# Patient Record
Sex: FEMALE | Race: WHITE | NOT HISPANIC OR LATINO | URBAN - METROPOLITAN AREA
[De-identification: names, ages, dates, MRNs, and addresses within clinical notes are randomized per-mention and may not be internally consistent; named-entity substitution may affect disease eponyms.]

---

## 2016-11-23 NOTE — PATIENT PROFILE ADULT. - PMH
Depression    Diverticulitis    DVT (deep venous thrombosis)  2013  Hypertension    Obesity    Reflux

## 2016-11-23 NOTE — PATIENT PROFILE ADULT. - PSH
Gastric bypass status for obesity  gastric sleeve, 6/2012  S/P appendectomy  1975  S/P breast biopsy  2011, left  S/P colon resection  2011  S/P knee surgery  repair 2009, 2011  S/P tonsillectomy  1967  Umbilical hernia  2000

## 2016-11-28 NOTE — PROGRESS NOTE ADULT - SUBJECTIVE AND OBJECTIVE BOX
POST-OPERATIVE NOTE    Procedure: revision Harmony-en-Y    Diagnosis/Indication: s/p LSG (2002) with persistent leak    Surgeon: Dr. Brady    S: Denies CP, SOB, GARCIA, calf tenderness. Pain controlled with medication. Endorses nausea, though denies vomiting.    O:  T(C): 36.6, Max: 36.6 (11-28 @ 13:40)  T(F): 97.9, Max: 97.9 (11-28 @ 13:40)  HR: 67 (67 - 70)  BP: 155/88 (141/64 - 156/84)  RR: 16 (16 - 16)  SpO2: 98% (98% - 100%)  Wt(kg): --                        9.3    14.2  )-----------( 211      ( 28 Nov 2016 14:36 )             30.2     28 Nov 2016 14:36    142    |  109    |  21     ----------------------------<  108    4.1     |  24     |  0.74     Ca    8.1        28 Nov 2016 14:36        Gen: NAD, resting comfortably in bed  C/V: NSR, HR in 60s  Pulm: Nonlabored breathing, no respiratory distress  Abd: soft, ND, obese. Appropriately tender in LLQ. Laparoscopic incisions CDI with dermabond. CHECO drain R abdomen with serosanguinous output.  Extrem: WWP, no calf edema or tenderness, SCDs in place  Yu in place, very light, clear urine

## 2016-11-28 NOTE — BRIEF OPERATIVE NOTE - OPERATION/FINDINGS
Lysis of Adhesions. RNY Gastric Bypass. Resection remanent stomach. EGD.    Chronic inflammation surrounding sleeve and including the site of previous leak.   Dense adhesions along left lobe of liver and lesser curvature of stomach.    Hand sewn gastrojejunal anastomosis.  Submersion leak test of anastomosis and direct visualization with endoscopy.

## 2016-11-28 NOTE — PROGRESS NOTE ADULT - ASSESSMENT
A/P: 56y Female s/p above procedure    Pain/nausea control  A/C: hold for now  NPO  Protonix 40mg IV QD  IVF LR@150cc/hr  CBC post-op stable  Dietician consult  AM labs  OOBA/IS/SCDs

## 2016-11-28 NOTE — H&P PST ADULT - HISTORY OF PRESENT ILLNESS
56  F with sleeve gastrectomy 2012 complicated by stricture and leak. 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 Pt with persistent vomiting. Pt presents for gastrectomy.

## 2016-11-29 NOTE — DIETITIAN INITIAL EVALUATION ADULT. - ENERGY NEEDS
IBW used as pt is currently greater than 120% ideal body weight.  Needs adjusted s/p bariatric surgery.  20-25cal/kg IBW for maintenance; post-op 545-654kcal (10-12cal/kg IBW), .5g pro (1.5-2.1g pro/kg IBW), >/= 1920cc fluid.

## 2016-11-29 NOTE — PROGRESS NOTE ADULT - SUBJECTIVE AND OBJECTIVE BOX
55 yo female, pmh lap vsg 2002 complicated by persistent leaks, htn, partial colectomy POD 1 s/p aamir en y reconstruction with no complaints today.  Pt denies chest pain, sob, abdominal pain, leg pain, fever, chills, n/v, lightheadedness, dizziness.                           9.7    15.1  )-----------( 236      ( 29 Nov 2016 07:34 )             31.7       29 Nov 2016 07:34    143    |  106    |  13     ----------------------------<  93     4.1     |  27     |  0.73     Ca    8.5        29 Nov 2016 07:34  Phos  3.0       29 Nov 2016 07:34  Mg     1.9       29 Nov 2016 07:34    TPro  5.7    /  Alb  2.6    /  TBili  0.6    /  DBili  0.15   /  AST  43     /  ALT  39     /  AlkPhos  66     29 Nov 2016 07:34      T(C): 37.9, Max: 37.9 (11-29 @ 09:15)  HR: 82 (67 - 87)  BP: 142/77 (105/62 - 168/83)  RR: 14 (14 - 16)  SpO2: 92% (92% - 100%)  Wt(kg): --      gen: A&0x3, NAD, pleasant,   Heart: s1,s2 RRR,   Lung: CTA B/L  Abd: n/d, n/t, soft, incisions c/d/i, mild erythema surrounding incision above umbilicus, about 1cm x 1 cm, no drainage  LE: no edema, b/l LE, no gerard b/l LE  skin: MMM    55 yo female pod 1 s/p aamir en y reconstruction, vss, no complaints, oob,     Plan:  ugi  incentive spirometry   oob, scds, asa later today   flagyl   ppi  nausea control prn  pain control prn  4 oz an hour every hour for 4 hours straight if ugi wnl

## 2016-11-29 NOTE — PROGRESS NOTE ADULT - PROBLEM SELECTOR PLAN 1
NPO/IVF  pain/nausea control  SCDs  bertha KESSLER x1  f/u AM labs  UGI in AM NPO/IVF  Flagyl  pain/nausea control  SCDs  bertha KESSLER x1  f/u AM labs  UGI in AM NPO/IVF  Flagyl  pain/nausea control  SCDs  bertha KESSLER x1  f/u AM labs  OOBA/IS

## 2016-11-29 NOTE — DIETITIAN INITIAL EVALUATION ADULT. - NS AS NUTRI INTERV MEALS SNACK
As medically feasible, recommend continued diet advancement to Phase 1 Bariatric Full Liquids diet + Ensure High Protein TID (480kcal, 48g pro)

## 2016-11-29 NOTE — PROGRESS NOTE ADULT - SUBJECTIVE AND OBJECTIVE BOX
Overnight Events: O/N: CHECO output slowed, still sang in nature, no tachycardia, and good UO, pain controlled, minimal nausea, BRAYDEN, AVSS  11/28: OR for Harmony-en-Y reconstruction, resection of gastric remnant, handsewn GJ anatomosis. Floseal placed on raw surface of liver, 12Fr Jose drain left under left lobe of liver. 2U PRBC given. Preop Hb 9, postop H/H 9.3/30.2. POC WNL; pt tender in LLQ and nauseous. 155mL serosanguinous (mostly sanguinous) output from CHECO at POC. Overnight Events: O/N: CHECO output slowed, still sang in nature, no tachycardia, and good UO, pain controlled, minimal nausea, BRAYDEN, AVSS  11/28: OR for Harmony-en-Y reconstruction, resection of gastric remnant, handsewn GJ anatomosis. Floseal placed on raw surface of liver, 12Fr Jose drain left under left lobe of liver. 2U PRBC given. Preop Hb 9, postop H/H 9.3/30.2. POC WNL; pt tender in LLQ and nauseous. 155mL serosanguinous (mostly sanguinous) output from CHECO at POC.    STATUS POST:  gastric bypass reconstruction POD1 after LSG (2002)     SUBJECTIVE: Pain well controlled with medication. No nausea or vomiting. Has been ambulating without difficulty. No SOB, CP.    metroNIDAZOLE  IVPB 500milliGRAM(s) IV Intermittent every 8 hours      Vital Signs Last 24 Hrs  T(C): 36.5, Max: 37.6 (11-29 @ 01:06)  T(F): 97.7, Max: 99.7 (11-29 @ 01:06)  HR: 87 (67 - 87)  BP: 129/70 (105/62 - 168/83)  BP(mean): 90 (83 - 90)  RR: 16 (15 - 16)  SpO2: 95% (95% - 100%)  I&O's Detail    I & Os for current day (as of 29 Nov 2016 07:11)  =============================================  IN:    lactated ringers.: 900 ml    Total IN: 900 ml  ---------------------------------------------  OUT:    Voided: 650 ml    Indwelling Catheter - Urethral: 535 ml    Bulb: 250 ml    Total OUT: 1435 ml  ---------------------------------------------  Total NET: -535 ml      General: NAD, resting comfortably in bed  C/V: NSR  Pulm: Nonlabored breathing, no respiratory distress  Abd: soft, NT/ND, obese. CHECO in RLQ with sanguinous output, slowing down since OR. Some leakage around CHECO. Other incisions CDI.  Extrem: WWP, no edema, SCDs in place        LABS:                        9.3    14.2  )-----------( 211      ( 28 Nov 2016 14:36 )             30.2     28 Nov 2016 14:36    142    |  109    |  21     ----------------------------<  108    4.1     |  24     |  0.74     Ca    8.1        28 Nov 2016 14:36            RADIOLOGY & ADDITIONAL STUDIES:

## 2016-11-29 NOTE — PROGRESS NOTE ADULT - ASSESSMENT
55 yo F with morbid obesity 56yoF with PMH HTN, diverticulitis s/p partial colectomy, LSG (2002), now s/p gastric bypass reconstruction POD1

## 2016-11-29 NOTE — DIETITIAN INITIAL EVALUATION ADULT. - OTHER INFO
Pt previously with sleeve gastrectomy complicated by stricture and leak with chronic fistula; pt presents for RYGB.  Pt with previous weight loss ~117lbs s/p LSG with weight stabilization over the past couple years.  Of note, pt with low iron stores and Vit D-OH level; recommend repeat levels and possible repletion.  Pt endorses good tolerance of clears thus far.  Pt denies GI distress; pain is being managed.  NKFA.  Skin: surgical incision; otherwise intact .

## 2016-11-30 NOTE — DISCHARGE NOTE ADULT - CONDITIONS AT DISCHARGE
stable, J-Tube intact and patent, midline incision wound VAC removed and changed to wet to dry dressing as ordered for transportation to [facility]

## 2016-11-30 NOTE — PROGRESS NOTE ADULT - SUBJECTIVE AND OBJECTIVE BOX
Overnight Events: O/N: drowsy, held narcotics, started IV tylenol, passed TOV, O2 improved in chair to 96%  11/29:UGI Contained focal out pouching of the oral contrast and the gastrojejunal anastomosis suggesting possibility of a contained anastomotic leak.Pain well controlled , OOB/AMB , tolerating sips OVERNIGHT EVENTS: drowsy, held narcotics, started IV tylenol, passed TOV, O2 improved in chair to 96%  11/29:UGI Contained focal out pouching of the oral contrast and the gastrojejunal anastomosis suggesting possibility of a contained anastomotic leak.Pain well controlled , OOB/AMB , tolerating sips       STATUS POST:  Harmony-en- Y reconstruction     POST OPERATIVE DAY #:  2    SUBJECTIVE: Patient complains of incisional pain  Flatus: [] YES [X] NO             Bowel Movement: [ ] YES [ X] NO  Pain (0-10):            Pain Control Adequate: [X YES [ ] NO  Nausea: [ ] YES [X ] NO            Vomiting: [ ] YES [X ] NO  Diarrhea: [ ] YES [X ] NO         Constipation: [ ] YES [X ] NO     Chest Pain: [ ] YES [X ] NO    SOB:  [ ] YES [X ] NO    metroNIDAZOLE  IVPB 500milliGRAM(s) IV Intermittent every 8 hours  heparin  Injectable 5000Unit(s) SubCutaneous every 8 hours  cefOXitin  IVPB 1Gram(s) IV Intermittent every 8 hours      Vital Signs Last 24 Hrs  T(C): 37.1, Max: 38.3 (11-29 @ 17:23)  T(F): 98.8, Max: 101 (11-29 @ 17:23)  HR: 80 (80 - 87)  BP: 109/64 (109/64 - 142/77)  BP(mean): --  RR: 15 (14 - 16)  SpO2: 94% (92% - 97%)  I&O's Detail    I & Os for current day (as of 30 Nov 2016 07:17)  =============================================  IN:    lactated ringers.: 1650 ml    Total IN: 1650 ml  ---------------------------------------------  OUT:    Voided: 550 ml    Indwelling Catheter - Urethral: 300 ml    Bulb: 125 ml    Total OUT: 975 ml  ---------------------------------------------  Total NET: 675 ml      General: NAD, resting comfortably in bed  C/V: NSR  Pulm: Nonlabored breathing, no respiratory distress  Abd: soft, non distended , TTP around incision site , incision clean dry and intact   Extrem: WWP, no edema, SCDs in place  Drains: serosanguineous          LABS:                        9.7    15.1  )-----------( 236      ( 29 Nov 2016 07:34 )             31.7     29 Nov 2016 07:34    143    |  106    |  13     ----------------------------<  93     4.1     |  27     |  0.73     Ca    8.5        29 Nov 2016 07:34  Phos  3.0       29 Nov 2016 07:34  Mg     1.9       29 Nov 2016 07:34    TPro  5.7    /  Alb  2.6    /  TBili  0.6    /  DBili  0.15   /  AST  43     /  ALT  39     /  AlkPhos  66     29 Nov 2016 07:34          RADIOLOGY & ADDITIONAL STUDIES:

## 2016-11-30 NOTE — DISCHARGE NOTE ADULT - MEDICATION SUMMARY - MEDICATIONS TO CHANGE
I will SWITCH the dose or number of times a day I take the medications listed below when I get home from the hospital:  None

## 2016-11-30 NOTE — DISCHARGE NOTE ADULT - PATIENT PORTAL LINK FT
“You can access the [employer] Patient Portal, offered by [facility], by registering with the following website: [email or web address]”

## 2016-11-30 NOTE — PROGRESS NOTE ADULT - SUBJECTIVE AND OBJECTIVE BOX
s/p major revision and looks very good  has incisional pain no peritoneal signs   drain is clear and no tachycardia  hct stable   urine output is fine  wbc 14 k  I reviewed ugi and the patient had an asynchronous pouch from the chronic issues  I do not see any leak and this would not be contained at this early as no omental seal  tolerating diet   will leave drain in place and follow for at least 24 hours

## 2016-11-30 NOTE — DISCHARGE NOTE ADULT - NS AS ACTIVITY OBS
Stairs allowed/Walking-Outdoors allowed/No Heavy lifting/straining/Driving allowed/Showering allowed/Walking-Indoors allowed

## 2016-11-30 NOTE — DISCHARGE NOTE ADULT - NS MD DC FALL RISK RISK
For information on Fall & Injury Prevention, visit [email or web address]

## 2016-11-30 NOTE — DISCHARGE NOTE ADULT - HOSPITAL COURSE
56yoF with PMH HTN, diverticulitis s/p partial colectomy, LSG (2002) complicated by chronic fistula and corkscrewed sleeve presented for elective bariatric surgery. Pt underwent a RYGB reconstruction. Procedure was complicated by difficult hemostasis; CHECO drain was thus placed. Post-op course was uneventful. UGI on POD#1 initially read as possible contained anastomotic leak, though in discussion with attending, UGI negative for leak or obstruction and diet was advanced to bariatric clear liquids. At time of discharge the patient was tolerating an adequate amount of PO intake and was stable and ready for discharge with follow-up as outpatient. 56yoF with PMH HTN, diverticulitis s/p partial colectomy, LSG (2002) complicated by chronic fistula and corkscrewed sleeve presented for elective bariatric surgery. Pt underwent a RYGB reconstruction. Procedure was complicated by difficult hemostasis; CHECO drain was thus placed. 56yoF with PMH HTN, diverticulitis s/p partial colectomy, LSG (2002) complicated by chronic fistula and corkscrewed sleeve presented for elective bariatric surgery. Pt underwent a RYGB reconstruction. Procedure was complicated by hypotension and hypoxia postoperatively. Patient was transferred to the SICU, intubated and required pressors for blood pressure maintenance. While under SICU care, patient's condition continued to deteriorate. On 12/3/16, patient was taken back to the OR for a exploratory laparotomy, abdominal washout, J tube placement, placement of abdominal drains, and delayed closure with wound vac. Patient had an anastomotic leak at the EJ junction for which an esophagojejunostomy stent was placed endoscopically by GI. Patient's course in the ICU was complicated by MDR pseudomonas infection in the wound as well as an MDR pseudomonal pneumonia. While on multiple abx therapy, patient developed C.diff infection which was treated with PO Vancomycin. Patient underwent a tracheostomy placement and was weaned from the went during her prolonged course. Patient's respiratory status improved with Meropenem and Colistin and C.diff infx improved with PO vancomycin. Patient was treated with a 1 month course of IV meropenem/Colistin and concurrent Vancomycin treatment. Patient's condition improved with treatment. On 1/18/17, surveillance EGD was wnl and the EJ stent was removed. On 1/19/17, patient was decannulated and stepped down to a regional bed. Patient's diet was advanced per speech and swallow recommendations. On 1/22 patient was advanced to a mechanical soft diet. Patient's wound was healing well with granulation tissue and no evidence of infection. On 1/30/17, patient completed the recommended course of IV abx per Infectous disease. The patient was stable for discharge to [facility] at this time. At the time of discharge, patient was stable.

## 2016-11-30 NOTE — DISCHARGE NOTE ADULT - PLAN OF CARE
Recovery, weight loss Follow up with Dr. Brady in 1 week. Call the office at  to schedule your appointment. You may shower; soap and water over incision sites. Do not scrub. Pat dry when done. No tub bathing or swimming until cleared. Keep incision sites out of the sun as scars will darken. No heavy lifting (>10lbs) or strenuous exercise. Diet: Bariatric Full Fluids. 60 grams protein daily.  64 fluid ounces water daily. Drink small sips throughout the day. Continue diet as outlined by paperwork received as a pre-operative patient. You should be urinating at least 3-4x per day. Call the office if you experience increasing abdominal pain, nausea, vomiting, or temperature >100.4F. No NSAIDs until approved by Dr. Brady. Avoid alcoholic beverages until cleared by Dr. Brady. Please also follow up with your primary care provider to discuss management of your other health issues after surgery, such as management of glucose and blood pressure, as the dosages of any current medications may change as you lose weight. Wound Care Wound care instructions:   Wound VAC to be changed MWF. Apply white granufoam dressing first. Then apply small black granufoam dressing on top of white foam.   Replace wound vac dressing MWF.    IF WOUND VAC stops working, replace with wet to dry dressing.   Pack wound with 4x4 gauze soaked in sterile saline. Cover with 4x4 dry gauze. Cover with abdominal pad. Tape in place with paper tape. Replace daily.

## 2016-11-30 NOTE — DISCHARGE NOTE ADULT - MEDICATION SUMMARY - MEDICATIONS TO TAKE
I will START or STAY ON the medications listed below when I get home from the hospital:    acetaminophen 325 mg oral tablet  -- 1 tab(s) by mouth every 4 hours, As needed, Moderate Pain (4 - 6)  -- Indication: For Pain    acetaminophen-oxyCODONE 325 mg-5 mg oral tablet  -- 1 tab(s) by mouth every 4 hours, As needed, Severe Pain (7 - 10)  -- Indication: For Pain    losartan 100 mg oral tablet  -- 1 tab(s) by mouth once a day  -- Indication: For HTN    enoxaparin  -- 80 unit(s) subcutaneous once a day  -- Indication: For DVT (deep venous thrombosis)    Valium 10 mg oral tablet  -- 1 tab(s) by mouth once a day (at bedtime)  -- Indication: For Anxiety    Lexapro 20 mg oral tablet  -- 1 tab(s) by mouth once a day  -- Indication: For Anxiety    Zofran ODT 4 mg oral tablet, disintegrating  -- 1 tab(s) by mouth 3 times a day, As Needed -for nausea  -- Indication: For Nausea    diphenhydrAMINE 25 mg oral capsule  -- 1 cap(s) by mouth once a day (at bedtime), As needed, Insomnia  -- Indication: For Itching    albuterol-ipratropium 2.5 mg-0.5 mg/3 mL inhalation solution  -- 3 milliliter(s) inhaled every 6 hours  -- Indication: For Recent PNA    meropenem 1000 mg intravenous injection  -- 2000 milligram(s) intravenous every 8 hours  -- Indication: For MDRO infection     nystatin 100,000 units/g topical powder  -- 1 application on skin once a day  -- Indication: For Itching    hydrocortisone 1% topical cream  -- 1 application on skin 2 times a day  -- Indication: For Itching     collagenase 250 units/g topical ointment  -- 1 application on skin once a day  -- Indication: For Wound care    vancomycin 250 mg/5 mL oral solution  -- 2.5 milliliter(s) by mouth every 6 hours  -- Indication: For Cdiff infection     ferrous sulfate 300 mg/5 mL (60 mg elemental iron) oral liquid  -- 5 milliliter(s) by mouth once a day  -- Indication: For Anemia    zinc sulfate 220 mg oral capsule  -- 1 cap(s) by mouth once a day  -- Indication: For Supplement    Coly Mycin M 150 mg (colistin base) injection  -- 150 milligram(s) injectable once a day  -- Indication: For MDRO infection     Protonix 40 mg oral granule, enteric coated  -- 1 each by mouth once a day  -- Indication: For Acid Reflux    VESIcare 5 mg oral tablet  -- 1 tab(s) by mouth once a day  -- Indication: For Antispasmodic    Multiple Vitamins oral tablet  -- 1 tab(s) by mouth once a day  -- Indication: For Supplement    Vitamin B-12 250 mcg oral tablet  -- 1 tab(s) by mouth once a day  -- Indication: For Supplement    cholecalciferol oral tablet  -- 2000 unit(s) by mouth once a day  -- Indication: For Supplement    thiamine 100 mg oral tablet  -- 1 tab(s) by mouth once a day  -- Indication: For Supplement

## 2016-12-01 NOTE — PROGRESS NOTE ADULT - SUBJECTIVE AND OBJECTIVE BOX
OVERNIGHT EVENTS: No acute events overnight   11/30: Minimal ambulation with assistance. Pt with persistent pain.    STATUS POST: Harmony -en-Y reconstruction     POST OPERATIVE DAY #: 3    SUBJECTIVE: Patient reports pain is uncontrolled  and reports tolerating BCLD.   Flatus: [] YES [X] NO             Bowel Movement: [ ] YES [X ] NO  Pain (0-10):  6/10          Pain Control Adequate: [ ] YES [X ] NO  Nausea: [ ] YES [X ] NO            Vomiting: [ ] YES [X ] NO  Diarrhea: [ ] YES [X ] NO         Constipation: [ ] YES [X ] NO     Chest Pain: [ ] YES [X ] NO    SOB:  [ ] YES [X ] NO    metroNIDAZOLE  IVPB 500milliGRAM(s) IV Intermittent every 8 hours  heparin  Injectable 5000Unit(s) SubCutaneous every 8 hours      Vital Signs Last 24 Hrs  T(C): 36.4, Max: 37.2 (11-30 @ 18:24)  T(F): 97.6, Max: 99 (11-30 @ 18:24)  HR: 88 (88 - 94)  BP: 113/75 (101/59 - 114/65)  BP(mean): 90 (90 - 90)  RR: 16 (16 - 16)  SpO2: 95% (90% - 96%)  I&O's Detail    I & Os for current day (as of 01 Dec 2016 09:03)  =============================================  IN:    lactated ringers.: 3450 ml    Oral Fluid: 400 ml    IV PiggyBack: 150 ml    Total IN: 4000 ml  ---------------------------------------------  OUT:    Voided: 1250 ml    Bulb: 75 ml    Total OUT: 1325 ml  ---------------------------------------------  Total NET: 2675 ml      General: NAD, resting comfortably in bed  C/V: NSR  Pulm: Nonlabored breathing, no respiratory distress  Abd: soft, non distended , incision clean dry and intact , TTP around incision site.  Extrem: WWP, no edema, SCDs in place  Drains: CHECO serosanguineous       LABS:                        10.4   15.9  )-----------( 312      ( 01 Dec 2016 02:38 )             33.3     30 Nov 2016 07:10    139    |  105    |  17     ----------------------------<  111    3.7     |  28     |  0.78     Ca    8.2        30 Nov 2016 07:10  Phos  2.5       30 Nov 2016 07:10  Mg     2.1       30 Nov 2016 07:10    TPro  5.3    /  Alb  2.2    /  TBili  0.7    /  DBili  x      /  AST  22     /  ALT  30     /  AlkPhos  63     30 Nov 2016 07:10          RADIOLOGY & ADDITIONAL STUDIES:

## 2016-12-01 NOTE — PHYSICAL THERAPY INITIAL EVALUATION ADULT - CRITERIA FOR SKILLED THERAPEUTIC INTERVENTIONS
risk reduction/prevention/rehab potential/anticipated discharge recommendation/impairments found/therapy frequency/functional limitations in following categories

## 2016-12-01 NOTE — PROGRESS NOTE ADULT - PROBLEM SELECTOR PLAN 1
BCLD/IVF  pain/nausea control  DVT ppx  f/u AM labs  f/u Physical Therapy rec  Toradol x1 dose  Valium x 1

## 2016-12-01 NOTE — PHYSICAL THERAPY INITIAL EVALUATION ADULT - PERTINENT HX OF CURRENT PROBLEM, REHAB EVAL
Pt is a 55 y/o F PMHx of gastric bypass surgery presenting with persistent vomiting, gastrectomy performed to repair leak in gastric sleeve.

## 2016-12-01 NOTE — PROGRESS NOTE ADULT - SUBJECTIVE AND OBJECTIVE BOX
57 yo female pod 3 s/p aamir en y reconstruction with abdominal pain.  Pt states its constant, not worsening.  She states she "noticed it more when they switched meds to percocet."  It is in her lower abdomen and periumbilical.  Hurts worse with movement.  Dull pain.  Denies fever, chills, n/v, chest pain, sob, lightheadedness, dizziness, leg pain.                          10.4   15.9  )-----------( 312      ( 01 Dec 2016 02:38 )             33.3     30 Nov 2016 07:10    139    |  105    |  17     ----------------------------<  111    3.7     |  28     |  0.78     Ca    8.2        30 Nov 2016 07:10  Phos  2.5       30 Nov 2016 07:10  Mg     2.1       30 Nov 2016 07:10    TPro  5.3    /  Alb  2.2    /  TBili  0.7    /  DBili  x      /  AST  22     /  ALT  30     /  AlkPhos  63     30 Nov 2016 07:10    T(C): 37.1, Max: 37.2 (11-30 @ 18:24)  HR: 84 (84 - 94)  BP: 111/65 (101/59 - 114/65)  RR: 16 (16 - 16)  SpO2: 94% (90% - 96%)  Wt(kg): --    gen: A&0x3, NAD  Heart: s1,s2 RRR,   Lung: CTA B/L  Abd: n/d, soft, incisions c/d/i, mild tenderness to palpation periumbilical   LE: no edema, b/l LE, no gerard b/l LE  skin: MMM    57 yo female pod 3 with abdominal discomfort relieved with dilaudid, vss, anemia,         Plan:  monitor vitals and cbc qd, serial abdominal exams  oob, scds,  ppi  nausea control prn  pain control prn- can use dilaudid per dr tucker   4 oz an hour every hour for 4 hours straight  incentive spirometry

## 2016-12-01 NOTE — PHYSICAL THERAPY INITIAL EVALUATION ADULT - ADDITIONAL COMMENTS
Pt lives with spouse in a house with a few steps to enter and 13 steps to the 2nd floor (with handrail). Pt has raised toilet seats due to knee issues.

## 2016-12-01 NOTE — PHYSICAL THERAPY INITIAL EVALUATION ADULT - GENERAL OBSERVATIONS, REHAB EVAL
Pt received semi supine in bed, appearing lethargic but arousable, + EKG, IV, abdominal drain with incision C/D/I, SCDs.

## 2016-12-02 NOTE — PROGRESS NOTE ADULT - SUBJECTIVE AND OBJECTIVE BOX
s/p repair of chronic leak  afeb heart rate in 80's  abd soft tolerting liquids nothing out drain  wbc is 7.9  requires being straight cath and has had increase in bun cr  will stop iv antibioics  esyes closed but oriented no focal signs  hold narcotics  ambulate  hopefully void on own   urine output fine  hct is 30  needs to ambulate

## 2016-12-02 NOTE — PROVIDER CONTACT NOTE (CHANGE IN STATUS NOTIFICATION) - SITUATION
pt became rapidly tachycardic to 120's, hypoxic to 70's, hypotensive to 70's systolic. pt became flushed from chest up. pt maintained alertness. medicated per dr varner's orders

## 2016-12-02 NOTE — PROGRESS NOTE ADULT - SUBJECTIVE AND OBJECTIVE BOX
INTERVAL HPI/OVERNIGHT EVENTS:    STATUS POST:  aamir-en-y    POST OPERATIVE DAY #: 4    SUBJECTIVE:  Flatus: [x] YES [ ] NO             Bowel Movement: [ ] YES [x] NO  Pain (0-10):            Pain Control Adequate: [x] YES [ ] NO  Nausea: [ ] YES [x] NO            Vomiting: [ ] YES [x] NO  Diarrhea: [ ] YES [x] NO         Constipation: [ ] YES [x] NO     Chest Pain: [ ] YES [x] NO    SOB:  [ ] YES [x] NO    MEDICATIONS  (STANDING):  lactated ringers. 1000milliLiter(s) IV Continuous <Continuous>  pantoprazole  Injectable 40milliGRAM(s) IV Push daily  heparin  Injectable 5000Unit(s) SubCutaneous every 8 hours  docusate sodium 100milliGRAM(s) Oral two times a day  calcium gluconate IVPB 1Gram(s) IV Intermittent once  levoFLOXacin  Tablet 500milliGRAM(s) Oral every 24 hours    MEDICATIONS  (PRN):  ondansetron Injectable 8milliGRAM(s) IV Push every 6 hours PRN Nausea and/or Vomiting  metoclopramide Injectable 10milliGRAM(s) IV Push every 6 hours PRN nausea      Vital Signs Last 24 Hrs  T(C): 36.1, Max: 37.2 ( @ 17:00)  T(F): 97, Max: 98.9 ( @ 17:00)  HR: 80 (74 - 87)  BP: 144/62 (88/54 - 144/62)  BP(mean): --  RR: 12 (12 - 16)  SpO2: 100% (92% - 100%)    PHYSICAL EXAM:      Constitutional: A&Ox3    Respiratory: non labored breathing, no respiratory distress    Cardiovascular: NSR, RRR    Gastrointestinal: S/NT/ND, CHECO s/s                 Incision: C/D/I    Extremities: (-) edema    Neuro: A&Ox3, no focal motor deficits, CN II-XI intact                  I&O's Detail  I & Os for 24h ending 02 Dec 2016 07:00  =============================================  IN:    lactated ringers.: 3000 ml    IV PiggyBack: 500 ml    Oral Fluid: 150 ml    Total IN: 3650 ml  ---------------------------------------------  OUT:    Intermittent Catheterization - Urethral: 550 ml    Voided: 550 ml    Bulb: 50 ml    Total OUT: 1150 ml  ---------------------------------------------  Total NET: 2500 ml    I & Os for current day (as of 02 Dec 2016 09:53)  =============================================  IN:    Total IN: 0 ml  ---------------------------------------------  OUT:    Intermittent Catheterization - Urethral: 325 ml    Total OUT: 325 ml  ---------------------------------------------  Total NET: -325 ml      LABS:                        9.2    7.9   )-----------( 228      ( 02 Dec 2016 07:13 )             29.6     02 Dec 2016 07:13    139    |  106    |  33     ----------------------------<  75     4.1     |  24     |  1.22     Ca    8.1        02 Dec 2016 07:13  Phos  3.5       02 Dec 2016 07:13  Mg     2.2       02 Dec 2016 07:13    TPro  5.0    /  Alb  1.8    /  TBili  0.6    /  DBili  x      /  AST  11     /  ALT  22     /  AlkPhos  62     01 Dec 2016 11:36      Urinalysis Basic - ( 02 Dec 2016 07:44 )    Color: Yellow / Appearance: Clear / S.015 / pH: x  Gluc: x / Ketone: NEGATIVE  / Bili: NEGATIVE / Urobili: 0.2 E.U./dL   Blood: x / Protein: NEGATIVE mg/dL / Nitrite: POSITIVE   Leuk Esterase: NEGATIVE / RBC: < 5 /HPF / WBC < 5 /HPF   Sq Epi: x / Non Sq Epi: Moderate /HPF / Bacteria: Many /HPF INTERVAL HPI/OVERNIGHT EVENTS:O/N: straight cath 550cc, 500cc bolus given  : per PT no needs , pain uncontrolled given toradol , dilaudid 1 mg , valium ,    STATUS POST:  aamir-en-y    POST OPERATIVE DAY #: 4    SUBJECTIVE:  Flatus: [x] YES [ ] NO             Bowel Movement: [ ] YES [x] NO  Pain (0-10):            Pain Control Adequate: [x] YES [ ] NO  Nausea: [ ] YES [x] NO            Vomiting: [ ] YES [x] NO  Diarrhea: [ ] YES [x] NO         Constipation: [ ] YES [x] NO     Chest Pain: [ ] YES [x] NO    SOB:  [ ] YES [x] NO    MEDICATIONS  (STANDING):  lactated ringers. 1000milliLiter(s) IV Continuous <Continuous>  pantoprazole  Injectable 40milliGRAM(s) IV Push daily  heparin  Injectable 5000Unit(s) SubCutaneous every 8 hours  docusate sodium 100milliGRAM(s) Oral two times a day  calcium gluconate IVPB 1Gram(s) IV Intermittent once  levoFLOXacin  Tablet 500milliGRAM(s) Oral every 24 hours    MEDICATIONS  (PRN):  ondansetron Injectable 8milliGRAM(s) IV Push every 6 hours PRN Nausea and/or Vomiting  metoclopramide Injectable 10milliGRAM(s) IV Push every 6 hours PRN nausea      Vital Signs Last 24 Hrs  T(C): 36.1, Max: 37.2 ( @ 17:00)  T(F): 97, Max: 98.9 ( @ 17:00)  HR: 80 (74 - 87)  BP: 144/62 (88/54 - 144/62)  BP(mean): --  RR: 12 (12 - 16)  SpO2: 100% (92% - 100%)    PHYSICAL EXAM:      Constitutional: A&Ox3    Respiratory: non labored breathing, no respiratory distress    Cardiovascular: NSR, RRR    Gastrointestinal: S/NT/ND, CHECO s/s                 Incision: C/D/I    Extremities: (-) edema    Neuro: A&Ox3, no focal motor deficits, CN II-XI intact                  I&O's Detail  I & Os for 24h ending 02 Dec 2016 07:00  =============================================  IN:    lactated ringers.: 3000 ml    IV PiggyBack: 500 ml    Oral Fluid: 150 ml    Total IN: 3650 ml  ---------------------------------------------  OUT:    Intermittent Catheterization - Urethral: 550 ml    Voided: 550 ml    Bulb: 50 ml    Total OUT: 1150 ml  ---------------------------------------------  Total NET: 2500 ml    I & Os for current day (as of 02 Dec 2016 09:53)  =============================================  IN:    Total IN: 0 ml  ---------------------------------------------  OUT:    Intermittent Catheterization - Urethral: 325 ml    Total OUT: 325 ml  ---------------------------------------------  Total NET: -325 ml      LABS:                        9.2    7.9   )-----------( 228      ( 02 Dec 2016 07:13 )             29.6     02 Dec 2016 07:13    139    |  106    |  33     ----------------------------<  75     4.1     |  24     |  1.22     Ca    8.1        02 Dec 2016 07:13  Phos  3.5       02 Dec 2016 07:13  Mg     2.2       02 Dec 2016 07:13    TPro  5.0    /  Alb  1.8    /  TBili  0.6    /  DBili  x      /  AST  11     /  ALT  22     /  AlkPhos  62     01 Dec 2016 11:36      Urinalysis Basic - ( 02 Dec 2016 07:44 )    Color: Yellow / Appearance: Clear / S.015 / pH: x  Gluc: x / Ketone: NEGATIVE  / Bili: NEGATIVE / Urobili: 0.2 E.U./dL   Blood: x / Protein: NEGATIVE mg/dL / Nitrite: POSITIVE   Leuk Esterase: NEGATIVE / RBC: < 5 /HPF / WBC < 5 /HPF   Sq Epi: x / Non Sq Epi: Moderate /HPF / Bacteria: Many /HPF

## 2016-12-02 NOTE — PROGRESS NOTE ADULT - ASSESSMENT
57 yo female pod 3 with abdominal discomfort relieved with dilaudid, vss, anemia,     monitor vitals and cbc qd, serial abdominal exams  oob, scds,  ppi  nausea control prn  pain control prn- can use dilaudid per dr tucker   4 oz an hour every hour for 4 hours straight  incentive spirometry 55 yo female s/p aamir-en-y now pod 4, with lethargy this AM    monitor vitals and cbc qd, serial abdominal exams  oob, scds,  ppi  nausea control prn  pain control prn- tylenol PRN  4 oz an hour every hour for 4 hours straight  incentive spirometry   Will switch flagyl to levaquin 500mg PO qd.

## 2016-12-03 NOTE — PROGRESS NOTE ADULT - ATTENDING COMMENTS
Seen several times over the morning.  Sedate pressor requirement slowly decresing, urine output remains poor.  On exam well perfused.  CVP greater than 10.  Unable to get view of IVC given recent surgery.  Lack of decrease in H?H depsite large amount of fluid suggestive of third spacing, albumin level1.3    A- septic shock /respiratory failure/intra-abdominal sepsis/ATN    Suggest:  5% albumin for fluid resuscitation  25% three time day  if BUN/cret increase adjust ABX doses  consider steroids if pressor do not decrease  maintain on AC  check urine na, creat, osm and UA

## 2016-12-03 NOTE — PROCEDURE NOTE - NSTIMEOUT_GEN_A_CORE
Patient's first and last name, , procedure, and correct site confirmed prior to the start of procedure.

## 2016-12-03 NOTE — PROGRESS NOTE ADULT - SUBJECTIVE AND OBJECTIVE BOX
Interval Events: PT taken to OR o/n emergently and found to have a leak at esophagojejunostomy anastamosis which could not be repaired due to its location. Pt underwent an ex-lap with abdominal washout and placement of feeding j-tube distal to anastamosis.     Patient seen and examined at bedside. Vented and sedated.       Allergies    sulfa drugs (Unknown)  sulfamethoxazole (Other)    Intolerances        Vital Signs Last 24 Hrs  T(C): 36.8, Max: 37.7 ( @ 17:38)  T(F): 98.2, Max: 99.9 ( @ 17:38)  HR: 78 (74 - 122)  BP: 85/53 (84/47 - 154/88)  BP(mean): 66 (56 - 136)  RR: 23 (12 - 31)  SpO2: 98% (23% - 99%)  I & Os for 24h ending  @ 07:00  =============================================  IN: 95084 ml / OUT: 1040 ml / NET: 44547 ml    I & Os for current day (as of  @ 12:50)  =============================================  IN: 1808 ml / OUT: 175 ml / NET: 1633 ml  I & Os for 24h ending  @ 07:00  =============================================  IN: 35144 ml / OUT: 1040 ml / NET: 46180 ml    I & Os for current day (as of  @ 12:50)  =============================================  IN: 1808 ml / OUT: 175 ml / NET: 1633 ml        LABS:  ABG - ( 03 Dec 2016 09:25 )  pH: 7.36  /  pCO2: 25    /  pO2: 90    / HCO3: 14    / Base Excess: -10.4 /  SaO2: 97                  CBC Full  -  ( 03 Dec 2016 09:36 )  WBC Count : 13.4 K/uL  Hemoglobin : 9.5 g/dL  Hematocrit : 30.5 %  Platelet Count - Automated : 362 K/uL  Mean Cell Volume : 72.4 fL  Mean Cell Hemoglobin : 22.6 pg  Mean Cell Hemoglobin Concentration : 31.1 g/dL      03 Dec 2016 09:36    139    |  110    |  28     ----------------------------<  124    3.7     |  14     |  1.27     Ca    6.5        03 Dec 2016 09:36  Phos  2.1       03 Dec 2016 09:36  Mg     1.4       03 Dec 2016 09:36    TPro  4.1    /  Alb  1.3    /  TBili  0.8    /  DBili  x      /  AST  37     /  ALT  12     /  AlkPhos  79     03 Dec 2016 09:36    PTT - ( 03 Dec 2016 01:26 )  PTT:41.0 sec      Urinalysis Basic - ( 03 Dec 2016 09:35 )    Color: Yellow / Appearance: Clear / S.010 / pH: x  Gluc: x / Ketone: NEGATIVE  / Bili: NEGATIVE / Urobili: 0.2 E.U./dL   Blood: x / Protein: Trace mg/dL / Nitrite: POSITIVE   Leuk Esterase: NEGATIVE / RBC: < 5 /HPF / WBC < 5 /HPF   Sq Epi: x / Non Sq Epi: Few /HPF / Bacteria: Many /HPF        RADIOLOGY & ADDITIONAL STUDIES (The following images were personally reviewed):      Physical Exam:     Neuro: Sedated. Opens eyes spontaneously. Withdrawals to pain.  CV: RRR.    Pulm: CTA B/L   Abd: softly distended. Wound vac to midline. CHECO x 2 with mostly serous output.   Ext: 1+ pitting edema x 4. WWP x 4. Feet cool.

## 2016-12-03 NOTE — CONSULT NOTE ADULT - SUBJECTIVE AND OBJECTIVE BOX
56F ho HTN diverticulits/partial colectomy, gastric bypass 2002, persistent leaks, feeding J tube, now 11-28 aamir en Y reconstruction. today noted to have hypoxia, at approx 1945 acute hypotension. patient awake anxious moving all extr to command, lungs fairly clear, abd quite  tender diffusely to palpation. hypoxia which improved with bipap. intubated by anesthesia. IVF wide open given. transported to SICU. because of persistetn hypotension [id number] hi dose levophed (approx 40mcg/min) central line and lydia established in sicu.  CXR: ETT in R main stem just beyond jone, repositioned. Dr. Clark at [facility]. lactate 3 . transported for CT: no PE, bibasilar atelectasis and small RLL infiltrate, apparent anastomic leak and fluid collection.   1. pulm: acute post op resp failure. abg showed marked hypoxia pO2 60 on 100% fio2 and peep 10. shunting due to atelectasis with high hemidiaphragms due to abd distension. increase peep as tolerated. cont mech vent 23/500/10/100%  2. post surgical leak. plan for OR  3. sepsis/peritonitis: cont IVF resuscitation, vanco zosyn stat  4. shock: repeat lactate. monitor CVP, IVF accordingly. cont levophed.  TTE bedside showed good LV and RV motion  5. sedate for comfort as she was moving while intubated, currently fentanyl propofol  plan discussed with surgery LULI clark/ Dr. Brady 56F ho HTN diverticulits/partial colectomy, gastric bypass 2002, persistent leaks, feeding J tube, now 11-28 aamir en Y reconstruction. today noted to have hypoxia, at approx 1945 acute hypotension. patient awake anxious moving all extr to command, lungs fairly clear, abd quite  tender diffusely to palpation. hypoxia which improved with bipap. intubated by anesthesia. IVF wide open given. transported to SICU. because of persistetn hypotension [id number] hi dose levophed (approx 40mcg/min) central line and radha established in sicu.  CXR: ETT in R main stem just beyond jone, repositioned. Dr. Clark at [facility]. lactate 3 . transported for CT: no PE, bibasilar atelectasis and small RLL infiltrate, apparent anastomic leak and fluid collection.   1. pulm: acute post op resp failure. abg showed marked hypoxia pO2 60 on 100% fio2 and peep 10. shunting due to atelectasis with high hemidiaphragms due to abd distension. increase peep as tolerated. cont mech vent 23/500/10/100%  2. post surgical leak. plan for OR  3. sepsis/peritonitis: cont IVF resuscitation, vanco zosyn stat  4. shock: repeat lactate. monitor CVP, IVF accordingly. cont levophed.  TTE bedside showed good LV and RV motion  5. sedate for comfort as she was moving while intubated, currently fentanyl propofol  plan discussed with surgery LULI clark/ Dr. Brady        ****UPDATE 12/3/16 0500******    Patient taken emergently to the OR for ex lap, abdominal washout, placement of a feeding j tube in common channel distal to Russel anastomosis, and placement of abdominal drains.  Abdominal wound left open with wound vac in place.  Intra-op, patient found to have leak of esophagojejunostomy which they could not repair due to its location.  Patient returned to the SICU after operation, still on propofol/fentanyl/levophed.        Neuro: propofol gtt, fentanyl gtt  CVS: levophed, normotensive goals  Pulm: Intubated  FEN/GI: LR@100, NPO  : stone  Endo: ISS  ID: Zosyn, fluconazole  Heme: SCDs, SQH  Lines/drains: LIJ (12/3-), Radha (12/3-), 2 abd JPs  Wounds: midline abd wound w/wound vac

## 2016-12-03 NOTE — PROGRESS NOTE ADULT - ASSESSMENT
56 year old female s/p SIPS procedure c/b anastamotic leak at esophago-jej anastamosis now s/p take back to OR for exploratory laparotomy, abdominal washout, placement of a feeding j-tube in common channel distal to RY anastomosis, and placement of abdominal drains. Pt now anuric with high pressor requirement.    Neuro: propofol gtt, fentanyl gtt  CVS: levophed, normotensive goals. Will discontinue crystalloid and give albumin 5% x 2 STAT.   Pulm: Intubated  FEN/GI: NPO with NGT. Jtube placed distal to anastamosis. Protonix. GI on board for possible endoscopy once patient status appropriate.   : stone. Send UA, [place].   Endo: ISS  ID: Zosyn, fluconazole  Heme: SCDs, SQH  Lines/drains: LIJ (12/3-), Skandia (12/3-), 2 abd JPs, feeding jtube  Wounds: midline abd wound w/wound vac. CHECO x 2

## 2016-12-03 NOTE — CONSULT NOTE ADULT - SUBJECTIVE AND OBJECTIVE BOX
HPI: As per EMR, pt intubated and sedated.    56F ho HTN diverticulits/partial colectomy, gastric bypass , persistent leaks, feeding J tube, now 1128 aamir en Y reconstruction. Pt noted to have hypoxia and acute hypotension. Prior to procedure; patient awake anxious moving all extr to command, lungs fairly clear, abd quite  tender diffusely to palpation. Pt s/p emergent ex lap with abdominal washout placement of a feeding j tube in common channel distal to Russel anastomosis, and placement of abdominal drains.  Abdominal wound left open with wound vac in place.  Intra-op, patient found to have leak of esophagojejunostomy which they could not repair due to its location. GI consulted to evaluate for endoscopic evaluate.      PAST MEDICAL & SURGICAL HISTORY:  Reflux  Obesity  DVT (deep venous thrombosis):   Depression  Diverticulitis  Hypertension  Gastric bypass status for obesity: gastric sleeve, 2012  S/P breast biopsy: , left  S/P colon resection:   S/P knee surgery: repair ,   Umbilical hernia:   S/P appendectomy:   S/P tonsillectomy:     MEDICATIONS  (STANDING):  pantoprazole  Injectable 40milliGRAM(s) IV Push daily  midazolam Infusion 1.58mG/Hr IV Continuous <Continuous>  piperacillin/tazobactam IVPB. 4.5Gram(s) IV Intermittent every 8 hours  propofol Infusion 1MICROgram(s)/kG/Min IV Continuous <Continuous>  fentaNYL   Infusion 1MICROgram(s)/kG/Hr IV Continuous <Continuous>  fluconAZOLE IVPB  IV Intermittent   insulin lispro (HumaLOG) corrective regimen sliding scale  SubCutaneous three times a day before meals  chlorhexidine 0.12% Liquid 15milliLiter(s) Swish and Spit two times a day  norepinephrine Infusion 0.02MICROgram(s)/kG/Min IV Continuous <Continuous>  albumin human  5% IVPB 250milliLiter(s) IV Intermittent once  heparin  Injectable 7500Unit(s) SubCutaneous every 8 hours  potassium phosphate IVPB 21milliMole(s) IV Intermittent once  magnesium sulfate  IVPB 2Gram(s) IV Intermittent once    MEDICATIONS  (PRN):      Allergies    sulfa drugs (Unknown)  sulfamethoxazole (Other)    Intolerances        SOCIAL HISTORY:    FAMILY HISTORY:  No pertinent family history in first degree relatives      Vital Signs Last 24 Hrs  T(C): 36.8, Max: 37.7 (12-02 @ 17:38)  T(F): 98.2, Max: 99.9 ( @ 17:38)  HR: 78 (74 - 122)  BP: 128/65 (84/47 - 154/88)  BP(mean): 91 (56 - 136)  RR: 23 (12 - 31)  SpO2: 98% (23% - 100%)    PHYSICAL EXAM:    GEN: intubated, sedated  HEENT: anicteric, central line in place  CHEST: no w/r/r  CVS: no m/r/g  ABD: distended, bs not appreciated, drains in place      LABS:                        9.5    13.4  )-----------( 362      ( 03 Dec 2016 09:36 )             30.5     03 Dec 2016 09:36    139    |  110    |  28     ----------------------------<  124    3.7     |  14     |  1.27     Ca    6.5        03 Dec 2016 09:36  Phos  2.1       03 Dec 2016 09:36  Mg     1.4       03 Dec 2016 09:36    TPro  4.1    /  Alb  1.3    /  TBili  0.8    /  DBili  x      /  AST  37     /  ALT  12     /  AlkPhos  79     03 Dec 2016 09:36    PTT - ( 03 Dec 2016 01:26 )  PTT:41.0 sec  Urinalysis Basic - ( 03 Dec 2016 09:35 )    Color: Yellow / Appearance: Clear / S.010 / pH: x  Gluc: x / Ketone: NEGATIVE  / Bili: NEGATIVE / Urobili: 0.2 E.U./dL   Blood: x / Protein: Trace mg/dL / Nitrite: POSITIVE   Leuk Esterase: NEGATIVE / RBC: < 5 /HPF / WBC < 5 /HPF   Sq Epi: x / Non Sq Epi: Few /HPF / Bacteria: Many /HPF        RADIOLOGY & ADDITIONAL STUDIES:      EXAM:  CT ABDOMEN AND PELVIS IC                           PROCEDURE DATE:  2016    Quantity of Contrast in Vial in ml: 120 Contrast Used: Omnipaque 350  Quantity of Contrast Wasted in ml: 80       INTERPRETATION:  CT of the ABDOMEN and PELVISwith intravenous contrast   dated 12/3/2016 12:56 AM    INDICATION: 56-year-old female postoperative day 3 status post SIPS   surgery, now with decompensation, hypotension, and hypoxemia.    TECHNIQUE: CT of the abdomen and pelvis was performed usingintravenous   contrast. Axial, sagittal and coronal images were produced and reviewed.    CORRELATION: Abdominal x-ray dated 2016 at 9:37 PM.    FINDINGS:    Chest findings are reported on CTA chest dated 12/3/2016 at 12:19 AM.    The liver is enlarged measuring up to 21.3 cm in craniocaudal dimension.   No focal hepatic lesions are seen. No radiopaque stones are seen within   the gallbladder. The pancreas is normal in appearance. No splenic   abnormalities are seen.    The adrenal glands are unremarkable. There is a 1.0 cm simple cyst within   the posterior interpolar region of the left kidney. The right kidney is   unremarkable. No hydronephrosis.    No abdominal aortic aneurysm is seen. There is mild lymphadenopathy   measuring up to 1.1 cm in short axis in the left paraaortic region.    Evaluation of the bowel demonstrates patient to be status post stomach   intestinal pylorus-sparing (SIPS) surgery with associated post-surgical   changes. There is moderate complex ascites throughout the abdomen and   pelvis concerning for anastomotic leak. Few small foci of peritoneal free   air are most likely postsurgical in nature however secondary infection   cannot entirely be excluded. Peritoneal drain enters the right   hemiabdomen and coils within the left lower quadrant. Gastric contents   reflux into the thoracic esophagus. Retained oral contrast is noted   within the proximal colon. There is no distal obstruction. There is   sigmoid diverticulosis. Generalized anasarca is noted.    Images of the pelvis demonstrate the uterus and adnexae to be normal in   appearance. Yu catheter decompresses the urinary bladder.    Evaluation of the osseous structures demonstrates grade 1 anterolisthesis   of L4 on L5. There are moderatemultilevel degenerative changes.      IMPRESSION:  1.  Status post SIPS surgery with moderate complex ascites and few foci   of intra-abdominal free air, concerning for an anastomotic leak.   Secondary infection of peritoneal contents cannot entirelybe excluded.  2.  Mild periaortic lymphadenopathy.  3.  Anasarca.  4.  Hepatomegaly.  5.  Diverticulosis coli.

## 2016-12-04 NOTE — CONSULT NOTE ADULT - PROBLEM SELECTOR RECOMMENDATION 9
- oliguric renal failure with creatinine up trending  - suspect ATN in the setting of hypoperfusion requiring high doses of vasopressors.  IV contrast may have contributed as well in the setting of hypotension/hypoperfusion.    - recommend keeping SBP >110 for now - no urgency to titrate off vasopressors  - check UA, urine studies (urine sodium, chloride, potassium, cr, osm)  - check renal ultrasound  - patient has received 80 mg of IV lasix, however, given falling GFR will require high dose to assess for responsiveness.  Can give lasix 120 mg IV   - patient with hypervolemic volume status   - net positive >11 liters  - if no improvement then likely will need renal replacement therapy   - currently K is acceptable, FiO2 acceptable (though patient with b/l pleural effusions), bicarb and pH acceptable - no urgent indication for HD today  - renally dose all medications for GFR < 5-10 as patient with minimal UOP  - avoid NSAIDs and other nephrotoxic medications - oliguric renal failure with creatinine up trending  - suspect ATN in the setting of hypoperfusion requiring high doses of vasopressors.  IV contrast may have contributed as well in the setting of hypotension/hypoperfusion.    - recommend keeping SBP >110 for now   - check UA, urine studies (urine sodium, chloride, potassium, cr, osm)  - check renal ultrasound  - patient has received 80 mg of IV lasix, however, given falling GFR will require high dose to assess for responsiveness.  Can give lasix 120 mg IV   - patient with hypervolemic volume status   - net positive >11 liters  - if no improvement then likely will need renal replacement therapy   - currently K is acceptable, FiO2 acceptable (though patient with b/l pleural effusions), bicarb and pH acceptable - no urgent indication for HD today  - renally dose all medications for GFR < 5-10 as patient with minimal UOP  - avoid NSAIDs and other nephrotoxic medications

## 2016-12-04 NOTE — PROGRESS NOTE ADULT - SUBJECTIVE AND OBJECTIVE BOX
Interval Events: O/N : BRAYDEN  12/3: Given 5% albumin x 3. lactate downtrending. levo requirements improving. bladder pressure 17@330, will continue to monitor q6hrs. ATN - Abx renally dosed. started stress dose steroids.     Patient seen and examined at bedside. Sedation held for exam.      Allergies    sulfa drugs (Unknown)  sulfamethoxazole (Other)    Intolerances        Vital Signs Last 24 Hrs  T(C): 36.4, Max: 37.8 ( @ 22:36)  T(F): 97.6, Max: 100 ( @ 22:36)  HR: 70 (69 - 85)  BP: 98/74 (85/49 - 128/65)  BP(mean): 84 (66 - 91)  RR: 18 (18 - 124)  SpO2: 99% (95% - 99%)  I & Os for 24h ending  @ 07:00  =============================================  IN: 4399 ml / OUT: 478 ml / NET: 3921 ml    I & Os for current day (as of  @ 10:28)  =============================================  IN: 129 ml / OUT: 29 ml / NET: 100 ml  I & Os for 24h ending  @ 07:00  =============================================  IN: 4399 ml / OUT: 478 ml / NET: 3921 ml    I & Os for current day (as of  @ 10:28)  =============================================  IN: 129 ml / OUT: 29 ml / NET: 100 ml        LABS:  ABG - ( 03 Dec 2016 18:48 )  pH: 7.39  /  pCO2: 26    /  pO2: 102   / HCO3: 15    / Base Excess: -8.8  /  SaO2: 97          CBC Full  -  ( 04 Dec 2016 05:50 )  WBC Count : 12.0 K/uL  Hemoglobin : 7.1 g/dL  Hematocrit : 21.0 %  Platelet Count - Automated : 163 K/uL  Mean Cell Volume : 70.9 fL  Mean Cell Hemoglobin : 24.0 pg  Mean Cell Hemoglobin Concentration : 33.8 g/dL      04 Dec 2016 05:50    138    |  109    |  29     ----------------------------<  120    4.1     |  14     |  1.83     Ca    7.2        04 Dec 2016 05:50  Phos  2.5       04 Dec 2016 05:50  Mg     2.8       04 Dec 2016 05:50    TPro  4.4    /  Alb  2.0    /  TBili  1.2    /  DBili  x      /  AST  15     /  ALT  10     /  AlkPhos  110    04 Dec 2016 05:50    PT/INR - ( 04 Dec 2016 05:50 )   PT: 17.2 sec;   INR: 1.54          PTT - ( 04 Dec 2016 05:50 )  PTT:45.0 sec      Urinalysis Basic - ( 03 Dec 2016 09:35 )    Color: Yellow / Appearance: Clear / S.010 / pH: x  Gluc: x / Ketone: NEGATIVE  / Bili: NEGATIVE / Urobili: 0.2 E.U./dL   Blood: x / Protein: Trace mg/dL / Nitrite: POSITIVE   Leuk Esterase: NEGATIVE / RBC: < 5 /HPF / WBC < 5 /HPF   Sq Epi: x / Non Sq Epi: Few /HPF / Bacteria: Many /HPF        RADIOLOGY & ADDITIONAL STUDIES (The following images were personally reviewed):      Physical Exam:     Neuro: Sedation held for exam. Pt arousable and intermittently following commands. PERRL.  CV: RRR.    Pulm: diminished BS at b/l bases. No w/r/r  Abd: softly distended. Wound vac to midline. CHECO x 2 with mostly serous output.   : Yu to gravity with scant urine  Ext: 1+ pitting edema x 4. WWP x 4.

## 2016-12-04 NOTE — PROGRESS NOTE ADULT - ATTENDING COMMENTS
sedate on vent awakens easily, chest ronchi, cor rr, abd distend soft, decreased bs, ext edema venodynes  minimal levo, bladder presure decreaed, in ARF    A -postoperative respiratory failure/ATN/ acute renal failure/intra-abd sepsis/anemia/obesity    Suggest  taper levo to off  continue piptazo for 7 days  no fluid other than med albumin  transfuse for hgb< 7  maintain on AC  minimize sedation  try diruectic dose  renal to see

## 2016-12-04 NOTE — PROGRESS NOTE ADULT - ASSESSMENT
56 year old critical patient s/p SIPs procedure with free air concerning for anastomotic leak s/p emergent ex lap with abdominal washout found to have leak of esophagojejunostomy which they could not repair due to its location. GI consulted to evaluate for possible Endoscopic evaluation; patient pressor requirement markedly improved with MAP >65. Patient remains oliguric. Will plan for endoscopic intervention if pt continues to clinically improve.  Will continue to monitor.     -NPO   -Cont IV abx  -IVF, pressors  -Serial abdominal exams  -Endoscopy pending  -Care per SICU      -GI following.     Case d/w Dr. Sumner

## 2016-12-04 NOTE — PROGRESS NOTE ADULT - ASSESSMENT
56 year old female s/p SIPS procedure c/b anastamotic leak at esophago-jej anastamosis now s/p take back to OR for exploratory laparotomy, abdominal washout, placement of a feeding j-tube in common channel distal to RY anastomosis, and placement of abdominal drains.     Neuro: propofol gtt, fentanyl gtt  CVS: goal MAP>70; levophed, normotensive goals. Albumin 25% q8hrs. Will give lasix 80 IV x 1.  Pulm: AC  FEN/GI: NPO/NGT to LIWS. J-tube in place. Protonix  : Yu, post op ATN. Renal consult.   Endo: ISS. hydrocortisone 14q6cmb  ID: renally dosed Zosyn, Fluconazole  Heme: SCDs, SQH  Lines/drains: LIJ (12/3-), Radha (12/3-), 2 abd JPs, feeding J-tube  Wounds: midline abd wound w/wound vac

## 2016-12-04 NOTE — CONSULT NOTE ADULT - SUBJECTIVE AND OBJECTIVE BOX
HPI:  56 year old female s/p SIPS procedure c/b anastamotic leak at esophago-jej anastamosis now s/p take back to OR for exploratory laparotomy, abdominal washout, placement of a feeding j-tube in common channel distal to RY anastomosis, and placement of abdominal drains.  Renal consulted on 16 for oliguric renal failure.  Of note, patient has received IV contrast for CT scan on 12/3/16 and in addition, she had persistent hypotension requiring vasopressors.     PAST MEDICAL & SURGICAL HISTORY:  Reflux  Obesity  DVT (deep venous thrombosis):   Depression  Diverticulitis  Hypertension  Gastric bypass status for obesity: gastric sleeve, 2012  S/P breast biopsy: , left  S/P colon resection:   S/P knee surgery: repair ,   Umbilical hernia:   S/P appendectomy:   S/P tonsillectomy:       MEDICATIONS:  pantoprazole  Injectable 40milliGRAM(s) IV Push daily  propofol Infusion 1MICROgram(s)/kG/Min IV Continuous <Continuous>  fentaNYL   Infusion 1MICROgram(s)/kG/Hr IV Continuous <Continuous>  insulin lispro (HumaLOG) corrective regimen sliding scale  SubCutaneous three times a day before meals  chlorhexidine 0.12% Liquid 15milliLiter(s) Swish and Spit two times a day  heparin  Injectable 5000Unit(s) SubCutaneous every 8 hours  albumin human 25% IVPB 50milliLiter(s) IV Intermittent every 8 hours  norepinephrine Infusion 0.02MICROgram(s)/kG/Min IV Continuous <Continuous>  piperacillin/tazobactam IVPB. 2.25Gram(s) IV Intermittent every 6 hours  hydrocortisone  Injectable 50milliGRAM(s) IV Push every 6 hours  fluconAZOLE IVPB 200milliGRAM(s) IV Intermittent every 24 hours      REVIEW OF SYSTEMS:  Unable to obtain as patient is intubated and sedated.    PHYSICAL EXAM:  I & Os for 24h ending  @ 07:00  =============================================  IN: 4399 ml / OUT: 478 ml / NET: 3921 ml    I & Os for current day (as of  @ 16:38)  =============================================  IN: 489 ml / OUT: 149 ml / NET: 340 ml    Constitutional: T(C): 35.6, Max: 37.8 (12-03 @ 22:36)  HR: 67 (67 - 82)  BP: 107/74 (90/54 - 110/74)  RR: 22 (18 - 124)  SpO2: 99% (96% - 99%)ht touch.    General - Intubated and sedated but arousable.  NAD  HEENT - MMM. No cyanosis.  CV - S1, S2. RRR.   Lungs - Decreased breath sounds at b/l bases  Abd - Soft. Distended. Decreased Bowel sounds.   Ext - +edema in upper and lower extremities    DATA:  138    |  109<H>  |  29<H>  ----------------------------<  120<H>  Ca:7.2<L> (04 Dec 2016 05:50)  4.1     |  14<L>  |  1.83<H>      eGFR if Non : 30 <L>  eGFR if : 35 <L>    TPro  4.4 g/dL<L>  /  Alb  2.0 g/dL<L>  /  TBili  1.2 mg/dL  /  DBili  x      /  AST  15 U/L  /  ALT  10 U/L<L>  /  AlkPhos  110 U/L  04 Dec 2016 05:50                        7.1<L>  12.0<H> )-----------( 163      ( 04 Dec 2016 05:50 )             21.0<LL>    Urinalysis Basic - ( 03 Dec 2016 09:35 )  Color: Yellow / Appearance: Clear / S.010 / pH: x  Gluc: x / Ketone: NEGATIVE  / Bili: NEGATIVE / Urobili: 0.2 E.U./dL   Blood: x / Protein: Trace mg/dL<!!> / Nitrite: POSITIVE<!!>   Leuk Esterase: NEGATIVE / RBC: < 5 /HPF / WBC < 5 /HPF   Sq Epi: x / Non Sq Epi: Few /HPF / Bacteria: Many /HPF<!!>      ABG - ( 03 Dec 2016 18:48 )  pH: 7.39  /  pCO2: 26    /  pO2: 102   / HCO3: 15    / Base Excess: -8.8  /  SaO2: 97

## 2016-12-04 NOTE — CONSULT NOTE ADULT - ATTENDING COMMENTS
as above
Acute oliguric renal failure. Needs better renal perfusion with increase in /systemic BP.  Also try 160 mg lasix to promote urine flow.  Please obtain repeat UA and urine Na    If no improvement nl replacement therapy within next 24 to 48 hours

## 2016-12-04 NOTE — PROGRESS NOTE ADULT - SUBJECTIVE AND OBJECTIVE BOX
Pt seen and examined at bedside. Intubated and sedated, although nurse reports pt was on sedation holiday this am and pt responsive.  Pressor requirement markedly increased, although pt continues to be oliguric.      REVIEW OF SYSTEMS:  Constitutional: No fever, weight loss or fatigue  Cardiovascular: No chest pain, palpitations, dizziness or leg swelling  Gastrointestinal: No abdominal or epigastric pain. No nausea, vomiting or hematemesis; No diarrhea or constipation. No melena or hematochezia.  Skin: No itching, burning, rashes or lesions       MEDICATIONS:  MEDICATIONS  (STANDING):  pantoprazole  Injectable 40milliGRAM(s) IV Push daily  propofol Infusion 1MICROgram(s)/kG/Min IV Continuous <Continuous>  fentaNYL   Infusion 1MICROgram(s)/kG/Hr IV Continuous <Continuous>  insulin lispro (HumaLOG) corrective regimen sliding scale  SubCutaneous three times a day before meals  chlorhexidine 0.12% Liquid 15milliLiter(s) Swish and Spit two times a day  heparin  Injectable 5000Unit(s) SubCutaneous every 8 hours  albumin human 25% IVPB 50milliLiter(s) IV Intermittent every 8 hours  norepinephrine Infusion 0.02MICROgram(s)/kG/Min IV Continuous <Continuous>  piperacillin/tazobactam IVPB. 2.25Gram(s) IV Intermittent every 6 hours  hydrocortisone  Injectable 50milliGRAM(s) IV Push every 6 hours  fluconAZOLE IVPB 200milliGRAM(s) IV Intermittent every 24 hours    MEDICATIONS  (PRN):      Allergies    sulfa drugs (Unknown)  sulfamethoxazole (Other)    Intolerances        Vital Signs Last 24 Hrs  T(C): 36.4, Max: 37.8 ( @ 22:36)  T(F): 97.6, Max: 100 ( @ 22:36)  HR: 70 (69 - 85)  BP: 98/74 (85/49 - 128/65)  BP(mean): 84 (66 - 91)  RR: 18 (18 - 124)  SpO2: 99% (95% - 99%)  I & Os for 24h ending  @ 07:00  =============================================  IN: 4399 ml / OUT: 478 ml / NET: 3921 ml    I & Os for current day (as of  @ 10:21)  =============================================  IN: 129 ml / OUT: 29 ml / NET: 100 ml      PHYSICAL EXAM:    General: intubated, sedated   HEENT: anicteric   Gastrointestinal: Soft non-tender non-distended, scant BS, Incision site packed; w/out discharge     LABS:  ABG - ( 03 Dec 2016 18:48 )  pH: 7.39  /  pCO2: 26    /  pO2: 102   / HCO3: 15    / Base Excess: -8.8  /  SaO2: 97                  CBC Full  -  ( 04 Dec 2016 05:50 )  WBC Count : 12.0 K/uL  Hemoglobin : 7.1 g/dL  Hematocrit : 21.0 %  Platelet Count - Automated : 163 K/uL  Mean Cell Volume : 70.9 fL  Mean Cell Hemoglobin : 24.0 pg  Mean Cell Hemoglobin Concentration : 33.8 g/dL      04 Dec 2016 05:50    138    |  109    |  29     ----------------------------<  120    4.1     |  14     |  1.83     Ca    7.2        04 Dec 2016 05:50  Phos  2.5       04 Dec 2016 05:50  Mg     2.8       04 Dec 2016 05:50    TPro  4.4    /  Alb  2.0    /  TBili  1.2    /  DBili  x      /  AST  15     /  ALT  10     /  AlkPhos  110    04 Dec 2016 05:50    PT/INR - ( 04 Dec 2016 05:50 )   PT: 17.2 sec;   INR: 1.54          PTT - ( 04 Dec 2016 05:50 )  PTT:45.0 sec      Urinalysis Basic - ( 03 Dec 2016 09:35 )    Color: Yellow / Appearance: Clear / S.010 / pH: x  Gluc: x / Ketone: NEGATIVE  / Bili: NEGATIVE / Urobili: 0.2 E.U./dL   Blood: x / Protein: Trace mg/dL / Nitrite: POSITIVE   Leuk Esterase: NEGATIVE / RBC: < 5 /HPF / WBC < 5 /HPF   Sq Epi: x / Non Sq Epi: Few /HPF / Bacteria: Many /HPF                RADIOLOGY & ADDITIONAL STUDIES (The following images were personally reviewed):

## 2016-12-05 NOTE — PROGRESS NOTE ADULT - SUBJECTIVE AND OBJECTIVE BOX
Patient seen and examined by me at bedside.  Intubated. Sedated     PAST MEDICAL & SURGICAL HISTORY:  Reflux  Obesity  DVT (deep venous thrombosis):   Depression  Diverticulitis  Hypertension  Gastric bypass status for obesity: gastric sleeve, 2012  S/P breast biopsy: , left  S/P colon resection:   S/P knee surgery: repair ,   Umbilical hernia:   S/P appendectomy:   S/P tonsillectomy:     PHYSICAL EXAM:  T(C): 36.2, Max: 36.8 ( @ 05:57)  HR: 66  BP: 106/78 (90/54 - 110/74)  RR: 21  SpO2: 99%  Wt(kg): --  I & Os for 24h ending  @ 07:00  =============================================  IN:    fentaNYL  Infusion: 335 ml    sodium chloride 0.9%: 335 ml    IV PiggyBack: 250 ml    IV Solution: 110 ml    propofol Infusion: 110 ml    norepinephrine Infusion: 20.5 ml    sodium chloride 0.9%: 20.5 ml    Total IN: 1181 ml  ---------------------------------------------  OUT:    Ureteral Catheter: 604 ml    Bulb: 50 ml    Bulb: 27.5 ml    Total OUT: 681.5 ml  ---------------------------------------------  Total NET: 499.5 ml    I & Os for current day (as of  @ 10:45)  =============================================  IN:    fentaNYL  Infusion: 26 ml    sodium chloride 0.9%: 26 ml    Total IN: 52 ml  ---------------------------------------------  OUT:    Ureteral Catheter: 210 ml    Total OUT: 210 ml  ---------------------------------------------  Total NET: -158 ml    Weight 79 ( @ 02:01)  General: Intubated, Arousable, NAD.  HEENT: moist mucous membranes, no pallor/cyanosis.  Cardiac: S1, S2. RRR. No murmurs   Respratory: Decreased breath sounds at bases  Abdomen: soft. Distended. NT  Skin: no rashes.  Extremities: +b/l LE edema    DATA:                        6.6<LL>  10.9<H> )-----------( 135<L>    ( 05 Dec 2016 06:23 )             20.3<LL>        139    |  112<H>  |  43<H>  ----------------------------<  116<H>  Ca:7.6<L> (05 Dec 2016 06:24)  4.2     |  12<L>  |  2.42<H>      eGFR if Non : 22 <L>  eGFR if : 25 <L>    TPro  4.6 g/dL<L>  /  Alb  1.7 g/dL<L>  /  TBili  1.0 mg/dL  /  DBili  x      /  AST  10 U/L<L>  /  ALT  9 U/L<L>  /  AlkPhos  127 U/L<H>  05 Dec 2016 06:24        Urinalysis Basic - ( 04 Dec 2016 17:33 )  Color: Yellow / Appearance: SL CLOUDY / S.010 / pH: x  Gluc: x / Ketone: NEGATIVE  / Bili: NEGATIVE / Urobili: 0.2 E.U./dL   Blood: x / Protein: NEGATIVE mg/dL / Nitrite: NEGATIVE   Leuk Esterase: NEGATIVE / RBC: > 10 /HPF<!!> / WBC < 5 /HPF   Sq Epi: x / Non Sq Epi: Few /HPF / Bacteria: Present /HPF<!!>      Sodium, Random Urine: 39 mmoL/L ( @ 17:33)  Potassium, Random Urine: 30 mmoL/L ( @ 17:33)  Chloride, Random Urine: 63 mmoL/L ( @ 17:33)  Creatinine, Random Urine: 37.7 mg/dL ( @ 17:33)  Osmolality, Random Urine: 252 mosmol/kg ( @ 17:33)      MEDICATIONS  (STANDING):  pantoprazole  Injectable 40milliGRAM(s) IV Push daily  propofol Infusion 1MICROgram(s)/kG/Min IV Continuous <Continuous>  fentaNYL   Infusion 1MICROgram(s)/kG/Hr IV Continuous <Continuous>  insulin lispro (HumaLOG) corrective regimen sliding scale  SubCutaneous Before meals and at bedtime  chlorhexidine 0.12% Liquid 15milliLiter(s) Swish and Spit two times a day  heparin  Injectable 5000Unit(s) SubCutaneous every 8 hours  albumin human 25% IVPB 50milliLiter(s) IV Intermittent every 8 hours  piperacillin/tazobactam IVPB. 2.25Gram(s) IV Intermittent every 6 hours  fluconAZOLE IVPB 200milliGRAM(s) IV Intermittent every 24 hours  hydrocortisone  Injectable 50milliGRAM(s) IV Push every 8 hours    MEDICATIONS  (PRN):

## 2016-12-05 NOTE — PROGRESS NOTE ADULT - ATTENDING COMMENTS
Reviewed with SICU team and Dr. Jung-   may need dialysis within next 24-48 hours- but since pt able to produce 150 ml urine earlier this AM- would try additional lasix and raise BP with pressors to improve renal perfusion.  Dialysis will not quickly remove pleural effusions so if goal is to quickly extubate pt may benefit from thoracentesis  Also may benefit from Transfusion of PRBC's

## 2016-12-05 NOTE — PROGRESS NOTE ADULT - ASSESSMENT
56 year old female s/p SIPS procedure c/b anastamotic leak at esophago-jej anastamosis now s/p take back to OR for exploratory laparotomy, abdominal washout, placement of a feeding j-tube in common channel distal to RY anastomosis, and placement of abdominal drains.     Neuro:  fentanyl gtt  CVS: goal MAP>70, normotensive goals.  Albumin 25% q8hrs.   Pulm: Intubated AC   FEN/GI: NPO/NGT  to LIWS. J-tube in place. Protonix  : stone, post op ATN  Endo: ISS. hydrocortisone taper.   ID: renally dosed Zosyn (12/3--), fluconazole (12/4--),   Heme: SCDs, SQH  Lines/drains: LIJ (12/3-), Radha (12/3-), 2 abd JPs, feeding jtube  Wounds: midline abd wound w/wound vac

## 2016-12-05 NOTE — PROGRESS NOTE ADULT - SUBJECTIVE AND OBJECTIVE BOX
S: NAEO.  AM hgb low, likely primarily dilutional.  Pt over-resuscitated.  Attempting to diurese.  Responding to lasix.  Per nephro- no need for HD today.  Intubated/sedated.  Unable to obtain ROS.    O: Vitals: Vital Signs Last 24 Hrs  T(C): 37, Max: 37 ( @ 18:25)  T(F): 98.6, Max: 98.6 ( @ 18:25)  HR: 77 (64 - 77)  BP: 114/80 (95/68 - 116/75)  BP(mean): 93 (77 - 93)  RR: 20 (18 - 23)  SpO2: 100% (98% - 100%)    CAPILLARY BLOOD GLUCOSE  120 (05 Dec 2016 16:00)  138 (05 Dec 2016 11:00)  151 (05 Dec 2016 06:00)      PHYSICAL EXAM:  Neuro: Sedated, can be aroused/follow commands, PERRL.  CV: RRR, no m/r/g    Pulm: diminished BS at b/l bases. No w/r/r  Abd: softly distended. Wound vac to midline. CHECO x 2 with mostly serous output. Jtube in place  : Yu to gravity with scant urine  Ext: 1+ pitting edema x 4. WWP x 4.     I & Os for 24h ending  @ 07:00  =============================================  IN: 1181 ml / OUT: 681.5 ml / NET: 499.5 ml    I & Os for current day (as of  @ 19:19)  =============================================  IN: 710 ml / OUT: 568 ml / NET: 142 ml      Labs:                         6.6    10.9  )-----------( 135      ( 05 Dec 2016 06:23 )             20.3   05 Dec 2016 06:24    139    |  112    |  43     ----------------------------<  116    4.2     |  12     |  2.42     Ca    7.6        05 Dec 2016 06:24  Phos  3.4       05 Dec 2016 06:24  Mg     2.9       05 Dec 2016 06:24    TPro  4.6    /  Alb  1.7    /  TBili  1.0    /  DBili  x      /  AST  10     /  ALT  9      /  AlkPhos  127    05 Dec 2016 06:24  PT/INR - ( 05 Dec 2016 06:23 )   PT: 14.2 sec;   INR: 1.28          PTT - ( 05 Dec 2016 06:23 )  PTT:37.2 secUrinalysis Basic - ( 04 Dec 2016 17:33 )    Color: Yellow / Appearance: SL CLOUDY / S.010 / pH: x  Gluc: x / Ketone: NEGATIVE  / Bili: NEGATIVE / Urobili: 0.2 E.U./dL   Blood: x / Protein: NEGATIVE mg/dL / Nitrite: NEGATIVE   Leuk Esterase: NEGATIVE / RBC: > 10 /HPF / WBC < 5 /HPF   Sq Epi: x / Non Sq Epi: Few /HPF / Bacteria: Present /HPF        RADIOLOGY & OTHER STUDIES/INTERVENTIONS:  No new

## 2016-12-05 NOTE — PROGRESS NOTE ADULT - PROBLEM SELECTOR PLAN 1
- likely ATN  - patient non-oliguric with lasix given overnight  - showing some responsiveness to diuretics  - would recommend lasix 80 mg IV BID for now as long as she is responding.  If UOP drops than can give additional 80 mg IV.    - Would prefer to hold off RRT for now and await for renal recovery  - keep SBP > 110

## 2016-12-06 NOTE — PROGRESS NOTE ADULT - ASSESSMENT
56 year old female s/p SIPS procedure c/b anastamotic leak at esophago-jej anastamosis now s/p take back to OR for exploratory laparotomy, abdominal washout, placement of a feeding j-tube in common channel distal to RY anastomosis, and placement of abdominal drains.     Neuro:  fentanyl gtt  CVS: goal MAP>70, normotensive goals.  Albumin 25% q8hrs.   Pulm: Intubated AC   FEN/GI: NPO/NGT  to LIWS. Trickle feeds via J-tube. Protonix  : stone, post op ATN, lasix prn  Endo: ISS. hydrocortisone taper.   ID: renally dosed Zosyn (12/3--), fluconazole (12/4--),   Heme: SCDs, SQH  Lines/drains: LIJ (12/3-), [place] (12/3-), 2 abd JPs, feeding jtube  Wounds: midline abd wound w/wound vac

## 2016-12-06 NOTE — PROGRESS NOTE ADULT - SUBJECTIVE AND OBJECTIVE BOX
Patient seen and examined by me at bedside.  Intubated.      PAST MEDICAL & SURGICAL HISTORY:  Reflux  Obesity  DVT (deep venous thrombosis):   Depression  Diverticulitis  Hypertension  Gastric bypass status for obesity: gastric sleeve, 2012  S/P breast biopsy: , left  S/P colon resection:   S/P knee surgery: repair ,   Umbilical hernia:   S/P appendectomy:   S/P tonsillectomy:     PHYSICAL EXAM:  T(C): 36.3, Max: 37 ( @ 18:25)  HR: 63  BP: 131/88 (104/74 - 131/88)  RR: 23  SpO2: 99%  Wt(kg): --  I & Os for 24h ending  @ 07:00  =============================================  IN:    IV PiggyBack: 600 ml    Packed Red Blood Cells: 450 ml    fentaNYL  Infusion: 182 ml    sodium chloride 0.9%: 182 ml    Total IN: 1414 ml  ---------------------------------------------  OUT:    Ureteral Catheter: 2183 ml    Bulb: 20.5 ml    Bulb: 15 ml    VAC (Vacuum Assisted Closure) System: 5 ml    Total OUT: 2223.5 ml  ---------------------------------------------  Total NET: -809.5 ml    I & Os for current day (as of  @ 09:46)  =============================================  IN:    Osmolite: 20 ml    fentaNYL  Infusion: 14 ml    sodium chloride 0.9%: 14 ml    Total IN: 48 ml  ---------------------------------------------  OUT:    Ureteral Catheter: 380 ml    Total OUT: 380 ml  ---------------------------------------------  Total NET: -332 ml    Weight 79 ( @ 02:01)  General: Intubated. Arousable. NAD  HEENT: moist mucous membranes, no pallor/cyanosis.  Cardiac: S1, S2. RRR. No murmurs   Respratory: decreased breath sounds at bases, no access muscle use.   Abdomen: soft. nontender. nondistended  Skin: no rashes.  Extremities: + LE edema b/l  Access: left IJ hd catheter      DATA:                        7.6<L>  12.7<H> )-----------( 106<L>    ( 06 Dec 2016 05:03 )             22.6<L>        141    |  112<H>  |  49<H>  ----------------------------<  126<H>  Ca:7.4<L> (06 Dec 2016 05:03)  4.0     |  14<L>  |  2.14<H>      eGFR if Non : 25 <L>  eGFR if : 29 <L>    TPro  4.6 g/dL<L>  /  Alb  1.7 g/dL<L>  /  TBili  1.0 mg/dL  /  DBili  x      /  AST  10 U/L<L>  /  ALT  9 U/L<L>  /  AlkPhos  127 U/L<H>  05 Dec 2016 06:24        Urinalysis Basic - ( 04 Dec 2016 17:33 )  Color: Yellow / Appearance: SL CLOUDY / S.010 / pH: x  Gluc: x / Ketone: NEGATIVE  / Bili: NEGATIVE / Urobili: 0.2 E.U./dL   Blood: x / Protein: NEGATIVE mg/dL / Nitrite: NEGATIVE   Leuk Esterase: NEGATIVE / RBC: > 10 /HPF<!!> / WBC < 5 /HPF   Sq Epi: x / Non Sq Epi: Few /HPF / Bacteria: Present /HPF<!!>      Sodium, Random Urine: 39 mmoL/L ( @ 17:33)  Potassium, Random Urine: 30 mmoL/L ( @ 17:33)  Chloride, Random Urine: 63 mmoL/L ( @ 17:33)  Creatinine, Random Urine: 37.7 mg/dL ( @ 17:33)  Osmolality, Random Urine: 252 mosmol/kg ( @ 17:33)            MEDICATIONS  (STANDING):  pantoprazole  Injectable 40milliGRAM(s) IV Push daily  fentaNYL   Infusion 1MICROgram(s)/kG/Hr IV Continuous <Continuous>  insulin lispro (HumaLOG) corrective regimen sliding scale  SubCutaneous Before meals and at bedtime  chlorhexidine 0.12% Liquid 15milliLiter(s) Swish and Spit two times a day  heparin  Injectable 5000Unit(s) SubCutaneous every 8 hours  albumin human 25% IVPB 50milliLiter(s) IV Intermittent every 8 hours  piperacillin/tazobactam IVPB. 2.25Gram(s) IV Intermittent every 6 hours  fluconAZOLE IVPB 200milliGRAM(s) IV Intermittent every 24 hours  hydrocortisone  Injectable 50milliGRAM(s) IV Push every 12 hours  lidocaine 1% Injectable 10milliLiter(s) Local Injection once  heparin  flush 100 Units/mL Injectable 100Unit(s) IV Push every other day  furosemide Infusion 5mG/Hr IV Continuous <Continuous>    MEDICATIONS  (PRN):

## 2016-12-06 NOTE — PROGRESS NOTE ADULT - PROBLEM SELECTOR PLAN 1
- likely ATN  - patient showing signs of recovery  - responding well to lasix now with -200 cc/hr  - can start lasix gtt for now or even just give lasix 80 mg IV TID   - BP also higher which is likely helping with renal perfusion  - would hold off RRT for now  - can consider other forms of UF (i.e. aquapheresis) if additional UF is needed, however, for now diuresis is sufficient

## 2016-12-06 NOTE — PROGRESS NOTE ADULT - ATTENDING COMMENTS
the patient was seen and examined. I discussed the case on rounds twice during the day with the surgical staff. I reviewed all the medication list, laboratory data, and chest x-ray. The plan was outlined above.  the patient is clinically stable on the ventilator. The chest x-ray was consistent with fluid overload. Diuresis increased with adequate response to Lasix. The patient is not requiring hemodialysis. Patient is on antibiotics. Patient is hemodynamically stable. Evaluate in the morning for possible weaning from mechanical ventilation the patient was seen and examined. I discussed the case on rounds twice during the day with the surgical staff. I reviewed all the medication list, laboratory data, and chest x-ray. The plan was outlined above. the patient is clear stable with adequate urine output. She has responded to Lasix and admitted 80 mg twice a day. She had fluid balance negative -3-1/2 L. Her creatinine is decreasing. Doesn't was replaced. She is to continue her current antibiotic. She failed a weaning trial this morning

## 2016-12-06 NOTE — PROGRESS NOTE ADULT - SUBJECTIVE AND OBJECTIVE BOX
S: NAEO.  Pt reports    O: Vitals: Vital Signs Last 24 Hrs  T(C): 37.1, Max: 37.1 (12-06 @ 14:56)  T(F): 98.8, Max: 98.8 (12-06 @ 14:56)  HR: 73 (63 - 76)  BP: 144/90 (116/78 - 144/90)  BP(mean): 110 (90 - 110)  RR: 20 (20 - 23)  SpO2: 97% (95% - 100%)    CAPILLARY BLOOD GLUCOSE  142 (06 Dec 2016 17:00)  138 (06 Dec 2016 11:00)      PHYSICAL EXAM:  Neuro: Sedated, can be aroused/follow commands, PERRL.  CV: RRR, no m/r/g    Pulm: diminished BS at b/l bases. No w/r/r  Abd: softly distended. Wound vac to midline. CHECO x 2 with mostly serous output. Jtube in place  : Yu to gravity with scant urine  Ext: 1+ pitting edema x 4. WWP x 4.     I & Os for 24h ending 12-06 @ 07:00  =============================================  IN: 1414 ml / OUT: 2223.5 ml / NET: -809.5 ml    I & Os for current day (as of 12-06 @ 18:54)  =============================================  IN: 426 ml / OUT: 1930 ml / NET: -1504 ml    Labs:                         7.6    12.7  )-----------( 106      ( 06 Dec 2016 05:03 )             22.6   06 Dec 2016 05:03    141    |  112    |  49     ----------------------------<  126    4.0     |  14     |  2.14     Ca    7.4        06 Dec 2016 05:03  Phos  4.3       06 Dec 2016 05:03  Mg     2.9       06 Dec 2016 05:03    TPro  4.6    /  Alb  1.7    /  TBili  1.0    /  DBili  x      /  AST  10     /  ALT  9      /  AlkPhos  127    05 Dec 2016 06:24  PT/INR - ( 05 Dec 2016 06:23 )   PT: 14.2 sec;   INR: 1.28          PTT - ( 05 Dec 2016 06:23 )  PTT:37.2 sec    RADIOLOGY & OTHER STUDIES/INTERVENTIONS:  EXAM:  XR CHEST 1 VIEW PORT URGENT                           PROCEDURE DATE:  12/05/2016                 INTERPRETATION:    AP Portable CXR dated 12/5/2016 11:27 PM    CLINICAL INFORMATION: 56 years, Female, dialysis cath placement    PRIOR STUDIES: Portable CXR on 12/5/2016    FINDINGS:   Lines, Catheters & Support Devices: There is an endotracheal tube in   appropriate position as well as 2 Left-sided IJV catheters, both at the   cavoatrial junction. There is a nasogastric tube which remains located   within the esophagus.    Heart Size, Mediastinum & Hilar Contours: Heart size is suboptimally   evaluated in this projection. Mediastinal and hilar contours poorly   evaluated secondary to adhesions.      Lungs: Low lung volumes. Bilateral pleural effusions. Retrocardiac   opacity.  No pneumothorax.     Bones/Soft Tissues: No acute abnormalities of the soft tissues and   osseous structures. Degenerative changes noted.    IMPRESSION:  Distal repositioning of the nasogastric tube is recommended.  Appropriate positioning of a left-sided IJV catheter.  Bilateral pleural effusions and low lung volumes.  Retrocardiac opacity may be secondary to effusion, atelectasis or   pneumonia.

## 2016-12-06 NOTE — PROGRESS NOTE ADULT - ATTENDING COMMENTS
urine output much improved with use of Lasix, improvement in BP and tover time.  Defer renal replacement  therapy for today- continue to trend B/C and electrolytes- keep BP > 120 systolic  If unable to reach and maintain negative fluid balance or unable to extubate b/c of her excessive TBW may need to proceed with aquapheresis- but prefer trying to force diuresis with Lasix.

## 2016-12-07 NOTE — PROGRESS NOTE ADULT - PROBLEM SELECTOR PLAN 1
- likely ATN  - patient's creatinine and UOP improving  - if UOP < 100 cc/hr then recommend lasix 80 mg IV x 1 (replete hyperkalemia first)  - no indication for RRT as patient mobilizing fluids

## 2016-12-07 NOTE — PROGRESS NOTE ADULT - SUBJECTIVE AND OBJECTIVE BOX
doing better  wbc normal  urine output brisk  has secretions  will get egd and possible internal drain vs stent today or tomorrow  as intubated and this is good window

## 2016-12-07 NOTE — PROGRESS NOTE ADULT - ASSESSMENT
56 year old female s/p SIPS procedure c/b anastamotic leak at esophago-jej anastamosis now s/p take back to OR for exploratory laparotomy, abdominal washout, placement of a feeding j-tube in common channel distal to RY anastomosis, and placement of abdominal drains.     Neuro:  fentanyl PRN  CVS: goal MAP>70, normotensive goals.  Albumin 25% q8hrs.   Pulm: Intubated AC   FEN/GI: NPO/NGT  to LIWS. J-tube tube feed to goal. Protonix  : stone, post op ATN, lasix prn  Endo: ISS. hydrocortisone taper.   ID: renally dosed Zosyn (12/3--), fluconazole (12/4--),   Heme: SCDs, SQH  Lines/drains: LIJ (12/3-), L IJ Juanjo (12/6-), [place] (12/3-), 2 abd JPs, feeding jtube  Wounds: midline abd wound w/wound vac

## 2016-12-07 NOTE — PROGRESS NOTE ADULT - SUBJECTIVE AND OBJECTIVE BOX
S: NAEO.  Tolerating trickle feeds.   Attempted CPAP trial in AM but aborted due to tachypnea.    Patient still responding to lasix with significant uop.    Wound vac changed this AM.  Contacted GI about possible intervention today.      O: Vitals: Vital Signs Last 24 Hrs  T(C): 36.6, Max: 37.2 (12-07 @ 00:51)  T(F): 97.8, Max: 98.9 (12-07 @ 00:51)  HR: 72 (58 - 75)  BP: 111/72 (111/72 - 144/90)  BP(mean): 86 (86 - 110)  RR: 13 (11 - 25)  SpO2: 97% (89% - 100%)    CAPILLARY BLOOD GLUCOSE  118 (07 Dec 2016 16:00)  161 (07 Dec 2016 12:00)  172 (07 Dec 2016 05:00)  162 (06 Dec 2016 22:00)      PHYSICAL EXAM:  Neuro: Awake/alert/follows commands, PERRL.  CV: RRR, no m/r/g    Pulm: diminished BS at b/l bases. No w/r/r  Abd: soft, obese, mild distended. Wound vac to midline. CHECO x 2 with mostly whitish (tube-feed-like) output. Jtube in place  : Yu to gravity with yellow uop  Ext: wwp, mild edema x4 ext, no c/c, 2+ DP/PT b/l      I & Os for 24h ending 12-07 @ 07:00  =============================================  IN: 946 ml / OUT: 4475 ml / NET: -3529 ml    I & Os for current day (as of 12-07 @ 17:49)  =============================================  IN: 650 ml / OUT: 3185 ml / NET: -2535 ml      Labs:   Mode: AC/ CMV (Assist Control/ Continuous Mandatory Ventilation), RR (machine): 12, TV (machine): 400, FiO2: 40, PEEP: 5, PIP: 18                          7.3    10.8  )-----------( 102      ( 07 Dec 2016 05:47 )             21.4   07 Dec 2016 14:39    150    |  116    |  41     ----------------------------<  131    3.0     |  23     |  1.23     Ca    7.8        07 Dec 2016 14:39  Phos  3.4       07 Dec 2016 05:46  Mg     2.6       07 Dec 2016 05:46      RADIOLOGY & ADDITIONAL STUDIES:  EXAM:  XR CHEST 1 VIEW PORT ROUTINE                           PROCEDURE DATE:  12/07/2016  A single portable view of the chest is submitted. As on the prior study   performed 12/6/2016 at 6:36 AM there is a feeding tube with its tip in   the expected location of the mid to distal esophagus. There are   persistent bilateral effusions and underlying infiltrates likely vascular   in etiology.    Impression: Findings consistent with congestive failure. The feeding tube   remains elevated in position.

## 2016-12-07 NOTE — PROGRESS NOTE ADULT - ATTENDING COMMENTS
copious urne output- about 3L negative balance for 24 hours-   creatinine improving  BP very good  Continue to maintain negative fluid balance- agree no need for BID lasix while she has brisk diuresis- but still with total body fluid overload- so maintain large negative balance and can usse lasix more often.

## 2016-12-07 NOTE — PROGRESS NOTE ADULT - SUBJECTIVE AND OBJECTIVE BOX
Patient seen and examined by me at bedside.  Voiding well. Mobilizing fluids.    PAST MEDICAL & SURGICAL HISTORY:  Reflux  Obesity  DVT (deep venous thrombosis):   Depression  Diverticulitis  Hypertension  Gastric bypass status for obesity: gastric sleeve, 2012  S/P breast biopsy: , left  S/P colon resection:   S/P knee surgery: repair ,   Umbilical hernia:   S/P appendectomy:   S/P tonsillectomy:     PHYSICAL EXAM:  T(C): 36.4, Max: 37.2 ( @ 00:51)  HR: 64  BP: 136/87 (122/83 - 144/90)  RR: 18  SpO2: 96%  Wt(kg): --  I & Os for 24h ending  @ 07:00  =============================================  IN:    IV PiggyBack: 500 ml    Osmolite: 240 ml    Albumin 25%: 150 ml    fentaNYL  Infusion: 28 ml    sodium chloride 0.9%: 28 ml    Total IN: 946 ml  ---------------------------------------------  OUT:    Ureteral Catheter: 4430 ml    Bulb: 35 ml    Bulb: 10 ml    Total OUT: 4475 ml  ---------------------------------------------  Total NET: -3529 ml    I & Os for current day (as of  @ 08:30)  =============================================  IN:    IV PiggyBack: 100 ml    Total IN: 100 ml  ---------------------------------------------  OUT:    Ureteral Catheter: 100 ml    Total OUT: 100 ml  ---------------------------------------------  Total NET: 0 ml    Weight 79 (03 @ 02:01)  General: Sedated. Arousable,  NAD.  HEENT: dry mucous membranes, no pallor/cyanosis.  Neck: no JVD visible.  Cardiac: S1, S2. RRR.   Respratory: decreased bs at bases, no access muscle use.   Abdomen: soft. obese. nontender. nondistended  Extremities: + LE edema b/l  Access: Left IJ HD cath      DATA:                        7.3<L>  10.8<H> )-----------( 102<L>    ( 07 Dec 2016 05:47 )             21.4<L>        146<H>  |  113<H>  |  44<H>  ----------------------------<  152<H>  Ca:7.5<L> (07 Dec 2016 05:46)  2.5<LL>   |  18<L>  |  1.38<H>      eGFR if Non : 43 <L>  eGFR if : 49 <L>    TPro  4.6 g/dL<L>  /  Alb  1.7 g/dL<L>  /  TBili  1.0 mg/dL  /  DBili  x      /  AST  10 U/L<L>  /  ALT  9 U/L<L>  /  AlkPhos  127 U/L<H>  05 Dec 2016 06:24        Urinalysis Basic - ( 04 Dec 2016 17:33 )  Color: Yellow / Appearance: SL CLOUDY / S.010 / pH: x  Gluc: x / Ketone: NEGATIVE  / Bili: NEGATIVE / Urobili: 0.2 E.U./dL   Blood: x / Protein: NEGATIVE mg/dL / Nitrite: NEGATIVE   Leuk Esterase: NEGATIVE / RBC: > 10 /HPF<!!> / WBC < 5 /HPF   Sq Epi: x / Non Sq Epi: Few /HPF / Bacteria: Present /HPF<!!>                MEDICATIONS  (STANDING):  pantoprazole  Injectable 40milliGRAM(s) IV Push daily  fentaNYL   Infusion 1MICROgram(s)/kG/Hr IV Continuous <Continuous>  insulin lispro (HumaLOG) corrective regimen sliding scale  SubCutaneous Before meals and at bedtime  chlorhexidine 0.12% Liquid 15milliLiter(s) Swish and Spit two times a day  heparin  Injectable 5000Unit(s) SubCutaneous every 8 hours  albumin human 25% IVPB 50milliLiter(s) IV Intermittent every 8 hours  piperacillin/tazobactam IVPB. 2.25Gram(s) IV Intermittent every 6 hours  fluconAZOLE IVPB 200milliGRAM(s) IV Intermittent every 24 hours  hydrocortisone  Injectable 25milliGRAM(s) IV Push every 12 hours  lidocaine 1% Injectable 10milliLiter(s) Local Injection once  heparin  flush 100 Units/mL Injectable 100Unit(s) IV Push every other day    MEDICATIONS  (PRN):

## 2016-12-07 NOTE — PROGRESS NOTE ADULT - ATTENDING COMMENTS
patient is clinically stable. Patient is to continue mechanical ventilation. Renal function is improving and she is on IV Lasix. Maintain a negative fluid balance. Continue current antibiotic. Patient was seen and examined. Around it twice in the patient. The case discussed in details with the surgical team. I reviewed all the data and involving the patient

## 2016-12-08 NOTE — PROGRESS NOTE ADULT - ATTENDING COMMENTS
The patient was seen and examined.  I rounded on the patient twice.  I discussed the case in details with the resident and plan was outlined.  She failed weaning trail again as she became tachypneic.  She is to continue on the antibiotics.  She is toleratin tube fee.  US of the chest to evaluate the right effusion.  She might need bronch if fails wean again

## 2016-12-08 NOTE — PROGRESS NOTE ADULT - ASSESSMENT
56 year old female s/p SIPS procedure c/b anastamotic leak at esophago-jej anastamosis now s/p take back to OR for exploratory laparotomy, abdominal washout, placement of a feeding j-tube in common channel distal to RY anastomosis, and placement of abdominal drains.     Neuro:  fentanyl PRN  CVS: goal MAP>70, normotensive goals.  Albumin 25% q8hrs.   Pulm: Intubated AC   FEN/GI: NPO, J-tube tube feed to goal. Protonix  : stone, post op ATN, lasix prn  Endo: ISS. hydrocortisone taper.   ID: renally dosed Zosyn (12/3--), fluconazole (12/4--),   Heme: SCDs, SQH  Lines/drains: LIJ (12/3-), L IJ Juanjo (12/6-), [place] (12/3-), 2 abd JPs, feeding jtube  Wounds: midline abd wound w/wound vac

## 2016-12-08 NOTE — PROGRESS NOTE ADULT - ATTENDING COMMENTS
Continues to have excellent urine output and creatinine normalized- continue net negative balance- but at this point may even need some IVF so as to not make her too negative too fast- tolerating negative fluid balance hemodynamically for now.

## 2016-12-08 NOTE — PROGRESS NOTE ADULT - SUBJECTIVE AND OBJECTIVE BOX
S: NAEO.  Tolerating advancement of TFs to goal.  Attempted CPAP trial in AM but aborted due to tachypnea.    Patient still responding to lasix with significant uop.    S/p stent placement by GI.      O: Vitals: Vital Signs Last 24 Hrs  T(C): 37.9, Max: 37.9 (12-08 @ 11:00)  T(F): 100.3, Max: 100.3 (12-08 @ 14:00)  HR: 83 (71 - 93)  BP: 142/77 (123/78 - 148/86)  BP(mean): 103 (91 - 109)  RR: 22 (14 - 23)  SpO2: 99% (85% - 100%)    CAPILLARY BLOOD GLUCOSE  150 (08 Dec 2016 18:00)  179 (08 Dec 2016 11:11)  151 (08 Dec 2016 06:00)    PHYSICAL EXAM:  Neuro: Awake/alert/follows commands, PERRL.  CV: RRR, +S1S2, no m/r/g    Pulm: diminished BS at b/l bases. No w/r/r  Abd: soft, obese, mild distended. Wound vac to midline. CHECO x 2 with minimal output. Jtube in place hooked to TFs  : Yu to gravity with yellow uop  Ext: wwp, mild edema x4 ext, Edema RUE>LUE no c/c, 2+ DP/PT b/l      I & Os for 24h ending 12-08 @ 07:00  =============================================  IN: 2246 ml / OUT: 6780 ml / NET: -4534 ml    I & Os for current day (as of 12-08 @ 22:44)  =============================================  IN: 1335 ml / OUT: 3280 ml / NET: -1945 ml      Labs:   ABG - ( 08 Dec 2016 21:01 )  pH: 7.55  /  pCO2: 28    /  pO2: 55    / HCO3: 24    / Base Excess: 1.9   /  SaO2: 89          Mode: AC/ CMV (Assist Control/ Continuous Mandatory Ventilation), RR (machine): 12, TV (machine): 400, FiO2: 50, PEEP: 5, PIP: 23                          7.4    11.2  )-----------( 119      ( 08 Dec 2016 05:49 )             23.0   08 Dec 2016 20:47    147    |  111    |  28     ----------------------------<  153    3.8     |  25     |  0.81     Ca    7.9        08 Dec 2016 20:47  Phos  2.3       08 Dec 2016 05:48  Mg     2.1       08 Dec 2016 20:47      RADIOLOGY & ADDITIONAL STUDIES:  EXAM:  XR CHEST 1 VIEW PORT ROUTINE                           PROCEDURE DATE:  12/07/2016                 INTERPRETATION:  Indication: eval lungs    A single portable view of the chest is submitted. As on the prior study   performed 12/6/2016 at 6:36 AM there is a feeding tube with its tip in   the expected location of the mid to distal esophagus. There are   persistent bilateral effusions and underlying infiltrates likely vascular   in etiology.    Impression: Findings consistent with congestive failure. The feeding tube   remains elevated in position.

## 2016-12-08 NOTE — PROGRESS NOTE ADULT - ASSESSMENT
56 yr old female s/p sipps procedure presented with anastomotic leak with peritoneal fluid collection s/p wash out and CHECO drain placement. Patient had an esophageal fully covered stent placed covering the defect with minimal CHECO output.     - NO NG Tube placement as it may displace the stent   - Extubation as per SICU team   - Once extubated would conduct a Barium Esophagram to confirm defect closure with stent   - Monitor CHECO output for signs of persistent leak   - cont IV ABx  - Repeat EGD in 4-6 weeks with stent removal and assessment of leak.

## 2016-12-08 NOTE — PROGRESS NOTE ADULT - SUBJECTIVE AND OBJECTIVE BOX
Patient seen and examined by me at bedside.  voiding well    PAST MEDICAL & SURGICAL HISTORY:  Reflux  Obesity  DVT (deep venous thrombosis):   Depression  Diverticulitis  Hypertension  Gastric bypass status for obesity: gastric sleeve, 2012  S/P breast biopsy: , left  S/P colon resection:   S/P knee surgery: repair ,   Umbilical hernia:   S/P appendectomy:   S/P tonsillectomy:     PHYSICAL EXAM:  T(C): 37.9, Max: 37.9 ( @ 11:00)  HR: 74  BP: 147/86 (109/72 - 147/86)  RR: 16  SpO2: 95%  Wt(kg): --  I & Os for 24h ending  @ 07:00  =============================================  IN:    Enteral Tube Flush: 750 ml    IV PiggyBack: 600 ml    Osmolite: 320 ml    Oral Fluid: 300 ml    Albumin 25%: 100 ml    fentaNYL  Infusion: 53 ml    sodium chloride 0.9%: 53 ml    IV Solution: 35 ml    propofol Infusion: 35 ml    Total IN: 2246 ml  ---------------------------------------------  OUT:    Ureteral Catheter: 6760 ml    Bulb: 20 ml    Total OUT: 6780 ml  ---------------------------------------------  Total NET: -4534 ml    I & Os for current day (as of  @ 11:16)  =============================================  IN:    Osmolite: 90 ml    Total IN: 90 ml  ---------------------------------------------  OUT:    Ureteral Catheter: 500 ml    Total OUT: 500 ml  ---------------------------------------------  Total NET: -410 ml    Weight 79 ( @ 02:01)  General: AAO x 3,  NAD.  HEENT: moist mucous membranes, no pallor/cyanosis.  Neck: no JVD visible.  Cardiac: S1, S2. RRR. No murmurs   Respratory: CTA b/l, no access muscle use.   Abdomen: soft. nontender. nondistended  Skin: no rashes.  Extremities: no LE edema b/l  Access:       DATA:                        7.4<L>  11.2<H> )-----------( 119<L>    ( 08 Dec 2016 05:49 )             23.0<L>        149<H>  |  114<H>  |  33<H>  ----------------------------<  151<H>  Ca:7.9<L> (08 Dec 2016 05:48)  3.2<L>   |  23     |  0.92       eGFR if Non : 69  eGFR if : 80            Urinalysis Basic - ( 04 Dec 2016 17:33 )  Color: Yellow / Appearance: SL CLOUDY / S.010 / pH: x  Gluc: x / Ketone: NEGATIVE  / Bili: NEGATIVE / Urobili: 0.2 E.U./dL   Blood: x / Protein: NEGATIVE mg/dL / Nitrite: NEGATIVE   Leuk Esterase: NEGATIVE / RBC: > 10 /HPF<!!> / WBC < 5 /HPF   Sq Epi: x / Non Sq Epi: Few /HPF / Bacteria: Present /HPF<!!>      MEDICATIONS  (STANDING):  pantoprazole  Injectable 40milliGRAM(s) IV Push daily  fentaNYL   Infusion 1MICROgram(s)/kG/Hr IV Continuous <Continuous>  insulin lispro (HumaLOG) corrective regimen sliding scale  SubCutaneous Before meals and at bedtime  chlorhexidine 0.12% Liquid 15milliLiter(s) Swish and Spit two times a day  heparin  Injectable 5000Unit(s) SubCutaneous every 8 hours  albumin human 25% IVPB 50milliLiter(s) IV Intermittent every 8 hours  piperacillin/tazobactam IVPB. 2.25Gram(s) IV Intermittent every 6 hours  fluconAZOLE IVPB 200milliGRAM(s) IV Intermittent every 24 hours  lidocaine 1% Injectable 10milliLiter(s) Local Injection once  heparin  flush 100 Units/mL Injectable 100Unit(s) IV Push every other day  propofol Infusion 5MICROgram(s)/kG/Min IV Continuous <Continuous>    MEDICATIONS  (PRN): Patient seen and examined by me at bedside.  voiding well    PAST MEDICAL & SURGICAL HISTORY:  Reflux  Obesity  DVT (deep venous thrombosis):   Depression  Diverticulitis  Hypertension  Gastric bypass status for obesity: gastric sleeve, 2012  S/P breast biopsy: , left  S/P colon resection:   S/P knee surgery: repair ,   Umbilical hernia:   S/P appendectomy:   S/P tonsillectomy:     PHYSICAL EXAM:  T(C): 37.9, Max: 37.9 ( @ 11:00)  HR: 74  BP: 147/86 (109/72 - 147/86)  RR: 16  SpO2: 95%  Wt(kg): --  I & Os for 24h ending  @ 07:00  =============================================  IN:    Enteral Tube Flush: 750 ml    IV PiggyBack: 600 ml    Osmolite: 320 ml    Oral Fluid: 300 ml    Albumin 25%: 100 ml    fentaNYL  Infusion: 53 ml    sodium chloride 0.9%: 53 ml    IV Solution: 35 ml    propofol Infusion: 35 ml    Total IN: 2246 ml  ---------------------------------------------  OUT:    Ureteral Catheter: 6760 ml    Bulb: 20 ml    Total OUT: 6780 ml  ---------------------------------------------  Total NET: -4534 ml    I & Os for current day (as of  @ 11:16)  =============================================  IN:    Osmolite: 90 ml    Total IN: 90 ml  ---------------------------------------------  OUT:    Ureteral Catheter: 500 ml    Total OUT: 500 ml  ---------------------------------------------  Total NET: -410 ml    Weight 79 ( @ 02:01)  General: Sedated. Arousable,  NAD.  HEENT: dry mucous membranes, no pallor/cyanosis.  Neck: no JVD visible.  Cardiac: S1, S2. RRR.   Respratory: decreased bs at bases, no access muscle use.   Abdomen: soft. obese. nontender. nondistended  Extremities: + LE edema b/l (improved significantly)  Access: Left IJ HD cath        DATA:                        7.4<L>  11.2<H> )-----------( 119<L>    ( 08 Dec 2016 05:49 )             23.0<L>        149<H>  |  114<H>  |  33<H>  ----------------------------<  151<H>  Ca:7.9<L> (08 Dec 2016 05:48)  3.2<L>   |  23     |  0.92       eGFR if Non : 69  eGFR if : 80            Urinalysis Basic - ( 04 Dec 2016 17:33 )  Color: Yellow / Appearance: SL CLOUDY / S.010 / pH: x  Gluc: x / Ketone: NEGATIVE  / Bili: NEGATIVE / Urobili: 0.2 E.U./dL   Blood: x / Protein: NEGATIVE mg/dL / Nitrite: NEGATIVE   Leuk Esterase: NEGATIVE / RBC: > 10 /HPF<!!> / WBC < 5 /HPF   Sq Epi: x / Non Sq Epi: Few /HPF / Bacteria: Present /HPF<!!>      MEDICATIONS  (STANDING):  pantoprazole  Injectable 40milliGRAM(s) IV Push daily  fentaNYL   Infusion 1MICROgram(s)/kG/Hr IV Continuous <Continuous>  insulin lispro (HumaLOG) corrective regimen sliding scale  SubCutaneous Before meals and at bedtime  chlorhexidine 0.12% Liquid 15milliLiter(s) Swish and Spit two times a day  heparin  Injectable 5000Unit(s) SubCutaneous every 8 hours  albumin human 25% IVPB 50milliLiter(s) IV Intermittent every 8 hours  piperacillin/tazobactam IVPB. 2.25Gram(s) IV Intermittent every 6 hours  fluconAZOLE IVPB 200milliGRAM(s) IV Intermittent every 24 hours  lidocaine 1% Injectable 10milliLiter(s) Local Injection once  heparin  flush 100 Units/mL Injectable 100Unit(s) IV Push every other day  propofol Infusion 5MICROgram(s)/kG/Min IV Continuous <Continuous>    MEDICATIONS  (PRN):

## 2016-12-08 NOTE — PROGRESS NOTE ADULT - SUBJECTIVE AND OBJECTIVE BOX
On interval followup, the left int jugular HD line site is clean, dry and non-inflamed. T 99 range. Notes and cultures are followed.

## 2016-12-08 NOTE — PROGRESS NOTE ADULT - SUBJECTIVE AND OBJECTIVE BOX
55 yo female pmh htn, diverticulitis/partial colectomy, gastric bypass 2002 persistent leaks, feeding jtubes now 11/28 with aamir en y reconstruction complicated by another anastomotic leak 12/3 ex lap, abd washout and placement of jtube and placement of stent 12/7/16.  Pt intubated.                        7.4    11.2  )-----------( 119      ( 08 Dec 2016 05:49 )             23.0     08 Dec 2016 05:48    149    |  114    |  33     ----------------------------<  151    3.2     |  23     |  0.92     Ca    7.9        08 Dec 2016 05:48  Phos  2.3       08 Dec 2016 05:48  Mg     2.2       08 Dec 2016 05:48      T(C): 37.4, Max: 37.6 (12-08 @ 01:24)  HR: 85 (66 - 85)  BP: 147/86 (109/72 - 147/86)  RR: 20 (13 - 31)  SpO2: 97% (94% - 100%)  Wt(kg): --    gen: NAD, appear comfortable, responds to verbal stimulus, nodds head when asked about pain with palpation of abdomen  abd: distended, decreased bs, soft, mild tenderness to palpation  skin: mmm, pale, no erythema surrounding casey drained or jtube, skin abrasion on right dorsal hand <1cm no erythema or streaking    55 yo female with anastomotic leak s/p aamir en y reconstruction, now with jtube feeding and endoscopic stent placement at anastomosis, no fever over night, episode of tachypnea, arousable, putting out >100 cc urine an hour, no output in casey drains, labs with chronic anemia and hypokalemia being replaced, hypernatremia improved    Plan:  scds, subq hep  c/w abx  jtube feed  monitor casey output and urine output  extubation? communicated all with attending dr tucker

## 2016-12-08 NOTE — PROGRESS NOTE ADULT - PROBLEM SELECTOR PLAN 1
- patient's creatinine improved  - UOP robust  - given patient's hypervolemic volume status (though improving), recommend keeping patient about 3 L net negative (avoid hypotension).  If goal is not met then recommend giving lasix 60 mg IV x 1.    - no indication for RRT as patient mobilizing fluids  - can remove HD catheter

## 2016-12-08 NOTE — PROGRESS NOTE ADULT - SUBJECTIVE AND OBJECTIVE BOX
Pt seen and examined at bedside, patient remains intubated but able to nod to answer questions. Patient denies abd pain. CHECO output was 20 cc by 9 pm and 0 cc overnight.     REVIEW OF SYSTEMS:  Constitutional: No fever, weight loss or fatigue  Cardiovascular: No chest pain, palpitations, dizziness or leg swelling  Gastrointestinal: No abdominal or epigastric pain. No nausea, vomiting or hematemesis; No diarrhea or constipation. No melena or hematochezia.  Skin: No itching, burning, rashes or lesions       MEDICATIONS:  MEDICATIONS  (STANDING):  pantoprazole  Injectable 40milliGRAM(s) IV Push daily  fentaNYL   Infusion 1MICROgram(s)/kG/Hr IV Continuous <Continuous>  insulin lispro (HumaLOG) corrective regimen sliding scale  SubCutaneous Before meals and at bedtime  chlorhexidine 0.12% Liquid 15milliLiter(s) Swish and Spit two times a day  heparin  Injectable 5000Unit(s) SubCutaneous every 8 hours  albumin human 25% IVPB 50milliLiter(s) IV Intermittent every 8 hours  piperacillin/tazobactam IVPB. 2.25Gram(s) IV Intermittent every 6 hours  fluconAZOLE IVPB 200milliGRAM(s) IV Intermittent every 24 hours  lidocaine 1% Injectable 10milliLiter(s) Local Injection once  heparin  flush 100 Units/mL Injectable 100Unit(s) IV Push every other day  propofol Infusion 5MICROgram(s)/kG/Min IV Continuous <Continuous>  potassium phosphate IVPB 21milliMole(s) IV Intermittent once    MEDICATIONS  (PRN):      Allergies    sulfa drugs (Unknown)  sulfamethoxazole (Other)    Intolerances        Vital Signs Last 24 Hrs  T(C): 37.4, Max: 37.6 (12-08 @ 01:24)  T(F): 99.4, Max: 99.6 (12-08 @ 01:24)  HR: 85 (66 - 85)  BP: 147/86 (109/72 - 147/86)  BP(mean): 109 (86 - 109)  RR: 20 (13 - 31)  SpO2: 97% (94% - 100%)  I & Os for 24h ending 12-08 @ 07:00  =============================================  IN: 2246 ml / OUT: 6780 ml / NET: -4534 ml    I & Os for current day (as of 12-08 @ 09:53)  =============================================  IN: 30 ml / OUT: 340 ml / NET: -310 ml      PHYSICAL EXAM:    General: Well developed; well nourished; in no acute distress  HEENT: MMM, conjunctiva and sclera clear  Gastrointestinal: Soft non-tender non-distended; Normal bowel sounds; No hepatosplenomegaly  Skin: Warm and dry. No obvious rash    LABS:  CBC Full  -  ( 08 Dec 2016 05:49 )  WBC Count : 11.2 K/uL  Hemoglobin : 7.4 g/dL  Hematocrit : 23.0 %  Platelet Count - Automated : 119 K/uL  Mean Cell Volume : 73.7 fL  Mean Cell Hemoglobin : 23.7 pg  Mean Cell Hemoglobin Concentration : 32.2 g/dL  Auto Neutrophil # : x  Auto Lymphocyte # : x  Auto Monocyte # : x  Auto Eosinophil # : x  Auto Basophil # : x  Auto Neutrophil % : 88.7 %  Auto Lymphocyte % : 8.1 %  Auto Monocyte % : 2.9 %  Auto Eosinophil % : 0.1 %  Auto Basophil % : 0.2 %    08 Dec 2016 05:48    149    |  114    |  33     ----------------------------<  151    3.2     |  23     |  0.92     Ca    7.9        08 Dec 2016 05:48  Phos  2.3       08 Dec 2016 05:48  Mg     2.2       08 Dec 2016 05:48          RADIOLOGY & ADDITIONAL STUDIES:

## 2016-12-09 NOTE — PROGRESS NOTE ADULT - ASSESSMENT
56 year old female s/p SIPS procedure c/b anastamotic leak at esophago-jej anastamosis now s/p take back to OR for exploratory laparotomy, abdominal washout, placement of a feeding j-tube in common channel distal to RY anastomosis, and placement of abdominal drains.     Neuro:  fentanyl PRN  CVS: goal MAP>70, normotensive goals.  Albumin 25% q8hrs.   Pulm: Intubated AC   FEN/GI: NPO, J-tube tube feed to goal. Protonix  : stone, post op ATN, lasix prn  Endo: ISS. hydrocortisone taper.   ID: renally dosed Zosyn (12/3--), fluconazole (12/4--),   Heme: SCDs, SQH  Lines/drains: LIJ (12/3-), L IJ Juanjo (12/6-), [place] (12/3-), 2 abd JPs, feeding jtube  Wounds: midline abd wound w/wound vac

## 2016-12-09 NOTE — PROGRESS NOTE ADULT - SUBJECTIVE AND OBJECTIVE BOX
Patient seen and examined by me at bedside.  diuresing well    PAST MEDICAL & SURGICAL HISTORY:  Reflux  Obesity  DVT (deep venous thrombosis):   Depression  Diverticulitis  Hypertension  Gastric bypass status for obesity: gastric sleeve, 2012  S/P breast biopsy: , left  S/P colon resection:   S/P knee surgery: repair ,   Umbilical hernia:   S/P appendectomy:   S/P tonsillectomy:     PHYSICAL EXAM:  T(C): 37.8, Max: 37.9 ( @ 11:00)  HR: 82  BP: 108/65 (108/65 - 148/86)  RR: 19  SpO2: 98%  Wt(kg): --  I & Os for 24h ending  @ 07:00  =============================================  IN:    Osmolite: 1115 ml    Enteral Tube Flush: 500 ml    IV PiggyBack: 200 ml    Albumin 25%: 50 ml    Total IN: 1865 ml  ---------------------------------------------  OUT:    Ureteral Catheter: 4750 ml    VAC (Vacuum Assisted Closure) System: 25 ml    Bulb: 10 ml    Total OUT: 4785 ml  ---------------------------------------------  Total NET: -2920 ml    I & Os for current day (as of  @ 09:45)  =============================================  IN:    Solution: 130 ml    Osmolite: 105 ml    Total IN: 235 ml  ---------------------------------------------  OUT:    Ureteral Catheter: 165 ml    Total OUT: 165 ml  ---------------------------------------------  Total NET: 70 ml    Weight 79 ( @ 02:01)  General: Sedated. Arousable,  NAD.  HEENT: dry mucous membranes, no pallor/cyanosis.  Neck: no JVD visible.  Cardiac: S1, S2. RRR.   Respratory: decreased bs at bases, no access muscle use.   Abdomen: soft. obese. nontender. nondistended  Extremities: + trace dependent edema (improved significantly)    DATA:                        7.3<L>  12.1<H> )-----------( 142<L>    ( 09 Dec 2016 05:39 )             23.4<L>        148<H>  |  111<H>  |  25<H>  ----------------------------<  185<H>  Ca:7.4<L> (09 Dec 2016 05:39)  3.9     |  29     |  0.67       eGFR if Non : 98  eGFR if : 114            Urinalysis Basic - ( 04 Dec 2016 17:33 )  Color: Yellow / Appearance: SL CLOUDY / S.010 / pH: x  Gluc: x / Ketone: NEGATIVE  / Bili: NEGATIVE / Urobili: 0.2 E.U./dL   Blood: x / Protein: NEGATIVE mg/dL / Nitrite: NEGATIVE   Leuk Esterase: NEGATIVE / RBC: > 10 /HPF<!!> / WBC < 5 /HPF   Sq Epi: x / Non Sq Epi: Few /HPF / Bacteria: Present /HPF<!!>                MEDICATIONS  (STANDING):  pantoprazole  Injectable 40milliGRAM(s) IV Push daily  insulin lispro (HumaLOG) corrective regimen sliding scale  SubCutaneous Before meals and at bedtime  chlorhexidine 0.12% Liquid 15milliLiter(s) Swish and Spit two times a day  heparin  Injectable 5000Unit(s) SubCutaneous every 8 hours  albumin human 25% IVPB 50milliLiter(s) IV Intermittent every 8 hours  fluconAZOLE IVPB 200milliGRAM(s) IV Intermittent every 24 hours  lidocaine 1% Injectable 10milliLiter(s) Local Injection once  heparin  flush 100 Units/mL Injectable 100Unit(s) IV Push every other day  piperacillin/tazobactam IVPB. 3.375Gram(s) IV Intermittent every 6 hours  ALBUTerol/ipratropium for Nebulization 3milliLiter(s) Nebulizer every 6 hours    MEDICATIONS  (PRN):

## 2016-12-09 NOTE — PROGRESS NOTE ADULT - PROBLEM SELECTOR PLAN 1
- patient's creatinine improved  - UOP robust  - patient is approximately 12 L net negative since 12/6/16.  - volume status improving.  Would recommend keeping patient about 2 L net negative today. Can consider 40 mg IV lasix if needed to reach goal.    - avoid hypotensive episodes (Keep SBP >110)

## 2016-12-09 NOTE — PROGRESS NOTE ADULT - ATTENDING COMMENTS
prior ASA- now creatinine better- continues to have polyuria and now with electrolyte pertubations-   Supplement PO4, K and give Free water for rising sodium

## 2016-12-09 NOTE — PROGRESS NOTE ADULT - ASSESSMENT
56 yr old female with a PMHx of obesity, DVT, depression, s/p SIPPS procedure presented with anastomotic leak s/p Esophageal covered stent placement over the defect.    - extubation as per SICU team   - Monitor CHECO output if output increases may consider repeat EGD with stent reposition. Current output 5 cc in 24 hours.   - Once extubated and passes speech and swallow consider barium esophagram to confirm stent is covering defect  - Broad spectrum Abx  - GI will follow

## 2016-12-09 NOTE — PROGRESS NOTE ADULT - SUBJECTIVE AND OBJECTIVE BOX
S: NAEO.  Patient reports    O: Vitals: Vital Signs Last 24 Hrs  T(C): 37.4, Max: 37.8 (12-09 @ 06:00)  T(F): 99.4, Max: 100.1 (12-09 @ 06:00)  HR: 80 (77 - 92)  BP: 112/67 (108/65 - 172/92)  BP(mean): 87 (81 - 112)  RR: 17 (12 - 24)  SpO2: 100% (88% - 100%)    CAPILLARY BLOOD GLUCOSE  146 (09 Dec 2016 22:00)  120 (09 Dec 2016 16:00)  141 (09 Dec 2016 11:00)      PHYSICAL EXAM:  Neuro: A&Ox3, NAD, grossly neuro intact  CV: RRR, no MRGs  Pulm: CTAB, no wheezes, rales ronchi  Abd: BS (+), Soft, NT, ND  Ext: No edema peripherally or centrally  Vasc: 2+ pulses - radial and PT      I & Os for 24h ending 12-09 @ 07:00  =============================================  IN: 1865 ml / OUT: 4785 ml / NET: -2920 ml    I & Os for current day (as of 12-09 @ 23:23)  =============================================  IN: 1661 ml / OUT: 2160 ml / NET: -499 ml      Labs:   ABG - ( 08 Dec 2016 21:01 )  pH: 7.55  /  pCO2: 28    /  pO2: 55    / HCO3: 24    / Base Excess: 1.9   /  SaO2: 89              Mode: CPAP with PS, FiO2: 50, PEEP: 6, PS: 10, PIP: 16                          7.3    12.1  )-----------( 142      ( 09 Dec 2016 05:39 )             23.4   09 Dec 2016 05:39    148    |  111    |  25     ----------------------------<  185    3.9     |  29     |  0.67     Ca    7.4        09 Dec 2016 05:39  Phos  1.7       09 Dec 2016 05:39  Mg     2.2       09 Dec 2016 05:39          RADIOLOGY & ADDITIONAL STUDIES: S: NAEO.  Patient underwent bronch this AM and tolerated CPAP trial much easier following procedure.    Patient still responding to lasix with significant uop.    Denies cp/abd pain/sob/n/v/f/c.       O: Vitals: Vital Signs Last 24 Hrs  T(C): 37.4, Max: 37.8 (12-09 @ 06:00)  T(F): 99.4, Max: 100.1 (12-09 @ 06:00)  HR: 80 (77 - 92)  BP: 112/67 (108/65 - 172/92)  BP(mean): 87 (81 - 112)  RR: 17 (12 - 24)  SpO2: 100% (88% - 100%)    CAPILLARY BLOOD GLUCOSE  146 (09 Dec 2016 22:00)  120 (09 Dec 2016 16:00)  141 (09 Dec 2016 11:00)      PHYSICAL EXAM:  Neuro: Awake/alert/follows commands, PERRL.  CV: RRR, +S1S2, no m/r/g    Pulm: diminished BS at b/l bases. No w/r/r  Abd: soft, obese, mild distended. Wound vac to midline. CHECO x 2 with minimal output. Jtube in place hooked to TFs  : Yu to gravity with yellow uop  Ext: wwp, mild edema x4 ext, Edema RUE>LUE no c/c, 2+ DP/PT b/l    I & Os for 24h ending 12-09 @ 07:00  =============================================  IN: 1865 ml / OUT: 4785 ml / NET: -2920 ml    I & Os for current day (as of 12-09 @ 23:23)  =============================================  IN: 1661 ml / OUT: 2160 ml / NET: -499 ml    Labs:   ABG - ( 08 Dec 2016 21:01 )  pH: 7.55  /  pCO2: 28    /  pO2: 55    / HCO3: 24    / Base Excess: 1.9   /  SaO2: 89          Mode: CPAP with PS, FiO2: 50, PEEP: 6, PS: 10, PIP: 16                          7.3    12.1  )-----------( 142      ( 09 Dec 2016 05:39 )             23.4   09 Dec 2016 05:39    148    |  111    |  25     ----------------------------<  185    3.9     |  29     |  0.67     Ca    7.4        09 Dec 2016 05:39  Phos  1.7       09 Dec 2016 05:39  Mg     2.2       09 Dec 2016 05:39    RADIOLOGY & ADDITIONAL STUDIES:    EXAM:  XR CHEST 1 VIEW PORT ROUTINE                           PROCEDURE DATE:  12/09/2016                 INTERPRETATION:    CLINICAL INDICATION: Evaluate lungs    EXAM: Portable AP radiograph of the chest.    COMPARISON: 12/8/2016    FINDINGS:  Exam is limited by PEARSON rotation. There is obscuration of the left   hemidiaphragm which may be secondary to left pleural effusion versus   lower lobe atelectasis/pneumonia. The right lung is grossly clear   although small right pleural effusion is difficult to exclude. No   evidence of pneumothorax. Endotracheal tube an left internal jugular   central catheters are unchanged in position. Distal esophageal to   intestinal stent graft is again seen.    IMPRESSION:   Given the rotational limitation of this film, there there is no   significant change when compared to 12/8/2016.    · Additional Procedure Details	Indication: Respiratory failure, Hypoxemia  Pre-op Dx: Respiratory failure, Hypoxemia   Preop medication:  Xray Findings: bibasilar infiltrate  Findings: Bronchoscope inserted through ETT. Airway evaluation revealed Sharp Chrissie. JARVIS and LLL evaluation revealed that reveled no endobronchial lesion and mucus plug. RUL, RML, RLL revealed scanty whitish sputum that was suctioned out. ETT noted to be in good position. Bronchoscope then withdrawn from ETT. No bleeding noted. 5 cc propofol used for the proceedure.  Specimens: Sent for microbiology.

## 2016-12-09 NOTE — PROGRESS NOTE ADULT - SUBJECTIVE AND OBJECTIVE BOX
Pt seen and examined at bedside, pt remains intubated but responds to questions. Pt noted abd pain over surgical incision. Pt in negative balance for the past 3 days. CHECO out put 5 cc or less.     REVIEW OF SYSTEMS:  Constitutional: No fever, weight loss or fatigue  Cardiovascular: No chest pain, palpitations, dizziness or leg swelling  Gastrointestinal: + abdominal pain. No nausea, vomiting or hematemesis; No diarrhea or constipation. No melena or hematochezia.  Skin: No itching, burning, rashes or lesions       MEDICATIONS:  MEDICATIONS  (STANDING):  pantoprazole  Injectable 40milliGRAM(s) IV Push daily  insulin lispro (HumaLOG) corrective regimen sliding scale  SubCutaneous Before meals and at bedtime  chlorhexidine 0.12% Liquid 15milliLiter(s) Swish and Spit two times a day  heparin  Injectable 5000Unit(s) SubCutaneous every 8 hours  albumin human 25% IVPB 50milliLiter(s) IV Intermittent every 8 hours  fluconAZOLE IVPB 200milliGRAM(s) IV Intermittent every 24 hours  lidocaine 1% Injectable 10milliLiter(s) Local Injection once  heparin  flush 100 Units/mL Injectable 100Unit(s) IV Push every other day  piperacillin/tazobactam IVPB. 3.375Gram(s) IV Intermittent every 6 hours  ALBUTerol/ipratropium for Nebulization 3milliLiter(s) Nebulizer every 6 hours  propofol Injectable 5milliGRAM(s) IV Push once    MEDICATIONS  (PRN):      Allergies    sulfa drugs (Unknown)  sulfamethoxazole (Other)    Intolerances        Vital Signs Last 24 Hrs  T(C): 37.8, Max: 37.9 (12-08 @ 14:00)  T(F): 100.1, Max: 100.3 (12-08 @ 14:00)  HR: 86 (74 - 93)  BP: 119/72 (108/65 - 172/92)  BP(mean): 81 (81 - 108)  RR: 20 (16 - 24)  SpO2: 99% (85% - 99%)  I & Os for 24h ending 12-09 @ 07:00  =============================================  IN: 1865 ml / OUT: 4785 ml / NET: -2920 ml    I & Os for current day (as of 12-09 @ 11:00)  =============================================  IN: 235 ml / OUT: 165 ml / NET: 70 ml  CHECO output 5 cc    PHYSICAL EXAM:    General: Well developed; well nourished; in no acute distress  HEENT: MMM, conjunctiva and sclera clear  Gastrointestinal:BS + tenderness over the incision, no guarding or rebound no hepatomegaly   Skin: Warm and dry. No obvious rash    LABS:  CBC Full  -  ( 09 Dec 2016 05:39 )  WBC Count : 12.1 K/uL  Hemoglobin : 7.3 g/dL  Hematocrit : 23.4 %  Platelet Count - Automated : 142 K/uL  Mean Cell Volume : 76.2 fL  Mean Cell Hemoglobin : 23.8 pg  Mean Cell Hemoglobin Concentration : 31.2 g/dL  Auto Neutrophil # : x  Auto Lymphocyte # : x  Auto Monocyte # : x  Auto Eosinophil # : x  Auto Basophil # : x  Auto Neutrophil % : 71.0 %  Auto Lymphocyte % : 9.0 %  Auto Monocyte % : 4.0 %  Auto Eosinophil % : 2.0 %  Auto Basophil % : x    09 Dec 2016 05:39    148    |  111    |  25     ----------------------------<  185    3.9     |  29     |  0.67     Ca    7.4        09 Dec 2016 05:39  Phos  1.7       09 Dec 2016 05:39  Mg     2.2       09 Dec 2016 05:39                RADIOLOGY & ADDITIONAL STUDIES:  CXR:  Left effusion. Retrocardiac opacity may secondary to effusion,   atelectasis or pneumonia. Interval improvement in right effusion.

## 2016-12-09 NOTE — PROGRESS NOTE ADULT - ATTENDING COMMENTS
The patient seen examined.  I rounded on the patient twice.  I reviewed the case with the surgical resident .  I reviewed labs meds and CXR.  I discussed the case with surgery.  The patient is stable.  Continue wean from the vent if tolerated.  Bronchoscopy was performed and revealed mucous plugging in the LLL.  Continue antibiotics and tube feed.

## 2016-12-10 NOTE — PROGRESS NOTE ADULT - SUBJECTIVE AND OBJECTIVE BOX
Patient seen and examined at bedside.   Patient remains intubated and sedated at present   Objective events reviewed at present  Flowsheets reviewed  Patient has robust urine output 3880cc; but is merely net negative by 850cc approximately.   Hemodynamics are appropraite without hypotension noted     pantoprazole  Injectable 40milliGRAM(s) daily  insulin lispro (HumaLOG) corrective regimen sliding scale  Before meals and at bedtime  chlorhexidine 0.12% Liquid 15milliLiter(s) two times a day  heparin  Injectable 5000Unit(s) every 8 hours  albumin human 25% IVPB 50milliLiter(s) every 8 hours  fluconAZOLE IVPB 200milliGRAM(s) every 24 hours  lidocaine 1% Injectable 10milliLiter(s) once  heparin  flush 100 Units/mL Injectable 100Unit(s) every other day  piperacillin/tazobactam IVPB. 3.375Gram(s) every 6 hours  ALBUTerol/ipratropium for Nebulization 3milliLiter(s) every 6 hours  acetaminophen    Suspension. 650milliGRAM(s) every 6 hours PRN      Allergies    sulfa drugs (Unknown)  sulfamethoxazole (Other)    Intolerances        T(C): , Max: 37.8 (12-09 @ 10:00)  T(F): , Max: 100.1 (12-09 @ 10:00)  HR: 78  BP: 129/74  BP(mean): 90  RR: 26  SpO2: 99%  Wt(kg): --  I & Os for 24h ending 12-10 @ 07:00  =============================================  IN:    Osmolite: 1249 ml    Enteral Tube Flush: 750 ml    Solution: 380 ml    IV PiggyBack: 300 ml    Oral Fluid: 250 ml    Albumin 25%: 100 ml    Total IN: 3029 ml  ---------------------------------------------  OUT:    Ureteral Catheter: 3880 ml    Total OUT: 3880 ml  ---------------------------------------------  Total NET: -851 ml    I & Os for current day (as of 12-10 @ 09:47)  =============================================  IN:    Osmolite: 52 ml    Total IN: 52 ml  ---------------------------------------------  OUT:    Total OUT: 0 ml  ---------------------------------------------  Total NET: 52 ml          LABS:                        7.1    11.0  )-----------( 178      ( 10 Dec 2016 05:52 )             22.7     10 Dec 2016 05:52    147    |  108    |  19     ----------------------------<  157    3.4     |  31     |  0.64     Ca    7.4        10 Dec 2016 05:52  Phos  2.1       10 Dec 2016 05:52  Mg     2.2       10 Dec 2016 05:52                  RADIOLOGY & ADDITIONAL STUDIES:

## 2016-12-10 NOTE — BRIEF OPERATIVE NOTE - OPERATION/FINDINGS
Bronchoscope inserted through ETT. Airway evaluation revealed Sharp Chrissie. JARVIS and LLL evaluation revealed normal anatomy. RUL, RML, RLL revealed mucous plug in R main stem bronchus. ETT moved to a good position. Bronchoscope then withdrawn from ETT. Minimal bleeding noted

## 2016-12-10 NOTE — PROGRESS NOTE ADULT - ASSESSMENT
56 yr old female with a PMHx of obesity, DVT, depression, s/p SIPPS procedure presented with anastomotic leak s/p Esophageal covered stent placement over the defect.    No output in 24 hours from CHECO indicating adequate placement of stent. Tolerating feeds, but no BM yet.  -add miralax regimen  - extubation as per SICU team when tolerates vent wean  -  Once extubated and passes speech and swallow consider barium esophagram to confirm stent is covering defect  - cont Broad spectrum Abx  - GI will follow

## 2016-12-10 NOTE — PROGRESS NOTE ADULT - SUBJECTIVE AND OBJECTIVE BOX
Pt seen and examined. Still intubated and sedated because not tolerating wean off of vent.  Tolerating feeds through CHECO; no output from two other JPs since post procedure.  Still no bm        MEDICATIONS:  MEDICATIONS  (STANDING):  pantoprazole  Injectable 40milliGRAM(s) IV Push daily  insulin lispro (HumaLOG) corrective regimen sliding scale  SubCutaneous Before meals and at bedtime  chlorhexidine 0.12% Liquid 15milliLiter(s) Swish and Spit two times a day  heparin  Injectable 5000Unit(s) SubCutaneous every 8 hours  albumin human 25% IVPB 50milliLiter(s) IV Intermittent every 8 hours  fluconAZOLE IVPB 200milliGRAM(s) IV Intermittent every 24 hours  lidocaine 1% Injectable 10milliLiter(s) Local Injection once  heparin  flush 100 Units/mL Injectable 100Unit(s) IV Push every other day  piperacillin/tazobactam IVPB. 3.375Gram(s) IV Intermittent every 6 hours  ALBUTerol/ipratropium for Nebulization 3milliLiter(s) Nebulizer every 6 hours    MEDICATIONS  (PRN):  acetaminophen    Suspension. 650milliGRAM(s) Oral every 6 hours PRN Mild Pain (1 - 3)      Allergies    sulfa drugs (Unknown)  sulfamethoxazole (Other)    Intolerances        Vital Signs Last 24 Hrs  T(C): 37.4, Max: 37.8 (12-09 @ 10:00)  T(F): 99.3, Max: 100.1 (12-09 @ 10:00)  HR: 82 (11 - 92)  BP: 142/85 (114/71 - 172/92)  BP(mean): 100 (100 - 112)  RR: 16 (12 - 21)  SpO2: 99% (96% - 100%)  I & Os for 24h ending 12-10 @ 07:00  =============================================  IN: 3029 ml / OUT: 3880 ml / NET: -851 ml    I & Os for current day (as of 12-10 @ 09:38)  =============================================  IN: 52 ml / OUT: 0 ml / NET: 52 ml      PHYSICAL EXAM:  General: Well developed; well nourished; in no acute distress  HEENT: MMM, conjunctiva and sclera clear  Gastrointestinal: Soft with well dressed surgical wounds and wound vac; non-tender non-distended; Normal bowel sounds; No hepatosplenomegaly. No rebound or guarding  JPs have scant serosanguinaous fluid, but no new output  Skin: Warm and dry. No obvious rash    LABS:  CBC Full  -  ( 10 Dec 2016 05:52 )  WBC Count : 11.0 K/uL  Hemoglobin : 7.1 g/dL  Hematocrit : 22.7 %  Platelet Count - Automated : 178 K/uL  Mean Cell Volume : 78.8 fL  Mean Cell Hemoglobin : 24.7 pg  Mean Cell Hemoglobin Concentration : 31.3 g/dL  Auto Neutrophil # : x  Auto Lymphocyte # : x  Auto Monocyte # : x  Auto Eosinophil # : x  Auto Basophil # : x  Auto Neutrophil % : x  Auto Lymphocyte % : x  Auto Monocyte % : x  Auto Eosinophil % : x  Auto Basophil % : x    10 Dec 2016 05:52    147    |  108    |  19     ----------------------------<  157    3.4     |  31     |  0.64     Ca    7.4        10 Dec 2016 05:52  Phos  2.1       10 Dec 2016 05:52  Mg     2.2       10 Dec 2016 05:52                        RADIOLOGY & ADDITIONAL STUDIES:

## 2016-12-10 NOTE — PROGRESS NOTE ADULT - SUBJECTIVE AND OBJECTIVE BOX
S: Patient seen and examined at bedside. Sleepy but awakes to verbal stimuli. Denies pain. For repeat bronch today.     Vitals: Vital Signs Last 24 Hrs  T(C): 38.1, Max: 38.1 (12-10 @ 17:00)  T(F): 100.5, Max: 100.5 (12-10 @ 17:00)  HR: 91 (11 - 116)  BP: 76/53 (76/53 - 157/83)  BP(mean): 59 (59 - 113)  RR: 19 (15 - 27)  SpO2: 100% (93% - 100%)    CAPILLARY BLOOD GLUCOSE  130 (10 Dec 2016 16:00)  135 (10 Dec 2016 12:00)  160 (10 Dec 2016 06:00)  146 (09 Dec 2016 22:00)      Exam:  Neuro: Sleepy but arousable and following commands, NAD  CV: RRR, S1 + S2  Pulm: CTAB, no crackles, intubated on AC  Abdominal: soft, ND, CHECO x 2 in place with s/s fluid, midline vac functioning normally  Ext: warm, no edema, +SCDs  Vasc: no cyanosis/erythema, 2+ radial b/l    Labs:    10 Dec 2016 05:52    147    |  108    |  19     ----------------------------<  157    3.4     |  31     |  0.64     Ca    7.4        10 Dec 2016 05:52  Phos  2.1       10 Dec 2016 05:52  Mg     2.2       10 Dec 2016 05:52                            7.1    11.0  )-----------( 178      ( 10 Dec 2016 05:52 )             22.7

## 2016-12-10 NOTE — PROGRESS NOTE ADULT - PROBLEM SELECTOR PLAN 1
- patient's creatinine improved to baseline   - UOP robust  - volume status improving though still has evidence of pulmonary congestion.   - Please limit obligate inputs if possible   - Would want at least net 1.5L negative; can keep the albumin ongoing but would supplement that with IV Lasix 40mg BID at present.   - avoid hypotensive episodes; keep MAP > 65; if MAP falls under this goal, hold subsequent Lasix dosage

## 2016-12-10 NOTE — PROGRESS NOTE ADULT - ATTENDING COMMENTS
ARF resolved   good uo  aim for negative I/O 1-2 L /d  replete K   should be able to dc albumin unless hypotension recurs

## 2016-12-11 NOTE — PROGRESS NOTE ADULT - SUBJECTIVE AND OBJECTIVE BOX
Patient seen and examined at bedside.   Yu catheter removed  Patient remains intubated  But no longer on vasopressors at present based on review of the active drips at bedside.   Labs noted     Pending receipt of 1U pRBC    pantoprazole  Injectable 40milliGRAM(s) daily  insulin lispro (HumaLOG) corrective regimen sliding scale  Before meals and at bedtime  chlorhexidine 0.12% Liquid 15milliLiter(s) two times a day  heparin  Injectable 5000Unit(s) every 8 hours  albumin human 25% IVPB 50milliLiter(s) every 8 hours  fluconAZOLE IVPB 200milliGRAM(s) every 24 hours  lidocaine 1% Injectable 10milliLiter(s) once  heparin  flush 100 Units/mL Injectable 100Unit(s) every other day  piperacillin/tazobactam IVPB. 3.375Gram(s) every 6 hours  ALBUTerol/ipratropium for Nebulization 3milliLiter(s) every 6 hours  acetaminophen    Suspension. 650milliGRAM(s) every 6 hours PRN  potassium chloride  20 mEq/100 mL IVPB 20milliEquivalent(s) once  norepinephrine Infusion 0.02MICROgram(s)/kG/Min <Continuous>  HYDROmorphone  Injectable 0.5milliGRAM(s) every 4 hours PRN      Allergies    sulfa drugs (Unknown)  sulfamethoxazole (Other)    Intolerances        T(C): , Max: 38.4 (12-10 @ 21:25)  T(F): , Max: 101.2 (12-10 @ 21:25)  HR: 84  BP: 76/53  BP(mean): 59  RR: 16  SpO2: 100%  Wt(kg): --    I & Os for current day (as of 12-11 @ 08:59)  =============================================  IN:    Osmolite: 260 ml    Total IN: 260 ml  ---------------------------------------------  OUT:    Ureteral Catheter: 300 ml    Total OUT: 300 ml  ---------------------------------------------  Total NET: -40 ml          LABS:                        6.4    10.7  )-----------( 229      ( 11 Dec 2016 06:13 )             21.6     11 Dec 2016 06:13    148    |  110    |  18     ----------------------------<  144    3.6     |  27     |  0.59     Ca    7.6        11 Dec 2016 06:13  Phos  2.0       11 Dec 2016 06:13  Mg     2.3       11 Dec 2016 06:13                  RADIOLOGY & ADDITIONAL STUDIES:

## 2016-12-11 NOTE — PROGRESS NOTE ADULT - PROBLEM SELECTOR PLAN 1
Neuro:  fentanyl PRN, tylenol prn  CVS: goal MAP>70, normotensive goals.  Albumin 25% q8hrs. levophed gtt   Pulm: Intubated AC, peridex, duonebs  FEN/GI: NPO, J-tube tube feed to goal. Protonix  : stone, post op ATN, lasix prn  Endo: ISS. hydrocortisone taper.   ID: renally dosed Zosyn (12/3--), fluconazole (12/4--),   Heme: SCDs, SQH  Lines/drains: LIJ (12/3-), L IJ Juanjo (12/6-), [place] (12/3-), 2 abd JPs, feeding jtube  Wounds: midline abd wound w/wound vac  * CT CAP to evaluate for further source of fever despite antibiotics.

## 2016-12-11 NOTE — PROGRESS NOTE ADULT - ATTENDING COMMENTS
Patient remains with temps to 101 and increased respiratory rate with poor cough on cpap/ps. Bulb drain with didi pus and cxr with atelectasis and effusions at bases. I suspect ongoing intraabdominal process is contributing to resp failure. Continue abx and send drain culture. F/U with primary surgery team need for further imaging.

## 2016-12-11 NOTE — PROGRESS NOTE ADULT - PROBLEM SELECTOR PLAN 1
- patient's creatinine improved to baseline   - UOP only documented 300cc but patient without Yu catheter   - volume status improving though still has evidence of pulmonary congestion.   - Please limit obligate inputs if possible   - avoid hypotensive episodes; keep MAP > 65

## 2016-12-11 NOTE — PROGRESS NOTE ADULT - SUBJECTIVE AND OBJECTIVE BOX
Pt seen and examined. Still intubated, but reports mild abdominal pain. no n/v  has been constipated      MEDICATIONS:  MEDICATIONS  (STANDING):  pantoprazole  Injectable 40milliGRAM(s) IV Push daily  insulin lispro (HumaLOG) corrective regimen sliding scale  SubCutaneous Before meals and at bedtime  chlorhexidine 0.12% Liquid 15milliLiter(s) Swish and Spit two times a day  heparin  Injectable 5000Unit(s) SubCutaneous every 8 hours  albumin human 25% IVPB 50milliLiter(s) IV Intermittent every 8 hours  fluconAZOLE IVPB 200milliGRAM(s) IV Intermittent every 24 hours  lidocaine 1% Injectable 10milliLiter(s) Local Injection once  heparin  flush 100 Units/mL Injectable 100Unit(s) IV Push every other day  piperacillin/tazobactam IVPB. 3.375Gram(s) IV Intermittent every 6 hours  ALBUTerol/ipratropium for Nebulization 3milliLiter(s) Nebulizer every 6 hours  norepinephrine Infusion 0.02MICROgram(s)/kG/Min IV Continuous <Continuous>    MEDICATIONS  (PRN):  acetaminophen    Suspension. 650milliGRAM(s) Oral every 6 hours PRN Mild Pain (1 - 3)  HYDROmorphone  Injectable 0.5milliGRAM(s) IV Push every 4 hours PRN Severe Pain (7 - 10)      Allergies    sulfa drugs (Unknown)  sulfamethoxazole (Other)    Intolerances        Vital Signs Last 24 Hrs  T(C): 37.4, Max: 38.4 (12-10 @ 21:25)  T(F): 99.3, Max: 101.2 (12-10 @ 21:25)  HR: 84 (79 - 116)  BP: 76/53 (76/53 - 157/83)  BP(mean): 59 (59 - 113)  RR: 12 (12 - 27)  SpO2: 100% (93% - 100%)    I & Os for current day (as of 12-11 @ 09:23)  =============================================  IN: 260 ml / OUT: 300 ml / NET: -40 ml      PHYSICAL EXAM:    General: intubated   HEENT: MMM, conjunctiva and sclera clear  Gastrointestinal: Soft mild diffuse tenderness slightly distended; slow but present bowel sounds; No hepatosplenomegaly. No rebound or guarding  clean abdominal wounds  Skin: Warm and dry. No obvious rash    LABS:  CBC Full  -  ( 11 Dec 2016 06:13 )  WBC Count : 10.7 K/uL  Hemoglobin : 6.4 g/dL  Hematocrit : 21.6 %  Platelet Count - Automated : 229 K/uL  Mean Cell Volume : 81.5 fL  Mean Cell Hemoglobin : 24.2 pg  Mean Cell Hemoglobin Concentration : 29.6 g/dL  Auto Neutrophil # : x  Auto Lymphocyte # : x  Auto Monocyte # : x  Auto Eosinophil # : x  Auto Basophil # : x  Auto Neutrophil % : 88.7 %  Auto Lymphocyte % : 6.6 %  Auto Monocyte % : 3.0 %  Auto Eosinophil % : 1.5 %  Auto Basophil % : 0.2 %    11 Dec 2016 06:13    148    |  110    |  18     ----------------------------<  144    3.6     |  27     |  0.59     Ca    7.6        11 Dec 2016 06:13  Phos  2.0       11 Dec 2016 06:13  Mg     2.3       11 Dec 2016 06:13

## 2016-12-11 NOTE — PROGRESS NOTE ADULT - SUBJECTIVE AND OBJECTIVE BOX
On followup, the left int jugular HD catheter site is clean, dry, non-inflamed and non-tender. T  range. Notes and cultures are followed.

## 2016-12-11 NOTE — PROGRESS NOTE ADULT - SUBJECTIVE AND OBJECTIVE BOX
S: Febrile x 2. Levophed weaned.     O:     Vital Signs Last 24 Hrs  T(C): 36.5, Max: 38.4 (12-10 @ 21:25)  T(F): 97.7, Max: 101.2 (12-10 @ 21:25)  HR: 84 (80 - 116)  BP: 76/53 (76/53 - 157/83)  BP(mean): 59 (59 - 113)  RR: 12 (12 - 27)  SpO2: 100% (93% - 100%)  I&O's Summary      Gen - NAD  Neuro - A&OX3  CV - RRR  Resp - Coarse Upper Airway Sounds  GI - Soft, Vilma TTP, ND, Incision VAC, CHECO L - SS, CHECO R - Purulent   - None  Ext - Mild BLE Edema                          6.4    10.7  )-----------( 229      ( 11 Dec 2016 06:13 )             21.6   11 Dec 2016 06:13    148    |  110    |  18     ----------------------------<  144    3.6     |  27     |  0.59     Ca    7.6        11 Dec 2016 06:13  Phos  2.0       11 Dec 2016 06:13  Mg     2.3       11 Dec 2016 06:13

## 2016-12-11 NOTE — PROGRESS NOTE ADULT - ATTENDING COMMENTS
ARF resolved  hypernatremic-- agree with free water 250 cc q4  follow lytes  don't think need IV albumin anymore ARF resolved  hypernatremic-- agree with free water 250 cc q4  follow lytes  don't think need IV albumin anymore- but consider PRBC

## 2016-12-11 NOTE — PROGRESS NOTE ADULT - ASSESSMENT
56 yr old female with a PMHx of obesity, DVT, depression, s/p SIPPS procedure presented with anastomotic leak s/p Esophageal covered stent placement over the defect.    No output in 48 hours from CHECO indicating adequate placement of stent. Tolerating feeds, but still no BM.  -add miralax regimen  - extubation as per SICU team when tolerates vent wean  - Once extubated and passes speech and swallow consider barium esophagram to confirm stent is covering defect  - cont Broad spectrum Abx  - GI will follow

## 2016-12-11 NOTE — PROGRESS NOTE ADULT - ASSESSMENT
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

## 2016-12-12 NOTE — PROGRESS NOTE ADULT - SUBJECTIVE AND OBJECTIVE BOX
Patient seen and examined at bedside.     Remains intubated at present. Cannot obtain history from patient  Objective data reviewed.   Received CTAP with IV contrast yesterday    Objective data reviewed  Since Yu was removed, patient has not had accurate outputs removed.       pantoprazole  Injectable 40milliGRAM(s) daily  insulin lispro (HumaLOG) corrective regimen sliding scale  Before meals and at bedtime  chlorhexidine 0.12% Liquid 15milliLiter(s) two times a day  heparin  Injectable 5000Unit(s) every 8 hours  fluconAZOLE IVPB 200milliGRAM(s) every 24 hours  lidocaine 1% Injectable 10milliLiter(s) once  heparin  flush 100 Units/mL Injectable 100Unit(s) every other day  piperacillin/tazobactam IVPB. 3.375Gram(s) every 6 hours  ALBUTerol/ipratropium for Nebulization 3milliLiter(s) every 6 hours  acetaminophen    Suspension. 650milliGRAM(s) every 6 hours PRN  norepinephrine Infusion 0.02MICROgram(s)/kG/Min <Continuous>  HYDROmorphone  Injectable 0.5milliGRAM(s) every 4 hours PRN  potassium phosphate IVPB 30milliMole(s) once      Allergies    sulfa drugs (Unknown)  sulfamethoxazole (Other)    Intolerances        T(C): , Max: 38 (12-11 @ 14:00)  T(F): , Max: 100.4 (12-11 @ 14:00)  HR: 82  BP: --  BP(mean): --  RR: 18  SpO2: 98%  Wt(kg): --    I & Os for current day (as of 12-12 @ 09:46)  =============================================  IN:    Osmolite: 936 ml    Solution: 260 ml    Packed Red Blood Cells: 200 ml    IV PiggyBack: 102 ml    Total IN: 1498 ml  ---------------------------------------------  OUT:    Bulb: 195 ml    Total OUT: 195 ml  ---------------------------------------------  Total NET: 1303 ml          LABS:                        7.9    11.3  )-----------( 270      ( 12 Dec 2016 06:05 )             25.8     12 Dec 2016 06:05    146    |  110    |  17     ----------------------------<  106    3.8     |  27     |  0.56     Ca    7.9        12 Dec 2016 06:05  Phos  2.3       12 Dec 2016 06:05  Mg     2.4       12 Dec 2016 06:05                  RADIOLOGY & ADDITIONAL STUDIES:        ***Preliminary Report***    EXAM:  CT ABDOMEN AND PELVIS OC IC                           PROCEDURE DATE:  12/11/2016    Quantity of Contrast in Vial in ml: 100 Contrast Used: Optiray 350  Quantity of Contrast Wasted in ml: 6       INTERPRETATION:  [facility]  Preliminary Radiology Report     EXAM:  CT Chest With Intravenous Contrast.  CT Abdomen and Pelvis With Intravenous Contrast.    CLINICAL HISTORY:  56 years old, female; Condition or disease; Gi device placement or   management; Other: Gastric  bypass; Lung condition and disease; Pleural effusion; Other: Unknown;   Prior surgery; Surgery date:  <1 month; Surgery type: Gastric bypass, revision; Additional info:   Gastric bypass revision w/aamir en y,  11/28, subs ; comments: Gastric bypass revision w/aamir en y, 11/28,   subsequent leak, now w/ fevers,  leuk ; nurse notes: Gastric bypass revision w/aamir en y, 11/28,   subsequent leak, now w/ fevers,  leukocytosis, pls eval for abscess    TECHNIQUE:  Axial computed tomography images of the chest, abdomen and pelvis with   intravenous contrast.  MIP reconstructed images were created and reviewed.  Coronal and sagittal reformatted images were created and reviewed.    COMPARISON:  CT ANGIO CHEST PE PROTOCOL AND CT ABDOMEN/PELVIS 12/3/2016    FINDINGS:  CHEST:  Lungs: Bibasilar atelectasis/consolidation and additional groundglass   opacities bilaterally, grossly  unchanged from previous study.  Pleural spaces: Slight interval decrease in moderate bilateral pleural   effusions.  Heart: Cardiomegaly. Trace pericardial effusion.  Mediastinum: Interval placement of gastroesophageal stent.    ABDOMEN:  Liver: Hepatomegaly measuring approximately 21-22 cm in craniocaudad   dimension.  Gallbladder and bile ducts: Gallbladder distention with high attenuation   material which may  represent stones and/or sludge versus vicarious excretion of previously   administered intravenous  contrast.  Pancreas: Unremarkable. No ductal dilation. No mass.  Spleen: Unremarkable. No splenomegaly.  Adrenals: Unremarkable. No mass.  Kidneys and ureters: Redemonstration of LEFT renal sinus and parenchymal   cysts. No  hydronephrosis. No solid mass.  Stomach and bowel: Colonic diverticulosis without radiographic evidence   of acute diverticulitis.  Abundant fecal material in the colon which may represent constipation.   Status post gastric bypass  surgery. No evidence of small bowel obstruction.    PELVIS:  Bladder: Partially decompressed urinary bladder.  Reproductive: Suggestion of LEFT adnexal cysts versus focal fluid   collections. Further evaluation  with a pelvic ultrasound may be obtained if indicated.  Appendix: No findings to suggest acute appendicitis.    CHEST, ABDOMEN and PELVIS:  Peritoneum: Stable right-sided percutaneous drainage catheter with the   tip terminating in the LEFT  lateral abdomen. Interval placement of an additional percutaneous   drainage catheter terminating in  the LEFT upper quadrant (series 4 image 58). Although limited due to the   lack of oral contrast,  apparent interval decrease in the multiple loculated rim-enhancing fluid   collections throughout the  abdomen and pelvis and interval decrease in free intraperitoneal air.  Lymph nodes: No pathologically enlarged lymph nodes.  Vasculature: Scattered atherosclerotic calcifications. LEFT sided central   venous catheters with the  tips terminating in the superior vena cava and cavoatrial junction.  Bones: Degenerative changes.  Soft tissues: Diffuse anasarca/subcutaneous edema.  Tubes and lines: Endotracheal tube terminating above the jone. Interval   placement of probable  percutaneous jejunal feeding tube terminating just deep to the mid rectus   abdominis (series 4 image  93).    IMPRESSION:  Chest:  1. Bibasilar atelectasis/consolidation and additional groundglass   opacities bilaterally, grossly  unchanged from previous study.  2. Slight interval decrease in moderate bilateral pleural effusions.  Abdomen/pelvis:  3. Although limited due to the lack of oral contrast, apparent interval   decrease in the multiple  loculated rim-enhancing fluid collections throughout the abdomen and   pelvis and interval decrease in  free intraperitoneal air.  4. Suggestion of LEFT adnexal cysts versus focal fluid collections.   Further evaluation with a pelvic  ultrasound may be obtained if indicated.  5. Hepatomegaly measuring approximately 21-22 cm in craniocaudad   dimension.  6. Gallbladder distention with high attenuation material which may   represent stones and/or sludge  versus vicarious excretion of previously administered intravenous   contrast.  7. Colonic diverticulosis without radiographic evidence of acute   diverticulitis.  8. Abundant fecal material in the colon which may represent constipation.  9. Incidental/non-acute findings are described above.    Thank you for allowing us to participate in the care of your patient.  Dictated and Authenticated by: Fernanda Freitas MD  12/12/2016 6:02 AM Eastern Time ([place] & [place])    The above report was submitted by the [facility] attending radiologist and   copied to [facility] by resident Dr. Singh.            "Thank you for the opportunity to participate in the care of this   patient."    GARCÍA SINGH M.D., RADIOLOGY RESIDENT

## 2016-12-12 NOTE — PROGRESS NOTE ADULT - ASSESSMENT
56YOF s/p bypass revision following failed SIPS (anastamotic leak) now with fevers, CT revealed intra-abd collections adjacent to leak, pt now s/p internal draining pigtail placement and restenting today 12/12.     Neuro:  fentanyl PRN, tylenol prn  CVS: goal MAP>70, normotensive goals.  Albumin 25% q8hrs.   Pulm: extubated. nasal canula, peridex, duonebs  FEN/GI: NPO, J-tube TF to goal. FW 250q4h, Protonix  : voids/incont, post op ATN, lasix prn  Endo: ISS. s/p hydrocortisone taper.   ID: renally dosed Zosyn (12/3--), fluconazole (12/4--),   Heme: SCDs, SQH  Lines/drains: LIJ (12/3-), L IJ Juanjo (12/6-), [place] (12/3-), 2 abd JPs, feeding jtube  Wounds: midline abd wound w/wound vac

## 2016-12-12 NOTE — PROGRESS NOTE ADULT - PROBLEM SELECTOR PLAN 1
- patient's creatinine improved to baseline   - UOP not documented as no Yu catheter.   - volume status improving though still has evidence of pulmonary congestion and pleural effusions on imaging; if need to improve ventilation status, can consider IV LAsix 40mg BID. But no renal indication diurese at present.    - Please limit obligate inputs if possible   - avoid hypotensive episodes; keep MAP > 65

## 2016-12-12 NOTE — PROGRESS NOTE ADULT - ATTENDING COMMENTS
patient was seen and examined. I discussed the case in detail with the resident. I rounded on the patient twice during the day. I reviewed chest x-ray labs and medication. The plan was outlined as above.  She is to continue on the antibiotics and the tube feed. She was extubated

## 2016-12-12 NOTE — PROGRESS NOTE ADULT - SUBJECTIVE AND OBJECTIVE BOX
S: NAEO.  Patient reports    O: Vitals: Vital Signs Last 24 Hrs  T(C): 38.3, Max: 38.3 (12-12 @ 15:00)  T(F): 101, Max: 101 (12-12 @ 15:00)  HR: 110 (82 - 110)  BP: 133/71 (130/81 - 148/95)  BP(mean): --  RR: 40 (12 - 40)  SpO2: 88% (88% - 100%)    CAPILLARY BLOOD GLUCOSE  99 (11 Dec 2016 19:00)      PHYSICAL EXAM:  Neuro: A&Ox3, NAD, grossly neuro intact  CV: RRR, no MRGs  Pulm: CTAB, no wheezes, rales ronchi  Abd: BS (+), Soft, NT, ND  Ext: No edema peripherally or centrally  Vasc: 2+ pulses - radial and PT      I & Os for 24h ending 12-12 @ 07:00  =============================================  IN: 1498 ml / OUT: 195 ml / NET: 1303 ml    I & Os for current day (as of 12-12 @ 16:18)  =============================================  IN: 104 ml / OUT: 0 ml / NET: 104 ml      Labs:   Mode: CPAP with PS, FiO2: 40, PEEP: 7, PS: 10, PIP: 17                          7.9    11.3  )-----------( 270      ( 12 Dec 2016 06:05 )             25.8   12 Dec 2016 06:05    146    |  110    |  17     ----------------------------<  106    3.8     |  27     |  0.56     Ca    7.9        12 Dec 2016 06:05  Phos  2.3       12 Dec 2016 06:05  Mg     2.4       12 Dec 2016 06:05          RADIOLOGY & ADDITIONAL STUDIES: S: NAEO. Febrile o/n. HDS. Good UOP.  Extubated this AM without issue.  GI performed removal of stent, pigtail placement, and then replacement of stent at bedside.    O: Vitals: Vital Signs Last 24 Hrs  T(C): 38.3, Max: 38.3 (12-12 @ 15:00)  T(F): 101, Max: 101 (12-12 @ 15:00)  HR: 110 (82 - 110)  BP: 133/71 (130/81 - 148/95)  BP(mean): --  RR: 40 (12 - 40)  SpO2: 88% (88% - 100%)    CAPILLARY BLOOD GLUCOSE  99 (11 Dec 2016 19:00)    PHYSICAL EXAM:  Neuro: arousable, alert & following commands, NAD  CV: RRR, +S1S2, no m/r/g  Pulm: CTAB, no crackles- diminished breath sounds bibasilar lungs, intubated on CPAP  Abdominal: soft, ND, CHECO x 2 in place, R CHECO with grayish-purulent output, L CHECO with ss output, midline vac functioning normally  Ext: warm, no c/c/e, +SCDs  Vasc: 2+ DP/PT b/l    I & Os for 24h ending 12-12 @ 07:00  =============================================  IN: 1498 ml / OUT: 195 ml / NET: 1303 ml    I & Os for current day (as of 12-12 @ 16:18)  =============================================  IN: 104 ml / OUT: 0 ml / NET: 104 ml      Labs:   Mode: CPAP with PS, FiO2: 40, PEEP: 7, PS: 10, PIP: 17                          7.9    11.3  )-----------( 270      ( 12 Dec 2016 06:05 )             25.8   12 Dec 2016 06:05    146    |  110    |  17     ----------------------------<  106    3.8     |  27     |  0.56     Ca    7.9        12 Dec 2016 06:05  Phos  2.3       12 Dec 2016 06:05  Mg     2.4       12 Dec 2016 06:05    RADIOLOGY & ADDITIONAL STUDIES:  EXAM:  XR CHEST 1 VIEW PORT ROUTINE                           PROCEDURE DATE:  12/12/2016                 INTERPRETATION:    CLINICAL INDICATION: SICU patient, evaluate for interval change    EXAM: Portable AP radiograph of the chest.    COMPARISON: 12/11/2016    FINDINGS:  There is increased hazy opacity throughout the right lung which could be   secondary to projection versus layering pleural effusion. Smaller left   pleural effusion is suspected. Cardiomediastinal silhouette is magnified   and distorted by projection. Endotracheal tube remains in place with its   tip approximately 2.6 cm above the jone. Left internal jugular venous   catheters are grossly unchanged given rotation. Distal esophageal to left   upper quadrant stent is partially imaged.    IMPRESSION:   Limited study by rotation. Bilateral pleural effusions

## 2016-12-12 NOTE — PROGRESS NOTE ADULT - ATTENDING COMMENTS
ASA resolved- brisk urine output- keep negative unless impacting BP to help reduce pleural effusions- If BP does drop toolow then will need IVF's or albumin

## 2016-12-13 NOTE — PROGRESS NOTE ADULT - PROBLEM SELECTOR PLAN 1
- patient's creatinine improved to baseline   - UOP not documented as no Yu catheter.   - Please encourage patient to keep outputs recorded as this may help discern polyuria in the workup for hypernatremia   - volume status improving though still has evidence of pulmonary congestion and pleural effusions on imaging; if need to improve ventilation status, can consider IV LAsix 40mg BID.   But no renal indication diurese at present.    - Please limit obligate inputs if possible   - avoid hypotensive episodes; keep MAP > 65

## 2016-12-13 NOTE — PROGRESS NOTE ADULT - SUBJECTIVE AND OBJECTIVE BOX
Patient seen and examined at bedside.     Patient was extubated and reports this morning she feels short of breath.  Does not report thirst at present.  Cannot inform me about the quantity of her urine output but reports she is urinating.     No accurate urine outputs noted as the patient has no Yu catheter at present       pantoprazole  Injectable 40milliGRAM(s) daily  insulin lispro (HumaLOG) corrective regimen sliding scale  Before meals and at bedtime  chlorhexidine 0.12% Liquid 15milliLiter(s) two times a day  heparin  Injectable 5000Unit(s) every 8 hours  fluconAZOLE IVPB 200milliGRAM(s) every 24 hours  lidocaine 1% Injectable 10milliLiter(s) once  heparin  flush 100 Units/mL Injectable 100Unit(s) every other day  piperacillin/tazobactam IVPB. 3.375Gram(s) every 6 hours  ALBUTerol/ipratropium for Nebulization 3milliLiter(s) every 6 hours  acetaminophen    Suspension. 650milliGRAM(s) every 6 hours PRN  norepinephrine Infusion 0.02MICROgram(s)/kG/Min <Continuous>  HYDROmorphone  Injectable 0.5milliGRAM(s) every 4 hours PRN  fentaNYL    Injectable 75MICROGram(s) once  benzocaine 20% Spray 1Spray(s) every 8 hours PRN  potassium phosphate IVPB 21milliMole(s) once      Allergies    sulfa drugs (Unknown)  sulfamethoxazole (Other)    Intolerances        T(C): , Max: 38.3 (12-12 @ 15:00)  T(F): , Max: 101 (12-12 @ 15:00)  HR: 96  BP: 133/71  BP(mean): --  RR: 33  SpO2: 92%  Wt(kg): --    I & Os for current day (as of 12-13 @ 09:27)  =============================================  IN:    Osmolite: 728 ml    Total IN: 728 ml  ---------------------------------------------  OUT:    VAC (Vacuum Assisted Closure) System: 200 ml    Bulb: 150 ml    Bulb: 70 ml    Total OUT: 420 ml  ---------------------------------------------  Total NET: 308 ml          LABS:                        8.0    9.1   )-----------( 308      ( 13 Dec 2016 05:56 )             25.9     13 Dec 2016 05:56    146    |  112    |  14     ----------------------------<  78     4.0     |  29     |  0.55     Ca    7.8        13 Dec 2016 05:56  Phos  2.3       13 Dec 2016 05:56  Mg     2.5       13 Dec 2016 05:56                  RADIOLOGY & ADDITIONAL STUDIES:

## 2016-12-13 NOTE — PROGRESS NOTE ADULT - SUBJECTIVE AND OBJECTIVE BOX
S: NAEO.  Patient reintubated due to work of breathing/tachypnea.  Spiking low grade temperatures.   Patient denies abd pain/n/v/f/c. + cp likely 2/2 to pneumonia found on CXR after cp work-up.      O: Vitals: Vital Signs Last 24 Hrs  T(C): 39.1, Max: 39.1 (12-14 @ 17:35)  T(F): 102.4, Max: 102.4 (12-14 @ 17:35)  HR: 94 (89 - 102)  BP: 90/51 (90/51 - 90/51)  BP(mean): 60 (60 - 60)  RR: 20 (12 - 25)  SpO2: 97% (92% - 99%)    CAPILLARY BLOOD GLUCOSE  111 (14 Dec 2016 17:00)  111 (14 Dec 2016 12:00)      PHYSICAL EXAM:  Neuro: arousable, alert & following commands, NAD, intubated  CV: RRR, +S1S2, no m/r/g  Pulm: Diminished breath sounds bibasilar lungs and JARVIS, intubated on AC  Abdominal: soft, ND, CHECO x 2 in place, R CHECO with grayish-purulent output, L CHECO with ss output, midline vac functioning normally  Ext: 1+ edema b/l LE, warm, no c/c, +SCDs  Vasc: 2+ DP/PT b/l      I & Os for 24h ending 12-14 @ 07:00  =============================================  IN: 2896 ml / OUT: 1050 ml / NET: 1846 ml    I & Os for current day (as of 12-14 @ 19:41)  =============================================  IN: 1238 ml / OUT: 1330 ml / NET: -92 ml      Labs:   ABG - ( 12 Dec 2016 23:01 )  pH: 7.52  /  pCO2: 34    /  pO2: 83    / HCO3: 27    / Base Excess: 4.2   /  SaO2: 97        Mode: AC/ CMV (Assist Control/ Continuous Mandatory Ventilation), RR (machine): 14, TV (machine): 400, FiO2: 50, PEEP: 6, PIP: 19                     7.5    11.9  )-----------( 387      ( 14 Dec 2016 05:21 )             25.4   14 Dec 2016 05:20    145    |  110    |  16     ----------------------------<  127    3.8     |  27     |  0.70     Ca    7.4        14 Dec 2016 05:20  Phos  3.2       14 Dec 2016 05:20  Mg     2.4       14 Dec 2016 05:20    TPro  x      /  Alb  2.1    /  TBili  x      /  DBili  x      /  AST  x      /  ALT  x      /  AlkPhos  x      14 Dec 2016 05:20      RADIOLOGY & ADDITIONAL STUDIES:    EXAM:  XR CHEST 1 VIEW PORT ROUTINE                           PROCEDURE DATE:  12/14/2016                 INTERPRETATION:    CLINICAL INDICATION: SICU patient, assess for interval change    EXAM: Portable AP radiograph of the chest.    COMPARISON: 12/13/2016 6:35 PM    FINDINGS:  Endotracheal tube, left internal jugular central catheter as well as   esophageal stent are unchanged. There is left pleural effusion which may   be slightly increased from the prior study. There is bibasilar   subsegmental atelectasis. No definite pneumothorax.    IMPRESSION:   Slight interval increase in left pleural effusion. Otherwise, no change   from 12/13/2016.

## 2016-12-13 NOTE — BRIEF OPERATIVE NOTE - OPERATION/FINDINGS
Indication: Mucus plug.    Pre-op Dx:    Preop medication: Versed 4mg    Xray Findings: atelectasis of left side.     Findings:  Consent was obtained. Timeout performed. Patient received 5cc of 4%lidocaine neb.  Bronchoscope inserted through the mouth piece, Vocal cords were visualized, patient noted to cough a large mucus plug at this point which was suctioned. 5cc of 2% lidocaine was instilled onto the vocal cords and into the trachea. Airway evaluation revealed thick mucus in the trachea leading into the left mainstem bronchus which was suctioned with difficulty. JARVIS and LLL evaluation revealed thick mucus that was suctioned with a bronchial wash. RUL, RML, RLL appeared clear. . Bronchoscope then withdrawn. No bleeding noted    Specimens: bronchial wash.

## 2016-12-13 NOTE — PROGRESS NOTE ADULT - SUBJECTIVE AND OBJECTIVE BOX
Pt seen and examined at bedside. Patient had a bronchoscopy for mucous plugging this morning and was on nasal CPAP. Pt noted mild to moderate abdominal discomfort around incision site. S/p EGD yesterday with placement of double pigtail 7 [place] 7 cm biliary stent into the esophageal defect as internal drain covered by an esophageal stent. Peritoneal defect washed out with hydrogen peroxide. CHECO output overnight 70 cc.      REVIEW OF SYSTEMS:  Constitutional: No fever, weight loss or fatigue  Cardiovascular: No chest pain, palpitations, dizziness + leg swelling  Gastrointestinal: + abdominal or epigastric pain. + nausea no vomiting or hematemesis; No diarrhea or constipation. No melena or hematochezia.  Skin: No itching, burning, rashes or lesions       MEDICATIONS:  MEDICATIONS  (STANDING):  pantoprazole  Injectable 40milliGRAM(s) IV Push daily  insulin lispro (HumaLOG) corrective regimen sliding scale  SubCutaneous Before meals and at bedtime  chlorhexidine 0.12% Liquid 15milliLiter(s) Swish and Spit two times a day  heparin  Injectable 5000Unit(s) SubCutaneous every 8 hours  fluconAZOLE IVPB 200milliGRAM(s) IV Intermittent every 24 hours  lidocaine 1% Injectable 10milliLiter(s) Local Injection once  heparin  flush 100 Units/mL Injectable 100Unit(s) IV Push every other day  piperacillin/tazobactam IVPB. 3.375Gram(s) IV Intermittent every 6 hours  ALBUTerol/ipratropium for Nebulization 3milliLiter(s) Nebulizer every 6 hours  norepinephrine Infusion 0.02MICROgram(s)/kG/Min IV Continuous <Continuous>  fentaNYL    Injectable 75MICROGram(s) IV Push once  potassium phosphate IVPB 21milliMole(s) IV Intermittent once  acetylcysteine 20% Inhalation 3milliLiter(s) Inhalation every 8 hours    MEDICATIONS  (PRN):  acetaminophen    Suspension. 650milliGRAM(s) Oral every 6 hours PRN Mild Pain (1 - 3)  HYDROmorphone  Injectable 0.5milliGRAM(s) IV Push every 4 hours PRN Severe Pain (7 - 10)  benzocaine 20% Spray 1Spray(s) Topical every 8 hours PRN sore throat      Allergies    sulfa drugs (Unknown)  sulfamethoxazole (Other)    Intolerances        Vital Signs Last 24 Hrs  T(C): 37.8, Max: 38.3 (12-12 @ 15:00)  T(F): 100, Max: 101 (12-12 @ 15:00)  HR: 96 (86 - 110)  BP: 133/71 (131/82 - 133/71)  BP(mean): --  RR: 33 (17 - 40)  SpO2: 92% (88% - 99%)    I & Os for current day (as of 12-13 @ 11:30)  =============================================  IN: 728 ml / OUT: 420 ml / NET: 308 ml      PHYSICAL EXAM:    General: Well developed; well nourished; in no acute distress  HEENT: MMM, conjunctiva and sclera clear  Gastrointestinal: Soft TTP over incision sites no guarding or rebound tenderness No hepatosplenomegaly  Skin: Warm and dry. No obvious rash    LABS:  CBC Full  -  ( 13 Dec 2016 05:56 )  WBC Count : 9.1 K/uL  Hemoglobin : 8.0 g/dL  Hematocrit : 25.9 %  Platelet Count - Automated : 308 K/uL  Mean Cell Volume : 82.2 fL  Mean Cell Hemoglobin : 25.4 pg  Mean Cell Hemoglobin Concentration : 30.9 g/dL  Auto Neutrophil # : x  Auto Lymphocyte # : x  Auto Monocyte # : x  Auto Eosinophil # : x  Auto Basophil # : x  Auto Neutrophil % : 82.6 %  Auto Lymphocyte % : 9.1 %  Auto Monocyte % : 5.8 %  Auto Eosinophil % : 2.1 %  Auto Basophil % : 0.4 %    13 Dec 2016 05:56    146    |  112    |  14     ----------------------------<  78     4.0     |  29     |  0.55     Ca    7.8        13 Dec 2016 05:56  Phos  2.3       13 Dec 2016 05:56  Mg     2.5       13 Dec 2016 05:56    CXR:  Status post esophageal stent placement. Complete   opacification of the left lung that may represent layering effusion.    CT abdomen  Interval surgery and placement of endoluminal stent extending from the   distal esophagus to jejunal loop distal to the gastrojejunal anastomosis.   Limited evaluation for leakage without oral contrast.    Small to moderate size ascites, decreased since prior study. Largest   pocket of ascites measuring 14 x 5 cm in the upper pelvis as described   above. Minimal free air, decreased since prior study.     Prominent enhancement of several small bowel loops in the lower abdomen   and upper pelvis could be reactive. Peritonitis is not excluded.     Small bilateral pleural effusions, similar to the prior study.   Consolidations in the lung bases is likely atelectatic in nature      EGD:   - new granulation tissue in esophagus and peritoneal cavity   - Esophageal stent removed with rat tooth forceps  - aggressive washing with hydrogen peroxide of the peritoneal cavity  - 7 [place] x 7 cm double pigtail stent placed into defect with proximal end in the esophagus.  - Wallflex stent placed over deefect

## 2016-12-13 NOTE — PROGRESS NOTE ADULT - ASSESSMENT
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     Neuro:  propofol gtt, dilaudid PRN, tylenol prn  CVS: goal MAP>70, normotensive goals.  Albumin 25% q8hrs.   Pulm: Bronch today, nasal canula, peridex, duonebs  FEN/GI: NPO, J-tube TF to goal. FW 250q4h, Protonix  : voids/incont, post op ATN, lasix prn  Endo: ISS. s/p hydrocortisone taper.   ID: renally dosed Zosyn (12/3--), fluconazole (12/4--),   Heme: SCDs, SQH  Lines/drains: LIJ (12/3-), L IJ Juanjo (12/6-), Roxton (12/3-), 2 abd JPs, feeding jtube  Wounds: midline abd wound w/wound vac

## 2016-12-13 NOTE — PROGRESS NOTE ADULT - ATTENDING COMMENTS
patient was seen and examined. I discussed the case in detail with the resident. I rounded on the patient twice during the day. I reviewed chest x-ray labs and medication. The plan was outlined as above.  the chest x-ray revealed collapse of the left lung with mediastinal shift. Patient had a bronchoscopy  with clearing the airways from the mucous plugging. The repeat chest x-ray did not reveal any improvement and the patient we plugged her left lung exam. The patient required intubation almost fluctuant with copious secretions. Continue antibiotic. Her Gram stain revealed gram-positive cocci and vancomycin were added to her antibiotic regimen. patient was seen and examined. I discussed the case in detail with the resident. I rounded on the patient twice during the day. I reviewed chest x-ray labs and medication. The plan was outlined as above.  the chest x-ray revealed collapse of the left lung with mediastinal shift. Patient had a bronchoscopy  with clearing the airways from the mucous plugging. The repeat chest x-ray did not reveal any improvement and the patient we plugged her left lung exam. The patient required intubation almost fluctuant with copious secretions. Continue antibiotic. Her Gram stain revealed gram-positive cocci and vancomycin were added to her antibiotic regimen. Agree with the above

## 2016-12-13 NOTE — PROGRESS NOTE ADULT - ASSESSMENT
56 year old critical patient s/p SIPs procedure with free air concerning for anastomotic leak s/p emergent ex lap with abdominal washout found to have leak of esophagojejunostomy which they could not repair due to its location. GI consulted to evaluate for possible Endoscopic evaluation.  Patient s/p esophageal stent placement, peritoneal washout and internal double pigtail stent placement. During endoscopy second defect noted on enteric side of anastomosis.     -Cont IV abx  - Strict NPO to ensure no stent migration and healing of defect   - cont J tube feeds for nutrition  - Repeat EGD in 7 days for repeat washout and possible placement of internal drain, would give time for defect to heal further as granulation tissue noted.       -GI following.     Case d/w Dr. Sumner

## 2016-12-14 NOTE — PROGRESS NOTE ADULT - ASSESSMENT
56 year old critical patient s/p SIPs procedure with free air concerning for anastomotic leak s/p emergent ex lap with abdominal washout found to have leak of esophagojejunostomy which they could not repair due to its location. GI consulted to evaluate for possible Endoscopic evaluation.  Patient s/p esophageal stent placement, peritoneal washout and internal double pigtail stent placement. During endoscopy second defect noted on enteric side of anastomosis. Patient febrile with noted leukocytosis likely 2/2 to PNA.     - Cont IV abx  - Strict NPO to ensure no stent migration and healing of defect   - cont J tube feeds for nutrition  - Repeat EGD in 7 days for repeat washout and possible placement of internal drain, would give time for defect to heal further as granulation tissue noted.   - Monitor CHECO drain output    GI following.       -GI following.     Case d/w Dr. Sumner

## 2016-12-14 NOTE — PROGRESS NOTE ADULT - SUBJECTIVE AND OBJECTIVE BOX
S: Some hypotension overnight.   Patient still spiking fevers, tachycardic.  Patient denies abd pain/cp/sob/n/v/f/c.  Vac change performed today.     O: Vitals: Vital Signs Last 24 Hrs  T(C): 39.1, Max: 39.1 (12-14 @ 17:35)  T(F): 102.4, Max: 102.4 (12-14 @ 17:35)  HR: 94 (89 - 102)  BP: 90/51 (90/51 - 90/51)  BP(mean): 60 (60 - 60)  RR: 20 (12 - 25)  SpO2: 97% (92% - 99%)    CAPILLARY BLOOD GLUCOSE  111 (14 Dec 2016 17:00)  111 (14 Dec 2016 12:00)      PHYSICAL EXAM:  Neuro: arousable, alert & following commands, NAD, intubated  CV: RRR, +S1S2, no m/r/g  Pulm: Diminished breath sounds bibasilar lungs and JARVIS, intubated on AC  Abdominal: soft, ND, CHECO x 2 in place, R CHECO with grayish-purulent output, L CHECO with ss output, midline vac functioning normally  Ext: 1+ edema b/l LE, warm, no c/c, +SCDs  Vasc: 2+ DP/PT b/l    I & Os for 24h ending 12-14 @ 07:00  =============================================  IN: 2896 ml / OUT: 1050 ml / NET: 1846 ml    I & Os for current day (as of 12-14 @ 19:46)  =============================================  IN: 1238 ml / OUT: 1330 ml / NET: -92 ml      Labs:   ABG - ( 12 Dec 2016 23:01 )  pH: 7.52  /  pCO2: 34    /  pO2: 83    / HCO3: 27    / Base Excess: 4.2   /  SaO2: 97              Mode: AC/ CMV (Assist Control/ Continuous Mandatory Ventilation), RR (machine): 14, TV (machine): 400, FiO2: 50, PEEP: 6, PIP: 19                          7.5    11.9  )-----------( 387      ( 14 Dec 2016 05:21 )             25.4   14 Dec 2016 05:20    145    |  110    |  16     ----------------------------<  127    3.8     |  27     |  0.70     Ca    7.4        14 Dec 2016 05:20  Phos  3.2       14 Dec 2016 05:20  Mg     2.4       14 Dec 2016 05:20    TPro  x      /  Alb  2.1    /  TBili  x      /  DBili  x      /  AST  x      /  ALT  x      /  AlkPhos  x      14 Dec 2016 05:20      RADIOLOGY & ADDITIONAL STUDIES:  EXAM:  XR CHEST 1 VIEW PORT ROUTINE                           PROCEDURE DATE:  12/14/2016                 INTERPRETATION:    CLINICAL INDICATION: SICU patient, assess for interval change    EXAM: Portable AP radiograph of the chest.    COMPARISON: 12/13/2016 6:35 PM    FINDINGS:  Endotracheal tube, left internal jugular central catheter as well as   esophageal stent are unchanged. There is left pleural effusion which may   be slightly increased from the prior study. There is bibasilar   subsegmental atelectasis. No definite pneumothorax.    IMPRESSION:   Slight interval increase in left pleural effusion. Otherwise, no change   from 12/13/2016.

## 2016-12-14 NOTE — PROGRESS NOTE ADULT - ATTENDING COMMENTS
the patient was seen and examined.  I rounded on the patient twice during the day. the patient was seen and examined.  I rounded on the patient twice during the day.  I discussed the case with the surgical resident.  I reviewed the labs meds and CXR.  Plan outlined.  The US of the chest revealed small pleural effusion.  The picture is consistent with septic shock from nosocomial pneumonia.  The bronchial wash positive for gram negative rods  Discontinue the vancomycin and zosyn and start imipenium awaiting the cultures.  On tube feed she might need trach

## 2016-12-14 NOTE — PROGRESS NOTE ADULT - SUBJECTIVE AND OBJECTIVE BOX
Pt seen and examined at bedside. Patient intubated and sedated. Febrile overnight. Minimally CHECO output appreciated          MEDICATIONS:  MEDICATIONS  (STANDING):  pantoprazole  Injectable 40milliGRAM(s) IV Push daily  insulin lispro (HumaLOG) corrective regimen sliding scale  SubCutaneous Before meals and at bedtime  chlorhexidine 0.12% Liquid 15milliLiter(s) Swish and Spit two times a day  heparin  Injectable 5000Unit(s) SubCutaneous every 8 hours  fluconAZOLE IVPB 200milliGRAM(s) IV Intermittent every 24 hours  heparin  flush 100 Units/mL Injectable 100Unit(s) IV Push every other day  ALBUTerol/ipratropium for Nebulization 3milliLiter(s) Nebulizer every 6 hours  acetylcysteine 20% Inhalation 3milliLiter(s) Inhalation every 8 hours  propofol Infusion 6.332MICROgram(s)/kG/Min IV Continuous <Continuous>  piperacillin/tazobactam IVPB. 4.5Gram(s) IV Intermittent every 6 hours  vancomycin  IVPB  IV Intermittent   vancomycin  IVPB 1500milliGRAM(s) IV Intermittent every 12 hours  norepinephrine Infusion 0.02MICROgram(s)/kG/Min IV Continuous <Continuous>    MEDICATIONS  (PRN):  acetaminophen    Suspension. 650milliGRAM(s) Oral every 6 hours PRN Mild Pain (1 - 3)  HYDROmorphone  Injectable 0.5milliGRAM(s) IV Push every 4 hours PRN Severe Pain (7 - 10)      Allergies    sulfa drugs (Unknown)  sulfamethoxazole (Other)    Intolerances        Vital Signs Last 24 Hrs  T(C): 37.2, Max: 38.8 (12-13 @ 22:45)  T(F): 99, Max: 101.8 (12-13 @ 22:45)  HR: 96 (89 - 104)  BP: 90/51 (90/51 - 90/51)  BP(mean): 60 (60 - 60)  RR: 12 (12 - 25)  SpO2: 93% (93% - 99%)  I & Os for 24h ending 12-14 @ 07:00  =============================================  IN: 2896 ml / OUT: 1050 ml / NET: 1846 ml    I & Os for current day (as of 12-14 @ 15:52)  =============================================  IN: 1004 ml / OUT: 635 ml / NET: 369 ml      PHYSICAL EXAM:    General: intubated, sedated   HEENT: anicteric   Gastrointestinal: obese, soft, bs+, abd wound freshly packed.     LABS:  ABG - ( 12 Dec 2016 23:01 )  pH: 7.52  /  pCO2: 34    /  pO2: 83    / HCO3: 27    / Base Excess: 4.2   /  SaO2: 97                  CBC Full  -  ( 14 Dec 2016 05:21 )  WBC Count : 11.9 K/uL  Hemoglobin : 7.5 g/dL  Hematocrit : 25.4 %  Platelet Count - Automated : 387 K/uL  Mean Cell Volume : 83.0 fL  Mean Cell Hemoglobin : 24.5 pg  Mean Cell Hemoglobin Concentration : 29.5 g/dL  Auto Neutrophil # : x  Auto Lymphocyte # : x  Auto Monocyte # : x  Auto Eosinophil # : x  Auto Basophil # : x  Auto Neutrophil % : x  Auto Lymphocyte % : x  Auto Monocyte % : x  Auto Eosinophil % : x  Auto Basophil % : x    14 Dec 2016 05:20    145    |  110    |  16     ----------------------------<  127    3.8     |  27     |  0.70     Ca    7.4        14 Dec 2016 05:20  Phos  3.2       14 Dec 2016 05:20  Mg     2.4       14 Dec 2016 05:20    TPro  x      /  Alb  2.1    /  TBili  x      /  DBili  x      /  AST  x      /  ALT  x      /  AlkPhos  x      14 Dec 2016 05:20                      RADIOLOGY & ADDITIONAL STUDIES (The following images were personally reviewed):

## 2016-12-14 NOTE — PROGRESS NOTE ADULT - SUBJECTIVE AND OBJECTIVE BOX
On followup, the left int jugular HD catheter was removed. The site is non-indurated and noninflamed. Notes and cultures are followed.

## 2016-12-14 NOTE — PROGRESS NOTE ADULT - ASSESSMENT
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     Neuro:  propofol gtt, dilaudid PRN, tylenol prn  CVS: goal MAP>70, normotensive goals.   Pulm: /60/12/5, nasal canula, peridex, duonebs, mucomyst  FEN/GI: NPO, J-tube TF to goal. FW 250q4h, Protonix  : voids/incont, post op ATN, lasix prn  Endo: ISS. s/p hydrocortisone taper.   ID: GPC in sputum: Imipanem(12/14-), fluconazole (12/4--), \\ dc'd vanc (12/13-14);  Zosyn (12/3-14),   Heme: SCDs, SQH  Lines/drains: LIJ (12/3-), L IJ Juanjo (12/6-12/14), Radha (12/3-), 2 abd JPs, feeding jtube  Wounds: midline abd wound w/wound vac changes MWF

## 2016-12-15 NOTE — PROGRESS NOTE ADULT - ATTENDING COMMENTS
creatinine remains in excellent range.  Urine output has gone from excessive, to acceptable- to on low side today- may need fluid bolus.  check urine Na-   to re-assess

## 2016-12-15 NOTE — PROGRESS NOTE ADULT - PROBLEM SELECTOR PROBLEM 4
Hypernatremia
Hypophosphatemia

## 2016-12-15 NOTE — PROGRESS NOTE ADULT - PROBLEM/PLAN-4
DISPLAY PLAN FREE TEXT

## 2016-12-15 NOTE — PROGRESS NOTE ADULT - MS EXT PE MLT D E PC
no pedal edema/normal
normal/no pedal edema
pedal edema/normal
no pedal edema/normal
pedal edema/normal

## 2016-12-15 NOTE — PROGRESS NOTE ADULT - SUBJECTIVE AND OBJECTIVE BOX
S: JLEO.  Patient reports    O: Vitals: Vital Signs Last 24 Hrs  T(C): 38.4, Max: 39.1 (12-14 @ 17:35)  T(F): 101.2, Max: 102.4 (12-14 @ 17:35)  HR: 97 (86 - 101)  BP: 90/51 (90/51 - 90/51)  BP(mean): 60 (60 - 60)  RR: 24 (12 - 25)  SpO2: 97% (92% - 99%)    CAPILLARY BLOOD GLUCOSE  121 (14 Dec 2016 22:00)  111 (14 Dec 2016 17:00)  111 (14 Dec 2016 12:00)      PHYSICAL EXAM:  Neuro: A&Ox3, NAD, grossly neuro intact  CV: RRR, no MRGs  Pulm: CTAB, no wheezes, rales ronchi  Abd: BS (+), Soft, NT, ND  Ext: No edema peripherally or centrally  Vasc: 2+ pulses - radial and PT      I & Os for 24h ending 12-14 @ 07:00  =============================================  IN: 2896 ml / OUT: 1050 ml / NET: 1846 ml    I & Os for current day (as of 12-15 @ 06:37)  =============================================  IN: 2288 ml / OUT: 2245 ml / NET: 43 ml      Labs:   Mode: AC/ CMV (Assist Control/ Continuous Mandatory Ventilation), RR (machine): 14, TV (machine): 400, FiO2: 60, PEEP: 6, PIP: 24                          7.2    10.0  )-----------( 369      ( 15 Dec 2016 05:44 )             23.3   15 Dec 2016 05:44    142    |  107    |  14     ----------------------------<  112    3.9     |  25     |  0.60     Ca    7.4        15 Dec 2016 05:44  Phos  2.4       15 Dec 2016 05:44  Mg     2.4       15 Dec 2016 05:44    TPro  x      /  Alb  2.1    /  TBili  x      /  DBili  x      /  AST  x      /  ALT  x      /  AlkPhos  x      14 Dec 2016 05:20        RADIOLOGY & ADDITIONAL STUDIES: S: NAEO.  Intubated but arousable. Denies any complaints.  Fever trend coming down, leukocytosis improving, and CXR with improvement 2/2 imipenem.  Patient denies abd pain/cp/sob/n/v/f/c.    O: Vitals: Vital Signs Last 24 Hrs  T(C): 38.4, Max: 39.1 (12-14 @ 17:35)  T(F): 101.2, Max: 102.4 (12-14 @ 17:35)  HR: 97 (86 - 101)  BP: 90/51 (90/51 - 90/51)  BP(mean): 60 (60 - 60)  RR: 24 (12 - 25)  SpO2: 97% (92% - 99%)    CAPILLARY BLOOD GLUCOSE  121 (14 Dec 2016 22:00)  111 (14 Dec 2016 17:00)  111 (14 Dec 2016 12:00)      PHYSICAL EXAM:  Neuro: arousable, alert & following commands, NAD, intubated  CV: Regular rhythm, tachycardic, +S1S2, no m/r/g  Pulm: Diminished breath sounds LLL, CTA R-side/JARVIS,  intubated on AC  Abdominal: soft, ND,  R&L JPs with dark green output, midline vac functioning normally  Ext: 1+ edema b/l LE, warm, no c/c, +SCDs  Vasc: 2+ DP/PT b/l      I & Os for 24h ending 12-14 @ 07:00  =============================================  IN: 2896 ml / OUT: 1050 ml / NET: 1846 ml    I & Os for current day (as of 12-15 @ 06:37)  =============================================  IN: 2288 ml / OUT: 2245 ml / NET: 43 ml      Labs:   Mode: AC/ CMV (Assist Control/ Continuous Mandatory Ventilation), RR (machine): 14, TV (machine): 400, FiO2: 60, PEEP: 6, PIP: 24                          7.2    10.0  )-----------( 369      ( 15 Dec 2016 05:44 )             23.3   15 Dec 2016 05:44    142    |  107    |  14     ----------------------------<  112    3.9     |  25     |  0.60     Ca    7.4        15 Dec 2016 05:44  Phos  2.4       15 Dec 2016 05:44  Mg     2.4       15 Dec 2016 05:44    TPro  x      /  Alb  2.1    /  TBili  x      /  DBili  x      /  AST  x      /  ALT  x      /  AlkPhos  x      14 Dec 2016 05:20    RADIOLOGY & ADDITIONAL STUDIES:  EXAM:  XR CHEST 1 VIEW PORT ROUTINE                           PROCEDURE DATE:  12/14/2016                 INTERPRETATION:    CLINICAL INDICATION: SICU patient, assess for interval change    EXAM: Portable AP radiograph of the chest.    COMPARISON: 12/13/2016 6:35 PM    FINDINGS:  Endotracheal tube, left internal jugular central catheter as well as   esophageal stent are unchanged. There is left pleural effusion which may   be slightly increased from the prior study. There is bibasilar   subsegmental atelectasis. No definite pneumothorax.    IMPRESSION:   Slight interval increase in left pleural effusion. Otherwise, no change   from 12/13/2016.

## 2016-12-15 NOTE — PROGRESS NOTE ADULT - PROBLEM SELECTOR PLAN 4
- recommend aggressive IV repletion of phos - Kphos IV is acceptable
- recommend free water - 250 mg Q 6
- recommend aggressive IV repletion of phos - Kphos IV is acceptable
Replete phosphorus via IV route
Replete phosphorus via IV route
Replete phosphorus to > 2.5
Replete phosphorus to > 2.5

## 2016-12-15 NOTE — PROGRESS NOTE ADULT - ASSESSMENT
56 year old critical patient s/p SIPs procedure with free air concerning for anastomotic leak s/p emergent ex lap with abdominal washout found to have leak of esophagojejunostomy which they could not repair due to its location. GI consulted to evaluate for possible Endoscopic evaluation.  Patient s/p esophageal stent placement, peritoneal washout and internal double pigtail stent placement. During endoscopy second defect noted on enteric side of anastomosis. Patient febrile with noted leukocytosis likely 2/2 to PNA.     - Bronch cx results appreciated  - Cont IV abx  - Strict NPO to ensure no stent migration and healing of defect   - cont J tube feeds for nutrition  - Repeat EGD next week for repeat washout and possible placement of internal drain, would give time for defect to heal further as granulation tissue noted.   - Monitor CHECO drain output,. please notify GI if CHECO output increases    GI following.       -GI following.     Case d/w Dr. Sumner

## 2016-12-15 NOTE — PROGRESS NOTE ADULT - ATTENDING COMMENTS
The patient was seen and examined.  I rounded on the patient twice.  I discussed the case with the resident.  I reviewed labs, meds, and CXR.  Plan outlined.

## 2016-12-15 NOTE — PROGRESS NOTE ADULT - ASSESSMENT
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     Neuro:  propofol gtt, dilaudid PRN, tylenol prn  CVS: goal MAP>70, normotensive goals.   Pulm: /60/12/5, nasal canula, peridex, duonebs, mucomyst  FEN/GI: NPO, J-tube TF to goal. FW 250q4h, Protonix  : voids/incont, post op ATN, lasix prn  Endo: ISS. s/p hydrocortisone taper.   ID: pseudomonas in sputum: Imipenem(12/14-), fluconazole (12/4--), \\ dc'd vanc (12/13-14);  Zosyn (12/3-14),   Heme: SCDs, SQH  Lines/drains: LIJ (12/3-), L IJ Juanjo (12/6-12/14), Radha (12/3-), 2 abd JPs, feeding jtube  Wounds: midline abd wound w/wound vac changes MWF

## 2016-12-15 NOTE — PROGRESS NOTE ADULT - SUBJECTIVE AND OBJECTIVE BOX
Pt seen and examined at bedside. Patient continue to be febrile overnight. Patient responsive to verbal stimuli.       MEDICATIONS:  MEDICATIONS  (STANDING):  pantoprazole  Injectable 40milliGRAM(s) IV Push daily  insulin lispro (HumaLOG) corrective regimen sliding scale  SubCutaneous Before meals and at bedtime  chlorhexidine 0.12% Liquid 15milliLiter(s) Swish and Spit two times a day  heparin  Injectable 5000Unit(s) SubCutaneous every 8 hours  fluconAZOLE IVPB 200milliGRAM(s) IV Intermittent every 24 hours  heparin  flush 100 Units/mL Injectable 100Unit(s) IV Push every other day  ALBUTerol/ipratropium for Nebulization 3milliLiter(s) Nebulizer every 6 hours  acetylcysteine 20% Inhalation 3milliLiter(s) Inhalation every 8 hours  propofol Infusion 6.332MICROgram(s)/kG/Min IV Continuous <Continuous>  norepinephrine Infusion 0.02MICROgram(s)/kG/Min IV Continuous <Continuous>  imipenem/cilastatin  IVPB  IV Intermittent   imipenem/cilastatin  IVPB 500milliGRAM(s) IV Intermittent every 6 hours  furosemide   Injectable 40milliGRAM(s) IV Push once    MEDICATIONS  (PRN):  acetaminophen    Suspension. 650milliGRAM(s) Oral every 6 hours PRN Mild Pain (1 - 3)  HYDROmorphone  Injectable 0.5milliGRAM(s) IV Push every 4 hours PRN Severe Pain (7 - 10)      Allergies    sulfa drugs (Unknown)  sulfamethoxazole (Other)    Intolerances        Vital Signs Last 24 Hrs  T(C): 38.4, Max: 39.1 (12-14 @ 17:35)  T(F): 101.2, Max: 102.4 (12-14 @ 17:35)  HR: 104 (86 - 114)  BP: 90/51 (90/51 - 90/51)  BP(mean): 60 (60 - 60)  RR: 26 (12 - 26)  SpO2: 94% (92% - 98%)  I & Os for 24h ending 12-15 @ 07:00  =============================================  IN: 2356 ml / OUT: 2275 ml / NET: 81 ml    I & Os for current day (as of 12-15 @ 10:37)  =============================================  IN: 84 ml / OUT: 75 ml / NET: 9 ml      PHYSICAL EXAM:    General: intubated, responds appropriately to verbal commands  HEENT: anicteric   Gastrointestinal: Soft non-tender, bs+, CHECO drains with minimal output,      LABS:      CBC Full  -  ( 15 Dec 2016 05:44 )  WBC Count : 10.0 K/uL  Hemoglobin : 7.2 g/dL  Hematocrit : 23.3 %  Platelet Count - Automated : 369 K/uL  Mean Cell Volume : 81.8 fL  Mean Cell Hemoglobin : 25.3 pg  Mean Cell Hemoglobin Concentration : 30.9 g/dL      15 Dec 2016 05:44    142    |  107    |  14     ----------------------------<  112    3.9     |  25     |  0.60     Ca    7.4        15 Dec 2016 05:44  Phos  2.4       15 Dec 2016 05:44  Mg     2.4       15 Dec 2016 05:44    TPro  x      /  Alb  2.1    /  TBili  x      /  DBili  x      /  AST  x      /  ALT  x      /  AlkPhos  x      14 Dec 2016 05:20                      RADIOLOGY & ADDITIONAL STUDIES (The following images were personally reviewed):

## 2016-12-15 NOTE — PROGRESS NOTE ADULT - SUBJECTIVE AND OBJECTIVE BOX
Patient seen and examined at bedside.   Patient remains intubated  Urine output robust through previous 24 hour time period  Hourly urine output since 7AM appears oliguric on an hourly basis (by virtue of AKIN criteria 0.5ml/kg/hr)   Received IV Lasix 40mg x 1 by primary team to stimulate urine output   Labs noted     No hypotension noted on the arterial line recordings     pantoprazole  Injectable 40milliGRAM(s) daily  insulin lispro (HumaLOG) corrective regimen sliding scale  Before meals and at bedtime  chlorhexidine 0.12% Liquid 15milliLiter(s) two times a day  heparin  Injectable 5000Unit(s) every 8 hours  fluconAZOLE IVPB 200milliGRAM(s) every 24 hours  heparin  flush 100 Units/mL Injectable 100Unit(s) every other day  ALBUTerol/ipratropium for Nebulization 3milliLiter(s) every 6 hours  acetaminophen    Suspension. 650milliGRAM(s) every 6 hours PRN  HYDROmorphone  Injectable 0.5milliGRAM(s) every 4 hours PRN  acetylcysteine 20% Inhalation 3milliLiter(s) every 8 hours  propofol Infusion 6.332MICROgram(s)/kG/Min <Continuous>  norepinephrine Infusion 0.02MICROgram(s)/kG/Min <Continuous>  imipenem/cilastatin  IVPB    imipenem/cilastatin  IVPB 500milliGRAM(s) every 6 hours  furosemide   Injectable 40milliGRAM(s) once      Allergies    sulfa drugs (Unknown)  sulfamethoxazole (Other)    Intolerances        T(C): , Max: 39.1 (12-14 @ 17:35)  T(F): , Max: 102.4 (12-14 @ 17:35)  HR: 104  BP: 90/51  BP(mean): 60  RR: 26  SpO2: 94%  Wt(kg): --  I & Os for 24h ending 12-15 @ 07:00  =============================================  IN:    Osmolite: 1040 ml    IV PiggyBack: 850 ml    norepinephrine Infusion: 118 ml    sodium chloride 0.9%: 118 ml    IV Solution: 115 ml    propofol Infusion: 115 ml    Total IN: 2356 ml  ---------------------------------------------  OUT:    Ureteral Catheter: 1820 ml    Bulb: 350 ml    VAC (Vacuum Assisted Closure) System: 75 ml    Bulb: 30 ml    Total OUT: 2275 ml  ---------------------------------------------  Total NET: 81 ml    I & Os for current day (as of 12-15 @ 10:08)  =============================================  IN:    Osmolite: 52 ml    IV Solution: 10 ml    propofol Infusion: 10 ml    norepinephrine Infusion: 6 ml    sodium chloride 0.9%: 6 ml    Total IN: 84 ml  ---------------------------------------------  OUT:    Ureteral Catheter: 75 ml    Total OUT: 75 ml  ---------------------------------------------  Total NET: 9 ml          LABS:                        7.2    10.0  )-----------( 369      ( 15 Dec 2016 05:44 )             23.3     15 Dec 2016 05:44    142    |  107    |  14     ----------------------------<  112    3.9     |  25     |  0.60     Ca    7.4        15 Dec 2016 05:44  Phos  2.4       15 Dec 2016 05:44  Mg     2.4       15 Dec 2016 05:44    TPro  x      /  Alb  2.1    /  TBili  x      /  DBili  x      /  AST  x      /  ALT  x      /  AlkPhos  x      14 Dec 2016 05:20                RADIOLOGY & ADDITIONAL STUDIES:

## 2016-12-15 NOTE — PROGRESS NOTE ADULT - CVS HE PE MLT D E PC
regular rate and rhythm

## 2016-12-15 NOTE — PROGRESS NOTE ADULT - PROBLEM SELECTOR PLAN 3
- recommend free water - 250 mg Q 6
- replete aggressively  - can give kphos IV as patient with hypophosphatemia as well
- replete aggressively  - please do not give diuretic until K is >3.5  - recommend IV repletion (10meq x 3 runs) as well as po repletion.
- recommend free water - 250 mg Q 6  Patient noted to have received 250cc of free water and 750 of enteral flushes   If the enteral flush is free water, then this would be appropriate
Can decrease oral free water to 1000cc/day at present
Maintain free water input of 1250cc/day to attempt to make up half of free water deficit per day.  Free water deficit is 2.25 liters approximately
As hypernatremia resolved; can decrease oral free water if needed to reduce obligate inputs. Would keep at minimum 500cc of free water per day
Please encourage oral free water intake and ensure patient receives at least 1000cc - 1250cc of free water per day

## 2016-12-15 NOTE — PROGRESS NOTE ADULT - PROBLEM SELECTOR PLAN 1
- patient's creatinine improved to baseline   - with a Yu catheter, the urine output in the past 24 hours is nonoliguric; but current hourly trends have been below the 0.5mg/kg/hr AKIN criteria.   - VOlume status is hypervolemic (by virtue of the pleura effusions and trace edema; but in light of shock state, intravascular volume is diminished)  - Minimize vasopressor use if the systemic blood pressure can maintain MAP > 65 to promote intraglomerular perfusion  - Agree with trial of IV Lasix 40mg to attempt to diurese the pleural effusions (provider hand off note reviewed regarding lung ultrasound findings) and also to promote urine flow and also attempt to promote ventilator weaning.   - Check urinalysis, urine sodium/potassium/chloride/creatinine/urea nitrogen/osmolality to receive a window into the renal physiology at present as creatinine elevations will be delayed relative to the renal insult     - Please limit obligate inputs if possible   - avoid hypotensive episodes; keep MAP > 65

## 2016-12-16 NOTE — CONSULT NOTE ADULT - SUBJECTIVE AND OBJECTIVE BOX
Vascular Access Service Consult Note    56yFemaleHEAL ISSUES - PROBLEM Dx:  Hypophosphatemia: Hypophosphatemia  Hypernatremia: Hypernatremia  Hypokalemia: Hypokalemia  Anemia: Anemia  ASA (acute kidney injury): ASA (acute kidney injury)  Obesity: Obesity             Diagnosis: pneumonia    Indications for Vascular Access (Check all that apply)  [ x ]  Antibiotic Therapy       Antibiotic Prescribed:  vancomycin, imipenem                                          Expected Duration of Therapy:    undetermined yet           [  ]  IV Hydration  [  ]  Total Parenteral Nutrition  [  ]  Chemotherapy  [  ]  Difficult Venous Access  [  ]  CVP monitoring  [ x ]  Medications with high potential for tissue necrosis on extravasation  [  ]  Other    Screening (Check all that apply)  Previous Radiation to chest  [  ] Yes      [ x ]  No  Breast Cancer                          [  ] Left     [  ]  Right    [ x ]  No  Lymph Node Dissection         [  ] Left     [  ]  Right    [ x ]  No  Pacemaker or ICD                   [  ] Left     [  ]  Right    [ x ]  No  Upper Extremity DVT             [  ] Left     [  ]  Right    [ x ]  No  Chronic Kidney Disease         [  ]  Yes     [ x ]  No  Hemodialysis                           [  ]  Yes     [ x ]  No  AV Fistula/ Graft                     [  ]  Left    [  ]  Right    [x  ]  No  Temp>101F in past 24 H       [  ]  Yes     [ x ]  No  H/O PICC/Midline                   [ x ]  Yes     [  ]  No    Lab data:                        7.2    8.7   )-----------( 360      ( 16 Dec 2016 05:44 )             24.0     16 Dec 2016 05:44    143    |  107    |  16     ----------------------------<  99     3.9     |  27     |  0.50     Ca    8.0        16 Dec 2016 05:44  Phos  2.2       16 Dec 2016 05:44  Mg     2.5       16 Dec 2016 05:44                I have reviewed the chart, interviewed and examined the patient and determined that this patient:  [x  ] Is a candidate for a PICC line  [  ] Is a candidate for a Midline  [  ] Is not a candidate for vascular access device (reason)    Lumens:    [  ] Single  [  x] Double

## 2016-12-16 NOTE — PROGRESS NOTE ADULT - PROBLEM SELECTOR PLAN 1
- recovered renal function  - recommend keeping patient euvolemic  - electrolytes acceptable  - volume status was hypervolemic initially, however, now significantly improved  - will sign off case, please reconsult as needed.

## 2016-12-16 NOTE — PROGRESS NOTE ADULT - SUBJECTIVE AND OBJECTIVE BOX
Patient seen and examined by me at bedside.  ROS: No chest pain, no sob. (+) abd pain    PAST MEDICAL & SURGICAL HISTORY:  Reflux  Obesity  DVT (deep venous thrombosis): 2013  Depression  Diverticulitis  Hypertension  Gastric bypass status for obesity: gastric sleeve, 6/2012  S/P breast biopsy: 2011, left  S/P colon resection: 2011  S/P knee surgery: repair 2009, 2011  Umbilical hernia: 2000  S/P appendectomy: 1975  S/P tonsillectomy: 1967    PHYSICAL EXAM:  T(C): 36.7, Max: 38 (12-16 @ 00:40)  HR: 96  BP: --  RR: 18  SpO2: 99%  Wt(kg): --  I & Os for 24h ending 12-16 @ 07:00  =============================================  IN:    Osmolite: 780 ml    IV PiggyBack: 100 ml    IV Solution: 70 ml    propofol Infusion: 70 ml    norepinephrine Infusion: 39 ml    sodium chloride 0.9%: 39 ml    Total IN: 1098 ml  ---------------------------------------------  OUT:    Ureteral Catheter: 1855 ml    Bulb: 300 ml    Bulb: 10 ml    Total OUT: 2165 ml  ---------------------------------------------  Total NET: -1067 ml    I & Os for current day (as of 12-16 @ 09:07)  =============================================  IN:    Osmolite: 52 ml    IV Solution: 2.5 ml    propofol Infusion: 2.5 ml    Total IN: 57 ml  ---------------------------------------------  OUT:    Bulb: 850 ml    VAC (Vacuum Assisted Closure) System: 350 ml    Ureteral Catheter: 50 ml    Bulb: 5 ml    Total OUT: 1255 ml  ---------------------------------------------  Total NET: -1198 ml    Weight   General: Patient awake and responds by nodding head.  NAD.  HEENT: dry mucous membranes, no pallor/cyanosis.  Neck: no JVD visible.  Cardiac: S1, S2. RRR.  Respratory: no access muscle use, decreased bs in left lung field  Abdomen: soft. Diffuse minimal tenderness.  No rebound, no guarding.  Skin: no rashes.  Extremities: no LE edema b/l        DATA:                        7.2<L>  8.7   )-----------( 360      ( 16 Dec 2016 05:44 )             24.0<L>    Ferritin, Serum: 261.9 ng/mL <H> (12-10 @ 05:52)      143    |  107    |  16     ----------------------------<  99     Ca:8.0<L> (16 Dec 2016 05:44)  3.9     |  27     |  0.50       eGFR if Non : 108  eGFR if : 125    TPro  x      /  Alb  2.1 g/dL<L>  /  TBili  x      /  DBili  x      /  AST  x      /  ALT  x      /  AlkPhos  x      14 Dec 2016 05:20      MEDICATIONS  (STANDING):  pantoprazole  Injectable 40milliGRAM(s) IV Push daily  insulin lispro (HumaLOG) corrective regimen sliding scale  SubCutaneous Before meals and at bedtime  chlorhexidine 0.12% Liquid 15milliLiter(s) Swish and Spit two times a day  heparin  Injectable 5000Unit(s) SubCutaneous every 8 hours  fluconAZOLE IVPB 200milliGRAM(s) IV Intermittent every 24 hours  heparin  flush 100 Units/mL Injectable 100Unit(s) IV Push every other day  ALBUTerol/ipratropium for Nebulization 3milliLiter(s) Nebulizer every 6 hours  acetylcysteine 20% Inhalation 3milliLiter(s) Inhalation every 8 hours  propofol Infusion 6.332MICROgram(s)/kG/Min IV Continuous <Continuous>  imipenem/cilastatin  IVPB  IV Intermittent   imipenem/cilastatin  IVPB 500milliGRAM(s) IV Intermittent every 6 hours    MEDICATIONS  (PRN):  acetaminophen    Suspension. 650milliGRAM(s) Oral every 6 hours PRN Mild Pain (1 - 3)  HYDROmorphone  Injectable 0.5milliGRAM(s) IV Push every 4 hours PRN Severe Pain (7 - 10)

## 2016-12-16 NOTE — PROGRESS NOTE ADULT - SUBJECTIVE AND OBJECTIVE BOX
Pt seen and examined at bedside. No acute overnight events Patient seen and examined at bedside. Intubated and sedated. Febrile overnight.       MEDICATIONS:  MEDICATIONS  (STANDING):  pantoprazole  Injectable 40milliGRAM(s) IV Push daily  insulin lispro (HumaLOG) corrective regimen sliding scale  SubCutaneous Before meals and at bedtime  chlorhexidine 0.12% Liquid 15milliLiter(s) Swish and Spit two times a day  heparin  Injectable 5000Unit(s) SubCutaneous every 8 hours  fluconAZOLE IVPB 200milliGRAM(s) IV Intermittent every 24 hours  heparin  flush 100 Units/mL Injectable 100Unit(s) IV Push every other day  ALBUTerol/ipratropium for Nebulization 3milliLiter(s) Nebulizer every 6 hours  acetylcysteine 20% Inhalation 3milliLiter(s) Inhalation every 8 hours  propofol Infusion 6.332MICROgram(s)/kG/Min IV Continuous <Continuous>  imipenem/cilastatin  IVPB  IV Intermittent   imipenem/cilastatin  IVPB 500milliGRAM(s) IV Intermittent every 6 hours  midazolam Injectable 2milliGRAM(s) IV Push once    MEDICATIONS  (PRN):  acetaminophen    Suspension. 650milliGRAM(s) Oral every 6 hours PRN Mild Pain (1 - 3)  HYDROmorphone  Injectable 0.5milliGRAM(s) IV Push every 4 hours PRN Severe Pain (7 - 10)      Allergies    sulfa drugs (Unknown)  sulfamethoxazole (Other)    Intolerances        Vital Signs Last 24 Hrs  T(C): 36.7, Max: 38 (12-16 @ 00:40)  T(F): 98.1, Max: 100.4 (12-16 @ 00:40)  HR: 99 (83 - 99)  BP: --  BP(mean): --  RR: 14 (10 - 24)  SpO2: 100% (94% - 100%)  I & Os for 24h ending 12-16 @ 07:00  =============================================  IN: 1098 ml / OUT: 2165 ml / NET: -1067 ml    I & Os for current day (as of 12-16 @ 09:34)  =============================================  IN: 77 ml / OUT: 1605 ml / NET: -1528 ml      PHYSICAL EXAM:    General: intubated, responding to commands   HEENT: MMM, conjunctiva and sclera clear  Gastrointestinal: Soft non-tender non-distended wound site dressed no erythema, CHECO drain without output       LABS:      CBC Full  -  ( 16 Dec 2016 05:44 )  WBC Count : 8.7 K/uL  Hemoglobin : 7.2 g/dL  Hematocrit : 24.0 %  Platelet Count - Automated : 360 K/uL  Mean Cell Volume : 83.0 fL  Mean Cell Hemoglobin : 24.9 pg  Mean Cell Hemoglobin Concentration : 30.0 g/dL    16 Dec 2016 05:44    143    |  107    |  16     ----------------------------<  99     3.9     |  27     |  0.50     Ca    8.0        16 Dec 2016 05:44  Phos  2.2       16 Dec 2016 05:44  Mg     2.5       16 Dec 2016 05:44                        RADIOLOGY & ADDITIONAL STUDIES (The following images were personally reviewed):

## 2016-12-16 NOTE — PROVIDER CONTACT NOTE (OTHER) - BACKGROUND
patient gets daily lasix 40mg
DANNI Sierra says to encourage incentive spirometry and continue to monitor.
Pt assisted with 2 to ambulate to bathroom on 4L of 02 via NC. Returned to bed, pt encouraged to sit on side of bed but refusing. Pt returned to laying position.
s/p exlap abd washout, leak of esophagojejunostomy

## 2016-12-16 NOTE — PROGRESS NOTE ADULT - ATTENDING COMMENTS
renal function normalized, urine output acceptable again.  To discontinue regular visits.  Nephrologically stable at this time.

## 2016-12-16 NOTE — PROGRESS NOTE ADULT - SUBJECTIVE AND OBJECTIVE BOX
55 yo female s/p aamir an y reconstruction 11/28/16 and exlap with abdominal washout 12/3, intubated and sedated.  Pt getting picc line placed in right arm.                          7.2    8.7   )-----------( 360      ( 16 Dec 2016 05:44 )             24.0     16 Dec 2016 05:44    143    |  107    |  16     ----------------------------<  99     3.9     |  27     |  0.50     Ca    8.0        16 Dec 2016 05:44  Phos  2.2       16 Dec 2016 05:44  Mg     2.5       16 Dec 2016 05:44      T(C): 38.6, Max: 38.6 (12-16 @ 09:30)  HR: 91 (83 - 99)  BP: 103/62 (103/62 - 120/79)  RR: 17 (10 - 24)  SpO2: 99% (94% - 100%)  Wt(kg): --    left casey with <5 cc serosanguinous  right casey with about 40 cc of green bilious    gen: sedated  Heart: s1,s2 RRR  Lung: crackles b/l   Abd: mildly distended, soft,   LE: no edema b/l LE,  skin: wound vacc in place, left casey with no surrounding erythema, right casey with no surrounding erythema, no erythema surrounding jtube    55 yo female s/p aamir en y conversion to mod d/s with leak and abdominal washout, febrile over night, intermittent tachycardia, mdr pseudomonas on bronch culture, urine output improved with lasix, septic and appropriate abx started yesterday - impenem-cilastin,     Plan:  c/w imipenem  strict i&o  ppi  nausea control prn  pain control 57 yo female s/p aamir an y reconstruction 11/28/16 and exlap with abdominal washout 12/3, intubated and sedated.  Pt getting picc line placed in right arm.                          7.2    8.7   )-----------( 360      ( 16 Dec 2016 05:44 )             24.0     16 Dec 2016 05:44    143    |  107    |  16     ----------------------------<  99     3.9     |  27     |  0.50     Ca    8.0        16 Dec 2016 05:44  Phos  2.2       16 Dec 2016 05:44  Mg     2.5       16 Dec 2016 05:44      T(C): 38.6, Max: 38.6 (12-16 @ 09:30)  HR: 91 (83 - 99)  BP: 103/62 (103/62 - 120/79)  RR: 17 (10 - 24)  SpO2: 99% (94% - 100%)  Wt(kg): --    left casey with <5 cc serosanguinous  right casye with about 40 cc of green bilious    gen: sedated  Heart: s1,s2 RRR  Lung: crackles b/l   Abd: mildly distended, soft,   LE: no edema b/l LE,  skin: wound vacc in place, left casey with no surrounding erythema, right casey with no surrounding erythema, no erythema surrounding jtube    57 yo female s/p aamir en y conversion to mod d/s with leak and abdominal washout, febrile over night, intermittent tachycardia, mdr pseudomonas on bronch culture, urine output improved with lasix, septic and appropriate abx started yesterday - impenem-cilastin,     Plan:  c/w imipenem  strict i&o  ppi  nausea control prn  pain control  discussed with attending dr tucker  wound vacc change mwf  scds, PT, subq hep 55 yo female s/p aamir an y reconstruction 11/28/16 and exlap with abdominal washout 12/3, intubated and sedated.  Pt getting picc line placed in right arm.                          7.2    8.7   )-----------( 360      ( 16 Dec 2016 05:44 )             24.0     16 Dec 2016 05:44    143    |  107    |  16     ----------------------------<  99     3.9     |  27     |  0.50     Ca    8.0        16 Dec 2016 05:44  Phos  2.2       16 Dec 2016 05:44  Mg     2.5       16 Dec 2016 05:44      T(C): 38.6, Max: 38.6 (12-16 @ 09:30)  HR: 91 (83 - 99)  BP: 103/62 (103/62 - 120/79)  RR: 17 (10 - 24)  SpO2: 99% (94% - 100%)  Wt(kg): --    left casey with <5 cc serosanguinous  right casey with about 40 cc of green bilious    gen: sedated  Heart: s1,s2 RRR  Lung: crackles b/l   Abd: mildly distended, soft,   LE: no edema b/l LE,  skin: wound vacc in place, left casey with no surrounding erythema, right casey with no surrounding erythema, no erythema surrounding jtube    55 yo female s/p aamir en y conversion to mod d/s with leak and abdominal washout, febrile over night, intermittent tachycardia, mdr pseudomonas on bronch culture, urine output improved with lasix, septic and appropriate abx started yesterday - impenem-cilastin,     Plan:  c/w imipenem  strict i&o  ppi  nausea control prn  pain control  discussed with attending dr tucker  wound vacc change mwf  scds, PT, subq hep  c/w jtube feeds per dr tucker

## 2016-12-16 NOTE — BRIEF OPERATIVE NOTE - OPERATION/FINDINGS
Indication: Mucus plug    Pre-op Dx: gastric leak.    History:    Preop medication: 2mg Versed    Xray Findings: total atelectasis of left lung.     Findings:  Bronchoscope inserted through ETT. ETT noted to be in good position. Airway evaluation revealed Sharp Chrissie. JARVIS and LLL evaluation revealed thick white mucus from left main bronchus leading into the distal airways that was suctioned with difficulty. RUL, RML, RLL revealed only scant secretions. Bronchoscope then withdrawn from ETT. No bleeding noted    Specimens: none

## 2016-12-16 NOTE — PROCEDURE NOTE - ADDITIONAL PROCEDURE DETAILS
Indication: Respiratory failure, Hypoxemia    Pre-op Dx: Respiratory failure, Hypoxemia      Preop medication:    Xray Findings: bibasilar infiltrate    Findings:  Bronchoscope inserted through ETT. Airway evaluation revealed Sharp Chrissie. JARVIS and LLL evaluation revealed that reveled no endobronchial lesion and mucus plug. RUL, RML, RLL revealed scanty whitish sputum that was suctioned out. ETT noted to be in good position. Bronchoscope then withdrawn from ETT. No bleeding noted. 5 cc propofol used for the proceedure.    Specimens: Sent for microbiology.
cvp is elevated c/w venous distension, biopatch is applied
attempted to advance picc to full measured length of 36 cm, however picc line would not advance beyond 24 cm. picc removed and re-cut to 24 cm, refloated without issues. ends in subclavian.
emergency procedure for post operative resp failure, hypotension

## 2016-12-17 NOTE — PROGRESS NOTE ADULT - ATTENDING COMMENTS
The patient was seen and examined.  I rounded on the patient twice.  I discussed the case with the resident.  I reviewed labs, meds, and CXR.  Plan outlined.  The patient is clinically stable and failed a weanign trial.  On tube feed.  Davi.

## 2016-12-17 NOTE — PROGRESS NOTE ADULT - ATTENDING COMMENTS
The patient was seen and examined.  I rounded on the patient twice.  I discussed the case with the resident.  I reviewed labs, meds, and CXR.  Plan outlined.  The CXR revealed reexpansion of the left lung.  She failed PSV trial.  She will require trach.  She is off the pressors.  Tolerating tube feed.  Continue current antibiotic. Continue diuresis

## 2016-12-17 NOTE — PROGRESS NOTE ADULT - SUBJECTIVE AND OBJECTIVE BOX
S: NAEO.  Patient bronched in the AM- lots of secretions cleared.  Plan for possible repeat bronch tomorrow.  Patient more alert and awake today.  Having more pain and requesting more pain medicaiton.  Febrile.  Urine/blood cultures sent but likely just post-procedure fever following bronch.   Denies cp/sob/n/v/f/c.    O: Vitals: Vital Signs Last 24 Hrs  T(C): 37.8, Max: 38.6 (12-16 @ 09:30)  T(F): 100.1, Max: 101.4 (12-16 @ 09:30)  HR: 96 (83 - 100)  BP: 103/62 (103/62 - 120/79)  BP(mean): --  RR: 20 (14 - 24)  SpO2: 94% (94% - 100%)    CAPILLARY BLOOD GLUCOSE  113 (16 Dec 2016 22:00)  84 (16 Dec 2016 17:00)  107 (16 Dec 2016 13:00)      PHYSICAL EXAM:  Neuro: arousable, alert & following commands, NAD, intubated  CV: Regular rhythm, tachycardic, +S1S2, no m/r/g  Pulm: Diminished breath sounds LLL, CTA R-side/JARVIS,  intubated on AC  Abdominal: soft, ND,  R&L JPs with dark green output, midline vac functioning normally  Ext: 1+ edema b/l LE, warm, no c/c, +SCDs  Vasc: 2+ DP/PT b/l      I & Os for 24h ending 12-16 @ 07:00  =============================================  IN: 1098 ml / OUT: 2165 ml / NET: -1067 ml    I & Os for current day (as of 12-17 @ 00:00)  =============================================  IN: 1176 ml / OUT: 3223 ml / NET: -2047 ml      Labs:   ABG - ( 16 Dec 2016 19:23 )  pH: 7.52  /  pCO2: 31    /  pO2: 186   / HCO3: 25    / Base Excess: 2.4   /  SaO2: 100         Mode: AC/ CMV (Assist Control/ Continuous Mandatory Ventilation), RR (machine): 14, TV (machine): 400, FiO2: 40, PEEP: 6, PIP: 20                          7.2    8.7   )-----------( 360      ( 16 Dec 2016 05:44 )             24.0   16 Dec 2016 18:47    142    |  109    |  16     ----------------------------<  96     3.8     |  25     |  0.56     Ca    7.9        16 Dec 2016 18:47  Phos  2.2       16 Dec 2016 05:44  Mg     2.5       16 Dec 2016 05:44      Culture - Bronchial (12.13.16 @ 12:29)    -  Tobramycin: S <=4    -  Aztreonam: R >16    -  Ceftazidime: R >16    -  Ciprofloxacin: R >2    -  Gentamicin: S <=4    -  Imipenem: S 2    Gram Stain:   No epithelial cells  Few White blood cells  Rare Bronchial Cells  Moderate Gram Negative Rods  Few Gram Positive Cocci in Pairs and Chains    -  Piperacillin/Tazobactam: R >64    Specimen Source: Bronch Wash bronch wash    Culture Results:   Moderate Pseudomonas aeruginosa (multi drug resistant)  Result called to and read back byDilcia James RN 12/15/2016 10:39:57  Accompanied by normal respiratory roland    Organism Identification: Pseudomonas aeruginosa (multi drug resistant)    Organism: Pseudomonas aeruginosa (multi drug resistant)    Method Type: ALISHA      RADIOLOGY & ADDITIONAL STUDIES:  EXAM:  XR CHEST 1 VIEW PORT ROUTINE                           PROCEDURE DATE:  12/15/2016      INTERPRETATION:    Portable AP Radiograph dated 12/15/2016 3:42 AM    CLINICAL INFORMATION: 56 years, Female, SICU patient for interval change    PRIOR STUDIES: 12/14/2016    FINDINGS:    Endotracheal tube, left central line, and esophageal stent are unchanged.   A bifid pigtail catheter is seen over the upper mid abdomen as well as   radiopaque catheters are seen in the left upper quadrantwhich clinical   correlation is advised. Heart size is suboptimally evaluated on this AP   projection. Hilar and mediastinal contours within normal limits. Hazy   opacification of the left lung is unchanged. Mild interval worsening of   right lower lung zone hazy opacification. No acute osseous abnormalities.   No pneumothorax.    IMPRESSION:  Interval worsening of hazy opacity over the left mid and lower lung zone   thought to represent increasing left-sided pleural effusion.  Persistent small right-sided pleural effusion.  Multiple central lines and support catheters as above.

## 2016-12-17 NOTE — PROGRESS NOTE ADULT - SUBJECTIVE AND OBJECTIVE BOX
SUBJECTIVE: O/N: Gave lasix 40 x2, UOP responded appropriately, needing more dilaudid for breakthrough pain    Vital Signs Last 24 Hrs  T(C): 37.7, Max: 37.8 (12-16 @ 22:20)  T(F): 99.8, Max: 100.1 (12-16 @ 22:20)  HR: 98 (86 - 100)  BP: --  BP(mean): --  RR: 14 (13 - 103)  SpO2: 98% (94% - 100%)    PHYSICAL EXAM    Gen: NAD  Neuro: No deficits  CV: Tachycardic, RR  Pulm: Decreased breath sounds bilaterally  Abd: distended, appropriately TTP at incision site. Midline Wound vac in place to suction    I&O's Detail      LABS:                        6.6    7.0   )-----------( 321      ( 17 Dec 2016 05:32 )             22.1     17 Dec 2016 05:32    144    |  106    |  16     ----------------------------<  113    3.5     |  27     |  0.52     Ca    8.1        17 Dec 2016 05:32  Phos  2.0       17 Dec 2016 05:32  Mg     2.2       17 Dec 2016 05:32    TPro  5.6    /  Alb  1.9    /  TBili  0.5    /  DBili  x      /  AST  37     /  ALT  34     /  AlkPhos  184    17 Dec 2016 05:32          MEDICATIONS  (STANDING):  pantoprazole  Injectable 40milliGRAM(s) IV Push daily  insulin lispro (HumaLOG) corrective regimen sliding scale  SubCutaneous Before meals and at bedtime  chlorhexidine 0.12% Liquid 15milliLiter(s) Swish and Spit two times a day  heparin  Injectable 5000Unit(s) SubCutaneous every 8 hours  fluconAZOLE IVPB 200milliGRAM(s) IV Intermittent every 24 hours  heparin  flush 100 Units/mL Injectable 100Unit(s) IV Push every other day  ALBUTerol/ipratropium for Nebulization 3milliLiter(s) Nebulizer every 6 hours  acetylcysteine 20% Inhalation 3milliLiter(s) Inhalation every 8 hours  imipenem/cilastatin  IVPB  IV Intermittent   imipenem/cilastatin  IVPB 500milliGRAM(s) IV Intermittent every 6 hours  sodium chloride 0.9% lock flush 20milliLiter(s) IV Push once  furosemide   Injectable 40milliGRAM(s) IV Push every 6 hours  albumin human 25% IVPB 50milliLiter(s) IV Intermittent every 8 hours  potassium chloride   Powder 40milliEquivalent(s) Oral every 6 hours    MEDICATIONS  (PRN):  acetaminophen    Suspension. 650milliGRAM(s) Oral every 6 hours PRN Mild Pain (1 - 3)  sodium chloride 0.9% lock flush 10milliLiter(s) IV Push every 1 hour PRN After each medication administration  sodium chloride 0.9% lock flush 10milliLiter(s) IV Push every 12 hours PRN Lumen of catheter NOT used  HYDROmorphone  Injectable 1milliGRAM(s) IV Push every 4 hours PRN Severe Pain (7 - 10)  HYDROmorphone  Injectable 0.5milliGRAM(s) IV Push every 4 hours PRN Moderate Pain (4 - 6)      RADIOLOGY & ADDITIONAL STUDIES:  CXR: Left effusion unchanged. Improvement right effusion. Underlying infiltrates cannot be excluded    ASSESSMENT AND PLAN  56YOF s/p bypass revision following failed SIPS (anastamotic leak) now s/p exploratory laparotomy and washout and placement of a feeding jejunostomy tube 12/3    Neuro:  dilaudid PRN, tylenol prn  CVS: goal MAP>70, normotensive goals.   Pulm: trach /60/12/5, nasal canula, peridex, duonebs, mucomyst  FEN/GI: NPO, J-tube TF to goal.  Protonix  : voids/incont, post op ATN, lasix prn  Endo: ISS.   ID: MDR pseudomonas in sputum: Imipenem(12/14-), fluconazole (12/4--), \\ dc'd vanc (12/13-14);  Zosyn (12/3-14),   Heme: SCDs, SQH  Lines/drains: LIJ (12/3-), L IJ Juanjo (12/6-12/14), Radha (12/3-), 2 abd JPs, feeding jtube  Wounds: midline abd wound w/wound vac changes MWF

## 2016-12-17 NOTE — PROGRESS NOTE ADULT - ASSESSMENT
56 year old critical patient s/p SIPs procedure with free air concerning for anastomotic leak s/p emergent ex lap with abdominal washout found to have leak of esophagojejunostomy which they could not repair due to its location.  Patient s/p esophageal stent placement, peritoneal washout and internal double pigtail stent placement. During endoscopy second defect noted on enteric side of anastomosis. Patient also withPNA, improving WBC.      - Cont IV abx  - Strict NPO to ensure no stent migration and healing of defect   - cont J tube feeds for nutrition  - Repeat EGD next week for repeat washout and possible placement of internal drain, would give time for defect to heal further as granulation tissue noted.   - Monitor CHECO drain output  - Care per SICU team    GI following.

## 2016-12-17 NOTE — PROGRESS NOTE ADULT - SUBJECTIVE AND OBJECTIVE BOX
Pt seen and examined, no acute events O/N    REVIEW OF SYSTEMS:  Constitutional: No fever, weight loss or fatigue  Cardiovascular: No chest pain, palpitations, dizziness or leg swelling  Gastrointestinal: + abdominal pain. No nausea, vomiting or hematemesis; No diarrhea or constipation. No melena or hematochezia.  Skin: No itching, burning, rashes or lesions       MEDICATIONS:  MEDICATIONS  (STANDING):  pantoprazole  Injectable 40milliGRAM(s) IV Push daily  insulin lispro (HumaLOG) corrective regimen sliding scale  SubCutaneous Before meals and at bedtime  chlorhexidine 0.12% Liquid 15milliLiter(s) Swish and Spit two times a day  heparin  Injectable 5000Unit(s) SubCutaneous every 8 hours  fluconAZOLE IVPB 200milliGRAM(s) IV Intermittent every 24 hours  heparin  flush 100 Units/mL Injectable 100Unit(s) IV Push every other day  ALBUTerol/ipratropium for Nebulization 3milliLiter(s) Nebulizer every 6 hours  acetylcysteine 20% Inhalation 3milliLiter(s) Inhalation every 8 hours  propofol Infusion 6.332MICROgram(s)/kG/Min IV Continuous <Continuous>  imipenem/cilastatin  IVPB  IV Intermittent   imipenem/cilastatin  IVPB 500milliGRAM(s) IV Intermittent every 6 hours  sodium chloride 0.9% lock flush 20milliLiter(s) IV Push once  potassium phosphate IVPB 30milliMole(s) IV Intermittent once    MEDICATIONS  (PRN):  acetaminophen    Suspension. 650milliGRAM(s) Oral every 6 hours PRN Mild Pain (1 - 3)  sodium chloride 0.9% lock flush 10milliLiter(s) IV Push every 1 hour PRN After each medication administration  sodium chloride 0.9% lock flush 10milliLiter(s) IV Push every 12 hours PRN Lumen of catheter NOT used  HYDROmorphone  Injectable 1milliGRAM(s) IV Push every 4 hours PRN Severe Pain (7 - 10)  HYDROmorphone  Injectable 0.5milliGRAM(s) IV Push every 4 hours PRN Moderate Pain (4 - 6)      Allergies    sulfa drugs (Unknown)  sulfamethoxazole (Other)    Intolerances        Vital Signs Last 24 Hrs  T(C): 37, Max: 38.1 (12-16 @ 14:39)  T(F): 98.6, Max: 100.5 (12-16 @ 14:39)  HR: 92 (86 - 100)  BP: 103/62 (103/62 - 120/79)  BP(mean): --  RR: 14 (14 - 22)  SpO2: 98% (94% - 100%)  I & Os for 24h ending 12-17 @ 07:00  =============================================  IN: 1900 ml / OUT: 4083 ml / NET: -2183 ml    I & Os for current day (as of 12-17 @ 11:02)  =============================================  IN: 212 ml / OUT: 95 ml / NET: 117 ml      PHYSICAL EXAM:    General: Well developed; well nourished; in no acute distress  HEENT: MMM, conjunctiva and sclera clear  Gastrointestinal: Soft +wound vac in place; no distention, +tender at wound vac and drainage sites  Skin: Warm and dry. No obvious rash    LABS:  CBC Full  -  ( 17 Dec 2016 05:32 )  WBC Count : 7.0 K/uL  Hemoglobin : 6.6 g/dL  Hematocrit : 22.1 %  Platelet Count - Automated : 321 K/uL  Mean Cell Volume : 84.0 fL  Mean Cell Hemoglobin : 25.1 pg  Mean Cell Hemoglobin Concentration : 29.9 g/dL  Auto Neutrophil # : x  Auto Lymphocyte # : x  Auto Monocyte # : x  Auto Eosinophil # : x  Auto Basophil # : x  Auto Neutrophil % : x  Auto Lymphocyte % : x  Auto Monocyte % : x  Auto Eosinophil % : x  Auto Basophil % : x    17 Dec 2016 05:32    144    |  106    |  16     ----------------------------<  113    3.5     |  27     |  0.52     Ca    8.1        17 Dec 2016 05:32  Phos  2.0       17 Dec 2016 05:32  Mg     2.2       17 Dec 2016 05:32    TPro  5.6    /  Alb  1.9    /  TBili  0.5    /  DBili  x      /  AST  37     /  ALT  34     /  AlkPhos  184    17 Dec 2016 05:32                      RADIOLOGY & ADDITIONAL STUDIES:

## 2016-12-18 NOTE — PROGRESS NOTE ADULT - ASSESSMENT
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

## 2016-12-18 NOTE — PROGRESS NOTE ADULT - ASSESSMENT
56 year old critical patient s/p SIPs procedure with free air concerning for anastomotic leak s/p emergent ex lap with abdominal washout found to have leak of esophagojejunostomy which they could not repair due to its location.  Patient s/p esophageal stent placement, peritoneal washout and internal double pigtail stent placement. During endoscopy second defect noted on enteric side of anastomosis. Patient also with PNA, improving WBC.  H/H downtrending; no e/o GI bleeding currently    - Cont IV abx  - Strict NPO to ensure no stent migration and healing of defect   - cont J tube feeds for nutrition  - Repeat EGD next week for repeat washout and possible placement of internal drain, would give time for defect to heal further as granulation tissue noted.   - Monitor CHECO drain output  - Care per SICU team

## 2016-12-18 NOTE — PROGRESS NOTE ADULT - SUBJECTIVE AND OBJECTIVE BOX
Pt seen and examined. No acute events O/N    REVIEW OF SYSTEMS:  Constitutional: No fever, weight loss or fatigue  Cardiovascular: No chest pain, palpitations, dizziness or leg swelling  Gastrointestinal: No abdominal or epigastric pain. No nausea, vomiting or hematemesis; No diarrhea or constipation. No melena or hematochezia.  Skin: No itching, burning, rashes or lesions       MEDICATIONS:  MEDICATIONS  (STANDING):  pantoprazole  Injectable 40milliGRAM(s) IV Push daily  insulin lispro (HumaLOG) corrective regimen sliding scale  SubCutaneous Before meals and at bedtime  chlorhexidine 0.12% Liquid 15milliLiter(s) Swish and Spit two times a day  heparin  Injectable 5000Unit(s) SubCutaneous every 8 hours  fluconAZOLE IVPB 200milliGRAM(s) IV Intermittent every 24 hours  heparin  flush 100 Units/mL Injectable 100Unit(s) IV Push every other day  ALBUTerol/ipratropium for Nebulization 3milliLiter(s) Nebulizer every 6 hours  acetylcysteine 20% Inhalation 3milliLiter(s) Inhalation every 8 hours  imipenem/cilastatin  IVPB  IV Intermittent   imipenem/cilastatin  IVPB 500milliGRAM(s) IV Intermittent every 6 hours  sodium chloride 0.9% lock flush 20milliLiter(s) IV Push once  albumin human 25% IVPB 50milliLiter(s) IV Intermittent every 8 hours  furosemide   Injectable 40milliGRAM(s) IV Push every 6 hours    MEDICATIONS  (PRN):  acetaminophen    Suspension. 650milliGRAM(s) Oral every 6 hours PRN Mild Pain (1 - 3)  sodium chloride 0.9% lock flush 10milliLiter(s) IV Push every 1 hour PRN After each medication administration  sodium chloride 0.9% lock flush 10milliLiter(s) IV Push every 12 hours PRN Lumen of catheter NOT used  HYDROmorphone  Injectable 1milliGRAM(s) IV Push every 4 hours PRN Severe Pain (7 - 10)  HYDROmorphone  Injectable 0.5milliGRAM(s) IV Push every 4 hours PRN Moderate Pain (4 - 6)  acetaminophen    Suspension 650milliGRAM(s) Enteral Tube every 6 hours PRN For Temp greater than 38 C (100.4 F)      Allergies    sulfa drugs (Unknown)  sulfamethoxazole (Other)    Intolerances        Vital Signs Last 24 Hrs  T(C): 38.6, Max: 38.9 (12-17 @ 22:00)  T(F): 101.5, Max: 102 (12-17 @ 22:00)  HR: 90 (89 - 108)  BP: --  BP(mean): --  RR: 19 (13 - 103)  SpO2: 100% (95% - 100%)  I & Os for 24h ending 12-18 @ 07:00  =============================================  IN: 2082 ml / OUT: 2610 ml / NET: -528 ml    I & Os for current day (as of 12-18 @ 11:33)  =============================================  IN: 106 ml / OUT: 85 ml / NET: 21 ml      PHYSICAL EXAM:    General: Well developed; well nourished; in no acute distress  HEENT: MMM, conjunctiva and sclera clear  Gastrointestinal: Soft non-tender non-distended; Normal bowel sounds; No hepatosplenomegaly  Skin: Warm and dry. No obvious rash    LABS:  CBC Full  -  ( 18 Dec 2016 05:06 )  WBC Count : 7.6 K/uL  Hemoglobin : 6.4 g/dL  Hematocrit : 21.6 %  Platelet Count - Automated : 301 K/uL  Mean Cell Volume : 84.7 fL  Mean Cell Hemoglobin : 25.1 pg  Mean Cell Hemoglobin Concentration : 29.6 g/dL  Auto Neutrophil # : x  Auto Lymphocyte # : x  Auto Monocyte # : x  Auto Eosinophil # : x  Auto Basophil # : x  Auto Neutrophil % : 72.9 %  Auto Lymphocyte % : 15.1 %  Auto Monocyte % : 9.0 %  Auto Eosinophil % : 2.5 %  Auto Basophil % : 0.5 %    18 Dec 2016 05:06    146    |  107    |  17     ----------------------------<  107    4.4     |  29     |  0.54     Ca    8.4        18 Dec 2016 05:06  Phos  2.9       18 Dec 2016 05:06  Mg     2.3       18 Dec 2016 05:06    TPro  5.6    /  Alb  1.9    /  TBili  0.5    /  DBili  x      /  AST  37     /  ALT  34     /  AlkPhos  184    17 Dec 2016 05:32                      RADIOLOGY & ADDITIONAL STUDIES:

## 2016-12-18 NOTE — PROGRESS NOTE ADULT - SUBJECTIVE AND OBJECTIVE BOX
S: Febrile overnight. Urine output adequate. Trial of CPAP beginning morning of 12/18.    O:     Vital Signs Last 24 Hrs  T(C): 37.4, Max: 38.9 (12-17 @ 22:00)  T(F): 99.3, Max: 102 (12-17 @ 22:00)  HR: 90 (88 - 108)  BP: --  BP(mean): --  RR: 21 (14 - 21)  SpO2: 98% (95% - 100%)  I&O's Summary      Gen - NAD  Neuro - Alert, Arousable  CV - Tachycardic  Resp - CTABL, Vent  GI - Soft, ND, Appropriately TTP, Midline Vac, CHECO x 2   - Yu  Ext - Mild BLE Edema  Vasc -     ABG - ( 18 Dec 2016 04:59 )  pH: 7.47  /  pCO2: 44    /  pO2: 152   / HCO3: 31    / Base Excess: 6.7   /  SaO2: 99                                    6.4    7.6   )-----------( 301      ( 18 Dec 2016 05:06 )             21.6   18 Dec 2016 05:06    146    |  107    |  17     ----------------------------<  107    4.4     |  29     |  0.54     Ca    8.4        18 Dec 2016 05:06  Phos  2.9       18 Dec 2016 05:06  Mg     2.3       18 Dec 2016 05:06    TPro  5.6    /  Alb  1.9    /  TBili  0.5    /  DBili  x      /  AST  37     /  ALT  34     /  AlkPhos  184    17 Dec 2016 05:32  LIVER FUNCTIONS - ( 17 Dec 2016 05:32 )  Alb: 1.9 g/dL / Pro: 5.6 g/dL / ALK PHOS: 184 U/L / ALT: 34 U/L / AST: 37 U/L / GGT: x

## 2016-12-18 NOTE — PROVIDER CONTACT NOTE (CRITICAL VALUE NOTIFICATION) - ASSESSMENT
asymptomatic
/76, HR 72, 99% O2 Sat on vent (40% FiO2)
VSS asymptomatic
hemodynamically stable, asymptomatic

## 2016-12-18 NOTE — PROGRESS NOTE ADULT - ATTENDING COMMENTS
the patient was seen and examined.  I rounded on the patient twice.  I discussed the case with the resident.  I reviewed labs, meds, and CXR.  Plan outlined.  Continue on anabiotics.  CXR is stable and no need for bronchoscopy.  Pulmonary toilet.  ON tube feed.  Negative fluid balance.  Trach on Tuesday.  Negative fluid balance

## 2016-12-19 NOTE — BRIEF OPERATIVE NOTE - OPERATION/FINDINGS
Bronchoscope inserted through ETT. ETT noted to be in good position. Airway evaluation revealed Sharp Chrissie. JARVIS and LLL evaluation revealed thick white mucus from left main bronchus leading into the distal airways that was suctioned with difficulty. RUL, RML, RLL revealed only scant secretions. Bronchoscope then withdrawn from ETT. No bleeding noted. Therapeutic aspiration of secretions was performed.

## 2016-12-19 NOTE — PROGRESS NOTE ADULT - SUBJECTIVE AND OBJECTIVE BOX
Pt seen and examined. Minimal output from left CHECO drain. Right CHECO drain has about 100 cc output. Pt still spiking fevers to 101.0F. Patient currently on CPAP has done well in past days but optimizing patient as per respiratory and SICU team. Pt endorses pain over CHECO sites.     REVIEW OF SYSTEMS:  Constitutional: No fever, weight loss or fatigue  Cardiovascular: No chest pain, palpitations, dizziness or leg swelling  Gastrointestinal: + abdominal pain. No nausea, vomiting or hematemesis; No diarrhea or constipation. No melena or hematochezia.  Skin: No itching, burning, rashes or lesions       MEDICATIONS:  MEDICATIONS  (STANDING):  pantoprazole  Injectable 40milliGRAM(s) IV Push daily  insulin lispro (HumaLOG) corrective regimen sliding scale  SubCutaneous Before meals and at bedtime  chlorhexidine 0.12% Liquid 15milliLiter(s) Swish and Spit two times a day  heparin  Injectable 5000Unit(s) SubCutaneous every 8 hours  fluconAZOLE IVPB 200milliGRAM(s) IV Intermittent every 24 hours  heparin  flush 100 Units/mL Injectable 100Unit(s) IV Push every other day  ALBUTerol/ipratropium for Nebulization 3milliLiter(s) Nebulizer every 6 hours  acetylcysteine 20% Inhalation 3milliLiter(s) Inhalation every 8 hours  imipenem/cilastatin  IVPB  IV Intermittent   imipenem/cilastatin  IVPB 500milliGRAM(s) IV Intermittent every 6 hours  sodium chloride 0.9% lock flush 20milliLiter(s) IV Push once  albumin human 25% IVPB 50milliLiter(s) IV Intermittent every 8 hours  furosemide   Injectable 40milliGRAM(s) IV Push every 6 hours    MEDICATIONS  (PRN):  acetaminophen    Suspension. 650milliGRAM(s) Oral every 6 hours PRN Mild Pain (1 - 3)  sodium chloride 0.9% lock flush 10milliLiter(s) IV Push every 1 hour PRN After each medication administration  sodium chloride 0.9% lock flush 10milliLiter(s) IV Push every 12 hours PRN Lumen of catheter NOT used  HYDROmorphone  Injectable 1milliGRAM(s) IV Push every 4 hours PRN Severe Pain (7 - 10)  HYDROmorphone  Injectable 0.5milliGRAM(s) IV Push every 4 hours PRN Moderate Pain (4 - 6)  acetaminophen    Suspension 650milliGRAM(s) Enteral Tube every 6 hours PRN For Temp greater than 38 C (100.4 F)      Allergies    sulfa drugs (Unknown)  sulfamethoxazole (Other)    Intolerances        Vital Signs Last 24 Hrs  T(C): 38.6, Max: 38.9 (12-17 @ 22:00)  T(F): 101.5, Max: 102 (12-17 @ 22:00)  HR: 90 (89 - 108)  BP: --  BP(mean): --  RR: 19 (13 - 103)  SpO2: 100% (95% - 100%)  I & Os for 24h ending 12-18 @ 07:00  =============================================  IN: 2082 ml / OUT: 2610 ml / NET: -528 ml    I & Os for current day (as of 12-18 @ 11:33)  =============================================  IN: 106 ml / OUT: 85 ml / NET: 21 ml      PHYSICAL EXAM:    General: Well developed; well nourished; in no acute distress  HEENT: MMM, conjunctiva and sclera clear  Gastrointestinal: SoftDistended abdomen, TTP over CHECO insertion sites but not over upper epigastric area Normal bowel sounds; No hepatosplenomegaly  Skin: Warm and dry. No obvious rash    LABS:  CBC Full  -  ( 18 Dec 2016 05:06 )  WBC Count : 7.6 K/uL  Hemoglobin : 6.4 g/dL  Hematocrit : 21.6 %  Platelet Count - Automated : 301 K/uL  Mean Cell Volume : 84.7 fL  Mean Cell Hemoglobin : 25.1 pg  Mean Cell Hemoglobin Concentration : 29.6 g/dL  Auto Neutrophil # : x  Auto Lymphocyte # : x  Auto Monocyte # : x  Auto Eosinophil # : x  Auto Basophil # : x  Auto Neutrophil % : 72.9 %  Auto Lymphocyte % : 15.1 %  Auto Monocyte % : 9.0 %  Auto Eosinophil % : 2.5 %  Auto Basophil % : 0.5 %    18 Dec 2016 05:06    146    |  107    |  17     ----------------------------<  107    4.4     |  29     |  0.54     Ca    8.4        18 Dec 2016 05:06  Phos  2.9       18 Dec 2016 05:06  Mg     2.3       18 Dec 2016 05:06    TPro  5.6    /  Alb  1.9    /  TBili  0.5    /  DBili  x      /  AST  37     /  ALT  34     /  AlkPhos  184    17 Dec 2016 05:32    CT abdomen 12/11/16   Interval surgery and placement of endoluminal stent extending from the   distal esophagus to jejunal loop distal to the gastrojejunal anastomosis.   Limited evaluation for leakage without oral contrast.    Small to moderate size ascites, decreased since prior study. Largest   pocket of ascites measuring 14 x 5 cm in the upper pelvis as described   above. Minimal free air, decreased since prior study.     Prominent enhancement of several small bowel loops in the lower abdomen   and upper pelvis could be reactive. Peritonitis is not excluded.     Small bilateral pleural effusions, similar to the prior study.   Consolidations in the lung bases is likely atelectatic in nature.

## 2016-12-19 NOTE — PROGRESS NOTE ADULT - SUBJECTIVE AND OBJECTIVE BOX
S: BRAYDENO.  Patient reports    O: Vitals: Vital Signs Last 24 Hrs  T(C): 38.4, Max: 38.6 (12-18 @ 10:30)  T(F): 101.1, Max: 101.5 (12-18 @ 10:30)  HR: 89 (88 - 98)  BP: --  BP(mean): --  RR: 15 (14 - 24)  SpO2: 99% (96% - 100%)    CAPILLARY BLOOD GLUCOSE  111 (19 Dec 2016 06:01)  114 (18 Dec 2016 22:00)  107 (18 Dec 2016 16:00)  150 (18 Dec 2016 12:00)      PHYSICAL EXAM:  Neuro: A&Ox3, NAD, grossly neuro intact  CV: RRR, no MRGs  Pulm: CTAB, no wheezes, rales ronchi  Abd: BS (+), Soft, NT, ND  Ext: No edema peripherally or centrally  Vasc: 2+ pulses - radial and PT      I & Os for 24h ending 12-19 @ 07:00  =============================================  IN: 1966 ml / OUT: 4654 ml / NET: -2688 ml    I & Os for current day (as of 12-19 @ 08:34)  =============================================  IN: 0 ml / OUT: 30 ml / NET: -30 ml      Labs:   ABG - ( 18 Dec 2016 04:59 )  pH: 7.47  /  pCO2: 44    /  pO2: 152   / HCO3: 31    / Base Excess: 6.7   /  SaO2: 99              Mode: AC/ CMV (Assist Control/ Continuous Mandatory Ventilation), RR (machine): 14, TV (machine): 400, FiO2: 50, PEEP: 6, PIP: 19                          7.7    7.6   )-----------( 306      ( 19 Dec 2016 05:35 )             25.0   19 Dec 2016 05:35    143    |  102    |  18     ----------------------------<  130    3.4     |  29     |  0.53     Ca    8.8        19 Dec 2016 05:35  Phos  2.2       19 Dec 2016 05:35  Mg     2.1       19 Dec 2016 05:35          RADIOLOGY & ADDITIONAL STUDIES: S: NAEO.  Still intubated.  Arousable and alert.  Patient denies any current complaints.  Bronched today- revealed excessive secretions.  Plan for trach placement tomorrow.     O: Vitals: Vital Signs Last 24 Hrs  T(C): 38.4, Max: 38.6 (12-18 @ 10:30)  T(F): 101.1, Max: 101.5 (12-18 @ 10:30)  HR: 89 (88 - 98)  BP: --  BP(mean): --  RR: 15 (14 - 24)  SpO2: 99% (96% - 100%)    CAPILLARY BLOOD GLUCOSE  111 (19 Dec 2016 06:01)  114 (18 Dec 2016 22:00)  107 (18 Dec 2016 16:00)  150 (18 Dec 2016 12:00)      PHYSICAL EXAM:  Neuro: arousable, alert & following commands, NAD, intubated  CV: Regular rhythm, tachycardic, +S1S2, no m/r/g  Pulm: Diminished breath sounds RLL & LLL, CTA RUL/JARVIS,  intubated on AC  Abdominal: soft, ND,  R&L JPs with dark green output, midline vac functioning normally  Ext: 1+ edema b/l LE, warm, no c/c, +SCDs  Vasc: 2+ DP/PT b/l    I & Os for 24h ending 12-19 @ 07:00  =============================================  IN: 1966 ml / OUT: 4654 ml / NET: -2688 ml    I & Os for current day (as of 12-19 @ 08:34)  =============================================  IN: 0 ml / OUT: 30 ml / NET: -30 ml      Labs:   ABG - ( 18 Dec 2016 04:59 )  pH: 7.47  /  pCO2: 44    /  pO2: 152   / HCO3: 31    / Base Excess: 6.7   /  SaO2: 99        Mode: AC/ CMV (Assist Control/ Continuous Mandatory Ventilation), RR (machine): 14, TV (machine): 400, FiO2: 50, PEEP: 6, PIP: 19               7.7    7.6   )-----------( 306      ( 19 Dec 2016 05:35 )             25.0   19 Dec 2016 05:35    143    |  102    |  18     ----------------------------<  130    3.4     |  29     |  0.53     Ca    8.8        19 Dec 2016 05:35  Phos  2.2       19 Dec 2016 05:35  Mg     2.1       19 Dec 2016 05:35      RADIOLOGY & ADDITIONAL STUDIES:    EXAM:  XR CHEST 1 VIEW PORT ROUTINE                        PROCEDURE DATE:  12/18/2016         INTERPRETATION:  Clinical History: Evaluate lungs    Portable examination the chest demonstrates limited inspiration.   Bilateral effusions left greater than right. Underlying infiltrates   cannot be excluded. No interval change position remaining support devices   in comparison to prior examination of the chest 12/17/2016    Impression: Limited inspiration. Bilateral effusions left greaterthan   right. Underlying infiltrates cannot be excluded

## 2016-12-19 NOTE — PROGRESS NOTE ADULT - ASSESSMENT
56 year old critical patient s/p SIPs procedure with free air concerning for anastomotic leak s/p emergent ex lap with abdominal washout found to have leak of esophagojejunostomy which they could not repair due to its location.  Patient s/p esophageal stent placement, peritoneal washout and internal double pigtail stent placement. During endoscopy second defect noted on enteric side of anastomosis. Patient also with PNA, improving WBC.  H/H downtrending; no e/o GI bleeding currently    - Cont IV abx, if still spiking fevers would consider repeat CT abdomen to assess for new abdominal collections vs source of fevers.   - Strict NPO to ensure no stent migration and healing of defect   - cont J tube feeds for nutrition  - Plan to repeat EGD on Wed 12/21/16 to assess healing of defect vs placement of internal vac for healing vs replacement of esophageal stent.   - Monitor CHECO drain output  - Care per SICU team, extubation per team.

## 2016-12-19 NOTE — PROVIDER CONTACT NOTE (CHANGE IN STATUS NOTIFICATION) - ASSESSMENT
Large secretions on left lung as per Dr. Harper. Bronchoscopy completed at 2:05p. Attempt extubation post bronch aborted due to increased left lung secretions.

## 2016-12-19 NOTE — PROGRESS NOTE ADULT - ATTENDING COMMENTS
the patient was seen and examined.  I rounded on the patient twice.  I discussed the case with the resident.  I reviewed labs, meds, and CXR.  Plan outlined.  Trach tomorrow.  She had copious secretions in the lower lobes.  Continue PSV.  On tube feed.  US revealed small pleural effusion on right

## 2016-12-19 NOTE — PROGRESS NOTE ADULT - SUBJECTIVE AND OBJECTIVE BOX
patient remains intubated with good gas but with multiple secretions  does get temperature spikes and does have normal wbc but there are bands  internal drains placed by dr sotomayor  will take the drains off suction and repeat the ct scan  bun cr are fine  on j tube feeds  and plan is traceostomy tomorrow and I discussed with  last week  tach sceduled for tomorrow  will repeat ct scan as has been 8 days since last scan and then decide when to take external drains out

## 2016-12-19 NOTE — PROGRESS NOTE ADULT - ASSESSMENT
56YOF s/p bypass revision following failed SIPS (anastamotic leak) now with fevers and draining intra-abdominal abscess.     Neuro: Dilaudid PRN, Tylenol PRN  CVS: MAP > 70, normotensive goals.   Pulm: /60/12/5. Peridex. Duonebs. Mucomyst.  FEN/GI: NPO. J-tube TF to goal. Protonix.  : Yu. Post-op ATN - Lasix PRN.  Endo: ISS.   ID: MDR Pseudomonas (sputum) - Imipenem (12/14--), fluconazole (12/4--). \\ DC Vanc (12/13-14), Zosyn (12/3-14).   Heme: SCDs, SQH.  Lines/drains: R PICC/midline //D/C Left IJ TLC (12/3--?), left IJ Juanjo (12/6-12/14). CHECO x 2. Feeding J-tube.  Wounds: Midline abdominal wound - wound vac (change MW).

## 2016-12-19 NOTE — PROVIDER CONTACT NOTE (CHANGE IN STATUS NOTIFICATION) - BACKGROUND
Pt bronched at bedside secondary to prior attempted extubation and desaturation. Versed 2mg IVP given at 2pm.

## 2016-12-20 NOTE — PROGRESS NOTE ADULT - SUBJECTIVE AND OBJECTIVE BOX
still having temp spikes  looks unchanged  drainage is minimal   repeat ct scan today  also for trach and hopefully will work with staff to allow for decanulation  gas is fine  also will help with secretions  following ct will begin to remove drains  and for repeat endoscopy and possible stent removal

## 2016-12-20 NOTE — PROGRESS NOTE ADULT - ATTENDING COMMENTS
patient was seen and examined. I rounded on the patient twice during the day. I discussed the case in detail with the surgical resident. I reviewed labs meds and chest x-ray. Plan as outlined.  patient had a tracheostomy done successfully. Continue current medication. Continue antibiotic. Follow repeat cultures. Will reevaluate in the morning for weaning trial

## 2016-12-20 NOTE — PROGRESS NOTE ADULT - SUBJECTIVE AND OBJECTIVE BOX
S: NAEO.  Patient underwent trach placement this AM without issue.   Plan for CT C/A/P and wound vac change.    O: Vitals: Vital Signs Last 24 Hrs  T(C): 38.1, Max: 38.7 (12-20 @ 09:00)  T(F): 100.6, Max: 101.7 (12-20 @ 09:00)  HR: 87 (87 - 111)  BP: 100/73 (100/73 - 160/86)  BP(mean): 84 (84 - 111)  RR: 10 (10 - 24)  SpO2: 100% (90% - 100%)    CAPILLARY BLOOD GLUCOSE  92 (20 Dec 2016 19:00)  96 (20 Dec 2016 12:02)  103 (20 Dec 2016 06:00)  112 (19 Dec 2016 22:00)      PHYSICAL EXAM:  Neuro: arousable, alert & following commands, NAD, intubated  CV: Regular rhythm, tachycardic, +S1S2, no m/r/g  Pulm: Diminished breath sounds RLL & LLL, CTA RUL/JARVIS,  intubated on AC  Abdominal: soft, ND,  R&L JPs with dark green output, midline vac functioning normally  Ext: 1+ edema b/l LE, warm, no c/c, +SCDs  Vasc: 2+ DP/PT b/l      I & Os for 24h ending 12-20 @ 07:00  =============================================  IN: 2065.8 ml / OUT: 4220 ml / NET: -2154.2 ml    I & Os for current day (as of 12-20 @ 19:24)  =============================================  IN: 1503 ml / OUT: 556 ml / NET: 947 ml      Labs:   Mode: AC/ CMV (Assist Control/ Continuous Mandatory Ventilation), RR (machine): 14, TV (machine): 400, FiO2: 50, PEEP: 6, PIP: 19                          8.2    10.7  )-----------( 289      ( 20 Dec 2016 04:39 )             26.9   20 Dec 2016 04:39    144    |  102    |  17     ----------------------------<  97     4.2     |  33     |  0.54     Ca    9.4        20 Dec 2016 04:39  Phos  2.6       20 Dec 2016 04:39  Mg     2.1       20 Dec 2016 04:39    PT/INR - ( 20 Dec 2016 04:39 )   PT: 15.8 sec;   INR: 1.42          PTT - ( 20 Dec 2016 04:39 )  PTT:59.8 sec      RADIOLOGY & ADDITIONAL STUDIES:

## 2016-12-20 NOTE — PROGRESS NOTE ADULT - SUBJECTIVE AND OBJECTIVE BOX
56 year old female with R sided double lumen PICC line placed 12/16, now with R axillary vein and R subclavian vein DVT.  PICC tip only able to be advanced to R subclavian vein at time of procedure. PICC line examined at bedside, both ports flush with no blood return, minimal swelling with no redness around PICC insertion site. OK to continue use of R PICC line for IV medication. No need to replace at this time.

## 2016-12-20 NOTE — BRIEF OPERATIVE NOTE - OPERATION/FINDINGS
Bronchoscopic : Lian  		  Indication: Vent Dependent Respiratory Failure    Report:   The procedure was initiated at the bedside. A time-out was performed, verifying the patient, surgical site and procedure. Patient’s neck was positioned with a towel roll behind his shoulder blades. This allowed for mild extension of the cervical neck. The neck was then prepped with chlorhexidine solution. The area was then draped in a sterile fashion. Attention was directed at the midline trachea, where the cricothyroid membrane was palpated. Approximately two finger-lengths above the sternal notch, a midline vertical incision was created with a scalpel. With Bronchoscopic assistance, The Blue Rhino Kit was used. The needle was visualized into the trachea and sheath was left in place, a guidewire was introduced with the seldinger technique. Dilation was performed with the tracheal ring dilator and Large dilator. A #8 Shiley tracheostomy was then inserted over the guidewire and sheath.. The Ventilator was then connected and Bronchoscopy was visualized in the correct position. Please see separate Bronchoscopy report. The Tracheostomy was then sutured into place and trach ties were applied. Dr. Gandhi was present during the entire procedure.    Complications: None    Estimated Blood Loss: 10cc Bronchoscopic : Lian  		  Indication: Vent Dependent Respiratory Failure    Report:   The procedure was initiated at the bedside. A time-out was performed, verifying the patient, surgical site and procedure. Patient’s neck was extended by placing a towel roll between her shoulder blades. The neck was then prepped with chlorhexidine solution and draped in a sterile fashion. Attention was directed at the midline trachea, where the cricothyroid membrane was palpated. Approximately two finger-lengths above the sternal notch, a midline vertical incision was created with a scalpel. With Bronchoscopic assistance, The Blue Rhino Kit was used to visualize the needle passing into the trachea.  The needle was removed, leaving the sheath in place, and a guidewire was introduced into the sheath.  Using a modified Seldinger technique, the tracheal punch, tracheal dilator, and #8 Shiley tracheostomy tube were sequentially passed over the wire under bronchoscopic visualization.  The Ventilator was then connected and Bronchoscopy was visualized in the correct position. Please see separate Bronchoscopy report. The Tracheostomy was then sutured into place and trach ties were applied. Dr. Gandhi was present during the entire procedure.    Complications: None    Estimated Blood Loss: 10cc

## 2016-12-20 NOTE — BRIEF OPERATIVE NOTE - OPERATION/FINDINGS
Bronchoscope inserted through ETT. ETT noted to be in good position. Airway evaluation revealed Sharp Chrissie. JARVIS and LLL evaluation revealed thick white mucus from left main bronchus leading into the distal airways that was suctioned with difficulty. RUL, RML, RLL revealed only scant secretions. . Therapeutic aspiration of secretions was performed. Percutaneous tracheostomy was performed under bronchoscopic guidance ( see separate procedure note). Tracheostomy placement was confirmed by passing the bronchoscope through the tracheostomy, minimal bleeding was noted and suctioned easily.

## 2016-12-20 NOTE — PROGRESS NOTE ADULT - ASSESSMENT
56 year old critical patient s/p SIPs procedure with free air concerning for anastomotic leak s/p emergent ex lap with abdominal washout found to have leak of esophagojejunostomy which they could not repair due to its location.  Patient s/p esophageal stent placement, peritoneal washout and internal double pigtail stent placement. During endoscopy second defect noted on enteric side of anastomosis. Patient also with PNA. Pt having trach palced at bedside this morning.   Plan discussed with SICU team.   - Cont IV abx  - CT abdomen being ordered for today to assess status of fluid collections and possible source of fevers   - Strict NPO to ensure no stent migration and healing of defect   - cont J tube feeds for nutrition  - Plan is to repeat EGD janeen 12/21; will remove esophageal stent and assess healing of defect. If defect closing will conisder replacing esophageal stent and allow for healing by second intention. If defect still patent will place and internal vac. Discussed with surgical team that will need surgical sponge, NG tube, suture and wound vac suction. They have noted that everything can be ordered.   - Monitor CHECO drain output  - Care per SICU team, extubation per team.

## 2016-12-20 NOTE — PROGRESS NOTE ADULT - SUBJECTIVE AND OBJECTIVE BOX
Pt seen and examined. Minimal output from left CHECO drain. Right CHECO drain has about 30 cc output. When pt went to be seen patient was having tracheostomy placed. Pt Left CHECO drain has minimal output of 5 cc.     REVIEW OF SYSTEMS:  Constitutional: No fever, weight loss or fatigue  Cardiovascular: No chest pain, palpitations, dizziness or leg swelling  Gastrointestinal: + abdominal pain. No nausea, vomiting or hematemesis; No diarrhea or constipation. No melena or hematochezia.  Skin: No itching, burning, rashes or lesions       MEDICATIONS:  MEDICATIONS  (STANDING):  pantoprazole  Injectable 40milliGRAM(s) IV Push daily  insulin lispro (HumaLOG) corrective regimen sliding scale  SubCutaneous Before meals and at bedtime  chlorhexidine 0.12% Liquid 15milliLiter(s) Swish and Spit two times a day  heparin  Injectable 5000Unit(s) SubCutaneous every 8 hours  fluconAZOLE IVPB 200milliGRAM(s) IV Intermittent every 24 hours  heparin  flush 100 Units/mL Injectable 100Unit(s) IV Push every other day  ALBUTerol/ipratropium for Nebulization 3milliLiter(s) Nebulizer every 6 hours  acetylcysteine 20% Inhalation 3milliLiter(s) Inhalation every 8 hours  imipenem/cilastatin  IVPB  IV Intermittent   imipenem/cilastatin  IVPB 500milliGRAM(s) IV Intermittent every 6 hours  sodium chloride 0.9% lock flush 20milliLiter(s) IV Push once  albumin human 25% IVPB 50milliLiter(s) IV Intermittent every 8 hours  furosemide   Injectable 40milliGRAM(s) IV Push every 6 hours    MEDICATIONS  (PRN):  acetaminophen    Suspension. 650milliGRAM(s) Oral every 6 hours PRN Mild Pain (1 - 3)  sodium chloride 0.9% lock flush 10milliLiter(s) IV Push every 1 hour PRN After each medication administration  sodium chloride 0.9% lock flush 10milliLiter(s) IV Push every 12 hours PRN Lumen of catheter NOT used  HYDROmorphone  Injectable 1milliGRAM(s) IV Push every 4 hours PRN Severe Pain (7 - 10)  HYDROmorphone  Injectable 0.5milliGRAM(s) IV Push every 4 hours PRN Moderate Pain (4 - 6)  acetaminophen    Suspension 650milliGRAM(s) Enteral Tube every 6 hours PRN For Temp greater than 38 C (100.4 F)      Allergies    sulfa drugs (Unknown)  sulfamethoxazole (Other)    Intolerances        Vital Signs Last 24 Hrs  T(C): 38.6, Max: 38.9 (12-17 @ 22:00)  T(F): 101.5, Max: 102 (12-17 @ 22:00)  HR: 90 (89 - 108)  BP: --  BP(mean): --  RR: 19 (13 - 103)  SpO2: 100% (95% - 100%)  I & Os for 24h ending 12-18 @ 07:00  =============================================  IN: 2082 ml / OUT: 2610 ml / NET: -528 ml    I & Os for current day (as of 12-18 @ 11:33)  =============================================  IN: 106 ml / OUT: 85 ml / NET: 21 ml      PHYSICAL EXAM:    General: Well developed; well nourished; in no acute distress  HEENT: MMM, conjunctiva and sclera clear  Gastrointestinal: Soft Distended abdomen, TTP over CHECO insertion sites but not over upper epigastric area Normal bowel sounds; No hepatosplenomegaly  Skin: Warm and dry. No obvious rash    LABS:  CBC Full  -  ( 20 Dec 2016 05:06 )  WBC Count : 10.7 K/uL  Hemoglobin : 8.2 g/dL  Hematocrit : 26.9 %  Platelet Count - Automated : 289 K/uL  Mean Cell Volume : 84.7 fL  Mean Cell Hemoglobin : 25.1 pg  Mean Cell Hemoglobin Concentration : 29.6 g/dL  Auto Neutrophil # : x  Auto Lymphocyte # : x  Auto Monocyte # : x  Auto Eosinophil # : x  Auto Basophil # : x  Auto Neutrophil % : 68.4 %  Auto Lymphocyte % : 17.8 %  Auto Monocyte % : 9.0 %  Auto Eosinophil % : 2.5 %  Auto Basophil % : 0.5 %    18 Dec 2016 05:06    144    |  102   |  17     ----------------------------<  107    4.2     |  33     |  0.54     Ca    8.4        18 Dec 2016 05:06  Phos  2.9       18 Dec 2016 05:06  Mg     2.3       18 Dec 2016 05:06    TPro  5.6    /  Alb  1.9    /  TBili  0.5    /  DBili  x      /  AST  37     /  ALT  34     /  AlkPhos  184    17 Dec 2016 05:32    CT abdomen 12/11/16   Interval surgery and placement of endoluminal stent extending from the   distal esophagus to jejunal loop distal to the gastrojejunal anastomosis.   Limited evaluation for leakage without oral contrast.    Small to moderate size ascites, decreased since prior study. Largest   pocket of ascites measuring 14 x 5 cm in the upper pelvis as described   above. Minimal free air, decreased since prior study.     Prominent enhancement of several small bowel loops in the lower abdomen   and upper pelvis could be reactive. Peritonitis is not excluded.     Small bilateral pleural effusions, similar to the prior study.   Consolidations in the lung bases is likely atelectatic in nature.

## 2016-12-20 NOTE — PROGRESS NOTE ADULT - ASSESSMENT
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     Neuro: Dilaudid PRN, Tylenol PRN, Lexapro 20mg daily   CVS: MAP > 70, normotensive goals.   Pulm: Trach /60/12/5. Peridex. Duonebs. Mucomyst.  FEN/GI: NPO. J-tube TF to goal. Protonix. Alb   : Yu. Post-op ATN - Lasix PRN.  Endo: ISS.   ID: MDR Pseudomonas (sputum) - Imipenem (12/14--), fluconazole (12/4--). Bronch wash 12/20- H. simplex Acyclovir (12/20--) \\ DC Vanc (12/13-14), Zosyn (12/3-14).   Heme: SCDs, Heparin gtt  Lines/drains: Left IJ TLC (12/3--), left IJ Juanjo (12/6-12/14). CHECO x 2. Feeding J-tube.  Wounds: Midline abdominal wound - wound vac (change MW).

## 2016-12-21 NOTE — PROGRESS NOTE ADULT - ATTENDING COMMENTS
The patient was seen and examined. I discussed the case in details surgical resident. I reviewed all data related to her clinical status. I rounded on the patient twice. Plan is out lined. I discussed with the family. Patient had tracheostomy until rate procedure very well. Patient had cultures secretion the left lung. Follow repeat cultures. The continue antibiotic. I evaluated the wound. Follow surgery. Patient had upper endoscopy and evaluation for the stent

## 2016-12-21 NOTE — PROGRESS NOTE ADULT - ASSESSMENT
56YOF s/p bypass revision following failed SIPS (anastamotic leak) now with fevers and draining intra-abdominal abscess.     Neuro: Dilaudid PRN, Tylenol PRN, Lexapro 20mg daily   CVS: MAP > 70, normotensive goals.   Pulm: Trach /60/12/5. Peridex. Duonebs. Mucomyst.  FEN/GI: NPO. J-tube TF to goal. Protonix. Alb   : Yu. Post-op ATN - Lasix PRN.  Endo: ISS.   ID: MDR Pseudomonas (sputum) - Imipenem (12/14--), fluconazole (12/4--). Bronch wash 12/20- H. simplex Acyclovir (12/20--) \\ DC Vanc (12/13-14), Zosyn (12/3-14).   Heme: SCDs, Heparin gtt  Lines/drains: Left IJ TLC (12/3--), left IJ Juanjo (12/6-12/14). CHECO x 2. Feeding J-tube.  Wounds: Midline abdominal wound - wound vac (change MWF).

## 2016-12-21 NOTE — ADVANCED PRACTICE NURSE CONSULT - ASSESSMENT
Patient with pressure ulcers (injuries) of sacrum, right buttock and b/l posterior thighs.   Sacral DTI extending to b/l upper buttocks measuring 7 cm x 7 cm.  Right buttocks stage 2 pressure ulcer with pale red wound base, draining small amount of serosanguinous exudate measuring 6 cm x 3.5 cm x 0.1 cm.  Right posterior thigh stage 2 pressure ulcer with pale red wound base, draining small amount of serosanguinous exudate measuring 3.5 cm x 4 cm x 0.1 cm.  Left posterior thigh stage 2 pressure ulcer with pale red wound base, draining small amount of serosanguinous exudate measuring 2.5 cm x 4 cm x 0.1 cm.  Slightly denuded skin noted beneath pannus.  Patient incontinent of watery, brown stool.   RNMulu, present during assessment.  Placed patient on Air TAP positioning system.

## 2016-12-21 NOTE — PROGRESS NOTE ADULT - SUBJECTIVE AND OBJECTIVE BOX
S: NAEO.  Tolerating trach well.   GI re-scoped, removed old stent, and placed new stent sutured in place.    O: Vitals: Vital Signs Last 24 Hrs  T(C): 37.8, Max: 37.8 (12-21 @ 14:00)  T(F): 100.1, Max: 100.1 (12-21 @ 18:20)  HR: 90 (76 - 100)  BP: 150/82 (102/74 - 188/100)  BP(mean): 101 (83 - 139)  RR: 18 (12 - 26)  SpO2: 99% (94% - 100%)    CAPILLARY BLOOD GLUCOSE  98 (21 Dec 2016 16:40)  102 (21 Dec 2016 11:00)      PHYSICAL EXAM:  Neuro: arousable, alert & following commands, NAD, intubated.  CV: Regular rhythm, tachycardic, +S1S2, no m/r/g.  Pulm: Diminished breath sounds bibasilar lungs, clear R&L UL.   Abdominal: soft, ND,  R&L JPs with opaque light-colored output, midline wound down to fascia, sutures visible- packed with WTD dressing  Ext: 1+ edema b/l LE, warm, no c/c, +SCDs  Vasc: 2+ DP/PT b/l      I & Os for 24h ending 12-21 @ 07:00  =============================================  IN: 2679 ml / OUT: 1061 ml / NET: 1618 ml    I & Os for current day (as of 12-21 @ 20:27)  =============================================  IN: 1781 ml / OUT: 378 ml / NET: 1403 ml      Labs:   Mode: AC/ CMV (Assist Control/ Continuous Mandatory Ventilation), RR (machine): 14, TV (machine): 400, FiO2: 50, PEEP: 6, PIP: 19                          8.9    8.6   )-----------( 262      ( 21 Dec 2016 14:19 )             28.8   21 Dec 2016 05:16    141    |  101    |  19     ----------------------------<  104    3.6     |  32     |  0.56     Ca    8.6        21 Dec 2016 05:16  Phos  3.3       21 Dec 2016 05:16  Mg     2.1       21 Dec 2016 05:16    PT/INR - ( 21 Dec 2016 14:19 )   PT: 15.1 sec;   INR: 1.36          PTT - ( 21 Dec 2016 14:19 )  PTT:27.7 sec      RADIOLOGY & ADDITIONAL STUDIES:  EXAM:  XR CHEST 1 VIEW PORT URGENT                           PROCEDURE DATE:  12/21/2016                 INTERPRETATION:  Resident preliminary report    CLINICAL INFORMATION: s/p esophageal stent placement.    TECHNIQUE:A frontal view of the chest is dated 12/21/2016 7:45 PM     COMPARISON: Chest x-ray 12/21/2016 6:49 AM     FINDINGS: Patient is status post tracheostomy. There has been interval   change in location of the esophageal stent which is now seen overlying  the GE junction. Persistent left-sided pleural effusion. No pneumothorax.    IMPRESSION: Interval change in position of the esophageal stent which now   overlies the GE junction.

## 2016-12-21 NOTE — ADVANCED PRACTICE NURSE CONSULT - RECOMMEDATIONS
Cleanse wounds with wound cleanser, apply zinc based moisture barrier cream to pressure ulcers (injuries) twice daily and prn if soiled or wet.   Apply InterDry beneath pannus every 3 days and prn if soiled or wet.   Insert rectal tube to allow pressure ulcers to heal.   Discussed assessment and recommendations with RN, Mulu and house staff.

## 2016-12-21 NOTE — ADVANCED PRACTICE NURSE CONSULT - REASON FOR CONSULT
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 Patient s/p lysis of adhesions. RNY gastric bypass, resection remanent stomach EG 11/28/16.

## 2016-12-21 NOTE — PROGRESS NOTE ADULT - SUBJECTIVE AND OBJECTIVE BOX
Looks much better with trach and optimistic that we can go to collar  wbc is normal  ct done and I reviewed the stent is high and I have asked dr sotomayor to remove today  the fluid is mainly gone the drains are in an air pocket that is probably from last endoscopy  I do not see extravastion and there is good passage to small bowel  the wound was checked and there is granulation and will use wet to dry dressing and occusive dressing  I am away after today but will keep check  dr carbajal and miles covering  on J tube feeds  hope to move to collar soon   remove stent   keep pigtails then d/c external drains  will endoscope today  on anticoagulation for upper extremity dvt

## 2016-12-22 NOTE — PROGRESS NOTE ADULT - SUBJECTIVE AND OBJECTIVE BOX
Interval Events:  Patient seen and examined at bedside.      Allergies    sulfa drugs (Unknown)  sulfamethoxazole (Other)    Intolerances        Vital Signs Last 24 Hrs  T(C): 38.9, Max: 38.9 ( @ 10:04)  T(F): 102, Max: 102 ( @ 10:04)  HR: 107 (76 - 108)  BP: 148/77 (99/49 - 188/100)  BP(mean): 102 (67 - 139)  RR: 24 (12 - 36)  SpO2: 97% (94% - 100%)  I & Os for 24h ending  @ 07:00  =============================================  IN: 2614 ml / OUT: 813 ml / NET: 1801 ml    I & Os for current day (as of  @ 14:54)  =============================================  IN: 1049 ml / OUT: 335 ml / NET: 714 ml  I & Os for 24h ending  @ 07:00  =============================================  IN: 2614 ml / OUT: 813 ml / NET: 1801 ml    I & Os for current day (as of  @ 14:54)  =============================================  IN: 1049 ml / OUT: 335 ml / NET: 714 ml        LABS:      CBC Full  -  ( 22 Dec 2016 07:06 )  WBC Count : 9.7 K/uL  Hemoglobin : 10.2 g/dL  Hematocrit : 33.8 %  Platelet Count - Automated : 312 K/uL  Mean Cell Volume : 84.7 fL  Mean Cell Hemoglobin : 25.6 pg  Mean Cell Hemoglobin Concentration : 30.2 g/dL  Auto Neutrophil # : x  Auto Lymphocyte # : x  Auto Monocyte # : x  Auto Eosinophil # : x  Auto Basophil # : x  Auto Neutrophil % : x  Auto Lymphocyte % : x  Auto Monocyte % : x  Auto Eosinophil % : x  Auto Basophil % : x    22 Dec 2016 07:06    141    |  101    |  16     ----------------------------<  123    3.8     |  31     |  0.50     Ca    9.0        22 Dec 2016 07:06  Phos  2.5       22 Dec 2016 07:06  Mg     2.4       22 Dec 2016 07:06    TPro  7.0    /  Alb  2.5    /  TBili  0.8    /  DBili  0.39   /  AST  43     /  ALT  33     /  AlkPhos  188    22 Dec 2016 07:06    PT/INR - ( 22 Dec 2016 07:06 )   PT: 14.2 sec;   INR: 1.28          PTT - ( 22 Dec 2016 07:06 )  PTT:27.9 sec      Urinalysis Basic - ( 22 Dec 2016 09:44 )    Color: Criselda / Appearance: Clear / S.025 / pH: x  Gluc: x / Ketone: Trace mg/dL  / Bili: Small / Urobili: 4.0 E.U./dL   Blood: x / Protein: 30 mg/dL / Nitrite: POSITIVE   Leuk Esterase: NEGATIVE / RBC: 5-10 /HPF / WBC < 5 /HPF   Sq Epi: x / Non Sq Epi: Few /HPF / Bacteria: Present /HPF        RADIOLOGY & ADDITIONAL STUDIES (The following images were personally reviewed):      Physical Exam:     Neuro: A&OX3 No deficits   Pulm: CTA B/L   Abd: Soft NT nd  Ext: No C/C/E Ext wwp + dp    A/p: 56yFemale Interval Events: BRAYDEN    Patient seen and examined at bedside.       Allergies    sulfa drugs (Unknown)  sulfamethoxazole (Other)    Intolerances        Vital Signs Last 24 Hrs  T(C): 38.9, Max: 38.9 ( @ 10:04)  T(F): 102, Max: 102 ( @ 10:04)  HR: 107 (76 - 108)  BP: 148/77 (99/49 - 188/100)  BP(mean): 102 (67 - 139)  RR: 24 (12 - 36)  SpO2: 97% (94% - 100%)  I & Os for 24h ending  @ 07:00  =============================================  IN: 2614 ml / OUT: 813 ml / NET: 1801 ml    I & Os for current day (as of  @ 14:54)  =============================================  IN: 1049 ml / OUT: 335 ml / NET: 714 ml  I & Os for 24h ending  @ 07:00  =============================================  IN: 2614 ml / OUT: 813 ml / NET: 1801 ml    I & Os for current day (as of  @ 14:54)  =============================================  IN: 1049 ml / OUT: 335 ml / NET: 714 ml        LABS:      CBC Full  -  ( 22 Dec 2016 07:06 )  WBC Count : 9.7 K/uL  Hemoglobin : 10.2 g/dL  Hematocrit : 33.8 %  Platelet Count - Automated : 312 K/uL  Mean Cell Volume : 84.7 fL  Mean Cell Hemoglobin : 25.6 pg  Mean Cell Hemoglobin Concentration : 30.2 g/dL  Auto Neutrophil # : x  Auto Lymphocyte # : x  Auto Monocyte # : x  Auto Eosinophil # : x  Auto Basophil # : x  Auto Neutrophil % : x  Auto Lymphocyte % : x  Auto Monocyte % : x  Auto Eosinophil % : x  Auto Basophil % : x    22 Dec 2016 07:06    141    |  101    |  16     ----------------------------<  123    3.8     |  31     |  0.50     Ca    9.0        22 Dec 2016 07:06  Phos  2.5       22 Dec 2016 07:06  Mg     2.4       22 Dec 2016 07:06    TPro  7.0    /  Alb  2.5    /  TBili  0.8    /  DBili  0.39   /  AST  43     /  ALT  33     /  AlkPhos  188    22 Dec 2016 07:06    PT/INR - ( 22 Dec 2016 07:06 )   PT: 14.2 sec;   INR: 1.28          PTT - ( 22 Dec 2016 07:06 )  PTT:27.9 sec      Urinalysis Basic - ( 22 Dec 2016 09:44 )    Color: Criselda / Appearance: Clear / S.025 / pH: x  Gluc: x / Ketone: Trace mg/dL  / Bili: Small / Urobili: 4.0 E.U./dL   Blood: x / Protein: 30 mg/dL / Nitrite: POSITIVE   Leuk Esterase: NEGATIVE / RBC: 5-10 /HPF / WBC < 5 /HPF   Sq Epi: x / Non Sq Epi: Few /HPF / Bacteria: Present /HPF        RADIOLOGY & ADDITIONAL STUDIES (The following images were personally reviewed):      Physical Exam:     Neuro: A&OX3 No deficits   Pulm: CTA B/L   Abd: Soft NT nd  Ext: No C/C/E Ext wwp + dp    A/p: 56yFemale Interval Events: GI placed new stent yesterday.     Patient seen and examined at bedside. Pt alert on trach collar. Denies abdominal pain, chest pain, N/V.       Allergies    sulfa drugs (Unknown)  sulfamethoxazole (Other)    Intolerances    Vital Signs Last 24 Hrs  T(C): 38.9, Max: 38.9 ( @ 10:04)  T(F): 102, Max: 102 ( @ 10:04)  HR: 107 (76 - 108)  BP: 148/77 (99/49 - 188/100)  BP(mean): 102 (67 - 139)  RR: 24 (12 - 36)  SpO2: 97% (94% - 100%)  I & Os for 24h ending  @ 07:00  =============================================  IN: 2614 ml / OUT: 813 ml / NET: 1801 ml    I & Os for current day (as of  @ 14:54)  =============================================  IN: 1049 ml / OUT: 335 ml / NET: 714 ml  I & Os for 24h ending  @ 07:00  =============================================  IN: 2614 ml / OUT: 813 ml / NET: 1801 ml    I & Os for current day (as of  @ 14:54)  =============================================  IN: 1049 ml / OUT: 335 ml / NET: 714 ml        LABS:      CBC Full  -  ( 22 Dec 2016 07:06 )  WBC Count : 9.7 K/uL  Hemoglobin : 10.2 g/dL  Hematocrit : 33.8 %  Platelet Count - Automated : 312 K/uL  Mean Cell Volume : 84.7 fL  Mean Cell Hemoglobin : 25.6 pg  Mean Cell Hemoglobin Concentration : 30.2 g/dL      22 Dec 2016 07:06    141    |  101    |  16     ----------------------------<  123    3.8     |  31     |  0.50     Ca    9.0        22 Dec 2016 07:06  Phos  2.5       22 Dec 2016 07:06  Mg     2.4       22 Dec 2016 07:06    TPro  7.0    /  Alb  2.5    /  TBili  0.8    /  DBili  0.39   /  AST  43     /  ALT  33     /  AlkPhos  188    22 Dec 2016 07:06    PT/INR - ( 22 Dec 2016 07:06 )   PT: 14.2 sec;   INR: 1.28          PTT - ( 22 Dec 2016 07:06 )  PTT:27.9 sec      Urinalysis Basic - ( 22 Dec 2016 09:44 )    Color: Criselda / Appearance: Clear / S.025 / pH: x  Gluc: x / Ketone: Trace mg/dL  / Bili: Small / Urobili: 4.0 E.U./dL   Blood: x / Protein: 30 mg/dL / Nitrite: POSITIVE   Leuk Esterase: NEGATIVE / RBC: 5-10 /HPF / WBC < 5 /HPF   Sq Epi: x / Non Sq Epi: Few /HPF / Bacteria: Present /HPF        RADIOLOGY & ADDITIONAL STUDIES (The following images were personally reviewed):      Physical Exam:   Neuro: arousable, alert & following commands, NAD, intubated.  CV: Regular rhythm, tachycardic, +S1S2, no m/r/g.  Pulm: Decreased BS@ bibasilar lungs, clear R&L UL.   Abdominal: soft, ND,  R&L JPs with opaque light-colored output, midline wound down to fascia, sutures visible- packed with WTD dressing  Ext: 1+ edema b/l LE, warm, no c/c, +SCDs  Vasc: 2+ DP/PT b/l

## 2016-12-22 NOTE — PROGRESS NOTE ADULT - ATTENDING COMMENTS
I discussed the case with the surgical resident.  I rounded on the patient twice.  I reviewed the labs, meds, and CXR.  I examined the patient.  Plan outlined.  She did 5 hour trial pf PSV.  Repeat in the am.  Diuresis.  Continue antibiotics.  Wound care.  On tube feed. Cr is normal

## 2016-12-22 NOTE — CONSULT NOTE ADULT - SUBJECTIVE AND OBJECTIVE BOX
AISSATOU DAVIS    [id number]    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 (30 Nov 2016 17:30)      HPI:  56  F with sleeve gastrectomy 2012 complicated by stricture and leak. 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 Pt with persistent vomiting. Pt presents for gastrectomy. (28 Nov 2016 08:57)      PAST MEDICAL & SURGICAL HISTORY:  Reflux  Obesity  DVT (deep venous thrombosis): 2013  Depression  Diverticulitis  Hypertension  Gastric bypass status for obesity: gastric sleeve, 6/2012  S/P breast biopsy: 2011, left  S/P colon resection: 2011  S/P knee surgery: repair 2009, 2011  Umbilical hernia: 2000  S/P appendectomy: 1975  S/P tonsillectomy: 1967        Social History:    FAMILY HISTORY:  No pertinent family history in first degree relatives      Functional Level Prior to Admission:                          10.2   9.7   )-----------( 312      ( 22 Dec 2016 07:06 )             33.8       22 Dec 2016 07:06    141    |  101    |  16     ----------------------------<  123    3.8     |  31     |  0.50     Ca    9.0        22 Dec 2016 07:06  Phos  2.5       22 Dec 2016 07:06  Mg     2.4       22 Dec 2016 07:06    TPro  x      /  Alb  2.5    /  TBili  x      /  DBili  x      /  AST  x      /  ALT  x      /  AlkPhos  x      22 Dec 2016 07:06      Vital Signs Last 24 Hrs  T(C): 38.9, Max: 38.9 (12-22 @ 10:04)  T(F): 102, Max: 102 (12-22 @ 10:04)  HR: 98 (76 - 104)  BP: 156/95 (99/49 - 188/100)  BP(mean): 113 (67 - 139)  RR: 19 (12 - 26)  SpO2: 98% (94% - 100%)      PHYSICAL EXAM:This 56 y-o female was admitted and underwent gastrectomy/repair gastric sleeve leak/ excision of fistula on 11/28/16. s/p tracheostomy for respiratory distress. h/o gastric bypass for obesity. Colon resection. Knee surgery, 2009. Pt. lives with spouse and was independent. 12/22/16, Hb 10.2, Hct 33.8. X-ray chest; newly developing opacity in the Rt. base for which infection should  be considered.      Constitutional:lying in bed, obese. Stable. s/p tracheostomy. N/G tube placement. Needs max. assistance of 2 for bed mobility.    Eyes:neg    ENMT:neg    Neck:no pain    Breasts:    Back:no pain    Respiratory:s/p teracheostomy. In no distress.    Cardiovascular:    Gastrointestinal:    Genitourinary:    Rectal:    Extremities:ROM decreased Rt. shoulder with ? pain. Rt. ankle, slightly tigh ankle but full rom passively. Generally weak, more in proximal muscle, more in Lt. leg.    Vascular:    Neurological: alert, responsice. r/o critical illness myopathy/pneuropathy.    Skin:    Lymph Nodes:    Musculoskeletal:    Psychiatric:            Impression:1. Deconditioned. r/o critical illness myopathy or polyneuropathy. 2. Sepsis, intraabdominal. 3. s/p gastrectomy/repair of gastric sleeve leak. 4. Respiratory distrss/tracheostomy.                                                                                                                                                                                                                Recommendations:1. Bedside PT for ROM, therapeutic ex. and bed mobility as tolerated, discussed with PT. 2. Disposition; subacute rehab placement.

## 2016-12-22 NOTE — PROGRESS NOTE ADULT - ASSESSMENT
56YOF s/p bypass revision following failed SIPS (anastamotic leak) now with fevers and draining intra-abdominal abscess.     Neuro: Dilaudid PRN, Tylenol PRN, Lexapro 20mg daily   CVS: MAP > 70, normotensive goals.   Pulm: Trach /60/12/5. Peridex. Duonebs. Mucomyst.  FEN/GI: NPO. J-tube TF to goal. Protonix. Alb   : Yu. Post-op ATN - Lasix PRN.  Endo: ISS.   ID: MDR Pseudomonas (sputum) - Imipenem (12/14--), fluconazole (12/4--). Bronch wash 12/20- H. simplex Acyclovir (12/20--) \\ DC Vanc (12/13-14), Zosyn (12/3-14).   Heme: SCDs, Restart Heparin gtt  Lines/drains: Left IJ TLC (12/3--), left IJ Juanjo (12/6-12/14). CHECO x 2. Feeding J-tube.  Wounds: Midline abdominal wound - wound vac (change MWF). 56YOF s/p bypass revision following failed SIPS (anastamotic leak) now with fevers and draining intra-abdominal abscess.     Neuro: Dilaudid PRN, Tylenol PRN, Lexapro 20mg daily   CVS: MAP > 70, normotensive goals.   Pulm: Trach /60/12/5. Peridex. Duonebs. Mucomyst.  FEN/GI: NPO. J-tube TF to goal. Protonix. Alb   : Yu. Post-op ATN - Lasix PRN.  Endo: ISS.   ID: MDR Pseudomonas (sputum) - Imipenem (12/14--), fluconazole (12/4--). Bronch wash 12/20- H. simplex Acyclovir (12/20--) \\ DC Vanc (12/13-14), Zosyn (12/3-14).   Heme: SCDs, Restart Heparin gtt  Lines/drains: Left IJ TLC (12/3--), left IJ Juanjo (12/6-12/14). CHECO x 2. Feeding J-tube.  Wounds: Midline abdominal wound - wound vac (change MWF). Left CHECO drain to be removed today.

## 2016-12-23 NOTE — PROGRESS NOTE ADULT - SUBJECTIVE AND OBJECTIVE BOX
Pt seen and examined at bedside, patient noted that abdominal pain is well controlled. Left CHECO drain has been removed. Pt is still spiking fevers. Right CHECO drain putting out 30-40 ccs daily.     REVIEW OF SYSTEMS:  Constitutional: No fever, weight loss or fatigue  Cardiovascular: No chest pain, palpitations, dizziness or leg swelling  Gastrointestinal: No abdominal or epigastric pain. No nausea, vomiting or hematemesis; No diarrhea or constipation. No melena or hematochezia.  Skin: No itching, burning, rashes or lesions       MEDICATIONS:  MEDICATIONS  (STANDING):  pantoprazole  Injectable 40milliGRAM(s) IV Push daily  insulin lispro (HumaLOG) corrective regimen sliding scale  SubCutaneous Before meals and at bedtime  chlorhexidine 0.12% Liquid 15milliLiter(s) Swish and Spit two times a day  fluconAZOLE IVPB 200milliGRAM(s) IV Intermittent every 24 hours  heparin  flush 100 Units/mL Injectable 100Unit(s) IV Push every other day  ALBUTerol/ipratropium for Nebulization 3milliLiter(s) Nebulizer every 6 hours  acetylcysteine 20% Inhalation 3milliLiter(s) Inhalation every 8 hours  imipenem/cilastatin  IVPB  IV Intermittent   imipenem/cilastatin  IVPB 500milliGRAM(s) IV Intermittent every 6 hours  sodium chloride 0.9% lock flush 20milliLiter(s) IV Push once  albumin human 25% IVPB 50milliLiter(s) IV Intermittent every 8 hours  escitalopram 20milliGRAM(s) Oral daily  acyclovir IVPB 550milliGRAM(s) IV Intermittent every 8 hours  propofol Infusion 105.525MICROgram(s)/kG/Min IV Continuous <Continuous>  heparin  Infusion 1500Unit(s)/Hr IV Continuous <Continuous>    MEDICATIONS  (PRN):  acetaminophen    Suspension. 650milliGRAM(s) Oral every 6 hours PRN Mild Pain (1 - 3)  sodium chloride 0.9% lock flush 10milliLiter(s) IV Push every 1 hour PRN After each medication administration  sodium chloride 0.9% lock flush 10milliLiter(s) IV Push every 12 hours PRN Lumen of catheter NOT used  HYDROmorphone  Injectable 1milliGRAM(s) IV Push every 4 hours PRN Severe Pain (7 - 10)  HYDROmorphone  Injectable 0.5milliGRAM(s) IV Push every 4 hours PRN Moderate Pain (4 - 6)  acetaminophen    Suspension 650milliGRAM(s) Enteral Tube every 6 hours PRN For Temp greater than 38 C (100.4 F)  ondansetron Injectable 4milliGRAM(s) IV Push every 6 hours PRN Nausea and/or Vomiting      Allergies    sulfa drugs (Unknown)  sulfamethoxazole (Other)    Intolerances        Vital Signs Last 24 Hrs  T(C): 38, Max: 39.3 ( @ 15:11)  T(F): 100.4, Max: 102.8 ( @ 15:11)  HR: 98 (89 - 108)  BP: 126/68 (107/57 - 166/88)  BP(mean): 85 (71 - 117)  RR: 29 (13 - 29)  SpO2: 98% (95% - 99%)  I & Os for 24h ending  @ 07:00  =============================================  IN: 3104 ml / OUT: 1487 ml / NET: 1617 ml    I & Os for current day (as of  @ 13:29)  =============================================  IN: 1103 ml / OUT: 345 ml / NET: 758 ml      PHYSICAL EXAM:    General: Well developed; well nourished; in no acute distress  HEENT: MMM, conjunctiva and sclera clear  Gastrointestinal: BS+ TTP over the midline incision and drain sites no guarding or rebound, hepatosplenomegaly  Skin: Warm and dry. No obvious rash    LABS:  CBC Full  -  ( 23 Dec 2016 05:08 )  WBC Count : 11.6 K/uL  Hemoglobin : 9.2 g/dL  Hematocrit : 29.9 %  Platelet Count - Automated : 353 K/uL  Mean Cell Volume : 84.2 fL  Mean Cell Hemoglobin : 25.9 pg  Mean Cell Hemoglobin Concentration : 30.8 g/dL  Auto Neutrophil # : x  Auto Lymphocyte # : x  Auto Monocyte # : x  Auto Eosinophil # : x  Auto Basophil # : x  Auto Neutrophil % : x  Auto Lymphocyte % : x  Auto Monocyte % : x  Auto Eosinophil % : x  Auto Basophil % : x    23 Dec 2016 05:08    140    |  101    |  15     ----------------------------<  117    3.6     |  30     |  0.49     Ca    8.6        23 Dec 2016 05:08  Phos  2.6       23 Dec 2016 05:08  Mg     2.1       23 Dec 2016 05:08    TPro  7.0    /  Alb  2.5    /  TBili  0.8    /  DBili  0.39   /  AST  43     /  ALT  33     /  AlkPhos  188    22 Dec 2016 07:06    PT/INR - ( 22 Dec 2016 07:06 )   PT: 14.2 sec;   INR: 1.28          PTT - ( 23 Dec 2016 05:08 )  PTT:50.6 sec      Urinalysis Basic - ( 22 Dec 2016 09:44 )    Color: Criselda / Appearance: Clear / S.025 / pH: x  Gluc: x / Ketone: Trace mg/dL  / Bili: Small / Urobili: 4.0 E.U./dL   Blood: x / Protein: 30 mg/dL / Nitrite: POSITIVE   Leuk Esterase: NEGATIVE / RBC: 5-10 /HPF / WBC < 5 /HPF   Sq Epi: x / Non Sq Epi: Few /HPF / Bacteria: Present /HPF                RADIOLOGY & ADDITIONAL STUDIES:  CT abdomen 16  1.  Interval removal of endoluminal stent from the distal esophagus   extending to the jejunal loop. Interval placement of an esophageal stent.   2.  Slight interval decrease in small to moderate ascites, particularly   in the left upper quadrant surrounding the anastomosis. The largest   pocket measures 1.3 x 6.6 cm in the midline upper pelvis, similar to the   prior study.  3.  Further interval decrease in free air, with a a few punctate foci   remaining.  4.  Slight interval decrease in small right pleural effusion. No change   in small left pleural effusion. Associated atelectasis bilaterally,   unchanged.  5.  New anterior abdominal wall defect at the surgical incision site,   presumably from interval debridement.  6.  Mild cardiomegaly and trace pericardial effusion, unchanged.  7.  Diffuse anasarca.

## 2016-12-23 NOTE — PROGRESS NOTE ADULT - SUBJECTIVE AND OBJECTIVE BOX
S: NAEO.  Patient reports    O: Vitals: Vital Signs Last 24 Hrs  T(C): 37.9, Max: 38.8 ( @ 10:22)  T(F): 100.3, Max: 101.9 ( @ 10:22)  HR: 88 (83 - 106)  BP: 112/61 (88/56 - 166/88)  BP(mean): 80 (71 - 117)  RR: 14 (14 - 29)  SpO2: 98% (94% - 100%)    CAPILLARY BLOOD GLUCOSE  106 (23 Dec 2016 16:00)  141 (23 Dec 2016 12:00)  117 (23 Dec 2016 07:00)      PHYSICAL EXAM:  Neuro: A&Ox3, NAD, grossly neuro intact  CV: RRR, no MRGs  Pulm: CTAB, no wheezes, rales ronchi  Abd: BS (+), Soft, NT, ND  Ext: No edema peripherally or centrally  Vasc: 2+ pulses - radial and PT      I & Os for 24h ending  @ 07:00  =============================================  IN: 3104 ml / OUT: 1487 ml / NET: 1617 ml    I & Os for current day (as of  @ 23:50)  =============================================  IN: 2589 ml / OUT: 1405 ml / NET: 1184 ml      Labs:   Mode: AC/ CMV (Assist Control/ Continuous Mandatory Ventilation), RR (machine): 14, TV (machine): 400, FiO2: 50, PEEP: 6, PIP: 21                          9.2    11.6  )-----------( 353      ( 23 Dec 2016 05:08 )             29.9   23 Dec 2016 05:08    140    |  101    |  15     ----------------------------<  117    3.6     |  30     |  0.49     Ca    8.6        23 Dec 2016 05:08  Phos  2.6       23 Dec 2016 05:08  Mg     2.1       23 Dec 2016 05:08    TPro  7.0    /  Alb  2.5    /  TBili  0.8    /  DBili  0.39   /  AST  43     /  ALT  33     /  AlkPhos  188    22 Dec 2016 07:06  PT/INR - ( 23 Dec 2016 14:56 )   PT: 14.9 sec;   INR: 1.34          PTT - ( 23 Dec 2016 14:56 )  PTT:50.2 secUrinalysis Basic - ( 22 Dec 2016 09:44 )    Color: Criselda / Appearance: Clear / S.025 / pH: x  Gluc: x / Ketone: Trace mg/dL  / Bili: Small / Urobili: 4.0 E.U./dL   Blood: x / Protein: 30 mg/dL / Nitrite: POSITIVE   Leuk Esterase: NEGATIVE / RBC: 5-10 /HPF / WBC < 5 /HPF   Sq Epi: x / Non Sq Epi: Few /HPF / Bacteria: Present /HPF          RADIOLOGY & ADDITIONAL STUDIES: S: NAEO.  New midline wound vac placed.   Patient tolerated CPAP for only few hours in AM.    O: Vitals: Vital Signs Last 24 Hrs  T(C): 37.9, Max: 38.8 ( @ 10:22)  T(F): 100.3, Max: 101.9 ( @ 10:22)  HR: 88 (83 - 106)  BP: 112/61 (88/56 - 166/88)  BP(mean): 80 (71 - 117)  RR: 14 (14 - 29)  SpO2: 98% (94% - 100%)    CAPILLARY BLOOD GLUCOSE  106 (23 Dec 2016 16:00)  141 (23 Dec 2016 12:00)  117 (23 Dec 2016 07:00)      PHYSICAL EXAM:  Neuro: arousable, alert & following commands, NAD, trached on vent  CV: Regular rhythm, tachycardic, +S1S2, no m/r/g.  Pulm: Diminished breath sounds bibasilar lungs, clear R&L UL.   Abdominal: soft, ND,  R&L JPs with opaque light-colored output, midline wound down to fascia, sutures visible- packed with WTD dressing  Ext: 1+ edema b/l LE, warm, no c/c, +SCDs  Vasc: 2+ DP/PT b/l    I & Os for 24h ending  @ 07:00  =============================================  IN: 3104 ml / OUT: 1487 ml / NET: 1617 ml    I & Os for current day (as of  @ 23:50)  =============================================  IN: 2589 ml / OUT: 1405 ml / NET: 1184 ml      Labs:   Mode: AC/ CMV (Assist Control/ Continuous Mandatory Ventilation), RR (machine): 14, TV (machine): 400, FiO2: 50, PEEP: 6, PIP: 21                          9.2    11.6  )-----------( 353      ( 23 Dec 2016 05:08 )             29.9   23 Dec 2016 05:08    140    |  101    |  15     ----------------------------<  117    3.6     |  30     |  0.49     Ca    8.6        23 Dec 2016 05:08  Phos  2.6       23 Dec 2016 05:08  Mg     2.1       23 Dec 2016 05:08    TPro  7.0    /  Alb  2.5    /  TBili  0.8    /  DBili  0.39   /  AST  43     /  ALT  33     /  AlkPhos  188    22 Dec 2016 07:06  PT/INR - ( 23 Dec 2016 14:56 )   PT: 14.9 sec;   INR: 1.34          PTT - ( 23 Dec 2016 14:56 )  PTT:50.2 secUrinalysis Basic - ( 22 Dec 2016 09:44 )    Color: Criselda / Appearance: Clear / S.025 / pH: x  Gluc: x / Ketone: Trace mg/dL  / Bili: Small / Urobili: 4.0 E.U./dL   Blood: x / Protein: 30 mg/dL / Nitrite: POSITIVE   Leuk Esterase: NEGATIVE / RBC: 5-10 /HPF / WBC < 5 /HPF   Sq Epi: x / Non Sq Epi: Few /HPF / Bacteria: Present /HPF          RADIOLOGY & ADDITIONAL STUDIES:

## 2016-12-23 NOTE — PROGRESS NOTE ADULT - ATTENDING COMMENTS
The patient was seen and examined. I discussed the case in details with the surgical resident. I rounded on the patient twice. I reviewed the lapse medication a chest x-ray. The plan is outlined. The patient is still spiking fever on the current antibiotic. Cultures from the bronchoscopy reviewed. Continue on antibiotic continue on weaning trials as tolerated. Maintain the fluid balance at least equal. Patient is on to feeling.  wound care.

## 2016-12-23 NOTE — PROGRESS NOTE ADULT - SUBJECTIVE AND OBJECTIVE BOX
patient looks better with trach  changed dressing and has fluid superior which could be enteric  covered with vaseline and placed vac  remainder of wound is fine  j tube secured  stent in good position and minimal out rt casey   left our  discussed plan and seen with dr aquino who is covering until 1/3

## 2016-12-23 NOTE — PROGRESS NOTE ADULT - ASSESSMENT
56 year old critical patient s/p SIPs procedure with free air concerning for anastomotic leak s/p emergent ex lap with abdominal washout found to have leak of esophagojejunostomy which they could not repair due to its location.  Patient s/p esophageal stent placement, peritoneal washout and internal double pigtail stent placement. During endoscopy second defect noted on enteric side of anastomosis. Patient also with PNA. Pt having trach palced at bedside this morning. Repeat endoscopy removed stent and placed a second stent over entire 5 cm anastomotic defect, minimal CHECO output suggestive of proper placement. Stent stitched in place.   Plan discussed with SICU team.   - Cont IV abx  -monitor CHECO output  - Strict NPO to ensure no stent migration and healing of defect   - cont J tube feeds for nutrition  - defer treatment plan to SICU team

## 2016-12-23 NOTE — PROGRESS NOTE ADULT - ASSESSMENT
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     Neuro: Dilaudid PRN, Tylenol PRN, Lexapro 20mg daily, propofol gtt   CVS: MAP > 70, normotensive goals. abumin 25%q8h    Pulm: Trach /60/12/5. Peridex. Duonebs. Mucomyst.  FEN/GI: NPO. J-tube TF to goal. Protonix.   : Yu. Post-op ATN - Lasix PRN.  Endo: ISS.   ID: MDR Pseudomonas (sputum) - Imipenem (12/14--), fluconazole (12/4--). Bronch wash 12/20- H. simplex Acyclovir (12/20--) \\ DC Vanc (12/13-14), Zosyn (12/3-14).   Heme: SCDs, Heparin gtt goal 60-80  Lines/drains: Left IJ TLC (12/3--), left IJ Juanjo (12/6-12/14). CHECO x 2. Feeding J-tube.  Wounds: Midline abdominal wound - wound vac (change MW).

## 2016-12-24 NOTE — BRIEF OPERATIVE NOTE - POST-OP DX
Mucus plugging of bronchi  12/24/2016    Active  Markos Chu
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

## 2016-12-24 NOTE — PROGRESS NOTE ADULT - SUBJECTIVE AND OBJECTIVE BOX
Pt seen and examined. Still requiring breakthrough pain medication, but no n/v and no f/c.    REVIEW OF SYSTEMS:  Constitutional: No fever, weight loss or fatigue  Cardiovascular: No chest pain, palpitations, dizziness or leg swelling  Gastrointestinal: . No nausea, vomiting or hematemesis; No diarrhea or constipation. No melena or hematochezia.  Skin: No itching, burning, rashes or lesions       MEDICATIONS:  MEDICATIONS  (STANDING):  pantoprazole  Injectable 40milliGRAM(s) IV Push daily  insulin lispro (HumaLOG) corrective regimen sliding scale  SubCutaneous Before meals and at bedtime  chlorhexidine 0.12% Liquid 15milliLiter(s) Swish and Spit two times a day  fluconAZOLE IVPB 200milliGRAM(s) IV Intermittent every 24 hours  heparin  flush 100 Units/mL Injectable 100Unit(s) IV Push every other day  ALBUTerol/ipratropium for Nebulization 3milliLiter(s) Nebulizer every 6 hours  acetylcysteine 20% Inhalation 3milliLiter(s) Inhalation every 8 hours  imipenem/cilastatin  IVPB  IV Intermittent   imipenem/cilastatin  IVPB 500milliGRAM(s) IV Intermittent every 6 hours  sodium chloride 0.9% lock flush 20milliLiter(s) IV Push once  albumin human 25% IVPB 50milliLiter(s) IV Intermittent every 8 hours  escitalopram 20milliGRAM(s) Oral daily  acyclovir IVPB 550milliGRAM(s) IV Intermittent every 8 hours  propofol Infusion 105.525MICROgram(s)/kG/Min IV Continuous <Continuous>  heparin  Infusion 1800Unit(s)/Hr IV Continuous <Continuous>    MEDICATIONS  (PRN):  acetaminophen    Suspension. 650milliGRAM(s) Oral every 6 hours PRN Mild Pain (1 - 3)  sodium chloride 0.9% lock flush 10milliLiter(s) IV Push every 1 hour PRN After each medication administration  sodium chloride 0.9% lock flush 10milliLiter(s) IV Push every 12 hours PRN Lumen of catheter NOT used  acetaminophen    Suspension 650milliGRAM(s) Enteral Tube every 6 hours PRN For Temp greater than 38 C (100.4 F)  ondansetron Injectable 4milliGRAM(s) IV Push every 6 hours PRN Nausea and/or Vomiting  HYDROmorphone  Injectable 1milliGRAM(s) IV Push every 4 hours PRN Severe Pain (7 - 10)  HYDROmorphone  Injectable 0.5milliGRAM(s) IV Push every 4 hours PRN Moderate Pain (4 - 6)      Allergies    sulfa drugs (Unknown)  sulfamethoxazole (Other)    Intolerances        Vital Signs Last 24 Hrs  T(C): 36.6, Max: 38.8 (12-23 @ 10:22)  T(F): 97.9, Max: 101.9 (12-23 @ 10:22)  HR: 90 (83 - 106)  BP: 135/76 (88/56 - 166/88)  BP(mean): 97 (80 - 117)  RR: 20 (14 - 29)  SpO2: 96% (94% - 100%)  I & Os for 24h ending 12-24 @ 07:00  =============================================  IN: 3622 ml / OUT: 1745 ml / NET: 1877 ml    I & Os for current day (as of 12-24 @ 09:47)  =============================================  IN: 87 ml / OUT: 30 ml / NET: 57 ml      PHYSICAL EXAM:    General: resting in bed comfortably in no acute distress  HEENT: MMM, conjunctiva and sclera clear  Gastrointestinal: Soft mild tenderness in R abdomen non-distended; Normal bowel sounds; No hepatosplenomegaly. No rebound or guarding  R CHECO minimal turbid lquid, feeding tube site clean, wound vac site clean  Skin: Warm and dry. No obvious rash    LABS:  CBC Full  -  ( 24 Dec 2016 06:17 )  WBC Count : 10.2 K/uL  Hemoglobin : 9.0 g/dL  Hematocrit : 30.1 %  Platelet Count - Automated : 320 K/uL  Mean Cell Volume : 85.3 fL  Mean Cell Hemoglobin : 25.5 pg  Mean Cell Hemoglobin Concentration : 29.9 g/dL  Auto Neutrophil # : x  Auto Lymphocyte # : x  Auto Monocyte # : x  Auto Eosinophil # : x  Auto Basophil # : x  Auto Neutrophil % : x  Auto Lymphocyte % : x  Auto Monocyte % : x  Auto Eosinophil % : x  Auto Basophil % : x    24 Dec 2016 06:15    139    |  100    |  17     ----------------------------<  109    3.8     |  30     |  0.48     Ca    8.5        24 Dec 2016 06:15  Phos  2.7       24 Dec 2016 06:15  Mg     2.1       24 Dec 2016 06:15      PT/INR - ( 24 Dec 2016 06:17 )   PT: 15.4 sec;   INR: 1.38          PTT - ( 24 Dec 2016 06:17 )  PTT:56.4 sec                  RADIOLOGY & ADDITIONAL STUDIES:

## 2016-12-24 NOTE — PROGRESS NOTE ADULT - SUBJECTIVE AND OBJECTIVE BOX
S: Pt reports no current discomfort. Patient was febrile overnight.     Vitals: Vital Signs Last 24 Hrs  T(C): 36.6, Max: 38.8 ( @ 10:22)  T(F): 97.9, Max: 101.9 ( @ 10:22)  HR: 84 (83 - 106)  BP: 135/76 (88/56 - 166/88)  BP(mean): 97 (80 - 117)  RR: 20 (14 - 29)  SpO2: 97% (94% - 100%)    CAPILLARY BLOOD GLUCOSE  100 (23 Dec 2016 22:00)  106 (23 Dec 2016 16:00)  141 (23 Dec 2016 12:00)      I & Os for 24h ending  @ 07:00  =============================================  IN: 3622 ml / OUT: 1745 ml / NET: 1877 ml    I & Os for current day (as of  @ 08:15)  =============================================  IN: 87 ml / OUT: 30 ml / NET: 57 ml          Labs:                         9.0    10.2  )-----------( 320      ( 24 Dec 2016 06:17 )             30.1   24 Dec 2016 06:15    139    |  100    |  17     ----------------------------<  109    3.8     |  30     |  0.48     Ca    8.5        24 Dec 2016 06:15  Phos  2.7       24 Dec 2016 06:15  Mg     2.1       24 Dec 2016 06:15    PT/INR - ( 24 Dec 2016 06:17 )   PT: 15.4 sec;   INR: 1.38          PTT - ( 23 Dec 2016 23:39 )  PTT:61.4 secUrinalysis Basic - ( 22 Dec 2016 09:44 )    Color: Criselda / Appearance: Clear / S.025 / pH: x  Gluc: x / Ketone: Trace mg/dL  / Bili: Small / Urobili: 4.0 E.U./dL   Blood: x / Protein: 30 mg/dL / Nitrite: POSITIVE   Leuk Esterase: NEGATIVE / RBC: 5-10 /HPF / WBC < 5 /HPF   Sq Epi: x / Non Sq Epi: Few /HPF / Bacteria: Present /HPF        Electrolytes repleated to goals as follows: Potassium 4, Phosphorus 3 and Magnesium 2 where appropriate and necessary    Exam:  Neuro: A&Ox3, NAD, grossly neuro intact  CV: RRR, no MRGs  Pulm: decreased L sided breath sounds, rhonchi on bilateral bases  Abd: BS hypoactive, central incision with vac, feculent smell, Soft, diffuse ttp, moderately distended  Ext: 3+ global edema  Vasc: Extremities warm to palpation, 2+ pulses - radial and PT

## 2016-12-24 NOTE — PROGRESS NOTE ADULT - ASSESSMENT
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     Neuro: Dilaudid PRN, Tylenol PRN, Lexapro 20mg daily, propofol gtt   CVS: MAP > 70, normotensive goals. abumin 25%q8h    Pulm: Trach /60/12/5. Peridex. Duonebs. Mucomyst.bronchoscopy today for L sided congestion on Xray  FEN/GI: NPO. J-tube TF to goal. Protonix.   : Yu. Post-op ATN - Lasix PRN.  Endo: ISS.   ID: MDR Pseudomonas (sputum) - Imipenem (12/14--), fluconazole (12/4--). Bronch wash 12/20- H. simplex Acyclovir (12/20--) \\ DC Vanc (12/13-14), Zosyn (12/3-14).   Heme: SCDs, Heparin gtt goal 60-80, titrate  Lines/drains: Left IJ TLC (12/3--), left IJ Juanjo (12/6-12/14). CHECO x 2. Feeding J-tube.  Wounds: Midline abdominal wound - wound vac (change MW).

## 2016-12-24 NOTE — BRIEF OPERATIVE NOTE - PRE-OP DX
Mucus plugging of bronchi  12/13/2016    Active  Richi Julio
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

## 2016-12-24 NOTE — PROGRESS NOTE ADULT - ASSESSMENT
56 year old critical patient s/p SIPs procedure with free air concerning for anastomotic leak s/p emergent ex lap with abdominal washout found to have leak of esophagojejunostomy which they could not repair due to its location.  Patient s/p esophageal stent placement, peritoneal washout and internal double pigtail stent placement. During endoscopy second defect noted on enteric side of anastomosis. Patient also with PNA. Pt having trach palced at bedside this morning. Repeat endoscopy removed stent and placed a second stent over entire 5 cm anastomotic defect. Stent stitched in place. Decreasing CHECO output suggestive of proper placement. WBC normalized, but persistent fevers[facility]  - Cont IV abx, consider broadening coverage to include MRSA coverage in setting of persistent fever  -cont pain management  -monitor CHECO output  - Strict NPO to ensure no stent migration and healing of defect   - cont J tube feeds for nutrition  - defer treatment plan to SICU team

## 2016-12-24 NOTE — BRIEF OPERATIVE NOTE - COMMENTS
patient tolerated the procedure but the repeat CXR was unchanged and required reintubation
tolerated procedure
tolerated well
she tolerated the bronchoscopy with reexpaansion of the left lung
no ptx
tolerated procedure Trach tomorrow

## 2016-12-24 NOTE — PROGRESS NOTE ADULT - SUBJECTIVE AND OBJECTIVE BOX
Interval Events:  Patient seen and examined at bedside.    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 (30 Nov 2016 17:30)      PAST MEDICAL & SURGICAL HISTORY:  Reflux  Obesity  DVT (deep venous thrombosis): 2013  Depression  Diverticulitis  Hypertension  Gastric bypass status for obesity: gastric sleeve, 6/2012  S/P breast biopsy: 2011, left  S/P colon resection: 2011  S/P knee surgery: repair 2009, 2011  Umbilical hernia: 2000  S/P appendectomy: 1975  S/P tonsillectomy: 1967      MEDICATIONS:  Pulmonary:  ALBUTerol/ipratropium for Nebulization 3milliLiter(s) Nebulizer every 6 hours  acetylcysteine 20% Inhalation 3milliLiter(s) Inhalation every 8 hours    Antimicrobials:  fluconAZOLE IVPB 200milliGRAM(s) IV Intermittent every 24 hours  imipenem/cilastatin  IVPB  IV Intermittent   imipenem/cilastatin  IVPB 500milliGRAM(s) IV Intermittent every 6 hours  acyclovir IVPB 550milliGRAM(s) IV Intermittent every 8 hours  linezolid  IVPB  IV Intermittent     Anticoagulants:  heparin  flush 100 Units/mL Injectable 100Unit(s) IV Push every other day  heparin  Infusion 1800Unit(s)/Hr IV Continuous <Continuous>    Cardiac:      Allergies    sulfa drugs (Unknown)  sulfamethoxazole (Other)    Intolerances        Vital Signs Last 24 Hrs  T(C): 37.9, Max: 38.3 (12-24 @ 00:46)  T(F): 100.2, Max: 101 (12-24 @ 00:46)  HR: 89 (81 - 98)  BP: 119/66 (100/59 - 155/85)  BP(mean): 91 (72 - 828)  RR: 14 (14 - 20)  SpO2: 95% (92% - 99%)  I & Os for 24h ending 12-24 @ 07:00  =============================================  IN: 3622 ml / OUT: 1745 ml / NET: 1877 ml    I & Os for current day (as of 12-24 @ 21:05)  =============================================  IN: 1840 ml / OUT: 335 ml / NET: 1505 ml    Mode: AC/ CMV (Assist Control/ Continuous Mandatory Ventilation)  RR (machine): 14  TV (machine): 400  FiO2: 50  PEEP: 6  ITime: 1  MAP: 10  PIP: 21      LABS:      CBC Full  -  ( 24 Dec 2016 06:17 )  WBC Count : 10.2 K/uL  Hemoglobin : 9.0 g/dL  Hematocrit : 30.1 %  Platelet Count - Automated : 320 K/uL  Mean Cell Volume : 85.3 fL  Mean Cell Hemoglobin : 25.5 pg  Mean Cell Hemoglobin Concentration : 29.9 g/dL  Auto Neutrophil # : x  Auto Lymphocyte # : x  Auto Monocyte # : x  Auto Eosinophil # : x  Auto Basophil # : x  Auto Neutrophil % : x  Auto Lymphocyte % : x  Auto Monocyte % : x  Auto Eosinophil % : x  Auto Basophil % : x    24 Dec 2016 06:15    139    |  100    |  17     ----------------------------<  109    3.8     |  30     |  0.48     Ca    8.5        24 Dec 2016 06:15  Phos  2.7       24 Dec 2016 06:15  Mg     2.1       24 Dec 2016 06:15      PT/INR - ( 24 Dec 2016 06:17 )   PT: 15.4 sec;   INR: 1.38          PTT - ( 24 Dec 2016 15:43 )  PTT:63.9 sec                    RADIOLOGY & ADDITIONAL STUDIES (The following images were personally reviewed):  Yu:                            Yes        No  Urine output:               Yes        No  DVT prophylaxis:         Yes        No  Flattus:                          Yes        No  Bowel movement:       Yes        No

## 2016-12-24 NOTE — BRIEF OPERATIVE NOTE - OPERATION/FINDINGS
Pentax 3.2 bronchoscope inserted to size 8 tracheostomy tube.  Left airway evaluated first and mucus plug noted in lower left main stem bronchi which was therapeutically aspirated.  Right airways had no purulent secretions.

## 2016-12-25 NOTE — PROGRESS NOTE ADULT - SUBJECTIVE AND OBJECTIVE BOX
Pt seen and examined     REVIEW OF SYSTEMS:  Constitutional: No fever, weight loss or fatigue  Cardiovascular: No chest pain, palpitations, dizziness or leg swelling  Gastrointestinal: No abdominal or epigastric pain. No nausea, vomiting or hematemesis; No diarrhea or constipation. No melena or hematochezia.  Skin: No itching, burning, rashes or lesions       MEDICATIONS:  MEDICATIONS  (STANDING):  pantoprazole  Injectable 40milliGRAM(s) IV Push daily  insulin lispro (HumaLOG) corrective regimen sliding scale  SubCutaneous Before meals and at bedtime  chlorhexidine 0.12% Liquid 15milliLiter(s) Swish and Spit two times a day  heparin  flush 100 Units/mL Injectable 100Unit(s) IV Push every other day  ALBUTerol/ipratropium for Nebulization 3milliLiter(s) Nebulizer every 6 hours  acetylcysteine 20% Inhalation 3milliLiter(s) Inhalation every 8 hours  imipenem/cilastatin  IVPB  IV Intermittent   imipenem/cilastatin  IVPB 500milliGRAM(s) IV Intermittent every 6 hours  sodium chloride 0.9% lock flush 20milliLiter(s) IV Push once  albumin human 25% IVPB 50milliLiter(s) IV Intermittent every 8 hours  propofol Infusion 105.525MICROgram(s)/kG/Min IV Continuous <Continuous>  heparin  Infusion 1800Unit(s)/Hr IV Continuous <Continuous>  potassium acid phosphate/sodium acid phosphate tablet (K-PHOS No. 2) 3Tablet(s) Oral once    MEDICATIONS  (PRN):  acetaminophen    Suspension. 650milliGRAM(s) Oral every 6 hours PRN Mild Pain (1 - 3)  sodium chloride 0.9% lock flush 10milliLiter(s) IV Push every 1 hour PRN After each medication administration  sodium chloride 0.9% lock flush 10milliLiter(s) IV Push every 12 hours PRN Lumen of catheter NOT used  acetaminophen    Suspension 650milliGRAM(s) Enteral Tube every 6 hours PRN For Temp greater than 38 C (100.4 F)  ondansetron Injectable 4milliGRAM(s) IV Push every 6 hours PRN Nausea and/or Vomiting  HYDROmorphone  Injectable 1milliGRAM(s) IV Push every 4 hours PRN Severe Pain (7 - 10)  HYDROmorphone  Injectable 0.5milliGRAM(s) IV Push every 4 hours PRN Moderate Pain (4 - 6)      Allergies    sulfa drugs (Unknown)  sulfamethoxazole (Other)    Intolerances        Vital Signs Last 24 Hrs  T(C): 37.7, Max: 38.3 (12-24 @ 10:30)  T(F): 99.8, Max: 101 (12-24 @ 10:30)  HR: 91 (79 - 98)  BP: 119/71 (100/59 - 155/85)  BP(mean): 85 (72 - 828)  RR: 16 (14 - 18)  SpO2: 99% (92% - 99%)  I & Os for 24h ending 12-25 @ 07:00  =============================================  IN: 3390 ml / OUT: 906 ml / NET: 2484 ml    I & Os for current day (as of 12-25 @ 10:00)  =============================================  IN: 87 ml / OUT: 0 ml / NET: 87 ml      PHYSICAL EXAM:    General: Well developed; well nourished; in no acute distress  HEENT: MMM, conjunctiva and sclera clear  Gastrointestinal: Soft non-tender non-distended; Normal bowel sounds; No hepatosplenomegaly  Skin: Warm and dry. No obvious rash    LABS:      CBC Full  -  ( 25 Dec 2016 07:08 )  WBC Count : 10.6 K/uL  Hemoglobin : 8.6 g/dL  Hematocrit : 29.4 %  Platelet Count - Automated : 373 K/uL  Mean Cell Volume : 86.0 fL  Mean Cell Hemoglobin : 25.1 pg  Mean Cell Hemoglobin Concentration : 29.3 g/dL      25 Dec 2016 07:08    135    |  98     |  14     ----------------------------<  89     3.9     |  30     |  0.42     Ca    8.7        25 Dec 2016 07:08  Phos  2.5       25 Dec 2016 07:08  Mg     2.2       25 Dec 2016 07:08      PT/INR - ( 25 Dec 2016 07:08 )   PT: 13.8 sec;   INR: 1.24          PTT - ( 25 Dec 2016 07:08 )  PTT:48.4 sec                  RADIOLOGY & ADDITIONAL STUDIES (The following images were personally reviewed): Pt seen and examined at bedside. Afebrile. HD stable.       MEDICATIONS:  MEDICATIONS  (STANDING):  pantoprazole  Injectable 40milliGRAM(s) IV Push daily  insulin lispro (HumaLOG) corrective regimen sliding scale  SubCutaneous Before meals and at bedtime  chlorhexidine 0.12% Liquid 15milliLiter(s) Swish and Spit two times a day  heparin  flush 100 Units/mL Injectable 100Unit(s) IV Push every other day  ALBUTerol/ipratropium for Nebulization 3milliLiter(s) Nebulizer every 6 hours  acetylcysteine 20% Inhalation 3milliLiter(s) Inhalation every 8 hours  imipenem/cilastatin  IVPB  IV Intermittent   imipenem/cilastatin  IVPB 500milliGRAM(s) IV Intermittent every 6 hours  sodium chloride 0.9% lock flush 20milliLiter(s) IV Push once  albumin human 25% IVPB 50milliLiter(s) IV Intermittent every 8 hours  propofol Infusion 105.525MICROgram(s)/kG/Min IV Continuous <Continuous>  heparin  Infusion 1800Unit(s)/Hr IV Continuous <Continuous>  potassium acid phosphate/sodium acid phosphate tablet (K-PHOS No. 2) 3Tablet(s) Oral once    MEDICATIONS  (PRN):  acetaminophen    Suspension. 650milliGRAM(s) Oral every 6 hours PRN Mild Pain (1 - 3)  sodium chloride 0.9% lock flush 10milliLiter(s) IV Push every 1 hour PRN After each medication administration  sodium chloride 0.9% lock flush 10milliLiter(s) IV Push every 12 hours PRN Lumen of catheter NOT used  acetaminophen    Suspension 650milliGRAM(s) Enteral Tube every 6 hours PRN For Temp greater than 38 C (100.4 F)  ondansetron Injectable 4milliGRAM(s) IV Push every 6 hours PRN Nausea and/or Vomiting  HYDROmorphone  Injectable 1milliGRAM(s) IV Push every 4 hours PRN Severe Pain (7 - 10)  HYDROmorphone  Injectable 0.5milliGRAM(s) IV Push every 4 hours PRN Moderate Pain (4 - 6)      Allergies    sulfa drugs (Unknown)  sulfamethoxazole (Other)    Intolerances        Vital Signs Last 24 Hrs  T(C): 37.7, Max: 38.3 (12-24 @ 10:30)  T(F): 99.8, Max: 101 (12-24 @ 10:30)  HR: 91 (79 - 98)  BP: 119/71 (100/59 - 155/85)  BP(mean): 85 (72 - 828)  RR: 16 (14 - 18)  SpO2: 99% (92% - 99%)  I & Os for 24h ending 12-25 @ 07:00  =============================================  IN: 3390 ml / OUT: 906 ml / NET: 2484 ml    I & Os for current day (as of 12-25 @ 10:00)  =============================================  IN: 87 ml / OUT: 0 ml / NET: 87 ml      PHYSICAL EXAM:    General: trach'd, responding to commands appropriately, NAD  HEENT: anicteric   Gastrointestinal: Soft non-tender, CHECO in place with minimal output.      LABS:      CBC Full  -  ( 25 Dec 2016 07:08 )  WBC Count : 10.6 K/uL  Hemoglobin : 8.6 g/dL  Hematocrit : 29.4 %  Platelet Count - Automated : 373 K/uL  Mean Cell Volume : 86.0 fL  Mean Cell Hemoglobin : 25.1 pg  Mean Cell Hemoglobin Concentration : 29.3 g/dL      25 Dec 2016 07:08    135    |  98     |  14     ----------------------------<  89     3.9     |  30     |  0.42     Ca    8.7        25 Dec 2016 07:08  Phos  2.5       25 Dec 2016 07:08  Mg     2.2       25 Dec 2016 07:08      PT/INR - ( 25 Dec 2016 07:08 )   PT: 13.8 sec;   INR: 1.24          PTT - ( 25 Dec 2016 07:08 )  PTT:48.4 sec                  RADIOLOGY & ADDITIONAL STUDIES (The following images were personally reviewed):

## 2016-12-25 NOTE — PROGRESS NOTE ADULT - SUBJECTIVE AND OBJECTIVE BOX
S: Pt reports no complaints    Vitals: Vital Signs Last 24 Hrs  T(C): 37.7, Max: 38.3 (12-24 @ 10:30)  T(F): 99.8, Max: 101 (12-24 @ 10:30)  HR: 84 (79 - 98)  BP: 110/66 (100/59 - 155/85)  BP(mean): 80 (72 - 828)  RR: 14 (14 - 20)  SpO2: 94% (92% - 98%)    CAPILLARY BLOOD GLUCOSE  97 (25 Dec 2016 07:00)  92 (24 Dec 2016 17:00)  114 (24 Dec 2016 12:00)      I & Os for 24h ending 12-25 @ 07:00  =============================================  IN: 3390 ml / OUT: 906 ml / NET: 2484 ml    I & Os for current day (as of 12-25 @ 07:54)  =============================================  IN: 87 ml / OUT: 0 ml / NET: 87 ml          Labs:                         8.6    10.6  )-----------( 373      ( 25 Dec 2016 07:08 )             29.4   25 Dec 2016 07:08    135    |  98     |  14     ----------------------------<  89     3.9     |  30     |  0.42     Ca    8.7        25 Dec 2016 07:08  Phos  2.5       25 Dec 2016 07:08  Mg     2.2       25 Dec 2016 07:08    PT/INR - ( 25 Dec 2016 07:08 )   PT: 13.8 sec;   INR: 1.24          PTT - ( 25 Dec 2016 07:08 )  PTT:48.4 sec    Electrolytes repleated to goals as follows: Potassium 4, Phosphorus 3 and Magnesium 2 where appropriate and necessary    Exam:  Neuro: A&Ox3, NAD, grossly neuro intact  CV: RRR, no MRGs  Pulm: decreased L sided breath sounds, rhonchi on bilateral bases  Abd: BS hypoactive, central incision with vac, feculent smell, Soft, diffuse ttp, moderately distended  Ext: 3+ global edema  Vasc: Extremities warm to palpation, 2+ pulses - radial and PT

## 2016-12-25 NOTE — CONSULT NOTE ADULT - SUBJECTIVE AND OBJECTIVE BOX
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  ID consult for assistance with determining source and antibiotic selection.  On 11/28, she underwent Harmony-en-Y gastric bypass with removal of remnant stomach and YOSELYN. She initially did well but then on 12/3, was taken to the OR emergently and found to have esophagojejunostomy anastomosis that could not be repaired b/o its location.  She underwent ex-lap with abdominal washout and placement of feeding J-tube distal to the anastomosis.  Post-operatively, she became anuric and had high pressor requirements.  She did not require RRR and her Cr gradually decreased.  She continued to be vent dependent.  On 12/7, she underwent endoscopic stent placement at the anastamosis.  On 12/8, she began having intermittent low grade temp increases.  She had the first of several bronchoscopies for mucous plugging with atelectasis.  CT on 12/11 showed bibasilar atelectasis/consolidation, additional groundglass opacities, hepatomegaly, GB distension with stones vs. sludge, colonic diverticulosis, left adnexal cysts vs. focal fluid collection, apparent interval decrease in multiple loculated rim-enhancing fluid collections throughout the abd and pelvis. On 12/12, she underwent EGD and was found to have a second defect on the enteric side of the antastamosis.  A biliary stent was placed into the esophageal defect as internal drain was covered by an esophageal stent.  The peritoneal defect was washed with hydrogen peroxide. She was extubated on 12/12 but required re-intubation on 12/13.  On 12/13, she had T 102, hypotension felt to be from nosocomial pneumonia with bronchial wash positive for GNR – pip/tazo was d/leida, she was started on imipenem and given one dose of vancomycin.  PICC placed 12/16.  Since that time, she has had intermittent low grade temp increases (T 100-101) with fevers on 12/20, 12/22, 12/23. On 12/20, she was started on acyclovir because a nasopharyngeal viral culture detected HSV 1.  The endoluminal stent was removed from the distal esophagus extending to jejunal loop.  CT on 12/21 noted slight decrease in small to mod ascites, ray around anastomosis.  She has undergone bronch on 12/10, 12/13, 12/16, 12/19, 12/20, 12/24.  She underwent trach on 12/20.    She has received the following antibiotics:  Past:  Metronidazole 11/28-12/2  Cefoxitin 11/29-12/1  Pip/tazo 12/8-12/13  Vancomycin 12/3, 12/13    Current:  Imipenem 12/14-  Fluconazole 12/4  Acyclovir 12/20 -    Linezolid 12/24      PAST MEDICAL & SURGICAL HISTORY:  Reflux  Obesity  DVT (deep venous thrombosis): 2013  Depression  Diverticulitis  Hypertension  Gastric bypass status for obesity: gastric sleeve, 6/2012  S/P breast biopsy: 2011, left  S/P colon resection: 2011  S/P knee surgery: repair 2009, 2011  Umbilical hernia: 2000  S/P appendectomy: 1975  S/P tonsillectomy: 1967          MEDICATIONS  (STANDING):  pantoprazole  Injectable 40milliGRAM(s) IV Push daily  insulin lispro (HumaLOG) corrective regimen sliding scale  SubCutaneous Before meals and at bedtime  chlorhexidine 0.12% Liquid 15milliLiter(s) Swish and Spit two times a day  fluconAZOLE IVPB 200milliGRAM(s) IV Intermittent every 24 hours  heparin  flush 100 Units/mL Injectable 100Unit(s) IV Push every other day  ALBUTerol/ipratropium for Nebulization 3milliLiter(s) Nebulizer every 6 hours  acetylcysteine 20% Inhalation 3milliLiter(s) Inhalation every 8 hours  imipenem/cilastatin  IVPB  IV Intermittent   imipenem/cilastatin  IVPB 500milliGRAM(s) IV Intermittent every 6 hours  sodium chloride 0.9% lock flush 20milliLiter(s) IV Push once  albumin human 25% IVPB 50milliLiter(s) IV Intermittent every 8 hours  acyclovir IVPB 550milliGRAM(s) IV Intermittent every 8 hours  propofol Infusion 105.525MICROgram(s)/kG/Min IV Continuous <Continuous>  heparin  Infusion 1800Unit(s)/Hr IV Continuous <Continuous>  linezolid  IVPB  IV Intermittent   linezolid  IVPB 600milliGRAM(s) IV Intermittent every 12 hours    MEDICATIONS  (PRN):  acetaminophen    Suspension. 650milliGRAM(s) Oral every 6 hours PRN Mild Pain (1 - 3)  sodium chloride 0.9% lock flush 10milliLiter(s) IV Push every 1 hour PRN After each medication administration  sodium chloride 0.9% lock flush 10milliLiter(s) IV Push every 12 hours PRN Lumen of catheter NOT used  acetaminophen    Suspension 650milliGRAM(s) Enteral Tube every 6 hours PRN For Temp greater than 38 C (100.4 F)  ondansetron Injectable 4milliGRAM(s) IV Push every 6 hours PRN Nausea and/or Vomiting  HYDROmorphone  Injectable 1milliGRAM(s) IV Push every 4 hours PRN Severe Pain (7 - 10)  HYDROmorphone  Injectable 0.5milliGRAM(s) IV Push every 4 hours PRN Moderate Pain (4 - 6)      Allergies    sulfa drugs (Unknown)  sulfamethoxazole (Other)    Intolerances      FAMILY HISTORY:  No pertinent family history in first degree relatives      Vital Signs Last 24 Hrs  T(C): 37.7, Max: 38.3 (12-24 @ 10:30)  T(F): 99.8, Max: 101 (12-24 @ 10:30)  HR: 92 (81 - 98)  BP: 118/75 (100/59 - 155/85)  BP(mean): 94 (72 - 828)  RR: 15 (14 - 20)  SpO2: 95% (92% - 99%)    PE:  Chronically ill appearing.  Answers questions, follows commands.  HEENT:  NC, PERRL, sclerae anicteric, conjunctivae clear, EOMI.  Sinuses nontender, no nasal exudate.  No buccal or pharyngeal lesions, erythema or exudate  Neck:  Supple, no adenopathy.  Trach site with small area of erythema inferiorly  Lungs:  Clear to auscultation anteriorly  Cor:  RRR, S1, S2, no murmur appreciated  Abd:  Obese, normoactive BS.  Has wound vac over midline wound, PEG, and CHECO drain with dark green material.  Soft, tender to deep palpation in LLQ, no masses, guarding or rebound.  :  Yu catheter in place.   Extrem:  No cyanosis or edema  Skin:  No rashes.    LABS:                        9.0    10.2  )-----------( 320      ( 24 Dec 2016 06:17 )             30.1     24 Dec 2016 06:15    139    |  100    |  17     ----------------------------<  109    3.8     |  30     |  0.48     Ca    8.5        24 Dec 2016 06:15  Phos  2.7       24 Dec 2016 06:15  Mg     2.1       24 Dec 2016 06:15      MICROBIOLOGY:  Blood culture X 1 on 12/16 – NG    Bronchial wash:    12/14 – rare yeast;    12/13- No epith, few WBCs, rare bronchial cells, mod GNR, few GPC in Pr, Chains;  Pseudomonas aeruginosa S Gm, Imi, Tobra R pip/tazo, CAZ, Cp, AZTR  12/9 – mod yeast  12/9 – No epith, mod WBCs, rare yeast    Sputum 12/7:  few epith, numerous WBCs, numerous yeast; mod yeast, routine resp roland absent    Body fluid (abd): 12/11  Strep milleri S CAX, PCN, erythro, vanco      Nasopharynx viral culture 12/14:  HSV 1 detected (I am told this culture was from a bronch)      RADIOLOGY & ADDITIONAL STUDIES:  CXR 12/24:  Portable examination the chest demonstrates improvement degree of   opacification left hemithorax in comparison to prior examination of the   chest 12/24/2016. No interval change position remaining support devices.   Left effusion.    Impression: Improvement aeration left hemithorax. Left effusion.      CT T/A/P 12/21  1.  Interval removal of endoluminal stent from the distal esophagus   extending to the jejunal loop. Interval placement of an esophageal stent.   2.  Slight interval decrease in small to moderate ascites, particularly   in the left upper quadrant surrounding the anastomosis. The largest   pocket measures 1.3 x 6.6 cm in the midline upper pelvis, similar to the   prior study. (NOTE:  body gives size as 11.3 X 6.6 cm)  3.  Further interval decrease in free air, with a a few punctate foci   remaining.  4.  Slight interval decrease in small right pleural effusion. No change   in small left pleural effusion. Associated atelectasis bilaterally,   unchanged.  5.  New anterior abdominal wall defect at the surgical incision site,   presumably from interval debridement.  6.  Mild cardiomegaly and trace pericardial effusion, unchanged.  7.  Diffuse anasarca. 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  ID consult for assistance with determining source and antibiotic selection.  On 11/28, she underwent Harmony-en-Y gastric bypass with removal of remnant stomach and YOSELYN. She initially did well but then on 12/3, was taken to the OR emergently and found to have esophagojejunostomy anastomosis that could not be repaired b/o its location.  She underwent ex-lap with abdominal washout and placement of feeding J-tube distal to the anastomosis.  Post-operatively, she became anuric and had high pressor requirements.  She did not require RRR and her Cr gradually decreased.  She continued to be vent dependent.  On 12/7, she underwent endoscopic stent placement at the anastamosis.  On 12/8, she began having intermittent low grade temp increases.  She had the first of several bronchoscopies for mucous plugging with atelectasis.  CT on 12/11 showed bibasilar atelectasis/consolidation, additional groundglass opacities, hepatomegaly, GB distension with stones vs. sludge, colonic diverticulosis, left adnexal cysts vs. focal fluid collection, apparent interval decrease in multiple loculated rim-enhancing fluid collections throughout the abd and pelvis. On 12/12, she underwent EGD and was found to have a second defect on the enteric side of the antastamosis.  A biliary stent was placed into the esophageal defect as internal drain was covered by an esophageal stent.  The peritoneal defect was washed with hydrogen peroxide. She was extubated on 12/12 but required re-intubation on 12/13.  On 12/13, she had T 102, hypotension felt to be from nosocomial pneumonia with bronchial wash positive for GNR – pip/tazo was d/leida, she was started on imipenem and given one dose of vancomycin.  PICC placed 12/16.  Since that time, she has had intermittent low grade temp increases (T 100-101) with fevers on 12/20, 12/22, 12/23. On 12/20, she was started on acyclovir because a nasopharyngeal viral culture detected HSV 1.  The endoluminal stent was removed from the distal esophagus extending to jejunal loop.  CT on 12/21 noted slight decrease in small to mod ascites, ray around anastomosis.  She has undergone bronch on 12/10, 12/13, 12/16, 12/19, 12/20, 12/24.  She underwent trach on 12/20.    She has received the following antibiotics:  Past:  Metronidazole 11/28-12/2  Cefoxitin 11/29-12/1  Pip/tazo 12/8-12/13  Vancomycin 12/3, 12/13    Current:  Imipenem 12/14-  Fluconazole 12/4  Acyclovir 12/20 -    Linezolid 12/24      PAST MEDICAL & SURGICAL HISTORY:  Reflux  Obesity  DVT (deep venous thrombosis): 2013  Depression  Diverticulitis  Hypertension  Gastric bypass status for obesity: gastric sleeve, 6/2012  S/P breast biopsy: 2011, left  S/P colon resection: 2011  S/P knee surgery: repair 2009, 2011  Umbilical hernia: 2000  S/P appendectomy: 1975  S/P tonsillectomy: 1967          MEDICATIONS  (STANDING):  pantoprazole  Injectable 40milliGRAM(s) IV Push daily  insulin lispro (HumaLOG) corrective regimen sliding scale  SubCutaneous Before meals and at bedtime  chlorhexidine 0.12% Liquid 15milliLiter(s) Swish and Spit two times a day  fluconAZOLE IVPB 200milliGRAM(s) IV Intermittent every 24 hours  heparin  flush 100 Units/mL Injectable 100Unit(s) IV Push every other day  ALBUTerol/ipratropium for Nebulization 3milliLiter(s) Nebulizer every 6 hours  acetylcysteine 20% Inhalation 3milliLiter(s) Inhalation every 8 hours  imipenem/cilastatin  IVPB  IV Intermittent   imipenem/cilastatin  IVPB 500milliGRAM(s) IV Intermittent every 6 hours  sodium chloride 0.9% lock flush 20milliLiter(s) IV Push once  albumin human 25% IVPB 50milliLiter(s) IV Intermittent every 8 hours  acyclovir IVPB 550milliGRAM(s) IV Intermittent every 8 hours  propofol Infusion 105.525MICROgram(s)/kG/Min IV Continuous <Continuous>  heparin  Infusion 1800Unit(s)/Hr IV Continuous <Continuous>  linezolid  IVPB  IV Intermittent   linezolid  IVPB 600milliGRAM(s) IV Intermittent every 12 hours    MEDICATIONS  (PRN):  acetaminophen    Suspension. 650milliGRAM(s) Oral every 6 hours PRN Mild Pain (1 - 3)  sodium chloride 0.9% lock flush 10milliLiter(s) IV Push every 1 hour PRN After each medication administration  sodium chloride 0.9% lock flush 10milliLiter(s) IV Push every 12 hours PRN Lumen of catheter NOT used  acetaminophen    Suspension 650milliGRAM(s) Enteral Tube every 6 hours PRN For Temp greater than 38 C (100.4 F)  ondansetron Injectable 4milliGRAM(s) IV Push every 6 hours PRN Nausea and/or Vomiting  HYDROmorphone  Injectable 1milliGRAM(s) IV Push every 4 hours PRN Severe Pain (7 - 10)  HYDROmorphone  Injectable 0.5milliGRAM(s) IV Push every 4 hours PRN Moderate Pain (4 - 6)      Allergies    sulfa drugs (Unknown)  sulfamethoxazole (Other)    Intolerances      FAMILY HISTORY:  No pertinent family history in first degree relatives    ROS:  No HA, photophobia, neck stiffness, cough, CP, SOB, N, V, abd pain, diarrhea, dysuria      Vital Signs Last 24 Hrs  T(C): 37.7, Max: 38.3 (12-24 @ 10:30)  T(F): 99.8, Max: 101 (12-24 @ 10:30)  HR: 92 (81 - 98)  BP: 118/75 (100/59 - 155/85)  BP(mean): 94 (72 - 828)  RR: 15 (14 - 20)  SpO2: 95% (92% - 99%)    PE:  Chronically ill appearing.  Answers questions, follows commands.  HEENT:  NC, PERRL, sclerae anicteric, conjunctivae clear, EOMI.  Sinuses nontender, no nasal exudate.  No buccal or pharyngeal lesions, erythema or exudate  Neck:  Supple, no adenopathy.  Trach site with small area of erythema inferiorly  Lungs:  Clear to auscultation anteriorly  Cor:  RRR, S1, S2, no murmur appreciated  Abd:  Obese, normoactive BS.  Has wound vac over midline wound, PEG, and CHECO drain with dark green material.  Soft, tender to deep palpation in LLQ, no masses, guarding or rebound.  :  Yu catheter in place.   Extrem:  No cyanosis or edema  Skin:  No rashes.    LABS:                        9.0    10.2  )-----------( 320      ( 24 Dec 2016 06:17 )             30.1     24 Dec 2016 06:15    139    |  100    |  17     ----------------------------<  109    3.8     |  30     |  0.48     Ca    8.5        24 Dec 2016 06:15  Phos  2.7       24 Dec 2016 06:15  Mg     2.1       24 Dec 2016 06:15      MICROBIOLOGY:  Blood culture X 1 on 12/16 – NG    Bronchial wash:    12/14 – rare yeast;    12/13- No epith, few WBCs, rare bronchial cells, mod GNR, few GPC in Pr, Chains;  Pseudomonas aeruginosa S Gm, Imi, Tobra R pip/tazo, CAZ, Cp, AZTR  12/9 – mod yeast  12/9 – No epith, mod WBCs, rare yeast    Sputum 12/7:  few epith, numerous WBCs, numerous yeast; mod yeast, routine resp roland absent    Body fluid (abd): 12/11  Strep milleri S CAX, PCN, erythro, vanco      Nasopharynx viral culture 12/14:  HSV 1 detected (I am told this culture was from a bronch)      RADIOLOGY & ADDITIONAL STUDIES:  CXR 12/24:  Portable examination the chest demonstrates improvement degree of   opacification left hemithorax in comparison to prior examination of the   chest 12/24/2016. No interval change position remaining support devices.   Left effusion.    Impression: Improvement aeration left hemithorax. Left effusion.      CT T/A/P 12/21  1.  Interval removal of endoluminal stent from the distal esophagus   extending to the jejunal loop. Interval placement of an esophageal stent.   2.  Slight interval decrease in small to moderate ascites, particularly   in the left upper quadrant surrounding the anastomosis. The largest   pocket measures 1.3 x 6.6 cm in the midline upper pelvis, similar to the   prior study. (NOTE:  body gives size as 11.3 X 6.6 cm)  3.  Further interval decrease in free air, with a a few punctate foci   remaining.  4.  Slight interval decrease in small right pleural effusion. No change   in small left pleural effusion. Associated atelectasis bilaterally,   unchanged.  5.  New anterior abdominal wall defect at the surgical incision site,   presumably from interval debridement.  6.  Mild cardiomegaly and trace pericardial effusion, unchanged.  7.  Diffuse anasarca.

## 2016-12-25 NOTE — PROGRESS NOTE ADULT - ASSESSMENT
56 year old critical patient s/p SIPs procedure with free air concerning for anastomotic leak s/p emergent ex lap with abdominal washout found to have leak of esophagojejunostomy which they could not repair due to its location.  Patient s/p esophageal stent placement, peritoneal washout and internal double pigtail stent placement. During endoscopy second defect noted on enteric side of anastomosis. Patient also with PNA.  Repeat endoscopy removed stent and placed a second stent over entire 5 cm anastomotic defect. Stent stitched in place. Decreasing CHECO output suggestive of proper placement. \      - Abx/ pain control per SICU  - monitor CHECO output  - Strict NPO to ensure no stent migration and healing of defect   - cont J tube feeds for nutrition      GI following 56 year old critical patient s/p SIPs procedure with free air concerning for anastomotic leak s/p emergent ex lap with abdominal washout found to have leak of esophagojejunostomy which they could not repair due to its location.  Patient s/p esophageal stent placement, peritoneal washout and internal double pigtail stent placement. During endoscopy second defect noted on enteric side of anastomosis. Patient also with PNA.  Repeat endoscopy removed stent and placed a second stent over entire 5 cm anastomotic defect. Stent stitched in place. Decreasing CHECO output suggestive of proper placement.       - Abx/ pain control per SICU  - monitor CHECO output  - Strict NPO to ensure no stent migration and healing of defect   - cont J tube feeds for nutrition      GI following

## 2016-12-25 NOTE — PROGRESS NOTE ADULT - ASSESSMENT
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     Neuro: Dilaudid PRN, Tylenol PRN, Lexapro 20mg daily, propofol gtt   CVS: MAP > 70, normotensive goals. abumin 25%q8h    Pulm: Trach /60/12/5. Peridex. Duonebs. Mucomyst.  FEN/GI: NPO. J-tube TF to goal. Protonix.   : Yu. Post-op ATN - Lasix PRN.  Endo: ISS.   ID: MDR Pseudomonas (sputum) -  Imipenem (12/14--),Bronch wash 12/20 \\ DC Vanc (12/13-14), Zosyn (12/3-14), Linezolid (12/24-25),  fluconazole (12/4-25), - H. simplex Acyclovir (12/20-25)   Heme: SCDs, Heparin gtt goal 60-80  Lines/drains: Left IJ TLC (12/3--), left IJ Juanjo (12/6-12/14). CHECO x 2. Feeding J-tube.  Wounds: Midline abdominal wound - wound vac (change MW).

## 2016-12-26 NOTE — PROGRESS NOTE ADULT - SUBJECTIVE AND OBJECTIVE BOX
S: Pt reports no complaints, no CP or SOB    Vitals: Vital Signs Last 24 Hrs  T(C): 36.9, Max: 38.6 (12-25 @ 17:45)  T(F): 98.5, Max: 101.4 (12-25 @ 17:45)  HR: 87 (80 - 101)  BP: 135/69 (92/68 - 159/79)  BP(mean): 90 (76 - 112)  RR: 18 (14 - 21)  SpO2: 100% (95% - 100%)    CAPILLARY BLOOD GLUCOSE  96 (25 Dec 2016 16:00)  111 (25 Dec 2016 11:00)        I & Os for current day (as of 12-26 @ 07:34)  =============================================  IN: 1893 ml / OUT: 2125 ml / NET: -232 ml          Labs:                         8.8    10.9  )-----------( 375      ( 26 Dec 2016 06:16 )             29.0   26 Dec 2016 06:17    135    |  97     |  13     ----------------------------<  92     3.9     |  30     |  0.40     Ca    8.8        26 Dec 2016 06:17  Phos  3.1       26 Dec 2016 06:17  Mg     2.2       26 Dec 2016 06:17    PT/INR - ( 25 Dec 2016 07:08 )   PT: 13.8 sec;   INR: 1.24          PTT - ( 26 Dec 2016 06:17 )  PTT:56.4 sec    Electrolytes repleated to goals as follows: Potassium 4, Phosphorus 3 and Magnesium 2 where appropriate and necessary    Exam:  Neuro: A&Ox3, NAD, grossly neuro intact  CV: RRR, no MRGs  Pulm: decreased L sided breath sounds, rhonchi on bilateral bases  Abd: BS hypoactive, central incision with vac, feculent smell, Soft, diffuse ttp, moderately distended  Ext: 3+ global edema  Vasc: Extremities warm to palpation, 2+ pulses - radial and PT

## 2016-12-26 NOTE — PROGRESS NOTE ADULT - ASSESSMENT
Fever - etiology not completely clear if from lung vs abd source.  Regarding lung, her chest CT from 12/21 did not really show pneumonia and she has effusions.  However, CXR (read as improving) is very difficult to assess because of effusions and the bronch sample had many WBCs to accompany this XDR Pseudomonas that is susceptible only to the aminoglycosides among the drugs tested and these do not penetrate lung well.  She also has what appears to be purulent material coming out around feeding tube with erythema at site - I have no idea where this is coming from - and pockets of ascites on CT that could be infected.  Suggest  - Continue imipenem - may have some activity even though resistant  - For now, add gentamicin 130 mg iv q8h.  Test peak 30 min after 3rd dose and trough immediately before 4th.  [Note:  she received a loading dose of tobra because I had thought the isolate was gentamicin resistant - it is not.]  Goal peak >8 and trough <2.   - I have requested susceptibility testing for polymyxin, rifampin, tigecyclin, fosfomycin, CAZ/lowell - will follow up with micro lab.  Would want to start another active agent quickly to slow emergence of resistance to gent, as well as to treat her more effectively.  - For eval of feeding tube site by primary team - may need more imaging?  If so, would also do lungs.  Recommendations discussed with primary team.  Will continue to follow with you.

## 2016-12-26 NOTE — PROGRESS NOTE ADULT - SUBJECTIVE AND OBJECTIVE BOX
INTERVAL HPI/OVERNIGHT EVENTS:  Again febrile with BPs intermittently dropping but coming up without intervention.  Fluconazole and linezolid were d/leida.  Per pt, has no pain.  Breathing feels better.    CONSTITUTIONAL:  No fever, chills, night sweats  EYES:  No photophobia or visual changes  CARDIOVASCULAR:  No chest pain  RESPIRATORY:  No cough, wheezing, or SOB   GASTROINTESTINAL:  No nausea, vomiting, diarrhea, constipation, or abdominal pain  NEUROLOGIC:  No headache, lightheadedness      MEDICATIONS  (STANDING):  pantoprazole  Injectable 40milliGRAM(s) IV Push daily  insulin lispro (HumaLOG) corrective regimen sliding scale  SubCutaneous Before meals and at bedtime  chlorhexidine 0.12% Liquid 15milliLiter(s) Swish and Spit two times a day  heparin  flush 100 Units/mL Injectable 100Unit(s) IV Push every other day  ALBUTerol/ipratropium for Nebulization 3milliLiter(s) Nebulizer every 6 hours  acetylcysteine 20% Inhalation 3milliLiter(s) Inhalation every 8 hours  imipenem/cilastatin  IVPB  IV Intermittent   imipenem/cilastatin  IVPB 500milliGRAM(s) IV Intermittent every 6 hours  sodium chloride 0.9% lock flush 20milliLiter(s) IV Push once  albumin human 25% IVPB 50milliLiter(s) IV Intermittent every 8 hours  heparin  Infusion 1850Unit(s)/Hr IV Continuous <Continuous>  tobramycin IVPB     tobramycin IVPB 130milliGRAM(s) IV Intermittent every 8 hours    MEDICATIONS  (PRN):  acetaminophen    Suspension. 650milliGRAM(s) Oral every 6 hours PRN Mild Pain (1 - 3)  sodium chloride 0.9% lock flush 10milliLiter(s) IV Push every 1 hour PRN After each medication administration  sodium chloride 0.9% lock flush 10milliLiter(s) IV Push every 12 hours PRN Lumen of catheter NOT used  acetaminophen    Suspension 650milliGRAM(s) Enteral Tube every 6 hours PRN For Temp greater than 38 C (100.4 F)  ondansetron Injectable 4milliGRAM(s) IV Push every 6 hours PRN Nausea and/or Vomiting  HYDROmorphone  Injectable 1milliGRAM(s) IV Push every 4 hours PRN Severe Pain (7 - 10)  HYDROmorphone  Injectable 0.5milliGRAM(s) IV Push every 4 hours PRN Moderate Pain (4 - 6)        Vital Signs Last 24 Hrs  T(C): 38, Max: 38.6 (12-25 @ 17:45)  T(F): 100.4, Max: 101.4 (12-25 @ 17:45)  HR: 91 (80 - 101)  BP: 109/66 (103/59 - 155/75)  BP(mean): 82 (71 - 938)  RR: 14 (14 - 21)  SpO2: 97% (92% - 100%)    PHYSICAL EXAM:  Constitutional:  Ill appearing, mildly diaphoretic  Eyes:  Sclerae anicterica, conjunctivae clear, CL, EOMI  Ear/Nose/Throat:  No nasal exudate or sinus tenderness;  No buccal mucosal lesions, no pharyngeal erythema or exudate	  Neck:  Supple, no adenopathy  Respiratory:  Coarse BS anteriorly  Cardiovascular:  RRR, S1S2, no murmur appreciated  Gastrointestinal:  Obese, midline wound vac, RUQ drain, coupious material around feeding tube right mid abd - appears purulent.  Diffusely tender to palpation without rebound  Extremities:  No edema      LABS:                        8.8    10.9  )-----------( 375      ( 26 Dec 2016 06:16 )             29.0     26 Dec 2016 06:17    135    |  97     |  13     ----------------------------<  92     3.9     |  30     |  0.40     Ca    8.8        26 Dec 2016 06:17  Phos  3.1       26 Dec 2016 06:17  Mg     2.2       26 Dec 2016 06:17    MICROBIOLOGY:  Blood cx X 2 from 12/24:  NGTD  Bronch culture 12/24:  Rare bronchial cells, numerous WBCs numerous GNR;  Pseudomonas aeruginos S Gm, Tobra, R Aztr, CAZ, Cp, Imi, Pip-tazo      RADIOLOGY & ADDITIONAL STUDIES:  CXR:  Impression: Improvement of opacification left hemithorax in comparison to   prior examination of the chest 12/25/2016. Bilateral effusions.   Underlying infiltrates cannot be excluded.

## 2016-12-27 NOTE — PROGRESS NOTE ADULT - SUBJECTIVE AND OBJECTIVE BOX
S: No new issues/events overnight, no new med c/o    O: ICU Vital Signs Last 24 Hrs  T(F): 98.4, Max: 101.2 (12-26 @ 17:59)  HR: 80 (76 - 96)  BP: 124/69 (103/59 - 149/75)  BP(mean): 91 (71 - 938)  ABP: --  RR: 14 (13 - 24)  SpO2: 98% (92% - 100%)  Wt(kg): --    PHYSICAL EXAM:     Neurological: AAOx3, CNII-XII intact,  strength 5/5 b/l  Cardiovascular: RRR  Respiratory: CTA, decreased breath sounds at bases   Gastrointestinal: soft, NT, ND, BS+, wound vac with yellowish thick drainage from dressing, J-tube with similar yellowish thick drainage around opening, Right CHECO with yellowish/light greenish thick drainage .   Extremities: warm, trace dependent edema  Vascular: no cyanosis/erythema    LABS:    27 Dec 2016 06:56    137    |  98     |  15     ----------------------------<  101    4.0     |  30     |  0.39     Ca    9.1        27 Dec 2016 06:56  Phos  3.2       27 Dec 2016 06:56  Mg     2.3       27 Dec 2016 06:56                          8.5    11.9  )-----------( 353      ( 27 Dec 2016 06:56 )             28.3   PTT - ( 27 Dec 2016 06:56 )  PTT:60.3 sec  CAPILLARY BLOOD GLUCOSE  98 (26 Dec 2016 22:00)  117 (26 Dec 2016 17:00)    MEDICATIONS  (STANDING):  pantoprazole  Injectable 40milliGRAM(s) IV Push daily  insulin lispro (HumaLOG) corrective regimen sliding scale  SubCutaneous Before meals and at bedtime  chlorhexidine 0.12% Liquid 15milliLiter(s) Swish and Spit two times a day  heparin  flush 100 Units/mL Injectable 100Unit(s) IV Push every other day  ALBUTerol/ipratropium for Nebulization 3milliLiter(s) Nebulizer every 6 hours  acetylcysteine 20% Inhalation 3milliLiter(s) Inhalation every 8 hours  imipenem/cilastatin  IVPB  IV Intermittent   imipenem/cilastatin  IVPB 500milliGRAM(s) IV Intermittent every 6 hours  sodium chloride 0.9% lock flush 20milliLiter(s) IV Push once  albumin human 25% IVPB 50milliLiter(s) IV Intermittent every 8 hours  heparin  Infusion 1850Unit(s)/Hr IV Continuous <Continuous>  gentamicin   IVPB     gentamicin   IVPB 120milliGRAM(s) IV Intermittent every 8 hours    MEDICATIONS  (PRN):  acetaminophen    Suspension. 650milliGRAM(s) Oral every 6 hours PRN Mild Pain (1 - 3)  sodium chloride 0.9% lock flush 10milliLiter(s) IV Push every 1 hour PRN After each medication administration  sodium chloride 0.9% lock flush 10milliLiter(s) IV Push every 12 hours PRN Lumen of catheter NOT used  acetaminophen    Suspension 650milliGRAM(s) Enteral Tube every 6 hours PRN For Temp greater than 38 C (100.4 F)  ondansetron Injectable 4milliGRAM(s) IV Push every 6 hours PRN Nausea and/or Vomiting  HYDROmorphone  Injectable 1milliGRAM(s) IV Push every 4 hours PRN Severe Pain (7 - 10)  HYDROmorphone  Injectable 0.5milliGRAM(s) IV Push every 4 hours PRN Moderate Pain (4 - 6)      Yu:	  [ ] None	[x ] Daily Yu Order Placed	   Indication:	  [x ] Strict I and O's    [ ] Obstruction     [ ] Incontinence + Stage 3 or 4 Decubitus  Central Line:  [ ] None	   [x ]  Medication / TPN Administration     [ ] No Peripheral IV S: No new issues/events overnight, no new med c/o    O: ICU Vital Signs Last 24 Hrs  T(F): 98.4, Max: 101.2 (12-26 @ 17:59)  HR: 80 (76 - 96)  BP: 124/69 (103/59 - 149/75)  BP(mean): 91 (71 - 938)  ABP: --  RR: 14 (13 - 24)  SpO2: 98% (92% - 100%)  Wt(kg): --    PHYSICAL EXAM:     Neurological: AAOx3, CNII-XII intact,  strength 5/5 b/l  Cardiovascular: RRR  Respiratory: CTA, decreased breath sounds at bases   Gastrointestinal: soft, NT, ND, BS+, wound vac with yellowish thick drainage from dressing, J-tube with similar yellowish thick drainage around opening, Right CHECO with yellowish/light greenish thick drainage .   Extremities: warm, trace dependent edema  Vascular: no cyanosis/erythema    LABS:    27 Dec 2016 06:56    137    |  98     |  15     ----------------------------<  101    4.0     |  30     |  0.39     Ca    9.1        27 Dec 2016 06:56  Phos  3.2       27 Dec 2016 06:56  Mg     2.3       27 Dec 2016 06:56                          8.5    11.9  )-----------( 353      ( 27 Dec 2016 06:56 )             28.3   PTT - ( 27 Dec 2016 06:56 )  PTT:60.3 sec  CAPILLARY BLOOD GLUCOSE  98 (26 Dec 2016 22:00)  117 (26 Dec 2016 17:00)    MEDICATIONS  (STANDING):  pantoprazole  Injectable 40milliGRAM(s) IV Push daily  insulin lispro (HumaLOG) corrective regimen sliding scale  SubCutaneous Before meals and at bedtime  chlorhexidine 0.12% Liquid 15milliLiter(s) Swish and Spit two times a day  heparin  flush 100 Units/mL Injectable 100Unit(s) IV Push every other day  ALBUTerol/ipratropium for Nebulization 3milliLiter(s) Nebulizer every 6 hours  acetylcysteine 20% Inhalation 3milliLiter(s) Inhalation every 8 hours  imipenem/cilastatin  IVPB 500milliGRAM(s) IV Intermittent every 6 hours  sodium chloride 0.9% lock flush 20milliLiter(s) IV Push once  albumin human 25% IVPB 50milliLiter(s) IV Intermittent every 8 hours  heparin  Infusion 1850Unit(s)/Hr IV Continuous <Continuous>   gentamicin   IVPB 120milliGRAM(s) IV Intermittent every 8 hours    MEDICATIONS  (PRN):  acetaminophen    Suspension. 650milliGRAM(s) Oral every 6 hours PRN Mild Pain (1 - 3)  acetaminophen    Suspension 650milliGRAM(s) Enteral Tube every 6 hours PRN For Temp greater than 38 C (100.4 F)  ondansetron Injectable 4milliGRAM(s) IV Push every 6 hours PRN Nausea and/or Vomiting  HYDROmorphone  Injectable 1milliGRAM(s) IV Push every 4 hours PRN Severe Pain (7 - 10)  HYDROmorphone  Injectable 0.5milliGRAM(s) IV Push every 4 hours PRN Moderate Pain (4 - 6)      Yu:	  [ ] None	[x ] Daily Yu Order Placed	   Indication:	  [x ] Strict I and O's    [ ] Obstruction     [ ] Incontinence + Stage 3 or 4 Decubitus  Central Line:  [ ] None	   [x ]  Medication / TPN Administration     [ ] No Peripheral IV

## 2016-12-27 NOTE — PROGRESS NOTE ADULT - ATTENDING COMMENTS
comfortable on cpap 5 ps 8. Remains febrile 101.1 and for evaluation today for fistula per surgery. Continue abx per ID and J tube feeds.  Check UUn and prealb. continue  anticoagulation as outlined..

## 2016-12-27 NOTE — PROGRESS NOTE ADULT - PROBLEM SELECTOR PLAN 1
I reviewed the CT scan of the chest and the patient has a larghetto left The effusion with compression atelectasis of the left lower lobe.  CT drainage in am

## 2016-12-27 NOTE — PROGRESS NOTE ADULT - ASSESSMENT
56 year old critical patient s/p SIPs procedure with free air concerning for anastomotic leak s/p emergent ex lap with abdominal washout found to have leak of esophagojejunostomy which they could not repair due to its location.  Patient s/p esophageal stent placement, peritoneal washout and internal double pigtail stent placement. During endoscopy second defect noted on enteric side of anastomosis. Patient also with PNA.  Repeat endoscopy removed stent and placed a second stent over entire 5 cm anastomotic defect. Stent stitched in place. Decreasing CHECO output suggestive of proper placement. 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       - Abx/ pain control per SICU  - monitor CHECO output  - Strict NPO to ensure no stent migration and healing of defect, will consider stopping J tube feeds as close to fistula site   - defer to SICU team for management       GI following

## 2016-12-27 NOTE — PROGRESS NOTE ADULT - ASSESSMENT
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 abumin 25%q8h    Pulm: Trach /60/12/5. Peridex. Duonebs. Mucomyst. CPAP trial,   FEN/GI: NPO. J-tube TF Osmolite at goal. Protonix.   : Yu. Post-op ATN. keep I&O negative. had been getting lasix 40 qd for past few days, will cont with lasix to keep negative.   Endo: ISS.   ID: ID consult appreciated. MDR Pseudomonas (sputum), switched to gentamicin (12/26-) cont Imipenem (12/14--) for now, \\\ DC Vanc (12/13-14), Zosyn (12/3-14), Linezolid (12/24-25),  fluconazole (12/4-25), - H. simplex Acyclovir (12/20-25)   Heme: SCDs, Heparin gtt goal 60-80  Lines/drains: Left IJ TLC (12/3--), left IJ Juanjo (12/6-12/14). CHECO x 2. Feeding J-tube.  Wounds: Midline abdominal wound - wound vac, thick purulent drainage around J-tube and out of Right CHECO. Surgery team aware.   Need aggressive PT/OOB to chair

## 2016-12-27 NOTE — PROGRESS NOTE ADULT - SUBJECTIVE AND OBJECTIVE BOX
Interval Events:  Patient seen and examined at bedside.    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 (30 Nov 2016 17:30)      PAST MEDICAL & SURGICAL HISTORY:  Reflux  Obesity  DVT (deep venous thrombosis): 2013  Depression  Diverticulitis  Hypertension  Gastric bypass status for obesity: gastric sleeve, 6/2012  S/P breast biopsy: 2011, left  S/P colon resection: 2011  S/P knee surgery: repair 2009, 2011  Umbilical hernia: 2000  S/P appendectomy: 1975  S/P tonsillectomy: 1967      MEDICATIONS:  Pulmonary:  ALBUTerol/ipratropium for Nebulization 3milliLiter(s) Nebulizer every 6 hours  acetylcysteine 20% Inhalation 3milliLiter(s) Inhalation every 8 hours    Antimicrobials:  imipenem/cilastatin  IVPB  IV Intermittent   imipenem/cilastatin  IVPB 500milliGRAM(s) IV Intermittent every 6 hours  gentamicin   IVPB 150milliGRAM(s) IV Intermittent every 8 hours    Anticoagulants:  heparin  flush 100 Units/mL Injectable 100Unit(s) IV Push every other day  heparin  Infusion 1850Unit(s)/Hr IV Continuous <Continuous>    Cardiac:      Allergies    sulfa drugs (Unknown)  sulfamethoxazole (Other)    Intolerances        Vital Signs Last 24 Hrs  T(C): 36.4, Max: 37.8 (12-27 @ 17:42)  T(F): 97.6, Max: 100 (12-27 @ 17:42)  HR: 79 (71 - 96)  BP: 137/83 (103/65 - 156/85)  BP(mean): 99 (78 - 107)  RR: 24 (13 - 30)  SpO2: 95% (92% - 100%)  I & Os for 24h ending 12-27 @ 07:00  =============================================  IN: 2873 ml / OUT: 2402 ml / NET: 471 ml    I & Os for current day (as of 12-27 @ 23:26)  =============================================  IN: 1117 ml / OUT: 2065 ml / NET: -948 ml    Mode: CPAP with PS  FiO2: 40  PEEP: 5  PS: 8  MAP: 6.4  PIP: 13      LABS:      CBC Full  -  ( 27 Dec 2016 06:56 )  WBC Count : 11.9 K/uL  Hemoglobin : 8.5 g/dL  Hematocrit : 28.3 %  Platelet Count - Automated : 353 K/uL  Mean Cell Volume : 84.5 fL  Mean Cell Hemoglobin : 25.4 pg  Mean Cell Hemoglobin Concentration : 30.0 g/dL  Auto Neutrophil # : x  Auto Lymphocyte # : x  Auto Monocyte # : x  Auto Eosinophil # : x  Auto Basophil # : x  Auto Neutrophil % : 60.0 %  Auto Lymphocyte % : 15.0 %  Auto Monocyte % : 4.0 %  Auto Eosinophil % : 1.0 %  Auto Basophil % : x    27 Dec 2016 06:56    137    |  98     |  15     ----------------------------<  101    4.0     |  30     |  0.39     Ca    9.1        27 Dec 2016 06:56  Phos  3.2       27 Dec 2016 06:56  Mg     2.3       27 Dec 2016 06:56      PTT - ( 27 Dec 2016 06:56 )  PTT:60.3 sec                    RADIOLOGY & ADDITIONAL STUDIES (The following images were personally reviewed):  Yu:                            Yes        No  Urine output:               Yes        No  DVT prophylaxis:         Yes        No  Flattus:                          Yes        No  Bowel movement:       Yes        No Interval Events:  Patient seen and examined at bedside.    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 (30 Nov 2016 17:30)    she is OK on the ventilator and does not seem to be in distress  PAST MEDICAL & SURGICAL HISTORY:  Reflux  Obesity  DVT (deep venous thrombosis): 2013  Depression  Diverticulitis  Hypertension  Gastric bypass status for obesity: gastric sleeve, 6/2012  S/P breast biopsy: 2011, left  S/P colon resection: 2011  S/P knee surgery: repair 2009, 2011  Umbilical hernia: 2000  S/P appendectomy: 1975  S/P tonsillectomy: 1967      MEDICATIONS:  Pulmonary:  ALBUTerol/ipratropium for Nebulization 3milliLiter(s) Nebulizer every 6 hours  acetylcysteine 20% Inhalation 3milliLiter(s) Inhalation every 8 hours    Antimicrobials:  imipenem/cilastatin  IVPB  IV Intermittent   imipenem/cilastatin  IVPB 500milliGRAM(s) IV Intermittent every 6 hours  gentamicin   IVPB 150milliGRAM(s) IV Intermittent every 8 hours    Anticoagulants:  heparin  flush 100 Units/mL Injectable 100Unit(s) IV Push every other day  heparin  Infusion 1850Unit(s)/Hr IV Continuous <Continuous>    Cardiac:      Allergies    sulfa drugs (Unknown)  sulfamethoxazole (Other)    Intolerances        Vital Signs Last 24 Hrs  T(C): 36.4, Max: 37.8 (12-27 @ 17:42)  T(F): 97.6, Max: 100 (12-27 @ 17:42)  HR: 79 (71 - 96)  BP: 137/83 (103/65 - 156/85)  BP(mean): 99 (78 - 107)  RR: 24 (13 - 30)  SpO2: 95% (92% - 100%)  I & Os for 24h ending 12-27 @ 07:00  =============================================  IN: 2873 ml / OUT: 2402 ml / NET: 471 ml    I & Os for current day (as of 12-27 @ 23:26)  =============================================  IN: 1117 ml / OUT: 2065 ml / NET: -948 ml    Mode: CPAP with PS  FiO2: 40  PEEP: 5  PS: 8  MAP: 6.4  PIP: 13      LABS:      CBC Full  -  ( 27 Dec 2016 06:56 )  WBC Count : 11.9 K/uL  Hemoglobin : 8.5 g/dL  Hematocrit : 28.3 %  Platelet Count - Automated : 353 K/uL  Mean Cell Volume : 84.5 fL  Mean Cell Hemoglobin : 25.4 pg  Mean Cell Hemoglobin Concentration : 30.0 g/dL  Auto Neutrophil # : x  Auto Lymphocyte # : x  Auto Monocyte # : x  Auto Eosinophil # : x  Auto Basophil # : x  Auto Neutrophil % : 60.0 %  Auto Lymphocyte % : 15.0 %  Auto Monocyte % : 4.0 %  Auto Eosinophil % : 1.0 %  Auto Basophil % : x    27 Dec 2016 06:56    137    |  98     |  15     ----------------------------<  101    4.0     |  30     |  0.39     Ca    9.1        27 Dec 2016 06:56  Phos  3.2       27 Dec 2016 06:56  Mg     2.3       27 Dec 2016 06:56      PTT - ( 27 Dec 2016 06:56 )  PTT:60.3 sec          INTERPRETATION:  CT of the chest, abdomen and pelvis with contrast dated   12/27/2016 3:15 PM    INDICATION: Gastrointestinal leak. Status post bariatric surgery.    TECHNIQUE: CT of the chest, abdomen and pelvis was performed using   intravenous contrast.  Axial, sagittal and coronal images were produced   and reviewed.    PRIOR STUDIES: 12/21/2016, 12/3/2016 and 10/4/2016.    FINDINGS: Tracheostomy tube is in place.     Heart size is mildly enlarged. No significant pericardial effusion is   seen. Aortic root measures 3.6 cm. Ascending aorta is borderline ectatic   measuring 3.9cm. Aortic arch measures 2.7 cm. Descending thoracic aorta   measures 3.2 cm. No mediastinal, hilar or axillary lymphadenopathy seen.    There is a small right pleural effusion, unchanged. There is a moderate   to large left pleural effusion, enlarged since prior study. There is near   complete atelectasis of the right lower lobe. Left lower lobe is   completely atelectatic. Dependent atelectasis is also noted in the left   upper lobe.    1.5 cm ill-defined hypodense change in hepatic segment 5 is unchanged.   Portal vein is patent.  No radiopaque stones are seen in the gallbladder.    The pancreas is normal in appearance.  Spleen is mildly enlarged   measuring 13.2 cm. No significant focal or splenic abnormality is seen at   this time.    The adrenal glands are unremarkable. The right kidney is unremarkable. 1   cm cyst is again seen in the midportion of the right kidney. There is   again a right parapelvic cyst versus dilated extrarenal pelvis.    The patient is again status post gastrectomy and gastrojejunal   anastomosis. There is also evidence of partial colectomy and colorectal   anastomosis. Endoluminal stent extends from the distal esophagus to the   proximal jejunal loop. Tip of NG tube is within the proximal portion of   thestent. A percutaneous drain enters the right lower quadrant and   extends to the left upper quadrant with the tip located in the left lower   quadrant. Tip of the percutaneous jejunostomy tube is within a jejunal   loop in the midabdomen. There is oral contrast outside the distal segment   of the gastrojejunal stent which is suspicious for leakage. There is   opacification of the open anterior surgical wound with oral contrast   consistent with enterocutaneous fistula. Small bowel loops are adherent   to the anterior abdominal wall. Exact fistula tract is not delineated.   Mesenteric infiltration is seen in the mid abdomen and upper pelvis.   There is again a large mesenteric fluid collection with rim enhancement   in the upper pelvis measuring 10.5 x 6.5 cm. A smaller crescentic   rim-enhancing collection is seen in the left side of the upper pelvis   measuring 5.8 x 2.4 cm. More inferiorly in the pelvis, there is a 3.7 cm   collection of fluid. There is no evidence of bowel obstruction.No free   intraperitoneal air is seen. There is colonic diverticulosis.    Images of the pelvis demonstrate a Yu catheter in an otherwise empty   bladder. A rectal catheter is in place.    Evaluation of the osseous structures demonstrates degenerative changes.   There is mild anterolisthesis of L4 on L5.      IMPRESSION:  Status post gastrectomy and gastrojejunal anastomosis with a stent in   place. Oral contrast outside the lumen of the stent is suspicious for   leakage.    Leaked oral contrast along the open anterior abdominal surgical wound   suggestive of enterocutaneous fistula.    A few rim-enhancing fluid collections in the lower abdomen and pelvis   which might be infected.    Moderate to large left pleural effusion, enlarged sinceprior study.   Small right pleural effusion. Extensive atelectatic changes in the lungs,   left greater than right.            RADIOLOGY & ADDITIONAL STUDIES (The following images were personally reviewed):  Yu:                            Yes          Urine output:               Yes          Flattus:                          Yes          Bowel movement:       Yes        N

## 2016-12-27 NOTE — PROGRESS NOTE ADULT - SUBJECTIVE AND OBJECTIVE BOX
Pt seen and examined at bedside, patient noted that she denies abdominal pain this morning and has been afebrile overnight. 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 CHECO output between 10-20 cc overnight. Stent still overlying defect.     REVIEW OF SYSTEMS:  Constitutional: No fever, weight loss or fatigue  Cardiovascular: No chest pain, palpitations, dizziness or leg swelling  Gastrointestinal: No abdominal or epigastric pain. No nausea, vomiting or hematemesis; No diarrhea or constipation. No melena or hematochezia.  Skin: No itching, burning, rashes or lesions       MEDICATIONS:  MEDICATIONS  (STANDING):  pantoprazole  Injectable 40milliGRAM(s) IV Push daily  insulin lispro (HumaLOG) corrective regimen sliding scale  SubCutaneous Before meals and at bedtime  chlorhexidine 0.12% Liquid 15milliLiter(s) Swish and Spit two times a day  heparin  flush 100 Units/mL Injectable 100Unit(s) IV Push every other day  ALBUTerol/ipratropium for Nebulization 3milliLiter(s) Nebulizer every 6 hours  acetylcysteine 20% Inhalation 3milliLiter(s) Inhalation every 8 hours  imipenem/cilastatin  IVPB  IV Intermittent   imipenem/cilastatin  IVPB 500milliGRAM(s) IV Intermittent every 6 hours  sodium chloride 0.9% lock flush 20milliLiter(s) IV Push once  albumin human 25% IVPB 50milliLiter(s) IV Intermittent every 8 hours  heparin  Infusion 1850Unit(s)/Hr IV Continuous <Continuous>  gentamicin   IVPB     gentamicin   IVPB 120milliGRAM(s) IV Intermittent every 8 hours  furosemide   Injectable 40milliGRAM(s) IV Push once    MEDICATIONS  (PRN):  acetaminophen    Suspension. 650milliGRAM(s) Oral every 6 hours PRN Mild Pain (1 - 3)  sodium chloride 0.9% lock flush 10milliLiter(s) IV Push every 1 hour PRN After each medication administration  sodium chloride 0.9% lock flush 10milliLiter(s) IV Push every 12 hours PRN Lumen of catheter NOT used  acetaminophen    Suspension 650milliGRAM(s) Enteral Tube every 6 hours PRN For Temp greater than 38 C (100.4 F)  ondansetron Injectable 4milliGRAM(s) IV Push every 6 hours PRN Nausea and/or Vomiting  HYDROmorphone  Injectable 1milliGRAM(s) IV Push every 4 hours PRN Severe Pain (7 - 10)  HYDROmorphone  Injectable 0.5milliGRAM(s) IV Push every 4 hours PRN Moderate Pain (4 - 6)      Allergies    sulfa drugs (Unknown)  sulfamethoxazole (Other)    Intolerances        Vital Signs Last 24 Hrs  T(C): 36.9, Max: 38.4 (12-26 @ 17:59)  T(F): 98.4, Max: 101.2 (12-26 @ 17:59)  HR: 93 (71 - 96)  BP: 118/71 (103/59 - 149/75)  BP(mean): 85 (71 - 938)  RR: 21 (13 - 24)  SpO2: 95% (93% - 100%)  I & Os for 24h ending 12-27 @ 07:00  =============================================  IN: 2873 ml / OUT: 2402 ml / NET: 471 ml    I & Os for current day (as of 12-27 @ 09:55)  =============================================  IN: 180 ml / OUT: 140 ml / NET: 40 ml      PHYSICAL EXAM:    General: Well developed; well nourished; in no acute distress  HEENT: MMM, conjunctiva and sclera clear  Gastrointestinal: Soft non-tender non-distended; Normal bowel sounds; No hepatosplenomegaly  Skin: Warm and dry. No obvious rash    LABS:  CBC Full  -  ( 27 Dec 2016 06:56 )  WBC Count : 11.9 K/uL  Hemoglobin : 8.5 g/dL  Hematocrit : 28.3 %  Platelet Count - Automated : 353 K/uL  Mean Cell Volume : 84.5 fL  Mean Cell Hemoglobin : 25.4 pg  Mean Cell Hemoglobin Concentration : 30.0 g/dL  Auto Neutrophil # : x  Auto Lymphocyte # : x  Auto Monocyte # : x  Auto Eosinophil # : x  Auto Basophil # : x  Auto Neutrophil % : x  Auto Lymphocyte % : x  Auto Monocyte % : x  Auto Eosinophil % : x  Auto Basophil % : x    27 Dec 2016 06:56    137    |  98     |  15     ----------------------------<  101    4.0     |  30     |  0.39     Ca    9.1        27 Dec 2016 06:56  Phos  3.2       27 Dec 2016 06:56  Mg     2.3       27 Dec 2016 06:56      PTT - ( 27 Dec 2016 06:56 )  PTT:60.3 sec

## 2016-12-28 NOTE — PROGRESS NOTE ADULT - RS GEN PE MLT RESP DETAILS PC
no rhonchi/normal/no wheezes/diminished breath sounds, L/diminished breath sounds, R/no rales/airway patent/clear to auscultation bilaterally
normal/airway patent/diminished breath sounds, R/clear to auscultation bilaterally/diminished breath sounds, L
rales/normal/airway patent/respirations non-labored
no rales/clear to auscultation bilaterally/diminished breath sounds, L/airway patent/normal/diminished breath sounds, R/no wheezes/no rhonchi
normal/no rales/clear to auscultation bilaterally/no rhonchi/airway patent/no wheezes
rhonchi
diminished breath sounds, L/rhonchi

## 2016-12-28 NOTE — PROGRESS NOTE ADULT - ATTENDING COMMENTS
Afebrile and comfortable on CPAP 7 and PS 10. CXR with effusion left >right and may be contributing to volume loss on left. For pigtail drainage today. Follow up with surgery plans based on CT finding and cont abx per ID with gent peak and trough. Resume  j tube feeds per surgery and continue diuresis and anticoag for subclavian and axillary thrombosis. Afebrile and comfortable on CPAP 7 and PS 10. CXR with effusion left >right and may be contributing to volume loss on left. For pigtail drainage today. Follow up with surgery plans based on CT finding and cont abx per ID with gent peak and trough. Resume  j tube feeds per surgery and continue diuresis and anticoag for subclavian and axillary thrombosis. Checked VBG and met alkalosis noted likely secondary to diuresis and GI tract losses.

## 2016-12-28 NOTE — PROCEDURE NOTE - NSPROCDETAILS_GEN_ALL_CORE
Seldinger technique/secured in place/percutaneous/thoracostomy tube placed percutaneously/sterile dressing applied/ultrasound assessment of fluid (location)/dressing applied
sterile technique, catheter placed/ultrasound guidance/location identified, draped/prepped, sterile technique used
lumen(s) aspirated and flushed/guidewire recovered/ultrasound guidance/sterile dressing applied/sterile technique, catheter placed
lumen(s) aspirated and flushed/sterile dressing applied/sterile technique, catheter placed/ultrasound guidance/guidewire recovered

## 2016-12-28 NOTE — PROCEDURE NOTE - NSPOSTCAREGUIDE_GEN_A_CORE
Care for catheter as per unit/ICU protocols/Verbal/written post procedure instructions were given to patient/caregiver
Care for catheter as per unit/ICU protocols

## 2016-12-28 NOTE — PROGRESS NOTE ADULT - ASSESSMENT
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 abumin 25%q8h    Pulm: Trach CPAP 8/5, Peridex. Duonebs. Mucomyst.  FEN/GI: NPO. restart J-tube TF to goal. monitor output from vac and CHECO. Protonix. CT C/A/P, will discuss further plans with surgeon  : Aimee. Post-op ATN - Lasix to keep I&O negative   Endo: ISS.   ID: MDR Pseudomonas (sputum) -  gent (12/26-) - gent peak level low yesterday, dose adjust to 150mg q8h, repeat gent trough and peak.  Imipenem (12/14--),Bronch wash 12/20 \\ DC Vanc (12/13-14), Zosyn (12/3-14), Linezolid (12/24-25),  fluconazole (12/4-25), - H. simplex Acyclovir (12/20-25)   Heme: SCDs, Heparin gtt goal 60-80  Lines/drains: Left IJ TLC (12/3--), left IJ Juanjo (12/6-12/14). CHECO x 2. Feeding J-tube.  Wounds: Midline abdominal wound - wound vac (change T/TH/Sat).

## 2016-12-28 NOTE — PROCEDURE NOTE - NSTOLERANCE_GEN_A_CORE
Patient tolerated procedure well.

## 2016-12-28 NOTE — PROCEDURE NOTE - NSINFORMCONSENT_GEN_A_CORE
Benefits, risks, and possible complications of procedure explained to patient/caregiver who verbalized understanding and gave written consent.
Benefits, risks, and possible complications of procedure explained to patient/caregiver who verbalized understanding and gave written consent.
by spouse over the phone/Benefits, risks, and possible complications of procedure explained to patient/caregiver who verbalized understanding and gave verbal consent.
Benefits, risks, and possible complications of procedure explained to patient/caregiver who verbalized understanding and gave written consent.
This was an emergent procedure.

## 2016-12-28 NOTE — PROCEDURE NOTE - NSSITEPREP_SKIN_A_CORE
Adherence to aseptic technique: hand hygiene prior to donning barriers (gown, gloves), don cap and mask, sterile drape over patient/chlorhexidine
chlorhexidine

## 2016-12-28 NOTE — PROGRESS NOTE ADULT - SUBJECTIVE AND OBJECTIVE BOX
Interval Events:  Patient seen and examined at bedside.    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 (30 Nov 2016 17:30)      PAST MEDICAL & SURGICAL HISTORY:  Reflux  Obesity  DVT (deep venous thrombosis): 2013  Depression  Diverticulitis  Hypertension  Gastric bypass status for obesity: gastric sleeve, 6/2012  S/P breast biopsy: 2011, left  S/P colon resection: 2011  S/P knee surgery: repair 2009, 2011  Umbilical hernia: 2000  S/P appendectomy: 1975  S/P tonsillectomy: 1967      MEDICATIONS:  Pulmonary:  ALBUTerol/ipratropium for Nebulization 3milliLiter(s) Nebulizer every 6 hours  acetylcysteine 20% Inhalation 3milliLiter(s) Inhalation every 8 hours    Antimicrobials:  fluconAZOLE IVPB 200milliGRAM(s) IV Intermittent every 24 hours  imipenem/cilastatin  IVPB  IV Intermittent   imipenem/cilastatin  IVPB 500milliGRAM(s) IV Intermittent every 6 hours  acyclovir IVPB 550milliGRAM(s) IV Intermittent every 8 hours  linezolid  IVPB  IV Intermittent     Anticoagulants:  heparin  flush 100 Units/mL Injectable 100Unit(s) IV Push every other day  heparin  Infusion 1800Unit(s)/Hr IV Continuous <Continuous>    Cardiac:      Allergies    sulfa drugs (Unknown)  sulfamethoxazole (Other)    Intolerances        Vital Signs Last 24 Hrs  T(C): 37.9, Max: 38.3 (12-24 @ 00:46)  T(F): 100.2, Max: 101 (12-24 @ 00:46)  HR: 89 (81 - 98)  BP: 119/66 (100/59 - 155/85)  BP(mean): 91 (72 - 828)  RR: 14 (14 - 20)  SpO2: 95% (92% - 99%)  I & Os for 24h ending 12-24 @ 07:00  =============================================  IN: 3622 ml / OUT: 1745 ml / NET: 1877 ml    I & Os for current day (as of 12-24 @ 21:05)  =============================================  IN: 1840 ml / OUT: 335 ml / NET: 1505 ml    Mode: AC/ CMV (Assist Control/ Continuous Mandatory Ventilation)  RR (machine): 14  TV (machine): 400  FiO2: 50  PEEP: 6  ITime: 1  MAP: 10  PIP: 21      LABS:      CBC Full  -  ( 24 Dec 2016 06:17 )  WBC Count : 10.2 K/uL  Hemoglobin : 9.0 g/dL  Hematocrit : 30.1 %  Platelet Count - Automated : 320 K/uL  Mean Cell Volume : 85.3 fL  Mean Cell Hemoglobin : 25.5 pg  Mean Cell Hemoglobin Concentration : 29.9 g/dL  Auto Neutrophil # : x  Auto Lymphocyte # : x  Auto Monocyte # : x  Auto Eosinophil # : x  Auto Basophil # : x  Auto Neutrophil % : x  Auto Lymphocyte % : x  Auto Monocyte % : x  Auto Eosinophil % : x  Auto Basophil % : x    24 Dec 2016 06:15    139    |  100    |  17     ----------------------------<  109    3.8     |  30     |  0.48     Ca    8.5        24 Dec 2016 06:15  Phos  2.7       24 Dec 2016 06:15  Mg     2.1       24 Dec 2016 06:15      PT/INR - ( 24 Dec 2016 06:17 )   PT: 15.4 sec;   INR: 1.38          PTT - ( 24 Dec 2016 15:43 )  PTT:63.9 sec                    RADIOLOGY & ADDITIONAL STUDIES (The following images were personally reviewed):  Yu:                            Yes        No  Urine output:               Yes        No  DVT prophylaxis:         Yes        No  Flattus:                          Yes        No  Bowel movement:       Yes        No Interval Events:  Patient seen and examined at bedside.    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 She agreed to the CT insertion  PAST MEDICAL & SURGICAL HISTORY:  Reflux  Obesity  DVT (deep venous thrombosis): 2013  Depression  Diverticulitis  Hypertension  Gastric bypass status for obesity: gastric sleeve, 6/2012  S/P breast biopsy: 2011, left  S/P colon resection: 2011  S/P knee surgery: repair 2009, 2011  Umbilical hernia: 2000  S/P appendectomy: 1975  S/P tonsillectomy: 1967      MEDICATIONS:  Pulmonary:  ALBUTerol/ipratropium for Nebulization 3milliLiter(s) Nebulizer every 6 hours  acetylcysteine 20% Inhalation 3milliLiter(s) Inhalation every 8 hours    Antimicrobials:  fluconAZOLE IVPB 200milliGRAM(s) IV Intermittent every 24 hours  imipenem/cilastatin  IVPB  IV Intermittent   imipenem/cilastatin  IVPB 500milliGRAM(s) IV Intermittent every 6 hours  acyclovir IVPB 550milliGRAM(s) IV Intermittent every 8 hours  linezolid  IVPB  IV Intermittent     Anticoagulants:  heparin  flush 100 Units/mL Injectable 100Unit(s) IV Push every other day  heparin  Infusion 1800Unit(s)/Hr IV Continuous <Continuous>    Cardiac:      Allergies    sulfa drugs (Unknown)  sulfamethoxazole (Other)    Intolerances        Vital Signs Last 24 Hrs  T(C): 37.9, Max: 38.3 (12-24 @ 00:46)  T(F): 100.2, Max: 101 (12-24 @ 00:46)  HR: 89 (81 - 98)  BP: 119/66 (100/59 - 155/85)  BP(mean): 91 (72 - 828)  RR: 14 (14 - 20)  SpO2: 95% (92% - 99%)  I & Os for 24h ending 12-24 @ 07:00  =============================================  IN: 3622 ml / OUT: 1745 ml / NET: 1877 ml    I & Os for current day (as of 12-24 @ 21:05)  =============================================  IN: 1840 ml / OUT: 335 ml / NET: 1505 ml    Mode: AC/ CMV (Assist Control/ Continuous Mandatory Ventilation)  RR (machine): 14  TV (machine): 400  FiO2: 50  PEEP: 6  ITime: 1  MAP: 10  PIP: 21      LABS:      CBC Full  -  ( 24 Dec 2016 06:17 )  WBC Count : 10.2 K/uL  Hemoglobin : 9.0 g/dL  Hematocrit : 30.1 %  Platelet Count - Automated : 320 K/uL  Mean Cell Volume : 85.3 fL  Mean Cell Hemoglobin : 25.5 pg  Mean Cell Hemoglobin Concentration : 29.9 g/dL  Auto Neutrophil # : x  Auto Lymphocyte # : x  Auto Monocyte # : x  Auto Eosinophil # : x  Auto Basophil # : x  Auto Neutrophil % : x  Auto Lymphocyte % : x  Auto Monocyte % : x  Auto Eosinophil % : x  Auto Basophil % : x    24 Dec 2016 06:15    139    |  100    |  17     ----------------------------<  109    3.8     |  30     |  0.48     Ca    8.5        24 Dec 2016 06:15  Phos  2.7       24 Dec 2016 06:15  Mg     2.1       24 Dec 2016 06:15      PT/INR - ( 24 Dec 2016 06:17 )   PT: 15.4 sec;   INR: 1.38          PTT - ( 24 Dec 2016 15:43 )  PTT:63.9 sec                    RADIOLOGY & ADDITIONAL STUDIES (The following images were personally reviewed):  Yu:                            Yes          Urine output:               Yes          DVT prophylaxis:         Yes          Flattus:                          Yes          Bowel movement:       Yes

## 2016-12-28 NOTE — PROCEDURE NOTE - NSPOSTPRCRAD_GEN_A_CORE
chest tube in correct position
no pneumothorax/post-procedure radiography performed/central line located in the superior vena cava
chest radiograph/line ends at subclavian
post-procedure radiography performed/central line located in the/no pneumothorax

## 2016-12-28 NOTE — PROGRESS NOTE ADULT - SUBJECTIVE AND OBJECTIVE BOX
INTERVAL HPI/OVERNIGHT EVENTS: Wound vac was changed and she went down for CT c/a/p with PO and green dye contrast with subsequent green dye in wound vac and seeping around J tube. When asked how she is doing, she states she is in 7/10 pain and her "colon hurts." Also c/o rectal pain. She is still having low grade temperature with Tmax 100.     ROS:  Negative except mentioned above.     Allergies    sulfa drugs (Unknown)  sulfamethoxazole (Other)    Intolerances        ANTIBIOTICS/RELEVANT:  antimicrobials  imipenem/cilastatin  IVPB  IV Intermittent   imipenem/cilastatin  IVPB 500milliGRAM(s) IV Intermittent every 6 hours    immunologic:    OTHER:  pantoprazole  Injectable 40milliGRAM(s) IV Push daily  insulin lispro (HumaLOG) corrective regimen sliding scale  SubCutaneous Before meals and at bedtime  chlorhexidine 0.12% Liquid 15milliLiter(s) Swish and Spit two times a day  heparin  flush 100 Units/mL Injectable 100Unit(s) IV Push every other day  ALBUTerol/ipratropium for Nebulization 3milliLiter(s) Nebulizer every 6 hours  acetaminophen    Suspension. 650milliGRAM(s) Oral every 6 hours PRN  acetylcysteine 20% Inhalation 3milliLiter(s) Inhalation every 8 hours  albumin human 25% IVPB 50milliLiter(s) IV Intermittent every 8 hours  acetaminophen    Suspension 650milliGRAM(s) Enteral Tube every 6 hours PRN  ondansetron Injectable 4milliGRAM(s) IV Push every 6 hours PRN  HYDROmorphone  Injectable 1milliGRAM(s) IV Push every 4 hours PRN  HYDROmorphone  Injectable 0.5milliGRAM(s) IV Push every 4 hours PRN  heparin  Infusion 1850Unit(s)/Hr IV Continuous <Continuous>      Objective:  Vital Signs Last 24 Hrs  T(C): 37.6, Max: 37.6 (12-28 @ 17:48)  T(F): 99.7, Max: 99.7 (12-28 @ 17:48)  HR: 96 (52 - 110)  BP: 130/81 (103/71 - 192/90)  BP(mean): 96 (84 - 123)  RR: 14 (13 - 31)  SpO2: 96% (94% - 100%)    PHYSICAL EXAM:  General - obese, awake, alert responding to all commands, NAD   HEENT - PERRL, MMM, no oral lesions noted. +Trach   CV - S1, S2 RRR, no murmurs.   Resp - diffuse rhonchi, no crackles/wheezing   Abdomen - soft, obese, dressing with green dye around J tube as well as green dye noted at wound vac site from contrast in CT overnight. Mild tenderness to palpation around wound vac area.   Extremities - Obese, pedal edema       LABS:                        8.3    10.0  )-----------( 378      ( 28 Dec 2016 05:24 )             26.9     28 Dec 2016 05:24    137    |  96     |  14     ----------------------------<  97     3.6     |  32     |  0.42     Ca    9.1        28 Dec 2016 05:24  Phos  3.5       28 Dec 2016 05:24  Mg     2.0       28 Dec 2016 05:24      PT/INR - ( 28 Dec 2016 06:53 )   PT: 15.2 sec;   INR: 1.37          PTT - ( 28 Dec 2016 06:53 )  PTT:57.6 sec        MICROBIOLOGY:    Bl Cx 12/16, 12/24: NGTD     Abd fluid 12/11: strep milleri, moderate yeast     Bronchial wash 12/13, 12/24: MDR pseudomonas  Bronchial wash 12/14: yeast         RADIOLOGY & ADDITIONAL STUDIES:  CT c/a/p w/ IV and green dye contrast 12/27: s/p gastrectomy and gastrojejunal anastomosis with a stent in place. 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  A few rim-enhancing fluid collections in the lower abdomen and pelvis which might be infected.Moderate to large left pleural effusion, enlarged since prior study. Small right pleural effusion. Extensive atelectatic changes in the lungs,   left greater than right.    CT c/a/p 12/21: 1.  Interval removal of endoluminal stent from the distal esophagus   extending to the jejunal loop. Interval placement of an esophageal stent. 2.  Slight interval decrease in small to moderate ascites, particularly   in the left upper quadrant surrounding the anastomosis. The largest pocket measures 1.3 x 6.6 cm in the midline upper pelvis, similar to the   prior study.3.  Further interval decrease in free air, with a a few punctate foci   remaining.4.  Slight interval decrease in small right pleural effusion. No change   in small left pleural effusion. Associated atelectasis bilaterally,   unchanged.5.  New anterior abdominal wall defect at the surgical incision site, presumably from interval debridement. 6.  Mild cardiomegaly and trace pericardial effusion, unchanged.  7.  Diffuse anasarca.    CXR 12/25: Improvement of opacification left hemithorax in comparison to   prior examination of the chest 12/25/2016. Bilateral effusions.   Underlying infiltrates cannot be excluded

## 2016-12-28 NOTE — PROGRESS NOTE ADULT - SUBJECTIVE AND OBJECTIVE BOX
S: No new issues/events overnight, no new med c/o, tolerated CPAP yesterday.     O: ICU Vital Signs Last 24 Hrs  T(F): 98.4, Max: 100 (12-27 @ 17:42)  HR: 79 (71 - 96)  BP: 137/73 (103/71 - 156/85)  BP(mean): 92 (80 - 107)  ABP: --  RR: 14 (14 - 30)  SpO2: 95% (92% - 98%)  Wt(kg): --    PHYSICAL EXAM:     Neurological: AAOx3, CNII-XII intact,  strength 5/5 b/l  Cardiovascular: RRR  Respiratory: CTA, decreased breathsounds at bases  Gastrointestinal: soft, NT, ND, BS+, CHECO with green dye, Vac with green dye, J-tube has green dye AROUND opening of tube.   Extremities: warm, trace edema  Vascular: no cyanosis/erythema    LABS:    28 Dec 2016 05:24    137    |  96     |  14     ----------------------------<  97     3.6     |  32     |  0.42     Ca    9.1        28 Dec 2016 05:24  Phos  3.5       28 Dec 2016 05:24  Mg     2.0       28 Dec 2016 05:24                          8.3    10.0  )-----------( 378      ( 28 Dec 2016 05:24 )             26.9   PT/INR - ( 28 Dec 2016 06:53 )   PT: 15.2 sec;   INR: 1.37          PTT - ( 28 Dec 2016 06:53 )  PTT:57.6 sec  CAPILLARY BLOOD GLUCOSE  102 (28 Dec 2016 07:00)  90 (27 Dec 2016 22:00)  100 (27 Dec 2016 16:00)  104 (27 Dec 2016 11:00)    MEDICATIONS  (STANDING):  pantoprazole  Injectable 40milliGRAM(s) IV Push daily  insulin lispro (HumaLOG) corrective regimen sliding scale  SubCutaneous Before meals and at bedtime  chlorhexidine 0.12% Liquid 15milliLiter(s) Swish and Spit two times a day  heparin  flush 100 Units/mL Injectable 100Unit(s) IV Push every other day  ALBUTerol/ipratropium for Nebulization 3milliLiter(s) Nebulizer every 6 hours  acetylcysteine 20% Inhalation 3milliLiter(s) Inhalation every 8 hours  imipenem/cilastatin  IVPB  IV Intermittent   imipenem/cilastatin  IVPB 500milliGRAM(s) IV Intermittent every 6 hours  sodium chloride 0.9% lock flush 20milliLiter(s) IV Push once  albumin human 25% IVPB 50milliLiter(s) IV Intermittent every 8 hours  heparin  Infusion 1850Unit(s)/Hr IV Continuous <Continuous>  gentamicin   IVPB 150milliGRAM(s) IV Intermittent every 8 hours  potassium chloride   Powder 40milliEquivalent(s) Enteral Tube once    MEDICATIONS  (PRN):  acetaminophen    Suspension. 650milliGRAM(s) Oral every 6 hours PRN Mild Pain (1 - 3)  sodium chloride 0.9% lock flush 10milliLiter(s) IV Push every 1 hour PRN After each medication administration  sodium chloride 0.9% lock flush 10milliLiter(s) IV Push every 12 hours PRN Lumen of catheter NOT used  acetaminophen    Suspension 650milliGRAM(s) Enteral Tube every 6 hours PRN For Temp greater than 38 C (100.4 F)  ondansetron Injectable 4milliGRAM(s) IV Push every 6 hours PRN Nausea and/or Vomiting  HYDROmorphone  Injectable 1milliGRAM(s) IV Push every 4 hours PRN Severe Pain (7 - 10)  HYDROmorphone  Injectable 0.5milliGRAM(s) IV Push every 4 hours PRN Moderate Pain (4 - 6)      Yu:	  [ ] None	[x] Daily Yu Order Placed	   Indication:	  [x] Strict I and O's    [ ] Obstruction     [ ] Incontinence + Stage 3 or 4 Decubitus  Central Line:  [ ] None	   [x]  Medication / TPN Administration     [ ] No Peripheral IV

## 2016-12-28 NOTE — PROGRESS NOTE ADULT - ASSESSMENT
56 year old critical patient s/p SIPs procedure with free air concerning for anastomotic leak s/p emergent ex lap with abdominal washout found to have leak of esophagojejunostomy which they could not repair due to its location.  She is s/p esophageal stent placement, peritoneal washout and internal double pigtail stent placement. During endoscopy second defect noted on enteric side of anastomosis. Repeat endoscopy removed stent and placed a second stent over entire 5 cm anastomotic defect. 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 She has had repeated bronchoscopies with 12/13 and 12/24 bronchial wash growing MDR pseudomonas. In light of her bacteriology and resistance patterns after calling and speaking with microbiology lab we recommend:   -D/c gentamycin; get trough from 24 hours from last dose given and we will review level prior to starting IV tobramycin (do not start IV tobramycin today)  -Start nebulized Tobramycin now for anti-pseudomonal   -Switch imipenem to meropenem 2g q8 now with extended infusion (over 2 to 4 hours for each dose) for anti-strep and anaerobic coverage; resistant on MDR bronchial wash   -Start micafungin 100mg QD now for yeast   -Start Colistin 100mg BID now for MDR anti-pseudomonal  -Drain effusion and abdominal processes for attempted source control, this is a very virulent and resistant organism with an extensive depth of infection  -Check for C.diff in light of multiple bowel movements with rectal tube in place; she has been on extensive and prolonged antibiotics  -Assess rectal tube risks/benefits, she is c/o rectal pain and is important to avoid rectal ulceration as additional source of infection   -Will continue to follow 56 year old critical patient s/p SIPs procedure with free air concerning for anastomotic leak s/p emergent ex lap with abdominal washout found to have leak of esophagojejunostomy which they could not repair due to its location.  She is s/p esophageal stent placement, peritoneal washout and internal double pigtail stent placement. During endoscopy second defect noted on enteric side of anastomosis. Repeat endoscopy removed stent and placed a second stent over entire 5 cm anastomotic defect. 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 She has had repeated bronchoscopies with 12/13 and 12/24 bronchial wash growing MDR pseudomonas. In light of her bacteriology and resistance patterns after calling and speaking with microbiology lab we recommend:   -D/c gentamycin; get trough from 24 hours from last dose given and we will review level prior to starting IV tobramycin (do not start IV tobramycin today)  -Start nebulized Tobramycin now for anti-pseudomonal   -Switch imipenem to meropenem 2g q8 now with extended infusion (over 2 to 4 hours for each dose) for synergy with colistin against Pseudomonas, anti-strep and anaerobic coverage)  -Start micafungin 100mg QD now for yeast   -Start Colistin 100mg BID now for MDR anti-pseudomonal  -Drain effusion and abdominal processes for attempted source control, this is a very virulent and resistant organism with an extensive depth of infection  -Check for C.diff in light of multiple bowel movements with rectal tube in place; she has been on extensive and prolonged antibiotics  -Assess rectal tube risks/benefits, she is c/o rectal pain and is important to avoid rectal ulceration as additional source of infection   -Will continue to follow

## 2016-12-29 NOTE — PROGRESS NOTE ADULT - ATTENDING COMMENTS
Pt remains stable with low grade temp to 100.5 today and wbc 13. Will send CHECO drainage for culture and f/u with surg possibility of draining small pelvic collections in order to obtain cultures to guide antibiotic therapy. Will broaden abx as per ID if pt clinically declines. Cont CPAP/PS. check UUN and prealb. Cont MVI and f/u with wound care noted. May need stone reinserted if incontinent with pressure ulcer stage 3.

## 2016-12-29 NOTE — PROGRESS NOTE ADULT - SUBJECTIVE AND OBJECTIVE BOX
S: s/p pigtail chest tube last night, fluid sent for cx, cell count, glu, protein,     O: ICU Vital Signs Last 24 Hrs  T(F): 100, Max: 100.5 (12-28 @ 22:05)  HR: 92 (52 - 110)  BP: 147/83 (104/65 - 192/90)  BP(mean): 109 (81 - 123)  ABP: --  RR: 14 (13 - 31)  SpO2: 97% (93% - 100%)  Wt(kg): --    PHYSICAL EXAM:     Neurological: AAOx3, CNII-XII intact,  strength 5/5 b/l  Cardiovascular: RRR  Respiratory: CTA  Gastrointestinal: soft, NT, ND, BS+, midline vac, right CHECO thick drainage, J-tube with yellowish drainge around tube, no signs of purple dye in VAC nor CHECO, no purple dye found around J-tube.   Extremities: warm, trace dependent edema  Vascular: no cyanosis/erythema    LABS:    29 Dec 2016 04:45    140    |  100    |  16     ----------------------------<  107    4.3     |  32     |  0.37     Ca    9.6        29 Dec 2016 04:45  Phos  3.2       29 Dec 2016 04:45  Mg     2.2       29 Dec 2016 04:45                          8.7    13.0  )-----------( 352      ( 29 Dec 2016 04:45 )             28.4   PT/INR - ( 29 Dec 2016 04:45 )   PT: 15.7 sec;   INR: 1.41          PTT - ( 29 Dec 2016 04:45 )  PTT:70.6 sec  CAPILLARY BLOOD GLUCOSE  107 (29 Dec 2016 06:00)  104 (28 Dec 2016 21:42)  88 (28 Dec 2016 17:00)    MEDICATIONS  (STANDING):  pantoprazole  Injectable 40milliGRAM(s) IV Push daily  insulin lispro (HumaLOG) corrective regimen sliding scale  SubCutaneous Before meals and at bedtime  chlorhexidine 0.12% Liquid 15milliLiter(s) Swish and Spit two times a day  heparin  flush 100 Units/mL Injectable 100Unit(s) IV Push every other day  ALBUTerol/ipratropium for Nebulization 3milliLiter(s) Nebulizer every 6 hours  acetylcysteine 20% Inhalation 3milliLiter(s) Inhalation every 8 hours  imipenem/cilastatin  IVPB  IV Intermittent   imipenem/cilastatin  IVPB 500milliGRAM(s) IV Intermittent every 6 hours  sodium chloride 0.9% lock flush 20milliLiter(s) IV Push once  albumin human 25% IVPB 50milliLiter(s) IV Intermittent every 8 hours  heparin  Infusion 1850Unit(s)/Hr IV Continuous <Continuous>  gentamicin   IVPB 150milliGRAM(s) IV Intermittent every 24 hours    MEDICATIONS  (PRN):  acetaminophen    Suspension. 650milliGRAM(s) Oral every 6 hours PRN Mild Pain (1 - 3)  sodium chloride 0.9% lock flush 10milliLiter(s) IV Push every 1 hour PRN After each medication administration  sodium chloride 0.9% lock flush 10milliLiter(s) IV Push every 12 hours PRN Lumen of catheter NOT used  acetaminophen    Suspension 650milliGRAM(s) Enteral Tube every 6 hours PRN For Temp greater than 38 C (100.4 F)  ondansetron Injectable 4milliGRAM(s) IV Push every 6 hours PRN Nausea and/or Vomiting  HYDROmorphone  Injectable 1milliGRAM(s) IV Push every 4 hours PRN Severe Pain (7 - 10)  HYDROmorphone  Injectable 0.5milliGRAM(s) IV Push every 4 hours PRN Moderate Pain (4 - 6)      Yu:	  [ ] None	[x ] Daily Yu Order Placed	   Indication:	  [ x] Strict I and O's    [ ] Obstruction     [ ] Incontinence + Stage 3 or 4 Decubitus  Central Line:  [ ] None	   [x ]  Medication / TPN Administration     [ ] No Peripheral IV

## 2016-12-29 NOTE — PROGRESS NOTE ADULT - ATTENDING COMMENTS
The patient is doing better pulmonary -wise after drainage of the pleural space and she's on pressure support trials and possible trach color.

## 2016-12-29 NOTE — PROGRESS NOTE ADULT - SUBJECTIVE AND OBJECTIVE BOX
Interval Events:  Patient seen and examined at bedside.    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 (30 Nov 2016 17:30)  She's doing better she/order breath just sore with the chest tube site    she is not in distress. She agreed to the CT insertion  PAST MEDICAL & SURGICAL HISTORY:  Reflux  Obesity  DVT (deep venous thrombosis): 2013  Depression  Diverticulitis  Hypertension  Gastric bypass status for obesity: gastric sleeve, 6/2012  S/P breast biopsy: 2011, left  S/P colon resection: 2011  S/P knee surgery: repair 2009, 2011  Umbilical hernia: 2000  S/P appendectomy: 1975  S/P tonsillectomy: 1967      MEDICATIONS:  Pulmonary:  ALBUTerol/ipratropium for Nebulization 3milliLiter(s) Nebulizer every 6 hours  acetylcysteine 20% Inhalation 3milliLiter(s) Inhalation every 8 hours    Antimicrobials:  fluconAZOLE IVPB 200milliGRAM(s) IV Intermittent every 24 hours  imipenem/cilastatin  IVPB  IV Intermittent   imipenem/cilastatin  IVPB 500milliGRAM(s) IV Intermittent every 6 hours  acyclovir IVPB 550milliGRAM(s) IV Intermittent every 8 hours  linezolid  IVPB  IV Intermittent     Anticoagulants:  heparin  flush 100 Units/mL Injectable 100Unit(s) IV Push every other day  heparin  Infusion 1800Unit(s)/Hr IV Continuous <Continuous>    Cardiac:      Allergies    sulfa drugs (Unknown)  sulfamethoxazole (Other)    Intolerances        Vital Signs Last 24 Hrs  T(C): 37.9, Max: 38.3 (12-24 @ 00:46)  T(F): 100.2, Max: 101 (12-24 @ 00:46)  HR: 89 (81 - 98)  BP: 119/66 (100/59 - 155/85)  BP(mean): 91 (72 - 828)  RR: 14 (14 - 20)  SpO2: 95% (92% - 99%)  I & Os for 24h ending 12-24 @ 07:00  =============================================  IN: 3622 ml / OUT: 1745 ml / NET: 1877 ml    I & Os for current day (as of 12-24 @ 21:05)  =============================================  IN: 1840 ml / OUT: 335 ml / NET: 1505 ml    Mode: AC/ CMV (Assist Control/ Continuous Mandatory Ventilation)  RR (machine): 14  TV (machine): 400  FiO2: 50  PEEP: 6  ITime: 1  MAP: 10  PIP: 21      LABS:      CBC Full  -  ( 24 Dec 2016 06:17 )  WBC Count : 10.2 K/uL  Hemoglobin : 9.0 g/dL  Hematocrit : 30.1 %  Platelet Count - Automated : 320 K/uL  Mean Cell Volume : 85.3 fL  Mean Cell Hemoglobin : 25.5 pg  Mean Cell Hemoglobin Concentration : 29.9 g/dL  Auto Neutrophil # : x  Auto Lymphocyte # : x  Auto Monocyte # : x  Auto Eosinophil # : x  Auto Basophil # : x  Auto Neutrophil % : x  Auto Lymphocyte % : x  Auto Monocyte % : x  Auto Eosinophil % : x  Auto Basophil % : x    24 Dec 2016 06:15    139    |  100    |  17     ----------------------------<  109    3.8     |  30     |  0.48     Ca    8.5        24 Dec 2016 06:15  Phos  2.7       24 Dec 2016 06:15  Mg     2.1       24 Dec 2016 06:15      PT/INR - ( 24 Dec 2016 06:17 )   PT: 15.4 sec;   INR: 1.38          PTT - ( 24 Dec 2016 15:43 )  PTT:63.9 sec          EXAM:  XR CHEST 1 VIEW PORT ROUTINE                           PROCEDURE DATE:  12/29/2016                 INTERPRETATION:  Chest x-ray    Indication: ICU patient    A portable frontal view of the chest is compared to the prior study dated   12/28/2016. Right PICC line, tracheostomy tube and left chest tubes are   unchanged. Retrocardiac opacity is again noted probably due to   atelectasis and possibly small residual left pleural effusion. No new   consolidation. No pneumothorax. Gastric tube and left upper quadrant   abdominal drain are again noted.      IMPRESSION: No significant change.            RADIOLOGY & ADDITIONAL STUDIES (The following images were personally reviewed):  Yu:                            Yes          Urine output:               Yes          DVT prophylaxis:         Yes          Flattus:                          Yes          Bowel movement:       Yes

## 2016-12-29 NOTE — PROGRESS NOTE ADULT - ASSESSMENT
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 abumin 25%q8h    Pulm: Trach CPAP 10/7, Peridex. Duonebs. Mucomyst. s/p left  chest tube last night, pleural fluid sent for cx, f/u result. f/u most recent sputum cx from 2 days ago.   FEN/GI: NPO. J-tube TF to goal. s/p purple dye through J-tube yesterday, no apparent leakage from J-tube so far, Protonix.   : Yu. Post-op ATN - Lasix PRN. (s/p lasix 40 qd for past few days)   Endo: ISS.   ID: MDR Pseudomonas (sputum) -  gent (12/26-) - gent trough 0.2 this morning, re-dosed gent to 150mg this morning, check gent peak level after dose. - likely need gent 150mg q24h.  Imipenem (12/14--). f/u repeat sputum cx sent 2 days ago \\ DC Vanc (12/13-14), Zosyn (12/3-14), Linezolid (12/24-25),  fluconazole (12/4-25), - H. simplex Acyclovir (12/20-25)   Heme: SCDs, Heparin gtt goal 60-80  Lines/drains: L PICC, CHECO x 2. Feeding J-tube.  Wounds: Midline abdominal wound - wound vac (change T/TH/Sat). 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 abumin 25%q8h    Pulm: Trach CPAP 10/7, Peridex. Duonebs. Mucomyst. s/p left  chest tube last night, pleural fluid sent for cx, f/u result. f/u most recent sputum cx from 2 days ago.   FEN/GI: NPO. J-tube TF to goal. s/p purple dye through J-tube yesterday, no apparent leakage from J-tube so far, Protonix.   : dc bertha. TOV (s/p lasix 40 qd for past few days)   Endo: ISS.   ID: MDR Pseudomonas (sputum) -  gent (12/26-) - gent trough 0.2 this morning, re-dosed gent to 150mg this morning, check gent peak level after dose. - likely need gent 150mg q24h.  Imipenem (12/14--). f/u repeat sputum cx sent 2 days ago \\ DC Vanc (12/13-14), Zosyn (12/3-14), Linezolid (12/24-25),  fluconazole (12/4-25), - H. simplex Acyclovir (12/20-25)   Heme: SCDs, Heparin gtt goal 60-80  Lines/drains: L PICC, CHECO x 2. Feeding J-tube.  Wounds: Midline abdominal wound - wound vac (change T/TH/Sat). wound care consulted for stg II ulcer and skin around J-tube 56YOF s/p bypass revision following failed SIPS (anastamotic leak), found to have draining intra-abbdominal abscess, multiple bronchs for mucous plugging    Neuro: Dilaudid PRN, Tylenol PRN, Lexapro 20mg daily  CVS: MAP > 70, normotensive goals. abumin 25%q8h    Pulm: Trach CPAP 10/7, Peridex. Duonebs. Mucomyst. s/p left  chest tube last night, pleural fluid sent for cx, f/u result. f/u most recent sputum cx from 2 days ago.   FEN/GI: NPO. J-tube TF to goal. s/p purple dye through J-tube yesterday, no apparent leakage from J-tube so far, Protonix.   : dc bertha. TOV (s/p lasix 40 qd for past few days)   Endo: ISS.   ID: MDR Pseudomonas (sputum) -  gent (12/26-) - gent trough 0.2 this morning, re-dosed gent to 180mg this morning, check gent peak level after dose. - likely need gent 150mg q24h.  Imipenem (12/14--). f/u repeat sputum cx sent 2 days ago \\ DC Vanc (12/13-14), Zosyn (12/3-14), Linezolid (12/24-25),  fluconazole (12/4-25), - H. simplex Acyclovir (12/20-25)   Heme: SCDs, Heparin gtt goal 60-80  Lines/drains: L PICC, CHECO x 2. Feeding J-tube.  Wounds: Midline abdominal wound - wound vac (change T/TH/Sat). wound care consulted for stg II ulcer and skin around J-tube

## 2016-12-30 NOTE — PROGRESS NOTE ADULT - ATTENDING COMMENTS
ID Attending addendum    ID issues include:   XDR Pesudomonas pneumonia and ongoing large secretions; pleural effusion now drained.  Anastomotc leak(s) and resultant intraabdominal microbial seeding and intraabdominal collections- suspect still infected;   Risk for Cdiff    Recommend:  Drainage/sampling of the intraabdominal collections and sending cultures.  Modification of antimicrobial therapy to: Nebulized tobramycin, IV Colistin plus synergistic carbapenem (Meropenem better as could be infused during longer time) - all directed against Pseudomonas; Meropenem with added activity against other intraabdominal pathogens including Strep milleri found in the abdominal collections before.  Would d/c IV gentamicin for now and monitor through.  If renal function OK, would restart IV aminoglycosides (tobramycin) - anticipate next 48 hours or so.  Would also give antifungal agent - Micafungin.    Potential further adjustment of antimicrobials after intraabdominal cultures.  Check for Cdiff.    I discussed my rationale and antimicrobial recommendations with the critical care attending  - Dr. Salinas

## 2016-12-30 NOTE — PROGRESS NOTE ADULT - ASSESSMENT
56 year old critical patient s/p SIPs procedure with free air concerning for anastomotic leak s/p emergent ex lap with abdominal washout found to have leak of esophagojejunostomy which they could not repair due to its location.  She is s/p esophageal stent placement, peritoneal washout and internal double pigtail stent placement. During endoscopy second defect noted on enteric side of anastomosis. Repeat endoscopy removed stent and placed a second stent over entire 5 cm anastomotic defect. 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 She has had repeated bronchoscopies with 12/13 and 12/24 bronchial wash growing MDR pseudomonas. She had a pigtail placed, roughly 600cc fluid removed with culture pending. In light of her bacteriology and resistance patterns after calling and speaking with microbiology lab as well as with Dr. Bains who was previously on the case (ID service) we recommend: 56 year old critical patient s/p SIPs procedure with free air concerning for anastomotic leak s/p emergent ex lap with abdominal washout found to have leak of esophagojejunostomy which they could not repair due to its location.  She is s/p esophageal stent placement, peritoneal washout and internal double pigtail stent placement. During endoscopy second defect noted on enteric side of anastomosis. Repeat endoscopy removed stent and placed a second stent over entire 5 cm anastomotic defect. 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 She has had repeated bronchoscopies with 12/13 and 12/24 bronchial wash growing MDR pseudomonas. She had a pigtail placed, roughly 600cc fluid removed with culture pending. In light of her bacteriology and resistance patterns after calling and speaking with microbiology lab as well as with Dr. Bains who was previously on the case (ID service) we recommend:  -D/c gentamycin; get trough from 24 hours from last dose given and once subtherapeutic (trough <0.5) we recommend starting IV tobramycin (do not start IV tobramycin today)  -Start nebulized Tobramycin now for anti-pseudomonal   -Switch imipenem to meropenem 2g q8 now with extended infusion (over 2 to 4 hours for each dose) for synergy with colistin against Pseudomonas, anti-strep and anaerobic coverage)  -Start micafungin 100mg QD now for yeast   -Start Colistin 100mg BID now for MDR anti-pseudomonal  -Drain and culture abdominal processes for attempted source control, this is a very virulent and resistant organism with an extensive depth of infection  -Will continue to follow

## 2016-12-30 NOTE — PROGRESS NOTE ADULT - CONSTITUTIONAL DETAILS
well-groomed/no distress/well-nourished/well-developed
well-groomed/obese/well-developed/well-nourished
well-groomed/well-developed/well-nourished/no distress
well-nourished/no distress/well-groomed/well-developed
well-nourished/well-groomed/no distress/well-developed
obese

## 2016-12-30 NOTE — PROGRESS NOTE ADULT - SUBJECTIVE AND OBJECTIVE BOX
S: Failed TOV overnight, stone replaced.     O:     Vital Signs Last 24 Hrs  T(C): 37.6, Max: 37.8 (12-29 @ 15:03)  T(F): 99.6, Max: 100 (12-29 @ 15:03)  HR: 89 (77 - 98)  BP: 151/75 (115/72 - 152/78)  BP(mean): 94 (79 - 107)  RR: 16 (14 - 34)  SpO2: 96% (92% - 99%)  I&O's Summary      Gen - NAD  Neuro - A&O  CV - RRR  Resp - Coarse Upper Airway Sounds  GI - Soft, NT, ND, Midline Vac, CHECO x 1, J Tube   - Stone  Ext - BLE Edema  Vasc -                           8.3    13.1  )-----------( 344      ( 30 Dec 2016 06:13 )             27.0   30 Dec 2016 06:13    139    |  98     |  14     ----------------------------<  101    3.8     |  32     |  0.43     Ca    9.6        30 Dec 2016 06:13  Phos  3.2       30 Dec 2016 06:13  Mg     2.2       30 Dec 2016 06:13

## 2016-12-30 NOTE — PROGRESS NOTE ADULT - SUBJECTIVE AND OBJECTIVE BOX
Interval Events:  Patient seen and examined at bedside.    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 She agreed to the CT insertion  PAST MEDICAL & SURGICAL HISTORY:  Reflux  Obesity  DVT (deep venous thrombosis): 2013  Depression  Diverticulitis  Hypertension  Gastric bypass status for obesity: gastric sleeve, 6/2012  S/P breast biopsy: 2011, left  S/P colon resection: 2011  S/P knee surgery: repair 2009, 2011  Umbilical hernia: 2000  S/P appendectomy: 1975  S/P tonsillectomy: 1967      MEDICATIONS:  Pulmonary:  ALBUTerol/ipratropium for Nebulization 3milliLiter(s) Nebulizer every 6 hours  acetylcysteine 20% Inhalation 3milliLiter(s) Inhalation every 8 hours    Antimicrobials:  fluconAZOLE IVPB 200milliGRAM(s) IV Intermittent every 24 hours  imipenem/cilastatin  IVPB  IV Intermittent   imipenem/cilastatin  IVPB 500milliGRAM(s) IV Intermittent every 6 hours  acyclovir IVPB 550milliGRAM(s) IV Intermittent every 8 hours  linezolid  IVPB  IV Intermittent     Anticoagulants:  heparin  flush 100 Units/mL Injectable 100Unit(s) IV Push every other day  heparin  Infusion 1800Unit(s)/Hr IV Continuous <Continuous>    Cardiac:      Allergies    sulfa drugs (Unknown)  sulfamethoxazole (Other)    Intolerances        Vital Signs Last 24 Hrs  T(C): 37.9, Max: 38.3 (12-24 @ 00:46)  T(F): 100.2, Max: 101 (12-24 @ 00:46)  HR: 89 (81 - 98)  BP: 119/66 (100/59 - 155/85)  BP(mean): 91 (72 - 828)  RR: 14 (14 - 20)  SpO2: 95% (92% - 99%)  I & Os for 24h ending 12-24 @ 07:00  =============================================  IN: 3622 ml / OUT: 1745 ml / NET: 1877 ml    I & Os for current day (as of 12-24 @ 21:05)  =============================================  IN: 1840 ml / OUT: 335 ml / NET: 1505 ml    Mode: AC/ CMV (Assist Control/ Continuous Mandatory Ventilation)  RR (machine): 14  TV (machine): 400  FiO2: 50  PEEP: 6  ITime: 1  MAP: 10  PIP: 21      LABS:      CBC Full  -  ( 24 Dec 2016 06:17 )  WBC Count : 10.2 K/uL  Hemoglobin : 9.0 g/dL  Hematocrit : 30.1 %  Platelet Count - Automated : 320 K/uL  Mean Cell Volume : 85.3 fL  Mean Cell Hemoglobin : 25.5 pg  Mean Cell Hemoglobin Concentration : 29.9 g/dL  Auto Neutrophil # : x  Auto Lymphocyte # : x  Auto Monocyte # : x  Auto Eosinophil # : x  Auto Basophil # : x  Auto Neutrophil % : x  Auto Lymphocyte % : x  Auto Monocyte % : x  Auto Eosinophil % : x  Auto Basophil % : x    24 Dec 2016 06:15    139    |  100    |  17     ----------------------------<  109    3.8     |  30     |  0.48     Ca    8.5        24 Dec 2016 06:15  Phos  2.7       24 Dec 2016 06:15  Mg     2.1       24 Dec 2016 06:15      PT/INR - ( 24 Dec 2016 06:17 )   PT: 15.4 sec;   INR: 1.38          PTT - ( 24 Dec 2016 15:43 )  PTT:63.9 sec              EXAM:  XR CHEST 1 VIEW PORT ROUTINE                           PROCEDURE DATE:  12/30/2016                 INTERPRETATION:  Chest x-ray    Indication: ICU patient    A portable frontal view of the chest is compared to the prior study dated   12/29/2016. No change in position of left chest tube. Tracheostomy is   again noted. Left basilar consolidation and small residual effusion are   without significant change. Right lung is grossly clear. No pneumothorax.   Gastric stent is again noted.      IMPRESSION: No significant change.    RADIOLOGY & ADDITIONAL STUDIES (The following images were personally reviewed):  Yu:                            Yes          Urine output:               Yes          DVT prophylaxis:         Yes          Flattus:                          Yes          Bowel movement:       Yes

## 2016-12-30 NOTE — PROGRESS NOTE ADULT - PROBLEM SELECTOR PLAN 1
Neuro: Dilaudid PRN, Tylenol PRN, Lexapro 20mg QDAY.  CVS: MAP > 70, normotensive goals. Abumin 25% Q8HR.    Pulm: Trach CPAP 10/7, Peridex. Duonebs. Mucomyst.  FEN/GI: NPO. J-tube TF to goal. Protonix.   : Yu.  Endo: ISS.   ID: MDR Pseudomonas (sputum) - Gent (12/26--) Imipenem (12/14--) \\ DC Vanc (12/13-14), Zosyn (12/3-14), Linezolid (12/24-25), Fluconazole (12/4-25); H. simplex Acyclovir (12/20-25).   Heme: SCDs. Heparin GTT goal 60-80.  Lines/Drains: R PICC /// DC - Left IJ TLC (12/3--), left IJ Juanjo (12/6-12/14). CHECO x 2. Feeding J-tube.  Wounds: Midline abdominal wound - wound vac (change T/TH/Sat).

## 2016-12-30 NOTE — PROGRESS NOTE ADULT - SUBJECTIVE AND OBJECTIVE BOX
INTERVAL HPI/OVERNIGHT EVENTS: Pigtail placed by Dr. Harper 2 days ago, draining serosanguinous fluid -currently 600cc. States her back pain is improved from yesterday (at chest tube site) and c/o abdominal pain that is worse today than yesterday.     ROS:  Abdominal pain     Allergies    sulfa drugs (Unknown)  sulfamethoxazole (Other)    Intolerances        ANTIBIOTICS/RELEVANT:  antimicrobials  imipenem/cilastatin  IVPB  IV Intermittent   imipenem/cilastatin  IVPB 500milliGRAM(s) IV Intermittent every 6 hours  gentamicin   IVPB 180milliGRAM(s) IV Intermittent every 24 hours    immunologic:    OTHER:  pantoprazole  Injectable 40milliGRAM(s) IV Push daily  insulin lispro (HumaLOG) corrective regimen sliding scale  SubCutaneous Before meals and at bedtime  heparin  flush 100 Units/mL Injectable 100Unit(s) IV Push every other day  ALBUTerol/ipratropium for Nebulization 3milliLiter(s) Nebulizer every 6 hours  acetaminophen    Suspension. 650milliGRAM(s) Oral every 6 hours PRN  acetylcysteine 20% Inhalation 3milliLiter(s) Inhalation every 8 hours  albumin human 25% IVPB 50milliLiter(s) IV Intermittent every 8 hours  acetaminophen    Suspension 650milliGRAM(s) Enteral Tube every 6 hours PRN  ondansetron Injectable 4milliGRAM(s) IV Push every 6 hours PRN  HYDROmorphone  Injectable 1milliGRAM(s) IV Push every 4 hours PRN  HYDROmorphone  Injectable 0.5milliGRAM(s) IV Push every 4 hours PRN  heparin  Infusion 1850Unit(s)/Hr IV Continuous <Continuous>  ascorbic acid Syrup 500milliGRAM(s) Oral daily  zinc sulfate 220milliGRAM(s) Oral daily  multivitamin 1Tablet(s) Oral daily      Objective:  Vital Signs Last 24 Hrs  T(C): 37.6, Max: 37.8 (12-29 @ 15:03)  T(F): 99.6, Max: 100 (12-29 @ 15:03)  HR: 74 (74 - 96)  BP: 141/85 (115/72 - 151/75)  BP(mean): 105 (79 - 105)  RR: 22 (14 - 34)  SpO2: 95% (92% - 98%)    PHYSICAL EXAM:  General - lethargic but arousable, no acute distress  HEENT - PERRL, EOMI, dry MM, no oral lesions noted   CV - S1, S2 RRR, no murmurs.   Resp - rhonchi in anterior chest fields, no wheezing   Abdomen - obese, soft, tenderness on palpation near wound vac site and J tube. Erythema noted along wound vac site as well. J tube, CHECO x1, midline vac.   Extremities - b/l LE edema, wwp, moving all extremities        LABS:                        8.3    13.1  )-----------( 344      ( 30 Dec 2016 06:13 )             27.0     30 Dec 2016 06:13    139    |  98     |  14     ----------------------------<  101    3.8     |  32     |  0.43     Ca    9.6        30 Dec 2016 06:13  Phos  3.2       30 Dec 2016 06:13  Mg     2.2       30 Dec 2016 06:13      PT/INR - ( 30 Dec 2016 06:13 )   PT: 15.3 sec;   INR: 1.37          PTT - ( 30 Dec 2016 06:13 )  PTT:76.6 sec        MICROBIOLOGY:  Bl Cx 12/16, 12/24: NGTD     Abd fluid 12/11: strep milleri, moderate yeast     Bronchial wash 12/13, 12/24: MDR pseudomonas  Bronchial wash 12/14: yeast    Sputum 12/27: Moderate Pseudomonas, still pending    Pleural fluid: yellow, , glucose 79, Total protein 4.4, Nuc cell count 570, RBC 4550, segmented granulocytes 22, lymphocytes 55, monocyte/macrophage 11, eosinophil 7, mesothelial cells 5    Pleural fluid Cx 12/28: pending        RADIOLOGY & ADDITIONAL STUDIES:  CT c/a/p w/ IV and green dye contrast 12/27: s/p gastrectomy and gastrojejunal anastomosis with a stent in place. 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  A few rim-enhancing fluid collections in the lower abdomen and pelvis which might be infected.Moderate to large left pleural effusion, enlarged since prior study. Small right pleural effusion. Extensive atelectatic changes in the lungs,   left greater than right.    CT c/a/p 12/21: 1.  Interval removal of endoluminal stent from the distal esophagus   extending to the jejunal loop. Interval placement of an esophageal stent. 2.  Slight interval decrease in small to moderate ascites, particularly   in the left upper quadrant surrounding the anastomosis. The largest pocket measures 1.3 x 6.6 cm in the midline upper pelvis, similar to the   prior study.3.  Further interval decrease in free air, with a a few punctate foci   remaining.4.  Slight interval decrease in small right pleural effusion. No change   in small left pleural effusion. Associated atelectasis bilaterally,   unchanged.5.  New anterior abdominal wall defect at the surgical incision site, presumably from interval debridement. 6.  Mild cardiomegaly and trace pericardial effusion, unchanged.  7.  Diffuse anasarca.        CXR 12/25: Improvement of opacification left hemithorax in comparison to   prior examination of the chest 12/25/2016. Bilateral effusions.   Underlying infiltrates cannot be excluded INTERVAL HPI/OVERNIGHT EVENTS: Pigtail placed by Dr. Harper 2 days ago, draining serosanguinous fluid -currently 600cc. States her back pain is improved from yesterday (at chest tube site) and c/o abdominal pain that is worse today than yesterday.     ROS:  Abdominal pain     Allergies    sulfa drugs (Unknown)  sulfamethoxazole (Other)      ANTIBIOTICS/RELEVANT:  antimicrobials  imipenem/cilastatin  IVPB  IV Intermittent   imipenem/cilastatin  IVPB 500milliGRAM(s) IV Intermittent every 6 hours  gentamicin   IVPB 180milliGRAM(s) IV Intermittent every 24 hours    OTHER:  pantoprazole  Injectable 40milliGRAM(s) IV Push daily  insulin lispro (HumaLOG) corrective regimen sliding scale  SubCutaneous Before meals and at bedtime  heparin  flush 100 Units/mL Injectable 100Unit(s) IV Push every other day  ALBUTerol/ipratropium for Nebulization 3milliLiter(s) Nebulizer every 6 hours  acetaminophen    Suspension. 650milliGRAM(s) Oral every 6 hours PRN  acetylcysteine 20% Inhalation 3milliLiter(s) Inhalation every 8 hours  albumin human 25% IVPB 50milliLiter(s) IV Intermittent every 8 hours  acetaminophen    Suspension 650milliGRAM(s) Enteral Tube every 6 hours PRN  ondansetron Injectable 4milliGRAM(s) IV Push every 6 hours PRN  HYDROmorphone  Injectable 1milliGRAM(s) IV Push every 4 hours PRN  HYDROmorphone  Injectable 0.5milliGRAM(s) IV Push every 4 hours PRN  heparin  Infusion 1850Unit(s)/Hr IV Continuous <Continuous>  ascorbic acid Syrup 500milliGRAM(s) Oral daily  zinc sulfate 220milliGRAM(s) Oral daily  multivitamin 1Tablet(s) Oral daily      Objective:  Vital Signs Last 24 Hrs  T(C): 37.6, Max: 37.8 (12-29 @ 15:03)  T(F): 99.6, Max: 100 (12-29 @ 15:03)  HR: 74 (74 - 96)  BP: 141/85 (115/72 - 151/75)  BP(mean): 105 (79 - 105)  RR: 22 (14 - 34)  SpO2: 95% (92% - 98%)    PHYSICAL EXAM:  General - lethargic but arousable, no acute distress  HEENT - PERRL, EOMI, dry MM, no oral lesions noted   CV - S1, S2 RRR, no murmurs.   Resp - rhonchi in anterior chest fields, no wheezing   Abdomen - obese, soft, tenderness on palpation near wound vac site and J tube. Erythema noted along wound vac site as well. J tube, CHECO x1, midline vac.   Extremities - b/l LE edema, wwp, moving all extremities      LABS:                        8.3    13.1  )-----------( 344      ( 30 Dec 2016 06:13 )             27.0     30 Dec 2016 06:13    139    |  98     |  14     ----------------------------<  101    3.8     |  32     |  0.43     Ca    9.6        30 Dec 2016 06:13  Phos  3.2       30 Dec 2016 06:13  Mg     2.2       30 Dec 2016 06:13      PT/INR - ( 30 Dec 2016 06:13 )   PT: 15.3 sec;   INR: 1.37          PTT - ( 30 Dec 2016 06:13 )  PTT:76.6 sec        MICROBIOLOGY:  Bl Cx 12/16, 12/24: NGTD     Abd fluid 12/11: strep milleri, moderate yeast     Bronchial wash 12/13, 12/24: MDR pseudomonas  Bronchial wash 12/14: yeast    Sputum 12/27: Moderate Pseudomonas, still pending    Pleural fluid: yellow, , glucose 79, Total protein 4.4, Nuc cell count 570, RBC 4550, segmented granulocytes 22, lymphocytes 55, monocyte/macrophage 11, eosinophil 7, mesothelial cells 5    Pleural fluid Cx 12/28: pending        RADIOLOGY & ADDITIONAL STUDIES:  CT c/a/p w/ IV and green dye contrast 12/27: s/p gastrectomy and gastrojejunal anastomosis with a stent in place. 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  A few rim-enhancing fluid collections in the lower abdomen and pelvis which might be infected.Moderate to large left pleural effusion, enlarged since prior study. Small right pleural effusion. Extensive atelectatic changes in the lungs,   left greater than right.    CT c/a/p 12/21: 1.  Interval removal of endoluminal stent from the distal esophagus   extending to the jejunal loop. Interval placement of an esophageal stent. 2.  Slight interval decrease in small to moderate ascites, particularly   in the left upper quadrant surrounding the anastomosis. The largest pocket measures 1.3 x 6.6 cm in the midline upper pelvis, similar to the   prior study.3.  Further interval decrease in free air, with a a few punctate foci   remaining.4.  Slight interval decrease in small right pleural effusion. No change   in small left pleural effusion. Associated atelectasis bilaterally,   unchanged.5.  New anterior abdominal wall defect at the surgical incision site, presumably from interval debridement. 6.  Mild cardiomegaly and trace pericardial effusion, unchanged.  7.  Diffuse anasarca.        CXR 12/25: Improvement of opacification left hemithorax in comparison to   prior examination of the chest 12/25/2016. Bilateral effusions.   Underlying infiltrates cannot be excluded        EXAM:  XR CHEST 1 VIEW PORT ROUTINE                           PROCEDURE DATE:  12/30/2016         INTERPRETATION:  Chest x-ray    Indication: ICU patient    A portable frontal view of the chest is compared to the prior study dated   12/29/2016. No change in position of left chest tube. Tracheostomy is   again noted. Left basilar consolidation and small residual effusion are   without significant change. Right lung is grossly clear. No pneumothorax.   Gastric stent is again noted.    IMPRESSION: No significant change.

## 2016-12-30 NOTE — PROGRESS NOTE ADULT - ASSESSMENT
56YOF s/p bypass revision following failed SIPS (anastamotic leak), found to have draining intra-abbdominal abscess, multiple bronchoscopies for mucous plugging.

## 2016-12-30 NOTE — PROGRESS NOTE ADULT - ATTENDING COMMENTS
Patient seen and examined with house-staff during bedside rounds  Resident note read, including vitals, physical findings, laboratory data, and radiological reports.  Case discussed with House staff  Direct personal management at bedside  and extensive interpretation of data. Decision making of high complexity.

## 2016-12-30 NOTE — PROGRESS NOTE ADULT - SUBJECTIVE AND OBJECTIVE BOX
i came to see today and appears that we are making progress  on cpap was on trach collar for 30 minutes yesterday  casey is minimal and connects to area on ct which is now decompressed and there is communication with vac  and has been in for around 28 days  thus will d/c as think will help area granulatein  on ct cavity virtually gone but extravasation that tracks to vac and output seems controlled   will keep npo with stent in place  and continue tube feedings  the stent will keep the aamir limb open and promote drainage to correct path in future  the reduction in cavity is optimistic  she aslo is breathing better after thoracentesis  working with pt and making progress

## 2016-12-31 NOTE — PROGRESS NOTE ADULT - ASSESSMENT
56YOF s/p bypass revision following failed SIPS (anastamotic leak), found to have draining intra-abbdominal abscess, multiple bronchs for mucous plugging. Will continue current care with plan to change antibiotics if patient decompensates.     Neuro: Dilaudid PRN, Tylenol PRN, Lexapro 20mg daily  CVS: MAP > 70, normotensive goals. abumin 25%q8h    Pulm: Trach CPAP/AC, Peridex. Duonebs. Mucomyst.  FEN/GI: NPO. J-tube TF to goal. Protonix. rectal tube (stg III ulcer)  : stone (stg III sacral ulcer)  Endo: ISS.   ID: MDR Pseudomonas (sputum) -  gent (12/26-) Imipenem (12/14--),\\\ DC Vanc (12/13-14), Zosyn (12/3-14), Linezolid (12/24-25),  fluconazole (12/4-25), - H. simplex Acyclovir (12/20-25)   Heme: SCDs, Heparin gtt goal 60-80  Lines/drains: R PICC ///dc: Left IJ TLC (12/3--), left IJ Juanjo (12/6-12/14). CHECO x 2. Feeding J-tube.  Wounds: Midline abdominal wound - wound vac (change T/TH/Sat).

## 2016-12-31 NOTE — PROGRESS NOTE ADULT - SUBJECTIVE AND OBJECTIVE BOX
Interval Events: 12/30: trach collar x 2hrs, then tachypneic. back on CPAP 10/7. O/N UOP decreased, given Lasix 40 x 1.    Patient seen and examined at bedside. No current complaints.       Allergies    sulfa drugs (Unknown)  sulfamethoxazole (Other)    Intolerances        Vital Signs Last 24 Hrs  T(C): 39.3, Max: 39.3 (12-31 @ 10:00)  T(F): 102.7, Max: 102.7 (12-31 @ 10:00)  HR: 108 (77 - 120)  BP: 141/63 (106/51 - 169/81)  BP(mean): 93 (69 - 121)  RR: 22 (16 - 32)  SpO2: 95% (90% - 99%)  I & Os for 24h ending 12-31 @ 07:00  =============================================  IN: 2804 ml / OUT: 1165 ml / NET: 1639 ml    I & Os for current day (as of 12-31 @ 11:17)  =============================================  IN: 448 ml / OUT: 1130 ml / NET: -682 ml  I & Os for 24h ending 12-31 @ 07:00  =============================================  IN: 2804 ml / OUT: 1165 ml / NET: 1639 ml    I & Os for current day (as of 12-31 @ 11:17)  =============================================  IN: 448 ml / OUT: 1130 ml / NET: -682 ml        LABS:      CBC Full  -  ( 31 Dec 2016 05:31 )  WBC Count : 14.3 K/uL  Hemoglobin : 7.3 g/dL  Hematocrit : 24.4 %  Platelet Count - Automated : 304 K/uL  Mean Cell Volume : 86.2 fL  Mean Cell Hemoglobin : 25.8 pg  Mean Cell Hemoglobin Concentration : 29.9 g/dL  Auto Neutrophil # : x  Auto Lymphocyte # : x  Auto Monocyte # : x  Auto Eosinophil # : x  Auto Basophil # : x  Auto Neutrophil % : 85.3 %  Auto Lymphocyte % : 6.6 %  Auto Monocyte % : 6.5 %  Auto Eosinophil % : 1.3 %  Auto Basophil % : 0.3 %    31 Dec 2016 05:31    135    |  94     |  13     ----------------------------<  305    3.3     |  29     |  0.38     Ca    9.1        31 Dec 2016 05:31  Phos  5.3       31 Dec 2016 05:31  Mg     2.3       31 Dec 2016 05:31      PT/INR - ( 31 Dec 2016 09:37 )   PT: 15.4 sec;   INR: 1.38          PTT - ( 31 Dec 2016 09:37 )  PTT:70.4 sec      RADIOLOGY & ADDITIONAL STUDIES (The following images were personally reviewed):      Physical Exam:     Gen - NAD. A&Ox3.  CV - sinus tachycardia  Resp - Coarse breath sounds with upper airway secretions.  GI - Soft, NT, ND, Midline Vac, CHECO x 1 with milky/serous output, J Tube in place with TF running at goal   - Yu to gravity draining clear urine.  Ext - BLE Edema

## 2017-01-01 NOTE — PROGRESS NOTE ADULT - SUBJECTIVE AND OBJECTIVE BOX
INTERVAL HPI/OVERNIGHT EVENTS:  Reviewed with SICU house staff    MEDICATIONS  (STANDING):  pantoprazole  Injectable 40milliGRAM(s) IV Push daily  insulin lispro (HumaLOG) corrective regimen sliding scale  SubCutaneous Before meals and at bedtime  heparin  flush 100 Units/mL Injectable 100Unit(s) IV Push every other day  ALBUTerol/ipratropium for Nebulization 3milliLiter(s) Nebulizer every 6 hours  acetylcysteine 20% Inhalation 3milliLiter(s) Inhalation every 8 hours  sodium chloride 0.9% lock flush 20milliLiter(s) IV Push once  albumin human 25% IVPB 50milliLiter(s) IV Intermittent every 8 hours  heparin  Infusion 1850Unit(s)/Hr IV Continuous <Continuous>  ascorbic acid Syrup 500milliGRAM(s) Oral daily  zinc sulfate 220milliGRAM(s) Oral daily  multivitamin 1Tablet(s) Oral daily  tobramycin for Nebulization 300milliGRAM(s) Inhalation every 12 hours  colistimethate IVPB 100milliGRAM(s) IV Intermittent every 12 hours  micafungin IVPB  IV Intermittent   micafungin IVPB 100milliGRAM(s) IV Intermittent every 24 hours  meropenem IVPB  IV Intermittent   meropenem IVPB 2000milliGRAM(s) IV Intermittent every 8 hours    MEDICATIONS  (PRN):  acetaminophen    Suspension. 650milliGRAM(s) Oral every 6 hours PRN Mild Pain (1 - 3)  sodium chloride 0.9% lock flush 10milliLiter(s) IV Push every 1 hour PRN After each medication administration  sodium chloride 0.9% lock flush 10milliLiter(s) IV Push every 12 hours PRN Lumen of catheter NOT used  acetaminophen    Suspension 650milliGRAM(s) Enteral Tube every 6 hours PRN For Temp greater than 38 C (100.4 F)  ondansetron Injectable 4milliGRAM(s) IV Push every 6 hours PRN Nausea and/or Vomiting  HYDROmorphone  Injectable 1milliGRAM(s) IV Push every 4 hours PRN Severe Pain (7 - 10)  HYDROmorphone  Injectable 0.5milliGRAM(s) IV Push every 4 hours PRN Moderate Pain (4 - 6)      Allergies    sulfa drugs (Unknown)  sulfamethoxazole (Other)    EXAM    Vital Signs Last 24 Hrs  T(C): 37.9, Max: 38.3 (01-01 @ 00:00)  T(F): 100.3, Max: 100.9 (01-01 @ 00:00)  HR: 92 (83 - 102)  BP: 129/75 (99/60 - 161/81)  BP(mean): 93 (76 - 106)  RR: 13 (13 - 25)  SpO2: 98% (95% - 99%)  Awake  No diffuse rash  RRR  Chest with rhonchi and decrease BS at bases; secretions still thick  Abd obese and soft;  wound VAC; feeding jejunostomy; CHECO removed  RT with loose stools  Yu with urine output  PICC exit OK  Right Buttock small decubs; the largest stage 3     LABS:                        7.2    12.0  )-----------( 305      ( 01 Jan 2017 05:25 )             23.8     01 Jan 2017 05:24    137    |  96     |  16     ----------------------------<  236    3.8     |  31     |  0.46     Ca    9.0        01 Jan 2017 05:24  Phos  4.7       01 Jan 2017 05:24  Mg     2.3       01 Jan 2017 05:24      PT/INR - ( 31 Dec 2016 09:37 )   PT: 15.4 sec;   INR: 1.38          PTT - ( 01 Jan 2017 09:19 )  PTT:59.2 sec    GENT trough 0.9 (12/30)    MICROBIOLOGY:    Culture - Body Fluid with Gram Stain (12.30.16 @ 10:46)    Gram Stain:   Numerous Gram Negative Rods  Few Gram positive cocci in pairs  Few WBC's  Rare epithelial cells    Specimen Source: .Body Fluid R sided CHECO, peritoneal fluid    Culture Results:   Numerous Pseudomonas aeruginosa Susceptibility to follow.  Moderate Gram Positive Cocci in Pairs and Chains  Identification and susceptibility to follow.    Culture - Body Fluid with Gram Stain (12.28.16 @ 20:18)    Gram Stain:   No organisms seen  No WBC's seen.    Specimen Source: .Body Fluid Pleural Fluid    Culture Results:   No growth    CXR slightly improved

## 2017-01-01 NOTE — PROGRESS NOTE ADULT - ASSESSMENT
56YOF s/p bypass revision following failed SIPS (anastamotic leak), found to have draining intra-abdominal abscess, multiple bronchs for mucous plugging, last bronch on 12/24 now with bronchial wash growing MDR pseudomonas    Neuro: Dilaudid PRN, Tylenol PRN, Lexapro 20mg daily  CVS: MAP > 70, normotensive goals. albumin 25%q8h    Pulm: Trach CPAP 10/7, Peridex. Duonebs. Mucomyst.  FEN/GI: NPO. J-tube TF to goal. Protonix. rectal tube (stg III ulcer)  : stone (stg III sacral ulcer)  Endo: ISS.   ID: MDR Pseudomonas (sputum) - micafungin (12/31- ), colistin (12/31- ), meropenem (12/31- ), tobramycin (12/31- ) \\\ DC gent (12/26-) Imipenem (12/14--), Vanc (12/13-14), Zosyn (12/3-14), Linezolid (12/24-25),  fluconazole (12/4-25), - H. simplex Acyclovir (12/20-25)   Heme: SCDs, Heparin gtt goal 60-80  Lines/drains: R PICC ///dc: Left IJ TLC (12/3--), left IJ Juanjo (12/6-12/14). CHECO x 2. Feeding J-tube.  Wounds: Midline abdominal wound - wound vac (change T/TH/Sat).

## 2017-01-01 NOTE — PROGRESS NOTE ADULT - SUBJECTIVE AND OBJECTIVE BOX
Interval Events: 12/31: Spiked to 102.7 - abx changed to micafungin, colistin, meropenem, tobramycin. Vac changed. PTT 70, heparin unchanged. O/N: Tmax 100.9    Patient seen and examined at bedside. c/o rectal pain. Denies abdominal pain/CP/SOB      Allergies    sulfa drugs (Unknown)  sulfamethoxazole (Other)    Intolerances        Vital Signs Last 24 Hrs  T(C): 37.7, Max: 39.3 (12-31 @ 10:00)  T(F): 99.8, Max: 102.7 (12-31 @ 10:00)  HR: 89 (74 - 108)  BP: 117/67 (105/63 - 161/81)  BP(mean): 85 (74 - 106)  RR: 21 (14 - 25)  SpO2: 97% (95% - 99%)  I & Os for 24h ending 01-01 @ 07:00  =============================================  IN: 2697 ml / OUT: 2100 ml / NET: 597 ml    I & Os for current day (as of 01-01 @ 09:14)  =============================================  IN: 90 ml / OUT: 40 ml / NET: 50 ml  I & Os for 24h ending 01-01 @ 07:00  =============================================  IN: 2697 ml / OUT: 2100 ml / NET: 597 ml    I & Os for current day (as of 01-01 @ 09:14)  =============================================  IN: 90 ml / OUT: 40 ml / NET: 50 ml        LABS:      CBC Full  -  ( 01 Jan 2017 05:25 )  WBC Count : 12.0 K/uL  Hemoglobin : 7.2 g/dL  Hematocrit : 23.8 %  Platelet Count - Automated : 305 K/uL  Mean Cell Volume : 87.2 fL  Mean Cell Hemoglobin : 26.4 pg  Mean Cell Hemoglobin Concentration : 30.3 g/dL      01 Jan 2017 05:24    137    |  96     |  16     ----------------------------<  236    3.8     |  31     |  0.46     Ca    9.0        01 Jan 2017 05:24  Phos  4.7       01 Jan 2017 05:24  Mg     2.3       01 Jan 2017 05:24      PT/INR - ( 31 Dec 2016 09:37 )   PT: 15.4 sec;   INR: 1.38          PTT - ( 31 Dec 2016 09:37 )  PTT:70.4 sec        Physical Exam:     Gen - NAD. A&Ox3.  CV - sinus tachycardia  Resp - Coarse breath sounds with upper airway secretions.  GI - Soft, NT, ND, Midline Vac, CHECO x 1 with milky/serous output, J Tube in place with TF running at goal   - Yu to gravity draining clear urine.  Ext - BLE Edema

## 2017-01-01 NOTE — PROGRESS NOTE ADULT - ASSESSMENT
S/P Bariatric surgery with anastomotic leak(s)  Intraabdominal collection/fluid - prioly with polymicrobial roland including yeast  Respiratory failure and pneumonia  - XDR Pseudomonas  Fever    RECOMMEND  Blood cultures  Check stool for Cdiff  Abdominal fluid/collection sampling and drainage  Continue Colistin plus "synergistic" Meropenem  Continue nebulized tobramycin  Redose aminoglycoside IV - would use tobramycin 150 mg x 1   Continue Micafungin  Decub care

## 2017-01-02 NOTE — PROGRESS NOTE ADULT - SUBJECTIVE AND OBJECTIVE BOX
AISSATOU DAVIS  [id number]  56y  Female  sulfa drugs (Unknown)  sulfamethoxazole (Other)    O/N given 100mg of tobramycin IV as per BRAYDEN Koo, placed back on ACV due to increased work of breathing after being on CPAP    Patient seen and examined at bedside. No current complaints.     T(C): 38.1, Max: 38.3 (01-01 @ 22:16)  HR: 86 (78 - 97)  BP: 137/89 (95/57 - 139/73)  RR: 20 (13 - 21)  SpO2: 98% (96% - 100%)  Wt(kg): --    I & Os for 24h ending 01-02 @ 07:00  =============================================  IN: 2900 ml / OUT: 1145 ml / NET: 1755 ml    I & Os for current day (as of 01-02 @ 09:42)  =============================================  IN: 53 ml / OUT: 30 ml / NET: 23 ml                            7.1    9.7   )-----------( 276      ( 02 Jan 2017 05:13 )             23.8       02 Jan 2017 05:12    138    |  98     |  17     ----------------------------<  165    3.8     |  30     |  0.41     Ca    9.2        02 Jan 2017 05:12  Phos  3.8       02 Jan 2017 05:12  Mg     2.3       02 Jan 2017 05:12    TPro  6.5    /  Alb  2.6    /  TBili  0.6    /  DBili  x      /  AST  24     /  ALT  32     /  AlkPhos  157    02 Jan 2017 05:12    Physical Exam:     Gen - NAD. A&Ox3.  CV - sinus tachycardia  Resp - Coarse breath sounds with upper airway secretions.  GI - Soft, NT, ND, Midline Vac, J Tube in place with TF running at goal   - Yu to gravity draining clear urine.  Ext - BLE Edema          pantoprazole  Injectable 40milliGRAM(s) IV Push daily  insulin lispro (HumaLOG) corrective regimen sliding scale  SubCutaneous Before meals and at bedtime  heparin  flush 100 Units/mL Injectable 100Unit(s) IV Push every other day  ALBUTerol/ipratropium for Nebulization 3milliLiter(s) Nebulizer every 6 hours  acetaminophen    Suspension. 650milliGRAM(s) Oral every 6 hours PRN  acetylcysteine 20% Inhalation 3milliLiter(s) Inhalation every 8 hours  sodium chloride 0.9% lock flush 10milliLiter(s) IV Push every 1 hour PRN  sodium chloride 0.9% lock flush 10milliLiter(s) IV Push every 12 hours PRN  sodium chloride 0.9% lock flush 20milliLiter(s) IV Push once  albumin human 25% IVPB 50milliLiter(s) IV Intermittent every 8 hours  acetaminophen    Suspension 650milliGRAM(s) Enteral Tube every 6 hours PRN  ondansetron Injectable 4milliGRAM(s) IV Push every 6 hours PRN  heparin  Infusion 1850Unit(s)/Hr IV Continuous <Continuous>  ascorbic acid Syrup 500milliGRAM(s) Oral daily  zinc sulfate 220milliGRAM(s) Oral daily  multivitamin 1Tablet(s) Oral daily  tobramycin for Nebulization 300milliGRAM(s) Inhalation every 12 hours  colistimethate IVPB 100milliGRAM(s) IV Intermittent every 12 hours  micafungin IVPB  IV Intermittent   micafungin IVPB 100milliGRAM(s) IV Intermittent every 24 hours  meropenem IVPB  IV Intermittent   meropenem IVPB 2000milliGRAM(s) IV Intermittent every 8 hours  HYDROmorphone  Injectable 1milliGRAM(s) IV Push every 4 hours PRN  HYDROmorphone  Injectable 0.5milliGRAM(s) IV Push every 4 hours PRN      GASTRIC FISTULA/ ESOPHAGSTRICTURE  GASTRIC FISTULA/ ESOPHAGESTRICTURE  GASTRIC FISTULA/ ESOPHAGEALSTRICTURE  No h/o HF  No pertinent family history in first degree relatives  Handoff  MEWS Score  Reflux  Obesity  DVT (deep venous thrombosis)  Depression  Diverticulitis  Hypertension  Mucus plugging of bronchi  Respiratory failure  Enteroenteric fistula  Mucus plugging of bronchi  Respiratory failure  Enteroenteric fistula  Obesity  Pneumonia, bacterial  Pleural effusion  Hypertension  DVT (deep venous thrombosis)  Anastomotic leak of intestine  Hypophosphatemia  Hypernatremia  Hypokalemia  Anemia  ASA (acute kidney injury)  Obesity  Tracheostomy, percutaneous  Bronchoscopy  Gastric bypass status for obesity  S/P breast biopsy  S/P colon resection  S/P knee surgery  Umbilical hernia  S/P appendectomy  S/P tonsillectomy AISSATOU DAVIS  [id number]  56y  Female  sulfa drugs (Unknown)  sulfamethoxazole (Other)    O/N given 100mg of tobramycin IV as per BRAYDEN Koo, placed back on ACV due to increased work of breathing after being on CPAP    Patient seen and examined at bedside. No current complaints.     T(C): 38.1, Max: 38.3 (01-01 @ 22:16)  HR: 86 (78 - 97)  BP: 137/89 (95/57 - 139/73)  RR: 20 (13 - 21)  SpO2: 98% (96% - 100%)  Wt(kg): --    I & Os for 24h ending 01-02 @ 07:00  =============================================  IN: 2900 ml / OUT: 1145 ml / NET: 1755 ml    I & Os for current day (as of 01-02 @ 09:42)  =============================================  IN: 53 ml / OUT: 30 ml / NET: 23 ml                            7.1    9.7   )-----------( 276      ( 02 Jan 2017 05:13 )             23.8       02 Jan 2017 05:12    138    |  98     |  17     ----------------------------<  165    3.8     |  30     |  0.41     Ca    9.2        02 Jan 2017 05:12  Phos  3.8       02 Jan 2017 05:12  Mg     2.3       02 Jan 2017 05:12    TPro  6.5    /  Alb  2.6    /  TBili  0.6    /  DBili  x      /  AST  24     /  ALT  32     /  AlkPhos  157    02 Jan 2017 05:12    Physical Exam:     Gen - NAD. A&Ox3.  CV - RRR S m/g/r/, +S1, +S2  Resp - Coarse breath sounds with upper airway secretions.  GI - Soft, NT, ND, Midline Vac, J Tube in place with TF running at goal   - Yu to gravity draining clear urine.  Ext - BLE Edema          pantoprazole  Injectable 40milliGRAM(s) IV Push daily  insulin lispro (HumaLOG) corrective regimen sliding scale  SubCutaneous Before meals and at bedtime  heparin  flush 100 Units/mL Injectable 100Unit(s) IV Push every other day  ALBUTerol/ipratropium for Nebulization 3milliLiter(s) Nebulizer every 6 hours  acetaminophen    Suspension. 650milliGRAM(s) Oral every 6 hours PRN  acetylcysteine 20% Inhalation 3milliLiter(s) Inhalation every 8 hours  sodium chloride 0.9% lock flush 10milliLiter(s) IV Push every 1 hour PRN  sodium chloride 0.9% lock flush 10milliLiter(s) IV Push every 12 hours PRN  sodium chloride 0.9% lock flush 20milliLiter(s) IV Push once  albumin human 25% IVPB 50milliLiter(s) IV Intermittent every 8 hours  acetaminophen    Suspension 650milliGRAM(s) Enteral Tube every 6 hours PRN  ondansetron Injectable 4milliGRAM(s) IV Push every 6 hours PRN  heparin  Infusion 1850Unit(s)/Hr IV Continuous <Continuous>  ascorbic acid Syrup 500milliGRAM(s) Oral daily  zinc sulfate 220milliGRAM(s) Oral daily  multivitamin 1Tablet(s) Oral daily  tobramycin for Nebulization 300milliGRAM(s) Inhalation every 12 hours  colistimethate IVPB 100milliGRAM(s) IV Intermittent every 12 hours  micafungin IVPB  IV Intermittent   micafungin IVPB 100milliGRAM(s) IV Intermittent every 24 hours  meropenem IVPB  IV Intermittent   meropenem IVPB 2000milliGRAM(s) IV Intermittent every 8 hours  HYDROmorphone  Injectable 1milliGRAM(s) IV Push every 4 hours PRN  HYDROmorphone  Injectable 0.5milliGRAM(s) IV Push every 4 hours PRN      GASTRIC FISTULA/ ESOPHAGSTRICTURE  GASTRIC FISTULA/ ESOPHAGESTRICTURE  GASTRIC FISTULA/ ESOPHAGEALSTRICTURE  No h/o HF  No pertinent family history in first degree relatives  Handoff  MEWS Score  Reflux  Obesity  DVT (deep venous thrombosis)  Depression  Diverticulitis  Hypertension  Mucus plugging of bronchi  Respiratory failure  Enteroenteric fistula  Mucus plugging of bronchi  Respiratory failure  Enteroenteric fistula  Obesity  Pneumonia, bacterial  Pleural effusion  Hypertension  DVT (deep venous thrombosis)  Anastomotic leak of intestine  Hypophosphatemia  Hypernatremia  Hypokalemia  Anemia  ASA (acute kidney injury)  Obesity  Tracheostomy, percutaneous  Bronchoscopy  Gastric bypass status for obesity  S/P breast biopsy  S/P colon resection  S/P knee surgery  Umbilical hernia  S/P appendectomy  S/P tonsillectomy AISSATOU DAVIS  [id number]  56y  Female  sulfa drugs (Unknown)  sulfamethoxazole (Other)    O/N given 100mg of tobramycin IV as per BRAYDEN Koo, placed back on ACV due to increased work of breathing after being on CPAP    Patient seen and examined at bedside. No current complaints.     T(C): 38.1, Max: 38.3 (01-01 @ 22:16)  HR: 86 (78 - 97)  BP: 137/89 (95/57 - 139/73)  RR: 20 (13 - 21)  SpO2: 98% (96% - 100%)  Wt(kg): --    I & Os for 24h ending 01-02 @ 07:00  =============================================  IN: 2900 ml / OUT: 1145 ml / NET: 1755 ml    I & Os for current day (as of 01-02 @ 09:42)  =============================================  IN: 53 ml / OUT: 30 ml / NET: 23 ml                            7.1    9.7   )-----------( 276      ( 02 Jan 2017 05:13 )             23.8       02 Jan 2017 05:12    138    |  98     |  17     ----------------------------<  165    3.8     |  30     |  0.41     Ca    9.2        02 Jan 2017 05:12  Phos  3.8       02 Jan 2017 05:12  Mg     2.3       02 Jan 2017 05:12    TPro  6.5    /  Alb  2.6    /  TBili  0.6    /  DBili  x      /  AST  24     /  ALT  32     /  AlkPhos  157    02 Jan 2017 05:12    Physical Exam:     Gen - NAD. A&Ox3.  CV - RRR s m/g/r/, +S1, +S2  Resp - Coarse breath sounds with upper airway secretions.  GI - Soft, NT, ND, Midline Vac, J Tube in place with TF running at goal   - Yu to gravity draining clear urine.  Ext - BLE Edema          pantoprazole  Injectable 40milliGRAM(s) IV Push daily  insulin lispro (HumaLOG) corrective regimen sliding scale  SubCutaneous Before meals and at bedtime  heparin  flush 100 Units/mL Injectable 100Unit(s) IV Push every other day  ALBUTerol/ipratropium for Nebulization 3milliLiter(s) Nebulizer every 6 hours  acetaminophen    Suspension. 650milliGRAM(s) Oral every 6 hours PRN  acetylcysteine 20% Inhalation 3milliLiter(s) Inhalation every 8 hours  sodium chloride 0.9% lock flush 10milliLiter(s) IV Push every 1 hour PRN  sodium chloride 0.9% lock flush 10milliLiter(s) IV Push every 12 hours PRN  sodium chloride 0.9% lock flush 20milliLiter(s) IV Push once  albumin human 25% IVPB 50milliLiter(s) IV Intermittent every 8 hours  acetaminophen    Suspension 650milliGRAM(s) Enteral Tube every 6 hours PRN  ondansetron Injectable 4milliGRAM(s) IV Push every 6 hours PRN  heparin  Infusion 1850Unit(s)/Hr IV Continuous <Continuous>  ascorbic acid Syrup 500milliGRAM(s) Oral daily  zinc sulfate 220milliGRAM(s) Oral daily  multivitamin 1Tablet(s) Oral daily  tobramycin for Nebulization 300milliGRAM(s) Inhalation every 12 hours  colistimethate IVPB 100milliGRAM(s) IV Intermittent every 12 hours  micafungin IVPB  IV Intermittent   micafungin IVPB 100milliGRAM(s) IV Intermittent every 24 hours  meropenem IVPB  IV Intermittent   meropenem IVPB 2000milliGRAM(s) IV Intermittent every 8 hours  HYDROmorphone  Injectable 1milliGRAM(s) IV Push every 4 hours PRN  HYDROmorphone  Injectable 0.5milliGRAM(s) IV Push every 4 hours PRN      GASTRIC FISTULA/ ESOPHAGSTRICTURE  GASTRIC FISTULA/ ESOPHAGESTRICTURE  GASTRIC FISTULA/ ESOPHAGEALSTRICTURE  No h/o HF  No pertinent family history in first degree relatives  Handoff  MEWS Score  Reflux  Obesity  DVT (deep venous thrombosis)  Depression  Diverticulitis  Hypertension  Mucus plugging of bronchi  Respiratory failure  Enteroenteric fistula  Mucus plugging of bronchi  Respiratory failure  Enteroenteric fistula  Obesity  Pneumonia, bacterial  Pleural effusion  Hypertension  DVT (deep venous thrombosis)  Anastomotic leak of intestine  Hypophosphatemia  Hypernatremia  Hypokalemia  Anemia  ASA (acute kidney injury)  Obesity  Tracheostomy, percutaneous  Bronchoscopy  Gastric bypass status for obesity  S/P breast biopsy  S/P colon resection  S/P knee surgery  Umbilical hernia  S/P appendectomy  S/P tonsillectomy AISSATOU DAVIS  [id number]  56y  Female  sulfa drugs (Unknown)  sulfamethoxazole (Other)    O/N given 100mg of tobramycin IV as per BRAYDEN Koo, placed back on ACV due to increased work of breathing after being on CPAP    Patient seen and examined at bedside. No current complaints.     T(C): 38.1, Max: 38.3 (01-01 @ 22:16)  HR: 86 (78 - 97)  BP: 137/89 (95/57 - 139/73)  RR: 20 (13 - 21)  SpO2: 98% (96% - 100%)  Wt(kg): --    I & Os for 24h ending 01-02 @ 07:00  =============================================  IN: 2900 ml / OUT: 1145 ml / NET: 1755 ml    I & Os for current day (as of 01-02 @ 09:42)  =============================================  IN: 53 ml / OUT: 30 ml / NET: 23 ml                            7.1    9.7   )-----------( 276      ( 02 Jan 2017 05:13 )             23.8       02 Jan 2017 05:12    138    |  98     |  17     ----------------------------<  165    3.8     |  30     |  0.41     Ca    9.2        02 Jan 2017 05:12  Phos  3.8       02 Jan 2017 05:12  Mg     2.3       02 Jan 2017 05:12    TPro  6.5    /  Alb  2.6    /  TBili  0.6    /  DBili  x      /  AST  24     /  ALT  32     /  AlkPhos  157    02 Jan 2017 05:12    Physical Exam:     Gen - NAD. A&Ox3.  CV - RRR s m/g/r/,S1/S2.  Resp - Coarse breath sounds with upper airway secretions.  GI - Soft, NT, ND, Midline Vac, J Tube in place with TF running at goal   - Yu to gravity draining clear urine.  Ext - BLE Edema          pantoprazole  Injectable 40milliGRAM(s) IV Push daily  insulin lispro (HumaLOG) corrective regimen sliding scale  SubCutaneous Before meals and at bedtime  heparin  flush 100 Units/mL Injectable 100Unit(s) IV Push every other day  ALBUTerol/ipratropium for Nebulization 3milliLiter(s) Nebulizer every 6 hours  acetaminophen    Suspension. 650milliGRAM(s) Oral every 6 hours PRN  acetylcysteine 20% Inhalation 3milliLiter(s) Inhalation every 8 hours  sodium chloride 0.9% lock flush 10milliLiter(s) IV Push every 1 hour PRN  sodium chloride 0.9% lock flush 10milliLiter(s) IV Push every 12 hours PRN  sodium chloride 0.9% lock flush 20milliLiter(s) IV Push once  albumin human 25% IVPB 50milliLiter(s) IV Intermittent every 8 hours  acetaminophen    Suspension 650milliGRAM(s) Enteral Tube every 6 hours PRN  ondansetron Injectable 4milliGRAM(s) IV Push every 6 hours PRN  heparin  Infusion 1850Unit(s)/Hr IV Continuous <Continuous>  ascorbic acid Syrup 500milliGRAM(s) Oral daily  zinc sulfate 220milliGRAM(s) Oral daily  multivitamin 1Tablet(s) Oral daily  tobramycin for Nebulization 300milliGRAM(s) Inhalation every 12 hours  colistimethate IVPB 100milliGRAM(s) IV Intermittent every 12 hours  micafungin IVPB  IV Intermittent   micafungin IVPB 100milliGRAM(s) IV Intermittent every 24 hours  meropenem IVPB  IV Intermittent   meropenem IVPB 2000milliGRAM(s) IV Intermittent every 8 hours  HYDROmorphone  Injectable 1milliGRAM(s) IV Push every 4 hours PRN  HYDROmorphone  Injectable 0.5milliGRAM(s) IV Push every 4 hours PRN      GASTRIC FISTULA/ ESOPHAGSTRICTURE  GASTRIC FISTULA/ ESOPHAGESTRICTURE  GASTRIC FISTULA/ ESOPHAGEALSTRICTURE  No h/o HF  No pertinent family history in first degree relatives  Handoff  MEWS Score  Reflux  Obesity  DVT (deep venous thrombosis)  Depression  Diverticulitis  Hypertension  Mucus plugging of bronchi  Respiratory failure  Enteroenteric fistula  Mucus plugging of bronchi  Respiratory failure  Enteroenteric fistula  Obesity  Pneumonia, bacterial  Pleural effusion  Hypertension  DVT (deep venous thrombosis)  Anastomotic leak of intestine  Hypophosphatemia  Hypernatremia  Hypokalemia  Anemia  ASA (acute kidney injury)  Obesity  Tracheostomy, percutaneous  Bronchoscopy  Gastric bypass status for obesity  S/P breast biopsy  S/P colon resection  S/P knee surgery  Umbilical hernia  S/P appendectomy  S/P tonsillectomy AISSATOU DAVIS  [id number]  56y  Female  sulfa drugs (Unknown)  sulfamethoxazole (Other)    O/N given 100mg of tobramycin IV as per BRAYDEN Koo, placed back on ACV due to increased work of breathing after being on CPAP    Patient seen and examined at bedside. No current complaints.     T(C): 38.1, Max: 38.3 (01-01 @ 22:16)  HR: 86 (78 - 97)  BP: 137/89 (95/57 - 139/73)  RR: 20 (13 - 21)  SpO2: 98% (96% - 100%)  Wt(kg): --    I & Os for 24h ending 01-02 @ 07:00  =============================================  IN: 2900 ml / OUT: 1145 ml / NET: 1755 ml    I & Os for current day (as of 01-02 @ 09:42)  =============================================  IN: 53 ml / OUT: 30 ml / NET: 23 ml                            7.1    9.7   )-----------( 276      ( 02 Jan 2017 05:13 )             23.8       02 Jan 2017 05:12    138    |  98     |  17     ----------------------------<  165    3.8     |  30     |  0.41     Ca    9.2        02 Jan 2017 05:12  Phos  3.8       02 Jan 2017 05:12  Mg     2.3       02 Jan 2017 05:12    TPro  6.5    /  Alb  2.6    /  TBili  0.6    /  DBili  x      /  AST  24     /  ALT  32     /  AlkPhos  157    02 Jan 2017 05:12    Physical Exam:     Gen - NAD. A&Ox3.  CV - RRR s m/g/r/, +S1/S2.  Resp - Coarse breath sounds with upper airway secretions.  GI - Soft, NT, ND, Midline Vac, J Tube in place with TF running at goal   - Yu to gravity draining clear urine.  Ext - BLE Edema          pantoprazole  Injectable 40milliGRAM(s) IV Push daily  insulin lispro (HumaLOG) corrective regimen sliding scale  SubCutaneous Before meals and at bedtime  heparin  flush 100 Units/mL Injectable 100Unit(s) IV Push every other day  ALBUTerol/ipratropium for Nebulization 3milliLiter(s) Nebulizer every 6 hours  acetaminophen    Suspension. 650milliGRAM(s) Oral every 6 hours PRN  acetylcysteine 20% Inhalation 3milliLiter(s) Inhalation every 8 hours  sodium chloride 0.9% lock flush 10milliLiter(s) IV Push every 1 hour PRN  sodium chloride 0.9% lock flush 10milliLiter(s) IV Push every 12 hours PRN  sodium chloride 0.9% lock flush 20milliLiter(s) IV Push once  albumin human 25% IVPB 50milliLiter(s) IV Intermittent every 8 hours  acetaminophen    Suspension 650milliGRAM(s) Enteral Tube every 6 hours PRN  ondansetron Injectable 4milliGRAM(s) IV Push every 6 hours PRN  heparin  Infusion 1850Unit(s)/Hr IV Continuous <Continuous>  ascorbic acid Syrup 500milliGRAM(s) Oral daily  zinc sulfate 220milliGRAM(s) Oral daily  multivitamin 1Tablet(s) Oral daily  tobramycin for Nebulization 300milliGRAM(s) Inhalation every 12 hours  colistimethate IVPB 100milliGRAM(s) IV Intermittent every 12 hours  micafungin IVPB  IV Intermittent   micafungin IVPB 100milliGRAM(s) IV Intermittent every 24 hours  meropenem IVPB  IV Intermittent   meropenem IVPB 2000milliGRAM(s) IV Intermittent every 8 hours  HYDROmorphone  Injectable 1milliGRAM(s) IV Push every 4 hours PRN  HYDROmorphone  Injectable 0.5milliGRAM(s) IV Push every 4 hours PRN      GASTRIC FISTULA/ ESOPHAGSTRICTURE  GASTRIC FISTULA/ ESOPHAGESTRICTURE  GASTRIC FISTULA/ ESOPHAGEALSTRICTURE  No h/o HF  No pertinent family history in first degree relatives  Handoff  MEWS Score  Reflux  Obesity  DVT (deep venous thrombosis)  Depression  Diverticulitis  Hypertension  Mucus plugging of bronchi  Respiratory failure  Enteroenteric fistula  Mucus plugging of bronchi  Respiratory failure  Enteroenteric fistula  Obesity  Pneumonia, bacterial  Pleural effusion  Hypertension  DVT (deep venous thrombosis)  Anastomotic leak of intestine  Hypophosphatemia  Hypernatremia  Hypokalemia  Anemia  ASA (acute kidney injury)  Obesity  Tracheostomy, percutaneous  Bronchoscopy  Gastric bypass status for obesity  S/P breast biopsy  S/P colon resection  S/P knee surgery  Umbilical hernia  S/P appendectomy  S/P tonsillectomy

## 2017-01-02 NOTE — PROGRESS NOTE ADULT - ASSESSMENT
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 abumin 25%q8h    Pulm: Trach CPAP 10/7, Peridex. Duonebs. Mucomyst.  FEN/GI: NPO. J-tube TF to goal. Protonix. rectal tube (stg III ulcer)  : stone (stg III sacral ulcer)  Endo: ISS.   ID: MDR Pseudomonas (sputum) - micafungin (12/31- ), colistin (12/31- ), meropenem (12/31- ), tobramycin (12/31- ) \\\ DC gent (12/26-) Imipenem (12/14--), Vanc (12/13-14), Zosyn (12/3-14), Linezolid (12/24-25),  fluconazole (12/4-25), - H. simplex Acyclovir (12/20-25)   Heme: SCDs, Heparin gtt goal 60-80  Lines/drains: R PICC ///dc: Left IJ TLC (12/3--), left IJ Juanjo (12/6-12/14).Feeding J-tube.  Wounds: Midline abdominal wound - wound vac (change T/TH/Sat).

## 2017-01-03 NOTE — PROGRESS NOTE ADULT - SUBJECTIVE AND OBJECTIVE BOX
Appears better  on trach collar  wound looks much better  no residual drainage when vac is removed  wbc is fine  will repeat ct scan and continue to mobilize as well as possible  discussed plan in depth with [facility] team  determine need for repeat endoscopy after ct scan

## 2017-01-03 NOTE — PROGRESS NOTE ADULT - ATTENDING COMMENTS
Patient examined with DANNI Hairston and DR. Brady. Skin is without rash. PERRL, EOMI, tongue moist, throat clear. Neck supple, no JVD. Lungs with decreased BS at bases. RRR S1S2, Abd soft distention with wound tenderness and +BS. Wound is granulating with no discharge. Vac reapplied.  Ext 2+ edema. Vasc with 2+ pulses. MSK no cyanosis or clubbing. Psych alert and O X 3.    Continue trach on TC with CPAP as needed for respiratory distress. Will try to mobilize more aggressively. Stop mucomyst.  Continuing colistin, meropenem, micafungin, nebulized tobramycin for pseudomonas/yeast.  Tolerating jejunal feeds and wound granulation suggests adequate nutritional intake.  CV otherwise stable, have stopped supplemental albumin and will try to diurese for edema.  Renal fxn is stable.

## 2017-01-03 NOTE — PROGRESS NOTE ADULT - SUBJECTIVE AND OBJECTIVE BOX
Interval Events:  Patient seen and examined at bedside.    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 She agreed to the CT insertion  PAST MEDICAL & SURGICAL HISTORY:  Reflux  Obesity  DVT (deep venous thrombosis): 2013  Depression  Diverticulitis  Hypertension  Gastric bypass status for obesity: gastric sleeve, 6/2012  S/P breast biopsy: 2011, left  S/P colon resection: 2011  S/P knee surgery: repair 2009, 2011  Umbilical hernia: 2000  S/P appendectomy: 1975  S/P tonsillectomy: 1967      MEDICATIONS:  Pulmonary:  ALBUTerol/ipratropium for Nebulization 3milliLiter(s) Nebulizer every 6 hours  acetylcysteine 20% Inhalation 3milliLiter(s) Inhalation every 8 hours    Antimicrobials:  fluconAZOLE IVPB 200milliGRAM(s) IV Intermittent every 24 hours  imipenem/cilastatin  IVPB  IV Intermittent   imipenem/cilastatin  IVPB 500milliGRAM(s) IV Intermittent every 6 hours  acyclovir IVPB 550milliGRAM(s) IV Intermittent every 8 hours  linezolid  IVPB  IV Intermittent     Anticoagulants:  heparin  flush 100 Units/mL Injectable 100Unit(s) IV Push every other day  heparin  Infusion 1800Unit(s)/Hr IV Continuous <Continuous>    Cardiac:      Allergies    sulfa drugs (Unknown)  sulfamethoxazole (Other)    Intolerances        Vital Signs Last 24 Hrs  T(C): 37.9, Max: 38.3 (12-24 @ 00:46)  T(F): 100.2, Max: 101 (12-24 @ 00:46)  HR: 89 (81 - 98)  BP: 119/66 (100/59 - 155/85)  BP(mean): 91 (72 - 828)  RR: 14 (14 - 20)  SpO2: 95% (92% - 99%)  I & Os for 24h ending 12-24 @ 07:00  =============================================  IN: 3622 ml / OUT: 1745 ml / NET: 1877 ml    I & Os for current day (as of 12-24 @ 21:05)  =============================================  IN: 1840 ml / OUT: 335 ml / NET: 1505 ml    Mode: AC/ CMV (Assist Control/ Continuous Mandatory Ventilation)  RR (machine): 14  TV (machine): 400  FiO2: 50  PEEP: 6  ITime: 1  MAP: 10  PIP: 21      LABS:      CBC Full  -  ( 24 Dec 2016 06:17 )  WBC Count : 10.2 K/uL  Hemoglobin : 9.0 g/dL  Hematocrit : 30.1 %  Platelet Count - Automated : 320 K/uL  Mean Cell Volume : 85.3 fL  Mean Cell Hemoglobin : 25.5 pg  Mean Cell Hemoglobin Concentration : 29.9 g/dL  Auto Neutrophil # : x  Auto Lymphocyte # : x  Auto Monocyte # : x  Auto Eosinophil # : x  Auto Basophil # : x  Auto Neutrophil % : x  Auto Lymphocyte % : x  Auto Monocyte % : x  Auto Eosinophil % : x  Auto Basophil % : x    24 Dec 2016 06:15    139    |  100    |  17     ----------------------------<  109    3.8     |  30     |  0.48     Ca    8.5        24 Dec 2016 06:15  Phos  2.7       24 Dec 2016 06:15  Mg     2.1       24 Dec 2016 06:15      PT/INR - ( 24 Dec 2016 06:17 )   PT: 15.4 sec;   INR: 1.38          PTT - ( 24 Dec 2016 15:43 )  PTT:63.9 sec              RADIOLOGY & ADDITIONAL STUDIES (The following images werXAM:  XR CHEST 1 VIEW PORT ROUTINE                           PROCEDURE DATE:  01/03/2017                 INTERPRETATION:  XR CHEST 1 VIEW PORT ROUTINE DATED 1/3/2017 4:19 AM    INDICATION: 56 years-old Female with SICU.    PRIOR STUDIES: Chest x-ray from 1/2/2017    FINDINGS: AP view of the chest demonstrates slightly increased bibasilar   opacities which could indicate infiltrates or pleural effusions. No   pneumothorax. Heart size stable. No other change.    IMPRESSION:  Findings suggestive of mildly increased congestion.  e personally reviewed):  Yu:                            Yes          Urine output:               Yes          DVT prophylaxis:         Yes          Flattus:                          Yes          Bowel movement:       Yes

## 2017-01-03 NOTE — PROGRESS NOTE ADULT - ASSESSMENT
56YOF s/p bypass revision following failed SIPS (anastamotic leak), found to have draining intra-abdominal abscess, multiple bronchs for mucous plugging    Neuro: Dilaudid PRN, Tylenol PRN, Lexapro 20mg daily  CVS: MAP > 70, normotensive goals. albumin 25%q8h    Pulm: Trach CPAP 10/7, Peridex. Duonebs. Mucomyst.  FEN/GI: NPO. J-tube TF to goal. Protonix. rectal tube (stg III ulcer)  : stone (stg III sacral ulcer)  Endo: ISS.   ID: MDR Pseudomonas (sputum and peritoneal fluid) - micafungin (12/31- ), colistin (12/31- ), meropenem (12/31- ), tobramycin (12/31- ) \\\ DC gent (12/26-) Imipenem (12/14--), Vanc (12/13-14), Zosyn (12/3-14), Linezolid (12/24-25),  fluconazole (12/4-25), - H. simplex Acyclovir (12/20-25)   Heme: SCDs, Heparin gtt goal 60-80  Lines/drains: R PICC ///dc: Left IJ TLC (12/3--), left IJ Juanjo (12/6-12/14).Feeding J-tube.  Wounds: Midline abdominal wound - wound vac (change T/TH/Sat). 56YOF s/p bypass revision following failed SIPS (anastamotic leak), found to have draining intra-abdominal abscess, HAP, multiple bronchs for mucous plugging    Neuro: Dilaudid PRN, Tylenol PRN, Lexapro 20mg daily  CVS: MAP > 70, normotensive goals.   Pulm: Trach CPAP 10/7, Peridex. Duonebs.  FEN/GI: NPO. J-tube TF to goal. Protonix. rectal tube (stg III ulcer)  : stone (stg III sacral ulcer)  Endo: ISS.   ID: MDR Pseudomonas (sputum and peritoneal fluid) - micafungin (12/31- ), colistin (12/31- ), meropenem (12/31- ), tobramycin (12/31- ) \\\ DC gent (12/26-) Imipenem (12/14--), Vanc (12/13-14), Zosyn (12/3-14), Linezolid (12/24-25),  fluconazole (12/4-25), - H. simplex Acyclovir (12/20-25)   Heme: SCDs, Heparin gtt goal 60-80  Lines/drains: R PICC ///dc: Left IJ TLC (12/3--), left IJ Juanjo (12/6-12/14).Feeding J-tube.  Wounds: Midline abdominal wound - wound vac (change T/TH/Sat).

## 2017-01-03 NOTE — PROGRESS NOTE ADULT - SUBJECTIVE AND OBJECTIVE BOX
Interval Events: 1/2: Wound vac changed. Tolerating trach collar. Peritoneal fluid growing MDR pseudomonas. O/N: Spiked fever to 102.6.  Given tylenol.  Sent blood cultures. Placed on CPAP for tachypnea.    Patient seen and examined at bedside. Denies abdominal pain, SOB, CP, N/V.      Allergies    sulfa drugs (Unknown)  sulfamethoxazole (Other)    Intolerances        Vital Signs Last 24 Hrs  T(C): 37.7, Max: 39.2 (01-02 @ 21:16)  T(F): 99.8, Max: 102.6 (01-02 @ 21:16)  HR: 90 (80 - 109)  BP: 149/73 (117/68 - 169/90)  BP(mean): 102 (83 - 112)  RR: 25 (17 - 37)  SpO2: 98% (95% - 100%)    I & Os for current day (as of 01-03 @ 07:17)  =============================================  IN: 2401 ml / OUT: 1490 ml / NET: 911 ml    I & Os for current day (as of 01-03 @ 07:17)  =============================================  IN: 2401 ml / OUT: 1490 ml / NET: 911 ml        LABS:      CBC Full  -  ( 03 Jan 2017 05:27 )  WBC Count : 10.8 K/uL  Hemoglobin : 7.6 g/dL  Hematocrit : 24.9 %  Platelet Count - Automated : 268 K/uL  Mean Cell Volume : 86.5 fL  Mean Cell Hemoglobin : 26.4 pg  Mean Cell Hemoglobin Concentration : 30.5 g/dL      03 Jan 2017 05:27    136    |  99     |  18     ----------------------------<  103    4.3     |  31     |  0.43     Ca    10.0       03 Jan 2017 05:27  Phos  2.7       03 Jan 2017 05:27  Mg     2.2       03 Jan 2017 05:27    TPro  6.5    /  Alb  2.6    /  TBili  0.6    /  DBili  x      /  AST  24     /  ALT  32     /  AlkPhos  157    02 Jan 2017 05:12    PT/INR - ( 03 Jan 2017 06:30 )   PT: 13.8 sec;   INR: 1.24          PTT - ( 03 Jan 2017 05:28 )  PTT:>200.0 sec      Physical Exam:   Gen - NAD. A&Ox3.  CV - RRR  Resp - decreased BS at b/l bases.  GI - Soft, NTND, Midline Vac to continuous suction, J Tube in place with TF running at goal   - Yu to gravity draining clear urine.  Ext - BLE Edema. KELLEY x 4. Strength 5/5 x 4. Interval Events: 1/2: Wound vac changed. Tolerating trach collar. Peritoneal fluid growing MDR pseudomonas. O/N: Spiked fever to 102.6.  Given tylenol.  Sent blood cultures. Placed on CPAP for tachypnea.    Patient seen and examined at bedside. Denies abdominal pain, SOB, CP, N/V on ROS.      Allergies    sulfa drugs (Unknown)  sulfamethoxazole (Other)    Intolerances        Vital Signs Last 24 Hrs  T(C): 37.7, Max: 39.2 (01-02 @ 21:16)  T(F): 99.8, Max: 102.6 (01-02 @ 21:16)  HR: 90 (80 - 109)  BP: 149/73 (117/68 - 169/90)  BP(mean): 102 (83 - 112)  RR: 25 (17 - 37)  SpO2: 98% (95% - 100%)    I & Os for current day (as of 01-03 @ 07:17)  =============================================  IN: 2401 ml / OUT: 1490 ml / NET: 911 ml    I & Os for current day (as of 01-03 @ 07:17)  =============================================  IN: 2401 ml / OUT: 1490 ml / NET: 911 ml        LABS:      CBC Full  -  ( 03 Jan 2017 05:27 )  WBC Count : 10.8 K/uL  Hemoglobin : 7.6 g/dL  Hematocrit : 24.9 %  Platelet Count - Automated : 268 K/uL  Mean Cell Volume : 86.5 fL  Mean Cell Hemoglobin : 26.4 pg  Mean Cell Hemoglobin Concentration : 30.5 g/dL      03 Jan 2017 05:27    136    |  99     |  18     ----------------------------<  103    4.3     |  31     |  0.43     Ca    10.0       03 Jan 2017 05:27  Phos  2.7       03 Jan 2017 05:27  Mg     2.2       03 Jan 2017 05:27    TPro  6.5    /  Alb  2.6    /  TBili  0.6    /  DBili  x      /  AST  24     /  ALT  32     /  AlkPhos  157    02 Jan 2017 05:12    PT/INR - ( 03 Jan 2017 06:30 )   PT: 13.8 sec;   INR: 1.24          PTT - ( 03 Jan 2017 05:28 )  PTT:>200.0 sec      Physical Exam:   Gen - NAD. A&Ox3.  CV - RRR  Resp - decreased BS at b/l bases.  GI - Soft, NTND, Midline Vac to continuous suction, J Tube in place with TF running at goal   - Yu to gravity draining clear urine.  Ext - BLE Edema. KELLEY x 4. Strength 5/5 x 4.

## 2017-01-04 NOTE — PROGRESS NOTE ADULT - PROBLEM SELECTOR PLAN 1
continue antibiotics.  She is improving pulmonary wise and tolerating T collar trials and possible downsizing the trach to size 6

## 2017-01-04 NOTE — PROGRESS NOTE ADULT - CONSTITUTIONAL
detailed exam
Well-developed, well nourished
detailed exam

## 2017-01-04 NOTE — PROGRESS NOTE ADULT - SUBJECTIVE AND OBJECTIVE BOX
S: Pt reports pain in her rectum exacerbated by sitting, no CP or SOB    Vitals: Vital Signs Last 24 Hrs  T(C): 37.3, Max: 37.8 (01-03 @ 17:36)  T(F): 99.2, Max: 100.1 (01-03 @ 22:04)  HR: 100 (88 - 112)  BP: 147/79 (112/71 - 160/72)  BP(mean): 103 (84 - 116)  RR: 23 (20 - 29)  SpO2: 94% (93% - 100%)    CAPILLARY BLOOD GLUCOSE  94 (03 Jan 2017 16:00)      I & Os for 24h ending 01-04 @ 07:00  =============================================  IN: 1981 ml / OUT: 2710 ml / NET: -729 ml    I & Os for current day (as of 01-04 @ 09:44)  =============================================  IN: 106 ml / OUT: 220 ml / NET: -114 ml          Labs:                         8.4    11.6  )-----------( 238      ( 04 Jan 2017 05:02 )             27.8   04 Jan 2017 05:02    137    |  98     |  16     ----------------------------<  108    4.3     |  31     |  0.44     Ca    10.3       04 Jan 2017 05:02  Phos  3.2       04 Jan 2017 05:02  Mg     2.1       04 Jan 2017 05:02    PT/INR - ( 03 Jan 2017 06:30 )   PT: 13.8 sec;   INR: 1.24          PTT - ( 04 Jan 2017 05:02 )  PTT:55.8 sec    Electrolytes repleated to goals as follows: Potassium 4, Phosphorus 3 and Magnesium 2 where appropriate and necessary    Exam:  Neuro: A&Ox3, NAD, grossly neuro intact  CV: RRR, no MRGs  Pulm: bilateral rhonchi, worst at apices and R>L, decreased b/l lower field breath sounds  Abd: BS (+), Soft, obese, NT, central wound vac in place with sunction intact, no surrounding ecchymosis, port sites without visible drainage.   Ext: Global edema 2+  Vasc: Extremities warm to palpation, 2+ pulses - radial and PT S: Pt reports pain in her rectum exacerbated by sitting, no CP or SOB    Vitals: Vital Signs Last 24 Hrs  T(C): 37.3, Max: 37.8 (01-03 @ 17:36)  T(F): 99.2, Max: 100.1 (01-03 @ 22:04)  HR: 100 (88 - 112)  BP: 147/79 (112/71 - 160/72)  BP(mean): 103 (84 - 116)  RR: 23 (20 - 29)  SpO2: 94% (93% - 100%)    CAPILLARY BLOOD GLUCOSE  94 (03 Jan 2017 16:00)      I & Os for 24h ending 01-04 @ 07:00  =============================================  IN: 1981 ml / OUT: 2710 ml / NET: -729 ml    I & Os for current day (as of 01-04 @ 09:44)  =============================================  IN: 106 ml / OUT: 220 ml / NET: -114 ml          Labs:                         8.4    11.6  )-----------( 238      ( 04 Jan 2017 05:02 )             27.8   04 Jan 2017 05:02    137    |  98     |  16     ----------------------------<  108    4.3     |  31     |  0.44     Ca    10.3       04 Jan 2017 05:02  Phos  3.2       04 Jan 2017 05:02  Mg     2.1       04 Jan 2017 05:02    PT/INR - ( 03 Jan 2017 06:30 )   PT: 13.8 sec;   INR: 1.24          PTT - ( 04 Jan 2017 05:02 )  PTT:55.8 sec    Electrolytes repleated to goals as follows: Potassium 4, Phosphorus 3 and Magnesium 2 where appropriate and necessary    Exam:  Neuro: A&Ox3, NAD, grossly neuro intact  CV: RRR, no MRGs  Pulm: bilateral rhonchi, worst at apices and R>L, decreased b/l lower field breath sounds  Abd: BS (+), Soft, obese, NT, central wound vac in place with sunction intact, no surrounding ecchymosis, port sites without visible drainage.   : rectal tube in place, no rectal ttp or in surrounding area. Reports ttp of sacral decub and surrounding area which appears erythematous.   Ext: Global edema 1+, 2+ in b/l LEs  Vasc: Extremities warm to palpation, 2+ pulses - radial and PT S: Pt reports pain in her rectum exacerbated by sitting, no HA, CP, SOB, N/V    Vitals: Vital Signs Last 24 Hrs  T(C): 37.3, Max: 37.8 (01-03 @ 17:36)  T(F): 99.2, Max: 100.1 (01-03 @ 22:04)  HR: 100 (88 - 112)  BP: 147/79 (112/71 - 160/72)  BP(mean): 103 (84 - 116)  RR: 23 (20 - 29)  SpO2: 94% (93% - 100%)    CAPILLARY BLOOD GLUCOSE  94 (03 Jan 2017 16:00)      I & Os for 24h ending 01-04 @ 07:00  =============================================  IN: 1981 ml / OUT: 2710 ml / NET: -729 ml    I & Os for current day (as of 01-04 @ 09:44)  =============================================  IN: 106 ml / OUT: 220 ml / NET: -114 ml          Labs:                         8.4    11.6  )-----------( 238      ( 04 Jan 2017 05:02 )             27.8   04 Jan 2017 05:02    137    |  98     |  16     ----------------------------<  108    4.3     |  31     |  0.44     Ca    10.3       04 Jan 2017 05:02  Phos  3.2       04 Jan 2017 05:02  Mg     2.1       04 Jan 2017 05:02    PT/INR - ( 03 Jan 2017 06:30 )   PT: 13.8 sec;   INR: 1.24          PTT - ( 04 Jan 2017 05:02 )  PTT:55.8 sec    Electrolytes repleated to goals as follows: Potassium 4, Phosphorus 3 and Magnesium 2 where appropriate and necessary    Exam:  Neuro: A&Ox3, NAD, grossly neuro intact  CV: RRR, no MRGs  Pulm: bilateral rhonchi, worst at apices and R>L, decreased b/l lower field breath sounds  Abd: BS (+), Soft, obese, NT, central wound vac in place with sunction intact, no surrounding ecchymosis, port sites without visible drainage.   : stone and rectal tube in place, no rectal ttp or in surrounding area.   Skin: Stage 3 sacral decub clean and dressed.   Ext: Global edema 1+, 2+ in b/l LEs  Vasc: Extremities warm to palpation, 2+ pulses - radial and PT

## 2017-01-04 NOTE — PROGRESS NOTE ADULT - SUBJECTIVE AND OBJECTIVE BOX
Interval Events: reviewed  Patient seen and examined at bedside.    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 (30 Nov 2016 17:30)  She's doing better she/order breath just sore with the chest tube site  She is doing OK and she is off the ventilator  PAST MEDICAL & SURGICAL HISTORY:  Reflux  Obesity  DVT (deep venous thrombosis): 2013  Depression  Diverticulitis  Hypertension  Gastric bypass status for obesity: gastric sleeve, 6/2012  S/P breast biopsy: 2011, left  S/P colon resection: 2011  S/P knee surgery: repair 2009, 2011  Umbilical hernia: 2000  S/P appendectomy: 1975  S/P tonsillectomy: 1967      MEDICATIONS:  Pulmonary:  ALBUTerol/ipratropium for Nebulization 3milliLiter(s) Nebulizer every 6 hours  acetylcysteine 20% Inhalation 3milliLiter(s) Inhalation every 8 hours    Antimicrobials:  fluconAZOLE IVPB 200milliGRAM(s) IV Intermittent every 24 hours  imipenem/cilastatin  IVPB  IV Intermittent   imipenem/cilastatin  IVPB 500milliGRAM(s) IV Intermittent every 6 hours  acyclovir IVPB 550milliGRAM(s) IV Intermittent every 8 hours  linezolid  IVPB  IV Intermittent     Anticoagulants:  heparin  flush 100 Units/mL Injectable 100Unit(s) IV Push every other day  heparin  Infusion 1800Unit(s)/Hr IV Continuous <Continuous>    Cardiac:      Allergies    sulfa drugs (Unknown)  sulfamethoxazole (Other)    Intolerances        Vital Signs Last 24 Hrs  T(C): 37.9, Max: 38.3 (12-24 @ 00:46)  T(F): 100.2, Max: 101 (12-24 @ 00:46)  HR: 89 (81 - 98)  BP: 119/66 (100/59 - 155/85)  BP(mean): 91 (72 - 828)  RR: 14 (14 - 20)  SpO2: 95% (92% - 99%)  I & Os for 24h ending 12-24 @ 07:00  =============================================  IN: 3622 ml / OUT: 1745 ml / NET: 1877 ml    I & Os for current day (as of 12-24 @ 21:05)  =============================================  IN: 1840 ml / OUT: 335 ml / NET: 1505 ml    Mode: AC/ CMV (Assist Control/ Continuous Mandatory Ventilation)  RR (machine): 14  TV (machine): 400  FiO2: 50  PEEP: 6  ITime: 1  MAP: 10  PIP: 21      LABS:      CBC Full  -  ( 24 Dec 2016 06:17 )  WBC Count : 10.2 K/uL  Hemoglobin : 9.0 g/dL  Hematocrit : 30.1 %  Platelet Count - Automated : 320 K/uL  Mean Cell Volume : 85.3 fL  Mean Cell Hemoglobin : 25.5 pg  Mean Cell Hemoglobin Concentration : 29.9 g/dL  Auto Neutrophil # : x  Auto Lymphocyte # : x  Auto Monocyte # : x  Auto Eosinophil # : x  Auto Basophil # : x  Auto Neutrophil % : x  Auto Lymphocyte % : x  Auto Monocyte % : x  Auto Eosinophil % : x  Auto Basophil % : x    24 Dec 2016 06:15    139    |  100    |  17     ----------------------------<  109    3.8     |  30     |  0.48     Ca    8.5        24 Dec 2016 06:15  Phos  2.7       24 Dec 2016 06:15  Mg     2.1       24 Dec 2016 06:15      PT/INR - ( 24 Dec 2016 06:17 )   PT: 15.4 sec;   INR: 1.38          PTT - ( 24 Dec 2016 15:43 )  PTT:63.9 sec              RADIOLOGY & ADDITIONAL STUDIES (The following images werXAM:  XR CHEST 1 VIEW PORT ROUTINE                           PROCEDURE DATE:  01/03/2017               .    EXAM:  XR CHEST 1 VIEW PORT ROUTINE                           PROCEDURE DATE:  01/03/2017       INTERPRETATION:  XR CHEST 1 VIEW PORT ROUTINE DATED 1/3/2017 4:19 AM    INDICATION: 56 years-old Female with SICU.    PRIOR STUDIES: Chest x-ray from 1/2/2017    FINDINGS: AP view of the chest demonstrates slightly increased bibasilar   opacities which could indicate infiltrates or pleural effusions. No   pneumothorax. Heart size stable. No other change.    IMPRESSION:  Findings suggestive of mildly increased congestion.   CT scan     . Persistent localized peripherally enhancing fluid collections within   the pelvis most consistent with  intraperitoneal abscesses. These have not significantly changed in size   when compared to the prior  study.  2. Status post gastrectomy and gastrojejunal anastomosis with endoluminal   stentin place. There has  been removal of the nasogastric tube.  3. Subcutaneous emphysema along the anterior abdominal wall which may be   related to  enterocutaneous fistulae.  4. Grossly stable small bore percutaneous surgical drain in the right mid   abdomen terminating in the  left midabdomen with interval removal of the larger bore percutaneous   drainage catheter previously  noted in the right midabdomen.  5. Stable hepatosplenomeIMPRESSION:  lluvia.  6. Stable appearance of the left kidney demonstrating dilatation of the   left renal pelvis versus a  parapelvic cyst and small 1 cm left cortical cyst.  7. Slightly decreasing subcutaneous edema  Thank you for allowing us to participate in the care of your patient.  Dictated and Authenticated by: Faby Venegas DO  01/04/2017 2:45 AM Eastern Time ([place] & [place])         Yu:                            Yes          Urine output:               Yes          DVT prophylaxis:         Yes          Flattus:                          Yes          Bowel movement:       Yes

## 2017-01-04 NOTE — PROGRESS NOTE ADULT - BACK
No deformity or limitation of movement

## 2017-01-04 NOTE — PROGRESS NOTE ADULT - SUBJECTIVE AND OBJECTIVE BOX
patient looks much improved  on trach collar  ct reviewed no oral contrast but no large inflammatory process around stent or abscess cavity  vac drain is 120 cc for 24 hours  discussed with team need to get out of bed and increase pt  also no change in collection in pelvis and discussed plan with icu staff  continue tube feeds  increase time out of bed to at least 2 hours am and 2 hours pm and increase  keep on trach collar   when stable consider down sizing  still with pulmonary findings on ct   best to increase activity

## 2017-01-04 NOTE — PROGRESS NOTE ADULT - NS NEC GEN PE MLT EXAM PC
detailed exam
No bruits; no thyromegaly or nodules

## 2017-01-04 NOTE — PROGRESS NOTE ADULT - SUBJECTIVE AND OBJECTIVE BOX
INTERVAL HPI/OVERNIGHT EVENTS: Pt VAC was changed, repeat CT scan performed yesterday with findings below. She is c/o abdominal itch and mild pain.     ROS:  See above, febrile, abdominal pain +pruritis     Allergies    sulfa drugs (Unknown)  sulfamethoxazole (Other)    Intolerances        ANTIBIOTICS/RELEVANT:  antimicrobials  tobramycin for Nebulization 300milliGRAM(s) Inhalation every 12 hours  colistimethate IVPB 100milliGRAM(s) IV Intermittent every 12 hours  micafungin IVPB  IV Intermittent   micafungin IVPB 100milliGRAM(s) IV Intermittent every 24 hours  tobramycin IVPB 150milliGRAM(s) IV Intermittent once  meropenem IVPB 2000milliGRAM(s) IV Intermittent every 8 hours    immunologic:    OTHER:  pantoprazole  Injectable 40milliGRAM(s) IV Push daily  insulin lispro (HumaLOG) corrective regimen sliding scale  SubCutaneous Before meals and at bedtime  heparin  flush 100 Units/mL Injectable 100Unit(s) IV Push every other day  ALBUTerol/ipratropium for Nebulization 3milliLiter(s) Nebulizer every 6 hours  acetaminophen    Suspension. 650milliGRAM(s) Oral every 6 hours PRN  acetaminophen    Suspension 650milliGRAM(s) Enteral Tube every 6 hours PRN  ondansetron Injectable 4milliGRAM(s) IV Push every 6 hours PRN  ascorbic acid Syrup 500milliGRAM(s) Oral daily  zinc sulfate 220milliGRAM(s) Oral daily  HYDROmorphone  Injectable 1milliGRAM(s) IV Push every 4 hours PRN  HYDROmorphone  Injectable 0.5milliGRAM(s) IV Push every 4 hours PRN  diphenhydrAMINE   Injectable 25milliGRAM(s) IntraMuscular every 4 hours PRN  enoxaparin Injectable 80milliGRAM(s) SubCutaneous <User Schedule>  multivitamin 1Tablet(s) Oral daily  potassium chloride   Powder 40milliEquivalent(s) Oral once      Objective:  Vital Signs Last 24 Hrs  T(C): 37.3, Max: 38.4 (01-04 @ 11:00)  T(F): 99.1, Max: 101.1 (01-04 @ 11:00)  HR: 106 (88 - 112)  BP: 171/83 (108/67 - 171/83)  BP(mean): 105 (72 - 116)  RR: 29 (18 - 29)  SpO2: 99% (92% - 100%)    PHYSICAL EXAM:  General - awake, alert, no acute distress   HEENT - MMM, PERRL, EOMI, no oral lesions noted   CV - S1, S2 RRR no murmurs   Resp - Decreased breath sounds b/l, rhonchi in anterior chest fields +trach  Abdomen - soft, obese +BS, tenderness to palpation around wound vac site with mild erythema, +wound vac, +J tube, +rectal tube   Extremities- moving all extremities, wwp, +2 pulses distal b/l       LABS:                        8.4    11.6  )-----------( 238      ( 04 Jan 2017 05:02 )             27.8     04 Jan 2017 05:02    137    |  98     |  16     ----------------------------<  108    4.3     |  31     |  0.44     Ca    10.3       04 Jan 2017 05:02  Phos  3.2       04 Jan 2017 05:02  Mg     2.1       04 Jan 2017 05:02      PT/INR - ( 03 Jan 2017 06:30 )   PT: 13.8 sec;   INR: 1.24          PTT - ( 04 Jan 2017 05:02 )  PTT:55.8 sec        MICROBIOLOGY:  Bl Cx 12/16, 12/24: NGTD     Abd fluid 12/11: strep milleri, moderate yeast     Bronchial wash 12/13, 12/24: MDR pseudomonas  Bronchial wash 12/14: yeast    Sputum 12/27: Moderate Pseudomonas, MDR        Pleural fluid: yellow, , glucose 79, Total protein 4.4, Nuc cell count 570, RBC 4550, segmented granulocytes 22, lymphocytes 55, monocyte/macrophage 11, eosinophil 7, mesothelial cells 5    Pleural fluid Cx 12/28: No growth    Abd fluid CHECO 12/30: MDR pseudomonas           RADIOLOGY & ADDITIONAL STUDIES:  CT c/a/p 1/3: Complete collapse of the right lower lobe. Complete collapse of the left   lower lobe. Small bilateral pleural effusions. 1. Persistent localized peripherally enhancing fluid collections within the pelvis most consistent with intraperitoneal abscesses. These have not significantly changed in size   when compared to the prior  Study. 2. S/p gastrectomy and gastrojejunal anastomosis with endoluminal stent in place. There has been removal of the nasogastric tube. 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 4. Grossly stable small bore percutaneous surgical drain in the right mid abdomen terminating in the left midabdomen with interval removal of the larger bore percutaneous  drainage catheter previously noted in the right midabdomen.  5. Stable hepatosplenomegaly.  6. Stable appearance of the left kidney demonstrating dilatation of the left renal pelvis versus a  parapelvic cyst and small 1 cm left cortical cyst. 7. Slightly decreasing subcutaneous edema      CXR 1/1: mild improvement in congestion and/or infiltrates     CT c/a/p w/ IV and green dye contrast 12/27: s/p gastrectomy and gastrojejunal anastomosis with a stent in place. 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  A few rim-enhancing fluid collections in the lower abdomen and pelvis which might be infected.Moderate to large left pleural effusion, enlarged since prior study. Small right pleural effusion. Extensive atelectatic changes in the lungs,   left greater than right.    CT c/a/p 12/21: 1.  Interval removal of endoluminal stent from the distal esophagus   extending to the jejunal loop. Interval placement of an esophageal stent. 2.  Slight interval decrease in small to moderate ascites, particularly   in the left upper quadrant surrounding the anastomosis. The largest pocket measures 1.3 x 6.6 cm in the midline upper pelvis, similar to the   prior study.3.  Further interval decrease in free air, with a a few punctate foci   remaining.4.  Slight interval decrease in small right pleural effusion. No change   in small left pleural effusion. Associated atelectasis bilaterally,   unchanged.5.  New anterior abdominal wall defect at the surgical incision site, presumably from interval debridement. 6.  Mild cardiomegaly and trace pericardial effusion, unchanged.  7.  Diffuse anasarca.        CXR 12/25: Improvement of opacification left hemithorax in comparison to   prior examination of the chest 12/25/2016. Bilateral effusions.   Underlying infiltrates cannot be excluded

## 2017-01-04 NOTE — PROGRESS NOTE ADULT - ASSESSMENT
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 abumin 25%q8h, lasix prn    Pulm: Trach collar/CPAP 10/7, Peridex. Duonebs. Mucomyst.  FEN/GI: NPO. J-tube TF to goal. Protonix. rectal tube (stg III ulcer)  : stone (stg III sacral ulcer)  Endo: ISS.   ID: MDR Pseudomonas (sputum and peritoneal fluid) - micafungin (12/31- ), colistin (12/31- ), meropenem (12/31- ), tobramycin (12/31- ) \\\ DC gent (12/26-) Imipenem (12/14--), Vanc (12/13-14), Zosyn (12/3-14), Linezolid (12/24-25),  fluconazole (12/4-25), - H. simplex Acyclovir (12/20-25)   Heme: SCDs, Heparin gtt goal 60-80  Lines/drains: R PICC (12/21-) to be changed for new PICC ///dc: Left IJ TLC (12/3--), left IJ Juanjo (12/6-12/14).Feeding J-tube.  Wounds: Midline abdominal wound - wound vac (change T/TH/Sat). 56YOF s/p bypass revision following failed SIPS (anastamotic leak), found to have draining intra-abbdominal abscess, acute respiratory failure requiring tracheostomy, pneumonia, RUE DVT    Neuro: Dilaudid PRN, Tylenol PRN, Lexapro 20mg daily  CVS: MAP > 70, normotensive goals, lasix to achieve negative fluid balance   Pulm: Trach collar/CPAP 10/7, Peridex. Duonebs.  FEN/GI: NPO. J-tube TF to goal. Protonix. rectal tube (stg III ulcer)  : stone (stg III sacral ulcer)  Endo: ISS.   ID: MDR Pseudomonas (sputum and peritoneal fluid) - micafungin (12/31- ), colistin (12/31- ), meropenem (12/31- ), tobramycin (12/31- ) \\\ DC gent (12/26-) Imipenem (12/14--), Vanc (12/13-14), Zosyn (12/3-14), Linezolid (12/24-25),  fluconazole (12/4-25), - H. simplex Acyclovir (12/20-25). Will have IR review CT to assess ease of drainage of chronic collection.  Heme: SCDs, continue lovenox  Lines/drains: R PICC (12/21-) to be changed for new PICC ///dc: Left IJ TLC (12/3--), left IJ Juanjo (12/6-12/14).Feeding J-tube.  Wounds: Midline abdominal wound - wound vac (change T/TH/Sat).

## 2017-01-04 NOTE — PROGRESS NOTE ADULT - LYMPH NODES
No lymphadedenopathy

## 2017-01-04 NOTE — PROGRESS NOTE ADULT - EYES
EOMI; PERRL; no drainage or redness

## 2017-01-04 NOTE — PROGRESS NOTE ADULT - RESPIRATORY
detailed exam
Breath Sounds equal & clear to percussion & auscultation, no accessory muscle use

## 2017-01-04 NOTE — PROGRESS NOTE ADULT - PROBLEM SELECTOR PLAN 2
- stable Hb
- stable Hb  - check iron studies
- suspect dilutional but would give 1 unit PRBC now (will help keep BP elevated and help with renal perfusion)
the CT was removed with no radiological evidence of reaccumulation of the pleural fluid
the CT was removed with no radiological evidence of reaccumulation of the pleural fluid
- stable Hb  Functional iron deficiency is noted with mixed iron deficiency and anemia of inflammation.    In light of avoidance of infusing IV iron (in light of its sodium content), can start PO ferrous sulfate 325mg POQD     However, if an indication for pRBC transfusion arises, this would also replete iron stores. Consider pRBC transfusion per primary team
- stable Hb  Functional iron deficiency is noted with mixed iron deficiency and anemia of inflammation.    In light of avoidance of infusing IV iron (in light of its sodium content), can start PO ferrous sulfate 325mg POQD     However, if an indication for pRBC transfusion arises, this would also replete iron stores. Consider pRBC transfusion per primary team
The patient drain total of  600 mL from the pleural space. The parameters are consistent with extra data pleural effusion. The culture as negative to date. Continue chest tube drainage and evaluated in the morning. There's possibility that the chest tube can be removed tomorrow.
The patient drain total of  600 mL from the pleural space. The parameters are consistent with extra data pleural effusion. The culture as negative to date. Remove te CT
- stable Hb  Functional iron deficiency is noted with mixed iron deficiency and anemia of inflammation.    In light of avoidance of infusing IV iron (in light of its sodium content), can start PO ferrous sulfate 325mg POQD     However, if an indication for pRBC transfusion arises, this would also replete iron stores. Consider pRBC transfusion per primary team
- stable Hb at present   Functional iron deficiency is noted with mixed iron deficiency and anemia of inflammation.    In light of avoidance of infusing IV iron (in light of its sodium content), can start PO ferrous sulfate 325mg POQD
- stable Hb at present   Functional iron deficiency is noted with mixed iron deficiency and anemia of inflammation.    In light of avoidance of infusing IV iron (in light of its sodium content), can start PO ferrous sulfate 325mg POQD     However, if an indication for pRBC transfusion arises, this would also replete iron stores. Consider pRBC transfusion per primary team
continue antibiotics
for CT drainage and to send fuid for analysis

## 2017-01-04 NOTE — PROGRESS NOTE ADULT - EXTREMITIES
detailed exam
No cyanosis, clubbing or edema

## 2017-01-04 NOTE — PROGRESS NOTE ADULT - ATTENDING COMMENTS
Pt seen and examined  Case discussed at length with Dr. Johnson and earlier today with clinical pharmacy to assure appropriate antimicrobial therapy dose selection.  Remains with two principal sites of infection:  1) Lungs/Pneumonia with XDR Pseudomonas, still with secretions and mucus pluggs/lobe collapse  and  2) Intraabdominal collections.    Recommend aggressive chest PT with suctioning/therapeutic bronch? and attempt to aspirate/drain abdominal collections - this will also give us an idea as to the involved roland there.  For now:  Continue Colistin plus synergistic Meropenem  Nebulized tobra  Micafungin  IV tobramycin with extremely close monitoring of peak and troughs  Do not object to a trial of Cipro instead of IV AG top spare renal function after intraabdominal collection sampling/drainage, as discussed earlier with Dr. Johnson, given that recent CHECO drain drainage with Cipro sensitive Pseudomonas

## 2017-01-04 NOTE — PROGRESS NOTE ADULT - ASSESSMENT
56 year old critical patient s/p SIPs procedure with free air concerning for anastomotic leak s/p emergent ex lap with abdominal washout found to have leak of esophagojejunostomy which they could not repair due to its location.  She is s/p esophageal stent placement, peritoneal washout and internal double pigtail stent placement. During endoscopy second defect noted on enteric side of anastomosis. Repeat endoscopy removed stent and placed a second stent over entire 5 cm anastomotic defect. 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 She has had repeated bronchoscopies with 12/13 and 12/24 bronchial wash growing MDR pseudomonas. She had a pigtail placed, roughly 600cc fluid removed and chest tube removed. She had a repeat CT scan which showed persistent fluid collections (abdominal abscesses) and collapsed lung b/l. We are concerned of two sites of infection - lung (XDR Pseudomonas) and intrabdominal collections (polymicrobial- Pseudomonas, yeast, strep milleri in previous cultures). In light of her bacteriology and resistance patterns after calling and speaking with microbiology lab as well as with Dr. Bains (earlier in hospital course) who was previously on the case (ID service) we recommend:    -Drain or aspirate fluid collection in abdomen for gram stain, culture   -Treat yeast with micafungin   -Treat XDR pseudomonas with colistin (low dose) and synergistic effect with meropenem (which also has GNR +strep milleri coverage) and additional IV aminoglycoside/Tobramycin (for systemic infection) as well as nebulized aminoglycoside Tobramycin for lung infection   -This was discussed with the primary team, please call with questions 56 year old critical patient s/p SIPs procedure with free air concerning for anastomotic leak s/p emergent ex lap with abdominal washout found to have leak of esophagojejunostomy which they could not repair due to its location.  She is s/p esophageal stent placement, peritoneal washout and internal double pigtail stent placement. During endoscopy second defect noted on enteric side of anastomosis. Repeat endoscopy removed stent and placed a second stent over entire 5 cm anastomotic defect. 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 She has had repeated bronchoscopies with 12/13 and 12/24 bronchial wash growing MDR pseudomonas. She had a pigtail placed, roughly 600cc fluid removed and chest tube removed. She had a repeat CT scan which showed persistent fluid collections (abdominal abscesses) and collapsed lung b/l. We are concerned of two sites of infection - lung (XDR Pseudomonas) and intrabdominal collections (polymicrobial- Pseudomonas, yeast, strep milleri in previous cultures). In light of her bacteriology and resistance patterns after calling and speaking with microbiology lab as well as with Dr. Bains (earlier in hospital course) who was previously on the case (ID service) we recommend:    -Drain or aspirate fluid collection in abdomen for gram stain, culture   -check Cdiff EIA   -Treat yeast with micafungin   -Treat XDR pseudomonas with colistin (low dose) and synergistic effect with meropenem (which also has GNR +strep milleri coverage) and additional IV aminoglycoside/Tobramycin (for systemic infection) as well as nebulized aminoglycoside Tobramycin for lung infection and as discussed with Dr. Johnson after possible aspiration, add Cipro BID   -This was discussed with the attending Dr. Johnson, please call with questions

## 2017-01-05 NOTE — PROGRESS NOTE ADULT - ASSESSMENT
Acute respiratory failure s/p repair/stent of anastamotic leak of [place]-en-Y with pseudomonas pna and intraabdominal abscess, RUE DVT.    Resp - continue TC 40%  CV- will hold off on further diuresis for now with slight increase in BUN.  ID- discussed at length with Dr. Koo. Decision to try to limit nephrotoxic IV tobra/colistin as more info comes in from planned drainage of remaining intraabdominal collection tomorrow. Continue meropenem while on colistin, and empiric micafungin. Cipro may substitute for one of these especially if cipro-sensitive pseudomonas is isolated from the collection.  Vascular - continue lovenox except will hold for tomorrow's drainage  GI/FEN- continues on osmolite feeds  Wounds/skin-continue VAC therapy  renal- continue to follow renal indices on tobra and colistin.

## 2017-01-05 NOTE — ADVANCED PRACTICE NURSE CONSULT - REASON FOR CONSULT
WOC nurse consult to re=assess pressure ulcers. Unable to assess pressure ulcers. Patient seated in chair. Will follow up on 1/6/17.

## 2017-01-05 NOTE — PROGRESS NOTE ADULT - SUBJECTIVE AND OBJECTIVE BOX
S: Pt reports no complaints this morning, she slept well. She had a fever of 101.8 overnight. She has no CP, no SOB this morning.     Vitals: Vital Signs Last 24 Hrs  T(C): 37.1, Max: 38.8 (01-04 @ 22:12)  T(F): 98.8, Max: 101.8 (01-04 @ 22:12)  HR: 100 (98 - 120)  BP: 122/66 (92/56 - 171/83)  BP(mean): 83 (69 - 106)  RR: 23 (18 - 34)  SpO2: 94% (91% - 100%)    CAPILLARY BLOOD GLUCOSE  85 (04 Jan 2017 22:00)  83 (04 Jan 2017 17:00)  114 (04 Jan 2017 12:00)      I & Os for 24h ending 01-05 @ 07:00  =============================================  IN: 1771.9 ml / OUT: 2720 ml / NET: -948.1 ml    I & Os for current day (as of 01-05 @ 08:50)  =============================================  IN: 53 ml / OUT: 140 ml / NET: -87 ml          Labs:                         9.0    13.0  )-----------( 291      ( 05 Jan 2017 06:47 )             29.7   05 Jan 2017 06:46    136    |  98     |  20     ----------------------------<  94     5.1     |  30     |  0.52     Ca    11.0       05 Jan 2017 06:46  Phos  2.9       05 Jan 2017 06:46  Mg     2.2       05 Jan 2017 06:46    PT/INR - ( 05 Jan 2017 06:47 )   PT: 14.4 sec;   INR: 1.29          PTT - ( 05 Jan 2017 06:47 )  PTT:35.8 sec    Electrolytes repleated to goals as follows: Potassium 4, Phosphorus 3 and Magnesium 2 where appropriate and necessary    Exam:  Neuro: A&Ox3, NAD, grossly neuro intact  CV: RRR, no MRGs  Pulm: bilateral rhonchi, worst at apices equal bilaterally, decreased b/l lower field breath sounds  Abd: BS hypoactive, Soft, obese, NT, central wound vac in place with sunction intact, no surrounding ecchymosis, port sites without visible drainage.   : stone and rectal tube in place, no rectal ttp or in surrounding area.   Skin: Stage 3 sacral decub clean and dressed.   Ext: Global edema 1+, 2+ in b/l LEs  Vasc: Extremities warm to palpation, 2+ pulses - radial and PT S: Pt reports no complaints this morning, she slept well. She had a fever of 101.8 overnight. She has no CP, no SOB, no abdominal pain or chills this morning.     Vitals: Vital Signs Last 24 Hrs  T(C): 37.1, Max: 38.8 (01-04 @ 22:12)  T(F): 98.8, Max: 101.8 (01-04 @ 22:12)  HR: 100 (98 - 120)  BP: 122/66 (92/56 - 171/83)  BP(mean): 83 (69 - 106)  RR: 23 (18 - 34)  SpO2: 94% (91% - 100%)    CAPILLARY BLOOD GLUCOSE  85 (04 Jan 2017 22:00)  83 (04 Jan 2017 17:00)  114 (04 Jan 2017 12:00)      I & Os for 24h ending 01-05 @ 07:00  =============================================  IN: 1771.9 ml / OUT: 2720 ml / NET: -948.1 ml    I & Os for current day (as of 01-05 @ 08:50)  =============================================  IN: 53 ml / OUT: 140 ml / NET: -87 ml          Labs:                         9.0    13.0  )-----------( 291      ( 05 Jan 2017 06:47 )             29.7   05 Jan 2017 06:46    136    |  98     |  20     ----------------------------<  94     5.1     |  30     |  0.52     Ca    11.0       05 Jan 2017 06:46  Phos  2.9       05 Jan 2017 06:46  Mg     2.2       05 Jan 2017 06:46    PT/INR - ( 05 Jan 2017 06:47 )   PT: 14.4 sec;   INR: 1.29          PTT - ( 05 Jan 2017 06:47 )  PTT:35.8 sec    Electrolytes repleted to goals as follows: Potassium 4, Phosphorus 3 and Magnesium 2 where appropriate and necessary    Exam:  Neuro: A&Ox3, NAD, grossly neuro intact  CV: RRR, no MRGs  Pulm: bilateral rhonchi, worst at apices equal bilaterally, decreased b/l lower field breath sounds  Abd: BS hypoactive, Soft, obese, NT, central wound vac in place with sunction intact, no surrounding ecchymosis, port sites without visible drainage.   : stone and rectal tube in place, no rectal ttp or in surrounding area.   Skin: Stage 3 sacral decub clean and dressed.   Ext: Global edema 1+, 2+ in b/l LEs  Vasc: Extremities warm to palpation, 2+ pulses - radial and PT  MSK no clubbing/cyanosis

## 2017-01-06 NOTE — PROGRESS NOTE ADULT - ASSESSMENT
56YOF s/p bypass revision following failed SIPS (anastamotic leak), found to have draining intra-abbdominal abscess, multiple bronchs for mucous plugging    Neuro: Dilaudid PRN, Tylenol PRN, Lexapro 20mg daily  CVS: MAP > 70, normotensive goals    Pulm: Trach collar/CPAP 10/7, Peridex. Duonebs. Mucomyst.  FEN/GI: J-tube TF 50cc/hr osmolite 1.2. Protonix. Zofran. Vit C syrup.  Zinc sulfate.  Multivitamin.  Rectal tube (stg III ulcer)  : stone (stg III sacral ulcer)  Endo: ISS.   ID: MDR Pseudomonas (sputum and peritoneal fluid) - micafungin (12/31- ), colistin (12/31- ), meropenem (12/31- ), tobramycin (12/31- ) \\\ DC gent (12/26-) Imipenem (12/14--), Vanc (12/13-14), Zosyn (12/3-14), Linezolid (12/24-25),  fluconazole (12/4-25), - H. simplex Acyclovir (12/20-25)   Heme: SCDs, SQL   Lines/drains: Abd drain to be placed by IR (1/6-), R PICC (12/21-) to be changed for new PICC ///dc: Left IJ TLC (12/3--), left IJ Juanjo (12/6-12/14).Feeding J-tube.  Wounds: Midline abdominal wound - wound vac (change T/TH/Sat).  Benadryl PRN for itching. 56YOF s/p bypass revision following failed SIPS (anastamotic leak), found to have draining intra-abbdominal abscess, with resolving respiratory failure, pseudomonas pna. ATN    Neuro: Dilaudid PRN, Tylenol PRN, Lexapro 20mg daily  CVS: MAP > 70, normotensive goals    Pulm: Trach collar/CPAP 10/7, Peridex. Duonebs. Mucomyst.  FEN/GI: J-tube TF 50cc/hr osmolite 1.2. Protonix. Zofran. Vit C syrup.  Zinc sulfate.  Multivitamin.  Rectal tube (stg III ulcer)  : stone (stg III sacral ulcer)  renal: BUN creatinine still rising likely from nephrotoxic abx and contrast. Hope will start to reverse off tobramycin IV.   Endo: ISS.   ID: S/P drainage of intraabdominal collection by IR. Await culture. MDR Pseudomonas (sputum and peritoneal fluid) - micafungin (12/31- ), colistin (12/31- ), meropenem (12/31- ), tobramycin (12/31- ) \\\ DC gent (12/26-) Imipenem (12/14--), Vanc (12/13-14), Zosyn (12/3-14), Linezolid (12/24-25),  fluconazole (12/4-25), - H. simplex Acyclovir (12/20-25). For now continue meropenem/colistin and inhaled tobra. No further IV tobra unless looks septic given ATN.  Heme: SCDs, SQL   Lines/drains: Abd drain to be placed by IR (1/6-), R PICC (12/21-) to be changed for new PICC ///dc: Left IJ TLC (12/3--), left IJ Juanjo (12/6-12/14).Feeding J-tube.  Wounds: Midline abdominal wound - wound vac (change T/TH/Sat).  Benadryl PRN for itching.

## 2017-01-06 NOTE — ADVANCED PRACTICE NURSE CONSULT - ASSESSMENT
Sacral DTI extending to bilateral buttocks measuring 14 cm x 11 cm which evolved to unstageable pressure ulcers on right buttocks, the largest measuring 4 cm x 4 cm with small amount of serosanguinous exudate.   Right lower buttocks stage 2 pressure ulcer measuring 1 cm x 1.5 cm x 0.1 cm with scant serous exudate.  Right hip DTI measuring 4 cm x 5 cm.   Patient on Air TAP positioning device with wedges, heel protectors on for offloading.   Rectal tube in place draining brown liquid stool.

## 2017-01-06 NOTE — ADVANCED PRACTICE NURSE CONSULT - RECOMMEDATIONS
Cleanse wounds with wounds cleanser.  Sacral/left buttock, right lower buttock stage 2, right hip DTI - apply foam dressing every other day and prn if soiled or wet.   Right buttocks unstageable pressure ulcers (injuries) - apply Collagenase and cover with foam dressing daily. Cleanse wounds with wounds cleanser.  Sacral/left buttock, right lower buttock stage 2, right hip DTI - apply foam dressing every other day and prn if soiled or wet.   Right buttocks unstageable pressure ulcers (injuries) - apply Collagenase and cover with foam dressing daily.   Discussed assessment and recommendations with Morenita RAZO.

## 2017-01-06 NOTE — CHART NOTE - NSCHARTNOTEFT_GEN_A_CORE
Interventional Radiology Procedure Note:    The risks, benefits, and alternatives to a CT guided abscess drainage were discussed with the patient and informed consent was obtained. The patient was placed on the CT gantry and prepped and draped in sterile fashion in the right lateral decubitus position. An 18 gauge needle was inserted into the abdomen under CT guidance and directed towards a large fluid collection in the mid abdomen. A 10 Fr drainage catheter was placed within the collection and sutured to the skin. Approximately 80 cc of purulent fluid were aspirated from the collection. A post-procedure CT scan showed no immediate complications. Aspirated material was sent to microbiology. Blood loss, 2 cc.

## 2017-01-06 NOTE — PROGRESS NOTE ADULT - SUBJECTIVE AND OBJECTIVE BOX
S: Pt reports no complaints, no CP, SOB, N/V, no pain this morning    Vitals: Vital Signs Last 24 Hrs  T(C): 37.3, Max: 37.8 (01-05 @ 22:12)  T(F): 99.1, Max: 100 (01-05 @ 22:12)  HR: 115 (93 - 116)  BP: 148/85 (95/61 - 150/83)  BP(mean): 112 (70 - 112)  RR: 30 (23 - 40)  SpO2: 95% (91% - 100%)    CAPILLARY BLOOD GLUCOSE  95 (06 Jan 2017 06:00)  101 (05 Jan 2017 11:00)        I & Os for current day (as of 01-06 @ 08:26)  =============================================  IN: 995 ml / OUT: 1140 ml / NET: -145 ml          Labs:                         8.6    11.7  )-----------( 263      ( 06 Jan 2017 04:19 )             27.8   06 Jan 2017 04:19    140    |  101    |  26     ----------------------------<  93     4.8     |  30     |  0.65     Ca    10.6       06 Jan 2017 04:19  Phos  3.0       06 Jan 2017 04:19  Mg     2.1       06 Jan 2017 04:19    PT/INR - ( 05 Jan 2017 06:47 )   PT: 14.4 sec;   INR: 1.29          PTT - ( 05 Jan 2017 06:47 )  PTT:35.8 sec    Electrolytes repleated to goals as follows: Potassium 4, Phosphorus 3 and Magnesium 2 where appropriate and necessary    Exam:  Neuro: A&Ox3, NAD, grossly neuro intact  CV: RRR, no MRGs  Pulm: Clear bilaterally, decreased b/l lower field breath sounds  Abd: BS hypoactive, Soft, obese, NT, central wound vac in place with sunction intact, no surrounding ecchymosis, port sites without visible drainage.   : stone and rectal tube in place, no rectal ttp or in surrounding area.   Skin: Stage 3 sacral decub clean and dressed.   Ext: Global edema 1+, 2+ in b/l LEs  Vasc: Extremities warm to palpation, 2+ pulses - radial and PT  MSK no clubbing/cyanosis

## 2017-01-07 NOTE — PROGRESS NOTE ADULT - SUBJECTIVE AND OBJECTIVE BOX
INTERVAL HPI/OVERNIGHT EVENTS:  Reviewed and discussed with Dr. Johnson last night  Cdiff treatment started plus expansion of abx coverage to include MDR GPCs  S/P IV Tobra last night    MEDICATIONS  (STANDING):  pantoprazole  Injectable 40milliGRAM(s) IV Push daily  insulin lispro (HumaLOG) corrective regimen sliding scale  SubCutaneous Before meals and at bedtime  heparin  flush 100 Units/mL Injectable 100Unit(s) IV Push every other day  ALBUTerol/ipratropium for Nebulization 3milliLiter(s) Nebulizer every 6 hours  sodium chloride 0.9% lock flush 20milliLiter(s) IV Push once  ascorbic acid Syrup 500milliGRAM(s) Oral daily  zinc sulfate 220milliGRAM(s) Oral daily  tobramycin for Nebulization 300milliGRAM(s) Inhalation every 12 hours  colistimethate IVPB 100milliGRAM(s) IV Intermittent every 12 hours  micafungin IVPB  IV Intermittent   micafungin IVPB 100milliGRAM(s) IV Intermittent every 24 hours  multivitamin 1Tablet(s) Oral daily  meropenem IVPB 2000milliGRAM(s) IV Intermittent every 8 hours  cyanocobalamin Injectable 1000MICROGram(s) IntraMuscular every 7 days  cholecalciferol 2000Unit(s) Oral daily  collagenase Ointment 1Application(s) Topical daily  enoxaparin Injectable 80milliGRAM(s) SubCutaneous <User Schedule>  linezolid  IVPB  IV Intermittent   linezolid  IVPB 600milliGRAM(s) IV Intermittent every 12 hours  vancomycin    Solution 250milliGRAM(s) Oral every 6 hours    MEDICATIONS  (PRN):  acetaminophen    Suspension. 650milliGRAM(s) Oral every 6 hours PRN Mild Pain (1 - 3)  sodium chloride 0.9% lock flush 10milliLiter(s) IV Push every 1 hour PRN After each medication administration  sodium chloride 0.9% lock flush 10milliLiter(s) IV Push every 12 hours PRN Lumen of catheter NOT used  acetaminophen    Suspension 650milliGRAM(s) Enteral Tube every 6 hours PRN For Temp greater than 38 C (100.4 F)  ondansetron Injectable 4milliGRAM(s) IV Push every 6 hours PRN Nausea and/or Vomiting  HYDROmorphone  Injectable 1milliGRAM(s) IV Push every 4 hours PRN Severe Pain (7 - 10)  HYDROmorphone  Injectable 0.5milliGRAM(s) IV Push every 4 hours PRN Moderate Pain (4 - 6)  diphenhydrAMINE   Injectable 25milliGRAM(s) IntraMuscular every 4 hours PRN Rash and/or Itching      Allergies  sulfa drugs (Unknown)  sulfamethoxazole (Other)    EXAM    Vital Signs Last 24 Hrs  T(C): 36.5, Max: 38.4 (01-07 @ 01:57)  T(F): 97.7, Max: 101.1 (01-07 @ 01:57)  HR: 102 (90 - 137)  BP: 101/59 (98/58 - 140/73)  BP(mean): 77 (71 - 101)  RR: 16 (12 - 37)  SpO2: 97% (93% - 100%)  On trach collar now   Secretions still thick  No rash  Awakens and responding appropriately  Borderline tachy  Chest with BL rhonchi  Abd soft, NT; now with CHECO and fedding jejunostomy  RT with diarrhea  Yu with urine    LABS:                        7.0    9.2   )-----------( 229      ( 07 Jan 2017 06:47 )             22.3     07 Jan 2017 06:47    142    |  105    |  28     ----------------------------<  127    3.8     |  26     |  0.56     Ca    9.6        07 Jan 2017 06:47  Phos  2.6       07 Jan 2017 06:47  Mg     1.9       07 Jan 2017 06:47      MICROBIOLOGY:  Culture - Blood (01.06.17 @ 19:50)    Specimen Source: .Blood Blood-Peripheral    Culture Results:   No growth at 12 hours    Culture - Blood (01.06.17 @ 19:50)    Specimen Source: .Blood Blood-Peripheral    Culture Results:   No growth at 12 hours    Culture - Blood (01.03.17 @ 08:50)    Specimen Source: .Blood Blood    Culture Results:   No growth at 4 days.    Culture - Blood (01.03.17 @ 08:50)    Specimen Source: .Blood Blood    Culture Results:   No growth at 4 days.

## 2017-01-07 NOTE — PROGRESS NOTE ADULT - ASSESSMENT
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 Duonebs. Mucomyst.  FEN/GI: J-tube TF 50cc/hr osmolite 1.2. Protonix. Zofran. Vit C syrup.  Zinc sulfate.  Multivitamin.  Rectal tube (stg III ulcer)  : stone (stg III sacral ulcer)  Endo: ISS.   ID: C. diff: Vancomycin (1/7) MDR Pseudomonas (sputum and peritoneal fluid) - linezolid (1/7) micafungin (12/31- ), colistin (12/31- ), meropenem (12/31- ), tobramycin (12/31- ) \\\ DC gent (12/26-) Imipenem (12/14--), Vanc (12/13-14), Zosyn (12/3-14), Linezolid (12/24-25),  fluconazole (12/4-25), - H. simplex Acyclovir (12/20-25)   Heme: SCDs, SQL   Lines/drains: R PICC (12/21-) to be changed for new PICC ///dc: Left IJ TLC (12/3--), left IJ Juanjo (12/6-12/14).Feeding J-tube.  Wounds: Midline abdominal wound - wound vac (change T/TH/Sat).  Benadryl PRN for itching. 56YOF s/p bypass revision following failed SIPS (anastamotic leak), found to have draining intra-abbdominal abscess, acute respiratory failure, pna, C. difficile, ATN    Neuro: Dilaudid PRN, Tylenol PRN, Lexapro 20mg daily  CVS: MAP > 70, normotensive goals    Pulm: Trach collar/CPAP 10/7/Trach collar as tolerated, Peridex. Duonebs.   FEN/GI: J-tube TF 50cc/hr osmolite 1.2. Protonix. Zofran. Vit C syrup.  Zinc sulfate.  Multivitamin.  Rectal tube (stg III ulcer)  : stone (stg III sacral ulcer)  Endo: ISS.   ID: C. diff: Vancomycin (1/7) MDR Pseudomonas (sputum and peritoneal fluid) - linezolid (1/7) micafungin (12/31- ), colistin (12/31- ), meropenem (12/31- ), tobramycin (12/31- ) \\\ DC gent (12/26-) Imipenem (12/14--), Vanc (12/13-14), Zosyn (12/3-14), Linezolid (12/24-25),  fluconazole (12/4-25), - H. simplex Acyclovir (12/20-25)   Heme: SCDs, SQL   Lines/drains: R PICC (12/21-) to be changed for new PICC ///dc: Left IJ TLC (12/3--), left IJ Juanjo (12/6-12/14).Feeding J-tube.  Wounds: Midline abdominal wound - wound vac (change T/TH/Sat).  Benadryl PRN for itching. 56YOF s/p bypass revision following failed SIPS (anastamotic leak), found to have draining intra-abbdominal abscess, acute respiratory failure, pna, C. difficile, ATN, DVT    Neuro: Dilaudid PRN, Tylenol PRN, Lexapro 20mg daily  CVS: MAP > 70, normotensive goals    Pulm: Trach collar/CPAP 10/7/Trach collar as tolerated, Peridex. Duonebs.   FEN/GI: J-tube TF 50cc/hr osmolite 1.2. Protonix. Zofran. Vit C syrup.  Zinc sulfate.  Multivitamin.  Rectal tube (stg III ulcer)  : stone (stg III sacral ulcer)  Endo: ISS.   ID: C. diff: Vancomycin (1/7) MDR Pseudomonas (sputum and peritoneal fluid) - linezolid (1/7) micafungin (12/31- ), colistin (12/31- ), meropenem (12/31- ), tobramycin (12/31- ) \\\ DC gent (12/26-) Imipenem (12/14--), Vanc (12/13-14), Zosyn (12/3-14), Linezolid (12/24-25),  fluconazole (12/4-25), - H. simplex Acyclovir (12/20-25)   Heme: SCDs, SQLovenox  Renal fxn stable today   Lines/drains: R PICC (12/21-) to be changed for new PICC ///dc: Left IJ TLC (12/3--), left IJ Juanjo (12/6-12/14).Feeding J-tube.  Wounds: Midline abdominal wound - wound vac (change T/TH/Sat).  Benadryl PRN for itching.

## 2017-01-07 NOTE — PROGRESS NOTE ADULT - ASSESSMENT
S/P bariatric surgery  Anastomotic leaks - with stents now  Intraabdominal collection; s/p drainage of purulent collection; culture pending  Respiratory failure  XDR Pseudomonas pneumonia  Cdiff colitis  Fever    RECOMMEND  Continue Colistin with synergistic Meropenem  Continue neb tobra  Continue Micafungin for now  Continue Linezolid for now  Continue enteral Vanco  Tobra trough pending tonight  Chest PT/Pulmonary toilet    Hope for complete drainage of intraabdominal collection which is also critical for the success of antimicrobial therapy

## 2017-01-07 NOTE — PROGRESS NOTE ADULT - SUBJECTIVE AND OBJECTIVE BOX
AISSATOU DAVIS  [id number]  56y  Female  sulfa drugs (Unknown)  sulfamethoxazole (Other)    Overnight developed increasing tachypnea and tachycardia so was placed on trach darrius assist control. Started on Zyvox and C. diff returned positive - started on Vancomycin via J-tube.      T(C): 36.5, Max: 38.4 (01-07 @ 01:57)  HR: 90 (90 - 137)  BP: 110/69 (98/58 - 140/73)  RR: 16 (12 - 37)  SpO2: 93% (93% - 100%)  Wt(kg): --    I & Os for 24h ending 01-07 @ 07:00  =============================================  IN: 4711.5 ml / OUT: 1709 ml / NET: 3002.5 ml    I & Os for current day (as of 01-07 @ 15:44)  =============================================  IN: 804 ml / OUT: 310 ml / NET: 494 ml    PE:  Gen: NAD  CV: RRR +S1,s2  Resp: CTAB  Abd: midline wound vac dressing in place, no air leak, good seal. Left sided drain in place putting out purulent material. Abd soft, no tenderness, no rebound or rigidity  Ext: no edema  Vasc: Radial pulses intact bilaterally                        7.0    9.2   )-----------( 229      ( 07 Jan 2017 06:47 )             22.3       07 Jan 2017 06:47    142    |  105    |  28     ----------------------------<  127    3.8     |  26     |  0.56     Ca    9.6        07 Jan 2017 06:47  Phos  2.6       07 Jan 2017 06:47  Mg     1.9       07 Jan 2017 06:47            pantoprazole  Injectable 40milliGRAM(s) IV Push daily  insulin lispro (HumaLOG) corrective regimen sliding scale  SubCutaneous Before meals and at bedtime  heparin  flush 100 Units/mL Injectable 100Unit(s) IV Push every other day  ALBUTerol/ipratropium for Nebulization 3milliLiter(s) Nebulizer every 6 hours  acetaminophen    Suspension. 650milliGRAM(s) Oral every 6 hours PRN  sodium chloride 0.9% lock flush 10milliLiter(s) IV Push every 1 hour PRN  sodium chloride 0.9% lock flush 10milliLiter(s) IV Push every 12 hours PRN  sodium chloride 0.9% lock flush 20milliLiter(s) IV Push once  acetaminophen    Suspension 650milliGRAM(s) Enteral Tube every 6 hours PRN  ondansetron Injectable 4milliGRAM(s) IV Push every 6 hours PRN  ascorbic acid Syrup 500milliGRAM(s) Oral daily  zinc sulfate 220milliGRAM(s) Oral daily  tobramycin for Nebulization 300milliGRAM(s) Inhalation every 12 hours  colistimethate IVPB 100milliGRAM(s) IV Intermittent every 12 hours  micafungin IVPB  IV Intermittent   micafungin IVPB 100milliGRAM(s) IV Intermittent every 24 hours  HYDROmorphone  Injectable 1milliGRAM(s) IV Push every 4 hours PRN  HYDROmorphone  Injectable 0.5milliGRAM(s) IV Push every 4 hours PRN  diphenhydrAMINE   Injectable 25milliGRAM(s) IntraMuscular every 4 hours PRN  multivitamin 1Tablet(s) Oral daily  meropenem IVPB 2000milliGRAM(s) IV Intermittent every 8 hours  cyanocobalamin Injectable 1000MICROGram(s) IntraMuscular every 7 days  cholecalciferol 2000Unit(s) Oral daily  collagenase Ointment 1Application(s) Topical daily  enoxaparin Injectable 80milliGRAM(s) SubCutaneous <User Schedule>  linezolid  IVPB  IV Intermittent   linezolid  IVPB 600milliGRAM(s) IV Intermittent every 12 hours  vancomycin    Solution 250milliGRAM(s) Oral every 6 hours      GASTRIC FISTULA/ ESOPHAGSTRICTURE  GASTRIC FISTULA/ ESOPHAGESTRICTURE  GASTRIC FISTULA/ ESOPHAGEALSTRICTURE  No h/o HF  No pertinent family history in first degree relatives  Handoff  MEWS Score  Reflux  Obesity  DVT (deep venous thrombosis)  Depression  Diverticulitis  Hypertension  Mucus plugging of bronchi  Respiratory failure  Enteroenteric fistula  Mucus plugging of bronchi  Respiratory failure  Enteroenteric fistula  Obesity  Pneumonia, bacterial  Pleural effusion  Hypertension  DVT (deep venous thrombosis)  Anastomotic leak of intestine  Hypophosphatemia  Hypernatremia  Hypokalemia  Anemia  ASA (acute kidney injury)  Obesity  Tracheostomy, percutaneous  Bronchoscopy  Gastric bypass status for obesity  S/P breast biopsy  S/P colon resection  S/P knee surgery  Umbilical hernia  S/P appendectomy  S/P tonsillectomy AISSATOU DAVIS  [id number]  56y  Female  sulfa drugs (Unknown)  sulfamethoxazole (Other)    Overnight developed increasing tachypnea and tachycardia so was placed on trach darrius assist control. Started on Zyvox and C. diff returned positive - started on Vancomycin via J-tube.  ROS No HA, SOB, CP, N/V      T(C): 36.5, Max: 38.4 (01-07 @ 01:57)  HR: 90 (90 - 137)  BP: 110/69 (98/58 - 140/73)  RR: 16 (12 - 37)  SpO2: 93% (93% - 100%)  Wt(kg): --    I & Os for 24h ending 01-07 @ 07:00  =============================================  IN: 4711.5 ml / OUT: 1709 ml / NET: 3002.5 ml    I & Os for current day (as of 01-07 @ 15:44)  =============================================  IN: 804 ml / OUT: 310 ml / NET: 494 ml    PE:  Gen: NAD  Skin: no rash  HEENT: PERRL, EOMI, tongue moist, throat clear  CV: RRR +S1,s2  Resp: CTAB  Abd: midline wound vac dressing in place, no air leak, good seal. Left sided drain in place putting out purulent material. Abd soft, no tenderness, no rebound or rigidity  Ext: no edema  Vasc: Radial pulses intact bilaterally  MSK no clubbing, cyanosis, joint swelling                        7.0    9.2   )-----------( 229      ( 07 Jan 2017 06:47 )             22.3       07 Jan 2017 06:47    142    |  105    |  28     ----------------------------<  127    3.8     |  26     |  0.56     Ca    9.6        07 Jan 2017 06:47  Phos  2.6       07 Jan 2017 06:47  Mg     1.9       07 Jan 2017 06:47            pantoprazole  Injectable 40milliGRAM(s) IV Push daily  insulin lispro (HumaLOG) corrective regimen sliding scale  SubCutaneous Before meals and at bedtime  heparin  flush 100 Units/mL Injectable 100Unit(s) IV Push every other day  ALBUTerol/ipratropium for Nebulization 3milliLiter(s) Nebulizer every 6 hours  acetaminophen    Suspension. 650milliGRAM(s) Oral every 6 hours PRN  sodium chloride 0.9% lock flush 10milliLiter(s) IV Push every 1 hour PRN  sodium chloride 0.9% lock flush 10milliLiter(s) IV Push every 12 hours PRN  sodium chloride 0.9% lock flush 20milliLiter(s) IV Push once  acetaminophen    Suspension 650milliGRAM(s) Enteral Tube every 6 hours PRN  ondansetron Injectable 4milliGRAM(s) IV Push every 6 hours PRN  ascorbic acid Syrup 500milliGRAM(s) Oral daily  zinc sulfate 220milliGRAM(s) Oral daily  tobramycin for Nebulization 300milliGRAM(s) Inhalation every 12 hours  colistimethate IVPB 100milliGRAM(s) IV Intermittent every 12 hours  micafungin IVPB  IV Intermittent   micafungin IVPB 100milliGRAM(s) IV Intermittent every 24 hours  HYDROmorphone  Injectable 1milliGRAM(s) IV Push every 4 hours PRN  HYDROmorphone  Injectable 0.5milliGRAM(s) IV Push every 4 hours PRN  diphenhydrAMINE   Injectable 25milliGRAM(s) IntraMuscular every 4 hours PRN  multivitamin 1Tablet(s) Oral daily  meropenem IVPB 2000milliGRAM(s) IV Intermittent every 8 hours  cyanocobalamin Injectable 1000MICROGram(s) IntraMuscular every 7 days  cholecalciferol 2000Unit(s) Oral daily  collagenase Ointment 1Application(s) Topical daily  enoxaparin Injectable 80milliGRAM(s) SubCutaneous <User Schedule>  linezolid  IVPB  IV Intermittent   linezolid  IVPB 600milliGRAM(s) IV Intermittent every 12 hours  vancomycin    Solution 250milliGRAM(s) Oral every 6 hours      GASTRIC FISTULA/ ESOPHAGSTRICTURE  GASTRIC FISTULA/ ESOPHAGESTRICTURE  GASTRIC FISTULA/ ESOPHAGEALSTRICTURE  No h/o HF  No pertinent family history in first degree relatives  Handoff  MEWS Score  Reflux  Obesity  DVT (deep venous thrombosis)  Depression  Diverticulitis  Hypertension  Mucus plugging of bronchi  Respiratory failure  Enteroenteric fistula  Mucus plugging of bronchi  Respiratory failure  Enteroenteric fistula  Obesity  Pneumonia, bacterial  Pleural effusion  Hypertension  DVT (deep venous thrombosis)  Anastomotic leak of intestine  Hypophosphatemia  Hypernatremia  Hypokalemia  Anemia  ASA (acute kidney injury)  Obesity  Tracheostomy, percutaneous  Bronchoscopy  Gastric bypass status for obesity  S/P breast biopsy  S/P colon resection  S/P knee surgery  Umbilical hernia  S/P appendectomy  S/P tonsillectomy

## 2017-01-08 NOTE — PROGRESS NOTE ADULT - SUBJECTIVE AND OBJECTIVE BOX
Interval Events: Tolerated trach collar yesterday and overnight. no other complaints  Patient seen and examined at bedside.      Allergies    sulfa drugs (Unknown)  sulfamethoxazole (Other)    Intolerances        Vital Signs Last 24 Hrs  T(C): 36.9, Max: 38.3 (01-07 @ 22:17)  T(F): 98.5, Max: 101 (01-07 @ 22:17)  HR: 101 (90 - 115)  BP: 140/82 (101/59 - 169/81)  BP(mean): 97 (73 - 113)  RR: 14 (14 - 32)  SpO2: 97% (93% - 99%)  I & Os for 24h ending 01-08 @ 07:00  =============================================  IN: 2322 ml / OUT: 1280 ml / NET: 1042 ml    I & Os for current day (as of 01-08 @ 13:57)  =============================================  IN: 325.6 ml / OUT: 185 ml / NET: 140.6 ml  I & Os for 24h ending 01-08 @ 07:00  =============================================  IN: 2322 ml / OUT: 1280 ml / NET: 1042 ml    I & Os for current day (as of 01-08 @ 13:57)  =============================================  IN: 325.6 ml / OUT: 185 ml / NET: 140.6 ml        Physical Exam:     Neuro: A&OX3 No deficits, lying comfortably in bed  Pulm: distant but equal lung sounds  Abd: Soft NT nd, midline incision with vac CDI, Left IR drain CDI, J-tube in place  Ext: improved edema, warm extremities      LABS:      CBC Full  -  ( 08 Jan 2017 06:32 )  WBC Count : 8.1 K/uL  Hemoglobin : 7.2 g/dL  Hematocrit : 24.2 %  Platelet Count - Automated : 239 K/uL  Mean Cell Volume : 85.5 fL  Mean Cell Hemoglobin : 25.4 pg  Mean Cell Hemoglobin Concentration : 29.8 g/dL  Auto Neutrophil # : x  Auto Lymphocyte # : x  Auto Monocyte # : x  Auto Eosinophil # : x  Auto Basophil # : x  Auto Neutrophil % : x  Auto Lymphocyte % : x  Auto Monocyte % : x  Auto Eosinophil % : x  Auto Basophil % : x    08 Jan 2017 06:32    141    |  105    |  22     ----------------------------<  101    3.9     |  25     |  0.45     Ca    9.9        08 Jan 2017 06:32  Phos  2.1       08 Jan 2017 06:32  Mg     1.6       08 Jan 2017 06:32                      RADIOLOGY & ADDITIONAL STUDIES (The following images were personally reviewed):          A/p: 56yFemale Interval Events: Tolerated trach collar yesterday and overnight. no other complaints  Patient seen and examined at bedside.  ROS no HA, dizzyness, N/V, CP, SOB, abd pain, dysuria    Allergies    sulfa drugs (Unknown)  sulfamethoxazole (Other)    Intolerances        Vital Signs Last 24 Hrs  T(C): 36.9, Max: 38.3 (01-07 @ 22:17)  T(F): 98.5, Max: 101 (01-07 @ 22:17)  HR: 101 (90 - 115)  BP: 140/82 (101/59 - 169/81)  BP(mean): 97 (73 - 113)  RR: 14 (14 - 32)  SpO2: 97% (93% - 99%)  I & Os for 24h ending 01-08 @ 07:00  =============================================  IN: 2322 ml / OUT: 1280 ml / NET: 1042 ml    I & Os for current day (as of 01-08 @ 13:57)  =============================================  IN: 325.6 ml / OUT: 185 ml / NET: 140.6 ml  I & Os for 24h ending 01-08 @ 07:00  =============================================  IN: 2322 ml / OUT: 1280 ml / NET: 1042 ml    I & Os for current day (as of 01-08 @ 13:57)  =============================================  IN: 325.6 ml / OUT: 185 ml / NET: 140.6 ml        Physical Exam:     Neuro: A&OX3 No deficits, lying comfortably in bed  Skin: no rash  Pulm: distant but equal lung sounds  RRR S1S2 no murmur  Abd: Soft NT nd, midline incision with vac CDI, Left IR drain CDI, J-tube in place  Ext: improved edema, warm extremities  MSK no cyanosis or joint pain  vascular 2+ pulses      LABS:      CBC Full  -  ( 08 Jan 2017 06:32 )  WBC Count : 8.1 K/uL  Hemoglobin : 7.2 g/dL  Hematocrit : 24.2 %  Platelet Count - Automated : 239 K/uL  Mean Cell Volume : 85.5 fL  Mean Cell Hemoglobin : 25.4 pg  Mean Cell Hemoglobin Concentration : 29.8 g/dL  Auto Neutrophil # : x  Auto Lymphocyte # : x  Auto Monocyte # : x  Auto Eosinophil # : x  Auto Basophil # : x  Auto Neutrophil % : x  Auto Lymphocyte % : x  Auto Monocyte % : x  Auto Eosinophil % : x  Auto Basophil % : x    08 Jan 2017 06:32    141    |  105    |  22     ----------------------------<  101    3.9     |  25     |  0.45     Ca    9.9        08 Jan 2017 06:32  Phos  2.1       08 Jan 2017 06:32  Mg     1.6       08 Jan 2017 06:32                      RADIOLOGY & ADDITIONAL STUDIES (The following images were personally reviewed):

## 2017-01-08 NOTE — PROGRESS NOTE ADULT - ASSESSMENT
56YOF s/p bypass revision following failed SIPS (anastamotic leak), found to have draining intra-abbdominal abscess, with resolving respiratory failure, pseudomonas pna. ATN, C diff colitis    Neuro: Dilaudid PRN, Tylenol PRN, Lexapro 20mg daily  CVS: MAP > 70, normotensive goals    Pulm: Trach collar/CPAP 10/7, Peridex. Duonebs. Mucomyst.  FEN/GI: J-tube TF 50cc/hr osmolite 1.2. Protonix. Zofran. Vit C syrup.  Zinc sulfate.  Multivitamin.  Rectal tube (stg III ulcer)  : stone (stg III sacral ulcer)  renal: BUN creatinine stable last 48 hours.   Endo: ISS.   ID: S/P drainage of intraabdominal collection by IR. Await culture. MDR Pseudomonas (sputum and peritoneal fluid) - micafungin (12/31- ), colistin (12/31- ), meropenem (12/31- ), tobramycin (12/31- ) \\\ DC gent (12/26-) Imipenem (12/14--), Vanc (12/13-14), Zosyn (12/3-14), Linezolid (12/24-25),  fluconazole (12/4-25), - H. simplex Acyclovir (12/20-25). For now continue meropenem/colistin and inhaled tobra. No further IV tobra unless looks septic given ATN. Continue enteral vancomycin.  Heme: SCDs, SQL   Lines/drains: Abd drain to be placed by IR (1/6-), R PICC (12/21-) to be changed for new PICC ///dc: Left IJ TLC (12/3--), left IJ Juanjo (12/6-12/14).Feeding J-tube.  Wounds: Midline abdominal wound - wound vac (change T/TH/Sat).  Benadryl PRN for itching.

## 2017-01-09 NOTE — PROGRESS NOTE ADULT - SUBJECTIVE AND OBJECTIVE BOX
INTERVAL HPI/OVERNIGHT EVENTS: IR drained 80cc of purulent drainage from abdomen on 1/6 with CHECO drain left in place and currently still draining. She became septic on 1/6 and was given IV tobramycin, linezolid at that time. Cdiff came back positive and was started on PO vancomycin. She has been afebrile for the last 24 hours. She reports she feels better than last week.     ROS:   See above       Allergies    sulfa drugs (Unknown)  sulfamethoxazole (Other)    Intolerances        ANTIBIOTICS/RELEVANT:  antimicrobials  tobramycin for Nebulization 300milliGRAM(s) Inhalation every 12 hours  colistimethate IVPB 100milliGRAM(s) IV Intermittent every 12 hours  micafungin IVPB  IV Intermittent   micafungin IVPB 100milliGRAM(s) IV Intermittent every 24 hours  meropenem IVPB 2000milliGRAM(s) IV Intermittent every 8 hours  vancomycin    Solution 250milliGRAM(s) Oral every 6 hours    immunologic:    OTHER:  pantoprazole  Injectable 40milliGRAM(s) IV Push daily  insulin lispro (HumaLOG) corrective regimen sliding scale  SubCutaneous Before meals and at bedtime  heparin  flush 100 Units/mL Injectable 100Unit(s) IV Push every other day  ALBUTerol/ipratropium for Nebulization 3milliLiter(s) Nebulizer every 6 hours  acetaminophen    Suspension. 650milliGRAM(s) Oral every 6 hours PRN  acetaminophen    Suspension 650milliGRAM(s) Enteral Tube every 6 hours PRN  ondansetron Injectable 4milliGRAM(s) IV Push every 6 hours PRN  ascorbic acid Syrup 500milliGRAM(s) Oral daily  zinc sulfate 220milliGRAM(s) Oral daily  diphenhydrAMINE   Injectable 25milliGRAM(s) IntraMuscular every 4 hours PRN  multivitamin 1Tablet(s) Oral daily  cyanocobalamin Injectable 1000MICROGram(s) IntraMuscular every 7 days  cholecalciferol 2000Unit(s) Oral daily  collagenase Ointment 1Application(s) Topical daily  enoxaparin Injectable 80milliGRAM(s) SubCutaneous <User Schedule>  HYDROmorphone  Injectable 1milliGRAM(s) IV Push every 4 hours PRN  HYDROmorphone  Injectable 0.5milliGRAM(s) IV Push every 4 hours PRN      Objective:  Vital Signs Last 24 Hrs  T(C): 37.4, Max: 37.6 (01-08 @ 17:57)  T(F): 99.3, Max: 99.7 (01-08 @ 22:00)  HR: 110 (92 - 110)  BP: 155/80 (134/68 - 164/77)  BP(mean): 111 (89 - 127)  RR: 32 (20 - 40)  SpO2: 95% (94% - 100%)    PHYSICAL EXAM:  General - awake, alert, responding to questions appropriately.NAD   HEENT - MMM, EOMI, PERRL no oral lesions   CV - S1, S2 RRR no murmurs   Resp - decreased breath sounds b/l   Abdomen - obese, +wound vac +Jtube +CHECO with 5cc serosanguinous fluid , nt/nd, +bs  Extremities - wwp, moving all extremities +rectal +stone       LABS:                        7.1    7.7   )-----------( 226      ( 09 Jan 2017 05:42 )             23.4     09 Jan 2017 05:43    138    |  103    |  16     ----------------------------<  98     4.3     |  27     |  0.41     Ca    9.7        09 Jan 2017 05:43  Phos  2.5       09 Jan 2017 05:43  Mg     1.6       09 Jan 2017 05:43              MICROBIOLOGY:  Cdiff 1/7: positive     Bl Cx 12/16, 12/24, ?, 1/6: NGTD     Abd fluid 12/11: strep milleri, moderate yeast     Bronchial wash 12/13, 12/24: MDR pseudomonas  Bronchial wash 12/14: yeast    Sputum 12/27: Moderate Pseudomonas, MDR        Pleural fluid: yellow, , glucose 79, Total protein 4.4, Nuc cell count 570, RBC 4550, segmented granulocytes 22, lymphocytes 55, monocyte/macrophage 11, eosinophil 7, mesothelial cells 5    Pleural fluid Cx 12/28: No growth      Abd fluid CHECO 12/30: MDR pseudomonas     Abd abscess 1/7: pending, Gstain no organisms, numerous WBCs            RADIOLOGY & ADDITIONAL STUDIES:  CT c/a/p 1/3: Complete collapse of the right lower lobe. Complete collapse of the left   lower lobe. Small bilateral pleural effusions. 1. Persistent localized peripherally enhancing fluid collections within the pelvis most consistent with intraperitoneal abscesses. These have not significantly changed in size   when compared to the prior  Study. 2. S/p gastrectomy and gastrojejunal anastomosis with endoluminal stent in place. There has been removal of the nasogastric tube. 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 4. Grossly stable small bore percutaneous surgical drain in the right mid abdomen terminating in the left midabdomen with interval removal of the larger bore percutaneous  drainage catheter previously noted in the right midabdomen.  5. Stable hepatosplenomegaly.  6. Stable appearance of the left kidney demonstrating dilatation of the left renal pelvis versus a  parapelvic cyst and small 1 cm left cortical cyst. 7. Slightly decreasing subcutaneous edema      CXR 1/1: mild improvement in congestion and/or infiltrates     CT c/a/p w/ IV and green dye contrast 12/27: s/p gastrectomy and gastrojejunal anastomosis with a stent in place. 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  A few rim-enhancing fluid collections in the lower abdomen and pelvis which might be infected.Moderate to large left pleural effusion, enlarged since prior study. Small right pleural effusion. Extensive atelectatic changes in the lungs,   left greater than right.    CT c/a/p 12/21: 1.  Interval removal of endoluminal stent from the distal esophagus   extending to the jejunal loop. Interval placement of an esophageal stent. 2.  Slight interval decrease in small to moderate ascites, particularly   in the left upper quadrant surrounding the anastomosis. The largest pocket measures 1.3 x 6.6 cm in the midline upper pelvis, similar to the   prior study.3.  Further interval decrease in free air, with a a few punctate foci   remaining.4.  Slight interval decrease in small right pleural effusion. No change   in small left pleural effusion. Associated atelectasis bilaterally,   unchanged.5.  New anterior abdominal wall defect at the surgical incision site, presumably from interval debridement. 6.  Mild cardiomegaly and trace pericardial effusion, unchanged.  7.  Diffuse anasarca.        CXR 12/25: Improvement of opacification left hemithorax in comparison to   prior examination of the chest 12/25/2016. Bilateral effusions.   Underlying infiltrates cannot be excluded INTERVAL HPI/OVERNIGHT EVENTS: IR drained 80cc of purulent drainage from abdomen on 1/6 with CHECO drain left in place and currently still draining. She became septic on 1/6 and was given IV tobramycin, linezolid at that time. Cdiff came back positive and was started on PO vancomycin. She has been afebrile for the last 24 hours. She reports she feels better than last week.     ROS:   See above       Allergies    sulfa drugs (Unknown)  sulfamethoxazole (Other)      ANTIBIOTICS/RELEVANT:  antimicrobials  tobramycin for Nebulization 300milliGRAM(s) Inhalation every 12 hours  colistimethate IVPB 100milliGRAM(s) IV Intermittent every 12 hours  micafungin IVPB  IV Intermittent   micafungin IVPB 100milliGRAM(s) IV Intermittent every 24 hours  meropenem IVPB 2000milliGRAM(s) IV Intermittent every 8 hours  vancomycin    Solution 250milliGRAM(s) Oral every 6 hours    immunologic:    OTHER:  pantoprazole  Injectable 40milliGRAM(s) IV Push daily  insulin lispro (HumaLOG) corrective regimen sliding scale  SubCutaneous Before meals and at bedtime  heparin  flush 100 Units/mL Injectable 100Unit(s) IV Push every other day  ALBUTerol/ipratropium for Nebulization 3milliLiter(s) Nebulizer every 6 hours  acetaminophen    Suspension. 650milliGRAM(s) Oral every 6 hours PRN  acetaminophen    Suspension 650milliGRAM(s) Enteral Tube every 6 hours PRN  ondansetron Injectable 4milliGRAM(s) IV Push every 6 hours PRN  ascorbic acid Syrup 500milliGRAM(s) Oral daily  zinc sulfate 220milliGRAM(s) Oral daily  diphenhydrAMINE   Injectable 25milliGRAM(s) IntraMuscular every 4 hours PRN  multivitamin 1Tablet(s) Oral daily  cyanocobalamin Injectable 1000MICROGram(s) IntraMuscular every 7 days  cholecalciferol 2000Unit(s) Oral daily  collagenase Ointment 1Application(s) Topical daily  enoxaparin Injectable 80milliGRAM(s) SubCutaneous <User Schedule>  HYDROmorphone  Injectable 1milliGRAM(s) IV Push every 4 hours PRN  HYDROmorphone  Injectable 0.5milliGRAM(s) IV Push every 4 hours PRN      Objective:  Vital Signs Last 24 Hrs  T(C): 37.4, Max: 37.6 (01-08 @ 17:57)  T(F): 99.3, Max: 99.7 (01-08 @ 22:00)  HR: 110 (92 - 110)  BP: 155/80 (134/68 - 164/77)  BP(mean): 111 (89 - 127)  RR: 32 (20 - 40)  SpO2: 95% (94% - 100%)    PHYSICAL EXAM:  General - awake, alert, responding to questions appropriately.NAD   HEENT - MMM, EOMI, PERRL no oral lesions   CV - S1, S2 RRR no murmurs   Resp - decreased breath sounds b/l   Abdomen - obese, +wound vac +Jtube +CHECO with 5cc serosanguinous fluid , nt/nd, +bs  Extremities - wwp, moving all extremities +rectal +stone       LABS:                        7.1    7.7   )-----------( 226      ( 09 Jan 2017 05:42 )             23.4     09 Jan 2017 05:43    138    |  103    |  16     ----------------------------<  98     4.3     |  27     |  0.41     Ca    9.7        09 Jan 2017 05:43  Phos  2.5       09 Jan 2017 05:43  Mg     1.6       09 Jan 2017 05:43              MICROBIOLOGY:  Cdiff 1/7: positive     Bl Cx 12/16, 12/24, ?, 1/6: NGTD     Abd fluid 12/11: strep milleri, moderate yeast     Bronchial wash 12/13, 12/24: MDR pseudomonas  Bronchial wash 12/14: yeast    Sputum 12/27: Moderate Pseudomonas, MDR        Pleural fluid: yellow, , glucose 79, Total protein 4.4, Nuc cell count 570, RBC 4550, segmented granulocytes 22, lymphocytes 55, monocyte/macrophage 11, eosinophil 7, mesothelial cells 5    Pleural fluid Cx 12/28: No growth      Abd fluid CHECO 12/30: MDR pseudomonas     Abd abscess 1/7: pending, Gstain no organisms, numerous WBCs            RADIOLOGY & ADDITIONAL STUDIES:  CT c/a/p 1/3: Complete collapse of the right lower lobe. Complete collapse of the left   lower lobe. Small bilateral pleural effusions. 1. Persistent localized peripherally enhancing fluid collections within the pelvis most consistent with intraperitoneal abscesses. These have not significantly changed in size   when compared to the prior  Study. 2. S/p gastrectomy and gastrojejunal anastomosis with endoluminal stent in place. There has been removal of the nasogastric tube. 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 4. Grossly stable small bore percutaneous surgical drain in the right mid abdomen terminating in the left midabdomen with interval removal of the larger bore percutaneous  drainage catheter previously noted in the right midabdomen.  5. Stable hepatosplenomegaly.  6. Stable appearance of the left kidney demonstrating dilatation of the left renal pelvis versus a  parapelvic cyst and small 1 cm left cortical cyst. 7. Slightly decreasing subcutaneous edema      CXR 1/1: mild improvement in congestion and/or infiltrates     CT c/a/p w/ IV and green dye contrast 12/27: s/p gastrectomy and gastrojejunal anastomosis with a stent in place. 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  A few rim-enhancing fluid collections in the lower abdomen and pelvis which might be infected.Moderate to large left pleural effusion, enlarged since prior study. Small right pleural effusion. Extensive atelectatic changes in the lungs,   left greater than right.    CT c/a/p 12/21: 1.  Interval removal of endoluminal stent from the distal esophagus   extending to the jejunal loop. Interval placement of an esophageal stent. 2.  Slight interval decrease in small to moderate ascites, particularly   in the left upper quadrant surrounding the anastomosis. The largest pocket measures 1.3 x 6.6 cm in the midline upper pelvis, similar to the   prior study.3.  Further interval decrease in free air, with a a few punctate foci   remaining.4.  Slight interval decrease in small right pleural effusion. No change   in small left pleural effusion. Associated atelectasis bilaterally,   unchanged.5.  New anterior abdominal wall defect at the surgical incision site, presumably from interval debridement. 6.  Mild cardiomegaly and trace pericardial effusion, unchanged.  7.  Diffuse anasarca.        CXR 12/25: Improvement of opacification left hemithorax in comparison to   prior examination of the chest 12/25/2016. Bilateral effusions.   Underlying infiltrates cannot be excluded

## 2017-01-09 NOTE — PROGRESS NOTE ADULT - ASSESSMENT
56 year old critical patient s/p SIPs procedure with free air concerning for anastomotic leak s/p emergent ex lap with abdominal washout found to have leak of esophagojejunostomy which they could not repair due to its location.  She is s/p esophageal stent placement, peritoneal washout and internal double pigtail stent placement. During endoscopy second defect noted on enteric side of anastomosis. Repeat endoscopy removed stent and placed a second stent over entire 5 cm anastomotic defect. 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 She has had repeated bronchoscopies with 12/13 and 12/24 bronchial wash growing MDR pseudomonas. She had a pigtail placed, roughly 600cc fluid removed and chest tube removed. She had a repeat CT scan which showed persistent fluid collections (abdominal abscesses) and collapsed lung b/l. We are concerned of two sites of infection - lung (XDR Pseudomonas) and intrabdominal collections (polymicrobial- Pseudomonas, yeast, strep milleri in previous cultures), the latter which was drained by IR on 1/6 with CHECO in place and await cultures. She was also found to be Cdiff positive 1/7 and was started on enteral vanc (1/7-). We recommend:   -Treat XDR pseudomonas with colistin (low dose) and synergistic effect with meropenem (which also has GNR +strep milleri coverage) and additional IV aminoglycoside/Tobramycin (for systemic infection) as well as nebulized aminoglycoside Tobramycin for lung infection. Await culture sensitivities from abdominal drainage by IR - this may take longer than usual cultures as she has been on long duration of antibiotics. Will call micro to hold cultures for 2 weeks.   -Continue with enteral vanc 250 q6 for Cdiff   -Continue with micafungin for previous cx of yeast+ (no speciation of yeast on previous abdominal 12/11 cx)  -This was discussed with the primary team, please call with any questions. Will continue to follow.

## 2017-01-09 NOTE — PROGRESS NOTE ADULT - SUBJECTIVE AND OBJECTIVE BOX
Interventional Radiology Note    Output noted. Flushed easily with 6 cc of normal saline. Will continue to follow.

## 2017-01-09 NOTE — PROGRESS NOTE ADULT - ASSESSMENT
56YOF s/p bypass revision following failed SIPS (anastamotic leak), found to have draining intra-abbdominal abscess, multiple bronchs for mucous plugging    Neuro: Dilaudid PRN, Tylenol PRN, Lexapro 20mg daily  CVS: MAP > 70, normotensive goals    Pulm: Trach collar, Peridex. Duonebs. Mucomyst.  FEN/GI: J-tube TF 50cc/hr osmolite 1.2. Protonix. Zofran PRN. Vit C, MVT.  Zinc sulfate.  Multivitamin.  Rectal tube (stg III ulcer)  : stone (stg III sacral ulcer)  Endo: ISS.   ID: C. diff: Vancomycin PO (1/7) MDR Pseudomonas (sputum and peritoneal fluid) - linezolid (1/7) micafungin (12/31- ), colistin (12/31- ), meropenem (12/31- ), INH tobramycin (12/31-- ) \\\ DCIV tobra (1/3, 1/4, 1/6), gent (12/26-) Imipenem (12/14--), Vanc (12/13-14), Zosyn (12/3-14), Linezolid (12/24-25),  fluconazole (12/4-25), - H. simplex Acyclovir (12/20-25)   Heme: SCDs, lovenox 80 bid,   Lines/drains: R PICC (12/21-) to be changed for new PICC ///dc: Left IJ TLC (12/3--), left IJ Juanjo (12/6-12/14).Feeding J-tube.  Wounds: Midline abdominal wound - wound vac (change T/TH/Sat).  Benadryl PRN for itching. 56YOF s/p bypass revision following failed SIPS (anastamotic leak), found to have draining intra-abbdominal abscess, resolving acute respiratory failure, RUE DVT, pna.    Neuro: Dilaudid PRN, Tylenol PRN, Lexapro 20mg daily  CVS: MAP > 70, normotensive goals    Pulm: Trach collar, Peridex. Duonebs. Have deflated trach cuff and will follow  FEN/GI: J-tube TF 50cc/hr osmolite 1.2. Protonix. Zofran PRN. Vit C, MVT.  Zinc sulfate.  Multivitamin.  Rectal tube (stg III ulcer)  : stone (stg III sacral ulcer)  Endo: ISS.   ID: C. diff: Vancomycin PO (1/7) MDR Pseudomonas (sputum and peritoneal fluid) -  micafungin (12/31- ), colistin (12/31- ), meropenem (12/31- ), INH tobramycin (12/31-- ) \\\ DCIV tobra (1/3, 1/4, 1/6), gent (12/26-) Imipenem (12/14--), Vanc (12/13-14), Zosyn (12/3-14), Linezolid (12/24-25),  fluconazole (12/4-25), - H. simplex Acyclovir (12/20-25). Have stopped linezolid as no evidence for gram positive infection. If stable will try to DC micafungin.  Heme: SCDs, lovenox 80 bid,   Lines/drains: R PICC (12/21-) to be changed for new PICC ///dc: Left IJ TLC (12/3--), left IJ Juanjo (12/6-12/14).Feeding J-tube.  Wounds: Midline abdominal wound - wound vac (change T/TH/Sat).  Benadryl PRN for itching.

## 2017-01-09 NOTE — PROGRESS NOTE ADULT - SUBJECTIVE AND OBJECTIVE BOX
patient is continuing to improve at reasonable rate  she has now been on trach collar and cuff is deflated  the collection which was in pelvis was drained and now clear  wbc is normal  the vac still has the brownish dirty discharge but this is down to 40 cc  she remains on jejunostomy tube feedings and dressing looks very reasonable  discussed plan with icu staff and dr curtis  will continue to try to move forward towards decannulation  will re ct scan is spikes  will keep stent in place which has been in place for 20 days for another week or two and consider repeat endoscopy  also with reported c diff  overall will continue with current plan  once decanulated will consider po when airway completely stable  at this point with j tube in place there is no rush

## 2017-01-09 NOTE — PROGRESS NOTE ADULT - SUBJECTIVE AND OBJECTIVE BOX
S: Pt seen and examined at bedside.  Tolerated trach collar throughout the day yesterday.  Overnight was tachypneic to RR of 40, improved with analgesia.  Otherwise no acute events, stable at this time.  Denies chest pain/SOB.  Itchiness has improved since the weekend, pain generally well-controlled.  Has been up to chair for several hours each day.  Tolerating tube feeds, denies nausea/vomiting.      O: ICU Vital Signs Last 24 Hrs  T(F): 98.4, Max: 99.7 (01-08 @ 22:00)  HR: 98 (90 - 109)  BP: 154/81 (134/68 - 164/77)  BP(mean): 111 (89 - 127)  ABP: --  RR: 29 (14 - 40)  SpO2: 99% (94% - 100%)  Wt(kg): --    PHYSICAL EXAM:     Gen: AAOx3, NAD    Cardiovascular: RRR, no MRG  Respiratory: CTAB  Gastrointestinal: soft, NT, ND, BS+.  Midline wound vac c/d/i.  J tube and R IR drain in place  Extremities: warm, no dependent edema, peripheral pulses palpated, SCDs in place  Vascular: no cyanosis/erythema    LABS:    09 Jan 2017 05:43    138    |  103    |  16     ----------------------------<  98     4.3     |  27     |  0.41     Ca    9.7        09 Jan 2017 05:43  Phos  2.5       09 Jan 2017 05:43  Mg     1.6       09 Jan 2017 05:43                          7.1    7.7   )-----------( 226      ( 09 Jan 2017 05:42 )             23.4     CAPILLARY BLOOD GLUCOSE  107 (09 Jan 2017 06:00)  82 (08 Jan 2017 22:00)  74 (08 Jan 2017 18:00)  90 (08 Jan 2017 12:00)    MEDICATIONS  (STANDING):  pantoprazole  Injectable 40milliGRAM(s) IV Push daily  insulin lispro (HumaLOG) corrective regimen sliding scale  SubCutaneous Before meals and at bedtime  heparin  flush 100 Units/mL Injectable 100Unit(s) IV Push every other day  ALBUTerol/ipratropium for Nebulization 3milliLiter(s) Nebulizer every 6 hours  sodium chloride 0.9% lock flush 20milliLiter(s) IV Push once  ascorbic acid Syrup 500milliGRAM(s) Oral daily  zinc sulfate 220milliGRAM(s) Oral daily  tobramycin for Nebulization 300milliGRAM(s) Inhalation every 12 hours  colistimethate IVPB 100milliGRAM(s) IV Intermittent every 12 hours  micafungin IVPB  IV Intermittent   micafungin IVPB 100milliGRAM(s) IV Intermittent every 24 hours  multivitamin 1Tablet(s) Oral daily  meropenem IVPB 2000milliGRAM(s) IV Intermittent every 8 hours  cyanocobalamin Injectable 1000MICROGram(s) IntraMuscular every 7 days  cholecalciferol 2000Unit(s) Oral daily  collagenase Ointment 1Application(s) Topical daily  enoxaparin Injectable 80milliGRAM(s) SubCutaneous <User Schedule>  linezolid  IVPB  IV Intermittent   linezolid  IVPB 600milliGRAM(s) IV Intermittent every 12 hours  vancomycin    Solution 250milliGRAM(s) Oral every 6 hours    MEDICATIONS  (PRN):  acetaminophen    Suspension. 650milliGRAM(s) Oral every 6 hours PRN Mild Pain (1 - 3)  sodium chloride 0.9% lock flush 10milliLiter(s) IV Push every 1 hour PRN After each medication administration  sodium chloride 0.9% lock flush 10milliLiter(s) IV Push every 12 hours PRN Lumen of catheter NOT used  acetaminophen    Suspension 650milliGRAM(s) Enteral Tube every 6 hours PRN For Temp greater than 38 C (100.4 F)  ondansetron Injectable 4milliGRAM(s) IV Push every 6 hours PRN Nausea and/or Vomiting  diphenhydrAMINE   Injectable 25milliGRAM(s) IntraMuscular every 4 hours PRN Rash and/or Itching  HYDROmorphone  Injectable 1milliGRAM(s) IV Push every 4 hours PRN Severe Pain (7 - 10)  HYDROmorphone  Injectable 0.5milliGRAM(s) IV Push every 4 hours PRN Moderate Pain (4 - 6) S: Pt seen and examined at bedside.  Tolerated trach collar throughout the day yesterday.  Overnight was tachypneic to RR of 40, improved with analgesia.  Otherwise no acute events, stable at this time.  Denies chest pain/SOB.  Itchiness has improved since the weekend, pain generally well-controlled.  Has been up to chair for several hours each day.  Tolerating tube feeds, denies nausea/vomiting or SOB/CP.      O: ICU Vital Signs Last 24 Hrs  T(F): 98.4, Max: 99.7 (01-08 @ 22:00)  HR: 98 (90 - 109)  BP: 154/81 (134/68 - 164/77)  BP(mean): 111 (89 - 127)  ABP: --  RR: 29 (14 - 40)  SpO2: 99% (94% - 100%)  Wt(kg): --    PHYSICAL EXAM:     Gen: AAOx3, NAD  Skin: no rash  HEENT: PERRL, EOMI, tongue moist, throat clear    Cardiovascular: RRR, no MRG  Respiratory: CTAB  Gastrointestinal: soft, NT, ND, BS+.  Midline wound vac c/d/i.  J tube and R IR drain in place  Extremities: warm, no dependent edema, peripheral pulses palpated, SCDs in place  MSK: no cyanosis/erythema  vascular 2+ pulses throughout    LABS:    09 Jan 2017 05:43    138    |  103    |  16     ----------------------------<  98     4.3     |  27     |  0.41     Ca    9.7        09 Jan 2017 05:43  Phos  2.5       09 Jan 2017 05:43  Mg     1.6       09 Jan 2017 05:43                          7.1    7.7   )-----------( 226      ( 09 Jan 2017 05:42 )             23.4     CAPILLARY BLOOD GLUCOSE  107 (09 Jan 2017 06:00)  82 (08 Jan 2017 22:00)  74 (08 Jan 2017 18:00)  90 (08 Jan 2017 12:00)    MEDICATIONS  (STANDING):  pantoprazole  Injectable 40milliGRAM(s) IV Push daily  insulin lispro (HumaLOG) corrective regimen sliding scale  SubCutaneous Before meals and at bedtime  heparin  flush 100 Units/mL Injectable 100Unit(s) IV Push every other day  ALBUTerol/ipratropium for Nebulization 3milliLiter(s) Nebulizer every 6 hours  sodium chloride 0.9% lock flush 20milliLiter(s) IV Push once  ascorbic acid Syrup 500milliGRAM(s) Oral daily  zinc sulfate 220milliGRAM(s) Oral daily  tobramycin for Nebulization 300milliGRAM(s) Inhalation every 12 hours  colistimethate IVPB 100milliGRAM(s) IV Intermittent every 12 hours  micafungin IVPB  IV Intermittent   micafungin IVPB 100milliGRAM(s) IV Intermittent every 24 hours  multivitamin 1Tablet(s) Oral daily  meropenem IVPB 2000milliGRAM(s) IV Intermittent every 8 hours  cyanocobalamin Injectable 1000MICROGram(s) IntraMuscular every 7 days  cholecalciferol 2000Unit(s) Oral daily  collagenase Ointment 1Application(s) Topical daily  enoxaparin Injectable 80milliGRAM(s) SubCutaneous <User Schedule>  linezolid  IVPB  IV Intermittent   linezolid  IVPB 600milliGRAM(s) IV Intermittent every 12 hours  vancomycin    Solution 250milliGRAM(s) Oral every 6 hours    MEDICATIONS  (PRN):  acetaminophen    Suspension. 650milliGRAM(s) Oral every 6 hours PRN Mild Pain (1 - 3)  sodium chloride 0.9% lock flush 10milliLiter(s) IV Push every 1 hour PRN After each medication administration  sodium chloride 0.9% lock flush 10milliLiter(s) IV Push every 12 hours PRN Lumen of catheter NOT used  acetaminophen    Suspension 650milliGRAM(s) Enteral Tube every 6 hours PRN For Temp greater than 38 C (100.4 F)  ondansetron Injectable 4milliGRAM(s) IV Push every 6 hours PRN Nausea and/or Vomiting  diphenhydrAMINE   Injectable 25milliGRAM(s) IntraMuscular every 4 hours PRN Rash and/or Itching  HYDROmorphone  Injectable 1milliGRAM(s) IV Push every 4 hours PRN Severe Pain (7 - 10)  HYDROmorphone  Injectable 0.5milliGRAM(s) IV Push every 4 hours PRN Moderate Pain (4 - 6)

## 2017-01-10 NOTE — PROGRESS NOTE ADULT - SUBJECTIVE AND OBJECTIVE BOX
INTERVAL HPI/OVERNIGHT EVENTS: Patient seen at bedside today. Tolerating trach collar without incidents or problems. Reports feeling okay, denies subjective chills, nausea, vomiting. Rectal tube continues to drain.    ROS:   See above       Allergies: sulfa drugs (Unknown)  sulfamethoxazole (Other)      ANTIBIOTICS/RELEVANT:  antimicrobials  tobramycin for Nebulization 300milliGRAM(s) Inhalation every 12 hours  colistimethate IVPB 100milliGRAM(s) IV Intermittent every 12 hours  micafungin IVPB  IV Intermittent   micafungin IVPB 100milliGRAM(s) IV Intermittent every 24 hours  meropenem IVPB 2000milliGRAM(s) IV Intermittent every 8 hours  vancomycin    Solution 250milliGRAM(s) Oral every 6 hours    MEDICATIONS  (STANDING):  pantoprazole  Injectable 40milliGRAM(s) IV Push daily  insulin lispro (HumaLOG) corrective regimen sliding scale  SubCutaneous Before meals and at bedtime  heparin  flush 100 Units/mL Injectable 100Unit(s) IV Push every other day  ALBUTerol/ipratropium for Nebulization 3milliLiter(s) Nebulizer every 6 hours  sodium chloride 0.9% lock flush 20milliLiter(s) IV Push once  ascorbic acid Syrup 500milliGRAM(s) Oral daily  zinc sulfate 220milliGRAM(s) Oral daily  tobramycin for Nebulization 300milliGRAM(s) Inhalation every 12 hours  colistimethate IVPB 100milliGRAM(s) IV Intermittent every 12 hours  multivitamin 1Tablet(s) Oral daily  meropenem IVPB 2000milliGRAM(s) IV Intermittent every 8 hours  cyanocobalamin Injectable 1000MICROGram(s) IntraMuscular every 7 days  cholecalciferol 2000Unit(s) Oral daily  collagenase Ointment 1Application(s) Topical daily  enoxaparin Injectable 80milliGRAM(s) SubCutaneous <User Schedule>  vancomycin    Solution 250milliGRAM(s) Oral every 6 hours  potassium acid phosphate/sodium acid phosphate tablet (K-PHOS No. 2) 2Tablet(s) Oral once    MEDICATIONS  (PRN):  acetaminophen    Suspension. 650milliGRAM(s) Oral every 6 hours PRN Mild Pain (1 - 3)  sodium chloride 0.9% lock flush 10milliLiter(s) IV Push every 1 hour PRN After each medication administration  sodium chloride 0.9% lock flush 10milliLiter(s) IV Push every 12 hours PRN Lumen of catheter NOT used  acetaminophen    Suspension 650milliGRAM(s) Enteral Tube every 6 hours PRN For Temp greater than 38 C (100.4 F)  ondansetron Injectable 4milliGRAM(s) IV Push every 6 hours PRN Nausea and/or Vomiting  diphenhydrAMINE   Injectable 25milliGRAM(s) IntraMuscular every 4 hours PRN Rash and/or Itching  oxyCODONE  5 mG/acetaminophen 325 mG 1Tablet(s) Oral every 4 hours PRN Moderate Pain (4 - 6)  oxyCODONE  5 mG/acetaminophen 325 mG 2Tablet(s) Oral every 4 hours PRN Severe Pain (7 - 10)      Objective:  ICU Vital Signs Last 24 Hrs  T(C): 37.4, Max: 37.8 (01-10 @ 05:32)  T(F): 99.3, Max: 100 (01-10 @ 05:32)  HR: 100 (90 - 110)  BP: 141/87 (124/66 - 170/80)  BP(mean): 111 (87 - 116)  ABP: --  ABP(mean): --  RR: 30 (26 - 36)  SpO2: 99% (93% - 100%)      PHYSICAL EXAM:  General - awake, alert, responding to questions appropriately. NAD   HEENT - MMM, EOMI, PERRL no oral lesions   CV - S1, S2 RRR, no murmurs   Resp - mildly tachypneic, decreased breath sounds b/l   Abdomen - obese, +wound vac +Jtube +CHECO, nontender to palpation, +BS  Extremities - warm, well perfused, mild pitting edema of lower extremities, rectal tube in place      LABS:                        7.1    7.5   )-----------( 256      ( 10 Jorge 2017 04:44 )             23.4   10 Jorge 2017 04:44    138    |  102    |  13     ----------------------------<  102    4.4     |  28     |  0.36     Ca    9.6        10 Jorge 2017 04:44  Phos  2.7       10 Jorge 2017 04:44  Mg     1.6       10 Jorge 2017 04:44      MICROBIOLOGY:  Cdiff 1/7: positive     Bl Cx 12/16, 12/24, ?, 1/6: NGTD     Abd fluid 12/11: strep milleri, moderate yeast     Bronchial wash 12/13, 12/24: MDR pseudomonas  Bronchial wash 12/14: yeast    Sputum 12/27: Moderate Pseudomonas, MDR        Pleural fluid: yellow, , glucose 79, Total protein 4.4, Nuc cell count 570, RBC 4550, segmented granulocytes 22, lymphocytes 55, monocyte/macrophage 11, eosinophil 7, mesothelial cells 5    Pleural fluid Cx 12/28: No growth      Abd fluid CHECO 12/30: MDR pseudomonas     Abd abscess 1/7: no growth, no organisms seen, numerous WBCs      RADIOLOGY & ADDITIONAL STUDIES:  CT c/a/p 1/3: Complete collapse of the right lower lobe. Complete collapse of the left   lower lobe. Small bilateral pleural effusions. 1. Persistent localized peripherally enhancing fluid collections within the pelvis most consistent with intraperitoneal abscesses. These have not significantly changed in size   when compared to the prior  Study. 2. S/p gastrectomy and gastrojejunal anastomosis with endoluminal stent in place. There has been removal of the nasogastric tube. 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 4. Grossly stable small bore percutaneous surgical drain in the right mid abdomen terminating in the left midabdomen with interval removal of the larger bore percutaneous  drainage catheter previously noted in the right midabdomen.  5. Stable hepatosplenomegaly.  6. Stable appearance of the left kidney demonstrating dilatation of the left renal pelvis versus a  parapelvic cyst and small 1 cm left cortical cyst. 7. Slightly decreasing subcutaneous edema      CXR 1/1: mild improvement in congestion and/or infiltrates     CT c/a/p w/ IV and green dye contrast 12/27: s/p gastrectomy and gastrojejunal anastomosis with a stent in place. Oral contrast outside the lumen of the stent is suspicious for   leakage. 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  A few rim-enhancing fluid collections in the lower abdomen and pelvis which might be infected.Moderate to large left pleural effusion, enlarged since prior study. Small right pleural effusion. Extensive atelectatic changes in the lungs,   left greater than right.    CT c/a/p 12/21: 1.  Interval removal of endoluminal stent from the distal esophagus   extending to the jejunal loop. Interval placement of an esophageal stent. 2.  Slight interval decrease in small to moderate ascites, particularly   in the left upper quadrant surrounding the anastomosis. The largest pocket measures 1.3 x 6.6 cm in the midline upper pelvis, similar to the   prior study.3.  Further interval decrease in free air, with a a few punctate foci   remaining.4.  Slight interval decrease in small right pleural effusion. No change   in small left pleural effusion. Associated atelectasis bilaterally,   unchanged.5.  New anterior abdominal wall defect at the surgical incision site, presumably from interval debridement. 6.  Mild cardiomegaly and trace pericardial effusion, unchanged.  7.  Diffuse anasarca.    CXR 12/25: Improvement of opacification left hemithorax in comparison to   prior examination of the chest 12/25/2016. Bilateral effusions.   Underlying infiltrates cannot be excluded  INTERVAL HPI/OVERNIGHT EVENTS: IR drained 80cc of purulent drainage from abdomen on 1/6 with CHECO drain left in place and currently still draining. She became septic on 1/6 and was given IV tobramycin, linezolid at that time. Cdiff came back positive and was started on PO vancomycin. She has been afebrile for the last 24 hours. She reports she feels better than last week.     ROS:   See above       Allergies    sulfa drugs (Unknown)  sulfamethoxazole (Other)    Intolerances        ANTIBIOTICS/RELEVANT:  antimicrobials  tobramycin for Nebulization 300milliGRAM(s) Inhalation every 12 hours  colistimethate IVPB 100milliGRAM(s) IV Intermittent every 12 hours  micafungin IVPB  IV Intermittent   micafungin IVPB 100milliGRAM(s) IV Intermittent every 24 hours  meropenem IVPB 2000milliGRAM(s) IV Intermittent every 8 hours  vancomycin    Solution 250milliGRAM(s) Oral every 6 hours    immunologic:    OTHER:  pantoprazole  Injectable 40milliGRAM(s) IV Push daily  insulin lispro (HumaLOG) corrective regimen sliding scale  SubCutaneous Before meals and at bedtime  heparin  flush 100 Units/mL Injectable 100Unit(s) IV Push every other day  ALBUTerol/ipratropium for Nebulization 3milliLiter(s) Nebulizer every 6 hours  acetaminophen    Suspension. 650milliGRAM(s) Oral every 6 hours PRN  acetaminophen    Suspension 650milliGRAM(s) Enteral Tube every 6 hours PRN  ondansetron Injectable 4milliGRAM(s) IV Push every 6 hours PRN  ascorbic acid Syrup 500milliGRAM(s) Oral daily  zinc sulfate 220milliGRAM(s) Oral daily  diphenhydrAMINE   Injectable 25milliGRAM(s) IntraMuscular every 4 hours PRN  multivitamin 1Tablet(s) Oral daily  cyanocobalamin Injectable 1000MICROGram(s) IntraMuscular every 7 days  cholecalciferol 2000Unit(s) Oral daily  collagenase Ointment 1Application(s) Topical daily  enoxaparin Injectable 80milliGRAM(s) SubCutaneous <User Schedule>  HYDROmorphone  Injectable 1milliGRAM(s) IV Push every 4 hours PRN  HYDROmorphone  Injectable 0.5milliGRAM(s) IV Push every 4 hours PRN      Objective:  Vital Signs Last 24 Hrs  T(C): 37.4, Max: 37.6 (01-08 @ 17:57)  T(F): 99.3, Max: 99.7 (01-08 @ 22:00)  HR: 110 (92 - 110)  BP: 155/80 (134/68 - 164/77)  BP(mean): 111 (89 - 127)  RR: 32 (20 - 40)  SpO2: 95% (94% - 100%)    PHYSICAL EXAM:  General - awake, alert, responding to questions appropriately.NAD   HEENT - MMM, EOMI, PERRL no oral lesions   CV - S1, S2 RRR no murmurs   Resp - decreased breath sounds b/l   Abdomen - obese, +wound vac +Jtube +CHECO with 5cc serosanguinous fluid , nt/nd, +bs  Extremities - wwp, moving all extremities +rectal +stone       LABS:                        7.1    7.7   )-----------( 226      ( 09 Jan 2017 05:42 )             23.4     09 Jan 2017 05:43    138    |  103    |  16     ----------------------------<  98     4.3     |  27     |  0.41     Ca    9.7        09 Jan 2017 05:43  Phos  2.5       09 Jan 2017 05:43  Mg     1.6       09 Jan 2017 05:43              MICROBIOLOGY:  Cdiff 1/7: positive     Bl Cx 12/16, 12/24, ?, 1/6: NGTD     Abd fluid 12/11: strep milleri, moderate yeast     Bronchial wash 12/13, 12/24: MDR pseudomonas  Bronchial wash 12/14: yeast    Sputum 12/27: Moderate Pseudomonas, MDR        Pleural fluid: yellow, , glucose 79, Total protein 4.4, Nuc cell count 570, RBC 4550, segmented granulocytes 22, lymphocytes 55, monocyte/macrophage 11, eosinophil 7, mesothelial cells 5    Pleural fluid Cx 12/28: No growth      Abd fluid CHECO 12/30: MDR pseudomonas     Abd abscess 1/7: pending, Gstain no organisms, numerous WBCs            RADIOLOGY & ADDITIONAL STUDIES:  CT c/a/p 1/3: Complete collapse of the right lower lobe. Complete collapse of the left   lower lobe. Small bilateral pleural effusions. 1. Persistent localized peripherally enhancing fluid collections within the pelvis most consistent with intraperitoneal abscesses. These have not significantly changed in size   when compared to the prior  Study. 2. S/p gastrectomy and gastrojejunal anastomosis with endoluminal stent in place. There has been removal of the nasogastric tube. 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 4. Grossly stable small bore percutaneous surgical drain in the right mid abdomen terminating in the left midabdomen with interval removal of the larger bore percutaneous  drainage catheter previously noted in the right midabdomen.  5. Stable hepatosplenomegaly.  6. Stable appearance of the left kidney demonstrating dilatation of the left renal pelvis versus a  parapelvic cyst and small 1 cm left cortical cyst. 7. Slightly decreasing subcutaneous edema      CXR 1/1: mild improvement in congestion and/or infiltrates     CT c/a/p w/ IV and green dye contrast 12/27: s/p gastrectomy and gastrojejunal anastomosis with a stent in place. 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  A few rim-enhancing fluid collections in the lower abdomen and pelvis which might be infected.Moderate to large left pleural effusion, enlarged since prior study. Small right pleural effusion. Extensive atelectatic changes in the lungs,   left greater than right.    CT c/a/p 12/21: 1.  Interval removal of endoluminal stent from the distal esophagus   extending to the jejunal loop. Interval placement of an esophageal stent. 2.  Slight interval decrease in small to moderate ascites, particularly   in the left upper quadrant surrounding the anastomosis. The largest pocket measures 1.3 x 6.6 cm in the midline upper pelvis, similar to the   prior study.3.  Further interval decrease in free air, with a a few punctate foci   remaining.4.  Slight interval decrease in small right pleural effusion. No change   in small left pleural effusion. Associated atelectasis bilaterally,   unchanged.5.  New anterior abdominal wall defect at the surgical incision site, presumably from interval debridement. 6.  Mild cardiomegaly and trace pericardial effusion, unchanged.  7.  Diffuse anasarca.        CXR 12/25: Improvement of opacification left hemithorax in comparison to   prior examination of the chest 12/25/2016. Bilateral effusions.   Underlying infiltrates cannot be excluded    Assessment and Plan:   · Assessment		  56 year old critical patient s/p SIPs procedure with free air concerning for anastomotic leak s/p emergent ex lap with abdominal washout found to have leak of esophagojejunostomy which they could not repair due to its location.  She is s/p esophageal stent placement, peritoneal washout and internal double pigtail stent placement. During endoscopy second defect noted on enteric side of anastomosis. Repeat endoscopy removed stent and placed a second stent over entire 5 cm anastomotic defect. 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 She has had repeated bronchoscopies with 12/13 and 12/24 bronchial wash growing MDR pseudomonas. She had a pigtail placed, roughly 600cc fluid removed and chest tube removed. She had a repeat CT scan which showed persistent fluid collections (abdominal abscesses) and collapsed lung b/l. We are concerned of two sites of infection - lung (XDR Pseudomonas) and intrabdominal collections (polymicrobial- Pseudomonas, yeast, strep milleri in previous cultures), the latter which was drained by IR on 1/6 with CHECO in place and await cultures. She was also found to be Cdiff positive 1/7 and was started on enteral vanc (1/7-). We recommend:   -Treat XDR pseudomonas with colistin (low dose) and synergistic effect with meropenem (which also has GNR +strep milleri coverage) and additional IV aminoglycoside/Tobramycin (for systemic infection) as well as nebulized aminoglycoside Tobramycin for lung infection. Await culture sensitivities from abdominal drainage by IR - this may take longer than usual cultures as she has been on long duration of antibiotics. Will call micro to hold cultures for 2 weeks.   -Continue with enteral vanc 250 q6 for Cdiff   -Can DC Micafungin today (despite previous cx of yeast, no speciation of yeast on previous abdominal 12/11 cx)  -Please call with any questions. Will continue to follow.

## 2017-01-10 NOTE — PROGRESS NOTE ADULT - ASSESSMENT
56YOF s/p bypass revision following failed SIPS (anastamotic leak), found to have draining intra-abbdominal abscess, multiple bronchs for mucous plugging    Neuro: Dilaudid PRN, Tylenol PRN, Lexapro 20mg daily  CVS: MAP > 70, normotensive goals    Pulm: Trach collar, cuff deflated.  Peridex. Duonebs. Mucomyst.  FEN/GI: J-tube TF 50cc/hr osmolite 1.2. Protonix. Zofran PRN. Vit C, MVT.  Zinc sulfate.  Multivitamin.  Rectal tube (stg III ulcer)  : stone (stg III sacral ulcer)  Endo: ISS.   ID: C. diff: Vancomycin PO (1/7) MDR Pseudomonas (sputum and peritoneal fluid) - micafungin (12/31- ), colistin (12/31- ), meropenem (12/31- ), INH tobramycin (12/31-- ) \\\ DC: linezolid (1/7-1/9) IV tobra (1/3, 1/4, 1/6), gent (12/26-) Imipenem (12/14--), Vanc (12/13-14), Zosyn (12/3-14), Linezolid (12/24-25),  fluconazole (12/4-25), - H. simplex Acyclovir (12/20-25)   Heme: SCDs, lovenox 80 bid,   Lines/drains: L flank CHECO (1/6-). R PICC (12/21-) to be changed for new PICC ///dc: Left IJ TLC (12/3--), left IJ Juanjo (12/6-12/14). Feeding J-tube.  Wounds: Midline abdominal wound - wound vac change q48 hours, last 1/9/17  Benadryl PRN for itching. 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  Hospital course complicated by draining intra-abdominal abscess, resolving PNA with +MDR pseudomonas, +c diff, R subclavian DVT, stage III sacral DTI    Neuro: Pain currently controlled with IV Dilaudid ~5.5 mg/day; transition to Percocet 2q6 standing, Dilaudid PRN for breakthrough. On Lexapro 20mg daily for preexisting depression.  CVS: MAP > 70, normotensive goals    Pulm: Currently tolerating trach collar, cuff deflated; plan to downsize to 6. Peridex. Duonebs.   FEN/GI: J-tube TF 53cc/hr Osmolite 1.2. Protonix. Zofran PRN. Vit C, MVT.  Zinc sulfate.  Multivitamin.  Rectal tube (stg III ulcer)  : Yu (stg III sacral ulcer)  Endo: ISS.   ID: C. diff: Vancomycin PO (1/7) MDR Pseudomonas (sputum and peritoneal fluid) - micafungin (12/31- ), colistin (12/31- ), meropenem (12/31- ), INH tobramycin (12/31-- ) \\\ DC: linezolid (1/7-1/9) IV tobra (1/3, 1/4, 1/6), gent (12/26-) Imipenem (12/14--), Vanc (12/13-14), Zosyn (12/3-14), Linezolid (12/24-25),  fluconazole (12/4-25), - H. simplex Acyclovir (12/20-25)   Heme: SCDs, lovenox 80 bid,   Lines/drains: L flank CHECO (1/6-), Feeding J-tube. R PICC (12/21-) to be changed for new PICC ///dc: Left IJ TLC (12/3--), left IJ Juanjo (12/6-12/14).    Wounds: Midline abdominal wound, change wound vac q48 hrs (last 1/9)   Benadryl PRN for itching. 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  Hospital course complicated by draining intra-abdominal abscess, resolving PNA with +MDR pseudomonas, +c diff, R subclavian DVT, stage III sacral Decubitus    Neuro: Pain currently controlled with IV Dilaudid ~5.5 mg/day; transition to Percocet 2q6 standing, Dilaudid PRN for breakthrough. On Lexapro 20mg daily for preexisting depression.  CVS: MAP > 70, normotensive goals    Pulm: Currently tolerating trach collar, cuff deflated; plan to downsize to 6. Peridex. Duonebs.   FEN/GI: J-tube TF 53cc/hr Osmolite 1.2. Protonix. Zofran PRN. Vit C, MVT.  Zinc sulfate.  Multivitamin.  Rectal tube (stg III ulcer)  : Yu (stg III sacral ulcer)  Endo: ISS.   ID: C. diff: Vancomycin PO (1/7) MDR Pseudomonas (sputum and peritoneal fluid) - micafungin (12/31-1/10 ), colistin (12/31- ), meropenem (12/31- ), INH tobramycin (12/31-- ) \\\ DC: linezolid (1/7-1/9) IV tobra (1/3, 1/4, 1/6), gent (12/26-) Imipenem (12/14--), Vanc (12/13-14), Zosyn (12/3-14), Linezolid (12/24-25),  fluconazole (12/4-25), - H. simplex Acyclovir (12/20-25). DCing micafungin today.  Heme: SCDs, lovenox 80 bid,   Lines/drains: L flank CHECO (1/6-), Feeding J-tube. R PICC (12/21-) to be changed for new PICC ///dc: Left IJ TLC (12/3--), left IJ Juanjo (12/6-12/14).    Wounds: Midline abdominal wound, change wound vac q48 hrs (last 1/9)   Benadryl PRN for itching.

## 2017-01-10 NOTE — PROGRESS NOTE ADULT - SUBJECTIVE AND OBJECTIVE BOX
S: Patient seen at bedside for AM SICU rounds.  No acute events overnight; tolerating trach collar without issue, still tachypneic with RR to 30s, which seems to be baseline for this patient.  Denies SOB/CP/lightheadedness.  No nausea/vomiting.      O: ICU Vital Signs Last 24 Hrs  T(F): 100, Max: 100 (01-10 @ 05:32)  HR: 100 (92 - 110)  BP: 150/75 (124/66 - 170/80)  BP(mean): 108 (87 - 119)  RR: 30 (26 - 36)  SpO2: 99% (93% - 100%)    PHYSICAL EXAM:     Neurological: AAOx3, NAD  Cardiovascular: RRR, no MRG  Respiratory: CTAB, no adventitious sounds, distant lung sounds at bases, equal bilaterally  Gastrointestinal: soft, NT, ND, BS+.  Feeding J tube in place.  Midline wound vac in place, C/D/I, output 20cc overnight.  L CHECO drain with 20cc cloudy fluid overnight.    Extremities: warm, no dependent edema.  SCDs in place. Peripheral pulses appreciated bilaterally  Vascular: no cyanosis/erythema.      LABS:    10 Jorge 2017 04:44    138    |  102    |  13     ----------------------------<  102    4.4     |  28     |  0.36     Ca    9.6        10 Jorge 2017 04:44  Phos  2.7       10 Jorge 2017 04:44  Mg     1.6       10 Jorge 2017 04:44                          7.1    7.5   )-----------( 256      ( 10 Jorge 2017 04:44 )             23.4     CAPILLARY BLOOD GLUCOSE  102 (10 Jorge 2017 07:00)  78 (09 Jan 2017 16:00)  97 (09 Jan 2017 12:00)    MEDICATIONS  (STANDING):  pantoprazole  Injectable 40milliGRAM(s) IV Push daily  insulin lispro (HumaLOG) corrective regimen sliding scale  SubCutaneous Before meals and at bedtime  heparin  flush 100 Units/mL Injectable 100Unit(s) IV Push every other day  ALBUTerol/ipratropium for Nebulization 3milliLiter(s) Nebulizer every 6 hours  sodium chloride 0.9% lock flush 20milliLiter(s) IV Push once  ascorbic acid Syrup 500milliGRAM(s) Oral daily  zinc sulfate 220milliGRAM(s) Oral daily  tobramycin for Nebulization 300milliGRAM(s) Inhalation every 12 hours  colistimethate IVPB 100milliGRAM(s) IV Intermittent every 12 hours  micafungin IVPB  IV Intermittent   micafungin IVPB 100milliGRAM(s) IV Intermittent every 24 hours  multivitamin 1Tablet(s) Oral daily  meropenem IVPB 2000milliGRAM(s) IV Intermittent every 8 hours  cyanocobalamin Injectable 1000MICROGram(s) IntraMuscular every 7 days  cholecalciferol 2000Unit(s) Oral daily  collagenase Ointment 1Application(s) Topical daily  enoxaparin Injectable 80milliGRAM(s) SubCutaneous <User Schedule>  vancomycin    Solution 250milliGRAM(s) Oral every 6 hours  magnesium sulfate  IVPB 2Gram(s) IV Intermittent once  potassium acid phosphate/sodium acid phosphate tablet (K-PHOS No. 2) 2Tablet(s) Oral once    MEDICATIONS  (PRN):  acetaminophen    Suspension. 650milliGRAM(s) Oral every 6 hours PRN Mild Pain (1 - 3)  sodium chloride 0.9% lock flush 10milliLiter(s) IV Push every 1 hour PRN After each medication administration  sodium chloride 0.9% lock flush 10milliLiter(s) IV Push every 12 hours PRN Lumen of catheter NOT used  acetaminophen    Suspension 650milliGRAM(s) Enteral Tube every 6 hours PRN For Temp greater than 38 C (100.4 F)  ondansetron Injectable 4milliGRAM(s) IV Push every 6 hours PRN Nausea and/or Vomiting  diphenhydrAMINE   Injectable 25milliGRAM(s) IntraMuscular every 4 hours PRN Rash and/or Itching  HYDROmorphone  Injectable 1milliGRAM(s) IV Push every 4 hours PRN Severe Pain (7 - 10)  HYDROmorphone  Injectable 0.5milliGRAM(s) IV Push every 4 hours PRN Moderate Pain (4 - 6)      Yu:	  [ ] None	[x ] Daily Yu Order Placed	   Indication:	  [ x] Strict I and O's    [ ] Obstruction     [x ] Incontinence + Stage 3 or 4 Decubitus  Central Line:  [x ] None	   [ ]  Medication / TPN Administration     [ ] No Peripheral IV S: Patient seen at bedside for AM SICU rounds.  No acute events overnight; tolerating trach collar without issue, still tachypneic with RR to 30s, which seems to be baseline for this patient.  Denies SOB/CP/lightheadedness.  No nausea/vomiting.      O: ICU Vital Signs Last 24 Hrs  T(F): 100, Max: 100 (01-10 @ 05:32)  HR: 100 (92 - 110)  BP: 150/75 (124/66 - 170/80)  BP(mean): 108 (87 - 119)  RR: 30 (26 - 36)  SpO2: 99% (93% - 100%)    PHYSICAL EXAM:     Neurological: AAOx3, NAD  Skin: stage 3 decubitus of sacrum, clean  HEENT: PERRL, EOMI, no thrush, MMM  Cardiovascular: RRR, no MRG  Respiratory: CTAB, no adventitious sounds, distant lung sounds at bases, equal bilaterally  Gastrointestinal: soft, NT, ND, BS+.  Feeding J tube in place.  Midline wound vac in place, C/D/I, output 20cc overnight.  L CHECO drain with 20cc cloudy fluid overnight.    Extremities: warm, no dependent edema.  SCDs in place. Peripheral pulses appreciated bilaterally  Vascular/MSK: no cyanosis/erythema/ clubbing. 2+ pulses.      LABS:    10 Jorge 2017 04:44    138    |  102    |  13     ----------------------------<  102    4.4     |  28     |  0.36     Ca    9.6        10 Jorge 2017 04:44  Phos  2.7       10 Jorge 2017 04:44  Mg     1.6       10 Jorge 2017 04:44                          7.1    7.5   )-----------( 256      ( 10 Jorge 2017 04:44 )             23.4     CAPILLARY BLOOD GLUCOSE  102 (10 Jorge 2017 07:00)  78 (09 Jan 2017 16:00)  97 (09 Jan 2017 12:00)    MEDICATIONS  (STANDING):  pantoprazole  Injectable 40milliGRAM(s) IV Push daily  insulin lispro (HumaLOG) corrective regimen sliding scale  SubCutaneous Before meals and at bedtime  heparin  flush 100 Units/mL Injectable 100Unit(s) IV Push every other day  ALBUTerol/ipratropium for Nebulization 3milliLiter(s) Nebulizer every 6 hours  sodium chloride 0.9% lock flush 20milliLiter(s) IV Push once  ascorbic acid Syrup 500milliGRAM(s) Oral daily  zinc sulfate 220milliGRAM(s) Oral daily  tobramycin for Nebulization 300milliGRAM(s) Inhalation every 12 hours  colistimethate IVPB 100milliGRAM(s) IV Intermittent every 12 hours  micafungin IVPB  IV Intermittent   micafungin IVPB 100milliGRAM(s) IV Intermittent every 24 hours  multivitamin 1Tablet(s) Oral daily  meropenem IVPB 2000milliGRAM(s) IV Intermittent every 8 hours  cyanocobalamin Injectable 1000MICROGram(s) IntraMuscular every 7 days  cholecalciferol 2000Unit(s) Oral daily  collagenase Ointment 1Application(s) Topical daily  enoxaparin Injectable 80milliGRAM(s) SubCutaneous <User Schedule>  vancomycin    Solution 250milliGRAM(s) Oral every 6 hours  magnesium sulfate  IVPB 2Gram(s) IV Intermittent once  potassium acid phosphate/sodium acid phosphate tablet (K-PHOS No. 2) 2Tablet(s) Oral once    MEDICATIONS  (PRN):  acetaminophen    Suspension. 650milliGRAM(s) Oral every 6 hours PRN Mild Pain (1 - 3)  sodium chloride 0.9% lock flush 10milliLiter(s) IV Push every 1 hour PRN After each medication administration  sodium chloride 0.9% lock flush 10milliLiter(s) IV Push every 12 hours PRN Lumen of catheter NOT used  acetaminophen    Suspension 650milliGRAM(s) Enteral Tube every 6 hours PRN For Temp greater than 38 C (100.4 F)  ondansetron Injectable 4milliGRAM(s) IV Push every 6 hours PRN Nausea and/or Vomiting  diphenhydrAMINE   Injectable 25milliGRAM(s) IntraMuscular every 4 hours PRN Rash and/or Itching  HYDROmorphone  Injectable 1milliGRAM(s) IV Push every 4 hours PRN Severe Pain (7 - 10)  HYDROmorphone  Injectable 0.5milliGRAM(s) IV Push every 4 hours PRN Moderate Pain (4 - 6)      Yu:	  [ ] None	[x ] Daily Yu Order Placed	   Indication:	  [ x] Strict I and O's    [ ] Obstruction     [x ] Incontinence + Stage 3 or 4 Decubitus  Central Line:  [x ] None	   [ ]  Medication / TPN Administration     [ ] No Peripheral IV

## 2017-01-11 NOTE — PROGRESS NOTE ADULT - SUBJECTIVE AND OBJECTIVE BOX
Status post Abdominal drainage catheter placement. Easily flushed with normal saline. Output noted. Will continue to follow.

## 2017-01-11 NOTE — SPEAKING VALVE EVALUATION - DIAGNOSTIC IMPRESSIONS
Pt was not able to produce voicing with digital occlusion or with PMV in place despite multiple trials and adequate respiratory effort.

## 2017-01-11 NOTE — PROGRESS NOTE ADULT - ASSESSMENT
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  Hospital course complicated by draining intra-abdominal abscess, resolving PNA with +MDR pseudomonas, +c diff, R subclavian DVT, stage III sacral Decubitus    S/P IR drainage of abdominal collection; for repeat CT, f/u results to determine whether re-drainage is required.    Neuro: Pain currently controlled with standing Percocet 2q6, Dilaudid PRN for breakthrough. On Lexapro 20mg daily for preexisting depression.  CVS: MAP > 70, hemodynamically stable   Pulm: Trach collar downsized to size 6 today; speech evaluation for Passy Silvia  On peridex, duonebs.   FEN/GI: J-tube TF 53cc/hr Osmolite 1.2. Protonix. Zofran PRN. Vit C, MVT.  Zinc sulfate.  Multivitamin.  Rectal tube (stg III ulcer)  : Yu (stg III sacral ulcer)  Endo: ISS.   ID: C. diff: Vancomycin PO (1/7) MDR Pseudomonas (sputum and peritoneal fluid) - micafungin (12/31-1/10 ), colistin (12/31- ), meropenem (12/31- ), INH tobramycin (12/31-- ) \\\ DC: linezolid (1/7-1/9) IV tobra (1/3, 1/4, 1/6), gent (12/26-) Imipenem (12/14--), Vanc (12/13-14), Zosyn (12/3-14), Linezolid (12/24-25),  fluconazole (12/4-25), - H. simplex Acyclovir (12/20-25). Micafingun (12/31-1/10).  Heme: SCDs, lovenox 80 bid  Lines/drains: L flank CHECO (1/6-), Feeding J-tube. ///dc: R PICC (12/21-1/10) Left IJ TLC (12/3--), left IJ Juanjo (12/6-12/14).    Wounds: Midline abdominal wound, change wound vac q48 hrs (last 1/11)   Benadryl PRN for itching. 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  Hospital course complicated by draining intra-abdominal abscess, resolving PNA with +MDR pseudomonas, +c diff, R subclavian DVT, stage III sacral Decubitus    S/P IR drainage of abdominal collection; for repeat CT, f/u results to determine whether re-drainage is required.    Neuro: Pain currently controlled with standing Percocet 2q6, Dilaudid PRN for breakthrough. On Lexapro 20mg daily for preexisting depression.  CVS: MAP > 70, hemodynamically stable   Pulm: Trach collar downsized to size 6 today (pt tolerated this well); speech evaluation for Passy Silvia  On peridex, duonebs.   FEN/GI: J-tube TF 53cc/hr Osmolite 1.2. Protonix. Zofran PRN. Vit C, MVT.  Zinc sulfate.  Multivitamin.  Rectal tube (stg III ulcer)  : Yu (stg III sacral ulcer)  Endo: ISS.   ID: C. diff: Vancomycin PO (1/7) MDR Pseudomonas (sputum and peritoneal fluid) - micafungin (12/31-1/10 ), colistin (12/31- ), meropenem (12/31- ), INH tobramycin (12/31-- ) \\\ DC: linezolid (1/7-1/9) IV tobra (1/3, 1/4, 1/6), gent (12/26-) Imipenem (12/14--), Vanc (12/13-14), Zosyn (12/3-14), Linezolid (12/24-25),  fluconazole (12/4-25), - H. simplex Acyclovir (12/20-25). Micafingun (12/31-1/10).  Heme: SCDs, lovenox 80 bid  Lines/drains: L flank CHECO (1/6-), Feeding J-tube. ///dc: R PICC (12/21-1/10) Left IJ TLC (12/3--), left IJ Juanjo (12/6-12/14).    Wounds: Midline abdominal wound, change wound vac q48 hrs (last 1/11)   Benadryl PRN for itching.

## 2017-01-11 NOTE — PROGRESS NOTE ADULT - SUBJECTIVE AND OBJECTIVE BOX
andrzej is really improving and has been able to stand   low output from vac  wound healing  downsizing the trach  wants her cell phone

## 2017-01-11 NOTE — PROGRESS NOTE ADULT - SUBJECTIVE AND OBJECTIVE BOX
S: Pt seen at bedside for AM SICU rounds.  No acute overnight.  Responded well to new pain regimen of PO percocet, did not require any additional coverage for breakthrough.  Febrile to 100.8 at 1am, afebrile since that time.  Denies subjective fever/chills, no chest pain/difficulty breathing.      Trach downsized to 6; tolerating well.    O: ICU Vital Signs Last 24 Hrs  T(F): 98.4, Max: 100.8 (01-11 @ 01:00)  HR: 90 (86 - 998)  BP: 148/79 (113/74 - 172/103)  BP(mean): 112 (84 - 149)  RR: 38 (10 - 41)  SpO2: 100% (94% - 959%)    PHYSICAL EXAM:     General: stable, NAD  Cardiovascular: RRR, S1S2, no MRG  Respiratory: CTAB, shallow, no adventitious sounds  Gastrointestinal: soft, NT, ND, BS+.  3-4 cm area of induration with central fluctuance, erythematous, nontender.  Wound vac in place, 50cc output overnight.  Feeding J tube in place.  L flank CHECO with 10 cc cloudy fluid overnight.    Extremities: warm, no dependent edema, SCDs in place  Vascular: no cyanosis/erythema    LABS:  no new labs this AM, previously stable    CAPILLARY BLOOD GLUCOSE    MEDICATIONS  (STANDING):  pantoprazole  Injectable 40milliGRAM(s) IV Push daily  insulin lispro (HumaLOG) corrective regimen sliding scale  SubCutaneous Before meals and at bedtime  ALBUTerol/ipratropium for Nebulization 3milliLiter(s) Nebulizer every 6 hours  ascorbic acid Syrup 500milliGRAM(s) Oral daily  zinc sulfate 220milliGRAM(s) Oral daily  tobramycin for Nebulization 300milliGRAM(s) Inhalation every 12 hours  colistimethate IVPB 100milliGRAM(s) IV Intermittent every 12 hours  multivitamin 1Tablet(s) Oral daily  meropenem IVPB 2000milliGRAM(s) IV Intermittent every 8 hours  cyanocobalamin Injectable 1000MICROGram(s) IntraMuscular every 7 days  cholecalciferol 2000Unit(s) Oral daily  collagenase Ointment 1Application(s) Topical daily  enoxaparin Injectable 80milliGRAM(s) SubCutaneous <User Schedule>  vancomycin    Solution 250milliGRAM(s) Oral every 6 hours    MEDICATIONS  (PRN):  acetaminophen    Suspension 650milliGRAM(s) Enteral Tube every 6 hours PRN For Temp greater than 38 C (100.4 F)  ondansetron Injectable 4milliGRAM(s) IV Push every 6 hours PRN Nausea and/or Vomiting  diphenhydrAMINE   Injectable 25milliGRAM(s) IntraMuscular every 4 hours PRN Rash and/or Itching  oxyCODONE  5 mG/acetaminophen 325 mG 1Tablet(s) Oral every 4 hours PRN Moderate Pain (4 - 6)  oxyCODONE  5 mG/acetaminophen 325 mG 2Tablet(s) Oral every 4 hours PRN Severe Pain (7 - 10)      Yu:	  [ ] None	[x ] Daily Yu Order Placed	   Indication:	  [ ] Strict I and O's    [ ] Obstruction     [x ] Incontinence + Stage 3 or 4 Decubitus  Central Line:  [x ] None	   [ ]  Medication / TPN Administration     [ ] No Peripheral IV Events: Febrile to 100.8 at 1am, afebrile since that time. Trach downsized to 6 in AM; tolerating well.    S: Responded well to new pain regimen of PO percocet, did not require any additional coverage for breakthrough.   Denies subjective fever/chills, no chest pain/difficulty breathing.        O: ICU Vital Signs Last 24 Hrs  T(F): 98.4, Max: 100.8 (01-11 @ 01:00)  HR: 90 (86 - 998)  BP: 148/79 (113/74 - 172/103)  BP(mean): 112 (84 - 149)  RR: 38 (10 - 41)  SpO2: 100% (94% - 959%)    PHYSICAL EXAM:     General: stable, NAD  Cardiovascular: RRR, S1S2, no MRG  Respiratory: CTAB, shallow, no adventitious sounds  Gastrointestinal: soft, NT, ND, BS+.  3-4 cm area of induration with central fluctuance, erythematous, nontender.  Wound vac in place, 50cc output overnight.  Feeding J tube in place.  L flank CHECO with 10 cc cloudy fluid overnight.    Extremities: warm, no dependent edema, SCDs in place  Vascular: no cyanosis/erythema    LABS:  no new labs this AM, previously stable    CAPILLARY BLOOD GLUCOSE    MEDICATIONS  (STANDING):  pantoprazole  Injectable 40milliGRAM(s) IV Push daily  insulin lispro (HumaLOG) corrective regimen sliding scale  SubCutaneous Before meals and at bedtime  ALBUTerol/ipratropium for Nebulization 3milliLiter(s) Nebulizer every 6 hours  ascorbic acid Syrup 500milliGRAM(s) Oral daily  zinc sulfate 220milliGRAM(s) Oral daily  tobramycin for Nebulization 300milliGRAM(s) Inhalation every 12 hours  colistimethate IVPB 100milliGRAM(s) IV Intermittent every 12 hours  multivitamin 1Tablet(s) Oral daily  meropenem IVPB 2000milliGRAM(s) IV Intermittent every 8 hours  cyanocobalamin Injectable 1000MICROGram(s) IntraMuscular every 7 days  cholecalciferol 2000Unit(s) Oral daily  collagenase Ointment 1Application(s) Topical daily  enoxaparin Injectable 80milliGRAM(s) SubCutaneous <User Schedule>  vancomycin    Solution 250milliGRAM(s) Oral every 6 hours    MEDICATIONS  (PRN):  acetaminophen    Suspension 650milliGRAM(s) Enteral Tube every 6 hours PRN For Temp greater than 38 C (100.4 F)  ondansetron Injectable 4milliGRAM(s) IV Push every 6 hours PRN Nausea and/or Vomiting  diphenhydrAMINE   Injectable 25milliGRAM(s) IntraMuscular every 4 hours PRN Rash and/or Itching  oxyCODONE  5 mG/acetaminophen 325 mG 1Tablet(s) Oral every 4 hours PRN Moderate Pain (4 - 6)  oxyCODONE  5 mG/acetaminophen 325 mG 2Tablet(s) Oral every 4 hours PRN Severe Pain (7 - 10)      Yu:	  [ ] None	[x ] Daily Yu Order Placed	   Indication:	  [ ] Strict I and O's    [ ] Obstruction     [x ] Incontinence + Stage 3 or 4 Decubitus  Central Line:  [x ] None	   [ ]  Medication / TPN Administration     [ ] No Peripheral IV Events: Febrile to 100.8 at 1am, afebrile since that time. Trach downsized to 6 in AM; tolerating well.    S: Responded well to new pain regimen of PO percocet, did not require any additional coverage for breakthrough.   Denies subjective fever/chills, no chest pain/difficulty breathing.        O: ICU Vital Signs Last 24 Hrs  T(F): 98.4, Max: 100.8 (01-11 @ 01:00)  HR: 90 (86 - 998)  BP: 148/79 (113/74 - 172/103)  BP(mean): 112 (84 - 149)  RR: 38 (10 - 41)  SpO2: 100% (94% - 959%)    PHYSICAL EXAM:     General: stable, NAD  Skin: no rash  Cardiovascular: RRR, S1S2, no MRG  Respiratory: CTAB, shallow, no adventitious sounds  Gastrointestinal: soft, NT, ND, BS+.  3-4 cm area of induration with central fluctuance, erythematous, nontender.  Wound vac in place, 50cc output overnight.  Feeding J tube in place.  L flank CHECO with 10 cc cloudy fluid overnight.    Extremities: warm, no dependent edema, SCDs in place  Vascular/MSK: no cyanosis/erythema/clubbing. 2+pulses    LABS:  no new labs this AM, previously stable  CXR haziness on right with effusion    CAPILLARY BLOOD GLUCOSE    MEDICATIONS  (STANDING):  pantoprazole  Injectable 40milliGRAM(s) IV Push daily  insulin lispro (HumaLOG) corrective regimen sliding scale  SubCutaneous Before meals and at bedtime  ALBUTerol/ipratropium for Nebulization 3milliLiter(s) Nebulizer every 6 hours  ascorbic acid Syrup 500milliGRAM(s) Oral daily  zinc sulfate 220milliGRAM(s) Oral daily  tobramycin for Nebulization 300milliGRAM(s) Inhalation every 12 hours  colistimethate IVPB 100milliGRAM(s) IV Intermittent every 12 hours  multivitamin 1Tablet(s) Oral daily  meropenem IVPB 2000milliGRAM(s) IV Intermittent every 8 hours  cyanocobalamin Injectable 1000MICROGram(s) IntraMuscular every 7 days  cholecalciferol 2000Unit(s) Oral daily  collagenase Ointment 1Application(s) Topical daily  enoxaparin Injectable 80milliGRAM(s) SubCutaneous <User Schedule>  vancomycin    Solution 250milliGRAM(s) Oral every 6 hours    MEDICATIONS  (PRN):  acetaminophen    Suspension 650milliGRAM(s) Enteral Tube every 6 hours PRN For Temp greater than 38 C (100.4 F)  ondansetron Injectable 4milliGRAM(s) IV Push every 6 hours PRN Nausea and/or Vomiting  diphenhydrAMINE   Injectable 25milliGRAM(s) IntraMuscular every 4 hours PRN Rash and/or Itching  oxyCODONE  5 mG/acetaminophen 325 mG 1Tablet(s) Oral every 4 hours PRN Moderate Pain (4 - 6)  oxyCODONE  5 mG/acetaminophen 325 mG 2Tablet(s) Oral every 4 hours PRN Severe Pain (7 - 10)      Yu:	  [ ] None	[x ] Daily Yu Order Placed	   Indication:	  [ ] Strict I and O's    [ ] Obstruction     [x ] Incontinence + Stage 3 or 4 Decubitus  Central Line:  [x ] None	   [ ]  Medication / TPN Administration     [ ] No Peripheral IV

## 2017-01-12 NOTE — PROGRESS NOTE ADULT - ASSESSMENT
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  Hospital course complicated by draining intra-abdominal abscess, resolving PNA with +MDR pseudomonas, +c diff, R subclavian DVT, stage III sacral decubitus ulcer.    S/P IR drainage of abdominal collection; for repeat CT today.    Neuro: Pain currently controlled with standing Percocet 2q6, Dilaudid PRN for breakthrough. On Lexapro 20mg daily for preexisting depression.  CVS: MAP > 70, hemodynamically stable   Pulm: Trach collar in place, respiratory status stable, though failed Passy [place] evaluation.  Reattempt today, consider ENT consult if relevant.  On peridex, duonebs.   FEN/GI: Tube feeds held for CT. Protonix. Zofran PRN. Vit C, MVT.  Zinc sulfate.  Multivitamin.  Rectal tube in place for stage III decubitus ulcer.  : Yu  Endo: ISS.   ID: C. diff: Vancomycin PO (1/7) MDR Pseudomonas (sputum and peritoneal fluid) - micafungin (12/31-1/10 ), colistin (12/31- ), meropenem (12/31- ), INH tobramycin (12/31-- ) \\\ DC: linezolid (1/7-1/9) IV tobra (1/3, 1/4, 1/6), gent (12/26-) Imipenem (12/14--), Vanc (12/13-14), Zosyn (12/3-14), Linezolid (12/24-25),  fluconazole (12/4-25), - H. simplex Acyclovir (12/20-25). Micafingun (12/31-1/10).  Heme: SCDs, lovenox 80 bid  Lines/drains: L flank CHECO (1/6-), Feeding J-tube. PIV. ///dc: R PICC (12/21-1/10) Left IJ TLC (12/3--), left IJ Juanjo (12/6-12/14).    Wounds: Midline abdominal wound, change wound vac q48 hrs (last 1/11)   Benadryl PRN for itching. 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  Hospital course complicated by draining intra-abdominal abscess, resolving PNA with +MDR pseudomonas, +c diff, R subclavian DVT, stage III sacral decubitus ulcer.    S/P IR drainage of abdominal collection; for repeat CT today.    Neuro: Pain currently controlled with standing Percocet 2q6, Dilaudid PRN for breakthrough. On Lexapro 20mg daily for preexisting depression.  CVS: MAP > 70, hemodynamically stable   Pulm: Trach collar in place, respiratory status stable, though failed Passy [place] evaluation.  Reattempt today, to obtain ENT consult to evaluate vocal cords and airway.  On peridex, duonebs.   FEN/GI: Tube feeds held for CT. Protonix. Zofran PRN. Vit C, MVT.  Zinc sulfate.  Multivitamin.  Rectal tube in place for stage III decubitus ulcer.  : Yu  Endo: ISS.   ID: C. diff: Vancomycin PO (1/7) MDR Pseudomonas (sputum and peritoneal fluid) - micafungin (12/31-1/10 ), colistin (12/31- ), meropenem (12/31- ), INH tobramycin (12/31-- ) \\\ DC: linezolid (1/7-1/9) IV tobra (1/3, 1/4, 1/6), gent (12/26-) Imipenem (12/14--), Vanc (12/13-14), Zosyn (12/3-14), Linezolid (12/24-25),  fluconazole (12/4-25), - H. simplex Acyclovir (12/20-25). Micafingun (12/31-1/10).  Heme: SCDs, lovenox 80 bid  Lines/drains: L flank CHECO (1/6-), Feeding J-tube. PIV. ///dc: R PICC (12/21-1/10) Left IJ TLC (12/3--), left IJ Juanjo (12/6-12/14).    Wounds: Midline abdominal wound, change wound vac q48 hrs (last 1/11)   Benadryl PRN for itching.

## 2017-01-12 NOTE — PROGRESS NOTE ADULT - ASSESSMENT
56YOF s/p bypass revision following failed SIPS (anastamotic leak), found to have draining intra-abbdominal abscess, with resolving respiratory failure, pseudomonas pna. ATN    - Continue SICU level of care  - CT scan today  - Sp/Sw study   - Evaluate for passymuir valve

## 2017-01-12 NOTE — PROGRESS NOTE ADULT - SUBJECTIVE AND OBJECTIVE BOX
Interval events:  No acute events.  Evaluated for Passy Silvia yesterday evening, failed several attempts at vocalization.  Per S+S team recommended possible ENT consult to assess laryngeal/vocal folds. Febrile to 100.8 overnight at 1am, now afebrile.      S: Pt seen at bedside.  Feels well this morning, no complaints at this time.  Pain has been adequately controlled with percocet.  Denies chest pain/dyspnea/SOB/N/V/chills.  Does note some itchiness at site of previous drain, though denies any tenderness or pain there.  Tube feeds held for CT.    O: ICU Vital Signs Last 24 Hrs  T(F): 98.9, Max: 100.8 (01-12 @ 00:33)  HR: 88 (84 - 108)  BP: 156/79 (113/65 - 172/103)  BP(mean): 113 (84 - 149)  RR: 33 (18 - 41)  SpO2: 100% (93% - 100%)    PHYSICAL EXAM:     General: stable, NAD  Skin: no rash  Cardiovascular: RRR, S1S2, 2/6 early systolic murmur at aortic area  Respiratory: rhonchorous breath sounds on right, occasional inspiratory crackles, decreased lung sounds over bases bilaterally  Gastrointestinal: +BS, soft, nondistended, nontender to palpation, no rebound or guarding.  Still with 3-4 cm area of induration at previous drain site, more erythematous than previous exam, with central darkening; nontender.  Previous L drain site with some milky discharge, soaked through gauze overnight.  Midline wound vac, feeding J tube, L flank CHECO with scant cloudy fluid    Extremities: warm and well perfused, no dependent edema, SCDs  Vascular: no cyanosis/erythema, peripheral pulses appreciated bilaterally     LABS:    12 Jan 2017 05:45    138    |  102    |  16     ----------------------------<  92     4.1     |  29     |  0.29     Ca    9.9        12 Jan 2017 05:45  Phos  3.1       12 Jan 2017 05:45  Mg     1.7       12 Jan 2017 05:45                          7.6    8.2   )-----------( 286      ( 12 Jan 2017 05:45 )             25.4     CAPILLARY BLOOD GLUCOSE  81 (11 Jan 2017 21:00)    MEDICATIONS  (STANDING):  pantoprazole  Injectable 40milliGRAM(s) IV Push daily  insulin lispro (HumaLOG) corrective regimen sliding scale  SubCutaneous Before meals and at bedtime  ALBUTerol/ipratropium for Nebulization 3milliLiter(s) Nebulizer every 6 hours  ascorbic acid Syrup 500milliGRAM(s) Oral daily  zinc sulfate 220milliGRAM(s) Oral daily  tobramycin for Nebulization 300milliGRAM(s) Inhalation every 12 hours  colistimethate IVPB 100milliGRAM(s) IV Intermittent every 12 hours  multivitamin 1Tablet(s) Oral daily  meropenem IVPB 2000milliGRAM(s) IV Intermittent every 8 hours  cyanocobalamin Injectable 1000MICROGram(s) IntraMuscular every 7 days  cholecalciferol 2000Unit(s) Oral daily  collagenase Ointment 1Application(s) Topical daily  enoxaparin Injectable 80milliGRAM(s) SubCutaneous <User Schedule>  vancomycin    Solution 250milliGRAM(s) Oral every 6 hours    MEDICATIONS  (PRN):  acetaminophen    Suspension 650milliGRAM(s) Enteral Tube every 6 hours PRN For Temp greater than 38 C (100.4 F)  ondansetron Injectable 4milliGRAM(s) IV Push every 6 hours PRN Nausea and/or Vomiting  diphenhydrAMINE   Injectable 25milliGRAM(s) IntraMuscular every 4 hours PRN Rash and/or Itching  oxyCODONE  5 mG/acetaminophen 325 mG 1Tablet(s) Oral every 4 hours PRN Moderate Pain (4 - 6)  oxyCODONE  5 mG/acetaminophen 325 mG 2Tablet(s) Oral every 4 hours PRN Severe Pain (7 - 10)      Yu:	  [ ] None	[x ] Daily Yu Order Placed	   Indication:	  [ ] Strict I and O's    [ ] Obstruction     [x ] Incontinence + Stage 3 or 4 Decubitus  Central Line:  [x ] None	   [ ]  Medication / TPN Administration     [ ] No Peripheral IV Interval events:  No acute events.  Evaluated for Passy Silvia yesterday evening, failed several attempts at vocalization.  Per S+S team recommended possible ENT consult to assess laryngeal/vocal folds. Febrile to 100.8 overnight at 1am, now afebrile.      S: Pt seen at bedside.  Feels well this morning, no complaints at this time.  Pain has been adequately controlled with percocet.  Denies chest pain/dyspnea/SOB/N/V/chills.  Does note some itchiness at site of previous drain, though denies any tenderness or pain there.  Tube feeds held for CT.    O: ICU Vital Signs Last 24 Hrs  T(F): 98.9, Max: 100.8 (01-12 @ 00:33)  HR: 88 (84 - 108)  BP: 156/79 (113/65 - 172/103)  BP(mean): 113 (84 - 149)  RR: 33 (18 - 41)  SpO2: 100% (93% - 100%)    PHYSICAL EXAM:     General: stable, NAD  Skin: no rash  Cardiovascular: RRR, S1S2, 2/6 early systolic murmur at aortic area  Respiratory: rhonchorous breath sounds on right, occasional inspiratory crackles, decreased lung sounds over bases bilaterally  Gastrointestinal: +BS, soft, nondistended, nontender to palpation, no rebound or guarding.  Still with 3-4 cm area of induration at previous drain site, more erythematous than previous exam, with central darkening; nontender.  Previous L drain site with some milky discharge, soaked through gauze overnight.  Midline wound vac, feeding J tube, L flank CHECO with scant cloudy fluid    Extremities: warm and well perfused, no dependent edema, SCDs  Vascular/MSK: no cyanosis/erythema/ joint swelling, 2+ peripheral pulses appreciated bilaterally     LABS:    12 Jan 2017 05:45    138    |  102    |  16     ----------------------------<  92     4.1     |  29     |  0.29     Ca    9.9        12 Jan 2017 05:45  Phos  3.1       12 Jan 2017 05:45  Mg     1.7       12 Jan 2017 05:45                          7.6    8.2   )-----------( 286      ( 12 Jan 2017 05:45 )             25.4     CAPILLARY BLOOD GLUCOSE  81 (11 Jan 2017 21:00)    MEDICATIONS  (STANDING):  pantoprazole  Injectable 40milliGRAM(s) IV Push daily  insulin lispro (HumaLOG) corrective regimen sliding scale  SubCutaneous Before meals and at bedtime  ALBUTerol/ipratropium for Nebulization 3milliLiter(s) Nebulizer every 6 hours  ascorbic acid Syrup 500milliGRAM(s) Oral daily  zinc sulfate 220milliGRAM(s) Oral daily  tobramycin for Nebulization 300milliGRAM(s) Inhalation every 12 hours  colistimethate IVPB 100milliGRAM(s) IV Intermittent every 12 hours  multivitamin 1Tablet(s) Oral daily  meropenem IVPB 2000milliGRAM(s) IV Intermittent every 8 hours  cyanocobalamin Injectable 1000MICROGram(s) IntraMuscular every 7 days  cholecalciferol 2000Unit(s) Oral daily  collagenase Ointment 1Application(s) Topical daily  enoxaparin Injectable 80milliGRAM(s) SubCutaneous <User Schedule>  vancomycin    Solution 250milliGRAM(s) Oral every 6 hours    MEDICATIONS  (PRN):  acetaminophen    Suspension 650milliGRAM(s) Enteral Tube every 6 hours PRN For Temp greater than 38 C (100.4 F)  ondansetron Injectable 4milliGRAM(s) IV Push every 6 hours PRN Nausea and/or Vomiting  diphenhydrAMINE   Injectable 25milliGRAM(s) IntraMuscular every 4 hours PRN Rash and/or Itching  oxyCODONE  5 mG/acetaminophen 325 mG 1Tablet(s) Oral every 4 hours PRN Moderate Pain (4 - 6)  oxyCODONE  5 mG/acetaminophen 325 mG 2Tablet(s) Oral every 4 hours PRN Severe Pain (7 - 10)      Yu:	  [ ] None	[x ] Daily Yu Order Placed	   Indication:	  [ ] Strict I and O's    [ ] Obstruction     [x ] Incontinence + Stage 3 or 4 Decubitus  Central Line:  [x ] None	   [ ]  Medication / TPN Administration     [ ] No Peripheral IV

## 2017-01-12 NOTE — PROGRESS NOTE ADULT - SUBJECTIVE AND OBJECTIVE BOX
STATUS POST:    11/28: [facility]-en-Y reconstruction  12/3: ex lap, abdominal washout, placement of a feeding j tube in common channel distal to Russel anastomosis, and placement of abdominal drains.  Abdominal wound left open with wound vac in place.  Intra-op, patient found to have leak of esophagojejunostomy which they could not repair due to its location.    SUBJECTIVE:  Pt seen and examined on AM rounds with surgery chief resident and Team 2. No issues per nursing staff. Patient trying to communicate, but difficulty w/ tracheostomy.     MEDICATIONS  (STANDING):  pantoprazole  Injectable 40milliGRAM(s) IV Push daily  insulin lispro (HumaLOG) corrective regimen sliding scale  SubCutaneous Before meals and at bedtime  ALBUTerol/ipratropium for Nebulization 3milliLiter(s) Nebulizer every 6 hours  ascorbic acid Syrup 500milliGRAM(s) Oral daily  zinc sulfate 220milliGRAM(s) Oral daily  tobramycin for Nebulization 300milliGRAM(s) Inhalation every 12 hours  colistimethate IVPB 100milliGRAM(s) IV Intermittent every 12 hours  multivitamin 1Tablet(s) Oral daily  meropenem IVPB 2000milliGRAM(s) IV Intermittent every 8 hours  cyanocobalamin Injectable 1000MICROGram(s) IntraMuscular every 7 days  cholecalciferol 2000Unit(s) Oral daily  collagenase Ointment 1Application(s) Topical daily  enoxaparin Injectable 80milliGRAM(s) SubCutaneous <User Schedule>  vancomycin    Solution 250milliGRAM(s) Oral every 6 hours    MEDICATIONS  (PRN):  acetaminophen    Suspension 650milliGRAM(s) Enteral Tube every 6 hours PRN For Temp greater than 38 C (100.4 F)  ondansetron Injectable 4milliGRAM(s) IV Push every 6 hours PRN Nausea and/or Vomiting  diphenhydrAMINE   Injectable 25milliGRAM(s) IntraMuscular every 4 hours PRN Rash and/or Itching  oxyCODONE  5 mG/acetaminophen 325 mG 1Tablet(s) Oral every 4 hours PRN Moderate Pain (4 - 6)  oxyCODONE  5 mG/acetaminophen 325 mG 2Tablet(s) Oral every 4 hours PRN Severe Pain (7 - 10)      Vital Signs Last 24 Hrs  T(C): 37.2, Max: 38.2 (01-12 @ 00:33)  T(F): 98.9, Max: 100.8 (01-12 @ 00:33)  HR: 88 (84 - 108)  BP: 156/79 (113/65 - 172/103)  BP(mean): 113 (84 - 149)  RR: 33 (18 - 41)  SpO2: 100% (93% - 100%)    PHYSICAL EXAM:      Constitutional: A&Ox3    Respiratory: non labored breathing, no respiratory distress    Cardiovascular: NSR, moderate tachycardia    Gastrointestinal: Soft, ND, NT, wound vac in place                 Incision: wound vac in place, erythema around previous CHECO site     Extremities: (-) edema                  I&O's Detail      LABS:                        7.6    8.2   )-----------( 286      ( 12 Jan 2017 05:45 )             25.4     12 Jan 2017 05:45    138    |  102    |  16     ----------------------------<  92     4.1     |  29     |  0.29     Ca    9.9        12 Jan 2017 05:45  Phos  3.1       12 Jan 2017 05:45  Mg     1.7       12 Jan 2017 05:45            RADIOLOGY & ADDITIONAL STUDIES:  CT scan today

## 2017-01-13 NOTE — PROGRESS NOTE ADULT - SUBJECTIVE AND OBJECTIVE BOX
INTERVAL HPI/OVERNIGHT EVENTS:     ROS:       Allergies    sulfa drugs (Unknown)  sulfamethoxazole (Other)    Intolerances        ANTIBIOTICS/RELEVANT:  antimicrobials  tobramycin for Nebulization 300milliGRAM(s) Inhalation every 12 hours  colistimethate IVPB 100milliGRAM(s) IV Intermittent every 12 hours  tobramycin IVPB 150milliGRAM(s) IV Intermittent once  meropenem IVPB 2000milliGRAM(s) IV Intermittent every 8 hours    immunologic:    MEDICATIONS  (STANDING):  pantoprazole  Injectable 40milliGRAM(s) IV Push daily  insulin lispro (HumaLOG) corrective regimen sliding scale  SubCutaneous Before meals and at bedtime  ALBUTerol/ipratropium for Nebulization 3milliLiter(s) Nebulizer every 6 hours  ascorbic acid Syrup 500milliGRAM(s) Oral daily  zinc sulfate 220milliGRAM(s) Oral daily  tobramycin for Nebulization 300milliGRAM(s) Inhalation every 12 hours  colistimethate IVPB 100milliGRAM(s) IV Intermittent every 12 hours  multivitamin 1Tablet(s) Oral daily  meropenem IVPB 2000milliGRAM(s) IV Intermittent every 8 hours  cyanocobalamin Injectable 1000MICROGram(s) IntraMuscular every 7 days  cholecalciferol 2000Unit(s) Oral daily  collagenase Ointment 1Application(s) Topical daily  enoxaparin Injectable 80milliGRAM(s) SubCutaneous <User Schedule>  vancomycin    Solution 250milliGRAM(s) Oral every 6 hours  thiamine 100milliGRAM(s) Oral daily    MEDICATIONS  (PRN):  acetaminophen    Suspension 650milliGRAM(s) Enteral Tube every 6 hours PRN For Temp greater than 38 C (100.4 F)  ondansetron Injectable 4milliGRAM(s) IV Push every 6 hours PRN Nausea and/or Vomiting  diphenhydrAMINE   Injectable 25milliGRAM(s) IntraMuscular every 4 hours PRN Rash and/or Itching  oxyCODONE  5 mG/acetaminophen 325 mG 1Tablet(s) Oral every 4 hours PRN Severe Pain (7 - 10)  acetaminophen   Tablet. 325milliGRAM(s) Oral every 4 hours PRN Moderate Pain (4 - 6)      ICU Vital Signs Last 24 Hrs  T(C): 37.5, Max: 37.8 (01-13 @ 00:43)  T(F): 99.5, Max: 100.1 (01-13 @ 00:43)  HR: 92 (80 - 115)  BP: 147/72 (121/78 - 157/90)  BP(mean): 99 (87 - 124)  ABP: --  ABP(mean): --  RR: 29 (18 - 43)  SpO2: 97% (89% - 100%)    PHYSICAL EXAM:  General - awake, alert, no acute distress   HEENT - MMM, PERRL, EOMI, no oral lesions noted   CV - S1, S2 RRR no murmurs   Resp - Decreased breath sounds b/l, rhonchi in anterior chest fields +trach  Abdomen - soft, obese +BS, tenderness to palpation around wound vac site with mild erythema, +wound vac, +J tube, +rectal tube   Extremities- moving all extremities, wwp, +2 pulses distal b/l       LABS:                                   7.6    8.2   )-----------( 286      ( 12 Jan 2017 05:45 )             25.4   12 Jan 2017 05:45    138    |  102    |  16     ----------------------------<  92     4.1     |  29     |  0.29     Ca    9.9        12 Jan 2017 05:45  Phos  3.1       12 Jan 2017 05:45  Mg     1.7       12 Jan 2017 05:45          MICROBIOLOGY:  Bl Cx 12/16, 12/24: NGTD   Surgical Swab (01.09.17 @ 20:04) Few Pseudomonas aeruginosa (multi drug resistant) Pseudomonas aeruginosa (multi drug resistant)      Abd fluid 12/11: strep milleri, moderate yeast     Bronchial wash 12/13, 12/24: MDR pseudomonas  Bronchial wash 12/14: yeast    Sputum 12/27: Moderate Pseudomonas, MDR        Pleural fluid: yellow, , glucose 79, Total protein 4.4, Nuc cell count 570, RBC 4550, segmented granulocytes 22, lymphocytes 55, monocyte/macrophage 11, eosinophil 7, mesothelial cells 5    Pleural fluid Cx 12/28: No growth    Abd fluid CHECO 12/30: MDR pseudomonas           RADIOLOGY & ADDITIONAL STUDIES:  CT c/a/p 1/3: Complete collapse of the right lower lobe. Complete collapse of the left   lower lobe. Small bilateral pleural effusions. 1. Persistent localized peripherally enhancing fluid collections within the pelvis most consistent with intraperitoneal abscesses. These have not significantly changed in size   when compared to the prior  Study. 2. S/p gastrectomy and gastrojejunal anastomosis with endoluminal stent in place. There has been removal of the nasogastric tube. 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 4. Grossly stable small bore percutaneous surgical drain in the right mid abdomen terminating in the left midabdomen with interval removal of the larger bore percutaneous  drainage catheter previously noted in the right midabdomen.  5. Stable hepatosplenomegaly.  6. Stable appearance of the left kidney demonstrating dilatation of the left renal pelvis versus a  parapelvic cyst and small 1 cm left cortical cyst. 7. Slightly decreasing subcutaneous edema      CXR 1/1: mild improvement in congestion and/or infiltrates     CT c/a/p w/ IV and green dye contrast 12/27: s/p gastrectomy and gastrojejunal anastomosis with a stent in place. 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  A few rim-enhancing fluid collections in the lower abdomen and pelvis which might be infected.Moderate to large left pleural effusion, enlarged since prior study. Small right pleural effusion. Extensive atelectatic changes in the lungs,   left greater than right.    CT c/a/p 12/21: 1.  Interval removal of endoluminal stent from the distal esophagus   extending to the jejunal loop. Interval placement of an esophageal stent. 2.  Slight interval decrease in small to moderate ascites, particularly   in the left upper quadrant surrounding the anastomosis. The largest pocket measures 1.3 x 6.6 cm in the midline upper pelvis, similar to the   prior study.3.  Further interval decrease in free air, with a a few punctate foci   remaining.4.  Slight interval decrease in small right pleural effusion. No change   in small left pleural effusion. Associated atelectasis bilaterally,   unchanged.5.  New anterior abdominal wall defect at the surgical incision site, presumably from interval debridement. 6.  Mild cardiomegaly and trace pericardial effusion, unchanged.  7.  Diffuse anasarca.      CXR 12/25: Improvement of opacification left hemithorax in comparison to   prior examination of the chest 12/25/2016. Bilateral effusions.   Underlying infiltrates cannot be excluded    CT ABDOMEN/PELVIS 01/12: Multiple intra-abdominal abscesses, most of which have decreased in size. The abscess posterior to the second portion of the duodenum has increased in size slightly. Persistent bilateral lobar collapse. Bilateral pleural effusions. INTERVAL HPI/OVERNIGHT EVENTS: Ptis now S/P IR drainage of abdominal collection. Repeat CT of the abdomen showed multiple intra-abdominal abscesses, most of which have decreased in size. The abscess posterior to the second portion of the duodenum has increased in size slightly. She was also evaluated by ENT yesterday evening. A bedside scope was performed, which confirmed normal anatomy.  Pt's Lack of vocalization is possibly due to size of trach;     ROS:       Allergies    sulfa drugs (Unknown)  sulfamethoxazole (Other)    Intolerances        ANTIBIOTICS/RELEVANT:  antimicrobials  tobramycin for Nebulization 300milliGRAM(s) Inhalation every 12 hours  colistimethate IVPB 100milliGRAM(s) IV Intermittent every 12 hours  tobramycin IVPB 150milliGRAM(s) IV Intermittent once  meropenem IVPB 2000milliGRAM(s) IV Intermittent every 8 hours    immunologic:    MEDICATIONS  (STANDING):  pantoprazole  Injectable 40milliGRAM(s) IV Push daily  insulin lispro (HumaLOG) corrective regimen sliding scale  SubCutaneous Before meals and at bedtime  ALBUTerol/ipratropium for Nebulization 3milliLiter(s) Nebulizer every 6 hours  ascorbic acid Syrup 500milliGRAM(s) Oral daily  zinc sulfate 220milliGRAM(s) Oral daily  tobramycin for Nebulization 300milliGRAM(s) Inhalation every 12 hours  colistimethate IVPB 100milliGRAM(s) IV Intermittent every 12 hours  multivitamin 1Tablet(s) Oral daily  meropenem IVPB 2000milliGRAM(s) IV Intermittent every 8 hours  cyanocobalamin Injectable 1000MICROGram(s) IntraMuscular every 7 days  cholecalciferol 2000Unit(s) Oral daily  collagenase Ointment 1Application(s) Topical daily  enoxaparin Injectable 80milliGRAM(s) SubCutaneous <User Schedule>  vancomycin    Solution 250milliGRAM(s) Oral every 6 hours  thiamine 100milliGRAM(s) Oral daily    MEDICATIONS  (PRN):  acetaminophen    Suspension 650milliGRAM(s) Enteral Tube every 6 hours PRN For Temp greater than 38 C (100.4 F)  ondansetron Injectable 4milliGRAM(s) IV Push every 6 hours PRN Nausea and/or Vomiting  diphenhydrAMINE   Injectable 25milliGRAM(s) IntraMuscular every 4 hours PRN Rash and/or Itching  oxyCODONE  5 mG/acetaminophen 325 mG 1Tablet(s) Oral every 4 hours PRN Severe Pain (7 - 10)  acetaminophen   Tablet. 325milliGRAM(s) Oral every 4 hours PRN Moderate Pain (4 - 6)      ICU Vital Signs Last 24 Hrs  T(C): 37.5, Max: 37.8 (01-13 @ 00:43)  T(F): 99.5, Max: 100.1 (01-13 @ 00:43)  HR: 92 (80 - 115)  BP: 147/72 (121/78 - 157/90)  BP(mean): 99 (87 - 124)  ABP: --  ABP(mean): --  RR: 29 (18 - 43)  SpO2: 97% (89% - 100%)    PHYSICAL EXAM:  General - awake, alert, no acute distress   HEENT - MMM, PERRL, EOMI, no oral lesions noted   CV - S1, S2 RRR no murmurs   Resp - Decreased breath sounds b/l, rhonchi in anterior chest fields +trach  Abdomen - soft, obese +BS, tenderness to palpation around wound vac site with mild erythema, +wound vac, +J tube, +rectal tube   Extremities- moving all extremities, wwp, +2 pulses distal b/l       LABS:                                   7.6    8.2   )-----------( 286      ( 12 Jan 2017 05:45 )             25.4   12 Jan 2017 05:45    138    |  102    |  16     ----------------------------<  92     4.1     |  29     |  0.29     Ca    9.9        12 Jan 2017 05:45  Phos  3.1       12 Jan 2017 05:45  Mg     1.7       12 Jan 2017 05:45          MICROBIOLOGY:  Bl Cx 12/16, 12/24: NGTD   Surgical Swab (01.09.17 @ 20:04) Few Pseudomonas aeruginosa (multi drug resistant) Pseudomonas aeruginosa (multi drug resistant)      Abd fluid 12/11: strep milleri, moderate yeast     Bronchial wash 12/13, 12/24: MDR pseudomonas  Bronchial wash 12/14: yeast    Sputum 12/27: Moderate Pseudomonas, MDR        Pleural fluid: yellow, , glucose 79, Total protein 4.4, Nuc cell count 570, RBC 4550, segmented granulocytes 22, lymphocytes 55, monocyte/macrophage 11, eosinophil 7, mesothelial cells 5    Pleural fluid Cx 12/28: No growth    Abd fluid CHECO 12/30: MDR pseudomonas           RADIOLOGY & ADDITIONAL STUDIES:  CT c/a/p 1/3: Complete collapse of the right lower lobe. Complete collapse of the left   lower lobe. Small bilateral pleural effusions. 1. Persistent localized peripherally enhancing fluid collections within the pelvis most consistent with intraperitoneal abscesses. These have not significantly changed in size   when compared to the prior  Study. 2. S/p gastrectomy and gastrojejunal anastomosis with endoluminal stent in place. There has been removal of the nasogastric tube. 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 4. Grossly stable small bore percutaneous surgical drain in the right mid abdomen terminating in the left midabdomen with interval removal of the larger bore percutaneous  drainage catheter previously noted in the right midabdomen.  5. Stable hepatosplenomegaly.  6. Stable appearance of the left kidney demonstrating dilatation of the left renal pelvis versus a  parapelvic cyst and small 1 cm left cortical cyst. 7. Slightly decreasing subcutaneous edema      CXR 1/1: mild improvement in congestion and/or infiltrates     CT c/a/p w/ IV and green dye contrast 12/27: s/p gastrectomy and gastrojejunal anastomosis with a stent in place. 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  A few rim-enhancing fluid collections in the lower abdomen and pelvis which might be infected.Moderate to large left pleural effusion, enlarged since prior study. Small right pleural effusion. Extensive atelectatic changes in the lungs,   left greater than right.    CT c/a/p 12/21: 1.  Interval removal of endoluminal stent from the distal esophagus   extending to the jejunal loop. Interval placement of an esophageal stent. 2.  Slight interval decrease in small to moderate ascites, particularly   in the left upper quadrant surrounding the anastomosis. The largest pocket measures 1.3 x 6.6 cm in the midline upper pelvis, similar to the   prior study.3.  Further interval decrease in free air, with a a few punctate foci   remaining.4.  Slight interval decrease in small right pleural effusion. No change   in small left pleural effusion. Associated atelectasis bilaterally,   unchanged.5.  New anterior abdominal wall defect at the surgical incision site, presumably from interval debridement. 6.  Mild cardiomegaly and trace pericardial effusion, unchanged.  7.  Diffuse anasarca.      CXR 12/25: Improvement of opacification left hemithorax in comparison to   prior examination of the chest 12/25/2016. Bilateral effusions.   Underlying infiltrates cannot be excluded    CT ABDOMEN/PELVIS 01/12: Multiple intra-abdominal abscesses, most of which have decreased in size. The abscess posterior to the second portion of the duodenum has increased in size slightly. Persistent bilateral lobar collapse. Bilateral pleural effusions.

## 2017-01-13 NOTE — PROGRESS NOTE ADULT - ASSESSMENT
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  Hospital course complicated by draining intra-abdominal abscess, resolving PNA with +MDR pseudomonas, +c diff, R subclavian DVT, stage III sacral decubitus ulcer.    S/P repeat CT 1/12, showed decrease in size of most intra-abdominal abscesses and persistent collapse of bilateral lobes with pleural effusion    Neuro: Pain currently controlled with standing Percocet 2q6, Dilaudid PRN for breakthrough. On Lexapro 20mg daily for preexisting depression.  CVS: MAP > 70, hemodynamically stable   Pulm: Trach collar in place, respiratory status stable, though failed Passy Silvia evaluation.  Reattempt today, to obtain ENT consult to evaluate vocal cords and airway.  On peridex, duonebs.   FEN/GI: Tube feeds held for CT. Protonix. Zofran PRN. Vit C, MVT.  Zinc sulfate.  Multivitamin.  Rectal tube in place for stage III decubitus ulcer.  : Yu  Endo: ISS.   ID: C. diff: Vancomycin PO (1/7) MDR Pseudomonas (sputum and peritoneal fluid) - micafungin (12/31-1/10 ), colistin (12/31- ), meropenem (12/31- ), INH tobramycin (12/31-- ) \\\ DC: linezolid (1/7-1/9) IV tobra (1/3, 1/4, 1/6), gent (12/26-) Imipenem (12/14--), Vanc (12/13-14), Zosyn (12/3-14), Linezolid (12/24-25),  fluconazole (12/4-25), - H. simplex Acyclovir (12/20-25). Micafingun (12/31-1/10).  Heme: SCDs, lovenox 80 bid  Lines/drains: L flank CHECO (1/6-), Feeding J-tube. PIV. ///dc: R PICC (12/21-1/10) Left IJ TLC (12/3--), left IJ Jaunjo (12/6-12/14).    Wounds: Midline abdominal wound, change wound vac q48 hrs (last 1/11)   Benadryl PRN for itching. 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  Hospital course complicated by draining intra-abdominal abscess, resolving PNA with +MDR pseudomonas, +c diff, R subclavian DVT, stage III sacral decubitus ulcer.    S/P repeat CT 1/12, showed decrease in size of most intra-abdominal abscesses and persistent collapse of bilateral lobes with pleural effusion    Neuro: Pain currently controlled with standing Percocet 2q6, Dilaudid PRN for breakthrough. On Lexapro 20mg daily for preexisting depression.  CVS: MAP > 70, hemodynamically stable   Pulm: Trach collar in place, respiratory status stable, though failed Passy Silvia evaluation.  ENT exam shows airway open with no damage to vocal cords suggesting that she will be able to vacalize when we downsize trach further.  This will be done after we decide on repeat endoscopy next week. On peridex, duonebs.   FEN/GI: Tube feeds tolerated, jevity 1.2 at 53/hr with prostat. Protonix. Zofran PRN. Vit C, MVT.  Zinc sulfate.  Multivitamin.  Rectal tube in place for stage III decubitus ulcer. To consider repeat upper endoscopy next week.  : Yu  Endo: ISS.   ID: C. diff: Vancomycin PO (1/7) MDR Pseudomonas (sputum and peritoneal fluid) - micafungin (12/31-1/10 ), colistin (12/31- ), meropenem (12/31- ), INH tobramycin (12/31-- ) \\\ DC: linezolid (1/7-1/9) IV tobra (1/3, 1/4, 1/6), gent (12/26-) Imipenem (12/14--), Vanc (12/13-14), Zosyn (12/3-14), Linezolid (12/24-25),  fluconazole (12/4-25), - H. simplex Acyclovir (12/20-25). Micafingun (12/31-1/10).  Heme: SCDs, lovenox 80 bid  Lines/drains: L flank CHECO (1/6-), Feeding J-tube. PIV. ///dc: R PICC (12/21-1/10) Left IJ TLC (12/3--), left IJ Juanjo (12/6-12/14).    Wounds: Midline abdominal wound, change wound vac q48 hrs (last 1/11)   Benadryl PRN for itching.

## 2017-01-13 NOTE — PROGRESS NOTE ADULT - SUBJECTIVE AND OBJECTIVE BOX
Interval events: No new issues/events overnight, no new meds.  s/p ENT consult yesterday evening, bedside scope performed, confirmed normal anatomy.  Lack of vocalization possibly due to size of trach; will reattempt Passy Silvia as tolerated.      CT Abdomen/Pelvis performed yesterday afternoon (1/12):  Impression:  1.  Multiple intra-abdominal abscesses, most of which have decreased in   size. The abscess posterior to the second portion of the duodenum has   increased in size slightly.  2.  Persistent bilateral lobar collapse. Bilateral pleural effusions.    S:  Pt has no complaints this AM.  Pain well controlled.      O: ICU Vital Signs Last 24 Hrs  T(F): 99.5, Max: 100.1 (01-13 @ 00:43)  HR: 92 (80 - 115)  BP: 147/72 (121/78 - 157/90)  BP(mean): 99 (87 - 124)  ABP: --  RR: 29 (18 - 43)  SpO2: 97% (89% - 100%)  Wt(kg): --    PHYSICAL EXAM:     Neurological: AAOx3, NAD  Cardiovascular: RRR, 2/6 early systolic murmur  Respiratory: CTAB, no adventitious sounds, no respiratory distress  Gastrointestinal:  soft, nondistended, nontender to palpation, no rebound or guarding.  3-4 cm area of induration at previous drain site, now draining thin milky fluid, low output.  Nonpainful.  Midline wound vac, feeding J tube, L flank CHECO in place.  Extremities: warm, mild dependent edema  Vascular: no cyanosis/erythema    LABS:    12 Jan 2017 05:45    138    |  102    |  16     ----------------------------<  92     4.1     |  29     |  0.29     Ca    9.9        12 Jan 2017 05:45  Phos  3.1       12 Jan 2017 05:45  Mg     1.7       12 Jan 2017 05:45                          7.6    8.2   )-----------( 286      ( 12 Jan 2017 05:45 )             25.4     CAPILLARY BLOOD GLUCOSE  113 (13 Jan 2017 07:00)  104 (12 Jan 2017 22:00)        MEDICATIONS  (STANDING):  pantoprazole  Injectable 40milliGRAM(s) IV Push daily  insulin lispro (HumaLOG) corrective regimen sliding scale  SubCutaneous Before meals and at bedtime  ALBUTerol/ipratropium for Nebulization 3milliLiter(s) Nebulizer every 6 hours  ascorbic acid Syrup 500milliGRAM(s) Oral daily  zinc sulfate 220milliGRAM(s) Oral daily  tobramycin for Nebulization 300milliGRAM(s) Inhalation every 12 hours  colistimethate IVPB 100milliGRAM(s) IV Intermittent every 12 hours  multivitamin 1Tablet(s) Oral daily  meropenem IVPB 2000milliGRAM(s) IV Intermittent every 8 hours  cyanocobalamin Injectable 1000MICROGram(s) IntraMuscular every 7 days  cholecalciferol 2000Unit(s) Oral daily  collagenase Ointment 1Application(s) Topical daily  enoxaparin Injectable 80milliGRAM(s) SubCutaneous <User Schedule>  vancomycin    Solution 250milliGRAM(s) Oral every 6 hours  thiamine 100milliGRAM(s) Oral daily  magnesium oxide 400milliGRAM(s) Oral once    MEDICATIONS  (PRN):  acetaminophen    Suspension 650milliGRAM(s) Enteral Tube every 6 hours PRN For Temp greater than 38 C (100.4 F)  ondansetron Injectable 4milliGRAM(s) IV Push every 6 hours PRN Nausea and/or Vomiting  diphenhydrAMINE   Injectable 25milliGRAM(s) IntraMuscular every 4 hours PRN Rash and/or Itching  oxyCODONE  5 mG/acetaminophen 325 mG 1Tablet(s) Oral every 4 hours PRN Severe Pain (7 - 10)  acetaminophen   Tablet. 325milliGRAM(s) Oral every 4 hours PRN Moderate Pain (4 - 6)      Yu:	  [ ] None	[x ] Daily Yu Order Placed	   Indication:	  [ ] Strict I and O's    [ ] Obstruction     [x ] Incontinence + Stage 3 or 4 Decubitus  Central Line:  [x ] None	   [ ]  Medication / TPN Administration     [ ] No Peripheral IV Interval events: No new issues/events overnight, no new meds.  s/p ENT consult yesterday evening, bedside scope performed, confirmed normal anatomy.  Lack of vocalization possibly due to size of trach; will reattempt Passy Silvia when we insert cuffless trach.      CT Abdomen/Pelvis performed yesterday afternoon (1/12):  Impression:  1.  Multiple intra-abdominal abscesses, most of which have decreased in   size. The abscess posterior to the second portion of the duodenum has   increased in size slightly.  2.  Persistent bilateral lobar collapse. Bilateral pleural effusions.    S:  Pt has no complaints this AM.  Pain well controlled.      O: ICU Vital Signs Last 24 Hrs  T(F): 99.5, Max: 100.1 (01-13 @ 00:43)  HR: 92 (80 - 115)  BP: 147/72 (121/78 - 157/90)  BP(mean): 99 (87 - 124)  ABP: --  RR: 29 (18 - 43)  SpO2: 97% (89% - 100%)  Wt(kg): --    PHYSICAL EXAM:     Neurological: AAOx3, NAD  Skin: no rash. The sacral decubitus is healing.  Cardiovascular: RRR, 2/6 early systolic murmur  Respiratory: CTAB, no adventitious sounds, no respiratory distress  Gastrointestinal:  soft, nondistended, nontender to palpation, no rebound or guarding.  3-4 cm area of induration at previous drain site, now draining thin milky fluid, low output.  Nonpainful.  Midline wound vac, feeding J tube, L flank CHECO in place.  : stone in place.  Extremities: warm, mild dependent edema  Vascular/MSK: 2+ pulses, no cyanosis/erythema/clubbing or joint swelling    LABS:    12 Jan 2017 05:45    138    |  102    |  16     ----------------------------<  92     4.1     |  29     |  0.29     Ca    9.9        12 Jan 2017 05:45  Phos  3.1       12 Jan 2017 05:45  Mg     1.7       12 Jan 2017 05:45                          7.6    8.2   )-----------( 286      ( 12 Jan 2017 05:45 )             25.4     CAPILLARY BLOOD GLUCOSE  113 (13 Jan 2017 07:00)  104 (12 Jan 2017 22:00)        MEDICATIONS  (STANDING):  pantoprazole  Injectable 40milliGRAM(s) IV Push daily  insulin lispro (HumaLOG) corrective regimen sliding scale  SubCutaneous Before meals and at bedtime  ALBUTerol/ipratropium for Nebulization 3milliLiter(s) Nebulizer every 6 hours  ascorbic acid Syrup 500milliGRAM(s) Oral daily  zinc sulfate 220milliGRAM(s) Oral daily  tobramycin for Nebulization 300milliGRAM(s) Inhalation every 12 hours  colistimethate IVPB 100milliGRAM(s) IV Intermittent every 12 hours  multivitamin 1Tablet(s) Oral daily  meropenem IVPB 2000milliGRAM(s) IV Intermittent every 8 hours  cyanocobalamin Injectable 1000MICROGram(s) IntraMuscular every 7 days  cholecalciferol 2000Unit(s) Oral daily  collagenase Ointment 1Application(s) Topical daily  enoxaparin Injectable 80milliGRAM(s) SubCutaneous <User Schedule>  vancomycin    Solution 250milliGRAM(s) Oral every 6 hours  thiamine 100milliGRAM(s) Oral daily  magnesium oxide 400milliGRAM(s) Oral once    MEDICATIONS  (PRN):  acetaminophen    Suspension 650milliGRAM(s) Enteral Tube every 6 hours PRN For Temp greater than 38 C (100.4 F)  ondansetron Injectable 4milliGRAM(s) IV Push every 6 hours PRN Nausea and/or Vomiting  diphenhydrAMINE   Injectable 25milliGRAM(s) IntraMuscular every 4 hours PRN Rash and/or Itching  oxyCODONE  5 mG/acetaminophen 325 mG 1Tablet(s) Oral every 4 hours PRN Severe Pain (7 - 10)  acetaminophen   Tablet. 325milliGRAM(s) Oral every 4 hours PRN Moderate Pain (4 - 6)      Stone:	  [ ] None	[x ] Daily Stone Order Placed	   Indication:	  [ ] Strict I and O's    [ ] Obstruction     [x ] Incontinence + Stage 3 or 4 Decubitus  Central Line:  [x ] None	   [ ]  Medication / TPN Administration     [ ] No Peripheral IV

## 2017-01-13 NOTE — PROGRESS NOTE ADULT - ASSESSMENT
56 year old critical patient s/p SIPs procedure with free air concerning for anastomotic leak s/p emergent ex lap with abdominal washout found to have leak of esophagojejunostomy which they could not repair due to its location.  She is s/p esophageal stent placement, peritoneal washout and internal double pigtail stent placement. During endoscopy second defect noted on enteric side of anastomosis. Repeat endoscopy removed stent and placed a second stent over entire 5 cm anastomotic defect. 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 She has had repeated bronchoscopies with 12/13 and 12/24 bronchial wash growing MDR pseudomonas. She had a pigtail placed, roughly 600cc fluid removed and chest tube removed. She had a CT scan which showed persistent fluid collections (abdominal abscesses) and collapsed lung b/l. Repeat CT of the abdomen shows Multiple intra-abdominal abscesses, most of which have decreased in size. The abscess posterior to the second portion of the duodenum has increased in size slightly.   We are concerned of two sites of infection - lung (XDR Pseudomonas) and intrabdominal collections (polymicrobial- Pseudomonas, yeast, strep milleri in previous cultures). In light of her bacteriology and resistance patterns after calling and speaking with microbiology lab as well as with Dr. Bains (earlier in hospital course) who was previously on the case (ID service) we recommend:    -Treat yeast with micafungin   -Treat XDR pseudomonas with colistin (low dose) and synergistic effect with meropenem (which also has GNR +strep milleri coverage) and additional IV aminoglycoside/Tobramycin (for systemic infection) as well as nebulized aminoglycoside Tobramycin for lung infection and Cipro BID 56 year old critical patient s/p SIPs procedure with free air concerning for anastomotic leak s/p emergent ex lap with abdominal washout found to have leak of esophagojejunostomy which they could not repair due to its location.  She is s/p esophageal stent placement, peritoneal washout and internal double pigtail stent placement. During endoscopy second defect noted on enteric side of anastomosis. Repeat endoscopy removed stent and placed a second stent over entire 5 cm anastomotic defect. 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 She has had repeated bronchoscopies with 12/13 and 12/24 bronchial wash growing MDR pseudomonas. She had a pigtail placed, roughly 600cc fluid removed and chest tube removed. She had a CT scan which showed persistent fluid collections (abdominal abscesses) and collapsed lung b/l. Pt is now S/P IR drainage of abdominal collection. Repeat CT of the abdomen shows Multiple intra-abdominal abscesses, most of which have decreased in size. The abscess posterior to the second portion of the duodenum has increased in size slightly.   We are concerned of two sites of infection - lung (XDR Pseudomonas) and intrabdominal collections (polymicrobial- Pseudomonas, yeast, strep milleri in previous cultures). In light of her bacteriology and resistance patterns after calling and speaking with microbiology lab as well as with Dr. Bains (earlier in hospital course) who was previously on the case (ID service) we recommend:      - Continue Treating XDR pseudomonas with colistin (low dose) (12/31-) and synergistic effect with meropenem (which also has GNR +strep milleri coverage)(12/31-) and additional IV Tobramycin (for systemic infection) (12/31) as well as nebulized aminoglycoside Tobramycin for lung infection (12/31)   -As Colistin is nephrotoxic and patient getting studies with contrast, close attention must be paid to daily  renal clearance  -Continue with PO Vanco for C Diff (1/7) 56 year old critical patient s/p SIPs procedure with free air concerning for anastomotic leak s/p emergent ex lap with abdominal washout found to have leak of esophagojejunostomy which they could not repair due to its location.  She is s/p esophageal stent placement, peritoneal washout and internal double pigtail stent placement. During endoscopy second defect noted on enteric side of anastomosis. Repeat endoscopy removed stent and placed a second stent over entire 5 cm anastomotic defect. 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 She has had repeated bronchoscopies with 12/13 and 12/24 bronchial wash growing MDR pseudomonas. She had a pigtail placed, roughly 600cc fluid removed and chest tube removed. She had a CT scan which showed persistent fluid collections (abdominal abscesses) and collapsed lung b/l. Pt is now S/P IR drainage of abdominal collection. Repeat CT of the abdomen shows Multiple intra-abdominal abscesses, most of which have decreased in size. The abscess posterior to the second portion of the duodenum has increased in size slightly.   We are concerned of two sites of infection - lung (XDR Pseudomonas) and intrabdominal collections (polymicrobial- Pseudomonas, yeast, strep milleri in previous cultures).     - Continue Treating XDR pseudomonas with colistin (low dose) (12/31-) and synergistic effect with meropenem (which also has GNR +strep milleri coverage)(12/31-) and additional IV Tobramycin (for systemic infection) (12/31) as well as nebulized aminoglycoside Tobramycin for lung infection (12/31)   -As Colistin is nephrotoxic and patient getting studies with contrast, close attention must be paid to daily  renal clearance  -Continue with PO Vanco for C Diff (1/7)

## 2017-01-13 NOTE — PROGRESS NOTE ADULT - SUBJECTIVE AND OBJECTIVE BOX
doing much better  in chair   ct scan reviewed and nothing else to be drained  discussed with icu staff  also will rescope next week

## 2017-01-14 NOTE — PROGRESS NOTE ADULT - ASSESSMENT
56 year old critical patient s/p SIPs procedure with free air concerning for anastomotic leak s/p emergent ex lap with abdominal washout found to have leak of esophagojejunostomy which they could not repair due to its location.  She is s/p esophageal stent placement, peritoneal washout and internal double pigtail stent placement. During endoscopy second defect noted on enteric side of anastomosis. Repeat endoscopy removed stent and placed a second stent over entire 5 cm anastomotic defect. 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 She has had repeated bronchoscopies with 12/13 and 12/24 bronchial wash growing MDR pseudomonas. She had a pigtail placed, roughly 600cc fluid removed and chest tube removed. She had a CT scan which showed persistent fluid collections (abdominal abscesses) and collapsed lung b/l. Pt is now S/P IR drainage of abdominal collection. Repeat CT of the abdomen shows Multiple intra-abdominal abscesses, most of which have decreased in size. The abscess posterior to the second portion of the duodenum has increased in size slightly.

## 2017-01-14 NOTE — PROGRESS NOTE ADULT - PROBLEM SELECTOR PLAN 1
1) Continue Treating XDR pseudomonas with colistin (low dose) (12/31-) and synergistic effect with meropenem (which also has GNR +strep milleri coverage) as well as nebulized aminoglycoside Tobramycin for lung infection (12/31)   -As Colistin is nephrotoxic and patient getting studies with contrast, close attention must be paid to daily  renal clearance  -Continue with PO Vanco for C Diff (2/7)

## 2017-01-14 NOTE — PROGRESS NOTE ADULT - ATTENDING COMMENTS
the patient was seen and examined. I discussed the case in details during rounds with the resident. I reviewed the labs medication and chest x-ray. The patient is clinically stable.  She is on antibiotic. She is tolerating 2-3. She is on trach collar

## 2017-01-14 NOTE — PROGRESS NOTE ADULT - SUBJECTIVE AND OBJECTIVE BOX
INTERVAL HPI/OVERNIGHT EVENTS: s/p tracheostomy    CONSTITUTIONAL:  Negative fever or chills  CARDIOVASCULAR:  Negative for chest pain or palpitations  RESPIRATORY:  intubated, no wheezing  GASTROINTESTINAL:  Negative  vomiting, diarrhea  GENITOURINARY:  Negative  dysuria  NEUROLOGIC:  No headache, confusion, dizziness, lightheadedness      ANTIBIOTICS/RELEVANT:  colistimethate IVPB 100milliGRAM(s) IV Intermittent every 12 hours  meropenem IVPB 2000milliGRAM(s) IV Intermittent every 8 hours  vancomycin    Solution 125milliGRAM(s) Oral every 6 hours  tobramycin for Nebulization 300milliGRAM(s) Inhalation every 12 hours    Vital Signs Last 24 Hrs  T(C): 37.7, Max: 37.7 (01-14 @ 00:45)  T(F): 99.8, Max: 99.9 (01-14 @ 00:45)  HR: 102 (90 - 104)  BP: 150/80 (113/59 - 168/76)  BP(mean): 108 (83 - 117)  RR: 33 (15 - 36)  SpO2: 97% (95% - 100%)    PHYSICAL EXAM:  Constitutional: middle age female,   Ear/Nose/Throat: no oral lesion, no sinus tenderness on percussion	  Neck:(+) tracheostomy  Respiratory: CTA eileen  Cardiovascular: bibasilar crackles  Gastrointestinal: softly distended, CHECO  and Vac in place (+) BS, no HSM  Extremities: no e/e/c    LABS:                        7.4    6.8   )-----------( 286      ( 14 Jan 2017 05:09 )             24.3     14 Jan 2017 05:09    139    |  102    |  15     ----------------------------<  86     4.0     |  30     |  0.32     Ca    9.6        14 Jan 2017 05:09  Phos  2.4       14 Jan 2017 05:09  Mg     1.5       14 Jan 2017 05:09    MICROBIOLOGY:  Culture - Surgical Swab (01.09.17 @ 20:04)    -  Amikacin: S <=16    -  Cefepime: R >16    -  Meropenem: I 8    -  Piperacillin/Tazobactam: R    -  Tobramycin: S    Gram Stain:   No organisms seen  Moderate WBC's  Few epithelial cells    -  Aztreonam: R    -  Imipenem: R    -  Aztreonam: R >16    -  Ceftazidime: R    -  Ceftazidime: R >16    -  Gentamicin: S    -  Imipenem: R >8    -  Levofloxacin: I 4    -  Ciprofloxacin: I 2    -  Gentamicin: S <=4    -  Piperacillin/Tazobactam: R >64    -  Tobramycin: S <=4    Specimen Source: .Surgical Swab Wound - Upper    Culture Results:   Few Pseudomonas aeruginosa (multi drug resistant)  Result called to and read back by_ N.Sicard RN  01/12/2017 09:01:22    Organism Identification: Pseudomonas aeruginosa (multi drug resistant)  Pseudomonas aeruginosa (multi drug resistant)    Organism: Pseudomonas aeruginosa (multi drug resistant)    Organism: Pseudomonas aeruginosa (multi drug resistant)    Method Type: KB    Method Type: ALISHA      RADIOLOGY & ADDITIONAL STUDIES:     CT of the ABDOMEN and PELVIS with intravenous contrast   dated 1/12/2017 12:01 PM      IMPRESSION:  1.  Multiple intra-abdominal abscesses, most of which have decreased in   size. The abscess posterior to the second portion of the duodenum has   increased in size slightly.  2.  Persistent bilateral lobar collapse. Bilateral pleural effusions.

## 2017-01-14 NOTE — PROGRESS NOTE ADULT - SUBJECTIVE AND OBJECTIVE BOX
SUBJECTIVE: Pt seen and examined at bedside. No overnight events. No f/c/n/v. Pt tolerating trach well with no shortness of breath.    Vital Signs Last 24 Hrs  T(C): 37.7, Max: 37.7 (01-14 @ 00:45)  T(F): 99.8, Max: 99.9 (01-14 @ 00:45)  HR: 100 (90 - 108)  BP: 150/88 (99/52 - 174/73)  BP(mean): 117 (67 - 138)  RR: 30 (15 - 39)  SpO2: 99% (96% - 100%)    PHYSICAL EXAM    Gen: NAD  Neuro: AAOx3, Strength intact bilaterally  CV: tachycardic, RR  Pulm: CTAB  GI:  soft, some distension, NT. VAC in place to midline wound with good suction, feeding J tube in place. L flank CHECO in place.  Ext: WWP    I&O's Detail      LABS:                        7.4    6.8   )-----------( 286      ( 14 Jan 2017 05:09 )             24.3     14 Jan 2017 05:09    139    |  102    |  15     ----------------------------<  86     4.0     |  30     |  0.32     Ca    9.6        14 Jan 2017 05:09  Phos  2.4       14 Jan 2017 05:09  Mg     1.5       14 Jan 2017 05:09      ASSESSMENT AND PLAN  56YOF s/p bypass revision following failed SIPS (anastamotic leak) with subsequent placement of esophageal stent.  Hospital course complicated by draining intra-abdominal abscess, resolving PNA with +MDR pseudomonas, +c diff. Pt currently stable. Tolerating trach collar    Neuro: Percocet 1q4 PRN,  Lexapro 20mg daily  CVS: MAP > 70, normotensive goals    Pulm: Trach collar, cuff deflated; downsized to 6. Peridex. Duonebs.   FEN/GI: J-tube TF 53cc/hr Osmolite 1.2. Protonix. Zofran PRN. Vit C, MVT.  Zinc sulfate.  Multivitamin.  Rectal tube.  : stone (stg III sacral ulcer)  Endo: no issues   ID: C. diff: Vancomycin PO (1/7) MDR Pseudomonas (sputum and peritoneal fluid) - colistin (12/31- ), meropenem (12/31- ), INH tobramycin (12/31-1/14) \\\ DC: linezolid (1/7-1/9) IV tobra (1/3, 1/4, 1/6), gent (12/26-) Imipenem (12/14--), Vanc (12/13-14), Zosyn (12/3-14), Linezolid (12/24-25),  fluconazole (12/4-25), - H. simplex Acyclovir (12/20-25), micafungin (12/31-1/10 ),   Heme: SCDs, lovenox 80 bid,   Lines/drains: L flank CHECO (1/6-), Feeding J-tube. R PICC (12/21-) to be changed for new PICC ///dc: Left IJ TLC (12/3--), left IJ Juanjo (12/6-12/14).    Wounds: Midline abdominal wound, change wound vac q48 hrs (last 1/13)

## 2017-01-15 NOTE — PROGRESS NOTE ADULT - ASSESSMENT
56YOF s/p bypass revision following failed SIPS (anastamotic leak), found to have draining intra-abbdominal abscess, resolving acute respiratory failure, RUE DVT, pna and cdiff    Neuro: Percocet 2q4 standing,  Lexapro 20mg daily  CVS: MAP > 70, normotensive goals    Pulm: Trach collar, cuff deflated; downsized to 6. Peridex. Duonebs.   FEN/GI: J-tube TF 53cc/hr Osmolite 1.2. Protonix. Zofran PRN. Vit C, MVT.  Zinc sulfate.  Multivitamin.  Rectal tube.  : stone (stg III sacral ulcer)  Endo: no issues   ID: C. diff: Vancomycin PO (1/7) MDR Pseudomonas (sputum and peritoneal fluid) - colistin (12/31- ), meropenem (12/31- ), INH tobramycin (12/31-1/14) \\\ DC: linezolid (1/7-1/9) IV tobra (1/3, 1/4, 1/6), gent (12/26-) Imipenem (12/14--), Vanc (12/13-14), Zosyn (12/3-14), Linezolid (12/24-25),  fluconazole (12/4-25), - H. simplex Acyclovir (12/20-25), micafungin (12/31-1/10 ),   Heme: SCDs, lovenox 80 bid,   Lines/drains: L flank CHECO (1/6-), Feeding J-tube. R PICC (12/21-) to be changed for new PICC ///dc: Left IJ TLC (12/3--), left IJ Juanjo (12/6-12/14).    Wounds: Midline abdominal wound, change wound vac q48 hrs (last 1/11)   Benadryl PRN for itching.    Discuss transfer to SDU

## 2017-01-15 NOTE — PROGRESS NOTE ADULT - SUBJECTIVE AND OBJECTIVE BOX
INTERVAL HPI/OVERNIGHT EVENTS: Low grade fever    ANTIBIOTICS/RELEVANT:    colistimethate IVPB 100milliGRAM(s) IV Intermittent every 12 hours    meropenem IVPB 2000milliGRAM(s) IV Intermittent every 8 hours  vancomycin    Solution 125milliGRAM(s) Oral every 6 hours      Vital Signs Last 24 Hrs  T(C): 38.1, Max: 38.1 (01-15 @ 17:46)  T(F): 100.6, Max: 100.6 (01-15 @ 17:46)  HR: 105 (90 - 118)  BP: 157/87 (94/60 - 176/83)  BP(mean): 112 (77 - 125)  RR: 229 (11 - 229)  SpO2: 979% (90% - 979%)      PHYSICAL EXAM:  General - awake, alert, no acute distress   HEENT - MMM, PERRL, EOMI, no oral lesions noted   CV - S1, S2 RRR no murmurs   Resp - Decreased breath sounds b/l, rhonchi in anterior chest fields +trach  Abdomen - soft, obese +BS, tenderness to palpation around wound vac site with mild erythema, +wound vac, +J tube, +rectal tube   Extremities- moving all extremities, wwp, +2 pulses distal b/l       LABS:                        7.1    8.0   )-----------( 319      ( 15 Jorge 2017 05:52 )             23.7     14 Jan 2017 05:09    139    |  102    |  15     ----------------------------<  86     4.0     |  30     |  0.32     Ca    9.6        14 Jan 2017 05:09  Phos  2.4       14 Jan 2017 05:09  Mg     1.5       14 Jan 2017 05:09 INTERVAL HPI/OVERNIGHT EVENTS: Low grade fever and chills    ANTIBIOTICS/RELEVANT:    colistimethate IVPB 100milliGRAM(s) IV Intermittent every 12 hours    meropenem IVPB 2000milliGRAM(s) IV Intermittent every 8 hours  vancomycin    Solution 125milliGRAM(s) Oral every 6 hours      Vital Signs Last 24 Hrs  T(C): 38.1, Max: 38.1 (01-15 @ 17:46)  T(F): 100.6, Max: 100.6 (01-15 @ 17:46)  HR: 105 (90 - 118)  BP: 157/87 (94/60 - 176/83)  BP(mean): 112 (77 - 125)  RR: 229 (11 - 229)  SpO2: 979% (90% - 979%)      PHYSICAL EXAM: s/p tracheostomy  General - awake, alert, no acute distress   HEENT - MMM, PERRL, EOMI, no oral lesions noted   CV - S1, S2 RRR no murmurs   Resp - Decreased breath sounds b/l, rhonchi in anterior chest fields +trach  Abdomen - soft, obese +BS, tenderness to palpation around wound vac site with mild erythema, +wound vac, +J tube, +rectal tube   Extremities- moving all extremities,+2 pulses distal b/l ,       LABS:                        7.1    8.0   )-----------( 319      ( 15 Jorge 2017 05:52 )             23.7     14 Jan 2017 05:09    139    |  102    |  15     ----------------------------<  86     4.0     |  30     |  0.32     Ca    9.6        14 Jan 2017 05:09  Phos  2.4       14 Jan 2017 05:09  Mg     1.5       14 Jan 2017 05:09 INTERVAL HPI/OVERNIGHT EVENTS: Low grade fever and chills    ANTIBIOTICS/RELEVANT:    colistimethate IVPB 100milliGRAM(s) IV Intermittent every 12 hours, Day 15    meropenem IVPB 2000milliGRAM(s) IV Intermittent every 8 hours  vancomycin    Solution 125milliGRAM(s) Oral every 6 hours      Vital Signs Last 24 Hrs  T(C): 38.1, Max: 38.1 (01-15 @ 17:46)  T(F): 100.6, Max: 100.6 (01-15 @ 17:46)  HR: 105 (90 - 118)  BP: 157/87 (94/60 - 176/83)  BP(mean): 112 (77 - 125)  RR: 229 (11 - 229)  SpO2: 979% (90% - 979%)      PHYSICAL EXAM: s/p tracheostomy  General - awake, alert, no acute distress   HEENT - MMM, PERRL, EOMI, no oral lesions noted   CV - S1, S2 RRR no murmurs   Resp - Decreased breath sounds b/l, rhonchi in anterior chest fields +trach  Abdomen - soft, obese +BS, tenderness to palpation around wound vac site with mild erythema, +wound vac~200cc, +J tube, +rectal tube   Extremities- moving all extremities,+2 pulses distal b/l ,       LABS:                        7.1    8.0   )-----------( 319      ( 15 Jorge 2017 05:52 )             23.7     14 Jan 2017 05:09    139    |  102    |  15     ----------------------------<  86     4.0     |  30     |  0.32     Ca    9.6        14 Jan 2017 05:09  Phos  2.4       14 Jan 2017 05:09  Mg     1.5       14 Jan 2017 05:09

## 2017-01-15 NOTE — PROGRESS NOTE ADULT - PROBLEM SELECTOR PLAN 1
1) Continue Treating XDR pseudomonas with colistin (low dose) (12/31-) and synergistic effect with meropenem (which also has GNR +strep milleri coverage)   -As Colistin is nephrotoxic and patient getting studies with contrast, close attention must be paid to daily  renal clearance  2) Continue with PO Vanco for C Diff (4/10).

## 2017-01-15 NOTE — PROGRESS NOTE ADULT - SUBJECTIVE AND OBJECTIVE BOX
IR Follow Up Note    57 y/o F with left abdominal abscess s/p drainage tube placement.  No complaints.  Output: 15 mL/last 24 hours. Purulent.  Flushed with 5 mL NS.    Consider removing catheter when output is less than 10 mL per 24 hours.

## 2017-01-15 NOTE — PROGRESS NOTE ADULT - ATTENDING COMMENTS
the patient was seen and examined. I discussed the case in details with the resident during rounds. I reviewed labs medication chest x-ray and clinical status. The plan was outlined and discussed in detail with the resident.  the patient clinically stable. I discussed the case with Dr. Pruett. The plan was to keep the patient in the ICU until she is decannulated. I explained to him that his pulmonary status which is I am familiar with a stable and patient can be downgraded regarding the tracheostomy.  The patient plans to have EGD.

## 2017-01-15 NOTE — PROGRESS NOTE ADULT - ASSESSMENT
56 year old critical patient s/p SIPs procedure with free air concerning for anastomotic leak s/p emergent ex lap with abdominal washout found to have leak of esophagojejunostomy which they could not repair due to its location.  She is s/p esophageal stent placement, peritoneal washout and internal double pigtail stent placement. 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 She has had repeated bronchoscopies with 12/13 and 12/24 bronchial wash growing MDR pseudomonas. She had a pigtail placed, roughly 600cc fluid removed and chest tube removed. She had a CT scan which showed persistent fluid collections (abdominal abscesses) and collapsed lung b/l. Pt is now S/P IR drainage of abdominal collection. Repeat CT of the abdomen shows Multiple intra-abdominal abscesses, most of which have decreased in size. The abscess posterior to the second portion of the duodenum has increased in size slightly.

## 2017-01-15 NOTE — PROGRESS NOTE ADULT - SUBJECTIVE AND OBJECTIVE BOX
S: Pt reports no complaints this morning, no abd or chest pain, no SOB    Vitals: Vital Signs Last 24 Hrs  T(C): 36.8, Max: 37.9 (01-14 @ 22:36)  T(F): 98.3, Max: 100.3 (01-14 @ 22:36)  HR: 94 (90 - 106)  BP: 150/80 (134/74 - 176/83)  BP(mean): 110 (100 - 125)  RR: 26 (22 - 50)  SpO2: 100% (90% - 100%)    CAPILLARY BLOOD GLUCOSE    I & Os for current day (as of 01-15 @ 08:25)  =============================================  IN: 1816 ml / OUT: 1856 ml / NET: -40 ml    Labs:                         7.1    8.0   )-----------( 319      ( 15 Jorge 2017 05:52 )             23.7   14 Jan 2017 05:09    139    |  102    |  15     ----------------------------<  86     4.0     |  30     |  0.32     Ca    9.6        14 Jan 2017 05:09  Phos  2.4       14 Jan 2017 05:09  Mg     1.5       14 Jan 2017 05:09      Electrolytes repleated to goals as follows: Potassium 4, Phosphorus 3 and Magnesium 2 where appropriate and necessary    Exam:  Neuro: A&Ox3, NAD, grossly neuro intact  Skin: sacral decubitus improving  CV: RRR, no MRGs  Pulm: CTAB, decreased lower field breath sounds, no wheezes, rales ronchi  Abd: BS (+), obese, Soft, NT, mild distension, midline wound vac in place with good suction, no surrounding erythema or ecchymosis. Area of induration at previous drain site, draining thin milky fluid, low output.  Nonpainful. Feeding J tube, L flank CHECO in place, no surrounding erythema or ecchymosis.   Ext: minimal edema in all 4 extremities  Vasc: Extremities warm to palpation, 2+ pulses - radial and PT

## 2017-01-16 NOTE — PROGRESS NOTE ADULT - SUBJECTIVE AND OBJECTIVE BOX
STATUS POST:    11/28: Harmony-en-Y reconstruction  12/3: ex lap, abdominal washout, placement of a feeding j tube in common channel distal to Russel anastomosis, and placement of abdominal drains.  Abdominal wound left open with wound vac in place.  Intra-op, patient found to have leak of esophagojejunostomy which they could not repair due to its location.    SUBJECTIVE:  Pt w/ no complaints at this time. No acute events per nursing staff and SICU team.     MEDICATIONS  (STANDING):  pantoprazole  Injectable 40milliGRAM(s) IV Push daily  ALBUTerol/ipratropium for Nebulization 3milliLiter(s) Nebulizer every 6 hours  ascorbic acid Syrup 500milliGRAM(s) Oral daily  zinc sulfate 220milliGRAM(s) Oral daily  colistimethate IVPB 100milliGRAM(s) IV Intermittent every 12 hours  multivitamin 1Tablet(s) Oral daily  meropenem IVPB 2000milliGRAM(s) IV Intermittent every 8 hours  cyanocobalamin Injectable 1000MICROGram(s) IntraMuscular every 7 days  cholecalciferol 2000Unit(s) Oral daily  collagenase Ointment 1Application(s) Topical daily  enoxaparin Injectable 80milliGRAM(s) SubCutaneous <User Schedule>  thiamine 100milliGRAM(s) Oral daily  vancomycin    Solution 125milliGRAM(s) Oral every 6 hours    MEDICATIONS  (PRN):  acetaminophen    Suspension 650milliGRAM(s) Enteral Tube every 6 hours PRN For Temp greater than 38 C (100.4 F)  ondansetron Injectable 4milliGRAM(s) IV Push every 6 hours PRN Nausea and/or Vomiting  diphenhydrAMINE   Injectable 25milliGRAM(s) IntraMuscular every 4 hours PRN Rash and/or Itching  oxyCODONE  5 mG/acetaminophen 325 mG 1Tablet(s) Oral every 4 hours PRN Severe Pain (7 - 10)  acetaminophen   Tablet. 325milliGRAM(s) Oral every 4 hours PRN Moderate Pain (4 - 6)      Vital Signs Last 24 Hrs  T(C): 37.8, Max: 38.1 (01-15 @ 17:46)  T(F): 100.1, Max: 100.6 (01-15 @ 17:46)  HR: 104 (96 - 118)  BP: 168/105 (94/60 - 169/81)  BP(mean): 107 (77 - 119)  RR: 20 (11 - 229)  SpO2: 99% (93% - 979%)    PHYSICAL EXAM:      Constitutional: A&Ox3    Respiratory: non labored breathing, no respiratory distress    Cardiovascular: NSR, RRR    Gastrointestinal: Soft, ND, ATTP, wound vac in place, CHECO drain w/ serous drainage                 Incision: wound vac in place    Genitourinary: stone in place    Extremities: (-) edema                  I&O's Detail      LABS:                        7.1    8.0   )-----------( 319      ( 15 Jorge 2017 05:52 )             23.7                 RADIOLOGY & ADDITIONAL STUDIES:

## 2017-01-16 NOTE — PROGRESS NOTE ADULT - ASSESSMENT
56YOF s/p bypass revision following failed SIPS (anastamotic leak), found to have draining intra-abbdominal abscess, resolving acute respiratory failure, RUE DVT, pna and cdiff    - cont care per SICU team  - Resp goal: cap tracheostomy    Neuro: Percocet 2q4 standing,  Lexapro 20mg daily  CVS: MAP > 70, normotensive goals    Pulm: Trach collar, cuff deflated; downsized to 6. Peridex. Duonebs.   FEN/GI: J-tube TF 53cc/hr Osmolite 1.2. Protonix. Zofran PRN. Vit C, MVT.  Zinc sulfate.  Multivitamin.  Rectal tube.  : stone (stg III sacral ulcer)  Endo: no issues   ID: C. diff: Vancomycin PO (1/7) MDR Pseudomonas (sputum and peritoneal fluid) - colistin (12/31- ), meropenem (12/31- ), INH tobramycin (12/31-1/14) \\\ DC: linezolid (1/7-1/9) IV tobra (1/3, 1/4, 1/6), gent (12/26-) Imipenem (12/14--), Vanc (12/13-14), Zosyn (12/3-14), Linezolid (12/24-25),  fluconazole (12/4-25), - H. simplex Acyclovir (12/20-25), micafungin (12/31-1/10 ),   Heme: SCDs, lovenox 80 bid,   Lines/drains: L flank CHECO (1/6-), Feeding J-tube. R PICC (12/21-) to be changed for new PICC ///dc: Left IJ TLC (12/3--), left IJ Juanjo (12/6-12/14).    Wounds: Midline abdominal wound, change wound vac q48 hrs (last 1/11)   Benadryl PRN for itching.

## 2017-01-16 NOTE — PROGRESS NOTE ADULT - SUBJECTIVE AND OBJECTIVE BOX
Interval Events: ON: BRAYDEN, 1/15: OOBC for 4 hours, off c. diff precautions  Patient seen and examined at bedside.      Allergies    sulfa drugs (Unknown)  sulfamethoxazole (Other)    Intolerances        Vital Signs Last 24 Hrs  T(C): 37.8, Max: 38.1 (01-15 @ 17:46)  T(F): 100.1, Max: 100.6 (01-15 @ 17:46)  HR: 117 (93 - 118)  BP: 164/77 (94/60 - 169/81)  BP(mean): 112 (77 - 119)  RR: 14 (11 - 229)  SpO2: 99% (90% - 979%)  I & Os for 24h ending 01-15 @ 07:00  =============================================  IN: 1816 ml / OUT: 1856 ml / NET: -40 ml    I & Os for current day (as of 01-16 @ 06:39)  =============================================  IN: 1599.2 ml / OUT: 2435 ml / NET: -835.8 ml  I & Os for 24h ending 01-15 @ 07:00  =============================================  IN: 1816 ml / OUT: 1856 ml / NET: -40 ml    I & Os for current day (as of 01-16 @ 06:39)  =============================================  IN: 1599.2 ml / OUT: 2435 ml / NET: -835.8 ml        LABS:      CBC Full  -  ( 15 Jorge 2017 05:52 )  WBC Count : 8.0 K/uL  Hemoglobin : 7.1 g/dL  Hematocrit : 23.7 %  Platelet Count - Automated : 319 K/uL  Mean Cell Volume : 85.9 fL  Mean Cell Hemoglobin : 25.7 pg  Mean Cell Hemoglobin Concentration : 30.0 g/dL  Auto Neutrophil # : x  Auto Lymphocyte # : x  Auto Monocyte # : x  Auto Eosinophil # : x  Auto Basophil # : x  Auto Neutrophil % : x  Auto Lymphocyte % : x  Auto Monocyte % : x  Auto Eosinophil % : x  Auto Basophil % : x    RADIOLOGY & ADDITIONAL STUDIES (The following images were personally reviewed): Interval Events:  Patient seen and examined at bedside.      Allergies    sulfa drugs (Unknown)  sulfamethoxazole (Other)    Intolerances        Vital Signs Last 24 Hrs  T(C): 37.8, Max: 38.1 (01-15 @ 17:46)  T(F): 100.1, Max: 100.6 (01-15 @ 17:46)  HR: 117 (93 - 118)  BP: 164/77 (94/60 - 169/81)  BP(mean): 112 (77 - 119)  RR: 14 (11 - 229)  SpO2: 99% (90% - 979%)  I & Os for 24h ending 01-15 @ 07:00  =============================================  IN: 1816 ml / OUT: 1856 ml / NET: -40 ml    I & Os for current day (as of 01-16 @ 06:39)  =============================================  IN: 1599.2 ml / OUT: 2435 ml / NET: -835.8 ml  I & Os for 24h ending 01-15 @ 07:00  =============================================  IN: 1816 ml / OUT: 1856 ml / NET: -40 ml    I & Os for current day (as of 01-16 @ 06:39)  =============================================  IN: 1599.2 ml / OUT: 2435 ml / NET: -835.8 ml        LABS:      CBC Full  -  ( 15 Jorge 2017 05:52 )  WBC Count : 8.0 K/uL  Hemoglobin : 7.1 g/dL  Hematocrit : 23.7 %  Platelet Count - Automated : 319 K/uL  Mean Cell Volume : 85.9 fL  Mean Cell Hemoglobin : 25.7 pg  Mean Cell Hemoglobin Concentration : 30.0 g/dL  Auto Neutrophil # : x  Auto Lymphocyte # : x  Auto Monocyte # : x  Auto Eosinophil # : x  Auto Basophil # : x  Auto Neutrophil % : x  Auto Lymphocyte % : x  Auto Monocyte % : x  Auto Eosinophil % : x  Auto Basophil % : x    RADIOLOGY & ADDITIONAL STUDIES (The following images were personally reviewed):      Physical Exam:       A/p: 56yFemale      Physical Exam:     Neuro: A&Ox3, NAD, grossly neuro intact  Skin: sacral decubitus improving  CV: tachycardia to 100-110s, RR, no MRGs  Pulm: Decreased breath sounds in B/L lower lobes, no WRR  Abd: BS (+), obese, Soft, NT, mild distension, midline wound vac in place with good suction, no surrounding erythema or ecchymosis. Area of induration at previous drain site, draining thin milky fluid, low output.  Nonpainful. Feeding J tube, L flank CHECO in place, no surrounding erythema or    A/p: 56yFemale Interval Events: ON: BRAYDEN, 1/15: OOBC for 4 hours, off c. diff precautions  Patient seen and examined at bedside.      Allergies    sulfa drugs (Unknown)  sulfamethoxazole (Other)    Intolerances        Vital Signs Last 24 Hrs  T(C): 37.8, Max: 38.1 (01-15 @ 17:46)  T(F): 100.1, Max: 100.6 (01-15 @ 17:46)  HR: 117 (93 - 118)  BP: 164/77 (94/60 - 169/81)  BP(mean): 112 (77 - 119)  RR: 14 (11 - 229)  SpO2: 99% (90% - 979%)  I & Os for 24h ending 01-15 @ 07:00  =============================================  IN: 1816 ml / OUT: 1856 ml / NET: -40 ml    I & Os for current day (as of 01-16 @ 06:39)  =============================================  IN: 1599.2 ml / OUT: 2435 ml / NET: -835.8 ml  I & Os for 24h ending 01-15 @ 07:00  =============================================  IN: 1816 ml / OUT: 1856 ml / NET: -40 ml    I & Os for current day (as of 01-16 @ 06:39)  =============================================  IN: 1599.2 ml / OUT: 2435 ml / NET: -835.8 ml        LABS:      CBC Full  -  ( 15 Jorge 2017 05:52 )  WBC Count : 8.0 K/uL  Hemoglobin : 7.1 g/dL  Hematocrit : 23.7 %  Platelet Count - Automated : 319 K/uL  Mean Cell Volume : 85.9 fL  Mean Cell Hemoglobin : 25.7 pg  Mean Cell Hemoglobin Concentration : 30.0 g/dL  Auto Neutrophil # : x  Auto Lymphocyte # : x  Auto Monocyte # : x  Auto Eosinophil # : x  Auto Basophil # : x  Auto Neutrophil % : x  Auto Lymphocyte % : x  Auto Monocyte % : x  Auto Eosinophil % : x  Auto Basophil % : x    RADIOLOGY & ADDITIONAL STUDIES (The following images were personally reviewed):    Physical Exam:     Neuro: A&Ox3, NAD, grossly neuro intact  Skin: sacral decubitus improving  CV: tachycardia to 100-110s, RR, no MRGs  Pulm: Decreased breath sounds in B/L lower lobes, no WRR  Abd: BS (+), obese, Soft, NT, mild distension, midline wound vac in place with good suction, no surrounding erythema or ecchymosis. Area of induration at previous drain site, draining thin milky fluid, low output. Nonpainful. 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 diff    Neuro: Percocet 2q4 standing,  Lexapro 20mg daily  CVS: MAP > 70, normotensive goals    Pulm: Trach collar, cuff deflated; downsized to 6. Peridex. Duonebs.   FEN/GI: J-tube TF 53cc/hr Osmolite 1.2. Protonix. Zofran PRN. Vit C, MVT.  Zinc sulfate.  Multivitamin.  Rectal tube.  : stone (stg III sacral ulcer)  Endo: no issues   ID: C. diff: Vancomycin PO (1/7) MDR Pseudomonas (sputum and peritoneal fluid) - colistin (12/31- ), meropenem (12/31- ), INH tobramycin (12/31-1/14) \\\ DC: linezolid (1/7-1/9) IV tobra (1/3, 1/4, 1/6), gent (12/26-) Imipenem (12/14--), Vanc (12/13-14), Zosyn (12/3-14), Linezolid (12/24-25),  fluconazole (12/4-25), - H. simplex Acyclovir (12/20-25), micafungin (12/31-1/10 ),   Heme: SCDs, lovenox 80 bid,   Lines/drains: L flank CHECO (1/6-), Feeding J-tube. R PICC (12/21-) to be changed for new PICC ///dc: Left IJ TLC (12/3--), left IJ Juanjo (12/6-12/14).    Wounds: Midline abdominal wound, change wound vac q48 hrs (last 1/11)   Benadryl PRN for itching. Interval Events: ON: BRAYDEN, 1/15: OOBC for 4 hours, off c. diff precautions  Patient seen and examined at bedside.      Allergies    sulfa drugs (Unknown)  sulfamethoxazole (Other)    Intolerances: None      Vital Signs Last 24 Hrs  T(C): 37.8, Max: 38.1 (01-15 @ 17:46)  T(F): 100.1, Max: 100.6 (01-15 @ 17:46)  HR: 117 (93 - 118)  BP: 164/77 (94/60 - 169/81)  BP(mean): 112 (77 - 119)  RR: 14 (11 - 229)  SpO2: 99% (90% - 979%)  I & Os for 24h ending 01-15 @ 07:00  =============================================  IN: 1816 ml / OUT: 1856 ml / NET: -40 ml    I & Os for current day (as of 01-16 @ 06:39)  =============================================  IN: 1599.2 ml / OUT: 2435 ml / NET: -835.8 ml  I & Os for 24h ending 01-15 @ 07:00  =============================================  IN: 1816 ml / OUT: 1856 ml / NET: -40 ml    I & Os for current day (as of 01-16 @ 06:39)  =============================================  IN: 1599.2 ml / OUT: 2435 ml / NET: -835.8 ml        LABS:      CBC Full  -  ( 15 Jorge 2017 05:52 )  WBC Count : 8.0 K/uL  Hemoglobin : 7.1 g/dL  Hematocrit : 23.7 %  Platelet Count - Automated : 319 K/uL  Mean Cell Volume : 85.9 fL  Mean Cell Hemoglobin : 25.7 pg  Mean Cell Hemoglobin Concentration : 30.0 g/dL  Auto Neutrophil # : x  Auto Lymphocyte # : x  Auto Monocyte # : x  Auto Eosinophil # : x  Auto Basophil # : x  Auto Neutrophil % : x  Auto Lymphocyte % : x  Auto Monocyte % : x  Auto Eosinophil % : x  Auto Basophil % : x    RADIOLOGY & ADDITIONAL STUDIES (The following images were personally reviewed):    Physical Exam:     Neuro: A&Ox3, NAD, grossly neuro intact  Skin: sacral decubitus improving  CV: tachycardia to 100-110s, RR, no MRGs  Pulm: Decreased breath sounds in B/L lower lobes, no WRR  Abd: BS (+), obese, Soft, NT, mild distension, midline wound vac in place with good suction, no surrounding erythema or ecchymosis. Area of induration at previous drain site, draining thin milky fluid, low output. Nonpainful. 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 diff    Neuro: Percocet 2q4 standing,  Lexapro 20mg daily  CVS: MAP > 70, normotensive goals    Pulm: Trach collar, cuff deflated; downsized to 6. Peridex. Duonebs.   FEN/GI: J-tube TF 53cc/hr Osmolite 1.2. Protonix. Zofran PRN. Vit C, MVT.  Zinc sulfate.  Multivitamin.  Rectal tube.  : stg III sacral ulcer  Endo: no issues   ID: C. diff: Vancomycin PO (1/7) MDR Pseudomonas (sputum and peritoneal fluid) - colistin (12/31- ), meropenem (12/31- ), INH tobramycin (12/31-1/14) \\\ DC: linezolid (1/7-1/9) IV tobra (1/3, 1/4, 1/6), gent (12/26-) Imipenem (12/14--), Vanc (12/13-14), Zosyn (12/3-14), Linezolid (12/24-25),  fluconazole (12/4-25), - H. simplex Acyclovir (12/20-25), micafungin (12/31-1/10 ),   Heme: SCDs, lovenox 80 bid,   Lines/drains: L flank CHECO (1/6-), Feeding J-tube.    Wounds: Midline abdominal wound, change wound vac q48 hrs (last 1/13)   Benadryl PRN for itching. Interval Events: ON: BRAYDEN, 1/15: OOBC for 4 hours, off c. diff precautions  ROS No HA, N/V, SOB, CP.  Patient seen and examined at bedside.      Allergies    sulfa drugs (Unknown)  sulfamethoxazole (Other)    Intolerances: None      Vital Signs Last 24 Hrs  T(C): 37.8, Max: 38.1 (01-15 @ 17:46)  T(F): 100.1, Max: 100.6 (01-15 @ 17:46)  HR: 117 (93 - 118)  BP: 164/77 (94/60 - 169/81)  BP(mean): 112 (77 - 119)  RR: 14 (11 - 229)  SpO2: 99% (90% - 979%)  I & Os for 24h ending 01-15 @ 07:00  =============================================  IN: 1816 ml / OUT: 1856 ml / NET: -40 ml    I & Os for current day (as of 01-16 @ 06:39)  =============================================  IN: 1599.2 ml / OUT: 2435 ml / NET: -835.8 ml  I & Os for 24h ending 01-15 @ 07:00  =============================================  IN: 1816 ml / OUT: 1856 ml / NET: -40 ml    I & Os for current day (as of 01-16 @ 06:39)  =============================================  IN: 1599.2 ml / OUT: 2435 ml / NET: -835.8 ml        LABS:      CBC Full  -  ( 15 Jorge 2017 05:52 )  WBC Count : 8.0 K/uL  Hemoglobin : 7.1 g/dL  Hematocrit : 23.7 %  Platelet Count - Automated : 319 K/uL  Mean Cell Volume : 85.9 fL  Mean Cell Hemoglobin : 25.7 pg  Mean Cell Hemoglobin Concentration : 30.0 g/dL  Auto Neutrophil # : x  Auto Lymphocyte # : x  Auto Monocyte # : x  Auto Eosinophil # : x  Auto Basophil # : x  Auto Neutrophil % : x  Auto Lymphocyte % : x  Auto Monocyte % : x  Auto Eosinophil % : x  Auto Basophil % : x    RADIOLOGY & ADDITIONAL STUDIES (The following images were personally reviewed):    Physical Exam:     Neuro: A&Ox3, NAD, grossly neuro intact  Skin: sacral decubitus improving  CV: tachycardia to 100-110s, RR, no MRGs  Pulm: Decreased breath sounds in B/L lower lobes, no WRR  Abd: BS (+), obese, Soft, NT, mild distension, midline wound vac in place with good suction, no surrounding erythema or ecchymosis. Area of induration at previous drain site, draining thin milky fluid, low output. Nonpainful. Feeding J tube, L flank CHECO in place, no surrounding erythema  Ext no edema  MSK: no cyanosis or clubbing. 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 diff    Neuro: Percocet 2q4 standing,  Lexapro 20mg daily  CVS: MAP > 70, normotensive goals. Perfusion OK.  Pulm: Trach collar, cuff deflated; downsized to cuffed 6. Peridex. Duonebs. After EGD to downsize further.  FEN/GI: J-tube TF 53cc/hr Osmolite 1.2. Protonix. Zofran PRN. Vit C, MVT.  Zinc sulfate.  Multivitamin.  Rectal tube. Await decision re EGD to assess stent.  : stg III sacral ulcer but will try to DC stone today.  Endo: no issues   ID: C. diff: Vancomycin PO (1/7) MDR Pseudomonas (sputum and peritoneal fluid) - colistin (12/31- ), meropenem (12/31- ), INH tobramycin (12/31-1/14) \\\ DC: linezolid (1/7-1/9) IV tobra (1/3, 1/4, 1/6), gent (12/26-) Imipenem (12/14--), Vanc (12/13-14), Zosyn (12/3-14), Linezolid (12/24-25),  fluconazole (12/4-25), - H. simplex Acyclovir (12/20-25), micafungin (12/31-1/10 ),   Heme: SCDs, lovenox 80 bid,   Lines/drains: L flank CHECO (1/6-), Feeding J-tube.    Wounds: Midline abdominal wound, change wound vac q48 hrs (last 1/13)   Benadryl PRN for itching.  Continue PT

## 2017-01-16 NOTE — PROGRESS NOTE ADULT - SUBJECTIVE AND OBJECTIVE BOX
INTERVAL HPI/OVERNIGHT EVENTS: No change    CONSTITUTIONAL:  (+)fever and chills, feels well  EYES:  Negative  blurry vision or double vision  CARDIOVASCULAR:  Negative for chest pain or palpitations  RESPIRATORY:  Negative for cough, wheezing, or SOB   GASTROINTESTINAL:  Negative for nausea, vomiting, diarrhea, constipation, or abdominal pain  GENITOURINARY:  Negative frequency, urgency or dysuria  NEUROLOGIC:  No headache, confusion, dizziness, lightheadedness      ANTIBIOTICS/RELEVANT:    colistimethate IVPB 100milliGRAM(s) IV Intermittent every 12 hours#day 17    meropenem IVPB 2000milliGRAM(s) IV Intermittent every 8 hours    vancomycin    Solution 125milliGRAM(s) Oral every 6 hours      Vital Signs Last 24 Hrs  T(C): 36.5, Max: 38.1 (01-15 @ 17:46)  T(F): 97.7, Max: 100.6 (01-15 @ 17:46)  HR: 98 (96 - 118)  BP: 138/68 (94/60 - 169/81)  BP(mean): 100 (77 - 119)  RR: 18 (12 - 229)  SpO2: 95% (93% - 979%)    PHYSICAL EXAM:  Constitutional:Well-developed, well nourished  Eyes:CL, EOMI  Ear/Nose/Throat: no oral lesion, no sinus tenderness on percussion	  Neck:no JVD, no lymphadenopathy, supple  Respiratory: CTA eileen  Cardiovascular: S1S2 RRR, no murmurs  Gastrointestinal:(+) CHECO and Vac, (+) BS  Extremities: (+) edema        LABS:                        7.1    8.0   )-----------( 319      ( 15 Jorge 2017 05:52 )             23.7                 MICROBIOLOGY:    RADIOLOGY & ADDITIONAL STUDIES:

## 2017-01-17 NOTE — PROGRESS NOTE ADULT - SUBJECTIVE AND OBJECTIVE BOX
INTERVAL HPI/OVERNIGHT EVENTS: Pt remains afebrile. She is trached and is reporting discomfort with ''itching all over her body'', asking for benadryl. She continues to have diarrhea ( rectal tube is still in place. Pt will likely undergo repeat Endo for eval of Stent    ROS: as above, otherwise negative       Allergies    sulfa drugs (Unknown)  sulfamethoxazole (Other)    Intolerances    ANTIBIOTICS/RELEVANT:  antimicrobials  colistimethate IVPB 100milliGRAM(s) IV Intermittent every 12 hours  meropenem IVPB 2000milliGRAM(s) IV Intermittent every 8 hours    immunologic:    MEDICATIONS  (STANDING):  pantoprazole  Injectable 40milliGRAM(s) IV Push daily  ALBUTerol/ipratropium for Nebulization 3milliLiter(s) Nebulizer every 6 hours  ascorbic acid Syrup 500milliGRAM(s) Oral daily  zinc sulfate 220milliGRAM(s) Oral daily  colistimethate IVPB 100milliGRAM(s) IV Intermittent every 12 hours  multivitamin 1Tablet(s) Oral daily  meropenem IVPB 2000milliGRAM(s) IV Intermittent every 8 hours  cyanocobalamin Injectable 1000MICROGram(s) IntraMuscular every 7 days  cholecalciferol 2000Unit(s) Oral daily  collagenase Ointment 1Application(s) Topical daily  enoxaparin Injectable 80milliGRAM(s) SubCutaneous <User Schedule>  thiamine 100milliGRAM(s) Oral daily  vancomycin    Solution 125milliGRAM(s) Oral every 6 hours  escitalopram Solution 20milliGRAM(s) Oral daily  magnesium oxide 400milliGRAM(s) Oral every 4 hours  potassium acid phosphate/sodium acid phosphate tablet (K-PHOS No. 2) 1Tablet(s) Oral every 3 hours    MEDICATIONS  (PRN):  acetaminophen    Suspension 650milliGRAM(s) Enteral Tube every 6 hours PRN For Temp greater than 38 C (100.4 F)  ondansetron Injectable 4milliGRAM(s) IV Push every 6 hours PRN Nausea and/or Vomiting  diphenhydrAMINE   Injectable 25milliGRAM(s) IntraMuscular every 4 hours PRN Rash and/or Itching  oxyCODONE  5 mG/acetaminophen 325 mG 1Tablet(s) Oral every 4 hours PRN Severe Pain (7 - 10)  acetaminophen   Tablet. 325milliGRAM(s) Oral every 4 hours PRN Moderate Pain (4 - 6)      ICU Vital Signs Last 24 Hrs  T(C): 37.1, Max: 38.2 (01-16 @ 21:45)  T(F): 98.7, Max: 100.7 (01-16 @ 21:45)  HR: 94 (90 - 118)  BP: 150/80 (126/72 - 175/88)  BP(mean): 113 (99 - 124)  ABP: --  ABP(mean): --  RR: 27 (16 - 27)  SpO2: 91% (91% - 100%)      PHYSICAL EXAM:  General - awake, alert, in mild distress reporting itching, obese  HEENT - MMM, PERRL, EOMI, no oral lesions noted   CV - S1, S2 RRR no murmurs   Resp - Decreased breath sounds b/l, rhonchi at the bases, +trach  Abdomen - soft, obese +BS, tenderness to palpation around wound vac site with mild erythema, +wound vac, +J tube, +rectal tube   Extremities- moving all extremities, wwp, +2 pulses distal b/l       LABS:                                           7.2    7.4   )-----------( 275      ( 17 Jan 2017 05:48 )             23.8   17 Jan 2017 05:48    137    |  100    |  12     ----------------------------<  99     3.8     |  28     |  0.40     Ca    10.2       17 Jan 2017 05:48  Phos  2.1       17 Jan 2017 05:48  Mg     1.5       17 Jan 2017 05:48          MICROBIOLOGY:  Bl Cx 12/16, 12/24: NGTD   Surgical Swab (01.09.17 @ 20:04) Few Pseudomonas aeruginosa (multi drug resistant) Pseudomonas aeruginosa (multi drug resistant)      Abd fluid 12/11: strep milleri, moderate yeast     Bronchial wash 12/13, 12/24: MDR pseudomonas  Bronchial wash 12/14: yeast    Sputum 12/27: Moderate Pseudomonas, MDR        Pleural fluid: yellow, , glucose 79, Total protein 4.4, Nuc cell count 570, RBC 4550, segmented granulocytes 22, lymphocytes 55, monocyte/macrophage 11, eosinophil 7, mesothelial cells 5    Pleural fluid Cx 12/28: No growth    Abd fluid CHECO 12/30: MDR pseudomonas           RADIOLOGY & ADDITIONAL STUDIES:  CT c/a/p 1/3: Complete collapse of the right lower lobe. Complete collapse of the left   lower lobe. Small bilateral pleural effusions. 1. Persistent localized peripherally enhancing fluid collections within the pelvis most consistent with intraperitoneal abscesses. These have not significantly changed in size   when compared to the prior  Study. 2. S/p gastrectomy and gastrojejunal anastomosis with endoluminal stent in place. There has been removal of the nasogastric tube. 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 4. Grossly stable small bore percutaneous surgical drain in the right mid abdomen terminating in the left midabdomen with interval removal of the larger bore percutaneous  drainage catheter previously noted in the right midabdomen.  5. Stable hepatosplenomegaly.  6. Stable appearance of the left kidney demonstrating dilatation of the left renal pelvis versus a  parapelvic cyst and small 1 cm left cortical cyst. 7. Slightly decreasing subcutaneous edema      CXR 1/1: mild improvement in congestion and/or infiltrates     CT c/a/p w/ IV and green dye contrast 12/27: s/p gastrectomy and gastrojejunal anastomosis with a stent in place. 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  A few rim-enhancing fluid collections in the lower abdomen and pelvis which might be infected.Moderate to large left pleural effusion, enlarged since prior study. Small right pleural effusion. Extensive atelectatic changes in the lungs,   left greater than right.    CT c/a/p 12/21: 1.  Interval removal of endoluminal stent from the distal esophagus   extending to the jejunal loop. Interval placement of an esophageal stent. 2.  Slight interval decrease in small to moderate ascites, particularly   in the left upper quadrant surrounding the anastomosis. The largest pocket measures 1.3 x 6.6 cm in the midline upper pelvis, similar to the   prior study.3.  Further interval decrease in free air, with a a few punctate foci   remaining.4.  Slight interval decrease in small right pleural effusion. No change   in small left pleural effusion. Associated atelectasis bilaterally,   unchanged.5.  New anterior abdominal wall defect at the surgical incision site, presumably from interval debridement. 6.  Mild cardiomegaly and trace pericardial effusion, unchanged.  7.  Diffuse anasarca.      CXR 12/25: Improvement of opacification left hemithorax in comparison to   prior examination of the chest 12/25/2016. Bilateral effusions.   Underlying infiltrates cannot be excluded    CT ABDOMEN/PELVIS 01/12: Multiple intra-abdominal abscesses, most of which have decreased in size. The abscess posterior to the second portion of the duodenum has increased in size slightly. Persistent bilateral lobar collapse. Bilateral pleural effusions. INTERVAL HPI/OVERNIGHT EVENTS: Pt remains afebrile. She is trached and is reporting discomfort with ''itching all over her body'', asking for benadryl. She continues to have diarrhea ( rectal tube is still in place. Pt will likely undergo repeat Endo for eval of Stent    ROS: as above, otherwise negative     Allergies    sulfa drugs (Unknown)  sulfamethoxazole (Other)    Intolerances    ANTIBIOTICS/RELEVANT:  antimicrobials  colistimethate IVPB 100milliGRAM(s) IV Intermittent every 12 hours  meropenem IVPB 2000milliGRAM(s) IV Intermittent every 8 hours    immunologic:    MEDICATIONS  (STANDING):  pantoprazole  Injectable 40milliGRAM(s) IV Push daily  ALBUTerol/ipratropium for Nebulization 3milliLiter(s) Nebulizer every 6 hours  ascorbic acid Syrup 500milliGRAM(s) Oral daily  zinc sulfate 220milliGRAM(s) Oral daily  colistimethate IVPB 100milliGRAM(s) IV Intermittent every 12 hours  multivitamin 1Tablet(s) Oral daily  meropenem IVPB 2000milliGRAM(s) IV Intermittent every 8 hours  cyanocobalamin Injectable 1000MICROGram(s) IntraMuscular every 7 days  cholecalciferol 2000Unit(s) Oral daily  collagenase Ointment 1Application(s) Topical daily  enoxaparin Injectable 80milliGRAM(s) SubCutaneous <User Schedule>  thiamine 100milliGRAM(s) Oral daily  vancomycin    Solution 125milliGRAM(s) Oral every 6 hours  escitalopram Solution 20milliGRAM(s) Oral daily  magnesium oxide 400milliGRAM(s) Oral every 4 hours  potassium acid phosphate/sodium acid phosphate tablet (K-PHOS No. 2) 1Tablet(s) Oral every 3 hours    MEDICATIONS  (PRN):  acetaminophen    Suspension 650milliGRAM(s) Enteral Tube every 6 hours PRN For Temp greater than 38 C (100.4 F)  ondansetron Injectable 4milliGRAM(s) IV Push every 6 hours PRN Nausea and/or Vomiting  diphenhydrAMINE   Injectable 25milliGRAM(s) IntraMuscular every 4 hours PRN Rash and/or Itching  oxyCODONE  5 mG/acetaminophen 325 mG 1Tablet(s) Oral every 4 hours PRN Severe Pain (7 - 10)  acetaminophen   Tablet. 325milliGRAM(s) Oral every 4 hours PRN Moderate Pain (4 - 6)      ICU Vital Signs Last 24 Hrs  T(C): 37.1, Max: 38.2 (01-16 @ 21:45)  T(F): 98.7, Max: 100.7 (01-16 @ 21:45)  HR: 94 (90 - 118)  BP: 150/80 (126/72 - 175/88)  BP(mean): 113 (99 - 124)  ABP: --  ABP(mean): --  RR: 27 (16 - 27)  SpO2: 91% (91% - 100%)      PHYSICAL EXAM:  General - awake, alert, in mild distress reporting itching, obese  HEENT - MMM, PERRL, EOMI, no oral lesions noted   CV - S1, S2 RRR no murmurs   Resp - Decreased breath sounds b/l, rhonchi at the bases, +trach  Abdomen - soft, obese +BS, tenderness to palpation around wound vac site with mild erythema, +wound vac, +J tube, +rectal tube   Extremities- moving all extremities, wwp, +2 pulses distal b/l       LABS:                                           7.2    7.4   )-----------( 275      ( 17 Jan 2017 05:48 )             23.8   17 Jan 2017 05:48    137    |  100    |  12     ----------------------------<  99     3.8     |  28     |  0.40     Ca    10.2       17 Jan 2017 05:48  Phos  2.1       17 Jan 2017 05:48  Mg     1.5       17 Jan 2017 05:48          MICROBIOLOGY:  Bl Cx 12/16, 12/24: NGTD   Surgical Swab (01.09.17 @ 20:04) Few Pseudomonas aeruginosa (multi drug resistant) Pseudomonas aeruginosa (multi drug resistant)      Abd fluid 12/11: strep milleri, moderate yeast     Bronchial wash 12/13, 12/24: MDR pseudomonas  Bronchial wash 12/14: yeast    Sputum 12/27: Moderate Pseudomonas, MDR        Pleural fluid: yellow, , glucose 79, Total protein 4.4, Nuc cell count 570, RBC 4550, segmented granulocytes 22, lymphocytes 55, monocyte/macrophage 11, eosinophil 7, mesothelial cells 5    Pleural fluid Cx 12/28: No growth    Abd fluid CHECO 12/30: MDR pseudomonas           RADIOLOGY & ADDITIONAL STUDIES:  CT c/a/p 1/3: Complete collapse of the right lower lobe. Complete collapse of the left   lower lobe. Small bilateral pleural effusions. 1. Persistent localized peripherally enhancing fluid collections within the pelvis most consistent with intraperitoneal abscesses. These have not significantly changed in size   when compared to the prior  Study. 2. S/p gastrectomy and gastrojejunal anastomosis with endoluminal stent in place. There has been removal of the nasogastric tube. 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 4. Grossly stable small bore percutaneous surgical drain in the right mid abdomen terminating in the left midabdomen with interval removal of the larger bore percutaneous  drainage catheter previously noted in the right midabdomen.  5. Stable hepatosplenomegaly.  6. Stable appearance of the left kidney demonstrating dilatation of the left renal pelvis versus a  parapelvic cyst and small 1 cm left cortical cyst. 7. Slightly decreasing subcutaneous edema      CXR 1/1: mild improvement in congestion and/or infiltrates     CT c/a/p w/ IV and green dye contrast 12/27: s/p gastrectomy and gastrojejunal anastomosis with a stent in place. 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  A few rim-enhancing fluid collections in the lower abdomen and pelvis which might be infected.Moderate to large left pleural effusion, enlarged since prior study. Small right pleural effusion. Extensive atelectatic changes in the lungs,   left greater than right.    CT c/a/p 12/21: 1.  Interval removal of endoluminal stent from the distal esophagus   extending to the jejunal loop. Interval placement of an esophageal stent. 2.  Slight interval decrease in small to moderate ascites, particularly   in the left upper quadrant surrounding the anastomosis. The largest pocket measures 1.3 x 6.6 cm in the midline upper pelvis, similar to the   prior study.3.  Further interval decrease in free air, with a a few punctate foci   remaining.4.  Slight interval decrease in small right pleural effusion. No change   in small left pleural effusion. Associated atelectasis bilaterally,   unchanged.5.  New anterior abdominal wall defect at the surgical incision site, presumably from interval debridement. 6.  Mild cardiomegaly and trace pericardial effusion, unchanged.  7.  Diffuse anasarca.      CXR 12/25: Improvement of opacification left hemithorax in comparison to   prior examination of the chest 12/25/2016. Bilateral effusions.   Underlying infiltrates cannot be excluded    CT ABDOMEN/PELVIS 01/12: Multiple intra-abdominal abscesses, most of which have decreased in size. The abscess posterior to the second portion of the duodenum has increased in size slightly. Persistent bilateral lobar collapse. Bilateral pleural effusions.

## 2017-01-17 NOTE — PROGRESS NOTE ADULT - SUBJECTIVE AND OBJECTIVE BOX
Interval Events: Passed TOV  Patient seen and examined at bedside.      Allergies    sulfa drugs (Unknown)  sulfamethoxazole (Other)    Intolerances: None        Vital Signs Last 24 Hrs  T(C): 37.7, Max: 38.2 (01-16 @ 21:45)  T(F): 99.9, Max: 100.7 (01-16 @ 21:45)  HR: 96 (96 - 118)  BP: 159/74 (126/72 - 175/88)  BP(mean): 111 (98 - 124)  RR: 22 (16 - 25)  SpO2: 95% (91% - 100%)  I & Os for 24h ending 01-16 @ 07:00  =============================================  IN: 1652.2 ml / OUT: 2610 ml / NET: -957.8 ml    I & Os for current day (as of 01-17 @ 06:34)  =============================================  IN: 1522 ml / OUT: 815 ml / NET: 707 ml  I & Os for 24h ending 01-16 @ 07:00  =============================================  IN: 1652.2 ml / OUT: 2610 ml / NET: -957.8 ml    I & Os for current day (as of 01-17 @ 06:34)  =============================================  IN: 1522 ml / OUT: 815 ml / NET: 707 ml        LABS:      CBC Full  -  ( 17 Jan 2017 05:48 )  WBC Count : 7.4 K/uL  Hemoglobin : 7.2 g/dL  Hematocrit : 23.8 %  Platelet Count - Automated : 275 K/uL  Mean Cell Volume : 85.0 fL  Mean Cell Hemoglobin : 25.7 pg  Mean Cell Hemoglobin Concentration : 30.3 g/dL  Auto Neutrophil # : x  Auto Lymphocyte # : x  Auto Monocyte # : x  Auto Eosinophil # : x  Auto Basophil # : x  Auto Neutrophil % : x  Auto Lymphocyte % : x  Auto Monocyte % : x  Auto Eosinophil % : x  Auto Basophil % : x    17 Jan 2017 05:48    137    |  100    |  12     ----------------------------<  99     3.8     |  28     |  0.40     Ca    10.2       17 Jan 2017 05:48  Phos  2.1       17 Jan 2017 05:48  Mg     1.5       17 Jan 2017 05:48        RADIOLOGY & ADDITIONAL STUDIES (The following images were personally reviewed):      Physical Exam:     Neuro: A&Ox3, NAD, grossly neuro intact  Skin: sacral decubitus improving  CV: tachycardia to 100-110s, RR, no MGRs  Pulm: Decreased breath sounds in B/L lower lobes, no WRR  Abd: BS (+), obese, Soft, NT, mild distension, midline wound vac in place with good suction, no surrounding erythema or ecchymosis. Area of induration at previous drain site, draining thin milky fluid, low output. Nonpainful. Feeding J tube, L flank CHECO in place, no surrounding erythema  Ext: no edema  MSK: no cyanosis or clubbing. 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 diff    Neuro: Percocet 2q4 PRN, tylenol q4 PRN,  Lexapro 20mg daily  CVS: MAP > 70, normotensive goals. Perfusion OK.  Pulm: Trach collar, cuff deflated; downsized to cuffed 6. Peridex. Duonebs. After EGD to downsize further.  FEN/GI: J-tube TF 53cc/hr Osmolite 1.2. Protonix. Zofran PRN. Vit C, MVT.  Zinc sulfate.  Multivitamin.  Rectal tube. Await decision re EGD to assess stent.  : stg III sacral ulcer  Endo: no issues   ID: C. diff: Vancomycin PO (1/7) MDR Pseudomonas (sputum and peritoneal fluid) - colistin (12/31- ), meropenem (12/31- ), INH tobramycin (12/31-1/14) \\\ DC: linezolid (1/7-1/9) IV tobra (1/3, 1/4, 1/6), gent (12/26-) Imipenem (12/14--), Vanc (12/13-14), Zosyn (12/3-14), Linezolid (12/24-25),  fluconazole (12/4-25), - H. simplex Acyclovir (12/20-25), micafungin (12/31-1/10 ),   Heme: SCDs, lovenox 80 bid,   Lines/drains: L flank CHECO (1/6-), Feeding J-tube.    Wounds: Midline abdominal wound, change wound vac q48 hrs (last 1/13)   Benadryl PRN for itching.  Continue PT Interval Events: Passed TOV  Patient seen and examined at bedside.      Allergies    sulfa drugs (Unknown)  sulfamethoxazole (Other)    Intolerances: None        Vital Signs Last 24 Hrs  T(C): 37.7, Max: 38.2 (01-16 @ 21:45)  T(F): 99.9, Max: 100.7 (01-16 @ 21:45)  HR: 96 (96 - 118)  BP: 159/74 (126/72 - 175/88)  BP(mean): 111 (98 - 124)  RR: 22 (16 - 25)  SpO2: 95% (91% - 100%)  I & Os for 24h ending 01-16 @ 07:00  =============================================  IN: 1652.2 ml / OUT: 2610 ml / NET: -957.8 ml    I & Os for current day (as of 01-17 @ 06:34)  =============================================  IN: 1522 ml / OUT: 815 ml / NET: 707 ml  I & Os for 24h ending 01-16 @ 07:00  =============================================  IN: 1652.2 ml / OUT: 2610 ml / NET: -957.8 ml    I & Os for current day (as of 01-17 @ 06:34)  =============================================  IN: 1522 ml / OUT: 815 ml / NET: 707 ml        LABS:      CBC Full  -  ( 17 Jan 2017 05:48 )  WBC Count : 7.4 K/uL  Hemoglobin : 7.2 g/dL  Hematocrit : 23.8 %  Platelet Count - Automated : 275 K/uL  Mean Cell Volume : 85.0 fL  Mean Cell Hemoglobin : 25.7 pg  Mean Cell Hemoglobin Concentration : 30.3 g/dL  Auto Neutrophil # : x  Auto Lymphocyte # : x  Auto Monocyte # : x  Auto Eosinophil # : x  Auto Basophil # : x  Auto Neutrophil % : x  Auto Lymphocyte % : x  Auto Monocyte % : x  Auto Eosinophil % : x  Auto Basophil % : x    17 Jan 2017 05:48    137    |  100    |  12     ----------------------------<  99     3.8     |  28     |  0.40     Ca    10.2       17 Jan 2017 05:48  Phos  2.1       17 Jan 2017 05:48  Mg     1.5       17 Jan 2017 05:48        RADIOLOGY & ADDITIONAL STUDIES (The following images were personally reviewed):      Physical Exam:     Neuro: A&Ox3, NAD, grossly neuro intact  Skin: sacral decubitus improving  CV: tachycardia to 100-110s, RR, no MGRs  Pulm: Decreased breath sounds in B/L lower lobes, no WRR  Abd: BS (+), obese, Soft, NT, mild distension, midline wound vac in place with good suction, no surrounding erythema or ecchymosis. Area of induration at previous drain site, draining thin milky fluid, low output. Nonpainful. Feeding J tube, L flank CHECO in place, no surrounding erythema  Ext: no edema  MSK: no cyanosis or clubbing. 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 diff    Neuro: Percocet 2q4 PRN, tylenol q4 PRN,  Lexapro 20mg daily  CVS: MAP > 70, normotensive goals. Perfusion OK.  Pulm: Trach collar, cuff deflated; downsized to cuffed 6. Peridex. Duonebs. After EGD to downsize further.  FEN/GI: J-tube TF 53cc/hr Osmolite 1.2. Protonix. Zofran PRN. Vit C, MVT.  Zinc sulfate.  Multivitamin.  Rectal tube. Await decision re EGD to assess stent.  : stg III sacral ulcer  Endo: no issues   ID: C. diff: Vancomycin PO (1/7) MDR Pseudomonas (sputum and peritoneal fluid) - colistin (12/31- ), meropenem (12/31- ), INH tobramycin (12/31-1/14) \\\ DC: linezolid (1/7-1/9) IV tobra (1/3, 1/4, 1/6), gent (12/26-) Imipenem (12/14--), Vanc (12/13-14), Zosyn (12/3-14), Linezolid (12/24-25),  fluconazole (12/4-25), - H. simplex Acyclovir (12/20-25), micafungin (12/31-1/10 ),   Heme: SCDs, lovenox 80 bid,   Lines/drains: L flank CHECO (1/6-), Feeding J-tube.    Wounds: Midline abdominal wound, change wound vac q48 hrs (last 1/16)   Benadryl PRN for itching.  Continue PT

## 2017-01-17 NOTE — PROGRESS NOTE ADULT - SUBJECTIVE AND OBJECTIVE BOX
continues to improve  plan is to repeat endoscopy and pull stent and look at mucosa and determine if need to replace the stent  then will consider decanulating

## 2017-01-17 NOTE — PROGRESS NOTE ADULT - ASSESSMENT
The goal drainage output is <10cc per 24h for at least 2 consecutive days. Will continue to follow.    Dennis Snell, PGY-2  [phone]

## 2017-01-17 NOTE — PROGRESS NOTE ADULT - ASSESSMENT
56 year old critical patient s/p SIPs procedure with free air concerning for anastomotic leak s/p emergent ex lap with abdominal washout found to have leak of esophagojejunostomy which they could not repair due to its location.  She is s/p esophageal stent placement, peritoneal washout and internal double pigtail stent placement. 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 She has had repeated bronchoscopies with 12/13 and 12/24 bronchial wash growing MDR pseudomonas. She had a pigtail placed, roughly 600cc fluid removed and chest tube removed. She had a CT scan which showed persistent fluid collections (abdominal abscesses) and collapsed lung b/l. Pt is now S/P IR drainage of abdominal collection. Repeat CT of the abdomen shows Multiple intra-abdominal abscesses, most of which have decreased in size. The abscess posterior to the second portion of the duodenum has increased in size slightly. Patient remains afebrile. She will be getting an Endoscopy soon for stent evaluation     - Continue Treating XDR pseudomonas with colistin (low dose) (12/31-) and synergistic effect with meropenem (which also has GNR +strep milleri coverage)   -As Colistin is nephrotoxic and patient getting studies with contrast, close attention must be paid to daily  renal clearance  -Continue with PO Vanco for C Diff (01/7- 56 year old critical patient s/p SIPs procedure with free air concerning for anastomotic leak s/p emergent ex lap with abdominal washout found to have leak of esophagojejunostomy which they could not repair due to its location.  She is s/p esophageal stent placement, peritoneal washout and internal double pigtail stent placement. 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 She has had repeated bronchoscopies with 12/13 and 12/24 bronchial wash growing MDR pseudomonas. She had a pigtail placed, roughly 600cc fluid removed and chest tube removed. She had a CT scan which showed persistent fluid collections (abdominal abscesses) and collapsed lung b/l. Pt is now S/P IR drainage of abdominal collection. Repeat CT of the abdomen shows Multiple intra-abdominal abscesses, most of which have decreased in size. The abscess posterior to the second portion of the duodenum has increased in size slightly. Patient remains afebrile. She will be getting an Endoscopy soon for stent evaluation     - Continue Treating XDR pseudomonas with colistin (low dose) (12/31-) and synergistic effect with meropenem (which also has GNR +strep milleri coverage)   -As Colistin is nephrotoxic and patient getting studies with contrast, close attention must be paid to daily  renal clearance  -Continue with PO Vanco for C Diff (01/06-

## 2017-01-17 NOTE — PROGRESS NOTE ADULT - SUBJECTIVE AND OBJECTIVE BOX
56F s/p CT-guided drainage of an abdominal abscess. Pt has been Afebrile with a WBC of 7.4. Drain output has been 10cc in the previous 24h with none recorded for the past 24h, however, there is approximately 25cc of purulent drainage in the bulb currently. The drain was flushed with 3cc of normal saline without resistance.

## 2017-01-18 NOTE — PROGRESS NOTE ADULT - SUBJECTIVE AND OBJECTIVE BOX
S: No new issues/events overnight, no new med c/o    O: ICU Vital Signs Last 24 Hrs  T(F): 98.7, Max: 99.1 (01-18 @ 05:37)  HR: 94 (85 - 100)  BP: 140/77 (125/70 - 176/92)  BP(mean): 107 (90 - 141)  ABP: --  RR: 22 (12 - 29)  SpO2: 97% (92% - 100%)  Wt(kg): --    PHYSICAL EXAM:     Neurological: AAOx3, CNII-XII intact,  strength 5/5 b/l  Cardiovascular: RRR  Respiratory: CTA  Gastrointestinal: soft, NT, ND, BS+, midline vac c/d/i, left sided CHECO. J-tube c/d/i, no erythema/pus.   Extremities: warm, no dependent edema  Vascular: no cyanosis/erythema    LABS:    17 Jan 2017 05:48    137    |  100    |  12     ----------------------------<  99     3.8     |  28     |  0.40     Ca    10.2       17 Jan 2017 05:48  Phos  2.1       17 Jan 2017 05:48  Mg     1.5       17 Jan 2017 05:48                          7.2    7.4   )-----------( 275      ( 17 Jan 2017 05:48 )             23.8   PT/INR - ( 18 Jan 2017 05:17 )   PT: 14.0 sec;   INR: 1.26          PTT - ( 18 Jan 2017 05:17 )  PTT:43.0 sec  CAPILLARY BLOOD GLUCOSE    MEDICATIONS  (STANDING):  pantoprazole  Injectable 40milliGRAM(s) IV Push daily  ALBUTerol/ipratropium for Nebulization 3milliLiter(s) Nebulizer every 6 hours  ascorbic acid Syrup 500milliGRAM(s) Oral daily  zinc sulfate 220milliGRAM(s) Oral daily  colistimethate IVPB 100milliGRAM(s) IV Intermittent every 12 hours  multivitamin 1Tablet(s) Oral daily  meropenem IVPB 2000milliGRAM(s) IV Intermittent every 8 hours  cyanocobalamin Injectable 1000MICROGram(s) IntraMuscular every 7 days  cholecalciferol 2000Unit(s) Oral daily  collagenase Ointment 1Application(s) Topical daily  enoxaparin Injectable 80milliGRAM(s) SubCutaneous <User Schedule>  thiamine 100milliGRAM(s) Oral daily  vancomycin    Solution 125milliGRAM(s) Oral every 6 hours  escitalopram Solution 20milliGRAM(s) Oral daily  hydrocortisone 1% Cream 1Application(s) Topical two times a day  labetalol Injectable 10milliGRAM(s) IV Push once    MEDICATIONS  (PRN):  acetaminophen    Suspension 650milliGRAM(s) Enteral Tube every 6 hours PRN For Temp greater than 38 C (100.4 F)  ondansetron Injectable 4milliGRAM(s) IV Push every 6 hours PRN Nausea and/or Vomiting  diphenhydrAMINE   Injectable 25milliGRAM(s) IntraMuscular every 4 hours PRN Rash and/or Itching  oxyCODONE  5 mG/acetaminophen 325 mG 1Tablet(s) Oral every 4 hours PRN Severe Pain (7 - 10)  acetaminophen   Tablet. 325milliGRAM(s) Oral every 4 hours PRN Moderate Pain (4 - 6)      Yu:	  [x ] None	[ ] Daily Yu Order Placed	   Indication:	  [ ] Strict I and O's    [ ] Obstruction     [ ] Incontinence + Stage 3 or 4 Decubitus  Central Line:  [x ] None	   [ ]  Medication / TPN Administration     [ ] No Peripheral IV

## 2017-01-18 NOTE — PROGRESS NOTE ADULT - SUBJECTIVE AND OBJECTIVE BOX
56F s/p CT-guided drainage of an abdominal abscess. Pt has no complaints at the moment. She remains afebrile over night. Drain is draining purulent drainage and output was 45cc in the past 24h. Drain was flushed with 3cc of NS without resistance.

## 2017-01-18 NOTE — PROGRESS NOTE ADULT - ASSESSMENT
56YOF s/p bypass revision following failed SIPS (anastamotic leak), found to have draining intra-abbdominal abscess, resolving acute respiratory failure, RUE DVT, pna and cdiff    Neuro: Percocet 2q4 PRN, tylenol q4 PRN,  Lexapro 20mg daily  CVS: MAP > 70, normotensive goals    Pulm: Trach collar, cuff deflated; downsized to 6. Peridex. Duonebs.   FEN/GI: J-tube TF 53cc/hr Osmolite 1.2. Protonix. Zofran PRN. Vit C, MVT.  Zinc sulfate.  Multivitamin.  Rectal tube. EGD today.   : stg III sacral ulcer, voids/incont   Endo: no issues   ID: C. diff: Vancomycin PO (1/7) MDR Pseudomonas (sputum and peritoneal fluid) - colistin (12/31- ), meropenem (12/31- ), INH tobramycin (12/31-1/14) \\\ DC: linezolid (1/7-1/9) IV tobra (1/3, 1/4, 1/6), gent (12/26-) Imipenem (12/14--), Vanc (12/13-14), Zosyn (12/3-14), Linezolid (12/24-25),  fluconazole (12/4-25), - H. simplex Acyclovir (12/20-25), micafungin (12/31-1/10 ),   Heme: SCDs, lovenox 80 bid,   Lines/drains: L flank CHECO (1/6-), Feeding J-tube.  Wounds: Midline abdominal wound, change wound vac q48 hrs (last 1/16)   Benadryl PRN for itching.

## 2017-01-18 NOTE — PROGRESS NOTE ADULT - ASSESSMENT
Output remains elevated. Goal output <10cc per 24h. Will continue to follow.    Dennis Snell MD  [phone]

## 2017-01-19 NOTE — SWALLOW BEDSIDE ASSESSMENT ADULT - SPECIFY REASON(S)
56 y.o female s/p bypass revision following failed sips.  Intra-abdominal abcess, leak of esophogojejunostomy s/p stent, colon resection, resp failure req. int/mv, trach.

## 2017-01-19 NOTE — PROGRESS NOTE ADULT - PROBLEM SELECTOR PROBLEM 3
DVT (deep venous thrombosis)
Obesity
DVT (deep venous thrombosis)
Obesity
Hypernatremia
Hypokalemia
DVT (deep venous thrombosis)
Hypertension
Hypokalemia
Obesity
Hypernatremia

## 2017-01-19 NOTE — PROGRESS NOTE ADULT - ATTENDING PHYSICIAN: I WAS PHYSICALLY PRESENT FOR THE E/M SERVICE PROVIDED. I AGREE WITH ABOVE HISTORY, PHYSICAL, AND PLAN WHICH I HAVE REVIEWED AND EDITED WHERE APPROPRIATE. I WAS PHYSICALLY PRESENT FOR THE KEY PORTIONS OF THE SERVICE PROVIDED

## 2017-01-19 NOTE — PROGRESS NOTE ADULT - PROBLEM SELECTOR PROBLEM 2
DVT (deep venous thrombosis)
Obesity
DVT (deep venous thrombosis)
Obesity
Anemia
DVT (deep venous thrombosis)
DVT (deep venous thrombosis)
Obesity
Pleural effusion
Pleural effusion
Anemia
Anemia
Pleural effusion
Pleural effusion
Anemia
Pleural effusion
Pneumonia, bacterial

## 2017-01-19 NOTE — PROGRESS NOTE ADULT - SUBJECTIVE AND OBJECTIVE BOX
S: Pt reports no complaints this morning, would like to drink water. No CP/SOB/abd pain.     Vitals: Vital Signs Last 24 Hrs  T(C): 37.2, Max: 37.2 (01-19 @ 01:59)  T(F): 99, Max: 99 (01-19 @ 05:35)  HR: 84 (82 - 98)  BP: 188/90 (125/70 - 188/90)  BP(mean): 130 (90 - 131)  RR: 24 (15 - 29)  SpO2: 98% (90% - 99%)    CAPILLARY BLOOD GLUCOSE      I & Os for 24h ending 01-19 @ 07:00  =============================================  IN: 1050.9 ml / OUT: 1450 ml / NET: -399.1 ml    I & Os for current day (as of 01-19 @ 08:49)  =============================================  IN: 53 ml / OUT: 0 ml / NET: 53 ml          Labs:                         7.5    6.7   )-----------( 268      ( 19 Jan 2017 07:38 )             24.2   19 Jan 2017 07:38    137    |  100    |  14     ----------------------------<  84     4.7     |  29     |  0.33     Ca    9.8        19 Jan 2017 07:38  Phos  2.4       19 Jan 2017 07:38  Mg     1.4       19 Jan 2017 07:38    PT/INR - ( 18 Jan 2017 05:17 )   PT: 14.0 sec;   INR: 1.26          PTT - ( 18 Jan 2017 05:17 )  PTT:43.0 sec    Electrolytes repleated to goals as follows: Potassium 4, Phosphorus 3 and Magnesium 2 where appropriate and necessary    Exam:  Neuro: A&Ox3, NAD, grossly neuro intact  Skin: sacral decubitus improving  CV: RRR, no MRGs  Pulm: CTAB, decreased lower field breath sounds, no wheezes, rales ronchi  Abd: BS (+), obese, Soft, NT, mild distension, midline wound vac in place with good suction, no surrounding erythema or ecchymosis. Area of induration at previous drain site, draining thin milky fluid, low output.  Nonpainful. Feeding J tube, L flank CHECO in place, no surrounding erythema or ecchymosis.   Ext: minimal edema in all 4 extremities  Vasc: Extremities warm to palpation, 2+ pulses - radial and PT

## 2017-01-19 NOTE — PROGRESS NOTE ADULT - ASSESSMENT
56YOF s/p bypass revision following failed SIPS (anastamotic leak), found to have draining intra-abbdominal abscess, resolving acute respiratory failure, RUE DVT, pna and cdiff    Neuro: Percocet 2q4 PRN, tylenol q4 PRN,  Lexapro 20mg daily  CVS: MAP > 70, normotensive goals    Pulm: Trach collar, cuff deflated; downsized to 6. Duonebs. Will attempt to decannulate today  FEN/GI: J-tube TF 53cc/hr Osmolite 1.2. Protonix. Zofran PRN. Vit C, MVT.  Zinc sulfate.  Multivitamin.  Rectal tube.  : stg III sacral ulcer, voids/incont   Endo: ISS  ID: C. diff: Vancomycin PO (1/7) MDR Pseudomonas (sputum and peritoneal fluid) - colistin (12/31- ), meropenem (12/31- ), INH tobramycin (12/31-1/14) \\\ DC: linezolid (1/7-1/9) IV tobra (1/3, 1/4, 1/6), gent (12/26-) Imipenem (12/14--), Vanc (12/13-14), Zosyn (12/3-14), Linezolid (12/24-25),  fluconazole (12/4-25), - H. simplex Acyclovir (12/20-25), micafungin (12/31-1/10 ),   Heme: SCDs, lovenox 80 bid,   Lines/drains: L flank CHECO (1/6-), Feeding J-tube.  Wounds: Midline abdominal wound, change wound vac q48 hrs (last 1/16)     Likely transfer to telemetry

## 2017-01-19 NOTE — SWALLOW BEDSIDE ASSESSMENT ADULT - ADDITIONAL RECOMMENDATIONS
Patient decannulated this morning.  Receiving O2 via face mask.  OM exam revealed structure and function WNL.  Vocal quality smooth - reduced volume, but audible.  Patient reports no appetite for PO.  Concern for vomiting with PO, which preceded this admission.  Pt. accepted ~2 oz of thins and a cracker.  Oral phase marked by adequate receipt, containment and clearance.  Pharyngeal swallow trigger appeared timely.  Pt. was without overt s/s of aspiration.  Pt. indicating that she would prefer to start on purees, can advance to mechanical soft per pt.'s discretion.  This service will follow for tolerance.  Suspect PO will be poor due to anxiety and lack of appetite.  MD reports there are no GI restrictions.

## 2017-01-20 NOTE — PROGRESS NOTE ADULT - SUBJECTIVE AND OBJECTIVE BOX
INTERVAL HPI/OVERNIGHT EVENTS: Pt remains afebrile. She has been stepped down to the F- She got decannulated and her Stent was removed by GI on  1/18/17. She has been evaluated by speech and swallow who now recommend pureed thin liquids . Her wound vac remains in placed and is changed every other day.  Her rectal tube remains in place with diarrhea    ROS: as above, otherwise negative     Allergies    sulfa drugs (Unknown)  sulfamethoxazole (Other)    Intolerances    ANTIBIOTICS/RELEVANT:  antimicrobials  colistimethate IVPB 100milliGRAM(s) IV Intermittent every 12 hours  meropenem IVPB 2000milliGRAM(s) IV Intermittent every 8 hours    immunologic:    MEDICATIONS  (STANDING):  pantoprazole  Injectable 40milliGRAM(s) IV Push daily  ALBUTerol/ipratropium for Nebulization 3milliLiter(s) Nebulizer every 6 hours  ascorbic acid Syrup 500milliGRAM(s) Oral daily  zinc sulfate 220milliGRAM(s) Oral daily  colistimethate IVPB 100milliGRAM(s) IV Intermittent every 12 hours  multivitamin 1Tablet(s) Oral daily  meropenem IVPB 2000milliGRAM(s) IV Intermittent every 8 hours  cyanocobalamin Injectable 1000MICROGram(s) IntraMuscular every 7 days  cholecalciferol 2000Unit(s) Oral daily  collagenase Ointment 1Application(s) Topical daily  enoxaparin Injectable 80milliGRAM(s) SubCutaneous <User Schedule>  thiamine 100milliGRAM(s) Oral daily  vancomycin    Solution 125milliGRAM(s) Oral every 6 hours  escitalopram Solution 20milliGRAM(s) Oral daily  hydrocortisone 1% Cream 1Application(s) Topical two times a day  losartan 100milliGRAM(s) Oral daily    MEDICATIONS  (PRN):  acetaminophen    Suspension 650milliGRAM(s) Enteral Tube every 6 hours PRN For Temp greater than 38 C (100.4 F)  ondansetron Injectable 4milliGRAM(s) IV Push every 6 hours PRN Nausea and/or Vomiting  diphenhydrAMINE   Injectable 25milliGRAM(s) IntraMuscular every 4 hours PRN Rash and/or Itching  acetaminophen   Tablet. 325milliGRAM(s) Oral every 4 hours PRN Moderate Pain (4 - 6)  oxyCODONE  5 mG/acetaminophen 325 mG 1Tablet(s) Oral every 4 hours PRN Severe Pain (7 - 10)      ICU Vital Signs Last 24 Hrs  T(C): 37.2, Max: 37.4 (01-20 @ 05:34)  T(F): 99, Max: 99.4 (01-20 @ 05:34)  HR: 101 (84 - 101)  BP: 154/95 (126/86 - 175/72)  BP(mean): 103 (103 - 128)  ABP: --  ABP(mean): --  RR: 18 (14 - 33)  SpO2: 92% (90% - 100%)        PHYSICAL EXAM:  General - awake, alert, in NAD obese  HEENT - MMM, PERRL, EOMI, no oral lesions noted   CV - S1, S2 RRR no murmurs   Resp - Decreased breath sounds b/l, rhonchi at the bases, +trach  Abdomen - soft, obese +BS, Minimal tenderness to palpation around wound vac site with mild erythema, +wound vac, +J tube, +rectal tube   Extremities- moving all extremities, wwp, +2 pulses distal b/l       LABS:                                     7.5    6.7   )-----------( 268      ( 19 Jan 2017 07:38 )             24.2     19 Jan 2017 07:38    137    |  100    |  14     ----------------------------<  84     4.7     |  29     |  0.33     Ca    9.8        19 Jan 2017 07:38  Phos  2.4       19 Jan 2017 07:38  Mg     1.4       19 Jan 2017 07:38          MICROBIOLOGY:  Bl Cx 12/16, 12/24: NGTD   Surgical Swab (01.09.17 @ 20:04) Few Pseudomonas aeruginosa (multi drug resistant) Pseudomonas aeruginosa (multi drug resistant)      Abd fluid 12/11: strep milleri, moderate yeast     Bronchial wash 12/13, 12/24: MDR pseudomonas  Bronchial wash 12/14: yeast    Sputum 12/27: Moderate Pseudomonas, MDR        Pleural fluid: yellow, , glucose 79, Total protein 4.4, Nuc cell count 570, RBC 4550, segmented granulocytes 22, lymphocytes 55, monocyte/macrophage 11, eosinophil 7, mesothelial cells 5    Pleural fluid Cx 12/28: No growth    Abd fluid CHECO 12/30: MDR pseudomonas           RADIOLOGY & ADDITIONAL STUDIES:  CT c/a/p 1/3: Complete collapse of the right lower lobe. Complete collapse of the left   lower lobe. Small bilateral pleural effusions. 1. Persistent localized peripherally enhancing fluid collections within the pelvis most consistent with intraperitoneal abscesses. These have not significantly changed in size   when compared to the prior  Study. 2. S/p gastrectomy and gastrojejunal anastomosis with endoluminal stent in place. There has been removal of the nasogastric tube. 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 4. Grossly stable small bore percutaneous surgical drain in the right mid abdomen terminating in the left midabdomen with interval removal of the larger bore percutaneous  drainage catheter previously noted in the right midabdomen.  5. Stable hepatosplenomegaly.  6. Stable appearance of the left kidney demonstrating dilatation of the left renal pelvis versus a  parapelvic cyst and small 1 cm left cortical cyst. 7. Slightly decreasing subcutaneous edema      CXR 1/1: mild improvement in congestion and/or infiltrates     CT c/a/p w/ IV and green dye contrast 12/27: s/p gastrectomy and gastrojejunal anastomosis with a stent in place. 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  A few rim-enhancing fluid collections in the lower abdomen and pelvis which might be infected.Moderate to large left pleural effusion, enlarged since prior study. Small right pleural effusion. Extensive atelectatic changes in the lungs,   left greater than right.    CT c/a/p 12/21: 1.  Interval removal of endoluminal stent from the distal esophagus   extending to the jejunal loop. Interval placement of an esophageal stent. 2.  Slight interval decrease in small to moderate ascites, particularly   in the left upper quadrant surrounding the anastomosis. The largest pocket measures 1.3 x 6.6 cm in the midline upper pelvis, similar to the   prior study.3.  Further interval decrease in free air, with a a few punctate foci   remaining.4.  Slight interval decrease in small right pleural effusion. No change   in small left pleural effusion. Associated atelectasis bilaterally,   unchanged.5.  New anterior abdominal wall defect at the surgical incision site, presumably from interval debridement. 6.  Mild cardiomegaly and trace pericardial effusion, unchanged.  7.  Diffuse anasarca.      CXR 12/25: Improvement of opacification left hemithorax in comparison to   prior examination of the chest 12/25/2016. Bilateral effusions.   Underlying infiltrates cannot be excluded    CT ABDOMEN/PELVIS 01/12: Multiple intra-abdominal abscesses, most of which have decreased in size. The abscess posterior to the second portion of the duodenum has increased in size slightly. Persistent bilateral lobar collapse. Bilateral pleural effusions.

## 2017-01-20 NOTE — PROGRESS NOTE ADULT - PROBLEM SELECTOR PLAN 1
Pt clinically improved, no longer requiring SICU care. She remains afebrile, with no leukocytosis.  -Continue Treating XDR pseudomonas with colistin (low dose) (12/31-) and synergistic effect with meropenem (which also has GNR +strep milleri coverage)   -As Colistin is nephrotoxic and patient getting studies with contrast, close attention must be paid to daily  renal clearance  -Continue with PO Vanco for C Diff (01/06-

## 2017-01-20 NOTE — PROGRESS NOTE ADULT - ASSESSMENT
56 year old critical patient s/p SIPs procedure with free air concerning for anastomotic leak s/p emergent ex lap with abdominal washout found to have leak of esophagojejunostomy which they could not repair due to its location.  She is s/p esophageal stent placement, peritoneal washout and internal double pigtail stent placement. 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 She has had repeated bronchoscopies with 12/13 and 12/24 bronchial wash growing MDR pseudomonas. She had a pigtail placed, roughly 600cc fluid removed and chest tube removed. She had a CT scan which showed persistent fluid collections (abdominal abscesses) and collapsed lung b/l. Pt is now S/P IR drainage of abdominal collection. Repeat CT of the abdomen shows Multiple intra-abdominal abscesses, most of which have decreased in size. The abscess posterior to the second portion of the duodenum has increased in size slightly. Patient remains afebrile. Her Stent has been removed by GI.

## 2017-01-20 NOTE — PROGRESS NOTE ADULT - SUBJECTIVE AND OBJECTIVE BOX
57 yo female pmh htn, obesity, gastric [id number] with revision 11/28 and complicated postop course, stepped down from icu to regional yesterday, no complaints today.  Pt states besides some nidhi rectal discomfort she feels no pain.  States she is able to take 3 steps when OOB but then feels overall fatigue.  States she feels improvement with each session of PT.   Denies fever, chills, chest pain, sob, abdominal pain, leg pain, lightheadedness, dizziness.                          7.5    6.7   )-----------( 268      ( 19 Jan 2017 07:38 )             24.2     19 Jan 2017 07:38    137    |  100    |  14     ----------------------------<  84     4.7     |  29     |  0.33     Ca    9.8        19 Jan 2017 07:38  Phos  2.4       19 Jan 2017 07:38  Mg     1.4       19 Jan 2017 07:38      T(C): 37.1, Max: 37.4 (01-20 @ 05:34)  HR: 96 (96 - 103)  BP: 158/87 (147/93 - 158/87)  RR: 17 (14 - 18)  SpO2: 92% (90% - 98%)  Wt(kg): --      gen: A&0x3, NAD  Heart: s1,s2 RRR,   Lung: decreased BS b/l lung bases, no crackles /rhonchi or wheeze b/l  Abd: decreased BS, n/d, n/t, soft, incisions c/d/i, no erythema surrounding central incision  LE: no edema, b/l LE, no gerard b/l LE  skin: pale, dry,     57 yo female s/p complicated post op course stepped down to regional yesterday, with cdiff and pneumonia MDR pseudomonas, asymptomatic at this time, vss, OOB, in chair, tolerating PO    Plan:  per dr tucker pt diet can be advanced to mechanical soft  oob to chair, aggressive PT, scds, lovenox  ppi  abx   nausea control prn  pain control prn  incentive spirometer   iv fluids  i/os daily  cbc daily

## 2017-01-20 NOTE — PROGRESS NOTE ADULT - ASSESSMENT
Drain output does not appear to be slowing down which may raise concern for a fistula. We would be open to performing a drain study as needed.    Dennis Snell MD  [phone]

## 2017-01-20 NOTE — PROGRESS NOTE ADULT - ASSESSMENT
56YOF s/p bypass revision following failed SIPS (anastamotic leak), found to have draining intra-abbdominal abscess, resolving acute respiratory failure, RUE DVT, pna and cdiff    Neuro: Percocet 2q4 PRN, tylenol q4 PRN,  Lexapro 20mg daily  CVS: MAP > 70, normotensive goals    Pulm: Trach collar, cuff deflated; downsized to 6. Duonebs. Will attempt to decannulate today  FEN/GI: J-tube TF 53cc/hr Osmolite 1.2. Protonix. Zofran PRN. Vit C, MVT.  Zinc sulfate.  Multivitamin.  Rectal tube.  : stg III sacral ulcer, voids/incont   Endo: ISS  ID: C. diff: Vancomycin PO (1/7) MDR Pseudomonas (sputum and peritoneal fluid) - colistin (12/31- ), meropenem (12/31- ), INH tobramycin (12/31-1/14) \\\ DC: linezolid (1/7-1/9) IV tobra (1/3, 1/4, 1/6), gent (12/26-) Imipenem (12/14--), Vanc (12/13-14), Zosyn (12/3-14), Linezolid (12/24-25),  fluconazole (12/4-25), - H. simplex Acyclovir (12/20-25), micafungin (12/31-1/10 ),   Heme: SCDs, lovenox 80 bid,   Lines/drains: L flank CHECO (1/6-), Feeding J-tube.  Wounds: Midline abdominal wound, change wound vac q48 hrs (last 1/16) 56YOF s/p bypass revision following failed SIPS (anastamotic leak), found to have draining intra-abbdominal abscess, resolving acute respiratory failure, RUE DVT, pna and cdiff    wound vac change today   OOB to chair    PT   pending MICHELE placement  F/u final abx plan w/ ID     Neuro: Percocet 2q4 PRN, tylenol q4 PRN,  Lexapro 20mg daily  CVS: MAP > 70, normotensive goals    Pulm: Trach collar, cuff deflated; downsized to 6. Duonebs. Will attempt to decannulate today  FEN/GI: J-tube TF 53cc/hr Osmolite 1.2. Protonix. Zofran PRN. Vit C, MVT.  Zinc sulfate.  Multivitamin.  Rectal tube.  : stg III sacral ulcer, voids/incont   Endo: ISS  ID: C. diff: Vancomycin PO (1/7) MDR Pseudomonas (sputum and peritoneal fluid) - colistin (12/31- ), meropenem (12/31- ), INH tobramycin (12/31-1/14) \\\ DC: linezolid (1/7-1/9) IV tobra (1/3, 1/4, 1/6), gent (12/26-) Imipenem (12/14--), Vanc (12/13-14), Zosyn (12/3-14), Linezolid (12/24-25),  fluconazole (12/4-25), - H. simplex Acyclovir (12/20-25), micafungin (12/31-1/10 ),   Heme: SCDs, lovenox 80 bid,   Lines/drains: L flank CHECO (1/6-), Feeding J-tube.  Wounds: Midline abdominal wound, change wound vac q48 hrs (last 1/16)

## 2017-01-20 NOTE — PROGRESS NOTE ADULT - SUBJECTIVE AND OBJECTIVE BOX
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 alert and stable. ?depressed. general condition improved. 3+-4/5.    Bed Mobility:mod. to maximal assistance.    Transfers:mod. assistance of 2 with rolling walker.    Ambulation: maximal assistance of 2 with rolling walker. poor endurance.      Impression:1. Deconditioned. 2. s/p respiratory distress. 3. s/p gastrectomy.      Recommendations: 1. Continue bed side PT as tolerated. 2. Subacute rehab. placement.

## 2017-01-20 NOTE — PROGRESS NOTE ADULT - SUBJECTIVE AND OBJECTIVE BOX
56F s/p CT-guided drainage of an abdominal abscess. Pt has no complaints at the moment. She remains afebrile over night. Drain is draining seropurulent drainage and output was 45cc in the past 24h. Drain was flushed with 3cc of NS without resistance.

## 2017-01-20 NOTE — PROGRESS NOTE ADULT - SUBJECTIVE AND OBJECTIVE BOX
O/N: BRAYDEN  1/19: decannulated, transferred to regional , speech and swallow recs pureed thin liquids , wound vac change MWF, Stent removed by GI  1/18/17, rectal tube in place , J feeds with osmolite O/N: BRAYDEN  1/19: decannulated, transferred to [facility] , speech and swallow recs pureed thin liquids , wound vac change MWF, Stent removed by GI  1/18/17, rectal tube in place , J feeds with osmolite         STATUS POST:    11/28: Harmony-en-Y reconstruction  12/3: ex lap, abdominal washout, placement of a feeding j tube in common channel distal to Russel anastomosis, and placement of abdominal drains.    SUBJECTIVE:  Pt seen and examined on AM rounds with bianca chief resident. Pt resting comfortably. Pt with no complaints at this time. No acute events per nursing staff.     MEDICATIONS  (STANDING):  pantoprazole  Injectable 40milliGRAM(s) IV Push daily  ALBUTerol/ipratropium for Nebulization 3milliLiter(s) Nebulizer every 6 hours  ascorbic acid Syrup 500milliGRAM(s) Oral daily  zinc sulfate 220milliGRAM(s) Oral daily  colistimethate IVPB 100milliGRAM(s) IV Intermittent every 12 hours  multivitamin 1Tablet(s) Oral daily  meropenem IVPB 2000milliGRAM(s) IV Intermittent every 8 hours  cyanocobalamin Injectable 1000MICROGram(s) IntraMuscular every 7 days  cholecalciferol 2000Unit(s) Oral daily  collagenase Ointment 1Application(s) Topical daily  enoxaparin Injectable 80milliGRAM(s) SubCutaneous <User Schedule>  thiamine 100milliGRAM(s) Oral daily  vancomycin    Solution 125milliGRAM(s) Oral every 6 hours  escitalopram Solution 20milliGRAM(s) Oral daily  hydrocortisone 1% Cream 1Application(s) Topical two times a day  losartan 100milliGRAM(s) Oral daily    MEDICATIONS  (PRN):  acetaminophen    Suspension 650milliGRAM(s) Enteral Tube every 6 hours PRN For Temp greater than 38 C (100.4 F)  ondansetron Injectable 4milliGRAM(s) IV Push every 6 hours PRN Nausea and/or Vomiting  diphenhydrAMINE   Injectable 25milliGRAM(s) IntraMuscular every 4 hours PRN Rash and/or Itching  acetaminophen   Tablet. 325milliGRAM(s) Oral every 4 hours PRN Moderate Pain (4 - 6)  oxyCODONE  5 mG/acetaminophen 325 mG 1Tablet(s) Oral every 4 hours PRN Severe Pain (7 - 10)      Vital Signs Last 24 Hrs  T(C): 37.4, Max: 37.4 (01-20 @ 05:34)  T(F): 99.4, Max: 99.4 (01-20 @ 05:34)  HR: 101 (84 - 101)  BP: 147/93 (126/86 - 188/90)  BP(mean): 103 (103 - 130)  RR: 18 (14 - 33)  SpO2: 91% (90% - 100%)    PHYSICAL EXAM:      Constitutional: A&Ox3    Respiratory: non labored breathing, no respiratory distress    Gastrointestinal: Soft, ND, NT                 Incision: Open, Wound Vac in place, functioning properly   - J tube in place    Genitourinary: voids/incontinent    Extremities: (-) edema                  I&O's Detail      LABS:                        7.5    6.7   )-----------( 268      ( 19 Jan 2017 07:38 )             24.2     19 Jan 2017 07:38    137    |  100    |  14     ----------------------------<  84     4.7     |  29     |  0.33     Ca    9.8        19 Jan 2017 07:38  Phos  2.4       19 Jan 2017 07:38  Mg     1.4       19 Jan 2017 07:38            RADIOLOGY & ADDITIONAL STUDIES:  N/A

## 2017-01-21 NOTE — PROGRESS NOTE ADULT - PROBLEM SELECTOR PLAN 1
Problem: Pseudomonas sepsis.  Plan: Pt clinically improved, no longer requiring SICU care. She remains afebrile, with no leukocytosis.  -Continue Treating XDR pseudomonas with colistin (low dose) (12/31-) and synergistic effect with meropenem (which also has GNR +strep milleri coverage)   -As Colistin is nephrotoxic and patient getting studies with contrast, close attention must be paid to daily  renal clearance  -Continue with PO Vanco for C Diff for next 3 weeks until she completes IV antibiotics.

## 2017-01-21 NOTE — PROGRESS NOTE ADULT - SUBJECTIVE AND OBJECTIVE BOX
INTERVAL HPI/OVERNIGHT EVENTS: Stable on current antibiotics, still diarrhea    CONSTITUTIONAL:  Negative fever or chills, feels well, good appetite  EYES:  Negative  blurry vision or double vision  CARDIOVASCULAR:  Negative for chest pain or palpitations  RESPIRATORY:  Negative for cough, wheezing, or SOB   GASTROINTESTINAL:  Negative for nausea, vomiting, diarrhea, constipation, or abdominal pain  GENITOURINARY:  Negative frequency, urgency or dysuria  NEUROLOGIC:  No headache, confusion, dizziness, lightheadedness      ANTIBIOTICS/RELEVANT:    colistimethate IVPB 100milliGRAM(s) IV Intermittent every 12 hours  meropenem IVPB 2000milliGRAM(s) IV Intermittent every 8 hours  vancomycin    Solution 125milliGRAM(s) Oral every 6 hours      Vital Signs Last 24 Hrs  T(C): 36.3, Max: 37.6 (01-20 @ 20:34)  T(F): 97.3, Max: 99.7 (01-20 @ 20:34)  HR: 100 (92 - 106)  BP: 149/87 (145/93 - 160/94)  BP(mean): --  RR: 16 (16 - 18)  SpO2: 96% (94% - 96%)    PHYSICAL EXAM:  Constitutional:Well-developed, well nourished  Eyes:CL, EOMI  Ear/Nose/Throat: no oral lesion, no sinus tenderness on percussion	  Neck:no JVD, no lymphadenopathy, supple  Respiratory: CTA eileen  Cardiovascular: S1S2 RRR, no murmurs  Gastrointestinal:  Extremities:no e/e/c  Vascular: DP Pulse:	right normal; left normal      LABS:                        7.4    7.4   )-----------( 297      ( 21 Jan 2017 07:50 )             24.4     21 Jan 2017 07:50    138    |  98     |  10     ----------------------------<  88     3.5     |  32     |  0.41     Ca    10.1       21 Jan 2017 07:50  Phos  1.9       21 Jan 2017 07:50  Mg     1.5       21 Jan 2017 07:50            MICROBIOLOGY:    RADIOLOGY & ADDITIONAL STUDIES: INTERVAL HPI/OVERNIGHT EVENTS: Stable on current antibiotics, still diarrhea    CONSTITUTIONAL:  Negative fever or chills, feels well, good appetite  EYES:  Negative  blurry vision or double vision  CARDIOVASCULAR:  Negative for chest pain or palpitations  RESPIRATORY:  Negative for cough, wheezing, or SOB   GASTROINTESTINAL:  Negative for nausea, vomiting, diarrhea, constipation, or abdominal pain  GENITOURINARY:  Negative frequency, urgency or dysuria  NEUROLOGIC:  No headache, confusion, dizziness, lightheadedness      ANTIBIOTICS/RELEVANT:    colistimethate IVPB 100milliGRAM(s) IV Intermittent every 12 hours  meropenem IVPB 2000milliGRAM(s) IV Intermittent every 8 hours  vancomycin    Solution 125milliGRAM(s) Oral every 6 hours      Vital Signs Last 24 Hrs  T(C): 36.3, Max: 37.6 (01-20 @ 20:34)  T(F): 97.3, Max: 99.7 (01-20 @ 20:34)  HR: 100 (92 - 106)  BP: 149/87 (145/93 - 160/94)  BP(mean): --  RR: 16 (16 - 18)  SpO2: 96% (94% - 96%)    PHYSICAL EXAM:  Constitutional: awake, alert, in NAD obese  Eyes:CL, EOMI  Ear/Nose/Throat: no oral lesion, no sinus tenderness on percussion	  Neck:no JVD, no lymphadenopathy, supple  Respiratory: CTA eileen  Cardiovascular: S1S2 RRR, no murmurs  Gastrointestinal: soft, obese +BS, Minimal tenderness to palpation around wound vac site with mild erythema,   +wound vac, +J tube, +rectal tube   Extremities:no e/e/c  Vascular: DP Pulse:right normal; left normal      LABS:                        7.4    7.4   )-----------( 297      ( 21 Jan 2017 07:50 )             24.4     21 Jan 2017 07:50    138    |  98     |  10     ----------------------------<  88     3.5     |  32     |  0.41     Ca    10.1       21 Jan 2017 07:50  Phos  1.9       21 Jan 2017 07:50  Mg     1.5       21 Jan 2017 07:50      MICROBIOLOGY:  Culture - Surgical Swab (01.09.17 @ 20:04)    -  Amikacin: S <=16    -  Cefepime: R >16    -  Meropenem: I 8    -  Piperacillin/Tazobactam: R    -  Tobramycin: S    Gram Stain:   No organisms seen  Moderate WBC's  Few epithelial cells    -  Aztreonam: R    -  Imipenem: R    -  Aztreonam: R >16    -  Ceftazidime: R    -  Ceftazidime: R >16    -  Gentamicin: S    -  Imipenem: R >8    -  Levofloxacin: I 4    -  Ciprofloxacin: I 2    -  Gentamicin: S <=4    -  Piperacillin/Tazobactam: R >64    -  Tobramycin: S <=4    Specimen Source: .Surgical Swab Wound - Upper    Culture Results:   Few Pseudomonas aeruginosa (multi drug resistant)  Result called to and read back by_ N.Sicard RN  01/12/2017 09:01:22    Organism Identification: Pseudomonas aeruginosa (multi drug resistant)  Pseudomonas aeruginosa (multi drug resistant)    Organism: Pseudomonas aeruginosa (multi drug resistant)    Organism: Pseudomonas aeruginosa (multi drug resistant)    Method Type: ALISHA    Method Type: KB INTERVAL HPI/OVERNIGHT EVENTS: Stable on current antibiotics, still diarrhea    CONSTITUTIONAL:  Negative fever or chills, feels well, good appetite  EYES:  Negative  blurry vision or double vision  CARDIOVASCULAR:  Negative for chest pain or palpitations  RESPIRATORY:  Negative for cough, wheezing, or SOB   GASTROINTESTINAL:  Negative for nausea, vomiting, diarrhea, constipation, or abdominal pain  GENITOURINARY:  Negative frequency, urgency or dysuria  NEUROLOGIC:  No headache, confusion, dizziness, lightheadedness      ANTIBIOTICS/RELEVANT:    colistimethate IVPB 100milliGRAM(s) IV Intermittent every 12 hours  meropenem IVPB 2000milliGRAM(s) IV Intermittent every 8 hours  vancomycin    Solution 125milliGRAM(s) Oral every 6 hours      Vital Signs Last 24 Hrs  T(C): 36.3, Max: 37.6 (01-20 @ 20:34)  T(F): 97.3, Max: 99.7 (01-20 @ 20:34)  HR: 100 (92 - 106)  BP: 149/87 (145/93 - 160/94)  BP(mean): --  RR: 16 (16 - 18)  SpO2: 96% (94% - 96%)    PHYSICAL EXAM:  Constitutional: awake, alert, in NAD obese  Eyes:CL, EOMI  Ear/Nose/Throat: no oral lesion, no sinus tenderness on percussion	  Neck:no JVD, no lymphadenopathy, trach exit dressing in place  Respiratory: CTA eileen  Cardiovascular: S1S2 RRR, no murmurs  Gastrointestinal: soft, obese +BS, Minimal tenderness to palpation around wound vac site with mild erythema,   +wound vac, +J tube,   Extremities:no e/e/c  Vascular: DP Pulse:right normal; left normal      LABS:                        7.4    7.4   )-----------( 297      ( 21 Jan 2017 07:50 )             24.4     21 Jan 2017 07:50    138    |  98     |  10     ----------------------------<  88     3.5     |  32     |  0.41     Ca    10.1       21 Jan 2017 07:50  Phos  1.9       21 Jan 2017 07:50  Mg     1.5       21 Jan 2017 07:50      MICROBIOLOGY:  Culture - Surgical Swab (01.09.17 @ 20:04)    -  Amikacin: S <=16    -  Cefepime: R >16    -  Meropenem: I 8    -  Piperacillin/Tazobactam: R    -  Tobramycin: S    Gram Stain:   No organisms seen  Moderate WBC's  Few epithelial cells    -  Aztreonam: R    -  Imipenem: R    -  Aztreonam: R >16    -  Ceftazidime: R    -  Ceftazidime: R >16    -  Gentamicin: S    -  Imipenem: R >8    -  Levofloxacin: I 4    -  Ciprofloxacin: I 2    -  Gentamicin: S <=4    -  Piperacillin/Tazobactam: R >64    -  Tobramycin: S <=4    Specimen Source: .Surgical Swab Wound - Upper    Culture Results:   Few Pseudomonas aeruginosa (multi drug resistant)  Result called to and read back by_ N.Sicard RN  01/12/2017 09:01:22    Organism Identification: Pseudomonas aeruginosa (multi drug resistant)  Pseudomonas aeruginosa (multi drug resistant)    Organism: Pseudomonas aeruginosa (multi drug resistant)    Organism: Pseudomonas aeruginosa (multi drug resistant)    Method Type: ALISHA    Method Type: KB

## 2017-01-21 NOTE — PROGRESS NOTE ADULT - ASSESSMENT
56 year old  patient s/p SIPs procedure with anastomotic leak s/p emergent ex lap with abdominal washout found to have leak of esophagojejunostomy which they could not repair due to its location.  She is s/p esophageal stent placement, peritoneal washout and internal double pigtail stent placement, CT (+) abdominal abscesses, enterocutaneous fistula with previous culture abdominal fluid 12/11 with strep milleri and moderate yeast and MDR Ps aeruginosa

## 2017-01-21 NOTE — PROGRESS NOTE ADULT - SUBJECTIVE AND OBJECTIVE BOX
OVERNIGHT EVENTS: No acute events overnight         SUBJECTIVE: Patient denies any complaints   Flatus: [] YES [X] NO             Bowel Movement: [ ] YES [X ] NO  Pain (0-10):            Pain Control Adequate: [X ] YES [ ] NO  Nausea: [ ] YES [X ] NO            Vomiting: [ ] YES [X ] NO  Diarrhea: [ ] YES [X ] NO         Constipation: [ ] YES [X ] NO     Chest Pain: [ ] YES [X ] NO    SOB:  [ ] YES [X ] NO    colistimethate IVPB 100milliGRAM(s) IV Intermittent every 12 hours  meropenem IVPB 2000milliGRAM(s) IV Intermittent every 8 hours  enoxaparin Injectable 80milliGRAM(s) SubCutaneous <User Schedule>  vancomycin    Solution 125milliGRAM(s) Oral every 6 hours  losartan 100milliGRAM(s) Oral daily      Vital Signs Last 24 Hrs  T(C): 36.3, Max: 37.6 (01-20 @ 20:34)  T(F): 97.3, Max: 99.7 (01-20 @ 20:34)  HR: 100 (92 - 106)  BP: 149/87 (145/93 - 160/94)  BP(mean): --  RR: 16 (16 - 18)  SpO2: 96% (92% - 98%)  I&O's Detail      gen: A&0x3, NAD  Heart: s1,s2 RRR,   Lung: decreased BS b/l lung bases, no crackles /rhonchi or wheeze b/l  Abd: decreased BS, n/d, n/t, soft, incisions c/d/i, no erythema surrounding central incision  LE: no edema, b/l LE, no gerard b/l LE  skin: pale, dry,       LABS:                        7.4    7.4   )-----------( 297      ( 21 Jan 2017 07:50 )             24.4     21 Jan 2017 07:50    138    |  98     |  10     ----------------------------<  88     3.5     |  32     |  0.41     Ca    10.1       21 Jan 2017 07:50  Phos  1.9       21 Jan 2017 07:50  Mg     1.5       21 Jan 2017 07:50            RADIOLOGY & ADDITIONAL STUDIES:

## 2017-01-21 NOTE — PROGRESS NOTE ADULT - ASSESSMENT
55 yo female pmh htn, obesity, gastric [id number] with revision 11/28 and complicated postop course, stepped down from icu to regional 1/19/17.   mechanical soft  oob to chair, aggressive PT, scds, lovenox  ppi  abx

## 2017-01-22 NOTE — PROGRESS NOTE ADULT - ASSESSMENT
56YOF s/p bypass revision following failed SIPS (anastamotic leak), found to have draining intra-abdominal abscess, resolving acute respiratory failure, RUE DVT, pna and cdiff    Neuro: Percocet 2q4 PRN, tylenol q4 PRN,  Lexapro 20mg daily  CVS: MAP > 70, normotensive goals, losartan 100    Pulm: Trach collar, cuff deflated; downsized to 6. Duonebs.   FEN/GI: Mechanical soft diet. J-tube TF 53cc/hr Osmolite 1.2. Protonix. Zofran PRN. Vit C, MVT.  Zinc sulfate.  Multivitamin.  Rectal tube.  : stg III sacral ulcer, voids/incont   Endo: ISS  ID: C. diff: Vancomycin PO (1/7) MDR Pseudomonas (sputum and peritoneal fluid) - colistin (12/31- ), meropenem (12/31- ), INH tobramycin (12/31-1/14) \\\ DC: linezolid (1/7-1/9) IV tobra (1/3, 1/4, 1/6), gent (12/26-) Imipenem (12/14--), Vanc (12/13-14), Zosyn (12/3-14), Linezolid (12/24-25),  fluconazole (12/4-25), - H. simplex Acyclovir (12/20-25), micafungin (12/31-1/10 ),   Heme: SCDs, lovenox 80 bid,   Lines/drains: L flank CHECO (1/6-), Feeding J-tube.  Wounds: Midline abdominal wound, change wound vac MWF   Benadryl PRN for itching.

## 2017-01-22 NOTE — PROGRESS NOTE ADULT - PROBLEM SELECTOR PLAN 1
1)Continue Treating XDR pseudomonas with colistin (low dose) (12/31-) and synergistic effect with meropenem (which also has GNR +strep milleri coverage)   -As Colistin is nephrotoxic and patient getting studies with contrast, close attention must be paid to daily  renal clearance  2)Continue with PO Vanco for C Diff for next 3 weeks until she completes IV antibiotics.

## 2017-01-22 NOTE — PROGRESS NOTE ADULT - SUBJECTIVE AND OBJECTIVE BOX
DAILY PROGRESS NOTE:     INTERVAL HPI / OVERNIGHT EVENTS:  O/N: Tolerating mech soft diet w/ J tube feeds (osmolite). OOBTC y'day. No AM labs.   1/21: BRAYDEN    STATUS POST:    11/28: Harmony-en-Y reconstruction  12/3: ex lap, abdominal washout, placement of a feeding j tube in common channel distal to Russel anastomosis, and placement of abdominal drains.  Abdominal wound left open with wound vac in place.  Intra-op, patient found to have leak of esophagojejunostomy which they could not repair due to its location.      [ INCOMPLETE, PENDING AM ROUNDS ]

## 2017-01-22 NOTE — PROVIDER CONTACT NOTE (OTHER) - ASSESSMENT
/106 , HR 98
Since admission patient hemoglobin dropped from 9.9 to 7.7
tan liquid consistent with tube feedings are draining from bilateral JPs, odor is similar to vomited tube feedings
Pt also with temperature of 100.3, down from 101.3 earlier this afternoon. MD Uriel to bedside to see pt, incentive spirometry encouraged, no other orders at this time. Will continue to monitor.
warm to touch

## 2017-01-22 NOTE — PROGRESS NOTE ADULT - ASSESSMENT
Drainage output is decreasing but will recommend monitoring output until drainage is below 10cc for 24h for 2 consecutive days. Will continue to follow.    Dennis Snell MD  [phone]

## 2017-01-22 NOTE — PROGRESS NOTE ADULT - SUBJECTIVE AND OBJECTIVE BOX
INTERVAL HPI/OVERNIGHT EVENTS: c/o buttock pain    CONSTITUTIONAL:  Negative fever or chills, feels well, good appetite  EYES:  Negative  blurry vision or double vision  CARDIOVASCULAR:  Negative for chest pain or palpitations  RESPIRATORY:  Negative for cough, wheezing, or SOB   GASTROINTESTINAL:  Negative for nausea, vomiting, (+)diarrhea,   GENITOURINARY:  Negative frequency, urgency or dysuria  NEUROLOGIC:  No headache, confusion, dizziness, lightheadedness      ANTIBIOTICS/RELEVANT:  colistimethate IVPB 100milliGRAM(s) IV Intermittent every 12 hours  meropenem IVPB 2000milliGRAM(s) IV Intermittent every 8 hours  vancomycin    Solution 125milliGRAM(s) Oral every 6 hours        Vital Signs Last 24 Hrs  T(C): 36.8, Max: 37.3 (01-21 @ 21:00)  T(F): 98.2, Max: 99.2 (01-21 @ 21:00)  HR: 92 (92 - 100)  BP: 160/88 (158/92 - 171/106)  RR: 17 (16 - 18)  SpO2: 96% (94% - 96%)    PHYSICAL EXAM:  Constitutional: Well-developed, well nourished  Eyes:CL, EOMI  Ear/Nose/Throat: no oral lesion, no sinus tenderness on percussion	  Neck:Trach exit site with dressing, no JVD, no lymphadenopathy, supple  Respiratory: CTA eileen  Cardiovascular: S1S2 RRR, no murmurs  Gastrointestinal:soft, (+) BS,midline Vac in place R CHECO in place  Extremities:no e/e/c  Vascular: DP Pulse:	right normal; left normal      LABS:                        7.4    7.4   )-----------( 297      ( 21 Jan 2017 07:50 )             24.4     21 Jan 2017 07:50    138    |  98     |  10     ----------------------------<  88     3.5     |  32     |  0.41     Ca    10.1       21 Jan 2017 07:50  Phos  1.9       21 Jan 2017 07:50  Mg     1.5       21 Jan 2017 07:50          MICROBIOLOGY:  Culture - Surgical Swab (01.09.17 @ 20:04)    -  Amikacin: S <=16    -  Cefepime: R >16    -  Meropenem: I 8    -  Piperacillin/Tazobactam: R    -  Tobramycin: S    Gram Stain:   No organisms seen  Moderate WBC's  Few epithelial cells    -  Aztreonam: R    -  Imipenem: R    -  Aztreonam: R >16    -  Ceftazidime: R    -  Ceftazidime: R >16    -  Gentamicin: S    -  Imipenem: R >8    -  Levofloxacin: I 4    -  Ciprofloxacin: I 2    -  Gentamicin: S <=4    -  Piperacillin/Tazobactam: R >64    -  Tobramycin: S <=4    Specimen Source: .Surgical Swab Wound - Upper    Culture Results:   Few Pseudomonas aeruginosa (multi drug resistant)  Result called to and read back by_ N.Sicard RN  01/12/2017 09:01:22    Organism Identification: Pseudomonas aeruginosa (multi drug resistant)  Pseudomonas aeruginosa (multi drug resistant)    Organism: Pseudomonas aeruginosa (multi drug resistant)    Organism: Pseudomonas aeruginosa (multi drug resistant)    Method Type: ALISHA    Method Type: KB        RADIOLOGY & ADDITIONAL STUDIES:

## 2017-01-22 NOTE — PROGRESS NOTE ADULT - SUBJECTIVE AND OBJECTIVE BOX
Vital Signs Last 24 Hrs  T(C): 36.6, Max: 37.3 (01-21 @ 21:00)  T(F): 97.9, Max: 99.2 (01-21 @ 21:00)  HR: 95 (93 - 100)  BP: 159/89 (149/87 - 171/106)  BP(mean): --  RR: 16 (16 - 18)  SpO2: 95% (94% - 96%)      SUBJECTIVE: Pt seen and examined at bedside. Pt has no complaints at this time.  SOB:  [ ] YES [ x] NO  Dyspnea: [ ]YES [x ]NO  Chest Discomfort: [ ] YES [x ] NO    Nausea: [ ] YES [x ] NO           Vomiting: [ ] YES [x ] NO  Flatus: [ ] YES [x ] NO             Bowel Movement: [ ] YES [ x] NO  Diarrhea: [ ] YES [x ] NO         Void: [x ]YES [ ]No  Constipation: [ ] YES [ x] NO     Pain (0-10):              Pain Control Adequate: [x ] YES [ ] NO  Yu: na  J tube: inplace and intact    PE:  Dressings c/d/i, wound vac intact, no clinical signs of infection    I&O's Summary    I&O's Detail      MEDICATIONS  (STANDING):  pantoprazole  Injectable 40milliGRAM(s) IV Push daily  ALBUTerol/ipratropium for Nebulization 3milliLiter(s) Nebulizer every 6 hours  ascorbic acid Syrup 500milliGRAM(s) Oral daily  zinc sulfate 220milliGRAM(s) Oral daily  colistimethate IVPB 100milliGRAM(s) IV Intermittent every 12 hours  multivitamin 1Tablet(s) Oral daily  meropenem IVPB 2000milliGRAM(s) IV Intermittent every 8 hours  cyanocobalamin Injectable 1000MICROGram(s) IntraMuscular every 7 days  cholecalciferol 2000Unit(s) Oral daily  collagenase Ointment 1Application(s) Topical daily  enoxaparin Injectable 80milliGRAM(s) SubCutaneous <User Schedule>  thiamine 100milliGRAM(s) Oral daily  vancomycin    Solution 125milliGRAM(s) Oral every 6 hours  escitalopram Solution 20milliGRAM(s) Oral daily  hydrocortisone 1% Cream 1Application(s) Topical two times a day  losartan 100milliGRAM(s) Oral daily  diazepam    Tablet 5milliGRAM(s) Oral at bedtime    MEDICATIONS  (PRN):  acetaminophen    Suspension 650milliGRAM(s) Enteral Tube every 6 hours PRN For Temp greater than 38 C (100.4 F)  ondansetron Injectable 4milliGRAM(s) IV Push every 6 hours PRN Nausea and/or Vomiting  diphenhydrAMINE   Injectable 25milliGRAM(s) IntraMuscular every 4 hours PRN Rash and/or Itching  acetaminophen   Tablet. 325milliGRAM(s) Oral every 4 hours PRN Moderate Pain (4 - 6)  oxyCODONE  5 mG/acetaminophen 325 mG 1Tablet(s) Oral every 4 hours PRN Severe Pain (7 - 10)      LABS:                        7.4    7.4   )-----------( 297      ( 21 Jan 2017 07:50 )             24.4     21 Jan 2017 07:50    138    |  98     |  10     ----------------------------<  88     3.5     |  32     |  0.41     Ca    10.1       21 Jan 2017 07:50  Phos  1.9       21 Jan 2017 07:50  Mg     1.5       21 Jan 2017 07:50            RADIOLOGY & ADDITIONAL STUDIES:

## 2017-01-22 NOTE — PROGRESS NOTE ADULT - SUBJECTIVE AND OBJECTIVE BOX
56F s/p CT-guided drainage of an abdominal abscess. Pt has no complaints at the moment. She remains afebrile. Drain is draining cloudy serous drainage and output was 15cc for the past two days. Drain was flushed with 3cc of NS without resistance.

## 2017-01-23 NOTE — PROGRESS NOTE ADULT - SUBJECTIVE AND OBJECTIVE BOX
O/N: BRAYDEN  1/22: BRAYDEN O/N: BRAYDEN  1/22: BRAYDEN     STATUS POST:    11/28: Harmony-en-Y reconstruction  12/3: ex lap, abdominal washout, placement of a feeding j tube in common channel distal to Russel anastomosis, and placement of abdominal drains    SUBJECTIVE:  Patient seen and examined with surgery chief resident on AM rounds. Patient with no complaints at this time. Tolerating minimal PO intake.     MEDICATIONS  (STANDING):  pantoprazole  Injectable 40milliGRAM(s) IV Push daily  ALBUTerol/ipratropium for Nebulization 3milliLiter(s) Nebulizer every 6 hours  ascorbic acid Syrup 500milliGRAM(s) Oral daily  zinc sulfate 220milliGRAM(s) Oral daily  colistimethate IVPB 100milliGRAM(s) IV Intermittent every 12 hours  multivitamin 1Tablet(s) Oral daily  meropenem IVPB 2000milliGRAM(s) IV Intermittent every 8 hours  cyanocobalamin Injectable 1000MICROGram(s) IntraMuscular every 7 days  cholecalciferol 2000Unit(s) Oral daily  collagenase Ointment 1Application(s) Topical daily  enoxaparin Injectable 80milliGRAM(s) SubCutaneous <User Schedule>  thiamine 100milliGRAM(s) Oral daily  vancomycin    Solution 125milliGRAM(s) Oral every 6 hours  escitalopram Solution 20milliGRAM(s) Oral daily  hydrocortisone 1% Cream 1Application(s) Topical two times a day  losartan 100milliGRAM(s) Oral daily  diazepam    Tablet 5milliGRAM(s) Oral at bedtime  nystatin Powder 1Application(s) Topical daily    MEDICATIONS  (PRN):  acetaminophen    Suspension 650milliGRAM(s) Enteral Tube every 6 hours PRN For Temp greater than 38 C (100.4 F)  ondansetron Injectable 4milliGRAM(s) IV Push every 6 hours PRN Nausea and/or Vomiting  acetaminophen   Tablet. 325milliGRAM(s) Oral every 4 hours PRN Moderate Pain (4 - 6)  oxyCODONE  5 mG/acetaminophen 325 mG 1Tablet(s) Oral every 4 hours PRN Severe Pain (7 - 10)  diphenhydrAMINE   Injectable 25milliGRAM(s) IV Push every 6 hours PRN Itching      Vital Signs Last 24 Hrs  T(C): 37.9, Max: 37.9 (01-23 @ 05:31)  T(F): 100.2, Max: 100.2 (01-23 @ 05:31)  HR: 102 (92 - 102)  BP: 169/108 (152/100 - 169/108)  BP(mean): --  RR: 18 (16 - 18)  SpO2: 95% (95% - 96%)    PHYSICAL EXAM:      Constitutional: A&Ox3    Respiratory: non labored breathing, no respiratory distress    Gastrointestinal: Soft, ND, NT                 Incision: open wound with wound vac in place.     Extremities: (-) edema                  I&O's Detail      LABS:                RADIOLOGY & ADDITIONAL STUDIES:

## 2017-01-23 NOTE — PROGRESS NOTE ADULT - PROBLEM SELECTOR PLAN 1
Pt clinically improved. She remains afebrile, with no leukocytosis.  -Continue Treating XDR pseudomonas with colistin (low dose) (12/31-) and synergistic effect with meropenem (which also has GNR +strep milleri coverage)   -Pt will need a total of 4-6 weeks therapy. Her Abx regimen was started on 12/31. If patient is up for discharge today, please continue Abx for at least 1 week.  -As Colistin is nephrotoxic and patient getting studies with contrast, close attention must be paid to daily  renal clearance  -Continue with PO Vanco for C Diff (01/06-) until Abx are stopped.

## 2017-01-23 NOTE — PROGRESS NOTE ADULT - SUBJECTIVE AND OBJECTIVE BOX
56F s/p CT-guided drainage of an abdominal abscess. Pt has no complaints at the moment. She remains afebrile. Drain is draining cloudy serous drainage and output was 17.5 fin the past 24h. Drain was flushed with 3cc of NS without resistance.

## 2017-01-23 NOTE — PROGRESS NOTE ADULT - ASSESSMENT
Drain output still greater than 10cc per 24h. Recommend keeping drain in place for now. Will continue to follow.    Dennis Snell MD  [phone]

## 2017-01-23 NOTE — PROGRESS NOTE ADULT - PROBLEM/PLAN-1
DISPLAY PLAN FREE TEXT

## 2017-01-23 NOTE — PROGRESS NOTE ADULT - PROBLEM SELECTOR PROBLEM 1
Anastomotic leak of intestine
Anastomotic leak of intestine
ASA (acute kidney injury)
Anastomotic leak of intestine
Anastomotic leak of intestine
Obesity
Pseudomonas sepsis
ASA (acute kidney injury)
Anastomotic leak of intestine
Obesity
Pseudomonas sepsis
Pneumonia, bacterial
Pneumonia, bacterial
ASA (acute kidney injury)
ASA (acute kidney injury)
Pneumonia, bacterial
Pneumonia, bacterial
ASA (acute kidney injury)
Pleural effusion
Pneumonia, bacterial

## 2017-01-23 NOTE — PROGRESS NOTE ADULT - SUBJECTIVE AND OBJECTIVE BOX
INTERVAL HPI/OVERNIGHT EVENTS: Pt remains afebrile, continues to have mild diarrhea. Feels well otherwise  ROS: as above, otherwise negative     Allergies    sulfa drugs (Unknown)  sulfamethoxazole (Other)    Intolerances    ANTIBIOTICS/RELEVANT:  antimicrobials  colistimethate IVPB 100milliGRAM(s) IV Intermittent every 12 hours  meropenem IVPB 2000milliGRAM(s) IV Intermittent every 8 hours    immunologic:    MEDICATIONS  (STANDING):  pantoprazole  Injectable 40milliGRAM(s) IV Push daily  ALBUTerol/ipratropium for Nebulization 3milliLiter(s) Nebulizer every 6 hours  ascorbic acid Syrup 500milliGRAM(s) Oral daily  zinc sulfate 220milliGRAM(s) Oral daily  colistimethate IVPB 100milliGRAM(s) IV Intermittent every 12 hours  multivitamin 1Tablet(s) Oral daily  meropenem IVPB 2000milliGRAM(s) IV Intermittent every 8 hours  cyanocobalamin Injectable 1000MICROGram(s) IntraMuscular every 7 days  cholecalciferol 2000Unit(s) Oral daily  collagenase Ointment 1Application(s) Topical daily  enoxaparin Injectable 80milliGRAM(s) SubCutaneous <User Schedule>  thiamine 100milliGRAM(s) Oral daily  vancomycin    Solution 125milliGRAM(s) Oral every 6 hours  escitalopram Solution 20milliGRAM(s) Oral daily  hydrocortisone 1% Cream 1Application(s) Topical two times a day  losartan 100milliGRAM(s) Oral daily  diazepam    Tablet 5milliGRAM(s) Oral at bedtime  nystatin Powder 1Application(s) Topical daily  magnesium sulfate  IVPB 2Gram(s) IV Intermittent every 1 hour  potassium chloride   Powder 40milliEquivalent(s) Enteral Tube once  potassium chloride  10 mEq/100 mL IVPB 10milliEquivalent(s) IV Intermittent every 1 hour    MEDICATIONS  (PRN):  acetaminophen    Suspension 650milliGRAM(s) Enteral Tube every 6 hours PRN For Temp greater than 38 C (100.4 F)  ondansetron Injectable 4milliGRAM(s) IV Push every 6 hours PRN Nausea and/or Vomiting  acetaminophen   Tablet. 325milliGRAM(s) Oral every 4 hours PRN Moderate Pain (4 - 6)  oxyCODONE  5 mG/acetaminophen 325 mG 1Tablet(s) Oral every 4 hours PRN Severe Pain (7 - 10)  diphenhydrAMINE   Injectable 25milliGRAM(s) IV Push every 6 hours PRN Itching      Vital Signs Last 24 Hrs  T(C): 37.9, Max: 37.9 (01-23 @ 05:31)  T(F): 100.2, Max: 100.2 (01-23 @ 05:31)  HR: 102 (92 - 102)  BP: 169/108 (152/100 - 169/108)  BP(mean): --  RR: 18 (17 - 18)  SpO2: 95% (95% - 96%)      PHYSICAL EXAM:  General - awake, alert, in NAD obese  HEENT - MMM, PERRL, EOMI, no oral lesions noted   CV - S1, S2 RRR no murmurs   Resp - Decreased breath sounds b/l, rhonchi at the bases, +trach  Abdomen - soft, obese +BS, Minimal tenderness to palpation around wound vac site with mild erythema, +wound vac, +J tube, +rectal tube   Extremities- moving all extremities, wwp, +2 pulses distal b/l       LABS:                                    7.3    7.0   )-----------( 277      ( 23 Jan 2017 10:59 )             24.2     23 Jan 2017 10:59    138    |  101    |  12     ----------------------------<  108    3.4     |  31     |  0.40     Ca    9.9        23 Jan 2017 10:59  Phos  2.4       23 Jan 2017 10:59  Mg     1.4       23 Jan 2017 10:59        MICROBIOLOGY:  Bl Cx 12/16, 12/24: NGTD   Surgical Swab (01.09.17 @ 20:04) Few Pseudomonas aeruginosa (multi drug resistant) Pseudomonas aeruginosa (multi drug resistant)      Abd fluid 12/11: strep milleri, moderate yeast     Bronchial wash 12/13, 12/24: MDR pseudomonas  Bronchial wash 12/14: yeast    Sputum 12/27: Moderate Pseudomonas, MDR        Pleural fluid: yellow, , glucose 79, Total protein 4.4, Nuc cell count 570, RBC 4550, segmented granulocytes 22, lymphocytes 55, monocyte/macrophage 11, eosinophil 7, mesothelial cells 5    Pleural fluid Cx 12/28: No growth    Abd fluid CHECO 12/30: MDR pseudomonas           RADIOLOGY & ADDITIONAL STUDIES:  CT c/a/p 1/3: Complete collapse of the right lower lobe. Complete collapse of the left   lower lobe. Small bilateral pleural effusions. 1. Persistent localized peripherally enhancing fluid collections within the pelvis most consistent with intraperitoneal abscesses. These have not significantly changed in size   when compared to the prior  Study. 2. S/p gastrectomy and gastrojejunal anastomosis with endoluminal stent in place. There has been removal of the nasogastric tube. 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 4. Grossly stable small bore percutaneous surgical drain in the right mid abdomen terminating in the left midabdomen with interval removal of the larger bore percutaneous  drainage catheter previously noted in the right midabdomen.  5. Stable hepatosplenomegaly.  6. Stable appearance of the left kidney demonstrating dilatation of the left renal pelvis versus a  parapelvic cyst and small 1 cm left cortical cyst. 7. Slightly decreasing subcutaneous edema      CXR 1/1: mild improvement in congestion and/or infiltrates     CT c/a/p w/ IV and green dye contrast 12/27: s/p gastrectomy and gastrojejunal anastomosis with a stent in place. 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  A few rim-enhancing fluid collections in the lower abdomen and pelvis which might be infected.Moderate to large left pleural effusion, enlarged since prior study. Small right pleural effusion. Extensive atelectatic changes in the lungs,   left greater than right.    CT c/a/p 12/21: 1.  Interval removal of endoluminal stent from the distal esophagus   extending to the jejunal loop. Interval placement of an esophageal stent. 2.  Slight interval decrease in small to moderate ascites, particularly   in the left upper quadrant surrounding the anastomosis. The largest pocket measures 1.3 x 6.6 cm in the midline upper pelvis, similar to the   prior study.3.  Further interval decrease in free air, with a a few punctate foci   remaining.4.  Slight interval decrease in small right pleural effusion. No change   in small left pleural effusion. Associated atelectasis bilaterally,   unchanged.5.  New anterior abdominal wall defect at the surgical incision site, presumably from interval debridement. 6.  Mild cardiomegaly and trace pericardial effusion, unchanged.  7.  Diffuse anasarca.      CXR 12/25: Improvement of opacification left hemithorax in comparison to   prior examination of the chest 12/25/2016. Bilateral effusions.   Underlying infiltrates cannot be excluded    CT ABDOMEN/PELVIS 01/12: Multiple intra-abdominal abscesses, most of which have decreased in size. The abscess posterior to the second portion of the duodenum has increased in size slightly. Persistent bilateral lobar collapse. Bilateral pleural effusions.

## 2017-01-23 NOTE — PROGRESS NOTE ADULT - ASSESSMENT
56 year old critical patient s/p SIPs procedure with free air concerning for anastomotic leak s/p emergent ex lap with abdominal washout found to have leak of esophagojejunostomy which they could not repair due to its location.  She is s/p esophageal stent placement, peritoneal washout and internal double pigtail stent placement. 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 She has had repeated bronchoscopies with 12/13 and 12/24 bronchial wash growing MDR pseudomonas. She had a pigtail placed, roughly 600cc fluid removed and chest tube removed. She had a CT scan which showed persistent fluid collections (abdominal abscesses) and collapsed lung b/l. Pt is now S/P IR drainage of abdominal collection. Repeat CT of the abdomen shows Multiple intra-abdominal abscesses, most of which have decreased in size. The abscess posterior to the second portion of the duodenum has increased in size slightly. Patient remains afebrile. Her Stent has been removed by GI, she is now decannulated.

## 2017-01-24 NOTE — PROGRESS NOTE ADULT - ASSESSMENT
Drain output decreased to 10cc in the past 24h. Recommend reassessing output tomorrow prior to considering removing the tube. Will continue to follow.    Dennis Snell MD  [phone]

## 2017-01-24 NOTE — PROGRESS NOTE ADULT - SUBJECTIVE AND OBJECTIVE BOX
on floor and in bed and comfortable and alert  tolerating po  very weak in extremities  at this point the biggest priority is rehab and muscle strength  will have dr linder   see and will call pt about increasing   also need to begin placement for rehab  wbc is normal  no temperature  still on antibiotics   continue nutrition to aid recovery

## 2017-01-24 NOTE — PROGRESS NOTE ADULT - ASSESSMENT
1/23/17 Hb 7.3 Hct 24.2. [facility] Duplex upper ext. vein scan; slightly decrease on thtrombus burden since 12/27/16.

## 2017-01-24 NOTE — PROGRESS NOTE ADULT - ASSESSMENT
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

## 2017-01-24 NOTE — ADVANCED PRACTICE NURSE CONSULT - ASSESSMENT
Right buttock with healing Stage 3 pressure ulcers. Upper right buttock wound still with slough in wound bed, site measures approx 3x2 cm. Lower right buttock site almost healed, approx 1x1 cm. Hyperpigmentation noted over bilat buttocks and evidence of healed pressure injuries. Pt with wound vac in place on abdomen. Assisted in removal of rectal tube per MD order.

## 2017-01-24 NOTE — PROGRESS NOTE ADULT - SUBJECTIVE AND OBJECTIVE BOX
OVERNIGHT EVENTS: No acute events overnight         SUBJECTIVE:  Flatus: [X] YES [] NO             Bowel Movement: [X ] YES [ ] NO  Pain (0-10):            Pain Control Adequate: [X ] YES [ ] NO  Nausea: [ ] YES [X ] NO            Vomiting: [ ] YES [X ] NO  Diarrhea: [ ] YES [X ] NO         Constipation: [ ] YES [X ] NO     Chest Pain: [ ] YES [X ] NO    SOB:  [ ] YES [X ] NO    colistimethate IVPB 100milliGRAM(s) IV Intermittent every 12 hours  meropenem IVPB 2000milliGRAM(s) IV Intermittent every 8 hours  enoxaparin Injectable 80milliGRAM(s) SubCutaneous <User Schedule>  vancomycin    Solution 125milliGRAM(s) Oral every 6 hours  losartan 100milliGRAM(s) Oral daily      Vital Signs Last 24 Hrs  T(C): 37, Max: 37.2 (01-23 @ 16:53)  T(F): 98.6, Max: 98.9 (01-23 @ 16:53)  HR: 94 (94 - 98)  BP: 158/98 (146/93 - 168/99)  BP(mean): --  RR: 17 (15 - 18)  SpO2: 99% (90% - 99%)  I&O's Detail      General: NAD, resting comfortably in bed  C/V: NSR  Pulm: Nonlabored breathing, no respiratory distress  Abd:   Extrem: WWP, no edema, SCDs in place        LABS:                        7.3    7.0   )-----------( 277      ( 23 Jan 2017 10:59 )             24.2     23 Jan 2017 10:59    138    |  101    |  12     ----------------------------<  108    3.4     |  31     |  0.40     Ca    9.9        23 Jan 2017 10:59  Phos  2.4       23 Jan 2017 10:59  Mg     1.4       23 Jan 2017 10:59            RADIOLOGY & ADDITIONAL STUDIES: OVERNIGHT EVENTS: No acute events overnight         SUBJECTIVE:  Flatus: [X] YES [] NO             Bowel Movement: [X ] YES [ ] NO  Pain (0-10):            Pain Control Adequate: [X ] YES [ ] NO  Nausea: [ ] YES [X ] NO            Vomiting: [ ] YES [X ] NO  Diarrhea: [ ] YES [X ] NO         Constipation: [ ] YES [X ] NO     Chest Pain: [ ] YES [X ] NO    SOB:  [ ] YES [X ] NO    colistimethate IVPB 100milliGRAM(s) IV Intermittent every 12 hours  meropenem IVPB 2000milliGRAM(s) IV Intermittent every 8 hours  enoxaparin Injectable 80milliGRAM(s) SubCutaneous <User Schedule>  vancomycin    Solution 125milliGRAM(s) Oral every 6 hours  losartan 100milliGRAM(s) Oral daily      Vital Signs Last 24 Hrs  T(C): 37, Max: 37.2 (01-23 @ 16:53)  T(F): 98.6, Max: 98.9 (01-23 @ 16:53)  HR: 94 (94 - 98)  BP: 158/98 (146/93 - 168/99)  BP(mean): --  RR: 17 (15 - 18)  SpO2: 99% (90% - 99%)  I&O's Detail      General: NAD, resting comfortably in bed  Pulm: Nonlabored breathing, no respiratory distress  Abd: Soft, ND, NT, Inc open w/ wound vac in place, J tube with dressings in tact, LUQ port incision w/ minimal serous drainage, dressings replaced  Extrem: WWP, no edema, SCDs in place        LABS:                        7.3    7.0   )-----------( 277      ( 23 Jan 2017 10:59 )             24.2     23 Jan 2017 10:59    138    |  101    |  12     ----------------------------<  108    3.4     |  31     |  0.40     Ca    9.9        23 Jan 2017 10:59  Phos  2.4       23 Jan 2017 10:59  Mg     1.4       23 Jan 2017 10:59            RADIOLOGY & ADDITIONAL STUDIES:    EXAM:  US DPLX UPR EXT VEINS COMPL BI                           PROCEDURE DATE:  01/23/2017                 INTERPRETATION:  VENOUS DUPLEX DOPPLER ULTRASOUND OF THE VEINS OF THE   RIGHT AND LEFT NECK AND UPPER EXTREMITY dated 1/23/2017 2:28 PM    INDICATION: Swelling. Assess for thrombus.     TECHNIQUE: Duplex Doppler evaluation including gray-scale ultrasound   imaging, color flow Doppler imaging, and Doppler spectral analysis of the   veins of the right and left neck and upper extremity was performed.    COMPARISON: Venous Doppler dated 12/27/2016.    FINDINGS: The internal jugular veins are patent and free of thrombus   bilaterally. The visualized brachiocephalic veins are patent. The   brachial, basilic, and cephalic veins are patent and compressible   bilaterally. The previously seen right PICC line has been removed.   Nonocclusive thrombus continues to be present in the right subclavian and   axillary veins. No thrombus is seen in the left subclavian and axillary   veins.      IMPRESSION:   Slight decrease in thrombus burden since 12/27/2016.              "Thank you for the opportunity to participate in the care of this   patient."        ABNER MCMANUS M.D., ATTENDING RADIOLOGIST  This document has been electronically signed.Jan 23 2017  3:18PM

## 2017-01-24 NOTE — PROGRESS NOTE ADULT - SUBJECTIVE AND OBJECTIVE BOX
56F s/p CT-guided drainage of an abdominal abscess. Pt has no complaints at the moment. She remains afebrile. Drain is draining cloudy serous drainage and output was 10cc in the past 24h. Drain was flushed with 3cc of NS without resistance.

## 2017-01-25 NOTE — PROGRESS NOTE ADULT - ASSESSMENT
Drain output has been minimal. Would consider removing the drain tomorrow if the team is amenable to it. Will continue to follow.    Dennis Snell MD  [phone]

## 2017-01-25 NOTE — PROGRESS NOTE ADULT - SUBJECTIVE AND OBJECTIVE BOX
56F s/p CT-guided drainage of an abdominal abscess. Pt has no complaints at the moment. She remains afebrile. Drain is draining cloudy serous drainage and output was 15cc in the past 24h. Drain was flushed with 3cc of NS without resistance.

## 2017-01-25 NOTE — PROGRESS NOTE ADULT - SUBJECTIVE AND OBJECTIVE BOX
24 Hour Events: No acute events overnight   1/24: OOB to chair, worked with PT. Rectal tube removed.         SUBJECTIVE: Patient denies any complaints   Flatus: [X] YES [] NO             Bowel Movement: [X ] YES [ ] NO  Pain (0-10):            Pain Control Adequate: [X ] YES [ ] NO  Nausea: [ ] YES [X ] NO            Vomiting: [ ] YES [X ] NO  Diarrhea: [ ] YES [X ] NO         Constipation: [ ] YES [ X] NO     Chest Pain: [ ] YES [X ] NO    SOB:  [ ] YES [X ] NO    colistimethate IVPB 100milliGRAM(s) IV Intermittent every 12 hours  meropenem IVPB 2000milliGRAM(s) IV Intermittent every 8 hours  vancomycin    Solution 125milliGRAM(s) Oral every 6 hours  losartan 100milliGRAM(s) Oral daily  enoxaparin Injectable 80milliGRAM(s) SubCutaneous <User Schedule>      Vital Signs Last 24 Hrs  T(C): 35.9, Max: 37 (01-24 @ 09:15)  T(F): 96.6, Max: 98.6 (01-24 @ 09:15)  HR: 92 (86 - 100)  BP: 152/86 (115/72 - 173/110)  BP(mean): --  RR: 18 (17 - 18)  SpO2: 96% (93% - 98%)  I&O's Detail      General: NAD, resting comfortably in bed  C/V: NSR  Pulm: Nonlabored breathing, no respiratory distress  Abd: soft, NT/ND.  Extrem: WWP, no edema, SCDs in place  Wound vac functioning       LABS:                        7.3    7.0   )-----------( 277      ( 23 Jan 2017 10:59 )             24.2     23 Jan 2017 10:59    138    |  101    |  12     ----------------------------<  108    3.4     |  31     |  0.40     Ca    9.9        23 Jan 2017 10:59  Phos  2.4       23 Jan 2017 10:59  Mg     1.4       23 Jan 2017 10:59            RADIOLOGY & ADDITIONAL STUDIES:

## 2017-01-25 NOTE — PROGRESS NOTE ADULT - ASSESSMENT
56YOF s/p bypass revision following failed SIPS (anastamotic leak), found to have draining intra-abdominal abscess, resolving acute respiratory failure, RUE DVT, pna and cdiff    Soft mechanical diet   Osmolite 1.2 Edy tube feeding   Physical therapy  Continue abx treatment Vanco , Meropenem , Colistin   discharge planning to [facility]

## 2017-01-26 NOTE — PROGRESS NOTE ADULT - SUBJECTIVE AND OBJECTIVE BOX
55 yo female c/o general abdominal discomfort x 2 days and chills.  Pt states she was also experiencing nausea yesterday.  Admits to having had soda, steak and chips yesterday.  Has been oob and is able to walk 6 steps. Pt states the abdominal pain is generalized and slightly better today. Denies nausea today.  Denies fever, vomiting, chest pain, sob, leg pain.                          8.3    6.3   )-----------( 306      ( 25 Jan 2017 07:18 )             27.7       25 Jan 2017 07:18    139    |  99     |  15     ----------------------------<  87     3.2     |  34     |  0.50     Ca    10.5       25 Jan 2017 07:18  Phos  2.8       25 Jan 2017 07:18  Mg     2.0       25 Jan 2017 07:18    T(C): 36.3, Max: 36.9 (01-26 @ 05:33)  HR: 107 (90 - 107)  BP: 155/93 (140/72 - 163/90)  RR: 17 (16 - 18)  SpO2: 95% (85% - 98%)  Wt(kg): --      gen: A&0x3, NAD  Heart: s1,s2 tachycardic, regular rhymn   Lung: decreased lung sounds B/L lower lobes  Abd: decreased bowel sounds, n/d, soft, mild tenderness to palpation, incisions c/d/i  LE: no edema, b/l LE, no gerard b/l LE, neg homanns b/l   skin: MMM, pale    55 yo female with complicated postop course, multiple leaks, mdr pseudomonas, cdiff, intermittent tachycardia, is progressing diet, abdominal discomfort improved today and normal exam, discomfort probably related to food intake as patient ate steak for the first time yesterday even though she is supposed to be on mechanical soft, patient is scheduled to have ct this weekend, afebrile and normal WBCS. Dr. tucker and team informed       Plan:  soft diet per dr tucker  watch vitals and per dr tucker ct scan if fever, also monitor heart rate closely  oob, scds, lovenox  incentive spirometer encouraged  monitor I&O  ppi  nausea control prn  pain control prn  c/w abx

## 2017-01-26 NOTE — CONSULT NOTE ADULT - CONSULT REASON
Evaluate for biliary leak
Fever, concern for VRE
Acute renal failure
RUE DVT
Rehabilitation
acute hypotension
evaluation for vascular access
temporary hemodialysis catheter insertion

## 2017-01-26 NOTE — CONSULT NOTE ADULT - ASSESSMENT
56 year old critical patient s/p SIPs procedure with free air concerning for anastomotic leak s/p emergent ex lap with abdominal washout found to have leak of esophagojejunostomy which they could not repair due to its location. GI consulted to evaluate for possible Endoscopic evaluation.  Given the tenuous nature of patient condition (high dose pressors x2, low urine out put, borderline MAP) will hold off on endoscopic intervention. Will continue to monitor, it patients status improves will evaluate for bedside intervention.    -Cont IV abx  -IVF, pressors  -Serial abdominal exams  -Care per SICU    -GI following.     Case d/w Dr. Sumner
55 y/o female with no significant renal history now with ASA s/p gastric surgery.
56YOF s/p bypass revision following failed SIPS with anastomotic leak with RUE DVT  -Slightly decreased thrombus burden since previous duplex  -pt has been on lovenox - continue lovenox for 6 months  -please have pt follow up with Dr Velazco in the office when discharged
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  No leukocytosis, concern by primary team for presence of 7% bands on 12/16.  She has not defervesced completely with any of the interventions.  Would be concerned about infected intraabdominal collections being read as ascites.  The one abd fluid collection from 12/11 grew a very susceptible viridans strep but she now has been on very broad spectrum antibiotics.  In terms of other sources, significance of Pseudomonas in bronch wash is unclear as there were no WBCs on the gram stain.  Similarly, the significance of the HSV isolated from the bronch is unclear – except in very severely immunocompromised pts (eg transplant recipients), this result usually represents shedding.  CXRs are difficult but her bronchs did not show WBCs and she has a left pleural effusion – small bilateral effusions were seen on CT 12/21, no pneumonia.  No evidence for urinary source, PICC is relatively recent.  Culture data are remarkably scarce – I am told she had blood and respiratory cultures today but these have not yet reached computer.  I approved linezolid for VRE earlier but she has barely received vancomycin.  She  has been on fluconazole for almost 3 weeks without evidence for Candida in the abd fluid (though I am not completely sure the context of that culture).  Would start to peel of f anti-infectives and culture when febrile (T101.5).  Suggest:  -	Would explore IR aspiration of 11.6 cm X 6 cm collection with radiology  -	Consider diagnostic thorocentesis if amenable though less likely source  -	D/C acyclovir and fluconazole   -	Continue imipenem for now  -	Would NOT start treatment for VRE at this time  -	If febrile (T 101.5), please obtain blood, respiratory cultures and UA. Could add vancomycin for resistant gram positive organisms.  Recommendations discussed with primary team.  Will follow with you.

## 2017-01-26 NOTE — CONSULT NOTE ADULT - SUBJECTIVE AND OBJECTIVE BOX
HPI:  56F with sleeve gastrectomy 2012 complicated by stricture and leak. 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 Pt with persistent vomiting. Pt presents for gastrectomy. Pt s/p bypass revision following failed SIPS (anastamotic leak). Pt had long ICU course, had RUE DVT with R PICC line, now with repeat duplex showing persistent thrombus. Has been on lovenox      PAST MEDICAL & SURGICAL HISTORY:  Reflux  Obesity  DVT (deep venous thrombosis): 2013  Depression  Diverticulitis  Hypertension  Gastric bypass status for obesity: gastric sleeve, 6/2012  S/P breast biopsy: 2011, left  S/P colon resection: 2011  S/P knee surgery: repair 2009, 2011  Umbilical hernia: 2000  S/P appendectomy: 1975  S/P tonsillectomy: 1967      REVIEW OF SYSTEMS - negative except as per HPI      MEDICATIONS  (STANDING):  ascorbic acid Syrup 500milliGRAM(s) Oral daily  zinc sulfate 220milliGRAM(s) Oral daily  colistimethate IVPB 100milliGRAM(s) IV Intermittent every 12 hours  multivitamin 1Tablet(s) Oral daily  meropenem IVPB 2000milliGRAM(s) IV Intermittent every 8 hours  cyanocobalamin Injectable 1000MICROGram(s) IntraMuscular every 7 days  cholecalciferol 2000Unit(s) Oral daily  collagenase Ointment 1Application(s) Topical daily  thiamine 100milliGRAM(s) Oral daily  vancomycin    Solution 125milliGRAM(s) Oral every 6 hours  escitalopram Solution 20milliGRAM(s) Oral daily  hydrocortisone 1% Cream 1Application(s) Topical two times a day  losartan 100milliGRAM(s) Oral daily  diazepam    Tablet 5milliGRAM(s) Oral at bedtime  nystatin Powder 1Application(s) Topical daily  ferrous    sulfate 60 mG/mL Liquid 300milliGRAM(s) Enteral Tube daily  enoxaparin Injectable 80milliGRAM(s) SubCutaneous <User Schedule>  pantoprazole  Injectable 40milliGRAM(s) IV Push daily  ALBUTerol/ipratropium for Nebulization 3milliLiter(s) Nebulizer every 6 hours    MEDICATIONS  (PRN):  acetaminophen   Tablet. 325milliGRAM(s) Oral every 4 hours PRN Moderate Pain (4 - 6)  diphenhydrAMINE   Injectable 25milliGRAM(s) IV Push every 6 hours PRN Itching  ondansetron    Tablet 4milliGRAM(s) Oral every 8 hours PRN Nausea and/or Vomiting  ondansetron Injectable 4milliGRAM(s) IV Push every 6 hours PRN Nausea and/or Vomiting  acetaminophen    Suspension 650milliGRAM(s) Enteral Tube every 6 hours PRN For Temp greater than 38 C (100.4 F)  oxyCODONE  5 mG/acetaminophen 325 mG 1Tablet(s) Oral every 4 hours PRN Severe Pain (7 - 10)      Allergies    sulfa drugs (Unknown)  sulfamethoxazole (Other)    Intolerances        SOCIAL HISTORY:    FAMILY HISTORY:  No pertinent family history in first degree relatives      Vital Signs Last 24 Hrs  T(C): 36.5, Max: 37.3 (01-25 @ 17:38)  T(F): 97.7, Max: 99.2 (01-25 @ 17:38)  HR: 103 (90 - 103)  BP: 140/88 (140/72 - 163/90)  BP(mean): --  RR: 18 (16 - 18)  SpO2: 85% (85% - 98%)    PHYSICAL EXAM:  General: NAD, resting comfortably in bed  C/V: NSR  Pulm: Nonlabored breathing, no respiratory distress  Abd: soft, NT/ND.  Extrem: WWP, no edema, SCDs in place  Wound vac functioning         LABS:                        8.3    6.3   )-----------( 306      ( 25 Jan 2017 07:18 )             27.7     25 Jan 2017 07:18    139    |  99     |  15     ----------------------------<  87     3.2     |  34     |  0.50     Ca    10.5       25 Jan 2017 07:18  Phos  2.8       25 Jan 2017 07:18  Mg     2.0       25 Jan 2017 07:18            RADIOLOGY & ADDITIONAL STUDIES:

## 2017-01-26 NOTE — CONSULT NOTE ADULT - CONSULT REQUESTED DATE/TIME
03-Dec-2016 11:46
25-Dec-2016 14:00
02-Dec-2016 19:45
04-Dec-2016 16:38
05-Dec-2016 21:54
16-Dec-2016 08:01
22-Dec-2016 10:59
26-Jan-2017

## 2017-01-26 NOTE — PROGRESS NOTE ADULT - SUBJECTIVE AND OBJECTIVE BOX
OVERNIGHT EVENTS: No acute events overnight   1/25 wound vac changed, OOB to chair , AWAITING [facility]         SUBJECTIVE:  Flatus: [x] YES [] NO             Bowel Movement: [x] YES [] NO  Pain (0-10):            Pain Control Adequate: [x ] YES [ ] NO  Nausea: [ ] YES [x] NO            Vomiting: [ ] YES [x] NO  Diarrhea: [ ] YES [x] NO         Constipation: [ ] YES [x] NO     Chest Pain: [ ] YES [x] NO    SOB:  [ ] YES [x] NO    colistimethate IVPB 100milliGRAM(s) IV Intermittent every 12 hours  meropenem IVPB 2000milliGRAM(s) IV Intermittent every 8 hours  vancomycin    Solution 125milliGRAM(s) Oral every 6 hours  losartan 100milliGRAM(s) Oral daily  enoxaparin Injectable 80milliGRAM(s) SubCutaneous <User Schedule>      Vital Signs Last 24 Hrs  T(C): 36.9, Max: 37.3 (01-25 @ 17:38)  T(F): 98.5, Max: 99.2 (01-25 @ 17:38)  HR: 94 (90 - 106)  BP: 163/90 (140/72 - 163/90)  BP(mean): --  RR: 17 (16 - 18)  SpO2: 98% (91% - 98%)  I&O's Detail      General: NAD, resting comfortably in bed  C/V: NSR  Pulm: Nonlabored breathing, no respiratory distress  Abd: soft, NT/ND.  Extrem: WWP, no edema, SCDs in place  Wound vac functioning   J tube functioning     LABS:                        8.3    6.3   )-----------( 306      ( 25 Jan 2017 07:18 )             27.7     25 Jan 2017 07:18    139    |  99     |  15     ----------------------------<  87     3.2     |  34     |  0.50     Ca    10.5       25 Jan 2017 07:18  Phos  2.8       25 Jan 2017 07:18  Mg     2.0       25 Jan 2017 07:18            RADIOLOGY & ADDITIONAL STUDIES:

## 2017-01-26 NOTE — CONSULT NOTE ADULT - PROVIDER SPECIALTY LIST ADULT
Infectious Disease
Nephrology
Radiology
Rehab Medicine
SICU
Surgery
Vascular Surgery
Gastroenterology

## 2017-01-26 NOTE — PROGRESS NOTE ADULT - ASSESSMENT
56YOF s/p bypass revision following failed SIPS (anastamotic leak), found to have draining intra-abdominal abscess, resolving acute respiratory failure, RUE DVT, pna and cdiff    Soft mechanical diet   Osmolite 1.2 Edy tube feeding   Physical therapy  Continue abx treatment Vanco , Meropenem , Colistin   discharge planning to [facility]   OOB/AMB/SCD'S

## 2017-01-27 NOTE — PROGRESS NOTE ADULT - SUBJECTIVE AND OBJECTIVE BOX
24hr Events:  O/N: BRAYDEN  1/26: D/c monday per [facility].  no acute events awaiting [facility] vascular recs continue lovenox for 6 months and follow up with Dr. Dr Velazco outpatient 24hr Events:  O/N: BRAYDEN  1/26: D/c monday per Caitlyn.  no acute events awaiting [facility] vascular recs continue lovenox for 6 months and follow up with Dr. Dr Velazco outpatient        Assessment/Plan:  56YOF s/p bypass revision following failed SIPS (anastamotic leak), found to have draining intra-abdominal abscess, resolving acute respiratory failure, RUE DVT, pna and cdiff    Soft mechanical diet   Osmolite 1.2 Edy tube feeding   Physical therapy  Continue abx treatment Vanco, Meropenem , Colistin   discharge planning to [facility]   OOB/AMB/SCD'S 24hr Events:  O/N: BRAYDEN  1/26: D/c monday per Caitlyn.  no acute events awaiting [facility] vascular recs continue lovenox for 6 months and follow up with Dr. Dr Velazco outpatient            STATUS POST:    11/28: Harmony-en-Y reconstruction  12/3: ex lap, abdominal washout, placement of a feeding j tube in common channel distal to Russel anastomosis, and placement of abdominal drains.    SUBJECTIVE:  Pt seen and examined with surgery chief resident on AM rounds. Pt w/ no complaints at this time. Reports improved PO intake. Reports no abdominal pain, no n/v, continued bowel function.     MEDICATIONS  (STANDING):  ascorbic acid Syrup 500milliGRAM(s) Oral daily  zinc sulfate 220milliGRAM(s) Oral daily  colistimethate IVPB 100milliGRAM(s) IV Intermittent every 12 hours  multivitamin 1Tablet(s) Oral daily  meropenem IVPB 2000milliGRAM(s) IV Intermittent every 8 hours  cyanocobalamin Injectable 1000MICROGram(s) IntraMuscular every 7 days  cholecalciferol 2000Unit(s) Oral daily  collagenase Ointment 1Application(s) Topical daily  thiamine 100milliGRAM(s) Oral daily  vancomycin    Solution 125milliGRAM(s) Oral every 6 hours  escitalopram Solution 20milliGRAM(s) Oral daily  losartan 100milliGRAM(s) Oral daily  diazepam    Tablet 5milliGRAM(s) Oral at bedtime  nystatin Powder 1Application(s) Topical daily  ferrous    sulfate 60 mG/mL Liquid 300milliGRAM(s) Enteral Tube daily  enoxaparin Injectable 80milliGRAM(s) SubCutaneous <User Schedule>  pantoprazole  Injectable 40milliGRAM(s) IV Push daily  ALBUTerol/ipratropium for Nebulization 3milliLiter(s) Nebulizer every 6 hours  magnesium sulfate  IVPB 4Gram(s) IV Intermittent once    MEDICATIONS  (PRN):  acetaminophen   Tablet. 325milliGRAM(s) Oral every 4 hours PRN Moderate Pain (4 - 6)  diphenhydrAMINE   Injectable 25milliGRAM(s) IV Push every 6 hours PRN Itching  ondansetron    Tablet 4milliGRAM(s) Oral every 8 hours PRN Nausea and/or Vomiting  ondansetron Injectable 4milliGRAM(s) IV Push every 6 hours PRN Nausea and/or Vomiting  acetaminophen    Suspension 650milliGRAM(s) Enteral Tube every 6 hours PRN For Temp greater than 38 C (100.4 F)  oxyCODONE  5 mG/acetaminophen 325 mG 1Tablet(s) Oral every 4 hours PRN Severe Pain (7 - 10)      Vital Signs Last 24 Hrs  T(C): 36.2, Max: 36.6 (01-26 @ 09:30)  T(F): 97.2, Max: 97.8 (01-26 @ 09:30)  HR: 92 (90 - 107)  BP: 142/92 (140/88 - 158/99)  BP(mean): --  RR: 18 (16 - 18)  SpO2: 92% (85% - 95%)    PHYSICAL EXAM:      Constitutional: A&Ox3    Respiratory: non labored breathing, no respiratory distress    Gastrointestinal: Soft, ND, NT                 Incision: Open, wound vac in place   - J tube present w/ minimal seropurulent drainage around abdominal insertion site       Genitourinary: voids     Extremities: (-) edema                  I&O's Detail    I & Os for current day (as of 27 Jan 2017 07:03)  =============================================  IN:    Osmolite: 636 ml    Solution: 166.5 ml    Solution: 50 ml    Total IN: 852.5 ml  ---------------------------------------------  OUT:    Voided: 2550 ml    Bulb: 2 ml    Total OUT: 2552 ml  ---------------------------------------------  Total NET: -1699.5 ml      LABS:                        8.2    8.0   )-----------( 280      ( 27 Jan 2017 06:28 )             26.7     27 Jan 2017 06:28    138    |  102    |  15     ----------------------------<  94     4.0     |  30     |  0.42     Ca    10.3       27 Jan 2017 06:28  Phos  2.6       27 Jan 2017 06:28  Mg     1.5       27 Jan 2017 06:28            RADIOLOGY & ADDITIONAL STUDIES:    EXAM:  XR CHEST 1 VIEW PORT ROUTINE                           PROCEDURE DATE:  01/24/2017                 INTERPRETATION:  Indication: eval pna    A single portable view of the chest is submitted. Comparison is made to   the most recent prior studydated 1/4/2017. The tracheotomy tube is no   longer visualized. There is been interval decrease in the vascular   markings. There are residual interstitial markings at the right lung   base. The left costophrenic angle is obscured and there is increased   retrocardiac opacity.     Impression:    Persistent retrocardiac opacity. PA and lateral views the chest are   recommended.            "Thank you for the opportunity to participate in the care of this   patient."        JULIANA BERGER M.D., ATTENDING RADIOLOGIST  This document has been electronically signed. Jan 24 2017  4:20PM

## 2017-01-27 NOTE — PROGRESS NOTE ADULT - ASSESSMENT
Assessment/Plan:  56YOF s/p bypass revision following failed SIPS (anastamotic leak), found to have draining intra-abdominal abscess, resolving acute respiratory failure, RUE DVT, pna and cdiff    Soft mechanical diet   Osmolite 1.2 Edy tube feeding   Physical therapy  Continue abx treatment Vanco, Meropenem , Colistin   discharge planning to [facility]   OOB/AMB/SCD'S  NO Labs over weekend unless acute clinical change

## 2017-01-27 NOTE — PROGRESS NOTE ADULT - SUBJECTIVE AND OBJECTIVE BOX
57 yo female pmh obesity, htn and s/p revisional bariatric surgery complicated by leaks c/o itching around her wound vacc site today.  Pt states her abdominal pain has completely stopped and she no longer feels the chills that she was experiencing yesterday.   Pt just finished PT and states she feels stronger today and was able to take about 6 steps.  Denies fever, chilsl, n/v, chest pain, sob, abdominal pain, leg pain, back pain, lightheadedness, dizziness.                          8.2    8.0   )-----------( 280      ( 27 Jan 2017 06:28 )             26.7     27 Jan 2017 06:28    138    |  102    |  15     ----------------------------<  94     4.0     |  30     |  0.42     Ca    10.3       27 Jan 2017 06:28  Phos  2.6       27 Jan 2017 06:28  Mg     1.5       27 Jan 2017 06:28      T(C): 36.1, Max: 36.3 (01-26 @ 17:30)  HR: 104 (88 - 107)  BP: 131/81 (131/81 - 158/99)  RR: 17 (16 - 18)  SpO2: 97% (92% - 97%)  Wt(kg): --      gen: A&0x3, NAD  Heart: s1,s2 RRR,   Lung: decreased lung sounds b/l lower lobes, no wheeze, rhonchi or rales  Abd: decreased BS, n/d, n/t, soft, incisions c/d/i  LE: no edema, b/l LE, no gerard b/l LE  skin: MMM, pale, no rash    assessment: 57 yo female s/p revisional bariatric surgery complicated by multiple leaks, stent placement, jtube placement, mdr pseudomonas, cdiff improved today.  VSS, no complaints, oob, improved pt, improving diet       Plan:  benadryl prn for itching  mechanical soft diet/jtube feeds  incentive spirometer  oob, scds, lovenox  ppi  nausea control prn  pain control prn  aggressive PT 55 yo female pmh obesity, htn and s/p revisional bariatric surgery complicated by leaks c/o itching around her wound vacc site today.  Pt states her abdominal pain has completely stopped and she no longer feels the chills that she was experiencing yesterday.   Pt just finished PT and states she feels stronger today and was able to take about 6 steps.  Denies fever, chilsl, n/v, chest pain, sob, abdominal pain, leg pain, back pain, lightheadedness, dizziness.                          8.2    8.0   )-----------( 280      ( 27 Jan 2017 06:28 )             26.7     27 Jan 2017 06:28    138    |  102    |  15     ----------------------------<  94     4.0     |  30     |  0.42     Ca    10.3       27 Jan 2017 06:28  Phos  2.6       27 Jan 2017 06:28  Mg     1.5       27 Jan 2017 06:28      T(C): 36.1, Max: 36.3 (01-26 @ 17:30)  HR: 104 (88 - 107)  BP: 131/81 (131/81 - 158/99)  RR: 17 (16 - 18)  SpO2: 97% (92% - 97%)  Wt(kg): --      gen: A&0x3, NAD  Heart: s1,s2 RRR,   Lung: decreased lung sounds b/l lower lobes, no wheeze, rhonchi or rales  Abd: decreased BS, n/d, n/t, soft, incisions c/d/i  LE: no edema, b/l LE, no gerard b/l LE  skin: MMM, pale, no rash    assessment: 55 yo female s/p revisional bariatric surgery complicated by multiple leaks, stent placement, jtube placement, mdr pseudomonas, cdiff improved today.  VSS, no complaints, oob, improved pt, improving diet       Plan:  c/w abx, vanc, meropenem, cilastin  benadryl prn for itching  mechanical soft diet/jtube feeds  incentive spirometer  oob, scds, lovenox  ppi  nausea control prn  pain control prn  aggressive PT 55 yo female pmh obesity, htn and s/p revisional bariatric surgery complicated by leaks c/o itching around her wound vacc site today.  Pt states her abdominal pain has completely stopped and she no longer feels the chills that she was experiencing yesterday.   Pt just finished PT and states she feels stronger today and was able to take about 6 steps.  Denies fever, chilsl, n/v, chest pain, sob, abdominal pain, leg pain, back pain, lightheadedness, dizziness.                          8.2    8.0   )-----------( 280      ( 27 Jan 2017 06:28 )             26.7     27 Jan 2017 06:28    138    |  102    |  15     ----------------------------<  94     4.0     |  30     |  0.42     Ca    10.3       27 Jan 2017 06:28  Phos  2.6       27 Jan 2017 06:28  Mg     1.5       27 Jan 2017 06:28      T(C): 36.1, Max: 36.3 (01-26 @ 17:30)  HR: 104 (88 - 107)  BP: 131/81 (131/81 - 158/99)  RR: 17 (16 - 18)  SpO2: 97% (92% - 97%)  Wt(kg): --      gen: A&0x3, NAD  Heart: s1,s2 RRR,   Lung: decreased lung sounds b/l lower lobes, no wheeze, rhonchi or rales  Abd: decreased BS, n/d, n/t, soft, incisions c/d/i  LE: no edema, b/l LE, no gerard b/l LE  skin: MMM, pale, no rash    assessment: 55 yo female s/p revisional bariatric surgery complicated by multiple leaks, stent placement, jtube placement, mdr pseudomonas, cdiff improved today.  VSS, no complaints, oob, improved pt, improving diet       Plan:  c/w abx, vanc, meropenem, colistin  benadryl prn for itching  mechanical soft diet/jtube feeds  incentive spirometer  oob, scds, lovenox  ppi  nausea control prn  pain control prn  aggressive PT

## 2017-01-28 NOTE — PROGRESS NOTE ADULT - SUBJECTIVE AND OBJECTIVE BOX
INTERVAL HPI/OVERNIGHT EVENTS: O/N: BRAYDEN, AVSS  1/27: wound vac changed. WBC wnl, H/H Stable. LAB HOLIDAY OVER WEEKEND     STATUS POST:  11/28: Harmony-en-Y reconstruction  12/3: ex lap, abdominal washout, placement of a feeding j tube in common channel distal to Russel anastomosis, and placement of abdominal drains. INTERVAL HPI/OVERNIGHT EVENTS: O/N: BRAYDEN, AVSS  1/27: wound vac changed. WBC wnl, H/H Stable. LAB HOLIDAY OVER WEEKEND     STATUS POST:  11/28: Harmony-en-Y reconstruction  12/3: ex lap, abdominal washout, placement of a feeding j tube in common channel distal to Russel anastomosis, and placement of abdominal drains.    SUBJECTIVE:  Pt seen and examined on AM rounds with surgery chief resident. Pt w/ no complaints at this time. BRAYDEN overnight per nursing staff.     MEDICATIONS  (STANDING):  ascorbic acid Syrup 500milliGRAM(s) Oral daily  zinc sulfate 220milliGRAM(s) Oral daily  colistimethate IVPB 100milliGRAM(s) IV Intermittent every 12 hours  multivitamin 1Tablet(s) Oral daily  meropenem IVPB 2000milliGRAM(s) IV Intermittent every 8 hours  cyanocobalamin Injectable 1000MICROGram(s) IntraMuscular every 7 days  cholecalciferol 2000Unit(s) Oral daily  collagenase Ointment 1Application(s) Topical daily  thiamine 100milliGRAM(s) Oral daily  vancomycin    Solution 125milliGRAM(s) Oral every 6 hours  escitalopram Solution 20milliGRAM(s) Oral daily  losartan 100milliGRAM(s) Oral daily  diazepam    Tablet 5milliGRAM(s) Oral at bedtime  nystatin Powder 1Application(s) Topical daily  ferrous    sulfate 60 mG/mL Liquid 300milliGRAM(s) Enteral Tube daily  enoxaparin Injectable 80milliGRAM(s) SubCutaneous <User Schedule>  pantoprazole  Injectable 40milliGRAM(s) IV Push daily  ALBUTerol/ipratropium for Nebulization 3milliLiter(s) Nebulizer every 6 hours    MEDICATIONS  (PRN):  acetaminophen   Tablet. 325milliGRAM(s) Oral every 4 hours PRN Moderate Pain (4 - 6)  diphenhydrAMINE   Injectable 25milliGRAM(s) IV Push every 6 hours PRN Itching  ondansetron    Tablet 4milliGRAM(s) Oral every 8 hours PRN Nausea and/or Vomiting  ondansetron Injectable 4milliGRAM(s) IV Push every 6 hours PRN Nausea and/or Vomiting  acetaminophen    Suspension 650milliGRAM(s) Enteral Tube every 6 hours PRN For Temp greater than 38 C (100.4 F)  oxyCODONE  5 mG/acetaminophen 325 mG 1Tablet(s) Oral every 4 hours PRN Severe Pain (7 - 10)      Vital Signs Last 24 Hrs  T(C): 36.9, Max: 36.9 (01-28 @ 05:42)  T(F): 98.4, Max: 98.4 (01-28 @ 05:42)  HR: 102 (88 - 107)  BP: 145/90 (131/81 - 179/109)  BP(mean): --  RR: 17 (16 - 17)  SpO2: 84% (84% - 98%)    PHYSICAL EXAM:      Constitutional: A&Ox3    Respiratory: non labored breathing, no respiratory distress    Gastrointestinal: Soft, ND, NT, Wound Vac in place functioning properly, J tube in place                 Incision: Open, Wound vac in place    Genitourinary: voids     Extremities: (-) edema                  I&O's Detail    I & Os for current day (as of 28 Jan 2017 07:54)  =============================================  IN:    Osmolite: 1166 ml    Oral Fluid: 250 ml    Solution: 200 ml    Solution: 100 ml    Total IN: 1716 ml  ---------------------------------------------  OUT:    Voided: 750 ml    VAC (Vacuum Assisted Closure) System: 100 ml    Bulb: 1 ml    Total OUT: 851 ml  ---------------------------------------------  Total NET: 865 ml      LABS:                        8.2    8.0   )-----------( 280      ( 27 Jan 2017 06:28 )             26.7     27 Jan 2017 06:28    138    |  102    |  15     ----------------------------<  94     4.0     |  30     |  0.42     Ca    10.3       27 Jan 2017 06:28  Phos  2.6       27 Jan 2017 06:28  Mg     1.5       27 Jan 2017 06:28            RADIOLOGY & ADDITIONAL STUDIES:

## 2017-01-28 NOTE — PROGRESS NOTE ADULT - ASSESSMENT
57 yo F s/p above procedure    c/w abx, vanc, meropenem, colistin  benadryl prn for itching  mechanical soft diet/jtube feeds  incentive spirometer  oob, scds, lovenox  ppi  nausea control prn  pain control prn  aggressive PT

## 2017-01-29 NOTE — PROGRESS NOTE ADULT - SUBJECTIVE AND OBJECTIVE BOX
56F s/p CT-guided drainage of an abdominal abscess. Pt has no complaints at the moment. She remains afebrile. Drain output has been negligible over the last few days.

## 2017-01-29 NOTE — PROGRESS NOTE ADULT - SUBJECTIVE AND OBJECTIVE BOX
O/N: CT A/P w/PO/IV obtained, awaiting read.  1/28: BRAYDEN    STATUS POST:  11/28: Harmony-en-Y reconstruction  12/3: ex lap, abdominal washout, placement of a feeding j tube in common channel distal to uRssel anastomosis, and placement of abdominal drains.    SUBJECTIVE: O/N: CT A/P w/PO/IV obtained, awaiting read.  1/28: BRAYDEN    STATUS POST:  11/28: Harmony-en-Y reconstruction  12/3: ex lap, abdominal washout, placement of a feeding j tube in common channel distal to Russel anastomosis, and placement of abdominal drains.      MEDICATIONS  (STANDING):  ascorbic acid Syrup 500milliGRAM(s) Oral daily  zinc sulfate 220milliGRAM(s) Oral daily  colistimethate IVPB 100milliGRAM(s) IV Intermittent every 12 hours  multivitamin 1Tablet(s) Oral daily  meropenem IVPB 2000milliGRAM(s) IV Intermittent every 8 hours  cyanocobalamin Injectable 1000MICROGram(s) IntraMuscular every 7 days  cholecalciferol 2000Unit(s) Oral daily  collagenase Ointment 1Application(s) Topical daily  thiamine 100milliGRAM(s) Oral daily  vancomycin    Solution 125milliGRAM(s) Oral every 6 hours  escitalopram Solution 20milliGRAM(s) Oral daily  losartan 100milliGRAM(s) Oral daily  nystatin Powder 1Application(s) Topical daily  ferrous    sulfate 60 mG/mL Liquid 300milliGRAM(s) Enteral Tube daily  enoxaparin Injectable 80milliGRAM(s) SubCutaneous <User Schedule>  pantoprazole  Injectable 40milliGRAM(s) IV Push daily  ALBUTerol/ipratropium for Nebulization 3milliLiter(s) Nebulizer every 6 hours    MEDICATIONS  (PRN):  acetaminophen   Tablet. 325milliGRAM(s) Oral every 4 hours PRN Moderate Pain (4 - 6)  diphenhydrAMINE   Injectable 25milliGRAM(s) IV Push every 6 hours PRN Itching  ondansetron    Tablet 4milliGRAM(s) Oral every 8 hours PRN Nausea and/or Vomiting  ondansetron Injectable 4milliGRAM(s) IV Push every 6 hours PRN Nausea and/or Vomiting  acetaminophen    Suspension 650milliGRAM(s) Enteral Tube every 6 hours PRN For Temp greater than 38 C (100.4 F)  oxyCODONE  5 mG/acetaminophen 325 mG 1Tablet(s) Oral every 4 hours PRN Severe Pain (7 - 10)      Vital Signs Last 24 Hrs  T(C): 36.8, Max: 36.8 (01-29 @ 05:50)  T(F): 98.2, Max: 98.2 (01-29 @ 05:50)  HR: 103 (87 - 103)  BP: 155/96 (132/82 - 180/94)  BP(mean): --  RR: 18 (16 - 18)  SpO2: 93% (92% - 96%)    PHYSICAL EXAM:      Constitutional: A&Ox3    Respiratory: non labored breathing, no respiratory distress    Cardiovascular: NSR, RRR    Gastrointestinal:Soft, ND, NT, Wound Vac in place functioning properly, J tube in place                 Incision: Open, Wound vac in place    Extremities: (-) edema                  I&O's Detail    I & Os for current day (as of 29 Jan 2017 08:58)  =============================================  IN:    Oral Fluid: 480 ml    Enteral Tube Flush: 424 ml    Solution: 100 ml    Solution: 100 ml    Total IN: 1104 ml  ---------------------------------------------  OUT:    Voided: 1800 ml    VAC (Vacuum Assisted Closure) System: 25 ml    Total OUT: 1825 ml  ---------------------------------------------  Total NET: -721 ml

## 2017-01-29 NOTE — PROGRESS NOTE ADULT - ASSESSMENT
Discussion was started with the covering team for removal of the drain. Would consider removing the drain tomorrow if the team is amenable to it. Will continue to follow.    Dennis Snell MD  [phone]

## 2017-01-29 NOTE — PROVIDER CONTACT NOTE (OTHER) - DATE AND TIME:
03-Dec-2016 21:30
16-Dec-2016 09:55
16-Dec-2016 16:24
16-Dec-2016 18:00
03-Dec-2016 07:00
07-Dec-2016 00:08
07-Dec-2016 23:00
22-Jan-2017 04:46
29-Jan-2017 10:10
29-Nov-2016 10:00
29-Nov-2016 18:00

## 2017-01-30 NOTE — PROGRESS NOTE ADULT - ASSESSMENT
55 yo F s/p above procedure    c/w abx, vanc, meropenem (end date 1/30), colistin (end date 1/30)  benadryl prn for itching  mechanical soft diet/jtube feeds  incentive spirometer  oob, scds, lovenox  ppi  nausea control prn  pain control prn  aggressive PT  Dispo planning: [facility]

## 2017-01-30 NOTE — PROGRESS NOTE ADULT - SUBJECTIVE AND OBJECTIVE BOX
O/N; BRAYDEN  1/29: BRAYDEN    STATUS POST:    11/28: Harmony-en-Y reconstruction  12/3: ex lap, abdominal washout, placement of a feeding j tube in common channel distal to Russel anastomosis, and placement of abdominal drains.     SUBJECTIVE:  Patient seen and examined at bedside with surgery chief resident on AM rounds. Pt with no complaints at this time. Denies pain, n/v. Tolerating minimal PO intake.     MEDICATIONS  (STANDING):  ascorbic acid Syrup 500milliGRAM(s) Oral daily  zinc sulfate 220milliGRAM(s) Oral daily  colistimethate IVPB 100milliGRAM(s) IV Intermittent every 12 hours  multivitamin 1Tablet(s) Oral daily  meropenem IVPB 2000milliGRAM(s) IV Intermittent every 8 hours  cyanocobalamin Injectable 1000MICROGram(s) IntraMuscular every 7 days  cholecalciferol 2000Unit(s) Oral daily  collagenase Ointment 1Application(s) Topical daily  thiamine 100milliGRAM(s) Oral daily  vancomycin    Solution 125milliGRAM(s) Oral every 6 hours  escitalopram Solution 20milliGRAM(s) Oral daily  losartan 100milliGRAM(s) Oral daily  nystatin Powder 1Application(s) Topical daily  ferrous    sulfate 60 mG/mL Liquid 300milliGRAM(s) Enteral Tube daily  enoxaparin Injectable 80milliGRAM(s) SubCutaneous <User Schedule>  pantoprazole  Injectable 40milliGRAM(s) IV Push daily  ALBUTerol/ipratropium for Nebulization 3milliLiter(s) Nebulizer every 6 hours  diazepam    Tablet 5milliGRAM(s) Oral at bedtime  magnesium sulfate  IVPB 4Gram(s) IV Intermittent once  potassium chloride   Powder 40milliEquivalent(s) Oral once  potassium chloride   Powder 40milliEquivalent(s) Oral once    MEDICATIONS  (PRN):  acetaminophen   Tablet. 325milliGRAM(s) Oral every 4 hours PRN Moderate Pain (4 - 6)  diphenhydrAMINE   Injectable 25milliGRAM(s) IV Push every 6 hours PRN Itching  ondansetron    Tablet 4milliGRAM(s) Oral every 8 hours PRN Nausea and/or Vomiting  ondansetron Injectable 4milliGRAM(s) IV Push every 6 hours PRN Nausea and/or Vomiting  acetaminophen    Suspension 650milliGRAM(s) Enteral Tube every 6 hours PRN For Temp greater than 38 C (100.4 F)  oxyCODONE  5 mG/acetaminophen 325 mG 1Tablet(s) Oral every 4 hours PRN Severe Pain (7 - 10)      Vital Signs Last 24 Hrs  T(C): 37.1, Max: 37.1 (01-30 @ 05:22)  T(F): 98.7, Max: 98.7 (01-30 @ 05:22)  HR: 90 (88 - 107)  BP: 148/92 (142/76 - 174/106)  BP(mean): --  RR: 16 (16 - 19)  SpO2: 94% (94% - 95%)    PHYSICAL EXAM:      Constitutional: A&Ox3    Respiratory: non labored breathing, no respiratory distress    Gastrointestinal: Soft, ND, NT, Wound vac in place, J tube in place with minimal erythema around skin insertion site                 Incision: Wound vac in place, functioning properly    Genitourinary: voids    Extremities: (-) edema                  I&O's Detail    I & Os for current day (as of 30 Jan 2017 08:10)  =============================================  IN:    Enteral Tube Flush: 689 ml    Oral Fluid: 480 ml    Solution: 100 ml    Solution: 50 ml    Total IN: 1319 ml  ---------------------------------------------  OUT:    Voided: 600 ml    VAC (Vacuum Assisted Closure) System: 325 ml    Total OUT: 925 ml  ---------------------------------------------  Total NET: 394 ml      LABS:                        8.5    8.2   )-----------( 271      ( 30 Jan 2017 07:01 )             27.6     30 Jan 2017 07:01    141    |  102    |  18     ----------------------------<  102    3.3     |  30     |  0.46     Ca    11.1       30 Jan 2017 07:01  Phos  2.6       30 Jan 2017 07:01  Mg     1.4       30 Jan 2017 07:01            RADIOLOGY & ADDITIONAL STUDIES:    EXAM:  CT ABDOMEN AND PELVIS OC IC                           PROCEDURE DATE:  01/28/2017    Quantity of Contrast in Vial in ml: 100 Contrast Used: optiray 300  Quantity of Contrast Wasted in ml: 7       INTERPRETATION:  CT scan of the abdomen and pelvis with contrast    Clinical dictation: Abdominal pain for one week patient reports history   of cancer, gastric sleeve surgery, history of diverticulitis    Technique: After administration of oral and intravenous contrast axial   imaging from the lung bases through the pubic symphysis was obtained. The   data was reformatted into sagittal and coronal planes. A prior CT scan   dated 1/12/2017 is retrieved for comparison.    Findings: As on the previous examination there is a left pleural effusion   with adjacent lower lobe consolidation. Consolidation at the right lung   base is present as well. No pericardial effusion is identified.    No focal hepatic lesion is present. The gallbladder is distended without   evidence of radiopaque calculi. No splenic lesion is present. The   pancreas is unremarkable.    There are no adrenal masses. Kidneys enhance symmetrically. There are   parapelvic cysts on the left, unchanged from the prior examination.   Hypoattenuating focus in the left mid kidney is unchanged.    The aorta appears unremarkable. There is no retroperitoneal adenopathy.    The esophagus is distended with fluid on the current examination. There   has been interval removal of the gastrojejunostomy stent. Surgical clips   near the gastric remnant are unchanged. The anterior abdominal wound has   improved. There is a catheter entering the abdominal cavity to the right   of midline, terminating a loop of bowel just deep to the wound. This is   unchanged and may represent a feeding jejunostomy. Extraluminal gas in   the upper abdomen has nearly resolved. There is a small loculated fluid   collection adjacent the left anterolateral abdominal wall, similar to the   prior study. This is best appreciated on series 3 image 64 andmeasures   3.2 x 1.5 x 2.9 cm.    As on the previous examination there is a pigtail catheter in the pelvis   entering from posterior. It is in or adjacent to a peripherally enhancing   collection that has slightly decreased from the prior examination.   Additional smaller peripherally enhancing fluid collections are again   noted without significant increase in size or number.    The rectal tube has been removed. The bladder is incompletely distended.   There is extensive infiltration of the subcutaneous tissues of the left   flank. This may be dependent in etiology.    Dilation of the osseous structures reveals no significant interval   change. There is bridging osteophyte formation throughout the spine and   at the SI joints.    Impression:    Interval removal of the gastrojejunostomy stent and rectal tube    Percutaneous catheters are unchanged.    Bilateral lower lobe consolidation and left pleural effusion    Abdominal and pelvic abscesses similar to the prior examination.            "Thank you for the opportunity to participate in the care of this   patient."    EVETTE NAJERA M.D., RADIOLOGY RESIDENT  This document has been electronically signed.  JULIANA BERGER M.D., ATTENDING RADIOLOGIST  This document has been electronically signed. Jan 29 2017 12:10PM

## 2017-01-30 NOTE — PROGRESS NOTE ADULT - SUBJECTIVE AND OBJECTIVE BOX
56F s/p CT-guided drainage of an abdominal abscess. Pt has no complaints at the moment. She remains afebrile. Drain has had minimal output over past 24h. Called by Surgery Team 4 for bedside removal. Catheter d/c'd. No post procedure complications

## 2017-01-30 NOTE — PROGRESS NOTE ADULT - ASSESSMENT
Status post removal of a left abdominal drainage catheter. No complications.    Antibiotic management per surgery. IR available if needed.

## 2017-01-31 NOTE — PROGRESS NOTE ADULT - SUBJECTIVE AND OBJECTIVE BOX
Ms hogan is much improved and she is ready for rehab  she has been afebrile and tolerating diet but not making best food choices  We will continue j tubes at night to optimiize nutrition and at least 1500 calories per day of tube feeds and cycle at night  we will have her take mutlivitamin and supplement k before she goes  she also needs proton pump inhibitor for ulcer prophylaxis  lovenox 80 mg sq per day  the importance of pt emphasized and she is progresseing  tolerating diet  wbc is fine  only lab of significance is k of 3.3

## 2017-01-31 NOTE — PROGRESS NOTE ADULT - SUBJECTIVE AND OBJECTIVE BOX
STATUS POST:      11/28: Harmony-en-Y reconstruction  12/3: ex lap, abdominal washout, placement of a feeding j tube in common channel distal to Russel anastomosis, and placement of abdominal drains.    SUBJECTIVE:  Patient seen and examined at bedside with surgery chief resident on AM rounds. Pt w/ no complaints at this time.     MEDICATIONS  (STANDING):  ascorbic acid Syrup 500milliGRAM(s) Oral daily  zinc sulfate 220milliGRAM(s) Oral daily  multivitamin 1Tablet(s) Oral daily  cyanocobalamin Injectable 1000MICROGram(s) IntraMuscular every 7 days  cholecalciferol 2000Unit(s) Oral daily  collagenase Ointment 1Application(s) Topical daily  thiamine 100milliGRAM(s) Oral daily  escitalopram Solution 20milliGRAM(s) Oral daily  losartan 100milliGRAM(s) Oral daily  nystatin Powder 1Application(s) Topical daily  ferrous    sulfate 60 mG/mL Liquid 300milliGRAM(s) Enteral Tube daily  enoxaparin Injectable 80milliGRAM(s) SubCutaneous <User Schedule>  pantoprazole  Injectable 40milliGRAM(s) IV Push daily  ALBUTerol/ipratropium for Nebulization 3milliLiter(s) Nebulizer every 6 hours  diazepam    Tablet 5milliGRAM(s) Oral at bedtime    MEDICATIONS  (PRN):  acetaminophen   Tablet. 325milliGRAM(s) Oral every 4 hours PRN Moderate Pain (4 - 6)  diphenhydrAMINE   Injectable 25milliGRAM(s) IV Push every 6 hours PRN Itching  ondansetron    Tablet 4milliGRAM(s) Oral every 8 hours PRN Nausea and/or Vomiting  ondansetron Injectable 4milliGRAM(s) IV Push every 6 hours PRN Nausea and/or Vomiting  acetaminophen    Suspension 650milliGRAM(s) Enteral Tube every 6 hours PRN For Temp greater than 38 C (100.4 F)  oxyCODONE  5 mG/acetaminophen 325 mG 1Tablet(s) Oral every 4 hours PRN Severe Pain (7 - 10)      Vital Signs Last 24 Hrs  T(C): 36.5, Max: 36.9 (01-30 @ 16:44)  T(F): 97.7, Max: 98.4 (01-30 @ 16:44)  HR: 93 (91 - 95)  BP: 161/88 (154/97 - 179/95)  BP(mean): --  RR: 17 (15 - 17)  SpO2: 93% (90% - 95%)    PHYSICAL EXAM:      Constitutional: A&Ox3    Respiratory: non labored breathing, no respiratory distress    Gastrointestinal: Soft, ND, NT, Wound vac in place. Feeding J tube in place                 Incision: Open, Wound vac in place, functioning appropriately    Genitourinary: voids     Extremities: (-) edema                  I&O's Detail    I & Os for current day (as of 31 Jan 2017 07:14)  =============================================  IN:    Osmolite: 636 ml    Oral Fluid: 240 ml    Total IN: 876 ml  ---------------------------------------------  OUT:    Voided: 2250 ml    VAC (Vacuum Assisted Closure) System: 50 ml    Total OUT: 2300 ml  ---------------------------------------------  Total NET: -1424 ml      LABS:                        8.5    8.2   )-----------( 271      ( 30 Jan 2017 07:01 )             27.6     30 Jan 2017 07:01    141    |  102    |  18     ----------------------------<  102    3.3     |  30     |  0.46     Ca    11.1       30 Jan 2017 07:01  Phos  2.6       30 Jan 2017 07:01  Mg     1.4       30 Jan 2017 07:01            RADIOLOGY & ADDITIONAL STUDIES:    EXAM:  CT ABDOMEN AND PELVIS OC IC                           PROCEDURE DATE:  01/28/2017    Quantity of Contrast in Vial in ml: 100 Contrast Used: optiray 300  Quantity of Contrast Wasted in ml: 7       INTERPRETATION:  CT scan of the abdomen and pelvis with contrast    Clinical dictation: Abdominal pain for one week patient reports history   of cancer, gastric sleeve surgery, history of diverticulitis    Technique: After administration of oral and intravenous contrast axial   imaging from the lung bases through the pubic symphysis was obtained. The   data was reformatted into sagittal and coronal planes. A prior CT scan   dated 1/12/2017 is retrieved for comparison.    Findings: As on the previous examination there is a left pleural effusion   with adjacent lower lobe consolidation. Consolidation at the right lung   base is present as well. No pericardial effusion is identified.    No focal hepatic lesion is present. The gallbladder is distended without   evidence of radiopaque calculi. No splenic lesion is present. The   pancreas is unremarkable.    There are no adrenal masses. Kidneys enhance symmetrically. There are   parapelvic cysts on the left, unchanged from the prior examination.   Hypoattenuating focus in the left mid kidney is unchanged.    The aorta appears unremarkable. There is no retroperitoneal adenopathy.    The esophagus is distended with fluid on the current examination. There   has been interval removal of the gastrojejunostomy stent. Surgical clips   near the gastric remnant are unchanged. The anterior abdominal wound has   improved. There is a catheter entering the abdominal cavity to the right   of midline, terminating a loop of bowel just deep to the wound. This is   unchanged and may represent a feeding jejunostomy. Extraluminal gas in   the upper abdomen has nearly resolved. There is a small loculated fluid   collection adjacent the left anterolateral abdominal wall, similar to the   prior study. This is best appreciated on series 3 image 64 andmeasures   3.2 x 1.5 x 2.9 cm.    As on the previous examination there is a pigtail catheter in the pelvis   entering from posterior. It is in or adjacent to a peripherally enhancing   collection that has slightly decreased from the prior examination.   Additional smaller peripherally enhancing fluid collections are again   noted without significant increase in size or number.    The rectal tube has been removed. The bladder is incompletely distended.   There is extensive infiltration of the subcutaneous tissues of the left   flank. This may be dependent in etiology.    Dilation of the osseous structures reveals no significant interval   change. There is bridging osteophyte formation throughout the spine and   at the SI joints.    Impression:    Interval removal of the gastrojejunostomy stent and rectal tube    Percutaneous catheters are unchanged.    Bilateral lower lobe consolidation and left pleural effusion    Abdominal and pelvic abscesses similar to the prior examination.            "Thank you for the opportunity to participate in the care of this   patient."    EVETTE NAJERA M.D., RADIOLOGY RESIDENT  This document has been electronically signed.  JULIANA BERGER M.D., ATTENDING RADIOLOGIST  This document has been electronically signed. Jan 29 2017 12:10PM

## 2017-01-31 NOTE — PROGRESS NOTE ADULT - ASSESSMENT
57 yo F s/p above procedure    c/w abx, vanc, meropenem (end date 1/30), colistin (end date 1/30)  benadryl prn for itching  mechanical soft diet/jtube feeds  incentive spirometer  oob, scds, lovenox  ppi  nausea control prn  pain control prn  aggressive PT  Dispo planning: [facility]

## 2017-01-31 NOTE — PROGRESS NOTE ADULT - SUBJECTIVE AND OBJECTIVE BOX
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 Alert, oriented and stable. Deconditioned. Uppers; 3+-4-/5. Lower extremities; 3--3/5    Bed Mobility:    Transfers:Mod. assistance of 2 to stand.Poor balace.    Ambulation:needs mod. assitance of 2 with rolling walker, poor endurance.      Impression:Deconditioned2. s/p gastrectomy/obesity.      Recommendations:1. Continue bed side PT as tolerated for therapeutic ex. and gait training with walker and assistance. 2. Subacute rehab. placement.

## 2017-01-31 NOTE — PROGRESS NOTE ADULT - I WAS PHYSICALLY PRESENT FOR THE KEY PORTIONS OF THE EVALUATION AND MANAGEMENT (E/M) SERVICE PROVIDED.  I AGREE WITH THE ABOVE HISTORY, PHYSICAL, AND PLAN WHICH I HAVE REVIEWED AND EDITED WHERE APPROPRIATE

## 2017-02-01 NOTE — PROGRESS NOTE ADULT - SUBJECTIVE AND OBJECTIVE BOX
24 HOURS EVENTS: hydralazine 10mg x 1 given  1/31: Accepted at [facility].     Surgery:11/28: Harmony-en-Y reconstruction  12/3: ex lap, abdominal washout, placement of a feeding j tube in common channel distal to Russel anastomosis, and placement of abdominal drains.  Abdominal wound left open with wound vac in place.  Intra-op, patient found to have leak of esophagojejunostomy which they could not repair due to its location.    SUBJECTIVE: Patient denies any complaints  Flatus: [X] YES [] NO             Bowel Movement: [X ] YES [ ] NO  Pain (0-10):            Pain Control Adequate: [X ] YES [ ] NO  Nausea: [ ] YES [X] NO            Vomiting: [ ] YES [X ] NO  Diarrhea: [ ] YES [X ] NO         Constipation: [ ] YES [X ] NO     Chest Pain: [ ] YES [X ] NO    SOB:  [ ] YES [X ] NO    losartan 100milliGRAM(s) Oral daily  enoxaparin Injectable 80milliGRAM(s) SubCutaneous <User Schedule>      Vital Signs Last 24 Hrs  T(C): 35.8, Max: 36.7 (01-31 @ 09:00)  T(F): 96.4, Max: 98 (01-31 @ 09:00)  HR: 93 (93 - 106)  BP: 175/109 (151/93 - 186/108)  BP(mean): --  RR: 16 (16 - 16)  SpO2: 94% (91% - 95%)  I&O's Detail    I & Os for current day (as of 01 Feb 2017 08:00)  =============================================  IN:    Oral Fluid: 560 ml    Total IN: 560 ml  ---------------------------------------------  OUT:    Voided: 900 ml    Total OUT: 900 ml  ---------------------------------------------  Total NET: -340 ml      General: NAD, resting comfortably in bed  C/V: NSR  Pulm: Nonlabored breathing, no respiratory distress  Abd: soft, non distended, wound vac functioning   Extrem: WWP, no edema, SCDs in place      LABS:                RADIOLOGY & ADDITIONAL STUDIES:

## 2017-02-01 NOTE — PROVIDER CONTACT NOTE (OTHER) - ACTION/TREATMENT ORDERED:
per MD will give another 1L bolus NS and will not attempt sedation vacation per patient post op and unstable
per provider is next hour urine output is <150, then will give IV Lasix
Due for Losartan 100 mg PO, administer and recheck
will continue to monitor
will probably be given lasix tonight
Per MD restart tube feedings via J-tube and monitor for increased output. Per MD feedings were started initially via NGT, so CHECO drainage is probably residual from earlier per patient has GI leak
collect abg now
per MD will repeat CBC in morning (530am) per patient was fluid resuscitated yesterday with 9L fluid, H&H dilutional
will continue Chest P.T. bronch again tomorrow

## 2017-02-01 NOTE — PROVIDER CONTACT NOTE (OTHER) - SITUATION
10cc u/o at 4pm
ambulance came to  patient for transport to [facility] ,BP elevated 178/107, HR92. pt was asymptomatic. Discharge delayed for now until BP at desired limit
Pt received 2 percocet at 4pm for pain 10/10. Pt at this time was refusing 1 percocet and stated "that she takes percocet at home, so I knows how I react."
Pt requiring 4L of 02 via NC to maintain oxygen saturation at 92-93% while laying down. When sitting up, pt 95% on 4L of 02. Pt in no respiratory distress.
Taken temperature rectally -101.4
low u/o
pt's /106, HR 99, RR17, temp 97.4, O2 sat 94% on 3L via NC. pt was asymptomatic, denies chest pain, denies headache, denies feeling of palpitation

## 2017-02-01 NOTE — PROVIDER CONTACT NOTE (OTHER) - RECOMMENDATIONS
DANNI Sierra to administer Hydralazine 10mg IVP. IV access 22gauge inserted at Left AC with very good blood return
BMP to collect first before giving IBF
continue to monitor per team MD, no ordered medication for intervention
hold tube feedings via J-Tube
repeat CBC for 10pm
none at this time. Patient had bronchoscopy by Md Harper * blood cx taken yesterday as per Md Hirsch

## 2017-02-01 NOTE — PROVIDER CONTACT NOTE (OTHER) - NAME OF MD/NP/PA/DO NOTIFIED:
sheryl Rodríguez
Crystal RAZO
Komal Stephen MD
MD Komal Stephen
MD Uriel
Rigo, PA
Vishal Baum
sheryl ferguson
MD Hirsch
Md Hirsch
sanchez BARRIOS

## 2017-02-01 NOTE — PROGRESS NOTE ADULT - PROVIDER SPECIALTY LIST ADULT
Bariatric Surgery
Gastroenterology
Infectious Disease
Intervent Radiology
Nephrology
Pulmonology
Radiology
Rehab Medicine
SICU
Surgery
Intervent Radiology
Nephrology
Pulmonology
SICU
Gastroenterology
Surgery
Pulmonology
Pulmonology

## 2017-02-01 NOTE — PROVIDER CONTACT NOTE (OTHER) - REASON
High BP
Pt on 4L of 02 sating at 93%.
Pt sating 91-92%on 4L of 02 via NC while sitting on side of bed.
delayed discharge due to elevated BP
elevated BP
low urine output
urine output less than 150ml for hour
fever
low u/o as low as 10cc
tube feeding drainage out to Bilateral Jps
urine output
drop in H&H

## 2017-02-15 ENCOUNTER — INPATIENT (INPATIENT)
Facility: HOSPITAL | Age: 57
LOS: 25 days | Discharge: HOME | DRG: 393 | End: 2017-03-13
Attending: SURGERY | Admitting: SURGERY
Payer: COMMERCIAL

## 2017-02-15 ENCOUNTER — APPOINTMENT (OUTPATIENT)
Dept: BARIATRICS | Facility: CLINIC | Age: 57
End: 2017-02-15

## 2017-02-15 VITALS
TEMPERATURE: 98 F | OXYGEN SATURATION: 97 % | DIASTOLIC BLOOD PRESSURE: 66 MMHG | HEIGHT: 63 IN | HEART RATE: 93 BPM | RESPIRATION RATE: 16 BRPM | SYSTOLIC BLOOD PRESSURE: 87 MMHG | WEIGHT: 162.04 LBS

## 2017-02-15 VITALS
HEART RATE: 98 BPM | TEMPERATURE: 97.4 F | DIASTOLIC BLOOD PRESSURE: 74 MMHG | OXYGEN SATURATION: 94 % | SYSTOLIC BLOOD PRESSURE: 80 MMHG

## 2017-02-15 DIAGNOSIS — Z98.84 BARIATRIC SURGERY STATUS: Chronic | ICD-10-CM

## 2017-02-15 DIAGNOSIS — K95.89 OTHER COMPLICATIONS OF OTHER BARIATRIC PROCEDURE: ICD-10-CM

## 2017-02-15 LAB
ALBUMIN SERPL ELPH-MCNC: 2.3 G/DL — LOW (ref 3.4–5)
ALP SERPL-CCNC: 295 U/L — HIGH (ref 40–120)
ALT FLD-CCNC: 100 U/L — HIGH (ref 12–42)
ANION GAP SERPL CALC-SCNC: 8 MMOL/L — LOW (ref 9–16)
APPEARANCE UR: CLEAR — SIGNIFICANT CHANGE UP
APTT BLD: 24.9 SEC — LOW (ref 27.5–37.4)
AST SERPL-CCNC: 50 U/L — HIGH (ref 15–37)
BILIRUB SERPL-MCNC: 0.4 MG/DL — SIGNIFICANT CHANGE UP (ref 0.2–1.2)
BILIRUB UR-MCNC: NEGATIVE — SIGNIFICANT CHANGE UP
BUN SERPL-MCNC: 39 MG/DL — HIGH (ref 7–23)
CALCIUM SERPL-MCNC: 9.1 MG/DL — SIGNIFICANT CHANGE UP (ref 8.5–10.5)
CHLORIDE SERPL-SCNC: 100 MMOL/L — SIGNIFICANT CHANGE UP (ref 96–108)
CO2 SERPL-SCNC: 28 MMOL/L — SIGNIFICANT CHANGE UP (ref 22–31)
COLOR SPEC: YELLOW — SIGNIFICANT CHANGE UP
CREAT SERPL-MCNC: 1.02 MG/DL — SIGNIFICANT CHANGE UP (ref 0.5–1.3)
DIFF PNL FLD: NEGATIVE — SIGNIFICANT CHANGE UP
GLUCOSE SERPL-MCNC: 101 MG/DL — HIGH (ref 70–99)
GLUCOSE UR QL: NEGATIVE — SIGNIFICANT CHANGE UP
HCT VFR BLD CALC: 23.8 % — LOW (ref 34.5–45)
HGB BLD-MCNC: 7.4 G/DL — LOW (ref 11.5–15.5)
INR BLD: 1.32 — HIGH (ref 0.88–1.16)
KETONES UR-MCNC: NEGATIVE — SIGNIFICANT CHANGE UP
LEUKOCYTE ESTERASE UR-ACNC: NEGATIVE — SIGNIFICANT CHANGE UP
MCHC RBC-ENTMCNC: 27.9 PG — SIGNIFICANT CHANGE UP (ref 27–34)
MCHC RBC-ENTMCNC: 31.1 G/DL — LOW (ref 32–36)
MCV RBC AUTO: 89.8 FL — SIGNIFICANT CHANGE UP (ref 80–100)
NITRITE UR-MCNC: NEGATIVE — SIGNIFICANT CHANGE UP
PH UR: 6 — SIGNIFICANT CHANGE UP (ref 4–8)
PLATELET # BLD AUTO: 338 K/UL — SIGNIFICANT CHANGE UP (ref 150–400)
POTASSIUM SERPL-MCNC: 5.6 MMOL/L — HIGH (ref 3.5–5.3)
POTASSIUM SERPL-SCNC: 5.6 MMOL/L — HIGH (ref 3.5–5.3)
PROT SERPL-MCNC: 7.5 G/DL — SIGNIFICANT CHANGE UP (ref 6.4–8.2)
PROT UR-MCNC: NEGATIVE MG/DL — SIGNIFICANT CHANGE UP
PROTHROM AB SERPL-ACNC: 14.7 SEC — HIGH (ref 10–13.1)
RBC # BLD: 2.65 M/UL — LOW (ref 3.8–5.2)
RBC # FLD: 17.9 % — HIGH (ref 10.3–16.9)
SODIUM SERPL-SCNC: 136 MMOL/L — SIGNIFICANT CHANGE UP (ref 135–145)
SP GR SPEC: <=1.005 — SIGNIFICANT CHANGE UP (ref 1–1.03)
UROBILINOGEN FLD QL: 0.2 E.U./DL — SIGNIFICANT CHANGE UP
WBC # BLD: 12.2 K/UL — HIGH (ref 3.8–10.5)
WBC # FLD AUTO: 12.2 K/UL — HIGH (ref 3.8–10.5)

## 2017-02-15 PROCEDURE — 74177 CT ABD & PELVIS W/CONTRAST: CPT | Mod: 26

## 2017-02-15 PROCEDURE — 93010 ELECTROCARDIOGRAM REPORT: CPT

## 2017-02-15 PROCEDURE — 99285 EMERGENCY DEPT VISIT HI MDM: CPT | Mod: 25

## 2017-02-15 PROCEDURE — 77001 FLUOROGUIDE FOR VEIN DEVICE: CPT | Mod: 26

## 2017-02-15 PROCEDURE — 36569 INSJ PICC 5 YR+ W/O IMAGING: CPT | Mod: 59

## 2017-02-15 PROCEDURE — 93010 ELECTROCARDIOGRAM REPORT: CPT | Mod: 77

## 2017-02-15 PROCEDURE — 76937 US GUIDE VASCULAR ACCESS: CPT | Mod: 26

## 2017-02-15 RX ORDER — IOHEXOL 300 MG/ML
50 INJECTION, SOLUTION INTRAVENOUS ONCE
Qty: 0 | Refills: 0 | Status: COMPLETED | OUTPATIENT
Start: 2017-02-15 | End: 2017-02-15

## 2017-02-15 RX ORDER — PIPERACILLIN AND TAZOBACTAM 4; .5 G/20ML; G/20ML
3.38 INJECTION, POWDER, LYOPHILIZED, FOR SOLUTION INTRAVENOUS EVERY 8 HOURS
Qty: 0 | Refills: 0 | Status: DISCONTINUED | OUTPATIENT
Start: 2017-02-15 | End: 2017-02-15

## 2017-02-15 RX ORDER — HEPARIN SODIUM 5000 [USP'U]/ML
5000 INJECTION INTRAVENOUS; SUBCUTANEOUS EVERY 8 HOURS
Qty: 0 | Refills: 0 | Status: DISCONTINUED | OUTPATIENT
Start: 2017-02-15 | End: 2017-02-15

## 2017-02-15 RX ORDER — DIPHENHYDRAMINE HCL 50 MG
25 CAPSULE ORAL AT BEDTIME
Qty: 0 | Refills: 0 | Status: DISCONTINUED | OUTPATIENT
Start: 2017-02-16 | End: 2017-02-23

## 2017-02-15 RX ORDER — FERROUS SULFATE 325(65) MG
325 TABLET ORAL DAILY
Qty: 0 | Refills: 0 | Status: DISCONTINUED | OUTPATIENT
Start: 2017-02-16 | End: 2017-03-13

## 2017-02-15 RX ORDER — ZINC SULFATE TAB 220 MG (50 MG ZINC EQUIVALENT) 220 (50 ZN) MG
220 TAB ORAL DAILY
Qty: 0 | Refills: 0 | Status: DISCONTINUED | OUTPATIENT
Start: 2017-02-16 | End: 2017-02-17

## 2017-02-15 RX ORDER — DIPHENHYDRAMINE HCL 50 MG
25 CAPSULE ORAL ONCE
Qty: 0 | Refills: 0 | Status: COMPLETED | OUTPATIENT
Start: 2017-02-15 | End: 2017-02-15

## 2017-02-15 RX ORDER — PREGABALIN 225 MG/1
250 CAPSULE ORAL DAILY
Qty: 0 | Refills: 0 | Status: DISCONTINUED | OUTPATIENT
Start: 2017-02-16 | End: 2017-03-13

## 2017-02-15 RX ORDER — ENOXAPARIN SODIUM 100 MG/ML
80 INJECTION SUBCUTANEOUS DAILY
Qty: 0 | Refills: 0 | Status: DISCONTINUED | OUTPATIENT
Start: 2017-02-15 | End: 2017-02-16

## 2017-02-15 RX ORDER — CHOLECALCIFEROL (VITAMIN D3) 125 MCG
400 CAPSULE ORAL DAILY
Qty: 0 | Refills: 0 | Status: DISCONTINUED | OUTPATIENT
Start: 2017-02-16 | End: 2017-03-13

## 2017-02-15 RX ORDER — ACETAMINOPHEN 500 MG
650 TABLET ORAL ONCE
Qty: 0 | Refills: 0 | Status: COMPLETED | OUTPATIENT
Start: 2017-02-15 | End: 2017-02-15

## 2017-02-15 RX ORDER — ACETAMINOPHEN 500 MG
325 TABLET ORAL EVERY 4 HOURS
Qty: 0 | Refills: 0 | Status: DISCONTINUED | OUTPATIENT
Start: 2017-02-15 | End: 2017-03-05

## 2017-02-15 RX ORDER — ESCITALOPRAM OXALATE 10 MG/1
20 TABLET, FILM COATED ORAL DAILY
Qty: 0 | Refills: 0 | Status: DISCONTINUED | OUTPATIENT
Start: 2017-02-16 | End: 2017-03-13

## 2017-02-15 RX ORDER — PIPERACILLIN AND TAZOBACTAM 4; .5 G/20ML; G/20ML
3.38 INJECTION, POWDER, LYOPHILIZED, FOR SOLUTION INTRAVENOUS ONCE
Qty: 0 | Refills: 0 | Status: COMPLETED | OUTPATIENT
Start: 2017-02-15 | End: 2017-02-15

## 2017-02-15 RX ORDER — PANTOPRAZOLE SODIUM 20 MG/1
40 TABLET, DELAYED RELEASE ORAL DAILY
Qty: 0 | Refills: 0 | Status: DISCONTINUED | OUTPATIENT
Start: 2017-02-16 | End: 2017-02-27

## 2017-02-15 RX ORDER — PIPERACILLIN AND TAZOBACTAM 4; .5 G/20ML; G/20ML
3.38 INJECTION, POWDER, LYOPHILIZED, FOR SOLUTION INTRAVENOUS ONCE
Qty: 0 | Refills: 0 | Status: DISCONTINUED | OUTPATIENT
Start: 2017-02-16 | End: 2017-02-15

## 2017-02-15 RX ORDER — SODIUM CHLORIDE 9 MG/ML
1000 INJECTION INTRAMUSCULAR; INTRAVENOUS; SUBCUTANEOUS ONCE
Qty: 0 | Refills: 0 | Status: COMPLETED | OUTPATIENT
Start: 2017-02-15 | End: 2017-02-15

## 2017-02-15 RX ORDER — DIAZEPAM 5 MG
10 TABLET ORAL AT BEDTIME
Qty: 0 | Refills: 0 | Status: DISCONTINUED | OUTPATIENT
Start: 2017-02-15 | End: 2017-02-22

## 2017-02-15 RX ORDER — ALBUTEROL 90 UG/1
1 AEROSOL, METERED ORAL EVERY 4 HOURS
Qty: 0 | Refills: 0 | Status: DISCONTINUED | OUTPATIENT
Start: 2017-02-15 | End: 2017-03-13

## 2017-02-15 RX ORDER — THIAMINE MONONITRATE (VIT B1) 100 MG
100 TABLET ORAL DAILY
Qty: 0 | Refills: 0 | Status: DISCONTINUED | OUTPATIENT
Start: 2017-02-16 | End: 2017-02-17

## 2017-02-15 RX ORDER — VANCOMYCIN HCL 1 G
VIAL (EA) INTRAVENOUS
Qty: 0 | Refills: 0 | Status: DISCONTINUED | OUTPATIENT
Start: 2017-02-16 | End: 2017-02-16

## 2017-02-15 RX ORDER — ALBUTEROL 90 UG/1
2.5 AEROSOL, METERED ORAL EVERY 6 HOURS
Qty: 0 | Refills: 0 | Status: DISCONTINUED | OUTPATIENT
Start: 2017-02-15 | End: 2017-03-13

## 2017-02-15 RX ORDER — ONDANSETRON 8 MG/1
4 TABLET, FILM COATED ORAL EVERY 6 HOURS
Qty: 0 | Refills: 0 | Status: DISCONTINUED | OUTPATIENT
Start: 2017-02-15 | End: 2017-03-13

## 2017-02-15 RX ORDER — SODIUM CHLORIDE 9 MG/ML
1000 INJECTION, SOLUTION INTRAVENOUS
Qty: 0 | Refills: 0 | Status: DISCONTINUED | OUTPATIENT
Start: 2017-02-15 | End: 2017-02-16

## 2017-02-15 RX ORDER — LOSARTAN POTASSIUM 100 MG/1
100 TABLET, FILM COATED ORAL DAILY
Qty: 0 | Refills: 0 | Status: DISCONTINUED | OUTPATIENT
Start: 2017-02-16 | End: 2017-03-13

## 2017-02-15 RX ORDER — HYDROMORPHONE HYDROCHLORIDE 2 MG/ML
0.2 INJECTION INTRAMUSCULAR; INTRAVENOUS; SUBCUTANEOUS EVERY 4 HOURS
Qty: 0 | Refills: 0 | Status: DISCONTINUED | OUTPATIENT
Start: 2017-02-15 | End: 2017-02-16

## 2017-02-15 RX ORDER — OXYBUTYNIN CHLORIDE 5 MG
5 TABLET ORAL
Qty: 0 | Refills: 0 | Status: DISCONTINUED | OUTPATIENT
Start: 2017-02-15 | End: 2017-03-13

## 2017-02-15 RX ADMIN — PIPERACILLIN AND TAZOBACTAM 200 GRAM(S): 4; .5 INJECTION, POWDER, LYOPHILIZED, FOR SOLUTION INTRAVENOUS at 18:50

## 2017-02-15 RX ADMIN — Medication 650 MILLIGRAM(S): at 17:29

## 2017-02-15 RX ADMIN — SODIUM CHLORIDE 1 MILLILITER(S): 9 INJECTION INTRAMUSCULAR; INTRAVENOUS; SUBCUTANEOUS at 18:46

## 2017-02-15 RX ADMIN — Medication 25 MILLIGRAM(S): at 22:32

## 2017-02-15 RX ADMIN — IOHEXOL 50 MILLILITER(S): 300 INJECTION, SOLUTION INTRAVENOUS at 17:29

## 2017-02-15 RX ADMIN — SODIUM CHLORIDE 60.06 MILLILITER(S): 9 INJECTION INTRAMUSCULAR; INTRAVENOUS; SUBCUTANEOUS at 17:16

## 2017-02-15 NOTE — ED PROVIDER NOTE - ABDOMINAL TENDER
open abdominal wound with WTD dressing in place. J tube in place. open RUQ incision w. packing in place, minimal serosanguinous drainage/right upper quadrant

## 2017-02-15 NOTE — ED ADULT NURSE NOTE - OBJECTIVE STATEMENT
Patient presents to ED with c/o hypotension and weakness. Patient reports fever 1 week ago, denies any fever since then. Patient seen by MD today and as per MD patient has an infection. Patient noted to have feeding tube. Multiple open wounds noted to abdomen with drainage. Patient noted to have 2 ulcers to right buttock. Patient A&Ox3. Placed on cardia monitor. Safety precautions maintained. Will continue to monitor.

## 2017-02-15 NOTE — ED PROVIDER NOTE - OBJECTIVE STATEMENT
56F w/ pmh of HTN, gastric bypass in 2002, and recent protracted course at [facility] for revision of gastric bypass. Patient was discharged from [facility] approx 2 weeks ago to rehab facility after 70 day course at [facility] following a complicated revision procedure. Pt was seen in the office by Dr. Brady today and found to have an abdominal abscess which was explored and packed in the office. Patient was subsequently sent to ER for CT scan of abdomen and further medical evaluation.   Pt reports she is doing well at rehab. Denies CP/Sob/F/c. Reports continued bowel function. 56F w/ pmh of HTN, gastric bypass in 2002, and recent protracted course at [facility] for revision of gastric bypass. Patient was discharged from [facility] approx 2 weeks ago to rehab facility after 70 day course at [facility] following a complicated revision procedure. Pt was seen in the office by Dr. Brady today and found to have an abdominal abscess which was explored and packed in the office. Patient was subsequently sent to ER for CT scan of abdomen and further medical evaluation.   Pt reports she is doing well at rehab. Denies CP/Sob/F/c. Reports continued bowel function.  Reports she had a fever of 101.4 on Saturday.

## 2017-02-15 NOTE — H&P ADULT. - RADIOLOGY RESULTS AND INTERPRETATION
EXAM:  CT ABDOMEN AND PELVIS OC IC ///  PROCEDURE DATE:  02/15/2017    IMPRESSION:  1.  9.8 x 4.3 x 1.9 cm collection in the left abdominal wall, likely   intraperitoneal which appears to be a developing abscess. 2.5 cm pocket   of extraluminal air adjacent to this collection.  2.  Slightly decreased pelvic collection measuring 3.8 x 1.7 cm.

## 2017-02-15 NOTE — H&P ADULT. - ASSESSMENT
56F w/PMHx of HTN, gastric bypass in 2002 c/b stricture, corkscrewed sleeve and chronic leak, recently revised on 11/28/16 with protracted (70-day) postoperative course complicated by MDR infections, respiratory compromise 2/2 plugging/PNA/effusions requiring multiple interventions and a trach, as well as a continued leak requiring a draining double-pigtail with stenting performed by GI, who presents today after recurrent fevers likely 2/2 abscess formation in LLQ.    - Admit to team 2 surgery (Dr. Brady)  - NPO, IVF  - Will increase TFs  - Will place wound vac  - 1 unit pRBCs for hgb of 7.4  - IV Zosyn  - IR consult for drainage vs subcutaneous I&D of abscess 56F w/PMHx of HTN, gastric bypass in 2002 c/b stricture, corkscrewed sleeve and chronic leak, recently revised on 11/28/16 with protracted (70-day) postoperative course complicated by MDR infections, respiratory compromise 2/2 plugging/PNA/effusions requiring multiple interventions and a trach, as well as a continued leak requiring a draining double-pigtail with stenting performed by GI, who presents today after recurrent fevers likely 2/2 abscess formation in LLQ.    - Admit to team 2 surgery (Dr. Brady)  - NPO, IVF  - Will increase TFs (Osmolite 1.2/6pm to 6am)  - Will place wound vac  - 1 unit pRBCs for hgb of 7.4  - IV Vancomycin due to h/o wound infection with MDR Pseudomonas (resistant to Zosyn)  - Continue home medications  - IR consult for drainage vs subcutaneous I&D of abscess

## 2017-02-15 NOTE — ED PROVIDER NOTE - MEDICAL DECISION MAKING DETAILS
56F recently discharged from [facility] following a complicated course for a surgical revision of her gastric bypass, re-presents to ED from surgeon's office w/ new abdominal abscess   - Basic labs to eval infx   - CT A/P to eval extent of abscess

## 2017-02-15 NOTE — H&P ADULT. - GASTROINTESTINAL COMMENTS
freshly packed I&D site just superior to her J tube in her R hypogastrium, there is a draining prior drain site LLQ with a large area of erythematous induration with a small bulla at its center just lateral to prior drain site.  This bulla expresses opaque white drainage when pressed.

## 2017-02-15 NOTE — ED PROVIDER NOTE - PROGRESS NOTE DETAILS
57 yo hx of HTN, gastric bypass in 2002 s/p revision of aamir en y w/ open abd wound on discharge. Found to have abdominal abscess in office and packed and referred to ED for CT scan. 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

## 2017-02-15 NOTE — ED PROVIDER NOTE - ATTENDING CONTRIBUTION TO CARE
57 yo hx of HTN, gastric bypass in 2002 s/p revision of aamir en y w/ open abd wound on discharge. Found to have abdominal abscess in office and packed and referred to ED for definitive CT scan. ? hx of food leakage through fistula. CT scan obtained with large abscess, surgery consulted, given abx in ED, to admit for likely further intevention.

## 2017-02-15 NOTE — H&P ADULT. - HISTORY OF PRESENT ILLNESS
56F w/PMHx of HTN, gastric bypass in 2002 c/b stricture, corkscrewed sleeve and chronic leak, recently revised on 11/28/16 with protracted (70-day) postoperative course complicated by MDR infections, respiratory compromise 2/2 plugging/PNA/effusions requiring multiple interventions and a trach, as well as a continued leak requiring a draining double-pigtail with stenting performed by GI.  Patient was discharged to rehab roughly 2 weeks ago and has been doing relatively well except for a recent development of fevers, 101.4 five days ago.  Consequently, patient was seen by Dr. Brady in his office today where she had a RUQ abscess just superior to her J-tube incised and drained.  However, patient was also found to be hypotensive and was sent to [facility] ED for resuscitation and further imaging.  On arrival, patient is afebrile (T 97.9) but hypotensive with SBPs of low 80s.  Given 2L of crystalloid. CT A/P reveals large LLQ intraperitoneal abscess formation/collection with associated subcutaneous air pocket.  On exam, patient states that she has abdominal pain but she is nontender on exam.  There is a freshly packed I&D site just superior to her J tube in her R hypogastrium, there is a draining prior drain site LLQ with a large area of erythematous induration with a small bulla at its center just lateral to prior drain site.  This bulla expresses ?purulent drainage vs TFdrainage when pressed.   Patient denies nausea/vomiting/chills/diarrhea/constipation/sob/cp.  Endorses incontinence. 56F w/PMHx of HTN, gastric bypass in 2002 c/b stricture, corkscrewed sleeve and chronic leak, recently revised on 11/28/16 with protracted (70-day) postoperative course complicated by MDR infections, a C-diff infection, respiratory compromise 2/2 plugging/PNA/effusions requiring multiple interventions and a trach, as well as a continued leak requiring a draining double-pigtail with stenting performed by GI.  Patient was discharged to rehab roughly 2 weeks ago and has been doing relatively well except for a recent development of fevers, 101.4 five days ago.  Consequently, patient was seen by Dr. Brady in his office today where she had a RUQ abscess just superior to her J-tube incised and drained.  However, patient was also found to be hypotensive and was sent to [facility] ED for resuscitation and further imaging.  On arrival, patient is afebrile (T 97.9) but hypotensive with SBPs of low 80s.  Given 2L of crystalloid. CT A/P reveals large LLQ intraperitoneal abscess formation/collection with associated subcutaneous air pocket.  On exam, patient states that she has abdominal pain but she is nontender on exam.  There is a freshly packed I&D site just superior to her J tube in her R hypogastrium, there is a draining prior drain site LLQ with a large area of erythematous induration with a small bulla at its center just lateral to prior drain site.  This bulla expresses ?purulent drainage vs TFdrainage when pressed.   Patient denies nausea/vomiting/chills/diarrhea/constipation/sob/cp.  Endorses incontinence.

## 2017-02-15 NOTE — ED ADULT TRIAGE NOTE - CHIEF COMPLAINT QUOTE
pt had recent revisional surgery to her abdomen, pt has large wound with wound vac to abdomen. pt sent in from doctors office for a CT of Abdomen, because as per rehab center the food as becoming out of her wound.

## 2017-02-15 NOTE — ED PROCEDURE NOTE - CPROC ED URIN DEVICE DETAIL1
Using strict sterile technique, a non-indwelling urinary device was inserted through the urethral meatus and into the bladder (see size above).

## 2017-02-15 NOTE — ED ADULT NURSE REASSESSMENT NOTE - NS ED NURSE REASSESS COMMENT FT1
Patient has large dressing on abdomen at surgical site placed by Dr. Julio. Dressing clean, dry and intact.

## 2017-02-16 LAB
ANION GAP SERPL CALC-SCNC: 7 MMOL/L — LOW (ref 9–16)
BLD GP AB SCN SERPL QL: NEGATIVE — SIGNIFICANT CHANGE UP
BLD GP AB SCN SERPL QL: NEGATIVE — SIGNIFICANT CHANGE UP
BUN SERPL-MCNC: 36 MG/DL — HIGH (ref 7–23)
CALCIUM SERPL-MCNC: 8.7 MG/DL — SIGNIFICANT CHANGE UP (ref 8.5–10.5)
CHLORIDE SERPL-SCNC: 105 MMOL/L — SIGNIFICANT CHANGE UP (ref 96–108)
CO2 SERPL-SCNC: 28 MMOL/L — SIGNIFICANT CHANGE UP (ref 22–31)
CREAT SERPL-MCNC: 0.95 MG/DL — SIGNIFICANT CHANGE UP (ref 0.5–1.3)
GLUCOSE SERPL-MCNC: 86 MG/DL — SIGNIFICANT CHANGE UP (ref 70–99)
POTASSIUM SERPL-MCNC: 4.9 MMOL/L — SIGNIFICANT CHANGE UP (ref 3.5–5.3)
POTASSIUM SERPL-SCNC: 4.9 MMOL/L — SIGNIFICANT CHANGE UP (ref 3.5–5.3)
RH IG SCN BLD-IMP: POSITIVE — SIGNIFICANT CHANGE UP
RH IG SCN BLD-IMP: POSITIVE — SIGNIFICANT CHANGE UP
SODIUM SERPL-SCNC: 140 MMOL/L — SIGNIFICANT CHANGE UP (ref 135–145)

## 2017-02-16 RX ORDER — SODIUM CHLORIDE 9 MG/ML
1000 INJECTION INTRAMUSCULAR; INTRAVENOUS; SUBCUTANEOUS ONCE
Qty: 0 | Refills: 0 | Status: DISCONTINUED | OUTPATIENT
Start: 2017-02-16 | End: 2017-02-19

## 2017-02-16 RX ORDER — VANCOMYCIN HCL 1 G
1000 VIAL (EA) INTRAVENOUS EVERY 12 HOURS
Qty: 0 | Refills: 0 | Status: DISCONTINUED | OUTPATIENT
Start: 2017-02-16 | End: 2017-02-16

## 2017-02-16 RX ORDER — VANCOMYCIN HCL 1 G
1000 VIAL (EA) INTRAVENOUS ONCE
Qty: 0 | Refills: 0 | Status: COMPLETED | OUTPATIENT
Start: 2017-02-16 | End: 2017-02-16

## 2017-02-16 RX ORDER — ENOXAPARIN SODIUM 100 MG/ML
60 INJECTION SUBCUTANEOUS DAILY
Qty: 0 | Refills: 0 | Status: DISCONTINUED | OUTPATIENT
Start: 2017-02-16 | End: 2017-03-13

## 2017-02-16 RX ORDER — DIPHENHYDRAMINE HCL 50 MG
50 CAPSULE ORAL ONCE
Qty: 0 | Refills: 0 | Status: COMPLETED | OUTPATIENT
Start: 2017-02-16 | End: 2017-02-23

## 2017-02-16 RX ORDER — LIDOCAINE HCL 20 MG/ML
20 VIAL (ML) INJECTION ONCE
Qty: 0 | Refills: 0 | Status: COMPLETED | OUTPATIENT
Start: 2017-02-16 | End: 2017-02-16

## 2017-02-16 RX ORDER — ENOXAPARIN SODIUM 100 MG/ML
70 INJECTION SUBCUTANEOUS
Qty: 0 | Refills: 0 | Status: DISCONTINUED | OUTPATIENT
Start: 2017-02-16 | End: 2017-02-16

## 2017-02-16 RX ORDER — SODIUM POLYSTYRENE SULFONATE 4.1 MEQ/G
30 POWDER, FOR SUSPENSION ORAL ONCE
Qty: 0 | Refills: 0 | Status: COMPLETED | OUTPATIENT
Start: 2017-02-16 | End: 2017-02-16

## 2017-02-16 RX ORDER — ELECTROLYTE SOLUTION,INJ
1 VIAL (ML) INTRAVENOUS
Qty: 0 | Refills: 0 | Status: DISCONTINUED | OUTPATIENT
Start: 2017-02-16 | End: 2017-02-16

## 2017-02-16 RX ORDER — SODIUM CHLORIDE 9 MG/ML
1000 INJECTION INTRAMUSCULAR; INTRAVENOUS; SUBCUTANEOUS
Qty: 0 | Refills: 0 | Status: DISCONTINUED | OUTPATIENT
Start: 2017-02-16 | End: 2017-02-19

## 2017-02-16 RX ORDER — DIPHENHYDRAMINE HCL 50 MG
50 CAPSULE ORAL ONCE
Qty: 0 | Refills: 0 | Status: DISCONTINUED | OUTPATIENT
Start: 2017-02-16 | End: 2017-02-16

## 2017-02-16 RX ORDER — I.V. FAT EMULSION 20 G/100ML
50 EMULSION INTRAVENOUS
Qty: 0 | Refills: 0 | Status: DISCONTINUED | OUTPATIENT
Start: 2017-02-16 | End: 2017-02-16

## 2017-02-16 RX ADMIN — ESCITALOPRAM OXALATE 20 MILLIGRAM(S): 10 TABLET, FILM COATED ORAL at 15:32

## 2017-02-16 RX ADMIN — Medication 250 MILLIGRAM(S): at 01:50

## 2017-02-16 RX ADMIN — Medication 325 MILLIGRAM(S): at 15:32

## 2017-02-16 RX ADMIN — PANTOPRAZOLE SODIUM 40 MILLIGRAM(S): 20 TABLET, DELAYED RELEASE ORAL at 15:32

## 2017-02-16 RX ADMIN — ENOXAPARIN SODIUM 70 MILLIGRAM(S): 100 INJECTION SUBCUTANEOUS at 06:57

## 2017-02-16 RX ADMIN — Medication 400 UNIT(S): at 15:59

## 2017-02-16 RX ADMIN — Medication 1 TABLET(S): at 15:32

## 2017-02-16 RX ADMIN — Medication 10 MILLIGRAM(S): at 22:43

## 2017-02-16 RX ADMIN — PREGABALIN 250 MICROGRAM(S): 225 CAPSULE ORAL at 15:59

## 2017-02-16 RX ADMIN — ENOXAPARIN SODIUM 60 MILLIGRAM(S): 100 INJECTION SUBCUTANEOUS at 15:00

## 2017-02-16 RX ADMIN — Medication 875 MILLIGRAM(S): at 19:32

## 2017-02-16 RX ADMIN — Medication 20 MILLILITER(S): at 11:18

## 2017-02-16 RX ADMIN — ZINC SULFATE TAB 220 MG (50 MG ZINC EQUIVALENT) 220 MILLIGRAM(S): 220 (50 ZN) TAB at 15:32

## 2017-02-16 RX ADMIN — Medication 42 EACH: at 21:35

## 2017-02-16 RX ADMIN — Medication 100 MILLIGRAM(S): at 15:32

## 2017-02-16 RX ADMIN — SODIUM POLYSTYRENE SULFONATE 30 GRAM(S): 4.1 POWDER, FOR SUSPENSION ORAL at 06:58

## 2017-02-16 NOTE — PHYSICAL THERAPY INITIAL EVALUATION ADULT - CRITERIA FOR SKILLED THERAPEUTIC INTERVENTIONS
therapy frequency/impairments found/anticipated discharge recommendation/anticipated equipment needs at discharge/rehab potential

## 2017-02-16 NOTE — PHYSICAL THERAPY INITIAL EVALUATION ADULT - GENERAL OBSERVATIONS, REHAB EVAL
Patient received supine in bed with + heplock IV,  at bedside. Reports abdominal pain with activity, worst in standing.

## 2017-02-16 NOTE — PROGRESS NOTE ADULT - SUBJECTIVE AND OBJECTIVE BOX
O/N: lost IV access multiple times, admission K 5.6, no EKG changes, given 1L bolus by ED, rpt K 4.9 O/N: lost IV access multiple times, admission K 5.6, no EKG changes, given 1L bolus by ED, rpt K 4.9    SUBJECTIVE: Pt seen and examined at bedside. No overnight events.  Pain controlled. No f/c/n/v    Vital Signs Last 24 Hrs  T(C): 36.6, Max: 36.6 (02-15 @ 16:12)  T(F): 97.9, Max: 97.9 (02-15 @ 16:12)  HR: 71 (64 - 93)  BP: 155/90 (82/55 - 155/90)  BP(mean): --  RR: 16 (14 - 18)  SpO2: 93% (93% - 99%)    PHYSICAL EXAM    Gen: NAD  Abd: Soft, NT/ND, G tube in place, two areas of drainage on left side of abdomen, midline I&D site draining    I&O's Detail      LABS:                        7.4    12.2  )-----------( 338      ( 15 Feb 2017 17:24 )             23.8     16 Feb 2017 02:36    140    |  105    |  36     ----------------------------<  86     4.9     |  28     |  0.95     Ca    8.7        16 Feb 2017 02:36    TPro  7.5    /  Alb  2.3    /  TBili  0.4    /  DBili  x      /  AST  50     /  ALT  100    /  AlkPhos  295    15 Feb 2017 17:24    PT/INR - ( 15 Feb 2017 17:24 )   PT: 14.7 sec;   INR: 1.32          PTT - ( 15 Feb 2017 17:24 )  PTT:24.9 sec  Urinalysis Basic - ( 15 Feb 2017 22:01 )    Color: Yellow / Appearance: Clear / SG: <=1.005 / pH: x  Gluc: x / Ketone: NEGATIVE  / Bili: NEGATIVE / Urobili: 0.2 E.U./dL   Blood: x / Protein: NEGATIVE mg/dL / Nitrite: NEGATIVE   Leuk Esterase: NEGATIVE / RBC: x / WBC x   Sq Epi: x / Non Sq Epi: x / Bacteria: x        MEDICATIONS  (STANDING):  vancomycin  IVPB  IV Intermittent   losartan 100milliGRAM(s) Oral daily  escitalopram 20milliGRAM(s) Oral daily  ALBUTerol    90 MICROgram(s) HFA Inhaler 1Puff(s) Inhalation every 4 hours  zinc sulfate 220milliGRAM(s) Oral daily  pantoprazole  Injectable 40milliGRAM(s) IV Push daily  cyanocobalamin 250MICROGram(s) Oral daily  cholecalciferol 400Unit(s) Oral daily  thiamine 100milliGRAM(s) Oral daily  multivitamin 1Tablet(s) Oral daily  ferrous    sulfate 325milliGRAM(s) Oral daily  enoxaparin Injectable 70milliGRAM(s) SubCutaneous two times a day  vancomycin  IVPB 1000milliGRAM(s) IV Intermittent every 12 hours  sodium chloride 0.9%. 1000milliLiter(s) IV Continuous <Continuous>  sodium chloride 0.9% Bolus 1000milliLiter(s) IV Bolus once  diphenhydrAMINE   Injectable 50milliGRAM(s) IV Push once    MEDICATIONS  (PRN):  acetaminophen   Tablet. 325milliGRAM(s) Oral every 4 hours PRN Moderate Pain (4 - 6)  HYDROmorphone  Injectable 0.2milliGRAM(s) IV Push every 4 hours PRN Severe Pain (7 - 10)  ondansetron Injectable 4milliGRAM(s) IV Push every 6 hours PRN Nausea  diazepam    Tablet 10milliGRAM(s) Oral at bedtime PRN Anxiety  diphenhydrAMINE   Capsule 25milliGRAM(s) Oral at bedtime PRN Insomnia  ALBUTerol    0.083% 2.5milliGRAM(s) Nebulizer every 6 hours PRN Shortness of Breath and/or Wheezing  oxybutynin 5milliGRAM(s) Oral two times a day PRN Bladder spasms      RADIOLOGY & ADDITIONAL STUDIES:    ASSESSMENT AND PLAN

## 2017-02-16 NOTE — CONSULT NOTE ADULT - SUBJECTIVE AND OBJECTIVE BOX
Vascular Access Service Consult Note    56yFemaleHEAL ISSUES - PROBLEM Dx:             Diagnosis: wound drainage    Indications for Vascular Access (Check all that apply)  [ x ]  Antibiotic Therapy       Antibiotic Prescribed: vancomyin                                          Expected Duration of Therapy:               [  ]  IV Hydration  [  ]  Total Parenteral Nutrition  [  ]  Chemotherapy  [ x ]  Difficult Venous Access  [  ]  CVP monitoring  [  ]  Medications with high potential for tissue necrosis on extravasation  [  ]  Other    Screening (Check all that apply)  Previous Radiation to chest  [  ] Yes      [ x ]  No  Breast Cancer                          [  ] Left     [  ]  Right    [ x ]  No - tumor removal from left breast  Lymph Node Dissection         [ x ] Left     [  ]  Right    [  ]  No  Pacemaker or ICD                   [  ] Left     [  ]  Right    [ x ]  No  Upper Extremity DVT             [ x ] Left     [  ]  Right    [  ]  No  Chronic Kidney Disease         [  ]  Yes     [ x ]  No  Hemodialysis                           [  ]  Yes     [ x ]  No  AV Fistula/ Graft                     [  ]  Left    [  ]  Right    [ x ]  No  Temp>101F in past 24 H       [  ]  Yes     [ x ]  No  H/O PICC/Midline                   [ x ]  Yes - right basilic     [  ]  No    Lab data:                        7.4    12.2  )-----------( 338      ( 15 Feb 2017 17:24 )             23.8     16 Feb 2017 02:36    140    |  105    |  36     ----------------------------<  86     4.9     |  28     |  0.95     Ca    8.7        16 Feb 2017 02:36    TPro  7.5    /  Alb  2.3    /  TBili  0.4    /  DBili  x      /  AST  50     /  ALT  100    /  AlkPhos  295    15 Feb 2017 17:24    PT/INR - ( 15 Feb 2017 17:24 )   PT: 14.7 sec;   INR: 1.32          PTT - ( 15 Feb 2017 17:24 )  PTT:24.9 sec  Culture Results:   No growth at 12 hours (02-15 @ 20:48)  Culture Results:   No growth at 12 hours (02-15 @ 20:48)          I have reviewed the chart, interviewed and examined the patient and determined that this patient:  [ x ] Is a candidate for a PICC line  [  ] Is a candidate for a Midline  [  ] Is not a candidate for vascular access device (reason)    Lumens:    [ x ] Single  [  ] Double

## 2017-02-16 NOTE — PHYSICAL THERAPY INITIAL EVALUATION ADULT - PERTINENT HX OF CURRENT PROBLEM, REHAB EVAL
Patient is a 56 year old female who presents from rehab with abdominal abscess. Significant history of abdominal surgery (gastric bypass in 2002; revised in Nov 2016, complicated recovery including trach for respiratory compromise and various infections. See H&P for full details.

## 2017-02-16 NOTE — PHYSICAL THERAPY INITIAL EVALUATION ADULT - GAIT DISTANCE, PT EVAL
Patient stood x 3 trials. 1st trial: no steps taken. 2nd trial: 3 side steps taken. 3rd trial: 2 side steps taken. All with mod assist x 2.

## 2017-02-16 NOTE — PROGRESS NOTE ADULT - ASSESSMENT
56F w/PMHx of HTN, gastric bypass in 2002 c/b stricture, corkscrewed sleeve and chronic leak, recently revised on 11/28/16 with protracted (70-day) postoperative course complicated by MDR infections, respiratory compromise 2/2 plugging/PNA/effusions requiring multiple interventions and a trach, as well as a continued leak requiring a draining double-pigtail with stenting performed by GI, who presents today after recurrent fevers likely 2/2 abscess formation in LLQ.    - Admit to team 2 surgery (Dr. Brady)  - NPO, IVF  - Will increase TFs (Osmolite 1.2/6pm to 6am)  - Will place wound vac  - 1 unit pRBCs for hgb of 7.4  - IV Vancomycin due to h/o wound infection with MDR Pseudomonas (resistant to Zosyn)  - Continue home medications  - IR consult for drainage vs subcutaneous I&D of abscess

## 2017-02-16 NOTE — PROGRESS NOTE ADULT - SUBJECTIVE AND OBJECTIVE BOX
We sent from office yesterday after speaking with nurse from rehab who was concerned about wound drainage  ct done and no intra abdominal collection or obvious fistula  abscess called is old drain tract that we will widen  no fever or chills  had dr rubi see  there is a sinus tract at top of wound that can have small connection to bowel but low output  renal bun elevated cr down and that can mean is anabolic  but will maximizes nutriion with tube feeds  and po  also since placing picc will give amino acids and lipids  use liquid augmentin  no sepsis  no fever  given blood  give b12  dr montiel and miles covering

## 2017-02-17 LAB
ANION GAP SERPL CALC-SCNC: 10 MMOL/L — SIGNIFICANT CHANGE UP (ref 9–16)
BUN SERPL-MCNC: 28 MG/DL — HIGH (ref 7–23)
CALCIUM SERPL-MCNC: 9.3 MG/DL — SIGNIFICANT CHANGE UP (ref 8.5–10.5)
CHLORIDE SERPL-SCNC: 105 MMOL/L — SIGNIFICANT CHANGE UP (ref 96–108)
CO2 SERPL-SCNC: 25 MMOL/L — SIGNIFICANT CHANGE UP (ref 22–31)
CREAT SERPL-MCNC: 0.56 MG/DL — SIGNIFICANT CHANGE UP (ref 0.5–1.3)
GLUCOSE SERPL-MCNC: 94 MG/DL — SIGNIFICANT CHANGE UP (ref 70–99)
GRAM STN FLD: SIGNIFICANT CHANGE UP
GRAM STN FLD: SIGNIFICANT CHANGE UP
HCT VFR BLD CALC: 27.2 % — LOW (ref 34.5–45)
HGB BLD-MCNC: 8.3 G/DL — LOW (ref 11.5–15.5)
MAGNESIUM SERPL-MCNC: 2.2 MG/DL — SIGNIFICANT CHANGE UP (ref 1.6–2.4)
MCHC RBC-ENTMCNC: 27.4 PG — SIGNIFICANT CHANGE UP (ref 27–34)
MCHC RBC-ENTMCNC: 30.5 G/DL — LOW (ref 32–36)
MCV RBC AUTO: 89.8 FL — SIGNIFICANT CHANGE UP (ref 80–100)
PHOSPHATE SERPL-MCNC: 3.2 MG/DL — SIGNIFICANT CHANGE UP (ref 2.5–4.5)
PLATELET # BLD AUTO: 289 K/UL — SIGNIFICANT CHANGE UP (ref 150–400)
POTASSIUM SERPL-MCNC: 4 MMOL/L — SIGNIFICANT CHANGE UP (ref 3.5–5.3)
POTASSIUM SERPL-SCNC: 4 MMOL/L — SIGNIFICANT CHANGE UP (ref 3.5–5.3)
RBC # BLD: 3.03 M/UL — LOW (ref 3.8–5.2)
RBC # FLD: 17.4 % — HIGH (ref 10.3–16.9)
SODIUM SERPL-SCNC: 140 MMOL/L — SIGNIFICANT CHANGE UP (ref 135–145)
SPECIMEN SOURCE: SIGNIFICANT CHANGE UP
SPECIMEN SOURCE: SIGNIFICANT CHANGE UP
WBC # BLD: 7.3 K/UL — SIGNIFICANT CHANGE UP (ref 3.8–10.5)
WBC # FLD AUTO: 7.3 K/UL — SIGNIFICANT CHANGE UP (ref 3.8–10.5)

## 2017-02-17 RX ORDER — I.V. FAT EMULSION 20 G/100ML
250 EMULSION INTRAVENOUS ONCE
Qty: 0 | Refills: 0 | Status: COMPLETED | OUTPATIENT
Start: 2017-02-17 | End: 2017-02-17

## 2017-02-17 RX ORDER — ELECTROLYTE SOLUTION,INJ
1 VIAL (ML) INTRAVENOUS
Qty: 0 | Refills: 0 | Status: DISCONTINUED | OUTPATIENT
Start: 2017-02-17 | End: 2017-02-17

## 2017-02-17 RX ADMIN — LOSARTAN POTASSIUM 100 MILLIGRAM(S): 100 TABLET, FILM COATED ORAL at 05:24

## 2017-02-17 RX ADMIN — Medication 325 MILLIGRAM(S): at 11:59

## 2017-02-17 RX ADMIN — Medication 1 TABLET(S): at 11:59

## 2017-02-17 RX ADMIN — PANTOPRAZOLE SODIUM 40 MILLIGRAM(S): 20 TABLET, DELAYED RELEASE ORAL at 12:00

## 2017-02-17 RX ADMIN — SODIUM CHLORIDE 125 MILLILITER(S): 9 INJECTION INTRAMUSCULAR; INTRAVENOUS; SUBCUTANEOUS at 05:24

## 2017-02-17 RX ADMIN — Medication 400 UNIT(S): at 11:58

## 2017-02-17 RX ADMIN — Medication 875 MILLIGRAM(S): at 05:27

## 2017-02-17 RX ADMIN — SODIUM CHLORIDE 125 MILLILITER(S): 9 INJECTION INTRAMUSCULAR; INTRAVENOUS; SUBCUTANEOUS at 12:13

## 2017-02-17 RX ADMIN — PREGABALIN 250 MICROGRAM(S): 225 CAPSULE ORAL at 11:58

## 2017-02-17 RX ADMIN — Medication 10 MILLIGRAM(S): at 12:00

## 2017-02-17 RX ADMIN — Medication 1 EACH: at 19:39

## 2017-02-17 RX ADMIN — ESCITALOPRAM OXALATE 20 MILLIGRAM(S): 10 TABLET, FILM COATED ORAL at 11:59

## 2017-02-17 RX ADMIN — I.V. FAT EMULSION 20.83 MILLILITER(S): 20 EMULSION INTRAVENOUS at 23:36

## 2017-02-17 RX ADMIN — ENOXAPARIN SODIUM 60 MILLIGRAM(S): 100 INJECTION SUBCUTANEOUS at 12:12

## 2017-02-17 RX ADMIN — Medication 875 MILLIGRAM(S): at 18:20

## 2017-02-17 NOTE — PROGRESS NOTE ADULT - SUBJECTIVE AND OBJECTIVE BOX
57 yo female readmitted for superficial abdominal abscess and hypotension s/p complicated postop course s/p bariatric revision.  Pt with no complaints today.  Pt states she was oob to chair three times.  Last bm wednesday.  Denies fever, chills, n/v, chest pain, sob, abdominal pain, leg pain.                        8.3    7.3   )-----------( 289      ( 17 Feb 2017 07:04 )             27.2     17 Feb 2017 07:04    140    |  105    |  28     ----------------------------<  94     4.0     |  25     |  0.56     Ca    9.3        17 Feb 2017 07:04  Phos  3.2       17 Feb 2017 07:04  Mg     2.2       17 Feb 2017 07:04    TPro  7.5    /  Alb  2.3    /  TBili  0.4    /  DBili  x      /  AST  50     /  ALT  100    /  AlkPhos  295    15 Feb 2017 17:24    T(C): 36.8, Max: 37.1 (02-17 @ 05:12)  HR: 76 (75 - 78)  BP: 163/89 (124/86 - 163/89)  RR: 16 (16 - 18)  SpO2: 96% (95% - 98%)  Wt(kg): --    gen: A&0x3, NAD  Heart: s1,s2 RRR,   Lung: CTA B/L  Abd: n/d, n/t, soft, incisions clean, dry no drainage and no surrounding erythema or warmth, packed  LE: no edema, b/l LE, no gerard b/l LE  skin: MMM,       57 yo female readmitted for superficial  abdominal abscess, hypotension and intermittent fever, vss, abscesses i&d'd labs stable    Plan:  incentive spirometer  daily wound changes  jtube feeds  oob, scds, heparin  ppi  nausea control prn  pain control prn  meropenem 55 yo female readmitted for superficial abdominal abscess and hypotension s/p complicated postop course s/p bariatric revision.  Pt with no complaints today.  Pt states she was oob to chair three times.  Last bm wednesday.  Denies fever, chills, n/v, chest pain, sob, abdominal pain, leg pain.                        8.3    7.3   )-----------( 289      ( 17 Feb 2017 07:04 )             27.2     17 Feb 2017 07:04    140    |  105    |  28     ----------------------------<  94     4.0     |  25     |  0.56     Ca    9.3        17 Feb 2017 07:04  Phos  3.2       17 Feb 2017 07:04  Mg     2.2       17 Feb 2017 07:04    TPro  7.5    /  Alb  2.3    /  TBili  0.4    /  DBili  x      /  AST  50     /  ALT  100    /  AlkPhos  295    15 Feb 2017 17:24    T(C): 36.8, Max: 37.1 (02-17 @ 05:12)  HR: 76 (75 - 78)  BP: 163/89 (124/86 - 163/89)  RR: 16 (16 - 18)  SpO2: 96% (95% - 98%)  Wt(kg): --    gen: A&0x3, NAD  Heart: s1,s2 RRR,   Lung: CTA B/L  Abd: n/d, n/t, soft, incisions clean, dry no drainage and no surrounding erythema or warmth, packed  LE: no edema, b/l LE, no gerard b/l LE  skin: MMM,       55 yo female readmitted for superficial  abdominal abscess, hypotension and intermittent fever, vss, abscesses i&d'd labs stable    Plan:  incentive spirometer  daily wound changes  jtube feeds  oob, scds, lovenox  ppi  nausea control prn  pain control prn  augmentin 55 yo female readmitted for superficial abdominal abscess and hypotension s/p complicated postop course s/p bariatric revision.  Pt with no complaints today.  Pt states she was oob to chair three times.  Last bm wednesday.  Denies fever, chills, n/v, chest pain, sob, abdominal pain, leg pain.                        8.3    7.3   )-----------( 289      ( 17 Feb 2017 07:04 )             27.2     17 Feb 2017 07:04    140    |  105    |  28     ----------------------------<  94     4.0     |  25     |  0.56     Ca    9.3        17 Feb 2017 07:04  Phos  3.2       17 Feb 2017 07:04  Mg     2.2       17 Feb 2017 07:04    TPro  7.5    /  Alb  2.3    /  TBili  0.4    /  DBili  x      /  AST  50     /  ALT  100    /  AlkPhos  295    15 Feb 2017 17:24    T(C): 36.8, Max: 37.1 (02-17 @ 05:12)  HR: 76 (75 - 78)  BP: 163/89 (124/86 - 163/89)  RR: 16 (16 - 18)  SpO2: 96% (95% - 98%)  Wt(kg): --    gen: A&0x3, NAD  Heart: s1,s2 RRR,   Lung: CTA B/L  Abd: n/d, n/t, soft, incisions clean, dry no drainage and no surrounding erythema or warmth, packed  LE: no edema, b/l LE, no gerard b/l LE  skin: MMM,       55 yo female readmitted for superficial  abdominal abscess, hypotension and intermittent fever, vss, abscesses i&d'd labs stable    Plan:  incentive spirometer  daily wound changes  jtube feeds  oob, scds, lovenox  ppi  nausea control prn  pain control prn  augmentin  PT

## 2017-02-17 NOTE — DIETITIAN INITIAL EVALUATION ADULT. - ENERGY NEEDS
Height: 63" Weight: 162lbs, IBW 115lbs+/-10%, %%, BMI - 28.7  IBW used to calculate energy needs due to pt's current body weight exceeding 120% of IBW   Nutrient needs based on [facility] standards of care for maintenance in adults. Needs adjusted 2/2 pt with hx of bariatric surgery revision in November and pt with stage II pressure ulcer.

## 2017-02-17 NOTE — DIETITIAN INITIAL EVALUATION ADULT. - DIET TYPE
w/ Osmolite 1.2 @ 40 cc/hr x 24 hrs. Provides 1152, TV: 960 cc, pro: 53 g, fluid: 787 cc free fluid/regular

## 2017-02-17 NOTE — DIETITIAN INITIAL EVALUATION ADULT. - NS AS NUTRI INTERV ENTERAL NUTRITION
Continue EN, yet rec. increasing rate to 70 cc/hr  x 18 hrs. Rec. decreaing amount of time on EN pump to help encourage appetite/PO intake. Provides: 1512 kcal,  1260 TV,  69.9 g pro, and  1033 cc free water/day. Rec. prostat 1 x day via PEG to provide an additional 72 kcal. and 15 g protein.

## 2017-02-17 NOTE — PROGRESS NOTE ADULT - SUBJECTIVE AND OBJECTIVE BOX
O/N: transfused 1 unit, TPN started peripherally  2/16:  old drainage site I&D'd, packed, fistula track packed, will get 1u pRBC, as per IR, nothing to drain    SUBJECTIVE: Pt seen and examined at bedside. No overnight events.  Pain controlled. No f/c/n/v.     Vital Signs Last 24 Hrs  T(C): 37.1, Max: 37.1 (02-17 @ 05:12)  T(F): 98.7, Max: 98.7 (02-17 @ 05:12)  HR: 78 (75 - 78)  BP: 124/86 (124/86 - 146/84)  BP(mean): --  RR: 17 (16 - 18)  SpO2: 98% (94% - 98%)    PHYSICAL EXAM    Gen: NAD  Abd: Soft, NT/ND, midline wound with packed fistula tract, L sided abdominal wall abscess site I&D packed, draining, midline abscess I&D packed, draining, G tube in place    I&O's Detail    I & Os for current day (as of 17 Feb 2017 07:49)  =============================================  IN:    Oral Fluid: 780 ml    Packed Red Blood Cells: 500 ml    PPN (Peripheral Parenteral Nutrition): 252 ml    Total IN: 1532 ml  ---------------------------------------------  OUT:    Voided: 1300 ml    Total OUT: 1300 ml  ---------------------------------------------  Total NET: 232 ml      LABS:                        8.3    7.3   )-----------( 289      ( 17 Feb 2017 07:04 )             27.2     17 Feb 2017 07:04    140    |  105    |  28     ----------------------------<  94     4.0     |  25     |  0.56     Ca    9.3        17 Feb 2017 07:04  Phos  3.2       17 Feb 2017 07:04  Mg     2.2       17 Feb 2017 07:04    TPro  7.5    /  Alb  2.3    /  TBili  0.4    /  DBili  x      /  AST  50     /  ALT  100    /  AlkPhos  295    15 Feb 2017 17:24    PT/INR - ( 15 Feb 2017 17:24 )   PT: 14.7 sec;   INR: 1.32          PTT - ( 15 Feb 2017 17:24 )  PTT:24.9 sec  Urinalysis Basic - ( 15 Feb 2017 22:01 )    Color: Yellow / Appearance: Clear / SG: <=1.005 / pH: x  Gluc: x / Ketone: NEGATIVE  / Bili: NEGATIVE / Urobili: 0.2 E.U./dL   Blood: x / Protein: NEGATIVE mg/dL / Nitrite: NEGATIVE   Leuk Esterase: NEGATIVE / RBC: x / WBC x   Sq Epi: x / Non Sq Epi: x / Bacteria: x        MEDICATIONS  (STANDING):  losartan 100milliGRAM(s) Oral daily  escitalopram 20milliGRAM(s) Oral daily  ALBUTerol    90 MICROgram(s) HFA Inhaler 1Puff(s) Inhalation every 4 hours  zinc sulfate 220milliGRAM(s) Oral daily  pantoprazole  Injectable 40milliGRAM(s) IV Push daily  cyanocobalamin 250MICROGram(s) Oral daily  cholecalciferol 400Unit(s) Oral daily  thiamine 100milliGRAM(s) Oral daily  multivitamin 1Tablet(s) Oral daily  ferrous    sulfate 325milliGRAM(s) Oral daily  sodium chloride 0.9%. 1000milliLiter(s) IV Continuous <Continuous>  sodium chloride 0.9% Bolus 1000milliLiter(s) IV Bolus once  diphenhydrAMINE   Capsule 50milliGRAM(s) Oral once  Parenteral Nutrition - Adult 1Each TPN Continuous <Continuous>  fat emulsion 20% Infusion 50Gram(s) IV Continuous <Continuous>  enoxaparin Injectable 60milliGRAM(s) SubCutaneous daily  amoxicillin   80 mG/mL/clavulanate Suspension 875milliGRAM(s) Enteral Tube two times a day    MEDICATIONS  (PRN):  acetaminophen   Tablet. 325milliGRAM(s) Oral every 4 hours PRN Moderate Pain (4 - 6)  ondansetron Injectable 4milliGRAM(s) IV Push every 6 hours PRN Nausea  diazepam    Tablet 10milliGRAM(s) Oral at bedtime PRN Anxiety  diphenhydrAMINE   Capsule 25milliGRAM(s) Oral at bedtime PRN Insomnia  ALBUTerol    0.083% 2.5milliGRAM(s) Nebulizer every 6 hours PRN Shortness of Breath and/or Wheezing  oxybutynin 5milliGRAM(s) Oral two times a day PRN Bladder spasms  oxyCODONE  5 mG/acetaminophen 325 mG 1Tablet(s) Oral every 4 hours PRN Severe Pain (7 - 10)      RADIOLOGY & ADDITIONAL STUDIES:    ASSESSMENT AND PLAN

## 2017-02-17 NOTE — DIETITIAN INITIAL EVALUATION ADULT. - OTHER INFO
Pt readmitted for superficial abdominal abscess and hypotension s/p complicated postop course s/p bariatric revision. Pt denies any n/v/d, yet reports constipation - requesting a suppository. No pain, pt resting comfortably in bed. Pt on PEJ feedings Osmolite 1.2 @ goal rate of 40 cc/hr x 24 hrs. Also on regular diet with minimal PO intake (~25%). Pt also ordered for TPN - 90 g AA, and lipids 20% 50 g (no dextrose). Pt can meet 100% of needs via EN, rec. D/C'ing TPN. Pt reports ~4 lb weight gain since last admission. Pt with stage II pressure ulcer.

## 2017-02-17 NOTE — PROGRESS NOTE ADULT - ASSESSMENT
6F w/PMHx of HTN, gastric bypass in 2002 c/b stricture, corkscrewed sleeve and chronic leak, recently revised on 11/28/16 with protracted (70-day) postoperative course complicated by MDR infections, respiratory compromise 2/2 plugging/PNA/effusions requiring multiple interventions and a trach, as well as a continued leak requiring a draining double-pigtail with stenting performed by GI, who presents today after recurrent fevers likely 2/2 abscess formation in LLQ.    - reg diet, tube feeds, amino acids, vitamins  - Will increase TFs (Osmolite 1.2/6pm to 6am)  - Augmentin  - Continue home medications

## 2017-02-18 LAB
ANION GAP SERPL CALC-SCNC: 9 MMOL/L — SIGNIFICANT CHANGE UP (ref 9–16)
BUN SERPL-MCNC: 23 MG/DL — SIGNIFICANT CHANGE UP (ref 7–23)
CALCIUM SERPL-MCNC: 9 MG/DL — SIGNIFICANT CHANGE UP (ref 8.5–10.5)
CHLORIDE SERPL-SCNC: 107 MMOL/L — SIGNIFICANT CHANGE UP (ref 96–108)
CO2 SERPL-SCNC: 25 MMOL/L — SIGNIFICANT CHANGE UP (ref 22–31)
CREAT SERPL-MCNC: 0.62 MG/DL — SIGNIFICANT CHANGE UP (ref 0.5–1.3)
GLUCOSE SERPL-MCNC: 108 MG/DL — HIGH (ref 70–99)
HCT VFR BLD CALC: 26.7 % — LOW (ref 34.5–45)
HGB BLD-MCNC: 8.3 G/DL — LOW (ref 11.5–15.5)
MAGNESIUM SERPL-MCNC: 1.9 MG/DL — SIGNIFICANT CHANGE UP (ref 1.6–2.4)
MCHC RBC-ENTMCNC: 27.8 PG — SIGNIFICANT CHANGE UP (ref 27–34)
MCHC RBC-ENTMCNC: 31.1 G/DL — LOW (ref 32–36)
MCV RBC AUTO: 89.3 FL — SIGNIFICANT CHANGE UP (ref 80–100)
PHOSPHATE SERPL-MCNC: 2.6 MG/DL — SIGNIFICANT CHANGE UP (ref 2.5–4.5)
PLATELET # BLD AUTO: 281 K/UL — SIGNIFICANT CHANGE UP (ref 150–400)
POTASSIUM SERPL-MCNC: 3.8 MMOL/L — SIGNIFICANT CHANGE UP (ref 3.5–5.3)
POTASSIUM SERPL-SCNC: 3.8 MMOL/L — SIGNIFICANT CHANGE UP (ref 3.5–5.3)
RBC # BLD: 2.99 M/UL — LOW (ref 3.8–5.2)
RBC # FLD: 17.2 % — HIGH (ref 10.3–16.9)
SODIUM SERPL-SCNC: 141 MMOL/L — SIGNIFICANT CHANGE UP (ref 135–145)
WBC # BLD: 6.1 K/UL — SIGNIFICANT CHANGE UP (ref 3.8–10.5)
WBC # FLD AUTO: 6.1 K/UL — SIGNIFICANT CHANGE UP (ref 3.8–10.5)

## 2017-02-18 RX ORDER — POTASSIUM PHOSPHATE, MONOBASIC POTASSIUM PHOSPHATE, DIBASIC 236; 224 MG/ML; MG/ML
15 INJECTION, SOLUTION INTRAVENOUS ONCE
Qty: 0 | Refills: 0 | Status: COMPLETED | OUTPATIENT
Start: 2017-02-18 | End: 2017-02-18

## 2017-02-18 RX ORDER — ELECTROLYTE SOLUTION,INJ
1 VIAL (ML) INTRAVENOUS
Qty: 0 | Refills: 0 | Status: DISCONTINUED | OUTPATIENT
Start: 2017-02-18 | End: 2017-02-18

## 2017-02-18 RX ADMIN — Medication 1 TABLET(S): at 12:01

## 2017-02-18 RX ADMIN — Medication 10 MILLIGRAM(S): at 01:03

## 2017-02-18 RX ADMIN — Medication 325 MILLIGRAM(S): at 12:01

## 2017-02-18 RX ADMIN — Medication 1 EACH: at 17:58

## 2017-02-18 RX ADMIN — ESCITALOPRAM OXALATE 20 MILLIGRAM(S): 10 TABLET, FILM COATED ORAL at 12:01

## 2017-02-18 RX ADMIN — SODIUM CHLORIDE 125 MILLILITER(S): 9 INJECTION INTRAMUSCULAR; INTRAVENOUS; SUBCUTANEOUS at 21:44

## 2017-02-18 RX ADMIN — ALBUTEROL 2.5 MILLIGRAM(S): 90 AEROSOL, METERED ORAL at 19:32

## 2017-02-18 RX ADMIN — ENOXAPARIN SODIUM 60 MILLIGRAM(S): 100 INJECTION SUBCUTANEOUS at 12:00

## 2017-02-18 RX ADMIN — Medication 875 MILLIGRAM(S): at 18:18

## 2017-02-18 RX ADMIN — PANTOPRAZOLE SODIUM 40 MILLIGRAM(S): 20 TABLET, DELAYED RELEASE ORAL at 12:00

## 2017-02-18 RX ADMIN — LOSARTAN POTASSIUM 100 MILLIGRAM(S): 100 TABLET, FILM COATED ORAL at 07:55

## 2017-02-18 RX ADMIN — POTASSIUM PHOSPHATE, MONOBASIC POTASSIUM PHOSPHATE, DIBASIC 62.5 MILLIMOLE(S): 236; 224 INJECTION, SOLUTION INTRAVENOUS at 12:01

## 2017-02-18 RX ADMIN — Medication 875 MILLIGRAM(S): at 07:55

## 2017-02-18 RX ADMIN — Medication 400 UNIT(S): at 12:01

## 2017-02-18 RX ADMIN — PREGABALIN 250 MICROGRAM(S): 225 CAPSULE ORAL at 12:01

## 2017-02-18 RX ADMIN — Medication 25 MILLIGRAM(S): at 22:44

## 2017-02-18 RX ADMIN — SODIUM CHLORIDE 125 MILLILITER(S): 9 INJECTION INTRAMUSCULAR; INTRAVENOUS; SUBCUTANEOUS at 01:03

## 2017-02-18 NOTE — PROGRESS NOTE ADULT - SUBJECTIVE AND OBJECTIVE BOX
O/N: Afebrile, VSS, BRAYDEN. PICC line placed by IR, TPN resumed  2/17: wounds repacked, went for PICC by IR O/N: Afebrile, VSS, BRAYDEN. PICC line placed by IR, TPN resumed  2/17: wounds repacked, went for PICC by IR    STATUS POST:  sleeve to gastric bypass revision c/b multiple leaks, readmitted with hypoTN, fevers, abdominal abscess s/p I&D in office     SUBJECTIVE: Patient seen and examined bedside by chief resident. No complaints though feels resentful of being "poked and prodded" all day.    losartan 100milliGRAM(s) Oral daily  enoxaparin Injectable 60milliGRAM(s) SubCutaneous daily  amoxicillin   80 mG/mL/clavulanate Suspension 875milliGRAM(s) Enteral Tube two times a day      Vital Signs Last 24 Hrs  T(C): 36.8, Max: 37.1 (02-17 @ 21:24)  T(F): 98.3, Max: 98.8 (02-17 @ 21:24)  HR: 80 (65 - 80)  BP: 150/81 (135/84 - 154/95)  BP(mean): --  RR: 18 (16 - 18)  SpO2: 94% (94% - 98%)  I&O's Detail    I & Os for current day (as of 18 Feb 2017 09:33)  =============================================  IN:    sodium chloride 0.9%.: 2000 ml    Oral Fluid: 1200 ml    PPN (Peripheral Parenteral Nutrition): 333.6 ml    Fat Emulsion 20%: 166.6 ml    Total IN: 3700.2 ml  ---------------------------------------------  OUT:    Voided: 1350 ml    Total OUT: 1350 ml  ---------------------------------------------  Total NET: 2350.2 ml      General: NAD, resting comfortably in bed  Pulm: Nonlabored breathing, no respiratory distress  Abd: soft, NT/ND. obese. Multiple draining abscesses with packing along midline and along left abdomen. large area of granulating tissue on left abdomen with draining abscess at superior portion.  Extrem: WWP, no edema        LABS:                        8.3    6.1   )-----------( 281      ( 18 Feb 2017 06:51 )             26.7     18 Feb 2017 06:51    141    |  107    |  23     ----------------------------<  108    3.8     |  25     |  0.62     Ca    9.0        18 Feb 2017 06:51  Phos  2.6       18 Feb 2017 06:51  Mg     1.9       18 Feb 2017 06:51            RADIOLOGY & ADDITIONAL STUDIES:

## 2017-02-18 NOTE — PROGRESS NOTE ADULT - ASSESSMENT
6F w/PMHx of HTN, gastric bypass in 2002 c/b stricture, corkscrewed sleeve and chronic leak, recently revised on 11/28/16 with protracted (70-day) postoperative course complicated by MDR infections, respiratory compromise 2/2 plugging/PNA/effusions requiring multiple interventions and a trach, as well as a continued leak requiring a draining double-pigtail with stenting performed by GI, admitted due to recurrent fevers likely due to abscess formation.    - reg diet, tube feeds, amino acids, vitamins  - Augmentin  - Continue home medications  - DVTppx  - IS 56yoF w/PMHx of HTN, gastric bypass in 2002 c/b stricture, corkscrewed sleeve and chronic leak, recently revised on 11/28/16 with protracted (70-day) postoperative course complicated by MDR infections, respiratory compromise 2/2 plugging/PNA/effusions requiring multiple interventions and a trach, as well as a continued leak requiring a draining double-pigtail with stenting performed by GI, admitted due to recurrent fevers likely due to abscess formation.    - reg diet, tube feeds, amino acids, vitamins  - Augmentin day 3  - Continue home medications  - DVTppx  - IS  plan discussed with attending

## 2017-02-19 LAB
ANION GAP SERPL CALC-SCNC: 9 MMOL/L — SIGNIFICANT CHANGE UP (ref 9–16)
BUN SERPL-MCNC: 23 MG/DL — SIGNIFICANT CHANGE UP (ref 7–23)
CALCIUM SERPL-MCNC: 8.9 MG/DL — SIGNIFICANT CHANGE UP (ref 8.5–10.5)
CHLORIDE SERPL-SCNC: 109 MMOL/L — HIGH (ref 96–108)
CO2 SERPL-SCNC: 24 MMOL/L — SIGNIFICANT CHANGE UP (ref 22–31)
CREAT SERPL-MCNC: 0.46 MG/DL — LOW (ref 0.5–1.3)
GLUCOSE SERPL-MCNC: 83 MG/DL — SIGNIFICANT CHANGE UP (ref 70–99)
HCT VFR BLD CALC: 26.9 % — LOW (ref 34.5–45)
HGB BLD-MCNC: 8.3 G/DL — LOW (ref 11.5–15.5)
MAGNESIUM SERPL-MCNC: 1.9 MG/DL — SIGNIFICANT CHANGE UP (ref 1.6–2.4)
MCHC RBC-ENTMCNC: 27.5 PG — SIGNIFICANT CHANGE UP (ref 27–34)
MCHC RBC-ENTMCNC: 30.9 G/DL — LOW (ref 32–36)
MCV RBC AUTO: 89.1 FL — SIGNIFICANT CHANGE UP (ref 80–100)
PHOSPHATE SERPL-MCNC: 3.2 MG/DL — SIGNIFICANT CHANGE UP (ref 2.5–4.5)
PLATELET # BLD AUTO: 277 K/UL — SIGNIFICANT CHANGE UP (ref 150–400)
POTASSIUM SERPL-MCNC: 3.9 MMOL/L — SIGNIFICANT CHANGE UP (ref 3.5–5.3)
POTASSIUM SERPL-SCNC: 3.9 MMOL/L — SIGNIFICANT CHANGE UP (ref 3.5–5.3)
RBC # BLD: 3.02 M/UL — LOW (ref 3.8–5.2)
RBC # FLD: 17.3 % — HIGH (ref 10.3–16.9)
SODIUM SERPL-SCNC: 142 MMOL/L — SIGNIFICANT CHANGE UP (ref 135–145)
WBC # BLD: 6.9 K/UL — SIGNIFICANT CHANGE UP (ref 3.8–10.5)
WBC # FLD AUTO: 6.9 K/UL — SIGNIFICANT CHANGE UP (ref 3.8–10.5)

## 2017-02-19 RX ORDER — ELECTROLYTE SOLUTION,INJ
1 VIAL (ML) INTRAVENOUS
Qty: 0 | Refills: 0 | Status: DISCONTINUED | OUTPATIENT
Start: 2017-02-19 | End: 2017-02-19

## 2017-02-19 RX ADMIN — Medication 25 MILLIGRAM(S): at 10:28

## 2017-02-19 RX ADMIN — Medication 875 MILLIGRAM(S): at 17:44

## 2017-02-19 RX ADMIN — Medication 875 MILLIGRAM(S): at 06:40

## 2017-02-19 RX ADMIN — Medication 1 TABLET(S): at 13:48

## 2017-02-19 RX ADMIN — LOSARTAN POTASSIUM 100 MILLIGRAM(S): 100 TABLET, FILM COATED ORAL at 06:40

## 2017-02-19 RX ADMIN — ESCITALOPRAM OXALATE 20 MILLIGRAM(S): 10 TABLET, FILM COATED ORAL at 13:50

## 2017-02-19 RX ADMIN — PREGABALIN 250 MICROGRAM(S): 225 CAPSULE ORAL at 13:47

## 2017-02-19 RX ADMIN — Medication 10 MILLIGRAM(S): at 00:00

## 2017-02-19 RX ADMIN — SODIUM CHLORIDE 125 MILLILITER(S): 9 INJECTION INTRAMUSCULAR; INTRAVENOUS; SUBCUTANEOUS at 06:43

## 2017-02-19 RX ADMIN — ENOXAPARIN SODIUM 60 MILLIGRAM(S): 100 INJECTION SUBCUTANEOUS at 13:48

## 2017-02-19 RX ADMIN — PANTOPRAZOLE SODIUM 40 MILLIGRAM(S): 20 TABLET, DELAYED RELEASE ORAL at 13:48

## 2017-02-19 RX ADMIN — Medication 400 UNIT(S): at 13:47

## 2017-02-19 NOTE — PROGRESS NOTE ADULT - ASSESSMENT
57 yo F s/p sleeve to gastric bypass revision c/b multiple leaks and prolonged hospital course, readmitted with hypoTN, fevers, abdominal abscess s/p I&D in office    - reg diet, tube feeds, amino acids, vitamins  - Augmentin day 3  - Continue home medications  - DVTppx  - IS

## 2017-02-19 NOTE — PROGRESS NOTE ADULT - SUBJECTIVE AND OBJECTIVE BOX
O/N: Afebrile, VSS, BRAYDEN  2/18: Multiple draining abscesses repacked with ribbon packing. Healthy granulating tissue bed. TPN continued. VSS O/N: Afebrile, VSS, BRAYDEN  2/18: Multiple draining abscesses repacked with ribbon packing. Healthy granulating tissue bed. TPN continued. VSS      SUBJECTIVE: Pt seen and examined at bedside. No overnight events.  Pain controlled. No f/c/n/v.     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    I&O's Detail    I & Os for current day (as of 19 Feb 2017 08:40)  =============================================  IN:    sodium chloride 0.9%.: 2750 ml    PPN (Peripheral Parenteral Nutrition): 921 ml    Oral Fluid: 900 ml    Osmolite: 460 ml    Total IN: 5031 ml  ---------------------------------------------  OUT:    Voided: 2000 ml    Total OUT: 2000 ml  ---------------------------------------------  Total NET: 3031 ml      LABS:                        8.3    6.9   )-----------( 277      ( 19 Feb 2017 06:53 )             26.9     19 Feb 2017 06:53    142    |  109    |  23     ----------------------------<  83     3.9     |  24     |  0.46     Ca    8.9        19 Feb 2017 06:53  Phos  3.2       19 Feb 2017 06:53  Mg     1.9       19 Feb 2017 06:53            MEDICATIONS  (STANDING):  losartan 100milliGRAM(s) Oral daily  escitalopram 20milliGRAM(s) Oral daily  ALBUTerol    90 MICROgram(s) HFA Inhaler 1Puff(s) Inhalation every 4 hours  pantoprazole  Injectable 40milliGRAM(s) IV Push daily  cyanocobalamin 250MICROGram(s) Oral daily  cholecalciferol 400Unit(s) Oral daily  multivitamin 1Tablet(s) Oral daily  ferrous    sulfate 325milliGRAM(s) Oral daily  sodium chloride 0.9%. 1000milliLiter(s) IV Continuous <Continuous>  sodium chloride 0.9% Bolus 1000milliLiter(s) IV Bolus once  diphenhydrAMINE   Capsule 50milliGRAM(s) Oral once  enoxaparin Injectable 60milliGRAM(s) SubCutaneous daily  amoxicillin   80 mG/mL/clavulanate Suspension 875milliGRAM(s) Enteral Tube two times a day  Parenteral Nutrition - Adult 1Each TPN Continuous <Continuous>    MEDICATIONS  (PRN):  acetaminophen   Tablet. 325milliGRAM(s) Oral every 4 hours PRN Moderate Pain (4 - 6)  ondansetron Injectable 4milliGRAM(s) IV Push every 6 hours PRN Nausea  diazepam    Tablet 10milliGRAM(s) Oral at bedtime PRN Anxiety  diphenhydrAMINE   Capsule 25milliGRAM(s) Oral at bedtime PRN Insomnia  ALBUTerol    0.083% 2.5milliGRAM(s) Nebulizer every 6 hours PRN Shortness of Breath and/or Wheezing  oxybutynin 5milliGRAM(s) Oral two times a day PRN Bladder spasms  oxyCODONE  5 mG/acetaminophen 325 mG 1Tablet(s) Oral every 4 hours PRN Severe Pain (7 - 10)      RADIOLOGY & ADDITIONAL STUDIES:    ASSESSMENT AND PLAN

## 2017-02-20 LAB
-  AMPICILLIN: SIGNIFICANT CHANGE UP
-  AMPICILLIN: SIGNIFICANT CHANGE UP
-  CEFAZOLIN: SIGNIFICANT CHANGE UP
-  CLINDAMYCIN: SIGNIFICANT CHANGE UP
-  ERYTHROMYCIN: SIGNIFICANT CHANGE UP
-  LINEZOLID: SIGNIFICANT CHANGE UP
-  OXACILLIN: SIGNIFICANT CHANGE UP
-  PENICILLIN: SIGNIFICANT CHANGE UP
-  RIFAMPIN: SIGNIFICANT CHANGE UP
-  TRIMETHOPRIM/SULFAMETHOXAZOLE: SIGNIFICANT CHANGE UP
-  VANCOMYCIN: SIGNIFICANT CHANGE UP
ANION GAP SERPL CALC-SCNC: 8 MMOL/L — LOW (ref 9–16)
BUN SERPL-MCNC: 27 MG/DL — HIGH (ref 7–23)
CALCIUM SERPL-MCNC: 9.5 MG/DL — SIGNIFICANT CHANGE UP (ref 8.5–10.5)
CHLORIDE SERPL-SCNC: 106 MMOL/L — SIGNIFICANT CHANGE UP (ref 96–108)
CO2 SERPL-SCNC: 24 MMOL/L — SIGNIFICANT CHANGE UP (ref 22–31)
CREAT SERPL-MCNC: 0.48 MG/DL — LOW (ref 0.5–1.3)
CULTURE RESULTS: SIGNIFICANT CHANGE UP
GLUCOSE SERPL-MCNC: 87 MG/DL — SIGNIFICANT CHANGE UP (ref 70–99)
HCT VFR BLD CALC: 28.8 % — LOW (ref 34.5–45)
HGB BLD-MCNC: 9 G/DL — LOW (ref 11.5–15.5)
MAGNESIUM SERPL-MCNC: 1.9 MG/DL — SIGNIFICANT CHANGE UP (ref 1.6–2.4)
MCHC RBC-ENTMCNC: 27.9 PG — SIGNIFICANT CHANGE UP (ref 27–34)
MCHC RBC-ENTMCNC: 31.3 G/DL — LOW (ref 32–36)
MCV RBC AUTO: 89.2 FL — SIGNIFICANT CHANGE UP (ref 80–100)
METHOD TYPE: SIGNIFICANT CHANGE UP
ORGANISM # SPEC MICROSCOPIC CNT: SIGNIFICANT CHANGE UP
PHOSPHATE SERPL-MCNC: 2.9 MG/DL — SIGNIFICANT CHANGE UP (ref 2.5–4.5)
PLATELET # BLD AUTO: 297 K/UL — SIGNIFICANT CHANGE UP (ref 150–400)
POTASSIUM SERPL-MCNC: 3.9 MMOL/L — SIGNIFICANT CHANGE UP (ref 3.5–5.3)
POTASSIUM SERPL-SCNC: 3.9 MMOL/L — SIGNIFICANT CHANGE UP (ref 3.5–5.3)
PREALB SERPL-MCNC: 20 MG/DL — SIGNIFICANT CHANGE UP (ref 20–40)
RBC # BLD: 3.23 M/UL — LOW (ref 3.8–5.2)
RBC # FLD: 17.1 % — HIGH (ref 10.3–16.9)
SODIUM SERPL-SCNC: 138 MMOL/L — SIGNIFICANT CHANGE UP (ref 135–145)
SPECIMEN SOURCE: SIGNIFICANT CHANGE UP
WBC # BLD: 8.6 K/UL — SIGNIFICANT CHANGE UP (ref 3.8–10.5)
WBC # FLD AUTO: 8.6 K/UL — SIGNIFICANT CHANGE UP (ref 3.8–10.5)

## 2017-02-20 RX ORDER — ELECTROLYTE SOLUTION,INJ
1 VIAL (ML) INTRAVENOUS
Qty: 0 | Refills: 0 | Status: DISCONTINUED | OUTPATIENT
Start: 2017-02-20 | End: 2017-02-20

## 2017-02-20 RX ORDER — DIPHENHYDRAMINE HCL 50 MG
25 CAPSULE ORAL ONCE
Qty: 0 | Refills: 0 | Status: COMPLETED | OUTPATIENT
Start: 2017-02-20 | End: 2017-02-20

## 2017-02-20 RX ORDER — MAGNESIUM SULFATE 500 MG/ML
1 VIAL (ML) INJECTION ONCE
Qty: 0 | Refills: 0 | Status: COMPLETED | OUTPATIENT
Start: 2017-02-20 | End: 2017-02-20

## 2017-02-20 RX ADMIN — Medication 10 MILLIGRAM(S): at 00:00

## 2017-02-20 RX ADMIN — Medication 875 MILLIGRAM(S): at 18:37

## 2017-02-20 RX ADMIN — ENOXAPARIN SODIUM 60 MILLIGRAM(S): 100 INJECTION SUBCUTANEOUS at 12:01

## 2017-02-20 RX ADMIN — Medication 325 MILLIGRAM(S): at 09:39

## 2017-02-20 RX ADMIN — Medication 1 TABLET(S): at 12:02

## 2017-02-20 RX ADMIN — Medication 100 GRAM(S): at 09:40

## 2017-02-20 RX ADMIN — Medication 325 MILLIGRAM(S): at 12:02

## 2017-02-20 RX ADMIN — Medication 25 MILLIGRAM(S): at 10:56

## 2017-02-20 RX ADMIN — LOSARTAN POTASSIUM 100 MILLIGRAM(S): 100 TABLET, FILM COATED ORAL at 07:04

## 2017-02-20 RX ADMIN — PREGABALIN 250 MICROGRAM(S): 225 CAPSULE ORAL at 12:02

## 2017-02-20 RX ADMIN — Medication 875 MILLIGRAM(S): at 07:45

## 2017-02-20 RX ADMIN — Medication 1 EACH: at 18:40

## 2017-02-20 RX ADMIN — Medication 400 UNIT(S): at 12:03

## 2017-02-20 RX ADMIN — Medication 325 MILLIGRAM(S): at 10:35

## 2017-02-20 RX ADMIN — PANTOPRAZOLE SODIUM 40 MILLIGRAM(S): 20 TABLET, DELAYED RELEASE ORAL at 16:28

## 2017-02-20 RX ADMIN — ESCITALOPRAM OXALATE 20 MILLIGRAM(S): 10 TABLET, FILM COATED ORAL at 12:02

## 2017-02-20 NOTE — PROGRESS NOTE ADULT - SUBJECTIVE AND OBJECTIVE BOX
O/N: Afebrile, VSS, BRAYDEN  2/19: wounds repacked x2, BRAYDEN, VSS O/N: Afebrile, VSS, BRAYDEN  2/19: wounds repacked x2, BRAYDEN, VSS    SUBJECTIVE: Pt seen and examined at bedside. No overnight events.  Pain controlled. No f/c/n/v.    Vital Signs Last 24 Hrs  T(C): 36.1, Max: 37.1 (02-19 @ 20:45)  T(F): 97, Max: 98.8 (02-19 @ 20:45)  HR: 70 (70 - 87)  BP: 154/90 (138/85 - 159/89)  BP(mean): --  RR: 16 (16 - 17)  SpO2: 96% (92% - 96%)    PHYSICAL EXAM    Gen: NAD  Pulm: no respiratory distress  Abd: Soft, NT/ND, midline wound with draining EC fistula, periumbilical I&D site draining, repacked, flank I&D site draining pus, repacked, PEG tube in place  Ext: WWP    I&O's Detail  I & Os for 24h ending 20 Feb 2017 07:00  =============================================  IN:    Oral Fluid: 780 ml    Osmolite: 760 ml    PPN (Peripheral Parenteral Nutrition): 504 ml    Enteral Tube Flush: 210 ml    Total IN: 2254 ml  ---------------------------------------------  OUT:    Voided: 900 ml    Total OUT: 900 ml  ---------------------------------------------  Total NET: 1354 ml    I & Os for current day (as of 20 Feb 2017 09:10)  =============================================  IN:    Osmolite: 70 ml    PPN (Peripheral Parenteral Nutrition): 42 ml    Total IN: 112 ml  ---------------------------------------------  OUT:    Total OUT: 0 ml  ---------------------------------------------  Total NET: 112 ml      LABS:                        9.0    8.6   )-----------( 297      ( 20 Feb 2017 06:58 )             28.8     20 Feb 2017 06:58    138    |  106    |  27     ----------------------------<  87     3.9     |  24     |  0.48     Ca    9.5        20 Feb 2017 06:58  Phos  2.9       20 Feb 2017 06:58  Mg     1.9       20 Feb 2017 06:58            MEDICATIONS  (STANDING):  losartan 100milliGRAM(s) Oral daily  escitalopram 20milliGRAM(s) Oral daily  ALBUTerol    90 MICROgram(s) HFA Inhaler 1Puff(s) Inhalation every 4 hours  pantoprazole  Injectable 40milliGRAM(s) IV Push daily  cyanocobalamin 250MICROGram(s) Oral daily  cholecalciferol 400Unit(s) Oral daily  multivitamin 1Tablet(s) Oral daily  ferrous    sulfate 325milliGRAM(s) Oral daily  diphenhydrAMINE   Capsule 50milliGRAM(s) Oral once  enoxaparin Injectable 60milliGRAM(s) SubCutaneous daily  Parenteral Nutrition - Adult 1Each TPN Continuous <Continuous>  amoxicillin  120 mG/mL/clavulanate Suspension 875milliGRAM(s) Oral two times a day  magnesium sulfate  IVPB 1Gram(s) IV Intermittent once    MEDICATIONS  (PRN):  acetaminophen   Tablet. 325milliGRAM(s) Oral every 4 hours PRN Moderate Pain (4 - 6)  ondansetron Injectable 4milliGRAM(s) IV Push every 6 hours PRN Nausea  diazepam    Tablet 10milliGRAM(s) Oral at bedtime PRN Anxiety  diphenhydrAMINE   Capsule 25milliGRAM(s) Oral at bedtime PRN Insomnia  ALBUTerol    0.083% 2.5milliGRAM(s) Nebulizer every 6 hours PRN Shortness of Breath and/or Wheezing  oxybutynin 5milliGRAM(s) Oral two times a day PRN Bladder spasms  oxyCODONE  5 mG/acetaminophen 325 mG 1Tablet(s) Oral every 4 hours PRN Severe Pain (7 - 10)      RADIOLOGY & ADDITIONAL STUDIES:    ASSESSMENT AND PLAN

## 2017-02-20 NOTE — PROGRESS NOTE ADULT - ASSESSMENT
56yoF w/PMHx of HTN, gastric bypass in 2002 c/b stricture, corkscrewed sleeve and chronic leak, recently revised on 11/28/16 with protracted (70-day) postoperative course complicated by MDR infections, respiratory compromise 2/2 plugging/PNA/effusions requiring multiple interventions and a trach, as well as a continued leak requiring a draining double-pigtail with stenting performed by GI, admitted due to recurrent fevers likely due to abscess formation.    - reg diet, tube feeds, amino acids, vitamins  - Augmentin day 3  - Continue home medications  - DVTppx  - IS

## 2017-02-21 LAB
ANION GAP SERPL CALC-SCNC: 9 MMOL/L — SIGNIFICANT CHANGE UP (ref 9–16)
BUN SERPL-MCNC: 31 MG/DL — HIGH (ref 7–23)
CALCIUM SERPL-MCNC: 9.6 MG/DL — SIGNIFICANT CHANGE UP (ref 8.5–10.5)
CHLORIDE SERPL-SCNC: 107 MMOL/L — SIGNIFICANT CHANGE UP (ref 96–108)
CO2 SERPL-SCNC: 23 MMOL/L — SIGNIFICANT CHANGE UP (ref 22–31)
CREAT SERPL-MCNC: 0.55 MG/DL — SIGNIFICANT CHANGE UP (ref 0.5–1.3)
GLUCOSE SERPL-MCNC: 110 MG/DL — HIGH (ref 70–99)
HCT VFR BLD CALC: 28 % — LOW (ref 34.5–45)
HGB BLD-MCNC: 8.5 G/DL — LOW (ref 11.5–15.5)
MAGNESIUM SERPL-MCNC: 2.1 MG/DL — SIGNIFICANT CHANGE UP (ref 1.6–2.4)
MCHC RBC-ENTMCNC: 27.1 PG — SIGNIFICANT CHANGE UP (ref 27–34)
MCHC RBC-ENTMCNC: 30.4 G/DL — LOW (ref 32–36)
MCV RBC AUTO: 89.2 FL — SIGNIFICANT CHANGE UP (ref 80–100)
PHOSPHATE SERPL-MCNC: 2.6 MG/DL — SIGNIFICANT CHANGE UP (ref 2.5–4.5)
PLATELET # BLD AUTO: 298 K/UL — SIGNIFICANT CHANGE UP (ref 150–400)
POTASSIUM SERPL-MCNC: 3.8 MMOL/L — SIGNIFICANT CHANGE UP (ref 3.5–5.3)
POTASSIUM SERPL-SCNC: 3.8 MMOL/L — SIGNIFICANT CHANGE UP (ref 3.5–5.3)
RBC # BLD: 3.14 M/UL — LOW (ref 3.8–5.2)
RBC # FLD: 17.7 % — HIGH (ref 10.3–16.9)
SODIUM SERPL-SCNC: 139 MMOL/L — SIGNIFICANT CHANGE UP (ref 135–145)
WBC # BLD: 8.4 K/UL — SIGNIFICANT CHANGE UP (ref 3.8–10.5)
WBC # FLD AUTO: 8.4 K/UL — SIGNIFICANT CHANGE UP (ref 3.8–10.5)

## 2017-02-21 RX ORDER — ELECTROLYTE SOLUTION,INJ
1 VIAL (ML) INTRAVENOUS
Qty: 0 | Refills: 0 | Status: DISCONTINUED | OUTPATIENT
Start: 2017-02-21 | End: 2017-02-21

## 2017-02-21 RX ORDER — ELECTROLYTE SOLUTION,INJ
1 VIAL (ML) INTRAVENOUS
Qty: 0 | Refills: 0 | Status: DISCONTINUED | OUTPATIENT
Start: 2017-02-21 | End: 2017-02-22

## 2017-02-21 RX ADMIN — Medication 875 MILLIGRAM(S): at 17:19

## 2017-02-21 RX ADMIN — Medication 400 UNIT(S): at 11:38

## 2017-02-21 RX ADMIN — LOSARTAN POTASSIUM 100 MILLIGRAM(S): 100 TABLET, FILM COATED ORAL at 05:06

## 2017-02-21 RX ADMIN — PANTOPRAZOLE SODIUM 40 MILLIGRAM(S): 20 TABLET, DELAYED RELEASE ORAL at 11:38

## 2017-02-21 RX ADMIN — Medication 1 EACH: at 17:34

## 2017-02-21 RX ADMIN — PREGABALIN 250 MICROGRAM(S): 225 CAPSULE ORAL at 11:39

## 2017-02-21 RX ADMIN — Medication 10 MILLIGRAM(S): at 08:49

## 2017-02-21 RX ADMIN — Medication 875 MILLIGRAM(S): at 05:07

## 2017-02-21 RX ADMIN — Medication 325 MILLIGRAM(S): at 11:38

## 2017-02-21 RX ADMIN — ESCITALOPRAM OXALATE 20 MILLIGRAM(S): 10 TABLET, FILM COATED ORAL at 11:38

## 2017-02-21 RX ADMIN — ENOXAPARIN SODIUM 60 MILLIGRAM(S): 100 INJECTION SUBCUTANEOUS at 11:39

## 2017-02-21 RX ADMIN — Medication 1 TABLET(S): at 11:38

## 2017-02-21 RX ADMIN — Medication 25 MILLIGRAM(S): at 21:13

## 2017-02-21 NOTE — PROGRESS NOTE ADULT - SUBJECTIVE AND OBJECTIVE BOX
57 yo female with no complaints today.  Pt comfortable.  Tolerating PO.  Out of bed.  denies fever, chilsl, n/v chest pain, sob, abdominal pain, leg pain.                            8.5    8.4   )-----------( 298      ( 21 Feb 2017 07:22 )             28.0       21 Feb 2017 07:22    139    |  107    |  31     ----------------------------<  110    3.8     |  23     |  0.55     Ca    9.6        21 Feb 2017 07:22  Phos  2.6       21 Feb 2017 07:22  Mg     2.1       21 Feb 2017 07:22      T(C): 36.4, Max: 37.5 (02-20 @ 21:25)  HR: 82 (78 - 95)  BP: 146/86 (120/71 - 146/86)  RR: 16 (16 - 16)  SpO2: 96% (96% - 98%)  Wt(kg): --      gen: A&0x3, NAD  Heart: s1,s2 RRR,   Lung: CTA B/L  Abd: n/d, n/t, soft, incisions c/d/i, j-tube in place with no surrounding erythema  LE: no edema, b/l LE, no gerard b/l LE  skin: MMM    57 yo female with no complaints today, vss, tolerating po, feels she is getting stronger when working with PT    Plan:  PT  jtube feeds  purees  oob, scds, lovenox  augmentin  ppi  nausea control prn  pain control prn 55 yo female with no complaints today.  Pt comfortable.  Tolerating PO.  Out of bed.  denies fever, chilsl, n/v chest pain, sob, abdominal pain, leg pain.                            8.5    8.4   )-----------( 298      ( 21 Feb 2017 07:22 )             28.0       21 Feb 2017 07:22    139    |  107    |  31     ----------------------------<  110    3.8     |  23     |  0.55     Ca    9.6        21 Feb 2017 07:22  Phos  2.6       21 Feb 2017 07:22  Mg     2.1       21 Feb 2017 07:22      T(C): 36.4, Max: 37.5 (02-20 @ 21:25)  HR: 82 (78 - 95)  BP: 146/86 (120/71 - 146/86)  RR: 16 (16 - 16)  SpO2: 96% (96% - 98%)  Wt(kg): --      gen: A&0x3, NAD  Heart: s1,s2 RRR,   Lung: CTA B/L  Abd: n/d, n/t, soft, incisions c/d/i, j-tube in place with no surrounding erythema  LE: no edema, b/l LE, no gerard b/l LE  skin: MMM    55 yo female with superficial abdominal abscess i&d with no complaints today, vss, tolerating po, feels she is getting stronger when working with PT    Plan:  dressing change qd  PT  jtube feeds  purees  oob, scds, lovenox  augmentin  ppi  nausea control prn  pain control prn

## 2017-02-21 NOTE — PROGRESS NOTE ADULT - SUBJECTIVE AND OBJECTIVE BOX
O/N: Afebrile, VSS, BRAYDEN  2/20: wounds repacked x2, BRAYDEN O/N: Afebrile, VSS, BRAYDEN  2/20: wounds repacked x2, BRAYDEN    SUBJECTIVE: Pt seen and examined at bedside. No overnight events.  Pain controlled. No f/c/n/v. +BM/+Flatus    Vital Signs Last 24 Hrs  T(C): 36.1, Max: 37.5 (02-20 @ 21:25)  T(F): 97, Max: 99.5 (02-20 @ 21:25)  HR: 82 (82 - 95)  BP: 120/71 (120/71 - 139/78)  BP(mean): --  RR: 16 (16 - 17)  SpO2: 97% (93% - 98%)    PHYSICAL EXAM    Gen: NAD  Pulm: no respiratory distress  Abd: Soft, NT/ND  Ext: WWP    I&O's Detail    I & Os for current day (as of 21 Feb 2017 07:17)  =============================================  IN:    Osmolite: 1260 ml    Oral Fluid: 1160 ml    PPN (Peripheral Parenteral Nutrition): 756 ml    IV PiggyBack: 100 ml    Enteral Tube Flush: 80 ml    Total IN: 3356 ml  ---------------------------------------------  OUT:    Voided: 2300 ml    Total OUT: 2300 ml  ---------------------------------------------  Total NET: 1056 ml      LABS:                        9.0    8.6   )-----------( 297      ( 20 Feb 2017 06:58 )             28.8     20 Feb 2017 06:58    138    |  106    |  27     ----------------------------<  87     3.9     |  24     |  0.48     Ca    9.5        20 Feb 2017 06:58  Phos  2.9       20 Feb 2017 06:58  Mg     1.9       20 Feb 2017 06:58            MEDICATIONS  (STANDING):  losartan 100milliGRAM(s) Oral daily  escitalopram 20milliGRAM(s) Oral daily  ALBUTerol    90 MICROgram(s) HFA Inhaler 1Puff(s) Inhalation every 4 hours  pantoprazole  Injectable 40milliGRAM(s) IV Push daily  cyanocobalamin 250MICROGram(s) Oral daily  cholecalciferol 400Unit(s) Oral daily  multivitamin 1Tablet(s) Oral daily  ferrous    sulfate 325milliGRAM(s) Oral daily  diphenhydrAMINE   Capsule 50milliGRAM(s) Oral once  enoxaparin Injectable 60milliGRAM(s) SubCutaneous daily  amoxicillin  120 mG/mL/clavulanate Suspension 875milliGRAM(s) Oral two times a day  Parenteral Nutrition - Adult 1Each TPN Continuous <Continuous>  bisacodyl Suppository 10milliGRAM(s) Rectal once    MEDICATIONS  (PRN):  acetaminophen   Tablet. 325milliGRAM(s) Oral every 4 hours PRN Moderate Pain (4 - 6)  ondansetron Injectable 4milliGRAM(s) IV Push every 6 hours PRN Nausea  diazepam    Tablet 10milliGRAM(s) Oral at bedtime PRN Anxiety  diphenhydrAMINE   Capsule 25milliGRAM(s) Oral at bedtime PRN Insomnia  ALBUTerol    0.083% 2.5milliGRAM(s) Nebulizer every 6 hours PRN Shortness of Breath and/or Wheezing  oxybutynin 5milliGRAM(s) Oral two times a day PRN Bladder spasms  oxyCODONE  5 mG/acetaminophen 325 mG 1Tablet(s) Oral every 4 hours PRN Severe Pain (7 - 10)      RADIOLOGY & ADDITIONAL STUDIES:    ASSESSMENT AND PLAN

## 2017-02-22 LAB
ANION GAP SERPL CALC-SCNC: 9 MMOL/L — SIGNIFICANT CHANGE UP (ref 9–16)
BUN SERPL-MCNC: 34 MG/DL — HIGH (ref 7–23)
CALCIUM SERPL-MCNC: 10 MG/DL — SIGNIFICANT CHANGE UP (ref 8.5–10.5)
CHLORIDE SERPL-SCNC: 105 MMOL/L — SIGNIFICANT CHANGE UP (ref 96–108)
CO2 SERPL-SCNC: 24 MMOL/L — SIGNIFICANT CHANGE UP (ref 22–31)
CREAT SERPL-MCNC: 0.47 MG/DL — LOW (ref 0.5–1.3)
GLUCOSE SERPL-MCNC: 96 MG/DL — SIGNIFICANT CHANGE UP (ref 70–99)
HCT VFR BLD CALC: 28.6 % — LOW (ref 34.5–45)
HGB BLD-MCNC: 8.6 G/DL — LOW (ref 11.5–15.5)
MAGNESIUM SERPL-MCNC: 2 MG/DL — SIGNIFICANT CHANGE UP (ref 1.6–2.4)
MCHC RBC-ENTMCNC: 27 PG — SIGNIFICANT CHANGE UP (ref 27–34)
MCHC RBC-ENTMCNC: 30.1 G/DL — LOW (ref 32–36)
MCV RBC AUTO: 89.7 FL — SIGNIFICANT CHANGE UP (ref 80–100)
PHOSPHATE SERPL-MCNC: 3.1 MG/DL — SIGNIFICANT CHANGE UP (ref 2.5–4.5)
PLATELET # BLD AUTO: 305 K/UL — SIGNIFICANT CHANGE UP (ref 150–400)
POTASSIUM SERPL-MCNC: 4.1 MMOL/L — SIGNIFICANT CHANGE UP (ref 3.5–5.3)
POTASSIUM SERPL-SCNC: 4.1 MMOL/L — SIGNIFICANT CHANGE UP (ref 3.5–5.3)
RBC # BLD: 3.19 M/UL — LOW (ref 3.8–5.2)
RBC # FLD: 17.9 % — HIGH (ref 10.3–16.9)
SODIUM SERPL-SCNC: 138 MMOL/L — SIGNIFICANT CHANGE UP (ref 135–145)
WBC # BLD: 9.2 K/UL — SIGNIFICANT CHANGE UP (ref 3.8–10.5)
WBC # FLD AUTO: 9.2 K/UL — SIGNIFICANT CHANGE UP (ref 3.8–10.5)

## 2017-02-22 PROCEDURE — 99222 1ST HOSP IP/OBS MODERATE 55: CPT | Mod: 24

## 2017-02-22 RX ADMIN — Medication 10 MILLIGRAM(S): at 08:46

## 2017-02-22 RX ADMIN — LOSARTAN POTASSIUM 100 MILLIGRAM(S): 100 TABLET, FILM COATED ORAL at 06:57

## 2017-02-22 RX ADMIN — ESCITALOPRAM OXALATE 20 MILLIGRAM(S): 10 TABLET, FILM COATED ORAL at 12:50

## 2017-02-22 RX ADMIN — Medication 325 MILLIGRAM(S): at 12:50

## 2017-02-22 RX ADMIN — Medication 400 UNIT(S): at 12:50

## 2017-02-22 RX ADMIN — Medication 1 TABLET(S): at 12:50

## 2017-02-22 RX ADMIN — PREGABALIN 250 MICROGRAM(S): 225 CAPSULE ORAL at 12:50

## 2017-02-22 RX ADMIN — Medication 875 MILLIGRAM(S): at 06:57

## 2017-02-22 RX ADMIN — Medication 25 MILLIGRAM(S): at 16:23

## 2017-02-22 RX ADMIN — PANTOPRAZOLE SODIUM 40 MILLIGRAM(S): 20 TABLET, DELAYED RELEASE ORAL at 12:47

## 2017-02-22 RX ADMIN — Medication 875 MILLIGRAM(S): at 22:26

## 2017-02-22 RX ADMIN — ENOXAPARIN SODIUM 60 MILLIGRAM(S): 100 INJECTION SUBCUTANEOUS at 12:48

## 2017-02-22 NOTE — PROGRESS NOTE ADULT - ASSESSMENT
56yoF w/PMHx of HTN, gastric bypass in 2002 c/b stricture, corkscrewed sleeve and chronic leak, recently revised on 11/28/16 with protracted (70-day) postoperative course complicated by MDR infections, respiratory compromise 2/2 plugging/PNA/effusions requiring multiple interventions and a trach, as well as a continued leak requiring a draining double-pigtail with stenting performed by GI, admitted due to recurrent fevers likely due to abscess formation.    - reg diet, tube feeds, amino acids, vitamins  - Augmentin day   - Continue home medications  - DVTppx  - IS  - Dressing changes BID 56yoF w/PMHx of HTN, gastric bypass in 2002 c/b stricture, corkscrewed sleeve and chronic leak, recently revised on 11/28/16 with protracted (70-day) postoperative course complicated by MDR infections, respiratory compromise 2/2 plugging/PNA/effusions requiring multiple interventions and a trach, as well as a continued leak requiring a draining double-pigtail with stenting performed by GI, admitted due to recurrent fevers likely due to abscess formation.    - reg diet  - J tube removal  - Discontinue TPN   , amino acids, vitamins  - Augmentin day   - Continue home medications  - DVTppx  - IS  - Dressing changes BID

## 2017-02-22 NOTE — PROGRESS NOTE ADULT - SUBJECTIVE AND OBJECTIVE BOX
pt still draining from multiple abd wall sites c/w tube feeds? (-) bile  wound cx: (+) enterococcus and Staph  PE: 5 I&D sites draining thick yellow fluid  central abd wond 15 x 8 with good gran tissue    A/P. Opened up 2 sites and packed them w 1/4" iodoform  2. Dr Brady and GS team d/c feeding tube  3. pt refusing any further surgeries at this time   4. cont abx per wound cx and LWC w QD packing

## 2017-02-22 NOTE — PROGRESS NOTE ADULT - SUBJECTIVE AND OBJECTIVE BOX
O/N: Wound packing changed. Afebrile, VSS, BRAYDEN  2/21: Wound packing changed, rehab placement started O/N: Wound packing changed. Afebrile, VSS, BRAYDEN  2/21: Wound packing changed, rehab placement started        SUBJECTIVE: Patient examined bedside. Patient reports pain improved and tolerating diey  Flatus: [X] YES [] NO             Bowel Movement: [X ] YES [ ] NO  Pain (0-10):            Pain Control Adequate: [X ] YES [ ] NO  Nausea: [ ] YES [X ] NO            Vomiting: [ ] YES [X ] NO  Diarrhea: [ ] YES [X ] NO         Constipation: [ ] YES [X ] NO     Chest Pain: [ ] YES [X ] NO    SOB:  [ ] YES [ X] NO    losartan 100milliGRAM(s) Oral daily  enoxaparin Injectable 60milliGRAM(s) SubCutaneous daily  amoxicillin  120 mG/mL/clavulanate Suspension 875milliGRAM(s) Oral two times a day      Vital Signs Last 24 Hrs  T(C): 36.4, Max: 37.1 (02-21 @ 14:19)  T(F): 97.6, Max: 98.8 (02-21 @ 14:19)  HR: 74 (74 - 85)  BP: 134/85 (131/88 - 146/86)  BP(mean): --  RR: 16 (16 - 16)  SpO2: 95% (94% - 96%)  I&O's Detail  I & Os for 24h ending 22 Feb 2017 07:00  =============================================  IN:    Osmolite: 1260 ml    PPN (Peripheral Parenteral Nutrition): 966 ml    Oral Fluid: 550 ml    Enteral Tube Flush: 140 ml    Total IN: 2916 ml  ---------------------------------------------  OUT:    Voided: 650 ml    Total OUT: 650 ml  ---------------------------------------------  Total NET: 2266 ml    I & Os for current day (as of 22 Feb 2017 09:58)  =============================================  IN:    PPN (Peripheral Parenteral Nutrition): 42 ml    Total IN: 42 ml  ---------------------------------------------  OUT:    Total OUT: 0 ml  ---------------------------------------------  Total NET: 42 ml      General: NAD, resting comfortably in bed  C/V: NSR  Pulm: Nonlabored breathing, no respiratory distress  Abd: soft,  midline wound granulating tissue , purulent material coming out of the fistula, 2 incision and drainage  sites packed with iodoform dressing  granulating tissue observed.  Extrem: WWP, no edema, SCDs in place        LABS:                        8.6    9.2   )-----------( 305      ( 22 Feb 2017 06:44 )             28.6     22 Feb 2017 06:44    138    |  105    |  34     ----------------------------<  96     4.1     |  24     |  0.47     Ca    10.0       22 Feb 2017 06:44  Phos  3.1       22 Feb 2017 06:44  Mg     2.0       22 Feb 2017 06:44            RADIOLOGY & ADDITIONAL STUDIES:

## 2017-02-23 LAB
ANION GAP SERPL CALC-SCNC: 8 MMOL/L — LOW (ref 9–16)
BUN SERPL-MCNC: 32 MG/DL — HIGH (ref 7–23)
CALCIUM SERPL-MCNC: 10.1 MG/DL — SIGNIFICANT CHANGE UP (ref 8.5–10.5)
CHLORIDE SERPL-SCNC: 105 MMOL/L — SIGNIFICANT CHANGE UP (ref 96–108)
CO2 SERPL-SCNC: 27 MMOL/L — SIGNIFICANT CHANGE UP (ref 22–31)
CREAT SERPL-MCNC: 0.62 MG/DL — SIGNIFICANT CHANGE UP (ref 0.5–1.3)
GLUCOSE SERPL-MCNC: 84 MG/DL — SIGNIFICANT CHANGE UP (ref 70–99)
HCT VFR BLD CALC: 29.7 % — LOW (ref 34.5–45)
HGB BLD-MCNC: 9 G/DL — LOW (ref 11.5–15.5)
MAGNESIUM SERPL-MCNC: 2.1 MG/DL — SIGNIFICANT CHANGE UP (ref 1.6–2.4)
MCHC RBC-ENTMCNC: 27.4 PG — SIGNIFICANT CHANGE UP (ref 27–34)
MCHC RBC-ENTMCNC: 30.3 G/DL — LOW (ref 32–36)
MCV RBC AUTO: 90.5 FL — SIGNIFICANT CHANGE UP (ref 80–100)
PHOSPHATE SERPL-MCNC: 3.9 MG/DL — SIGNIFICANT CHANGE UP (ref 2.5–4.5)
PLATELET # BLD AUTO: 293 K/UL — SIGNIFICANT CHANGE UP (ref 150–400)
POTASSIUM SERPL-MCNC: 4.3 MMOL/L — SIGNIFICANT CHANGE UP (ref 3.5–5.3)
POTASSIUM SERPL-SCNC: 4.3 MMOL/L — SIGNIFICANT CHANGE UP (ref 3.5–5.3)
RBC # BLD: 3.28 M/UL — LOW (ref 3.8–5.2)
RBC # FLD: 18.3 % — HIGH (ref 10.3–16.9)
SODIUM SERPL-SCNC: 140 MMOL/L — SIGNIFICANT CHANGE UP (ref 135–145)
WBC # BLD: 7.5 K/UL — SIGNIFICANT CHANGE UP (ref 3.8–10.5)
WBC # FLD AUTO: 7.5 K/UL — SIGNIFICANT CHANGE UP (ref 3.8–10.5)

## 2017-02-23 RX ORDER — DIPHENHYDRAMINE HCL 50 MG
25 CAPSULE ORAL EVERY 6 HOURS
Qty: 0 | Refills: 0 | Status: DISCONTINUED | OUTPATIENT
Start: 2017-02-23 | End: 2017-02-23

## 2017-02-23 RX ORDER — DIPHENHYDRAMINE HCL 50 MG
25 CAPSULE ORAL EVERY 6 HOURS
Qty: 0 | Refills: 0 | Status: DISCONTINUED | OUTPATIENT
Start: 2017-02-23 | End: 2017-03-05

## 2017-02-23 RX ORDER — DIAZEPAM 5 MG
10 TABLET ORAL AT BEDTIME
Qty: 0 | Refills: 0 | Status: DISCONTINUED | OUTPATIENT
Start: 2017-02-23 | End: 2017-03-02

## 2017-02-23 RX ADMIN — Medication 400 UNIT(S): at 11:55

## 2017-02-23 RX ADMIN — Medication 50 MILLIGRAM(S): at 11:54

## 2017-02-23 RX ADMIN — Medication 875 MILLIGRAM(S): at 17:46

## 2017-02-23 RX ADMIN — Medication 325 MILLIGRAM(S): at 11:55

## 2017-02-23 RX ADMIN — Medication 25 MILLIGRAM(S): at 21:06

## 2017-02-23 RX ADMIN — Medication 1 TABLET(S): at 11:55

## 2017-02-23 RX ADMIN — ENOXAPARIN SODIUM 60 MILLIGRAM(S): 100 INJECTION SUBCUTANEOUS at 11:55

## 2017-02-23 RX ADMIN — PREGABALIN 250 MICROGRAM(S): 225 CAPSULE ORAL at 11:55

## 2017-02-23 RX ADMIN — ESCITALOPRAM OXALATE 20 MILLIGRAM(S): 10 TABLET, FILM COATED ORAL at 11:55

## 2017-02-23 RX ADMIN — Medication 875 MILLIGRAM(S): at 07:40

## 2017-02-23 RX ADMIN — PANTOPRAZOLE SODIUM 40 MILLIGRAM(S): 20 TABLET, DELAYED RELEASE ORAL at 11:54

## 2017-02-23 RX ADMIN — LOSARTAN POTASSIUM 100 MILLIGRAM(S): 100 TABLET, FILM COATED ORAL at 07:39

## 2017-02-23 NOTE — PROGRESS NOTE ADULT - SUBJECTIVE AND OBJECTIVE BOX
O/N; dressing changed  2/22: Dressing changed, J tube removed ,  TPN  stopped , VSS      SUBJECTIVE: Pt seen and examined at bedside. No overnight events.  Pain controlled.     Vital Signs Last 24 Hrs  T(C): 35.9, Max: 37.2 (02-22 @ 17:49)  T(F): 96.6, Max: 99 (02-22 @ 17:49)  HR: 64 (64 - 86)  BP: 113/74 (113/74 - 139/76)  BP(mean): --  RR: 16 (16 - 18)  SpO2: 97% (94% - 97%)    PHYSICAL EXAM    General: NAD, resting comfortably in bed  C/V: NSR  Pulm: Nonlabored breathing, no respiratory distress  Abd: soft,  midline wound granulating tissue , purulent material coming out of the fistula, 5 incision and drainage  sites packed with iodoform dressing  granulating tissue observed.  Extrem: WWP, no edema, SCDs in place    I&O's Detail    I & Os for current day (as of 23 Feb 2017 08:05)  =============================================  IN:    Oral Fluid: 1160 ml    PPN (Peripheral Parenteral Nutrition): 42 ml    Total IN: 1202 ml  ---------------------------------------------  OUT:    Voided: 300 ml    Total OUT: 300 ml  ---------------------------------------------  Total NET: 902 ml      LABS:                        9.0    7.5   )-----------( 293      ( 23 Feb 2017 06:28 )             29.7     23 Feb 2017 06:32    140    |  105    |  32     ----------------------------<  84     4.3     |  27     |  0.62     Ca    10.1       23 Feb 2017 06:32  Phos  3.9       23 Feb 2017 06:32  Mg     2.1       23 Feb 2017 06:32            MEDICATIONS  (STANDING):  losartan 100milliGRAM(s) Oral daily  escitalopram 20milliGRAM(s) Oral daily  ALBUTerol    90 MICROgram(s) HFA Inhaler 1Puff(s) Inhalation every 4 hours  pantoprazole  Injectable 40milliGRAM(s) IV Push daily  cyanocobalamin 250MICROGram(s) Oral daily  cholecalciferol 400Unit(s) Oral daily  multivitamin 1Tablet(s) Oral daily  ferrous    sulfate 325milliGRAM(s) Oral daily  diphenhydrAMINE   Capsule 50milliGRAM(s) Oral once  enoxaparin Injectable 60milliGRAM(s) SubCutaneous daily  amoxicillin  120 mG/mL/clavulanate Suspension 875milliGRAM(s) Oral two times a day    MEDICATIONS  (PRN):  acetaminophen   Tablet. 325milliGRAM(s) Oral every 4 hours PRN Moderate Pain (4 - 6)  ondansetron Injectable 4milliGRAM(s) IV Push every 6 hours PRN Nausea  diphenhydrAMINE   Capsule 25milliGRAM(s) Oral at bedtime PRN Insomnia  ALBUTerol    0.083% 2.5milliGRAM(s) Nebulizer every 6 hours PRN Shortness of Breath and/or Wheezing  oxybutynin 5milliGRAM(s) Oral two times a day PRN Bladder spasms  oxyCODONE  5 mG/acetaminophen 325 mG 1Tablet(s) Oral every 4 hours PRN Severe Pain (7 - 10)      RADIOLOGY & ADDITIONAL STUDIES:    ASSESSMENT AND PLAN

## 2017-02-23 NOTE — PROGRESS NOTE ADULT - ASSESSMENT
56yoF w/PMHx of HTN, gastric bypass in 2002 c/b stricture, corkscrewed sleeve and chronic leak, recently revised on 11/28/16 with protracted (70-day) postoperative course complicated by MDR infections, respiratory compromise 2/2 plugging/PNA/effusions requiring multiple interventions and a trach, as well as a continued leak requiring a draining double-pigtail with stenting performed by GI, admitted due to recurrent fevers likely due to abscess formation.    - reg diet  - J tube removal  - Discontinue TPN   , amino acids, vitamins  - Augmentin day   - Continue home medications  - DVTppx  - IS  - Dressing changes BID

## 2017-02-23 NOTE — PROGRESS NOTE ADULT - SUBJECTIVE AND OBJECTIVE BOX
57 yo female multiple bariatric surgery revisions and leaks readmitted for superficial abdominal abscesses.  No complaints today.  States eating well and getting stronger with PT.  Having normal BM, soft.  Denies fever, chilsl, n/v, chest pain, sob, abdominal pain, leg pain, lightheadedness, dizziness.                            9.0    7.5   )-----------( 293      ( 23 Feb 2017 06:28 )             29.7       23 Feb 2017 06:32    140    |  105    |  32     ----------------------------<  84     4.3     |  27     |  0.62     Ca    10.1       23 Feb 2017 06:32  Phos  3.9       23 Feb 2017 06:32  Mg     2.1       23 Feb 2017 06:32      T(C): 36.2, Max: 37.2 (02-22 @ 17:49)  HR: 73 (64 - 86)  BP: 129/84 (113/74 - 139/76)  RR: 18 (16 - 18)  SpO2: 98% (94% - 98%)  Wt(kg): --    gen: A&0x3, NAD  Heart: s1,s2 RRR,   Lung: CTA B/L  Abd: n/d, n/t, soft, incisions c/d/i, no surrounding erythema, no drainage  LE: no edema, b/l LE, no gerard b/l LE  skin: MMM    57 yo female s/p multiple bariatric surgeries and leaks readmitted for superficial abdominal abscesses, i&dd healing well, vss, labs wnl      Plan:  PT  daily dressing change  oob, scds, lovenox  ppi  nausea control prn  pain control prn  bariatric regular diet

## 2017-02-24 LAB
ANION GAP SERPL CALC-SCNC: 6 MMOL/L — LOW (ref 9–16)
BUN SERPL-MCNC: 28 MG/DL — HIGH (ref 7–23)
CALCIUM SERPL-MCNC: 10.4 MG/DL — SIGNIFICANT CHANGE UP (ref 8.5–10.5)
CHLORIDE SERPL-SCNC: 106 MMOL/L — SIGNIFICANT CHANGE UP (ref 96–108)
CO2 SERPL-SCNC: 28 MMOL/L — SIGNIFICANT CHANGE UP (ref 22–31)
CREAT SERPL-MCNC: 0.68 MG/DL — SIGNIFICANT CHANGE UP (ref 0.5–1.3)
GLUCOSE SERPL-MCNC: 87 MG/DL — SIGNIFICANT CHANGE UP (ref 70–99)
HCT VFR BLD CALC: 31.5 % — LOW (ref 34.5–45)
HGB BLD-MCNC: 9.6 G/DL — LOW (ref 11.5–15.5)
MAGNESIUM SERPL-MCNC: 2.1 MG/DL — SIGNIFICANT CHANGE UP (ref 1.6–2.4)
MCHC RBC-ENTMCNC: 27.6 PG — SIGNIFICANT CHANGE UP (ref 27–34)
MCHC RBC-ENTMCNC: 30.5 G/DL — LOW (ref 32–36)
MCV RBC AUTO: 90.5 FL — SIGNIFICANT CHANGE UP (ref 80–100)
PHOSPHATE SERPL-MCNC: 3.8 MG/DL — SIGNIFICANT CHANGE UP (ref 2.5–4.5)
PLATELET # BLD AUTO: 321 K/UL — SIGNIFICANT CHANGE UP (ref 150–400)
POTASSIUM SERPL-MCNC: 4.6 MMOL/L — SIGNIFICANT CHANGE UP (ref 3.5–5.3)
POTASSIUM SERPL-SCNC: 4.6 MMOL/L — SIGNIFICANT CHANGE UP (ref 3.5–5.3)
RBC # BLD: 3.48 M/UL — LOW (ref 3.8–5.2)
RBC # FLD: 18.1 % — HIGH (ref 10.3–16.9)
SODIUM SERPL-SCNC: 140 MMOL/L — SIGNIFICANT CHANGE UP (ref 135–145)
WBC # BLD: 7.1 K/UL — SIGNIFICANT CHANGE UP (ref 3.8–10.5)
WBC # FLD AUTO: 7.1 K/UL — SIGNIFICANT CHANGE UP (ref 3.8–10.5)

## 2017-02-24 RX ORDER — DIPHENHYDRAMINE HCL 50 MG
25 CAPSULE ORAL ONCE
Qty: 0 | Refills: 0 | Status: COMPLETED | OUTPATIENT
Start: 2017-02-24 | End: 2017-02-24

## 2017-02-24 RX ADMIN — ENOXAPARIN SODIUM 60 MILLIGRAM(S): 100 INJECTION SUBCUTANEOUS at 12:08

## 2017-02-24 RX ADMIN — Medication 25 MILLIGRAM(S): at 17:24

## 2017-02-24 RX ADMIN — Medication 400 UNIT(S): at 12:08

## 2017-02-24 RX ADMIN — Medication 325 MILLIGRAM(S): at 12:08

## 2017-02-24 RX ADMIN — Medication 25 MILLIGRAM(S): at 12:07

## 2017-02-24 RX ADMIN — PREGABALIN 250 MICROGRAM(S): 225 CAPSULE ORAL at 12:08

## 2017-02-24 RX ADMIN — PANTOPRAZOLE SODIUM 40 MILLIGRAM(S): 20 TABLET, DELAYED RELEASE ORAL at 12:07

## 2017-02-24 RX ADMIN — LOSARTAN POTASSIUM 100 MILLIGRAM(S): 100 TABLET, FILM COATED ORAL at 07:10

## 2017-02-24 RX ADMIN — Medication 1 TABLET(S): at 12:08

## 2017-02-24 RX ADMIN — ESCITALOPRAM OXALATE 20 MILLIGRAM(S): 10 TABLET, FILM COATED ORAL at 12:08

## 2017-02-24 NOTE — PROGRESS NOTE ADULT - SUBJECTIVE AND OBJECTIVE BOX
57 yo female s/p multiple bariatric procedures for morbid obesity complicated by leaks and multiple repairs readmitted for superficial abdominal abscesses and fever.  Pt with no complaints today.  States she is gettign stronger.  Eating well. Having normal bm, soft.   Denies fever, chills, n/v, chest pain, sob, abdominal pain, leg pain, lightheadedness.                          9.6    7.1   )-----------( 321      ( 24 Feb 2017 07:14 )             31.5     24 Feb 2017 07:13    140    |  106    |  28     ----------------------------<  87     4.6     |  28     |  0.68     Ca    10.4       24 Feb 2017 07:13  Phos  3.8       24 Feb 2017 07:13  Mg     2.1       24 Feb 2017 07:13    T(C): 36.3, Max: 37.2 (02-23 @ 20:21)  HR: 80 (67 - 80)  BP: 124/- (116/78 - 135/81)  RR: 16 (16 - 17)  SpO2: 95% (93% - 95%)  Wt(kg): --    gen: A&0x3, NAD  Heart: s1,s2 RRR,   Lung: CTA B/L  Abd: n/d, n/t, soft, incisions c/d/i, no drainage and minimal surrounding erythema  LE: no edema, b/l LE, no gerard b/l LE  skin: MMM    57 yo female s/p multiple bariatric revisions s/p leaks readmitted for superficial abdominal abscesses and fever, vss, doing well, labs stabe    Plan:  oob, scds, lovenox  daily dressing change  augmentin d/cd  PT  regular bariatric diet  ppi  nausea control prn  pain control prn  counseled extensively on nutrition and protein requirements to helpw ith wound healing- goal  grams of protein a day, fiber in diet, minimize carbs

## 2017-02-24 NOTE — PROGRESS NOTE ADULT - SUBJECTIVE AND OBJECTIVE BOX
O/N: abdominal wounds cleaned and dressings changed. Afebrile, VSS  2/23: BRAYDEN, wound repacked O/N: abdominal wounds cleaned and dressings changed. Afebrile, VSS  2/23: BRAYDEN, wound repacked    SUBJECTIVE: Pt seen and examined at bedside. No overnight events.  Pain controlled. No f/c/n/v.     Vital Signs Last 24 Hrs  T(C): 36.9, Max: 37.2 (02-23 @ 20:21)  T(F): 98.5, Max: 98.9 (02-23 @ 20:21)  HR: 67 (67 - 84)  BP: 124/88 (116/78 - 135/81)  BP(mean): --  RR: 17 (16 - 18)  SpO2: 95% (93% - 98%)    PHYSICAL EXAM    General: NAD, resting comfortably in bed  C/V: NSR  Pulm: Nonlabored breathing, no respiratory distress  Abd: soft,  midline wound granulating tissue , purulent material coming out of the fistula, 5 incision and drainage  sites packed with plain dressing  granulating tissue observed.  Extrem: WWP, no edema, SCDs in place    I&O's Detail    I & Os for current day (as of 24 Feb 2017 07:44)  =============================================  IN:    Oral Fluid: 520 ml    Total IN: 520 ml  ---------------------------------------------  OUT:    Voided: 1600 ml    Total OUT: 1600 ml  ---------------------------------------------  Total NET: -1080 ml      LABS:                        9.6    7.1   )-----------( 321      ( 24 Feb 2017 07:14 )             31.5     24 Feb 2017 07:13    140    |  106    |  28     ----------------------------<  87     4.6     |  28     |  0.68     Ca    10.4       24 Feb 2017 07:13  Phos  3.8       24 Feb 2017 07:13  Mg     2.1       24 Feb 2017 07:13            MEDICATIONS  (STANDING):  losartan 100milliGRAM(s) Oral daily  escitalopram 20milliGRAM(s) Oral daily  ALBUTerol    90 MICROgram(s) HFA Inhaler 1Puff(s) Inhalation every 4 hours  pantoprazole  Injectable 40milliGRAM(s) IV Push daily  cyanocobalamin 250MICROGram(s) Oral daily  cholecalciferol 400Unit(s) Oral daily  multivitamin 1Tablet(s) Oral daily  ferrous    sulfate 325milliGRAM(s) Oral daily  enoxaparin Injectable 60milliGRAM(s) SubCutaneous daily    MEDICATIONS  (PRN):  acetaminophen   Tablet. 325milliGRAM(s) Oral every 4 hours PRN Moderate Pain (4 - 6)  ondansetron Injectable 4milliGRAM(s) IV Push every 6 hours PRN Nausea  ALBUTerol    0.083% 2.5milliGRAM(s) Nebulizer every 6 hours PRN Shortness of Breath and/or Wheezing  oxybutynin 5milliGRAM(s) Oral two times a day PRN Bladder spasms  diazepam    Tablet 10milliGRAM(s) Oral at bedtime PRN insomnia  diphenhydrAMINE   Capsule 25milliGRAM(s) Oral every 6 hours PRN Rash and/or Itching  oxyCODONE  5 mG/acetaminophen 325 mG 1Tablet(s) Oral every 4 hours PRN Severe Pain (7 - 10)      RADIOLOGY & ADDITIONAL STUDIES:    ASSESSMENT AND PLAN

## 2017-02-24 NOTE — PROGRESS NOTE ADULT - ASSESSMENT
56yoF w/PMHx of HTN, gastric bypass in 2002 c/b stricture, corkscrewed sleeve and chronic leak, recently revised on 11/28/16 with protracted (70-day) postoperative course complicated by MDR infections, respiratory compromise 2/2 plugging/PNA/effusions requiring multiple interventions and a trach, as well as a continued leak requiring a draining double-pigtail with stenting performed by GI, admitted due to recurrent fevers likely due to abscess formation.    - reg diet  - amino acids, vitamins  - Augmentin   - Continue home medications  - DVTppx  - IS  - Dressing changes BID 56yoF w/PMHx of HTN, gastric bypass in 2002 c/b stricture, corkscrewed sleeve and chronic leak, recently revised on 11/28/16 with protracted (70-day) postoperative course complicated by MDR infections, respiratory compromise 2/2 plugging/PNA/effusions requiring multiple interventions and a trach, as well as a continued leak requiring a draining double-pigtail with stenting performed by GI, admitted due to recurrent fevers likely due to abscess formation.    - reg diet  - amino acids, vitamins  - Augmentin d/c'd  - Continue home medications  - DVTppx  - IS  - Dressing changes BID

## 2017-02-25 LAB
ANION GAP SERPL CALC-SCNC: 8 MMOL/L — LOW (ref 9–16)
BUN SERPL-MCNC: 28 MG/DL — HIGH (ref 7–23)
CALCIUM SERPL-MCNC: 10.5 MG/DL — SIGNIFICANT CHANGE UP (ref 8.5–10.5)
CHLORIDE SERPL-SCNC: 103 MMOL/L — SIGNIFICANT CHANGE UP (ref 96–108)
CO2 SERPL-SCNC: 29 MMOL/L — SIGNIFICANT CHANGE UP (ref 22–31)
CREAT SERPL-MCNC: 0.76 MG/DL — SIGNIFICANT CHANGE UP (ref 0.5–1.3)
GLUCOSE SERPL-MCNC: 80 MG/DL — SIGNIFICANT CHANGE UP (ref 70–99)
HCT VFR BLD CALC: 32.2 % — LOW (ref 34.5–45)
HGB BLD-MCNC: 9.7 G/DL — LOW (ref 11.5–15.5)
MAGNESIUM SERPL-MCNC: 2.2 MG/DL — SIGNIFICANT CHANGE UP (ref 1.6–2.4)
MCHC RBC-ENTMCNC: 27.6 PG — SIGNIFICANT CHANGE UP (ref 27–34)
MCHC RBC-ENTMCNC: 30.1 G/DL — LOW (ref 32–36)
MCV RBC AUTO: 91.5 FL — SIGNIFICANT CHANGE UP (ref 80–100)
PHOSPHATE SERPL-MCNC: 3.6 MG/DL — SIGNIFICANT CHANGE UP (ref 2.5–4.5)
PLATELET # BLD AUTO: 322 K/UL — SIGNIFICANT CHANGE UP (ref 150–400)
POTASSIUM SERPL-MCNC: 4.5 MMOL/L — SIGNIFICANT CHANGE UP (ref 3.5–5.3)
POTASSIUM SERPL-SCNC: 4.5 MMOL/L — SIGNIFICANT CHANGE UP (ref 3.5–5.3)
RBC # BLD: 3.52 M/UL — LOW (ref 3.8–5.2)
RBC # FLD: 17.8 % — HIGH (ref 10.3–16.9)
SODIUM SERPL-SCNC: 140 MMOL/L — SIGNIFICANT CHANGE UP (ref 135–145)
WBC # BLD: 6.5 K/UL — SIGNIFICANT CHANGE UP (ref 3.8–10.5)
WBC # FLD AUTO: 6.5 K/UL — SIGNIFICANT CHANGE UP (ref 3.8–10.5)

## 2017-02-25 RX ADMIN — Medication 1 TABLET(S): at 12:16

## 2017-02-25 RX ADMIN — Medication 10 MILLIGRAM(S): at 01:01

## 2017-02-25 RX ADMIN — PANTOPRAZOLE SODIUM 40 MILLIGRAM(S): 20 TABLET, DELAYED RELEASE ORAL at 12:15

## 2017-02-25 RX ADMIN — ESCITALOPRAM OXALATE 20 MILLIGRAM(S): 10 TABLET, FILM COATED ORAL at 12:17

## 2017-02-25 RX ADMIN — PREGABALIN 250 MICROGRAM(S): 225 CAPSULE ORAL at 12:17

## 2017-02-25 RX ADMIN — Medication 325 MILLIGRAM(S): at 12:16

## 2017-02-25 RX ADMIN — Medication 25 MILLIGRAM(S): at 15:09

## 2017-02-25 RX ADMIN — Medication 400 UNIT(S): at 12:17

## 2017-02-25 RX ADMIN — ENOXAPARIN SODIUM 60 MILLIGRAM(S): 100 INJECTION SUBCUTANEOUS at 12:18

## 2017-02-25 NOTE — PROGRESS NOTE ADULT - SUBJECTIVE AND OBJECTIVE BOX
INTERVAL HPI/OVERNIGHT EVENTS: dressing changed    SUBJECTIVE:  Flatus: [x ] YES [ ] NO             Bowel Movement: [ x] YES [ ] NO  Pain Control Adequate: [ x] YES [ ] NO  Nausea: [ ] YES [x ] NO            Vomiting: [ ] YES [x ] NO  Diarrhea: [ ] YES [x ] NO         Constipation: [ ] YES [x ] NO     Chest Pain: [ ] YES [x ] NO    SOB:  [ ] YES [x ] NO    MEDICATIONS  (STANDING):  losartan 100milliGRAM(s) Oral daily  escitalopram 20milliGRAM(s) Oral daily  ALBUTerol    90 MICROgram(s) HFA Inhaler 1Puff(s) Inhalation every 4 hours  pantoprazole  Injectable 40milliGRAM(s) IV Push daily  cyanocobalamin 250MICROGram(s) Oral daily  cholecalciferol 400Unit(s) Oral daily  multivitamin 1Tablet(s) Oral daily  ferrous    sulfate 325milliGRAM(s) Oral daily  enoxaparin Injectable 60milliGRAM(s) SubCutaneous daily    MEDICATIONS  (PRN):  acetaminophen   Tablet. 325milliGRAM(s) Oral every 4 hours PRN Moderate Pain (4 - 6)  ondansetron Injectable 4milliGRAM(s) IV Push every 6 hours PRN Nausea  ALBUTerol    0.083% 2.5milliGRAM(s) Nebulizer every 6 hours PRN Shortness of Breath and/or Wheezing  oxybutynin 5milliGRAM(s) Oral two times a day PRN Bladder spasms  diazepam    Tablet 10milliGRAM(s) Oral at bedtime PRN insomnia  diphenhydrAMINE   Capsule 25milliGRAM(s) Oral every 6 hours PRN Rash and/or Itching  oxyCODONE  5 mG/acetaminophen 325 mG 1Tablet(s) Oral every 4 hours PRN Severe Pain (7 - 10)      Vital Signs Last 24 Hrs  T(C): 36.4, Max: 36.8 (02-24 @ 20:52)  T(F): 97.5, Max: 98.2 (02-24 @ 20:52)  HR: 65 (65 - 82)  BP: 129/85 (107/72 - 129/85)  BP(mean): 88 (88 - 88)  RR: 18 (16 - 18)  SpO2: 96% (95% - 96%)    PHYSICAL EXAM:      Constitutional: NAD, A&Ox3    Gastrointestinal: Soft, NT/ND, No guarding or rebound    Extremities: no peripheral edema    Incision: open wound, draining fluid        I&O's Detail    I & Os for current day (as of 25 Feb 2017 07:47)  =============================================  IN:    Oral Fluid: 1340 ml    Total IN: 1340 ml  ---------------------------------------------  OUT:    Voided: 1450 ml    Total OUT: 1450 ml  ---------------------------------------------  Total NET: -110 ml      LABS:                        9.6    7.1   )-----------( 321      ( 24 Feb 2017 07:14 )             31.5     24 Feb 2017 07:13    140    |  106    |  28     ----------------------------<  87     4.6     |  28     |  0.68     Ca    10.4       24 Feb 2017 07:13  Phos  3.8       24 Feb 2017 07:13  Mg     2.1       24 Feb 2017 07:13              RADIOLOGY & ADDITIONAL STUDIES:

## 2017-02-26 LAB
ANION GAP SERPL CALC-SCNC: 9 MMOL/L — SIGNIFICANT CHANGE UP (ref 9–16)
BUN SERPL-MCNC: 24 MG/DL — HIGH (ref 7–23)
CALCIUM SERPL-MCNC: 10.3 MG/DL — SIGNIFICANT CHANGE UP (ref 8.5–10.5)
CHLORIDE SERPL-SCNC: 104 MMOL/L — SIGNIFICANT CHANGE UP (ref 96–108)
CO2 SERPL-SCNC: 27 MMOL/L — SIGNIFICANT CHANGE UP (ref 22–31)
CREAT SERPL-MCNC: 0.67 MG/DL — SIGNIFICANT CHANGE UP (ref 0.5–1.3)
GLUCOSE SERPL-MCNC: 75 MG/DL — SIGNIFICANT CHANGE UP (ref 70–99)
HCT VFR BLD CALC: 33.2 % — LOW (ref 34.5–45)
HGB BLD-MCNC: 10 G/DL — LOW (ref 11.5–15.5)
MAGNESIUM SERPL-MCNC: 2.2 MG/DL — SIGNIFICANT CHANGE UP (ref 1.6–2.4)
MCHC RBC-ENTMCNC: 27.5 PG — SIGNIFICANT CHANGE UP (ref 27–34)
MCHC RBC-ENTMCNC: 30.1 G/DL — LOW (ref 32–36)
MCV RBC AUTO: 91.2 FL — SIGNIFICANT CHANGE UP (ref 80–100)
PHOSPHATE SERPL-MCNC: 3.4 MG/DL — SIGNIFICANT CHANGE UP (ref 2.5–4.5)
PLATELET # BLD AUTO: 300 K/UL — SIGNIFICANT CHANGE UP (ref 150–400)
POTASSIUM SERPL-MCNC: 4.2 MMOL/L — SIGNIFICANT CHANGE UP (ref 3.5–5.3)
POTASSIUM SERPL-SCNC: 4.2 MMOL/L — SIGNIFICANT CHANGE UP (ref 3.5–5.3)
RBC # BLD: 3.64 M/UL — LOW (ref 3.8–5.2)
RBC # FLD: 17.4 % — HIGH (ref 10.3–16.9)
SODIUM SERPL-SCNC: 140 MMOL/L — SIGNIFICANT CHANGE UP (ref 135–145)
WBC # BLD: 6 K/UL — SIGNIFICANT CHANGE UP (ref 3.8–10.5)
WBC # FLD AUTO: 6 K/UL — SIGNIFICANT CHANGE UP (ref 3.8–10.5)

## 2017-02-26 RX ADMIN — ESCITALOPRAM OXALATE 20 MILLIGRAM(S): 10 TABLET, FILM COATED ORAL at 16:22

## 2017-02-26 RX ADMIN — Medication 325 MILLIGRAM(S): at 16:12

## 2017-02-26 RX ADMIN — Medication 10 MILLIGRAM(S): at 02:01

## 2017-02-26 RX ADMIN — ENOXAPARIN SODIUM 60 MILLIGRAM(S): 100 INJECTION SUBCUTANEOUS at 16:11

## 2017-02-26 RX ADMIN — Medication 25 MILLIGRAM(S): at 18:26

## 2017-02-26 RX ADMIN — Medication 25 MILLIGRAM(S): at 10:39

## 2017-02-26 RX ADMIN — Medication 1 TABLET(S): at 16:12

## 2017-02-26 RX ADMIN — PANTOPRAZOLE SODIUM 40 MILLIGRAM(S): 20 TABLET, DELAYED RELEASE ORAL at 16:10

## 2017-02-26 RX ADMIN — PREGABALIN 250 MICROGRAM(S): 225 CAPSULE ORAL at 16:11

## 2017-02-26 RX ADMIN — Medication 400 UNIT(S): at 16:11

## 2017-02-26 NOTE — PROGRESS NOTE ADULT - SUBJECTIVE AND OBJECTIVE BOX
O/N: Dressings changed. Several fistulous tracts in larger wound draining PO intake  2/25: dressing changed, solid food particles in midline, AVSS O/N: Dressings changed. Several fistulous tracts in larger wound draining PO intake  2/25: dressing changed, solid food particles in midline, AVSS      SUBJECTIVE:  Flatus: [x ] YES [ ] NO             Bowel Movement: [x ] YES [ ] NO  Pain Control Adequate: [x ] YES [ ] NO  Nausea: [ ] YES [x ] NO            Vomiting: [ ] YES [x ] NO  Diarrhea: [ ] YES [x ] NO         Constipation: [ ] YES [ x] NO     Chest Pain: [ ] YES [ x] NO    SOB:  [ ] YES [ x] NO    MEDICATIONS  (STANDING):  losartan 100milliGRAM(s) Oral daily  escitalopram 20milliGRAM(s) Oral daily  ALBUTerol    90 MICROgram(s) HFA Inhaler 1Puff(s) Inhalation every 4 hours  pantoprazole  Injectable 40milliGRAM(s) IV Push daily  cyanocobalamin 250MICROGram(s) Oral daily  cholecalciferol 400Unit(s) Oral daily  multivitamin 1Tablet(s) Oral daily  ferrous    sulfate 325milliGRAM(s) Oral daily  enoxaparin Injectable 60milliGRAM(s) SubCutaneous daily    MEDICATIONS  (PRN):  acetaminophen   Tablet. 325milliGRAM(s) Oral every 4 hours PRN Moderate Pain (4 - 6)  ondansetron Injectable 4milliGRAM(s) IV Push every 6 hours PRN Nausea  ALBUTerol    0.083% 2.5milliGRAM(s) Nebulizer every 6 hours PRN Shortness of Breath and/or Wheezing  oxybutynin 5milliGRAM(s) Oral two times a day PRN Bladder spasms  diazepam    Tablet 10milliGRAM(s) Oral at bedtime PRN insomnia  diphenhydrAMINE   Capsule 25milliGRAM(s) Oral every 6 hours PRN Rash and/or Itching  oxyCODONE  5 mG/acetaminophen 325 mG 1Tablet(s) Oral every 4 hours PRN Severe Pain (7 - 10)      Vital Signs Last 24 Hrs  T(C): 36.1, Max: 36.7 (02-25 @ 11:55)  T(F): 97, Max: 98.1 (02-25 @ 11:55)  HR: 82 (66 - 86)  BP: 128/75 (115/79 - 138/70)  BP(mean): --  RR: 16 (15 - 17)  SpO2: 96% (96% - 97%)    PHYSICAL EXAM:      Constitutional: NAD, A&Ox3    Gastrointestinal: Soft, NT/ND, No guarding or rebound    Extremities: no peripheral edema    Incision: midline incision open with areas draining        I&O's Detail    I & Os for current day (as of 26 Feb 2017 07:36)  =============================================  IN:    Oral Fluid: 1320 ml    Total IN: 1320 ml  ---------------------------------------------  OUT:    Voided: 1500 ml    Total OUT: 1500 ml  ---------------------------------------------  Total NET: -180 ml      LABS:                        9.7    6.5   )-----------( 322      ( 25 Feb 2017 07:46 )             32.2     25 Feb 2017 07:46    140    |  103    |  28     ----------------------------<  80     4.5     |  29     |  0.76     Ca    10.5       25 Feb 2017 07:46  Phos  3.6       25 Feb 2017 07:46  Mg     2.2       25 Feb 2017 07:46              RADIOLOGY & ADDITIONAL STUDIES:

## 2017-02-26 NOTE — PROGRESS NOTE ADULT - ASSESSMENT
56yoF with PMH HTN, LSG converted to RYGB (2002) complicated by stricture and multiple leaks s/p most recent revision (2016) admitted with abdominal abscesses and fistulae  Pain/Nausea control  Diet bariatric diet  [id number]  will discuss plan with attending

## 2017-02-27 LAB
ANION GAP SERPL CALC-SCNC: 7 MMOL/L — LOW (ref 9–16)
BUN SERPL-MCNC: 24 MG/DL — HIGH (ref 7–23)
CALCIUM SERPL-MCNC: 10.1 MG/DL — SIGNIFICANT CHANGE UP (ref 8.5–10.5)
CHLORIDE SERPL-SCNC: 103 MMOL/L — SIGNIFICANT CHANGE UP (ref 96–108)
CO2 SERPL-SCNC: 29 MMOL/L — SIGNIFICANT CHANGE UP (ref 22–31)
CREAT SERPL-MCNC: 0.57 MG/DL — SIGNIFICANT CHANGE UP (ref 0.5–1.3)
GLUCOSE SERPL-MCNC: 85 MG/DL — SIGNIFICANT CHANGE UP (ref 70–99)
HCT VFR BLD CALC: 33.2 % — LOW (ref 34.5–45)
HGB BLD-MCNC: 10.2 G/DL — LOW (ref 11.5–15.5)
MAGNESIUM SERPL-MCNC: 2.2 MG/DL — SIGNIFICANT CHANGE UP (ref 1.6–2.4)
MCHC RBC-ENTMCNC: 27.9 PG — SIGNIFICANT CHANGE UP (ref 27–34)
MCHC RBC-ENTMCNC: 30.7 G/DL — LOW (ref 32–36)
MCV RBC AUTO: 90.7 FL — SIGNIFICANT CHANGE UP (ref 80–100)
PHOSPHATE SERPL-MCNC: 3.5 MG/DL — SIGNIFICANT CHANGE UP (ref 2.5–4.5)
PLATELET # BLD AUTO: 289 K/UL — SIGNIFICANT CHANGE UP (ref 150–400)
POTASSIUM SERPL-MCNC: 4.5 MMOL/L — SIGNIFICANT CHANGE UP (ref 3.5–5.3)
POTASSIUM SERPL-SCNC: 4.5 MMOL/L — SIGNIFICANT CHANGE UP (ref 3.5–5.3)
RBC # BLD: 3.66 M/UL — LOW (ref 3.8–5.2)
RBC # FLD: 17.6 % — HIGH (ref 10.3–16.9)
SODIUM SERPL-SCNC: 139 MMOL/L — SIGNIFICANT CHANGE UP (ref 135–145)
WBC # BLD: 8 K/UL — SIGNIFICANT CHANGE UP (ref 3.8–10.5)
WBC # FLD AUTO: 8 K/UL — SIGNIFICANT CHANGE UP (ref 3.8–10.5)

## 2017-02-27 RX ORDER — PANTOPRAZOLE SODIUM 20 MG/1
40 TABLET, DELAYED RELEASE ORAL DAILY
Qty: 0 | Refills: 0 | Status: DISCONTINUED | OUTPATIENT
Start: 2017-02-28 | End: 2017-03-13

## 2017-02-27 RX ADMIN — Medication 25 MILLIGRAM(S): at 04:15

## 2017-02-27 RX ADMIN — Medication 25 MILLIGRAM(S): at 15:51

## 2017-02-27 RX ADMIN — ESCITALOPRAM OXALATE 20 MILLIGRAM(S): 10 TABLET, FILM COATED ORAL at 11:32

## 2017-02-27 RX ADMIN — Medication 400 UNIT(S): at 11:32

## 2017-02-27 RX ADMIN — Medication 325 MILLIGRAM(S): at 11:32

## 2017-02-27 RX ADMIN — Medication 1 TABLET(S): at 11:32

## 2017-02-27 RX ADMIN — PREGABALIN 250 MICROGRAM(S): 225 CAPSULE ORAL at 11:32

## 2017-02-27 RX ADMIN — PANTOPRAZOLE SODIUM 40 MILLIGRAM(S): 20 TABLET, DELAYED RELEASE ORAL at 11:39

## 2017-02-27 RX ADMIN — Medication 25 MILLIGRAM(S): at 21:53

## 2017-02-27 RX ADMIN — Medication 10 MILLIGRAM(S): at 00:24

## 2017-02-27 RX ADMIN — ENOXAPARIN SODIUM 60 MILLIGRAM(S): 100 INJECTION SUBCUTANEOUS at 11:33

## 2017-02-27 RX ADMIN — Medication 10 MILLIGRAM(S): at 18:48

## 2017-02-27 NOTE — PROVIDER CONTACT NOTE (OTHER) - BACKGROUND
Patient admitted from subacute rehab due to fever and chills after Discharge from [facility]. Patient originally admitted due to gastric bypass surgery that was complicated by multiple leaks.

## 2017-02-27 NOTE — PROVIDER CONTACT NOTE (OTHER) - SITUATION
Patient had woundvac placed today. Upon standing earlier with PT patient had content leaking from around woundvac dressing. Team arrived to assess patient earlier. Patient had second episode at 7pm

## 2017-02-27 NOTE — PROGRESS NOTE ADULT - SUBJECTIVE AND OBJECTIVE BOX
O/N: abdominal wounds cleaned and repacked, output from midline tracts unchanged. AVSS  2/26: dressing changed, continued output from midline, decreased output from off midline tracts O/N: abdominal wounds cleaned and repacked, output from midline tracts unchanged. AVSS  2/26: dressing changed, continued output from midline, decreased output from off midline tracts    SUBJECTIVE: Pt seen and examined at bedside. No overnight events.  Pain controlled. No f/c/n/v.     Vital Signs Last 24 Hrs  T(C): 36.4, Max: 37.4 (02-26 @ 14:48)  T(F): 97.6, Max: 99.3 (02-26 @ 14:48)  HR: 69 (69 - 104)  BP: 115/79 (105/71 - 119/87)  BP(mean): --  RR: 16 (16 - 17)  SpO2: 99% (94% - 99%)    PHYSICAL EXAM    Gen: NAD  Pulm: no respiratory distress  Abd: soft,  midline wound granulating tissue , purulent material coming out of the fistula, 5 incision and drainage  sites packed with iodoform dressing  granulating tissue observed.      I&O's Detail    I & Os for current day (as of 27 Feb 2017 08:31)  =============================================  IN:    Oral Fluid: 1440 ml    Total IN: 1440 ml  ---------------------------------------------  OUT:    Voided: 1100 ml    Total OUT: 1100 ml  ---------------------------------------------  Total NET: 340 ml      LABS:                        10.2   8.0   )-----------( 289      ( 27 Feb 2017 07:32 )             33.2     27 Feb 2017 07:32    139    |  103    |  24     ----------------------------<  85     4.5     |  29     |  0.57     Ca    10.1       27 Feb 2017 07:32  Phos  3.5       27 Feb 2017 07:32  Mg     2.2       27 Feb 2017 07:32            MEDICATIONS  (STANDING):  losartan 100milliGRAM(s) Oral daily  escitalopram 20milliGRAM(s) Oral daily  ALBUTerol    90 MICROgram(s) HFA Inhaler 1Puff(s) Inhalation every 4 hours  pantoprazole  Injectable 40milliGRAM(s) IV Push daily  cyanocobalamin 250MICROGram(s) Oral daily  cholecalciferol 400Unit(s) Oral daily  multivitamin 1Tablet(s) Oral daily  ferrous    sulfate 325milliGRAM(s) Oral daily  enoxaparin Injectable 60milliGRAM(s) SubCutaneous daily    MEDICATIONS  (PRN):  acetaminophen   Tablet. 325milliGRAM(s) Oral every 4 hours PRN Moderate Pain (4 - 6)  ondansetron Injectable 4milliGRAM(s) IV Push every 6 hours PRN Nausea  ALBUTerol    0.083% 2.5milliGRAM(s) Nebulizer every 6 hours PRN Shortness of Breath and/or Wheezing  oxybutynin 5milliGRAM(s) Oral two times a day PRN Bladder spasms  diazepam    Tablet 10milliGRAM(s) Oral at bedtime PRN insomnia  diphenhydrAMINE   Capsule 25milliGRAM(s) Oral every 6 hours PRN Rash and/or Itching  oxyCODONE  5 mG/acetaminophen 325 mG 1Tablet(s) Oral every 4 hours PRN Severe Pain (7 - 10)      RADIOLOGY & ADDITIONAL STUDIES:    ASSESSMENT AND PLAN

## 2017-02-27 NOTE — PROGRESS NOTE ADULT - ASSESSMENT
56yoF with PMH HTN, LSG converted to RYGB (2002) complicated by stricture and multiple leaks s/p most recent revision (2016) admitted with abdominal abscesses and fistulae  Pain/Nausea control  Diet bariatric diet  [id number]/OOBA/IS  will discuss plan with attending

## 2017-02-27 NOTE — PROVIDER CONTACT NOTE (OTHER) - ASSESSMENT
Patient had dark green content leaking from around black woundvac dressing. Patient stated it resembled soup she had for lunch. Seal is intact and woundvac seal check was assessed. No leak noted in woundvac.

## 2017-02-27 NOTE — PROGRESS NOTE ADULT - SUBJECTIVE AND OBJECTIVE BOX
seems to be eating well and doing well   no sepsis   has small fistula from right of wound that goes towards old j tube site  think may be able to place vacum with white sponge to base of this to get faster healing   this is low output   the other tracts seem to be healing

## 2017-02-28 LAB
ANION GAP SERPL CALC-SCNC: 9 MMOL/L — SIGNIFICANT CHANGE UP (ref 9–16)
BUN SERPL-MCNC: 22 MG/DL — SIGNIFICANT CHANGE UP (ref 7–23)
CALCIUM SERPL-MCNC: 9.6 MG/DL — SIGNIFICANT CHANGE UP (ref 8.5–10.5)
CHLORIDE SERPL-SCNC: 103 MMOL/L — SIGNIFICANT CHANGE UP (ref 96–108)
CO2 SERPL-SCNC: 27 MMOL/L — SIGNIFICANT CHANGE UP (ref 22–31)
CREAT SERPL-MCNC: 0.68 MG/DL — SIGNIFICANT CHANGE UP (ref 0.5–1.3)
GLUCOSE SERPL-MCNC: 87 MG/DL — SIGNIFICANT CHANGE UP (ref 70–99)
HCT VFR BLD CALC: 32 % — LOW (ref 34.5–45)
HGB BLD-MCNC: 9.6 G/DL — LOW (ref 11.5–15.5)
MAGNESIUM SERPL-MCNC: 2.2 MG/DL — SIGNIFICANT CHANGE UP (ref 1.6–2.4)
MCHC RBC-ENTMCNC: 27.5 PG — SIGNIFICANT CHANGE UP (ref 27–34)
MCHC RBC-ENTMCNC: 30 G/DL — LOW (ref 32–36)
MCV RBC AUTO: 91.7 FL — SIGNIFICANT CHANGE UP (ref 80–100)
PHOSPHATE SERPL-MCNC: 3.8 MG/DL — SIGNIFICANT CHANGE UP (ref 2.5–4.5)
PLATELET # BLD AUTO: 273 K/UL — SIGNIFICANT CHANGE UP (ref 150–400)
POTASSIUM SERPL-MCNC: 4.3 MMOL/L — SIGNIFICANT CHANGE UP (ref 3.5–5.3)
POTASSIUM SERPL-SCNC: 4.3 MMOL/L — SIGNIFICANT CHANGE UP (ref 3.5–5.3)
PREALB SERPL-MCNC: 26 MG/DL — SIGNIFICANT CHANGE UP (ref 20–40)
RBC # BLD: 3.49 M/UL — LOW (ref 3.8–5.2)
RBC # FLD: 17.8 % — HIGH (ref 10.3–16.9)
SODIUM SERPL-SCNC: 139 MMOL/L — SIGNIFICANT CHANGE UP (ref 135–145)
WBC # BLD: 6.6 K/UL — SIGNIFICANT CHANGE UP (ref 3.8–10.5)
WBC # FLD AUTO: 6.6 K/UL — SIGNIFICANT CHANGE UP (ref 3.8–10.5)

## 2017-02-28 RX ORDER — COLLAGENASE CLOSTRIDIUM HIST. 250 UNIT/G
1 OINTMENT (GRAM) TOPICAL DAILY
Qty: 0 | Refills: 0 | Status: DISCONTINUED | OUTPATIENT
Start: 2017-02-28 | End: 2017-03-13

## 2017-02-28 RX ADMIN — ESCITALOPRAM OXALATE 20 MILLIGRAM(S): 10 TABLET, FILM COATED ORAL at 11:59

## 2017-02-28 RX ADMIN — PANTOPRAZOLE SODIUM 40 MILLIGRAM(S): 20 TABLET, DELAYED RELEASE ORAL at 11:58

## 2017-02-28 RX ADMIN — Medication 400 UNIT(S): at 11:59

## 2017-02-28 RX ADMIN — PREGABALIN 250 MICROGRAM(S): 225 CAPSULE ORAL at 11:59

## 2017-02-28 RX ADMIN — ENOXAPARIN SODIUM 60 MILLIGRAM(S): 100 INJECTION SUBCUTANEOUS at 11:59

## 2017-02-28 RX ADMIN — Medication 1 TABLET(S): at 11:59

## 2017-02-28 RX ADMIN — Medication 25 MILLIGRAM(S): at 13:44

## 2017-02-28 RX ADMIN — Medication 10 MILLIGRAM(S): at 11:59

## 2017-02-28 RX ADMIN — Medication 325 MILLIGRAM(S): at 11:59

## 2017-02-28 RX ADMIN — LOSARTAN POTASSIUM 100 MILLIGRAM(S): 100 TABLET, FILM COATED ORAL at 07:09

## 2017-02-28 RX ADMIN — Medication 25 MILLIGRAM(S): at 07:09

## 2017-02-28 RX ADMIN — Medication 1 APPLICATION(S): at 12:10

## 2017-02-28 NOTE — ADVANCED PRACTICE NURSE CONSULT - RECOMMEDATIONS
Pressure injury - cleanse with normal saline, apply Collagenase and cover with Medipore dressing daily.  Apply antifungal cream to fungal rash twice daily.   Discussed assessment and recommendations with Carmelina and house staff.

## 2017-02-28 NOTE — ADVANCED PRACTICE NURSE CONSULT - REASON FOR CONSULT
[facility] nurse consult for 56F w/PMHx of HTN, gastric bypass in 2002 c/b stricture, corkscrewed sleeve and chronic leak, recently revised on 11/28/16 with protracted (70-day) postoperative course complicated by MDR infections, a C-diff infection, respiratory compromise 2/2 plugging/PNA/effusions requiring multiple interventions and a trach, as well as a continued leak requiring a draining double-pigtail with stenting performed by GI.  Patient was discharged to rehab and recently development of fevers, 101.4 five days ago. On exam, patient stated that she had abdominal pain but she was nontender on exam. There was freshly packed I&D site just superior to her J tube in her R hypogastrium, there is a draining prior drain site LLQ with a large area of erythematous induration with a small bulla at its center just lateral to prior drain site.  This bulla expressed purulent drainage.

## 2017-02-28 NOTE — PROGRESS NOTE ADULT - ASSESSMENT
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

## 2017-02-28 NOTE — ADVANCED PRACTICE NURSE CONSULT - ASSESSMENT
Patient presented with unstageable pressure ulcer (injury) on right upper buttocks measuring 0.5 cm x 1.4 cm, that was present on admission, no exudate noted. Bilateral buttocks fungal rash noted.  NPWT/VAC in place on mid abdominal wound with small amount of brownish/green effluent in cannister. Dr Kramer aware of effluent. House staff changing VAC dressing.  Patient able to turn independently while in bed.   Carmelina ROTH, present during assessment.

## 2017-02-28 NOTE — PROGRESS NOTE ADULT - SUBJECTIVE AND OBJECTIVE BOX
55 yo female with no complaints today. Pt is tolerating po and working with PT.  Denies having normal BM.  Denies fever, chilsl, n/v, chest pain, sob, abdominal pain, leg pain, lightheadedness, dizziness.                        9.6    6.6   )-----------( 273      ( 28 Feb 2017 06:53 )             32.0     28 Feb 2017 06:53    139    |  103    |  22     ----------------------------<  87     4.3     |  27     |  0.68     Ca    9.6        28 Feb 2017 06:53  Phos  3.8       28 Feb 2017 06:53  Mg     2.2       28 Feb 2017 06:53    T(C): 36.9, Max: 37.1 (02-27 @ 14:00)  HR: 67 (63 - 87)  BP: 129/84 (117/78 - 129/84)  RR: 15 (15 - 18)  SpO2: 98% (95% - 98%)  Wt(kg): --    gen: A&0x3, NAD  Heart: s1,s2 RRR,   Lung: CTA B/L  Abd: n/d, n/t, soft, mid line incision with <1 cm wide tract with clear drainage containing yellow particles, minimal surrounding erythema around incisions, two other abscesses packed with clean dressing  LE: no edema, b/l LE, no gerard b/l LE  skin: MMM, stage 1 pressure ulcer on right buttocks <1.5 cm    55 yo female s/p multiple bariatric revisions complicated by leaks, readmitted for fever and superficial abdominal abscesses, receiving daily wound care and dressing changes, wound vacc changed at bedside with drainage from tract in midline wound most likely fistula, dr tucker made aware    Plan:  PT  frequent position change  regular diet per dr tucker  wound vacc and dressing change QD  oob, scds, lovenox  ppi  nausea control prn  pain control prn

## 2017-03-01 LAB
ANION GAP SERPL CALC-SCNC: 8 MMOL/L — LOW (ref 9–16)
BUN SERPL-MCNC: 21 MG/DL — SIGNIFICANT CHANGE UP (ref 7–23)
CALCIUM SERPL-MCNC: 9.9 MG/DL — SIGNIFICANT CHANGE UP (ref 8.5–10.5)
CHLORIDE SERPL-SCNC: 105 MMOL/L — SIGNIFICANT CHANGE UP (ref 96–108)
CO2 SERPL-SCNC: 27 MMOL/L — SIGNIFICANT CHANGE UP (ref 22–31)
CREAT SERPL-MCNC: 0.52 MG/DL — SIGNIFICANT CHANGE UP (ref 0.5–1.3)
GLUCOSE SERPL-MCNC: 81 MG/DL — SIGNIFICANT CHANGE UP (ref 70–99)
HCT VFR BLD CALC: 33.3 % — LOW (ref 34.5–45)
HGB BLD-MCNC: 10.3 G/DL — LOW (ref 11.5–15.5)
MAGNESIUM SERPL-MCNC: 2.2 MG/DL — SIGNIFICANT CHANGE UP (ref 1.6–2.4)
MCHC RBC-ENTMCNC: 28 PG — SIGNIFICANT CHANGE UP (ref 27–34)
MCHC RBC-ENTMCNC: 30.9 G/DL — LOW (ref 32–36)
MCV RBC AUTO: 90.5 FL — SIGNIFICANT CHANGE UP (ref 80–100)
PHOSPHATE SERPL-MCNC: 3.4 MG/DL — SIGNIFICANT CHANGE UP (ref 2.5–4.5)
PLATELET # BLD AUTO: 290 K/UL — SIGNIFICANT CHANGE UP (ref 150–400)
POTASSIUM SERPL-MCNC: 4.4 MMOL/L — SIGNIFICANT CHANGE UP (ref 3.5–5.3)
POTASSIUM SERPL-SCNC: 4.4 MMOL/L — SIGNIFICANT CHANGE UP (ref 3.5–5.3)
RBC # BLD: 3.68 M/UL — LOW (ref 3.8–5.2)
RBC # FLD: 17.2 % — HIGH (ref 10.3–16.9)
SODIUM SERPL-SCNC: 140 MMOL/L — SIGNIFICANT CHANGE UP (ref 135–145)
WBC # BLD: 7.2 K/UL — SIGNIFICANT CHANGE UP (ref 3.8–10.5)
WBC # FLD AUTO: 7.2 K/UL — SIGNIFICANT CHANGE UP (ref 3.8–10.5)

## 2017-03-01 RX ADMIN — Medication 325 MILLIGRAM(S): at 12:52

## 2017-03-01 RX ADMIN — PANTOPRAZOLE SODIUM 40 MILLIGRAM(S): 20 TABLET, DELAYED RELEASE ORAL at 12:53

## 2017-03-01 RX ADMIN — Medication 25 MILLIGRAM(S): at 16:36

## 2017-03-01 RX ADMIN — Medication 325 MILLIGRAM(S): at 23:28

## 2017-03-01 RX ADMIN — ENOXAPARIN SODIUM 60 MILLIGRAM(S): 100 INJECTION SUBCUTANEOUS at 12:52

## 2017-03-01 RX ADMIN — Medication 10 MILLIGRAM(S): at 01:39

## 2017-03-01 RX ADMIN — Medication 1 APPLICATION(S): at 12:46

## 2017-03-01 RX ADMIN — Medication 25 MILLIGRAM(S): at 09:56

## 2017-03-01 RX ADMIN — Medication 10 MILLIGRAM(S): at 16:06

## 2017-03-01 RX ADMIN — Medication 25 MILLIGRAM(S): at 22:29

## 2017-03-01 RX ADMIN — Medication 400 UNIT(S): at 12:53

## 2017-03-01 RX ADMIN — Medication 5 MILLIGRAM(S): at 22:29

## 2017-03-01 RX ADMIN — PREGABALIN 250 MICROGRAM(S): 225 CAPSULE ORAL at 12:53

## 2017-03-01 RX ADMIN — Medication 1 TABLET(S): at 12:53

## 2017-03-01 RX ADMIN — Medication 325 MILLIGRAM(S): at 22:29

## 2017-03-01 RX ADMIN — ESCITALOPRAM OXALATE 20 MILLIGRAM(S): 10 TABLET, FILM COATED ORAL at 12:52

## 2017-03-01 NOTE — PROGRESS NOTE ADULT - SUBJECTIVE AND OBJECTIVE BOX
Patient eating well and seems better  only issue is wound control   fistula is low output and changed two days ago  will change daily with white sponge  do not want to make npo as low output and needs nutrition and do not want to start tpn   and picc access limited  no sign of peripheral infection  will try to increase dressing change as low output  and then start prophylactic antibiotic

## 2017-03-01 NOTE — PROGRESS NOTE ADULT - ASSESSMENT
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

## 2017-03-02 PROCEDURE — 71010: CPT | Mod: 26

## 2017-03-02 RX ORDER — ACETAMINOPHEN 500 MG
650 TABLET ORAL ONCE
Qty: 0 | Refills: 0 | Status: COMPLETED | OUTPATIENT
Start: 2017-03-02 | End: 2017-03-02

## 2017-03-02 RX ADMIN — Medication 5 MILLIGRAM(S): at 21:36

## 2017-03-02 RX ADMIN — Medication 25 MILLIGRAM(S): at 19:03

## 2017-03-02 RX ADMIN — ESCITALOPRAM OXALATE 20 MILLIGRAM(S): 10 TABLET, FILM COATED ORAL at 11:35

## 2017-03-02 RX ADMIN — Medication 10 MILLIGRAM(S): at 21:36

## 2017-03-02 RX ADMIN — Medication 400 UNIT(S): at 11:35

## 2017-03-02 RX ADMIN — PANTOPRAZOLE SODIUM 40 MILLIGRAM(S): 20 TABLET, DELAYED RELEASE ORAL at 11:35

## 2017-03-02 RX ADMIN — Medication 1 APPLICATION(S): at 11:36

## 2017-03-02 RX ADMIN — ENOXAPARIN SODIUM 60 MILLIGRAM(S): 100 INJECTION SUBCUTANEOUS at 11:35

## 2017-03-02 RX ADMIN — PREGABALIN 250 MICROGRAM(S): 225 CAPSULE ORAL at 11:35

## 2017-03-02 RX ADMIN — Medication 1 TABLET(S): at 11:35

## 2017-03-02 RX ADMIN — Medication 650 MILLIGRAM(S): at 20:43

## 2017-03-02 RX ADMIN — Medication 25 MILLIGRAM(S): at 04:35

## 2017-03-02 RX ADMIN — Medication 325 MILLIGRAM(S): at 11:35

## 2017-03-02 RX ADMIN — Medication 25 MILLIGRAM(S): at 10:23

## 2017-03-02 RX ADMIN — LOSARTAN POTASSIUM 100 MILLIGRAM(S): 100 TABLET, FILM COATED ORAL at 11:35

## 2017-03-02 NOTE — PROGRESS NOTE ADULT - ASSESSMENT
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

## 2017-03-02 NOTE — PROGRESS NOTE ADULT - SUBJECTIVE AND OBJECTIVE BOX
O/N: dressing changed  3/1: Vac changed today--white foam applied to midline ECF opening w/ black sponge over 5cm midline wound. 3 abscess drainage DAILY PROGRESS NOTE:    INTERVAL HPI / OVERNIGHT EVENTS:  O/N: dressing changed  3/1: Vac changed today--white foam applied to midline ECF opening w/ black sponge over 5cm midline wound. 3 abscess drainage     SUBJECTIVE: Pt seen & examined at bedside. Pain controlled. No complaints. No F/C, N/V, CP/SOB.    MEDICATIONS  (STANDING):  losartan 100milliGRAM(s) Oral daily  escitalopram 20milliGRAM(s) Oral daily  ALBUTerol    90 MICROgram(s) HFA Inhaler 1Puff(s) Inhalation every 4 hours  cyanocobalamin 250MICROGram(s) Oral daily  cholecalciferol 400Unit(s) Oral daily  multivitamin 1Tablet(s) Oral daily  ferrous    sulfate 325milliGRAM(s) Oral daily  enoxaparin Injectable 60milliGRAM(s) SubCutaneous daily  pantoprazole    Tablet 40milliGRAM(s) Oral daily  collagenase Ointment 1Application(s) Topical daily  bisacodyl 5milliGRAM(s) Oral at bedtime    MEDICATIONS  (PRN):  acetaminophen   Tablet. 325milliGRAM(s) Oral every 4 hours PRN Moderate Pain (4 - 6)  ondansetron Injectable 4milliGRAM(s) IV Push every 6 hours PRN Nausea  ALBUTerol    0.083% 2.5milliGRAM(s) Nebulizer every 6 hours PRN Shortness of Breath and/or Wheezing  oxybutynin 5milliGRAM(s) Oral two times a day PRN Bladder spasms  diazepam    Tablet 10milliGRAM(s) Oral at bedtime PRN insomnia  diphenhydrAMINE   Capsule 25milliGRAM(s) Oral every 6 hours PRN Rash and/or Itching  oxyCODONE  5 mG/acetaminophen 325 mG 1Tablet(s) Oral every 4 hours PRN Severe Pain (7 - 10)    Vital Signs Last 24 Hrs  T(C): 36.2, Max: 37.8 (03-01 @ 21:02)  T(F): 97.1, Max: 100 (03-01 @ 21:02)  HR: 63 (63 - 109)  BP: 101/71 (101/71 - 119/87)  BP(mean): --  RR: 17 (16 - 17)  SpO2: 97% (94% - 97%)    PHYSICAL EXAM:  Gen: NAD  Resp: no respiratory distress  Abd: soft, NT; wound vac to midline incision & ECF in place w/ good seal; I&D sites x 3 re-packed.    I&O's Detail    I & Os for current day (as of 02 Mar 2017 07:09)  =============================================  IN:    Oral Fluid: 600 ml    Total IN: 600 ml  ---------------------------------------------  OUT:    Voided: 950 ml    VAC (Vacuum Assisted Closure) System: 100 ml    Total OUT: 1050 ml  ---------------------------------------------  Total NET: -450 ml      LABS:                        10.3   7.2   )-----------( 290      ( 01 Mar 2017 06:56 )             33.3     01 Mar 2017 06:56    140    |  105    |  21     ----------------------------<  81     4.4     |  27     |  0.52     Ca    9.9        01 Mar 2017 06:56  Phos  3.4       01 Mar 2017 06:56  Mg     2.2       01 Mar 2017 06:56

## 2017-03-02 NOTE — PROGRESS NOTE ADULT - SUBJECTIVE AND OBJECTIVE BOX
57 yo female with no complaints today.  Pt is eating well.  Having normal bm.  Working with PT, oob.  Denies fever, chills, n/v, chest pain, sob, abdominal pain, leg pain, lightheadedness, dizziness.                            10.3   7.2   )-----------( 290      ( 01 Mar 2017 06:56 )             33.3     01 Mar 2017 06:56    140    |  105    |  21     ----------------------------<  81     4.4     |  27     |  0.52     Ca    9.9        01 Mar 2017 06:56  Phos  3.4       01 Mar 2017 06:56  Mg     2.2       01 Mar 2017 06:56      T(C): 37.5, Max: 37.8 (03-01 @ 21:02)  HR: 95 (63 - 109)  BP: 116/79 (101/71 - 124/80)  RR: 18 (16 - 18)  SpO2: 95% (94% - 97%)  Wt(kg): --    gen: A&0x3, NAD  Heart: s1,s2 RRR,   Lung: CTA B/L  Abd: n/d, n/t, soft, incisions c/d/i,   LE: no edema, b/l LE, no gerard b/l LE  skin: MMM    57 yo female s/p sleeve revision, conversion to bypass complicated by leak, mdr pneumonia and cdiff, readmitted for superficial abdominal abscess as well as fever, found to have fistula in midline incision, asymptomatic , vss, working with PT,    Plan:  oob, scds, lovenox  wound vacc change daily  incentive spirometer  regular diet per dr tucker  PT  ppi  nausea control prn  pain control prn

## 2017-03-03 LAB
APPEARANCE UR: CLEAR — SIGNIFICANT CHANGE UP
BILIRUB UR-MCNC: NEGATIVE — SIGNIFICANT CHANGE UP
COLOR SPEC: YELLOW — SIGNIFICANT CHANGE UP
DIFF PNL FLD: (no result)
GLUCOSE UR QL: NEGATIVE — SIGNIFICANT CHANGE UP
KETONES UR-MCNC: NEGATIVE — SIGNIFICANT CHANGE UP
LEUKOCYTE ESTERASE UR-ACNC: NEGATIVE — SIGNIFICANT CHANGE UP
NITRITE UR-MCNC: NEGATIVE — SIGNIFICANT CHANGE UP
PH UR: 5.5 — SIGNIFICANT CHANGE UP (ref 4–8)
PROT UR-MCNC: NEGATIVE MG/DL — SIGNIFICANT CHANGE UP
SP GR SPEC: 1.01 — SIGNIFICANT CHANGE UP (ref 1–1.03)
UROBILINOGEN FLD QL: 0.2 E.U./DL — SIGNIFICANT CHANGE UP

## 2017-03-03 RX ORDER — DIAZEPAM 5 MG
10 TABLET ORAL AT BEDTIME
Qty: 0 | Refills: 0 | Status: DISCONTINUED | OUTPATIENT
Start: 2017-03-03 | End: 2017-03-09

## 2017-03-03 RX ADMIN — Medication 10 MILLIGRAM(S): at 23:46

## 2017-03-03 RX ADMIN — Medication 25 MILLIGRAM(S): at 06:31

## 2017-03-03 RX ADMIN — Medication 25 MILLIGRAM(S): at 21:39

## 2017-03-03 RX ADMIN — ENOXAPARIN SODIUM 60 MILLIGRAM(S): 100 INJECTION SUBCUTANEOUS at 11:16

## 2017-03-03 RX ADMIN — PREGABALIN 250 MICROGRAM(S): 225 CAPSULE ORAL at 11:16

## 2017-03-03 RX ADMIN — Medication 1 APPLICATION(S): at 11:20

## 2017-03-03 RX ADMIN — Medication 400 UNIT(S): at 11:16

## 2017-03-03 RX ADMIN — Medication 10 MILLIGRAM(S): at 15:50

## 2017-03-03 RX ADMIN — LOSARTAN POTASSIUM 100 MILLIGRAM(S): 100 TABLET, FILM COATED ORAL at 11:16

## 2017-03-03 RX ADMIN — PANTOPRAZOLE SODIUM 40 MILLIGRAM(S): 20 TABLET, DELAYED RELEASE ORAL at 11:16

## 2017-03-03 RX ADMIN — Medication 25 MILLIGRAM(S): at 12:39

## 2017-03-03 RX ADMIN — Medication 325 MILLIGRAM(S): at 11:16

## 2017-03-03 RX ADMIN — Medication 1 TABLET(S): at 11:16

## 2017-03-03 RX ADMIN — ESCITALOPRAM OXALATE 20 MILLIGRAM(S): 10 TABLET, FILM COATED ORAL at 11:16

## 2017-03-03 NOTE — PROGRESS NOTE ADULT - SUBJECTIVE AND OBJECTIVE BOX
O/N: Dressing changed  3/2: Changed packing in right upper & lower incisions and reinforced DAILY PROGRESS NOTE:    INTERVAL HPI / OVERNIGHT EVENTS:  O/N: Dressing changed  3/2: Changed packing in right upper & lower incisions and reinforced     SUBJECTIVE:  Pt seen & examined at bedside. Pain controlled. OOB/A. +Voids. +Bowel fxn. No complaints. No F/C, N/V, CP/SOB.    MEDICATIONS  (STANDING):  losartan 100milliGRAM(s) Oral daily  escitalopram 20milliGRAM(s) Oral daily  ALBUTerol    90 MICROgram(s) HFA Inhaler 1Puff(s) Inhalation every 4 hours  cyanocobalamin 250MICROGram(s) Oral daily  cholecalciferol 400Unit(s) Oral daily  multivitamin 1Tablet(s) Oral daily  ferrous    sulfate 325milliGRAM(s) Oral daily  enoxaparin Injectable 60milliGRAM(s) SubCutaneous daily  pantoprazole    Tablet 40milliGRAM(s) Oral daily  collagenase Ointment 1Application(s) Topical daily  bisacodyl 5milliGRAM(s) Oral at bedtime  levoFLOXacin IVPB  IV Intermittent     MEDICATIONS  (PRN):  acetaminophen   Tablet. 325milliGRAM(s) Oral every 4 hours PRN Moderate Pain (4 - 6)  ondansetron Injectable 4milliGRAM(s) IV Push every 6 hours PRN Nausea  ALBUTerol    0.083% 2.5milliGRAM(s) Nebulizer every 6 hours PRN Shortness of Breath and/or Wheezing  oxybutynin 5milliGRAM(s) Oral two times a day PRN Bladder spasms  diphenhydrAMINE   Capsule 25milliGRAM(s) Oral every 6 hours PRN Rash and/or Itching  oxyCODONE  5 mG/acetaminophen 325 mG 1Tablet(s) Oral every 4 hours PRN Severe Pain (7 - 10)      Vital Signs Last 24 Hrs  T(C): 36.2, Max: 38.8 (03-02 @ 20:37)  T(F): 97.1, Max: 101.9 (03-02 @ 20:37)  HR: 67 (63 - 111)  BP: 94/67 (94/67 - 116/79)  BP(mean): --  RR: 17 (17 - 18)  SpO2: 99% (95% - 99%)    PHYSICAL EXAM:  Gen: NAD  Resp: no respiratory distress  Abd: soft, NT; wound vac to midline incision & ECF changed, now w/ good seal; incisions x 5 w/ thick, cream-colored discharge cleaned & re-packed.    I&O's Detail  I & Os for 24h ending 03 Mar 2017 07:00  =============================================  IN:    Oral Fluid: 1190 ml    Total IN: 1190 ml  ---------------------------------------------  OUT:    Voided: 1150 ml    VAC (Vacuum Assisted Closure) System: 205 ml    Total OUT: 1355 ml  ---------------------------------------------  Total NET: -165 ml    I & Os for current day (as of 03 Mar 2017 13:23)  =============================================  IN:    Oral Fluid: 480 ml    Total IN: 480 ml  ---------------------------------------------  OUT:    Total OUT: 0 ml  ---------------------------------------------  Total NET: 480 ml      LABS:            Urinalysis Basic - ( 03 Mar 2017 02:26 )    Color: Yellow / Appearance: Clear / S.010 / pH: x  Gluc: x / Ketone: NEGATIVE  / Bili: NEGATIVE / Urobili: 0.2 E.U./dL   Blood: x / Protein: NEGATIVE mg/dL / Nitrite: NEGATIVE   Leuk Esterase: NEGATIVE / RBC: < 5 /HPF / WBC < 5 /HPF   Sq Epi: x / Non Sq Epi: Moderate /HPF / Bacteria: Present /HPF

## 2017-03-03 NOTE — PROGRESS NOTE ADULT - ASSESSMENT
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

## 2017-03-03 NOTE — PROGRESS NOTE ADULT - SUBJECTIVE AND OBJECTIVE BOX
sitting and eating lunch  vac in place with white sponge  had temp spike but did have ct scan recently and no intra abdominal collection and wbss has been fine  if spikes will re scan  fistula low output  reluctant to make npo as access is limited for tpn and think that can get to heal with proper dressing  give daily suppository

## 2017-03-04 LAB
ANION GAP SERPL CALC-SCNC: 10 MMOL/L — SIGNIFICANT CHANGE UP (ref 9–16)
BUN SERPL-MCNC: 24 MG/DL — HIGH (ref 7–23)
CALCIUM SERPL-MCNC: 9.7 MG/DL — SIGNIFICANT CHANGE UP (ref 8.5–10.5)
CHLORIDE SERPL-SCNC: 102 MMOL/L — SIGNIFICANT CHANGE UP (ref 96–108)
CO2 SERPL-SCNC: 27 MMOL/L — SIGNIFICANT CHANGE UP (ref 22–31)
CREAT SERPL-MCNC: 0.76 MG/DL — SIGNIFICANT CHANGE UP (ref 0.5–1.3)
CULTURE RESULTS: SIGNIFICANT CHANGE UP
GLUCOSE SERPL-MCNC: 80 MG/DL — SIGNIFICANT CHANGE UP (ref 70–99)
HCT VFR BLD CALC: 33.4 % — LOW (ref 34.5–45)
HGB BLD-MCNC: 10.1 G/DL — LOW (ref 11.5–15.5)
MAGNESIUM SERPL-MCNC: 2.2 MG/DL — SIGNIFICANT CHANGE UP (ref 1.6–2.4)
MCHC RBC-ENTMCNC: 27.9 PG — SIGNIFICANT CHANGE UP (ref 27–34)
MCHC RBC-ENTMCNC: 30.2 G/DL — LOW (ref 32–36)
MCV RBC AUTO: 92.3 FL — SIGNIFICANT CHANGE UP (ref 80–100)
PHOSPHATE SERPL-MCNC: 3.4 MG/DL — SIGNIFICANT CHANGE UP (ref 2.5–4.5)
PLATELET # BLD AUTO: 273 K/UL — SIGNIFICANT CHANGE UP (ref 150–400)
POTASSIUM SERPL-MCNC: 4.5 MMOL/L — SIGNIFICANT CHANGE UP (ref 3.5–5.3)
POTASSIUM SERPL-SCNC: 4.5 MMOL/L — SIGNIFICANT CHANGE UP (ref 3.5–5.3)
RBC # BLD: 3.62 M/UL — LOW (ref 3.8–5.2)
RBC # FLD: 17.6 % — HIGH (ref 10.3–16.9)
SODIUM SERPL-SCNC: 139 MMOL/L — SIGNIFICANT CHANGE UP (ref 135–145)
SPECIMEN SOURCE: SIGNIFICANT CHANGE UP
WBC # BLD: 6.5 K/UL — SIGNIFICANT CHANGE UP (ref 3.8–10.5)
WBC # FLD AUTO: 6.5 K/UL — SIGNIFICANT CHANGE UP (ref 3.8–10.5)

## 2017-03-04 RX ADMIN — Medication 10 MILLIGRAM(S): at 11:43

## 2017-03-04 RX ADMIN — ESCITALOPRAM OXALATE 20 MILLIGRAM(S): 10 TABLET, FILM COATED ORAL at 11:42

## 2017-03-04 RX ADMIN — Medication 25 MILLIGRAM(S): at 20:37

## 2017-03-04 RX ADMIN — ENOXAPARIN SODIUM 60 MILLIGRAM(S): 100 INJECTION SUBCUTANEOUS at 11:43

## 2017-03-04 RX ADMIN — Medication 25 MILLIGRAM(S): at 07:24

## 2017-03-04 RX ADMIN — PANTOPRAZOLE SODIUM 40 MILLIGRAM(S): 20 TABLET, DELAYED RELEASE ORAL at 11:42

## 2017-03-04 RX ADMIN — Medication 325 MILLIGRAM(S): at 11:42

## 2017-03-04 RX ADMIN — LOSARTAN POTASSIUM 100 MILLIGRAM(S): 100 TABLET, FILM COATED ORAL at 11:42

## 2017-03-04 RX ADMIN — Medication 400 UNIT(S): at 11:41

## 2017-03-04 RX ADMIN — Medication 1 APPLICATION(S): at 12:10

## 2017-03-04 RX ADMIN — Medication 1 TABLET(S): at 11:41

## 2017-03-04 RX ADMIN — Medication 25 MILLIGRAM(S): at 14:41

## 2017-03-04 RX ADMIN — PREGABALIN 250 MICROGRAM(S): 225 CAPSULE ORAL at 11:42

## 2017-03-04 NOTE — PROGRESS NOTE ADULT - ASSESSMENT
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

## 2017-03-04 NOTE — PROGRESS NOTE ADULT - SUBJECTIVE AND OBJECTIVE BOX
O/N: Afebrile, VSS. Vac Changed, incisions repacked.  3/3: Tmax 101.9@9pm. CXR showed LEFT lower lobe consolidation. BCx sent. UA negative. Levquin 750mg qD started stat. Vac changed & incisions repacked. O/N: Afebrile, VSS. Vac Changed, incisions repacked.  3/3: Tmax 101.9@9pm. CXR showed LEFT lower lobe consolidation. BCx sent. UA negative. Levquin 750mg qD started stat. Vac changed & incisions repacked.   INTERVAL HPI/OVERNIGHT EVENTS:    STATUS POST:      POST OPERATIVE DAY #:     SUBJECTIVE:   Pain controlled. Denies CP/SOB/N/V/palpitations  Flatus: [ ] YES [ ] NO             Bowel Movement: [ ] YES [ ] NO      MEDICATIONS  (STANDING):  losartan 100milliGRAM(s) Oral daily  escitalopram 20milliGRAM(s) Oral daily  ALBUTerol    90 MICROgram(s) HFA Inhaler 1Puff(s) Inhalation every 4 hours  cyanocobalamin 250MICROGram(s) Oral daily  cholecalciferol 400Unit(s) Oral daily  multivitamin 1Tablet(s) Oral daily  ferrous    sulfate 325milliGRAM(s) Oral daily  enoxaparin Injectable 60milliGRAM(s) SubCutaneous daily  pantoprazole    Tablet 40milliGRAM(s) Oral daily  collagenase Ointment 1Application(s) Topical daily  bisacodyl 5milliGRAM(s) Oral at bedtime  levoFLOXacin IVPB 750milliGRAM(s) IV Intermittent every 24 hours  levoFLOXacin IVPB  IV Intermittent     MEDICATIONS  (PRN):  acetaminophen   Tablet. 325milliGRAM(s) Oral every 4 hours PRN Moderate Pain (4 - 6)  ondansetron Injectable 4milliGRAM(s) IV Push every 6 hours PRN Nausea  ALBUTerol    0.083% 2.5milliGRAM(s) Nebulizer every 6 hours PRN Shortness of Breath and/or Wheezing  oxybutynin 5milliGRAM(s) Oral two times a day PRN Bladder spasms  diphenhydrAMINE   Capsule 25milliGRAM(s) Oral every 6 hours PRN Rash and/or Itching  diazepam    Tablet 10milliGRAM(s) Oral at bedtime PRN insomnia      Vital Signs Last 24 Hrs  T(C): 37.2, Max: 37.6 (03-03 @ 17:51)  T(F): 99, Max: 99.7 (03-03 @ 17:51)  HR: 63 (63 - 111)  BP: 100/69 (87/55 - 112/79)  BP(mean): --  RR: 16 (16 - 18)  SpO2: 97% (95% - 97%)    Physical Exam:    General: A&Ox3, NAD.   CV: RRR  Pulm: nonlabored breathing  Abd: soft, ND, TTP at wound sites, no guarding, no rebound. Incisions: wound vac to midline incision & ECF changed, now w/ good seal; incisions x 5 w/ thick, cream-colored discharge cleaned & re-packed.  Ext: No edema. WWP.       I&O's Detail    I & Os for current day (as of 04 Mar 2017 08:50)  =============================================  IN:    Oral Fluid: 1320 ml    Total IN: 1320 ml  ---------------------------------------------  OUT:    Voided: 450 ml    VAC (Vacuum Assisted Closure) System: 60 ml    Total OUT: 510 ml  ---------------------------------------------  Total NET: 810 ml      LABS:                        10.1   6.5   )-----------( 273      ( 04 Mar 2017 07:21 )             33.4     04 Mar 2017 07:21    139    |  102    |  24     ----------------------------<  80     4.5     |  27     |  0.76     Ca    9.7        04 Mar 2017 07:21  Phos  3.4       04 Mar 2017 07:21  Mg     2.2       04 Mar 2017 07:21        Urinalysis Basic - ( 03 Mar 2017 02:26 )    Color: Yellow / Appearance: Clear / S.010 / pH: x  Gluc: x / Ketone: NEGATIVE  / Bili: NEGATIVE / Urobili: 0.2 E.U./dL   Blood: x / Protein: NEGATIVE mg/dL / Nitrite: NEGATIVE   Leuk Esterase: NEGATIVE / RBC: < 5 /HPF / WBC < 5 /HPF   Sq Epi: x / Non Sq Epi: Moderate /HPF / Bacteria: Present /HPF        RADIOLOGY & ADDITIONAL STUDIES:

## 2017-03-05 LAB
ANION GAP SERPL CALC-SCNC: 6 MMOL/L — LOW (ref 9–16)
BUN SERPL-MCNC: 30 MG/DL — HIGH (ref 7–23)
CALCIUM SERPL-MCNC: 9.3 MG/DL — SIGNIFICANT CHANGE UP (ref 8.5–10.5)
CHLORIDE SERPL-SCNC: 102 MMOL/L — SIGNIFICANT CHANGE UP (ref 96–108)
CO2 SERPL-SCNC: 28 MMOL/L — SIGNIFICANT CHANGE UP (ref 22–31)
CREAT SERPL-MCNC: 1 MG/DL — SIGNIFICANT CHANGE UP (ref 0.5–1.3)
GLUCOSE SERPL-MCNC: 97 MG/DL — SIGNIFICANT CHANGE UP (ref 70–99)
HCT VFR BLD CALC: 30 % — LOW (ref 34.5–45)
HGB BLD-MCNC: 8.9 G/DL — LOW (ref 11.5–15.5)
MAGNESIUM SERPL-MCNC: 2.2 MG/DL — SIGNIFICANT CHANGE UP (ref 1.6–2.4)
MCHC RBC-ENTMCNC: 27.3 PG — SIGNIFICANT CHANGE UP (ref 27–34)
MCHC RBC-ENTMCNC: 29.7 G/DL — LOW (ref 32–36)
MCV RBC AUTO: 92 FL — SIGNIFICANT CHANGE UP (ref 80–100)
PHOSPHATE SERPL-MCNC: 3.4 MG/DL — SIGNIFICANT CHANGE UP (ref 2.5–4.5)
PLATELET # BLD AUTO: 220 K/UL — SIGNIFICANT CHANGE UP (ref 150–400)
POTASSIUM SERPL-MCNC: 4.7 MMOL/L — SIGNIFICANT CHANGE UP (ref 3.5–5.3)
POTASSIUM SERPL-SCNC: 4.7 MMOL/L — SIGNIFICANT CHANGE UP (ref 3.5–5.3)
RBC # BLD: 3.26 M/UL — LOW (ref 3.8–5.2)
RBC # FLD: 17.5 % — HIGH (ref 10.3–16.9)
SODIUM SERPL-SCNC: 136 MMOL/L — SIGNIFICANT CHANGE UP (ref 135–145)
WBC # BLD: 7.2 K/UL — SIGNIFICANT CHANGE UP (ref 3.8–10.5)
WBC # FLD AUTO: 7.2 K/UL — SIGNIFICANT CHANGE UP (ref 3.8–10.5)

## 2017-03-05 RX ORDER — HYDROXYZINE HCL 10 MG
25 TABLET ORAL EVERY 6 HOURS
Qty: 0 | Refills: 0 | Status: DISCONTINUED | OUTPATIENT
Start: 2017-03-05 | End: 2017-03-13

## 2017-03-05 RX ORDER — ACETAMINOPHEN 500 MG
650 TABLET ORAL EVERY 6 HOURS
Qty: 0 | Refills: 0 | Status: DISCONTINUED | OUTPATIENT
Start: 2017-03-05 | End: 2017-03-13

## 2017-03-05 RX ADMIN — Medication 400 UNIT(S): at 13:32

## 2017-03-05 RX ADMIN — Medication 1 TABLET(S): at 13:32

## 2017-03-05 RX ADMIN — Medication 5 MILLIGRAM(S): at 21:58

## 2017-03-05 RX ADMIN — Medication 25 MILLIGRAM(S): at 13:32

## 2017-03-05 RX ADMIN — ESCITALOPRAM OXALATE 20 MILLIGRAM(S): 10 TABLET, FILM COATED ORAL at 13:32

## 2017-03-05 RX ADMIN — Medication 10 MILLIGRAM(S): at 00:03

## 2017-03-05 RX ADMIN — Medication 25 MILLIGRAM(S): at 21:58

## 2017-03-05 RX ADMIN — Medication 1 APPLICATION(S): at 16:38

## 2017-03-05 RX ADMIN — PREGABALIN 250 MICROGRAM(S): 225 CAPSULE ORAL at 13:32

## 2017-03-05 RX ADMIN — Medication 325 MILLIGRAM(S): at 13:32

## 2017-03-05 RX ADMIN — Medication 25 MILLIGRAM(S): at 02:46

## 2017-03-05 RX ADMIN — PANTOPRAZOLE SODIUM 40 MILLIGRAM(S): 20 TABLET, DELAYED RELEASE ORAL at 13:31

## 2017-03-05 RX ADMIN — ENOXAPARIN SODIUM 60 MILLIGRAM(S): 100 INJECTION SUBCUTANEOUS at 13:33

## 2017-03-05 NOTE — PROGRESS NOTE ADULT - ASSESSMENT
56yoF with PMH HTN, LSG to bypass conversion complicated by multiple leaks and strictures, now s/p revision with course complicated by multiple enterocutaneous fistulae and draining abscesses  Pain/Nausea control  Diet bariatric RD  Lovenox 60QD/SCDs/OOBA/IS  Levaquin day 3  Vac changes PRN  Daily wound packing 56yoF with PMH HTN, LSG to bypass conversion complicated by multiple leaks and strictures, now s/p revision with course complicated by multiple enterocutaneous fistulae and draining abscesses    Pain/Nausea control  Diet bariatric RD  Lovenox 60QD/SCDs/OOBA/IS  Levaquin day 3  Vac changes PRN  Daily wound packing

## 2017-03-06 RX ADMIN — ENOXAPARIN SODIUM 60 MILLIGRAM(S): 100 INJECTION SUBCUTANEOUS at 11:26

## 2017-03-06 RX ADMIN — Medication 400 UNIT(S): at 11:26

## 2017-03-06 RX ADMIN — Medication 25 MILLIGRAM(S): at 06:20

## 2017-03-06 RX ADMIN — Medication 1 TABLET(S): at 11:26

## 2017-03-06 RX ADMIN — Medication 25 MILLIGRAM(S): at 19:24

## 2017-03-06 RX ADMIN — PANTOPRAZOLE SODIUM 40 MILLIGRAM(S): 20 TABLET, DELAYED RELEASE ORAL at 11:26

## 2017-03-06 RX ADMIN — Medication 10 MILLIGRAM(S): at 13:47

## 2017-03-06 RX ADMIN — Medication 1 APPLICATION(S): at 12:30

## 2017-03-06 RX ADMIN — Medication 325 MILLIGRAM(S): at 11:26

## 2017-03-06 RX ADMIN — ESCITALOPRAM OXALATE 20 MILLIGRAM(S): 10 TABLET, FILM COATED ORAL at 11:26

## 2017-03-06 RX ADMIN — Medication 5 MILLIGRAM(S): at 22:18

## 2017-03-06 RX ADMIN — PREGABALIN 250 MICROGRAM(S): 225 CAPSULE ORAL at 11:26

## 2017-03-06 RX ADMIN — Medication 25 MILLIGRAM(S): at 12:48

## 2017-03-06 NOTE — PROGRESS NOTE ADULT - SUBJECTIVE AND OBJECTIVE BOX
O/N: Afebrile, VSS. Wound Vac Changed, incisions re-packed.   3/5: Wound re-dressed/vac changed. BRAYDEN. DAILY PROGRESS NOTE:    INTERVAL HPI / OVERNIGHT EVENTS:  O/N: Afebrile, VSS. Wound Vac Changed, incisions re-packed.   3/5: Wound re-dressed/vac changed. BRAYDEN.     SUBJECTIVE:  Pt seen & examined at bedside. Pain controlled. No complaints. No F/C, N/V, CP/SOB.    MEDICATIONS  (STANDING):  losartan 100milliGRAM(s) Oral daily  escitalopram 20milliGRAM(s) Oral daily  ALBUTerol    90 MICROgram(s) HFA Inhaler 1Puff(s) Inhalation every 4 hours  cyanocobalamin 250MICROGram(s) Oral daily  cholecalciferol 400Unit(s) Oral daily  multivitamin 1Tablet(s) Oral daily  ferrous    sulfate 325milliGRAM(s) Oral daily  enoxaparin Injectable 60milliGRAM(s) SubCutaneous daily  pantoprazole    Tablet 40milliGRAM(s) Oral daily  collagenase Ointment 1Application(s) Topical daily  bisacodyl 5milliGRAM(s) Oral at bedtime  levoFLOXacin IVPB 750milliGRAM(s) IV Intermittent every 24 hours  levoFLOXacin IVPB  IV Intermittent     MEDICATIONS  (PRN):  ondansetron Injectable 4milliGRAM(s) IV Push every 6 hours PRN Nausea  ALBUTerol    0.083% 2.5milliGRAM(s) Nebulizer every 6 hours PRN Shortness of Breath and/or Wheezing  oxybutynin 5milliGRAM(s) Oral two times a day PRN Bladder spasms  diazepam    Tablet 10milliGRAM(s) Oral at bedtime PRN insomnia  oxyCODONE  5 mG/acetaminophen 325 mG 1Tablet(s) Oral every 4 hours PRN Severe Pain (7 - 10)  hydrOXYzine hydrochloride 25milliGRAM(s) Oral every 6 hours PRN Itching  acetaminophen   Tablet. 650milliGRAM(s) Oral every 6 hours PRN Moderate Pain (4 - 6)      Vital Signs Last 24 Hrs  T(C): 36.3, Max: 37.7 (03-05 @ 20:45)  T(F): 97.4, Max: 99.8 (03-05 @ 20:45)  HR: 69 (69 - 88)  BP: 103/71 (102/60 - 110/74)  BP(mean): --  RR: 16 (15 - 18)  SpO2: 96% (95% - 97%)    PHYSICAL EXAM:  Neuro: NAD, A&Ox3  Resp: No incr WOB  CV: NSR, RRR   Abd: Soft, NT, ND. Vac in place w/ good seal. Macerated skin surrounding midline incision vac improved. Incision packing in place w/ clean/dry gauze dressing.   : Voids  Extr: WWP, no edema    I&O's Detail    I & Os for current day (as of 06 Mar 2017 09:26)  =============================================  IN:    Oral Fluid: 900 ml    Solution: 150 ml    Total IN: 1050 ml  ---------------------------------------------  OUT:    Voided: 600 ml    Total OUT: 600 ml  ---------------------------------------------  Total NET: 450 ml      LABS:                        8.9    7.2   )-----------( 220      ( 05 Mar 2017 07:59 )             30.0     05 Mar 2017 07:59    136    |  102    |  30     ----------------------------<  97     4.7     |  28     |  1.00     Ca    9.3        05 Mar 2017 07:59  Phos  3.4       05 Mar 2017 07:59  Mg     2.2       05 Mar 2017 07:59

## 2017-03-06 NOTE — PROGRESS NOTE ADULT - ASSESSMENT
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

## 2017-03-06 NOTE — PROGRESS NOTE ADULT - SUBJECTIVE AND OBJECTIVE BOX
afebrile and eating well  still issue is wound management  seems to have multiple tracts that are coming from the j tube site  there is no infection but still requires multiple dressing changes  this is low output and would not want to make npo  would like to get to point were dressing is once per day and rehab feasible  packed today to base  importance to get to base and allow to granulate from bottom  wbc is normal

## 2017-03-07 LAB
ANION GAP SERPL CALC-SCNC: 8 MMOL/L — LOW (ref 9–16)
BUN SERPL-MCNC: 23 MG/DL — SIGNIFICANT CHANGE UP (ref 7–23)
CALCIUM SERPL-MCNC: 9.7 MG/DL — SIGNIFICANT CHANGE UP (ref 8.5–10.5)
CHLORIDE SERPL-SCNC: 104 MMOL/L — SIGNIFICANT CHANGE UP (ref 96–108)
CO2 SERPL-SCNC: 28 MMOL/L — SIGNIFICANT CHANGE UP (ref 22–31)
CREAT SERPL-MCNC: 0.78 MG/DL — SIGNIFICANT CHANGE UP (ref 0.5–1.3)
CULTURE RESULTS: SIGNIFICANT CHANGE UP
CULTURE RESULTS: SIGNIFICANT CHANGE UP
GLUCOSE SERPL-MCNC: 82 MG/DL — SIGNIFICANT CHANGE UP (ref 70–99)
HCT VFR BLD CALC: 30 % — LOW (ref 34.5–45)
HGB BLD-MCNC: 9.3 G/DL — LOW (ref 11.5–15.5)
MAGNESIUM SERPL-MCNC: 2.4 MG/DL — SIGNIFICANT CHANGE UP (ref 1.6–2.4)
MCHC RBC-ENTMCNC: 28.4 PG — SIGNIFICANT CHANGE UP (ref 27–34)
MCHC RBC-ENTMCNC: 31 G/DL — LOW (ref 32–36)
MCV RBC AUTO: 91.7 FL — SIGNIFICANT CHANGE UP (ref 80–100)
PHOSPHATE SERPL-MCNC: 3.9 MG/DL — SIGNIFICANT CHANGE UP (ref 2.5–4.5)
PLATELET # BLD AUTO: 202 K/UL — SIGNIFICANT CHANGE UP (ref 150–400)
POTASSIUM SERPL-MCNC: 4.6 MMOL/L — SIGNIFICANT CHANGE UP (ref 3.5–5.3)
POTASSIUM SERPL-SCNC: 4.6 MMOL/L — SIGNIFICANT CHANGE UP (ref 3.5–5.3)
RBC # BLD: 3.27 M/UL — LOW (ref 3.8–5.2)
RBC # FLD: 17.1 % — HIGH (ref 10.3–16.9)
SODIUM SERPL-SCNC: 140 MMOL/L — SIGNIFICANT CHANGE UP (ref 135–145)
SPECIMEN SOURCE: SIGNIFICANT CHANGE UP
SPECIMEN SOURCE: SIGNIFICANT CHANGE UP
WBC # BLD: 5.9 K/UL — SIGNIFICANT CHANGE UP (ref 3.8–10.5)
WBC # FLD AUTO: 5.9 K/UL — SIGNIFICANT CHANGE UP (ref 3.8–10.5)

## 2017-03-07 RX ADMIN — PREGABALIN 250 MICROGRAM(S): 225 CAPSULE ORAL at 13:00

## 2017-03-07 RX ADMIN — Medication 25 MILLIGRAM(S): at 19:11

## 2017-03-07 RX ADMIN — ESCITALOPRAM OXALATE 20 MILLIGRAM(S): 10 TABLET, FILM COATED ORAL at 13:00

## 2017-03-07 RX ADMIN — Medication 325 MILLIGRAM(S): at 13:00

## 2017-03-07 RX ADMIN — Medication 25 MILLIGRAM(S): at 01:55

## 2017-03-07 RX ADMIN — Medication 400 UNIT(S): at 13:00

## 2017-03-07 RX ADMIN — Medication 10 MILLIGRAM(S): at 22:22

## 2017-03-07 RX ADMIN — Medication 1 TABLET(S): at 13:00

## 2017-03-07 RX ADMIN — Medication 1 APPLICATION(S): at 16:17

## 2017-03-07 RX ADMIN — ENOXAPARIN SODIUM 60 MILLIGRAM(S): 100 INJECTION SUBCUTANEOUS at 12:59

## 2017-03-07 RX ADMIN — Medication 5 MILLIGRAM(S): at 22:12

## 2017-03-07 RX ADMIN — PANTOPRAZOLE SODIUM 40 MILLIGRAM(S): 20 TABLET, DELAYED RELEASE ORAL at 13:00

## 2017-03-07 RX ADMIN — Medication 25 MILLIGRAM(S): at 13:00

## 2017-03-07 NOTE — PROGRESS NOTE ADULT - ASSESSMENT
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

## 2017-03-07 NOTE — PROGRESS NOTE ADULT - SUBJECTIVE AND OBJECTIVE BOX
O/N: Afebrile, VSS. Incisions repacked.   3/6: Vac & packing change. Wound care to comment on nidhi-wound skin erythema/maceration. DAILY PROGRESS NOTE:    INTERVAL HPI / OVERNIGHT EVENTS:  O/N: Afebrile, VSS. Incisions repacked.   3/6: Vac & packing change. Wound care to comment on nidhi-wound skin erythema/maceration.     SUBJECTIVE:  Pt seen & examined at bedside. Pain controlled. +Bowel function. +Voiding. No complaints. No F/C, N/V, CP/SOB.    MEDICATIONS  (STANDING):  losartan 100milliGRAM(s) Oral daily  escitalopram 20milliGRAM(s) Oral daily  ALBUTerol    90 MICROgram(s) HFA Inhaler 1Puff(s) Inhalation every 4 hours  cyanocobalamin 250MICROGram(s) Oral daily  cholecalciferol 400Unit(s) Oral daily  multivitamin 1Tablet(s) Oral daily  ferrous    sulfate 325milliGRAM(s) Oral daily  enoxaparin Injectable 60milliGRAM(s) SubCutaneous daily  pantoprazole    Tablet 40milliGRAM(s) Oral daily  collagenase Ointment 1Application(s) Topical daily  bisacodyl 5milliGRAM(s) Oral at bedtime  levoFLOXacin IVPB 750milliGRAM(s) IV Intermittent every 24 hours  levoFLOXacin IVPB  IV Intermittent     MEDICATIONS  (PRN):  ondansetron Injectable 4milliGRAM(s) IV Push every 6 hours PRN Nausea  ALBUTerol    0.083% 2.5milliGRAM(s) Nebulizer every 6 hours PRN Shortness of Breath and/or Wheezing  oxybutynin 5milliGRAM(s) Oral two times a day PRN Bladder spasms  diazepam    Tablet 10milliGRAM(s) Oral at bedtime PRN insomnia  oxyCODONE  5 mG/acetaminophen 325 mG 1Tablet(s) Oral every 4 hours PRN Severe Pain (7 - 10)  hydrOXYzine hydrochloride 25milliGRAM(s) Oral every 6 hours PRN Itching  acetaminophen   Tablet. 650milliGRAM(s) Oral every 6 hours PRN Moderate Pain (4 - 6)    Vital Signs Last 24 Hrs  T(C): 36.8, Max: 38.1 (03-06 @ 17:26)  T(F): 98.2, Max: 100.5 (03-06 @ 17:26)  HR: 78 (70 - 100)  BP: 97/66 (93/66 - 102/73)  BP(mean): --  RR: 17 (16 - 17)  SpO2: 97% (95% - 98%)    PHYSICAL EXAM:  Neuro: NAD, A&Ox3  Resp: No incr WOB  CV: NSR, RRR   Abd: Soft, obese, mildly TTP near. Midline wound vac in place w/ good seal. Has been reinforced w/ additional plastic since placement yesterday AM w/ Dr. Brady. Current extent of coverage has trapped drainage in umbilicus. Skin appear erythematous w/ pustules. Packing in other incisions changed overnight. Overlying gauze dry/intact w/ stains.   : Voids  Extr: WWP, no edema    I&O's Detail    I & Os for current day (as of 07 Mar 2017 07:04)  =============================================  IN:    Oral Fluid: 480 ml    Total IN: 480 ml  ---------------------------------------------  OUT:    Voided: 400 ml    Total OUT: 400 ml  ---------------------------------------------  Total NET: 80 ml      LABS:                        8.9    7.2   )-----------( 220      ( 05 Mar 2017 07:59 )             30.0     05 Mar 2017 07:59    136    |  102    |  30     ----------------------------<  97     4.7     |  28     |  1.00     Ca    9.3        05 Mar 2017 07:59  Phos  3.4       05 Mar 2017 07:59  Mg     2.2       05 Mar 2017 07:59

## 2017-03-08 ENCOUNTER — TRANSCRIPTION ENCOUNTER (OUTPATIENT)
Age: 57
End: 2017-03-08

## 2017-03-08 LAB
ALBUMIN SERPL ELPH-MCNC: 2.4 G/DL — LOW (ref 3.4–5)
ALP SERPL-CCNC: 113 U/L — SIGNIFICANT CHANGE UP (ref 40–120)
ALT FLD-CCNC: 12 U/L — SIGNIFICANT CHANGE UP (ref 12–42)
ANION GAP SERPL CALC-SCNC: 8 MMOL/L — LOW (ref 9–16)
AST SERPL-CCNC: 8 U/L — LOW (ref 15–37)
BILIRUB SERPL-MCNC: 0.3 MG/DL — SIGNIFICANT CHANGE UP (ref 0.2–1.2)
BUN SERPL-MCNC: 23 MG/DL — SIGNIFICANT CHANGE UP (ref 7–23)
CALCIUM SERPL-MCNC: 9.4 MG/DL — SIGNIFICANT CHANGE UP (ref 8.5–10.5)
CHLORIDE SERPL-SCNC: 104 MMOL/L — SIGNIFICANT CHANGE UP (ref 96–108)
CO2 SERPL-SCNC: 27 MMOL/L — SIGNIFICANT CHANGE UP (ref 22–31)
CREAT SERPL-MCNC: 0.81 MG/DL — SIGNIFICANT CHANGE UP (ref 0.5–1.3)
GLUCOSE SERPL-MCNC: 90 MG/DL — SIGNIFICANT CHANGE UP (ref 70–99)
HCT VFR BLD CALC: 29.2 % — LOW (ref 34.5–45)
HGB BLD-MCNC: 9 G/DL — LOW (ref 11.5–15.5)
MAGNESIUM SERPL-MCNC: 2.1 MG/DL — SIGNIFICANT CHANGE UP (ref 1.6–2.4)
MCHC RBC-ENTMCNC: 28 PG — SIGNIFICANT CHANGE UP (ref 27–34)
MCHC RBC-ENTMCNC: 30.8 G/DL — LOW (ref 32–36)
MCV RBC AUTO: 90.7 FL — SIGNIFICANT CHANGE UP (ref 80–100)
PHOSPHATE SERPL-MCNC: 3.3 MG/DL — SIGNIFICANT CHANGE UP (ref 2.5–4.5)
PLATELET # BLD AUTO: 195 K/UL — SIGNIFICANT CHANGE UP (ref 150–400)
POTASSIUM SERPL-MCNC: 3.9 MMOL/L — SIGNIFICANT CHANGE UP (ref 3.5–5.3)
POTASSIUM SERPL-SCNC: 3.9 MMOL/L — SIGNIFICANT CHANGE UP (ref 3.5–5.3)
PREALB SERPL-MCNC: 17 MG/DL — LOW (ref 20–40)
PROT SERPL-MCNC: 6.5 G/DL — SIGNIFICANT CHANGE UP (ref 6.4–8.2)
RBC # BLD: 3.22 M/UL — LOW (ref 3.8–5.2)
RBC # FLD: 16.9 % — SIGNIFICANT CHANGE UP (ref 10.3–16.9)
SODIUM SERPL-SCNC: 139 MMOL/L — SIGNIFICANT CHANGE UP (ref 135–145)
WBC # BLD: 5.5 K/UL — SIGNIFICANT CHANGE UP (ref 3.8–10.5)
WBC # FLD AUTO: 5.5 K/UL — SIGNIFICANT CHANGE UP (ref 3.8–10.5)

## 2017-03-08 RX ORDER — DIPHENHYDRAMINE HCL 50 MG
50 CAPSULE ORAL EVERY 4 HOURS
Qty: 0 | Refills: 0 | Status: DISCONTINUED | OUTPATIENT
Start: 2017-03-08 | End: 2017-03-13

## 2017-03-08 RX ADMIN — Medication 1 TABLET(S): at 12:57

## 2017-03-08 RX ADMIN — ESCITALOPRAM OXALATE 20 MILLIGRAM(S): 10 TABLET, FILM COATED ORAL at 12:57

## 2017-03-08 RX ADMIN — Medication 400 UNIT(S): at 12:57

## 2017-03-08 RX ADMIN — Medication 50 MILLIGRAM(S): at 20:47

## 2017-03-08 RX ADMIN — Medication 10 MILLIGRAM(S): at 13:45

## 2017-03-08 RX ADMIN — Medication 25 MILLIGRAM(S): at 13:02

## 2017-03-08 RX ADMIN — PREGABALIN 250 MICROGRAM(S): 225 CAPSULE ORAL at 12:56

## 2017-03-08 RX ADMIN — Medication 325 MILLIGRAM(S): at 12:57

## 2017-03-08 RX ADMIN — Medication 25 MILLIGRAM(S): at 02:09

## 2017-03-08 RX ADMIN — PANTOPRAZOLE SODIUM 40 MILLIGRAM(S): 20 TABLET, DELAYED RELEASE ORAL at 12:57

## 2017-03-08 RX ADMIN — Medication 1 APPLICATION(S): at 12:58

## 2017-03-08 RX ADMIN — ENOXAPARIN SODIUM 60 MILLIGRAM(S): 100 INJECTION SUBCUTANEOUS at 12:57

## 2017-03-08 NOTE — DISCHARGE NOTE ADULT - CARE PROVIDER_API CALL
Miguel Brady (MD), Surgery  [address] [address]  Phone: [phone]  Fax: [phone]

## 2017-03-08 NOTE — DISCHARGE NOTE ADULT - HOSPITAL COURSE
56F w/PMHx of HTN, gastric bypass in 2002 c/b stricture, corkscrewed sleeve and chronic leak, revised on 11/28/16 with protracted (70-day) postoperative course complicated by MDR infections, a C-diff infection, respiratory compromise due to plugging/PNA/effusions requiring multiple interventions and a trach, as well as a continued leak requiring a draining double-pigtail with stenting performed by GI. She was discharged to rehab roughly 2 weeks prior to her most recent admission. She initially improved but then became febrile. She was seen by Dr. Brady in clinic on 2/15/17 where she was found to have a right upper quadrant abscess that was incised and drained. She was also found to be hypotensive and thus was sent to [facility] ED for IV fluid resuscitation and further imaging. A CT scan of her abdomen and pelvis showed a large left lower quadrant intraperitoneal abscess associated with a subcutaneous air pocket. She was admitted to General Surgery for further management. She was given liquid Augmentin and started on a regular diet with continued tube feeds. A PICC line was placed by IR for peripheral parenteral nutrition (PPN). On 2/22/17, her J tube was removed and PPN was stopped. 56F w/PMHx of HTN, gastric bypass in 2002 c/b stricture, corkscrewed sleeve and chronic leak, revised on 11/28/16 with protracted (70-day) postoperative course complicated by MDR infections, a C-diff infection, respiratory compromise due to plugging/PNA/effusions requiring multiple interventions and a trach, as well as a continued leak requiring a draining double-pigtail with stenting performed by GI. She was discharged to rehab roughly 2 weeks prior to her most recent admission. She initially improved but then became febrile. She was seen by Dr. Brady in clinic on 2/15/17 where she was found to have a right upper quadrant abscess that was incised and drained. She was also found to be hypotensive and thus was sent to [facility] ED for IV fluid resuscitation and further imaging. A CT scan of her abdomen and pelvis showed a large left lower quadrant intraperitoneal abscess associated with a subcutaneous air pocket. She was admitted to General Surgery for further management. She was given a course of liquid Augmentin and started on a regular diet with continued tube feeds. A PICC line was placed by IR for peripheral parenteral nutrition (PPN). On 2/22/17, her J tube was removed and PPN was stopped. She underwent daily wound care. A wound vac was applied to her midline wound with a piece of white sponge placed in the fistula tract, and her other incisions were packed with iodoform gauze. Overnight 3/2/17, she became febrile and a chest x-ray showed a left lower lobe consolidation thus she was started on Levaquin IV daily. Blood cultures and a urinalysis were negative. Daily would care continued throughout her admission and the output from her wound vac decreased. The patient was admitted to [facility] for surgery. She continues tolerating a regular diet without nausea/vomiting, ambulating, and voiding spontaneously with bowel function. Her pain is well controlled on oral pain medication. She has been deemed ready for discharge and will follow up with the surgeon.

## 2017-03-08 NOTE — PROGRESS NOTE ADULT - SUBJECTIVE AND OBJECTIVE BOX
O/N: Afebrile, VSS. Incisions repacked  3/7: Wound vac changed & incisions re-packed. VSS O/N: Afebrile, VSS. Incisions repacked  3/7: Wound vac changed & incisions re-packed. VSS          SUBJECTIVE: Patient denies any complaints  Flatus: [] YES [X] NO             Bowel Movement: [ ] YES [X ] NO  Pain (0-10):            Pain Control Adequate: [X ] YES [ ] NO  Nausea: [ ] YES [X ] NO            Vomiting: [ ] YES [X ] NO  Diarrhea: [ ] YES [X ] NO         Constipation: [ ] YES [X ] NO     Chest Pain: [ ] YES [X ] NO    SOB:  [ ] YES [ X] NO    losartan 100milliGRAM(s) Oral daily  enoxaparin Injectable 60milliGRAM(s) SubCutaneous daily  levoFLOXacin IVPB 750milliGRAM(s) IV Intermittent every 24 hours  levoFLOXacin IVPB  IV Intermittent       Vital Signs Last 24 Hrs  T(C): 36.9, Max: 37.6 (03-07 @ 21:03)  T(F): 98.5, Max: 99.7 (03-07 @ 21:03)  HR: 65 (65 - 98)  BP: 106/69 (89/61 - 120/70)  BP(mean): --  RR: 17 (16 - 17)  SpO2: 97% (95% - 97%)  I&O's Detail    I & Os for current day (as of 08 Mar 2017 08:07)  =============================================  IN:    Oral Fluid: 1170 ml    Solution: 150 ml    Total IN: 1320 ml  ---------------------------------------------  OUT:    Voided: 950 ml    VAC (Vacuum Assisted Closure) System: 150 ml    Total OUT: 1100 ml  ---------------------------------------------  Total NET: 220 ml      General: NAD, resting comfortably in bed  C/V: NSR  Pulm: Nonlabored breathing, no respiratory distress  Abd: soft, non distended , non tender  Extrem: WWP, no edema, SCDs in place  Wound vac functioning         LABS:                        9.0    5.5   )-----------( 195      ( 08 Mar 2017 07:16 )             29.2     08 Mar 2017 07:16    139    |  104    |  23     ----------------------------<  90     3.9     |  27     |  0.81     Ca    9.4        08 Mar 2017 07:16  Phos  3.3       08 Mar 2017 07:16  Mg     2.1       08 Mar 2017 07:16    TPro  6.5    /  Alb  2.4    /  TBili  0.3    /  DBili  x      /  AST  8      /  ALT  12     /  AlkPhos  113    08 Mar 2017 07:16          RADIOLOGY & ADDITIONAL STUDIES:

## 2017-03-08 NOTE — DISCHARGE NOTE ADULT - MEDICATION SUMMARY - MEDICATIONS TO TAKE
I will START or STAY ON the medications listed below when I get home from the hospital:    acetaminophen 325 mg oral tablet  -- 1 tab(s) by mouth every 4 hours, As needed, Moderate Pain (4 - 6)  -- Indication: For Pain    acetaminophen-oxyCODONE 325 mg-5 mg oral tablet  -- 1 tab(s) by mouth every 4 hours, As needed, Severe Pain (7 - 10)  -- Indication: For Pain    losartan 100 mg oral tablet  -- 1 tab(s) by mouth once a day  -- Indication: For HTN    enoxaparin  -- 80 unit(s) subcutaneous once a day  -- Indication: For Anticoagulation    Valium 10 mg oral tablet  -- 1 tab(s) by mouth once a day (at bedtime)  -- Indication: For Anxiety    Lexapro 20 mg oral tablet  -- 1 tab(s) by mouth once a day  -- Indication: For Depression    Zofran ODT 4 mg oral tablet, disintegrating  -- 1 tab(s) by mouth 3 times a day, As Needed -for nausea  -- Indication: For Nausea    diphenhydrAMINE 25 mg oral capsule  -- 1 cap(s) by mouth once a day (at bedtime), As needed, Insomnia  -- Indication: For Insomnia    albuterol-ipratropium 2.5 mg-0.5 mg/3 mL inhalation solution  -- 3 milliliter(s) inhaled every 6 hours  -- Indication: For Resp agent    meropenem 1000 mg intravenous injection  -- 2000 milligram(s) intravenous every 8 hours  -- Indication: For STOP TAKING    nystatin 100,000 units/g topical powder  -- 1 application on skin once a day  -- Indication: For Topical derm agent    hydrocortisone 1% topical cream  -- 1 application on skin 2 times a day  -- Indication: For Topical derm agent    collagenase 250 units/g topical ointment  -- 1 application on skin once a day  -- Indication: For Topical derm agent    vancomycin 250 mg/5 mL oral solution  -- 2.5 milliliter(s) by mouth every 6 hours  -- Indication: For STOP TAKING    ferrous sulfate 300 mg/5 mL (60 mg elemental iron) oral liquid  -- 5 milliliter(s) by mouth once a day  -- Indication: For Nutritional support    zinc sulfate 220 mg oral capsule  -- 1 cap(s) by mouth once a day  -- Indication: For Nutritional support    Coly Mycin M 150 mg (colistin base) injection  -- 150 milligram(s) injectable once a day  -- Indication: For Anti-infective agent    Protonix 40 mg oral granule, enteric coated  -- 1 each by mouth once a day  -- Indication: For GERD    VESIcare 5 mg oral tablet  -- 1 tab(s) by mouth once a day  -- Indication: For  agent    Multiple Vitamins oral tablet  -- 1 tab(s) by mouth once a day  -- Indication: For Nutritional support    Vitamin B-12 250 mcg oral tablet  -- 1 tab(s) by mouth once a day  -- Indication: For Nutritional support    cholecalciferol oral tablet  -- 2000 unit(s) by mouth once a day  -- Indication: For Nutritional support    thiamine 100 mg oral tablet  -- 1 tab(s) by mouth once a day  -- Indication: For Nutritional support I will START or STAY ON the medications listed below when I get home from the hospital:    acetaminophen 325 mg oral tablet  -- 1 tab(s) by mouth every 4 hours, As needed, Moderate Pain (4 - 6)  -- Indication: For Pain    acetaminophen-oxyCODONE 325 mg-5 mg oral tablet  -- 1 tab(s) by mouth every 4 hours, As needed, Severe Pain (7 - 10)  -- Indication: For Pain    losartan 100 mg oral tablet  -- 1 tab(s) by mouth once a day  -- Indication: For HTN    enoxaparin 60 mg/0.6 mL injectable solution  -- 1 dose(s) injectable once a day MDD:1 shot daily  -- It is very important that you take or use this exactly as directed.  Do not skip doses or discontinue unless directed by your doctor.    -- Indication: For Anticoagulation    Valium 10 mg oral tablet  -- 1 tab(s) by mouth once a day (at bedtime)  -- Indication: For Anxiety    Lexapro 20 mg oral tablet  -- 1 tab(s) by mouth once a day  -- Indication: For Depression    Zofran ODT 4 mg oral tablet, disintegrating  -- 1 tab(s) by mouth 3 times a day, As Needed -for nausea  -- Indication: For Nausea    diphenhydrAMINE 25 mg oral capsule  -- 1 cap(s) by mouth once a day (at bedtime), As needed, Insomnia  -- Indication: For Insomnia    albuterol-ipratropium 2.5 mg-0.5 mg/3 mL inhalation solution  -- 3 milliliter(s) inhaled every 6 hours  -- Indication: For Resp agent    nystatin 100,000 units/g topical powder  -- 1 application on skin once a day  -- Indication: For Topical derm agent    hydrocortisone 1% topical cream  -- 1 application on skin 2 times a day  -- Indication: For Topical derm agent    collagenase 250 units/g topical ointment  -- 1 application on skin once a day  -- Indication: For Topical derm agent    ferrous sulfate 300 mg/5 mL (60 mg elemental iron) oral liquid  -- 5 milliliter(s) by mouth once a day  -- Indication: For Nutritional support    bisacodyl 10 mg rectal suppository  -- 1 suppository(ies) rectally once a day, As needed, Constipation MDD:1  -- Indication: For Constipation    zinc sulfate 220 mg oral capsule  -- 1 cap(s) by mouth once a day  -- Indication: For Nutritional support    Coly Mycin M 150 mg (colistin base) injection  -- 150 milligram(s) injectable once a day  -- Indication: For Anti-infective agent    Protonix 40 mg oral granule, enteric coated  -- 1 each by mouth once a day  -- Indication: For GERD    levoFLOXacin 750 mg oral tablet  -- 1 tab(s) by mouth every 24 hours  -- Indication: For Antibiosis    VESIcare 5 mg oral tablet  -- 1 tab(s) by mouth once a day  -- Indication: For  agent    Multiple Vitamins oral tablet  -- 1 tab(s) by mouth once a day  -- Indication: For Nutritional support    Vitamin B-12 250 mcg oral tablet  -- 1 tab(s) by mouth once a day  -- Indication: For Nutritional support    cholecalciferol oral tablet  -- 2000 unit(s) by mouth once a day  -- Indication: For Nutritional support    thiamine 100 mg oral tablet  -- 1 tab(s) by mouth once a day  -- Indication: For Nutritional support

## 2017-03-08 NOTE — DISCHARGE NOTE ADULT - CARE PROVIDERS DIRECT ADDRESSES
[email or web address]

## 2017-03-08 NOTE — PROGRESS NOTE ADULT - ASSESSMENT
56yoF with PMH HTN, LSG converted to RYGB (2002) complicated by stricture and multiple leaks s/p most recent revision (2016) admitted with abdominal abscesses and fistulae  Pain/Nausea control  Diet bariatric diet  [id number]/SCDs/OSHANICE/IS  levaquin 56yoF with PMH HTN, LSG converted to RYGB (2002) complicated by stricture and multiple leaks s/p most recent revision (2016) admitted with abdominal abscesses and fistulae  Pain/Nausea control  Diet bariatric diet  [id number]/SCDs/OOBA/IS  levaquin  disposition planning

## 2017-03-08 NOTE — DISCHARGE NOTE ADULT - MEDICATION SUMMARY - MEDICATIONS TO STOP TAKING
I will STOP taking the medications listed below when I get home from the hospital:    meropenem 1000 mg intravenous injection  -- 2000 milligram(s) intravenous every 8 hours    vancomycin 250 mg/5 mL oral solution  -- 2.5 milliliter(s) by mouth every 6 hours

## 2017-03-08 NOTE — DISCHARGE NOTE ADULT - ADDITIONAL INSTRUCTIONS
Please change midline abdominal wound vac every other day.  Please pack 3 small abdominal wounds with Iodoform dressing once a day.

## 2017-03-08 NOTE — DISCHARGE NOTE ADULT - PLAN OF CARE
Recovery Please change wound vac dressing every other day.  Please repack  3 small tracks on the abdomen with Iodoform dressing once a day. Please change wound vac dressing every other day.  Please repack  3 small tracks on the abdomen with Iodoform dressing once a day.  Follow up with Dr. Brady in 1 week. Call the office at  to schedule your appointment.   Contact your doctor or go to ER for worsening/persistent pain not controlled by medication, excessive bleeding, fever >101.5F, chills, nausea, vomiting, shortness of breath or chest pain. Please change wound vac dressing every other day.  Please repack  3 small tracks on the abdomen with Iodoform dressing once a day.  Please complete 3 more weeks of Levaquin daily. Continue daily Ducolax suppository.   Follow up with Dr. Brady in 1 week. Call the office at  to schedule your appointment.   Contact your doctor or go to ER for worsening/persistent pain not controlled by medication, excessive bleeding, fever >101.5F, chills, nausea, vomiting, shortness of breath or chest pain.

## 2017-03-08 NOTE — DISCHARGE NOTE ADULT - MEDICATION SUMMARY - MEDICATIONS TO CHANGE
I will SWITCH the dose or number of times a day I take the medications listed below when I get home from the hospital:  None I will SWITCH the dose or number of times a day I take the medications listed below when I get home from the hospital:    enoxaparin  -- 80 unit(s) subcutaneous once a day

## 2017-03-08 NOTE — DISCHARGE NOTE ADULT - REASON FOR ADMISSION
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

## 2017-03-08 NOTE — DISCHARGE NOTE ADULT - PATIENT PORTAL LINK FT
“You can access the [employer] Patient Portal, offered by [facility], by registering with the following website: [email or web address]”

## 2017-03-08 NOTE — PROGRESS NOTE ADULT - SUBJECTIVE AND OBJECTIVE BOX
afeb vss  will check dressing  ablumin is 2.4 with bmi in 20's thus think eating ok  will keep present managment as not grea candidate for tpn unless absolutely necessary

## 2017-03-08 NOTE — DISCHARGE NOTE ADULT - CARE PLAN
Principal Discharge DX:	Abscess  Goal:	Recovery  Instructions for follow-up, activity and diet:	Please change wound vac dressing every other day.  Please repack  3 small tracks on the abdomen with Iodoform dressing once a day. Principal Discharge DX:	Abscess  Goal:	Recovery  Instructions for follow-up, activity and diet:	Please change wound vac dressing every other day.  Please repack  3 small tracks on the abdomen with Iodoform dressing once a day.  Follow up with Dr. Brady in 1 week. Call the office at  to schedule your appointment.   Contact your doctor or go to ER for worsening/persistent pain not controlled by medication, excessive bleeding, fever >101.5F, chills, nausea, vomiting, shortness of breath or chest pain. Principal Discharge DX:	Abscess  Goal:	Recovery  Instructions for follow-up, activity and diet:	Please change wound vac dressing every other day.  Please repack  3 small tracks on the abdomen with Iodoform dressing once a day.  Please complete 3 more weeks of Levaquin daily. Continue daily Ducolax suppository.   Follow up with Dr. Brady in 1 week. Call the office at  to schedule your appointment.   Contact your doctor or go to ER for worsening/persistent pain not controlled by medication, excessive bleeding, fever >101.5F, chills, nausea, vomiting, shortness of breath or chest pain.

## 2017-03-09 LAB
ANION GAP SERPL CALC-SCNC: 9 MMOL/L — SIGNIFICANT CHANGE UP (ref 9–16)
BUN SERPL-MCNC: 23 MG/DL — SIGNIFICANT CHANGE UP (ref 7–23)
CALCIUM SERPL-MCNC: 9.6 MG/DL — SIGNIFICANT CHANGE UP (ref 8.5–10.5)
CHLORIDE SERPL-SCNC: 106 MMOL/L — SIGNIFICANT CHANGE UP (ref 96–108)
CO2 SERPL-SCNC: 26 MMOL/L — SIGNIFICANT CHANGE UP (ref 22–31)
CREAT SERPL-MCNC: 0.82 MG/DL — SIGNIFICANT CHANGE UP (ref 0.5–1.3)
GLUCOSE SERPL-MCNC: 80 MG/DL — SIGNIFICANT CHANGE UP (ref 70–99)
HCT VFR BLD CALC: 31.4 % — LOW (ref 34.5–45)
HGB BLD-MCNC: 9.6 G/DL — LOW (ref 11.5–15.5)
MAGNESIUM SERPL-MCNC: 2.1 MG/DL — SIGNIFICANT CHANGE UP (ref 1.6–2.4)
MCHC RBC-ENTMCNC: 27.9 PG — SIGNIFICANT CHANGE UP (ref 27–34)
MCHC RBC-ENTMCNC: 30.6 G/DL — LOW (ref 32–36)
MCV RBC AUTO: 91.3 FL — SIGNIFICANT CHANGE UP (ref 80–100)
PHOSPHATE SERPL-MCNC: 3.1 MG/DL — SIGNIFICANT CHANGE UP (ref 2.5–4.5)
PLATELET # BLD AUTO: 203 K/UL — SIGNIFICANT CHANGE UP (ref 150–400)
POTASSIUM SERPL-MCNC: 4 MMOL/L — SIGNIFICANT CHANGE UP (ref 3.5–5.3)
POTASSIUM SERPL-SCNC: 4 MMOL/L — SIGNIFICANT CHANGE UP (ref 3.5–5.3)
RBC # BLD: 3.44 M/UL — LOW (ref 3.8–5.2)
RBC # FLD: 16.7 % — SIGNIFICANT CHANGE UP (ref 10.3–16.9)
SODIUM SERPL-SCNC: 141 MMOL/L — SIGNIFICANT CHANGE UP (ref 135–145)
WBC # BLD: 5.6 K/UL — SIGNIFICANT CHANGE UP (ref 3.8–10.5)
WBC # FLD AUTO: 5.6 K/UL — SIGNIFICANT CHANGE UP (ref 3.8–10.5)

## 2017-03-09 RX ORDER — DIAZEPAM 5 MG
10 TABLET ORAL AT BEDTIME
Qty: 0 | Refills: 0 | Status: DISCONTINUED | OUTPATIENT
Start: 2017-03-09 | End: 2017-03-13

## 2017-03-09 RX ADMIN — Medication 50 MILLIGRAM(S): at 23:41

## 2017-03-09 RX ADMIN — PANTOPRAZOLE SODIUM 40 MILLIGRAM(S): 20 TABLET, DELAYED RELEASE ORAL at 13:04

## 2017-03-09 RX ADMIN — ENOXAPARIN SODIUM 60 MILLIGRAM(S): 100 INJECTION SUBCUTANEOUS at 12:03

## 2017-03-09 RX ADMIN — Medication 5 MILLIGRAM(S): at 22:07

## 2017-03-09 RX ADMIN — Medication 325 MILLIGRAM(S): at 13:03

## 2017-03-09 RX ADMIN — Medication 1 APPLICATION(S): at 12:02

## 2017-03-09 RX ADMIN — Medication 1 TABLET(S): at 13:03

## 2017-03-09 RX ADMIN — Medication 400 UNIT(S): at 13:02

## 2017-03-09 RX ADMIN — Medication 50 MILLIGRAM(S): at 06:55

## 2017-03-09 RX ADMIN — PREGABALIN 250 MICROGRAM(S): 225 CAPSULE ORAL at 13:03

## 2017-03-09 RX ADMIN — Medication 10 MILLIGRAM(S): at 02:06

## 2017-03-09 RX ADMIN — Medication 50 MILLIGRAM(S): at 13:13

## 2017-03-09 RX ADMIN — ESCITALOPRAM OXALATE 20 MILLIGRAM(S): 10 TABLET, FILM COATED ORAL at 13:03

## 2017-03-09 RX ADMIN — LOSARTAN POTASSIUM 100 MILLIGRAM(S): 100 TABLET, FILM COATED ORAL at 13:03

## 2017-03-09 RX ADMIN — Medication 10 MILLIGRAM(S): at 22:07

## 2017-03-09 RX ADMIN — Medication 25 MILLIGRAM(S): at 15:04

## 2017-03-09 RX ADMIN — Medication 50 MILLIGRAM(S): at 19:43

## 2017-03-09 NOTE — PROGRESS NOTE ADULT - ASSESSMENT
56yoF with PMH HTN, LSG converted to RYGB (2002) complicated by stricture and multiple leaks s/p most recent revision (2016) admitted with abdominal abscesses and fistulae  Pain/Nausea control  Diet bariatric diet  [id number]/SCDs/OOBA/IS  levaquin  disposition planning

## 2017-03-09 NOTE — PROVIDER CONTACT NOTE (OTHER) - ACTION/TREATMENT ORDERED:
Reinforced dsg w/tegaderm applied to the wound vac dsg as per MD Meyers's order. Checked wound vac, no indication of leaks noted.

## 2017-03-09 NOTE — PROGRESS NOTE ADULT - SUBJECTIVE AND OBJECTIVE BOX
O/N: dressing changed  3/8: incisions repacked DAILY PROGRESS NOTE:    INTERVAL HPI / OVERNIGHT EVENTS:  O/N: dressing changed  3/8: incisions repacked    SUBJECTIVE:  Pt seen & examined at bedside. Pain controlled. +Bowel function. +Voiding. No complaints. No F/C, N/V, CP/SOB.    MEDICATIONS  (STANDING):  losartan 100milliGRAM(s) Oral daily  escitalopram 20milliGRAM(s) Oral daily  ALBUTerol    90 MICROgram(s) HFA Inhaler 1Puff(s) Inhalation every 4 hours  cyanocobalamin 250MICROGram(s) Oral daily  cholecalciferol 400Unit(s) Oral daily  multivitamin 1Tablet(s) Oral daily  ferrous    sulfate 325milliGRAM(s) Oral daily  enoxaparin Injectable 60milliGRAM(s) SubCutaneous daily  pantoprazole    Tablet 40milliGRAM(s) Oral daily  collagenase Ointment 1Application(s) Topical daily  bisacodyl 5milliGRAM(s) Oral at bedtime  levoFLOXacin IVPB 750milliGRAM(s) IV Intermittent every 24 hours  levoFLOXacin IVPB  IV Intermittent     MEDICATIONS  (PRN):  ondansetron Injectable 4milliGRAM(s) IV Push every 6 hours PRN Nausea  ALBUTerol    0.083% 2.5milliGRAM(s) Nebulizer every 6 hours PRN Shortness of Breath and/or Wheezing  oxybutynin 5milliGRAM(s) Oral two times a day PRN Bladder spasms  diazepam    Tablet 10milliGRAM(s) Oral at bedtime PRN insomnia  oxyCODONE  5 mG/acetaminophen 325 mG 1Tablet(s) Oral every 4 hours PRN Severe Pain (7 - 10)  hydrOXYzine hydrochloride 25milliGRAM(s) Oral every 6 hours PRN Itching  acetaminophen   Tablet. 650milliGRAM(s) Oral every 6 hours PRN Moderate Pain (4 - 6)  diphenhydrAMINE   Capsule 50milliGRAM(s) Oral every 4 hours PRN Rash and/or Itching      Vital Signs Last 24 Hrs  T(C): 36.7, Max: 37.1 (03-08 @ 09:15)  T(F): 98, Max: 98.8 (03-08 @ 16:55)  HR: 64 (64 - 96)  BP: 100/70 (100/70 - 108/75)  BP(mean): --  RR: 16 (16 - 17)  SpO2: 95% (95% - 97%)    PHYSICAL EXAM:  Neuro: NAD, A&Ox3  Resp: No incr WOB  CV: NSR, RRR   Abd: Soft, obese, mildly TTP near. Midline wound vac in place w/ good seal; to be changed today. Has been reinforced. Erythematous skin. Packing in other incisions changed on AM rounds.  : Voids  Extr: WWP, no edema    I&O's Detail  I & Os for 24h ending 08 Mar 2017 07:00  =============================================  IN:    Oral Fluid: 1170 ml    Solution: 150 ml    Total IN: 1320 ml  ---------------------------------------------  OUT:    Voided: 950 ml    VAC (Vacuum Assisted Closure) System: 150 ml    Total OUT: 1100 ml  ---------------------------------------------  Total NET: 220 ml    I & Os for current day (as of 09 Mar 2017 06:54)  =============================================  IN:    Oral Fluid: 450 ml    Solution: 150 ml    Total IN: 600 ml  ---------------------------------------------  OUT:    Voided: 350 ml    VAC (Vacuum Assisted Closure) System: 200 ml    Total OUT: 550 ml  ---------------------------------------------  Total NET: 50 ml      LABS:                        9.0    5.5   )-----------( 195      ( 08 Mar 2017 07:16 )             29.2     08 Mar 2017 07:16    139    |  104    |  23     ----------------------------<  90     3.9     |  27     |  0.81     Ca    9.4        08 Mar 2017 07:16  Phos  3.3       08 Mar 2017 07:16  Mg     2.1       08 Mar 2017 07:16    TPro  6.5    /  Alb  2.4    /  TBili  0.3    /  DBili  x      /  AST  8      /  ALT  12     /  AlkPhos  113    08 Mar 2017 07:16

## 2017-03-09 NOTE — PROGRESS NOTE ADULT - SUBJECTIVE AND OBJECTIVE BOX
57 yo female with no complaints today.  Denies fever, chills, n/v, chest pain, sob, abdominal pain, leg pain, lightheadedness, dizziness.                         9.6    5.6   )-----------( 203      ( 09 Mar 2017 07:04 )             31.4     09 Mar 2017 07:04    141    |  106    |  23     ----------------------------<  80     4.0     |  26     |  0.82     Ca    9.6        09 Mar 2017 07:04  Phos  3.1       09 Mar 2017 07:04  Mg     2.1       09 Mar 2017 07:04    TPro  6.5    /  Alb  2.4    /  TBili  0.3    /  DBili  x      /  AST  8      /  ALT  12     /  AlkPhos  113    08 Mar 2017 07:16    T(C): 37.1, Max: 37.1 (03-08 @ 14:37)  HR: 64 (64 - 96)  BP: 141/100 (100/70 - 141/100)  RR: 17 (16 - 17)  SpO2: 93% (93% - 96%)  Wt(kg): --    gen: A&0x3, NAD  Heart: s1,s2 RRR,   Lung: CTA B/L  Abd: n/d, n/t, soft, incision on right abdomen c/d/I, central wound with wound vacc and some yellow drainage, no surrounding erythema, left incision c/d/i no erythema  LE: no edema, b/l LE, no gerard b/l LE  skin: MMM    56 female with abdominal fistula, right abscess resolving, central incision still with drainage with wound vacc, improving with PT, tolerating PO, normal bm, vss    Plan:  PT  wound vacc change today  oob, scds, lovenox  ppi  nausea control prn  pain control prn  Bariatric full diet

## 2017-03-10 RX ADMIN — ENOXAPARIN SODIUM 60 MILLIGRAM(S): 100 INJECTION SUBCUTANEOUS at 14:34

## 2017-03-10 RX ADMIN — Medication 25 MILLIGRAM(S): at 06:05

## 2017-03-10 RX ADMIN — Medication 50 MILLIGRAM(S): at 18:45

## 2017-03-10 RX ADMIN — ESCITALOPRAM OXALATE 20 MILLIGRAM(S): 10 TABLET, FILM COATED ORAL at 14:34

## 2017-03-10 RX ADMIN — Medication 1 APPLICATION(S): at 14:36

## 2017-03-10 RX ADMIN — Medication 50 MILLIGRAM(S): at 11:23

## 2017-03-10 RX ADMIN — Medication 50 MILLIGRAM(S): at 03:28

## 2017-03-10 RX ADMIN — PANTOPRAZOLE SODIUM 40 MILLIGRAM(S): 20 TABLET, DELAYED RELEASE ORAL at 14:33

## 2017-03-10 RX ADMIN — Medication 400 UNIT(S): at 14:36

## 2017-03-10 RX ADMIN — Medication 1 TABLET(S): at 14:34

## 2017-03-10 RX ADMIN — Medication 10 MILLIGRAM(S): at 18:44

## 2017-03-10 RX ADMIN — Medication 325 MILLIGRAM(S): at 14:34

## 2017-03-10 RX ADMIN — Medication 25 MILLIGRAM(S): at 21:14

## 2017-03-10 RX ADMIN — Medication 50 MILLIGRAM(S): at 22:49

## 2017-03-10 RX ADMIN — PREGABALIN 250 MICROGRAM(S): 225 CAPSULE ORAL at 14:36

## 2017-03-10 RX ADMIN — Medication 25 MILLIGRAM(S): at 14:38

## 2017-03-10 NOTE — PROGRESS NOTE ADULT - SUBJECTIVE AND OBJECTIVE BOX
O/N: BRAYDEN  3/9: Wound vac placed , packing changed x2 , VSS , dispo planning started, switched to PO Levaquin DAILY PROGRESS NOTE:    INTERVAL HPI / OVERNIGHT EVENTS:  O/N: BRAYDEN  3/9: Wound vac placed , packing changed x2 , VSS , dispo planning started, switched to PO Levaquin    SUBJECTIVE:  Pt seen & examined at bedside. Pain controlled. No complaints. No F/C, N/V, CP/SOB.    MEDICATIONS  (STANDING):  losartan 100milliGRAM(s) Oral daily  escitalopram 20milliGRAM(s) Oral daily  ALBUTerol    90 MICROgram(s) HFA Inhaler 1Puff(s) Inhalation every 4 hours  cyanocobalamin 250MICROGram(s) Oral daily  cholecalciferol 400Unit(s) Oral daily  multivitamin 1Tablet(s) Oral daily  ferrous    sulfate 325milliGRAM(s) Oral daily  enoxaparin Injectable 60milliGRAM(s) SubCutaneous daily  pantoprazole    Tablet 40milliGRAM(s) Oral daily  collagenase Ointment 1Application(s) Topical daily  bisacodyl 5milliGRAM(s) Oral at bedtime  levoFLOXacin  Tablet 750milliGRAM(s) Oral every 24 hours    MEDICATIONS  (PRN):  ondansetron Injectable 4milliGRAM(s) IV Push every 6 hours PRN Nausea  ALBUTerol    0.083% 2.5milliGRAM(s) Nebulizer every 6 hours PRN Shortness of Breath and/or Wheezing  oxybutynin 5milliGRAM(s) Oral two times a day PRN Bladder spasms  oxyCODONE  5 mG/acetaminophen 325 mG 1Tablet(s) Oral every 4 hours PRN Severe Pain (7 - 10)  hydrOXYzine hydrochloride 25milliGRAM(s) Oral every 6 hours PRN Itching  acetaminophen   Tablet. 650milliGRAM(s) Oral every 6 hours PRN Moderate Pain (4 - 6)  diphenhydrAMINE   Capsule 50milliGRAM(s) Oral every 4 hours PRN Rash and/or Itching  diazepam    Tablet 10milliGRAM(s) Oral at bedtime PRN insomnia    Vital Signs Last 24 Hrs  T(C): 37.1, Max: 37.1 (03-09 @ 16:30)  T(F): 98.7, Max: 98.7 (03-09 @ 16:30)  HR: 73 (73 - 93)  BP: 113/76 (94/63 - 128/76)  BP(mean): --  RR: 17 (16 - 17)  SpO2: 97% (95% - 98%)    PHYSICAL EXAM:  Neuro: NAD, A&Ox3  Resp: No incr WOB  CV: NSR, RRR   Abd: Soft, NT, ND. Midline vac in place w/ good seal; to be replaced today. Erythematous skin w/ pustules appears to be improving. Incisions x 3 re-packed on AM rounds.   : Voids  Extr: WWP, no edema    I&O's Detail    I & Os for current day (as of 10 Mar 2017 09:27)  =============================================  IN:    Oral Fluid: 1430 ml    Total IN: 1430 ml  ---------------------------------------------  OUT:    Voided: 1250 ml    VAC (Vacuum Assisted Closure) System: 200 ml    Total OUT: 1450 ml  ---------------------------------------------  Total NET: -20 ml      LABS:                        9.6    5.6   )-----------( 203      ( 09 Mar 2017 07:04 )             31.4     09 Mar 2017 07:04    141    |  106    |  23     ----------------------------<  80     4.0     |  26     |  0.82     Ca    9.6        09 Mar 2017 07:04  Phos  3.1       09 Mar 2017 07:04  Mg     2.1       09 Mar 2017 07:04 DAILY PROGRESS NOTE:    INTERVAL HPI / OVERNIGHT EVENTS:  O/N: BRAYDEN  3/9: Wound vac placed , packing changed x2 , VSS , dispo planning started, switched to PO Levaquin    SUBJECTIVE:  Pt seen & examined at bedside. Pain controlled. No complaints. No F/C, N/V, CP/SOB.    MEDICATIONS  (STANDING):  losartan 100milliGRAM(s) Oral daily  escitalopram 20milliGRAM(s) Oral daily  ALBUTerol    90 MICROgram(s) HFA Inhaler 1Puff(s) Inhalation every 4 hours  cyanocobalamin 250MICROGram(s) Oral daily  cholecalciferol 400Unit(s) Oral daily  multivitamin 1Tablet(s) Oral daily  ferrous    sulfate 325milliGRAM(s) Oral daily  enoxaparin Injectable 60milliGRAM(s) SubCutaneous daily  pantoprazole    Tablet 40milliGRAM(s) Oral daily  collagenase Ointment 1Application(s) Topical daily  bisacodyl 5milliGRAM(s) Oral at bedtime  levoFLOXacin  Tablet 750milliGRAM(s) Oral every 24 hours    MEDICATIONS  (PRN):  ondansetron Injectable 4milliGRAM(s) IV Push every 6 hours PRN Nausea  ALBUTerol    0.083% 2.5milliGRAM(s) Nebulizer every 6 hours PRN Shortness of Breath and/or Wheezing  oxybutynin 5milliGRAM(s) Oral two times a day PRN Bladder spasms  oxyCODONE  5 mG/acetaminophen 325 mG 1Tablet(s) Oral every 4 hours PRN Severe Pain (7 - 10)  hydrOXYzine hydrochloride 25milliGRAM(s) Oral every 6 hours PRN Itching  acetaminophen   Tablet. 650milliGRAM(s) Oral every 6 hours PRN Moderate Pain (4 - 6)  diphenhydrAMINE   Capsule 50milliGRAM(s) Oral every 4 hours PRN Rash and/or Itching  diazepam    Tablet 10milliGRAM(s) Oral at bedtime PRN insomnia    Vital Signs Last 24 Hrs  T(C): 37.1, Max: 37.1 (03-09 @ 16:30)  T(F): 98.7, Max: 98.7 (03-09 @ 16:30)  HR: 73 (73 - 93)  BP: 113/76 (94/63 - 128/76)  BP(mean): --  RR: 17 (16 - 17)  SpO2: 97% (95% - 98%)    PHYSICAL EXAM:  Neuro: NAD, A&Ox3  Resp: No incr WOB  CV: NSR, RRR   Abd: Soft, NT, ND. Midline vac in place w/ good seal; to be replaced tomorrow unless needed sooner. Erythematous skin w/ pustules appears to be improving. Incisions x 3 re-packed on AM rounds.   : Voids  Extr: WWP, no edema    I&O's Detail    I & Os for current day (as of 10 Mar 2017 09:27)  =============================================  IN:    Oral Fluid: 1430 ml    Total IN: 1430 ml  ---------------------------------------------  OUT:    Voided: 1250 ml    VAC (Vacuum Assisted Closure) System: 200 ml    Total OUT: 1450 ml  ---------------------------------------------  Total NET: -20 ml      LABS:                        9.6    5.6   )-----------( 203      ( 09 Mar 2017 07:04 )             31.4     09 Mar 2017 07:04    141    |  106    |  23     ----------------------------<  80     4.0     |  26     |  0.82     Ca    9.6        09 Mar 2017 07:04  Phos  3.1       09 Mar 2017 07:04  Mg     2.1       09 Mar 2017 07:04

## 2017-03-10 NOTE — PROGRESS NOTE ADULT - ASSESSMENT
57 yo F s/p above procedure  -Pain/nausea control  -Home meds and vitamins  -Daily Dulcolax suppository   -Levaquin 750mg qD

## 2017-03-11 LAB
ANION GAP SERPL CALC-SCNC: 8 MMOL/L — LOW (ref 9–16)
BUN SERPL-MCNC: 18 MG/DL — SIGNIFICANT CHANGE UP (ref 7–23)
CALCIUM SERPL-MCNC: 9.3 MG/DL — SIGNIFICANT CHANGE UP (ref 8.5–10.5)
CHLORIDE SERPL-SCNC: 107 MMOL/L — SIGNIFICANT CHANGE UP (ref 96–108)
CO2 SERPL-SCNC: 26 MMOL/L — SIGNIFICANT CHANGE UP (ref 22–31)
CREAT SERPL-MCNC: 0.7 MG/DL — SIGNIFICANT CHANGE UP (ref 0.5–1.3)
GLUCOSE SERPL-MCNC: 79 MG/DL — SIGNIFICANT CHANGE UP (ref 70–99)
HCT VFR BLD CALC: 30.6 % — LOW (ref 34.5–45)
HGB BLD-MCNC: 9.4 G/DL — LOW (ref 11.5–15.5)
MAGNESIUM SERPL-MCNC: 2.2 MG/DL — SIGNIFICANT CHANGE UP (ref 1.6–2.4)
MCHC RBC-ENTMCNC: 28 PG — SIGNIFICANT CHANGE UP (ref 27–34)
MCHC RBC-ENTMCNC: 30.7 G/DL — LOW (ref 32–36)
MCV RBC AUTO: 91.1 FL — SIGNIFICANT CHANGE UP (ref 80–100)
PHOSPHATE SERPL-MCNC: 3.1 MG/DL — SIGNIFICANT CHANGE UP (ref 2.5–4.5)
PLATELET # BLD AUTO: 182 K/UL — SIGNIFICANT CHANGE UP (ref 150–400)
POTASSIUM SERPL-MCNC: 4.2 MMOL/L — SIGNIFICANT CHANGE UP (ref 3.5–5.3)
POTASSIUM SERPL-SCNC: 4.2 MMOL/L — SIGNIFICANT CHANGE UP (ref 3.5–5.3)
RBC # BLD: 3.36 M/UL — LOW (ref 3.8–5.2)
RBC # FLD: 16.7 % — SIGNIFICANT CHANGE UP (ref 10.3–16.9)
SODIUM SERPL-SCNC: 141 MMOL/L — SIGNIFICANT CHANGE UP (ref 135–145)
WBC # BLD: 5.5 K/UL — SIGNIFICANT CHANGE UP (ref 3.8–10.5)
WBC # FLD AUTO: 5.5 K/UL — SIGNIFICANT CHANGE UP (ref 3.8–10.5)

## 2017-03-11 RX ADMIN — Medication 1 APPLICATION(S): at 13:44

## 2017-03-11 RX ADMIN — Medication 25 MILLIGRAM(S): at 09:14

## 2017-03-11 RX ADMIN — Medication 50 MILLIGRAM(S): at 07:07

## 2017-03-11 RX ADMIN — Medication 1 TABLET(S): at 12:09

## 2017-03-11 RX ADMIN — PREGABALIN 250 MICROGRAM(S): 225 CAPSULE ORAL at 12:09

## 2017-03-11 RX ADMIN — ESCITALOPRAM OXALATE 20 MILLIGRAM(S): 10 TABLET, FILM COATED ORAL at 13:44

## 2017-03-11 RX ADMIN — Medication 50 MILLIGRAM(S): at 22:30

## 2017-03-11 RX ADMIN — PANTOPRAZOLE SODIUM 40 MILLIGRAM(S): 20 TABLET, DELAYED RELEASE ORAL at 12:09

## 2017-03-11 RX ADMIN — Medication 10 MILLIGRAM(S): at 01:41

## 2017-03-11 RX ADMIN — Medication 325 MILLIGRAM(S): at 12:09

## 2017-03-11 RX ADMIN — ENOXAPARIN SODIUM 60 MILLIGRAM(S): 100 INJECTION SUBCUTANEOUS at 12:09

## 2017-03-11 RX ADMIN — Medication 50 MILLIGRAM(S): at 17:32

## 2017-03-11 RX ADMIN — Medication 50 MILLIGRAM(S): at 12:20

## 2017-03-11 RX ADMIN — Medication 400 UNIT(S): at 12:09

## 2017-03-11 NOTE — PROGRESS NOTE ADULT - SUBJECTIVE AND OBJECTIVE BOX
O/N: BRAYDEN  3/10: Fistulas repacked on AM rounds. O/N: BRAYDEN  3/10: Fistulas repacked on AM rounds    SUBJECTIVE:  Patient seen and examined at bedside with surgery chief resident on AM rounds. Pt w/ no complaints at this time. Tolerating diet. + bowel function. Pending MICHELE.     MEDICATIONS  (STANDING):  scopolamine   Patch 1.5milliGRAM(s) Transdermal once  lactated ringers. 1000milliLiter(s) IV Continuous <Continuous>  amLODIPine   Tablet 10milliGRAM(s) Oral daily  simvastatin 20milliGRAM(s) Oral at bedtime  metoprolol 50milliGRAM(s) Oral daily  pantoprazole  Injectable 40milliGRAM(s) IV Push daily  enoxaparin Injectable 40milliGRAM(s) SubCutaneous every 12 hours    MEDICATIONS  (PRN):  HYDROmorphone  Injectable 0.5milliGRAM(s) IV Push every 4 hours PRN Moderate Pain  HYDROmorphone  Injectable 1milliGRAM(s) IV Push every 4 hours PRN Severe Pain  ondansetron Injectable 4milliGRAM(s) IV Push every 6 hours PRN Nausea  metoclopramide Injectable 10milliGRAM(s) IV Push every 6 hours PRN Nausea and/or Vomiting  HYDROmorphone  Injectable 0.5milliGRAM(s) IV Push every 1 hour PRN breakthrough pain in PACU      Vital Signs Last 24 Hrs  T(C): 37.2, Max: 37.2 (03-11 @ 05:33)  T(F): 98.9, Max: 98.9 (03-11 @ 05:33)  HR: 84 (84 - 998)  BP: 131/73 (121/83 - 133/80)  BP(mean): --  RR: 18 (15 - 18)  SpO2: 97% (93% - 100%)    PHYSICAL EXAM:      Constitutional: A&Ox3    Respiratory: non labored breathing, no respiratory distress    Gastrointestinal: Soft, ND, ATTP                 Incision: Dressings in place, wound vac in place w/ minimal leakage; to be replaced today    Genitourinary: voids    Extremities: (-) edema                  I&O's Detail    I & Os for current day (as of 11 Mar 2017 07:39)  =============================================  IN:    lactated ringers.: 1050 ml    Total IN: 1050 ml  ---------------------------------------------  OUT:    Voided: 1400 ml    Total OUT: 1400 ml  ---------------------------------------------  Total NET: -350 ml      LABS:                        13.1   8.6   )-----------( 176      ( 10 Mar 2017 06:38 )             37.3     10 Mar 2017 06:38    137    |  104    |  15     ----------------------------<  133    3.8     |  23     |  0.68     Ca    8.4        10 Mar 2017 06:38  Phos  3.1       10 Mar 2017 06:38  Mg     2.1       10 Mar 2017 06:38            RADIOLOGY & ADDITIONAL STUDIES:1gen saeid

## 2017-03-11 NOTE — PROGRESS NOTE ADULT - ASSESSMENT
55 yo F s/p above procedure  -Pain/nausea control  -Home meds and vitamins  -Daily Dulcolax suppository   -Levaquin 750mg qD 57 yo F s/p above procedure  -Pain/nausea control  -Home meds and vitamins  -Daily Dulcolax suppository   -Levaquin 750mg qD   -Vac change today with dressing change  -Pending MICHELE discharge

## 2017-03-12 RX ADMIN — Medication 50 MILLIGRAM(S): at 21:10

## 2017-03-12 RX ADMIN — Medication 1 TABLET(S): at 13:32

## 2017-03-12 RX ADMIN — Medication 325 MILLIGRAM(S): at 13:33

## 2017-03-12 RX ADMIN — Medication 10 MILLIGRAM(S): at 18:16

## 2017-03-12 RX ADMIN — Medication 1 APPLICATION(S): at 13:34

## 2017-03-12 RX ADMIN — ESCITALOPRAM OXALATE 20 MILLIGRAM(S): 10 TABLET, FILM COATED ORAL at 13:32

## 2017-03-12 RX ADMIN — Medication 400 UNIT(S): at 13:33

## 2017-03-12 RX ADMIN — PANTOPRAZOLE SODIUM 40 MILLIGRAM(S): 20 TABLET, DELAYED RELEASE ORAL at 13:33

## 2017-03-12 RX ADMIN — Medication 10 MILLIGRAM(S): at 00:57

## 2017-03-12 RX ADMIN — Medication 50 MILLIGRAM(S): at 13:35

## 2017-03-12 RX ADMIN — Medication 50 MILLIGRAM(S): at 06:44

## 2017-03-12 RX ADMIN — PREGABALIN 250 MICROGRAM(S): 225 CAPSULE ORAL at 13:32

## 2017-03-12 RX ADMIN — ENOXAPARIN SODIUM 60 MILLIGRAM(S): 100 INJECTION SUBCUTANEOUS at 14:11

## 2017-03-12 NOTE — PROGRESS NOTE ADULT - SUBJECTIVE AND OBJECTIVE BOX
O/N: Wound vac changed, incisions repacked.   3/11: Wound vac and dressings replaced. BRAYDEN. O/N: Wound vac changed, incisions repacked.   3/11: Wound vac and dressings replaced. BRAYDEN.        SUBJECTIVE: Patient seen and examined bedside by chief resident this AM. No complaints except that she wants canned peaches.    losartan 100milliGRAM(s) Oral daily  enoxaparin Injectable 60milliGRAM(s) SubCutaneous daily  levoFLOXacin  Tablet 750milliGRAM(s) Oral every 24 hours      Vital Signs Last 24 Hrs  T(C): 36.3, Max: 37.3 (03-11 @ 13:30)  T(F): 97.4, Max: 99.1 (03-11 @ 13:30)  HR: 83 (77 - 94)  BP: 120/79 (84/54 - 120/79)  BP(mean): --  RR: 16 (15 - 16)  SpO2: 96% (72% - 96%)  I&O's Detail  I & Os for 24h ending 12 Mar 2017 07:00  =============================================  IN:    Oral Fluid: 840 ml    Total IN: 840 ml  ---------------------------------------------  OUT:    Voided: 1200 ml    VAC (Vacuum Assisted Closure) System: 100 ml    Total OUT: 1300 ml  ---------------------------------------------  Total NET: -460 ml    I & Os for current day (as of 12 Mar 2017 11:53)  =============================================  IN:    Oral Fluid: 480 ml    Total IN: 480 ml  ---------------------------------------------  OUT:    Voided: 300 ml    Total OUT: 300 ml  ---------------------------------------------  Total NET: 180 ml      General: NAD, resting comfortably in bed  Pulm: Nonlabored breathing, no respiratory distress  Abd: soft, NT/ND. Obese. Multiple open wounds draining, packed. Granulating wound to left of midline with draining fistula, wound vac in place.  Extrem: WWP, no edema, significant muscular atrophy        LABS:                        9.4    5.5   )-----------( 182      ( 11 Mar 2017 07:25 )             30.6     11 Mar 2017 07:25    141    |  107    |  18     ----------------------------<  79     4.2     |  26     |  0.70     Ca    9.3        11 Mar 2017 07:25  Phos  3.1       11 Mar 2017 07:25  Mg     2.2       11 Mar 2017 07:25            RADIOLOGY & ADDITIONAL STUDIES:

## 2017-03-12 NOTE — PROGRESS NOTE ADULT - ASSESSMENT
56yoF with PMH HTN, LSG converted to RYGB (2002) complicated by stricture and multiple leaks s/p most recent revision (2016) admitted with abdominal abscesses and fistulae  Pain/Nausea control  Diet bariatric diet  [id number]/SCDs/OOBA/IS  will discuss plan with attending 56yoF with PMH HTN, LSG converted to RYGB (2002) complicated by stricture and multiple leaks s/p most recent revision (2016) admitted with abdominal abscesses and fistulae  Pain/Nausea control  Diet bariatric diet  [id number]/SCDs/OOBA/IS  Wound vac changed again today, all packable wounds packed with iodoform ribbon  will discuss plan with attending

## 2017-03-13 VITALS
OXYGEN SATURATION: 94 % | TEMPERATURE: 100 F | DIASTOLIC BLOOD PRESSURE: 70 MMHG | RESPIRATION RATE: 17 BRPM | SYSTOLIC BLOOD PRESSURE: 107 MMHG | HEART RATE: 94 BPM

## 2017-03-13 RX ORDER — ENOXAPARIN SODIUM 100 MG/ML
1 INJECTION SUBCUTANEOUS
Qty: 30 | Refills: 0 | OUTPATIENT
Start: 2017-03-13

## 2017-03-13 RX ORDER — CIPROFLOXACIN LACTATE 400MG/40ML
1 VIAL (ML) INTRAVENOUS
Qty: 21 | Refills: 0 | OUTPATIENT
Start: 2017-03-13 | End: 2017-04-03

## 2017-03-13 RX ADMIN — Medication 1 APPLICATION(S): at 13:07

## 2017-03-13 RX ADMIN — Medication 400 UNIT(S): at 13:05

## 2017-03-13 RX ADMIN — Medication 325 MILLIGRAM(S): at 13:05

## 2017-03-13 RX ADMIN — ESCITALOPRAM OXALATE 20 MILLIGRAM(S): 10 TABLET, FILM COATED ORAL at 13:05

## 2017-03-13 RX ADMIN — Medication 10 MILLIGRAM(S): at 10:42

## 2017-03-13 RX ADMIN — Medication 1 TABLET(S): at 13:05

## 2017-03-13 RX ADMIN — Medication 50 MILLIGRAM(S): at 18:24

## 2017-03-13 RX ADMIN — Medication 10 MILLIGRAM(S): at 00:19

## 2017-03-13 RX ADMIN — PREGABALIN 250 MICROGRAM(S): 225 CAPSULE ORAL at 13:05

## 2017-03-13 RX ADMIN — ENOXAPARIN SODIUM 60 MILLIGRAM(S): 100 INJECTION SUBCUTANEOUS at 13:19

## 2017-03-13 RX ADMIN — PANTOPRAZOLE SODIUM 40 MILLIGRAM(S): 20 TABLET, DELAYED RELEASE ORAL at 13:05

## 2017-03-13 RX ADMIN — LOSARTAN POTASSIUM 100 MILLIGRAM(S): 100 TABLET, FILM COATED ORAL at 13:05

## 2017-03-13 NOTE — PROGRESS NOTE ADULT - ASSESSMENT
56yoF with PMH HTN, LSG converted to RYGB (2002) complicated by stricture and multiple leaks s/p most recent revision (2016) admitted with abdominal abscesses and fistulae  Pain/Nausea control  Diet bariatric diet  [id number]/SCDs/OOBA/IS  will discuss plan with attending 56yoF with PMH HTN, LSG converted to RYGB (2002) complicated by stricture and multiple leaks s/p most recent revision (2016) admitted with abdominal abscesses and fistulae  Pain/Nausea control  Bariatric diet  [id number]/SCDs/OOBA/IS  Plan for discharge to [facility] today

## 2017-03-13 NOTE — PROGRESS NOTE ADULT - SUBJECTIVE AND OBJECTIVE BOX
O/N: WOund vac changed. Incisions repacked.   3/12: Wound vac changed with white and black foam. All packable wounds packed. O/N: WOund vac changed. Incisions repacked.   3/12: Wound vac changed with white and black foam. All packable wounds packed.     DAILY PROGRESS NOTE:    INTERVAL HPI / OVERNIGHT EVENTS:      SUBJECTIVE:  Flatus: YES  Bowel movement: NO  Pain controlled: YES   Nausea: NO            Vomiting: NO  Diarrhea: NO         Constipation: NO     Chest Pain: NO    SOB: NO    MEDICATIONS  (STANDING):  losartan 100milliGRAM(s) Oral daily  escitalopram 20milliGRAM(s) Oral daily  ALBUTerol    90 MICROgram(s) HFA Inhaler 1Puff(s) Inhalation every 4 hours  cyanocobalamin 250MICROGram(s) Oral daily  cholecalciferol 400Unit(s) Oral daily  multivitamin 1Tablet(s) Oral daily  ferrous    sulfate 325milliGRAM(s) Oral daily  enoxaparin Injectable 60milliGRAM(s) SubCutaneous daily  pantoprazole    Tablet 40milliGRAM(s) Oral daily  collagenase Ointment 1Application(s) Topical daily  levoFLOXacin  Tablet 750milliGRAM(s) Oral every 24 hours    MEDICATIONS  (PRN):  ondansetron Injectable 4milliGRAM(s) IV Push every 6 hours PRN Nausea  ALBUTerol    0.083% 2.5milliGRAM(s) Nebulizer every 6 hours PRN Shortness of Breath and/or Wheezing  oxybutynin 5milliGRAM(s) Oral two times a day PRN Bladder spasms  hydrOXYzine hydrochloride 25milliGRAM(s) Oral every 6 hours PRN Itching  acetaminophen   Tablet. 650milliGRAM(s) Oral every 6 hours PRN Moderate Pain (4 - 6)  diphenhydrAMINE   Capsule 50milliGRAM(s) Oral every 4 hours PRN Rash and/or Itching  diazepam    Tablet 10milliGRAM(s) Oral at bedtime PRN insomnia  bisacodyl Suppository 10milliGRAM(s) Rectal daily PRN Constipation      Vital Signs Last 24 Hrs  T(C): 36.8, Max: 36.8 (03-12 @ 21:13)  T(F): 98.2, Max: 98.2 (03-12 @ 21:13)  HR: 66 (66 - 90)  BP: 106/74 (106/74 - 129/90)  BP(mean): --  RR: 17 (16 - 17)  SpO2: 96% (93% - 98%)    PHYSICAL EXAM:  Neuro: NAD, A&Ox3  Resp: No incr WOB  CV: NSR, RRR   Abd: Soft, NT, ND. Midline wounds: White sponge inserted into inferior fistula tract w/ small piece of white sponge placed over superior fistula tract given increased output; skin edge protected w/ thin strip of vac plastic prior to application of black sponge and vac suction apparatus. Incisions (2 right of midline, 1 left of midline) repacked; all w/ less output and healing skin.   : Voids  Extr: WWP, no edema    I&O's Detail    I & Os for current day (as of 13 Mar 2017 07:46)  =============================================  IN:    Oral Fluid: 960 ml    Total IN: 960 ml  ---------------------------------------------  OUT:    Voided: 900 ml    VAC (Vacuum Assisted Closure) System: 175 ml    Total OUT: 1075 ml  ---------------------------------------------  Total NET: -115 ml      LABS:

## 2017-03-16 DIAGNOSIS — K68.11 POSTPROCEDURAL RETROPERITONEAL ABSCESS: ICD-10-CM

## 2017-03-16 DIAGNOSIS — Z86.718 PERSONAL HISTORY OF OTHER VENOUS THROMBOSIS AND EMBOLISM: ICD-10-CM

## 2017-03-16 DIAGNOSIS — K63.2 FISTULA OF INTESTINE: ICD-10-CM

## 2017-03-16 DIAGNOSIS — L02.211 CUTANEOUS ABSCESS OF ABDOMINAL WALL: ICD-10-CM

## 2017-03-16 DIAGNOSIS — F32.9 MAJOR DEPRESSIVE DISORDER, SINGLE EPISODE, UNSPECIFIED: ICD-10-CM

## 2017-03-16 DIAGNOSIS — B95.5 UNSPECIFIED STREPTOCOCCUS AS THE CAUSE OF DISEASES CLASSIFIED ELSEWHERE: ICD-10-CM

## 2017-03-16 DIAGNOSIS — J18.1 LOBAR PNEUMONIA, UNSPECIFIED ORGANISM: ICD-10-CM

## 2017-03-16 DIAGNOSIS — I95.9 HYPOTENSION, UNSPECIFIED: ICD-10-CM

## 2017-03-31 ENCOUNTER — APPOINTMENT (OUTPATIENT)
Dept: BARIATRICS | Facility: CLINIC | Age: 57
End: 2017-03-31

## 2017-03-31 VITALS
OXYGEN SATURATION: 98 % | BODY MASS INDEX: 25.86 KG/M2 | WEIGHT: 151.5 LBS | HEART RATE: 86 BPM | HEIGHT: 64 IN | SYSTOLIC BLOOD PRESSURE: 130 MMHG | TEMPERATURE: 97.9 F | DIASTOLIC BLOOD PRESSURE: 87 MMHG

## 2017-04-26 ENCOUNTER — APPOINTMENT (OUTPATIENT)
Dept: BARIATRICS | Facility: CLINIC | Age: 57
End: 2017-04-26

## 2017-04-26 VITALS
DIASTOLIC BLOOD PRESSURE: 85 MMHG | HEIGHT: 64 IN | BODY MASS INDEX: 27.14 KG/M2 | HEART RATE: 92 BPM | WEIGHT: 159 LBS | SYSTOLIC BLOOD PRESSURE: 129 MMHG

## 2017-06-09 ENCOUNTER — APPOINTMENT (OUTPATIENT)
Dept: BARIATRICS | Facility: CLINIC | Age: 57
End: 2017-06-09

## 2017-06-09 VITALS
HEIGHT: 64 IN | BODY MASS INDEX: 27.83 KG/M2 | SYSTOLIC BLOOD PRESSURE: 123 MMHG | WEIGHT: 163 LBS | DIASTOLIC BLOOD PRESSURE: 79 MMHG | HEART RATE: 78 BPM

## 2017-06-09 DIAGNOSIS — K63.2 FISTULA OF INTESTINE: ICD-10-CM

## 2017-06-14 RX ORDER — NYSTATIN 100000 1/G
100000 POWDER TOPICAL
Qty: 1 | Refills: 2 | Status: ACTIVE | COMMUNITY
Start: 2017-06-14 | End: 1900-01-01

## 2017-07-07 ENCOUNTER — OTHER (OUTPATIENT)
Age: 57
End: 2017-07-07

## 2017-07-19 ENCOUNTER — APPOINTMENT (OUTPATIENT)
Dept: BARIATRICS | Facility: CLINIC | Age: 57
End: 2017-07-19

## 2017-07-19 ENCOUNTER — OUTPATIENT (OUTPATIENT)
Dept: OUTPATIENT SERVICES | Facility: HOSPITAL | Age: 57
LOS: 1 days | End: 2017-07-19
Payer: COMMERCIAL

## 2017-07-19 VITALS
TEMPERATURE: 98.8 F | WEIGHT: 176.38 LBS | BODY MASS INDEX: 30.11 KG/M2 | HEART RATE: 90 BPM | SYSTOLIC BLOOD PRESSURE: 130 MMHG | DIASTOLIC BLOOD PRESSURE: 85 MMHG | HEIGHT: 64 IN

## 2017-07-19 DIAGNOSIS — B36.9 SUPERFICIAL MYCOSIS, UNSPECIFIED: ICD-10-CM

## 2017-07-19 DIAGNOSIS — K31.6 FISTULA OF STOMACH AND DUODENUM: ICD-10-CM

## 2017-07-19 DIAGNOSIS — Z98.84 BARIATRIC SURGERY STATUS: Chronic | ICD-10-CM

## 2017-07-19 LAB
ALBUMIN SERPL ELPH-MCNC: 3.6 G/DL — SIGNIFICANT CHANGE UP (ref 3.3–5)
ALP SERPL-CCNC: 124 U/L — HIGH (ref 40–120)
ALT FLD-CCNC: 23 U/L — SIGNIFICANT CHANGE UP (ref 10–45)
ANION GAP SERPL CALC-SCNC: 14 MMOL/L — SIGNIFICANT CHANGE UP (ref 5–17)
APPEARANCE UR: CLEAR — SIGNIFICANT CHANGE UP
AST SERPL-CCNC: 20 U/L — SIGNIFICANT CHANGE UP (ref 10–40)
BACTERIA # UR AUTO: PRESENT /HPF
BILIRUB SERPL-MCNC: 0.4 MG/DL — SIGNIFICANT CHANGE UP (ref 0.2–1.2)
BILIRUB UR-MCNC: NEGATIVE — SIGNIFICANT CHANGE UP
BUN SERPL-MCNC: 21 MG/DL — SIGNIFICANT CHANGE UP (ref 7–23)
CALCIUM SERPL-MCNC: 9.5 MG/DL — SIGNIFICANT CHANGE UP (ref 8.4–10.5)
CHLORIDE SERPL-SCNC: 107 MMOL/L — SIGNIFICANT CHANGE UP (ref 96–108)
CO2 SERPL-SCNC: 22 MMOL/L — SIGNIFICANT CHANGE UP (ref 22–31)
COD CRY URNS QL: (no result) /HPF
COLOR SPEC: YELLOW — SIGNIFICANT CHANGE UP
CREAT SERPL-MCNC: 0.7 MG/DL — SIGNIFICANT CHANGE UP (ref 0.5–1.3)
DIFF PNL FLD: NEGATIVE — SIGNIFICANT CHANGE UP
EPI CELLS # UR: (no result) /HPF
GLUCOSE SERPL-MCNC: 72 MG/DL — SIGNIFICANT CHANGE UP (ref 70–99)
GLUCOSE UR QL: NEGATIVE — SIGNIFICANT CHANGE UP
HCT VFR BLD CALC: 43.4 % — SIGNIFICANT CHANGE UP (ref 34.5–45)
HGB BLD-MCNC: 13.3 G/DL — SIGNIFICANT CHANGE UP (ref 11.5–15.5)
KETONES UR-MCNC: NEGATIVE — SIGNIFICANT CHANGE UP
LEUKOCYTE ESTERASE UR-ACNC: NEGATIVE — SIGNIFICANT CHANGE UP
MCHC RBC-ENTMCNC: 27.9 PG — SIGNIFICANT CHANGE UP (ref 27–34)
MCHC RBC-ENTMCNC: 30.6 G/DL — LOW (ref 32–36)
MCV RBC AUTO: 91 FL — SIGNIFICANT CHANGE UP (ref 80–100)
NITRITE UR-MCNC: POSITIVE
PH UR: 5 — SIGNIFICANT CHANGE UP (ref 5–8)
PLATELET # BLD AUTO: 215 K/UL — SIGNIFICANT CHANGE UP (ref 150–400)
POTASSIUM SERPL-MCNC: 4.4 MMOL/L — SIGNIFICANT CHANGE UP (ref 3.5–5.3)
POTASSIUM SERPL-SCNC: 4.4 MMOL/L — SIGNIFICANT CHANGE UP (ref 3.5–5.3)
PROT SERPL-MCNC: 7 G/DL — SIGNIFICANT CHANGE UP (ref 6–8.3)
PROT UR-MCNC: NEGATIVE MG/DL — SIGNIFICANT CHANGE UP
RBC # BLD: 4.77 M/UL — SIGNIFICANT CHANGE UP (ref 3.8–5.2)
RBC # FLD: 16.6 % — SIGNIFICANT CHANGE UP (ref 10.3–16.9)
SODIUM SERPL-SCNC: 143 MMOL/L — SIGNIFICANT CHANGE UP (ref 135–145)
SP GR SPEC: >=1.03 — SIGNIFICANT CHANGE UP (ref 1–1.03)
UROBILINOGEN FLD QL: 0.2 E.U./DL — SIGNIFICANT CHANGE UP
WBC # BLD: 9.3 K/UL — SIGNIFICANT CHANGE UP (ref 3.8–10.5)
WBC # FLD AUTO: 9.3 K/UL — SIGNIFICANT CHANGE UP (ref 3.8–10.5)
WBC UR QL: < 5 /HPF — SIGNIFICANT CHANGE UP

## 2017-07-19 PROCEDURE — 85027 COMPLETE CBC AUTOMATED: CPT

## 2017-07-19 PROCEDURE — 85610 PROTHROMBIN TIME: CPT

## 2017-07-19 PROCEDURE — 85730 THROMBOPLASTIN TIME PARTIAL: CPT

## 2017-07-19 PROCEDURE — 81001 URINALYSIS AUTO W/SCOPE: CPT

## 2017-07-19 PROCEDURE — 80053 COMPREHEN METABOLIC PANEL: CPT

## 2017-07-20 ENCOUNTER — FORM ENCOUNTER (OUTPATIENT)
Age: 57
End: 2017-07-20

## 2017-07-21 ENCOUNTER — OUTPATIENT (OUTPATIENT)
Dept: OUTPATIENT SERVICES | Facility: HOSPITAL | Age: 57
LOS: 1 days | End: 2017-07-21
Payer: COMMERCIAL

## 2017-07-21 VITALS
OXYGEN SATURATION: 99 % | HEIGHT: 63 IN | TEMPERATURE: 97 F | WEIGHT: 169.98 LBS | SYSTOLIC BLOOD PRESSURE: 113 MMHG | DIASTOLIC BLOOD PRESSURE: 77 MMHG | RESPIRATION RATE: 16 BRPM | HEART RATE: 63 BPM

## 2017-07-21 DIAGNOSIS — Z41.9 ENCOUNTER FOR PROCEDURE FOR PURPOSES OTHER THAN REMEDYING HEALTH STATE, UNSPECIFIED: Chronic | ICD-10-CM

## 2017-07-21 DIAGNOSIS — Z98.84 BARIATRIC SURGERY STATUS: Chronic | ICD-10-CM

## 2017-07-21 DIAGNOSIS — K31.6 FISTULA OF STOMACH AND DUODENUM: ICD-10-CM

## 2017-07-21 LAB
APTT BLD: 22.2 SEC — LOW (ref 27.5–37.4)
INR BLD: 0.94 — SIGNIFICANT CHANGE UP (ref 0.88–1.16)
PROTHROM AB SERPL-ACNC: 10.4 SEC — SIGNIFICANT CHANGE UP (ref 9.8–12.7)

## 2017-07-21 PROCEDURE — 85730 THROMBOPLASTIN TIME PARTIAL: CPT

## 2017-07-21 PROCEDURE — 74177 CT ABD & PELVIS W/CONTRAST: CPT | Mod: 26

## 2017-07-21 PROCEDURE — 85610 PROTHROMBIN TIME: CPT

## 2017-07-21 PROCEDURE — 74177 CT ABD & PELVIS W/CONTRAST: CPT

## 2017-07-21 NOTE — ASU PATIENT PROFILE, ADULT - NS PRO AD PATIENT TYPE
Edvin Alatorre [facility]-[phone] or [phone]/Health Care Proxy (HCP)

## 2017-07-21 NOTE — ASU PATIENT PROFILE, ADULT - PSH
Elective surgery  NOV 2016 gastric bypass revision  Elective surgery  abodominal wall surgery  dec 2016  Gastric bypass status for obesity  gastric sleeve, 6/2012  S/P appendectomy  1975  S/P breast biopsy  2011, left  S/P colon resection  2011  S/P knee surgery  repair 2009, 2011  right; left  S/P tonsillectomy  1967  Umbilical hernia  2000

## 2017-07-24 ENCOUNTER — APPOINTMENT (OUTPATIENT)
Dept: THORACIC SURGERY | Facility: HOSPITAL | Age: 57
End: 2017-07-24

## 2017-07-24 ENCOUNTER — APPOINTMENT (OUTPATIENT)
Dept: BARIATRICS | Facility: HOSPITAL | Age: 57
End: 2017-07-24

## 2017-07-24 ENCOUNTER — RESULT REVIEW (OUTPATIENT)
Age: 57
End: 2017-07-24

## 2017-07-24 ENCOUNTER — INPATIENT (INPATIENT)
Facility: HOSPITAL | Age: 57
LOS: 213 days | Discharge: NURSING, REHAB OR LONG-TERM CARE | DRG: 901 | End: 2018-02-23
Attending: SURGERY | Admitting: SURGERY
Payer: COMMERCIAL

## 2017-07-24 DIAGNOSIS — Z41.9 ENCOUNTER FOR PROCEDURE FOR PURPOSES OTHER THAN REMEDYING HEALTH STATE, UNSPECIFIED: Chronic | ICD-10-CM

## 2017-07-24 DIAGNOSIS — Z98.84 BARIATRIC SURGERY STATUS: Chronic | ICD-10-CM

## 2017-07-24 DIAGNOSIS — K22.2 ESOPHAGEAL OBSTRUCTION: ICD-10-CM

## 2017-07-24 LAB
ANION GAP SERPL CALC-SCNC: 11 MMOL/L — SIGNIFICANT CHANGE UP (ref 5–17)
APPEARANCE UR: CLEAR — SIGNIFICANT CHANGE UP
APTT BLD: 26.2 SEC — LOW (ref 27.5–37.4)
APTT BLD: 26.3 SEC — LOW (ref 27.5–37.4)
BASE EXCESS BLDA CALC-SCNC: -7.1 MMOL/L — LOW (ref -2–3)
BASE EXCESS BLDA CALC-SCNC: -9.3 MMOL/L — LOW (ref -2–3)
BILIRUB UR-MCNC: NEGATIVE — SIGNIFICANT CHANGE UP
BUN SERPL-MCNC: 15 MG/DL — SIGNIFICANT CHANGE UP (ref 7–23)
CA-I BLDA-SCNC: 1.05 MMOL/L — LOW (ref 1.12–1.3)
CALCIUM SERPL-MCNC: 8.4 MG/DL — SIGNIFICANT CHANGE UP (ref 8.4–10.5)
CHLORIDE SERPL-SCNC: 112 MMOL/L — HIGH (ref 96–108)
CO2 SERPL-SCNC: 19 MMOL/L — LOW (ref 22–31)
COHGB MFR BLDA: 0.5 % — SIGNIFICANT CHANGE UP
COLOR SPEC: YELLOW — SIGNIFICANT CHANGE UP
CREAT SERPL-MCNC: 0.7 MG/DL — SIGNIFICANT CHANGE UP (ref 0.5–1.3)
DIFF PNL FLD: NEGATIVE — SIGNIFICANT CHANGE UP
GAS PNL BLDA: SIGNIFICANT CHANGE UP
GAS PNL BLDA: SIGNIFICANT CHANGE UP
GLUCOSE SERPL-MCNC: 181 MG/DL — HIGH (ref 70–99)
GLUCOSE UR QL: NEGATIVE — SIGNIFICANT CHANGE UP
HCO3 BLDA-SCNC: 16 MMOL/L — LOW (ref 21–28)
HCO3 BLDA-SCNC: 19 MMOL/L — LOW (ref 21–28)
HCT VFR BLD CALC: 35.6 % — SIGNIFICANT CHANGE UP (ref 34.5–45)
HCT VFR BLD CALC: 38.4 % — SIGNIFICANT CHANGE UP (ref 34.5–45)
HGB BLD-MCNC: 12.2 G/DL — SIGNIFICANT CHANGE UP (ref 11.5–15.5)
HGB BLD-MCNC: 13.4 G/DL — SIGNIFICANT CHANGE UP (ref 11.5–15.5)
HGB BLDA-MCNC: 13.2 G/DL — SIGNIFICANT CHANGE UP (ref 11.5–15.5)
INR BLD: 0.98 — SIGNIFICANT CHANGE UP (ref 0.88–1.16)
INR BLD: 1.41 — HIGH (ref 0.88–1.16)
KETONES UR-MCNC: NEGATIVE — SIGNIFICANT CHANGE UP
LACTATE SERPL-SCNC: 3.7 MMOL/L — HIGH (ref 0.5–2)
LEUKOCYTE ESTERASE UR-ACNC: NEGATIVE — SIGNIFICANT CHANGE UP
MAGNESIUM SERPL-MCNC: 1.2 MG/DL — LOW (ref 1.6–2.6)
MCHC RBC-ENTMCNC: 29.3 PG — SIGNIFICANT CHANGE UP (ref 27–34)
MCHC RBC-ENTMCNC: 29.4 PG — SIGNIFICANT CHANGE UP (ref 27–34)
MCHC RBC-ENTMCNC: 34.3 G/DL — SIGNIFICANT CHANGE UP (ref 32–36)
MCHC RBC-ENTMCNC: 34.9 G/DL — SIGNIFICANT CHANGE UP (ref 32–36)
MCV RBC AUTO: 84.2 FL — SIGNIFICANT CHANGE UP (ref 80–100)
MCV RBC AUTO: 85.6 FL — SIGNIFICANT CHANGE UP (ref 80–100)
METHGB MFR BLDA: 0.1 % — SIGNIFICANT CHANGE UP
NITRITE UR-MCNC: NEGATIVE — SIGNIFICANT CHANGE UP
O2 CT VFR BLDA CALC: 19.5 ML/DL — SIGNIFICANT CHANGE UP (ref 15–23)
OXYHGB MFR BLDA: 99 % — SIGNIFICANT CHANGE UP (ref 94–100)
PCO2 BLDA: 34 MMHG — SIGNIFICANT CHANGE UP (ref 32–45)
PCO2 BLDA: 41 MMHG — SIGNIFICANT CHANGE UP (ref 32–45)
PH BLDA: 7.28 — LOW (ref 7.35–7.45)
PH BLDA: 7.29 — LOW (ref 7.35–7.45)
PH UR: 5 — SIGNIFICANT CHANGE UP (ref 5–8)
PHOSPHATE SERPL-MCNC: 4.1 MG/DL — SIGNIFICANT CHANGE UP (ref 2.5–4.5)
PLATELET # BLD AUTO: 123 K/UL — LOW (ref 150–400)
PLATELET # BLD AUTO: 98 K/UL — LOW (ref 150–400)
PO2 BLDA: 141 MMHG — HIGH (ref 83–108)
PO2 BLDA: 438 MMHG — HIGH (ref 83–108)
POTASSIUM BLDA-SCNC: 4.5 MMOL/L — SIGNIFICANT CHANGE UP (ref 3.5–4.9)
POTASSIUM SERPL-MCNC: 4 MMOL/L — SIGNIFICANT CHANGE UP (ref 3.5–5.3)
POTASSIUM SERPL-SCNC: 4 MMOL/L — SIGNIFICANT CHANGE UP (ref 3.5–5.3)
PROT UR-MCNC: NEGATIVE MG/DL — SIGNIFICANT CHANGE UP
PROTHROM AB SERPL-ACNC: 10.9 SEC — SIGNIFICANT CHANGE UP (ref 9.8–12.7)
PROTHROM AB SERPL-ACNC: 15.8 SEC — HIGH (ref 9.8–12.7)
RBC # BLD: 4.16 M/UL — SIGNIFICANT CHANGE UP (ref 3.8–5.2)
RBC # BLD: 4.56 M/UL — SIGNIFICANT CHANGE UP (ref 3.8–5.2)
RBC # FLD: 14.7 % — SIGNIFICANT CHANGE UP (ref 10.3–16.9)
RBC # FLD: 15.6 % — SIGNIFICANT CHANGE UP (ref 10.3–16.9)
SAO2 % BLDA: 99 % — SIGNIFICANT CHANGE UP (ref 95–100)
SAO2 % BLDA: 99 % — SIGNIFICANT CHANGE UP (ref 95–100)
SODIUM BLDA-SCNC: 135 MMOL/L — LOW (ref 138–146)
SODIUM SERPL-SCNC: 142 MMOL/L — SIGNIFICANT CHANGE UP (ref 135–145)
SP GR SPEC: 1.02 — SIGNIFICANT CHANGE UP (ref 1–1.03)
UROBILINOGEN FLD QL: 0.2 E.U./DL — SIGNIFICANT CHANGE UP
WBC # BLD: 6 K/UL — SIGNIFICANT CHANGE UP (ref 3.8–10.5)
WBC # BLD: 7.5 K/UL — SIGNIFICANT CHANGE UP (ref 3.8–10.5)
WBC # FLD AUTO: 6 K/UL — SIGNIFICANT CHANGE UP (ref 3.8–10.5)
WBC # FLD AUTO: 7.5 K/UL — SIGNIFICANT CHANGE UP (ref 3.8–10.5)

## 2017-07-24 PROCEDURE — 43340 FUSE ESOPHAGUS & INTESTINE: CPT

## 2017-07-24 PROCEDURE — 44130 BOWEL TO BOWEL FUSION: CPT

## 2017-07-24 PROCEDURE — 43200 ESOPHAGOSCOPY FLEXIBLE BRUSH: CPT | Mod: 59

## 2017-07-24 PROCEDURE — 71010: CPT | Mod: 26

## 2017-07-24 PROCEDURE — 47130 PARTIAL REMOVAL OF LIVER: CPT

## 2017-07-24 PROCEDURE — 99222 1ST HOSP IP/OBS MODERATE 55: CPT | Mod: GC

## 2017-07-24 PROCEDURE — 43340 FUSE ESOPHAGUS & INTESTINE: CPT | Mod: 22,62,GC

## 2017-07-24 PROCEDURE — 44640 REPAIR BOWEL-SKIN FISTULA: CPT | Mod: 22

## 2017-07-24 RX ORDER — HYDROMORPHONE HYDROCHLORIDE 2 MG/ML
0.5 INJECTION INTRAMUSCULAR; INTRAVENOUS; SUBCUTANEOUS EVERY 4 HOURS
Qty: 0 | Refills: 0 | Status: DISCONTINUED | OUTPATIENT
Start: 2017-07-24 | End: 2017-07-28

## 2017-07-24 RX ORDER — PIPERACILLIN AND TAZOBACTAM 4; .5 G/20ML; G/20ML
3.38 INJECTION, POWDER, LYOPHILIZED, FOR SOLUTION INTRAVENOUS EVERY 6 HOURS
Qty: 0 | Refills: 0 | Status: DISCONTINUED | OUTPATIENT
Start: 2017-07-24 | End: 2017-07-24

## 2017-07-24 RX ORDER — SODIUM CHLORIDE 9 MG/ML
1000 INJECTION, SOLUTION INTRAVENOUS ONCE
Qty: 0 | Refills: 0 | Status: COMPLETED | OUTPATIENT
Start: 2017-07-24 | End: 2017-07-24

## 2017-07-24 RX ORDER — PIPERACILLIN AND TAZOBACTAM 4; .5 G/20ML; G/20ML
3.38 INJECTION, POWDER, LYOPHILIZED, FOR SOLUTION INTRAVENOUS ONCE
Qty: 0 | Refills: 0 | Status: DISCONTINUED | OUTPATIENT
Start: 2017-07-24 | End: 2017-07-24

## 2017-07-24 RX ORDER — MORPHINE SULFATE 50 MG/1
2 CAPSULE, EXTENDED RELEASE ORAL ONCE
Qty: 0 | Refills: 0 | Status: DISCONTINUED | OUTPATIENT
Start: 2017-07-24 | End: 2017-07-24

## 2017-07-24 RX ORDER — GENTAMICIN SULFATE 40 MG/ML
385 VIAL (ML) INJECTION EVERY 24 HOURS
Qty: 0 | Refills: 0 | Status: COMPLETED | OUTPATIENT
Start: 2017-07-24 | End: 2017-07-24

## 2017-07-24 RX ORDER — HYDROMORPHONE HYDROCHLORIDE 2 MG/ML
1 INJECTION INTRAMUSCULAR; INTRAVENOUS; SUBCUTANEOUS EVERY 4 HOURS
Qty: 0 | Refills: 0 | Status: DISCONTINUED | OUTPATIENT
Start: 2017-07-24 | End: 2017-07-28

## 2017-07-24 RX ORDER — SODIUM CHLORIDE 9 MG/ML
1000 INJECTION, SOLUTION INTRAVENOUS ONCE
Qty: 0 | Refills: 0 | Status: DISCONTINUED | OUTPATIENT
Start: 2017-07-24 | End: 2017-07-25

## 2017-07-24 RX ORDER — HYDROMORPHONE HYDROCHLORIDE 2 MG/ML
0.5 INJECTION INTRAMUSCULAR; INTRAVENOUS; SUBCUTANEOUS EVERY 4 HOURS
Qty: 0 | Refills: 0 | Status: DISCONTINUED | OUTPATIENT
Start: 2017-07-24 | End: 2017-07-24

## 2017-07-24 RX ORDER — HYDROMORPHONE HYDROCHLORIDE 2 MG/ML
1 INJECTION INTRAMUSCULAR; INTRAVENOUS; SUBCUTANEOUS EVERY 4 HOURS
Qty: 0 | Refills: 0 | Status: DISCONTINUED | OUTPATIENT
Start: 2017-07-24 | End: 2017-07-24

## 2017-07-24 RX ORDER — PHYTONADIONE (VIT K1) 5 MG
10 TABLET ORAL ONCE
Qty: 0 | Refills: 0 | Status: COMPLETED | OUTPATIENT
Start: 2017-07-24 | End: 2017-07-24

## 2017-07-24 RX ORDER — FENTANYL CITRATE 50 UG/ML
1 INJECTION INTRAVENOUS
Qty: 2500 | Refills: 0 | Status: DISCONTINUED | OUTPATIENT
Start: 2017-07-24 | End: 2017-07-24

## 2017-07-24 RX ORDER — VANCOMYCIN HCL 1 G
1250 VIAL (EA) INTRAVENOUS EVERY 12 HOURS
Qty: 0 | Refills: 0 | Status: DISCONTINUED | OUTPATIENT
Start: 2017-07-24 | End: 2017-07-25

## 2017-07-24 RX ORDER — ONDANSETRON 8 MG/1
4 TABLET, FILM COATED ORAL EVERY 6 HOURS
Qty: 0 | Refills: 0 | Status: DISCONTINUED | OUTPATIENT
Start: 2017-07-24 | End: 2017-09-18

## 2017-07-24 RX ORDER — SODIUM CHLORIDE 9 MG/ML
1000 INJECTION, SOLUTION INTRAVENOUS
Qty: 0 | Refills: 0 | Status: DISCONTINUED | OUTPATIENT
Start: 2017-07-24 | End: 2017-07-25

## 2017-07-24 RX ORDER — PROPOFOL 10 MG/ML
1 INJECTION, EMULSION INTRAVENOUS
Qty: 1000 | Refills: 0 | Status: DISCONTINUED | OUTPATIENT
Start: 2017-07-24 | End: 2017-07-24

## 2017-07-24 RX ORDER — HYDROMORPHONE HYDROCHLORIDE 2 MG/ML
0.5 INJECTION INTRAMUSCULAR; INTRAVENOUS; SUBCUTANEOUS
Qty: 0 | Refills: 0 | Status: DISCONTINUED | OUTPATIENT
Start: 2017-07-24 | End: 2017-07-24

## 2017-07-24 RX ADMIN — HYDROMORPHONE HYDROCHLORIDE 0.5 MILLIGRAM(S): 2 INJECTION INTRAMUSCULAR; INTRAVENOUS; SUBCUTANEOUS at 19:29

## 2017-07-24 RX ADMIN — Medication 102 MILLIGRAM(S): at 15:21

## 2017-07-24 RX ADMIN — Medication 200 MILLIGRAM(S): at 20:23

## 2017-07-24 RX ADMIN — HYDROMORPHONE HYDROCHLORIDE 0.5 MILLIGRAM(S): 2 INJECTION INTRAMUSCULAR; INTRAVENOUS; SUBCUTANEOUS at 16:54

## 2017-07-24 RX ADMIN — MORPHINE SULFATE 2 MILLIGRAM(S): 50 CAPSULE, EXTENDED RELEASE ORAL at 14:38

## 2017-07-24 RX ADMIN — Medication 166.67 MILLIGRAM(S): at 21:35

## 2017-07-24 RX ADMIN — HYDROMORPHONE HYDROCHLORIDE 0.5 MILLIGRAM(S): 2 INJECTION INTRAMUSCULAR; INTRAVENOUS; SUBCUTANEOUS at 17:19

## 2017-07-24 RX ADMIN — SODIUM CHLORIDE 60.06 MILLILITER(S): 9 INJECTION, SOLUTION INTRAVENOUS at 23:30

## 2017-07-24 RX ADMIN — HYDROMORPHONE HYDROCHLORIDE 1 MILLIGRAM(S): 2 INJECTION INTRAMUSCULAR; INTRAVENOUS; SUBCUTANEOUS at 21:01

## 2017-07-24 RX ADMIN — ONDANSETRON 4 MILLIGRAM(S): 8 TABLET, FILM COATED ORAL at 15:17

## 2017-07-24 RX ADMIN — HYDROMORPHONE HYDROCHLORIDE 0.5 MILLIGRAM(S): 2 INJECTION INTRAMUSCULAR; INTRAVENOUS; SUBCUTANEOUS at 22:50

## 2017-07-24 RX ADMIN — SODIUM CHLORIDE 250 MILLILITER(S): 9 INJECTION, SOLUTION INTRAVENOUS at 15:21

## 2017-07-24 RX ADMIN — HYDROMORPHONE HYDROCHLORIDE 0.5 MILLIGRAM(S): 2 INJECTION INTRAMUSCULAR; INTRAVENOUS; SUBCUTANEOUS at 16:00

## 2017-07-24 RX ADMIN — HYDROMORPHONE HYDROCHLORIDE 0.5 MILLIGRAM(S): 2 INJECTION INTRAMUSCULAR; INTRAVENOUS; SUBCUTANEOUS at 18:59

## 2017-07-24 RX ADMIN — HYDROMORPHONE HYDROCHLORIDE 0.5 MILLIGRAM(S): 2 INJECTION INTRAMUSCULAR; INTRAVENOUS; SUBCUTANEOUS at 17:45

## 2017-07-24 RX ADMIN — HYDROMORPHONE HYDROCHLORIDE 1 MILLIGRAM(S): 2 INJECTION INTRAMUSCULAR; INTRAVENOUS; SUBCUTANEOUS at 20:28

## 2017-07-24 RX ADMIN — HYDROMORPHONE HYDROCHLORIDE 0.5 MILLIGRAM(S): 2 INJECTION INTRAMUSCULAR; INTRAVENOUS; SUBCUTANEOUS at 22:52

## 2017-07-24 NOTE — CONSULT NOTE ADULT - ATTENDING COMMENTS
I was called by Dr. Brady for an intraoperative consultation. The patient was unexpectedly found to have a distal esophageal stricture just above the GEJ. I helped perform an esophagojejunostomy with a 25 EEA. The anastomosis was tested under water with EGD insufflation with no leak.     - Will continue to follow as inpatient.

## 2017-07-24 NOTE — CONSULT NOTE ADULT - ASSESSMENT
89F w/PMHx of HTN, gastric bypass in 2002 c/b stricture, corkscrewed sleeve and chronic leak, recently revised on 11/28/16 with protracted (70-day) postoperative course complicated by MDR infections presents for enterocutaneous fistula resection, now under SICU care and currently intubated.  Neuro: Propofol, fent gtt  CV: Currently not requiring any pressors, maintain MAPs of 65  Resp: Intubated A/C (40/350/12/5)  GI/FEN: NPO/LR@250  ID: Perioperative Gent/Vanc  : Aimee  Heme/PPX: SCDs for now, holding SQH  Lines: [place] (7/24--), PIVs  Drains: Right mediastinal CHECO in Right abdomen  Wounds: Midline xiphoid to pubis with prevena vac in place 57F w/PMHx of HTN, gastric bypass in 2002 c/b stricture, corkscrewed sleeve and chronic leak, recently revised on 11/28/16 with protracted (70-day) postoperative course complicated by MDR infections presents for enterocutaneous fistula resection, now under SICU care and currently intubated.  Neuro: Propofol, fent gtt  CV: Currently not requiring any pressors, maintain MAPs of 65  Resp: Intubated A/C (40/350/12/5)  GI/FEN: NPO/LR@250  ID: Perioperative Gent/Vanc  : Aimee  Heme/PPX: SCDs for now, holding SQH  Lines: Radha (7/24--), PIVs  Drains: Right mediastinal CHECO in Right abdomen  Wounds: Midline xiphoid to pubis with prevena vac in place 57F w/PMHx of HTN, gastric bypass in 2002 c/b stricture, corkscrewed sleeve and chronic leak, recently revised on 11/28/16 with protracted (70-day) postoperative course complicated by MDR infections presents for enterocutaneous fistula resection, now under SICU care and currently intubated.  Neuro: Propofol, fent gtt  CV: Currently not requiring any pressors, maintain MAPs of 65. Perfusion appears OK  Resp: Intubated A/C (40/350/12/5). ex[ect CPAP trial as awakens for possible extubation.  GI/FEN: NPO/LR@250  ID: Perioperative Gent/Vanc  : Aimee  Heme/PPX: SCDs for now, holding SQH  Lines: Radha (7/24--), PIVs  Drains: Right mediastinal CHECO in Right abdomen  Wounds: Midline xiphoid to pubis with prevena vac in place

## 2017-07-24 NOTE — BRIEF OPERATIVE NOTE - OPERATION/FINDINGS
Dense adhesions throughout abdomen  SBR resection, fistula resection  revision of esophagojejunostomy  drain through esophageal hiatus in R mediastinum

## 2017-07-24 NOTE — H&P ADULT - NSHPPHYSICALEXAM_GEN_ALL_CORE
Gen: AOx3    CV: RRR    Pulm: CTABL    Abdomen: obese, midline fistula with drainage, NTND    Ext: WWP

## 2017-07-24 NOTE — CONSULT NOTE ADULT - ASSESSMENT
89F w/PMHx of HTN, gastric bypass in 2002 c/b stricture, corkscrewed sleeve and chronic leak, recently revised on 11/28/16 with protracted (70-day) postoperative course complicated by MDR infections presents for enterocutaneous fistula resection, went to the OR for resection.  Discovered esophageal stricture intra-op and called Dr. Baird urgently to assist in the case.

## 2017-07-24 NOTE — CONSULT NOTE ADULT - SUBJECTIVE AND OBJECTIVE BOX
HPI:  56F w/PMHx of HTN, gastric bypass in 2002 c/b stricture, corkscrewed sleeve and chronic leak, revised on 11/28/16 with protracted (70-day) postoperative course complicated by MDR infections, a C-diff infection, respiratory compromise due to plugging/PNA/effusions requiring multiple interventions and a trach, as well as a continued leak requiring a draining double-pigtail with stenting performed by GI. 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 Denies CP, SOB, N/V, diarrhea. (24 Jul 2017 14:15)    SICU addendum: Pt presented to [facility] for an elective fistulectomy, subsequently underwent an exploratory laparotomy with extensive dense adhesions throughout abdomen, SBR resection, fistula resection, revision of esophagojejunostomy, and drain placement through esophageal hiatus in R mediastinum.  During the case she was reported to have 2L EBL, and received 5u pRBC, 5L Crystalloid, 500cc 5%Albumin and produced 350cc UOP.  She was transported to the PACU intubated and moderate sedation, however wakeful when spoken to.      PAST MEDICAL & SURGICAL HISTORY:  Reflux  Obesity  DVT (deep venous thrombosis): 2013 neck  Diverticulitis  Hypertension  Elective surgery: abodominal wall surgery  dec 2016  Elective surgery: NOV 2016 gastric bypass revision  Gastric bypass status for obesity: gastric sleeve, 6/2012  S/P breast biopsy: 2011, left  S/P colon resection: 2011  S/P knee surgery: repair 2009, 2011  right; left  Umbilical hernia: 2000  S/P appendectomy: 1975  S/P tonsillectomy: 1967      ROS: See HPI    MEDICATIONS:  ALLERGIES:    SOCIAL HISTORY:  Smoke: Never Smoker  EtOH: occasional    Vital Signs Last 24 Hrs  T(C): 36.7 (24 Jul 2017 14:05), Max: 36.7 (24 Jul 2017 14:05)  T(F): 98.1 (24 Jul 2017 14:05), Max: 98.1 (24 Jul 2017 14:05)  HR: 84 (24 Jul 2017 14:05) (84 - 84)  BP: 88/73 (24 Jul 2017 14:05) (88/73 - 88/73)  BP(mean): --  RR: 16 (24 Jul 2017 14:05) (16 - 16)  SpO2: 100% (24 Jul 2017 14:05) (100% - 100%)    PHYSICAL EXAM    Gen: On mechanical ventilation, moderately sedated, in NAD.  Neuro: Opens eyes and gestures to questioning, follows simple commands.  CV: S1/S2 audible, nontachycardic  Pulm: CTAB with good air movement in b/l lung bases, no w/r/r appreciated.  Abd: Soft, NT/ND, prevena wound vac in place and functioning over midline ex lap wound, right abdominal CHECO drain (right mediastinal drain) with sanguinous output.  : Yu draining clear yellow urine to gravity  Ext: WWP with mild peripheral edema        LABS:      Post op labs pending    PT/INR - ( 24 Jul 2017 07:24 )   PT: 10.9 sec;   INR: 0.98          PTT - ( 24 Jul 2017 07:24 )  PTT:26.3 sec HPI:  57F w/PMHx of HTN, gastric bypass in 2002 c/b stricture, corkscrewed sleeve and chronic leak, revised on 11/28/16 with protracted (70-day) postoperative course complicated by MDR infections, a C-diff infection, respiratory compromise due to plugging/PNA/effusions requiring multiple interventions and a trach, as well as a continued leak requiring a draining double-pigtail with stenting performed by GI. 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 Denies CP, SOB, N/V, diarrhea. (24 Jul 2017 14:15)    SICU addendum: Pt presented to [facility] for an elective fistulectomy, subsequently underwent an exploratory laparotomy with extensive dense adhesions throughout abdomen, SBR resection, fistula resection, revision of esophagojejunostomy, and drain placement through esophageal hiatus in R mediastinum.  During the case she was reported to have 2L EBL, and received 5u pRBC, 5L Crystalloid, 500cc 5%Albumin and produced 350cc UOP.  She was transported to the PACU intubated and moderate sedation, however wakeful when spoken to.      PAST MEDICAL & SURGICAL HISTORY:  Reflux  Obesity  DVT (deep venous thrombosis): 2013 neck  Diverticulitis  Hypertension  Elective surgery: abodominal wall surgery  dec 2016  Elective surgery: NOV 2016 gastric bypass revision  Gastric bypass status for obesity: gastric sleeve, 6/2012  S/P breast biopsy: 2011, left  S/P colon resection: 2011  S/P knee surgery: repair 2009, 2011  right; left  Umbilical hernia: 2000  S/P appendectomy: 1975  S/P tonsillectomy: 1967      ROS: See HPI    MEDICATIONS:  ALLERGIES:    SOCIAL HISTORY:  Smoke: Never Smoker  EtOH: occasional    Vital Signs Last 24 Hrs  T(C): 36.7 (24 Jul 2017 14:05), Max: 36.7 (24 Jul 2017 14:05)  T(F): 98.1 (24 Jul 2017 14:05), Max: 98.1 (24 Jul 2017 14:05)  HR: 84 (24 Jul 2017 14:05) (84 - 84)  BP: 88/73 (24 Jul 2017 14:05) (88/73 - 88/73)  BP(mean): --  RR: 16 (24 Jul 2017 14:05) (16 - 16)  SpO2: 100% (24 Jul 2017 14:05) (100% - 100%)    PHYSICAL EXAM    Gen: On mechanical ventilation, moderately sedated, in NAD.  Neuro: Opens eyes and gestures to questioning, follows simple commands.  CV: S1/S2 audible, nontachycardic  Pulm: CTAB with good air movement in b/l lung bases, no w/r/r appreciated.  Abd: Soft, NT/ND, prevena wound vac in place and functioning over midline ex lap wound, right abdominal CHECO drain (right mediastinal drain) with sanguinous output.  : Yu draining clear yellow urine to gravity  Ext: WWP with mild peripheral edema        LABS:      Post op labs pending    PT/INR - ( 24 Jul 2017 07:24 )   PT: 10.9 sec;   INR: 0.98          PTT - ( 24 Jul 2017 07:24 )  PTT:26.3 sec HPI:  57F w/PMHx of HTN, gastric bypass in 2002 c/b stricture, corkscrewed sleeve and chronic leak, revised on 11/28/16 with protracted (70-day) postoperative course complicated by MDR infections, a C-diff infection, respiratory compromise due to plugging/PNA/effusions requiring multiple interventions and a trach, as well as a continued leak requiring a draining double-pigtail with stenting performed by GI. 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 Denies CP, SOB, N/V, diarrhea. (24 Jul 2017 14:15)    SICU addendum: Pt presented to [facility] for an elective fistulectomy, subsequently underwent an exploratory laparotomy with extensive dense adhesions throughout abdomen, SBR resection, fistula resection, revision of esophagojejunostomy, and drain placement through esophageal hiatus in R mediastinum.  During the case she was reported to have 2L EBL, and received 5u pRBC, 5L Crystalloid, 500cc 5%Albumin and produced 350cc UOP.  She was transported to the PACU intubated and moderate sedation, however wakeful when spoken to.      PAST MEDICAL & SURGICAL HISTORY:  Reflux  Obesity  DVT (deep venous thrombosis): 2013 neck  Diverticulitis  Hypertension  Elective surgery: abodominal wall surgery  dec 2016  Elective surgery: NOV 2016 gastric bypass revision  Gastric bypass status for obesity: gastric sleeve, 6/2012  S/P breast biopsy: 2011, left  S/P colon resection: 2011  S/P knee surgery: repair 2009, 2011  right; left  Umbilical hernia: 2000  S/P appendectomy: 1975  S/P tonsillectomy: 1967      ROS: See HPI    MEDICATIONS:  ALLERGIES:    SOCIAL HISTORY:  Smoke: Never Smoker  EtOH: occasional    Vital Signs Last 24 Hrs  T(C): 36.7 (24 Jul 2017 14:05), Max: 36.7 (24 Jul 2017 14:05)  T(F): 98.1 (24 Jul 2017 14:05), Max: 98.1 (24 Jul 2017 14:05)  HR: 84 (24 Jul 2017 14:05) (84 - 84)  BP: 88/73 (24 Jul 2017 14:05) (88/73 - 88/73)  BP(mean): --  RR: 16 (24 Jul 2017 14:05) (16 - 16)  SpO2: 100% (24 Jul 2017 14:05) (100% - 100%)    PHYSICAL EXAM    Gen: On mechanical ventilation, moderately sedated, in NAD.  HEENT: PERRL, EOMI, MMD  Neuro: Opens eyes and gestures to questioning, follows simple commands, moves all extremities.  CV: S1/S2 audible, nontachycardic  Pulm: CTAB with good air movement in b/l lung bases, no w/r/r appreciated.  Abd: Soft, NT/ND, prevena wound vac in place and functioning over midline ex lap wound, right abdominal CHECO drain (right mediastinal drain) with sanguinous output.  : Yu draining clear yellow urine to gravity  Ext: WWP with mild peripheral edema  Vasc: 2+ radial and DP pulses  MSK: no joint swelling  Skin: no rash        LABS:      Post op labs pending    PT/INR - ( 24 Jul 2017 07:24 )   PT: 10.9 sec;   INR: 0.98          PTT - ( 24 Jul 2017 07:24 )  PTT:26.3 sec

## 2017-07-24 NOTE — H&P ADULT - HISTORY OF PRESENT ILLNESS
56F w/PMHx of HTN, gastric bypass in 2002 c/b stricture, corkscrewed sleeve and chronic leak, revised on 11/28/16 with protracted (70-day) postoperative course complicated by MDR infections, a C-diff infection, respiratory compromise due to plugging/PNA/effusions requiring multiple interventions and a trach, as well as a continued leak requiring a draining double-pigtail with stenting performed by GI. 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 Denies CP, SOB, N/V, diarrhea.

## 2017-07-24 NOTE — CONSULT NOTE ADULT - SUBJECTIVE AND OBJECTIVE BOX
Surgeon: Ken Varela    Requesting Physician: Miguel Trent    HISTORY OF PRESENT ILLNESS:  From H&P:  56F w/PMHx of HTN, gastric bypass in 2002 c/b stricture, corkscrewed sleeve and chronic leak, revised on 11/28/16 with protracted (70-day) postoperative course complicated by MDR infections, a C-diff infection, respiratory compromise due to plugging/PNA/effusions requiring multiple interventions and a trach, as well as a continued leak requiring a draining double-pigtail with stenting performed by GI. 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 Denies CP, SOB, N/V, diarrhea.    While in the OR for repair of the fistula, an esophageal stricture was discovered, and Dr. Patton called urgently to the OR to assist in the case.      She arrived to the PACU intubated.  She was just extubated when I saw her in the PACU, and was alert and responsive after the tube came out, but c/o pain (pointing to her abdomen).      PAST MEDICAL & SURGICAL HISTORY:  Reflux  Obesity  DVT (deep venous thrombosis): 2013 neck  Diverticulitis  Hypertension  Elective surgery: abodominal wall surgery  dec 2016  Elective surgery: NOV 2016 gastric bypass revision  Gastric bypass status for obesity: gastric sleeve, 6/2012  S/P breast biopsy: 2011, left  S/P colon resection: 2011  S/P knee surgery: repair 2009, 2011  right; left  Umbilical hernia: 2000  S/P appendectomy: 1975  S/P tonsillectomy: 1967      MEDICATIONS  (STANDING):  lactated ringers. 1000 milliLiter(s) (250 mL/Hr) IV Continuous <Continuous>  vancomycin  IVPB 1250 milliGRAM(s) IV Intermittent every 12 hours  fentaNYL   Infusion 1 MICROgram(s)/kG/Hr (7.566 mL/Hr) IV Continuous <Continuous>  propofol Infusion 1 MICROgram(s)/kG/Min (0.454 mL/Hr) IV Continuous <Continuous>    MEDICATIONS  (PRN):  ondansetron Injectable 4 milliGRAM(s) IV Push every 6 hours PRN Nausea      Allergies    sulfa drugs (Unknown)  sulfamethoxazole (Other)    Intolerances        SOCIAL HISTORY: (will illicit tomorrow)  Smoker:  YES / NO        PACK YEARS:                         WHEN QUIT?  ETOH use:  YES / NO               FREQUENCY / QUANTITY:  Ilicit Drug use:  YES / NO  Occupation:  Assisted device use (Cane / Walker):  Live with:    FAMILY HISTORY:  No pertinent family history in first degree relatives      Review of Systems  CONSTITUTIONAL:  Fevers / chills, sweats, fatigue, weight loss, weight gain                                    NEGATIVE  NEURO:  parathesias, seizures, syncope, confusion                                                                               NEGATIVE  EYES:  Blurry vision, discharge, pain, loss of vision                                                                                  NEGATIVE  ENMT:  Difficulty hearing, vertigo, dysphagia, epistaxis, recent dental work                                     NEGATIVE  CV:  Chest pain, palpitations, GARCIA, orthopnea                                                                                         NEGATIVE  RESPIRATORY:  Wheezing, SOB, cough / sputum, hemoptysis                                                              NEGATIVE  GI:  Nausea, vommiting, diarrhea, constipation, melena                                                                        NEGATIVE  : Hematuria, dysuria, urgency, incontinence                                                                                       NEGATIVE  MUSKULOSKELETAL:  arthritis, joint swelling, muscle weakness                                                           NEGATIVE  SKIN/BREAST:  rash, itching, cheikh loss, masses                                                                                            NEGATIVE  PSYCH:  depresion, anxiety, suicidal ideation                                                                                             NEGATIVE  HEME/LYMPH:  bruises easily, enlarged lymph nodes, tender lymph nodes                                        NEGATIVE  ENDOCRINE:  cold intolerance, heat intolerance, polydipsia                                                                   NEGATIVE    PHYSICAL EXAM  Vital Signs Last 24 Hrs  T(C): 36.7 (24 Jul 2017 14:05), Max: 36.7 (24 Jul 2017 14:05)  T(F): 98.1 (24 Jul 2017 14:05), Max: 98.1 (24 Jul 2017 14:05)  HR: 82 (24 Jul 2017 14:10) (82 - 84)  BP: 106/67 (24 Jul 2017 14:10) (88/73 - 106/67)  BP(mean): --  RR: 14 (24 Jul 2017 14:10) (14 - 16)  SpO2: 100% (24 Jul 2017 14:10) (100% - 100%)    CONSTITUTIONAL:                                                                   Just extubated in the PACU.  Looks groggy, and in pain but responsive and alert.    NEURO:                                                                                             No notable focal deficits  EYES:                                                                                                  WNL  ENMT:                                                                                                WNL  CV:                                                                                                      No murmur heard.  RESPIRATORY:                                                                                  Some scattered rhonchi.  Only listened anteriorly since she was just extubated.   GI:                                                                                                       Soft, non-tender  : LI + / -                                                                              Yes  MUSKULOSKELETAL:                                                                       WNL  SKIN / BREAST:                                                                                 WNL                                                          LABS:                        12.2   6.0   )-----------( 98       ( 24 Jul 2017 15:01 )             35.6     07-24    142  |  112<H>  |  15  ----------------------------<  181<H>  4.0   |  19<L>  |  0.70    Ca    8.4      24 Jul 2017 15:01  Phos  4.1     07-24  Mg     1.2     07-24      PT/INR - ( 24 Jul 2017 15:01 )   PT: 15.8 sec;   INR: 1.41          PTT - ( 24 Jul 2017 15:01 )  PTT:26.2 sec Surgeon: Ken Varela    Requesting Physician: Miguel Trent    HISTORY OF PRESENT ILLNESS:  From H&P:  56F w/PMHx of HTN, gastric bypass in 2002 c/b stricture, corkscrewed sleeve and chronic leak, revised on 11/28/16 with protracted (70-day) postoperative course complicated by MDR infections, a C-diff infection, respiratory compromise due to plugging/PNA/effusions requiring multiple interventions and a trach, as well as a continued leak requiring a draining double-pigtail with stenting performed by GI. 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 Denies CP, SOB, N/V, diarrhea.    While in the OR for repair of the fistula, an esophageal stricture was discovered, and Dr. Patton called urgently to the OR to assist in the case.      She arrived to the PACU intubated.  She was just extubated when I saw her in the PACU, and was alert and responsive after the tube came out, but c/o pain (pointing to her abdomen).      PAST MEDICAL & SURGICAL HISTORY:  Reflux  Obesity  DVT (deep venous thrombosis): 2013 neck  Diverticulitis  Hypertension  Elective surgery: abodominal wall surgery  dec 2016  Elective surgery: NOV 2016 gastric bypass revision  Gastric bypass status for obesity: gastric sleeve, 6/2012  S/P breast biopsy: 2011, left  S/P colon resection: 2011  S/P knee surgery: repair 2009, 2011  right; left  Umbilical hernia: 2000  S/P appendectomy: 1975  S/P tonsillectomy: 1967      MEDICATIONS  (STANDING):  lactated ringers. 1000 milliLiter(s) (250 mL/Hr) IV Continuous <Continuous>  vancomycin  IVPB 1250 milliGRAM(s) IV Intermittent every 12 hours  fentaNYL   Infusion 1 MICROgram(s)/kG/Hr (7.566 mL/Hr) IV Continuous <Continuous>  propofol Infusion 1 MICROgram(s)/kG/Min (0.454 mL/Hr) IV Continuous <Continuous>    MEDICATIONS  (PRN):  ondansetron Injectable 4 milliGRAM(s) IV Push every 6 hours PRN Nausea      Allergies    sulfa drugs (Unknown)  sulfamethoxazole (Other)    Intolerances        SOCIAL HISTORY: (will illicit tomorrow)  Smoker:  Denies  Denies drug or ETOH abuse.        FAMILY HISTORY:  No pertinent family history in first degree relatives      Review of Systems  CONSTITUTIONAL:  +Pain.  Denies Fevers / chills, sweats, fatigue, weight loss, weight gain                                    POSITIVE  NEURO:  parathesias, seizures, syncope, confusion                                                                               NEGATIVE  EYES:  Blurry vision, discharge, pain, loss of vision                                                                                  NEGATIVE  ENMT:  Difficulty hearing, vertigo, dysphagia, epistaxis, recent dental work                                     NEGATIVE  CV:  Chest pain, palpitations, GARCIA, orthopnea                                                                                         NEGATIVE  RESPIRATORY:  Wheezing, SOB, cough / sputum, hemoptysis                                                              NEGATIVE  GI:  Nausea, vommiting, diarrhea, constipation, melena                                                                        NEGATIVE  : Hematuria, dysuria, urgency, incontinence                                                                                       NEGATIVE  MUSKULOSKELETAL:  arthritis, joint swelling, muscle weakness                                                           NEGATIVE  SKIN/BREAST:  rash, itching, cheikh loss, masses                                                                                            NEGATIVE  PSYCH:  depresion, anxiety, suicidal ideation                                                                                             NEGATIVE  HEME/LYMPH:  bruises easily, enlarged lymph nodes, tender lymph nodes                                        NEGATIVE  ENDOCRINE:  cold intolerance, heat intolerance, polydipsia                                                                   NEGATIVE    PHYSICAL EXAM  Vital Signs Last 24 Hrs  T(C): 36.7 (24 Jul 2017 14:05), Max: 36.7 (24 Jul 2017 14:05)  T(F): 98.1 (24 Jul 2017 14:05), Max: 98.1 (24 Jul 2017 14:05)  HR: 82 (24 Jul 2017 14:10) (82 - 84)  BP: 106/67 (24 Jul 2017 14:10) (88/73 - 106/67)  BP(mean): --  RR: 14 (24 Jul 2017 14:10) (14 - 16)  SpO2: 100% (24 Jul 2017 14:10) (100% - 100%)    CONSTITUTIONAL:                                                                   Just extubated in the PACU.  Looks groggy, and in pain but responsive and alert.    NEURO:                                                                                             No notable focal deficits  EYES:                                                                                                  WNL  ENMT:                                                                                                WNL  CV:                                                                                                      No murmur heard.  RESPIRATORY:                                                                                  Some scattered rhonchi.  Only listened anteriorly since she was just extubated.   GI:                                                                                                       Soft, non-tender  : LI + / -                                                                              Yes  MUSKULOSKELETAL:                                                                       WNL  SKIN / BREAST:                                                                                 WNL                                                          LABS:                        12.2   6.0   )-----------( 98       ( 24 Jul 2017 15:01 )             35.6     07-24    142  |  112<H>  |  15  ----------------------------<  181<H>  4.0   |  19<L>  |  0.70    Ca    8.4      24 Jul 2017 15:01  Phos  4.1     07-24  Mg     1.2     07-24      PT/INR - ( 24 Jul 2017 15:01 )   PT: 15.8 sec;   INR: 1.41          PTT - ( 24 Jul 2017 15:01 )  PTT:26.2 sec

## 2017-07-24 NOTE — H&P ADULT - ASSESSMENT
89F w/PMHx of HTN, gastric bypass in 2002 c/b stricture, corkscrewed sleeve and chronic leak, recently revised on 11/28/16 with protracted (70-day) postoperative course complicated by MDR infections presents for enterocutaneous fistula resection  -to OR for EC fistula resection  -NPO  -pain/nausea control  -vanco/zosyn

## 2017-07-25 LAB
ANION GAP SERPL CALC-SCNC: 12 MMOL/L — SIGNIFICANT CHANGE UP (ref 5–17)
ANION GAP SERPL CALC-SCNC: 13 MMOL/L — SIGNIFICANT CHANGE UP (ref 5–17)
APPEARANCE UR: CLEAR — SIGNIFICANT CHANGE UP
APTT BLD: 28.3 SEC — SIGNIFICANT CHANGE UP (ref 27.5–37.4)
BASE EXCESS BLDA CALC-SCNC: -7.6 MMOL/L — LOW (ref -2–3)
BILIRUB UR-MCNC: NEGATIVE — SIGNIFICANT CHANGE UP
BUN SERPL-MCNC: 18 MG/DL — SIGNIFICANT CHANGE UP (ref 7–23)
BUN SERPL-MCNC: 21 MG/DL — SIGNIFICANT CHANGE UP (ref 7–23)
CALCIUM SERPL-MCNC: 7.7 MG/DL — LOW (ref 8.4–10.5)
CALCIUM SERPL-MCNC: 8.1 MG/DL — LOW (ref 8.4–10.5)
CHLORIDE SERPL-SCNC: 111 MMOL/L — HIGH (ref 96–108)
CHLORIDE SERPL-SCNC: 111 MMOL/L — HIGH (ref 96–108)
CO2 SERPL-SCNC: 16 MMOL/L — LOW (ref 22–31)
CO2 SERPL-SCNC: 17 MMOL/L — LOW (ref 22–31)
COLOR SPEC: YELLOW — SIGNIFICANT CHANGE UP
CREAT SERPL-MCNC: 1 MG/DL — SIGNIFICANT CHANGE UP (ref 0.5–1.3)
CREAT SERPL-MCNC: 1.3 MG/DL — SIGNIFICANT CHANGE UP (ref 0.5–1.3)
DIFF PNL FLD: (no result)
EXTRA GREEN TOP TUBE: SIGNIFICANT CHANGE UP
GAS PNL BLDA: SIGNIFICANT CHANGE UP
GLUCOSE SERPL-MCNC: 113 MG/DL — HIGH (ref 70–99)
GLUCOSE SERPL-MCNC: 130 MG/DL — HIGH (ref 70–99)
GLUCOSE UR QL: 100
HCO3 BLDA-SCNC: 18 MMOL/L — LOW (ref 21–28)
HCT VFR BLD CALC: 31.8 % — LOW (ref 34.5–45)
HCT VFR BLD CALC: 32.5 % — LOW (ref 34.5–45)
HCT VFR BLD CALC: 33.1 % — LOW (ref 34.5–45)
HGB BLD-MCNC: 10.9 G/DL — LOW (ref 11.5–15.5)
HGB BLD-MCNC: 11.1 G/DL — LOW (ref 11.5–15.5)
HGB BLD-MCNC: 11.3 G/DL — LOW (ref 11.5–15.5)
INR BLD: 1.27 — HIGH (ref 0.88–1.16)
KETONES UR-MCNC: NEGATIVE — SIGNIFICANT CHANGE UP
LACTATE SERPL-SCNC: 2.5 MMOL/L — HIGH (ref 0.5–2)
LACTATE SERPL-SCNC: 2.7 MMOL/L — HIGH (ref 0.5–2)
LACTATE SERPL-SCNC: 3 MMOL/L — HIGH (ref 0.5–2)
LACTATE SERPL-SCNC: 3.9 MMOL/L — HIGH (ref 0.5–2)
LEUKOCYTE ESTERASE UR-ACNC: NEGATIVE — SIGNIFICANT CHANGE UP
MAGNESIUM SERPL-MCNC: 1.2 MG/DL — LOW (ref 1.6–2.6)
MCHC RBC-ENTMCNC: 28.5 PG — SIGNIFICANT CHANGE UP (ref 27–34)
MCHC RBC-ENTMCNC: 28.9 PG — SIGNIFICANT CHANGE UP (ref 27–34)
MCHC RBC-ENTMCNC: 29.7 PG — SIGNIFICANT CHANGE UP (ref 27–34)
MCHC RBC-ENTMCNC: 33.5 G/DL — SIGNIFICANT CHANGE UP (ref 32–36)
MCHC RBC-ENTMCNC: 34.1 G/DL — SIGNIFICANT CHANGE UP (ref 32–36)
MCHC RBC-ENTMCNC: 34.9 G/DL — SIGNIFICANT CHANGE UP (ref 32–36)
MCV RBC AUTO: 84.7 FL — SIGNIFICANT CHANGE UP (ref 80–100)
MCV RBC AUTO: 85 FL — SIGNIFICANT CHANGE UP (ref 80–100)
MCV RBC AUTO: 85.1 FL — SIGNIFICANT CHANGE UP (ref 80–100)
NITRITE UR-MCNC: NEGATIVE — SIGNIFICANT CHANGE UP
PCO2 BLDA: 36 MMHG — SIGNIFICANT CHANGE UP (ref 32–45)
PH BLDA: 7.31 — LOW (ref 7.35–7.45)
PH UR: 5 — SIGNIFICANT CHANGE UP (ref 5–8)
PHOSPHATE SERPL-MCNC: 4.3 MG/DL — SIGNIFICANT CHANGE UP (ref 2.5–4.5)
PLATELET # BLD AUTO: 104 K/UL — LOW (ref 150–400)
PLATELET # BLD AUTO: 113 K/UL — LOW (ref 150–400)
PLATELET # BLD AUTO: 129 K/UL — LOW (ref 150–400)
PO2 BLDA: 89 MMHG — SIGNIFICANT CHANGE UP (ref 83–108)
POTASSIUM SERPL-MCNC: 4.1 MMOL/L — SIGNIFICANT CHANGE UP (ref 3.5–5.3)
POTASSIUM SERPL-MCNC: 4.5 MMOL/L — SIGNIFICANT CHANGE UP (ref 3.5–5.3)
POTASSIUM SERPL-SCNC: 4.1 MMOL/L — SIGNIFICANT CHANGE UP (ref 3.5–5.3)
POTASSIUM SERPL-SCNC: 4.5 MMOL/L — SIGNIFICANT CHANGE UP (ref 3.5–5.3)
PROT UR-MCNC: 30 MG/DL
PROTHROM AB SERPL-ACNC: 14.2 SEC — HIGH (ref 9.8–12.7)
RBC # BLD: 3.74 M/UL — LOW (ref 3.8–5.2)
RBC # BLD: 3.82 M/UL — SIGNIFICANT CHANGE UP (ref 3.8–5.2)
RBC # BLD: 3.91 M/UL — SIGNIFICANT CHANGE UP (ref 3.8–5.2)
RBC # FLD: 16.2 % — SIGNIFICANT CHANGE UP (ref 10.3–16.9)
RBC # FLD: 16.5 % — SIGNIFICANT CHANGE UP (ref 10.3–16.9)
RBC # FLD: 16.7 % — SIGNIFICANT CHANGE UP (ref 10.3–16.9)
SAO2 % BLDA: 96 % — SIGNIFICANT CHANGE UP (ref 95–100)
SODIUM SERPL-SCNC: 140 MMOL/L — SIGNIFICANT CHANGE UP (ref 135–145)
SODIUM SERPL-SCNC: 140 MMOL/L — SIGNIFICANT CHANGE UP (ref 135–145)
SP GR SPEC: 1.02 — SIGNIFICANT CHANGE UP (ref 1–1.03)
UROBILINOGEN FLD QL: 0.2 E.U./DL — SIGNIFICANT CHANGE UP
WBC # BLD: 11 K/UL — HIGH (ref 3.8–10.5)
WBC # BLD: 11.7 K/UL — HIGH (ref 3.8–10.5)
WBC # BLD: 9.2 K/UL — SIGNIFICANT CHANGE UP (ref 3.8–10.5)
WBC # FLD AUTO: 11 K/UL — HIGH (ref 3.8–10.5)
WBC # FLD AUTO: 11.7 K/UL — HIGH (ref 3.8–10.5)
WBC # FLD AUTO: 9.2 K/UL — SIGNIFICANT CHANGE UP (ref 3.8–10.5)

## 2017-07-25 PROCEDURE — 71010: CPT | Mod: 26

## 2017-07-25 PROCEDURE — 99233 SBSQ HOSP IP/OBS HIGH 50: CPT | Mod: GC

## 2017-07-25 PROCEDURE — 36569 INSJ PICC 5 YR+ W/O IMAGING: CPT

## 2017-07-25 RX ORDER — DEXTROSE 50 % IN WATER 50 %
1 SYRINGE (ML) INTRAVENOUS ONCE
Qty: 0 | Refills: 0 | Status: DISCONTINUED | OUTPATIENT
Start: 2017-07-25 | End: 2017-07-25

## 2017-07-25 RX ORDER — INSULIN LISPRO 100/ML
VIAL (ML) SUBCUTANEOUS EVERY 6 HOURS
Qty: 0 | Refills: 0 | Status: DISCONTINUED | OUTPATIENT
Start: 2017-07-25 | End: 2018-01-23

## 2017-07-25 RX ORDER — ACETAMINOPHEN 500 MG
650 TABLET ORAL ONCE
Qty: 0 | Refills: 0 | Status: COMPLETED | OUTPATIENT
Start: 2017-07-25 | End: 2017-07-25

## 2017-07-25 RX ORDER — DEXTROSE 50 % IN WATER 50 %
25 SYRINGE (ML) INTRAVENOUS ONCE
Qty: 0 | Refills: 0 | Status: DISCONTINUED | OUTPATIENT
Start: 2017-07-25 | End: 2018-01-23

## 2017-07-25 RX ORDER — SODIUM CHLORIDE 9 MG/ML
1000 INJECTION INTRAMUSCULAR; INTRAVENOUS; SUBCUTANEOUS ONCE
Qty: 0 | Refills: 0 | Status: COMPLETED | OUTPATIENT
Start: 2017-07-25 | End: 2017-07-25

## 2017-07-25 RX ORDER — DEXTROSE 50 % IN WATER 50 %
25 SYRINGE (ML) INTRAVENOUS ONCE
Qty: 0 | Refills: 0 | Status: DISCONTINUED | OUTPATIENT
Start: 2017-07-25 | End: 2017-07-25

## 2017-07-25 RX ORDER — I.V. FAT EMULSION 20 G/100ML
50 EMULSION INTRAVENOUS
Qty: 0 | Refills: 0 | Status: DISCONTINUED | OUTPATIENT
Start: 2017-07-25 | End: 2017-07-25

## 2017-07-25 RX ORDER — SODIUM CHLORIDE 9 MG/ML
1000 INJECTION, SOLUTION INTRAVENOUS ONCE
Qty: 0 | Refills: 0 | Status: COMPLETED | OUTPATIENT
Start: 2017-07-25 | End: 2017-07-25

## 2017-07-25 RX ORDER — ACETAMINOPHEN 500 MG
1000 TABLET ORAL ONCE
Qty: 0 | Refills: 0 | Status: COMPLETED | OUTPATIENT
Start: 2017-07-25 | End: 2017-07-25

## 2017-07-25 RX ORDER — SODIUM CHLORIDE 9 MG/ML
1000 INJECTION, SOLUTION INTRAVENOUS
Qty: 0 | Refills: 0 | Status: DISCONTINUED | OUTPATIENT
Start: 2017-07-25 | End: 2017-07-26

## 2017-07-25 RX ORDER — ELECTROLYTE SOLUTION,INJ
1 VIAL (ML) INTRAVENOUS
Qty: 0 | Refills: 0 | Status: DISCONTINUED | OUTPATIENT
Start: 2017-07-25 | End: 2017-07-25

## 2017-07-25 RX ORDER — DEXTROSE 50 % IN WATER 50 %
12.5 SYRINGE (ML) INTRAVENOUS ONCE
Qty: 0 | Refills: 0 | Status: DISCONTINUED | OUTPATIENT
Start: 2017-07-25 | End: 2017-07-25

## 2017-07-25 RX ORDER — SODIUM CHLORIDE 9 MG/ML
10 INJECTION INTRAMUSCULAR; INTRAVENOUS; SUBCUTANEOUS
Qty: 0 | Refills: 0 | Status: DISCONTINUED | OUTPATIENT
Start: 2017-07-25 | End: 2018-01-30

## 2017-07-25 RX ORDER — SODIUM CHLORIDE 9 MG/ML
500 INJECTION, SOLUTION INTRAVENOUS ONCE
Qty: 0 | Refills: 0 | Status: DISCONTINUED | OUTPATIENT
Start: 2017-07-25 | End: 2017-07-25

## 2017-07-25 RX ORDER — SODIUM CHLORIDE 9 MG/ML
1000 INJECTION INTRAMUSCULAR; INTRAVENOUS; SUBCUTANEOUS
Qty: 0 | Refills: 0 | Status: DISCONTINUED | OUTPATIENT
Start: 2017-07-25 | End: 2017-07-25

## 2017-07-25 RX ORDER — LINEZOLID 600 MG/300ML
600 INJECTION, SOLUTION INTRAVENOUS EVERY 12 HOURS
Qty: 0 | Refills: 0 | Status: DISCONTINUED | OUTPATIENT
Start: 2017-07-25 | End: 2017-07-26

## 2017-07-25 RX ORDER — SODIUM CHLORIDE 9 MG/ML
1000 INJECTION, SOLUTION INTRAVENOUS
Qty: 0 | Refills: 0 | Status: DISCONTINUED | OUTPATIENT
Start: 2017-07-25 | End: 2017-07-25

## 2017-07-25 RX ORDER — GLUCAGON INJECTION, SOLUTION 0.5 MG/.1ML
1 INJECTION, SOLUTION SUBCUTANEOUS ONCE
Qty: 0 | Refills: 0 | Status: DISCONTINUED | OUTPATIENT
Start: 2017-07-25 | End: 2017-07-25

## 2017-07-25 RX ORDER — SODIUM CHLORIDE 9 MG/ML
20 INJECTION INTRAMUSCULAR; INTRAVENOUS; SUBCUTANEOUS ONCE
Qty: 0 | Refills: 0 | Status: DISCONTINUED | OUTPATIENT
Start: 2017-07-25 | End: 2018-01-30

## 2017-07-25 RX ORDER — SODIUM CHLORIDE 9 MG/ML
1000 INJECTION, SOLUTION INTRAVENOUS
Qty: 0 | Refills: 0 | Status: DISCONTINUED | OUTPATIENT
Start: 2017-07-25 | End: 2018-01-30

## 2017-07-25 RX ORDER — SODIUM CHLORIDE 9 MG/ML
10 INJECTION INTRAMUSCULAR; INTRAVENOUS; SUBCUTANEOUS EVERY 12 HOURS
Qty: 0 | Refills: 0 | Status: DISCONTINUED | OUTPATIENT
Start: 2017-07-25 | End: 2018-01-30

## 2017-07-25 RX ORDER — MAGNESIUM SULFATE 500 MG/ML
1 VIAL (ML) INJECTION ONCE
Qty: 0 | Refills: 0 | Status: DISCONTINUED | OUTPATIENT
Start: 2017-07-25 | End: 2017-07-25

## 2017-07-25 RX ORDER — MAGNESIUM SULFATE 500 MG/ML
2 VIAL (ML) INJECTION ONCE
Qty: 0 | Refills: 0 | Status: COMPLETED | OUTPATIENT
Start: 2017-07-25 | End: 2017-07-25

## 2017-07-25 RX ORDER — LINEZOLID 600 MG/300ML
600 INJECTION, SOLUTION INTRAVENOUS EVERY 12 HOURS
Qty: 0 | Refills: 0 | Status: DISCONTINUED | OUTPATIENT
Start: 2017-07-26 | End: 2017-07-28

## 2017-07-25 RX ORDER — LINEZOLID 600 MG/300ML
600 INJECTION, SOLUTION INTRAVENOUS EVERY 12 HOURS
Qty: 0 | Refills: 0 | Status: DISCONTINUED | OUTPATIENT
Start: 2017-07-25 | End: 2017-07-25

## 2017-07-25 RX ADMIN — HYDROMORPHONE HYDROCHLORIDE 0.5 MILLIGRAM(S): 2 INJECTION INTRAMUSCULAR; INTRAVENOUS; SUBCUTANEOUS at 15:00

## 2017-07-25 RX ADMIN — SODIUM CHLORIDE 60.06 MILLILITER(S): 9 INJECTION INTRAMUSCULAR; INTRAVENOUS; SUBCUTANEOUS at 00:00

## 2017-07-25 RX ADMIN — HYDROMORPHONE HYDROCHLORIDE 1 MILLIGRAM(S): 2 INJECTION INTRAMUSCULAR; INTRAVENOUS; SUBCUTANEOUS at 12:49

## 2017-07-25 RX ADMIN — HYDROMORPHONE HYDROCHLORIDE 1 MILLIGRAM(S): 2 INJECTION INTRAMUSCULAR; INTRAVENOUS; SUBCUTANEOUS at 13:30

## 2017-07-25 RX ADMIN — SODIUM CHLORIDE 175 MILLILITER(S): 9 INJECTION, SOLUTION INTRAVENOUS at 09:49

## 2017-07-25 RX ADMIN — HYDROMORPHONE HYDROCHLORIDE 0.5 MILLIGRAM(S): 2 INJECTION INTRAMUSCULAR; INTRAVENOUS; SUBCUTANEOUS at 20:38

## 2017-07-25 RX ADMIN — HYDROMORPHONE HYDROCHLORIDE 0.5 MILLIGRAM(S): 2 INJECTION INTRAMUSCULAR; INTRAVENOUS; SUBCUTANEOUS at 10:42

## 2017-07-25 RX ADMIN — HYDROMORPHONE HYDROCHLORIDE 0.5 MILLIGRAM(S): 2 INJECTION INTRAMUSCULAR; INTRAVENOUS; SUBCUTANEOUS at 22:14

## 2017-07-25 RX ADMIN — Medication 400 MILLIGRAM(S): at 04:21

## 2017-07-25 RX ADMIN — LINEZOLID 300 MILLIGRAM(S): 600 INJECTION, SOLUTION INTRAVENOUS at 22:05

## 2017-07-25 RX ADMIN — SODIUM CHLORIDE 1000 MILLILITER(S): 9 INJECTION, SOLUTION INTRAVENOUS at 21:30

## 2017-07-25 RX ADMIN — Medication 50 GRAM(S): at 09:27

## 2017-07-25 RX ADMIN — Medication 50 EACH: at 18:00

## 2017-07-25 RX ADMIN — HYDROMORPHONE HYDROCHLORIDE 0.5 MILLIGRAM(S): 2 INJECTION INTRAMUSCULAR; INTRAVENOUS; SUBCUTANEOUS at 06:10

## 2017-07-25 RX ADMIN — Medication 650 MILLIGRAM(S): at 21:30

## 2017-07-25 RX ADMIN — Medication 166.67 MILLIGRAM(S): at 10:04

## 2017-07-25 RX ADMIN — SODIUM CHLORIDE 60.06 MILLILITER(S): 9 INJECTION INTRAMUSCULAR; INTRAVENOUS; SUBCUTANEOUS at 03:00

## 2017-07-25 RX ADMIN — SODIUM CHLORIDE 1000 MILLILITER(S): 9 INJECTION, SOLUTION INTRAVENOUS at 17:10

## 2017-07-25 RX ADMIN — HYDROMORPHONE HYDROCHLORIDE 0.5 MILLIGRAM(S): 2 INJECTION INTRAMUSCULAR; INTRAVENOUS; SUBCUTANEOUS at 06:00

## 2017-07-25 RX ADMIN — I.V. FAT EMULSION 20.83 GRAM(S): 20 EMULSION INTRAVENOUS at 22:05

## 2017-07-25 RX ADMIN — SODIUM CHLORIDE 250 MILLILITER(S): 9 INJECTION INTRAMUSCULAR; INTRAVENOUS; SUBCUTANEOUS at 00:15

## 2017-07-25 RX ADMIN — HYDROMORPHONE HYDROCHLORIDE 0.5 MILLIGRAM(S): 2 INJECTION INTRAMUSCULAR; INTRAVENOUS; SUBCUTANEOUS at 14:12

## 2017-07-25 RX ADMIN — Medication 1000 MILLIGRAM(S): at 04:23

## 2017-07-25 RX ADMIN — HYDROMORPHONE HYDROCHLORIDE 0.5 MILLIGRAM(S): 2 INJECTION INTRAMUSCULAR; INTRAVENOUS; SUBCUTANEOUS at 11:20

## 2017-07-25 RX ADMIN — SODIUM CHLORIDE 1000 MILLILITER(S): 9 INJECTION, SOLUTION INTRAVENOUS at 08:00

## 2017-07-25 NOTE — CONSULT NOTE ADULT - SUBJECTIVE AND OBJECTIVE BOX
Vascular Access Service Consult Note    57yFemaleHEALTH ISSUES - PROBLEM Dx:  Stricture of esophagus: Stricture of esophagus             Diagnosis: esophageal stricture, TPN    Indications for Vascular Access (Check all that apply)  [  ]  Antibiotic Therapy       Antibiotic Prescribed:                                                                             Expected Duration of Therapy:               [  ]  IV Hydration  [  X]  Total Parenteral Nutrition  [  ]  Chemotherapy  [  ]  Difficult Venous Access  [  ]  CVP monitoring  [  ]  Medications with high potential for tissue necrosis on extravasation  [  ]  Other    Screening (Check all that apply)  Previous Radiation to chest  [  ] Yes      [X  ]  No  Breast Cancer                          [  ] Left     [  ]  Right    [  X]  No  Lymph Node Dissection         [  ] Left     [  ]  Right    [X  ]  No  Pacemaker or ICD                   [  ] Left     [  ]  Right    [X  ]  No  Upper Extremity DVT             [  ] Left     [  ]  Right    [ X ]  No  Chronic Kidney Disease         [  ]  Yes     [ X ]  No  Hemodialysis                           [  ]  Yes     [ X ]  No  AV Fistula/ Graft                     [  ]  Left    [  ]  Right    [ X ]  No  Temp>101F in past 24 H       [  ]  Yes     [  X]  No  H/O PICC/Midline                   [  X]  Yes     [  ]  No    Lab data:                        11.1   11.7  )-----------( 104      ( 25 Jul 2017 09:32 )             31.8     07-25    140  |  111<H>  |  18  ----------------------------<  113<H>  4.1   |  17<L>  |  1.00    Ca    7.7<L>      25 Jul 2017 05:34  Phos  4.3     07-25  Mg     1.2     07-25      PT/INR - ( 25 Jul 2017 05:34 )   PT: 14.2 sec;   INR: 1.27          PTT - ( 25 Jul 2017 05:34 )  PTT:28.3 sec          I have reviewed the chart, interviewed and examined the patient and determined that this patient:  [X  ] Is a candidate for a PICC line  [  ] Is a candidate for a Midline  [  ] Is not a candidate for vascular access device (reason)    Lumens:    [  ] Single  [ X ] Double

## 2017-07-25 NOTE — CHART NOTE - NSCHARTNOTEFT_GEN_A_CORE
Patient seen this am and examined.  She remains extubated, able to converse with me.  Arterial line and peripheral IV's in place.      She complains of general pain in her entire abdominal area; States she just received dilaudid which is "taking the edge off".  She remains NPO, using a sponge to wet her lips.  BP was intermittently low overnight with some low urine output, receiving IV fluid boluses per primary team.  Lungs sound grossly clear.  She has a large vertical mid-abdominal incision with prevena wound vac in place.  Also, one CHECO drain in place- 490cc total drainage since OR per chart documentation.  Minimal drainage over past few hours.      Primary team managing medical issues, physical therapy, etc.      Please keep patient NPO until further notified by Dr. Baird or one of his associates.  Will potentially get a barium esophagram this Friday.  We will follow with you.

## 2017-07-25 NOTE — PROGRESS NOTE ADULT - ASSESSMENT
57 F s/p  SBR resection, fistula resection, esophagojejunostomy revision admitted to [facility] for hemodynamic monitoring    Neuro: dilaudid prn   CV: Currently not requiring any pressors, maintain MAPs of 65  Resp: Satting well on NC  GI/FEN:NJT NPO/D5LR with MVI @175  ID: Perioperative Gent/Vanc  : Aimee  Heme/PPX: SCDs for now, holding SQH  Lines: Radha (7/24--), PIVs  Drains: Right mediastinal CHECO in Right abdomen  Wounds: Midline xiphoid to pubis with prevena vac in place 57 F s/p  SBR resection, fistula resection, esophagojejunostomy revision admitted to [facility] for hemodynamic monitoring with intermittent hypotension and likely ATN.    Neuro: dilaudid prn   CV: Currently not requiring any pressors, maintain MAPs of 65 but BP on low side with cool extremities and marginal UO. No evidence of fluid overload so continue IVF and judicious bolusses of isotonic fluid. Follow slight elevation of lactate.  Resp: Satting well on NC  GI/FEN:NJT NPO/D5LR with MVI @175  ID: Perioperative Gent/Vanc. Per Dr. Brady continue vanco for now by levels.  : Yu; creatinine has increased so to recheck UA for casts.  Heme/PPX: SCDs for now, holding SQH  Lines: Radha (7/24--), PIVs. PICC line placed today.  Drains: Right mediastinal CHECO in Right abdomen  Wounds: Midline xiphoid to pubis with prevena vac in place

## 2017-07-25 NOTE — PROGRESS NOTE ADULT - SUBJECTIVE AND OBJECTIVE BOX
s/p complex resection of enterocutaneous stricture and redo esophago jejunostomy with resection of chronic fistula tract  extubated and looks good  vss urine output is slightly low bun and cr are ok  hct is stable  drain is clearing nicely  lactate is going down to 2.4  ng minimal  plan is to keep npo ng in place  get oob   give vitamin bag  get picc for tpn as will keep npo for 5 to 7 days  did not place j tube because of previous history issues  please ambulate and get pt involved

## 2017-07-25 NOTE — PROGRESS NOTE ADULT - SUBJECTIVE AND OBJECTIVE BOX
INTERVAL HPI/OVERNIGHT EVENTS:      O/N: Intra-op, EBL 2L; given 5L LR, 500cc Alb, 5U PRBCs. Post-op labs: Hgb stable 13.4 from 12.2 pre-op; lactate 3.7. Became hypotensive SBP 60s-70s w/ decr UOP 10cc/hr; bolused 3L (1LR, 2NS) w/ good response.     Pt seen and examined at bedside. endorses incisional pain.   PRESSORS: [ ] YES [X ] NO  WHICH:  DOSE:    ANTIBIOTICS:   Vancomycin               DATE STARTED:      MEDICATIONS  (STANDING):  vancomycin  IVPB 1250 milliGRAM(s) IV Intermittent every 12 hours  dextrose 5% + lactated ringers 1000 milliLiter(s) (175 mL/Hr) IV Continuous <Continuous>  fat emulsion 20% Infusion 50 Gram(s) (20.83 mL/Hr) IV Continuous <Continuous>  insulin lispro (HumaLOG) corrective regimen sliding scale   SubCutaneous Before meals and at bedtime  dextrose 5%. 1000 milliLiter(s) (50 mL/Hr) IV Continuous <Continuous>  dextrose 50% Injectable 25 Gram(s) IV Push once  Parenteral Nutrition - Adult 1 Each (50 mL/Hr) TPN Continuous <Continuous>  sodium chloride 0.9% lock flush 20 milliLiter(s) IV Push once    MEDICATIONS  (PRN):  ondansetron Injectable 4 milliGRAM(s) IV Push every 6 hours PRN Nausea  HYDROmorphone  Injectable 0.5 milliGRAM(s) IV Push every 4 hours PRN Moderate Pain (4 - 6)  HYDROmorphone  Injectable 1 milliGRAM(s) IV Push every 4 hours PRN Severe Pain (7 - 10)  sodium chloride 0.9% lock flush 10 milliLiter(s) IV Push every 1 hour PRN After each medication administration  sodium chloride 0.9% lock flush 10 milliLiter(s) IV Push every 12 hours PRN Lumen of catheter NOT used      Drug Dosing Weight  Height (cm): 160.02 (2017 06:32)  Weight (kg): 75.446266833 (2017 07:10)  BMI (kg/m2): 29.5 (2017 07:10)  BSA (m2): 1.79 (2017 07:10)    CENTRAL LINE: [ ] YES [ ] NO  LOCATION:   DATE INSERTED:  REMOVE: [ ] YES [ ] NO  EXPLAIN:    LI: [ ] YES [ ] NO    DATE INSERTED:  REMOVE: [ ] YES [ ] NO  EXPLAIN:    A-LINE: [ ] YES [ ] NO  LOCATION:   DATE INSERTED:  REMOVE: [ ] YES [ ] NO  EXPLAIN:    PAST MEDICAL & SURGICAL HISTORY:  Reflux  Obesity  DVT (deep venous thrombosis):  neck  Diverticulitis  Hypertension  Elective surgery: abodominal wall surgery  dec 2016  Elective surgery: 2016 gastric bypass revision  Gastric bypass status for obesity: gastric sleeve, 2012  S/P breast biopsy: , left  S/P colon resection:   S/P knee surgery: repair ,   right; left  Umbilical hernia:   S/P appendectomy:   S/P tonsillectomy: 1967      REVIEW OF SYSTEMS      General:	    Skin/Breast:  	  Ophthalmologic:  	  ENMT:	    Respiratory and Thorax:  	  Cardiovascular:	    Gastrointestinal:	    Genitourinary:	    Musculoskeletal:	    Neurological:	    Psychiatric:	    Hematology/Lymphatics:	    Endocrine:	    Allergic/Immunologic:	    ICU Vital Signs Last 24 Hrs  T(C): 37.1 (2017 10:00), Max: 37.3 (2017 01:20)  T(F): 98.7 (2017 10:00), Max: 99.2 (2017 01:20)  HR: 90 (2017 13:00) (72 - 108)  BP: 101/67 (2017 11:00) (68/50 - 112/63)  BP(mean): 78 (2017 11:00) (61 - 83)  ABP: 113/69 (2017 13:00) (70/44 - 122/73)  ABP(mean): 87 (2017 13:00) (66 - 88)  RR: 25 (2017 13:00) (10 - 26)  SpO2: 97% (2017 13:00) (95% - 100%)      ABG - ( 2017 09:31 )  pH: 7.31  /  pCO2: 36    /  pO2: 89    / HCO3: 18    / Base Excess: -7.6  /  SaO2: 96                  I&O's Detail    2017 07:01  -  2017 07:00  --------------------------------------------------------  IN:    FFP (Fresh Frozen Plasma): 400 mL    IV PiggyBack: 434 mL    Lactated Ringers IV Bolus: 2000 mL    lactated ringers.: 2500 mL    sodium chloride 0.9%: 1500 mL    Sodium Chloride 0.9% IV Bolus: 2000 mL  Total IN: 8834 mL    OUT:    Bulb: 490 mL    Indwelling Catheter - Urethral: 1036 mL  Total OUT: 1526 mL    Total NET: 7308 mL      2017 07:01  -  2017 14:15  --------------------------------------------------------  IN:    dextrose 5% + lactated ringers: 525 mL    IV PiggyBack: 350 mL    lactated ringers.: 175 mL  Total IN: 1050 mL    OUT:    Bulb: 20 mL    Indwelling Catheter - Urethral: 144 mL    Nasoenteral Tube: 50 mL  Total OUT: 214 mL    Total NET: 836 mL              Physical exam:    Gen:  NAD.  HEENT: PERRL, EOMI  Neuro: A/Ox4 moves all extremities.  CV: S1,S2 no MGR   Pulm: CTA BL  Abd: Soft, NT/ND, prevena wound vac in place and functioning over midline ex lap wound, right abdominal CHECO drain (right mediastinal drain) with sanguinous output.  : Li draining yellow urine  Ext: WWP with mild peripheral edema  Vasc: 2+ radial and DP pulses  MSK: no joint swelling  Skin: no rash      LABS:  CBC Full  -  ( 2017 09:32 )  WBC Count : 11.7 K/uL  Hemoglobin : 11.1 g/dL  Hematocrit : 31.8 %  Platelet Count - Automated : 104 K/uL  Mean Cell Volume : 85.0 fL  Mean Cell Hemoglobin : 29.7 pg  Mean Cell Hemoglobin Concentration : 34.9 g/dL          140  |  111<H>  |  18  ----------------------------<  113<H>  4.1   |  17<L>  |  1.00    Ca    7.7<L>      2017 05:34  Phos  4.3     07-25  Mg     1.2     07-25      PT/INR - ( 2017 05:34 )   PT: 14.2 sec;   INR: 1.27          PTT - ( 2017 05:34 )  PTT:28.3 sec  Urinalysis Basic - ( 2017 19:18 )    Color: Yellow / Appearance: Clear / S.025 / pH: x  Gluc: x / Ketone: NEGATIVE  / Bili: NEGATIVE / Urobili: 0.2 E.U./dL   Blood: x / Protein: NEGATIVE mg/dL / Nitrite: NEGATIVE   Leuk Esterase: NEGATIVE / RBC: x / WBC x   Sq Epi: x / Non Sq Epi: x / Bacteria: x        RADIOLOGY & ADDITIONAL STUDIES:    CRITICAL CARE TIME SPENT: INTERVAL HPI/OVERNIGHT EVENTS:      O/N: Intra-op, EBL 2L; given 5L LR, 500cc Alb, 5U PRBCs. Post-op labs: Hgb stable 13.4 from 12.2 pre-op; lactate 3.7. Became hypotensive SBP 60s-70s w/ decr UOP 10cc/hr; bolused 3L (1LR, 2NS) w/ good response.     Pt seen and examined at bedside. endorses incisional pain. Denies cp, sob, fever/chills, cough, fever, nausea, vomiting. Denies flatus.    PRESSORS: [ ] YES [X ] NO  WHICH:  DOSE:    ANTIBIOTICS:   Vancomycin               DATE STARTED:      MEDICATIONS  (STANDING):  vancomycin  IVPB 1250 milliGRAM(s) IV Intermittent every 12 hours  dextrose 5% + lactated ringers 1000 milliLiter(s) (175 mL/Hr) IV Continuous <Continuous>  fat emulsion 20% Infusion 50 Gram(s) (20.83 mL/Hr) IV Continuous <Continuous>  insulin lispro (HumaLOG) corrective regimen sliding scale   SubCutaneous Before meals and at bedtime  dextrose 5%. 1000 milliLiter(s) (50 mL/Hr) IV Continuous <Continuous>  dextrose 50% Injectable 25 Gram(s) IV Push once  Parenteral Nutrition - Adult 1 Each (50 mL/Hr) TPN Continuous <Continuous>  sodium chloride 0.9% lock flush 20 milliLiter(s) IV Push once    MEDICATIONS  (PRN):  ondansetron Injectable 4 milliGRAM(s) IV Push every 6 hours PRN Nausea  HYDROmorphone  Injectable 0.5 milliGRAM(s) IV Push every 4 hours PRN Moderate Pain (4 - 6)  HYDROmorphone  Injectable 1 milliGRAM(s) IV Push every 4 hours PRN Severe Pain (7 - 10)  sodium chloride 0.9% lock flush 10 milliLiter(s) IV Push every 1 hour PRN After each medication administration  sodium chloride 0.9% lock flush 10 milliLiter(s) IV Push every 12 hours PRN Lumen of catheter NOT used      Drug Dosing Weight  Height (cm): 160.02 (2017 06:32)  Weight (kg): 75.447460184 (2017 07:10)  BMI (kg/m2): 29.5 (2017 07:10)  BSA (m2): 1.79 (2017 07:10)    CENTRAL LINE: [ ] YES [X ] NO  LOCATION:   DATE INSERTED:  REMOVE: [ ] YES [ ] NO  EXPLAIN:    LI: [X ] YES [ ] NO    DATE INSERTED:   REMOVE: [ ] YES [X ] NO  EXPLAIN: Monitor urine output    A-LINE: [X ] YES [ ] NO  LOCATION:   DATE INSERTED:  REMOVE: [ ] YES [X ] NO  EXPLAIN: Continuous BP monitoring    PAST MEDICAL & SURGICAL HISTORY:  Reflux  Obesity  DVT (deep venous thrombosis):  neck  Diverticulitis  Hypertension  Elective surgery: abodominal wall surgery  dec 2016  Elective surgery: 2016 gastric bypass revision  Gastric bypass status for obesity: gastric sleeve, 2012  S/P breast biopsy: , left  S/P colon resection:   S/P knee surgery: repair ,   right; left  Umbilical hernia:   S/P appendectomy:   S/P tonsillectomy:       REVIEW OF SYSTEMS      Pertinent findings discussed above.  	    ICU Vital Signs Last 24 Hrs  T(C): 37.1 (2017 10:00), Max: 37.3 (2017 01:20)  T(F): 98.7 (2017 10:00), Max: 99.2 (2017 01:20)  HR: 90 (2017 13:00) (72 - 108)  BP: 101/67 (2017 11:00) (68/50 - 112/63)  BP(mean): 78 (2017 11:00) (61 - 83)  ABP: 113/69 (2017 13:00) (70/44 - 122/73)  ABP(mean): 87 (2017 13:00) (66 - 88)  RR: 25 (2017 13:00) (10 - 26)  SpO2: 97% (2017 13:00) (95% - 100%)      ABG - ( 2017 09:31 )  pH: 7.31  /  pCO2: 36    /  pO2: 89    / HCO3: 18    / Base Excess: -7.6  /  SaO2: 96                  I&O's Detail    2017 07:01  -  2017 07:00  --------------------------------------------------------  IN:    FFP (Fresh Frozen Plasma): 400 mL    IV PiggyBack: 434 mL    Lactated Ringers IV Bolus: 2000 mL    lactated ringers.: 2500 mL    sodium chloride 0.9%: 1500 mL    Sodium Chloride 0.9% IV Bolus: 2000 mL  Total IN: 8834 mL    OUT:    Bulb: 490 mL    Indwelling Catheter - Urethral: 1036 mL  Total OUT: 1526 mL    Total NET: 7308 mL      2017 07:01  -  2017 14:15  --------------------------------------------------------  IN:    dextrose 5% + lactated ringers: 525 mL    IV PiggyBack: 350 mL    lactated ringers.: 175 mL  Total IN: 1050 mL    OUT:    Bulb: 20 mL    Indwelling Catheter - Urethral: 144 mL    Nasoenteral Tube: 50 mL  Total OUT: 214 mL    Total NET: 836 mL              Physical exam:    Gen:  NAD.  HEENT: PERRL, EOMI  Neuro: A/Ox4 moves all extremities.  CV: S1,S2 no MGR   Pulm: CTA BL  Abd: Soft, NT/ND, prevena wound vac in place and functioning over midline ex lap wound, right abdominal CHECO drain (right mediastinal drain) with sanguinous output.  : Li draining yellow urine  Ext: WWP with mild peripheral edema  Vasc: 2+ radial and DP pulses  MSK: no joint swelling  Skin: no rash      LABS:  CBC Full  -  ( 2017 09:32 )  WBC Count : 11.7 K/uL  Hemoglobin : 11.1 g/dL  Hematocrit : 31.8 %  Platelet Count - Automated : 104 K/uL  Mean Cell Volume : 85.0 fL  Mean Cell Hemoglobin : 29.7 pg  Mean Cell Hemoglobin Concentration : 34.9 g/dL          140  |  111<H>  |  18  ----------------------------<  113<H>  4.1   |  17<L>  |  1.00    Ca    7.7<L>      2017 05:34  Phos  4.3     07-25  Mg     1.2     07-25      PT/INR - ( 2017 05:34 )   PT: 14.2 sec;   INR: 1.27          PTT - ( 2017 05:34 )  PTT:28.3 sec  Urinalysis Basic - ( 2017 19:18 )    Color: Yellow / Appearance: Clear / S.025 / pH: x  Gluc: x / Ketone: NEGATIVE  / Bili: NEGATIVE / Urobili: 0.2 E.U./dL   Blood: x / Protein: NEGATIVE mg/dL / Nitrite: NEGATIVE   Leuk Esterase: NEGATIVE / RBC: x / WBC x   Sq Epi: x / Non Sq Epi: x / Bacteria: x        RADIOLOGY & ADDITIONAL STUDIES:    CRITICAL CARE TIME SPENT: INTERVAL HPI/OVERNIGHT EVENTS:      O/N: Intra-op, EBL 2L; given 5L LR, 500cc Alb, 5U PRBCs. Post-op labs: Hgb stable 13.4 from 12.2 pre-op; lactate 3.7. Became hypotensive SBP 60s-70s w/ decr UOP 10cc/hr; bolused 3L (1LR, 2NS) w/ good response.     Pt seen and examined at bedside. endorses incisional pain. Denies cp, sob, fever/chills, cough, fever, nausea, vomiting. Denies flatus.    PRESSORS: [ ] YES [X ] NO  WHICH:  DOSE:    ANTIBIOTICS:   Vancomycin               DATE STARTED:      MEDICATIONS  (STANDING):  vancomycin  IVPB 1250 milliGRAM(s) IV Intermittent every 12 hours  dextrose 5% + lactated ringers 1000 milliLiter(s) (175 mL/Hr) IV Continuous <Continuous>  fat emulsion 20% Infusion 50 Gram(s) (20.83 mL/Hr) IV Continuous <Continuous>  insulin lispro (HumaLOG) corrective regimen sliding scale   SubCutaneous Before meals and at bedtime  dextrose 5%. 1000 milliLiter(s) (50 mL/Hr) IV Continuous <Continuous>  dextrose 50% Injectable 25 Gram(s) IV Push once  Parenteral Nutrition - Adult 1 Each (50 mL/Hr) TPN Continuous <Continuous>  sodium chloride 0.9% lock flush 20 milliLiter(s) IV Push once    MEDICATIONS  (PRN):  ondansetron Injectable 4 milliGRAM(s) IV Push every 6 hours PRN Nausea  HYDROmorphone  Injectable 0.5 milliGRAM(s) IV Push every 4 hours PRN Moderate Pain (4 - 6)  HYDROmorphone  Injectable 1 milliGRAM(s) IV Push every 4 hours PRN Severe Pain (7 - 10)  sodium chloride 0.9% lock flush 10 milliLiter(s) IV Push every 1 hour PRN After each medication administration  sodium chloride 0.9% lock flush 10 milliLiter(s) IV Push every 12 hours PRN Lumen of catheter NOT used      Drug Dosing Weight  Height (cm): 160.02 (2017 06:32)  Weight (kg): 75.248431434 (2017 07:10)  BMI (kg/m2): 29.5 (2017 07:10)  BSA (m2): 1.79 (2017 07:10)    CENTRAL LINE: [ ] YES [X ] NO  LOCATION:   DATE INSERTED:  REMOVE: [ ] YES [ ] NO  EXPLAIN:    LI: [X ] YES [ ] NO    DATE INSERTED:   REMOVE: [ ] YES [X ] NO  EXPLAIN: Monitor urine output    A-LINE: [X ] YES [ ] NO  LOCATION:   DATE INSERTED:  REMOVE: [ ] YES [X ] NO  EXPLAIN: Continuous BP monitoring    PAST MEDICAL & SURGICAL HISTORY:  Reflux  Obesity  DVT (deep venous thrombosis):  neck  Diverticulitis  Hypertension  Elective surgery: abodominal wall surgery  dec 2016  Elective surgery: 2016 gastric bypass revision  Gastric bypass status for obesity: gastric sleeve, 2012  S/P breast biopsy: , left  S/P colon resection:   S/P knee surgery: repair ,   right; left  Umbilical hernia:   S/P appendectomy:   S/P tonsillectomy:       REVIEW OF SYSTEMS      Pertinent findings discussed above.  	    ICU Vital Signs Last 24 Hrs  T(C): 37.1 (2017 10:00), Max: 37.3 (2017 01:20)  T(F): 98.7 (2017 10:00), Max: 99.2 (2017 01:20)  HR: 90 (2017 13:00) (72 - 108)  BP: 101/67 (2017 11:00) (68/50 - 112/63)  BP(mean): 78 (2017 11:00) (61 - 83)  ABP: 113/69 (2017 13:00) (70/44 - 122/73)  ABP(mean): 87 (2017 13:00) (66 - 88)  RR: 25 (2017 13:00) (10 - 26)  SpO2: 97% (2017 13:00) (95% - 100%)      ABG - ( 2017 09:31 )  pH: 7.31  /  pCO2: 36    /  pO2: 89    / HCO3: 18    / Base Excess: -7.6  /  SaO2: 96                  I&O's Detail    2017 07:01  -  2017 07:00  --------------------------------------------------------  IN:    FFP (Fresh Frozen Plasma): 400 mL    IV PiggyBack: 434 mL    Lactated Ringers IV Bolus: 2000 mL    lactated ringers.: 2500 mL    sodium chloride 0.9%: 1500 mL    Sodium Chloride 0.9% IV Bolus: 2000 mL  Total IN: 8834 mL    OUT:    Bulb: 490 mL    Indwelling Catheter - Urethral: 1036 mL  Total OUT: 1526 mL    Total NET: 7308 mL      2017 07:01  -  2017 14:15  --------------------------------------------------------  IN:    dextrose 5% + lactated ringers: 525 mL    IV PiggyBack: 350 mL    lactated ringers.: 175 mL  Total IN: 1050 mL    OUT:    Bulb: 20 mL    Indwelling Catheter - Urethral: 144 mL    Nasoenteral Tube: 50 mL  Total OUT: 214 mL    Total NET: 836 mL              Physical exam:    Gen:  NAD.  HEENT: PERRL, EOMI  Neuro: A/Ox4 moves all extremities.  CV: S1,S2 no MGR   Pulm: CTA BL  Abd: Soft, NT/ND, prevena wound vac in place and functioning over midline ex lap wound, right abdominal CHECO drain (right mediastinal drain) with sanguinous output.  : Li draining yellow urine  Ext: cool extremities with mild peripheral edema UE  Vasc: 2+ radial and DP pulses  MSK: no joint swelling  Skin: no rash  Psych: appropriate affect      LABS:  CBC Full  -  ( 2017 09:32 )  WBC Count : 11.7 K/uL  Hemoglobin : 11.1 g/dL  Hematocrit : 31.8 %  Platelet Count - Automated : 104 K/uL  Mean Cell Volume : 85.0 fL  Mean Cell Hemoglobin : 29.7 pg  Mean Cell Hemoglobin Concentration : 34.9 g/dL          140  |  111<H>  |  18  ----------------------------<  113<H>  4.1   |  17<L>  |  1.00    Ca    7.7<L>      2017 05:34  Phos  4.3     07-25  Mg     1.2     07-25      PT/INR - ( 2017 05:34 )   PT: 14.2 sec;   INR: 1.27          PTT - ( 2017 05:34 )  PTT:28.3 sec  Urinalysis Basic - ( 2017 19:18 )    Color: Yellow / Appearance: Clear / S.025 / pH: x  Gluc: x / Ketone: NEGATIVE  / Bili: NEGATIVE / Urobili: 0.2 E.U./dL   Blood: x / Protein: NEGATIVE mg/dL / Nitrite: NEGATIVE   Leuk Esterase: NEGATIVE / RBC: x / WBC x   Sq Epi: x / Non Sq Epi: x / Bacteria: x        RADIOLOGY & ADDITIONAL STUDIES:    CRITICAL CARE TIME SPENT:

## 2017-07-26 LAB
ANION GAP SERPL CALC-SCNC: 12 MMOL/L — SIGNIFICANT CHANGE UP (ref 5–17)
BUN SERPL-MCNC: 23 MG/DL — SIGNIFICANT CHANGE UP (ref 7–23)
CALCIUM SERPL-MCNC: 8.2 MG/DL — LOW (ref 8.4–10.5)
CHLORIDE SERPL-SCNC: 105 MMOL/L — SIGNIFICANT CHANGE UP (ref 96–108)
CO2 SERPL-SCNC: 20 MMOL/L — LOW (ref 22–31)
CREAT SERPL-MCNC: 1.1 MG/DL — SIGNIFICANT CHANGE UP (ref 0.5–1.3)
GENTAMICIN TROUGH SERPL-MCNC: 7.8 UG/ML — CRITICAL HIGH (ref 0–2)
GLUCOSE SERPL-MCNC: 122 MG/DL — HIGH (ref 70–99)
HCT VFR BLD CALC: 26.8 % — LOW (ref 34.5–45)
HGB BLD-MCNC: 9 G/DL — LOW (ref 11.5–15.5)
MAGNESIUM SERPL-MCNC: 1.8 MG/DL — SIGNIFICANT CHANGE UP (ref 1.6–2.6)
MCHC RBC-ENTMCNC: 28.7 PG — SIGNIFICANT CHANGE UP (ref 27–34)
MCHC RBC-ENTMCNC: 33.6 G/DL — SIGNIFICANT CHANGE UP (ref 32–36)
MCV RBC AUTO: 85.4 FL — SIGNIFICANT CHANGE UP (ref 80–100)
PHOSPHATE SERPL-MCNC: 3.1 MG/DL — SIGNIFICANT CHANGE UP (ref 2.5–4.5)
PLATELET # BLD AUTO: 111 K/UL — LOW (ref 150–400)
POTASSIUM SERPL-MCNC: 4.3 MMOL/L — SIGNIFICANT CHANGE UP (ref 3.5–5.3)
POTASSIUM SERPL-SCNC: 4.3 MMOL/L — SIGNIFICANT CHANGE UP (ref 3.5–5.3)
RBC # BLD: 3.14 M/UL — LOW (ref 3.8–5.2)
RBC # FLD: 16.8 % — SIGNIFICANT CHANGE UP (ref 10.3–16.9)
SODIUM SERPL-SCNC: 137 MMOL/L — SIGNIFICANT CHANGE UP (ref 135–145)
TRIGL SERPL-MCNC: 228 MG/DL — HIGH (ref 10–149)
WBC # BLD: 5.4 K/UL — SIGNIFICANT CHANGE UP (ref 3.8–10.5)
WBC # FLD AUTO: 5.4 K/UL — SIGNIFICANT CHANGE UP (ref 3.8–10.5)

## 2017-07-26 PROCEDURE — 99233 SBSQ HOSP IP/OBS HIGH 50: CPT | Mod: GC

## 2017-07-26 RX ORDER — I.V. FAT EMULSION 20 G/100ML
50 EMULSION INTRAVENOUS
Qty: 0 | Refills: 0 | Status: DISCONTINUED | OUTPATIENT
Start: 2017-07-26 | End: 2017-07-26

## 2017-07-26 RX ORDER — DEXTROSE 50 % IN WATER 50 %
12.5 SYRINGE (ML) INTRAVENOUS ONCE
Qty: 0 | Refills: 0 | Status: COMPLETED | OUTPATIENT
Start: 2017-07-26 | End: 2017-07-26

## 2017-07-26 RX ORDER — ACETAMINOPHEN 500 MG
1000 TABLET ORAL ONCE
Qty: 0 | Refills: 0 | Status: COMPLETED | OUTPATIENT
Start: 2017-07-26 | End: 2017-07-26

## 2017-07-26 RX ORDER — ELECTROLYTE SOLUTION,INJ
1 VIAL (ML) INTRAVENOUS
Qty: 0 | Refills: 0 | Status: DISCONTINUED | OUTPATIENT
Start: 2017-07-26 | End: 2017-07-26

## 2017-07-26 RX ORDER — DEXTROSE 50 % IN WATER 50 %
25 SYRINGE (ML) INTRAVENOUS ONCE
Qty: 0 | Refills: 0 | Status: COMPLETED | OUTPATIENT
Start: 2017-07-26 | End: 2017-07-26

## 2017-07-26 RX ORDER — SODIUM CHLORIDE 9 MG/ML
1000 INJECTION, SOLUTION INTRAVENOUS
Qty: 0 | Refills: 0 | Status: DISCONTINUED | OUTPATIENT
Start: 2017-07-26 | End: 2017-07-27

## 2017-07-26 RX ORDER — PREGABALIN 225 MG/1
1000 CAPSULE ORAL
Qty: 0 | Refills: 0 | Status: DISCONTINUED | OUTPATIENT
Start: 2017-07-26 | End: 2017-12-27

## 2017-07-26 RX ORDER — HEPARIN SODIUM 5000 [USP'U]/ML
5000 INJECTION INTRAVENOUS; SUBCUTANEOUS EVERY 8 HOURS
Qty: 0 | Refills: 0 | Status: DISCONTINUED | OUTPATIENT
Start: 2017-07-26 | End: 2017-07-28

## 2017-07-26 RX ORDER — SODIUM CHLORIDE 9 MG/ML
1000 INJECTION, SOLUTION INTRAVENOUS
Qty: 0 | Refills: 0 | Status: DISCONTINUED | OUTPATIENT
Start: 2017-07-26 | End: 2017-07-26

## 2017-07-26 RX ORDER — HYDROMORPHONE HYDROCHLORIDE 2 MG/ML
0.5 INJECTION INTRAMUSCULAR; INTRAVENOUS; SUBCUTANEOUS ONCE
Qty: 0 | Refills: 0 | Status: DISCONTINUED | OUTPATIENT
Start: 2017-07-26 | End: 2017-07-26

## 2017-07-26 RX ORDER — DEXTROSE 50 % IN WATER 50 %
12.5 SYRINGE (ML) INTRAVENOUS ONCE
Qty: 0 | Refills: 0 | Status: DISCONTINUED | OUTPATIENT
Start: 2017-07-26 | End: 2018-01-23

## 2017-07-26 RX ADMIN — Medication 400 MILLIGRAM(S): at 23:25

## 2017-07-26 RX ADMIN — SODIUM CHLORIDE 175 MILLILITER(S): 9 INJECTION, SOLUTION INTRAVENOUS at 17:48

## 2017-07-26 RX ADMIN — ONDANSETRON 4 MILLIGRAM(S): 8 TABLET, FILM COATED ORAL at 07:22

## 2017-07-26 RX ADMIN — HYDROMORPHONE HYDROCHLORIDE 0.5 MILLIGRAM(S): 2 INJECTION INTRAMUSCULAR; INTRAVENOUS; SUBCUTANEOUS at 16:00

## 2017-07-26 RX ADMIN — Medication 1 EACH: at 18:33

## 2017-07-26 RX ADMIN — LINEZOLID 300 MILLIGRAM(S): 600 INJECTION, SOLUTION INTRAVENOUS at 09:58

## 2017-07-26 RX ADMIN — Medication 1000 MILLIGRAM(S): at 02:59

## 2017-07-26 RX ADMIN — I.V. FAT EMULSION 20.83 GRAM(S): 20 EMULSION INTRAVENOUS at 22:31

## 2017-07-26 RX ADMIN — HYDROMORPHONE HYDROCHLORIDE 0.5 MILLIGRAM(S): 2 INJECTION INTRAMUSCULAR; INTRAVENOUS; SUBCUTANEOUS at 09:08

## 2017-07-26 RX ADMIN — HYDROMORPHONE HYDROCHLORIDE 0.5 MILLIGRAM(S): 2 INJECTION INTRAMUSCULAR; INTRAVENOUS; SUBCUTANEOUS at 12:08

## 2017-07-26 RX ADMIN — PREGABALIN 1000 MICROGRAM(S): 225 CAPSULE ORAL at 13:46

## 2017-07-26 RX ADMIN — HYDROMORPHONE HYDROCHLORIDE 1 MILLIGRAM(S): 2 INJECTION INTRAMUSCULAR; INTRAVENOUS; SUBCUTANEOUS at 22:42

## 2017-07-26 RX ADMIN — HYDROMORPHONE HYDROCHLORIDE 0.5 MILLIGRAM(S): 2 INJECTION INTRAMUSCULAR; INTRAVENOUS; SUBCUTANEOUS at 04:37

## 2017-07-26 RX ADMIN — Medication 400 MILLIGRAM(S): at 02:33

## 2017-07-26 RX ADMIN — LINEZOLID 300 MILLIGRAM(S): 600 INJECTION, SOLUTION INTRAVENOUS at 21:46

## 2017-07-26 RX ADMIN — Medication 25 GRAM(S): at 15:50

## 2017-07-26 RX ADMIN — HYDROMORPHONE HYDROCHLORIDE 0.5 MILLIGRAM(S): 2 INJECTION INTRAMUSCULAR; INTRAVENOUS; SUBCUTANEOUS at 15:37

## 2017-07-26 RX ADMIN — HEPARIN SODIUM 5000 UNIT(S): 5000 INJECTION INTRAVENOUS; SUBCUTANEOUS at 21:46

## 2017-07-26 RX ADMIN — HYDROMORPHONE HYDROCHLORIDE 0.5 MILLIGRAM(S): 2 INJECTION INTRAMUSCULAR; INTRAVENOUS; SUBCUTANEOUS at 13:08

## 2017-07-26 RX ADMIN — Medication 12.5 GRAM(S): at 23:25

## 2017-07-26 RX ADMIN — SODIUM CHLORIDE 80 MILLILITER(S): 9 INJECTION, SOLUTION INTRAVENOUS at 19:09

## 2017-07-26 RX ADMIN — HYDROMORPHONE HYDROCHLORIDE 0.5 MILLIGRAM(S): 2 INJECTION INTRAMUSCULAR; INTRAVENOUS; SUBCUTANEOUS at 09:30

## 2017-07-26 RX ADMIN — HYDROMORPHONE HYDROCHLORIDE 1 MILLIGRAM(S): 2 INJECTION INTRAMUSCULAR; INTRAVENOUS; SUBCUTANEOUS at 22:55

## 2017-07-26 RX ADMIN — HYDROMORPHONE HYDROCHLORIDE 0.5 MILLIGRAM(S): 2 INJECTION INTRAMUSCULAR; INTRAVENOUS; SUBCUTANEOUS at 05:51

## 2017-07-26 NOTE — PROGRESS NOTE ADULT - SUBJECTIVE AND OBJECTIVE BOX
Pt seen and examined at bedside this AM...    INTERVAL HPI/OVERNIGHT EVENTS:    PRESSORS: [ ] YES [ ] NO  WHICH:  DOSE:    ANTIBIOTICS:                  DATE STARTED:  ANTIBIOTICS:                  DATE STARTED:  ANTIBIOTICS:                  DATE STARTED:    MEDICATIONS  (STANDING):  fat emulsion 20% Infusion 50 Gram(s) (20.83 mL/Hr) IV Continuous <Continuous>  insulin lispro (HumaLOG) corrective regimen sliding scale   SubCutaneous Before meals and at bedtime  dextrose 5%. 1000 milliLiter(s) (50 mL/Hr) IV Continuous <Continuous>  dextrose 50% Injectable 25 Gram(s) IV Push once  Parenteral Nutrition - Adult 1 Each (50 mL/Hr) TPN Continuous <Continuous>  sodium chloride 0.9% lock flush 20 milliLiter(s) IV Push once  linezolid  IVPB 600 milliGRAM(s) IV Intermittent every 12 hours  cyanocobalamin Injectable 1000 MICROGram(s) SubCutaneous every 7 days  fat emulsion 20% Infusion 50 Gram(s) (20.83 mL/Hr) IV Continuous <Continuous>  lactated ringers. 1000 milliLiter(s) (175 mL/Hr) IV Continuous <Continuous>  Parenteral Nutrition - Adult 1 Each (58 mL/Hr) TPN Continuous <Continuous>    MEDICATIONS  (PRN):  ondansetron Injectable 4 milliGRAM(s) IV Push every 6 hours PRN Nausea  HYDROmorphone  Injectable 0.5 milliGRAM(s) IV Push every 4 hours PRN Moderate Pain (4 - 6)  HYDROmorphone  Injectable 1 milliGRAM(s) IV Push every 4 hours PRN Severe Pain (7 - 10)  sodium chloride 0.9% lock flush 10 milliLiter(s) IV Push every 1 hour PRN After each medication administration  sodium chloride 0.9% lock flush 10 milliLiter(s) IV Push every 12 hours PRN Lumen of catheter NOT used      Drug Dosing Weight  Height (cm): 160.02 (2017 06:32)  Weight (kg): 75.011307685 (2017 07:10)  BMI (kg/m2): 29.5 (2017 07:10)  BSA (m2): 1.79 (2017 07:10)    CENTRAL LINE: [ ] YES [ ] NO  LOCATION:   DATE INSERTED:  REMOVE: [ ] YES [ ] NO  EXPLAIN:    LI: [ ] YES [ ] NO    DATE INSERTED:  REMOVE: [ ] YES [ ] NO  EXPLAIN:    A-LINE: [ ] YES [ ] NO  LOCATION:   DATE INSERTED:  REMOVE: [ ] YES [ ] NO  EXPLAIN:    PAST MEDICAL & SURGICAL HISTORY:  Reflux  Obesity  DVT (deep venous thrombosis):  neck  Diverticulitis  Hypertension  Elective surgery: abodominal wall surgery  dec 2016  Elective surgery: 2016 gastric bypass revision  Gastric bypass status for obesity: gastric sleeve, 2012  S/P breast biopsy: , left  S/P colon resection:   S/P knee surgery: repair ,   right; left  Umbilical hernia:   S/P appendectomy:   S/P tonsillectomy:       REVIEW OF SYSTEMS      General:	    Skin/Breast:  	  Ophthalmologic:  	  ENMT:	    Respiratory and Thorax:  	  Cardiovascular:	    Gastrointestinal:	    Genitourinary:	    Musculoskeletal:	    Neurological:	    Psychiatric:	    Hematology/Lymphatics:	    Endocrine:	    Allergic/Immunologic:	    ICU Vital Signs Last 24 Hrs  T(C): 37.3 (2017 09:00), Max: 38.6 (2017 21:19)  T(F): 99.2 (2017 09:00), Max: 101.4 (2017 21:19)  HR: 106 (2017 13:00) (0 - 114)  BP: 101/60 (2017 12:30) (82/63 - 132/86)  BP(mean): 75 (2017 12:30) (57 - 107)  ABP: 104/56 (2017 13:00) (74/52 - 136/78)  ABP(mean): 74 (2017 13:00) (62 - 100)  RR: 30 (2017 13:00) (14 - 35)  SpO2: 96% (2017 13:00) (91% - 99%)      ABG - ( 2017 09:31 )  pH: 7.31  /  pCO2: 36    /  pO2: 89    / HCO3: 18    / Base Excess: -7.6  /  SaO2: 96                  I&O's Detail    2017 07:01  -  2017 07:00  --------------------------------------------------------  IN:    dextrose 5% + lactated ringers: 1575 mL    Fat Emulsion 20%: 208 mL    IV PiggyBack: 1100 mL    Lactated Ringers IV Bolus: 2000 mL    lactated ringers.: 175 mL    lactated ringers.: 1925 mL    TPN (Total Parenteral Nutrition): 700 mL  Total IN: 7683 mL    OUT:    Bulb: 70 mL    Indwelling Catheter - Urethral: 877 mL    Nasoenteral Tube: 100 mL  Total OUT: 1047 mL    Total NET: 6636 mL      2017 07:01  -  2017 13:40  --------------------------------------------------------  IN:    Fat Emulsion 20%: 41.6 mL    IV PiggyBack: 300 mL    lactated ringers.: 525 mL    lactated ringers.: 350 mL    TPN (Total Parenteral Nutrition): 300 mL  Total IN: 1516.6 mL    OUT:    Indwelling Catheter - Urethral: 285 mL  Total OUT: 285 mL    Total NET: 1231.6 mL              Physical exam:      LABS:  CBC Full  -  ( 2017 05:58 )  WBC Count : 5.4 K/uL  Hemoglobin : 9.0 g/dL  Hematocrit : 26.8 %  Platelet Count - Automated : 111 K/uL  Mean Cell Volume : 85.4 fL  Mean Cell Hemoglobin : 28.7 pg  Mean Cell Hemoglobin Concentration : 33.6 g/dL  Auto Neutrophil # : x  Auto Lymphocyte # : x  Auto Monocyte # : x  Auto Eosinophil # : x  Auto Basophil # : x  Auto Neutrophil % : x  Auto Lymphocyte % : x  Auto Monocyte % : x  Auto Eosinophil % : x  Auto Basophil % : x        137  |  105  |  23  ----------------------------<  122<H>  4.3   |  20<L>  |  1.10    Ca    8.2<L>      2017 05:57  Phos  3.1       Mg     1.8           PT/INR - ( 2017 05:34 )   PT: 14.2 sec;   INR: 1.27          PTT - ( 2017 05:34 )  PTT:28.3 sec  Urinalysis Basic - ( 2017 21:00 )    Color: Yellow / Appearance: Clear / S.025 / pH: x  Gluc: x / Ketone: NEGATIVE  / Bili: NEGATIVE / Urobili: 0.2 E.U./dL   Blood: x / Protein: 30 mg/dL / Nitrite: NEGATIVE   Leuk Esterase: NEGATIVE / RBC: Many /HPF / WBC 5-10 /HPF   Sq Epi: x / Non Sq Epi: Few /HPF / Bacteria: Present /HPF        RADIOLOGY & ADDITIONAL STUDIES:    CRITICAL CARE TIME SPENT: Pt seen and examined at bedside this AM, already sitting up in chair and noting that she slept very well and was feeling well. Denies CP, SOB,  N/V, dysuria and notes that her pain has been much better controlled since yesterday.    INTERVAL HPI/OVERNIGHT EVENTS:  O/N: Temp 101.4, UA negative, bld cx sent, then got linezolid afterwards, bolused 1 liter for SBP 70's. UA with granular cast,   : Decreased fluids, PICC/TPN, ordererd UA looking for casts with concern for ATN. cr increased ordered vanc level @9pm tonight ( before 3rd dose )    MEDICATIONS  (STANDING):  fat emulsion 20% Infusion 50 Gram(s) (20.83 mL/Hr) IV Continuous <Continuous>  insulin lispro (HumaLOG) corrective regimen sliding scale   SubCutaneous Before meals and at bedtime  dextrose 5%. 1000 milliLiter(s) (50 mL/Hr) IV Continuous <Continuous>  dextrose 50% Injectable 25 Gram(s) IV Push once  Parenteral Nutrition - Adult 1 Each (50 mL/Hr) TPN Continuous <Continuous>  sodium chloride 0.9% lock flush 20 milliLiter(s) IV Push once  linezolid  IVPB 600 milliGRAM(s) IV Intermittent every 12 hours  cyanocobalamin Injectable 1000 MICROGram(s) SubCutaneous every 7 days  fat emulsion 20% Infusion 50 Gram(s) (20.83 mL/Hr) IV Continuous <Continuous>  lactated ringers. 1000 milliLiter(s) (175 mL/Hr) IV Continuous <Continuous>  Parenteral Nutrition - Adult 1 Each (58 mL/Hr) TPN Continuous <Continuous>    MEDICATIONS  (PRN):  ondansetron Injectable 4 milliGRAM(s) IV Push every 6 hours PRN Nausea  HYDROmorphone  Injectable 0.5 milliGRAM(s) IV Push every 4 hours PRN Moderate Pain (4 - 6)  HYDROmorphone  Injectable 1 milliGRAM(s) IV Push every 4 hours PRN Severe Pain (7 - 10)  sodium chloride 0.9% lock flush 10 milliLiter(s) IV Push every 1 hour PRN After each medication administration  sodium chloride 0.9% lock flush 10 milliLiter(s) IV Push every 12 hours PRN Lumen of catheter NOT used      Drug Dosing Weight  Height (cm): 160.02 (2017 06:32)  Weight (kg): 75.643055159 (2017 07:10)  BMI (kg/m2): 29.5 (2017 07:10)  BSA (m2): 1.79 (2017 07:10)      PAST MEDICAL & SURGICAL HISTORY:  Reflux  Obesity  DVT (deep venous thrombosis):  neck  Diverticulitis  Hypertension  Elective surgery: abodominal wall surgery  dec 2016  Elective surgery: 2016 gastric bypass revision  Gastric bypass status for obesity: gastric sleeve, 2012  S/P breast biopsy: , left  S/P colon resection:   S/P knee surgery: repair ,   right; left  Umbilical hernia:   S/P appendectomy:   S/P tonsillectomy:       ICU Vital Signs Last 24 Hrs  T(C): 37.3 (2017 09:00), Max: 38.6 (2017 21:19)  T(F): 99.2 (2017 09:00), Max: 101.4 (2017 21:19)  HR: 106 (2017 13:00) (0 - 114)  BP: 101/60 (2017 12:30) (82/63 - 132/86)  BP(mean): 75 (2017 12:30) (57 - 107)  ABP: 104/56 (2017 13:00) (74/52 - 136/78)  ABP(mean): 74 (2017 13:00) (62 - 100)  RR: 30 (2017 13:00) (14 - 35)  SpO2: 96% (2017 13:00) (91% - 99%)      ABG - ( 2017 09:31 )  pH: 7.31  /  pCO2: 36    /  pO2: 89    / HCO3: 18    / Base Excess: -7.6  /  SaO2: 96                  I&O's Detail    2017 07:01  -  2017 07:00  --------------------------------------------------------  IN:    dextrose 5% + lactated ringers: 1575 mL    Fat Emulsion 20%: 208 mL    IV PiggyBack: 1100 mL    Lactated Ringers IV Bolus: 2000 mL    lactated ringers.: 175 mL    lactated ringers.: 1925 mL    TPN (Total Parenteral Nutrition): 700 mL  Total IN: 7683 mL    OUT:    Bulb: 70 mL    Indwelling Catheter - Urethral: 877 mL    Nasoenteral Tube: 100 mL  Total OUT: 1047 mL    Total NET: 6636 mL      2017 07:01  -  2017 13:40  --------------------------------------------------------  IN:    Fat Emulsion 20%: 41.6 mL    IV PiggyBack: 300 mL    lactated ringers.: 525 mL    lactated ringers.: 350 mL    TPN (Total Parenteral Nutrition): 300 mL  Total IN: 1516.6 mL    OUT:    Indwelling Catheter - Urethral: 285 mL  Total OUT: 285 mL    Total NET: 1231.6 mL              Physical exam:  Gen:  NAD.  HEENT: PERRL, EOMI  Neuro: A/Ox4 moves all extremities.  CV: S1,S2 no MGR   Pulm: CTAB with good air movement to bases b/l  Abd: Soft, NT/ND, prevena wound vac in place and functioning over midline ex lap wound, right abdominal CHECO drain (right mediastinal drain) with serosanguinous output.  : Yu draining yellow urine  Ext: WWP, mild peripheral edema UE    LABS:  CBC Full  -  ( 2017 05:58 )  WBC Count : 5.4 K/uL  Hemoglobin : 9.0 g/dL  Hematocrit : 26.8 %  Platelet Count - Automated : 111 K/uL  Mean Cell Volume : 85.4 fL  Mean Cell Hemoglobin : 28.7 pg  Mean Cell Hemoglobin Concentration : 33.6 g/dL          137  |  105  |  23  ----------------------------<  122<H>  4.3   |  20<L>  |  1.10    Ca    8.2<L>      2017 05:57  Phos  3.1       Mg     1.8           PT/INR - ( 2017 05:34 )   PT: 14.2 sec;   INR: 1.27          PTT - ( 2017 05:34 )  PTT:28.3 sec  Urinalysis Basic - ( 2017 21:00 )    Color: Yellow / Appearance: Clear / S.025 / pH: x  Gluc: x / Ketone: NEGATIVE  / Bili: NEGATIVE / Urobili: 0.2 E.U./dL   Blood: x / Protein: 30 mg/dL / Nitrite: NEGATIVE   Leuk Esterase: NEGATIVE / RBC: Many /HPF / WBC 5-10 /HPF   Sq Epi: x / Non Sq Epi: Few /HPF / Bacteria: Present /HPF Pt seen and examined at bedside this AM, already sitting up in chair and noting that she slept very well and was feeling well. Denies CP, SOB,  N/V, dysuria and notes that her pain has been much better controlled since yesterday.    INTERVAL HPI/OVERNIGHT EVENTS:  O/N: Temp 101.4, UA negative, bld cx sent, then got linezolid afterwards, bolused 1 liter for SBP 70's. UA with granular cast,   : Decreased fluids, PICC/TPN, ordererd UA looking for casts with concern for ATN. cr increased ordered vanc level @9pm tonight ( before 3rd dose )    MEDICATIONS  (STANDING):  fat emulsion 20% Infusion 50 Gram(s) (20.83 mL/Hr) IV Continuous <Continuous>  insulin lispro (HumaLOG) corrective regimen sliding scale   SubCutaneous Before meals and at bedtime  dextrose 5%. 1000 milliLiter(s) (50 mL/Hr) IV Continuous <Continuous>  dextrose 50% Injectable 25 Gram(s) IV Push once  Parenteral Nutrition - Adult 1 Each (50 mL/Hr) TPN Continuous <Continuous>  sodium chloride 0.9% lock flush 20 milliLiter(s) IV Push once  linezolid  IVPB 600 milliGRAM(s) IV Intermittent every 12 hours  cyanocobalamin Injectable 1000 MICROGram(s) SubCutaneous every 7 days  fat emulsion 20% Infusion 50 Gram(s) (20.83 mL/Hr) IV Continuous <Continuous>  lactated ringers. 1000 milliLiter(s) (175 mL/Hr) IV Continuous <Continuous>  Parenteral Nutrition - Adult 1 Each (58 mL/Hr) TPN Continuous <Continuous>    MEDICATIONS  (PRN):  ondansetron Injectable 4 milliGRAM(s) IV Push every 6 hours PRN Nausea  HYDROmorphone  Injectable 0.5 milliGRAM(s) IV Push every 4 hours PRN Moderate Pain (4 - 6)  HYDROmorphone  Injectable 1 milliGRAM(s) IV Push every 4 hours PRN Severe Pain (7 - 10)  sodium chloride 0.9% lock flush 10 milliLiter(s) IV Push every 1 hour PRN After each medication administration  sodium chloride 0.9% lock flush 10 milliLiter(s) IV Push every 12 hours PRN Lumen of catheter NOT used      Drug Dosing Weight  Height (cm): 160.02 (2017 06:32)  Weight (kg): 75.822736228 (2017 07:10)  BMI (kg/m2): 29.5 (2017 07:10)  BSA (m2): 1.79 (2017 07:10)      PAST MEDICAL & SURGICAL HISTORY:  Reflux  Obesity  DVT (deep venous thrombosis):  neck  Diverticulitis  Hypertension  Elective surgery: abodominal wall surgery  dec 2016  Elective surgery: 2016 gastric bypass revision  Gastric bypass status for obesity: gastric sleeve, 2012  S/P breast biopsy: , left  S/P colon resection:   S/P knee surgery: repair ,   right; left  Umbilical hernia:   S/P appendectomy:   S/P tonsillectomy:       ICU Vital Signs Last 24 Hrs  T(C): 37.3 (2017 09:00), Max: 38.6 (2017 21:19)  T(F): 99.2 (2017 09:00), Max: 101.4 (2017 21:19)  HR: 106 (2017 13:00) (0 - 114)  BP: 101/60 (2017 12:30) (82/63 - 132/86)  BP(mean): 75 (2017 12:30) (57 - 107)  ABP: 104/56 (2017 13:00) (74/52 - 136/78)  ABP(mean): 74 (2017 13:00) (62 - 100)  RR: 30 (2017 13:00) (14 - 35)  SpO2: 96% (2017 13:00) (91% - 99%)      ABG - ( 2017 09:31 )  pH: 7.31  /  pCO2: 36    /  pO2: 89    / HCO3: 18    / Base Excess: -7.6  /  SaO2: 96                  I&O's Detail    2017 07:01  -  2017 07:00  --------------------------------------------------------  IN:    dextrose 5% + lactated ringers: 1575 mL    Fat Emulsion 20%: 208 mL    IV PiggyBack: 1100 mL    Lactated Ringers IV Bolus: 2000 mL    lactated ringers.: 175 mL    lactated ringers.: 1925 mL    TPN (Total Parenteral Nutrition): 700 mL  Total IN: 7683 mL    OUT:    Bulb: 70 mL    Indwelling Catheter - Urethral: 877 mL    Nasoenteral Tube: 100 mL  Total OUT: 1047 mL    Total NET: 6636 mL      2017 07:01  -  2017 13:40  --------------------------------------------------------  IN:    Fat Emulsion 20%: 41.6 mL    IV PiggyBack: 300 mL    lactated ringers.: 525 mL    lactated ringers.: 350 mL    TPN (Total Parenteral Nutrition): 300 mL  Total IN: 1516.6 mL    OUT:    Indwelling Catheter - Urethral: 285 mL  Total OUT: 285 mL    Total NET: 1231.6 mL              Physical exam:  Gen:  NAD.  HEENT: PERRL, EOMI  Neuro: A/Ox4 moves all extremities.  CV: S1,S2 no MGR   Pulm: CTAB with good air movement to bases b/l  Abd: Soft, NT/ND, prevena wound vac in place and functioning over midline ex lap wound, right abdominal CHECO drain (right mediastinal drain) with serosanguinous output.  : Yu draining yellow urine  Ext: WWP, mild peripheral edema UE  Vasc: 2+ radial and DP pulses  MSK: no joint swelling  Skin: no rash    LABS:  CBC Full  -  ( 2017 05:58 )  WBC Count : 5.4 K/uL  Hemoglobin : 9.0 g/dL  Hematocrit : 26.8 %  Platelet Count - Automated : 111 K/uL  Mean Cell Volume : 85.4 fL  Mean Cell Hemoglobin : 28.7 pg  Mean Cell Hemoglobin Concentration : 33.6 g/dL          137  |  105  |  23  ----------------------------<  122<H>  4.3   |  20<L>  |  1.10    Ca    8.2<L>      2017 05:57  Phos  3.1       Mg     1.8     07-26      PT/INR - ( 2017 05:34 )   PT: 14.2 sec;   INR: 1.27          PTT - ( 2017 05:34 )  PTT:28.3 sec  Urinalysis Basic - ( 2017 21:00 )    Color: Yellow / Appearance: Clear / S.025 / pH: x  Gluc: x / Ketone: NEGATIVE  / Bili: NEGATIVE / Urobili: 0.2 E.U./dL   Blood: x / Protein: 30 mg/dL / Nitrite: NEGATIVE   Leuk Esterase: NEGATIVE / RBC: Many /HPF / WBC 5-10 /HPF   Sq Epi: x / Non Sq Epi: Few /HPF / Bacteria: Present /HPF

## 2017-07-26 NOTE — DIETITIAN INITIAL EVALUATION ADULT. - ENERGY NEEDS
52 kg IBW; 76 kg ABW; 146% IBW; BMI 30.   IBW used due to pt > 120% of IBW.  Needs increased 2/2 fistula & need for healing.

## 2017-07-26 NOTE — DIETITIAN INITIAL EVALUATION ADULT. - OTHER INFO
Per MD notes "57F w/PMHx of HTN, gastric bypass in 2002 c/b stricture, corkscrewed sleeve and chronic leak, recently revised on 11/28/16 with protracted (70-day) postoperative course complicated by MDR infections presents for enterocutaneous fistula resection". Currently on TPN with NGT to suction. Pt reports gaining 10# since previous hospital admission earlier in 2017, then lost 7# recently 2/2 medical condition. Reports taking MVI, Vitamin D, & B12 PTA. Diet recall: Breakfast: English muffin with two eggs, Lunch: English muffin with ham & Cheese or Pizza bagel on english muffin. Dinner: English muffin with butter/jelly. No BM yet since admission.

## 2017-07-26 NOTE — PROGRESS NOTE ADULT - ASSESSMENT
57 F s/p  SBR resection, fistula resection, esophagojejunostomy revision admitted to [facility] for hemodynamic monitoring with intermittent hypotension and likely ATN.    Neuro: dilaudid prn   CV: Currently warmer than yesterday, appears to be euvolemic, will continue maintenance fluids for now and watch I/Os  Resp: Satting well on NC  GI/FEN:NJT NPO/ MVI @175/TPN  ID: Started linezolid empirically covering for enterococcus from previous admission.   : Yu; ATN  Heme/PPX: SCDs, starting SQH  Lines: Radha (7/24--), PIVs. PICC line for TPN  Drains: Right mediastinal CHECO in Right abdomen  Wounds: Midline xiphoid to pubis with prevena vac in place 57 F s/p  SBR resection, fistula resection, esophagojejunostomy revision admitted to [facility] for hemodynamic monitoring with intermittent hypotension and likely ATN.    Neuro: dilaudid prn   CV: Currently warmer than yesterday, appears to be euvolemic, will continue maintenance fluids for now and watch I/Os, decrease to 80 ml/hr  Resp: Satting well on NC  GI/FEN:NJT NPO/ MVI @175/TPN  ID: Started linezolid empirically covering for enterococcus from previous admission.   : Yu; ATN appears improved today re UO and creatinine  Heme/PPX: SCDs, starting SQH  Lines: Radha (7/24--), PIVs. PICC line for TPN  Drains: Right mediastinal CHECO in Right abdomen  Wounds: Midline xiphoid to pubis with prevena vac in place

## 2017-07-27 LAB
ANION GAP SERPL CALC-SCNC: 9 MMOL/L — SIGNIFICANT CHANGE UP (ref 5–17)
BUN SERPL-MCNC: 27 MG/DL — HIGH (ref 7–23)
CALCIUM SERPL-MCNC: 8.7 MG/DL — SIGNIFICANT CHANGE UP (ref 8.4–10.5)
CHLORIDE SERPL-SCNC: 102 MMOL/L — SIGNIFICANT CHANGE UP (ref 96–108)
CO2 SERPL-SCNC: 24 MMOL/L — SIGNIFICANT CHANGE UP (ref 22–31)
CREAT SERPL-MCNC: 1 MG/DL — SIGNIFICANT CHANGE UP (ref 0.5–1.3)
GLUCOSE SERPL-MCNC: 137 MG/DL — HIGH (ref 70–99)
HCT VFR BLD CALC: 25.5 % — LOW (ref 34.5–45)
HGB BLD-MCNC: 8.4 G/DL — LOW (ref 11.5–15.5)
MAGNESIUM SERPL-MCNC: 2 MG/DL — SIGNIFICANT CHANGE UP (ref 1.6–2.6)
MCHC RBC-ENTMCNC: 29.2 PG — SIGNIFICANT CHANGE UP (ref 27–34)
MCHC RBC-ENTMCNC: 32.9 G/DL — SIGNIFICANT CHANGE UP (ref 32–36)
MCV RBC AUTO: 88.5 FL — SIGNIFICANT CHANGE UP (ref 80–100)
PHOSPHATE SERPL-MCNC: 2.4 MG/DL — LOW (ref 2.5–4.5)
PLATELET # BLD AUTO: 94 K/UL — LOW (ref 150–400)
POTASSIUM SERPL-MCNC: 3.8 MMOL/L — SIGNIFICANT CHANGE UP (ref 3.5–5.3)
POTASSIUM SERPL-SCNC: 3.8 MMOL/L — SIGNIFICANT CHANGE UP (ref 3.5–5.3)
RBC # BLD: 2.88 M/UL — LOW (ref 3.8–5.2)
RBC # FLD: 16 % — SIGNIFICANT CHANGE UP (ref 10.3–16.9)
SODIUM SERPL-SCNC: 135 MMOL/L — SIGNIFICANT CHANGE UP (ref 135–145)
SURGICAL PATHOLOGY STUDY: SIGNIFICANT CHANGE UP
WBC # BLD: 4.6 K/UL — SIGNIFICANT CHANGE UP (ref 3.8–10.5)
WBC # FLD AUTO: 4.6 K/UL — SIGNIFICANT CHANGE UP (ref 3.8–10.5)

## 2017-07-27 PROCEDURE — 99233 SBSQ HOSP IP/OBS HIGH 50: CPT | Mod: GC

## 2017-07-27 RX ORDER — POTASSIUM PHOSPHATE, MONOBASIC POTASSIUM PHOSPHATE, DIBASIC 236; 224 MG/ML; MG/ML
15 INJECTION, SOLUTION INTRAVENOUS ONCE
Qty: 0 | Refills: 0 | Status: COMPLETED | OUTPATIENT
Start: 2017-07-27 | End: 2017-07-27

## 2017-07-27 RX ORDER — I.V. FAT EMULSION 20 G/100ML
50 EMULSION INTRAVENOUS
Qty: 0 | Refills: 0 | Status: DISCONTINUED | OUTPATIENT
Start: 2017-07-27 | End: 2017-07-27

## 2017-07-27 RX ORDER — SODIUM CHLORIDE 9 MG/ML
1000 INJECTION, SOLUTION INTRAVENOUS ONCE
Qty: 0 | Refills: 0 | Status: COMPLETED | OUTPATIENT
Start: 2017-07-27 | End: 2017-07-27

## 2017-07-27 RX ORDER — ELECTROLYTE SOLUTION,INJ
1 VIAL (ML) INTRAVENOUS
Qty: 0 | Refills: 0 | Status: DISCONTINUED | OUTPATIENT
Start: 2017-07-27 | End: 2017-07-27

## 2017-07-27 RX ORDER — DEXTROSE 50 % IN WATER 50 %
25 SYRINGE (ML) INTRAVENOUS ONCE
Qty: 0 | Refills: 0 | Status: COMPLETED | OUTPATIENT
Start: 2017-07-27 | End: 2017-07-27

## 2017-07-27 RX ADMIN — HYDROMORPHONE HYDROCHLORIDE 1 MILLIGRAM(S): 2 INJECTION INTRAMUSCULAR; INTRAVENOUS; SUBCUTANEOUS at 05:16

## 2017-07-27 RX ADMIN — HYDROMORPHONE HYDROCHLORIDE 0.5 MILLIGRAM(S): 2 INJECTION INTRAMUSCULAR; INTRAVENOUS; SUBCUTANEOUS at 09:36

## 2017-07-27 RX ADMIN — HYDROMORPHONE HYDROCHLORIDE 0.5 MILLIGRAM(S): 2 INJECTION INTRAMUSCULAR; INTRAVENOUS; SUBCUTANEOUS at 16:23

## 2017-07-27 RX ADMIN — Medication 25 MILLILITER(S): at 16:23

## 2017-07-27 RX ADMIN — POTASSIUM PHOSPHATE, MONOBASIC POTASSIUM PHOSPHATE, DIBASIC 62.5 MILLIMOLE(S): 236; 224 INJECTION, SOLUTION INTRAVENOUS at 08:28

## 2017-07-27 RX ADMIN — LINEZOLID 300 MILLIGRAM(S): 600 INJECTION, SOLUTION INTRAVENOUS at 22:57

## 2017-07-27 RX ADMIN — HYDROMORPHONE HYDROCHLORIDE 1 MILLIGRAM(S): 2 INJECTION INTRAMUSCULAR; INTRAVENOUS; SUBCUTANEOUS at 04:41

## 2017-07-27 RX ADMIN — HEPARIN SODIUM 5000 UNIT(S): 5000 INJECTION INTRAVENOUS; SUBCUTANEOUS at 22:59

## 2017-07-27 RX ADMIN — HYDROMORPHONE HYDROCHLORIDE 1 MILLIGRAM(S): 2 INJECTION INTRAMUSCULAR; INTRAVENOUS; SUBCUTANEOUS at 12:10

## 2017-07-27 RX ADMIN — HYDROMORPHONE HYDROCHLORIDE 0.5 MILLIGRAM(S): 2 INJECTION INTRAMUSCULAR; INTRAVENOUS; SUBCUTANEOUS at 16:45

## 2017-07-27 RX ADMIN — Medication 1 EACH: at 22:59

## 2017-07-27 RX ADMIN — HYDROMORPHONE HYDROCHLORIDE 0.5 MILLIGRAM(S): 2 INJECTION INTRAMUSCULAR; INTRAVENOUS; SUBCUTANEOUS at 23:40

## 2017-07-27 RX ADMIN — LINEZOLID 300 MILLIGRAM(S): 600 INJECTION, SOLUTION INTRAVENOUS at 09:28

## 2017-07-27 RX ADMIN — HEPARIN SODIUM 5000 UNIT(S): 5000 INJECTION INTRAVENOUS; SUBCUTANEOUS at 15:09

## 2017-07-27 RX ADMIN — SODIUM CHLORIDE 1000 MILLILITER(S): 9 INJECTION, SOLUTION INTRAVENOUS at 17:45

## 2017-07-27 RX ADMIN — HYDROMORPHONE HYDROCHLORIDE 1 MILLIGRAM(S): 2 INJECTION INTRAMUSCULAR; INTRAVENOUS; SUBCUTANEOUS at 12:23

## 2017-07-27 RX ADMIN — ONDANSETRON 4 MILLIGRAM(S): 8 TABLET, FILM COATED ORAL at 23:00

## 2017-07-27 RX ADMIN — HYDROMORPHONE HYDROCHLORIDE 0.5 MILLIGRAM(S): 2 INJECTION INTRAMUSCULAR; INTRAVENOUS; SUBCUTANEOUS at 09:06

## 2017-07-27 RX ADMIN — HEPARIN SODIUM 5000 UNIT(S): 5000 INJECTION INTRAVENOUS; SUBCUTANEOUS at 06:45

## 2017-07-27 RX ADMIN — I.V. FAT EMULSION 20.83 GRAM(S): 20 EMULSION INTRAVENOUS at 22:58

## 2017-07-27 NOTE — PROGRESS NOTE ADULT - SUBJECTIVE AND OBJECTIVE BOX
INTERVAL HPI/OVERNIGHT EVENTS:    PRESSORS: [ ] YES [X ] NO  WHICH:  DOSE:    ANTIBIOTICS:                  DATE STARTED:  ANTIBIOTICS:                  DATE STARTED:  ANTIBIOTICS:                  DATE STARTED:    MEDICATIONS  (STANDING):  insulin lispro (HumaLOG) corrective regimen sliding scale   SubCutaneous Before meals and at bedtime  dextrose 5%. 1000 milliLiter(s) (50 mL/Hr) IV Continuous <Continuous>  dextrose 50% Injectable 25 Gram(s) IV Push once  sodium chloride 0.9% lock flush 20 milliLiter(s) IV Push once  linezolid  IVPB 600 milliGRAM(s) IV Intermittent every 12 hours  cyanocobalamin Injectable 1000 MICROGram(s) SubCutaneous every 7 days  fat emulsion 20% Infusion 50 Gram(s) (20.83 mL/Hr) IV Continuous <Continuous>  Parenteral Nutrition - Adult 1 Each (58 mL/Hr) TPN Continuous <Continuous>  heparin  Injectable 5000 Unit(s) SubCutaneous every 8 hours  lactated ringers. 1000 milliLiter(s) (80 mL/Hr) IV Continuous <Continuous>  dextrose 50% Injectable 12.5 Gram(s) IV Push once    MEDICATIONS  (PRN):  ondansetron Injectable 4 milliGRAM(s) IV Push every 6 hours PRN Nausea  HYDROmorphone  Injectable 0.5 milliGRAM(s) IV Push every 4 hours PRN Moderate Pain (4 - 6)  HYDROmorphone  Injectable 1 milliGRAM(s) IV Push every 4 hours PRN Severe Pain (7 - 10)  sodium chloride 0.9% lock flush 10 milliLiter(s) IV Push every 1 hour PRN After each medication administration  sodium chloride 0.9% lock flush 10 milliLiter(s) IV Push every 12 hours PRN Lumen of catheter NOT used      Drug Dosing Weight  Height (cm): 160.02 (2017 06:32)  Weight (kg): 75.773895234 (2017 07:10)  BMI (kg/m2): 29.5 (2017 07:10)  BSA (m2): 1.79 (2017 07:10)    CENTRAL LINE: [ ] YES [ ] NO  LOCATION:   DATE INSERTED:  REMOVE: [ ] YES [ ] NO  EXPLAIN:    LI: [ ] YES [ ] NO    DATE INSERTED:  REMOVE: [ ] YES [ ] NO  EXPLAIN:    A-LINE: [ ] YES [ ] NO  LOCATION:   DATE INSERTED:  REMOVE: [ ] YES [ ] NO  EXPLAIN:    PAST MEDICAL & SURGICAL HISTORY:  Reflux  Obesity  DVT (deep venous thrombosis):  neck  Diverticulitis  Hypertension  Elective surgery: abodominal wall surgery  dec 2016  Elective surgery: 2016 gastric bypass revision  Gastric bypass status for obesity: gastric sleeve, 2012  S/P breast biopsy: , left  S/P colon resection:   S/P knee surgery: repair ,   right; left  Umbilical hernia:   S/P appendectomy:   S/P tonsillectomy: 1967      REVIEW OF SYSTEMS      General:	    Skin/Breast:  	  Ophthalmologic:  	  ENMT:	    Respiratory and Thorax:  	  Cardiovascular:	    Gastrointestinal:	    Genitourinary:	    Musculoskeletal:	    Neurological:	    Psychiatric:	    Hematology/Lymphatics:	    Endocrine:	    Allergic/Immunologic:	    ICU Vital Signs Last 24 Hrs  T(C): 37 (2017 06:00), Max: 38.8 (2017 22:07)  T(F): 98.6 (2017 06:00), Max: 101.8 (2017 22:07)  HR: 90 (2017 08:00) (90 - 114)  BP: 105/66 (2017 08:00) (81/54 - 138/83)  BP(mean): 77 (2017 08:00) (62 - 116)  ABP: 90/50 (2017 08:00) (62/42 - 134/70)  ABP(mean): 64 (2017 08:00) (52 - 90)  RR: 17 (2017 08:00) (13 - 35)  SpO2: 92% (2017 08:00) (74% - 97%)      ABG - ( 2017 09:31 )  pH: 7.31  /  pCO2: 36    /  pO2: 89    / HCO3: 18    / Base Excess: -7.6  /  SaO2: 96                  I&O's Detail    2017 07:01  -  2017 07:00  --------------------------------------------------------  IN:    Fat Emulsion 20%: 230.6 mL    IV PiggyBack: 300 mL    lactated ringers.: 525 mL    lactated ringers.: 1225 mL    lactated ringers.: 960 mL    TPN (Total Parenteral Nutrition): 1246 mL  Total IN: 4486.6 mL    OUT:    Bulb: 40 mL    Indwelling Catheter - Urethral: 1475 mL  Total OUT: 1515 mL    Total NET: 2971.6 mL      2017 07:01  -  2017 09:02  --------------------------------------------------------  IN:    Fat Emulsion 20%: 21 mL    lactated ringers.: 80 mL    TPN (Total Parenteral Nutrition): 58 mL  Total IN: 159 mL    OUT:    Indwelling Catheter - Urethral: 100 mL  Total OUT: 100 mL    Total NET: 59 mL              Physical exam:    Gen: Sleepy NAD.  HEENT: PERRL, EOMI  Neuro: A/Ox4 moves all extremities.  CV: S1,S2 no MGR   Pulm: CTA BL  Abd: Soft, NT/ND, prevena wound vac in place and functioning over midline ex lap wound, right abdominal CHECO drain (right mediastinal drain) with serosanguinous output.  : Li draining yellow urine  Ext: WWP, mild peripheral edema UE  Vasc: 2+ radial and DP pulses  MSK: no joint swelling  Skin: no rash      LABS:  CBC Full  -  ( 2017 03:00 )  WBC Count : 4.6 K/uL  Hemoglobin : 8.4 g/dL  Hematocrit : 25.5 %  Platelet Count - Automated : 94 K/uL  Mean Cell Volume : 88.5 fL  Mean Cell Hemoglobin : 29.2 pg  Mean Cell Hemoglobin Concentration : 32.9 g/dL        135  |  102  |  27<H>  ----------------------------<  137<H>  3.8   |  24  |  1.00    Ca    8.7      2017 03:04  Phos  2.4       Mg     2.0             Urinalysis Basic - ( 2017 21:00 )    Color: Yellow / Appearance: Clear / S.025 / pH: x  Gluc: x / Ketone: NEGATIVE  / Bili: NEGATIVE / Urobili: 0.2 E.U./dL   Blood: x / Protein: 30 mg/dL / Nitrite: NEGATIVE   Leuk Esterase: NEGATIVE / RBC: Many /HPF / WBC 5-10 /HPF   Sq Epi: x / Non Sq Epi: Few /HPF / Bacteria: Present /HPF        RADIOLOGY & ADDITIONAL STUDIES:    CRITICAL CARE TIME SPENT: INTERVAL HPI/OVERNIGHT EVENTS:    O/N: Febrile to 101.8; given Tylenol IV. D50 given for FS in 70s.   Pt seen and examined at bedside. Endorses moderate pain when moving. Denies SOB, CP, cough, fever/ chills, nausea, vomiting. Denies BM, Flatus.    PRESSORS: [ ] YES [X ] NO  WHICH:  DOSE:    ANTIBIOTICS:      linezolid             DATE STARTED:   ANTIBIOTICS:                  DATE STARTED:  ANTIBIOTICS:                  DATE STARTED:    MEDICATIONS  (STANDING):  insulin lispro (HumaLOG) corrective regimen sliding scale   SubCutaneous Before meals and at bedtime  dextrose 5%. 1000 milliLiter(s) (50 mL/Hr) IV Continuous <Continuous>  dextrose 50% Injectable 25 Gram(s) IV Push once  sodium chloride 0.9% lock flush 20 milliLiter(s) IV Push once  linezolid  IVPB 600 milliGRAM(s) IV Intermittent every 12 hours  cyanocobalamin Injectable 1000 MICROGram(s) SubCutaneous every 7 days  fat emulsion 20% Infusion 50 Gram(s) (20.83 mL/Hr) IV Continuous <Continuous>  Parenteral Nutrition - Adult 1 Each (58 mL/Hr) TPN Continuous <Continuous>  heparin  Injectable 5000 Unit(s) SubCutaneous every 8 hours  lactated ringers. 1000 milliLiter(s) (80 mL/Hr) IV Continuous <Continuous>  dextrose 50% Injectable 12.5 Gram(s) IV Push once    MEDICATIONS  (PRN):  ondansetron Injectable 4 milliGRAM(s) IV Push every 6 hours PRN Nausea  HYDROmorphone  Injectable 0.5 milliGRAM(s) IV Push every 4 hours PRN Moderate Pain (4 - 6)  HYDROmorphone  Injectable 1 milliGRAM(s) IV Push every 4 hours PRN Severe Pain (7 - 10)  sodium chloride 0.9% lock flush 10 milliLiter(s) IV Push every 1 hour PRN After each medication administration  sodium chloride 0.9% lock flush 10 milliLiter(s) IV Push every 12 hours PRN Lumen of catheter NOT used      Drug Dosing Weight  Height (cm): 160.02 (2017 06:32)  Weight (kg): 75.775580619 (2017 07:10)  BMI (kg/m2): 29.5 (2017 07:10)  BSA (m2): 1.79 (2017 07:10)    CENTRAL LINE: [ ] YES [X ] NO  LOCATION:   DATE INSERTED:  REMOVE: [ ] YES [ ] NO  EXPLAIN:    LI: [X ] YES [ ] NO    DATE INSERTED:   REMOVE: [ ] YES [X ] NO  EXPLAIN: strict I&O    A-LINE: [X ] YES [ ] NO  LOCATION: radial  DATE INSERTED:   REMOVE: [ ] YES [X ] NO  EXPLAIN: continuos BP monitoring    PAST MEDICAL & SURGICAL HISTORY:  Reflux  Obesity  DVT (deep venous thrombosis):  neck  Diverticulitis  Hypertension  Elective surgery: abodominal wall surgery  dec 2016  Elective surgery: 2016 gastric bypass revision  Gastric bypass status for obesity: gastric sleeve, 2012  S/P breast biopsy: , left  S/P colon resection:   S/P knee surgery: repair ,   right; left  Umbilical hernia:   S/P appendectomy:   S/P tonsillectomy:       REVIEW OF SYSTEMS      Pertinent findings discussed above.      ICU Vital Signs Last 24 Hrs  T(C): 37 (2017 06:00), Max: 38.8 (2017 22:07)  T(F): 98.6 (2017 06:00), Max: 101.8 (2017 22:07)  HR: 90 (2017 08:00) (90 - 114)  BP: 105/66 (2017 08:00) (81/54 - 138/83)  BP(mean): 77 (2017 08:00) (62 - 116)  ABP: 90/50 (2017 08:00) (62/42 - 134/70)  ABP(mean): 64 (2017 08:00) (52 - 90)  RR: 17 (2017 08:00) (13 - 35)  SpO2: 92% (2017 08:00) (74% - 97%)      ABG - ( 2017 09:31 )  pH: 7.31  /  pCO2: 36    /  pO2: 89    / HCO3: 18    / Base Excess: -7.6  /  SaO2: 96                  I&O's Detail    2017 07:01  -  2017 07:00  --------------------------------------------------------  IN:    Fat Emulsion 20%: 230.6 mL    IV PiggyBack: 300 mL    lactated ringers.: 525 mL    lactated ringers.: 1225 mL    lactated ringers.: 960 mL    TPN (Total Parenteral Nutrition): 1246 mL  Total IN: 4486.6 mL    OUT:    Bulb: 40 mL    Indwelling Catheter - Urethral: 1475 mL  Total OUT: 1515 mL    Total NET: 2971.6 mL      2017 07:01  -  2017 09:02  --------------------------------------------------------  IN:    Fat Emulsion 20%: 21 mL    lactated ringers.: 80 mL    TPN (Total Parenteral Nutrition): 58 mL  Total IN: 159 mL    OUT:    Indwelling Catheter - Urethral: 100 mL  Total OUT: 100 mL    Total NET: 59 mL              Physical exam:    Gen: Sleepy NAD.  HEENT: PERRL, EOMI  Neuro: A/Ox4 moves all extremities.  CV: S1,S2 no MGR   Pulm: CTA BL  Abd: Soft, NT/ND, prevena wound vac in place and functioning over midline ex lap wound, right abdominal CHECO drain (right mediastinal drain) with serosanguinous output.  : Li draining yellow urine  Ext: WWP, mild peripheral edema UE  Vasc: 2+ radial and DP pulses  MSK: no joint swelling  Skin: no rash      LABS:  CBC Full  -  ( 2017 03:00 )  WBC Count : 4.6 K/uL  Hemoglobin : 8.4 g/dL  Hematocrit : 25.5 %  Platelet Count - Automated : 94 K/uL  Mean Cell Volume : 88.5 fL  Mean Cell Hemoglobin : 29.2 pg  Mean Cell Hemoglobin Concentration : 32.9 g/dL        135  |  102  |  27<H>  ----------------------------<  137<H>  3.8   |  24  |  1.00    Ca    8.7      2017 03:04  Phos  2.4       Mg     2.0             Urinalysis Basic - ( 2017 21:00 )    Color: Yellow / Appearance: Clear / S.025 / pH: x  Gluc: x / Ketone: NEGATIVE  / Bili: NEGATIVE / Urobili: 0.2 E.U./dL   Blood: x / Protein: 30 mg/dL / Nitrite: NEGATIVE   Leuk Esterase: NEGATIVE / RBC: Many /HPF / WBC 5-10 /HPF   Sq Epi: x / Non Sq Epi: Few /HPF / Bacteria: Present /HPF        RADIOLOGY & ADDITIONAL STUDIES:    CRITICAL CARE TIME SPENT:

## 2017-07-27 NOTE — PROGRESS NOTE ADULT - ASSESSMENT
57 F s/p  SBR resection, fistula resection, esophagojejunostomy revision admitted to sicu for hemodynamic monitoring with intermittent hypotension and likely ATN.    Neuro: dilaudid prn   CV: Currently not requiring any pressors, maintain  MAPs of 65  Resp: Satting well on NC  GI/FEN:NJT NPO/LR@80, TPN Vit B12   : Aimee  ID: linezolid (7/25--) ///dc: Perioperative Gent/Vanc  Heme/PPX: SCDs for now,  SQH  Lines: [place] (7/24--), PIVs  Drains: Right mediastinal CHECO in Right abdomen  Wounds: Midline xiphoid to pubis with prevena vac in place 57 F s/p  SBR resection, fistula resection, esophagojejunostomy revision admitted to sicu for hemodynamic monitoring with intermittent hypotension and likely ATN.    Neuro: dilaudid prn   CV: Currently not requiring any pressors, maintain  MAPs of 65  Resp: Satting well on NC  GI/FEN:NJT NPO, TPN Vit B12   : Yu  ID: linezolid (7/25--) ///dc: Perioperative Gent/Vanc  Heme/PPX: SCDs for now,  SQH  Lines: Radha (7/24--), PIVs  Drains: Right mediastinal CHECO in Right abdomen  Wounds: Midline xiphoid to pubis with prevena vac in place 57 F s/p  SBR resection, fistula resection, esophagojejunostomy revision admitted to sicu for hemodynamic monitoring with intermittent hypotension and likely ATN.    Neuro: dilaudid prn   CV: Currently not requiring any pressors, maintain  MAPs of 65. DC IVF except TPN  Resp: Satting well on NC  GI/FEN:NJT NPO, TPN Vit B12   : Yu; renal function continues to improve and UO if good  ID: linezolid (7/25--) ///dc: Perioperative Gent/Vanc  Heme/PPX: SCDs for now,  SQH  Lines: Radha (7/24--), PIVs  Drains: Right mediastinal CHECO in Right abdomen  Wounds: Midline xiphoid to pubis with prevena vac in place

## 2017-07-28 LAB
ALBUMIN SERPL ELPH-MCNC: 1.4 G/DL — LOW (ref 3.3–5)
ANION GAP SERPL CALC-SCNC: 9 MMOL/L — SIGNIFICANT CHANGE UP (ref 5–17)
APPEARANCE UR: (no result)
BASE EXCESS BLDA CALC-SCNC: 4.3 MMOL/L — HIGH (ref -2–3)
BILIRUB UR-MCNC: NEGATIVE — SIGNIFICANT CHANGE UP
BLD GP AB SCN SERPL QL: NEGATIVE — SIGNIFICANT CHANGE UP
BUN SERPL-MCNC: 39 MG/DL — HIGH (ref 7–23)
CALCIUM SERPL-MCNC: 8.6 MG/DL — SIGNIFICANT CHANGE UP (ref 8.4–10.5)
CHLORIDE SERPL-SCNC: 102 MMOL/L — SIGNIFICANT CHANGE UP (ref 96–108)
CO2 SERPL-SCNC: 27 MMOL/L — SIGNIFICANT CHANGE UP (ref 22–31)
COLOR SPEC: YELLOW — SIGNIFICANT CHANGE UP
CREAT SERPL-MCNC: 1.2 MG/DL — SIGNIFICANT CHANGE UP (ref 0.5–1.3)
DIFF PNL FLD: (no result)
GAS PNL BLDA: SIGNIFICANT CHANGE UP
GLUCOSE SERPL-MCNC: 141 MG/DL — HIGH (ref 70–99)
GLUCOSE UR QL: NEGATIVE — SIGNIFICANT CHANGE UP
HCO3 BLDA-SCNC: 29 MMOL/L — HIGH (ref 21–28)
HCT VFR BLD CALC: 21.2 % — LOW (ref 34.5–45)
HCT VFR BLD CALC: 23.8 % — LOW (ref 34.5–45)
HGB BLD-MCNC: 6.9 G/DL — CRITICAL LOW (ref 11.5–15.5)
HGB BLD-MCNC: 7.6 G/DL — LOW (ref 11.5–15.5)
KETONES UR-MCNC: NEGATIVE — SIGNIFICANT CHANGE UP
LACTATE SERPL-SCNC: 1.4 MMOL/L — SIGNIFICANT CHANGE UP (ref 0.5–2)
LEUKOCYTE ESTERASE UR-ACNC: NEGATIVE — SIGNIFICANT CHANGE UP
MAGNESIUM SERPL-MCNC: 2.8 MG/DL — HIGH (ref 1.6–2.6)
MCHC RBC-ENTMCNC: 28.6 PG — SIGNIFICANT CHANGE UP (ref 27–34)
MCHC RBC-ENTMCNC: 29 PG — SIGNIFICANT CHANGE UP (ref 27–34)
MCHC RBC-ENTMCNC: 31.9 G/DL — LOW (ref 32–36)
MCHC RBC-ENTMCNC: 32.5 G/DL — SIGNIFICANT CHANGE UP (ref 32–36)
MCV RBC AUTO: 89.1 FL — SIGNIFICANT CHANGE UP (ref 80–100)
MCV RBC AUTO: 89.5 FL — SIGNIFICANT CHANGE UP (ref 80–100)
NITRITE UR-MCNC: NEGATIVE — SIGNIFICANT CHANGE UP
PCO2 BLDA: 42 MMHG — SIGNIFICANT CHANGE UP (ref 32–45)
PH BLDA: 7.45 — SIGNIFICANT CHANGE UP (ref 7.35–7.45)
PH UR: 5 — SIGNIFICANT CHANGE UP (ref 5–8)
PHOSPHATE SERPL-MCNC: 3.4 MG/DL — SIGNIFICANT CHANGE UP (ref 2.5–4.5)
PLATELET # BLD AUTO: 107 K/UL — LOW (ref 150–400)
PLATELET # BLD AUTO: 91 K/UL — LOW (ref 150–400)
PO2 BLDA: 72 MMHG — LOW (ref 83–108)
POTASSIUM SERPL-MCNC: 3.7 MMOL/L — SIGNIFICANT CHANGE UP (ref 3.5–5.3)
POTASSIUM SERPL-SCNC: 3.7 MMOL/L — SIGNIFICANT CHANGE UP (ref 3.5–5.3)
PROT UR-MCNC: (no result) MG/DL
RBC # BLD: 2.38 M/UL — LOW (ref 3.8–5.2)
RBC # BLD: 2.66 M/UL — LOW (ref 3.8–5.2)
RBC # FLD: 15.7 % — SIGNIFICANT CHANGE UP (ref 10.3–16.9)
RBC # FLD: 15.9 % — SIGNIFICANT CHANGE UP (ref 10.3–16.9)
RH IG SCN BLD-IMP: POSITIVE — SIGNIFICANT CHANGE UP
SAO2 % BLDA: 96 % — SIGNIFICANT CHANGE UP (ref 95–100)
SODIUM SERPL-SCNC: 138 MMOL/L — SIGNIFICANT CHANGE UP (ref 135–145)
SP GR SPEC: 1.01 — SIGNIFICANT CHANGE UP (ref 1–1.03)
UROBILINOGEN FLD QL: 0.2 E.U./DL — SIGNIFICANT CHANGE UP
WBC # BLD: 3.5 K/UL — LOW (ref 3.8–10.5)
WBC # BLD: 5.2 K/UL — SIGNIFICANT CHANGE UP (ref 3.8–10.5)
WBC # FLD AUTO: 3.5 K/UL — LOW (ref 3.8–10.5)
WBC # FLD AUTO: 5.2 K/UL — SIGNIFICANT CHANGE UP (ref 3.8–10.5)

## 2017-07-28 PROCEDURE — 99233 SBSQ HOSP IP/OBS HIGH 50: CPT | Mod: GC

## 2017-07-28 PROCEDURE — 71010: CPT | Mod: 26,76

## 2017-07-28 PROCEDURE — 93970 EXTREMITY STUDY: CPT | Mod: 26

## 2017-07-28 RX ORDER — HEPARIN SODIUM 5000 [USP'U]/ML
7500 INJECTION INTRAVENOUS; SUBCUTANEOUS EVERY 8 HOURS
Qty: 0 | Refills: 0 | Status: DISCONTINUED | OUTPATIENT
Start: 2017-07-28 | End: 2017-08-11

## 2017-07-28 RX ORDER — POTASSIUM CHLORIDE 20 MEQ
10 PACKET (EA) ORAL
Qty: 0 | Refills: 0 | Status: COMPLETED | OUTPATIENT
Start: 2017-07-28 | End: 2017-07-28

## 2017-07-28 RX ORDER — ACETAMINOPHEN 500 MG
1000 TABLET ORAL ONCE
Qty: 0 | Refills: 0 | Status: COMPLETED | OUTPATIENT
Start: 2017-07-28 | End: 2017-07-28

## 2017-07-28 RX ORDER — KETOROLAC TROMETHAMINE 30 MG/ML
15 SYRINGE (ML) INJECTION EVERY 6 HOURS
Qty: 0 | Refills: 0 | Status: DISCONTINUED | OUTPATIENT
Start: 2017-07-28 | End: 2017-07-28

## 2017-07-28 RX ORDER — ALBUMIN HUMAN 25 %
250 VIAL (ML) INTRAVENOUS ONCE
Qty: 0 | Refills: 0 | Status: COMPLETED | OUTPATIENT
Start: 2017-07-28 | End: 2017-07-28

## 2017-07-28 RX ORDER — HEPARIN SODIUM 5000 [USP'U]/ML
7500 INJECTION INTRAVENOUS; SUBCUTANEOUS EVERY 8 HOURS
Qty: 0 | Refills: 0 | Status: DISCONTINUED | OUTPATIENT
Start: 2017-07-28 | End: 2017-07-28

## 2017-07-28 RX ORDER — SODIUM CHLORIDE 9 MG/ML
500 INJECTION INTRAMUSCULAR; INTRAVENOUS; SUBCUTANEOUS ONCE
Qty: 0 | Refills: 0 | Status: COMPLETED | OUTPATIENT
Start: 2017-07-28 | End: 2017-07-28

## 2017-07-28 RX ORDER — LINEZOLID 600 MG/300ML
INJECTION, SOLUTION INTRAVENOUS
Qty: 0 | Refills: 0 | Status: DISCONTINUED | OUTPATIENT
Start: 2017-07-28 | End: 2017-08-01

## 2017-07-28 RX ORDER — I.V. FAT EMULSION 20 G/100ML
50 EMULSION INTRAVENOUS
Qty: 0 | Refills: 0 | Status: DISCONTINUED | OUTPATIENT
Start: 2017-07-28 | End: 2017-07-29

## 2017-07-28 RX ORDER — ELECTROLYTE SOLUTION,INJ
1 VIAL (ML) INTRAVENOUS
Qty: 0 | Refills: 0 | Status: DISCONTINUED | OUTPATIENT
Start: 2017-07-28 | End: 2017-07-28

## 2017-07-28 RX ORDER — HEPARIN SODIUM 5000 [USP'U]/ML
600 INJECTION INTRAVENOUS; SUBCUTANEOUS
Qty: 25000 | Refills: 0 | Status: DISCONTINUED | OUTPATIENT
Start: 2017-07-28 | End: 2017-07-28

## 2017-07-28 RX ORDER — FUROSEMIDE 40 MG
20 TABLET ORAL ONCE
Qty: 0 | Refills: 0 | Status: DISCONTINUED | OUTPATIENT
Start: 2017-07-28 | End: 2017-07-28

## 2017-07-28 RX ORDER — FUROSEMIDE 40 MG
20 TABLET ORAL ONCE
Qty: 0 | Refills: 0 | Status: COMPLETED | OUTPATIENT
Start: 2017-07-28 | End: 2017-07-28

## 2017-07-28 RX ORDER — ELECTROLYTE SOLUTION,INJ
1 VIAL (ML) INTRAVENOUS
Qty: 0 | Refills: 0 | Status: DISCONTINUED | OUTPATIENT
Start: 2017-07-28 | End: 2017-07-29

## 2017-07-28 RX ADMIN — Medication 15 MILLIGRAM(S): at 10:00

## 2017-07-28 RX ADMIN — HYDROMORPHONE HYDROCHLORIDE 0.5 MILLIGRAM(S): 2 INJECTION INTRAMUSCULAR; INTRAVENOUS; SUBCUTANEOUS at 00:10

## 2017-07-28 RX ADMIN — Medication 1 EACH: at 19:39

## 2017-07-28 RX ADMIN — Medication 1000 MILLIGRAM(S): at 20:30

## 2017-07-28 RX ADMIN — Medication 20 MILLIGRAM(S): at 10:03

## 2017-07-28 RX ADMIN — Medication 15 MILLIGRAM(S): at 10:45

## 2017-07-28 RX ADMIN — Medication 400 MILLIGRAM(S): at 19:58

## 2017-07-28 RX ADMIN — Medication 250 MILLILITER(S): at 22:48

## 2017-07-28 RX ADMIN — HYDROMORPHONE HYDROCHLORIDE 0.5 MILLIGRAM(S): 2 INJECTION INTRAMUSCULAR; INTRAVENOUS; SUBCUTANEOUS at 06:45

## 2017-07-28 RX ADMIN — Medication 2: at 08:54

## 2017-07-28 RX ADMIN — Medication 1000 MILLIGRAM(S): at 13:41

## 2017-07-28 RX ADMIN — HEPARIN SODIUM 5000 UNIT(S): 5000 INJECTION INTRAVENOUS; SUBCUTANEOUS at 06:14

## 2017-07-28 RX ADMIN — Medication 100 MILLIEQUIVALENT(S): at 14:59

## 2017-07-28 RX ADMIN — HEPARIN SODIUM 5000 UNIT(S): 5000 INJECTION INTRAVENOUS; SUBCUTANEOUS at 14:56

## 2017-07-28 RX ADMIN — SODIUM CHLORIDE 1000 MILLILITER(S): 9 INJECTION INTRAMUSCULAR; INTRAVENOUS; SUBCUTANEOUS at 22:40

## 2017-07-28 RX ADMIN — Medication 400 MILLIGRAM(S): at 02:20

## 2017-07-28 RX ADMIN — Medication 15 MILLIGRAM(S): at 16:11

## 2017-07-28 RX ADMIN — Medication 15 MILLIGRAM(S): at 16:41

## 2017-07-28 RX ADMIN — LINEZOLID 300 MILLIGRAM(S): 600 INJECTION, SOLUTION INTRAVENOUS at 10:03

## 2017-07-28 RX ADMIN — Medication 100 MILLIEQUIVALENT(S): at 16:12

## 2017-07-28 RX ADMIN — Medication 2: at 13:10

## 2017-07-28 RX ADMIN — HYDROMORPHONE HYDROCHLORIDE 0.5 MILLIGRAM(S): 2 INJECTION INTRAMUSCULAR; INTRAVENOUS; SUBCUTANEOUS at 06:14

## 2017-07-28 RX ADMIN — Medication 400 MILLIGRAM(S): at 13:11

## 2017-07-28 NOTE — CHART NOTE - NSCHARTNOTEFT_GEN_A_CORE
PGY-3 Note    58 yo F with hx of MO s/p gastric bypass '02 c/b stricture, corkscrewed sleeve and chronic leak s/p revision 11/16 with protracted postop course presented for enterocutaneous fistula s/p resection of fistula and revision of esophagoJ on 7/24, admitted to SICU post-op  and stopped down yesterday. Patient well known to writer and service.   Patient's baseline deconditioning evidenced post-op however has been getting up and out of bed at least twice daily while in the ICU.   Patient this morning found to be slightly somnolent although still AAOx3. All narcotics were held and patient's mental status waxes and wanes. When examined this afternoon is still AAOx3 and able to get out of chair making appropriate requests and jokes. When bedside HR in 80s, SBP 100s, Sat 96 and breathing comfortable on NC. Patient grossly edematous throughout, no unilateral swelling. Abdomen soft and appropriate. Not passing gas/BM. NGT was removed this morning. ABG obtained.  ABG - ( 28 Jul 2017 16:14 )  pH: 7.45  /  pCO2: 42    /  pO2: 72    / HCO3: 29    / Base Excess: 4.3   /  SaO2: 96  on nasal canula  Patient moved to chair by surgical team on afternoon rounds and tolerated well. Plan to give unit of blood as hgb is slowly downtrending-- large component likely dilutional. Will give lasix after blood and watch potassium. Very low suspicion for PE as is not tachycardic, short of breath and sats in mid to high 90s. This plan discussed with attending. Will not get CT PE protocol as concern for kidney function and do not want to hydrate with crystalloid in light of overall status. Per the suggestion of CTS team, will obtain ICU consult to make sure care is appropriate for patient on telemetry. PT ordered.

## 2017-07-28 NOTE — PROGRESS NOTE ADULT - ASSESSMENT
57 F s/p  SBR resection, fistula resection, esophagojejunostomy revision w/ ATN.    Neuro: dilaudid prn   CV: maintain  MAPs of 65  Resp: Satting well on NC  GI/FEN: NJT NPO, TPN Vit B12   : Yu  ID: linezolid (7/25--) ///dc: Perioperative Gent/Vanc  Heme/PPX: SCDs for now, SQH  Lines: Radha (7/24--), PIVs  Drains: Right mediastinal CHECO in Right abdomen  Wounds: Midline xiphoid to pubis with prevena vac in place 57 F s/p  SBR resection, fistula resection, esophagojejunostomy revision w/ ATN.    Neuro: No narcotics, toradol   CV: maintain  MAPs of 65  Resp: Satting well on NC  GI/FEN: NPO, TPN Vit B12   : Yu  ID: linezolid (7/25-7/29) ///dc: Perioperative Gent/Vanc  Heme/PPX: SCDs for now, SQH, 1 PRBC over 3 hrs  Lines: Radha (7/24--), PIVs  Drains: Right mediastinal CHECO in Right abdomen  Wounds: Midline xiphoid to pubis with prevena vac in place

## 2017-07-28 NOTE — PROGRESS NOTE ADULT - SUBJECTIVE AND OBJECTIVE BOX
s/p resection of fistula and esophago jejnostomy   ng advanced this am  xrays compared and seems like in candy cane  afeb tmax 101  slurrying words  will reduce narcotics  no heart rate increase  removed ng tube will keep npo  takes shallow respirations  will dc narcotics and use toradol as needed  keep npo  give lasix  stop antibiotics over weekend

## 2017-07-28 NOTE — PROGRESS NOTE ADULT - SUBJECTIVE AND OBJECTIVE BOX
O/N: Tmax 101.8, CXR done cannot r/o PNA, vitals otherwise normal for pt  7/27: OOB to chair, poor IS effort, reordered TPN (with dextrose at goal), d/c'ed MIVF. sbp down to 80 oob to chair given bolus placed back in chair and bp improved to 100's    STATUS POST: SBR resection, fistula resection, esophagojejunostomy revision    POD# 4 SUBJECTIVE: Patient seen and examined bedside by chief resident. Febrile overnight to 101.8F. CXR WNL. NGT removed. Denies n/v/CP/dyspnea/abd pain.     Vital Signs Last 24 Hrs  T(C): 36.2 (2017 17:22), Max: 38.8 (2017 01:12)  T(F): 97.2 (2017 17:22), Max: 101.8 (2017 01:12)  HR: 84 (2017 16:50) (70 - 118)  BP: 102/58 (2017 16:50) (90/52 - 133/76)  BP(mean): 73 (2017 16:50) (65 - 99)  RR: 20 (2017 16:50) (16 - 26)  SpO2: 96% (2017 16:50) (92% - 97%)  I&O's Detail    2017 07:01  -  2017 07:00  --------------------------------------------------------  IN:    Fat Emulsion 20%: 84 mL    Lactated Ringers IV Bolus: 500 mL    lactated ringers.: 80 mL    Solution: 250 mL    TPN (Total Parenteral Nutrition): 696 mL  Total IN: 1610 mL    OUT:    Bulb: 30 mL    Indwelling Catheter - Urethral: 1025 mL  Total OUT: 1055 mL    Total NET: 555 mL      2017 07:01  -  2017 17:41  --------------------------------------------------------  IN:    Fat Emulsion 20%: 62.4 mL    TPN (Total Parenteral Nutrition): 567 mL  Total IN: 629.4 mL    OUT:    Bulb: 20 mL    Indwelling Catheter - Urethral: 380 mL  Total OUT: 400 mL    Total NET: 229.4 mL          General: NAD, AOx3, slow  C/V: NSR  Pulm: Nonlabored breathing on nasal cannula, no respiratory distress  Abd: soft, NT, mildly distended  Extrem: WWP, no edema, SCDs in place        LABS:                        7.6    5.2   )-----------( 107      ( 2017 08:17 )             23.8         138  |  102  |  39<H>  ----------------------------<  141<H>  3.7   |  27  |  1.20    Ca    8.6      2017 08:17  Phos  3.4       Mg     2.8             Urinalysis Basic - ( 2017 13:02 )    Color: Yellow / Appearance: Cloudy / S.015 / pH: x  Gluc: x / Ketone: NEGATIVE  / Bili: NEGATIVE / Urobili: 0.2 E.U./dL   Blood: x / Protein: Trace mg/dL / Nitrite: NEGATIVE   Leuk Esterase: NEGATIVE / RBC: < 5 /HPF / WBC 5-10 /HPF   Sq Epi: x / Non Sq Epi: Few /HPF / Bacteria: Present /HPF        O/N: Tmax 101.8, CXR done cannot r/o PNA, vitals otherwise normal for pt  : OOB to chair, poor IS effort, reordered TPN (with dextrose at goal), d/c'ed MIVF. sbp down to 80 oob to chair given bolus placed back in chair and bp improved to 100's    STATUS POST: SBR resection, fistula resection, esophagojejunostomy revision    POD# 4

## 2017-07-28 NOTE — CHART NOTE - NSCHARTNOTEFT_GEN_A_CORE
Patient seen this morning.  He had received Dilaudid a few minutes before I saw her, so she was a little drowsy, but alert and oriented.  Denied pain, SOB, chest pain. States that she walks with PT, last time she ambulated was yesterday.  She should walk again today as soon as she perks up from her pain medication.  Plan is to keep her NPO through the weekend, and obtain an X-ray esophagram on Monday.  Discussed with Dr. Baird. Will follow with you.

## 2017-07-28 NOTE — CHART NOTE - NSCHARTNOTEFT_GEN_A_CORE
57yoF s/p revision of esophagojej with resection of EC fistula on 7/24 stepped down from ICU level care yesterday. Pt was found to be somnolent this afternoon and SICU was consulted to evaluate. Pt was seen by SICU team and attending on afternoon rounds. Pt offered no complaints at time of evaluation. She denies SOB, CP, N/V, abdominal pain.     ICU Vital Signs Last 24 Hrs  T(C): 36.2 (28 Jul 2017 17:22), Max: 38.8 (28 Jul 2017 01:12)  T(F): 97.2 (28 Jul 2017 17:22), Max: 101.8 (28 Jul 2017 01:12)  HR: 86 (28 Jul 2017 19:44) (84 - 118)  BP: 93/59 (28 Jul 2017 19:44) (85/56 - 116/66)  BP(mean): 71 (28 Jul 2017 19:44) (65 - 82)  ABP: --  ABP(mean): --  RR: 17 (28 Jul 2017 19:44) (16 - 20)  SpO2: 96% (28 Jul 2017 19:44) (92% - 97%)    Exam:  Gen: Resting comfortably in bed, in NAD  Neuro: lethargic but easily arousable to voice. A&Ox3. CN II-XII grossly intact.  HEENT: dry mucous membranes. No scleral icterus. No JVD.   CV: RRR, no murmur  Pulm: decreased BS at b/l bases. Lungs clear. No accessory muscle use or nasal flaring  Abd: soft, NTND, +BS. No guarding, rebound, or rigidity. Incisions CDI. CHECO with scant SS output.   : Yu to gravity draining clear yellow urine  Ext: WWP, no edema/cyanosis/clubbing    A/P: 57yoF s/p revision of esophagojej with resection of EC fistula. Pt is currently POD#4 and HD stable without s/sxs of distress however with transfer to SICU for close HD monitoring and observation as per primary attending and CT surgery.  Neuro: tylenol prn. Avoid narcotics in setting of somnolence. Will discontinue toradol 2/2 rising BUN.   CV: HD stable.   Pulm: RA. Chest percussion therapy q4hrs to increase oxygenation.  FENGI: STRICT NPO through weekend. TPN/lipids. B-12  : Yu to gravity  Endo: ISS  ID: c/w linezolid for coverage of MDR orgs on previous admission. OR cultures pending   Heme: SQH. Will discontinue heparin drip as there is little clinical concern for PE at this time. LE doppler pending.   Lines: PICC  Wounds: midline incision with prevena vac    Above plan discussed with primary team, SICU team, and patient. 57yoF s/p revision of esophagojej with resection of EC fistula on 7/24 stepped down from ICU level care yesterday. Pt was found to be somnolent this afternoon and SICU was consulted to evaluate. Pt was seen by SICU team and attending on afternoon rounds. Pt offered no complaints at time of evaluation. She denies SOB, CP, N/V, abdominal pain.     ICU Vital Signs Last 24 Hrs  T(C): 36.2 (28 Jul 2017 17:22), Max: 38.8 (28 Jul 2017 01:12)  T(F): 97.2 (28 Jul 2017 17:22), Max: 101.8 (28 Jul 2017 01:12)  HR: 86 (28 Jul 2017 19:44) (84 - 118)  BP: 93/59 (28 Jul 2017 19:44) (85/56 - 116/66)  BP(mean): 71 (28 Jul 2017 19:44) (65 - 82)  ABP: --  ABP(mean): --  RR: 17 (28 Jul 2017 19:44) (16 - 20)  SpO2: 96% (28 Jul 2017 19:44) (92% - 97%)    Exam:  Gen: Resting comfortably in bed, in NAD  Neuro: lethargic but easily arousable to voice. A&Ox3. CN II-XII grossly intact.  HEENT: PERRL, EOMI, dry mucous membranes. No scleral icterus. No JVD.   CV: RRR, no murmur  Pulm: decreased BS at b/l bases. Lungs clear. No accessory muscle use or nasal flaring  Abd: soft, NTND, +BS. No guarding, rebound, or rigidity. Incisions CDI. CHECO with scant SS output.   : Yu to gravity draining clear yellow urine  Ext: WWP, no edema/cyanosis/clubbing  Vasc: 2+ radial and DP pulses  MSK: no joint swelling  Skin: no rash    A/P: 57yoF s/p revision of esophagojej with resection of EC fistula. Pt is currently POD#4 and HD stable without s/sxs of distress however with transfer to SICU for close HD monitoring and observation as per primary attending and CT surgery.  Neuro: tylenol prn. Avoid narcotics in setting of somnolence. Will discontinue toradol 2/2 rising BUN with possible ATN continuing.   CV: HD stable.   Pulm: RA. Chest percussion and rotational therapy q4hrs to increase oxygenation. Encourage mobility.  FENGI: STRICT NPO through weekend. TPN/lipids. B-12  : Yu to gravity, follow BUN and creatinine. DC ketorolac.  Endo: ISS  ID: c/w linezolid for coverage of MDR orgs on previous admission. OR cultures pending   Heme: SQH. Will discontinue heparin drip as there is little clinical concern for PE at this time. LE doppler pending.   Lines: PICC  Wounds: midline incision with prevena vac  DVT prophylaxis with SCD/lovenox  Pain control with IV tylenol for now    Above plan discussed with primary team, SICU team, and patient.  Reviewed and edited with DANNI Hairston acting as my scribe.

## 2017-07-29 DIAGNOSIS — I82.409 ACUTE EMBOLISM AND THROMBOSIS OF UNSPECIFIED DEEP VEINS OF UNSPECIFIED LOWER EXTREMITY: ICD-10-CM

## 2017-07-29 DIAGNOSIS — I10 ESSENTIAL (PRIMARY) HYPERTENSION: ICD-10-CM

## 2017-07-29 DIAGNOSIS — A41.9 SEPSIS, UNSPECIFIED ORGANISM: ICD-10-CM

## 2017-07-29 DIAGNOSIS — E66.9 OBESITY, UNSPECIFIED: ICD-10-CM

## 2017-07-29 LAB
ANION GAP SERPL CALC-SCNC: 11 MMOL/L — SIGNIFICANT CHANGE UP (ref 5–17)
ANION GAP SERPL CALC-SCNC: 12 MMOL/L — SIGNIFICANT CHANGE UP (ref 5–17)
APTT BLD: 34.3 SEC — SIGNIFICANT CHANGE UP (ref 27.5–37.4)
BUN SERPL-MCNC: 44 MG/DL — HIGH (ref 7–23)
BUN SERPL-MCNC: 45 MG/DL — HIGH (ref 7–23)
CALCIUM SERPL-MCNC: 8.2 MG/DL — LOW (ref 8.4–10.5)
CALCIUM SERPL-MCNC: 8.4 MG/DL — SIGNIFICANT CHANGE UP (ref 8.4–10.5)
CHLORIDE SERPL-SCNC: 102 MMOL/L — SIGNIFICANT CHANGE UP (ref 96–108)
CHLORIDE SERPL-SCNC: 102 MMOL/L — SIGNIFICANT CHANGE UP (ref 96–108)
CO2 SERPL-SCNC: 25 MMOL/L — SIGNIFICANT CHANGE UP (ref 22–31)
CO2 SERPL-SCNC: 26 MMOL/L — SIGNIFICANT CHANGE UP (ref 22–31)
CREAT SERPL-MCNC: 1 MG/DL — SIGNIFICANT CHANGE UP (ref 0.5–1.3)
CREAT SERPL-MCNC: 1.1 MG/DL — SIGNIFICANT CHANGE UP (ref 0.5–1.3)
GLUCOSE SERPL-MCNC: 159 MG/DL — HIGH (ref 70–99)
GLUCOSE SERPL-MCNC: 95 MG/DL — SIGNIFICANT CHANGE UP (ref 70–99)
HCT VFR BLD CALC: 25.5 % — LOW (ref 34.5–45)
HCT VFR BLD CALC: 29.6 % — LOW (ref 34.5–45)
HGB BLD-MCNC: 9.1 G/DL — LOW (ref 11.5–15.5)
HGB BLD-MCNC: 9.7 G/DL — LOW (ref 11.5–15.5)
INR BLD: 1.1 — SIGNIFICANT CHANGE UP (ref 0.88–1.16)
MAGNESIUM SERPL-MCNC: 2.7 MG/DL — HIGH (ref 1.6–2.6)
MCHC RBC-ENTMCNC: 28.7 PG — SIGNIFICANT CHANGE UP (ref 27–34)
MCHC RBC-ENTMCNC: 30.6 PG — SIGNIFICANT CHANGE UP (ref 27–34)
MCHC RBC-ENTMCNC: 32.8 G/DL — SIGNIFICANT CHANGE UP (ref 32–36)
MCHC RBC-ENTMCNC: 35.7 G/DL — SIGNIFICANT CHANGE UP (ref 32–36)
MCV RBC AUTO: 85.9 FL — SIGNIFICANT CHANGE UP (ref 80–100)
MCV RBC AUTO: 87.6 FL — SIGNIFICANT CHANGE UP (ref 80–100)
PHOSPHATE SERPL-MCNC: 4 MG/DL — SIGNIFICANT CHANGE UP (ref 2.5–4.5)
PLATELET # BLD AUTO: 104 K/UL — LOW (ref 150–400)
PLATELET # BLD AUTO: 95 K/UL — LOW (ref 150–400)
POTASSIUM SERPL-MCNC: 3.8 MMOL/L — SIGNIFICANT CHANGE UP (ref 3.5–5.3)
POTASSIUM SERPL-MCNC: 3.9 MMOL/L — SIGNIFICANT CHANGE UP (ref 3.5–5.3)
POTASSIUM SERPL-SCNC: 3.8 MMOL/L — SIGNIFICANT CHANGE UP (ref 3.5–5.3)
POTASSIUM SERPL-SCNC: 3.9 MMOL/L — SIGNIFICANT CHANGE UP (ref 3.5–5.3)
PROTHROM AB SERPL-ACNC: 12.2 SEC — SIGNIFICANT CHANGE UP (ref 9.8–12.7)
RBC # BLD: 2.97 M/UL — LOW (ref 3.8–5.2)
RBC # BLD: 3.38 M/UL — LOW (ref 3.8–5.2)
RBC # FLD: 15.4 % — SIGNIFICANT CHANGE UP (ref 10.3–16.9)
RBC # FLD: 16.7 % — SIGNIFICANT CHANGE UP (ref 10.3–16.9)
SODIUM SERPL-SCNC: 139 MMOL/L — SIGNIFICANT CHANGE UP (ref 135–145)
SODIUM SERPL-SCNC: 139 MMOL/L — SIGNIFICANT CHANGE UP (ref 135–145)
WBC # BLD: 4.4 K/UL — SIGNIFICANT CHANGE UP (ref 3.8–10.5)
WBC # BLD: 5 K/UL — SIGNIFICANT CHANGE UP (ref 3.8–10.5)
WBC # FLD AUTO: 4.4 K/UL — SIGNIFICANT CHANGE UP (ref 3.8–10.5)
WBC # FLD AUTO: 5 K/UL — SIGNIFICANT CHANGE UP (ref 3.8–10.5)

## 2017-07-29 PROCEDURE — 74176 CT ABD & PELVIS W/O CONTRAST: CPT | Mod: 26

## 2017-07-29 PROCEDURE — 99233 SBSQ HOSP IP/OBS HIGH 50: CPT | Mod: GC

## 2017-07-29 PROCEDURE — 71010: CPT | Mod: 26

## 2017-07-29 PROCEDURE — 49406 IMAGE CATH FLUID PERI/RETRO: CPT

## 2017-07-29 PROCEDURE — 49000 EXPLORATION OF ABDOMEN: CPT | Mod: 78

## 2017-07-29 RX ORDER — COLISTIMETHATE SODIUM 150 MG/2ML
150 INJECTION INTRAMUSCULAR; INTRAVENOUS EVERY 12 HOURS
Qty: 0 | Refills: 0 | Status: DISCONTINUED | OUTPATIENT
Start: 2017-07-29 | End: 2017-07-29

## 2017-07-29 RX ORDER — COLISTIMETHATE SODIUM 150 MG/2ML
150 INJECTION INTRAMUSCULAR; INTRAVENOUS ONCE
Qty: 0 | Refills: 0 | Status: DISCONTINUED | OUTPATIENT
Start: 2017-07-29 | End: 2017-07-29

## 2017-07-29 RX ORDER — COLISTIMETHATE SODIUM 150 MG/2ML
150 INJECTION INTRAMUSCULAR; INTRAVENOUS EVERY 12 HOURS
Qty: 0 | Refills: 0 | Status: DISCONTINUED | OUTPATIENT
Start: 2017-07-29 | End: 2017-08-01

## 2017-07-29 RX ORDER — IPRATROPIUM/ALBUTEROL SULFATE 18-103MCG
3 AEROSOL WITH ADAPTER (GRAM) INHALATION ONCE
Qty: 0 | Refills: 0 | Status: COMPLETED | OUTPATIENT
Start: 2017-07-29 | End: 2017-07-29

## 2017-07-29 RX ORDER — LINEZOLID 600 MG/300ML
INJECTION, SOLUTION INTRAVENOUS
Qty: 0 | Refills: 0 | Status: DISCONTINUED | OUTPATIENT
Start: 2017-07-29 | End: 2017-08-01

## 2017-07-29 RX ORDER — ELECTROLYTE SOLUTION,INJ
1 VIAL (ML) INTRAVENOUS
Qty: 0 | Refills: 0 | Status: DISCONTINUED | OUTPATIENT
Start: 2017-07-29 | End: 2017-07-29

## 2017-07-29 RX ORDER — MICAFUNGIN SODIUM 100 MG/1
100 INJECTION, POWDER, LYOPHILIZED, FOR SOLUTION INTRAVENOUS ONCE
Qty: 0 | Refills: 0 | Status: COMPLETED | OUTPATIENT
Start: 2017-07-29 | End: 2017-07-29

## 2017-07-29 RX ORDER — ACETAMINOPHEN 500 MG
1000 TABLET ORAL ONCE
Qty: 0 | Refills: 0 | Status: COMPLETED | OUTPATIENT
Start: 2017-07-29 | End: 2017-07-29

## 2017-07-29 RX ORDER — MICAFUNGIN SODIUM 100 MG/1
INJECTION, POWDER, LYOPHILIZED, FOR SOLUTION INTRAVENOUS
Qty: 0 | Refills: 0 | Status: DISCONTINUED | OUTPATIENT
Start: 2017-07-29 | End: 2017-08-07

## 2017-07-29 RX ORDER — SODIUM CHLORIDE 9 MG/ML
500 INJECTION INTRAMUSCULAR; INTRAVENOUS; SUBCUTANEOUS ONCE
Qty: 0 | Refills: 0 | Status: COMPLETED | OUTPATIENT
Start: 2017-07-29 | End: 2017-07-29

## 2017-07-29 RX ORDER — ALBUTEROL 90 UG/1
2.5 AEROSOL, METERED ORAL ONCE
Qty: 0 | Refills: 0 | Status: COMPLETED | OUTPATIENT
Start: 2017-07-29 | End: 2017-07-29

## 2017-07-29 RX ORDER — I.V. FAT EMULSION 20 G/100ML
50 EMULSION INTRAVENOUS
Qty: 0 | Refills: 0 | Status: DISCONTINUED | OUTPATIENT
Start: 2017-07-29 | End: 2017-08-11

## 2017-07-29 RX ORDER — ALBUMIN HUMAN 25 %
50 VIAL (ML) INTRAVENOUS EVERY 8 HOURS
Qty: 0 | Refills: 0 | Status: DISCONTINUED | OUTPATIENT
Start: 2017-07-29 | End: 2017-07-30

## 2017-07-29 RX ORDER — MICAFUNGIN SODIUM 100 MG/1
100 INJECTION, POWDER, LYOPHILIZED, FOR SOLUTION INTRAVENOUS EVERY 24 HOURS
Qty: 0 | Refills: 0 | Status: DISCONTINUED | OUTPATIENT
Start: 2017-07-30 | End: 2017-08-07

## 2017-07-29 RX ORDER — LINEZOLID 600 MG/300ML
600 INJECTION, SOLUTION INTRAVENOUS ONCE
Qty: 0 | Refills: 0 | Status: COMPLETED | OUTPATIENT
Start: 2017-07-29 | End: 2017-07-29

## 2017-07-29 RX ORDER — LINEZOLID 600 MG/300ML
600 INJECTION, SOLUTION INTRAVENOUS EVERY 12 HOURS
Qty: 0 | Refills: 0 | Status: DISCONTINUED | OUTPATIENT
Start: 2017-07-30 | End: 2017-08-01

## 2017-07-29 RX ORDER — DIATRIZOATE MEGLUMINE 180 MG/ML
30 INJECTION, SOLUTION INTRAVESICAL ONCE
Qty: 0 | Refills: 0 | Status: COMPLETED | OUTPATIENT
Start: 2017-07-29 | End: 2017-07-29

## 2017-07-29 RX ADMIN — Medication 50 MILLILITER(S): at 10:18

## 2017-07-29 RX ADMIN — Medication 1000 MILLIGRAM(S): at 13:48

## 2017-07-29 RX ADMIN — ALBUTEROL 2.5 MILLIGRAM(S): 90 AEROSOL, METERED ORAL at 03:08

## 2017-07-29 RX ADMIN — MICAFUNGIN SODIUM 105 MILLIGRAM(S): 100 INJECTION, POWDER, LYOPHILIZED, FOR SOLUTION INTRAVENOUS at 15:48

## 2017-07-29 RX ADMIN — HEPARIN SODIUM 7500 UNIT(S): 5000 INJECTION INTRAVENOUS; SUBCUTANEOUS at 05:55

## 2017-07-29 RX ADMIN — HEPARIN SODIUM 7500 UNIT(S): 5000 INJECTION INTRAVENOUS; SUBCUTANEOUS at 14:33

## 2017-07-29 RX ADMIN — COLISTIMETHATE SODIUM 100 MILLIGRAM(S): 150 INJECTION INTRAMUSCULAR; INTRAVENOUS at 13:48

## 2017-07-29 RX ADMIN — Medication 400 MILLIGRAM(S): at 11:46

## 2017-07-29 RX ADMIN — DIATRIZOATE MEGLUMINE 30 MILLILITER(S): 180 INJECTION, SOLUTION INTRAVESICAL at 11:46

## 2017-07-29 RX ADMIN — Medication 50 MILLILITER(S): at 17:14

## 2017-07-29 RX ADMIN — LINEZOLID 300 MILLIGRAM(S): 600 INJECTION, SOLUTION INTRAVENOUS at 14:33

## 2017-07-29 RX ADMIN — SODIUM CHLORIDE 3000 MILLILITER(S): 9 INJECTION INTRAMUSCULAR; INTRAVENOUS; SUBCUTANEOUS at 11:47

## 2017-07-29 RX ADMIN — Medication 3 MILLILITER(S): at 10:18

## 2017-07-29 NOTE — PROGRESS NOTE ADULT - ASSESSMENT
57 F s/p  SBR resection, fistula resection, esophagojejunostomy revision w/ ATN.    Neuro: dilaudid prn   CV: maintain  MAPs of 65  Resp: Satting well on NC  GI/FEN: strict NPO, TPN/lipids,  Vit B12   : Yu  ID: linezolid (7/25--) ///dc: Perioperative Gent/Vanc  Heme/PPX: SCDs for now, SQH  Lines: Radha (7/24--), PIVs  Drains: Right mediastinal CHECO in Right abdomen  Wounds: Midline xiphoid to pubis with prevena vac in place 57 F s/p  SBR resection, fistula resection, esophagojejunostomy revision w/ ATN.    Neuro: dilaudid prn   CV: maintain  MAPs of 65  Resp: Satting well on NC  GI/FEN: strict NPO, TPN/lipids,  Vit B12   : Yu  ID: linezolid (7/25--) ///dc: Perioperative Gent/Vanc  Heme/PPX: SCDs for now, SQH  Lines: Radha (7/24--), PIVs  Drains: Right mediastinal CHECO in Right abdomen  Wounds: Midline xiphoid to pubis with prevena vac in place    F/U serial CBCs, renew TPN, OOB as much as possible today.

## 2017-07-29 NOTE — PROCEDURE NOTE - GENERAL PROCEDURE DETAILS
large abdominal fluid collection. 10 fr drain placed under ultrasound guidance via anterior approach. 800 cc brown fluid aspirated. specimen sent to lab. drain connected to bulb.

## 2017-07-29 NOTE — PROGRESS NOTE ADULT - SUBJECTIVE AND OBJECTIVE BOX
Interventional Radiology Pre-Procedure Note    This is a 57y Female s/p multiple abdominal surgeries who presents with an abdominal fluid collection.    Procedure:    Diagnosis/Indication: Patient is a 57y old  Female who presents with a chief complaint of enterocutaneous fistula (24 Jul 2017 14:15)      PAST MEDICAL & SURGICAL HISTORY:  Reflux  Obesity  DVT (deep venous thrombosis): 2013 neck  Diverticulitis  Hypertension  Elective surgery: abodominal wall surgery  dec 2016  Elective surgery: NOV 2016 gastric bypass revision  Gastric bypass status for obesity: gastric sleeve, 6/2012  S/P breast biopsy: 2011, left  S/P colon resection: 2011  S/P knee surgery: repair 2009, 2011  right; left  Umbilical hernia: 2000  S/P appendectomy: 1975  S/P tonsillectomy: 1967       Allergies: sulfa drugs (Unknown)  sulfamethoxazole (Other)      LABS:                        9.1    5.0   )-----------( 104      ( 29 Jul 2017 14:10 )             25.5     07-29    139  |  102  |  45<H>  ----------------------------<  159<H>  3.9   |  25  |  1.00    Ca    8.2<L>      29 Jul 2017 15:55  Phos  4.0     07-29  Mg     2.7     07-29    TPro  x   /  Alb  1.4<L>  /  TBili  x   /  DBili  x   /  AST  x   /  ALT  x   /  AlkPhos  x   07-28    PT/INR - ( 29 Jul 2017 14:10 )   PT: 12.2 sec;   INR: 1.10          PTT - ( 29 Jul 2017 14:10 )  PTT:34.3 sec    Plan:  abdominal fluid collection drain placement  Procedure/ risks/ benefits were explained, informed consent obtained from patient's .

## 2017-07-29 NOTE — PROGRESS NOTE ADULT - ATTENDING COMMENTS
Reviewed wit DANNI Jimenez acting as my scribe. Patient subsequently developed T to 102 with tachypnea. CT of abdomen c/w 2 new fluid collections. ID consult obtained given high resistance of previous colonizers; to continue linezolid, added micafungin and colistin.  IR consult for drainage/culture. Continue monitoring for need for pressors/intubation.

## 2017-07-29 NOTE — PROGRESS NOTE ADULT - ASSESSMENT
58 y/o F w/PMHx of HTN, gastric bypass in 2002 c/b stricture, corkscrewed sleeve and chronic leak, recently revised on 11/28/16 with protracted (70-day) postoperative course complicated by MDR infections presents for enterocutaneous fistula resection, went to the OR for resection.  Discovered esophageal stricture intra-op and called Dr. Baird urgently to assist in the case. Pt is now s/p Exploratory laparotomy, resection of enterocutaneous fistula, resection of small bowel, resection of a small piece of liver and esophagojejunostomy with Harmony-en-Y reconstruction.     Plan: as discussed with Dr. Baird  -Continue Daily CXRs  -Primary to u/s Left chest and insert pleural pigtail if needed   -Pt to remain NPO until primary team deems pt stable and ready for barium swallow.   - Continue medical management as per primary team

## 2017-07-29 NOTE — PROGRESS NOTE ADULT - SUBJECTIVE AND OBJECTIVE BOX
S: Pt reports     Vitals: Vital Signs Last 24 Hrs  T(C): 36.4 (2017 06:00), Max: 37.1 (2017 09:35)  T(F): 97.6 (2017 06:00), Max: 98.8 (2017 09:35)  HR: 96 (2017 08:00) (80 - 96)  BP: 165/73 (2017 08:00) (77/43 - 165/73)  BP(mean): 113 (2017 08:00) (55 - 113)  RR: 24 (2017 08:00) (17 - 29)  SpO2: 92% (2017 08:00) (89% - 97%)    CAPILLARY BLOOD GLUCOSE  69 (2017 07:00)  98 (2017 21:45)  117 (2017 17:22)  154 (2017 12:04)         @ 07:  -   @ 07:00  --------------------------------------------------------  IN: 3314.4 mL / OUT: 1155 mL / NET: 2159.4 mL     @ 07:  -   @ 08:50  --------------------------------------------------------  IN: 84 mL / OUT: 50 mL / NET: 34 mL          Weight (kg): 100.3 ( @ 06:36)    Labs:                         9.7    4.4   )-----------( 95       ( 2017 04:22 )             29.6       139  |  102  |  44<H>  ----------------------------<  95  3.8   |  26  |  1.10    Ca    8.4      2017 04:22  Phos  4.0       Mg     2.7         TPro  x   /  Alb  1.4<L>  /  TBili  x   /  DBili  x   /  AST  x   /  ALT  x   /  AlkPhos  x     Urinalysis Basic - ( 2017 13:02 )    Color: Yellow / Appearance: Cloudy / S.015 / pH: x  Gluc: x / Ketone: NEGATIVE  / Bili: NEGATIVE / Urobili: 0.2 E.U./dL   Blood: x / Protein: Trace mg/dL / Nitrite: NEGATIVE   Leuk Esterase: NEGATIVE / RBC: < 5 /HPF / WBC 5-10 /HPF   Sq Epi: x / Non Sq Epi: Few /HPF / Bacteria: Present /HPF        Electrolytes repleated to goals as follows: Potassium 4, Phosphorus 3 and Magnesium 2 where appropriate and necessary    Exam:  Neuro: A&Ox3, NAD, grossly neuro intact  CV: RRR, no MRGs  Pulm: CTAB, no wheezes, rales ronchi  Abd: BS (+), Soft, NT, ND  Ext: No edema peripherally or centrally  Vasc: Extremities warm to palpation, 2+ pulses - radial and PT S: Pt reports feeling cold, no HA/SOB/CP/ Having some pain where vac has suction. Her biggest complaint is her back pain 7/10.     Vitals: Vital Signs Last 24 Hrs  T(C): 36.4 (2017 06:00), Max: 37.1 (2017 09:35)  T(F): 97.6 (2017 06:00), Max: 98.8 (2017 09:35)  HR: 96 (2017 08:00) (80 - 96)  BP: 165/73 (2017 08:00) (77/43 - 165/73)  BP(mean): 113 (2017 08:00) (55 - 113)  RR: 24 (2017 08:00) (17 - 29)  SpO2: 92% (2017 08:00) (89% - 97%)    CAPILLARY BLOOD GLUCOSE  69 (2017 07:00)  98 (2017 21:45)  117 (2017 17:22)  154 (2017 12:04)         @ 07:  -   @ 07:00  --------------------------------------------------------  IN: 3314.4 mL / OUT: 1155 mL / NET: 2159.4 mL     @ 07: @ 08:50  --------------------------------------------------------  IN: 84 mL / OUT: 50 mL / NET: 34 mL          Weight (kg): 100.3 ( @ 06:36)    Labs:                         9.7    4.4   )-----------( 95       ( 2017 04:22 )             29.6       139  |  102  |  44<H>  ----------------------------<  95  3.8   |  26  |  1.10    Ca    8.4      2017 04:22  Phos  4.0       Mg     2.7         TPro  x   /  Alb  1.4<L>  /  TBili  x   /  DBili  x   /  AST  x   /  ALT  x   /  AlkPhos  x     Urinalysis Basic - ( 2017 13:02 )    Color: Yellow / Appearance: Cloudy / S.015 / pH: x  Gluc: x / Ketone: NEGATIVE  / Bili: NEGATIVE / Urobili: 0.2 E.U./dL   Blood: x / Protein: Trace mg/dL / Nitrite: NEGATIVE   Leuk Esterase: NEGATIVE / RBC: < 5 /HPF / WBC 5-10 /HPF   Sq Epi: x / Non Sq Epi: Few /HPF / Bacteria: Present /HPF        Electrolytes repleated to goals as follows: Potassium 4, Phosphorus 3 and Magnesium 2 where appropriate and necessary    Exam:  Neuro: A&Ox3, NAD, grossly neuro intact  CV: RRR, no MRGs  Pulm: CTAB, no wheezes, rales ronchi  Abd: BS (+), Obese, Soft, NT, ND, central vac with suction in place draining SS fluid.   Ext: 2+ UE only edema, no LE edema. No central edema.   Vasc: Extremities warm to palpation, 2+ pulses - radial and PT S: Pt reports feeling cold, no HA/SOB/CP/ Having some pain where vac has suction. Her biggest complaint is her back pain 7/10.     Vitals: Vital Signs Last 24 Hrs  T(C): 36.4 (2017 06:00), Max: 37.1 (2017 09:35)  T(F): 97.6 (2017 06:00), Max: 98.8 (2017 09:35)  HR: 96 (2017 08:00) (80 - 96)  BP: 165/73 (2017 08:00) (77/43 - 165/73)  BP(mean): 113 (2017 08:00) (55 - 113)  RR: 24 (2017 08:00) (17 - 29)  SpO2: 92% (2017 08:00) (89% - 97%)    CAPILLARY BLOOD GLUCOSE  69 (2017 07:00)  98 (2017 21:45)  117 (2017 17:22)  154 (2017 12:04)         @ 07:  -   @ 07:00  --------------------------------------------------------  IN: 3314.4 mL / OUT: 1155 mL / NET: 2159.4 mL     @ 07: @ 08:50  --------------------------------------------------------  IN: 84 mL / OUT: 50 mL / NET: 34 mL          Weight (kg): 100.3 ( @ 06:36)    Labs:                         9.7    4.4   )-----------( 95       ( 2017 04:22 )             29.6       139  |  102  |  44<H>  ----------------------------<  95  3.8   |  26  |  1.10    Ca    8.4      2017 04:22  Phos  4.0       Mg     2.7         TPro  x   /  Alb  1.4<L>  /  TBili  x   /  DBili  x   /  AST  x   /  ALT  x   /  AlkPhos  x     Urinalysis Basic - ( 2017 13:02 )    Color: Yellow / Appearance: Cloudy / S.015 / pH: x  Gluc: x / Ketone: NEGATIVE  / Bili: NEGATIVE / Urobili: 0.2 E.U./dL   Blood: x / Protein: Trace mg/dL / Nitrite: NEGATIVE   Leuk Esterase: NEGATIVE / RBC: < 5 /HPF / WBC 5-10 /HPF   Sq Epi: x / Non Sq Epi: Few /HPF / Bacteria: Present /HPF        Electrolytes repleated to goals as follows: Potassium 4, Phosphorus 3 and Magnesium 2 where appropriate and necessary    Exam:  Neuro: A&Ox3, NAD, grossly neuro intact  HEENT: PERRL, EOMI, MMD  Neck: supple  CV: RRR, no MRGs  Pulm: CTAB, no wheezes, rales ronchi  Abd: BS (+), Obese, Soft, NT, ND, central vac with suction in place draining SS fluid.   : stone in place  Ext: 2+ UE only edema, no LE edema. No central edema.   Vasc: Extremities warm to palpation, 2+ pulses - radial and PT  MSK: no joint swelling  Skin: no rash

## 2017-07-29 NOTE — PROGRESS NOTE ADULT - SUBJECTIVE AND OBJECTIVE BOX
Patient discussed on morning rounds with Dr. Baird     Operation / Date: 17: Exploratory laparotomy, resection of enterocutaneous fistula, resection of small bowel, resection of a small piece of liver and esophagojejunostomy with Harmony-en-Y reconstruction.    SUBJECTIVE ASSESSMENT:  57y Female c/o chills and back/abd pain and shortness of breath. Lethargic at the bedside.    Vital Signs Last 24 Hrs  T(C): 38.2 (2017 13:30), Max: 38.2 (2017 13:30)  T(F): 100.8 (2017 13:30), Max: 100.8 (2017 13:30)  HR: 100 (2017 17:00) (80 - 122)  BP: 159/86 (2017 17:00) (77/43 - 165/85)  BP(mean): 112 (2017 17:00) (55 - 118)  RR: 36 (2017 17:00) (17 - 41)  SpO2: 93% (2017 17:00) (89% - 99%)  I&O's Detail    2017 07:01  -  2017 07:00  --------------------------------------------------------  IN:    Fat Emulsion 20%: 167.4 mL    IV PiggyBack: 1250 mL    Packed Red Blood Cells: 700 mL    TPN (Total Parenteral Nutrition): 1197 mL  Total IN: 3314.4 mL    OUT:    Bulb: 30 mL    Indwelling Catheter - Urethral: 1125 mL  Total OUT: 1155 mL    Total NET: 2159.4 mL      2017 07:01  -  2017 17:30  --------------------------------------------------------  IN:    Fat Emulsion 20%: 147 mL    IV PiggyBack: 450 mL    TPN (Total Parenteral Nutrition): 630 mL  Total IN: 1227 mL    OUT:    Indwelling Catheter - Urethral: 695 mL  Total OUT: 695 mL    Total NET: 532 mL      MARCIA DRAIN:  Yes to self suction  LI: Yes    PHYSICAL EXAM:    General: lethargic laying in bed   Neurological: arousable to vocal commands. follows directions and moves all extremities   Cardiovascular: Tachycardic, no m/r/g  Respiratory: Tachypnea, Decreased breath sounds to LLL. B/L scattered wheezing anteriorly. unable to assess posterior lung fields due to positioning   Gastrointestinal: Obese abd. TTP to incisions.  Extremities: Trace edema at ankle b/l and +1 pitting edema to b/l UE  Incision Sites: Central abd incisional bandage CDI. +Right Abd CHECO drain with serosanguinous fluid in tubing    LABS:                        9.1    5.0   )-----------( 104      ( 2017 14:10 )             25.5       COUMADIN:  No.     PT/INR - ( 2017 14:10 )   PT: 12.2 sec;   INR: 1.10      PTT - ( 2017 14:10 )  PTT:34.3 sec        139  |  102  |  45<H>  ----------------------------<  159<H>  3.9   |  25  |  1.00    Ca    8.2<L>      2017 15:55  Phos  4.0       Mg     2.7         TPro  x   /  Alb  1.4<L>  /  TBili  x   /  DBili  x   /  AST  x   /  ALT  x   /  AlkPhos  x         Urinalysis Basic - ( 2017 13:02 )    Color: Yellow / Appearance: Cloudy / S.015 / pH: x  Gluc: x / Ketone: NEGATIVE  / Bili: NEGATIVE / Urobili: 0.2 E.U./dL   Blood: x / Protein: Trace mg/dL / Nitrite: NEGATIVE   Leuk Esterase: NEGATIVE / RBC: < 5 /HPF / WBC 5-10 /HPF   Sq Epi: x / Non Sq Epi: Few /HPF / Bacteria: Present /HPF        MEDICATIONS  (STANDING):  insulin lispro (HumaLOG) corrective regimen sliding scale   SubCutaneous Before meals and at bedtime  dextrose 5%. 1000 milliLiter(s) (50 mL/Hr) IV Continuous <Continuous>  dextrose 50% Injectable 25 Gram(s) IV Push once  sodium chloride 0.9% lock flush 20 milliLiter(s) IV Push once  cyanocobalamin Injectable 1000 MICROGram(s) SubCutaneous every 7 days  dextrose 50% Injectable 12.5 Gram(s) IV Push once  linezolid  IVPB   IV Intermittent   heparin  Injectable 7500 Unit(s) SubCutaneous every 8 hours  albumin human 25% IVPB 50 milliLiter(s) IV Intermittent every 8 hours  Parenteral Nutrition - Adult 1 Each (52 mL/Hr) TPN Continuous <Continuous>  fat emulsion 20% Infusion 50 Gram(s) (20.8 mL/Hr) IV Continuous <Continuous>  micafungin IVPB   IV Intermittent   linezolid  IVPB   IV Intermittent   colistimethate IVPB 150 milliGRAM(s) IV Intermittent every 12 hours    MEDICATIONS  (PRN):  ondansetron Injectable 4 milliGRAM(s) IV Push every 6 hours PRN Nausea  sodium chloride 0.9% lock flush 10 milliLiter(s) IV Push every 1 hour PRN After each medication administration  sodium chloride 0.9% lock flush 10 milliLiter(s) IV Push every 12 hours PRN Lumen of catheter NOT used        RADIOLOGY & ADDITIONAL TESTS:  CXR: < from: Xray Chest 1 View AP -PORTABLE-Routine (17 @ 07:26) >  Silhouetting left hemidiaphragm consistent with effusion.   Possible small right effusion. Limited inspiration. Congestive change   and/or infiltrates cannot be excluded

## 2017-07-30 LAB
ANION GAP SERPL CALC-SCNC: 12 MMOL/L — SIGNIFICANT CHANGE UP (ref 5–17)
APTT BLD: 31.9 SEC — SIGNIFICANT CHANGE UP (ref 27.5–37.4)
BASE EXCESS BLDA CALC-SCNC: 0.4 MMOL/L — SIGNIFICANT CHANGE UP (ref -2–3)
BUN SERPL-MCNC: 40 MG/DL — HIGH (ref 7–23)
CALCIUM SERPL-MCNC: 8.2 MG/DL — LOW (ref 8.4–10.5)
CHLORIDE SERPL-SCNC: 101 MMOL/L — SIGNIFICANT CHANGE UP (ref 96–108)
CO2 SERPL-SCNC: 25 MMOL/L — SIGNIFICANT CHANGE UP (ref 22–31)
CREAT SERPL-MCNC: 0.9 MG/DL — SIGNIFICANT CHANGE UP (ref 0.5–1.3)
GAS PNL BLDA: SIGNIFICANT CHANGE UP
GAS PNL BLDV: SIGNIFICANT CHANGE UP
GLUCOSE SERPL-MCNC: 91 MG/DL — SIGNIFICANT CHANGE UP (ref 70–99)
GRAM STN FLD: SIGNIFICANT CHANGE UP
HCO3 BLDA-SCNC: 25 MMOL/L — SIGNIFICANT CHANGE UP (ref 21–28)
HCT VFR BLD CALC: 26.1 % — LOW (ref 34.5–45)
HGB BLD-MCNC: 9.3 G/DL — LOW (ref 11.5–15.5)
INR BLD: 1.15 — SIGNIFICANT CHANGE UP (ref 0.88–1.16)
LACTATE SERPL-SCNC: 2.6 MMOL/L — HIGH (ref 0.5–2)
LACTATE SERPL-SCNC: 2.8 MMOL/L — HIGH (ref 0.5–2)
LACTATE SERPL-SCNC: 2.9 MMOL/L — HIGH (ref 0.5–2)
MCHC RBC-ENTMCNC: 29.7 PG — SIGNIFICANT CHANGE UP (ref 27–34)
MCHC RBC-ENTMCNC: 35.6 G/DL — SIGNIFICANT CHANGE UP (ref 32–36)
MCV RBC AUTO: 83.4 FL — SIGNIFICANT CHANGE UP (ref 80–100)
PCO2 BLDA: 38 MMHG — SIGNIFICANT CHANGE UP (ref 32–45)
PH BLDA: 7.43 — SIGNIFICANT CHANGE UP (ref 7.35–7.45)
PLATELET # BLD AUTO: 116 K/UL — LOW (ref 150–400)
PO2 BLDA: 91 MMHG — SIGNIFICANT CHANGE UP (ref 83–108)
POTASSIUM SERPL-MCNC: 3.9 MMOL/L — SIGNIFICANT CHANGE UP (ref 3.5–5.3)
POTASSIUM SERPL-SCNC: 3.9 MMOL/L — SIGNIFICANT CHANGE UP (ref 3.5–5.3)
PROTHROM AB SERPL-ACNC: 12.8 SEC — HIGH (ref 9.8–12.7)
RBC # BLD: 3.13 M/UL — LOW (ref 3.8–5.2)
RBC # FLD: 16 % — SIGNIFICANT CHANGE UP (ref 10.3–16.9)
SAO2 % BLDA: 97 % — SIGNIFICANT CHANGE UP (ref 95–100)
SODIUM SERPL-SCNC: 138 MMOL/L — SIGNIFICANT CHANGE UP (ref 135–145)
SPECIMEN SOURCE: SIGNIFICANT CHANGE UP
WBC # BLD: 6.7 K/UL — SIGNIFICANT CHANGE UP (ref 3.8–10.5)
WBC # FLD AUTO: 6.7 K/UL — SIGNIFICANT CHANGE UP (ref 3.8–10.5)

## 2017-07-30 PROCEDURE — 71010: CPT | Mod: 26,77

## 2017-07-30 PROCEDURE — 71010: CPT | Mod: 26

## 2017-07-30 PROCEDURE — 99291 CRITICAL CARE FIRST HOUR: CPT

## 2017-07-30 RX ORDER — FENTANYL CITRATE 50 UG/ML
0.1 INJECTION INTRAVENOUS
Qty: 2500 | Refills: 0 | Status: DISCONTINUED | OUTPATIENT
Start: 2017-07-30 | End: 2017-08-04

## 2017-07-30 RX ORDER — ELECTROLYTE SOLUTION,INJ
1 VIAL (ML) INTRAVENOUS
Qty: 0 | Refills: 0 | Status: DISCONTINUED | OUTPATIENT
Start: 2017-07-30 | End: 2017-07-30

## 2017-07-30 RX ORDER — NOREPINEPHRINE BITARTRATE/D5W 8 MG/250ML
0.01 PLASTIC BAG, INJECTION (ML) INTRAVENOUS
Qty: 8 | Refills: 0 | Status: DISCONTINUED | OUTPATIENT
Start: 2017-07-30 | End: 2017-08-04

## 2017-07-30 RX ORDER — ACETAMINOPHEN 500 MG
1000 TABLET ORAL ONCE
Qty: 0 | Refills: 0 | Status: COMPLETED | OUTPATIENT
Start: 2017-07-30 | End: 2017-07-30

## 2017-07-30 RX ORDER — I.V. FAT EMULSION 20 G/100ML
20 EMULSION INTRAVENOUS
Qty: 0 | Refills: 0 | Status: DISCONTINUED | OUTPATIENT
Start: 2017-07-30 | End: 2017-07-31

## 2017-07-30 RX ORDER — PROPOFOL 10 MG/ML
1.66 INJECTION, EMULSION INTRAVENOUS
Qty: 1000 | Refills: 0 | Status: DISCONTINUED | OUTPATIENT
Start: 2017-07-30 | End: 2017-08-12

## 2017-07-30 RX ADMIN — Medication 400 MILLIGRAM(S): at 09:04

## 2017-07-30 RX ADMIN — HEPARIN SODIUM 7500 UNIT(S): 5000 INJECTION INTRAVENOUS; SUBCUTANEOUS at 14:47

## 2017-07-30 RX ADMIN — Medication 1 MICROGRAM(S)/KG/MIN: at 09:04

## 2017-07-30 RX ADMIN — LINEZOLID 300 MILLIGRAM(S): 600 INJECTION, SOLUTION INTRAVENOUS at 17:01

## 2017-07-30 RX ADMIN — COLISTIMETHATE SODIUM 100 MILLIGRAM(S): 150 INJECTION INTRAMUSCULAR; INTRAVENOUS at 17:48

## 2017-07-30 RX ADMIN — HEPARIN SODIUM 7500 UNIT(S): 5000 INJECTION INTRAVENOUS; SUBCUTANEOUS at 22:46

## 2017-07-30 RX ADMIN — LINEZOLID 300 MILLIGRAM(S): 600 INJECTION, SOLUTION INTRAVENOUS at 07:08

## 2017-07-30 RX ADMIN — PROPOFOL 1 MICROGRAM(S)/KG/MIN: 10 INJECTION, EMULSION INTRAVENOUS at 17:48

## 2017-07-30 RX ADMIN — HEPARIN SODIUM 7500 UNIT(S): 5000 INJECTION INTRAVENOUS; SUBCUTANEOUS at 07:08

## 2017-07-30 RX ADMIN — COLISTIMETHATE SODIUM 100 MILLIGRAM(S): 150 INJECTION INTRAMUSCULAR; INTRAVENOUS at 09:03

## 2017-07-30 RX ADMIN — Medication 1 MICROGRAM(S)/KG/MIN: at 17:01

## 2017-07-30 RX ADMIN — Medication 1 EACH: at 17:48

## 2017-07-30 RX ADMIN — MICAFUNGIN SODIUM 105 MILLIGRAM(S): 100 INJECTION, POWDER, LYOPHILIZED, FOR SOLUTION INTRAVENOUS at 12:55

## 2017-07-30 NOTE — CHART NOTE - NSCHARTNOTEFT_GEN_A_CORE
CENTRAL LINE INSERTION NOTE    Type of Procedure: Central Venous Catheter Placement  Performed by: Brandon Gil       Technique: A time out was preformed identifying the correct procedure, the correct location with the nursing staff.  The left neck was prepped and draped in the usual sterile fashion.  1% lidocaine was administered subcutaneously for local anesthesia.  The left internal jugular vein was accessed under ultrasound guidance. Using modified Seldinger technique,  a triple lumen was inserted. Blood was withdrawn from all lumens and flushed with normal saline.  The catheter was sutured in place and a sterile dressing was applied over the site. The patient tolerated the procedure well.

## 2017-07-30 NOTE — BRIEF OPERATIVE NOTE - OPERATION/FINDINGS
small bowel anastamosis in BP limb breakdown with succus entericus throughout abdomen.  primary repair with irrigation, drainage and abthera abdominal vac placement

## 2017-07-30 NOTE — BRIEF OPERATIVE NOTE - PROCEDURE
Exploratory laparotomy  07/30/2017  primary repair of anastamotic breakdown, abdominal vac placement  Active  [id number]

## 2017-07-30 NOTE — PROGRESS NOTE ADULT - ASSESSMENT
57 F s/p  SBR resection, fistula resection, esophagojejunostomy revision w/ ATN.    Neuro: proporfol, fentanyl gtt  CV: maintain  MAPs of 65, levophed gtt  Resp: /40/12/5  GI/FEN: strict NPO, TPN/lipids,  Vit B12, albumin 25%q8h  : Yu  ID: linezolid (7/25--), micafungin (7/29-), colistin (7/29-) ///dc: Perioperative Gent/Vanc  Heme/PPX: SCDs for now, SQH  Lines: Radha (7/24--), PIVs  Drains: Right mediastinal CHECO in Right abdomen  Wounds: Midline xiphoid to pubis with prevena vac in place 57 F s/p  SBR resection, fistula resection, esophagojejunostomy revision w/ ATN. Now s/p return to OR for washout and leak repair, acute respiratory failure, hypotension ATN fever    Neuro: proporfol, fentanyl gtt  CV: maintain  MAPs of 65, levophed gtt likely related to sedation and degree of septic shock  Resp: /40/12/5  GI/FEN: strict NPO, TPN/lipids,  Vit B12, albumin 25%q8h  : Yu  ID: linezolid (7/25--), micafungin (7/29-), colistin (7/29-) empirically based on previous cultures. ///dc: Perioperative Gent/Vanc  Heme/PPX: SCDs for now, SQH  Lines: Radha (7/24--), PIVs  Drains: Right mediastinal CHECO in Right abdomen  Wounds: Midline xiphoid to pubis with prevena vac in place

## 2017-07-30 NOTE — PROGRESS NOTE ADULT - SUBJECTIVE AND OBJECTIVE BOX
S: Pt sedated, ROS deferred    Vitals: Vital Signs Last 24 Hrs  T(C): 37.5 (2017 05:42), Max: 38.2 (2017 13:30)  T(F): 99.5 (2017 05:42), Max: 100.8 (2017 13:30)  HR: 104 (2017 07:00) (84 - 122)  BP: 119/76 (2017 21:59) (105/73 - 169/87)  BP(mean): 128 (2017 19:00) (83 - 128)  RR: 20 (2017 07:00) (12 - 41)  SpO2: 100% (2017 07:00) (90% - 100%)    CAPILLARY BLOOD GLUCOSE  129 (2017 16:00)  84 (2017 09:00)         @ 07:01  -   @ 07:00  --------------------------------------------------------  IN: 1416 mL / OUT: 1230 mL / NET: 186 mL            Labs:                         9.3    6.7   )-----------( 116      ( 2017 01:42 )             26.1   0730    138  |  101  |  40<H>  ----------------------------<  91  3.9   |  25  |  0.90    Ca    8.2<L>      2017 01:42  Phos  4.0       Mg     2.7     29    TPro  x   /  Alb  1.4<L>  /  TBili  x   /  DBili  x   /  AST  x   /  ALT  x   /  AlkPhos  x     PT/INR - ( 2017 01:42 )   PT: 12.8 sec;   INR: 1.15          PTT - ( 2017 01:42 )  PTT:31.9 secUrinalysis Basic - ( 2017 13:02 )    Color: Yellow / Appearance: Cloudy / S.015 / pH: x  Gluc: x / Ketone: NEGATIVE  / Bili: NEGATIVE / Urobili: 0.2 E.U./dL   Blood: x / Protein: Trace mg/dL / Nitrite: NEGATIVE   Leuk Esterase: NEGATIVE / RBC: < 5 /HPF / WBC 5-10 /HPF   Sq Epi: x / Non Sq Epi: Few /HPF / Bacteria: Present /HPF        Electrolytes repleated to goals as follows: Potassium 4, Phosphorus 3 and Magnesium 2 where appropriate and necessary    Exam:  Neuro: A&Ox3, NAD, grossly neuro intact  HEENT: PERRL, EOMI, MMD  Neck: supple  CV: RRR, no MRGs  Pulm: CTAB, no wheezes, rales ronchi  Abd: BS (-), Obese, Soft, NT, ND, central vac with suction in place draining SS fluid. 2 drains with scant bilious drainage  : stone in place  Ext: 2+ UE only edema, no LE edema. No central edema.   Vasc: Extremities warm to palpation, 2+ pulses - radial and PT  MSK: no joint swelling  Skin: no rash S: Pt sedated, ROS deferred. Returned to OR for abdominal washout overnight.    Vitals: Vital Signs Last 24 Hrs  T(C): 37.5 (2017 05:42), Max: 38.2 (2017 13:30)  T(F): 99.5 (2017 05:42), Max: 100.8 (2017 13:30)  HR: 104 (2017 07:00) (84 - 122)  BP: 119/76 (2017 21:59) (105/73 - 169/87)  BP(mean): 128 (2017 19:00) (83 - 128)  RR: 20 (2017 07:00) (12 - 41)  SpO2: 100% (2017 07:00) (90% - 100%)    CAPILLARY BLOOD GLUCOSE  129 (2017 16:00)  84 (2017 09:00)         @ 07:01  -  30 @ 07:00  --------------------------------------------------------  IN: 1416 mL / OUT: 1230 mL / NET: 186 mL            Labs:                         9.3    6.7   )-----------( 116      ( 2017 01:42 )             26.1   07-30    138  |  101  |  40<H>  ----------------------------<  91  3.9   |  25  |  0.90    Ca    8.2<L>      2017 01:42  Phos  4.0       Mg     2.7     29    TPro  x   /  Alb  1.4<L>  /  TBili  x   /  DBili  x   /  AST  x   /  ALT  x   /  AlkPhos  x     PT/INR - ( 2017 01:42 )   PT: 12.8 sec;   INR: 1.15          PTT - ( 2017 01:42 )  PTT:31.9 secUrinalysis Basic - ( 2017 13:02 )    Color: Yellow / Appearance: Cloudy / S.015 / pH: x  Gluc: x / Ketone: NEGATIVE  / Bili: NEGATIVE / Urobili: 0.2 E.U./dL   Blood: x / Protein: Trace mg/dL / Nitrite: NEGATIVE   Leuk Esterase: NEGATIVE / RBC: < 5 /HPF / WBC 5-10 /HPF   Sq Epi: x / Non Sq Epi: Few /HPF / Bacteria: Present /HPF        Electrolytes repleated to goals as follows: Potassium 4, Phosphorus 3 and Magnesium 2 where appropriate and necessary    Exam:  Neuro: A&Ox3, NAD, grossly neuro intact  HEENT: PERRL, EOMI, MMD  Neck: supple  CV: RRR, no MRGs  Pulm: CTAB, no wheezes, rales ronchi  Abd: BS (-), Obese, Soft, NT, ND, central vac with suction in place draining SS fluid. 2 drains with scant bilious drainage  : stone in place  Ext: 2+ UE only edema, no LE edema. No central edema.   Vasc: Extremities warm to palpation, 2+ pulses - radial and PT  MSK: no joint swelling  Skin: no rash

## 2017-07-31 LAB
-  AMPICILLIN/SULBACTAM: SIGNIFICANT CHANGE UP
-  AMPICILLIN: SIGNIFICANT CHANGE UP
-  CEFAZOLIN: SIGNIFICANT CHANGE UP
-  CEFTRIAXONE: SIGNIFICANT CHANGE UP
-  CIPROFLOXACIN: SIGNIFICANT CHANGE UP
-  GENTAMICIN: SIGNIFICANT CHANGE UP
-  PIPERACILLIN/TAZOBACTAM: SIGNIFICANT CHANGE UP
-  TOBRAMYCIN: SIGNIFICANT CHANGE UP
-  TRIMETHOPRIM/SULFAMETHOXAZOLE: SIGNIFICANT CHANGE UP
ANION GAP SERPL CALC-SCNC: 11 MMOL/L — SIGNIFICANT CHANGE UP (ref 5–17)
ANION GAP SERPL CALC-SCNC: 12 MMOL/L — SIGNIFICANT CHANGE UP (ref 5–17)
BUN SERPL-MCNC: 29 MG/DL — HIGH (ref 7–23)
BUN SERPL-MCNC: 32 MG/DL — HIGH (ref 7–23)
CALCIUM SERPL-MCNC: 7.5 MG/DL — LOW (ref 8.4–10.5)
CALCIUM SERPL-MCNC: 7.9 MG/DL — LOW (ref 8.4–10.5)
CHLORIDE SERPL-SCNC: 102 MMOL/L — SIGNIFICANT CHANGE UP (ref 96–108)
CHLORIDE SERPL-SCNC: 104 MMOL/L — SIGNIFICANT CHANGE UP (ref 96–108)
CO2 SERPL-SCNC: 25 MMOL/L — SIGNIFICANT CHANGE UP (ref 22–31)
CO2 SERPL-SCNC: 26 MMOL/L — SIGNIFICANT CHANGE UP (ref 22–31)
CREAT SERPL-MCNC: 0.8 MG/DL — SIGNIFICANT CHANGE UP (ref 0.5–1.3)
CREAT SERPL-MCNC: 0.8 MG/DL — SIGNIFICANT CHANGE UP (ref 0.5–1.3)
CULTURE RESULTS: SIGNIFICANT CHANGE UP
CULTURE RESULTS: SIGNIFICANT CHANGE UP
GLUCOSE SERPL-MCNC: 136 MG/DL — HIGH (ref 70–99)
GLUCOSE SERPL-MCNC: 136 MG/DL — HIGH (ref 70–99)
HCT VFR BLD CALC: 22.9 % — LOW (ref 34.5–45)
HCT VFR BLD CALC: 24.5 % — LOW (ref 34.5–45)
HGB BLD-MCNC: 7.9 G/DL — LOW (ref 11.5–15.5)
HGB BLD-MCNC: 8.4 G/DL — LOW (ref 11.5–15.5)
LACTATE SERPL-SCNC: 2.4 MMOL/L — HIGH (ref 0.5–2)
MAGNESIUM SERPL-MCNC: 2.3 MG/DL — SIGNIFICANT CHANGE UP (ref 1.6–2.6)
MAGNESIUM SERPL-MCNC: 2.5 MG/DL — SIGNIFICANT CHANGE UP (ref 1.6–2.6)
MCHC RBC-ENTMCNC: 29.3 PG — SIGNIFICANT CHANGE UP (ref 27–34)
MCHC RBC-ENTMCNC: 29.4 PG — SIGNIFICANT CHANGE UP (ref 27–34)
MCHC RBC-ENTMCNC: 34.3 G/DL — SIGNIFICANT CHANGE UP (ref 32–36)
MCHC RBC-ENTMCNC: 34.5 G/DL — SIGNIFICANT CHANGE UP (ref 32–36)
MCV RBC AUTO: 85.1 FL — SIGNIFICANT CHANGE UP (ref 80–100)
MCV RBC AUTO: 85.4 FL — SIGNIFICANT CHANGE UP (ref 80–100)
METHOD TYPE: SIGNIFICANT CHANGE UP
PHOSPHATE SERPL-MCNC: 4.6 MG/DL — HIGH (ref 2.5–4.5)
PHOSPHATE SERPL-MCNC: 5 MG/DL — HIGH (ref 2.5–4.5)
PLATELET # BLD AUTO: 137 K/UL — LOW (ref 150–400)
PLATELET # BLD AUTO: 140 K/UL — LOW (ref 150–400)
POTASSIUM SERPL-MCNC: 3.6 MMOL/L — SIGNIFICANT CHANGE UP (ref 3.5–5.3)
POTASSIUM SERPL-MCNC: 4 MMOL/L — SIGNIFICANT CHANGE UP (ref 3.5–5.3)
POTASSIUM SERPL-SCNC: 3.6 MMOL/L — SIGNIFICANT CHANGE UP (ref 3.5–5.3)
POTASSIUM SERPL-SCNC: 4 MMOL/L — SIGNIFICANT CHANGE UP (ref 3.5–5.3)
RBC # BLD: 2.69 M/UL — LOW (ref 3.8–5.2)
RBC # BLD: 2.87 M/UL — LOW (ref 3.8–5.2)
RBC # FLD: 16.3 % — SIGNIFICANT CHANGE UP (ref 10.3–16.9)
RBC # FLD: 16.4 % — SIGNIFICANT CHANGE UP (ref 10.3–16.9)
SODIUM SERPL-SCNC: 138 MMOL/L — SIGNIFICANT CHANGE UP (ref 135–145)
SODIUM SERPL-SCNC: 142 MMOL/L — SIGNIFICANT CHANGE UP (ref 135–145)
SPECIMEN SOURCE: SIGNIFICANT CHANGE UP
SPECIMEN SOURCE: SIGNIFICANT CHANGE UP
WBC # BLD: 10.8 K/UL — HIGH (ref 3.8–10.5)
WBC # BLD: 10.8 K/UL — HIGH (ref 3.8–10.5)
WBC # FLD AUTO: 10.8 K/UL — HIGH (ref 3.8–10.5)
WBC # FLD AUTO: 10.8 K/UL — HIGH (ref 3.8–10.5)

## 2017-07-31 PROCEDURE — 71010: CPT | Mod: 26

## 2017-07-31 RX ORDER — I.V. FAT EMULSION 20 G/100ML
20 EMULSION INTRAVENOUS
Qty: 0 | Refills: 0 | Status: DISCONTINUED | OUTPATIENT
Start: 2017-07-31 | End: 2017-07-31

## 2017-07-31 RX ORDER — ACETAMINOPHEN 500 MG
1000 TABLET ORAL ONCE
Qty: 0 | Refills: 0 | Status: COMPLETED | OUTPATIENT
Start: 2017-07-31 | End: 2017-07-31

## 2017-07-31 RX ORDER — FUROSEMIDE 40 MG
40 TABLET ORAL ONCE
Qty: 0 | Refills: 0 | Status: COMPLETED | OUTPATIENT
Start: 2017-07-31 | End: 2017-07-31

## 2017-07-31 RX ORDER — ELECTROLYTE SOLUTION,INJ
1 VIAL (ML) INTRAVENOUS
Qty: 0 | Refills: 0 | Status: DISCONTINUED | OUTPATIENT
Start: 2017-07-31 | End: 2017-07-31

## 2017-07-31 RX ORDER — POTASSIUM CHLORIDE 20 MEQ
20 PACKET (EA) ORAL ONCE
Qty: 0 | Refills: 0 | Status: COMPLETED | OUTPATIENT
Start: 2017-07-31 | End: 2017-07-31

## 2017-07-31 RX ADMIN — Medication 40 MILLIGRAM(S): at 09:18

## 2017-07-31 RX ADMIN — COLISTIMETHATE SODIUM 100 MILLIGRAM(S): 150 INJECTION INTRAMUSCULAR; INTRAVENOUS at 06:05

## 2017-07-31 RX ADMIN — HEPARIN SODIUM 7500 UNIT(S): 5000 INJECTION INTRAVENOUS; SUBCUTANEOUS at 06:05

## 2017-07-31 RX ADMIN — LINEZOLID 300 MILLIGRAM(S): 600 INJECTION, SOLUTION INTRAVENOUS at 17:37

## 2017-07-31 RX ADMIN — COLISTIMETHATE SODIUM 100 MILLIGRAM(S): 150 INJECTION INTRAMUSCULAR; INTRAVENOUS at 17:37

## 2017-07-31 RX ADMIN — Medication 40 MILLIGRAM(S): at 19:30

## 2017-07-31 RX ADMIN — MICAFUNGIN SODIUM 105 MILLIGRAM(S): 100 INJECTION, POWDER, LYOPHILIZED, FOR SOLUTION INTRAVENOUS at 11:59

## 2017-07-31 RX ADMIN — HEPARIN SODIUM 7500 UNIT(S): 5000 INJECTION INTRAVENOUS; SUBCUTANEOUS at 13:47

## 2017-07-31 RX ADMIN — LINEZOLID 300 MILLIGRAM(S): 600 INJECTION, SOLUTION INTRAVENOUS at 06:05

## 2017-07-31 RX ADMIN — Medication 100 MILLIEQUIVALENT(S): at 17:36

## 2017-07-31 RX ADMIN — HEPARIN SODIUM 7500 UNIT(S): 5000 INJECTION INTRAVENOUS; SUBCUTANEOUS at 21:56

## 2017-07-31 RX ADMIN — Medication 400 MILLIGRAM(S): at 02:11

## 2017-07-31 RX ADMIN — I.V. FAT EMULSION 8.3 GRAM(S): 20 EMULSION INTRAVENOUS at 00:50

## 2017-07-31 NOTE — PROGRESS NOTE ADULT - SUBJECTIVE AND OBJECTIVE BOX
INTERVAL HPI/OVERNIGHT EVENTS:  O/N: Lactate 2.9--> 2.8, T 101.2 Tylenol given. U/A to be sent     Pt seen and examined at bedside this AM. Pt intubated, but responsive to sound and light touch. No complaints at this time.     MEDICATIONS  (STANDING):  insulin lispro (HumaLOG) corrective regimen sliding scale   SubCutaneous Before meals and at bedtime  dextrose 5%. 1000 milliLiter(s) (50 mL/Hr) IV Continuous <Continuous>  dextrose 50% Injectable 25 Gram(s) IV Push once  sodium chloride 0.9% lock flush 20 milliLiter(s) IV Push once  cyanocobalamin Injectable 1000 MICROGram(s) SubCutaneous every 7 days  dextrose 50% Injectable 12.5 Gram(s) IV Push once  linezolid  IVPB   IV Intermittent   heparin  Injectable 7500 Unit(s) SubCutaneous every 8 hours  fat emulsion 20% Infusion 50 Gram(s) (20.8 mL/Hr) IV Continuous <Continuous>  micafungin IVPB 100 milliGRAM(s) IV Intermittent every 24 hours  micafungin IVPB   IV Intermittent   linezolid  IVPB   IV Intermittent   colistimethate IVPB 150 milliGRAM(s) IV Intermittent every 12 hours  linezolid  IVPB 600 milliGRAM(s) IV Intermittent every 12 hours  propofol Infusion 1.662 MICROgram(s)/kG/Min (1 mL/Hr) IV Continuous <Continuous>  fentaNYL   Infusion 0.1 MICROgram(s)/kG/Hr (1 mL/Hr) IV Continuous <Continuous>  norepinephrine Infusion 0.005 MICROgram(s)/kG/Min (1 mL/Hr) IV Continuous <Continuous>  Parenteral Nutrition - Adult 1 Each (50 mL/Hr) TPN Continuous <Continuous>  Parenteral Nutrition - Adult 1 Each (56 mL/Hr) TPN Continuous <Continuous>  fat emulsion 20% Infusion 20 Gram(s) (8.3 mL/Hr) IV Continuous <Continuous>    MEDICATIONS  (PRN):  ondansetron Injectable 4 milliGRAM(s) IV Push every 6 hours PRN Nausea  sodium chloride 0.9% lock flush 10 milliLiter(s) IV Push every 1 hour PRN After each medication administration  sodium chloride 0.9% lock flush 10 milliLiter(s) IV Push every 12 hours PRN Lumen of catheter NOT used      Drug Dosing Weight  Height (cm): 160.02 (24 Jul 2017 06:32)  Weight (kg): 100.3 (29 Jul 2017 06:36)  BMI (kg/m2): 39.2 (29 Jul 2017 06:36)  BSA (m2): 2.02 (29 Jul 2017 06:36)    :    PAST MEDICAL & SURGICAL HISTORY:  Reflux  Obesity  DVT (deep venous thrombosis): 2013 neck  Diverticulitis  Hypertension  Elective surgery: abodominal wall surgery  dec 2016  Elective surgery: NOV 2016 gastric bypass revision  Gastric bypass status for obesity: gastric sleeve, 6/2012  S/P breast biopsy: 2011, left  S/P colon resection: 2011  S/P knee surgery: repair 2009, 2011  right; left  Umbilical hernia: 2000  S/P appendectomy: 1975  S/P tonsillectomy: 1967      ICU Vital Signs Last 24 Hrs  T(C): 37.7 (31 Jul 2017 06:00), Max: 38.9 (30 Jul 2017 11:00)  T(F): 99.9 (31 Jul 2017 06:00), Max: 102 (30 Jul 2017 11:00)  HR: 88 (31 Jul 2017 10:00) (78 - 807)  BP: 120/60 (31 Jul 2017 09:00) (120/60 - 120/60)  BP(mean): --  ABP: 108/60 (31 Jul 2017 10:00) (84/50 - 152/78)  ABP(mean): 78 (31 Jul 2017 10:00) (62 - 106)  RR: 17 (31 Jul 2017 09:00) (11 - 25)  SpO2: 98% (31 Jul 2017 10:00) (95% - 100%)      ABG - ( 30 Jul 2017 21:31 )  pH: 7.43  /  pCO2: 38    /  pO2: 91    / HCO3: 25    / Base Excess: 0.4   /  SaO2: 97                    30 Jul 2017 07:01  -  31 Jul 2017 07:00  --------------------------------------------------------  IN:    Fat Emulsion 20%: 66.4 mL    fentaNYL  Infusion: 180 mL    IV PiggyBack: 850 mL    norepinephrine Infusion: 574 mL    propofol Infusion: 267 mL    TPN (Total Parenteral Nutrition): 1116 mL  Total IN: 3053.4 mL    OUT:    Bulb: 10 mL    Bulb: 25 mL    Indwelling Catheter - Urethral: 1160 mL    VAC (Vacuum Assisted Closure) System: 550 mL  Total OUT: 1745 mL    Total NET: 1308.4 mL      31 Jul 2017 07:01  -  31 Jul 2017 10:46  --------------------------------------------------------  IN:    fentaNYL  Infusion: 24 mL    norepinephrine Infusion: 62 mL    propofol Infusion: 51 mL    TPN (Total Parenteral Nutrition): 100 mL  Total IN: 237 mL    OUT:    Indwelling Catheter - Urethral: 800 mL  Total OUT: 800 mL    Total NET: -563 mL          Mode: AC/ CMV (Assist Control/ Continuous Mandatory Ventilation)  RR (machine): 12  TV (machine): 400  FiO2: 40  PEEP: 5  ITime: 1  MAP: 7  PIP: 13      PHYSICAL EXAM:    GEN: pt intubated, responsive to sound and light touch, resting comfortably   HEENT: PERRL, EOMI, MMM  LUNG: CTA b/l, diminished breath sounds in the bases b/l, no w/r/r  CARDIAC: S1/S2 rrr. no murmur  GI: abd softly distended w/o rebound ttp, guarding, BS unable to be assesses 2/2 wound vac  -Abthera vac in place extedneing midline xyphoid to pubis draining 200cc serosanguinous fluid O/N. Dressing is C/D/I.   - 1 CHECO drain sutured in RLQ draining 10cc serosanguinous fluid O/N  - 1 right mediastinal drain sutured in place draining 25cc bilious fluid O/N.  : stone in place draining clear yellow urine  EXT: pulses 2+ throughout, moderate edema   NEURO: Pt intubated, responsive to sound and light touch. complete neurological exam unable to be assessed   SKIN: warm, dry, w/o rash         LABS:  CBC Full  -  ( 31 Jul 2017 06:14 )  WBC Count : 10.8 K/uL  Hemoglobin : 7.9 g/dL  Hematocrit : 22.9 %  Platelet Count - Automated : 140 K/uL  Mean Cell Volume : 85.1 fL  Mean Cell Hemoglobin : 29.4 pg  Mean Cell Hemoglobin Concentration : 34.5 g/dL      07-31    138  |  102  |  32<H>  ----------------------------<  136<H>  4.0   |  25  |  0.80    Ca    7.5<L>      31 Jul 2017 06:14  Phos  4.6     07-31  Mg     2.5     07-31      PT/INR - ( 30 Jul 2017 01:42 )   PT: 12.8 sec;   INR: 1.15          PTT - ( 30 Jul 2017 01:42 )  PTT:31.9 sec INTERVAL HPI/OVERNIGHT EVENTS:  O/N: Lactate 2.9--> 2.8, T 101.2 Tylenol given. U/A to be sent     Pt seen and examined at bedside this AM. Pt intubated, but responsive to sound and light touch. No complaints at this time.     MEDICATIONS  (STANDING):  insulin lispro (HumaLOG) corrective regimen sliding scale   SubCutaneous Before meals and at bedtime  dextrose 5%. 1000 milliLiter(s) (50 mL/Hr) IV Continuous <Continuous>  dextrose 50% Injectable 25 Gram(s) IV Push once  sodium chloride 0.9% lock flush 20 milliLiter(s) IV Push once  cyanocobalamin Injectable 1000 MICROGram(s) SubCutaneous every 7 days  dextrose 50% Injectable 12.5 Gram(s) IV Push once  linezolid  IVPB   IV Intermittent   heparin  Injectable 7500 Unit(s) SubCutaneous every 8 hours  fat emulsion 20% Infusion 50 Gram(s) (20.8 mL/Hr) IV Continuous <Continuous>  micafungin IVPB 100 milliGRAM(s) IV Intermittent every 24 hours  micafungin IVPB   IV Intermittent   linezolid  IVPB   IV Intermittent   colistimethate IVPB 150 milliGRAM(s) IV Intermittent every 12 hours  linezolid  IVPB 600 milliGRAM(s) IV Intermittent every 12 hours  propofol Infusion 1.662 MICROgram(s)/kG/Min (1 mL/Hr) IV Continuous <Continuous>  fentaNYL   Infusion 0.1 MICROgram(s)/kG/Hr (1 mL/Hr) IV Continuous <Continuous>  norepinephrine Infusion 0.005 MICROgram(s)/kG/Min (1 mL/Hr) IV Continuous <Continuous>  Parenteral Nutrition - Adult 1 Each (50 mL/Hr) TPN Continuous <Continuous>  Parenteral Nutrition - Adult 1 Each (56 mL/Hr) TPN Continuous <Continuous>  fat emulsion 20% Infusion 20 Gram(s) (8.3 mL/Hr) IV Continuous <Continuous>    MEDICATIONS  (PRN):  ondansetron Injectable 4 milliGRAM(s) IV Push every 6 hours PRN Nausea  sodium chloride 0.9% lock flush 10 milliLiter(s) IV Push every 1 hour PRN After each medication administration  sodium chloride 0.9% lock flush 10 milliLiter(s) IV Push every 12 hours PRN Lumen of catheter NOT used      Drug Dosing Weight  Height (cm): 160.02 (24 Jul 2017 06:32)  Weight (kg): 100.3 (29 Jul 2017 06:36)  BMI (kg/m2): 39.2 (29 Jul 2017 06:36)  BSA (m2): 2.02 (29 Jul 2017 06:36)    :    PAST MEDICAL & SURGICAL HISTORY:  Reflux  Obesity  DVT (deep venous thrombosis): 2013 neck  Diverticulitis  Hypertension  Elective surgery: abodominal wall surgery  dec 2016  Elective surgery: NOV 2016 gastric bypass revision  Gastric bypass status for obesity: gastric sleeve, 6/2012  S/P breast biopsy: 2011, left  S/P colon resection: 2011  S/P knee surgery: repair 2009, 2011  right; left  Umbilical hernia: 2000  S/P appendectomy: 1975  S/P tonsillectomy: 1967      ICU Vital Signs Last 24 Hrs  T(C): 37.7 (31 Jul 2017 06:00), Max: 38.9 (30 Jul 2017 11:00)  T(F): 99.9 (31 Jul 2017 06:00), Max: 102 (30 Jul 2017 11:00)  HR: 88 (31 Jul 2017 10:00) (78 - 807)  BP: 120/60 (31 Jul 2017 09:00) (120/60 - 120/60)  BP(mean): --  ABP: 108/60 (31 Jul 2017 10:00) (84/50 - 152/78)  ABP(mean): 78 (31 Jul 2017 10:00) (62 - 106)  RR: 17 (31 Jul 2017 09:00) (11 - 25)  SpO2: 98% (31 Jul 2017 10:00) (95% - 100%)      ABG - ( 30 Jul 2017 21:31 )  pH: 7.43  /  pCO2: 38    /  pO2: 91    / HCO3: 25    / Base Excess: 0.4   /  SaO2: 97                    30 Jul 2017 07:01  -  31 Jul 2017 07:00  --------------------------------------------------------  IN:    Fat Emulsion 20%: 66.4 mL    fentaNYL  Infusion: 180 mL    IV PiggyBack: 850 mL    norepinephrine Infusion: 574 mL    propofol Infusion: 267 mL    TPN (Total Parenteral Nutrition): 1116 mL  Total IN: 3053.4 mL    OUT:    Bulb: 10 mL    Bulb: 25 mL    Indwelling Catheter - Urethral: 1160 mL    VAC (Vacuum Assisted Closure) System: 550 mL  Total OUT: 1745 mL    Total NET: 1308.4 mL      31 Jul 2017 07:01  -  31 Jul 2017 10:46  --------------------------------------------------------  IN:    fentaNYL  Infusion: 24 mL    norepinephrine Infusion: 62 mL    propofol Infusion: 51 mL    TPN (Total Parenteral Nutrition): 100 mL  Total IN: 237 mL    OUT:    Indwelling Catheter - Urethral: 800 mL  Total OUT: 800 mL    Total NET: -563 mL          Mode: AC/ CMV (Assist Control/ Continuous Mandatory Ventilation)  RR (machine): 12  TV (machine): 400  FiO2: 40  PEEP: 5  ITime: 1  MAP: 7  PIP: 13      PHYSICAL EXAM:    GEN: pt intubated, responsive to sound and light touch, resting comfortably   HEENT: PERRL, EOMI, MMM  LUNG: CTA b/l, diminished breath sounds in the bases b/l, no w/r/r  CARDIAC: S1/S2 rrr. no murmur  GI: abd softly distended w/o rebound ttp, guarding, BS unable to be assesses 2/2 wound vac  -Abthera vac in place extedneing midline xyphoid to pubis draining 200cc serosanguinous fluid O/N. Dressing is C/D/I.   - 1 CHECO drain sutured in RLQ draining 10cc serosanguinous fluid O/N  - 1 right mediastinal drain sutured in place draining 25cc bilious fluid O/N.  : stone in place draining clear yellow urine  EXT/Vasc: pulses 2+ throughout, moderate edema   NEURO: Pt intubated, responsive to sound and light touch. complete neurological exam unable to be assessed   MSK: no joint swelling  SKIN: warm, dry, w/o rash         LABS:  CBC Full  -  ( 31 Jul 2017 06:14 )  WBC Count : 10.8 K/uL  Hemoglobin : 7.9 g/dL  Hematocrit : 22.9 %  Platelet Count - Automated : 140 K/uL  Mean Cell Volume : 85.1 fL  Mean Cell Hemoglobin : 29.4 pg  Mean Cell Hemoglobin Concentration : 34.5 g/dL      07-31    138  |  102  |  32<H>  ----------------------------<  136<H>  4.0   |  25  |  0.80    Ca    7.5<L>      31 Jul 2017 06:14  Phos  4.6     07-31  Mg     2.5     07-31      PT/INR - ( 30 Jul 2017 01:42 )   PT: 12.8 sec;   INR: 1.15          PTT - ( 30 Jul 2017 01:42 )  PTT:31.9 sec

## 2017-07-31 NOTE — PROGRESS NOTE ADULT - SUBJECTIVE AND OBJECTIVE BOX
patient appears very stable on vent with plan to retrun to OR for dressing change second look and possible closure tomorrow  afeb this am bun and cr are down and making urine   her vent settings are minimal with po2 of 100 on 40%  lactate to 2.4  the top drain is fine  the pig tail has relatively clear fluid that still has odor  thus still with bacteria  will send for bilirubin as this is near area of repair but looks very thin and minimal if enteric  overall does not seem septic but still major issues  area of repair showed no sign of ischemia or tension and thus very unclear why a problem would develop there  thus great risk for repeat   also remainder of bowel was very edematous and hostile  the vac stuff does not seem worrisome  discussed with icu staff and can hopefully take volume off today and allow for closure tomorrow if second look is ok

## 2017-07-31 NOTE — PROGRESS NOTE ADULT - ASSESSMENT
57 F s/p  SBR resection, fistula resection, esophagojejunostomy revision w/ ATN.    Neuro: propofol, fentanyl gtt  CV: maintain  MAPs of 65, levophed gtt  Resp: /40/12/5, Will consider diagnostic aspiration of left pleural effusion  GI/FEN: strict NPO, TPN/lipids,  Vit B12, albumin 25%q8h  : Yu  ID: linezolid (7/25--), micafungin (7/29-), colistin (7/29-) ///dc: Perioperative Gent/Vanc  Heme/PPX: SCDs, SQH  Lines: Radha (7/24--), PIVs, L IJ TLC (7/30--)  Drains: Right mediastinal CHECO in Right abdomen, IR drain  Wounds: Midline xiphoid to pubis with Abthera vac in place 57 F s/p  SBR resection, fistula resection, esophagojejunostomy revision w/ ATN, acute respiratory failure, septic shock.    Neuro: propofol, fentanyl gtt  CV: maintain  MAPs of 65, levophed gtt; will try to diurese given edema and PVC on CXR. Perfusion otherwise appears acceptable.  Resp: /40/12/5, Will consider diagnostic aspiration of left pleural effusion  GI/FEN: strict NPO, TPN/lipids,  Vit B12, albumin 25%q8h  : Yu; renal function improved  ID: linezolid (7/25--), micafungin (7/29-), colistin (7/29-) ///dc: Perioperative Gent/Vanc. Current cultures with sensitive E. coli; if no other positive cultures may be able to deescalate antibiotics.  Heme/PPX: SCDs, SQH  Lines: Radha (7/24--), PIVs, L IJ TLC (7/30--)  Drains: Right mediastinal CHECO in Right abdomen, IR drain  Wounds: Midline xiphoid to pubis with Abthera vac in place

## 2017-07-31 NOTE — PROGRESS NOTE ADULT - ASSESSMENT
Patient is current intubated.  There is pleural effusion at right. A recent CT scan of abd/pelvis demonstrates no discrete evidence of oral contrast leakage at level of anastomosis.  Additionally, there is 16x5cm collection along the anterior abdominal wall with air-fluid level; this requires further evaluation.  There is suspicious postoperative hematoma identified and also required further monitor.  A catheter is in place.

## 2017-07-31 NOTE — PROGRESS NOTE ADULT - SUBJECTIVE AND OBJECTIVE BOX
INTERVAL HPI/OVERNIGHT EVENTS:  O/N: Lactate 2.9--> 2.8, T 101.2 Tylenol given. U/A to be sent     Pt seen and examined at bedside this AM. Pt intubated, but responsive to sound and light touch. No complaints at this time.     MEDICATIONS  (STANDING):  insulin lispro (HumaLOG) corrective regimen sliding scale   SubCutaneous Before meals and at bedtime  dextrose 5%. 1000 milliLiter(s) (50 mL/Hr) IV Continuous <Continuous>  dextrose 50% Injectable 25 Gram(s) IV Push once  sodium chloride 0.9% lock flush 20 milliLiter(s) IV Push once  cyanocobalamin Injectable 1000 MICROGram(s) SubCutaneous every 7 days  dextrose 50% Injectable 12.5 Gram(s) IV Push once  linezolid  IVPB   IV Intermittent   heparin  Injectable 7500 Unit(s) SubCutaneous every 8 hours  fat emulsion 20% Infusion 50 Gram(s) (20.8 mL/Hr) IV Continuous <Continuous>  micafungin IVPB 100 milliGRAM(s) IV Intermittent every 24 hours  micafungin IVPB   IV Intermittent   linezolid  IVPB   IV Intermittent   colistimethate IVPB 150 milliGRAM(s) IV Intermittent every 12 hours  linezolid  IVPB 600 milliGRAM(s) IV Intermittent every 12 hours  propofol Infusion 1.662 MICROgram(s)/kG/Min (1 mL/Hr) IV Continuous <Continuous>  fentaNYL   Infusion 0.1 MICROgram(s)/kG/Hr (1 mL/Hr) IV Continuous <Continuous>  norepinephrine Infusion 0.005 MICROgram(s)/kG/Min (1 mL/Hr) IV Continuous <Continuous>  Parenteral Nutrition - Adult 1 Each (50 mL/Hr) TPN Continuous <Continuous>  Parenteral Nutrition - Adult 1 Each (56 mL/Hr) TPN Continuous <Continuous>  fat emulsion 20% Infusion 20 Gram(s) (8.3 mL/Hr) IV Continuous <Continuous>    MEDICATIONS  (PRN):  ondansetron Injectable 4 milliGRAM(s) IV Push every 6 hours PRN Nausea  sodium chloride 0.9% lock flush 10 milliLiter(s) IV Push every 1 hour PRN After each medication administration  sodium chloride 0.9% lock flush 10 milliLiter(s) IV Push every 12 hours PRN Lumen of catheter NOT used      Drug Dosing Weight  Height (cm): 160.02 (24 Jul 2017 06:32)  Weight (kg): 100.3 (29 Jul 2017 06:36)  BMI (kg/m2): 39.2 (29 Jul 2017 06:36)  BSA (m2): 2.02 (29 Jul 2017 06:36)    :    PAST MEDICAL & SURGICAL HISTORY:  Reflux  Obesity  DVT (deep venous thrombosis): 2013 neck  Diverticulitis  Hypertension  Elective surgery: abodominal wall surgery  dec 2016  Elective surgery: NOV 2016 gastric bypass revision  Gastric bypass status for obesity: gastric sleeve, 6/2012  S/P breast biopsy: 2011, left  S/P colon resection: 2011  S/P knee surgery: repair 2009, 2011  right; left  Umbilical hernia: 2000  S/P appendectomy: 1975  S/P tonsillectomy: 1967      ICU Vital Signs Last 24 Hrs  T(C): 37.7 (31 Jul 2017 06:00), Max: 38.9 (30 Jul 2017 11:00)  T(F): 99.9 (31 Jul 2017 06:00), Max: 102 (30 Jul 2017 11:00)  HR: 88 (31 Jul 2017 10:00) (78 - 807)  BP: 120/60 (31 Jul 2017 09:00) (120/60 - 120/60)  BP(mean): --  ABP: 108/60 (31 Jul 2017 10:00) (84/50 - 152/78)  ABP(mean): 78 (31 Jul 2017 10:00) (62 - 106)  RR: 17 (31 Jul 2017 09:00) (11 - 25)  SpO2: 98% (31 Jul 2017 10:00) (95% - 100%)      ABG - ( 30 Jul 2017 21:31 )  pH: 7.43  /  pCO2: 38    /  pO2: 91    / HCO3: 25    / Base Excess: 0.4   /  SaO2: 97                    30 Jul 2017 07:01  -  31 Jul 2017 07:00  --------------------------------------------------------  IN:    Fat Emulsion 20%: 66.4 mL    fentaNYL  Infusion: 180 mL    IV PiggyBack: 850 mL    norepinephrine Infusion: 574 mL    propofol Infusion: 267 mL    TPN (Total Parenteral Nutrition): 1116 mL  Total IN: 3053.4 mL    OUT:    Bulb: 10 mL    Bulb: 25 mL    Indwelling Catheter - Urethral: 1160 mL    VAC (Vacuum Assisted Closure) System: 550 mL  Total OUT: 1745 mL    Total NET: 1308.4 mL      31 Jul 2017 07:01  -  31 Jul 2017 10:46  --------------------------------------------------------  IN:    fentaNYL  Infusion: 24 mL    norepinephrine Infusion: 62 mL    propofol Infusion: 51 mL    TPN (Total Parenteral Nutrition): 100 mL  Total IN: 237 mL    OUT:    Indwelling Catheter - Urethral: 800 mL  Total OUT: 800 mL    Total NET: -563 mL          Mode: AC/ CMV (Assist Control/ Continuous Mandatory Ventilation)  RR (machine): 12  TV (machine): 400  FiO2: 40  PEEP: 5  ITime: 1  MAP: 7  PIP: 13      PHYSICAL EXAM:    GEN: pt intubated, responsive to sound and light touch, resting comfortably   HEENT: PERRL, EOMI, MMM  LUNG: CTA b/l, no wheezing, rhonchi, rales   CARDIAC: S1/S2 rrr. no murmur  GI: abd softly distended w/o rebound tedneress, guarding, BS unable to be assesses 2/2 wound vac  -Abthera vac in place extedneing midline xyphoid to pubis draining 200cc serosanguinous fluid O/N. Dressing is C/D/I.   - 1 CHECO drain sutured in RLQ draining 10cc serosanguinous fluid O/N  - 1 right mediastinal drain sutured in place draining 25cc bilious fluid O/N.  : stone in place draining yellow fluid  EXT: pulses 2+ throughout, minimal edema   NEURO: Pt intubated, responsive to sound and light touch. complete neurological exam unable to be assessed   SKIN: warm, dry, w/o rash         LABS:  CBC Full  -  ( 31 Jul 2017 06:14 )  WBC Count : 10.8 K/uL  Hemoglobin : 7.9 g/dL  Hematocrit : 22.9 %  Platelet Count - Automated : 140 K/uL  Mean Cell Volume : 85.1 fL  Mean Cell Hemoglobin : 29.4 pg  Mean Cell Hemoglobin Concentration : 34.5 g/dL      07-31    138  |  102  |  32<H>  ----------------------------<  136<H>  4.0   |  25  |  0.80    Ca    7.5<L>      31 Jul 2017 06:14  Phos  4.6     07-31  Mg     2.5     07-31      PT/INR - ( 30 Jul 2017 01:42 )   PT: 12.8 sec;   INR: 1.15          PTT - ( 30 Jul 2017 01:42 )  PTT:31.9 sec  :    CRITICAL CARE TIME SPENT: INTERVAL HPI/OVERNIGHT EVENTS:  O/N: Lactate 2.9--> 2.8, T 101.2 Tylenol given. U/A to be sent     Pt seen and examined at bedside this AM. Pt intubated, but responsive to sound and light touch. No complaints at this time.     MEDICATIONS  (STANDING):  insulin lispro (HumaLOG) corrective regimen sliding scale   SubCutaneous Before meals and at bedtime  dextrose 5%. 1000 milliLiter(s) (50 mL/Hr) IV Continuous <Continuous>  dextrose 50% Injectable 25 Gram(s) IV Push once  sodium chloride 0.9% lock flush 20 milliLiter(s) IV Push once  cyanocobalamin Injectable 1000 MICROGram(s) SubCutaneous every 7 days  dextrose 50% Injectable 12.5 Gram(s) IV Push once  linezolid  IVPB   IV Intermittent   heparin  Injectable 7500 Unit(s) SubCutaneous every 8 hours  fat emulsion 20% Infusion 50 Gram(s) (20.8 mL/Hr) IV Continuous <Continuous>  micafungin IVPB 100 milliGRAM(s) IV Intermittent every 24 hours  micafungin IVPB   IV Intermittent   linezolid  IVPB   IV Intermittent   colistimethate IVPB 150 milliGRAM(s) IV Intermittent every 12 hours  linezolid  IVPB 600 milliGRAM(s) IV Intermittent every 12 hours  propofol Infusion 1.662 MICROgram(s)/kG/Min (1 mL/Hr) IV Continuous <Continuous>  fentaNYL   Infusion 0.1 MICROgram(s)/kG/Hr (1 mL/Hr) IV Continuous <Continuous>  norepinephrine Infusion 0.005 MICROgram(s)/kG/Min (1 mL/Hr) IV Continuous <Continuous>  Parenteral Nutrition - Adult 1 Each (50 mL/Hr) TPN Continuous <Continuous>  Parenteral Nutrition - Adult 1 Each (56 mL/Hr) TPN Continuous <Continuous>  fat emulsion 20% Infusion 20 Gram(s) (8.3 mL/Hr) IV Continuous <Continuous>    MEDICATIONS  (PRN):  ondansetron Injectable 4 milliGRAM(s) IV Push every 6 hours PRN Nausea  sodium chloride 0.9% lock flush 10 milliLiter(s) IV Push every 1 hour PRN After each medication administration  sodium chloride 0.9% lock flush 10 milliLiter(s) IV Push every 12 hours PRN Lumen of catheter NOT used      Drug Dosing Weight  Height (cm): 160.02 (24 Jul 2017 06:32)  Weight (kg): 100.3 (29 Jul 2017 06:36)  BMI (kg/m2): 39.2 (29 Jul 2017 06:36)  BSA (m2): 2.02 (29 Jul 2017 06:36)    :    PAST MEDICAL & SURGICAL HISTORY:  Reflux  Obesity  DVT (deep venous thrombosis): 2013 neck  Diverticulitis  Hypertension  Elective surgery: abodominal wall surgery  dec 2016  Elective surgery: NOV 2016 gastric bypass revision  Gastric bypass status for obesity: gastric sleeve, 6/2012  S/P breast biopsy: 2011, left  S/P colon resection: 2011  S/P knee surgery: repair 2009, 2011  right; left  Umbilical hernia: 2000  S/P appendectomy: 1975  S/P tonsillectomy: 1967      ICU Vital Signs Last 24 Hrs  T(C): 37.7 (31 Jul 2017 06:00), Max: 38.9 (30 Jul 2017 11:00)  T(F): 99.9 (31 Jul 2017 06:00), Max: 102 (30 Jul 2017 11:00)  HR: 88 (31 Jul 2017 10:00) (78 - 807)  BP: 120/60 (31 Jul 2017 09:00) (120/60 - 120/60)  BP(mean): --  ABP: 108/60 (31 Jul 2017 10:00) (84/50 - 152/78)  ABP(mean): 78 (31 Jul 2017 10:00) (62 - 106)  RR: 17 (31 Jul 2017 09:00) (11 - 25)  SpO2: 98% (31 Jul 2017 10:00) (95% - 100%)      ABG - ( 30 Jul 2017 21:31 )  pH: 7.43  /  pCO2: 38    /  pO2: 91    / HCO3: 25    / Base Excess: 0.4   /  SaO2: 97                    30 Jul 2017 07:01  -  31 Jul 2017 07:00  --------------------------------------------------------  IN:    Fat Emulsion 20%: 66.4 mL    fentaNYL  Infusion: 180 mL    IV PiggyBack: 850 mL    norepinephrine Infusion: 574 mL    propofol Infusion: 267 mL    TPN (Total Parenteral Nutrition): 1116 mL  Total IN: 3053.4 mL    OUT:    Bulb: 10 mL    Bulb: 25 mL    Indwelling Catheter - Urethral: 1160 mL    VAC (Vacuum Assisted Closure) System: 550 mL  Total OUT: 1745 mL    Total NET: 1308.4 mL      31 Jul 2017 07:01  -  31 Jul 2017 10:46  --------------------------------------------------------  IN:    fentaNYL  Infusion: 24 mL    norepinephrine Infusion: 62 mL    propofol Infusion: 51 mL    TPN (Total Parenteral Nutrition): 100 mL  Total IN: 237 mL    OUT:    Indwelling Catheter - Urethral: 800 mL  Total OUT: 800 mL    Total NET: -563 mL          Mode: AC/ CMV (Assist Control/ Continuous Mandatory Ventilation)  RR (machine): 12  TV (machine): 400  FiO2: 40  PEEP: 5  ITime: 1  MAP: 7  PIP: 13      PHYSICAL EXAM:    GEN: pt intubated, responsive to sound and light touch, resting comfortably   HEENT: PERRL, EOMI, MMM  LUNG: CTA b/l, diminished breath sounds in the bases b/l, no w/r/r  CARDIAC: S1/S2 rrr. no murmur  GI: abd softly distended w/o rebound ttp, guarding, BS unable to be assesses 2/2 wound vac  -Abthera vac in place extedneing midline xyphoid to pubis draining 200cc serosanguinous fluid O/N. Dressing is C/D/I.   - 1 CHECO drain sutured in RLQ draining 10cc serosanguinous fluid O/N  - 1 right mediastinal drain sutured in place draining 25cc bilious fluid O/N.  : stone in place draining clear yellow urine  EXT: pulses 2+ throughout, moderate edema   NEURO: Pt intubated, responsive to sound and light touch. complete neurological exam unable to be assessed   SKIN: warm, dry, w/o rash         LABS:  CBC Full  -  ( 31 Jul 2017 06:14 )  WBC Count : 10.8 K/uL  Hemoglobin : 7.9 g/dL  Hematocrit : 22.9 %  Platelet Count - Automated : 140 K/uL  Mean Cell Volume : 85.1 fL  Mean Cell Hemoglobin : 29.4 pg  Mean Cell Hemoglobin Concentration : 34.5 g/dL      07-31    138  |  102  |  32<H>  ----------------------------<  136<H>  4.0   |  25  |  0.80    Ca    7.5<L>      31 Jul 2017 06:14  Phos  4.6     07-31  Mg     2.5     07-31      PT/INR - ( 30 Jul 2017 01:42 )   PT: 12.8 sec;   INR: 1.15          PTT - ( 30 Jul 2017 01:42 )  PTT:31.9 sec  :

## 2017-07-31 NOTE — PROGRESS NOTE ADULT - SUBJECTIVE AND OBJECTIVE BOX
IR Progress Note. Pt is 58 y/o F who had RLQ abdominal drain placed by IR 7/30 for fluid collection. Pt is on ventilator. Drain has output of 10cc/ 24 hr serous/brown fluid. Flushed easily w 2cc NS.

## 2017-07-31 NOTE — PROGRESS NOTE ADULT - SUBJECTIVE AND OBJECTIVE BOX
< from: CT Abdomen and Pelvis w/ Oral Cont (07.29.17 @ 13:20) >  1.Status post gastrectomy with revision of esophagojejunal anastomosis   in the upper abdomen with  oral contrast in place demonstrating no discrete evidence of oral   contrast leakage at this level.  2. 16.0 x 5.0 cm fluid collection along the anterior abdominal wall with   air-fluid level. This may be  postoperative though developing abscess collection not excluded.  3. Anastomosis seen in the left lower quadrant abdomen with adjacent   hyperdense fluid as well as  additional adjacent central mesenteric fluid collection measuring up to   14.6 x 4.4 cm. This may be  related to postoperative hematoma though anastomotic leak not excluded in   this region at the  anastomosis.  4. Upper abdominal and mid abdominal drainage catheters in place.    < end of copied text > History of present illness:    Patient is current intubated.  The recent CT scan of abd/pelvis demonstrates no discrete evidence of oral contrast leakage at level of anastomosis.  Additionally, there is 16x5cm collection along the anterior abdominal wall with air-fluid level; this requires further evaluation.  There is suspicious postoperative hematoma identified and also required further monitor.  A catheter is in place.      Vital Signs Last 24 Hrs  T(C): 37.7 (31 Jul 2017 06:00), Max: 38.4 (31 Jul 2017 02:00)  T(F): 99.9 (31 Jul 2017 06:00), Max: 101.2 (31 Jul 2017 02:00)  HR: 88 (31 Jul 2017 12:00) (78 - 807)  BP: 120/60 (31 Jul 2017 09:00) (120/60 - 120/60)  BP(mean): --  RR: 17 (31 Jul 2017 09:00) (11 - 25)  SpO2: 93% (31 Jul 2017 12:00) (93% - 100%)  I&O's Detail    30 Jul 2017 07:01  -  31 Jul 2017 07:00  --------------------------------------------------------  IN:    Fat Emulsion 20%: 66.4 mL    fentaNYL  Infusion: 180 mL    IV PiggyBack: 850 mL    norepinephrine Infusion: 574 mL    propofol Infusion: 267 mL    TPN (Total Parenteral Nutrition): 1116 mL  Total IN: 3053.4 mL    OUT:    Bulb: 10 mL    Bulb: 25 mL    Indwelling Catheter - Urethral: 1160 mL    VAC (Vacuum Assisted Closure) System: 550 mL  Total OUT: 1745 mL    Total NET: 1308.4 mL      31 Jul 2017 07:01  -  31 Jul 2017 12:37  --------------------------------------------------------  IN:    fentaNYL  Infusion: 40 mL    norepinephrine Infusion: 100 mL    propofol Infusion: 85 mL    TPN (Total Parenteral Nutrition): 200 mL  Total IN: 425 mL    OUT:    Indwelling Catheter - Urethral: 1150 mL  Total OUT: 1150 mL    Total NET: -725 mL      LABS:                        7.9    10.8  )-----------( 140      ( 31 Jul 2017 06:14 )             22.9       PT/INR - ( 30 Jul 2017 01:42 )   PT: 12.8 sec;   INR: 1.15     PTT - ( 30 Jul 2017 01:42 )  PTT:31.9 sec    07-31    138  |  102  |  32<H>  ----------------------------<  136<H>  4.0   |  25  |  0.80    Ca    7.5<L>      31 Jul 2017 06:14  Phos  4.6     07-31  Mg     2.5     07-31    MEDICATIONS  (STANDING):  insulin lispro (HumaLOG) corrective regimen sliding scale   SubCutaneous Before meals and at bedtime  dextrose 5%. 1000 milliLiter(s) (50 mL/Hr) IV Continuous <Continuous>  dextrose 50% Injectable 25 Gram(s) IV Push once  sodium chloride 0.9% lock flush 20 milliLiter(s) IV Push once  cyanocobalamin Injectable 1000 MICROGram(s) SubCutaneous every 7 days  dextrose 50% Injectable 12.5 Gram(s) IV Push once  linezolid  IVPB   IV Intermittent   heparin  Injectable 7500 Unit(s) SubCutaneous every 8 hours  fat emulsion 20% Infusion 50 Gram(s) (20.8 mL/Hr) IV Continuous <Continuous>  micafungin IVPB 100 milliGRAM(s) IV Intermittent every 24 hours  micafungin IVPB   IV Intermittent   linezolid  IVPB   IV Intermittent   colistimethate IVPB 150 milliGRAM(s) IV Intermittent every 12 hours  linezolid  IVPB 600 milliGRAM(s) IV Intermittent every 12 hours  propofol Infusion 1.662 MICROgram(s)/kG/Min (1 mL/Hr) IV Continuous <Continuous>  fentaNYL   Infusion 0.1 MICROgram(s)/kG/Hr (1 mL/Hr) IV Continuous <Continuous>  norepinephrine Infusion 0.005 MICROgram(s)/kG/Min (1 mL/Hr) IV Continuous <Continuous>  Parenteral Nutrition - Adult 1 Each (50 mL/Hr) TPN Continuous <Continuous>  Parenteral Nutrition - Adult 1 Each (56 mL/Hr) TPN Continuous <Continuous>  fat emulsion 20% Infusion 20 Gram(s) (8.3 mL/Hr) IV Continuous <Continuous>    MEDICATIONS  (PRN):  ondansetron Injectable 4 milliGRAM(s) IV Push every 6 hours PRN Nausea  sodium chloride 0.9% lock flush 10 milliLiter(s) IV Push every 1 hour PRN After each medication administration  sodium chloride 0.9% lock flush 10 milliLiter(s) IV Push every 12 hours PRN Lumen of catheter NOT used        RADIOLOGY & ADDITIONAL TESTS:  CXR  Right pleural effusion, new from the prior exam.    CT   1.Status post gastrectomy with revision of esophagojejunal anastomosis   in the upper abdomen with  oral contrast in place demonstrating no discrete evidence of oral   contrast leakage at this level.  2. 16.0 x 5.0 cm fluid collection along the anterior abdominal wall with   air-fluid level. This may be  postoperative though developing abscess collection not excluded.  3. Anastomosis seen in the left lower quadrant abdomen with adjacent   hyperdense fluid as well as  additional adjacent central mesenteric fluid collection measuring up to   14.6 x 4.4 cm. This may be  related to postoperative hematoma though anastomotic leak not excluded in   this region at the  anastomosis.  4. Upper abdominal and mid abdominal drainage catheters in place.

## 2017-07-31 NOTE — PROGRESS NOTE ADULT - ASSESSMENT
57 F s/p  SBR resection, fistula resection, esophagojejunostomy revision w/ ATN.    Neuro: propofol, fentanyl gtt  CV: maintain  MAPs of 65, levophed gtt  Resp: /40/12/5  GI/FEN: strict NPO, TPN/lipids,  Vit B12, albumin 25%q8h  : Yu  ID: linezolid (7/25--), micafungin (7/29-), colistin (7/29-) ///dc: Perioperative Gent/Vanc  Heme/PPX: SCDs for now, SQH  Lines: [place] (7/24--), PIVs, L IJ TLC (7/30--)  Drains: Right mediastinal CHECO in Right abdomen  Wounds: Midline xiphoid to pubis with Abthera vac in place 57 F s/p  SBR resection, fistula resection, esophagojejunostomy revision w/ ATN.    Neuro: propofol, fentanyl gtt  CV: maintain  MAPs of 65, levophed gtt  Resp: /40/12/5, Will consider diagnostic aspiration of left pleural effusion  GI/FEN: strict NPO, TPN/lipids,  Vit B12, albumin 25%q8h  : Yu  ID: linezolid (7/25--), micafungin (7/29-), colistin (7/29-) ///dc: Perioperative Gent/Vanc  Heme/PPX: SCDs, SQH  Lines: [place] (7/24--), PIVs, L IJ TLC (7/30--)  Drains: Right mediastinal CHECO in Right abdomen, IR drain  Wounds: Midline xiphoid to pubis with Abthera vac in place

## 2017-08-01 LAB
ALBUMIN SERPL ELPH-MCNC: 1.3 G/DL — LOW (ref 3.3–5)
ALP SERPL-CCNC: 87 U/L — SIGNIFICANT CHANGE UP (ref 40–120)
ALT FLD-CCNC: 10 U/L — SIGNIFICANT CHANGE UP (ref 10–45)
ANION GAP SERPL CALC-SCNC: 11 MMOL/L — SIGNIFICANT CHANGE UP (ref 5–17)
ANION GAP SERPL CALC-SCNC: 12 MMOL/L — SIGNIFICANT CHANGE UP (ref 5–17)
ANION GAP SERPL CALC-SCNC: 13 MMOL/L — SIGNIFICANT CHANGE UP (ref 5–17)
APTT BLD: 31.8 SEC — SIGNIFICANT CHANGE UP (ref 27.5–37.4)
AST SERPL-CCNC: 15 U/L — SIGNIFICANT CHANGE UP (ref 10–40)
BASE EXCESS BLDA CALC-SCNC: 0.3 MMOL/L — SIGNIFICANT CHANGE UP (ref -2–3)
BASE EXCESS BLDA CALC-SCNC: 2.4 MMOL/L — SIGNIFICANT CHANGE UP (ref -2–3)
BILIRUB DIRECT SERPL-MCNC: 0.3 MG/DL — HIGH (ref 0–0.2)
BILIRUB FLD-MCNC: 1.2 MG/DL — SIGNIFICANT CHANGE UP
BILIRUB INDIRECT FLD-MCNC: 0.3 MG/DL — SIGNIFICANT CHANGE UP (ref 0.2–1)
BILIRUB SERPL-MCNC: 0.6 MG/DL — SIGNIFICANT CHANGE UP (ref 0.2–1.2)
BUN SERPL-MCNC: 27 MG/DL — HIGH (ref 7–23)
BUN SERPL-MCNC: 28 MG/DL — HIGH (ref 7–23)
BUN SERPL-MCNC: 29 MG/DL — HIGH (ref 7–23)
CALCIUM SERPL-MCNC: 7.2 MG/DL — LOW (ref 8.4–10.5)
CALCIUM SERPL-MCNC: 7.5 MG/DL — LOW (ref 8.4–10.5)
CALCIUM SERPL-MCNC: 7.9 MG/DL — LOW (ref 8.4–10.5)
CHLORIDE SERPL-SCNC: 102 MMOL/L — SIGNIFICANT CHANGE UP (ref 96–108)
CHLORIDE SERPL-SCNC: 103 MMOL/L — SIGNIFICANT CHANGE UP (ref 96–108)
CHLORIDE SERPL-SCNC: 99 MMOL/L — SIGNIFICANT CHANGE UP (ref 96–108)
CK SERPL-CCNC: 35 U/L — SIGNIFICANT CHANGE UP (ref 25–170)
CO2 SERPL-SCNC: 25 MMOL/L — SIGNIFICANT CHANGE UP (ref 22–31)
CO2 SERPL-SCNC: 25 MMOL/L — SIGNIFICANT CHANGE UP (ref 22–31)
CO2 SERPL-SCNC: 28 MMOL/L — SIGNIFICANT CHANGE UP (ref 22–31)
CREAT SERPL-MCNC: 0.7 MG/DL — SIGNIFICANT CHANGE UP (ref 0.5–1.3)
CREAT SERPL-MCNC: 0.9 MG/DL — SIGNIFICANT CHANGE UP (ref 0.5–1.3)
CREAT SERPL-MCNC: 0.9 MG/DL — SIGNIFICANT CHANGE UP (ref 0.5–1.3)
GAS PNL BLDA: SIGNIFICANT CHANGE UP
GAS PNL BLDA: SIGNIFICANT CHANGE UP
GAS PNL BLDV: SIGNIFICANT CHANGE UP
GAS PNL BLDV: SIGNIFICANT CHANGE UP
GLUCOSE SERPL-MCNC: 102 MG/DL — HIGH (ref 70–99)
GLUCOSE SERPL-MCNC: 117 MG/DL — HIGH (ref 70–99)
GLUCOSE SERPL-MCNC: 122 MG/DL — HIGH (ref 70–99)
GRAM STN FLD: SIGNIFICANT CHANGE UP
HCO3 BLDA-SCNC: 26 MMOL/L — SIGNIFICANT CHANGE UP (ref 21–28)
HCO3 BLDA-SCNC: 27 MMOL/L — SIGNIFICANT CHANGE UP (ref 21–28)
HCT VFR BLD CALC: 24.7 % — LOW (ref 34.5–45)
HCT VFR BLD CALC: 29.5 % — LOW (ref 34.5–45)
HCT VFR BLD CALC: 31.1 % — LOW (ref 34.5–45)
HGB BLD-MCNC: 10.5 G/DL — LOW (ref 11.5–15.5)
HGB BLD-MCNC: 7.9 G/DL — LOW (ref 11.5–15.5)
HGB BLD-MCNC: 9.5 G/DL — LOW (ref 11.5–15.5)
INR BLD: 1.16 — SIGNIFICANT CHANGE UP (ref 0.88–1.16)
LACTATE SERPL-SCNC: 1.9 MMOL/L — SIGNIFICANT CHANGE UP (ref 0.5–2)
LACTATE SERPL-SCNC: 2.4 MMOL/L — HIGH (ref 0.5–2)
LACTATE SERPL-SCNC: 2.5 MMOL/L — HIGH (ref 0.5–2)
LDH SERPL L TO P-CCNC: 240 U/L — SIGNIFICANT CHANGE UP (ref 50–242)
MAGNESIUM SERPL-MCNC: 2 MG/DL — SIGNIFICANT CHANGE UP (ref 1.6–2.6)
MAGNESIUM SERPL-MCNC: 2.2 MG/DL — SIGNIFICANT CHANGE UP (ref 1.6–2.6)
MCHC RBC-ENTMCNC: 28.5 PG — SIGNIFICANT CHANGE UP (ref 27–34)
MCHC RBC-ENTMCNC: 28.6 PG — SIGNIFICANT CHANGE UP (ref 27–34)
MCHC RBC-ENTMCNC: 29.2 PG — SIGNIFICANT CHANGE UP (ref 27–34)
MCHC RBC-ENTMCNC: 32 G/DL — SIGNIFICANT CHANGE UP (ref 32–36)
MCHC RBC-ENTMCNC: 32.2 G/DL — SIGNIFICANT CHANGE UP (ref 32–36)
MCHC RBC-ENTMCNC: 33.8 G/DL — SIGNIFICANT CHANGE UP (ref 32–36)
MCV RBC AUTO: 86.4 FL — SIGNIFICANT CHANGE UP (ref 80–100)
MCV RBC AUTO: 88.6 FL — SIGNIFICANT CHANGE UP (ref 80–100)
MCV RBC AUTO: 89.5 FL — SIGNIFICANT CHANGE UP (ref 80–100)
PCO2 BLDA: 44 MMHG — SIGNIFICANT CHANGE UP (ref 32–45)
PCO2 BLDA: 47 MMHG — HIGH (ref 32–45)
PH BLDA: 7.37 — SIGNIFICANT CHANGE UP (ref 7.35–7.45)
PH BLDA: 7.42 — SIGNIFICANT CHANGE UP (ref 7.35–7.45)
PHOSPHATE SERPL-MCNC: 5.7 MG/DL — HIGH (ref 2.5–4.5)
PHOSPHATE SERPL-MCNC: 5.9 MG/DL — HIGH (ref 2.5–4.5)
PLATELET # BLD AUTO: 153 K/UL — SIGNIFICANT CHANGE UP (ref 150–400)
PLATELET # BLD AUTO: 163 K/UL — SIGNIFICANT CHANGE UP (ref 150–400)
PLATELET # BLD AUTO: 182 K/UL — SIGNIFICANT CHANGE UP (ref 150–400)
PO2 BLDA: 80 MMHG — LOW (ref 83–108)
PO2 BLDA: 95 MMHG — SIGNIFICANT CHANGE UP (ref 83–108)
POTASSIUM SERPL-MCNC: 4 MMOL/L — SIGNIFICANT CHANGE UP (ref 3.5–5.3)
POTASSIUM SERPL-MCNC: 4.4 MMOL/L — SIGNIFICANT CHANGE UP (ref 3.5–5.3)
POTASSIUM SERPL-MCNC: 4.8 MMOL/L — SIGNIFICANT CHANGE UP (ref 3.5–5.3)
POTASSIUM SERPL-SCNC: 4 MMOL/L — SIGNIFICANT CHANGE UP (ref 3.5–5.3)
POTASSIUM SERPL-SCNC: 4.4 MMOL/L — SIGNIFICANT CHANGE UP (ref 3.5–5.3)
POTASSIUM SERPL-SCNC: 4.8 MMOL/L — SIGNIFICANT CHANGE UP (ref 3.5–5.3)
PROT SERPL-MCNC: 4.7 G/DL — LOW (ref 6–8.3)
PROTHROM AB SERPL-ACNC: 12.9 SEC — HIGH (ref 9.8–12.7)
RBC # BLD: 2.76 M/UL — LOW (ref 3.8–5.2)
RBC # BLD: 3.33 M/UL — LOW (ref 3.8–5.2)
RBC # BLD: 3.6 M/UL — LOW (ref 3.8–5.2)
RBC # FLD: 15.6 % — SIGNIFICANT CHANGE UP (ref 10.3–16.9)
RBC # FLD: 15.7 % — SIGNIFICANT CHANGE UP (ref 10.3–16.9)
RBC # FLD: 16.2 % — SIGNIFICANT CHANGE UP (ref 10.3–16.9)
SAO2 % BLDA: 95 % — SIGNIFICANT CHANGE UP (ref 95–100)
SAO2 % BLDA: 97 % — SIGNIFICANT CHANGE UP (ref 95–100)
SODIUM SERPL-SCNC: 138 MMOL/L — SIGNIFICANT CHANGE UP (ref 135–145)
SODIUM SERPL-SCNC: 140 MMOL/L — SIGNIFICANT CHANGE UP (ref 135–145)
SODIUM SERPL-SCNC: 140 MMOL/L — SIGNIFICANT CHANGE UP (ref 135–145)
SPECIMEN SOURCE: SIGNIFICANT CHANGE UP
WBC # BLD: 10.2 K/UL — SIGNIFICANT CHANGE UP (ref 3.8–10.5)
WBC # BLD: 10.9 K/UL — HIGH (ref 3.8–10.5)
WBC # BLD: 12 K/UL — HIGH (ref 3.8–10.5)
WBC # FLD AUTO: 10.2 K/UL — SIGNIFICANT CHANGE UP (ref 3.8–10.5)
WBC # FLD AUTO: 10.9 K/UL — HIGH (ref 3.8–10.5)
WBC # FLD AUTO: 12 K/UL — HIGH (ref 3.8–10.5)

## 2017-08-01 PROCEDURE — 49560: CPT | Mod: 78

## 2017-08-01 PROCEDURE — 99291 CRITICAL CARE FIRST HOUR: CPT

## 2017-08-01 PROCEDURE — 71010: CPT | Mod: 26,76

## 2017-08-01 RX ORDER — ALBUMIN HUMAN 25 %
500 VIAL (ML) INTRAVENOUS ONCE
Qty: 0 | Refills: 0 | Status: COMPLETED | OUTPATIENT
Start: 2017-08-01 | End: 2017-08-01

## 2017-08-01 RX ORDER — HYDROCORTISONE 20 MG
50 TABLET ORAL EVERY 6 HOURS
Qty: 0 | Refills: 0 | Status: DISCONTINUED | OUTPATIENT
Start: 2017-08-01 | End: 2017-08-02

## 2017-08-01 RX ORDER — I.V. FAT EMULSION 20 G/100ML
20 EMULSION INTRAVENOUS
Qty: 0 | Refills: 0 | Status: DISCONTINUED | OUTPATIENT
Start: 2017-08-01 | End: 2017-08-01

## 2017-08-01 RX ORDER — DEXTROSE 50 % IN WATER 50 %
12.5 SYRINGE (ML) INTRAVENOUS ONCE
Qty: 0 | Refills: 0 | Status: COMPLETED | OUTPATIENT
Start: 2017-08-01 | End: 2017-08-01

## 2017-08-01 RX ORDER — PIPERACILLIN AND TAZOBACTAM 4; .5 G/20ML; G/20ML
3.38 INJECTION, POWDER, LYOPHILIZED, FOR SOLUTION INTRAVENOUS EVERY 6 HOURS
Qty: 0 | Refills: 0 | Status: DISCONTINUED | OUTPATIENT
Start: 2017-08-01 | End: 2017-08-11

## 2017-08-01 RX ORDER — NYSTATIN CREAM 100000 [USP'U]/G
1 CREAM TOPICAL
Qty: 0 | Refills: 0 | Status: DISCONTINUED | OUTPATIENT
Start: 2017-08-01 | End: 2017-09-26

## 2017-08-01 RX ORDER — ELECTROLYTE SOLUTION,INJ
1 VIAL (ML) INTRAVENOUS
Qty: 0 | Refills: 0 | Status: DISCONTINUED | OUTPATIENT
Start: 2017-08-01 | End: 2017-08-02

## 2017-08-01 RX ORDER — VANCOMYCIN HCL 1 G
VIAL (EA) INTRAVENOUS
Qty: 0 | Refills: 0 | Status: DISCONTINUED | OUTPATIENT
Start: 2017-08-01 | End: 2017-08-02

## 2017-08-01 RX ORDER — VANCOMYCIN HCL 1 G
1250 VIAL (EA) INTRAVENOUS EVERY 12 HOURS
Qty: 0 | Refills: 0 | Status: DISCONTINUED | OUTPATIENT
Start: 2017-08-02 | End: 2017-08-02

## 2017-08-01 RX ORDER — ACETAMINOPHEN 500 MG
1000 TABLET ORAL ONCE
Qty: 0 | Refills: 0 | Status: COMPLETED | OUTPATIENT
Start: 2017-08-01 | End: 2017-08-01

## 2017-08-01 RX ORDER — VANCOMYCIN HCL 1 G
1250 VIAL (EA) INTRAVENOUS ONCE
Qty: 0 | Refills: 0 | Status: COMPLETED | OUTPATIENT
Start: 2017-08-01 | End: 2017-08-01

## 2017-08-01 RX ADMIN — Medication 250 MILLILITER(S): at 19:49

## 2017-08-01 RX ADMIN — COLISTIMETHATE SODIUM 100 MILLIGRAM(S): 150 INJECTION INTRAMUSCULAR; INTRAVENOUS at 06:18

## 2017-08-01 RX ADMIN — Medication 12.5 MILLILITER(S): at 17:00

## 2017-08-01 RX ADMIN — PIPERACILLIN AND TAZOBACTAM 200 GRAM(S): 4; .5 INJECTION, POWDER, LYOPHILIZED, FOR SOLUTION INTRAVENOUS at 23:47

## 2017-08-01 RX ADMIN — Medication 50 MILLIGRAM(S): at 22:14

## 2017-08-01 RX ADMIN — FENTANYL CITRATE 1 MICROGRAM(S)/KG/HR: 50 INJECTION INTRAVENOUS at 11:00

## 2017-08-01 RX ADMIN — MICAFUNGIN SODIUM 105 MILLIGRAM(S): 100 INJECTION, POWDER, LYOPHILIZED, FOR SOLUTION INTRAVENOUS at 11:54

## 2017-08-01 RX ADMIN — LINEZOLID 300 MILLIGRAM(S): 600 INJECTION, SOLUTION INTRAVENOUS at 06:18

## 2017-08-01 RX ADMIN — HEPARIN SODIUM 7500 UNIT(S): 5000 INJECTION INTRAVENOUS; SUBCUTANEOUS at 06:19

## 2017-08-01 RX ADMIN — Medication 400 MILLIGRAM(S): at 19:26

## 2017-08-01 RX ADMIN — HEPARIN SODIUM 7500 UNIT(S): 5000 INJECTION INTRAVENOUS; SUBCUTANEOUS at 17:30

## 2017-08-01 RX ADMIN — Medication 166.67 MILLIGRAM(S): at 22:14

## 2017-08-01 RX ADMIN — PIPERACILLIN AND TAZOBACTAM 200 GRAM(S): 4; .5 INJECTION, POWDER, LYOPHILIZED, FOR SOLUTION INTRAVENOUS at 17:30

## 2017-08-01 NOTE — PROGRESS NOTE ADULT - ASSESSMENT
57 F s/p  SBR resection, fistula resection, esophagojejunostomy revision w/ ATN.    Neuro: Propofol gtt, Fentanyl gtt  CV: MAP 65, Levo gtt  Resp: AC//40/12/5  GI/FEN: strict NPO, TPN/lipids, Vit B12  : Yu  ID: micafungin (7/29-), colistin (7/29-) ///dc: Perioperative Gent/Vanc, linezolid (7/25-8/1),   Heme/PPX: SCDs, SQH  Lines: [place] (7/24-), L basilic vein PICC (7/25-), L IJ TLC (7/30-)  Drains: R mediastinal CHECO drain through esophageal hiatus exiting R abd ([place], 7/24-), RLQ 10.2Fr pigtail drain (IR, 7/29)  Wounds: Midline xiphoid to pubis incision w/ abthera vac (7/30) 57 F s/p  SBR resection, fistula resection, esophagojejunostomy revision w/ ATN, postoperative respiratory failure, septic shock.    Neuro: Propofol gtt, Fentanyl gtt  CV: MAP 70-75, Levo gtt; perfusion appears OK.  Resp: AC//40/12/5  GI/FEN: strict NPO, TPN/lipids, Vit B12  : Yu, renal function improved  ID: micafungin (7/29-), colistin (7/29-) ///dc: Perioperative Gent/Vanc, linezolid (7/25-8/1); has pansensitive E. coli and yeast in cultures so continue micafungin and change colistin to zosyn.  Heme/PPX: SCDs, SQH  Lines: [place] (7/24-), L basilic vein PICC (7/25-), L IJ TLC (7/30-)  Drains: R mediastinal CHECO drain through esophageal hiatus exiting R abd ([place], 7/24-), RLQ 10.2Fr pigtail drain (IR, 7/29)  Wounds: Midline xiphoid to pubis incision w/ abthera vac (7/30)

## 2017-08-01 NOTE — PROVIDER CONTACT NOTE (CHANGE IN STATUS NOTIFICATION) - RECOMMENDATIONS
Tylenol 1g IV given. cooling blanket turned on. Given Albumin 5% started. redness marked on abdomen.

## 2017-08-01 NOTE — BRIEF OPERATIVE NOTE - OPERATION/FINDINGS
Procedure: closure of abdominal wall, incisional wound vac placement, retention suture placement., abdominal washout and drainage of interloop abscess, biologic mesh placement  Findings: ~250 cc of purulent fluid drained from interloop abscess.  Previous jejunojejunal anastomosis inspected and intact.  Fascia was able to be closed and a biologic mesh was placed deep to the peritoneum (not sutured) to interpose between peritoneum and the intraperitoneal contents (to reduce chances of fistulization). Procedure: closure of abdominal wall, incisional wound vac placement, retention suture placement., abdominal washout and drainage of interloop abscess, biologic mesh placement  Findings: ~250 cc of purulent fluid drained from interloop abscess.  Previous jejunojejunal anastomosis inspected and intact.  Fascia was able to be closed and an absorbable synthetic mesh was placed deep to the peritoneum (not sutured) to interpose between peritoneum and the intraperitoneal contents (to reduce chances of fistulization).

## 2017-08-01 NOTE — PROGRESS NOTE ADULT - SUBJECTIVE AND OBJECTIVE BOX
Pt seen at bedside this AM. Intubated and sedated. Hemodynamically stable on minimal Levophed. Urine output is adequate. Perfusing well, Lactate cleared.  ROS unable to be obtained 2/2 sedation.      Vital Signs Last 24 Hrs  T(C): 37.4 (01 Aug 2017 10:08), Max: 37.4 (31 Jul 2017 22:01)  T(F): 99.3 (01 Aug 2017 10:08), Max: 99.3 (31 Jul 2017 22:01)  HR: 94 (01 Aug 2017 11:00) (76 - 94)  BP: 98/54 (01 Aug 2017 10:08) (98/54 - 98/54)  BP(mean): --  RR: 16 (01 Aug 2017 11:00) (12 - 16)  SpO2: 94% (01 Aug 2017 11:00) (93% - 100%)  CAPILLARY BLOOD GLUCOSE  90 (01 Aug 2017 11:00)  82 (01 Aug 2017 06:01)  99 (31 Jul 2017 22:00)  97 (31 Jul 2017 15:53)  103 (31 Jul 2017 11:36)          07-31 @ 07:01 - 08-01 @ 07:00  --------------------------------------------------------  IN: 3770.6 mL / OUT: 4800 mL / NET: -1029.4 mL    08-01 @ 07:01 - 08-01 @ 11:31  --------------------------------------------------------  IN: 390.5 mL / OUT: 510 mL / NET: -119.5 mL      Mode: AC/ CMV (Assist Control/ Continuous Mandatory Ventilation)  RR (machine): 12  TV (machine): 400  FiO2: 40  PEEP: 5  ITime: 1  MAP: 8  PIP: 15    ondansetron Injectable 4 milliGRAM(s) IV Push every 6 hours PRN  insulin lispro (HumaLOG) corrective regimen sliding scale   SubCutaneous Before meals and at bedtime  dextrose 5%. 1000 milliLiter(s) IV Continuous <Continuous>  dextrose 50% Injectable 25 Gram(s) IV Push once  sodium chloride 0.9% lock flush 10 milliLiter(s) IV Push every 1 hour PRN  sodium chloride 0.9% lock flush 10 milliLiter(s) IV Push every 12 hours PRN  sodium chloride 0.9% lock flush 20 milliLiter(s) IV Push once  cyanocobalamin Injectable 1000 MICROGram(s) SubCutaneous every 7 days  dextrose 50% Injectable 12.5 Gram(s) IV Push once  linezolid  IVPB   IV Intermittent   heparin  Injectable 7500 Unit(s) SubCutaneous every 8 hours  fat emulsion 20% Infusion 50 Gram(s) IV Continuous <Continuous>  micafungin IVPB 100 milliGRAM(s) IV Intermittent every 24 hours  micafungin IVPB   IV Intermittent   colistimethate IVPB 150 milliGRAM(s) IV Intermittent every 12 hours  propofol Infusion 1.662 MICROgram(s)/kG/Min IV Continuous <Continuous>  fentaNYL   Infusion 0.1 MICROgram(s)/kG/Hr IV Continuous <Continuous>  norepinephrine Infusion 0.005 MICROgram(s)/kG/Min IV Continuous <Continuous>  Parenteral Nutrition - Adult 1 Each TPN Continuous <Continuous>  fat emulsion 20% Infusion 20 Gram(s) IV Continuous <Continuous>  Parenteral Nutrition - Adult 1 Each TPN Continuous <Continuous>  fat emulsion 20% Infusion 20 Gram(s) IV Continuous <Continuous>    LABS:  ABG - ( 30 Jul 2017 21:31 )  pH: 7.43  /  pCO2: 38    /  pO2: 91    / HCO3: 25    / Base Excess: 0.4   /  SaO2: 97          CBC Full  -  ( 01 Aug 2017 04:47 )  WBC Count : 10.2 K/uL  Hemoglobin : 7.9 g/dL  Hematocrit : 24.7 %  Platelet Count - Automated : 153 K/uL  Mean Cell Volume : 89.5 fL  Mean Cell Hemoglobin : 28.6 pg  Mean Cell Hemoglobin Concentration : 32.0 g/dL  Auto Neutrophil # : x  Auto Lymphocyte # : x  Auto Monocyte # : x  Auto Eosinophil # : x  Auto Basophil # : x  Auto Neutrophil % : x  Auto Lymphocyte % : x  Auto Monocyte % : x  Auto Eosinophil % : x  Auto Basophil % : x    08-01    138  |  99  |  29<H>  ----------------------------<  122<H>  4.0   |  28  |  0.70    Ca    7.9<L>      01 Aug 2017 04:47  Phos  5.7     08-01  Mg     2.2     08-01    TPro  4.7<L>  /  Alb  1.3<L>  /  TBili  0.6  /  DBili  0.3<H>  /  AST  15  /  ALT  10  /  AlkPhos  87  08-01    XR reviewed: visible lucency in LLL field. Visible congestion. Lines within appropriate position.

## 2017-08-02 LAB
-  AMPICILLIN: SIGNIFICANT CHANGE UP
-  CIPROFLOXACIN: SIGNIFICANT CHANGE UP
-  ERYTHROMYCIN: SIGNIFICANT CHANGE UP
-  LINEZOLID: SIGNIFICANT CHANGE UP
-  PENICILLIN: SIGNIFICANT CHANGE UP
-  TETRACYCLINE: SIGNIFICANT CHANGE UP
ALBUMIN SERPL ELPH-MCNC: 1.5 G/DL — LOW (ref 3.3–5)
ALBUMIN SERPL ELPH-MCNC: 1.8 G/DL — LOW (ref 3.3–5)
ALP SERPL-CCNC: 105 U/L — SIGNIFICANT CHANGE UP (ref 40–120)
ALP SERPL-CCNC: 107 U/L — SIGNIFICANT CHANGE UP (ref 40–120)
ALT FLD-CCNC: 9 U/L — LOW (ref 10–45)
ALT FLD-CCNC: 9 U/L — LOW (ref 10–45)
ANION GAP SERPL CALC-SCNC: 12 MMOL/L — SIGNIFICANT CHANGE UP (ref 5–17)
ANION GAP SERPL CALC-SCNC: 12 MMOL/L — SIGNIFICANT CHANGE UP (ref 5–17)
APPEARANCE UR: CLEAR — SIGNIFICANT CHANGE UP
AST SERPL-CCNC: 10 U/L — SIGNIFICANT CHANGE UP (ref 10–40)
AST SERPL-CCNC: 13 U/L — SIGNIFICANT CHANGE UP (ref 10–40)
BASE EXCESS BLDA CALC-SCNC: 1.7 MMOL/L — SIGNIFICANT CHANGE UP (ref -2–3)
BILIRUB SERPL-MCNC: 1 MG/DL — SIGNIFICANT CHANGE UP (ref 0.2–1.2)
BILIRUB SERPL-MCNC: 1.2 MG/DL — SIGNIFICANT CHANGE UP (ref 0.2–1.2)
BILIRUB UR-MCNC: NEGATIVE — SIGNIFICANT CHANGE UP
BUN SERPL-MCNC: 29 MG/DL — HIGH (ref 7–23)
BUN SERPL-MCNC: 31 MG/DL — HIGH (ref 7–23)
CALCIUM SERPL-MCNC: 7.9 MG/DL — LOW (ref 8.4–10.5)
CALCIUM SERPL-MCNC: 8 MG/DL — LOW (ref 8.4–10.5)
CHLORIDE SERPL-SCNC: 103 MMOL/L — SIGNIFICANT CHANGE UP (ref 96–108)
CHLORIDE SERPL-SCNC: 105 MMOL/L — SIGNIFICANT CHANGE UP (ref 96–108)
CO2 SERPL-SCNC: 26 MMOL/L — SIGNIFICANT CHANGE UP (ref 22–31)
CO2 SERPL-SCNC: 28 MMOL/L — SIGNIFICANT CHANGE UP (ref 22–31)
COLOR SPEC: YELLOW — SIGNIFICANT CHANGE UP
CREAT SERPL-MCNC: 1 MG/DL — SIGNIFICANT CHANGE UP (ref 0.5–1.3)
CREAT SERPL-MCNC: 1 MG/DL — SIGNIFICANT CHANGE UP (ref 0.5–1.3)
CULTURE RESULTS: NO GROWTH — SIGNIFICANT CHANGE UP
DIFF PNL FLD: NEGATIVE — SIGNIFICANT CHANGE UP
GAS PNL BLDA: SIGNIFICANT CHANGE UP
GAS PNL BLDA: SIGNIFICANT CHANGE UP
GAS PNL BLDV: SIGNIFICANT CHANGE UP
GLUCOSE SERPL-MCNC: 141 MG/DL — HIGH (ref 70–99)
GLUCOSE SERPL-MCNC: 169 MG/DL — HIGH (ref 70–99)
GLUCOSE UR QL: NEGATIVE — SIGNIFICANT CHANGE UP
HCO3 BLDA-SCNC: 27 MMOL/L — SIGNIFICANT CHANGE UP (ref 21–28)
HCO3 BLDA-SCNC: 28 MMOL/L — SIGNIFICANT CHANGE UP (ref 21–28)
HCT VFR BLD CALC: 28.2 % — LOW (ref 34.5–45)
HCT VFR BLD CALC: 29.4 % — LOW (ref 34.5–45)
HGB BLD-MCNC: 9.4 G/DL — LOW (ref 11.5–15.5)
HGB BLD-MCNC: 9.6 G/DL — LOW (ref 11.5–15.5)
KETONES UR-MCNC: NEGATIVE — SIGNIFICANT CHANGE UP
LACTATE SERPL-SCNC: 2.1 MMOL/L — HIGH (ref 0.5–2)
LACTATE SERPL-SCNC: 2.6 MMOL/L — HIGH (ref 0.5–2)
LACTATE SERPL-SCNC: 2.7 MMOL/L — HIGH (ref 0.5–2)
LEUKOCYTE ESTERASE UR-ACNC: NEGATIVE — SIGNIFICANT CHANGE UP
MAGNESIUM SERPL-MCNC: 2.4 MG/DL — SIGNIFICANT CHANGE UP (ref 1.6–2.6)
MCHC RBC-ENTMCNC: 28.4 PG — SIGNIFICANT CHANGE UP (ref 27–34)
MCHC RBC-ENTMCNC: 29.2 PG — SIGNIFICANT CHANGE UP (ref 27–34)
MCHC RBC-ENTMCNC: 32.7 G/DL — SIGNIFICANT CHANGE UP (ref 32–36)
MCHC RBC-ENTMCNC: 33.3 G/DL — SIGNIFICANT CHANGE UP (ref 32–36)
MCV RBC AUTO: 87 FL — SIGNIFICANT CHANGE UP (ref 80–100)
MCV RBC AUTO: 87.6 FL — SIGNIFICANT CHANGE UP (ref 80–100)
METHOD TYPE: SIGNIFICANT CHANGE UP
NITRITE UR-MCNC: NEGATIVE — SIGNIFICANT CHANGE UP
PCO2 BLDA: 51 MMHG — HIGH (ref 32–45)
PCO2 BLDA: 52 MMHG — HIGH (ref 32–45)
PH BLDA: 7.34 — LOW (ref 7.35–7.45)
PH BLDA: 7.35 — SIGNIFICANT CHANGE UP (ref 7.35–7.45)
PH UR: 5.5 — SIGNIFICANT CHANGE UP (ref 5–8)
PHOSPHATE SERPL-MCNC: 6.1 MG/DL — HIGH (ref 2.5–4.5)
PHOSPHATE SERPL-MCNC: 6.4 MG/DL — HIGH (ref 2.5–4.5)
PLATELET # BLD AUTO: 157 K/UL — SIGNIFICANT CHANGE UP (ref 150–400)
PLATELET # BLD AUTO: 165 K/UL — SIGNIFICANT CHANGE UP (ref 150–400)
PO2 BLDA: 130 MMHG — HIGH (ref 83–108)
PO2 BLDA: 88 MMHG — SIGNIFICANT CHANGE UP (ref 83–108)
POTASSIUM SERPL-MCNC: 5.6 MMOL/L — HIGH (ref 3.5–5.3)
POTASSIUM SERPL-MCNC: 5.8 MMOL/L — HIGH (ref 3.5–5.3)
POTASSIUM SERPL-SCNC: 5.6 MMOL/L — HIGH (ref 3.5–5.3)
POTASSIUM SERPL-SCNC: 5.8 MMOL/L — HIGH (ref 3.5–5.3)
PROT SERPL-MCNC: 4.8 G/DL — LOW (ref 6–8.3)
PROT SERPL-MCNC: 5.1 G/DL — LOW (ref 6–8.3)
PROT UR-MCNC: (no result) MG/DL
PTH-INTACT IO % DIF SERPL: 152.1 PG/ML — HIGH (ref 8.5–72.5)
RBC # BLD: 3.22 M/UL — LOW (ref 3.8–5.2)
RBC # BLD: 3.38 M/UL — LOW (ref 3.8–5.2)
RBC # FLD: 16 % — SIGNIFICANT CHANGE UP (ref 10.3–16.9)
RBC # FLD: 16.1 % — SIGNIFICANT CHANGE UP (ref 10.3–16.9)
SAO2 % BLDA: 96 % — SIGNIFICANT CHANGE UP (ref 95–100)
SAO2 % BLDA: 98 % — SIGNIFICANT CHANGE UP (ref 95–100)
SODIUM SERPL-SCNC: 143 MMOL/L — SIGNIFICANT CHANGE UP (ref 135–145)
SODIUM SERPL-SCNC: 143 MMOL/L — SIGNIFICANT CHANGE UP (ref 135–145)
SP GR SPEC: 1.01 — SIGNIFICANT CHANGE UP (ref 1–1.03)
SPECIMEN SOURCE: SIGNIFICANT CHANGE UP
UROBILINOGEN FLD QL: 0.2 E.U./DL — SIGNIFICANT CHANGE UP
WBC # BLD: 11.2 K/UL — HIGH (ref 3.8–10.5)
WBC # BLD: 11.7 K/UL — HIGH (ref 3.8–10.5)
WBC # FLD AUTO: 11.2 K/UL — HIGH (ref 3.8–10.5)
WBC # FLD AUTO: 11.7 K/UL — HIGH (ref 3.8–10.5)

## 2017-08-02 PROCEDURE — 71010: CPT | Mod: 26

## 2017-08-02 PROCEDURE — 99291 CRITICAL CARE FIRST HOUR: CPT

## 2017-08-02 RX ORDER — LINEZOLID 600 MG/300ML
600 INJECTION, SOLUTION INTRAVENOUS EVERY 12 HOURS
Qty: 0 | Refills: 0 | Status: DISCONTINUED | OUTPATIENT
Start: 2017-08-02 | End: 2017-08-02

## 2017-08-02 RX ORDER — LINEZOLID 600 MG/300ML
600 INJECTION, SOLUTION INTRAVENOUS EVERY 12 HOURS
Qty: 0 | Refills: 0 | Status: DISCONTINUED | OUTPATIENT
Start: 2017-08-02 | End: 2017-08-15

## 2017-08-02 RX ORDER — DEXTROSE 10 % IN WATER 10 %
1000 INTRAVENOUS SOLUTION INTRAVENOUS
Qty: 0 | Refills: 0 | Status: DISCONTINUED | OUTPATIENT
Start: 2017-08-02 | End: 2017-08-03

## 2017-08-02 RX ORDER — ELECTROLYTE SOLUTION,INJ
1 VIAL (ML) INTRAVENOUS
Qty: 0 | Refills: 0 | Status: DISCONTINUED | OUTPATIENT
Start: 2017-08-02 | End: 2017-08-02

## 2017-08-02 RX ORDER — PANTOPRAZOLE SODIUM 20 MG/1
40 TABLET, DELAYED RELEASE ORAL DAILY
Qty: 0 | Refills: 0 | Status: DISCONTINUED | OUTPATIENT
Start: 2017-08-02 | End: 2017-12-27

## 2017-08-02 RX ORDER — I.V. FAT EMULSION 20 G/100ML
20 EMULSION INTRAVENOUS
Qty: 0 | Refills: 0 | Status: DISCONTINUED | OUTPATIENT
Start: 2017-08-02 | End: 2017-08-02

## 2017-08-02 RX ADMIN — Medication 2: at 23:25

## 2017-08-02 RX ADMIN — PANTOPRAZOLE SODIUM 40 MILLIGRAM(S): 20 TABLET, DELAYED RELEASE ORAL at 16:35

## 2017-08-02 RX ADMIN — PIPERACILLIN AND TAZOBACTAM 200 GRAM(S): 4; .5 INJECTION, POWDER, LYOPHILIZED, FOR SOLUTION INTRAVENOUS at 13:41

## 2017-08-02 RX ADMIN — I.V. FAT EMULSION 8.33 GRAM(S): 20 EMULSION INTRAVENOUS at 23:03

## 2017-08-02 RX ADMIN — PREGABALIN 1000 MICROGRAM(S): 225 CAPSULE ORAL at 13:42

## 2017-08-02 RX ADMIN — Medication 60 MILLILITER(S): at 09:56

## 2017-08-02 RX ADMIN — PIPERACILLIN AND TAZOBACTAM 200 GRAM(S): 4; .5 INJECTION, POWDER, LYOPHILIZED, FOR SOLUTION INTRAVENOUS at 17:19

## 2017-08-02 RX ADMIN — LINEZOLID 300 MILLIGRAM(S): 600 INJECTION, SOLUTION INTRAVENOUS at 16:35

## 2017-08-02 RX ADMIN — HEPARIN SODIUM 7500 UNIT(S): 5000 INJECTION INTRAVENOUS; SUBCUTANEOUS at 17:08

## 2017-08-02 RX ADMIN — HEPARIN SODIUM 7500 UNIT(S): 5000 INJECTION INTRAVENOUS; SUBCUTANEOUS at 01:33

## 2017-08-02 RX ADMIN — MICAFUNGIN SODIUM 105 MILLIGRAM(S): 100 INJECTION, POWDER, LYOPHILIZED, FOR SOLUTION INTRAVENOUS at 13:42

## 2017-08-02 RX ADMIN — NYSTATIN CREAM 1 APPLICATION(S): 100000 CREAM TOPICAL at 05:51

## 2017-08-02 RX ADMIN — Medication 166.67 MILLIGRAM(S): at 11:00

## 2017-08-02 RX ADMIN — PIPERACILLIN AND TAZOBACTAM 200 GRAM(S): 4; .5 INJECTION, POWDER, LYOPHILIZED, FOR SOLUTION INTRAVENOUS at 05:52

## 2017-08-02 RX ADMIN — NYSTATIN CREAM 1 APPLICATION(S): 100000 CREAM TOPICAL at 17:10

## 2017-08-02 RX ADMIN — Medication 50 MILLIGRAM(S): at 05:51

## 2017-08-02 RX ADMIN — HEPARIN SODIUM 7500 UNIT(S): 5000 INJECTION INTRAVENOUS; SUBCUTANEOUS at 09:56

## 2017-08-02 RX ADMIN — PIPERACILLIN AND TAZOBACTAM 200 GRAM(S): 4; .5 INJECTION, POWDER, LYOPHILIZED, FOR SOLUTION INTRAVENOUS at 23:00

## 2017-08-02 NOTE — PROGRESS NOTE ADULT - ASSESSMENT
57 F s/p  SBR resection, fistula resection, esophagojejunostomy revision w/ ATN.    Neuro: Propofol gtt, Fentanyl gtt  CV: MAP 65, Levo gtt  Resp: AC//40/12/10  GI/FEN: NPO, TPN/lipids, Vit B12,   : Yu  Endo: ISS  ID: anastamotic leak: zosyn (8/1--), micafungin (7/29-), vanc (8/1--) ///dc: Perioperative Gent/Vanc, linezolid (7/25-8/1), colistin (7/29-8/1)  Heme/PPX: SCDs, SQH  Lines: Radha (7/24-), L basilic vein PICC (7/25-), L IJ TLC (7/30-)  Drains: R mediastinal CHECO drain through esophageal hiatus exiting R abd (Oli, 7/24-),   Wounds: Midline xiphoid to pubis incision Prevena vac. RLQ CHECO, LLQ CHECO to water seal. 57 F s/p  SBR resection, fistula resection, esophagojejunostomy revision w/ post-op course complicated by RTOR for distal anastomosis breakdown, ATN, acute respiratory failure, & septic shock.     Neuro: Propofol gtt, Fentanyl gtt  CV: MAP 65, Levo gtt  Resp: AC//40/12/10  GI/FEN: NPO, TPN/lipids, Vit B12,   : Yu  Endo: ISS  ID: anastamotic leak: zosyn (8/1--), micafungin (7/29-), vanc (8/1--) ///dc: Perioperative Gent/Vanc, linezolid (7/25-8/1), colistin (7/29-8/1)  Heme/PPX: SCDs, SQH  Lines: [place] (7/24-), L basilic vein PICC (7/25-), L IJ TLC (7/30-)  Drains: R mediastinal CHECO drain through esophageal hiatus exiting R abd (Oli, 7/24-), RLQ CHECO to bulb suction, LLQ CHECO to water seal. Prevena vac to midline incision.   Wounds: Midline xiphoid to pubis incision. 57 F s/p  SBR resection, fistula resection, esophagojejunostomy revision w/ post-op course complicated by RTOR for distal anastomosis breakdown, ATN, acute respiratory failure, & septic shock.     Neuro: Propofol gtt, Fentanyl gtt  CV: MAP 70-75, Levo gtt. Perfusion otherwise OK with acceptable lactate, UO  Resp: AC//40/12/10  GI/FEN: NPO, TPN/lipids, Vit B12,  TPN held with hyperkalemia and rewritten w/o K/Phos  : Aimee  Endo: ISS  ID: anastamotic leak: zosyn (8/1--), micafungin (7/29-), vanc (8/1--) ///dc: Perioperative Gent/Vanc, linezolid (7/25-8/1), colistin (7/29-8/1). VRE now in cultures, to readd linezolid  Heme/PPX: SCDs, SQH  Lines: Radha (7/24-), L basilic vein PICC (7/25-), L IJ TLC (7/30-)  Drains: R mediastinal CHECO drain through esophageal hiatus exiting R abd (Oli, 7/24-), RLQ CHECO to bulb suction, LLQ CHECO to water seal. Prevena vac to midline incision.   Wounds: Midline xiphoid to pubis incision.

## 2017-08-02 NOTE — CHART NOTE - NSCHARTNOTEFT_GEN_A_CORE
Admitting Diagnosis:   57 F s/p  SBR resection, fistula resection, esophagojejunostomy revision w/ post-op course complicated by RTOR for distal anastomosis breakdown, ATN, acute respiratory failure, & septic shock.     PAST MEDICAL & SURGICAL HISTORY:  Reflux  Obesity  DVT (deep venous thrombosis): 2013 neck  Diverticulitis  Hypertension  Elective surgery: abodominal wall surgery  dec 2016  Elective surgery: NOV 2016 gastric bypass revision  Gastric bypass status for obesity: gastric sleeve, 6/2012  S/P breast biopsy: 2011, left  S/P colon resection: 2011  S/P knee surgery: repair 2009, 2011  right; left  Umbilical hernia: 2000  S/P appendectomy: 1975  S/P tonsillectomy: 1967      Current Nutrition Order: TPN (central line): 245 g D, 88 g AA, 20 g lipids, 1449 mL fluid. Providing 1385 kcal, 88 g protein, & 1449 mL fluid. GIR = 1.7     GI Issues: Distended.     Pain: Being managed, pt intubated.     Skin Integrity: Surgical incision    Labs:   08-02    143  |  103  |  29<H>  ----------------------------<  141<H>  5.6<H>   |  28  |  1.00    Ca    7.9<L>      02 Aug 2017 10:04  Phos  6.1     08-02  Mg     2.4     08-02    TPro  5.1<L>  /  Alb  1.8<L>  /  TBili  1.0  /  DBili  x   /  AST  10  /  ALT  9<L>  /  AlkPhos  105  08-02    CAPILLARY BLOOD GLUCOSE  93 (01 Aug 2017 22:00)  94 (01 Aug 2017 20:00)  70 (01 Aug 2017 17:17)  64 (01 Aug 2017 16:46)      Medications:  MEDICATIONS  (STANDING):  insulin lispro (HumaLOG) corrective regimen sliding scale   SubCutaneous Before meals and at bedtime  dextrose 5%. 1000 milliLiter(s) (50 mL/Hr) IV Continuous <Continuous>  dextrose 50% Injectable 25 Gram(s) IV Push once  sodium chloride 0.9% lock flush 20 milliLiter(s) IV Push once  cyanocobalamin Injectable 1000 MICROGram(s) SubCutaneous every 7 days  dextrose 50% Injectable 12.5 Gram(s) IV Push once  heparin  Injectable 7500 Unit(s) SubCutaneous every 8 hours  fat emulsion 20% Infusion 50 Gram(s) (20.8 mL/Hr) IV Continuous <Continuous>  micafungin IVPB 100 milliGRAM(s) IV Intermittent every 24 hours  micafungin IVPB   IV Intermittent   propofol Infusion 1.662 MICROgram(s)/kG/Min (1 mL/Hr) IV Continuous <Continuous>  fentaNYL   Infusion 0.1 MICROgram(s)/kG/Hr (1 mL/Hr) IV Continuous <Continuous>  norepinephrine Infusion 0.005 MICROgram(s)/kG/Min (1 mL/Hr) IV Continuous <Continuous>  fat emulsion 20% Infusion 20 Gram(s) (8.33 mL/Hr) IV Continuous <Continuous>  piperacillin/tazobactam IVPB. 3.375 Gram(s) IV Intermittent every 6 hours  nystatin Powder 1 Application(s) Topical two times a day  vancomycin  IVPB   IV Intermittent   vancomycin  IVPB 1250 milliGRAM(s) IV Intermittent every 12 hours  dextrose 10%. 1000 milliLiter(s) (60 mL/Hr) IV Continuous <Continuous>  Parenteral Nutrition - Adult 1 Each (56 mL/Hr) TPN Continuous <Continuous>  fat emulsion 20% Infusion 20 Gram(s) (8.33 mL/Hr) IV Continuous <Continuous>    MEDICATIONS  (PRN):  ondansetron Injectable 4 milliGRAM(s) IV Push every 6 hours PRN Nausea  sodium chloride 0.9% lock flush 10 milliLiter(s) IV Push every 1 hour PRN After each medication administration  sodium chloride 0.9% lock flush 10 milliLiter(s) IV Push every 12 hours PRN Lumen of catheter NOT used      Weight:  Admit weight - 76 kg  8/1- 99.5 kg    Weight Change: Question accuracy 2/2 large shift in weight    Estimated energy needs using 52 kg IBW:  25-30 kcal/kg (4129-1152 kcal).   1.8-2 g/kg ( g protein).  30-35 mL/kg (2612-8486 mL fluid).    Subjective: Remains intubated. Per discussion with MD, TPN held 2/2 elevated K level. Propofol running at 10 mL/hr (264 kcal).     Previous Nutrition Diagnosis: Increased nutrient needs RT fistula & S/p surgery AEB 1.8-2 g/kg protein.     Active [ X  ]  Resolved [   ]    Goal: Meet % of needs via tolerated route.    Recommendations: Continue to meet % of needs via tolerated route.     Education: n/a-vent    Risk Level: High [   ] Moderate [   ] Low [   ]

## 2017-08-02 NOTE — PROGRESS NOTE ADULT - SUBJECTIVE AND OBJECTIVE BOX
I have seen and examined the patient through out the day. She has a complicated surgical history and several postoperative issues.  1) abdominal drain on the left gutter has enteric drainage consistent with a leak (anastomotic vs new enterotomy). We will monitor the output closely. If the leakage is controlled, we may be able to hold off on surgical re-exploration since her nutritional status is poor with albumin < 2 and her abdomen is hostile after multiple procedures.  2) Abdominal wall erythema over the left flank needs close monitoring. She has raised skin flapped from her recent laparotomy with contamination. Infected fluid can trap through the dissected plane. I have opened up her midline sutures with evacuated of bloody fluids. Wound will be packed with wet-dry dressing.  3) Wean pressor as tolerated.  4) Continue IV antibotics.

## 2017-08-02 NOTE — CHART NOTE - NSCHARTNOTEFT_GEN_A_CORE
56yo F w/ PMHx of HTN, gastric bypass in 2002 c/b stricture, corkscrewed sleeve and chronic leak, recently revised on 11/28/16 with protracted (70-day) postoperative course complicated by MDR infections presents for enterocutaneous fistula resection, went to the OR for resection.  Discovered esophageal stricture intra-op and called Dr. Baird urgently to assist in the case. Pt is s/p exploratory laparotomy, resection of enterocutaneous fistula, resection of small bowel, resection of a small piece of liver and esophagojejunostomy with [id number] reconstruction on 7/24/17. CT abdomen/pelvis scan 7/29/17 showed no evidence of oral contrast leakage from esophagojejunal anastomosis; however multiple intrabdominal abscesses found. ID following, pt currently on micafunging, vancomcyin, and zosyn. Pt is now s/p closure of abdominal wall, incisional wound vac placement, abdominal washout and drainage of interloop abscess with biologic mesh placement 8/1/17.   -pt currently intubated requiring levophed for HD instability, c/w medical management as per primary team  -will continue to follow  -discussed with Dr. Oil Fox PA-C  CT Surgery

## 2017-08-02 NOTE — PROGRESS NOTE ADULT - SUBJECTIVE AND OBJECTIVE BOX
SICU DAILY PROGRESS NOTE    INTERVAL HPI / OVERNIGHT EVENTS:     MEDICATIONS  (STANDING):  insulin lispro (HumaLOG) corrective regimen sliding scale   SubCutaneous Before meals and at bedtime  dextrose 5%. 1000 milliLiter(s) (50 mL/Hr) IV Continuous <Continuous>  dextrose 50% Injectable 25 Gram(s) IV Push once  sodium chloride 0.9% lock flush 20 milliLiter(s) IV Push once  cyanocobalamin Injectable 1000 MICROGram(s) SubCutaneous every 7 days  dextrose 50% Injectable 12.5 Gram(s) IV Push once  heparin  Injectable 7500 Unit(s) SubCutaneous every 8 hours  fat emulsion 20% Infusion 50 Gram(s) (20.8 mL/Hr) IV Continuous <Continuous>  micafungin IVPB 100 milliGRAM(s) IV Intermittent every 24 hours  micafungin IVPB   IV Intermittent   propofol Infusion 1.662 MICROgram(s)/kG/Min (1 mL/Hr) IV Continuous <Continuous>  fentaNYL   Infusion 0.1 MICROgram(s)/kG/Hr (1 mL/Hr) IV Continuous <Continuous>  norepinephrine Infusion 0.005 MICROgram(s)/kG/Min (1 mL/Hr) IV Continuous <Continuous>  fat emulsion 20% Infusion 20 Gram(s) (8.33 mL/Hr) IV Continuous <Continuous>  piperacillin/tazobactam IVPB. 3.375 Gram(s) IV Intermittent every 6 hours  nystatin Powder 1 Application(s) Topical two times a day  vancomycin  IVPB   IV Intermittent   vancomycin  IVPB 1250 milliGRAM(s) IV Intermittent every 12 hours  dextrose 10%. 1000 milliLiter(s) (60 mL/Hr) IV Continuous <Continuous>  Parenteral Nutrition - Adult 1 Each (56 mL/Hr) TPN Continuous <Continuous>  fat emulsion 20% Infusion 20 Gram(s) (8.3 mL/Hr) IV Continuous <Continuous>    MEDICATIONS  (PRN):  ondansetron Injectable 4 milliGRAM(s) IV Push every 6 hours PRN Nausea  sodium chloride 0.9% lock flush 10 milliLiter(s) IV Push every 1 hour PRN After each medication administration  sodium chloride 0.9% lock flush 10 milliLiter(s) IV Push every 12 hours PRN Lumen of catheter NOT used    Vital Signs Last 24 Hrs  T(C): 37.3 (02 Aug 2017 10:00), Max: 38.3 (01 Aug 2017 19:00)  T(F): 99.1 (02 Aug 2017 10:00), Max: 101 (01 Aug 2017 19:00)  HR: 72 (02 Aug 2017 11:06) (70 - 104)  BP: 106/61 (02 Aug 2017 11:06) (77/58 - 106/61)  BP(mean): 77 (02 Aug 2017 11:06) (69 - 78)  RR: 11 (02 Aug 2017 11:06) (10 - 26)  SpO2: 97% (02 Aug 2017 11:06) (91% - 100%)    Mode: AC/ CMV (Assist Control/ Continuous Mandatory Ventilation)  RR (machine): 12  TV (machine): 450  FiO2: 40  PEEP: 10  ITime: 1  MAP: 13  PIP: 25    ABG - ( 02 Aug 2017 05:49 )  pH: 7.34  /  pCO2: 51    /  pO2: 130   / HCO3: 27    / Base Excess: x     /  SaO2: 98        I&O's Detail    01 Aug 2017 07:01  -  02 Aug 2017 07:00  --------------------------------------------------------  IN:    Albumin 5%  - 500 mL: 500 mL    Fat Emulsion 20%: 107.9 mL    fentaNYL  Infusion: 208 mL    IV PiggyBack: 550 mL    norepinephrine Infusion: 615 mL    propofol Infusion: 263 mL    TPN (Total Parenteral Nutrition): 1089.3 mL  Total IN: 3333.2 mL    OUT:    Bulb: 5 mL    Bulb: 15 mL    Bulb: 415 mL    Indwelling Catheter - Urethral: 1595 mL    VAC (Vacuum Assisted Closure) System: 175 mL  Total OUT: 2205 mL    Total NET: 1128.2 mL      02 Aug 2017 07:01  -  02 Aug 2017 11:58  --------------------------------------------------------  IN:    Fat Emulsion 20%: 16.6 mL    fentaNYL  Infusion: 42 mL    IV PiggyBack: 250 mL    norepinephrine Infusion: 67 mL    propofol Infusion: 39 mL    TPN (Total Parenteral Nutrition): 116 mL  Total IN: 530.6 mL    OUT:    Bulb: 90 mL    Indwelling Catheter - Urethral: 320 mL  Total OUT: 410 mL    Total NET: 120.6 mL    PHYSICAL EXAM:    LABS:                        9.4    11.2  )-----------( 165      ( 02 Aug 2017 05:52 )             28.2     143  |  103  |  29<H>  ----------------------------<  141<H>  5.6<H>   |  28  |  1.00    Ca    7.9<L>      02 Aug 2017 10:04  Phos  6.1     08-02  Mg     2.4     08-02    TPro  5.1<L>  /  Alb  1.8<L>  /  TBili  1.0  /  DBili  x   /  AST  10  /  ALT  9<L>  /  AlkPhos  105  08-02    PT/INR - ( 01 Aug 2017 16:56 )   PT: 12.9 sec;   INR: 1.16     PTT - ( 01 Aug 2017 16:56 )  PTT:31.8 sec SICU DAILY PROGRESS NOTE    INTERVAL HPI / OVERNIGHT EVENTS:   8/2: LLQ CHECO drain filled w/ feculent output; prevena vac output slightly feculent. CHECO attached to Pleurex. K 5.8; stopped TPN & started D10; will recheck K. Also checking ioPTH given low phos.   o/n: febrile to 101.1 with low UO and increasing levo requirements - started vanc and stress dose steroids and given 500cc bolus 5% albumin. UO improving, levo decreasing throughout shift. lactate unchanged. patch of erythema noted on left flank- marked with pen. beefy red rash noted in inguinal folds, nystatin powder added.   8/1: d/w  switched from colistin to zosyn, stopped linezolid. cont micafungin --went to OR for washout, also found pocket of pus, , given 2U PRBC, abd closed w/ provena, IR drain removed, new L sided CHECO placed.     MEDICATIONS  (STANDING):  insulin lispro (HumaLOG) corrective regimen sliding scale   SubCutaneous Before meals and at bedtime  dextrose 5%. 1000 milliLiter(s) (50 mL/Hr) IV Continuous <Continuous>  dextrose 50% Injectable 25 Gram(s) IV Push once  sodium chloride 0.9% lock flush 20 milliLiter(s) IV Push once  cyanocobalamin Injectable 1000 MICROGram(s) SubCutaneous every 7 days  dextrose 50% Injectable 12.5 Gram(s) IV Push once  heparin  Injectable 7500 Unit(s) SubCutaneous every 8 hours  fat emulsion 20% Infusion 50 Gram(s) (20.8 mL/Hr) IV Continuous <Continuous>  micafungin IVPB 100 milliGRAM(s) IV Intermittent every 24 hours  micafungin IVPB   IV Intermittent   propofol Infusion 1.662 MICROgram(s)/kG/Min (1 mL/Hr) IV Continuous <Continuous>  fentaNYL   Infusion 0.1 MICROgram(s)/kG/Hr (1 mL/Hr) IV Continuous <Continuous>  norepinephrine Infusion 0.005 MICROgram(s)/kG/Min (1 mL/Hr) IV Continuous <Continuous>  fat emulsion 20% Infusion 20 Gram(s) (8.33 mL/Hr) IV Continuous <Continuous>  piperacillin/tazobactam IVPB. 3.375 Gram(s) IV Intermittent every 6 hours  nystatin Powder 1 Application(s) Topical two times a day  vancomycin  IVPB   IV Intermittent   vancomycin  IVPB 1250 milliGRAM(s) IV Intermittent every 12 hours  dextrose 10%. 1000 milliLiter(s) (60 mL/Hr) IV Continuous <Continuous>  Parenteral Nutrition - Adult 1 Each (56 mL/Hr) TPN Continuous <Continuous>  fat emulsion 20% Infusion 20 Gram(s) (8.3 mL/Hr) IV Continuous <Continuous>    MEDICATIONS  (PRN):  ondansetron Injectable 4 milliGRAM(s) IV Push every 6 hours PRN Nausea  sodium chloride 0.9% lock flush 10 milliLiter(s) IV Push every 1 hour PRN After each medication administration  sodium chloride 0.9% lock flush 10 milliLiter(s) IV Push every 12 hours PRN Lumen of catheter NOT used    Vital Signs Last 24 Hrs  T(C): 37.3 (02 Aug 2017 10:00), Max: 38.3 (01 Aug 2017 19:00)  T(F): 99.1 (02 Aug 2017 10:00), Max: 101 (01 Aug 2017 19:00)  HR: 72 (02 Aug 2017 11:06) (70 - 104)  BP: 106/61 (02 Aug 2017 11:06) (77/58 - 106/61)  BP(mean): 77 (02 Aug 2017 11:06) (69 - 78)  RR: 11 (02 Aug 2017 11:06) (10 - 26)  SpO2: 97% (02 Aug 2017 11:06) (91% - 100%)    Mode: AC/ CMV (Assist Control/ Continuous Mandatory Ventilation)  RR (machine): 12  TV (machine): 450  FiO2: 40  PEEP: 10  ITime: 1  MAP: 13  PIP: 25    ABG - ( 02 Aug 2017 05:49 )  pH: 7.34  /  pCO2: 51    /  pO2: 130   / HCO3: 27    / Base Excess: x     /  SaO2: 98        I&O's Detail    01 Aug 2017 07:01  -  02 Aug 2017 07:00  --------------------------------------------------------  IN:    Albumin 5%  - 500 mL: 500 mL    Fat Emulsion 20%: 107.9 mL    fentaNYL  Infusion: 208 mL    IV PiggyBack: 550 mL    norepinephrine Infusion: 615 mL    propofol Infusion: 263 mL    TPN (Total Parenteral Nutrition): 1089.3 mL  Total IN: 3333.2 mL    OUT:    Bulb: 5 mL    Bulb: 15 mL    Bulb: 415 mL    Indwelling Catheter - Urethral: 1595 mL    VAC (Vacuum Assisted Closure) System: 175 mL  Total OUT: 2205 mL    Total NET: 1128.2 mL      02 Aug 2017 07:01  -  02 Aug 2017 11:58  --------------------------------------------------------  IN:    Fat Emulsion 20%: 16.6 mL    fentaNYL  Infusion: 42 mL    IV PiggyBack: 250 mL    norepinephrine Infusion: 67 mL    propofol Infusion: 39 mL    TPN (Total Parenteral Nutrition): 116 mL  Total IN: 530.6 mL    OUT:    Bulb: 90 mL    Indwelling Catheter - Urethral: 320 mL  Total OUT: 410 mL    Total NET: 120.6 mL    PHYSICAL EXAM:    LABS:                        9.4    11.2  )-----------( 165      ( 02 Aug 2017 05:52 )             28.2     143  |  103  |  29<H>  ----------------------------<  141<H>  5.6<H>   |  28  |  1.00    Ca    7.9<L>      02 Aug 2017 10:04  Phos  6.1     08-02  Mg     2.4     08-02    TPro  5.1<L>  /  Alb  1.8<L>  /  TBili  1.0  /  DBili  x   /  AST  10  /  ALT  9<L>  /  AlkPhos  105  08-02    PT/INR - ( 01 Aug 2017 16:56 )   PT: 12.9 sec;   INR: 1.16     PTT - ( 01 Aug 2017 16:56 )  PTT:31.8 sec SICU DAILY PROGRESS NOTE    INTERVAL HPI / OVERNIGHT EVENTS:   8/2: LLQ CHECO drain filled w/ feculent output; prevena vac output slightly feculent. CHECO attached to Pleurex. K 5.8; stopped TPN & started D10; will recheck K. Also checking ioPTH given low phos.   o/n: febrile to 101.1 with low UO and increasing levo requirements - started vanc and stress dose steroids and given 500cc bolus 5% albumin. UO improving, levo decreasing throughout shift. lactate unchanged. patch of erythema noted on left flank- marked with pen. beefy red rash noted in inguinal folds, nystatin powder added.   8/1: d/w  switched from colistin to zosyn, stopped linezolid. cont micafungin --went to OR for washout, also found pocket of pus, , given 2U PRBC, abd closed w/ provena, IR drain removed, new L sided CHECO placed.     MEDICATIONS  (STANDING):  insulin lispro (HumaLOG) corrective regimen sliding scale   SubCutaneous Before meals and at bedtime  dextrose 5%. 1000 milliLiter(s) (50 mL/Hr) IV Continuous <Continuous>  dextrose 50% Injectable 25 Gram(s) IV Push once  sodium chloride 0.9% lock flush 20 milliLiter(s) IV Push once  cyanocobalamin Injectable 1000 MICROGram(s) SubCutaneous every 7 days  dextrose 50% Injectable 12.5 Gram(s) IV Push once  heparin  Injectable 7500 Unit(s) SubCutaneous every 8 hours  fat emulsion 20% Infusion 50 Gram(s) (20.8 mL/Hr) IV Continuous <Continuous>  micafungin IVPB 100 milliGRAM(s) IV Intermittent every 24 hours  micafungin IVPB   IV Intermittent   propofol Infusion 1.662 MICROgram(s)/kG/Min (1 mL/Hr) IV Continuous <Continuous>  fentaNYL   Infusion 0.1 MICROgram(s)/kG/Hr (1 mL/Hr) IV Continuous <Continuous>  norepinephrine Infusion 0.005 MICROgram(s)/kG/Min (1 mL/Hr) IV Continuous <Continuous>  fat emulsion 20% Infusion 20 Gram(s) (8.33 mL/Hr) IV Continuous <Continuous>  piperacillin/tazobactam IVPB. 3.375 Gram(s) IV Intermittent every 6 hours  nystatin Powder 1 Application(s) Topical two times a day  vancomycin  IVPB   IV Intermittent   vancomycin  IVPB 1250 milliGRAM(s) IV Intermittent every 12 hours  dextrose 10%. 1000 milliLiter(s) (60 mL/Hr) IV Continuous <Continuous>  Parenteral Nutrition - Adult 1 Each (56 mL/Hr) TPN Continuous <Continuous>  fat emulsion 20% Infusion 20 Gram(s) (8.3 mL/Hr) IV Continuous <Continuous>    MEDICATIONS  (PRN):  ondansetron Injectable 4 milliGRAM(s) IV Push every 6 hours PRN Nausea  sodium chloride 0.9% lock flush 10 milliLiter(s) IV Push every 1 hour PRN After each medication administration  sodium chloride 0.9% lock flush 10 milliLiter(s) IV Push every 12 hours PRN Lumen of catheter NOT used    Vital Signs Last 24 Hrs  T(C): 37.3 (02 Aug 2017 10:00), Max: 38.3 (01 Aug 2017 19:00)  T(F): 99.1 (02 Aug 2017 10:00), Max: 101 (01 Aug 2017 19:00)  HR: 72 (02 Aug 2017 11:06) (70 - 104)  BP: 106/61 (02 Aug 2017 11:06) (77/58 - 106/61)  BP(mean): 77 (02 Aug 2017 11:06) (69 - 78)  RR: 11 (02 Aug 2017 11:06) (10 - 26)  SpO2: 97% (02 Aug 2017 11:06) (91% - 100%)    Mode: AC/ CMV (Assist Control/ Continuous Mandatory Ventilation)  RR (machine): 12  TV (machine): 450  FiO2: 40  PEEP: 10  ITime: 1  MAP: 13  PIP: 25    ABG - ( 02 Aug 2017 05:49 )  pH: 7.34  /  pCO2: 51    /  pO2: 130   / HCO3: 27    / Base Excess: x     /  SaO2: 98        I&O's Detail    01 Aug 2017 07:01  -  02 Aug 2017 07:00  --------------------------------------------------------  IN:    Albumin 5%  - 500 mL: 500 mL    Fat Emulsion 20%: 107.9 mL    fentaNYL  Infusion: 208 mL    IV PiggyBack: 550 mL    norepinephrine Infusion: 615 mL    propofol Infusion: 263 mL    TPN (Total Parenteral Nutrition): 1089.3 mL  Total IN: 3333.2 mL    OUT:    Bulb: 5 mL    Bulb: 15 mL    Bulb: 415 mL    Indwelling Catheter - Urethral: 1595 mL    VAC (Vacuum Assisted Closure) System: 175 mL  Total OUT: 2205 mL    Total NET: 1128.2 mL      02 Aug 2017 07:01  -  02 Aug 2017 11:58  --------------------------------------------------------  IN:    Fat Emulsion 20%: 16.6 mL    fentaNYL  Infusion: 42 mL    IV PiggyBack: 250 mL    norepinephrine Infusion: 67 mL    propofol Infusion: 39 mL    TPN (Total Parenteral Nutrition): 116 mL  Total IN: 530.6 mL    OUT:    Bulb: 90 mL    Indwelling Catheter - Urethral: 320 mL  Total OUT: 410 mL    Total NET: 120.6 mL    PHYSICAL EXAM:  NEURO: Intubated/sedated, responsive to voice & light touch, NAD.  CV: RRR  PULM: Intubated; diminished breath sounds at lung bases bilaterally; B lines at lung apices (R>L).  ABD: Softly distended. LLQ CHECO drain- feculent output. RLQ CHECO drain- SS. Midline Prevena vac- minimal, slightly feculent. R mediastinal drain- minimal output.  : Yu- clear yellow urine.   EXTR: WWP. +edema.   SKIN: Fungal-appearing rash beneath panniculus.     LABS:                        9.4    11.2  )-----------( 165      ( 02 Aug 2017 05:52 )             28.2     143  |  103  |  29<H>  ----------------------------<  141<H>  5.6<H>   |  28  |  1.00    Ca    7.9<L>      02 Aug 2017 10:04  Phos  6.1     08-02  Mg     2.4     08-02    TPro  5.1<L>  /  Alb  1.8<L>  /  TBili  1.0  /  DBili  x   /  AST  10  /  ALT  9<L>  /  AlkPhos  105  08-02    PT/INR - ( 01 Aug 2017 16:56 )   PT: 12.9 sec;   INR: 1.16     PTT - ( 01 Aug 2017 16:56 )  PTT:31.8 sec SICU DAILY PROGRESS NOTE    INTERVAL HPI / OVERNIGHT EVENTS:   8/2: LLQ CHECO drain filled w/ feculent output; prevena vac output slightly feculent. CHECO attached to Pleurex. K 5.8; stopped TPN & started D10; will recheck K. Also checking ioPTH given low phos.   o/n: febrile to 101.1 with low UO and increasing levo requirements - started vanc and stress dose steroids and given 500cc bolus 5% albumin. UO improving, levo decreasing throughout shift. lactate unchanged. patch of erythema noted on left flank- marked with pen. beefy red rash noted in inguinal folds, nystatin powder added.   8/1: d/w  switched from colistin to zosyn, stopped linezolid. cont micafungin --went to OR for washout, also found pocket of pus, , given 2U PRBC, abd closed w/ provena, IR drain removed, new L sided CHECO placed.     MEDICATIONS  (STANDING):  insulin lispro (HumaLOG) corrective regimen sliding scale   SubCutaneous Before meals and at bedtime  dextrose 5%. 1000 milliLiter(s) (50 mL/Hr) IV Continuous <Continuous>  dextrose 50% Injectable 25 Gram(s) IV Push once  sodium chloride 0.9% lock flush 20 milliLiter(s) IV Push once  cyanocobalamin Injectable 1000 MICROGram(s) SubCutaneous every 7 days  dextrose 50% Injectable 12.5 Gram(s) IV Push once  heparin  Injectable 7500 Unit(s) SubCutaneous every 8 hours  fat emulsion 20% Infusion 50 Gram(s) (20.8 mL/Hr) IV Continuous <Continuous>  micafungin IVPB 100 milliGRAM(s) IV Intermittent every 24 hours  micafungin IVPB   IV Intermittent   propofol Infusion 1.662 MICROgram(s)/kG/Min (1 mL/Hr) IV Continuous <Continuous>  fentaNYL   Infusion 0.1 MICROgram(s)/kG/Hr (1 mL/Hr) IV Continuous <Continuous>  norepinephrine Infusion 0.005 MICROgram(s)/kG/Min (1 mL/Hr) IV Continuous <Continuous>  fat emulsion 20% Infusion 20 Gram(s) (8.33 mL/Hr) IV Continuous <Continuous>  piperacillin/tazobactam IVPB. 3.375 Gram(s) IV Intermittent every 6 hours  nystatin Powder 1 Application(s) Topical two times a day  vancomycin  IVPB   IV Intermittent   vancomycin  IVPB 1250 milliGRAM(s) IV Intermittent every 12 hours  dextrose 10%. 1000 milliLiter(s) (60 mL/Hr) IV Continuous <Continuous>  Parenteral Nutrition - Adult 1 Each (56 mL/Hr) TPN Continuous <Continuous>  fat emulsion 20% Infusion 20 Gram(s) (8.3 mL/Hr) IV Continuous <Continuous>    MEDICATIONS  (PRN):  ondansetron Injectable 4 milliGRAM(s) IV Push every 6 hours PRN Nausea  sodium chloride 0.9% lock flush 10 milliLiter(s) IV Push every 1 hour PRN After each medication administration  sodium chloride 0.9% lock flush 10 milliLiter(s) IV Push every 12 hours PRN Lumen of catheter NOT used    Vital Signs Last 24 Hrs  T(C): 37.3 (02 Aug 2017 10:00), Max: 38.3 (01 Aug 2017 19:00)  T(F): 99.1 (02 Aug 2017 10:00), Max: 101 (01 Aug 2017 19:00)  HR: 72 (02 Aug 2017 11:06) (70 - 104)  BP: 106/61 (02 Aug 2017 11:06) (77/58 - 106/61)  BP(mean): 77 (02 Aug 2017 11:06) (69 - 78)  RR: 11 (02 Aug 2017 11:06) (10 - 26)  SpO2: 97% (02 Aug 2017 11:06) (91% - 100%)    Mode: AC/ CMV (Assist Control/ Continuous Mandatory Ventilation)  RR (machine): 12  TV (machine): 450  FiO2: 40  PEEP: 10  ITime: 1  MAP: 13  PIP: 25    ABG - ( 02 Aug 2017 05:49 )  pH: 7.34  /  pCO2: 51    /  pO2: 130   / HCO3: 27    / Base Excess: x     /  SaO2: 98        I&O's Detail    01 Aug 2017 07:01  -  02 Aug 2017 07:00  --------------------------------------------------------  IN:    Albumin 5%  - 500 mL: 500 mL    Fat Emulsion 20%: 107.9 mL    fentaNYL  Infusion: 208 mL    IV PiggyBack: 550 mL    norepinephrine Infusion: 615 mL    propofol Infusion: 263 mL    TPN (Total Parenteral Nutrition): 1089.3 mL  Total IN: 3333.2 mL    OUT:    Bulb: 5 mL    Bulb: 15 mL    Bulb: 415 mL    Indwelling Catheter - Urethral: 1595 mL    VAC (Vacuum Assisted Closure) System: 175 mL  Total OUT: 2205 mL    Total NET: 1128.2 mL      02 Aug 2017 07:01  -  02 Aug 2017 11:58  --------------------------------------------------------  IN:    Fat Emulsion 20%: 16.6 mL    fentaNYL  Infusion: 42 mL    IV PiggyBack: 250 mL    norepinephrine Infusion: 67 mL    propofol Infusion: 39 mL    TPN (Total Parenteral Nutrition): 116 mL  Total IN: 530.6 mL    OUT:    Bulb: 90 mL    Indwelling Catheter - Urethral: 320 mL  Total OUT: 410 mL    Total NET: 120.6 mL    PHYSICAL EXAM:  NEURO: Intubated/sedated, responsive to voice & light touch, NAD.  CV: RRR  PULM: Intubated; diminished breath sounds at lung bases bilaterally; B lines at lung apices (R>L).  ABD: Softly distended. LLQ CHECO drain- feculent output. RLQ CHECO drain- SS. Midline Prevena vac- minimal, slightly feculent.  : Yu- clear yellow urine.   EXTR: WWP. +edema.   SKIN: Fungal-appearing rash beneath panniculus.     LABS:                        9.4    11.2  )-----------( 165      ( 02 Aug 2017 05:52 )             28.2     143  |  103  |  29<H>  ----------------------------<  141<H>  5.6<H>   |  28  |  1.00    Ca    7.9<L>      02 Aug 2017 10:04  Phos  6.1     08-02  Mg     2.4     08-02    TPro  5.1<L>  /  Alb  1.8<L>  /  TBili  1.0  /  DBili  x   /  AST  10  /  ALT  9<L>  /  AlkPhos  105  08-02    PT/INR - ( 01 Aug 2017 16:56 )   PT: 12.9 sec;   INR: 1.16     PTT - ( 01 Aug 2017 16:56 )  PTT:31.8 sec SICU DAILY PROGRESS NOTE    INTERVAL HPI / OVERNIGHT EVENTS:   8/2: LLQ CHECO drain filled w/ feculent output; prevena vac output slightly feculent. CHECO attached to Pleurex. K 5.8; stopped TPN & started D10; will recheck K. Also checking ioPTH given low phos.   o/n: febrile to 101.1 with low UO and increasing levo requirements - started vanc and stress dose steroids and given 500cc bolus 5% albumin. UO improving, levo decreasing throughout shift. lactate unchanged. patch of erythema noted on left flank- marked with pen. beefy red rash noted in inguinal folds, nystatin powder added.   8/1: d/w  switched from colistin to zosyn, stopped linezolid. cont micafungin --went to OR for washout, also found pocket of pus, , given 2U PRBC, abd closed w/ provena, IR drain removed, new L sided CHECO placed.     MEDICATIONS  (STANDING):  insulin lispro (HumaLOG) corrective regimen sliding scale   SubCutaneous Before meals and at bedtime  dextrose 5%. 1000 milliLiter(s) (50 mL/Hr) IV Continuous <Continuous>  dextrose 50% Injectable 25 Gram(s) IV Push once  sodium chloride 0.9% lock flush 20 milliLiter(s) IV Push once  cyanocobalamin Injectable 1000 MICROGram(s) SubCutaneous every 7 days  dextrose 50% Injectable 12.5 Gram(s) IV Push once  heparin  Injectable 7500 Unit(s) SubCutaneous every 8 hours  fat emulsion 20% Infusion 50 Gram(s) (20.8 mL/Hr) IV Continuous <Continuous>  micafungin IVPB 100 milliGRAM(s) IV Intermittent every 24 hours  micafungin IVPB   IV Intermittent   propofol Infusion 1.662 MICROgram(s)/kG/Min (1 mL/Hr) IV Continuous <Continuous>  fentaNYL   Infusion 0.1 MICROgram(s)/kG/Hr (1 mL/Hr) IV Continuous <Continuous>  norepinephrine Infusion 0.005 MICROgram(s)/kG/Min (1 mL/Hr) IV Continuous <Continuous>  fat emulsion 20% Infusion 20 Gram(s) (8.33 mL/Hr) IV Continuous <Continuous>  piperacillin/tazobactam IVPB. 3.375 Gram(s) IV Intermittent every 6 hours  nystatin Powder 1 Application(s) Topical two times a day  vancomycin  IVPB   IV Intermittent   vancomycin  IVPB 1250 milliGRAM(s) IV Intermittent every 12 hours  dextrose 10%. 1000 milliLiter(s) (60 mL/Hr) IV Continuous <Continuous>  Parenteral Nutrition - Adult 1 Each (56 mL/Hr) TPN Continuous <Continuous>  fat emulsion 20% Infusion 20 Gram(s) (8.3 mL/Hr) IV Continuous <Continuous>    MEDICATIONS  (PRN):  ondansetron Injectable 4 milliGRAM(s) IV Push every 6 hours PRN Nausea  sodium chloride 0.9% lock flush 10 milliLiter(s) IV Push every 1 hour PRN After each medication administration  sodium chloride 0.9% lock flush 10 milliLiter(s) IV Push every 12 hours PRN Lumen of catheter NOT used    Vital Signs Last 24 Hrs  T(C): 37.3 (02 Aug 2017 10:00), Max: 38.3 (01 Aug 2017 19:00)  T(F): 99.1 (02 Aug 2017 10:00), Max: 101 (01 Aug 2017 19:00)  HR: 72 (02 Aug 2017 11:06) (70 - 104)  BP: 106/61 (02 Aug 2017 11:06) (77/58 - 106/61)  BP(mean): 77 (02 Aug 2017 11:06) (69 - 78)  RR: 11 (02 Aug 2017 11:06) (10 - 26)  SpO2: 97% (02 Aug 2017 11:06) (91% - 100%)    Mode: AC/ CMV (Assist Control/ Continuous Mandatory Ventilation)  RR (machine): 12  TV (machine): 450  FiO2: 40  PEEP: 10  ITime: 1  MAP: 13  PIP: 25    ABG - ( 02 Aug 2017 05:49 )  pH: 7.34  /  pCO2: 51    /  pO2: 130   / HCO3: 27    / Base Excess: x     /  SaO2: 98        I&O's Detail    01 Aug 2017 07:01  -  02 Aug 2017 07:00  --------------------------------------------------------  IN:    Albumin 5%  - 500 mL: 500 mL    Fat Emulsion 20%: 107.9 mL    fentaNYL  Infusion: 208 mL    IV PiggyBack: 550 mL    norepinephrine Infusion: 615 mL    propofol Infusion: 263 mL    TPN (Total Parenteral Nutrition): 1089.3 mL  Total IN: 3333.2 mL    OUT:    Bulb: 5 mL    Bulb: 15 mL    Bulb: 415 mL    Indwelling Catheter - Urethral: 1595 mL    VAC (Vacuum Assisted Closure) System: 175 mL  Total OUT: 2205 mL    Total NET: 1128.2 mL      02 Aug 2017 07:01  -  02 Aug 2017 11:58  --------------------------------------------------------  IN:    Fat Emulsion 20%: 16.6 mL    fentaNYL  Infusion: 42 mL    IV PiggyBack: 250 mL    norepinephrine Infusion: 67 mL    propofol Infusion: 39 mL    TPN (Total Parenteral Nutrition): 116 mL  Total IN: 530.6 mL    OUT:    Bulb: 90 mL    Indwelling Catheter - Urethral: 320 mL  Total OUT: 410 mL    Total NET: 120.6 mL    PHYSICAL EXAM:  NEURO: Intubated/sedated, responsive to voice & light touch, NAD.  HEENT: PERRL, MMM  CV: RRR  PULM: Intubated; diminished breath sounds at lung bases bilaterally; B lines at lung apices (R>L).  ABD: Softly distended. LLQ CHECO drain- feculent output. RLQ CHECO drain- SS. Midline Prevena vac- minimal, slightly feculent.  : Yu- clear yellow urine.   EXTR: WWP. +edema.   Vasc: 2+ radial, 1+ DP pulses  MSK: no joint swelling  SKIN: Fungal-appearing rash beneath panniculus. Erythema left flank blanching.    LABS:                        9.4    11.2  )-----------( 165      ( 02 Aug 2017 05:52 )             28.2     143  |  103  |  29<H>  ----------------------------<  141<H>  5.6<H>   |  28  |  1.00    Ca    7.9<L>      02 Aug 2017 10:04  Phos  6.1     08-02  Mg     2.4     08-02    TPro  5.1<L>  /  Alb  1.8<L>  /  TBili  1.0  /  DBili  x   /  AST  10  /  ALT  9<L>  /  AlkPhos  105  08-02    PT/INR - ( 01 Aug 2017 16:56 )   PT: 12.9 sec;   INR: 1.16     PTT - ( 01 Aug 2017 16:56 )  PTT:31.8 sec

## 2017-08-03 LAB
24R-OH-CALCIDIOL SERPL-MCNC: 12.5 NG/ML — LOW (ref 30–100)
24R-OH-CALCIDIOL SERPL-MCNC: 13.7 NG/ML — LOW (ref 30–100)
ANION GAP SERPL CALC-SCNC: 11 MMOL/L — SIGNIFICANT CHANGE UP (ref 5–17)
BUN SERPL-MCNC: 28 MG/DL — HIGH (ref 7–23)
CALCIUM SERPL-MCNC: 7.4 MG/DL — LOW (ref 8.4–10.5)
CALCIUM SERPL-MCNC: 8.2 MG/DL — LOW (ref 8.4–10.5)
CHLORIDE SERPL-SCNC: 102 MMOL/L — SIGNIFICANT CHANGE UP (ref 96–108)
CO2 SERPL-SCNC: 27 MMOL/L — SIGNIFICANT CHANGE UP (ref 22–31)
CREAT SERPL-MCNC: 1 MG/DL — SIGNIFICANT CHANGE UP (ref 0.5–1.3)
CULTURE RESULTS: SIGNIFICANT CHANGE UP
GLUCOSE SERPL-MCNC: 115 MG/DL — HIGH (ref 70–99)
GRAM STN FLD: SIGNIFICANT CHANGE UP
HCT VFR BLD CALC: 25.3 % — LOW (ref 34.5–45)
HGB BLD-MCNC: 8.1 G/DL — LOW (ref 11.5–15.5)
MAGNESIUM SERPL-MCNC: 2.5 MG/DL — SIGNIFICANT CHANGE UP (ref 1.6–2.6)
MCHC RBC-ENTMCNC: 29.1 PG — SIGNIFICANT CHANGE UP (ref 27–34)
MCHC RBC-ENTMCNC: 32 G/DL — SIGNIFICANT CHANGE UP (ref 32–36)
MCV RBC AUTO: 91 FL — SIGNIFICANT CHANGE UP (ref 80–100)
ORGANISM # SPEC MICROSCOPIC CNT: SIGNIFICANT CHANGE UP
PHOSPHATE SERPL-MCNC: 5.1 MG/DL — HIGH (ref 2.5–4.5)
PLATELET # BLD AUTO: 177 K/UL — SIGNIFICANT CHANGE UP (ref 150–400)
POTASSIUM SERPL-MCNC: 3.9 MMOL/L — SIGNIFICANT CHANGE UP (ref 3.5–5.3)
POTASSIUM SERPL-SCNC: 3.9 MMOL/L — SIGNIFICANT CHANGE UP (ref 3.5–5.3)
PTH-INTACT FLD-MCNC: 112 PG/ML — HIGH (ref 15–65)
RBC # BLD: 2.78 M/UL — LOW (ref 3.8–5.2)
RBC # FLD: 16.2 % — SIGNIFICANT CHANGE UP (ref 10.3–16.9)
SODIUM SERPL-SCNC: 140 MMOL/L — SIGNIFICANT CHANGE UP (ref 135–145)
SPECIMEN SOURCE: SIGNIFICANT CHANGE UP
SPECIMEN SOURCE: SIGNIFICANT CHANGE UP
VIT D25+D1,25 OH+D1,25 PNL SERPL-MCNC: 7.3 PG/ML — LOW (ref 19.9–79.3)
WBC # BLD: 7.4 K/UL — SIGNIFICANT CHANGE UP (ref 3.8–10.5)
WBC # FLD AUTO: 7.4 K/UL — SIGNIFICANT CHANGE UP (ref 3.8–10.5)

## 2017-08-03 PROCEDURE — 99291 CRITICAL CARE FIRST HOUR: CPT

## 2017-08-03 PROCEDURE — 71010: CPT | Mod: 26

## 2017-08-03 RX ORDER — I.V. FAT EMULSION 20 G/100ML
20 EMULSION INTRAVENOUS
Qty: 0 | Refills: 0 | Status: DISCONTINUED | OUTPATIENT
Start: 2017-08-03 | End: 2017-08-03

## 2017-08-03 RX ORDER — ELECTROLYTE SOLUTION,INJ
1 VIAL (ML) INTRAVENOUS
Qty: 0 | Refills: 0 | Status: DISCONTINUED | OUTPATIENT
Start: 2017-08-03 | End: 2017-08-03

## 2017-08-03 RX ORDER — FUROSEMIDE 40 MG
40 TABLET ORAL ONCE
Qty: 0 | Refills: 0 | Status: COMPLETED | OUTPATIENT
Start: 2017-08-03 | End: 2017-08-03

## 2017-08-03 RX ADMIN — HEPARIN SODIUM 7500 UNIT(S): 5000 INJECTION INTRAVENOUS; SUBCUTANEOUS at 18:27

## 2017-08-03 RX ADMIN — FENTANYL CITRATE 1 MICROGRAM(S)/KG/HR: 50 INJECTION INTRAVENOUS at 09:42

## 2017-08-03 RX ADMIN — PIPERACILLIN AND TAZOBACTAM 200 GRAM(S): 4; .5 INJECTION, POWDER, LYOPHILIZED, FOR SOLUTION INTRAVENOUS at 18:29

## 2017-08-03 RX ADMIN — PIPERACILLIN AND TAZOBACTAM 200 GRAM(S): 4; .5 INJECTION, POWDER, LYOPHILIZED, FOR SOLUTION INTRAVENOUS at 13:13

## 2017-08-03 RX ADMIN — MICAFUNGIN SODIUM 105 MILLIGRAM(S): 100 INJECTION, POWDER, LYOPHILIZED, FOR SOLUTION INTRAVENOUS at 13:12

## 2017-08-03 RX ADMIN — Medication 40 MILLIGRAM(S): at 11:29

## 2017-08-03 RX ADMIN — NYSTATIN CREAM 1 APPLICATION(S): 100000 CREAM TOPICAL at 18:28

## 2017-08-03 RX ADMIN — PIPERACILLIN AND TAZOBACTAM 200 GRAM(S): 4; .5 INJECTION, POWDER, LYOPHILIZED, FOR SOLUTION INTRAVENOUS at 23:01

## 2017-08-03 RX ADMIN — HEPARIN SODIUM 7500 UNIT(S): 5000 INJECTION INTRAVENOUS; SUBCUTANEOUS at 10:40

## 2017-08-03 RX ADMIN — PANTOPRAZOLE SODIUM 40 MILLIGRAM(S): 20 TABLET, DELAYED RELEASE ORAL at 13:12

## 2017-08-03 RX ADMIN — Medication 40 MILLIGRAM(S): at 20:19

## 2017-08-03 RX ADMIN — PIPERACILLIN AND TAZOBACTAM 200 GRAM(S): 4; .5 INJECTION, POWDER, LYOPHILIZED, FOR SOLUTION INTRAVENOUS at 04:40

## 2017-08-03 RX ADMIN — NYSTATIN CREAM 1 APPLICATION(S): 100000 CREAM TOPICAL at 06:01

## 2017-08-03 RX ADMIN — HEPARIN SODIUM 7500 UNIT(S): 5000 INJECTION INTRAVENOUS; SUBCUTANEOUS at 02:25

## 2017-08-03 RX ADMIN — PROPOFOL 1 MICROGRAM(S)/KG/MIN: 10 INJECTION, EMULSION INTRAVENOUS at 03:40

## 2017-08-03 RX ADMIN — LINEZOLID 300 MILLIGRAM(S): 600 INJECTION, SOLUTION INTRAVENOUS at 06:45

## 2017-08-03 RX ADMIN — LINEZOLID 300 MILLIGRAM(S): 600 INJECTION, SOLUTION INTRAVENOUS at 18:28

## 2017-08-03 NOTE — PROGRESS NOTE ADULT - SUBJECTIVE AND OBJECTIVE BOX
SICU DAILY PROGRESS NOTE    INTERVAL HPI / OVERNIGHT EVENTS:   ON: no issues am k improved to 3.9 however phos elevated to 5.1. levo down to 2.5   /: LLQ CHECO drain filled w/ feculent output; prevena vac output slightly feculent. CHECO attached to water seal. K 5.8; stopped TPN & started D10. Elevated phos 6.1 & . Midline Prevena vac and sutures removed (retention sutures left in place); WTD dressing applied. OR peritoneal fluid cx growing Ecoli & Enterococcus faecium sensitive to Zyvox..     MEDICATIONS  (STANDING):  insulin lispro (HumaLOG) corrective regimen sliding scale   SubCutaneous Before meals and at bedtime  dextrose 5%. 1000 milliLiter(s) (50 mL/Hr) IV Continuous <Continuous>  dextrose 50% Injectable 25 Gram(s) IV Push once  sodium chloride 0.9% lock flush 20 milliLiter(s) IV Push once  cyanocobalamin Injectable 1000 MICROGram(s) SubCutaneous every 7 days  dextrose 50% Injectable 12.5 Gram(s) IV Push once  heparin  Injectable 7500 Unit(s) SubCutaneous every 8 hours  fat emulsion 20% Infusion 50 Gram(s) (20.8 mL/Hr) IV Continuous <Continuous>  micafungin IVPB 100 milliGRAM(s) IV Intermittent every 24 hours  micafungin IVPB   IV Intermittent   propofol Infusion 1.662 MICROgram(s)/kG/Min (1 mL/Hr) IV Continuous <Continuous>  fentaNYL   Infusion 0.1 MICROgram(s)/kG/Hr (1 mL/Hr) IV Continuous <Continuous>  norepinephrine Infusion 0.005 MICROgram(s)/kG/Min (1 mL/Hr) IV Continuous <Continuous>  piperacillin/tazobactam IVPB. 3.375 Gram(s) IV Intermittent every 6 hours  nystatin Powder 1 Application(s) Topical two times a day  Parenteral Nutrition - Adult 1 Each (56 mL/Hr) TPN Continuous <Continuous>  fat emulsion 20% Infusion 20 Gram(s) (8.33 mL/Hr) IV Continuous <Continuous>  pantoprazole  Injectable 40 milliGRAM(s) IV Push daily  linezolid  IVPB 600 milliGRAM(s) IV Intermittent every 12 hours    MEDICATIONS  (PRN):  ondansetron Injectable 4 milliGRAM(s) IV Push every 6 hours PRN Nausea  sodium chloride 0.9% lock flush 10 milliLiter(s) IV Push every 1 hour PRN After each medication administration  sodium chloride 0.9% lock flush 10 milliLiter(s) IV Push every 12 hours PRN Lumen of catheter NOT used    Vital Signs Last 24 Hrs  T(C): 35.7 (03 Aug 2017 05:35), Max: 37.3 (02 Aug 2017 10:00)  T(F): 96.2 (03 Aug 2017 05:35), Max: 99.1 (02 Aug 2017 10:00)  HR: 56 (03 Aug 2017 08:00) (54 - 88)  BP: 131/77 (03 Aug 2017 08:00) (97/63 - 146/82)  BP(mean): 95 (03 Aug 2017 08:00) (74 - 103)  RR: 10 (03 Aug 2017 08:00) (10 - 19)  SpO2: 100% (03 Aug 2017 08:00) (95% - 100%)    Mode: AC/ CMV (Assist Control/ Continuous Mandatory Ventilation)  RR (machine): 12  TV (machine): 450  FiO2: 40  PEEP: 10  ITime: 1  MAP: 13  PIP: 26    ABG - ( 02 Aug 2017 05:49 )  pH: 7.34  /  pCO2: 51    /  pO2: 130   / HCO3: 27    / Base Excess: x     /  SaO2: 98          I&O's Detail    02 Aug 2017 07:01  -  03 Aug 2017 07:00  --------------------------------------------------------  IN:    Fat Emulsion 20%: 81.5 mL    fentaNYL  Infusion: 222 mL    IV PiggyBack: 900 mL    norepinephrine Infusion: 263 mL    propofol Infusion: 269 mL    TPN (Total Parenteral Nutrition): 788 mL  Total IN: 2523.5 mL    OUT:    Bulb: 410 mL    Indwelling Catheter - Urethral: 1800 mL  Total OUT: 2210 mL    Total NET: 313.5 mL    PHYSICAL EXAM:  NEURO: Intubated/sedated, responsive to voice & light touch, NAD.  HEENT: PERRL, MMM  CV: RRR  PULM: Intubated; diminished breath sounds at lung bases bilaterally; B lines at lung apices (R>L).  ABD: Softly distended. LLQ CHECO drain- feculent output. RLQ CHECO drain- SS. Midline Prevena vac- minimal, slightly feculent.  : Yu- clear yellow urine.   EXTR: WWP. Increased edema.   Vasc: 2+ radial, 1+ DP pulses  MSK: no joint swelling  SKIN: Fungal-appearing rash beneath panniculus. Erythema left flank blanching.    LABS:                        8.1    7.4   )-----------( 177      ( 03 Aug 2017 04:16 )             25.3     140  |  102  |  28<H>  ----------------------------<  115<H>  3.9   |  27  |  1.00    Ca    8.2<L>      03 Aug 2017 04:16  Phos  5.1     08-03  Mg     2.5     08-03    TPro  5.1<L>  /  Alb  1.8<L>  /  TBili  1.0  /  DBili  x   /  AST  10  /  ALT  9<L>  /  AlkPhos  105  08-02    PT/INR - ( 01 Aug 2017 16:56 )   PT: 12.9 sec;   INR: 1.16          PTT - ( 01 Aug 2017 16:56 )  PTT:31.8 sec  Urinalysis Basic - ( 02 Aug 2017 17:31 )    Color: Yellow / Appearance: Clear / S.010 / pH: x  Gluc: x / Ketone: NEGATIVE  / Bili: NEGATIVE / Urobili: 0.2 E.U./dL   Blood: x / Protein: Trace mg/dL / Nitrite: NEGATIVE   Leuk Esterase: NEGATIVE / RBC: x / WBC < 5 /HPF   Sq Epi: x / Non Sq Epi: Few /HPF / Bacteria: Present /HPF SICU DAILY PROGRESS NOTE    INTERVAL HPI / OVERNIGHT EVENTS:   ON: no issues am k improved to 3.9 however phos elevated to 5.1. levo down to 2.5   /: LLQ CHECO drain filled w/ feculent output; prevena vac output slightly feculent. CHECO attached to water seal. K 5.8; stopped TPN & started D10. Elevated phos 6.1 & . Midline Prevena vac and sutures removed (retention sutures left in place); WTD dressing applied. OR peritoneal fluid cx growing Ecoli & Enterococcus faecium sensitive to Zyvox..     MEDICATIONS  (STANDING):  insulin lispro (HumaLOG) corrective regimen sliding scale   SubCutaneous Before meals and at bedtime  dextrose 5%. 1000 milliLiter(s) (50 mL/Hr) IV Continuous <Continuous>  dextrose 50% Injectable 25 Gram(s) IV Push once  sodium chloride 0.9% lock flush 20 milliLiter(s) IV Push once  cyanocobalamin Injectable 1000 MICROGram(s) SubCutaneous every 7 days  dextrose 50% Injectable 12.5 Gram(s) IV Push once  heparin  Injectable 7500 Unit(s) SubCutaneous every 8 hours  fat emulsion 20% Infusion 50 Gram(s) (20.8 mL/Hr) IV Continuous <Continuous>  micafungin IVPB 100 milliGRAM(s) IV Intermittent every 24 hours  micafungin IVPB   IV Intermittent   propofol Infusion 1.662 MICROgram(s)/kG/Min (1 mL/Hr) IV Continuous <Continuous>  fentaNYL   Infusion 0.1 MICROgram(s)/kG/Hr (1 mL/Hr) IV Continuous <Continuous>  norepinephrine Infusion 0.005 MICROgram(s)/kG/Min (1 mL/Hr) IV Continuous <Continuous>  piperacillin/tazobactam IVPB. 3.375 Gram(s) IV Intermittent every 6 hours  nystatin Powder 1 Application(s) Topical two times a day  Parenteral Nutrition - Adult 1 Each (56 mL/Hr) TPN Continuous <Continuous>  fat emulsion 20% Infusion 20 Gram(s) (8.33 mL/Hr) IV Continuous <Continuous>  pantoprazole  Injectable 40 milliGRAM(s) IV Push daily  linezolid  IVPB 600 milliGRAM(s) IV Intermittent every 12 hours    MEDICATIONS  (PRN):  ondansetron Injectable 4 milliGRAM(s) IV Push every 6 hours PRN Nausea  sodium chloride 0.9% lock flush 10 milliLiter(s) IV Push every 1 hour PRN After each medication administration  sodium chloride 0.9% lock flush 10 milliLiter(s) IV Push every 12 hours PRN Lumen of catheter NOT used    Vital Signs Last 24 Hrs  T(C): 35.7 (03 Aug 2017 05:35), Max: 37.3 (02 Aug 2017 10:00)  T(F): 96.2 (03 Aug 2017 05:35), Max: 99.1 (02 Aug 2017 10:00)  HR: 56 (03 Aug 2017 08:00) (54 - 88)  BP: 131/77 (03 Aug 2017 08:00) (97/63 - 146/82)  BP(mean): 95 (03 Aug 2017 08:00) (74 - 103)  RR: 10 (03 Aug 2017 08:00) (10 - 19)  SpO2: 100% (03 Aug 2017 08:00) (95% - 100%)    Mode: AC/ CMV (Assist Control/ Continuous Mandatory Ventilation)  RR (machine): 12  TV (machine): 450  FiO2: 40  PEEP: 10  ITime: 1  MAP: 13  PIP: 26    ABG - ( 02 Aug 2017 05:49 )  pH: 7.34  /  pCO2: 51    /  pO2: 130   / HCO3: 27    / Base Excess: x     /  SaO2: 98          I&O's Detail    02 Aug 2017 07:01  -  03 Aug 2017 07:00  --------------------------------------------------------  IN:    Fat Emulsion 20%: 81.5 mL    fentaNYL  Infusion: 222 mL    IV PiggyBack: 900 mL    norepinephrine Infusion: 263 mL    propofol Infusion: 269 mL    TPN (Total Parenteral Nutrition): 788 mL  Total IN: 2523.5 mL    OUT:    Bulb: 410 mL    Indwelling Catheter - Urethral: 1800 mL  Total OUT: 2210 mL    Total NET: 313.5 mL    PHYSICAL EXAM:  NEURO: Intubated/sedated, responsive to voice & light touch, NAD.  HEENT: PERRL, MMM  CV: RRR  PULM: Intubated; diminished breath sounds at lung bases bilaterally; B lines at lung apices (R>L).  ABD: Softly distended. LLQ CHECO drain- feculent output. RLQ CHECO drain- SS. Midline Prevena vac- minimal, slightly feculent.  : Yu- clear yellow urine.   EXTR: WWP. Increased edema 3+.   Vasc: 2+ radial, 1+ DP pulses  MSK: no joint swelling  SKIN: Fungal-appearing rash beneath panniculus. Erythema left flank blanching is improved.    LABS:                        8.1    7.4   )-----------( 177      ( 03 Aug 2017 04:16 )             25.3     140  |  102  |  28<H>  ----------------------------<  115<H>  3.9   |  27  |  1.00    Ca    8.2<L>      03 Aug 2017 04:16  Phos  5.1     08-03  Mg     2.5     08-03    TPro  5.1<L>  /  Alb  1.8<L>  /  TBili  1.0  /  DBili  x   /  AST  10  /  ALT  9<L>  /  AlkPhos  105  08-02    PT/INR - ( 01 Aug 2017 16:56 )   PT: 12.9 sec;   INR: 1.16          PTT - ( 01 Aug 2017 16:56 )  PTT:31.8 sec  Urinalysis Basic - ( 02 Aug 2017 17:31 )    Color: Yellow / Appearance: Clear / S.010 / pH: x  Gluc: x / Ketone: NEGATIVE  / Bili: NEGATIVE / Urobili: 0.2 E.U./dL   Blood: x / Protein: Trace mg/dL / Nitrite: NEGATIVE   Leuk Esterase: NEGATIVE / RBC: x / WBC < 5 /HPF   Sq Epi: x / Non Sq Epi: Few /HPF / Bacteria: Present /HPF

## 2017-08-03 NOTE — PROGRESS NOTE ADULT - ASSESSMENT
57 F s/p  SBR resection, fistula resection, esophagojejunostomy revision w/ post-op course complicated by RTOR for distal anastomosis breakdown, ATN, acute respiratory failure, & septic shock.     Neuro: Propofol gtt, Fentanyl gtt  CV: MAP 65, Levo gtt  Resp: AC//40/12/10  GI/FEN: NPO, TPN/lipids, Vit B12, Protonix  : Yu  Endo: ISS  ID: anastamotic leak: zosyn (8/1--), micafungin (7/29-),  linezolid (7/25-8/1; 8/2-)  ///dc: Perioperative Gent/Vanc, colistin (7/29-8/1), vanc (8/1-8/2),  Heme/PPX: SCDs, SQH  Lines: [place] (7/24-), L basilic vein PICC (7/25-), L IJ TLC (7/30-)  Drains: RLQ CHECO to bulb suction, LLQ CHECO to water seal. WTD dressing to midline wound.  Wounds: Midline xiphoid to pubis incision w/ retention sutures. 57 F s/p  SBR resection, fistula resection, esophagojejunostomy revision w/ post-op course complicated by RTOR for distal anastomosis breakdown, ATN, acute respiratory failure, & septic shock.     Neuro: Propofol gtt, Fentanyl gtt  CV: MAP 70, Levo gtt dose has been decreasing overall. Will try to diurese.  Resp: AC//40/12/10  GI/FEN: NPO, TPN/lipids, Vit B12, Protonix. Adding octreotide to TPN  : Yu. Await 24 our urine creatinineRenal indices otherwise stable and K/PO4 improved.  Endo: ISS  ID: anastamotic leak: zosyn (8/1--), micafungin (7/29-),  linezolid (7/25-8/1; 8/2-)  ///dc: Perioperative Gent/Vanc, colistin (7/29-8/1), vanc (8/1-8/2),  Heme/PPX: SCDs, SQH  Lines: [place] (7/24-), L basilic vein PICC (7/25-), L IJ TLC (7/30-)  Drains: RLQ CHECO to bulb suction, LLQ CHECO to water seal. WTD dressing to midline wound.  Wounds: Midline xiphoid to pubis incision w/ retention sutures.

## 2017-08-03 NOTE — PROGRESS NOTE ADULT - SUBJECTIVE AND OBJECTIVE BOX
I have seen and examined the patient with the ICU staff. She is slowly improving hemodynamically. WBC is trending down to normal. Abdominal wall erythema has improved. Output from the left drain is bilious and has low output over 24 hours. I think we can hold off on surgical re-exploration.     Plan:  1) IV antibiotiics  2) Wean pressor support as tolerated  3) Monitor drain output  4) Slow down fistula output with ocreotide.  5) Local wound care

## 2017-08-04 LAB
-  AMPICILLIN/SULBACTAM: SIGNIFICANT CHANGE UP
-  AMPICILLIN/SULBACTAM: SIGNIFICANT CHANGE UP
-  AMPICILLIN: SIGNIFICANT CHANGE UP
-  AMPICILLIN: SIGNIFICANT CHANGE UP
-  CEFAZOLIN: SIGNIFICANT CHANGE UP
-  CEFAZOLIN: SIGNIFICANT CHANGE UP
-  CEFTRIAXONE: SIGNIFICANT CHANGE UP
-  CEFTRIAXONE: SIGNIFICANT CHANGE UP
-  CIPROFLOXACIN: SIGNIFICANT CHANGE UP
-  CIPROFLOXACIN: SIGNIFICANT CHANGE UP
-  GENTAMICIN: SIGNIFICANT CHANGE UP
-  GENTAMICIN: SIGNIFICANT CHANGE UP
-  PIPERACILLIN/TAZOBACTAM: SIGNIFICANT CHANGE UP
-  PIPERACILLIN/TAZOBACTAM: SIGNIFICANT CHANGE UP
-  TOBRAMYCIN: SIGNIFICANT CHANGE UP
-  TOBRAMYCIN: SIGNIFICANT CHANGE UP
-  TRIMETHOPRIM/SULFAMETHOXAZOLE: SIGNIFICANT CHANGE UP
-  TRIMETHOPRIM/SULFAMETHOXAZOLE: SIGNIFICANT CHANGE UP
ANION GAP SERPL CALC-SCNC: 13 MMOL/L — SIGNIFICANT CHANGE UP (ref 5–17)
BUN SERPL-MCNC: 31 MG/DL — HIGH (ref 7–23)
CALCIUM SERPL-MCNC: 8.7 MG/DL — SIGNIFICANT CHANGE UP (ref 8.4–10.5)
CHLORIDE SERPL-SCNC: 102 MMOL/L — SIGNIFICANT CHANGE UP (ref 96–108)
CO2 SERPL-SCNC: 31 MMOL/L — SIGNIFICANT CHANGE UP (ref 22–31)
COLLECT DURATION TIME UR: 24 HR — SIGNIFICANT CHANGE UP
CREAT SERPL-MCNC: 1.1 MG/DL — SIGNIFICANT CHANGE UP (ref 0.5–1.3)
CULTURE RESULTS: SIGNIFICANT CHANGE UP
GLUCOSE SERPL-MCNC: 132 MG/DL — HIGH (ref 70–99)
HCT VFR BLD CALC: 26.2 % — LOW (ref 34.5–45)
HGB BLD-MCNC: 8.6 G/DL — LOW (ref 11.5–15.5)
MAGNESIUM SERPL-MCNC: 2.2 MG/DL — SIGNIFICANT CHANGE UP (ref 1.6–2.6)
MCHC RBC-ENTMCNC: 29.3 PG — SIGNIFICANT CHANGE UP (ref 27–34)
MCHC RBC-ENTMCNC: 32.8 G/DL — SIGNIFICANT CHANGE UP (ref 32–36)
MCV RBC AUTO: 89.1 FL — SIGNIFICANT CHANGE UP (ref 80–100)
METHOD TYPE: SIGNIFICANT CHANGE UP
METHOD TYPE: SIGNIFICANT CHANGE UP
ORGANISM # SPEC MICROSCOPIC CNT: SIGNIFICANT CHANGE UP
PHOSPHATE SERPL-MCNC: 4 MG/DL — SIGNIFICANT CHANGE UP (ref 2.5–4.5)
PLATELET # BLD AUTO: 188 K/UL — SIGNIFICANT CHANGE UP (ref 150–400)
POTASSIUM SERPL-MCNC: 3.5 MMOL/L — SIGNIFICANT CHANGE UP (ref 3.5–5.3)
POTASSIUM SERPL-SCNC: 3.5 MMOL/L — SIGNIFICANT CHANGE UP (ref 3.5–5.3)
RBC # BLD: 2.94 M/UL — LOW (ref 3.8–5.2)
RBC # FLD: 16.5 % — SIGNIFICANT CHANGE UP (ref 10.3–16.9)
SODIUM SERPL-SCNC: 146 MMOL/L — HIGH (ref 135–145)
SPECIMEN SOURCE: SIGNIFICANT CHANGE UP
TOTAL VOLUME - 24 HOUR: 3375 ML — SIGNIFICANT CHANGE UP
TRIGL SERPL-MCNC: 194 MG/DL — HIGH (ref 10–149)
URINE CREATININE CALCULATION: 0.5 G/24 H — LOW (ref 0.8–1.8)
VIT D25+D1,25 OH+D1,25 PNL SERPL-MCNC: 8.9 PG/ML — LOW (ref 19.9–79.3)
WBC # BLD: 6.4 K/UL — SIGNIFICANT CHANGE UP (ref 3.8–10.5)
WBC # FLD AUTO: 6.4 K/UL — SIGNIFICANT CHANGE UP (ref 3.8–10.5)

## 2017-08-04 PROCEDURE — 74177 CT ABD & PELVIS W/CONTRAST: CPT | Mod: 26

## 2017-08-04 PROCEDURE — 99291 CRITICAL CARE FIRST HOUR: CPT

## 2017-08-04 PROCEDURE — 71010: CPT | Mod: 26

## 2017-08-04 RX ORDER — ELECTROLYTE SOLUTION,INJ
1 VIAL (ML) INTRAVENOUS
Qty: 0 | Refills: 0 | Status: DISCONTINUED | OUTPATIENT
Start: 2017-08-04 | End: 2017-08-04

## 2017-08-04 RX ORDER — CALCITRIOL 0.5 UG/1
1 CAPSULE ORAL
Qty: 0 | Refills: 0 | Status: DISCONTINUED | OUTPATIENT
Start: 2017-08-04 | End: 2017-12-27

## 2017-08-04 RX ORDER — FENTANYL CITRATE 50 UG/ML
0.1 INJECTION INTRAVENOUS
Qty: 2500 | Refills: 0 | Status: DISCONTINUED | OUTPATIENT
Start: 2017-08-04 | End: 2017-08-11

## 2017-08-04 RX ORDER — DEXTROSE 50 % IN WATER 50 %
12.5 SYRINGE (ML) INTRAVENOUS ONCE
Qty: 0 | Refills: 0 | Status: COMPLETED | OUTPATIENT
Start: 2017-08-04 | End: 2017-08-04

## 2017-08-04 RX ORDER — FUROSEMIDE 40 MG
40 TABLET ORAL ONCE
Qty: 0 | Refills: 0 | Status: COMPLETED | OUTPATIENT
Start: 2017-08-04 | End: 2017-08-04

## 2017-08-04 RX ORDER — ACETAMINOPHEN 500 MG
1000 TABLET ORAL ONCE
Qty: 0 | Refills: 0 | Status: COMPLETED | OUTPATIENT
Start: 2017-08-04 | End: 2017-08-04

## 2017-08-04 RX ORDER — NOREPINEPHRINE BITARTRATE/D5W 8 MG/250ML
0.02 PLASTIC BAG, INJECTION (ML) INTRAVENOUS
Qty: 8 | Refills: 0 | Status: DISCONTINUED | OUTPATIENT
Start: 2017-08-04 | End: 2017-08-06

## 2017-08-04 RX ORDER — I.V. FAT EMULSION 20 G/100ML
20 EMULSION INTRAVENOUS
Qty: 0 | Refills: 0 | Status: DISCONTINUED | OUTPATIENT
Start: 2017-08-04 | End: 2017-08-04

## 2017-08-04 RX ADMIN — PANTOPRAZOLE SODIUM 40 MILLIGRAM(S): 20 TABLET, DELAYED RELEASE ORAL at 12:09

## 2017-08-04 RX ADMIN — HEPARIN SODIUM 7500 UNIT(S): 5000 INJECTION INTRAVENOUS; SUBCUTANEOUS at 09:17

## 2017-08-04 RX ADMIN — HEPARIN SODIUM 7500 UNIT(S): 5000 INJECTION INTRAVENOUS; SUBCUTANEOUS at 01:34

## 2017-08-04 RX ADMIN — CALCITRIOL 1 MICROGRAM(S): 0.5 CAPSULE ORAL at 12:59

## 2017-08-04 RX ADMIN — Medication 40 MILLIGRAM(S): at 18:16

## 2017-08-04 RX ADMIN — Medication 12.5 MILLILITER(S): at 17:30

## 2017-08-04 RX ADMIN — PIPERACILLIN AND TAZOBACTAM 200 GRAM(S): 4; .5 INJECTION, POWDER, LYOPHILIZED, FOR SOLUTION INTRAVENOUS at 10:35

## 2017-08-04 RX ADMIN — Medication 1 EACH: at 18:16

## 2017-08-04 RX ADMIN — HEPARIN SODIUM 7500 UNIT(S): 5000 INJECTION INTRAVENOUS; SUBCUTANEOUS at 17:37

## 2017-08-04 RX ADMIN — NYSTATIN CREAM 1 APPLICATION(S): 100000 CREAM TOPICAL at 06:14

## 2017-08-04 RX ADMIN — LINEZOLID 300 MILLIGRAM(S): 600 INJECTION, SOLUTION INTRAVENOUS at 18:16

## 2017-08-04 RX ADMIN — LINEZOLID 300 MILLIGRAM(S): 600 INJECTION, SOLUTION INTRAVENOUS at 06:16

## 2017-08-04 RX ADMIN — NYSTATIN CREAM 1 APPLICATION(S): 100000 CREAM TOPICAL at 22:45

## 2017-08-04 RX ADMIN — PIPERACILLIN AND TAZOBACTAM 200 GRAM(S): 4; .5 INJECTION, POWDER, LYOPHILIZED, FOR SOLUTION INTRAVENOUS at 17:37

## 2017-08-04 RX ADMIN — PROPOFOL 1 MICROGRAM(S)/KG/MIN: 10 INJECTION, EMULSION INTRAVENOUS at 04:13

## 2017-08-04 RX ADMIN — PIPERACILLIN AND TAZOBACTAM 200 GRAM(S): 4; .5 INJECTION, POWDER, LYOPHILIZED, FOR SOLUTION INTRAVENOUS at 04:58

## 2017-08-04 RX ADMIN — PIPERACILLIN AND TAZOBACTAM 200 GRAM(S): 4; .5 INJECTION, POWDER, LYOPHILIZED, FOR SOLUTION INTRAVENOUS at 22:46

## 2017-08-04 RX ADMIN — I.V. FAT EMULSION 8.33 GRAM(S): 20 EMULSION INTRAVENOUS at 22:46

## 2017-08-04 RX ADMIN — MICAFUNGIN SODIUM 105 MILLIGRAM(S): 100 INJECTION, POWDER, LYOPHILIZED, FOR SOLUTION INTRAVENOUS at 12:10

## 2017-08-04 RX ADMIN — Medication 400 MILLIGRAM(S): at 08:40

## 2017-08-04 RX ADMIN — Medication 2: at 22:40

## 2017-08-04 NOTE — PROGRESS NOTE ADULT - SUBJECTIVE AND OBJECTIVE BOX
Patient is intubated and sedated. She is clinically stable on low dose pressor support. CHECO output bilious but less than 100 ml in 24 hours. Her midline wound has bilious drainage on the guaze. WBC is normal.

## 2017-08-04 NOTE — PROGRESS NOTE ADULT - ATTENDING COMMENTS
I have seen and examined the patient in the SICU with ICU team. We will obtain a CT scan of the abdomen and pelvis with IV contrast to assess for undrained collections.

## 2017-08-04 NOTE — PROGRESS NOTE ADULT - SUBJECTIVE AND OBJECTIVE BOX
SICU DAILY PROGRESS NOTE    INTERVAL HPI / OVERNIGHT EVENTS:   : T 101.1; given Tylenol. CT A/P w/ PO contrast ordered. CrCl 35cc/min. VitD deficient; started calcitriol.   O/N: continued to diurese from pm lasix 40mg  8/3: Lasix 40; added octerotide to TPN,     MEDICATIONS  (STANDING):  insulin lispro (HumaLOG) corrective regimen sliding scale   SubCutaneous Before meals and at bedtime  dextrose 5%. 1000 milliLiter(s) (50 mL/Hr) IV Continuous <Continuous>  dextrose 50% Injectable 25 Gram(s) IV Push once  sodium chloride 0.9% lock flush 20 milliLiter(s) IV Push once  cyanocobalamin Injectable 1000 MICROGram(s) SubCutaneous every 7 days  dextrose 50% Injectable 12.5 Gram(s) IV Push once  heparin  Injectable 7500 Unit(s) SubCutaneous every 8 hours  fat emulsion 20% Infusion 50 Gram(s) (20.8 mL/Hr) IV Continuous <Continuous>  micafungin IVPB 100 milliGRAM(s) IV Intermittent every 24 hours  micafungin IVPB   IV Intermittent   propofol Infusion 1.662 MICROgram(s)/kG/Min (1 mL/Hr) IV Continuous <Continuous>  norepinephrine Infusion 0.005 MICROgram(s)/kG/Min (1 mL/Hr) IV Continuous <Continuous>  piperacillin/tazobactam IVPB. 3.375 Gram(s) IV Intermittent every 6 hours  nystatin Powder 1 Application(s) Topical two times a day  pantoprazole  Injectable 40 milliGRAM(s) IV Push daily  linezolid  IVPB 600 milliGRAM(s) IV Intermittent every 12 hours  Parenteral Nutrition - Adult 1 Each (57 mL/Hr) TPN Continuous <Continuous>  fat emulsion 20% Infusion 20 Gram(s) (8.33 mL/Hr) IV Continuous <Continuous>  fentaNYL   Infusion 0.101 MICROgram(s)/kG/Hr (1 mL/Hr) IV Continuous <Continuous>  Parenteral Nutrition - Adult 1 Each (55 mL/Hr) TPN Continuous <Continuous>  fat emulsion 20% Infusion 20 Gram(s) (8.3 mL/Hr) IV Continuous <Continuous>  calcitriol Injectable 1 MICROGram(s) IV Push <User Schedule>    MEDICATIONS  (PRN):  ondansetron Injectable 4 milliGRAM(s) IV Push every 6 hours PRN Nausea  sodium chloride 0.9% lock flush 10 milliLiter(s) IV Push every 1 hour PRN After each medication administration  sodium chloride 0.9% lock flush 10 milliLiter(s) IV Push every 12 hours PRN Lumen of catheter NOT used      Vital Signs Last 24 Hrs  T(C): 37.2 (04 Aug 2017 12:00), Max: 38.8 (04 Aug 2017 08:40)  T(F): 98.9 (04 Aug 2017 12:00), Max: 101.8 (04 Aug 2017 08:40)  HR: 72 (04 Aug 2017 12:00) (62 - 116)  BP: 114/66 (04 Aug 2017 12:00) (94/58 - 153/80)  BP(mean): 89 (04 Aug 2017 12:00) (69 - 104)  RR: 16 (04 Aug 2017 12:00) (3 - 17)  SpO2: 100% (04 Aug 2017 12:00) (94% - 100%)    Mode: AC/ CMV (Assist Control/ Continuous Mandatory Ventilation)  RR (machine): 12  TV (machine): 450  FiO2: 40  PEEP: 10  ITime: 1  MAP: 16  PIP: 33    I&O's Detail    03 Aug 2017 07:01  -  04 Aug 2017 07:00  --------------------------------------------------------  IN:    Fat Emulsion 20%: 80.6 mL    fentaNYL  Infusion: 234 mL    IV PiggyBack: 1100 mL    norepinephrine Infusion: 115 mL    propofol Infusion: 345 mL    TPN (Total Parenteral Nutrition): 1288 mL  Total IN: 3162.6 mL    OUT:    Bulb: 20 mL    Bulb: 30 mL    Indwelling Catheter - Urethral: 5305 mL  Total OUT: 5355 mL    Total NET: -2192.4 mL      04 Aug 2017 07:01  -  04 Aug 2017 12:44  --------------------------------------------------------  IN:    Fat Emulsion 20%: 32 mL    fentaNYL  Infusion: 44 mL    fentaNYL  Infusion: 11 mL    IV PiggyBack: 100 mL    norepinephrine Infusion: 11 mL    propofol Infusion: 75 mL    TPN (Total Parenteral Nutrition): 280 mL  Total IN: 553 mL    OUT:    Indwelling Catheter - Urethral: 710 mL  Total OUT: 710 mL    Total NET: -157 mL      PHYSICAL EXAM:  NEURO: Intubated/sedated, responsive to voice & light touch, NAD.  HEENT: PERRL, MMM  CV: RRR  PULM: Intubated; diminished breath sounds at lung bases bilaterally; B lines at lung apices (R>L).  ABD: Softly distended. LLQ CHECO drain- feculent output. RLQ mediastinal drain drain- SS. Midline incision w/ retention sutures & WTD dressing- SS & green drainage.  : Yu- clear yellow urine.   EXTR: WWP. Edematous but improving.   SKIN: Fungal-appearing rash beneath panniculus. Erythema left flank blanching is improved.      LABS:                        8.6    6.4   )-----------( 188      ( 04 Aug 2017 04:03 )             26.2     08-04    146<H>  |  102  |  31<H>  ----------------------------<  132<H>  3.5   |  31  |  1.10    Ca    8.7      04 Aug 2017 04:03  Phos  4.0     08-04  Mg     2.2     08-04        Urinalysis Basic - ( 02 Aug 2017 17:31 )    Color: Yellow / Appearance: Clear / S.010 / pH: x  Gluc: x / Ketone: NEGATIVE  / Bili: NEGATIVE / Urobili: 0.2 E.U./dL   Blood: x / Protein: Trace mg/dL / Nitrite: NEGATIVE   Leuk Esterase: NEGATIVE / RBC: x / WBC < 5 /HPF   Sq Epi: x / Non Sq Epi: Few /HPF / Bacteria: Present /HPF SICU DAILY PROGRESS NOTE    INTERVAL HPI / OVERNIGHT EVENTS:   : T 101.1; given Tylenol. CT A/P w/ PO contrast ordered. CrCl 35cc/min. VitD deficient; started calcitriol. ROS not obtainable.  O/N: continued to diurese from pm lasix 40mg  8/3: Lasix 40; added octerotide to TPN,     MEDICATIONS  (STANDING):  insulin lispro (HumaLOG) corrective regimen sliding scale   SubCutaneous Before meals and at bedtime  dextrose 5%. 1000 milliLiter(s) (50 mL/Hr) IV Continuous <Continuous>  dextrose 50% Injectable 25 Gram(s) IV Push once  sodium chloride 0.9% lock flush 20 milliLiter(s) IV Push once  cyanocobalamin Injectable 1000 MICROGram(s) SubCutaneous every 7 days  dextrose 50% Injectable 12.5 Gram(s) IV Push once  heparin  Injectable 7500 Unit(s) SubCutaneous every 8 hours  fat emulsion 20% Infusion 50 Gram(s) (20.8 mL/Hr) IV Continuous <Continuous>  micafungin IVPB 100 milliGRAM(s) IV Intermittent every 24 hours  micafungin IVPB   IV Intermittent   propofol Infusion 1.662 MICROgram(s)/kG/Min (1 mL/Hr) IV Continuous <Continuous>  norepinephrine Infusion 0.005 MICROgram(s)/kG/Min (1 mL/Hr) IV Continuous <Continuous>  piperacillin/tazobactam IVPB. 3.375 Gram(s) IV Intermittent every 6 hours  nystatin Powder 1 Application(s) Topical two times a day  pantoprazole  Injectable 40 milliGRAM(s) IV Push daily  linezolid  IVPB 600 milliGRAM(s) IV Intermittent every 12 hours  Parenteral Nutrition - Adult 1 Each (57 mL/Hr) TPN Continuous <Continuous>  fat emulsion 20% Infusion 20 Gram(s) (8.33 mL/Hr) IV Continuous <Continuous>  fentaNYL   Infusion 0.101 MICROgram(s)/kG/Hr (1 mL/Hr) IV Continuous <Continuous>  Parenteral Nutrition - Adult 1 Each (55 mL/Hr) TPN Continuous <Continuous>  fat emulsion 20% Infusion 20 Gram(s) (8.3 mL/Hr) IV Continuous <Continuous>  calcitriol Injectable 1 MICROGram(s) IV Push <User Schedule>    MEDICATIONS  (PRN):  ondansetron Injectable 4 milliGRAM(s) IV Push every 6 hours PRN Nausea  sodium chloride 0.9% lock flush 10 milliLiter(s) IV Push every 1 hour PRN After each medication administration  sodium chloride 0.9% lock flush 10 milliLiter(s) IV Push every 12 hours PRN Lumen of catheter NOT used      Vital Signs Last 24 Hrs  T(C): 37.2 (04 Aug 2017 12:00), Max: 38.8 (04 Aug 2017 08:40)  T(F): 98.9 (04 Aug 2017 12:00), Max: 101.8 (04 Aug 2017 08:40)  HR: 72 (04 Aug 2017 12:00) (62 - 116)  BP: 114/66 (04 Aug 2017 12:00) (94/58 - 153/80)  BP(mean): 89 (04 Aug 2017 12:00) (69 - 104)  RR: 16 (04 Aug 2017 12:00) (3 - 17)  SpO2: 100% (04 Aug 2017 12:00) (94% - 100%)    Mode: AC/ CMV (Assist Control/ Continuous Mandatory Ventilation)  RR (machine): 12  TV (machine): 450  FiO2: 40  PEEP: 10  ITime: 1  MAP: 16  PIP: 33    I&O's Detail    03 Aug 2017 07:  -  04 Aug 2017 07:00  --------------------------------------------------------  IN:    Fat Emulsion 20%: 80.6 mL    fentaNYL  Infusion: 234 mL    IV PiggyBack: 1100 mL    norepinephrine Infusion: 115 mL    propofol Infusion: 345 mL    TPN (Total Parenteral Nutrition): 1288 mL  Total IN: 3162.6 mL    OUT:    Bulb: 20 mL    Bulb: 30 mL    Indwelling Catheter - Urethral: 5305 mL  Total OUT: 5355 mL    Total NET: -2192.4 mL      04 Aug 2017 07:01  -  04 Aug 2017 12:44  --------------------------------------------------------  IN:    Fat Emulsion 20%: 32 mL    fentaNYL  Infusion: 44 mL    fentaNYL  Infusion: 11 mL    IV PiggyBack: 100 mL    norepinephrine Infusion: 11 mL    propofol Infusion: 75 mL    TPN (Total Parenteral Nutrition): 280 mL  Total IN: 553 mL    OUT:    Indwelling Catheter - Urethral: 710 mL  Total OUT: 710 mL    Total NET: -157 mL      PHYSICAL EXAM:  NEURO: Intubated/sedated, responsive to voice & light touch, NAD.  HEENT: PERRL, MMM  CV: RRR  PULM: Intubated; diminished breath sounds at lung bases bilaterally; B lines at lung apices (R>L).  ABD: Softly distended. LLQ CHECO drain- feculent output. RLQ mediastinal drain drain- SS. Midline incision w/ retention sutures & WTD dressing- SS & green drainage.  : Yu- clear yellow urine.   EXTR: WWP. Edematous but improving  Vasc: 2+ radial, 1+ DP pulses  MSK: no joint swelling.   SKIN: Fungal-appearing rash beneath panniculus. Erythema left flank blanching is improved.      LABS:                        8.6    6.4   )-----------( 188      ( 04 Aug 2017 04:03 )             26.2     08-04    146<H>  |  102  |  31<H>  ----------------------------<  132<H>  3.5   |  31  |  1.10    Ca    8.7      04 Aug 2017 04:03  Phos  4.0     08-04  Mg     2.2     08-04        Urinalysis Basic - ( 02 Aug 2017 17:31 )    Color: Yellow / Appearance: Clear / S.010 / pH: x  Gluc: x / Ketone: NEGATIVE  / Bili: NEGATIVE / Urobili: 0.2 E.U./dL   Blood: x / Protein: Trace mg/dL / Nitrite: NEGATIVE   Leuk Esterase: NEGATIVE / RBC: x / WBC < 5 /HPF   Sq Epi: x / Non Sq Epi: Few /HPF / Bacteria: Present /HPF

## 2017-08-04 NOTE — PROGRESS NOTE ADULT - ASSESSMENT
Patient is a 56yo F s/p  SBR resection, fistula resection, esophagojejunostomy revision w/ post-op course complicated by RTOR for distal anastomosis breakdown, ATN, acute respiratory failure, & septic shock.     #Septic shock: recent peritoneal fluid cx w/ E.coli and Kleb; patient on appropriate abx regimen as per sensitivities however currently spiking fevers; no leukocytosis; prior peritoneal cx on 7/29 w/ growth of E. faecalis sens to linezolid and tetra/doxy; prior ab cx in 12/2016 and 2/2017 w/ yeast   -c/w linezolid 600mg bid, zosyn 3.375mg q6, and micafungin 100mg qd  -obtain sputum gram stain and cx if pt w/ secretions  -obtain CT ab/pelv to assess for possible fluid collections  -obtain blood cx, urine cx if pt spikes fever o/n

## 2017-08-04 NOTE — PROGRESS NOTE ADULT - SUBJECTIVE AND OBJECTIVE BOX
INTERVAL HPI/OVERNIGHT EVENTS: febrile o/n to 101.8; no other events.     VITAL SIGNS:  T(F): 98.6 (08-04-17 @ 17:19)  HR: 74 (08-04-17 @ 18:00)  BP: 100/60 (08-04-17 @ 14:00)  RR: 12 (08-04-17 @ 18:00)  SpO2: 100% (08-04-17 @ 18:00)  Wt(kg): --    PHYSICAL EXAM:    Constitutional: sedated  Eyes: conjunctiva and sclera clear  Head: Normocephalic; atraumatic  ENMT: intubated  Neck: Supple; non tender; no masses  Respiratory: diminished breath sounds b/l  Cardiovascular: Regular rate and rhythm. S1 and S2 Normal; No murmurs, gallops or rubs  Gastrointestinal: Soft non-tender non-distended; Normal bowel sounds; No hepatosplenomegaly; RLQ drain in place w/ serosanguinous contents; site clean; mediastinal drain in place; site clean  Extremities: No clubbing, cyanosis   Vascular: Peripheral pulses palpable 2+ bilaterally  Neurological: sedated  Skin: Warm and dry. No acute rash  Lymph Nodes: No acute cervical adenopathy    MEDICATIONS  (STANDING):  insulin lispro (HumaLOG) corrective regimen sliding scale   SubCutaneous Before meals and at bedtime  dextrose 5%. 1000 milliLiter(s) (50 mL/Hr) IV Continuous <Continuous>  dextrose 50% Injectable 25 Gram(s) IV Push once  sodium chloride 0.9% lock flush 20 milliLiter(s) IV Push once  cyanocobalamin Injectable 1000 MICROGram(s) SubCutaneous every 7 days  dextrose 50% Injectable 12.5 Gram(s) IV Push once  heparin  Injectable 7500 Unit(s) SubCutaneous every 8 hours  fat emulsion 20% Infusion 50 Gram(s) (20.8 mL/Hr) IV Continuous <Continuous>  micafungin IVPB 100 milliGRAM(s) IV Intermittent every 24 hours  micafungin IVPB   IV Intermittent   propofol Infusion 1.662 MICROgram(s)/kG/Min (1 mL/Hr) IV Continuous <Continuous>  piperacillin/tazobactam IVPB. 3.375 Gram(s) IV Intermittent every 6 hours  nystatin Powder 1 Application(s) Topical two times a day  pantoprazole  Injectable 40 milliGRAM(s) IV Push daily  linezolid  IVPB 600 milliGRAM(s) IV Intermittent every 12 hours  Parenteral Nutrition - Adult 1 Each (57 mL/Hr) TPN Continuous <Continuous>  fentaNYL   Infusion 0.101 MICROgram(s)/kG/Hr (1 mL/Hr) IV Continuous <Continuous>  Parenteral Nutrition - Adult 1 Each (55 mL/Hr) TPN Continuous <Continuous>  fat emulsion 20% Infusion 20 Gram(s) (8.33 mL/Hr) IV Continuous <Continuous>  calcitriol Injectable 1 MICROGram(s) IV Push <User Schedule>  dextrose 50% Injectable 12.5 milliLiter(s) IV Push once    MEDICATIONS  (PRN):  ondansetron Injectable 4 milliGRAM(s) IV Push every 6 hours PRN Nausea  sodium chloride 0.9% lock flush 10 milliLiter(s) IV Push every 1 hour PRN After each medication administration  sodium chloride 0.9% lock flush 10 milliLiter(s) IV Push every 12 hours PRN Lumen of catheter NOT used      Allergies    sulfa drugs (Unknown)  sulfamethoxazole (Other)    Intolerances        LABS:                        8.6    6.4   )-----------( 188      ( 04 Aug 2017 04:03 )             26.2     08-04    146<H>  |  102  |  31<H>  ----------------------------<  132<H>  3.5   |  31  |  1.10    Ca    8.7      04 Aug 2017 04:03  Phos  4.0     08-04  Mg     2.2     08-04            RADIOLOGY & ADDITIONAL TESTS:    Culture - Body Fluid with Gram Stain (08.02.17 @ 12:19)    -  Gentamicin: S <=4    -  Gentamicin: S <=4    -  Piperacillin/Tazobactam: S <=16    -  Piperacillin/Tazobactam: S <=16    -  Ampicillin: S <=8    -  Ceftriaxone: S <=1    -  Ceftriaxone: S <=1    -  Trimethoprim/Sulfamethoxazole: S <=2/38    -  Trimethoprim/Sulfamethoxazole: S <=2/38    Gram Stain:   Moderate Gram Negative Rods  Numerous White blood cells    -  Ampicillin: R >16    -  Ampicillin/Sulbactam: S <=8/4    -  Ampicillin/Sulbactam: S <=8/4    -  Cefazolin: S <=8    -  Cefazolin: S <=8    -  Ciprofloxacin: S <=1    -  Ciprofloxacin: S <=1    -  Tobramycin: S <=4    -  Tobramycin: S <=4    Specimen Source: .Body Fluid LEFT lower CHECO    Culture Results:   Numerous Escherichia coli  Few Klebsiella oxytoca    Organism Identification: Escherichia coli  Klebsiella oxytoca    Organism: Escherichia coli    Organism: Klebsiella oxytoca    Method Type: ALISHA    Method Type: ALISHA    Fungus Identification (08.01.17 @ 23:38)    Culture Results:   Culture in progress    Culture - Body Fluid with Gram Stain (07.31.17 @ 15:25)    Gram Stain:   No organisms seen  No White blood cells    Specimen Source: .Body Fluid Peritoneal Fluid    Culture Results:   No growth    Culture - Blood (07.31.17 @ 11:29)    Specimen Source: .Blood Blood-Peripheral    Culture Results:   No growth at 4 days.    Culture - Blood (07.31.17 @ 11:29)    Specimen Source: .Blood Blood-Peripheral    Culture Results:   No growth at 4 days.    Culture - Body Fluid with Gram Stain (07.29.17 @ 21:10)    -  Cefazolin: S <=8    -  Ciprofloxacin: R >2    -  Erythromycin: I 4    -  Linezolid: S 2    -  Ampicillin: R >8    -  Ampicillin/Sulbactam: S <=8/4    -  Ciprofloxacin: S <=1    -  Penicillin: R >8    -  Tetra/Doxy: S <=4    -  Ampicillin: S <=8    -  Gentamicin: S <=4    -  Piperacillin/Tazobactam: S <=16    -  Tobramycin: S <=4    -  Ceftriaxone: S <=1    -  Trimethoprim/Sulfamethoxazole: S <=2/38    Gram Stain:   Numerous Gram Negative Rods  Few White blood cells    Specimen Source: .Body Fluid abdominal fluid    Culture Results:   Numerous Escherichia coli  Rare Yeast ...... Choclate plate send to core lab for ID and sensitivity  see#[id number]  Rare Lactobacillus species  Rare Enterococcus faecium  More than three types of organisms recovered may indicate colonization  and/or contamination.  Please call Microbiology immediately if identification and/or  suceptibility is indicated.    Organism Identification: Escherichia coli  Enterococcus faecium    Organism: Escherichia coli    Organism: Enterococcus faecium    Method Type: ALISHA    Method Type: ALISHA    Culture - Blood (07.25.17 @ 23:16)    Specimen Source: .Blood Blood-Peripheral    Culture Results:   No growth at 5 days.    Culture - Blood (07.25.17 @ 23:16)    Specimen Source: .Blood Blood-Peripheral    Culture Results:   No growth at 5 days.    EXAM:  XR CHEST 1 VIEW PORT ROUTINE                          PROCEDURE DATE:  08/04/2017      IMPRESSION:  Essentially no significant interval changes allowing for differences in   imaging technique. Critical Care      INTERVAL HPI/OVERNIGHT EVENTS: febrile o/n to 101.8; no other events.     VITAL SIGNS:  T(F): 98.6 (08-04-17 @ 17:19)  HR: 74 (08-04-17 @ 18:00)  BP: 100/60 (08-04-17 @ 14:00)  RR: 12 (08-04-17 @ 18:00)  SpO2: 100% (08-04-17 @ 18:00)  Wt(kg): --    PHYSICAL EXAM:    Constitutional: sedated  Eyes: conjunctiva and sclera clear  Head: Normocephalic; atraumatic  ENMT: intubated  Neck: Supple; non tender; no masses  Respiratory: diminished breath sounds b/l  Cardiovascular: Regular rate and rhythm. S1 and S2 Normal; No murmurs, gallops or rubs  Gastrointestinal: Soft non-tender non-distended; Normal bowel sounds; No hepatosplenomegaly; RLQ drain in place w/ serosanguinous contents; site clean; mediastinal drain in place; site clean  Extremities: No clubbing, cyanosis   Vascular: Peripheral pulses palpable 2+ bilaterally  Neurological: sedated  Skin: Warm and dry. No acute rash  Lymph Nodes: No acute cervical adenopathy    MEDICATIONS  (STANDING):  insulin lispro (HumaLOG) corrective regimen sliding scale   SubCutaneous Before meals and at bedtime  dextrose 5%. 1000 milliLiter(s) (50 mL/Hr) IV Continuous <Continuous>  dextrose 50% Injectable 25 Gram(s) IV Push once  sodium chloride 0.9% lock flush 20 milliLiter(s) IV Push once  cyanocobalamin Injectable 1000 MICROGram(s) SubCutaneous every 7 days  dextrose 50% Injectable 12.5 Gram(s) IV Push once  heparin  Injectable 7500 Unit(s) SubCutaneous every 8 hours  fat emulsion 20% Infusion 50 Gram(s) (20.8 mL/Hr) IV Continuous <Continuous>  micafungin IVPB 100 milliGRAM(s) IV Intermittent every 24 hours  micafungin IVPB   IV Intermittent   propofol Infusion 1.662 MICROgram(s)/kG/Min (1 mL/Hr) IV Continuous <Continuous>  piperacillin/tazobactam IVPB. 3.375 Gram(s) IV Intermittent every 6 hours  nystatin Powder 1 Application(s) Topical two times a day  pantoprazole  Injectable 40 milliGRAM(s) IV Push daily  linezolid  IVPB 600 milliGRAM(s) IV Intermittent every 12 hours  Parenteral Nutrition - Adult 1 Each (57 mL/Hr) TPN Continuous <Continuous>  fentaNYL   Infusion 0.101 MICROgram(s)/kG/Hr (1 mL/Hr) IV Continuous <Continuous>  Parenteral Nutrition - Adult 1 Each (55 mL/Hr) TPN Continuous <Continuous>  fat emulsion 20% Infusion 20 Gram(s) (8.33 mL/Hr) IV Continuous <Continuous>  calcitriol Injectable 1 MICROGram(s) IV Push <User Schedule>  dextrose 50% Injectable 12.5 milliLiter(s) IV Push once    MEDICATIONS  (PRN):  ondansetron Injectable 4 milliGRAM(s) IV Push every 6 hours PRN Nausea  sodium chloride 0.9% lock flush 10 milliLiter(s) IV Push every 1 hour PRN After each medication administration  sodium chloride 0.9% lock flush 10 milliLiter(s) IV Push every 12 hours PRN Lumen of catheter NOT used      Allergies    sulfa drugs (Unknown)  sulfamethoxazole (Other)    Intolerances        LABS:                        8.6    6.4   )-----------( 188      ( 04 Aug 2017 04:03 )             26.2     08-04    146<H>  |  102  |  31<H>  ----------------------------<  132<H>  3.5   |  31  |  1.10    Ca    8.7      04 Aug 2017 04:03  Phos  4.0     08-04  Mg     2.2     08-04            RADIOLOGY & ADDITIONAL TESTS:    Culture - Body Fluid with Gram Stain (08.02.17 @ 12:19)    -  Gentamicin: S <=4    -  Gentamicin: S <=4    -  Piperacillin/Tazobactam: S <=16    -  Piperacillin/Tazobactam: S <=16    -  Ampicillin: S <=8    -  Ceftriaxone: S <=1    -  Ceftriaxone: S <=1    -  Trimethoprim/Sulfamethoxazole: S <=2/38    -  Trimethoprim/Sulfamethoxazole: S <=2/38    Gram Stain:   Moderate Gram Negative Rods  Numerous White blood cells    -  Ampicillin: R >16    -  Ampicillin/Sulbactam: S <=8/4    -  Ampicillin/Sulbactam: S <=8/4    -  Cefazolin: S <=8    -  Cefazolin: S <=8    -  Ciprofloxacin: S <=1    -  Ciprofloxacin: S <=1    -  Tobramycin: S <=4    -  Tobramycin: S <=4    Specimen Source: .Body Fluid LEFT lower CHECO    Culture Results:   Numerous Escherichia coli  Few Klebsiella oxytoca    Organism Identification: Escherichia coli  Klebsiella oxytoca    Organism: Escherichia coli    Organism: Klebsiella oxytoca    Method Type: ALISHA    Method Type: ALISHA    Fungus Identification (08.01.17 @ 23:38)    Culture Results:   Culture in progress    Culture - Body Fluid with Gram Stain (07.31.17 @ 15:25)    Gram Stain:   No organisms seen  No White blood cells    Specimen Source: .Body Fluid Peritoneal Fluid    Culture Results:   No growth    Culture - Blood (07.31.17 @ 11:29)    Specimen Source: .Blood Blood-Peripheral    Culture Results:   No growth at 4 days.    Culture - Blood (07.31.17 @ 11:29)    Specimen Source: .Blood Blood-Peripheral    Culture Results:   No growth at 4 days.    Culture - Body Fluid with Gram Stain (07.29.17 @ 21:10)    -  Cefazolin: S <=8    -  Ciprofloxacin: R >2    -  Erythromycin: I 4    -  Linezolid: S 2    -  Ampicillin: R >8    -  Ampicillin/Sulbactam: S <=8/4    -  Ciprofloxacin: S <=1    -  Penicillin: R >8    -  Tetra/Doxy: S <=4    -  Ampicillin: S <=8    -  Gentamicin: S <=4    -  Piperacillin/Tazobactam: S <=16    -  Tobramycin: S <=4    -  Ceftriaxone: S <=1    -  Trimethoprim/Sulfamethoxazole: S <=2/38    Gram Stain:   Numerous Gram Negative Rods  Few White blood cells    Specimen Source: .Body Fluid abdominal fluid    Culture Results:   Numerous Escherichia coli  Rare Yeast ...... Choclate plate send to core lab for ID and sensitivity  see#[id number]  Rare Lactobacillus species  Rare Enterococcus faecium  More than three types of organisms recovered may indicate colonization  and/or contamination.  Please call Microbiology immediately if identification and/or  suceptibility is indicated.    Organism Identification: Escherichia coli  Enterococcus faecium    Organism: Escherichia coli    Organism: Enterococcus faecium    Method Type: ALISHA    Method Type: ALISHA    Culture - Blood (07.25.17 @ 23:16)    Specimen Source: .Blood Blood-Peripheral    Culture Results:   No growth at 5 days.    Culture - Blood (07.25.17 @ 23:16)    Specimen Source: .Blood Blood-Peripheral    Culture Results:   No growth at 5 days.    EXAM:  XR CHEST 1 VIEW PORT ROUTINE                          PROCEDURE DATE:  08/04/2017      IMPRESSION:  Essentially no significant interval changes allowing for differences in   imaging technique.

## 2017-08-04 NOTE — PROGRESS NOTE ADULT - ASSESSMENT
57 F s/p  SBR resection, fistula resection, esophagojejunostomy revision w/ post-op course complicated by RTOR for distal anastomosis breakdown, ATN, acute respiratory failure, & septic shock.     Neuro: Propofol gtt, Fentanyl gtt  CV: MAP 65, Levo gtt  Resp: AC//40/12/10  GI/FEN: NPO, TPN/lipids, Vit B12, Vit D, Protonix  : Yu  Endo: ISS  ID: anastamotic leak: zosyn (8/1--), micafungin (7/29-),  linezolid (7/25-8/1; 8/2-)  ///dc: Perioperative Gent/Vanc, colistin (7/29-8/1), vanc (8/1-8/2),  Heme/PPX: SCDs, SQH  Lines: Radha (7/24-), L basilic vein PICC (7/25-), L IJ TLC (7/30-)  Drains: RLQ mediastinal CHECO to bulb suction, LLQ CHECO to water seal. WTD dressing to midline wound.  Wounds: Midline xiphoid to pubis incision w/ retention sutures. 57 F s/p  SBR resection, fistula resection, esophagojejunostomy revision w/ post-op course complicated by RTOR for distal anastomosis breakdown, ATN, acute respiratory failure, & septic shock.     Neuro: Propofol gtt, Fentanyl gtt  CV: MAP 70, Levo gtt continues to decrease. Continue diuresis but as will get contrast for CT today will resume tomorrow.  Resp: AC//40/12/10  GI/FEN: NPO, TPN/lipids, Vit B12, Vit D, Protonix  : Yu; renal fxn is stable but creatinine clearance based on 24 hour urine is 35 ml/min.  Endo: ISS  ID: anastamotic leak: zosyn (8/1--), micafungin (7/29-),  linezolid (7/25-8/1; 8/2-)  ///dc: Perioperative Gent/Vanc, colistin (7/29-8/1), vanc (8/1-8/2), Will re CT today given fever.  Heme/PPX: SCDs, SQH  Lines: Baxley (7/24-), L basilic vein PICC (7/25-), L IJ TLC (7/30-)  Drains: RLQ mediastinal CHECO to bulb suction, LLQ CHECO to water seal. WTD dressing to midline wound.  Wounds: Midline xiphoid to pubis incision w/ retention sutures.

## 2017-08-05 DIAGNOSIS — R78.81 BACTEREMIA: ICD-10-CM

## 2017-08-05 LAB
ALBUMIN SERPL ELPH-MCNC: 1.4 G/DL — LOW (ref 3.3–5)
ALP SERPL-CCNC: 165 U/L — HIGH (ref 40–120)
ALT FLD-CCNC: 9 U/L — LOW (ref 10–45)
ANION GAP SERPL CALC-SCNC: 13 MMOL/L — SIGNIFICANT CHANGE UP (ref 5–17)
AST SERPL-CCNC: 12 U/L — SIGNIFICANT CHANGE UP (ref 10–40)
BILIRUB SERPL-MCNC: 0.6 MG/DL — SIGNIFICANT CHANGE UP (ref 0.2–1.2)
BUN SERPL-MCNC: 29 MG/DL — HIGH (ref 7–23)
CALCIUM SERPL-MCNC: 8.4 MG/DL — SIGNIFICANT CHANGE UP (ref 8.4–10.5)
CHLORIDE SERPL-SCNC: 100 MMOL/L — SIGNIFICANT CHANGE UP (ref 96–108)
CO2 SERPL-SCNC: 30 MMOL/L — SIGNIFICANT CHANGE UP (ref 22–31)
CREAT SERPL-MCNC: 1 MG/DL — SIGNIFICANT CHANGE UP (ref 0.5–1.3)
CULTURE RESULTS: SIGNIFICANT CHANGE UP
CULTURE RESULTS: SIGNIFICANT CHANGE UP
GLUCOSE SERPL-MCNC: 115 MG/DL — HIGH (ref 70–99)
HCT VFR BLD CALC: 26.6 % — LOW (ref 34.5–45)
HGB BLD-MCNC: 8.4 G/DL — LOW (ref 11.5–15.5)
MAGNESIUM SERPL-MCNC: 2.1 MG/DL — SIGNIFICANT CHANGE UP (ref 1.6–2.6)
MCHC RBC-ENTMCNC: 28.9 PG — SIGNIFICANT CHANGE UP (ref 27–34)
MCHC RBC-ENTMCNC: 31.6 G/DL — LOW (ref 32–36)
MCV RBC AUTO: 91.4 FL — SIGNIFICANT CHANGE UP (ref 80–100)
PHOSPHATE SERPL-MCNC: 4.2 MG/DL — SIGNIFICANT CHANGE UP (ref 2.5–4.5)
PLATELET # BLD AUTO: 216 K/UL — SIGNIFICANT CHANGE UP (ref 150–400)
POTASSIUM SERPL-MCNC: 3.4 MMOL/L — LOW (ref 3.5–5.3)
POTASSIUM SERPL-SCNC: 3.4 MMOL/L — LOW (ref 3.5–5.3)
PROT SERPL-MCNC: 5.5 G/DL — LOW (ref 6–8.3)
RBC # BLD: 2.91 M/UL — LOW (ref 3.8–5.2)
RBC # FLD: 16.2 % — SIGNIFICANT CHANGE UP (ref 10.3–16.9)
SODIUM SERPL-SCNC: 143 MMOL/L — SIGNIFICANT CHANGE UP (ref 135–145)
SPECIMEN SOURCE: SIGNIFICANT CHANGE UP
SPECIMEN SOURCE: SIGNIFICANT CHANGE UP
WBC # BLD: 10 K/UL — SIGNIFICANT CHANGE UP (ref 3.8–10.5)
WBC # FLD AUTO: 10 K/UL — SIGNIFICANT CHANGE UP (ref 3.8–10.5)

## 2017-08-05 PROCEDURE — 99291 CRITICAL CARE FIRST HOUR: CPT

## 2017-08-05 RX ORDER — POTASSIUM CHLORIDE 20 MEQ
20 PACKET (EA) ORAL
Qty: 0 | Refills: 0 | Status: COMPLETED | OUTPATIENT
Start: 2017-08-05 | End: 2017-08-05

## 2017-08-05 RX ORDER — ELECTROLYTE SOLUTION,INJ
1 VIAL (ML) INTRAVENOUS
Qty: 0 | Refills: 0 | Status: DISCONTINUED | OUTPATIENT
Start: 2017-08-05 | End: 2017-08-05

## 2017-08-05 RX ADMIN — PIPERACILLIN AND TAZOBACTAM 200 GRAM(S): 4; .5 INJECTION, POWDER, LYOPHILIZED, FOR SOLUTION INTRAVENOUS at 16:31

## 2017-08-05 RX ADMIN — PIPERACILLIN AND TAZOBACTAM 200 GRAM(S): 4; .5 INJECTION, POWDER, LYOPHILIZED, FOR SOLUTION INTRAVENOUS at 23:19

## 2017-08-05 RX ADMIN — PANTOPRAZOLE SODIUM 40 MILLIGRAM(S): 20 TABLET, DELAYED RELEASE ORAL at 12:17

## 2017-08-05 RX ADMIN — Medication 50 MILLIEQUIVALENT(S): at 18:08

## 2017-08-05 RX ADMIN — Medication 50 MILLIEQUIVALENT(S): at 21:32

## 2017-08-05 RX ADMIN — NYSTATIN CREAM 1 APPLICATION(S): 100000 CREAM TOPICAL at 18:08

## 2017-08-05 RX ADMIN — Medication 1 EACH: at 18:08

## 2017-08-05 RX ADMIN — PROPOFOL 1 MICROGRAM(S)/KG/MIN: 10 INJECTION, EMULSION INTRAVENOUS at 19:14

## 2017-08-05 RX ADMIN — Medication 50 MILLIEQUIVALENT(S): at 15:25

## 2017-08-05 RX ADMIN — PIPERACILLIN AND TAZOBACTAM 200 GRAM(S): 4; .5 INJECTION, POWDER, LYOPHILIZED, FOR SOLUTION INTRAVENOUS at 10:07

## 2017-08-05 RX ADMIN — HEPARIN SODIUM 7500 UNIT(S): 5000 INJECTION INTRAVENOUS; SUBCUTANEOUS at 16:30

## 2017-08-05 RX ADMIN — HEPARIN SODIUM 7500 UNIT(S): 5000 INJECTION INTRAVENOUS; SUBCUTANEOUS at 09:28

## 2017-08-05 RX ADMIN — NYSTATIN CREAM 1 APPLICATION(S): 100000 CREAM TOPICAL at 10:07

## 2017-08-05 RX ADMIN — HEPARIN SODIUM 7500 UNIT(S): 5000 INJECTION INTRAVENOUS; SUBCUTANEOUS at 01:59

## 2017-08-05 RX ADMIN — MICAFUNGIN SODIUM 105 MILLIGRAM(S): 100 INJECTION, POWDER, LYOPHILIZED, FOR SOLUTION INTRAVENOUS at 12:17

## 2017-08-05 RX ADMIN — LINEZOLID 300 MILLIGRAM(S): 600 INJECTION, SOLUTION INTRAVENOUS at 18:55

## 2017-08-05 RX ADMIN — Medication 2: at 21:32

## 2017-08-05 RX ADMIN — PIPERACILLIN AND TAZOBACTAM 200 GRAM(S): 4; .5 INJECTION, POWDER, LYOPHILIZED, FOR SOLUTION INTRAVENOUS at 05:16

## 2017-08-05 RX ADMIN — LINEZOLID 300 MILLIGRAM(S): 600 INJECTION, SOLUTION INTRAVENOUS at 06:25

## 2017-08-05 NOTE — PROGRESS NOTE ADULT - ASSESSMENT
57 F s/p  SBR resection, fistula resection, esophagojejunostomy revision w/ post-op course complicated by RTOR for distal anastomosis breakdown, ATN, acute respiratory failure, & septic shock.     Neuro: Propofol gtt, Fentanyl gtt  CV: MAP 65, Levo gtt  Resp: AC//40/12/10  GI/FEN: NPO, TPN/lipids, Vit B12, Vit D, Protonix  : Yu  Endo: ISS  ID: anastamotic leak: zosyn (8/1--), micafungin (7/29-),  linezolid (7/25-8/1; 8/2-)  ///dc: Perioperative Gent/Vanc, colistin (7/29-8/1), vanc (8/1-8/2),  Heme/PPX: SCDs, SQH  Lines: Radha (7/24-), L basilic vein PICC (7/25-), L IJ TLC (7/30-)  Drains: RLQ mediastinal CHECO to bulb suction, LLQ CHECO to water seal. WTD dressing to midline wound.  Wounds: Midline xiphoid to pubis incision w/ retention sutures.

## 2017-08-05 NOTE — PROGRESS NOTE ADULT - SUBJECTIVE AND OBJECTIVE BOX
INTERVAL HPI/OVERNIGHT EVENTS: O/N: restarted on levophed, CT read: a bunch of Abscesses and splenic infarct.  8/4: T 101.1; given Tylenol. CrCl 35cc/min. VitD deficient; started calcitriol. CT A/P w/ PO contrast performed. Off Levo.       MEDICATIONS  (STANDING):  insulin lispro (HumaLOG) corrective regimen sliding scale   SubCutaneous Before meals and at bedtime  dextrose 5%. 1000 milliLiter(s) (50 mL/Hr) IV Continuous <Continuous>  dextrose 50% Injectable 25 Gram(s) IV Push once  sodium chloride 0.9% lock flush 20 milliLiter(s) IV Push once  cyanocobalamin Injectable 1000 MICROGram(s) SubCutaneous every 7 days  dextrose 50% Injectable 12.5 Gram(s) IV Push once  heparin  Injectable 7500 Unit(s) SubCutaneous every 8 hours  fat emulsion 20% Infusion 50 Gram(s) (20.8 mL/Hr) IV Continuous <Continuous>  micafungin IVPB 100 milliGRAM(s) IV Intermittent every 24 hours  micafungin IVPB   IV Intermittent   propofol Infusion 1.662 MICROgram(s)/kG/Min (1 mL/Hr) IV Continuous <Continuous>  piperacillin/tazobactam IVPB. 3.375 Gram(s) IV Intermittent every 6 hours  nystatin Powder 1 Application(s) Topical two times a day  pantoprazole  Injectable 40 milliGRAM(s) IV Push daily  linezolid  IVPB 600 milliGRAM(s) IV Intermittent every 12 hours  fentaNYL   Infusion 0.101 MICROgram(s)/kG/Hr (1 mL/Hr) IV Continuous <Continuous>  Parenteral Nutrition - Adult 1 Each (55 mL/Hr) TPN Continuous <Continuous>  calcitriol Injectable 1 MICROGram(s) IV Push <User Schedule>  norepinephrine Infusion 0.016 MICROgram(s)/kG/Min (3 mL/Hr) IV Continuous <Continuous>  Parenteral Nutrition - Adult 1 Each (55 mL/Hr) TPN Continuous <Continuous>    MEDICATIONS  (PRN):  ondansetron Injectable 4 milliGRAM(s) IV Push every 6 hours PRN Nausea  sodium chloride 0.9% lock flush 10 milliLiter(s) IV Push every 1 hour PRN After each medication administration  sodium chloride 0.9% lock flush 10 milliLiter(s) IV Push every 12 hours PRN Lumen of catheter NOT used      Drug Dosing Weight  Height (cm): 160 (01 Aug 2017 10:08)  Weight (kg): 99.5 (01 Aug 2017 10:25)  BMI (kg/m2): 38.9 (01 Aug 2017 10:25)  BSA (m2): 2.01 (01 Aug 2017 10:25)      PAST MEDICAL & SURGICAL HISTORY:  Reflux  Obesity  DVT (deep venous thrombosis): 2013 neck  Diverticulitis  Hypertension  Elective surgery: abodominal wall surgery  dec 2016  Elective surgery: NOV 2016 gastric bypass revision  Gastric bypass status for obesity: gastric sleeve, 6/2012  S/P breast biopsy: 2011, left  S/P colon resection: 2011  S/P knee surgery: repair 2009, 2011  right; left  Umbilical hernia: 2000  S/P appendectomy: 1975  S/P tonsillectomy: 1967      ICU Vital Signs Last 24 Hrs  T(C): 37.6 (05 Aug 2017 09:45), Max: 37.6 (04 Aug 2017 20:00)  T(F): 99.6 (05 Aug 2017 09:45), Max: 99.7 (04 Aug 2017 20:00)  HR: 74 (05 Aug 2017 13:00) (68 - 82)  BP: 91/53 (05 Aug 2017 13:00) (82/53 - 106/59)  BP(mean): 66 (05 Aug 2017 13:00) (61 - 79)  ABP: 100/58 (05 Aug 2017 13:00) (84/52 - 130/72)  ABP(mean): 76 (05 Aug 2017 13:00) (66 - 96)  RR: 11 (05 Aug 2017 13:00) (11 - 19)  SpO2: 99% (05 Aug 2017 13:00) (95% - 100%)            04 Aug 2017 07:01  -  05 Aug 2017 07:00  --------------------------------------------------------  IN:    Fat Emulsion 20%: 104 mL    fentaNYL  Infusion: 44 mL    fentaNYL  Infusion: 220 mL    IV PiggyBack: 600 mL    norepinephrine Infusion: 20 mL    norepinephrine Infusion: 20 mL    propofol Infusion: 360 mL    TPN (Total Parenteral Nutrition): 1344 mL  Total IN: 2712 mL    OUT:    Bulb: 70 mL    Bulb: 20 mL    Indwelling Catheter - Urethral: 3900 mL  Total OUT: 3990 mL    Total NET: -1278 mL      05 Aug 2017 07:01  -  05 Aug 2017 13:58  --------------------------------------------------------  IN:    Fat Emulsion 20%: 24 mL    fentaNYL  Infusion: 66 mL    norepinephrine Infusion: 10.5 mL    propofol Infusion: 75 mL    TPN (Total Parenteral Nutrition): 336 mL  Total IN: 511.5 mL    OUT:    Indwelling Catheter - Urethral: 340 mL  Total OUT: 340 mL    Total NET: 171.5 mL          Mode: AC/ CMV (Assist Control/ Continuous Mandatory Ventilation)  RR (machine): 12  TV (machine): 450  FiO2: 40  PEEP: 10  ITime: 1  MAP: 13  PIP: 26      PHYSICAL EXAM:  NEURO: Intubated/sedated, spontaneously MAEx4  HEENT: PERRL, MMM  CV: RRR  PULM: Intubated; decreased breath sounds at lung bases bilaterally  ABD: Softly distended. LLQ CHECO drain with foul-smelling output and surrounding erythema at drain site. RLQ CHECO drain- SS. Midline Prevena vac- minimal, slightly feculent.  : Yu- clear yellow urine.   EXTR: WWP. 2+ pitting edema to thighs   Vasc: 2+ radial, 1+ DP pulses  MSK: no joint swelling  SKIN: Fungal-appearing rash beneath panniculus.           LABS:  CBC Full  -  ( 05 Aug 2017 05:51 )  WBC Count : 10.0 K/uL  Hemoglobin : 8.4 g/dL  Hematocrit : 26.6 %  Platelet Count - Automated : 216 K/uL  Mean Cell Volume : 91.4 fL  Mean Cell Hemoglobin : 28.9 pg  Mean Cell Hemoglobin Concentration : 31.6 g/dL      08-05    143  |  100  |  29<H>  ----------------------------<  115<H>  3.4<L>   |  30  |  1.00    Ca    8.4      05 Aug 2017 05:54  Phos  4.2     08-05  Mg     2.1     08-05    TPro  5.5<L>  /  Alb  1.4<L>  /  TBili  0.6  /  DBili  x   /  AST  12  /  ALT  9<L>  /  AlkPhos  165<H>  08-05

## 2017-08-06 LAB
-  FLUCONAZOLE: SIGNIFICANT CHANGE UP
ALBUMIN SERPL ELPH-MCNC: 1.5 G/DL — LOW (ref 3.3–5)
ALP SERPL-CCNC: 137 U/L — HIGH (ref 40–120)
ALT FLD-CCNC: 7 U/L — LOW (ref 10–45)
ANION GAP SERPL CALC-SCNC: 10 MMOL/L — SIGNIFICANT CHANGE UP (ref 5–17)
APPEARANCE UR: CLEAR — SIGNIFICANT CHANGE UP
AST SERPL-CCNC: 12 U/L — SIGNIFICANT CHANGE UP (ref 10–40)
BILIRUB SERPL-MCNC: 0.5 MG/DL — SIGNIFICANT CHANGE UP (ref 0.2–1.2)
BILIRUB UR-MCNC: NEGATIVE — SIGNIFICANT CHANGE UP
BUN SERPL-MCNC: 28 MG/DL — HIGH (ref 7–23)
CALCIUM SERPL-MCNC: 8.5 MG/DL — SIGNIFICANT CHANGE UP (ref 8.4–10.5)
CHLORIDE SERPL-SCNC: 100 MMOL/L — SIGNIFICANT CHANGE UP (ref 96–108)
CO2 SERPL-SCNC: 28 MMOL/L — SIGNIFICANT CHANGE UP (ref 22–31)
COLOR SPEC: YELLOW — SIGNIFICANT CHANGE UP
CREAT SERPL-MCNC: 0.9 MG/DL — SIGNIFICANT CHANGE UP (ref 0.5–1.3)
CULTURE RESULTS: SIGNIFICANT CHANGE UP
DIFF PNL FLD: NEGATIVE — SIGNIFICANT CHANGE UP
GLUCOSE SERPL-MCNC: 134 MG/DL — HIGH (ref 70–99)
GLUCOSE UR QL: NEGATIVE — SIGNIFICANT CHANGE UP
HCT VFR BLD CALC: 24.8 % — LOW (ref 34.5–45)
HGB BLD-MCNC: 7.9 G/DL — LOW (ref 11.5–15.5)
KETONES UR-MCNC: NEGATIVE — SIGNIFICANT CHANGE UP
LEUKOCYTE ESTERASE UR-ACNC: NEGATIVE — SIGNIFICANT CHANGE UP
MAGNESIUM SERPL-MCNC: 2.2 MG/DL — SIGNIFICANT CHANGE UP (ref 1.6–2.6)
MCHC RBC-ENTMCNC: 28.7 PG — SIGNIFICANT CHANGE UP (ref 27–34)
MCHC RBC-ENTMCNC: 31.9 G/DL — LOW (ref 32–36)
MCV RBC AUTO: 90.2 FL — SIGNIFICANT CHANGE UP (ref 80–100)
METHOD TYPE: SIGNIFICANT CHANGE UP
NITRITE UR-MCNC: NEGATIVE — SIGNIFICANT CHANGE UP
ORGANISM # SPEC MICROSCOPIC CNT: SIGNIFICANT CHANGE UP
ORGANISM # SPEC MICROSCOPIC CNT: SIGNIFICANT CHANGE UP
PH UR: 7.5 — SIGNIFICANT CHANGE UP (ref 5–8)
PHOSPHATE SERPL-MCNC: 3.5 MG/DL — SIGNIFICANT CHANGE UP (ref 2.5–4.5)
PLATELET # BLD AUTO: 242 K/UL — SIGNIFICANT CHANGE UP (ref 150–400)
POTASSIUM SERPL-MCNC: 4.7 MMOL/L — SIGNIFICANT CHANGE UP (ref 3.5–5.3)
POTASSIUM SERPL-SCNC: 4.7 MMOL/L — SIGNIFICANT CHANGE UP (ref 3.5–5.3)
PROT SERPL-MCNC: 5.4 G/DL — LOW (ref 6–8.3)
PROT UR-MCNC: 30 MG/DL
RBC # BLD: 2.75 M/UL — LOW (ref 3.8–5.2)
RBC # FLD: 16.1 % — SIGNIFICANT CHANGE UP (ref 10.3–16.9)
SODIUM SERPL-SCNC: 138 MMOL/L — SIGNIFICANT CHANGE UP (ref 135–145)
SP GR SPEC: 1.01 — SIGNIFICANT CHANGE UP (ref 1–1.03)
SPECIMEN SOURCE: SIGNIFICANT CHANGE UP
UROBILINOGEN FLD QL: 0.2 E.U./DL — SIGNIFICANT CHANGE UP
WBC # BLD: 9 K/UL — SIGNIFICANT CHANGE UP (ref 3.8–10.5)
WBC # FLD AUTO: 9 K/UL — SIGNIFICANT CHANGE UP (ref 3.8–10.5)

## 2017-08-06 PROCEDURE — 71010: CPT | Mod: 26

## 2017-08-06 PROCEDURE — 99291 CRITICAL CARE FIRST HOUR: CPT

## 2017-08-06 RX ORDER — FUROSEMIDE 40 MG
20 TABLET ORAL ONCE
Qty: 0 | Refills: 0 | Status: COMPLETED | OUTPATIENT
Start: 2017-08-06 | End: 2017-08-06

## 2017-08-06 RX ORDER — ELECTROLYTE SOLUTION,INJ
1 VIAL (ML) INTRAVENOUS
Qty: 0 | Refills: 0 | Status: DISCONTINUED | OUTPATIENT
Start: 2017-08-06 | End: 2017-08-06

## 2017-08-06 RX ORDER — ALBUMIN HUMAN 25 %
50 VIAL (ML) INTRAVENOUS EVERY 8 HOURS
Qty: 0 | Refills: 0 | Status: DISCONTINUED | OUTPATIENT
Start: 2017-08-06 | End: 2017-08-08

## 2017-08-06 RX ADMIN — MICAFUNGIN SODIUM 105 MILLIGRAM(S): 100 INJECTION, POWDER, LYOPHILIZED, FOR SOLUTION INTRAVENOUS at 11:32

## 2017-08-06 RX ADMIN — Medication 20 MILLIGRAM(S): at 10:03

## 2017-08-06 RX ADMIN — PROPOFOL 1 MICROGRAM(S)/KG/MIN: 10 INJECTION, EMULSION INTRAVENOUS at 16:11

## 2017-08-06 RX ADMIN — LINEZOLID 300 MILLIGRAM(S): 600 INJECTION, SOLUTION INTRAVENOUS at 05:48

## 2017-08-06 RX ADMIN — LINEZOLID 300 MILLIGRAM(S): 600 INJECTION, SOLUTION INTRAVENOUS at 17:23

## 2017-08-06 RX ADMIN — NYSTATIN CREAM 1 APPLICATION(S): 100000 CREAM TOPICAL at 17:02

## 2017-08-06 RX ADMIN — PIPERACILLIN AND TAZOBACTAM 200 GRAM(S): 4; .5 INJECTION, POWDER, LYOPHILIZED, FOR SOLUTION INTRAVENOUS at 10:28

## 2017-08-06 RX ADMIN — Medication 2: at 11:13

## 2017-08-06 RX ADMIN — PIPERACILLIN AND TAZOBACTAM 200 GRAM(S): 4; .5 INJECTION, POWDER, LYOPHILIZED, FOR SOLUTION INTRAVENOUS at 16:44

## 2017-08-06 RX ADMIN — HEPARIN SODIUM 7500 UNIT(S): 5000 INJECTION INTRAVENOUS; SUBCUTANEOUS at 16:44

## 2017-08-06 RX ADMIN — NYSTATIN CREAM 1 APPLICATION(S): 100000 CREAM TOPICAL at 07:44

## 2017-08-06 RX ADMIN — PIPERACILLIN AND TAZOBACTAM 200 GRAM(S): 4; .5 INJECTION, POWDER, LYOPHILIZED, FOR SOLUTION INTRAVENOUS at 23:19

## 2017-08-06 RX ADMIN — Medication 50 MILLILITER(S): at 21:52

## 2017-08-06 RX ADMIN — HEPARIN SODIUM 7500 UNIT(S): 5000 INJECTION INTRAVENOUS; SUBCUTANEOUS at 01:49

## 2017-08-06 RX ADMIN — FENTANYL CITRATE 1 MICROGRAM(S)/KG/HR: 50 INJECTION INTRAVENOUS at 21:53

## 2017-08-06 RX ADMIN — PROPOFOL 1 MICROGRAM(S)/KG/MIN: 10 INJECTION, EMULSION INTRAVENOUS at 21:52

## 2017-08-06 RX ADMIN — PANTOPRAZOLE SODIUM 40 MILLIGRAM(S): 20 TABLET, DELAYED RELEASE ORAL at 11:13

## 2017-08-06 RX ADMIN — Medication 1 EACH: at 17:01

## 2017-08-06 RX ADMIN — HEPARIN SODIUM 7500 UNIT(S): 5000 INJECTION INTRAVENOUS; SUBCUTANEOUS at 23:19

## 2017-08-06 RX ADMIN — Medication 50 MILLILITER(S): at 14:37

## 2017-08-06 RX ADMIN — PIPERACILLIN AND TAZOBACTAM 200 GRAM(S): 4; .5 INJECTION, POWDER, LYOPHILIZED, FOR SOLUTION INTRAVENOUS at 05:48

## 2017-08-06 RX ADMIN — HEPARIN SODIUM 7500 UNIT(S): 5000 INJECTION INTRAVENOUS; SUBCUTANEOUS at 08:52

## 2017-08-06 NOTE — PROGRESS NOTE ADULT - ASSESSMENT
57 F s/p  SBR resection, fistula resection, esophagojejunostomy revision w/ post-op course complicated by RTOR for distal anastomosis breakdown, ATN, acute respiratory failure, & septic shock.     Neuro: Propofol gtt, Fentanyl gtt  CV: MAP 65, will give lasix 20 x 1  Resp: AC//40/12/10  GI/FEN: NPO, TPN/lipids, Vit B12, Vit D, Protonix  : Yu  Endo: ISS  ID: anastamotic leak: zosyn (8/1--), micafungin (7/29-),  linezolid (7/25-8/1; 8/2-)  ///dc: Perioperative Gent/Vanc, colistin (7/29-8/1), vanc (8/1-8/2),  Heme/PPX: SCDs, SQH  Lines: Radha (7/24-), L basilic vein PICC (7/25-), L IJ TLC (7/30-)  Drains: RLQ mediastinal CHECO to bulb suction, LLQ CHECO to water seal. WTD dressing to midline wound.  Wounds: Midline xiphoid to pubis incision w/ retention sutures.

## 2017-08-06 NOTE — PROGRESS NOTE ADULT - SUBJECTIVE AND OBJECTIVE BOX
INTERVAL HPI/OVERNIGHT EVENTS: O/N: Dressing changed multiple times, feculent drainage through incision. off levo  : Radiology confirmed anastomotic leak.         MEDICATIONS  (STANDING):  insulin lispro (HumaLOG) corrective regimen sliding scale   SubCutaneous Before meals and at bedtime  dextrose 5%. 1000 milliLiter(s) (50 mL/Hr) IV Continuous <Continuous>  dextrose 50% Injectable 25 Gram(s) IV Push once  sodium chloride 0.9% lock flush 20 milliLiter(s) IV Push once  cyanocobalamin Injectable 1000 MICROGram(s) SubCutaneous every 7 days  dextrose 50% Injectable 12.5 Gram(s) IV Push once  heparin  Injectable 7500 Unit(s) SubCutaneous every 8 hours  fat emulsion 20% Infusion 50 Gram(s) (20.8 mL/Hr) IV Continuous <Continuous>  micafungin IVPB 100 milliGRAM(s) IV Intermittent every 24 hours  micafungin IVPB   IV Intermittent   propofol Infusion 1.662 MICROgram(s)/kG/Min (1 mL/Hr) IV Continuous <Continuous>  piperacillin/tazobactam IVPB. 3.375 Gram(s) IV Intermittent every 6 hours  nystatin Powder 1 Application(s) Topical two times a day  pantoprazole  Injectable 40 milliGRAM(s) IV Push daily  linezolid  IVPB 600 milliGRAM(s) IV Intermittent every 12 hours  fentaNYL   Infusion 0.101 MICROgram(s)/kG/Hr (1 mL/Hr) IV Continuous <Continuous>  calcitriol Injectable 1 MICROGram(s) IV Push <User Schedule>  Parenteral Nutrition - Adult 1 Each (55 mL/Hr) TPN Continuous <Continuous>  Parenteral Nutrition - Adult 1 Each (55 mL/Hr) TPN Continuous <Continuous>  furosemide   Injectable 20 milliGRAM(s) IV Push once    MEDICATIONS  (PRN):  ondansetron Injectable 4 milliGRAM(s) IV Push every 6 hours PRN Nausea  sodium chloride 0.9% lock flush 10 milliLiter(s) IV Push every 1 hour PRN After each medication administration  sodium chloride 0.9% lock flush 10 milliLiter(s) IV Push every 12 hours PRN Lumen of catheter NOT used      Drug Dosing Weight  Height (cm): 160 (01 Aug 2017 10:08)  Weight (kg): 99.5 (01 Aug 2017 10:25)  BMI (kg/m2): 38.9 (01 Aug 2017 10:25)  BSA (m2): 2.01 (01 Aug 2017 10:25)      PAST MEDICAL & SURGICAL HISTORY:  Reflux  Obesity  DVT (deep venous thrombosis):  neck  Diverticulitis  Hypertension  Elective surgery: abodominal wall surgery  dec 2016  Elective surgery: 2016 gastric bypass revision  Gastric bypass status for obesity: gastric sleeve, 2012  S/P breast biopsy: , left  S/P colon resection:   S/P knee surgery: repair ,   right; left  Umbilical hernia:   S/P appendectomy:   S/P tonsillectomy: 1967      ICU Vital Signs Last 24 Hrs  T(C): 37.6 (06 Aug 2017 09:00), Max: 37.8 (05 Aug 2017 17:59)  T(F): 99.6 (06 Aug 2017 09:00), Max: 100.1 (05 Aug 2017 17:59)  HR: 78 (06 Aug 2017 10:00) (68 - 90)  BP: 115/72 (06 Aug 2017 01:00) (90/53 - 115/72)  BP(mean): 88 (06 Aug 2017 01:00) (64 - 88)  ABP: 102/62 (06 Aug 2017 10:00) (96/52 - 144/86)  ABP(mean): 78 (06 Aug 2017 10:00) (70 - 110)  RR: 12 (06 Aug 2017 10:00) (11 - 20)  SpO2: 99% (06 Aug 2017 10:00) (92% - 100%)            05 Aug 2017 07:  -  06 Aug 2017 07:00  --------------------------------------------------------  IN:    Fat Emulsion 20%: 24 mL    fentaNYL  Infusion: 264 mL    IV PiggyBack: 1200 mL    norepinephrine Infusion: 12.5 mL    propofol Infusion: 279 mL    TPN (Total Parenteral Nutrition): 1331 mL  Total IN: 3110.5 mL    OUT:    Bulb: 10 mL    Bulb: 20 mL    Indwelling Catheter - Urethral: 1510 mL  Total OUT: 1540 mL    Total NET: 1570.5 mL      06 Aug 2017 07:01  -  06 Aug 2017 10:00  --------------------------------------------------------  IN:    fentaNYL  Infusion: 33 mL    propofol Infusion: 36 mL    TPN (Total Parenteral Nutrition): 165 mL  Total IN: 234 mL    OUT:    Indwelling Catheter - Urethral: 230 mL  Total OUT: 230 mL    Total NET: 4 mL          Mode: AC/ CMV (Assist Control/ Continuous Mandatory Ventilation)  RR (machine): 12  TV (machine): 450  FiO2: 40  PEEP: 10  ITime: 1  MAP: 13  PIP: 24      PHYSICAL EXAM:  NEURO: Intubated/sedated, spontaneously MAEx4, opens eyes to voice  HEENT: PERRL, MMM  CV: RRR  PULM: Intubated; decreased breath sounds at lung bases bilaterally, L>R  ABD: Softly distended. LLQ CHECO drain with foul-smelling output and surrounding erythema at drain site. RLQ CHECO drain- SS. Midline wound vac- minimal, slightly feculent.  : Yu- clear yellow urine.   EXTR: WWP. 2+ pitting edema to thighs, +b/l UE edema  Vasc: 2+ radial, 1+ DP pulses  MSK: no joint swelling  SKIN: Fungal-appearing rash beneath panniculus.        LABS:  CBC Full  -  ( 06 Aug 2017 06:04 )  WBC Count : 9.0 K/uL  Hemoglobin : 7.9 g/dL  Hematocrit : 24.8 %  Platelet Count - Automated : 242 K/uL  Mean Cell Volume : 90.2 fL  Mean Cell Hemoglobin : 28.7 pg  Mean Cell Hemoglobin Concentration : 31.9 g/dL      08    138  |  100  |  28<H>  ----------------------------<  134<H>  4.7   |  28  |  0.90    Ca    8.5      06 Aug 2017 06:04  Phos  3.5     08-  Mg     2.2     08-06    TPro  5.4<L>  /  Alb  1.5<L>  /  TBili  0.5  /  DBili  x   /  AST  12  /  ALT  7<L>  /  AlkPhos  137<H>  08-06      Urinalysis Basic - ( 06 Aug 2017 02:08 )    Color: Yellow / Appearance: Clear / S.010 / pH: x  Gluc: x / Ketone: NEGATIVE  / Bili: NEGATIVE / Urobili: 0.2 E.U./dL   Blood: x / Protein: 30 mg/dL / Nitrite: NEGATIVE   Leuk Esterase: NEGATIVE / RBC: < 5 /HPF / WBC < 5 /HPF   Sq Epi: x / Non Sq Epi: Few /HPF / Bacteria: Present /HPF

## 2017-08-07 ENCOUNTER — OTHER (OUTPATIENT)
Age: 57
End: 2017-08-07

## 2017-08-07 ENCOUNTER — RESULT REVIEW (OUTPATIENT)
Age: 57
End: 2017-08-07

## 2017-08-07 LAB
ANION GAP SERPL CALC-SCNC: 10 MMOL/L — SIGNIFICANT CHANGE UP (ref 5–17)
ANION GAP SERPL CALC-SCNC: 12 MMOL/L — SIGNIFICANT CHANGE UP (ref 5–17)
BASE EXCESS BLDA CALC-SCNC: 3.1 MMOL/L — HIGH (ref -2–3)
BLD GP AB SCN SERPL QL: NEGATIVE — SIGNIFICANT CHANGE UP
BUN SERPL-MCNC: 25 MG/DL — HIGH (ref 7–23)
BUN SERPL-MCNC: 28 MG/DL — HIGH (ref 7–23)
CA-I BLDA-SCNC: 1.2 MMOL/L — SIGNIFICANT CHANGE UP (ref 1.12–1.3)
CALCIUM SERPL-MCNC: 8.8 MG/DL — SIGNIFICANT CHANGE UP (ref 8.4–10.5)
CALCIUM SERPL-MCNC: 8.9 MG/DL — SIGNIFICANT CHANGE UP (ref 8.4–10.5)
CHLORIDE SERPL-SCNC: 100 MMOL/L — SIGNIFICANT CHANGE UP (ref 96–108)
CHLORIDE SERPL-SCNC: 99 MMOL/L — SIGNIFICANT CHANGE UP (ref 96–108)
CO2 SERPL-SCNC: 26 MMOL/L — SIGNIFICANT CHANGE UP (ref 22–31)
CO2 SERPL-SCNC: 28 MMOL/L — SIGNIFICANT CHANGE UP (ref 22–31)
COHGB MFR BLDA: 0.9 % — SIGNIFICANT CHANGE UP
CREAT SERPL-MCNC: 0.8 MG/DL — SIGNIFICANT CHANGE UP (ref 0.5–1.3)
CREAT SERPL-MCNC: 0.9 MG/DL — SIGNIFICANT CHANGE UP (ref 0.5–1.3)
GAS PNL BLDA: SIGNIFICANT CHANGE UP
GLUCOSE SERPL-MCNC: 127 MG/DL — HIGH (ref 70–99)
GLUCOSE SERPL-MCNC: 194 MG/DL — HIGH (ref 70–99)
HCO3 BLDA-SCNC: 27 MMOL/L — SIGNIFICANT CHANGE UP (ref 21–28)
HCT VFR BLD CALC: 23.5 % — LOW (ref 34.5–45)
HGB BLD-MCNC: 7.4 G/DL — LOW (ref 11.5–15.5)
HGB BLDA-MCNC: 7.5 G/DL — LOW (ref 11.5–15.5)
MAGNESIUM SERPL-MCNC: 2.1 MG/DL — SIGNIFICANT CHANGE UP (ref 1.6–2.6)
MAGNESIUM SERPL-MCNC: 2.2 MG/DL — SIGNIFICANT CHANGE UP (ref 1.6–2.6)
MCHC RBC-ENTMCNC: 28.8 PG — SIGNIFICANT CHANGE UP (ref 27–34)
MCHC RBC-ENTMCNC: 31.5 G/DL — LOW (ref 32–36)
MCV RBC AUTO: 91.4 FL — SIGNIFICANT CHANGE UP (ref 80–100)
METHGB MFR BLDA: 0.6 % — SIGNIFICANT CHANGE UP
O2 CT VFR BLDA CALC: 11.1 ML/DL — SIGNIFICANT CHANGE UP (ref 15–23)
OXYHGB MFR BLDA: 98 % — SIGNIFICANT CHANGE UP (ref 94–100)
PCO2 BLDA: 38 MMHG — SIGNIFICANT CHANGE UP (ref 32–45)
PH BLDA: 7.47 — HIGH (ref 7.35–7.45)
PHOSPHATE SERPL-MCNC: 3.4 MG/DL — SIGNIFICANT CHANGE UP (ref 2.5–4.5)
PHOSPHATE SERPL-MCNC: 3.5 MG/DL — SIGNIFICANT CHANGE UP (ref 2.5–4.5)
PLATELET # BLD AUTO: 251 K/UL — SIGNIFICANT CHANGE UP (ref 150–400)
PO2 BLDA: 265 MMHG — HIGH (ref 83–108)
POTASSIUM BLDA-SCNC: 4.5 MMOL/L — SIGNIFICANT CHANGE UP (ref 3.5–4.9)
POTASSIUM SERPL-MCNC: 4.6 MMOL/L — SIGNIFICANT CHANGE UP (ref 3.5–5.3)
POTASSIUM SERPL-MCNC: 4.8 MMOL/L — SIGNIFICANT CHANGE UP (ref 3.5–5.3)
POTASSIUM SERPL-SCNC: 4.6 MMOL/L — SIGNIFICANT CHANGE UP (ref 3.5–5.3)
POTASSIUM SERPL-SCNC: 4.8 MMOL/L — SIGNIFICANT CHANGE UP (ref 3.5–5.3)
PREALB SERPL-MCNC: 11 MG/DL — LOW (ref 20–40)
RBC # BLD: 2.57 M/UL — LOW (ref 3.8–5.2)
RBC # FLD: 16.2 % — SIGNIFICANT CHANGE UP (ref 10.3–16.9)
RH IG SCN BLD-IMP: POSITIVE — SIGNIFICANT CHANGE UP
SAO2 % BLDA: 100 % — SIGNIFICANT CHANGE UP (ref 95–100)
SODIUM BLDA-SCNC: 139 MMOL/L — SIGNIFICANT CHANGE UP (ref 138–146)
SODIUM SERPL-SCNC: 137 MMOL/L — SIGNIFICANT CHANGE UP (ref 135–145)
SODIUM SERPL-SCNC: 138 MMOL/L — SIGNIFICANT CHANGE UP (ref 135–145)
WBC # BLD: 8 K/UL — SIGNIFICANT CHANGE UP (ref 3.8–10.5)
WBC # FLD AUTO: 8 K/UL — SIGNIFICANT CHANGE UP (ref 3.8–10.5)

## 2017-08-07 PROCEDURE — 11008 RMV PRSTC MTRL/MESH ABD WALL: CPT | Mod: 58

## 2017-08-07 PROCEDURE — 11005 DBRDMT SKIN ABDOMINAL WALL: CPT | Mod: 59,58

## 2017-08-07 PROCEDURE — 49020 DRAINAGE ABDOM ABSCESS OPEN: CPT | Mod: 58

## 2017-08-07 PROCEDURE — 97606 NEG PRS WND THER DME>50 SQCM: CPT

## 2017-08-07 PROCEDURE — 99291 CRITICAL CARE FIRST HOUR: CPT

## 2017-08-07 PROCEDURE — 71010: CPT | Mod: 26

## 2017-08-07 PROCEDURE — 49002 REOPENING OF ABDOMEN: CPT | Mod: 59

## 2017-08-07 RX ORDER — ELECTROLYTE SOLUTION,INJ
1 VIAL (ML) INTRAVENOUS
Qty: 0 | Refills: 0 | Status: DISCONTINUED | OUTPATIENT
Start: 2017-08-07 | End: 2017-08-07

## 2017-08-07 RX ORDER — I.V. FAT EMULSION 20 G/100ML
20 EMULSION INTRAVENOUS
Qty: 0 | Refills: 0 | Status: DISCONTINUED | OUTPATIENT
Start: 2017-08-07 | End: 2017-08-07

## 2017-08-07 RX ORDER — FLUCONAZOLE 150 MG/1
400 TABLET ORAL EVERY 24 HOURS
Qty: 0 | Refills: 0 | Status: DISCONTINUED | OUTPATIENT
Start: 2017-08-08 | End: 2017-08-12

## 2017-08-07 RX ORDER — ACETAMINOPHEN 500 MG
1000 TABLET ORAL ONCE
Qty: 0 | Refills: 0 | Status: COMPLETED | OUTPATIENT
Start: 2017-08-07 | End: 2017-08-07

## 2017-08-07 RX ADMIN — LINEZOLID 300 MILLIGRAM(S): 600 INJECTION, SOLUTION INTRAVENOUS at 06:36

## 2017-08-07 RX ADMIN — HEPARIN SODIUM 7500 UNIT(S): 5000 INJECTION INTRAVENOUS; SUBCUTANEOUS at 15:09

## 2017-08-07 RX ADMIN — PROPOFOL 1 MICROGRAM(S)/KG/MIN: 10 INJECTION, EMULSION INTRAVENOUS at 22:55

## 2017-08-07 RX ADMIN — LINEZOLID 300 MILLIGRAM(S): 600 INJECTION, SOLUTION INTRAVENOUS at 17:41

## 2017-08-07 RX ADMIN — Medication 400 MILLIGRAM(S): at 17:39

## 2017-08-07 RX ADMIN — I.V. FAT EMULSION 8.33 GRAM(S): 20 EMULSION INTRAVENOUS at 21:33

## 2017-08-07 RX ADMIN — PIPERACILLIN AND TAZOBACTAM 200 GRAM(S): 4; .5 INJECTION, POWDER, LYOPHILIZED, FOR SOLUTION INTRAVENOUS at 09:40

## 2017-08-07 RX ADMIN — PIPERACILLIN AND TAZOBACTAM 200 GRAM(S): 4; .5 INJECTION, POWDER, LYOPHILIZED, FOR SOLUTION INTRAVENOUS at 06:34

## 2017-08-07 RX ADMIN — Medication 50 MILLILITER(S): at 16:47

## 2017-08-07 RX ADMIN — Medication 2: at 22:54

## 2017-08-07 RX ADMIN — Medication 50 MILLILITER(S): at 06:35

## 2017-08-07 RX ADMIN — Medication 1 EACH: at 17:40

## 2017-08-07 RX ADMIN — PANTOPRAZOLE SODIUM 40 MILLIGRAM(S): 20 TABLET, DELAYED RELEASE ORAL at 11:40

## 2017-08-07 RX ADMIN — PIPERACILLIN AND TAZOBACTAM 200 GRAM(S): 4; .5 INJECTION, POWDER, LYOPHILIZED, FOR SOLUTION INTRAVENOUS at 17:40

## 2017-08-07 RX ADMIN — HEPARIN SODIUM 7500 UNIT(S): 5000 INJECTION INTRAVENOUS; SUBCUTANEOUS at 07:07

## 2017-08-07 RX ADMIN — PROPOFOL 1 MICROGRAM(S)/KG/MIN: 10 INJECTION, EMULSION INTRAVENOUS at 06:33

## 2017-08-07 RX ADMIN — MICAFUNGIN SODIUM 105 MILLIGRAM(S): 100 INJECTION, POWDER, LYOPHILIZED, FOR SOLUTION INTRAVENOUS at 11:40

## 2017-08-07 RX ADMIN — NYSTATIN CREAM 1 APPLICATION(S): 100000 CREAM TOPICAL at 06:36

## 2017-08-07 RX ADMIN — PIPERACILLIN AND TAZOBACTAM 200 GRAM(S): 4; .5 INJECTION, POWDER, LYOPHILIZED, FOR SOLUTION INTRAVENOUS at 22:54

## 2017-08-07 RX ADMIN — CALCITRIOL 1 MICROGRAM(S): 0.5 CAPSULE ORAL at 11:40

## 2017-08-07 RX ADMIN — Medication 50 MILLILITER(S): at 21:33

## 2017-08-07 RX ADMIN — NYSTATIN CREAM 1 APPLICATION(S): 100000 CREAM TOPICAL at 21:33

## 2017-08-07 RX ADMIN — HEPARIN SODIUM 7500 UNIT(S): 5000 INJECTION INTRAVENOUS; SUBCUTANEOUS at 22:54

## 2017-08-07 NOTE — PROGRESS NOTE ADULT - SUBJECTIVE AND OBJECTIVE BOX
INTERVAL HPI/OVERNIGHT EVENTS:    VITAL SIGNS:  T(F): 97.1 (17 @ 22:05)  HR: 66 (17 @ 01:00)  BP: 129/83 (17 @ 10:51)  RR: 11 (17 @ 01:00)  SpO2: 100% (17 @ 01:00)  Wt(kg): --    PHYSICAL EXAM:    Constitutional: sedated  Eyes: conjunctiva and sclera clear  Head: Normocephalic; atraumatic  ENMT: intubated  Neck: Supple; non tender; no masses  Respiratory: diminished breath sounds b/l  Cardiovascular: Regular rate and rhythm. S1 and S2 Normal; No murmurs, gallops or rubs  Gastrointestinal: Soft non-tender non-distended; Normal bowel sounds; No hepatosplenomegaly  Extremities: No clubbing, cyanosis   Vascular: Peripheral pulses palpable 2+ bilaterally  Neurological: sedated  Skin: Warm and dry. No acute rash  Lymph Nodes: No acute cervical adenopathy    MEDICATIONS  (STANDING):  insulin lispro (HumaLOG) corrective regimen sliding scale   SubCutaneous Before meals and at bedtime  dextrose 5%. 1000 milliLiter(s) (50 mL/Hr) IV Continuous <Continuous>  dextrose 50% Injectable 25 Gram(s) IV Push once  sodium chloride 0.9% lock flush 20 milliLiter(s) IV Push once  cyanocobalamin Injectable 1000 MICROGram(s) SubCutaneous every 7 days  dextrose 50% Injectable 12.5 Gram(s) IV Push once  heparin  Injectable 7500 Unit(s) SubCutaneous every 8 hours  fat emulsion 20% Infusion 50 Gram(s) (20.8 mL/Hr) IV Continuous <Continuous>  propofol Infusion 1.662 MICROgram(s)/kG/Min (1 mL/Hr) IV Continuous <Continuous>  piperacillin/tazobactam IVPB. 3.375 Gram(s) IV Intermittent every 6 hours  nystatin Powder 1 Application(s) Topical two times a day  pantoprazole  Injectable 40 milliGRAM(s) IV Push daily  linezolid  IVPB 600 milliGRAM(s) IV Intermittent every 12 hours  fentaNYL   Infusion 0.101 MICROgram(s)/kG/Hr (1 mL/Hr) IV Continuous <Continuous>  calcitriol Injectable 1 MICROGram(s) IV Push <User Schedule>  albumin human 25% IVPB 50 milliLiter(s) IV Intermittent every 8 hours  fat emulsion 20% Infusion 20 Gram(s) (8.33 mL/Hr) IV Continuous <Continuous>  Parenteral Nutrition - Adult 1 Each (69 mL/Hr) TPN Continuous <Continuous>  fluconAZOLE IVPB 400 milliGRAM(s) IV Intermittent every 24 hours    MEDICATIONS  (PRN):  ondansetron Injectable 4 milliGRAM(s) IV Push every 6 hours PRN Nausea  sodium chloride 0.9% lock flush 10 milliLiter(s) IV Push every 1 hour PRN After each medication administration  sodium chloride 0.9% lock flush 10 milliLiter(s) IV Push every 12 hours PRN Lumen of catheter NOT used      Allergies    sulfa drugs (Unknown)  sulfamethoxazole (Other)    Intolerances        LABS:                        7.4    8.0   )-----------( 251      ( 07 Aug 2017 05:12 )             23.5     08-07    138  |  100  |  25<H>  ----------------------------<  194<H>  4.6   |  26  |  0.80    Ca    8.8      07 Aug 2017 20:54  Phos  3.5     -  Mg     2.1     -    TPro  5.4<L>  /  Alb  1.5<L>  /  TBili  0.5  /  DBili  x   /  AST  12  /  ALT  7<L>  /  AlkPhos  137<H>  08-      Urinalysis Basic - ( 06 Aug 2017 02:08 )    Color: Yellow / Appearance: Clear / S.010 / pH: x  Gluc: x / Ketone: NEGATIVE  / Bili: NEGATIVE / Urobili: 0.2 E.U./dL   Blood: x / Protein: 30 mg/dL / Nitrite: NEGATIVE   Leuk Esterase: NEGATIVE / RBC: < 5 /HPF / WBC < 5 /HPF   Sq Epi: x / Non Sq Epi: Few /HPF / Bacteria: Present /HPF        RADIOLOGY & ADDITIONAL TESTS:    Culture - Body Fluid with Gram Stain (17 @ 12:19)    -  Gentamicin: S <=4    -  Gentamicin: S <=4    -  Piperacillin/Tazobactam: S <=16    -  Piperacillin/Tazobactam: S <=16    -  Ampicillin: S <=8    -  Ceftriaxone: S <=1    -  Ceftriaxone: S <=1    -  Trimethoprim/Sulfamethoxazole: S <=2/38    -  Trimethoprim/Sulfamethoxazole: S <=2/38    Gram Stain:   Moderate Gram Negative Rods  Numerous White blood cells    -  Ampicillin: R >16    -  Ampicillin/Sulbactam: S <=8/4    -  Ampicillin/Sulbactam: S <=8/4    -  Cefazolin: S <=8    -  Cefazolin: S <=8    -  Ciprofloxacin: S <=1    -  Ciprofloxacin: S <=1    -  Tobramycin: S <=4    -  Tobramycin: S <=4    Specimen Source: .Body Fluid LEFT lower CHECO    Culture Results:   Numerous Escherichia coli  Few Klebsiella oxytoca    Organism Identification: Escherichia coli  Klebsiella oxytoca    Organism: Escherichia coli    Organism: Klebsiella oxytoca    Method Type: ALISHA    Method Type: ALISHA    Fungus Identification (17 @ 23:38)    Culture Results:   Candida albicans  FLUCONAZOLE= Data established for mucosal disease, and  is generally applicable for invasive disease.  Susceptibility is dependent on achieving  the maximal possible blood level.  Doses of 400 MG/day or more may be required in adults with  normal renal function and body habitus.    Culture - Body Fluid with Gram Stain (17 @ 15:25)    Gram Stain:   No organisms seen  No White blood cells    Specimen Source: .Body Fluid Peritoneal Fluid    Culture Results:   No growth    Culture - Blood (17 @ 11:29)    Specimen Source: .Blood Blood-Peripheral    Culture Results:   No growth at 5 days.    Culture - Blood (17 @ 11:29)    Specimen Source: .Blood Blood-Peripheral    Culture Results:   No growth at 5 days.    Culture - Body Fluid with Gram Stain (17 @ 21:10)    -  Cefazolin: S <=8    -  Ciprofloxacin: R >2    -  Erythromycin: I 4    -  Linezolid: S 2    -  Ampicillin: R >8    -  Ampicillin/Sulbactam: S <=8/4    -  Ciprofloxacin: S <=1    -  Penicillin: R >8    -  Tetra/Doxy: S <=4    -  Ampicillin: S <=8    -  Gentamicin: S <=4    -  Piperacillin/Tazobactam: S <=16    -  Tobramycin: S <=4    -  Ceftriaxone: S <=1    -  Trimethoprim/Sulfamethoxazole: S <=/38    Gram Stain:   Numerous Gram Negative Rods  Few White blood cells    Specimen Source: .Body Fluid abdominal fluid    Culture Results:   Numerous Escherichia coli  Rare Yeast ...... Choclate plate send to core lab for ID and sensitivity  see#[id number]  Rare Lactobacillus species  Rare Enterococcus faecium  More than three types of organisms recovered may indicate colonization  and/or contamination.  Please call Microbiology immediately if identification and/or  suceptibility is indicated.    Organism Identification: Escherichia coli  Enterococcus faecium    Organism: Escherichia coli    Organism: Enterococcus faecium    Method Type: ALISHA    Method Type: ALISHA    Culture - Blood (17 @ 23:16)    Specimen Source: .Blood Blood-Peripheral    Culture Results:   No growth at 5 days.    Culture - Blood (17 @ 23:16)    Specimen Source: .Blood Blood-Peripheral    Culture Results:   No growth at 5 days. INTERVAL HPI/OVERNIGHT EVENTS: Afebrile, recent ab exploration today revealing feculent matter, succus, and pus.     VITAL SIGNS:  T(F): 97.1 (17 @ 22:05)  HR: 66 (17 @ 01:00)  BP: 129/83 (17 @ 10:51)  RR: 11 (17 @ 01:00)  SpO2: 100% (17 @ 01:00)  Wt(kg): --    PHYSICAL EXAM:    Constitutional: sedated  Eyes: conjunctiva and sclera clear  Head: Normocephalic; atraumatic  ENMT: intubated  Neck: Supple; non tender; no masses  Respiratory: diminished breath sounds b/l  Cardiovascular: Regular rate and rhythm. S1 and S2 Normal; No murmurs, gallops or rubs  Gastrointestinal: Soft non-tender non-distended; Normal bowel sounds; No hepatosplenomegaly  Extremities: No clubbing, cyanosis   Vascular: Peripheral pulses palpable 2+ bilaterally  Neurological: sedated  Skin: Warm and dry. No acute rash  Lymph Nodes: No acute cervical adenopathy    MEDICATIONS  (STANDING):  insulin lispro (HumaLOG) corrective regimen sliding scale   SubCutaneous Before meals and at bedtime  dextrose 5%. 1000 milliLiter(s) (50 mL/Hr) IV Continuous <Continuous>  dextrose 50% Injectable 25 Gram(s) IV Push once  sodium chloride 0.9% lock flush 20 milliLiter(s) IV Push once  cyanocobalamin Injectable 1000 MICROGram(s) SubCutaneous every 7 days  dextrose 50% Injectable 12.5 Gram(s) IV Push once  heparin  Injectable 7500 Unit(s) SubCutaneous every 8 hours  fat emulsion 20% Infusion 50 Gram(s) (20.8 mL/Hr) IV Continuous <Continuous>  propofol Infusion 1.662 MICROgram(s)/kG/Min (1 mL/Hr) IV Continuous <Continuous>  piperacillin/tazobactam IVPB. 3.375 Gram(s) IV Intermittent every 6 hours  nystatin Powder 1 Application(s) Topical two times a day  pantoprazole  Injectable 40 milliGRAM(s) IV Push daily  linezolid  IVPB 600 milliGRAM(s) IV Intermittent every 12 hours  fentaNYL   Infusion 0.101 MICROgram(s)/kG/Hr (1 mL/Hr) IV Continuous <Continuous>  calcitriol Injectable 1 MICROGram(s) IV Push <User Schedule>  albumin human 25% IVPB 50 milliLiter(s) IV Intermittent every 8 hours  fat emulsion 20% Infusion 20 Gram(s) (8.33 mL/Hr) IV Continuous <Continuous>  Parenteral Nutrition - Adult 1 Each (69 mL/Hr) TPN Continuous <Continuous>  fluconAZOLE IVPB 400 milliGRAM(s) IV Intermittent every 24 hours    MEDICATIONS  (PRN):  ondansetron Injectable 4 milliGRAM(s) IV Push every 6 hours PRN Nausea  sodium chloride 0.9% lock flush 10 milliLiter(s) IV Push every 1 hour PRN After each medication administration  sodium chloride 0.9% lock flush 10 milliLiter(s) IV Push every 12 hours PRN Lumen of catheter NOT used      Allergies    sulfa drugs (Unknown)  sulfamethoxazole (Other)    Intolerances        LABS:                        7.4    8.0   )-----------( 251      ( 07 Aug 2017 05:12 )             23.5     08-07    138  |  100  |  25<H>  ----------------------------<  194<H>  4.6   |  26  |  0.80    Ca    8.8      07 Aug 2017 20:54  Phos  3.5     08-  Mg     2.1     08-    TPro  5.4<L>  /  Alb  1.5<L>  /  TBili  0.5  /  DBili  x   /  AST  12  /  ALT  7<L>  /  AlkPhos  137<H>  08-06      Urinalysis Basic - ( 06 Aug 2017 02:08 )    Color: Yellow / Appearance: Clear / S.010 / pH: x  Gluc: x / Ketone: NEGATIVE  / Bili: NEGATIVE / Urobili: 0.2 E.U./dL   Blood: x / Protein: 30 mg/dL / Nitrite: NEGATIVE   Leuk Esterase: NEGATIVE / RBC: < 5 /HPF / WBC < 5 /HPF   Sq Epi: x / Non Sq Epi: Few /HPF / Bacteria: Present /HPF        RADIOLOGY & ADDITIONAL TESTS:    Culture - Body Fluid with Gram Stain (17 @ 12:19)    -  Gentamicin: S <=4    -  Gentamicin: S <=4    -  Piperacillin/Tazobactam: S <=16    -  Piperacillin/Tazobactam: S <=16    -  Ampicillin: S <=8    -  Ceftriaxone: S <=1    -  Ceftriaxone: S <=1    -  Trimethoprim/Sulfamethoxazole: S <=2/38    -  Trimethoprim/Sulfamethoxazole: S <=2/38    Gram Stain:   Moderate Gram Negative Rods  Numerous White blood cells    -  Ampicillin: R >16    -  Ampicillin/Sulbactam: S <=8/4    -  Ampicillin/Sulbactam: S <=8/4    -  Cefazolin: S <=8    -  Cefazolin: S <=8    -  Ciprofloxacin: S <=1    -  Ciprofloxacin: S <=1    -  Tobramycin: S <=4    -  Tobramycin: S <=4    Specimen Source: .Body Fluid LEFT lower CHECO    Culture Results:   Numerous Escherichia coli  Few Klebsiella oxytoca    Organism Identification: Escherichia coli  Klebsiella oxytoca    Organism: Escherichia coli    Organism: Klebsiella oxytoca    Method Type: ALISHA    Method Type: ALISHA    Fungus Identification (17 @ 23:38)    Culture Results:   Candida albicans  FLUCONAZOLE= Data established for mucosal disease, and  is generally applicable for invasive disease.  Susceptibility is dependent on achieving  the maximal possible blood level.  Doses of 400 MG/day or more may be required in adults with  normal renal function and body habitus.    Culture - Body Fluid with Gram Stain (17 @ 15:25)    Gram Stain:   No organisms seen  No White blood cells    Specimen Source: .Body Fluid Peritoneal Fluid    Culture Results:   No growth    Culture - Blood (17 @ 11:29)    Specimen Source: .Blood Blood-Peripheral    Culture Results:   No growth at 5 days.    Culture - Blood (17 @ 11:29)    Specimen Source: .Blood Blood-Peripheral    Culture Results:   No growth at 5 days.    Culture - Body Fluid with Gram Stain (17 @ 21:10)    -  Cefazolin: S <=8    -  Ciprofloxacin: R >2    -  Erythromycin: I 4    -  Linezolid: S 2    -  Ampicillin: R >8    -  Ampicillin/Sulbactam: S <=8/4    -  Ciprofloxacin: S <=1    -  Penicillin: R >8    -  Tetra/Doxy: S <=4    -  Ampicillin: S <=8    -  Gentamicin: S <=4    -  Piperacillin/Tazobactam: S <=16    -  Tobramycin: S <=4    -  Ceftriaxone: S <=1    -  Trimethoprim/Sulfamethoxazole: S <=2/38    Gram Stain:   Numerous Gram Negative Rods  Few White blood cells    Specimen Source: .Body Fluid abdominal fluid    Culture Results:   Numerous Escherichia coli  Rare Yeast ...... Choclate plate send to core lab for ID and sensitivity  see#[id number]  Rare Lactobacillus species  Rare Enterococcus faecium  More than three types of organisms recovered may indicate colonization  and/or contamination.  Please call Microbiology immediately if identification and/or  suceptibility is indicated.    Organism Identification: Escherichia coli  Enterococcus faecium    Organism: Escherichia coli    Organism: Enterococcus faecium    Method Type: ALISHA    Method Type: ALISHA    Culture - Blood (17 @ 23:16)    Specimen Source: .Blood Blood-Peripheral    Culture Results:   No growth at 5 days.    Culture - Blood (17 @ 23:16)    Specimen Source: .Blood Blood-Peripheral    Culture Results:   No growth at 5 days. Critical Care      INTERVAL HPI/OVERNIGHT EVENTS: Afebrile, recent ab exploration today revealing feculent matter, succus, and pus.     VITAL SIGNS:  T(F): 97.1 (17 @ 22:05)  HR: 66 (17 @ 01:00)  BP: 129/83 (17 @ 10:51)  RR: 11 (17 @ 01:00)  SpO2: 100% (17 @ 01:00)  Wt(kg): --    PHYSICAL EXAM:    Constitutional: sedated  Eyes: conjunctiva and sclera clear  Head: Normocephalic; atraumatic  ENMT: intubated  Neck: Supple; non tender; no masses  Respiratory: diminished breath sounds b/l  Cardiovascular: Regular rate and rhythm. S1 and S2 Normal; No murmurs, gallops or rubs  Gastrointestinal: Soft non-tender non-distended; Normal bowel sounds; No hepatosplenomegaly  Extremities: No clubbing, cyanosis   Vascular: Peripheral pulses palpable 2+ bilaterally  Neurological: sedated  Skin: Warm and dry. No acute rash  Lymph Nodes: No acute cervical adenopathy    MEDICATIONS  (STANDING):  insulin lispro (HumaLOG) corrective regimen sliding scale   SubCutaneous Before meals and at bedtime  dextrose 5%. 1000 milliLiter(s) (50 mL/Hr) IV Continuous <Continuous>  dextrose 50% Injectable 25 Gram(s) IV Push once  sodium chloride 0.9% lock flush 20 milliLiter(s) IV Push once  cyanocobalamin Injectable 1000 MICROGram(s) SubCutaneous every 7 days  dextrose 50% Injectable 12.5 Gram(s) IV Push once  heparin  Injectable 7500 Unit(s) SubCutaneous every 8 hours  fat emulsion 20% Infusion 50 Gram(s) (20.8 mL/Hr) IV Continuous <Continuous>  propofol Infusion 1.662 MICROgram(s)/kG/Min (1 mL/Hr) IV Continuous <Continuous>  piperacillin/tazobactam IVPB. 3.375 Gram(s) IV Intermittent every 6 hours  nystatin Powder 1 Application(s) Topical two times a day  pantoprazole  Injectable 40 milliGRAM(s) IV Push daily  linezolid  IVPB 600 milliGRAM(s) IV Intermittent every 12 hours  fentaNYL   Infusion 0.101 MICROgram(s)/kG/Hr (1 mL/Hr) IV Continuous <Continuous>  calcitriol Injectable 1 MICROGram(s) IV Push <User Schedule>  albumin human 25% IVPB 50 milliLiter(s) IV Intermittent every 8 hours  fat emulsion 20% Infusion 20 Gram(s) (8.33 mL/Hr) IV Continuous <Continuous>  Parenteral Nutrition - Adult 1 Each (69 mL/Hr) TPN Continuous <Continuous>  fluconAZOLE IVPB 400 milliGRAM(s) IV Intermittent every 24 hours    MEDICATIONS  (PRN):  ondansetron Injectable 4 milliGRAM(s) IV Push every 6 hours PRN Nausea  sodium chloride 0.9% lock flush 10 milliLiter(s) IV Push every 1 hour PRN After each medication administration  sodium chloride 0.9% lock flush 10 milliLiter(s) IV Push every 12 hours PRN Lumen of catheter NOT used      Allergies    sulfa drugs (Unknown)  sulfamethoxazole (Other)    Intolerances        LABS:                        7.4    8.0   )-----------( 251      ( 07 Aug 2017 05:12 )             23.5     08-07    138  |  100  |  25<H>  ----------------------------<  194<H>  4.6   |  26  |  0.80    Ca    8.8      07 Aug 2017 20:54  Phos  3.5     08-  Mg     2.1     08-    TPro  5.4<L>  /  Alb  1.5<L>  /  TBili  0.5  /  DBili  x   /  AST  12  /  ALT  7<L>  /  AlkPhos  137<H>  08-06      Urinalysis Basic - ( 06 Aug 2017 02:08 )    Color: Yellow / Appearance: Clear / S.010 / pH: x  Gluc: x / Ketone: NEGATIVE  / Bili: NEGATIVE / Urobili: 0.2 E.U./dL   Blood: x / Protein: 30 mg/dL / Nitrite: NEGATIVE   Leuk Esterase: NEGATIVE / RBC: < 5 /HPF / WBC < 5 /HPF   Sq Epi: x / Non Sq Epi: Few /HPF / Bacteria: Present /HPF        RADIOLOGY & ADDITIONAL TESTS:    Culture - Body Fluid with Gram Stain (17 @ 12:19)    -  Gentamicin: S <=4    -  Gentamicin: S <=4    -  Piperacillin/Tazobactam: S <=16    -  Piperacillin/Tazobactam: S <=16    -  Ampicillin: S <=8    -  Ceftriaxone: S <=1    -  Ceftriaxone: S <=1    -  Trimethoprim/Sulfamethoxazole: S <=2/38    -  Trimethoprim/Sulfamethoxazole: S <=2/38    Gram Stain:   Moderate Gram Negative Rods  Numerous White blood cells    -  Ampicillin: R >16    -  Ampicillin/Sulbactam: S <=8/4    -  Ampicillin/Sulbactam: S <=8/4    -  Cefazolin: S <=8    -  Cefazolin: S <=8    -  Ciprofloxacin: S <=1    -  Ciprofloxacin: S <=1    -  Tobramycin: S <=4    -  Tobramycin: S <=4    Specimen Source: .Body Fluid LEFT lower CHECO    Culture Results:   Numerous Escherichia coli  Few Klebsiella oxytoca    Organism Identification: Escherichia coli  Klebsiella oxytoca    Organism: Escherichia coli    Organism: Klebsiella oxytoca    Method Type: ALISHA    Method Type: ALISHA    Fungus Identification (17 @ 23:38)    Culture Results:   Candida albicans  FLUCONAZOLE= Data established for mucosal disease, and  is generally applicable for invasive disease.  Susceptibility is dependent on achieving  the maximal possible blood level.  Doses of 400 MG/day or more may be required in adults with  normal renal function and body habitus.    Culture - Body Fluid with Gram Stain (17 @ 15:25)    Gram Stain:   No organisms seen  No White blood cells    Specimen Source: .Body Fluid Peritoneal Fluid    Culture Results:   No growth    Culture - Blood (17 @ 11:29)    Specimen Source: .Blood Blood-Peripheral    Culture Results:   No growth at 5 days.    Culture - Blood (17 @ 11:29)    Specimen Source: .Blood Blood-Peripheral    Culture Results:   No growth at 5 days.    Culture - Body Fluid with Gram Stain (17 @ 21:10)    -  Cefazolin: S <=8    -  Ciprofloxacin: R >2    -  Erythromycin: I 4    -  Linezolid: S 2    -  Ampicillin: R >8    -  Ampicillin/Sulbactam: S <=8/4    -  Ciprofloxacin: S <=1    -  Penicillin: R >8    -  Tetra/Doxy: S <=4    -  Ampicillin: S <=8    -  Gentamicin: S <=4    -  Piperacillin/Tazobactam: S <=16    -  Tobramycin: S <=4    -  Ceftriaxone: S <=1    -  Trimethoprim/Sulfamethoxazole: S <=2/38    Gram Stain:   Numerous Gram Negative Rods  Few White blood cells    Specimen Source: .Body Fluid abdominal fluid    Culture Results:   Numerous Escherichia coli  Rare Yeast ...... Choclate plate send to core lab for ID and sensitivity  see#[id number]  Rare Lactobacillus species  Rare Enterococcus faecium  More than three types of organisms recovered may indicate colonization  and/or contamination.  Please call Microbiology immediately if identification and/or  suceptibility is indicated.    Organism Identification: Escherichia coli  Enterococcus faecium    Organism: Escherichia coli    Organism: Enterococcus faecium    Method Type: ALISHA    Method Type: ALISHA    Culture - Blood (17 @ 23:16)    Specimen Source: .Blood Blood-Peripheral    Culture Results:   No growth at 5 days.    Culture - Blood (17 @ 23:16)    Specimen Source: .Blood Blood-Peripheral    Culture Results:   No growth at 5 days.

## 2017-08-07 NOTE — PROGRESS NOTE ADULT - ASSESSMENT
A/P: 57 F s/p  SBR resection, fistula resection, esophagojejunostomy revision w/ post-op course complicated by RTOR for distal anastomosis breakdown, ATN, acute respiratory failure, & septic shock.     Neuro: Propofol gtt, Fentanyl gtt  CV: MAP 65, albumin 25%q8hrs  Resp: AC//40/12/10  GI/FEN: NPO, TPN/lipids, Vit B12, Vit D, Protonix  : Yu for I/O monitoring in critically ill pt; BUN improving  Endo: ISS  ID: anastamotic leak: zosyn (8/1--) , micafungin (7/29-) , linezolid (7/25-8/1; 8/2-)   ///dc: Perioperative Gent/Vanc, colistin (7/29-8/1), vanc (8/1-8/2),  Heme/PPX: SCDs, SQH  Lines: Radha (7/24-), L basilic vein PICC (7/25-), L IJ TLC (7/30-)  Drains: RLQ mediastinal CHECO to bulb suction, LLQ CHECO to water seal. Jose Drain placed L abd. WTD dressing to midline wound.  Wounds: Midline xiphoid to pubis incision with abthera vac.     Sindhu SU A/P: 57 F s/p  SBR resection, fistula resection, esophagojejunostomy revision w/ post-op course complicated by RTOR for distal anastomosis breakdown, ATN, acute respiratory failure, & septic shock.     Neuro: Propofol gtt, Fentanyl gtt  CV: MAP 65, albumin 25%q8hrs  Resp: AC//40/12/10  GI/FEN: NPO, TPN/lipids with prtoein 2 g/kg, 27 npc/kg IBW/day, Vit B12, Vit D, Protonix  : Yu for I/O monitoring in critically ill pt; BUN improving, creatinine improved  Endo: ISS  ID: anastamotic leak: zosyn (8/1--) , micafungin (7/29-) , linezolid (7/25-8/1; 8/2-)   ///dc: Perioperative Gent/Vanc, colistin (7/29-8/1), vanc (8/1-8/2),  Heme/PPX: SCDs, SQH  Lines: Radha (7/24-), L basilic vein PICC (7/25-), L IJ TLC (7/30-)  Drains: RLQ mediastinal CHECO to bulb suction, LLQ CHECO to water seal. Jose Drain placed L abd. WTD dressing to midline wound.  Wounds: Midline xiphoid to pubis incision with abthera vac.

## 2017-08-07 NOTE — BRIEF OPERATIVE NOTE - OPERATION/FINDINGS
Procedure: Exploratory laparotomy, Abdominal Washout, Removal of Mesh  Findings: Retention Sutures removed. Fascia opened. Mesh removed. Frozen and hostile abdomen. ~200cc succus and pus drained. Small amount of biliary fluid noted on prior suture repair of Jejuno-Jejunal anastomosis, however no obvious perforation noted. Unable to explore small bowel or visualize Esophago-jejunal anastomosis due to matted down and adhesed bowel. Abdomen Irrigated with 1.5L. 19Fr Jose Drain placed along Left abdomen. Abdomen left open with ABThera vac Procedure: Exploratory laparotomy, Abdominal Washout, Removal of Mesh  Findings: Retention Sutures removed. Fascia opened. Mesh removed. Frozen and hostile abdomen. ~200cc succus and pus drained. Small amount of biliary fluid noted on prior suture repair of Jejuno-Jejunal anastomosis, however no obvious perforation or breakdown noted. Unable to explore small bowel or visualize Esophago-jejunal anastomosis due to matted down and adhesed bowel. Abdomen Irrigated with 1.5L. 19Fr Jose Drain placed along Left abdomen. Abdomen left open with ABThera vac Procedure: Exploratory laparotomy, Abdominal Washout, Removal of Mesh  Findings: Retention Sutures removed. Fascia opened. Mesh removed. Frozen and hostile abdomen. ~200cc succus and pus drained. Small amount of biliary fluid noted on prior suture repair of Jejuno-Jejunal anastomosis, however no obvious perforation or breakdown noted. Unable to explore small bowel or visualize Esophago-jejunal anastomosis due to matted down and adhesed bowel. Abdomen Irrigated with ~1.5L saline. 19Fr Jose Drain placed along Left abdomen. Abdomen left open with ABThera vac in place.

## 2017-08-07 NOTE — CHART NOTE - NSCHARTNOTEFT_GEN_A_CORE
Admitting Diagnosis:   57 F s/p  SBR resection, fistula resection, esophagojejunostomy revision w/ post-op course complicated by RTOR for distal anastomosis breakdown, ATN, acute respiratory failure, & septic shock. Returning to the OR today for washout.     PAST MEDICAL & SURGICAL HISTORY:  Reflux  Obesity  DVT (deep venous thrombosis):  neck  Diverticulitis  Hypertension  Elective surgery: abodominal wall surgery  dec 2016  Elective surgery: 2016 gastric bypass revision  Gastric bypass status for obesity: gastric sleeve, 2012  S/P breast biopsy: , left  S/P colon resection:   S/P knee surgery: repair ,   right; left  Umbilical hernia:   S/P appendectomy:   S/P tonsillectomy:       Current Nutrition Order: TPN: PICC line: 245 g D, 88 g AA, 50 g lipids, 1581 mL fluid. Currently held 2/2 OR.   Providin kcal, 88 g protein, & 50 g lipids, 1581 mL fluid. GIR = 1.7     GI Issues: Distended    Pain: Unable to assess 2/2 vent.     Skin Integrity: Surgical incision     Labs:       137  |  99  |  28<H>  ----------------------------<  127<H>  4.8   |  28  |  0.90    Ca    8.9      07 Aug 2017 05:15  Phos  3.4       Mg     2.2         TPro  5.4<L>  /  Alb  1.5<L>  /  TBili  0.5  /  DBili  x   /  AST  12  /  ALT  7<L>  /  AlkPhos  137<H>      CAPILLARY BLOOD GLUCOSE  129 (07 Aug 2017 07:00)  127 (06 Aug 2017 22:00)  114 (06 Aug 2017 16:00)  151 (06 Aug 2017 11:00)    Medications:  MEDICATIONS  (STANDING):  insulin lispro (HumaLOG) corrective regimen sliding scale   SubCutaneous Before meals and at bedtime  dextrose 5%. 1000 milliLiter(s) (50 mL/Hr) IV Continuous <Continuous>  dextrose 50% Injectable 25 Gram(s) IV Push once  sodium chloride 0.9% lock flush 20 milliLiter(s) IV Push once  cyanocobalamin Injectable 1000 MICROGram(s) SubCutaneous every 7 days  dextrose 50% Injectable 12.5 Gram(s) IV Push once  heparin  Injectable 7500 Unit(s) SubCutaneous every 8 hours  fat emulsion 20% Infusion 50 Gram(s) (20.8 mL/Hr) IV Continuous <Continuous>  micafungin IVPB 100 milliGRAM(s) IV Intermittent every 24 hours  micafungin IVPB   IV Intermittent   propofol Infusion 1.662 MICROgram(s)/kG/Min (1 mL/Hr) IV Continuous <Continuous>  piperacillin/tazobactam IVPB. 3.375 Gram(s) IV Intermittent every 6 hours  nystatin Powder 1 Application(s) Topical two times a day  pantoprazole  Injectable 40 milliGRAM(s) IV Push daily  linezolid  IVPB 600 milliGRAM(s) IV Intermittent every 12 hours  fentaNYL   Infusion 0.101 MICROgram(s)/kG/Hr (1 mL/Hr) IV Continuous <Continuous>  calcitriol Injectable 1 MICROGram(s) IV Push <User Schedule>  Parenteral Nutrition - Adult 1 Each (55 mL/Hr) TPN Continuous <Continuous>  albumin human 25% IVPB 50 milliLiter(s) IV Intermittent every 8 hours    MEDICATIONS  (PRN):  ondansetron Injectable 4 milliGRAM(s) IV Push every 6 hours PRN Nausea  sodium chloride 0.9% lock flush 10 milliLiter(s) IV Push every 1 hour PRN After each medication administration  sodium chloride 0.9% lock flush 10 milliLiter(s) IV Push every 12 hours PRN Lumen of catheter NOT used      Weight: No new weights to assess    Estimated energy needs using 52 kg IBW:  25-30 kcal/kg (7527-1477 kcal).   1.8-2 g/kg ( g protein).  30-35 mL/kg (2699-0121 mL fluid).:     Subjective: N/A 2/2 vent.     Previous Nutrition Diagnosis: Increased nutrient needs RT fistula & S/p surgery AEB 1.8-2 g/kg protein.     Active [ X  ]  Resolved [   ]     Goal: Meet % of needs via tolerated route.     Recommendations: Increase protein 2/2 need for healing & fistula.     Education: N/A-vent    Risk Level: High [  X ] Moderate [   ] Low [   ]

## 2017-08-07 NOTE — PROGRESS NOTE ADULT - ASSESSMENT
Patient is a 56yo F s/p  SBR resection, fistula resection, esophagojejunostomy revision w/ post-op course complicated by RTOR for distal anastomosis breakdown, ATN, acute respiratory failure, & septic shock.     #Septic shock: recent peritoneal fluid cx w/ E.coli and Kleb; patient on appropriate abx regimen as per sensitivities; no leukocytosis; pt w/ recent ab exploration w/ fecal matter, succus, and pus presence in abdomen 2/2 new anastomosis leak; now s/p wash out; prior peritoneal cx on 7/29 w/ growth of E. faecalis sens to linezolid and tetra/doxy; prior ab cx in 12/2016 and 2/2017 w/ yeast   -may c/w zosyn 3.375mg q6; zosyn w/ coverage of cultured organisms and also provides anaerobic coverage, unlike unasyn  -change micafungin 100mg qd to diflucan 4000mg qd  -may discontinue zyvox 600mg bid as pt s/p 10d tx; though pt w/ ab exploration today w/ anastomosis leak, no indication of abdominal infection   -obtain blood cx, urine cx if pt spikes fever o/n Patient is a 58yo F s/p  SBR resection, fistula resection, esophagojejunostomy revision w/ post-op course complicated by RTOR for distal anastomosis breakdown, ATN, acute respiratory failure, & septic shock.     #Septic shock: recent peritoneal fluid cx w/ E.coli and Kleb; patient on appropriate abx regimen as per sensitivities; no leukocytosis; pt w/ recent ab exploration w/ fecal matter, succus, and pus presence in abdomen 2/2 new anastomosis leak; now s/p wash out; prior peritoneal cx on 7/29 w/ growth of E. faecalis sens to linezolid and tetra/doxy; prior ab cx in 12/2016 and 2/2017 w/ yeast   -may c/w zosyn 3.375mg q6; zosyn w/ coverage of cultured organisms and also provides anaerobic coverage, unlike unasyn  -change micafungin 100mg qd to diflucan 4000mg qd  -may discontinue zyvox 600mg bid as pt s/p 10d tx; though pt w/ ab exploration today w/ anastomosis leak, no indication of abdominal infection   -obtain blood cx, urine cx if pt spikes fever o/n    High level of decision making and complexity

## 2017-08-07 NOTE — PROGRESS NOTE ADULT - SUBJECTIVE AND OBJECTIVE BOX
O/N: Pre-op labs ordered. plan for OR this AM.   8/7: Pt to OR this morning with Dr. Roach for Ex-Lap, abdominal washout/removal of mesh. No complications intra-op/post-op. 200cc succus and push drained. 19Fr Jose Drain places on L side abd, Abthera vac in place. EBL 50cc, IVF 500cc, UO 350ml    Pt seen at bedside. Intubated, responsive to light touch and verbal stimulation. No complaints at this time 2/2 sedation. ROS non-contributory.     Vital Signs Last 24 Hrs  T(C): 37.3 (07 Aug 2017 08:30), Max: 37.6 (06 Aug 2017 17:45)  T(F): 99.2 (07 Aug 2017 08:30), Max: 99.7 (06 Aug 2017 17:45)  HR: 70 (07 Aug 2017 14:00) (66 - 80)  BP: 129/83 (07 Aug 2017 10:51) (116/62 - 129/83)  BP(mean): 103 (07 Aug 2017 10:51) (103 - 103)  RR: 11 (07 Aug 2017 14:00) (7 - 18)  SpO2: 100% (07 Aug 2017 14:00) (97% - 100%)       CAPILLARY BLOOD GLUCOSE  114 (07 Aug 2017 11:45)  118 (07 Aug 2017 09:38)  129 (07 Aug 2017 07:00)  127 (06 Aug 2017 22:00)  114 (06 Aug 2017 16:00)    Physical Exam   GEN: pt is intubated. arousable by touch and verbal stimulation.   HEENT: PEERL, EOMI, MMM, no LAD  Lungs: diminished breath sounds b/l L>R, CTA at the apices of the lung b/l. no wheezing, rhonchi, rales   Cardiac: S1/S2 rrr, no gallops, murmur, rubs   Abd: softly distended, midline incision with retention sutures and wet-to-dry dressings. Brownish discoloration oozing from incision site. 1 mediastinal CHECO draining serosanguinous fluid. 1 LLQ CHECO draining serosanguinous fluid. No active BS, no guarding, rigidity  : stone in place- draining yellow urine. BUN/Cr stable showing improvement   Ext: 2+ pitting edema UE>LE  Neuro: pt sedating, follows simple comands, no focal deficits   Skin: fungal infection in intertriginous         @ 07: @ 07:00  --------------------------------------------------------  IN: 3116 mL / OUT: 3765 mL / NET: -649 mL     @ 07: @ 14:05  --------------------------------------------------------  IN: 475 mL / OUT: 920 mL / NET: -445 mL      Mode: AC/ CMV (Assist Control/ Continuous Mandatory Ventilation)  RR (machine): 12  TV (machine): 450  FiO2: 40  PEEP: 10  ITime: 1  MAP: 13  PIP: 27    ondansetron Injectable 4 milliGRAM(s) IV Push every 6 hours PRN  insulin lispro (HumaLOG) corrective regimen sliding scale   SubCutaneous Before meals and at bedtime  dextrose 5%. 1000 milliLiter(s) IV Continuous <Continuous>  dextrose 50% Injectable 25 Gram(s) IV Push once  sodium chloride 0.9% lock flush 10 milliLiter(s) IV Push every 1 hour PRN  sodium chloride 0.9% lock flush 10 milliLiter(s) IV Push every 12 hours PRN  sodium chloride 0.9% lock flush 20 milliLiter(s) IV Push once  cyanocobalamin Injectable 1000 MICROGram(s) SubCutaneous every 7 days  dextrose 50% Injectable 12.5 Gram(s) IV Push once  heparin  Injectable 7500 Unit(s) SubCutaneous every 8 hours  fat emulsion 20% Infusion 50 Gram(s) IV Continuous <Continuous>  micafungin IVPB 100 milliGRAM(s) IV Intermittent every 24 hours  micafungin IVPB   IV Intermittent   propofol Infusion 1.662 MICROgram(s)/kG/Min IV Continuous <Continuous>  piperacillin/tazobactam IVPB. 3.375 Gram(s) IV Intermittent every 6 hours  nystatin Powder 1 Application(s) Topical two times a day  pantoprazole  Injectable 40 milliGRAM(s) IV Push daily  linezolid  IVPB 600 milliGRAM(s) IV Intermittent every 12 hours  fentaNYL   Infusion 0.101 MICROgram(s)/kG/Hr IV Continuous <Continuous>  calcitriol Injectable 1 MICROGram(s) IV Push <User Schedule>  Parenteral Nutrition - Adult 1 Each TPN Continuous <Continuous>  albumin human 25% IVPB 50 milliLiter(s) IV Intermittent every 8 hours  fat emulsion 20% Infusion 20 Gram(s) IV Continuous <Continuous>  Parenteral Nutrition - Adult 1 Each TPN Continuous <Continuous>    LABS:  ABG - ( 07 Aug 2017 09:35 )  pH: 7.47  /  pCO2: 38    /  pO2: 265   / HCO3: 27    / Base Excess: 3.1   /  SaO2: x                   CBC Full  -  ( 07 Aug 2017 05:12 )  WBC Count : 8.0 K/uL  Hemoglobin : 7.4 g/dL  Hematocrit : 23.5 %  Platelet Count - Automated : 251 K/uL  Mean Cell Volume : 91.4 fL  Mean Cell Hemoglobin : 28.8 pg  Mean Cell Hemoglobin Concentration : 31.5 g/dL  Auto Neutrophil # : x  Auto Lymphocyte # : x  Auto Monocyte # : x  Auto Eosinophil # : x  Auto Basophil # : x  Auto Neutrophil % : x  Auto Lymphocyte % : x  Auto Monocyte % : x  Auto Eosinophil % : x  Auto Basophil % : x    08-    137  |  99  |  28<H>  ----------------------------<  127<H>  4.8   |  28  |  0.90    Ca    8.9      07 Aug 2017 05:15  Phos  3.4     08-  Mg     2.2     08-07    TPro  5.4<L>  /  Alb  1.5<L>  /  TBili  0.5  /  DBili  x   /  AST  12  /  ALT  7<L>  /  AlkPhos  137<H>  08-      Urinalysis Basic - ( 06 Aug 2017 02:08 )    Color: Yellow / Appearance: Clear / S.010 / pH: x  Gluc: x / Ketone: NEGATIVE  / Bili: NEGATIVE / Urobili: 0.2 E.U./dL   Blood: x / Protein: 30 mg/dL / Nitrite: NEGATIVE   Leuk Esterase: NEGATIVE / RBC: < 5 /HPF / WBC < 5 /HPF   Sq Epi: x / Non Sq Epi: Few /HPF / Bacteria: Present /HPF O/N: Pre-op labs ordered. plan for OR this AM.   8/7: Pt to OR this morning with Dr. Roach for Ex-Lap, abdominal washout/removal of mesh. No complications intra-op/post-op. 200cc succus and push drained. 19Fr Jose Drain places on L side abd, Abthera vac in place. EBL 50cc, IVF 500cc, UO 350ml    Pt seen at bedside. Intubated, responsive to light touch and verbal stimulation. No complaints at this time 2/2 sedation. ROS not obtainable.     Vital Signs Last 24 Hrs  T(C): 37.3 (07 Aug 2017 08:30), Max: 37.6 (06 Aug 2017 17:45)  T(F): 99.2 (07 Aug 2017 08:30), Max: 99.7 (06 Aug 2017 17:45)  HR: 70 (07 Aug 2017 14:00) (66 - 80)  BP: 129/83 (07 Aug 2017 10:51) (116/62 - 129/83)  BP(mean): 103 (07 Aug 2017 10:51) (103 - 103)  RR: 11 (07 Aug 2017 14:00) (7 - 18)  SpO2: 100% (07 Aug 2017 14:00) (97% - 100%)       CAPILLARY BLOOD GLUCOSE  114 (07 Aug 2017 11:45)  118 (07 Aug 2017 09:38)  129 (07 Aug 2017 07:00)  127 (06 Aug 2017 22:00)  114 (06 Aug 2017 16:00)    Physical Exam   GEN: pt is intubated. arousable by touch and verbal stimulation.   HEENT: PEERL, EOMI, MMM, no LAD  Lungs: diminished breath sounds b/l L>R, CTA at the apices of the lung b/l. no wheezing, rhonchi, rales   Cardiac: S1/S2 rrr, no gallops, murmur, rubs   Abd: softly distended, midline incision with retention sutures and wet-to-dry dressings. Brownish discoloration oozing from incision site. 1 mediastinal CHECO draining serosanguinous fluid. 1 LLQ CHECO draining serosanguinous fluid. No active BS, no guarding, rigidity  : stone in place- draining yellow urine. BUN/Cr stable showing improvement   Ext: 2+ pitting edema UE>LE  Vasc: 2+ radial and 1+ DP pulses  MSK: no joint swelling  Neuro: pt sedated, follows simple comands, no focal deficits   Skin: fungal infection in intertriginous groin area         @ 07: @ 07:00  --------------------------------------------------------  IN: 3116 mL / OUT: 3765 mL / NET: -649 mL     @ 07: @ 14:05  --------------------------------------------------------  IN: 475 mL / OUT: 920 mL / NET: -445 mL      Mode: AC/ CMV (Assist Control/ Continuous Mandatory Ventilation)  RR (machine): 12  TV (machine): 450  FiO2: 40  PEEP: 10  ITime: 1  MAP: 13  PIP: 27    ondansetron Injectable 4 milliGRAM(s) IV Push every 6 hours PRN  insulin lispro (HumaLOG) corrective regimen sliding scale   SubCutaneous Before meals and at bedtime  dextrose 5%. 1000 milliLiter(s) IV Continuous <Continuous>  dextrose 50% Injectable 25 Gram(s) IV Push once  sodium chloride 0.9% lock flush 10 milliLiter(s) IV Push every 1 hour PRN  sodium chloride 0.9% lock flush 10 milliLiter(s) IV Push every 12 hours PRN  sodium chloride 0.9% lock flush 20 milliLiter(s) IV Push once  cyanocobalamin Injectable 1000 MICROGram(s) SubCutaneous every 7 days  dextrose 50% Injectable 12.5 Gram(s) IV Push once  heparin  Injectable 7500 Unit(s) SubCutaneous every 8 hours  fat emulsion 20% Infusion 50 Gram(s) IV Continuous <Continuous>  micafungin IVPB 100 milliGRAM(s) IV Intermittent every 24 hours  micafungin IVPB   IV Intermittent   propofol Infusion 1.662 MICROgram(s)/kG/Min IV Continuous <Continuous>  piperacillin/tazobactam IVPB. 3.375 Gram(s) IV Intermittent every 6 hours  nystatin Powder 1 Application(s) Topical two times a day  pantoprazole  Injectable 40 milliGRAM(s) IV Push daily  linezolid  IVPB 600 milliGRAM(s) IV Intermittent every 12 hours  fentaNYL   Infusion 0.101 MICROgram(s)/kG/Hr IV Continuous <Continuous>  calcitriol Injectable 1 MICROGram(s) IV Push <User Schedule>  Parenteral Nutrition - Adult 1 Each TPN Continuous <Continuous>  albumin human 25% IVPB 50 milliLiter(s) IV Intermittent every 8 hours  fat emulsion 20% Infusion 20 Gram(s) IV Continuous <Continuous>  Parenteral Nutrition - Adult 1 Each TPN Continuous <Continuous>    LABS:  ABG - ( 07 Aug 2017 09:35 )  pH: 7.47  /  pCO2: 38    /  pO2: 265   / HCO3: 27    / Base Excess: 3.1   /  SaO2: x                   CBC Full  -  ( 07 Aug 2017 05:12 )  WBC Count : 8.0 K/uL  Hemoglobin : 7.4 g/dL  Hematocrit : 23.5 %  Platelet Count - Automated : 251 K/uL  Mean Cell Volume : 91.4 fL  Mean Cell Hemoglobin : 28.8 pg  Mean Cell Hemoglobin Concentration : 31.5 g/dL  Auto Neutrophil # : x  Auto Lymphocyte # : x  Auto Monocyte # : x  Auto Eosinophil # : x  Auto Basophil # : x  Auto Neutrophil % : x  Auto Lymphocyte % : x  Auto Monocyte % : x  Auto Eosinophil % : x  Auto Basophil % : x    08-    137  |  99  |  28<H>  ----------------------------<  127<H>  4.8   |  28  |  0.90    Ca    8.9      07 Aug 2017 05:15  Phos  3.4     08-07  Mg     2.2     08-07    TPro  5.4<L>  /  Alb  1.5<L>  /  TBili  0.5  /  DBili  x   /  AST  12  /  ALT  7<L>  /  AlkPhos  137<H>  -      Urinalysis Basic - ( 06 Aug 2017 02:08 )    Color: Yellow / Appearance: Clear / S.010 / pH: x  Gluc: x / Ketone: NEGATIVE  / Bili: NEGATIVE / Urobili: 0.2 E.U./dL   Blood: x / Protein: 30 mg/dL / Nitrite: NEGATIVE   Leuk Esterase: NEGATIVE / RBC: < 5 /HPF / WBC < 5 /HPF   Sq Epi: x / Non Sq Epi: Few /HPF / Bacteria: Present /HPF

## 2017-08-08 LAB
ALBUMIN SERPL ELPH-MCNC: 2.1 G/DL — LOW (ref 3.3–5)
ALP SERPL-CCNC: 130 U/L — HIGH (ref 40–120)
ALT FLD-CCNC: 7 U/L — LOW (ref 10–45)
ANION GAP SERPL CALC-SCNC: 11 MMOL/L — SIGNIFICANT CHANGE UP (ref 5–17)
AST SERPL-CCNC: 11 U/L — SIGNIFICANT CHANGE UP (ref 10–40)
BILIRUB SERPL-MCNC: 1.1 MG/DL — SIGNIFICANT CHANGE UP (ref 0.2–1.2)
BUN SERPL-MCNC: 24 MG/DL — HIGH (ref 7–23)
CALCIUM SERPL-MCNC: 9.3 MG/DL — SIGNIFICANT CHANGE UP (ref 8.4–10.5)
CHLORIDE SERPL-SCNC: 102 MMOL/L — SIGNIFICANT CHANGE UP (ref 96–108)
CO2 SERPL-SCNC: 25 MMOL/L — SIGNIFICANT CHANGE UP (ref 22–31)
CREAT SERPL-MCNC: 0.8 MG/DL — SIGNIFICANT CHANGE UP (ref 0.5–1.3)
GLUCOSE SERPL-MCNC: 122 MG/DL — HIGH (ref 70–99)
HCT VFR BLD CALC: 24.4 % — LOW (ref 34.5–45)
HGB BLD-MCNC: 7.7 G/DL — LOW (ref 11.5–15.5)
MAGNESIUM SERPL-MCNC: 2.1 MG/DL — SIGNIFICANT CHANGE UP (ref 1.6–2.6)
MCHC RBC-ENTMCNC: 28.7 PG — SIGNIFICANT CHANGE UP (ref 27–34)
MCHC RBC-ENTMCNC: 31.6 G/DL — LOW (ref 32–36)
MCV RBC AUTO: 91 FL — SIGNIFICANT CHANGE UP (ref 80–100)
PHOSPHATE SERPL-MCNC: 3.6 MG/DL — SIGNIFICANT CHANGE UP (ref 2.5–4.5)
PLATELET # BLD AUTO: 274 K/UL — SIGNIFICANT CHANGE UP (ref 150–400)
POTASSIUM SERPL-MCNC: 4.6 MMOL/L — SIGNIFICANT CHANGE UP (ref 3.5–5.3)
POTASSIUM SERPL-SCNC: 4.6 MMOL/L — SIGNIFICANT CHANGE UP (ref 3.5–5.3)
PROT SERPL-MCNC: 5.6 G/DL — LOW (ref 6–8.3)
RBC # BLD: 2.68 M/UL — LOW (ref 3.8–5.2)
RBC # FLD: 15.8 % — SIGNIFICANT CHANGE UP (ref 10.3–16.9)
SODIUM SERPL-SCNC: 138 MMOL/L — SIGNIFICANT CHANGE UP (ref 135–145)
TRIGL SERPL-MCNC: 175 MG/DL — HIGH (ref 10–149)
WBC # BLD: 12.1 K/UL — HIGH (ref 3.8–10.5)
WBC # FLD AUTO: 12.1 K/UL — HIGH (ref 3.8–10.5)

## 2017-08-08 PROCEDURE — 71010: CPT | Mod: 26

## 2017-08-08 PROCEDURE — 99233 SBSQ HOSP IP/OBS HIGH 50: CPT | Mod: GC

## 2017-08-08 RX ORDER — I.V. FAT EMULSION 20 G/100ML
20 EMULSION INTRAVENOUS ONCE
Qty: 0 | Refills: 0 | Status: COMPLETED | OUTPATIENT
Start: 2017-08-08 | End: 2017-08-08

## 2017-08-08 RX ORDER — ELECTROLYTE SOLUTION,INJ
1 VIAL (ML) INTRAVENOUS
Qty: 0 | Refills: 0 | Status: DISCONTINUED | OUTPATIENT
Start: 2017-08-08 | End: 2017-08-08

## 2017-08-08 RX ORDER — I.V. FAT EMULSION 20 G/100ML
20 EMULSION INTRAVENOUS
Qty: 0 | Refills: 0 | Status: DISCONTINUED | OUTPATIENT
Start: 2017-08-08 | End: 2017-08-08

## 2017-08-08 RX ORDER — FUROSEMIDE 40 MG
20 TABLET ORAL ONCE
Qty: 0 | Refills: 0 | Status: COMPLETED | OUTPATIENT
Start: 2017-08-08 | End: 2017-08-08

## 2017-08-08 RX ADMIN — PANTOPRAZOLE SODIUM 40 MILLIGRAM(S): 20 TABLET, DELAYED RELEASE ORAL at 11:22

## 2017-08-08 RX ADMIN — NYSTATIN CREAM 1 APPLICATION(S): 100000 CREAM TOPICAL at 05:26

## 2017-08-08 RX ADMIN — LINEZOLID 300 MILLIGRAM(S): 600 INJECTION, SOLUTION INTRAVENOUS at 06:26

## 2017-08-08 RX ADMIN — HEPARIN SODIUM 7500 UNIT(S): 5000 INJECTION INTRAVENOUS; SUBCUTANEOUS at 05:24

## 2017-08-08 RX ADMIN — NYSTATIN CREAM 1 APPLICATION(S): 100000 CREAM TOPICAL at 17:42

## 2017-08-08 RX ADMIN — PIPERACILLIN AND TAZOBACTAM 200 GRAM(S): 4; .5 INJECTION, POWDER, LYOPHILIZED, FOR SOLUTION INTRAVENOUS at 23:40

## 2017-08-08 RX ADMIN — FLUCONAZOLE 100 MILLIGRAM(S): 150 TABLET ORAL at 10:11

## 2017-08-08 RX ADMIN — PIPERACILLIN AND TAZOBACTAM 200 GRAM(S): 4; .5 INJECTION, POWDER, LYOPHILIZED, FOR SOLUTION INTRAVENOUS at 12:12

## 2017-08-08 RX ADMIN — PIPERACILLIN AND TAZOBACTAM 200 GRAM(S): 4; .5 INJECTION, POWDER, LYOPHILIZED, FOR SOLUTION INTRAVENOUS at 05:25

## 2017-08-08 RX ADMIN — LINEZOLID 300 MILLIGRAM(S): 600 INJECTION, SOLUTION INTRAVENOUS at 17:30

## 2017-08-08 RX ADMIN — FENTANYL CITRATE 1 MICROGRAM(S)/KG/HR: 50 INJECTION INTRAVENOUS at 12:12

## 2017-08-08 RX ADMIN — HEPARIN SODIUM 7500 UNIT(S): 5000 INJECTION INTRAVENOUS; SUBCUTANEOUS at 21:42

## 2017-08-08 RX ADMIN — Medication 20 MILLIGRAM(S): at 11:22

## 2017-08-08 RX ADMIN — Medication 50 MILLILITER(S): at 05:25

## 2017-08-08 RX ADMIN — PIPERACILLIN AND TAZOBACTAM 200 GRAM(S): 4; .5 INJECTION, POWDER, LYOPHILIZED, FOR SOLUTION INTRAVENOUS at 18:29

## 2017-08-08 RX ADMIN — HEPARIN SODIUM 7500 UNIT(S): 5000 INJECTION INTRAVENOUS; SUBCUTANEOUS at 13:56

## 2017-08-08 RX ADMIN — Medication 1 EACH: at 17:32

## 2017-08-08 RX ADMIN — I.V. FAT EMULSION 8.33 GRAM(S): 20 EMULSION INTRAVENOUS at 21:42

## 2017-08-08 RX ADMIN — Medication 2: at 07:05

## 2017-08-08 NOTE — PROGRESS NOTE ADULT - ASSESSMENT
Patient is a 56yo F s/p  SBR resection, fistula resection, esophagojejunostomy revision w/ post-op course complicated by RTOR for distal anastomosis breakdown, ATN, acute respiratory failure, & septic shock.     #Septic shock: recent peritoneal fluid cx w/ E.coli and Kleb; patient on appropriate abx regimen as per sensitivities; no leukocytosis; pt w/ recent ab exploration w/ fecal matter, succus, and pus presence in abdomen 2/2 new anastomosis leak; now s/p wash out; prior peritoneal cx on 7/29 w/ growth of E. faecalis sens to linezolid and tetra/doxy; prior ab cx in 12/2016 and 2/2017 w/ yeast   -Last night, had fever 102F and WBC increased from 8 to 12, however likely post-op fever and leukocytosis. Trend fever curve and WBC.  -may c/w zosyn 3.375mg q6; zosyn w/ coverage of cultured organisms and also provides anaerobic coverage  -c/w diflucan 4000mg qd  -c/w zyvox 600mg bid until 8/10 - discussed with primary team and   -obtain blood cx, urine cx if pt spikes fever o/n Patient is a 58yo F s/p  SBR resection, fistula resection, esophagojejunostomy revision w/ post-op course complicated by RTOR for distal anastomosis breakdown, ATN, acute respiratory failure, & septic shock.     #Septic shock: recent peritoneal fluid cx w/ E.coli and Kleb; patient on appropriate abx regimen as per sensitivities; no leukocytosis; pt w/ recent ab exploration w/ fecal matter, succus, and pus presence in abdomen 2/2 new anastomosis leak; now s/p wash out; prior peritoneal cx on 7/29 w/ growth of E. faecalis sens to linezolid and tetra/doxy; prior ab cx in 12/2016 and 2/2017 w/ yeast   -Last night, had fever 102F and WBC increased from 8 to 12, however likely post-op fever and leukocytosis. Trend fever curve and WBC.  -may c/w zosyn 3.375mg q6; zosyn w/ coverage of cultured organisms and also provides anaerobic coverage  -c/w diflucan 4000mg qd  -c/w zyvox 600mg bid until 8/10 - discussed with primary team and   -obtain blood cx, urine cx if pt spikes fever o/n    high level of complexity and decision making

## 2017-08-08 NOTE — PROGRESS NOTE ADULT - SUBJECTIVE AND OBJECTIVE BOX
INTERVAL HPI/OVERNIGHT EVENTS: Afebrile, recent abd exploration () revealing feculent matter, succus, and pus. Had fever 102F and WBC increased from 8 to 12, however likely post-op fever and leukocytosis.    HPI:  56F w/PMHx of HTN, gastric bypass in  c/b stricture, corkscrewed sleeve and chronic leak, revised on 16 with protracted (70-day) postoperative course complicated by MDR infections, a C-diff infection, respiratory compromise due to plugging/PNA/effusions requiring multiple interventions and a trach, as well as a continued leak requiring a draining double-pigtail with stenting performed by GI. 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 Denies CP, SOB, N/V, diarrhea. (2017 14:15)    Vital Signs Last 24 Hrs  T(C): 37 (08 Aug 2017 18:09), Max: 37.6 (08 Aug 2017 05:23)  T(F): 98.6 (08 Aug 2017 18:09), Max: 99.7 (08 Aug 2017 05:23)  HR: 72 (08 Aug 2017 19:00) (66 - 82)  BP: 103/58 (08 Aug 2017 19:00) (100/54 - 132/65)  BP(mean): 74 (08 Aug 2017 19:00) (70 - 90)  RR: 12 (08 Aug 2017 19:00) (11 - 16)  SpO2: 98% (08 Aug 2017 19:00) (95% - 100%)    PHYSICAL EXAM:    Constitutional: sedated  Eyes: conjunctiva and sclera clear  Head: Normocephalic; atraumatic  ENMT: intubated  Neck: Supple; non tender; no masses  Respiratory: diminished breath sounds b/l  Cardiovascular: Regular rate and rhythm. S1 and S2 Normal; No murmurs, gallops or rubs  Gastrointestinal: Soft non-tender non-distended; Normal bowel sounds; No hepatosplenomegaly  Extremities: No clubbing, cyanosis   Vascular: Peripheral pulses palpable 2+ bilaterally  Neurological: sedated  Skin: Warm and dry. No acute rash  Lymph Nodes: No acute cervical adenopathy    MEDICATIONS  (STANDING):  insulin lispro (HumaLOG) corrective regimen sliding scale   SubCutaneous Before meals and at bedtime  dextrose 5%. 1000 milliLiter(s) (50 mL/Hr) IV Continuous <Continuous>  dextrose 50% Injectable 25 Gram(s) IV Push once  sodium chloride 0.9% lock flush 20 milliLiter(s) IV Push once  cyanocobalamin Injectable 1000 MICROGram(s) SubCutaneous every 7 days  dextrose 50% Injectable 12.5 Gram(s) IV Push once  heparin  Injectable 7500 Unit(s) SubCutaneous every 8 hours  fat emulsion 20% Infusion 50 Gram(s) (20.8 mL/Hr) IV Continuous <Continuous>  propofol Infusion 1.662 MICROgram(s)/kG/Min (1 mL/Hr) IV Continuous <Continuous>  piperacillin/tazobactam IVPB. 3.375 Gram(s) IV Intermittent every 6 hours  nystatin Powder 1 Application(s) Topical two times a day  pantoprazole  Injectable 40 milliGRAM(s) IV Push daily  linezolid  IVPB 600 milliGRAM(s) IV Intermittent every 12 hours  fentaNYL   Infusion 0.101 MICROgram(s)/kG/Hr (1 mL/Hr) IV Continuous <Continuous>  calcitriol Injectable 1 MICROGram(s) IV Push <User Schedule>  Parenteral Nutrition - Adult 1 Each (69 mL/Hr) TPN Continuous <Continuous>  fluconAZOLE IVPB 400 milliGRAM(s) IV Intermittent every 24 hours  Parenteral Nutrition - Adult 1 Each (65 mL/Hr) TPN Continuous <Continuous>  fat emulsion 20% IVPB 20 Gram(s) IV Intermittent once    MEDICATIONS  (PRN):  ondansetron Injectable 4 milliGRAM(s) IV Push every 6 hours PRN Nausea  sodium chloride 0.9% lock flush 10 milliLiter(s) IV Push every 1 hour PRN After each medication administration  sodium chloride 0.9% lock flush 10 milliLiter(s) IV Push every 12 hours PRN Lumen of catheter NOT used    Allergies    sulfa drugs (Unknown)  sulfamethoxazole (Other)    Intolerances        LABS:                        7.4    8.0   )-----------( 251      ( 07 Aug 2017 05:12 )             23.5     08-07    138  |  100  |  25<H>  ----------------------------<  194<H>  4.6   |  26  |  0.80    Ca    8.8      07 Aug 2017 20:54  Phos  3.5     08-07  Mg     2.1     08-07    TPro  5.4<L>  /  Alb  1.5<L>  /  TBili  0.5  /  DBili  x   /  AST  12  /  ALT  7<L>  /  AlkPhos  137<H>        Urinalysis Basic - ( 06 Aug 2017 02:08 )    Color: Yellow / Appearance: Clear / S.010 / pH: x  Gluc: x / Ketone: NEGATIVE  / Bili: NEGATIVE / Urobili: 0.2 E.U./dL   Blood: x / Protein: 30 mg/dL / Nitrite: NEGATIVE   Leuk Esterase: NEGATIVE / RBC: < 5 /HPF / WBC < 5 /HPF   Sq Epi: x / Non Sq Epi: Few /HPF / Bacteria: Present /HPF        RADIOLOGY & ADDITIONAL TESTS:    Culture - Body Fluid with Gram Stain (17 @ 12:19)    -  Gentamicin: S <=4    -  Gentamicin: S <=4    -  Piperacillin/Tazobactam: S <=16    -  Piperacillin/Tazobactam: S <=16    -  Ampicillin: S <=8    -  Ceftriaxone: S <=1    -  Ceftriaxone: S <=1    -  Trimethoprim/Sulfamethoxazole: S <=2/38    -  Trimethoprim/Sulfamethoxazole: S <=2/38    Gram Stain:   Moderate Gram Negative Rods  Numerous White blood cells    -  Ampicillin: R >16    -  Ampicillin/Sulbactam: S <=8/4    -  Ampicillin/Sulbactam: S <=8/4    -  Cefazolin: S <=8    -  Cefazolin: S <=8    -  Ciprofloxacin: S <=1    -  Ciprofloxacin: S <=1    -  Tobramycin: S <=4    -  Tobramycin: S <=4    Specimen Source: .Body Fluid LEFT lower CHECO    Culture Results:   Numerous Escherichia coli  Few Klebsiella oxytoca    Organism Identification: Escherichia coli  Klebsiella oxytoca    Organism: Escherichia coli    Organism: Klebsiella oxytoca    Method Type: ALISHA    Method Type: ALISHA    Fungus Identification (17 @ 23:38)    Culture Results:   Candida albicans  FLUCONAZOLE= Data established for mucosal disease, and  is generally applicable for invasive disease.  Susceptibility is dependent on achieving  the maximal possible blood level.  Doses of 400 MG/day or more may be required in adults with  normal renal function and body habitus.    Culture - Body Fluid with Gram Stain (17 @ 15:25)    Gram Stain:   No organisms seen  No White blood cells    Specimen Source: .Body Fluid Peritoneal Fluid    Culture Results:   No growth    Culture - Blood (17 @ 11:29)    Specimen Source: .Blood Blood-Peripheral    Culture Results:   No growth at 5 days.    Culture - Blood (17 @ 11:29)    Specimen Source: .Blood Blood-Peripheral    Culture Results:   No growth at 5 days.    Culture - Body Fluid with Gram Stain (17 @ 21:10)    -  Cefazolin: S <=8    -  Ciprofloxacin: R >2    -  Erythromycin: I 4    -  Linezolid: S 2    -  Ampicillin: R >8    -  Ampicillin/Sulbactam: S <=8/4    -  Ciprofloxacin: S <=1    -  Penicillin: R >8    -  Tetra/Doxy: S <=4    -  Ampicillin: S <=8    -  Gentamicin: S <=4    -  Piperacillin/Tazobactam: S <=16    -  Tobramycin: S <=4    -  Ceftriaxone: S <=1    -  Trimethoprim/Sulfamethoxazole: S <=2/38    Gram Stain:   Numerous Gram Negative Rods  Few White blood cells    Specimen Source: .Body Fluid abdominal fluid    Culture Results:   Numerous Escherichia coli  Rare Yeast ...... Choclate plate send to core lab for ID and sensitivity  see#[id number]  Rare Lactobacillus species  Rare Enterococcus faecium  More than three types of organisms recovered may indicate colonization  and/or contamination.  Please call Microbiology immediately if identification and/or  suceptibility is indicated.    Organism Identification: Escherichia coli  Enterococcus faecium    Organism: Escherichia coli    Organism: Enterococcus faecium    Method Type: ALISHA    Method Type: ALISHA    Culture - Blood (17 @ 23:16)    Specimen Source: .Blood Blood-Peripheral    Culture Results:   No growth at 5 days.    Culture - Blood (17 @ 23:16)    Specimen Source: .Blood Blood-Peripheral    Culture Results:   No growth at 5 days. Critical Care    INTERVAL HPI/OVERNIGHT EVENTS: Afebrile, recent abd exploration () revealing feculent matter, succus, and pus. Had fever 102F and WBC increased from 8 to 12, however likely post-op fever and leukocytosis.    HPI:  56F w/PMHx of HTN, gastric bypass in  c/b stricture, corkscrewed sleeve and chronic leak, revised on 16 with protracted (70-day) postoperative course complicated by MDR infections, a C-diff infection, respiratory compromise due to plugging/PNA/effusions requiring multiple interventions and a trach, as well as a continued leak requiring a draining double-pigtail with stenting performed by GI. 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 Denies CP, SOB, N/V, diarrhea. (2017 14:15)    Vital Signs Last 24 Hrs  T(C): 37 (08 Aug 2017 18:09), Max: 37.6 (08 Aug 2017 05:23)  T(F): 98.6 (08 Aug 2017 18:09), Max: 99.7 (08 Aug 2017 05:23)  HR: 72 (08 Aug 2017 19:00) (66 - 82)  BP: 103/58 (08 Aug 2017 19:00) (100/54 - 132/65)  BP(mean): 74 (08 Aug 2017 19:00) (70 - 90)  RR: 12 (08 Aug 2017 19:00) (11 - 16)  SpO2: 98% (08 Aug 2017 19:00) (95% - 100%)    PHYSICAL EXAM:    Constitutional: sedated  Eyes: conjunctiva and sclera clear  Head: Normocephalic; atraumatic  ENMT: intubated  Neck: Supple; non tender; no masses  Respiratory: diminished breath sounds b/l  Cardiovascular: Regular rate and rhythm. S1 and S2 Normal; No murmurs, gallops or rubs  Gastrointestinal: Soft non-tender non-distended; Normal bowel sounds; No hepatosplenomegaly  Extremities: No clubbing, cyanosis   Vascular: Peripheral pulses palpable 2+ bilaterally  Neurological: sedated  Skin: Warm and dry. No acute rash  Lymph Nodes: No acute cervical adenopathy    MEDICATIONS  (STANDING):  insulin lispro (HumaLOG) corrective regimen sliding scale   SubCutaneous Before meals and at bedtime  dextrose 5%. 1000 milliLiter(s) (50 mL/Hr) IV Continuous <Continuous>  dextrose 50% Injectable 25 Gram(s) IV Push once  sodium chloride 0.9% lock flush 20 milliLiter(s) IV Push once  cyanocobalamin Injectable 1000 MICROGram(s) SubCutaneous every 7 days  dextrose 50% Injectable 12.5 Gram(s) IV Push once  heparin  Injectable 7500 Unit(s) SubCutaneous every 8 hours  fat emulsion 20% Infusion 50 Gram(s) (20.8 mL/Hr) IV Continuous <Continuous>  propofol Infusion 1.662 MICROgram(s)/kG/Min (1 mL/Hr) IV Continuous <Continuous>  piperacillin/tazobactam IVPB. 3.375 Gram(s) IV Intermittent every 6 hours  nystatin Powder 1 Application(s) Topical two times a day  pantoprazole  Injectable 40 milliGRAM(s) IV Push daily  linezolid  IVPB 600 milliGRAM(s) IV Intermittent every 12 hours  fentaNYL   Infusion 0.101 MICROgram(s)/kG/Hr (1 mL/Hr) IV Continuous <Continuous>  calcitriol Injectable 1 MICROGram(s) IV Push <User Schedule>  Parenteral Nutrition - Adult 1 Each (69 mL/Hr) TPN Continuous <Continuous>  fluconAZOLE IVPB 400 milliGRAM(s) IV Intermittent every 24 hours  Parenteral Nutrition - Adult 1 Each (65 mL/Hr) TPN Continuous <Continuous>  fat emulsion 20% IVPB 20 Gram(s) IV Intermittent once    MEDICATIONS  (PRN):  ondansetron Injectable 4 milliGRAM(s) IV Push every 6 hours PRN Nausea  sodium chloride 0.9% lock flush 10 milliLiter(s) IV Push every 1 hour PRN After each medication administration  sodium chloride 0.9% lock flush 10 milliLiter(s) IV Push every 12 hours PRN Lumen of catheter NOT used    Allergies    sulfa drugs (Unknown)  sulfamethoxazole (Other)    Intolerances        LABS:                        7.4    8.0   )-----------( 251      ( 07 Aug 2017 05:12 )             23.5     08-07    138  |  100  |  25<H>  ----------------------------<  194<H>  4.6   |  26  |  0.80    Ca    8.8      07 Aug 2017 20:54  Phos  3.5     08-07  Mg     2.1     08-07    TPro  5.4<L>  /  Alb  1.5<L>  /  TBili  0.5  /  DBili  x   /  AST  12  /  ALT  7<L>  /  AlkPhos  137<H>        Urinalysis Basic - ( 06 Aug 2017 02:08 )    Color: Yellow / Appearance: Clear / S.010 / pH: x  Gluc: x / Ketone: NEGATIVE  / Bili: NEGATIVE / Urobili: 0.2 E.U./dL   Blood: x / Protein: 30 mg/dL / Nitrite: NEGATIVE   Leuk Esterase: NEGATIVE / RBC: < 5 /HPF / WBC < 5 /HPF   Sq Epi: x / Non Sq Epi: Few /HPF / Bacteria: Present /HPF        RADIOLOGY & ADDITIONAL TESTS:    Culture - Body Fluid with Gram Stain (17 @ 12:19)    -  Gentamicin: S <=4    -  Gentamicin: S <=4    -  Piperacillin/Tazobactam: S <=16    -  Piperacillin/Tazobactam: S <=16    -  Ampicillin: S <=8    -  Ceftriaxone: S <=1    -  Ceftriaxone: S <=1    -  Trimethoprim/Sulfamethoxazole: S <=2/38    -  Trimethoprim/Sulfamethoxazole: S <=2/38    Gram Stain:   Moderate Gram Negative Rods  Numerous White blood cells    -  Ampicillin: R >16    -  Ampicillin/Sulbactam: S <=8/4    -  Ampicillin/Sulbactam: S <=8/4    -  Cefazolin: S <=8    -  Cefazolin: S <=8    -  Ciprofloxacin: S <=1    -  Ciprofloxacin: S <=1    -  Tobramycin: S <=4    -  Tobramycin: S <=4    Specimen Source: .Body Fluid LEFT lower CHECO    Culture Results:   Numerous Escherichia coli  Few Klebsiella oxytoca    Organism Identification: Escherichia coli  Klebsiella oxytoca    Organism: Escherichia coli    Organism: Klebsiella oxytoca    Method Type: ALISHA    Method Type: ALISHA    Fungus Identification (17 @ 23:38)    Culture Results:   Candida albicans  FLUCONAZOLE= Data established for mucosal disease, and  is generally applicable for invasive disease.  Susceptibility is dependent on achieving  the maximal possible blood level.  Doses of 400 MG/day or more may be required in adults with  normal renal function and body habitus.    Culture - Body Fluid with Gram Stain (17 @ 15:25)    Gram Stain:   No organisms seen  No White blood cells    Specimen Source: .Body Fluid Peritoneal Fluid    Culture Results:   No growth    Culture - Blood (17 @ 11:29)    Specimen Source: .Blood Blood-Peripheral    Culture Results:   No growth at 5 days.    Culture - Blood (17 @ 11:29)    Specimen Source: .Blood Blood-Peripheral    Culture Results:   No growth at 5 days.    Culture - Body Fluid with Gram Stain (17 @ 21:10)    -  Cefazolin: S <=8    -  Ciprofloxacin: R >2    -  Erythromycin: I 4    -  Linezolid: S 2    -  Ampicillin: R >8    -  Ampicillin/Sulbactam: S <=8/4    -  Ciprofloxacin: S <=1    -  Penicillin: R >8    -  Tetra/Doxy: S <=4    -  Ampicillin: S <=8    -  Gentamicin: S <=4    -  Piperacillin/Tazobactam: S <=16    -  Tobramycin: S <=4    -  Ceftriaxone: S <=1    -  Trimethoprim/Sulfamethoxazole: S <=2/38    Gram Stain:   Numerous Gram Negative Rods  Few White blood cells    Specimen Source: .Body Fluid abdominal fluid    Culture Results:   Numerous Escherichia coli  Rare Yeast ...... Choclate plate send to core lab for ID and sensitivity  see#[id number]  Rare Lactobacillus species  Rare Enterococcus faecium  More than three types of organisms recovered may indicate colonization  and/or contamination.  Please call Microbiology immediately if identification and/or  suceptibility is indicated.    Organism Identification: Escherichia coli  Enterococcus faecium    Organism: Escherichia coli    Organism: Enterococcus faecium    Method Type: ALISHA    Method Type: ALISHA    Culture - Blood (17 @ 23:16)    Specimen Source: .Blood Blood-Peripheral    Culture Results:   No growth at 5 days.    Culture - Blood (17 @ 23:16)    Specimen Source: .Blood Blood-Peripheral    Culture Results:   No growth at 5 days.

## 2017-08-08 NOTE — PROGRESS NOTE ADULT - SUBJECTIVE AND OBJECTIVE BOX
ON:PVCs, BMP stable   Pt was seen at bedside this AM. Sedated, but arousable and following commands. ROS non-contributory.     Vital Signs Last 24 Hrs  T(C): 37.4 (08 Aug 2017 12:00), Max: 38.9 (07 Aug 2017 18:01)  T(F): 99.4 (08 Aug 2017 12:00), Max: 102 (07 Aug 2017 18:01)  HR: 74 (08 Aug 2017 13:00) (66 - 88)  BP: 101/59 (08 Aug 2017 13:00) (101/59 - 132/65)  BP(mean): 79 (08 Aug 2017 13:00) (70 - 86)  RR: 11 (08 Aug 2017 13:00) (11 - 16)  SpO2: 100% (08 Aug 2017 13:00) (95% - 100%)     Physical Exam:  GEN: NAD, sedated, intubated, arousable to touch and sound  HEENT: PERRL, EOMI, MMM  Lungs: diminished breath sounds in lower lobes b/l, no wheezing, rhonchi, rales   Cardiac: S1 S2 rrr, no murmur, gallop, rubs   Abd: distended, hypoactive bowel sounds, abthera vac draining serosanginous fluid. L CHECO draining serosanginous-bilious mixture, R CHECO draining minimal serosanguinous fluid   : stone in place, BUN/Cr stable, improving   Ext: warm, palpable pulses, 2+ pitting edema UE>LE, cap refill <2 sec  Neuro: intubated, sedated, responds to verbal commands,   Skin: abd erythematous, fungal infxn in intertriginous folds    CAPILLARY BLOOD GLUCOSE  128 (08 Aug 2017 11:18)  163 (08 Aug 2017 07:00)  172 (07 Aug 2017 22:06)  116 (07 Aug 2017 20:00)          08-07 @ 07:01 - 08-08 @ 07:00  --------------------------------------------------------  IN: 3350 mL / OUT: 5470 mL / NET: -2120 mL    08-08 @ 07:01 - 08-08 @ 13:58  --------------------------------------------------------  IN: 800.8 mL / OUT: 1350 mL / NET: -549.2 mL      Mode: AC/ CMV (Assist Control/ Continuous Mandatory Ventilation)  RR (machine): 12  TV (machine): 450  FiO2: 40  PEEP: 10  ITime: 1  MAP: 12  PIP: 23    ondansetron Injectable 4 milliGRAM(s) IV Push every 6 hours PRN  insulin lispro (HumaLOG) corrective regimen sliding scale   SubCutaneous Before meals and at bedtime  dextrose 5%. 1000 milliLiter(s) IV Continuous <Continuous>  dextrose 50% Injectable 25 Gram(s) IV Push once  sodium chloride 0.9% lock flush 10 milliLiter(s) IV Push every 1 hour PRN  sodium chloride 0.9% lock flush 10 milliLiter(s) IV Push every 12 hours PRN  sodium chloride 0.9% lock flush 20 milliLiter(s) IV Push once  cyanocobalamin Injectable 1000 MICROGram(s) SubCutaneous every 7 days  dextrose 50% Injectable 12.5 Gram(s) IV Push once  heparin  Injectable 7500 Unit(s) SubCutaneous every 8 hours  fat emulsion 20% Infusion 50 Gram(s) IV Continuous <Continuous>  propofol Infusion 1.662 MICROgram(s)/kG/Min IV Continuous <Continuous>  piperacillin/tazobactam IVPB. 3.375 Gram(s) IV Intermittent every 6 hours  nystatin Powder 1 Application(s) Topical two times a day  pantoprazole  Injectable 40 milliGRAM(s) IV Push daily  linezolid  IVPB 600 milliGRAM(s) IV Intermittent every 12 hours  fentaNYL   Infusion 0.101 MICROgram(s)/kG/Hr IV Continuous <Continuous>  calcitriol Injectable 1 MICROGram(s) IV Push <User Schedule>  fat emulsion 20% Infusion 20 Gram(s) IV Continuous <Continuous>  Parenteral Nutrition - Adult 1 Each TPN Continuous <Continuous>  fluconAZOLE IVPB 400 milliGRAM(s) IV Intermittent every 24 hours  Parenteral Nutrition - Adult 1 Each TPN Continuous <Continuous>  fat emulsion 20% IVPB 20 Gram(s) IV Intermittent once    LABS:  ABG - ( 07 Aug 2017 09:35 )  pH: 7.47  /  pCO2: 38    /  pO2: 265   / HCO3: 27    / Base Excess: 3.1   /  SaO2: x         CBC Full  -  ( 08 Aug 2017 04:46 )  WBC Count : 12.1 K/uL  Hemoglobin : 7.7 g/dL  Hematocrit : 24.4 %  Platelet Count - Automated : 274 K/uL  Mean Cell Volume : 91.0 fL  Mean Cell Hemoglobin : 28.7 pg  Mean Cell Hemoglobin Concentration : 31.6 g/dL  Auto Neutrophil # : x  Auto Lymphocyte # : x  Auto Monocyte # : x  Auto Eosinophil # : x  Auto Basophil # : x  Auto Neutrophil % : x  Auto Lymphocyte % : x  Auto Monocyte % : x  Auto Eosinophil % : x  Auto Basophil % : x    08-08    138  |  102  |  24<H>  ----------------------------<  122<H>  4.6   |  25  |  0.80    Ca    9.3      08 Aug 2017 04:46  Phos  3.6     08-08  Mg     2.1     08-08    TPro  5.6<L>  /  Alb  2.1<L>  /  TBili  1.1  /  DBili  x   /  AST  11  /  ALT  7<L>  /  AlkPhos  130<H>  08-08

## 2017-08-08 NOTE — PROGRESS NOTE ADULT - ASSESSMENT
57 F s/p  SBR resection, fistula resection, esophagojejunostomy revision w/ post-op course complicated by RTOR for distal anastomosis breakdown, ATN, acute respiratory failure, & septic shock.     Neuro: Propofol gtt, Fentanyl gtt  CV: MAP 65  Resp: AC//40/12/10  GI/FEN: NPO, TPN/lipids, Vit B12, Vit D, Protonix  : Yu for I/O monitoring in critically ill pt; BUN improving  Endo: ISS  ID: anastamotic leak: fluconazole (8/7--), zosyn (8/1--), linezolid (7/25-8/1; 8/2-8/10)  ///dc: Perioperative Gent/Vanc, colistin (7/29-8/1), vanc (8/1-8/2), micafungin (7/29-8/7)  Heme/PPX: SCDs, SQH  Lines: Radha (7/24-8/8), L basilic vein PICC (7/25-8/8), L IJ TLC (7/30-)  Drains: RLQ mediastinal CHECO to bulb suction, LLQ CHECO to bulb suction. Wound vac to midline  Wounds: Midline xiphoid to pubis incision     Sindhu SU 57 F s/p  SBR resection, fistula resection, esophagojejunostomy revision w/ post-op course complicated by RTOR for distal anastomosis breakdown, ATN, acute respiratory failure, & septic shock.     Neuro: Propofol gtt, Fentanyl gtt  CV: MAP 65, remains off pressors, continue to diurese.  Resp: AC//40/12/10  GI/FEN: NPO, TPN/lipids, Vit B12, Vit D, Protonix  : Yu for I/O monitoring in critically ill pt; BUN improving  Endo: ISS  ID: anastamotic leak: fluconazole for C. albicans (8/7--), zosyn (8/1--), linezolid (7/25-8/1; 8/2-8/10)  ///dc: Perioperative Gent/Vanc, colistin (7/29-8/1), vanc (8/1-8/2), micafungin (7/29-8/7)  Heme/PPX: SCDs, SQH  Lines: Radha (7/24-8/8), L basilic vein PICC (7/25-8/8), L IJ TLC (7/30-)  Drains: RLQ mediastinal CHECO to bulb suction, LLQ CHECO to bulb suction. Wound vac to midline  Wounds: Midline xiphoid to pubis incision

## 2017-08-09 LAB
ALBUMIN SERPL ELPH-MCNC: 2.1 G/DL — LOW (ref 3.3–5)
ALP SERPL-CCNC: 128 U/L — HIGH (ref 40–120)
ALT FLD-CCNC: 7 U/L — LOW (ref 10–45)
ANION GAP SERPL CALC-SCNC: 11 MMOL/L — SIGNIFICANT CHANGE UP (ref 5–17)
AST SERPL-CCNC: 11 U/L — SIGNIFICANT CHANGE UP (ref 10–40)
BILIRUB SERPL-MCNC: 0.7 MG/DL — SIGNIFICANT CHANGE UP (ref 0.2–1.2)
BUN SERPL-MCNC: 25 MG/DL — HIGH (ref 7–23)
CALCIUM SERPL-MCNC: 9 MG/DL — SIGNIFICANT CHANGE UP (ref 8.4–10.5)
CHLORIDE SERPL-SCNC: 98 MMOL/L — SIGNIFICANT CHANGE UP (ref 96–108)
CO2 SERPL-SCNC: 29 MMOL/L — SIGNIFICANT CHANGE UP (ref 22–31)
CREAT SERPL-MCNC: 0.8 MG/DL — SIGNIFICANT CHANGE UP (ref 0.5–1.3)
EOSINOPHIL NFR BLD AUTO: 3 % — SIGNIFICANT CHANGE UP (ref 0–6)
GLUCOSE SERPL-MCNC: 169 MG/DL — HIGH (ref 70–99)
HCT VFR BLD CALC: 23.4 % — LOW (ref 34.5–45)
HGB BLD-MCNC: 7.4 G/DL — LOW (ref 11.5–15.5)
LYMPHOCYTES # BLD AUTO: 19 % — SIGNIFICANT CHANGE UP (ref 13–44)
MAGNESIUM SERPL-MCNC: 2 MG/DL — SIGNIFICANT CHANGE UP (ref 1.6–2.6)
MCHC RBC-ENTMCNC: 29.6 PG — SIGNIFICANT CHANGE UP (ref 27–34)
MCHC RBC-ENTMCNC: 31.6 G/DL — LOW (ref 32–36)
MCV RBC AUTO: 93.6 FL — SIGNIFICANT CHANGE UP (ref 80–100)
MONOCYTES NFR BLD AUTO: 2 % — SIGNIFICANT CHANGE UP (ref 2–14)
NEUTROPHILS NFR BLD AUTO: 56 % — SIGNIFICANT CHANGE UP (ref 43–77)
PHOSPHATE SERPL-MCNC: 4.3 MG/DL — SIGNIFICANT CHANGE UP (ref 2.5–4.5)
PLATELET # BLD AUTO: 303 K/UL — SIGNIFICANT CHANGE UP (ref 150–400)
POTASSIUM SERPL-MCNC: 4.1 MMOL/L — SIGNIFICANT CHANGE UP (ref 3.5–5.3)
POTASSIUM SERPL-SCNC: 4.1 MMOL/L — SIGNIFICANT CHANGE UP (ref 3.5–5.3)
PROT SERPL-MCNC: 5.7 G/DL — LOW (ref 6–8.3)
RBC # BLD: 2.5 M/UL — LOW (ref 3.8–5.2)
RBC # FLD: 15.2 % — SIGNIFICANT CHANGE UP (ref 10.3–16.9)
SODIUM SERPL-SCNC: 138 MMOL/L — SIGNIFICANT CHANGE UP (ref 135–145)
WBC # BLD: 12.3 K/UL — HIGH (ref 3.8–10.5)
WBC # FLD AUTO: 12.3 K/UL — HIGH (ref 3.8–10.5)

## 2017-08-09 PROCEDURE — 99233 SBSQ HOSP IP/OBS HIGH 50: CPT | Mod: GC

## 2017-08-09 PROCEDURE — 71010: CPT | Mod: 26

## 2017-08-09 RX ORDER — I.V. FAT EMULSION 20 G/100ML
20 EMULSION INTRAVENOUS
Qty: 0 | Refills: 0 | Status: DISCONTINUED | OUTPATIENT
Start: 2017-08-09 | End: 2017-08-09

## 2017-08-09 RX ORDER — FUROSEMIDE 40 MG
20 TABLET ORAL ONCE
Qty: 0 | Refills: 0 | Status: COMPLETED | OUTPATIENT
Start: 2017-08-09 | End: 2017-08-09

## 2017-08-09 RX ORDER — ELECTROLYTE SOLUTION,INJ
1 VIAL (ML) INTRAVENOUS
Qty: 0 | Refills: 0 | Status: DISCONTINUED | OUTPATIENT
Start: 2017-08-09 | End: 2017-08-09

## 2017-08-09 RX ADMIN — PIPERACILLIN AND TAZOBACTAM 200 GRAM(S): 4; .5 INJECTION, POWDER, LYOPHILIZED, FOR SOLUTION INTRAVENOUS at 05:43

## 2017-08-09 RX ADMIN — NYSTATIN CREAM 1 APPLICATION(S): 100000 CREAM TOPICAL at 05:59

## 2017-08-09 RX ADMIN — PIPERACILLIN AND TAZOBACTAM 200 GRAM(S): 4; .5 INJECTION, POWDER, LYOPHILIZED, FOR SOLUTION INTRAVENOUS at 23:37

## 2017-08-09 RX ADMIN — Medication 20 MILLIGRAM(S): at 01:50

## 2017-08-09 RX ADMIN — LINEZOLID 300 MILLIGRAM(S): 600 INJECTION, SOLUTION INTRAVENOUS at 05:44

## 2017-08-09 RX ADMIN — Medication 20 MILLIGRAM(S): at 12:42

## 2017-08-09 RX ADMIN — PROPOFOL 1 MICROGRAM(S)/KG/MIN: 10 INJECTION, EMULSION INTRAVENOUS at 00:37

## 2017-08-09 RX ADMIN — HEPARIN SODIUM 7500 UNIT(S): 5000 INJECTION INTRAVENOUS; SUBCUTANEOUS at 21:30

## 2017-08-09 RX ADMIN — PIPERACILLIN AND TAZOBACTAM 200 GRAM(S): 4; .5 INJECTION, POWDER, LYOPHILIZED, FOR SOLUTION INTRAVENOUS at 11:06

## 2017-08-09 RX ADMIN — LINEZOLID 300 MILLIGRAM(S): 600 INJECTION, SOLUTION INTRAVENOUS at 18:58

## 2017-08-09 RX ADMIN — CALCITRIOL 1 MICROGRAM(S): 0.5 CAPSULE ORAL at 11:04

## 2017-08-09 RX ADMIN — PANTOPRAZOLE SODIUM 40 MILLIGRAM(S): 20 TABLET, DELAYED RELEASE ORAL at 11:04

## 2017-08-09 RX ADMIN — FLUCONAZOLE 100 MILLIGRAM(S): 150 TABLET ORAL at 11:06

## 2017-08-09 RX ADMIN — HEPARIN SODIUM 7500 UNIT(S): 5000 INJECTION INTRAVENOUS; SUBCUTANEOUS at 05:44

## 2017-08-09 RX ADMIN — HEPARIN SODIUM 7500 UNIT(S): 5000 INJECTION INTRAVENOUS; SUBCUTANEOUS at 15:13

## 2017-08-09 RX ADMIN — PIPERACILLIN AND TAZOBACTAM 200 GRAM(S): 4; .5 INJECTION, POWDER, LYOPHILIZED, FOR SOLUTION INTRAVENOUS at 18:55

## 2017-08-09 RX ADMIN — NYSTATIN CREAM 1 APPLICATION(S): 100000 CREAM TOPICAL at 19:00

## 2017-08-09 RX ADMIN — I.V. FAT EMULSION 8.33 GRAM(S): 20 EMULSION INTRAVENOUS at 21:24

## 2017-08-09 RX ADMIN — PROPOFOL 1 MICROGRAM(S)/KG/MIN: 10 INJECTION, EMULSION INTRAVENOUS at 15:14

## 2017-08-09 RX ADMIN — PREGABALIN 1000 MICROGRAM(S): 225 CAPSULE ORAL at 11:04

## 2017-08-09 RX ADMIN — Medication 1 EACH: at 18:59

## 2017-08-09 NOTE — CHART NOTE - NSCHARTNOTEFT_GEN_A_CORE
Admitting Diagnosis:   57 F s/p  SBR resection, fistula resection, esophagojejunostomy revision w/ post-op course complicated by RTOR for distal anastomosis breakdown, ATN, acute respiratory failure, & septic shock.     PAST MEDICAL & SURGICAL HISTORY:  Reflux  Obesity  DVT (deep venous thrombosis):  neck  Diverticulitis  Hypertension  Elective surgery: abodominal wall surgery  dec 2016  Elective surgery: 2016 gastric bypass revision  Gastric bypass status for obesity: gastric sleeve, 2012  S/P breast biopsy: , left  S/P colon resection:   S/P knee surgery: repair ,   right; left  Umbilical hernia:   S/P appendectomy:   S/P tonsillectomy:     Current Nutrition Order: TPN (central line): 265 g D, 104 g AA, 20 g lipids, 1635 mL fluid. Regimen providin kcal, 104 g protein, & 1635 mL fluid. Propofol running at 4 mL (providing 106 kcal from fat).      GI Issues: WDL    Pain: EMANUEL 2/2 vent.     Skin Integrity: Suspected DTI    Labs:       138  |  98  |  25<H>  ----------------------------<  169<H>  4.1   |  29  |  0.80    Ca    9.0      09 Aug 2017 03:56  Phos  4.3       Mg     2.0         TPro  5.7<L>  /  Alb  2.1<L>  /  TBili  0.7  /  DBili  x   /  AST  11  /  ALT  7<L>  /  AlkPhos  128<H>      CAPILLARY BLOOD GLUCOSE  136 (08 Aug 2017 16:00)    Medications:  MEDICATIONS  (STANDING):  insulin lispro (HumaLOG) corrective regimen sliding scale   SubCutaneous Before meals and at bedtime  dextrose 5%. 1000 milliLiter(s) (50 mL/Hr) IV Continuous <Continuous>  dextrose 50% Injectable 25 Gram(s) IV Push once  sodium chloride 0.9% lock flush 20 milliLiter(s) IV Push once  cyanocobalamin Injectable 1000 MICROGram(s) SubCutaneous every 7 days  dextrose 50% Injectable 12.5 Gram(s) IV Push once  heparin  Injectable 7500 Unit(s) SubCutaneous every 8 hours  fat emulsion 20% Infusion 50 Gram(s) (20.8 mL/Hr) IV Continuous <Continuous>  propofol Infusion 1.662 MICROgram(s)/kG/Min (1 mL/Hr) IV Continuous <Continuous>  piperacillin/tazobactam IVPB. 3.375 Gram(s) IV Intermittent every 6 hours  nystatin Powder 1 Application(s) Topical two times a day  pantoprazole  Injectable 40 milliGRAM(s) IV Push daily  linezolid  IVPB 600 milliGRAM(s) IV Intermittent every 12 hours  fentaNYL   Infusion 0.101 MICROgram(s)/kG/Hr (1 mL/Hr) IV Continuous <Continuous>  calcitriol Injectable 1 MICROGram(s) IV Push <User Schedule>  fluconAZOLE IVPB 400 milliGRAM(s) IV Intermittent every 24 hours  Parenteral Nutrition - Adult 1 Each (65 mL/Hr) TPN Continuous <Continuous>  Parenteral Nutrition - Adult 1 Each (64 mL/Hr) TPN Continuous <Continuous>  fat emulsion 20% Infusion 20 Gram(s) (8.33 mL/Hr) IV Continuous <Continuous>    MEDICATIONS  (PRN):  ondansetron Injectable 4 milliGRAM(s) IV Push every 6 hours PRN Nausea  sodium chloride 0.9% lock flush 10 milliLiter(s) IV Push every 1 hour PRN After each medication administration  sodium chloride 0.9% lock flush 10 milliLiter(s) IV Push every 12 hours PRN Lumen of catheter NOT used      Weight:  Last wt check  100 kg    Weight Change: Indicates 15 kg wt gain since admission. Question accuracy of weights as amount of wt gain unlikely.     Estimated energy needs using 52 kg IBW:  25-30 kcal/kg (5223-6083 kcal).   1.8-2 g/kg ( g protein).  30-35 mL/kg (2603-1053 mL fluid).    Subjective: N/A vent    Previous Nutrition Diagnosis: Increased nutrient needs RT fistula & S/p surgery AEB 1.8-2 g/kg protein.     Active [  X ]  Resolved [   ]    Goal: Continue to meet % of needs via tolerated route.     Recommendations: Continue TPN as ordered.     Education: N/A this visit 2/2 vent.     Risk Level: High [ X  ] Moderate [   ] Low [   ]

## 2017-08-09 NOTE — ADVANCED PRACTICE NURSE CONSULT - RECOMMEDATIONS
Recommend moisture barrier ointment to site, continue repositioning with wedges. Spoke with ROM Ziegler and house staff.

## 2017-08-09 NOTE — ADVANCED PRACTICE NURSE CONSULT - ASSESSMENT
57 F s/p  SBR resection, fistula resection, esophagojejunostomy revision w/ post-op course complicated by RTOR for distal anastomosis breakdown, ATN, acute respiratory failure, & septic shock. Pt with evidence of previous pressure ulcers to bilat buttocks, now with evolving DTI/Stage 3 ulcer to sacrum, approx 4x3 cm, subq tissue visible.

## 2017-08-09 NOTE — PROGRESS NOTE ADULT - SUBJECTIVE AND OBJECTIVE BOX
SICU DAILY PROGRESS NOTE    INTERVAL HPI / OVERNIGHT EVENTS:   8/9: Given Lasix 20 at 11am w/ good response; UOP >2400 for day shift. Edema much improved & SBP 90s thus will stop diuresis for now.   O/N: given lasix 20 at 2am.   8/8: continuing diuresis- Lasix 20 in AM w/ good response; stopped Albumin 25%; wean sedation; a-line removed; PICC removed. Per Paul (ID), Zyvox to stop Thursday. DTI & stage 2 pressure ulcer noted.     MEDICATIONS  (STANDING):  insulin lispro (HumaLOG) corrective regimen sliding scale   SubCutaneous Before meals and at bedtime  dextrose 5%. 1000 milliLiter(s) (50 mL/Hr) IV Continuous <Continuous>  dextrose 50% Injectable 25 Gram(s) IV Push once  sodium chloride 0.9% lock flush 20 milliLiter(s) IV Push once  cyanocobalamin Injectable 1000 MICROGram(s) SubCutaneous every 7 days  dextrose 50% Injectable 12.5 Gram(s) IV Push once  heparin  Injectable 7500 Unit(s) SubCutaneous every 8 hours  fat emulsion 20% Infusion 50 Gram(s) (20.8 mL/Hr) IV Continuous <Continuous>  propofol Infusion 1.662 MICROgram(s)/kG/Min (1 mL/Hr) IV Continuous <Continuous>  piperacillin/tazobactam IVPB. 3.375 Gram(s) IV Intermittent every 6 hours  nystatin Powder 1 Application(s) Topical two times a day  pantoprazole  Injectable 40 milliGRAM(s) IV Push daily  linezolid  IVPB 600 milliGRAM(s) IV Intermittent every 12 hours  fentaNYL   Infusion 0.101 MICROgram(s)/kG/Hr (1 mL/Hr) IV Continuous <Continuous>  calcitriol Injectable 1 MICROGram(s) IV Push <User Schedule>  fluconAZOLE IVPB 400 milliGRAM(s) IV Intermittent every 24 hours  Parenteral Nutrition - Adult 1 Each (65 mL/Hr) TPN Continuous <Continuous>  Parenteral Nutrition - Adult 1 Each (64 mL/Hr) TPN Continuous <Continuous>  fat emulsion 20% Infusion 20 Gram(s) (8.33 mL/Hr) IV Continuous <Continuous>    MEDICATIONS  (PRN):  ondansetron Injectable 4 milliGRAM(s) IV Push every 6 hours PRN Nausea  sodium chloride 0.9% lock flush 10 milliLiter(s) IV Push every 1 hour PRN After each medication administration  sodium chloride 0.9% lock flush 10 milliLiter(s) IV Push every 12 hours PRN Lumen of catheter NOT used    CENTRAL LINE: YES - ASHELY, 7/30             Remove: NO; will replace as soon as HCP can be reached for consent           Indication: TPN    LI: YES             Remove: NO           Indication: UOP monitoring in critically ill patient    A-LINE: NO     Vital Signs Last 24 Hrs  T(C): 37.6 (09 Aug 2017 16:00), Max: 37.7 (09 Aug 2017 05:33)  T(F): 99.6 (09 Aug 2017 16:00), Max: 99.9 (09 Aug 2017 05:33)  HR: 77 (09 Aug 2017 17:10) (70 - 83)  BP: 92/60 (09 Aug 2017 17:00) (82/52 - 135/66)  BP(mean): 71 (09 Aug 2017 17:00) (61 - 88)  RR: 12 (09 Aug 2017 17:10) (11 - 23)  SpO2: 99% (09 Aug 2017 17:10) (95% - 100%)    Mode: AC/ CMV (Assist Control/ Continuous Mandatory Ventilation)  RR (machine): 12  TV (machine): 450  FiO2: 40  PEEP: 10  ITime: 1  MAP: 13  PIP: 25    I&O's Detail    08 Aug 2017 07:01  -  09 Aug 2017 07:00  --------------------------------------------------------  IN:    Fat Emulsion 20%: 91.3 mL    fentaNYL  Infusion: 226 mL    IV PiggyBack: 800 mL    propofol Infusion: 84.5 mL    TPN (Total Parenteral Nutrition): 1592 mL  Total IN: 2793.8 mL  OUT:    Bulb: 185 mL    Bulb: 35 mL    Indwelling Catheter - Urethral: 4655 mL    VAC (Vacuum Assisted Closure) System: 325 mL  Total OUT: 5200 mL  Total NET: -2406.2 mL    09 Aug 2017 07:01  -  09 Aug 2017 17:38  --------------------------------------------------------  IN:    Fat Emulsion 20%: 24.9 mL    fentaNYL  Infusion: 85 mL    IV PiggyBack: 300 mL    propofol Infusion: 44 mL    TPN (Total Parenteral Nutrition): 715 mL  Total IN: 1168.9 mL  OUT:    Bulb: 70 mL    Indwelling Catheter - Urethral: 2450 mL    VAC (Vacuum Assisted Closure) System: 150 mL  Total OUT: 2670 mL  Total NET: -1501.1 mL    PHYSICAL EXAM:  NEURO: Intubated, alert, interactive, asking appropriate questions by writing, NAD  HEENT: PERRL, MMM  CV: RRR  PULM: Intubated, breath sounds improved from prior exams  ABD: Softly distended. LLQ CHECO drain- SS/bilious. RLQ mediastinal drain- SS. Midline abthera vac w/ good seal.  : Li- clear yellow urine.   EXTR: WWP. Edematous but improving  Vasc: 2+ radial, 1+ DP pulses  MSK: no joint swelling.   SKIN: Fungal-appearing rash beneath panniculus.     LABS:                        7.4    12.3  )-----------( 303      ( 09 Aug 2017 03:56 )             23.4     08-09    138  |  98  |  25<H>  ----------------------------<  169<H>  4.1   |  29  |  0.80    Ca    9.0      09 Aug 2017 03:56  Phos  4.3     08-09  Mg     2.0     08-09    TPro  5.7<L>  /  Alb  2.1<L>  /  TBili  0.7  /  DBili  x   /  AST  11  /  ALT  7<L>  /  AlkPhos  128<H>  08-09 SICU DAILY PROGRESS NOTE    INTERVAL HPI / OVERNIGHT EVENTS:   8/9: Given Lasix 20 at 11am w/ good response; UOP >2400 for day shift. Edema much improved & SBP 90s thus will stop diuresis for now.   O/N: given lasix 20 at 2am.   8/8: continuing diuresis- Lasix 20 in AM w/ good response; stopped Albumin 25%; wean sedation; a-line removed; PICC removed. Per Paul (ID), Zyvox to stop Thursday. DTI & stage 2 pressure ulcer noted.     MEDICATIONS  (STANDING):  insulin lispro (HumaLOG) corrective regimen sliding scale   SubCutaneous Before meals and at bedtime  dextrose 5%. 1000 milliLiter(s) (50 mL/Hr) IV Continuous <Continuous>  dextrose 50% Injectable 25 Gram(s) IV Push once  sodium chloride 0.9% lock flush 20 milliLiter(s) IV Push once  cyanocobalamin Injectable 1000 MICROGram(s) SubCutaneous every 7 days  dextrose 50% Injectable 12.5 Gram(s) IV Push once  heparin  Injectable 7500 Unit(s) SubCutaneous every 8 hours  fat emulsion 20% Infusion 50 Gram(s) (20.8 mL/Hr) IV Continuous <Continuous>  propofol Infusion 1.662 MICROgram(s)/kG/Min (1 mL/Hr) IV Continuous <Continuous>  piperacillin/tazobactam IVPB. 3.375 Gram(s) IV Intermittent every 6 hours  nystatin Powder 1 Application(s) Topical two times a day  pantoprazole  Injectable 40 milliGRAM(s) IV Push daily  linezolid  IVPB 600 milliGRAM(s) IV Intermittent every 12 hours  fentaNYL   Infusion 0.101 MICROgram(s)/kG/Hr (1 mL/Hr) IV Continuous <Continuous>  calcitriol Injectable 1 MICROGram(s) IV Push <User Schedule>  fluconAZOLE IVPB 400 milliGRAM(s) IV Intermittent every 24 hours  Parenteral Nutrition - Adult 1 Each (65 mL/Hr) TPN Continuous <Continuous>  Parenteral Nutrition - Adult 1 Each (64 mL/Hr) TPN Continuous <Continuous>  fat emulsion 20% Infusion 20 Gram(s) (8.33 mL/Hr) IV Continuous <Continuous>    MEDICATIONS  (PRN):  ondansetron Injectable 4 milliGRAM(s) IV Push every 6 hours PRN Nausea  sodium chloride 0.9% lock flush 10 milliLiter(s) IV Push every 1 hour PRN After each medication administration  sodium chloride 0.9% lock flush 10 milliLiter(s) IV Push every 12 hours PRN Lumen of catheter NOT used    CENTRAL LINE: YES - ASHELY, 7/30             Remove: NO; will replace as soon as HCP can be reached for consent           Indication: TPN    LI: YES             Remove: NO           Indication: UOP monitoring in critically ill patient    A-LINE: NO     Vital Signs Last 24 Hrs  T(C): 37.6 (09 Aug 2017 16:00), Max: 37.7 (09 Aug 2017 05:33)  T(F): 99.6 (09 Aug 2017 16:00), Max: 99.9 (09 Aug 2017 05:33)  HR: 77 (09 Aug 2017 17:10) (70 - 83)  BP: 92/60 (09 Aug 2017 17:00) (82/52 - 135/66)  BP(mean): 71 (09 Aug 2017 17:00) (61 - 88)  RR: 12 (09 Aug 2017 17:10) (11 - 23)  SpO2: 99% (09 Aug 2017 17:10) (95% - 100%)    Mode: AC/ CMV (Assist Control/ Continuous Mandatory Ventilation)  RR (machine): 12  TV (machine): 450  FiO2: 40  PEEP: 10  ITime: 1  MAP: 13  PIP: 25    I&O's Detail    08 Aug 2017 07:01  -  09 Aug 2017 07:00  --------------------------------------------------------  IN:    Fat Emulsion 20%: 91.3 mL    fentaNYL  Infusion: 226 mL    IV PiggyBack: 800 mL    propofol Infusion: 84.5 mL    TPN (Total Parenteral Nutrition): 1592 mL  Total IN: 2793.8 mL  OUT:    Bulb: 185 mL    Bulb: 35 mL    Indwelling Catheter - Urethral: 4655 mL    VAC (Vacuum Assisted Closure) System: 325 mL  Total OUT: 5200 mL  Total NET: -2406.2 mL    09 Aug 2017 07:01  -  09 Aug 2017 17:38  --------------------------------------------------------  IN:    Fat Emulsion 20%: 24.9 mL    fentaNYL  Infusion: 85 mL    IV PiggyBack: 300 mL    propofol Infusion: 44 mL    TPN (Total Parenteral Nutrition): 715 mL  Total IN: 1168.9 mL  OUT:    Bulb: 70 mL    Indwelling Catheter - Urethral: 2450 mL    VAC (Vacuum Assisted Closure) System: 150 mL  Total OUT: 2670 mL  Total NET: -1501.1 mL    PHYSICAL EXAM:  NEURO: Intubated, alert, interactive, asking appropriate questions by writing, NAD  HEENT: PERRL, MMM  CV: RRR  PULM: Intubated, breath sounds improved from prior exams  ABD: Softly distended. LLQ CHECO drain- SS/bilious. RLQ mediastinal drain- SS. Midline abthera vac w/ good seal.  : Li- clear yellow urine.   EXTR: WWP. Edematous but improving  Vasc: 2+ radial, 1+ DP pulses  MSK: no joint swelling.   SKIN: Fungal-appearing rash beneath panniculus stable.     LABS:                        7.4    12.3  )-----------( 303      ( 09 Aug 2017 03:56 )             23.4     08-09    138  |  98  |  25<H>  ----------------------------<  169<H>  4.1   |  29  |  0.80    Ca    9.0      09 Aug 2017 03:56  Phos  4.3     08-09  Mg     2.0     08-09    TPro  5.7<L>  /  Alb  2.1<L>  /  TBili  0.7  /  DBili  x   /  AST  11  /  ALT  7<L>  /  AlkPhos  128<H>  08-09

## 2017-08-09 NOTE — PROGRESS NOTE ADULT - ASSESSMENT
57 F s/p  SBR resection, fistula resection, esophagojejunostomy revision w/ post-op course complicated by RTOR for distal anastomosis breakdown, ATN, acute respiratory failure, & septic shock.     Neuro: Propofol gtt, Fentanyl gtt  CV: MAP 65  Resp: AC//40/12/10  GI/FEN: NPO, TPN/lipids, Vit B12, Vit D, Protonix  : Yu for I/O monitoring in critically ill pt; BUN stable  Endo: ISS  ID: anastamotic leak: fluconazole (8/7--), zosyn (8/1--), linezolid (7/25-8/1; 8/2-8/10)  ///dc: Perioperative Gent/Vanc, colistin (7/29-8/1), vanc (8/1-8/2), micafungin (7/29-8/7)  Heme/PPX: SCDs, SQH  Lines: Radha (7/24-8/8), L basilic vein PICC (7/25-8/8), L IJ TLC (7/30-)  Drains: RLQ mediastinal CHECO to bulb suction, LLQ CHECO to bulb suction. Wound vac to midline  Wounds: Midline xiphoid to pubis incision; DTI & stage 2 pressure ulcer noted. 57 F s/p  SBR resection, fistula resection, esophagojejunostomy revision w/ post-op course complicated by RTOR for distal anastomosis breakdown, ATN, acute respiratory failure, & septic shock.     Neuro: Propofol gtt, Fentanyl gtt  CV: MAP 65. Tolerating diuresis but BP slightly lower. With improvement in edema will hold afternoon/evening lasix  Resp: AC//40/12/10  GI/FEN: NPO, TPN/lipids, Vit B12, Vit D, Protonix  : Yu for I/O monitoring in critically ill pt; BUN stable  Endo: ISS  ID: anastamotic leak: fluconazole (8/7--), zosyn (8/1--), linezolid (7/25-8/1; 8/2-8/10)  ///dc: Perioperative Gent/Vanc, colistin (7/29-8/1), vanc (8/1-8/2), micafungin (7/29-8/7)  Heme/PPX: SCDs, SQH  Lines: Radha (7/24-8/8), L basilic vein PICC (7/25-8/8), L IJ TLC (7/30-)  Drains: RLQ mediastinal CHECO to bulb suction, LLQ CHECO to bulb suction. Wound vac to midline  Wounds: Midline xiphoid to pubis incision; DTI & stage 2 pressure ulcer noted.

## 2017-08-10 LAB
ALBUMIN SERPL ELPH-MCNC: 2 G/DL — LOW (ref 3.3–5)
ALBUMIN SERPL ELPH-MCNC: 2.1 G/DL — LOW (ref 3.3–5)
ALP SERPL-CCNC: 155 U/L — HIGH (ref 40–120)
ALP SERPL-CCNC: 181 U/L — HIGH (ref 40–120)
ALT FLD-CCNC: 10 U/L — SIGNIFICANT CHANGE UP (ref 10–45)
ALT FLD-CCNC: 9 U/L — LOW (ref 10–45)
ANION GAP SERPL CALC-SCNC: 13 MMOL/L — SIGNIFICANT CHANGE UP (ref 5–17)
ANION GAP SERPL CALC-SCNC: 9 MMOL/L — SIGNIFICANT CHANGE UP (ref 5–17)
APTT BLD: 30.9 SEC — SIGNIFICANT CHANGE UP (ref 27.5–37.4)
AST SERPL-CCNC: 13 U/L — SIGNIFICANT CHANGE UP (ref 10–40)
AST SERPL-CCNC: 16 U/L — SIGNIFICANT CHANGE UP (ref 10–40)
BASE EXCESS BLDA CALC-SCNC: 4.8 MMOL/L — HIGH (ref -2–3)
BASE EXCESS BLDA CALC-SCNC: 5.2 MMOL/L — HIGH (ref -2–3)
BASOPHILS NFR BLD AUTO: 1 % — SIGNIFICANT CHANGE UP (ref 0–2)
BASOPHILS NFR BLD AUTO: 2 % — SIGNIFICANT CHANGE UP (ref 0–2)
BILIRUB SERPL-MCNC: 0.6 MG/DL — SIGNIFICANT CHANGE UP (ref 0.2–1.2)
BILIRUB SERPL-MCNC: 0.9 MG/DL — SIGNIFICANT CHANGE UP (ref 0.2–1.2)
BLD GP AB SCN SERPL QL: NEGATIVE — SIGNIFICANT CHANGE UP
BUN SERPL-MCNC: 25 MG/DL — HIGH (ref 7–23)
BUN SERPL-MCNC: 26 MG/DL — HIGH (ref 7–23)
CA-I BLDA-SCNC: 1.11 MMOL/L — LOW (ref 1.12–1.3)
CALCIUM SERPL-MCNC: 8.8 MG/DL — SIGNIFICANT CHANGE UP (ref 8.4–10.5)
CALCIUM SERPL-MCNC: 9.2 MG/DL — SIGNIFICANT CHANGE UP (ref 8.4–10.5)
CHLORIDE SERPL-SCNC: 100 MMOL/L — SIGNIFICANT CHANGE UP (ref 96–108)
CHLORIDE SERPL-SCNC: 102 MMOL/L — SIGNIFICANT CHANGE UP (ref 96–108)
CO2 SERPL-SCNC: 27 MMOL/L — SIGNIFICANT CHANGE UP (ref 22–31)
CO2 SERPL-SCNC: 30 MMOL/L — SIGNIFICANT CHANGE UP (ref 22–31)
COHGB MFR BLDA: 0.3 % — SIGNIFICANT CHANGE UP
CREAT SERPL-MCNC: 0.8 MG/DL — SIGNIFICANT CHANGE UP (ref 0.5–1.3)
CREAT SERPL-MCNC: 0.8 MG/DL — SIGNIFICANT CHANGE UP (ref 0.5–1.3)
EOSINOPHIL NFR BLD AUTO: 1 % — SIGNIFICANT CHANGE UP (ref 0–6)
EOSINOPHIL NFR BLD AUTO: 2.1 % — SIGNIFICANT CHANGE UP (ref 0–6)
GAS PNL BLDA: SIGNIFICANT CHANGE UP
GAS PNL BLDA: SIGNIFICANT CHANGE UP
GLUCOSE SERPL-MCNC: 116 MG/DL — HIGH (ref 70–99)
GLUCOSE SERPL-MCNC: 187 MG/DL — HIGH (ref 70–99)
HCO3 BLDA-SCNC: 29 MMOL/L — HIGH (ref 21–28)
HCO3 BLDA-SCNC: 29 MMOL/L — HIGH (ref 21–28)
HCT VFR BLD CALC: 23.1 % — LOW (ref 34.5–45)
HCT VFR BLD CALC: 25.8 % — LOW (ref 34.5–45)
HGB BLD-MCNC: 7.2 G/DL — LOW (ref 11.5–15.5)
HGB BLD-MCNC: 8 G/DL — LOW (ref 11.5–15.5)
HGB BLDA-MCNC: 8.2 G/DL — LOW (ref 11.5–15.5)
INR BLD: 1.18 — HIGH (ref 0.88–1.16)
LYMPHOCYTES # BLD AUTO: 14 % — SIGNIFICANT CHANGE UP (ref 13–44)
LYMPHOCYTES # BLD AUTO: 18.6 % — SIGNIFICANT CHANGE UP (ref 13–44)
MAGNESIUM SERPL-MCNC: 2 MG/DL — SIGNIFICANT CHANGE UP (ref 1.6–2.6)
MAGNESIUM SERPL-MCNC: 2.2 MG/DL — SIGNIFICANT CHANGE UP (ref 1.6–2.6)
MCHC RBC-ENTMCNC: 28.5 PG — SIGNIFICANT CHANGE UP (ref 27–34)
MCHC RBC-ENTMCNC: 29.3 PG — SIGNIFICANT CHANGE UP (ref 27–34)
MCHC RBC-ENTMCNC: 31 G/DL — LOW (ref 32–36)
MCHC RBC-ENTMCNC: 31.2 G/DL — LOW (ref 32–36)
MCV RBC AUTO: 91.8 FL — SIGNIFICANT CHANGE UP (ref 80–100)
MCV RBC AUTO: 93.9 FL — SIGNIFICANT CHANGE UP (ref 80–100)
METHGB MFR BLDA: 0.6 % — SIGNIFICANT CHANGE UP
MONOCYTES NFR BLD AUTO: 5 % — SIGNIFICANT CHANGE UP (ref 2–14)
MONOCYTES NFR BLD AUTO: 6.3 % — SIGNIFICANT CHANGE UP (ref 2–14)
NEUTROPHILS NFR BLD AUTO: 52 % — SIGNIFICANT CHANGE UP (ref 43–77)
NEUTROPHILS NFR BLD AUTO: 71 % — SIGNIFICANT CHANGE UP (ref 43–77)
OXYHGB MFR BLDA: 98 % — SIGNIFICANT CHANGE UP (ref 94–100)
PCO2 BLDA: 39 MMHG — SIGNIFICANT CHANGE UP (ref 32–45)
PCO2 BLDA: 44 MMHG — SIGNIFICANT CHANGE UP (ref 32–45)
PH BLDA: 7.44 — SIGNIFICANT CHANGE UP (ref 7.35–7.45)
PH BLDA: 7.49 — HIGH (ref 7.35–7.45)
PHOSPHATE SERPL-MCNC: 4.6 MG/DL — HIGH (ref 2.5–4.5)
PHOSPHATE SERPL-MCNC: 4.6 MG/DL — HIGH (ref 2.5–4.5)
PLATELET # BLD AUTO: 325 K/UL — SIGNIFICANT CHANGE UP (ref 150–400)
PLATELET # BLD AUTO: 350 K/UL — SIGNIFICANT CHANGE UP (ref 150–400)
PO2 BLDA: 106 MMHG — SIGNIFICANT CHANGE UP (ref 83–108)
PO2 BLDA: 132 MMHG — HIGH (ref 83–108)
POTASSIUM BLDA-SCNC: 3.7 MMOL/L — SIGNIFICANT CHANGE UP (ref 3.5–4.9)
POTASSIUM SERPL-MCNC: 3.9 MMOL/L — SIGNIFICANT CHANGE UP (ref 3.5–5.3)
POTASSIUM SERPL-MCNC: 4 MMOL/L — SIGNIFICANT CHANGE UP (ref 3.5–5.3)
POTASSIUM SERPL-SCNC: 3.9 MMOL/L — SIGNIFICANT CHANGE UP (ref 3.5–5.3)
POTASSIUM SERPL-SCNC: 4 MMOL/L — SIGNIFICANT CHANGE UP (ref 3.5–5.3)
PROT SERPL-MCNC: 5.6 G/DL — LOW (ref 6–8.3)
PROT SERPL-MCNC: 5.9 G/DL — LOW (ref 6–8.3)
PROTHROM AB SERPL-ACNC: 13.1 SEC — HIGH (ref 9.8–12.7)
RBC # BLD: 2.46 M/UL — LOW (ref 3.8–5.2)
RBC # BLD: 2.81 M/UL — LOW (ref 3.8–5.2)
RBC # FLD: 15.5 % — SIGNIFICANT CHANGE UP (ref 10.3–16.9)
RBC # FLD: 15.8 % — SIGNIFICANT CHANGE UP (ref 10.3–16.9)
RH IG SCN BLD-IMP: POSITIVE — SIGNIFICANT CHANGE UP
SAO2 % BLDA: 98 % — SIGNIFICANT CHANGE UP (ref 95–100)
SAO2 % BLDA: 98 % — SIGNIFICANT CHANGE UP (ref 95–100)
SODIUM BLDA-SCNC: 141 MMOL/L — SIGNIFICANT CHANGE UP (ref 138–146)
SODIUM SERPL-SCNC: 139 MMOL/L — SIGNIFICANT CHANGE UP (ref 135–145)
SODIUM SERPL-SCNC: 142 MMOL/L — SIGNIFICANT CHANGE UP (ref 135–145)
SURGICAL PATHOLOGY STUDY: SIGNIFICANT CHANGE UP
WBC # BLD: 11.9 K/UL — HIGH (ref 3.8–10.5)
WBC # BLD: 15 K/UL — HIGH (ref 3.8–10.5)
WBC # FLD AUTO: 11.9 K/UL — HIGH (ref 3.8–10.5)
WBC # FLD AUTO: 15 K/UL — HIGH (ref 3.8–10.5)

## 2017-08-10 PROCEDURE — 49002 REOPENING OF ABDOMEN: CPT | Mod: 80,59

## 2017-08-10 PROCEDURE — 99232 SBSQ HOSP IP/OBS MODERATE 35: CPT | Mod: GC

## 2017-08-10 PROCEDURE — 71010: CPT | Mod: 26

## 2017-08-10 PROCEDURE — 49020 DRAINAGE ABDOM ABSCESS OPEN: CPT | Mod: 80

## 2017-08-10 PROCEDURE — 99233 SBSQ HOSP IP/OBS HIGH 50: CPT | Mod: GC

## 2017-08-10 PROCEDURE — 49002 REOPENING OF ABDOMEN: CPT | Mod: 58,59

## 2017-08-10 PROCEDURE — 11005 DBRDMT SKIN ABDOMINAL WALL: CPT | Mod: 58,59,GC

## 2017-08-10 PROCEDURE — 44015 INSERT NEEDLE CATH BOWEL: CPT | Mod: 58

## 2017-08-10 PROCEDURE — 49020 DRAINAGE ABDOM ABSCESS OPEN: CPT | Mod: 58

## 2017-08-10 PROCEDURE — 97606 NEG PRS WND THER DME>50 SQCM: CPT | Mod: 58

## 2017-08-10 PROCEDURE — 49568: CPT | Mod: 58

## 2017-08-10 RX ORDER — ELECTROLYTE SOLUTION,INJ
1 VIAL (ML) INTRAVENOUS
Qty: 0 | Refills: 0 | Status: DISCONTINUED | OUTPATIENT
Start: 2017-08-10 | End: 2017-08-10

## 2017-08-10 RX ORDER — CHLORHEXIDINE GLUCONATE 213 G/1000ML
15 SOLUTION TOPICAL
Qty: 0 | Refills: 0 | Status: DISCONTINUED | OUTPATIENT
Start: 2017-08-10 | End: 2017-08-18

## 2017-08-10 RX ORDER — I.V. FAT EMULSION 20 G/100ML
20 EMULSION INTRAVENOUS
Qty: 0 | Refills: 0 | Status: DISCONTINUED | OUTPATIENT
Start: 2017-08-10 | End: 2017-08-10

## 2017-08-10 RX ORDER — ACETAMINOPHEN 500 MG
1000 TABLET ORAL ONCE
Qty: 0 | Refills: 0 | Status: COMPLETED | OUTPATIENT
Start: 2017-08-10 | End: 2017-08-10

## 2017-08-10 RX ORDER — FUROSEMIDE 40 MG
20 TABLET ORAL ONCE
Qty: 0 | Refills: 0 | Status: COMPLETED | OUTPATIENT
Start: 2017-08-10 | End: 2017-08-10

## 2017-08-10 RX ADMIN — HEPARIN SODIUM 7500 UNIT(S): 5000 INJECTION INTRAVENOUS; SUBCUTANEOUS at 14:30

## 2017-08-10 RX ADMIN — NYSTATIN CREAM 1 APPLICATION(S): 100000 CREAM TOPICAL at 05:34

## 2017-08-10 RX ADMIN — PROPOFOL 1 MICROGRAM(S)/KG/MIN: 10 INJECTION, EMULSION INTRAVENOUS at 19:20

## 2017-08-10 RX ADMIN — PIPERACILLIN AND TAZOBACTAM 200 GRAM(S): 4; .5 INJECTION, POWDER, LYOPHILIZED, FOR SOLUTION INTRAVENOUS at 17:19

## 2017-08-10 RX ADMIN — CHLORHEXIDINE GLUCONATE 15 MILLILITER(S): 213 SOLUTION TOPICAL at 17:19

## 2017-08-10 RX ADMIN — FLUCONAZOLE 100 MILLIGRAM(S): 150 TABLET ORAL at 10:29

## 2017-08-10 RX ADMIN — PIPERACILLIN AND TAZOBACTAM 200 GRAM(S): 4; .5 INJECTION, POWDER, LYOPHILIZED, FOR SOLUTION INTRAVENOUS at 23:58

## 2017-08-10 RX ADMIN — HEPARIN SODIUM 7500 UNIT(S): 5000 INJECTION INTRAVENOUS; SUBCUTANEOUS at 05:31

## 2017-08-10 RX ADMIN — PROPOFOL 1 MICROGRAM(S)/KG/MIN: 10 INJECTION, EMULSION INTRAVENOUS at 22:55

## 2017-08-10 RX ADMIN — FENTANYL CITRATE 1 MICROGRAM(S)/KG/HR: 50 INJECTION INTRAVENOUS at 19:00

## 2017-08-10 RX ADMIN — LINEZOLID 300 MILLIGRAM(S): 600 INJECTION, SOLUTION INTRAVENOUS at 05:59

## 2017-08-10 RX ADMIN — Medication 1 EACH: at 19:15

## 2017-08-10 RX ADMIN — NYSTATIN CREAM 1 APPLICATION(S): 100000 CREAM TOPICAL at 17:19

## 2017-08-10 RX ADMIN — HEPARIN SODIUM 7500 UNIT(S): 5000 INJECTION INTRAVENOUS; SUBCUTANEOUS at 21:37

## 2017-08-10 RX ADMIN — Medication 400 MILLIGRAM(S): at 21:35

## 2017-08-10 RX ADMIN — Medication 20 MILLIGRAM(S): at 19:15

## 2017-08-10 RX ADMIN — I.V. FAT EMULSION 8.33 GRAM(S): 20 EMULSION INTRAVENOUS at 21:13

## 2017-08-10 RX ADMIN — PIPERACILLIN AND TAZOBACTAM 200 GRAM(S): 4; .5 INJECTION, POWDER, LYOPHILIZED, FOR SOLUTION INTRAVENOUS at 12:15

## 2017-08-10 RX ADMIN — PIPERACILLIN AND TAZOBACTAM 200 GRAM(S): 4; .5 INJECTION, POWDER, LYOPHILIZED, FOR SOLUTION INTRAVENOUS at 05:31

## 2017-08-10 RX ADMIN — PROPOFOL 1 MICROGRAM(S)/KG/MIN: 10 INJECTION, EMULSION INTRAVENOUS at 11:10

## 2017-08-10 RX ADMIN — PANTOPRAZOLE SODIUM 40 MILLIGRAM(S): 20 TABLET, DELAYED RELEASE ORAL at 11:10

## 2017-08-10 RX ADMIN — LINEZOLID 300 MILLIGRAM(S): 600 INJECTION, SOLUTION INTRAVENOUS at 18:00

## 2017-08-10 NOTE — PROGRESS NOTE ADULT - SUBJECTIVE AND OBJECTIVE BOX
INTERVAL HPI/OVERNIGHT EVENTS:    No events overnight.  No pressors.  Fi02: 40 %.  Still intubated      ANTIBIOTICS/RELEVANT:  zyvox  fluconazole  zosyn    MEDICATIONS  (STANDING):  insulin lispro (HumaLOG) corrective regimen sliding scale   SubCutaneous Before meals and at bedtime  dextrose 5%. 1000 milliLiter(s) (50 mL/Hr) IV Continuous <Continuous>  dextrose 50% Injectable 25 Gram(s) IV Push once  sodium chloride 0.9% lock flush 20 milliLiter(s) IV Push once  cyanocobalamin Injectable 1000 MICROGram(s) SubCutaneous every 7 days  dextrose 50% Injectable 12.5 Gram(s) IV Push once  heparin  Injectable 7500 Unit(s) SubCutaneous every 8 hours  fat emulsion 20% Infusion 50 Gram(s) (20.8 mL/Hr) IV Continuous <Continuous>  propofol Infusion 1.662 MICROgram(s)/kG/Min (1 mL/Hr) IV Continuous <Continuous>  piperacillin/tazobactam IVPB. 3.375 Gram(s) IV Intermittent every 6 hours  nystatin Powder 1 Application(s) Topical two times a day  pantoprazole  Injectable 40 milliGRAM(s) IV Push daily  linezolid  IVPB 600 milliGRAM(s) IV Intermittent every 12 hours  fentaNYL   Infusion 0.101 MICROgram(s)/kG/Hr (1 mL/Hr) IV Continuous <Continuous>  calcitriol Injectable 1 MICROGram(s) IV Push <User Schedule>  fluconAZOLE IVPB 400 milliGRAM(s) IV Intermittent every 24 hours  Parenteral Nutrition - Adult 1 Each (64 mL/Hr) TPN Continuous <Continuous>  chlorhexidine 0.12% Liquid 15 milliLiter(s) Swish and Spit two times a day  Parenteral Nutrition - Adult 1 Each (65 mL/Hr) TPN Continuous <Continuous>  fat emulsion 20% Infusion 20 Gram(s) (8.3 mL/Hr) IV Continuous <Continuous>    MEDICATIONS  (PRN):  ondansetron Injectable 4 milliGRAM(s) IV Push every 6 hours PRN Nausea  sodium chloride 0.9% lock flush 10 milliLiter(s) IV Push every 1 hour PRN After each medication administration  sodium chloride 0.9% lock flush 10 milliLiter(s) IV Push every 12 hours PRN Lumen of catheter NOT used        Vital Signs Last 24 Hrs  T(C): 37.8 (10 Aug 2017 09:08), Max: 37.9 (10 Aug 2017 06:05)  T(F): 100.1 (10 Aug 2017 09:08), Max: 100.3 (10 Aug 2017 06:05)  HR: 68 (10 Aug 2017 10:00) (66 - 80)  BP: 92/48 (10 Aug 2017 10:00) (82/52 - 121/61)  BP(mean): 62 (10 Aug 2017 10:00) (60 - 86)  RR: 12 (10 Aug 2017 10:00) (11 - 23)  SpO2: 100% (10 Aug 2017 10:00) (98% - 100%)    PHYSICAL EXAM:  Constitutional:intubated, sedated  Eyes:tracks with eyes  Ear/Nose/Throat:intubated  Respiratory: CTA eileen  Cardiovascular: S1S2 RRR, no murmurs  Gastrointestinal:soft, (+) BS, soft, large wound vac in place  Extremities:no e/e/c  Vascular: DP Pulse:	  skin:  left IJ    LABS:                        7.2    11.9  )-----------( 325      ( 10 Aug 2017 03:12 )             23.1     08-10    139  |  100  |  26<H>  ----------------------------<  187<H>  4.0   |  30  |  0.80    Ca    8.8      10 Aug 2017 03:13  Phos  4.6     08-10  Mg     2.2     08-10    TPro  5.6<L>  /  Alb  2.0<L>  /  TBili  0.6  /  DBili  x   /  AST  13  /  ALT  9<L>  /  AlkPhos  155<H>  08-10    PT/INR - ( 10 Aug 2017 03:12 )   PT: 13.1 sec;   INR: 1.18          PTT - ( 10 Aug 2017 03:12 )  PTT:30.9 sec      MICROBIOLOGY:  Culture - Body Fluid with Gram Stain (08.02.17 @ 12:19)    -  Gentamicin: S <=4    -  Gentamicin: S <=4    -  Piperacillin/Tazobactam: S <=16    -  Piperacillin/Tazobactam: S <=16    -  Ampicillin: S <=8    -  Ceftriaxone: S <=1    -  Ceftriaxone: S <=1    -  Trimethoprim/Sulfamethoxazole: S <=2/38    -  Trimethoprim/Sulfamethoxazole: S <=2/38    Gram Stain:   Moderate Gram Negative Rods  Numerous White blood cells    -  Ampicillin: R >16    -  Ampicillin/Sulbactam: S <=8/4    -  Ampicillin/Sulbactam: S <=8/4    -  Cefazolin: S <=8    -  Cefazolin: S <=8    -  Ciprofloxacin: S <=1    -  Ciprofloxacin: S <=1    -  Tobramycin: S <=4    -  Tobramycin: S <=4    Specimen Source: .Body Fluid LEFT lower CHECO    Culture Results:   Numerous Escherichia coli  Few Klebsiella oxytoca    Organism Identification: Escherichia coli  Klebsiella oxytoca    Organism: Escherichia coli    Organism: Klebsiella oxytoca    Method Type: ALISHA    Method Type: ALISHA        RADIOLOGY & ADDITIONAL STUDIES:  CXR (8/9):    FINDINGS: Heart size, mediastinal and hilar contours are within normal   limits. There is left-sided pleural effusion which is layering   posteriorly. There is a left IJV catheter the tip located the confluence   the basilic vessels. There is an endotracheal tube in good position.   There is a left-sided chest tube is located more inferiorly on the left   lower lung zone. Interval removal of a right IJV catheter. Improvement in   right-sided pleural effusion.

## 2017-08-10 NOTE — PROGRESS NOTE ADULT - ASSESSMENT
57 F s/p  SBR resection, fistula resection, esophagojejunostomy revision w/ post-op course complicated by RTOR for distal anastomosis breakdown, ATN, acute respiratory failure, & septic shock.     Neuro: Propofol gtt, Fentanyl gtt  CV: MAP 65  Resp: AC//40/12/10  GI/FEN: NPO, TPN/lipids, Vit B12, Vit D, Protonix  : Yu for I/O monitoring in critically ill pt; BUN improving  Endo: ISS  ID: anastamotic leak: fluconazole (8/7--), zosyn (8/1--), linezolid (7/25-8/1; 8/2-8/10)  ///dc: Perioperative Gent/Vanc, colistin (7/29-8/1), vanc (8/1-8/2), micafungin (7/29-8/7)  Heme/PPX: SCDs, SQH  Lines: Radha (7/24-8/8), L basilic vein PICC (7/25-8/8), L IJ TLC (7/30-)  Drains: RLQ mediastinal CHECO to bulb suction, LLQ CHECO to bulb suction. Wound vac to midline  Wounds: Midline xiphoid to pubis incision; DTI & stage 2 pressure ulcer noted. 57 F s/p  SBR resection, fistula resection, esophagojejunostomy revision w/ post-op course complicated by RTOR for distal anastomosis breakdown, ATN, acute respiratory failure, & septic shock.     Neuro: Propofol gtt, Fentanyl gtt  CV: MAP 65 with good perfusion. Diuresis held with lower BP and improved edema.  Resp: AC//40/12/10  GI/FEN: NPO, TPN/lipids, Vit B12, Vit D, Protonix  : Yu for I/O monitoring in critically ill pt; BUN improving  Endo: ISS  ID: anastamotic leak: fluconazole (8/7--), zosyn (8/1--), linezolid (7/25-8/1; 8/2-8/10)  ///dc: Perioperative Gent/Vanc, colistin (7/29-8/1), vanc (8/1-8/2), micafungin (7/29-8/7)  Heme/PPX: SCDs, SQH  Lines: Radha (7/24-8/8), L basilic vein PICC (7/25-8/8), L IJ TLC (7/30-)  Drains: RLQ mediastinal CHECO to bulb suction, LLQ CHECO to bulb suction. Wound vac to midline  Wounds: Midline xiphoid to pubis incision; DTI & stage 2 pressure ulcer noted.    To OR today.

## 2017-08-10 NOTE — PROGRESS NOTE ADULT - ASSESSMENT
IMPRESSION:  Intraabdominal wound infection s/p exploration    Recommend:  1.  Can stop linezolid now  2.  Continue Zosyn and Fluconazole  3.  If patient respikes fever or WBC increases, can check repeat blood cultures

## 2017-08-10 NOTE — PROGRESS NOTE ADULT - SUBJECTIVE AND OBJECTIVE BOX
SICU DAILY PROGRESS NOTE    INTERVAL HPI / OVERNIGHT EVENTS:   O/N: No issues.   8/9: Given Lasix 20 at 11am w/ good response; UOP >2400 for day shift. Edema much improved & SBP 90s thus will stop diuresis for now.   O/N: given lasix 20 at 2am.     MEDICATIONS  (STANDING):  insulin lispro (HumaLOG) corrective regimen sliding scale   SubCutaneous Before meals and at bedtime  dextrose 5%. 1000 milliLiter(s) (50 mL/Hr) IV Continuous <Continuous>  dextrose 50% Injectable 25 Gram(s) IV Push once  sodium chloride 0.9% lock flush 20 milliLiter(s) IV Push once  cyanocobalamin Injectable 1000 MICROGram(s) SubCutaneous every 7 days  dextrose 50% Injectable 12.5 Gram(s) IV Push once  heparin  Injectable 7500 Unit(s) SubCutaneous every 8 hours  fat emulsion 20% Infusion 50 Gram(s) (20.8 mL/Hr) IV Continuous <Continuous>  propofol Infusion 1.662 MICROgram(s)/kG/Min (1 mL/Hr) IV Continuous <Continuous>  piperacillin/tazobactam IVPB. 3.375 Gram(s) IV Intermittent every 6 hours  nystatin Powder 1 Application(s) Topical two times a day  pantoprazole  Injectable 40 milliGRAM(s) IV Push daily  linezolid  IVPB 600 milliGRAM(s) IV Intermittent every 12 hours  fentaNYL   Infusion 0.101 MICROgram(s)/kG/Hr (1 mL/Hr) IV Continuous <Continuous>  calcitriol Injectable 1 MICROGram(s) IV Push <User Schedule>  fluconAZOLE IVPB 400 milliGRAM(s) IV Intermittent every 24 hours  Parenteral Nutrition - Adult 1 Each (64 mL/Hr) TPN Continuous <Continuous>  chlorhexidine 0.12% Liquid 15 milliLiter(s) Swish and Spit two times a day    MEDICATIONS  (PRN):  ondansetron Injectable 4 milliGRAM(s) IV Push every 6 hours PRN Nausea  sodium chloride 0.9% lock flush 10 milliLiter(s) IV Push every 1 hour PRN After each medication administration  sodium chloride 0.9% lock flush 10 milliLiter(s) IV Push every 12 hours PRN Lumen of catheter NOT used    CENTRAL LINE: YES - ASHELY, 7/30             Remove: NO; will replace as soon as HCP can be reached for consent           Indication: TPN    LI: YES             Remove: NO           Indication: UOP monitoring in critically ill patient    A-LINE: NO     Vital Signs Last 24 Hrs  T(C): 37.9 (10 Aug 2017 06:05), Max: 37.9 (10 Aug 2017 06:05)  T(F): 100.3 (10 Aug 2017 06:05), Max: 100.3 (10 Aug 2017 06:05)  HR: 70 (10 Aug 2017 07:00) (66 - 80)  BP: 92/55 (10 Aug 2017 07:00) (82/52 - 121/61)  BP(mean): 68 (10 Aug 2017 07:00) (61 - 86)  RR: 11 (10 Aug 2017 07:00) (11 - 23)  SpO2: 100% (10 Aug 2017 07:00) (98% - 100%)    Mode: AC/ CMV (Assist Control/ Continuous Mandatory Ventilation)  RR (machine): 12  TV (machine): 450  FiO2: 40  PEEP: 10  ITime: 1  MAP: 13  PIP: 24    I&O's Detail    09 Aug 2017 07:01  -  10 Aug 2017 07:00  --------------------------------------------------------  IN:    Fat Emulsion 20%: 107.9 mL    fentaNYL  Infusion: 189 mL    IV PiggyBack: 1000 mL    propofol Infusion: 134 mL    TPN (Total Parenteral Nutrition): 1547 mL  Total IN: 2977.9 mL    OUT:    Bulb: 2 mL    Bulb: 200 mL    Indwelling Catheter - Urethral: 4100 mL    VAC (Vacuum Assisted Closure) System: 300 mL  Total OUT: 4602 mL    Total NET: -1624.1 mL    PHYSICAL EXAM:  NEURO: Intubated, alert, interactive, NAD  HEENT: PERRL, MMM  CV: RRR  PULM: Intubated, breath sounds improved from prior exams  ABD: Softly distended, TTP. LLQ CHECO drain- seropurulent/bilious. RLQ mediastinal drain- minimal SS. Midline abthera vac w/ good seal- bilious output.  : Li- clear yellow urine.   EXTR: WWP. Notable RUE edema vs. LUE.   Vasc: 2+ radial, 1+ DP pulses  MSK: no joint swelling.   SKIN: Fungal-appearing rash beneath panniculus stable.     LABS:                        7.2    11.9  )-----------( 325      ( 10 Aug 2017 03:12 )             23.1     139  |  100  |  26<H>  ----------------------------<  187<H>  4.0   |  30  |  0.80    Ca    8.8      10 Aug 2017 03:13  Phos  4.6     08-10  Mg     2.2     08-10    TPro  5.6<L>  /  Alb  2.0<L>  /  TBili  0.6  /  DBili  x   /  AST  13  /  ALT  9<L>  /  AlkPhos  155<H>  08-10    PT/INR - ( 10 Aug 2017 03:12 )   PT: 13.1 sec;   INR: 1.18    PTT - ( 10 Aug 2017 03:12 )  PTT:30.9 sec SICU DAILY PROGRESS NOTE    INTERVAL HPI / OVERNIGHT EVENTS:   O/N: No issues. ROS not obtainable.  8/9: Given Lasix 20 at 11am w/ good response; UOP >2400 for day shift. Edema much improved & SBP 90s thus will stop diuresis for now.   O/N: given lasix 20 at 2am.     MEDICATIONS  (STANDING):  insulin lispro (HumaLOG) corrective regimen sliding scale   SubCutaneous Before meals and at bedtime  dextrose 5%. 1000 milliLiter(s) (50 mL/Hr) IV Continuous <Continuous>  dextrose 50% Injectable 25 Gram(s) IV Push once  sodium chloride 0.9% lock flush 20 milliLiter(s) IV Push once  cyanocobalamin Injectable 1000 MICROGram(s) SubCutaneous every 7 days  dextrose 50% Injectable 12.5 Gram(s) IV Push once  heparin  Injectable 7500 Unit(s) SubCutaneous every 8 hours  fat emulsion 20% Infusion 50 Gram(s) (20.8 mL/Hr) IV Continuous <Continuous>  propofol Infusion 1.662 MICROgram(s)/kG/Min (1 mL/Hr) IV Continuous <Continuous>  piperacillin/tazobactam IVPB. 3.375 Gram(s) IV Intermittent every 6 hours  nystatin Powder 1 Application(s) Topical two times a day  pantoprazole  Injectable 40 milliGRAM(s) IV Push daily  linezolid  IVPB 600 milliGRAM(s) IV Intermittent every 12 hours  fentaNYL   Infusion 0.101 MICROgram(s)/kG/Hr (1 mL/Hr) IV Continuous <Continuous>  calcitriol Injectable 1 MICROGram(s) IV Push <User Schedule>  fluconAZOLE IVPB 400 milliGRAM(s) IV Intermittent every 24 hours  Parenteral Nutrition - Adult 1 Each (64 mL/Hr) TPN Continuous <Continuous>  chlorhexidine 0.12% Liquid 15 milliLiter(s) Swish and Spit two times a day    MEDICATIONS  (PRN):  ondansetron Injectable 4 milliGRAM(s) IV Push every 6 hours PRN Nausea  sodium chloride 0.9% lock flush 10 milliLiter(s) IV Push every 1 hour PRN After each medication administration  sodium chloride 0.9% lock flush 10 milliLiter(s) IV Push every 12 hours PRN Lumen of catheter NOT used    CENTRAL LINE: YES - ASHELY, 7/30             Remove: NO; will replace as soon as HCP can be reached for consent           Indication: TPN    LI: YES             Remove: NO           Indication: UOP monitoring in critically ill patient    A-LINE: NO     Vital Signs Last 24 Hrs  T(C): 37.9 (10 Aug 2017 06:05), Max: 37.9 (10 Aug 2017 06:05)  T(F): 100.3 (10 Aug 2017 06:05), Max: 100.3 (10 Aug 2017 06:05)  HR: 70 (10 Aug 2017 07:00) (66 - 80)  BP: 92/55 (10 Aug 2017 07:00) (82/52 - 121/61)  BP(mean): 68 (10 Aug 2017 07:00) (61 - 86)  RR: 11 (10 Aug 2017 07:00) (11 - 23)  SpO2: 100% (10 Aug 2017 07:00) (98% - 100%)    Mode: AC/ CMV (Assist Control/ Continuous Mandatory Ventilation)  RR (machine): 12  TV (machine): 450  FiO2: 40  PEEP: 10  ITime: 1  MAP: 13  PIP: 24    I&O's Detail    09 Aug 2017 07:01  -  10 Aug 2017 07:00  --------------------------------------------------------  IN:    Fat Emulsion 20%: 107.9 mL    fentaNYL  Infusion: 189 mL    IV PiggyBack: 1000 mL    propofol Infusion: 134 mL    TPN (Total Parenteral Nutrition): 1547 mL  Total IN: 2977.9 mL    OUT:    Bulb: 2 mL    Bulb: 200 mL    Indwelling Catheter - Urethral: 4100 mL    VAC (Vacuum Assisted Closure) System: 300 mL  Total OUT: 4602 mL    Total NET: -1624.1 mL    PHYSICAL EXAM:  NEURO: Intubated, alert, interactive, NAD  HEENT: PERRL, MMM  CV: RRR  PULM: Intubated, breath sounds improved from prior exams  ABD: Softly distended, TTP. LLQ CHECO drain- seropurulent/bilious. RLQ mediastinal drain- minimal SS. Midline abthera vac w/ good seal- bilious output.  : Li- clear yellow urine.   EXTR: WWP. Notable RUE edema vs. LUE.   Vasc: 2+ radial, 1+ DP pulses  MSK: no joint swelling.   SKIN: Fungal-appearing rash beneath panniculus much improved.     LABS:                        7.2    11.9  )-----------( 325      ( 10 Aug 2017 03:12 )             23.1     139  |  100  |  26<H>  ----------------------------<  187<H>  4.0   |  30  |  0.80    Ca    8.8      10 Aug 2017 03:13  Phos  4.6     08-10  Mg     2.2     08-10    TPro  5.6<L>  /  Alb  2.0<L>  /  TBili  0.6  /  DBili  x   /  AST  13  /  ALT  9<L>  /  AlkPhos  155<H>  08-10    PT/INR - ( 10 Aug 2017 03:12 )   PT: 13.1 sec;   INR: 1.18    PTT - ( 10 Aug 2017 03:12 )  PTT:30.9 sec

## 2017-08-10 NOTE — BRIEF OPERATIVE NOTE - PROCEDURE
Application, VAC device, wound  08/10/2017    Active  NALPER  Exploratory laparotomy  07/30/2017  primary repair of anastamotic breakdown, abdominal vac placement  Active  Valente Stout

## 2017-08-10 NOTE — BRIEF OPERATIVE NOTE - OPERATION/FINDINGS
Entire small bowel matted and unable to by freed up. Leak noted from previous anastomosis site on left side, mid-abdomen. Malecot drain placed in enterotomy. CHECO x 2 placed, one in LUQ and one in LLQ. Necrotic fascia debrided, pulse irrigated. Vicryl mesh sutured to fascia circumferentially. Xeroform gauze placed on top of mesh, and vac dressing applied over this.

## 2017-08-11 LAB
ALBUMIN SERPL ELPH-MCNC: 1.9 G/DL — LOW (ref 3.3–5)
ALP SERPL-CCNC: 167 U/L — HIGH (ref 40–120)
ALT FLD-CCNC: 11 U/L — SIGNIFICANT CHANGE UP (ref 10–45)
ANION GAP SERPL CALC-SCNC: 12 MMOL/L — SIGNIFICANT CHANGE UP (ref 5–17)
APPEARANCE UR: SIGNIFICANT CHANGE UP
AST SERPL-CCNC: 14 U/L — SIGNIFICANT CHANGE UP (ref 10–40)
BASOPHILS NFR BLD AUTO: 0.8 % — SIGNIFICANT CHANGE UP (ref 0–2)
BILIRUB SERPL-MCNC: 0.8 MG/DL — SIGNIFICANT CHANGE UP (ref 0.2–1.2)
BILIRUB UR-MCNC: NEGATIVE — SIGNIFICANT CHANGE UP
BUN SERPL-MCNC: 27 MG/DL — HIGH (ref 7–23)
CALCIUM SERPL-MCNC: 8.8 MG/DL — SIGNIFICANT CHANGE UP (ref 8.4–10.5)
CHLORIDE SERPL-SCNC: 100 MMOL/L — SIGNIFICANT CHANGE UP (ref 96–108)
CO2 SERPL-SCNC: 29 MMOL/L — SIGNIFICANT CHANGE UP (ref 22–31)
COLOR SPEC: YELLOW — SIGNIFICANT CHANGE UP
CREAT SERPL-MCNC: 0.8 MG/DL — SIGNIFICANT CHANGE UP (ref 0.5–1.3)
DIFF PNL FLD: (no result)
EOSINOPHIL NFR BLD AUTO: 1.4 % — SIGNIFICANT CHANGE UP (ref 0–6)
GLUCOSE SERPL-MCNC: 131 MG/DL — HIGH (ref 70–99)
GLUCOSE UR QL: NEGATIVE — SIGNIFICANT CHANGE UP
GRAM STN FLD: SIGNIFICANT CHANGE UP
HCT VFR BLD CALC: 23.1 % — LOW (ref 34.5–45)
HGB BLD-MCNC: 7.1 G/DL — LOW (ref 11.5–15.5)
KETONES UR-MCNC: NEGATIVE — SIGNIFICANT CHANGE UP
LEUKOCYTE ESTERASE UR-ACNC: NEGATIVE — SIGNIFICANT CHANGE UP
LYMPHOCYTES # BLD AUTO: 15.2 % — SIGNIFICANT CHANGE UP (ref 13–44)
MAGNESIUM SERPL-MCNC: 2.1 MG/DL — SIGNIFICANT CHANGE UP (ref 1.6–2.6)
MCHC RBC-ENTMCNC: 29 PG — SIGNIFICANT CHANGE UP (ref 27–34)
MCHC RBC-ENTMCNC: 30.7 G/DL — LOW (ref 32–36)
MCV RBC AUTO: 94.3 FL — SIGNIFICANT CHANGE UP (ref 80–100)
MONOCYTES NFR BLD AUTO: 8.7 % — SIGNIFICANT CHANGE UP (ref 2–14)
NEUTROPHILS NFR BLD AUTO: 73.9 % — SIGNIFICANT CHANGE UP (ref 43–77)
NITRITE UR-MCNC: NEGATIVE — SIGNIFICANT CHANGE UP
O2 CT VFR BLDA CALC: SIGNIFICANT CHANGE UP (ref 15–23)
PH UR: 6 — SIGNIFICANT CHANGE UP (ref 5–8)
PHOSPHATE SERPL-MCNC: 3.2 MG/DL — SIGNIFICANT CHANGE UP (ref 2.5–4.5)
PLATELET # BLD AUTO: 377 K/UL — SIGNIFICANT CHANGE UP (ref 150–400)
POTASSIUM SERPL-MCNC: 4 MMOL/L — SIGNIFICANT CHANGE UP (ref 3.5–5.3)
POTASSIUM SERPL-SCNC: 4 MMOL/L — SIGNIFICANT CHANGE UP (ref 3.5–5.3)
PROT SERPL-MCNC: 5.5 G/DL — LOW (ref 6–8.3)
PROT UR-MCNC: NEGATIVE MG/DL — SIGNIFICANT CHANGE UP
RBC # BLD: 2.45 M/UL — LOW (ref 3.8–5.2)
RBC # FLD: 15.5 % — SIGNIFICANT CHANGE UP (ref 10.3–16.9)
SODIUM SERPL-SCNC: 141 MMOL/L — SIGNIFICANT CHANGE UP (ref 135–145)
SP GR SPEC: 1.01 — SIGNIFICANT CHANGE UP (ref 1–1.03)
UROBILINOGEN FLD QL: 0.2 E.U./DL — SIGNIFICANT CHANGE UP
WBC # BLD: 15.6 K/UL — HIGH (ref 3.8–10.5)
WBC # FLD AUTO: 15.6 K/UL — HIGH (ref 3.8–10.5)

## 2017-08-11 PROCEDURE — 93971 EXTREMITY STUDY: CPT | Mod: 26,RT

## 2017-08-11 PROCEDURE — 99233 SBSQ HOSP IP/OBS HIGH 50: CPT | Mod: GC

## 2017-08-11 PROCEDURE — 99233 SBSQ HOSP IP/OBS HIGH 50: CPT

## 2017-08-11 PROCEDURE — 71010: CPT | Mod: 26

## 2017-08-11 RX ORDER — FENTANYL CITRATE 50 UG/ML
0.1 INJECTION INTRAVENOUS
Qty: 2500 | Refills: 0 | Status: DISCONTINUED | OUTPATIENT
Start: 2017-08-11 | End: 2017-08-12

## 2017-08-11 RX ORDER — HEPARIN SODIUM 5000 [USP'U]/ML
1800 INJECTION INTRAVENOUS; SUBCUTANEOUS
Qty: 25000 | Refills: 0 | Status: DISCONTINUED | OUTPATIENT
Start: 2017-08-11 | End: 2017-08-12

## 2017-08-11 RX ORDER — ELECTROLYTE SOLUTION,INJ
1 VIAL (ML) INTRAVENOUS
Qty: 0 | Refills: 0 | Status: DISCONTINUED | OUTPATIENT
Start: 2017-08-11 | End: 2017-08-11

## 2017-08-11 RX ORDER — MEROPENEM 1 G/30ML
INJECTION INTRAVENOUS
Qty: 0 | Refills: 0 | Status: DISCONTINUED | OUTPATIENT
Start: 2017-08-11 | End: 2017-08-12

## 2017-08-11 RX ORDER — MEROPENEM 1 G/30ML
1000 INJECTION INTRAVENOUS EVERY 8 HOURS
Qty: 0 | Refills: 0 | Status: DISCONTINUED | OUTPATIENT
Start: 2017-08-11 | End: 2017-08-12

## 2017-08-11 RX ORDER — MEROPENEM 1 G/30ML
1000 INJECTION INTRAVENOUS ONCE
Qty: 0 | Refills: 0 | Status: COMPLETED | OUTPATIENT
Start: 2017-08-11 | End: 2017-08-11

## 2017-08-11 RX ORDER — I.V. FAT EMULSION 20 G/100ML
50 EMULSION INTRAVENOUS
Qty: 0 | Refills: 0 | Status: DISCONTINUED | OUTPATIENT
Start: 2017-08-11 | End: 2017-08-12

## 2017-08-11 RX ADMIN — FLUCONAZOLE 100 MILLIGRAM(S): 150 TABLET ORAL at 10:02

## 2017-08-11 RX ADMIN — NYSTATIN CREAM 1 APPLICATION(S): 100000 CREAM TOPICAL at 05:31

## 2017-08-11 RX ADMIN — LINEZOLID 300 MILLIGRAM(S): 600 INJECTION, SOLUTION INTRAVENOUS at 17:03

## 2017-08-11 RX ADMIN — PANTOPRAZOLE SODIUM 40 MILLIGRAM(S): 20 TABLET, DELAYED RELEASE ORAL at 11:08

## 2017-08-11 RX ADMIN — CHLORHEXIDINE GLUCONATE 15 MILLILITER(S): 213 SOLUTION TOPICAL at 05:31

## 2017-08-11 RX ADMIN — MEROPENEM 200 MILLIGRAM(S): 1 INJECTION INTRAVENOUS at 18:44

## 2017-08-11 RX ADMIN — HEPARIN SODIUM 7500 UNIT(S): 5000 INJECTION INTRAVENOUS; SUBCUTANEOUS at 05:30

## 2017-08-11 RX ADMIN — CALCITRIOL 1 MICROGRAM(S): 0.5 CAPSULE ORAL at 11:08

## 2017-08-11 RX ADMIN — NYSTATIN CREAM 1 APPLICATION(S): 100000 CREAM TOPICAL at 17:04

## 2017-08-11 RX ADMIN — HEPARIN SODIUM 7500 UNIT(S): 5000 INJECTION INTRAVENOUS; SUBCUTANEOUS at 13:30

## 2017-08-11 RX ADMIN — Medication 1 EACH: at 17:09

## 2017-08-11 RX ADMIN — HEPARIN SODIUM 18 UNIT(S)/HR: 5000 INJECTION INTRAVENOUS; SUBCUTANEOUS at 18:43

## 2017-08-11 RX ADMIN — PIPERACILLIN AND TAZOBACTAM 200 GRAM(S): 4; .5 INJECTION, POWDER, LYOPHILIZED, FOR SOLUTION INTRAVENOUS at 05:30

## 2017-08-11 RX ADMIN — CHLORHEXIDINE GLUCONATE 15 MILLILITER(S): 213 SOLUTION TOPICAL at 17:04

## 2017-08-11 RX ADMIN — LINEZOLID 300 MILLIGRAM(S): 600 INJECTION, SOLUTION INTRAVENOUS at 06:16

## 2017-08-11 RX ADMIN — MEROPENEM 200 MILLIGRAM(S): 1 INJECTION INTRAVENOUS at 11:39

## 2017-08-11 RX ADMIN — I.V. FAT EMULSION 20.83 GRAM(S): 20 EMULSION INTRAVENOUS at 21:00

## 2017-08-11 NOTE — PROGRESS NOTE ADULT - SUBJECTIVE AND OBJECTIVE BOX
SICU DAILY PROGRESS NOTE    INTERVAL HPI / OVERNIGHT EVENTS:   : Per ID, stopped Zosyn, started meropenem 1q8, continued Zyvox. duplex ordered   O/N: [facility] central line removed. got lasix 20 at 7pm, Temp 101.9 at 9pm, bld cx sent, UA ordered, sputum cx ordered, SBP dropped to 80s at 1am, improved to 100's when propofol is off.   8/10: Planned RTOR- Leak noted from previous anastomosis site on left side, mid-abdomen. Malecot drain placed in enterotomy. CHECO x 2 placed, one in LUQ and one in LLQ. Necrotic fascia debrided, pulse irrigated. Vicryl mesh sutured to fascia circumferentially. Xeroform gauze placed on top of mesh, and vac dressing applied over this.      MEDICATIONS  (STANDING):  insulin lispro (HumaLOG) corrective regimen sliding scale   SubCutaneous every 6 hours  dextrose 5%. 1000 milliLiter(s) (50 mL/Hr) IV Continuous <Continuous>  dextrose 50% Injectable 25 Gram(s) IV Push once  sodium chloride 0.9% lock flush 20 milliLiter(s) IV Push once  cyanocobalamin Injectable 1000 MICROGram(s) SubCutaneous every 7 days  dextrose 50% Injectable 12.5 Gram(s) IV Push once  heparin  Injectable 7500 Unit(s) SubCutaneous every 8 hours  propofol Infusion 1.662 MICROgram(s)/kG/Min (1 mL/Hr) IV Continuous <Continuous>  nystatin Powder 1 Application(s) Topical two times a day  pantoprazole  Injectable 40 milliGRAM(s) IV Push daily  linezolid  IVPB 600 milliGRAM(s) IV Intermittent every 12 hours  fentaNYL   Infusion 0.101 MICROgram(s)/kG/Hr (1 mL/Hr) IV Continuous <Continuous>  calcitriol Injectable 1 MICROGram(s) IV Push <User Schedule>  fluconAZOLE IVPB 400 milliGRAM(s) IV Intermittent every 24 hours  chlorhexidine 0.12% Liquid 15 milliLiter(s) Swish and Spit two times a day  Parenteral Nutrition - Adult 1 Each (65 mL/Hr) TPN Continuous <Continuous>  meropenem IVPB   IV Intermittent   meropenem IVPB 1000 milliGRAM(s) IV Intermittent every 8 hours  Parenteral Nutrition - Adult 1 Each (67 mL/Hr) TPN Continuous <Continuous>  fat emulsion 20% Infusion 50 Gram(s) (20.83 mL/Hr) IV Continuous <Continuous>    MEDICATIONS  (PRN):  ondansetron Injectable 4 milliGRAM(s) IV Push every 6 hours PRN Nausea  sodium chloride 0.9% lock flush 10 milliLiter(s) IV Push every 1 hour PRN After each medication administration  sodium chloride 0.9% lock flush 10 milliLiter(s) IV Push every 12 hours PRN Lumen of catheter NOT used      CENTRAL LINE: YES - L subclavian (8/10-)             Remove: NO           Indication: TPN & venous access     LI: YES             Remove: NO           Indication: UOP monitoring in critically ill patient    A-LINE: NO     Vital Signs Last 24 Hrs  T(C): 37.9 (11 Aug 2017 14:00), Max: 38.7 (10 Aug 2017 21:01)  T(F): 100.3 (11 Aug 2017 14:00), Max: 101.7 (10 Aug 2017 21:01)  HR: 74 (11 Aug 2017 14:) (66 - 100)  BP: 100/53 (11 Aug 2017 14:00) (91/54 - 145/91)  BP(mean): 73 (11 Aug 2017 14:00) (61 - 112)  RR: 16 (11 Aug 2017 14:00) (10 - 21)  SpO2: 97% (11 Aug 2017 14:00) (96% - 100%)    Mode: CPAP with PS  FiO2: 40  PEEP: 5  PS: 8  PIP: 13    ABG - ( 10 Aug 2017 15:20 )  pH: 7.44  /  pCO2: 44    /  pO2: 106   / HCO3: 29    / Base Excess: 4.8   /  SaO2: 98          I&O's Detail    10 Aug 2017 07:01  -  11 Aug 2017 07:00  --------------------------------------------------------  IN:    Fat Emulsion 20%: 91.3 mL    fentaNYL  Infusion: 144 mL    IV PiggyBack: 600 mL    propofol Infusion: 132.5 mL    TPN (Total Parenteral Nutrition): 1415.5 mL  Total IN: 2383.3 mL    OUT:    Bulb: 11 mL    Bulb: 75 mL    Bulb: 30 mL    Bulb: 14 mL    Drain: 135 mL    Indwelling Catheter - Urethral: 3100 mL    VAC (Vacuum Assisted Closure) System: 265 mL  Total OUT: 3630 mL    Total NET: -1246.7 mL      11 Aug 2017 07:01  -  11 Aug 2017 14:37  --------------------------------------------------------  IN:    fentaNYL  Infusion: 18 mL    IV PiggyBack: 300 mL    TPN (Total Parenteral Nutrition): 387 mL  Total IN: 705 mL    OUT:    Bulb: 30 mL    Bulb: 10 mL    Bulb: 4 mL    Drain: 60 mL    Indwelling Catheter - Urethral: 570 mL    VAC (Vacuum Assisted Closure) System: 135 mL  Total OUT: 809 mL    Total NET: -104 mL    PHYSICAL EXAM:  NEURO: Intubated, arousable, NAD  HEENT: PERRL, MMM  CV: RRR  PULM: Intubated, breath sounds improved from prior exams  ABD: Softly distended, TTP. LLQ malecot drain to gravity- bilious output (expected as drain is within bowel lumen); LLQ & LUQ CHECO drains- minimal SS.   : Li- clear yellow urine.   EXTR: WWP. Diffuse edema.  Vasc: 2+ radial, 1+ DP pulses  MSK: No joint swelling.   SKIN: Fungal-appearing rash beneath panniculus much improved.     LABS:                        7.1    15.6  )-----------( 377      ( 11 Aug 2017 05:31 )             23.1     141  |  100  |  27<H>  ----------------------------<  131<H>  4.0   |  29  |  0.80    Ca    8.8      11 Aug 2017 05:31  Phos  3.2     08-11  Mg     2.1     08-11    TPro  5.5<L>  /  Alb  1.9<L>  /  TBili  0.8  /  DBili  x   /  AST  14  /  ALT  11  /  AlkPhos  167<H>  08-11    PT/INR - ( 10 Aug 2017 03:12 )   PT: 13.1 sec;   INR: 1.18          PTT - ( 10 Aug 2017 03:12 )  PTT:30.9 sec  Urinalysis Basic - ( 11 Aug 2017 05:31 )    Color: Yellow / Appearance: x / S.015 / pH: x  Gluc: x / Ketone: NEGATIVE  / Bili: NEGATIVE / Urobili: 0.2 E.U./dL   Blood: x / Protein: NEGATIVE mg/dL / Nitrite: NEGATIVE   Leuk Esterase: NEGATIVE / RBC: > 10 /HPF / WBC < 5 /HPF   Sq Epi: x / Non Sq Epi: Few /HPF / Bacteria: Present /HPF SICU DAILY PROGRESS NOTE    INTERVAL HPI / OVERNIGHT EVENTS:   : Per ID, stopped Zosyn, started meropenem 1q8, continued Zyvox. duplex ordered   O/N: [facility] central line removed. got lasix 20 at 7pm, Temp 101.9 at 9pm, bld cx sent, UA ordered, sputum cx ordered, SBP dropped to 80s at 1am, improved to 100's when propofol is off. ROS not obtainable.  8/10: Planned RTOR- Leak noted from previous anastomosis site on left side, mid-abdomen. Malecot drain placed in enterotomy. CHECO x 2 placed, one in LUQ and one in LLQ. Necrotic fascia debrided, pulse irrigated. Vicryl mesh sutured to fascia circumferentially. Xeroform gauze placed on top of mesh, and vac dressing applied over this.       MEDICATIONS  (STANDING):  insulin lispro (HumaLOG) corrective regimen sliding scale   SubCutaneous every 6 hours  dextrose 5%. 1000 milliLiter(s) (50 mL/Hr) IV Continuous <Continuous>  dextrose 50% Injectable 25 Gram(s) IV Push once  sodium chloride 0.9% lock flush 20 milliLiter(s) IV Push once  cyanocobalamin Injectable 1000 MICROGram(s) SubCutaneous every 7 days  dextrose 50% Injectable 12.5 Gram(s) IV Push once  heparin  Injectable 7500 Unit(s) SubCutaneous every 8 hours  propofol Infusion 1.662 MICROgram(s)/kG/Min (1 mL/Hr) IV Continuous <Continuous>  nystatin Powder 1 Application(s) Topical two times a day  pantoprazole  Injectable 40 milliGRAM(s) IV Push daily  linezolid  IVPB 600 milliGRAM(s) IV Intermittent every 12 hours  fentaNYL   Infusion 0.101 MICROgram(s)/kG/Hr (1 mL/Hr) IV Continuous <Continuous>  calcitriol Injectable 1 MICROGram(s) IV Push <User Schedule>  fluconAZOLE IVPB 400 milliGRAM(s) IV Intermittent every 24 hours  chlorhexidine 0.12% Liquid 15 milliLiter(s) Swish and Spit two times a day  Parenteral Nutrition - Adult 1 Each (65 mL/Hr) TPN Continuous <Continuous>  meropenem IVPB   IV Intermittent   meropenem IVPB 1000 milliGRAM(s) IV Intermittent every 8 hours  Parenteral Nutrition - Adult 1 Each (67 mL/Hr) TPN Continuous <Continuous>  fat emulsion 20% Infusion 50 Gram(s) (20.83 mL/Hr) IV Continuous <Continuous>    MEDICATIONS  (PRN):  ondansetron Injectable 4 milliGRAM(s) IV Push every 6 hours PRN Nausea  sodium chloride 0.9% lock flush 10 milliLiter(s) IV Push every 1 hour PRN After each medication administration  sodium chloride 0.9% lock flush 10 milliLiter(s) IV Push every 12 hours PRN Lumen of catheter NOT used      CENTRAL LINE: YES - L subclavian (8/10-)             Remove: NO           Indication: TPN & venous access     LI: YES             Remove: NO           Indication: UOP monitoring in critically ill patient    A-LINE: NO     Vital Signs Last 24 Hrs  T(C): 37.9 (11 Aug 2017 14:00), Max: 38.7 (10 Aug 2017 21:01)  T(F): 100.3 (11 Aug 2017 14:00), Max: 101.7 (10 Aug 2017 21:01)  HR: 74 (11 Aug 2017 14:) (66 - 100)  BP: 100/53 (11 Aug 2017 14:00) (91/54 - 145/91)  BP(mean): 73 (11 Aug 2017 14:00) (61 - 112)  RR: 16 (11 Aug 2017 14:00) (10 - 21)  SpO2: 97% (11 Aug 2017 14:00) (96% - 100%)    Mode: CPAP with PS  FiO2: 40  PEEP: 5  PS: 8  PIP: 13    ABG - ( 10 Aug 2017 15:20 )  pH: 7.44  /  pCO2: 44    /  pO2: 106   / HCO3: 29    / Base Excess: 4.8   /  SaO2: 98          I&O's Detail    10 Aug 2017 07:01  -  11 Aug 2017 07:00  --------------------------------------------------------  IN:    Fat Emulsion 20%: 91.3 mL    fentaNYL  Infusion: 144 mL    IV PiggyBack: 600 mL    propofol Infusion: 132.5 mL    TPN (Total Parenteral Nutrition): 1415.5 mL  Total IN: 2383.3 mL    OUT:    Bulb: 11 mL    Bulb: 75 mL    Bulb: 30 mL    Bulb: 14 mL    Drain: 135 mL    Indwelling Catheter - Urethral: 3100 mL    VAC (Vacuum Assisted Closure) System: 265 mL  Total OUT: 3630 mL    Total NET: -1246.7 mL      11 Aug 2017 07:01  -  11 Aug 2017 14:37  --------------------------------------------------------  IN:    fentaNYL  Infusion: 18 mL    IV PiggyBack: 300 mL    TPN (Total Parenteral Nutrition): 387 mL  Total IN: 705 mL    OUT:    Bulb: 30 mL    Bulb: 10 mL    Bulb: 4 mL    Drain: 60 mL    Indwelling Catheter - Urethral: 570 mL    VAC (Vacuum Assisted Closure) System: 135 mL  Total OUT: 809 mL    Total NET: -104 mL    PHYSICAL EXAM:  NEURO: Intubated, arousable, NAD  HEENT: PERRL, MMM  CV: RRR  PULM: Intubated, breath sounds improved from prior exams  ABD: Softly distended, TTP. LLQ malecot drain to gravity- bilious output (expected as drain is within bowel lumen); LLQ & LUQ CHECO drains- minimal SS.   : Li- clear yellow urine.   EXTR: WWP. Diffuse edema.  Vasc: 2+ radial, 1+ DP pulses  MSK: No joint swelling.   SKIN: Fungal-appearing rash beneath panniculus much improved.     LABS:                        7.1    15.6  )-----------( 377      ( 11 Aug 2017 05:31 )             23.1     141  |  100  |  27<H>  ----------------------------<  131<H>  4.0   |  29  |  0.80    Ca    8.8      11 Aug 2017 05:31  Phos  3.2     08-11  Mg     2.1     08-11    TPro  5.5<L>  /  Alb  1.9<L>  /  TBili  0.8  /  DBili  x   /  AST  14  /  ALT  11  /  AlkPhos  167<H>  08-11    PT/INR - ( 10 Aug 2017 03:12 )   PT: 13.1 sec;   INR: 1.18          PTT - ( 10 Aug 2017 03:12 )  PTT:30.9 sec  Urinalysis Basic - ( 11 Aug 2017 05:31 )    Color: Yellow / Appearance: x / S.015 / pH: x  Gluc: x / Ketone: NEGATIVE  / Bili: NEGATIVE / Urobili: 0.2 E.U./dL   Blood: x / Protein: NEGATIVE mg/dL / Nitrite: NEGATIVE   Leuk Esterase: NEGATIVE / RBC: > 10 /HPF / WBC < 5 /HPF   Sq Epi: x / Non Sq Epi: Few /HPF / Bacteria: Present /HPF

## 2017-08-11 NOTE — PROGRESS NOTE ADULT - SUBJECTIVE AND OBJECTIVE BOX
INTERVAL HPI/OVERNIGHT EVENTS:    Patient seen and examined at bedside.  Febrile overnight.  No pressors    Fi02:  40%      ANTIBIOTICS/RELEVANT:    MEDICATIONS  (STANDING):  insulin lispro (HumaLOG) corrective regimen sliding scale   SubCutaneous Before meals and at bedtime  dextrose 5%. 1000 milliLiter(s) (50 mL/Hr) IV Continuous <Continuous>  dextrose 50% Injectable 25 Gram(s) IV Push once  sodium chloride 0.9% lock flush 20 milliLiter(s) IV Push once  cyanocobalamin Injectable 1000 MICROGram(s) SubCutaneous every 7 days  dextrose 50% Injectable 12.5 Gram(s) IV Push once  heparin  Injectable 7500 Unit(s) SubCutaneous every 8 hours  fat emulsion 20% Infusion 50 Gram(s) (20.8 mL/Hr) IV Continuous <Continuous>  propofol Infusion 1.662 MICROgram(s)/kG/Min (1 mL/Hr) IV Continuous <Continuous>  piperacillin/tazobactam IVPB. 3.375 Gram(s) IV Intermittent every 6 hours  nystatin Powder 1 Application(s) Topical two times a day  pantoprazole  Injectable 40 milliGRAM(s) IV Push daily  linezolid  IVPB 600 milliGRAM(s) IV Intermittent every 12 hours  fentaNYL   Infusion 0.101 MICROgram(s)/kG/Hr (1 mL/Hr) IV Continuous <Continuous>  calcitriol Injectable 1 MICROGram(s) IV Push <User Schedule>  fluconAZOLE IVPB 400 milliGRAM(s) IV Intermittent every 24 hours  chlorhexidine 0.12% Liquid 15 milliLiter(s) Swish and Spit two times a day  Parenteral Nutrition - Adult 1 Each (65 mL/Hr) TPN Continuous <Continuous>    MEDICATIONS  (PRN):  ondansetron Injectable 4 milliGRAM(s) IV Push every 6 hours PRN Nausea  sodium chloride 0.9% lock flush 10 milliLiter(s) IV Push every 1 hour PRN After each medication administration  sodium chloride 0.9% lock flush 10 milliLiter(s) IV Push every 12 hours PRN Lumen of catheter NOT used        Vital Signs Last 24 Hrs  T(C): 37.8 (11 Aug 2017 09:00), Max: 38.7 (10 Aug 2017 21:01)  T(F): 100 (11 Aug 2017 09:00), Max: 101.7 (10 Aug 2017 21:01)  HR: 72 (11 Aug 2017 09:00) (66 - 100)  BP: 92/52 (11 Aug 2017 09:00) (92/48 - 145/91)  BP(mean): 68 (11 Aug 2017 09:00) (61 - 112)  RR: 19 (11 Aug 2017 09:00) (10 - 19)  SpO2: 100% (11 Aug 2017 09:00) (94% - 100%)    PHYSICAL EXAM:  Constitutional:obese, intubatedEyes:CL, EOMI  Ear/Nose/Throat: intubated on ventilator  Neck:no JVD, no lymphadenopathy, supple  Respiratory: CTA eileen  Cardiovascular: S1S2 RRR, no murmurs  Gastrointestinal:soft, large wound vac in place; multiple surgical drains with bloody fluid, (+) pus  Extremities:no e/e/c  Vascular: DP Pulse:	right normal; left normal      LABS:                        7.1    15.6  )-----------( 377      ( 11 Aug 2017 05:31 )             23.1     11 % bands on CBC from 8/10    08-11    141  |  100  |  27<H>  ----------------------------<  131<H>  4.0   |  29  |  0.80    Ca    8.8      11 Aug 2017 05:31  Phos  3.2       Mg     2.1         TPro  5.5<L>  /  Alb  1.9<L>  /  TBili  0.8  /  DBili  x   /  AST  14  /  ALT  11  /  AlkPhos  167<H>  0811    PT/INR - ( 10 Aug 2017 03:12 )   PT: 13.1 sec;   INR: 1.18          PTT - ( 10 Aug 2017 03:12 )  PTT:30.9 sec  Urinalysis Basic - ( 11 Aug 2017 05:31 )    Color: Yellow / Appearance: x / S.015 / pH: x  Gluc: x / Ketone: NEGATIVE  / Bili: NEGATIVE / Urobili: 0.2 E.U./dL   Blood: x / Protein: NEGATIVE mg/dL / Nitrite: NEGATIVE   Leuk Esterase: NEGATIVE / RBC: > 10 /HPF / WBC < 5 /HPF   Sq Epi: x / Non Sq Epi: Few /HPF / Bacteria: Present /HPF        MICROBIOLOGY:  Culture - Surgical Swab (08.10.17 @ 15:25)    Specimen Source: .Surgical Swab None    Culture Results:   Rare Gram Negative Rods  Identification and susceptibility to follow.        RADIOLOGY & ADDITIONAL STUDIES:  CXR ():  There has been interval removal left IJV catheter. The   remainder the central lines and support catheters are unchanged in   position. Persistent left-sided pleural effusion. Underlying pneumonia   and/or atelectasis cannot be excluded. There is limited evaluation of the   cardia mediastinal silhouette and heart size due to the rotated portable   technique.

## 2017-08-11 NOTE — PROGRESS NOTE ADULT - ASSESSMENT
57 F s/p  SBR resection, fistula resection, esophagojejunostomy revision w/ post-op course complicated by RTOR for distal anastomosis breakdown, ATN, acute respiratory failure, & septic shock.     Neuro: Propofol gtt, Fentanyl gtt  CV: MAP 65  Resp: AC//40/12/5  GI/FEN: NPO, TPN/lipids, Vit B12, Vit D, Protonix  : Yu for I/O monitoring in critically ill pt; BUN improving  Endo: ISS  ID: anastamotic leak: fluconazole (8/7--), linezolid (7/25-8/1; 8/2--), meropenem (8/11--) // D/c Perioperative Gent/Vanc, colistin (7/29-8/1), vanc (8/1-8/2), micafungin (7/29-8/7), zosyn (8/1-11)  Heme/PPX: SCDs, SQH  Lines: L subclavian TLC (8/10--), Radha (7/24-8/8), L basilic vein PICC (7/25-8/8), L IJ TLC (7/30-8/10)  Drains: RLQ mediastinal CASEY, LLQ CASEY. LUQ casey, Malencot in enterotomy site and Wound vac to midline  Wounds: Midline xiphoid to pubis incision; DTI & stage 2 pressure ulcer noted. 57 F s/p  SBR resection, fistula resection, esophagojejunostomy revision w/ post-op course complicated by RTOR for distal anastomosis breakdown, ATN, acute respiratory failure, & septic shock.     Neuro: Propofol gtt, Fentanyl gtt; to try to D/C  CV: MAP 65, holding further diuresis for now with lower BP.  Resp: AC//40/12/5, to try CPAP  GI/FEN: NPO, TPN/lipids, Vit B12, Vit D, Protonix  : Yu for I/O monitoring in critically ill pt; BUN improving  Endo: ISS  ID: anastamotic leak: fluconazole (8/7--), linezolid (7/25-8/1; 8/2--), meropenem (8/11--) // D/c Perioperative Gent/Vanc, colistin (7/29-8/1), vanc (8/1-8/2), micafungin (7/29-8/7), zosyn (8/1-11)  Heme/PPX: SCDs, SQH  Lines: L subclavian TLC (8/10--), Radha (7/24-8/8), L basilic vein PICC (7/25-8/8), L IJ TLC (7/30-8/10)  Drains: RLQ mediastinal CASEY, LLQ CASEY. LUQ casey, Malencot in enterotomy site and Wound vac to midline  Wounds: Midline xiphoid to pubis incision; DTI & stage 2 pressure ulcer noted.

## 2017-08-11 NOTE — PROGRESS NOTE ADULT - ASSESSMENT
IMP:  Abdominal wound infection.  Still febrile with increased WBC.  Bands 11%.  Fever work up pending.  There has been no response to zosyn    Recommend:  1.  Continue linezolid for now  2.  Stop Zosyn.  Start Meropenem 1 gram IV q8hrs for improved gram negative coverage  3.  Follow up identity of gram negative can in surgical wound culture  4.  Follow up blood cultures

## 2017-08-12 LAB
-  AMPICILLIN/SULBACTAM: SIGNIFICANT CHANGE UP
-  AMPICILLIN: SIGNIFICANT CHANGE UP
-  CEFAZOLIN: SIGNIFICANT CHANGE UP
-  CEFTRIAXONE: SIGNIFICANT CHANGE UP
-  CIPROFLOXACIN: SIGNIFICANT CHANGE UP
-  GENTAMICIN: SIGNIFICANT CHANGE UP
-  PIPERACILLIN/TAZOBACTAM: SIGNIFICANT CHANGE UP
-  TOBRAMYCIN: SIGNIFICANT CHANGE UP
-  TRIMETHOPRIM/SULFAMETHOXAZOLE: SIGNIFICANT CHANGE UP
ANION GAP SERPL CALC-SCNC: 10 MMOL/L — SIGNIFICANT CHANGE UP (ref 5–17)
APTT BLD: 133 SEC — CRITICAL HIGH (ref 27.5–37.4)
APTT BLD: 175.1 SEC — CRITICAL HIGH (ref 27.5–37.4)
APTT BLD: 178 SEC — CRITICAL HIGH (ref 27.5–37.4)
APTT BLD: 27.8 SEC — SIGNIFICANT CHANGE UP (ref 27.5–37.4)
BUN SERPL-MCNC: 24 MG/DL — HIGH (ref 7–23)
CALCIUM SERPL-MCNC: 8.8 MG/DL — SIGNIFICANT CHANGE UP (ref 8.4–10.5)
CHLORIDE SERPL-SCNC: 100 MMOL/L — SIGNIFICANT CHANGE UP (ref 96–108)
CO2 SERPL-SCNC: 26 MMOL/L — SIGNIFICANT CHANGE UP (ref 22–31)
CREAT SERPL-MCNC: 0.7 MG/DL — SIGNIFICANT CHANGE UP (ref 0.5–1.3)
GLUCOSE SERPL-MCNC: 135 MG/DL — HIGH (ref 70–99)
GRAM STN FLD: SIGNIFICANT CHANGE UP
HCT VFR BLD CALC: 19.1 % — CRITICAL LOW (ref 34.5–45)
HCT VFR BLD CALC: 19.4 % — CRITICAL LOW (ref 34.5–45)
HCT VFR BLD CALC: 24.6 % — LOW (ref 34.5–45)
HCT VFR BLD CALC: 25.4 % — LOW (ref 34.5–45)
HGB BLD-MCNC: 6 G/DL — CRITICAL LOW (ref 11.5–15.5)
HGB BLD-MCNC: 6.1 G/DL — CRITICAL LOW (ref 11.5–15.5)
HGB BLD-MCNC: 8 G/DL — LOW (ref 11.5–15.5)
HGB BLD-MCNC: 8.4 G/DL — LOW (ref 11.5–15.5)
INR BLD: 1.19 — HIGH (ref 0.88–1.16)
MAGNESIUM SERPL-MCNC: 2 MG/DL — SIGNIFICANT CHANGE UP (ref 1.6–2.6)
MCHC RBC-ENTMCNC: 28.6 PG — SIGNIFICANT CHANGE UP (ref 27–34)
MCHC RBC-ENTMCNC: 28.7 PG — SIGNIFICANT CHANGE UP (ref 27–34)
MCHC RBC-ENTMCNC: 29 PG — SIGNIFICANT CHANGE UP (ref 27–34)
MCHC RBC-ENTMCNC: 29.6 PG — SIGNIFICANT CHANGE UP (ref 27–34)
MCHC RBC-ENTMCNC: 31.4 G/DL — LOW (ref 32–36)
MCHC RBC-ENTMCNC: 31.4 G/DL — LOW (ref 32–36)
MCHC RBC-ENTMCNC: 32.5 G/DL — SIGNIFICANT CHANGE UP (ref 32–36)
MCHC RBC-ENTMCNC: 33.1 G/DL — SIGNIFICANT CHANGE UP (ref 32–36)
MCV RBC AUTO: 86.7 FL — SIGNIFICANT CHANGE UP (ref 80–100)
MCV RBC AUTO: 89.1 FL — SIGNIFICANT CHANGE UP (ref 80–100)
MCV RBC AUTO: 91.1 FL — SIGNIFICANT CHANGE UP (ref 80–100)
MCV RBC AUTO: 94.1 FL — SIGNIFICANT CHANGE UP (ref 80–100)
METHOD TYPE: SIGNIFICANT CHANGE UP
PHOSPHATE SERPL-MCNC: 2 MG/DL — LOW (ref 2.5–4.5)
PLATELET # BLD AUTO: 298 K/UL — SIGNIFICANT CHANGE UP (ref 150–400)
PLATELET # BLD AUTO: 323 K/UL — SIGNIFICANT CHANGE UP (ref 150–400)
PLATELET # BLD AUTO: 339 K/UL — SIGNIFICANT CHANGE UP (ref 150–400)
PLATELET # BLD AUTO: 345 K/UL — SIGNIFICANT CHANGE UP (ref 150–400)
POTASSIUM SERPL-MCNC: 3.8 MMOL/L — SIGNIFICANT CHANGE UP (ref 3.5–5.3)
POTASSIUM SERPL-SCNC: 3.8 MMOL/L — SIGNIFICANT CHANGE UP (ref 3.5–5.3)
PROTHROM AB SERPL-ACNC: 13.2 SEC — HIGH (ref 9.8–12.7)
RBC # BLD: 2.03 M/UL — LOW (ref 3.8–5.2)
RBC # BLD: 2.13 M/UL — LOW (ref 3.8–5.2)
RBC # BLD: 2.76 M/UL — LOW (ref 3.8–5.2)
RBC # BLD: 2.93 M/UL — LOW (ref 3.8–5.2)
RBC # FLD: 15.3 % — SIGNIFICANT CHANGE UP (ref 10.3–16.9)
RBC # FLD: 15.8 % — SIGNIFICANT CHANGE UP (ref 10.3–16.9)
RBC # FLD: 16.5 % — SIGNIFICANT CHANGE UP (ref 10.3–16.9)
RBC # FLD: 16.8 % — SIGNIFICANT CHANGE UP (ref 10.3–16.9)
SODIUM SERPL-SCNC: 136 MMOL/L — SIGNIFICANT CHANGE UP (ref 135–145)
SPECIMEN SOURCE: SIGNIFICANT CHANGE UP
WBC # BLD: 11.7 K/UL — HIGH (ref 3.8–10.5)
WBC # BLD: 12.8 K/UL — HIGH (ref 3.8–10.5)
WBC # BLD: 12.9 K/UL — HIGH (ref 3.8–10.5)
WBC # BLD: 16.7 K/UL — HIGH (ref 3.8–10.5)
WBC # FLD AUTO: 11.7 K/UL — HIGH (ref 3.8–10.5)
WBC # FLD AUTO: 12.8 K/UL — HIGH (ref 3.8–10.5)
WBC # FLD AUTO: 12.9 K/UL — HIGH (ref 3.8–10.5)
WBC # FLD AUTO: 16.7 K/UL — HIGH (ref 3.8–10.5)

## 2017-08-12 PROCEDURE — 99232 SBSQ HOSP IP/OBS MODERATE 35: CPT

## 2017-08-12 PROCEDURE — 99291 CRITICAL CARE FIRST HOUR: CPT

## 2017-08-12 PROCEDURE — 71010: CPT | Mod: 26

## 2017-08-12 RX ORDER — MEROPENEM 1 G/30ML
1000 INJECTION INTRAVENOUS EVERY 8 HOURS
Qty: 0 | Refills: 0 | Status: DISCONTINUED | OUTPATIENT
Start: 2017-08-12 | End: 2017-08-20

## 2017-08-12 RX ORDER — ELECTROLYTE SOLUTION,INJ
1 VIAL (ML) INTRAVENOUS
Qty: 0 | Refills: 0 | Status: DISCONTINUED | OUTPATIENT
Start: 2017-08-12 | End: 2017-08-12

## 2017-08-12 RX ORDER — HYDROMORPHONE HYDROCHLORIDE 2 MG/ML
0.5 INJECTION INTRAMUSCULAR; INTRAVENOUS; SUBCUTANEOUS EVERY 4 HOURS
Qty: 0 | Refills: 0 | Status: DISCONTINUED | OUTPATIENT
Start: 2017-08-12 | End: 2017-08-17

## 2017-08-12 RX ORDER — ACETAMINOPHEN 500 MG
1000 TABLET ORAL ONCE
Qty: 0 | Refills: 0 | Status: COMPLETED | OUTPATIENT
Start: 2017-08-12 | End: 2017-08-16

## 2017-08-12 RX ORDER — ACETAMINOPHEN 500 MG
1000 TABLET ORAL ONCE
Qty: 0 | Refills: 0 | Status: COMPLETED | OUTPATIENT
Start: 2017-08-12 | End: 2017-08-12

## 2017-08-12 RX ORDER — HEPARIN SODIUM 5000 [USP'U]/ML
1500 INJECTION INTRAVENOUS; SUBCUTANEOUS
Qty: 25000 | Refills: 0 | Status: DISCONTINUED | OUTPATIENT
Start: 2017-08-12 | End: 2017-08-12

## 2017-08-12 RX ORDER — HEPARIN SODIUM 5000 [USP'U]/ML
1200 INJECTION INTRAVENOUS; SUBCUTANEOUS
Qty: 25000 | Refills: 0 | Status: DISCONTINUED | OUTPATIENT
Start: 2017-08-12 | End: 2017-08-12

## 2017-08-12 RX ORDER — HYDROMORPHONE HYDROCHLORIDE 2 MG/ML
1 INJECTION INTRAMUSCULAR; INTRAVENOUS; SUBCUTANEOUS EVERY 4 HOURS
Qty: 0 | Refills: 0 | Status: DISCONTINUED | OUTPATIENT
Start: 2017-08-12 | End: 2017-08-17

## 2017-08-12 RX ORDER — FENTANYL CITRATE 50 UG/ML
0.1 INJECTION INTRAVENOUS
Qty: 2500 | Refills: 0 | Status: DISCONTINUED | OUTPATIENT
Start: 2017-08-12 | End: 2017-08-14

## 2017-08-12 RX ORDER — PROPOFOL 10 MG/ML
1.68 INJECTION, EMULSION INTRAVENOUS
Qty: 1000 | Refills: 0 | Status: DISCONTINUED | OUTPATIENT
Start: 2017-08-12 | End: 2017-08-14

## 2017-08-12 RX ORDER — I.V. FAT EMULSION 20 G/100ML
50 EMULSION INTRAVENOUS
Qty: 0 | Refills: 0 | Status: DISCONTINUED | OUTPATIENT
Start: 2017-08-12 | End: 2017-08-12

## 2017-08-12 RX ADMIN — MEROPENEM 100 MILLIGRAM(S): 1 INJECTION INTRAVENOUS at 11:33

## 2017-08-12 RX ADMIN — Medication 1 EACH: at 18:29

## 2017-08-12 RX ADMIN — NYSTATIN CREAM 1 APPLICATION(S): 100000 CREAM TOPICAL at 18:30

## 2017-08-12 RX ADMIN — CHLORHEXIDINE GLUCONATE 15 MILLILITER(S): 213 SOLUTION TOPICAL at 18:29

## 2017-08-12 RX ADMIN — MEROPENEM 100 MILLIGRAM(S): 1 INJECTION INTRAVENOUS at 19:21

## 2017-08-12 RX ADMIN — MEROPENEM 200 MILLIGRAM(S): 1 INJECTION INTRAVENOUS at 03:00

## 2017-08-12 RX ADMIN — LINEZOLID 300 MILLIGRAM(S): 600 INJECTION, SOLUTION INTRAVENOUS at 06:30

## 2017-08-12 RX ADMIN — CHLORHEXIDINE GLUCONATE 15 MILLILITER(S): 213 SOLUTION TOPICAL at 06:29

## 2017-08-12 RX ADMIN — I.V. FAT EMULSION 20.8 GRAM(S): 20 EMULSION INTRAVENOUS at 22:01

## 2017-08-12 RX ADMIN — Medication 400 MILLIGRAM(S): at 08:30

## 2017-08-12 RX ADMIN — LINEZOLID 300 MILLIGRAM(S): 600 INJECTION, SOLUTION INTRAVENOUS at 18:29

## 2017-08-12 RX ADMIN — NYSTATIN CREAM 1 APPLICATION(S): 100000 CREAM TOPICAL at 06:41

## 2017-08-12 RX ADMIN — PANTOPRAZOLE SODIUM 40 MILLIGRAM(S): 20 TABLET, DELAYED RELEASE ORAL at 11:33

## 2017-08-12 NOTE — PROGRESS NOTE ADULT - ATTENDING COMMENTS
Pt with increased RR on [id number] and PS increased to 8 with decreased RR and increased TV. Bleeding from CHECO's noted as above with drop in HB and Heparin gtt held. 2 units of PRBC's ordered and repeat CBC and coags pending. Dr. Roach notified. continue abx and f/u with ID regarding abx coverage for stenotrophamonas with persistent fevers. Continue TPN. continue plans per surgery. Pt with increased RR on [id number] and PS increased to 8 with decreased RR and increased TV. Bleeding from CHECO's noted as above with drop in HB and Heparin gtt held. 2 units of PRBC's ordered and repeat CBC and coags pending. Dr. Roach notified. continue abx and f/u with ID regarding abx coverage for candida albicans with persistent fevers. Continue TPN. continue plans per surgery.

## 2017-08-12 NOTE — PROGRESS NOTE ADULT - ASSESSMENT
IMP:  Intraabdominal infection.  Patient still with low grade fevers, WBC decreasing.  GNR on all pending tissue cultures    Recommend:  1.  Continue linezolid and meropenem  2.  Can stop fluconazole as patient has been on antifungals > 14 days and has no fungal growth on new cultures  3.  Follow up identity of gram negative can

## 2017-08-12 NOTE — PROGRESS NOTE ADULT - SUBJECTIVE AND OBJECTIVE BOX
INTERVAL HPI/OVERNIGHT EVENTS:    No events.  No pressors.  Patient with low grade fevers     CONSTITUTIONAL:  Negative fever or chills, feels well, good appetite  EYES:  Negative  blurry vision or double vision  CARDIOVASCULAR:  Negative for chest pain or palpitations  RESPIRATORY:  Negative for cough, wheezing, or SOB   GASTROINTESTINAL:  Negative for nausea, vomiting, diarrhea, constipation, or abdominal pain  GENITOURINARY:  Negative frequency, urgency or dysuria  NEUROLOGIC:  No headache, confusion, dizziness, lightheadedness      ANTIBIOTICS/RELEVANT:    MEDICATIONS  (STANDING):  insulin lispro (HumaLOG) corrective regimen sliding scale   SubCutaneous every 6 hours  dextrose 5%. 1000 milliLiter(s) (50 mL/Hr) IV Continuous <Continuous>  dextrose 50% Injectable 25 Gram(s) IV Push once  sodium chloride 0.9% lock flush 20 milliLiter(s) IV Push once  cyanocobalamin Injectable 1000 MICROGram(s) SubCutaneous every 7 days  dextrose 50% Injectable 12.5 Gram(s) IV Push once  propofol Infusion 1.662 MICROgram(s)/kG/Min (1 mL/Hr) IV Continuous <Continuous>  nystatin Powder 1 Application(s) Topical two times a day  pantoprazole  Injectable 40 milliGRAM(s) IV Push daily  linezolid  IVPB 600 milliGRAM(s) IV Intermittent every 12 hours  calcitriol Injectable 1 MICROGram(s) IV Push <User Schedule>  chlorhexidine 0.12% Liquid 15 milliLiter(s) Swish and Spit two times a day  Parenteral Nutrition - Adult 1 Each (67 mL/Hr) TPN Continuous <Continuous>  fentaNYL   Infusion 0.101 MICROgram(s)/kG/Hr (1 mL/Hr) IV Continuous <Continuous>  Parenteral Nutrition - Adult 1 Each (67 mL/Hr) TPN Continuous <Continuous>  fat emulsion 20% Infusion 50 Gram(s) (20 mL/Hr) IV Continuous <Continuous>  acetaminophen  IVPB 1000 milliGRAM(s) IV Intermittent once  meropenem IVPB 1000 milliGRAM(s) IV Intermittent every 8 hours    MEDICATIONS  (PRN):  ondansetron Injectable 4 milliGRAM(s) IV Push every 6 hours PRN Nausea  sodium chloride 0.9% lock flush 10 milliLiter(s) IV Push every 1 hour PRN After each medication administration  sodium chloride 0.9% lock flush 10 milliLiter(s) IV Push every 12 hours PRN Lumen of catheter NOT used  acetaminophen  IVPB 1000 milliGRAM(s) IV Intermittent once PRN For Temp greater than 38.5 C (101.3 F)        Vital Signs Last 24 Hrs  T(C): 38.3 (12 Aug 2017 09:44), Max: 38.3 (12 Aug 2017 09:44)  T(F): 100.9 (12 Aug 2017 09:44), Max: 100.9 (12 Aug 2017 09:44)  HR: 88 (12 Aug 2017 11:00) (72 - 92)  BP: 111/60 (12 Aug 2017 10:00) (91/54 - 151/72)  BP(mean): 75 (12 Aug 2017 10:00) (68 - 96)  RR: 24 (12 Aug 2017 11:00) (7 - 34)  SpO2: 97% (12 Aug 2017 11:00) (96% - 100%)    PHYSICAL EXAM:  Constitutional: intubated  Eyes: no icterus  Ear/Nose/Throat: ET tube in place	  Respiratory: CTA eileen  Cardiovascular: S1S2 RRR, no murmurs  Gastrointestinal:soft, large wound vac; abdomen soft  neuro:  sedated    LABS:                        6.0    11.7  )-----------( 345      ( 12 Aug 2017 06:23 )             19.1     08-12    136  |  100  |  24<H>  ----------------------------<  135<H>  3.8   |  26  |  0.70    Ca    8.8      12 Aug 2017 05:24  Phos  2.0     08-12  Mg     2.0     08-12    TPro  5.5<L>  /  Alb  1.9<L>  /  TBili  0.8  /  DBili  x   /  AST  14  /  ALT  11  /  AlkPhos  167<H>  08-11    PTT - ( 12 Aug 2017 05:24 )  PTT:133.0 sec  Urinalysis Basic - ( 11 Aug 2017 05:31 )    Color: Yellow / Appearance: x / S.015 / pH: x  Gluc: x / Ketone: NEGATIVE  / Bili: NEGATIVE / Urobili: 0.2 E.U./dL   Blood: x / Protein: NEGATIVE mg/dL / Nitrite: NEGATIVE   Leuk Esterase: NEGATIVE / RBC: > 10 /HPF / WBC < 5 /HPF   Sq Epi: x / Non Sq Epi: Few /HPF / Bacteria: Present /HPF        MICROBIOLOGY:  Culture - Sputum . (17 @ 11:26)    Gram Stain:   Rare epithelial cells  Rare White blood cells  Rare Gram Negative Rods    Specimen Source: .Sputum Sputum    Culture Results:   Normal Respiratory Lisa present  Culture in progress    Culture - Tissue with Gram Stain (08.10.17 @ 15:25)    Gram Stain:   No organisms seen  Few WBC's    Specimen Source: .Tissue abdominal fascia    Culture Results:   Rare Gram Negative Rods  Identification and susceptibility to follow.      RADIOLOGY & ADDITIONAL STUDIES:  ortable examination the chest demonstrates endotracheal tube in   satisfactory position. Congestion and/or infiltrates. Left effusion. Left   subclavian line noted with tip overlying superior vena cava.    Impression: Congestion and/or infiltrates. Left effusion. Endotracheal   tube in satisfactory position. Limited inspiration.

## 2017-08-12 NOTE — PROGRESS NOTE ADULT - SUBJECTIVE AND OBJECTIVE BOX
SUBJECTIVE:   O/N:  drawn from central line where heparin was running in; repeat  drawn from leg; held heparin gtt x 1hr & will decrease to 15cc/hr; 630am  thus holding heparin x 1 hr & will decrease to 12cc/hr. AM hgb 6.1 from 7.1; repeat hgb sent stat. Low-grade fevers o/n; Tm 100.6.   : Per ID, stopped Zosyn, started meropenem 1q8, continued Zyvox. duplex ordered : + Rt axillary to subclavian dvt starting heparin gtt f/u ptt@11pm    Vital Signs Last 24 Hrs  T(C): 38.3 (12 Aug 2017 09:44), Max: 38.3 (12 Aug 2017 09:44)  T(F): 100.9 (12 Aug 2017 09:44), Max: 100.9 (12 Aug 2017 09:44)  HR: 88 (12 Aug 2017 11:00) (72 - 92)  BP: 111/60 (12 Aug 2017 10:00) (91/54 - 151/72)  BP(mean): 75 (12 Aug 2017 10:00) (68 - 96)  RR: 24 (12 Aug 2017 11:00) (7 - 34)  SpO2: 97% (12 Aug 2017 11:00) (96% - 100%)    PHYSICAL EXAM    Gen: NAD  CV: Tachycardic RR  Pulm: Intubated  Abd: Soft, distended, midline VAC in place to good suction. Left sided CASEY x2 with sanguinous output. [place] Drain to gravity and putting out bilious material  Ext: WWP, bilateral edema noted      LABS:                        6.0    11.7  )-----------( 345      ( 12 Aug 2017 06:23 )             19.1     08-12    136  |  100  |  24<H>  ----------------------------<  135<H>  3.8   |  26  |  0.70    Ca    8.8      12 Aug 2017 05:24  Phos  2.0     08-12  Mg     2.0     08-12    TPro  5.5<L>  /  Alb  1.9<L>  /  TBili  0.8  /  DBili  x   /  AST  14  /  ALT  11  /  AlkPhos  167<H>  08-11    PTT - ( 12 Aug 2017 05:24 )  PTT:133.0 sec  Urinalysis Basic - ( 11 Aug 2017 05:31 )    Color: Yellow / Appearance: x / S.015 / pH: x  Gluc: x / Ketone: NEGATIVE  / Bili: NEGATIVE / Urobili: 0.2 E.U./dL   Blood: x / Protein: NEGATIVE mg/dL / Nitrite: NEGATIVE   Leuk Esterase: NEGATIVE / RBC: > 10 /HPF / WBC < 5 /HPF   Sq Epi: x / Non Sq Epi: Few /HPF / Bacteria: Present /HPF      ASSESSMENT AND PLAN  57 F s/p  SBR resection, fistula resection, esophagojejunostomy revision w/ post-op course complicated by RTOR for distal anastomosis breakdown, ATN, acute respiratory failure, & septic shock.     Neuro: Pain control PRN  CV: MAP 65, holding further diuresis for now with lower BP.  Resp: AC//40/12/5, to try CPAP  GI/FEN: NPO, TPN/lipids, Vit B12, Vit D, Protonix  : Yu for I/O monitoring in critically ill pt; BUN improving  Endo: ISS  ID: anastamotic leak: linezolid (-; --), meropenem (--) // D/c Perioperative Gent/Vanc, colistin (-), vanc (-), micafungin (-), zosyn (-), fluconazole (-)  Heme/PPX: SCDs, SQH  Lines: L subclavian TLC (8/10--), Radha (-), L basilic vein PICC (-), L IJ TLC (-8/10)  Drains: RLQ mediastinal CASEY, LLQ CASEY. LUQ casey, Malencot in enterotomy site and Wound vac to midline  Wounds: Midline xiphoid to pubis incision; DTI & stage 2 pressure ulcer noted. SUBJECTIVE:   O/N:  drawn from central line where heparin was running in; repeat  drawn from leg; held heparin gtt x 1hr & will decrease to 15cc/hr; 630am  thus holding heparin x 1 hr & will decrease to 12cc/hr. AM hgb 6.1 from 7.1; repeat hgb sent stat. Low-grade fevers o/n; Tm 100.6.   11: Per ID, stopped Zosyn, started meropenem 1q8, continued Zyvox. duplex ordered : + Rt axillary to subclavian dvt starting heparin gtt f/u ptt@11pm    Vital Signs Last 24 Hrs  T(C): 38.3 (12 Aug 2017 09:44), Max: 38.3 (12 Aug 2017 09:44)  T(F): 100.9 (12 Aug 2017 09:44), Max: 100.9 (12 Aug 2017 09:44)  HR: 88 (12 Aug 2017 11:00) (72 - 92)  BP: 111/60 (12 Aug 2017 10:00) (91/54 - 151/72)  BP(mean): 75 (12 Aug 2017 10:00) (68 - 96)  RR: 24 (12 Aug 2017 11:00) (7 - 34)  SpO2: 97% (12 Aug 2017 11:00) (96% - 100%)    PHYSICAL EXAM    Gen: NAD, arousable, responsive  CV: Tachycardic RR  Pulm: Intubated, coarse breath sounds noted bilaterally  Abd: Soft, distended, midline VAC in place to good suction. Left sided CASEY x2 with sanguinous output. [place] Drain to gravity and putting out bilious material  Ext: WWP, bilateral edema noted      LABS:                        6.0    11.7  )-----------( 345      ( 12 Aug 2017 06:23 )             19.1     08-12    136  |  100  |  24<H>  ----------------------------<  135<H>  3.8   |  26  |  0.70    Ca    8.8      12 Aug 2017 05:24  Phos  2.0     08-12  Mg     2.0     08-12    TPro  5.5<L>  /  Alb  1.9<L>  /  TBili  0.8  /  DBili  x   /  AST  14  /  ALT  11  /  AlkPhos  167<H>      PTT - ( 12 Aug 2017 05:24 )  PTT:133.0 sec  Urinalysis Basic - ( 11 Aug 2017 05:31 )    Color: Yellow / Appearance: x / S.015 / pH: x  Gluc: x / Ketone: NEGATIVE  / Bili: NEGATIVE / Urobili: 0.2 E.U./dL   Blood: x / Protein: NEGATIVE mg/dL / Nitrite: NEGATIVE   Leuk Esterase: NEGATIVE / RBC: > 10 /HPF / WBC < 5 /HPF   Sq Epi: x / Non Sq Epi: Few /HPF / Bacteria: Present /HPF      ASSESSMENT AND PLAN  57 F s/p  SBR resection, fistula resection, esophagojejunostomy revision w/ post-op course complicated by RTOR for distal anastomosis breakdown, ATN, acute respiratory failure, & septic shock.     Neuro: Pain control PRN  CV: MAP 65, holding further diuresis for now with lower BP.  Resp: AC//40/12/5, to try CPAP  GI/FEN: NPO, TPN/lipids, Vit B12, Vit D, Protonix  : Yu for I/O monitoring in critically ill pt; BUN improving  Endo: ISS  ID: anastamotic leak: linezolid (-; --), meropenem (--) // D/c Perioperative Gent/Vanc, colistin (-), vanc (-), micafungin (-), zosyn (-), fluconazole (-)  Heme/PPX: SCDs, SQH  Lines: L subclavian TLC (8/10--), Radha (-), L basilic vein PICC (-), L IJ TLC (-8/10)  Drains: RLQ mediastinal CASEY, LLQ CASEY. LUQ casey, Malencot in enterotomy site and Wound vac to midline  Wounds: Midline xiphoid to pubis incision; DTI & stage 2 pressure ulcer noted.

## 2017-08-13 LAB
ANION GAP SERPL CALC-SCNC: 8 MMOL/L — SIGNIFICANT CHANGE UP (ref 5–17)
BUN SERPL-MCNC: 26 MG/DL — HIGH (ref 7–23)
CALCIUM SERPL-MCNC: 8.6 MG/DL — SIGNIFICANT CHANGE UP (ref 8.4–10.5)
CHLORIDE SERPL-SCNC: 104 MMOL/L — SIGNIFICANT CHANGE UP (ref 96–108)
CO2 SERPL-SCNC: 26 MMOL/L — SIGNIFICANT CHANGE UP (ref 22–31)
CREAT SERPL-MCNC: 0.7 MG/DL — SIGNIFICANT CHANGE UP (ref 0.5–1.3)
CULTURE RESULTS: SIGNIFICANT CHANGE UP
GLUCOSE SERPL-MCNC: 125 MG/DL — HIGH (ref 70–99)
HCT VFR BLD CALC: 23.6 % — LOW (ref 34.5–45)
HCT VFR BLD CALC: 23.8 % — LOW (ref 34.5–45)
HGB BLD-MCNC: 7.6 G/DL — LOW (ref 11.5–15.5)
HGB BLD-MCNC: 7.6 G/DL — LOW (ref 11.5–15.5)
MAGNESIUM SERPL-MCNC: 2.1 MG/DL — SIGNIFICANT CHANGE UP (ref 1.6–2.6)
MCHC RBC-ENTMCNC: 28.6 PG — SIGNIFICANT CHANGE UP (ref 27–34)
MCHC RBC-ENTMCNC: 28.9 PG — SIGNIFICANT CHANGE UP (ref 27–34)
MCHC RBC-ENTMCNC: 31.9 G/DL — LOW (ref 32–36)
MCHC RBC-ENTMCNC: 32.2 G/DL — SIGNIFICANT CHANGE UP (ref 32–36)
MCV RBC AUTO: 89.5 FL — SIGNIFICANT CHANGE UP (ref 80–100)
MCV RBC AUTO: 89.7 FL — SIGNIFICANT CHANGE UP (ref 80–100)
ORGANISM # SPEC MICROSCOPIC CNT: SIGNIFICANT CHANGE UP
PHOSPHATE SERPL-MCNC: 2.4 MG/DL — LOW (ref 2.5–4.5)
PLATELET # BLD AUTO: 317 K/UL — SIGNIFICANT CHANGE UP (ref 150–400)
PLATELET # BLD AUTO: 326 K/UL — SIGNIFICANT CHANGE UP (ref 150–400)
POTASSIUM SERPL-MCNC: 4.1 MMOL/L — SIGNIFICANT CHANGE UP (ref 3.5–5.3)
POTASSIUM SERPL-SCNC: 4.1 MMOL/L — SIGNIFICANT CHANGE UP (ref 3.5–5.3)
RBC # BLD: 2.63 M/UL — LOW (ref 3.8–5.2)
RBC # BLD: 2.66 M/UL — LOW (ref 3.8–5.2)
RBC # FLD: 16.7 % — SIGNIFICANT CHANGE UP (ref 10.3–16.9)
RBC # FLD: 16.8 % — SIGNIFICANT CHANGE UP (ref 10.3–16.9)
SODIUM SERPL-SCNC: 138 MMOL/L — SIGNIFICANT CHANGE UP (ref 135–145)
SPECIMEN SOURCE: SIGNIFICANT CHANGE UP
WBC # BLD: 10 K/UL — SIGNIFICANT CHANGE UP (ref 3.8–10.5)
WBC # BLD: 11.2 K/UL — HIGH (ref 3.8–10.5)
WBC # FLD AUTO: 10 K/UL — SIGNIFICANT CHANGE UP (ref 3.8–10.5)
WBC # FLD AUTO: 11.2 K/UL — HIGH (ref 3.8–10.5)

## 2017-08-13 PROCEDURE — 71010: CPT | Mod: 26

## 2017-08-13 PROCEDURE — 99291 CRITICAL CARE FIRST HOUR: CPT

## 2017-08-13 RX ORDER — HEPARIN SODIUM 5000 [USP'U]/ML
5000 INJECTION INTRAVENOUS; SUBCUTANEOUS EVERY 8 HOURS
Qty: 0 | Refills: 0 | Status: DISCONTINUED | OUTPATIENT
Start: 2017-08-13 | End: 2017-09-11

## 2017-08-13 RX ORDER — FUROSEMIDE 40 MG
20 TABLET ORAL ONCE
Qty: 0 | Refills: 0 | Status: DISCONTINUED | OUTPATIENT
Start: 2017-08-13 | End: 2017-08-13

## 2017-08-13 RX ORDER — FLUCONAZOLE 150 MG/1
400 TABLET ORAL EVERY 24 HOURS
Qty: 0 | Refills: 0 | Status: DISCONTINUED | OUTPATIENT
Start: 2017-08-13 | End: 2017-08-15

## 2017-08-13 RX ORDER — FUROSEMIDE 40 MG
20 TABLET ORAL ONCE
Qty: 0 | Refills: 0 | Status: COMPLETED | OUTPATIENT
Start: 2017-08-13 | End: 2017-08-13

## 2017-08-13 RX ORDER — ELECTROLYTE SOLUTION,INJ
1 VIAL (ML) INTRAVENOUS
Qty: 0 | Refills: 0 | Status: DISCONTINUED | OUTPATIENT
Start: 2017-08-13 | End: 2017-08-13

## 2017-08-13 RX ORDER — I.V. FAT EMULSION 20 G/100ML
50 EMULSION INTRAVENOUS
Qty: 0 | Refills: 0 | Status: DISCONTINUED | OUTPATIENT
Start: 2017-08-13 | End: 2017-08-13

## 2017-08-13 RX ADMIN — FENTANYL CITRATE 1 MICROGRAM(S)/KG/HR: 50 INJECTION INTRAVENOUS at 21:19

## 2017-08-13 RX ADMIN — NYSTATIN CREAM 1 APPLICATION(S): 100000 CREAM TOPICAL at 06:27

## 2017-08-13 RX ADMIN — PANTOPRAZOLE SODIUM 40 MILLIGRAM(S): 20 TABLET, DELAYED RELEASE ORAL at 11:46

## 2017-08-13 RX ADMIN — Medication 1 EACH: at 17:53

## 2017-08-13 RX ADMIN — MEROPENEM 100 MILLIGRAM(S): 1 INJECTION INTRAVENOUS at 11:47

## 2017-08-13 RX ADMIN — Medication 20 MILLIGRAM(S): at 08:58

## 2017-08-13 RX ADMIN — MEROPENEM 100 MILLIGRAM(S): 1 INJECTION INTRAVENOUS at 17:56

## 2017-08-13 RX ADMIN — HEPARIN SODIUM 5000 UNIT(S): 5000 INJECTION INTRAVENOUS; SUBCUTANEOUS at 14:54

## 2017-08-13 RX ADMIN — NYSTATIN CREAM 1 APPLICATION(S): 100000 CREAM TOPICAL at 17:56

## 2017-08-13 RX ADMIN — HYDROMORPHONE HYDROCHLORIDE 1 MILLIGRAM(S): 2 INJECTION INTRAMUSCULAR; INTRAVENOUS; SUBCUTANEOUS at 06:30

## 2017-08-13 RX ADMIN — MEROPENEM 100 MILLIGRAM(S): 1 INJECTION INTRAVENOUS at 02:21

## 2017-08-13 RX ADMIN — Medication 20 MILLIGRAM(S): at 20:43

## 2017-08-13 RX ADMIN — LINEZOLID 300 MILLIGRAM(S): 600 INJECTION, SOLUTION INTRAVENOUS at 06:27

## 2017-08-13 RX ADMIN — LINEZOLID 300 MILLIGRAM(S): 600 INJECTION, SOLUTION INTRAVENOUS at 17:54

## 2017-08-13 RX ADMIN — CHLORHEXIDINE GLUCONATE 15 MILLILITER(S): 213 SOLUTION TOPICAL at 06:27

## 2017-08-13 RX ADMIN — HYDROMORPHONE HYDROCHLORIDE 1 MILLIGRAM(S): 2 INJECTION INTRAMUSCULAR; INTRAVENOUS; SUBCUTANEOUS at 08:08

## 2017-08-13 RX ADMIN — I.V. FAT EMULSION 20.8 GRAM(S): 20 EMULSION INTRAVENOUS at 22:01

## 2017-08-13 RX ADMIN — FLUCONAZOLE 100 MILLIGRAM(S): 150 TABLET ORAL at 08:55

## 2017-08-13 RX ADMIN — CHLORHEXIDINE GLUCONATE 15 MILLILITER(S): 213 SOLUTION TOPICAL at 17:54

## 2017-08-13 RX ADMIN — HEPARIN SODIUM 5000 UNIT(S): 5000 INJECTION INTRAVENOUS; SUBCUTANEOUS at 21:38

## 2017-08-13 NOTE — PROGRESS NOTE ADULT - SUBJECTIVE AND OBJECTIVE BOX
SUBJECTIVE: Pt seen and examined at bedside. No overnight events. Remains hemodynamically stable, continuous low grade fevers. Intubated, sedated. Good UOP. Received 2 u pRBC yesterday for Hgb 6 with good response. Heparin gtt was stopped.    Plan for VAC change tomorrow.     Vital Signs Last 24 Hrs  T(C): 37.7 (13 Aug 2017 10:54), Max: 38.4 (12 Aug 2017 14:18)  T(F): 99.9 (13 Aug 2017 10:54), Max: 101.2 (12 Aug 2017 16:44)  HR: 80 (13 Aug 2017 11:00) (69 - 99)  BP: 101/59 (13 Aug 2017 11:00) (72/57 - 123/68)  BP(mean): 74 (13 Aug 2017 11:00) (68 - 95)  RR: 12 (13 Aug 2017 11:00) (0 - 30)  SpO2: 97% (13 Aug 2017 11:00) (74% - 100%)    PHYSICAL EXAM  Gen: In no acute distress, alert, following commands  CV: nomrocardic, normotensive  Pulm: Intubated, coarse breath sounds bilaterally  Abd: Soft, distended, midline VAC in place to good suction. Left sided CASEY x2 with ss output. [place] Drain to gravity and putting out bilious material  Ext: WWP,1+ edema      LABS:                        7.6    11.2  )-----------( 326      ( 13 Aug 2017 06:11 )             23.8     08-13    138  |  104  |  26<H>  ----------------------------<  125<H>  4.1   |  26  |  0.70    Ca    8.6      13 Aug 2017 06:11  Phos  2.4     08-13  Mg     2.1     08-13      PT/INR - ( 12 Aug 2017 16:11 )   PT: 13.2 sec;   INR: 1.19          PTT - ( 12 Aug 2017 16:11 )  PTT:27.8 sec      ASSESSMENT AND PLAN  57 F s/p  SBR resection, fistula resection, esophagojejunostomy revision w/ post-op course complicated by RTOR for distal anastomosis breakdown, ATN, acute respiratory failure, & septic shock.     Neuro: Propofol gtt, Fentanyl gtt  CV: MAP 65  Resp: AC//40/12/5  GI/FEN: NPO, TPN/lipids, Vit B12, Vit D, Protonix  : Yu for I/O monitoring in critically ill pt; BUN improving  Endo: ISS  ID: anastamotic leak: fluconazole (8/7--), linezolid (7/25-8/1; 8/2--), meropenem (8/11--) // D/c Perioperative Gent/Vanc, colistin (7/29-8/1), vanc (8/1-8/2), micafungin (7/29-8/7), zosyn (8/1-11)  Heme/PPX: SCDs, SQH  Lines: L subclavian TLC (8/10--), Radha (7/24-8/8), L basilic vein PICC (7/25-8/8), L IJ TLC (7/30-8/10)  Drains: RLQ mediastinal CASEY, LLQ CASEY. LUQ casey, Malencot in enterotomy site and Wound vac to midline  Wounds: Midline xiphoid to pubis incision; DTI & stage 2 pressure ulcer noted. SUBJECTIVE: Pt seen and examined at bedside. No overnight events. Remains hemodynamically stable, continuous low grade fevers. Intubated, sedated. Good UOP. Received 2 u pRBC yesterday for Hgb 6 with good response. Heparin gtt was stopped.    Plan for VAC change tomorrow.     Vital Signs Last 24 Hrs  T(C): 37.7 (13 Aug 2017 10:54), Max: 38.4 (12 Aug 2017 14:18)  T(F): 99.9 (13 Aug 2017 10:54), Max: 101.2 (12 Aug 2017 16:44)  HR: 80 (13 Aug 2017 11:00) (69 - 99)  BP: 101/59 (13 Aug 2017 11:00) (72/57 - 123/68)  BP(mean): 74 (13 Aug 2017 11:00) (68 - 95)  RR: 12 (13 Aug 2017 11:00) (0 - 30)  SpO2: 97% (13 Aug 2017 11:00) (74% - 100%)    PHYSICAL EXAM  Gen: In no acute distress, alert, following commands  CV: nomrocardic, normotensive  Pulm: Intubated, coarse breath sounds bilaterally  Abd: Soft, distended, midline VAC in place to good suction. Left sided CASEY x2 with ss output. [place] Drain to gravity and putting out bilious material  Ext: WWP,1+ edema      LABS:                        7.6    11.2  )-----------( 326      ( 13 Aug 2017 06:11 )             23.8     08-13    138  |  104  |  26<H>  ----------------------------<  125<H>  4.1   |  26  |  0.70    Ca    8.6      13 Aug 2017 06:11  Phos  2.4     08-13  Mg     2.1     08-13      PT/INR - ( 12 Aug 2017 16:11 )   PT: 13.2 sec;   INR: 1.19          PTT - ( 12 Aug 2017 16:11 )  PTT:27.8 sec      ASSESSMENT AND PLAN  57 F s/p  SBR resection, fistula resection, esophagojejunostomy revision w/ post-op course complicated by RTOR for distal anastomosis breakdown, ATN, acute respiratory failure, & septic shock.     Neuro: Propofol gtt, Dilaudid PRN, Tylenol PRN  CV: MAP 65  Resp: AC//40/12/5  GI/FEN: NPO, TPN/lipids, Vit B12, Vit D, Protonix  : Yu for I/O monitoring in critically ill pt; BUN improving  Endo: ISS  ID: anastamotic leak: fluconazole (8/7--), linezolid (7/25-8/1; 8/2--), meropenem (8/11--) // D/c Perioperative Gent/Vanc, colistin (7/29-8/1), vanc (8/1-8/2), micafungin (7/29-8/7), zosyn (8/1-11)  Heme/PPX: SCDs  Lines: L subclavian TLC (8/10--), [place] (7/24-8/8), L basilic vein PICC (7/25-8/8), L IJ TLC (7/30-8/10)  Drains: RLQ mediastinal CASEY, LLQ CASEY. LUQ casey, Malencot in enterotomy site and Wound vac to midline  Wounds: Midline xiphoid to pubis incision; DTI & stage 2 pressure ulcer noted.

## 2017-08-13 NOTE — PROGRESS NOTE ADULT - ATTENDING COMMENTS
Hb stable and remains off anticoagulation. Pt with known prior Right axillary  thrombus. Some sanguinous drainage in CHECO's but decreased. Awake and placed on CPAP/PS. F/U with surgery timeline for need for return to OR for wound closure. F/U Cx with intermittent temps and continue micafungin for prior intrabdominal cultures with C. Glabrata. continue TPN and resume DVT prophylaxis.  DANNI Nettles has acted as my scribe.

## 2017-08-14 LAB
-  AZTREONAM: SIGNIFICANT CHANGE UP
-  CEFTAZIDIME: SIGNIFICANT CHANGE UP
-  CIPROFLOXACIN: SIGNIFICANT CHANGE UP
-  GENTAMICIN: SIGNIFICANT CHANGE UP
-  IMIPENEM: SIGNIFICANT CHANGE UP
-  PIPERACILLIN/TAZOBACTAM: SIGNIFICANT CHANGE UP
-  TOBRAMYCIN: SIGNIFICANT CHANGE UP
ANION GAP SERPL CALC-SCNC: 10 MMOL/L — SIGNIFICANT CHANGE UP (ref 5–17)
BUN SERPL-MCNC: 24 MG/DL — HIGH (ref 7–23)
CALCIUM SERPL-MCNC: 8.8 MG/DL — SIGNIFICANT CHANGE UP (ref 8.4–10.5)
CHLORIDE SERPL-SCNC: 100 MMOL/L — SIGNIFICANT CHANGE UP (ref 96–108)
CO2 SERPL-SCNC: 29 MMOL/L — SIGNIFICANT CHANGE UP (ref 22–31)
CREAT SERPL-MCNC: 0.7 MG/DL — SIGNIFICANT CHANGE UP (ref 0.5–1.3)
GLUCOSE SERPL-MCNC: 124 MG/DL — HIGH (ref 70–99)
HCT VFR BLD CALC: 23.6 % — LOW (ref 34.5–45)
HGB BLD-MCNC: 7.7 G/DL — LOW (ref 11.5–15.5)
MAGNESIUM SERPL-MCNC: 2.1 MG/DL — SIGNIFICANT CHANGE UP (ref 1.6–2.6)
MCHC RBC-ENTMCNC: 28.8 PG — SIGNIFICANT CHANGE UP (ref 27–34)
MCHC RBC-ENTMCNC: 32.6 G/DL — SIGNIFICANT CHANGE UP (ref 32–36)
MCV RBC AUTO: 88.4 FL — SIGNIFICANT CHANGE UP (ref 80–100)
METHOD TYPE: SIGNIFICANT CHANGE UP
PHOSPHATE SERPL-MCNC: 3.8 MG/DL — SIGNIFICANT CHANGE UP (ref 2.5–4.5)
PLATELET # BLD AUTO: 329 K/UL — SIGNIFICANT CHANGE UP (ref 150–400)
POTASSIUM SERPL-MCNC: 3.9 MMOL/L — SIGNIFICANT CHANGE UP (ref 3.5–5.3)
POTASSIUM SERPL-SCNC: 3.9 MMOL/L — SIGNIFICANT CHANGE UP (ref 3.5–5.3)
RBC # BLD: 2.67 M/UL — LOW (ref 3.8–5.2)
RBC # FLD: 16.9 % — SIGNIFICANT CHANGE UP (ref 10.3–16.9)
SODIUM SERPL-SCNC: 139 MMOL/L — SIGNIFICANT CHANGE UP (ref 135–145)
TRIGL SERPL-MCNC: 158 MG/DL — HIGH (ref 10–149)
WBC # BLD: 8.7 K/UL — SIGNIFICANT CHANGE UP (ref 3.8–10.5)
WBC # FLD AUTO: 8.7 K/UL — SIGNIFICANT CHANGE UP (ref 3.8–10.5)

## 2017-08-14 PROCEDURE — 99233 SBSQ HOSP IP/OBS HIGH 50: CPT | Mod: GC

## 2017-08-14 PROCEDURE — 97606 NEG PRS WND THER DME>50 SQCM: CPT

## 2017-08-14 PROCEDURE — 99232 SBSQ HOSP IP/OBS MODERATE 35: CPT

## 2017-08-14 PROCEDURE — 71010: CPT | Mod: 26

## 2017-08-14 RX ORDER — FUROSEMIDE 40 MG
20 TABLET ORAL ONCE
Qty: 0 | Refills: 0 | Status: COMPLETED | OUTPATIENT
Start: 2017-08-14 | End: 2017-08-14

## 2017-08-14 RX ORDER — ELECTROLYTE SOLUTION,INJ
1 VIAL (ML) INTRAVENOUS
Qty: 0 | Refills: 0 | Status: DISCONTINUED | OUTPATIENT
Start: 2017-08-14 | End: 2017-08-14

## 2017-08-14 RX ORDER — I.V. FAT EMULSION 20 G/100ML
50 EMULSION INTRAVENOUS
Qty: 0 | Refills: 0 | Status: DISCONTINUED | OUTPATIENT
Start: 2017-08-14 | End: 2017-08-14

## 2017-08-14 RX ADMIN — HYDROMORPHONE HYDROCHLORIDE 1 MILLIGRAM(S): 2 INJECTION INTRAMUSCULAR; INTRAVENOUS; SUBCUTANEOUS at 22:07

## 2017-08-14 RX ADMIN — MEROPENEM 100 MILLIGRAM(S): 1 INJECTION INTRAVENOUS at 03:34

## 2017-08-14 RX ADMIN — NYSTATIN CREAM 1 APPLICATION(S): 100000 CREAM TOPICAL at 18:01

## 2017-08-14 RX ADMIN — HEPARIN SODIUM 5000 UNIT(S): 5000 INJECTION INTRAVENOUS; SUBCUTANEOUS at 06:20

## 2017-08-14 RX ADMIN — PANTOPRAZOLE SODIUM 40 MILLIGRAM(S): 20 TABLET, DELAYED RELEASE ORAL at 11:54

## 2017-08-14 RX ADMIN — FLUCONAZOLE 100 MILLIGRAM(S): 150 TABLET ORAL at 07:03

## 2017-08-14 RX ADMIN — LINEZOLID 300 MILLIGRAM(S): 600 INJECTION, SOLUTION INTRAVENOUS at 05:30

## 2017-08-14 RX ADMIN — CHLORHEXIDINE GLUCONATE 15 MILLILITER(S): 213 SOLUTION TOPICAL at 06:20

## 2017-08-14 RX ADMIN — CALCITRIOL 1 MICROGRAM(S): 0.5 CAPSULE ORAL at 11:55

## 2017-08-14 RX ADMIN — I.V. FAT EMULSION 20.83 GRAM(S): 20 EMULSION INTRAVENOUS at 22:09

## 2017-08-14 RX ADMIN — NYSTATIN CREAM 1 APPLICATION(S): 100000 CREAM TOPICAL at 06:21

## 2017-08-14 RX ADMIN — HEPARIN SODIUM 5000 UNIT(S): 5000 INJECTION INTRAVENOUS; SUBCUTANEOUS at 16:04

## 2017-08-14 RX ADMIN — CHLORHEXIDINE GLUCONATE 15 MILLILITER(S): 213 SOLUTION TOPICAL at 18:01

## 2017-08-14 RX ADMIN — LINEZOLID 300 MILLIGRAM(S): 600 INJECTION, SOLUTION INTRAVENOUS at 18:01

## 2017-08-14 RX ADMIN — MEROPENEM 100 MILLIGRAM(S): 1 INJECTION INTRAVENOUS at 22:06

## 2017-08-14 RX ADMIN — HEPARIN SODIUM 5000 UNIT(S): 5000 INJECTION INTRAVENOUS; SUBCUTANEOUS at 22:06

## 2017-08-14 RX ADMIN — MEROPENEM 100 MILLIGRAM(S): 1 INJECTION INTRAVENOUS at 11:55

## 2017-08-14 RX ADMIN — Medication 20 MILLIGRAM(S): at 10:05

## 2017-08-14 RX ADMIN — HYDROMORPHONE HYDROCHLORIDE 1 MILLIGRAM(S): 2 INJECTION INTRAMUSCULAR; INTRAVENOUS; SUBCUTANEOUS at 20:22

## 2017-08-14 NOTE — PROGRESS NOTE ADULT - SUBJECTIVE AND OBJECTIVE BOX
appreciate Dr aquino taking care of this complex patient  after closure had bilious output through drain   originally wanted to see if drain could control but drained through the midline wound  dr aquino returned to OR on tuesday of last week and replaced the ab thera dressing removing retentions and mesh  on thursday returned and cannulated the fistula and and placed vicryl mesh  we changed dressing today and there is small area of bilious staining of mesh on left side  remainder is clean  mallicot drain is draining   on tpn  can remove right sided drain  would continue tpn and extubate  keep npo  with vac changes  hope that central area can close and allow the fistula to granulate around the tube  all bowel is matted and not candidate for anything more aggressive  believe can be extubated  no sign of sepsis

## 2017-08-14 NOTE — CHART NOTE - NSCHARTNOTEFT_GEN_A_CORE
Admitting Diagnosis:   57 F s/p  SBR resection, fistula resection, esophagojejunostomy revision w/ post-op course complicated by RTOR for distal anastomosis breakdown, ATN, acute respiratory failure, & septic shock.     PAST MEDICAL & SURGICAL HISTORY:  Reflux  Obesity  DVT (deep venous thrombosis): 2013 neck  Diverticulitis  Hypertension  Elective surgery: abodominal wall surgery  dec 2016  Elective surgery: NOV 2016 gastric bypass revision  Gastric bypass status for obesity: gastric sleeve, 6/2012  S/P breast biopsy: 2011, left  S/P colon resection: 2011  S/P knee surgery: repair 2009, 2011  right; left  Umbilical hernia: 2000  S/P appendectomy: 1975  S/P tonsillectomy: 1967    Current Nutrition Order: TPN: central line: 254 g D, 104 g AA, 50 g lipids, & 1858 mL fluid. Providing pt with 1770 kcal, 104 g protein, & 1858 mL fluid. GIR = 1.8    GI Issues: WDL    Pain: EMANUEL 2/2 vent.     Skin Integrity: Surgical incision    Labs:   08-14    139  |  100  |  24<H>  ----------------------------<  124<H>  3.9   |  29  |  0.70    Ca    8.8      14 Aug 2017 06:13  Phos  3.8     08-14  Mg     2.1     08-14      CAPILLARY BLOOD GLUCOSE  126 (14 Aug 2017 10:55)    Medications:  MEDICATIONS  (STANDING):  insulin lispro (HumaLOG) corrective regimen sliding scale   SubCutaneous every 6 hours  dextrose 5%. 1000 milliLiter(s) (50 mL/Hr) IV Continuous <Continuous>  dextrose 50% Injectable 25 Gram(s) IV Push once  sodium chloride 0.9% lock flush 20 milliLiter(s) IV Push once  cyanocobalamin Injectable 1000 MICROGram(s) SubCutaneous every 7 days  dextrose 50% Injectable 12.5 Gram(s) IV Push once  nystatin Powder 1 Application(s) Topical two times a day  pantoprazole  Injectable 40 milliGRAM(s) IV Push daily  linezolid  IVPB 600 milliGRAM(s) IV Intermittent every 12 hours  calcitriol Injectable 1 MICROGram(s) IV Push <User Schedule>  chlorhexidine 0.12% Liquid 15 milliLiter(s) Swish and Spit two times a day  meropenem IVPB 1000 milliGRAM(s) IV Intermittent every 8 hours  fluconAZOLE IVPB 400 milliGRAM(s) IV Intermittent every 24 hours  Parenteral Nutrition - Adult 1 Each (67 mL/Hr) TPN Continuous <Continuous>  heparin  Injectable 5000 Unit(s) SubCutaneous every 8 hours  fat emulsion 20% Infusion 50 Gram(s) (20.83 mL/Hr) IV Continuous <Continuous>  Parenteral Nutrition - Adult 1 Each (67 mL/Hr) TPN Continuous <Continuous>    MEDICATIONS  (PRN):  ondansetron Injectable 4 milliGRAM(s) IV Push every 6 hours PRN Nausea  sodium chloride 0.9% lock flush 10 milliLiter(s) IV Push every 1 hour PRN After each medication administration  sodium chloride 0.9% lock flush 10 milliLiter(s) IV Push every 12 hours PRN Lumen of catheter NOT used  acetaminophen  IVPB 1000 milliGRAM(s) IV Intermittent once PRN For Temp greater than 38.5 C (101.3 F)  HYDROmorphone  Injectable 1 milliGRAM(s) IV Push every 4 hours PRN Severe Pain (7 - 10)  HYDROmorphone  Injectable 0.5 milliGRAM(s) IV Push every 4 hours PRN Moderate Pain (4 - 6)      Weight:  No new weights to assess    Estimated energy needs using 52 kg IBW:  30-35 kcal/kg (8328-1884 kcal).   1.8-2 g/kg ( g protein).  30-35 mL/kg (4582-4821 mL fluid).    Subjective: N/A 2/2 vent.     Previous Nutrition Diagnosis:  Increased nutrient needs RT fistula & S/p surgery AEB 1.8-2 g/kg protein.     Active [ X  ]  Resolved [   ]    Goal: Continue to meet % of needs via TPN.     Recommendations: Continue TPN as ordered.     Education: N/A this visit 2/2 vent.     Risk Level: High [ X  ] Moderate [   ] Low [   ]

## 2017-08-14 NOTE — PROGRESS NOTE ADULT - ASSESSMENT
IMP:  sepsis secondary to intraabdominal infection +/- pneumonia.  WBC improving.      Recommend:  1.  Continue linezolid, meropenem, and fluconazole  2.  Follow up pending sputum and wound cultures

## 2017-08-14 NOTE — PROGRESS NOTE ADULT - SUBJECTIVE AND OBJECTIVE BOX
INTERVAL HPI/OVERNIGHT EVENTS:    Patient seen and examined.  No events.  No pressors      ANTIBIOTICS/RELEVANT:    MEDICATIONS  (STANDING):  insulin lispro (HumaLOG) corrective regimen sliding scale   SubCutaneous every 6 hours  dextrose 5%. 1000 milliLiter(s) (50 mL/Hr) IV Continuous <Continuous>  dextrose 50% Injectable 25 Gram(s) IV Push once  sodium chloride 0.9% lock flush 20 milliLiter(s) IV Push once  cyanocobalamin Injectable 1000 MICROGram(s) SubCutaneous every 7 days  dextrose 50% Injectable 12.5 Gram(s) IV Push once  nystatin Powder 1 Application(s) Topical two times a day  pantoprazole  Injectable 40 milliGRAM(s) IV Push daily  linezolid  IVPB 600 milliGRAM(s) IV Intermittent every 12 hours  calcitriol Injectable 1 MICROGram(s) IV Push <User Schedule>  chlorhexidine 0.12% Liquid 15 milliLiter(s) Swish and Spit two times a day  meropenem IVPB 1000 milliGRAM(s) IV Intermittent every 8 hours  fentaNYL   Infusion 0.101 MICROgram(s)/kG/Hr (1 mL/Hr) IV Continuous <Continuous>  propofol Infusion 1.675 MICROgram(s)/kG/Min (1 mL/Hr) IV Continuous <Continuous>  fluconAZOLE IVPB 400 milliGRAM(s) IV Intermittent every 24 hours  Parenteral Nutrition - Adult 1 Each (67 mL/Hr) TPN Continuous <Continuous>  heparin  Injectable 5000 Unit(s) SubCutaneous every 8 hours    MEDICATIONS  (PRN):  ondansetron Injectable 4 milliGRAM(s) IV Push every 6 hours PRN Nausea  sodium chloride 0.9% lock flush 10 milliLiter(s) IV Push every 1 hour PRN After each medication administration  sodium chloride 0.9% lock flush 10 milliLiter(s) IV Push every 12 hours PRN Lumen of catheter NOT used  acetaminophen  IVPB 1000 milliGRAM(s) IV Intermittent once PRN For Temp greater than 38.5 C (101.3 F)  HYDROmorphone  Injectable 1 milliGRAM(s) IV Push every 4 hours PRN Severe Pain (7 - 10)  HYDROmorphone  Injectable 0.5 milliGRAM(s) IV Push every 4 hours PRN Moderate Pain (4 - 6)        Vital Signs Last 24 Hrs  T(C): 37.9 (14 Aug 2017 05:45), Max: 37.9 (14 Aug 2017 05:45)  T(F): 100.2 (14 Aug 2017 05:45), Max: 100.2 (14 Aug 2017 05:45)  HR: 78 (14 Aug 2017 07:00) (66 - 80)  BP: 114/65 (14 Aug 2017 07:00) (100/59 - 130/74)  BP(mean): 85 (14 Aug 2017 07:00) (73 - 100)  RR: 14 (14 Aug 2017 07:00) (0 - 18)  SpO2: 99% (14 Aug 2017 07:00) (97% - 100%)    PHYSICAL EXAM:  Constitutional: intubated, ill appearing  Eyes:no icterus  Ear/Nose/Throat: ET tube in place	  Neck:no JVD, no lymphadenopathy, supple  Respiratory: moving air bilaterally  Cardiovascular: S1S2 RRR, no murmurs  Gastrointestinal:soft, large wound with wound vac in place, multiple surgical drains  Extremities:+ edema throughout        LABS:                        7.7    8.7   )-----------( 329      ( 14 Aug 2017 06:13 )             23.6     08-14    139  |  100  |  24<H>  ----------------------------<  124<H>  3.9   |  29  |  0.70    Ca    8.8      14 Aug 2017 06:13  Phos  3.8     08-14  Mg     2.1     08-14      PT/INR - ( 12 Aug 2017 16:11 )   PT: 13.2 sec;   INR: 1.19          PTT - ( 12 Aug 2017 16:11 )  PTT:27.8 sec      MICROBIOLOGY:  Culture - Sputum . (08.11.17 @ 11:26)    Gram Stain:   Rare epithelial cells  Rare White blood cells  Rare Gram Negative Rods    Specimen Source: .Sputum Sputum    Culture Results:   Rare Gram Negative Rods  Identification and susceptibility to follow.  Normal Respiratory Lisa absent    Culture - Blood (08.11.17 @ 08:36)    Specimen Source: .Blood Blood-Peripheral    Culture Results:   No growth at 2 days.    Culture - Tissue with Gram Stain (08.10.17 @ 15:25)    -  Ampicillin/Sulbactam: S <=8/4    -  Cefazolin: S <=8    -  Ciprofloxacin: S <=1    -  Tobramycin: S <=4    -  Trimethoprim/Sulfamethoxazole: S <=2/38    Gram Stain:   No organisms seen  Few WBC's    -  Ampicillin: S <=8    -  Ceftriaxone: S <=1    -  Gentamicin: S <=4    -  Piperacillin/Tazobactam: S <=16    Specimen Source: .Tissue abdominal fascia    Culture Results:   Rare Escherichia coli    Organism Identification: Escherichia coli    Organism: Escherichia coli    Method Type: ALISHA        RADIOLOGY & ADDITIONAL STUDIES:  CXR:  Congestion and/or infiltrates. Left effusion. Endotracheal   tube noted with tip just proximal to the tracheal bifurcation and should   be withdrawn 1 to 1.5  cm

## 2017-08-14 NOTE — PROGRESS NOTE ADULT - ASSESSMENT
57 F s/p  SBR resection, fistula resection, esophagojejunostomy revision w/ post-op course complicated by RTOR for distal anastomosis breakdown, ATN, acute respiratory failure, & septic shock.     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 unable to start hep 2* to bleeding.   Drains: RLQ mediastinal CASEY, LLQ CSAEY. LUQ casey, Malencot in enterotomy site and Wound vac to midline  Wounds: Midline xiphoid to pubis incision; DTI & stage 2 pressure ulcer noted. 57 F s/p  SBR resection, fistula resection, esophagojejunostomy revision w/ post-op course complicated by RTOR for distal anastomosis breakdown, ATN, acute respiratory failure,     Neuro: Propofol gtt, Fentanyl gtt  CV: MAP 65  Resp: AC//40/12/5, will try to wean after vac change today.  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 unable to start hep 2* to bleeding.   Drains: RLQ mediastinal CASEY, LLQ CASEY. LUQ casey, Malencot in enterotomy site and Wound vac to midline  Wounds: Midline xiphoid to pubis incision; DTI & stage 2 pressure ulcer noted.

## 2017-08-14 NOTE — PROGRESS NOTE ADULT - SUBJECTIVE AND OBJECTIVE BOX
SICU DAILY PROGRESS NOTE      INTERVAL HPI / OVERNIGHT EVENTS:   O/N: no events - area of blood collection in dressing unchanged, drain stripped  8/13: Hgbn drifted down to 7.8- given lasix 20 x1 in am repeat hgb @ 1:7.6 started sqh. Axillary dvt is chronic spoke with radiology -   O/N: 10PM H/H: 8.0/24.6 from 8.4/25.4. Left Upper CASEY now with bilious output  8/12: Hgb 6.0 - with increased bloody output from CASEY drains- given 2 u repeat hgb: 8.4  casey output decreased in afternoon repeat ptt/ INR: wnl repeat cbc @10  O/N:  drawn from central line where heparin was running in; repeat  drawn from leg; held heparin gtt x 1hr & will decrease to 15cc/hr; 630am  thus holding heparin x 1 hr & will decrease to 12cc/hr. AM hgb 6.1 from 7.1; repeat hgb sent stat. Low-grade fevers o/n; Tm 100.6.    MEDICATIONS:  ---NEURO-  ondansetron Injectable 4 milliGRAM(s) IV Push every 6 hours PRN  acetaminophen  IVPB 1000 milliGRAM(s) IV Intermittent once PRN  HYDROmorphone  Injectable 1 milliGRAM(s) IV Push every 4 hours PRN  HYDROmorphone  Injectable 0.5 milliGRAM(s) IV Push every 4 hours PRN  fentaNYL   Infusion 0.101 MICROgram(s)/kG/Hr IV Continuous <Continuous>  propofol Infusion 1.675 MICROgram(s)/kG/Min IV Continuous <Continuous>  ---GI/FEN-  pantoprazole  Injectable 40 milliGRAM(s) IV Push daily  dextrose 5%. 1000 milliLiter(s) IV Continuous <Continuous>  cyanocobalamin Injectable 1000 MICROGram(s) SubCutaneous every 7 days  calcitriol Injectable 1 MICROGram(s) IV Push <User Schedule>  Parenteral Nutrition - Adult 1 Each TPN Continuous <Continuous>  ---ID-   linezolid  IVPB 600 milliGRAM(s) IV Intermittent every 12 hours  meropenem IVPB 1000 milliGRAM(s) IV Intermittent every 8 hours  fluconAZOLE IVPB 400 milliGRAM(s) IV Intermittent every 24 hours  ---ENDO-  insulin lispro (HumaLOG) corrective regimen sliding scale   SubCutaneous every 6 hours  dextrose 50% Injectable 25 Gram(s) IV Push once  dextrose 50% Injectable 12.5 Gram(s) IV Push once  ---HEME-  heparin  Injectable 5000 Unit(s) SubCutaneous every 8 hours  ---OTHER-  sodium chloride 0.9% lock flush 10 milliLiter(s) IV Push every 1 hour PRN  sodium chloride 0.9% lock flush 10 milliLiter(s) IV Push every 12 hours PRN  sodium chloride 0.9% lock flush 20 milliLiter(s) IV Push once  nystatin Powder 1 Application(s) Topical two times a day  chlorhexidine 0.12% Liquid 15 milliLiter(s) Swish and Spit two times a day    CENTRAL LINE: YES - L subclavian (8/10-)             Remove: NO           Indication: TPN & venous access     LI: YES             Remove: NO           Indication: UOP monitoring in critically ill patient    A-LINE: NO    VOLUME STATUS:   I&O's Detail    13 Aug 2017 07:01  -  14 Aug 2017 07:00  --------------------------------------------------------  IN:    Fat Emulsion 20%: 271 mL    fentaNYL  Infusion: 51 mL    IV PiggyBack: 750 mL    propofol Infusion: 141 mL    TPN (Total Parenteral Nutrition): 1608 mL  Total IN: 2821 mL    OUT:    Bulb: 30 mL    Bulb: 115 mL    Bulb: 37 mL    Drain: 185 mL    Indwelling Catheter - Urethral: 4840 mL    VAC (Vacuum Assisted Closure) System: 100 mL  Total OUT: 5307 mL    Total NET: -2486 mL      VITALS:  ICU Vital Signs Last 24 Hrs  T(C): 37.9 (14 Aug 2017 05:45), Max: 37.9 (14 Aug 2017 05:45)  T(F): 100.2 (14 Aug 2017 05:45), Max: 100.2 (14 Aug 2017 05:45)  HR: 78 (14 Aug 2017 07:00) (66 - 80)  BP: 114/65 (14 Aug 2017 07:00) (100/59 - 130/74)  BP(mean): 85 (14 Aug 2017 07:00) (73 - 100)  ABP: --  ABP(mean): --  RR: 14 (14 Aug 2017 07:00) (0 - 18)  SpO2: 99% (14 Aug 2017 07:00) (97% - 100%)    Mode: AC/ CMV (Assist Control/ Continuous Mandatory Ventilation)  RR (machine): 12  TV (machine): 450  FiO2: 40  PEEP: 5  ITime: 1  MAP: 9  PIP: 23    PHYSICAL EXAM:  NEURO: Sedated, arousable, RASS -2; NAD  HEENT: PERRL, MMM  CV: RRR  PULM: Intubated, breath sounds improved from prior exams  ABD: Softly distended, less TTP. LLQ malecot drain to gravity- bilious output (expected as drain is within bowel lumen); LLQ & LUQ CASEY drains- minimal SS. Midline abd wound vac in place w/ good seal; collection of blood trapped under plastic again noted; SS output in canister.  : Li- clear yellow urine.   EXTR: WWP. Peripheral edema improving; RUE remains more swollen than LUE.  Vasc: 2+ radial, 1+ DP pulses  MSK: No joint swelling.   SKIN: Fungal-appearing rash beneath panniculus much improved.     LABS:                        7.7    8.7   )-----------( 329      ( 14 Aug 2017 06:13 )             23.6     139  |  100  |  24<H>  ----------------------------<  124<H>  3.9   |  29  |  0.70    Ca    8.8      14 Aug 2017 06:13  Phos  3.8     08-14  Mg     2.1     08-14    PT/INR - ( 12 Aug 2017 16:11 )   PT: 13.2 sec;   INR: 1.19     PTT - ( 12 Aug 2017 16:11 )  PTT:27.8 sec SICU DAILY PROGRESS NOTE      INTERVAL HPI / OVERNIGHT EVENTS:   O/N: no events - area of blood collection in dressing unchanged, drain stripped  8/13: Hgbn drifted down to 7.8- given lasix 20 x1 in am repeat hgb @ 1:7.6 started sqh. Axillary dvt is chronic spoke with radiology -   O/N: 10PM H/H: 8.0/24.6 from 8.4/25.4. Left Upper CASEY now with bilious output  8/12: Hgb 6.0 - with increased bloody output from CASEY drains- given 2 u repeat hgb: 8.4  casey output decreased in afternoon repeat ptt/ INR: wnl repeat cbc @10  O/N:  drawn from central line where heparin was running in; repeat  drawn from leg; held heparin gtt x 1hr & will decrease to 15cc/hr; 630am  thus holding heparin x 1 hr & will decrease to 12cc/hr. AM hgb 6.1 from 7.1; repeat hgb sent stat. Low-grade fevers o/n; Tm 100.6.    MEDICATIONS:  ---NEURO-  ondansetron Injectable 4 milliGRAM(s) IV Push every 6 hours PRN  acetaminophen  IVPB 1000 milliGRAM(s) IV Intermittent once PRN  HYDROmorphone  Injectable 1 milliGRAM(s) IV Push every 4 hours PRN  HYDROmorphone  Injectable 0.5 milliGRAM(s) IV Push every 4 hours PRN  fentaNYL   Infusion 0.101 MICROgram(s)/kG/Hr IV Continuous <Continuous>  propofol Infusion 1.675 MICROgram(s)/kG/Min IV Continuous <Continuous>  ---GI/FEN-  pantoprazole  Injectable 40 milliGRAM(s) IV Push daily  dextrose 5%. 1000 milliLiter(s) IV Continuous <Continuous>  cyanocobalamin Injectable 1000 MICROGram(s) SubCutaneous every 7 days  calcitriol Injectable 1 MICROGram(s) IV Push <User Schedule>  Parenteral Nutrition - Adult 1 Each TPN Continuous <Continuous>  ---ID-   linezolid  IVPB 600 milliGRAM(s) IV Intermittent every 12 hours  meropenem IVPB 1000 milliGRAM(s) IV Intermittent every 8 hours  fluconAZOLE IVPB 400 milliGRAM(s) IV Intermittent every 24 hours  ---ENDO-  insulin lispro (HumaLOG) corrective regimen sliding scale   SubCutaneous every 6 hours  dextrose 50% Injectable 25 Gram(s) IV Push once  dextrose 50% Injectable 12.5 Gram(s) IV Push once  ---HEME-  heparin  Injectable 5000 Unit(s) SubCutaneous every 8 hours  ---OTHER-  sodium chloride 0.9% lock flush 10 milliLiter(s) IV Push every 1 hour PRN  sodium chloride 0.9% lock flush 10 milliLiter(s) IV Push every 12 hours PRN  sodium chloride 0.9% lock flush 20 milliLiter(s) IV Push once  nystatin Powder 1 Application(s) Topical two times a day  chlorhexidine 0.12% Liquid 15 milliLiter(s) Swish and Spit two times a day    CENTRAL LINE: YES - L subclavian (8/10-)             Remove: NO           Indication: TPN & venous access     LI: YES             Remove: NO           Indication: UOP monitoring in critically ill patient    A-LINE: NO    VOLUME STATUS:   I&O's Detail    13 Aug 2017 07:01  -  14 Aug 2017 07:00  --------------------------------------------------------  IN:    Fat Emulsion 20%: 271 mL    fentaNYL  Infusion: 51 mL    IV PiggyBack: 750 mL    propofol Infusion: 141 mL    TPN (Total Parenteral Nutrition): 1608 mL  Total IN: 2821 mL    OUT:    Bulb: 30 mL    Bulb: 115 mL    Bulb: 37 mL    Drain: 185 mL    Indwelling Catheter - Urethral: 4840 mL    VAC (Vacuum Assisted Closure) System: 100 mL  Total OUT: 5307 mL    Total NET: -2486 mL      VITALS:  ICU Vital Signs Last 24 Hrs  T(C): 37.9 (14 Aug 2017 05:45), Max: 37.9 (14 Aug 2017 05:45)  T(F): 100.2 (14 Aug 2017 05:45), Max: 100.2 (14 Aug 2017 05:45)  HR: 78 (14 Aug 2017 07:00) (66 - 80)  BP: 114/65 (14 Aug 2017 07:00) (100/59 - 130/74)  BP(mean): 85 (14 Aug 2017 07:00) (73 - 100)  ABP: --  ABP(mean): --  RR: 14 (14 Aug 2017 07:00) (0 - 18)  SpO2: 99% (14 Aug 2017 07:00) (97% - 100%)    Mode: AC/ CMV (Assist Control/ Continuous Mandatory Ventilation)  RR (machine): 12  TV (machine): 450  FiO2: 40  PEEP: 5  ITime: 1  MAP: 9  PIP: 23    PHYSICAL EXAM:  NEURO: Sedated, arousable, RASS -2; NAD  HEENT: PERRL, MMM  CV: RRR  PULM: Intubated, CTAB.  ABD: Softly distended, less TTP. LLQ malecot drain to gravity- bilious output (expected as drain is within bowel lumen); LLQ & LUQ CASEY drains- minimal SS. Midline abd wound vac in place w/ good seal; collection of blood trapped under plastic again noted; SS output in canister.  : Li- clear yellow urine.   EXTR: WWP. Peripheral edema improving; RUE remains more swollen than LUE.  Vasc: 2+ radial, 1+ DP pulses  MSK: No joint swelling.   SKIN: Fungal-appearing rash beneath panniculus much improved.     LABS:                        7.7    8.7   )-----------( 329      ( 14 Aug 2017 06:13 )             23.6     139  |  100  |  24<H>  ----------------------------<  124<H>  3.9   |  29  |  0.70    Ca    8.8      14 Aug 2017 06:13  Phos  3.8     08-14  Mg     2.1     08-14    PT/INR - ( 12 Aug 2017 16:11 )   PT: 13.2 sec;   INR: 1.19     PTT - ( 12 Aug 2017 16:11 )  PTT:27.8 sec SICU DAILY PROGRESS NOTE      INTERVAL HPI / OVERNIGHT EVENTS:   O/N: no events - area of blood collection in dressing unchanged, drain stripped  8/13: Hgbn drifted down to 7.8- given lasix 20 x1 in am repeat hgb @ 1:7.6 started sqh. Axillary dvt is chronic spoke with radiology -   O/N: 10PM H/H: 8.0/24.6 from 8.4/25.4. Left Upper CASEY now with bilious output  8/12: Hgb 6.0 - with increased bloody output from CASEY drains- given 2 u repeat hgb: 8.4  casey output decreased in afternoon repeat ptt/ INR: wnl repeat cbc @10  O/N:  drawn from central line where heparin was running in; repeat  drawn from leg; held heparin gtt x 1hr & will decrease to 15cc/hr; 630am  thus holding heparin x 1 hr & will decrease to 12cc/hr. AM hgb 6.1 from 7.1; repeat hgb sent stat. Low-grade fevers o/n; Tm 100.6.    ROS: no SOB, N/V, HA    MEDICATIONS:  ---NEURO-  ondansetron Injectable 4 milliGRAM(s) IV Push every 6 hours PRN  acetaminophen  IVPB 1000 milliGRAM(s) IV Intermittent once PRN  HYDROmorphone  Injectable 1 milliGRAM(s) IV Push every 4 hours PRN  HYDROmorphone  Injectable 0.5 milliGRAM(s) IV Push every 4 hours PRN  fentaNYL   Infusion 0.101 MICROgram(s)/kG/Hr IV Continuous <Continuous>  propofol Infusion 1.675 MICROgram(s)/kG/Min IV Continuous <Continuous>  ---GI/FEN-  pantoprazole  Injectable 40 milliGRAM(s) IV Push daily  dextrose 5%. 1000 milliLiter(s) IV Continuous <Continuous>  cyanocobalamin Injectable 1000 MICROGram(s) SubCutaneous every 7 days  calcitriol Injectable 1 MICROGram(s) IV Push <User Schedule>  Parenteral Nutrition - Adult 1 Each TPN Continuous <Continuous>  ---ID-   linezolid  IVPB 600 milliGRAM(s) IV Intermittent every 12 hours  meropenem IVPB 1000 milliGRAM(s) IV Intermittent every 8 hours  fluconAZOLE IVPB 400 milliGRAM(s) IV Intermittent every 24 hours  ---ENDO-  insulin lispro (HumaLOG) corrective regimen sliding scale   SubCutaneous every 6 hours  dextrose 50% Injectable 25 Gram(s) IV Push once  dextrose 50% Injectable 12.5 Gram(s) IV Push once  ---HEME-  heparin  Injectable 5000 Unit(s) SubCutaneous every 8 hours  ---OTHER-  sodium chloride 0.9% lock flush 10 milliLiter(s) IV Push every 1 hour PRN  sodium chloride 0.9% lock flush 10 milliLiter(s) IV Push every 12 hours PRN  sodium chloride 0.9% lock flush 20 milliLiter(s) IV Push once  nystatin Powder 1 Application(s) Topical two times a day  chlorhexidine 0.12% Liquid 15 milliLiter(s) Swish and Spit two times a day    CENTRAL LINE: YES - L subclavian (8/10-)             Remove: NO           Indication: TPN & venous access     LI: YES             Remove: NO           Indication: UOP monitoring in critically ill patient    A-LINE: NO    VOLUME STATUS:   I&O's Detail    13 Aug 2017 07:01  -  14 Aug 2017 07:00  --------------------------------------------------------  IN:    Fat Emulsion 20%: 271 mL    fentaNYL  Infusion: 51 mL    IV PiggyBack: 750 mL    propofol Infusion: 141 mL    TPN (Total Parenteral Nutrition): 1608 mL  Total IN: 2821 mL    OUT:    Bulb: 30 mL    Bulb: 115 mL    Bulb: 37 mL    Drain: 185 mL    Indwelling Catheter - Urethral: 4840 mL    VAC (Vacuum Assisted Closure) System: 100 mL  Total OUT: 5307 mL    Total NET: -2486 mL      VITALS:  ICU Vital Signs Last 24 Hrs  T(C): 37.9 (14 Aug 2017 05:45), Max: 37.9 (14 Aug 2017 05:45)  T(F): 100.2 (14 Aug 2017 05:45), Max: 100.2 (14 Aug 2017 05:45)  HR: 78 (14 Aug 2017 07:00) (66 - 80)  BP: 114/65 (14 Aug 2017 07:00) (100/59 - 130/74)  BP(mean): 85 (14 Aug 2017 07:00) (73 - 100)  ABP: --  ABP(mean): --  RR: 14 (14 Aug 2017 07:00) (0 - 18)  SpO2: 99% (14 Aug 2017 07:00) (97% - 100%)    Mode: AC/ CMV (Assist Control/ Continuous Mandatory Ventilation)  RR (machine): 12  TV (machine): 450  FiO2: 40  PEEP: 5  ITime: 1  MAP: 9  PIP: 23    PHYSICAL EXAM:  NEURO: Sedated, arousable, RASS -2; NAD  HEENT: PERRL, MMM  CV: RRR  PULM: Intubated, CTAB.  ABD: Softly distended, less TTP. LLQ malecot drain to gravity- bilious output (expected as drain is within bowel lumen); LLQ & LUQ CASEY drains- minimal SS. Midline abd wound vac in place w/ good seal; collection of blood trapped under plastic again noted; SS output in canister.  : Li- clear yellow urine.   EXTR: WWP. Peripheral edema improving; RUE remains more swollen than LUE.  Vasc: 2+ radial, 1+ DP pulses  MSK: No joint swelling.   SKIN: no rash     LABS:                        7.7    8.7   )-----------( 329      ( 14 Aug 2017 06:13 )             23.6     139  |  100  |  24<H>  ----------------------------<  124<H>  3.9   |  29  |  0.70    Ca    8.8      14 Aug 2017 06:13  Phos  3.8     08-14  Mg     2.1     08-14    PT/INR - ( 12 Aug 2017 16:11 )   PT: 13.2 sec;   INR: 1.19     PTT - ( 12 Aug 2017 16:11 )  PTT:27.8 sec

## 2017-08-15 LAB
ANION GAP SERPL CALC-SCNC: 10 MMOL/L — SIGNIFICANT CHANGE UP (ref 5–17)
BUN SERPL-MCNC: 25 MG/DL — HIGH (ref 7–23)
CALCIUM SERPL-MCNC: 8.4 MG/DL — SIGNIFICANT CHANGE UP (ref 8.4–10.5)
CHLORIDE SERPL-SCNC: 98 MMOL/L — SIGNIFICANT CHANGE UP (ref 96–108)
CO2 SERPL-SCNC: 29 MMOL/L — SIGNIFICANT CHANGE UP (ref 22–31)
CREAT SERPL-MCNC: 0.6 MG/DL — SIGNIFICANT CHANGE UP (ref 0.5–1.3)
CULTURE RESULTS: SIGNIFICANT CHANGE UP
GLUCOSE SERPL-MCNC: 121 MG/DL — HIGH (ref 70–99)
HCT VFR BLD CALC: 24.7 % — LOW (ref 34.5–45)
HGB BLD-MCNC: 7.9 G/DL — LOW (ref 11.5–15.5)
MAGNESIUM SERPL-MCNC: 2.2 MG/DL — SIGNIFICANT CHANGE UP (ref 1.6–2.6)
MCHC RBC-ENTMCNC: 28.5 PG — SIGNIFICANT CHANGE UP (ref 27–34)
MCHC RBC-ENTMCNC: 32 G/DL — SIGNIFICANT CHANGE UP (ref 32–36)
MCV RBC AUTO: 89.2 FL — SIGNIFICANT CHANGE UP (ref 80–100)
ORGANISM # SPEC MICROSCOPIC CNT: SIGNIFICANT CHANGE UP
ORGANISM # SPEC MICROSCOPIC CNT: SIGNIFICANT CHANGE UP
PHOSPHATE SERPL-MCNC: 4.2 MG/DL — SIGNIFICANT CHANGE UP (ref 2.5–4.5)
PLATELET # BLD AUTO: 348 K/UL — SIGNIFICANT CHANGE UP (ref 150–400)
POTASSIUM SERPL-MCNC: 4.2 MMOL/L — SIGNIFICANT CHANGE UP (ref 3.5–5.3)
POTASSIUM SERPL-SCNC: 4.2 MMOL/L — SIGNIFICANT CHANGE UP (ref 3.5–5.3)
RBC # BLD: 2.77 M/UL — LOW (ref 3.8–5.2)
RBC # FLD: 16.6 % — SIGNIFICANT CHANGE UP (ref 10.3–16.9)
SODIUM SERPL-SCNC: 137 MMOL/L — SIGNIFICANT CHANGE UP (ref 135–145)
SPECIMEN SOURCE: SIGNIFICANT CHANGE UP
WBC # BLD: 9.9 K/UL — SIGNIFICANT CHANGE UP (ref 3.8–10.5)
WBC # FLD AUTO: 9.9 K/UL — SIGNIFICANT CHANGE UP (ref 3.8–10.5)

## 2017-08-15 PROCEDURE — 99233 SBSQ HOSP IP/OBS HIGH 50: CPT | Mod: GC

## 2017-08-15 PROCEDURE — 71010: CPT | Mod: 26

## 2017-08-15 PROCEDURE — 99232 SBSQ HOSP IP/OBS MODERATE 35: CPT

## 2017-08-15 RX ORDER — I.V. FAT EMULSION 20 G/100ML
50 EMULSION INTRAVENOUS
Qty: 0 | Refills: 0 | Status: DISCONTINUED | OUTPATIENT
Start: 2017-08-15 | End: 2017-08-18

## 2017-08-15 RX ORDER — ELECTROLYTE SOLUTION,INJ
1 VIAL (ML) INTRAVENOUS
Qty: 0 | Refills: 0 | Status: DISCONTINUED | OUTPATIENT
Start: 2017-08-15 | End: 2017-08-15

## 2017-08-15 RX ADMIN — HEPARIN SODIUM 5000 UNIT(S): 5000 INJECTION INTRAVENOUS; SUBCUTANEOUS at 05:34

## 2017-08-15 RX ADMIN — MEROPENEM 100 MILLIGRAM(S): 1 INJECTION INTRAVENOUS at 21:31

## 2017-08-15 RX ADMIN — MEROPENEM 100 MILLIGRAM(S): 1 INJECTION INTRAVENOUS at 06:06

## 2017-08-15 RX ADMIN — HYDROMORPHONE HYDROCHLORIDE 0.5 MILLIGRAM(S): 2 INJECTION INTRAMUSCULAR; INTRAVENOUS; SUBCUTANEOUS at 20:57

## 2017-08-15 RX ADMIN — NYSTATIN CREAM 1 APPLICATION(S): 100000 CREAM TOPICAL at 06:06

## 2017-08-15 RX ADMIN — HEPARIN SODIUM 5000 UNIT(S): 5000 INJECTION INTRAVENOUS; SUBCUTANEOUS at 13:12

## 2017-08-15 RX ADMIN — CHLORHEXIDINE GLUCONATE 15 MILLILITER(S): 213 SOLUTION TOPICAL at 06:06

## 2017-08-15 RX ADMIN — HEPARIN SODIUM 5000 UNIT(S): 5000 INJECTION INTRAVENOUS; SUBCUTANEOUS at 21:31

## 2017-08-15 RX ADMIN — NYSTATIN CREAM 1 APPLICATION(S): 100000 CREAM TOPICAL at 18:29

## 2017-08-15 RX ADMIN — HYDROMORPHONE HYDROCHLORIDE 1 MILLIGRAM(S): 2 INJECTION INTRAMUSCULAR; INTRAVENOUS; SUBCUTANEOUS at 07:52

## 2017-08-15 RX ADMIN — LINEZOLID 300 MILLIGRAM(S): 600 INJECTION, SOLUTION INTRAVENOUS at 06:33

## 2017-08-15 RX ADMIN — MEROPENEM 100 MILLIGRAM(S): 1 INJECTION INTRAVENOUS at 13:12

## 2017-08-15 RX ADMIN — I.V. FAT EMULSION 20.83 GRAM(S): 20 EMULSION INTRAVENOUS at 21:32

## 2017-08-15 RX ADMIN — HYDROMORPHONE HYDROCHLORIDE 1 MILLIGRAM(S): 2 INJECTION INTRAMUSCULAR; INTRAVENOUS; SUBCUTANEOUS at 07:23

## 2017-08-15 RX ADMIN — HYDROMORPHONE HYDROCHLORIDE 1 MILLIGRAM(S): 2 INJECTION INTRAMUSCULAR; INTRAVENOUS; SUBCUTANEOUS at 03:07

## 2017-08-15 RX ADMIN — HYDROMORPHONE HYDROCHLORIDE 0.5 MILLIGRAM(S): 2 INJECTION INTRAMUSCULAR; INTRAVENOUS; SUBCUTANEOUS at 20:42

## 2017-08-15 RX ADMIN — FLUCONAZOLE 100 MILLIGRAM(S): 150 TABLET ORAL at 07:12

## 2017-08-15 RX ADMIN — CHLORHEXIDINE GLUCONATE 15 MILLILITER(S): 213 SOLUTION TOPICAL at 18:29

## 2017-08-15 RX ADMIN — HYDROMORPHONE HYDROCHLORIDE 1 MILLIGRAM(S): 2 INJECTION INTRAMUSCULAR; INTRAVENOUS; SUBCUTANEOUS at 03:43

## 2017-08-15 RX ADMIN — PANTOPRAZOLE SODIUM 40 MILLIGRAM(S): 20 TABLET, DELAYED RELEASE ORAL at 12:12

## 2017-08-15 NOTE — ADVANCED PRACTICE NURSE CONSULT - ASSESSMENT
DTI to sacrum now opening to reveal a stage 3 ulcer, approx 5x4 cm, Stage 2 ulcer to right buttock, approx 2x1 cm. Entire area moist with denuded skin scattered around ulcerations. Pt on AirTap with wedges being used for repositioning.

## 2017-08-15 NOTE — ADVANCED PRACTICE NURSE CONSULT - RECOMMEDATIONS
Recommend continuing moisture barrier ointment to sites, repositioning every 2 hours. Spoke with house staff and covering ROM Page.

## 2017-08-15 NOTE — PROGRESS NOTE ADULT - SUBJECTIVE AND OBJECTIVE BOX
SICU DAILY PROGRESS NOTE      INTERVAL HPI / OVERNIGHT EVENTS:       MEDICATIONS:  ---NEURO-  ondansetron Injectable 4 milliGRAM(s) IV Push every 6 hours PRN  acetaminophen  IVPB 1000 milliGRAM(s) IV Intermittent once PRN  HYDROmorphone  Injectable 1 milliGRAM(s) IV Push every 4 hours PRN  HYDROmorphone  Injectable 0.5 milliGRAM(s) IV Push every 4 hours PRN  ---CV-  ---PULM-  ---GI/FEN-  pantoprazole  Injectable 40 milliGRAM(s) IV Push daily  dextrose 5%. 1000 milliLiter(s) IV Continuous <Continuous>  cyanocobalamin Injectable 1000 MICROGram(s) SubCutaneous every 7 days  calcitriol Injectable 1 MICROGram(s) IV Push <User Schedule>  Parenteral Nutrition - Adult 1 Each TPN Continuous <Continuous>  ----  ---ID-   linezolid  IVPB 600 milliGRAM(s) IV Intermittent every 12 hours  meropenem IVPB 1000 milliGRAM(s) IV Intermittent every 8 hours  fluconAZOLE IVPB 400 milliGRAM(s) IV Intermittent every 24 hours  ---ENDO-  insulin lispro (HumaLOG) corrective regimen sliding scale   SubCutaneous every 6 hours  dextrose 50% Injectable 25 Gram(s) IV Push once  dextrose 50% Injectable 12.5 Gram(s) IV Push once  ---HEME-  heparin  Injectable 5000 Unit(s) SubCutaneous every 8 hours  ---OTHER-  sodium chloride 0.9% lock flush 10 milliLiter(s) IV Push every 1 hour PRN  sodium chloride 0.9% lock flush 10 milliLiter(s) IV Push every 12 hours PRN  sodium chloride 0.9% lock flush 20 milliLiter(s) IV Push once  nystatin Powder 1 Application(s) Topical two times a day  chlorhexidine 0.12% Liquid 15 milliLiter(s) Swish and Spit two times a day      CENTRAL LINE -- LOCATION, DATE INSERTED:      Remove: NO     Indication: Venous access in critically ill patient.    ARTERIAL LINE:      Remove: NO     Indication: Hemodynamic monitoring in critically ill patient.     URINARY CATHETER:      Remove: NO     Indication: I/O monitoring in critically ill patient.     VOLUME STATUS:   I&O's Detail    14 Aug 2017 07:01  -  15 Aug 2017 07:00  --------------------------------------------------------  IN:    Fat Emulsion 20%: 250.2 mL    IV PiggyBack: 500 mL    propofol Infusion: 15 mL    TPN (Total Parenteral Nutrition): 1474 mL  Total IN: 2239.2 mL    OUT:    Bulb: 83 mL    Bulb: 66 mL    Drain: 175 mL    Indwelling Catheter - Urethral: 4100 mL    VAC (Vacuum Assisted Closure) System: 250 mL  Total OUT: 4674 mL    Total NET: -2434.8 mL      VITALS:  ICU Vital Signs Last 24 Hrs  T(C): 38.3 (15 Aug 2017 06:30), Max: 38.3 (15 Aug 2017 06:30)  T(F): 101 (15 Aug 2017 06:30), Max: 101 (15 Aug 2017 06:30)  HR: 82 (15 Aug 2017 06:00) (75 - 88)  BP: 119/59 (15 Aug 2017 06:00) (98/51 - 129/67)  BP(mean): 79 (15 Aug 2017 06:00) (69 - 98)  ABP: --  ABP(mean): --  RR: 17 (15 Aug 2017 06:00) (12 - 20)  SpO2: 96% (15 Aug 2017 06:00) (95% - 100%)    Mode: CPAP with PS  FiO2: 40  PEEP: 5  PS: 8  ITime: 1  MAP: 7.1  PIP: 13    CAPILLARY BLOOD GLUCOSE  98 (15 Aug 2017 06:00)  127 (14 Aug 2017 23:00)  110 (14 Aug 2017 16:26)  126 (14 Aug 2017 10:55)      PHYSICAL EXAM:    LABS:                        7.9    9.9   )-----------( 348      ( 15 Aug 2017 06:31 )             24.7     139  |  100  |  24<H>  ----------------------------<  124<H>  3.9   |  29  |  0.70    Ca    8.8      14 Aug 2017 06:13  Phos  3.8     08-14  Mg     2.1     08-14            CULTURES: SICU DAILY PROGRESS NOTE      INTERVAL HPI / OVERNIGHT EVENTS:   8/15: chlorothiazide 500 mg IV once. Per ID, stopped fluconazole & Zyvox; will continue meropenem 5 more days. ENT consulted for hearing loss.   O/N: no events, Lasix held dt over 2L negative already  8/14: Vac changed today- small collection of green succus noted under left lateral aspect of vicryl mesh; attempted aspiration. Given 20 Lasix in am. Extubated 3pm. Sputum cx from 8/11 growing carbapenem-resistant pseudomonas- will not treat given that pt is extubated and has clinically improved on current abx.     MEDICATIONS:  ---NEURO-  ondansetron Injectable 4 milliGRAM(s) IV Push every 6 hours PRN  acetaminophen  IVPB 1000 milliGRAM(s) IV Intermittent once PRN  HYDROmorphone  Injectable 1 milliGRAM(s) IV Push every 4 hours PRN  HYDROmorphone  Injectable 0.5 milliGRAM(s) IV Push every 4 hours PRN  ---GI/FEN-  pantoprazole  Injectable 40 milliGRAM(s) IV Push daily  dextrose 5%. 1000 milliLiter(s) IV Continuous <Continuous>  cyanocobalamin Injectable 1000 MICROGram(s) SubCutaneous every 7 days  calcitriol Injectable 1 MICROGram(s) IV Push <User Schedule>  Parenteral Nutrition - Adult 1 Each TPN Continuous <Continuous>  ---ID-   linezolid  IVPB 600 milliGRAM(s) IV Intermittent every 12 hours  meropenem IVPB 1000 milliGRAM(s) IV Intermittent every 8 hours  fluconAZOLE IVPB 400 milliGRAM(s) IV Intermittent every 24 hours  ---ENDO-  insulin lispro (HumaLOG) corrective regimen sliding scale   SubCutaneous every 6 hours  dextrose 50% Injectable 25 Gram(s) IV Push once  dextrose 50% Injectable 12.5 Gram(s) IV Push once  ---HEME-  heparin  Injectable 5000 Unit(s) SubCutaneous every 8 hours  ---OTHER-  sodium chloride 0.9% lock flush 10 milliLiter(s) IV Push every 1 hour PRN  sodium chloride 0.9% lock flush 10 milliLiter(s) IV Push every 12 hours PRN  sodium chloride 0.9% lock flush 20 milliLiter(s) IV Push once  nystatin Powder 1 Application(s) Topical two times a day  chlorhexidine 0.12% Liquid 15 milliLiter(s) Swish and Spit two times a day      CENTRAL LINE: YES - L subclavian (8/10-)             Remove: NO           Indication: TPN & venous access     LI: YES             Remove: NO           Indication: UOP monitoring in critically ill patient    A-LINE: NO      VOLUME STATUS:   I&O's Detail    14 Aug 2017 07:01  -  15 Aug 2017 07:00  --------------------------------------------------------  IN:    Fat Emulsion 20%: 250.2 mL    IV PiggyBack: 500 mL    propofol Infusion: 15 mL    TPN (Total Parenteral Nutrition): 1474 mL  Total IN: 2239.2 mL    OUT:    Bulb: 83 mL    Bulb: 66 mL    Drain: 175 mL    Indwelling Catheter - Urethral: 4100 mL    VAC (Vacuum Assisted Closure) System: 250 mL  Total OUT: 4674 mL    Total NET: -2434.8 mL      VITALS:  ICU Vital Signs Last 24 Hrs  T(C): 38.3 (15 Aug 2017 06:30), Max: 38.3 (15 Aug 2017 06:30)  T(F): 101 (15 Aug 2017 06:30), Max: 101 (15 Aug 2017 06:30)  HR: 82 (15 Aug 2017 06:00) (75 - 88)  BP: 119/59 (15 Aug 2017 06:00) (98/51 - 129/67)  BP(mean): 79 (15 Aug 2017 06:00) (69 - 98)  ABP: --  ABP(mean): --  RR: 17 (15 Aug 2017 06:00) (12 - 20)  SpO2: 96% (15 Aug 2017 06:00) (95% - 100%)    Mode: CPAP with PS  FiO2: 40  PEEP: 5  PS: 8  ITime: 1  MAP: 7.1  PIP: 13    CAPILLARY BLOOD GLUCOSE  98 (15 Aug 2017 06:00)  127 (14 Aug 2017 23:00)  110 (14 Aug 2017 16:26)  126 (14 Aug 2017 10:55)      PHYSICAL EXAM:  NEURO: A&Ox3, NAD, c/o new onset hearing loss   HEENT: PERRL, MMM  CV: RRR  PULM: CTAB; small L-sided pleural effusion noted on bedside US-- not large enough to merit drainage.  ABD: Softly distended, less TTP. LLQ malecot drain to gravity- bilious output (expected as drain is within bowel lumen); LLQ & LUQ CHECO drains- minimal SS. Midline abd wound vac in place w/ good seal; SS output in canister.  : Li- clear yellow urine.   EXTR: WWP. Peripheral edema improving.  VASC: 2+ radial, 1+ DP pulses  MSK: No joint swelling.   SKIN: No rash. Stage 2 sacral pressure ulcer present but does not appear infected.    LABS:                        7.9    9.9   )-----------( 348      ( 15 Aug 2017 06:31 )             24.7     139  |  100  |  24<H>  ----------------------------<  124<H>  3.9   |  29  |  0.70    Ca    8.8      14 Aug 2017 06:13  Phos  3.8     08-14  Mg     2.1     08-14 SICU DAILY PROGRESS NOTE      INTERVAL HPI / OVERNIGHT EVENTS:   8/15: chlorothiazide 500 mg IV once. Per ID, stopped fluconazole & Zyvox; will continue meropenem 5 more days. ENT consulted for hearing loss.   O/N: no events, Lasix held dt over 2L negative already  8/14: Vac changed today- small collection of green succus noted under left lateral aspect of vicryl mesh; attempted aspiration. Given 20 Lasix in am. Extubated 3pm. Sputum cx from 8/11 growing carbapenem-resistant pseudomonas- will not treat given that pt is extubated and has clinically improved on current abx.     MEDICATIONS:  ---NEURO-  ondansetron Injectable 4 milliGRAM(s) IV Push every 6 hours PRN  acetaminophen  IVPB 1000 milliGRAM(s) IV Intermittent once PRN  HYDROmorphone  Injectable 1 milliGRAM(s) IV Push every 4 hours PRN  HYDROmorphone  Injectable 0.5 milliGRAM(s) IV Push every 4 hours PRN  ---GI/FEN-  pantoprazole  Injectable 40 milliGRAM(s) IV Push daily  dextrose 5%. 1000 milliLiter(s) IV Continuous <Continuous>  cyanocobalamin Injectable 1000 MICROGram(s) SubCutaneous every 7 days  calcitriol Injectable 1 MICROGram(s) IV Push <User Schedule>  Parenteral Nutrition - Adult 1 Each TPN Continuous <Continuous>  ---ID-   linezolid  IVPB 600 milliGRAM(s) IV Intermittent every 12 hours  meropenem IVPB 1000 milliGRAM(s) IV Intermittent every 8 hours  fluconAZOLE IVPB 400 milliGRAM(s) IV Intermittent every 24 hours  ---ENDO-  insulin lispro (HumaLOG) corrective regimen sliding scale   SubCutaneous every 6 hours  dextrose 50% Injectable 25 Gram(s) IV Push once  dextrose 50% Injectable 12.5 Gram(s) IV Push once  ---HEME-  heparin  Injectable 5000 Unit(s) SubCutaneous every 8 hours  ---OTHER-  sodium chloride 0.9% lock flush 10 milliLiter(s) IV Push every 1 hour PRN  sodium chloride 0.9% lock flush 10 milliLiter(s) IV Push every 12 hours PRN  sodium chloride 0.9% lock flush 20 milliLiter(s) IV Push once  nystatin Powder 1 Application(s) Topical two times a day  chlorhexidine 0.12% Liquid 15 milliLiter(s) Swish and Spit two times a day      CENTRAL LINE: YES - L subclavian (8/10-)             Remove: NO           Indication: TPN & venous access     LI: YES             Remove: NO           Indication: UOP monitoring in critically ill patient    A-LINE: NO      VOLUME STATUS:   I&O's Detail    14 Aug 2017 07:01  -  15 Aug 2017 07:00  --------------------------------------------------------  IN:    Fat Emulsion 20%: 250.2 mL    IV PiggyBack: 500 mL    propofol Infusion: 15 mL    TPN (Total Parenteral Nutrition): 1474 mL  Total IN: 2239.2 mL    OUT:    Bulb: 83 mL    Bulb: 66 mL    Drain: 175 mL    Indwelling Catheter - Urethral: 4100 mL    VAC (Vacuum Assisted Closure) System: 250 mL  Total OUT: 4674 mL    Total NET: -2434.8 mL      VITALS:  ICU Vital Signs Last 24 Hrs  T(C): 38.3 (15 Aug 2017 06:30), Max: 38.3 (15 Aug 2017 06:30)  T(F): 101 (15 Aug 2017 06:30), Max: 101 (15 Aug 2017 06:30)  HR: 82 (15 Aug 2017 06:00) (75 - 88)  BP: 119/59 (15 Aug 2017 06:00) (98/51 - 129/67)  BP(mean): 79 (15 Aug 2017 06:00) (69 - 98)  ABP: --  ABP(mean): --  RR: 17 (15 Aug 2017 06:00) (12 - 20)  SpO2: 96% (15 Aug 2017 06:00) (95% - 100%)    Mode: CPAP with PS  FiO2: 40  PEEP: 5  PS: 8  ITime: 1  MAP: 7.1  PIP: 13    CAPILLARY BLOOD GLUCOSE  98 (15 Aug 2017 06:00)  127 (14 Aug 2017 23:00)  110 (14 Aug 2017 16:26)  126 (14 Aug 2017 10:55)      PHYSICAL EXAM:  NEURO: A&Ox3, NAD, c/o new onset hearing loss   HEENT: PERRL, MMM  CV: RRR  PULM: CTAB; small L-sided pleural effusion noted on bedside US-- not large enough to merit drainage.  ABD: Softly distended, less TTP. LLQ malecot drain to gravity- bilious output (expected as drain is within bowel lumen); LLQ & LUQ CHECO drains- minimal SS. Midline abd wound vac in place w/ good seal; SS output in canister.  : Li- clear yellow urine.   EXTR: WWP. Peripheral edema improving.  VASC: 2+ radial, 1+ DP pulses  MSK: No joint swelling.   SKIN: No rash. Stage 2 sacral pressure ulcer present but does not appear infected.  Psych: affect appropriate    LABS:                        7.9    9.9   )-----------( 348      ( 15 Aug 2017 06:31 )             24.7     139  |  100  |  24<H>  ----------------------------<  124<H>  3.9   |  29  |  0.70    Ca    8.8      14 Aug 2017 06:13  Phos  3.8     08-14  Mg     2.1     08-14

## 2017-08-15 NOTE — PROGRESS NOTE ADULT - ASSESSMENT
IMP:  Sepsis secondary to pneumonia vs abdominal wound infection.  She had a fever this morning but overall has improved clinically since adding the meropenem several days ago to the point where her WBC has normalized and she was extubated.  Suspect the carbapenemase resistant pseudomonas aeruginosa is a colonizer of the ET tube and not a true pathogen in this case.    Recommend:  1.  Can stop fluconazole now as she has had > 2 weeks of antifungals and latest cultures do not show any candida  2.  Can stop linezolid now  3.  Continue meropenem.  If she continues to remain stable/improves, would continue meropenem x 5 more days then stop.

## 2017-08-15 NOTE — PHYSICAL THERAPY INITIAL EVALUATION ADULT - PERTINENT HX OF CURRENT PROBLEM, REHAB EVAL
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

## 2017-08-15 NOTE — PROGRESS NOTE ADULT - ASSESSMENT
57 F s/p  SBR resection, fistula resection, esophagojejunostomy revision w/ post-op course complicated by RTOR for distal anastomosis breakdown, ATN, acute respiratory failure, & septic shock.     Neuro: Dilaudid  PRN; ENT consult re: hearing loss   CV: MAP 65  Resp: Satting well on NC   GI/FEN: NPO, TPN/lipids, Vit B12, Protonix  : Yu for I/O monitoring in critically ill pt; BUN improving  Endo: ISS calcitriol 2* vit d def.   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 unable to start hep 2* to bleeding.   Drains: LLQ CHECO. LUQ CHECO, Malencot in enterotomy site, wound vac to midline  Wounds: Midline xiphoid to pubis incision; DTI & stage 2 pressure ulcer noted.

## 2017-08-15 NOTE — PROGRESS NOTE ADULT - SUBJECTIVE AND OBJECTIVE BOX
extubated  looks fine this am  vac in place  jps draining sangiouness material  and mallicot with bile  is diuresing well  c/o ear congestion will give decongestant  start physical therapy  will change vac tomorrow  very stable and improving continue tpn  issue is getting abdominal wall to close and continue to have fistula control that will hopefully heal with mallicot and control

## 2017-08-15 NOTE — PHYSICAL THERAPY INITIAL EVALUATION ADULT - DID THE PATIENT HAVE SURGERY?
Exploratory laparotomy, primary repair of anastamotic breakdown, abdominal vac placement; 8/7/17 Exploratory laparotomy, removal of BIOA synthetic mesh, abdominal washout, drainage of intra-abdominal abscess, fascia debridement, and insertion of intra-abdominal drain. Placement of negative pressure wound vac system./yes

## 2017-08-15 NOTE — PHYSICAL THERAPY INITIAL EVALUATION ADULT - GENERAL OBSERVATIONS, REHAB EVAL
Patient received semi-supine in bed in no observed distress, +EKG, +TLC, ++malecot drain, +JPx2, +wound vac, +stone, +nasal cannula 2 L.

## 2017-08-16 DIAGNOSIS — R09.02 HYPOXEMIA: ICD-10-CM

## 2017-08-16 DIAGNOSIS — J98.09 OTHER DISEASES OF BRONCHUS, NOT ELSEWHERE CLASSIFIED: ICD-10-CM

## 2017-08-16 LAB
ANION GAP SERPL CALC-SCNC: 9 MMOL/L — SIGNIFICANT CHANGE UP (ref 5–17)
BUN SERPL-MCNC: 20 MG/DL — SIGNIFICANT CHANGE UP (ref 7–23)
CALCIUM SERPL-MCNC: 8.5 MG/DL — SIGNIFICANT CHANGE UP (ref 8.4–10.5)
CHLORIDE SERPL-SCNC: 102 MMOL/L — SIGNIFICANT CHANGE UP (ref 96–108)
CO2 SERPL-SCNC: 27 MMOL/L — SIGNIFICANT CHANGE UP (ref 22–31)
CREAT SERPL-MCNC: 0.5 MG/DL — SIGNIFICANT CHANGE UP (ref 0.5–1.3)
CULTURE RESULTS: SIGNIFICANT CHANGE UP
CULTURE RESULTS: SIGNIFICANT CHANGE UP
GLUCOSE SERPL-MCNC: 108 MG/DL — HIGH (ref 70–99)
HCT VFR BLD CALC: 25.9 % — LOW (ref 34.5–45)
HGB BLD-MCNC: 8.2 G/DL — LOW (ref 11.5–15.5)
MAGNESIUM SERPL-MCNC: 2.2 MG/DL — SIGNIFICANT CHANGE UP (ref 1.6–2.6)
MCHC RBC-ENTMCNC: 28.6 PG — SIGNIFICANT CHANGE UP (ref 27–34)
MCHC RBC-ENTMCNC: 31.7 G/DL — LOW (ref 32–36)
MCV RBC AUTO: 90.2 FL — SIGNIFICANT CHANGE UP (ref 80–100)
PHOSPHATE SERPL-MCNC: 3.3 MG/DL — SIGNIFICANT CHANGE UP (ref 2.5–4.5)
PLATELET # BLD AUTO: 352 K/UL — SIGNIFICANT CHANGE UP (ref 150–400)
POTASSIUM SERPL-MCNC: 4.3 MMOL/L — SIGNIFICANT CHANGE UP (ref 3.5–5.3)
POTASSIUM SERPL-SCNC: 4.3 MMOL/L — SIGNIFICANT CHANGE UP (ref 3.5–5.3)
RBC # BLD: 2.87 M/UL — LOW (ref 3.8–5.2)
RBC # FLD: 16.6 % — SIGNIFICANT CHANGE UP (ref 10.3–16.9)
SODIUM SERPL-SCNC: 138 MMOL/L — SIGNIFICANT CHANGE UP (ref 135–145)
SPECIMEN SOURCE: SIGNIFICANT CHANGE UP
SPECIMEN SOURCE: SIGNIFICANT CHANGE UP
WBC # BLD: 9.4 K/UL — SIGNIFICANT CHANGE UP (ref 3.8–10.5)
WBC # FLD AUTO: 9.4 K/UL — SIGNIFICANT CHANGE UP (ref 3.8–10.5)

## 2017-08-16 PROCEDURE — 97608 NEG PRS WND THER NDME>50SQCM: CPT

## 2017-08-16 PROCEDURE — 31622 DX BRONCHOSCOPE/WASH: CPT | Mod: GC

## 2017-08-16 PROCEDURE — 99233 SBSQ HOSP IP/OBS HIGH 50: CPT | Mod: GC

## 2017-08-16 PROCEDURE — 97606 NEG PRS WND THER DME>50 SQCM: CPT | Mod: 59

## 2017-08-16 PROCEDURE — 99223 1ST HOSP IP/OBS HIGH 75: CPT | Mod: 25,GC

## 2017-08-16 PROCEDURE — 71010: CPT | Mod: 26,76

## 2017-08-16 RX ORDER — ELECTROLYTE SOLUTION,INJ
1 VIAL (ML) INTRAVENOUS
Qty: 0 | Refills: 0 | Status: DISCONTINUED | OUTPATIENT
Start: 2017-08-16 | End: 2017-08-16

## 2017-08-16 RX ORDER — FENTANYL CITRATE 50 UG/ML
50 INJECTION INTRAVENOUS ONCE
Qty: 0 | Refills: 0 | Status: DISCONTINUED | OUTPATIENT
Start: 2017-08-16 | End: 2017-08-16

## 2017-08-16 RX ORDER — MIDAZOLAM HYDROCHLORIDE 1 MG/ML
4 INJECTION, SOLUTION INTRAMUSCULAR; INTRAVENOUS ONCE
Qty: 0 | Refills: 0 | Status: DISCONTINUED | OUTPATIENT
Start: 2017-08-16 | End: 2017-08-16

## 2017-08-16 RX ORDER — I.V. FAT EMULSION 20 G/100ML
50 EMULSION INTRAVENOUS
Qty: 0 | Refills: 0 | Status: DISCONTINUED | OUTPATIENT
Start: 2017-08-16 | End: 2017-08-16

## 2017-08-16 RX ORDER — ACETAMINOPHEN 500 MG
1000 TABLET ORAL ONCE
Qty: 0 | Refills: 0 | Status: COMPLETED | OUTPATIENT
Start: 2017-08-16 | End: 2017-08-17

## 2017-08-16 RX ADMIN — CHLORHEXIDINE GLUCONATE 15 MILLILITER(S): 213 SOLUTION TOPICAL at 05:33

## 2017-08-16 RX ADMIN — HYDROMORPHONE HYDROCHLORIDE 1 MILLIGRAM(S): 2 INJECTION INTRAMUSCULAR; INTRAVENOUS; SUBCUTANEOUS at 15:42

## 2017-08-16 RX ADMIN — HYDROMORPHONE HYDROCHLORIDE 1 MILLIGRAM(S): 2 INJECTION INTRAMUSCULAR; INTRAVENOUS; SUBCUTANEOUS at 04:44

## 2017-08-16 RX ADMIN — Medication 400 MILLIGRAM(S): at 06:12

## 2017-08-16 RX ADMIN — HYDROMORPHONE HYDROCHLORIDE 1 MILLIGRAM(S): 2 INJECTION INTRAMUSCULAR; INTRAVENOUS; SUBCUTANEOUS at 05:52

## 2017-08-16 RX ADMIN — MEROPENEM 100 MILLIGRAM(S): 1 INJECTION INTRAVENOUS at 22:00

## 2017-08-16 RX ADMIN — FENTANYL CITRATE 50 MICROGRAM(S): 50 INJECTION INTRAVENOUS at 10:22

## 2017-08-16 RX ADMIN — CHLORHEXIDINE GLUCONATE 15 MILLILITER(S): 213 SOLUTION TOPICAL at 18:38

## 2017-08-16 RX ADMIN — I.V. FAT EMULSION 20.83 GRAM(S): 20 EMULSION INTRAVENOUS at 22:00

## 2017-08-16 RX ADMIN — Medication 1 EACH: at 18:38

## 2017-08-16 RX ADMIN — HEPARIN SODIUM 5000 UNIT(S): 5000 INJECTION INTRAVENOUS; SUBCUTANEOUS at 13:59

## 2017-08-16 RX ADMIN — HYDROMORPHONE HYDROCHLORIDE 1 MILLIGRAM(S): 2 INJECTION INTRAMUSCULAR; INTRAVENOUS; SUBCUTANEOUS at 12:17

## 2017-08-16 RX ADMIN — NYSTATIN CREAM 1 APPLICATION(S): 100000 CREAM TOPICAL at 05:33

## 2017-08-16 RX ADMIN — NYSTATIN CREAM 1 APPLICATION(S): 100000 CREAM TOPICAL at 18:38

## 2017-08-16 RX ADMIN — MEROPENEM 100 MILLIGRAM(S): 1 INJECTION INTRAVENOUS at 13:59

## 2017-08-16 RX ADMIN — HYDROMORPHONE HYDROCHLORIDE 1 MILLIGRAM(S): 2 INJECTION INTRAMUSCULAR; INTRAVENOUS; SUBCUTANEOUS at 22:00

## 2017-08-16 RX ADMIN — HYDROMORPHONE HYDROCHLORIDE 1 MILLIGRAM(S): 2 INJECTION INTRAMUSCULAR; INTRAVENOUS; SUBCUTANEOUS at 21:30

## 2017-08-16 RX ADMIN — MIDAZOLAM HYDROCHLORIDE 4 MILLIGRAM(S): 1 INJECTION, SOLUTION INTRAMUSCULAR; INTRAVENOUS at 10:21

## 2017-08-16 RX ADMIN — HEPARIN SODIUM 5000 UNIT(S): 5000 INJECTION INTRAVENOUS; SUBCUTANEOUS at 05:33

## 2017-08-16 RX ADMIN — FENTANYL CITRATE 50 MICROGRAM(S): 50 INJECTION INTRAVENOUS at 10:48

## 2017-08-16 RX ADMIN — CALCITRIOL 1 MICROGRAM(S): 0.5 CAPSULE ORAL at 11:23

## 2017-08-16 RX ADMIN — MEROPENEM 100 MILLIGRAM(S): 1 INJECTION INTRAVENOUS at 05:33

## 2017-08-16 RX ADMIN — PANTOPRAZOLE SODIUM 40 MILLIGRAM(S): 20 TABLET, DELAYED RELEASE ORAL at 11:23

## 2017-08-16 RX ADMIN — HEPARIN SODIUM 5000 UNIT(S): 5000 INJECTION INTRAVENOUS; SUBCUTANEOUS at 22:00

## 2017-08-16 RX ADMIN — PREGABALIN 1000 MICROGRAM(S): 225 CAPSULE ORAL at 11:23

## 2017-08-16 RX ADMIN — HYDROMORPHONE HYDROCHLORIDE 1 MILLIGRAM(S): 2 INJECTION INTRAMUSCULAR; INTRAVENOUS; SUBCUTANEOUS at 00:49

## 2017-08-16 RX ADMIN — HYDROMORPHONE HYDROCHLORIDE 1 MILLIGRAM(S): 2 INJECTION INTRAMUSCULAR; INTRAVENOUS; SUBCUTANEOUS at 01:30

## 2017-08-16 NOTE — BRIEF OPERATIVE NOTE - OPERATION/FINDINGS
Indication: L lung opacity on CXR    Pre procedure medications - Lidocaine 2%, Versed, Fantanyl    Findings:  Pre bronchoscopy sedation given with Versed and Fentanyl. Bronchoscope inserted through the mouth. Airway evaluation revealed Sharp Chrissie. L main bronchus with thick mucus plug which was therapeutically aspirated. post aspiration JARVIS and LLL evaluation open and clean. RUL, RML, RLL revealed minimal secretions. No endobronchial lesions or bleeding noted throughout the procedure. Bronchoscope then withdrawn from ETT.   No complications. Pt tolerated the procedure well.

## 2017-08-16 NOTE — PROGRESS NOTE ADULT - SUBJECTIVE AND OBJECTIVE BOX
dressing changed today and penrose placed under vicryl mesh in single area of staining  CEHCO s have bilious appearance  the remainder of wound is granulating well  we will cut sponge smaller to encourage contraction  had episode of mucous plugging  continue Pt and tpn  plan is to continue present management  consider mallicot study in 7 to 10 days depending on drain output

## 2017-08-16 NOTE — PROGRESS NOTE ADULT - ASSESSMENT
57 F s/p  SBR resection, fistula resection, esophagojejunostomy revision w/ post-op course complicated by RTOR for distal anastomosis breakdown, ATN, acute respiratory failure, & septic shock.     Neuro: Dilaudid  PRN; ENT consult re: hearing loss   CV: MAP 65  Resp: Satting well on NC; Pulm consult    GI/FEN: NPO, TPN/lipids, Vit B12, Protonix  : TOV   Endo: ISS calcitriol 2* vit d def.   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 unable to start hep 2* to bleeding.   Drains: LLQ CHECO. LUQ CHECO, Malencot in enterotomy site, wound vac to midline  Wounds: Midline xiphoid to pubis incision; DTI & stage 2 pressure ulcer noted. 57 F s/p  SBR resection, fistula resection, esophagojejunostomy revision w/ post-op course complicated by RTOR for distal anastomosis breakdown, ATN, acute respiratory failure, & septic shock. Mucus plugging. Chronic LUE DVT.    Neuro: Dilaudid  PRN; ENT consult re: hearing loss   CV: MAP 65  Resp: Satting well on NC; Pulm consult performed bronchoscopy and removed L mainstem mucus pluf   GI/FEN: NPO, TPN/lipids, Vit B12, Protonix  : TOV   Endo: ISS calcitriol 2* vit d def.   ID: anastamotic leak: meropenem (8/11--) // D/c Perioperative Gent/Vanc, colistin (7/29-8/1), vanc (8/1-8/2), micafungin (7/29-8/7), zosyn (8/1-11), fluconazole (8/7-8/15), linezolid (7/25-8/1; 8/2-8/15). Lungs colonized with resistant pseudomonas only sensitive to aminoglycosides; no Rx for now.  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 unable to start hep 2* to bleeding.   Drains: LLQ CHECO. LUQ CHECO, Malencot in enterotomy site, wound vac to midline  Wounds: Midline xiphoid to pubis incision; DTI & stage 2 pressure ulcer noted.

## 2017-08-16 NOTE — BRIEF OPERATIVE NOTE - COMMENTS
Patient tolerated the Procedure well. The chest x-ray revealed reexpansion of the left lung
put on Levophed 0.1mcg/kg/min throughout case for hypotension

## 2017-08-16 NOTE — PROGRESS NOTE ADULT - SUBJECTIVE AND OBJECTIVE BOX
Interval Events:  Patient seen and examined at bedside.  Pt known to our service from prior admissions. Here now for revision     MEDICATIONS:  Pulmonary:    Antimicrobials:  meropenem IVPB 1000 milliGRAM(s) IV Intermittent every 8 hours    Anticoagulants:  heparin  Injectable 5000 Unit(s) SubCutaneous every 8 hours    Cardiac:      Allergies    sulfa drugs (Unknown)  sulfamethoxazole (Other)    Intolerances        Vital Signs Last 24 Hrs  T(C): 37.2 (16 Aug 2017 09:34), Max: 38.4 (16 Aug 2017 05:46)  T(F): 99 (16 Aug 2017 09:34), Max: 101.2 (16 Aug 2017 05:46)  HR: 78 (16 Aug 2017 11:00) (74 - 88)  BP: 107/65 (16 Aug 2017 11:00) (107/65 - 142/82)  BP(mean): 81 (16 Aug 2017 11:00) (78 - 101)  RR: 23 (16 Aug 2017 11:00) (12 - 31)  SpO2: 93% (16 Aug 2017 11:00) (93% - 100%)    08-15 @ 07:01  -  08-16 @ 07:00  --------------------------------------------------------  IN: 2028.4 mL / OUT: 2640 mL / NET: -611.6 mL    08-16 @ 07:01  -  08-16 @ 11:35  --------------------------------------------------------  IN: 84.2 mL / OUT: 460 mL / NET: -375.8 mL          PHYSICAL EXAM:  General: Well developed; well nourished; in no acute distress  HEENT: PERRL, EOMI, non icteric  Neck: Supple; no masses  Respiratory: Clear to auscultation bilaterally, nowheezing or crackles  Cardiovascular: Regular rate and rhythm. S1 and S2 Normal; No murmurs  Gastrointestinal: Soft non-tender non-distended; Normal bowel sounds  Extremities:WWP, no edema, no cyanosis  Neurological: Alert and oriented x3  Skin: Warm and dry. No obvious rash    LABS:      CBC Full  -  ( 16 Aug 2017 05:23 )  WBC Count : 9.4 K/uL  Hemoglobin : 8.2 g/dL  Hematocrit : 25.9 %  Platelet Count - Automated : 352 K/uL  Mean Cell Volume : 90.2 fL  Mean Cell Hemoglobin : 28.6 pg  Mean Cell Hemoglobin Concentration : 31.7 g/dL  Auto Neutrophil # : x  Auto Lymphocyte # : x  Auto Monocyte # : x  Auto Eosinophil # : x  Auto Basophil # : x  Auto Neutrophil % : x  Auto Lymphocyte % : x  Auto Monocyte % : x  Auto Eosinophil % : x  Auto Basophil % : x    08-16    138  |  102  |  20  ----------------------------<  108<H>  4.3   |  27  |  0.50    Ca    8.5      16 Aug 2017 05:23  Phos  3.3     08-16  Mg     2.2     08-16                        RADIOLOGY imaging reviewed Interval Events:  Patient seen and examined at bedside.  Pt known to our service from prior admissions. Here now for revision. Now with new L sided opcaification on CXR, feeling well on NC (extubated yesterday).      MEDICATIONS:  Pulmonary:    Antimicrobials:  meropenem IVPB 1000 milliGRAM(s) IV Intermittent every 8 hours    Anticoagulants:  heparin  Injectable 5000 Unit(s) SubCutaneous every 8 hours    Cardiac:      Allergies    sulfa drugs (Unknown)  sulfamethoxazole (Other)    Intolerances        Vital Signs Last 24 Hrs  T(C): 37.2 (16 Aug 2017 09:34), Max: 38.4 (16 Aug 2017 05:46)  T(F): 99 (16 Aug 2017 09:34), Max: 101.2 (16 Aug 2017 05:46)  HR: 78 (16 Aug 2017 11:00) (74 - 88)  BP: 107/65 (16 Aug 2017 11:00) (107/65 - 142/82)  BP(mean): 81 (16 Aug 2017 11:00) (78 - 101)  RR: 23 (16 Aug 2017 11:00) (12 - 31)  SpO2: 93% (16 Aug 2017 11:00) (93% - 100%)    08-15 @ 07:01  -  08-16 @ 07:00  --------------------------------------------------------  IN: 2028.4 mL / OUT: 2640 mL / NET: -611.6 mL    08-16 @ 07:01 - 08-16 @ 11:35  --------------------------------------------------------  IN: 84.2 mL / OUT: 460 mL / NET: -375.8 mL          PHYSICAL EXAM:  General: Well developed; in no acute distress  HEENT: PERRL, EOMI, non icteric  Neck: Supple;   Respiratory: decreased BS on the L, rhonchi throughout  Cardiovascular: Regular rate and rhythm. S1 and S2 Normal;   Gastrointestinal: Soft, tender   Extremities: WWP, no edema, no cyanosis  Neurological: Alert and oriented x3  Skin: Warm and dry. No obvious rash    LABS:      CBC Full  -  ( 16 Aug 2017 05:23 )  WBC Count : 9.4 K/uL  Hemoglobin : 8.2 g/dL  Hematocrit : 25.9 %  Platelet Count - Automated : 352 K/uL  Mean Cell Volume : 90.2 fL  Mean Cell Hemoglobin : 28.6 pg  Mean Cell Hemoglobin Concentration : 31.7 g/dL  Auto Neutrophil # : x  Auto Lymphocyte # : x  Auto Monocyte # : x  Auto Eosinophil # : x  Auto Basophil # : x  Auto Neutrophil % : x  Auto Lymphocyte % : x  Auto Monocyte % : x  Auto Eosinophil % : x  Auto Basophil % : x    08-16    138  |  102  |  20  ----------------------------<  108<H>  4.3   |  27  |  0.50    Ca    8.5      16 Aug 2017 05:23  Phos  3.3     08-16  Mg     2.2     08-16                        RADIOLOGY imaging reviewed

## 2017-08-16 NOTE — CONSULT NOTE ADULT - SUBJECTIVE AND OBJECTIVE BOX
HPI: 57F in SICU s/p SBR resection, fistula resection, esophagojejunostomy revision w/ post-op course complicated by RTOR for distal anastomosis breakdown, ATN, acute respiratory failure, & septic shock. Recently extubated and noticed that her hearing is decreased bilaterally. No tinnitus, dizziness, pain or otorrhea. Denies prior history of ear issues/surgery. Of note, pt has had loop diuretics and gentamicin perioperatively.     Allergies    sulfa drugs (Unknown)  sulfamethoxazole (Other)    PAST MEDICAL & SURGICAL HISTORY:  Reflux  Obesity  DVT (deep venous thrombosis): 2013 neck  Diverticulitis  Hypertension  Elective surgery: abodominal wall surgery  dec 2016  Elective surgery: NOV 2016 gastric bypass revision  Gastric bypass status for obesity: gastric sleeve, 6/2012  S/P breast biopsy: 2011, left  S/P colon resection: 2011  S/P knee surgery: repair 2009, 2011  right; left  Umbilical hernia: 2000  S/P appendectomy: 1975  S/P tonsillectomy: 1967    REVIEW OF SYSTEMS    General:   NAD, comfortable at rest  conversant at normal speech volume    HEENT:	  -Neck: Central line  -Ear:          No pain, Pinna healthy external, no surgical scars         bilateral EAC patent, with no signs of erythema or discharge.          TM intact bilaterally, dull/pale yellow, moderately bulging         autoinsufflation +ve bilaterally           -Mouth and Throat: mild erythema in the posterior oropharyngeal wall as expected from recent intubation    MEDICATIONS:  Antiinfectives:   meropenem IVPB 1000 milliGRAM(s) IV Intermittent every 8 hours    Vital Signs Last 24 Hrs  T(C): 37.2 (16 Aug 2017 09:34), Max: 38.4 (16 Aug 2017 05:46)  T(F): 99 (16 Aug 2017 09:34), Max: 101.2 (16 Aug 2017 05:46)  HR: 78 (16 Aug 2017 11:00) (74 - 88)  BP: 107/65 (16 Aug 2017 11:00) (107/65 - 142/82)  BP(mean): 81 (16 Aug 2017 11:00) (78 - 101)  RR: 23 (16 Aug 2017 11:00) (12 - 31)  SpO2: 93% (16 Aug 2017 11:00) (93% - 100%)    Assessment/Plan:  57y Female with subjective bilaterally hearing loss with clinical findings consistent with non-infective serous effusion of the middle ear likely 2/2 intubation.  - nasal saline and flonase  - outpatient audiogram with follow up with ENT after discharge with audiogram  - Please page ENT at [phone] with any change in hearing, questions/concerns.

## 2017-08-16 NOTE — PROGRESS NOTE ADULT - ASSESSMENT
56 yo F with a complicated course post bariatric surgery earlier this year, during this admission with resolved septic shock and scute respiratory failure thought to be 2/2 to pneumonia or abdominal wound infection. Now with L mucus plugging.

## 2017-08-16 NOTE — PROGRESS NOTE ADULT - SUBJECTIVE AND OBJECTIVE BOX
SICU DAILY PROGRESS NOTE      INTERVAL HPI / OVERNIGHT EVENTS:   8/16: Li removed; TOV pending.  O/N: still no note from ENT  8/15: chlorothiazide 500 mg IV once. Per ID, stopped fluconazole & Zyvox; will continue meropenem 5 more days. ENT consulted for hearing loss. Left pleural effusion noted on CXR & bedside ultrasound. Seen by wound ostomy nurse Lucy: moisture barrier cream q12 hours and PRN and reposition pt q2 hours.    MEDICATIONS:  ---NEURO-  ondansetron Injectable 4 milliGRAM(s) IV Push every 6 hours PRN  HYDROmorphone  Injectable 1 milliGRAM(s) IV Push every 4 hours PRN  HYDROmorphone  Injectable 0.5 milliGRAM(s) IV Push every 4 hours PRN  ---GI/FEN-  pantoprazole  Injectable 40 milliGRAM(s) IV Push daily  dextrose 5%. 1000 milliLiter(s) IV Continuous <Continuous>  cyanocobalamin Injectable 1000 MICROGram(s) SubCutaneous every 7 days  calcitriol Injectable 1 MICROGram(s) IV Push <User Schedule>  Parenteral Nutrition - Adult 1 Each TPN Continuous <Continuous>  fat emulsion 20% Infusion 50 Gram(s) IV Continuous <Continuous>  ---ID-   meropenem IVPB 1000 milliGRAM(s) IV Intermittent every 8 hours  ---ENDO-  insulin lispro (HumaLOG) corrective regimen sliding scale   SubCutaneous every 6 hours  dextrose 50% Injectable 25 Gram(s) IV Push once  dextrose 50% Injectable 12.5 Gram(s) IV Push once  ---HEME-  heparin  Injectable 5000 Unit(s) SubCutaneous every 8 hours  ---OTHER-  sodium chloride 0.9% lock flush 10 milliLiter(s) IV Push every 1 hour PRN  sodium chloride 0.9% lock flush 10 milliLiter(s) IV Push every 12 hours PRN  sodium chloride 0.9% lock flush 20 milliLiter(s) IV Push once  nystatin Powder 1 Application(s) Topical two times a day  chlorhexidine 0.12% Liquid 15 milliLiter(s) Swish and Spit two times a day      CENTRAL LINE: YES - L subclavian (8/10-)             Remove: NO           Indication: TPN & venous access     LI: YES             Remove: YES           Indication: UOP monitoring in critically ill patient    A-LINE: NO    VOLUME STATUS:   I&O's Detail    15 Aug 2017 07:01  -  16 Aug 2017 07:00  --------------------------------------------------------  IN:    Fat Emulsion 20%: 270.8 mL    IV PiggyBack: 200 mL    TPN (Total Parenteral Nutrition): 1557.6 mL  Total IN: 2028.4 mL    OUT:    Bulb: 130 mL    Bulb: 100 mL    Drain: 40 mL    Indwelling Catheter - Urethral: 2145 mL    VAC (Vacuum Assisted Closure) System: 225 mL  Total OUT: 2640 mL    Total NET: -611.6 mL      16 Aug 2017 07:01  -  16 Aug 2017 09:20  --------------------------------------------------------  IN:    Fat Emulsion 20%: 20.8 mL    TPN (Total Parenteral Nutrition): 63.4 mL  Total IN: 84.2 mL    OUT:    Bulb: 30 mL    Bulb: 70 mL    Drain: 10 mL    Indwelling Catheter - Urethral: 175 mL  Total OUT: 285 mL    Total NET: -200.8 mL      VITALS:  ICU Vital Signs Last 24 Hrs  T(C): 38.4 (16 Aug 2017 05:46), Max: 38.4 (16 Aug 2017 05:46)  T(F): 101.2 (16 Aug 2017 05:46), Max: 101.2 (16 Aug 2017 05:46)  HR: 74 (16 Aug 2017 09:00) (74 - 88)  BP: 142/82 (16 Aug 2017 09:00) (97/50 - 142/82)  BP(mean): 101 (16 Aug 2017 09:00) (65 - 101)  ABP: --  ABP(mean): --  RR: 28 (16 Aug 2017 09:00) (12 - 31)  SpO2: 93% (16 Aug 2017 09:00) (93% - 100%)      CAPILLARY BLOOD GLUCOSE  108 (15 Aug 2017 22:00)  133 (15 Aug 2017 11:14)        PHYSICAL EXAM:    LABS:                        8.2    9.4   )-----------( 352      ( 16 Aug 2017 05:23 )             25.9     08-16    138  |  102  |  20  ----------------------------<  108<H>  4.3   |  27  |  0.50    Ca    8.5      16 Aug 2017 05:23  Phos  3.3     08-16  Mg     2.2     08-16            CULTURES: SICU DAILY PROGRESS NOTE      INTERVAL HPI / OVERNIGHT EVENTS:   8/16: Li removed; TOV pending.  O/N: still no note from ENT  8/15: chlorothiazide 500 mg IV once. Per ID, stopped fluconazole & Zyvox; will continue meropenem 5 more days. ENT consulted for hearing loss. Left pleural effusion noted on CXR & bedside ultrasound. Seen by wound ostomy nurse Lucy: moisture barrier cream q12 hours and PRN and reposition pt q2 hours.    MEDICATIONS:  ---NEURO-  ondansetron Injectable 4 milliGRAM(s) IV Push every 6 hours PRN  HYDROmorphone  Injectable 1 milliGRAM(s) IV Push every 4 hours PRN  HYDROmorphone  Injectable 0.5 milliGRAM(s) IV Push every 4 hours PRN  ---GI/FEN-  pantoprazole  Injectable 40 milliGRAM(s) IV Push daily  dextrose 5%. 1000 milliLiter(s) IV Continuous <Continuous>  cyanocobalamin Injectable 1000 MICROGram(s) SubCutaneous every 7 days  calcitriol Injectable 1 MICROGram(s) IV Push <User Schedule>  Parenteral Nutrition - Adult 1 Each TPN Continuous <Continuous>  fat emulsion 20% Infusion 50 Gram(s) IV Continuous <Continuous>  ---ID-   meropenem IVPB 1000 milliGRAM(s) IV Intermittent every 8 hours  ---ENDO-  insulin lispro (HumaLOG) corrective regimen sliding scale   SubCutaneous every 6 hours  dextrose 50% Injectable 25 Gram(s) IV Push once  dextrose 50% Injectable 12.5 Gram(s) IV Push once  ---HEME-  heparin  Injectable 5000 Unit(s) SubCutaneous every 8 hours  ---OTHER-  sodium chloride 0.9% lock flush 10 milliLiter(s) IV Push every 1 hour PRN  sodium chloride 0.9% lock flush 10 milliLiter(s) IV Push every 12 hours PRN  sodium chloride 0.9% lock flush 20 milliLiter(s) IV Push once  nystatin Powder 1 Application(s) Topical two times a day  chlorhexidine 0.12% Liquid 15 milliLiter(s) Swish and Spit two times a day      CENTRAL LINE: YES - L subclavian (8/10-)             Remove: NO           Indication: TPN & venous access     LI: YES             Remove: YES           Indication: UOP monitoring in critically ill patient    A-LINE: NO    VOLUME STATUS:   I&O's Detail    15 Aug 2017 07:01  -  16 Aug 2017 07:00  --------------------------------------------------------  IN:    Fat Emulsion 20%: 270.8 mL    IV PiggyBack: 200 mL    TPN (Total Parenteral Nutrition): 1557.6 mL  Total IN: 2028.4 mL    OUT:    Bulb: 130 mL    Bulb: 100 mL    Drain: 40 mL    Indwelling Catheter - Urethral: 2145 mL    VAC (Vacuum Assisted Closure) System: 225 mL  Total OUT: 2640 mL    Total NET: -611.6 mL      16 Aug 2017 07:01  -  16 Aug 2017 09:20  --------------------------------------------------------  IN:    Fat Emulsion 20%: 20.8 mL    TPN (Total Parenteral Nutrition): 63.4 mL  Total IN: 84.2 mL    OUT:    Bulb: 30 mL    Bulb: 70 mL    Drain: 10 mL    Indwelling Catheter - Urethral: 175 mL  Total OUT: 285 mL    Total NET: -200.8 mL      VITALS:  ICU Vital Signs Last 24 Hrs  T(C): 38.4 (16 Aug 2017 05:46), Max: 38.4 (16 Aug 2017 05:46)  T(F): 101.2 (16 Aug 2017 05:46), Max: 101.2 (16 Aug 2017 05:46)  HR: 74 (16 Aug 2017 09:00) (74 - 88)  BP: 142/82 (16 Aug 2017 09:00) (97/50 - 142/82)  BP(mean): 101 (16 Aug 2017 09:00) (65 - 101)  ABP: --  ABP(mean): --  RR: 28 (16 Aug 2017 09:00) (12 - 31)  SpO2: 93% (16 Aug 2017 09:00) (93% - 100%)      CAPILLARY BLOOD GLUCOSE  108 (15 Aug 2017 22:00)  133 (15 Aug 2017 11:14)      PHYSICAL EXAM:  NEURO: A&Ox3, NAD, hearing loss improving.  HEENT: PERRL, MMM  CV: RRR  PULM: CTAB; small L-sided pleural effusion noted on bedside US-- not large enough to merit drainage.  ABD: Softly distended, less TTP. LLQ malecot drain to gravity- bilious output (expected as drain is within bowel lumen); LLQ & LUQ CHEOC drains- minimal dark/maroon output. Midline abd wound vac in place w/ good seal; SS output in canister.  : Li- clear yellow urine.   EXTR: WWP. No edema.  VASC: 2+ radial, 1+ DP pulses  MSK: No joint swelling.   SKIN: No rash. Stage 2 sacral pressure ulcer present but does not appear infected.  Psych: affect appropriate      LABS:                        8.2    9.4   )-----------( 352      ( 16 Aug 2017 05:23 )             25.9     138  |  102  |  20  ----------------------------<  108<H>  4.3   |  27  |  0.50    Ca    8.5      16 Aug 2017 05:23  Phos  3.3     08-16  Mg     2.2     08-16            CULTURES: SICU DAILY PROGRESS NOTE      INTERVAL HPI / OVERNIGHT EVENTS:   8/16: Li removed; TOV pending.  O/N: still no note from ENT  8/15: chlorothiazide 500 mg IV once. Per ID, stopped fluconazole & Zyvox; will continue meropenem 5 more days. ENT consulted for hearing loss. Left pleural effusion noted on CXR & bedside ultrasound. Seen by wound ostomy nurse Lucy: moisture barrier cream q12 hours and PRN and reposition pt q2 hours.    MEDICATIONS:  ---NEURO-  ondansetron Injectable 4 milliGRAM(s) IV Push every 6 hours PRN  HYDROmorphone  Injectable 1 milliGRAM(s) IV Push every 4 hours PRN  HYDROmorphone  Injectable 0.5 milliGRAM(s) IV Push every 4 hours PRN  ---GI/FEN-  pantoprazole  Injectable 40 milliGRAM(s) IV Push daily  dextrose 5%. 1000 milliLiter(s) IV Continuous <Continuous>  cyanocobalamin Injectable 1000 MICROGram(s) SubCutaneous every 7 days  calcitriol Injectable 1 MICROGram(s) IV Push <User Schedule>  Parenteral Nutrition - Adult 1 Each TPN Continuous <Continuous>  fat emulsion 20% Infusion 50 Gram(s) IV Continuous <Continuous>  ---ID-   meropenem IVPB 1000 milliGRAM(s) IV Intermittent every 8 hours  ---ENDO-  insulin lispro (HumaLOG) corrective regimen sliding scale   SubCutaneous every 6 hours  dextrose 50% Injectable 25 Gram(s) IV Push once  dextrose 50% Injectable 12.5 Gram(s) IV Push once  ---HEME-  heparin  Injectable 5000 Unit(s) SubCutaneous every 8 hours  ---OTHER-  sodium chloride 0.9% lock flush 10 milliLiter(s) IV Push every 1 hour PRN  sodium chloride 0.9% lock flush 10 milliLiter(s) IV Push every 12 hours PRN  sodium chloride 0.9% lock flush 20 milliLiter(s) IV Push once  nystatin Powder 1 Application(s) Topical two times a day  chlorhexidine 0.12% Liquid 15 milliLiter(s) Swish and Spit two times a day      CENTRAL LINE: YES - L subclavian (8/10-)             Remove: NO           Indication: TPN & venous access     LI: YES             Remove: YES           Indication: UOP monitoring in critically ill patient    A-LINE: NO    VOLUME STATUS:   I&O's Detail    15 Aug 2017 07:01  -  16 Aug 2017 07:00  --------------------------------------------------------  IN:    Fat Emulsion 20%: 270.8 mL    IV PiggyBack: 200 mL    TPN (Total Parenteral Nutrition): 1557.6 mL  Total IN: 2028.4 mL    OUT:    Bulb: 130 mL    Bulb: 100 mL    Drain: 40 mL    Indwelling Catheter - Urethral: 2145 mL    VAC (Vacuum Assisted Closure) System: 225 mL  Total OUT: 2640 mL    Total NET: -611.6 mL      16 Aug 2017 07:01  -  16 Aug 2017 09:20  --------------------------------------------------------  IN:    Fat Emulsion 20%: 20.8 mL    TPN (Total Parenteral Nutrition): 63.4 mL  Total IN: 84.2 mL    OUT:    Bulb: 30 mL    Bulb: 70 mL    Drain: 10 mL    Indwelling Catheter - Urethral: 175 mL  Total OUT: 285 mL    Total NET: -200.8 mL      VITALS:  ICU Vital Signs Last 24 Hrs  T(C): 38.4 (16 Aug 2017 05:46), Max: 38.4 (16 Aug 2017 05:46)  T(F): 101.2 (16 Aug 2017 05:46), Max: 101.2 (16 Aug 2017 05:46)  HR: 74 (16 Aug 2017 09:00) (74 - 88)  BP: 142/82 (16 Aug 2017 09:00) (97/50 - 142/82)  BP(mean): 101 (16 Aug 2017 09:00) (65 - 101)  ABP: --  ABP(mean): --  RR: 28 (16 Aug 2017 09:00) (12 - 31)  SpO2: 93% (16 Aug 2017 09:00) (93% - 100%)      CAPILLARY BLOOD GLUCOSE  108 (15 Aug 2017 22:00)  133 (15 Aug 2017 11:14)      PHYSICAL EXAM:  NEURO: A&Ox3, NAD, hearing loss improving.  HEENT: PERRL, MMM  CV: RRR  PULM: CTAB; small L-sided pleural effusion noted on bedside US.  ABD: Softly distended, less TTP. LLQ malecot drain to gravity- bilious output (expected as drain is within bowel lumen); LLQ & LUQ CHECO drains- minimal dark/maroon output. Midline abd wound vac in place w/ good seal; SS output in canister.  : Li- clear yellow urine.   EXTR: WWP. No edema.  VASC: 2+ radial, 1+ DP pulses  MSK: No joint swelling.   SKIN: No rash. Stage 2 sacral pressure ulcer present but does not appear infected.  Psych: Affect appropriate      LABS:                        8.2    9.4   )-----------( 352      ( 16 Aug 2017 05:23 )             25.9     138  |  102  |  20  ----------------------------<  108<H>  4.3   |  27  |  0.50    Ca    8.5      16 Aug 2017 05:23  Phos  3.3     08-16  Mg     2.2     08-16 SICU DAILY PROGRESS NOTE      INTERVAL HPI / OVERNIGHT EVENTS:   8/16: Li removed; TOV pending.  O/N: still no note from ENT but hearing seems better to her. Denies SOB, CP.  8/15: chlorothiazide 500 mg IV once. Per ID, stopped fluconazole & Zyvox; will continue meropenem 5 more days. ENT consulted for hearing loss. Left pleural effusion noted on CXR & bedside ultrasound. Seen by wound ostomy nurse Lucy: moisture barrier cream q12 hours and PRN and reposition pt q2 hours.    MEDICATIONS:  ---NEURO-  ondansetron Injectable 4 milliGRAM(s) IV Push every 6 hours PRN  HYDROmorphone  Injectable 1 milliGRAM(s) IV Push every 4 hours PRN  HYDROmorphone  Injectable 0.5 milliGRAM(s) IV Push every 4 hours PRN  ---GI/FEN-  pantoprazole  Injectable 40 milliGRAM(s) IV Push daily  dextrose 5%. 1000 milliLiter(s) IV Continuous <Continuous>  cyanocobalamin Injectable 1000 MICROGram(s) SubCutaneous every 7 days  calcitriol Injectable 1 MICROGram(s) IV Push <User Schedule>  Parenteral Nutrition - Adult 1 Each TPN Continuous <Continuous>  fat emulsion 20% Infusion 50 Gram(s) IV Continuous <Continuous>  ---ID-   meropenem IVPB 1000 milliGRAM(s) IV Intermittent every 8 hours  ---ENDO-  insulin lispro (HumaLOG) corrective regimen sliding scale   SubCutaneous every 6 hours  dextrose 50% Injectable 25 Gram(s) IV Push once  dextrose 50% Injectable 12.5 Gram(s) IV Push once  ---HEME-  heparin  Injectable 5000 Unit(s) SubCutaneous every 8 hours  ---OTHER-  sodium chloride 0.9% lock flush 10 milliLiter(s) IV Push every 1 hour PRN  sodium chloride 0.9% lock flush 10 milliLiter(s) IV Push every 12 hours PRN  sodium chloride 0.9% lock flush 20 milliLiter(s) IV Push once  nystatin Powder 1 Application(s) Topical two times a day  chlorhexidine 0.12% Liquid 15 milliLiter(s) Swish and Spit two times a day      CENTRAL LINE: YES - L subclavian (8/10-)             Remove: NO           Indication: TPN & venous access     LI: YES             Remove: YES           Indication: UOP monitoring in critically ill patient    A-LINE: NO    VOLUME STATUS:   I&O's Detail    15 Aug 2017 07:01  -  16 Aug 2017 07:00  --------------------------------------------------------  IN:    Fat Emulsion 20%: 270.8 mL    IV PiggyBack: 200 mL    TPN (Total Parenteral Nutrition): 1557.6 mL  Total IN: 2028.4 mL    OUT:    Bulb: 130 mL    Bulb: 100 mL    Drain: 40 mL    Indwelling Catheter - Urethral: 2145 mL    VAC (Vacuum Assisted Closure) System: 225 mL  Total OUT: 2640 mL    Total NET: -611.6 mL      16 Aug 2017 07:01  -  16 Aug 2017 09:20  --------------------------------------------------------  IN:    Fat Emulsion 20%: 20.8 mL    TPN (Total Parenteral Nutrition): 63.4 mL  Total IN: 84.2 mL    OUT:    Bulb: 30 mL    Bulb: 70 mL    Drain: 10 mL    Indwelling Catheter - Urethral: 175 mL  Total OUT: 285 mL    Total NET: -200.8 mL      VITALS:  ICU Vital Signs Last 24 Hrs  T(C): 38.4 (16 Aug 2017 05:46), Max: 38.4 (16 Aug 2017 05:46)  T(F): 101.2 (16 Aug 2017 05:46), Max: 101.2 (16 Aug 2017 05:46)  HR: 74 (16 Aug 2017 09:00) (74 - 88)  BP: 142/82 (16 Aug 2017 09:00) (97/50 - 142/82)  BP(mean): 101 (16 Aug 2017 09:00) (65 - 101)  ABP: --  ABP(mean): --  RR: 28 (16 Aug 2017 09:00) (12 - 31)  SpO2: 93% (16 Aug 2017 09:00) (93% - 100%)      CAPILLARY BLOOD GLUCOSE  108 (15 Aug 2017 22:00)  133 (15 Aug 2017 11:14)      PHYSICAL EXAM:  NEURO: A&Ox3, NAD but slight tachypnea, hearing loss improving.  HEENT: PERRL, MMM  CV: RRR  PULM: CTAB; small L-sided pleural effusion noted on bedside US with consolidation on left.  ABD: Softly distended, less TTP. LLQ malecot drain to gravity- bilious output (expected as drain is within bowel lumen); LLQ & LUQ CHECO drains- minimal dark/maroon output. Midline abd wound vac in place w/ good seal; SS output in canister.  : Li- clear yellow urine.   EXTR: WWP. No edema.  VASC: 2+ radial, 1+ DP pulses  MSK: No joint swelling.   SKIN: No rash. Stage 2 sacral pressure ulcer present but does not appear infected.  Psych: Affect appropriate      LABS:                        8.2    9.4   )-----------( 352      ( 16 Aug 2017 05:23 )             25.9     138  |  102  |  20  ----------------------------<  108<H>  4.3   |  27  |  0.50    Ca    8.5      16 Aug 2017 05:23  Phos  3.3     08-16  Mg     2.2     08-16    CXR left sided white-out

## 2017-08-16 NOTE — PROGRESS NOTE ADULT - PROBLEM SELECTOR PLAN 1
CXR with L lung opacification, bedside US with no significant L pleural effusion.  Bedside bronchoscopy this AM with mucus plugging of the L main bronchus, aspirated with open AW and minimal secretions remaining.

## 2017-08-16 NOTE — PROGRESS NOTE ADULT - ATTENDING COMMENTS
Patient seen and examined with house-staff during bedside rounds.  Resident note read, including vitals, physical findings, laboratory data, and radiological reports.   Revisions included below.  Direct personal management at bed side and extensive interpretation of the data.  Plan was outlined and discussed in details with the housestaff.  Decision making of high complexity.  The patient is Stable postprocedure and the chest x-ray Revealed reexpansion of the left lung. Is no clinical evidence of pneumonia .Patient is on antibiotic currently Follow cultures. I discussed the case with the

## 2017-08-17 LAB
ANION GAP SERPL CALC-SCNC: 10 MMOL/L — SIGNIFICANT CHANGE UP (ref 5–17)
BUN SERPL-MCNC: 19 MG/DL — SIGNIFICANT CHANGE UP (ref 7–23)
CALCIUM SERPL-MCNC: 8.4 MG/DL — SIGNIFICANT CHANGE UP (ref 8.4–10.5)
CHLORIDE SERPL-SCNC: 102 MMOL/L — SIGNIFICANT CHANGE UP (ref 96–108)
CO2 SERPL-SCNC: 25 MMOL/L — SIGNIFICANT CHANGE UP (ref 22–31)
CREAT SERPL-MCNC: 0.5 MG/DL — SIGNIFICANT CHANGE UP (ref 0.5–1.3)
CULTURE RESULTS: SIGNIFICANT CHANGE UP
GLUCOSE SERPL-MCNC: 163 MG/DL — HIGH (ref 70–99)
GRAM STN FLD: SIGNIFICANT CHANGE UP
HCT VFR BLD CALC: 24.7 % — LOW (ref 34.5–45)
HGB BLD-MCNC: 7.9 G/DL — LOW (ref 11.5–15.5)
MAGNESIUM SERPL-MCNC: 2.2 MG/DL — SIGNIFICANT CHANGE UP (ref 1.6–2.6)
MCHC RBC-ENTMCNC: 28.6 PG — SIGNIFICANT CHANGE UP (ref 27–34)
MCHC RBC-ENTMCNC: 32 G/DL — SIGNIFICANT CHANGE UP (ref 32–36)
MCV RBC AUTO: 89.5 FL — SIGNIFICANT CHANGE UP (ref 80–100)
NIGHT BLUE STAIN TISS: SIGNIFICANT CHANGE UP
PHOSPHATE SERPL-MCNC: 3.8 MG/DL — SIGNIFICANT CHANGE UP (ref 2.5–4.5)
PLATELET # BLD AUTO: 336 K/UL — SIGNIFICANT CHANGE UP (ref 150–400)
POTASSIUM SERPL-MCNC: 4.4 MMOL/L — SIGNIFICANT CHANGE UP (ref 3.5–5.3)
POTASSIUM SERPL-SCNC: 4.4 MMOL/L — SIGNIFICANT CHANGE UP (ref 3.5–5.3)
RBC # BLD: 2.76 M/UL — LOW (ref 3.8–5.2)
RBC # FLD: 16.6 % — SIGNIFICANT CHANGE UP (ref 10.3–16.9)
SODIUM SERPL-SCNC: 137 MMOL/L — SIGNIFICANT CHANGE UP (ref 135–145)
SPECIMEN SOURCE: SIGNIFICANT CHANGE UP
SPECIMEN SOURCE: SIGNIFICANT CHANGE UP
WBC # BLD: 9.8 K/UL — SIGNIFICANT CHANGE UP (ref 3.8–10.5)
WBC # FLD AUTO: 9.8 K/UL — SIGNIFICANT CHANGE UP (ref 3.8–10.5)

## 2017-08-17 PROCEDURE — 71010: CPT | Mod: 26

## 2017-08-17 PROCEDURE — 99233 SBSQ HOSP IP/OBS HIGH 50: CPT | Mod: GC

## 2017-08-17 RX ORDER — ELECTROLYTE SOLUTION,INJ
1 VIAL (ML) INTRAVENOUS
Qty: 0 | Refills: 0 | Status: DISCONTINUED | OUTPATIENT
Start: 2017-08-17 | End: 2017-08-17

## 2017-08-17 RX ORDER — DIPHENHYDRAMINE HCL 50 MG
25 CAPSULE ORAL ONCE
Qty: 0 | Refills: 0 | Status: COMPLETED | OUTPATIENT
Start: 2017-08-17 | End: 2017-08-18

## 2017-08-17 RX ORDER — HYDROMORPHONE HYDROCHLORIDE 2 MG/ML
1 INJECTION INTRAMUSCULAR; INTRAVENOUS; SUBCUTANEOUS EVERY 4 HOURS
Qty: 0 | Refills: 0 | Status: DISCONTINUED | OUTPATIENT
Start: 2017-08-17 | End: 2017-08-24

## 2017-08-17 RX ORDER — I.V. FAT EMULSION 20 G/100ML
50 EMULSION INTRAVENOUS
Qty: 0 | Refills: 0 | Status: DISCONTINUED | OUTPATIENT
Start: 2017-08-17 | End: 2017-08-17

## 2017-08-17 RX ORDER — HYDROMORPHONE HYDROCHLORIDE 2 MG/ML
0.5 INJECTION INTRAMUSCULAR; INTRAVENOUS; SUBCUTANEOUS EVERY 4 HOURS
Qty: 0 | Refills: 0 | Status: DISCONTINUED | OUTPATIENT
Start: 2017-08-17 | End: 2017-08-24

## 2017-08-17 RX ADMIN — I.V. FAT EMULSION 20.83 GRAM(S): 20 EMULSION INTRAVENOUS at 22:54

## 2017-08-17 RX ADMIN — NYSTATIN CREAM 1 APPLICATION(S): 100000 CREAM TOPICAL at 06:53

## 2017-08-17 RX ADMIN — HYDROMORPHONE HYDROCHLORIDE 1 MILLIGRAM(S): 2 INJECTION INTRAMUSCULAR; INTRAVENOUS; SUBCUTANEOUS at 22:10

## 2017-08-17 RX ADMIN — HEPARIN SODIUM 5000 UNIT(S): 5000 INJECTION INTRAVENOUS; SUBCUTANEOUS at 06:00

## 2017-08-17 RX ADMIN — Medication 1 EACH: at 19:52

## 2017-08-17 RX ADMIN — MEROPENEM 100 MILLIGRAM(S): 1 INJECTION INTRAVENOUS at 13:38

## 2017-08-17 RX ADMIN — PANTOPRAZOLE SODIUM 40 MILLIGRAM(S): 20 TABLET, DELAYED RELEASE ORAL at 13:37

## 2017-08-17 RX ADMIN — Medication 400 MILLIGRAM(S): at 00:42

## 2017-08-17 RX ADMIN — HYDROMORPHONE HYDROCHLORIDE 1 MILLIGRAM(S): 2 INJECTION INTRAMUSCULAR; INTRAVENOUS; SUBCUTANEOUS at 16:53

## 2017-08-17 RX ADMIN — HYDROMORPHONE HYDROCHLORIDE 1 MILLIGRAM(S): 2 INJECTION INTRAMUSCULAR; INTRAVENOUS; SUBCUTANEOUS at 05:45

## 2017-08-17 RX ADMIN — HYDROMORPHONE HYDROCHLORIDE 1 MILLIGRAM(S): 2 INJECTION INTRAMUSCULAR; INTRAVENOUS; SUBCUTANEOUS at 06:54

## 2017-08-17 RX ADMIN — HYDROMORPHONE HYDROCHLORIDE 1 MILLIGRAM(S): 2 INJECTION INTRAMUSCULAR; INTRAVENOUS; SUBCUTANEOUS at 11:57

## 2017-08-17 RX ADMIN — HYDROMORPHONE HYDROCHLORIDE 1 MILLIGRAM(S): 2 INJECTION INTRAMUSCULAR; INTRAVENOUS; SUBCUTANEOUS at 09:46

## 2017-08-17 RX ADMIN — HEPARIN SODIUM 5000 UNIT(S): 5000 INJECTION INTRAVENOUS; SUBCUTANEOUS at 13:38

## 2017-08-17 RX ADMIN — MEROPENEM 100 MILLIGRAM(S): 1 INJECTION INTRAVENOUS at 06:00

## 2017-08-17 RX ADMIN — HEPARIN SODIUM 5000 UNIT(S): 5000 INJECTION INTRAVENOUS; SUBCUTANEOUS at 22:50

## 2017-08-17 RX ADMIN — HYDROMORPHONE HYDROCHLORIDE 1 MILLIGRAM(S): 2 INJECTION INTRAMUSCULAR; INTRAVENOUS; SUBCUTANEOUS at 16:15

## 2017-08-17 RX ADMIN — NYSTATIN CREAM 1 APPLICATION(S): 100000 CREAM TOPICAL at 17:32

## 2017-08-17 RX ADMIN — MEROPENEM 100 MILLIGRAM(S): 1 INJECTION INTRAVENOUS at 22:52

## 2017-08-17 RX ADMIN — CHLORHEXIDINE GLUCONATE 15 MILLILITER(S): 213 SOLUTION TOPICAL at 06:53

## 2017-08-17 NOTE — PROGRESS NOTE ADULT - ATTENDING COMMENTS
Patient seen and examined with house-staff during bedside rounds.  Resident note read, including vitals, physical findings, laboratory data, and radiological reports.   Revisions included below.  Direct personal management at bed side and extensive interpretation of the data.  Plan was outlined and discussed in details with the housestaff.  Decision making of high complexity  Discussed the case with surgery and the family in details. The chest x-ray revealed reexpansion off the left lung. There is a small left pleural effusion with no indication for thoracentesis.  Continue TPN all blood sugar my need to adjust insulin. Patient it physical therapy.  Continue current antibiotic. Continue abdominal drainage.

## 2017-08-17 NOTE — CHART NOTE - NSCHARTNOTEFT_GEN_A_CORE
Admitting Diagnosis:   57 F s/p  SBR resection, fistula resection, esophagojejunostomy revision w/ post-op course complicated by RTOR for distal anastomosis breakdown, ATN, acute respiratory failure, & septic shock.     PAST MEDICAL & SURGICAL HISTORY:  Reflux  Obesity  DVT (deep venous thrombosis): 2013 neck  Diverticulitis  Hypertension  Elective surgery: abodominal wall surgery  dec 2016  Elective surgery: NOV 2016 gastric bypass revision  Gastric bypass status for obesity: gastric sleeve, 6/2012  S/P breast biopsy: 2011, left  S/P colon resection: 2011  S/P knee surgery: repair 2009, 2011  right; left  Umbilical hernia: 2000  S/P appendectomy: 1975  S/P tonsillectomy: 1967    Current Nutrition Order: TPN: central line: 254 g D, 104 g AA, 50 g lipids, & 1771 mL fluid. Providing pt with 1770 kcal, 104 g protein, & 1858 mL fluid. GIR = 1.8    GI Issues: WDL     Pain: None reported    Skin Integrity: Stage 3 -sacrum    Labs:   08-17    137  |  102  |  19  ----------------------------<  163<H>  4.4   |  25  |  0.50    Ca    8.4      17 Aug 2017 05:07  Phos  3.8     08-17  Mg     2.2     08-17      CAPILLARY BLOOD GLUCOSE  119 (16 Aug 2017 16:00)    Medications:  MEDICATIONS  (STANDING):  insulin lispro (HumaLOG) corrective regimen sliding scale   SubCutaneous every 6 hours  dextrose 5%. 1000 milliLiter(s) (50 mL/Hr) IV Continuous <Continuous>  dextrose 50% Injectable 25 Gram(s) IV Push once  sodium chloride 0.9% lock flush 20 milliLiter(s) IV Push once  cyanocobalamin Injectable 1000 MICROGram(s) SubCutaneous every 7 days  dextrose 50% Injectable 12.5 Gram(s) IV Push once  nystatin Powder 1 Application(s) Topical two times a day  pantoprazole  Injectable 40 milliGRAM(s) IV Push daily  calcitriol Injectable 1 MICROGram(s) IV Push <User Schedule>  chlorhexidine 0.12% Liquid 15 milliLiter(s) Swish and Spit two times a day  meropenem IVPB 1000 milliGRAM(s) IV Intermittent every 8 hours  heparin  Injectable 5000 Unit(s) SubCutaneous every 8 hours  fat emulsion 20% Infusion 50 Gram(s) (20.83 mL/Hr) IV Continuous <Continuous>  Parenteral Nutrition - Adult 1 Each (63 mL/Hr) TPN Continuous <Continuous>  fat emulsion 20% Infusion 50 Gram(s) (20.83 mL/Hr) IV Continuous <Continuous>  Parenteral Nutrition - Adult 1 Each (63 mL/Hr) TPN Continuous <Continuous>  fat emulsion 20% Infusion 50 Gram(s) (20.83 mL/Hr) IV Continuous <Continuous>    MEDICATIONS  (PRN):  ondansetron Injectable 4 milliGRAM(s) IV Push every 6 hours PRN Nausea  sodium chloride 0.9% lock flush 10 milliLiter(s) IV Push every 1 hour PRN After each medication administration  sodium chloride 0.9% lock flush 10 milliLiter(s) IV Push every 12 hours PRN Lumen of catheter NOT used  HYDROmorphone  Injectable 1 milliGRAM(s) IV Push every 4 hours PRN Severe Pain (7 - 10)  HYDROmorphone  Injectable 0.5 milliGRAM(s) IV Push every 4 hours PRN Moderate Pain (4 - 6)      Weight:  No new weights to assess    Estimated energy needs using 52 kg IBW:  30-35 kcal/kg (9353-0991 kcal).   1.8-2 g/kg ( g protein).  30-35 mL/kg (0813-2543 mL fluid).     Subjective: N/A this visit, pt lethargic at time of visit. TPN infusing. Pt extubated since last RD visit.     Previous Nutrition Diagnosis:  Increased nutrient needs RT fistula & S/p surgery AEB 1.8-2 g/kg protein.     Active [ X  ]  Resolved [   ]    Goal: Continue to meet % of needs via TPN    Recommendations: Continue TPN as ordered.     Education: N/A this visit    Risk Level: High [ X  ] Moderate [   ] Low [   ]

## 2017-08-17 NOTE — PROGRESS NOTE ADULT - SUBJECTIVE AND OBJECTIVE BOX
SICU DAILY PROGRESS NOTE      INTERVAL HPI / OVERNIGHT EVENTS:       MEDICATIONS:  ---NEURO-  ondansetron Injectable 4 milliGRAM(s) IV Push every 6 hours PRN  HYDROmorphone  Injectable 1 milliGRAM(s) IV Push every 4 hours PRN  HYDROmorphone  Injectable 0.5 milliGRAM(s) IV Push every 4 hours PRN  ---CV-  ---PULM-  ---GI/FEN-  pantoprazole  Injectable 40 milliGRAM(s) IV Push daily  dextrose 5%. 1000 milliLiter(s) IV Continuous <Continuous>  cyanocobalamin Injectable 1000 MICROGram(s) SubCutaneous every 7 days  calcitriol Injectable 1 MICROGram(s) IV Push <User Schedule>  fat emulsion 20% Infusion 50 Gram(s) IV Continuous <Continuous>  Parenteral Nutrition - Adult 1 Each TPN Continuous <Continuous>  fat emulsion 20% Infusion 50 Gram(s) IV Continuous <Continuous>  Parenteral Nutrition - Adult 1 Each TPN Continuous <Continuous>  fat emulsion 20% Infusion 50 Gram(s) IV Continuous <Continuous>  ----  ---ID-   meropenem IVPB 1000 milliGRAM(s) IV Intermittent every 8 hours  ---ENDO-  insulin lispro (HumaLOG) corrective regimen sliding scale   SubCutaneous every 6 hours  dextrose 50% Injectable 25 Gram(s) IV Push once  dextrose 50% Injectable 12.5 Gram(s) IV Push once  ---HEME-  heparin  Injectable 5000 Unit(s) SubCutaneous every 8 hours  ---OTHER-  sodium chloride 0.9% lock flush 10 milliLiter(s) IV Push every 1 hour PRN  sodium chloride 0.9% lock flush 10 milliLiter(s) IV Push every 12 hours PRN  sodium chloride 0.9% lock flush 20 milliLiter(s) IV Push once  nystatin Powder 1 Application(s) Topical two times a day  chlorhexidine 0.12% Liquid 15 milliLiter(s) Swish and Spit two times a day      CENTRAL LINE -- LOCATION, DATE INSERTED:      Remove: NO     Indication: Venous access in critically ill patient.    ARTERIAL LINE:      Remove: NO     Indication: Hemodynamic monitoring in critically ill patient.     URINARY CATHETER:      Remove: NO     Indication: I/O monitoring in critically ill patient.     VOLUME STATUS:   I&O's Detail    16 Aug 2017 07:01  -  17 Aug 2017 07:00  --------------------------------------------------------  IN:    Fat Emulsion 20%: 314 mL    IV PiggyBack: 50 mL    TPN (Total Parenteral Nutrition): 1522.5 mL  Total IN: 1886.5 mL    OUT:    Bulb: 48 mL    Bulb: 280 mL    Drain: 70 mL    Indwelling Catheter - Urethral: 550 mL    VAC (Vacuum Assisted Closure) System: 185 mL    Voided: 650 mL  Total OUT: 1783 mL    Total NET: 103.5 mL      VITALS:  ICU Vital Signs Last 24 Hrs  T(C): 37.8 (17 Aug 2017 06:00), Max: 38.6 (16 Aug 2017 21:30)  T(F): 100.1 (17 Aug 2017 06:00), Max: 101.5 (16 Aug 2017 21:30)  HR: 84 (17 Aug 2017 10:00) (74 - 84)  BP: 137/68 (17 Aug 2017 10:00) (105/64 - 155/76)  BP(mean): 93 (17 Aug 2017 10:00) (78 - 113)  ABP: --  ABP(mean): --  RR: 26 (17 Aug 2017 10:00) (20 - 33)  SpO2: 95% (17 Aug 2017 10:00) (88% - 100%)      CAPILLARY BLOOD GLUCOSE  119 (16 Aug 2017 16:00)  128 (16 Aug 2017 12:00)        PHYSICAL EXAM:    LABS:                        7.9    9.8   )-----------( 336      ( 17 Aug 2017 05:07 )             24.7     08-17    137  |  102  |  19  ----------------------------<  163<H>  4.4   |  25  |  0.50    Ca    8.4      17 Aug 2017 05:07  Phos  3.8     08-17  Mg     2.2     08-17            CULTURES: SICU DAILY PROGRESS NOTE      INTERVAL HPI / OVERNIGHT EVENTS:   8/17: LUQ CHECO draining succus w/ particulate matter 280cc/24h, vs. malecot drain in bowel lumen draining 70cc/24h. Suspect chemical peritonitis as source of persistent fevers.    O/N: Tm 101.5, tylenol given  8/16: Li removed; Passed TOV. Hearing loss improved. CXR showed complete L lung white out - bronch showed mucus plug, no pna. wound vac changed at bedside.     MEDICATIONS:  ---NEURO-  ondansetron Injectable 4 milliGRAM(s) IV Push every 6 hours PRN  HYDROmorphone  Injectable 1 milliGRAM(s) IV Push every 4 hours PRN  HYDROmorphone  Injectable 0.5 milliGRAM(s) IV Push every 4 hours PRN  ---GI/FEN-  pantoprazole  Injectable 40 milliGRAM(s) IV Push daily  dextrose 5%. 1000 milliLiter(s) IV Continuous <Continuous>  cyanocobalamin Injectable 1000 MICROGram(s) SubCutaneous every 7 days  calcitriol Injectable 1 MICROGram(s) IV Push <User Schedule>  fat emulsion 20% Infusion 50 Gram(s) IV Continuous <Continuous>  Parenteral Nutrition - Adult 1 Each TPN Continuous <Continuous>  fat emulsion 20% Infusion 50 Gram(s) IV Continuous <Continuous>  Parenteral Nutrition - Adult 1 Each TPN Continuous <Continuous>  fat emulsion 20% Infusion 50 Gram(s) IV Continuous <Continuous>  ---ID-   meropenem IVPB 1000 milliGRAM(s) IV Intermittent every 8 hours  ---ENDO-  insulin lispro (HumaLOG) corrective regimen sliding scale   SubCutaneous every 6 hours  dextrose 50% Injectable 25 Gram(s) IV Push once  dextrose 50% Injectable 12.5 Gram(s) IV Push once  ---HEME-  heparin  Injectable 5000 Unit(s) SubCutaneous every 8 hours  ---OTHER-  sodium chloride 0.9% lock flush 10 milliLiter(s) IV Push every 1 hour PRN  sodium chloride 0.9% lock flush 10 milliLiter(s) IV Push every 12 hours PRN  sodium chloride 0.9% lock flush 20 milliLiter(s) IV Push once  nystatin Powder 1 Application(s) Topical two times a day  chlorhexidine 0.12% Liquid 15 milliLiter(s) Swish and Spit two times a day      CENTRAL LINE: YES - L subclavian (8/10-)             Remove: NO           Indication: TPN & venous access     LI: YES             Remove: YES           Indication: UOP monitoring in critically ill patient    A-LINE: NO      VOLUME STATUS:   I&O's Detail    16 Aug 2017 07:01  -  17 Aug 2017 07:00  --------------------------------------------------------  IN:    Fat Emulsion 20%: 314 mL    IV PiggyBack: 50 mL    TPN (Total Parenteral Nutrition): 1522.5 mL  Total IN: 1886.5 mL    OUT:    Bulb: 48 mL    Bulb: 280 mL    Drain: 70 mL    Indwelling Catheter - Urethral: 550 mL    VAC (Vacuum Assisted Closure) System: 185 mL    Voided: 650 mL  Total OUT: 1783 mL    Total NET: 103.5 mL      VITALS:  ICU Vital Signs Last 24 Hrs  T(C): 37.8 (17 Aug 2017 06:00), Max: 38.6 (16 Aug 2017 21:30)  T(F): 100.1 (17 Aug 2017 06:00), Max: 101.5 (16 Aug 2017 21:30)  HR: 84 (17 Aug 2017 10:00) (74 - 84)  BP: 137/68 (17 Aug 2017 10:00) (105/64 - 155/76)  BP(mean): 93 (17 Aug 2017 10:00) (78 - 113)  ABP: --  ABP(mean): --  RR: 26 (17 Aug 2017 10:00) (20 - 33)  SpO2: 95% (17 Aug 2017 10:00) (88% - 100%)      CAPILLARY BLOOD GLUCOSE  119 (16 Aug 2017 16:00)  128 (16 Aug 2017 12:00)        PHYSICAL EXAM:  NEURO: A&Ox3, NAD, hearing loss improving.  HEENT: PERRL, MMM  CV: RRR  PULM: CTAB  ABD: Softly distended, less TTP. LLQ malecot drain to gravity- minimal bilious output (expected as drain is within bowel lumen); LLQ CHECO drain- minimal dark/maroon output. LUQ CHECO drain- notable dark green/SS output w/ particulate matter. Midline abd wound vac in place w/ good seal; SS/slightly brown output in canister.  : Voids  EXTR: WWP. No edema.  VASC: 2+ radial, 1+ DP pulses  MSK: No joint swelling.   SKIN: No rash. Stage 2 sacral pressure ulcer present but does not appear infected.  Psych: Affect appropriate      LABS:                        7.9    9.8   )-----------( 336      ( 17 Aug 2017 05:07 )             24.7     137  |  102  |  19  ----------------------------<  163<H>  4.4   |  25  |  0.50    Ca    8.4      17 Aug 2017 05:07  Phos  3.8     08-17  Mg     2.2     08-17

## 2017-08-17 NOTE — PROGRESS NOTE ADULT - ASSESSMENT
57 F s/p  SBR resection, fistula resection, esophagojejunostomy revision w/ post-op course complicated by RTOR for distal anastomosis breakdown, ATN, acute respiratory failure, & septic shock.     Neuro: Dilaudid  PRN; ENT consult re: hearing loss   CV: MAP 65  Resp: Satting well on NC   GI/FEN: NPO, TPN/lipids, Vit B12, Protonix  : Voids  Endo: ISS calcitriol 2* vit d def.   ID: anastamotic leak: meropenem (8/11--) // D/c Perioperative Gent/Vanc, colistin (7/29-8/1), vanc (8/1-8/2), micafungin (7/29-8/7), zosyn (8/1-11), fluconazole (8/7-8/15), linezolid (7/25-8/1; 8/2-8/15)  Heme/PPX: SCDs, SQH  Lines: L subclavian TLC (8/10--), Radha (7/24-8/8), L basilic vein PICC (7/25-8/8), L IJ TLC (7/30-8/10) Axillary to subclavian DVT chronic since January. unable to start hep 2* to bleeding.   Drains: SUJEY KESSLER. LUQ CHECO, Malecot in enterotomy site, wound vac to midline  Wounds: Midline xiphoid to pubis incision; DTI & stage 2 pressure ulcer noted.

## 2017-08-18 LAB
-  AZTREONAM: SIGNIFICANT CHANGE UP
-  CEFTAZIDIME: SIGNIFICANT CHANGE UP
-  CIPROFLOXACIN: SIGNIFICANT CHANGE UP
-  GENTAMICIN: SIGNIFICANT CHANGE UP
-  IMIPENEM: SIGNIFICANT CHANGE UP
-  PIPERACILLIN/TAZOBACTAM: SIGNIFICANT CHANGE UP
-  TOBRAMYCIN: SIGNIFICANT CHANGE UP
ANION GAP SERPL CALC-SCNC: 11 MMOL/L — SIGNIFICANT CHANGE UP (ref 5–17)
BUN SERPL-MCNC: 18 MG/DL — SIGNIFICANT CHANGE UP (ref 7–23)
CALCIUM SERPL-MCNC: 8.7 MG/DL — SIGNIFICANT CHANGE UP (ref 8.4–10.5)
CHLORIDE SERPL-SCNC: 101 MMOL/L — SIGNIFICANT CHANGE UP (ref 96–108)
CO2 SERPL-SCNC: 23 MMOL/L — SIGNIFICANT CHANGE UP (ref 22–31)
CREAT SERPL-MCNC: 0.4 MG/DL — LOW (ref 0.5–1.3)
CULTURE RESULTS: SIGNIFICANT CHANGE UP
GLUCOSE SERPL-MCNC: 120 MG/DL — HIGH (ref 70–99)
HCT VFR BLD CALC: 25.8 % — LOW (ref 34.5–45)
HGB BLD-MCNC: 8.2 G/DL — LOW (ref 11.5–15.5)
MAGNESIUM SERPL-MCNC: 2.2 MG/DL — SIGNIFICANT CHANGE UP (ref 1.6–2.6)
MCHC RBC-ENTMCNC: 28.4 PG — SIGNIFICANT CHANGE UP (ref 27–34)
MCHC RBC-ENTMCNC: 31.8 G/DL — LOW (ref 32–36)
MCV RBC AUTO: 89.3 FL — SIGNIFICANT CHANGE UP (ref 80–100)
METHOD TYPE: SIGNIFICANT CHANGE UP
ORGANISM # SPEC MICROSCOPIC CNT: SIGNIFICANT CHANGE UP
ORGANISM # SPEC MICROSCOPIC CNT: SIGNIFICANT CHANGE UP
PHOSPHATE SERPL-MCNC: 3.1 MG/DL — SIGNIFICANT CHANGE UP (ref 2.5–4.5)
PLATELET # BLD AUTO: 354 K/UL — SIGNIFICANT CHANGE UP (ref 150–400)
POTASSIUM SERPL-MCNC: 4.4 MMOL/L — SIGNIFICANT CHANGE UP (ref 3.5–5.3)
POTASSIUM SERPL-SCNC: 4.4 MMOL/L — SIGNIFICANT CHANGE UP (ref 3.5–5.3)
RBC # BLD: 2.89 M/UL — LOW (ref 3.8–5.2)
RBC # FLD: 16.6 % — SIGNIFICANT CHANGE UP (ref 10.3–16.9)
SODIUM SERPL-SCNC: 135 MMOL/L — SIGNIFICANT CHANGE UP (ref 135–145)
SPECIMEN SOURCE: SIGNIFICANT CHANGE UP
WBC # BLD: 9.3 K/UL — SIGNIFICANT CHANGE UP (ref 3.8–10.5)
WBC # FLD AUTO: 9.3 K/UL — SIGNIFICANT CHANGE UP (ref 3.8–10.5)

## 2017-08-18 PROCEDURE — 97606 NEG PRS WND THER DME>50 SQCM: CPT

## 2017-08-18 PROCEDURE — 71010: CPT | Mod: 26

## 2017-08-18 PROCEDURE — 99233 SBSQ HOSP IP/OBS HIGH 50: CPT | Mod: GC

## 2017-08-18 PROCEDURE — 97608 NEG PRS WND THER NDME>50SQCM: CPT | Mod: 59

## 2017-08-18 RX ORDER — I.V. FAT EMULSION 20 G/100ML
50 EMULSION INTRAVENOUS
Qty: 0 | Refills: 0 | Status: DISCONTINUED | OUTPATIENT
Start: 2017-08-18 | End: 2017-08-18

## 2017-08-18 RX ORDER — DIPHENHYDRAMINE HCL 50 MG
25 CAPSULE ORAL ONCE
Qty: 0 | Refills: 0 | Status: COMPLETED | OUTPATIENT
Start: 2017-08-18 | End: 2017-08-19

## 2017-08-18 RX ORDER — ELECTROLYTE SOLUTION,INJ
1 VIAL (ML) INTRAVENOUS
Qty: 0 | Refills: 0 | Status: DISCONTINUED | OUTPATIENT
Start: 2017-08-18 | End: 2017-08-18

## 2017-08-18 RX ADMIN — HYDROMORPHONE HYDROCHLORIDE 1 MILLIGRAM(S): 2 INJECTION INTRAMUSCULAR; INTRAVENOUS; SUBCUTANEOUS at 05:30

## 2017-08-18 RX ADMIN — PANTOPRAZOLE SODIUM 40 MILLIGRAM(S): 20 TABLET, DELAYED RELEASE ORAL at 13:42

## 2017-08-18 RX ADMIN — HYDROMORPHONE HYDROCHLORIDE 1 MILLIGRAM(S): 2 INJECTION INTRAMUSCULAR; INTRAVENOUS; SUBCUTANEOUS at 22:10

## 2017-08-18 RX ADMIN — HYDROMORPHONE HYDROCHLORIDE 1 MILLIGRAM(S): 2 INJECTION INTRAMUSCULAR; INTRAVENOUS; SUBCUTANEOUS at 04:00

## 2017-08-18 RX ADMIN — CHLORHEXIDINE GLUCONATE 15 MILLILITER(S): 213 SOLUTION TOPICAL at 06:51

## 2017-08-18 RX ADMIN — HEPARIN SODIUM 5000 UNIT(S): 5000 INJECTION INTRAVENOUS; SUBCUTANEOUS at 06:51

## 2017-08-18 RX ADMIN — HEPARIN SODIUM 5000 UNIT(S): 5000 INJECTION INTRAVENOUS; SUBCUTANEOUS at 21:52

## 2017-08-18 RX ADMIN — MEROPENEM 100 MILLIGRAM(S): 1 INJECTION INTRAVENOUS at 21:52

## 2017-08-18 RX ADMIN — MEROPENEM 100 MILLIGRAM(S): 1 INJECTION INTRAVENOUS at 14:16

## 2017-08-18 RX ADMIN — Medication 25 MILLIGRAM(S): at 00:04

## 2017-08-18 RX ADMIN — CALCITRIOL 1 MICROGRAM(S): 0.5 CAPSULE ORAL at 13:43

## 2017-08-18 RX ADMIN — HYDROMORPHONE HYDROCHLORIDE 1 MILLIGRAM(S): 2 INJECTION INTRAMUSCULAR; INTRAVENOUS; SUBCUTANEOUS at 14:16

## 2017-08-18 RX ADMIN — MEROPENEM 100 MILLIGRAM(S): 1 INJECTION INTRAVENOUS at 06:52

## 2017-08-18 RX ADMIN — Medication 1 EACH: at 18:12

## 2017-08-18 RX ADMIN — I.V. FAT EMULSION 20.83 GRAM(S): 20 EMULSION INTRAVENOUS at 21:52

## 2017-08-18 RX ADMIN — HYDROMORPHONE HYDROCHLORIDE 1 MILLIGRAM(S): 2 INJECTION INTRAMUSCULAR; INTRAVENOUS; SUBCUTANEOUS at 17:51

## 2017-08-18 RX ADMIN — HYDROMORPHONE HYDROCHLORIDE 1 MILLIGRAM(S): 2 INJECTION INTRAMUSCULAR; INTRAVENOUS; SUBCUTANEOUS at 00:21

## 2017-08-18 RX ADMIN — NYSTATIN CREAM 1 APPLICATION(S): 100000 CREAM TOPICAL at 18:11

## 2017-08-18 RX ADMIN — HEPARIN SODIUM 5000 UNIT(S): 5000 INJECTION INTRAVENOUS; SUBCUTANEOUS at 14:16

## 2017-08-18 RX ADMIN — NYSTATIN CREAM 1 APPLICATION(S): 100000 CREAM TOPICAL at 06:53

## 2017-08-18 NOTE — PROGRESS NOTE ADULT - ATTENDING COMMENTS
Patient seen and examined with house-staff during bedside rounds.  Resident note read, including vitals, physical findings, laboratory data, and radiological reports.   Revisions included below.  Direct personal management at bed side and extensive interpretation of the data.  Plan was outlined and discussed in details with the housestaff.  Decision making of high complexity  The patient is improving clinically. Is no clinical evidence of Pneumonia.  Ultrasound of the left chest revealed small pleural effusion with no indication for thoracentesis at this point. Patient is on TPN. Patient had change with drainage of fluid collection.

## 2017-08-18 NOTE — PROGRESS NOTE ADULT - SUBJECTIVE AND OBJECTIVE BOX
SICU DAILY PROGRESS NOTE      INTERVAL HPI / OVERNIGHT EVENTS:       MEDICATIONS:  ---NEURO-  ondansetron Injectable 4 milliGRAM(s) IV Push every 6 hours PRN  HYDROmorphone  Injectable 0.5 milliGRAM(s) IV Push every 4 hours PRN  HYDROmorphone  Injectable 1 milliGRAM(s) IV Push every 4 hours PRN  ---CV-  ---PULM-  ---GI/FEN-  pantoprazole  Injectable 40 milliGRAM(s) IV Push daily  dextrose 5%. 1000 milliLiter(s) IV Continuous <Continuous>  cyanocobalamin Injectable 1000 MICROGram(s) SubCutaneous every 7 days  calcitriol Injectable 1 MICROGram(s) IV Push <User Schedule>  fat emulsion 20% Infusion 50 Gram(s) IV Continuous <Continuous>  Parenteral Nutrition - Adult 1 Each TPN Continuous <Continuous>  Parenteral Nutrition - Adult 1 Each TPN Continuous <Continuous>  fat emulsion 20% Infusion 50 Gram(s) IV Continuous <Continuous>  ----  ---ID-   meropenem IVPB 1000 milliGRAM(s) IV Intermittent every 8 hours  ---ENDO-  insulin lispro (HumaLOG) corrective regimen sliding scale   SubCutaneous every 6 hours  dextrose 50% Injectable 25 Gram(s) IV Push once  dextrose 50% Injectable 12.5 Gram(s) IV Push once  ---HEME-  heparin  Injectable 5000 Unit(s) SubCutaneous every 8 hours  ---OTHER-  sodium chloride 0.9% lock flush 10 milliLiter(s) IV Push every 1 hour PRN  sodium chloride 0.9% lock flush 10 milliLiter(s) IV Push every 12 hours PRN  sodium chloride 0.9% lock flush 20 milliLiter(s) IV Push once  nystatin Powder 1 Application(s) Topical two times a day  chlorhexidine 0.12% Liquid 15 milliLiter(s) Swish and Spit two times a day      CENTRAL LINE -- LOCATION, DATE INSERTED:      Remove: NO     Indication: Venous access in critically ill patient.    ARTERIAL LINE:      Remove: NO     Indication: Hemodynamic monitoring in critically ill patient.     URINARY CATHETER:      Remove: NO     Indication: I/O monitoring in critically ill patient.     VOLUME STATUS:   I&O's Detail    17 Aug 2017 07:01  -  18 Aug 2017 07:00  --------------------------------------------------------  IN:    Fat Emulsion 20%: 251.8 mL    TPN (Total Parenteral Nutrition): 1395.8 mL  Total IN: 1647.6 mL    OUT:    Bulb: 32 mL    Bulb: 240 mL    Drain: 110 mL    VAC (Vacuum Assisted Closure) System: 90 mL    Voided: 200 mL  Total OUT: 672 mL    Total NET: 975.6 mL      18 Aug 2017 07:01  -  18 Aug 2017 10:24  --------------------------------------------------------  IN:    Fat Emulsion 20%: 21 mL    TPN (Total Parenteral Nutrition): 63.4 mL  Total IN: 84.4 mL    OUT:  Total OUT: 0 mL    Total NET: 84.4 mL      VITALS:  ICU Vital Signs Last 24 Hrs  T(C): 38.1 (18 Aug 2017 06:45), Max: 38.1 (17 Aug 2017 17:01)  T(F): 100.6 (18 Aug 2017 06:45), Max: 100.6 (18 Aug 2017 06:45)  HR: 80 (18 Aug 2017 10:00) (66 - 86)  BP: 136/74 (18 Aug 2017 10:00) (100/83 - 148/88)  BP(mean): 104 (18 Aug 2017 10:00) (69 - 115)  ABP: --  ABP(mean): --  RR: 39 (18 Aug 2017 10:00) (19 - 46)  SpO2: 93% (18 Aug 2017 10:00) (89% - 99%)      CAPILLARY BLOOD GLUCOSE  117 (18 Aug 2017 06:00)  112 (17 Aug 2017 22:00)  116 (17 Aug 2017 16:10)  111 (17 Aug 2017 12:00)        PHYSICAL EXAM:    LABS:                        8.2    9.3   )-----------( 354      ( 18 Aug 2017 06:21 )             25.8     08-18    135  |  101  |  18  ----------------------------<  120<H>  4.4   |  23  |  0.40<L>    Ca    8.7      18 Aug 2017 06:21  Phos  3.1     08-18  Mg     2.2     08-18            CULTURES: SICU DAILY PROGRESS NOTE      INTERVAL HPI / OVERNIGHT EVENTS:       MEDICATIONS:  ---NEURO-  ondansetron Injectable 4 milliGRAM(s) IV Push every 6 hours PRN  HYDROmorphone  Injectable 0.5 milliGRAM(s) IV Push every 4 hours PRN  HYDROmorphone  Injectable 1 milliGRAM(s) IV Push every 4 hours PRN  ---GI/FEN-  pantoprazole  Injectable 40 milliGRAM(s) IV Push daily  dextrose 5%. 1000 milliLiter(s) IV Continuous <Continuous>  cyanocobalamin Injectable 1000 MICROGram(s) SubCutaneous every 7 days  calcitriol Injectable 1 MICROGram(s) IV Push <User Schedule>  fat emulsion 20% Infusion 50 Gram(s) IV Continuous <Continuous>  Parenteral Nutrition - Adult 1 Each TPN Continuous <Continuous>  Parenteral Nutrition - Adult 1 Each TPN Continuous <Continuous>  fat emulsion 20% Infusion 50 Gram(s) IV Continuous <Continuous>  ---ID-   meropenem IVPB 1000 milliGRAM(s) IV Intermittent every 8 hours  ---ENDO-  insulin lispro (HumaLOG) corrective regimen sliding scale   SubCutaneous every 6 hours  dextrose 50% Injectable 25 Gram(s) IV Push once  dextrose 50% Injectable 12.5 Gram(s) IV Push once  ---HEME-  heparin  Injectable 5000 Unit(s) SubCutaneous every 8 hours  ---OTHER-  sodium chloride 0.9% lock flush 10 milliLiter(s) IV Push every 1 hour PRN  sodium chloride 0.9% lock flush 10 milliLiter(s) IV Push every 12 hours PRN  sodium chloride 0.9% lock flush 20 milliLiter(s) IV Push once  nystatin Powder 1 Application(s) Topical two times a day  chlorhexidine 0.12% Liquid 15 milliLiter(s) Swish and Spit two times a day      CENTRAL LINE: YES - L subclavian (8/10-)             Remove: NO           Indication: TPN & venous access     LI: NO    A-LINE: NO      VOLUME STATUS:   I&O's Detail    17 Aug 2017 07:01  -  18 Aug 2017 07:00  --------------------------------------------------------  IN:    Fat Emulsion 20%: 251.8 mL    TPN (Total Parenteral Nutrition): 1395.8 mL  Total IN: 1647.6 mL    OUT:    Bulb: 32 mL    Bulb: 240 mL    Drain: 110 mL    VAC (Vacuum Assisted Closure) System: 90 mL    Voided: 200 mL  Total OUT: 672 mL    Total NET: 975.6 mL      18 Aug 2017 07:01  -  18 Aug 2017 10:24  --------------------------------------------------------  IN:    Fat Emulsion 20%: 21 mL    TPN (Total Parenteral Nutrition): 63.4 mL  Total IN: 84.4 mL    OUT:  Total OUT: 0 mL    Total NET: 84.4 mL      VITALS:  ICU Vital Signs Last 24 Hrs  T(C): 38.1 (18 Aug 2017 06:45), Max: 38.1 (17 Aug 2017 17:01)  T(F): 100.6 (18 Aug 2017 06:45), Max: 100.6 (18 Aug 2017 06:45)  HR: 80 (18 Aug 2017 10:00) (66 - 86)  BP: 136/74 (18 Aug 2017 10:00) (100/83 - 148/88)  BP(mean): 104 (18 Aug 2017 10:00) (69 - 115)  ABP: --  ABP(mean): --  RR: 39 (18 Aug 2017 10:00) (19 - 46)  SpO2: 93% (18 Aug 2017 10:00) (89% - 99%)      CAPILLARY BLOOD GLUCOSE  117 (18 Aug 2017 06:00)  112 (17 Aug 2017 22:00)  116 (17 Aug 2017 16:10)  111 (17 Aug 2017 12:00)      PHYSICAL EXAM:  NEURO: A&Ox3, NAD, hearing loss improving.  HEENT: PERRL, MMM  CV: RRR  PULM: CTAB  ABD: Softly distended, less TTP. LLQ malecot drain to gravity- minimal bilious output (expected as drain is within bowel lumen); LLQ CHECO drain- minimal dark/maroon output. LUQ CHECO drain- notable dark green/SS output w/ particulate matter. Midline abd wound vac in place w/ good seal; SS/slightly brown output in canister.  : Voids  EXTR: WWP. No edema.  VASC: 2+ radial, 1+ DP pulses  MSK: No joint swelling.   SKIN: No rash. Stage 2 sacral pressure ulcer present but does not appear infected.  Psych: Affect appropriate      LABS:                        8.2    9.3   )-----------( 354      ( 18 Aug 2017 06:21 )             25.8     135  |  101  |  18  ----------------------------<  120<H>  4.4   |  23  |  0.40<L>    Ca    8.7      18 Aug 2017 06:21  Phos  3.1     08-18  Mg     2.2     08-18 SICU DAILY PROGRESS NOTE      INTERVAL HPI / OVERNIGHT EVENTS:   8/18: Bedside lung U/S showed small L pleural effusion, not large enough to merit drainage.   O/N: afebrile, no issues  8/17: LUQ CHECO draining succus w/ particulate matter 280cc/24h, vs. malecot drain in bowel lumen draining 70cc/24h. Suspect chemical peritonitis as source of persistent fevers. bronch cx 8/17 growing carbapenem resistant pseudomonas sensitive to gent & tobra; also grew in sputum cx 8/11 while intubated      MEDICATIONS:  ---NEURO-  ondansetron Injectable 4 milliGRAM(s) IV Push every 6 hours PRN  HYDROmorphone  Injectable 0.5 milliGRAM(s) IV Push every 4 hours PRN  HYDROmorphone  Injectable 1 milliGRAM(s) IV Push every 4 hours PRN  ---GI/FEN-  pantoprazole  Injectable 40 milliGRAM(s) IV Push daily  dextrose 5%. 1000 milliLiter(s) IV Continuous <Continuous>  cyanocobalamin Injectable 1000 MICROGram(s) SubCutaneous every 7 days  calcitriol Injectable 1 MICROGram(s) IV Push <User Schedule>  fat emulsion 20% Infusion 50 Gram(s) IV Continuous <Continuous>  Parenteral Nutrition - Adult 1 Each TPN Continuous <Continuous>  Parenteral Nutrition - Adult 1 Each TPN Continuous <Continuous>  fat emulsion 20% Infusion 50 Gram(s) IV Continuous <Continuous>  ---ID-   meropenem IVPB 1000 milliGRAM(s) IV Intermittent every 8 hours  ---ENDO-  insulin lispro (HumaLOG) corrective regimen sliding scale   SubCutaneous every 6 hours  dextrose 50% Injectable 25 Gram(s) IV Push once  dextrose 50% Injectable 12.5 Gram(s) IV Push once  ---HEME-  heparin  Injectable 5000 Unit(s) SubCutaneous every 8 hours  ---OTHER-  sodium chloride 0.9% lock flush 10 milliLiter(s) IV Push every 1 hour PRN  sodium chloride 0.9% lock flush 10 milliLiter(s) IV Push every 12 hours PRN  sodium chloride 0.9% lock flush 20 milliLiter(s) IV Push once  nystatin Powder 1 Application(s) Topical two times a day  chlorhexidine 0.12% Liquid 15 milliLiter(s) Swish and Spit two times a day      CENTRAL LINE: YES - L subclavian (8/10-)             Remove: NO           Indication: TPN & venous access     LI: NO    A-LINE: NO      VOLUME STATUS:   I&O's Detail    17 Aug 2017 07:01  -  18 Aug 2017 07:00  --------------------------------------------------------  IN:    Fat Emulsion 20%: 251.8 mL    TPN (Total Parenteral Nutrition): 1395.8 mL  Total IN: 1647.6 mL    OUT:    Bulb: 32 mL    Bulb: 240 mL    Drain: 110 mL    VAC (Vacuum Assisted Closure) System: 90 mL    Voided: 200 mL  Total OUT: 672 mL    Total NET: 975.6 mL      18 Aug 2017 07:01  -  18 Aug 2017 10:24  --------------------------------------------------------  IN:    Fat Emulsion 20%: 21 mL    TPN (Total Parenteral Nutrition): 63.4 mL  Total IN: 84.4 mL    OUT:  Total OUT: 0 mL    Total NET: 84.4 mL      VITALS:  ICU Vital Signs Last 24 Hrs  T(C): 38.1 (18 Aug 2017 06:45), Max: 38.1 (17 Aug 2017 17:01)  T(F): 100.6 (18 Aug 2017 06:45), Max: 100.6 (18 Aug 2017 06:45)  HR: 80 (18 Aug 2017 10:00) (66 - 86)  BP: 136/74 (18 Aug 2017 10:00) (100/83 - 148/88)  BP(mean): 104 (18 Aug 2017 10:00) (69 - 115)  ABP: --  ABP(mean): --  RR: 39 (18 Aug 2017 10:00) (19 - 46)  SpO2: 93% (18 Aug 2017 10:00) (89% - 99%)      CAPILLARY BLOOD GLUCOSE  117 (18 Aug 2017 06:00)  112 (17 Aug 2017 22:00)  116 (17 Aug 2017 16:10)  111 (17 Aug 2017 12:00)      PHYSICAL EXAM:  NEURO: A&Ox3, NAD, hearing loss improving.  HEENT: PERRL, MMM  CV: RRR  PULM: CTAB  ABD: Softly distended, less TTP. LLQ malecot drain to gravity- minimal bilious output (expected as drain is within bowel lumen); LLQ CHECO drain- minimal dark/maroon output. LUQ CHECO drain- notable dark green/SS output w/ particulate matter. Midline abd wound vac in place w/ good seal; SS/slightly brown output in canister.  : Voids  EXTR: WWP. No edema.  VASC: 2+ radial, 1+ DP pulses  MSK: No joint swelling.   SKIN: No rash. Stage 2 sacral pressure ulcer present but does not appear infected.  Psych: Affect appropriate      LABS:                        8.2    9.3   )-----------( 354      ( 18 Aug 2017 06:21 )             25.8     135  |  101  |  18  ----------------------------<  120<H>  4.4   |  23  |  0.40<L>    Ca    8.7      18 Aug 2017 06:21  Phos  3.1     08-18  Mg     2.2     08-18

## 2017-08-18 NOTE — PROGRESS NOTE ADULT - SUBJECTIVE AND OBJECTIVE BOX
continues to progress medically  mallicot with 50 cc of bile  still with thick stuff out of CHECO in gutter  will change vac and make smaller to encourage granulation and in growth  continue PT  will await drainage to clear and then consider tube study  when drainage clears  otherwise stable

## 2017-08-19 LAB
ANION GAP SERPL CALC-SCNC: 13 MMOL/L — SIGNIFICANT CHANGE UP (ref 5–17)
BUN SERPL-MCNC: 19 MG/DL — SIGNIFICANT CHANGE UP (ref 7–23)
CALCIUM SERPL-MCNC: 8.8 MG/DL — SIGNIFICANT CHANGE UP (ref 8.4–10.5)
CHLORIDE SERPL-SCNC: 99 MMOL/L — SIGNIFICANT CHANGE UP (ref 96–108)
CO2 SERPL-SCNC: 23 MMOL/L — SIGNIFICANT CHANGE UP (ref 22–31)
CREAT SERPL-MCNC: 0.5 MG/DL — SIGNIFICANT CHANGE UP (ref 0.5–1.3)
GLUCOSE SERPL-MCNC: 99 MG/DL — SIGNIFICANT CHANGE UP (ref 70–99)
HCT VFR BLD CALC: 27.5 % — LOW (ref 34.5–45)
HGB BLD-MCNC: 8.8 G/DL — LOW (ref 11.5–15.5)
MAGNESIUM SERPL-MCNC: 2.1 MG/DL — SIGNIFICANT CHANGE UP (ref 1.6–2.6)
MCHC RBC-ENTMCNC: 28.7 PG — SIGNIFICANT CHANGE UP (ref 27–34)
MCHC RBC-ENTMCNC: 32 G/DL — SIGNIFICANT CHANGE UP (ref 32–36)
MCV RBC AUTO: 89.6 FL — SIGNIFICANT CHANGE UP (ref 80–100)
PHOSPHATE SERPL-MCNC: 3.1 MG/DL — SIGNIFICANT CHANGE UP (ref 2.5–4.5)
PLATELET # BLD AUTO: 350 K/UL — SIGNIFICANT CHANGE UP (ref 150–400)
POTASSIUM SERPL-MCNC: 4.3 MMOL/L — SIGNIFICANT CHANGE UP (ref 3.5–5.3)
POTASSIUM SERPL-SCNC: 4.3 MMOL/L — SIGNIFICANT CHANGE UP (ref 3.5–5.3)
RBC # BLD: 3.07 M/UL — LOW (ref 3.8–5.2)
RBC # FLD: 17.2 % — HIGH (ref 10.3–16.9)
SODIUM SERPL-SCNC: 135 MMOL/L — SIGNIFICANT CHANGE UP (ref 135–145)
WBC # BLD: 10.2 K/UL — SIGNIFICANT CHANGE UP (ref 3.8–10.5)
WBC # FLD AUTO: 10.2 K/UL — SIGNIFICANT CHANGE UP (ref 3.8–10.5)

## 2017-08-19 PROCEDURE — 99232 SBSQ HOSP IP/OBS MODERATE 35: CPT

## 2017-08-19 PROCEDURE — 71010: CPT | Mod: 26

## 2017-08-19 RX ORDER — I.V. FAT EMULSION 20 G/100ML
50 EMULSION INTRAVENOUS
Qty: 0 | Refills: 0 | Status: DISCONTINUED | OUTPATIENT
Start: 2017-08-19 | End: 2017-08-19

## 2017-08-19 RX ORDER — DIPHENHYDRAMINE HCL 50 MG
50 CAPSULE ORAL ONCE
Qty: 0 | Refills: 0 | Status: COMPLETED | OUTPATIENT
Start: 2017-08-19 | End: 2017-08-19

## 2017-08-19 RX ORDER — ELECTROLYTE SOLUTION,INJ
1 VIAL (ML) INTRAVENOUS
Qty: 0 | Refills: 0 | Status: DISCONTINUED | OUTPATIENT
Start: 2017-08-19 | End: 2017-08-19

## 2017-08-19 RX ADMIN — MEROPENEM 100 MILLIGRAM(S): 1 INJECTION INTRAVENOUS at 13:26

## 2017-08-19 RX ADMIN — HYDROMORPHONE HYDROCHLORIDE 1 MILLIGRAM(S): 2 INJECTION INTRAMUSCULAR; INTRAVENOUS; SUBCUTANEOUS at 11:03

## 2017-08-19 RX ADMIN — HEPARIN SODIUM 5000 UNIT(S): 5000 INJECTION INTRAVENOUS; SUBCUTANEOUS at 21:57

## 2017-08-19 RX ADMIN — HYDROMORPHONE HYDROCHLORIDE 1 MILLIGRAM(S): 2 INJECTION INTRAMUSCULAR; INTRAVENOUS; SUBCUTANEOUS at 20:07

## 2017-08-19 RX ADMIN — Medication 50 MILLIGRAM(S): at 22:36

## 2017-08-19 RX ADMIN — HEPARIN SODIUM 5000 UNIT(S): 5000 INJECTION INTRAVENOUS; SUBCUTANEOUS at 13:26

## 2017-08-19 RX ADMIN — MEROPENEM 100 MILLIGRAM(S): 1 INJECTION INTRAVENOUS at 22:02

## 2017-08-19 RX ADMIN — HYDROMORPHONE HYDROCHLORIDE 1 MILLIGRAM(S): 2 INJECTION INTRAMUSCULAR; INTRAVENOUS; SUBCUTANEOUS at 04:10

## 2017-08-19 RX ADMIN — I.V. FAT EMULSION 20.83 GRAM(S): 20 EMULSION INTRAVENOUS at 22:01

## 2017-08-19 RX ADMIN — MEROPENEM 100 MILLIGRAM(S): 1 INJECTION INTRAVENOUS at 07:09

## 2017-08-19 RX ADMIN — HYDROMORPHONE HYDROCHLORIDE 1 MILLIGRAM(S): 2 INJECTION INTRAMUSCULAR; INTRAVENOUS; SUBCUTANEOUS at 02:51

## 2017-08-19 RX ADMIN — HYDROMORPHONE HYDROCHLORIDE 1 MILLIGRAM(S): 2 INJECTION INTRAMUSCULAR; INTRAVENOUS; SUBCUTANEOUS at 21:00

## 2017-08-19 RX ADMIN — HYDROMORPHONE HYDROCHLORIDE 1 MILLIGRAM(S): 2 INJECTION INTRAMUSCULAR; INTRAVENOUS; SUBCUTANEOUS at 15:30

## 2017-08-19 RX ADMIN — Medication 1 EACH: at 17:38

## 2017-08-19 RX ADMIN — Medication 25 MILLIGRAM(S): at 00:13

## 2017-08-19 RX ADMIN — PANTOPRAZOLE SODIUM 40 MILLIGRAM(S): 20 TABLET, DELAYED RELEASE ORAL at 12:33

## 2017-08-19 RX ADMIN — HYDROMORPHONE HYDROCHLORIDE 1 MILLIGRAM(S): 2 INJECTION INTRAMUSCULAR; INTRAVENOUS; SUBCUTANEOUS at 15:14

## 2017-08-19 RX ADMIN — HYDROMORPHONE HYDROCHLORIDE 1 MILLIGRAM(S): 2 INJECTION INTRAMUSCULAR; INTRAVENOUS; SUBCUTANEOUS at 11:30

## 2017-08-19 RX ADMIN — HEPARIN SODIUM 5000 UNIT(S): 5000 INJECTION INTRAVENOUS; SUBCUTANEOUS at 07:08

## 2017-08-19 RX ADMIN — NYSTATIN CREAM 1 APPLICATION(S): 100000 CREAM TOPICAL at 17:39

## 2017-08-19 NOTE — PROGRESS NOTE ADULT - SUBJECTIVE AND OBJECTIVE BOX
S: No new issues/events overnight, no new med c/o    O: ICU Vital Signs Last 24 Hrs  T(F): 97.8 (08-19-17 @ 09:39), Max: 100 (08-18-17 @ 14:00)  HR: 72 (08-19-17 @ 10:00) (64 - 94)  BP: 128/79 (08-19-17 @ 10:00) (111/56 - 162/65)  BP(mean): 96 (08-19-17 @ 10:00) (70 - 202)  ABP: --  RR: 25 (08-19-17 @ 10:00) (17 - 39)  SpO2: 95% (08-19-17 @ 10:00) (65% - 97%)    PHYSICAL EXAM:     Neurological: AAOx3, CNII-XII intact,  strength 5/5 b/l  Cardiovascular: RRR  Respiratory: CTA  Gastrointestinal: soft, NT, midline vac dsg intact, left sided CHECO x 2 with cloudy/thick dark drainage. malecot with bilious drainage.   Extremities: warm, no dependent edema  Vascular: no cyanosis/erythema    LABS:    08-19    135  |  99  |  19  ----------------------------<  99  4.3   |  23  |  0.50    Ca    8.8      19 Aug 2017 06:55  Phos  3.1     08-19  Mg     2.1     08-19                          8.8    10.2  )-----------( 350      ( 19 Aug 2017 06:55 )             27.5     CAPILLARY BLOOD GLUCOSE  106 (19 Aug 2017 07:00)  107 (18 Aug 2017 22:00)        MEDICATIONS  (STANDING):  insulin lispro (HumaLOG) corrective regimen sliding scale   SubCutaneous every 6 hours  sodium chloride 0.9% lock flush 20 milliLiter(s) IV Push once  cyanocobalamin Injectable 1000 MICROGram(s) SubCutaneous every 7 days  dextrose 50% Injectable 12.5 Gram(s) IV Push once  nystatin Powder 1 Application(s) Topical two times a day  pantoprazole  Injectable 40 milliGRAM(s) IV Push daily  calcitriol Injectable 1 MICROGram(s) IV Push <User Schedule>  meropenem IVPB 1000 milliGRAM(s) IV Intermittent every 8 hours  heparin  Injectable 5000 Unit(s) SubCutaneous every 8 hours  Parenteral Nutrition - Adult 1 Each (63 mL/Hr) TPN Continuous <Continuous>  Parenteral Nutrition - Adult 1 Each (64 mL/Hr) TPN Continuous <Continuous>  fat emulsion 20% Infusion 50 Gram(s) (20.8 mL/Hr) IV Continuous <Continuous>    MEDICATIONS  (PRN):  ondansetron Injectable 4 milliGRAM(s) IV Push every 6 hours PRN Nausea  sodium chloride 0.9% lock flush 10 milliLiter(s) IV Push every 1 hour PRN After each medication administration  sodium chloride 0.9% lock flush 10 milliLiter(s) IV Push every 12 hours PRN Lumen of catheter NOT used  HYDROmorphone  Injectable 0.5 milliGRAM(s) IV Push every 4 hours PRN Moderate Pain (4 - 6)  HYDROmorphone  Injectable 1 milliGRAM(s) IV Push every 4 hours PRN Severe Pain (7 - 10)      Yu:	  [x ] None	[ ] Daily Yu Order Placed	   Indication:	  [ ] Strict I and O's    [ ] Obstruction     [ ] Incontinence + Stage 3 or 4 Decubitus  Central Line:  [ ] None	   [ x]  Medication / TPN Administration     [ ] No Peripheral IV

## 2017-08-19 NOTE — PROGRESS NOTE ADULT - SUBJECTIVE AND OBJECTIVE BOX
INTERVAL HPI/OVERNIGHT EVENTS:    Patient was seen and examined.  Has some abdominal pain.  Low grade fevers over past 24 hours. On IV meropenem    CONSTITUTIONAL:  Negative fever or chills, feels well, good appetite  EYES:  Negative  blurry vision or double vision  CARDIOVASCULAR:  Negative for chest pain or palpitations  RESPIRATORY:  Negative for cough, wheezing, or SOB   GASTROINTESTINAL:  Negative for nausea, vomiting, diarrhea, constipation, + mild abdominal pain  GENITOURINARY:  Negative frequency, urgency or dysuria  NEUROLOGIC:  No headache, confusion, dizziness, lightheadedness      ANTIBIOTICS/RELEVANT:    MEDICATIONS  (STANDING):  insulin lispro (HumaLOG) corrective regimen sliding scale   SubCutaneous every 6 hours  dextrose 5%. 1000 milliLiter(s) (50 mL/Hr) IV Continuous <Continuous>  dextrose 50% Injectable 25 Gram(s) IV Push once  sodium chloride 0.9% lock flush 20 milliLiter(s) IV Push once  cyanocobalamin Injectable 1000 MICROGram(s) SubCutaneous every 7 days  dextrose 50% Injectable 12.5 Gram(s) IV Push once  nystatin Powder 1 Application(s) Topical two times a day  pantoprazole  Injectable 40 milliGRAM(s) IV Push daily  calcitriol Injectable 1 MICROGram(s) IV Push <User Schedule>  meropenem IVPB 1000 milliGRAM(s) IV Intermittent every 8 hours  heparin  Injectable 5000 Unit(s) SubCutaneous every 8 hours  Parenteral Nutrition - Adult 1 Each (63 mL/Hr) TPN Continuous <Continuous>    MEDICATIONS  (PRN):  ondansetron Injectable 4 milliGRAM(s) IV Push every 6 hours PRN Nausea  sodium chloride 0.9% lock flush 10 milliLiter(s) IV Push every 1 hour PRN After each medication administration  sodium chloride 0.9% lock flush 10 milliLiter(s) IV Push every 12 hours PRN Lumen of catheter NOT used  HYDROmorphone  Injectable 0.5 milliGRAM(s) IV Push every 4 hours PRN Moderate Pain (4 - 6)  HYDROmorphone  Injectable 1 milliGRAM(s) IV Push every 4 hours PRN Severe Pain (7 - 10)        Vital Signs Last 24 Hrs  T(C): 36.6 (19 Aug 2017 09:39), Max: 37.9 (18 Aug 2017 10:00)  T(F): 97.8 (19 Aug 2017 09:39), Max: 100.2 (18 Aug 2017 10:00)  HR: 74 (19 Aug 2017 09:00) (64 - 94)  BP: 119/66 (19 Aug 2017 09:00) (111/56 - 162/65)  BP(mean): 81 (19 Aug 2017 09:00) (70 - 202)  RR: 22 (19 Aug 2017 09:00) (17 - 39)  SpO2: 94% (19 Aug 2017 09:00) (65% - 97%)    PHYSICAL EXAM:  Constitutional: non-toxic, no distress  Eyes: no icterus  Ear/Nose/Throat: no oral lesion, no sinus tenderness on percussion	  Neck:no JVD, no lymphadenopathy, supple  Respiratory: CTA eileen  Cardiovascular: S1S2 RRR, no murmurs  Gastrointestinal:soft, multiple drains in place; large wound vac  Extremities:mild edema  Vascular: DP Pulse:	right normal; left normal      LABS:                        8.8    10.2  )-----------( 350      ( 19 Aug 2017 06:55 )             27.5     08-19    135  |  99  |  19  ----------------------------<  99  4.3   |  23  |  0.50    Ca    8.8      19 Aug 2017 06:55  Phos  3.1     08-19  Mg     2.1     08-19            MICROBIOLOGY:  Culture - Acid Fast - Bronchial w/Smear (08.17.17 @ 03:25)    Specimen Source: .Broncial None    Acid Fast Bacilli Smear:   No acid fast bacilli seen by fluorochrome stain    Culture - Bronchial (08.16.17 @ 11:50)    -  Aztreonam: R >16    Gram Stain:   Few WBC's  Rare epithelial cells  Few Gram Negative Rods  Rare Gram positive cocci in pairs    -  Tobramycin: S <=4    -  Imipenem: R >8    -  Piperacillin/Tazobactam: R >64    -  Ceftazidime: R >16    -  Ciprofloxacin: R >2    -  Gentamicin: S <=4    Specimen Source: .Broncial None    Culture Results:   Few-moderate Pseudomonas aeruginosa (Carbapenem Resistant)  Result called to and read back byDilcia Marcus RN  08/18/2017 12:47:09  Rare Routine respiratory roland present    Organism Identification: Pseudomonas aeruginosa (Carbapenem Resistant)    Organism: Pseudomonas aeruginosa (Carbapenem Resistant)    Method Type: ALISHA    Method Type: ALISHA (08.16.17 @ 11:50)    Culture - Sputum . (08.11.17 @ 11:26)    -  Aztreonam: R >16    -  Ciprofloxacin: I 2    -  Piperacillin/Tazobactam: R >64    -  Tobramycin: S <=4    -  Ceftazidime: R >16    -  Gentamicin: S <=4    -  Imipenem: R >8    Gram Stain:   Rare epithelial cells  Rare White blood cells  Rare Gram Negative Rods    Specimen Source: .Sputum Sputum    Culture Results:   Rare Pseudomonas aeruginosa (Carbapenem Resistant)  Result called to and read back byDilcia Stephenson RN  08/17/2017 13:55:26  Normal Respiratory Roland absent    Organism Identification: Pseudomonas aeruginosa (Carbapenem Resistant)    Organism: Pseudomonas aeruginosa (Carbapenem Resistant)    Method Type: ALISHA          RADIOLOGY & ADDITIONAL STUDIES:  Xray:    < from: Xray Chest 1 View AP -PORTABLE-Routine (08.19.17 @ 01:58) >  ortable examination the chest demonstrates no interval change lung   pathology in comparison to prior examination of the chest 8/18/2017. No   interval change position support devices.    Impression: No interval change lung pathology    < end of copied text >

## 2017-08-19 NOTE — PROGRESS NOTE ADULT - SUBJECTIVE AND OBJECTIVE BOX
Patient is a 57y old  Female who presents with a chief complaint of enterocutaneous fistula (24 Jul 2017 14:15)        INTERVAL HPI/OVERNIGHT EVENTS:    SYMPTOMS none    DRIPS none        ICU Vital Signs Last 24 Hrs  T(C): 36.6 (19 Aug 2017 09:39), Max: 37.8 (18 Aug 2017 14:00)  T(F): 97.8 (19 Aug 2017 09:39), Max: 100 (18 Aug 2017 14:00)  HR: 72 (19 Aug 2017 10:00) (64 - 94)  BP: 128/79 (19 Aug 2017 10:00) (111/56 - 162/65)  BP(mean): 96 (19 Aug 2017 10:00) (70 - 202)  ABP: --  ABP(mean): --  RR: 25 (19 Aug 2017 10:00) (17 - 39)  SpO2: 95% (19 Aug 2017 10:00) (65% - 97%)      I&O's Summary    18 Aug 2017 07:01  -  19 Aug 2017 07:00  --------------------------------------------------------  IN: 1923.2 mL / OUT: 1029 mL / NET: 894.2 mL    19 Aug 2017 07:01  -  19 Aug 2017 11:37  --------------------------------------------------------  IN: 253.2 mL / OUT: 100 mL / NET: 153.2 mL        EXAM    Chest clear    Heart rr    Abdomen soft nontender with bs    Extremities warm, trac edma    Neuro awake alert      LABS:  CAPILLARY BLOOD GLUCOSE  106 (19 Aug 2017 07:00)  107 (18 Aug 2017 22:00)                            8.8    10.2  )-----------( 350      ( 19 Aug 2017 06:55 )             27.5     08-19    135  |  99  |  19  ----------------------------<  99  4.3   |  23  |  0.50    Ca    8.8      19 Aug 2017 06:55  Phos  3.1     08-19  Mg     2.1     08-19                RADIOLOGY & ADDITIONAL STUDIES:    A - sp abdominal surgery for fistula/ intra-abdom sepsis/obesity    Suggest:  even fluid balance  mobilize  ABX per ID  continue TPN          CRITICAL CARE TIME SPENT:

## 2017-08-19 NOTE — PROGRESS NOTE ADULT - ASSESSMENT
IMP:  Intraabdominal infection, clinically improved.  No SOB or hypoxia to suggest pneumonia.  The pseudomonas on sputum culture is likely colonization    Recommend:  1.  Continue IV meropenem.  Can likely stop in the next 2-3 days after a 10 day course if patient stable.    2.  Would not treat the pseudomonas in sputum at this time

## 2017-08-19 NOTE — PROGRESS NOTE ADULT - ASSESSMENT
57 F s/p  SBR resection, fistula resection, esophagojejunostomy revision w/ post-op course complicated by RTOR for distal anastomosis breakdown, ATN, acute respiratory failure, & septic shock.     Neuro: Dilaudid  PRN; ENT consult re: hearing loss   CV: hemodynamically stable at this time.   Resp: Satting well on NC   GI/FEN: NPO, TPN/lipids, Vit B12, Protonix  : Voids  Endo: ISS calcitriol 2* vit d def.   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 unable to start hep 2* to bleeding.   Drains: SUJEY KESSLER. LUQ CHECO, Malecot in enterotomy site, wound vac to midline  Wounds: Midline xiphoid to pubis incision; DTI & stage 2 pressure ulcer noted.    PT consulted. OOB to chair.

## 2017-08-20 LAB
AMYLASE FLD-CCNC: >7500 U/L — SIGNIFICANT CHANGE UP
ANION GAP SERPL CALC-SCNC: 11 MMOL/L — SIGNIFICANT CHANGE UP (ref 5–17)
ANION GAP SERPL CALC-SCNC: 11 MMOL/L — SIGNIFICANT CHANGE UP (ref 5–17)
BUN SERPL-MCNC: 13 MG/DL — SIGNIFICANT CHANGE UP (ref 7–23)
BUN SERPL-MCNC: 19 MG/DL — SIGNIFICANT CHANGE UP (ref 7–23)
CALCIUM SERPL-MCNC: 4.9 MG/DL — CRITICAL LOW (ref 8.4–10.5)
CALCIUM SERPL-MCNC: 8.8 MG/DL — SIGNIFICANT CHANGE UP (ref 8.4–10.5)
CHLORIDE SERPL-SCNC: 103 MMOL/L — SIGNIFICANT CHANGE UP (ref 96–108)
CHLORIDE SERPL-SCNC: 117 MMOL/L — HIGH (ref 96–108)
CO2 SERPL-SCNC: 12 MMOL/L — LOW (ref 22–31)
CO2 SERPL-SCNC: 23 MMOL/L — SIGNIFICANT CHANGE UP (ref 22–31)
CREAT SERPL-MCNC: 0.3 MG/DL — LOW (ref 0.5–1.3)
CREAT SERPL-MCNC: 0.4 MG/DL — LOW (ref 0.5–1.3)
GLUCOSE SERPL-MCNC: 120 MG/DL — HIGH (ref 70–99)
GLUCOSE SERPL-MCNC: 79 MG/DL — SIGNIFICANT CHANGE UP (ref 70–99)
HCT VFR BLD CALC: 15.3 % — CRITICAL LOW (ref 34.5–45)
HCT VFR BLD CALC: 29.1 % — LOW (ref 34.5–45)
HGB BLD-MCNC: 4.9 G/DL — CRITICAL LOW (ref 11.5–15.5)
HGB BLD-MCNC: 9.2 G/DL — LOW (ref 11.5–15.5)
LIPASE FLD-CCNC: >600 U/L — SIGNIFICANT CHANGE UP
MAGNESIUM SERPL-MCNC: 1.2 MG/DL — LOW (ref 1.6–2.6)
MAGNESIUM SERPL-MCNC: 2.2 MG/DL — SIGNIFICANT CHANGE UP (ref 1.6–2.6)
MCHC RBC-ENTMCNC: 28.8 PG — SIGNIFICANT CHANGE UP (ref 27–34)
MCHC RBC-ENTMCNC: 29 PG — SIGNIFICANT CHANGE UP (ref 27–34)
MCHC RBC-ENTMCNC: 31.6 G/DL — LOW (ref 32–36)
MCHC RBC-ENTMCNC: 32 G/DL — SIGNIFICANT CHANGE UP (ref 32–36)
MCV RBC AUTO: 90 FL — SIGNIFICANT CHANGE UP (ref 80–100)
MCV RBC AUTO: 91.8 FL — SIGNIFICANT CHANGE UP (ref 80–100)
PHOSPHATE SERPL-MCNC: 1.9 MG/DL — LOW (ref 2.5–4.5)
PHOSPHATE SERPL-MCNC: 3.4 MG/DL — SIGNIFICANT CHANGE UP (ref 2.5–4.5)
PLATELET # BLD AUTO: 198 K/UL — SIGNIFICANT CHANGE UP (ref 150–400)
PLATELET # BLD AUTO: 341 K/UL — SIGNIFICANT CHANGE UP (ref 150–400)
POTASSIUM SERPL-MCNC: 2.4 MMOL/L — CRITICAL LOW (ref 3.5–5.3)
POTASSIUM SERPL-MCNC: 4.3 MMOL/L — SIGNIFICANT CHANGE UP (ref 3.5–5.3)
POTASSIUM SERPL-SCNC: 2.4 MMOL/L — CRITICAL LOW (ref 3.5–5.3)
POTASSIUM SERPL-SCNC: 4.3 MMOL/L — SIGNIFICANT CHANGE UP (ref 3.5–5.3)
RBC # BLD: 1.7 M/UL — LOW (ref 3.8–5.2)
RBC # BLD: 3.17 M/UL — LOW (ref 3.8–5.2)
RBC # FLD: 17.3 % — HIGH (ref 10.3–16.9)
RBC # FLD: 17.3 % — HIGH (ref 10.3–16.9)
RESPIRATORY PATH PNL SPEC CULT: (no result)
SODIUM SERPL-SCNC: 137 MMOL/L — SIGNIFICANT CHANGE UP (ref 135–145)
SODIUM SERPL-SCNC: 140 MMOL/L — SIGNIFICANT CHANGE UP (ref 135–145)
WBC # BLD: 4.6 K/UL — SIGNIFICANT CHANGE UP (ref 3.8–10.5)
WBC # BLD: 8 K/UL — SIGNIFICANT CHANGE UP (ref 3.8–10.5)
WBC # FLD AUTO: 4.6 K/UL — SIGNIFICANT CHANGE UP (ref 3.8–10.5)
WBC # FLD AUTO: 8 K/UL — SIGNIFICANT CHANGE UP (ref 3.8–10.5)

## 2017-08-20 PROCEDURE — 99232 SBSQ HOSP IP/OBS MODERATE 35: CPT

## 2017-08-20 RX ORDER — I.V. FAT EMULSION 20 G/100ML
50 EMULSION INTRAVENOUS
Qty: 0 | Refills: 0 | Status: DISCONTINUED | OUTPATIENT
Start: 2017-08-20 | End: 2017-08-21

## 2017-08-20 RX ORDER — ELECTROLYTE SOLUTION,INJ
1 VIAL (ML) INTRAVENOUS
Qty: 0 | Refills: 0 | Status: DISCONTINUED | OUTPATIENT
Start: 2017-08-20 | End: 2017-08-20

## 2017-08-20 RX ADMIN — HYDROMORPHONE HYDROCHLORIDE 1 MILLIGRAM(S): 2 INJECTION INTRAMUSCULAR; INTRAVENOUS; SUBCUTANEOUS at 10:15

## 2017-08-20 RX ADMIN — Medication 1 EACH: at 17:12

## 2017-08-20 RX ADMIN — HEPARIN SODIUM 5000 UNIT(S): 5000 INJECTION INTRAVENOUS; SUBCUTANEOUS at 13:33

## 2017-08-20 RX ADMIN — HYDROMORPHONE HYDROCHLORIDE 1 MILLIGRAM(S): 2 INJECTION INTRAMUSCULAR; INTRAVENOUS; SUBCUTANEOUS at 14:31

## 2017-08-20 RX ADMIN — HYDROMORPHONE HYDROCHLORIDE 1 MILLIGRAM(S): 2 INJECTION INTRAMUSCULAR; INTRAVENOUS; SUBCUTANEOUS at 22:53

## 2017-08-20 RX ADMIN — HYDROMORPHONE HYDROCHLORIDE 1 MILLIGRAM(S): 2 INJECTION INTRAMUSCULAR; INTRAVENOUS; SUBCUTANEOUS at 09:19

## 2017-08-20 RX ADMIN — HYDROMORPHONE HYDROCHLORIDE 0.5 MILLIGRAM(S): 2 INJECTION INTRAMUSCULAR; INTRAVENOUS; SUBCUTANEOUS at 12:00

## 2017-08-20 RX ADMIN — MEROPENEM 100 MILLIGRAM(S): 1 INJECTION INTRAVENOUS at 05:15

## 2017-08-20 RX ADMIN — HEPARIN SODIUM 5000 UNIT(S): 5000 INJECTION INTRAVENOUS; SUBCUTANEOUS at 05:15

## 2017-08-20 RX ADMIN — HEPARIN SODIUM 5000 UNIT(S): 5000 INJECTION INTRAVENOUS; SUBCUTANEOUS at 23:57

## 2017-08-20 RX ADMIN — HYDROMORPHONE HYDROCHLORIDE 1 MILLIGRAM(S): 2 INJECTION INTRAMUSCULAR; INTRAVENOUS; SUBCUTANEOUS at 23:58

## 2017-08-20 RX ADMIN — HYDROMORPHONE HYDROCHLORIDE 1 MILLIGRAM(S): 2 INJECTION INTRAMUSCULAR; INTRAVENOUS; SUBCUTANEOUS at 05:16

## 2017-08-20 RX ADMIN — HYDROMORPHONE HYDROCHLORIDE 1 MILLIGRAM(S): 2 INJECTION INTRAMUSCULAR; INTRAVENOUS; SUBCUTANEOUS at 01:00

## 2017-08-20 RX ADMIN — NYSTATIN CREAM 1 APPLICATION(S): 100000 CREAM TOPICAL at 16:50

## 2017-08-20 RX ADMIN — PANTOPRAZOLE SODIUM 40 MILLIGRAM(S): 20 TABLET, DELAYED RELEASE ORAL at 11:32

## 2017-08-20 RX ADMIN — HYDROMORPHONE HYDROCHLORIDE 1 MILLIGRAM(S): 2 INJECTION INTRAMUSCULAR; INTRAVENOUS; SUBCUTANEOUS at 15:11

## 2017-08-20 RX ADMIN — HYDROMORPHONE HYDROCHLORIDE 0.5 MILLIGRAM(S): 2 INJECTION INTRAMUSCULAR; INTRAVENOUS; SUBCUTANEOUS at 11:32

## 2017-08-20 RX ADMIN — NYSTATIN CREAM 1 APPLICATION(S): 100000 CREAM TOPICAL at 05:17

## 2017-08-20 RX ADMIN — HYDROMORPHONE HYDROCHLORIDE 1 MILLIGRAM(S): 2 INJECTION INTRAMUSCULAR; INTRAVENOUS; SUBCUTANEOUS at 00:57

## 2017-08-20 NOTE — PROGRESS NOTE ADULT - SUBJECTIVE AND OBJECTIVE BOX
Patient is a 57y old  Female who presents with a chief complaint of enterocutaneous fistula (24 Jul 2017 14:15)        INTERVAL HPI/OVERNIGHT EVENTS: none    SYMPTOMS no pain , nausea, sob    DRIPS tpn        ICU Vital Signs Last 24 Hrs  T(C): 36.6 (20 Aug 2017 09:25), Max: 37.6 (20 Aug 2017 06:38)  T(F): 97.9 (20 Aug 2017 09:25), Max: 99.7 (20 Aug 2017 06:38)  HR: 70 (20 Aug 2017 09:00) (64 - 80)  BP: 143/83 (20 Aug 2017 09:00) (79/47 - 167/62)  BP(mean): 103 (20 Aug 2017 09:00) (59 - 114)  ABP: --  ABP(mean): --  RR: 28 (20 Aug 2017 09:00) (18 - 37)  SpO2: 93% (20 Aug 2017 09:00) (91% - 96%)      I&O's Summary    19 Aug 2017 07:01  -  20 Aug 2017 07:00  --------------------------------------------------------  IN: 2014.6 mL / OUT: 900 mL / NET: 1114.6 mL    20 Aug 2017 07:01  -  20 Aug 2017 10:10  --------------------------------------------------------  IN: 169.8 mL / OUT: 255 mL / NET: -85.2 mL        EXAM    Chest clear    Heart rr    Abdomen soft with bs    Extremities warm    Neuro awake alert      LABS:  CAPILLARY BLOOD GLUCOSE  126 (20 Aug 2017 06:00)  132 (19 Aug 2017 22:00)  126 (19 Aug 2017 17:00)  117 (19 Aug 2017 12:00)                            9.2    8.0   )-----------( 341      ( 20 Aug 2017 06:38 )             29.1     08-20    137  |  103  |  19  ----------------------------<  120<H>  4.3   |  23  |  0.40<L>    Ca    8.8      20 Aug 2017 06:38  Phos  3.4     08-20  Mg     2.2     08-20                RADIOLOGY & ADDITIONAL STUDIES:    CRITICAL CARE TIME SPENT:

## 2017-08-20 NOTE — PROGRESS NOTE ADULT - SUBJECTIVE AND OBJECTIVE BOX
S: No new issues/events overnight, no new med c/o    O: ICU Vital Signs Last 24 Hrs  T(F): 97.9 (08-20-17 @ 09:25), Max: 99.7 (08-20-17 @ 06:38)  HR: 74 (08-20-17 @ 10:00) (64 - 80)  BP: 135/63 (08-20-17 @ 10:00) (79/47 - 167/62)  BP(mean): 90 (08-20-17 @ 10:00) (59 - 114)  ABP: --  RR: 25 (08-20-17 @ 10:00) (18 - 37)  SpO2: 90% (08-20-17 @ 10:00) (90% - 96%)    PHYSICAL EXAM:         LABS:    08-20    137  |  103  |  19  ----------------------------<  120<H>  4.3   |  23  |  0.40<L>    Ca    8.8      20 Aug 2017 06:38  Phos  3.4     08-20  Mg     2.2     08-20                          9.2    8.0   )-----------( 341      ( 20 Aug 2017 06:38 )             29.1     CAPILLARY BLOOD GLUCOSE  126 (20 Aug 2017 06:00)  132 (19 Aug 2017 22:00)  126 (19 Aug 2017 17:00)  117 (19 Aug 2017 12:00)        MEDICATIONS  (STANDING):  insulin lispro (HumaLOG) corrective regimen sliding scale   SubCutaneous every 6 hours  sodium chloride 0.9% lock flush 20 milliLiter(s) IV Push once  cyanocobalamin Injectable 1000 MICROGram(s) SubCutaneous every 7 days  dextrose 50% Injectable 12.5 Gram(s) IV Push once  nystatin Powder 1 Application(s) Topical two times a day  pantoprazole  Injectable 40 milliGRAM(s) IV Push daily  calcitriol Injectable 1 MICROGram(s) IV Push <User Schedule>  heparin  Injectable 5000 Unit(s) SubCutaneous every 8 hours  Parenteral Nutrition - Adult 1 Each (64 mL/Hr) TPN Continuous <Continuous>  Parenteral Nutrition - Adult 1 Each (64 mL/Hr) TPN Continuous <Continuous>  fat emulsion 20% Infusion 50 Gram(s) (20.8 mL/Hr) IV Continuous <Continuous>    MEDICATIONS  (PRN):  ondansetron Injectable 4 milliGRAM(s) IV Push every 6 hours PRN Nausea  sodium chloride 0.9% lock flush 10 milliLiter(s) IV Push every 1 hour PRN After each medication administration  sodium chloride 0.9% lock flush 10 milliLiter(s) IV Push every 12 hours PRN Lumen of catheter NOT used  HYDROmorphone  Injectable 0.5 milliGRAM(s) IV Push every 4 hours PRN Moderate Pain (4 - 6)  HYDROmorphone  Injectable 1 milliGRAM(s) IV Push every 4 hours PRN Severe Pain (7 - 10)      Yu:	  [ x] None	[ ] Daily Yu Order Placed	   Indication:	  [ ] Strict I and O's    [ ] Obstruction     [ ] Incontinence + Stage 3 or 4 Decubitus  Central Line:  [ ] None	   [x ]  Medication / TPN Administration     [ ] No Peripheral IV S: No new issues/events overnight, no new med c/o    O: ICU Vital Signs Last 24 Hrs  T(F): 97.9 (08-20-17 @ 09:25), Max: 99.7 (08-20-17 @ 06:38)  HR: 74 (08-20-17 @ 10:00) (64 - 80)  BP: 135/63 (08-20-17 @ 10:00) (79/47 - 167/62)  BP(mean): 90 (08-20-17 @ 10:00) (59 - 114)  ABP: --  RR: 25 (08-20-17 @ 10:00) (18 - 37)  SpO2: 90% (08-20-17 @ 10:00) (90% - 96%)    PHYSICAL EXAM:   Neurological: AAOx3, CNII-XII intact,  strength 5/5 b/l  Cardiovascular: RRR  Respiratory: CTA  Gastrointestinal: soft, NT, midline vac dsg intact, left sided CHECO x 2 with cloudy/thick dark drainage. malecot with bilious drainage.   Extremities: warm, no dependent edema  Vascular: no cyanosis/erythema      LABS:    08-20    137  |  103  |  19  ----------------------------<  120<H>  4.3   |  23  |  0.40<L>    Ca    8.8      20 Aug 2017 06:38  Phos  3.4     08-20  Mg     2.2     08-20                          9.2    8.0   )-----------( 341      ( 20 Aug 2017 06:38 )             29.1     CAPILLARY BLOOD GLUCOSE  126 (20 Aug 2017 06:00)  132 (19 Aug 2017 22:00)  126 (19 Aug 2017 17:00)  117 (19 Aug 2017 12:00)        MEDICATIONS  (STANDING):  insulin lispro (HumaLOG) corrective regimen sliding scale   SubCutaneous every 6 hours  sodium chloride 0.9% lock flush 20 milliLiter(s) IV Push once  cyanocobalamin Injectable 1000 MICROGram(s) SubCutaneous every 7 days  dextrose 50% Injectable 12.5 Gram(s) IV Push once  nystatin Powder 1 Application(s) Topical two times a day  pantoprazole  Injectable 40 milliGRAM(s) IV Push daily  calcitriol Injectable 1 MICROGram(s) IV Push <User Schedule>  heparin  Injectable 5000 Unit(s) SubCutaneous every 8 hours  Parenteral Nutrition - Adult 1 Each (64 mL/Hr) TPN Continuous <Continuous>  Parenteral Nutrition - Adult 1 Each (64 mL/Hr) TPN Continuous <Continuous>  fat emulsion 20% Infusion 50 Gram(s) (20.8 mL/Hr) IV Continuous <Continuous>    MEDICATIONS  (PRN):  ondansetron Injectable 4 milliGRAM(s) IV Push every 6 hours PRN Nausea  sodium chloride 0.9% lock flush 10 milliLiter(s) IV Push every 1 hour PRN After each medication administration  sodium chloride 0.9% lock flush 10 milliLiter(s) IV Push every 12 hours PRN Lumen of catheter NOT used  HYDROmorphone  Injectable 0.5 milliGRAM(s) IV Push every 4 hours PRN Moderate Pain (4 - 6)  HYDROmorphone  Injectable 1 milliGRAM(s) IV Push every 4 hours PRN Severe Pain (7 - 10)      Yu:	  [ x] None	[ ] Daily Yu Order Placed	   Indication:	  [ ] Strict I and O's    [ ] Obstruction     [ ] Incontinence + Stage 3 or 4 Decubitus  Central Line:  [ ] None	   [x ]  Medication / TPN Administration     [ ] No Peripheral IV

## 2017-08-20 NOTE — PROGRESS NOTE ADULT - ASSESSMENT
57 F s/p  SBR resection, fistula resection, esophagojejunostomy revision w/ post-op course complicated by RTOR for distal anastomosis breakdown, ATN, acute respiratory failure, & septic shock.     Neuro: Dilaudid  PRN; ENT consult re: hearing loss   CV: MAP 65  Resp: Satting well on NC   GI/FEN: NPO, TPN/lipids, Vit B12, Protonix  : Voids  Endo: ISS calcitriol 2* vit d def.   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 unable to start hep 2* to bleeding.   Drains: LLIGLESIA KESSLER. LUQ CHECO, Malecot in enterotomy site, wound vac to midline  Wounds: Midline xiphoid to pubis incision; DTI & stage 2 pressure ulcer noted.

## 2017-08-21 LAB
ALBUMIN SERPL ELPH-MCNC: 2.3 G/DL — LOW (ref 3.3–5)
ALP SERPL-CCNC: 195 U/L — HIGH (ref 40–120)
ALT FLD-CCNC: 18 U/L — SIGNIFICANT CHANGE UP (ref 10–45)
ANION GAP SERPL CALC-SCNC: 12 MMOL/L — SIGNIFICANT CHANGE UP (ref 5–17)
AST SERPL-CCNC: 19 U/L — SIGNIFICANT CHANGE UP (ref 10–40)
BILIRUB FLD-MCNC: 1.8 MG/DL — SIGNIFICANT CHANGE UP
BILIRUB SERPL-MCNC: 0.9 MG/DL — SIGNIFICANT CHANGE UP (ref 0.2–1.2)
BUN SERPL-MCNC: 22 MG/DL — SIGNIFICANT CHANGE UP (ref 7–23)
CALCIUM SERPL-MCNC: 9 MG/DL — SIGNIFICANT CHANGE UP (ref 8.4–10.5)
CHLORIDE SERPL-SCNC: 104 MMOL/L — SIGNIFICANT CHANGE UP (ref 96–108)
CO2 SERPL-SCNC: 21 MMOL/L — LOW (ref 22–31)
CREAT SERPL-MCNC: 0.4 MG/DL — LOW (ref 0.5–1.3)
GLUCOSE SERPL-MCNC: 135 MG/DL — HIGH (ref 70–99)
HCT VFR BLD CALC: 29.2 % — LOW (ref 34.5–45)
HGB BLD-MCNC: 9.3 G/DL — LOW (ref 11.5–15.5)
MAGNESIUM SERPL-MCNC: 2.3 MG/DL — SIGNIFICANT CHANGE UP (ref 1.6–2.6)
MCHC RBC-ENTMCNC: 28.9 PG — SIGNIFICANT CHANGE UP (ref 27–34)
MCHC RBC-ENTMCNC: 31.8 G/DL — LOW (ref 32–36)
MCV RBC AUTO: 90.7 FL — SIGNIFICANT CHANGE UP (ref 80–100)
PHOSPHATE SERPL-MCNC: 3.4 MG/DL — SIGNIFICANT CHANGE UP (ref 2.5–4.5)
PLATELET # BLD AUTO: 367 K/UL — SIGNIFICANT CHANGE UP (ref 150–400)
POTASSIUM SERPL-MCNC: 4.2 MMOL/L — SIGNIFICANT CHANGE UP (ref 3.5–5.3)
POTASSIUM SERPL-SCNC: 4.2 MMOL/L — SIGNIFICANT CHANGE UP (ref 3.5–5.3)
PREALB SERPL-MCNC: 21 MG/DL — SIGNIFICANT CHANGE UP (ref 20–40)
PROT SERPL-MCNC: 7 G/DL — SIGNIFICANT CHANGE UP (ref 6–8.3)
RBC # BLD: 3.22 M/UL — LOW (ref 3.8–5.2)
RBC # FLD: 17.8 % — HIGH (ref 10.3–16.9)
SODIUM SERPL-SCNC: 137 MMOL/L — SIGNIFICANT CHANGE UP (ref 135–145)
TRIGL SERPL-MCNC: 203 MG/DL — HIGH (ref 10–149)
WBC # BLD: 9.1 K/UL — SIGNIFICANT CHANGE UP (ref 3.8–10.5)
WBC # FLD AUTO: 9.1 K/UL — SIGNIFICANT CHANGE UP (ref 3.8–10.5)

## 2017-08-21 PROCEDURE — 99233 SBSQ HOSP IP/OBS HIGH 50: CPT | Mod: GC

## 2017-08-21 RX ORDER — ELECTROLYTE SOLUTION,INJ
1 VIAL (ML) INTRAVENOUS
Qty: 0 | Refills: 0 | Status: DISCONTINUED | OUTPATIENT
Start: 2017-08-21 | End: 2017-08-21

## 2017-08-21 RX ORDER — DIPHENHYDRAMINE HCL 50 MG
25 CAPSULE ORAL ONCE
Qty: 0 | Refills: 0 | Status: COMPLETED | OUTPATIENT
Start: 2017-08-21 | End: 2017-08-21

## 2017-08-21 RX ORDER — I.V. FAT EMULSION 20 G/100ML
50 EMULSION INTRAVENOUS
Qty: 0 | Refills: 0 | Status: DISCONTINUED | OUTPATIENT
Start: 2017-08-21 | End: 2017-08-22

## 2017-08-21 RX ORDER — HYDROMORPHONE HYDROCHLORIDE 2 MG/ML
1 INJECTION INTRAMUSCULAR; INTRAVENOUS; SUBCUTANEOUS ONCE
Qty: 0 | Refills: 0 | Status: DISCONTINUED | OUTPATIENT
Start: 2017-08-21 | End: 2017-08-21

## 2017-08-21 RX ADMIN — HYDROMORPHONE HYDROCHLORIDE 1 MILLIGRAM(S): 2 INJECTION INTRAMUSCULAR; INTRAVENOUS; SUBCUTANEOUS at 23:27

## 2017-08-21 RX ADMIN — HYDROMORPHONE HYDROCHLORIDE 0.5 MILLIGRAM(S): 2 INJECTION INTRAMUSCULAR; INTRAVENOUS; SUBCUTANEOUS at 16:10

## 2017-08-21 RX ADMIN — HEPARIN SODIUM 5000 UNIT(S): 5000 INJECTION INTRAVENOUS; SUBCUTANEOUS at 22:44

## 2017-08-21 RX ADMIN — I.V. FAT EMULSION 20.83 GRAM(S): 20 EMULSION INTRAVENOUS at 22:48

## 2017-08-21 RX ADMIN — HEPARIN SODIUM 5000 UNIT(S): 5000 INJECTION INTRAVENOUS; SUBCUTANEOUS at 07:32

## 2017-08-21 RX ADMIN — NYSTATIN CREAM 1 APPLICATION(S): 100000 CREAM TOPICAL at 18:22

## 2017-08-21 RX ADMIN — PANTOPRAZOLE SODIUM 40 MILLIGRAM(S): 20 TABLET, DELAYED RELEASE ORAL at 11:25

## 2017-08-21 RX ADMIN — HYDROMORPHONE HYDROCHLORIDE 1 MILLIGRAM(S): 2 INJECTION INTRAMUSCULAR; INTRAVENOUS; SUBCUTANEOUS at 12:31

## 2017-08-21 RX ADMIN — HYDROMORPHONE HYDROCHLORIDE 1 MILLIGRAM(S): 2 INJECTION INTRAMUSCULAR; INTRAVENOUS; SUBCUTANEOUS at 08:11

## 2017-08-21 RX ADMIN — NYSTATIN CREAM 1 APPLICATION(S): 100000 CREAM TOPICAL at 07:32

## 2017-08-21 RX ADMIN — CALCITRIOL 1 MICROGRAM(S): 0.5 CAPSULE ORAL at 13:33

## 2017-08-21 RX ADMIN — HYDROMORPHONE HYDROCHLORIDE 1 MILLIGRAM(S): 2 INJECTION INTRAMUSCULAR; INTRAVENOUS; SUBCUTANEOUS at 12:45

## 2017-08-21 RX ADMIN — HYDROMORPHONE HYDROCHLORIDE 1 MILLIGRAM(S): 2 INJECTION INTRAMUSCULAR; INTRAVENOUS; SUBCUTANEOUS at 13:15

## 2017-08-21 RX ADMIN — HYDROMORPHONE HYDROCHLORIDE 1 MILLIGRAM(S): 2 INJECTION INTRAMUSCULAR; INTRAVENOUS; SUBCUTANEOUS at 09:00

## 2017-08-21 RX ADMIN — HEPARIN SODIUM 5000 UNIT(S): 5000 INJECTION INTRAVENOUS; SUBCUTANEOUS at 13:33

## 2017-08-21 RX ADMIN — HYDROMORPHONE HYDROCHLORIDE 1 MILLIGRAM(S): 2 INJECTION INTRAMUSCULAR; INTRAVENOUS; SUBCUTANEOUS at 13:00

## 2017-08-21 RX ADMIN — Medication 25 MILLIGRAM(S): at 00:40

## 2017-08-21 RX ADMIN — HYDROMORPHONE HYDROCHLORIDE 1 MILLIGRAM(S): 2 INJECTION INTRAMUSCULAR; INTRAVENOUS; SUBCUTANEOUS at 08:59

## 2017-08-21 RX ADMIN — HYDROMORPHONE HYDROCHLORIDE 1 MILLIGRAM(S): 2 INJECTION INTRAMUSCULAR; INTRAVENOUS; SUBCUTANEOUS at 04:30

## 2017-08-21 RX ADMIN — Medication 1 EACH: at 18:20

## 2017-08-21 RX ADMIN — HYDROMORPHONE HYDROCHLORIDE 1 MILLIGRAM(S): 2 INJECTION INTRAMUSCULAR; INTRAVENOUS; SUBCUTANEOUS at 06:18

## 2017-08-21 RX ADMIN — HYDROMORPHONE HYDROCHLORIDE 0.5 MILLIGRAM(S): 2 INJECTION INTRAMUSCULAR; INTRAVENOUS; SUBCUTANEOUS at 16:25

## 2017-08-21 NOTE — PROGRESS NOTE ADULT - ASSESSMENT
57 F s/p  SBR resection, fistula resection, esophagojejunostomy revision w/ post-op course complicated by RTOR for distal anastomosis breakdown, ATN, acute respiratory failure, & septic shock.     Neuro: Dilaudid  PRN  CV: MAP 65  Resp: Satting well on NC   GI/FEN: NPO, TPN/lipids, Vit B12, Protonix  : Voids  Endo: ISS calcitriol 2* vit d def.   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 unable to start hep 2* to bleeding.   Drains: LLQ CHECO. LUQ CHECO, Malecot in enterotomy site, wound vac to midline  Wounds: Midline xiphoid to pubis incision; DTI & stage 2 pressure ulcer noted.

## 2017-08-21 NOTE — PROGRESS NOTE ADULT - SUBJECTIVE AND OBJECTIVE BOX
SICU DAILY PROGRESS NOTE      INTERVAL HPI / OVERNIGHT EVENTS:   8/20: no new issues, OOB to chair  ON CHECO fluid with >>>amylase/lipase, given benadryl for itchyness  8/19: removed insulin in TPN,     MEDICATIONS:  ---NEURO-  ondansetron Injectable 4 milliGRAM(s) IV Push every 6 hours PRN  HYDROmorphone  Injectable 0.5 milliGRAM(s) IV Push every 4 hours PRN  HYDROmorphone  Injectable 1 milliGRAM(s) IV Push every 4 hours PRN  ---GI/FEN-  pantoprazole  Injectable 40 milliGRAM(s) IV Push daily  dextrose 5%. 1000 milliLiter(s) IV Continuous <Continuous>  cyanocobalamin Injectable 1000 MICROGram(s) SubCutaneous every 7 days  calcitriol Injectable 1 MICROGram(s) IV Push <User Schedule>  Parenteral Nutrition - Adult 1 Each TPN Continuous <Continuous>  fat emulsion 20% Infusion 50 Gram(s) IV Continuous <Continuous>  Parenteral Nutrition - Adult 1 Each TPN Continuous <Continuous>  fat emulsion 20% Infusion 50 Gram(s) IV Continuous <Continuous>  ---ENDO-  insulin lispro (HumaLOG) corrective regimen sliding scale   SubCutaneous every 6 hours  dextrose 50% Injectable 25 Gram(s) IV Push once  dextrose 50% Injectable 12.5 Gram(s) IV Push once  ---HEME-  heparin  Injectable 5000 Unit(s) SubCutaneous every 8 hours  ---OTHER-  sodium chloride 0.9% lock flush 10 milliLiter(s) IV Push every 1 hour PRN  sodium chloride 0.9% lock flush 10 milliLiter(s) IV Push every 12 hours PRN  sodium chloride 0.9% lock flush 20 milliLiter(s) IV Push once  nystatin Powder 1 Application(s) Topical two times a day      CENTRAL LINE: YES - L subclavian (8/10-)             Remove: NO           Indication: TPN & venous access     LI: NO    A-LINE: NO      VOLUME STATUS:   I&O's Detail    20 Aug 2017 07:01  -  21 Aug 2017 07:00  --------------------------------------------------------  IN:    Fat Emulsion 20%: 293.8 mL    TPN (Total Parenteral Nutrition): 1536 mL  Total IN: 1829.8 mL    OUT:    Bulb: 15 mL    Bulb: 190 mL    Drain: 110 mL    VAC (Vacuum Assisted Closure) System: 125 mL    Voided: 1375 mL  Total OUT: 1815 mL    Total NET: 14.8 mL      21 Aug 2017 07:01  -  21 Aug 2017 09:01  --------------------------------------------------------  IN:    Fat Emulsion 20%: 42 mL    TPN (Total Parenteral Nutrition): 128 mL  Total IN: 170 mL    OUT:  Total OUT: 0 mL    Total NET: 170 mL      VITALS:  ICU Vital Signs Last 24 Hrs  T(C): 37.3 (21 Aug 2017 06:42), Max: 37.3 (21 Aug 2017 06:42)  T(F): 99.2 (21 Aug 2017 06:42), Max: 99.2 (21 Aug 2017 06:42)  HR: 80 (21 Aug 2017 09:00) (58 - 80)  BP: 118/64 (21 Aug 2017 09:00) (104/75 - 153/72)  BP(mean): 86 (21 Aug 2017 09:00) (68 - 104)  ABP: --  ABP(mean): --  RR: 25 (21 Aug 2017 09:00) (15 - 34)  SpO2: 94% (21 Aug 2017 09:00) (90% - 95%)      CAPILLARY BLOOD GLUCOSE  146 (20 Aug 2017 16:00)  134 (20 Aug 2017 11:00)      PHYSICAL EXAM:  NEURO: A&Ox3, NAD, hearing loss improving.  HEENT: PERRL, MMM  CV: RRR  PULM: CTAB  ABD: Softly distended, less TTP. LLQ malecot drain to gravity- minimal bilious output (expected as drain is within bowel lumen); LLQ CHECO drain- minimal dark/maroon output. LUQ CHECO drain- notable dark green/SS output w/ particulate matter. Midline abd wound vac in place w/ good seal; SS/slightly brown output in canister.  : Voids  EXTR: WWP. No edema.  VASC: 2+ radial, 1+ DP pulses  MSK: No joint swelling.   SKIN: No rash. Stage 2 sacral pressure ulcer present but does not appear infected.  Psych: Affect appropriate      LABS:                        9.3    9.1   )-----------( 367      ( 21 Aug 2017 06:03 )             29.2     137  |  104  |  22  ----------------------------<  135<H>  4.2   |  21<L>  |  0.40<L>    Ca    9.0      21 Aug 2017 06:03  Phos  3.4     08-21  Mg     2.3     08-21    TPro  7.0  /  Alb  2.3<L>  /  TBili  0.9  /  DBili  x   /  AST  19  /  ALT  18  /  AlkPhos  195<H>  08-21

## 2017-08-21 NOTE — PROGRESS NOTE ADULT - SUBJECTIVE AND OBJECTIVE BOX
looks fine and doing well  will change vac  casey drain 190 for 24 hours  no odor  amylase high but not in 20000 as would expect with pure small bowel  hope that this comes down in next week or so  overall fistula well controlled with josé miguel and casey 2

## 2017-08-22 LAB
ANION GAP SERPL CALC-SCNC: 10 MMOL/L — SIGNIFICANT CHANGE UP (ref 5–17)
BUN SERPL-MCNC: 23 MG/DL — SIGNIFICANT CHANGE UP (ref 7–23)
CALCIUM SERPL-MCNC: 9.2 MG/DL — SIGNIFICANT CHANGE UP (ref 8.4–10.5)
CHLORIDE SERPL-SCNC: 103 MMOL/L — SIGNIFICANT CHANGE UP (ref 96–108)
CO2 SERPL-SCNC: 24 MMOL/L — SIGNIFICANT CHANGE UP (ref 22–31)
CREAT SERPL-MCNC: 0.4 MG/DL — LOW (ref 0.5–1.3)
GLUCOSE SERPL-MCNC: 136 MG/DL — HIGH (ref 70–99)
HCT VFR BLD CALC: 27.6 % — LOW (ref 34.5–45)
HGB BLD-MCNC: 9 G/DL — LOW (ref 11.5–15.5)
MAGNESIUM SERPL-MCNC: 2.3 MG/DL — SIGNIFICANT CHANGE UP (ref 1.6–2.6)
MCHC RBC-ENTMCNC: 29.5 PG — SIGNIFICANT CHANGE UP (ref 27–34)
MCHC RBC-ENTMCNC: 32.6 G/DL — SIGNIFICANT CHANGE UP (ref 32–36)
MCV RBC AUTO: 90.5 FL — SIGNIFICANT CHANGE UP (ref 80–100)
PHOSPHATE SERPL-MCNC: 3.7 MG/DL — SIGNIFICANT CHANGE UP (ref 2.5–4.5)
PLATELET # BLD AUTO: 360 K/UL — SIGNIFICANT CHANGE UP (ref 150–400)
POTASSIUM SERPL-MCNC: 4.4 MMOL/L — SIGNIFICANT CHANGE UP (ref 3.5–5.3)
POTASSIUM SERPL-SCNC: 4.4 MMOL/L — SIGNIFICANT CHANGE UP (ref 3.5–5.3)
RBC # BLD: 3.05 M/UL — LOW (ref 3.8–5.2)
RBC # FLD: 18.1 % — HIGH (ref 10.3–16.9)
SODIUM SERPL-SCNC: 137 MMOL/L — SIGNIFICANT CHANGE UP (ref 135–145)
WBC # BLD: 9.4 K/UL — SIGNIFICANT CHANGE UP (ref 3.8–10.5)
WBC # FLD AUTO: 9.4 K/UL — SIGNIFICANT CHANGE UP (ref 3.8–10.5)

## 2017-08-22 PROCEDURE — 99233 SBSQ HOSP IP/OBS HIGH 50: CPT | Mod: GC

## 2017-08-22 RX ORDER — DIPHENHYDRAMINE HCL 50 MG
25 CAPSULE ORAL EVERY 6 HOURS
Qty: 0 | Refills: 0 | Status: DISCONTINUED | OUTPATIENT
Start: 2017-08-22 | End: 2017-08-22

## 2017-08-22 RX ORDER — DIPHENHYDRAMINE HCL 50 MG
25 CAPSULE ORAL EVERY 6 HOURS
Qty: 0 | Refills: 0 | Status: DISCONTINUED | OUTPATIENT
Start: 2017-08-22 | End: 2017-09-17

## 2017-08-22 RX ORDER — DIPHENHYDRAMINE HCL 50 MG
25 CAPSULE ORAL ONCE
Qty: 0 | Refills: 0 | Status: COMPLETED | OUTPATIENT
Start: 2017-08-22 | End: 2017-08-22

## 2017-08-22 RX ORDER — I.V. FAT EMULSION 20 G/100ML
50 EMULSION INTRAVENOUS
Qty: 0 | Refills: 0 | Status: DISCONTINUED | OUTPATIENT
Start: 2017-08-22 | End: 2017-08-22

## 2017-08-22 RX ORDER — ELECTROLYTE SOLUTION,INJ
1 VIAL (ML) INTRAVENOUS
Qty: 0 | Refills: 0 | Status: DISCONTINUED | OUTPATIENT
Start: 2017-08-22 | End: 2017-08-22

## 2017-08-22 RX ADMIN — HEPARIN SODIUM 5000 UNIT(S): 5000 INJECTION INTRAVENOUS; SUBCUTANEOUS at 15:08

## 2017-08-22 RX ADMIN — Medication 25 MILLIGRAM(S): at 15:47

## 2017-08-22 RX ADMIN — HEPARIN SODIUM 5000 UNIT(S): 5000 INJECTION INTRAVENOUS; SUBCUTANEOUS at 22:00

## 2017-08-22 RX ADMIN — HYDROMORPHONE HYDROCHLORIDE 1 MILLIGRAM(S): 2 INJECTION INTRAMUSCULAR; INTRAVENOUS; SUBCUTANEOUS at 15:30

## 2017-08-22 RX ADMIN — HEPARIN SODIUM 5000 UNIT(S): 5000 INJECTION INTRAVENOUS; SUBCUTANEOUS at 06:34

## 2017-08-22 RX ADMIN — HYDROMORPHONE HYDROCHLORIDE 1 MILLIGRAM(S): 2 INJECTION INTRAMUSCULAR; INTRAVENOUS; SUBCUTANEOUS at 15:49

## 2017-08-22 RX ADMIN — Medication 25 MILLIGRAM(S): at 02:40

## 2017-08-22 RX ADMIN — HYDROMORPHONE HYDROCHLORIDE 1 MILLIGRAM(S): 2 INJECTION INTRAMUSCULAR; INTRAVENOUS; SUBCUTANEOUS at 11:40

## 2017-08-22 RX ADMIN — PANTOPRAZOLE SODIUM 40 MILLIGRAM(S): 20 TABLET, DELAYED RELEASE ORAL at 11:19

## 2017-08-22 RX ADMIN — HYDROMORPHONE HYDROCHLORIDE 1 MILLIGRAM(S): 2 INJECTION INTRAMUSCULAR; INTRAVENOUS; SUBCUTANEOUS at 12:15

## 2017-08-22 RX ADMIN — HYDROMORPHONE HYDROCHLORIDE 0.5 MILLIGRAM(S): 2 INJECTION INTRAMUSCULAR; INTRAVENOUS; SUBCUTANEOUS at 13:51

## 2017-08-22 RX ADMIN — NYSTATIN CREAM 1 APPLICATION(S): 100000 CREAM TOPICAL at 17:33

## 2017-08-22 RX ADMIN — HYDROMORPHONE HYDROCHLORIDE 1 MILLIGRAM(S): 2 INJECTION INTRAMUSCULAR; INTRAVENOUS; SUBCUTANEOUS at 02:22

## 2017-08-22 RX ADMIN — HYDROMORPHONE HYDROCHLORIDE 1 MILLIGRAM(S): 2 INJECTION INTRAMUSCULAR; INTRAVENOUS; SUBCUTANEOUS at 06:34

## 2017-08-22 RX ADMIN — I.V. FAT EMULSION 20.83 GRAM(S): 20 EMULSION INTRAVENOUS at 22:00

## 2017-08-22 RX ADMIN — NYSTATIN CREAM 1 APPLICATION(S): 100000 CREAM TOPICAL at 06:34

## 2017-08-22 RX ADMIN — HYDROMORPHONE HYDROCHLORIDE 0.5 MILLIGRAM(S): 2 INJECTION INTRAMUSCULAR; INTRAVENOUS; SUBCUTANEOUS at 14:49

## 2017-08-22 RX ADMIN — Medication 25 MILLIGRAM(S): at 09:00

## 2017-08-22 RX ADMIN — HYDROMORPHONE HYDROCHLORIDE 1 MILLIGRAM(S): 2 INJECTION INTRAMUSCULAR; INTRAVENOUS; SUBCUTANEOUS at 07:27

## 2017-08-22 NOTE — CHART NOTE - NSCHARTNOTEFT_GEN_A_CORE
Admitting Diagnosis:   57 F s/p  SBR resection, fistula resection, esophagojejunostomy revision w/ post-op course complicated by RTOR for distal anastomosis breakdown, ATN, acute respiratory failure, & septic shock.     PAST MEDICAL & SURGICAL HISTORY:  Reflux  Obesity  DVT (deep venous thrombosis): 2013 neck  Diverticulitis  Hypertension  Elective surgery: abodominal wall surgery  dec 2016  Elective surgery: NOV 2016 gastric bypass revision  Gastric bypass status for obesity: gastric sleeve, 6/2012  S/P breast biopsy: 2011, left  S/P colon resection: 2011  S/P knee surgery: repair 2009, 2011  right; left  Umbilical hernia: 2000  S/P appendectomy: 1975  S/P tonsillectomy: 1967    Current Nutrition Order: TPN: Central Line: 254 g D, 104 g AA, 50 g lipids, & 1771 mL fluid. Providing pt with 1770 kcal, 104 g protein, & 1858 mL fluid. GIR = 1.8    GI Issues: Distended    Pain: Being managed    Skin Integrity: DTI-sacrum    Labs:   08-22    137  |  103  |  23  ----------------------------<  136<H>  4.4   |  24  |  0.40<L>    Ca    9.2      22 Aug 2017 07:15  Phos  3.7     08-22  Mg     2.3     08-22    TPro  7.0  /  Alb  2.3<L>  /  TBili  0.9  /  DBili  x   /  AST  19  /  ALT  18  /  AlkPhos  195<H>  08-21    CAPILLARY BLOOD GLUCOSE  127 (22 Aug 2017 06:00)  132 (21 Aug 2017 22:00)  138 (21 Aug 2017 16:00)    Medications:  MEDICATIONS  (STANDING):  insulin lispro (HumaLOG) corrective regimen sliding scale   SubCutaneous every 6 hours  dextrose 5%. 1000 milliLiter(s) (50 mL/Hr) IV Continuous <Continuous>  dextrose 50% Injectable 25 Gram(s) IV Push once  sodium chloride 0.9% lock flush 20 milliLiter(s) IV Push once  cyanocobalamin Injectable 1000 MICROGram(s) SubCutaneous every 7 days  dextrose 50% Injectable 12.5 Gram(s) IV Push once  nystatin Powder 1 Application(s) Topical two times a day  pantoprazole  Injectable 40 milliGRAM(s) IV Push daily  calcitriol Injectable 1 MICROGram(s) IV Push <User Schedule>  heparin  Injectable 5000 Unit(s) SubCutaneous every 8 hours  Parenteral Nutrition - Adult 1 Each (64 mL/Hr) TPN Continuous <Continuous>  fat emulsion 20% Infusion 50 Gram(s) (20.83 mL/Hr) IV Continuous <Continuous>  Parenteral Nutrition - Adult 1 Each (64 mL/Hr) TPN Continuous <Continuous>  fat emulsion 20% Infusion 50 Gram(s) (20.8 mL/Hr) IV Continuous <Continuous>    MEDICATIONS  (PRN):  ondansetron Injectable 4 milliGRAM(s) IV Push every 6 hours PRN Nausea  sodium chloride 0.9% lock flush 10 milliLiter(s) IV Push every 1 hour PRN After each medication administration  sodium chloride 0.9% lock flush 10 milliLiter(s) IV Push every 12 hours PRN Lumen of catheter NOT used  HYDROmorphone  Injectable 0.5 milliGRAM(s) IV Push every 4 hours PRN Moderate Pain (4 - 6)  HYDROmorphone  Injectable 1 milliGRAM(s) IV Push every 4 hours PRN Severe Pain (7 - 10)      Weight:  No new weights to assess    Estimated energy needs using 52 kg IBW:  30-35 kcal/kg (2298-2772 kcal).   1.8-2 g/kg ( g protein).  30-35 mL/kg (7484-3229 mL fluid).     Subjective: N/A this visit, pt lethargic at time of visit. TPN infusing    Previous Nutrition Diagnosis:  Increased nutrient needs RT fistula & S/p surgery AEB 1.8-2 g/kg protein.     Active [  X ]  Resolved [   ]    Goal: Continue to meet % of needs via tolerated route.    Recommendations: Continue TPN as ordered.     Education: N/A this visit    Risk Level: High [  X ] Moderate [   ] Low [   ]

## 2017-08-22 NOTE — PROGRESS NOTE ADULT - SUBJECTIVE AND OBJECTIVE BOX
SICU DAILY PROGRESS NOTE      INTERVAL HPI / OVERNIGHT EVENTS:   O/N: had 2.8 second cardiac pause overnight, came back in sinus  8/21: VAC changed- Penrose drain removed & white sponge placed in succus collection at left lateral aspect of mesh. Took a few steps w/ a walker & 2 PT assistants. Bronchoalveolar lavage growing CMV.      MEDICATIONS:  ---NEURO-  ondansetron Injectable 4 milliGRAM(s) IV Push every 6 hours PRN  HYDROmorphone  Injectable 0.5 milliGRAM(s) IV Push every 4 hours PRN  HYDROmorphone  Injectable 1 milliGRAM(s) IV Push every 4 hours PRN  ---GI/FEN-  pantoprazole  Injectable 40 milliGRAM(s) IV Push daily  dextrose 5%. 1000 milliLiter(s) IV Continuous <Continuous>  cyanocobalamin Injectable 1000 MICROGram(s) SubCutaneous every 7 days  calcitriol Injectable 1 MICROGram(s) IV Push <User Schedule>  Parenteral Nutrition - Adult 1 Each TPN Continuous <Continuous>  fat emulsion 20% Infusion 50 Gram(s) IV Continuous <Continuous>  ---ENDO-  insulin lispro (HumaLOG) corrective regimen sliding scale   SubCutaneous every 6 hours  dextrose 50% Injectable 25 Gram(s) IV Push once  dextrose 50% Injectable 12.5 Gram(s) IV Push once  ---HEME-  heparin  Injectable 5000 Unit(s) SubCutaneous every 8 hours  ---OTHER-  sodium chloride 0.9% lock flush 10 milliLiter(s) IV Push every 1 hour PRN  sodium chloride 0.9% lock flush 10 milliLiter(s) IV Push every 12 hours PRN  sodium chloride 0.9% lock flush 20 milliLiter(s) IV Push once  nystatin Powder 1 Application(s) Topical two times a day      CENTRAL LINE: YES - L subclavian (8/10-)             Remove: NO           Indication: TPN & venous access     LI: NO    A-LINE: NO      VOLUME STATUS:   I&O's Detail    21 Aug 2017 07:01  -  22 Aug 2017 07:00  --------------------------------------------------------  IN:    Fat Emulsion 20%: 293 mL    TPN (Total Parenteral Nutrition): 1536 mL  Total IN: 1829 mL    OUT:    Bulb: 52 mL    Bulb: 12 mL    Drain: 45 mL    Voided: 2125 mL  Total OUT: 2234 mL    Total NET: -405 mL      22 Aug 2017 07:01  -  22 Aug 2017 07:48  --------------------------------------------------------  IN:    Fat Emulsion 20%: 20.8 mL    TPN (Total Parenteral Nutrition): 64 mL  Total IN: 84.8 mL    OUT:  Total OUT: 0 mL    Total NET: 84.8 mL      VITALS:  ICU Vital Signs Last 24 Hrs  T(C): 37.3 (22 Aug 2017 06:00), Max: 37.3 (21 Aug 2017 18:12)  T(F): 99.2 (22 Aug 2017 06:00), Max: 99.2 (21 Aug 2017 18:12)  HR: 74 (22 Aug 2017 07:00) (64 - 90)  BP: 157/84 (22 Aug 2017 07:00) (106/64 - 157/84)  BP(mean): 110 (22 Aug 2017 07:00) (81 - 110)  ABP: --  ABP(mean): --  RR: 20 (22 Aug 2017 07:00) (14 - 34)  SpO2: 96% (22 Aug 2017 07:00) (91% - 98%)      CAPILLARY BLOOD GLUCOSE  127 (22 Aug 2017 06:00)  132 (21 Aug 2017 22:00)  138 (21 Aug 2017 16:00)  120 (21 Aug 2017 12:00)        PHYSICAL EXAM:  NEURO: A&Ox3, NAD.  HEENT: PERRL, MMM  CV: RRR  PULM: CTAB  ABD: Softly distended, less TTP. LLQ malecot drain to gravity- minimal bilious output (expected as drain is within bowel lumen); LLQ CHECO drain- minimal dark/maroon output. LUQ CHECO drain- notable dark green/SS output w/ particulate matter. Midline abd wound vac in place w/ good seal; SS/slightly brown output in canister.  : Voids  EXTR: WWP. Mild peripheral edema.  VASC: 2+ radial, 1+ DP pulses  MSK: No joint swelling.   SKIN: No rash. Stage 2 sacral pressure ulcer present but does not appear infected.  Psych: Affect appropriate      LABS:                        9.0    9.4   )-----------( 360      ( 22 Aug 2017 07:15 )             27.6     137  |  104  |  22  ----------------------------<  135<H>  4.2   |  21<L>  |  0.40<L>    Ca    9.0      21 Aug 2017 06:03  Phos  3.4     08-21  Mg     2.3     08-21    TPro  7.0  /  Alb  2.3<L>  /  TBili  0.9  /  DBili  x   /  AST  19  /  ALT  18  /  AlkPhos  195<H>  08-21

## 2017-08-22 NOTE — PROGRESS NOTE ADULT - ASSESSMENT
57 F s/p  SBR resection, fistula resection, esophagojejunostomy revision w/ post-op course complicated by RTOR for distal anastomosis breakdown, ATN, acute respiratory failure, & septic shock.     Neuro: Dilaudid  PRN; ENT consult re: hearing loss   CV: MAP 65  Resp: Satting well on NC   GI/FEN: NPO, TPN/lipids, Vit B12, Protonix  : Voids  Endo: ISS calcitriol 2* vit d def.   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 unable to start hep 2* to bleeding.   Drains: LLQ CHECO. LUQ CHECO, Malecot in enterotomy site, wound vac to midline  Wounds: Midline xiphoid to pubis incision; DTI & stage 2 pressure ulcer noted.

## 2017-08-23 LAB
ANION GAP SERPL CALC-SCNC: 12 MMOL/L — SIGNIFICANT CHANGE UP (ref 5–17)
BUN SERPL-MCNC: 26 MG/DL — HIGH (ref 7–23)
CALCIUM SERPL-MCNC: 9.1 MG/DL — SIGNIFICANT CHANGE UP (ref 8.4–10.5)
CHLORIDE SERPL-SCNC: 104 MMOL/L — SIGNIFICANT CHANGE UP (ref 96–108)
CO2 SERPL-SCNC: 23 MMOL/L — SIGNIFICANT CHANGE UP (ref 22–31)
CREAT SERPL-MCNC: 0.5 MG/DL — SIGNIFICANT CHANGE UP (ref 0.5–1.3)
GLUCOSE SERPL-MCNC: 124 MG/DL — HIGH (ref 70–99)
HCT VFR BLD CALC: 27.5 % — LOW (ref 34.5–45)
HGB BLD-MCNC: 8.6 G/DL — LOW (ref 11.5–15.5)
MAGNESIUM SERPL-MCNC: 2.1 MG/DL — SIGNIFICANT CHANGE UP (ref 1.6–2.6)
MCHC RBC-ENTMCNC: 28.5 PG — SIGNIFICANT CHANGE UP (ref 27–34)
MCHC RBC-ENTMCNC: 31.3 G/DL — LOW (ref 32–36)
MCV RBC AUTO: 91.1 FL — SIGNIFICANT CHANGE UP (ref 80–100)
PHOSPHATE SERPL-MCNC: 3.7 MG/DL — SIGNIFICANT CHANGE UP (ref 2.5–4.5)
PLATELET # BLD AUTO: 347 K/UL — SIGNIFICANT CHANGE UP (ref 150–400)
POTASSIUM SERPL-MCNC: 4.2 MMOL/L — SIGNIFICANT CHANGE UP (ref 3.5–5.3)
POTASSIUM SERPL-SCNC: 4.2 MMOL/L — SIGNIFICANT CHANGE UP (ref 3.5–5.3)
RBC # BLD: 3.02 M/UL — LOW (ref 3.8–5.2)
RBC # FLD: 18.3 % — HIGH (ref 10.3–16.9)
SODIUM SERPL-SCNC: 139 MMOL/L — SIGNIFICANT CHANGE UP (ref 135–145)
WBC # BLD: 9.4 K/UL — SIGNIFICANT CHANGE UP (ref 3.8–10.5)
WBC # FLD AUTO: 9.4 K/UL — SIGNIFICANT CHANGE UP (ref 3.8–10.5)

## 2017-08-23 PROCEDURE — 99233 SBSQ HOSP IP/OBS HIGH 50: CPT | Mod: GC

## 2017-08-23 RX ORDER — ELECTROLYTE SOLUTION,INJ
1 VIAL (ML) INTRAVENOUS
Qty: 0 | Refills: 0 | Status: DISCONTINUED | OUTPATIENT
Start: 2017-08-23 | End: 2017-08-23

## 2017-08-23 RX ORDER — I.V. FAT EMULSION 20 G/100ML
50 EMULSION INTRAVENOUS
Qty: 0 | Refills: 0 | Status: DISCONTINUED | OUTPATIENT
Start: 2017-08-23 | End: 2017-08-23

## 2017-08-23 RX ADMIN — HYDROMORPHONE HYDROCHLORIDE 1 MILLIGRAM(S): 2 INJECTION INTRAMUSCULAR; INTRAVENOUS; SUBCUTANEOUS at 06:59

## 2017-08-23 RX ADMIN — HYDROMORPHONE HYDROCHLORIDE 1 MILLIGRAM(S): 2 INJECTION INTRAMUSCULAR; INTRAVENOUS; SUBCUTANEOUS at 23:20

## 2017-08-23 RX ADMIN — I.V. FAT EMULSION 20.83 GRAM(S): 20 EMULSION INTRAVENOUS at 22:00

## 2017-08-23 RX ADMIN — Medication 25 MILLIGRAM(S): at 03:38

## 2017-08-23 RX ADMIN — HYDROMORPHONE HYDROCHLORIDE 0.5 MILLIGRAM(S): 2 INJECTION INTRAMUSCULAR; INTRAVENOUS; SUBCUTANEOUS at 02:01

## 2017-08-23 RX ADMIN — HYDROMORPHONE HYDROCHLORIDE 1 MILLIGRAM(S): 2 INJECTION INTRAMUSCULAR; INTRAVENOUS; SUBCUTANEOUS at 11:25

## 2017-08-23 RX ADMIN — HYDROMORPHONE HYDROCHLORIDE 1 MILLIGRAM(S): 2 INJECTION INTRAMUSCULAR; INTRAVENOUS; SUBCUTANEOUS at 22:47

## 2017-08-23 RX ADMIN — HYDROMORPHONE HYDROCHLORIDE 1 MILLIGRAM(S): 2 INJECTION INTRAMUSCULAR; INTRAVENOUS; SUBCUTANEOUS at 11:40

## 2017-08-23 RX ADMIN — HYDROMORPHONE HYDROCHLORIDE 0.5 MILLIGRAM(S): 2 INJECTION INTRAMUSCULAR; INTRAVENOUS; SUBCUTANEOUS at 12:25

## 2017-08-23 RX ADMIN — NYSTATIN CREAM 1 APPLICATION(S): 100000 CREAM TOPICAL at 17:04

## 2017-08-23 RX ADMIN — NYSTATIN CREAM 1 APPLICATION(S): 100000 CREAM TOPICAL at 06:20

## 2017-08-23 RX ADMIN — HEPARIN SODIUM 5000 UNIT(S): 5000 INJECTION INTRAVENOUS; SUBCUTANEOUS at 06:04

## 2017-08-23 RX ADMIN — HYDROMORPHONE HYDROCHLORIDE 1 MILLIGRAM(S): 2 INJECTION INTRAMUSCULAR; INTRAVENOUS; SUBCUTANEOUS at 06:37

## 2017-08-23 RX ADMIN — Medication 25 MILLIGRAM(S): at 15:45

## 2017-08-23 RX ADMIN — HYDROMORPHONE HYDROCHLORIDE 1 MILLIGRAM(S): 2 INJECTION INTRAMUSCULAR; INTRAVENOUS; SUBCUTANEOUS at 16:10

## 2017-08-23 RX ADMIN — HYDROMORPHONE HYDROCHLORIDE 1 MILLIGRAM(S): 2 INJECTION INTRAMUSCULAR; INTRAVENOUS; SUBCUTANEOUS at 15:30

## 2017-08-23 RX ADMIN — Medication 25 MILLIGRAM(S): at 22:47

## 2017-08-23 RX ADMIN — CALCITRIOL 1 MICROGRAM(S): 0.5 CAPSULE ORAL at 12:25

## 2017-08-23 RX ADMIN — HEPARIN SODIUM 5000 UNIT(S): 5000 INJECTION INTRAVENOUS; SUBCUTANEOUS at 22:00

## 2017-08-23 RX ADMIN — HYDROMORPHONE HYDROCHLORIDE 1 MILLIGRAM(S): 2 INJECTION INTRAMUSCULAR; INTRAVENOUS; SUBCUTANEOUS at 02:36

## 2017-08-23 RX ADMIN — HYDROMORPHONE HYDROCHLORIDE 0.5 MILLIGRAM(S): 2 INJECTION INTRAMUSCULAR; INTRAVENOUS; SUBCUTANEOUS at 02:30

## 2017-08-23 RX ADMIN — PREGABALIN 1000 MICROGRAM(S): 225 CAPSULE ORAL at 12:26

## 2017-08-23 RX ADMIN — HYDROMORPHONE HYDROCHLORIDE 0.5 MILLIGRAM(S): 2 INJECTION INTRAMUSCULAR; INTRAVENOUS; SUBCUTANEOUS at 12:42

## 2017-08-23 RX ADMIN — HYDROMORPHONE HYDROCHLORIDE 1 MILLIGRAM(S): 2 INJECTION INTRAMUSCULAR; INTRAVENOUS; SUBCUTANEOUS at 03:15

## 2017-08-23 RX ADMIN — PANTOPRAZOLE SODIUM 40 MILLIGRAM(S): 20 TABLET, DELAYED RELEASE ORAL at 12:26

## 2017-08-23 RX ADMIN — Medication 25 MILLIGRAM(S): at 09:40

## 2017-08-23 RX ADMIN — HEPARIN SODIUM 5000 UNIT(S): 5000 INJECTION INTRAVENOUS; SUBCUTANEOUS at 14:53

## 2017-08-23 NOTE — PROGRESS NOTE ADULT - SUBJECTIVE AND OBJECTIVE BOX
SICU DAILY PROGRESS NOTE      INTERVAL HPI / OVERNIGHT EVENTS:   o/n: no issues  8/22: SDU  O/N: had 2.8 second cardiac pause overnight, came back in sinus  8/21: VAC changed- Penrose drain removed & white sponge placed in succus collection     MEDICATIONS:  ---NEURO-  ondansetron Injectable 4 milliGRAM(s) IV Push every 6 hours PRN  HYDROmorphone  Injectable 0.5 milliGRAM(s) IV Push every 4 hours PRN  HYDROmorphone  Injectable 1 milliGRAM(s) IV Push every 4 hours PRN  ---PULM-  diphenhydrAMINE   Injectable 25 milliGRAM(s) IV Push every 6 hours PRN  ---GI/FEN-  pantoprazole  Injectable 40 milliGRAM(s) IV Push daily  dextrose 5%. 1000 milliLiter(s) IV Continuous <Continuous>  cyanocobalamin Injectable 1000 MICROGram(s) SubCutaneous every 7 days  calcitriol Injectable 1 MICROGram(s) IV Push <User Schedule>  Parenteral Nutrition - Adult 1 Each TPN Continuous <Continuous>  fat emulsion 20% Infusion 50 Gram(s) IV Continuous <Continuous>  Parenteral Nutrition - Adult 1 Each TPN Continuous <Continuous>  ---ENDO-  insulin lispro (HumaLOG) corrective regimen sliding scale   SubCutaneous every 6 hours  dextrose 50% Injectable 25 Gram(s) IV Push once  dextrose 50% Injectable 12.5 Gram(s) IV Push once  ---HEME-  heparin  Injectable 5000 Unit(s) SubCutaneous every 8 hours  ---OTHER-  sodium chloride 0.9% lock flush 10 milliLiter(s) IV Push every 1 hour PRN  sodium chloride 0.9% lock flush 10 milliLiter(s) IV Push every 12 hours PRN  sodium chloride 0.9% lock flush 20 milliLiter(s) IV Push once  nystatin Powder 1 Application(s) Topical two times a day      CENTRAL LINE: YES - L subclavian (8/10-)             Remove: NO           Indication: TPN & venous access     IL: NO    A-LINE: NO      VOLUME STATUS:   I&O's Detail    22 Aug 2017 07:01  -  23 Aug 2017 07:00  --------------------------------------------------------  IN:    Fat Emulsion 20%: 209.8 mL    TPN (Total Parenteral Nutrition): 1408 mL  Total IN: 1617.8 mL    OUT:    Bulb: 120 mL    Bulb: 17 mL    Drain: 155 mL    VAC (Vacuum Assisted Closure) System: 350 mL    Voided: 1275 mL  Total OUT: 1917 mL    Total NET: -299.2 mL      23 Aug 2017 07:01  -  23 Aug 2017 10:13  --------------------------------------------------------  IN:    Fat Emulsion 20%: 42 mL    TPN (Total Parenteral Nutrition): 192 mL  Total IN: 234 mL    OUT:    Voided: 175 mL  Total OUT: 175 mL    Total NET: 59 mL      VITALS:  ICU Vital Signs Last 24 Hrs  T(C): 36.5 (23 Aug 2017 09:00), Max: 37.3 (22 Aug 2017 14:57)  T(F): 97.7 (23 Aug 2017 09:00), Max: 99.1 (22 Aug 2017 14:57)  HR: 70 (23 Aug 2017 09:00) (54 - 72)  BP: 118/73 (23 Aug 2017 09:00) (92/56 - 149/79)  BP(mean): 92 (23 Aug 2017 09:00) (75 - 109)  ABP: --  ABP(mean): --  RR: 24 (23 Aug 2017 09:00) (13 - 27)  SpO2: 95% (23 Aug 2017 09:00) (91% - 100%)      CAPILLARY BLOOD GLUCOSE  106 (23 Aug 2017 06:00)  130 (22 Aug 2017 22:00)      PHYSICAL EXAM:  NEURO: A&Ox3, NAD.  HEENT: PERRL, MMM  CV: RRR  PULM: CTAB  ABD: Softly distended, less TTP. LLQ malecot drain to gravity- minimal bilious output (expected as drain is within bowel lumen); LLQ CHECO drain- minimal dark/maroon output. LUQ CHECO drain- thick tan/orange output. Midline abd wound vac in place w/ good seal; SS/slightly brown output in canister.  : Voids  EXTR: WWP. Mild peripheral edema  VASC: 2+ radial, 1+ DP pulses  MSK: No joint swelling  SKIN: No rash. Stage 2 sacral pressure ulcer present but does not appear infected  Psych: Affect appropriate    LABS:                        8.6    9.4   )-----------( 347      ( 23 Aug 2017 06:03 )             27.5     08-23    139  |  104  |  26<H>  ----------------------------<  124<H>  4.2   |  23  |  0.50    Ca    9.1      23 Aug 2017 06:02  Phos  3.7     08-23  Mg     2.1     08-23

## 2017-08-24 LAB
ANION GAP SERPL CALC-SCNC: 11 MMOL/L — SIGNIFICANT CHANGE UP (ref 5–17)
BUN SERPL-MCNC: 29 MG/DL — HIGH (ref 7–23)
CALCIUM SERPL-MCNC: 9.4 MG/DL — SIGNIFICANT CHANGE UP (ref 8.4–10.5)
CHLORIDE SERPL-SCNC: 107 MMOL/L — SIGNIFICANT CHANGE UP (ref 96–108)
CO2 SERPL-SCNC: 22 MMOL/L — SIGNIFICANT CHANGE UP (ref 22–31)
CREAT SERPL-MCNC: 0.5 MG/DL — SIGNIFICANT CHANGE UP (ref 0.5–1.3)
GLUCOSE SERPL-MCNC: 138 MG/DL — HIGH (ref 70–99)
HCT VFR BLD CALC: 28.7 % — LOW (ref 34.5–45)
HGB BLD-MCNC: 8.9 G/DL — LOW (ref 11.5–15.5)
MAGNESIUM SERPL-MCNC: 2.3 MG/DL — SIGNIFICANT CHANGE UP (ref 1.6–2.6)
MCHC RBC-ENTMCNC: 28.8 PG — SIGNIFICANT CHANGE UP (ref 27–34)
MCHC RBC-ENTMCNC: 31 G/DL — LOW (ref 32–36)
MCV RBC AUTO: 92.9 FL — SIGNIFICANT CHANGE UP (ref 80–100)
PHOSPHATE SERPL-MCNC: 4.3 MG/DL — SIGNIFICANT CHANGE UP (ref 2.5–4.5)
PLATELET # BLD AUTO: 365 K/UL — SIGNIFICANT CHANGE UP (ref 150–400)
POTASSIUM SERPL-MCNC: 4.4 MMOL/L — SIGNIFICANT CHANGE UP (ref 3.5–5.3)
POTASSIUM SERPL-SCNC: 4.4 MMOL/L — SIGNIFICANT CHANGE UP (ref 3.5–5.3)
RBC # BLD: 3.09 M/UL — LOW (ref 3.8–5.2)
RBC # FLD: 18.9 % — HIGH (ref 10.3–16.9)
SODIUM SERPL-SCNC: 140 MMOL/L — SIGNIFICANT CHANGE UP (ref 135–145)
WBC # BLD: 9.2 K/UL — SIGNIFICANT CHANGE UP (ref 3.8–10.5)
WBC # FLD AUTO: 9.2 K/UL — SIGNIFICANT CHANGE UP (ref 3.8–10.5)

## 2017-08-24 RX ORDER — DIPHENHYDRAMINE HCL 50 MG
25 CAPSULE ORAL ONCE
Qty: 0 | Refills: 0 | Status: COMPLETED | OUTPATIENT
Start: 2017-08-24 | End: 2017-08-24

## 2017-08-24 RX ORDER — ELECTROLYTE SOLUTION,INJ
1 VIAL (ML) INTRAVENOUS
Qty: 0 | Refills: 0 | Status: DISCONTINUED | OUTPATIENT
Start: 2017-08-24 | End: 2017-08-24

## 2017-08-24 RX ORDER — I.V. FAT EMULSION 20 G/100ML
50 EMULSION INTRAVENOUS
Qty: 0 | Refills: 0 | Status: DISCONTINUED | OUTPATIENT
Start: 2017-08-24 | End: 2017-08-24

## 2017-08-24 RX ADMIN — HYDROMORPHONE HYDROCHLORIDE 1 MILLIGRAM(S): 2 INJECTION INTRAMUSCULAR; INTRAVENOUS; SUBCUTANEOUS at 03:32

## 2017-08-24 RX ADMIN — HYDROMORPHONE HYDROCHLORIDE 0.5 MILLIGRAM(S): 2 INJECTION INTRAMUSCULAR; INTRAVENOUS; SUBCUTANEOUS at 12:35

## 2017-08-24 RX ADMIN — Medication 25 MILLIGRAM(S): at 04:59

## 2017-08-24 RX ADMIN — NYSTATIN CREAM 1 APPLICATION(S): 100000 CREAM TOPICAL at 19:02

## 2017-08-24 RX ADMIN — HYDROMORPHONE HYDROCHLORIDE 0.5 MILLIGRAM(S): 2 INJECTION INTRAMUSCULAR; INTRAVENOUS; SUBCUTANEOUS at 12:56

## 2017-08-24 RX ADMIN — Medication 1 EACH: at 19:03

## 2017-08-24 RX ADMIN — PANTOPRAZOLE SODIUM 40 MILLIGRAM(S): 20 TABLET, DELAYED RELEASE ORAL at 10:02

## 2017-08-24 RX ADMIN — HEPARIN SODIUM 5000 UNIT(S): 5000 INJECTION INTRAVENOUS; SUBCUTANEOUS at 21:53

## 2017-08-24 RX ADMIN — Medication 25 MILLIGRAM(S): at 00:00

## 2017-08-24 RX ADMIN — HYDROMORPHONE HYDROCHLORIDE 1 MILLIGRAM(S): 2 INJECTION INTRAMUSCULAR; INTRAVENOUS; SUBCUTANEOUS at 16:01

## 2017-08-24 RX ADMIN — HYDROMORPHONE HYDROCHLORIDE 1 MILLIGRAM(S): 2 INJECTION INTRAMUSCULAR; INTRAVENOUS; SUBCUTANEOUS at 07:10

## 2017-08-24 RX ADMIN — HYDROMORPHONE HYDROCHLORIDE 1 MILLIGRAM(S): 2 INJECTION INTRAMUSCULAR; INTRAVENOUS; SUBCUTANEOUS at 02:50

## 2017-08-24 RX ADMIN — HEPARIN SODIUM 5000 UNIT(S): 5000 INJECTION INTRAVENOUS; SUBCUTANEOUS at 06:00

## 2017-08-24 RX ADMIN — HYDROMORPHONE HYDROCHLORIDE 0.5 MILLIGRAM(S): 2 INJECTION INTRAMUSCULAR; INTRAVENOUS; SUBCUTANEOUS at 20:20

## 2017-08-24 RX ADMIN — HYDROMORPHONE HYDROCHLORIDE 1 MILLIGRAM(S): 2 INJECTION INTRAMUSCULAR; INTRAVENOUS; SUBCUTANEOUS at 22:16

## 2017-08-24 RX ADMIN — HYDROMORPHONE HYDROCHLORIDE 1 MILLIGRAM(S): 2 INJECTION INTRAMUSCULAR; INTRAVENOUS; SUBCUTANEOUS at 12:00

## 2017-08-24 RX ADMIN — Medication 25 MILLIGRAM(S): at 11:33

## 2017-08-24 RX ADMIN — NYSTATIN CREAM 1 APPLICATION(S): 100000 CREAM TOPICAL at 06:00

## 2017-08-24 RX ADMIN — HYDROMORPHONE HYDROCHLORIDE 1 MILLIGRAM(S): 2 INJECTION INTRAMUSCULAR; INTRAVENOUS; SUBCUTANEOUS at 22:35

## 2017-08-24 RX ADMIN — HYDROMORPHONE HYDROCHLORIDE 1 MILLIGRAM(S): 2 INJECTION INTRAMUSCULAR; INTRAVENOUS; SUBCUTANEOUS at 16:15

## 2017-08-24 RX ADMIN — Medication 25 MILLIGRAM(S): at 20:05

## 2017-08-24 RX ADMIN — HYDROMORPHONE HYDROCHLORIDE 0.5 MILLIGRAM(S): 2 INJECTION INTRAMUSCULAR; INTRAVENOUS; SUBCUTANEOUS at 20:04

## 2017-08-24 RX ADMIN — HYDROMORPHONE HYDROCHLORIDE 1 MILLIGRAM(S): 2 INJECTION INTRAMUSCULAR; INTRAVENOUS; SUBCUTANEOUS at 11:00

## 2017-08-24 RX ADMIN — I.V. FAT EMULSION 20.8 GRAM(S): 20 EMULSION INTRAVENOUS at 21:53

## 2017-08-24 RX ADMIN — HYDROMORPHONE HYDROCHLORIDE 1 MILLIGRAM(S): 2 INJECTION INTRAMUSCULAR; INTRAVENOUS; SUBCUTANEOUS at 07:40

## 2017-08-24 RX ADMIN — HEPARIN SODIUM 5000 UNIT(S): 5000 INJECTION INTRAVENOUS; SUBCUTANEOUS at 14:00

## 2017-08-24 NOTE — PROGRESS NOTE ADULT - SUBJECTIVE AND OBJECTIVE BOX
SICU DAILY PROGRESS NOTE      INTERVAL HPI / OVERNIGHT EVENTS:   8/23: Vac changed.   o/n: no issues  8/22: SDU    MEDICATIONS:  ---NEURO-  ondansetron Injectable 4 milliGRAM(s) IV Push every 6 hours PRN  HYDROmorphone  Injectable 0.5 milliGRAM(s) IV Push every 4 hours PRN  HYDROmorphone  Injectable 1 milliGRAM(s) IV Push every 4 hours PRN  ---PULM-  diphenhydrAMINE   Injectable 25 milliGRAM(s) IV Push every 6 hours PRN  ---GI/FEN-  pantoprazole  Injectable 40 milliGRAM(s) IV Push daily  dextrose 5%. 1000 milliLiter(s) IV Continuous <Continuous>  cyanocobalamin Injectable 1000 MICROGram(s) SubCutaneous every 7 days  calcitriol Injectable 1 MICROGram(s) IV Push <User Schedule>  Parenteral Nutrition - Adult 1 Each TPN Continuous <Continuous>  Parenteral Nutrition - Adult 1 Each TPN Continuous <Continuous>  fat emulsion 20% Infusion 50 Gram(s) IV Continuous <Continuous>  ---ENDO-  insulin lispro (HumaLOG) corrective regimen sliding scale   SubCutaneous every 6 hours  dextrose 50% Injectable 25 Gram(s) IV Push once  dextrose 50% Injectable 12.5 Gram(s) IV Push once  ---HEME-  heparin  Injectable 5000 Unit(s) SubCutaneous every 8 hours  ---OTHER-  sodium chloride 0.9% lock flush 10 milliLiter(s) IV Push every 1 hour PRN  sodium chloride 0.9% lock flush 10 milliLiter(s) IV Push every 12 hours PRN  sodium chloride 0.9% lock flush 20 milliLiter(s) IV Push once  nystatin Powder 1 Application(s) Topical two times a day      CENTRAL LINE: YES - L subclavian (8/10-)             Remove: NO           Indication: TPN & venous access     LI: NO    A-LINE: NO      VOLUME STATUS:   I&O's Detail    23 Aug 2017 07:01  -  24 Aug 2017 07:00  --------------------------------------------------------  IN:    Fat Emulsion 20%: 252 mL    IV PiggyBack: 50 mL    TPN (Total Parenteral Nutrition): 1536 mL  Total IN: 1838 mL    OUT:    Bulb: 10 mL    Bulb: 120 mL    Drain: 80 mL    VAC (Vacuum Assisted Closure) System: 150 mL    Voided: 1100 mL  Total OUT: 1460 mL    Total NET: 378 mL      VITALS:  ICU Vital Signs Last 24 Hrs  T(C): 36.7 (23 Aug 2017 22:36), Max: 37 (23 Aug 2017 18:14)  T(F): 98 (23 Aug 2017 22:36), Max: 98.6 (23 Aug 2017 18:14)  HR: 74 (24 Aug 2017 07:00) (48 - 94)  BP: 138/73 (24 Aug 2017 07:00) (106/64 - 155/82)  BP(mean): 104 (24 Aug 2017 07:00) (78 - 119)  ABP: --  ABP(mean): --  RR: 24 (24 Aug 2017 07:00) (10 - 31)  SpO2: 95% (24 Aug 2017 07:00) (88% - 99%)      CAPILLARY BLOOD GLUCOSE  109 (24 Aug 2017 06:00)  131 (23 Aug 2017 22:00)  132 (23 Aug 2017 16:00)  119 (23 Aug 2017 12:00)      PHYSICAL EXAM:  NEURO: A&Ox3, NAD.  HEENT: PERRL, MMM  CV: RRR  PULM: CTAB  ABD: Softly distended, less TTP. LLQ malecot drain to gravity- minimal bilious output (expected as drain is within bowel lumen); LLQ CHECO drain- minimal SS/orange creamy output. LUQ CHECO drain- bilious mixed w/ creamy orange output w/ particulate matter. Midline abd wound vac in place w/ good seal; SS/slightly brown output in canister. New foul smelling odor associated w/ abdominal drains.   : Voids  EXTR: WWP. Mild peripheral edema  VASC: 2+ radial, 1+ DP pulses  MSK: No joint swelling  SKIN: No rash. Stage 2 sacral pressure ulcer present but does not appear infected  Psych: Affect appropriate, cheerful.      LABS:                        8.9    9.2   )-----------( 365      ( 24 Aug 2017 06:22 )             28.7     140  |  107  |  29<H>  ----------------------------<  138<H>  4.4   |  22  |  0.50    Ca    9.4      24 Aug 2017 06:22  Phos  4.3     08-24  Mg     2.3     08-24

## 2017-08-24 NOTE — PROGRESS NOTE ADULT - ASSESSMENT
57 F s/p  SBR resection, fistula resection, esophagojejunostomy revision w/ post-op course complicated by RTOR for distal anastomosis breakdown, ATN, acute respiratory failure, & septic shock.     Neuro: Dilaudid  PRN; ENT consult re: hearing loss   CV: MAP 65  Resp: Satting well on NC   GI/FEN: NPO, TPN/lipids, Vit B12, Protonix  : Voids  Endo: ISS calcitriol 2* vit d def.   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  Drains: LLQ CHECO. LUQ CHECO, Malecot in enterotomy site, wound vac to midline  Wounds: Midline xiphoid to pubis incision; DTI & stage 2 pressure ulcer noted.

## 2017-08-25 LAB
ANION GAP SERPL CALC-SCNC: 12 MMOL/L — SIGNIFICANT CHANGE UP (ref 5–17)
BUN SERPL-MCNC: 29 MG/DL — HIGH (ref 7–23)
CALCIUM SERPL-MCNC: 9.7 MG/DL — SIGNIFICANT CHANGE UP (ref 8.4–10.5)
CHLORIDE SERPL-SCNC: 105 MMOL/L — SIGNIFICANT CHANGE UP (ref 96–108)
CO2 SERPL-SCNC: 24 MMOL/L — SIGNIFICANT CHANGE UP (ref 22–31)
CREAT SERPL-MCNC: 0.6 MG/DL — SIGNIFICANT CHANGE UP (ref 0.5–1.3)
GLUCOSE SERPL-MCNC: 121 MG/DL — HIGH (ref 70–99)
HCT VFR BLD CALC: 30.5 % — LOW (ref 34.5–45)
HGB BLD-MCNC: 9.4 G/DL — LOW (ref 11.5–15.5)
MAGNESIUM SERPL-MCNC: 2.1 MG/DL — SIGNIFICANT CHANGE UP (ref 1.6–2.6)
MCHC RBC-ENTMCNC: 28.8 PG — SIGNIFICANT CHANGE UP (ref 27–34)
MCHC RBC-ENTMCNC: 30.8 G/DL — LOW (ref 32–36)
MCV RBC AUTO: 93.6 FL — SIGNIFICANT CHANGE UP (ref 80–100)
PHOSPHATE SERPL-MCNC: 3.7 MG/DL — SIGNIFICANT CHANGE UP (ref 2.5–4.5)
PLATELET # BLD AUTO: 341 K/UL — SIGNIFICANT CHANGE UP (ref 150–400)
POTASSIUM SERPL-MCNC: 4.3 MMOL/L — SIGNIFICANT CHANGE UP (ref 3.5–5.3)
POTASSIUM SERPL-SCNC: 4.3 MMOL/L — SIGNIFICANT CHANGE UP (ref 3.5–5.3)
RBC # BLD: 3.26 M/UL — LOW (ref 3.8–5.2)
RBC # FLD: 18.9 % — HIGH (ref 10.3–16.9)
SODIUM SERPL-SCNC: 141 MMOL/L — SIGNIFICANT CHANGE UP (ref 135–145)
WBC # BLD: 9.5 K/UL — SIGNIFICANT CHANGE UP (ref 3.8–10.5)
WBC # FLD AUTO: 9.5 K/UL — SIGNIFICANT CHANGE UP (ref 3.8–10.5)

## 2017-08-25 RX ORDER — I.V. FAT EMULSION 20 G/100ML
50 EMULSION INTRAVENOUS
Qty: 0 | Refills: 0 | Status: DISCONTINUED | OUTPATIENT
Start: 2017-08-25 | End: 2017-08-25

## 2017-08-25 RX ORDER — HYDROMORPHONE HYDROCHLORIDE 2 MG/ML
0.5 INJECTION INTRAMUSCULAR; INTRAVENOUS; SUBCUTANEOUS EVERY 4 HOURS
Qty: 0 | Refills: 0 | Status: DISCONTINUED | OUTPATIENT
Start: 2017-08-25 | End: 2017-09-01

## 2017-08-25 RX ORDER — HYDROMORPHONE HYDROCHLORIDE 2 MG/ML
1 INJECTION INTRAMUSCULAR; INTRAVENOUS; SUBCUTANEOUS EVERY 4 HOURS
Qty: 0 | Refills: 0 | Status: DISCONTINUED | OUTPATIENT
Start: 2017-08-25 | End: 2017-09-01

## 2017-08-25 RX ORDER — DIPHENHYDRAMINE HCL 50 MG
25 CAPSULE ORAL ONCE
Qty: 0 | Refills: 0 | Status: COMPLETED | OUTPATIENT
Start: 2017-08-25 | End: 2017-08-25

## 2017-08-25 RX ORDER — ELECTROLYTE SOLUTION,INJ
1 VIAL (ML) INTRAVENOUS
Qty: 0 | Refills: 0 | Status: DISCONTINUED | OUTPATIENT
Start: 2017-08-25 | End: 2017-08-25

## 2017-08-25 RX ADMIN — Medication 25 MILLIGRAM(S): at 11:10

## 2017-08-25 RX ADMIN — Medication 1 EACH: at 17:01

## 2017-08-25 RX ADMIN — HYDROMORPHONE HYDROCHLORIDE 1 MILLIGRAM(S): 2 INJECTION INTRAMUSCULAR; INTRAVENOUS; SUBCUTANEOUS at 21:50

## 2017-08-25 RX ADMIN — HYDROMORPHONE HYDROCHLORIDE 1 MILLIGRAM(S): 2 INJECTION INTRAMUSCULAR; INTRAVENOUS; SUBCUTANEOUS at 17:30

## 2017-08-25 RX ADMIN — HYDROMORPHONE HYDROCHLORIDE 1 MILLIGRAM(S): 2 INJECTION INTRAMUSCULAR; INTRAVENOUS; SUBCUTANEOUS at 22:05

## 2017-08-25 RX ADMIN — Medication 25 MILLIGRAM(S): at 18:08

## 2017-08-25 RX ADMIN — HEPARIN SODIUM 5000 UNIT(S): 5000 INJECTION INTRAVENOUS; SUBCUTANEOUS at 13:20

## 2017-08-25 RX ADMIN — HYDROMORPHONE HYDROCHLORIDE 1 MILLIGRAM(S): 2 INJECTION INTRAMUSCULAR; INTRAVENOUS; SUBCUTANEOUS at 02:16

## 2017-08-25 RX ADMIN — HYDROMORPHONE HYDROCHLORIDE 0.5 MILLIGRAM(S): 2 INJECTION INTRAMUSCULAR; INTRAVENOUS; SUBCUTANEOUS at 13:26

## 2017-08-25 RX ADMIN — PANTOPRAZOLE SODIUM 40 MILLIGRAM(S): 20 TABLET, DELAYED RELEASE ORAL at 11:08

## 2017-08-25 RX ADMIN — NYSTATIN CREAM 1 APPLICATION(S): 100000 CREAM TOPICAL at 07:06

## 2017-08-25 RX ADMIN — NYSTATIN CREAM 1 APPLICATION(S): 100000 CREAM TOPICAL at 17:13

## 2017-08-25 RX ADMIN — HYDROMORPHONE HYDROCHLORIDE 1 MILLIGRAM(S): 2 INJECTION INTRAMUSCULAR; INTRAVENOUS; SUBCUTANEOUS at 02:30

## 2017-08-25 RX ADMIN — HYDROMORPHONE HYDROCHLORIDE 0.5 MILLIGRAM(S): 2 INJECTION INTRAMUSCULAR; INTRAVENOUS; SUBCUTANEOUS at 13:54

## 2017-08-25 RX ADMIN — HYDROMORPHONE HYDROCHLORIDE 1 MILLIGRAM(S): 2 INJECTION INTRAMUSCULAR; INTRAVENOUS; SUBCUTANEOUS at 16:59

## 2017-08-25 RX ADMIN — HYDROMORPHONE HYDROCHLORIDE 1 MILLIGRAM(S): 2 INJECTION INTRAMUSCULAR; INTRAVENOUS; SUBCUTANEOUS at 06:17

## 2017-08-25 RX ADMIN — HYDROMORPHONE HYDROCHLORIDE 1 MILLIGRAM(S): 2 INJECTION INTRAMUSCULAR; INTRAVENOUS; SUBCUTANEOUS at 06:40

## 2017-08-25 RX ADMIN — HEPARIN SODIUM 5000 UNIT(S): 5000 INJECTION INTRAVENOUS; SUBCUTANEOUS at 21:41

## 2017-08-25 RX ADMIN — Medication 25 MILLIGRAM(S): at 05:01

## 2017-08-25 RX ADMIN — HYDROMORPHONE HYDROCHLORIDE 1 MILLIGRAM(S): 2 INJECTION INTRAMUSCULAR; INTRAVENOUS; SUBCUTANEOUS at 11:08

## 2017-08-25 RX ADMIN — Medication 25 MILLIGRAM(S): at 00:39

## 2017-08-25 RX ADMIN — HYDROMORPHONE HYDROCHLORIDE 1 MILLIGRAM(S): 2 INJECTION INTRAMUSCULAR; INTRAVENOUS; SUBCUTANEOUS at 11:30

## 2017-08-25 RX ADMIN — HEPARIN SODIUM 5000 UNIT(S): 5000 INJECTION INTRAVENOUS; SUBCUTANEOUS at 06:17

## 2017-08-25 RX ADMIN — I.V. FAT EMULSION 20.83 GRAM(S): 20 EMULSION INTRAVENOUS at 21:41

## 2017-08-25 RX ADMIN — CALCITRIOL 1 MICROGRAM(S): 0.5 CAPSULE ORAL at 13:20

## 2017-08-25 NOTE — PROGRESS NOTE ADULT - SUBJECTIVE AND OBJECTIVE BOX
O/N: VSS, VAC functioning well.  8/25: transfered to the floor in stable condition O/N: VSS, VAC functioning well.  8/25: transfered to the floor in stable condition       SUBJECTIVE: Patient seen and examined bedside by chief resident.    heparin  Injectable 5000 Unit(s) SubCutaneous every 8 hours      Vital Signs Last 24 Hrs  T(C): 37.7 (25 Aug 2017 05:28), Max: 37.7 (24 Aug 2017 16:58)  T(F): 99.8 (25 Aug 2017 05:28), Max: 99.9 (24 Aug 2017 16:58)  HR: 72 (25 Aug 2017 05:38) (62 - 86)  BP: 147/74 (25 Aug 2017 05:38) (119/86 - 153/72)  BP(mean): 104 (25 Aug 2017 05:38) (83 - 142)  RR: 18 (25 Aug 2017 05:38) (15 - 23)  SpO2: 95% (25 Aug 2017 05:38) (94% - 98%)  I&O's Detail    24 Aug 2017 07:01  -  25 Aug 2017 07:00  --------------------------------------------------------  IN:    Fat Emulsion 20%: 208.2 mL    TPN (Total Parenteral Nutrition): 1344 mL  Total IN: 1552.2 mL    OUT:    Bulb: 145 mL    Bulb: 45 mL    Drain: 30 mL    VAC (Vacuum Assisted Closure) System: 225 mL    Voided: 1325 mL  Total OUT: 1770 mL    Total NET: -217.8 mL          General: NAD, resting comfortably in bed  Abd: soft, NT, vac in place       LABS:                        9.4    9.5   )-----------( 341      ( 25 Aug 2017 05:51 )             30.5     08-25    141  |  105  |  29<H>  ----------------------------<  121<H>  4.3   |  24  |  0.60    Ca    9.7      25 Aug 2017 05:52  Phos  3.7     08-25  Mg     2.1     08-25            RADIOLOGY & ADDITIONAL STUDIES:

## 2017-08-25 NOTE — PROGRESS NOTE ADULT - ASSESSMENT
57 F s/p  SBR resection, fistula resection, esophagojejunostomy revision w/ post-op course complicated by RTOR for distal anastomosis breakdown, ATN, acute respiratory failure, & septic shock.     Neuro: Dilaudid  PRN; ENT consult re: hearing loss   CV: MAP 65  Resp: Satting well on NC   GI/FEN: NPO, TPN/lipids, Vit B12, Protonix  : Voids  Endo: ISS calcitriol 2* vit d def.   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  Drains: LLQ CHECO. LUQ CHECO, Malecot in enterotomy site, wound vac to midline  Wounds: Midline xiphoid to pubis incision; DTI & stage 2 pressure ulcer noted. 57 F s/p  SBR resection, fistula resection, esophagojejunostomy revision w/ post-op course complicated by RTOR for distal anastomosis breakdown, ATN, acute respiratory failure, & septic shock.   pain control   DVT ppx   nystatin   NPO  CTM drain outputs   plan for VAC change today

## 2017-08-25 NOTE — CHART NOTE - NSCHARTNOTEFT_GEN_A_CORE
Admitting Diagnosis:   Patient is a 57y old  Female who presents with a chief complaint of enterocutaneous fistula (24 Jul 2017 14:15)      PAST MEDICAL & SURGICAL HISTORY:  Reflux  Obesity  DVT (deep venous thrombosis): 2013 neck  Diverticulitis  Hypertension  Elective surgery: abodominal wall surgery  dec 2016  Elective surgery: NOV 2016 gastric bypass revision  Gastric bypass status for obesity: gastric sleeve, 6/2012  S/P breast biopsy: 2011, left  S/P colon resection: 2011  S/P knee surgery: repair 2009, 2011  right; left  Umbilical hernia: 2000  S/P appendectomy: 1975  S/P tonsillectomy: 1967      Current Nutrition Order:   Diet, NPO:   TPN: Central Line: 254 g D, 104 g AA, 50 g lipids, & 1771 mL fluid. Providing pt with 1770 kcal, 104 g protein, & 1858 mL fluid. GIR = 1.8    GI Issues: Pt with no recent complaints of GI distress    Pain: pain is being managed     Skin Integrity: surgical incision, sacral DTI, sacral stage III       Labs:   08-25    141  |  105  |  29<H>  ----------------------------<  121<H>  4.3   |  24  |  0.60    Ca    9.7      25 Aug 2017 05:52  Phos  3.7     08-25  Mg     2.1     08-25    (8/21) Tg 203      CAPILLARY BLOOD GLUCOSE  124 (25 Aug 2017 11:33)  119 (25 Aug 2017 05:28)  128 (24 Aug 2017 16:58)          Medications:  MEDICATIONS  (STANDING):  insulin lispro (HumaLOG) corrective regimen sliding scale   SubCutaneous every 6 hours  dextrose 5%. 1000 milliLiter(s) (50 mL/Hr) IV Continuous <Continuous>  dextrose 50% Injectable 25 Gram(s) IV Push once  sodium chloride 0.9% lock flush 20 milliLiter(s) IV Push once  cyanocobalamin Injectable 1000 MICROGram(s) SubCutaneous every 7 days  dextrose 50% Injectable 12.5 Gram(s) IV Push once  nystatin Powder 1 Application(s) Topical two times a day  pantoprazole  Injectable 40 milliGRAM(s) IV Push daily  calcitriol Injectable 1 MICROGram(s) IV Push <User Schedule>  heparin  Injectable 5000 Unit(s) SubCutaneous every 8 hours  Parenteral Nutrition - Adult 1 Each (64 mL/Hr) TPN Continuous <Continuous>  Parenteral Nutrition - Adult 1 Each (64 mL/Hr) TPN Continuous <Continuous>  fat emulsion 20% Infusion 50 Gram(s) (20.83 mL/Hr) IV Continuous <Continuous>    MEDICATIONS  (PRN):  ondansetron Injectable 4 milliGRAM(s) IV Push every 6 hours PRN Nausea  sodium chloride 0.9% lock flush 10 milliLiter(s) IV Push every 1 hour PRN After each medication administration  sodium chloride 0.9% lock flush 10 milliLiter(s) IV Push every 12 hours PRN Lumen of catheter NOT used  diphenhydrAMINE   Injectable 25 milliGRAM(s) IV Push every 6 hours PRN Rash and/or Itching  HYDROmorphone  Injectable 1 milliGRAM(s) IV Push every 4 hours PRN Severe Pain (7 - 10)  HYDROmorphone  Injectable 0.5 milliGRAM(s) IV Push every 4 hours PRN Moderate Pain (4 - 6)      Weight: (8/1) 99.5kg    Weight Change: no new weights to assess    Estimated energy needs: using 52 kg IBW:  30-35 kcal/kg (4832-3878 kcal).   1.8-2 g/kg ( g protein).  30-35 mL/kg (3237-7512 mL fluid).     Subjective: Pt is s/p  SBR resection, fistula resection, esophagojejunostomy revision w/ post-op course complicated by RTOR for distal anastomosis breakdown, ATN, acute respiratory failure, & septic shock.   Recent stepped-down to 8LA.    Pt remains NPO with TPN infusing.     Previous Nutrition Diagnosis: Increased nutrient needs RT fistula & S/p surgery AEB 1.8-2 g/kg protein.     Active [  x ]  Resolved [   ]    Goal: Pt to consistently meet at least 75% estimated nutrient needs via tolerated/appropriate route     Recommendations: 1. Continue current TPN order. 2. Trial CLD as seen medically feasible     Education: Pt is understanding of current TPN order.  Further education not appropriate at this time.     Risk Level: High [ x  ] Moderate [   ] Low [   ]

## 2017-08-26 LAB
ANION GAP SERPL CALC-SCNC: 12 MMOL/L — SIGNIFICANT CHANGE UP (ref 5–17)
BUN SERPL-MCNC: 28 MG/DL — HIGH (ref 7–23)
CALCIUM SERPL-MCNC: 9.4 MG/DL — SIGNIFICANT CHANGE UP (ref 8.4–10.5)
CHLORIDE SERPL-SCNC: 102 MMOL/L — SIGNIFICANT CHANGE UP (ref 96–108)
CO2 SERPL-SCNC: 24 MMOL/L — SIGNIFICANT CHANGE UP (ref 22–31)
CREAT SERPL-MCNC: 0.6 MG/DL — SIGNIFICANT CHANGE UP (ref 0.5–1.3)
GLUCOSE SERPL-MCNC: 123 MG/DL — HIGH (ref 70–99)
HCT VFR BLD CALC: 29.8 % — LOW (ref 34.5–45)
HGB BLD-MCNC: 9.4 G/DL — LOW (ref 11.5–15.5)
MAGNESIUM SERPL-MCNC: 2 MG/DL — SIGNIFICANT CHANGE UP (ref 1.6–2.6)
MCHC RBC-ENTMCNC: 29.3 PG — SIGNIFICANT CHANGE UP (ref 27–34)
MCHC RBC-ENTMCNC: 31.5 G/DL — LOW (ref 32–36)
MCV RBC AUTO: 92.8 FL — SIGNIFICANT CHANGE UP (ref 80–100)
PHOSPHATE SERPL-MCNC: 3.8 MG/DL — SIGNIFICANT CHANGE UP (ref 2.5–4.5)
PLATELET # BLD AUTO: 318 K/UL — SIGNIFICANT CHANGE UP (ref 150–400)
POTASSIUM SERPL-MCNC: 4.2 MMOL/L — SIGNIFICANT CHANGE UP (ref 3.5–5.3)
POTASSIUM SERPL-SCNC: 4.2 MMOL/L — SIGNIFICANT CHANGE UP (ref 3.5–5.3)
RBC # BLD: 3.21 M/UL — LOW (ref 3.8–5.2)
RBC # FLD: 18.8 % — HIGH (ref 10.3–16.9)
SODIUM SERPL-SCNC: 138 MMOL/L — SIGNIFICANT CHANGE UP (ref 135–145)
WBC # BLD: 9.4 K/UL — SIGNIFICANT CHANGE UP (ref 3.8–10.5)
WBC # FLD AUTO: 9.4 K/UL — SIGNIFICANT CHANGE UP (ref 3.8–10.5)

## 2017-08-26 RX ORDER — ELECTROLYTE SOLUTION,INJ
1 VIAL (ML) INTRAVENOUS
Qty: 0 | Refills: 0 | Status: DISCONTINUED | OUTPATIENT
Start: 2017-08-26 | End: 2017-08-26

## 2017-08-26 RX ORDER — DIPHENHYDRAMINE HCL 50 MG
25 CAPSULE ORAL ONCE
Qty: 0 | Refills: 0 | Status: COMPLETED | OUTPATIENT
Start: 2017-08-26 | End: 2017-08-26

## 2017-08-26 RX ORDER — I.V. FAT EMULSION 20 G/100ML
50 EMULSION INTRAVENOUS
Qty: 0 | Refills: 0 | Status: DISCONTINUED | OUTPATIENT
Start: 2017-08-26 | End: 2017-08-26

## 2017-08-26 RX ORDER — ACETAMINOPHEN 500 MG
1000 TABLET ORAL ONCE
Qty: 0 | Refills: 0 | Status: COMPLETED | OUTPATIENT
Start: 2017-08-26 | End: 2017-08-26

## 2017-08-26 RX ORDER — HYDROMORPHONE HYDROCHLORIDE 2 MG/ML
0.5 INJECTION INTRAMUSCULAR; INTRAVENOUS; SUBCUTANEOUS ONCE
Qty: 0 | Refills: 0 | Status: DISCONTINUED | OUTPATIENT
Start: 2017-08-26 | End: 2017-08-27

## 2017-08-26 RX ADMIN — HYDROMORPHONE HYDROCHLORIDE 1 MILLIGRAM(S): 2 INJECTION INTRAMUSCULAR; INTRAVENOUS; SUBCUTANEOUS at 01:53

## 2017-08-26 RX ADMIN — HYDROMORPHONE HYDROCHLORIDE 1 MILLIGRAM(S): 2 INJECTION INTRAMUSCULAR; INTRAVENOUS; SUBCUTANEOUS at 02:10

## 2017-08-26 RX ADMIN — Medication 25 MILLIGRAM(S): at 21:46

## 2017-08-26 RX ADMIN — HYDROMORPHONE HYDROCHLORIDE 1 MILLIGRAM(S): 2 INJECTION INTRAMUSCULAR; INTRAVENOUS; SUBCUTANEOUS at 19:29

## 2017-08-26 RX ADMIN — HYDROMORPHONE HYDROCHLORIDE 1 MILLIGRAM(S): 2 INJECTION INTRAMUSCULAR; INTRAVENOUS; SUBCUTANEOUS at 07:15

## 2017-08-26 RX ADMIN — HYDROMORPHONE HYDROCHLORIDE 0.5 MILLIGRAM(S): 2 INJECTION INTRAMUSCULAR; INTRAVENOUS; SUBCUTANEOUS at 00:25

## 2017-08-26 RX ADMIN — Medication 400 MILLIGRAM(S): at 22:48

## 2017-08-26 RX ADMIN — HYDROMORPHONE HYDROCHLORIDE 0.5 MILLIGRAM(S): 2 INJECTION INTRAMUSCULAR; INTRAVENOUS; SUBCUTANEOUS at 00:45

## 2017-08-26 RX ADMIN — HYDROMORPHONE HYDROCHLORIDE 1 MILLIGRAM(S): 2 INJECTION INTRAMUSCULAR; INTRAVENOUS; SUBCUTANEOUS at 07:01

## 2017-08-26 RX ADMIN — NYSTATIN CREAM 1 APPLICATION(S): 100000 CREAM TOPICAL at 17:27

## 2017-08-26 RX ADMIN — PANTOPRAZOLE SODIUM 40 MILLIGRAM(S): 20 TABLET, DELAYED RELEASE ORAL at 12:02

## 2017-08-26 RX ADMIN — Medication 25 MILLIGRAM(S): at 23:51

## 2017-08-26 RX ADMIN — Medication 1000 MILLIGRAM(S): at 23:56

## 2017-08-26 RX ADMIN — I.V. FAT EMULSION 20.8 GRAM(S): 20 EMULSION INTRAVENOUS at 22:01

## 2017-08-26 RX ADMIN — HEPARIN SODIUM 5000 UNIT(S): 5000 INJECTION INTRAVENOUS; SUBCUTANEOUS at 21:46

## 2017-08-26 RX ADMIN — HYDROMORPHONE HYDROCHLORIDE 0.5 MILLIGRAM(S): 2 INJECTION INTRAMUSCULAR; INTRAVENOUS; SUBCUTANEOUS at 05:00

## 2017-08-26 RX ADMIN — HYDROMORPHONE HYDROCHLORIDE 0.5 MILLIGRAM(S): 2 INJECTION INTRAMUSCULAR; INTRAVENOUS; SUBCUTANEOUS at 22:00

## 2017-08-26 RX ADMIN — HYDROMORPHONE HYDROCHLORIDE 0.5 MILLIGRAM(S): 2 INJECTION INTRAMUSCULAR; INTRAVENOUS; SUBCUTANEOUS at 15:20

## 2017-08-26 RX ADMIN — HYDROMORPHONE HYDROCHLORIDE 0.5 MILLIGRAM(S): 2 INJECTION INTRAMUSCULAR; INTRAVENOUS; SUBCUTANEOUS at 15:50

## 2017-08-26 RX ADMIN — NYSTATIN CREAM 1 APPLICATION(S): 100000 CREAM TOPICAL at 06:43

## 2017-08-26 RX ADMIN — Medication 25 MILLIGRAM(S): at 05:16

## 2017-08-26 RX ADMIN — Medication 25 MILLIGRAM(S): at 00:25

## 2017-08-26 RX ADMIN — HYDROMORPHONE HYDROCHLORIDE 0.5 MILLIGRAM(S): 2 INJECTION INTRAMUSCULAR; INTRAVENOUS; SUBCUTANEOUS at 04:33

## 2017-08-26 RX ADMIN — Medication 10 MILLIGRAM(S): at 12:02

## 2017-08-26 RX ADMIN — Medication 25 MILLIGRAM(S): at 15:21

## 2017-08-26 RX ADMIN — Medication 400 MILLIGRAM(S): at 17:05

## 2017-08-26 RX ADMIN — HEPARIN SODIUM 5000 UNIT(S): 5000 INJECTION INTRAVENOUS; SUBCUTANEOUS at 15:20

## 2017-08-26 RX ADMIN — Medication 1 EACH: at 17:05

## 2017-08-26 RX ADMIN — HYDROMORPHONE HYDROCHLORIDE 1 MILLIGRAM(S): 2 INJECTION INTRAMUSCULAR; INTRAVENOUS; SUBCUTANEOUS at 12:30

## 2017-08-26 RX ADMIN — Medication 25 MILLIGRAM(S): at 09:03

## 2017-08-26 RX ADMIN — HEPARIN SODIUM 5000 UNIT(S): 5000 INJECTION INTRAVENOUS; SUBCUTANEOUS at 06:42

## 2017-08-26 RX ADMIN — HYDROMORPHONE HYDROCHLORIDE 1 MILLIGRAM(S): 2 INJECTION INTRAMUSCULAR; INTRAVENOUS; SUBCUTANEOUS at 12:01

## 2017-08-26 RX ADMIN — HYDROMORPHONE HYDROCHLORIDE 0.5 MILLIGRAM(S): 2 INJECTION INTRAMUSCULAR; INTRAVENOUS; SUBCUTANEOUS at 21:46

## 2017-08-26 RX ADMIN — HYDROMORPHONE HYDROCHLORIDE 1 MILLIGRAM(S): 2 INJECTION INTRAMUSCULAR; INTRAVENOUS; SUBCUTANEOUS at 20:01

## 2017-08-26 NOTE — PROGRESS NOTE ADULT - SUBJECTIVE AND OBJECTIVE BOX
O/N: katya diet, VAC functioning well. VSS  8/25: vac changed, advanced to CLD O/N: katya diet, VAC functioning well. VSS  8/25: vac changed, advanced to CLD     STATUS POST:  POD19 ex lap, ab washout, removal of mesh     SUBJECTIVE: Patient seen and examined bedside by chief resident. Resting comfortably.    heparin  Injectable 5000 Unit(s) SubCutaneous every 8 hours      Vital Signs Last 24 Hrs  T(C): 37.4 (26 Aug 2017 05:42), Max: 38.1 (25 Aug 2017 14:11)  T(F): 99.3 (26 Aug 2017 05:42), Max: 100.6 (25 Aug 2017 14:11)  HR: 74 (26 Aug 2017 05:49) (70 - 100)  BP: 129/77 (26 Aug 2017 05:49) (125/75 - 144/75)  BP(mean): 97 (26 Aug 2017 05:49) (95 - 109)  RR: 16 (26 Aug 2017 05:49) (16 - 18)  SpO2: 97% (26 Aug 2017 05:49) (89% - 97%)  I&O's Detail    25 Aug 2017 07:01  -  26 Aug 2017 07:00  --------------------------------------------------------  IN:    Fat Emulsion 20%: 249.6 mL    TPN (Total Parenteral Nutrition): 1472 mL  Total IN: 1721.6 mL    OUT:    Bulb: 210 mL    Bulb: 160 mL    Drain: 105 mL    VAC (Vacuum Assisted Closure) System: 575 mL    Voided: 800 mL  Total OUT: 1850 mL    Total NET: -128.4 mL          General: NAD, resting comfortably in bed  C/V: NSR  Pulm: Nonlabored breathing, no respiratory distress  Abd: soft, nontender, slightly distended. Ab VAC in place (changed on Friday).  Extrem: WWP, no edema, SCDs in place        LABS:                        9.4    9.4   )-----------( 318      ( 26 Aug 2017 05:52 )             29.8     08-26    138  |  102  |  28<H>  ----------------------------<  123<H>  4.2   |  24  |  0.60    Ca    9.4      26 Aug 2017 05:52  Phos  3.8     08-26  Mg     2.0     08-26            RADIOLOGY & ADDITIONAL STUDIES:

## 2017-08-26 NOTE — PROGRESS NOTE ADULT - ASSESSMENT
57 F s/p  SBR resection, fistula resection, esophagojejunostomy revision w/ post-op course complicated by RTOR for distal anastomosis breakdown, ATN, acute respiratory failure, & septic shock.     Neuro: Dilaudid  PRN; ENT consult re: hearing loss   CV: MAP 65  Resp: Satting well on RA  GI/FEN: CLD, TPN/lipids, Vit B12, Protonix  : Voids  Endo: ISS calcitriol 2* vit d def.   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  Drains: LLQ CHECO. LUQ CHECO, Malecot in enterotomy site, wound vac to midline  Wounds: Midline xiphoid to pubis incision; DTI & stage 2 pressure ulcer noted.

## 2017-08-27 LAB
ANION GAP SERPL CALC-SCNC: 14 MMOL/L — SIGNIFICANT CHANGE UP (ref 5–17)
BUN SERPL-MCNC: 29 MG/DL — HIGH (ref 7–23)
CALCIUM SERPL-MCNC: 9.6 MG/DL — SIGNIFICANT CHANGE UP (ref 8.4–10.5)
CHLORIDE SERPL-SCNC: 98 MMOL/L — SIGNIFICANT CHANGE UP (ref 96–108)
CO2 SERPL-SCNC: 24 MMOL/L — SIGNIFICANT CHANGE UP (ref 22–31)
CREAT SERPL-MCNC: 0.6 MG/DL — SIGNIFICANT CHANGE UP (ref 0.5–1.3)
GLUCOSE SERPL-MCNC: 133 MG/DL — HIGH (ref 70–99)
HCT VFR BLD CALC: 30.7 % — LOW (ref 34.5–45)
HGB BLD-MCNC: 9.8 G/DL — LOW (ref 11.5–15.5)
MAGNESIUM SERPL-MCNC: 2.2 MG/DL — SIGNIFICANT CHANGE UP (ref 1.6–2.6)
MCHC RBC-ENTMCNC: 29.5 PG — SIGNIFICANT CHANGE UP (ref 27–34)
MCHC RBC-ENTMCNC: 31.9 G/DL — LOW (ref 32–36)
MCV RBC AUTO: 92.5 FL — SIGNIFICANT CHANGE UP (ref 80–100)
PHOSPHATE SERPL-MCNC: 4.1 MG/DL — SIGNIFICANT CHANGE UP (ref 2.5–4.5)
PLATELET # BLD AUTO: 320 K/UL — SIGNIFICANT CHANGE UP (ref 150–400)
POTASSIUM SERPL-MCNC: 4.7 MMOL/L — SIGNIFICANT CHANGE UP (ref 3.5–5.3)
POTASSIUM SERPL-SCNC: 4.7 MMOL/L — SIGNIFICANT CHANGE UP (ref 3.5–5.3)
RBC # BLD: 3.32 M/UL — LOW (ref 3.8–5.2)
RBC # FLD: 19 % — HIGH (ref 10.3–16.9)
SODIUM SERPL-SCNC: 136 MMOL/L — SIGNIFICANT CHANGE UP (ref 135–145)
WBC # BLD: 10.4 K/UL — SIGNIFICANT CHANGE UP (ref 3.8–10.5)
WBC # FLD AUTO: 10.4 K/UL — SIGNIFICANT CHANGE UP (ref 3.8–10.5)

## 2017-08-27 RX ORDER — ELECTROLYTE SOLUTION,INJ
1 VIAL (ML) INTRAVENOUS
Qty: 0 | Refills: 0 | Status: DISCONTINUED | OUTPATIENT
Start: 2017-08-27 | End: 2017-08-27

## 2017-08-27 RX ORDER — DIAZEPAM 5 MG
2 TABLET ORAL AT BEDTIME
Qty: 0 | Refills: 0 | Status: DISCONTINUED | OUTPATIENT
Start: 2017-08-27 | End: 2017-08-27

## 2017-08-27 RX ORDER — I.V. FAT EMULSION 20 G/100ML
50 EMULSION INTRAVENOUS
Qty: 0 | Refills: 0 | Status: DISCONTINUED | OUTPATIENT
Start: 2017-08-27 | End: 2017-08-27

## 2017-08-27 RX ORDER — HYDROMORPHONE HYDROCHLORIDE 2 MG/ML
0.5 INJECTION INTRAMUSCULAR; INTRAVENOUS; SUBCUTANEOUS ONCE
Qty: 0 | Refills: 0 | Status: DISCONTINUED | OUTPATIENT
Start: 2017-08-27 | End: 2017-08-29

## 2017-08-27 RX ORDER — DIAZEPAM 5 MG
2 TABLET ORAL ONCE
Qty: 0 | Refills: 0 | Status: DISCONTINUED | OUTPATIENT
Start: 2017-08-27 | End: 2017-08-27

## 2017-08-27 RX ORDER — HYDROMORPHONE HYDROCHLORIDE 2 MG/ML
1 INJECTION INTRAMUSCULAR; INTRAVENOUS; SUBCUTANEOUS ONCE
Qty: 0 | Refills: 0 | Status: DISCONTINUED | OUTPATIENT
Start: 2017-08-27 | End: 2017-08-29

## 2017-08-27 RX ORDER — HYDROMORPHONE HYDROCHLORIDE 2 MG/ML
1 INJECTION INTRAMUSCULAR; INTRAVENOUS; SUBCUTANEOUS ONCE
Qty: 0 | Refills: 0 | Status: DISCONTINUED | OUTPATIENT
Start: 2017-08-27 | End: 2017-08-27

## 2017-08-27 RX ADMIN — HYDROMORPHONE HYDROCHLORIDE 0.5 MILLIGRAM(S): 2 INJECTION INTRAMUSCULAR; INTRAVENOUS; SUBCUTANEOUS at 21:30

## 2017-08-27 RX ADMIN — HEPARIN SODIUM 5000 UNIT(S): 5000 INJECTION INTRAVENOUS; SUBCUTANEOUS at 13:40

## 2017-08-27 RX ADMIN — PANTOPRAZOLE SODIUM 40 MILLIGRAM(S): 20 TABLET, DELAYED RELEASE ORAL at 12:05

## 2017-08-27 RX ADMIN — HYDROMORPHONE HYDROCHLORIDE 1 MILLIGRAM(S): 2 INJECTION INTRAMUSCULAR; INTRAVENOUS; SUBCUTANEOUS at 06:27

## 2017-08-27 RX ADMIN — HYDROMORPHONE HYDROCHLORIDE 1 MILLIGRAM(S): 2 INJECTION INTRAMUSCULAR; INTRAVENOUS; SUBCUTANEOUS at 16:21

## 2017-08-27 RX ADMIN — Medication 2 MILLIGRAM(S): at 22:50

## 2017-08-27 RX ADMIN — Medication 10 MILLIGRAM(S): at 12:05

## 2017-08-27 RX ADMIN — HYDROMORPHONE HYDROCHLORIDE 1 MILLIGRAM(S): 2 INJECTION INTRAMUSCULAR; INTRAVENOUS; SUBCUTANEOUS at 07:00

## 2017-08-27 RX ADMIN — I.V. FAT EMULSION 20.8 GRAM(S): 20 EMULSION INTRAVENOUS at 22:01

## 2017-08-27 RX ADMIN — Medication 25 MILLIGRAM(S): at 17:15

## 2017-08-27 RX ADMIN — HEPARIN SODIUM 5000 UNIT(S): 5000 INJECTION INTRAVENOUS; SUBCUTANEOUS at 06:30

## 2017-08-27 RX ADMIN — HYDROMORPHONE HYDROCHLORIDE 0.5 MILLIGRAM(S): 2 INJECTION INTRAMUSCULAR; INTRAVENOUS; SUBCUTANEOUS at 22:09

## 2017-08-27 RX ADMIN — NYSTATIN CREAM 1 APPLICATION(S): 100000 CREAM TOPICAL at 17:15

## 2017-08-27 RX ADMIN — HYDROMORPHONE HYDROCHLORIDE 1 MILLIGRAM(S): 2 INJECTION INTRAMUSCULAR; INTRAVENOUS; SUBCUTANEOUS at 09:15

## 2017-08-27 RX ADMIN — Medication 25 MILLIGRAM(S): at 10:11

## 2017-08-27 RX ADMIN — HYDROMORPHONE HYDROCHLORIDE 1 MILLIGRAM(S): 2 INJECTION INTRAMUSCULAR; INTRAVENOUS; SUBCUTANEOUS at 19:48

## 2017-08-27 RX ADMIN — Medication 1 EACH: at 17:15

## 2017-08-27 RX ADMIN — HYDROMORPHONE HYDROCHLORIDE 0.5 MILLIGRAM(S): 2 INJECTION INTRAMUSCULAR; INTRAVENOUS; SUBCUTANEOUS at 12:51

## 2017-08-27 RX ADMIN — Medication 25 MILLIGRAM(S): at 03:49

## 2017-08-27 RX ADMIN — HEPARIN SODIUM 5000 UNIT(S): 5000 INJECTION INTRAVENOUS; SUBCUTANEOUS at 21:30

## 2017-08-27 RX ADMIN — HYDROMORPHONE HYDROCHLORIDE 1 MILLIGRAM(S): 2 INJECTION INTRAMUSCULAR; INTRAVENOUS; SUBCUTANEOUS at 15:38

## 2017-08-27 RX ADMIN — HYDROMORPHONE HYDROCHLORIDE 1 MILLIGRAM(S): 2 INJECTION INTRAMUSCULAR; INTRAVENOUS; SUBCUTANEOUS at 20:30

## 2017-08-27 RX ADMIN — NYSTATIN CREAM 1 APPLICATION(S): 100000 CREAM TOPICAL at 06:27

## 2017-08-27 RX ADMIN — HYDROMORPHONE HYDROCHLORIDE 1 MILLIGRAM(S): 2 INJECTION INTRAMUSCULAR; INTRAVENOUS; SUBCUTANEOUS at 02:41

## 2017-08-27 RX ADMIN — Medication 2 MILLIGRAM(S): at 01:13

## 2017-08-27 RX ADMIN — HYDROMORPHONE HYDROCHLORIDE 0.5 MILLIGRAM(S): 2 INJECTION INTRAMUSCULAR; INTRAVENOUS; SUBCUTANEOUS at 00:30

## 2017-08-27 RX ADMIN — HYDROMORPHONE HYDROCHLORIDE 0.5 MILLIGRAM(S): 2 INJECTION INTRAMUSCULAR; INTRAVENOUS; SUBCUTANEOUS at 13:26

## 2017-08-27 RX ADMIN — HYDROMORPHONE HYDROCHLORIDE 1 MILLIGRAM(S): 2 INJECTION INTRAMUSCULAR; INTRAVENOUS; SUBCUTANEOUS at 08:54

## 2017-08-27 RX ADMIN — HYDROMORPHONE HYDROCHLORIDE 0.5 MILLIGRAM(S): 2 INJECTION INTRAMUSCULAR; INTRAVENOUS; SUBCUTANEOUS at 00:15

## 2017-08-27 RX ADMIN — HYDROMORPHONE HYDROCHLORIDE 1 MILLIGRAM(S): 2 INJECTION INTRAMUSCULAR; INTRAVENOUS; SUBCUTANEOUS at 03:00

## 2017-08-27 NOTE — PROGRESS NOTE ADULT - SUBJECTIVE AND OBJECTIVE BOX
O/N: No acute events  8/26: continuing to tolerate diet. 50 ml/hr output from VAC. VSS, no acute events        SUBJECTIVE:  Flatus: [ ] YES [x ] NO             Bowel Movement: [ ] YES [x ] NO  Pain (0-10):            Pain Control Adequate: [x ] YES [ ] NO  Nausea: [ ] YES [x ] NO            Vomiting: [ ] YES [x ] NO  Diarrhea: [ ] YES [ x] NO         Constipation: [ ] YES [x ] NO     Chest Pain: [ ] YES [ x] NO    SOB:  [ ] YES [ x] NO    MEDICATIONS  (STANDING):  insulin lispro (HumaLOG) corrective regimen sliding scale   SubCutaneous every 6 hours  dextrose 5%. 1000 milliLiter(s) (50 mL/Hr) IV Continuous <Continuous>  dextrose 50% Injectable 25 Gram(s) IV Push once  sodium chloride 0.9% lock flush 20 milliLiter(s) IV Push once  cyanocobalamin Injectable 1000 MICROGram(s) SubCutaneous every 7 days  dextrose 50% Injectable 12.5 Gram(s) IV Push once  nystatin Powder 1 Application(s) Topical two times a day  pantoprazole  Injectable 40 milliGRAM(s) IV Push daily  calcitriol Injectable 1 MICROGram(s) IV Push <User Schedule>  heparin  Injectable 5000 Unit(s) SubCutaneous every 8 hours  bisacodyl Suppository 10 milliGRAM(s) Rectal daily  Parenteral Nutrition - Adult 1 Each (64 mL/Hr) TPN Continuous <Continuous>    MEDICATIONS  (PRN):  ondansetron Injectable 4 milliGRAM(s) IV Push every 6 hours PRN Nausea  sodium chloride 0.9% lock flush 10 milliLiter(s) IV Push every 1 hour PRN After each medication administration  sodium chloride 0.9% lock flush 10 milliLiter(s) IV Push every 12 hours PRN Lumen of catheter NOT used  diphenhydrAMINE   Injectable 25 milliGRAM(s) IV Push every 6 hours PRN Rash and/or Itching  HYDROmorphone  Injectable 1 milliGRAM(s) IV Push every 4 hours PRN Severe Pain (7 - 10)  HYDROmorphone  Injectable 0.5 milliGRAM(s) IV Push every 4 hours PRN Moderate Pain (4 - 6)      Vital Signs Last 24 Hrs  T(C): 37.6 (27 Aug 2017 05:11), Max: 38.8 (26 Aug 2017 14:14)  T(F): 99.6 (27 Aug 2017 05:11), Max: 101.9 (26 Aug 2017 17:29)  HR: 92 (27 Aug 2017 06:16) (80 - 92)  BP: 144/78 (27 Aug 2017 06:16) (140/78 - 150/84)  BP(mean): 105 (27 Aug 2017 06:16) (103 - 109)  RR: 16 (27 Aug 2017 06:16) (16 - 18)  SpO2: 94% (27 Aug 2017 06:16) (92% - 97%)    PHYSICAL EXAM:      Constitutional: A&Ox3      Respiratory: non labored breathing, no respiratory distress    Cardiovascular: NSR, RRR    Gastrointestinal: Softly Distended, mild tenderness, VAC in midline abd.                    Genitourinary: voiding    Extremities: (-) edema                  I&O's Detail    26 Aug 2017 07:01  -  27 Aug 2017 07:00  --------------------------------------------------------  IN:    Fat Emulsion 20%: 208 mL    IV PiggyBack: 200 mL    TPN (Total Parenteral Nutrition): 1536 mL  Total IN: 1944 mL    OUT:    Bulb: 355 mL    Bulb: 270 mL    Drain: 375 mL    VAC (Vacuum Assisted Closure) System: 675 mL    Voided: 400 mL  Total OUT: 2075 mL    Total NET: -131 mL          LABS:                        9.8    10.4  )-----------( 320      ( 27 Aug 2017 06:24 )             30.7     08-26    138  |  102  |  28<H>  ----------------------------<  123<H>  4.2   |  24  |  0.60    Ca    9.4      26 Aug 2017 05:52  Phos  3.8     08-26  Mg     2.0     08-26            RADIOLOGY & ADDITIONAL STUDIES:

## 2017-08-28 LAB
ALBUMIN SERPL ELPH-MCNC: 2.8 G/DL — LOW (ref 3.3–5)
ALP SERPL-CCNC: 148 U/L — HIGH (ref 40–120)
ALT FLD-CCNC: 12 U/L — SIGNIFICANT CHANGE UP (ref 10–45)
ANION GAP SERPL CALC-SCNC: 13 MMOL/L — SIGNIFICANT CHANGE UP (ref 5–17)
ANION GAP SERPL CALC-SCNC: 13 MMOL/L — SIGNIFICANT CHANGE UP (ref 5–17)
AST SERPL-CCNC: 11 U/L — SIGNIFICANT CHANGE UP (ref 10–40)
BILIRUB SERPL-MCNC: 1.4 MG/DL — HIGH (ref 0.2–1.2)
BUN SERPL-MCNC: 27 MG/DL — HIGH (ref 7–23)
BUN SERPL-MCNC: 29 MG/DL — HIGH (ref 7–23)
CALCIUM SERPL-MCNC: 9.3 MG/DL — SIGNIFICANT CHANGE UP (ref 8.4–10.5)
CALCIUM SERPL-MCNC: 9.4 MG/DL — SIGNIFICANT CHANGE UP (ref 8.4–10.5)
CHLORIDE SERPL-SCNC: 100 MMOL/L — SIGNIFICANT CHANGE UP (ref 96–108)
CHLORIDE SERPL-SCNC: 99 MMOL/L — SIGNIFICANT CHANGE UP (ref 96–108)
CO2 SERPL-SCNC: 23 MMOL/L — SIGNIFICANT CHANGE UP (ref 22–31)
CO2 SERPL-SCNC: 23 MMOL/L — SIGNIFICANT CHANGE UP (ref 22–31)
CREAT SERPL-MCNC: 0.5 MG/DL — SIGNIFICANT CHANGE UP (ref 0.5–1.3)
CREAT SERPL-MCNC: 0.6 MG/DL — SIGNIFICANT CHANGE UP (ref 0.5–1.3)
GLUCOSE SERPL-MCNC: 127 MG/DL — HIGH (ref 70–99)
GLUCOSE SERPL-MCNC: 132 MG/DL — HIGH (ref 70–99)
HCT VFR BLD CALC: 29.9 % — LOW (ref 34.5–45)
HGB BLD-MCNC: 9.2 G/DL — LOW (ref 11.5–15.5)
MAGNESIUM SERPL-MCNC: 2.2 MG/DL — SIGNIFICANT CHANGE UP (ref 1.6–2.6)
MCHC RBC-ENTMCNC: 28.9 PG — SIGNIFICANT CHANGE UP (ref 27–34)
MCHC RBC-ENTMCNC: 30.8 G/DL — LOW (ref 32–36)
MCV RBC AUTO: 94 FL — SIGNIFICANT CHANGE UP (ref 80–100)
PHOSPHATE SERPL-MCNC: 3.2 MG/DL — SIGNIFICANT CHANGE UP (ref 2.5–4.5)
PLATELET # BLD AUTO: 284 K/UL — SIGNIFICANT CHANGE UP (ref 150–400)
POTASSIUM SERPL-MCNC: 4.3 MMOL/L — SIGNIFICANT CHANGE UP (ref 3.5–5.3)
POTASSIUM SERPL-MCNC: 4.4 MMOL/L — SIGNIFICANT CHANGE UP (ref 3.5–5.3)
POTASSIUM SERPL-SCNC: 4.3 MMOL/L — SIGNIFICANT CHANGE UP (ref 3.5–5.3)
POTASSIUM SERPL-SCNC: 4.4 MMOL/L — SIGNIFICANT CHANGE UP (ref 3.5–5.3)
PROT SERPL-MCNC: 7.3 G/DL — SIGNIFICANT CHANGE UP (ref 6–8.3)
RBC # BLD: 3.18 M/UL — LOW (ref 3.8–5.2)
RBC # FLD: 18.4 % — HIGH (ref 10.3–16.9)
SODIUM SERPL-SCNC: 135 MMOL/L — SIGNIFICANT CHANGE UP (ref 135–145)
SODIUM SERPL-SCNC: 136 MMOL/L — SIGNIFICANT CHANGE UP (ref 135–145)
WBC # BLD: 9 K/UL — SIGNIFICANT CHANGE UP (ref 3.8–10.5)
WBC # FLD AUTO: 9 K/UL — SIGNIFICANT CHANGE UP (ref 3.8–10.5)

## 2017-08-28 RX ORDER — DIAZEPAM 5 MG
2 TABLET ORAL AT BEDTIME
Qty: 0 | Refills: 0 | Status: DISCONTINUED | OUTPATIENT
Start: 2017-08-28 | End: 2017-09-04

## 2017-08-28 RX ORDER — DIPHENHYDRAMINE HCL 50 MG
25 CAPSULE ORAL ONCE
Qty: 0 | Refills: 0 | Status: COMPLETED | OUTPATIENT
Start: 2017-08-28 | End: 2017-08-28

## 2017-08-28 RX ORDER — NEOMYCIN SULFATE 500 MG/1
500 TABLET ORAL DAILY
Qty: 0 | Refills: 0 | Status: DISCONTINUED | OUTPATIENT
Start: 2017-08-28 | End: 2017-08-30

## 2017-08-28 RX ORDER — I.V. FAT EMULSION 20 G/100ML
50 EMULSION INTRAVENOUS ONCE
Qty: 0 | Refills: 0 | Status: COMPLETED | OUTPATIENT
Start: 2017-08-28 | End: 2017-08-28

## 2017-08-28 RX ORDER — DIPHENHYDRAMINE HCL 50 MG
50 CAPSULE ORAL EVERY 4 HOURS
Qty: 0 | Refills: 0 | Status: DISCONTINUED | OUTPATIENT
Start: 2017-08-28 | End: 2017-08-28

## 2017-08-28 RX ORDER — ELECTROLYTE SOLUTION,INJ
1 VIAL (ML) INTRAVENOUS
Qty: 0 | Refills: 0 | Status: DISCONTINUED | OUTPATIENT
Start: 2017-08-28 | End: 2017-08-28

## 2017-08-28 RX ORDER — NEOMYCIN SULFATE 500 MG/1
500 TABLET ORAL DAILY
Qty: 0 | Refills: 0 | Status: DISCONTINUED | OUTPATIENT
Start: 2017-08-28 | End: 2017-08-28

## 2017-08-28 RX ADMIN — Medication 25 MILLIGRAM(S): at 11:45

## 2017-08-28 RX ADMIN — HYDROMORPHONE HYDROCHLORIDE 1 MILLIGRAM(S): 2 INJECTION INTRAMUSCULAR; INTRAVENOUS; SUBCUTANEOUS at 17:35

## 2017-08-28 RX ADMIN — Medication 25 MILLIGRAM(S): at 15:43

## 2017-08-28 RX ADMIN — Medication 25 MILLIGRAM(S): at 19:38

## 2017-08-28 RX ADMIN — HYDROMORPHONE HYDROCHLORIDE 1 MILLIGRAM(S): 2 INJECTION INTRAMUSCULAR; INTRAVENOUS; SUBCUTANEOUS at 13:37

## 2017-08-28 RX ADMIN — HEPARIN SODIUM 5000 UNIT(S): 5000 INJECTION INTRAVENOUS; SUBCUTANEOUS at 13:37

## 2017-08-28 RX ADMIN — PANTOPRAZOLE SODIUM 40 MILLIGRAM(S): 20 TABLET, DELAYED RELEASE ORAL at 11:45

## 2017-08-28 RX ADMIN — HEPARIN SODIUM 5000 UNIT(S): 5000 INJECTION INTRAVENOUS; SUBCUTANEOUS at 21:45

## 2017-08-28 RX ADMIN — Medication 2 MILLIGRAM(S): at 22:53

## 2017-08-28 RX ADMIN — Medication 10 MILLIGRAM(S): at 11:48

## 2017-08-28 RX ADMIN — HYDROMORPHONE HYDROCHLORIDE 1 MILLIGRAM(S): 2 INJECTION INTRAMUSCULAR; INTRAVENOUS; SUBCUTANEOUS at 22:00

## 2017-08-28 RX ADMIN — HEPARIN SODIUM 5000 UNIT(S): 5000 INJECTION INTRAVENOUS; SUBCUTANEOUS at 05:18

## 2017-08-28 RX ADMIN — NEOMYCIN SULFATE 500 MILLIGRAM(S): 500 TABLET ORAL at 13:37

## 2017-08-28 RX ADMIN — NYSTATIN CREAM 1 APPLICATION(S): 100000 CREAM TOPICAL at 05:18

## 2017-08-28 RX ADMIN — HYDROMORPHONE HYDROCHLORIDE 1 MILLIGRAM(S): 2 INJECTION INTRAMUSCULAR; INTRAVENOUS; SUBCUTANEOUS at 05:35

## 2017-08-28 RX ADMIN — HYDROMORPHONE HYDROCHLORIDE 1 MILLIGRAM(S): 2 INJECTION INTRAMUSCULAR; INTRAVENOUS; SUBCUTANEOUS at 21:45

## 2017-08-28 RX ADMIN — Medication 25 MILLIGRAM(S): at 00:09

## 2017-08-28 RX ADMIN — HYDROMORPHONE HYDROCHLORIDE 1 MILLIGRAM(S): 2 INJECTION INTRAMUSCULAR; INTRAVENOUS; SUBCUTANEOUS at 01:10

## 2017-08-28 RX ADMIN — I.V. FAT EMULSION 20.83 GRAM(S): 20 EMULSION INTRAVENOUS at 21:48

## 2017-08-28 RX ADMIN — Medication 25 MILLIGRAM(S): at 01:55

## 2017-08-28 RX ADMIN — HYDROMORPHONE HYDROCHLORIDE 1 MILLIGRAM(S): 2 INJECTION INTRAMUSCULAR; INTRAVENOUS; SUBCUTANEOUS at 09:45

## 2017-08-28 RX ADMIN — Medication 25 MILLIGRAM(S): at 06:33

## 2017-08-28 RX ADMIN — NYSTATIN CREAM 1 APPLICATION(S): 100000 CREAM TOPICAL at 19:10

## 2017-08-28 RX ADMIN — HYDROMORPHONE HYDROCHLORIDE 1 MILLIGRAM(S): 2 INJECTION INTRAMUSCULAR; INTRAVENOUS; SUBCUTANEOUS at 05:18

## 2017-08-28 RX ADMIN — CALCITRIOL 1 MICROGRAM(S): 0.5 CAPSULE ORAL at 11:41

## 2017-08-28 RX ADMIN — HYDROMORPHONE HYDROCHLORIDE 1 MILLIGRAM(S): 2 INJECTION INTRAMUSCULAR; INTRAVENOUS; SUBCUTANEOUS at 13:55

## 2017-08-28 RX ADMIN — Medication 1 EACH: at 19:10

## 2017-08-28 RX ADMIN — HYDROMORPHONE HYDROCHLORIDE 1 MILLIGRAM(S): 2 INJECTION INTRAMUSCULAR; INTRAVENOUS; SUBCUTANEOUS at 00:53

## 2017-08-28 RX ADMIN — HYDROMORPHONE HYDROCHLORIDE 1 MILLIGRAM(S): 2 INJECTION INTRAMUSCULAR; INTRAVENOUS; SUBCUTANEOUS at 17:50

## 2017-08-28 RX ADMIN — HYDROMORPHONE HYDROCHLORIDE 1 MILLIGRAM(S): 2 INJECTION INTRAMUSCULAR; INTRAVENOUS; SUBCUTANEOUS at 09:28

## 2017-08-28 NOTE — PROGRESS NOTE ADULT - ASSESSMENT
57 F s/p  SBR, fistula resection, esophagojejunostomy revision w/ post-op course complicated by RTOR for distal anastomosis breakdown, ATN, acute respiratory failure, & septic shock.   Neuro: Dilaudid  PRN; ENT consult re: hearing loss   CV: MAP 65  Resp: Satting well on RA  GI/FEN: CLD, TPN/lipids, Vit B12, Protonix  : Voids, hold lasix 2/2 hearing loss  Endo: ISS calcitriol 2* vit d def.   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  Drains: LLQ CHECO. LUQ CHECO, Malecot in enterotomy site, wound vac to midline  Wounds: Midline xiphoid to pubis incision; DTI & stage 2 pressure ulcer noted. 57 F s/p  SBR, fistula resection, esophagojejunostomy revision w/ post-op course complicated by RTOR for distal anastomosis breakdown, ATN, acute respiratory failure, & septic shock.   pain control  DVT ppx   nystatin powder  TPN   CLD   CTM drain output   Next VAC change Wends (was changed 8/27)   Benadryl PRN   Can have valium PRN at night

## 2017-08-28 NOTE — PROGRESS NOTE ADULT - SUBJECTIVE AND OBJECTIVE BOX
O/N: BRAYDEN, VSS  8/27: VAC changed, VSS    STATUS POST:  7/24: SBR resection, fistula resection, revision of esophagogegunostomy drain through esophageal hiatus in R mediastinum  7/29: IR drainage of 800 cc of succus  7/29: Ex-lap, SB anastamosis in BP limb breakdown with succus throughout abdomen  8/1: abd washout, drainage of abscess, biologic mesh placement, closure of abdominal wall, vac and retention suture  8/7: abd washout, mesh removal, frozen abd noted, LLQ CHECO replaced with benson drain  8/10: leak noted from previous anastamosis site on L side, mid abdomen malecot drain placed in enterotomy, JPs x 2 placed, necrotic fascia debrided, vicryl mesh sutured to fascia circumferentially, xeroform over mesh then vac dressing placed O/N: BRAYDEN, VSS  8/27: VAC changed, VSS    STATUS POST:  7/24: SBR resection, fistula resection, revision of esophagogegunostomy drain through esophageal hiatus in R mediastinum  7/29: IR drainage of 800 cc of succus  7/29: Ex-lap, SB anastamosis in BP limb breakdown with succus throughout abdomen  8/1: abd washout, drainage of abscess, biologic mesh placement, closure of abdominal wall, vac and retention suture  8/7: abd washout, mesh removal, frozen abd noted, LLQ CHECO replaced with benson drain  8/10: leak noted from previous anastamosis site on L side, mid abdomen malecot drain placed in enterotomy, JPs x 2 placed, necrotic fascia debrided, vicryl mesh sutured to fascia circumferentially, xeroform over mesh then vac dressing placed	       SUBJECTIVE: Patient seen and examined bedside by chief resident. No acute events overnight. Resting comfortably in bed.     heparin  Injectable 5000 Unit(s) SubCutaneous every 8 hours      Vital Signs Last 24 Hrs  T(C): 37.2 (28 Aug 2017 05:15), Max: 37.2 (28 Aug 2017 05:15)  T(F): 98.9 (28 Aug 2017 05:15), Max: 98.9 (28 Aug 2017 05:15)  HR: 76 (28 Aug 2017 05:15) (74 - 108)  BP: 121/75 (28 Aug 2017 05:15) (116/77 - 139/78)  BP(mean): 93 (28 Aug 2017 05:15) (91 - 101)  RR: 16 (28 Aug 2017 05:15) (16 - 17)  SpO2: 94% (28 Aug 2017 05:15) (94% - 99%)  I&O's Detail    26 Aug 2017 07:01  -  27 Aug 2017 07:00  --------------------------------------------------------  IN:    Fat Emulsion 20%: 208 mL    IV PiggyBack: 200 mL    TPN (Total Parenteral Nutrition): 1536 mL  Total IN: 1944 mL    OUT:    Bulb: 270 mL    Bulb: 355 mL    Drain: 375 mL    VAC (Vacuum Assisted Closure) System: 675 mL    Voided: 400 mL  Total OUT: 2075 mL    Total NET: -131 mL      27 Aug 2017 07:01  -  28 Aug 2017 06:36  --------------------------------------------------------  IN:    Fat Emulsion 20%: 187.2 mL    TPN (Total Parenteral Nutrition): 1344 mL  Total IN: 1531.2 mL    OUT:    Bulb: 280 mL    Bulb: 190 mL    Drain: 275 mL    VAC (Vacuum Assisted Closure) System: 575 mL    Voided: 600 mL  Total OUT: 1920 mL    Total NET: -388.8 mL          General: NAD, resting comfortably in bed  Abd: soft, NT/ND   vac in palce    LABS:                        9.2    9.0   )-----------( 284      ( 28 Aug 2017 05:54 )             29.9     08-27    136  |  98  |  29<H>  ----------------------------<  133<H>  4.7   |  24  |  0.60    Ca    9.6      27 Aug 2017 06:24  Phos  4.1     08-27  Mg     2.2     08-27            RADIOLOGY & ADDITIONAL STUDIES:

## 2017-08-28 NOTE — PROGRESS NOTE ADULT - SUBJECTIVE AND OBJECTIVE BOX
andrzej looks fine and is very stable  her drainage has picked up through all outputs which is related to eating and has odor which is representative   I would reduce her po to water  also will start and oral antibiotic such as kanamycin to reduce odor  while fisutla contained from infection point of view   not from point of view that is healing well and can feed adlib  no surgical option at present and will have to wait a minimum of three months and preferrably longer  will increase somatostatin and do above

## 2017-08-29 LAB
ANION GAP SERPL CALC-SCNC: 14 MMOL/L — SIGNIFICANT CHANGE UP (ref 5–17)
BUN SERPL-MCNC: 28 MG/DL — HIGH (ref 7–23)
CALCIUM SERPL-MCNC: 9.4 MG/DL — SIGNIFICANT CHANGE UP (ref 8.4–10.5)
CHLORIDE SERPL-SCNC: 99 MMOL/L — SIGNIFICANT CHANGE UP (ref 96–108)
CO2 SERPL-SCNC: 22 MMOL/L — SIGNIFICANT CHANGE UP (ref 22–31)
CREAT SERPL-MCNC: 0.5 MG/DL — SIGNIFICANT CHANGE UP (ref 0.5–1.3)
GLUCOSE SERPL-MCNC: 128 MG/DL — HIGH (ref 70–99)
HCT VFR BLD CALC: 30.2 % — LOW (ref 34.5–45)
HGB BLD-MCNC: 9.3 G/DL — LOW (ref 11.5–15.5)
MAGNESIUM SERPL-MCNC: 2 MG/DL — SIGNIFICANT CHANGE UP (ref 1.6–2.6)
MCHC RBC-ENTMCNC: 28.4 PG — SIGNIFICANT CHANGE UP (ref 27–34)
MCHC RBC-ENTMCNC: 30.8 G/DL — LOW (ref 32–36)
MCV RBC AUTO: 92.4 FL — SIGNIFICANT CHANGE UP (ref 80–100)
PHOSPHATE SERPL-MCNC: 3.8 MG/DL — SIGNIFICANT CHANGE UP (ref 2.5–4.5)
PLATELET # BLD AUTO: 251 K/UL — SIGNIFICANT CHANGE UP (ref 150–400)
POTASSIUM SERPL-MCNC: 4.3 MMOL/L — SIGNIFICANT CHANGE UP (ref 3.5–5.3)
POTASSIUM SERPL-SCNC: 4.3 MMOL/L — SIGNIFICANT CHANGE UP (ref 3.5–5.3)
RBC # BLD: 3.27 M/UL — LOW (ref 3.8–5.2)
RBC # FLD: 18.1 % — HIGH (ref 10.3–16.9)
SODIUM SERPL-SCNC: 135 MMOL/L — SIGNIFICANT CHANGE UP (ref 135–145)
WBC # BLD: 8.5 K/UL — SIGNIFICANT CHANGE UP (ref 3.8–10.5)
WBC # FLD AUTO: 8.5 K/UL — SIGNIFICANT CHANGE UP (ref 3.8–10.5)

## 2017-08-29 PROCEDURE — 49424 ASSESS CYST CONTRAST INJECT: CPT

## 2017-08-29 PROCEDURE — 76080 X-RAY EXAM OF FISTULA: CPT | Mod: 26

## 2017-08-29 PROCEDURE — 75984 XRAY CONTROL CATHETER CHANGE: CPT | Mod: 26

## 2017-08-29 PROCEDURE — 49423 EXCHANGE DRAINAGE CATHETER: CPT

## 2017-08-29 PROCEDURE — 99152 MOD SED SAME PHYS/QHP 5/>YRS: CPT

## 2017-08-29 RX ORDER — COLLAGENASE CLOSTRIDIUM HIST. 250 UNIT/G
1 OINTMENT (GRAM) TOPICAL DAILY
Qty: 0 | Refills: 0 | Status: DISCONTINUED | OUTPATIENT
Start: 2017-08-29 | End: 2017-12-27

## 2017-08-29 RX ORDER — DIPHENHYDRAMINE HCL 50 MG
25 CAPSULE ORAL ONCE
Qty: 0 | Refills: 0 | Status: COMPLETED | OUTPATIENT
Start: 2017-08-29 | End: 2017-08-29

## 2017-08-29 RX ORDER — I.V. FAT EMULSION 20 G/100ML
50 EMULSION INTRAVENOUS ONCE
Qty: 0 | Refills: 0 | Status: COMPLETED | OUTPATIENT
Start: 2017-08-29 | End: 2017-08-29

## 2017-08-29 RX ORDER — HYDROMORPHONE HYDROCHLORIDE 2 MG/ML
0.5 INJECTION INTRAMUSCULAR; INTRAVENOUS; SUBCUTANEOUS ONCE
Qty: 0 | Refills: 0 | Status: DISCONTINUED | OUTPATIENT
Start: 2017-08-29 | End: 2017-08-29

## 2017-08-29 RX ORDER — ELECTROLYTE SOLUTION,INJ
1 VIAL (ML) INTRAVENOUS
Qty: 0 | Refills: 0 | Status: DISCONTINUED | OUTPATIENT
Start: 2017-08-29 | End: 2017-08-29

## 2017-08-29 RX ADMIN — SODIUM CHLORIDE 10 MILLILITER(S): 9 INJECTION INTRAMUSCULAR; INTRAVENOUS; SUBCUTANEOUS at 01:54

## 2017-08-29 RX ADMIN — I.V. FAT EMULSION 20.83 GRAM(S): 20 EMULSION INTRAVENOUS at 21:46

## 2017-08-29 RX ADMIN — HYDROMORPHONE HYDROCHLORIDE 1 MILLIGRAM(S): 2 INJECTION INTRAMUSCULAR; INTRAVENOUS; SUBCUTANEOUS at 04:03

## 2017-08-29 RX ADMIN — NYSTATIN CREAM 1 APPLICATION(S): 100000 CREAM TOPICAL at 18:37

## 2017-08-29 RX ADMIN — HYDROMORPHONE HYDROCHLORIDE 1 MILLIGRAM(S): 2 INJECTION INTRAMUSCULAR; INTRAVENOUS; SUBCUTANEOUS at 20:46

## 2017-08-29 RX ADMIN — HYDROMORPHONE HYDROCHLORIDE 0.5 MILLIGRAM(S): 2 INJECTION INTRAMUSCULAR; INTRAVENOUS; SUBCUTANEOUS at 10:00

## 2017-08-29 RX ADMIN — Medication 25 MILLIGRAM(S): at 22:41

## 2017-08-29 RX ADMIN — PANTOPRAZOLE SODIUM 40 MILLIGRAM(S): 20 TABLET, DELAYED RELEASE ORAL at 12:37

## 2017-08-29 RX ADMIN — HYDROMORPHONE HYDROCHLORIDE 0.5 MILLIGRAM(S): 2 INJECTION INTRAMUSCULAR; INTRAVENOUS; SUBCUTANEOUS at 09:26

## 2017-08-29 RX ADMIN — HYDROMORPHONE HYDROCHLORIDE 1 MILLIGRAM(S): 2 INJECTION INTRAMUSCULAR; INTRAVENOUS; SUBCUTANEOUS at 02:20

## 2017-08-29 RX ADMIN — HEPARIN SODIUM 5000 UNIT(S): 5000 INJECTION INTRAVENOUS; SUBCUTANEOUS at 05:59

## 2017-08-29 RX ADMIN — HYDROMORPHONE HYDROCHLORIDE 0.5 MILLIGRAM(S): 2 INJECTION INTRAMUSCULAR; INTRAVENOUS; SUBCUTANEOUS at 19:00

## 2017-08-29 RX ADMIN — HYDROMORPHONE HYDROCHLORIDE 0.5 MILLIGRAM(S): 2 INJECTION INTRAMUSCULAR; INTRAVENOUS; SUBCUTANEOUS at 00:26

## 2017-08-29 RX ADMIN — Medication 2 MILLIGRAM(S): at 21:46

## 2017-08-29 RX ADMIN — Medication 25 MILLIGRAM(S): at 01:45

## 2017-08-29 RX ADMIN — HYDROMORPHONE HYDROCHLORIDE 0.5 MILLIGRAM(S): 2 INJECTION INTRAMUSCULAR; INTRAVENOUS; SUBCUTANEOUS at 00:40

## 2017-08-29 RX ADMIN — Medication 1 APPLICATION(S): at 12:38

## 2017-08-29 RX ADMIN — HYDROMORPHONE HYDROCHLORIDE 1 MILLIGRAM(S): 2 INJECTION INTRAMUSCULAR; INTRAVENOUS; SUBCUTANEOUS at 06:15

## 2017-08-29 RX ADMIN — HYDROMORPHONE HYDROCHLORIDE 1 MILLIGRAM(S): 2 INJECTION INTRAMUSCULAR; INTRAVENOUS; SUBCUTANEOUS at 02:05

## 2017-08-29 RX ADMIN — Medication 10 MILLIGRAM(S): at 12:33

## 2017-08-29 RX ADMIN — Medication 25 MILLIGRAM(S): at 13:45

## 2017-08-29 RX ADMIN — HYDROMORPHONE HYDROCHLORIDE 1 MILLIGRAM(S): 2 INJECTION INTRAMUSCULAR; INTRAVENOUS; SUBCUTANEOUS at 11:02

## 2017-08-29 RX ADMIN — HYDROMORPHONE HYDROCHLORIDE 0.5 MILLIGRAM(S): 2 INJECTION INTRAMUSCULAR; INTRAVENOUS; SUBCUTANEOUS at 23:10

## 2017-08-29 RX ADMIN — HYDROMORPHONE HYDROCHLORIDE 0.5 MILLIGRAM(S): 2 INJECTION INTRAMUSCULAR; INTRAVENOUS; SUBCUTANEOUS at 18:37

## 2017-08-29 RX ADMIN — HYDROMORPHONE HYDROCHLORIDE 1 MILLIGRAM(S): 2 INJECTION INTRAMUSCULAR; INTRAVENOUS; SUBCUTANEOUS at 06:00

## 2017-08-29 RX ADMIN — HYDROMORPHONE HYDROCHLORIDE 0.5 MILLIGRAM(S): 2 INJECTION INTRAMUSCULAR; INTRAVENOUS; SUBCUTANEOUS at 22:41

## 2017-08-29 RX ADMIN — Medication 25 MILLIGRAM(S): at 19:40

## 2017-08-29 RX ADMIN — HYDROMORPHONE HYDROCHLORIDE 1 MILLIGRAM(S): 2 INJECTION INTRAMUSCULAR; INTRAVENOUS; SUBCUTANEOUS at 04:21

## 2017-08-29 RX ADMIN — HEPARIN SODIUM 5000 UNIT(S): 5000 INJECTION INTRAVENOUS; SUBCUTANEOUS at 21:46

## 2017-08-29 RX ADMIN — NYSTATIN CREAM 1 APPLICATION(S): 100000 CREAM TOPICAL at 06:01

## 2017-08-29 RX ADMIN — NEOMYCIN SULFATE 500 MILLIGRAM(S): 500 TABLET ORAL at 12:38

## 2017-08-29 RX ADMIN — HEPARIN SODIUM 5000 UNIT(S): 5000 INJECTION INTRAVENOUS; SUBCUTANEOUS at 14:04

## 2017-08-29 RX ADMIN — HYDROMORPHONE HYDROCHLORIDE 1 MILLIGRAM(S): 2 INJECTION INTRAMUSCULAR; INTRAVENOUS; SUBCUTANEOUS at 21:15

## 2017-08-29 RX ADMIN — Medication 1 EACH: at 19:06

## 2017-08-29 RX ADMIN — HYDROMORPHONE HYDROCHLORIDE 1 MILLIGRAM(S): 2 INJECTION INTRAMUSCULAR; INTRAVENOUS; SUBCUTANEOUS at 11:20

## 2017-08-29 RX ADMIN — Medication 25 MILLIGRAM(S): at 07:45

## 2017-08-29 NOTE — PROGRESS NOTE ADULT - SUBJECTIVE AND OBJECTIVE BOX
O/N: BRAYDEN, VSS, +BM  8/28: Per Dr Brady --> patient to be on sips of water, given suppository daily and started on neomycin    Surgeries:  7/24: SBR resection, fistula resection, revision of esophagogegunostomy drain through esophageal hiatus in R mediastinum  7/29: IR drainage of 800 cc of succus  7/29: Ex-lap, SB anastamosis in BP limb breakdown with succus throughout abdomen  8/1: abd washout, drainage of abscess, biologic mesh placement, closure of abdominal wall, vac and retention suture  8/7: abd washout, mesh removal, frozen abd noted, LLQ CHECO replaced with benson drain  8/10: leak noted from previous anastamosis site on L side, mid abdomen malecot drain placed in enterotomy, JPs x 2 placed, necrotic fascia debrided, vicryl mesh sutured to fascia circumferentially, xeroform over mesh then vac dressing placed O/N: BRAYDEN, VSS, +BM  8/28: Per Dr Brady --> patient to be on sips of water, given suppository daily and started on neomycin    Surgeries:  7/24: SBR resection, fistula resection, revision of esophagogegunostomy drain through esophageal hiatus in R mediastinum  7/29: IR drainage of 800 cc of succus  7/29: Ex-lap, SB anastamosis in BP limb breakdown with succus throughout abdomen  8/1: abd washout, drainage of abscess, biologic mesh placement, closure of abdominal wall, vac and retention suture  8/7: abd washout, mesh removal, frozen abd noted, LLQ CHECO replaced with benson drain  8/10: leak noted from previous anastamosis site on L side, mid abdomen malecot drain placed in enterotomy, JPs x 2 placed, necrotic fascia debrided, vicryl mesh sutured to fascia circumferentially, xeroform over mesh then vac dressing placed	     SUBJECTIVE:  Flatus: [ x] YES [ ] NO             Bowel Movement: [x ] YES [ ] NO  Pain Control Adequate: [x ] YES [ ] NO  Nausea: [ ] YES [ x] NO            Vomiting: [ ] YES [x ] NO  Diarrhea: [ ] YES [x ] NO         Constipation: [ ] YES [x ] NO     Chest Pain: [ ] YES [x ] NO    SOB:  [ ] YES [x ] NO    MEDICATIONS  (STANDING):  insulin lispro (HumaLOG) corrective regimen sliding scale   SubCutaneous every 6 hours  dextrose 5%. 1000 milliLiter(s) (50 mL/Hr) IV Continuous <Continuous>  dextrose 50% Injectable 25 Gram(s) IV Push once  sodium chloride 0.9% lock flush 20 milliLiter(s) IV Push once  cyanocobalamin Injectable 1000 MICROGram(s) SubCutaneous every 7 days  dextrose 50% Injectable 12.5 Gram(s) IV Push once  nystatin Powder 1 Application(s) Topical two times a day  pantoprazole  Injectable 40 milliGRAM(s) IV Push daily  calcitriol Injectable 1 MICROGram(s) IV Push <User Schedule>  heparin  Injectable 5000 Unit(s) SubCutaneous every 8 hours  bisacodyl Suppository 10 milliGRAM(s) Rectal daily  Parenteral Nutrition - Adult 1 Each (64 mL/Hr) TPN Continuous <Continuous>  neomycin 500 milliGRAM(s) Oral daily  Parenteral Nutrition - Adult 1 Each (64 mL/Hr) TPN Continuous <Continuous>    MEDICATIONS  (PRN):  ondansetron Injectable 4 milliGRAM(s) IV Push every 6 hours PRN Nausea  sodium chloride 0.9% lock flush 10 milliLiter(s) IV Push every 1 hour PRN After each medication administration  sodium chloride 0.9% lock flush 10 milliLiter(s) IV Push every 12 hours PRN Lumen of catheter NOT used  diphenhydrAMINE   Injectable 25 milliGRAM(s) IV Push every 6 hours PRN Rash and/or Itching  HYDROmorphone  Injectable 1 milliGRAM(s) IV Push every 4 hours PRN Severe Pain (7 - 10)  HYDROmorphone  Injectable 0.5 milliGRAM(s) IV Push every 4 hours PRN Moderate Pain (4 - 6)  diazepam    Tablet 2 milliGRAM(s) Oral at bedtime PRN anxiety      Vital Signs Last 24 Hrs  T(C): 37 (29 Aug 2017 05:27), Max: 37.7 (28 Aug 2017 09:31)  T(F): 98.6 (29 Aug 2017 05:27), Max: 99.8 (28 Aug 2017 09:31)  HR: 76 (29 Aug 2017 04:11) (74 - 88)  BP: 122/73 (29 Aug 2017 04:11) (114/73 - 139/70)  BP(mean): 98 (28 Aug 2017 20:40) (88 - 100)  RR: 18 (29 Aug 2017 04:11) (16 - 18)  SpO2: 92% (29 Aug 2017 04:11) (92% - 95%)    PHYSICAL EXAM:      Constitutional: NAD, A&Ox3    Gastrointestinal: Soft, minimally ttp, mild distention, No guarding or rebound    Extremities: no peripheral edema    Incision: wound vac in place        I&O's Detail    28 Aug 2017 07:01  -  29 Aug 2017 07:00  --------------------------------------------------------  IN:    Fat Emulsion 20%: 20.8 mL    Oral Fluid: 200 mL    TPN (Total Parenteral Nutrition): 768 mL  Total IN: 988.8 mL    OUT:    Bulb: 100 mL    Bulb: 160 mL    VAC (Vacuum Assisted Closure) System: 525 mL    Voided: 1350 mL  Total OUT: 2135 mL    Total NET: -1146.2 mL          LABS:                        9.2    9.0   )-----------( 284      ( 28 Aug 2017 05:54 )             29.9     08-28    135  |  99  |  29<H>  ----------------------------<  132<H>  4.3   |  23  |  0.60    Ca    9.3      28 Aug 2017 19:04  Phos  3.2     08-28  Mg     2.2     08-28    TPro  7.3  /  Alb  2.8<L>  /  TBili  1.4<H>  /  DBili  x   /  AST  11  /  ALT  12  /  AlkPhos  148<H>  08-28            RADIOLOGY & ADDITIONAL STUDIES:

## 2017-08-29 NOTE — ADVANCED PRACTICE NURSE CONSULT - RECOMMEDATIONS
Recommend Collagenase to site, spoke to house staff and RN Idalia. Pt on AirTap with wedges for repositioning.

## 2017-08-29 NOTE — ADVANCED PRACTICE NURSE CONSULT - ASSESSMENT
Re-assessment of pt's sacral wound. Site is now unstageable with 100% of wound bed covered with non-viable tissue.

## 2017-08-29 NOTE — PROGRESS NOTE ADULT - ASSESSMENT
57 F s/p  SBR, fistula resection, esophagojejunostomy revision w/ post-op course complicated by RTOR for distal anastomosis breakdown, ATN, acute respiratory failure, & septic shock.   Neuro: Dilaudid  PRN; ENT consult re: hearing loss   CV: MAP 65  Resp: Satting well on RA  GI/FEN: CLD, TPN/lipids, Vit B12, Protonix  : Voids, hold lasix 2/2 hearing loss  Endo: ISS calcitriol 2* vit d def.   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  Drains: LLQ CHECO. LUQ CHECO, Malecot in enterotomy site, wound vac to midline  Wounds: Midline xiphoid to pubis incision; DTI & stage 2 pressure ulcer noted.

## 2017-08-30 LAB
ANION GAP SERPL CALC-SCNC: 14 MMOL/L — SIGNIFICANT CHANGE UP (ref 5–17)
BUN SERPL-MCNC: 26 MG/DL — HIGH (ref 7–23)
CALCIUM SERPL-MCNC: 9.4 MG/DL — SIGNIFICANT CHANGE UP (ref 8.4–10.5)
CHLORIDE SERPL-SCNC: 101 MMOL/L — SIGNIFICANT CHANGE UP (ref 96–108)
CO2 SERPL-SCNC: 23 MMOL/L — SIGNIFICANT CHANGE UP (ref 22–31)
CREAT SERPL-MCNC: 0.6 MG/DL — SIGNIFICANT CHANGE UP (ref 0.5–1.3)
GLUCOSE SERPL-MCNC: 120 MG/DL — HIGH (ref 70–99)
HCT VFR BLD CALC: 31.4 % — LOW (ref 34.5–45)
HGB BLD-MCNC: 9.7 G/DL — LOW (ref 11.5–15.5)
MAGNESIUM SERPL-MCNC: 2.2 MG/DL — SIGNIFICANT CHANGE UP (ref 1.6–2.6)
MCHC RBC-ENTMCNC: 28.6 PG — SIGNIFICANT CHANGE UP (ref 27–34)
MCHC RBC-ENTMCNC: 30.9 G/DL — LOW (ref 32–36)
MCV RBC AUTO: 92.6 FL — SIGNIFICANT CHANGE UP (ref 80–100)
PHOSPHATE SERPL-MCNC: 3.8 MG/DL — SIGNIFICANT CHANGE UP (ref 2.5–4.5)
PLATELET # BLD AUTO: 283 K/UL — SIGNIFICANT CHANGE UP (ref 150–400)
POTASSIUM SERPL-MCNC: 4.5 MMOL/L — SIGNIFICANT CHANGE UP (ref 3.5–5.3)
POTASSIUM SERPL-SCNC: 4.5 MMOL/L — SIGNIFICANT CHANGE UP (ref 3.5–5.3)
RBC # BLD: 3.39 M/UL — LOW (ref 3.8–5.2)
RBC # FLD: 18.1 % — HIGH (ref 10.3–16.9)
SODIUM SERPL-SCNC: 138 MMOL/L — SIGNIFICANT CHANGE UP (ref 135–145)
WBC # BLD: 8.8 K/UL — SIGNIFICANT CHANGE UP (ref 3.8–10.5)
WBC # FLD AUTO: 8.8 K/UL — SIGNIFICANT CHANGE UP (ref 3.8–10.5)

## 2017-08-30 RX ORDER — PIPERACILLIN AND TAZOBACTAM 4; .5 G/20ML; G/20ML
4.5 INJECTION, POWDER, LYOPHILIZED, FOR SOLUTION INTRAVENOUS EVERY 6 HOURS
Qty: 0 | Refills: 0 | Status: DISCONTINUED | OUTPATIENT
Start: 2017-08-30 | End: 2017-09-21

## 2017-08-30 RX ORDER — HYDROMORPHONE HYDROCHLORIDE 2 MG/ML
0.5 INJECTION INTRAMUSCULAR; INTRAVENOUS; SUBCUTANEOUS ONCE
Qty: 0 | Refills: 0 | Status: DISCONTINUED | OUTPATIENT
Start: 2017-08-30 | End: 2017-08-30

## 2017-08-30 RX ORDER — HYDROMORPHONE HYDROCHLORIDE 2 MG/ML
1 INJECTION INTRAMUSCULAR; INTRAVENOUS; SUBCUTANEOUS ONCE
Qty: 0 | Refills: 0 | Status: DISCONTINUED | OUTPATIENT
Start: 2017-08-30 | End: 2017-08-30

## 2017-08-30 RX ORDER — ACETAMINOPHEN 500 MG
1000 TABLET ORAL ONCE
Qty: 0 | Refills: 0 | Status: COMPLETED | OUTPATIENT
Start: 2017-08-30 | End: 2017-08-30

## 2017-08-30 RX ORDER — DIPHENHYDRAMINE HCL 50 MG
25 CAPSULE ORAL EVERY 6 HOURS
Qty: 0 | Refills: 0 | Status: DISCONTINUED | OUTPATIENT
Start: 2017-08-30 | End: 2017-08-30

## 2017-08-30 RX ORDER — DIPHENHYDRAMINE HCL 50 MG
25 CAPSULE ORAL ONCE
Qty: 0 | Refills: 0 | Status: COMPLETED | OUTPATIENT
Start: 2017-08-30 | End: 2017-08-30

## 2017-08-30 RX ORDER — ESCITALOPRAM OXALATE 10 MG/1
20 TABLET, FILM COATED ORAL DAILY
Qty: 0 | Refills: 0 | Status: DISCONTINUED | OUTPATIENT
Start: 2017-08-30 | End: 2017-12-27

## 2017-08-30 RX ORDER — ELECTROLYTE SOLUTION,INJ
1 VIAL (ML) INTRAVENOUS
Qty: 0 | Refills: 0 | Status: DISCONTINUED | OUTPATIENT
Start: 2017-08-30 | End: 2017-08-30

## 2017-08-30 RX ADMIN — HYDROMORPHONE HYDROCHLORIDE 1 MILLIGRAM(S): 2 INJECTION INTRAMUSCULAR; INTRAVENOUS; SUBCUTANEOUS at 11:07

## 2017-08-30 RX ADMIN — Medication 10 MILLIGRAM(S): at 11:54

## 2017-08-30 RX ADMIN — HYDROMORPHONE HYDROCHLORIDE 1 MILLIGRAM(S): 2 INJECTION INTRAMUSCULAR; INTRAVENOUS; SUBCUTANEOUS at 15:20

## 2017-08-30 RX ADMIN — HYDROMORPHONE HYDROCHLORIDE 1 MILLIGRAM(S): 2 INJECTION INTRAMUSCULAR; INTRAVENOUS; SUBCUTANEOUS at 15:05

## 2017-08-30 RX ADMIN — Medication 25 MILLIGRAM(S): at 09:46

## 2017-08-30 RX ADMIN — HYDROMORPHONE HYDROCHLORIDE 0.5 MILLIGRAM(S): 2 INJECTION INTRAMUSCULAR; INTRAVENOUS; SUBCUTANEOUS at 09:15

## 2017-08-30 RX ADMIN — Medication 25 MILLIGRAM(S): at 17:35

## 2017-08-30 RX ADMIN — HYDROMORPHONE HYDROCHLORIDE 1 MILLIGRAM(S): 2 INJECTION INTRAMUSCULAR; INTRAVENOUS; SUBCUTANEOUS at 14:39

## 2017-08-30 RX ADMIN — Medication 1 APPLICATION(S): at 11:54

## 2017-08-30 RX ADMIN — HYDROMORPHONE HYDROCHLORIDE 1 MILLIGRAM(S): 2 INJECTION INTRAMUSCULAR; INTRAVENOUS; SUBCUTANEOUS at 04:56

## 2017-08-30 RX ADMIN — Medication 25 MILLIGRAM(S): at 05:11

## 2017-08-30 RX ADMIN — HYDROMORPHONE HYDROCHLORIDE 1 MILLIGRAM(S): 2 INJECTION INTRAMUSCULAR; INTRAVENOUS; SUBCUTANEOUS at 19:00

## 2017-08-30 RX ADMIN — Medication 1 EACH: at 18:12

## 2017-08-30 RX ADMIN — CALCITRIOL 1 MICROGRAM(S): 0.5 CAPSULE ORAL at 11:36

## 2017-08-30 RX ADMIN — PANTOPRAZOLE SODIUM 40 MILLIGRAM(S): 20 TABLET, DELAYED RELEASE ORAL at 11:36

## 2017-08-30 RX ADMIN — Medication 2 MILLIGRAM(S): at 21:29

## 2017-08-30 RX ADMIN — HYDROMORPHONE HYDROCHLORIDE 1 MILLIGRAM(S): 2 INJECTION INTRAMUSCULAR; INTRAVENOUS; SUBCUTANEOUS at 19:15

## 2017-08-30 RX ADMIN — HEPARIN SODIUM 5000 UNIT(S): 5000 INJECTION INTRAVENOUS; SUBCUTANEOUS at 21:29

## 2017-08-30 RX ADMIN — HYDROMORPHONE HYDROCHLORIDE 1 MILLIGRAM(S): 2 INJECTION INTRAMUSCULAR; INTRAVENOUS; SUBCUTANEOUS at 08:24

## 2017-08-30 RX ADMIN — HYDROMORPHONE HYDROCHLORIDE 0.5 MILLIGRAM(S): 2 INJECTION INTRAMUSCULAR; INTRAVENOUS; SUBCUTANEOUS at 10:06

## 2017-08-30 RX ADMIN — HEPARIN SODIUM 5000 UNIT(S): 5000 INJECTION INTRAVENOUS; SUBCUTANEOUS at 14:38

## 2017-08-30 RX ADMIN — HYDROMORPHONE HYDROCHLORIDE 1 MILLIGRAM(S): 2 INJECTION INTRAMUSCULAR; INTRAVENOUS; SUBCUTANEOUS at 01:20

## 2017-08-30 RX ADMIN — NEOMYCIN SULFATE 500 MILLIGRAM(S): 500 TABLET ORAL at 11:54

## 2017-08-30 RX ADMIN — PIPERACILLIN AND TAZOBACTAM 200 GRAM(S): 4; .5 INJECTION, POWDER, LYOPHILIZED, FOR SOLUTION INTRAVENOUS at 18:11

## 2017-08-30 RX ADMIN — HYDROMORPHONE HYDROCHLORIDE 0.5 MILLIGRAM(S): 2 INJECTION INTRAMUSCULAR; INTRAVENOUS; SUBCUTANEOUS at 03:35

## 2017-08-30 RX ADMIN — HEPARIN SODIUM 5000 UNIT(S): 5000 INJECTION INTRAVENOUS; SUBCUTANEOUS at 05:50

## 2017-08-30 RX ADMIN — HYDROMORPHONE HYDROCHLORIDE 1 MILLIGRAM(S): 2 INJECTION INTRAMUSCULAR; INTRAVENOUS; SUBCUTANEOUS at 10:52

## 2017-08-30 RX ADMIN — HYDROMORPHONE HYDROCHLORIDE 1 MILLIGRAM(S): 2 INJECTION INTRAMUSCULAR; INTRAVENOUS; SUBCUTANEOUS at 00:49

## 2017-08-30 RX ADMIN — HYDROMORPHONE HYDROCHLORIDE 1 MILLIGRAM(S): 2 INJECTION INTRAMUSCULAR; INTRAVENOUS; SUBCUTANEOUS at 08:40

## 2017-08-30 RX ADMIN — NYSTATIN CREAM 1 APPLICATION(S): 100000 CREAM TOPICAL at 07:31

## 2017-08-30 RX ADMIN — PREGABALIN 1000 MICROGRAM(S): 225 CAPSULE ORAL at 11:55

## 2017-08-30 RX ADMIN — Medication 25 MILLIGRAM(S): at 01:38

## 2017-08-30 RX ADMIN — HYDROMORPHONE HYDROCHLORIDE 0.5 MILLIGRAM(S): 2 INJECTION INTRAMUSCULAR; INTRAVENOUS; SUBCUTANEOUS at 10:20

## 2017-08-30 RX ADMIN — Medication 400 MILLIGRAM(S): at 21:10

## 2017-08-30 RX ADMIN — NYSTATIN CREAM 1 APPLICATION(S): 100000 CREAM TOPICAL at 18:12

## 2017-08-30 RX ADMIN — HYDROMORPHONE HYDROCHLORIDE 0.5 MILLIGRAM(S): 2 INJECTION INTRAMUSCULAR; INTRAVENOUS; SUBCUTANEOUS at 03:08

## 2017-08-30 RX ADMIN — ESCITALOPRAM OXALATE 20 MILLIGRAM(S): 10 TABLET, FILM COATED ORAL at 21:29

## 2017-08-30 RX ADMIN — HYDROMORPHONE HYDROCHLORIDE 1 MILLIGRAM(S): 2 INJECTION INTRAMUSCULAR; INTRAVENOUS; SUBCUTANEOUS at 04:45

## 2017-08-30 RX ADMIN — PIPERACILLIN AND TAZOBACTAM 200 GRAM(S): 4; .5 INJECTION, POWDER, LYOPHILIZED, FOR SOLUTION INTRAVENOUS at 11:55

## 2017-08-30 RX ADMIN — HYDROMORPHONE HYDROCHLORIDE 1 MILLIGRAM(S): 2 INJECTION INTRAMUSCULAR; INTRAVENOUS; SUBCUTANEOUS at 14:54

## 2017-08-30 RX ADMIN — HYDROMORPHONE HYDROCHLORIDE 0.5 MILLIGRAM(S): 2 INJECTION INTRAMUSCULAR; INTRAVENOUS; SUBCUTANEOUS at 08:58

## 2017-08-30 NOTE — PROGRESS NOTE ADULT - ASSESSMENT
58 yo female s/p CHECO drain replacement for abscess drainage on 8/29/17.   - Continue to monitor output. When output is less than 10cc/day for 2 day, consider re-imaging for potential removal of the drain.  - Contact IR with any questions or concerns.  Discussed with Dr. Thomas.

## 2017-08-30 NOTE — PROGRESS NOTE ADULT - SUBJECTIVE AND OBJECTIVE BOX
had dr faith do tube check  all tubes seem to go to cavity and not intraluminal  he placed 14 Sami tube  will review and take other tubes out and try to get to collapse

## 2017-08-30 NOTE — PROGRESS NOTE ADULT - SUBJECTIVE AND OBJECTIVE BOX
O/N: BRAYDEN, VSS  8/29: taken off contact precautions, tube study performed, off contact per epidemiology     STATUS POST  7/24: SBR resection, fistula resection, revision of esophagogegunostomy drain through esophageal hiatus in R mediastinum  7/29: IR drainage of 800 cc of succus  7/29: Ex-lap, SB anastamosis in BP limb breakdown with succus throughout abdomen  8/1: abd washout, drainage of abscess, biologic mesh placement, closure of abdominal wall, vac and retention suture  8/7: abd washout, mesh removal, frozen abd noted, LLQ CHECO replaced with benson drain  8/10: leak noted from previous anastamosis site on L side, mid abdomen malecot drain placed in enterotomy, JPs x 2 placed, necrotic fascia debrided, vicryl mesh sutured to fascia circumferentially, xeroform over mesh then vac dressing placed O/N: BRAYDEN, VSS  8/29: taken off contact precautions, tube study performed, off contact per epidemiology     STATUS POST  7/24: SBR resection, fistula resection, revision of esophagogegunostomy drain through esophageal hiatus in R mediastinum  7/29: IR drainage of 800 cc of succus  7/29: Ex-lap, SB anastamosis in BP limb breakdown with succus throughout abdomen  8/1: abd washout, drainage of abscess, biologic mesh placement, closure of abdominal wall, vac and retention suture  8/7: abd washout, mesh removal, frozen abd noted, LLQ CHECO replaced with benson drain  8/10: leak noted from previous anastamosis site on L side, mid abdomen malecot drain placed in enterotomy, JPs x 2 placed, necrotic fascia debrided, vicryl mesh sutured to fascia circumferentially, xeroform over mesh then vac dressing placed      SUBJECTIVE: Feels well, voiced no complaints  Flatus: [x ] YES [ ] NO             Bowel Movement: [x ] YES [ ] NO  Pain (0-10):            Pain Control Adequate: [x ] YES [ ] NO  Nausea: [ ] YES [x ] NO            Vomiting: [ ] YES [x ] NO  Diarrhea: [ ] YES [x ] NO         Constipation: [ ] YES [x ] NO     Chest Pain: [ ] YES [x ] NO    SOB:  [ ] YES [x ] NO    MEDICATIONS  (STANDING):  insulin lispro (HumaLOG) corrective regimen sliding scale   SubCutaneous every 6 hours  dextrose 5%. 1000 milliLiter(s) (50 mL/Hr) IV Continuous <Continuous>  dextrose 50% Injectable 25 Gram(s) IV Push once  sodium chloride 0.9% lock flush 20 milliLiter(s) IV Push once  cyanocobalamin Injectable 1000 MICROGram(s) SubCutaneous every 7 days  dextrose 50% Injectable 12.5 Gram(s) IV Push once  nystatin Powder 1 Application(s) Topical two times a day  pantoprazole  Injectable 40 milliGRAM(s) IV Push daily  calcitriol Injectable 1 MICROGram(s) IV Push <User Schedule>  heparin  Injectable 5000 Unit(s) SubCutaneous every 8 hours  bisacodyl Suppository 10 milliGRAM(s) Rectal daily  neomycin 500 milliGRAM(s) Oral daily  Parenteral Nutrition - Adult 1 Each (64 mL/Hr) TPN Continuous <Continuous>  collagenase Ointment 1 Application(s) Topical daily  HYDROmorphone  Injectable 0.5 milliGRAM(s) IV Push once    MEDICATIONS  (PRN):  ondansetron Injectable 4 milliGRAM(s) IV Push every 6 hours PRN Nausea  sodium chloride 0.9% lock flush 10 milliLiter(s) IV Push every 1 hour PRN After each medication administration  sodium chloride 0.9% lock flush 10 milliLiter(s) IV Push every 12 hours PRN Lumen of catheter NOT used  diphenhydrAMINE   Injectable 25 milliGRAM(s) IV Push every 6 hours PRN Rash and/or Itching  HYDROmorphone  Injectable 1 milliGRAM(s) IV Push every 4 hours PRN Severe Pain (7 - 10)  HYDROmorphone  Injectable 0.5 milliGRAM(s) IV Push every 4 hours PRN Moderate Pain (4 - 6)  diazepam    Tablet 2 milliGRAM(s) Oral at bedtime PRN anxiety      Vital Signs Last 24 Hrs  T(C): 37.1 (30 Aug 2017 05:00), Max: 37.8 (29 Aug 2017 18:08)  T(F): 98.8 (30 Aug 2017 05:00), Max: 991 (29 Aug 2017 20:46)  HR: 77 (30 Aug 2017 05:53) (77 - 110)  BP: 109/69 (30 Aug 2017 05:53) (109/69 - 155/80)  BP(mean): 84 (29 Aug 2017 20:46) (84 - 108)  RR: 16 (30 Aug 2017 05:53) (16 - 17)  SpO2: 95% (30 Aug 2017 05:53) (93% - 97%)    PHYSICAL EXAM:      Constitutional: A&Ox3    Respiratory: non labored breathing, no respiratory distress    Cardiovascular: NSR, RRR    Gastrointestinal: VAC changed, tissue appears healthy, drains in places x 3                 Incision:    Genitourinary:    Extremities: (-) edema        I&O's Detail    29 Aug 2017 07:01  -  30 Aug 2017 07:00  --------------------------------------------------------  IN:    Fat Emulsion 20%: 187.2 mL    Oral Fluid: 100 mL    TPN (Total Parenteral Nutrition): 1344 mL  Total IN: 1631.2 mL    OUT:    Bulb: 120 mL    Bulb: 57 mL    Drain: 45 mL    VAC (Vacuum Assisted Closure) System: 400 mL    Voided: 1200 mL  Total OUT: 1822 mL    Total NET: -190.8 mL          LABS:                        9.7    8.8   )-----------( 283      ( 30 Aug 2017 07:07 )             31.4     08-30    138  |  101  |  26<H>  ----------------------------<  120<H>  4.5   |  23  |  0.60    Ca    9.4      30 Aug 2017 07:07  Phos  3.8     08-30  Mg     2.2     08-30            RADIOLOGY & ADDITIONAL STUDIES:

## 2017-08-30 NOTE — PROGRESS NOTE ADULT - ASSESSMENT
57 F s/p  SBR, fistula resection, esophagojejunostomy revision w/ post-op course complicated by RTOR for distal anastomosis breakdown, ATN, acute respiratory failure, & septic shock.   Neuro: Dilaudid  PRN; ENT consult re: hearing loss   CV: MAP 65  Resp: Satting well on RA  GI/FEN: NPO, TPN/lipids, Vit B12, Protonix  : Voids, hold lasix 2/2 hearing loss  Endo: ISS calcitriol 2* vit d def.   ID: neomycin (9/28-),  // D/c Perioperative Gent/Vanc, colistin (7/29-8/1), vanc (8/1-8/2), micafungin (7/29-8/7), zosyn (8/1-11), fluconazole (8/7-8/15), linezolid (7/25-8/1; 8/2-8/15), anastamotic leak: meropenem (8/11-8/19)  Heme/PPX: SCDs, SQH  Lines: L subclavian TLC (8/10--), [place] (7/24-8/8), L basilic vein PICC (7/25-8/8), L IJ TLC (7/30-8/10) Axillary to subclavian DVT chronic since January.  Drains: LLQ CHECO. LUQ CHECO, Malecot in enterotomy site, wound vac to midline  Wounds: Midline xiphoid to pubis incision; DTI & stage 2 pressure ulcer noted.

## 2017-08-30 NOTE — ADVANCED PRACTICE NURSE CONSULT - ASSESSMENT
New wound vac dressing applied with [employer] reps at bedside. Periwound with folliculitis, area protected with Nystatin powder and sealed in with skin barrier wipes. Stomahesive barriers placed around periwound for protection. Wound measures approx [id number] cm with fistula drainage noted on left side of wound and in center. Fistulas  from wound with fistula isolator. Wound bed protected with stoma powder then adaptic touch silicone dressing. Granufoam placed in wound bed and vac at 125 mm/Hg. Will continue to monitor.

## 2017-08-30 NOTE — CHART NOTE - NSCHARTNOTEFT_GEN_A_CORE
Admitting Diagnosis:   Patient is a 57y old  Female who presents with a chief complaint of enterocutaneous fistula (24 Jul 2017 14:15)      PAST MEDICAL & SURGICAL HISTORY:  Reflux  Obesity  DVT (deep venous thrombosis): 2013 neck  Diverticulitis  Hypertension  Elective surgery: abodominal wall surgery  dec 2016  Elective surgery: NOV 2016 gastric bypass revision  Gastric bypass status for obesity: gastric sleeve, 6/2012  S/P breast biopsy: 2011, left  S/P colon resection: 2011  S/P knee surgery: repair 2009, 2011  right; left  Umbilical hernia: 2000  S/P appendectomy: 1975  S/P tonsillectomy: 1967      Current Nutrition Order:  TPN: Central Line: 254 g D, 104 g AA, 50 g lipids, & 1771 mL fluid. Providing pt with 1770 kcal, 104 g protein, & 1858 mL fluid. GIR = 1.8    GI Issues: WDL per flowsheet    Pain: Being managed    Skin Integrity: surgical incision    Labs:   08-30    138  |  101  |  26<H>  ----------------------------<  120<H>  4.5   |  23  |  0.60    Ca    9.4      30 Aug 2017 07:07  Phos  3.8     08-30  Mg     2.2     08-30      CAPILLARY BLOOD GLUCOSE  118 (30 Aug 2017 06:00)  129 (29 Aug 2017 18:08)    Medications:  MEDICATIONS  (STANDING):  insulin lispro (HumaLOG) corrective regimen sliding scale   SubCutaneous every 6 hours  dextrose 5%. 1000 milliLiter(s) (50 mL/Hr) IV Continuous <Continuous>  dextrose 50% Injectable 25 Gram(s) IV Push once  sodium chloride 0.9% lock flush 20 milliLiter(s) IV Push once  cyanocobalamin Injectable 1000 MICROGram(s) SubCutaneous every 7 days  dextrose 50% Injectable 12.5 Gram(s) IV Push once  nystatin Powder 1 Application(s) Topical two times a day  pantoprazole  Injectable 40 milliGRAM(s) IV Push daily  calcitriol Injectable 1 MICROGram(s) IV Push <User Schedule>  heparin  Injectable 5000 Unit(s) SubCutaneous every 8 hours  bisacodyl Suppository 10 milliGRAM(s) Rectal daily  Parenteral Nutrition - Adult 1 Each (64 mL/Hr) TPN Continuous <Continuous>  collagenase Ointment 1 Application(s) Topical daily  piperacillin/tazobactam IVPB. 4.5 Gram(s) IV Intermittent every 6 hours  escitalopram 20 milliGRAM(s) Oral daily  Parenteral Nutrition - Adult 1 Each (64 mL/Hr) TPN Continuous <Continuous>  HYDROmorphone  Injectable 1 milliGRAM(s) IV Push once    MEDICATIONS  (PRN):  ondansetron Injectable 4 milliGRAM(s) IV Push every 6 hours PRN Nausea  sodium chloride 0.9% lock flush 10 milliLiter(s) IV Push every 1 hour PRN After each medication administration  sodium chloride 0.9% lock flush 10 milliLiter(s) IV Push every 12 hours PRN Lumen of catheter NOT used  diphenhydrAMINE   Injectable 25 milliGRAM(s) IV Push every 6 hours PRN Rash and/or Itching  HYDROmorphone  Injectable 1 milliGRAM(s) IV Push every 4 hours PRN Severe Pain (7 - 10)  HYDROmorphone  Injectable 0.5 milliGRAM(s) IV Push every 4 hours PRN Moderate Pain (4 - 6)  diazepam    Tablet 2 milliGRAM(s) Oral at bedtime PRN anxiety      Weight: 99.5kg (01-Aug-2017)    Estimated energy needs:   Estimated energy needs using 52 kg IBW:  30-35 kcal/kg (0814-9974 kcal).   1.8-2 g/kg ( g protein).  30-35 mL/kg (5630-7668 mL fluid).     Subjective:   57 s/p SBR, fistula resection, esophagojejunostomy revision w/ post-op course complicated by RTOR for distal anastomosis breakfown.  now s/p CHECO drain replacement on 8/29/17. Pt remains NPO on TPN. Pt is understanding of slow advancement process given anastomic leak. Per MD note, potential removal when output is less than 10cc/day for 2 day. Pt has no c/o GI distress. Continue on TPN as ordered.    Previous Nutrition Diagnosis:  Increased nutrient needs RT fistula & S/p surgery AEB 1.8-2 g/kg protein.     Active [ x ]  Resolved [   ]    Goal: Continue to meet % of needs via tolerated route.    Recommendations: Continue TPN as ordered.     Education: Pt is understanding of meeting nutrient needs via TPN    Risk Level: High [  X ] Moderate [   ] Low [   ].

## 2017-08-30 NOTE — PROGRESS NOTE ADULT - SUBJECTIVE AND OBJECTIVE BOX
changed dressing today  and reviewed tube check with dr faith  in lateral gutter he has 14 fr tube which I think is site of original breakdown   other drains are in same area and the mallicot is not in bowel  thus removed and packed the drain sites  the vicryl mesh has not incorporated and removed  there is small fistula in midline that Dr clarke and i could not cannulate  placed vaseline gauze and then white sponge and then black sponge  will allow to declare  will need some coverage like skin graft at certain time  difficult issue

## 2017-08-30 NOTE — PROGRESS NOTE ADULT - SUBJECTIVE AND OBJECTIVE BOX
58 yo female s/p CHECO drain replacement on 8/29/17. Patient denies discomfort in area of incision site.   The drainage color is brown. Catheter was flushed easily with 2-3cc of normal saline.  There was 57mL of drainage over the prior 24 hours.

## 2017-08-30 NOTE — ADVANCED PRACTICE NURSE CONSULT - RECOMMEDATIONS
Recommend ordering larger Coloplast fistula manager to use over wound/fistulas due to difficulty placing vac and time needed for application. Will continue to follow.

## 2017-08-31 LAB
ANION GAP SERPL CALC-SCNC: 12 MMOL/L — SIGNIFICANT CHANGE UP (ref 5–17)
BUN SERPL-MCNC: 28 MG/DL — HIGH (ref 7–23)
CALCIUM SERPL-MCNC: 9.1 MG/DL — SIGNIFICANT CHANGE UP (ref 8.4–10.5)
CHLORIDE SERPL-SCNC: 100 MMOL/L — SIGNIFICANT CHANGE UP (ref 96–108)
CO2 SERPL-SCNC: 24 MMOL/L — SIGNIFICANT CHANGE UP (ref 22–31)
CREAT SERPL-MCNC: 0.7 MG/DL — SIGNIFICANT CHANGE UP (ref 0.5–1.3)
GLUCOSE SERPL-MCNC: 135 MG/DL — HIGH (ref 70–99)
HCT VFR BLD CALC: 29 % — LOW (ref 34.5–45)
HGB BLD-MCNC: 9.2 G/DL — LOW (ref 11.5–15.5)
MAGNESIUM SERPL-MCNC: 2.2 MG/DL — SIGNIFICANT CHANGE UP (ref 1.6–2.6)
MCHC RBC-ENTMCNC: 28.8 PG — SIGNIFICANT CHANGE UP (ref 27–34)
MCHC RBC-ENTMCNC: 31.7 G/DL — LOW (ref 32–36)
MCV RBC AUTO: 90.9 FL — SIGNIFICANT CHANGE UP (ref 80–100)
PHOSPHATE SERPL-MCNC: 4.7 MG/DL — HIGH (ref 2.5–4.5)
PLATELET # BLD AUTO: 266 K/UL — SIGNIFICANT CHANGE UP (ref 150–400)
POTASSIUM SERPL-MCNC: 4 MMOL/L — SIGNIFICANT CHANGE UP (ref 3.5–5.3)
POTASSIUM SERPL-SCNC: 4 MMOL/L — SIGNIFICANT CHANGE UP (ref 3.5–5.3)
RBC # BLD: 3.19 M/UL — LOW (ref 3.8–5.2)
RBC # FLD: 18 % — HIGH (ref 10.3–16.9)
SODIUM SERPL-SCNC: 136 MMOL/L — SIGNIFICANT CHANGE UP (ref 135–145)
WBC # BLD: 7.2 K/UL — SIGNIFICANT CHANGE UP (ref 3.8–10.5)
WBC # FLD AUTO: 7.2 K/UL — SIGNIFICANT CHANGE UP (ref 3.8–10.5)

## 2017-08-31 RX ORDER — ELECTROLYTE SOLUTION,INJ
1 VIAL (ML) INTRAVENOUS
Qty: 0 | Refills: 0 | Status: DISCONTINUED | OUTPATIENT
Start: 2017-08-31 | End: 2017-08-31

## 2017-08-31 RX ORDER — HYDROMORPHONE HYDROCHLORIDE 2 MG/ML
1 INJECTION INTRAMUSCULAR; INTRAVENOUS; SUBCUTANEOUS ONCE
Qty: 0 | Refills: 0 | Status: DISCONTINUED | OUTPATIENT
Start: 2017-08-31 | End: 2017-08-31

## 2017-08-31 RX ORDER — ELECTROLYTE SOLUTION,INJ
1 VIAL (ML) INTRAVENOUS
Qty: 0 | Refills: 0 | Status: DISCONTINUED | OUTPATIENT
Start: 2017-08-31 | End: 2017-09-01

## 2017-08-31 RX ORDER — HYDROMORPHONE HYDROCHLORIDE 2 MG/ML
0.5 INJECTION INTRAMUSCULAR; INTRAVENOUS; SUBCUTANEOUS ONCE
Qty: 0 | Refills: 0 | Status: DISCONTINUED | OUTPATIENT
Start: 2017-08-31 | End: 2017-08-31

## 2017-08-31 RX ADMIN — Medication 2 MILLIGRAM(S): at 22:15

## 2017-08-31 RX ADMIN — NYSTATIN CREAM 1 APPLICATION(S): 100000 CREAM TOPICAL at 18:20

## 2017-08-31 RX ADMIN — HYDROMORPHONE HYDROCHLORIDE 1 MILLIGRAM(S): 2 INJECTION INTRAMUSCULAR; INTRAVENOUS; SUBCUTANEOUS at 00:50

## 2017-08-31 RX ADMIN — PIPERACILLIN AND TAZOBACTAM 200 GRAM(S): 4; .5 INJECTION, POWDER, LYOPHILIZED, FOR SOLUTION INTRAVENOUS at 23:59

## 2017-08-31 RX ADMIN — HYDROMORPHONE HYDROCHLORIDE 1 MILLIGRAM(S): 2 INJECTION INTRAMUSCULAR; INTRAVENOUS; SUBCUTANEOUS at 00:37

## 2017-08-31 RX ADMIN — HYDROMORPHONE HYDROCHLORIDE 0.5 MILLIGRAM(S): 2 INJECTION INTRAMUSCULAR; INTRAVENOUS; SUBCUTANEOUS at 22:15

## 2017-08-31 RX ADMIN — NYSTATIN CREAM 1 APPLICATION(S): 100000 CREAM TOPICAL at 05:29

## 2017-08-31 RX ADMIN — HYDROMORPHONE HYDROCHLORIDE 0.5 MILLIGRAM(S): 2 INJECTION INTRAMUSCULAR; INTRAVENOUS; SUBCUTANEOUS at 07:03

## 2017-08-31 RX ADMIN — Medication 25 MILLIGRAM(S): at 06:42

## 2017-08-31 RX ADMIN — PIPERACILLIN AND TAZOBACTAM 200 GRAM(S): 4; .5 INJECTION, POWDER, LYOPHILIZED, FOR SOLUTION INTRAVENOUS at 12:27

## 2017-08-31 RX ADMIN — PIPERACILLIN AND TAZOBACTAM 200 GRAM(S): 4; .5 INJECTION, POWDER, LYOPHILIZED, FOR SOLUTION INTRAVENOUS at 18:50

## 2017-08-31 RX ADMIN — HYDROMORPHONE HYDROCHLORIDE 1 MILLIGRAM(S): 2 INJECTION INTRAMUSCULAR; INTRAVENOUS; SUBCUTANEOUS at 09:30

## 2017-08-31 RX ADMIN — HYDROMORPHONE HYDROCHLORIDE 1 MILLIGRAM(S): 2 INJECTION INTRAMUSCULAR; INTRAVENOUS; SUBCUTANEOUS at 15:27

## 2017-08-31 RX ADMIN — Medication 25 MILLIGRAM(S): at 16:45

## 2017-08-31 RX ADMIN — HYDROMORPHONE HYDROCHLORIDE 1 MILLIGRAM(S): 2 INJECTION INTRAMUSCULAR; INTRAVENOUS; SUBCUTANEOUS at 13:04

## 2017-08-31 RX ADMIN — HYDROMORPHONE HYDROCHLORIDE 1 MILLIGRAM(S): 2 INJECTION INTRAMUSCULAR; INTRAVENOUS; SUBCUTANEOUS at 15:12

## 2017-08-31 RX ADMIN — Medication 10 MILLIGRAM(S): at 12:29

## 2017-08-31 RX ADMIN — HEPARIN SODIUM 5000 UNIT(S): 5000 INJECTION INTRAVENOUS; SUBCUTANEOUS at 22:15

## 2017-08-31 RX ADMIN — HYDROMORPHONE HYDROCHLORIDE 0.5 MILLIGRAM(S): 2 INJECTION INTRAMUSCULAR; INTRAVENOUS; SUBCUTANEOUS at 22:30

## 2017-08-31 RX ADMIN — HYDROMORPHONE HYDROCHLORIDE 1 MILLIGRAM(S): 2 INJECTION INTRAMUSCULAR; INTRAVENOUS; SUBCUTANEOUS at 19:19

## 2017-08-31 RX ADMIN — HYDROMORPHONE HYDROCHLORIDE 1 MILLIGRAM(S): 2 INJECTION INTRAMUSCULAR; INTRAVENOUS; SUBCUTANEOUS at 19:36

## 2017-08-31 RX ADMIN — PANTOPRAZOLE SODIUM 40 MILLIGRAM(S): 20 TABLET, DELAYED RELEASE ORAL at 12:28

## 2017-08-31 RX ADMIN — HEPARIN SODIUM 5000 UNIT(S): 5000 INJECTION INTRAVENOUS; SUBCUTANEOUS at 14:49

## 2017-08-31 RX ADMIN — HYDROMORPHONE HYDROCHLORIDE 1 MILLIGRAM(S): 2 INJECTION INTRAMUSCULAR; INTRAVENOUS; SUBCUTANEOUS at 12:49

## 2017-08-31 RX ADMIN — HYDROMORPHONE HYDROCHLORIDE 1 MILLIGRAM(S): 2 INJECTION INTRAMUSCULAR; INTRAVENOUS; SUBCUTANEOUS at 09:15

## 2017-08-31 RX ADMIN — Medication 1 APPLICATION(S): at 12:29

## 2017-08-31 RX ADMIN — PIPERACILLIN AND TAZOBACTAM 200 GRAM(S): 4; .5 INJECTION, POWDER, LYOPHILIZED, FOR SOLUTION INTRAVENOUS at 00:11

## 2017-08-31 RX ADMIN — Medication 1 EACH: at 18:50

## 2017-08-31 RX ADMIN — HEPARIN SODIUM 5000 UNIT(S): 5000 INJECTION INTRAVENOUS; SUBCUTANEOUS at 05:20

## 2017-08-31 RX ADMIN — HYDROMORPHONE HYDROCHLORIDE 1 MILLIGRAM(S): 2 INJECTION INTRAMUSCULAR; INTRAVENOUS; SUBCUTANEOUS at 04:20

## 2017-08-31 RX ADMIN — ESCITALOPRAM OXALATE 20 MILLIGRAM(S): 10 TABLET, FILM COATED ORAL at 22:15

## 2017-08-31 RX ADMIN — HYDROMORPHONE HYDROCHLORIDE 1 MILLIGRAM(S): 2 INJECTION INTRAMUSCULAR; INTRAVENOUS; SUBCUTANEOUS at 04:06

## 2017-08-31 RX ADMIN — PIPERACILLIN AND TAZOBACTAM 200 GRAM(S): 4; .5 INJECTION, POWDER, LYOPHILIZED, FOR SOLUTION INTRAVENOUS at 05:20

## 2017-08-31 NOTE — PROGRESS NOTE ADULT - SUBJECTIVE AND OBJECTIVE BOX
58 y/o F s/p LUQ abdominal drainage and surgical repair for enterocutaneous fistula. Patient in mild discomfort without localizing to catheter site.  L abdominal drainage catheter output: 140 cc/past 24 hours.  Easily flushed with 5cc NS.

## 2017-08-31 NOTE — PROGRESS NOTE ADULT - SUBJECTIVE AND OBJECTIVE BOX
O/N: T.max 100.7 VSS, BRAYDEN  8/30: Vac changed, both LLQ drains removed, Neomycin discontinued, Zosyn started O/N: T.max 100.7 VSS, BRAYDEN  8/30: Vac changed, both LLQ drains removed, Neomycin discontinued, Zosyn started       SUBJECTIVE: Patient seen and examined bedside by chief resident. Patient resting comfortably in bed.     heparin  Injectable 5000 Unit(s) SubCutaneous every 8 hours  piperacillin/tazobactam IVPB. 4.5 Gram(s) IV Intermittent every 6 hours      Vital Signs Last 24 Hrs  T(C): 36.6 (31 Aug 2017 05:46), Max: 38.2 (30 Aug 2017 18:52)  T(F): 97.9 (31 Aug 2017 05:46), Max: 100.8 (30 Aug 2017 18:52)  HR: 60 (31 Aug 2017 04:20) (60 - 90)  BP: 152/76 (31 Aug 2017 04:20) (109/69 - 152/76)  BP(mean): 83 (30 Aug 2017 17:36) (81 - 108)  RR: 18 (31 Aug 2017 04:20) (17 - 21)  SpO2: 94% (31 Aug 2017 04:20) (90% - 96%)  I&O's Detail    30 Aug 2017 07:01  -  31 Aug 2017 07:00  --------------------------------------------------------  IN:    IV PiggyBack: 200 mL    Oral Fluid: 50 mL    TPN (Total Parenteral Nutrition): 1216 mL  Total IN: 1466 mL    OUT:    Drain: 140 mL    Drain: 50 mL    VAC (Vacuum Assisted Closure) System: 400 mL    Voided: 1125 mL  Total OUT: 1715 mL    Total NET: -249 mL          General: NAD, resting comfortably in bed  Abd: soft, VAC in place         LABS:                        9.2    7.2   )-----------( 266      ( 31 Aug 2017 05:54 )             29.0     08-31    136  |  100  |  28<H>  ----------------------------<  135<H>  4.0   |  24  |  0.70    Ca    9.1      31 Aug 2017 05:54  Phos  4.7     08-31  Mg     2.2     08-31            RADIOLOGY & ADDITIONAL STUDIES:

## 2017-08-31 NOTE — ADVANCED PRACTICE NURSE CONSULT - ASSESSMENT
Ostomy appliance leaking brownish/green effluent on left lateral side. VAC dressing remains patent. Reapplied pouching system. Applied stoma powder and Cavilon liquid skin barrier to periwound, sterile gauze over fistula to absorb effluent, stoma paste and Capo ring to edges of wound and pouching system and [place] 2 piece 4 inch cut to fit pouching system; secured edges of skin barrier with Tegaderm transparent dressing.

## 2017-08-31 NOTE — ADVANCED PRACTICE NURSE CONSULT - RECOMMEDATIONS
Follow us as needed. Ostomy appliance over fistula - change 4x4 gauze over fistula every 12 hours to absorb fistula effluent.  Discussed recommendation with Millie CUETO and surgical team.

## 2017-08-31 NOTE — PROGRESS NOTE ADULT - ASSESSMENT
57 F s/p  SBR, fistula resection, esophagojejunostomy revision w/ post-op course complicated by RTOR for distal anastomosis breakdown, ATN, acute respiratory failure, & septic shock.   Neuro: Dilaudid  PRN; ENT consult re: hearing loss   CV: MAP 65  Resp: Satting well on RA  GI/FEN: NPO, TPN/lipids, Vit B12, Protonix  : Voids, hold lasix 2/2 hearing loss  Endo: ISS calcitriol 2* vit d def.   ID: neomycin (9/28-),  // D/c Perioperative Gent/Vanc, colistin (7/29-8/1), vanc (8/1-8/2), micafungin (7/29-8/7), zosyn (8/1-11), fluconazole (8/7-8/15), linezolid (7/25-8/1; 8/2-8/15), anastamotic leak: meropenem (8/11-8/19)  Heme/PPX: SCDs, SQH  Lines: L subclavian TLC (8/10--), [place] (7/24-8/8), L basilic vein PICC (7/25-8/8), L IJ TLC (7/30-8/10) Axillary to subclavian DVT chronic since January.  Drains: LLQ CHECO. LUQ CHECO, [place]ecot in enterotomy site, wound vac to midline  Wounds: Midline xiphoid to pubis incision; DTI & stage 2 pressure ulcer noted. 57 F s/p  SBR, fistula resection, esophagojejunostomy revision w/ post-op course complicated by RTOR for distal anastomosis breakdown, ATN, acute respiratory failure, & septic shock.   pain control   DVT ppx   Zosyn   Nystatin powder   NPO / TPN   OOB  PT   IS / SCDs   Vac change MWF

## 2017-09-01 LAB
ANION GAP SERPL CALC-SCNC: 12 MMOL/L — SIGNIFICANT CHANGE UP (ref 5–17)
BUN SERPL-MCNC: 26 MG/DL — HIGH (ref 7–23)
CALCIUM SERPL-MCNC: 9.5 MG/DL — SIGNIFICANT CHANGE UP (ref 8.4–10.5)
CHLORIDE SERPL-SCNC: 101 MMOL/L — SIGNIFICANT CHANGE UP (ref 96–108)
CO2 SERPL-SCNC: 24 MMOL/L — SIGNIFICANT CHANGE UP (ref 22–31)
CREAT SERPL-MCNC: 0.6 MG/DL — SIGNIFICANT CHANGE UP (ref 0.5–1.3)
GLUCOSE SERPL-MCNC: 123 MG/DL — HIGH (ref 70–99)
HCT VFR BLD CALC: 29.7 % — LOW (ref 34.5–45)
HGB BLD-MCNC: 9.2 G/DL — LOW (ref 11.5–15.5)
MAGNESIUM SERPL-MCNC: 1.8 MG/DL — SIGNIFICANT CHANGE UP (ref 1.6–2.6)
MCHC RBC-ENTMCNC: 28.9 PG — SIGNIFICANT CHANGE UP (ref 27–34)
MCHC RBC-ENTMCNC: 31 G/DL — LOW (ref 32–36)
MCV RBC AUTO: 93.4 FL — SIGNIFICANT CHANGE UP (ref 80–100)
PHOSPHATE SERPL-MCNC: 2.2 MG/DL — LOW (ref 2.5–4.5)
PLATELET # BLD AUTO: 270 K/UL — SIGNIFICANT CHANGE UP (ref 150–400)
POTASSIUM SERPL-MCNC: 4.3 MMOL/L — SIGNIFICANT CHANGE UP (ref 3.5–5.3)
POTASSIUM SERPL-SCNC: 4.3 MMOL/L — SIGNIFICANT CHANGE UP (ref 3.5–5.3)
RBC # BLD: 3.18 M/UL — LOW (ref 3.8–5.2)
RBC # FLD: 17.8 % — HIGH (ref 10.3–16.9)
SODIUM SERPL-SCNC: 137 MMOL/L — SIGNIFICANT CHANGE UP (ref 135–145)
WBC # BLD: 7.8 K/UL — SIGNIFICANT CHANGE UP (ref 3.8–10.5)
WBC # FLD AUTO: 7.8 K/UL — SIGNIFICANT CHANGE UP (ref 3.8–10.5)

## 2017-09-01 RX ORDER — HYDROMORPHONE HYDROCHLORIDE 2 MG/ML
0.5 INJECTION INTRAMUSCULAR; INTRAVENOUS; SUBCUTANEOUS ONCE
Qty: 0 | Refills: 0 | Status: DISCONTINUED | OUTPATIENT
Start: 2017-09-01 | End: 2017-09-01

## 2017-09-01 RX ORDER — HYDROMORPHONE HYDROCHLORIDE 2 MG/ML
1 INJECTION INTRAMUSCULAR; INTRAVENOUS; SUBCUTANEOUS EVERY 4 HOURS
Qty: 0 | Refills: 0 | Status: DISCONTINUED | OUTPATIENT
Start: 2017-09-02 | End: 2017-09-09

## 2017-09-01 RX ORDER — HYDROMORPHONE HYDROCHLORIDE 2 MG/ML
0.5 INJECTION INTRAMUSCULAR; INTRAVENOUS; SUBCUTANEOUS EVERY 4 HOURS
Qty: 0 | Refills: 0 | Status: DISCONTINUED | OUTPATIENT
Start: 2017-09-02 | End: 2017-09-09

## 2017-09-01 RX ORDER — LOSARTAN POTASSIUM 100 MG/1
50 TABLET, FILM COATED ORAL DAILY
Qty: 0 | Refills: 0 | Status: DISCONTINUED | OUTPATIENT
Start: 2017-09-01 | End: 2017-12-27

## 2017-09-01 RX ORDER — ELECTROLYTE SOLUTION,INJ
1 VIAL (ML) INTRAVENOUS
Qty: 0 | Refills: 0 | Status: DISCONTINUED | OUTPATIENT
Start: 2017-09-01 | End: 2017-09-01

## 2017-09-01 RX ORDER — DIAZEPAM 5 MG
2 TABLET ORAL AT BEDTIME
Qty: 0 | Refills: 0 | Status: DISCONTINUED | OUTPATIENT
Start: 2017-09-05 | End: 2017-09-12

## 2017-09-01 RX ADMIN — HYDROMORPHONE HYDROCHLORIDE 1 MILLIGRAM(S): 2 INJECTION INTRAMUSCULAR; INTRAVENOUS; SUBCUTANEOUS at 00:43

## 2017-09-01 RX ADMIN — HYDROMORPHONE HYDROCHLORIDE 1 MILLIGRAM(S): 2 INJECTION INTRAMUSCULAR; INTRAVENOUS; SUBCUTANEOUS at 05:15

## 2017-09-01 RX ADMIN — HYDROMORPHONE HYDROCHLORIDE 0.5 MILLIGRAM(S): 2 INJECTION INTRAMUSCULAR; INTRAVENOUS; SUBCUTANEOUS at 15:19

## 2017-09-01 RX ADMIN — HYDROMORPHONE HYDROCHLORIDE 0.5 MILLIGRAM(S): 2 INJECTION INTRAMUSCULAR; INTRAVENOUS; SUBCUTANEOUS at 03:35

## 2017-09-01 RX ADMIN — HYDROMORPHONE HYDROCHLORIDE 1 MILLIGRAM(S): 2 INJECTION INTRAMUSCULAR; INTRAVENOUS; SUBCUTANEOUS at 21:50

## 2017-09-01 RX ADMIN — HYDROMORPHONE HYDROCHLORIDE 1 MILLIGRAM(S): 2 INJECTION INTRAMUSCULAR; INTRAVENOUS; SUBCUTANEOUS at 17:36

## 2017-09-01 RX ADMIN — HYDROMORPHONE HYDROCHLORIDE 0.5 MILLIGRAM(S): 2 INJECTION INTRAMUSCULAR; INTRAVENOUS; SUBCUTANEOUS at 19:44

## 2017-09-01 RX ADMIN — PANTOPRAZOLE SODIUM 40 MILLIGRAM(S): 20 TABLET, DELAYED RELEASE ORAL at 12:34

## 2017-09-01 RX ADMIN — HYDROMORPHONE HYDROCHLORIDE 0.5 MILLIGRAM(S): 2 INJECTION INTRAMUSCULAR; INTRAVENOUS; SUBCUTANEOUS at 15:34

## 2017-09-01 RX ADMIN — Medication 25 MILLIGRAM(S): at 06:10

## 2017-09-01 RX ADMIN — HYDROMORPHONE HYDROCHLORIDE 1 MILLIGRAM(S): 2 INJECTION INTRAMUSCULAR; INTRAVENOUS; SUBCUTANEOUS at 13:17

## 2017-09-01 RX ADMIN — HYDROMORPHONE HYDROCHLORIDE 0.5 MILLIGRAM(S): 2 INJECTION INTRAMUSCULAR; INTRAVENOUS; SUBCUTANEOUS at 07:19

## 2017-09-01 RX ADMIN — Medication 25 MILLIGRAM(S): at 12:35

## 2017-09-01 RX ADMIN — HEPARIN SODIUM 5000 UNIT(S): 5000 INJECTION INTRAVENOUS; SUBCUTANEOUS at 05:40

## 2017-09-01 RX ADMIN — HYDROMORPHONE HYDROCHLORIDE 1 MILLIGRAM(S): 2 INJECTION INTRAMUSCULAR; INTRAVENOUS; SUBCUTANEOUS at 13:32

## 2017-09-01 RX ADMIN — PIPERACILLIN AND TAZOBACTAM 200 GRAM(S): 4; .5 INJECTION, POWDER, LYOPHILIZED, FOR SOLUTION INTRAVENOUS at 05:40

## 2017-09-01 RX ADMIN — NYSTATIN CREAM 1 APPLICATION(S): 100000 CREAM TOPICAL at 05:40

## 2017-09-01 RX ADMIN — HYDROMORPHONE HYDROCHLORIDE 1 MILLIGRAM(S): 2 INJECTION INTRAMUSCULAR; INTRAVENOUS; SUBCUTANEOUS at 21:34

## 2017-09-01 RX ADMIN — HYDROMORPHONE HYDROCHLORIDE 1 MILLIGRAM(S): 2 INJECTION INTRAMUSCULAR; INTRAVENOUS; SUBCUTANEOUS at 01:10

## 2017-09-01 RX ADMIN — HYDROMORPHONE HYDROCHLORIDE 1 MILLIGRAM(S): 2 INJECTION INTRAMUSCULAR; INTRAVENOUS; SUBCUTANEOUS at 17:21

## 2017-09-01 RX ADMIN — ESCITALOPRAM OXALATE 20 MILLIGRAM(S): 10 TABLET, FILM COATED ORAL at 21:34

## 2017-09-01 RX ADMIN — HYDROMORPHONE HYDROCHLORIDE 1 MILLIGRAM(S): 2 INJECTION INTRAMUSCULAR; INTRAVENOUS; SUBCUTANEOUS at 09:11

## 2017-09-01 RX ADMIN — HYDROMORPHONE HYDROCHLORIDE 1 MILLIGRAM(S): 2 INJECTION INTRAMUSCULAR; INTRAVENOUS; SUBCUTANEOUS at 04:57

## 2017-09-01 RX ADMIN — Medication 25 MILLIGRAM(S): at 00:08

## 2017-09-01 RX ADMIN — HYDROMORPHONE HYDROCHLORIDE 0.5 MILLIGRAM(S): 2 INJECTION INTRAMUSCULAR; INTRAVENOUS; SUBCUTANEOUS at 07:34

## 2017-09-01 RX ADMIN — HYDROMORPHONE HYDROCHLORIDE 0.5 MILLIGRAM(S): 2 INJECTION INTRAMUSCULAR; INTRAVENOUS; SUBCUTANEOUS at 03:17

## 2017-09-01 RX ADMIN — HEPARIN SODIUM 5000 UNIT(S): 5000 INJECTION INTRAVENOUS; SUBCUTANEOUS at 21:34

## 2017-09-01 RX ADMIN — LOSARTAN POTASSIUM 50 MILLIGRAM(S): 100 TABLET, FILM COATED ORAL at 12:34

## 2017-09-01 RX ADMIN — Medication 1 EACH: at 18:41

## 2017-09-01 RX ADMIN — HYDROMORPHONE HYDROCHLORIDE 0.5 MILLIGRAM(S): 2 INJECTION INTRAMUSCULAR; INTRAVENOUS; SUBCUTANEOUS at 19:24

## 2017-09-01 RX ADMIN — Medication 1 APPLICATION(S): at 12:36

## 2017-09-01 RX ADMIN — NYSTATIN CREAM 1 APPLICATION(S): 100000 CREAM TOPICAL at 17:21

## 2017-09-01 RX ADMIN — HYDROMORPHONE HYDROCHLORIDE 1 MILLIGRAM(S): 2 INJECTION INTRAMUSCULAR; INTRAVENOUS; SUBCUTANEOUS at 09:26

## 2017-09-01 RX ADMIN — HEPARIN SODIUM 5000 UNIT(S): 5000 INJECTION INTRAVENOUS; SUBCUTANEOUS at 13:17

## 2017-09-01 RX ADMIN — PIPERACILLIN AND TAZOBACTAM 200 GRAM(S): 4; .5 INJECTION, POWDER, LYOPHILIZED, FOR SOLUTION INTRAVENOUS at 17:20

## 2017-09-01 RX ADMIN — Medication 25 MILLIGRAM(S): at 18:41

## 2017-09-01 RX ADMIN — CALCITRIOL 1 MICROGRAM(S): 0.5 CAPSULE ORAL at 12:35

## 2017-09-01 RX ADMIN — PIPERACILLIN AND TAZOBACTAM 200 GRAM(S): 4; .5 INJECTION, POWDER, LYOPHILIZED, FOR SOLUTION INTRAVENOUS at 12:34

## 2017-09-01 NOTE — ADVANCED PRACTICE NURSE CONSULT - ASSESSMENT
New wound vac dressing applied with [employer] rep at bedside. Periwound folliculitis resolved.  Wound bed with granulation tissue, measures approx [id number] cm with fistula drainage noted on left side of wound and in center. Stomahesive barriers placed around periwound for protection. Fistulas  from wound with fistula isolator. Wound bed protected with stoma powder then adaptic touch silicone dressing. White foam placed over fistula in the center of wound. Black Granufoam placed on wound bed and VAC set at 125 mm/Hg. Aquacel AG placed over fistula on left lower quadrant of wound, then covered with one piece Capo pouch placed over CHECO drainage attached to wall suction on low as per Dr Brady's instructions. Will continue to monitor.

## 2017-09-01 NOTE — PROGRESS NOTE ADULT - SUBJECTIVE AND OBJECTIVE BOX
56 y/o F s/p LUQ abdominal drainage and surgical repair for enterocutaneous fistula. Patient in mild discomfort without localizing to catheter site.    L abdominal drainage catheter output: 145 cc/past 24 hours.     The drain was placed to suction by surgery team.     Spoke with Martine from surgery ([phone]) who said that the surgery team will manage the CHECO drain and we can sign off the case.  Please contact IR with any questions or concerns.

## 2017-09-01 NOTE — PROGRESS NOTE ADULT - ASSESSMENT
57 F s/p  SBR, fistula resection, esophagojejunostomy revision w/ post-op course complicated by RTOR for distal anastomosis breakdown, ATN, acute respiratory failure, & septic shock.   Neuro: Dilaudid  PRN; ENT consult re: hearing loss   CV: MAP 65  Resp: Satting well on RA  GI/FEN: NPO, TPN/lipids, Vit B12, Protonix  : Voids, hold lasix 2/2 hearing loss  Endo: ISS calcitriol 2* vit d def.   ID: neomycin (9/28-),  // D/c Perioperative Gent/Vanc, colistin (7/29-8/1), vanc (8/1-8/2), micafungin (7/29-8/7), zosyn (8/1-11), fluconazole (8/7-8/15), linezolid (7/25-8/1; 8/2-8/15), anastamotic leak: meropenem (8/11-8/19)  Heme/PPX: SCDs, SQH  Lines: L subclavian TLC (8/10--), [place] (7/24-8/8), L basilic vein PICC (7/25-8/8), L IJ TLC (7/30-8/10) Axillary to subclavian DVT chronic since January.  Drains: LLQ CHECO. LUQ CHECO, [place] in enterotomy site, wound vac to midline  Wounds: Midline xiphoid to pubis incision; DTI & stage 2 pressure ulcer noted.

## 2017-09-01 NOTE — PROGRESS NOTE ADULT - SUBJECTIVE AND OBJECTIVE BOX
O/N: VSS, BRAYDEN  8/31: c/w sips / TPN, VAC re enforced by wound care nurse, plan for vac change tmw 9/1 O/N: BRAYDEN ROBERTS  8/31: c/w sips / TPN, VAC re enforced by wound care nurse, plan for vac change tmw 9/1       SUBJECTIVE:  Flatus: [x ] YES [ ] NO             Bowel Movement: [x ] YES [ ] NO  Pain (0-10):            Pain Control Adequate: [ x] YES [ ] NO  Nausea: [ ] YES [x ] NO            Vomiting: [ ] YES [ x] NO  Diarrhea: [ ] YES [ x] NO         Constipation: [ ] YES [x ] NO     Chest Pain: [ ] YES [ x] NO    SOB:  [ ] YES [x ] NO    MEDICATIONS  (STANDING):  insulin lispro (HumaLOG) corrective regimen sliding scale   SubCutaneous every 6 hours  dextrose 5%. 1000 milliLiter(s) (50 mL/Hr) IV Continuous <Continuous>  dextrose 50% Injectable 25 Gram(s) IV Push once  sodium chloride 0.9% lock flush 20 milliLiter(s) IV Push once  cyanocobalamin Injectable 1000 MICROGram(s) SubCutaneous every 7 days  dextrose 50% Injectable 12.5 Gram(s) IV Push once  nystatin Powder 1 Application(s) Topical two times a day  pantoprazole  Injectable 40 milliGRAM(s) IV Push daily  calcitriol Injectable 1 MICROGram(s) IV Push <User Schedule>  heparin  Injectable 5000 Unit(s) SubCutaneous every 8 hours  bisacodyl Suppository 10 milliGRAM(s) Rectal daily  collagenase Ointment 1 Application(s) Topical daily  piperacillin/tazobactam IVPB. 4.5 Gram(s) IV Intermittent every 6 hours  escitalopram 20 milliGRAM(s) Oral daily  HYDROmorphone  Injectable 0.5 milliGRAM(s) IV Push once  Parenteral Nutrition - Adult 1 Each (64 mL/Hr) TPN Continuous <Continuous>    MEDICATIONS  (PRN):  ondansetron Injectable 4 milliGRAM(s) IV Push every 6 hours PRN Nausea  sodium chloride 0.9% lock flush 10 milliLiter(s) IV Push every 1 hour PRN After each medication administration  sodium chloride 0.9% lock flush 10 milliLiter(s) IV Push every 12 hours PRN Lumen of catheter NOT used  diphenhydrAMINE   Injectable 25 milliGRAM(s) IV Push every 6 hours PRN Rash and/or Itching  HYDROmorphone  Injectable 1 milliGRAM(s) IV Push every 4 hours PRN Severe Pain (7 - 10)  HYDROmorphone  Injectable 0.5 milliGRAM(s) IV Push every 4 hours PRN Moderate Pain (4 - 6)  diazepam    Tablet 2 milliGRAM(s) Oral at bedtime PRN anxiety      Vital Signs Last 24 Hrs  T(C): 36.9 (01 Sep 2017 06:03), Max: 37.5 (31 Aug 2017 17:30)  T(F): 98.5 (01 Sep 2017 06:03), Max: 99.5 (31 Aug 2017 17:30)  HR: 63 (01 Sep 2017 06:03) (60 - 92)  BP: 143/81 (01 Sep 2017 06:03) (113/79 - 143/81)  BP(mean): 103 (31 Aug 2017 16:02) (86 - 103)  RR: 18 (01 Sep 2017 06:03) (18 - 20)  SpO2: 95% (01 Sep 2017 06:03) (94% - 97%)    PHYSICAL EXAM:      Constitutional: A&Ox3      Respiratory: non labored breathing, no respiratory distress    Cardiovascular: NSR, RRR    Gastrointestinal: soft, non distended, no ttp                 Incision: vac in place;   Genitourinary: voiding     Extremities: (-) edema            I&O's Detail    31 Aug 2017 07:01  -  01 Sep 2017 07:00  --------------------------------------------------------  IN:    IV PiggyBack: 300 mL    Oral Fluid: 60 mL    TPN (Total Parenteral Nutrition): 1248 mL  Total IN: 1608 mL    OUT:    Drain: 70 mL    Drain: 145 mL    VAC (Vacuum Assisted Closure) System: 500 mL    Voided: 775 mL  Total OUT: 1490 mL    Total NET: 118 mL          LABS:                        9.2    7.2   )-----------( 266      ( 31 Aug 2017 05:54 )             29.0     08-31    136  |  100  |  28<H>  ----------------------------<  135<H>  4.0   |  24  |  0.70    Ca    9.1      31 Aug 2017 05:54  Phos  4.7     08-31  Mg     2.2     08-31            RADIOLOGY & ADDITIONAL STUDIES:

## 2017-09-01 NOTE — CHART NOTE - NSCHARTNOTEFT_GEN_A_CORE
Admitting Diagnosis:   Patient is a 57y old  Female who presents with a chief complaint of enterocutaneous fistula (24 Jul 2017 14:15)    PAST MEDICAL & SURGICAL HISTORY:  Reflux  Obesity  DVT (deep venous thrombosis): 2013 neck  Diverticulitis  Hypertension  Elective surgery: abodominal wall surgery  dec 2016  Elective surgery: NOV 2016 gastric bypass revision  Gastric bypass status for obesity: gastric sleeve, 6/2012  S/P breast biopsy: 2011, left  S/P colon resection: 2011  S/P knee surgery: repair 2009, 2011  right; left  Umbilical hernia: 2000  S/P appendectomy: 1975  S/P tonsillectomy: 1967    Current Nutrition Order:  TPN: Central Line: 254 g D, 104 g AA, 50 g lipids, & 1771 mL fluid. Providing pt with 1770 kcal, 104 g protein, & 1858 mL fluid. GIR = 1.8  Pt is sipping on sugar free Noel iced tea    GI Issues: WDL per flowsheet    Pain: Being managed    Skin Integrity: unstageable pressure ulcer on spine      Labs:   09-01    137  |  101  |  26<H>  ----------------------------<  123<H>  4.3   |  24  |  0.60    Ca    9.5      01 Sep 2017 12:33  Phos  2.2     09-01  Mg     1.8     09-01      CAPILLARY BLOOD GLUCOSE  124 (01 Sep 2017 12:18)  118 (01 Sep 2017 07:57)  147 (31 Aug 2017 22:04)  149 (31 Aug 2017 16:50)      Medications:  MEDICATIONS  (STANDING):  insulin lispro (HumaLOG) corrective regimen sliding scale   SubCutaneous every 6 hours  dextrose 5%. 1000 milliLiter(s) (50 mL/Hr) IV Continuous <Continuous>  dextrose 50% Injectable 25 Gram(s) IV Push once  sodium chloride 0.9% lock flush 20 milliLiter(s) IV Push once  cyanocobalamin Injectable 1000 MICROGram(s) SubCutaneous every 7 days  dextrose 50% Injectable 12.5 Gram(s) IV Push once  nystatin Powder 1 Application(s) Topical two times a day  pantoprazole  Injectable 40 milliGRAM(s) IV Push daily  calcitriol Injectable 1 MICROGram(s) IV Push <User Schedule>  heparin  Injectable 5000 Unit(s) SubCutaneous every 8 hours  bisacodyl Suppository 10 milliGRAM(s) Rectal daily  collagenase Ointment 1 Application(s) Topical daily  piperacillin/tazobactam IVPB. 4.5 Gram(s) IV Intermittent every 6 hours  escitalopram 20 milliGRAM(s) Oral daily  Parenteral Nutrition - Adult 1 Each (64 mL/Hr) TPN Continuous <Continuous>  losartan 50 milliGRAM(s) Oral daily    MEDICATIONS  (PRN):  ondansetron Injectable 4 milliGRAM(s) IV Push every 6 hours PRN Nausea  sodium chloride 0.9% lock flush 10 milliLiter(s) IV Push every 1 hour PRN After each medication administration  sodium chloride 0.9% lock flush 10 milliLiter(s) IV Push every 12 hours PRN Lumen of catheter NOT used  diphenhydrAMINE   Injectable 25 milliGRAM(s) IV Push every 6 hours PRN Rash and/or Itching  HYDROmorphone  Injectable 1 milliGRAM(s) IV Push every 4 hours PRN Severe Pain (7 - 10)  HYDROmorphone  Injectable 0.5 milliGRAM(s) IV Push every 4 hours PRN Moderate Pain (4 - 6)  diazepam    Tablet 2 milliGRAM(s) Oral at bedtime PRN anxiety      Weight: 99.5kg (01-Aug-2017)    Estimated energy needs:   Estimated energy needs using 52 kg IBW:  30-35 kcal/kg (4836-1237 kcal).   1.8-2 g/kg ( g protein).  30-35 mL/kg (4236-9495 mL fluid).     Subjective:   57 s/p SBR, fistula resection, esophagojejunostomy revision w/ post-op course complicated by RTOR for distal anastomosis breakfown.  now s/p CHECO drain replacement on 8/29/17. Pt remains NPO on TPN. Pt is understanding of slow advancement process given anastomic leak. Per MD note, potential removal when output is less than 10cc/day for 2 days.  Skin: stage 2 pressure ulcer. Pt has no c/o GI distress. Continue on TPN as ordered.    Previous Nutrition Diagnosis:  Increased nutrient needs RT fistula & S/p surgery AEB 1.8-2 g/kg protein.     Active [ x ]  Resolved [   ]    Goal: Continue to meet % of needs via tolerated route.    Recommendations: Continue TPN as ordered.     Education: Pt is understanding of meeting nutrient needs via TPN    Risk Level: High [  X ] Moderate [   ] Low [   ].  Recommendations:    Education:     Risk Level: High [   ] Moderate [   ] Low [   ]

## 2017-09-02 LAB
ANION GAP SERPL CALC-SCNC: 12 MMOL/L — SIGNIFICANT CHANGE UP (ref 5–17)
BUN SERPL-MCNC: 27 MG/DL — HIGH (ref 7–23)
CALCIUM SERPL-MCNC: 9.9 MG/DL — SIGNIFICANT CHANGE UP (ref 8.4–10.5)
CHLORIDE SERPL-SCNC: 100 MMOL/L — SIGNIFICANT CHANGE UP (ref 96–108)
CO2 SERPL-SCNC: 25 MMOL/L — SIGNIFICANT CHANGE UP (ref 22–31)
CREAT SERPL-MCNC: 0.7 MG/DL — SIGNIFICANT CHANGE UP (ref 0.5–1.3)
GLUCOSE SERPL-MCNC: 129 MG/DL — HIGH (ref 70–99)
HCT VFR BLD CALC: 30.7 % — LOW (ref 34.5–45)
HGB BLD-MCNC: 9.6 G/DL — LOW (ref 11.5–15.5)
MAGNESIUM SERPL-MCNC: 1.7 MG/DL — SIGNIFICANT CHANGE UP (ref 1.6–2.6)
MCHC RBC-ENTMCNC: 28.9 PG — SIGNIFICANT CHANGE UP (ref 27–34)
MCHC RBC-ENTMCNC: 31.3 G/DL — LOW (ref 32–36)
MCV RBC AUTO: 92.5 FL — SIGNIFICANT CHANGE UP (ref 80–100)
PHOSPHATE SERPL-MCNC: 2.4 MG/DL — LOW (ref 2.5–4.5)
PLATELET # BLD AUTO: 292 K/UL — SIGNIFICANT CHANGE UP (ref 150–400)
POTASSIUM SERPL-MCNC: 3.9 MMOL/L — SIGNIFICANT CHANGE UP (ref 3.5–5.3)
POTASSIUM SERPL-SCNC: 3.9 MMOL/L — SIGNIFICANT CHANGE UP (ref 3.5–5.3)
RBC # BLD: 3.32 M/UL — LOW (ref 3.8–5.2)
RBC # FLD: 17.9 % — HIGH (ref 10.3–16.9)
SODIUM SERPL-SCNC: 137 MMOL/L — SIGNIFICANT CHANGE UP (ref 135–145)
WBC # BLD: 8.9 K/UL — SIGNIFICANT CHANGE UP (ref 3.8–10.5)
WBC # FLD AUTO: 8.9 K/UL — SIGNIFICANT CHANGE UP (ref 3.8–10.5)

## 2017-09-02 RX ORDER — HYDROMORPHONE HYDROCHLORIDE 2 MG/ML
1 INJECTION INTRAMUSCULAR; INTRAVENOUS; SUBCUTANEOUS ONCE
Qty: 0 | Refills: 0 | Status: DISCONTINUED | OUTPATIENT
Start: 2017-09-02 | End: 2017-09-02

## 2017-09-02 RX ORDER — MAGNESIUM SULFATE 500 MG/ML
1 VIAL (ML) INJECTION ONCE
Qty: 0 | Refills: 0 | Status: COMPLETED | OUTPATIENT
Start: 2017-09-02 | End: 2017-09-02

## 2017-09-02 RX ORDER — HYDROMORPHONE HYDROCHLORIDE 2 MG/ML
0.5 INJECTION INTRAMUSCULAR; INTRAVENOUS; SUBCUTANEOUS ONCE
Qty: 0 | Refills: 0 | Status: DISCONTINUED | OUTPATIENT
Start: 2017-09-02 | End: 2017-09-02

## 2017-09-02 RX ORDER — ELECTROLYTE SOLUTION,INJ
1 VIAL (ML) INTRAVENOUS
Qty: 0 | Refills: 0 | Status: DISCONTINUED | OUTPATIENT
Start: 2017-09-02 | End: 2017-09-02

## 2017-09-02 RX ADMIN — Medication 25 MILLIGRAM(S): at 09:19

## 2017-09-02 RX ADMIN — LOSARTAN POTASSIUM 50 MILLIGRAM(S): 100 TABLET, FILM COATED ORAL at 06:32

## 2017-09-02 RX ADMIN — Medication 1 APPLICATION(S): at 17:43

## 2017-09-02 RX ADMIN — HYDROMORPHONE HYDROCHLORIDE 1 MILLIGRAM(S): 2 INJECTION INTRAMUSCULAR; INTRAVENOUS; SUBCUTANEOUS at 20:39

## 2017-09-02 RX ADMIN — Medication 1 EACH: at 17:36

## 2017-09-02 RX ADMIN — HEPARIN SODIUM 5000 UNIT(S): 5000 INJECTION INTRAVENOUS; SUBCUTANEOUS at 06:32

## 2017-09-02 RX ADMIN — Medication 10 MILLIGRAM(S): at 12:01

## 2017-09-02 RX ADMIN — HEPARIN SODIUM 5000 UNIT(S): 5000 INJECTION INTRAVENOUS; SUBCUTANEOUS at 15:03

## 2017-09-02 RX ADMIN — HYDROMORPHONE HYDROCHLORIDE 1 MILLIGRAM(S): 2 INJECTION INTRAMUSCULAR; INTRAVENOUS; SUBCUTANEOUS at 17:00

## 2017-09-02 RX ADMIN — PIPERACILLIN AND TAZOBACTAM 200 GRAM(S): 4; .5 INJECTION, POWDER, LYOPHILIZED, FOR SOLUTION INTRAVENOUS at 01:13

## 2017-09-02 RX ADMIN — Medication 25 MILLIGRAM(S): at 01:13

## 2017-09-02 RX ADMIN — HEPARIN SODIUM 5000 UNIT(S): 5000 INJECTION INTRAVENOUS; SUBCUTANEOUS at 21:00

## 2017-09-02 RX ADMIN — HYDROMORPHONE HYDROCHLORIDE 1 MILLIGRAM(S): 2 INJECTION INTRAMUSCULAR; INTRAVENOUS; SUBCUTANEOUS at 16:37

## 2017-09-02 RX ADMIN — PANTOPRAZOLE SODIUM 40 MILLIGRAM(S): 20 TABLET, DELAYED RELEASE ORAL at 12:00

## 2017-09-02 RX ADMIN — Medication 25 MILLIGRAM(S): at 15:35

## 2017-09-02 RX ADMIN — HYDROMORPHONE HYDROCHLORIDE 1 MILLIGRAM(S): 2 INJECTION INTRAMUSCULAR; INTRAVENOUS; SUBCUTANEOUS at 12:20

## 2017-09-02 RX ADMIN — HYDROMORPHONE HYDROCHLORIDE 1 MILLIGRAM(S): 2 INJECTION INTRAMUSCULAR; INTRAVENOUS; SUBCUTANEOUS at 07:17

## 2017-09-02 RX ADMIN — NYSTATIN CREAM 1 APPLICATION(S): 100000 CREAM TOPICAL at 06:32

## 2017-09-02 RX ADMIN — ESCITALOPRAM OXALATE 20 MILLIGRAM(S): 10 TABLET, FILM COATED ORAL at 20:39

## 2017-09-02 RX ADMIN — HYDROMORPHONE HYDROCHLORIDE 0.5 MILLIGRAM(S): 2 INJECTION INTRAMUSCULAR; INTRAVENOUS; SUBCUTANEOUS at 04:07

## 2017-09-02 RX ADMIN — NYSTATIN CREAM 1 APPLICATION(S): 100000 CREAM TOPICAL at 17:36

## 2017-09-02 RX ADMIN — PIPERACILLIN AND TAZOBACTAM 200 GRAM(S): 4; .5 INJECTION, POWDER, LYOPHILIZED, FOR SOLUTION INTRAVENOUS at 12:00

## 2017-09-02 RX ADMIN — HYDROMORPHONE HYDROCHLORIDE 1 MILLIGRAM(S): 2 INJECTION INTRAMUSCULAR; INTRAVENOUS; SUBCUTANEOUS at 21:00

## 2017-09-02 RX ADMIN — HYDROMORPHONE HYDROCHLORIDE 1 MILLIGRAM(S): 2 INJECTION INTRAMUSCULAR; INTRAVENOUS; SUBCUTANEOUS at 07:02

## 2017-09-02 RX ADMIN — HYDROMORPHONE HYDROCHLORIDE 0.5 MILLIGRAM(S): 2 INJECTION INTRAMUSCULAR; INTRAVENOUS; SUBCUTANEOUS at 04:37

## 2017-09-02 RX ADMIN — HYDROMORPHONE HYDROCHLORIDE 1 MILLIGRAM(S): 2 INJECTION INTRAMUSCULAR; INTRAVENOUS; SUBCUTANEOUS at 18:30

## 2017-09-02 RX ADMIN — PIPERACILLIN AND TAZOBACTAM 200 GRAM(S): 4; .5 INJECTION, POWDER, LYOPHILIZED, FOR SOLUTION INTRAVENOUS at 17:36

## 2017-09-02 RX ADMIN — Medication 100 GRAM(S): at 08:52

## 2017-09-02 RX ADMIN — PIPERACILLIN AND TAZOBACTAM 200 GRAM(S): 4; .5 INJECTION, POWDER, LYOPHILIZED, FOR SOLUTION INTRAVENOUS at 06:31

## 2017-09-02 RX ADMIN — HYDROMORPHONE HYDROCHLORIDE 1 MILLIGRAM(S): 2 INJECTION INTRAMUSCULAR; INTRAVENOUS; SUBCUTANEOUS at 17:57

## 2017-09-02 RX ADMIN — HYDROMORPHONE HYDROCHLORIDE 1 MILLIGRAM(S): 2 INJECTION INTRAMUSCULAR; INTRAVENOUS; SUBCUTANEOUS at 11:58

## 2017-09-02 NOTE — PROGRESS NOTE ADULT - SUBJECTIVE AND OBJECTIVE BOX
O/N: VSS, BRAYDEN  9/1: continue TPN; vac changed- CHECO drain to suction O/N: VSS, BRAYDEN  9/1: continue TPN; vac changed- CHECO drain to suction   STATUS POST:       SUBJECTIVE: Patient seen and examined bedside. No acute complaints, pain well controlled. Continues to pass flatus, +bm. Denies CP, SOB, n,v, fever/ chills.   heparin  Injectable 5000 Unit(s) SubCutaneous every 8 hours  piperacillin/tazobactam IVPB. 4.5 Gram(s) IV Intermittent every 6 hours  losartan 50 milliGRAM(s) Oral daily      Vital Signs Last 24 Hrs  T(C): 36.9 (02 Sep 2017 06:11), Max: 37.3 (01 Sep 2017 13:35)  T(F): 98.5 (02 Sep 2017 06:11), Max: 99.1 (01 Sep 2017 13:35)  HR: 75 (02 Sep 2017 06:11) (52 - 75)  BP: 119/82 (02 Sep 2017 06:11) (119/82 - 160/99)  BP(mean): --  RR: 16 (02 Sep 2017 06:11) (16 - 18)  SpO2: 96% (02 Sep 2017 06:11) (94% - 97%)  I&O's Detail    01 Sep 2017 07:01  -  02 Sep 2017 07:00  --------------------------------------------------------  IN:    Solution: 200 mL    TPN (Total Parenteral Nutrition): 768 mL  Total IN: 968 mL    OUT:    Drain: 150 mL    VAC (Vacuum Assisted Closure) System: 180 mL    Voided: 1997 mL  Total OUT: 2327 mL    Total NET: -1359 mL          General: NAD, resting comfortably in bed  Pulm: Nonlabored breathing, no respiratory distress  Abd: soft, non distended, no ttp,  vac in place  Extrem: WWP, no edema, SCDs in place        LABS:                        9.6    8.9   )-----------( 292      ( 02 Sep 2017 06:40 )             30.7     09-02    137  |  100  |  27<H>  ----------------------------<  129<H>  3.9   |  25  |  0.70    Ca    9.9      02 Sep 2017 06:40  Phos  2.4     09-02  Mg     1.7     09-02            RADIOLOGY & ADDITIONAL STUDIES:

## 2017-09-02 NOTE — PROVIDER CONTACT NOTE (OTHER) - BACKGROUND
pt with EC fistula to ostomy bag LLQ abdomen and midline abdomen wound VAC therapy pt with fistula to ostomy bag LLQ abdomen and midline abdomen wound VAC therapy

## 2017-09-02 NOTE — PROGRESS NOTE ADULT - ASSESSMENT
57 F s/p  SBR, fistula resection, esophagojejunostomy revision w/ post-op course complicated by RTOR for distal anastomosis breakdown, ATN, acute respiratory failure, & septic shock.   Neuro: Dilaudid  PRN; ENT consult re: hearing loss   CV: MAP 65  Resp: Satting well on RA  GI/FEN: NPO, TPN/lipids, Vit B12, Protonix  : Voids, hold lasix 2/2 hearing loss  Endo: ISS calcitriol 2* vit d def.   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  Drains: LLQ CHECO. LUQ CHECO, Malecot in enterotomy site, wound vac to midline  Wounds: Midline xiphoid to pubis incision; DTI & stage 2 pressure ulcer noted. 57 F s/p  SBR, fistula resection, esophagojejunostomy revision w/ post-op course complicated by RTOR for distal anastomosis breakdown, ATN, acute respiratory failure, & septic shock.   pain control   DVT ppx   Zosyn   Nystatin powder   NPO / TPN   OOB  PT   IS / SCDs   Vac change MWF

## 2017-09-03 LAB
ANION GAP SERPL CALC-SCNC: 11 MMOL/L — SIGNIFICANT CHANGE UP (ref 5–17)
BUN SERPL-MCNC: 27 MG/DL — HIGH (ref 7–23)
CALCIUM SERPL-MCNC: 9.2 MG/DL — SIGNIFICANT CHANGE UP (ref 8.4–10.5)
CHLORIDE SERPL-SCNC: 101 MMOL/L — SIGNIFICANT CHANGE UP (ref 96–108)
CO2 SERPL-SCNC: 24 MMOL/L — SIGNIFICANT CHANGE UP (ref 22–31)
CREAT SERPL-MCNC: 0.8 MG/DL — SIGNIFICANT CHANGE UP (ref 0.5–1.3)
GLUCOSE SERPL-MCNC: 153 MG/DL — HIGH (ref 70–99)
HCT VFR BLD CALC: 28.6 % — LOW (ref 34.5–45)
HGB BLD-MCNC: 9.1 G/DL — LOW (ref 11.5–15.5)
MAGNESIUM SERPL-MCNC: 1.8 MG/DL — SIGNIFICANT CHANGE UP (ref 1.6–2.6)
MCHC RBC-ENTMCNC: 29.2 PG — SIGNIFICANT CHANGE UP (ref 27–34)
MCHC RBC-ENTMCNC: 31.8 G/DL — LOW (ref 32–36)
MCV RBC AUTO: 91.7 FL — SIGNIFICANT CHANGE UP (ref 80–100)
PHOSPHATE SERPL-MCNC: 3 MG/DL — SIGNIFICANT CHANGE UP (ref 2.5–4.5)
PLATELET # BLD AUTO: 290 K/UL — SIGNIFICANT CHANGE UP (ref 150–400)
POTASSIUM SERPL-MCNC: 3.9 MMOL/L — SIGNIFICANT CHANGE UP (ref 3.5–5.3)
POTASSIUM SERPL-SCNC: 3.9 MMOL/L — SIGNIFICANT CHANGE UP (ref 3.5–5.3)
RBC # BLD: 3.12 M/UL — LOW (ref 3.8–5.2)
RBC # FLD: 18.2 % — HIGH (ref 10.3–16.9)
SODIUM SERPL-SCNC: 136 MMOL/L — SIGNIFICANT CHANGE UP (ref 135–145)
WBC # BLD: 8 K/UL — SIGNIFICANT CHANGE UP (ref 3.8–10.5)
WBC # FLD AUTO: 8 K/UL — SIGNIFICANT CHANGE UP (ref 3.8–10.5)

## 2017-09-03 RX ORDER — DIPHENHYDRAMINE HCL 50 MG
25 CAPSULE ORAL ONCE
Qty: 0 | Refills: 0 | Status: COMPLETED | OUTPATIENT
Start: 2017-09-03 | End: 2017-09-03

## 2017-09-03 RX ORDER — I.V. FAT EMULSION 20 G/100ML
50 EMULSION INTRAVENOUS
Qty: 0 | Refills: 0 | Status: DISCONTINUED | OUTPATIENT
Start: 2017-09-03 | End: 2017-09-03

## 2017-09-03 RX ORDER — ELECTROLYTE SOLUTION,INJ
1 VIAL (ML) INTRAVENOUS
Qty: 0 | Refills: 0 | Status: DISCONTINUED | OUTPATIENT
Start: 2017-09-03 | End: 2017-09-03

## 2017-09-03 RX ADMIN — ESCITALOPRAM OXALATE 20 MILLIGRAM(S): 10 TABLET, FILM COATED ORAL at 22:13

## 2017-09-03 RX ADMIN — Medication 25 MILLIGRAM(S): at 19:30

## 2017-09-03 RX ADMIN — HYDROMORPHONE HYDROCHLORIDE 1 MILLIGRAM(S): 2 INJECTION INTRAMUSCULAR; INTRAVENOUS; SUBCUTANEOUS at 18:05

## 2017-09-03 RX ADMIN — HYDROMORPHONE HYDROCHLORIDE 1 MILLIGRAM(S): 2 INJECTION INTRAMUSCULAR; INTRAVENOUS; SUBCUTANEOUS at 05:50

## 2017-09-03 RX ADMIN — Medication 25 MILLIGRAM(S): at 23:52

## 2017-09-03 RX ADMIN — HYDROMORPHONE HYDROCHLORIDE 1 MILLIGRAM(S): 2 INJECTION INTRAMUSCULAR; INTRAVENOUS; SUBCUTANEOUS at 22:15

## 2017-09-03 RX ADMIN — PIPERACILLIN AND TAZOBACTAM 200 GRAM(S): 4; .5 INJECTION, POWDER, LYOPHILIZED, FOR SOLUTION INTRAVENOUS at 12:15

## 2017-09-03 RX ADMIN — PIPERACILLIN AND TAZOBACTAM 200 GRAM(S): 4; .5 INJECTION, POWDER, LYOPHILIZED, FOR SOLUTION INTRAVENOUS at 17:48

## 2017-09-03 RX ADMIN — HEPARIN SODIUM 5000 UNIT(S): 5000 INJECTION INTRAVENOUS; SUBCUTANEOUS at 05:32

## 2017-09-03 RX ADMIN — Medication 25 MILLIGRAM(S): at 03:22

## 2017-09-03 RX ADMIN — Medication 1 EACH: at 17:57

## 2017-09-03 RX ADMIN — HYDROMORPHONE HYDROCHLORIDE 1 MILLIGRAM(S): 2 INJECTION INTRAMUSCULAR; INTRAVENOUS; SUBCUTANEOUS at 09:50

## 2017-09-03 RX ADMIN — Medication 1 APPLICATION(S): at 12:17

## 2017-09-03 RX ADMIN — HYDROMORPHONE HYDROCHLORIDE 1 MILLIGRAM(S): 2 INJECTION INTRAMUSCULAR; INTRAVENOUS; SUBCUTANEOUS at 05:33

## 2017-09-03 RX ADMIN — Medication 25 MILLIGRAM(S): at 15:17

## 2017-09-03 RX ADMIN — HYDROMORPHONE HYDROCHLORIDE 1 MILLIGRAM(S): 2 INJECTION INTRAMUSCULAR; INTRAVENOUS; SUBCUTANEOUS at 13:36

## 2017-09-03 RX ADMIN — PIPERACILLIN AND TAZOBACTAM 200 GRAM(S): 4; .5 INJECTION, POWDER, LYOPHILIZED, FOR SOLUTION INTRAVENOUS at 00:27

## 2017-09-03 RX ADMIN — PIPERACILLIN AND TAZOBACTAM 200 GRAM(S): 4; .5 INJECTION, POWDER, LYOPHILIZED, FOR SOLUTION INTRAVENOUS at 23:54

## 2017-09-03 RX ADMIN — HYDROMORPHONE HYDROCHLORIDE 1 MILLIGRAM(S): 2 INJECTION INTRAMUSCULAR; INTRAVENOUS; SUBCUTANEOUS at 01:27

## 2017-09-03 RX ADMIN — HYDROMORPHONE HYDROCHLORIDE 1 MILLIGRAM(S): 2 INJECTION INTRAMUSCULAR; INTRAVENOUS; SUBCUTANEOUS at 17:49

## 2017-09-03 RX ADMIN — HYDROMORPHONE HYDROCHLORIDE 1 MILLIGRAM(S): 2 INJECTION INTRAMUSCULAR; INTRAVENOUS; SUBCUTANEOUS at 13:50

## 2017-09-03 RX ADMIN — HYDROMORPHONE HYDROCHLORIDE 1 MILLIGRAM(S): 2 INJECTION INTRAMUSCULAR; INTRAVENOUS; SUBCUTANEOUS at 01:45

## 2017-09-03 RX ADMIN — PIPERACILLIN AND TAZOBACTAM 200 GRAM(S): 4; .5 INJECTION, POWDER, LYOPHILIZED, FOR SOLUTION INTRAVENOUS at 06:00

## 2017-09-03 RX ADMIN — I.V. FAT EMULSION 20.83 GRAM(S): 20 EMULSION INTRAVENOUS at 22:13

## 2017-09-03 RX ADMIN — Medication 25 MILLIGRAM(S): at 09:17

## 2017-09-03 RX ADMIN — NYSTATIN CREAM 1 APPLICATION(S): 100000 CREAM TOPICAL at 05:33

## 2017-09-03 RX ADMIN — HYDROMORPHONE HYDROCHLORIDE 1 MILLIGRAM(S): 2 INJECTION INTRAMUSCULAR; INTRAVENOUS; SUBCUTANEOUS at 09:35

## 2017-09-03 RX ADMIN — HEPARIN SODIUM 5000 UNIT(S): 5000 INJECTION INTRAVENOUS; SUBCUTANEOUS at 13:37

## 2017-09-03 RX ADMIN — HEPARIN SODIUM 5000 UNIT(S): 5000 INJECTION INTRAVENOUS; SUBCUTANEOUS at 22:13

## 2017-09-03 RX ADMIN — Medication 2 MILLIGRAM(S): at 00:21

## 2017-09-03 RX ADMIN — NYSTATIN CREAM 1 APPLICATION(S): 100000 CREAM TOPICAL at 17:48

## 2017-09-03 RX ADMIN — LOSARTAN POTASSIUM 50 MILLIGRAM(S): 100 TABLET, FILM COATED ORAL at 05:32

## 2017-09-03 RX ADMIN — HYDROMORPHONE HYDROCHLORIDE 1 MILLIGRAM(S): 2 INJECTION INTRAMUSCULAR; INTRAVENOUS; SUBCUTANEOUS at 22:30

## 2017-09-03 RX ADMIN — PANTOPRAZOLE SODIUM 40 MILLIGRAM(S): 20 TABLET, DELAYED RELEASE ORAL at 12:15

## 2017-09-03 NOTE — PROGRESS NOTE ADULT - ASSESSMENT
57 F s/p  SBR, fistula resection, esophagojejunostomy revision w/ post-op course complicated by RTOR for distal anastomosis breakdown, ATN, acute respiratory failure, & septic shock.   Neuro: Dilaudid  PRN; ENT consult re: hearing loss   CV: MAP 65  Resp: Satting well on RA  GI/FEN: NPO, TPN/lipids, Vit B12, Protonix  : Voids, hold lasix 2/2 hearing loss  Endo: ISS calcitriol 2* vit d def.   ID: neomycin (9/28-),  // D/c Perioperative Gent/Vanc, colistin (7/29-8/1), vanc (8/1-8/2), micafungin (7/29-8/7), zosyn (8/1-11), fluconazole (8/7-8/15), linezolid (7/25-8/1; 8/2-8/15), anastamotic leak: meropenem (8/11-8/19)  Heme/PPX: SCDs, SQH  Lines: L subclavian TLC (8/10--), Ramona (7/24-8/8), L basilic vein PICC (7/25-8/8), L IJ TLC (7/30-8/10) Axillary to subclavian DVT chronic since January.  Drains: LLQ CHECO. LUQ CHECO, Malecot in enterotomy site, wound vac to midline

## 2017-09-03 NOTE — PROGRESS NOTE ADULT - SUBJECTIVE AND OBJECTIVE BOX
O/N: BRAYDEN  9/2: Vac found to be leaking. Removed vac and ostomy appliance, red ruber catheter x2 into limbs of bypass and vac replaced. RR cathx2 draining into ostomy. O/N: BRAYDEN  9/2: Vac found to be leaking. Removed vac and ostomy appliance, red ruber catheter x2 into limbs of bypass and vac replaced. RR cathx2 draining into ostomy.         SUBJECTIVE:  Flatus: [x ] YES [ ] NO             Bowel Movement: [x ] YES [ ] NO {ostomy}  Pain (0-10):            Pain Control Adequate: [x ] YES [ ] NO  Nausea: [ ] YES [x ] NO            Vomiting: [ ] YES [x ] NO  Diarrhea: [ ] YES [x ] NO         Constipation: [ ] YES [x ] NO     Chest Pain: [ ] YES [x ] NO    SOB:  [ ] YES [x ] NO    MEDICATIONS  (STANDING):  insulin lispro (HumaLOG) corrective regimen sliding scale   SubCutaneous every 6 hours  dextrose 5%. 1000 milliLiter(s) (50 mL/Hr) IV Continuous <Continuous>  dextrose 50% Injectable 25 Gram(s) IV Push once  sodium chloride 0.9% lock flush 20 milliLiter(s) IV Push once  cyanocobalamin Injectable 1000 MICROGram(s) SubCutaneous every 7 days  dextrose 50% Injectable 12.5 Gram(s) IV Push once  nystatin Powder 1 Application(s) Topical two times a day  pantoprazole  Injectable 40 milliGRAM(s) IV Push daily  calcitriol Injectable 1 MICROGram(s) IV Push <User Schedule>  heparin  Injectable 5000 Unit(s) SubCutaneous every 8 hours  bisacodyl Suppository 10 milliGRAM(s) Rectal daily  collagenase Ointment 1 Application(s) Topical daily  piperacillin/tazobactam IVPB. 4.5 Gram(s) IV Intermittent every 6 hours  escitalopram 20 milliGRAM(s) Oral daily  losartan 50 milliGRAM(s) Oral daily  Parenteral Nutrition - Adult 1 Each (64 mL/Hr) TPN Continuous <Continuous>    MEDICATIONS  (PRN):  ondansetron Injectable 4 milliGRAM(s) IV Push every 6 hours PRN Nausea  sodium chloride 0.9% lock flush 10 milliLiter(s) IV Push every 1 hour PRN After each medication administration  sodium chloride 0.9% lock flush 10 milliLiter(s) IV Push every 12 hours PRN Lumen of catheter NOT used  diphenhydrAMINE   Injectable 25 milliGRAM(s) IV Push every 6 hours PRN Rash and/or Itching  diazepam    Tablet 2 milliGRAM(s) Oral at bedtime PRN anxiety  HYDROmorphone  Injectable 0.5 milliGRAM(s) IV Push every 4 hours PRN Moderate Pain (4 - 6)  HYDROmorphone  Injectable 1 milliGRAM(s) IV Push every 4 hours PRN Severe Pain (7 - 10)      Vital Signs Last 24 Hrs  T(C): 36.4 (03 Sep 2017 08:30), Max: 37.3 (02 Sep 2017 13:57)  T(F): 97.6 (03 Sep 2017 08:30), Max: 99.1 (02 Sep 2017 13:57)  HR: 57 (03 Sep 2017 08:30) (56 - 66)  BP: 125/78 (03 Sep 2017 08:30) (121/75 - 146/84)  BP(mean): --  RR: 17 (03 Sep 2017 08:30) (16 - 17)  SpO2: 97% (03 Sep 2017 08:30) (95% - 98%)    PHYSICAL EXAM:      Constitutional: A&Ox3    Respiratory: non labored breathing, no respiratory distress    Cardiovascular: NSR, RRR    Gastrointestinal: Soft, ND,NT, midline Vac functioning well, ostomy w/ output.                      Genitourinary: voiding    Extremities: (-) edema                  I&O's Detail    02 Sep 2017 07:01  -  03 Sep 2017 07:00  --------------------------------------------------------  IN:    Solution: 400 mL    TPN (Total Parenteral Nutrition): 1472 mL  Total IN: 1872 mL    OUT:    Drain: 50 mL    Drain: 10 mL    VAC (Vacuum Assisted Closure) System: 200 mL    Voided: 3000 mL  Total OUT: 3260 mL    Total NET: -1388 mL      03 Sep 2017 07:01  -  03 Sep 2017 09:35  --------------------------------------------------------  IN:    TPN (Total Parenteral Nutrition): 192 mL  Total IN: 192 mL    OUT:  Total OUT: 0 mL    Total NET: 192 mL          LABS:                        9.1    8.0   )-----------( 290      ( 03 Sep 2017 07:04 )             28.6     09-03    136  |  101  |  27<H>  ----------------------------<  153<H>  3.9   |  24  |  0.80    Ca    9.2      03 Sep 2017 07:04  Phos  3.0     09-03  Mg     1.8     09-03            RADIOLOGY & ADDITIONAL STUDIES:

## 2017-09-04 LAB
ANION GAP SERPL CALC-SCNC: 11 MMOL/L — SIGNIFICANT CHANGE UP (ref 5–17)
BUN SERPL-MCNC: 27 MG/DL — HIGH (ref 7–23)
CALCIUM SERPL-MCNC: 9.5 MG/DL — SIGNIFICANT CHANGE UP (ref 8.4–10.5)
CHLORIDE SERPL-SCNC: 104 MMOL/L — SIGNIFICANT CHANGE UP (ref 96–108)
CO2 SERPL-SCNC: 24 MMOL/L — SIGNIFICANT CHANGE UP (ref 22–31)
CREAT SERPL-MCNC: 0.7 MG/DL — SIGNIFICANT CHANGE UP (ref 0.5–1.3)
GLUCOSE SERPL-MCNC: 134 MG/DL — HIGH (ref 70–99)
HCT VFR BLD CALC: 30.8 % — LOW (ref 34.5–45)
HGB BLD-MCNC: 9.4 G/DL — LOW (ref 11.5–15.5)
MAGNESIUM SERPL-MCNC: 1.9 MG/DL — SIGNIFICANT CHANGE UP (ref 1.6–2.6)
MCHC RBC-ENTMCNC: 28.9 PG — SIGNIFICANT CHANGE UP (ref 27–34)
MCHC RBC-ENTMCNC: 30.5 G/DL — LOW (ref 32–36)
MCV RBC AUTO: 94.8 FL — SIGNIFICANT CHANGE UP (ref 80–100)
PHOSPHATE SERPL-MCNC: 2.6 MG/DL — SIGNIFICANT CHANGE UP (ref 2.5–4.5)
PLATELET # BLD AUTO: 307 K/UL — SIGNIFICANT CHANGE UP (ref 150–400)
POTASSIUM SERPL-MCNC: 4 MMOL/L — SIGNIFICANT CHANGE UP (ref 3.5–5.3)
POTASSIUM SERPL-SCNC: 4 MMOL/L — SIGNIFICANT CHANGE UP (ref 3.5–5.3)
RBC # BLD: 3.25 M/UL — LOW (ref 3.8–5.2)
RBC # FLD: 18.5 % — HIGH (ref 10.3–16.9)
SODIUM SERPL-SCNC: 139 MMOL/L — SIGNIFICANT CHANGE UP (ref 135–145)
WBC # BLD: 10.1 K/UL — SIGNIFICANT CHANGE UP (ref 3.8–10.5)
WBC # FLD AUTO: 10.1 K/UL — SIGNIFICANT CHANGE UP (ref 3.8–10.5)

## 2017-09-04 RX ORDER — DIPHENHYDRAMINE HCL 50 MG
25 CAPSULE ORAL ONCE
Qty: 0 | Refills: 0 | Status: COMPLETED | OUTPATIENT
Start: 2017-09-04 | End: 2017-09-04

## 2017-09-04 RX ORDER — ELECTROLYTE SOLUTION,INJ
1 VIAL (ML) INTRAVENOUS
Qty: 0 | Refills: 0 | Status: DISCONTINUED | OUTPATIENT
Start: 2017-09-04 | End: 2017-09-04

## 2017-09-04 RX ORDER — HYDROMORPHONE HYDROCHLORIDE 2 MG/ML
0.5 INJECTION INTRAMUSCULAR; INTRAVENOUS; SUBCUTANEOUS ONCE
Qty: 0 | Refills: 0 | Status: DISCONTINUED | OUTPATIENT
Start: 2017-09-04 | End: 2017-09-04

## 2017-09-04 RX ORDER — I.V. FAT EMULSION 20 G/100ML
50 EMULSION INTRAVENOUS
Qty: 0 | Refills: 0 | Status: DISCONTINUED | OUTPATIENT
Start: 2017-09-04 | End: 2017-09-04

## 2017-09-04 RX ADMIN — Medication 1 APPLICATION(S): at 12:11

## 2017-09-04 RX ADMIN — HYDROMORPHONE HYDROCHLORIDE 1 MILLIGRAM(S): 2 INJECTION INTRAMUSCULAR; INTRAVENOUS; SUBCUTANEOUS at 13:00

## 2017-09-04 RX ADMIN — HEPARIN SODIUM 5000 UNIT(S): 5000 INJECTION INTRAVENOUS; SUBCUTANEOUS at 06:15

## 2017-09-04 RX ADMIN — CALCITRIOL 1 MICROGRAM(S): 0.5 CAPSULE ORAL at 12:11

## 2017-09-04 RX ADMIN — Medication 25 MILLIGRAM(S): at 14:08

## 2017-09-04 RX ADMIN — HYDROMORPHONE HYDROCHLORIDE 1 MILLIGRAM(S): 2 INJECTION INTRAMUSCULAR; INTRAVENOUS; SUBCUTANEOUS at 09:00

## 2017-09-04 RX ADMIN — HEPARIN SODIUM 5000 UNIT(S): 5000 INJECTION INTRAVENOUS; SUBCUTANEOUS at 21:27

## 2017-09-04 RX ADMIN — PANTOPRAZOLE SODIUM 40 MILLIGRAM(S): 20 TABLET, DELAYED RELEASE ORAL at 12:11

## 2017-09-04 RX ADMIN — HEPARIN SODIUM 5000 UNIT(S): 5000 INJECTION INTRAVENOUS; SUBCUTANEOUS at 14:08

## 2017-09-04 RX ADMIN — HYDROMORPHONE HYDROCHLORIDE 1 MILLIGRAM(S): 2 INJECTION INTRAMUSCULAR; INTRAVENOUS; SUBCUTANEOUS at 17:04

## 2017-09-04 RX ADMIN — HYDROMORPHONE HYDROCHLORIDE 0.5 MILLIGRAM(S): 2 INJECTION INTRAMUSCULAR; INTRAVENOUS; SUBCUTANEOUS at 15:42

## 2017-09-04 RX ADMIN — HYDROMORPHONE HYDROCHLORIDE 1 MILLIGRAM(S): 2 INJECTION INTRAMUSCULAR; INTRAVENOUS; SUBCUTANEOUS at 22:27

## 2017-09-04 RX ADMIN — HYDROMORPHONE HYDROCHLORIDE 1 MILLIGRAM(S): 2 INJECTION INTRAMUSCULAR; INTRAVENOUS; SUBCUTANEOUS at 04:11

## 2017-09-04 RX ADMIN — PIPERACILLIN AND TAZOBACTAM 200 GRAM(S): 4; .5 INJECTION, POWDER, LYOPHILIZED, FOR SOLUTION INTRAVENOUS at 06:15

## 2017-09-04 RX ADMIN — LOSARTAN POTASSIUM 50 MILLIGRAM(S): 100 TABLET, FILM COATED ORAL at 06:15

## 2017-09-04 RX ADMIN — Medication 25 MILLIGRAM(S): at 17:56

## 2017-09-04 RX ADMIN — HYDROMORPHONE HYDROCHLORIDE 0.5 MILLIGRAM(S): 2 INJECTION INTRAMUSCULAR; INTRAVENOUS; SUBCUTANEOUS at 15:55

## 2017-09-04 RX ADMIN — I.V. FAT EMULSION 20.8 GRAM(S): 20 EMULSION INTRAVENOUS at 22:39

## 2017-09-04 RX ADMIN — PIPERACILLIN AND TAZOBACTAM 200 GRAM(S): 4; .5 INJECTION, POWDER, LYOPHILIZED, FOR SOLUTION INTRAVENOUS at 12:10

## 2017-09-04 RX ADMIN — Medication 2 MILLIGRAM(S): at 03:02

## 2017-09-04 RX ADMIN — Medication 1 EACH: at 17:15

## 2017-09-04 RX ADMIN — NYSTATIN CREAM 1 APPLICATION(S): 100000 CREAM TOPICAL at 17:04

## 2017-09-04 RX ADMIN — HYDROMORPHONE HYDROCHLORIDE 1 MILLIGRAM(S): 2 INJECTION INTRAMUSCULAR; INTRAVENOUS; SUBCUTANEOUS at 17:15

## 2017-09-04 RX ADMIN — Medication 25 MILLIGRAM(S): at 06:15

## 2017-09-04 RX ADMIN — HYDROMORPHONE HYDROCHLORIDE 1 MILLIGRAM(S): 2 INJECTION INTRAMUSCULAR; INTRAVENOUS; SUBCUTANEOUS at 04:30

## 2017-09-04 RX ADMIN — HYDROMORPHONE HYDROCHLORIDE 1 MILLIGRAM(S): 2 INJECTION INTRAMUSCULAR; INTRAVENOUS; SUBCUTANEOUS at 12:48

## 2017-09-04 RX ADMIN — ESCITALOPRAM OXALATE 20 MILLIGRAM(S): 10 TABLET, FILM COATED ORAL at 21:27

## 2017-09-04 RX ADMIN — HYDROMORPHONE HYDROCHLORIDE 1 MILLIGRAM(S): 2 INJECTION INTRAMUSCULAR; INTRAVENOUS; SUBCUTANEOUS at 08:48

## 2017-09-04 RX ADMIN — HYDROMORPHONE HYDROCHLORIDE 1 MILLIGRAM(S): 2 INJECTION INTRAMUSCULAR; INTRAVENOUS; SUBCUTANEOUS at 21:27

## 2017-09-04 RX ADMIN — Medication 25 MILLIGRAM(S): at 10:07

## 2017-09-04 RX ADMIN — NYSTATIN CREAM 1 APPLICATION(S): 100000 CREAM TOPICAL at 06:15

## 2017-09-04 RX ADMIN — PIPERACILLIN AND TAZOBACTAM 200 GRAM(S): 4; .5 INJECTION, POWDER, LYOPHILIZED, FOR SOLUTION INTRAVENOUS at 17:04

## 2017-09-04 NOTE — CHART NOTE - NSCHARTNOTEFT_GEN_A_CORE
Admitting Diagnosis:   Patient is a 57y old  Female who presents with a chief complaint of enterocutaneous fistula (24 Jul 2017 14:15) 8/10: w/leak from previous anastamosis site on L side, mid abdomen malecot drain placed in enterotomy      PAST MEDICAL & SURGICAL HISTORY:  Reflux  Obesity  DVT (deep venous thrombosis): 2013 neck  Diverticulitis  Hypertension  Elective surgery: abodominal wall surgery  dec 2016  Elective surgery: NOV 2016 gastric bypass revision  Gastric bypass status for obesity: gastric sleeve, 6/2012  S/P breast biopsy: 2011, left  S/P colon resection: 2011  S/P knee surgery: repair 2009, 2011  right; left  Umbilical hernia: 2000  S/P appendectomy: 1975  S/P tonsillectomy: 1967      Current Nutrition Order: TPN with 104 g aa, 254g dex, and 50g lipid solution provides 416 kcal protein shailesh, 863.6 CHO calories and 500kcal fat calories.       PO Intake: Good (%) [   ]  Fair (50-75%) [   ] Poor (<25%) [   ]    GI Issues: none, BM last night    Pain: manageable    Skin Integrity: unstageable-- sacrum    Labs:   09-04    139  |  104  |  27<H>  ----------------------------<  134<H>  4.0   |  24  |  0.70    Ca    9.5      04 Sep 2017 06:50  Phos  2.6     09-04  Mg     1.9     09-04      CAPILLARY BLOOD GLUCOSE  128 (04 Sep 2017 12:07)  138 (04 Sep 2017 00:18)  125 (03 Sep 2017 20:15)  115 (03 Sep 2017 16:22)          Medications:  MEDICATIONS  (STANDING):  insulin lispro (HumaLOG) corrective regimen sliding scale   SubCutaneous every 6 hours  dextrose 5%. 1000 milliLiter(s) (50 mL/Hr) IV Continuous <Continuous>  dextrose 50% Injectable 25 Gram(s) IV Push once  sodium chloride 0.9% lock flush 20 milliLiter(s) IV Push once  cyanocobalamin Injectable 1000 MICROGram(s) SubCutaneous every 7 days  dextrose 50% Injectable 12.5 Gram(s) IV Push once  nystatin Powder 1 Application(s) Topical two times a day  pantoprazole  Injectable 40 milliGRAM(s) IV Push daily  calcitriol Injectable 1 MICROGram(s) IV Push <User Schedule>  heparin  Injectable 5000 Unit(s) SubCutaneous every 8 hours  bisacodyl Suppository 10 milliGRAM(s) Rectal daily  collagenase Ointment 1 Application(s) Topical daily  piperacillin/tazobactam IVPB. 4.5 Gram(s) IV Intermittent every 6 hours  escitalopram 20 milliGRAM(s) Oral daily  losartan 50 milliGRAM(s) Oral daily  Parenteral Nutrition - Adult 1 Each (64 mL/Hr) TPN Continuous <Continuous>  fat emulsion 20% Infusion 50 Gram(s) (20.83 mL/Hr) IV Continuous <Continuous>  Parenteral Nutrition - Adult 1 Each (64 mL/Hr) TPN Continuous <Continuous>  fat emulsion 20% Infusion 50 Gram(s) (20.8 mL/Hr) IV Continuous <Continuous>    MEDICATIONS  (PRN):  ondansetron Injectable 4 milliGRAM(s) IV Push every 6 hours PRN Nausea  sodium chloride 0.9% lock flush 10 milliLiter(s) IV Push every 1 hour PRN After each medication administration  sodium chloride 0.9% lock flush 10 milliLiter(s) IV Push every 12 hours PRN Lumen of catheter NOT used  diphenhydrAMINE   Injectable 25 milliGRAM(s) IV Push every 6 hours PRN Rash and/or Itching  HYDROmorphone  Injectable 0.5 milliGRAM(s) IV Push every 4 hours PRN Moderate Pain (4 - 6)  HYDROmorphone  Injectable 1 milliGRAM(s) IV Push every 4 hours PRN Severe Pain (7 - 10)      Weight: (9/1) 99.5kg  Daily     Daily     Weight Change: no new wt noted--need updates w/ wt    Estimated energy needs: remain unchanged from the previous f/u:  Estimated energy needs using 52 kg IBW:  30-35 kcal/kg (8278-6498 kcal).   1.8-2 g/kg ( g protein).  30-35 mL/kg (2351-6179 mL fluid).     Subjective: surgical incision with wound vac in place. vac intact with ostomy pouch to collect leakage will change tomorrow; output is decreasing to ~15cc currently.  Pt remains NPO on TPN until output is less than 10cc/day for 2 days . Pt is unot feeling hungry, however eager to start po diet. Denies any GI discomfort; current  TPN provides 100% of kcal/protein needs; rec continuing until po diet is medically feasible.     Previous Nutrition Diagnosis: Increased nutrient needs RT fistula & s/p surgery AEB increased kcal/protein needs.     Active [  X]  Resolved [   ]    If resolved, new PES:     Goal:PT to meet >75% needs via tolerated route    Recommendations: continue on TPN with current order and advance diet to regular when medically feasible    Education: tpn and diet advancement    Risk Level: High [   ] Moderate [ X] Low [   ]

## 2017-09-04 NOTE — PROGRESS NOTE ADULT - ASSESSMENT
57 F s/p  SBR, fistula resection, esophagojejunostomy revision w/ post-op course complicated by RTOR for distal anastomosis breakdown, ATN, acute respiratory failure, & septic shock.   NPO/TPN/IVF  Pain/nausea control  ISS  Zosyn  Nystatin powder  SCDs, SQH, OOBA  Drains: Malecot in enterotomy site, wound vac to midline  Wounds: Midline xiphoid to pubis incision; DTI & stage 2 pressure ulcer noted. 57 F s/p  SBR, fistula resection, esophagojejunostomy revision w/ post-op course complicated by RTOR for distal anastomosis breakdown, ATN, acute respiratory failure, & septic shock.     NPO/TPN/IVF  Pain/nausea control  ISS  Zosyn  Nystatin powder  SCDs, SQH, OOBA  Drains: Malecot in enterotomy site, wound vac to midline  Wounds: Midline xiphoid to pubis incision; DTI & stage 2 pressure ulcer noted.    benadryl 25mg q6 IV for itching.

## 2017-09-04 NOTE — PROGRESS NOTE ADULT - SUBJECTIVE AND OBJECTIVE BOX
O/N: BRAYDEN, VSS  9/3: VSS, BRAYDEN    STATUS POST  7/24: SBR resection, fistula resection, revision of esophagogegunostomy drain through esophageal hiatus in R mediastinum  7/29: IR drainage of 800 cc of succus  7/29: Ex-lap, SB anastamosis in BP limb breakdown with succus throughout abdomen  8/1: abd washout, drainage of abscess, biologic mesh placement, closure of abdominal wall, vac and retention suture  8/7: abd washout, mesh removal, frozen abd noted, LLQ CHECO replaced with benson drain  8/10: leak noted from previous anastamosis site on L side, mid abdomen malecot drain placed in enterotomy, JPs x 2 placed, necrotic fascia debrided, vicryl mesh sutured to fascia circumferentially, xeroform over mesh then vac dressing placed O/N: BRAYDEN, VSS  9/3: VSS, BRAYDEN    STATUS POST  7/24: SBR resection, fistula resection, revision of esophagogegunostomy drain through esophageal hiatus in R mediastinum  7/29: IR drainage of 800 cc of succus  7/29: Ex-lap, SB anastamosis in BP limb breakdown with succus throughout abdomen  8/1: abd washout, drainage of abscess, biologic mesh placement, closure of abdominal wall, vac and retention suture  8/7: abd washout, mesh removal, frozen abd noted, LLQ CHECO replaced with benson drain  8/10: leak noted from previous anastamosis site on L side, mid abdomen malecot drain placed in enterotomy, JPs x 2 placed, necrotic fascia debrided, vicryl mesh sutured to fascia circumferentially, xeroform over mesh then vac dressing placed	      Patient seen and examined bedside with chief resident. Patient is doing well. Patient reports that her wound bag leaked overnight. Patient denies abdominal pain, nausea, vomiting, chest pain, shortness of breath, chills, or fevers. Patient reports intermittent itchiness around the wound vac.     piperacillin/tazobactam IVPB. 4.5 Gram(s) IV Intermittent every 6 hours  losartan 50 milliGRAM(s) Oral daily      Vital Signs Last 24 Hrs  T(C): 36.8 (04 Sep 2017 08:50), Max: 36.8 (04 Sep 2017 08:50)  T(F): 98.2 (04 Sep 2017 08:50), Max: 98.2 (04 Sep 2017 08:50)  HR: 57 (04 Sep 2017 08:50) (52 - 60)  BP: 128/77 (04 Sep 2017 08:50) (128/77 - 145/75)  BP(mean): --  RR: 16 (04 Sep 2017 08:50) (16 - 17)  SpO2: 95% (04 Sep 2017 08:50) (95% - 98%)    I&O's Detail    03 Sep 2017 07:01  -  04 Sep 2017 07:00  --------------------------------------------------------  IN:    Fat Emulsion 20%: 208 mL    IV PiggyBack: 200 mL    Solution: 200 mL    TPN (Total Parenteral Nutrition): 1408 mL  Total IN: 2016 mL    OUT:    Drain: 25 mL    Drain: 100 mL    VAC (Vacuum Assisted Closure) System: 210 mL    Voided: 2350 mL  Total OUT: 2685 mL    Total NET: -669 mL      04 Sep 2017 07:01  -  04 Sep 2017 10:01  --------------------------------------------------------  IN:    TPN (Total Parenteral Nutrition): 192 mL  Total IN: 192 mL    OUT:  Total OUT: 0 mL    Total NET: 192 mL        PHYSICAL EXAM:      Constitutional: NAD. resting comfortably in bed.    Gastrointestinal: soft. NT. ND. surgical incision with wound vac in place. vac intact with ostomy pouch to collect leakage.    Extremities: no edema. SCDs in place.

## 2017-09-05 LAB
ANION GAP SERPL CALC-SCNC: 14 MMOL/L — SIGNIFICANT CHANGE UP (ref 5–17)
BUN SERPL-MCNC: 28 MG/DL — HIGH (ref 7–23)
CALCIUM SERPL-MCNC: 9.6 MG/DL — SIGNIFICANT CHANGE UP (ref 8.4–10.5)
CHLORIDE SERPL-SCNC: 101 MMOL/L — SIGNIFICANT CHANGE UP (ref 96–108)
CO2 SERPL-SCNC: 25 MMOL/L — SIGNIFICANT CHANGE UP (ref 22–31)
CREAT SERPL-MCNC: 0.8 MG/DL — SIGNIFICANT CHANGE UP (ref 0.5–1.3)
GLUCOSE SERPL-MCNC: 137 MG/DL — HIGH (ref 70–99)
HCT VFR BLD CALC: 31.6 % — LOW (ref 34.5–45)
HGB BLD-MCNC: 9.6 G/DL — LOW (ref 11.5–15.5)
MAGNESIUM SERPL-MCNC: 2.1 MG/DL — SIGNIFICANT CHANGE UP (ref 1.6–2.6)
MCHC RBC-ENTMCNC: 28.9 PG — SIGNIFICANT CHANGE UP (ref 27–34)
MCHC RBC-ENTMCNC: 30.4 G/DL — LOW (ref 32–36)
MCV RBC AUTO: 95.2 FL — SIGNIFICANT CHANGE UP (ref 80–100)
PHOSPHATE SERPL-MCNC: 2.9 MG/DL — SIGNIFICANT CHANGE UP (ref 2.5–4.5)
PLATELET # BLD AUTO: 316 K/UL — SIGNIFICANT CHANGE UP (ref 150–400)
POTASSIUM SERPL-MCNC: 3.9 MMOL/L — SIGNIFICANT CHANGE UP (ref 3.5–5.3)
POTASSIUM SERPL-SCNC: 3.9 MMOL/L — SIGNIFICANT CHANGE UP (ref 3.5–5.3)
RBC # BLD: 3.32 M/UL — LOW (ref 3.8–5.2)
RBC # FLD: 18.7 % — HIGH (ref 10.3–16.9)
SODIUM SERPL-SCNC: 140 MMOL/L — SIGNIFICANT CHANGE UP (ref 135–145)
TRIGL SERPL-MCNC: 202 MG/DL — HIGH (ref 10–149)
WBC # BLD: 9.4 K/UL — SIGNIFICANT CHANGE UP (ref 3.8–10.5)
WBC # FLD AUTO: 9.4 K/UL — SIGNIFICANT CHANGE UP (ref 3.8–10.5)

## 2017-09-05 RX ORDER — ELECTROLYTE SOLUTION,INJ
1 VIAL (ML) INTRAVENOUS
Qty: 0 | Refills: 0 | Status: DISCONTINUED | OUTPATIENT
Start: 2017-09-05 | End: 2017-09-05

## 2017-09-05 RX ORDER — HYDROMORPHONE HYDROCHLORIDE 2 MG/ML
1 INJECTION INTRAMUSCULAR; INTRAVENOUS; SUBCUTANEOUS ONCE
Qty: 0 | Refills: 0 | Status: DISCONTINUED | OUTPATIENT
Start: 2017-09-05 | End: 2017-09-05

## 2017-09-05 RX ORDER — DIPHENHYDRAMINE HCL 50 MG
25 CAPSULE ORAL ONCE
Qty: 0 | Refills: 0 | Status: COMPLETED | OUTPATIENT
Start: 2017-09-05 | End: 2017-09-05

## 2017-09-05 RX ORDER — DIPHENHYDRAMINE HCL 50 MG
25 CAPSULE ORAL ONCE
Qty: 0 | Refills: 0 | Status: DISCONTINUED | OUTPATIENT
Start: 2017-09-05 | End: 2017-09-06

## 2017-09-05 RX ADMIN — HEPARIN SODIUM 5000 UNIT(S): 5000 INJECTION INTRAVENOUS; SUBCUTANEOUS at 05:28

## 2017-09-05 RX ADMIN — PIPERACILLIN AND TAZOBACTAM 200 GRAM(S): 4; .5 INJECTION, POWDER, LYOPHILIZED, FOR SOLUTION INTRAVENOUS at 17:42

## 2017-09-05 RX ADMIN — HEPARIN SODIUM 5000 UNIT(S): 5000 INJECTION INTRAVENOUS; SUBCUTANEOUS at 13:13

## 2017-09-05 RX ADMIN — Medication 25 MILLIGRAM(S): at 17:42

## 2017-09-05 RX ADMIN — HEPARIN SODIUM 5000 UNIT(S): 5000 INJECTION INTRAVENOUS; SUBCUTANEOUS at 21:00

## 2017-09-05 RX ADMIN — Medication 1 APPLICATION(S): at 12:13

## 2017-09-05 RX ADMIN — PIPERACILLIN AND TAZOBACTAM 200 GRAM(S): 4; .5 INJECTION, POWDER, LYOPHILIZED, FOR SOLUTION INTRAVENOUS at 05:28

## 2017-09-05 RX ADMIN — HYDROMORPHONE HYDROCHLORIDE 1 MILLIGRAM(S): 2 INJECTION INTRAMUSCULAR; INTRAVENOUS; SUBCUTANEOUS at 21:00

## 2017-09-05 RX ADMIN — PANTOPRAZOLE SODIUM 40 MILLIGRAM(S): 20 TABLET, DELAYED RELEASE ORAL at 12:10

## 2017-09-05 RX ADMIN — Medication 25 MILLIGRAM(S): at 10:04

## 2017-09-05 RX ADMIN — HYDROMORPHONE HYDROCHLORIDE 1 MILLIGRAM(S): 2 INJECTION INTRAMUSCULAR; INTRAVENOUS; SUBCUTANEOUS at 12:10

## 2017-09-05 RX ADMIN — NYSTATIN CREAM 1 APPLICATION(S): 100000 CREAM TOPICAL at 17:43

## 2017-09-05 RX ADMIN — HYDROMORPHONE HYDROCHLORIDE 1 MILLIGRAM(S): 2 INJECTION INTRAMUSCULAR; INTRAVENOUS; SUBCUTANEOUS at 17:12

## 2017-09-05 RX ADMIN — HYDROMORPHONE HYDROCHLORIDE 1 MILLIGRAM(S): 2 INJECTION INTRAMUSCULAR; INTRAVENOUS; SUBCUTANEOUS at 16:12

## 2017-09-05 RX ADMIN — HYDROMORPHONE HYDROCHLORIDE 1 MILLIGRAM(S): 2 INJECTION INTRAMUSCULAR; INTRAVENOUS; SUBCUTANEOUS at 20:39

## 2017-09-05 RX ADMIN — HYDROMORPHONE HYDROCHLORIDE 1 MILLIGRAM(S): 2 INJECTION INTRAMUSCULAR; INTRAVENOUS; SUBCUTANEOUS at 10:10

## 2017-09-05 RX ADMIN — Medication 10 MILLIGRAM(S): at 12:10

## 2017-09-05 RX ADMIN — PIPERACILLIN AND TAZOBACTAM 200 GRAM(S): 4; .5 INJECTION, POWDER, LYOPHILIZED, FOR SOLUTION INTRAVENOUS at 00:12

## 2017-09-05 RX ADMIN — Medication 25 MILLIGRAM(S): at 03:44

## 2017-09-05 RX ADMIN — Medication 25 MILLIGRAM(S): at 13:13

## 2017-09-05 RX ADMIN — LOSARTAN POTASSIUM 50 MILLIGRAM(S): 100 TABLET, FILM COATED ORAL at 05:28

## 2017-09-05 RX ADMIN — HYDROMORPHONE HYDROCHLORIDE 1 MILLIGRAM(S): 2 INJECTION INTRAMUSCULAR; INTRAVENOUS; SUBCUTANEOUS at 13:10

## 2017-09-05 RX ADMIN — HYDROMORPHONE HYDROCHLORIDE 1 MILLIGRAM(S): 2 INJECTION INTRAMUSCULAR; INTRAVENOUS; SUBCUTANEOUS at 09:55

## 2017-09-05 RX ADMIN — HYDROMORPHONE HYDROCHLORIDE 1 MILLIGRAM(S): 2 INJECTION INTRAMUSCULAR; INTRAVENOUS; SUBCUTANEOUS at 05:54

## 2017-09-05 RX ADMIN — HYDROMORPHONE HYDROCHLORIDE 1 MILLIGRAM(S): 2 INJECTION INTRAMUSCULAR; INTRAVENOUS; SUBCUTANEOUS at 06:54

## 2017-09-05 RX ADMIN — HYDROMORPHONE HYDROCHLORIDE 1 MILLIGRAM(S): 2 INJECTION INTRAMUSCULAR; INTRAVENOUS; SUBCUTANEOUS at 01:31

## 2017-09-05 RX ADMIN — Medication 2 MILLIGRAM(S): at 00:12

## 2017-09-05 RX ADMIN — ESCITALOPRAM OXALATE 20 MILLIGRAM(S): 10 TABLET, FILM COATED ORAL at 21:00

## 2017-09-05 RX ADMIN — PIPERACILLIN AND TAZOBACTAM 200 GRAM(S): 4; .5 INJECTION, POWDER, LYOPHILIZED, FOR SOLUTION INTRAVENOUS at 12:10

## 2017-09-05 RX ADMIN — HYDROMORPHONE HYDROCHLORIDE 1 MILLIGRAM(S): 2 INJECTION INTRAMUSCULAR; INTRAVENOUS; SUBCUTANEOUS at 02:31

## 2017-09-05 RX ADMIN — NYSTATIN CREAM 1 APPLICATION(S): 100000 CREAM TOPICAL at 05:33

## 2017-09-05 NOTE — ADVANCED PRACTICE NURSE CONSULT - ASSESSMENT
Informed by patient and house staff that VAC dressing had been changed due to leakage. CHECO to wall suction not functioning, red rubber catheters in area of fistula on left lower quadrant of abdomen; no effluent noted from catheters. Fistula in left lower abdominal quadrant draining large amount of greenish/yellow effluent. Fistula in center of wound bed with small amount of white frothy effluent. No effluent noted from proximal edge of wound. Wound bed with granulation tissue, measuring 17cm x15cm x 2cm.  Stomahesive barriers placed around periwound for protection. Isolated fistula in center of wound and left lower quadrant using isolator strip. Wound bed protected with stoma powder then adaptic touch silicone dressing. White foam placed over fistula in proximal edge of wound. Black Granufoam placed on wound bed and VAC set at 125 mm/Hg. Aquacel AG placed over fistula on left lower quadrant of wound, then covered with one piece [place] pouch placed over CHECO drainage attached to wall suction on low as per Dr Brady's instructions. Will continue to monitor.

## 2017-09-05 NOTE — PROGRESS NOTE ADULT - SUBJECTIVE AND OBJECTIVE BOX
O/N: BRAYDEN, VSS  9/4: TPN ordered, VSS    STATUS POST:  7/24: SBR resection, fistula resection, revision of esophagogegunostomy drain through esophageal hiatus in R mediastinum  7/29: IR drainage of 800 cc of succus  7/29: Ex-lap, SB anastamosis in BP limb breakdown with succus throughout abdomen  8/1: abd washout, drainage of abscess, biologic mesh placement, closure of abdominal wall, vac and retention suture  8/7: abd washout, mesh removal, frozen abd noted, LLQ CHECO replaced with benson drain  8/10: leak noted from previous anastamosis site on L side, mid abdomen malecot drain placed in enterotomy, JPs x 2 placed, necrotic fascia debrided, vicryl mesh sutured to fascia circumferentially, xeroform over mesh then vac dressing O/N: BRAYDEN, VSS  9/4: TPN ordered, VSS    STATUS POST:  7/24: SBR resection, fistula resection, revision of esophagogegunostomy drain through esophageal hiatus in R mediastinum  7/29: IR drainage of 800 cc of succus  7/29: Ex-lap, SB anastamosis in BP limb breakdown with succus throughout abdomen  8/1: abd washout, drainage of abscess, biologic mesh placement, closure of abdominal wall, vac and retention suture  8/7: abd washout, mesh removal, frozen abd noted, LLQ CHECO replaced with benson drain  8/10: leak noted from previous anastamosis site on L side, mid abdomen malecot drain placed in enterotomy, JPs x 2 placed, necrotic fascia debrided, vicryl mesh sutured to fascia circumferentially, xeroform over mesh then vac dressing     SUBJECTIVE: Patient seen and examined bedside by chief resident. Doing well, no c/o chest pain, SOB.     heparin  Injectable 5000 Unit(s) SubCutaneous every 8 hours  piperacillin/tazobactam IVPB. 4.5 Gram(s) IV Intermittent every 6 hours  losartan 50 milliGRAM(s) Oral daily      Vital Signs Last 24 Hrs  T(C): 36.7 (05 Sep 2017 05:52), Max: 36.9 (05 Sep 2017 01:28)  T(F): 98 (05 Sep 2017 05:52), Max: 98.4 (05 Sep 2017 01:28)  HR: 65 (05 Sep 2017 05:52) (53 - 65)  BP: 133/84 (05 Sep 2017 05:52) (128/77 - 160/82)  BP(mean): --  RR: 17 (05 Sep 2017 05:52) (16 - 18)  SpO2: 97% (05 Sep 2017 05:52) (94% - 98%)  I&O's Detail    04 Sep 2017 07:01  -  05 Sep 2017 07:00  --------------------------------------------------------  IN:    Fat Emulsion 20%: 145.6 mL    Solution: 200 mL    TPN (Total Parenteral Nutrition): 1152 mL  Total IN: 1497.6 mL    OUT:    Drain: 155 mL    Drain: 35 mL    VAC (Vacuum Assisted Closure) System: 125 mL    Voided: 2200 mL  Total OUT: 2515 mL    Total NET: -1017.4 mL          General: NAD, resting comfortably in bed  C/V: NSR  Pulm: Nonlabored breathing, no respiratory distress  Abd: soft, NT/ND.  Extrem: WWP, no edema, SCDs in place        LABS:                        9.6    9.4   )-----------( 316      ( 05 Sep 2017 06:00 )             31.6     09-05    140  |  101  |  28<H>  ----------------------------<  137<H>  3.9   |  25  |  0.80    Ca    9.6      05 Sep 2017 06:00  Phos  2.9     09-05  Mg     2.1     09-05

## 2017-09-05 NOTE — PROGRESS NOTE ADULT - ASSESSMENT
57 F s/p  SBR, fistula resection, esophagojejunostomy revision w/ post-op course complicated by RTOR for distal anastomosis breakdown, ATN, acute respiratory failure, & septic shock.   NPO/TPN/IVF  Pain/nausea control  ISS  Zosyn  Nystatin powder  SCDs, SQH, OOBA  Drains: Malecot in enterotomy site, wound vac to midline  Wounds: Midline xiphoid to pubis incision; DTI & stage 2 pressure ulcer noted.

## 2017-09-06 LAB
ANION GAP SERPL CALC-SCNC: 13 MMOL/L — SIGNIFICANT CHANGE UP (ref 5–17)
BUN SERPL-MCNC: 29 MG/DL — HIGH (ref 7–23)
CALCIUM SERPL-MCNC: 9.7 MG/DL — SIGNIFICANT CHANGE UP (ref 8.4–10.5)
CHLORIDE SERPL-SCNC: 103 MMOL/L — SIGNIFICANT CHANGE UP (ref 96–108)
CHOLEST SERPL-MCNC: 137 MG/DL — SIGNIFICANT CHANGE UP (ref 10–199)
CO2 SERPL-SCNC: 25 MMOL/L — SIGNIFICANT CHANGE UP (ref 22–31)
CREAT SERPL-MCNC: 0.7 MG/DL — SIGNIFICANT CHANGE UP (ref 0.5–1.3)
GLUCOSE SERPL-MCNC: 135 MG/DL — HIGH (ref 70–99)
HCT VFR BLD CALC: 32.6 % — LOW (ref 34.5–45)
HDLC SERPL-MCNC: 30 MG/DL — LOW (ref 40–125)
HGB BLD-MCNC: 9.9 G/DL — LOW (ref 11.5–15.5)
LIPID PNL WITH DIRECT LDL SERPL: 80 MG/DL — SIGNIFICANT CHANGE UP
MAGNESIUM SERPL-MCNC: 2.1 MG/DL — SIGNIFICANT CHANGE UP (ref 1.6–2.6)
MCHC RBC-ENTMCNC: 28.9 PG — SIGNIFICANT CHANGE UP (ref 27–34)
MCHC RBC-ENTMCNC: 30.4 G/DL — LOW (ref 32–36)
MCV RBC AUTO: 95 FL — SIGNIFICANT CHANGE UP (ref 80–100)
PHOSPHATE SERPL-MCNC: 3 MG/DL — SIGNIFICANT CHANGE UP (ref 2.5–4.5)
PLATELET # BLD AUTO: 319 K/UL — SIGNIFICANT CHANGE UP (ref 150–400)
POTASSIUM SERPL-MCNC: 3.8 MMOL/L — SIGNIFICANT CHANGE UP (ref 3.5–5.3)
POTASSIUM SERPL-SCNC: 3.8 MMOL/L — SIGNIFICANT CHANGE UP (ref 3.5–5.3)
RBC # BLD: 3.43 M/UL — LOW (ref 3.8–5.2)
RBC # FLD: 18.9 % — HIGH (ref 10.3–16.9)
SODIUM SERPL-SCNC: 141 MMOL/L — SIGNIFICANT CHANGE UP (ref 135–145)
TOTAL CHOLESTEROL/HDL RATIO MEASUREMENT: 4.6 RATIO — SIGNIFICANT CHANGE UP (ref 3.3–7.1)
TRIGL SERPL-MCNC: 135 MG/DL — SIGNIFICANT CHANGE UP (ref 10–149)
WBC # BLD: 10 K/UL — SIGNIFICANT CHANGE UP (ref 3.8–10.5)
WBC # FLD AUTO: 10 K/UL — SIGNIFICANT CHANGE UP (ref 3.8–10.5)

## 2017-09-06 RX ORDER — HYDROMORPHONE HYDROCHLORIDE 2 MG/ML
0.5 INJECTION INTRAMUSCULAR; INTRAVENOUS; SUBCUTANEOUS ONCE
Qty: 0 | Refills: 0 | Status: DISCONTINUED | OUTPATIENT
Start: 2017-09-06 | End: 2017-09-06

## 2017-09-06 RX ORDER — ELECTROLYTE SOLUTION,INJ
1 VIAL (ML) INTRAVENOUS
Qty: 0 | Refills: 0 | Status: DISCONTINUED | OUTPATIENT
Start: 2017-09-06 | End: 2017-09-06

## 2017-09-06 RX ORDER — DIPHENHYDRAMINE HCL 50 MG
25 CAPSULE ORAL ONCE
Qty: 0 | Refills: 0 | Status: COMPLETED | OUTPATIENT
Start: 2017-09-06 | End: 2017-09-06

## 2017-09-06 RX ADMIN — PIPERACILLIN AND TAZOBACTAM 200 GRAM(S): 4; .5 INJECTION, POWDER, LYOPHILIZED, FOR SOLUTION INTRAVENOUS at 00:42

## 2017-09-06 RX ADMIN — HYDROMORPHONE HYDROCHLORIDE 1 MILLIGRAM(S): 2 INJECTION INTRAMUSCULAR; INTRAVENOUS; SUBCUTANEOUS at 00:42

## 2017-09-06 RX ADMIN — HYDROMORPHONE HYDROCHLORIDE 1 MILLIGRAM(S): 2 INJECTION INTRAMUSCULAR; INTRAVENOUS; SUBCUTANEOUS at 20:00

## 2017-09-06 RX ADMIN — HEPARIN SODIUM 5000 UNIT(S): 5000 INJECTION INTRAVENOUS; SUBCUTANEOUS at 13:33

## 2017-09-06 RX ADMIN — HYDROMORPHONE HYDROCHLORIDE 1 MILLIGRAM(S): 2 INJECTION INTRAMUSCULAR; INTRAVENOUS; SUBCUTANEOUS at 10:00

## 2017-09-06 RX ADMIN — PANTOPRAZOLE SODIUM 40 MILLIGRAM(S): 20 TABLET, DELAYED RELEASE ORAL at 13:32

## 2017-09-06 RX ADMIN — NYSTATIN CREAM 1 APPLICATION(S): 100000 CREAM TOPICAL at 18:54

## 2017-09-06 RX ADMIN — PREGABALIN 1000 MICROGRAM(S): 225 CAPSULE ORAL at 13:33

## 2017-09-06 RX ADMIN — HYDROMORPHONE HYDROCHLORIDE 1 MILLIGRAM(S): 2 INJECTION INTRAMUSCULAR; INTRAVENOUS; SUBCUTANEOUS at 13:39

## 2017-09-06 RX ADMIN — CALCITRIOL 1 MICROGRAM(S): 0.5 CAPSULE ORAL at 13:32

## 2017-09-06 RX ADMIN — HYDROMORPHONE HYDROCHLORIDE 1 MILLIGRAM(S): 2 INJECTION INTRAMUSCULAR; INTRAVENOUS; SUBCUTANEOUS at 04:55

## 2017-09-06 RX ADMIN — HYDROMORPHONE HYDROCHLORIDE 1 MILLIGRAM(S): 2 INJECTION INTRAMUSCULAR; INTRAVENOUS; SUBCUTANEOUS at 09:05

## 2017-09-06 RX ADMIN — LOSARTAN POTASSIUM 50 MILLIGRAM(S): 100 TABLET, FILM COATED ORAL at 05:54

## 2017-09-06 RX ADMIN — ESCITALOPRAM OXALATE 20 MILLIGRAM(S): 10 TABLET, FILM COATED ORAL at 21:01

## 2017-09-06 RX ADMIN — PIPERACILLIN AND TAZOBACTAM 200 GRAM(S): 4; .5 INJECTION, POWDER, LYOPHILIZED, FOR SOLUTION INTRAVENOUS at 18:54

## 2017-09-06 RX ADMIN — HYDROMORPHONE HYDROCHLORIDE 1 MILLIGRAM(S): 2 INJECTION INTRAMUSCULAR; INTRAVENOUS; SUBCUTANEOUS at 05:15

## 2017-09-06 RX ADMIN — HYDROMORPHONE HYDROCHLORIDE 0.5 MILLIGRAM(S): 2 INJECTION INTRAMUSCULAR; INTRAVENOUS; SUBCUTANEOUS at 12:00

## 2017-09-06 RX ADMIN — HYDROMORPHONE HYDROCHLORIDE 1 MILLIGRAM(S): 2 INJECTION INTRAMUSCULAR; INTRAVENOUS; SUBCUTANEOUS at 14:16

## 2017-09-06 RX ADMIN — Medication 2 MILLIGRAM(S): at 03:21

## 2017-09-06 RX ADMIN — HYDROMORPHONE HYDROCHLORIDE 1 MILLIGRAM(S): 2 INJECTION INTRAMUSCULAR; INTRAVENOUS; SUBCUTANEOUS at 23:56

## 2017-09-06 RX ADMIN — Medication 25 MILLIGRAM(S): at 06:32

## 2017-09-06 RX ADMIN — HYDROMORPHONE HYDROCHLORIDE 1 MILLIGRAM(S): 2 INJECTION INTRAMUSCULAR; INTRAVENOUS; SUBCUTANEOUS at 01:00

## 2017-09-06 RX ADMIN — Medication 2 MILLIGRAM(S): at 21:46

## 2017-09-06 RX ADMIN — Medication 25 MILLIGRAM(S): at 20:55

## 2017-09-06 RX ADMIN — Medication 25 MILLIGRAM(S): at 10:08

## 2017-09-06 RX ADMIN — Medication 10 MILLIGRAM(S): at 13:32

## 2017-09-06 RX ADMIN — PIPERACILLIN AND TAZOBACTAM 200 GRAM(S): 4; .5 INJECTION, POWDER, LYOPHILIZED, FOR SOLUTION INTRAVENOUS at 06:01

## 2017-09-06 RX ADMIN — NYSTATIN CREAM 1 APPLICATION(S): 100000 CREAM TOPICAL at 05:54

## 2017-09-06 RX ADMIN — HEPARIN SODIUM 5000 UNIT(S): 5000 INJECTION INTRAVENOUS; SUBCUTANEOUS at 21:01

## 2017-09-06 RX ADMIN — HEPARIN SODIUM 5000 UNIT(S): 5000 INJECTION INTRAVENOUS; SUBCUTANEOUS at 05:54

## 2017-09-06 RX ADMIN — Medication 25 MILLIGRAM(S): at 15:56

## 2017-09-06 RX ADMIN — Medication 25 MILLIGRAM(S): at 00:41

## 2017-09-06 RX ADMIN — PIPERACILLIN AND TAZOBACTAM 200 GRAM(S): 4; .5 INJECTION, POWDER, LYOPHILIZED, FOR SOLUTION INTRAVENOUS at 13:30

## 2017-09-06 RX ADMIN — HYDROMORPHONE HYDROCHLORIDE 1 MILLIGRAM(S): 2 INJECTION INTRAMUSCULAR; INTRAVENOUS; SUBCUTANEOUS at 19:30

## 2017-09-06 RX ADMIN — HYDROMORPHONE HYDROCHLORIDE 0.5 MILLIGRAM(S): 2 INJECTION INTRAMUSCULAR; INTRAVENOUS; SUBCUTANEOUS at 11:20

## 2017-09-06 RX ADMIN — Medication 1 APPLICATION(S): at 19:04

## 2017-09-06 NOTE — PROGRESS NOTE ADULT - SUBJECTIVE AND OBJECTIVE BOX
O/N: BRAYDEN, VSS  9/5: Vac changed by wound care. TPN ordered.    STATUS POST:  7/24: SBR resection, fistula resection, revision of esophagogegunostomy drain through esophageal hiatus in R mediastinum  7/29: IR drainage of 800 cc of succus  7/29: Ex-lap, SB anastamosis in BP limb breakdown with succus throughout abdomen  8/1: abd washout, drainage of abscess, biologic mesh placement, closure of abdominal wall, vac and retention suture  8/7: abd washout, mesh removal, frozen abd noted, LLQ CHECO replaced with benson drain  8/10: leak noted from previous anastamosis site on L side, mid abdomen malecot drain placed in enterotomy, JPs x 2 placed, necrotic fascia debrided, vicryl mesh sutured to fascia circumferentially, xeroform over mesh then vac dressing placed O/N: BRAYDEN, VSS  9/5: Vac changed by wound care. TPN ordered.    STATUS POST:  7/24: SBR resection, fistula resection, revision of esophagogegunostomy drain through esophageal hiatus in R mediastinum  7/29: IR drainage of 800 cc of succus  7/29: Ex-lap, SB anastamosis in BP limb breakdown with succus throughout abdomen  8/1: abd washout, drainage of abscess, biologic mesh placement, closure of abdominal wall, vac and retention suture  8/7: abd washout, mesh removal, frozen abd noted, LLQ CHECO replaced with benson drain  8/10: leak noted from previous anastamosis site on L side, mid abdomen malecot drain placed in enterotomy, JPs x 2 placed, necrotic fascia debrided, vicryl mesh sutured to fascia circumferentially, xeroform over mesh then vac dressing placed	        SUBJECTIVE: Patient denies any complaints.  Flatus: [] YES [X] NO             Bowel Movement: [ ] YES [X ] NO  Pain (0-10):            Pain Control Adequate: [X ] YES [ ] NO  Nausea: [ ] YES [X ] NO            Vomiting: [ ] YES [X ] NO  Diarrhea: [ ] YES [X ] NO         Constipation: [ ] YES [X ] NO     Chest Pain: [ ] YES [X ] NO    SOB:  [ ] YES [X ] NO    heparin  Injectable 5000 Unit(s) SubCutaneous every 8 hours  piperacillin/tazobactam IVPB. 4.5 Gram(s) IV Intermittent every 6 hours  losartan 50 milliGRAM(s) Oral daily      Vital Signs Last 24 Hrs  T(C): 36.3 (06 Sep 2017 05:49), Max: 36.4 (05 Sep 2017 16:24)  T(F): 97.3 (06 Sep 2017 05:49), Max: 97.6 (05 Sep 2017 16:24)  HR: 60 (06 Sep 2017 05:49) (54 - 63)  BP: 149/91 (06 Sep 2017 05:49) (111/78 - 149/91)  BP(mean): --  RR: 18 (06 Sep 2017 05:49) (16 - 18)  SpO2: 95% (06 Sep 2017 05:49) (95% - 98%)  I&O's Detail    05 Sep 2017 07:01  -  06 Sep 2017 07:00  --------------------------------------------------------  IN:    Solution: 200 mL    TPN (Total Parenteral Nutrition): 768 mL  Total IN: 968 mL    OUT:    Drain: 80 mL    Drain: 175 mL    VAC (Vacuum Assisted Closure) System: 40 mL    Voided: 2950 mL  Total OUT: 3245 mL    Total NET: -2277 mL          General: NAD, resting comfortably in bed  C/V: NSR  Pulm: Nonlabored breathing, no respiratory distress  Abd: soft, NT/ND.  Extrem: WWP, no edema, SCDs in place    Wound vac functioning       LABS:                        9.9    10.0  )-----------( 319      ( 06 Sep 2017 07:06 )             32.6     09-06    141  |  103  |  29<H>  ----------------------------<  135<H>  3.8   |  25  |  0.70    Ca    9.7      06 Sep 2017 07:06  Phos  3.0     09-06  Mg     2.1     09-06            RADIOLOGY & ADDITIONAL STUDIES:

## 2017-09-07 RX ORDER — I.V. FAT EMULSION 20 G/100ML
50 EMULSION INTRAVENOUS
Qty: 0 | Refills: 0 | Status: DISCONTINUED | OUTPATIENT
Start: 2017-09-07 | End: 2017-09-07

## 2017-09-07 RX ORDER — ELECTROLYTE SOLUTION,INJ
1 VIAL (ML) INTRAVENOUS
Qty: 0 | Refills: 0 | Status: DISCONTINUED | OUTPATIENT
Start: 2017-09-07 | End: 2017-09-07

## 2017-09-07 RX ORDER — HYDROMORPHONE HYDROCHLORIDE 2 MG/ML
0.25 INJECTION INTRAMUSCULAR; INTRAVENOUS; SUBCUTANEOUS EVERY 4 HOURS
Qty: 0 | Refills: 0 | Status: DISCONTINUED | OUTPATIENT
Start: 2017-09-07 | End: 2017-09-08

## 2017-09-07 RX ORDER — DIPHENHYDRAMINE HCL 50 MG
25 CAPSULE ORAL ONCE
Qty: 0 | Refills: 0 | Status: COMPLETED | OUTPATIENT
Start: 2017-09-07 | End: 2017-09-07

## 2017-09-07 RX ORDER — DIPHENHYDRAMINE HCL 50 MG
25 CAPSULE ORAL ONCE
Qty: 0 | Refills: 0 | Status: COMPLETED | OUTPATIENT
Start: 2017-09-07 | End: 2017-09-08

## 2017-09-07 RX ORDER — HYDROMORPHONE HYDROCHLORIDE 2 MG/ML
0.5 INJECTION INTRAMUSCULAR; INTRAVENOUS; SUBCUTANEOUS ONCE
Qty: 0 | Refills: 0 | Status: DISCONTINUED | OUTPATIENT
Start: 2017-09-07 | End: 2017-09-07

## 2017-09-07 RX ADMIN — HEPARIN SODIUM 5000 UNIT(S): 5000 INJECTION INTRAVENOUS; SUBCUTANEOUS at 22:00

## 2017-09-07 RX ADMIN — HYDROMORPHONE HYDROCHLORIDE 1 MILLIGRAM(S): 2 INJECTION INTRAMUSCULAR; INTRAVENOUS; SUBCUTANEOUS at 21:24

## 2017-09-07 RX ADMIN — HYDROMORPHONE HYDROCHLORIDE 0.5 MILLIGRAM(S): 2 INJECTION INTRAMUSCULAR; INTRAVENOUS; SUBCUTANEOUS at 02:31

## 2017-09-07 RX ADMIN — Medication 25 MILLIGRAM(S): at 12:59

## 2017-09-07 RX ADMIN — HYDROMORPHONE HYDROCHLORIDE 1 MILLIGRAM(S): 2 INJECTION INTRAMUSCULAR; INTRAVENOUS; SUBCUTANEOUS at 13:15

## 2017-09-07 RX ADMIN — PANTOPRAZOLE SODIUM 40 MILLIGRAM(S): 20 TABLET, DELAYED RELEASE ORAL at 12:58

## 2017-09-07 RX ADMIN — Medication 10 MILLIGRAM(S): at 13:00

## 2017-09-07 RX ADMIN — HYDROMORPHONE HYDROCHLORIDE 1 MILLIGRAM(S): 2 INJECTION INTRAMUSCULAR; INTRAVENOUS; SUBCUTANEOUS at 17:07

## 2017-09-07 RX ADMIN — Medication 25 MILLIGRAM(S): at 17:58

## 2017-09-07 RX ADMIN — Medication 25 MILLIGRAM(S): at 21:24

## 2017-09-07 RX ADMIN — HYDROMORPHONE HYDROCHLORIDE 0.25 MILLIGRAM(S): 2 INJECTION INTRAMUSCULAR; INTRAVENOUS; SUBCUTANEOUS at 19:30

## 2017-09-07 RX ADMIN — NYSTATIN CREAM 1 APPLICATION(S): 100000 CREAM TOPICAL at 17:12

## 2017-09-07 RX ADMIN — HYDROMORPHONE HYDROCHLORIDE 0.25 MILLIGRAM(S): 2 INJECTION INTRAMUSCULAR; INTRAVENOUS; SUBCUTANEOUS at 23:10

## 2017-09-07 RX ADMIN — PIPERACILLIN AND TAZOBACTAM 200 GRAM(S): 4; .5 INJECTION, POWDER, LYOPHILIZED, FOR SOLUTION INTRAVENOUS at 00:03

## 2017-09-07 RX ADMIN — HYDROMORPHONE HYDROCHLORIDE 0.25 MILLIGRAM(S): 2 INJECTION INTRAMUSCULAR; INTRAVENOUS; SUBCUTANEOUS at 23:25

## 2017-09-07 RX ADMIN — HYDROMORPHONE HYDROCHLORIDE 1 MILLIGRAM(S): 2 INJECTION INTRAMUSCULAR; INTRAVENOUS; SUBCUTANEOUS at 00:15

## 2017-09-07 RX ADMIN — PIPERACILLIN AND TAZOBACTAM 200 GRAM(S): 4; .5 INJECTION, POWDER, LYOPHILIZED, FOR SOLUTION INTRAVENOUS at 12:59

## 2017-09-07 RX ADMIN — LOSARTAN POTASSIUM 50 MILLIGRAM(S): 100 TABLET, FILM COATED ORAL at 06:24

## 2017-09-07 RX ADMIN — Medication 25 MILLIGRAM(S): at 08:10

## 2017-09-07 RX ADMIN — HYDROMORPHONE HYDROCHLORIDE 1 MILLIGRAM(S): 2 INJECTION INTRAMUSCULAR; INTRAVENOUS; SUBCUTANEOUS at 21:39

## 2017-09-07 RX ADMIN — Medication 25 MILLIGRAM(S): at 01:51

## 2017-09-07 RX ADMIN — HYDROMORPHONE HYDROCHLORIDE 1 MILLIGRAM(S): 2 INJECTION INTRAMUSCULAR; INTRAVENOUS; SUBCUTANEOUS at 04:34

## 2017-09-07 RX ADMIN — HYDROMORPHONE HYDROCHLORIDE 0.5 MILLIGRAM(S): 2 INJECTION INTRAMUSCULAR; INTRAVENOUS; SUBCUTANEOUS at 11:15

## 2017-09-07 RX ADMIN — PIPERACILLIN AND TAZOBACTAM 200 GRAM(S): 4; .5 INJECTION, POWDER, LYOPHILIZED, FOR SOLUTION INTRAVENOUS at 17:07

## 2017-09-07 RX ADMIN — HYDROMORPHONE HYDROCHLORIDE 1 MILLIGRAM(S): 2 INJECTION INTRAMUSCULAR; INTRAVENOUS; SUBCUTANEOUS at 12:59

## 2017-09-07 RX ADMIN — HYDROMORPHONE HYDROCHLORIDE 1 MILLIGRAM(S): 2 INJECTION INTRAMUSCULAR; INTRAVENOUS; SUBCUTANEOUS at 04:45

## 2017-09-07 RX ADMIN — Medication 1 APPLICATION(S): at 12:05

## 2017-09-07 RX ADMIN — HEPARIN SODIUM 5000 UNIT(S): 5000 INJECTION INTRAVENOUS; SUBCUTANEOUS at 06:24

## 2017-09-07 RX ADMIN — HYDROMORPHONE HYDROCHLORIDE 1 MILLIGRAM(S): 2 INJECTION INTRAMUSCULAR; INTRAVENOUS; SUBCUTANEOUS at 08:30

## 2017-09-07 RX ADMIN — I.V. FAT EMULSION 20.83 GRAM(S): 20 EMULSION INTRAVENOUS at 21:24

## 2017-09-07 RX ADMIN — Medication 64 EACH: at 18:49

## 2017-09-07 RX ADMIN — HYDROMORPHONE HYDROCHLORIDE 0.25 MILLIGRAM(S): 2 INJECTION INTRAMUSCULAR; INTRAVENOUS; SUBCUTANEOUS at 19:10

## 2017-09-07 RX ADMIN — HYDROMORPHONE HYDROCHLORIDE 0.5 MILLIGRAM(S): 2 INJECTION INTRAMUSCULAR; INTRAVENOUS; SUBCUTANEOUS at 02:50

## 2017-09-07 RX ADMIN — HYDROMORPHONE HYDROCHLORIDE 1 MILLIGRAM(S): 2 INJECTION INTRAMUSCULAR; INTRAVENOUS; SUBCUTANEOUS at 08:10

## 2017-09-07 RX ADMIN — HYDROMORPHONE HYDROCHLORIDE 0.5 MILLIGRAM(S): 2 INJECTION INTRAMUSCULAR; INTRAVENOUS; SUBCUTANEOUS at 10:47

## 2017-09-07 RX ADMIN — HYDROMORPHONE HYDROCHLORIDE 1 MILLIGRAM(S): 2 INJECTION INTRAMUSCULAR; INTRAVENOUS; SUBCUTANEOUS at 17:30

## 2017-09-07 RX ADMIN — PIPERACILLIN AND TAZOBACTAM 200 GRAM(S): 4; .5 INJECTION, POWDER, LYOPHILIZED, FOR SOLUTION INTRAVENOUS at 06:24

## 2017-09-07 RX ADMIN — HEPARIN SODIUM 5000 UNIT(S): 5000 INJECTION INTRAVENOUS; SUBCUTANEOUS at 14:16

## 2017-09-07 RX ADMIN — ESCITALOPRAM OXALATE 20 MILLIGRAM(S): 10 TABLET, FILM COATED ORAL at 21:24

## 2017-09-07 RX ADMIN — NYSTATIN CREAM 1 APPLICATION(S): 100000 CREAM TOPICAL at 06:24

## 2017-09-07 NOTE — CONSULT NOTE ADULT - SUBJECTIVE AND OBJECTIVE BOX
Vascular Access Service Consult Note    57yFemaleHEALTH ISSUES - PROBLEM Dx:  Hypoxia: Hypoxia  Mucus plugging of bronchi: Mucus plugging of bronchi  Bacteremia: Bacteremia  Sepsis, due to unspecified organism: Sepsis, due to unspecified organism  Hypertension: Hypertension  Obesity: Obesity  DVT (deep venous thrombosis): DVT (deep venous thrombosis)  Stricture of esophagus: Stricture of esophagus             Diagnosis: Stricture of esophagus     Indications for Vascular Access (Check all that apply)  [ X ]  Antibiotic Therapy       Antibiotic Prescribed:                                                                             Expected Duration of Therapy:               [  ]  IV Hydration  [X  ]  Total Parenteral Nutrition  [  ]  Chemotherapy  [  ]  Difficult Venous Access  [  ]  CVP monitoring  [  ]  Medications with high potential for tissue necrosis on extravasation  [  ]  Other    Screening (Check all that apply)  Previous Radiation to chest  [  ] Yes      [ X ]  No  Breast Cancer                          [  ] Left     [  ]  Right    [X  ]  No  Lymph Node Dissection         [  ] Left     [  ]  Right    [  X]  No  Pacemaker or ICD                   [  ] Left     [  ]  Right    [ X ]  No  Upper Extremity DVT             [  ] Left     [  ]  Right    [  X]  No  Chronic Kidney Disease         [  ]  Yes     [  X]  No  Hemodialysis                           [  ]  Yes     [  X]  No  AV Fistula/ Graft                     [  ]  Left    [  ]  Right    [ X ]  No  Temp>101F in past 24 H       [  ]  Yes     [  X]  No  H/O PICC/Midline                   [ X ]  Yes     [  ]  No    Lab data:                        9.9    10.0  )-----------( 319      ( 06 Sep 2017 07:06 )             32.6     09-06    141  |  103  |  29<H>  ----------------------------<  135<H>  3.8   |  25  |  0.70    Ca    9.7      06 Sep 2017 07:06  Phos  3.0     09-06  Mg     2.1     09-06                I have reviewed the chart, interviewed and examined the patient and determined that this patient:  [X  ] Is a candidate for a PICC line  [  ] Is a candidate for a Midline  [  ] Is not a candidate for vascular access device (reason)    Lumens:    [  ] Single  [ X ] Double

## 2017-09-07 NOTE — PROGRESS NOTE ADULT - SUBJECTIVE AND OBJECTIVE BOX
O/N: BRAYDEN ROBERTS  9/6: ARMANDO OWEN ,  PER DEC HE IS booked for 2 weeks will be available after for closure. Possible tube study ?? PICC LINE TOMORROW O/N: BRAYDEN ROBERTS  9/6: ARMANDO OWEN ,  PER DEC HE IS booked for 2 weeks will be available after for closure. Possible tube study ?? PICC LINE TOMORROW        SUBJECTIVE: Patient seen and examined bedside by chief resident. Patient was uncomfortable last night, said she felt leakage from the vac.     heparin  Injectable 5000 Unit(s) SubCutaneous every 8 hours  piperacillin/tazobactam IVPB. 4.5 Gram(s) IV Intermittent every 6 hours  losartan 50 milliGRAM(s) Oral daily      Vital Signs Last 24 Hrs  T(C): 36.6 (07 Sep 2017 05:41), Max: 37.6 (06 Sep 2017 13:01)  T(F): 97.8 (07 Sep 2017 05:41), Max: 99.7 (06 Sep 2017 13:01)  HR: 60 (07 Sep 2017 05:41) (59 - 67)  BP: 120/79 (07 Sep 2017 05:41) (120/79 - 159/83)  BP(mean): --  RR: 17 (07 Sep 2017 05:41) (16 - 17)  SpO2: 94% (07 Sep 2017 05:41) (94% - 97%)  I&O's Detail    06 Sep 2017 07:01  -  07 Sep 2017 07:00  --------------------------------------------------------  IN:    TPN (Total Parenteral Nutrition): 768 mL  Total IN: 768 mL    OUT:    Drain: 290 mL    Drain: 100 mL    VAC (Vacuum Assisted Closure) System: 175 mL    Voided: 2250 mL  Total OUT: 2815 mL    Total NET: -2047 mL      General: NAD, resting comfortably in bed  C/V: NSR  Pulm: Nonlabored breathing, no respiratory distress  Abd: soft, NT/ND. Vac, with some serous leaking.   Extrem: WWP, no edema, SCDs in place        LABS:                        9.9    10.0  )-----------( 319      ( 06 Sep 2017 07:06 )             32.6     09-06    141  |  103  |  29<H>  ----------------------------<  135<H>  3.8   |  25  |  0.70    Ca    9.7      06 Sep 2017 07:06  Phos  3.0     09-06  Mg     2.1     09-06

## 2017-09-07 NOTE — CONSULT NOTE ADULT - SUBJECTIVE AND OBJECTIVE BOX
Surgery requested a new central venous catheter to preemptively replace the left subclavian venous catheter for nutrition. The history of bariatric surgery and anastomotic  leak is noted along with the cxr and data.  Ultrasound exam has defined a right subclavian v. thrombosis and the non-suitability of a left sided PICC. The left IJ appears  normal. The procedure was explained for consent.

## 2017-09-08 LAB
ANION GAP SERPL CALC-SCNC: 13 MMOL/L — SIGNIFICANT CHANGE UP (ref 5–17)
BUN SERPL-MCNC: 28 MG/DL — HIGH (ref 7–23)
CALCIUM SERPL-MCNC: 9.5 MG/DL — SIGNIFICANT CHANGE UP (ref 8.4–10.5)
CHLORIDE SERPL-SCNC: 104 MMOL/L — SIGNIFICANT CHANGE UP (ref 96–108)
CHOLEST SERPL-MCNC: 134 MG/DL — SIGNIFICANT CHANGE UP (ref 10–199)
CO2 SERPL-SCNC: 23 MMOL/L — SIGNIFICANT CHANGE UP (ref 22–31)
CREAT SERPL-MCNC: 0.7 MG/DL — SIGNIFICANT CHANGE UP (ref 0.5–1.3)
GLUCOSE SERPL-MCNC: 128 MG/DL — HIGH (ref 70–99)
HCT VFR BLD CALC: 32.7 % — LOW (ref 34.5–45)
HDLC SERPL-MCNC: 24 MG/DL — LOW (ref 40–125)
HGB BLD-MCNC: 10 G/DL — LOW (ref 11.5–15.5)
LIPID PNL WITH DIRECT LDL SERPL: 71 MG/DL — SIGNIFICANT CHANGE UP
MAGNESIUM SERPL-MCNC: 2.1 MG/DL — SIGNIFICANT CHANGE UP (ref 1.6–2.6)
MCHC RBC-ENTMCNC: 29 PG — SIGNIFICANT CHANGE UP (ref 27–34)
MCHC RBC-ENTMCNC: 30.6 G/DL — LOW (ref 32–36)
MCV RBC AUTO: 94.8 FL — SIGNIFICANT CHANGE UP (ref 80–100)
PHOSPHATE SERPL-MCNC: 2.8 MG/DL — SIGNIFICANT CHANGE UP (ref 2.5–4.5)
PLATELET # BLD AUTO: 288 K/UL — SIGNIFICANT CHANGE UP (ref 150–400)
POTASSIUM SERPL-MCNC: 4 MMOL/L — SIGNIFICANT CHANGE UP (ref 3.5–5.3)
POTASSIUM SERPL-SCNC: 4 MMOL/L — SIGNIFICANT CHANGE UP (ref 3.5–5.3)
RBC # BLD: 3.45 M/UL — LOW (ref 3.8–5.2)
RBC # FLD: 19 % — HIGH (ref 10.3–16.9)
SODIUM SERPL-SCNC: 140 MMOL/L — SIGNIFICANT CHANGE UP (ref 135–145)
TOTAL CHOLESTEROL/HDL RATIO MEASUREMENT: 5.6 RATIO — SIGNIFICANT CHANGE UP (ref 3.3–7.1)
TRIGL SERPL-MCNC: 196 MG/DL — HIGH (ref 10–149)
WBC # BLD: 9 K/UL — SIGNIFICANT CHANGE UP (ref 3.8–10.5)
WBC # FLD AUTO: 9 K/UL — SIGNIFICANT CHANGE UP (ref 3.8–10.5)

## 2017-09-08 PROCEDURE — 71010: CPT | Mod: 26

## 2017-09-08 RX ORDER — I.V. FAT EMULSION 20 G/100ML
50 EMULSION INTRAVENOUS
Qty: 0 | Refills: 0 | Status: DISCONTINUED | OUTPATIENT
Start: 2017-09-08 | End: 2017-09-09

## 2017-09-08 RX ORDER — DIPHENHYDRAMINE HCL 50 MG
25 CAPSULE ORAL ONCE
Qty: 0 | Refills: 0 | Status: COMPLETED | OUTPATIENT
Start: 2017-09-08 | End: 2017-09-08

## 2017-09-08 RX ORDER — HYDROMORPHONE HYDROCHLORIDE 2 MG/ML
1 INJECTION INTRAMUSCULAR; INTRAVENOUS; SUBCUTANEOUS EVERY 4 HOURS
Qty: 0 | Refills: 0 | Status: DISCONTINUED | OUTPATIENT
Start: 2017-09-10 | End: 2017-09-17

## 2017-09-08 RX ORDER — DIPHENHYDRAMINE HCL 50 MG
25 CAPSULE ORAL EVERY 6 HOURS
Qty: 0 | Refills: 0 | Status: COMPLETED | OUTPATIENT
Start: 2017-09-08 | End: 2017-09-09

## 2017-09-08 RX ORDER — HYDROMORPHONE HYDROCHLORIDE 2 MG/ML
0.5 INJECTION INTRAMUSCULAR; INTRAVENOUS; SUBCUTANEOUS EVERY 4 HOURS
Qty: 0 | Refills: 0 | Status: DISCONTINUED | OUTPATIENT
Start: 2017-09-08 | End: 2017-09-10

## 2017-09-08 RX ORDER — HYDROMORPHONE HYDROCHLORIDE 2 MG/ML
0.5 INJECTION INTRAMUSCULAR; INTRAVENOUS; SUBCUTANEOUS EVERY 4 HOURS
Qty: 0 | Refills: 0 | Status: DISCONTINUED | OUTPATIENT
Start: 2017-09-10 | End: 2017-09-17

## 2017-09-08 RX ORDER — ELECTROLYTE SOLUTION,INJ
1 VIAL (ML) INTRAVENOUS
Qty: 0 | Refills: 0 | Status: DISCONTINUED | OUTPATIENT
Start: 2017-09-08 | End: 2017-09-08

## 2017-09-08 RX ADMIN — HYDROMORPHONE HYDROCHLORIDE 0.5 MILLIGRAM(S): 2 INJECTION INTRAMUSCULAR; INTRAVENOUS; SUBCUTANEOUS at 16:30

## 2017-09-08 RX ADMIN — HEPARIN SODIUM 5000 UNIT(S): 5000 INJECTION INTRAVENOUS; SUBCUTANEOUS at 14:37

## 2017-09-08 RX ADMIN — CALCITRIOL 1 MICROGRAM(S): 0.5 CAPSULE ORAL at 11:12

## 2017-09-08 RX ADMIN — PIPERACILLIN AND TAZOBACTAM 200 GRAM(S): 4; .5 INJECTION, POWDER, LYOPHILIZED, FOR SOLUTION INTRAVENOUS at 00:20

## 2017-09-08 RX ADMIN — HEPARIN SODIUM 5000 UNIT(S): 5000 INJECTION INTRAVENOUS; SUBCUTANEOUS at 10:20

## 2017-09-08 RX ADMIN — HYDROMORPHONE HYDROCHLORIDE 1 MILLIGRAM(S): 2 INJECTION INTRAMUSCULAR; INTRAVENOUS; SUBCUTANEOUS at 17:49

## 2017-09-08 RX ADMIN — Medication 25 MILLIGRAM(S): at 02:28

## 2017-09-08 RX ADMIN — Medication 25 MILLIGRAM(S): at 14:37

## 2017-09-08 RX ADMIN — HYDROMORPHONE HYDROCHLORIDE 0.25 MILLIGRAM(S): 2 INJECTION INTRAMUSCULAR; INTRAVENOUS; SUBCUTANEOUS at 08:26

## 2017-09-08 RX ADMIN — HEPARIN SODIUM 5000 UNIT(S): 5000 INJECTION INTRAVENOUS; SUBCUTANEOUS at 06:13

## 2017-09-08 RX ADMIN — NYSTATIN CREAM 1 APPLICATION(S): 100000 CREAM TOPICAL at 17:50

## 2017-09-08 RX ADMIN — PIPERACILLIN AND TAZOBACTAM 200 GRAM(S): 4; .5 INJECTION, POWDER, LYOPHILIZED, FOR SOLUTION INTRAVENOUS at 11:11

## 2017-09-08 RX ADMIN — PIPERACILLIN AND TAZOBACTAM 200 GRAM(S): 4; .5 INJECTION, POWDER, LYOPHILIZED, FOR SOLUTION INTRAVENOUS at 17:48

## 2017-09-08 RX ADMIN — Medication 1 APPLICATION(S): at 11:12

## 2017-09-08 RX ADMIN — HYDROMORPHONE HYDROCHLORIDE 1 MILLIGRAM(S): 2 INJECTION INTRAMUSCULAR; INTRAVENOUS; SUBCUTANEOUS at 10:20

## 2017-09-08 RX ADMIN — Medication 25 MILLIGRAM(S): at 17:48

## 2017-09-08 RX ADMIN — HYDROMORPHONE HYDROCHLORIDE 0.5 MILLIGRAM(S): 2 INJECTION INTRAMUSCULAR; INTRAVENOUS; SUBCUTANEOUS at 16:18

## 2017-09-08 RX ADMIN — Medication 1 EACH: at 17:49

## 2017-09-08 RX ADMIN — Medication 25 MILLIGRAM(S): at 04:22

## 2017-09-08 RX ADMIN — PIPERACILLIN AND TAZOBACTAM 200 GRAM(S): 4; .5 INJECTION, POWDER, LYOPHILIZED, FOR SOLUTION INTRAVENOUS at 06:13

## 2017-09-08 RX ADMIN — HYDROMORPHONE HYDROCHLORIDE 0.5 MILLIGRAM(S): 2 INJECTION INTRAMUSCULAR; INTRAVENOUS; SUBCUTANEOUS at 10:55

## 2017-09-08 RX ADMIN — HYDROMORPHONE HYDROCHLORIDE 1 MILLIGRAM(S): 2 INJECTION INTRAMUSCULAR; INTRAVENOUS; SUBCUTANEOUS at 18:15

## 2017-09-08 RX ADMIN — HYDROMORPHONE HYDROCHLORIDE 1 MILLIGRAM(S): 2 INJECTION INTRAMUSCULAR; INTRAVENOUS; SUBCUTANEOUS at 06:43

## 2017-09-08 RX ADMIN — HYDROMORPHONE HYDROCHLORIDE 0.25 MILLIGRAM(S): 2 INJECTION INTRAMUSCULAR; INTRAVENOUS; SUBCUTANEOUS at 08:29

## 2017-09-08 RX ADMIN — HYDROMORPHONE HYDROCHLORIDE 1 MILLIGRAM(S): 2 INJECTION INTRAMUSCULAR; INTRAVENOUS; SUBCUTANEOUS at 22:40

## 2017-09-08 RX ADMIN — Medication 100 UNIT(S): at 11:11

## 2017-09-08 RX ADMIN — HYDROMORPHONE HYDROCHLORIDE 1 MILLIGRAM(S): 2 INJECTION INTRAMUSCULAR; INTRAVENOUS; SUBCUTANEOUS at 02:28

## 2017-09-08 RX ADMIN — HYDROMORPHONE HYDROCHLORIDE 1 MILLIGRAM(S): 2 INJECTION INTRAMUSCULAR; INTRAVENOUS; SUBCUTANEOUS at 15:00

## 2017-09-08 RX ADMIN — Medication 25 MILLIGRAM(S): at 10:54

## 2017-09-08 RX ADMIN — HYDROMORPHONE HYDROCHLORIDE 1 MILLIGRAM(S): 2 INJECTION INTRAMUSCULAR; INTRAVENOUS; SUBCUTANEOUS at 11:00

## 2017-09-08 RX ADMIN — HYDROMORPHONE HYDROCHLORIDE 1 MILLIGRAM(S): 2 INJECTION INTRAMUSCULAR; INTRAVENOUS; SUBCUTANEOUS at 02:43

## 2017-09-08 RX ADMIN — HYDROMORPHONE HYDROCHLORIDE 0.25 MILLIGRAM(S): 2 INJECTION INTRAMUSCULAR; INTRAVENOUS; SUBCUTANEOUS at 04:24

## 2017-09-08 RX ADMIN — HYDROMORPHONE HYDROCHLORIDE 0.5 MILLIGRAM(S): 2 INJECTION INTRAMUSCULAR; INTRAVENOUS; SUBCUTANEOUS at 11:15

## 2017-09-08 RX ADMIN — HYDROMORPHONE HYDROCHLORIDE 0.25 MILLIGRAM(S): 2 INJECTION INTRAMUSCULAR; INTRAVENOUS; SUBCUTANEOUS at 04:09

## 2017-09-08 RX ADMIN — HYDROMORPHONE HYDROCHLORIDE 1 MILLIGRAM(S): 2 INJECTION INTRAMUSCULAR; INTRAVENOUS; SUBCUTANEOUS at 22:20

## 2017-09-08 RX ADMIN — HYDROMORPHONE HYDROCHLORIDE 1 MILLIGRAM(S): 2 INJECTION INTRAMUSCULAR; INTRAVENOUS; SUBCUTANEOUS at 06:28

## 2017-09-08 RX ADMIN — HYDROMORPHONE HYDROCHLORIDE 1 MILLIGRAM(S): 2 INJECTION INTRAMUSCULAR; INTRAVENOUS; SUBCUTANEOUS at 14:37

## 2017-09-08 RX ADMIN — Medication 2 MILLIGRAM(S): at 01:09

## 2017-09-08 RX ADMIN — NYSTATIN CREAM 1 APPLICATION(S): 100000 CREAM TOPICAL at 06:14

## 2017-09-08 RX ADMIN — Medication 25 MILLIGRAM(S): at 08:25

## 2017-09-08 RX ADMIN — PANTOPRAZOLE SODIUM 40 MILLIGRAM(S): 20 TABLET, DELAYED RELEASE ORAL at 11:11

## 2017-09-08 RX ADMIN — ESCITALOPRAM OXALATE 20 MILLIGRAM(S): 10 TABLET, FILM COATED ORAL at 22:20

## 2017-09-08 RX ADMIN — LOSARTAN POTASSIUM 50 MILLIGRAM(S): 100 TABLET, FILM COATED ORAL at 06:13

## 2017-09-08 NOTE — PROGRESS NOTE ADULT - SUBJECTIVE AND OBJECTIVE BOX
O/N: L IJ placed @ 1am, BRAYDEN ROBERTS  9/7: Call Dr. Burns to exchange subclavian. He will place left IJ. tonight. O/N: L IJ placed @ 1am, BRAYDEN ROBERTS  9/7: Call Dr. Burns to exchange subclavian. He will place left IJ. tonight.     STATUS POST:       SUBJECTIVE: Patient seen and examined bedside by chief resident. Wound vac changed by ostomy care.     heparin  Injectable 5000 Unit(s) SubCutaneous every 8 hours  piperacillin/tazobactam IVPB. 4.5 Gram(s) IV Intermittent every 6 hours  losartan 50 milliGRAM(s) Oral daily  heparin  flush 100 Units/mL Injectable 100 Unit(s) IV Push every other day      Vital Signs Last 24 Hrs  T(C): 36.8 (08 Sep 2017 05:14), Max: 37.2 (07 Sep 2017 20:55)  T(F): 98.2 (08 Sep 2017 05:14), Max: 98.9 (07 Sep 2017 20:55)  HR: 60 (08 Sep 2017 05:14) (55 - 61)  BP: 148/86 (08 Sep 2017 05:14) (115/77 - 148/91)  BP(mean): --  RR: 16 (08 Sep 2017 05:14) (16 - 17)  SpO2: 95% (08 Sep 2017 05:14) (95% - 97%)  I&O's Detail    07 Sep 2017 07:01  -  08 Sep 2017 07:00  --------------------------------------------------------  IN:    Fat Emulsion 20%: 208 mL    Oral Fluid: 200 mL    Solution: 200 mL    TPN (Total Parenteral Nutrition): 768 mL  Total IN: 1376 mL    OUT:    Drain: 250 mL    Drain: 150 mL    VAC (Vacuum Assisted Closure) System: 100 mL    Voided: 1500 mL  Total OUT: 2000 mL    Total NET: -624 mL      08 Sep 2017 07:01  -  08 Sep 2017 10:51  --------------------------------------------------------  IN:    Fat Emulsion 20%: 20.8 mL    TPN (Total Parenteral Nutrition): 64 mL  Total IN: 84.8 mL    OUT:  Total OUT: 0 mL    Total NET: 84.8 mL          General: NAD, resting comfortably in bed  C/V: NSR  Pulm: Nonlabored breathing, no respiratory distress  Abd: soft, NT/ND.  Extrem: WWP, no edema, SCDs in place        LABS:                        10.0   9.0   )-----------( 288      ( 08 Sep 2017 06:31 )             32.7     09-08    140  |  104  |  28<H>  ----------------------------<  128<H>  4.0   |  23  |  0.70    Ca    9.5      08 Sep 2017 06:31  Phos  2.8     09-08  Mg     2.1     09-08            RADIOLOGY & ADDITIONAL STUDIES:

## 2017-09-08 NOTE — ADVANCED PRACTICE NURSE CONSULT - ASSESSMENT
Wound and fistula evaluated by Dr Brady. One red rubber catheter remains in place in fistula. Dressing leaking on left lateral side. Wall suction not working.  Re-adjusted wall suction, currently suctioning moderate amount of yellow effluent. Fungal rash noted to left abdominal quadrant. Applied nystatin powder and sealed with Cavilon liquid skin barrier. Wound with granulation tissue, measuring 16.5 cm x 15 cm x 0.2 cm; small amount of white frothy effluent noted in center of wound bed.  Stomahesive barriers placed around periwound for protection, isolated fistula using isolator strip. Applied Stoma powder to wound bed, Adaptic Touch, white foam over center of wound where frothy effluent was observed, covered with Black Granufoam, and VAC dressing at 125 mm/Hg.  Covered fistula with Aquacel Extra, then applied one piece Capo pouch placed over CHECO drainage attached to wall suction on low as per Dr Brady's instructions. Team to continue to follow patient and change VAC dressing. House staff present during dressing change

## 2017-09-08 NOTE — ADVANCED PRACTICE NURSE CONSULT - RECOMMEDATIONS
House staff to follow patient and change VAC dressing. House staff to follow patient and change VAC dressing. Dr Brady aware.

## 2017-09-08 NOTE — PROGRESS NOTE ADULT - ASSESSMENT
57 F s/p  SBR, fistula resection, esophagojejunostomy revision w/ post-op course complicated by RTOR for distal anastomosis breakdown, ATN, acute respiratory failure, & septic shock.   NPO/TPN/IVF  Pain/nausea control  ISS  Zosyn  L IJ  Nystatin powder  SCDs, SQH, OOBA  Drains: Malecot in enterotomy site, wound vac to midline  Wounds: Midline xiphoid to pubis incision; DTI & stage 2 pressure ulcer noted.

## 2017-09-09 LAB
ANION GAP SERPL CALC-SCNC: 13 MMOL/L — SIGNIFICANT CHANGE UP (ref 5–17)
BUN SERPL-MCNC: 27 MG/DL — HIGH (ref 7–23)
CALCIUM SERPL-MCNC: 9.5 MG/DL — SIGNIFICANT CHANGE UP (ref 8.4–10.5)
CHLORIDE SERPL-SCNC: 101 MMOL/L — SIGNIFICANT CHANGE UP (ref 96–108)
CO2 SERPL-SCNC: 23 MMOL/L — SIGNIFICANT CHANGE UP (ref 22–31)
CREAT SERPL-MCNC: 0.7 MG/DL — SIGNIFICANT CHANGE UP (ref 0.5–1.3)
GLUCOSE SERPL-MCNC: 144 MG/DL — HIGH (ref 70–99)
HCT VFR BLD CALC: 32.9 % — LOW (ref 34.5–45)
HGB BLD-MCNC: 10.4 G/DL — LOW (ref 11.5–15.5)
MAGNESIUM SERPL-MCNC: 2 MG/DL — SIGNIFICANT CHANGE UP (ref 1.6–2.6)
MCHC RBC-ENTMCNC: 29.9 PG — SIGNIFICANT CHANGE UP (ref 27–34)
MCHC RBC-ENTMCNC: 31.6 G/DL — LOW (ref 32–36)
MCV RBC AUTO: 94.5 FL — SIGNIFICANT CHANGE UP (ref 80–100)
PHOSPHATE SERPL-MCNC: 2.8 MG/DL — SIGNIFICANT CHANGE UP (ref 2.5–4.5)
PLATELET # BLD AUTO: 285 K/UL — SIGNIFICANT CHANGE UP (ref 150–400)
POTASSIUM SERPL-MCNC: 3.5 MMOL/L — SIGNIFICANT CHANGE UP (ref 3.5–5.3)
POTASSIUM SERPL-SCNC: 3.5 MMOL/L — SIGNIFICANT CHANGE UP (ref 3.5–5.3)
RBC # BLD: 3.48 M/UL — LOW (ref 3.8–5.2)
RBC # FLD: 19.2 % — HIGH (ref 10.3–16.9)
SODIUM SERPL-SCNC: 137 MMOL/L — SIGNIFICANT CHANGE UP (ref 135–145)
WBC # BLD: 6.6 K/UL — SIGNIFICANT CHANGE UP (ref 3.8–10.5)
WBC # FLD AUTO: 6.6 K/UL — SIGNIFICANT CHANGE UP (ref 3.8–10.5)

## 2017-09-09 RX ORDER — POTASSIUM CHLORIDE 20 MEQ
20 PACKET (EA) ORAL ONCE
Qty: 0 | Refills: 0 | Status: COMPLETED | OUTPATIENT
Start: 2017-09-09 | End: 2017-09-10

## 2017-09-09 RX ORDER — ELECTROLYTE SOLUTION,INJ
1 VIAL (ML) INTRAVENOUS
Qty: 0 | Refills: 0 | Status: DISCONTINUED | OUTPATIENT
Start: 2017-09-09 | End: 2017-09-09

## 2017-09-09 RX ADMIN — HYDROMORPHONE HYDROCHLORIDE 0.5 MILLIGRAM(S): 2 INJECTION INTRAMUSCULAR; INTRAVENOUS; SUBCUTANEOUS at 04:10

## 2017-09-09 RX ADMIN — PIPERACILLIN AND TAZOBACTAM 200 GRAM(S): 4; .5 INJECTION, POWDER, LYOPHILIZED, FOR SOLUTION INTRAVENOUS at 00:00

## 2017-09-09 RX ADMIN — HYDROMORPHONE HYDROCHLORIDE 0.5 MILLIGRAM(S): 2 INJECTION INTRAMUSCULAR; INTRAVENOUS; SUBCUTANEOUS at 19:27

## 2017-09-09 RX ADMIN — Medication 25 MILLIGRAM(S): at 11:47

## 2017-09-09 RX ADMIN — Medication 25 MILLIGRAM(S): at 14:17

## 2017-09-09 RX ADMIN — Medication 25 MILLIGRAM(S): at 22:33

## 2017-09-09 RX ADMIN — HYDROMORPHONE HYDROCHLORIDE 0.5 MILLIGRAM(S): 2 INJECTION INTRAMUSCULAR; INTRAVENOUS; SUBCUTANEOUS at 07:45

## 2017-09-09 RX ADMIN — Medication 25 MILLIGRAM(S): at 07:00

## 2017-09-09 RX ADMIN — HYDROMORPHONE HYDROCHLORIDE 1 MILLIGRAM(S): 2 INJECTION INTRAMUSCULAR; INTRAVENOUS; SUBCUTANEOUS at 06:30

## 2017-09-09 RX ADMIN — PIPERACILLIN AND TAZOBACTAM 200 GRAM(S): 4; .5 INJECTION, POWDER, LYOPHILIZED, FOR SOLUTION INTRAVENOUS at 12:39

## 2017-09-09 RX ADMIN — HYDROMORPHONE HYDROCHLORIDE 1 MILLIGRAM(S): 2 INJECTION INTRAMUSCULAR; INTRAVENOUS; SUBCUTANEOUS at 11:47

## 2017-09-09 RX ADMIN — Medication 25 MILLIGRAM(S): at 17:48

## 2017-09-09 RX ADMIN — Medication 25 MILLIGRAM(S): at 02:30

## 2017-09-09 RX ADMIN — HYDROMORPHONE HYDROCHLORIDE 0.5 MILLIGRAM(S): 2 INJECTION INTRAMUSCULAR; INTRAVENOUS; SUBCUTANEOUS at 00:00

## 2017-09-09 RX ADMIN — HYDROMORPHONE HYDROCHLORIDE 0.5 MILLIGRAM(S): 2 INJECTION INTRAMUSCULAR; INTRAVENOUS; SUBCUTANEOUS at 00:15

## 2017-09-09 RX ADMIN — HYDROMORPHONE HYDROCHLORIDE 1 MILLIGRAM(S): 2 INJECTION INTRAMUSCULAR; INTRAVENOUS; SUBCUTANEOUS at 17:15

## 2017-09-09 RX ADMIN — LOSARTAN POTASSIUM 50 MILLIGRAM(S): 100 TABLET, FILM COATED ORAL at 06:00

## 2017-09-09 RX ADMIN — HEPARIN SODIUM 5000 UNIT(S): 5000 INJECTION INTRAVENOUS; SUBCUTANEOUS at 21:33

## 2017-09-09 RX ADMIN — HYDROMORPHONE HYDROCHLORIDE 0.5 MILLIGRAM(S): 2 INJECTION INTRAMUSCULAR; INTRAVENOUS; SUBCUTANEOUS at 19:42

## 2017-09-09 RX ADMIN — ESCITALOPRAM OXALATE 20 MILLIGRAM(S): 10 TABLET, FILM COATED ORAL at 21:33

## 2017-09-09 RX ADMIN — HYDROMORPHONE HYDROCHLORIDE 0.5 MILLIGRAM(S): 2 INJECTION INTRAMUSCULAR; INTRAVENOUS; SUBCUTANEOUS at 08:00

## 2017-09-09 RX ADMIN — Medication 64 EACH: at 19:34

## 2017-09-09 RX ADMIN — HYDROMORPHONE HYDROCHLORIDE 1 MILLIGRAM(S): 2 INJECTION INTRAMUSCULAR; INTRAVENOUS; SUBCUTANEOUS at 22:00

## 2017-09-09 RX ADMIN — HYDROMORPHONE HYDROCHLORIDE 0.5 MILLIGRAM(S): 2 INJECTION INTRAMUSCULAR; INTRAVENOUS; SUBCUTANEOUS at 04:25

## 2017-09-09 RX ADMIN — I.V. FAT EMULSION 20.83 GRAM(S): 20 EMULSION INTRAVENOUS at 13:27

## 2017-09-09 RX ADMIN — PIPERACILLIN AND TAZOBACTAM 200 GRAM(S): 4; .5 INJECTION, POWDER, LYOPHILIZED, FOR SOLUTION INTRAVENOUS at 19:16

## 2017-09-09 RX ADMIN — PIPERACILLIN AND TAZOBACTAM 200 GRAM(S): 4; .5 INJECTION, POWDER, LYOPHILIZED, FOR SOLUTION INTRAVENOUS at 06:00

## 2017-09-09 RX ADMIN — HYDROMORPHONE HYDROCHLORIDE 1 MILLIGRAM(S): 2 INJECTION INTRAMUSCULAR; INTRAVENOUS; SUBCUTANEOUS at 12:00

## 2017-09-09 RX ADMIN — Medication 1 APPLICATION(S): at 12:39

## 2017-09-09 RX ADMIN — HYDROMORPHONE HYDROCHLORIDE 1 MILLIGRAM(S): 2 INJECTION INTRAMUSCULAR; INTRAVENOUS; SUBCUTANEOUS at 21:33

## 2017-09-09 RX ADMIN — Medication 2 MILLIGRAM(S): at 00:00

## 2017-09-09 RX ADMIN — NYSTATIN CREAM 1 APPLICATION(S): 100000 CREAM TOPICAL at 06:00

## 2017-09-09 RX ADMIN — HYDROMORPHONE HYDROCHLORIDE 1 MILLIGRAM(S): 2 INJECTION INTRAMUSCULAR; INTRAVENOUS; SUBCUTANEOUS at 02:45

## 2017-09-09 RX ADMIN — NYSTATIN CREAM 1 APPLICATION(S): 100000 CREAM TOPICAL at 19:16

## 2017-09-09 RX ADMIN — PANTOPRAZOLE SODIUM 40 MILLIGRAM(S): 20 TABLET, DELAYED RELEASE ORAL at 12:39

## 2017-09-09 RX ADMIN — HYDROMORPHONE HYDROCHLORIDE 1 MILLIGRAM(S): 2 INJECTION INTRAMUSCULAR; INTRAVENOUS; SUBCUTANEOUS at 02:30

## 2017-09-09 RX ADMIN — HYDROMORPHONE HYDROCHLORIDE 0.5 MILLIGRAM(S): 2 INJECTION INTRAMUSCULAR; INTRAVENOUS; SUBCUTANEOUS at 14:17

## 2017-09-09 RX ADMIN — HEPARIN SODIUM 5000 UNIT(S): 5000 INJECTION INTRAVENOUS; SUBCUTANEOUS at 14:17

## 2017-09-09 RX ADMIN — HEPARIN SODIUM 5000 UNIT(S): 5000 INJECTION INTRAVENOUS; SUBCUTANEOUS at 06:00

## 2017-09-09 RX ADMIN — HYDROMORPHONE HYDROCHLORIDE 0.5 MILLIGRAM(S): 2 INJECTION INTRAMUSCULAR; INTRAVENOUS; SUBCUTANEOUS at 14:45

## 2017-09-09 RX ADMIN — HYDROMORPHONE HYDROCHLORIDE 1 MILLIGRAM(S): 2 INJECTION INTRAMUSCULAR; INTRAVENOUS; SUBCUTANEOUS at 06:45

## 2017-09-09 RX ADMIN — HYDROMORPHONE HYDROCHLORIDE 1 MILLIGRAM(S): 2 INJECTION INTRAMUSCULAR; INTRAVENOUS; SUBCUTANEOUS at 17:30

## 2017-09-09 NOTE — PROGRESS NOTE ADULT - SUBJECTIVE AND OBJECTIVE BOX
STATUS POST:       SUBJECTIVE: Patient seen and examined bedside by chief resident. Tolerated broth, wants more     heparin  Injectable 5000 Unit(s) SubCutaneous every 8 hours  piperacillin/tazobactam IVPB. 4.5 Gram(s) IV Intermittent every 6 hours  losartan 50 milliGRAM(s) Oral daily  heparin  flush 100 Units/mL Injectable 100 Unit(s) IV Push every other day      Vital Signs Last 24 Hrs  T(C): 36.8 (09 Sep 2017 12:35), Max: 36.9 (08 Sep 2017 18:18)  T(F): 98.2 (09 Sep 2017 12:35), Max: 98.4 (08 Sep 2017 18:18)  HR: 56 (09 Sep 2017 12:35) (56 - 63)  BP: 154/67 (09 Sep 2017 12:35) (139/96 - 154/81)  BP(mean): --  RR: 18 (09 Sep 2017 12:35) (16 - 18)  SpO2: 96% (09 Sep 2017 12:35) (95% - 97%)  I&O's Detail    08 Sep 2017 07:01  -  09 Sep 2017 07:00  --------------------------------------------------------  IN:    Fat Emulsion 20%: 83.2 mL    TPN (Total Parenteral Nutrition): 1088 mL  Total IN: 1171.2 mL    OUT:    Drain: 150 mL    Drain: 100 mL    VAC (Vacuum Assisted Closure) System: 85 mL    Voided: 900 mL  Total OUT: 1235 mL    Total NET: -63.8 mL      09 Sep 2017 07:01  -  09 Sep 2017 15:41  --------------------------------------------------------  IN:    Fat Emulsion 20%: 166.4 mL    TPN (Total Parenteral Nutrition): 512 mL  Total IN: 678.4 mL    OUT:    Drain: 100 mL    VAC (Vacuum Assisted Closure) System: 100 mL    Voided: 1150 mL  Total OUT: 1350 mL    Total NET: -671.6 mL          General: NAD, resting comfortably in bed  C/V: NSR  Pulm: Nonlabored breathing, no respiratory distress  Abd: soft, NT/ND. Vac in palce  Extrem: WWP, no edema, SCDs in place        LABS:                        10.4   6.6   )-----------( 285      ( 09 Sep 2017 08:00 )             32.9     09-09    137  |  101  |  27<H>  ----------------------------<  144<H>  3.5   |  23  |  0.70    Ca    9.5      09 Sep 2017 08:00  Phos  2.8     09-09  Mg     2.0     09-09            RADIOLOGY & ADDITIONAL STUDIES:

## 2017-09-10 LAB
ALBUMIN SERPL ELPH-MCNC: 3.2 G/DL — LOW (ref 3.3–5)
ALP SERPL-CCNC: 146 U/L — HIGH (ref 40–120)
ALT FLD-CCNC: 13 U/L — SIGNIFICANT CHANGE UP (ref 10–45)
ANION GAP SERPL CALC-SCNC: 12 MMOL/L — SIGNIFICANT CHANGE UP (ref 5–17)
AST SERPL-CCNC: 14 U/L — SIGNIFICANT CHANGE UP (ref 10–40)
BASOPHILS NFR BLD AUTO: 0.4 % — SIGNIFICANT CHANGE UP (ref 0–2)
BILIRUB SERPL-MCNC: 1.2 MG/DL — SIGNIFICANT CHANGE UP (ref 0.2–1.2)
BUN SERPL-MCNC: 28 MG/DL — HIGH (ref 7–23)
CALCIUM SERPL-MCNC: 9.6 MG/DL — SIGNIFICANT CHANGE UP (ref 8.4–10.5)
CHLORIDE SERPL-SCNC: 104 MMOL/L — SIGNIFICANT CHANGE UP (ref 96–108)
CO2 SERPL-SCNC: 24 MMOL/L — SIGNIFICANT CHANGE UP (ref 22–31)
CREAT SERPL-MCNC: 0.7 MG/DL — SIGNIFICANT CHANGE UP (ref 0.5–1.3)
EOSINOPHIL NFR BLD AUTO: 6.4 % — HIGH (ref 0–6)
GLUCOSE SERPL-MCNC: 154 MG/DL — HIGH (ref 70–99)
HCT VFR BLD CALC: 33.1 % — LOW (ref 34.5–45)
HGB BLD-MCNC: 10.2 G/DL — LOW (ref 11.5–15.5)
LYMPHOCYTES # BLD AUTO: 27.3 % — SIGNIFICANT CHANGE UP (ref 13–44)
MAGNESIUM SERPL-MCNC: 2 MG/DL — SIGNIFICANT CHANGE UP (ref 1.6–2.6)
MCHC RBC-ENTMCNC: 29.3 PG — SIGNIFICANT CHANGE UP (ref 27–34)
MCHC RBC-ENTMCNC: 30.8 G/DL — LOW (ref 32–36)
MCV RBC AUTO: 95.1 FL — SIGNIFICANT CHANGE UP (ref 80–100)
MONOCYTES NFR BLD AUTO: 7.9 % — SIGNIFICANT CHANGE UP (ref 2–14)
NEUTROPHILS NFR BLD AUTO: 58 % — SIGNIFICANT CHANGE UP (ref 43–77)
PLATELET # BLD AUTO: 257 K/UL — SIGNIFICANT CHANGE UP (ref 150–400)
POTASSIUM SERPL-MCNC: 3.8 MMOL/L — SIGNIFICANT CHANGE UP (ref 3.5–5.3)
POTASSIUM SERPL-SCNC: 3.8 MMOL/L — SIGNIFICANT CHANGE UP (ref 3.5–5.3)
PROT SERPL-MCNC: 7.2 G/DL — SIGNIFICANT CHANGE UP (ref 6–8.3)
RBC # BLD: 3.48 M/UL — LOW (ref 3.8–5.2)
RBC # FLD: 19.3 % — HIGH (ref 10.3–16.9)
SODIUM SERPL-SCNC: 140 MMOL/L — SIGNIFICANT CHANGE UP (ref 135–145)
TRIGL SERPL-MCNC: 109 MG/DL — SIGNIFICANT CHANGE UP (ref 10–149)
WBC # BLD: 6.7 K/UL — SIGNIFICANT CHANGE UP (ref 3.8–10.5)
WBC # FLD AUTO: 6.7 K/UL — SIGNIFICANT CHANGE UP (ref 3.8–10.5)

## 2017-09-10 RX ORDER — ELECTROLYTE SOLUTION,INJ
1 VIAL (ML) INTRAVENOUS
Qty: 0 | Refills: 0 | Status: DISCONTINUED | OUTPATIENT
Start: 2017-09-10 | End: 2017-09-10

## 2017-09-10 RX ORDER — POTASSIUM CHLORIDE 20 MEQ
20 PACKET (EA) ORAL ONCE
Qty: 0 | Refills: 0 | Status: COMPLETED | OUTPATIENT
Start: 2017-09-10 | End: 2017-09-10

## 2017-09-10 RX ORDER — HYDROMORPHONE HYDROCHLORIDE 2 MG/ML
0.25 INJECTION INTRAMUSCULAR; INTRAVENOUS; SUBCUTANEOUS ONCE
Qty: 0 | Refills: 0 | Status: DISCONTINUED | OUTPATIENT
Start: 2017-09-10 | End: 2017-09-12

## 2017-09-10 RX ORDER — DIPHENHYDRAMINE HCL 50 MG
25 CAPSULE ORAL ONCE
Qty: 0 | Refills: 0 | Status: COMPLETED | OUTPATIENT
Start: 2017-09-10 | End: 2017-09-11

## 2017-09-10 RX ORDER — DIPHENHYDRAMINE HCL 50 MG
25 CAPSULE ORAL ONCE
Qty: 0 | Refills: 0 | Status: COMPLETED | OUTPATIENT
Start: 2017-09-10 | End: 2017-09-10

## 2017-09-10 RX ADMIN — PIPERACILLIN AND TAZOBACTAM 200 GRAM(S): 4; .5 INJECTION, POWDER, LYOPHILIZED, FOR SOLUTION INTRAVENOUS at 12:23

## 2017-09-10 RX ADMIN — ESCITALOPRAM OXALATE 20 MILLIGRAM(S): 10 TABLET, FILM COATED ORAL at 23:02

## 2017-09-10 RX ADMIN — HYDROMORPHONE HYDROCHLORIDE 0.5 MILLIGRAM(S): 2 INJECTION INTRAMUSCULAR; INTRAVENOUS; SUBCUTANEOUS at 15:56

## 2017-09-10 RX ADMIN — LOSARTAN POTASSIUM 50 MILLIGRAM(S): 100 TABLET, FILM COATED ORAL at 06:01

## 2017-09-10 RX ADMIN — HYDROMORPHONE HYDROCHLORIDE 0.5 MILLIGRAM(S): 2 INJECTION INTRAMUSCULAR; INTRAVENOUS; SUBCUTANEOUS at 06:30

## 2017-09-10 RX ADMIN — HYDROMORPHONE HYDROCHLORIDE 1 MILLIGRAM(S): 2 INJECTION INTRAMUSCULAR; INTRAVENOUS; SUBCUTANEOUS at 17:25

## 2017-09-10 RX ADMIN — PIPERACILLIN AND TAZOBACTAM 200 GRAM(S): 4; .5 INJECTION, POWDER, LYOPHILIZED, FOR SOLUTION INTRAVENOUS at 18:00

## 2017-09-10 RX ADMIN — Medication 20 MILLIEQUIVALENT(S): at 12:24

## 2017-09-10 RX ADMIN — Medication 10 MILLIGRAM(S): at 12:23

## 2017-09-10 RX ADMIN — HYDROMORPHONE HYDROCHLORIDE 0.5 MILLIGRAM(S): 2 INJECTION INTRAMUSCULAR; INTRAVENOUS; SUBCUTANEOUS at 10:33

## 2017-09-10 RX ADMIN — HYDROMORPHONE HYDROCHLORIDE 0.5 MILLIGRAM(S): 2 INJECTION INTRAMUSCULAR; INTRAVENOUS; SUBCUTANEOUS at 07:00

## 2017-09-10 RX ADMIN — NYSTATIN CREAM 1 APPLICATION(S): 100000 CREAM TOPICAL at 18:07

## 2017-09-10 RX ADMIN — HYDROMORPHONE HYDROCHLORIDE 1 MILLIGRAM(S): 2 INJECTION INTRAMUSCULAR; INTRAVENOUS; SUBCUTANEOUS at 14:46

## 2017-09-10 RX ADMIN — HYDROMORPHONE HYDROCHLORIDE 1 MILLIGRAM(S): 2 INJECTION INTRAMUSCULAR; INTRAVENOUS; SUBCUTANEOUS at 13:20

## 2017-09-10 RX ADMIN — Medication 25 MILLIGRAM(S): at 13:20

## 2017-09-10 RX ADMIN — Medication 25 MILLIGRAM(S): at 03:53

## 2017-09-10 RX ADMIN — PANTOPRAZOLE SODIUM 40 MILLIGRAM(S): 20 TABLET, DELAYED RELEASE ORAL at 12:23

## 2017-09-10 RX ADMIN — Medication 2 MILLIGRAM(S): at 00:01

## 2017-09-10 RX ADMIN — HEPARIN SODIUM 5000 UNIT(S): 5000 INJECTION INTRAVENOUS; SUBCUTANEOUS at 23:02

## 2017-09-10 RX ADMIN — Medication 100 UNIT(S): at 12:25

## 2017-09-10 RX ADMIN — HYDROMORPHONE HYDROCHLORIDE 1 MILLIGRAM(S): 2 INJECTION INTRAMUSCULAR; INTRAVENOUS; SUBCUTANEOUS at 23:30

## 2017-09-10 RX ADMIN — PIPERACILLIN AND TAZOBACTAM 200 GRAM(S): 4; .5 INJECTION, POWDER, LYOPHILIZED, FOR SOLUTION INTRAVENOUS at 06:00

## 2017-09-10 RX ADMIN — PIPERACILLIN AND TAZOBACTAM 200 GRAM(S): 4; .5 INJECTION, POWDER, LYOPHILIZED, FOR SOLUTION INTRAVENOUS at 00:01

## 2017-09-10 RX ADMIN — HYDROMORPHONE HYDROCHLORIDE 1 MILLIGRAM(S): 2 INJECTION INTRAMUSCULAR; INTRAVENOUS; SUBCUTANEOUS at 07:53

## 2017-09-10 RX ADMIN — HEPARIN SODIUM 5000 UNIT(S): 5000 INJECTION INTRAVENOUS; SUBCUTANEOUS at 14:49

## 2017-09-10 RX ADMIN — HYDROMORPHONE HYDROCHLORIDE 0.5 MILLIGRAM(S): 2 INJECTION INTRAMUSCULAR; INTRAVENOUS; SUBCUTANEOUS at 11:03

## 2017-09-10 RX ADMIN — HYDROMORPHONE HYDROCHLORIDE 0.5 MILLIGRAM(S): 2 INJECTION INTRAMUSCULAR; INTRAVENOUS; SUBCUTANEOUS at 20:00

## 2017-09-10 RX ADMIN — Medication 25 MILLIGRAM(S): at 07:53

## 2017-09-10 RX ADMIN — HYDROMORPHONE HYDROCHLORIDE 1 MILLIGRAM(S): 2 INJECTION INTRAMUSCULAR; INTRAVENOUS; SUBCUTANEOUS at 08:03

## 2017-09-10 RX ADMIN — HYDROMORPHONE HYDROCHLORIDE 0.5 MILLIGRAM(S): 2 INJECTION INTRAMUSCULAR; INTRAVENOUS; SUBCUTANEOUS at 15:23

## 2017-09-10 RX ADMIN — HYDROMORPHONE HYDROCHLORIDE 0.5 MILLIGRAM(S): 2 INJECTION INTRAMUSCULAR; INTRAVENOUS; SUBCUTANEOUS at 19:34

## 2017-09-10 RX ADMIN — NYSTATIN CREAM 1 APPLICATION(S): 100000 CREAM TOPICAL at 06:02

## 2017-09-10 RX ADMIN — HYDROMORPHONE HYDROCHLORIDE 1 MILLIGRAM(S): 2 INJECTION INTRAMUSCULAR; INTRAVENOUS; SUBCUTANEOUS at 03:17

## 2017-09-10 RX ADMIN — HEPARIN SODIUM 5000 UNIT(S): 5000 INJECTION INTRAVENOUS; SUBCUTANEOUS at 06:01

## 2017-09-10 RX ADMIN — HYDROMORPHONE HYDROCHLORIDE 1 MILLIGRAM(S): 2 INJECTION INTRAMUSCULAR; INTRAVENOUS; SUBCUTANEOUS at 17:48

## 2017-09-10 RX ADMIN — HYDROMORPHONE HYDROCHLORIDE 1 MILLIGRAM(S): 2 INJECTION INTRAMUSCULAR; INTRAVENOUS; SUBCUTANEOUS at 03:02

## 2017-09-10 RX ADMIN — HYDROMORPHONE HYDROCHLORIDE 1 MILLIGRAM(S): 2 INJECTION INTRAMUSCULAR; INTRAVENOUS; SUBCUTANEOUS at 23:02

## 2017-09-10 RX ADMIN — Medication 25 MILLIGRAM(S): at 19:33

## 2017-09-10 RX ADMIN — Medication 20 MILLIEQUIVALENT(S): at 00:01

## 2017-09-10 NOTE — PROGRESS NOTE ADULT - ASSESSMENT
57 F s/p  SBR, fistula resection, esophagojejunostomy revision w/ post-op course complicated by RTOR for distal anastomosis breakdown, ATN, acute respiratory failure, & septic shock.   FLD/TPN/IVF  Pain/nausea control  ISS  Zosyn  L IJ  Nystatin powder  SCDs, SQH, OOBA  Drains: Malecot in enterotomy site, wound vac to midline  Wounds: Midline xiphoid to pubis incision; DTI & stage 2 pressure ulcer noted.

## 2017-09-10 NOTE — PROGRESS NOTE ADULT - SUBJECTIVE AND OBJECTIVE BOX
SUBJECTIVE: Patient seen and examined bedside by chief resident. Resting comfortably. Ostomy bag was leaking in several locations and was reinforced. Ms. Alatorre denied any acute distress.    heparin  Injectable 5000 Unit(s) SubCutaneous every 8 hours  piperacillin/tazobactam IVPB. 4.5 Gram(s) IV Intermittent every 6 hours  losartan 50 milliGRAM(s) Oral daily  heparin  flush 100 Units/mL Injectable 100 Unit(s) IV Push every other day      Vital Signs Last 24 Hrs  T(C): 37.1 (10 Sep 2017 08:38), Max: 37.1 (09 Sep 2017 17:20)  T(F): 98.7 (10 Sep 2017 08:38), Max: 98.8 (09 Sep 2017 17:20)  HR: 67 (10 Sep 2017 08:38) (61 - 76)  BP: 147/87 (10 Sep 2017 08:38) (133/83 - 159/89)  BP(mean): --  RR: 17 (10 Sep 2017 08:38) (17 - 18)  SpO2: 97% (10 Sep 2017 08:38) (96% - 98%)  I&O's Detail    09 Sep 2017 07:01  -  10 Sep 2017 07:00  --------------------------------------------------------  IN:    Fat Emulsion 20%: 166.4 mL    Oral Fluid: 360 mL    Solution: 100 mL    TPN (Total Parenteral Nutrition): 1280 mL  Total IN: 1906.4 mL    OUT:    Drain: 350 mL    Drain: 200 mL    VAC (Vacuum Assisted Closure) System: 200 mL    Voided: 2350 mL  Total OUT: 3100 mL    Total NET: -1193.6 mL      10 Sep 2017 07:01  -  10 Sep 2017 14:54  --------------------------------------------------------  IN:    TPN (Total Parenteral Nutrition): 320 mL  Total IN: 320 mL    OUT:  Total OUT: 0 mL    Total NET: 320 mL          General: NAD, resting comfortably in bed  C/V: NSR  Pulm: Nonlabored breathing, no respiratory distress  Abd: soft, NT/ND. Vac and ostomy bag in place.  Extrem: WWP, no edema, SCDs in place      LABS:                        10.2   6.7   )-----------( 257      ( 10 Sep 2017 08:27 )             33.1     09-10    140  |  104  |  28<H>  ----------------------------<  154<H>  3.8   |  24  |  0.70    Ca    9.6      10 Sep 2017 08:27  Phos  2.8     09-09  Mg     2.0     09-10    TPro  7.2  /  Alb  3.2<L>  /  TBili  1.2  /  DBili  x   /  AST  14  /  ALT  13  /  AlkPhos  146<H>  09-10          RADIOLOGY & ADDITIONAL STUDIES:

## 2017-09-11 LAB
ALBUMIN SERPL ELPH-MCNC: 3.5 G/DL — SIGNIFICANT CHANGE UP (ref 3.3–5)
ALP SERPL-CCNC: 154 U/L — HIGH (ref 40–120)
ALT FLD-CCNC: 12 U/L — SIGNIFICANT CHANGE UP (ref 10–45)
ANION GAP SERPL CALC-SCNC: 12 MMOL/L — SIGNIFICANT CHANGE UP (ref 5–17)
AST SERPL-CCNC: 12 U/L — SIGNIFICANT CHANGE UP (ref 10–40)
BILIRUB SERPL-MCNC: 1.2 MG/DL — SIGNIFICANT CHANGE UP (ref 0.2–1.2)
BUN SERPL-MCNC: 31 MG/DL — HIGH (ref 7–23)
CALCIUM SERPL-MCNC: 10.4 MG/DL — SIGNIFICANT CHANGE UP (ref 8.4–10.5)
CHLORIDE SERPL-SCNC: 104 MMOL/L — SIGNIFICANT CHANGE UP (ref 96–108)
CO2 SERPL-SCNC: 25 MMOL/L — SIGNIFICANT CHANGE UP (ref 22–31)
CREAT SERPL-MCNC: 0.7 MG/DL — SIGNIFICANT CHANGE UP (ref 0.5–1.3)
GLUCOSE SERPL-MCNC: 111 MG/DL — HIGH (ref 70–99)
HCT VFR BLD CALC: 36.3 % — SIGNIFICANT CHANGE UP (ref 34.5–45)
HGB BLD-MCNC: 10.8 G/DL — LOW (ref 11.5–15.5)
MAGNESIUM SERPL-MCNC: 2.1 MG/DL — SIGNIFICANT CHANGE UP (ref 1.6–2.6)
MCHC RBC-ENTMCNC: 28.8 PG — SIGNIFICANT CHANGE UP (ref 27–34)
MCHC RBC-ENTMCNC: 29.8 G/DL — LOW (ref 32–36)
MCV RBC AUTO: 96.8 FL — SIGNIFICANT CHANGE UP (ref 80–100)
PHOSPHATE SERPL-MCNC: 2.6 MG/DL — SIGNIFICANT CHANGE UP (ref 2.5–4.5)
PLATELET # BLD AUTO: 287 K/UL — SIGNIFICANT CHANGE UP (ref 150–400)
POTASSIUM SERPL-MCNC: 4.2 MMOL/L — SIGNIFICANT CHANGE UP (ref 3.5–5.3)
POTASSIUM SERPL-SCNC: 4.2 MMOL/L — SIGNIFICANT CHANGE UP (ref 3.5–5.3)
PROT SERPL-MCNC: 7.7 G/DL — SIGNIFICANT CHANGE UP (ref 6–8.3)
RBC # BLD: 3.75 M/UL — LOW (ref 3.8–5.2)
RBC # FLD: 19.6 % — HIGH (ref 10.3–16.9)
SODIUM SERPL-SCNC: 141 MMOL/L — SIGNIFICANT CHANGE UP (ref 135–145)
WBC # BLD: 8.2 K/UL — SIGNIFICANT CHANGE UP (ref 3.8–10.5)
WBC # FLD AUTO: 8.2 K/UL — SIGNIFICANT CHANGE UP (ref 3.8–10.5)

## 2017-09-11 RX ORDER — DIPHENHYDRAMINE HCL 50 MG
25 CAPSULE ORAL ONCE
Qty: 0 | Refills: 0 | Status: COMPLETED | OUTPATIENT
Start: 2017-09-11 | End: 2017-09-11

## 2017-09-11 RX ORDER — ELECTROLYTE SOLUTION,INJ
1 VIAL (ML) INTRAVENOUS
Qty: 0 | Refills: 0 | Status: DISCONTINUED | OUTPATIENT
Start: 2017-09-11 | End: 2017-09-11

## 2017-09-11 RX ORDER — ENOXAPARIN SODIUM 100 MG/ML
30 INJECTION SUBCUTANEOUS
Qty: 0 | Refills: 0 | Status: DISCONTINUED | OUTPATIENT
Start: 2017-09-11 | End: 2017-09-26

## 2017-09-11 RX ADMIN — LOSARTAN POTASSIUM 50 MILLIGRAM(S): 100 TABLET, FILM COATED ORAL at 06:00

## 2017-09-11 RX ADMIN — HYDROMORPHONE HYDROCHLORIDE 1 MILLIGRAM(S): 2 INJECTION INTRAMUSCULAR; INTRAVENOUS; SUBCUTANEOUS at 23:59

## 2017-09-11 RX ADMIN — Medication 25 MILLIGRAM(S): at 23:20

## 2017-09-11 RX ADMIN — ESCITALOPRAM OXALATE 20 MILLIGRAM(S): 10 TABLET, FILM COATED ORAL at 21:34

## 2017-09-11 RX ADMIN — CALCITRIOL 1 MICROGRAM(S): 0.5 CAPSULE ORAL at 14:22

## 2017-09-11 RX ADMIN — HYDROMORPHONE HYDROCHLORIDE 0.5 MILLIGRAM(S): 2 INJECTION INTRAMUSCULAR; INTRAVENOUS; SUBCUTANEOUS at 12:41

## 2017-09-11 RX ADMIN — HYDROMORPHONE HYDROCHLORIDE 0.5 MILLIGRAM(S): 2 INJECTION INTRAMUSCULAR; INTRAVENOUS; SUBCUTANEOUS at 09:44

## 2017-09-11 RX ADMIN — Medication 25 MILLIGRAM(S): at 06:47

## 2017-09-11 RX ADMIN — ENOXAPARIN SODIUM 30 MILLIGRAM(S): 100 INJECTION SUBCUTANEOUS at 18:20

## 2017-09-11 RX ADMIN — PIPERACILLIN AND TAZOBACTAM 200 GRAM(S): 4; .5 INJECTION, POWDER, LYOPHILIZED, FOR SOLUTION INTRAVENOUS at 18:16

## 2017-09-11 RX ADMIN — NYSTATIN CREAM 1 APPLICATION(S): 100000 CREAM TOPICAL at 18:10

## 2017-09-11 RX ADMIN — HYDROMORPHONE HYDROCHLORIDE 1 MILLIGRAM(S): 2 INJECTION INTRAMUSCULAR; INTRAVENOUS; SUBCUTANEOUS at 06:30

## 2017-09-11 RX ADMIN — HYDROMORPHONE HYDROCHLORIDE 1 MILLIGRAM(S): 2 INJECTION INTRAMUSCULAR; INTRAVENOUS; SUBCUTANEOUS at 10:07

## 2017-09-11 RX ADMIN — HYDROMORPHONE HYDROCHLORIDE 0.5 MILLIGRAM(S): 2 INJECTION INTRAMUSCULAR; INTRAVENOUS; SUBCUTANEOUS at 08:37

## 2017-09-11 RX ADMIN — Medication 25 MILLIGRAM(S): at 14:19

## 2017-09-11 RX ADMIN — HYDROMORPHONE HYDROCHLORIDE 1 MILLIGRAM(S): 2 INJECTION INTRAMUSCULAR; INTRAVENOUS; SUBCUTANEOUS at 10:19

## 2017-09-11 RX ADMIN — HYDROMORPHONE HYDROCHLORIDE 1 MILLIGRAM(S): 2 INJECTION INTRAMUSCULAR; INTRAVENOUS; SUBCUTANEOUS at 19:27

## 2017-09-11 RX ADMIN — PANTOPRAZOLE SODIUM 40 MILLIGRAM(S): 20 TABLET, DELAYED RELEASE ORAL at 14:22

## 2017-09-11 RX ADMIN — HYDROMORPHONE HYDROCHLORIDE 1 MILLIGRAM(S): 2 INJECTION INTRAMUSCULAR; INTRAVENOUS; SUBCUTANEOUS at 05:59

## 2017-09-11 RX ADMIN — Medication 25 MILLIGRAM(S): at 12:41

## 2017-09-11 RX ADMIN — HYDROMORPHONE HYDROCHLORIDE 0.5 MILLIGRAM(S): 2 INJECTION INTRAMUSCULAR; INTRAVENOUS; SUBCUTANEOUS at 13:13

## 2017-09-11 RX ADMIN — PIPERACILLIN AND TAZOBACTAM 200 GRAM(S): 4; .5 INJECTION, POWDER, LYOPHILIZED, FOR SOLUTION INTRAVENOUS at 00:06

## 2017-09-11 RX ADMIN — NYSTATIN CREAM 1 APPLICATION(S): 100000 CREAM TOPICAL at 06:00

## 2017-09-11 RX ADMIN — Medication 25 MILLIGRAM(S): at 19:26

## 2017-09-11 RX ADMIN — HEPARIN SODIUM 5000 UNIT(S): 5000 INJECTION INTRAVENOUS; SUBCUTANEOUS at 05:59

## 2017-09-11 RX ADMIN — HYDROMORPHONE HYDROCHLORIDE 0.5 MILLIGRAM(S): 2 INJECTION INTRAMUSCULAR; INTRAVENOUS; SUBCUTANEOUS at 21:50

## 2017-09-11 RX ADMIN — Medication 1 APPLICATION(S): at 14:23

## 2017-09-11 RX ADMIN — HYDROMORPHONE HYDROCHLORIDE 1 MILLIGRAM(S): 2 INJECTION INTRAMUSCULAR; INTRAVENOUS; SUBCUTANEOUS at 14:21

## 2017-09-11 RX ADMIN — PIPERACILLIN AND TAZOBACTAM 200 GRAM(S): 4; .5 INJECTION, POWDER, LYOPHILIZED, FOR SOLUTION INTRAVENOUS at 05:59

## 2017-09-11 RX ADMIN — HYDROMORPHONE HYDROCHLORIDE 0.5 MILLIGRAM(S): 2 INJECTION INTRAMUSCULAR; INTRAVENOUS; SUBCUTANEOUS at 18:10

## 2017-09-11 RX ADMIN — Medication 10 MILLIGRAM(S): at 18:20

## 2017-09-11 RX ADMIN — PIPERACILLIN AND TAZOBACTAM 200 GRAM(S): 4; .5 INJECTION, POWDER, LYOPHILIZED, FOR SOLUTION INTRAVENOUS at 12:13

## 2017-09-11 RX ADMIN — HYDROMORPHONE HYDROCHLORIDE 0.5 MILLIGRAM(S): 2 INJECTION INTRAMUSCULAR; INTRAVENOUS; SUBCUTANEOUS at 17:18

## 2017-09-11 RX ADMIN — HYDROMORPHONE HYDROCHLORIDE 1 MILLIGRAM(S): 2 INJECTION INTRAMUSCULAR; INTRAVENOUS; SUBCUTANEOUS at 23:20

## 2017-09-11 RX ADMIN — HYDROMORPHONE HYDROCHLORIDE 0.5 MILLIGRAM(S): 2 INJECTION INTRAMUSCULAR; INTRAVENOUS; SUBCUTANEOUS at 21:35

## 2017-09-11 RX ADMIN — HYDROMORPHONE HYDROCHLORIDE 1 MILLIGRAM(S): 2 INJECTION INTRAMUSCULAR; INTRAVENOUS; SUBCUTANEOUS at 14:50

## 2017-09-11 RX ADMIN — Medication 2 MILLIGRAM(S): at 00:06

## 2017-09-11 RX ADMIN — Medication 25 MILLIGRAM(S): at 00:05

## 2017-09-11 NOTE — PROGRESS NOTE ADULT - SUBJECTIVE AND OBJECTIVE BOX
O/N: VSS, BRAYDEN  9/10: NAD, VSS. O/N: BRAYDEN ROBERTS  9/10: NAD, VSS.     Patient seen and examined bedside with chief resident. Patient has no complaints. Patient reports that there was no leakage from the fistula bag overnight. Patient denies chest pain, shortness of breath, abdominal pain, calf tenderness, nausea, vomiting, fever, or chills.    piperacillin/tazobactam IVPB. 4.5 Gram(s) IV Intermittent every 6 hours  losartan 50 milliGRAM(s) Oral daily      Vital Signs Last 24 Hrs  T(C): 36.4 (11 Sep 2017 05:55), Max: 37.3 (11 Sep 2017 00:11)  T(F): 97.5 (11 Sep 2017 05:55), Max: 99.1 (11 Sep 2017 00:11)  HR: 73 (11 Sep 2017 05:55) (62 - 79)  BP: 157/96 (11 Sep 2017 05:55) (111/79 - 157/96)  BP(mean): --  RR: 18 (11 Sep 2017 05:55) (16 - 18)  SpO2: 96% (11 Sep 2017 05:55) (96% - 97%)    I&O's Detail    10 Sep 2017 07:01  -  11 Sep 2017 07:00  --------------------------------------------------------  IN:    Solution: 100 mL    TPN (Total Parenteral Nutrition): 1344 mL  Total IN: 1444 mL    OUT:    Drain: 700 mL    Drain: 100 mL    Drain: 300 mL    VAC (Vacuum Assisted Closure) System: 300 mL    Voided: 1200 mL  Total OUT: 2600 mL    Total NET: -1156 mL        PHYSICAL EXAM:      Constitutional: NAD. resting comfortably in bed.    Gastrointestinal: soft. NT. ND. large wound vac in place. fistula bag in place.    Extremities: no edema, SCDs in place.

## 2017-09-11 NOTE — PROGRESS NOTE ADULT - ASSESSMENT
57 F s/p  SBR, fistula resection, esophagojejunostomy revision w/ post-op course complicated by RTOR for distal anastomosis breakdown, ATN, acute respiratory failure, & septic shock.   FLD/TPN/IVF  Pain/nausea control  ISS  Zosyn  L IJ  Nystatin powder  SCDs, SQH, OOBA  Drains: Malecot in enterotomy site, wound vac to midline  Wounds: Midline xiphoid to pubis incision; DTI & stage 2 pressure ulcer noted. 57 F s/p  SBR, fistula resection, esophagojejunostomy revision w/ post-op course complicated by RTOR for distal anastomosis breakdown, ATN, acute respiratory failure, & septic shock.     FLD/TPN/IVF  Pain/nausea control  ISS  Zosyn  L IJ/   Nystatin powder  SCDs, SQH, OOBA  Drains: Malecot in enterotomy site, wound vac to midline  Wounds: Midline xiphoid to pubis incision; DTI & stage 2 pressure ulcer noted.    f/u with wound care for vac change today

## 2017-09-11 NOTE — PROGRESS NOTE ADULT - SUBJECTIVE AND OBJECTIVE BOX
On interval followup, the left int jugular catheter site is clean, dry, non-inflamed and non-tender. T 99 range. Notes and cultures are followed.

## 2017-09-11 NOTE — CHART NOTE - NSCHARTNOTEFT_GEN_A_CORE
Admitting Diagnosis:   Patient is a 57y old  Female who presents with a chief complaint of enterocutaneous fistula (24 Jul 2017 14:15)   s/p SBR, fistula resection, esophagojejunostomy revision w/ post-op course complicated by RTOR for distal anastomosis breakdown, ATN, acute respiratory failure & septic shock.    PAST MEDICAL & SURGICAL HISTORY:  Reflux  Obesity  DVT (deep venous thrombosis): 2013 neck  Diverticulitis  Hypertension  Elective surgery: abodominal wall surgery  dec 2016  Elective surgery: NOV 2016 gastric bypass revision  Gastric bypass status for obesity: gastric sleeve, 6/2012  S/P breast biopsy: 2011, left  S/P colon resection: 2011  S/P knee surgery: repair 2009, 2011  right; left  Umbilical hernia: 2000  S/P appendectomy: 1975  S/P tonsillectomy: 1967      Current Nutrition Order:  Full Liquids  TPN via PICC receiving 104g AA, 254g Dex, no lipids today (1279kcal,  2g pro/kg IBW, GIR: 3.68)    PO Intake: Good (%) [   ]  Fair (50-75%) [   ] Poor (<25%) [ x  ]    GI Issues: Denies GI distress at present.    Pain: Pain being managed    Skin Integrity: DTI & Stage 2 pressure ulcer, surgical incision w/ wound vac.    Labs:   09-11    141  |  104  |  31<H>  ----------------------------<  111<H>  4.2   |  25  |  0.70    Ca    10.4      11 Sep 2017 07:15  Phos  2.6     09-11  Mg     2.1     09-11    TPro  7.7  /  Alb  3.5  /  TBili  1.2  /  DBili  x   /  AST  12  /  ALT  12  /  AlkPhos  154<H>  09-11    CAPILLARY BLOOD GLUCOSE  106 (11 Sep 2017 12:00)  111 (11 Sep 2017 06:00)  140 (10 Sep 2017 18:05)      Medications:  MEDICATIONS  (STANDING):  insulin lispro (HumaLOG) corrective regimen sliding scale   SubCutaneous every 6 hours  dextrose 5%. 1000 milliLiter(s) (50 mL/Hr) IV Continuous <Continuous>  dextrose 50% Injectable 25 Gram(s) IV Push once  sodium chloride 0.9% lock flush 20 milliLiter(s) IV Push once  cyanocobalamin Injectable 1000 MICROGram(s) SubCutaneous every 7 days  dextrose 50% Injectable 12.5 Gram(s) IV Push once  nystatin Powder 1 Application(s) Topical two times a day  pantoprazole  Injectable 40 milliGRAM(s) IV Push daily  calcitriol Injectable 1 MICROGram(s) IV Push <User Schedule>  bisacodyl Suppository 10 milliGRAM(s) Rectal daily  collagenase Ointment 1 Application(s) Topical daily  piperacillin/tazobactam IVPB. 4.5 Gram(s) IV Intermittent every 6 hours  escitalopram 20 milliGRAM(s) Oral daily  losartan 50 milliGRAM(s) Oral daily  heparin  flush 100 Units/mL Injectable 100 Unit(s) IV Push every other day  Parenteral Nutrition - Adult 1 Each (64 mL/Hr) TPN Continuous <Continuous>  Parenteral Nutrition - Adult 1 Each (64 mL/Hr) TPN Continuous <Continuous>  enoxaparin Injectable 30 milliGRAM(s) SubCutaneous two times a day    MEDICATIONS  (PRN):  ondansetron Injectable 4 milliGRAM(s) IV Push every 6 hours PRN Nausea  sodium chloride 0.9% lock flush 10 milliLiter(s) IV Push every 1 hour PRN After each medication administration  sodium chloride 0.9% lock flush 10 milliLiter(s) IV Push every 12 hours PRN Lumen of catheter NOT used  diphenhydrAMINE   Injectable 25 milliGRAM(s) IV Push every 6 hours PRN Rash and/or Itching  diazepam    Tablet 2 milliGRAM(s) Oral at bedtime PRN anxiety  HYDROmorphone  Injectable 0.5 milliGRAM(s) IV Push every 4 hours PRN Moderate Pain (4 - 6)  HYDROmorphone  Injectable 1 milliGRAM(s) IV Push every 4 hours PRN Severe Pain (7 - 10)  HYDROmorphone  Injectable 0.25 milliGRAM(s) IV Push once PRN breakthrough      Weight: (9/1) 99.5kg  Daily      Weight Change: no new wt noted--need updates w/ wt    Estimated energy needs: remain unchanged from the previous f/u:  Estimated energy needs using 52 kg IBW:  30-35 kcal/kg (3466-0211 kcal).   1.8-2 g/kg ( g protein).  30-35 mL/kg (9943-7012 mL fluid).     Subjective:   Pt remains on TPN via PICC receiving 104g AA, 254g Dex, no lipids. Now on full liquid diet PO and tolerating. Deneis GI distress at present. No known wt changes. Noted: DTI & Stage 2 pressure ulcer. TPN providing 100% estimated needs. Continue until pt can meet 60% estimated needs PO. Skin: surgical incision w/ wound vac in place.    Previous Nutrition Diagnosis: Increased nutrient needs RT fistula & s/p surgery AEB increased kcal/protein needs.     Active [  X]  Resolved [   ]    Goal: PT to meet >75% needs via tolerated route    Recommendations: continue on TPN with current order and advance diet to low fiber when medically feasible    Education: tpn and diet advancement    Risk Level: High [   ] Moderate [ X] Low [   ].

## 2017-09-11 NOTE — ADVANCED PRACTICE NURSE CONSULT - REASON FOR CONSULT
Spoke with Kathya RAZO who was requesting [facility] nurse consult to change VAC dressing. Informed Kathya the surgical team would be changing the dressing and the team and Dr Brady are aware.

## 2017-09-12 LAB
ANION GAP SERPL CALC-SCNC: 13 MMOL/L — SIGNIFICANT CHANGE UP (ref 5–17)
BUN SERPL-MCNC: 31 MG/DL — HIGH (ref 7–23)
CALCIUM SERPL-MCNC: 9.8 MG/DL — SIGNIFICANT CHANGE UP (ref 8.4–10.5)
CHLORIDE SERPL-SCNC: 103 MMOL/L — SIGNIFICANT CHANGE UP (ref 96–108)
CO2 SERPL-SCNC: 23 MMOL/L — SIGNIFICANT CHANGE UP (ref 22–31)
CREAT SERPL-MCNC: 0.7 MG/DL — SIGNIFICANT CHANGE UP (ref 0.5–1.3)
GLUCOSE SERPL-MCNC: 139 MG/DL — HIGH (ref 70–99)
HCT VFR BLD CALC: 32.9 % — LOW (ref 34.5–45)
HGB BLD-MCNC: 10.1 G/DL — LOW (ref 11.5–15.5)
MAGNESIUM SERPL-MCNC: 2.1 MG/DL — SIGNIFICANT CHANGE UP (ref 1.6–2.6)
MCHC RBC-ENTMCNC: 29.5 PG — SIGNIFICANT CHANGE UP (ref 27–34)
MCHC RBC-ENTMCNC: 30.7 G/DL — LOW (ref 32–36)
MCV RBC AUTO: 96.2 FL — SIGNIFICANT CHANGE UP (ref 80–100)
PHOSPHATE SERPL-MCNC: 2.8 MG/DL — SIGNIFICANT CHANGE UP (ref 2.5–4.5)
PLATELET # BLD AUTO: 281 K/UL — SIGNIFICANT CHANGE UP (ref 150–400)
POTASSIUM SERPL-MCNC: 4 MMOL/L — SIGNIFICANT CHANGE UP (ref 3.5–5.3)
POTASSIUM SERPL-SCNC: 4 MMOL/L — SIGNIFICANT CHANGE UP (ref 3.5–5.3)
RBC # BLD: 3.42 M/UL — LOW (ref 3.8–5.2)
RBC # FLD: 19.5 % — HIGH (ref 10.3–16.9)
SODIUM SERPL-SCNC: 139 MMOL/L — SIGNIFICANT CHANGE UP (ref 135–145)
TRIGL SERPL-MCNC: 125 MG/DL — SIGNIFICANT CHANGE UP (ref 10–149)
WBC # BLD: 7.9 K/UL — SIGNIFICANT CHANGE UP (ref 3.8–10.5)
WBC # FLD AUTO: 7.9 K/UL — SIGNIFICANT CHANGE UP (ref 3.8–10.5)

## 2017-09-12 PROCEDURE — 49423 EXCHANGE DRAINAGE CATHETER: CPT

## 2017-09-12 PROCEDURE — 75984 XRAY CONTROL CATHETER CHANGE: CPT | Mod: 26

## 2017-09-12 RX ORDER — HYDROMORPHONE HYDROCHLORIDE 2 MG/ML
1 INJECTION INTRAMUSCULAR; INTRAVENOUS; SUBCUTANEOUS ONCE
Qty: 0 | Refills: 0 | Status: DISCONTINUED | OUTPATIENT
Start: 2017-09-12 | End: 2017-09-14

## 2017-09-12 RX ORDER — ELECTROLYTE SOLUTION,INJ
1 VIAL (ML) INTRAVENOUS
Qty: 0 | Refills: 0 | Status: DISCONTINUED | OUTPATIENT
Start: 2017-09-12 | End: 2017-09-12

## 2017-09-12 RX ADMIN — LOSARTAN POTASSIUM 50 MILLIGRAM(S): 100 TABLET, FILM COATED ORAL at 06:12

## 2017-09-12 RX ADMIN — ESCITALOPRAM OXALATE 20 MILLIGRAM(S): 10 TABLET, FILM COATED ORAL at 21:46

## 2017-09-12 RX ADMIN — PIPERACILLIN AND TAZOBACTAM 200 GRAM(S): 4; .5 INJECTION, POWDER, LYOPHILIZED, FOR SOLUTION INTRAVENOUS at 17:56

## 2017-09-12 RX ADMIN — HYDROMORPHONE HYDROCHLORIDE 1 MILLIGRAM(S): 2 INJECTION INTRAMUSCULAR; INTRAVENOUS; SUBCUTANEOUS at 18:10

## 2017-09-12 RX ADMIN — PIPERACILLIN AND TAZOBACTAM 200 GRAM(S): 4; .5 INJECTION, POWDER, LYOPHILIZED, FOR SOLUTION INTRAVENOUS at 06:12

## 2017-09-12 RX ADMIN — PIPERACILLIN AND TAZOBACTAM 200 GRAM(S): 4; .5 INJECTION, POWDER, LYOPHILIZED, FOR SOLUTION INTRAVENOUS at 00:27

## 2017-09-12 RX ADMIN — HYDROMORPHONE HYDROCHLORIDE 1 MILLIGRAM(S): 2 INJECTION INTRAMUSCULAR; INTRAVENOUS; SUBCUTANEOUS at 22:15

## 2017-09-12 RX ADMIN — HYDROMORPHONE HYDROCHLORIDE 1 MILLIGRAM(S): 2 INJECTION INTRAMUSCULAR; INTRAVENOUS; SUBCUTANEOUS at 22:00

## 2017-09-12 RX ADMIN — HYDROMORPHONE HYDROCHLORIDE 1 MILLIGRAM(S): 2 INJECTION INTRAMUSCULAR; INTRAVENOUS; SUBCUTANEOUS at 08:36

## 2017-09-12 RX ADMIN — HYDROMORPHONE HYDROCHLORIDE 1 MILLIGRAM(S): 2 INJECTION INTRAMUSCULAR; INTRAVENOUS; SUBCUTANEOUS at 12:45

## 2017-09-12 RX ADMIN — Medication 100 UNIT(S): at 11:25

## 2017-09-12 RX ADMIN — Medication 25 MILLIGRAM(S): at 21:47

## 2017-09-12 RX ADMIN — Medication 10 MILLIGRAM(S): at 11:24

## 2017-09-12 RX ADMIN — Medication 25 MILLIGRAM(S): at 06:12

## 2017-09-12 RX ADMIN — HYDROMORPHONE HYDROCHLORIDE 0.5 MILLIGRAM(S): 2 INJECTION INTRAMUSCULAR; INTRAVENOUS; SUBCUTANEOUS at 10:38

## 2017-09-12 RX ADMIN — ENOXAPARIN SODIUM 30 MILLIGRAM(S): 100 INJECTION SUBCUTANEOUS at 06:14

## 2017-09-12 RX ADMIN — Medication 25 MILLIGRAM(S): at 12:29

## 2017-09-12 RX ADMIN — HYDROMORPHONE HYDROCHLORIDE 1 MILLIGRAM(S): 2 INJECTION INTRAMUSCULAR; INTRAVENOUS; SUBCUTANEOUS at 12:29

## 2017-09-12 RX ADMIN — HYDROMORPHONE HYDROCHLORIDE 0.5 MILLIGRAM(S): 2 INJECTION INTRAMUSCULAR; INTRAVENOUS; SUBCUTANEOUS at 06:12

## 2017-09-12 RX ADMIN — HYDROMORPHONE HYDROCHLORIDE 0.25 MILLIGRAM(S): 2 INJECTION INTRAMUSCULAR; INTRAVENOUS; SUBCUTANEOUS at 11:25

## 2017-09-12 RX ADMIN — Medication 1 APPLICATION(S): at 11:25

## 2017-09-12 RX ADMIN — Medication 2 MILLIGRAM(S): at 00:27

## 2017-09-12 RX ADMIN — HYDROMORPHONE HYDROCHLORIDE 1 MILLIGRAM(S): 2 INJECTION INTRAMUSCULAR; INTRAVENOUS; SUBCUTANEOUS at 04:16

## 2017-09-12 RX ADMIN — ENOXAPARIN SODIUM 30 MILLIGRAM(S): 100 INJECTION SUBCUTANEOUS at 17:56

## 2017-09-12 RX ADMIN — HYDROMORPHONE HYDROCHLORIDE 1 MILLIGRAM(S): 2 INJECTION INTRAMUSCULAR; INTRAVENOUS; SUBCUTANEOUS at 08:21

## 2017-09-12 RX ADMIN — HYDROMORPHONE HYDROCHLORIDE 1 MILLIGRAM(S): 2 INJECTION INTRAMUSCULAR; INTRAVENOUS; SUBCUTANEOUS at 17:56

## 2017-09-12 RX ADMIN — HYDROMORPHONE HYDROCHLORIDE 0.25 MILLIGRAM(S): 2 INJECTION INTRAMUSCULAR; INTRAVENOUS; SUBCUTANEOUS at 11:40

## 2017-09-12 RX ADMIN — NYSTATIN CREAM 1 APPLICATION(S): 100000 CREAM TOPICAL at 06:15

## 2017-09-12 RX ADMIN — HYDROMORPHONE HYDROCHLORIDE 1 MILLIGRAM(S): 2 INJECTION INTRAMUSCULAR; INTRAVENOUS; SUBCUTANEOUS at 04:32

## 2017-09-12 RX ADMIN — PANTOPRAZOLE SODIUM 40 MILLIGRAM(S): 20 TABLET, DELAYED RELEASE ORAL at 11:26

## 2017-09-12 RX ADMIN — Medication 1 EACH: at 17:56

## 2017-09-12 RX ADMIN — PIPERACILLIN AND TAZOBACTAM 200 GRAM(S): 4; .5 INJECTION, POWDER, LYOPHILIZED, FOR SOLUTION INTRAVENOUS at 11:25

## 2017-09-12 RX ADMIN — HYDROMORPHONE HYDROCHLORIDE 0.5 MILLIGRAM(S): 2 INJECTION INTRAMUSCULAR; INTRAVENOUS; SUBCUTANEOUS at 10:23

## 2017-09-12 RX ADMIN — HYDROMORPHONE HYDROCHLORIDE 0.5 MILLIGRAM(S): 2 INJECTION INTRAMUSCULAR; INTRAVENOUS; SUBCUTANEOUS at 06:45

## 2017-09-12 NOTE — PROGRESS NOTE ADULT - SUBJECTIVE AND OBJECTIVE BOX
very stable and have spent much time thinking about next steps and have reviewed with Dr Doc Faith and saw patient with dr Tracy  today hopefully dr faith will replace his percutaneous tube with new pigtail that he cannulates into bowel   the hope is to allow the fistula to become an enterocutaneous fistula with a tract  as now rosebud to atmosphere there is nothing to contract to hopefully close  otherwise in good spirits and very stable  will try to get her to reduce po following to encourage healing and reduce spillage

## 2017-09-12 NOTE — PROGRESS NOTE ADULT - SUBJECTIVE AND OBJECTIVE BOX
O/N: VSS, BRAYDEN  9/11: TPN ordered, patient will be NPO at midnight for IR placed fistula drain tomorrow OVERNIGHT EVENTS:  BRAYDEN ROBERTS  9/11: TPN ordered, patient will be NPO at midnight for IR placed fistula drain tomorrow          SUBJECTIVE: Patient examined bedside. Patient denies any compliants  Flatus: [X YES [] NO             Bowel Movement: [ X] YES [ ] NO  Pain (0-10):            Pain Control Adequate: [X ] YES [ ] NO  Nausea: [ ] YES [X ] NO            Vomiting: [ ] YES X[ ] NO  Diarrhea: [ ] YES [X ] NO         Constipation: [ ] YES [X] NO     Chest Pain: [ ] YES [ X] NO    SOB:  [ ] YES [X ] NO    piperacillin/tazobactam IVPB. 4.5 Gram(s) IV Intermittent every 6 hours  losartan 50 milliGRAM(s) Oral daily  heparin  flush 100 Units/mL Injectable 100 Unit(s) IV Push every other day  enoxaparin Injectable 30 milliGRAM(s) SubCutaneous two times a day      Vital Signs Last 24 Hrs  T(C): 37.4 (12 Sep 2017 05:38), Max: 37.4 (12 Sep 2017 05:38)  T(F): 99.3 (12 Sep 2017 05:38), Max: 99.3 (12 Sep 2017 05:38)  HR: 64 (12 Sep 2017 05:38) (61 - 69)  BP: 144/97 (12 Sep 2017 05:38) (130/85 - 146/84)  BP(mean): --  RR: 16 (12 Sep 2017 05:38) (16 - 18)  SpO2: 95% (12 Sep 2017 05:38) (95% - 99%)  I&O's Detail    11 Sep 2017 07:01  -  12 Sep 2017 07:00  --------------------------------------------------------  IN:    Oral Fluid: 480 mL    Solution: 400 mL    TPN (Total Parenteral Nutrition): 1280 mL  Total IN: 2160 mL    OUT:    Drain: 100 mL    Drain: 650 mL    VAC (Vacuum Assisted Closure) System: 375 mL    Voided: 2800 mL  Total OUT: 3925 mL    Total NET: -1765 mL      12 Sep 2017 07:01  -  12 Sep 2017 08:38  --------------------------------------------------------  IN:    TPN (Total Parenteral Nutrition): 64 mL  Total IN: 64 mL    OUT:  Total OUT: 0 mL    Total NET: 64 mL          General: NAD, resting comfortably in bed  C/V: NSR  Pulm: Nonlabored breathing, no respiratory distress  Abd: soft, non distended , TTP around wound vac site  Extrem: WWP, no edema, SCDs in place  Wound vac functioning       LABS:                        10.1   7.9   )-----------( 281      ( 12 Sep 2017 06:20 )             32.9     09-12    139  |  103  |  31<H>  ----------------------------<  139<H>  4.0   |  23  |  0.70    Ca    9.8      12 Sep 2017 06:20  Phos  2.8     09-12  Mg     2.1     09-12    TPro  7.7  /  Alb  3.5  /  TBili  1.2  /  DBili  x   /  AST  12  /  ALT  12  /  AlkPhos  154<H>  09-11

## 2017-09-12 NOTE — PROGRESS NOTE ADULT - ASSESSMENT
57 F s/p  SBR, fistula resection, esophagojejunostomy revision w/ post-op course complicated by RTOR for distal anastomosis breakdown, ATN, acute respiratory failure, & septic shock.   FLD/TPN/IVF  Pain/nausea control  ISS  Zosyn  L IJ  Nystatin powder  SCDs, SQH, OOBA  Drains: Malecot in enterotomy site, wound vac to midline  Wounds: Midline xiphoid to pubis incision; DTI & stage 2 pressure ulcer noted. 57 F s/p  SBR, fistula resection, esophagojejunostomy revision w/ post-op course complicated by RTOR for distal anastomosis breakdown, ATN, acute respiratory failure, & septic shock.   FLD/TPN/IVF  Pain/nausea control  ISS  Zosyn  L IJ  Nystatin powder  SCDs, SQH, OOBA  Drains: Malecot in enterotomy site, wound vac to midline  Wounds: Midline xiphoid to pubis incision; DTI & stage 2 pressure ulcer noted.    IR for drain placement

## 2017-09-13 LAB
ANION GAP SERPL CALC-SCNC: 11 MMOL/L — SIGNIFICANT CHANGE UP (ref 5–17)
BUN SERPL-MCNC: 32 MG/DL — HIGH (ref 7–23)
CALCIUM SERPL-MCNC: 10 MG/DL — SIGNIFICANT CHANGE UP (ref 8.4–10.5)
CHLORIDE SERPL-SCNC: 105 MMOL/L — SIGNIFICANT CHANGE UP (ref 96–108)
CO2 SERPL-SCNC: 25 MMOL/L — SIGNIFICANT CHANGE UP (ref 22–31)
CREAT SERPL-MCNC: 0.8 MG/DL — SIGNIFICANT CHANGE UP (ref 0.5–1.3)
GLUCOSE SERPL-MCNC: 127 MG/DL — HIGH (ref 70–99)
HCT VFR BLD CALC: 31.6 % — LOW (ref 34.5–45)
HGB BLD-MCNC: 9.6 G/DL — LOW (ref 11.5–15.5)
MAGNESIUM SERPL-MCNC: 2 MG/DL — SIGNIFICANT CHANGE UP (ref 1.6–2.6)
MCHC RBC-ENTMCNC: 29.5 PG — SIGNIFICANT CHANGE UP (ref 27–34)
MCHC RBC-ENTMCNC: 30.4 G/DL — LOW (ref 32–36)
MCV RBC AUTO: 97.2 FL — SIGNIFICANT CHANGE UP (ref 80–100)
PHOSPHATE SERPL-MCNC: 3.1 MG/DL — SIGNIFICANT CHANGE UP (ref 2.5–4.5)
PLATELET # BLD AUTO: 266 K/UL — SIGNIFICANT CHANGE UP (ref 150–400)
POTASSIUM SERPL-MCNC: 4.1 MMOL/L — SIGNIFICANT CHANGE UP (ref 3.5–5.3)
POTASSIUM SERPL-SCNC: 4.1 MMOL/L — SIGNIFICANT CHANGE UP (ref 3.5–5.3)
RBC # BLD: 3.25 M/UL — LOW (ref 3.8–5.2)
RBC # FLD: 19.9 % — HIGH (ref 10.3–16.9)
SODIUM SERPL-SCNC: 141 MMOL/L — SIGNIFICANT CHANGE UP (ref 135–145)
WBC # BLD: 5.6 K/UL — SIGNIFICANT CHANGE UP (ref 3.8–10.5)
WBC # FLD AUTO: 5.6 K/UL — SIGNIFICANT CHANGE UP (ref 3.8–10.5)

## 2017-09-13 RX ORDER — DIPHENHYDRAMINE HCL 50 MG
25 CAPSULE ORAL ONCE
Qty: 0 | Refills: 0 | Status: COMPLETED | OUTPATIENT
Start: 2017-09-13 | End: 2017-09-13

## 2017-09-13 RX ORDER — I.V. FAT EMULSION 20 G/100ML
50 EMULSION INTRAVENOUS ONCE
Qty: 0 | Refills: 0 | Status: COMPLETED | OUTPATIENT
Start: 2017-09-13 | End: 2017-09-13

## 2017-09-13 RX ORDER — ELECTROLYTE SOLUTION,INJ
1 VIAL (ML) INTRAVENOUS
Qty: 0 | Refills: 0 | Status: DISCONTINUED | OUTPATIENT
Start: 2017-09-13 | End: 2017-09-13

## 2017-09-13 RX ORDER — DIAZEPAM 5 MG
5 TABLET ORAL AT BEDTIME
Qty: 0 | Refills: 0 | Status: DISCONTINUED | OUTPATIENT
Start: 2017-09-13 | End: 2017-09-20

## 2017-09-13 RX ADMIN — CALCITRIOL 1 MICROGRAM(S): 0.5 CAPSULE ORAL at 11:41

## 2017-09-13 RX ADMIN — Medication 25 MILLIGRAM(S): at 05:31

## 2017-09-13 RX ADMIN — PIPERACILLIN AND TAZOBACTAM 200 GRAM(S): 4; .5 INJECTION, POWDER, LYOPHILIZED, FOR SOLUTION INTRAVENOUS at 11:40

## 2017-09-13 RX ADMIN — LOSARTAN POTASSIUM 50 MILLIGRAM(S): 100 TABLET, FILM COATED ORAL at 05:31

## 2017-09-13 RX ADMIN — HYDROMORPHONE HYDROCHLORIDE 1 MILLIGRAM(S): 2 INJECTION INTRAMUSCULAR; INTRAVENOUS; SUBCUTANEOUS at 18:57

## 2017-09-13 RX ADMIN — PIPERACILLIN AND TAZOBACTAM 200 GRAM(S): 4; .5 INJECTION, POWDER, LYOPHILIZED, FOR SOLUTION INTRAVENOUS at 00:00

## 2017-09-13 RX ADMIN — HYDROMORPHONE HYDROCHLORIDE 1 MILLIGRAM(S): 2 INJECTION INTRAMUSCULAR; INTRAVENOUS; SUBCUTANEOUS at 06:00

## 2017-09-13 RX ADMIN — I.V. FAT EMULSION 20.83 GRAM(S): 20 EMULSION INTRAVENOUS at 21:54

## 2017-09-13 RX ADMIN — PIPERACILLIN AND TAZOBACTAM 200 GRAM(S): 4; .5 INJECTION, POWDER, LYOPHILIZED, FOR SOLUTION INTRAVENOUS at 17:55

## 2017-09-13 RX ADMIN — Medication 25 MILLIGRAM(S): at 11:40

## 2017-09-13 RX ADMIN — PIPERACILLIN AND TAZOBACTAM 200 GRAM(S): 4; .5 INJECTION, POWDER, LYOPHILIZED, FOR SOLUTION INTRAVENOUS at 06:00

## 2017-09-13 RX ADMIN — HYDROMORPHONE HYDROCHLORIDE 1 MILLIGRAM(S): 2 INJECTION INTRAMUSCULAR; INTRAVENOUS; SUBCUTANEOUS at 02:15

## 2017-09-13 RX ADMIN — HYDROMORPHONE HYDROCHLORIDE 1 MILLIGRAM(S): 2 INJECTION INTRAMUSCULAR; INTRAVENOUS; SUBCUTANEOUS at 14:56

## 2017-09-13 RX ADMIN — HYDROMORPHONE HYDROCHLORIDE 1 MILLIGRAM(S): 2 INJECTION INTRAMUSCULAR; INTRAVENOUS; SUBCUTANEOUS at 23:41

## 2017-09-13 RX ADMIN — Medication 25 MILLIGRAM(S): at 18:58

## 2017-09-13 RX ADMIN — Medication 1 EACH: at 17:56

## 2017-09-13 RX ADMIN — HYDROMORPHONE HYDROCHLORIDE 1 MILLIGRAM(S): 2 INJECTION INTRAMUSCULAR; INTRAVENOUS; SUBCUTANEOUS at 22:59

## 2017-09-13 RX ADMIN — HYDROMORPHONE HYDROCHLORIDE 1 MILLIGRAM(S): 2 INJECTION INTRAMUSCULAR; INTRAVENOUS; SUBCUTANEOUS at 01:59

## 2017-09-13 RX ADMIN — HYDROMORPHONE HYDROCHLORIDE 1 MILLIGRAM(S): 2 INJECTION INTRAMUSCULAR; INTRAVENOUS; SUBCUTANEOUS at 15:10

## 2017-09-13 RX ADMIN — NYSTATIN CREAM 1 APPLICATION(S): 100000 CREAM TOPICAL at 06:47

## 2017-09-13 RX ADMIN — HYDROMORPHONE HYDROCHLORIDE 1 MILLIGRAM(S): 2 INJECTION INTRAMUSCULAR; INTRAVENOUS; SUBCUTANEOUS at 11:02

## 2017-09-13 RX ADMIN — PANTOPRAZOLE SODIUM 40 MILLIGRAM(S): 20 TABLET, DELAYED RELEASE ORAL at 11:41

## 2017-09-13 RX ADMIN — Medication 1 APPLICATION(S): at 11:47

## 2017-09-13 RX ADMIN — Medication 25 MILLIGRAM(S): at 22:27

## 2017-09-13 RX ADMIN — HYDROMORPHONE HYDROCHLORIDE 1 MILLIGRAM(S): 2 INJECTION INTRAMUSCULAR; INTRAVENOUS; SUBCUTANEOUS at 10:52

## 2017-09-13 RX ADMIN — HYDROMORPHONE HYDROCHLORIDE 1 MILLIGRAM(S): 2 INJECTION INTRAMUSCULAR; INTRAVENOUS; SUBCUTANEOUS at 19:15

## 2017-09-13 RX ADMIN — ESCITALOPRAM OXALATE 20 MILLIGRAM(S): 10 TABLET, FILM COATED ORAL at 21:52

## 2017-09-13 RX ADMIN — Medication 5 MILLIGRAM(S): at 21:52

## 2017-09-13 RX ADMIN — HYDROMORPHONE HYDROCHLORIDE 1 MILLIGRAM(S): 2 INJECTION INTRAMUSCULAR; INTRAVENOUS; SUBCUTANEOUS at 06:15

## 2017-09-13 RX ADMIN — PIPERACILLIN AND TAZOBACTAM 200 GRAM(S): 4; .5 INJECTION, POWDER, LYOPHILIZED, FOR SOLUTION INTRAVENOUS at 23:48

## 2017-09-13 RX ADMIN — ENOXAPARIN SODIUM 30 MILLIGRAM(S): 100 INJECTION SUBCUTANEOUS at 06:00

## 2017-09-13 RX ADMIN — NYSTATIN CREAM 1 APPLICATION(S): 100000 CREAM TOPICAL at 18:02

## 2017-09-13 RX ADMIN — PREGABALIN 1000 MICROGRAM(S): 225 CAPSULE ORAL at 11:41

## 2017-09-13 RX ADMIN — Medication 10 MILLIGRAM(S): at 11:41

## 2017-09-13 RX ADMIN — ENOXAPARIN SODIUM 30 MILLIGRAM(S): 100 INJECTION SUBCUTANEOUS at 17:56

## 2017-09-14 ENCOUNTER — OTHER (OUTPATIENT)
Age: 57
End: 2017-09-14

## 2017-09-14 LAB
ANION GAP SERPL CALC-SCNC: 13 MMOL/L — SIGNIFICANT CHANGE UP (ref 5–17)
BASOPHILS NFR BLD AUTO: 0.4 % — SIGNIFICANT CHANGE UP (ref 0–2)
BUN SERPL-MCNC: 27 MG/DL — HIGH (ref 7–23)
CALCIUM SERPL-MCNC: 9.8 MG/DL — SIGNIFICANT CHANGE UP (ref 8.4–10.5)
CHLORIDE SERPL-SCNC: 102 MMOL/L — SIGNIFICANT CHANGE UP (ref 96–108)
CO2 SERPL-SCNC: 24 MMOL/L — SIGNIFICANT CHANGE UP (ref 22–31)
CREAT SERPL-MCNC: 0.6 MG/DL — SIGNIFICANT CHANGE UP (ref 0.5–1.3)
EOSINOPHIL NFR BLD AUTO: 5.4 % — SIGNIFICANT CHANGE UP (ref 0–6)
GLUCOSE SERPL-MCNC: 152 MG/DL — HIGH (ref 70–99)
HCT VFR BLD CALC: 32.4 % — LOW (ref 34.5–45)
HGB BLD-MCNC: 10 G/DL — LOW (ref 11.5–15.5)
LYMPHOCYTES # BLD AUTO: 25.1 % — SIGNIFICANT CHANGE UP (ref 13–44)
MAGNESIUM SERPL-MCNC: 1.8 MG/DL — SIGNIFICANT CHANGE UP (ref 1.6–2.6)
MCHC RBC-ENTMCNC: 29.5 PG — SIGNIFICANT CHANGE UP (ref 27–34)
MCHC RBC-ENTMCNC: 30.9 G/DL — LOW (ref 32–36)
MCV RBC AUTO: 95.6 FL — SIGNIFICANT CHANGE UP (ref 80–100)
MONOCYTES NFR BLD AUTO: 8 % — SIGNIFICANT CHANGE UP (ref 2–14)
NEUTROPHILS NFR BLD AUTO: 61.1 % — SIGNIFICANT CHANGE UP (ref 43–77)
PHOSPHATE SERPL-MCNC: 2.5 MG/DL — SIGNIFICANT CHANGE UP (ref 2.5–4.5)
PLATELET # BLD AUTO: 263 K/UL — SIGNIFICANT CHANGE UP (ref 150–400)
POTASSIUM SERPL-MCNC: 3.6 MMOL/L — SIGNIFICANT CHANGE UP (ref 3.5–5.3)
POTASSIUM SERPL-SCNC: 3.6 MMOL/L — SIGNIFICANT CHANGE UP (ref 3.5–5.3)
RBC # BLD: 3.39 M/UL — LOW (ref 3.8–5.2)
RBC # FLD: 19.2 % — HIGH (ref 10.3–16.9)
SODIUM SERPL-SCNC: 139 MMOL/L — SIGNIFICANT CHANGE UP (ref 135–145)
WBC # BLD: 7 K/UL — SIGNIFICANT CHANGE UP (ref 3.8–10.5)
WBC # FLD AUTO: 7 K/UL — SIGNIFICANT CHANGE UP (ref 3.8–10.5)

## 2017-09-14 RX ORDER — ELECTROLYTE SOLUTION,INJ
1 VIAL (ML) INTRAVENOUS
Qty: 0 | Refills: 0 | Status: DISCONTINUED | OUTPATIENT
Start: 2017-09-14 | End: 2017-09-14

## 2017-09-14 RX ORDER — MAGNESIUM SULFATE 500 MG/ML
1 VIAL (ML) INJECTION ONCE
Qty: 0 | Refills: 0 | Status: COMPLETED | OUTPATIENT
Start: 2017-09-14 | End: 2017-09-14

## 2017-09-14 RX ORDER — SODIUM CHLORIDE 9 MG/ML
1000 INJECTION, SOLUTION INTRAVENOUS
Qty: 0 | Refills: 0 | Status: DISCONTINUED | OUTPATIENT
Start: 2017-09-14 | End: 2017-09-14

## 2017-09-14 RX ORDER — POTASSIUM CHLORIDE 20 MEQ
10 PACKET (EA) ORAL
Qty: 0 | Refills: 0 | Status: COMPLETED | OUTPATIENT
Start: 2017-09-14 | End: 2017-09-14

## 2017-09-14 RX ADMIN — Medication 1 APPLICATION(S): at 13:03

## 2017-09-14 RX ADMIN — Medication 100 MILLIEQUIVALENT(S): at 12:59

## 2017-09-14 RX ADMIN — HYDROMORPHONE HYDROCHLORIDE 1 MILLIGRAM(S): 2 INJECTION INTRAMUSCULAR; INTRAVENOUS; SUBCUTANEOUS at 22:11

## 2017-09-14 RX ADMIN — Medication 100 MILLIEQUIVALENT(S): at 13:59

## 2017-09-14 RX ADMIN — NYSTATIN CREAM 1 APPLICATION(S): 100000 CREAM TOPICAL at 17:33

## 2017-09-14 RX ADMIN — Medication 5 MILLIGRAM(S): at 21:49

## 2017-09-14 RX ADMIN — PIPERACILLIN AND TAZOBACTAM 200 GRAM(S): 4; .5 INJECTION, POWDER, LYOPHILIZED, FOR SOLUTION INTRAVENOUS at 17:32

## 2017-09-14 RX ADMIN — ENOXAPARIN SODIUM 30 MILLIGRAM(S): 100 INJECTION SUBCUTANEOUS at 17:32

## 2017-09-14 RX ADMIN — Medication 25 MILLIGRAM(S): at 17:33

## 2017-09-14 RX ADMIN — HYDROMORPHONE HYDROCHLORIDE 1 MILLIGRAM(S): 2 INJECTION INTRAMUSCULAR; INTRAVENOUS; SUBCUTANEOUS at 21:49

## 2017-09-14 RX ADMIN — Medication 10 MILLIGRAM(S): at 12:57

## 2017-09-14 RX ADMIN — HYDROMORPHONE HYDROCHLORIDE 1 MILLIGRAM(S): 2 INJECTION INTRAMUSCULAR; INTRAVENOUS; SUBCUTANEOUS at 03:38

## 2017-09-14 RX ADMIN — Medication 25 MILLIGRAM(S): at 23:30

## 2017-09-14 RX ADMIN — PIPERACILLIN AND TAZOBACTAM 200 GRAM(S): 4; .5 INJECTION, POWDER, LYOPHILIZED, FOR SOLUTION INTRAVENOUS at 12:57

## 2017-09-14 RX ADMIN — HYDROMORPHONE HYDROCHLORIDE 1 MILLIGRAM(S): 2 INJECTION INTRAMUSCULAR; INTRAVENOUS; SUBCUTANEOUS at 13:15

## 2017-09-14 RX ADMIN — HYDROMORPHONE HYDROCHLORIDE 1 MILLIGRAM(S): 2 INJECTION INTRAMUSCULAR; INTRAVENOUS; SUBCUTANEOUS at 17:48

## 2017-09-14 RX ADMIN — PANTOPRAZOLE SODIUM 40 MILLIGRAM(S): 20 TABLET, DELAYED RELEASE ORAL at 12:56

## 2017-09-14 RX ADMIN — Medication 100 GRAM(S): at 17:32

## 2017-09-14 RX ADMIN — NYSTATIN CREAM 1 APPLICATION(S): 100000 CREAM TOPICAL at 05:55

## 2017-09-14 RX ADMIN — Medication 1 EACH: at 19:54

## 2017-09-14 RX ADMIN — HYDROMORPHONE HYDROCHLORIDE 1 MILLIGRAM(S): 2 INJECTION INTRAMUSCULAR; INTRAVENOUS; SUBCUTANEOUS at 04:49

## 2017-09-14 RX ADMIN — HYDROMORPHONE HYDROCHLORIDE 1 MILLIGRAM(S): 2 INJECTION INTRAMUSCULAR; INTRAVENOUS; SUBCUTANEOUS at 17:33

## 2017-09-14 RX ADMIN — PIPERACILLIN AND TAZOBACTAM 200 GRAM(S): 4; .5 INJECTION, POWDER, LYOPHILIZED, FOR SOLUTION INTRAVENOUS at 05:54

## 2017-09-14 RX ADMIN — Medication 100 UNIT(S): at 12:57

## 2017-09-14 RX ADMIN — ESCITALOPRAM OXALATE 20 MILLIGRAM(S): 10 TABLET, FILM COATED ORAL at 21:49

## 2017-09-14 RX ADMIN — Medication 25 MILLIGRAM(S): at 03:37

## 2017-09-14 RX ADMIN — Medication: at 19:05

## 2017-09-14 RX ADMIN — ENOXAPARIN SODIUM 30 MILLIGRAM(S): 100 INJECTION SUBCUTANEOUS at 05:54

## 2017-09-14 RX ADMIN — Medication 25 MILLIGRAM(S): at 10:39

## 2017-09-14 RX ADMIN — LOSARTAN POTASSIUM 50 MILLIGRAM(S): 100 TABLET, FILM COATED ORAL at 05:54

## 2017-09-14 RX ADMIN — HYDROMORPHONE HYDROCHLORIDE 1 MILLIGRAM(S): 2 INJECTION INTRAMUSCULAR; INTRAVENOUS; SUBCUTANEOUS at 13:30

## 2017-09-14 RX ADMIN — HYDROMORPHONE HYDROCHLORIDE 1 MILLIGRAM(S): 2 INJECTION INTRAMUSCULAR; INTRAVENOUS; SUBCUTANEOUS at 07:38

## 2017-09-14 RX ADMIN — HYDROMORPHONE HYDROCHLORIDE 1 MILLIGRAM(S): 2 INJECTION INTRAMUSCULAR; INTRAVENOUS; SUBCUTANEOUS at 08:10

## 2017-09-14 RX ADMIN — Medication 100 MILLIEQUIVALENT(S): at 15:14

## 2017-09-14 NOTE — PROGRESS NOTE ADULT - SUBJECTIVE AND OBJECTIVE BOX
On interval followup, the left int jugular catheter site is clean, dry, non-inflamed and non-tender.  T 99 range. Notes, cultures and progress are followed.

## 2017-09-14 NOTE — PROGRESS NOTE ADULT - SUBJECTIVE AND OBJECTIVE BOX
O/N: BRAYDEN, VSS  9/13: VSS, wound vac functioning,  strict NPO, Wound vac change per Dr. Brady change Friday or Monday increase pain medication before wound vac change     7/24: SBR resection, fistula resection, revision of esophagogegunostomy drain through esophageal hiatus in R mediastinum  7/29: IR drainage of 800 cc of succus  7/29: Ex-lap, SB anastamosis in BP limb breakdown with succus throughout abdomen  8/1: abd washout, drainage of abscess, biologic mesh placement, closure of abdominal wall, vac and retention suture  8/7: abd washout, mesh removal, frozen abd noted, LLQ CHECO replaced with benson drain  8/10: leak noted from previous anastamosis site on L side, mid abdomen malecot drain placed in enterotomy, JPs x 2 placed, necrotic fascia debrided, vicryl mesh sutured to fascia circumferentially, xeroform over mesh then vac dressing placed O/N: BRAYDEN, VSS  9/13: VSS, wound vac functioning,  strict NPO, Wound vac change per Dr. Brady change Friday or Monday increase pain medication before wound vac change     7/24: SBR resection, fistula resection, revision of esophagogegunostomy drain through esophageal hiatus in R mediastinum  7/29: IR drainage of 800 cc of succus  7/29: Ex-lap, SB anastamosis in BP limb breakdown with succus throughout abdomen  8/1: abd washout, drainage of abscess, biologic mesh placement, closure of abdominal wall, vac and retention suture  8/7: abd washout, mesh removal, frozen abd noted, LLQ CHECO replaced with benson drain  8/10: leak noted from previous anastamosis site on L side, mid abdomen malecot drain placed in enterotomy, JPs x 2 placed, necrotic fascia debrided, vicryl mesh sutured to fascia circumferentially, xeroform over mesh then vac dressing placed	    9/12 Fistula drain placed by IR Dr. Duggan      SUBJECTIVE: Patient examined bedside. Patient complains of leakage around wound vac.  Flatus: [X] YES [] NO             Bowel Movement: [X ] YES [ ] NO  Pain (0-10):            Pain Control Adequate: [X ] YES [ ] NO  Nausea: [ ] YES [X ] NO            Vomiting: [ ] YES [X ] NO  Diarrhea: [ ] YES [X ] NO         Constipation: [ ] YES [X ] NO     Chest Pain: [ ] YES [X ] NO    SOB:  [ ] YES [X ] NO    piperacillin/tazobactam IVPB. 4.5 Gram(s) IV Intermittent every 6 hours  losartan 50 milliGRAM(s) Oral daily  heparin  flush 100 Units/mL Injectable 100 Unit(s) IV Push every other day  enoxaparin Injectable 30 milliGRAM(s) SubCutaneous two times a day      Vital Signs Last 24 Hrs  T(C): 36.7 (14 Sep 2017 09:00), Max: 37.3 (13 Sep 2017 19:55)  T(F): 98.1 (14 Sep 2017 09:00), Max: 99.1 (13 Sep 2017 19:55)  HR: 71 (14 Sep 2017 09:00) (61 - 80)  BP: 110/76 (14 Sep 2017 09:00) (110/76 - 167/83)  BP(mean): --  RR: 16 (14 Sep 2017 09:00) (16 - 18)  SpO2: 97% (14 Sep 2017 09:00) (95% - 97%)  I&O's Detail    13 Sep 2017 07:01  -  14 Sep 2017 07:00  --------------------------------------------------------  IN:    Oral Fluid: 120 mL    Solution: 100 mL    TPN (Total Parenteral Nutrition): 832 mL  Total IN: 1052 mL    OUT:    VAC (Vacuum Assisted Closure) System: 200 mL    Voided: 1700 mL  Total OUT: 1900 mL    Total NET: -848 mL      14 Sep 2017 07:01  -  14 Sep 2017 09:48  --------------------------------------------------------  IN:  Total IN: 0 mL    OUT:    VAC (Vacuum Assisted Closure) System: 330 mL    Voided: 200 mL  Total OUT: 530 mL    Total NET: -530 mL          General: NAD, resting comfortably in bed  C/V: NSR  Pulm: Nonlabored breathing, no respiratory distress  Abd: soft, NT/ND.  Extrem: WWP, no edema, SCDs in place  Drains:  Aimee:      LABS:                        10.0   7.0   )-----------( 263      ( 14 Sep 2017 06:59 )             32.4     09-14    139  |  102  |  27<H>  ----------------------------<  152<H>  3.6   |  24  |  0.60    Ca    9.8      14 Sep 2017 06:59  Phos  2.5     09-14  Mg     1.8     09-14

## 2017-09-14 NOTE — PROGRESS NOTE ADULT - ASSESSMENT
57 F s/p  SBR, fistula resection, esophagojejunostomy revision w/ post-op course complicated by RTOR for distal anastomosis breakdown, ATN, acute respiratory failure, & septic shock.   NPO/TPN/IVF  Pain/nausea control  ISS  Zosyn  L IJ  Nystatin powder  SCDs, SQH, OOBA  Drains: Malecot in enterotomy site, wound vac to midline  Wounds: Midline xiphoid to pubis incision; DTI & stage 2 pressure ulcer noted. 57 F s/p  SBR, fistula resection, esophagojejunostomy revision w/ post-op course complicated by RTOR for distal anastomosis breakdown, ATN, acute respiratory failure, & septic shock.   NPO/TPN/IVF  Pain/nausea control  ISS  Zosyn  L IJ  Nystatin powder  SCDs, SQH, OOBA  Drains: Malecot in enterotomy site, wound vac to midline  Wounds: Midline xiphoid to pubis incision; DTI & stage 2 pressure ulcer noted.    change wound vac

## 2017-09-15 LAB
ANION GAP SERPL CALC-SCNC: 12 MMOL/L — SIGNIFICANT CHANGE UP (ref 5–17)
BUN SERPL-MCNC: 27 MG/DL — HIGH (ref 7–23)
CALCIUM SERPL-MCNC: 10 MG/DL — SIGNIFICANT CHANGE UP (ref 8.4–10.5)
CHLORIDE SERPL-SCNC: 103 MMOL/L — SIGNIFICANT CHANGE UP (ref 96–108)
CO2 SERPL-SCNC: 24 MMOL/L — SIGNIFICANT CHANGE UP (ref 22–31)
CREAT SERPL-MCNC: 0.67 MG/DL — SIGNIFICANT CHANGE UP (ref 0.5–1.3)
GLUCOSE SERPL-MCNC: 147 MG/DL — HIGH (ref 70–99)
HCT VFR BLD CALC: 32.2 % — LOW (ref 34.5–45)
HGB BLD-MCNC: 10.1 G/DL — LOW (ref 11.5–15.5)
MAGNESIUM SERPL-MCNC: 2.3 MG/DL — SIGNIFICANT CHANGE UP (ref 1.6–2.6)
MCHC RBC-ENTMCNC: 29.8 PG — SIGNIFICANT CHANGE UP (ref 27–34)
MCHC RBC-ENTMCNC: 31.4 G/DL — LOW (ref 32–36)
MCV RBC AUTO: 95 FL — SIGNIFICANT CHANGE UP (ref 80–100)
PHOSPHATE SERPL-MCNC: 2.9 MG/DL — SIGNIFICANT CHANGE UP (ref 2.5–4.5)
PLATELET # BLD AUTO: 250 K/UL — SIGNIFICANT CHANGE UP (ref 150–400)
POTASSIUM SERPL-MCNC: 4 MMOL/L — SIGNIFICANT CHANGE UP (ref 3.5–5.3)
POTASSIUM SERPL-SCNC: 4 MMOL/L — SIGNIFICANT CHANGE UP (ref 3.5–5.3)
RBC # BLD: 3.39 M/UL — LOW (ref 3.8–5.2)
RBC # FLD: 19.2 % — HIGH (ref 10.3–16.9)
SODIUM SERPL-SCNC: 139 MMOL/L — SIGNIFICANT CHANGE UP (ref 135–145)
WBC # BLD: 6.2 K/UL — SIGNIFICANT CHANGE UP (ref 3.8–10.5)
WBC # FLD AUTO: 6.2 K/UL — SIGNIFICANT CHANGE UP (ref 3.8–10.5)

## 2017-09-15 RX ORDER — HYDROMORPHONE HYDROCHLORIDE 2 MG/ML
1 INJECTION INTRAMUSCULAR; INTRAVENOUS; SUBCUTANEOUS ONCE
Qty: 0 | Refills: 0 | Status: DISCONTINUED | OUTPATIENT
Start: 2017-09-15 | End: 2017-09-15

## 2017-09-15 RX ORDER — I.V. FAT EMULSION 20 G/100ML
50 EMULSION INTRAVENOUS ONCE
Qty: 0 | Refills: 0 | Status: COMPLETED | OUTPATIENT
Start: 2017-09-15 | End: 2017-09-15

## 2017-09-15 RX ORDER — ELECTROLYTE SOLUTION,INJ
1 VIAL (ML) INTRAVENOUS
Qty: 0 | Refills: 0 | Status: DISCONTINUED | OUTPATIENT
Start: 2017-09-15 | End: 2017-09-15

## 2017-09-15 RX ADMIN — Medication 25 MILLIGRAM(S): at 23:18

## 2017-09-15 RX ADMIN — ENOXAPARIN SODIUM 30 MILLIGRAM(S): 100 INJECTION SUBCUTANEOUS at 18:15

## 2017-09-15 RX ADMIN — PIPERACILLIN AND TAZOBACTAM 200 GRAM(S): 4; .5 INJECTION, POWDER, LYOPHILIZED, FOR SOLUTION INTRAVENOUS at 23:17

## 2017-09-15 RX ADMIN — HYDROMORPHONE HYDROCHLORIDE 1 MILLIGRAM(S): 2 INJECTION INTRAMUSCULAR; INTRAVENOUS; SUBCUTANEOUS at 12:15

## 2017-09-15 RX ADMIN — NYSTATIN CREAM 1 APPLICATION(S): 100000 CREAM TOPICAL at 05:55

## 2017-09-15 RX ADMIN — LOSARTAN POTASSIUM 50 MILLIGRAM(S): 100 TABLET, FILM COATED ORAL at 05:55

## 2017-09-15 RX ADMIN — Medication 5 MILLIGRAM(S): at 21:20

## 2017-09-15 RX ADMIN — HYDROMORPHONE HYDROCHLORIDE 1 MILLIGRAM(S): 2 INJECTION INTRAMUSCULAR; INTRAVENOUS; SUBCUTANEOUS at 12:06

## 2017-09-15 RX ADMIN — PANTOPRAZOLE SODIUM 40 MILLIGRAM(S): 20 TABLET, DELAYED RELEASE ORAL at 11:54

## 2017-09-15 RX ADMIN — ENOXAPARIN SODIUM 30 MILLIGRAM(S): 100 INJECTION SUBCUTANEOUS at 05:55

## 2017-09-15 RX ADMIN — I.V. FAT EMULSION 20.83 GRAM(S): 20 EMULSION INTRAVENOUS at 22:35

## 2017-09-15 RX ADMIN — PIPERACILLIN AND TAZOBACTAM 200 GRAM(S): 4; .5 INJECTION, POWDER, LYOPHILIZED, FOR SOLUTION INTRAVENOUS at 11:54

## 2017-09-15 RX ADMIN — CALCITRIOL 1 MICROGRAM(S): 0.5 CAPSULE ORAL at 11:54

## 2017-09-15 RX ADMIN — Medication 25 MILLIGRAM(S): at 12:09

## 2017-09-15 RX ADMIN — HYDROMORPHONE HYDROCHLORIDE 1 MILLIGRAM(S): 2 INJECTION INTRAMUSCULAR; INTRAVENOUS; SUBCUTANEOUS at 09:34

## 2017-09-15 RX ADMIN — PIPERACILLIN AND TAZOBACTAM 200 GRAM(S): 4; .5 INJECTION, POWDER, LYOPHILIZED, FOR SOLUTION INTRAVENOUS at 00:36

## 2017-09-15 RX ADMIN — HYDROMORPHONE HYDROCHLORIDE 1 MILLIGRAM(S): 2 INJECTION INTRAMUSCULAR; INTRAVENOUS; SUBCUTANEOUS at 21:20

## 2017-09-15 RX ADMIN — HYDROMORPHONE HYDROCHLORIDE 1 MILLIGRAM(S): 2 INJECTION INTRAMUSCULAR; INTRAVENOUS; SUBCUTANEOUS at 01:30

## 2017-09-15 RX ADMIN — NYSTATIN CREAM 1 APPLICATION(S): 100000 CREAM TOPICAL at 18:16

## 2017-09-15 RX ADMIN — HYDROMORPHONE HYDROCHLORIDE 1 MILLIGRAM(S): 2 INJECTION INTRAMUSCULAR; INTRAVENOUS; SUBCUTANEOUS at 05:55

## 2017-09-15 RX ADMIN — HYDROMORPHONE HYDROCHLORIDE 1 MILLIGRAM(S): 2 INJECTION INTRAMUSCULAR; INTRAVENOUS; SUBCUTANEOUS at 09:45

## 2017-09-15 RX ADMIN — HYDROMORPHONE HYDROCHLORIDE 1 MILLIGRAM(S): 2 INJECTION INTRAMUSCULAR; INTRAVENOUS; SUBCUTANEOUS at 06:15

## 2017-09-15 RX ADMIN — PIPERACILLIN AND TAZOBACTAM 200 GRAM(S): 4; .5 INJECTION, POWDER, LYOPHILIZED, FOR SOLUTION INTRAVENOUS at 05:55

## 2017-09-15 RX ADMIN — ESCITALOPRAM OXALATE 20 MILLIGRAM(S): 10 TABLET, FILM COATED ORAL at 21:20

## 2017-09-15 RX ADMIN — PIPERACILLIN AND TAZOBACTAM 200 GRAM(S): 4; .5 INJECTION, POWDER, LYOPHILIZED, FOR SOLUTION INTRAVENOUS at 18:15

## 2017-09-15 RX ADMIN — HYDROMORPHONE HYDROCHLORIDE 1 MILLIGRAM(S): 2 INJECTION INTRAMUSCULAR; INTRAVENOUS; SUBCUTANEOUS at 21:50

## 2017-09-15 RX ADMIN — Medication 25 MILLIGRAM(S): at 05:56

## 2017-09-15 RX ADMIN — HYDROMORPHONE HYDROCHLORIDE 1 MILLIGRAM(S): 2 INJECTION INTRAMUSCULAR; INTRAVENOUS; SUBCUTANEOUS at 01:04

## 2017-09-15 RX ADMIN — Medication 1 APPLICATION(S): at 12:06

## 2017-09-15 RX ADMIN — HYDROMORPHONE HYDROCHLORIDE 1 MILLIGRAM(S): 2 INJECTION INTRAMUSCULAR; INTRAVENOUS; SUBCUTANEOUS at 16:25

## 2017-09-15 RX ADMIN — Medication 1 EACH: at 18:16

## 2017-09-15 NOTE — PROGRESS NOTE ADULT - ASSESSMENT
57 F s/p  SBR, fistula resection, esophagojejunostomy revision w/ post-op course complicated by RTOR for distal anastomosis breakdown, ATN, acute respiratory failure, & septic shock.     NPO/TPN/IVF  Pain/nausea control  ISS  Zosyn  L IJ  Nystatin powder  SCDs, SQH, OOBA  Drains: Malecot in enterotomy site, wound vac to midline  Wounds: Midline xiphoid to pubis incision; DTI & stage 2 pressure ulcer noted.    change wound vac

## 2017-09-15 NOTE — PROGRESS NOTE ADULT - SUBJECTIVE AND OBJECTIVE BOX
placed pig tail into bowel  small amount of bilious drainage  changed dressing and there is thick spillage  will place binder  I am hoping to all area to granulate and convert to tract along drain  it is at lateral aspect of area  otherwise stable   requested that we really stop pain meds  and have restricted to dressing changes  when changed dressing no discomfort but requested  will discuss as obviously has been through alot and spirits excellent

## 2017-09-15 NOTE — PROGRESS NOTE ADULT - SUBJECTIVE AND OBJECTIVE BOX
O/N: BRAYDEN, benadryl, dilaudid, valium given at night.  9/14: Wound vac leaking changed bedside O/N: BRAYDEN, benadryl, dilaudid, valium given at night.  9/14: Wound vac leaking changed bedside  OVERNIGHT EVENTS:    STATUS POST:    7/24: SBR resection, fistula resection, revision of esophagogegunostomy drain through esophageal hiatus in R mediastinum  7/29: IR drainage of 800 cc of succus  7/29: Ex-lap, SB anastamosis in BP limb breakdown with succus throughout abdomen  8/1: abd washout, drainage of abscess, biologic mesh placement, closure of abdominal wall, vac and retention suture  8/7: abd washout, mesh removal, frozen abd noted, LLQ CHECO replaced with benson drain  8/10: leak noted from previous anastamosis site on L side, mid abdomen malecot drain placed in enterotomy, JPs x 2 placed, necrotic fascia debrided, vicryl mesh sutured to fascia circumferentially, xeroform over mesh then vac dressing placed	    9/12 Fistula drain placed by IR Dr. Duggan      SUBJECTIVE: Patient examined bedside. Patient complains of leaking wound vac overnight.  Flatus: [X] YES [] NO             Bowel Movement: [ X] YES [ ] NO  Pain (0-10):            Pain Control Adequate: [X ] YES [ ] NO  Nausea: [ ] YES [ X] NO            Vomiting: [ ] YES [X ] NO  Diarrhea: [ ] YES [X ] NO         Constipation: [ ] YES [X ] NO     Chest Pain: [ ] YES [ X] NO    SOB:  [ ] YES [X ] NO    piperacillin/tazobactam IVPB. 4.5 Gram(s) IV Intermittent every 6 hours  losartan 50 milliGRAM(s) Oral daily  heparin  flush 100 Units/mL Injectable 100 Unit(s) IV Push every other day  enoxaparin Injectable 30 milliGRAM(s) SubCutaneous two times a day      Vital Signs Last 24 Hrs  T(C): 36.6 (15 Sep 2017 08:30), Max: 37.4 (14 Sep 2017 14:06)  T(F): 97.9 (15 Sep 2017 08:30), Max: 99.4 (14 Sep 2017 14:06)  HR: 60 (15 Sep 2017 08:30) (59 - 64)  BP: 133/83 (15 Sep 2017 08:30) (112/74 - 149/88)  BP(mean): --  RR: 16 (15 Sep 2017 08:30) (16 - 18)  SpO2: 98% (15 Sep 2017 08:30) (94% - 98%)  I&O's Detail    14 Sep 2017 07:01  -  15 Sep 2017 07:00  --------------------------------------------------------  IN:    Oral Fluid: 1100 mL    Solution: 200 mL    TPN (Total Parenteral Nutrition): 1536 mL  Total IN: 2836 mL    OUT:    Bulb: 30 mL    VAC (Vacuum Assisted Closure) System: 680 mL    Voided: 1950 mL  Total OUT: 2660 mL    Total NET: 176 mL          General: NAD, resting comfortably in bed  C/V: NSR  Pulm: Nonlabored breathing, no respiratory distress  Abd: soft,  non distended, TTP around wound vac site.  Extrem: WWP, no edema, SCDs in place  WOUND vac leaking        LABS:                        10.1   6.2   )-----------( 250      ( 15 Sep 2017 06:17 )             32.2     09-15    139  |  103  |  27<H>  ----------------------------<  147<H>  4.0   |  24  |  0.67    Ca    10.0      15 Sep 2017 06:17  Phos  2.9     09-15  Mg     2.3     09-15

## 2017-09-16 LAB
CULTURE RESULTS: SIGNIFICANT CHANGE UP
SPECIMEN SOURCE: SIGNIFICANT CHANGE UP

## 2017-09-16 RX ORDER — DIPHENHYDRAMINE HCL 50 MG
25 CAPSULE ORAL ONCE
Qty: 0 | Refills: 0 | Status: COMPLETED | OUTPATIENT
Start: 2017-09-16 | End: 2017-09-16

## 2017-09-16 RX ORDER — ELECTROLYTE SOLUTION,INJ
1 VIAL (ML) INTRAVENOUS
Qty: 0 | Refills: 0 | Status: DISCONTINUED | OUTPATIENT
Start: 2017-09-16 | End: 2017-09-16

## 2017-09-16 RX ADMIN — ONDANSETRON 4 MILLIGRAM(S): 8 TABLET, FILM COATED ORAL at 06:59

## 2017-09-16 RX ADMIN — HYDROMORPHONE HYDROCHLORIDE 1 MILLIGRAM(S): 2 INJECTION INTRAMUSCULAR; INTRAVENOUS; SUBCUTANEOUS at 15:08

## 2017-09-16 RX ADMIN — PIPERACILLIN AND TAZOBACTAM 200 GRAM(S): 4; .5 INJECTION, POWDER, LYOPHILIZED, FOR SOLUTION INTRAVENOUS at 23:30

## 2017-09-16 RX ADMIN — Medication 1 EACH: at 18:11

## 2017-09-16 RX ADMIN — PIPERACILLIN AND TAZOBACTAM 200 GRAM(S): 4; .5 INJECTION, POWDER, LYOPHILIZED, FOR SOLUTION INTRAVENOUS at 07:00

## 2017-09-16 RX ADMIN — HYDROMORPHONE HYDROCHLORIDE 1 MILLIGRAM(S): 2 INJECTION INTRAMUSCULAR; INTRAVENOUS; SUBCUTANEOUS at 20:00

## 2017-09-16 RX ADMIN — NYSTATIN CREAM 1 APPLICATION(S): 100000 CREAM TOPICAL at 18:10

## 2017-09-16 RX ADMIN — Medication 5 MILLIGRAM(S): at 21:52

## 2017-09-16 RX ADMIN — Medication 1 APPLICATION(S): at 18:11

## 2017-09-16 RX ADMIN — PANTOPRAZOLE SODIUM 40 MILLIGRAM(S): 20 TABLET, DELAYED RELEASE ORAL at 12:00

## 2017-09-16 RX ADMIN — HYDROMORPHONE HYDROCHLORIDE 1 MILLIGRAM(S): 2 INJECTION INTRAMUSCULAR; INTRAVENOUS; SUBCUTANEOUS at 19:35

## 2017-09-16 RX ADMIN — PIPERACILLIN AND TAZOBACTAM 200 GRAM(S): 4; .5 INJECTION, POWDER, LYOPHILIZED, FOR SOLUTION INTRAVENOUS at 12:01

## 2017-09-16 RX ADMIN — PIPERACILLIN AND TAZOBACTAM 200 GRAM(S): 4; .5 INJECTION, POWDER, LYOPHILIZED, FOR SOLUTION INTRAVENOUS at 18:10

## 2017-09-16 RX ADMIN — HYDROMORPHONE HYDROCHLORIDE 1 MILLIGRAM(S): 2 INJECTION INTRAMUSCULAR; INTRAVENOUS; SUBCUTANEOUS at 23:30

## 2017-09-16 RX ADMIN — HYDROMORPHONE HYDROCHLORIDE 1 MILLIGRAM(S): 2 INJECTION INTRAMUSCULAR; INTRAVENOUS; SUBCUTANEOUS at 15:35

## 2017-09-16 RX ADMIN — Medication 25 MILLIGRAM(S): at 06:59

## 2017-09-16 RX ADMIN — ENOXAPARIN SODIUM 30 MILLIGRAM(S): 100 INJECTION SUBCUTANEOUS at 07:00

## 2017-09-16 RX ADMIN — ENOXAPARIN SODIUM 30 MILLIGRAM(S): 100 INJECTION SUBCUTANEOUS at 18:10

## 2017-09-16 RX ADMIN — ESCITALOPRAM OXALATE 20 MILLIGRAM(S): 10 TABLET, FILM COATED ORAL at 21:52

## 2017-09-16 RX ADMIN — HYDROMORPHONE HYDROCHLORIDE 1 MILLIGRAM(S): 2 INJECTION INTRAMUSCULAR; INTRAVENOUS; SUBCUTANEOUS at 07:30

## 2017-09-16 RX ADMIN — Medication 100 UNIT(S): at 12:01

## 2017-09-16 RX ADMIN — HYDROMORPHONE HYDROCHLORIDE 1 MILLIGRAM(S): 2 INJECTION INTRAMUSCULAR; INTRAVENOUS; SUBCUTANEOUS at 11:25

## 2017-09-16 RX ADMIN — HYDROMORPHONE HYDROCHLORIDE 1 MILLIGRAM(S): 2 INJECTION INTRAMUSCULAR; INTRAVENOUS; SUBCUTANEOUS at 02:07

## 2017-09-16 RX ADMIN — HYDROMORPHONE HYDROCHLORIDE 1 MILLIGRAM(S): 2 INJECTION INTRAMUSCULAR; INTRAVENOUS; SUBCUTANEOUS at 06:59

## 2017-09-16 RX ADMIN — NYSTATIN CREAM 1 APPLICATION(S): 100000 CREAM TOPICAL at 07:00

## 2017-09-16 RX ADMIN — HYDROMORPHONE HYDROCHLORIDE 1 MILLIGRAM(S): 2 INJECTION INTRAMUSCULAR; INTRAVENOUS; SUBCUTANEOUS at 11:04

## 2017-09-16 RX ADMIN — LOSARTAN POTASSIUM 50 MILLIGRAM(S): 100 TABLET, FILM COATED ORAL at 07:00

## 2017-09-16 RX ADMIN — Medication 25 MILLIGRAM(S): at 18:17

## 2017-09-16 RX ADMIN — HYDROMORPHONE HYDROCHLORIDE 1 MILLIGRAM(S): 2 INJECTION INTRAMUSCULAR; INTRAVENOUS; SUBCUTANEOUS at 02:30

## 2017-09-16 RX ADMIN — Medication 25 MILLIGRAM(S): at 12:00

## 2017-09-16 RX ADMIN — Medication 25 MILLIGRAM(S): at 21:52

## 2017-09-16 NOTE — PROGRESS NOTE ADULT - ASSESSMENT
57 F s/p  SBR, fistula resection, esophagojejunostomy revision w/ post-op course complicated by RTOR for distal anastomosis breakdown, ATN, acute respiratory failure, & septic shock.   NPO/TPN/IVF  Pain/nausea control  ISS  Zosyn  L IJ  Nystatin powder  SCDs, SQH, OOBA  Drains: Malecot in enterotomy site, wound vac to midline  Wounds: Midline xiphoid to pubis incision; DTI & stage 2 pressure ulcer noted. 57 F s/p  SBR, fistula resection, esophagojejunostomy revision w/ post-op course complicated by RTOR for distal anastomosis breakdown, ATN, acute respiratory failure, & septic shock.     1) Postoperative care  NPO/TPN/IVF  Pain/nausea control  ISS  Zosyn  L IJ  Nystatin powder  SCDs, SQH, OOBA  Drains: Malecot in enterotomy site, wound vac to midline    2) Pressure ulcer  Midline xiphoid to pubis incision; DTI & stage 2 pressure ulcer noted.      3) Leak at ostomy site  Reinforced    4) Dispo:  Pending

## 2017-09-16 NOTE — PROGRESS NOTE ADULT - SUBJECTIVE AND OBJECTIVE BOX
O/N: BRAYDEN, VSS  9/15: Wound vac changed , VSS O/N: BRAYDEN, VSS  9/15: Wound vac changed , VSS     STATUS POST:   7/24: SBR resection, fistula resection, revision of esophagogegunostomy drain through esophageal hiatus in R mediastinum  7/29: IR drainage of 800 cc of succus  7/29: Ex-lap, SB anastamosis in BP limb breakdown with succus throughout abdomen  8/1: abd washout, drainage of abscess, biologic mesh placement, closure of abdominal wall, vac and retention suture  8/7: abd washout, mesh removal, frozen abd noted, LLQ CHECO replaced with benson drain  8/10: leak noted from previous anastamosis site on L side, mid abdomen malecot drain placed in enterotomy, JPs x 2 placed, necrotic fascia debrided, vicryl mesh sutured to fascia circumferentially, xeroform over mesh then vac dressing placed	  9/12 Fistula drain placed by IR Dr. Duggan      SUBJECTIVE: Patient seen and examined bedside by chief resident. Resting comfortably. No acute events. Leaking from wound ostomy.     piperacillin/tazobactam IVPB. 4.5 Gram(s) IV Intermittent every 6 hours  losartan 50 milliGRAM(s) Oral daily  heparin  flush 100 Units/mL Injectable 100 Unit(s) IV Push every other day  enoxaparin Injectable 30 milliGRAM(s) SubCutaneous two times a day      Vital Signs Last 24 Hrs  T(C): 37.3 (16 Sep 2017 20:35), Max: 37.4 (16 Sep 2017 05:29)  T(F): 99.2 (16 Sep 2017 20:35), Max: 99.4 (16 Sep 2017 05:29)  HR: 77 (16 Sep 2017 20:35) (73 - 88)  BP: 120/82 (16 Sep 2017 20:35) (112/77 - 148/88)  BP(mean): --  RR: 16 (16 Sep 2017 20:35) (16 - 17)  SpO2: 96% (16 Sep 2017 20:35) (94% - 97%)  I&O's Detail    15 Sep 2017 07:01  -  16 Sep 2017 07:00  --------------------------------------------------------  IN:    Solution: 200 mL    TPN (Total Parenteral Nutrition): 768 mL  Total IN: 968 mL    OUT:    VAC (Vacuum Assisted Closure) System: 500 mL    Voided: 1620 mL  Total OUT: 2120 mL    Total NET: -1152 mL      16 Sep 2017 07:01  -  16 Sep 2017 21:38  --------------------------------------------------------  IN:    Solution: 200 mL    TPN (Total Parenteral Nutrition): 768 mL  Total IN: 968 mL    OUT:    VAC (Vacuum Assisted Closure) System: 100 mL    Voided: 800 mL  Total OUT: 900 mL    Total NET: 68 mL      General: NAD, resting comfortably in bed  C/V: NSR  Pulm: Nonlabored breathing, no respiratory distress  Abd: soft,  non distended, TTP around wound vac site.  Extrem: WWP, no edema, SCDs in place, WOUND vac leaking - Reinforced        LABS:                        10.1   6.2   )-----------( 250      ( 15 Sep 2017 06:17 )             32.2     09-15    139  |  103  |  27<H>  ----------------------------<  147<H>  4.0   |  24  |  0.67    Ca    10.0      15 Sep 2017 06:17  Phos  2.9     09-15  Mg     2.3     09-15            RADIOLOGY & ADDITIONAL STUDIES:

## 2017-09-17 LAB
ANION GAP SERPL CALC-SCNC: 12 MMOL/L — SIGNIFICANT CHANGE UP (ref 5–17)
BUN SERPL-MCNC: 28 MG/DL — HIGH (ref 7–23)
CALCIUM SERPL-MCNC: 9.4 MG/DL — SIGNIFICANT CHANGE UP (ref 8.4–10.5)
CHLORIDE SERPL-SCNC: 104 MMOL/L — SIGNIFICANT CHANGE UP (ref 96–108)
CO2 SERPL-SCNC: 23 MMOL/L — SIGNIFICANT CHANGE UP (ref 22–31)
CREAT SERPL-MCNC: 0.64 MG/DL — SIGNIFICANT CHANGE UP (ref 0.5–1.3)
GLUCOSE SERPL-MCNC: 160 MG/DL — HIGH (ref 70–99)
HCT VFR BLD CALC: 32.9 % — LOW (ref 34.5–45)
HGB BLD-MCNC: 10.1 G/DL — LOW (ref 11.5–15.5)
MAGNESIUM SERPL-MCNC: 2.1 MG/DL — SIGNIFICANT CHANGE UP (ref 1.6–2.6)
MCHC RBC-ENTMCNC: 29.4 PG — SIGNIFICANT CHANGE UP (ref 27–34)
MCHC RBC-ENTMCNC: 30.7 G/DL — LOW (ref 32–36)
MCV RBC AUTO: 95.9 FL — SIGNIFICANT CHANGE UP (ref 80–100)
PHOSPHATE SERPL-MCNC: 2.6 MG/DL — SIGNIFICANT CHANGE UP (ref 2.5–4.5)
PLATELET # BLD AUTO: 214 K/UL — SIGNIFICANT CHANGE UP (ref 150–400)
POTASSIUM SERPL-MCNC: 3.8 MMOL/L — SIGNIFICANT CHANGE UP (ref 3.5–5.3)
POTASSIUM SERPL-SCNC: 3.8 MMOL/L — SIGNIFICANT CHANGE UP (ref 3.5–5.3)
RBC # BLD: 3.43 M/UL — LOW (ref 3.8–5.2)
RBC # FLD: 19.5 % — HIGH (ref 10.3–16.9)
SODIUM SERPL-SCNC: 139 MMOL/L — SIGNIFICANT CHANGE UP (ref 135–145)
WBC # BLD: 6.2 K/UL — SIGNIFICANT CHANGE UP (ref 3.8–10.5)
WBC # FLD AUTO: 6.2 K/UL — SIGNIFICANT CHANGE UP (ref 3.8–10.5)

## 2017-09-17 RX ORDER — ELECTROLYTE SOLUTION,INJ
1 VIAL (ML) INTRAVENOUS
Qty: 0 | Refills: 0 | Status: DISCONTINUED | OUTPATIENT
Start: 2017-09-17 | End: 2017-09-17

## 2017-09-17 RX ORDER — DIPHENHYDRAMINE HCL 50 MG
25 CAPSULE ORAL ONCE
Qty: 0 | Refills: 0 | Status: COMPLETED | OUTPATIENT
Start: 2017-09-17 | End: 2017-09-17

## 2017-09-17 RX ORDER — HYDROMORPHONE HYDROCHLORIDE 2 MG/ML
0.5 INJECTION INTRAMUSCULAR; INTRAVENOUS; SUBCUTANEOUS EVERY 4 HOURS
Qty: 0 | Refills: 0 | Status: DISCONTINUED | OUTPATIENT
Start: 2017-09-17 | End: 2017-09-17

## 2017-09-17 RX ORDER — DIPHENHYDRAMINE HCL 50 MG
50 CAPSULE ORAL AT BEDTIME
Qty: 0 | Refills: 0 | Status: DISCONTINUED | OUTPATIENT
Start: 2017-09-17 | End: 2017-12-27

## 2017-09-17 RX ORDER — HYDROMORPHONE HYDROCHLORIDE 2 MG/ML
0.5 INJECTION INTRAMUSCULAR; INTRAVENOUS; SUBCUTANEOUS ONCE
Qty: 0 | Refills: 0 | Status: DISCONTINUED | OUTPATIENT
Start: 2017-09-17 | End: 2017-09-17

## 2017-09-17 RX ORDER — DIPHENHYDRAMINE HCL 50 MG
50 CAPSULE ORAL DAILY
Qty: 0 | Refills: 0 | Status: DISCONTINUED | OUTPATIENT
Start: 2017-09-17 | End: 2017-09-17

## 2017-09-17 RX ADMIN — Medication 25 MILLIGRAM(S): at 06:38

## 2017-09-17 RX ADMIN — HYDROMORPHONE HYDROCHLORIDE 1 MILLIGRAM(S): 2 INJECTION INTRAMUSCULAR; INTRAVENOUS; SUBCUTANEOUS at 03:42

## 2017-09-17 RX ADMIN — HYDROMORPHONE HYDROCHLORIDE 1 MILLIGRAM(S): 2 INJECTION INTRAMUSCULAR; INTRAVENOUS; SUBCUTANEOUS at 12:07

## 2017-09-17 RX ADMIN — HYDROMORPHONE HYDROCHLORIDE 1 MILLIGRAM(S): 2 INJECTION INTRAMUSCULAR; INTRAVENOUS; SUBCUTANEOUS at 00:33

## 2017-09-17 RX ADMIN — Medication 1 APPLICATION(S): at 12:46

## 2017-09-17 RX ADMIN — Medication 25 MILLIGRAM(S): at 00:33

## 2017-09-17 RX ADMIN — HYDROMORPHONE HYDROCHLORIDE 0.5 MILLIGRAM(S): 2 INJECTION INTRAMUSCULAR; INTRAVENOUS; SUBCUTANEOUS at 23:24

## 2017-09-17 RX ADMIN — NYSTATIN CREAM 1 APPLICATION(S): 100000 CREAM TOPICAL at 17:37

## 2017-09-17 RX ADMIN — HYDROMORPHONE HYDROCHLORIDE 1 MILLIGRAM(S): 2 INJECTION INTRAMUSCULAR; INTRAVENOUS; SUBCUTANEOUS at 11:52

## 2017-09-17 RX ADMIN — Medication 1 EACH: at 17:32

## 2017-09-17 RX ADMIN — HYDROMORPHONE HYDROCHLORIDE 1 MILLIGRAM(S): 2 INJECTION INTRAMUSCULAR; INTRAVENOUS; SUBCUTANEOUS at 07:42

## 2017-09-17 RX ADMIN — PIPERACILLIN AND TAZOBACTAM 200 GRAM(S): 4; .5 INJECTION, POWDER, LYOPHILIZED, FOR SOLUTION INTRAVENOUS at 17:32

## 2017-09-17 RX ADMIN — PANTOPRAZOLE SODIUM 40 MILLIGRAM(S): 20 TABLET, DELAYED RELEASE ORAL at 12:40

## 2017-09-17 RX ADMIN — NYSTATIN CREAM 1 APPLICATION(S): 100000 CREAM TOPICAL at 05:52

## 2017-09-17 RX ADMIN — Medication 25 MILLIGRAM(S): at 12:39

## 2017-09-17 RX ADMIN — PIPERACILLIN AND TAZOBACTAM 200 GRAM(S): 4; .5 INJECTION, POWDER, LYOPHILIZED, FOR SOLUTION INTRAVENOUS at 05:52

## 2017-09-17 RX ADMIN — ENOXAPARIN SODIUM 30 MILLIGRAM(S): 100 INJECTION SUBCUTANEOUS at 17:32

## 2017-09-17 RX ADMIN — HYDROMORPHONE HYDROCHLORIDE 0.5 MILLIGRAM(S): 2 INJECTION INTRAMUSCULAR; INTRAVENOUS; SUBCUTANEOUS at 21:30

## 2017-09-17 RX ADMIN — HYDROMORPHONE HYDROCHLORIDE 1 MILLIGRAM(S): 2 INJECTION INTRAMUSCULAR; INTRAVENOUS; SUBCUTANEOUS at 05:00

## 2017-09-17 RX ADMIN — HYDROMORPHONE HYDROCHLORIDE 0.5 MILLIGRAM(S): 2 INJECTION INTRAMUSCULAR; INTRAVENOUS; SUBCUTANEOUS at 20:45

## 2017-09-17 RX ADMIN — HYDROMORPHONE HYDROCHLORIDE 1 MILLIGRAM(S): 2 INJECTION INTRAMUSCULAR; INTRAVENOUS; SUBCUTANEOUS at 15:53

## 2017-09-17 RX ADMIN — Medication 25 MILLIGRAM(S): at 15:53

## 2017-09-17 RX ADMIN — LOSARTAN POTASSIUM 50 MILLIGRAM(S): 100 TABLET, FILM COATED ORAL at 05:52

## 2017-09-17 RX ADMIN — Medication 25 MILLIGRAM(S): at 18:40

## 2017-09-17 RX ADMIN — HYDROMORPHONE HYDROCHLORIDE 0.5 MILLIGRAM(S): 2 INJECTION INTRAMUSCULAR; INTRAVENOUS; SUBCUTANEOUS at 22:49

## 2017-09-17 RX ADMIN — ENOXAPARIN SODIUM 30 MILLIGRAM(S): 100 INJECTION SUBCUTANEOUS at 05:52

## 2017-09-17 RX ADMIN — PIPERACILLIN AND TAZOBACTAM 200 GRAM(S): 4; .5 INJECTION, POWDER, LYOPHILIZED, FOR SOLUTION INTRAVENOUS at 12:39

## 2017-09-17 RX ADMIN — ESCITALOPRAM OXALATE 20 MILLIGRAM(S): 10 TABLET, FILM COATED ORAL at 21:51

## 2017-09-17 RX ADMIN — PIPERACILLIN AND TAZOBACTAM 200 GRAM(S): 4; .5 INJECTION, POWDER, LYOPHILIZED, FOR SOLUTION INTRAVENOUS at 23:16

## 2017-09-17 RX ADMIN — HYDROMORPHONE HYDROCHLORIDE 1 MILLIGRAM(S): 2 INJECTION INTRAMUSCULAR; INTRAVENOUS; SUBCUTANEOUS at 08:05

## 2017-09-17 RX ADMIN — HYDROMORPHONE HYDROCHLORIDE 1 MILLIGRAM(S): 2 INJECTION INTRAMUSCULAR; INTRAVENOUS; SUBCUTANEOUS at 16:08

## 2017-09-17 RX ADMIN — Medication 50 MILLIGRAM(S): at 23:16

## 2017-09-17 NOTE — PROGRESS NOTE ADULT - ASSESSMENT
57 F s/p  SBR, fistula resection, esophagojejunostomy revision w/ post-op course complicated by RTOR for distal anastomosis breakdown, ATN, acute respiratory failure, & septic shock.   NPO/TPN/IVF  Pain/nausea control  ISS  Zosyn  L IJ  Nystatin powder  SCDs, SQH, OOBA  Drains: Malecot in enterotomy site, wound vac to midline  Wounds: Midline xiphoid to pubis incision; DTI & stage 2 pressure ulcer noted. 57 F s/p  SBR, fistula resection, esophagojejunostomy revision w/ post-op course complicated by RTOR for distal anastomosis breakdown, ATN, acute respiratory failure, & septic shock.     NPO except meds  TPN/IVF  Pain/nausea control  ISS  Zosyn  L IJ  Nystatin powder  SCDs, SQH, OOBA  Drains: Malecot in enterotomy site, wound vac to midline  Wounds: Midline xiphoid to pubis incision; DTI & stage 2 pressure ulcer noted.

## 2017-09-17 NOTE — PROGRESS NOTE ADULT - SUBJECTIVE AND OBJECTIVE BOX
On interval followup, the left int jugular catheter site is clean, dry, non-inflamed and non-tender. Afebrile. Notes, cultures and progress are followed.

## 2017-09-17 NOTE — PROGRESS NOTE ADULT - SUBJECTIVE AND OBJECTIVE BOX
9/16 OVN: VSS, BRAYDEN  9/16: reinforced ostomy leak    STATUS POST:    7/24: SBR resection, fistula resection, revision of esophagogegunostomy drain through esophageal hiatus in R mediastinum  7/29: IR drainage of 800 cc of succus  7/29: Ex-lap, SB anastamosis in BP limb breakdown with succus throughout abdomen  8/1: abd washout, drainage of abscess, biologic mesh placement, closure of abdominal wall, vac and retention suture  8/7: abd washout, mesh removal, frozen abd noted, LLQ CHECO replaced with benson drain  8/10: leak noted from previous anastamosis site on L side, mid abdomen malecot drain placed in enterotomy, JPs x 2 placed, necrotic fascia debrided, vicryl mesh sutured to fascia circumferentially, xeroform over mesh then vac dressing placed	    9/12 Fistula drain placed by IR Dr. Duggan 9/16 OVN: VSS, BRAYDEN  9/16: reinforced ostomy leak    STATUS POST:    7/24: SBR resection, fistula resection, revision of esophagogegunostomy drain through esophageal hiatus in R mediastinum  7/29: IR drainage of 800 cc of succus  7/29: Ex-lap, SB anastamosis in BP limb breakdown with succus throughout abdomen  8/1: abd washout, drainage of abscess, biologic mesh placement, closure of abdominal wall, vac and retention suture  8/7: abd washout, mesh removal, frozen abd noted, LLQ CHECO replaced with benson drain  8/10: leak noted from previous anastamosis site on L side, mid abdomen malecot drain placed in enterotomy, JPs x 2 placed, necrotic fascia debrided, vicryl mesh sutured to fascia circumferentially, xeroform over mesh then vac dressing placed	    9/12 Fistula drain placed by IR Dr. Duggan    Patient seen and examined bedside with chief resident. Patient has no complaints. Patient denies chest pain, shortness of breath, nausea, vomiting. fever. and chills.     piperacillin/tazobactam IVPB. 4.5 Gram(s) IV Intermittent every 6 hours  losartan 50 milliGRAM(s) Oral daily      Vital Signs Last 24 Hrs  T(C): 36.8 (17 Sep 2017 05:47), Max: 37.3 (16 Sep 2017 13:24)  T(F): 98.2 (17 Sep 2017 05:47), Max: 99.2 (16 Sep 2017 13:24)  HR: 75 (17 Sep 2017 05:47) (73 - 77)  BP: 126/83 (17 Sep 2017 05:47) (112/77 - 132/80)  BP(mean): --  RR: 17 (17 Sep 2017 05:47) (16 - 17)  SpO2: 94% (17 Sep 2017 05:47) (94% - 97%)    I&O's Detail    16 Sep 2017 07:01  -  17 Sep 2017 07:00  --------------------------------------------------------  IN:    Solution: 300 mL    TPN (Total Parenteral Nutrition): 1536 mL  Total IN: 1836 mL    OUT:    VAC (Vacuum Assisted Closure) System: 200 mL    Voided: 1800 mL  Total OUT: 2000 mL    Total NET: -164 mL        PHYSICAL EXAM:      Constitutional: NAD. resting comfortably in bed.    Gastrointestinal: soft. NT. ND. wound vac in place with ostomy bags for fistula drainage collection    Extremities: no edema. SCDs in place

## 2017-09-18 LAB
ANION GAP SERPL CALC-SCNC: 16 MMOL/L — SIGNIFICANT CHANGE UP (ref 5–17)
BUN SERPL-MCNC: 26 MG/DL — HIGH (ref 7–23)
CALCIUM SERPL-MCNC: 9.4 MG/DL — SIGNIFICANT CHANGE UP (ref 8.4–10.5)
CHLORIDE SERPL-SCNC: 100 MMOL/L — SIGNIFICANT CHANGE UP (ref 96–108)
CO2 SERPL-SCNC: 22 MMOL/L — SIGNIFICANT CHANGE UP (ref 22–31)
CREAT SERPL-MCNC: 0.6 MG/DL — SIGNIFICANT CHANGE UP (ref 0.5–1.3)
GLUCOSE SERPL-MCNC: 175 MG/DL — HIGH (ref 70–99)
HCT VFR BLD CALC: 33.7 % — LOW (ref 34.5–45)
HGB BLD-MCNC: 10.6 G/DL — LOW (ref 11.5–15.5)
MAGNESIUM SERPL-MCNC: 1.9 MG/DL — SIGNIFICANT CHANGE UP (ref 1.6–2.6)
MCHC RBC-ENTMCNC: 30 PG — SIGNIFICANT CHANGE UP (ref 27–34)
MCHC RBC-ENTMCNC: 31.5 G/DL — LOW (ref 32–36)
MCV RBC AUTO: 95.5 FL — SIGNIFICANT CHANGE UP (ref 80–100)
PHOSPHATE SERPL-MCNC: 2.3 MG/DL — LOW (ref 2.5–4.5)
PLATELET # BLD AUTO: 222 K/UL — SIGNIFICANT CHANGE UP (ref 150–400)
POTASSIUM SERPL-MCNC: 3.8 MMOL/L — SIGNIFICANT CHANGE UP (ref 3.5–5.3)
POTASSIUM SERPL-SCNC: 3.8 MMOL/L — SIGNIFICANT CHANGE UP (ref 3.5–5.3)
RBC # BLD: 3.53 M/UL — LOW (ref 3.8–5.2)
RBC # FLD: 19.3 % — HIGH (ref 10.3–16.9)
SODIUM SERPL-SCNC: 138 MMOL/L — SIGNIFICANT CHANGE UP (ref 135–145)
WBC # BLD: 6.6 K/UL — SIGNIFICANT CHANGE UP (ref 3.8–10.5)
WBC # FLD AUTO: 6.6 K/UL — SIGNIFICANT CHANGE UP (ref 3.8–10.5)

## 2017-09-18 RX ORDER — ELECTROLYTE SOLUTION,INJ
1 VIAL (ML) INTRAVENOUS
Qty: 0 | Refills: 0 | Status: DISCONTINUED | OUTPATIENT
Start: 2017-09-18 | End: 2017-09-18

## 2017-09-18 RX ORDER — HYDROMORPHONE HYDROCHLORIDE 2 MG/ML
1 INJECTION INTRAMUSCULAR; INTRAVENOUS; SUBCUTANEOUS ONCE
Qty: 0 | Refills: 0 | Status: DISCONTINUED | OUTPATIENT
Start: 2017-09-18 | End: 2017-09-18

## 2017-09-18 RX ADMIN — ESCITALOPRAM OXALATE 20 MILLIGRAM(S): 10 TABLET, FILM COATED ORAL at 21:13

## 2017-09-18 RX ADMIN — NYSTATIN CREAM 1 APPLICATION(S): 100000 CREAM TOPICAL at 06:28

## 2017-09-18 RX ADMIN — PIPERACILLIN AND TAZOBACTAM 200 GRAM(S): 4; .5 INJECTION, POWDER, LYOPHILIZED, FOR SOLUTION INTRAVENOUS at 06:26

## 2017-09-18 RX ADMIN — CALCITRIOL 1 MICROGRAM(S): 0.5 CAPSULE ORAL at 13:00

## 2017-09-18 RX ADMIN — PIPERACILLIN AND TAZOBACTAM 200 GRAM(S): 4; .5 INJECTION, POWDER, LYOPHILIZED, FOR SOLUTION INTRAVENOUS at 13:00

## 2017-09-18 RX ADMIN — Medication 100 UNIT(S): at 13:00

## 2017-09-18 RX ADMIN — ONDANSETRON 4 MILLIGRAM(S): 8 TABLET, FILM COATED ORAL at 09:34

## 2017-09-18 RX ADMIN — ENOXAPARIN SODIUM 30 MILLIGRAM(S): 100 INJECTION SUBCUTANEOUS at 17:19

## 2017-09-18 RX ADMIN — HYDROMORPHONE HYDROCHLORIDE 1 MILLIGRAM(S): 2 INJECTION INTRAMUSCULAR; INTRAVENOUS; SUBCUTANEOUS at 11:40

## 2017-09-18 RX ADMIN — Medication 50 MILLIGRAM(S): at 21:13

## 2017-09-18 RX ADMIN — Medication 5 MILLIGRAM(S): at 21:13

## 2017-09-18 RX ADMIN — LOSARTAN POTASSIUM 50 MILLIGRAM(S): 100 TABLET, FILM COATED ORAL at 06:26

## 2017-09-18 RX ADMIN — PIPERACILLIN AND TAZOBACTAM 200 GRAM(S): 4; .5 INJECTION, POWDER, LYOPHILIZED, FOR SOLUTION INTRAVENOUS at 17:19

## 2017-09-18 RX ADMIN — PANTOPRAZOLE SODIUM 40 MILLIGRAM(S): 20 TABLET, DELAYED RELEASE ORAL at 13:00

## 2017-09-18 RX ADMIN — HYDROMORPHONE HYDROCHLORIDE 1 MILLIGRAM(S): 2 INJECTION INTRAMUSCULAR; INTRAVENOUS; SUBCUTANEOUS at 11:25

## 2017-09-18 RX ADMIN — PIPERACILLIN AND TAZOBACTAM 200 GRAM(S): 4; .5 INJECTION, POWDER, LYOPHILIZED, FOR SOLUTION INTRAVENOUS at 23:03

## 2017-09-18 RX ADMIN — Medication 5 MILLIGRAM(S): at 00:04

## 2017-09-18 RX ADMIN — Medication 1 APPLICATION(S): at 13:00

## 2017-09-18 RX ADMIN — Medication 1 EACH: at 17:19

## 2017-09-18 RX ADMIN — ENOXAPARIN SODIUM 30 MILLIGRAM(S): 100 INJECTION SUBCUTANEOUS at 06:26

## 2017-09-18 NOTE — CHART NOTE - NSCHARTNOTEFT_GEN_A_CORE
Admitting Diagnosis:   Patient is a 57y old  Female who presents with a chief complaint of enterocutaneous fistula (24 Jul 2017 14:15)      PAST MEDICAL & SURGICAL HISTORY:  Reflux  Obesity  DVT (deep venous thrombosis): 2013 neck  Diverticulitis  Hypertension  Elective surgery: abodominal wall surgery  dec 2016  Elective surgery: NOV 2016 gastric bypass revision  Gastric bypass status for obesity: gastric sleeve, 6/2012  S/P breast biopsy: 2011, left  S/P colon resection: 2011  S/P knee surgery: repair 2009, 2011  right; left  Umbilical hernia: 2000  S/P appendectomy: 1975  S/P tonsillectomy: 1967      Current Nutrition Order:  TPN via PICC receiving 104g AA, 254g Dex, no lipids today (1279kcal,  2g pro/kg IBW, GIR: 3.68)     PO Intake: Good (%) [   ]  Fair (50-75%) [   ] Poor (<25%) [   ] Pt is strict NPO.    GI Issues: C/o of nausea. Taking zofran.    Pain: Pain being managed.    Skin Integrity: DTI & Stage 2 pressure ulcer, surgical incision w/ wound vac.      Labs:   09-18    138  |  100  |  26<H>  ----------------------------<  175<H>  3.8   |  22  |  0.60    Ca    9.4      18 Sep 2017 07:26  Phos  2.3     09-18  Mg     1.9     09-18      CAPILLARY BLOOD GLUCOSE  139 (17 Sep 2017 23:59)  133 (17 Sep 2017 18:07)          Medications:  MEDICATIONS  (STANDING):  insulin lispro (HumaLOG) corrective regimen sliding scale   SubCutaneous every 6 hours  dextrose 5%. 1000 milliLiter(s) (50 mL/Hr) IV Continuous <Continuous>  dextrose 50% Injectable 25 Gram(s) IV Push once  sodium chloride 0.9% lock flush 20 milliLiter(s) IV Push once  cyanocobalamin Injectable 1000 MICROGram(s) SubCutaneous every 7 days  dextrose 50% Injectable 12.5 Gram(s) IV Push once  nystatin Powder 1 Application(s) Topical two times a day  pantoprazole  Injectable 40 milliGRAM(s) IV Push daily  calcitriol Injectable 1 MICROGram(s) IV Push <User Schedule>  collagenase Ointment 1 Application(s) Topical daily  piperacillin/tazobactam IVPB. 4.5 Gram(s) IV Intermittent every 6 hours  escitalopram 20 milliGRAM(s) Oral daily  losartan 50 milliGRAM(s) Oral daily  heparin  flush 100 Units/mL Injectable 100 Unit(s) IV Push every other day  enoxaparin Injectable 30 milliGRAM(s) SubCutaneous two times a day  diazepam    Tablet 5 milliGRAM(s) Oral at bedtime  Parenteral Nutrition - Adult 1 Each (64 mL/Hr) TPN Continuous <Continuous>  diphenhydrAMINE   Injectable 50 milliGRAM(s) IV Push at bedtime  Parenteral Nutrition - Adult 1 Each (64 mL/Hr) TPN Continuous <Continuous>    MEDICATIONS  (PRN):  ondansetron Injectable 4 milliGRAM(s) IV Push every 6 hours PRN Nausea  sodium chloride 0.9% lock flush 10 milliLiter(s) IV Push every 1 hour PRN After each medication administration  sodium chloride 0.9% lock flush 10 milliLiter(s) IV Push every 12 hours PRN Lumen of catheter NOT used      Weight: (9/1) 99.5kg  Daily      Weight Change: no new wt noted--need updates w/ wt    Estimated energy needs:    Estimated energy needs: remain unchanged from the previous f/u:  Estimated energy needs using 52 kg IBW:  30-35 kcal/kg (3319-5451 kcal).   1.8-2 g/kg ( g protein).  30-35 mL/kg (3513-5962 mL fluid).     Subjective:   Pt remains on TPN via PICC receiving 104g AA, 254g Dex, no lipids. Full liquid diet order D/C'd now strict NPO. fistula draining. Malecot in enterotomy site, wound vac to midline. Pt c/o nausea, states zofran has not fully relieved discomfort. No known wt changes. Noted: DTI & Stage 2 pressure ulcer. TPN providing 100% estimated needs. Continue until pt can meet 60% estimated needs PO. Skin: surgical incision w/ wound vac in place.    Previous Nutrition Diagnosis: Increased nutrient needs RT fistula & s/p surgery AEB increased kcal/protein needs.     Active [  X]  Resolved [   ]    Goal: PT to meet >75% needs via tolerated route    Recommendations:   1) continue on TPN with current order and trial clears once medically feasible.  2) Recommend daily MVI    Education: Pt is understanding of meeting nutrient needs via TPN.     Risk Level: High [ x  ] Moderate [  ] Low [   ]. Admitting Diagnosis:   Patient is a 57y old  Female who presents with a chief complaint of enterocutaneous fistula (24 Jul 2017 14:15)      PAST MEDICAL & SURGICAL HISTORY:  Reflux  Obesity  DVT (deep venous thrombosis): 2013 neck  Diverticulitis  Hypertension  Elective surgery: abodominal wall surgery  dec 2016  Elective surgery: NOV 2016 gastric bypass revision  Gastric bypass status for obesity: gastric sleeve, 6/2012  S/P breast biopsy: 2011, left  S/P colon resection: 2011  S/P knee surgery: repair 2009, 2011  right; left  Umbilical hernia: 2000  S/P appendectomy: 1975  S/P tonsillectomy: 1967      Current Nutrition Order:  TPN via PICC receiving 104g AA, 254g Dex, no lipids today (1279kcal,  2g pro/kg IBW, GIR: 3.68)     PO Intake: Good (%) [   ]  Fair (50-75%) [   ] Poor (<25%) [   ] Pt is strict NPO.    GI Issues: C/o of nausea. Taking zofran.    Pain: Pain being managed.    Skin Integrity: DTI & Stage 2 pressure ulcer, surgical incision w/ wound vac.      Labs:   09-18    138  |  100  |  26<H>  ----------------------------<  175<H>  3.8   |  22  |  0.60    Ca    9.4      18 Sep 2017 07:26  Phos  2.3     09-18  Mg     1.9     09-18      CAPILLARY BLOOD GLUCOSE  139 (17 Sep 2017 23:59)  133 (17 Sep 2017 18:07)          Medications:  MEDICATIONS  (STANDING):  insulin lispro (HumaLOG) corrective regimen sliding scale   SubCutaneous every 6 hours  dextrose 5%. 1000 milliLiter(s) (50 mL/Hr) IV Continuous <Continuous>  dextrose 50% Injectable 25 Gram(s) IV Push once  sodium chloride 0.9% lock flush 20 milliLiter(s) IV Push once  cyanocobalamin Injectable 1000 MICROGram(s) SubCutaneous every 7 days  dextrose 50% Injectable 12.5 Gram(s) IV Push once  nystatin Powder 1 Application(s) Topical two times a day  pantoprazole  Injectable 40 milliGRAM(s) IV Push daily  calcitriol Injectable 1 MICROGram(s) IV Push <User Schedule>  collagenase Ointment 1 Application(s) Topical daily  piperacillin/tazobactam IVPB. 4.5 Gram(s) IV Intermittent every 6 hours  escitalopram 20 milliGRAM(s) Oral daily  losartan 50 milliGRAM(s) Oral daily  heparin  flush 100 Units/mL Injectable 100 Unit(s) IV Push every other day  enoxaparin Injectable 30 milliGRAM(s) SubCutaneous two times a day  diazepam    Tablet 5 milliGRAM(s) Oral at bedtime  Parenteral Nutrition - Adult 1 Each (64 mL/Hr) TPN Continuous <Continuous>  diphenhydrAMINE   Injectable 50 milliGRAM(s) IV Push at bedtime  Parenteral Nutrition - Adult 1 Each (64 mL/Hr) TPN Continuous <Continuous>    MEDICATIONS  (PRN):  ondansetron Injectable 4 milliGRAM(s) IV Push every 6 hours PRN Nausea  sodium chloride 0.9% lock flush 10 milliLiter(s) IV Push every 1 hour PRN After each medication administration  sodium chloride 0.9% lock flush 10 milliLiter(s) IV Push every 12 hours PRN Lumen of catheter NOT used      Weight: (9/1) 99.5kg  Daily      Weight Change: no new wt noted--need updates w/ wt    Estimated energy needs:    Estimated energy needs: remain unchanged from the previous f/u:  Estimated energy needs using 52 kg IBW:  30-35 kcal/kg (1165-9771 kcal).   1.8-2 g/kg ( g protein).  30-35 mL/kg (7078-2401 mL fluid).     Subjective:   Pt remains on TPN via PICC receiving 104g AA, 254g Dex, no lipids. Full liquid diet order D/C'd now strict NPO. fistula draining. Malecot in enterotomy site, wound vac to midline. Pt c/o nausea, states zofran has not fully relieved discomfort. No known wt changes. Noted: DTI & Stage 2 pressure ulcer. TPN providing 100% estimated needs. Continue until pt can meet 60% estimated needs PO. Skin: surgical incision w/ wound vac in place.    Previous Nutrition Diagnosis: Increased nutrient needs RT fistula & s/p surgery AEB increased kcal/protein needs.     Active [  X]  Resolved [   ]    Goal: PT to meet >75% needs via tolerated route    Recommendations:   1) continue on TPN with current order and trial clears once medically feasible.  2) Recommend daily MVI  3) Recommend adding lipids every 3-4 days   4) Check labs for signs of fat malabsorption, consider ADEK vitamin.     Education: Pt is understanding of meeting nutrient needs via TPN.     Risk Level: High [ x  ] Moderate [  ] Low [   ].

## 2017-09-18 NOTE — PROGRESS NOTE ADULT - SUBJECTIVE AND OBJECTIVE BOX
O/N : DC Narcotics (as per 's) only give narcotics during VAC change. Benadryl changed to 50mg only at bedtime.  9/17: benadryl given O/N : DC Narcotics (as per 's) only give narcotics during VAC change. Benadryl changed to 50mg only at bedtime.  9/17: benadryl given     SUBJECTIVE: Patient seen and examined bedside by chief resident. leaking from site of vac. slept well.     piperacillin/tazobactam IVPB. 4.5 Gram(s) IV Intermittent every 6 hours  losartan 50 milliGRAM(s) Oral daily  heparin  flush 100 Units/mL Injectable 100 Unit(s) IV Push every other day  enoxaparin Injectable 30 milliGRAM(s) SubCutaneous two times a day      Vital Signs Last 24 Hrs  T(C): 37.1 (17 Sep 2017 22:45), Max: 37.1 (17 Sep 2017 16:33)  T(F): 98.8 (17 Sep 2017 22:45), Max: 98.8 (17 Sep 2017 22:45)  HR: 75 (17 Sep 2017 22:45) (63 - 77)  BP: 128/81 (17 Sep 2017 22:45) (125/76 - 131/83)  BP(mean): --  RR: 16 (17 Sep 2017 22:45) (16 - 18)  SpO2: 98% (17 Sep 2017 22:45) (96% - 98%)  I&O's Detail    17 Sep 2017 07:01  -  18 Sep 2017 07:00  --------------------------------------------------------  IN:    Solution: 100 mL    TPN (Total Parenteral Nutrition): 1024 mL  Total IN: 1124 mL    OUT:    VAC (Vacuum Assisted Closure) System: 250 mL    Voided: 1500 mL  Total OUT: 1750 mL    Total NET: -626 mL          General: NAD, resting comfortably in bed  C/V: NSR  Pulm: Nonlabored breathing, no respiratory distress  Abd: soft, NT/ND. vac in place, with some leakage in area.   Extrem: WWP, no edema, SCDs in place        LABS:                        10.6   6.6   )-----------( 222      ( 18 Sep 2017 07:23 )             33.7     09-18    138  |  100  |  26<H>  ----------------------------<  175<H>  3.8   |  22  |  0.60    Ca    9.4      18 Sep 2017 07:26  Phos  2.3     09-18  Mg     1.9     09-18

## 2017-09-18 NOTE — PROGRESS NOTE ADULT - ASSESSMENT
57 F s/p  SBR, fistula resection, esophagojejunostomy revision w/ post-op course complicated by RTOR for distal anastomosis breakdown, ATN, acute respiratory failure, & septic shock. Currently improving, on TPN.    NPO/TPN/IVF  Pain/nausea control - As per , do not give any narcotic except during VAC change, and benadryl 50mg at bedtime only.  OOTB/IS  cont IV Zosyn  L IJ  Nystatin powder  SCDs, SQH, OOBA  Drains: Malecot in enterotomy site, wound vac to midline  Wounds: Midline xiphoid to pubis incision; DTI & stage 2 pressure ulcer noted.

## 2017-09-18 NOTE — ADVANCED PRACTICE NURSE CONSULT - ASSESSMENT
VAC dressing leaking. Fistula in wound bed at 5 o'clock draining large amounts of yellow effluent. Wound bed with pale red granulating tissue measuring 16 cm x 13.5 cm. Periwound fungal rash noted from 12-6 o'clock. Discussed with Dr Brady isolating fistula from VAC dressing. Applied Nystatin powder and liquid skin barrier to fungal rash, Capo rings to periwound, 2 layers of Adaptic Touch to wound bed, then black Granufoam to wound bed. Isolated fistula with ostomy rings and applied 1 piece urostomy pouch. Maintained VAC at 125 mm/Hg.   House staff Pete present and assisted with dressing change.

## 2017-09-18 NOTE — PROGRESS NOTE ADULT - SUBJECTIVE AND OBJECTIVE BOX
remains stable  has called me on cell incessantly about benadryl and medications   n0 pain but obviously difficult situation which I am sympathetic about but  very concerned that is filing her nails complaining off       8 out of 10 pain  we continue to struggle to control wound  the fistula is easily visulized and the tube placed by dr faith clearly in place  but drains very little despite suction  instead there is thicker spillage  we will try to isolate  I do not think there is strategy to that will allow to close  think we will be forced until there is cracking and will have to close surgically  at this point has had frozen abdomen including first time we returned  goal would be to get wound to stable thing and then take back in several months

## 2017-09-19 LAB
ALBUMIN SERPL ELPH-MCNC: 3.2 G/DL — LOW (ref 3.3–5)
ALP SERPL-CCNC: 149 U/L — HIGH (ref 40–120)
ALT FLD-CCNC: 12 U/L — SIGNIFICANT CHANGE UP (ref 10–45)
ANION GAP SERPL CALC-SCNC: 15 MMOL/L — SIGNIFICANT CHANGE UP (ref 5–17)
AST SERPL-CCNC: 11 U/L — SIGNIFICANT CHANGE UP (ref 10–40)
BILIRUB SERPL-MCNC: 1.2 MG/DL — SIGNIFICANT CHANGE UP (ref 0.2–1.2)
BUN SERPL-MCNC: 28 MG/DL — HIGH (ref 7–23)
CALCIUM SERPL-MCNC: 10.3 MG/DL — SIGNIFICANT CHANGE UP (ref 8.4–10.5)
CHLORIDE SERPL-SCNC: 104 MMOL/L — SIGNIFICANT CHANGE UP (ref 96–108)
CHOLEST SERPL-MCNC: 135 MG/DL — SIGNIFICANT CHANGE UP (ref 10–199)
CO2 SERPL-SCNC: 22 MMOL/L — SIGNIFICANT CHANGE UP (ref 22–31)
CREAT SERPL-MCNC: 0.62 MG/DL — SIGNIFICANT CHANGE UP (ref 0.5–1.3)
GLUCOSE SERPL-MCNC: 160 MG/DL — HIGH (ref 70–99)
HCT VFR BLD CALC: 36.8 % — SIGNIFICANT CHANGE UP (ref 34.5–45)
HDLC SERPL-MCNC: 16 MG/DL — LOW (ref 40–125)
HGB BLD-MCNC: 11.5 G/DL — SIGNIFICANT CHANGE UP (ref 11.5–15.5)
LIPID PNL WITH DIRECT LDL SERPL: 93 MG/DL — SIGNIFICANT CHANGE UP
MAGNESIUM SERPL-MCNC: 2.3 MG/DL — SIGNIFICANT CHANGE UP (ref 1.6–2.6)
MCHC RBC-ENTMCNC: 30 PG — SIGNIFICANT CHANGE UP (ref 27–34)
MCHC RBC-ENTMCNC: 31.3 G/DL — LOW (ref 32–36)
MCV RBC AUTO: 96.1 FL — SIGNIFICANT CHANGE UP (ref 80–100)
PHOSPHATE SERPL-MCNC: 2.9 MG/DL — SIGNIFICANT CHANGE UP (ref 2.5–4.5)
PLATELET # BLD AUTO: 243 K/UL — SIGNIFICANT CHANGE UP (ref 150–400)
POTASSIUM SERPL-MCNC: 3.9 MMOL/L — SIGNIFICANT CHANGE UP (ref 3.5–5.3)
POTASSIUM SERPL-SCNC: 3.9 MMOL/L — SIGNIFICANT CHANGE UP (ref 3.5–5.3)
PROT SERPL-MCNC: 7.3 G/DL — SIGNIFICANT CHANGE UP (ref 6–8.3)
RBC # BLD: 3.83 M/UL — SIGNIFICANT CHANGE UP (ref 3.8–5.2)
RBC # FLD: 19.1 % — HIGH (ref 10.3–16.9)
SODIUM SERPL-SCNC: 141 MMOL/L — SIGNIFICANT CHANGE UP (ref 135–145)
TOTAL CHOLESTEROL/HDL RATIO MEASUREMENT: 8.4 RATIO — HIGH (ref 3.3–7.1)
TRIGL SERPL-MCNC: 132 MG/DL — SIGNIFICANT CHANGE UP (ref 10–149)
WBC # BLD: 4.3 K/UL — SIGNIFICANT CHANGE UP (ref 3.8–10.5)
WBC # FLD AUTO: 4.3 K/UL — SIGNIFICANT CHANGE UP (ref 3.8–10.5)

## 2017-09-19 RX ORDER — ACETAMINOPHEN 500 MG
1000 TABLET ORAL ONCE
Qty: 0 | Refills: 0 | Status: COMPLETED | OUTPATIENT
Start: 2017-09-19 | End: 2017-09-19

## 2017-09-19 RX ORDER — ELECTROLYTE SOLUTION,INJ
1 VIAL (ML) INTRAVENOUS
Qty: 0 | Refills: 0 | Status: DISCONTINUED | OUTPATIENT
Start: 2017-09-19 | End: 2017-09-19

## 2017-09-19 RX ADMIN — PIPERACILLIN AND TAZOBACTAM 200 GRAM(S): 4; .5 INJECTION, POWDER, LYOPHILIZED, FOR SOLUTION INTRAVENOUS at 12:49

## 2017-09-19 RX ADMIN — ENOXAPARIN SODIUM 30 MILLIGRAM(S): 100 INJECTION SUBCUTANEOUS at 05:24

## 2017-09-19 RX ADMIN — Medication 50 MILLIGRAM(S): at 21:28

## 2017-09-19 RX ADMIN — Medication 1 APPLICATION(S): at 12:50

## 2017-09-19 RX ADMIN — Medication 5 MILLIGRAM(S): at 21:28

## 2017-09-19 RX ADMIN — PANTOPRAZOLE SODIUM 40 MILLIGRAM(S): 20 TABLET, DELAYED RELEASE ORAL at 12:49

## 2017-09-19 RX ADMIN — Medication 1000 MILLIGRAM(S): at 22:00

## 2017-09-19 RX ADMIN — NYSTATIN CREAM 1 APPLICATION(S): 100000 CREAM TOPICAL at 17:45

## 2017-09-19 RX ADMIN — ESCITALOPRAM OXALATE 20 MILLIGRAM(S): 10 TABLET, FILM COATED ORAL at 21:28

## 2017-09-19 RX ADMIN — ENOXAPARIN SODIUM 30 MILLIGRAM(S): 100 INJECTION SUBCUTANEOUS at 17:44

## 2017-09-19 RX ADMIN — Medication 1 EACH: at 17:44

## 2017-09-19 RX ADMIN — LOSARTAN POTASSIUM 50 MILLIGRAM(S): 100 TABLET, FILM COATED ORAL at 05:24

## 2017-09-19 RX ADMIN — PIPERACILLIN AND TAZOBACTAM 200 GRAM(S): 4; .5 INJECTION, POWDER, LYOPHILIZED, FOR SOLUTION INTRAVENOUS at 05:24

## 2017-09-19 RX ADMIN — PIPERACILLIN AND TAZOBACTAM 200 GRAM(S): 4; .5 INJECTION, POWDER, LYOPHILIZED, FOR SOLUTION INTRAVENOUS at 17:44

## 2017-09-19 RX ADMIN — Medication 400 MILLIGRAM(S): at 12:49

## 2017-09-19 RX ADMIN — Medication 400 MILLIGRAM(S): at 21:28

## 2017-09-19 RX ADMIN — Medication 2: at 06:18

## 2017-09-19 RX ADMIN — Medication 1000 MILLIGRAM(S): at 14:18

## 2017-09-19 RX ADMIN — NYSTATIN CREAM 1 APPLICATION(S): 100000 CREAM TOPICAL at 05:24

## 2017-09-19 NOTE — PROGRESS NOTE ADULT - SUBJECTIVE AND OBJECTIVE BOX
O/N: VSS, BRAYDEN  9/18: Vac Change. OVERNIGHT EVENTS: VSS, BRAYDEN  9/18: Vac Changed    STATUS POST:    7/24: SBR resection, fistula resection, revision of esophagogegunostomy drain through esophageal hiatus in R mediastinum  7/29: IR drainage of 800 cc of succus  7/29: Ex-lap, SB anastamosis in BP limb breakdown with succus throughout abdomen  8/1: abd washout, drainage of abscess, biologic mesh placement, closure of abdominal wall, vac and retention suture  8/7: abd washout, mesh removal, frozen abd noted, LLQ CHECO replaced with benson drain  8/10: leak noted from previous anastamosis site on L side, mid abdomen malecot drain placed in enterotomy, JPs x 2 placed, necrotic fascia debrided, vicryl mesh sutured to fascia circumferentially, xeroform over mesh then vac dressing placed	    POST OPERATIVE DAY #:     SUBJECTIVE: Patient denies any complaints  Flatus: [X] YES [] NO             Bowel Movement: X[ ] YES [ ] NO  Pain (0-10):            Pain Control Adequate: [X ] YES [ ] NO  Nausea: [ ] YES [X ] NO            Vomiting: [ ] YES [X] NO  Diarrhea: [ ] YES [ X] NO         Constipation: [ ] YES [ X] NO     Chest Pain: [ ] YES [ X] NO    SOB:  [ ] YES [X ] NO    piperacillin/tazobactam IVPB. 4.5 Gram(s) IV Intermittent every 6 hours  losartan 50 milliGRAM(s) Oral daily  heparin  flush 100 Units/mL Injectable 100 Unit(s) IV Push every other day  enoxaparin Injectable 30 milliGRAM(s) SubCutaneous two times a day      Vital Signs Last 24 Hrs  T(C): 36 (19 Sep 2017 05:58), Max: 37.8 (18 Sep 2017 14:47)  T(F): 96.8 (19 Sep 2017 05:58), Max: 100 (18 Sep 2017 14:47)  HR: 66 (19 Sep 2017 05:58) (66 - 101)  BP: 159/110 (19 Sep 2017 05:58) (139/90 - 159/110)  BP(mean): --  RR: 18 (19 Sep 2017 05:58) (16 - 18)  SpO2: 95% (19 Sep 2017 05:58) (95% - 100%)  I&O's Detail    18 Sep 2017 07:01  -  19 Sep 2017 07:00  --------------------------------------------------------  IN:    Solution: 200 mL    TPN (Total Parenteral Nutrition): 640 mL  Total IN: 840 mL    OUT:    Voided: 1500 mL  Total OUT: 1500 mL    Total NET: -660 mL          General: NAD, resting comfortably in bed  C/V: NSR  Pulm: Nonlabored breathing, no respiratory distress  Abd: soft, non distended , TTP around wound vac site. Wound vac functioning   Extrem: WWP, no edema, SCDs in place  Drains:        LABS:                        10.6   6.6   )-----------( 222      ( 18 Sep 2017 07:23 )             33.7     09-18    138  |  100  |  26<H>  ----------------------------<  175<H>  3.8   |  22  |  0.60    Ca    9.4      18 Sep 2017 07:26  Phos  2.3     09-18  Mg     1.9     09-18

## 2017-09-20 LAB
ANION GAP SERPL CALC-SCNC: 14 MMOL/L — SIGNIFICANT CHANGE UP (ref 5–17)
BUN SERPL-MCNC: 25 MG/DL — HIGH (ref 7–23)
CALCIUM SERPL-MCNC: 9.9 MG/DL — SIGNIFICANT CHANGE UP (ref 8.4–10.5)
CHLORIDE SERPL-SCNC: 101 MMOL/L — SIGNIFICANT CHANGE UP (ref 96–108)
CHOLEST SERPL-MCNC: 124 MG/DL — SIGNIFICANT CHANGE UP (ref 10–199)
CO2 SERPL-SCNC: 23 MMOL/L — SIGNIFICANT CHANGE UP (ref 22–31)
CREAT SERPL-MCNC: 0.55 MG/DL — SIGNIFICANT CHANGE UP (ref 0.5–1.3)
GLUCOSE SERPL-MCNC: 156 MG/DL — HIGH (ref 70–99)
HDLC SERPL-MCNC: 17 MG/DL — LOW (ref 40–125)
LIPID PNL WITH DIRECT LDL SERPL: 81 MG/DL — SIGNIFICANT CHANGE UP
MAGNESIUM SERPL-MCNC: 1.9 MG/DL — SIGNIFICANT CHANGE UP (ref 1.6–2.6)
PHOSPHATE SERPL-MCNC: 2.4 MG/DL — LOW (ref 2.5–4.5)
POTASSIUM SERPL-MCNC: 3.6 MMOL/L — SIGNIFICANT CHANGE UP (ref 3.5–5.3)
POTASSIUM SERPL-SCNC: 3.6 MMOL/L — SIGNIFICANT CHANGE UP (ref 3.5–5.3)
SODIUM SERPL-SCNC: 138 MMOL/L — SIGNIFICANT CHANGE UP (ref 135–145)
TOTAL CHOLESTEROL/HDL RATIO MEASUREMENT: 7.3 RATIO — HIGH (ref 3.3–7.1)
TRIGL SERPL-MCNC: 131 MG/DL — SIGNIFICANT CHANGE UP (ref 10–149)

## 2017-09-20 PROCEDURE — 97606 NEG PRS WND THER DME>50 SQCM: CPT

## 2017-09-20 RX ORDER — DIAZEPAM 5 MG
5 TABLET ORAL AT BEDTIME
Qty: 0 | Refills: 0 | Status: DISCONTINUED | OUTPATIENT
Start: 2017-09-21 | End: 2017-09-26

## 2017-09-20 RX ORDER — ELECTROLYTE SOLUTION,INJ
1 VIAL (ML) INTRAVENOUS
Qty: 0 | Refills: 0 | Status: DISCONTINUED | OUTPATIENT
Start: 2017-09-20 | End: 2017-09-20

## 2017-09-20 RX ORDER — ACETAMINOPHEN 500 MG
1000 TABLET ORAL ONCE
Qty: 0 | Refills: 0 | Status: COMPLETED | OUTPATIENT
Start: 2017-09-20 | End: 2017-09-20

## 2017-09-20 RX ORDER — I.V. FAT EMULSION 20 G/100ML
50 EMULSION INTRAVENOUS
Qty: 0 | Refills: 0 | Status: DISCONTINUED | OUTPATIENT
Start: 2017-09-20 | End: 2017-09-20

## 2017-09-20 RX ORDER — HYDROMORPHONE HYDROCHLORIDE 2 MG/ML
1 INJECTION INTRAMUSCULAR; INTRAVENOUS; SUBCUTANEOUS ONCE
Qty: 0 | Refills: 0 | Status: DISCONTINUED | OUTPATIENT
Start: 2017-09-20 | End: 2017-09-20

## 2017-09-20 RX ADMIN — Medication 5 MILLIGRAM(S): at 23:55

## 2017-09-20 RX ADMIN — ENOXAPARIN SODIUM 30 MILLIGRAM(S): 100 INJECTION SUBCUTANEOUS at 17:50

## 2017-09-20 RX ADMIN — PIPERACILLIN AND TAZOBACTAM 200 GRAM(S): 4; .5 INJECTION, POWDER, LYOPHILIZED, FOR SOLUTION INTRAVENOUS at 00:00

## 2017-09-20 RX ADMIN — HYDROMORPHONE HYDROCHLORIDE 1 MILLIGRAM(S): 2 INJECTION INTRAMUSCULAR; INTRAVENOUS; SUBCUTANEOUS at 12:45

## 2017-09-20 RX ADMIN — Medication 400 MILLIGRAM(S): at 14:25

## 2017-09-20 RX ADMIN — Medication 100 UNIT(S): at 13:34

## 2017-09-20 RX ADMIN — Medication 1000 MILLIGRAM(S): at 15:00

## 2017-09-20 RX ADMIN — HYDROMORPHONE HYDROCHLORIDE 1 MILLIGRAM(S): 2 INJECTION INTRAMUSCULAR; INTRAVENOUS; SUBCUTANEOUS at 12:03

## 2017-09-20 RX ADMIN — ENOXAPARIN SODIUM 30 MILLIGRAM(S): 100 INJECTION SUBCUTANEOUS at 06:00

## 2017-09-20 RX ADMIN — LOSARTAN POTASSIUM 50 MILLIGRAM(S): 100 TABLET, FILM COATED ORAL at 05:18

## 2017-09-20 RX ADMIN — NYSTATIN CREAM 1 APPLICATION(S): 100000 CREAM TOPICAL at 06:00

## 2017-09-20 RX ADMIN — Medication 1 EACH: at 18:43

## 2017-09-20 RX ADMIN — ESCITALOPRAM OXALATE 20 MILLIGRAM(S): 10 TABLET, FILM COATED ORAL at 22:00

## 2017-09-20 RX ADMIN — NYSTATIN CREAM 1 APPLICATION(S): 100000 CREAM TOPICAL at 17:52

## 2017-09-20 RX ADMIN — PREGABALIN 1000 MICROGRAM(S): 225 CAPSULE ORAL at 13:33

## 2017-09-20 RX ADMIN — CALCITRIOL 1 MICROGRAM(S): 0.5 CAPSULE ORAL at 13:34

## 2017-09-20 RX ADMIN — PANTOPRAZOLE SODIUM 40 MILLIGRAM(S): 20 TABLET, DELAYED RELEASE ORAL at 13:34

## 2017-09-20 RX ADMIN — PIPERACILLIN AND TAZOBACTAM 200 GRAM(S): 4; .5 INJECTION, POWDER, LYOPHILIZED, FOR SOLUTION INTRAVENOUS at 13:33

## 2017-09-20 RX ADMIN — PIPERACILLIN AND TAZOBACTAM 200 GRAM(S): 4; .5 INJECTION, POWDER, LYOPHILIZED, FOR SOLUTION INTRAVENOUS at 23:55

## 2017-09-20 RX ADMIN — Medication 1 APPLICATION(S): at 17:53

## 2017-09-20 RX ADMIN — PIPERACILLIN AND TAZOBACTAM 200 GRAM(S): 4; .5 INJECTION, POWDER, LYOPHILIZED, FOR SOLUTION INTRAVENOUS at 17:50

## 2017-09-20 RX ADMIN — Medication 50 MILLIGRAM(S): at 22:00

## 2017-09-20 RX ADMIN — PIPERACILLIN AND TAZOBACTAM 200 GRAM(S): 4; .5 INJECTION, POWDER, LYOPHILIZED, FOR SOLUTION INTRAVENOUS at 06:00

## 2017-09-20 RX ADMIN — I.V. FAT EMULSION 20.83 GRAM(S): 20 EMULSION INTRAVENOUS at 18:42

## 2017-09-20 NOTE — PROGRESS NOTE ADULT - SUBJECTIVE AND OBJECTIVE BOX
On interval followup, the left int jugular catheter site is clean, dry and non-inflamed. Afebrile. Notes and cultures are followed.

## 2017-09-20 NOTE — PROGRESS NOTE ADULT - SUBJECTIVE AND OBJECTIVE BOX
o/n: VSS, BRAYDEN  9/19: Patient c/o pain  given Ofirmev /103 OVERNIGHT EVENTS:  VSS, BRAYDEN  9/19: Patient c/o pain  given Ofirmev /103    STATUS POST:    7/24: SBR resection, fistula resection, revision of esophagogegunostomy drain through esophageal hiatus in R mediastinum  7/29: IR drainage of 800 cc of succus  7/29: Ex-lap, SB anastamosis in BP limb breakdown with succus throughout abdomen  8/1: abd washout, drainage of abscess, biologic mesh placement, closure of abdominal wall, vac and retention suture  8/7: abd washout, mesh removal, frozen abd noted, LLQ CHECO replaced with benson drain  8/10: leak noted from previous anastamosis site on L side, mid abdomen malecot drain placed in enterotomy, JPs x 2 placed, necrotic fascia debrided, vicryl mesh sutured to fascia circumferentially, xeroform over mesh then vac dressing placed	    SUBJECTIVE: Patient denies any complaints.  Flatus: [] YES [X] NO             Bowel Movement: [ ] YES [X ] NO  Pain (0-10):            Pain Control Adequate: [ X] YES [ ] NO  Nausea: [ ] YES [X ] NO            Vomiting: [ ] YES [X] NO  Diarrhea: [ ] YES [X ] NO         Constipation: [ ] YES [X ] NO     Chest Pain: [ ] YES [ X] NO    SOB:  [ ] YES [X ] NO    piperacillin/tazobactam IVPB. 4.5 Gram(s) IV Intermittent every 6 hours  losartan 50 milliGRAM(s) Oral daily  heparin  flush 100 Units/mL Injectable 100 Unit(s) IV Push every other day  enoxaparin Injectable 30 milliGRAM(s) SubCutaneous two times a day      Vital Signs Last 24 Hrs  T(C): 36.7 (20 Sep 2017 05:33), Max: 36.9 (19 Sep 2017 20:30)  T(F): 98.1 (20 Sep 2017 05:33), Max: 98.5 (19 Sep 2017 20:30)  HR: 70 (20 Sep 2017 05:33) (70 - 76)  BP: 173/102 (20 Sep 2017 05:33) (163/102 - 178/113)  BP(mean): --  RR: 19 (20 Sep 2017 05:33) (16 - 19)  SpO2: 95% (20 Sep 2017 05:33) (95% - 97%)  I&O's Detail    19 Sep 2017 07:01  -  20 Sep 2017 07:00  --------------------------------------------------------  IN:    Oral Fluid: 200 mL    Solution: 100 mL    Solution: 300 mL    TPN (Total Parenteral Nutrition): 1344 mL  Total IN: 1944 mL    OUT:    Drain: 100 mL    Voided: 300 mL  Total OUT: 400 mL    Total NET: 1544 mL      20 Sep 2017 07:01  -  20 Sep 2017 10:56  --------------------------------------------------------  IN:    TPN (Total Parenteral Nutrition): 64 mL  Total IN: 64 mL    OUT:  Total OUT: 0 mL    Total NET: 64 mL          General: NAD, resting comfortably in bed  C/V: NSR  Pulm: Nonlabored breathing, no respiratory distress  Abd: soft, non-distended, TTP around incision site wound vac, incision clean dry and intact  Extrem: WWP, no edema, SCDs in place  Drains: Putting out bilious fluid         LABS:                        11.5   4.3   )-----------( 243      ( 19 Sep 2017 07:12 )             36.8     09-20    138  |  101  |  25<H>  ----------------------------<  156<H>  3.6   |  23  |  0.55    Ca    9.9      20 Sep 2017 07:56  Phos  2.4     09-20  Mg     1.9     09-20    TPro  7.3  /  Alb  3.2<L>  /  TBili  1.2  /  DBili  x   /  AST  11  /  ALT  12  /  AlkPhos  149<H>  09-19

## 2017-09-21 LAB
ALBUMIN SERPL ELPH-MCNC: 3.2 G/DL — LOW (ref 3.3–5)
ALP SERPL-CCNC: 189 U/L — HIGH (ref 40–120)
ALT FLD-CCNC: 22 U/L — SIGNIFICANT CHANGE UP (ref 10–45)
ANION GAP SERPL CALC-SCNC: 16 MMOL/L — SIGNIFICANT CHANGE UP (ref 5–17)
AST SERPL-CCNC: 17 U/L — SIGNIFICANT CHANGE UP (ref 10–40)
BILIRUB SERPL-MCNC: 0.9 MG/DL — SIGNIFICANT CHANGE UP (ref 0.2–1.2)
BUN SERPL-MCNC: 26 MG/DL — HIGH (ref 7–23)
CALCIUM SERPL-MCNC: 9.8 MG/DL — SIGNIFICANT CHANGE UP (ref 8.4–10.5)
CHLORIDE SERPL-SCNC: 101 MMOL/L — SIGNIFICANT CHANGE UP (ref 96–108)
CO2 SERPL-SCNC: 22 MMOL/L — SIGNIFICANT CHANGE UP (ref 22–31)
CREAT SERPL-MCNC: 0.55 MG/DL — SIGNIFICANT CHANGE UP (ref 0.5–1.3)
GLUCOSE SERPL-MCNC: 156 MG/DL — HIGH (ref 70–99)
MAGNESIUM SERPL-MCNC: 2 MG/DL — SIGNIFICANT CHANGE UP (ref 1.6–2.6)
PHOSPHATE SERPL-MCNC: 2.5 MG/DL — SIGNIFICANT CHANGE UP (ref 2.5–4.5)
POTASSIUM SERPL-MCNC: 3.7 MMOL/L — SIGNIFICANT CHANGE UP (ref 3.5–5.3)
POTASSIUM SERPL-SCNC: 3.7 MMOL/L — SIGNIFICANT CHANGE UP (ref 3.5–5.3)
PROT SERPL-MCNC: 7 G/DL — SIGNIFICANT CHANGE UP (ref 6–8.3)
SODIUM SERPL-SCNC: 139 MMOL/L — SIGNIFICANT CHANGE UP (ref 135–145)

## 2017-09-21 PROCEDURE — 97606 NEG PRS WND THER DME>50 SQCM: CPT

## 2017-09-21 RX ORDER — ELECTROLYTE SOLUTION,INJ
1 VIAL (ML) INTRAVENOUS
Qty: 0 | Refills: 0 | Status: DISCONTINUED | OUTPATIENT
Start: 2017-09-21 | End: 2017-09-21

## 2017-09-21 RX ORDER — ACETAMINOPHEN 500 MG
1000 TABLET ORAL ONCE
Qty: 0 | Refills: 0 | Status: COMPLETED | OUTPATIENT
Start: 2017-09-21 | End: 2017-09-21

## 2017-09-21 RX ORDER — I.V. FAT EMULSION 20 G/100ML
50 EMULSION INTRAVENOUS
Qty: 0 | Refills: 0 | Status: DISCONTINUED | OUTPATIENT
Start: 2017-09-21 | End: 2017-09-21

## 2017-09-21 RX ORDER — HYDROMORPHONE HYDROCHLORIDE 2 MG/ML
1 INJECTION INTRAMUSCULAR; INTRAVENOUS; SUBCUTANEOUS ONCE
Qty: 0 | Refills: 0 | Status: DISCONTINUED | OUTPATIENT
Start: 2017-09-21 | End: 2017-09-21

## 2017-09-21 RX ADMIN — HYDROMORPHONE HYDROCHLORIDE 1 MILLIGRAM(S): 2 INJECTION INTRAMUSCULAR; INTRAVENOUS; SUBCUTANEOUS at 09:56

## 2017-09-21 RX ADMIN — NYSTATIN CREAM 1 APPLICATION(S): 100000 CREAM TOPICAL at 18:09

## 2017-09-21 RX ADMIN — Medication 1 EACH: at 18:08

## 2017-09-21 RX ADMIN — Medication 1000 MILLIGRAM(S): at 16:58

## 2017-09-21 RX ADMIN — PIPERACILLIN AND TAZOBACTAM 200 GRAM(S): 4; .5 INJECTION, POWDER, LYOPHILIZED, FOR SOLUTION INTRAVENOUS at 06:05

## 2017-09-21 RX ADMIN — HYDROMORPHONE HYDROCHLORIDE 1 MILLIGRAM(S): 2 INJECTION INTRAMUSCULAR; INTRAVENOUS; SUBCUTANEOUS at 09:41

## 2017-09-21 RX ADMIN — LOSARTAN POTASSIUM 50 MILLIGRAM(S): 100 TABLET, FILM COATED ORAL at 06:05

## 2017-09-21 RX ADMIN — I.V. FAT EMULSION 20.83 GRAM(S): 20 EMULSION INTRAVENOUS at 21:36

## 2017-09-21 RX ADMIN — NYSTATIN CREAM 1 APPLICATION(S): 100000 CREAM TOPICAL at 06:06

## 2017-09-21 RX ADMIN — ENOXAPARIN SODIUM 30 MILLIGRAM(S): 100 INJECTION SUBCUTANEOUS at 06:05

## 2017-09-21 RX ADMIN — Medication 400 MILLIGRAM(S): at 16:43

## 2017-09-21 RX ADMIN — Medication 50 MILLIGRAM(S): at 21:36

## 2017-09-21 RX ADMIN — ESCITALOPRAM OXALATE 20 MILLIGRAM(S): 10 TABLET, FILM COATED ORAL at 21:36

## 2017-09-21 RX ADMIN — Medication 1 APPLICATION(S): at 12:06

## 2017-09-21 RX ADMIN — ENOXAPARIN SODIUM 30 MILLIGRAM(S): 100 INJECTION SUBCUTANEOUS at 18:08

## 2017-09-21 RX ADMIN — PANTOPRAZOLE SODIUM 40 MILLIGRAM(S): 20 TABLET, DELAYED RELEASE ORAL at 12:05

## 2017-09-21 NOTE — PROGRESS NOTE ADULT - SUBJECTIVE AND OBJECTIVE BOX
O/N: BRAYDEN, VSS  9/20: Wound vac changed , given IV dilaudid before dressing change     Surgeries:  7/24: SBR resection, fistula resection, revision of esophagogegunostomy drain through esophageal hiatus in R mediastinum  7/29: IR drainage of 800 cc of succus  7/29: Ex-lap, SB anastamosis in BP limb breakdown with succus throughout abdomen  8/1: abd washout, drainage of abscess, biologic mesh placement, closure of abdominal wall, vac and retention suture  8/7: abd washout, mesh removal, frozen abd noted, LLQ CHECO replaced with benson drain  8/10: leak noted from previous anastamosis site on L side, mid abdomen malecot drain placed in enterotomy, JPs x 2 placed, necrotic fascia debrided, vicryl mesh sutured to fascia circumferentially, xeroform over mesh then vac dressing placed O/N: BRAYDEN, VSS  9/20: Wound vac changed , given IV dilaudid before dressing change     Surgeries:  7/24: SBR resection, fistula resection, revision of esophagogegunostomy drain through esophageal hiatus in R mediastinum  7/29: IR drainage of 800 cc of succus  7/29: Ex-lap, SB anastamosis in BP limb breakdown with succus throughout abdomen  8/1: abd washout, drainage of abscess, biologic mesh placement, closure of abdominal wall, vac and retention suture  8/7: abd washout, mesh removal, frozen abd noted, LLQ CHECO replaced with benson drain  8/10: leak noted from previous anastamosis site on L side, mid abdomen malecot drain placed in enterotomy, JPs x 2 placed, necrotic fascia debrided, vicryl mesh sutured to fascia circumferentially, xeroform over mesh then vac dressing placed	      SUBJECTIVE: Patient examined bedside. Patient denies any complaints.  Flatus: [X] YES [] NO             Bowel Movement: [X ] YES [ ] NO  Pain (0-10):            Pain Control Adequate: [ X] YES [ ] NO  Nausea: [ ] YES [X ] NO            Vomiting: [ ] YES [X ] NO  Diarrhea: [ ] YES [X ] NO         Constipation: [ ] YES [X ] NO     Chest Pain: [ ] YES [X ] NO    SOB:  [ ] YES [X ] NO    piperacillin/tazobactam IVPB. 4.5 Gram(s) IV Intermittent every 6 hours  losartan 50 milliGRAM(s) Oral daily  heparin  flush 100 Units/mL Injectable 100 Unit(s) IV Push every other day  enoxaparin Injectable 30 milliGRAM(s) SubCutaneous two times a day      Vital Signs Last 24 Hrs  T(C): 36.8 (21 Sep 2017 06:09), Max: 36.8 (20 Sep 2017 14:43)  T(F): 98.3 (21 Sep 2017 06:09), Max: 98.3 (20 Sep 2017 14:43)  HR: 73 (21 Sep 2017 06:09) (73 - 80)  BP: 152/103 (21 Sep 2017 06:09) (122/88 - 164/96)  BP(mean): --  RR: 16 (20 Sep 2017 17:48) (16 - 17)  SpO2: 96% (21 Sep 2017 06:09) (95% - 98%)  I&O's Detail    20 Sep 2017 07:01  -  21 Sep 2017 07:00  --------------------------------------------------------  IN:    Fat Emulsion 20%: 270.4 mL    Oral Fluid: 420 mL    Solution: 400 mL    TPN (Total Parenteral Nutrition): 1472 mL  Total IN: 2562.4 mL    OUT:    VAC (Vacuum Assisted Closure) System: 60 mL    Voided: 900 mL  Total OUT: 960 mL    Total NET: 1602.4 mL          General: NAD, resting comfortably in bed  C/V: NSR  Pulm: Nonlabored breathing, no respiratory distress  Abd: soft, TTP around wound vac site  Extrem: WWP, no edema, SCDs in place  Wound vac functioning   CHECO drain putting out bilious fluid       LABS:    09-21    139  |  101  |  26<H>  ----------------------------<  156<H>  3.7   |  22  |  0.55    Ca    9.8      21 Sep 2017 06:44  Phos  2.4     09-20  Mg     1.9     09-20    TPro  7.0  /  Alb  3.2<L>  /  TBili  0.9  /  DBili  x   /  AST  17  /  ALT  22  /  AlkPhos  189<H>  09-21

## 2017-09-22 LAB
ALBUMIN SERPL ELPH-MCNC: 3.4 G/DL — SIGNIFICANT CHANGE UP (ref 3.3–5)
ALP SERPL-CCNC: 201 U/L — HIGH (ref 40–120)
ALT FLD-CCNC: 23 U/L — SIGNIFICANT CHANGE UP (ref 10–45)
ANION GAP SERPL CALC-SCNC: 16 MMOL/L — SIGNIFICANT CHANGE UP (ref 5–17)
AST SERPL-CCNC: 19 U/L — SIGNIFICANT CHANGE UP (ref 10–40)
BILIRUB SERPL-MCNC: 0.8 MG/DL — SIGNIFICANT CHANGE UP (ref 0.2–1.2)
BUN SERPL-MCNC: 25 MG/DL — HIGH (ref 7–23)
CALCIUM SERPL-MCNC: 9.8 MG/DL — SIGNIFICANT CHANGE UP (ref 8.4–10.5)
CHLORIDE SERPL-SCNC: 100 MMOL/L — SIGNIFICANT CHANGE UP (ref 96–108)
CO2 SERPL-SCNC: 22 MMOL/L — SIGNIFICANT CHANGE UP (ref 22–31)
CREAT SERPL-MCNC: 0.52 MG/DL — SIGNIFICANT CHANGE UP (ref 0.5–1.3)
GLUCOSE SERPL-MCNC: 137 MG/DL — HIGH (ref 70–99)
POTASSIUM SERPL-MCNC: 4 MMOL/L — SIGNIFICANT CHANGE UP (ref 3.5–5.3)
POTASSIUM SERPL-SCNC: 4 MMOL/L — SIGNIFICANT CHANGE UP (ref 3.5–5.3)
PROT SERPL-MCNC: 7.2 G/DL — SIGNIFICANT CHANGE UP (ref 6–8.3)
SODIUM SERPL-SCNC: 138 MMOL/L — SIGNIFICANT CHANGE UP (ref 135–145)

## 2017-09-22 RX ORDER — ACETAMINOPHEN 500 MG
1000 TABLET ORAL ONCE
Qty: 0 | Refills: 0 | Status: COMPLETED | OUTPATIENT
Start: 2017-09-22 | End: 2017-09-22

## 2017-09-22 RX ORDER — ELECTROLYTE SOLUTION,INJ
1 VIAL (ML) INTRAVENOUS
Qty: 0 | Refills: 0 | Status: DISCONTINUED | OUTPATIENT
Start: 2017-09-22 | End: 2017-09-22

## 2017-09-22 RX ORDER — I.V. FAT EMULSION 20 G/100ML
50 EMULSION INTRAVENOUS
Qty: 0 | Refills: 0 | Status: DISCONTINUED | OUTPATIENT
Start: 2017-09-22 | End: 2017-09-22

## 2017-09-22 RX ADMIN — Medication 5 MILLIGRAM(S): at 01:04

## 2017-09-22 RX ADMIN — Medication 1 EACH: at 18:07

## 2017-09-22 RX ADMIN — NYSTATIN CREAM 1 APPLICATION(S): 100000 CREAM TOPICAL at 18:06

## 2017-09-22 RX ADMIN — ENOXAPARIN SODIUM 30 MILLIGRAM(S): 100 INJECTION SUBCUTANEOUS at 18:06

## 2017-09-22 RX ADMIN — Medication 400 MILLIGRAM(S): at 13:10

## 2017-09-22 RX ADMIN — ENOXAPARIN SODIUM 30 MILLIGRAM(S): 100 INJECTION SUBCUTANEOUS at 06:13

## 2017-09-22 RX ADMIN — Medication 50 MILLIGRAM(S): at 22:05

## 2017-09-22 RX ADMIN — LOSARTAN POTASSIUM 50 MILLIGRAM(S): 100 TABLET, FILM COATED ORAL at 06:12

## 2017-09-22 RX ADMIN — Medication 1000 MILLIGRAM(S): at 13:45

## 2017-09-22 RX ADMIN — Medication 5 MILLIGRAM(S): at 22:05

## 2017-09-22 RX ADMIN — CALCITRIOL 1 MICROGRAM(S): 0.5 CAPSULE ORAL at 13:10

## 2017-09-22 RX ADMIN — NYSTATIN CREAM 1 APPLICATION(S): 100000 CREAM TOPICAL at 06:13

## 2017-09-22 RX ADMIN — Medication 1 APPLICATION(S): at 13:10

## 2017-09-22 RX ADMIN — PANTOPRAZOLE SODIUM 40 MILLIGRAM(S): 20 TABLET, DELAYED RELEASE ORAL at 13:10

## 2017-09-22 RX ADMIN — I.V. FAT EMULSION 20.83 GRAM(S): 20 EMULSION INTRAVENOUS at 22:09

## 2017-09-22 RX ADMIN — Medication 100 UNIT(S): at 13:10

## 2017-09-22 RX ADMIN — ESCITALOPRAM OXALATE 20 MILLIGRAM(S): 10 TABLET, FILM COATED ORAL at 22:05

## 2017-09-22 RX ADMIN — Medication 400 MILLIGRAM(S): at 22:06

## 2017-09-22 RX ADMIN — Medication 1000 MILLIGRAM(S): at 23:00

## 2017-09-22 RX ADMIN — Medication 400 MILLIGRAM(S): at 02:00

## 2017-09-22 NOTE — PROGRESS NOTE ADULT - SUBJECTIVE AND OBJECTIVE BOX
O/N: BRAYDEN, VSS  9/21: vac change      STATUS POST:   7/24: SBR resection, fistula resection, revision of esophagogegunostomy drain through esophageal hiatus in R mediastinum  7/29: IR drainage of 800 cc of succus  7/29: Ex-lap, SB anastamosis in BP limb breakdown with succus throughout abdomen  8/1: abd washout, drainage of abscess, biologic mesh placement, closure of abdominal wall, vac and retention suture  8/7: abd washout, mesh removal, frozen abd noted, LLQ CHECO replaced with benson drain  8/10: leak noted from previous anastamosis site on L side, mid abdomen malecot drain placed in enterotomy, JPs x 2 placed, necrotic fascia debrided, vicryl mesh sutured to fascia circumferentially, xeroform over mesh then vac dressing placed O/N: BRAYDEN, VSS  9/21: vac change    SUBJECTIVE: Patient seen and examined bedside by chief resident. Doing well this morning c/o abdominal pain. Pruritis.     losartan 50 milliGRAM(s) Oral daily  heparin  flush 100 Units/mL Injectable 100 Unit(s) IV Push every other day  enoxaparin Injectable 30 milliGRAM(s) SubCutaneous two times a day      Vital Signs Last 24 Hrs  T(C): 36.3 (22 Sep 2017 06:35), Max: 37.1 (21 Sep 2017 20:48)  T(F): 97.4 (22 Sep 2017 06:35), Max: 98.7 (21 Sep 2017 20:48)  HR: 69 (22 Sep 2017 06:35) (69 - 79)  BP: 149/96 (22 Sep 2017 06:35) (132/80 - 153/98)  BP(mean): --  RR: 17 (22 Sep 2017 06:35) (16 - 18)  SpO2: 96% (22 Sep 2017 06:35) (94% - 96%)  I&O's Detail    21 Sep 2017 07:01  -  22 Sep 2017 07:00  --------------------------------------------------------  IN:    Fat Emulsion 20%: 208 mL    Oral Fluid: 360 mL    TPN (Total Parenteral Nutrition): 704 mL  Total IN: 1272 mL    OUT:    VAC (Vacuum Assisted Closure) System: 90 mL    Voided: 2775 mL  Total OUT: 2865 mL    Total NET: -1593 mL          General: NAD, resting comfortably in bed  C/V: NSR  Pulm: Nonlabored breathing, no respiratory distress  Abd: soft, NT/ND. vac in place, suction. 350 draining.   Extrem: WWP, no edema, SCDs in place        LABS:    09-22    138  |  100  |  25<H>  ----------------------------<  137<H>  4.0   |  22  |  0.52    Ca    9.8      22 Sep 2017 07:12  Phos  2.5     09-21  Mg     2.0     09-21    TPro  7.2  /  Alb  3.4  /  TBili  0.8  /  DBili  x   /  AST  19  /  ALT  23  /  AlkPhos  201<H>  09-22          RADIOLOGY & ADDITIONAL STUDIES:      STATUS POST:   7/24: SBR resection, fistula resection, revision of esophagogegunostomy drain through esophageal hiatus in R mediastinum  7/29: IR drainage of 800 cc of succus  7/29: Ex-lap, SB anastamosis in BP limb breakdown with succus throughout abdomen  8/1: abd washout, drainage of abscess, biologic mesh placement, closure of abdominal wall, vac and retention suture  8/7: abd washout, mesh removal, frozen abd noted, LLQ CHECO replaced with benson drain  8/10: leak noted from previous anastamosis site on L side, mid abdomen malecot drain placed in enterotomy, JPs x 2 placed, necrotic fascia debrided, vicryl mesh sutured to fascia circumferentially, xeroform over mesh then vac dressing placed

## 2017-09-22 NOTE — CHART NOTE - NSCHARTNOTEFT_GEN_A_CORE
Admitting Diagnosis:   57 F s/p  SBR resection, fistula resection, esophagojejunostomy revision w/ post-op course complicated by RTOR for distal anastomosis breakdown, ATN, acute respiratory failure, & septic shock.       PAST MEDICAL & SURGICAL HISTORY:  Reflux  Obesity  DVT (deep venous thrombosis): 2013 neck  Diverticulitis  Hypertension  Elective surgery: abodominal wall surgery  dec 2016  Elective surgery: NOV 2016 gastric bypass revision  Gastric bypass status for obesity: gastric sleeve, 6/2012  S/P breast biopsy: 2011, left  S/P colon resection: 2011  S/P knee surgery: repair 2009, 2011  right; left  Umbilical hernia: 2000  S/P appendectomy: 1975  S/P tonsillectomy: 1967      Current Nutrition Order:  TPN via PICC line: 254 g D, 104 g AA, 50 g 20% lipid. Providing pt with 1779 kcal, 104 g protein, 50g fat & 1858 mL fluid. GIR = 1.77    GI Issues: Abdominal pain, nausea reported. -BM, -flatus    Pain: Being managed via pain regimen    Skin Integrity: DTI, stage II pressure injury, wound vac to midline abdomen, malecot in entertotomy site    Labs:   09-22    138  |  100  |  25<H>  ----------------------------<  137<H>  4.0   |  22  |  0.52    Ca    9.8      22 Sep 2017 07:12  Phos  2.5     09-21  Mg     2.0     09-21    TPro  7.2  /  Alb  3.4  /  TBili  0.8  /  DBili  x   /  AST  19  /  ALT  23  /  AlkPhos  201<H>  09-22    CAPILLARY BLOOD GLUCOSE  129 (22 Sep 2017 11:13)  131 (22 Sep 2017 07:42)  128 (21 Sep 2017 20:19)  114 (21 Sep 2017 16:51)          Medications:  MEDICATIONS  (STANDING):  insulin lispro (HumaLOG) corrective regimen sliding scale   SubCutaneous every 6 hours  dextrose 5%. 1000 milliLiter(s) (50 mL/Hr) IV Continuous <Continuous>  dextrose 50% Injectable 25 Gram(s) IV Push once  sodium chloride 0.9% lock flush 20 milliLiter(s) IV Push once  cyanocobalamin Injectable 1000 MICROGram(s) SubCutaneous every 7 days  dextrose 50% Injectable 12.5 Gram(s) IV Push once  nystatin Powder 1 Application(s) Topical two times a day  pantoprazole  Injectable 40 milliGRAM(s) IV Push daily  calcitriol Injectable 1 MICROGram(s) IV Push <User Schedule>  collagenase Ointment 1 Application(s) Topical daily  escitalopram 20 milliGRAM(s) Oral daily  losartan 50 milliGRAM(s) Oral daily  heparin  flush 100 Units/mL Injectable 100 Unit(s) IV Push every other day  enoxaparin Injectable 30 milliGRAM(s) SubCutaneous two times a day  diphenhydrAMINE   Injectable 50 milliGRAM(s) IV Push at bedtime  diazepam    Tablet 5 milliGRAM(s) Oral at bedtime  fat emulsion 20% Infusion 50 Gram(s) (20.83 mL/Hr) IV Continuous <Continuous>  Parenteral Nutrition - Adult 1 Each (64 mL/Hr) TPN Continuous <Continuous>  fat emulsion 20% Infusion 50 Gram(s) (20.83 mL/Hr) IV Continuous <Continuous>  Parenteral Nutrition - Adult 1 Each (64 mL/Hr) TPN Continuous <Continuous>    MEDICATIONS  (PRN):  sodium chloride 0.9% lock flush 10 milliLiter(s) IV Push every 1 hour PRN After each medication administration  sodium chloride 0.9% lock flush 10 milliLiter(s) IV Push every 12 hours PRN Lumen of catheter NOT used      Weight: 99.5kg   Daily     Daily     Weight Change: No new weight since Aug 1st. Need to obtain new actual weight.     Estimated energy needs: remain unchanged from previous follow ups  Estimated energy needs using 52 kg IBW:  30-35 kcal/kg (4684-4334 kcal).   1.8-2 g/kg ( g protein).  30-35 mL/kg (6519-6340 mL fluid).     Subjective: Pt remains on TPN via PICC line. NPO. 50g lipids provided today;  WNL. , trending down. Malecot in enterotomy site, wound vac to midline, DTI, and stage II pressure injury. TPN providing 100% estimated needs. Pt reports pain and nausea, no BM, no flatus. Complaining that she would like to have broth/liquids though explained to patient the need for bowel function to resume before providing PO diet.     Previous Nutrition Diagnosis: Increased nutrient needs RT fistula & s/p surgery AEB increased kcal/protein needs.     Active [  X]  Resolved [   ]    Goal: PT to meet >75% EER via tolerated route    Recommendations:   1. Please take actual weight for trending purposes  2. Continue with current TPN order  3. PO diet per MD discretion   4. Continue to check labs: BG, Phos, Mg, K, Na, TG    Education:     Risk Level: High [ X  ] Moderate [   ] Low [   ]

## 2017-09-23 LAB
ANION GAP SERPL CALC-SCNC: 13 MMOL/L — SIGNIFICANT CHANGE UP (ref 5–17)
BUN SERPL-MCNC: 27 MG/DL — HIGH (ref 7–23)
CALCIUM SERPL-MCNC: 10.2 MG/DL — SIGNIFICANT CHANGE UP (ref 8.4–10.5)
CHLORIDE SERPL-SCNC: 101 MMOL/L — SIGNIFICANT CHANGE UP (ref 96–108)
CO2 SERPL-SCNC: 24 MMOL/L — SIGNIFICANT CHANGE UP (ref 22–31)
CREAT SERPL-MCNC: 0.56 MG/DL — SIGNIFICANT CHANGE UP (ref 0.5–1.3)
GLUCOSE SERPL-MCNC: 142 MG/DL — HIGH (ref 70–99)
MAGNESIUM SERPL-MCNC: 2 MG/DL — SIGNIFICANT CHANGE UP (ref 1.6–2.6)
PHOSPHATE SERPL-MCNC: 3.3 MG/DL — SIGNIFICANT CHANGE UP (ref 2.5–4.5)
POTASSIUM SERPL-MCNC: 4.3 MMOL/L — SIGNIFICANT CHANGE UP (ref 3.5–5.3)
POTASSIUM SERPL-SCNC: 4.3 MMOL/L — SIGNIFICANT CHANGE UP (ref 3.5–5.3)
SODIUM SERPL-SCNC: 138 MMOL/L — SIGNIFICANT CHANGE UP (ref 135–145)

## 2017-09-23 PROCEDURE — 97606 NEG PRS WND THER DME>50 SQCM: CPT

## 2017-09-23 RX ORDER — HYDROMORPHONE HYDROCHLORIDE 2 MG/ML
0.5 INJECTION INTRAMUSCULAR; INTRAVENOUS; SUBCUTANEOUS ONCE
Qty: 0 | Refills: 0 | Status: DISCONTINUED | OUTPATIENT
Start: 2017-09-23 | End: 2017-09-23

## 2017-09-23 RX ORDER — ACETAMINOPHEN 500 MG
1000 TABLET ORAL ONCE
Qty: 0 | Refills: 0 | Status: COMPLETED | OUTPATIENT
Start: 2017-09-23 | End: 2017-09-23

## 2017-09-23 RX ORDER — ELECTROLYTE SOLUTION,INJ
1 VIAL (ML) INTRAVENOUS
Qty: 0 | Refills: 0 | Status: DISCONTINUED | OUTPATIENT
Start: 2017-09-23 | End: 2017-09-23

## 2017-09-23 RX ADMIN — HYDROMORPHONE HYDROCHLORIDE 0.5 MILLIGRAM(S): 2 INJECTION INTRAMUSCULAR; INTRAVENOUS; SUBCUTANEOUS at 15:20

## 2017-09-23 RX ADMIN — ENOXAPARIN SODIUM 30 MILLIGRAM(S): 100 INJECTION SUBCUTANEOUS at 18:34

## 2017-09-23 RX ADMIN — NYSTATIN CREAM 1 APPLICATION(S): 100000 CREAM TOPICAL at 06:29

## 2017-09-23 RX ADMIN — Medication 50 MILLIGRAM(S): at 21:28

## 2017-09-23 RX ADMIN — LOSARTAN POTASSIUM 50 MILLIGRAM(S): 100 TABLET, FILM COATED ORAL at 06:24

## 2017-09-23 RX ADMIN — Medication 400 MILLIGRAM(S): at 21:28

## 2017-09-23 RX ADMIN — Medication 5 MILLIGRAM(S): at 21:28

## 2017-09-23 RX ADMIN — ENOXAPARIN SODIUM 30 MILLIGRAM(S): 100 INJECTION SUBCUTANEOUS at 06:25

## 2017-09-23 RX ADMIN — NYSTATIN CREAM 1 APPLICATION(S): 100000 CREAM TOPICAL at 18:34

## 2017-09-23 RX ADMIN — HYDROMORPHONE HYDROCHLORIDE 0.5 MILLIGRAM(S): 2 INJECTION INTRAMUSCULAR; INTRAVENOUS; SUBCUTANEOUS at 15:09

## 2017-09-23 RX ADMIN — Medication 400 MILLIGRAM(S): at 12:30

## 2017-09-23 RX ADMIN — PANTOPRAZOLE SODIUM 40 MILLIGRAM(S): 20 TABLET, DELAYED RELEASE ORAL at 11:17

## 2017-09-23 RX ADMIN — Medication 1000 MILLIGRAM(S): at 22:00

## 2017-09-23 RX ADMIN — Medication 1000 MILLIGRAM(S): at 13:00

## 2017-09-23 RX ADMIN — ESCITALOPRAM OXALATE 20 MILLIGRAM(S): 10 TABLET, FILM COATED ORAL at 21:28

## 2017-09-23 RX ADMIN — Medication 1 EACH: at 18:34

## 2017-09-23 RX ADMIN — Medication 1 APPLICATION(S): at 18:35

## 2017-09-23 NOTE — PROGRESS NOTE ADULT - SUBJECTIVE AND OBJECTIVE BOX
O/N: BRAYDEN, VSS  9/22: vac in place    STATUS POST:   7/24: SBR resection, fistula resection, revision of esophagogegunostomy drain through esophageal hiatus in R mediastinum  7/29: IR drainage of 800 cc of succus  7/29: Ex-lap, SB anastamosis in BP limb breakdown with succus throughout abdomen  8/1: abd washout, drainage of abscess, biologic mesh placement, closure of abdominal wall, vac and retention suture  8/7: abd washout, mesh removal, frozen abd noted, LLQ CHECO replaced with benson drain  8/10: leak noted from previous anastamosis site on L side, mid abdomen malecot drain placed in enterotomy, JPs x 2 placed, necrotic fascia debrided, vicryl mesh sutured to fascia circumferentially, xeroform over mesh then vac dressing placed O/N: BRAYDEN, VSS  9/22: vac in place    STATUS POST:   7/24: SBR resection, fistula resection, revision of esophagogegunostomy drain through esophageal hiatus in R mediastinum  7/29: IR drainage of 800 cc of succus  7/29: Ex-lap, SB anastamosis in BP limb breakdown with succus throughout abdomen  8/1: abd washout, drainage of abscess, biologic mesh placement, closure of abdominal wall, vac and retention suture  8/7: abd washout, mesh removal, frozen abd noted, LLQ CHECO replaced with benson drain  8/10: leak noted from previous anastamosis site on L side, mid abdomen malecot drain placed in enterotomy, JPs x 2 placed, necrotic fascia debrided, vicryl mesh sutured to fascia circumferentially, xeroform over mesh then vac dressing placed	      SUBJECTIVE: Patient seen and examined bedside by chief resident. No acute events o/n.    Vital Signs Last 24 Hrs  T(C): 36.6 (23 Sep 2017 13:26), Max: 36.8 (22 Sep 2017 22:00)  T(F): 97.9 (23 Sep 2017 13:26), Max: 98.2 (22 Sep 2017 22:00)  HR: 92 (23 Sep 2017 13:26) (70 - 92)  BP: 142/85 (23 Sep 2017 13:26) (134/78 - 160/98)  BP(mean): --  RR: 17 (23 Sep 2017 13:26) (17 - 18)  SpO2: 100% (23 Sep 2017 13:26) (94% - 100%)  I&O's Detail    22 Sep 2017 07:01  -  23 Sep 2017 07:00  --------------------------------------------------------  IN:    Fat Emulsion 20%: 208.3 mL    TPN (Total Parenteral Nutrition): 640 mL  Total IN: 848.3 mL    OUT:    Drain: 300 mL    VAC (Vacuum Assisted Closure) System: 110 mL    Voided: 450 mL  Total OUT: 860 mL    Total NET: -11.7 mL          General: NAD, resting comfortably in bed  C/V: NSR  Pulm: Nonlabored breathing, no respiratory distress  Abd: soft, NT/ND.  Extrem: WWP, no edema, SCDs in place        LABS:    09-23    138  |  101  |  27<H>  ----------------------------<  142<H>  4.3   |  24  |  0.56    Ca    10.2      23 Sep 2017 07:57  Phos  3.3     09-23  Mg     2.0     09-23    TPro  7.2  /  Alb  3.4  /  TBili  0.8  /  DBili  x   /  AST  19  /  ALT  23  /  AlkPhos  201<H>  09-22          RADIOLOGY & ADDITIONAL STUDIES: O/N: BRAYDEN, VSS  9/22: vac in place    STATUS POST:   7/24: SBR resection, fistula resection, revision of esophagogegunostomy drain through esophageal hiatus in R mediastinum  7/29: IR drainage of 800 cc of succus  7/29: Ex-lap, SB anastamosis in BP limb breakdown with succus throughout abdomen  8/1: abd washout, drainage of abscess, biologic mesh placement, closure of abdominal wall, vac and retention suture  8/7: abd washout, mesh removal, frozen abd noted, LLQ CHECO replaced with benson drain  8/10: leak noted from previous anastamosis site on L side, mid abdomen malecot drain placed in enterotomy, JPs x 2 placed, necrotic fascia debrided, vicryl mesh sutured to fascia circumferentially, xeroform over mesh then vac dressing placed	      SUBJECTIVE: Patient seen and examined bedside by chief resident. No acute events o/n. Denies f/c/n/v. Wound vac replaced today.    Vital Signs Last 24 Hrs  T(C): 36.6 (23 Sep 2017 13:26), Max: 36.8 (22 Sep 2017 22:00)  T(F): 97.9 (23 Sep 2017 13:26), Max: 98.2 (22 Sep 2017 22:00)  HR: 92 (23 Sep 2017 13:26) (70 - 92)  BP: 142/85 (23 Sep 2017 13:26) (134/78 - 160/98)  BP(mean): --  RR: 17 (23 Sep 2017 13:26) (17 - 18)  SpO2: 100% (23 Sep 2017 13:26) (94% - 100%)  I&O's Detail    22 Sep 2017 07:01  -  23 Sep 2017 07:00  --------------------------------------------------------  IN:    Fat Emulsion 20%: 208.3 mL    TPN (Total Parenteral Nutrition): 640 mL  Total IN: 848.3 mL    OUT:    Drain: 300 mL    VAC (Vacuum Assisted Closure) System: 110 mL    Voided: 450 mL  Total OUT: 860 mL    Total NET: -11.7 mL          General: NAD, resting comfortably in bed  C/V: NSR  Pulm: Nonlabored breathing, no respiratory distress  Abd: soft, NT/ND.  Extrem: WWP, no edema, SCDs in place        LABS:    09-23    138  |  101  |  27<H>  ----------------------------<  142<H>  4.3   |  24  |  0.56    Ca    10.2      23 Sep 2017 07:57  Phos  3.3     09-23  Mg     2.0     09-23    TPro  7.2  /  Alb  3.4  /  TBili  0.8  /  DBili  x   /  AST  19  /  ALT  23  /  AlkPhos  201<H>  09-22          RADIOLOGY & ADDITIONAL STUDIES:

## 2017-09-23 NOTE — PROGRESS NOTE ADULT - ASSESSMENT
57 F s/p  SBR, fistula resection, esophagojejunostomy revision w/ post-op course complicated by RTOR for distal anastomosis breakdown, ATN, acute respiratory failure, & septic shock.     NPO/TPN/IVF  Pain/nausea control  ISS  Zosyn  L IJ  Nystatin powder  SCDs, SQH, OOBA  Drains: Malecot in enterotomy site, wound vac to midline  Wounds: Midline xip

## 2017-09-24 LAB
ANION GAP SERPL CALC-SCNC: 12 MMOL/L — SIGNIFICANT CHANGE UP (ref 5–17)
BUN SERPL-MCNC: 31 MG/DL — HIGH (ref 7–23)
CALCIUM SERPL-MCNC: 10.1 MG/DL — SIGNIFICANT CHANGE UP (ref 8.4–10.5)
CHLORIDE SERPL-SCNC: 102 MMOL/L — SIGNIFICANT CHANGE UP (ref 96–108)
CO2 SERPL-SCNC: 23 MMOL/L — SIGNIFICANT CHANGE UP (ref 22–31)
CREAT SERPL-MCNC: 0.56 MG/DL — SIGNIFICANT CHANGE UP (ref 0.5–1.3)
GLUCOSE SERPL-MCNC: 109 MG/DL — HIGH (ref 70–99)
HCT VFR BLD CALC: 37.6 % — SIGNIFICANT CHANGE UP (ref 34.5–45)
HGB BLD-MCNC: 11.8 G/DL — SIGNIFICANT CHANGE UP (ref 11.5–15.5)
MAGNESIUM SERPL-MCNC: 2 MG/DL — SIGNIFICANT CHANGE UP (ref 1.6–2.6)
MCHC RBC-ENTMCNC: 29.8 PG — SIGNIFICANT CHANGE UP (ref 27–34)
MCHC RBC-ENTMCNC: 31.4 G/DL — LOW (ref 32–36)
MCV RBC AUTO: 94.9 FL — SIGNIFICANT CHANGE UP (ref 80–100)
PHOSPHATE SERPL-MCNC: 3.2 MG/DL — SIGNIFICANT CHANGE UP (ref 2.5–4.5)
PLATELET # BLD AUTO: 293 K/UL — SIGNIFICANT CHANGE UP (ref 150–400)
POTASSIUM SERPL-MCNC: 4.3 MMOL/L — SIGNIFICANT CHANGE UP (ref 3.5–5.3)
POTASSIUM SERPL-SCNC: 4.3 MMOL/L — SIGNIFICANT CHANGE UP (ref 3.5–5.3)
RBC # BLD: 3.96 M/UL — SIGNIFICANT CHANGE UP (ref 3.8–5.2)
RBC # FLD: 18.6 % — HIGH (ref 10.3–16.9)
SODIUM SERPL-SCNC: 137 MMOL/L — SIGNIFICANT CHANGE UP (ref 135–145)
WBC # BLD: 9.7 K/UL — SIGNIFICANT CHANGE UP (ref 3.8–10.5)
WBC # FLD AUTO: 9.7 K/UL — SIGNIFICANT CHANGE UP (ref 3.8–10.5)

## 2017-09-24 PROCEDURE — 97606 NEG PRS WND THER DME>50 SQCM: CPT

## 2017-09-24 RX ORDER — ACETAMINOPHEN 500 MG
1000 TABLET ORAL ONCE
Qty: 0 | Refills: 0 | Status: COMPLETED | OUTPATIENT
Start: 2017-09-24 | End: 2017-09-24

## 2017-09-24 RX ORDER — HYDROMORPHONE HYDROCHLORIDE 2 MG/ML
1 INJECTION INTRAMUSCULAR; INTRAVENOUS; SUBCUTANEOUS ONCE
Qty: 0 | Refills: 0 | Status: DISCONTINUED | OUTPATIENT
Start: 2017-09-24 | End: 2017-09-24

## 2017-09-24 RX ORDER — ACETAMINOPHEN 500 MG
1000 TABLET ORAL ONCE
Qty: 0 | Refills: 0 | Status: COMPLETED | OUTPATIENT
Start: 2017-09-25 | End: 2017-09-25

## 2017-09-24 RX ORDER — ELECTROLYTE SOLUTION,INJ
1 VIAL (ML) INTRAVENOUS
Qty: 0 | Refills: 0 | Status: DISCONTINUED | OUTPATIENT
Start: 2017-09-24 | End: 2017-09-24

## 2017-09-24 RX ADMIN — Medication 1 APPLICATION(S): at 13:16

## 2017-09-24 RX ADMIN — NYSTATIN CREAM 1 APPLICATION(S): 100000 CREAM TOPICAL at 05:18

## 2017-09-24 RX ADMIN — ENOXAPARIN SODIUM 30 MILLIGRAM(S): 100 INJECTION SUBCUTANEOUS at 05:19

## 2017-09-24 RX ADMIN — Medication 1 EACH: at 17:35

## 2017-09-24 RX ADMIN — Medication 100 UNIT(S): at 13:15

## 2017-09-24 RX ADMIN — LOSARTAN POTASSIUM 50 MILLIGRAM(S): 100 TABLET, FILM COATED ORAL at 05:17

## 2017-09-24 RX ADMIN — HYDROMORPHONE HYDROCHLORIDE 1 MILLIGRAM(S): 2 INJECTION INTRAMUSCULAR; INTRAVENOUS; SUBCUTANEOUS at 15:18

## 2017-09-24 RX ADMIN — Medication 50 MILLIGRAM(S): at 21:01

## 2017-09-24 RX ADMIN — HYDROMORPHONE HYDROCHLORIDE 1 MILLIGRAM(S): 2 INJECTION INTRAMUSCULAR; INTRAVENOUS; SUBCUTANEOUS at 16:00

## 2017-09-24 RX ADMIN — Medication 1000 MILLIGRAM(S): at 19:00

## 2017-09-24 RX ADMIN — ESCITALOPRAM OXALATE 20 MILLIGRAM(S): 10 TABLET, FILM COATED ORAL at 21:01

## 2017-09-24 RX ADMIN — Medication 5 MILLIGRAM(S): at 21:00

## 2017-09-24 RX ADMIN — Medication 1000 MILLIGRAM(S): at 06:00

## 2017-09-24 RX ADMIN — ENOXAPARIN SODIUM 30 MILLIGRAM(S): 100 INJECTION SUBCUTANEOUS at 17:35

## 2017-09-24 RX ADMIN — NYSTATIN CREAM 1 APPLICATION(S): 100000 CREAM TOPICAL at 17:35

## 2017-09-24 RX ADMIN — Medication 400 MILLIGRAM(S): at 05:17

## 2017-09-24 RX ADMIN — Medication 400 MILLIGRAM(S): at 18:28

## 2017-09-24 RX ADMIN — PANTOPRAZOLE SODIUM 40 MILLIGRAM(S): 20 TABLET, DELAYED RELEASE ORAL at 13:15

## 2017-09-24 NOTE — PROGRESS NOTE ADULT - SUBJECTIVE AND OBJECTIVE BOX
9/24: Vac changed. BRAYDEN  O/N: Vac is leaking     SUBJECTIVE: Patient seen and examined bedside by chief resident.    losartan 50 milliGRAM(s) Oral daily  heparin  flush 100 Units/mL Injectable 100 Unit(s) IV Push every other day  enoxaparin Injectable 30 milliGRAM(s) SubCutaneous two times a day      Vital Signs Last 24 Hrs  T(C): 37.2 (24 Sep 2017 15:28), Max: 37.2 (24 Sep 2017 15:28)  T(F): 99 (24 Sep 2017 15:28), Max: 99 (24 Sep 2017 15:28)  HR: 92 (24 Sep 2017 15:28) (78 - 92)  BP: 138/97 (24 Sep 2017 15:28) (124/82 - 162/98)  BP(mean): --  RR: 17 (24 Sep 2017 15:28) (16 - 17)  SpO2: 95% (24 Sep 2017 15:28) (95% - 97%)  I&O's Detail    23 Sep 2017 07:01  -  24 Sep 2017 07:00  --------------------------------------------------------  IN:    Solution: 200 mL    TPN (Total Parenteral Nutrition): 768 mL  Total IN: 968 mL    OUT:    VAC (Vacuum Assisted Closure) System: 15 mL    Voided: 850 mL  Total OUT: 865 mL    Total NET: 103 mL          General: NAD, resting comfortably in bed  C/V: NSR  Pulm: Nonlabored breathing, no respiratory distress  Abd: soft, NT/ND. Vac in place  Extrem: WWP, no edema, SCDs in place        LABS:                        11.8   9.7   )-----------( 293      ( 24 Sep 2017 06:54 )             37.6     09-24    137  |  102  |  31<H>  ----------------------------<  109<H>  4.3   |  23  |  0.56    Ca    10.1      24 Sep 2017 06:54  Phos  3.2     09-24  Mg     2.0     09-24            RADIOLOGY & ADDITIONAL STUDIES:

## 2017-09-25 LAB
ANION GAP SERPL CALC-SCNC: 13 MMOL/L — SIGNIFICANT CHANGE UP (ref 5–17)
BUN SERPL-MCNC: 34 MG/DL — HIGH (ref 7–23)
CALCIUM SERPL-MCNC: 10 MG/DL — SIGNIFICANT CHANGE UP (ref 8.4–10.5)
CHLORIDE SERPL-SCNC: 102 MMOL/L — SIGNIFICANT CHANGE UP (ref 96–108)
CO2 SERPL-SCNC: 24 MMOL/L — SIGNIFICANT CHANGE UP (ref 22–31)
CREAT SERPL-MCNC: 0.5 MG/DL — SIGNIFICANT CHANGE UP (ref 0.5–1.3)
GLUCOSE SERPL-MCNC: 156 MG/DL — HIGH (ref 70–99)
HCT VFR BLD CALC: 37.6 % — SIGNIFICANT CHANGE UP (ref 34.5–45)
HGB BLD-MCNC: 11.5 G/DL — SIGNIFICANT CHANGE UP (ref 11.5–15.5)
MAGNESIUM SERPL-MCNC: 2.2 MG/DL — SIGNIFICANT CHANGE UP (ref 1.6–2.6)
MCHC RBC-ENTMCNC: 29.2 PG — SIGNIFICANT CHANGE UP (ref 27–34)
MCHC RBC-ENTMCNC: 30.6 G/DL — LOW (ref 32–36)
MCV RBC AUTO: 95.4 FL — SIGNIFICANT CHANGE UP (ref 80–100)
PHOSPHATE SERPL-MCNC: 3.2 MG/DL — SIGNIFICANT CHANGE UP (ref 2.5–4.5)
PLATELET # BLD AUTO: 281 K/UL — SIGNIFICANT CHANGE UP (ref 150–400)
POTASSIUM SERPL-MCNC: 4.2 MMOL/L — SIGNIFICANT CHANGE UP (ref 3.5–5.3)
POTASSIUM SERPL-SCNC: 4.2 MMOL/L — SIGNIFICANT CHANGE UP (ref 3.5–5.3)
RBC # BLD: 3.94 M/UL — SIGNIFICANT CHANGE UP (ref 3.8–5.2)
RBC # FLD: 18.7 % — HIGH (ref 10.3–16.9)
SODIUM SERPL-SCNC: 139 MMOL/L — SIGNIFICANT CHANGE UP (ref 135–145)
WBC # BLD: 8.4 K/UL — SIGNIFICANT CHANGE UP (ref 3.8–10.5)
WBC # FLD AUTO: 8.4 K/UL — SIGNIFICANT CHANGE UP (ref 3.8–10.5)

## 2017-09-25 RX ORDER — ELECTROLYTE SOLUTION,INJ
1 VIAL (ML) INTRAVENOUS
Qty: 0 | Refills: 0 | Status: DISCONTINUED | OUTPATIENT
Start: 2017-09-25 | End: 2017-09-25

## 2017-09-25 RX ORDER — HYDROMORPHONE HYDROCHLORIDE 2 MG/ML
0.5 INJECTION INTRAMUSCULAR; INTRAVENOUS; SUBCUTANEOUS ONCE
Qty: 0 | Refills: 0 | Status: DISCONTINUED | OUTPATIENT
Start: 2017-09-25 | End: 2017-09-25

## 2017-09-25 RX ORDER — ACETAMINOPHEN 500 MG
1000 TABLET ORAL ONCE
Qty: 0 | Refills: 0 | Status: COMPLETED | OUTPATIENT
Start: 2017-09-25 | End: 2017-09-25

## 2017-09-25 RX ORDER — I.V. FAT EMULSION 20 G/100ML
50 EMULSION INTRAVENOUS
Qty: 0 | Refills: 0 | Status: DISCONTINUED | OUTPATIENT
Start: 2017-09-25 | End: 2017-09-26

## 2017-09-25 RX ADMIN — Medication 1 EACH: at 17:37

## 2017-09-25 RX ADMIN — NYSTATIN CREAM 1 APPLICATION(S): 100000 CREAM TOPICAL at 05:54

## 2017-09-25 RX ADMIN — Medication 1 APPLICATION(S): at 12:50

## 2017-09-25 RX ADMIN — HYDROMORPHONE HYDROCHLORIDE 0.5 MILLIGRAM(S): 2 INJECTION INTRAMUSCULAR; INTRAVENOUS; SUBCUTANEOUS at 19:18

## 2017-09-25 RX ADMIN — Medication 400 MILLIGRAM(S): at 00:38

## 2017-09-25 RX ADMIN — Medication 400 MILLIGRAM(S): at 10:48

## 2017-09-25 RX ADMIN — Medication 5 MILLIGRAM(S): at 21:27

## 2017-09-25 RX ADMIN — NYSTATIN CREAM 1 APPLICATION(S): 100000 CREAM TOPICAL at 17:42

## 2017-09-25 RX ADMIN — ESCITALOPRAM OXALATE 20 MILLIGRAM(S): 10 TABLET, FILM COATED ORAL at 21:27

## 2017-09-25 RX ADMIN — Medication 1000 MILLIGRAM(S): at 01:15

## 2017-09-25 RX ADMIN — Medication 50 MILLIGRAM(S): at 21:27

## 2017-09-25 RX ADMIN — PANTOPRAZOLE SODIUM 40 MILLIGRAM(S): 20 TABLET, DELAYED RELEASE ORAL at 12:45

## 2017-09-25 RX ADMIN — LOSARTAN POTASSIUM 50 MILLIGRAM(S): 100 TABLET, FILM COATED ORAL at 05:53

## 2017-09-25 RX ADMIN — ENOXAPARIN SODIUM 30 MILLIGRAM(S): 100 INJECTION SUBCUTANEOUS at 17:38

## 2017-09-25 RX ADMIN — CALCITRIOL 1 MICROGRAM(S): 0.5 CAPSULE ORAL at 12:45

## 2017-09-25 RX ADMIN — Medication 1000 MILLIGRAM(S): at 11:25

## 2017-09-25 RX ADMIN — HYDROMORPHONE HYDROCHLORIDE 0.5 MILLIGRAM(S): 2 INJECTION INTRAMUSCULAR; INTRAVENOUS; SUBCUTANEOUS at 17:37

## 2017-09-25 RX ADMIN — ENOXAPARIN SODIUM 30 MILLIGRAM(S): 100 INJECTION SUBCUTANEOUS at 05:53

## 2017-09-25 NOTE — PROGRESS NOTE ADULT - SUBJECTIVE AND OBJECTIVE BOX
O/N: VSS, BRAYDEN  9/24: Vac changed. BRAYDEN O/N: VSS, BRAYDEN  9/24: Vac changed. BRAYDEN    STATUS POST:       SUBJECTIVE: Patient seen and examined bedside by chief resident. Doing well, still complaining of pruritis.     losartan 50 milliGRAM(s) Oral daily  heparin  flush 100 Units/mL Injectable 100 Unit(s) IV Push every other day  enoxaparin Injectable 30 milliGRAM(s) SubCutaneous two times a day      Vital Signs Last 24 Hrs  T(C): 36.1 (25 Sep 2017 09:18), Max: 37.2 (24 Sep 2017 15:28)  T(F): 97 (25 Sep 2017 09:18), Max: 99 (24 Sep 2017 15:28)  HR: 89 (25 Sep 2017 09:18) (78 - 99)  BP: 153/91 (25 Sep 2017 09:18) (105/74 - 153/91)  BP(mean): --  RR: 16 (25 Sep 2017 09:18) (16 - 17)  SpO2: 95% (25 Sep 2017 09:18) (95% - 96%)  I&O's Detail    24 Sep 2017 07:01  -  25 Sep 2017 07:00  --------------------------------------------------------  IN:    Solution: 100 mL    TPN (Total Parenteral Nutrition): 1536 mL  Total IN: 1636 mL    OUT:    Colostomy: 70 mL    VAC (Vacuum Assisted Closure) System: 380 mL    Voided: 1550 mL  Total OUT: 2000 mL    Total NET: -364 mL      25 Sep 2017 07:01  -  25 Sep 2017 11:53  --------------------------------------------------------  IN:    Solution: 100 mL  Total IN: 100 mL    OUT:  Total OUT: 0 mL    Total NET: 100 mL        General: NAD, resting comfortably in bed  C/V: NSR  Pulm: Nonlabored breathing, no respiratory distress  Abd: soft, NT/ND. Vac in place, no appearance of leakage.   Extrem: WWP, no edema, SCDs in place        LABS:                        11.5   8.4   )-----------( 281      ( 25 Sep 2017 06:37 )             37.6     09-25    139  |  102  |  34<H>  ----------------------------<  156<H>  4.2   |  24  |  0.50    Ca    10.0      25 Sep 2017 06:37  Phos  3.2     09-25  Mg     2.2     09-25            RADIOLOGY & ADDITIONAL STUDIES:

## 2017-09-26 LAB
ANION GAP SERPL CALC-SCNC: 13 MMOL/L — SIGNIFICANT CHANGE UP (ref 5–17)
BUN SERPL-MCNC: 28 MG/DL — HIGH (ref 7–23)
CALCIUM SERPL-MCNC: 9.8 MG/DL — SIGNIFICANT CHANGE UP (ref 8.4–10.5)
CHLORIDE SERPL-SCNC: 102 MMOL/L — SIGNIFICANT CHANGE UP (ref 96–108)
CO2 SERPL-SCNC: 23 MMOL/L — SIGNIFICANT CHANGE UP (ref 22–31)
CREAT SERPL-MCNC: 0.47 MG/DL — LOW (ref 0.5–1.3)
GLUCOSE SERPL-MCNC: 149 MG/DL — HIGH (ref 70–99)
HCT VFR BLD CALC: 36.3 % — SIGNIFICANT CHANGE UP (ref 34.5–45)
HGB BLD-MCNC: 11.5 G/DL — SIGNIFICANT CHANGE UP (ref 11.5–15.5)
MAGNESIUM SERPL-MCNC: 2 MG/DL — SIGNIFICANT CHANGE UP (ref 1.6–2.6)
MCHC RBC-ENTMCNC: 30.3 PG — SIGNIFICANT CHANGE UP (ref 27–34)
MCHC RBC-ENTMCNC: 31.7 G/DL — LOW (ref 32–36)
MCV RBC AUTO: 95.5 FL — SIGNIFICANT CHANGE UP (ref 80–100)
PHOSPHATE SERPL-MCNC: 3.1 MG/DL — SIGNIFICANT CHANGE UP (ref 2.5–4.5)
PLATELET # BLD AUTO: 284 K/UL — SIGNIFICANT CHANGE UP (ref 150–400)
POTASSIUM SERPL-MCNC: 4 MMOL/L — SIGNIFICANT CHANGE UP (ref 3.5–5.3)
POTASSIUM SERPL-SCNC: 4 MMOL/L — SIGNIFICANT CHANGE UP (ref 3.5–5.3)
RBC # BLD: 3.8 M/UL — SIGNIFICANT CHANGE UP (ref 3.8–5.2)
RBC # FLD: 18.4 % — HIGH (ref 10.3–16.9)
SODIUM SERPL-SCNC: 138 MMOL/L — SIGNIFICANT CHANGE UP (ref 135–145)
WBC # BLD: 9.1 K/UL — SIGNIFICANT CHANGE UP (ref 3.8–10.5)
WBC # FLD AUTO: 9.1 K/UL — SIGNIFICANT CHANGE UP (ref 3.8–10.5)

## 2017-09-26 RX ORDER — ACETAMINOPHEN 500 MG
1000 TABLET ORAL ONCE
Qty: 0 | Refills: 0 | Status: COMPLETED | OUTPATIENT
Start: 2017-09-26 | End: 2017-09-26

## 2017-09-26 RX ORDER — DIAZEPAM 5 MG
5 TABLET ORAL AT BEDTIME
Qty: 0 | Refills: 0 | Status: DISCONTINUED | OUTPATIENT
Start: 2017-09-26 | End: 2017-10-03

## 2017-09-26 RX ORDER — ENOXAPARIN SODIUM 100 MG/ML
30 INJECTION SUBCUTANEOUS
Qty: 0 | Refills: 0 | Status: DISCONTINUED | OUTPATIENT
Start: 2017-09-26 | End: 2017-12-04

## 2017-09-26 RX ORDER — ELECTROLYTE SOLUTION,INJ
1 VIAL (ML) INTRAVENOUS
Qty: 0 | Refills: 0 | Status: DISCONTINUED | OUTPATIENT
Start: 2017-09-26 | End: 2017-09-26

## 2017-09-26 RX ORDER — ACETAMINOPHEN 500 MG
500 TABLET ORAL ONCE
Qty: 0 | Refills: 0 | Status: DISCONTINUED | OUTPATIENT
Start: 2017-09-26 | End: 2017-09-26

## 2017-09-26 RX ORDER — NYSTATIN CREAM 100000 [USP'U]/G
1 CREAM TOPICAL
Qty: 0 | Refills: 0 | Status: DISCONTINUED | OUTPATIENT
Start: 2017-09-26 | End: 2017-12-27

## 2017-09-26 RX ORDER — HYDROMORPHONE HYDROCHLORIDE 2 MG/ML
0.5 INJECTION INTRAMUSCULAR; INTRAVENOUS; SUBCUTANEOUS ONCE
Qty: 0 | Refills: 0 | Status: DISCONTINUED | OUTPATIENT
Start: 2017-09-26 | End: 2017-09-26

## 2017-09-26 RX ADMIN — Medication 400 MILLIGRAM(S): at 21:45

## 2017-09-26 RX ADMIN — Medication 400 MILLIGRAM(S): at 11:10

## 2017-09-26 RX ADMIN — NYSTATIN CREAM 1 APPLICATION(S): 100000 CREAM TOPICAL at 05:55

## 2017-09-26 RX ADMIN — PANTOPRAZOLE SODIUM 40 MILLIGRAM(S): 20 TABLET, DELAYED RELEASE ORAL at 11:12

## 2017-09-26 RX ADMIN — Medication 1 EACH: at 18:11

## 2017-09-26 RX ADMIN — Medication 400 MILLIGRAM(S): at 01:46

## 2017-09-26 RX ADMIN — Medication 1000 MILLIGRAM(S): at 11:25

## 2017-09-26 RX ADMIN — Medication 50 MILLIGRAM(S): at 22:37

## 2017-09-26 RX ADMIN — ENOXAPARIN SODIUM 30 MILLIGRAM(S): 100 INJECTION SUBCUTANEOUS at 18:10

## 2017-09-26 RX ADMIN — I.V. FAT EMULSION 20.83 GRAM(S): 20 EMULSION INTRAVENOUS at 05:54

## 2017-09-26 RX ADMIN — ESCITALOPRAM OXALATE 20 MILLIGRAM(S): 10 TABLET, FILM COATED ORAL at 21:45

## 2017-09-26 RX ADMIN — HYDROMORPHONE HYDROCHLORIDE 0.5 MILLIGRAM(S): 2 INJECTION INTRAMUSCULAR; INTRAVENOUS; SUBCUTANEOUS at 16:18

## 2017-09-26 RX ADMIN — Medication 5 MILLIGRAM(S): at 21:45

## 2017-09-26 RX ADMIN — Medication 100 UNIT(S): at 11:12

## 2017-09-26 RX ADMIN — Medication 1000 MILLIGRAM(S): at 22:35

## 2017-09-26 RX ADMIN — Medication 1 APPLICATION(S): at 11:13

## 2017-09-26 RX ADMIN — SODIUM CHLORIDE 10 MILLILITER(S): 9 INJECTION INTRAMUSCULAR; INTRAVENOUS; SUBCUTANEOUS at 21:45

## 2017-09-26 RX ADMIN — Medication 1000 MILLIGRAM(S): at 02:00

## 2017-09-26 RX ADMIN — NYSTATIN CREAM 1 APPLICATION(S): 100000 CREAM TOPICAL at 18:11

## 2017-09-26 RX ADMIN — LOSARTAN POTASSIUM 50 MILLIGRAM(S): 100 TABLET, FILM COATED ORAL at 05:54

## 2017-09-26 RX ADMIN — ENOXAPARIN SODIUM 30 MILLIGRAM(S): 100 INJECTION SUBCUTANEOUS at 05:54

## 2017-09-26 NOTE — PROGRESS NOTE ADULT - SUBJECTIVE AND OBJECTIVE BOX
O/N: VSS, BRAYDEN  9/25: BRAYDEN. Vac in place. OVERNIGHT EVENTS:  VSS, BRAYDEN  9/25: BRAYDEN. Vac in place.     STATUS POST:    7/24: SBR resection, fistula resection, revision of esophagogegunostomy drain through esophageal hiatus in R mediastinum  7/29: IR drainage of 800 cc of succus  7/29: Ex-lap, SB anastamosis in BP limb breakdown with succus throughout abdomen  8/1: abd washout, drainage of abscess, biologic mesh placement, closure of abdominal wall, vac and retention suture  8/7: abd washout, mesh removal, frozen abd noted, LLQ CHECO replaced with benson drain  8/10: leak noted from previous anastamosis site on L side, mid abdomen malecot drain placed in enterotomy, JPs x 2 placed, necrotic fascia debrided, vicryl mesh sutured to fascia circumferentially, xeroform over mesh then vac dressing placed	      SUBJECTIVE: Patient examined bedside. Patient complains of pain around vac site.  Flatus: [X] YES [] NO             Bowel Movement: [X ] YES [ ] NO  Pain (0-10):            Pain Control Adequate: [X ] YES [ ] NO  Nausea: [ ] YES [ X] NO            Vomiting: [ ] YES [X ] NO  Diarrhea: [ ] YES [ X] NO         Constipation: [ ] YES [X ] NO     Chest Pain: [ ] YES [X ] NO    SOB:  [ ] YES [X ] NO    losartan 50 milliGRAM(s) Oral daily  heparin  flush 100 Units/mL Injectable 100 Unit(s) IV Push every other day  enoxaparin Injectable 30 milliGRAM(s) SubCutaneous two times a day      Vital Signs Last 24 Hrs  T(C): 36.8 (26 Sep 2017 05:50), Max: 36.9 (25 Sep 2017 14:01)  T(F): 98.3 (26 Sep 2017 05:50), Max: 98.5 (25 Sep 2017 14:01)  HR: 84 (26 Sep 2017 05:50) (82 - 89)  BP: 145/101 (26 Sep 2017 05:50) (145/101 - 153/91)  BP(mean): --  RR: 19 (26 Sep 2017 05:50) (16 - 19)  SpO2: 97% (26 Sep 2017 05:50) (95% - 97%)  I&O's Detail    25 Sep 2017 07:01  -  26 Sep 2017 07:00  --------------------------------------------------------  IN:    Fat Emulsion 20%: 62.4 mL    Solution: 100 mL    TPN (Total Parenteral Nutrition): 1536 mL  Total IN: 1698.4 mL    OUT:    Bulb: 5 mL    Drain: 75 mL    VAC (Vacuum Assisted Closure) System: 350 mL    Voided: 1700 mL  Total OUT: 2130 mL    Total NET: -431.6 mL          General: NAD, resting comfortably in bed  C/V: NSR  Pulm: Nonlabored breathing, no respiratory distress  Abd: soft, non distended , TTP around vac site, wound vac functioning -  Extrem: WWP, no edema, SCDs in place  Drains:  Aimee:      LABS:                        11.5   8.4   )-----------( 281      ( 25 Sep 2017 06:37 )             37.6     09-25    139  |  102  |  34<H>  ----------------------------<  156<H>  4.2   |  24  |  0.50    Ca    10.0      25 Sep 2017 06:37  Phos  3.2     09-25  Mg     2.2     09-25

## 2017-09-26 NOTE — PROGRESS NOTE ADULT - SUBJECTIVE AND OBJECTIVE BOX
On interval followup, the left int jugular catheter site is clean, dry, non-inflamed and non-tender. Afebrile. Notes and cultures are followed.

## 2017-09-27 RX ORDER — ELECTROLYTE SOLUTION,INJ
1 VIAL (ML) INTRAVENOUS
Qty: 0 | Refills: 0 | Status: DISCONTINUED | OUTPATIENT
Start: 2017-09-27 | End: 2017-09-27

## 2017-09-27 RX ORDER — HYDROMORPHONE HYDROCHLORIDE 2 MG/ML
0.5 INJECTION INTRAMUSCULAR; INTRAVENOUS; SUBCUTANEOUS ONCE
Qty: 0 | Refills: 0 | Status: DISCONTINUED | OUTPATIENT
Start: 2017-09-27 | End: 2017-09-27

## 2017-09-27 RX ORDER — HYDROMORPHONE HYDROCHLORIDE 2 MG/ML
0.25 INJECTION INTRAMUSCULAR; INTRAVENOUS; SUBCUTANEOUS DAILY
Qty: 0 | Refills: 0 | Status: DISCONTINUED | OUTPATIENT
Start: 2017-09-27 | End: 2017-10-04

## 2017-09-27 RX ORDER — ACETAMINOPHEN 500 MG
1000 TABLET ORAL ONCE
Qty: 0 | Refills: 0 | Status: COMPLETED | OUTPATIENT
Start: 2017-09-27 | End: 2017-09-27

## 2017-09-27 RX ORDER — I.V. FAT EMULSION 20 G/100ML
50 EMULSION INTRAVENOUS ONCE
Qty: 0 | Refills: 0 | Status: COMPLETED | OUTPATIENT
Start: 2017-09-27 | End: 2017-09-27

## 2017-09-27 RX ADMIN — ENOXAPARIN SODIUM 30 MILLIGRAM(S): 100 INJECTION SUBCUTANEOUS at 05:09

## 2017-09-27 RX ADMIN — Medication 400 MILLIGRAM(S): at 05:14

## 2017-09-27 RX ADMIN — Medication 5 MILLIGRAM(S): at 23:41

## 2017-09-27 RX ADMIN — HYDROMORPHONE HYDROCHLORIDE 0.5 MILLIGRAM(S): 2 INJECTION INTRAMUSCULAR; INTRAVENOUS; SUBCUTANEOUS at 22:17

## 2017-09-27 RX ADMIN — ESCITALOPRAM OXALATE 20 MILLIGRAM(S): 10 TABLET, FILM COATED ORAL at 22:41

## 2017-09-27 RX ADMIN — LOSARTAN POTASSIUM 50 MILLIGRAM(S): 100 TABLET, FILM COATED ORAL at 05:09

## 2017-09-27 RX ADMIN — CALCITRIOL 1 MICROGRAM(S): 0.5 CAPSULE ORAL at 13:45

## 2017-09-27 RX ADMIN — Medication 1 APPLICATION(S): at 13:45

## 2017-09-27 RX ADMIN — Medication 50 MILLIGRAM(S): at 22:41

## 2017-09-27 RX ADMIN — HYDROMORPHONE HYDROCHLORIDE 0.25 MILLIGRAM(S): 2 INJECTION INTRAMUSCULAR; INTRAVENOUS; SUBCUTANEOUS at 17:37

## 2017-09-27 RX ADMIN — I.V. FAT EMULSION 20.83 GRAM(S): 20 EMULSION INTRAVENOUS at 23:41

## 2017-09-27 RX ADMIN — Medication 1000 MILLIGRAM(S): at 05:51

## 2017-09-27 RX ADMIN — NYSTATIN CREAM 1 APPLICATION(S): 100000 CREAM TOPICAL at 06:40

## 2017-09-27 RX ADMIN — PREGABALIN 1000 MICROGRAM(S): 225 CAPSULE ORAL at 13:45

## 2017-09-27 RX ADMIN — HYDROMORPHONE HYDROCHLORIDE 0.5 MILLIGRAM(S): 2 INJECTION INTRAMUSCULAR; INTRAVENOUS; SUBCUTANEOUS at 21:29

## 2017-09-27 RX ADMIN — Medication 1 EACH: at 17:38

## 2017-09-27 RX ADMIN — PANTOPRAZOLE SODIUM 40 MILLIGRAM(S): 20 TABLET, DELAYED RELEASE ORAL at 13:45

## 2017-09-27 RX ADMIN — Medication 400 MILLIGRAM(S): at 23:41

## 2017-09-27 RX ADMIN — NYSTATIN CREAM 1 APPLICATION(S): 100000 CREAM TOPICAL at 17:44

## 2017-09-27 RX ADMIN — HYDROMORPHONE HYDROCHLORIDE 0.25 MILLIGRAM(S): 2 INJECTION INTRAMUSCULAR; INTRAVENOUS; SUBCUTANEOUS at 18:00

## 2017-09-27 RX ADMIN — Medication 400 MILLIGRAM(S): at 13:51

## 2017-09-27 RX ADMIN — SODIUM CHLORIDE 10 MILLILITER(S): 9 INJECTION INTRAMUSCULAR; INTRAVENOUS; SUBCUTANEOUS at 22:41

## 2017-09-27 RX ADMIN — ENOXAPARIN SODIUM 30 MILLIGRAM(S): 100 INJECTION SUBCUTANEOUS at 17:38

## 2017-09-27 RX ADMIN — Medication 1000 MILLIGRAM(S): at 14:10

## 2017-09-27 NOTE — PROGRESS NOTE ADULT - SUBJECTIVE AND OBJECTIVE BOX
O/N: VSS, BRAYDEN  9/26: Given IV tylenol for pain control , wound vac functioning no leak OVERNIGHT EVENTS: VSS, BRAYDEN  9/26: Given IV tylenol for pain control , wound vac changed functioning no leak    STATUS POST:          SUBJECTIVE:  Flatus: [X] YES [] NO             Bowel Movement: [ X] YES [ ] NO  Pain (0-10):            Pain Control Adequate: [X ] YES [ ] NO  Nausea: [ ] YES [X ] NO            Vomiting: [ ] YES [X ] NO  Diarrhea: [ ] YES [X ] NO         Constipation: [ ] YES [X ] NO     Chest Pain: [ ] YES [X ] NO    SOB:  [ ] YES [X ] NO    losartan 50 milliGRAM(s) Oral daily  enoxaparin Injectable 30 milliGRAM(s) SubCutaneous two times a day  heparin  flush 100 Units/mL Injectable 100 Unit(s) IV Push every other day      Vital Signs Last 24 Hrs  T(C): 36.4 (27 Sep 2017 05:35), Max: 37.1 (26 Sep 2017 20:40)  T(F): 97.6 (27 Sep 2017 05:35), Max: 98.8 (26 Sep 2017 20:40)  HR: 69 (27 Sep 2017 05:35) (69 - 89)  BP: 150/91 (27 Sep 2017 05:35) (120/70 - 158/95)  BP(mean): --  RR: 16 (27 Sep 2017 05:35) (16 - 20)  SpO2: 98% (27 Sep 2017 05:35) (94% - 98%)  I&O's Detail    26 Sep 2017 07:01  -  27 Sep 2017 07:00  --------------------------------------------------------  IN:    Fat Emulsion 20%: 145.6 mL    Solution: 100 mL    TPN (Total Parenteral Nutrition): 1088 mL  Total IN: 1333.6 mL    OUT:    VAC (Vacuum Assisted Closure) System: 250 mL    Voided: 675 mL  Total OUT: 925 mL    Total NET: 408.6 mL          General: NAD, resting comfortably in bed  C/V: NSR  Pulm: Nonlabored breathing, no respiratory distress  Abd: soft, TTP around wound vac site, wound vac functioning   Extrem: WWP, no edema, SCDs in place  Drains: Bilious         LABS:                        11.5   9.1   )-----------( 284      ( 26 Sep 2017 07:52 )             36.3     09-26    138  |  102  |  28<H>  ----------------------------<  149<H>  4.0   |  23  |  0.47<L>    Ca    9.8      26 Sep 2017 07:52  Phos  3.1     09-26  Mg     2.0     09-26

## 2017-09-28 LAB
ALBUMIN SERPL ELPH-MCNC: 3.2 G/DL — LOW (ref 3.3–5)
ALP SERPL-CCNC: 222 U/L — HIGH (ref 40–120)
ALT FLD-CCNC: 21 U/L — SIGNIFICANT CHANGE UP (ref 10–45)
ANION GAP SERPL CALC-SCNC: 11 MMOL/L — SIGNIFICANT CHANGE UP (ref 5–17)
AST SERPL-CCNC: 17 U/L — SIGNIFICANT CHANGE UP (ref 10–40)
BILIRUB SERPL-MCNC: 0.8 MG/DL — SIGNIFICANT CHANGE UP (ref 0.2–1.2)
BUN SERPL-MCNC: 26 MG/DL — HIGH (ref 7–23)
CALCIUM SERPL-MCNC: 10 MG/DL — SIGNIFICANT CHANGE UP (ref 8.4–10.5)
CHLORIDE SERPL-SCNC: 102 MMOL/L — SIGNIFICANT CHANGE UP (ref 96–108)
CO2 SERPL-SCNC: 27 MMOL/L — SIGNIFICANT CHANGE UP (ref 22–31)
CREAT SERPL-MCNC: 0.48 MG/DL — LOW (ref 0.5–1.3)
GLUCOSE SERPL-MCNC: 129 MG/DL — HIGH (ref 70–99)
HCT VFR BLD CALC: 36.7 % — SIGNIFICANT CHANGE UP (ref 34.5–45)
HGB BLD-MCNC: 11.4 G/DL — LOW (ref 11.5–15.5)
MAGNESIUM SERPL-MCNC: 1.9 MG/DL — SIGNIFICANT CHANGE UP (ref 1.6–2.6)
MAGNESIUM SERPL-MCNC: 1.9 MG/DL — SIGNIFICANT CHANGE UP (ref 1.6–2.6)
MCHC RBC-ENTMCNC: 29.6 PG — SIGNIFICANT CHANGE UP (ref 27–34)
MCHC RBC-ENTMCNC: 31.1 G/DL — LOW (ref 32–36)
MCV RBC AUTO: 95.3 FL — SIGNIFICANT CHANGE UP (ref 80–100)
PHOSPHATE SERPL-MCNC: 3.2 MG/DL — SIGNIFICANT CHANGE UP (ref 2.5–4.5)
PHOSPHATE SERPL-MCNC: 3.2 MG/DL — SIGNIFICANT CHANGE UP (ref 2.5–4.5)
PLATELET # BLD AUTO: 291 K/UL — SIGNIFICANT CHANGE UP (ref 150–400)
POTASSIUM SERPL-MCNC: 3.8 MMOL/L — SIGNIFICANT CHANGE UP (ref 3.5–5.3)
POTASSIUM SERPL-SCNC: 3.8 MMOL/L — SIGNIFICANT CHANGE UP (ref 3.5–5.3)
PROT SERPL-MCNC: 7 G/DL — SIGNIFICANT CHANGE UP (ref 6–8.3)
RBC # BLD: 3.85 M/UL — SIGNIFICANT CHANGE UP (ref 3.8–5.2)
RBC # FLD: 17.5 % — HIGH (ref 10.3–16.9)
SODIUM SERPL-SCNC: 140 MMOL/L — SIGNIFICANT CHANGE UP (ref 135–145)
WBC # BLD: 7.4 K/UL — SIGNIFICANT CHANGE UP (ref 3.8–10.5)
WBC # FLD AUTO: 7.4 K/UL — SIGNIFICANT CHANGE UP (ref 3.8–10.5)

## 2017-09-28 RX ORDER — ACETAMINOPHEN 500 MG
1000 TABLET ORAL ONCE
Qty: 0 | Refills: 0 | Status: COMPLETED | OUTPATIENT
Start: 2017-09-28 | End: 2017-09-28

## 2017-09-28 RX ORDER — ELECTROLYTE SOLUTION,INJ
1 VIAL (ML) INTRAVENOUS
Qty: 0 | Refills: 0 | Status: DISCONTINUED | OUTPATIENT
Start: 2017-09-28 | End: 2017-09-28

## 2017-09-28 RX ADMIN — Medication 1000 MILLIGRAM(S): at 15:55

## 2017-09-28 RX ADMIN — PANTOPRAZOLE SODIUM 40 MILLIGRAM(S): 20 TABLET, DELAYED RELEASE ORAL at 11:42

## 2017-09-28 RX ADMIN — Medication 400 MILLIGRAM(S): at 15:40

## 2017-09-28 RX ADMIN — NYSTATIN CREAM 1 APPLICATION(S): 100000 CREAM TOPICAL at 06:32

## 2017-09-28 RX ADMIN — Medication 1000 MILLIGRAM(S): at 07:00

## 2017-09-28 RX ADMIN — Medication 400 MILLIGRAM(S): at 06:31

## 2017-09-28 RX ADMIN — NYSTATIN CREAM 1 APPLICATION(S): 100000 CREAM TOPICAL at 17:09

## 2017-09-28 RX ADMIN — ESCITALOPRAM OXALATE 20 MILLIGRAM(S): 10 TABLET, FILM COATED ORAL at 21:12

## 2017-09-28 RX ADMIN — Medication 5 MILLIGRAM(S): at 22:02

## 2017-09-28 RX ADMIN — Medication 100 UNIT(S): at 11:42

## 2017-09-28 RX ADMIN — LOSARTAN POTASSIUM 50 MILLIGRAM(S): 100 TABLET, FILM COATED ORAL at 05:31

## 2017-09-28 RX ADMIN — HYDROMORPHONE HYDROCHLORIDE 0.25 MILLIGRAM(S): 2 INJECTION INTRAMUSCULAR; INTRAVENOUS; SUBCUTANEOUS at 17:03

## 2017-09-28 RX ADMIN — ENOXAPARIN SODIUM 30 MILLIGRAM(S): 100 INJECTION SUBCUTANEOUS at 17:02

## 2017-09-28 RX ADMIN — ENOXAPARIN SODIUM 30 MILLIGRAM(S): 100 INJECTION SUBCUTANEOUS at 05:31

## 2017-09-28 RX ADMIN — Medication 1000 MILLIGRAM(S): at 00:28

## 2017-09-28 RX ADMIN — Medication 1 EACH: at 17:03

## 2017-09-28 RX ADMIN — Medication 50 MILLIGRAM(S): at 21:12

## 2017-09-28 NOTE — PROVIDER CONTACT NOTE (OTHER) - BACKGROUND
Gastric by pass (2002), revision in 2016. SBR ,fistula resection 7/24/17  To change the wound vac dressing

## 2017-09-28 NOTE — PROGRESS NOTE ADULT - SUBJECTIVE AND OBJECTIVE BOX
O/N: VSS, vac changed and continues to need adjustments  9/27: VSS, BRAYDEN    STATUS POST:  7/24: SBR resection, fistula resection, revision of esophagogegunostomy drain through esophageal hiatus in R mediastinum  7/29: IR drainage of 800 cc of succus  7/29: Ex-lap, SB anastamosis in BP limb breakdown with succus throughout abdomen  8/1: abd washout, drainage of abscess, biologic mesh placement, closure of abdominal wall, vac and retention suture  8/7: abd washout, mesh removal, frozen abd noted, LLQ CHECO replaced with benson drain  8/10: leak noted from previous anastamosis site on L side, mid abdomen malecot drain placed in enterotomy, JPs x 2 placed, necrotic fascia debrided, vicryl mesh sutured to fascia circumferentially, xeroform over mesh then vac dressing placed O/N: VSS, vac changed and continues to need adjustments  9/27: VSS, BRAYDEN    STATUS POST:  7/24: SBR resection, fistula resection, revision of esophagogegunostomy drain through esophageal hiatus in R mediastinum  7/29: IR drainage of 800 cc of succus  7/29: Ex-lap, SB anastamosis in BP limb breakdown with succus throughout abdomen  8/1: abd washout, drainage of abscess, biologic mesh placement, closure of abdominal wall, vac and retention suture  8/7: abd washout, mesh removal, frozen abd noted, LLQ CHECO replaced with benson drain  8/10: leak noted from previous anastamosis site on L side, mid abdomen malecot drain placed in enterotomy, JPs x 2 placed, necrotic fascia debrided, vicryl mesh sutured to fascia circumferentially, xeroform over mesh then vac dressing placed	      SUBJECTIVE: Patient examined bedside. Patient denies any complaints.  Flatus: [X] YES [] NO             Bowel Movement: [ X] YES [ ] NO  Pain (0-10):            Pain Control Adequate: [X ] YES [ ] NO  Nausea: [ ] YES [X ] NO            Vomiting: [ ] YES [X ] NO  Diarrhea: [ ] YES [X ] NO         Constipation: [ ] YES [X ] NO     Chest Pain: [ ] YES [X ] NO    SOB:  [ ] YES [X ] NO    losartan 50 milliGRAM(s) Oral daily  enoxaparin Injectable 30 milliGRAM(s) SubCutaneous two times a day  heparin  flush 100 Units/mL Injectable 100 Unit(s) IV Push every other day      Vital Signs Last 24 Hrs  T(C): 36.4 (28 Sep 2017 05:54), Max: 37.1 (27 Sep 2017 20:35)  T(F): 97.5 (28 Sep 2017 05:54), Max: 98.7 (27 Sep 2017 20:35)  HR: 70 (28 Sep 2017 05:54) (70 - 77)  BP: 142/81 (28 Sep 2017 05:54) (135/91 - 169/99)  BP(mean): --  RR: 17 (28 Sep 2017 05:54) (16 - 18)  SpO2: 97% (28 Sep 2017 05:54) (95% - 99%)  I&O's Detail    27 Sep 2017 07:01  -  28 Sep 2017 07:00  --------------------------------------------------------  IN:    Fat Emulsion 20%: 187.2 mL    Solution: 200 mL    TPN (Total Parenteral Nutrition): 1536 mL  Total IN: 1923.2 mL    OUT:    VAC (Vacuum Assisted Closure) System: 300 mL    Voided: 1400 mL  Total OUT: 1700 mL    Total NET: 223.2 mL          General: NAD, resting comfortably in bed  C/V: NSR  Pulm: Nonlabored breathing, no respiratory distress  Abd: soft, TTP around wound vac, wound vac functioning.  Extrem: WWP, no edema, SCDs in place        LABS:                        11.4   7.4   )-----------( 291      ( 28 Sep 2017 07:47 )             36.7     09-28    140  |  102  |  26<H>  ----------------------------<  129<H>  3.8   |  27  |  0.48<L>    Ca    10.0      28 Sep 2017 07:47  Phos  3.2     09-28  Mg     1.9     09-28    TPro  7.0  /  Alb  3.2<L>  /  TBili  0.8  /  DBili  x   /  AST  17  /  ALT  21  /  AlkPhos  222<H>  09-28

## 2017-09-28 NOTE — PROGRESS NOTE ADULT - ASSESSMENT
57 F s/p  SBR, fistula resection, esophagojejunostomy revision w/ post-op course complicated by RTOR for distal anastomosis breakdown, ATN, acute respiratory failure, & septic shock.     NPO/TPN/IVF  Pain/nausea control  ISS  Zosyn  L IJ  Nystatin powder  SCDs, SQH, OOBA  Drains: Malecot in enterotomy site, wound vac to midline  Wounds: Midline xiphoid to pubis incision, DTI & stage 2 pressure ulcer

## 2017-09-29 LAB
ALBUMIN SERPL ELPH-MCNC: 3.4 G/DL — SIGNIFICANT CHANGE UP (ref 3.3–5)
ALP SERPL-CCNC: 245 U/L — HIGH (ref 40–120)
ALT FLD-CCNC: 22 U/L — SIGNIFICANT CHANGE UP (ref 10–45)
ANION GAP SERPL CALC-SCNC: 14 MMOL/L — SIGNIFICANT CHANGE UP (ref 5–17)
AST SERPL-CCNC: 28 U/L — SIGNIFICANT CHANGE UP (ref 10–40)
BILIRUB SERPL-MCNC: 0.7 MG/DL — SIGNIFICANT CHANGE UP (ref 0.2–1.2)
BUN SERPL-MCNC: 26 MG/DL — HIGH (ref 7–23)
CALCIUM SERPL-MCNC: 10.4 MG/DL — SIGNIFICANT CHANGE UP (ref 8.4–10.5)
CHLORIDE SERPL-SCNC: 102 MMOL/L — SIGNIFICANT CHANGE UP (ref 96–108)
CO2 SERPL-SCNC: 25 MMOL/L — SIGNIFICANT CHANGE UP (ref 22–31)
CREAT SERPL-MCNC: 0.5 MG/DL — SIGNIFICANT CHANGE UP (ref 0.5–1.3)
GLUCOSE SERPL-MCNC: 152 MG/DL — HIGH (ref 70–99)
POTASSIUM SERPL-MCNC: 4.3 MMOL/L — SIGNIFICANT CHANGE UP (ref 3.5–5.3)
POTASSIUM SERPL-SCNC: 4.3 MMOL/L — SIGNIFICANT CHANGE UP (ref 3.5–5.3)
PROT SERPL-MCNC: 7.3 G/DL — SIGNIFICANT CHANGE UP (ref 6–8.3)
SODIUM SERPL-SCNC: 141 MMOL/L — SIGNIFICANT CHANGE UP (ref 135–145)

## 2017-09-29 RX ORDER — HYDROMORPHONE HYDROCHLORIDE 2 MG/ML
1 INJECTION INTRAMUSCULAR; INTRAVENOUS; SUBCUTANEOUS ONCE
Qty: 0 | Refills: 0 | Status: DISCONTINUED | OUTPATIENT
Start: 2017-09-29 | End: 2017-09-29

## 2017-09-29 RX ORDER — ELECTROLYTE SOLUTION,INJ
1 VIAL (ML) INTRAVENOUS
Qty: 0 | Refills: 0 | Status: DISCONTINUED | OUTPATIENT
Start: 2017-09-29 | End: 2017-09-29

## 2017-09-29 RX ORDER — DIPHENHYDRAMINE HCL 50 MG
25 CAPSULE ORAL ONCE
Qty: 0 | Refills: 0 | Status: COMPLETED | OUTPATIENT
Start: 2017-09-29 | End: 2017-09-30

## 2017-09-29 RX ORDER — ACETAMINOPHEN 500 MG
1000 TABLET ORAL ONCE
Qty: 0 | Refills: 0 | Status: COMPLETED | OUTPATIENT
Start: 2017-09-29 | End: 2017-09-29

## 2017-09-29 RX ADMIN — ESCITALOPRAM OXALATE 20 MILLIGRAM(S): 10 TABLET, FILM COATED ORAL at 21:40

## 2017-09-29 RX ADMIN — Medication 400 MILLIGRAM(S): at 21:40

## 2017-09-29 RX ADMIN — NYSTATIN CREAM 1 APPLICATION(S): 100000 CREAM TOPICAL at 06:13

## 2017-09-29 RX ADMIN — NYSTATIN CREAM 1 APPLICATION(S): 100000 CREAM TOPICAL at 17:52

## 2017-09-29 RX ADMIN — PANTOPRAZOLE SODIUM 40 MILLIGRAM(S): 20 TABLET, DELAYED RELEASE ORAL at 14:11

## 2017-09-29 RX ADMIN — HYDROMORPHONE HYDROCHLORIDE 1 MILLIGRAM(S): 2 INJECTION INTRAMUSCULAR; INTRAVENOUS; SUBCUTANEOUS at 14:00

## 2017-09-29 RX ADMIN — CALCITRIOL 1 MICROGRAM(S): 0.5 CAPSULE ORAL at 14:11

## 2017-09-29 RX ADMIN — HYDROMORPHONE HYDROCHLORIDE 0.25 MILLIGRAM(S): 2 INJECTION INTRAMUSCULAR; INTRAVENOUS; SUBCUTANEOUS at 11:53

## 2017-09-29 RX ADMIN — Medication 1 EACH: at 17:52

## 2017-09-29 RX ADMIN — ENOXAPARIN SODIUM 30 MILLIGRAM(S): 100 INJECTION SUBCUTANEOUS at 06:13

## 2017-09-29 RX ADMIN — Medication 1 APPLICATION(S): at 13:01

## 2017-09-29 RX ADMIN — HYDROMORPHONE HYDROCHLORIDE 0.25 MILLIGRAM(S): 2 INJECTION INTRAMUSCULAR; INTRAVENOUS; SUBCUTANEOUS at 12:10

## 2017-09-29 RX ADMIN — Medication 5 MILLIGRAM(S): at 21:40

## 2017-09-29 RX ADMIN — Medication 50 MILLIGRAM(S): at 21:40

## 2017-09-29 RX ADMIN — HYDROMORPHONE HYDROCHLORIDE 1 MILLIGRAM(S): 2 INJECTION INTRAMUSCULAR; INTRAVENOUS; SUBCUTANEOUS at 13:43

## 2017-09-29 RX ADMIN — LOSARTAN POTASSIUM 50 MILLIGRAM(S): 100 TABLET, FILM COATED ORAL at 06:13

## 2017-09-29 RX ADMIN — ENOXAPARIN SODIUM 30 MILLIGRAM(S): 100 INJECTION SUBCUTANEOUS at 17:52

## 2017-09-29 NOTE — PROGRESS NOTE ADULT - SUBJECTIVE AND OBJECTIVE BOX
O/N: BRAYDEN, VSS  9/28: patient encouraged to ambulate ,     STATUS POST:  7/24: SBR resection, fistula resection, revision of esophagogegunostomy drain through esophageal hiatus in R mediastinum  7/29: IR drainage of 800 cc of succus  7/29: Ex-lap, SB anastamosis in BP limb breakdown with succus throughout abdomen  8/1: abd washout, drainage of abscess, biologic mesh placement, closure of abdominal wall, vac and retention suture  8/7: abd washout, mesh removal, frozen abd noted, LLQ CHECO replaced with benson drain  8/10: leak noted from previous anastamosis site on L side, mid abdomen malecot drain placed in enterotomy, JPs x 2 placed, necrotic fascia debrided, vicryl mesh sutured to fascia circumferentially, xeroform over mesh then vac dressing placed 9/29: vac changed bedside.   O/N: ARMANDO OWEN  9/28: patient encouraged to ambulate ,     STATUS POST:  7/24: SBR resection, fistula resection, revision of esophagogegunostomy drain through esophageal hiatus in R mediastinum  7/29: IR drainage of 800 cc of succus  7/29: Ex-lap, SB anastamosis in BP limb breakdown with succus throughout abdomen  8/1: abd washout, drainage of abscess, biologic mesh placement, closure of abdominal wall, vac and retention suture  8/7: abd washout, mesh removal, frozen abd noted, LLQ CHECO replaced with benson drain  8/10: leak noted from previous anastamosis site on L side, mid abdomen malecot drain placed in enterotomy, JPs x 2 placed, necrotic fascia debrided, vicryl mesh sutured to fascia circumferentially, xeroform over mesh then vac dressing placed	       SUBJECTIVE: Patient seen and examined bedside by chief resident. Abdominal pain well controlled.     losartan 50 milliGRAM(s) Oral daily  enoxaparin Injectable 30 milliGRAM(s) SubCutaneous two times a day  heparin  flush 100 Units/mL Injectable 100 Unit(s) IV Push every other day      Vital Signs Last 24 Hrs  T(C): 37.5 (29 Sep 2017 16:47), Max: 37.5 (29 Sep 2017 16:47)  T(F): 99.5 (29 Sep 2017 16:47), Max: 99.5 (29 Sep 2017 16:47)  HR: 85 (29 Sep 2017 16:47) (79 - 86)  BP: 127/82 (29 Sep 2017 16:47) (116/78 - 155/93)  BP(mean): --  RR: 18 (29 Sep 2017 16:47) (16 - 18)  SpO2: 94% (29 Sep 2017 16:47) (94% - 97%)  I&O's Detail    28 Sep 2017 07:01  -  29 Sep 2017 07:00  --------------------------------------------------------  IN:  Total IN: 0 mL    OUT:    Voided: 700 mL  Total OUT: 700 mL    Total NET: -700 mL      29 Sep 2017 07:01  -  29 Sep 2017 17:27  --------------------------------------------------------  IN:  Total IN: 0 mL    OUT:    VAC (Vacuum Assisted Closure) System: 100 mL  Total OUT: 100 mL    Total NET: -100 mL          General: NAD, resting comfortably in bed  C/V: NSR  Pulm: Nonlabored breathing, no respiratory distress  Abd: soft, NT/ND. Vac was leaking this AM, Changed at bedside.   Extrem: WWP, no edema, SCDs in place        LABS:                        11.4   7.4   )-----------( 291      ( 28 Sep 2017 07:47 )             36.7     09-29    141  |  102  |  26<H>  ----------------------------<  152<H>  4.3   |  25  |  0.50    Ca    10.4      29 Sep 2017 06:31  Phos  3.2     09-28  Mg     1.9     09-28    TPro  7.3  /  Alb  3.4  /  TBili  0.7  /  DBili  x   /  AST  28  /  ALT  22  /  AlkPhos  245<H>  09-29

## 2017-09-29 NOTE — PROGRESS NOTE ADULT - SUBJECTIVE AND OBJECTIVE BOX
On interval followup, the left int jugular catheter site is clean, dry, non-inflamed and non-tender,  T 99 range. Notes, cultures and plans are followed.

## 2017-09-30 RX ORDER — ELECTROLYTE SOLUTION,INJ
1 VIAL (ML) INTRAVENOUS
Qty: 0 | Refills: 0 | Status: DISCONTINUED | OUTPATIENT
Start: 2017-09-30 | End: 2017-09-30

## 2017-09-30 RX ORDER — I.V. FAT EMULSION 20 G/100ML
50 EMULSION INTRAVENOUS
Qty: 0 | Refills: 0 | Status: DISCONTINUED | OUTPATIENT
Start: 2017-09-30 | End: 2017-09-30

## 2017-09-30 RX ORDER — ACETAMINOPHEN 500 MG
1000 TABLET ORAL ONCE
Qty: 0 | Refills: 0 | Status: COMPLETED | OUTPATIENT
Start: 2017-09-30 | End: 2017-09-30

## 2017-09-30 RX ORDER — DIPHENHYDRAMINE HCL 50 MG
25 CAPSULE ORAL ONCE
Qty: 0 | Refills: 0 | Status: COMPLETED | OUTPATIENT
Start: 2017-09-30 | End: 2017-09-30

## 2017-09-30 RX ADMIN — Medication 64 EACH: at 17:20

## 2017-09-30 RX ADMIN — ENOXAPARIN SODIUM 30 MILLIGRAM(S): 100 INJECTION SUBCUTANEOUS at 19:26

## 2017-09-30 RX ADMIN — Medication 1 APPLICATION(S): at 19:27

## 2017-09-30 RX ADMIN — HYDROMORPHONE HYDROCHLORIDE 0.25 MILLIGRAM(S): 2 INJECTION INTRAMUSCULAR; INTRAVENOUS; SUBCUTANEOUS at 13:20

## 2017-09-30 RX ADMIN — NYSTATIN CREAM 1 APPLICATION(S): 100000 CREAM TOPICAL at 19:26

## 2017-09-30 RX ADMIN — Medication 100 UNIT(S): at 13:04

## 2017-09-30 RX ADMIN — ENOXAPARIN SODIUM 30 MILLIGRAM(S): 100 INJECTION SUBCUTANEOUS at 06:10

## 2017-09-30 RX ADMIN — Medication 5 MILLIGRAM(S): at 21:30

## 2017-09-30 RX ADMIN — HYDROMORPHONE HYDROCHLORIDE 0.25 MILLIGRAM(S): 2 INJECTION INTRAMUSCULAR; INTRAVENOUS; SUBCUTANEOUS at 13:05

## 2017-09-30 RX ADMIN — LOSARTAN POTASSIUM 50 MILLIGRAM(S): 100 TABLET, FILM COATED ORAL at 06:10

## 2017-09-30 RX ADMIN — Medication 400 MILLIGRAM(S): at 06:10

## 2017-09-30 RX ADMIN — I.V. FAT EMULSION 20.8 GRAM(S): 20 EMULSION INTRAVENOUS at 22:01

## 2017-09-30 RX ADMIN — NYSTATIN CREAM 1 APPLICATION(S): 100000 CREAM TOPICAL at 06:10

## 2017-09-30 RX ADMIN — ESCITALOPRAM OXALATE 20 MILLIGRAM(S): 10 TABLET, FILM COATED ORAL at 21:30

## 2017-09-30 RX ADMIN — Medication 25 MILLIGRAM(S): at 22:42

## 2017-09-30 RX ADMIN — PANTOPRAZOLE SODIUM 40 MILLIGRAM(S): 20 TABLET, DELAYED RELEASE ORAL at 13:04

## 2017-09-30 RX ADMIN — Medication 50 MILLIGRAM(S): at 21:30

## 2017-09-30 NOTE — PROGRESS NOTE ADULT - SUBJECTIVE AND OBJECTIVE BOX
O/N: BRAYDEN, VSS  9/29: Vac replaced.     STATUS POST:   7/24: SBR resection, fistula resection, revision of esophagogegunostomy drain through esophageal hiatus in R mediastinum  7/29: IR drainage of 800 cc of succus  7/29: Ex-lap, SB anastamosis in BP limb breakdown with succus throughout abdomen  8/1: abd washout, drainage of abscess, biologic mesh placement, closure of abdominal wall, vac and retention suture  8/7: abd washout, mesh removal, frozen abd noted, LLQ CHECO replaced with benson drain  8/10: leak noted from previous anastamosis site on L side, mid abdomen malecot drain placed in enterotomy, JPs x 2 placed, necrotic fascia debrided, vicryl mesh sutured to fascia circumferentially, xeroform over mesh then vac dressing placed O/N: BRAYDEN, VSS  9/29: Vac replaced.     STATUS POST:   7/24: SBR resection, fistula resection, revision of esophagogegunostomy drain through esophageal hiatus in R mediastinum  7/29: IR drainage of 800 cc of succus  7/29: Ex-lap, SB anastamosis in BP limb breakdown with succus throughout abdomen  8/1: abd washout, drainage of abscess, biologic mesh placement, closure of abdominal wall, vac and retention suture  8/7: abd washout, mesh removal, frozen abd noted, LLQ CHECO replaced with benson drain  8/10: leak noted from previous anastamosis site on L side, mid abdomen malecot drain placed in enterotomy, JPs x 2 placed, necrotic fascia debrided, vicryl mesh sutured to fascia circumferentially, xeroform over mesh then vac dressing placed    STATUS POST:       SUBJECTIVE: Patient seen and examined bedside by chief resident. Has become increasingly more difficult. Has begun to directly page team with questions.     losartan 50 milliGRAM(s) Oral daily  enoxaparin Injectable 30 milliGRAM(s) SubCutaneous two times a day  heparin  flush 100 Units/mL Injectable 100 Unit(s) IV Push every other day      Vital Signs Last 24 Hrs  T(C): 36.7 (30 Sep 2017 05:53), Max: 37.5 (29 Sep 2017 16:47)  T(F): 98.1 (30 Sep 2017 05:53), Max: 99.5 (29 Sep 2017 16:47)  HR: 76 (30 Sep 2017 05:53) (76 - 86)  BP: 171/91 (30 Sep 2017 05:53) (116/78 - 171/91)  BP(mean): --  RR: 17 (30 Sep 2017 05:53) (16 - 18)  SpO2: 96% (30 Sep 2017 05:53) (94% - 97%)  I&O's Detail    29 Sep 2017 07:01  -  30 Sep 2017 07:00  --------------------------------------------------------  IN:  Total IN: 0 mL    OUT:    VAC (Vacuum Assisted Closure) System: 100 mL    Voided: 950 mL  Total OUT: 1050 mL    Total NET: -1050 mL          General: NAD, resting comfortably in bed  C/V: NSR  Pulm: Nonlabored breathing, no respiratory distress  Abd: soft, NT/ND. Vac appears to provide adequate suction no leak  Extrem: WWP, no edema, SCDs in place        LABS:                        11.4   7.4   )-----------( 291      ( 28 Sep 2017 07:47 )             36.7     09-29    141  |  102  |  26<H>  ----------------------------<  152<H>  4.3   |  25  |  0.50    Ca    10.4      29 Sep 2017 06:31  Phos  3.2     09-28  Mg     1.9     09-28    TPro  7.3  /  Alb  3.4  /  TBili  0.7  /  DBili  x   /  AST  28  /  ALT  22  /  AlkPhos  245<H>  09-29          RADIOLOGY & ADDITIONAL STUDIES:

## 2017-09-30 NOTE — PROGRESS NOTE ADULT - ASSESSMENT
57 F s/p  SBR, fistula resection, esophagojejunostomy revision w/ post-op course complicated by RTOR for distal anastomosis breakdown, ATN, acute respiratory failure, & septic shock.     NPO/TPN/IVF  Pain/nausea control  ISS  L IJ  Nystatin powder  SCDs, lovenox, OOBA  Drains: Malecot in enterotomy site, wound vac to midline  Wounds: Midline xiphoid to pubis incision; DTI & stage 2 pressure ulcer noted. 57 F s/p  SBR, fistula resection, esophagojejunostomy revision w/ post-op course complicated by RTOR for distal anastomosis breakdown, ATN, acute respiratory failure, & septic shock.     NPO/TPN/IVF  Pain/nausea control  ISS  L IJ  Nystatin powder  Skin: Appears to be inflamed, and macerated around Vac.   SCDs, lovenox, OOBA  Drains: Malecot in enterotomy site, wound vac to midline  Wounds: Midline xiphoid to pubis incision; DTI & stage 2 pressure ulcer noted.

## 2017-10-01 LAB
ANION GAP SERPL CALC-SCNC: 14 MMOL/L — SIGNIFICANT CHANGE UP (ref 5–17)
BUN SERPL-MCNC: 27 MG/DL — HIGH (ref 7–23)
CALCIUM SERPL-MCNC: 9.9 MG/DL — SIGNIFICANT CHANGE UP (ref 8.4–10.5)
CHLORIDE SERPL-SCNC: 99 MMOL/L — SIGNIFICANT CHANGE UP (ref 96–108)
CO2 SERPL-SCNC: 24 MMOL/L — SIGNIFICANT CHANGE UP (ref 22–31)
CREAT SERPL-MCNC: 0.5 MG/DL — SIGNIFICANT CHANGE UP (ref 0.5–1.3)
GLUCOSE SERPL-MCNC: 158 MG/DL — HIGH (ref 70–99)
MAGNESIUM SERPL-MCNC: 1.8 MG/DL — SIGNIFICANT CHANGE UP (ref 1.6–2.6)
PHOSPHATE SERPL-MCNC: 2.7 MG/DL — SIGNIFICANT CHANGE UP (ref 2.5–4.5)
POTASSIUM SERPL-MCNC: 4.1 MMOL/L — SIGNIFICANT CHANGE UP (ref 3.5–5.3)
POTASSIUM SERPL-SCNC: 4.1 MMOL/L — SIGNIFICANT CHANGE UP (ref 3.5–5.3)
SODIUM SERPL-SCNC: 137 MMOL/L — SIGNIFICANT CHANGE UP (ref 135–145)

## 2017-10-01 RX ORDER — HYDROMORPHONE HYDROCHLORIDE 2 MG/ML
0.25 INJECTION INTRAMUSCULAR; INTRAVENOUS; SUBCUTANEOUS ONCE
Qty: 0 | Refills: 0 | Status: DISCONTINUED | OUTPATIENT
Start: 2017-10-01 | End: 2017-10-01

## 2017-10-01 RX ORDER — ELECTROLYTE SOLUTION,INJ
1 VIAL (ML) INTRAVENOUS
Qty: 0 | Refills: 0 | Status: DISCONTINUED | OUTPATIENT
Start: 2017-10-01 | End: 2017-10-01

## 2017-10-01 RX ORDER — ACETAMINOPHEN 500 MG
1000 TABLET ORAL ONCE
Qty: 0 | Refills: 0 | Status: COMPLETED | OUTPATIENT
Start: 2017-10-01 | End: 2017-10-01

## 2017-10-01 RX ORDER — MAGNESIUM SULFATE 500 MG/ML
1 VIAL (ML) INJECTION ONCE
Qty: 0 | Refills: 0 | Status: DISCONTINUED | OUTPATIENT
Start: 2017-10-01 | End: 2017-10-01

## 2017-10-01 RX ADMIN — NYSTATIN CREAM 1 APPLICATION(S): 100000 CREAM TOPICAL at 06:50

## 2017-10-01 RX ADMIN — Medication 1 APPLICATION(S): at 12:34

## 2017-10-01 RX ADMIN — Medication 5 MILLIGRAM(S): at 21:34

## 2017-10-01 RX ADMIN — ENOXAPARIN SODIUM 30 MILLIGRAM(S): 100 INJECTION SUBCUTANEOUS at 18:40

## 2017-10-01 RX ADMIN — Medication 400 MILLIGRAM(S): at 21:52

## 2017-10-01 RX ADMIN — HYDROMORPHONE HYDROCHLORIDE 0.25 MILLIGRAM(S): 2 INJECTION INTRAMUSCULAR; INTRAVENOUS; SUBCUTANEOUS at 12:32

## 2017-10-01 RX ADMIN — HYDROMORPHONE HYDROCHLORIDE 0.25 MILLIGRAM(S): 2 INJECTION INTRAMUSCULAR; INTRAVENOUS; SUBCUTANEOUS at 15:23

## 2017-10-01 RX ADMIN — HYDROMORPHONE HYDROCHLORIDE 0.25 MILLIGRAM(S): 2 INJECTION INTRAMUSCULAR; INTRAVENOUS; SUBCUTANEOUS at 12:47

## 2017-10-01 RX ADMIN — PANTOPRAZOLE SODIUM 40 MILLIGRAM(S): 20 TABLET, DELAYED RELEASE ORAL at 12:32

## 2017-10-01 RX ADMIN — ENOXAPARIN SODIUM 30 MILLIGRAM(S): 100 INJECTION SUBCUTANEOUS at 06:49

## 2017-10-01 RX ADMIN — ESCITALOPRAM OXALATE 20 MILLIGRAM(S): 10 TABLET, FILM COATED ORAL at 21:34

## 2017-10-01 RX ADMIN — Medication 50 MILLIGRAM(S): at 21:33

## 2017-10-01 RX ADMIN — LOSARTAN POTASSIUM 50 MILLIGRAM(S): 100 TABLET, FILM COATED ORAL at 06:49

## 2017-10-01 RX ADMIN — Medication 1 EACH: at 18:39

## 2017-10-01 RX ADMIN — HYDROMORPHONE HYDROCHLORIDE 0.25 MILLIGRAM(S): 2 INJECTION INTRAMUSCULAR; INTRAVENOUS; SUBCUTANEOUS at 15:38

## 2017-10-01 RX ADMIN — NYSTATIN CREAM 1 APPLICATION(S): 100000 CREAM TOPICAL at 18:39

## 2017-10-01 RX ADMIN — HYDROMORPHONE HYDROCHLORIDE 0.25 MILLIGRAM(S): 2 INJECTION INTRAMUSCULAR; INTRAVENOUS; SUBCUTANEOUS at 23:50

## 2017-10-01 NOTE — PROGRESS NOTE ADULT - SUBJECTIVE AND OBJECTIVE BOX
O/N: BRAYDEN. One reported episode of tachycardia to 112, was sleeping when examined.  9/30: Paging team for Benadryl O/N: BRAYDEN. One reported episode of tachycardia to 112, was sleeping when examined.  9/30: Paging team for Benadryl     SUBJECTIVE: Patient seen and examined bedside by chief resident. No acute events overnight. Denies fever, chills, nausea, emesis, chest pain, dyspnea, abdominal pain.     Vital Signs Last 24 Hrs  T(C): 36.9 (01 Oct 2017 06:05), Max: 37.6 (30 Sep 2017 14:49)  T(F): 98.4 (01 Oct 2017 06:05), Max: 99.6 (30 Sep 2017 14:49)  HR: 112 (01 Oct 2017 06:30) (84 - 115)  BP: 135/88 (01 Oct 2017 06:05) (135/88 - 156/108)  BP(mean): --  RR: 19 (01 Oct 2017 06:05) (18 - 19)  SpO2: 95% (01 Oct 2017 06:30) (93% - 95%)  I&O's Detail    30 Sep 2017 07:01  -  01 Oct 2017 07:00  --------------------------------------------------------  IN:    Fat Emulsion 20%: 208 mL    TPN (Total Parenteral Nutrition): 768 mL  Total IN: 976 mL    OUT:    VAC (Vacuum Assisted Closure) System: 600 mL  Total OUT: 600 mL    Total NET: 376 mL      General: NAD, resting comfortably in bed  Pulm: Nonlabored breathing, no respiratory distress  Abd: soft, NT/ND, wound vac in place  Extrem: WWP,  SCDs in place        LABS:    10-01    137  |  99  |  27<H>  ----------------------------<  158<H>  4.1   |  24  |  0.50    Ca    9.9      01 Oct 2017 06:15  Phos  2.7     10-01  Mg     1.8     10-01

## 2017-10-01 NOTE — PROVIDER CONTACT NOTE (CHANGE IN STATUS NOTIFICATION) - ACTION/TREATMENT ORDERED:
MD informed. No interventions ordered. Will continue to order. MD informed. No interventions ordered. Will continue to monitor. Pt remain asymptomatic.

## 2017-10-01 NOTE — PROVIDER CONTACT NOTE (CHANGE IN STATUS NOTIFICATION) - ACTION/TREATMENT ORDERED:
At 0630am repeat  O2sat 95%RA. Pt remain asymptomatic. Pt seen by Dr. Ross and surgical team. No interventions ordered at this time.

## 2017-10-02 LAB
ANION GAP SERPL CALC-SCNC: 11 MMOL/L — SIGNIFICANT CHANGE UP (ref 5–17)
BUN SERPL-MCNC: 30 MG/DL — HIGH (ref 7–23)
CALCIUM SERPL-MCNC: 10 MG/DL — SIGNIFICANT CHANGE UP (ref 8.4–10.5)
CHLORIDE SERPL-SCNC: 100 MMOL/L — SIGNIFICANT CHANGE UP (ref 96–108)
CO2 SERPL-SCNC: 25 MMOL/L — SIGNIFICANT CHANGE UP (ref 22–31)
CREAT SERPL-MCNC: 0.49 MG/DL — LOW (ref 0.5–1.3)
GLUCOSE SERPL-MCNC: 162 MG/DL — HIGH (ref 70–99)
HCT VFR BLD CALC: 35.8 % — SIGNIFICANT CHANGE UP (ref 34.5–45)
HGB BLD-MCNC: 11.3 G/DL — LOW (ref 11.5–15.5)
MAGNESIUM SERPL-MCNC: 2.1 MG/DL — SIGNIFICANT CHANGE UP (ref 1.6–2.6)
MCHC RBC-ENTMCNC: 29.8 PG — SIGNIFICANT CHANGE UP (ref 27–34)
MCHC RBC-ENTMCNC: 31.6 G/DL — LOW (ref 32–36)
MCV RBC AUTO: 94.5 FL — SIGNIFICANT CHANGE UP (ref 80–100)
PHOSPHATE SERPL-MCNC: 3.4 MG/DL — SIGNIFICANT CHANGE UP (ref 2.5–4.5)
PLATELET # BLD AUTO: 238 K/UL — SIGNIFICANT CHANGE UP (ref 150–400)
POTASSIUM SERPL-MCNC: 4.3 MMOL/L — SIGNIFICANT CHANGE UP (ref 3.5–5.3)
POTASSIUM SERPL-SCNC: 4.3 MMOL/L — SIGNIFICANT CHANGE UP (ref 3.5–5.3)
RBC # BLD: 3.79 M/UL — LOW (ref 3.8–5.2)
RBC # FLD: 17.3 % — HIGH (ref 10.3–16.9)
SODIUM SERPL-SCNC: 136 MMOL/L — SIGNIFICANT CHANGE UP (ref 135–145)
WBC # BLD: 6.1 K/UL — SIGNIFICANT CHANGE UP (ref 3.8–10.5)
WBC # FLD AUTO: 6.1 K/UL — SIGNIFICANT CHANGE UP (ref 3.8–10.5)

## 2017-10-02 RX ORDER — I.V. FAT EMULSION 20 G/100ML
50 EMULSION INTRAVENOUS
Qty: 0 | Refills: 0 | Status: DISCONTINUED | OUTPATIENT
Start: 2017-10-02 | End: 2017-10-02

## 2017-10-02 RX ORDER — ELECTROLYTE SOLUTION,INJ
1 VIAL (ML) INTRAVENOUS
Qty: 0 | Refills: 0 | Status: DISCONTINUED | OUTPATIENT
Start: 2017-10-02 | End: 2017-10-02

## 2017-10-02 RX ORDER — HYDROMORPHONE HYDROCHLORIDE 2 MG/ML
1 INJECTION INTRAMUSCULAR; INTRAVENOUS; SUBCUTANEOUS ONCE
Qty: 0 | Refills: 0 | Status: DISCONTINUED | OUTPATIENT
Start: 2017-10-02 | End: 2017-10-02

## 2017-10-02 RX ADMIN — Medication 1 EACH: at 18:51

## 2017-10-02 RX ADMIN — Medication 50 MILLIGRAM(S): at 21:15

## 2017-10-02 RX ADMIN — Medication 1 APPLICATION(S): at 12:21

## 2017-10-02 RX ADMIN — HYDROMORPHONE HYDROCHLORIDE 0.25 MILLIGRAM(S): 2 INJECTION INTRAMUSCULAR; INTRAVENOUS; SUBCUTANEOUS at 00:05

## 2017-10-02 RX ADMIN — HYDROMORPHONE HYDROCHLORIDE 0.25 MILLIGRAM(S): 2 INJECTION INTRAMUSCULAR; INTRAVENOUS; SUBCUTANEOUS at 12:34

## 2017-10-02 RX ADMIN — Medication 5 MILLIGRAM(S): at 22:25

## 2017-10-02 RX ADMIN — I.V. FAT EMULSION 20.83 GRAM(S): 20 EMULSION INTRAVENOUS at 21:15

## 2017-10-02 RX ADMIN — LOSARTAN POTASSIUM 50 MILLIGRAM(S): 100 TABLET, FILM COATED ORAL at 06:01

## 2017-10-02 RX ADMIN — HYDROMORPHONE HYDROCHLORIDE 0.25 MILLIGRAM(S): 2 INJECTION INTRAMUSCULAR; INTRAVENOUS; SUBCUTANEOUS at 12:19

## 2017-10-02 RX ADMIN — Medication 100 UNIT(S): at 12:19

## 2017-10-02 RX ADMIN — HYDROMORPHONE HYDROCHLORIDE 1 MILLIGRAM(S): 2 INJECTION INTRAMUSCULAR; INTRAVENOUS; SUBCUTANEOUS at 10:09

## 2017-10-02 RX ADMIN — HYDROMORPHONE HYDROCHLORIDE 1 MILLIGRAM(S): 2 INJECTION INTRAMUSCULAR; INTRAVENOUS; SUBCUTANEOUS at 10:24

## 2017-10-02 RX ADMIN — CALCITRIOL 1 MICROGRAM(S): 0.5 CAPSULE ORAL at 12:19

## 2017-10-02 RX ADMIN — NYSTATIN CREAM 1 APPLICATION(S): 100000 CREAM TOPICAL at 18:50

## 2017-10-02 RX ADMIN — SODIUM CHLORIDE 10 MILLILITER(S): 9 INJECTION INTRAMUSCULAR; INTRAVENOUS; SUBCUTANEOUS at 21:15

## 2017-10-02 RX ADMIN — PANTOPRAZOLE SODIUM 40 MILLIGRAM(S): 20 TABLET, DELAYED RELEASE ORAL at 12:19

## 2017-10-02 RX ADMIN — ESCITALOPRAM OXALATE 20 MILLIGRAM(S): 10 TABLET, FILM COATED ORAL at 21:15

## 2017-10-02 RX ADMIN — ENOXAPARIN SODIUM 30 MILLIGRAM(S): 100 INJECTION SUBCUTANEOUS at 06:01

## 2017-10-02 RX ADMIN — ENOXAPARIN SODIUM 30 MILLIGRAM(S): 100 INJECTION SUBCUTANEOUS at 18:50

## 2017-10-02 RX ADMIN — NYSTATIN CREAM 1 APPLICATION(S): 100000 CREAM TOPICAL at 06:01

## 2017-10-02 NOTE — CHART NOTE - NSCHARTNOTEFT_GEN_A_CORE
Admitting Diagnosis:   Patient is a 57y old  Female who presents with a chief complaint of enterocutaneous fistula (24 Jul 2017 14:15)  Pt admitted w/ leaking fistulas. Wound vac dressing leaking 10/1. Ostomy pouch intact.    PAST MEDICAL & SURGICAL HISTORY:  Reflux  Obesity  DVT (deep venous thrombosis): 2013 neck  Diverticulitis  Hypertension  Elective surgery: abodominal wall surgery  dec 2016  Elective surgery: NOV 2016 gastric bypass revision  Gastric bypass status for obesity: gastric sleeve, 6/2012  S/P breast biopsy: 2011, left  S/P colon resection: 2011  S/P knee surgery: repair 2009, 2011  right; left  Umbilical hernia: 2000  S/P appendectomy: 1975  S/P tonsillectomy: 1967      Current Nutrition Order:   TPN via PICC line: 254 g D, 104 g AA, no lipids today. Providing pt with 1279 kcal, 104 g protein, 1858 mL fluid. GIR = 1.77.    GI Issues: Abdominal pain, nausea reported. +BM, +flatus    Pain: Being managed     Skin Integrity: DTI, stage II pressure injury, wound vac to midline abdomen, malecot in entertotomy site      Labs:   10-02    136  |  100  |  30<H>  ----------------------------<  162<H>  4.3   |  25  |  0.49<L>    Ca    10.0      02 Oct 2017 06:13  Phos  3.4     10-02  Mg     2.1     10-02      CAPILLARY BLOOD GLUCOSE  143 (02 Oct 2017 09:15)  128 (01 Oct 2017 21:06)  141 (01 Oct 2017 13:34)          Medications:  MEDICATIONS  (STANDING):  insulin lispro (HumaLOG) corrective regimen sliding scale   SubCutaneous every 6 hours  dextrose 5%. 1000 milliLiter(s) (50 mL/Hr) IV Continuous <Continuous>  dextrose 50% Injectable 25 Gram(s) IV Push once  sodium chloride 0.9% lock flush 20 milliLiter(s) IV Push once  cyanocobalamin Injectable 1000 MICROGram(s) SubCutaneous every 7 days  dextrose 50% Injectable 12.5 Gram(s) IV Push once  pantoprazole  Injectable 40 milliGRAM(s) IV Push daily  calcitriol Injectable 1 MICROGram(s) IV Push <User Schedule>  collagenase Ointment 1 Application(s) Topical daily  escitalopram 20 milliGRAM(s) Oral daily  losartan 50 milliGRAM(s) Oral daily  diphenhydrAMINE   Injectable 50 milliGRAM(s) IV Push at bedtime  enoxaparin Injectable 30 milliGRAM(s) SubCutaneous two times a day  heparin  flush 100 Units/mL Injectable 100 Unit(s) IV Push every other day  nystatin Powder 1 Application(s) Topical two times a day  diazepam    Tablet 5 milliGRAM(s) Oral at bedtime  HYDROmorphone  Injectable 0.25 milliGRAM(s) IV Push daily  Parenteral Nutrition - Adult 1 Each (64 mL/Hr) TPN Continuous <Continuous>  Parenteral Nutrition - Adult 1 Each (64 mL/Hr) TPN Continuous <Continuous>  fat emulsion 20% Infusion 50 Gram(s) (20.8 mL/Hr) IV Continuous <Continuous>    MEDICATIONS  (PRN):  sodium chloride 0.9% lock flush 10 milliLiter(s) IV Push every 1 hour PRN After each medication administration  sodium chloride 0.9% lock flush 10 milliLiter(s) IV Push every 12 hours PRN Lumen of catheter NOT used    Weight: 99.5kg     Weight Change: No new weight since Aug 1st. Need to obtain new actual weight.    Estimated energy needs: remain unchanged from previous follow ups  Estimated energy needs using 52 kg IBW:  30-35 kcal/kg (4027-2619 kcal).   1.8-2 g/kg ( g protein).  30-35 mL/kg (1591-4932 mL fluid).     Subjective: Pt remains on TPN via PICC line. NPO except meds. No lipids today; No new triglyceride labs, .  Malecot in enterotomy site, wound vac to midline, DTI, and stage II pressure injury. TPN providing 100% estimated needs. Pt stated MDs want her to eat eggs and toast contrary to converstion w/ RN and Team 2.  Pt reports flatus, denies any recent BMs. Pt to continue receiving 100% nutriton via TPN until fistulas resolve and reconstructive surgery is feasible.     Previous Nutrition Diagnosis: Increased nutrient needs RT fistula & s/p surgery AEB increased kcal/protein needs.     Active [  X]  Resolved [   ]    Goal: PT to meet >75% EER via tolerated route    Recommendations:   1. Please take actual weight for trending purposes  2. Continue with current TPN order  3. PO diet per MD discretion   4. Continue to check labs: BG, Phos, Mg, K, Na, TG  5 Check labs for signs of fat malabsorption, consider ADEK vitamin.     Education: Understands meeting nutrient needs via TPN.    Risk Level: High [ X  ] Moderate [   ] Low [   ].

## 2017-10-02 NOTE — PROGRESS NOTE ADULT - SUBJECTIVE AND OBJECTIVE BOX
On interval followup, the left int jugular catheter site is clean, dry, non-inflamed and non-tender. T  range.  Notes and cultures are followed.

## 2017-10-02 NOTE — PROGRESS NOTE ADULT - SUBJECTIVE AND OBJECTIVE BOX
INTERVAL HPI/OVERNIGHT EVENTS: No acute events, AVSS    SUBJECTIVE:  Flatus: [ ] YES [ ] NO             Bowel Movement: [ ] YES [ ] NO  Pain (0-10):            Pain Control Adequate: [ ] YES [ ] NO  Nausea: [ ] YES [ ] NO            Vomiting: [ ] YES [ ] NO  Diarrhea: [ ] YES [ ] NO         Constipation: [ ] YES [ ] NO     Chest Pain: [ ] YES [ ] NO    SOB:  [ ] YES [ ] NO    MEDICATIONS  (STANDING):  insulin lispro (HumaLOG) corrective regimen sliding scale   SubCutaneous every 6 hours  dextrose 5%. 1000 milliLiter(s) (50 mL/Hr) IV Continuous <Continuous>  dextrose 50% Injectable 25 Gram(s) IV Push once  sodium chloride 0.9% lock flush 20 milliLiter(s) IV Push once  cyanocobalamin Injectable 1000 MICROGram(s) SubCutaneous every 7 days  dextrose 50% Injectable 12.5 Gram(s) IV Push once  pantoprazole  Injectable 40 milliGRAM(s) IV Push daily  calcitriol Injectable 1 MICROGram(s) IV Push <User Schedule>  collagenase Ointment 1 Application(s) Topical daily  escitalopram 20 milliGRAM(s) Oral daily  losartan 50 milliGRAM(s) Oral daily  diphenhydrAMINE   Injectable 50 milliGRAM(s) IV Push at bedtime  enoxaparin Injectable 30 milliGRAM(s) SubCutaneous two times a day  heparin  flush 100 Units/mL Injectable 100 Unit(s) IV Push every other day  nystatin Powder 1 Application(s) Topical two times a day  diazepam    Tablet 5 milliGRAM(s) Oral at bedtime  HYDROmorphone  Injectable 0.25 milliGRAM(s) IV Push daily  Parenteral Nutrition - Adult 1 Each (64 mL/Hr) TPN Continuous <Continuous>    MEDICATIONS  (PRN):  sodium chloride 0.9% lock flush 10 milliLiter(s) IV Push every 1 hour PRN After each medication administration  sodium chloride 0.9% lock flush 10 milliLiter(s) IV Push every 12 hours PRN Lumen of catheter NOT used      Vital Signs Last 24 Hrs  T(C): 37.2 (01 Oct 2017 22:39), Max: 38.1 (01 Oct 2017 21:02)  T(F): 99 (01 Oct 2017 22:39), Max: 100.6 (01 Oct 2017 21:02)  HR: 89 (01 Oct 2017 22:39) (89 - 113)  BP: 124/70 (01 Oct 2017 22:39) (120/82 - 141/82)  BP(mean): --  RR: 18 (01 Oct 2017 22:39) (16 - 19)  SpO2: 95% (01 Oct 2017 22:39) (93% - 95%)    PHYSICAL EXAM:      Constitutional: A&Ox3    Breasts:    Respiratory: non labored breathing, no respiratory distress    Cardiovascular: NSR, RRR    Gastrointestinal:                 Incision:    Genitourinary:    Extremities: (-) edema                  I&O's Detail    30 Sep 2017 07:01  -  01 Oct 2017 07:00  --------------------------------------------------------  IN:    Fat Emulsion 20%: 208 mL    TPN (Total Parenteral Nutrition): 768 mL  Total IN: 976 mL    OUT:    VAC (Vacuum Assisted Closure) System: 600 mL  Total OUT: 600 mL    Total NET: 376 mL      01 Oct 2017 07:01  -  02 Oct 2017 05:19  --------------------------------------------------------  IN:    Solution: 100 mL    TPN (Total Parenteral Nutrition): 1152 mL  Total IN: 1252 mL    OUT:    VAC (Vacuum Assisted Closure) System: 65 mL    Voided: 1000 mL  Total OUT: 1065 mL    Total NET: 187 mL          LABS:    10-01    137  |  99  |  27<H>  ----------------------------<  158<H>  4.1   |  24  |  0.50    Ca    9.9      01 Oct 2017 06:15  Phos  2.7     10-01  Mg     1.8     10-01            RADIOLOGY & ADDITIONAL STUDIES: INTERVAL HPI/OVERNIGHT EVENTS: No acute events, Tm 100.6-tylenol given    SUBJECTIVE:  Flatus: [ ] YES [ ] NO             Bowel Movement: [ ] YES [ ] NO  Pain (0-10):            Pain Control Adequate: [ ] YES [ ] NO  Nausea: [ ] YES [ ] NO            Vomiting: [ ] YES [ ] NO  Diarrhea: [ ] YES [ ] NO         Constipation: [ ] YES [ ] NO     Chest Pain: [ ] YES [ ] NO    SOB:  [ ] YES [ ] NO    MEDICATIONS  (STANDING):  insulin lispro (HumaLOG) corrective regimen sliding scale   SubCutaneous every 6 hours  dextrose 5%. 1000 milliLiter(s) (50 mL/Hr) IV Continuous <Continuous>  dextrose 50% Injectable 25 Gram(s) IV Push once  sodium chloride 0.9% lock flush 20 milliLiter(s) IV Push once  cyanocobalamin Injectable 1000 MICROGram(s) SubCutaneous every 7 days  dextrose 50% Injectable 12.5 Gram(s) IV Push once  pantoprazole  Injectable 40 milliGRAM(s) IV Push daily  calcitriol Injectable 1 MICROGram(s) IV Push <User Schedule>  collagenase Ointment 1 Application(s) Topical daily  escitalopram 20 milliGRAM(s) Oral daily  losartan 50 milliGRAM(s) Oral daily  diphenhydrAMINE   Injectable 50 milliGRAM(s) IV Push at bedtime  enoxaparin Injectable 30 milliGRAM(s) SubCutaneous two times a day  heparin  flush 100 Units/mL Injectable 100 Unit(s) IV Push every other day  nystatin Powder 1 Application(s) Topical two times a day  diazepam    Tablet 5 milliGRAM(s) Oral at bedtime  HYDROmorphone  Injectable 0.25 milliGRAM(s) IV Push daily  Parenteral Nutrition - Adult 1 Each (64 mL/Hr) TPN Continuous <Continuous>    MEDICATIONS  (PRN):  sodium chloride 0.9% lock flush 10 milliLiter(s) IV Push every 1 hour PRN After each medication administration  sodium chloride 0.9% lock flush 10 milliLiter(s) IV Push every 12 hours PRN Lumen of catheter NOT used      Vital Signs Last 24 Hrs  T(C): 37.2 (01 Oct 2017 22:39), Max: 38.1 (01 Oct 2017 21:02)  T(F): 99 (01 Oct 2017 22:39), Max: 100.6 (01 Oct 2017 21:02)  HR: 89 (01 Oct 2017 22:39) (89 - 113)  BP: 124/70 (01 Oct 2017 22:39) (120/82 - 141/82)  BP(mean): --  RR: 18 (01 Oct 2017 22:39) (16 - 19)  SpO2: 95% (01 Oct 2017 22:39) (93% - 95%)    PHYSICAL EXAM:      Constitutional: A&Ox3    Breasts:    Respiratory: non labored breathing, no respiratory distress    Cardiovascular: NSR, RRR    Gastrointestinal:                 Incision:    Genitourinary:    Extremities: (-) edema                  I&O's Detail    30 Sep 2017 07:01  -  01 Oct 2017 07:00  --------------------------------------------------------  IN:    Fat Emulsion 20%: 208 mL    TPN (Total Parenteral Nutrition): 768 mL  Total IN: 976 mL    OUT:    VAC (Vacuum Assisted Closure) System: 600 mL  Total OUT: 600 mL    Total NET: 376 mL      01 Oct 2017 07:01  -  02 Oct 2017 05:19  --------------------------------------------------------  IN:    Solution: 100 mL    TPN (Total Parenteral Nutrition): 1152 mL  Total IN: 1252 mL    OUT:    VAC (Vacuum Assisted Closure) System: 65 mL    Voided: 1000 mL  Total OUT: 1065 mL    Total NET: 187 mL          LABS:    10-01    137  |  99  |  27<H>  ----------------------------<  158<H>  4.1   |  24  |  0.50    Ca    9.9      01 Oct 2017 06:15  Phos  2.7     10-01  Mg     1.8     10-01            RADIOLOGY & ADDITIONAL STUDIES: INTERVAL HPI/OVERNIGHT EVENTS: No acute events, Tm 100.6-tylenol given    SUBJECTIVE: No active complains, requested to change the VAC soon.    MEDICATIONS  (STANDING):  insulin lispro (HumaLOG) corrective regimen sliding scale   SubCutaneous every 6 hours  dextrose 5%. 1000 milliLiter(s) (50 mL/Hr) IV Continuous <Continuous>  dextrose 50% Injectable 25 Gram(s) IV Push once  sodium chloride 0.9% lock flush 20 milliLiter(s) IV Push once  cyanocobalamin Injectable 1000 MICROGram(s) SubCutaneous every 7 days  dextrose 50% Injectable 12.5 Gram(s) IV Push once  pantoprazole  Injectable 40 milliGRAM(s) IV Push daily  calcitriol Injectable 1 MICROGram(s) IV Push <User Schedule>  collagenase Ointment 1 Application(s) Topical daily  escitalopram 20 milliGRAM(s) Oral daily  losartan 50 milliGRAM(s) Oral daily  diphenhydrAMINE   Injectable 50 milliGRAM(s) IV Push at bedtime  enoxaparin Injectable 30 milliGRAM(s) SubCutaneous two times a day  heparin  flush 100 Units/mL Injectable 100 Unit(s) IV Push every other day  nystatin Powder 1 Application(s) Topical two times a day  diazepam    Tablet 5 milliGRAM(s) Oral at bedtime  HYDROmorphone  Injectable 0.25 milliGRAM(s) IV Push daily  Parenteral Nutrition - Adult 1 Each (64 mL/Hr) TPN Continuous <Continuous>    MEDICATIONS  (PRN):  sodium chloride 0.9% lock flush 10 milliLiter(s) IV Push every 1 hour PRN After each medication administration  sodium chloride 0.9% lock flush 10 milliLiter(s) IV Push every 12 hours PRN Lumen of catheter NOT used      Vital Signs Last 24 Hrs  T(C): 37.2 (01 Oct 2017 22:39), Max: 38.1 (01 Oct 2017 21:02)  T(F): 99 (01 Oct 2017 22:39), Max: 100.6 (01 Oct 2017 21:02)  HR: 89 (01 Oct 2017 22:39) (89 - 113)  BP: 124/70 (01 Oct 2017 22:39) (120/82 - 141/82)  BP(mean): --  RR: 18 (01 Oct 2017 22:39) (16 - 19)  SpO2: 95% (01 Oct 2017 22:39) (93% - 95%)    PHYSICAL EXAM:      Constitutional: A&Ox3      Respiratory: non labored breathing, no respiratory distress    Cardiovascular: NSR, RRR    Gastrointestinal: VAC is intact, non tender, non distended, soft.              I&O's Detail    30 Sep 2017 07:01  -  01 Oct 2017 07:00  --------------------------------------------------------  IN:    Fat Emulsion 20%: 208 mL    TPN (Total Parenteral Nutrition): 768 mL  Total IN: 976 mL    OUT:    VAC (Vacuum Assisted Closure) System: 600 mL  Total OUT: 600 mL    Total NET: 376 mL      01 Oct 2017 07:01  -  02 Oct 2017 05:19  --------------------------------------------------------  IN:    Solution: 100 mL    TPN (Total Parenteral Nutrition): 1152 mL  Total IN: 1252 mL    OUT:    VAC (Vacuum Assisted Closure) System: 65 mL    Voided: 1000 mL  Total OUT: 1065 mL    Total NET: 187 mL          LABS:    10-01    137  |  99  |  27<H>  ----------------------------<  158<H>  4.1   |  24  |  0.50    Ca    9.9      01 Oct 2017 06:15  Phos  2.7     10-01  Mg     1.8     10-01            RADIOLOGY & ADDITIONAL STUDIES:

## 2017-10-02 NOTE — PROVIDER CONTACT NOTE (CHANGE IN STATUS NOTIFICATION) - BACKGROUND
Pt admitted with leaking fistulas. Pt on TPN.
Pt with leaking abdominal fistula. Abdomen with wound vac and CHECO.
Pt admitted with leaking fistula. Abdominal wound vac in place.

## 2017-10-02 NOTE — PROGRESS NOTE ADULT - ASSESSMENT
57 F s/p  SBR, fistula resection, esophagojejunostomy revision w/ post-op course complicated by RTOR for distal anastomosis breakdown, ATN, acute respiratory failure, & septic shock.     -NPO except meds/TPN/IVF  -Activity as tolerated  -Pain/nausea control  -ISS  -L IJ  -Nystatin powder  -SCDs, lovenox, OOBA  -Malecot drain in enterotomy site  -wound vac to midline  . 57 F s/p  SBR, fistula resection, esophagojejunostomy revision w/ post-op course complicated by RTOR for distal anastomosis breakdown, ATN, acute respiratory failure, & septic shock.     -NPO except meds/TPN/IVF  -Activity as tolerated  -Pain/nausea control  -ISS  -L IJ  -Nystatin powder  -SCDs, lovenox, OOBA  -Malecot drain in enterotomy site  -wound vac to midline change today  .

## 2017-10-02 NOTE — PROGRESS NOTE ADULT - SUBJECTIVE AND OBJECTIVE BOX
patient remains stable calling me about eating and especially narcotics  lengthy conversations with   issue is that there are now two more fistulas from dressing changes and exposed augie;  very difficult to control  will need reconstructive surgery question is of timing and when will have window to actually resect and do something constructive  I have asked for Dr Madhav fitzgerald my former colleague to come evaluate from [facility] to get any suggestion  discused with dr aquino as well  as this point will continue with vac and try to cover with vaseline and adaptic and hope that can control  discouraged po and despite explanation all she is fixated on is food and drugs  I understand the difficult emotional state she is in which is reasonable and  is incredibly supportive

## 2017-10-03 LAB
ANION GAP SERPL CALC-SCNC: 16 MMOL/L — SIGNIFICANT CHANGE UP (ref 5–17)
BUN SERPL-MCNC: 24 MG/DL — HIGH (ref 7–23)
CALCIUM SERPL-MCNC: 9.6 MG/DL — SIGNIFICANT CHANGE UP (ref 8.4–10.5)
CHLORIDE SERPL-SCNC: 95 MMOL/L — LOW (ref 96–108)
CO2 SERPL-SCNC: 24 MMOL/L — SIGNIFICANT CHANGE UP (ref 22–31)
CREAT SERPL-MCNC: 0.6 MG/DL — SIGNIFICANT CHANGE UP (ref 0.5–1.3)
GLUCOSE SERPL-MCNC: 180 MG/DL — HIGH (ref 70–99)
HCT VFR BLD CALC: 35.9 % — SIGNIFICANT CHANGE UP (ref 34.5–45)
HGB BLD-MCNC: 11.6 G/DL — SIGNIFICANT CHANGE UP (ref 11.5–15.5)
MAGNESIUM SERPL-MCNC: 1.8 MG/DL — SIGNIFICANT CHANGE UP (ref 1.6–2.6)
MCHC RBC-ENTMCNC: 29.7 PG — SIGNIFICANT CHANGE UP (ref 27–34)
MCHC RBC-ENTMCNC: 32.3 G/DL — SIGNIFICANT CHANGE UP (ref 32–36)
MCV RBC AUTO: 92.1 FL — SIGNIFICANT CHANGE UP (ref 80–100)
PHOSPHATE SERPL-MCNC: 2.4 MG/DL — LOW (ref 2.5–4.5)
PLATELET # BLD AUTO: 208 K/UL — SIGNIFICANT CHANGE UP (ref 150–400)
POTASSIUM SERPL-MCNC: 4 MMOL/L — SIGNIFICANT CHANGE UP (ref 3.5–5.3)
POTASSIUM SERPL-SCNC: 4 MMOL/L — SIGNIFICANT CHANGE UP (ref 3.5–5.3)
RBC # BLD: 3.9 M/UL — SIGNIFICANT CHANGE UP (ref 3.8–5.2)
RBC # FLD: 17.4 % — HIGH (ref 10.3–16.9)
SODIUM SERPL-SCNC: 135 MMOL/L — SIGNIFICANT CHANGE UP (ref 135–145)
WBC # BLD: 7.6 K/UL — SIGNIFICANT CHANGE UP (ref 3.8–10.5)
WBC # FLD AUTO: 7.6 K/UL — SIGNIFICANT CHANGE UP (ref 3.8–10.5)

## 2017-10-03 PROCEDURE — 74177 CT ABD & PELVIS W/CONTRAST: CPT | Mod: 26

## 2017-10-03 PROCEDURE — 71260 CT THORAX DX C+: CPT | Mod: 26

## 2017-10-03 PROCEDURE — 71010: CPT | Mod: 26

## 2017-10-03 RX ORDER — HYDROMORPHONE HYDROCHLORIDE 2 MG/ML
0.25 INJECTION INTRAMUSCULAR; INTRAVENOUS; SUBCUTANEOUS ONCE
Qty: 0 | Refills: 0 | Status: DISCONTINUED | OUTPATIENT
Start: 2017-10-03 | End: 2017-10-03

## 2017-10-03 RX ORDER — ELECTROLYTE SOLUTION,INJ
1 VIAL (ML) INTRAVENOUS
Qty: 0 | Refills: 0 | Status: DISCONTINUED | OUTPATIENT
Start: 2017-10-03 | End: 2017-10-03

## 2017-10-03 RX ORDER — ACETAMINOPHEN 500 MG
1000 TABLET ORAL ONCE
Qty: 0 | Refills: 0 | Status: COMPLETED | OUTPATIENT
Start: 2017-10-03 | End: 2017-10-03

## 2017-10-03 RX ORDER — DIAZEPAM 5 MG
5 TABLET ORAL AT BEDTIME
Qty: 0 | Refills: 0 | Status: DISCONTINUED | OUTPATIENT
Start: 2017-10-04 | End: 2017-10-11

## 2017-10-03 RX ORDER — ACETAMINOPHEN 500 MG
1000 TABLET ORAL ONCE
Qty: 0 | Refills: 0 | Status: COMPLETED | OUTPATIENT
Start: 2017-10-03 | End: 2017-10-04

## 2017-10-03 RX ORDER — DIATRIZOATE MEGLUMINE 180 MG/ML
30 INJECTION, SOLUTION INTRAVESICAL ONCE
Qty: 0 | Refills: 0 | Status: COMPLETED | OUTPATIENT
Start: 2017-10-03 | End: 2017-10-03

## 2017-10-03 RX ORDER — MAGNESIUM SULFATE 500 MG/ML
2 VIAL (ML) INJECTION ONCE
Qty: 0 | Refills: 0 | Status: COMPLETED | OUTPATIENT
Start: 2017-10-03 | End: 2017-10-03

## 2017-10-03 RX ADMIN — Medication 1 APPLICATION(S): at 13:09

## 2017-10-03 RX ADMIN — NYSTATIN CREAM 1 APPLICATION(S): 100000 CREAM TOPICAL at 18:15

## 2017-10-03 RX ADMIN — HYDROMORPHONE HYDROCHLORIDE 0.25 MILLIGRAM(S): 2 INJECTION INTRAMUSCULAR; INTRAVENOUS; SUBCUTANEOUS at 19:33

## 2017-10-03 RX ADMIN — Medication 64 EACH: at 19:12

## 2017-10-03 RX ADMIN — DIATRIZOATE MEGLUMINE 30 MILLILITER(S): 180 INJECTION, SOLUTION INTRAVESICAL at 09:15

## 2017-10-03 RX ADMIN — HYDROMORPHONE HYDROCHLORIDE 0.25 MILLIGRAM(S): 2 INJECTION INTRAMUSCULAR; INTRAVENOUS; SUBCUTANEOUS at 13:03

## 2017-10-03 RX ADMIN — Medication 1000 MILLIGRAM(S): at 01:00

## 2017-10-03 RX ADMIN — ENOXAPARIN SODIUM 30 MILLIGRAM(S): 100 INJECTION SUBCUTANEOUS at 05:50

## 2017-10-03 RX ADMIN — Medication 400 MILLIGRAM(S): at 00:45

## 2017-10-03 RX ADMIN — Medication 400 MILLIGRAM(S): at 10:53

## 2017-10-03 RX ADMIN — ESCITALOPRAM OXALATE 20 MILLIGRAM(S): 10 TABLET, FILM COATED ORAL at 23:48

## 2017-10-03 RX ADMIN — Medication 5 MILLIGRAM(S): at 21:23

## 2017-10-03 RX ADMIN — NYSTATIN CREAM 1 APPLICATION(S): 100000 CREAM TOPICAL at 05:51

## 2017-10-03 RX ADMIN — ENOXAPARIN SODIUM 30 MILLIGRAM(S): 100 INJECTION SUBCUTANEOUS at 18:25

## 2017-10-03 RX ADMIN — Medication 1000 MILLIGRAM(S): at 11:45

## 2017-10-03 RX ADMIN — Medication 50 MILLIGRAM(S): at 22:13

## 2017-10-03 RX ADMIN — LOSARTAN POTASSIUM 50 MILLIGRAM(S): 100 TABLET, FILM COATED ORAL at 05:50

## 2017-10-03 RX ADMIN — Medication 63.75 MILLIMOLE(S): at 17:00

## 2017-10-03 RX ADMIN — PANTOPRAZOLE SODIUM 40 MILLIGRAM(S): 20 TABLET, DELAYED RELEASE ORAL at 13:03

## 2017-10-03 RX ADMIN — HYDROMORPHONE HYDROCHLORIDE 0.25 MILLIGRAM(S): 2 INJECTION INTRAMUSCULAR; INTRAVENOUS; SUBCUTANEOUS at 13:33

## 2017-10-03 RX ADMIN — Medication 50 GRAM(S): at 13:09

## 2017-10-03 NOTE — PROGRESS NOTE ADULT - SUBJECTIVE AND OBJECTIVE BOX
O/N: BRAYDEN, vac opened up-reinforced w/ tape.  10/2 : VAC changed, new fistula developed, feces leaked, patient is going to be strict NPO. O/N: BRAYDEN, vac opened up-reinforced w/ tape.  10/2 : VAC changed, new fistula developed, feces leaked, patient is going to be strict NPO.    SUBJECTIVE: Patient seen and examined bedside by chief resident. No leak, explained that she needs to be strict NPO. understood. no pain, no nausea/vomiting    losartan 50 milliGRAM(s) Oral daily  enoxaparin Injectable 30 milliGRAM(s) SubCutaneous two times a day  heparin  flush 100 Units/mL Injectable 100 Unit(s) IV Push every other day      Vital Signs Last 24 Hrs  T(C): 37.9 (03 Oct 2017 05:34), Max: 37.9 (03 Oct 2017 05:34)  T(F): 100.2 (03 Oct 2017 05:34), Max: 100.2 (03 Oct 2017 05:34)  HR: 101 (03 Oct 2017 05:34) (90 - 101)  BP: 147/104 (03 Oct 2017 05:34) (108/73 - 147/104)  BP(mean): --  RR: 16 (03 Oct 2017 05:34) (16 - 18)  SpO2: 95% (03 Oct 2017 05:34) (94% - 97%)  I&O's Detail    01 Oct 2017 07:01  -  02 Oct 2017 07:00  --------------------------------------------------------  IN:    Solution: 100 mL    TPN (Total Parenteral Nutrition): 1408 mL  Total IN: 1508 mL    OUT:    VAC (Vacuum Assisted Closure) System: 150 mL    Voided: 1000 mL  Total OUT: 1150 mL    Total NET: 358 mL      02 Oct 2017 07:01  -  03 Oct 2017 06:56  --------------------------------------------------------  IN:    Fat Emulsion 20%: 104 mL    Oral Fluid: 60 mL    TPN (Total Parenteral Nutrition): 320 mL  Total IN: 484 mL    OUT:    Colostomy: 100 mL    VAC (Vacuum Assisted Closure) System: 500 mL    Voided: 400 mL  Total OUT: 1000 mL    Total NET: -516 mL          General: NAD, resting comfortably in bed  C/V: NSR  Pulm: Nonlabored breathing, no respiratory distress  Abd: soft, non tender, non distended, wound VAC is intact, no leak.   Extrem: WWP, no edema, SCDs in place        LABS:                        11.3   6.1   )-----------( 238      ( 02 Oct 2017 06:13 )             35.8     10-02    136  |  100  |  30<H>  ----------------------------<  162<H>  4.3   |  25  |  0.49<L>    Ca    10.0      02 Oct 2017 06:13  Phos  3.4     10-02  Mg     2.1     10-02            RADIOLOGY & ADDITIONAL STUDIES:

## 2017-10-03 NOTE — PROGRESS NOTE ADULT - SUBJECTIVE AND OBJECTIVE BOX
patient lying in bed  long converstiaon about importance of being out of bed which is futile  as while very sweet very manipulative and goal is to be in bed sleeping  dressing in place with discharge  discussed with dr fitzgerald who is going to see today and will get ct scan  will need surgery problem is timing and her overall condition to improve  as is medically stable but does not use any muscles to help herself

## 2017-10-03 NOTE — PROGRESS NOTE ADULT - ASSESSMENT
57 F s/p  SBR, fistula resection, esophagojejunostomy revision w/ post-op course complicated by RTOR for distal anastomosis breakdown, ATN, acute respiratory failure, & septic shock. Resolved..     NPO/TPN/IVF  Pain/nausea control - only benadryl at midnight and dilaudid during VAC change  ISS  L IJ  Nystatin powder  SCDs, lovenox, OOBA  Drains: Malecot in enterotomy site, wound vac to midline  Wounds: Midline xiphoid to pubis incision; DTI & stage 2 pressure ulcer noted.

## 2017-10-04 LAB
GRAM STN FLD: SIGNIFICANT CHANGE UP
METHOD TYPE: SIGNIFICANT CHANGE UP
MRSA SPEC QL CULT: SIGNIFICANT CHANGE UP

## 2017-10-04 PROCEDURE — 71010: CPT | Mod: 26

## 2017-10-04 RX ORDER — HYDROMORPHONE HYDROCHLORIDE 2 MG/ML
0.25 INJECTION INTRAMUSCULAR; INTRAVENOUS; SUBCUTANEOUS DAILY
Qty: 0 | Refills: 0 | Status: DISCONTINUED | OUTPATIENT
Start: 2017-10-05 | End: 2017-10-12

## 2017-10-04 RX ORDER — ELECTROLYTE SOLUTION,INJ
1 VIAL (ML) INTRAVENOUS
Qty: 0 | Refills: 0 | Status: DISCONTINUED | OUTPATIENT
Start: 2017-10-04 | End: 2017-10-04

## 2017-10-04 RX ORDER — HYDROMORPHONE HYDROCHLORIDE 2 MG/ML
0.5 INJECTION INTRAMUSCULAR; INTRAVENOUS; SUBCUTANEOUS ONCE
Qty: 0 | Refills: 0 | Status: DISCONTINUED | OUTPATIENT
Start: 2017-10-04 | End: 2017-10-04

## 2017-10-04 RX ORDER — VANCOMYCIN HCL 1 G
1000 VIAL (EA) INTRAVENOUS ONCE
Qty: 0 | Refills: 0 | Status: COMPLETED | OUTPATIENT
Start: 2017-10-04 | End: 2017-10-04

## 2017-10-04 RX ORDER — I.V. FAT EMULSION 20 G/100ML
50 EMULSION INTRAVENOUS ONCE
Qty: 0 | Refills: 0 | Status: COMPLETED | OUTPATIENT
Start: 2017-10-04 | End: 2017-10-04

## 2017-10-04 RX ORDER — VANCOMYCIN HCL 1 G
1500 VIAL (EA) INTRAVENOUS EVERY 12 HOURS
Qty: 0 | Refills: 0 | Status: DISCONTINUED | OUTPATIENT
Start: 2017-10-04 | End: 2017-10-08

## 2017-10-04 RX ADMIN — Medication 1000 MILLIGRAM(S): at 01:15

## 2017-10-04 RX ADMIN — Medication 2: at 05:56

## 2017-10-04 RX ADMIN — HYDROMORPHONE HYDROCHLORIDE 0.5 MILLIGRAM(S): 2 INJECTION INTRAMUSCULAR; INTRAVENOUS; SUBCUTANEOUS at 16:00

## 2017-10-04 RX ADMIN — CALCITRIOL 1 MICROGRAM(S): 0.5 CAPSULE ORAL at 11:25

## 2017-10-04 RX ADMIN — ENOXAPARIN SODIUM 30 MILLIGRAM(S): 100 INJECTION SUBCUTANEOUS at 19:04

## 2017-10-04 RX ADMIN — PREGABALIN 1000 MICROGRAM(S): 225 CAPSULE ORAL at 11:25

## 2017-10-04 RX ADMIN — Medication 100 UNIT(S): at 11:24

## 2017-10-04 RX ADMIN — ENOXAPARIN SODIUM 30 MILLIGRAM(S): 100 INJECTION SUBCUTANEOUS at 05:56

## 2017-10-04 RX ADMIN — ESCITALOPRAM OXALATE 20 MILLIGRAM(S): 10 TABLET, FILM COATED ORAL at 21:12

## 2017-10-04 RX ADMIN — NYSTATIN CREAM 1 APPLICATION(S): 100000 CREAM TOPICAL at 05:56

## 2017-10-04 RX ADMIN — HYDROMORPHONE HYDROCHLORIDE 0.25 MILLIGRAM(S): 2 INJECTION INTRAMUSCULAR; INTRAVENOUS; SUBCUTANEOUS at 11:25

## 2017-10-04 RX ADMIN — Medication 2: at 00:53

## 2017-10-04 RX ADMIN — NYSTATIN CREAM 1 APPLICATION(S): 100000 CREAM TOPICAL at 19:01

## 2017-10-04 RX ADMIN — I.V. FAT EMULSION 20.83 GRAM(S): 20 EMULSION INTRAVENOUS at 22:39

## 2017-10-04 RX ADMIN — Medication 400 MILLIGRAM(S): at 00:45

## 2017-10-04 RX ADMIN — Medication 50 MILLIGRAM(S): at 21:18

## 2017-10-04 RX ADMIN — PANTOPRAZOLE SODIUM 40 MILLIGRAM(S): 20 TABLET, DELAYED RELEASE ORAL at 11:25

## 2017-10-04 RX ADMIN — HYDROMORPHONE HYDROCHLORIDE 0.25 MILLIGRAM(S): 2 INJECTION INTRAMUSCULAR; INTRAVENOUS; SUBCUTANEOUS at 12:00

## 2017-10-04 RX ADMIN — LOSARTAN POTASSIUM 50 MILLIGRAM(S): 100 TABLET, FILM COATED ORAL at 05:56

## 2017-10-04 RX ADMIN — Medication 5 MILLIGRAM(S): at 21:12

## 2017-10-04 RX ADMIN — Medication 4: at 13:38

## 2017-10-04 RX ADMIN — HYDROMORPHONE HYDROCHLORIDE 0.5 MILLIGRAM(S): 2 INJECTION INTRAMUSCULAR; INTRAVENOUS; SUBCUTANEOUS at 15:36

## 2017-10-04 RX ADMIN — Medication 1 EACH: at 18:50

## 2017-10-04 RX ADMIN — Medication 250 MILLIGRAM(S): at 11:26

## 2017-10-04 NOTE — PROGRESS NOTE ADULT - ASSESSMENT
57 F s/p  SBR, fistula resection, esophagojejunostomy revision w/ post-op course complicated by RTOR for distal anastomosis breakdown, ATN, acute respiratory failure, & septic shock. Resolved..     NPO/TPN/IVF  Pain/nausea control - only benadryl at midnight and dilaudid during VAC change  ISS  L IJ  Nystatin powder  SCDs, lovenox, OOBA  Drains: Malecot in enterotomy site, wound vac to midline  Wounds: Midline xiphoid to pubis incision; DTI & stage 2 pressure ulcer noted. 57 F s/p  SBR, fistula resection, esophagojejunostomy revision w/ post-op course complicated by RTOR for distal anastomosis breakdown, ATN, acute respiratory failure, & septic shock. Resolved. Now presented     NPO/TPN/IVF  Pain/nausea control - only benadryl at midnight and dilaudid during VAC change  ISS  L IJ  Nystatin powder  SCDs, lovenox, OOBA  Drains: Malecot in enterotomy site, wound vac to midline  Wounds: Midline xiphoid to pubis incision; DTI & stage 2 pressure ulcer noted. 57 F s/p  SBR, fistula resection, esophagojejunostomy revision w/ post-op course complicated by RTOR for distal anastomosis breakdown, ATN, acute respiratory failure, & septic shock. Resolved. Now presented with bacteremia (latest blood culture grew MRSA), new fistula formation (3 fistulas), with fever.    NPO/TPN/IVF  Pain/nausea control - only benadryl at midnight and dilaudid during VAC change  ISS  L IJ  Nystatin powder  SCDs, lovenox, OOBA  Wound VAC M/W/F  Start on IV vancomycin  Need new IV line, for  to put in new line today.

## 2017-10-04 NOTE — PROGRESS NOTE ADULT - SUBJECTIVE AND OBJECTIVE BOX
O/N: Vac changed along side Dr. Ahn, [id number] gave Tylenol O/N: Vac changed along side Dr. Ahn, [id number] gave Tylenol       SUBJECTIVE: Patient seen and examined bedside by chief resident. Culture grew MRSA. Patient has no active complains     enoxaparin Injectable 30 milliGRAM(s) SubCutaneous two times a day  heparin  flush 100 Units/mL Injectable 100 Unit(s) IV Push every other day  losartan 50 milliGRAM(s) Oral daily  vancomycin  IVPB 1000 milliGRAM(s) IV Intermittent once      Vital Signs Last 24 Hrs  T(C): 37.3 (04 Oct 2017 08:47), Max: 39.2 (04 Oct 2017 00:13)  T(F): 99.2 (04 Oct 2017 08:47), Max: 102.5 (04 Oct 2017 00:13)  HR: 111 (04 Oct 2017 08:47) (101 - 117)  BP: 140/83 (04 Oct 2017 08:47) (101/64 - 140/83)  BP(mean): --  RR: 17 (04 Oct 2017 08:47) (16 - 17)  SpO2: 93% (04 Oct 2017 08:47) (93% - 94%)  I&O's Detail    03 Oct 2017 07:01  -  04 Oct 2017 07:00  --------------------------------------------------------  IN:    TPN (Total Parenteral Nutrition): 768 mL  Total IN: 768 mL    OUT:    VAC (Vacuum Assisted Closure) System: 250 mL  Total OUT: 250 mL    Total NET: 518 mL          General: NAD, resting comfortably in bed  C/V: NSR  Pulm: Nonlabored breathing, no respiratory distress  Abd: soft,   Extrem: WWP, no edema, SCDs in place        LABS:                        11.6   7.6   )-----------( 208      ( 03 Oct 2017 10:52 )             35.9     10-03    135  |  95<L>  |  24<H>  ----------------------------<  180<H>  4.0   |  24  |  0.60    Ca    9.6      03 Oct 2017 10:52  Phos  2.4     10-03  Mg     1.8     10-03            RADIOLOGY & ADDITIONAL STUDIES: O/N: Vac changed along side Dr. Ahn, [id number] gave Tylenol       SUBJECTIVE: Patient seen and examined bedside by chief resident. Culture grew MRSA. Patient has no active complains     enoxaparin Injectable 30 milliGRAM(s) SubCutaneous two times a day  heparin  flush 100 Units/mL Injectable 100 Unit(s) IV Push every other day  losartan 50 milliGRAM(s) Oral daily  vancomycin  IVPB 1000 milliGRAM(s) IV Intermittent once      Vital Signs Last 24 Hrs  T(C): 37.3 (04 Oct 2017 08:47), Max: 39.2 (04 Oct 2017 00:13)  T(F): 99.2 (04 Oct 2017 08:47), Max: 102.5 (04 Oct 2017 00:13)  HR: 111 (04 Oct 2017 08:47) (101 - 117)  BP: 140/83 (04 Oct 2017 08:47) (101/64 - 140/83)  BP(mean): --  RR: 17 (04 Oct 2017 08:47) (16 - 17)  SpO2: 93% (04 Oct 2017 08:47) (93% - 94%)  I&O's Detail    03 Oct 2017 07:01  -  04 Oct 2017 07:00  --------------------------------------------------------  IN:    TPN (Total Parenteral Nutrition): 768 mL  Total IN: 768 mL    OUT:    VAC (Vacuum Assisted Closure) System: 250 mL  Total OUT: 250 mL    Total NET: 518 mL          General: NAD, resting comfortably in bed  C/V: NSR  Pulm: Nonlabored breathing, no respiratory distress  Abd: soft, non tender, wound VAC clean, intact and no leak  Extrem: WWP, no edema, SCDs in place        LABS:                        11.6   7.6   )-----------( 208      ( 03 Oct 2017 10:52 )             35.9     10-03    135  |  95<L>  |  24<H>  ----------------------------<  180<H>  4.0   |  24  |  0.60    Ca    9.6      03 Oct 2017 10:52  Phos  2.4     10-03  Mg     1.8     10-03            RADIOLOGY & ADDITIONAL STUDIES:

## 2017-10-04 NOTE — PROGRESS NOTE ADULT - SUBJECTIVE AND OBJECTIVE BOX
On the basis of a positive blood culture for bacteremia, Dr Brady requested a new central venous catheter to replace the left IJ catheter. On u/s exam, the left   subclavian vein is suitable. The procedure was explained for consent.

## 2017-10-05 LAB
ANION GAP SERPL CALC-SCNC: 13 MMOL/L — SIGNIFICANT CHANGE UP (ref 5–17)
ANION GAP SERPL CALC-SCNC: 14 MMOL/L — SIGNIFICANT CHANGE UP (ref 5–17)
BUN SERPL-MCNC: 28 MG/DL — HIGH (ref 7–23)
BUN SERPL-MCNC: 36 MG/DL — HIGH (ref 7–23)
CALCIUM SERPL-MCNC: 8.9 MG/DL — SIGNIFICANT CHANGE UP (ref 8.4–10.5)
CALCIUM SERPL-MCNC: 8.9 MG/DL — SIGNIFICANT CHANGE UP (ref 8.4–10.5)
CHLORIDE SERPL-SCNC: 96 MMOL/L — SIGNIFICANT CHANGE UP (ref 96–108)
CHLORIDE SERPL-SCNC: 97 MMOL/L — SIGNIFICANT CHANGE UP (ref 96–108)
CO2 SERPL-SCNC: 25 MMOL/L — SIGNIFICANT CHANGE UP (ref 22–31)
CO2 SERPL-SCNC: 25 MMOL/L — SIGNIFICANT CHANGE UP (ref 22–31)
CREAT SERPL-MCNC: 0.49 MG/DL — LOW (ref 0.5–1.3)
CREAT SERPL-MCNC: 0.65 MG/DL — SIGNIFICANT CHANGE UP (ref 0.5–1.3)
GLUCOSE SERPL-MCNC: 132 MG/DL — HIGH (ref 70–99)
GLUCOSE SERPL-MCNC: 161 MG/DL — HIGH (ref 70–99)
HCT VFR BLD CALC: 29.5 % — LOW (ref 34.5–45)
HCT VFR BLD CALC: 29.5 % — LOW (ref 34.5–45)
HGB BLD-MCNC: 9.3 G/DL — LOW (ref 11.5–15.5)
HGB BLD-MCNC: 9.6 G/DL — LOW (ref 11.5–15.5)
MAGNESIUM SERPL-MCNC: 2.1 MG/DL — SIGNIFICANT CHANGE UP (ref 1.6–2.6)
MAGNESIUM SERPL-MCNC: 2.4 MG/DL — SIGNIFICANT CHANGE UP (ref 1.6–2.6)
MCHC RBC-ENTMCNC: 29.3 PG — SIGNIFICANT CHANGE UP (ref 27–34)
MCHC RBC-ENTMCNC: 29.6 PG — SIGNIFICANT CHANGE UP (ref 27–34)
MCHC RBC-ENTMCNC: 31.5 G/DL — LOW (ref 32–36)
MCHC RBC-ENTMCNC: 32.5 G/DL — SIGNIFICANT CHANGE UP (ref 32–36)
MCV RBC AUTO: 91 FL — SIGNIFICANT CHANGE UP (ref 80–100)
MCV RBC AUTO: 93.1 FL — SIGNIFICANT CHANGE UP (ref 80–100)
PHOSPHATE SERPL-MCNC: 2 MG/DL — LOW (ref 2.5–4.5)
PHOSPHATE SERPL-MCNC: 2.3 MG/DL — LOW (ref 2.5–4.5)
PLATELET # BLD AUTO: 137 K/UL — LOW (ref 150–400)
PLATELET # BLD AUTO: 165 K/UL — SIGNIFICANT CHANGE UP (ref 150–400)
POTASSIUM SERPL-MCNC: 3.4 MMOL/L — LOW (ref 3.5–5.3)
POTASSIUM SERPL-MCNC: 3.5 MMOL/L — SIGNIFICANT CHANGE UP (ref 3.5–5.3)
POTASSIUM SERPL-SCNC: 3.4 MMOL/L — LOW (ref 3.5–5.3)
POTASSIUM SERPL-SCNC: 3.5 MMOL/L — SIGNIFICANT CHANGE UP (ref 3.5–5.3)
RBC # BLD: 3.17 M/UL — LOW (ref 3.8–5.2)
RBC # BLD: 3.24 M/UL — LOW (ref 3.8–5.2)
RBC # FLD: 17.4 % — HIGH (ref 10.3–16.9)
RBC # FLD: 17.9 % — HIGH (ref 10.3–16.9)
SODIUM SERPL-SCNC: 134 MMOL/L — LOW (ref 135–145)
SODIUM SERPL-SCNC: 136 MMOL/L — SIGNIFICANT CHANGE UP (ref 135–145)
VANCOMYCIN TROUGH SERPL-MCNC: 21.4 UG/ML — HIGH (ref 10–20)
WBC # BLD: 5.4 K/UL — SIGNIFICANT CHANGE UP (ref 3.8–10.5)
WBC # BLD: 7 K/UL — SIGNIFICANT CHANGE UP (ref 3.8–10.5)
WBC # FLD AUTO: 5.4 K/UL — SIGNIFICANT CHANGE UP (ref 3.8–10.5)
WBC # FLD AUTO: 7 K/UL — SIGNIFICANT CHANGE UP (ref 3.8–10.5)

## 2017-10-05 PROCEDURE — 99233 SBSQ HOSP IP/OBS HIGH 50: CPT

## 2017-10-05 RX ORDER — ELECTROLYTE SOLUTION,INJ
1 VIAL (ML) INTRAVENOUS
Qty: 0 | Refills: 0 | Status: DISCONTINUED | OUTPATIENT
Start: 2017-10-05 | End: 2017-10-05

## 2017-10-05 RX ORDER — ONDANSETRON 8 MG/1
4 TABLET, FILM COATED ORAL ONCE
Qty: 0 | Refills: 0 | Status: COMPLETED | OUTPATIENT
Start: 2017-10-05 | End: 2017-10-05

## 2017-10-05 RX ORDER — ACETAMINOPHEN 500 MG
1000 TABLET ORAL ONCE
Qty: 0 | Refills: 0 | Status: COMPLETED | OUTPATIENT
Start: 2017-10-05 | End: 2017-10-05

## 2017-10-05 RX ORDER — ACETAMINOPHEN 500 MG
650 TABLET ORAL ONCE
Qty: 0 | Refills: 0 | Status: COMPLETED | OUTPATIENT
Start: 2017-10-05 | End: 2017-10-05

## 2017-10-05 RX ADMIN — Medication 1000 MILLIGRAM(S): at 19:40

## 2017-10-05 RX ADMIN — NYSTATIN CREAM 1 APPLICATION(S): 100000 CREAM TOPICAL at 06:06

## 2017-10-05 RX ADMIN — ENOXAPARIN SODIUM 30 MILLIGRAM(S): 100 INJECTION SUBCUTANEOUS at 06:05

## 2017-10-05 RX ADMIN — Medication 300 MILLIGRAM(S): at 00:11

## 2017-10-05 RX ADMIN — Medication 400 MILLIGRAM(S): at 19:01

## 2017-10-05 RX ADMIN — Medication 300 MILLIGRAM(S): at 11:57

## 2017-10-05 RX ADMIN — Medication 2: at 06:06

## 2017-10-05 RX ADMIN — LOSARTAN POTASSIUM 50 MILLIGRAM(S): 100 TABLET, FILM COATED ORAL at 06:05

## 2017-10-05 RX ADMIN — Medication 50 MILLIGRAM(S): at 22:21

## 2017-10-05 RX ADMIN — ESCITALOPRAM OXALATE 20 MILLIGRAM(S): 10 TABLET, FILM COATED ORAL at 22:22

## 2017-10-05 RX ADMIN — Medication 650 MILLIGRAM(S): at 03:05

## 2017-10-05 RX ADMIN — ONDANSETRON 4 MILLIGRAM(S): 8 TABLET, FILM COATED ORAL at 11:57

## 2017-10-05 RX ADMIN — Medication 2: at 11:57

## 2017-10-05 RX ADMIN — Medication 64 EACH: at 19:02

## 2017-10-05 RX ADMIN — ENOXAPARIN SODIUM 30 MILLIGRAM(S): 100 INJECTION SUBCUTANEOUS at 19:01

## 2017-10-05 RX ADMIN — HYDROMORPHONE HYDROCHLORIDE 0.25 MILLIGRAM(S): 2 INJECTION INTRAMUSCULAR; INTRAVENOUS; SUBCUTANEOUS at 09:30

## 2017-10-05 RX ADMIN — Medication 1 APPLICATION(S): at 06:07

## 2017-10-05 RX ADMIN — HYDROMORPHONE HYDROCHLORIDE 0.25 MILLIGRAM(S): 2 INJECTION INTRAMUSCULAR; INTRAVENOUS; SUBCUTANEOUS at 10:00

## 2017-10-05 NOTE — PROGRESS NOTE ADULT - SUBJECTIVE AND OBJECTIVE BOX
O/N: BRAYDEN, Tmax 100, tachy to 114  10/4: CX growing MRSA , one dose of vanco give,  new left subclavian cath placed old removed and cx    STATUS POST:  7/24: SBR resection, fistula resection, revision of esophagogegunostomy drain through esophageal hiatus in R mediastinum  7/29: IR drainage of 800 cc of succus  7/29: Ex-lap, SB anastamosis in BP limb breakdown with succus throughout abdomen  8/1: abd washout, drainage of abscess, biologic mesh placement, closure of abdominal wall, vac and retention suture  8/7: abd washout, mesh removal, frozen abd noted, LLQ CHECO replaced with benson drain  8/10: leak noted from previous anastamosis site on L side, mid abdomen malecot drain placed in enterotomy, JPs x 2 placed, necrotic fascia debrided, vicryl mesh sutured to fascia circumferentially, xeroform over mesh then vac dressing placed O/N: BRAYDEN, Tmax 100, tachy to 114  10/4: CX growing MRSA , one dose of vanco give,  new left subclavian cath placed old removed and cx    STATUS POST:  7/24: SBR resection, fistula resection, revision of esophagogegunostomy drain through esophageal hiatus in R mediastinum  7/29: IR drainage of 800 cc of succus  7/29: Ex-lap, SB anastamosis in BP limb breakdown with succus throughout abdomen  8/1: abd washout, drainage of abscess, biologic mesh placement, closure of abdominal wall, vac and retention suture  8/7: abd washout, mesh removal, frozen abd noted, LLQ CHECO replaced with benson drain  8/10: leak noted from previous anastamosis site on L side, mid abdomen malecot drain placed in enterotomy, JPs x 2 placed, necrotic fascia debrided, vicryl mesh sutured to fascia circumferentially, xeroform over mesh then vac dressing placed	      SUBJECTIVE: Patient denies any complaints  Flatus: [] YES [x] NO             Bowel Movement: [ ] YES [x ] NO  Pain (0-10):            Pain Control Adequate: [ x] YES [ ] NO  Nausea: [ ] YES [x ] NO            Vomiting: [ ] YES [x ] NO  Diarrhea: [ ] YES [x ] NO         Constipation: [ ] YES [x ] NO     Chest Pain: [ ] YES [x ] NO    SOB:  [ ] YES [x ] NO    enoxaparin Injectable 30 milliGRAM(s) SubCutaneous two times a day  heparin  flush 100 Units/mL Injectable 100 Unit(s) IV Push every other day  heparin  flush 100 Units/mL Injectable 100 Unit(s) IV Push every other day  losartan 50 milliGRAM(s) Oral daily  vancomycin  IVPB 1500 milliGRAM(s) IV Intermittent every 12 hours      Vital Signs Last 24 Hrs  T(C): 36.6 (05 Oct 2017 08:30), Max: 37.8 (05 Oct 2017 00:41)  T(F): 97.8 (05 Oct 2017 08:30), Max: 100 (05 Oct 2017 00:41)  HR: 95 (05 Oct 2017 08:30) (83 - 117)  BP: 102/80 (05 Oct 2017 08:30) (102/80 - 125/71)  BP(mean): --  RR: 16 (05 Oct 2017 08:30) (16 - 17)  SpO2: 95% (05 Oct 2017 08:30) (92% - 95%)  I&O's Detail    04 Oct 2017 07:01  -  05 Oct 2017 07:00  --------------------------------------------------------  IN:    Fat Emulsion 20%: 228.8 mL    TPN (Total Parenteral Nutrition): 704 mL  Total IN: 932.8 mL    OUT:    VAC (Vacuum Assisted Closure) System: 450 mL    Voided: 200 mL  Total OUT: 650 mL    Total NET: 282.8 mL          General: NAD, resting comfortably in bed  C/V: NSR  Pulm: Nonlabored breathing, no respiratory distress  Abd: soft, NT/ND.  Extrem: WWP, no edema, SCDs in place        LABS:                        9.6    7.0   )-----------( 165      ( 05 Oct 2017 07:13 )             29.5     10-05    136  |  97  |  36<H>  ----------------------------<  161<H>  3.5   |  25  |  0.65    Ca    8.9      05 Oct 2017 07:13  Phos  2.3     10-05  Mg     2.4     10-05

## 2017-10-05 NOTE — PROGRESS NOTE ADULT - SUBJECTIVE AND OBJECTIVE BOX
INTERVAL HPI/OVERNIGHT EVENTS:    ID asked to follow up on this patient re:  MRSA bacteremia.  57 year old female with PMHx of HTN, gastric bypass in 2002 c/b stricture, corkscrewed sleeve and chronic leak, revised on 11/28/16 with protracted (70-day) postoperative course complicated by MDR infections, a C-diff infection, respiratory compromise due to plugging/PNA/effusions requiring multiple interventions and a trach, as well as a continued leak requiring a draining double-pigtail with stenting performed by GI. 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 patient with pseudomonas in prior cultures ,yeast  an VRE.  Patient has been in the hospital for > 70 days.  She is s/p multiple washouts.  Course complicated by abdominal infections requiring weeks of IV antibiotics. She had a left IJ triple lumen catheter in place x several weeks.  She began having fevers on October 3rd.  Central line was pulled and culture sent for tip.  Catheter tip showed MRSA.  Blood cultures also showed MRSA.  ID reconsulted for IV antibiotics.    Patient feels well at this time.  Fevers have resolved.  New central line was placed on right      PAST MEDICAL & SURGICAL HISTORY:      CONSTITUTIONAL:  Negative fever or chills, feels well, good appetite  EYES:  Negative  blurry vision or double vision  CARDIOVASCULAR:  Negative for chest pain or palpitations  RESPIRATORY:  Negative for cough, wheezing, or SOB   GASTROINTESTINAL:  Negative for nausea, vomiting, diarrhea, constipation, or abdominal pain  GENITOURINARY:  Negative frequency, urgency or dysuria  NEUROLOGIC:  No headache, confusion, dizziness, lightheadedness      ANTIBIOTICS/RELEVANT:    MEDICATIONS  (STANDING):  acetaminophen  IVPB. 1000 milliGRAM(s) IV Intermittent once  calcitriol Injectable 1 MICROGram(s) IV Push <User Schedule>  collagenase Ointment 1 Application(s) Topical daily  cyanocobalamin Injectable 1000 MICROGram(s) SubCutaneous every 7 days  dextrose 5%. 1000 milliLiter(s) (50 mL/Hr) IV Continuous <Continuous>  dextrose 50% Injectable 25 Gram(s) IV Push once  dextrose 50% Injectable 12.5 Gram(s) IV Push once  diazepam    Tablet 5 milliGRAM(s) Oral at bedtime  diphenhydrAMINE   Injectable 50 milliGRAM(s) IV Push at bedtime  enoxaparin Injectable 30 milliGRAM(s) SubCutaneous two times a day  escitalopram 20 milliGRAM(s) Oral daily  heparin  flush 100 Units/mL Injectable 100 Unit(s) IV Push every other day  heparin  flush 100 Units/mL Injectable 100 Unit(s) IV Push every other day  HYDROmorphone  Injectable 0.25 milliGRAM(s) IV Push daily  insulin lispro (HumaLOG) corrective regimen sliding scale   SubCutaneous every 6 hours  losartan 50 milliGRAM(s) Oral daily  nystatin Powder 1 Application(s) Topical two times a day  pantoprazole  Injectable 40 milliGRAM(s) IV Push daily  Parenteral Nutrition - Adult 1 Each (64 mL/Hr) TPN Continuous <Continuous>  Parenteral Nutrition - Adult 1 Each (64 mL/Hr) TPN Continuous <Continuous>  sodium chloride 0.9% lock flush 20 milliLiter(s) IV Push once  vancomycin  IVPB 1500 milliGRAM(s) IV Intermittent every 12 hours    MEDICATIONS  (PRN):  sodium chloride 0.9% lock flush 10 milliLiter(s) IV Push every 1 hour PRN After each medication administration  sodium chloride 0.9% lock flush 10 milliLiter(s) IV Push every 12 hours PRN Lumen of catheter NOT used        Vital Signs Last 24 Hrs  T(C): 36.9 (05 Oct 2017 16:19), Max: 37.8 (05 Oct 2017 00:41)  T(F): 98.5 (05 Oct 2017 16:19), Max: 100 (05 Oct 2017 00:41)  HR: 90 (05 Oct 2017 16:19) (83 - 114)  BP: 128/82 (05 Oct 2017 16:19) (102/80 - 128/82)  BP(mean): --  RR: 17 (05 Oct 2017 16:19) (16 - 17)  SpO2: 95% (05 Oct 2017 16:19) (92% - 97%)    PHYSICAL EXAM:  Constitutional:Well-developed, well nourished  Eyes:CL, EOMI  Ear/Nose/Throat: no oral lesion, no sinus tenderness on percussion	  Neck:no JVD, no lymphadenopathy, supple  Respiratory: CTA eileen  Cardiovascular: S1S2 RRR, no murmurs  Gastrointestinal:soft, (+) BS, no HSM  Extremities:no e/e/c  Vascular: DP Pulse:	right normal; left normal      LABS:                        9.6    7.0   )-----------( 165      ( 05 Oct 2017 07:13 )             29.5     10-05    136  |  97  |  36<H>  ----------------------------<  161<H>  3.5   |  25  |  0.65    Ca    8.9      05 Oct 2017 07:13  Phos  2.3     10-05  Mg     2.4     10-05            MICROBIOLOGY:  Culture - Catheter (10.04.17 @ 17:29)    Specimen Source: Cath Tip cath tip    Culture Results:   Too numerous to count Staphylococcus aureus  Susceptibility to follow.    Culture - Blood (10.03.17 @ 12:14)    -  Methicillin resistant Staphylococcus aureus (MRSA): [facility] notified  10/04/2017 10:11:36 (Ms. CHIKIS Torres, RN)    Gram Stain:   Aerobic and Anaerobic Bottle: Gram Positive Cocci in Clusters  Result called to and read back by_ Ms. CHIKIS Torres RN  10/04/2017 10:11:36  ***Blood Panel PCR results on this specimen are available  approximately 3 hours after the Gram stain result.***  Gram stain, PCR, and/or culture results may not always  correspond due to difference in methodologies.  ************************************************************  This PCR assay was performed using [employer].  The following targets are tested for: Enterococcus,  vancomycin resistant enterococci, Listeria monocytogenes,  coagulase negative staphylococci, S. aureus,  methicillin resistant S. aureus, Streptococcus agalactiae  (Group B), S. pneumoniae, S. pyogenes (Group A),  Acinetobacter baumannii, Enterobacter cloacae, E. coli,  Klebsiella oxytoca, K. pneumoniae, Proteus sp.,  Serratia marcescens, Haemophilus influenzae,  Neisseria meningitidis, Pseudomonas aeruginosa, Candida  albicans, C. glabrata, C krusei, C parapsilosis,  C. tropicalis and the KPC resistance gene.    Specimen Source: .Blood Blood    Organism: Blood Culture PCR    Culture Results:   Growth in aerobic and anaerobic bottles: Staphylococcus aureus  presumptive Methicillin resistant  Confirmation to follow within 24 hours  Susceptibility to follow.  Culture in progress    Organism Identification: Blood Culture PCR    Method Type: PCR    Culture - Viral, Respiratory (08.17.17 @ 06:33)    Culture - Viral, Respiratory: REV 1430: SOURCE: BRONCHOALVEOLAR LAVAGE  VIRAL CULTURE, RESPIRATORY                             FINAL    CYTOMEGALOVIRUS DETECTED  Test Performed by:  [place] [address]  SOURCE: BRONCHOALVEOLAR LAVAGE  ** Additional Report **  VIRAL CULTURE, RESPIRATORY                             FINAL    CYTOMEGALOVIRUS DETECTED  Test Performed by:  [place] [address]          RADIOLOGY & ADDITIONAL STUDIES: INTERVAL HPI/OVERNIGHT EVENTS:    ID asked to follow up on this patient re:  MRSA bacteremia.  57 year old female with PMHx of HTN, gastric bypass in 2002 c/b stricture, corkscrewed sleeve and chronic leak, revised on 11/28/16 with protracted (70-day) postoperative course complicated by MDR infections, a C-diff infection, respiratory compromise due to plugging/PNA/effusions requiring multiple interventions and a trach, as well as a continued leak requiring a draining double-pigtail with stenting performed by GI. 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 patient with pseudomonas in prior cultures ,yeast  an VRE.  Patient has been in the hospital for > 70 days.  She is s/p multiple washouts.  Course complicated by abdominal infections requiring weeks of IV antibiotics. She had a left IJ triple lumen catheter in place x several weeks.  She began having fevers on October 3rd.  Central line was pulled and culture sent for tip.  Catheter tip showed MRSA.  Blood cultures also showed MRSA.  ID reconsulted for IV antibiotics.    Patient feels well at this time.  Fevers have resolved.  New central line was placed on right      PAST MEDICAL & SURGICAL HISTORY:      CONSTITUTIONAL:  Negative fever or chills, feels well, good appetite  EYES:  Negative  blurry vision or double vision  CARDIOVASCULAR:  Negative for chest pain or palpitations  RESPIRATORY:  Negative for cough, wheezing, or SOB   GASTROINTESTINAL:  Negative for nausea, vomiting, diarrhea, constipation, or abdominal pain  GENITOURINARY:  Negative frequency, urgency or dysuria  NEUROLOGIC:  No headache, confusion, dizziness, lightheadedness      ANTIBIOTICS/RELEVANT:    MEDICATIONS  (STANDING):  acetaminophen  IVPB. 1000 milliGRAM(s) IV Intermittent once  calcitriol Injectable 1 MICROGram(s) IV Push <User Schedule>  collagenase Ointment 1 Application(s) Topical daily  cyanocobalamin Injectable 1000 MICROGram(s) SubCutaneous every 7 days  dextrose 5%. 1000 milliLiter(s) (50 mL/Hr) IV Continuous <Continuous>  dextrose 50% Injectable 25 Gram(s) IV Push once  dextrose 50% Injectable 12.5 Gram(s) IV Push once  diazepam    Tablet 5 milliGRAM(s) Oral at bedtime  diphenhydrAMINE   Injectable 50 milliGRAM(s) IV Push at bedtime  enoxaparin Injectable 30 milliGRAM(s) SubCutaneous two times a day  escitalopram 20 milliGRAM(s) Oral daily  heparin  flush 100 Units/mL Injectable 100 Unit(s) IV Push every other day  heparin  flush 100 Units/mL Injectable 100 Unit(s) IV Push every other day  HYDROmorphone  Injectable 0.25 milliGRAM(s) IV Push daily  insulin lispro (HumaLOG) corrective regimen sliding scale   SubCutaneous every 6 hours  losartan 50 milliGRAM(s) Oral daily  nystatin Powder 1 Application(s) Topical two times a day  pantoprazole  Injectable 40 milliGRAM(s) IV Push daily  Parenteral Nutrition - Adult 1 Each (64 mL/Hr) TPN Continuous <Continuous>  Parenteral Nutrition - Adult 1 Each (64 mL/Hr) TPN Continuous <Continuous>  sodium chloride 0.9% lock flush 20 milliLiter(s) IV Push once  vancomycin  IVPB 1500 milliGRAM(s) IV Intermittent every 12 hours    MEDICATIONS  (PRN):  sodium chloride 0.9% lock flush 10 milliLiter(s) IV Push every 1 hour PRN After each medication administration  sodium chloride 0.9% lock flush 10 milliLiter(s) IV Push every 12 hours PRN Lumen of catheter NOT used        Vital Signs Last 24 Hrs  T(C): 36.9 (05 Oct 2017 16:19), Max: 37.8 (05 Oct 2017 00:41)  T(F): 98.5 (05 Oct 2017 16:19), Max: 100 (05 Oct 2017 00:41)  HR: 90 (05 Oct 2017 16:19) (83 - 114)  BP: 128/82 (05 Oct 2017 16:19) (102/80 - 128/82)  BP(mean): --  RR: 17 (05 Oct 2017 16:19) (16 - 17)  SpO2: 95% (05 Oct 2017 16:19) (92% - 97%)    PHYSICAL EXAM:  Constitutional: no acute distress  Eyes: no icterus  Ear/Nose/Throat: no oral lesion, 	  Neck:no JVD, no lymphadenopathy, supple, right IJ  Respiratory: CTA eileen  Cardiovascular: S1S2 RRR, no murmurs  Gastrointestinal:soft,+ colostomy, open wound, no erythema  Extremities:+ edema  LABS:                        9.6    7.0   )-----------( 165      ( 05 Oct 2017 07:13 )             29.5     10-05    136  |  97  |  36<H>  ----------------------------<  161<H>  3.5   |  25  |  0.65    Ca    8.9      05 Oct 2017 07:13  Phos  2.3     10-05  Mg     2.4     10-05            MICROBIOLOGY:  Culture - Catheter (10.04.17 @ 17:29)    Specimen Source: Cath Tip cath tip    Culture Results:   Too numerous to count Staphylococcus aureus  Susceptibility to follow.    Culture - Blood (10.03.17 @ 12:14)    -  Methicillin resistant Staphylococcus aureus (MRSA): [facility] notified  10/04/2017 10:11:36 (Ms. CHIKIS Torres RN)    Gram Stain:   Aerobic and Anaerobic Bottle: Gram Positive Cocci in Clusters  Result called to and read back by_ Ms. CHIKIS Torres RN  10/04/2017 10:11:36  ***Blood Panel PCR results on this specimen are available  approximately 3 hours after the Gram stain result.***  Gram stain, PCR, and/or culture results may not always  correspond due to difference in methodologies.  ************************************************************  This PCR assay was performed using [employer].  The following targets are tested for: Enterococcus,  vancomycin resistant enterococci, Listeria monocytogenes,  coagulase negative staphylococci, S. aureus,  methicillin resistant S. aureus, Streptococcus agalactiae  (Group B), S. pneumoniae, S. pyogenes (Group A),  Acinetobacter baumannii, Enterobacter cloacae, E. coli,  Klebsiella oxytoca, K. pneumoniae, Proteus sp.,  Serratia marcescens, Haemophilus influenzae,  Neisseria meningitidis, Pseudomonas aeruginosa, Candida  albicans, C. glabrata, C krusei, C parapsilosis,  C. tropicalis and the KPC resistance gene.    Specimen Source: .Blood Blood    Organism: Blood Culture PCR    Culture Results:   Growth in aerobic and anaerobic bottles: Staphylococcus aureus  presumptive Methicillin resistant  Confirmation to follow within 24 hours  Susceptibility to follow.  Culture in progress    Organism Identification: Blood Culture PCR    Method Type: PCR    Culture - Viral, Respiratory (08.17.17 @ 06:33)    Culture - Viral, Respiratory: REV 1430: SOURCE: BRONCHOALVEOLAR LAVAGE  VIRAL CULTURE, RESPIRATORY                             FINAL    CYTOMEGALOVIRUS DETECTED  Test Performed by:  [place] [address]  SOURCE: BRONCHOALVEOLAR LAVAGE  ** Additional Report **  VIRAL CULTURE, RESPIRATORY                             FINAL    CYTOMEGALOVIRUS DETECTED  Test Performed by:  [place] [id number]          RADIOLOGY & ADDITIONAL STUDIES:

## 2017-10-05 NOTE — CHART NOTE - NSCHARTNOTEFT_GEN_A_CORE
Admitting Diagnosis:   Patient is a 57y old  Female who presents with a chief complaint of enterocutaneous fistula (24 Jul 2017 14:15)  Contained leak from gastric pouch again noted with markedly decreased  amount of fluid between the gastric pouch and spleen.   Marked decrease in the other intra-abdominal fluid collections.  Small left pleural effusion. Bilateral basilar atelectatic change    PAST MEDICAL & SURGICAL HISTORY:  Reflux  Obesity  DVT (deep venous thrombosis): 2013 neck  Diverticulitis  Hypertension  Elective surgery: abodominal wall surgery  dec 2016  Elective surgery: NOV 2016 gastric bypass revision  Gastric bypass status for obesity: gastric sleeve, 6/2012  S/P breast biopsy: 2011, left  S/P colon resection: 2011  S/P knee surgery: repair 2009, 2011  right; left  Umbilical hernia: 2000  S/P appendectomy: 1975  S/P tonsillectomy: 1967      Current Nutrition Order:   Diet, NPO:   Except Medications  With Ice Chips/Sips of Water (10-04-17 @ 18:16)   TPN via PICC line: 254 g D, 104 g AA, 50g 20% lipids. Providing pt with 1779 kcal, 104 g protein, 1858 mL fluid. GIR = 1.77.    GI Issues:  nausea reported, RN aware and managing PRN. -BM, +flatus    Pain: Being managed     Skin Integrity: Unstageable pressure ulcer on sacrum, new central venous catheter 10/4. wound vac to midline abdomen, malecot in entertotomy site,  Midline xiphoid to pubis incision; DTI & stage 2 pressure ulcer noted per MD note. Concern for new developing ulcer.      Labs:   10-05    136  |  97  |  36<H>  ----------------------------<  161<H>  3.5   |  25  |  0.65    Ca    8.9      05 Oct 2017 07:13  Phos  2.3     10-05  Mg     2.4     10-05      CAPILLARY BLOOD GLUCOSE  155 (05 Oct 2017 11:18)  161 (05 Oct 2017 06:00)  128 (05 Oct 2017 00:41)  103 (04 Oct 2017 18:00)      Medications:  MEDICATIONS  (STANDING):  calcitriol Injectable 1 MICROGram(s) IV Push <User Schedule>  collagenase Ointment 1 Application(s) Topical daily  cyanocobalamin Injectable 1000 MICROGram(s) SubCutaneous every 7 days  dextrose 5%. 1000 milliLiter(s) (50 mL/Hr) IV Continuous <Continuous>  dextrose 50% Injectable 25 Gram(s) IV Push once  dextrose 50% Injectable 12.5 Gram(s) IV Push once  diazepam    Tablet 5 milliGRAM(s) Oral at bedtime  diphenhydrAMINE   Injectable 50 milliGRAM(s) IV Push at bedtime  enoxaparin Injectable 30 milliGRAM(s) SubCutaneous two times a day  escitalopram 20 milliGRAM(s) Oral daily  heparin  flush 100 Units/mL Injectable 100 Unit(s) IV Push every other day  heparin  flush 100 Units/mL Injectable 100 Unit(s) IV Push every other day  HYDROmorphone  Injectable 0.25 milliGRAM(s) IV Push daily  insulin lispro (HumaLOG) corrective regimen sliding scale   SubCutaneous every 6 hours  losartan 50 milliGRAM(s) Oral daily  nystatin Powder 1 Application(s) Topical two times a day  pantoprazole  Injectable 40 milliGRAM(s) IV Push daily  Parenteral Nutrition - Adult 1 Each (64 mL/Hr) TPN Continuous <Continuous>  Parenteral Nutrition - Adult 1 Each (64 mL/Hr) TPN Continuous <Continuous>  sodium chloride 0.9% lock flush 20 milliLiter(s) IV Push once  vancomycin  IVPB 1500 milliGRAM(s) IV Intermittent every 12 hours    MEDICATIONS  (PRN):  sodium chloride 0.9% lock flush 10 milliLiter(s) IV Push every 1 hour PRN After each medication administration  sodium chloride 0.9% lock flush 10 milliLiter(s) IV Push every 12 hours PRN Lumen of catheter NOT used      Weight: 99.5kg/ 219lbs.  Per bed scale 10/5 BW is 181lbs.   Discussed w/ RN obtaining standing wt. Will continue to trend bed scale wts to more accurately assess meeting nutrient needs via TPN    Weight Change: No new weight since Aug 1st. Need to obtain new actual weight.    Estimated energy needs: remain unchanged from previous follow ups  Estimated energy needs using 52 kg IBW:  30-35 kcal/kg (4746-0599 kcal).   1.8-2 g/kg ( g protein).  30-35 mL/kg (3833-1027 mL fluid).     Subjective:   Pt remains on TPN via PICC line. NPO except meds. RD observed pt sipping water w/ tolerance PO. No new triglyceride labs prev. WNL, .  Malecot in enterotomy site, wound vac to midline, DTI, and stage II pressure injury. Concern for new pressure ulcer per provider handoff. TPN providing 100% estimated needs at present. Pt reports flatus, denies any recent BMs. Pt to continue receiving 100% nutrition via TPN until fistulas resolve and reconstructive surgery is feasible.     Previous Nutrition Diagnosis: Increased nutrient needs RT fistula & s/p surgery AEB increased kcal/protein needs.     Active [  X]  Resolved [   ]    Goal: PT to meet >75% EER via tolerated route    Recommendations:   1. Please take actual weight for trending purposes  2. Continue with current TPN order  3. PO diet per MD discretion   4. Continue to check labs: triglycerides, BG & FS  5 Check labs for signs of fat malabsorption, consider ADEK vitamin.     Education: Understands meeting nutrient needs via TPN.    Risk Level: High [ X  ] Moderate [   ] Low [   ].

## 2017-10-05 NOTE — PROGRESS NOTE ADULT - ASSESSMENT
IMPRESSION:  Fevers likely secondary to MRSA bacteremia secondary to catheter infection.  Catheter removed.  Fever curve improved      Recommend:  1.  Continue vancomycin 1500 mg IV q12 for now  2.  Check vanco trough 30 min prior to 4th dose (goal 15-20)  3.  Needs at least 4 weeks IV antibiotics  4.  Check blood culture daily until cleared

## 2017-10-05 NOTE — CHART NOTE - NSCHARTNOTEFT_GEN_A_CORE
Pt stated she wanted to kill herself. Psych was consulted and stated they would see her as soon as possible but that may mean tomorrow morning. Precautions put in place.

## 2017-10-05 NOTE — PROGRESS NOTE ADULT - ASSESSMENT
57 F s/p  SBR, fistula resection, esophagojejunostomy revision w/ post-op course complicated by RTOR for distal anastomosis breakdown, ATN, acute respiratory failure, & septic shock, since resolved.  NPO/TPN/IVF  Pain/nausea control - only benadryl at midnight and dilaudid during VAC change  ISS  L IJ  Nystatin powder  SCDs, lovenox, OOBA  Drains: Malecot in enterotomy site, wound vac to midline  Wounds: Midline xiphoid to pubis incision; DTI & stage 2 pressure ulcer noted.

## 2017-10-06 DIAGNOSIS — F43.24 ADJUSTMENT DISORDER WITH DISTURBANCE OF CONDUCT: ICD-10-CM

## 2017-10-06 LAB
-  CEFAZOLIN: SIGNIFICANT CHANGE UP
-  CLINDAMYCIN: SIGNIFICANT CHANGE UP
-  DAPTOMYCIN: SIGNIFICANT CHANGE UP
-  ERYTHROMYCIN: SIGNIFICANT CHANGE UP
-  LINEZOLID: SIGNIFICANT CHANGE UP
-  OXACILLIN: SIGNIFICANT CHANGE UP
-  PENICILLIN: SIGNIFICANT CHANGE UP
-  RIFAMPIN: SIGNIFICANT CHANGE UP
-  TETRACYCLINE: SIGNIFICANT CHANGE UP
-  TRIMETHOPRIM/SULFAMETHOXAZOLE: SIGNIFICANT CHANGE UP
-  VANCOMYCIN: SIGNIFICANT CHANGE UP
ALBUMIN SERPL ELPH-MCNC: 2.3 G/DL — LOW (ref 3.3–5)
ALP SERPL-CCNC: 273 U/L — HIGH (ref 40–120)
ALT FLD-CCNC: 45 U/L — SIGNIFICANT CHANGE UP (ref 10–45)
ANION GAP SERPL CALC-SCNC: 12 MMOL/L — SIGNIFICANT CHANGE UP (ref 5–17)
AST SERPL-CCNC: 32 U/L — SIGNIFICANT CHANGE UP (ref 10–40)
BILIRUB SERPL-MCNC: 1.1 MG/DL — SIGNIFICANT CHANGE UP (ref 0.2–1.2)
BUN SERPL-MCNC: 25 MG/DL — HIGH (ref 7–23)
CALCIUM SERPL-MCNC: 8.8 MG/DL — SIGNIFICANT CHANGE UP (ref 8.4–10.5)
CHLORIDE SERPL-SCNC: 97 MMOL/L — SIGNIFICANT CHANGE UP (ref 96–108)
CO2 SERPL-SCNC: 26 MMOL/L — SIGNIFICANT CHANGE UP (ref 22–31)
CREAT SERPL-MCNC: 0.47 MG/DL — LOW (ref 0.5–1.3)
CULTURE RESULTS: SIGNIFICANT CHANGE UP
GLUCOSE SERPL-MCNC: 125 MG/DL — HIGH (ref 70–99)
GRAM STN FLD: SIGNIFICANT CHANGE UP
GRAM STN FLD: SIGNIFICANT CHANGE UP
HCT VFR BLD CALC: 26.6 % — LOW (ref 34.5–45)
HGB BLD-MCNC: 8.9 G/DL — LOW (ref 11.5–15.5)
MAGNESIUM SERPL-MCNC: 2 MG/DL — SIGNIFICANT CHANGE UP (ref 1.6–2.6)
MCHC RBC-ENTMCNC: 30.1 PG — SIGNIFICANT CHANGE UP (ref 27–34)
MCHC RBC-ENTMCNC: 33.5 G/DL — SIGNIFICANT CHANGE UP (ref 32–36)
MCV RBC AUTO: 89.9 FL — SIGNIFICANT CHANGE UP (ref 80–100)
METHOD TYPE: SIGNIFICANT CHANGE UP
METHOD TYPE: SIGNIFICANT CHANGE UP
ORGANISM # SPEC MICROSCOPIC CNT: SIGNIFICANT CHANGE UP
PHOSPHATE SERPL-MCNC: 2.6 MG/DL — SIGNIFICANT CHANGE UP (ref 2.5–4.5)
PLATELET # BLD AUTO: 131 K/UL — LOW (ref 150–400)
POTASSIUM SERPL-MCNC: 3.5 MMOL/L — SIGNIFICANT CHANGE UP (ref 3.5–5.3)
POTASSIUM SERPL-SCNC: 3.5 MMOL/L — SIGNIFICANT CHANGE UP (ref 3.5–5.3)
PREALB SERPL-MCNC: 12 MG/DL — LOW (ref 20–40)
PROT SERPL-MCNC: 5.8 G/DL — LOW (ref 6–8.3)
RBC # BLD: 2.96 M/UL — LOW (ref 3.8–5.2)
RBC # FLD: 17.8 % — HIGH (ref 10.3–16.9)
SODIUM SERPL-SCNC: 135 MMOL/L — SIGNIFICANT CHANGE UP (ref 135–145)
SPECIMEN SOURCE: SIGNIFICANT CHANGE UP
SPECIMEN SOURCE: SIGNIFICANT CHANGE UP
TRIGL SERPL-MCNC: 471 MG/DL — HIGH (ref 10–149)
VANCOMYCIN TROUGH SERPL-MCNC: 19.1 UG/ML — SIGNIFICANT CHANGE UP (ref 10–20)
VIT D25+D1,25 OH+D1,25 PNL SERPL-MCNC: 36.7 PG/ML — SIGNIFICANT CHANGE UP (ref 19.9–79.3)
WBC # BLD: 4.5 K/UL — SIGNIFICANT CHANGE UP (ref 3.8–10.5)
WBC # FLD AUTO: 4.5 K/UL — SIGNIFICANT CHANGE UP (ref 3.8–10.5)

## 2017-10-06 PROCEDURE — 90792 PSYCH DIAG EVAL W/MED SRVCS: CPT

## 2017-10-06 PROCEDURE — 99233 SBSQ HOSP IP/OBS HIGH 50: CPT

## 2017-10-06 RX ORDER — ACETAMINOPHEN 500 MG
650 TABLET ORAL EVERY 6 HOURS
Qty: 0 | Refills: 0 | Status: DISCONTINUED | OUTPATIENT
Start: 2017-10-06 | End: 2017-11-09

## 2017-10-06 RX ORDER — ELECTROLYTE SOLUTION,INJ
1 VIAL (ML) INTRAVENOUS
Qty: 0 | Refills: 0 | Status: DISCONTINUED | OUTPATIENT
Start: 2017-10-06 | End: 2017-10-06

## 2017-10-06 RX ORDER — HYDROMORPHONE HYDROCHLORIDE 2 MG/ML
0.5 INJECTION INTRAMUSCULAR; INTRAVENOUS; SUBCUTANEOUS ONCE
Qty: 0 | Refills: 0 | Status: DISCONTINUED | OUTPATIENT
Start: 2017-10-06 | End: 2017-10-06

## 2017-10-06 RX ADMIN — Medication 300 MILLIGRAM(S): at 14:06

## 2017-10-06 RX ADMIN — ENOXAPARIN SODIUM 30 MILLIGRAM(S): 100 INJECTION SUBCUTANEOUS at 18:04

## 2017-10-06 RX ADMIN — Medication 650 MILLIGRAM(S): at 03:28

## 2017-10-06 RX ADMIN — Medication 300 MILLIGRAM(S): at 00:06

## 2017-10-06 RX ADMIN — Medication 100 UNIT(S): at 12:36

## 2017-10-06 RX ADMIN — HYDROMORPHONE HYDROCHLORIDE 0.25 MILLIGRAM(S): 2 INJECTION INTRAMUSCULAR; INTRAVENOUS; SUBCUTANEOUS at 12:52

## 2017-10-06 RX ADMIN — NYSTATIN CREAM 1 APPLICATION(S): 100000 CREAM TOPICAL at 18:05

## 2017-10-06 RX ADMIN — Medication 4: at 18:04

## 2017-10-06 RX ADMIN — HYDROMORPHONE HYDROCHLORIDE 0.5 MILLIGRAM(S): 2 INJECTION INTRAMUSCULAR; INTRAVENOUS; SUBCUTANEOUS at 13:51

## 2017-10-06 RX ADMIN — HYDROMORPHONE HYDROCHLORIDE 0.25 MILLIGRAM(S): 2 INJECTION INTRAMUSCULAR; INTRAVENOUS; SUBCUTANEOUS at 12:37

## 2017-10-06 RX ADMIN — NYSTATIN CREAM 1 APPLICATION(S): 100000 CREAM TOPICAL at 06:34

## 2017-10-06 RX ADMIN — Medication 650 MILLIGRAM(S): at 11:48

## 2017-10-06 RX ADMIN — ESCITALOPRAM OXALATE 20 MILLIGRAM(S): 10 TABLET, FILM COATED ORAL at 23:37

## 2017-10-06 RX ADMIN — CALCITRIOL 1 MICROGRAM(S): 0.5 CAPSULE ORAL at 13:51

## 2017-10-06 RX ADMIN — Medication 650 MILLIGRAM(S): at 10:48

## 2017-10-06 RX ADMIN — LOSARTAN POTASSIUM 50 MILLIGRAM(S): 100 TABLET, FILM COATED ORAL at 06:33

## 2017-10-06 RX ADMIN — ENOXAPARIN SODIUM 30 MILLIGRAM(S): 100 INJECTION SUBCUTANEOUS at 06:33

## 2017-10-06 RX ADMIN — HYDROMORPHONE HYDROCHLORIDE 0.5 MILLIGRAM(S): 2 INJECTION INTRAMUSCULAR; INTRAVENOUS; SUBCUTANEOUS at 14:06

## 2017-10-06 RX ADMIN — Medication 50 MILLIGRAM(S): at 23:37

## 2017-10-06 RX ADMIN — Medication 5 MILLIGRAM(S): at 00:13

## 2017-10-06 RX ADMIN — PANTOPRAZOLE SODIUM 40 MILLIGRAM(S): 20 TABLET, DELAYED RELEASE ORAL at 06:33

## 2017-10-06 RX ADMIN — Medication 1 APPLICATION(S): at 06:34

## 2017-10-06 RX ADMIN — Medication 650 MILLIGRAM(S): at 23:37

## 2017-10-06 RX ADMIN — Medication 1 EACH: at 18:05

## 2017-10-06 NOTE — PROGRESS NOTE ADULT - ASSESSMENT
IMPRESSION:  MRSA bacteremia.  Clinically stable    Recommend:  1.  Continue Vancomycin 1500 mg IV q12  2.  Check trough 30 min before tonight's dose  3.  Check repeat blood culture to document clearance

## 2017-10-06 NOTE — PROGRESS NOTE ADULT - SUBJECTIVE AND OBJECTIVE BOX
O/N: VSS, vanc trough 21.4, psych consulted but pt was not seen, placed on one to one, BMP unchanged  10/5: Patient agrees to get out of bed for sips of water, fistula manager placed on wound site by Dr. Roach next change on Monday,  patient reports she wants to kill her self psych called , labs ordered     STATUS POST:  7/24: SBR resection, fistula resection, revision of esophagogegunostomy drain through esophageal hiatus in R mediastinum  7/29: IR drainage of 800 cc of succus  7/29: Ex-lap, SB anastamosis in BP limb breakdown with succus throughout abdomen  8/1: abd washout, drainage of abscess, biologic mesh placement, closure of abdominal wall, vac and retention suture  8/7: abd washout, mesh removal, frozen abd noted, LLQ CHECO replaced with benson drain  8/10: leak noted from previous anastamosis site on L side, mid abdomen malecot drain placed in enterotomy, JPs x 2 placed, necrotic fascia debrided, vicryl mesh sutured to fascia circumferentially, xeroform over mesh then vac dressing placed O/N: VSS, vanc trough 21.4, psych consulted but pt was not seen, placed on one to one, BMP unchanged  10/5: Patient agrees to get out of bed for sips of water, fistula manager placed on wound site by Dr. Roach next change on Monday,  patient reports she wants to kill her self psych called , labs ordered     STATUS POST:  7/24: SBR resection, fistula resection, revision of esophagogegunostomy drain through esophageal hiatus in R mediastinum  7/29: IR drainage of 800 cc of succus  7/29: Ex-lap, SB anastamosis in BP limb breakdown with succus throughout abdomen  8/1: abd washout, drainage of abscess, biologic mesh placement, closure of abdominal wall, vac and retention suture  8/7: abd washout, mesh removal, frozen abd noted, LLQ CHECO replaced with benson drain  8/10: leak noted from previous anastamosis site on L side, mid abdomen malecot drain placed in enterotomy, JPs x 2 placed, necrotic fascia debrided, vicryl mesh sutured to fascia circumferentially, xeroform over mesh then vac dressing placed	     SUBJECTIVE: Patient seen and examined bedside by chief resident. Fistula leak. no pain, no suicidal thought    enoxaparin Injectable 30 milliGRAM(s) SubCutaneous two times a day  heparin  flush 100 Units/mL Injectable 100 Unit(s) IV Push every other day  heparin  flush 100 Units/mL Injectable 100 Unit(s) IV Push every other day  losartan 50 milliGRAM(s) Oral daily  vancomycin  IVPB 1500 milliGRAM(s) IV Intermittent every 12 hours      Vital Signs Last 24 Hrs  T(C): 36.7 (06 Oct 2017 10:06), Max: 37.1 (05 Oct 2017 21:28)  T(F): 98.1 (06 Oct 2017 10:06), Max: 98.8 (06 Oct 2017 05:02)  HR: 78 (06 Oct 2017 10:06) (78 - 90)  BP: 143/88 (06 Oct 2017 10:06) (108/84 - 143/88)  BP(mean): --  RR: 16 (06 Oct 2017 10:06) (16 - 17)  SpO2: 97% (06 Oct 2017 10:06) (95% - 97%)  I&O's Detail    05 Oct 2017 07:01  -  06 Oct 2017 07:00  --------------------------------------------------------  IN:    TPN (Total Parenteral Nutrition): 512 mL  Total IN: 512 mL    OUT:    Drain: 150 mL    Voided: 750 mL  Total OUT: 900 mL    Total NET: -388 mL          General: NAD, resting comfortably in bed  C/V: NSR  Pulm: Nonlabored breathing, no respiratory distress  Abd: soft, NT/ND. fistula still leak.   Extrem: WWP, no edema, SCDs in place        LABS:                        8.9    4.5   )-----------( 131      ( 06 Oct 2017 06:18 )             26.6     10-06    135  |  97  |  25<H>  ----------------------------<  125<H>  3.5   |  26  |  0.47<L>    Ca    8.8      06 Oct 2017 06:17  Phos  2.6     10-06  Mg     2.0     10-06    TPro  5.8<L>  /  Alb  2.3<L>  /  TBili  1.1  /  DBili  x   /  AST  32  /  ALT  45  /  AlkPhos  273<H>  10-06          RADIOLOGY & ADDITIONAL STUDIES:

## 2017-10-06 NOTE — PROGRESS NOTE ADULT - SUBJECTIVE AND OBJECTIVE BOX
Last night got called on my cell by [facility] after midnight for medication  additionally yesterday was threatening suicide which is not serious and stating wants to go home  Today brought flowers to brighten room and make point that understand the difficult situation she is in and it makes sense that she is angry etc, but that she has survivable pathology but if deteriotes from not moving then will burn chances  Spoke to her with   will also bring speaker next week to attach for music and try to brighten outlook  will allow protein bars   also need her to work to get viral can go forward with surgery

## 2017-10-06 NOTE — PROGRESS NOTE ADULT - ASSESSMENT
57 F s/p  SBR, fistula resection, esophagojejunostomy revision w/ post-op course complicated by RTOR for distal anastomosis breakdown, ATN, acute respiratory failure, & septic shock, since resolved.  NPO/TPN/IVF  Pain/nausea control - only benadryl at midnight and dilaudid during VAC change  ISS  L IJ  Nystatin powder  SCDs, lovenox, OOBA  f/u psych  Drains: Malecot in enterotomy site, wound vac to midline  Wounds: Midline xiphoid to pubis incision; DTI & stage 2 pressure ulcer noted. 57 F s/p  SBR, fistula resection, esophagojejunostomy revision w/ post-op course complicated by RTOR for distal anastomosis breakdown, ATN, acute respiratory failure, & septic shock, since resolved.      Can have protein bar and ice chips, water.   Pain/nausea control - only benadryl at midnight and dilaudid during VAC change  ISS  L IJ  Nystatin powder  SCDs, lovenox, OOBA  f/u psych  Wounds: Midline xiphoid to pubis incision; DTI & stage 2 pressure ulcer noted.

## 2017-10-06 NOTE — BEHAVIORAL HEALTH ASSESSMENT NOTE - NSBHCHARTREVIEWVS_PSY_A_CORE FT
Vital Signs Last 24 Hrs  T(C): 36.7 (06 Oct 2017 10:06), Max: 37.1 (05 Oct 2017 21:28)  T(F): 98.1 (06 Oct 2017 10:06), Max: 98.8 (06 Oct 2017 05:02)  HR: 78 (06 Oct 2017 10:06) (78 - 90)  BP: 143/88 (06 Oct 2017 10:06) (115/75 - 143/88)  BP(mean): --  RR: 16 (06 Oct 2017 10:06) (16 - 17)  SpO2: 97% (06 Oct 2017 10:06) (95% - 97%)

## 2017-10-06 NOTE — BEHAVIORAL HEALTH ASSESSMENT NOTE - NSBHCHARTREVIEWLAB_PSY_A_CORE FT
10-06    135  |  97  |  25<H>  ----------------------------<  125<H>  3.5   |  26  |  0.47<L>    Ca    8.8      06 Oct 2017 06:17  Phos  2.6     10-06  Mg     2.0     10-06    TPro  5.8<L>  /  Alb  2.3<L>  /  TBili  1.1  /  DBili  x   /  AST  32  /  ALT  45  /  AlkPhos  273<H>  10-06                        8.9    4.5   )-----------( 131      ( 06 Oct 2017 06:18 )             26.6

## 2017-10-06 NOTE — BEHAVIORAL HEALTH ASSESSMENT NOTE - OTHER PAST PSYCHIATRIC HISTORY (INCLUDE DETAILS REGARDING ONSET, COURSE OF ILLNESS, INPATIENT/OUTPATIENT TREATMENT)
Pt states that she is currently taking Lexapro for anxiety which is effective.  Reports that she has been on and off of Lexapro throughout her lifetime, first when her daughter was being bullied in high school, and most recently when her nutritionist noticed her nail biting, and recommended it would help with her anxiety. Pt states that she is also currently taking Valium 40mg (home dose) for sleep, and has been on it for about one year.

## 2017-10-06 NOTE — BEHAVIORAL HEALTH ASSESSMENT NOTE - NSBHSUICPROTECTFACT_PSY_A_CORE
Positive therapeutic relationships/Responsibility to family and others/Identifies reasons for living/Future oriented/High spirituality/Engaged in work or school/Supportive social network or family

## 2017-10-06 NOTE — BEHAVIORAL HEALTH ASSESSMENT NOTE - HPI (INCLUDE ILLNESS QUALITY, SEVERITY, DURATION, TIMING, CONTEXT, MODIFYING FACTORS, ASSOCIATED SIGNS AND SYMPTOMS)
57F past psychiatric history of anxiety on Lexparo prescribed by PMD, no history of psychiatric inpatient or outpatient treatment, PMHx of HTN, gastric bypass in 2012 with subsequent complications, initially presented 7/24/17 for fistula repair.  Psychiatry consulted as statement made suicidal statement last night.  Per primary team patient has been requesting dilaudid repeatedly and admitted to team it was to get "high;" was told she would only get dilaudid during wound vac changes and patient subsequently started removing wound vac herself.  Also asking different staff members for food and water despite being NPO and refusing to get out of bed, which is impeding her optimization for future surgery.  Patient told yesterday she would not get dilaudid during wound vac changes and patient said "I want to kill myself."    On interview patient appeared incongruently/inappropriately bright and was extremely talkative.  Patient states that yesterday she was very angry at the hospital staff when they refused to give her any water or IV Dilaudid for pain. Patient admits that they wanted her to walk around and that is very painful for her after all of her surgeries, so she was just blurted out that she wanted to kill herself, out of frustration. Patient denies actually wanting to kill herself at the time and denies any current SI or past or present plan to harm herself. Patient states she would not kill herself because of her supportive family and because she is "spiritual."  She is excited to get back home so that she can cook for them. Patient reports her  visits her almost daily; has two children and one grandchild who visit but are "busy" and don't visit as regularly.  Patient reports that she has a very supportive  who visits her almost every day, as well as two children and one grandchild, who are also very supportive of her.  Patient states that she would benefit most from visitors "because I like to yap" but declined music therapy or chaplaincy services.  Spoke extensively about cooking and food.    When confronted with reports that patient manipulated wound vac for dilaudid, patient reports her wound vac was falling off while she was sleeping.  Patient was vague and evasive when discussing requests for dilaudid.  Patient said "I'm treating this like the first day" and says she walked this morning and will walk again this afternoon after a nap.  Patient denied depressed mood.  Reports good appetite.  Reports poor sleep in context of hospitalization.  Reports history of anixety but denies currently.  Denies past or present manci symptoms or AH/VH.

## 2017-10-06 NOTE — BEHAVIORAL HEALTH ASSESSMENT NOTE - SUMMARY
57F reported history of anxiety on Lexapro, no prior psych history, PMH gastric bypass surgery with multiple complications, currently admitted for extended period of time due to fistulas, reported SI last night.  Patient denies active or passive SI, reports suicidal statement was said in a moment of frustration but patient never had any intent of killing herself.  Reports protective factors of family and Mu-ism.  No indication to continue patient on constant observation.  Patient appears to have very low frustration tolerance and some maladaptive personality traits that are making it particularly difficult for her to cope with extended hospital stay.  Patient's expression of SI last night seems to have been in context of limit setting by primary team (for dilaudid, PO intake) as well as knowledge patient's  wasn't going to be visiting today.  Would continue psychiatric medications as currently written given patient denies active psychiatric symptoms.  Psychiatry will follow 2-3 times weekly for support. 57F reported history of anxiety on Lexapro, no prior psych history, PMH gastric bypass surgery with multiple complications, currently admitted for extended period of time due to fistulas, reported SI last night.  Patient denies active or passive SI, reports suicidal statement was said in a moment of frustration but patient never had any intent of killing herself.  Reports protective factors of family and Congregation.  No indication to continue patient on constant observation.  Patient appears to have very low frustration tolerance and some maladaptive personality traits that are making it particularly difficult for her to cope with extended hospital stay.  Patient's expression of SI last night seems to have been in context of limit setting by primary team (for dilaudid, PO intake) as well as knowledge that patient's  wasn't going to be visiting today.  Patient also likely uses food as coping mechanism given obesity and much talking about food; being NPO particularly challenging for her.  Would continue psychiatric medications as currently written given patient denies active psychiatric symptoms.  Psychiatry will follow 2-3 times weekly for support.

## 2017-10-06 NOTE — BEHAVIORAL HEALTH ASSESSMENT NOTE - NSBHCONSULTMEDS_PSY_A_CORE FT
1. Can discontinue constant observation  2. Continue Lexapro 20 mg PO daily  3. Continue Valium 5 mg PO HS  4. Consider pain management consult

## 2017-10-06 NOTE — BEHAVIORAL HEALTH ASSESSMENT NOTE - NSBHADMITCOUNSEL_PSY_A_CORE
risks and benefits of treatment options/diagnostic results/impressions and/or recommended studies/client/family/caregiver education/prognosis/instructions for management, treatment and follow up/risk factor reduction/importance of adherence to chosen treatment

## 2017-10-06 NOTE — ADVANCED PRACTICE NURSE CONSULT - REASON FOR CONSULT
WOC nurse follow up to instruct surgical team staff regarding changing wound manager over abdominal wound.

## 2017-10-06 NOTE — PROGRESS NOTE ADULT - SUBJECTIVE AND OBJECTIVE BOX
INTERVAL HPI/OVERNIGHT EVENTS:    Patient seen and examined at bedside.  No complaints.  No events overnight    CONSTITUTIONAL:  Negative fever or chills, feels well, good appetite  EYES:  Negative  blurry vision or double vision  CARDIOVASCULAR:  Negative for chest pain or palpitations  RESPIRATORY:  Negative for cough, wheezing, or SOB   GASTROINTESTINAL:  Negative for nausea, vomiting, diarrhea, constipation, or abdominal pain  GENITOURINARY:  Negative frequency, urgency or dysuria  NEUROLOGIC:  No headache, confusion, dizziness, lightheadedness      ANTIBIOTICS/RELEVANT:    MEDICATIONS  (STANDING):  calcitriol Injectable 1 MICROGram(s) IV Push <User Schedule>  collagenase Ointment 1 Application(s) Topical daily  cyanocobalamin Injectable 1000 MICROGram(s) SubCutaneous every 7 days  dextrose 5%. 1000 milliLiter(s) (50 mL/Hr) IV Continuous <Continuous>  dextrose 50% Injectable 25 Gram(s) IV Push once  dextrose 50% Injectable 12.5 Gram(s) IV Push once  diazepam    Tablet 5 milliGRAM(s) Oral at bedtime  diphenhydrAMINE   Injectable 50 milliGRAM(s) IV Push at bedtime  enoxaparin Injectable 30 milliGRAM(s) SubCutaneous two times a day  escitalopram 20 milliGRAM(s) Oral daily  heparin  flush 100 Units/mL Injectable 100 Unit(s) IV Push every other day  heparin  flush 100 Units/mL Injectable 100 Unit(s) IV Push every other day  HYDROmorphone  Injectable 0.25 milliGRAM(s) IV Push daily  insulin lispro (HumaLOG) corrective regimen sliding scale   SubCutaneous every 6 hours  losartan 50 milliGRAM(s) Oral daily  nystatin Powder 1 Application(s) Topical two times a day  pantoprazole  Injectable 40 milliGRAM(s) IV Push daily  Parenteral Nutrition - Adult 1 Each (64 mL/Hr) TPN Continuous <Continuous>  sodium chloride 0.9% lock flush 20 milliLiter(s) IV Push once  vancomycin  IVPB 1500 milliGRAM(s) IV Intermittent every 12 hours    MEDICATIONS  (PRN):  acetaminophen   Tablet. 650 milliGRAM(s) Oral every 6 hours PRN Moderate Pain (4 - 6)  sodium chloride 0.9% lock flush 10 milliLiter(s) IV Push every 1 hour PRN After each medication administration  sodium chloride 0.9% lock flush 10 milliLiter(s) IV Push every 12 hours PRN Lumen of catheter NOT used        Vital Signs Last 24 Hrs  T(C): 36.7 (06 Oct 2017 10:06), Max: 37.1 (05 Oct 2017 21:28)  T(F): 98.1 (06 Oct 2017 10:06), Max: 98.8 (06 Oct 2017 05:02)  HR: 78 (06 Oct 2017 10:06) (78 - 90)  BP: 143/88 (06 Oct 2017 10:06) (108/84 - 143/88)  BP(mean): --  RR: 16 (06 Oct 2017 10:06) (16 - 17)  SpO2: 97% (06 Oct 2017 10:06) (95% - 97%)    PHYSICAL EXAM:  Constitutional:  no distress  Eyes: no icterus  Ear/Nose/Throat: no oral lesion, 	  Neck: supple, left sided central line  Respiratory: CTA eileen  Cardiovascular: S1S2 RRR, no murmurs  Gastrointestinal:soft, large abdominal wound is clean  Extremities:+ edema  Vascular: DP Pulse:	right normal; left normal      LABS:                        8.9    4.5   )-----------( 131      ( 06 Oct 2017 06:18 )             26.6     10-06    135  |  97  |  25<H>  ----------------------------<  125<H>  3.5   |  26  |  0.47<L>    Ca    8.8      06 Oct 2017 06:17  Phos  2.6     10-06  Mg     2.0     10-06    TPro  5.8<L>  /  Alb  2.3<L>  /  TBili  1.1  /  DBili  x   /  AST  32  /  ALT  45  /  AlkPhos  273<H>  10-06          MICROBIOLOGY:  Culture - Blood (10.03.17 @ 12:14)    -  Methicillin resistant Staphylococcus aureus (MRSA): [facility] notified  10/04/2017 10:11:36 (Ms. CHIKIS Torres RN)    Gram Stain:   Aerobic and Anaerobic Bottle: Gram Positive Cocci in Clusters  Result called to and read back by_ Ms. CHIKIS Torres RN  10/04/2017 10:11:36  ***Blood Panel PCR results on this specimen are available  approximately 3 hours after the Gram stain result.***  Gram stain, PCR, and/or culture results may not always  correspond due to difference in methodologies.  ************************************************************  This PCR assay was performed using [employer].  The following targets are tested for: Enterococcus,  vancomycin resistant enterococci, Listeria monocytogenes,  coagulase negative staphylococci, S. aureus,  methicillin resistant S. aureus, Streptococcus agalactiae  (Group B), S. pneumoniae, S. pyogenes (Group A),  Acinetobacter baumannii, Enterobacter cloacae, E. coli,  Klebsiella oxytoca, K. pneumoniae, Proteus sp.,  Serratia marcescens, Haemophilus influenzae,  Neisseria meningitidis, Pseudomonas aeruginosa, Candida  albicans, C. glabrata, C krusei, C parapsilosis,  C. tropicalis and the KPC resistance gene.    Specimen Source: .Blood Blood    Organism: Blood Culture PCR    Culture Results:   Growth in aerobic and anaerobic bottles: Staphylococcus aureus  presumptive Methicillin resistant  Confirmation to follow within 24 hours  Susceptibility to follow.  Culture in progress    Organism Identification: Blood Culture PCR    Method Type: PCR        RADIOLOGY & ADDITIONAL STUDIES:

## 2017-10-06 NOTE — ADVANCED PRACTICE NURSE CONSULT - ASSESSMENT
Surgical team member, Clemencia present and observed WOCN change wound manager. Abdominal wound with pale red, non-granulating tissue; fistula on left lower quadrant of wound draining large amount of yellow effluent. Peristomal skin denuded from 3-5 o'clock.    Applied stoma powder and liquid skin barrier to denuded periwound skin; liquid skin barrier to periwound, Capo rings to periwound and Coloplast Wound Manager.   Patient tolerated dressing change without difficulty.

## 2017-10-06 NOTE — BEHAVIORAL HEALTH ASSESSMENT NOTE - NSBHSOCIALHXDETAILSFT_PSY_A_CORE
Patient has been a  for about 20 years, soon to retire on disability in December. Patient lives at home, on [place], with her . One son, one daughter, one granddaughter.

## 2017-10-07 LAB
-  AMPICILLIN/SULBACTAM: SIGNIFICANT CHANGE UP
-  AMPICILLIN: SIGNIFICANT CHANGE UP
-  AMPICILLIN: SIGNIFICANT CHANGE UP
-  CEFAZOLIN: SIGNIFICANT CHANGE UP
-  CEFAZOLIN: SIGNIFICANT CHANGE UP
-  CEFTRIAXONE: SIGNIFICANT CHANGE UP
-  CIPROFLOXACIN: SIGNIFICANT CHANGE UP
-  CLINDAMYCIN: SIGNIFICANT CHANGE UP
-  DAPTOMYCIN: SIGNIFICANT CHANGE UP
-  ERYTHROMYCIN: SIGNIFICANT CHANGE UP
-  GENTAMICIN: SIGNIFICANT CHANGE UP
-  LINEZOLID: SIGNIFICANT CHANGE UP
-  LINEZOLID: SIGNIFICANT CHANGE UP
-  OXACILLIN: SIGNIFICANT CHANGE UP
-  PENICILLIN: SIGNIFICANT CHANGE UP
-  PIPERACILLIN/TAZOBACTAM: SIGNIFICANT CHANGE UP
-  RIFAMPIN: SIGNIFICANT CHANGE UP
-  TETRACYCLINE: SIGNIFICANT CHANGE UP
-  TOBRAMYCIN: SIGNIFICANT CHANGE UP
-  TRIMETHOPRIM/SULFAMETHOXAZOLE: SIGNIFICANT CHANGE UP
-  TRIMETHOPRIM/SULFAMETHOXAZOLE: SIGNIFICANT CHANGE UP
ANION GAP SERPL CALC-SCNC: 9 MMOL/L — SIGNIFICANT CHANGE UP (ref 5–17)
BUN SERPL-MCNC: 20 MG/DL — SIGNIFICANT CHANGE UP (ref 7–23)
CALCIUM SERPL-MCNC: 8.7 MG/DL — SIGNIFICANT CHANGE UP (ref 8.4–10.5)
CHLORIDE SERPL-SCNC: 102 MMOL/L — SIGNIFICANT CHANGE UP (ref 96–108)
CO2 SERPL-SCNC: 28 MMOL/L — SIGNIFICANT CHANGE UP (ref 22–31)
CREAT SERPL-MCNC: 0.58 MG/DL — SIGNIFICANT CHANGE UP (ref 0.5–1.3)
CULTURE RESULTS: SIGNIFICANT CHANGE UP
GLUCOSE SERPL-MCNC: 137 MG/DL — HIGH (ref 70–99)
HCT VFR BLD CALC: 28.1 % — LOW (ref 34.5–45)
HGB BLD-MCNC: 9.1 G/DL — LOW (ref 11.5–15.5)
MAGNESIUM SERPL-MCNC: 2 MG/DL — SIGNIFICANT CHANGE UP (ref 1.6–2.6)
MCHC RBC-ENTMCNC: 29.4 PG — SIGNIFICANT CHANGE UP (ref 27–34)
MCHC RBC-ENTMCNC: 32.4 G/DL — SIGNIFICANT CHANGE UP (ref 32–36)
MCV RBC AUTO: 90.9 FL — SIGNIFICANT CHANGE UP (ref 80–100)
METHOD TYPE: SIGNIFICANT CHANGE UP
PHOSPHATE SERPL-MCNC: 3.3 MG/DL — SIGNIFICANT CHANGE UP (ref 2.5–4.5)
PLATELET # BLD AUTO: 150 K/UL — SIGNIFICANT CHANGE UP (ref 150–400)
POTASSIUM SERPL-MCNC: 4.2 MMOL/L — SIGNIFICANT CHANGE UP (ref 3.5–5.3)
POTASSIUM SERPL-SCNC: 4.2 MMOL/L — SIGNIFICANT CHANGE UP (ref 3.5–5.3)
RBC # BLD: 3.09 M/UL — LOW (ref 3.8–5.2)
RBC # FLD: 18.3 % — HIGH (ref 10.3–16.9)
SODIUM SERPL-SCNC: 139 MMOL/L — SIGNIFICANT CHANGE UP (ref 135–145)
SPECIMEN SOURCE: SIGNIFICANT CHANGE UP
WBC # BLD: 4.9 K/UL — SIGNIFICANT CHANGE UP (ref 3.8–10.5)
WBC # FLD AUTO: 4.9 K/UL — SIGNIFICANT CHANGE UP (ref 3.8–10.5)

## 2017-10-07 PROCEDURE — 99233 SBSQ HOSP IP/OBS HIGH 50: CPT

## 2017-10-07 RX ORDER — ELECTROLYTE SOLUTION,INJ
1 VIAL (ML) INTRAVENOUS
Qty: 0 | Refills: 0 | Status: DISCONTINUED | OUTPATIENT
Start: 2017-10-07 | End: 2017-10-07

## 2017-10-07 RX ORDER — DIPHENHYDRAMINE HCL 50 MG
25 CAPSULE ORAL ONCE
Qty: 0 | Refills: 0 | Status: DISCONTINUED | OUTPATIENT
Start: 2017-10-07 | End: 2017-10-07

## 2017-10-07 RX ADMIN — Medication 300 MILLIGRAM(S): at 15:01

## 2017-10-07 RX ADMIN — HYDROMORPHONE HYDROCHLORIDE 0.25 MILLIGRAM(S): 2 INJECTION INTRAMUSCULAR; INTRAVENOUS; SUBCUTANEOUS at 13:14

## 2017-10-07 RX ADMIN — Medication 1 EACH: at 17:49

## 2017-10-07 RX ADMIN — Medication 650 MILLIGRAM(S): at 15:01

## 2017-10-07 RX ADMIN — LOSARTAN POTASSIUM 50 MILLIGRAM(S): 100 TABLET, FILM COATED ORAL at 07:01

## 2017-10-07 RX ADMIN — Medication 650 MILLIGRAM(S): at 07:01

## 2017-10-07 RX ADMIN — Medication 2: at 17:48

## 2017-10-07 RX ADMIN — Medication 2: at 08:48

## 2017-10-07 RX ADMIN — ESCITALOPRAM OXALATE 20 MILLIGRAM(S): 10 TABLET, FILM COATED ORAL at 21:44

## 2017-10-07 RX ADMIN — ENOXAPARIN SODIUM 30 MILLIGRAM(S): 100 INJECTION SUBCUTANEOUS at 07:00

## 2017-10-07 RX ADMIN — Medication 1 APPLICATION(S): at 07:01

## 2017-10-07 RX ADMIN — Medication 50 MILLIGRAM(S): at 21:43

## 2017-10-07 RX ADMIN — NYSTATIN CREAM 1 APPLICATION(S): 100000 CREAM TOPICAL at 07:01

## 2017-10-07 RX ADMIN — Medication 650 MILLIGRAM(S): at 07:30

## 2017-10-07 RX ADMIN — HYDROMORPHONE HYDROCHLORIDE 0.25 MILLIGRAM(S): 2 INJECTION INTRAMUSCULAR; INTRAVENOUS; SUBCUTANEOUS at 13:31

## 2017-10-07 RX ADMIN — Medication 650 MILLIGRAM(S): at 00:07

## 2017-10-07 RX ADMIN — PANTOPRAZOLE SODIUM 40 MILLIGRAM(S): 20 TABLET, DELAYED RELEASE ORAL at 07:01

## 2017-10-07 RX ADMIN — Medication 300 MILLIGRAM(S): at 03:26

## 2017-10-07 RX ADMIN — Medication 5 MILLIGRAM(S): at 21:43

## 2017-10-07 RX ADMIN — NYSTATIN CREAM 1 APPLICATION(S): 100000 CREAM TOPICAL at 17:50

## 2017-10-07 RX ADMIN — Medication 5 MILLIGRAM(S): at 03:26

## 2017-10-07 RX ADMIN — Medication 650 MILLIGRAM(S): at 16:01

## 2017-10-07 RX ADMIN — ENOXAPARIN SODIUM 30 MILLIGRAM(S): 100 INJECTION SUBCUTANEOUS at 17:49

## 2017-10-07 NOTE — PROGRESS NOTE ADULT - SUBJECTIVE AND OBJECTIVE BOX
O/N: BRAYDEN, VSS  10/6 : VAC changed, as per caitlin, can get protein bar if she walks around and water if sit on a chair. vanc trough 19 . cont current dose.     STATUS POST:   7/24: SBR resection, fistula resection, revision of esophagogegunostomy drain through esophageal hiatus in R mediastinum  7/29: IR drainage of 800 cc of succus  7/29: Ex-lap, SB anastamosis in BP limb breakdown with succus throughout abdomen  8/1: abd washout, drainage of abscess, biologic mesh placement, closure of abdominal wall, vac and retention suture  8/7: abd washout, mesh removal, frozen abd noted, LLQ CHECO replaced with benson drain  8/10: leak noted from previous anastamosis site on L side, mid abdomen malecot drain placed in enterotomy, JPs x 2 placed, necrotic fascia debrided, vicryl mesh sutured to fascia circumferentially, xeroform over mesh then vac dressing placed	     O/N: BRAYDEN, VSS  10/6 : VAC changed, as per caitlin, can get protein bar if she walks around and water if sit on a chair. vanc trough 19 . cont current dose.     STATUS POST:   7/24: SBR resection, fistula resection, revision of esophagogegunostomy drain through esophageal hiatus in R mediastinum  7/29: IR drainage of 800 cc of succus  7/29: Ex-lap, SB anastamosis in BP limb breakdown with succus throughout abdomen  8/1: abd washout, drainage of abscess, biologic mesh placement, closure of abdominal wall, vac and retention suture  8/7: abd washout, mesh removal, frozen abd noted, LLQ CHECO replaced with benson drain  8/10: leak noted from previous anastamosis site on L side, mid abdomen malecot drain placed in enterotomy, JPs x 2 placed, necrotic fascia debrided, vicryl mesh sutured to fascia circumferentially, xeroform over mesh then vac dressing placed	    STATUS POST:       SUBJECTIVE: Patient seen and examined bedside by chief resident.    enoxaparin Injectable 30 milliGRAM(s) SubCutaneous two times a day  heparin  flush 100 Units/mL Injectable 100 Unit(s) IV Push every other day  heparin  flush 100 Units/mL Injectable 100 Unit(s) IV Push every other day  losartan 50 milliGRAM(s) Oral daily  vancomycin  IVPB 1500 milliGRAM(s) IV Intermittent every 12 hours      Vital Signs Last 24 Hrs  T(C): 36.3 (07 Oct 2017 05:36), Max: 36.7 (06 Oct 2017 10:06)  T(F): 97.3 (07 Oct 2017 05:36), Max: 98.1 (06 Oct 2017 10:06)  HR: 80 (07 Oct 2017 05:36) (74 - 80)  BP: 168/101 (07 Oct 2017 05:36) (138/84 - 168/101)  BP(mean): --  RR: 16 (07 Oct 2017 05:36) (16 - 16)  SpO2: 94% (07 Oct 2017 05:36) (94% - 97%)  I&O's Detail    05 Oct 2017 07:01  -  06 Oct 2017 07:00  --------------------------------------------------------  IN:    TPN (Total Parenteral Nutrition): 512 mL  Total IN: 512 mL    OUT:    Drain: 150 mL    Voided: 750 mL  Total OUT: 900 mL    Total NET: -388 mL      06 Oct 2017 07:01  -  07 Oct 2017 05:58  --------------------------------------------------------  IN:    TPN (Total Parenteral Nutrition): 768 mL  Total IN: 768 mL    OUT:    Drain: 150 mL    Voided: 2259 mL  Total OUT: 2409 mL    Total NET: -1641 mL          General: NAD, resting comfortably in bed  C/V: NSR  Pulm: Nonlabored breathing, no respiratory distress  Abd: soft, NT/ND, wound clean  Extrem: edematous        LABS:                        8.9    4.5   )-----------( 131      ( 06 Oct 2017 06:18 )             26.6     10-06    135  |  97  |  25<H>  ----------------------------<  125<H>  3.5   |  26  |  0.47<L>    Ca    8.8      06 Oct 2017 06:17  Phos  2.6     10-06  Mg     2.0     10-06    TPro  5.8<L>  /  Alb  2.3<L>  /  TBili  1.1  /  DBili  x   /  AST  32  /  ALT  45  /  AlkPhos  273<H>  10-06          RADIOLOGY & ADDITIONAL STUDIES:

## 2017-10-07 NOTE — PROGRESS NOTE ADULT - ASSESSMENT
57 F s/p  SBR, fistula resection, esophagojejunostomy revision w/ post-op course complicated by RTOR for distal anastomosis breakdown, ATN, acute respiratory failure, & septic shock, since resolved.  NPO/TPN/IVF  Pain/nausea control - only benadryl at midnight and dilaudid during VAC change  ISS  L IJ  Nystatin powder  SCDs, lovenox, OOBA  f/u psych  Drains: Malecot in enterotomy site, wound vac to midline  Wounds: Midline xiphoid to pubis incision; DTI & stage 2 pressure ulcer noted.

## 2017-10-07 NOTE — PROGRESS NOTE ADULT - ASSESSMENT
IMPRESSION:  MRSA bacteremia secondary to central line infection    Recommend:  1.  Continue Vancomycin at 1500 mg IV q12 (trough is appropriate)  2.  She will need 4 weeks of IV antibiotics from the date of the first negative blood culture.  Please check blood culture to document clearance

## 2017-10-07 NOTE — PROGRESS NOTE ADULT - SUBJECTIVE AND OBJECTIVE BOX
INTERVAL HPI/OVERNIGHT EVENTS:    Patient was seen and examined.  Events noted.  No complaints    CONSTITUTIONAL:  Negative fever or chills, feels well, good appetite  EYES:  Negative  blurry vision or double vision  CARDIOVASCULAR:  Negative for chest pain or palpitations  RESPIRATORY:  Negative for cough, wheezing, or SOB   GASTROINTESTINAL:  Negative for nausea, vomiting, diarrhea, constipation, or abdominal pain  GENITOURINARY:  Negative frequency, urgency or dysuria  NEUROLOGIC:  No headache, confusion, dizziness, lightheadedness      ANTIBIOTICS/RELEVANT:    MEDICATIONS  (STANDING):  calcitriol Injectable 1 MICROGram(s) IV Push <User Schedule>  collagenase Ointment 1 Application(s) Topical daily  cyanocobalamin Injectable 1000 MICROGram(s) SubCutaneous every 7 days  dextrose 5%. 1000 milliLiter(s) (50 mL/Hr) IV Continuous <Continuous>  dextrose 50% Injectable 25 Gram(s) IV Push once  dextrose 50% Injectable 12.5 Gram(s) IV Push once  diazepam    Tablet 5 milliGRAM(s) Oral at bedtime  diphenhydrAMINE   Injectable 50 milliGRAM(s) IV Push at bedtime  enoxaparin Injectable 30 milliGRAM(s) SubCutaneous two times a day  escitalopram 20 milliGRAM(s) Oral daily  heparin  flush 100 Units/mL Injectable 100 Unit(s) IV Push every other day  heparin  flush 100 Units/mL Injectable 100 Unit(s) IV Push every other day  HYDROmorphone  Injectable 0.25 milliGRAM(s) IV Push daily  insulin lispro (HumaLOG) corrective regimen sliding scale   SubCutaneous every 6 hours  losartan 50 milliGRAM(s) Oral daily  nystatin Powder 1 Application(s) Topical two times a day  pantoprazole  Injectable 40 milliGRAM(s) IV Push daily  Parenteral Nutrition - Adult 1 Each (64 mL/Hr) TPN Continuous <Continuous>  sodium chloride 0.9% lock flush 20 milliLiter(s) IV Push once  vancomycin  IVPB 1500 milliGRAM(s) IV Intermittent every 12 hours    MEDICATIONS  (PRN):  acetaminophen   Tablet. 650 milliGRAM(s) Oral every 6 hours PRN Moderate Pain (4 - 6)  sodium chloride 0.9% lock flush 10 milliLiter(s) IV Push every 1 hour PRN After each medication administration  sodium chloride 0.9% lock flush 10 milliLiter(s) IV Push every 12 hours PRN Lumen of catheter NOT used        Vital Signs Last 24 Hrs  T(C): 36.2 (07 Oct 2017 09:21), Max: 36.7 (06 Oct 2017 10:06)  T(F): 97.1 (07 Oct 2017 09:21), Max: 98.1 (06 Oct 2017 10:06)  HR: 77 (07 Oct 2017 09:21) (74 - 80)  BP: 130/84 (07 Oct 2017 09:21) (130/84 - 168/101)  BP(mean): --  RR: 17 (07 Oct 2017 09:21) (16 - 17)  SpO2: 95% (07 Oct 2017 09:21) (94% - 97%)    PHYSICAL EXAM:  Constitutional:Well-developed, well nourished  Eyes:LC, EOMI  Ear/Nose/Throat: no oral lesion, no sinus tenderness on percussion	  Neck:no JVD, no lymphadenopathy, supple  Respiratory: CTA eileen  Cardiovascular: S1S2 RRR, no murmurs  Gastrointestinal:soft, (+) BS, no HSM  Extremities:no e/e/c  Vascular: DP Pulse:	right normal; left normal      LABS:                        8.9    4.5   )-----------( 131      ( 06 Oct 2017 06:18 )             26.6     10-06    135  |  97  |  25<H>  ----------------------------<  125<H>  3.5   |  26  |  0.47<L>    Ca    8.8      06 Oct 2017 06:17  Phos  2.6     10-06  Mg     2.0     10-06    TPro  5.8<L>  /  Alb  2.3<L>  /  TBili  1.1  /  DBili  x   /  AST  32  /  ALT  45  /  AlkPhos  273<H>  10-06          MICROBIOLOGY:  Culture - Blood (10.03.17 @ 12:14)    -  Erythromycin: R >4    -  Clindamycin: R <=0.5    -  Linezolid: S 4    -  Oxacillin: R >2    -  RIF- Rifampin: S <=1    -  Tetra/Doxy: S <=4    -  Cefazolin: R 16    Gram Stain:   Aerobic and Anaerobic Bottle: Gram Positive Cocci in Clusters  Result called to and read back by_ Ms. CHIKIS Torres RN  10/04/2017 10:11:36  ***Blood Panel PCR results on this specimen are available  approximately 3 hours after the Gram stain result.***  Gram stain, PCR, and/or culture results may not always  correspond due to difference in methodologies.  ************************************************************  This PCR assay was performed using [employer].  The following targets are tested for: Enterococcus,  vancomycin resistant enterococci, Listeria monocytogenes,  coagulase negative staphylococci, S. aureus,  methicillin resistant S. aureus, Streptococcus agalactiae  (Group B), S. pneumoniae, S. pyogenes (Group A),  Acinetobacter baumannii, Enterobacter cloacae, E. coli,  Klebsiella oxytoca, K. pneumoniae, Proteus sp.,  Serratia marcescens, Haemophilus influenzae,  Neisseria meningitidis, Pseudomonas aeruginosa, Candida  albicans, C. glabrata, C krusei, C parapsilosis,  C. tropicalis and the KPC resistance gene.    -  Methicillin resistant Staphylococcus aureus (MRSA): [facility] notified  10/04/2017 10:11:36 (Ms. CHIKIS Torres RN)    -  Penicillin: R >8    -  Daptomycin: S <=0.5    -  Vancomycin: S 2    -  Trimethoprim/Sulfamethoxazole: S <=0.5/9.5    Specimen Source: .Blood Blood    Organism: Blood Culture PCR    Organism: Methicillin resistant Staphylococcus aureus    Organism: Methicillin resistant Staphylococcus aureus    Culture Results:   Growth in aerobic and anaerobic bottles: Methicillin resistant  Staphylococcus aureus  Result called to and read back by_ ROLANDO WAS NOTIFIED    Organism Identification: Blood Culture PCR  Methicillin resistant Staphylococcus aureus  Methicillin resistant Staphylococcus aureus    Method Type: PCR    Method Type: ALISHA    Method Type: ETEST          RADIOLOGY & ADDITIONAL STUDIES:

## 2017-10-08 LAB
-  AMPICILLIN/SULBACTAM: SIGNIFICANT CHANGE UP
-  AMPICILLIN: SIGNIFICANT CHANGE UP
-  CEFAZOLIN: SIGNIFICANT CHANGE UP
-  CEFAZOLIN: SIGNIFICANT CHANGE UP
-  CEFTRIAXONE: SIGNIFICANT CHANGE UP
-  CIPROFLOXACIN: SIGNIFICANT CHANGE UP
-  CLINDAMYCIN: SIGNIFICANT CHANGE UP
-  DAPTOMYCIN: SIGNIFICANT CHANGE UP
-  ERYTHROMYCIN: SIGNIFICANT CHANGE UP
-  GENTAMICIN: SIGNIFICANT CHANGE UP
-  IMIPENEM: SIGNIFICANT CHANGE UP
-  LINEZOLID: SIGNIFICANT CHANGE UP
-  OXACILLIN: SIGNIFICANT CHANGE UP
-  PENICILLIN: SIGNIFICANT CHANGE UP
-  PIPERACILLIN/TAZOBACTAM: SIGNIFICANT CHANGE UP
-  RIFAMPIN: SIGNIFICANT CHANGE UP
-  TETRACYCLINE: SIGNIFICANT CHANGE UP
-  TOBRAMYCIN: SIGNIFICANT CHANGE UP
-  TRIMETHOPRIM/SULFAMETHOXAZOLE: SIGNIFICANT CHANGE UP
-  TRIMETHOPRIM/SULFAMETHOXAZOLE: SIGNIFICANT CHANGE UP
-  VANCOMYCIN: SIGNIFICANT CHANGE UP
ANION GAP SERPL CALC-SCNC: 12 MMOL/L — SIGNIFICANT CHANGE UP (ref 5–17)
BUN SERPL-MCNC: 18 MG/DL — SIGNIFICANT CHANGE UP (ref 7–23)
CALCIUM SERPL-MCNC: 8.9 MG/DL — SIGNIFICANT CHANGE UP (ref 8.4–10.5)
CHLORIDE SERPL-SCNC: 101 MMOL/L — SIGNIFICANT CHANGE UP (ref 96–108)
CO2 SERPL-SCNC: 25 MMOL/L — SIGNIFICANT CHANGE UP (ref 22–31)
CREAT SERPL-MCNC: 0.48 MG/DL — LOW (ref 0.5–1.3)
CULTURE RESULTS: SIGNIFICANT CHANGE UP
CULTURE RESULTS: SIGNIFICANT CHANGE UP
GLUCOSE SERPL-MCNC: 152 MG/DL — HIGH (ref 70–99)
MAGNESIUM SERPL-MCNC: 2 MG/DL — SIGNIFICANT CHANGE UP (ref 1.6–2.6)
METHOD TYPE: SIGNIFICANT CHANGE UP
METHOD TYPE: SIGNIFICANT CHANGE UP
ORGANISM # SPEC MICROSCOPIC CNT: SIGNIFICANT CHANGE UP
PHOSPHATE SERPL-MCNC: 3.4 MG/DL — SIGNIFICANT CHANGE UP (ref 2.5–4.5)
POTASSIUM SERPL-MCNC: 4.2 MMOL/L — SIGNIFICANT CHANGE UP (ref 3.5–5.3)
POTASSIUM SERPL-SCNC: 4.2 MMOL/L — SIGNIFICANT CHANGE UP (ref 3.5–5.3)
SODIUM SERPL-SCNC: 138 MMOL/L — SIGNIFICANT CHANGE UP (ref 135–145)
SPECIMEN SOURCE: SIGNIFICANT CHANGE UP
SPECIMEN SOURCE: SIGNIFICANT CHANGE UP
VANCOMYCIN TROUGH SERPL-MCNC: 18.7 UG/ML — SIGNIFICANT CHANGE UP (ref 10–20)
VANCOMYCIN TROUGH SERPL-MCNC: 24.2 UG/ML — HIGH (ref 10–20)

## 2017-10-08 RX ORDER — ELECTROLYTE SOLUTION,INJ
1 VIAL (ML) INTRAVENOUS
Qty: 0 | Refills: 0 | Status: DISCONTINUED | OUTPATIENT
Start: 2017-10-08 | End: 2017-10-08

## 2017-10-08 RX ORDER — VANCOMYCIN HCL 1 G
VIAL (EA) INTRAVENOUS
Qty: 0 | Refills: 0 | Status: DISCONTINUED | OUTPATIENT
Start: 2017-10-08 | End: 2017-10-08

## 2017-10-08 RX ORDER — VANCOMYCIN HCL 1 G
1250 VIAL (EA) INTRAVENOUS EVERY 12 HOURS
Qty: 0 | Refills: 0 | Status: DISCONTINUED | OUTPATIENT
Start: 2017-10-08 | End: 2017-10-08

## 2017-10-08 RX ORDER — HYDROMORPHONE HYDROCHLORIDE 2 MG/ML
0.25 INJECTION INTRAMUSCULAR; INTRAVENOUS; SUBCUTANEOUS ONCE
Qty: 0 | Refills: 0 | Status: DISCONTINUED | OUTPATIENT
Start: 2017-10-08 | End: 2017-10-08

## 2017-10-08 RX ORDER — VANCOMYCIN HCL 1 G
1250 VIAL (EA) INTRAVENOUS EVERY 12 HOURS
Qty: 0 | Refills: 0 | Status: DISCONTINUED | OUTPATIENT
Start: 2017-10-08 | End: 2017-10-09

## 2017-10-08 RX ORDER — I.V. FAT EMULSION 20 G/100ML
50 EMULSION INTRAVENOUS
Qty: 0 | Refills: 0 | Status: DISCONTINUED | OUTPATIENT
Start: 2017-10-08 | End: 2017-10-08

## 2017-10-08 RX ADMIN — Medication 1 EACH: at 18:06

## 2017-10-08 RX ADMIN — Medication 650 MILLIGRAM(S): at 01:10

## 2017-10-08 RX ADMIN — Medication 1 APPLICATION(S): at 06:14

## 2017-10-08 RX ADMIN — Medication 166.67 MILLIGRAM(S): at 11:19

## 2017-10-08 RX ADMIN — LOSARTAN POTASSIUM 50 MILLIGRAM(S): 100 TABLET, FILM COATED ORAL at 06:14

## 2017-10-08 RX ADMIN — Medication 650 MILLIGRAM(S): at 12:36

## 2017-10-08 RX ADMIN — Medication 50 MILLIGRAM(S): at 21:14

## 2017-10-08 RX ADMIN — HYDROMORPHONE HYDROCHLORIDE 0.25 MILLIGRAM(S): 2 INJECTION INTRAMUSCULAR; INTRAVENOUS; SUBCUTANEOUS at 18:28

## 2017-10-08 RX ADMIN — Medication 5 MILLIGRAM(S): at 21:14

## 2017-10-08 RX ADMIN — Medication 650 MILLIGRAM(S): at 19:35

## 2017-10-08 RX ADMIN — ENOXAPARIN SODIUM 30 MILLIGRAM(S): 100 INJECTION SUBCUTANEOUS at 18:06

## 2017-10-08 RX ADMIN — ENOXAPARIN SODIUM 30 MILLIGRAM(S): 100 INJECTION SUBCUTANEOUS at 06:14

## 2017-10-08 RX ADMIN — HYDROMORPHONE HYDROCHLORIDE 0.25 MILLIGRAM(S): 2 INJECTION INTRAMUSCULAR; INTRAVENOUS; SUBCUTANEOUS at 11:45

## 2017-10-08 RX ADMIN — ESCITALOPRAM OXALATE 20 MILLIGRAM(S): 10 TABLET, FILM COATED ORAL at 21:14

## 2017-10-08 RX ADMIN — Medication 650 MILLIGRAM(S): at 06:17

## 2017-10-08 RX ADMIN — NYSTATIN CREAM 1 APPLICATION(S): 100000 CREAM TOPICAL at 06:14

## 2017-10-08 RX ADMIN — PANTOPRAZOLE SODIUM 40 MILLIGRAM(S): 20 TABLET, DELAYED RELEASE ORAL at 06:16

## 2017-10-08 RX ADMIN — Medication 100 UNIT(S): at 11:18

## 2017-10-08 RX ADMIN — HYDROMORPHONE HYDROCHLORIDE 0.25 MILLIGRAM(S): 2 INJECTION INTRAMUSCULAR; INTRAVENOUS; SUBCUTANEOUS at 18:06

## 2017-10-08 RX ADMIN — HYDROMORPHONE HYDROCHLORIDE 0.25 MILLIGRAM(S): 2 INJECTION INTRAMUSCULAR; INTRAVENOUS; SUBCUTANEOUS at 11:19

## 2017-10-08 RX ADMIN — Medication 650 MILLIGRAM(S): at 20:35

## 2017-10-08 RX ADMIN — Medication 166.67 MILLIGRAM(S): at 21:15

## 2017-10-08 RX ADMIN — Medication 2: at 12:35

## 2017-10-08 RX ADMIN — Medication 650 MILLIGRAM(S): at 00:11

## 2017-10-08 RX ADMIN — NYSTATIN CREAM 1 APPLICATION(S): 100000 CREAM TOPICAL at 17:25

## 2017-10-08 NOTE — PROGRESS NOTE ADULT - SUBJECTIVE AND OBJECTIVE BOX
O/N: BRAYDEN, VSS, vanc level 24 O/N: BRAYDEN, VSS, vanc level 24    STATUS POST:    7/24: SBR resection, fistula resection, revision of esophagogegunostomy drain through esophageal hiatus in R mediastinum  7/29: IR drainage of 800 cc of succus  7/29: Ex-lap, SB anastamosis in BP limb breakdown with succus throughout abdomen  8/1: abd washout, drainage of abscess, biologic mesh placement, closure of abdominal wall, vac and retention suture  8/7: abd washout, mesh removal, frozen abd noted, LLQ CHECO replaced with benson drain  8/10: leak noted from previous anastamosis site on L side, mid abdomen malecot drain placed in enterotomy, JPs x 2 placed, necrotic fascia debrided, vicryl mesh sutured to fascia circumferentially, xeroform over mesh then vac dressing placed	    SUBJECTIVE: Patient seen and examined bedside by chief resident.    enoxaparin Injectable 30 milliGRAM(s) SubCutaneous two times a day  heparin  flush 100 Units/mL Injectable 100 Unit(s) IV Push every other day  heparin  flush 100 Units/mL Injectable 100 Unit(s) IV Push every other day  losartan 50 milliGRAM(s) Oral daily  vancomycin  IVPB 1250 milliGRAM(s) IV Intermittent every 12 hours      Vital Signs Last 24 Hrs  T(C): 36.6 (08 Oct 2017 05:45), Max: 36.9 (07 Oct 2017 17:25)  T(F): 97.8 (08 Oct 2017 05:45), Max: 98.5 (07 Oct 2017 17:25)  HR: 71 (08 Oct 2017 05:45) (71 - 84)  BP: 164/92 (08 Oct 2017 05:45) (142/83 - 167/96)  BP(mean): --  RR: 16 (08 Oct 2017 05:45) (16 - 17)  SpO2: 92% (08 Oct 2017 06:15) (91% - 95%)  I&O's Detail    07 Oct 2017 07:01  -  08 Oct 2017 07:00  --------------------------------------------------------  IN:    TPN (Total Parenteral Nutrition): 1536 mL  Total IN: 1536 mL    OUT:    Colostomy: 150 mL    Voided: 1700 mL  Total OUT: 1850 mL    Total NET: -314 mL          General: NAD, resting comfortably in bed  C/V: NSR  Pulm: Nonlabored breathing, no respiratory distress  Abd: soft, open with fistula bag in place  Extrem: WWP,  SCDs in place        LABS:                        9.1    4.9   )-----------( 150      ( 07 Oct 2017 11:21 )             28.1     10-08    138  |  101  |  18  ----------------------------<  152<H>  4.2   |  25  |  0.48<L>    Ca    8.9      08 Oct 2017 07:54  Phos  3.4     10-08  Mg     2.0     10-08

## 2017-10-08 NOTE — PROGRESS NOTE ADULT - ASSESSMENT
57 F s/p  SBR, fistula resection, esophagojejunostomy revision w/ post-op course complicated by RTOR for distal anastomosis breakdown, ATN, acute respiratory failure, & septic shock, since resolved.  NPO/TPN/IVF  Pain/nausea control - only benadryl at midnight and dilaudid during VAC change  ISS  L IJ  Nystatin powder  SCDs, lovenox, OOBA  f/u psych  Drains: Malecot in enterotomy site, wound vac to midline  Wounds: Midline xiphoid to pubis incision; DTI & stage 2 pressure ulcer noted. 57 F s/p  SBR, fistula resection, esophagojejunostomy revision w/ post-op course complicated by RTOR for distal anastomosis breakdown, ATN, acute respiratory failure, & septic shock, since resolved.  NPO/TPN/IVF  Pain/nausea control  ISS  L IJ  Nystatin powder  SCDs, lovenox, OOBA  f/u psych  Drains: Malecot in enterotomy site, wound vac to midline  Wounds: Midline xiphoid to pubis incision; DTI & stage 2 pressure ulcer noted.

## 2017-10-09 LAB
-  VANCOMYCIN: SIGNIFICANT CHANGE UP
ANION GAP SERPL CALC-SCNC: 13 MMOL/L — SIGNIFICANT CHANGE UP (ref 5–17)
BUN SERPL-MCNC: 18 MG/DL — SIGNIFICANT CHANGE UP (ref 7–23)
CALCIUM SERPL-MCNC: 9.3 MG/DL — SIGNIFICANT CHANGE UP (ref 8.4–10.5)
CHLORIDE SERPL-SCNC: 99 MMOL/L — SIGNIFICANT CHANGE UP (ref 96–108)
CO2 SERPL-SCNC: 24 MMOL/L — SIGNIFICANT CHANGE UP (ref 22–31)
CREAT SERPL-MCNC: 0.47 MG/DL — LOW (ref 0.5–1.3)
CULTURE RESULTS: SIGNIFICANT CHANGE UP
GLUCOSE SERPL-MCNC: 137 MG/DL — HIGH (ref 70–99)
MAGNESIUM SERPL-MCNC: 2 MG/DL — SIGNIFICANT CHANGE UP (ref 1.6–2.6)
ORGANISM # SPEC MICROSCOPIC CNT: SIGNIFICANT CHANGE UP
PHOSPHATE SERPL-MCNC: 3.8 MG/DL — SIGNIFICANT CHANGE UP (ref 2.5–4.5)
POTASSIUM SERPL-MCNC: 4.5 MMOL/L — SIGNIFICANT CHANGE UP (ref 3.5–5.3)
POTASSIUM SERPL-SCNC: 4.5 MMOL/L — SIGNIFICANT CHANGE UP (ref 3.5–5.3)
SODIUM SERPL-SCNC: 136 MMOL/L — SIGNIFICANT CHANGE UP (ref 135–145)
SPECIMEN SOURCE: SIGNIFICANT CHANGE UP
TRIGL SERPL-MCNC: 226 MG/DL — HIGH (ref 10–149)
VANCOMYCIN TROUGH SERPL-MCNC: 28.1 UG/ML — CRITICAL HIGH (ref 10–20)

## 2017-10-09 PROCEDURE — 99232 SBSQ HOSP IP/OBS MODERATE 35: CPT

## 2017-10-09 PROCEDURE — 99232 SBSQ HOSP IP/OBS MODERATE 35: CPT | Mod: GC

## 2017-10-09 RX ORDER — ELECTROLYTE SOLUTION,INJ
1 VIAL (ML) INTRAVENOUS
Qty: 0 | Refills: 0 | Status: DISCONTINUED | OUTPATIENT
Start: 2017-10-09 | End: 2017-10-09

## 2017-10-09 RX ORDER — HYDROMORPHONE HYDROCHLORIDE 2 MG/ML
0.25 INJECTION INTRAMUSCULAR; INTRAVENOUS; SUBCUTANEOUS ONCE
Qty: 0 | Refills: 0 | Status: DISCONTINUED | OUTPATIENT
Start: 2017-10-09 | End: 2017-10-09

## 2017-10-09 RX ADMIN — HYDROMORPHONE HYDROCHLORIDE 0.25 MILLIGRAM(S): 2 INJECTION INTRAMUSCULAR; INTRAVENOUS; SUBCUTANEOUS at 13:01

## 2017-10-09 RX ADMIN — Medication 5 MILLIGRAM(S): at 22:53

## 2017-10-09 RX ADMIN — ESCITALOPRAM OXALATE 20 MILLIGRAM(S): 10 TABLET, FILM COATED ORAL at 22:53

## 2017-10-09 RX ADMIN — Medication 650 MILLIGRAM(S): at 01:20

## 2017-10-09 RX ADMIN — Medication 100 UNIT(S): at 16:14

## 2017-10-09 RX ADMIN — Medication 1 APPLICATION(S): at 06:52

## 2017-10-09 RX ADMIN — Medication 50 MILLIGRAM(S): at 22:53

## 2017-10-09 RX ADMIN — Medication 650 MILLIGRAM(S): at 18:22

## 2017-10-09 RX ADMIN — CALCITRIOL 1 MICROGRAM(S): 0.5 CAPSULE ORAL at 13:00

## 2017-10-09 RX ADMIN — ENOXAPARIN SODIUM 30 MILLIGRAM(S): 100 INJECTION SUBCUTANEOUS at 18:21

## 2017-10-09 RX ADMIN — PANTOPRAZOLE SODIUM 40 MILLIGRAM(S): 20 TABLET, DELAYED RELEASE ORAL at 06:52

## 2017-10-09 RX ADMIN — HYDROMORPHONE HYDROCHLORIDE 0.25 MILLIGRAM(S): 2 INJECTION INTRAMUSCULAR; INTRAVENOUS; SUBCUTANEOUS at 21:01

## 2017-10-09 RX ADMIN — HYDROMORPHONE HYDROCHLORIDE 0.25 MILLIGRAM(S): 2 INJECTION INTRAMUSCULAR; INTRAVENOUS; SUBCUTANEOUS at 22:01

## 2017-10-09 RX ADMIN — NYSTATIN CREAM 1 APPLICATION(S): 100000 CREAM TOPICAL at 18:22

## 2017-10-09 RX ADMIN — Medication 650 MILLIGRAM(S): at 01:21

## 2017-10-09 RX ADMIN — NYSTATIN CREAM 1 APPLICATION(S): 100000 CREAM TOPICAL at 06:52

## 2017-10-09 RX ADMIN — Medication 650 MILLIGRAM(S): at 07:38

## 2017-10-09 RX ADMIN — LOSARTAN POTASSIUM 50 MILLIGRAM(S): 100 TABLET, FILM COATED ORAL at 06:53

## 2017-10-09 RX ADMIN — ENOXAPARIN SODIUM 30 MILLIGRAM(S): 100 INJECTION SUBCUTANEOUS at 06:53

## 2017-10-09 RX ADMIN — HYDROMORPHONE HYDROCHLORIDE 0.25 MILLIGRAM(S): 2 INJECTION INTRAMUSCULAR; INTRAVENOUS; SUBCUTANEOUS at 13:16

## 2017-10-09 RX ADMIN — Medication 166.67 MILLIGRAM(S): at 13:00

## 2017-10-09 RX ADMIN — Medication 650 MILLIGRAM(S): at 19:22

## 2017-10-09 NOTE — PROGRESS NOTE ADULT - SUBJECTIVE AND OBJECTIVE BOX
O/N: VSS, BRAYDEN, small leakage but reinforced with tape  10/8: vanc held for high trough, repeat 18, vanc restarted, pain mgmt consulted O/N: VSS, BRAYDEN, small leakage but reinforced with tape  10/8: vanc held for high trough, repeat 18, vanc restarted, pain mgmt consulted       SUBJECTIVE: Patient seen and examined bedside by chief resident. MICHELLE and sitting on a chair over the weekend, requested to change the wound dressing today    enoxaparin Injectable 30 milliGRAM(s) SubCutaneous two times a day  heparin  flush 100 Units/mL Injectable 100 Unit(s) IV Push every other day  heparin  flush 100 Units/mL Injectable 100 Unit(s) IV Push every other day  losartan 50 milliGRAM(s) Oral daily  vancomycin  IVPB 1250 milliGRAM(s) IV Intermittent every 12 hours      Vital Signs Last 24 Hrs  T(C): 36.7 (09 Oct 2017 05:52), Max: 36.7 (09 Oct 2017 05:52)  T(F): 98.1 (09 Oct 2017 05:52), Max: 98.1 (09 Oct 2017 05:52)  HR: 75 (09 Oct 2017 05:52) (72 - 75)  BP: 142/80 (09 Oct 2017 05:52) (142/80 - 158/94)  BP(mean): --  RR: 17 (09 Oct 2017 05:52) (17 - 18)  SpO2: 93% (09 Oct 2017 05:52) (93% - 94%)  I&O's Detail    08 Oct 2017 07:01  -  09 Oct 2017 07:00  --------------------------------------------------------  IN:    Solution: 250 mL    TPN (Total Parenteral Nutrition): 1536 mL  Total IN: 1786 mL    OUT:    Drain: 150 mL  Total OUT: 150 mL    Total NET: 1636 mL          General: NAD, resting comfortably in bed  C/V: NSR  Pulm: Nonlabored breathing, no respiratory distress  Abd: soft, NT/ND. fistula dressing leaking at the side  Extrem: WWP, no edema, SCDs in place        LABS:                        9.1    4.9   )-----------( 150      ( 07 Oct 2017 11:21 )             28.1     10-09    136  |  99  |  18  ----------------------------<  137<H>  4.5   |  24  |  0.47<L>    Ca    9.3      09 Oct 2017 06:22  Phos  3.8     10-09  Mg     2.0     10-09            RADIOLOGY & ADDITIONAL STUDIES:

## 2017-10-09 NOTE — ADVANCED PRACTICE NURSE CONSULT - ASSESSMENT
Stage 3 sacral pressure injury with 100% red, granulation tissue measuring 3 cm x 1 cm x 0.2 cm with small amount of serosanguinous exudate. Unable to apply dressing since patient is incontinent of urine. Applied la based moisture barrier cream. House staff, Clemencia at bedside during assessment. Patient requires one person assist when turning in bed. Placed patient in side lying position to offload sacrum. Instructed patient regarding turning and repositioning in bed to offload sacrum and promote wound healing. The patient verbalized understanding. Stage 3 sacral pressure injury with 100% red, granulation tissue measuring 3 cm x 1 cm x 0.2 cm with small amount of serosanguinous exudate. Unable to apply dressing since patient is incontinent of urine. Applied la based moisture barrier cream. House staff, Clemencia at bedside during assessment. Patient requires one person assist when turning in bed. Placed patient in side lying position using pillow to offload sacrum. Per, RNJennyfer, patient has refused z rajesh pillow in the past.  Instructed patient regarding turning and repositioning in bed to offload sacrum and promote wound healing. The patient verbalized understanding.

## 2017-10-09 NOTE — PROGRESS NOTE ADULT - ASSESSMENT
IMPRESSION:  MRSA bacteremia secondary to infected central line.  Patient stable    Recommend:  1.  Continue vancomycin 1250 mg IV q12.  No need to check daily trough  2.  Follow up repeat blood cultures.  Will need 4 weeks IV antibiotics (day 1 = date of first negative culture)

## 2017-10-09 NOTE — ADVANCED PRACTICE NURSE CONSULT - RECOMMEDATIONS
Apply zinc based moisture barrier cream to sacrum (including pressure injury) and buttocks twice daily and prn if soiled or wet.   Use z rajesh pillow to reposition while in bed.   Discussed assessment and recommendations with Clemencia and Jennyfer CUETO. Apply zinc based moisture barrier cream to sacrum (including pressure injury) and buttocks twice daily and prn if soiled or wet.   Discussed assessment and recommendations with Clemencia and Jennyfer CUETO.

## 2017-10-09 NOTE — PROGRESS NOTE ADULT - ASSESSMENT
57 F s/p  SBR, fistula resection, esophagojejunostomy revision w/ post-op course complicated by RTOR for distal anastomosis breakdown, ATN, acute respiratory failure, & septic shock, since resolved.  NPO/TPN/IVF  Pain/nausea control - only benadryl at midnight and dilaudid during VAC change  ISS  L IJ  Nystatin powder  SCDs, lovenox, OOBA  Drains: Malecot in enterotomy site, wound vac to midline  Wounds: Midline xiphoid to pubis incision; DTI & stage 2 pressure ulcer noted. 57 F s/p  SBR, fistula resection, esophagojejunostomy revision w/ post-op course complicated by RTOR for distal anastomosis breakdown, ATN, acute respiratory failure, & septic shock, since resolved.    NPO/TPN/IVF with sips and chips  Pain/nausea control - only benadryl at midnight and dilaudid during VAC change  ISS  L IJ  Nystatin powder  SCDs, lovenox, OOBA  Change fistula dressing today  Wounds: Midline xiphoid to pubis incision; DTI & stage 2 pressure ulcer noted.

## 2017-10-09 NOTE — CHART NOTE - NSCHARTNOTEFT_GEN_A_CORE
Admitting Diagnosis:   Patient is a 57y old  Female who presents with a chief complaint of enterocutaneous fistula (24 Jul 2017 14:15)      PAST MEDICAL & SURGICAL HISTORY:  Reflux  Obesity  DVT (deep venous thrombosis): 2013 neck  Diverticulitis  Hypertension  Elective surgery: abodominal wall surgery  dec 2016  Elective surgery: NOV 2016 gastric bypass revision  Gastric bypass status for obesity: gastric sleeve, 6/2012  S/P breast biopsy: 2011, left  S/P colon resection: 2011  S/P knee surgery: repair 2009, 2011  right; left  Umbilical hernia: 2000  S/P appendectomy: 1975  S/P tonsillectomy: 1967      Current Nutrition Order:   Diet, NPO:   Except Medications  With Ice Chips/Sips of Water (10-04-17 @ 18:16)   TPN via PICC line: 254 g D, 104 g AA, 50g 20% lipids. Providing pt with 1779 kcal, 104 g protein, 1858 mL fluid. GIR = 1.77.    PO Intake: Good (%) [   ]  Fair (50-75%) [   ] Poor (<25%) [ x  ]  Pt is allowed sips of water and protein bar is OOBA per MD.    GI Issues:  No apparent GI distress at present.     Pain: Being managed     Skin Integrity:   Unstageable pressure ulcer on sacrum, new central venous catheter 10/4.   wound vac to midline abdomen, malecot in entertotomy site,  Midline xiphoid to pubis incision; DTI & stage 2 pressure ulcer noted per MD note.   Concern for new developing ulcer per provider handoff.  Now w/ MRSA bacteremia secondary to infected central line.    Labs:   10-09    136  |  99  |  18  ----------------------------<  137<H>  4.5   |  24  |  0.47<L>    Ca    9.3      09 Oct 2017 06:22  Phos  3.8     10-09  Mg     2.0     10-09      CAPILLARY BLOOD GLUCOSE  134 (09 Oct 2017 06:41)  146 (09 Oct 2017 00:04)      Medications:  MEDICATIONS  (STANDING):  calcitriol Injectable 1 MICROGram(s) IV Push <User Schedule>  collagenase Ointment 1 Application(s) Topical daily  cyanocobalamin Injectable 1000 MICROGram(s) SubCutaneous every 7 days  dextrose 5%. 1000 milliLiter(s) (50 mL/Hr) IV Continuous <Continuous>  dextrose 50% Injectable 25 Gram(s) IV Push once  dextrose 50% Injectable 12.5 Gram(s) IV Push once  diazepam    Tablet 5 milliGRAM(s) Oral at bedtime  diphenhydrAMINE   Injectable 50 milliGRAM(s) IV Push at bedtime  enoxaparin Injectable 30 milliGRAM(s) SubCutaneous two times a day  escitalopram 20 milliGRAM(s) Oral daily  heparin  flush 100 Units/mL Injectable 100 Unit(s) IV Push every other day  heparin  flush 100 Units/mL Injectable 100 Unit(s) IV Push every other day  HYDROmorphone  Injectable 0.25 milliGRAM(s) IV Push daily  insulin lispro (HumaLOG) corrective regimen sliding scale   SubCutaneous every 6 hours  losartan 50 milliGRAM(s) Oral daily  nystatin Powder 1 Application(s) Topical two times a day  pantoprazole  Injectable 40 milliGRAM(s) IV Push daily  Parenteral Nutrition - Adult 1 Each (64 mL/Hr) TPN Continuous <Continuous>  Parenteral Nutrition - Adult 1 Each (64 mL/Hr) TPN Continuous <Continuous>  sodium chloride 0.9% lock flush 20 milliLiter(s) IV Push once  vancomycin  IVPB 1250 milliGRAM(s) IV Intermittent every 12 hours    MEDICATIONS  (PRN):  acetaminophen   Tablet. 650 milliGRAM(s) Oral every 6 hours PRN Moderate Pain (4 - 6)  sodium chloride 0.9% lock flush 10 milliLiter(s) IV Push every 1 hour PRN After each medication administration  sodium chloride 0.9% lock flush 10 milliLiter(s) IV Push every 12 hours PRN Lumen of catheter NOT used      Weight: 99.5kg/ 219lbs.  Per bed scale 10/5 BW is 181lbs.   Discussed w/ RN obtaining standing wt. Will continue to trend bed scale wts to more accurately assess meeting nutrient needs via TPN    Weight Change: No new weight since Aug 1st. Need to obtain new actual weight.    Estimated energy needs: remain unchanged from previous follow ups  Estimated energy needs using 52 kg IBW:  30-35 kcal/kg (9536-4003 kcal).   1.8-2 g/kg ( g protein).  30-35 mL/kg (3829-9993 mL fluid).     Subjective:   Pt lethargic and uncommunicative at time of assessment. Nutrition course remains the same. TPN via PICC line. Diet advanced to ice chips and protein bars only if OOBA. Noted recently taken triglycerides was 471 (10/6) and 226 (10/9). Consider providing 20g 20% lipids 3-4/wk vs. 50g.  .  Malecot in enterotomy site, wound vac to midline, DTI, and stage II pressure injury. Concern for new pressure ulcer per provider handoff. TPN providing 100% estimated needs at present. Pt to continue receiving 100% nutrition via TPN until reconstructive surgery is feasible.     Previous Nutrition Diagnosis:Increased nutrient needs RT fistula & s/p surgery AEB increased kcal/protein needs.     Active [  X]  Resolved [   ]    Goal: PT to meet >75% EER via tolerated route    Recommendations:   1. Please take actual weight for trending purposes  2. Continue with current TPN order  3. PO diet per MD discretion   4. Continue to check labs: triglycerides, BG & FS and adjust TPN per MD discretion  5 Check labs for signs of fat malabsorption, consider ADEK vitamin.     Education: Understands meeting nutrient needs via TPN.    Risk Level: High [ X  ] Moderate [   ] Low [   ].

## 2017-10-09 NOTE — PROGRESS NOTE BEHAVIORAL HEALTH - NSBHFUPINTERVALHXFT_PSY_A_CORE
Pt has remained calm and in behavioral control and maintains that she does not have any suicidal thoughts or depressive sxs.  She was sleeping/ resting on approach, but awakened and fully coherent albeit mildly lethargic.  She is able to easily explain circumstances as comment made in the context distress with extended hospitalization course adjustment, and is reasonably understanding of situation.  Empathic engagement offered around difficulty dealing with uncertainty and stressors of hospitalization, particularly being ordered as NPO.    She is continuing to meet with psychology intern under supervision, for support.  She did become focused on her "medication" due to her, specifying only on prompting allusion to opioid (Dilaudid) on which she's been maintained, with seeming guardedness.  However, she was polite and not preoccupied, just mentioning that she is 'due' for it, confirming it to be ordered regularly.  RN confirms this on approach per pt request in review, particularly with hardware/ dressing changes.

## 2017-10-09 NOTE — PROGRESS NOTE BEHAVIORAL HEALTH - SUMMARY
57F reported history of anxiety on Lexapro, no prior psych history, PMH gastric bypass surgery with multiple complications, currently admitted for extended period of time due to fistulas, reported SI last night.  Patient denies active or passive SI, reports suicidal statement was said in a moment of frustration but patient never had any intent of killing herself.  Reports protective factors of family and Muslim.  No indication to continue patient on constant observation.  Patient appears to have very low frustration tolerance and some maladaptive personality traits that are making it particularly difficult for her to cope with extended hospital stay.  Patient's expression of SI last night seems to have been in context of limit setting by primary team (for dilaudid, PO intake) as well as knowledge that patient's  wasn't going to be visiting today.  Patient also likely uses food as coping mechanism given obesity and much talking about food; being NPO particularly challenging for her.  Would continue psychiatric medications as currently written given patient denies active psychiatric symptoms.  Psychiatry will follow 2-3 times weekly for support.

## 2017-10-09 NOTE — PROGRESS NOTE ADULT - SUBJECTIVE AND OBJECTIVE BOX
INTERVAL HPI/OVERNIGHT EVENTS:    No events.  No complaints    CONSTITUTIONAL:  Negative fever or chills, feels well, good appetite  EYES:  Negative  blurry vision or double vision  CARDIOVASCULAR:  Negative for chest pain or palpitations  RESPIRATORY:  Negative for cough, wheezing, or SOB   GASTROINTESTINAL:  Negative for nausea, vomiting, diarrhea, constipation, or abdominal pain  GENITOURINARY:  Negative frequency, urgency or dysuria  NEUROLOGIC:  No headache, confusion, dizziness, lightheadedness      ANTIBIOTICS/RELEVANT:    MEDICATIONS  (STANDING):  calcitriol Injectable 1 MICROGram(s) IV Push <User Schedule>  collagenase Ointment 1 Application(s) Topical daily  cyanocobalamin Injectable 1000 MICROGram(s) SubCutaneous every 7 days  dextrose 5%. 1000 milliLiter(s) (50 mL/Hr) IV Continuous <Continuous>  dextrose 50% Injectable 25 Gram(s) IV Push once  dextrose 50% Injectable 12.5 Gram(s) IV Push once  diazepam    Tablet 5 milliGRAM(s) Oral at bedtime  diphenhydrAMINE   Injectable 50 milliGRAM(s) IV Push at bedtime  enoxaparin Injectable 30 milliGRAM(s) SubCutaneous two times a day  escitalopram 20 milliGRAM(s) Oral daily  heparin  flush 100 Units/mL Injectable 100 Unit(s) IV Push every other day  heparin  flush 100 Units/mL Injectable 100 Unit(s) IV Push every other day  HYDROmorphone  Injectable 0.25 milliGRAM(s) IV Push daily  insulin lispro (HumaLOG) corrective regimen sliding scale   SubCutaneous every 6 hours  losartan 50 milliGRAM(s) Oral daily  nystatin Powder 1 Application(s) Topical two times a day  pantoprazole  Injectable 40 milliGRAM(s) IV Push daily  Parenteral Nutrition - Adult 1 Each (64 mL/Hr) TPN Continuous <Continuous>  sodium chloride 0.9% lock flush 20 milliLiter(s) IV Push once  vancomycin  IVPB 1250 milliGRAM(s) IV Intermittent every 12 hours    MEDICATIONS  (PRN):  acetaminophen   Tablet. 650 milliGRAM(s) Oral every 6 hours PRN Moderate Pain (4 - 6)  sodium chloride 0.9% lock flush 10 milliLiter(s) IV Push every 1 hour PRN After each medication administration  sodium chloride 0.9% lock flush 10 milliLiter(s) IV Push every 12 hours PRN Lumen of catheter NOT used        Vital Signs Last 24 Hrs  T(C): 36.7 (09 Oct 2017 08:55), Max: 36.7 (09 Oct 2017 05:52)  T(F): 98 (09 Oct 2017 08:55), Max: 98.1 (09 Oct 2017 05:52)  HR: 77 (09 Oct 2017 08:55) (72 - 77)  BP: 142/77 (09 Oct 2017 08:55) (142/77 - 158/94)  BP(mean): --  RR: 18 (09 Oct 2017 08:55) (17 - 18)  SpO2: 94% (09 Oct 2017 08:55) (93% - 94%)    PHYSICAL EXAM:  Constitutional: obese female, non-toxic, no distress  Eyes:  no icterus  Ear/Nose/Throat: no oral lesion, no sinus tenderness on percussion	  Neck:no JVD, no lymphadenopathy, supple, left IJ  Respiratory:  clear  Cardiovascular: S1S2 RRR, no murmurs  Gastrointestinal:soft, + colostomy, multiple drains  Extremities:no edema  Vascular: DP Pulse:	right normal; left normal      LABS:                        9.1    4.9   )-----------( 150      ( 07 Oct 2017 11:21 )             28.1     10-09    136  |  99  |  18  ----------------------------<  137<H>  4.5   |  24  |  0.47<L>    Ca    9.3      09 Oct 2017 06:22  Phos  3.8     10-09  Mg     2.0     10-09            MICROBIOLOGY:  Culture - Catheter (10.04.17 @ 17:29)    -  Cefazolin: R 16    -  Ampicillin: S <=2    -  Daptomycin: S <=0.5    -  Trimethoprim/Sulfamethoxazole: S <=0.5/9.5    -  Tetra/Doxy: S <=4    -  RIF- Rifampin: S <=1    -  Penicillin: R >8    -  Oxacillin: R >2    -  Linezolid: S 4    -  Linezolid: S 2    -  Erythromycin: R >4    -  Clindamycin: R <=0.5    Specimen Source: Cath Tip cath tip    Culture Results:   Too numerous to count Methicillin resistant Staphylococcus aureus  Additional Susceptibility to follow.  Result called to and read back byDilcia Teixeira RN 10/06/2017 12:41:43  Too numerous to count Enterococcus faecalis    Organism Identification: Methicillin resistant Staphylococcus aureus  Enterococcus faecalis    Organism: Methicillin resistant Staphylococcus aureus    Organism: Enterococcus faecalis    Method Type: ALISHA    Method Type: ALISHA        RADIOLOGY & ADDITIONAL STUDIES:

## 2017-10-09 NOTE — ADVANCED PRACTICE NURSE CONSULT - REASON FOR CONSULT
WOC nurse follow up to re-assess sacral pressure ulcer (injury). Dr Roach scheduled to change wound manager over abdominal wound this evening.

## 2017-10-10 LAB
A-TOCOPHEROL VIT E SERPL-MCNC: 22.6 MG/L — HIGH (ref 5.7–19.9)
ANION GAP SERPL CALC-SCNC: 15 MMOL/L — SIGNIFICANT CHANGE UP (ref 5–17)
BETA+GAMMA TOCOPHEROL SERPL-MCNC: 2.9 MG/L — SIGNIFICANT CHANGE UP
BUN SERPL-MCNC: 26 MG/DL — HIGH (ref 7–23)
CALCIUM SERPL-MCNC: 9.3 MG/DL — SIGNIFICANT CHANGE UP (ref 8.4–10.5)
CHLORIDE SERPL-SCNC: 102 MMOL/L — SIGNIFICANT CHANGE UP (ref 96–108)
CO2 SERPL-SCNC: 22 MMOL/L — SIGNIFICANT CHANGE UP (ref 22–31)
CREAT SERPL-MCNC: 0.45 MG/DL — LOW (ref 0.5–1.3)
GLUCOSE BLDC GLUCOMTR-MCNC: 137 MG/DL — HIGH (ref 70–99)
GLUCOSE BLDC GLUCOMTR-MCNC: 145 MG/DL — HIGH (ref 70–99)
GLUCOSE SERPL-MCNC: 142 MG/DL — HIGH (ref 70–99)
GRAM STN FLD: SIGNIFICANT CHANGE UP
HCT VFR BLD CALC: 31.1 % — LOW (ref 34.5–45)
HGB BLD-MCNC: 9.8 G/DL — LOW (ref 11.5–15.5)
MAGNESIUM SERPL-MCNC: 2.2 MG/DL — SIGNIFICANT CHANGE UP (ref 1.6–2.6)
MCHC RBC-ENTMCNC: 29.3 PG — SIGNIFICANT CHANGE UP (ref 27–34)
MCHC RBC-ENTMCNC: 31.5 G/DL — LOW (ref 32–36)
MCV RBC AUTO: 92.8 FL — SIGNIFICANT CHANGE UP (ref 80–100)
PHOSPHATE SERPL-MCNC: 4.1 MG/DL — SIGNIFICANT CHANGE UP (ref 2.5–4.5)
PLATELET # BLD AUTO: 246 K/UL — SIGNIFICANT CHANGE UP (ref 150–400)
POTASSIUM SERPL-MCNC: 4.6 MMOL/L — SIGNIFICANT CHANGE UP (ref 3.5–5.3)
POTASSIUM SERPL-SCNC: 4.6 MMOL/L — SIGNIFICANT CHANGE UP (ref 3.5–5.3)
RBC # BLD: 3.35 M/UL — LOW (ref 3.8–5.2)
RBC # FLD: 18.6 % — HIGH (ref 10.3–16.9)
SODIUM SERPL-SCNC: 139 MMOL/L — SIGNIFICANT CHANGE UP (ref 135–145)
VANCOMYCIN TROUGH SERPL-MCNC: 14.2 UG/ML — SIGNIFICANT CHANGE UP (ref 10–20)
VANCOMYCIN TROUGH SERPL-MCNC: 14.8 UG/ML — SIGNIFICANT CHANGE UP (ref 10–20)
VIT A SERPL-MCNC: 26 UG/DL — SIGNIFICANT CHANGE UP (ref 20–65)
WBC # BLD: 6.5 K/UL — SIGNIFICANT CHANGE UP (ref 3.8–10.5)
WBC # FLD AUTO: 6.5 K/UL — SIGNIFICANT CHANGE UP (ref 3.8–10.5)

## 2017-10-10 PROCEDURE — 99232 SBSQ HOSP IP/OBS MODERATE 35: CPT

## 2017-10-10 RX ORDER — VANCOMYCIN HCL 1 G
1250 VIAL (EA) INTRAVENOUS ONCE
Qty: 0 | Refills: 0 | Status: COMPLETED | OUTPATIENT
Start: 2017-10-10 | End: 2017-10-10

## 2017-10-10 RX ORDER — VANCOMYCIN HCL 1 G
VIAL (EA) INTRAVENOUS
Qty: 0 | Refills: 0 | Status: DISCONTINUED | OUTPATIENT
Start: 2017-10-10 | End: 2017-10-12

## 2017-10-10 RX ORDER — VANCOMYCIN HCL 1 G
1250 VIAL (EA) INTRAVENOUS EVERY 12 HOURS
Qty: 0 | Refills: 0 | Status: DISCONTINUED | OUTPATIENT
Start: 2017-10-11 | End: 2017-10-12

## 2017-10-10 RX ORDER — OXYBUTYNIN CHLORIDE 5 MG
10 TABLET ORAL
Qty: 0 | Refills: 0 | Status: DISCONTINUED | OUTPATIENT
Start: 2017-10-10 | End: 2017-12-27

## 2017-10-10 RX ORDER — VANCOMYCIN HCL 1 G
1250 VIAL (EA) INTRAVENOUS EVERY 12 HOURS
Qty: 0 | Refills: 0 | Status: DISCONTINUED | OUTPATIENT
Start: 2017-10-10 | End: 2017-10-10

## 2017-10-10 RX ORDER — I.V. FAT EMULSION 20 G/100ML
50 EMULSION INTRAVENOUS ONCE
Qty: 0 | Refills: 0 | Status: DISCONTINUED | OUTPATIENT
Start: 2017-10-10 | End: 2017-10-10

## 2017-10-10 RX ORDER — I.V. FAT EMULSION 20 G/100ML
50 EMULSION INTRAVENOUS ONCE
Qty: 0 | Refills: 0 | Status: COMPLETED | OUTPATIENT
Start: 2017-10-10 | End: 2017-10-10

## 2017-10-10 RX ORDER — ELECTROLYTE SOLUTION,INJ
1 VIAL (ML) INTRAVENOUS
Qty: 0 | Refills: 0 | Status: DISCONTINUED | OUTPATIENT
Start: 2017-10-10 | End: 2017-10-10

## 2017-10-10 RX ADMIN — Medication 1 EACH: at 19:21

## 2017-10-10 RX ADMIN — Medication 650 MILLIGRAM(S): at 01:37

## 2017-10-10 RX ADMIN — Medication 10 MILLIGRAM(S): at 19:33

## 2017-10-10 RX ADMIN — Medication 50 MILLIGRAM(S): at 22:36

## 2017-10-10 RX ADMIN — I.V. FAT EMULSION 20.83 GRAM(S): 20 EMULSION INTRAVENOUS at 22:36

## 2017-10-10 RX ADMIN — ENOXAPARIN SODIUM 30 MILLIGRAM(S): 100 INJECTION SUBCUTANEOUS at 06:20

## 2017-10-10 RX ADMIN — ESCITALOPRAM OXALATE 20 MILLIGRAM(S): 10 TABLET, FILM COATED ORAL at 22:36

## 2017-10-10 RX ADMIN — NYSTATIN CREAM 1 APPLICATION(S): 100000 CREAM TOPICAL at 17:47

## 2017-10-10 RX ADMIN — Medication 650 MILLIGRAM(S): at 16:48

## 2017-10-10 RX ADMIN — Medication 650 MILLIGRAM(S): at 22:35

## 2017-10-10 RX ADMIN — HYDROMORPHONE HYDROCHLORIDE 0.25 MILLIGRAM(S): 2 INJECTION INTRAMUSCULAR; INTRAVENOUS; SUBCUTANEOUS at 13:04

## 2017-10-10 RX ADMIN — Medication 650 MILLIGRAM(S): at 07:40

## 2017-10-10 RX ADMIN — LOSARTAN POTASSIUM 50 MILLIGRAM(S): 100 TABLET, FILM COATED ORAL at 06:20

## 2017-10-10 RX ADMIN — Medication 5 MILLIGRAM(S): at 22:36

## 2017-10-10 RX ADMIN — ENOXAPARIN SODIUM 30 MILLIGRAM(S): 100 INJECTION SUBCUTANEOUS at 17:47

## 2017-10-10 RX ADMIN — Medication 650 MILLIGRAM(S): at 15:48

## 2017-10-10 RX ADMIN — Medication 650 MILLIGRAM(S): at 23:00

## 2017-10-10 RX ADMIN — HYDROMORPHONE HYDROCHLORIDE 0.25 MILLIGRAM(S): 2 INJECTION INTRAMUSCULAR; INTRAVENOUS; SUBCUTANEOUS at 12:49

## 2017-10-10 RX ADMIN — Medication 650 MILLIGRAM(S): at 06:40

## 2017-10-10 RX ADMIN — PANTOPRAZOLE SODIUM 40 MILLIGRAM(S): 20 TABLET, DELAYED RELEASE ORAL at 06:20

## 2017-10-10 RX ADMIN — Medication 166.67 MILLIGRAM(S): at 17:47

## 2017-10-10 RX ADMIN — Medication 650 MILLIGRAM(S): at 00:37

## 2017-10-10 NOTE — PROGRESS NOTE ADULT - ASSESSMENT
IMPRESSION:  MRSA bacteremia - resolved.  Suspect the vancomycin level was not a true trough as it was checked 7.5 hours after last dose.    Recommend:  1.  Restart vancomycin at 1250 mg IV q12.  Recheck trough 30 min prior to 4th dose (evening of 10/11)  2.  Will need 4 weeks IV antibiotic. Day 1 = 10/9/2017

## 2017-10-10 NOTE — PROGRESS NOTE ADULT - SUBJECTIVE AND OBJECTIVE BOX
OVERNIGHT EVENTS: no note from PT. vanc trough 28.1, vanc held. repeat trough at 10am, dressings changed.   10/9 : blood culture repeated, PT consulted.       SUBJECTIVE: Patient complains of pain requesting Dilaudid  Flatus: [] YES [X] NO             Bowel Movement: [ ] YES [X ] NO  Pain (0-10):            Pain Control Adequate: [X ] YES [ ] NO  Nausea: [ ] YES [X ] NO            Vomiting: [ ] YES [X ] NO  Diarrhea: [ ] YES [X ] NO         Constipation: [ ] YES [X ] NO     Chest Pain: [ ] YES [X ] NO    SOB:  [ ] YES [X ] NO    enoxaparin Injectable 30 milliGRAM(s) SubCutaneous two times a day  heparin  flush 100 Units/mL Injectable 100 Unit(s) IV Push every other day  heparin  flush 100 Units/mL Injectable 100 Unit(s) IV Push every other day  losartan 50 milliGRAM(s) Oral daily      Vital Signs Last 24 Hrs  T(C): 36.9 (09 Oct 2017 20:55), Max: 37.2 (09 Oct 2017 17:15)  T(F): 98.5 (09 Oct 2017 20:55), Max: 98.9 (09 Oct 2017 17:15)  HR: 76 (09 Oct 2017 20:55) (76 - 78)  BP: 149/79 (09 Oct 2017 20:55) (149/79 - 158/93)  BP(mean): --  RR: 16 (09 Oct 2017 20:55) (16 - 17)  SpO2: 94% (09 Oct 2017 20:55) (93% - 94%)  I&O's Detail    09 Oct 2017 07:01  -  10 Oct 2017 07:00  --------------------------------------------------------  IN:    TPN (Total Parenteral Nutrition): 1536 mL  Total IN: 1536 mL    OUT:    Drain: 200 mL    Voided: 1850 mL  Total OUT: 2050 mL    Total NET: -514 mL          General: NAD, resting comfortably in bed  C/V: NSR  Pulm: Nonlabored breathing, no respiratory distress  Abd: soft, NT/ND.  Extrem: WWP, no edema, SCDs in place  Fistula manager functioning     LABS:                        9.8    6.5   )-----------( 246      ( 10 Oct 2017 06:43 )             31.1     10-10    139  |  102  |  26<H>  ----------------------------<  142<H>  4.6   |  22  |  0.45<L>    Ca    9.3      10 Oct 2017 06:43  Phos  4.1     10-10  Mg     2.2     10-10            RADIOLOGY & ADDITIONAL STUDIES:

## 2017-10-10 NOTE — PROGRESS NOTE ADULT - SUBJECTIVE AND OBJECTIVE BOX
INTERVAL HPI/OVERNIGHT EVENTS:    Patient seen and examined.  No events.  No complaints.  Vanco held last night because of high level (not a true trough)    CONSTITUTIONAL:  Negative fever or chills, feels well, good appetite  EYES:  Negative  blurry vision or double vision  CARDIOVASCULAR:  Negative for chest pain or palpitations  RESPIRATORY:  Negative for cough, wheezing, or SOB   GASTROINTESTINAL:  Negative for nausea, vomiting, diarrhea, constipation, or abdominal pain  GENITOURINARY:  Negative frequency, urgency or dysuria  NEUROLOGIC:  No headache, confusion, dizziness, lightheadedness      ANTIBIOTICS/RELEVANT:    MEDICATIONS  (STANDING):  calcitriol Injectable 1 MICROGram(s) IV Push <User Schedule>  collagenase Ointment 1 Application(s) Topical daily  cyanocobalamin Injectable 1000 MICROGram(s) SubCutaneous every 7 days  dextrose 5%. 1000 milliLiter(s) (50 mL/Hr) IV Continuous <Continuous>  dextrose 50% Injectable 25 Gram(s) IV Push once  dextrose 50% Injectable 12.5 Gram(s) IV Push once  diazepam    Tablet 5 milliGRAM(s) Oral at bedtime  diphenhydrAMINE   Injectable 50 milliGRAM(s) IV Push at bedtime  enoxaparin Injectable 30 milliGRAM(s) SubCutaneous two times a day  escitalopram 20 milliGRAM(s) Oral daily  heparin  flush 100 Units/mL Injectable 100 Unit(s) IV Push every other day  heparin  flush 100 Units/mL Injectable 100 Unit(s) IV Push every other day  HYDROmorphone  Injectable 0.25 milliGRAM(s) IV Push daily  insulin lispro (HumaLOG) corrective regimen sliding scale   SubCutaneous every 6 hours  losartan 50 milliGRAM(s) Oral daily  nystatin Powder 1 Application(s) Topical two times a day  pantoprazole  Injectable 40 milliGRAM(s) IV Push daily  Parenteral Nutrition - Adult 1 Each (64 mL/Hr) TPN Continuous <Continuous>  sodium chloride 0.9% lock flush 20 milliLiter(s) IV Push once    MEDICATIONS  (PRN):  acetaminophen   Tablet. 650 milliGRAM(s) Oral every 6 hours PRN Moderate Pain (4 - 6)  sodium chloride 0.9% lock flush 10 milliLiter(s) IV Push every 1 hour PRN After each medication administration  sodium chloride 0.9% lock flush 10 milliLiter(s) IV Push every 12 hours PRN Lumen of catheter NOT used        Vital Signs Last 24 Hrs  T(C): 36.9 (09 Oct 2017 20:55), Max: 37.2 (09 Oct 2017 17:15)  T(F): 98.5 (09 Oct 2017 20:55), Max: 98.9 (09 Oct 2017 17:15)  HR: 76 (09 Oct 2017 20:55) (76 - 78)  BP: 149/79 (09 Oct 2017 20:55) (149/79 - 158/93)  BP(mean): --  RR: 16 (09 Oct 2017 20:55) (16 - 17)  SpO2: 94% (09 Oct 2017 20:55) (93% - 94%)    PHYSICAL EXAM:  Constitutional: non-toxic, no distress  Eyes: no icterus  Ear/Nose/Throat: no oral lesion  Neck:no JVD, no lymphadenopathy, supple  Respiratory: CTA eileen, left sided central line  Cardiovascular: S1S2 RRR, no murmurs  Gastrointestinal:soft, + colostomy  Extremities: + edema  Vascular: DP Pulse:	right normal; left normal      LABS:                        9.8    6.5   )-----------( 246      ( 10 Oct 2017 06:43 )             31.1     10-10    139  |  102  |  26<H>  ----------------------------<  142<H>  4.6   |  22  |  0.45<L>    Ca    9.3      10 Oct 2017 06:43  Phos  4.1     10-10  Mg     2.2     10-10        vanco level:  28.3    MICROBIOLOGY:  Culture - Blood (10.09.17 @ 13:45)    Specimen Source: .Blood R arm    Culture Results:   No growth at 12 hours      RADIOLOGY & ADDITIONAL STUDIES:

## 2017-10-10 NOTE — PROGRESS NOTE ADULT - SUBJECTIVE AND OBJECTIVE BOX
has large ostomy appliance and seems stable  of note her albumin and total protein have declined  this week after being stable for long time  afebrile since line change  continue present management and check numbers and continue tpn  do not know why would fall as outputs have not increased  will recheck and titrate tpn

## 2017-10-11 LAB
ALBUMIN SERPL ELPH-MCNC: 2.8 G/DL — LOW (ref 3.3–5)
ALP SERPL-CCNC: 628 U/L — HIGH (ref 40–120)
ALT FLD-CCNC: 104 U/L — HIGH (ref 10–45)
ANION GAP SERPL CALC-SCNC: 15 MMOL/L — SIGNIFICANT CHANGE UP (ref 5–17)
AST SERPL-CCNC: 44 U/L — HIGH (ref 10–40)
BILIRUB SERPL-MCNC: 1 MG/DL — SIGNIFICANT CHANGE UP (ref 0.2–1.2)
BUN SERPL-MCNC: 28 MG/DL — HIGH (ref 7–23)
CALCIUM SERPL-MCNC: 9.6 MG/DL — SIGNIFICANT CHANGE UP (ref 8.4–10.5)
CHLORIDE SERPL-SCNC: 102 MMOL/L — SIGNIFICANT CHANGE UP (ref 96–108)
CO2 SERPL-SCNC: 22 MMOL/L — SIGNIFICANT CHANGE UP (ref 22–31)
CREAT SERPL-MCNC: 0.51 MG/DL — SIGNIFICANT CHANGE UP (ref 0.5–1.3)
GLUCOSE BLDC GLUCOMTR-MCNC: 111 MG/DL — HIGH (ref 70–99)
GLUCOSE BLDC GLUCOMTR-MCNC: 136 MG/DL — HIGH (ref 70–99)
GLUCOSE BLDC GLUCOMTR-MCNC: 171 MG/DL — HIGH (ref 70–99)
GLUCOSE BLDC GLUCOMTR-MCNC: 89 MG/DL — SIGNIFICANT CHANGE UP (ref 70–99)
GLUCOSE SERPL-MCNC: 132 MG/DL — HIGH (ref 70–99)
HCT VFR BLD CALC: 32.6 % — LOW (ref 34.5–45)
HGB BLD-MCNC: 10.2 G/DL — LOW (ref 11.5–15.5)
MAGNESIUM SERPL-MCNC: 2.2 MG/DL — SIGNIFICANT CHANGE UP (ref 1.6–2.6)
MCHC RBC-ENTMCNC: 28.8 PG — SIGNIFICANT CHANGE UP (ref 27–34)
MCHC RBC-ENTMCNC: 31.3 G/DL — LOW (ref 32–36)
MCV RBC AUTO: 92.1 FL — SIGNIFICANT CHANGE UP (ref 80–100)
MENADIONE SERPL-MCNC: 7.43 NG/ML — HIGH (ref 0.13–1.88)
PHOSPHATE SERPL-MCNC: 3.5 MG/DL — SIGNIFICANT CHANGE UP (ref 2.5–4.5)
PLATELET # BLD AUTO: 293 K/UL — SIGNIFICANT CHANGE UP (ref 150–400)
POTASSIUM SERPL-MCNC: 4.8 MMOL/L — SIGNIFICANT CHANGE UP (ref 3.5–5.3)
POTASSIUM SERPL-SCNC: 4.8 MMOL/L — SIGNIFICANT CHANGE UP (ref 3.5–5.3)
PROT SERPL-MCNC: 7.4 G/DL — SIGNIFICANT CHANGE UP (ref 6–8.3)
RBC # BLD: 3.54 M/UL — LOW (ref 3.8–5.2)
RBC # FLD: 18.8 % — HIGH (ref 10.3–16.9)
SODIUM SERPL-SCNC: 139 MMOL/L — SIGNIFICANT CHANGE UP (ref 135–145)
VANCOMYCIN TROUGH SERPL-MCNC: 21.3 UG/ML — HIGH (ref 10–20)
WBC # BLD: 8.7 K/UL — SIGNIFICANT CHANGE UP (ref 3.8–10.5)
WBC # FLD AUTO: 8.7 K/UL — SIGNIFICANT CHANGE UP (ref 3.8–10.5)

## 2017-10-11 PROCEDURE — 99233 SBSQ HOSP IP/OBS HIGH 50: CPT

## 2017-10-11 PROCEDURE — 99233 SBSQ HOSP IP/OBS HIGH 50: CPT | Mod: GC

## 2017-10-11 RX ORDER — ELECTROLYTE SOLUTION,INJ
1 VIAL (ML) INTRAVENOUS
Qty: 0 | Refills: 0 | Status: DISCONTINUED | OUTPATIENT
Start: 2017-10-11 | End: 2017-10-11

## 2017-10-11 RX ORDER — DIAZEPAM 5 MG
5 TABLET ORAL AT BEDTIME
Qty: 0 | Refills: 0 | Status: DISCONTINUED | OUTPATIENT
Start: 2017-10-12 | End: 2017-10-19

## 2017-10-11 RX ORDER — ONDANSETRON 8 MG/1
4 TABLET, FILM COATED ORAL EVERY 6 HOURS
Qty: 0 | Refills: 0 | Status: DISCONTINUED | OUTPATIENT
Start: 2017-10-11 | End: 2017-12-27

## 2017-10-11 RX ORDER — I.V. FAT EMULSION 20 G/100ML
50 EMULSION INTRAVENOUS ONCE
Qty: 0 | Refills: 0 | Status: COMPLETED | OUTPATIENT
Start: 2017-10-11 | End: 2017-10-11

## 2017-10-11 RX ADMIN — Medication 650 MILLIGRAM(S): at 13:30

## 2017-10-11 RX ADMIN — Medication 650 MILLIGRAM(S): at 19:30

## 2017-10-11 RX ADMIN — ENOXAPARIN SODIUM 30 MILLIGRAM(S): 100 INJECTION SUBCUTANEOUS at 17:33

## 2017-10-11 RX ADMIN — Medication 166.67 MILLIGRAM(S): at 05:46

## 2017-10-11 RX ADMIN — ENOXAPARIN SODIUM 30 MILLIGRAM(S): 100 INJECTION SUBCUTANEOUS at 05:47

## 2017-10-11 RX ADMIN — PREGABALIN 1000 MICROGRAM(S): 225 CAPSULE ORAL at 12:32

## 2017-10-11 RX ADMIN — Medication 650 MILLIGRAM(S): at 14:30

## 2017-10-11 RX ADMIN — Medication 2: at 07:18

## 2017-10-11 RX ADMIN — Medication 50 MILLIGRAM(S): at 23:02

## 2017-10-11 RX ADMIN — Medication 10 MILLIGRAM(S): at 17:33

## 2017-10-11 RX ADMIN — ESCITALOPRAM OXALATE 20 MILLIGRAM(S): 10 TABLET, FILM COATED ORAL at 23:02

## 2017-10-11 RX ADMIN — LOSARTAN POTASSIUM 50 MILLIGRAM(S): 100 TABLET, FILM COATED ORAL at 05:46

## 2017-10-11 RX ADMIN — PANTOPRAZOLE SODIUM 40 MILLIGRAM(S): 20 TABLET, DELAYED RELEASE ORAL at 05:48

## 2017-10-11 RX ADMIN — CALCITRIOL 1 MICROGRAM(S): 0.5 CAPSULE ORAL at 12:32

## 2017-10-11 RX ADMIN — Medication 166.67 MILLIGRAM(S): at 20:48

## 2017-10-11 RX ADMIN — Medication 1 EACH: at 17:34

## 2017-10-11 RX ADMIN — HYDROMORPHONE HYDROCHLORIDE 0.25 MILLIGRAM(S): 2 INJECTION INTRAMUSCULAR; INTRAVENOUS; SUBCUTANEOUS at 12:29

## 2017-10-11 RX ADMIN — Medication 650 MILLIGRAM(S): at 06:45

## 2017-10-11 RX ADMIN — HYDROMORPHONE HYDROCHLORIDE 0.25 MILLIGRAM(S): 2 INJECTION INTRAMUSCULAR; INTRAVENOUS; SUBCUTANEOUS at 13:00

## 2017-10-11 RX ADMIN — I.V. FAT EMULSION 20.83 GRAM(S): 20 EMULSION INTRAVENOUS at 17:34

## 2017-10-11 RX ADMIN — Medication 5 MILLIGRAM(S): at 23:02

## 2017-10-11 RX ADMIN — Medication 650 MILLIGRAM(S): at 05:46

## 2017-10-11 RX ADMIN — NYSTATIN CREAM 1 APPLICATION(S): 100000 CREAM TOPICAL at 17:33

## 2017-10-11 RX ADMIN — Medication 10 MILLIGRAM(S): at 05:46

## 2017-10-11 RX ADMIN — ONDANSETRON 4 MILLIGRAM(S): 8 TABLET, FILM COATED ORAL at 23:02

## 2017-10-11 RX ADMIN — Medication 100 UNIT(S): at 12:32

## 2017-10-11 NOTE — PROGRESS NOTE BEHAVIORAL HEALTH - SUMMARY
57F reported history of anxiety on Lexapro, no prior psych history, PMH gastric bypass surgery with multiple complications, currently admitted for extended period of time due to fistulas, reported SI while frustrated on 10/5 evening, has since consistently denied.  Patient somewhat childish, does not accept any ownership over her medical condition.  Appears to have very low frustration tolerance and some maladaptive personality traits that are making it particularly difficult for her to cope with extended hospital stay.  Patient also likely uses food as coping mechanism given obesity and much talking about food; being NPO particularly challenging for her.  Would continue psychiatric medications as currently written given patient denies active psychiatric symptoms.  Psychiatry will follow 2-3 times weekly for support.

## 2017-10-11 NOTE — PROGRESS NOTE BEHAVIORAL HEALTH - NSBHFUPINTERVALHXFT_PSY_A_CORE
Patient seen at bedside with resident Dr. Sethi; had eyes closed, lights off when we entered.  Declined turning lights on.  Reports she is doing well, had visit from family members over the weekend.  Soon after start of interview requested we get her nurse; was vague initially about what she wanted from her nurse- eventually said it was to "get started" on pain medication because "they're changing my wound vac later."  Patient feels she is receiving appropriate amount of pain medication.  Discussed plans for medical care; patient says she is going to get surgical procedure to correct fistula "either next week or the week after."  Says she is getting out of bed with PT or staff every day.  Denies SI or any repeat of suicidal statement; says "that was fake because I was angry."

## 2017-10-11 NOTE — PROGRESS NOTE ADULT - SUBJECTIVE AND OBJECTIVE BOX
INTERVAL HPI/OVERNIGHT EVENTS:    No events.  Blood cultures still positive    CONSTITUTIONAL:  Negative fever or chills, feels well, good appetite  EYES:  Negative  blurry vision or double vision  CARDIOVASCULAR:  Negative for chest pain or palpitations  RESPIRATORY:  Negative for cough, wheezing, or SOB   GASTROINTESTINAL:  Negative for nausea, vomiting, diarrhea, constipation, or abdominal pain  GENITOURINARY:  Negative frequency, urgency or dysuria  NEUROLOGIC:  No headache, confusion, dizziness, lightheadedness      ANTIBIOTICS/RELEVANT:    MEDICATIONS  (STANDING):  calcitriol Injectable 1 MICROGram(s) IV Push <User Schedule>  collagenase Ointment 1 Application(s) Topical daily  cyanocobalamin Injectable 1000 MICROGram(s) SubCutaneous every 7 days  dextrose 5%. 1000 milliLiter(s) (50 mL/Hr) IV Continuous <Continuous>  dextrose 50% Injectable 25 Gram(s) IV Push once  dextrose 50% Injectable 12.5 Gram(s) IV Push once  diazepam    Tablet 5 milliGRAM(s) Oral at bedtime  diphenhydrAMINE   Injectable 50 milliGRAM(s) IV Push at bedtime  enoxaparin Injectable 30 milliGRAM(s) SubCutaneous two times a day  escitalopram 20 milliGRAM(s) Oral daily  fat emulsion 20% IVPB 50 Gram(s) IV Intermittent once  heparin  flush 100 Units/mL Injectable 100 Unit(s) IV Push every other day  heparin  flush 100 Units/mL Injectable 100 Unit(s) IV Push every other day  HYDROmorphone  Injectable 0.25 milliGRAM(s) IV Push daily  insulin lispro (HumaLOG) corrective regimen sliding scale   SubCutaneous every 6 hours  losartan 50 milliGRAM(s) Oral daily  nystatin Powder 1 Application(s) Topical two times a day  oxybutynin 10 milliGRAM(s) Oral two times a day  pantoprazole  Injectable 40 milliGRAM(s) IV Push daily  Parenteral Nutrition - Adult 1 Each (73 mL/Hr) TPN Continuous <Continuous>  Parenteral Nutrition - Adult 1 Each (64 mL/Hr) TPN Continuous <Continuous>  sodium chloride 0.9% lock flush 20 milliLiter(s) IV Push once  vancomycin  IVPB      vancomycin  IVPB 1250 milliGRAM(s) IV Intermittent every 12 hours    MEDICATIONS  (PRN):  acetaminophen   Tablet. 650 milliGRAM(s) Oral every 6 hours PRN Moderate Pain (4 - 6)  sodium chloride 0.9% lock flush 10 milliLiter(s) IV Push every 1 hour PRN After each medication administration  sodium chloride 0.9% lock flush 10 milliLiter(s) IV Push every 12 hours PRN Lumen of catheter NOT used        Vital Signs Last 24 Hrs  T(C): 36.9 (11 Oct 2017 10:44), Max: 37.9 (11 Oct 2017 05:47)  T(F): 98.4 (11 Oct 2017 10:44), Max: 100.3 (11 Oct 2017 05:47)  HR: 82 (11 Oct 2017 10:44) (62 - 85)  BP: 146/88 (11 Oct 2017 10:44) (143/86 - 166/80)  BP(mean): --  RR: 16 (11 Oct 2017 10:44) (15 - 17)  SpO2: 95% (11 Oct 2017 10:44) (94% - 96%)    PHYSICAL EXAM:  Constitutional:  non-toxic, chronically ill appearing  Eyes:  no icterus  Ear/Nose/Throat: no oral lesion  Neck:  supple  Respiratory: CTA eileen  Cardiovascular: S1S2 RRR, no murmurs  Gastrointestinal:soft, (+) BS, no HSM; large abdominal wound  Extremities:no edema  Vascular: DP Pulse:	right normal; left normal      LABS:                        10.2   8.7   )-----------( 293      ( 11 Oct 2017 06:44 )             32.6     10-11    139  |  102  |  28<H>  ----------------------------<  132<H>  4.8   |  22  |  0.51    Ca    9.6      11 Oct 2017 06:44  Phos  3.5     10-11  Mg     2.2     10-11    TPro  7.4  /  Alb  2.8<L>  /  TBili  1.0  /  DBili  x   /  AST  44<H>  /  ALT  104<H>  /  AlkPhos  628<H>  10-11          MICROBIOLOGY:  Culture - Blood (10.09.17 @ 13:45)    Gram Stain:   Aerobic Bottle: Gram Positive Cocci in Clusters  Result called to and read back by_ Ms. ELÍAS Monte RN  10/10/2017  10:48:59    Specimen Source: .Blood R arm    Culture Results:   Culture in progress        RADIOLOGY & ADDITIONAL STUDIES:

## 2017-10-11 NOTE — PROGRESS NOTE ADULT - SUBJECTIVE AND OBJECTIVE BOX
essentially unchanged  continue to wait for reconstruction to hopefully develop pre peritoneal plane circumfrentially  bun up cr down this can be from increasing nutrtion as albumin fell and total protein so increased  will need to follow and make sure it is not from increased volume losses

## 2017-10-11 NOTE — PROGRESS NOTE ADULT - SUBJECTIVE AND OBJECTIVE BOX
O/N: BRAYDEN  10/10: per ID  vanco trough is inaccurate restart vanco at 1250 q 12 hours, VANC trough 12.8 OVERNIGHT EVENTS:  BRAYDEN  10/10: per ID  vanco trough is inaccurate restart vanco at 1250 q 12 hours, VANC trough 14.8    STATUS POST:      7/24: SBR resection, fistula resection, revision of esophagogegunostomy drain through esophageal hiatus in R mediastinum  7/29: IR drainage of 800 cc of succus  7/29: Ex-lap, SB anastamosis in BP limb breakdown with succus throughout abdomen  8/1: abd washout, drainage of abscess, biologic mesh placement, closure of abdominal wall, vac and retention suture  8/7: abd washout, mesh removal, frozen abd noted, LLQ CHECO replaced with benson drain  8/10: leak noted from previous anastamosis site on L side, mid abdomen malecot drain placed in enterotomy, JPs x 2 placed, necrotic fascia debrided, vicryl mesh sutured to fascia circumferentially, xeroform over mesh then vac dressing placed	    SUBJECTIVE: Patient  examined bedside. Patient reports that she wants Dilaudid  more frequently and wants to start a more substantial diet   Flatus: [X] YES [] NO             Bowel Movement: [X ] YES [ ] NO  Pain (0-10):            Pain Control Adequate: [X ] YES [ ] NO  Nausea: [ ] YES [ X] NO            Vomiting: [ ] YES [X ] NO  Diarrhea: [ ] YES [X ] NO         Constipation: [ ] YES [ X] NO     Chest Pain: [ ] YES [X ] NO    SOB:  [ ] YES [X ] NO    enoxaparin Injectable 30 milliGRAM(s) SubCutaneous two times a day  heparin  flush 100 Units/mL Injectable 100 Unit(s) IV Push every other day  heparin  flush 100 Units/mL Injectable 100 Unit(s) IV Push every other day  losartan 50 milliGRAM(s) Oral daily  vancomycin  IVPB      vancomycin  IVPB 1250 milliGRAM(s) IV Intermittent every 12 hours      Vital Signs Last 24 Hrs  T(C): 37.9 (11 Oct 2017 05:47), Max: 37.9 (11 Oct 2017 05:47)  T(F): 100.3 (11 Oct 2017 05:47), Max: 100.3 (11 Oct 2017 05:47)  HR: 85 (11 Oct 2017 05:47) (62 - 85)  BP: 157/96 (11 Oct 2017 05:47) (143/86 - 166/80)  BP(mean): --  RR: 17 (11 Oct 2017 05:47) (15 - 17)  SpO2: 94% (11 Oct 2017 05:47) (94% - 96%)  I&O's Detail    10 Oct 2017 07:01  -  11 Oct 2017 07:00  --------------------------------------------------------  IN:    TPN (Total Parenteral Nutrition): 768 mL  Total IN: 768 mL    OUT:    Drain: 270 mL    Voided: 1950 mL  Total OUT: 2220 mL    Total NET: -1452 mL          General: NAD, resting comfortably in bed  C/V: NSR  Pulm: Nonlabored breathing, no respiratory distress  Abd: soft, NT/ND. Fistula manager functioning   Extrem: WWP, no edema, SCDs in place        LABS:                        10.2   8.7   )-----------( 293      ( 11 Oct 2017 06:44 )             32.6     10-11    139  |  102  |  28<H>  ----------------------------<  132<H>  4.8   |  22  |  0.51    Ca    9.6      11 Oct 2017 06:44  Phos  3.5     10-11  Mg     2.2     10-11    TPro  7.4  /  Alb  2.8<L>  /  TBili  1.0  /  DBili  x   /  AST  44<H>  /  ALT  104<H>  /  AlkPhos  628<H>  10-11

## 2017-10-11 NOTE — PROGRESS NOTE ADULT - ASSESSMENT
57 F s/p  SBR, fistula resection, esophagojejunostomy revision w/ post-op course complicated by RTOR for distal anastomosis breakdown, ATN, acute respiratory failure, & septic shock, since resolved.  NPO/TPN/IVF  Pain/nausea control - only benadryl at midnight and dilaudid during VAC change  ISS  L IJ  Nystatin powder  SCDs, lovenox, OOBA  Drains: Malecot in enterotomy site, wound vac to midline  Wounds: Midline xiphoid to pubis incision; DTI & stage 2 pressure ulcer noted. 57 F s/p  SBR, fistula resection, esophagojejunostomy revision w/ post-op course complicated by RTOR for distal anastomosis breakdown, ATN, acute respiratory failure, & septic shock, since resolved.  NPO/TPN/IVF  Pain/nausea control - only benadryl at midnight and dilaudid during VAC change  ISS  L IJ  Nystatin powder  SCDs, lovenox, OOBA  Drains: Malecot in enterotomy site, wound vac to midline  Wounds: Midline xiphoid to pubis incision; DTI & stage 2 pressure ulcer noted.    Physical therapy

## 2017-10-11 NOTE — PROGRESS NOTE ADULT - ASSESSMENT
IMPRESSION:  MRSA bacteremia. Blood culture still positive    Recommend:    1.  Continue vancomycin at current dose  2. Check blood cultures daily until cleared  3.  Check TTE  4.  If blood cultures do not clear, will need central line removed

## 2017-10-12 LAB
ANION GAP SERPL CALC-SCNC: 19 MMOL/L — HIGH (ref 5–17)
BUN SERPL-MCNC: 32 MG/DL — HIGH (ref 7–23)
CALCIUM SERPL-MCNC: 9.2 MG/DL — SIGNIFICANT CHANGE UP (ref 8.4–10.5)
CHLORIDE SERPL-SCNC: 96 MMOL/L — SIGNIFICANT CHANGE UP (ref 96–108)
CO2 SERPL-SCNC: 20 MMOL/L — LOW (ref 22–31)
CREAT SERPL-MCNC: 0.62 MG/DL — SIGNIFICANT CHANGE UP (ref 0.5–1.3)
GLUCOSE BLDC GLUCOMTR-MCNC: 123 MG/DL — HIGH (ref 70–99)
GLUCOSE BLDC GLUCOMTR-MCNC: 125 MG/DL — HIGH (ref 70–99)
GLUCOSE BLDC GLUCOMTR-MCNC: 154 MG/DL — HIGH (ref 70–99)
GLUCOSE SERPL-MCNC: 158 MG/DL — HIGH (ref 70–99)
GRAM STN FLD: SIGNIFICANT CHANGE UP
GRAM STN FLD: SIGNIFICANT CHANGE UP
HCT VFR BLD CALC: 31.5 % — LOW (ref 34.5–45)
HGB BLD-MCNC: 9.9 G/DL — LOW (ref 11.5–15.5)
MAGNESIUM SERPL-MCNC: 1.9 MG/DL — SIGNIFICANT CHANGE UP (ref 1.6–2.6)
MCHC RBC-ENTMCNC: 29.1 PG — SIGNIFICANT CHANGE UP (ref 27–34)
MCHC RBC-ENTMCNC: 31.4 G/DL — LOW (ref 32–36)
MCV RBC AUTO: 92.6 FL — SIGNIFICANT CHANGE UP (ref 80–100)
PHOSPHATE SERPL-MCNC: 3.6 MG/DL — SIGNIFICANT CHANGE UP (ref 2.5–4.5)
PLATELET # BLD AUTO: 248 K/UL — SIGNIFICANT CHANGE UP (ref 150–400)
POTASSIUM SERPL-MCNC: 4.2 MMOL/L — SIGNIFICANT CHANGE UP (ref 3.5–5.3)
POTASSIUM SERPL-SCNC: 4.2 MMOL/L — SIGNIFICANT CHANGE UP (ref 3.5–5.3)
RBC # BLD: 3.4 M/UL — LOW (ref 3.8–5.2)
RBC # FLD: 18.5 % — HIGH (ref 10.3–16.9)
SODIUM SERPL-SCNC: 135 MMOL/L — SIGNIFICANT CHANGE UP (ref 135–145)
VANCOMYCIN TROUGH SERPL-MCNC: 14.5 UG/ML — SIGNIFICANT CHANGE UP (ref 10–20)
WBC # BLD: 12.4 K/UL — HIGH (ref 3.8–10.5)
WBC # FLD AUTO: 12.4 K/UL — HIGH (ref 3.8–10.5)

## 2017-10-12 PROCEDURE — 99233 SBSQ HOSP IP/OBS HIGH 50: CPT

## 2017-10-12 PROCEDURE — 93306 TTE W/DOPPLER COMPLETE: CPT | Mod: 26

## 2017-10-12 RX ORDER — VANCOMYCIN HCL 1 G
VIAL (EA) INTRAVENOUS
Qty: 0 | Refills: 0 | Status: DISCONTINUED | OUTPATIENT
Start: 2017-10-12 | End: 2017-10-12

## 2017-10-12 RX ORDER — VANCOMYCIN HCL 1 G
1250 VIAL (EA) INTRAVENOUS EVERY 24 HOURS
Qty: 0 | Refills: 0 | Status: DISCONTINUED | OUTPATIENT
Start: 2017-10-13 | End: 2017-10-13

## 2017-10-12 RX ORDER — VANCOMYCIN HCL 1 G
1250 VIAL (EA) INTRAVENOUS ONCE
Qty: 0 | Refills: 0 | Status: DISCONTINUED | OUTPATIENT
Start: 2017-10-12 | End: 2017-10-12

## 2017-10-12 RX ORDER — ELECTROLYTE SOLUTION,INJ
1 VIAL (ML) INTRAVENOUS
Qty: 0 | Refills: 0 | Status: DISCONTINUED | OUTPATIENT
Start: 2017-10-12 | End: 2017-10-12

## 2017-10-12 RX ORDER — I.V. FAT EMULSION 20 G/100ML
50 EMULSION INTRAVENOUS
Qty: 0 | Refills: 0 | Status: DISCONTINUED | OUTPATIENT
Start: 2017-10-12 | End: 2017-10-12

## 2017-10-12 RX ORDER — VANCOMYCIN HCL 1 G
1250 VIAL (EA) INTRAVENOUS ONCE
Qty: 0 | Refills: 0 | Status: COMPLETED | OUTPATIENT
Start: 2017-10-12 | End: 2017-10-12

## 2017-10-12 RX ORDER — VANCOMYCIN HCL 1 G
1000 VIAL (EA) INTRAVENOUS EVERY 12 HOURS
Qty: 0 | Refills: 0 | Status: DISCONTINUED | OUTPATIENT
Start: 2017-10-12 | End: 2017-10-12

## 2017-10-12 RX ORDER — HYDROMORPHONE HYDROCHLORIDE 2 MG/ML
0.25 INJECTION INTRAMUSCULAR; INTRAVENOUS; SUBCUTANEOUS DAILY
Qty: 0 | Refills: 0 | Status: DISCONTINUED | OUTPATIENT
Start: 2017-10-13 | End: 2017-10-15

## 2017-10-12 RX ADMIN — Medication 650 MILLIGRAM(S): at 23:23

## 2017-10-12 RX ADMIN — LOSARTAN POTASSIUM 50 MILLIGRAM(S): 100 TABLET, FILM COATED ORAL at 05:36

## 2017-10-12 RX ADMIN — ENOXAPARIN SODIUM 30 MILLIGRAM(S): 100 INJECTION SUBCUTANEOUS at 05:35

## 2017-10-12 RX ADMIN — Medication 10 MILLIGRAM(S): at 18:04

## 2017-10-12 RX ADMIN — Medication 650 MILLIGRAM(S): at 03:09

## 2017-10-12 RX ADMIN — NYSTATIN CREAM 1 APPLICATION(S): 100000 CREAM TOPICAL at 18:02

## 2017-10-12 RX ADMIN — Medication 650 MILLIGRAM(S): at 02:38

## 2017-10-12 RX ADMIN — PANTOPRAZOLE SODIUM 40 MILLIGRAM(S): 20 TABLET, DELAYED RELEASE ORAL at 05:36

## 2017-10-12 RX ADMIN — Medication 1 APPLICATION(S): at 05:36

## 2017-10-12 RX ADMIN — I.V. FAT EMULSION 20.83 GRAM(S): 20 EMULSION INTRAVENOUS at 23:17

## 2017-10-12 RX ADMIN — Medication 50 MILLIGRAM(S): at 22:46

## 2017-10-12 RX ADMIN — HYDROMORPHONE HYDROCHLORIDE 0.25 MILLIGRAM(S): 2 INJECTION INTRAMUSCULAR; INTRAVENOUS; SUBCUTANEOUS at 11:22

## 2017-10-12 RX ADMIN — Medication 5 MILLIGRAM(S): at 22:46

## 2017-10-12 RX ADMIN — HYDROMORPHONE HYDROCHLORIDE 0.25 MILLIGRAM(S): 2 INJECTION INTRAMUSCULAR; INTRAVENOUS; SUBCUTANEOUS at 11:57

## 2017-10-12 RX ADMIN — Medication 650 MILLIGRAM(S): at 17:08

## 2017-10-12 RX ADMIN — Medication 166.67 MILLIGRAM(S): at 18:20

## 2017-10-12 RX ADMIN — ESCITALOPRAM OXALATE 20 MILLIGRAM(S): 10 TABLET, FILM COATED ORAL at 22:46

## 2017-10-12 RX ADMIN — Medication 10 MILLIGRAM(S): at 05:36

## 2017-10-12 RX ADMIN — ENOXAPARIN SODIUM 30 MILLIGRAM(S): 100 INJECTION SUBCUTANEOUS at 18:31

## 2017-10-12 RX ADMIN — Medication 650 MILLIGRAM(S): at 16:51

## 2017-10-12 RX ADMIN — NYSTATIN CREAM 1 APPLICATION(S): 100000 CREAM TOPICAL at 05:36

## 2017-10-12 NOTE — PROGRESS NOTE ADULT - SUBJECTIVE AND OBJECTIVE BOX
O/N: BRAYDEN, VSS  10/11: Fistula manager changed, per Dr. Brady patient can start protein bars, Vanco trough at 5, TPN Kcal 2100 when lipids added. Had emesis episodes. NPO with sips and chips. Spiked temp to 100.9.    STATUS POST:  7/24: SBR resection, fistula resection, revision of esophagogegunostomy drain through esophageal hiatus in R mediastinum  7/29: IR drainage of 800 cc of succus  7/29: Ex-lap, SB anastamosis in BP limb breakdown with succus throughout abdomen  8/1: abd washout, drainage of abscess, biologic mesh placement, closure of abdominal wall, vac and retention suture  8/7: abd washout, mesh removal, frozen abd noted, LLQ CHECO replaced with benson drain  8/10: leak noted from previous anastamosis site on L side, mid abdomen malecot drain placed in enterotomy, JPs x 2 placed, necrotic fascia debrided, vicryl mesh sutured to fascia circumferentially, xeroform over mesh then vac dressing placed O/N: BRAYDEN, VSS  10/11: Fistula manager changed, per Dr. Brady patient can start protein bars, Vanco trough at 5, TPN Kcal 2100 when lipids added. Had emesis episodes. NPO with sips and chips. Spiked temp to 100.9.    STATUS POST:  7/24: SBR resection, fistula resection, revision of esophagogegunostomy drain through esophageal hiatus in R mediastinum  7/29: IR drainage of 800 cc of succus  7/29: Ex-lap, SB anastamosis in BP limb breakdown with succus throughout abdomen  8/1: abd washout, drainage of abscess, biologic mesh placement, closure of abdominal wall, vac and retention suture  8/7: abd washout, mesh removal, frozen abd noted, LLQ CHECO replaced with benson drain  8/10: leak noted from previous anastamosis site on L side, mid abdomen malecot drain placed in enterotomy, JPs x 2 placed, necrotic fascia debrided, vicryl mesh sutured to fascia circumferentially, xeroform over mesh then vac dressing placed	     SUBJECTIVE: Patient seen and examined bedside by chief resident. Had fever overnight, nauseous and vomited. NPO with sips and chips only. No pain.    enoxaparin Injectable 30 milliGRAM(s) SubCutaneous two times a day  heparin  flush 100 Units/mL Injectable 100 Unit(s) IV Push every other day  heparin  flush 100 Units/mL Injectable 100 Unit(s) IV Push every other day  losartan 50 milliGRAM(s) Oral daily  vancomycin  IVPB      vancomycin  IVPB 1250 milliGRAM(s) IV Intermittent every 12 hours      Vital Signs Last 24 Hrs  T(C): 38.5 (12 Oct 2017 06:03), Max: 38.5 (12 Oct 2017 06:03)  T(F): 101.3 (12 Oct 2017 06:03), Max: 101.3 (12 Oct 2017 06:03)  HR: 109 (12 Oct 2017 06:03) (82 - 109)  BP: 128/80 (12 Oct 2017 06:03) (128/80 - 175/104)  BP(mean): --  RR: 17 (12 Oct 2017 06:03) (16 - 17)  SpO2: 94% (12 Oct 2017 06:03) (94% - 96%)  I&O's Detail    11 Oct 2017 07:01  -  12 Oct 2017 07:00  --------------------------------------------------------  IN:    Fat Emulsion 20%: 249.6 mL    TPN (Total Parenteral Nutrition): 1593 mL  Total IN: 1842.6 mL    OUT:    Drain: 200 mL    Voided: 1150 mL  Total OUT: 1350 mL    Total NET: 492.6 mL          General: NAD, resting comfortably in bed  C/V: NSR  Pulm: Nonlabored breathing, no respiratory distress  Abd: soft, non tender and non distended, fistula manager is intact, around 50cc output. no leak. fistula wound looks clean.   Extrem: WWP, no edema, SCDs in place        LABS:                        10.2   8.7   )-----------( 293      ( 11 Oct 2017 06:44 )             32.6     10-11    139  |  102  |  28<H>  ----------------------------<  132<H>  4.8   |  22  |  0.51    Ca    9.6      11 Oct 2017 06:44  Phos  3.5     10-11  Mg     2.2     10-11    TPro  7.4  /  Alb  2.8<L>  /  TBili  1.0  /  DBili  x   /  AST  44<H>  /  ALT  104<H>  /  AlkPhos  628<H>  10-11          RADIOLOGY & ADDITIONAL STUDIES:

## 2017-10-12 NOTE — PROGRESS NOTE ADULT - SUBJECTIVE AND OBJECTIVE BOX
INTERVAL HPI/OVERNIGHT EVENTS:    Patient seen and examined.  Febrile overnight.  Requesting dilaudid    CONSTITUTIONAL:  Negative fever or chills, feels well, good appetite  EYES:  Negative  blurry vision or double vision  CARDIOVASCULAR:  Negative for chest pain or palpitations  RESPIRATORY:  Negative for cough, wheezing, or SOB   GASTROINTESTINAL:  Negative for nausea, vomiting, diarrhea, constipation, or abdominal pain  GENITOURINARY:  Negative frequency, urgency or dysuria  NEUROLOGIC:  No headache, confusion, dizziness, lightheadedness      ANTIBIOTICS/RELEVANT:    MEDICATIONS  (STANDING):  calcitriol Injectable 1 MICROGram(s) IV Push <User Schedule>  collagenase Ointment 1 Application(s) Topical daily  cyanocobalamin Injectable 1000 MICROGram(s) SubCutaneous every 7 days  dextrose 5%. 1000 milliLiter(s) (50 mL/Hr) IV Continuous <Continuous>  dextrose 50% Injectable 25 Gram(s) IV Push once  dextrose 50% Injectable 12.5 Gram(s) IV Push once  diazepam    Tablet 5 milliGRAM(s) Oral at bedtime  diphenhydrAMINE   Injectable 50 milliGRAM(s) IV Push at bedtime  enoxaparin Injectable 30 milliGRAM(s) SubCutaneous two times a day  escitalopram 20 milliGRAM(s) Oral daily  heparin  flush 100 Units/mL Injectable 100 Unit(s) IV Push every other day  HYDROmorphone  Injectable 0.25 milliGRAM(s) IV Push daily  insulin lispro (HumaLOG) corrective regimen sliding scale   SubCutaneous every 6 hours  losartan 50 milliGRAM(s) Oral daily  nystatin Powder 1 Application(s) Topical two times a day  oxybutynin 10 milliGRAM(s) Oral two times a day  pantoprazole  Injectable 40 milliGRAM(s) IV Push daily  Parenteral Nutrition - Adult 1 Each (73 mL/Hr) TPN Continuous <Continuous>  sodium chloride 0.9% lock flush 20 milliLiter(s) IV Push once  vancomycin  IVPB 1000 milliGRAM(s) IV Intermittent every 12 hours    MEDICATIONS  (PRN):  acetaminophen   Tablet. 650 milliGRAM(s) Oral every 6 hours PRN Moderate Pain (4 - 6)  ondansetron Injectable 4 milliGRAM(s) IV Push every 6 hours PRN Nausea and/or Vomiting  sodium chloride 0.9% lock flush 10 milliLiter(s) IV Push every 1 hour PRN After each medication administration  sodium chloride 0.9% lock flush 10 milliLiter(s) IV Push every 12 hours PRN Lumen of catheter NOT used        Vital Signs Last 24 Hrs  T(C): 37.6 (12 Oct 2017 08:47), Max: 38.5 (12 Oct 2017 06:03)  T(F): 99.7 (12 Oct 2017 08:47), Max: 101.3 (12 Oct 2017 06:03)  HR: 97 (12 Oct 2017 08:47) (83 - 109)  BP: 107/87 (12 Oct 2017 08:47) (107/87 - 175/104)  BP(mean): --  RR: 17 (12 Oct 2017 08:47) (16 - 17)  SpO2: 97% (12 Oct 2017 08:47) (94% - 97%)    PHYSICAL EXAM:  Constitutional:  non-toxic, no distress  Eyes:  no icterus  Ear/Nose/Throat: no oral lesion, no sinus tenderness on percussion	  Neck:no JVD, no lymphadenopathy, supple; left IJ  Respiratory: clear  Cardiovascular: S1S2 RRR, no murmurs  Gastrointestinal:soft, (+) BS, no HSM  Extremities:no edema  Vascular: DP Pulse:	right normal; left normal      LABS:                        9.9    12.4  )-----------( 248      ( 12 Oct 2017 06:41 )             31.5     10-12    135  |  96  |  32<H>  ----------------------------<  158<H>  4.2   |  20<L>  |  0.62    Ca    9.2      12 Oct 2017 06:41  Phos  3.6     10-12  Mg     1.9     10-12    TPro  7.4  /  Alb  2.8<L>  /  TBili  1.0  /  DBili  x   /  AST  44<H>  /  ALT  104<H>  /  AlkPhos  628<H>  10-11          MICROBIOLOGY:  Culture - Blood (10.11.17 @ 17:08)    Gram Stain:   Aerobic Bottle: Gram Positive Cocci in Clusters  Result called to and read back by_ Ms. SIMONE Hoang RN  10/12/2017 09:40:27    Specimen Source: .Blood Blood    Culture Results:   Culture in progress    Culture - Blood (10.11.17 @ 17:08)    Gram Stain:   Aerobic Bottle: Gram Positive Cocci in Clusters  Result called to and read back by_ Ms. SIMONE Hoang RN  10/12/2017 09:40:27    Specimen Source: .Blood Blood    Culture Results:   Culture in progress        RADIOLOGY & ADDITIONAL STUDIES:

## 2017-10-12 NOTE — CHART NOTE - NSCHARTNOTEFT_GEN_A_CORE
Admitting Diagnosis:   Patient is a 57y old  Female who presents with a chief complaint of enterocutaneous fistula (24 Jul 2017 14:15)      PAST MEDICAL & SURGICAL HISTORY:  Reflux  Obesity  DVT (deep venous thrombosis): 2013 neck  Diverticulitis  Hypertension  Elective surgery: abodominal wall surgery  dec 2016  Elective surgery: NOV 2016 gastric bypass revision  Gastric bypass status for obesity: gastric sleeve, 6/2012  S/P breast biopsy: 2011, left  S/P colon resection: 2011  S/P knee surgery: repair 2009, 2011  right; left  Umbilical hernia: 2000  S/P appendectomy: 1975  S/P tonsillectomy: 1967      Current Nutrition Order:   Diet, NPO:   Except Medications  With Ice Chips/Sips of Water (10-04-17 @ 18:16)   TPN via PICC line:  310g D,  150g AA, 50g 20% lipids. Providing pt with 2154 kcal, 2.9gkg IBW protein, 1750 mL fluid. GIR = 2.16.  **See recs below for appropriate TPN order    PO Intake: Good (%) [   ]  Fair (50-75%) [   ] Poor (<25%) [   ] N/A at the present. However per MD note patient can have specific protein.    GI Issues:  Pt had nauseous last night and vomited.    Pain: Being managed     Skin Integrity:   Unstageable pressure ulcer on sacrum, new central venous catheter 10/4.   wound vac to midline abdomen, malecot in entertotomy site,  Midline xiphoid to pubis incision; DTI & stage 2 pressure ulcer noted per MD note.   fistula manager is intact, around 50cc output. no leak. fistula wound looks clean.   Now w/ MRSA bacteremia secondary to infected central line.    Labs:   10-12    135  |  96  |  32<H>  ----------------------------<  158<H>  4.2   |  20<L>  |  0.62    Ca    9.2      12 Oct 2017 06:41  Phos  3.6     10-12  Mg     1.9     10-12    TPro  7.4  /  Alb  2.8<L>  /  TBili  1.0  /  DBili  x   /  AST  44<H>  /  ALT  104<H>  /  AlkPhos  628<H>  10-11    CAPILLARY BLOOD GLUCOSE  91 (12 Oct 2017 16:45)  154 (12 Oct 2017 07:01)  123 (12 Oct 2017 00:39)      POCT Blood Glucose.: 125 mg/dL (12 Oct 2017 11:06)  POCT Blood Glucose.: 154 mg/dL (12 Oct 2017 06:58)  POCT Blood Glucose.: 123 mg/dL (12 Oct 2017 00:36)      Medications:  MEDICATIONS  (STANDING):  calcitriol Injectable 1 MICROGram(s) IV Push <User Schedule>  collagenase Ointment 1 Application(s) Topical daily  cyanocobalamin Injectable 1000 MICROGram(s) SubCutaneous every 7 days  dextrose 5%. 1000 milliLiter(s) (50 mL/Hr) IV Continuous <Continuous>  dextrose 50% Injectable 25 Gram(s) IV Push once  dextrose 50% Injectable 12.5 Gram(s) IV Push once  diazepam    Tablet 5 milliGRAM(s) Oral at bedtime  diphenhydrAMINE   Injectable 50 milliGRAM(s) IV Push at bedtime  enoxaparin Injectable 30 milliGRAM(s) SubCutaneous two times a day  escitalopram 20 milliGRAM(s) Oral daily  fat emulsion 20% Infusion 50 Gram(s) (20.83 mL/Hr) IV Continuous <Continuous>  heparin  flush 100 Units/mL Injectable 100 Unit(s) IV Push every other day  insulin lispro (HumaLOG) corrective regimen sliding scale   SubCutaneous every 6 hours  losartan 50 milliGRAM(s) Oral daily  nystatin Powder 1 Application(s) Topical two times a day  oxybutynin 10 milliGRAM(s) Oral two times a day  pantoprazole  Injectable 40 milliGRAM(s) IV Push daily  Parenteral Nutrition - Adult 1 Each (73 mL/Hr) TPN Continuous <Continuous>  Parenteral Nutrition - Adult 1 Each (73 mL/Hr) TPN Continuous <Continuous>  sodium chloride 0.9% lock flush 20 milliLiter(s) IV Push once    MEDICATIONS  (PRN):  acetaminophen   Tablet. 650 milliGRAM(s) Oral every 6 hours PRN Moderate Pain (4 - 6)  ondansetron Injectable 4 milliGRAM(s) IV Push every 6 hours PRN Nausea and/or Vomiting  sodium chloride 0.9% lock flush 10 milliLiter(s) IV Push every 1 hour PRN After each medication administration  sodium chloride 0.9% lock flush 10 milliLiter(s) IV Push every 12 hours PRN Lumen of catheter NOT used    Weight: 99.5kg/ 219lbs.  Per bed scale 10/5 BW is 181lbs.   Discussed w/ RN obtaining standing wt. Will continue to trend bed scale wts to more accurately assess meeting nutrient needs via TPN    Weight Change: No new weight since Aug 1st. Need to obtain new actual weight.    Estimated energy needs: remain unchanged from previous follow ups  Estimated energy needs using 52 kg IBW:  30-35 kcal/kg (2374-5985 kcal).   1.8-2 g/kg ( g protein).  30-35 mL/kg (1725-1156 mL fluid).     Subjective:  Plan for UGI series today. Pt had nauseous last night and vomited. Current diet order NPO with sips and chips only.    reported pt is allowed to eat "ada".  Per discussion w/ team: pt to increase TPN calories to 2000kcal. See below for TPN recs.      Previous Nutrition Diagnosis:Increased nutrient needs RT fistula & s/p surgery AEB increased kcal/protein needs.     Active [  X]  Resolved [   ]    Goal: PT to meet >75% EER via tolerated route    Recommendations:  1) Recommend 110g AA, 314g Dex, 50g 20% lipids 3-4x/day (2000kcal, 2.1g pro/kg IBW, GIR 2.19). Fluids per team  2) Please take actual weight for trending purposes  3) Increase AA to 110g and Dex to 312g.  4) PO diet per MD discretion   5) Continue to check labs: triglycerides, BG & FS and adjust TPN per MD discretion  6) Check labs for signs of fat malabsorption, consider ADEK vitamin.    Education: Understands meeting nutrient needs via TPN.    Risk Level: High [ X  ] Moderate [   ] Low [   ].

## 2017-10-12 NOTE — PROGRESS NOTE ADULT - ASSESSMENT
IMPRESSION:  Persistent MRSA bacteremia.  Persistent MRSA suggests nidus of infection.  Could also be failure of vancomycin given high ALISHA    Recommend:  1. Continue vancomycin 1250 mg IV q12.  Check trough 30 min before PM dose on 10/13/17  2.  Follow up ELODIA  3.  Central line should be pulled as it's a nidus of infection

## 2017-10-12 NOTE — PROGRESS NOTE ADULT - ASSESSMENT
57 F s/p  SBR, fistula resection, esophagojejunostomy revision w/ post-op course complicated by RTOR for distal anastomosis breakdown, ATN, acute respiratory failure, & septic shock, since resolved.  NPO/TPN/IVF  Pain/nausea control - only benadryl at midnight and dilaudid during VAC change  ISS  L IJ  Nystatin powder, vancomycin  SCDs, lovenox, OOBA  Drains: Malecot in enterotomy site, wound vac to midline  Wounds: Midline xiphoid to pubis incision; DTI & stage 2 pressure ulcer noted. 57 F s/p  SBR, fistula resection, esophagojejunostomy revision w/ post-op course complicated by RTOR for distal anastomosis breakdown, ATN, acute respiratory failure, & septic shock, since resolved, currently bacteremic with MRSA, on vancomycin.      NPO/TPN/IVF only sips and chips  Pain/nausea control - only benadryl at midnight and dilaudid during VAC change  ISS  Line : left subclavian   Nystatin powder, vancomycin  SCDs, lovenox, OOBA  Fistula  2 times a week  ELODIA today  Upper GI series today  Blood culture daily

## 2017-10-13 DIAGNOSIS — K57.92 DIVERTICULITIS OF INTESTINE, PART UNSPECIFIED, WITHOUT PERFORATION OR ABSCESS WITHOUT BLEEDING: ICD-10-CM

## 2017-10-13 DIAGNOSIS — L98.8 OTHER SPECIFIED DISORDERS OF THE SKIN AND SUBCUTANEOUS TISSUE: ICD-10-CM

## 2017-10-13 DIAGNOSIS — E46 UNSPECIFIED PROTEIN-CALORIE MALNUTRITION: ICD-10-CM

## 2017-10-13 LAB
-  CEFAZOLIN: SIGNIFICANT CHANGE UP
-  CEFAZOLIN: SIGNIFICANT CHANGE UP
-  CLINDAMYCIN: SIGNIFICANT CHANGE UP
-  CLINDAMYCIN: SIGNIFICANT CHANGE UP
-  DAPTOMYCIN: SIGNIFICANT CHANGE UP
-  ERYTHROMYCIN: SIGNIFICANT CHANGE UP
-  ERYTHROMYCIN: SIGNIFICANT CHANGE UP
-  LINEZOLID: SIGNIFICANT CHANGE UP
-  OXACILLIN: SIGNIFICANT CHANGE UP
-  OXACILLIN: SIGNIFICANT CHANGE UP
-  PENICILLIN: SIGNIFICANT CHANGE UP
-  PENICILLIN: SIGNIFICANT CHANGE UP
-  RIFAMPIN: SIGNIFICANT CHANGE UP
-  RIFAMPIN: SIGNIFICANT CHANGE UP
-  TETRACYCLINE: SIGNIFICANT CHANGE UP
-  TRIMETHOPRIM/SULFAMETHOXAZOLE: SIGNIFICANT CHANGE UP
-  TRIMETHOPRIM/SULFAMETHOXAZOLE: SIGNIFICANT CHANGE UP
-  VANCOMYCIN: SIGNIFICANT CHANGE UP
-  VANCOMYCIN: SIGNIFICANT CHANGE UP
ANION GAP SERPL CALC-SCNC: 13 MMOL/L — SIGNIFICANT CHANGE UP (ref 5–17)
BUN SERPL-MCNC: 39 MG/DL — HIGH (ref 7–23)
CALCIUM SERPL-MCNC: 9.1 MG/DL — SIGNIFICANT CHANGE UP (ref 8.4–10.5)
CHLORIDE SERPL-SCNC: 102 MMOL/L — SIGNIFICANT CHANGE UP (ref 96–108)
CO2 SERPL-SCNC: 20 MMOL/L — LOW (ref 22–31)
CREAT SERPL-MCNC: 0.55 MG/DL — SIGNIFICANT CHANGE UP (ref 0.5–1.3)
GLUCOSE BLDC GLUCOMTR-MCNC: 121 MG/DL — HIGH (ref 70–99)
GLUCOSE BLDC GLUCOMTR-MCNC: 126 MG/DL — HIGH (ref 70–99)
GLUCOSE BLDC GLUCOMTR-MCNC: 132 MG/DL — HIGH (ref 70–99)
GLUCOSE BLDC GLUCOMTR-MCNC: 151 MG/DL — HIGH (ref 70–99)
GLUCOSE BLDC GLUCOMTR-MCNC: 91 MG/DL — SIGNIFICANT CHANGE UP (ref 70–99)
GLUCOSE SERPL-MCNC: 164 MG/DL — HIGH (ref 70–99)
HCT VFR BLD CALC: 28.3 % — LOW (ref 34.5–45)
HGB BLD-MCNC: 8.8 G/DL — LOW (ref 11.5–15.5)
MAGNESIUM SERPL-MCNC: 2.2 MG/DL — SIGNIFICANT CHANGE UP (ref 1.6–2.6)
MCHC RBC-ENTMCNC: 29.2 PG — SIGNIFICANT CHANGE UP (ref 27–34)
MCHC RBC-ENTMCNC: 31.1 G/DL — LOW (ref 32–36)
MCV RBC AUTO: 94 FL — SIGNIFICANT CHANGE UP (ref 80–100)
METHOD TYPE: SIGNIFICANT CHANGE UP
METHOD TYPE: SIGNIFICANT CHANGE UP
PHOSPHATE SERPL-MCNC: 3.4 MG/DL — SIGNIFICANT CHANGE UP (ref 2.5–4.5)
PLATELET # BLD AUTO: 205 K/UL — SIGNIFICANT CHANGE UP (ref 150–400)
POTASSIUM SERPL-MCNC: 4.2 MMOL/L — SIGNIFICANT CHANGE UP (ref 3.5–5.3)
POTASSIUM SERPL-SCNC: 4.2 MMOL/L — SIGNIFICANT CHANGE UP (ref 3.5–5.3)
RBC # BLD: 3.01 M/UL — LOW (ref 3.8–5.2)
RBC # FLD: 17.8 % — HIGH (ref 10.3–16.9)
SODIUM SERPL-SCNC: 135 MMOL/L — SIGNIFICANT CHANGE UP (ref 135–145)
WBC # BLD: 7.2 K/UL — SIGNIFICANT CHANGE UP (ref 3.8–10.5)
WBC # FLD AUTO: 7.2 K/UL — SIGNIFICANT CHANGE UP (ref 3.8–10.5)

## 2017-10-13 PROCEDURE — 74241: CPT | Mod: 26

## 2017-10-13 PROCEDURE — 99233 SBSQ HOSP IP/OBS HIGH 50: CPT

## 2017-10-13 PROCEDURE — 71010: CPT | Mod: 26

## 2017-10-13 RX ORDER — I.V. FAT EMULSION 20 G/100ML
50 EMULSION INTRAVENOUS
Qty: 0 | Refills: 0 | Status: DISCONTINUED | OUTPATIENT
Start: 2017-10-13 | End: 2017-10-13

## 2017-10-13 RX ORDER — ELECTROLYTE SOLUTION,INJ
1 VIAL (ML) INTRAVENOUS
Qty: 0 | Refills: 0 | Status: DISCONTINUED | OUTPATIENT
Start: 2017-10-13 | End: 2017-10-13

## 2017-10-13 RX ORDER — VANCOMYCIN HCL 1 G
1250 VIAL (EA) INTRAVENOUS EVERY 24 HOURS
Qty: 0 | Refills: 0 | Status: DISCONTINUED | OUTPATIENT
Start: 2017-10-13 | End: 2017-10-13

## 2017-10-13 RX ORDER — VANCOMYCIN HCL 1 G
1250 VIAL (EA) INTRAVENOUS EVERY 24 HOURS
Qty: 0 | Refills: 0 | Status: DISCONTINUED | OUTPATIENT
Start: 2017-10-13 | End: 2017-10-14

## 2017-10-13 RX ADMIN — ESCITALOPRAM OXALATE 20 MILLIGRAM(S): 10 TABLET, FILM COATED ORAL at 21:58

## 2017-10-13 RX ADMIN — Medication 10 MILLIGRAM(S): at 05:38

## 2017-10-13 RX ADMIN — ENOXAPARIN SODIUM 30 MILLIGRAM(S): 100 INJECTION SUBCUTANEOUS at 17:48

## 2017-10-13 RX ADMIN — Medication 5 MILLIGRAM(S): at 21:58

## 2017-10-13 RX ADMIN — Medication 650 MILLIGRAM(S): at 15:45

## 2017-10-13 RX ADMIN — Medication 10 MILLIGRAM(S): at 17:50

## 2017-10-13 RX ADMIN — Medication 650 MILLIGRAM(S): at 00:23

## 2017-10-13 RX ADMIN — Medication 166.67 MILLIGRAM(S): at 11:27

## 2017-10-13 RX ADMIN — Medication 650 MILLIGRAM(S): at 15:02

## 2017-10-13 RX ADMIN — Medication 100 UNIT(S): at 11:09

## 2017-10-13 RX ADMIN — Medication 1 EACH: at 17:50

## 2017-10-13 RX ADMIN — LOSARTAN POTASSIUM 50 MILLIGRAM(S): 100 TABLET, FILM COATED ORAL at 05:38

## 2017-10-13 RX ADMIN — Medication 1 APPLICATION(S): at 05:48

## 2017-10-13 RX ADMIN — Medication 650 MILLIGRAM(S): at 06:38

## 2017-10-13 RX ADMIN — Medication 650 MILLIGRAM(S): at 22:04

## 2017-10-13 RX ADMIN — ENOXAPARIN SODIUM 30 MILLIGRAM(S): 100 INJECTION SUBCUTANEOUS at 07:21

## 2017-10-13 RX ADMIN — Medication 250 MILLIGRAM(S): at 16:43

## 2017-10-13 RX ADMIN — Medication 1: at 15:12

## 2017-10-13 RX ADMIN — ONDANSETRON 4 MILLIGRAM(S): 8 TABLET, FILM COATED ORAL at 05:38

## 2017-10-13 RX ADMIN — Medication 650 MILLIGRAM(S): at 22:45

## 2017-10-13 RX ADMIN — PANTOPRAZOLE SODIUM 40 MILLIGRAM(S): 20 TABLET, DELAYED RELEASE ORAL at 05:38

## 2017-10-13 RX ADMIN — CALCITRIOL 1 MICROGRAM(S): 0.5 CAPSULE ORAL at 11:09

## 2017-10-13 RX ADMIN — NYSTATIN CREAM 1 APPLICATION(S): 100000 CREAM TOPICAL at 17:56

## 2017-10-13 RX ADMIN — Medication 650 MILLIGRAM(S): at 05:38

## 2017-10-13 RX ADMIN — HYDROMORPHONE HYDROCHLORIDE 0.25 MILLIGRAM(S): 2 INJECTION INTRAMUSCULAR; INTRAVENOUS; SUBCUTANEOUS at 11:09

## 2017-10-13 RX ADMIN — NYSTATIN CREAM 1 APPLICATION(S): 100000 CREAM TOPICAL at 05:39

## 2017-10-13 RX ADMIN — Medication 50 MILLIGRAM(S): at 21:57

## 2017-10-13 NOTE — PROGRESS NOTE ADULT - SUBJECTIVE AND OBJECTIVE BOX
INTERVAL HPI/OVERNIGHT EVENTS:    Patient seen and examined.  s/p ELODIA.  No fevers.  On IV vancomycin    CONSTITUTIONAL:  Negative fever or chills, feels well, good appetite  EYES:  Negative  blurry vision or double vision  CARDIOVASCULAR:  Negative for chest pain or palpitations  RESPIRATORY:  Negative for cough, wheezing, or SOB   GASTROINTESTINAL:  Negative for nausea, vomiting, diarrhea, constipation, or abdominal pain  GENITOURINARY:  Negative frequency, urgency or dysuria  NEUROLOGIC:  No headache, confusion, dizziness, lightheadedness      ANTIBIOTICS/RELEVANT:    MEDICATIONS  (STANDING):  calcitriol Injectable 1 MICROGram(s) IV Push <User Schedule>  collagenase Ointment 1 Application(s) Topical daily  cyanocobalamin Injectable 1000 MICROGram(s) SubCutaneous every 7 days  dextrose 5%. 1000 milliLiter(s) (50 mL/Hr) IV Continuous <Continuous>  dextrose 50% Injectable 25 Gram(s) IV Push once  dextrose 50% Injectable 12.5 Gram(s) IV Push once  diazepam    Tablet 5 milliGRAM(s) Oral at bedtime  diphenhydrAMINE   Injectable 50 milliGRAM(s) IV Push at bedtime  enoxaparin Injectable 30 milliGRAM(s) SubCutaneous two times a day  escitalopram 20 milliGRAM(s) Oral daily  fat emulsion 20% Infusion 50 Gram(s) (20.83 mL/Hr) IV Continuous <Continuous>  fat emulsion 20% Infusion 50 Gram(s) (20.8 mL/Hr) IV Continuous <Continuous>  heparin  flush 100 Units/mL Injectable 100 Unit(s) IV Push every other day  HYDROmorphone  Injectable 0.25 milliGRAM(s) IV Push daily  insulin lispro (HumaLOG) corrective regimen sliding scale   SubCutaneous every 6 hours  losartan 50 milliGRAM(s) Oral daily  nystatin Powder 1 Application(s) Topical two times a day  oxybutynin 10 milliGRAM(s) Oral two times a day  pantoprazole  Injectable 40 milliGRAM(s) IV Push daily  Parenteral Nutrition - Adult 1 Each (73 mL/Hr) TPN Continuous <Continuous>  Parenteral Nutrition - Adult 1 Each (73 mL/Hr) TPN Continuous <Continuous>  sodium chloride 0.9% lock flush 20 milliLiter(s) IV Push once  vancomycin  IVPB 1250 milliGRAM(s) IV Intermittent every 24 hours    MEDICATIONS  (PRN):  acetaminophen   Tablet. 650 milliGRAM(s) Oral every 6 hours PRN Moderate Pain (4 - 6)  ondansetron Injectable 4 milliGRAM(s) IV Push every 6 hours PRN Nausea and/or Vomiting  sodium chloride 0.9% lock flush 10 milliLiter(s) IV Push every 1 hour PRN After each medication administration  sodium chloride 0.9% lock flush 10 milliLiter(s) IV Push every 12 hours PRN Lumen of catheter NOT used        Vital Signs Last 24 Hrs  T(C): 36.8 (13 Oct 2017 06:18), Max: 36.8 (13 Oct 2017 06:18)  T(F): 98.3 (13 Oct 2017 06:18), Max: 98.3 (13 Oct 2017 06:18)  HR: 78 (13 Oct 2017 06:18) (78 - 90)  BP: 139/85 (13 Oct 2017 06:18) (113/77 - 139/85)  BP(mean): --  RR: 16 (13 Oct 2017 06:18) (16 - 17)  SpO2: 95% (13 Oct 2017 06:18) (93% - 97%)    PHYSICAL EXAM:  Constitutional: chronically ill, non-toxic  Eyes:  no icterus  Ear/Nose/Throat: no oral lesion, no sinus tenderness on percussion	  Neck:  supple; left IJ  Respiratory: clear  Cardiovascular: NSR  Gastrointestinal:soft, (+) BS, open abdominal wound  Extremities:  + edema  Vascular: DP Pulse:	right normal; left normal      LABS:                        8.8    7.2   )-----------( 205      ( 13 Oct 2017 07:36 )             28.3     10-13    135  |  102  |  39<H>  ----------------------------<  164<H>  4.2   |  20<L>  |  0.55    Ca    9.1      13 Oct 2017 07:36  Phos  3.4     10-13  Mg     2.2     10-13            MICROBIOLOGY:  Culture - Blood (10.11.17 @ 17:08)    Gram Stain:   Aerobic Bottle: Gram Positive Cocci in Clusters  Result called to and read back by_ Ms. SIMONE Hoang RN  10/12/2017 09:40:27    Specimen Source: .Blood Blood    Culture Results:   Culture in progress        RADIOLOGY & ADDITIONAL STUDIES:

## 2017-10-13 NOTE — PROGRESS NOTE ADULT - SUBJECTIVE AND OBJECTIVE BOX
On interval followup, the left subclavian venous catheter site is clean, dry, non-inflamed and non-tender. Afebrile. Notes and recent blood cultures are followed.  Considering a possible recommended new catheter, the right int jugular and subclavian veins on ultrasound exam do not appear to be favorable sites; the left int  jugular vein appears normal.

## 2017-10-13 NOTE — PROGRESS NOTE ADULT - ASSESSMENT
57 F s/p  SBR, fistula resection, esophagojejunostomy revision w/ post-op course complicated by RTOR for distal anastomosis breakdown, ATN, acute respiratory failure, & septic shock, since resolved.  NPO/TPN/IVF  Pain/nausea control - only benadryl at midnight and dilaudid during VAC change  ISS  L IJ  Nystatin powder, vancomycin  SCDs, lovenox, OOBA  Drains: Malecot in enterotomy site, wound vac to midline  Wounds: Midline xiphoid to pubis incision; DTI & stage 2 pressure ulcer noted. 57 F s/p  SBR, fistula resection, esophagojejunostomy revision w/ post-op course complicated by RTOR for distal anastomosis breakdown, ATN, acute respiratory failure, & septic shock, since resolved. Currently bacteremic with MRSA.       NPO/TPN/IVF  Pain/nausea control - only benadryl at midnight and dilaudid during VAC change  ISS/OOB  Get  recs   Get  for ID recs  Cont TPN (incrase amino acids to 110 and dextrose 312, lipids 50)  Vanco at 10am  Nystatin powder  SCDs, lovenox, OOBA  Wounds: LLQ fistula wound, on fistula manager - Tusday and friday

## 2017-10-13 NOTE — PROGRESS NOTE ADULT - ASSESSMENT
IMPRESSION:  MRSA bacteremia, persistent    Recommend:  1.  Continue Vancomycin 1250 mg IV daily (give dose at 6 pm qday)  2.  Check repeat blood cultures today  3.  If patient hemodynamically stable, can hold off on pulling central line given difficulty with IV access  4.  Follow up ELODIA results

## 2017-10-13 NOTE — PROGRESS NOTE ADULT - ATTENDING COMMENTS
Patient seen and examined; MRSA bacteremia noted with unclear etiology. ID following and getting Vancomycin. Central line has been in place since Monday and may need changing.  On TPN and would check nutritional parameters including albumin, prealbumin and transferrin.  Will follow and discuss plans with surgical team.

## 2017-10-13 NOTE — PROGRESS NOTE ADULT - SUBJECTIVE AND OBJECTIVE BOX
INTERVAL HPI/OVERNIGHT EVENTS:  Patient well known to me from ICU. Presently with MRSA bacteremia followed by ID. On TPN with midabdomen fistula      MEDICATIONS  (STANDING):  calcitriol Injectable 1 MICROGram(s) IV Push <User Schedule>  collagenase Ointment 1 Application(s) Topical daily  cyanocobalamin Injectable 1000 MICROGram(s) SubCutaneous every 7 days  dextrose 5%. 1000 milliLiter(s) (50 mL/Hr) IV Continuous <Continuous>  dextrose 50% Injectable 25 Gram(s) IV Push once  dextrose 50% Injectable 12.5 Gram(s) IV Push once  diazepam    Tablet 5 milliGRAM(s) Oral at bedtime  diphenhydrAMINE   Injectable 50 milliGRAM(s) IV Push at bedtime  enoxaparin Injectable 30 milliGRAM(s) SubCutaneous two times a day  escitalopram 20 milliGRAM(s) Oral daily  fat emulsion 20% Infusion 50 Gram(s) (20.83 mL/Hr) IV Continuous <Continuous>  heparin  flush 100 Units/mL Injectable 100 Unit(s) IV Push every other day  HYDROmorphone  Injectable 0.25 milliGRAM(s) IV Push daily  insulin lispro (HumaLOG) corrective regimen sliding scale   SubCutaneous every 6 hours  losartan 50 milliGRAM(s) Oral daily  nystatin Powder 1 Application(s) Topical two times a day  oxybutynin 10 milliGRAM(s) Oral two times a day  pantoprazole  Injectable 40 milliGRAM(s) IV Push daily  Parenteral Nutrition - Adult 1 Each (73 mL/Hr) TPN Continuous <Continuous>  sodium chloride 0.9% lock flush 20 milliLiter(s) IV Push once  vancomycin  IVPB 1250 milliGRAM(s) IV Intermittent every 24 hours    MEDICATIONS  (PRN):  acetaminophen   Tablet. 650 milliGRAM(s) Oral every 6 hours PRN Moderate Pain (4 - 6)  ondansetron Injectable 4 milliGRAM(s) IV Push every 6 hours PRN Nausea and/or Vomiting  sodium chloride 0.9% lock flush 10 milliLiter(s) IV Push every 1 hour PRN After each medication administration  sodium chloride 0.9% lock flush 10 milliLiter(s) IV Push every 12 hours PRN Lumen of catheter NOT used      Allergies    sulfa drugs (Unknown)  sulfamethoxazole (Other)    Intolerances        Vital Signs Last 24 Hrs  T(C): 36.8 (13 Oct 2017 06:18), Max: 36.8 (13 Oct 2017 06:18)  T(F): 98.3 (13 Oct 2017 06:18), Max: 98.3 (13 Oct 2017 06:18)  HR: 78 (13 Oct 2017 06:18) (78 - 90)  BP: 139/85 (13 Oct 2017 06:18) (113/77 - 139/85)  BP(mean): --  RR: 16 (13 Oct 2017 06:18) (16 - 17)  SpO2: 95% (13 Oct 2017 06:18) (93% - 97%)          Constitutional:  Awake     Eyes: CL    ENMT: Negative    Neck: Supple    Back:  no tenderness     Respiratory:  clear    Cardiovascular: S1 S2    Gastrointestinal: soft. Midabdomen with fistula draining.     Genitourinary:    Extremities:  no edema    Vascular:    Neurological:    Skin:    Lymph Nodes:            10-12 @ 07:01  -  10-13 @ 07:00  --------------------------------------------------------  IN: 1938 mL / OUT: 2350 mL / NET: -412 mL      LABS:                        8.8    7.2   )-----------( 205      ( 13 Oct 2017 07:36 )             28.3     10-13    135  |  102  |  39<H>  ----------------------------<  164<H>  4.2   |  20<L>  |  0.55    Ca    9.1      13 Oct 2017 07:36  Phos  3.4     10-13  Mg     2.2     10-13            RADIOLOGY & ADDITIONAL TESTS:

## 2017-10-13 NOTE — PROGRESS NOTE ADULT - SUBJECTIVE AND OBJECTIVE BOX
O/N: BRAYDEN, VSS, UGI pending, nutrition TPN recs left  10/12: TTE   performed no vegetations , EF 69.5%, Dr. Salinas will see the patient, Per Dr. Brady he will call plastic surgeon Dr. Harrell for consult , Per ID must have random vanc level 14.5, F/U UGI , TACHY 109 TEMP 101.3IN THE AM ,     STATUS POST:  7/24: SBR resection, fistula resection, revision of esophagogegunostomy drain through esophageal hiatus in R mediastinum  7/29: IR drainage of 800 cc of succus  7/29: Ex-lap, SB anastamosis in BP limb breakdown with succus throughout abdomen  8/1: abd washout, drainage of abscess, biologic mesh placement, closure of abdominal wall, vac and retention suture  8/7: abd washout, mesh removal, frozen abd noted, LLQ CHECO replaced with benson drain  8/10: leak noted from previous anastamosis site on L side, mid abdomen malecot drain placed in enterotomy, JPs x 2 placed, necrotic fascia debrided, vicryl mesh sutured to fascia circumferentially, xeroform over mesh then vac dressing placed O/N: BRAYDEN, VSS, UGI pending, nutrition TPN recs left  10/12: TTE   performed no vegetations , EF 69.5%, Dr. Salinas will see the patient, Per Dr. Brady he will call plastic surgeon Dr. Harrell for consult , Per ID must have random vanc level 14.5, F/U UGI , TACHY 109 TEMP 101.3IN THE AM ,     STATUS POST:  7/24: SBR resection, fistula resection, revision of esophagogegunostomy drain through esophageal hiatus in R mediastinum  7/29: IR drainage of 800 cc of succus  7/29: Ex-lap, SB anastamosis in BP limb breakdown with succus throughout abdomen  8/1: abd washout, drainage of abscess, biologic mesh placement, closure of abdominal wall, vac and retention suture  8/7: abd washout, mesh removal, frozen abd noted, LLQ CHECO replaced with benson drain  8/10: leak noted from previous anastamosis site on L side, mid abdomen malecot drain placed in enterotomy, JPs x 2 placed, necrotic fascia debrided, vicryl mesh sutured to fascia circumferentially, xeroform over mesh then vac dressing placed       SUBJECTIVE: Patient seen and examined bedside by chief resident. No complains, no nausea/vomtiing, no fever overnight    enoxaparin Injectable 30 milliGRAM(s) SubCutaneous two times a day  heparin  flush 100 Units/mL Injectable 100 Unit(s) IV Push every other day  losartan 50 milliGRAM(s) Oral daily  vancomycin  IVPB 1250 milliGRAM(s) IV Intermittent every 24 hours      Vital Signs Last 24 Hrs  T(C): 36.8 (13 Oct 2017 06:18), Max: 37.6 (12 Oct 2017 08:47)  T(F): 98.3 (13 Oct 2017 06:18), Max: 99.7 (12 Oct 2017 08:47)  HR: 78 (13 Oct 2017 06:18) (78 - 97)  BP: 139/85 (13 Oct 2017 06:18) (107/87 - 139/85)  BP(mean): --  RR: 16 (13 Oct 2017 06:18) (16 - 17)  SpO2: 95% (13 Oct 2017 06:18) (93% - 97%)  I&O's Detail    12 Oct 2017 07:01  -  13 Oct 2017 07:00  --------------------------------------------------------  IN:    Solution: 259 mL    TPN (Total Parenteral Nutrition): 1679 mL  Total IN: 1938 mL    OUT:    Drain: 100 mL    Voided: 2250 mL  Total OUT: 2350 mL    Total NET: -412 mL          General: NAD, resting comfortably in bed  C/V: NSR  Pulm: Nonlabored breathing, no respiratory distress  Abd: soft, NT/ND. fistula manager is intact with around 100cc output. minimal leak.  Extrem: WWP, no edema, SCDs in place        LABS:                        9.9    12.4  )-----------( 248      ( 12 Oct 2017 06:41 )             31.5     10-12    135  |  96  |  32<H>  ----------------------------<  158<H>  4.2   |  20<L>  |  0.62    Ca    9.2      12 Oct 2017 06:41  Phos  3.6     10-12  Mg     1.9     10-12            RADIOLOGY & ADDITIONAL STUDIES:

## 2017-10-14 LAB
-  CEFAZOLIN: SIGNIFICANT CHANGE UP
-  CLINDAMYCIN: SIGNIFICANT CHANGE UP
-  ERYTHROMYCIN: SIGNIFICANT CHANGE UP
-  LINEZOLID: SIGNIFICANT CHANGE UP
-  OXACILLIN: SIGNIFICANT CHANGE UP
-  PENICILLIN: SIGNIFICANT CHANGE UP
-  RIFAMPIN: SIGNIFICANT CHANGE UP
-  TRIMETHOPRIM/SULFAMETHOXAZOLE: SIGNIFICANT CHANGE UP
-  VANCOMYCIN: SIGNIFICANT CHANGE UP
ANION GAP SERPL CALC-SCNC: 13 MMOL/L — SIGNIFICANT CHANGE UP (ref 5–17)
BUN SERPL-MCNC: 36 MG/DL — HIGH (ref 7–23)
CALCIUM SERPL-MCNC: 9.2 MG/DL — SIGNIFICANT CHANGE UP (ref 8.4–10.5)
CHLORIDE SERPL-SCNC: 102 MMOL/L — SIGNIFICANT CHANGE UP (ref 96–108)
CO2 SERPL-SCNC: 23 MMOL/L — SIGNIFICANT CHANGE UP (ref 22–31)
CREAT SERPL-MCNC: 0.51 MG/DL — SIGNIFICANT CHANGE UP (ref 0.5–1.3)
GLUCOSE BLDC GLUCOMTR-MCNC: 139 MG/DL — HIGH (ref 70–99)
GLUCOSE BLDC GLUCOMTR-MCNC: 144 MG/DL — HIGH (ref 70–99)
GLUCOSE BLDC GLUCOMTR-MCNC: 145 MG/DL — HIGH (ref 70–99)
GLUCOSE BLDC GLUCOMTR-MCNC: 168 MG/DL — HIGH (ref 70–99)
GLUCOSE SERPL-MCNC: 144 MG/DL — HIGH (ref 70–99)
HCT VFR BLD CALC: 26.2 % — LOW (ref 34.5–45)
HGB BLD-MCNC: 8.4 G/DL — LOW (ref 11.5–15.5)
MAGNESIUM SERPL-MCNC: 2 MG/DL — SIGNIFICANT CHANGE UP (ref 1.6–2.6)
MCHC RBC-ENTMCNC: 29 PG — SIGNIFICANT CHANGE UP (ref 27–34)
MCHC RBC-ENTMCNC: 32.1 G/DL — SIGNIFICANT CHANGE UP (ref 32–36)
MCV RBC AUTO: 90.3 FL — SIGNIFICANT CHANGE UP (ref 80–100)
METHOD TYPE: SIGNIFICANT CHANGE UP
METHOD TYPE: SIGNIFICANT CHANGE UP
PHOSPHATE SERPL-MCNC: 3.3 MG/DL — SIGNIFICANT CHANGE UP (ref 2.5–4.5)
PLATELET # BLD AUTO: 230 K/UL — SIGNIFICANT CHANGE UP (ref 150–400)
POTASSIUM SERPL-MCNC: 4.4 MMOL/L — SIGNIFICANT CHANGE UP (ref 3.5–5.3)
POTASSIUM SERPL-SCNC: 4.4 MMOL/L — SIGNIFICANT CHANGE UP (ref 3.5–5.3)
RBC # BLD: 2.9 M/UL — LOW (ref 3.8–5.2)
RBC # FLD: 17.9 % — HIGH (ref 10.3–16.9)
SODIUM SERPL-SCNC: 138 MMOL/L — SIGNIFICANT CHANGE UP (ref 135–145)
VANCOMYCIN TROUGH SERPL-MCNC: 12.3 UG/ML — SIGNIFICANT CHANGE UP (ref 10–20)
WBC # BLD: 5.4 K/UL — SIGNIFICANT CHANGE UP (ref 3.8–10.5)
WBC # FLD AUTO: 5.4 K/UL — SIGNIFICANT CHANGE UP (ref 3.8–10.5)

## 2017-10-14 PROCEDURE — 99233 SBSQ HOSP IP/OBS HIGH 50: CPT

## 2017-10-14 RX ORDER — KETOROLAC TROMETHAMINE 30 MG/ML
15 SYRINGE (ML) INJECTION ONCE
Qty: 0 | Refills: 0 | Status: DISCONTINUED | OUTPATIENT
Start: 2017-10-14 | End: 2017-10-14

## 2017-10-14 RX ORDER — I.V. FAT EMULSION 20 G/100ML
50 EMULSION INTRAVENOUS
Qty: 0 | Refills: 0 | Status: DISCONTINUED | OUTPATIENT
Start: 2017-10-14 | End: 2017-10-14

## 2017-10-14 RX ORDER — ELECTROLYTE SOLUTION,INJ
1 VIAL (ML) INTRAVENOUS
Qty: 0 | Refills: 0 | Status: DISCONTINUED | OUTPATIENT
Start: 2017-10-14 | End: 2017-10-14

## 2017-10-14 RX ORDER — VANCOMYCIN HCL 1 G
1500 VIAL (EA) INTRAVENOUS EVERY 24 HOURS
Qty: 0 | Refills: 0 | Status: DISCONTINUED | OUTPATIENT
Start: 2017-10-15 | End: 2017-10-17

## 2017-10-14 RX ADMIN — Medication 650 MILLIGRAM(S): at 13:30

## 2017-10-14 RX ADMIN — Medication 650 MILLIGRAM(S): at 20:00

## 2017-10-14 RX ADMIN — LOSARTAN POTASSIUM 50 MILLIGRAM(S): 100 TABLET, FILM COATED ORAL at 06:34

## 2017-10-14 RX ADMIN — Medication 10 MILLIGRAM(S): at 06:34

## 2017-10-14 RX ADMIN — PANTOPRAZOLE SODIUM 40 MILLIGRAM(S): 20 TABLET, DELAYED RELEASE ORAL at 06:34

## 2017-10-14 RX ADMIN — Medication 50 MILLIGRAM(S): at 21:47

## 2017-10-14 RX ADMIN — NYSTATIN CREAM 1 APPLICATION(S): 100000 CREAM TOPICAL at 06:35

## 2017-10-14 RX ADMIN — HYDROMORPHONE HYDROCHLORIDE 0.25 MILLIGRAM(S): 2 INJECTION INTRAMUSCULAR; INTRAVENOUS; SUBCUTANEOUS at 11:50

## 2017-10-14 RX ADMIN — Medication 15 MILLIGRAM(S): at 03:27

## 2017-10-14 RX ADMIN — Medication 166.67 MILLIGRAM(S): at 15:14

## 2017-10-14 RX ADMIN — ESCITALOPRAM OXALATE 20 MILLIGRAM(S): 10 TABLET, FILM COATED ORAL at 21:48

## 2017-10-14 RX ADMIN — NYSTATIN CREAM 1 APPLICATION(S): 100000 CREAM TOPICAL at 17:25

## 2017-10-14 RX ADMIN — ENOXAPARIN SODIUM 30 MILLIGRAM(S): 100 INJECTION SUBCUTANEOUS at 17:25

## 2017-10-14 RX ADMIN — Medication 1 EACH: at 17:36

## 2017-10-14 RX ADMIN — Medication 650 MILLIGRAM(S): at 06:34

## 2017-10-14 RX ADMIN — Medication 650 MILLIGRAM(S): at 07:00

## 2017-10-14 RX ADMIN — ENOXAPARIN SODIUM 30 MILLIGRAM(S): 100 INJECTION SUBCUTANEOUS at 06:34

## 2017-10-14 RX ADMIN — Medication 15 MILLIGRAM(S): at 03:45

## 2017-10-14 RX ADMIN — Medication 650 MILLIGRAM(S): at 19:30

## 2017-10-14 RX ADMIN — I.V. FAT EMULSION 20.83 GRAM(S): 20 EMULSION INTRAVENOUS at 21:47

## 2017-10-14 RX ADMIN — Medication 10 MILLIGRAM(S): at 17:25

## 2017-10-14 RX ADMIN — Medication 5 MILLIGRAM(S): at 21:47

## 2017-10-14 RX ADMIN — HYDROMORPHONE HYDROCHLORIDE 0.25 MILLIGRAM(S): 2 INJECTION INTRAMUSCULAR; INTRAVENOUS; SUBCUTANEOUS at 11:15

## 2017-10-14 RX ADMIN — Medication 650 MILLIGRAM(S): at 15:03

## 2017-10-14 RX ADMIN — Medication 1 APPLICATION(S): at 12:00

## 2017-10-14 NOTE — PROGRESS NOTE ADULT - ASSESSMENT
57 F s/p  SBR, fistula resection, esophagojejunostomy revision w/ post-op course complicated by RTOR for distal anastomosis breakdown, ATN, acute respiratory failure, & septic shock, since resolved.  NPO/TPN/IVF  Pain/nausea control - only benadryl at midnight and dilaudid during VAC change  ISS  L IJ  Nystatin powder, vancomycin  SCDs, lovenox, OOBA  Fistula pouch  Wounds: Midline xiphoid to pubis incision; DTI & stage 2 pressure ulcer noted.

## 2017-10-14 NOTE — PROGRESS NOTE ADULT - SUBJECTIVE AND OBJECTIVE BOX
O/N: BRAYDEN, VSS  10/13 : Vanco given at 11am, UGI was unremarkable, afebrile. CXR showed no pathology    STATUS POST:   7/24: SBR resection, fistula resection, revision of esophagogegunostomy drain through esophageal hiatus in R mediastinum  7/29: IR drainage of 800 cc of succus  7/29: Ex-lap, SB anastamosis in BP limb breakdown with succus throughout abdomen  8/1: abd washout, drainage of abscess, biologic mesh placement, closure of abdominal wall, vac and retention suture  8/7: abd washout, mesh removal, frozen abd noted, LLQ CHECO replaced with benson drain  8/10: leak noted from previous anastamosis site on L side, mid abdomen malecot drain placed in enterotomy, JPs x 2 placed, necrotic fascia debrided, vicryl mesh sutured to fascia circumferentially, xeroform over mesh then vac dressing placed O/N: BRAYDEN, VSS  10/13 : Vanco given at 11am, UGI was unremarkable, afebrile. CXR showed no pathology    STATUS POST:   7/24: SBR resection, fistula resection, revision of esophagogegunostomy drain through esophageal hiatus in R mediastinum  7/29: IR drainage of 800 cc of succus  7/29: Ex-lap, SB anastamosis in BP limb breakdown with succus throughout abdomen  8/1: abd washout, drainage of abscess, biologic mesh placement, closure of abdominal wall, vac and retention suture  8/7: abd washout, mesh removal, frozen abd noted, LLQ CHECO replaced with benson drain  8/10: leak noted from previous anastamosis site on L side, mid abdomen malecot drain placed in enterotomy, JPs x 2 placed, necrotic fascia debrided, vicryl mesh sutured to fascia circumferentially, xeroform over mesh then vac dressing placed	  :     SUBJECTIVE: Patient denies any complaints   Flatus: [] YES [X] NO             Bowel Movement: [ ] YES [X ] NO  Pain (0-10):            Pain Control Adequate: [X ] YES [ ] NO  Nausea: [ ] YES [X ] NO            Vomiting: [ ] YES [X ] NO  Diarrhea: [ ] YES [X ] NO         Constipation: [ ] YES [ X] NO     Chest Pain: [ ] YES [X ] NO    SOB:  [ ] YES [ X] NO    enoxaparin Injectable 30 milliGRAM(s) SubCutaneous two times a day  heparin  flush 100 Units/mL Injectable 100 Unit(s) IV Push every other day  losartan 50 milliGRAM(s) Oral daily  vancomycin  IVPB 1250 milliGRAM(s) IV Intermittent every 24 hours      Vital Signs Last 24 Hrs  T(C): 36.3 (14 Oct 2017 09:20), Max: 36.8 (13 Oct 2017 21:30)  T(F): 97.4 (14 Oct 2017 09:20), Max: 98.2 (13 Oct 2017 21:30)  HR: 77 (14 Oct 2017 09:20) (77 - 88)  BP: 146/84 (14 Oct 2017 09:20) (120/78 - 146/84)  BP(mean): --  RR: 16 (14 Oct 2017 09:20) (16 - 17)  SpO2: 95% (14 Oct 2017 09:20) (95% - 98%)  I&O's Detail    13 Oct 2017 07:01  -  14 Oct 2017 07:00  --------------------------------------------------------  IN:    Fat Emulsion 20%: 83.2 mL    Solution: 250 mL    TPN (Total Parenteral Nutrition): 1752 mL  Total IN: 2085.2 mL    OUT:    Drain: 700 mL  Total OUT: 700 mL    Total NET: 1385.2 mL      14 Oct 2017 07:01  -  14 Oct 2017 16:35  --------------------------------------------------------  IN:    Solution: 250 mL  Total IN: 250 mL    OUT:    Drain: 275 mL    Voided: 550 mL  Total OUT: 825 mL    Total NET: -575 mL          General: NAD, resting comfortably in bed  C/V: NSR  Pulm: Nonlabored breathing, no respiratory distress  Abd:  abdomen soft , wound clean dry and granulating tissue observed , fistula present   Extrem: WWP, no edema, SCDs in place        LABS:                        8.4    5.4   )-----------( 230      ( 14 Oct 2017 07:48 )             26.2     10-14    138  |  102  |  36<H>  ----------------------------<  144<H>  4.4   |  23  |  0.51    Ca    9.2      14 Oct 2017 07:48  Phos  3.3     10-14  Mg     2.0     10-14            RADIOLOGY & ADDITIONAL STUDIES:

## 2017-10-14 NOTE — PROGRESS NOTE ADULT - SUBJECTIVE AND OBJECTIVE BOX
INTERVAL HPI/OVERNIGHT EVENTS:    Patient was seen and examined. No complaints    CONSTITUTIONAL:  Negative fever or chills, feels well, good appetite  EYES:  Negative  blurry vision or double vision  CARDIOVASCULAR:  Negative for chest pain or palpitations  RESPIRATORY:  Negative for cough, wheezing, or SOB   GASTROINTESTINAL:  Negative for nausea, vomiting, diarrhea, constipation, or abdominal pain  GENITOURINARY:  Negative frequency, urgency or dysuria  NEUROLOGIC:  No headache, confusion, dizziness, lightheadedness      ANTIBIOTICS/RELEVANT:    MEDICATIONS  (STANDING):  calcitriol Injectable 1 MICROGram(s) IV Push <User Schedule>  collagenase Ointment 1 Application(s) Topical daily  cyanocobalamin Injectable 1000 MICROGram(s) SubCutaneous every 7 days  dextrose 5%. 1000 milliLiter(s) (50 mL/Hr) IV Continuous <Continuous>  dextrose 50% Injectable 25 Gram(s) IV Push once  dextrose 50% Injectable 12.5 Gram(s) IV Push once  diazepam    Tablet 5 milliGRAM(s) Oral at bedtime  diphenhydrAMINE   Injectable 50 milliGRAM(s) IV Push at bedtime  enoxaparin Injectable 30 milliGRAM(s) SubCutaneous two times a day  escitalopram 20 milliGRAM(s) Oral daily  fat emulsion 20% Infusion 50 Gram(s) (20.83 mL/Hr) IV Continuous <Continuous>  fat emulsion 20% Infusion 50 Gram(s) (20.83 mL/Hr) IV Continuous <Continuous>  heparin  flush 100 Units/mL Injectable 100 Unit(s) IV Push every other day  HYDROmorphone  Injectable 0.25 milliGRAM(s) IV Push daily  insulin lispro (HumaLOG) corrective regimen sliding scale   SubCutaneous every 6 hours  losartan 50 milliGRAM(s) Oral daily  nystatin Powder 1 Application(s) Topical two times a day  oxybutynin 10 milliGRAM(s) Oral two times a day  pantoprazole  Injectable 40 milliGRAM(s) IV Push daily  Parenteral Nutrition - Adult 1 Each (73 mL/Hr) TPN Continuous <Continuous>  Parenteral Nutrition - Adult 1 Each (73 mL/Hr) TPN Continuous <Continuous>  sodium chloride 0.9% lock flush 20 milliLiter(s) IV Push once  vancomycin  IVPB 1250 milliGRAM(s) IV Intermittent every 24 hours    MEDICATIONS  (PRN):  acetaminophen   Tablet. 650 milliGRAM(s) Oral every 6 hours PRN Moderate Pain (4 - 6)  ondansetron Injectable 4 milliGRAM(s) IV Push every 6 hours PRN Nausea and/or Vomiting  sodium chloride 0.9% lock flush 10 milliLiter(s) IV Push every 1 hour PRN After each medication administration  sodium chloride 0.9% lock flush 10 milliLiter(s) IV Push every 12 hours PRN Lumen of catheter NOT used        Vital Signs Last 24 Hrs  T(C): 36.3 (14 Oct 2017 09:20), Max: 36.8 (13 Oct 2017 16:08)  T(F): 97.4 (14 Oct 2017 09:20), Max: 98.3 (13 Oct 2017 16:08)  HR: 77 (14 Oct 2017 09:20) (77 - 88)  BP: 146/84 (14 Oct 2017 09:20) (112/75 - 146/84)  BP(mean): --  RR: 16 (14 Oct 2017 09:20) (16 - 18)  SpO2: 95% (14 Oct 2017 09:20) (95% - 98%)    PHYSICAL EXAM:  Constitutional:  non-toxic  Eyes: no icterus  Ear/Nose/Throat: no oral lesion, no sinus tenderness on percussion	  Neck:no JVD, no lymphadenopathy, supple; left subclavian line  Respiratory: CTA eileen  Cardiovascular: S1S2 RRR, no murmurs  Gastrointestinal:soft, (+) BS, no HSMl; open abdominal wound  Extremities: + edema  Vascular: DP Pulse:	right normal; left normal      LABS:                        8.4    5.4   )-----------( 230      ( 14 Oct 2017 07:48 )             26.2     10-14    138  |  102  |  36<H>  ----------------------------<  144<H>  4.4   |  23  |  0.51    Ca    9.2      14 Oct 2017 07:48  Phos  3.3     10-14  Mg     2.0     10-14        vanco level:  12.4    MICROBIOLOGY:  Culture - Blood (10.11.17 @ 17:08)    -  Cefazolin: R 16    -  Linezolid: S 2    -  Oxacillin: R >2    -  Vancomycin: S 4    -  Erythromycin: R >4    -  RIF- Rifampin: S <=1    -  Trimethoprim/Sulfamethoxazole: S 2/38    -  Clindamycin: R 1    -  Penicillin: R >8    Gram Stain:   Aerobic Bottle: Gram Positive Cocci in Clusters  Result called to and read back by_ Ms. SIMONE Hoang RN  10/12/2017 09:40:27    Specimen Source: .Blood Blood    Organism: Coag Negative Staphylococcus    Culture Results:   Growth in aerobic bottle: Coag Negative Staphylococcus  See previous culture for susceptibility results    Organism Identification: Coag Negative Staphylococcus    Method Type: ALISHA        RADIOLOGY & ADDITIONAL STUDIES:    TTE:  negative for vegetations

## 2017-10-14 NOTE — PROGRESS NOTE ADULT - ASSESSMENT
IMPRESSION:  MRSA bacteremia.  Repeat cultures show coag negative staph.  MRSA has been cleared.  Trough slightly low    Recommend:  1.  Increase vancomycin to 1500 mg IV daily (starting 10/15)  2.  Check repeat blood culture with next blood draw  3.  No need to pull central line as MRSA has cleared   4. Will need repeat trough on 10/16/17

## 2017-10-15 LAB
GLUCOSE BLDC GLUCOMTR-MCNC: 135 MG/DL — HIGH (ref 70–99)
GLUCOSE BLDC GLUCOMTR-MCNC: 135 MG/DL — HIGH (ref 70–99)
GLUCOSE BLDC GLUCOMTR-MCNC: 140 MG/DL — HIGH (ref 70–99)
GLUCOSE BLDC GLUCOMTR-MCNC: 159 MG/DL — HIGH (ref 70–99)
GLUCOSE BLDC GLUCOMTR-MCNC: 86 MG/DL — SIGNIFICANT CHANGE UP (ref 70–99)

## 2017-10-15 RX ORDER — ELECTROLYTE SOLUTION,INJ
1 VIAL (ML) INTRAVENOUS
Qty: 0 | Refills: 0 | Status: DISCONTINUED | OUTPATIENT
Start: 2017-10-15 | End: 2017-10-15

## 2017-10-15 RX ADMIN — Medication 10 MILLIGRAM(S): at 06:48

## 2017-10-15 RX ADMIN — NYSTATIN CREAM 1 APPLICATION(S): 100000 CREAM TOPICAL at 17:42

## 2017-10-15 RX ADMIN — Medication 10 MILLIGRAM(S): at 17:29

## 2017-10-15 RX ADMIN — LOSARTAN POTASSIUM 50 MILLIGRAM(S): 100 TABLET, FILM COATED ORAL at 06:43

## 2017-10-15 RX ADMIN — PANTOPRAZOLE SODIUM 40 MILLIGRAM(S): 20 TABLET, DELAYED RELEASE ORAL at 06:44

## 2017-10-15 RX ADMIN — ENOXAPARIN SODIUM 30 MILLIGRAM(S): 100 INJECTION SUBCUTANEOUS at 06:44

## 2017-10-15 RX ADMIN — Medication 2: at 17:42

## 2017-10-15 RX ADMIN — Medication 1 APPLICATION(S): at 06:45

## 2017-10-15 RX ADMIN — ENOXAPARIN SODIUM 30 MILLIGRAM(S): 100 INJECTION SUBCUTANEOUS at 17:29

## 2017-10-15 RX ADMIN — Medication 650 MILLIGRAM(S): at 02:30

## 2017-10-15 RX ADMIN — Medication 650 MILLIGRAM(S): at 01:57

## 2017-10-15 RX ADMIN — Medication 5 MILLIGRAM(S): at 22:32

## 2017-10-15 RX ADMIN — Medication 650 MILLIGRAM(S): at 11:55

## 2017-10-15 RX ADMIN — Medication 1 EACH: at 17:29

## 2017-10-15 RX ADMIN — Medication 650 MILLIGRAM(S): at 19:23

## 2017-10-15 RX ADMIN — NYSTATIN CREAM 1 APPLICATION(S): 100000 CREAM TOPICAL at 06:46

## 2017-10-15 RX ADMIN — Medication 100 UNIT(S): at 11:52

## 2017-10-15 RX ADMIN — ESCITALOPRAM OXALATE 20 MILLIGRAM(S): 10 TABLET, FILM COATED ORAL at 22:32

## 2017-10-15 RX ADMIN — Medication 650 MILLIGRAM(S): at 12:54

## 2017-10-15 RX ADMIN — Medication 50 MILLIGRAM(S): at 22:32

## 2017-10-15 RX ADMIN — ONDANSETRON 4 MILLIGRAM(S): 8 TABLET, FILM COATED ORAL at 09:39

## 2017-10-15 RX ADMIN — Medication 300 MILLIGRAM(S): at 15:16

## 2017-10-15 RX ADMIN — Medication 650 MILLIGRAM(S): at 20:12

## 2017-10-15 NOTE — PROGRESS NOTE ADULT - SUBJECTIVE AND OBJECTIVE BOX
O/N: BRAYDEN  10/14: TPN reordered, Vanc trough 12.3 per ID increase vanco to 1500 cc tomorrow O/N: BRAYDEN  10/14: TPN reordered, Vanc trough 12.3 per ID increase vanco to 1500 cc tomorrow     SUBJECTIVE: Patient seen and examined bedside by chief resident. No acute events overnight. Denies fever, chills, nausea, emesis, chest pain, dyspnea, abdominal pain.     Vital Signs Last 24 Hrs  T(C): 36.9 (15 Oct 2017 09:30), Max: 37.1 (15 Oct 2017 00:34)  T(F): 98.4 (15 Oct 2017 09:30), Max: 98.7 (15 Oct 2017 00:34)  HR: 83 (15 Oct 2017 09:30) (76 - 84)  BP: 169/88 (15 Oct 2017 09:30) (139/79 - 169/88)  BP(mean): --  RR: 16 (15 Oct 2017 09:30) (16 - 18)  SpO2: 94% (15 Oct 2017 09:30) (94% - 97%)  I&O's Detail    14 Oct 2017 07:01  -  15 Oct 2017 07:00  --------------------------------------------------------  IN:    Solution: 250 mL    TPN (Total Parenteral Nutrition): 1460 mL    TPN (Total Parenteral Nutrition): 124.8 mL  Total IN: 1834.8 mL    OUT:    Drain: 480 mL    Voided: 800 mL  Total OUT: 1280 mL    Total NET: 554.8 mL          General: NAD, resting comfortably in bed  Pulm: Nonlabored breathing, no respiratory distress  Abd: soft, NT  Extrem: WWP, no edema, SCDs in place        LABS:                        8.4    5.4   )-----------( 230      ( 14 Oct 2017 07:48 )             26.2     10-14    138  |  102  |  36<H>  ----------------------------<  144<H>  4.4   |  23  |  0.51    Ca    9.2      14 Oct 2017 07:48  Phos  3.3     10-14  Mg     2.0     10-14            RADIOLOGY & ADDITIONAL STUDIES:

## 2017-10-16 LAB
ANION GAP SERPL CALC-SCNC: 14 MMOL/L — SIGNIFICANT CHANGE UP (ref 5–17)
BUN SERPL-MCNC: 24 MG/DL — HIGH (ref 7–23)
CALCIUM SERPL-MCNC: 10.2 MG/DL — SIGNIFICANT CHANGE UP (ref 8.4–10.5)
CHLORIDE SERPL-SCNC: 100 MMOL/L — SIGNIFICANT CHANGE UP (ref 96–108)
CO2 SERPL-SCNC: 25 MMOL/L — SIGNIFICANT CHANGE UP (ref 22–31)
CREAT SERPL-MCNC: 0.6 MG/DL — SIGNIFICANT CHANGE UP (ref 0.5–1.3)
GLUCOSE BLDC GLUCOMTR-MCNC: 130 MG/DL — HIGH (ref 70–99)
GLUCOSE BLDC GLUCOMTR-MCNC: 141 MG/DL — HIGH (ref 70–99)
GLUCOSE SERPL-MCNC: 140 MG/DL — HIGH (ref 70–99)
HCT VFR BLD CALC: 28.2 % — LOW (ref 34.5–45)
HGB BLD-MCNC: 8.9 G/DL — LOW (ref 11.5–15.5)
MAGNESIUM SERPL-MCNC: 2 MG/DL — SIGNIFICANT CHANGE UP (ref 1.6–2.6)
MCHC RBC-ENTMCNC: 28.7 PG — SIGNIFICANT CHANGE UP (ref 27–34)
MCHC RBC-ENTMCNC: 31.6 G/DL — LOW (ref 32–36)
MCV RBC AUTO: 91 FL — SIGNIFICANT CHANGE UP (ref 80–100)
PHOSPHATE SERPL-MCNC: 4.3 MG/DL — SIGNIFICANT CHANGE UP (ref 2.5–4.5)
PLATELET # BLD AUTO: 326 K/UL — SIGNIFICANT CHANGE UP (ref 150–400)
POTASSIUM SERPL-MCNC: 4.6 MMOL/L — SIGNIFICANT CHANGE UP (ref 3.5–5.3)
POTASSIUM SERPL-SCNC: 4.6 MMOL/L — SIGNIFICANT CHANGE UP (ref 3.5–5.3)
RBC # BLD: 3.1 M/UL — LOW (ref 3.8–5.2)
RBC # FLD: 17.7 % — HIGH (ref 10.3–16.9)
SODIUM SERPL-SCNC: 139 MMOL/L — SIGNIFICANT CHANGE UP (ref 135–145)
WBC # BLD: 6.2 K/UL — SIGNIFICANT CHANGE UP (ref 3.8–10.5)
WBC # FLD AUTO: 6.2 K/UL — SIGNIFICANT CHANGE UP (ref 3.8–10.5)

## 2017-10-16 PROCEDURE — 99233 SBSQ HOSP IP/OBS HIGH 50: CPT

## 2017-10-16 RX ORDER — ELECTROLYTE SOLUTION,INJ
1 VIAL (ML) INTRAVENOUS
Qty: 0 | Refills: 0 | Status: DISCONTINUED | OUTPATIENT
Start: 2017-10-16 | End: 2017-10-16

## 2017-10-16 RX ORDER — I.V. FAT EMULSION 20 G/100ML
50 EMULSION INTRAVENOUS
Qty: 0 | Refills: 0 | Status: DISCONTINUED | OUTPATIENT
Start: 2017-10-16 | End: 2017-10-16

## 2017-10-16 RX ORDER — HYDROMORPHONE HYDROCHLORIDE 2 MG/ML
0.25 INJECTION INTRAMUSCULAR; INTRAVENOUS; SUBCUTANEOUS ONCE
Qty: 0 | Refills: 0 | Status: DISCONTINUED | OUTPATIENT
Start: 2017-10-16 | End: 2017-10-16

## 2017-10-16 RX ORDER — HYDROMORPHONE HYDROCHLORIDE 2 MG/ML
0.25 INJECTION INTRAMUSCULAR; INTRAVENOUS; SUBCUTANEOUS DAILY
Qty: 0 | Refills: 0 | Status: DISCONTINUED | OUTPATIENT
Start: 2017-10-17 | End: 2017-10-17

## 2017-10-16 RX ADMIN — LOSARTAN POTASSIUM 50 MILLIGRAM(S): 100 TABLET, FILM COATED ORAL at 06:57

## 2017-10-16 RX ADMIN — Medication 300 MILLIGRAM(S): at 14:07

## 2017-10-16 RX ADMIN — PANTOPRAZOLE SODIUM 40 MILLIGRAM(S): 20 TABLET, DELAYED RELEASE ORAL at 06:57

## 2017-10-16 RX ADMIN — ENOXAPARIN SODIUM 30 MILLIGRAM(S): 100 INJECTION SUBCUTANEOUS at 06:57

## 2017-10-16 RX ADMIN — Medication 650 MILLIGRAM(S): at 17:43

## 2017-10-16 RX ADMIN — HYDROMORPHONE HYDROCHLORIDE 0.25 MILLIGRAM(S): 2 INJECTION INTRAMUSCULAR; INTRAVENOUS; SUBCUTANEOUS at 16:30

## 2017-10-16 RX ADMIN — NYSTATIN CREAM 1 APPLICATION(S): 100000 CREAM TOPICAL at 06:57

## 2017-10-16 RX ADMIN — Medication 650 MILLIGRAM(S): at 11:05

## 2017-10-16 RX ADMIN — Medication 650 MILLIGRAM(S): at 18:30

## 2017-10-16 RX ADMIN — ONDANSETRON 4 MILLIGRAM(S): 8 TABLET, FILM COATED ORAL at 07:11

## 2017-10-16 RX ADMIN — Medication 650 MILLIGRAM(S): at 23:30

## 2017-10-16 RX ADMIN — Medication 1 EACH: at 17:43

## 2017-10-16 RX ADMIN — Medication 10 MILLIGRAM(S): at 06:57

## 2017-10-16 RX ADMIN — I.V. FAT EMULSION 20.83 GRAM(S): 20 EMULSION INTRAVENOUS at 17:43

## 2017-10-16 RX ADMIN — ENOXAPARIN SODIUM 30 MILLIGRAM(S): 100 INJECTION SUBCUTANEOUS at 17:42

## 2017-10-16 RX ADMIN — Medication 650 MILLIGRAM(S): at 02:55

## 2017-10-16 RX ADMIN — Medication 5 MILLIGRAM(S): at 21:37

## 2017-10-16 RX ADMIN — Medication 50 MILLIGRAM(S): at 21:37

## 2017-10-16 RX ADMIN — CALCITRIOL 1 MICROGRAM(S): 0.5 CAPSULE ORAL at 14:07

## 2017-10-16 RX ADMIN — Medication 650 MILLIGRAM(S): at 03:25

## 2017-10-16 RX ADMIN — HYDROMORPHONE HYDROCHLORIDE 0.25 MILLIGRAM(S): 2 INJECTION INTRAMUSCULAR; INTRAVENOUS; SUBCUTANEOUS at 16:15

## 2017-10-16 RX ADMIN — NYSTATIN CREAM 1 APPLICATION(S): 100000 CREAM TOPICAL at 17:56

## 2017-10-16 RX ADMIN — Medication 1 APPLICATION(S): at 06:57

## 2017-10-16 RX ADMIN — Medication 650 MILLIGRAM(S): at 12:00

## 2017-10-16 RX ADMIN — Medication 10 MILLIGRAM(S): at 17:42

## 2017-10-16 RX ADMIN — ESCITALOPRAM OXALATE 20 MILLIGRAM(S): 10 TABLET, FILM COATED ORAL at 21:37

## 2017-10-16 NOTE — PROGRESS NOTE ADULT - SUBJECTIVE AND OBJECTIVE BOX
On interval followup, the left subclavian venous catheter site is clean, dry and non-inflamed. Afebrile. Notes, cultures and progress are followed.

## 2017-10-16 NOTE — PROGRESS NOTE ADULT - SUBJECTIVE AND OBJECTIVE BOX
INTERVAL HPI/OVERNIGHT EVENTS:    Patient was seen and examined at bedside.  Complaining of back (chronic) and abdominal pain.  Requesting dilaudid.  No fevers    CONSTITUTIONAL:  Negative fever or chills, feels well, good appetite  EYES:  Negative  blurry vision or double vision  CARDIOVASCULAR:  Negative for chest pain or palpitations  RESPIRATORY:  Negative for cough, wheezing, or SOB   GASTROINTESTINAL:  Negative for nausea, vomiting, diarrhea, constipation, + abdominal pain  GENITOURINARY:  Negative frequency, urgency or dysuria  NEUROLOGIC:  No headache, confusion, dizziness, lightheadedness      ANTIBIOTICS/RELEVANT:    MEDICATIONS  (STANDING):  calcitriol Injectable 1 MICROGram(s) IV Push <User Schedule>  collagenase Ointment 1 Application(s) Topical daily  cyanocobalamin Injectable 1000 MICROGram(s) SubCutaneous every 7 days  dextrose 5%. 1000 milliLiter(s) (50 mL/Hr) IV Continuous <Continuous>  dextrose 50% Injectable 25 Gram(s) IV Push once  dextrose 50% Injectable 12.5 Gram(s) IV Push once  diazepam    Tablet 5 milliGRAM(s) Oral at bedtime  diphenhydrAMINE   Injectable 50 milliGRAM(s) IV Push at bedtime  enoxaparin Injectable 30 milliGRAM(s) SubCutaneous two times a day  escitalopram 20 milliGRAM(s) Oral daily  fat emulsion 20% Infusion 50 Gram(s) (20.83 mL/Hr) IV Continuous <Continuous>  heparin  flush 100 Units/mL Injectable 100 Unit(s) IV Push every other day  insulin lispro (HumaLOG) corrective regimen sliding scale   SubCutaneous every 6 hours  losartan 50 milliGRAM(s) Oral daily  nystatin Powder 1 Application(s) Topical two times a day  oxybutynin 10 milliGRAM(s) Oral two times a day  pantoprazole  Injectable 40 milliGRAM(s) IV Push daily  Parenteral Nutrition - Adult 1 Each (73 mL/Hr) TPN Continuous <Continuous>  Parenteral Nutrition - Adult 1 Each (73 mL/Hr) TPN Continuous <Continuous>  sodium chloride 0.9% lock flush 20 milliLiter(s) IV Push once  vancomycin  IVPB 1500 milliGRAM(s) IV Intermittent every 24 hours    MEDICATIONS  (PRN):  acetaminophen   Tablet. 650 milliGRAM(s) Oral every 6 hours PRN Moderate Pain (4 - 6)  ondansetron Injectable 4 milliGRAM(s) IV Push every 6 hours PRN Nausea and/or Vomiting  sodium chloride 0.9% lock flush 10 milliLiter(s) IV Push every 1 hour PRN After each medication administration  sodium chloride 0.9% lock flush 10 milliLiter(s) IV Push every 12 hours PRN Lumen of catheter NOT used        Vital Signs Last 24 Hrs  T(C): 36.6 (16 Oct 2017 05:56), Max: 36.7 (15 Oct 2017 13:11)  T(F): 97.8 (16 Oct 2017 05:56), Max: 98 (15 Oct 2017 13:11)  HR: 73 (16 Oct 2017 05:56) (70 - 87)  BP: 142/90 (16 Oct 2017 05:56) (142/90 - 156/89)  BP(mean): --  RR: 17 (16 Oct 2017 05:56) (16 - 18)  SpO2: 95% (16 Oct 2017 05:56) (95% - 98%)    PHYSICAL EXAM:  Constitutional: chronically ill appearing  Eyes: no icterus  Ear/Nose/Throat: no oral lesion, no sinus tenderness on percussion	  Neck:  supple  Respiratory: CTA eileen, left sided subclavian line appears clean  Cardiovascular: S1S2 RRR, no murmurs  Gastrointestinal:soft, colostomy in place, large open wound   Extremities: + edema  Vascular: DP Pulse:	right normal; left normal      LABS:                        8.9    6.2   )-----------( 326      ( 16 Oct 2017 06:33 )             28.2     10-16    139  |  100  |  24<H>  ----------------------------<  140<H>  4.6   |  25  |  0.60    Ca    10.2      16 Oct 2017 06:32  Phos  4.3     10-16  Mg     2.0     10-16            MICROBIOLOGY:  Culture - Blood (10.11.17 @ 17:08)    -  Cefazolin: R 16    -  Linezolid: S 2    -  Oxacillin: R >2    -  Vancomycin: S 4    -  Erythromycin: R >4    -  RIF- Rifampin: S <=1    -  Trimethoprim/Sulfamethoxazole: S 2/38    -  Clindamycin: R 1    Gram Stain:   Aerobic Bottle: Gram Positive Cocci in Clusters  Result called to and read back by_ Ms. SIMONE Hoang RN  10/12/2017 09:40:27    -  Penicillin: R >8    Specimen Source: .Blood Blood    Organism: Coag Negative Staphylococcus    Culture Results:   Growth in aerobic bottle: Coag Negative Staphylococcus  See previous culture for susceptibility results    Organism Identification: Coag Negative Staphylococcus    Method Type: ALISHA        RADIOLOGY & ADDITIONAL STUDIES:

## 2017-10-16 NOTE — PROGRESS NOTE ADULT - SUBJECTIVE AND OBJECTIVE BOX
O/N: BRAYDEN  10/15: vanco increased to 1500mg qd O/N: BRAYDEN  10/15: vanco increased to 1500mg qd    SUBJECTIVE: Patient complains of pain around the fistula   Flatus: [] YES [X] NO             Bowel Movement: [ ] YES [ X] NO  Pain (0-10):            Pain Control Adequate: [X ] YES [ ] NO  Nausea: [ ] YES [X ] NO            Vomiting: [ ] YES [X ] NO  Diarrhea: [ ] YES [X ] NO         Constipation: [ ] YES [X ] NO     Chest Pain: [ ] YES [X ] NO    SOB:  [ ] YES [X ] NO    enoxaparin Injectable 30 milliGRAM(s) SubCutaneous two times a day  heparin  flush 100 Units/mL Injectable 100 Unit(s) IV Push every other day  losartan 50 milliGRAM(s) Oral daily  vancomycin  IVPB 1500 milliGRAM(s) IV Intermittent every 24 hours      Vital Signs Last 24 Hrs  T(C): 36.6 (16 Oct 2017 05:56), Max: 36.9 (15 Oct 2017 09:30)  T(F): 97.8 (16 Oct 2017 05:56), Max: 98.4 (15 Oct 2017 09:30)  HR: 73 (16 Oct 2017 05:56) (70 - 87)  BP: 142/90 (16 Oct 2017 05:56) (142/90 - 169/88)  BP(mean): --  RR: 17 (16 Oct 2017 05:56) (16 - 18)  SpO2: 95% (16 Oct 2017 05:56) (94% - 98%)  I&O's Detail    15 Oct 2017 07:01  -  16 Oct 2017 07:00  --------------------------------------------------------  IN:    Oral Fluid: 60 mL    TPN (Total Parenteral Nutrition): 876 mL  Total IN: 936 mL    OUT:    Drain: 100 mL    Voided: 2050 mL  Total OUT: 2150 mL    Total NET: -1214 mL          General: NAD, resting comfortably in bed  C/V: NSR  Pulm: Nonlabored breathing, no respiratory distress  Abd: soft, NT/ND.  Extrem: WWP, no edema, SCDs in place        LABS:                        8.9    6.2   )-----------( 326      ( 16 Oct 2017 06:33 )             28.2     10-16    139  |  100  |  24<H>  ----------------------------<  140<H>  4.6   |  25  |  0.60    Ca    10.2      16 Oct 2017 06:32  Phos  4.3     10-16  Mg     2.0     10-16            RADIOLOGY & ADDITIONAL STUDIES: O/N: BRAYDEN  10/15: vanco increased to 1500mg qd    SUBJECTIVE: Patient complains of pain around the fistula   Flatus: [] YES [X] NO             Bowel Movement: [ ] YES [ X] NO  Pain (0-10):            Pain Control Adequate: [X ] YES [ ] NO  Nausea: [ ] YES [X ] NO            Vomiting: [ ] YES [X ] NO  Diarrhea: [ ] YES [X ] NO         Constipation: [ ] YES [X ] NO     Chest Pain: [ ] YES [X ] NO    SOB:  [ ] YES [X ] NO    enoxaparin Injectable 30 milliGRAM(s) SubCutaneous two times a day  heparin  flush 100 Units/mL Injectable 100 Unit(s) IV Push every other day  losartan 50 milliGRAM(s) Oral daily  vancomycin  IVPB 1500 milliGRAM(s) IV Intermittent every 24 hours      Vital Signs Last 24 Hrs  T(C): 36.6 (16 Oct 2017 05:56), Max: 36.9 (15 Oct 2017 09:30)  T(F): 97.8 (16 Oct 2017 05:56), Max: 98.4 (15 Oct 2017 09:30)  HR: 73 (16 Oct 2017 05:56) (70 - 87)  BP: 142/90 (16 Oct 2017 05:56) (142/90 - 169/88)  BP(mean): --  RR: 17 (16 Oct 2017 05:56) (16 - 18)  SpO2: 95% (16 Oct 2017 05:56) (94% - 98%)  I&O's Detail    15 Oct 2017 07:01  -  16 Oct 2017 07:00  --------------------------------------------------------  IN:    Oral Fluid: 60 mL    TPN (Total Parenteral Nutrition): 876 mL  Total IN: 936 mL    OUT:    Drain: 100 mL    Voided: 2050 mL  Total OUT: 2150 mL    Total NET: -1214 mL          General: NAD, resting comfortably in bed  C/V: NSR  Pulm: Nonlabored breathing, no respiratory distress  Abd: soft, wound granulating , TTP around wound site.  Extrem: WWP, no edema, SCDs in place        LABS:                        8.9    6.2   )-----------( 326      ( 16 Oct 2017 06:33 )             28.2     10-16    139  |  100  |  24<H>  ----------------------------<  140<H>  4.6   |  25  |  0.60    Ca    10.2      16 Oct 2017 06:32  Phos  4.3     10-16  Mg     2.0     10-16            RADIOLOGY & ADDITIONAL STUDIES:

## 2017-10-16 NOTE — PROGRESS NOTE ADULT - ASSESSMENT
IMPRESSION:  MRSA bacteremia, bacteremia has cleared    Recommend:  1. Continue vancomycin  2.  Check vancomycin trough 30 min prior to dose today (goal is 15-20)  3.  Will need 4 weeks of IV vancomycin (day 1 = 10/11/2017)  4.  Check repeat blood culture one week after stopping Vancomycin to document clearance    ID will sign off. Please call with any questions

## 2017-10-17 LAB
CULTURE RESULTS: SIGNIFICANT CHANGE UP
GLUCOSE BLDC GLUCOMTR-MCNC: 111 MG/DL — HIGH (ref 70–99)
GLUCOSE BLDC GLUCOMTR-MCNC: 133 MG/DL — HIGH (ref 70–99)
GLUCOSE BLDC GLUCOMTR-MCNC: 142 MG/DL — HIGH (ref 70–99)
ORGANISM # SPEC MICROSCOPIC CNT: SIGNIFICANT CHANGE UP
SPECIMEN SOURCE: SIGNIFICANT CHANGE UP
VANCOMYCIN TROUGH SERPL-MCNC: 12.4 UG/ML — SIGNIFICANT CHANGE UP (ref 10–20)

## 2017-10-17 RX ORDER — VANCOMYCIN HCL 1 G
1500 VIAL (EA) INTRAVENOUS ONCE
Qty: 0 | Refills: 0 | Status: COMPLETED | OUTPATIENT
Start: 2017-10-17 | End: 2017-10-17

## 2017-10-17 RX ORDER — I.V. FAT EMULSION 20 G/100ML
50 EMULSION INTRAVENOUS ONCE
Qty: 0 | Refills: 0 | Status: COMPLETED | OUTPATIENT
Start: 2017-10-17 | End: 2017-10-17

## 2017-10-17 RX ORDER — VANCOMYCIN HCL 1 G
1500 VIAL (EA) INTRAVENOUS EVERY 24 HOURS
Qty: 0 | Refills: 0 | Status: DISCONTINUED | OUTPATIENT
Start: 2017-10-18 | End: 2017-10-19

## 2017-10-17 RX ORDER — VANCOMYCIN HCL 1 G
VIAL (EA) INTRAVENOUS
Qty: 0 | Refills: 0 | Status: DISCONTINUED | OUTPATIENT
Start: 2017-10-17 | End: 2017-10-19

## 2017-10-17 RX ORDER — ELECTROLYTE SOLUTION,INJ
1 VIAL (ML) INTRAVENOUS
Qty: 0 | Refills: 0 | Status: DISCONTINUED | OUTPATIENT
Start: 2017-10-17 | End: 2017-10-17

## 2017-10-17 RX ORDER — HYDROMORPHONE HYDROCHLORIDE 2 MG/ML
0.5 INJECTION INTRAMUSCULAR; INTRAVENOUS; SUBCUTANEOUS ONCE
Qty: 0 | Refills: 0 | Status: DISCONTINUED | OUTPATIENT
Start: 2017-10-17 | End: 2017-10-17

## 2017-10-17 RX ADMIN — Medication 650 MILLIGRAM(S): at 13:03

## 2017-10-17 RX ADMIN — ENOXAPARIN SODIUM 30 MILLIGRAM(S): 100 INJECTION SUBCUTANEOUS at 07:01

## 2017-10-17 RX ADMIN — ESCITALOPRAM OXALATE 20 MILLIGRAM(S): 10 TABLET, FILM COATED ORAL at 21:23

## 2017-10-17 RX ADMIN — Medication 650 MILLIGRAM(S): at 00:30

## 2017-10-17 RX ADMIN — Medication 650 MILLIGRAM(S): at 19:14

## 2017-10-17 RX ADMIN — PANTOPRAZOLE SODIUM 40 MILLIGRAM(S): 20 TABLET, DELAYED RELEASE ORAL at 07:02

## 2017-10-17 RX ADMIN — Medication 10 MILLIGRAM(S): at 18:02

## 2017-10-17 RX ADMIN — Medication 10 MILLIGRAM(S): at 07:01

## 2017-10-17 RX ADMIN — Medication 100 UNIT(S): at 13:03

## 2017-10-17 RX ADMIN — LOSARTAN POTASSIUM 50 MILLIGRAM(S): 100 TABLET, FILM COATED ORAL at 07:01

## 2017-10-17 RX ADMIN — Medication 650 MILLIGRAM(S): at 19:44

## 2017-10-17 RX ADMIN — Medication 650 MILLIGRAM(S): at 06:40

## 2017-10-17 RX ADMIN — NYSTATIN CREAM 1 APPLICATION(S): 100000 CREAM TOPICAL at 18:02

## 2017-10-17 RX ADMIN — Medication 300 MILLIGRAM(S): at 18:02

## 2017-10-17 RX ADMIN — Medication 1 APPLICATION(S): at 07:00

## 2017-10-17 RX ADMIN — NYSTATIN CREAM 1 APPLICATION(S): 100000 CREAM TOPICAL at 07:09

## 2017-10-17 RX ADMIN — Medication 650 MILLIGRAM(S): at 13:33

## 2017-10-17 RX ADMIN — ENOXAPARIN SODIUM 30 MILLIGRAM(S): 100 INJECTION SUBCUTANEOUS at 18:02

## 2017-10-17 RX ADMIN — Medication 650 MILLIGRAM(S): at 05:40

## 2017-10-17 RX ADMIN — Medication 50 MILLIGRAM(S): at 21:23

## 2017-10-17 RX ADMIN — HYDROMORPHONE HYDROCHLORIDE 0.5 MILLIGRAM(S): 2 INJECTION INTRAMUSCULAR; INTRAVENOUS; SUBCUTANEOUS at 11:24

## 2017-10-17 RX ADMIN — Medication 1 EACH: at 18:03

## 2017-10-17 RX ADMIN — HYDROMORPHONE HYDROCHLORIDE 0.5 MILLIGRAM(S): 2 INJECTION INTRAMUSCULAR; INTRAVENOUS; SUBCUTANEOUS at 11:39

## 2017-10-17 RX ADMIN — Medication 5 MILLIGRAM(S): at 21:23

## 2017-10-17 RX ADMIN — I.V. FAT EMULSION 20.83 GRAM(S): 20 EMULSION INTRAVENOUS at 21:27

## 2017-10-17 NOTE — CHART NOTE - NSCHARTNOTEFT_GEN_A_CORE
Admitting Diagnosis:   57 F s/p  SBR, fistula resection, esophagojejunostomy revision w/ post-op course complicated by RTOR for distal anastomosis breakdown, ATN, acute respiratory failure, & septic shock, since resolved.  STATUS POST:    : SBR resection, fistula resection, revision of esophagogegunostomy drain through esophageal hiatus in R mediastinum  : IR drainage of 800 cc of succus  : Ex-lap, SB anastamosis in BP limb breakdown with succus throughout abdomen  : abd washout, drainage of abscess, biologic mesh placement, closure of abdominal wall, vac and retention suture  : abd washout, mesh removal, frozen abd noted, LLQ CHECO replaced with benson drain  8/10: leak noted from previous anastamosis site on L side, mid abdomen malecot drain placed in enterotomy, JPs x 2 placed, necrotic fascia debrided, vicryl mesh sutured to fascia circumferentially, xeroform over mesh then vac dressing placed	      PAST MEDICAL & SURGICAL HISTORY:  Reflux  Obesity  DVT (deep venous thrombosis):  neck  Diverticulitis  Hypertension  Elective surgery: abodominal wall surgery  dec 2016  Elective surgery: 2016 gastric bypass revision  Gastric bypass status for obesity: gastric sleeve, 2012  S/P breast biopsy: , left  S/P colon resection:   S/P knee surgery: repair ,   right; left  Umbilical hernia:   S/P appendectomy:   S/P tonsillectomy:       Current Nutrition Order:        PO Intake: Good (%) [   ]  Fair (50-75%) [   ] Poor (<25%) [   ]    GI Issues:     Pain:    Skin Integrity:    Labs:   10-16    139  |  100  |  24<H>  ----------------------------<  140<H>  4.6   |  25  |  0.60    Ca    10.2      16 Oct 2017 06:32  Phos  4.3     10-  Mg     2.0     10-16      CAPILLARY BLOOD GLUCOSE      POCT Blood Glucose.: 111 mg/dL (17 Oct 2017 11:16)  POCT Blood Glucose.: 142 mg/dL (17 Oct 2017 06:17)  POCT Blood Glucose.: 141 mg/dL (16 Oct 2017 23:35)  POCT Blood Glucose.: 130 mg/dL (16 Oct 2017 17:50)      Medications:  MEDICATIONS  (STANDING):  calcitriol Injectable 1 MICROGram(s) IV Push <User Schedule>  collagenase Ointment 1 Application(s) Topical daily  cyanocobalamin Injectable 1000 MICROGram(s) SubCutaneous every 7 days  dextrose 5%. 1000 milliLiter(s) (50 mL/Hr) IV Continuous <Continuous>  dextrose 50% Injectable 25 Gram(s) IV Push once  dextrose 50% Injectable 12.5 Gram(s) IV Push once  diazepam    Tablet 5 milliGRAM(s) Oral at bedtime  diphenhydrAMINE   Injectable 50 milliGRAM(s) IV Push at bedtime  enoxaparin Injectable 30 milliGRAM(s) SubCutaneous two times a day  escitalopram 20 milliGRAM(s) Oral daily  fat emulsion 20% IVPB 50 Gram(s) IV Intermittent once  heparin  flush 100 Units/mL Injectable 100 Unit(s) IV Push every other day  insulin lispro (HumaLOG) corrective regimen sliding scale   SubCutaneous every 6 hours  losartan 50 milliGRAM(s) Oral daily  nystatin Powder 1 Application(s) Topical two times a day  oxybutynin 10 milliGRAM(s) Oral two times a day  pantoprazole  Injectable 40 milliGRAM(s) IV Push daily  Parenteral Nutrition - Adult 1 Each (73 mL/Hr) TPN Continuous <Continuous>  Parenteral Nutrition - Adult 1 Each (73 mL/Hr) TPN Continuous <Continuous>  sodium chloride 0.9% lock flush 20 milliLiter(s) IV Push once    MEDICATIONS  (PRN):  acetaminophen   Tablet. 650 milliGRAM(s) Oral every 6 hours PRN Moderate Pain (4 - 6)  ondansetron Injectable 4 milliGRAM(s) IV Push every 6 hours PRN Nausea and/or Vomiting  sodium chloride 0.9% lock flush 10 milliLiter(s) IV Push every 1 hour PRN After each medication administration  sodium chloride 0.9% lock flush 10 milliLiter(s) IV Push every 12 hours PRN Lumen of catheter NOT used      Weight:  Daily     Daily Weight in k.6 (17 Oct 2017 13:10)    Weight Change:     Estimated energy needs:     Subjective:     Previous Nutrition Diagnosis:    Active [   ]  Resolved [   ]    If resolved, new PES:     Goal:    Recommendations:    Education:     Risk Level: High [   ] Moderate [   ] Low [   ] Admitting Diagnosis:   57 F s/p  SBR, fistula resection, esophagojejunostomy revision w/ post-op course complicated by RTOR for distal anastomosis breakdown, ATN, acute respiratory failure, & septic shock, since resolved.  STATUS POST:    : SBR resection, fistula resection, revision of esophagogegunostomy drain through esophageal hiatus in R mediastinum  : IR drainage of 800 cc of succus  : Ex-lap, SB anastamosis in BP limb breakdown with succus throughout abdomen  : abd washout, drainage of abscess, biologic mesh placement, closure of abdominal wall, vac and retention suture  : abd washout, mesh removal, frozen abd noted, LLQ CHECO replaced with benson drain  8/10: leak noted from previous anastamosis site on L side, mid abdomen malecot drain placed in enterotomy, JPs x 2 placed, necrotic fascia debrided, vicryl mesh sutured to fascia circumferentially, xeroform over mesh then vac dressing placed	      PAST MEDICAL & SURGICAL HISTORY:  Reflux  Obesity  DVT (deep venous thrombosis):  neck  Diverticulitis  Hypertension  Elective surgery: abodominal wall surgery  dec 2016  Elective surgery: 2016 gastric bypass revision  Gastric bypass status for obesity: gastric sleeve, 2012  S/P breast biopsy: , left  S/P colon resection:   S/P knee surgery: repair ,   right; left  Umbilical hernia:   S/P appendectomy:   S/P tonsillectomy:       Current Nutrition Order: With Ice Chips/Sips of Water   PN via PICC line:  110g AA, 314g Dex, 50g 20% lipids (2000.8kcal), 1750ml fluid       PO Intake: Good (%) [   ]  Fair (50-75%) [   ] Poor (<25%) [   ]-- n/a    GI Issues:  none     Pain: pt w/ 9/10 back pain, RN aware    Skin Integrity: -Unstageable pressure ulcer on sacrum  wound vac to midline abdomen         Labs:   10-16    139  |  100  |  24<H>  ----------------------------<  140<H>  4.6   |  25  |  0.60    Ca    10.2      16 Oct 2017 06:32  Phos  4.3     10-16  Mg     2.0     10-16      CAPILLARY BLOOD GLUCOSE      POCT Blood Glucose.: 111 mg/dL (17 Oct 2017 11:16)  POCT Blood Glucose.: 142 mg/dL (17 Oct 2017 06:17)  POCT Blood Glucose.: 141 mg/dL (16 Oct 2017 23:35)  POCT Blood Glucose.: 130 mg/dL (16 Oct 2017 17:50)      Medications:  MEDICATIONS  (STANDING):  calcitriol Injectable 1 MICROGram(s) IV Push <User Schedule>  collagenase Ointment 1 Application(s) Topical daily  cyanocobalamin Injectable 1000 MICROGram(s) SubCutaneous every 7 days  dextrose 5%. 1000 milliLiter(s) (50 mL/Hr) IV Continuous <Continuous>  dextrose 50% Injectable 25 Gram(s) IV Push once  dextrose 50% Injectable 12.5 Gram(s) IV Push once  diazepam    Tablet 5 milliGRAM(s) Oral at bedtime  diphenhydrAMINE   Injectable 50 milliGRAM(s) IV Push at bedtime  enoxaparin Injectable 30 milliGRAM(s) SubCutaneous two times a day  escitalopram 20 milliGRAM(s) Oral daily  fat emulsion 20% IVPB 50 Gram(s) IV Intermittent once  heparin  flush 100 Units/mL Injectable 100 Unit(s) IV Push every other day  insulin lispro (HumaLOG) corrective regimen sliding scale   SubCutaneous every 6 hours  losartan 50 milliGRAM(s) Oral daily  nystatin Powder 1 Application(s) Topical two times a day  oxybutynin 10 milliGRAM(s) Oral two times a day  pantoprazole  Injectable 40 milliGRAM(s) IV Push daily  Parenteral Nutrition - Adult 1 Each (73 mL/Hr) TPN Continuous <Continuous>  Parenteral Nutrition - Adult 1 Each (73 mL/Hr) TPN Continuous <Continuous>  sodium chloride 0.9% lock flush 20 milliLiter(s) IV Push once    MEDICATIONS  (PRN):  acetaminophen   Tablet. 650 milliGRAM(s) Oral every 6 hours PRN Moderate Pain (4 - 6)  ondansetron Injectable 4 milliGRAM(s) IV Push every 6 hours PRN Nausea and/or Vomiting  sodium chloride 0.9% lock flush 10 milliLiter(s) IV Push every 1 hour PRN After each medication administration  sodium chloride 0.9% lock flush 10 milliLiter(s) IV Push every 12 hours PRN Lumen of catheter NOT used      Weight:   Daily     Daily Weight in k.6 (17 Oct 2017 13:10)    Weight Change: 24.9kg wt loss since wt on : 99.5kg    Estimated energy needs: IBW: 52.3KG, 143% of IBW; IBW utilized for needs  30-35 kcal/kg (5095-2095 kcal).   1.8-2 g/kg ( g protein).  30-35 mL/kg (8830-5379 mL fluid).     Subjective: TPN order has changed per previous recs. pt receives 110g AA, 314g Dex, 50g 20% lipids (2000kcal, 2.1g pro/kg IBW, GIR 2.19). Pt tolerating TPN, not consuming anything other than ice chips via po. Denies any GI discomfort or states 9/10 pain currently.     Previous Nutrition Diagnosis: Increased nutrient needs RT fistula & s/p surgery AEB increased     Active [  x ]  Resolved [   ]    If resolved, new PES:     Goal:pt to meet % needs from tolerated route    Recommendations: -continue on current TPN  -Continue to check labs: triglycerides, BG & FS   -Check labs for signs of fat malabsorption, consider ADEK vitamin.    Education: meeting nutritional needs via TPN    Risk Level: High [ X ] Moderate [   ] Low [   ]

## 2017-10-17 NOTE — PROGRESS NOTE ADULT - SUBJECTIVE AND OBJECTIVE BOX
INTERVAL HPI/OVERNIGHT EVENTS:  No chief complaint. Notes reviewed:       MEDICATIONS  (STANDING):  calcitriol Injectable 1 MICROGram(s) IV Push <User Schedule>  collagenase Ointment 1 Application(s) Topical daily  cyanocobalamin Injectable 1000 MICROGram(s) SubCutaneous every 7 days  dextrose 5%. 1000 milliLiter(s) (50 mL/Hr) IV Continuous <Continuous>  dextrose 50% Injectable 25 Gram(s) IV Push once  dextrose 50% Injectable 12.5 Gram(s) IV Push once  diazepam    Tablet 5 milliGRAM(s) Oral at bedtime  diphenhydrAMINE   Injectable 50 milliGRAM(s) IV Push at bedtime  enoxaparin Injectable 30 milliGRAM(s) SubCutaneous two times a day  escitalopram 20 milliGRAM(s) Oral daily  heparin  flush 100 Units/mL Injectable 100 Unit(s) IV Push every other day  insulin lispro (HumaLOG) corrective regimen sliding scale   SubCutaneous every 6 hours  losartan 50 milliGRAM(s) Oral daily  nystatin Powder 1 Application(s) Topical two times a day  oxybutynin 10 milliGRAM(s) Oral two times a day  pantoprazole  Injectable 40 milliGRAM(s) IV Push daily  Parenteral Nutrition - Adult 1 Each (73 mL/Hr) TPN Continuous <Continuous>  sodium chloride 0.9% lock flush 20 milliLiter(s) IV Push once    MEDICATIONS  (PRN):  acetaminophen   Tablet. 650 milliGRAM(s) Oral every 6 hours PRN Moderate Pain (4 - 6)  ondansetron Injectable 4 milliGRAM(s) IV Push every 6 hours PRN Nausea and/or Vomiting  sodium chloride 0.9% lock flush 10 milliLiter(s) IV Push every 1 hour PRN After each medication administration  sodium chloride 0.9% lock flush 10 milliLiter(s) IV Push every 12 hours PRN Lumen of catheter NOT used      Allergies    sulfa drugs (Unknown)  sulfamethoxazole (Other)    Intolerances        Vital Signs Last 24 Hrs  T(C): 36.4 (17 Oct 2017 09:30), Max: 37 (17 Oct 2017 05:58)  T(F): 97.6 (17 Oct 2017 09:30), Max: 98.6 (17 Oct 2017 05:58)  HR: 79 (17 Oct 2017 09:30) (77 - 89)  BP: 152/90 (17 Oct 2017 09:30) (144/83 - 156/92)  BP(mean): --  RR: 16 (17 Oct 2017 09:30) (16 - 18)  SpO2: 95% (17 Oct 2017 09:30) (92% - 96%)          Constitutional: Awake    Eyes: CL    ENMT: Negative    Neck: Supple    Back:  no tenderness     Respiratory:  clear    Cardiovascular: S1 S2    Gastrointestinal: no change    Genitourinary:    Extremities: no edema    Vascular:    Neurological:    Skin:    Lymph Nodes:            10-16 @ 07:01  -  10-17 @ 07:00  --------------------------------------------------------  IN: 2142.4 mL / OUT: 1050 mL / NET: 1092.4 mL      LABS:                        8.9    6.2   )-----------( 326      ( 16 Oct 2017 06:33 )             28.2     10-16    139  |  100  |  24<H>  ----------------------------<  140<H>  4.6   |  25  |  0.60    Ca    10.2      16 Oct 2017 06:32  Phos  4.3     10-16  Mg     2.0     10-16            RADIOLOGY & ADDITIONAL TESTS:

## 2017-10-17 NOTE — PROGRESS NOTE ADULT - SUBJECTIVE AND OBJECTIVE BOX
remaiins same  despite efforts and many conversations continues to decline PT  I do believe that the timing for reconstruction will be right in a few weeks but she has let her physical strength deteriorate and is more concerned with getting her way with manipulative behavior than advancing

## 2017-10-17 NOTE — PROGRESS NOTE ADULT - ATTENDING COMMENTS
Patient seen and examined. Appears stable at this time. On TPN and NPO. Vancomycin as per ID Will follow

## 2017-10-17 NOTE — PROGRESS NOTE ADULT - ASSESSMENT
57 F s/p  SBR, fistula resection, esophagojejunostomy revision w/ post-op course complicated by RTOR for distal anastomosis breakdown, ATN, acute respiratory failure, & septic shock, since resolved.    Vanco trough today at 2pm  NPO/TPN/IVF  Pain/nausea control - only benadryl at midnight and dilaudid during VAC change  ISS  L IJ  Nystatin powder, vancomycin  SCDs, lovenox, OOBA  Fistula pouch - change 2 times weekly  Wounds: Midline xiphoid to pubis incision; DTI & stage 2 pressure ulcer noted.

## 2017-10-17 NOTE — PROGRESS NOTE ADULT - SUBJECTIVE AND OBJECTIVE BOX
ON: BRAYDEN  10/16: Dressing changed, wound vac placed  with wound care nurse Nimco for KCI, VSS   VANC TROUGH missed by nurse vanco trough must bryan drawn at 2 pm tomorrow         57 F s/p  SBR, fistula resection, esophagojejunostomy revision w/ post-op course complicated by RTOR for distal anastomosis breakdown, ATN, acute respiratory failure, & septic shock, since resolved.  NPO/TPN/IVF  Pain/nausea control - only benadryl at midnight and dilaudid during VAC change  ISS  L IJ  Nystatin powder, vancomycin  SCDs, lovenox, OOBA  Fistula pouch  Wounds: Midline xiphoid to pubis incision; DTI & stage 2 pressure ulcer noted. ON: BRAYDEN  10/16: Dressing changed, wound vac placed  with wound care nurse Nimco for KCI, VSS   VANC TROUGH missed by nurse ro trough must bryan drawn at 2 pm tomorrow     STATUS POST:    7/24: SBR resection, fistula resection, revision of esophagogegunostomy drain through esophageal hiatus in R mediastinum  7/29: IR drainage of 800 cc of succus  7/29: Ex-lap, SB anastamosis in BP limb breakdown with succus throughout abdomen  8/1: abd washout, drainage of abscess, biologic mesh placement, closure of abdominal wall, vac and retention suture  8/7: abd washout, mesh removal, frozen abd noted, LLQ CHECO replaced with benson drain  8/10: leak noted from previous anastamosis site on L side, mid abdomen malecot drain placed in enterotomy, JPs x 2 placed, necrotic fascia debrided, vicryl mesh sutured to fascia circumferentially, xeroform over mesh then vac dressing placed	      SUBJECTIVE: Patient seen and examined bedside by chief resident. No nausea/vomitng, no abd pain, VAC change yesterday    enoxaparin Injectable 30 milliGRAM(s) SubCutaneous two times a day  heparin  flush 100 Units/mL Injectable 100 Unit(s) IV Push every other day  losartan 50 milliGRAM(s) Oral daily  vancomycin  IVPB 1500 milliGRAM(s) IV Intermittent every 24 hours      Vital Signs Last 24 Hrs  T(C): 37 (17 Oct 2017 05:58), Max: 37 (17 Oct 2017 05:58)  T(F): 98.6 (17 Oct 2017 05:58), Max: 98.6 (17 Oct 2017 05:58)  HR: 89 (17 Oct 2017 05:58) (77 - 89)  BP: 153/94 (17 Oct 2017 05:58) (144/83 - 156/92)  BP(mean): --  RR: 16 (17 Oct 2017 05:58) (16 - 18)  SpO2: 95% (17 Oct 2017 05:58) (92% - 96%)  I&O's Detail    16 Oct 2017 07:01  -  17 Oct 2017 07:00  --------------------------------------------------------  IN:    Fat Emulsion 20%: 249.6 mL    TPN (Total Parenteral Nutrition): 1606 mL  Total IN: 1855.6 mL    OUT:    Voided: 1000 mL  Total OUT: 1000 mL    Total NET: 855.6 mL          General: NAD, resting comfortably in bed  C/V: NSR  Pulm: Nonlabored breathing, no respiratory distress  Abd: soft, NT/ND. wound VAC intact, no leak  Extrem: WWP, no edema, SCDs in place        LABS:                        8.9    6.2   )-----------( 326      ( 16 Oct 2017 06:33 )             28.2     10-16    139  |  100  |  24<H>  ----------------------------<  140<H>  4.6   |  25  |  0.60    Ca    10.2      16 Oct 2017 06:32  Phos  4.3     10-16  Mg     2.0     10-16            RADIOLOGY & ADDITIONAL STUDIES:

## 2017-10-18 LAB
ANION GAP SERPL CALC-SCNC: 12 MMOL/L — SIGNIFICANT CHANGE UP (ref 5–17)
BUN SERPL-MCNC: 25 MG/DL — HIGH (ref 7–23)
CALCIUM SERPL-MCNC: 10.2 MG/DL — SIGNIFICANT CHANGE UP (ref 8.4–10.5)
CHLORIDE SERPL-SCNC: 98 MMOL/L — SIGNIFICANT CHANGE UP (ref 96–108)
CO2 SERPL-SCNC: 26 MMOL/L — SIGNIFICANT CHANGE UP (ref 22–31)
CREAT SERPL-MCNC: 0.54 MG/DL — SIGNIFICANT CHANGE UP (ref 0.5–1.3)
GLUCOSE BLDC GLUCOMTR-MCNC: 116 MG/DL — HIGH (ref 70–99)
GLUCOSE BLDC GLUCOMTR-MCNC: 159 MG/DL — HIGH (ref 70–99)
GLUCOSE BLDC GLUCOMTR-MCNC: 80 MG/DL — SIGNIFICANT CHANGE UP (ref 70–99)
GLUCOSE SERPL-MCNC: 79 MG/DL — SIGNIFICANT CHANGE UP (ref 70–99)
HCT VFR BLD CALC: 33.6 % — LOW (ref 34.5–45)
HGB BLD-MCNC: 10.3 G/DL — LOW (ref 11.5–15.5)
MAGNESIUM SERPL-MCNC: 2 MG/DL — SIGNIFICANT CHANGE UP (ref 1.6–2.6)
MCHC RBC-ENTMCNC: 28.9 PG — SIGNIFICANT CHANGE UP (ref 27–34)
MCHC RBC-ENTMCNC: 30.7 G/DL — LOW (ref 32–36)
MCV RBC AUTO: 94.1 FL — SIGNIFICANT CHANGE UP (ref 80–100)
PHOSPHATE SERPL-MCNC: 3.5 MG/DL — SIGNIFICANT CHANGE UP (ref 2.5–4.5)
PLATELET # BLD AUTO: 371 K/UL — SIGNIFICANT CHANGE UP (ref 150–400)
POTASSIUM SERPL-MCNC: 4.5 MMOL/L — SIGNIFICANT CHANGE UP (ref 3.5–5.3)
POTASSIUM SERPL-SCNC: 4.5 MMOL/L — SIGNIFICANT CHANGE UP (ref 3.5–5.3)
RBC # BLD: 3.57 M/UL — LOW (ref 3.8–5.2)
RBC # FLD: 17.9 % — HIGH (ref 10.3–16.9)
SODIUM SERPL-SCNC: 136 MMOL/L — SIGNIFICANT CHANGE UP (ref 135–145)
WBC # BLD: 10.6 K/UL — HIGH (ref 3.8–10.5)
WBC # FLD AUTO: 10.6 K/UL — HIGH (ref 3.8–10.5)

## 2017-10-18 RX ORDER — HYDROMORPHONE HYDROCHLORIDE 2 MG/ML
0.25 INJECTION INTRAMUSCULAR; INTRAVENOUS; SUBCUTANEOUS ONCE
Qty: 0 | Refills: 0 | Status: DISCONTINUED | OUTPATIENT
Start: 2017-10-18 | End: 2017-10-18

## 2017-10-18 RX ORDER — I.V. FAT EMULSION 20 G/100ML
50 EMULSION INTRAVENOUS
Qty: 0 | Refills: 0 | Status: DISCONTINUED | OUTPATIENT
Start: 2017-10-18 | End: 2017-10-18

## 2017-10-18 RX ORDER — ELECTROLYTE SOLUTION,INJ
1 VIAL (ML) INTRAVENOUS
Qty: 0 | Refills: 0 | Status: DISCONTINUED | OUTPATIENT
Start: 2017-10-18 | End: 2017-10-18

## 2017-10-18 RX ADMIN — Medication 1 APPLICATION(S): at 06:19

## 2017-10-18 RX ADMIN — HYDROMORPHONE HYDROCHLORIDE 0.25 MILLIGRAM(S): 2 INJECTION INTRAMUSCULAR; INTRAVENOUS; SUBCUTANEOUS at 12:30

## 2017-10-18 RX ADMIN — Medication 5 MILLIGRAM(S): at 22:40

## 2017-10-18 RX ADMIN — PANTOPRAZOLE SODIUM 40 MILLIGRAM(S): 20 TABLET, DELAYED RELEASE ORAL at 06:18

## 2017-10-18 RX ADMIN — NYSTATIN CREAM 1 APPLICATION(S): 100000 CREAM TOPICAL at 06:18

## 2017-10-18 RX ADMIN — ONDANSETRON 4 MILLIGRAM(S): 8 TABLET, FILM COATED ORAL at 00:53

## 2017-10-18 RX ADMIN — Medication 50 MILLIGRAM(S): at 22:42

## 2017-10-18 RX ADMIN — ESCITALOPRAM OXALATE 20 MILLIGRAM(S): 10 TABLET, FILM COATED ORAL at 22:41

## 2017-10-18 RX ADMIN — NYSTATIN CREAM 1 APPLICATION(S): 100000 CREAM TOPICAL at 18:47

## 2017-10-18 RX ADMIN — I.V. FAT EMULSION 31.25 GRAM(S): 20 EMULSION INTRAVENOUS at 22:41

## 2017-10-18 RX ADMIN — Medication 2: at 17:41

## 2017-10-18 RX ADMIN — ENOXAPARIN SODIUM 30 MILLIGRAM(S): 100 INJECTION SUBCUTANEOUS at 17:41

## 2017-10-18 RX ADMIN — LOSARTAN POTASSIUM 50 MILLIGRAM(S): 100 TABLET, FILM COATED ORAL at 06:18

## 2017-10-18 RX ADMIN — ONDANSETRON 4 MILLIGRAM(S): 8 TABLET, FILM COATED ORAL at 22:40

## 2017-10-18 RX ADMIN — Medication 650 MILLIGRAM(S): at 07:18

## 2017-10-18 RX ADMIN — ENOXAPARIN SODIUM 30 MILLIGRAM(S): 100 INJECTION SUBCUTANEOUS at 06:18

## 2017-10-18 RX ADMIN — Medication 1 EACH: at 17:41

## 2017-10-18 RX ADMIN — HYDROMORPHONE HYDROCHLORIDE 0.25 MILLIGRAM(S): 2 INJECTION INTRAMUSCULAR; INTRAVENOUS; SUBCUTANEOUS at 15:42

## 2017-10-18 RX ADMIN — Medication 300 MILLIGRAM(S): at 17:41

## 2017-10-18 RX ADMIN — Medication 650 MILLIGRAM(S): at 07:39

## 2017-10-18 RX ADMIN — CALCITRIOL 1 MICROGRAM(S): 0.5 CAPSULE ORAL at 15:28

## 2017-10-18 RX ADMIN — Medication 10 MILLIGRAM(S): at 06:18

## 2017-10-18 RX ADMIN — Medication 650 MILLIGRAM(S): at 23:15

## 2017-10-18 RX ADMIN — Medication 650 MILLIGRAM(S): at 01:14

## 2017-10-18 RX ADMIN — Medication 10 MILLIGRAM(S): at 17:41

## 2017-10-18 RX ADMIN — HYDROMORPHONE HYDROCHLORIDE 0.25 MILLIGRAM(S): 2 INJECTION INTRAMUSCULAR; INTRAVENOUS; SUBCUTANEOUS at 12:00

## 2017-10-18 RX ADMIN — Medication 650 MILLIGRAM(S): at 02:14

## 2017-10-18 RX ADMIN — PREGABALIN 1000 MICROGRAM(S): 225 CAPSULE ORAL at 15:28

## 2017-10-18 RX ADMIN — Medication 650 MILLIGRAM(S): at 22:43

## 2017-10-18 RX ADMIN — HYDROMORPHONE HYDROCHLORIDE 0.25 MILLIGRAM(S): 2 INJECTION INTRAMUSCULAR; INTRAVENOUS; SUBCUTANEOUS at 16:05

## 2017-10-18 NOTE — PROGRESS NOTE ADULT - SUBJECTIVE AND OBJECTIVE BOX
Psychiatry Brief Follow Up Note    Patient is being actively followed by psychology intern Mark Murdock for supportive psychotherapy.  I attempted to see patient myself twice today; at 8:50am patient was found sleeping, declined participating in interview despite encouragement and asked me to return around noon.  I returned at 1215 to see patient; patient in middle of dressing change which I was told could take up to an hour and patient's sister waiting to see her.  I will attempt to see the patient again tomorrow.    Millie Hatfield MD  [facility] Attending Psychiatry Brief Follow Up Note    Patient is being actively followed by psychology intern Mark Murdock for supportive psychotherapy.  Per team patient continues to refuse PT/getting out of bed despite impact on presurgical optimization, and continues with preoccupation with dilaudid.  I attempted to see patient myself twice today; at 8:50am patient was found sleeping, declined participating in interview despite encouragement and asked me to return around noon.  I returned at 1215 to see patient; patient in middle of dressing change which I was told could take up to an hour and patient's sister waiting to see her.  I will attempt to see the patient again tomorrow.    Millie Hatfield MD  [facility] Attending

## 2017-10-18 NOTE — PROGRESS NOTE ADULT - SUBJECTIVE AND OBJECTIVE BOX
ON: Compaining of pain. Requesting saline flushes.   10/17 : TPN ordered. Afebrile. Reinforced wound fistula set, OOB and walking. only give dilaudid if she walks. Vanco trough : 12.4        57 F s/p  SBR, fistula resection, esophagojejunostomy revision w/ post-op course complicated by RTOR for distal anastomosis breakdown, ATN, acute respiratory failure, & septic shock, since resolved.  NPO/TPN/IVF  Pain/nausea control - only benadryl at midnight and dilaudid during VAC change  ISS  L IJ  Nystatin powder, vancomycin  SCDs, lovenox, OOBA  Fistula pouch  Wounds: Midline xiphoid to pubis incision; DTI & stage 2 pressure ulcer noted. OVERNIGHT EVENTS:Complaining of pain. Requesting saline flushes.   10/17 : TPN ordered. Afebrile. Reinforced wound fistula set, OOB and walking. only give dilaudid if she walks. Vanco trough : 12.4    STATUS POST:      Surgeries:  7/24: SBR resection, fistula resection, revision of esophagogegunostomy drain through esophageal hiatus in R mediastinum  7/29: IR drainage of 800 cc of succus  7/29: Ex-lap, SB anastamosis in BP limb breakdown with succus throughout abdomen  8/1: abd washout, drainage of abscess, biologic mesh placement, closure of abdominal wall, vac and retention suture  8/7: abd washout, mesh removal, frozen abd noted, LLQ CHECO replaced with benson drain  8/10: leak noted from previous anastamosis site on L side, mid abdomen malecot drain placed in enterotomy, JPs x 2 placed, necrotic fascia debrided, vicryl mesh sutured to fascia circumferentially, xeroform over mesh then vac dressing placed	    SUBJECTIVE: Patient examined bedside. Patient requesting Dilaudid every hour and also saline flushes   Flatus: [X] YES [] NO             Bowel Movement: [X ] YES [ ] NO  Pain (0-10):            Pain Control Adequate: [X ] YES [ ] NO  Nausea: [ ] YES [ X] NO            Vomiting: [ ] YES [X ] NO  Diarrhea: [ ] YES [X ] NO         Constipation: [ ] YES [ X] NO     Chest Pain: [ ] YES [X ] NO    SOB:  [ ] YES [X ] NO    enoxaparin Injectable 30 milliGRAM(s) SubCutaneous two times a day  heparin  flush 100 Units/mL Injectable 100 Unit(s) IV Push every other day  losartan 50 milliGRAM(s) Oral daily  vancomycin  IVPB 1500 milliGRAM(s) IV Intermittent every 24 hours  vancomycin  IVPB          Vital Signs Last 24 Hrs  T(C): 36.4 (18 Oct 2017 06:04), Max: 37.1 (17 Oct 2017 17:10)  T(F): 97.5 (18 Oct 2017 06:04), Max: 98.8 (17 Oct 2017 17:10)  HR: 87 (18 Oct 2017 06:04) (73 - 89)  BP: 173/97 (18 Oct 2017 06:04) (127/86 - 173/97)  BP(mean): --  RR: 17 (18 Oct 2017 06:04) (16 - 17)  SpO2: 95% (18 Oct 2017 06:04) (93% - 98%)  I&O's Detail    17 Oct 2017 07:01  -  18 Oct 2017 07:00  --------------------------------------------------------  IN:    Fat Emulsion 20%: 187.2 mL    Oral Fluid: 400 mL    TPN (Total Parenteral Nutrition): 803 mL  Total IN: 1390.2 mL    OUT:    Drain: 25 mL    VAC (Vacuum Assisted Closure) System: 175 mL    Voided: 600 mL  Total OUT: 800 mL    Total NET: 590.2 mL      18 Oct 2017 07:01  -  18 Oct 2017 08:21  --------------------------------------------------------  IN:    Fat Emulsion 20%: 20.8 mL    TPN (Total Parenteral Nutrition): 73 mL  Total IN: 93.8 mL    OUT:  Total OUT: 0 mL    Total NET: 93.8 mL          General: NAD, resting comfortably in bed  C/V: NSR  Pulm: Nonlabored breathing, no respiratory distress  Abd: soft, NT/ND. Wound granulating   Extrem: WWP, no edema, SCDs in place      LABS:                        10.3   10.6  )-----------( 371      ( 18 Oct 2017 06:18 )             33.6     10-18    136  |  98  |  25<H>  ----------------------------<  79  4.5   |  26  |  0.54    Ca    10.2      18 Oct 2017 06:19  Phos  3.5     10-18  Mg     2.0     10-18            RADIOLOGY & ADDITIONAL STUDIES:

## 2017-10-19 LAB
ANION GAP SERPL CALC-SCNC: 14 MMOL/L — SIGNIFICANT CHANGE UP (ref 5–17)
BUN SERPL-MCNC: 28 MG/DL — HIGH (ref 7–23)
CALCIUM SERPL-MCNC: 9.6 MG/DL — SIGNIFICANT CHANGE UP (ref 8.4–10.5)
CHLORIDE SERPL-SCNC: 97 MMOL/L — SIGNIFICANT CHANGE UP (ref 96–108)
CO2 SERPL-SCNC: 26 MMOL/L — SIGNIFICANT CHANGE UP (ref 22–31)
CREAT SERPL-MCNC: 0.52 MG/DL — SIGNIFICANT CHANGE UP (ref 0.5–1.3)
GLUCOSE BLDC GLUCOMTR-MCNC: 117 MG/DL — HIGH (ref 70–99)
GLUCOSE BLDC GLUCOMTR-MCNC: 135 MG/DL — HIGH (ref 70–99)
GLUCOSE BLDC GLUCOMTR-MCNC: 140 MG/DL — HIGH (ref 70–99)
GLUCOSE BLDC GLUCOMTR-MCNC: 140 MG/DL — HIGH (ref 70–99)
GLUCOSE SERPL-MCNC: 152 MG/DL — HIGH (ref 70–99)
HCT VFR BLD CALC: 29.1 % — LOW (ref 34.5–45)
HGB BLD-MCNC: 9.1 G/DL — LOW (ref 11.5–15.5)
MAGNESIUM SERPL-MCNC: 1.8 MG/DL — SIGNIFICANT CHANGE UP (ref 1.6–2.6)
MCHC RBC-ENTMCNC: 28.6 PG — SIGNIFICANT CHANGE UP (ref 27–34)
MCHC RBC-ENTMCNC: 31.3 G/DL — LOW (ref 32–36)
MCV RBC AUTO: 91.5 FL — SIGNIFICANT CHANGE UP (ref 80–100)
PHOSPHATE SERPL-MCNC: 3.6 MG/DL — SIGNIFICANT CHANGE UP (ref 2.5–4.5)
PLATELET # BLD AUTO: 343 K/UL — SIGNIFICANT CHANGE UP (ref 150–400)
POTASSIUM SERPL-MCNC: 4.1 MMOL/L — SIGNIFICANT CHANGE UP (ref 3.5–5.3)
POTASSIUM SERPL-SCNC: 4.1 MMOL/L — SIGNIFICANT CHANGE UP (ref 3.5–5.3)
PROT SERPL-MCNC: 6.7 G/DL — SIGNIFICANT CHANGE UP (ref 6–8.3)
RBC # BLD: 3.18 M/UL — LOW (ref 3.8–5.2)
RBC # FLD: 17.7 % — HIGH (ref 10.3–16.9)
SODIUM SERPL-SCNC: 137 MMOL/L — SIGNIFICANT CHANGE UP (ref 135–145)
WBC # BLD: 7.6 K/UL — SIGNIFICANT CHANGE UP (ref 3.8–10.5)
WBC # FLD AUTO: 7.6 K/UL — SIGNIFICANT CHANGE UP (ref 3.8–10.5)

## 2017-10-19 PROCEDURE — 99233 SBSQ HOSP IP/OBS HIGH 50: CPT

## 2017-10-19 RX ORDER — DIAZEPAM 5 MG
5 TABLET ORAL AT BEDTIME
Qty: 0 | Refills: 0 | Status: DISCONTINUED | OUTPATIENT
Start: 2017-10-20 | End: 2017-10-26

## 2017-10-19 RX ORDER — HYDROMORPHONE HYDROCHLORIDE 2 MG/ML
0.25 INJECTION INTRAMUSCULAR; INTRAVENOUS; SUBCUTANEOUS DAILY
Qty: 0 | Refills: 0 | Status: DISCONTINUED | OUTPATIENT
Start: 2017-10-19 | End: 2017-10-26

## 2017-10-19 RX ORDER — I.V. FAT EMULSION 20 G/100ML
50 EMULSION INTRAVENOUS ONCE
Qty: 0 | Refills: 0 | Status: COMPLETED | OUTPATIENT
Start: 2017-10-19 | End: 2017-10-19

## 2017-10-19 RX ORDER — ELECTROLYTE SOLUTION,INJ
1 VIAL (ML) INTRAVENOUS
Qty: 0 | Refills: 0 | Status: DISCONTINUED | OUTPATIENT
Start: 2017-10-19 | End: 2017-10-19

## 2017-10-19 RX ORDER — VANCOMYCIN HCL 1 G
1750 VIAL (EA) INTRAVENOUS EVERY 24 HOURS
Qty: 0 | Refills: 0 | Status: DISCONTINUED | OUTPATIENT
Start: 2017-10-19 | End: 2017-10-22

## 2017-10-19 RX ORDER — KETOROLAC TROMETHAMINE 30 MG/ML
30 SYRINGE (ML) INJECTION ONCE
Qty: 0 | Refills: 0 | Status: DISCONTINUED | OUTPATIENT
Start: 2017-10-19 | End: 2017-10-19

## 2017-10-19 RX ADMIN — LOSARTAN POTASSIUM 50 MILLIGRAM(S): 100 TABLET, FILM COATED ORAL at 07:20

## 2017-10-19 RX ADMIN — ONDANSETRON 4 MILLIGRAM(S): 8 TABLET, FILM COATED ORAL at 17:34

## 2017-10-19 RX ADMIN — Medication 1 EACH: at 17:19

## 2017-10-19 RX ADMIN — Medication 100 UNIT(S): at 12:27

## 2017-10-19 RX ADMIN — Medication 30 MILLIGRAM(S): at 04:01

## 2017-10-19 RX ADMIN — PANTOPRAZOLE SODIUM 40 MILLIGRAM(S): 20 TABLET, DELAYED RELEASE ORAL at 07:20

## 2017-10-19 RX ADMIN — Medication 30 MILLIGRAM(S): at 04:20

## 2017-10-19 RX ADMIN — Medication 650 MILLIGRAM(S): at 11:05

## 2017-10-19 RX ADMIN — Medication 50 MILLIGRAM(S): at 21:35

## 2017-10-19 RX ADMIN — Medication 250 MILLIGRAM(S): at 17:05

## 2017-10-19 RX ADMIN — Medication 10 MILLIGRAM(S): at 17:06

## 2017-10-19 RX ADMIN — NYSTATIN CREAM 1 APPLICATION(S): 100000 CREAM TOPICAL at 17:19

## 2017-10-19 RX ADMIN — HYDROMORPHONE HYDROCHLORIDE 0.25 MILLIGRAM(S): 2 INJECTION INTRAMUSCULAR; INTRAVENOUS; SUBCUTANEOUS at 17:19

## 2017-10-19 RX ADMIN — ENOXAPARIN SODIUM 30 MILLIGRAM(S): 100 INJECTION SUBCUTANEOUS at 17:20

## 2017-10-19 RX ADMIN — Medication 5 MILLIGRAM(S): at 21:36

## 2017-10-19 RX ADMIN — ESCITALOPRAM OXALATE 20 MILLIGRAM(S): 10 TABLET, FILM COATED ORAL at 21:36

## 2017-10-19 RX ADMIN — HYDROMORPHONE HYDROCHLORIDE 0.25 MILLIGRAM(S): 2 INJECTION INTRAMUSCULAR; INTRAVENOUS; SUBCUTANEOUS at 17:04

## 2017-10-19 RX ADMIN — Medication 650 MILLIGRAM(S): at 21:36

## 2017-10-19 RX ADMIN — Medication 650 MILLIGRAM(S): at 22:00

## 2017-10-19 RX ADMIN — Medication 650 MILLIGRAM(S): at 12:05

## 2017-10-19 RX ADMIN — I.V. FAT EMULSION 31.25 GRAM(S): 20 EMULSION INTRAVENOUS at 22:00

## 2017-10-19 RX ADMIN — ENOXAPARIN SODIUM 30 MILLIGRAM(S): 100 INJECTION SUBCUTANEOUS at 07:19

## 2017-10-19 RX ADMIN — Medication 10 MILLIGRAM(S): at 07:20

## 2017-10-19 NOTE — PROGRESS NOTE ADULT - SUBJECTIVE AND OBJECTIVE BOX
On interval followup, the left subclavian venous catheter site is clean, dry, non-inflamed and non-tender. Afebrile. Notes, cultures and progress are followed.

## 2017-10-19 NOTE — PROGRESS NOTE ADULT - SUBJECTIVE AND OBJECTIVE BOX
O/N: BRAYDEN, VSS, wound vac changed  10/18: Wounvac leaking fistula manager placed     STATUS POST:  7/24: SBR resection, fistula resection, revision of esophagogegunostomy drain through esophageal hiatus in R mediastinum  7/29: IR drainage of 800 cc of succus  7/29: Ex-lap, SB anastamosis in BP limb breakdown with succus throughout abdomen  8/1: abd washout, drainage of abscess, biologic mesh placement, closure of abdominal wall, vac and retention suture  8/7: abd washout, mesh removal, frozen abd noted, LLQ CHECO replaced with benson drain  8/10: leak noted from previous anastamosis site on L side, mid abdomen malecot drain placed in enterotomy, JPs x 2 placed, necrotic fascia debrided, vicryl mesh sutured to fascia circumferentially, xeroform over mesh then vac dressing placed O/N: BRAYDEN, VSS, wound vac changed  10/18: Wounvac leaking, replaced.     STATUS POST:  7/24: SBR resection, fistula resection, revision of esophagogegunostomy drain through esophageal hiatus in R mediastinum  7/29: IR drainage of 800 cc of succus  7/29: Ex-lap, SB anastamosis in BP limb breakdown with succus throughout abdomen  8/1: abd washout, drainage of abscess, biologic mesh placement, closure of abdominal wall, vac and retention suture  8/7: abd washout, mesh removal, frozen abd noted, LLQ CHECO replaced with benson drain  8/10: leak noted from previous anastamosis site on L side, mid abdomen malecot drain placed in enterotomy, JPs x 2 placed, necrotic fascia debrided, vicryl mesh sutured to fascia circumferentially, xeroform over mesh then vac dressing placed    SUBJECTIVE: Patient seen and examined bedside by chief resident. No nausea/vomiting, OOB and no fever. no abd pain.    enoxaparin Injectable 30 milliGRAM(s) SubCutaneous two times a day  heparin  flush 100 Units/mL Injectable 100 Unit(s) IV Push every other day  losartan 50 milliGRAM(s) Oral daily  vancomycin  IVPB 1500 milliGRAM(s) IV Intermittent every 24 hours  vancomycin  IVPB          Vital Signs Last 24 Hrs  T(C): 36.9 (19 Oct 2017 05:55), Max: 37 (18 Oct 2017 20:10)  T(F): 98.5 (19 Oct 2017 05:55), Max: 98.6 (18 Oct 2017 20:10)  HR: 70 (19 Oct 2017 05:55) (70 - 76)  BP: 167/75 (19 Oct 2017 05:55) (145/90 - 167/75)  BP(mean): --  RR: 17 (19 Oct 2017 05:55) (16 - 18)  SpO2: 93% (19 Oct 2017 05:55) (93% - 95%)  I&O's Detail    18 Oct 2017 07:01  -  19 Oct 2017 07:00  --------------------------------------------------------  IN:    Fat Emulsion 20%: 124.8 mL    TPN (Total Parenteral Nutrition): 584 mL  Total IN: 708.8 mL    OUT:    VAC (Vacuum Assisted Closure) System: 350 mL  Total OUT: 350 mL    Total NET: 358.8 mL          General: NAD, resting comfortably in bed  C/V: NSR  Pulm: Nonlabored breathing, no respiratory distress  Abd: soft, NT/ND. wound VAC intact, no leak. Wound : [id number], 3 fistulas.  Extrem: WWP, no edema, SCDs in place        LABS:                        9.1    7.6   )-----------( 343      ( 19 Oct 2017 06:47 )             29.1     10-19    137  |  97  |  28<H>  ----------------------------<  152<H>  4.1   |  26  |  0.52    Ca    9.6      19 Oct 2017 06:47  Phos  3.6     10-19  Mg     1.8     10-19            RADIOLOGY & ADDITIONAL STUDIES:

## 2017-10-19 NOTE — PROGRESS NOTE BEHAVIORAL HEALTH - SUMMARY
57F reported history of anxiety on Lexapro, no prior psych history, PMH gastric bypass surgery with multiple complications, currently admitted for extended period of time due to fistulas, reported SI while frustrated on 10/5 evening, has since consistently denied.      Per primary team and chart patient has been refusing PT and requesting IV pain meds that do not appear justified.  There is a clear disconnect between team's report and patient's presentation; she has been intermittently uncooperative with psychiatric/psychotherapeutic intervention (refusing sessions due to fatigue or pain) but when she does engage she appears unconcerned with medical situation and cheerfully denies reports of lack of cooperation.    Would continue psychiatric medications as currently written given patient denies active psychiatric symptoms.  Psychiatry will continue to follow 2-3 times weekly for support. 57F reported history of anxiety on Lexapro, no prior psych history, PMH gastric bypass surgery with multiple complications, currently admitted for extended period of time due to fistulas, reported SI while frustrated on 10/5 evening, has since consistently denied.      Per primary team and chart patient has been refusing PT and requesting IV pain meds that do not appear justified.  There is a clear disconnect between team's report and patient's presentation; she has been intermittently uncooperative with psychiatric/psychotherapeutic intervention (refusing sessions due to fatigue or pain) but when she does engage she appears unconcerned with medical situation and cheerfully denies reports of lack of cooperation.  Patient does seem to have engaged with PT for last 3 days.    Would continue psychiatric medications as currently written given patient denies active psychiatric symptoms.  Psychiatry will continue to follow 2-3 times weekly for support.

## 2017-10-19 NOTE — PROGRESS NOTE ADULT - ASSESSMENT
57 F s/p  SBR, fistula resection, esophagojejunostomy revision w/ post-op course complicated by RTOR for distal anastomosis breakdown, ATN, acute respiratory failure, & septic shock, since resolved.  NPO/TPN/IVF  Pain/nausea control - only benadryl at midnight and dilaudid during VAC change  ISS  L IJ  Nystatin powder, vancomycin  SCDs, lovenox, OOBA  Wound vac in place  Wounds: Midline xiphoid to pubis incision; DTI & stage 2 pressure ulcer noted. 57 F s/p  SBR, fistula resection, esophagojejunostomy revision w/ post-op course complicated by RTOR for distal anastomosis breakdown, ATN, acute respiratory failure, & septic shock, since resolved.      NPO/TPN/IVF except sips and ice chips  Pain/nausea control - only benadryl at midnight and dilaudid during VAC change  ISS/OOB  Daily PT session  Nystatin powder, vancomycin (10/11-11/8)  SCDs, lovenox, OOBA  Wound vac in place  Wounds: Midline xiphoid to pubis incision; DTI & stage 2 pressure ulcer noted.

## 2017-10-19 NOTE — CHART NOTE - NSCHARTNOTEFT_GEN_A_CORE
Admitting Diagnosis:   57 F s/p  SBR, fistula resection, esophagojejunostomy revision w/ post-op course complicated by RTOR for distal anastomosis breakdown, ATN, acute respiratory failure, & septic shock, since resolved.        PAST MEDICAL & SURGICAL HISTORY:  Reflux  Obesity  DVT (deep venous thrombosis): 2013 neck  Diverticulitis  Hypertension  Elective surgery: abodominal wall surgery  dec 2016  Elective surgery: NOV 2016 gastric bypass revision  Gastric bypass status for obesity: gastric sleeve, 6/2012  S/P breast biopsy: 2011, left  S/P colon resection: 2011  S/P knee surgery: repair 2009, 2011  right; left  Umbilical hernia: 2000  S/P appendectomy: 1975  S/P tonsillectomy: 1967      Current Nutrition Order: NPO with Ice Chips/Sips of Water   PN via PICC line: 73ml/hr; 110g AA, 312g Dex, 50g 20% lipids (2000.8kcal), and 1750ml fluid              PO Intake: Good (%) [   ]  Fair (50-75%) [   ] Poor (<25%) [   ]- N/A    GI Issues: w/ no c/o GI stress per RN    Pain: managed w/ pain meds    Skin Integrity: Midline xiphoid to pubis incision; unstageable sacrum and stage 2 L buttoock pressure ulcer    Labs:   10-19    137  |  97  |  28<H>  ----------------------------<  152<H>  4.1   |  26  |  0.52    Ca    9.6      19 Oct 2017 06:47  Phos  3.6     10-19  Mg     1.8     10-19    TPro  6.7  /  Alb  x   /  TBili  x   /  DBili  x   /  AST  x   /  ALT  x   /  AlkPhos  x   10-19    CAPILLARY BLOOD GLUCOSE      POCT Blood Glucose.: 140 mg/dL (19 Oct 2017 11:19)  POCT Blood Glucose.: 140 mg/dL (19 Oct 2017 05:43)  POCT Blood Glucose.: 135 mg/dL (19 Oct 2017 00:06)  POCT Blood Glucose.: 159 mg/dL (18 Oct 2017 17:08)      Medications:  MEDICATIONS  (STANDING):  calcitriol Injectable 1 MICROGram(s) IV Push <User Schedule>  collagenase Ointment 1 Application(s) Topical daily  cyanocobalamin Injectable 1000 MICROGram(s) SubCutaneous every 7 days  dextrose 5%. 1000 milliLiter(s) (50 mL/Hr) IV Continuous <Continuous>  dextrose 50% Injectable 25 Gram(s) IV Push once  dextrose 50% Injectable 12.5 Gram(s) IV Push once  diazepam    Tablet 5 milliGRAM(s) Oral at bedtime  diphenhydrAMINE   Injectable 50 milliGRAM(s) IV Push at bedtime  enoxaparin Injectable 30 milliGRAM(s) SubCutaneous two times a day  escitalopram 20 milliGRAM(s) Oral daily  fat emulsion 20% IVPB 50 Gram(s) IV Intermittent once  heparin  flush 100 Units/mL Injectable 100 Unit(s) IV Push every other day  insulin lispro (HumaLOG) corrective regimen sliding scale   SubCutaneous every 6 hours  losartan 50 milliGRAM(s) Oral daily  nystatin Powder 1 Application(s) Topical two times a day  oxybutynin 10 milliGRAM(s) Oral two times a day  pantoprazole  Injectable 40 milliGRAM(s) IV Push daily  Parenteral Nutrition - Adult 1 Each (73 mL/Hr) TPN Continuous <Continuous>  Parenteral Nutrition - Adult 1 Each (73 mL/Hr) TPN Continuous <Continuous>  sodium chloride 0.9% lock flush 20 milliLiter(s) IV Push once  vancomycin  IVPB 1750 milliGRAM(s) IV Intermittent every 24 hours    MEDICATIONS  (PRN):  acetaminophen   Tablet. 650 milliGRAM(s) Oral every 6 hours PRN Moderate Pain (4 - 6)  ondansetron Injectable 4 milliGRAM(s) IV Push every 6 hours PRN Nausea and/or Vomiting  sodium chloride 0.9% lock flush 10 milliLiter(s) IV Push every 1 hour PRN After each medication administration  sodium chloride 0.9% lock flush 10 milliLiter(s) IV Push every 12 hours PRN Lumen of catheter NOT used      Weight: 10/17: 74.6kg  Daily     Daily     Weight Change: 1.1kg wt loss since daily wt on 7/21: 75.7kg,   Estimated energy needs: %of IBW; BMI 29.1; Needs per IBW as pt > 120% of IBW; increased needs per wound and pressure ulcer healing    30-35 kcal/kg (4171-1369 kcal).   1.8-2 g/kg ( g protein).  30-35 mL/kg (8508-9227 mL fluid).     Subjective: pt was pre-occupied w/ PT; information gathered from RN, pt w/ no GI stress; pain is managed w/ pain meds; Wound vac replacement on 10/18 . Pt is meeting needs from PN ordered as 110g AA, 312g Dex, 50g 20% lipids -->2000kcal (38.2kcal/kg IBW), 2.1g pro/kg IBW, GIR 2.19)    Previous Nutrition Diagnosis: Increased nutrient needs RT increased demand for nutrients AEB wound and pressure ulcer healing   Active [  X]  Resolved [   ]    If resolved, new PES:     Goal: pt to meet % EER from tolerated route    Recommendations: -Continue to check labs: triglycerides, BG & FS   -monitor wt, update weekly  -monitor pt clinical status to change diet order to use gut as medically feasible    Education: n/s-pt unavailable    Risk Level: High [  X] Moderate [   ] Low [   ]

## 2017-10-20 LAB
ALBUMIN SERPL ELPH-MCNC: 2.5 G/DL — LOW (ref 3.3–5)
ANION GAP SERPL CALC-SCNC: 13 MMOL/L — SIGNIFICANT CHANGE UP (ref 5–17)
BUN SERPL-MCNC: 23 MG/DL — SIGNIFICANT CHANGE UP (ref 7–23)
CALCIUM SERPL-MCNC: 9.6 MG/DL — SIGNIFICANT CHANGE UP (ref 8.4–10.5)
CHLORIDE SERPL-SCNC: 97 MMOL/L — SIGNIFICANT CHANGE UP (ref 96–108)
CO2 SERPL-SCNC: 26 MMOL/L — SIGNIFICANT CHANGE UP (ref 22–31)
CREAT SERPL-MCNC: 0.54 MG/DL — SIGNIFICANT CHANGE UP (ref 0.5–1.3)
GLUCOSE BLDC GLUCOMTR-MCNC: 127 MG/DL — HIGH (ref 70–99)
GLUCOSE BLDC GLUCOMTR-MCNC: 128 MG/DL — HIGH (ref 70–99)
GLUCOSE BLDC GLUCOMTR-MCNC: 130 MG/DL — HIGH (ref 70–99)
GLUCOSE BLDC GLUCOMTR-MCNC: 140 MG/DL — HIGH (ref 70–99)
GLUCOSE BLDC GLUCOMTR-MCNC: 74 MG/DL — SIGNIFICANT CHANGE UP (ref 70–99)
GLUCOSE SERPL-MCNC: 140 MG/DL — HIGH (ref 70–99)
HCT VFR BLD CALC: 30.3 % — LOW (ref 34.5–45)
HGB BLD-MCNC: 9.4 G/DL — LOW (ref 11.5–15.5)
MAGNESIUM SERPL-MCNC: 1.9 MG/DL — SIGNIFICANT CHANGE UP (ref 1.6–2.6)
MCHC RBC-ENTMCNC: 28.7 PG — SIGNIFICANT CHANGE UP (ref 27–34)
MCHC RBC-ENTMCNC: 31 G/DL — LOW (ref 32–36)
MCV RBC AUTO: 92.7 FL — SIGNIFICANT CHANGE UP (ref 80–100)
PHOSPHATE SERPL-MCNC: 3.5 MG/DL — SIGNIFICANT CHANGE UP (ref 2.5–4.5)
PLATELET # BLD AUTO: 328 K/UL — SIGNIFICANT CHANGE UP (ref 150–400)
POTASSIUM SERPL-MCNC: 4.1 MMOL/L — SIGNIFICANT CHANGE UP (ref 3.5–5.3)
POTASSIUM SERPL-SCNC: 4.1 MMOL/L — SIGNIFICANT CHANGE UP (ref 3.5–5.3)
PREALB SERPL-MCNC: 27 MG/DL — SIGNIFICANT CHANGE UP (ref 20–40)
RBC # BLD: 3.27 M/UL — LOW (ref 3.8–5.2)
RBC # FLD: 17.9 % — HIGH (ref 10.3–16.9)
SODIUM SERPL-SCNC: 136 MMOL/L — SIGNIFICANT CHANGE UP (ref 135–145)
VANCOMYCIN TROUGH SERPL-MCNC: 15.3 UG/ML — SIGNIFICANT CHANGE UP (ref 10–20)
WBC # BLD: 6.7 K/UL — SIGNIFICANT CHANGE UP (ref 3.8–10.5)
WBC # FLD AUTO: 6.7 K/UL — SIGNIFICANT CHANGE UP (ref 3.8–10.5)

## 2017-10-20 RX ORDER — ELECTROLYTE SOLUTION,INJ
1 VIAL (ML) INTRAVENOUS
Qty: 0 | Refills: 0 | Status: DISCONTINUED | OUTPATIENT
Start: 2017-10-20 | End: 2017-10-20

## 2017-10-20 RX ORDER — ONDANSETRON 8 MG/1
4 TABLET, FILM COATED ORAL ONCE
Qty: 0 | Refills: 0 | Status: COMPLETED | OUTPATIENT
Start: 2017-10-20 | End: 2017-10-20

## 2017-10-20 RX ORDER — I.V. FAT EMULSION 20 G/100ML
50 EMULSION INTRAVENOUS
Qty: 0 | Refills: 0 | Status: DISCONTINUED | OUTPATIENT
Start: 2017-10-20 | End: 2017-10-20

## 2017-10-20 RX ORDER — HYDROMORPHONE HYDROCHLORIDE 2 MG/ML
0.25 INJECTION INTRAMUSCULAR; INTRAVENOUS; SUBCUTANEOUS ONCE
Qty: 0 | Refills: 0 | Status: DISCONTINUED | OUTPATIENT
Start: 2017-10-20 | End: 2017-10-20

## 2017-10-20 RX ADMIN — NYSTATIN CREAM 1 APPLICATION(S): 100000 CREAM TOPICAL at 06:29

## 2017-10-20 RX ADMIN — ONDANSETRON 4 MILLIGRAM(S): 8 TABLET, FILM COATED ORAL at 12:14

## 2017-10-20 RX ADMIN — Medication 1 APPLICATION(S): at 06:28

## 2017-10-20 RX ADMIN — HYDROMORPHONE HYDROCHLORIDE 0.25 MILLIGRAM(S): 2 INJECTION INTRAMUSCULAR; INTRAVENOUS; SUBCUTANEOUS at 13:00

## 2017-10-20 RX ADMIN — Medication 250 MILLIGRAM(S): at 18:38

## 2017-10-20 RX ADMIN — Medication 50 MILLIGRAM(S): at 21:04

## 2017-10-20 RX ADMIN — Medication 650 MILLIGRAM(S): at 06:30

## 2017-10-20 RX ADMIN — LOSARTAN POTASSIUM 50 MILLIGRAM(S): 100 TABLET, FILM COATED ORAL at 06:30

## 2017-10-20 RX ADMIN — HYDROMORPHONE HYDROCHLORIDE 0.25 MILLIGRAM(S): 2 INJECTION INTRAMUSCULAR; INTRAVENOUS; SUBCUTANEOUS at 12:14

## 2017-10-20 RX ADMIN — Medication 5 MILLIGRAM(S): at 21:04

## 2017-10-20 RX ADMIN — Medication 650 MILLIGRAM(S): at 21:04

## 2017-10-20 RX ADMIN — ONDANSETRON 4 MILLIGRAM(S): 8 TABLET, FILM COATED ORAL at 01:40

## 2017-10-20 RX ADMIN — ENOXAPARIN SODIUM 30 MILLIGRAM(S): 100 INJECTION SUBCUTANEOUS at 18:38

## 2017-10-20 RX ADMIN — NYSTATIN CREAM 1 APPLICATION(S): 100000 CREAM TOPICAL at 18:48

## 2017-10-20 RX ADMIN — Medication 10 MILLIGRAM(S): at 18:38

## 2017-10-20 RX ADMIN — ESCITALOPRAM OXALATE 20 MILLIGRAM(S): 10 TABLET, FILM COATED ORAL at 21:04

## 2017-10-20 RX ADMIN — PANTOPRAZOLE SODIUM 40 MILLIGRAM(S): 20 TABLET, DELAYED RELEASE ORAL at 06:30

## 2017-10-20 RX ADMIN — ONDANSETRON 4 MILLIGRAM(S): 8 TABLET, FILM COATED ORAL at 21:22

## 2017-10-20 RX ADMIN — Medication 650 MILLIGRAM(S): at 22:00

## 2017-10-20 RX ADMIN — Medication 10 MILLIGRAM(S): at 06:31

## 2017-10-20 RX ADMIN — I.V. FAT EMULSION 20.83 GRAM(S): 20 EMULSION INTRAVENOUS at 21:04

## 2017-10-20 RX ADMIN — Medication 1 EACH: at 18:37

## 2017-10-20 RX ADMIN — ENOXAPARIN SODIUM 30 MILLIGRAM(S): 100 INJECTION SUBCUTANEOUS at 06:31

## 2017-10-20 RX ADMIN — CALCITRIOL 1 MICROGRAM(S): 0.5 CAPSULE ORAL at 12:13

## 2017-10-20 RX ADMIN — HYDROMORPHONE HYDROCHLORIDE 0.25 MILLIGRAM(S): 2 INJECTION INTRAMUSCULAR; INTRAVENOUS; SUBCUTANEOUS at 11:00

## 2017-10-20 RX ADMIN — HYDROMORPHONE HYDROCHLORIDE 0.25 MILLIGRAM(S): 2 INJECTION INTRAMUSCULAR; INTRAVENOUS; SUBCUTANEOUS at 10:43

## 2017-10-20 NOTE — OCCUPATIONAL THERAPY INITIAL EVALUATION ADULT - GENERAL OBSERVATIONS, REHAB EVAL
Right hand dominant. Chart reviewed, patient cleared for OT eval by ROM Kumar. Received supine, NAD, +PICC, +ostomy bad, +abdominal dressing (leaking, RN notified).

## 2017-10-20 NOTE — PROGRESS NOTE ADULT - SUBJECTIVE AND OBJECTIVE BOX
O/N: BRAYDEN  10/19 : TPN ordered, BRAYDEN, increase vanco dose to 1750mg QD (as per rudy) O/N: BRAYDEN  10/19 : TPN ordered, BRAYDEN, increase vanco dose to 1750mg QD (as per masgraciek)    STATUS POST:    7/24: SBR resection, fistula resection, revision of esophagogegunostomy drain through esophageal hiatus in R mediastinum  7/29: IR drainage of 800 cc of succus  7/29: Ex-lap, SB anastamosis in BP limb breakdown with succus throughout abdomen  8/1: abd washout, drainage of abscess, biologic mesh placement, closure of abdominal wall, vac and retention suture  8/7: abd washout, mesh removal, frozen abd noted, LLQ CHECO replaced with benson drain  8/10: leak noted from previous anastamosis site on L side, mid abdomen malecot drain placed in enterotomy, JPs x 2 placed, necrotic fascia debrided, vicryl mesh sutured to fascia circumferentially, xeroform over mesh then vac dressing placed    SUBJECTIVE: Patient seen and examined bedside by chief resident. No complains, VAC intact.     enoxaparin Injectable 30 milliGRAM(s) SubCutaneous two times a day  heparin  flush 100 Units/mL Injectable 100 Unit(s) IV Push every other day  losartan 50 milliGRAM(s) Oral daily  vancomycin  IVPB 1750 milliGRAM(s) IV Intermittent every 24 hours      Vital Signs Last 24 Hrs  T(C): 37 (20 Oct 2017 05:51), Max: 37 (20 Oct 2017 05:51)  T(F): 98.6 (20 Oct 2017 05:51), Max: 98.6 (20 Oct 2017 05:51)  HR: 81 (20 Oct 2017 05:51) (65 - 83)  BP: 152/93 (20 Oct 2017 05:51) (135/92 - 158/99)  BP(mean): --  RR: 17 (20 Oct 2017 05:51) (16 - 18)  SpO2: 96% (20 Oct 2017 05:51) (94% - 98%)  I&O's Detail    19 Oct 2017 07:01  -  20 Oct 2017 07:00  --------------------------------------------------------  IN:    Fat Emulsion 20%: 62.4 mL    TPN (Total Parenteral Nutrition): 949 mL  Total IN: 1011.4 mL    OUT:    Voided: 1350 mL  Total OUT: 1350 mL    Total NET: -338.6 mL          General: NAD, resting comfortably in bed  C/V: NSR  Pulm: Nonlabored breathing, no respiratory distress  Abd: soft, NT/ND. VAC intact  Extrem: WWP, no edema, SCDs in place        LABS:                        9.1    7.6   )-----------( 343      ( 19 Oct 2017 06:47 )             29.1     10-19    137  |  97  |  28<H>  ----------------------------<  152<H>  4.1   |  26  |  0.52    Ca    9.6      19 Oct 2017 06:47  Phos  3.6     10-19  Mg     1.8     10-19    TPro  6.7  /  Alb  x   /  TBili  x   /  DBili  x   /  AST  x   /  ALT  x   /  AlkPhos  x   10-19          RADIOLOGY & ADDITIONAL STUDIES:

## 2017-10-20 NOTE — OCCUPATIONAL THERAPY INITIAL EVALUATION ADULT - RANGE OF MOTION EXAMINATION, UPPER EXTREMITY
bilateral UE Active ROM was WFL  (within functional limits)/impaired L shoulder flexion (approximately 120 degrees)

## 2017-10-20 NOTE — PROGRESS NOTE ADULT - ASSESSMENT
57 F s/p  SBR, fistula resection, esophagojejunostomy revision w/ post-op course complicated by RTOR for distal anastomosis breakdown, ATN, acute respiratory failure, & septic shock, since resolved.  NPO/TPN/IVF  Pain/nausea control - only benadryl at midnight and dilaudid during VAC change  ISS  L IJ  Nystatin powder, vancomycin  SCDs, lovenox, OOBA  Wound vac in place  Wounds: Midline xiphoid to pubis incision; DTI & stage 2 pressure ulcer noted. 57 F s/p  SBR, fistula resection, esophagojejunostomy revision w/ post-op course complicated by RTOR for distal anastomosis breakdown, ATN, acute respiratory failure, & septic shock, since resolved.    Plan :  NPO/TPN/IVF  Pain/nausea control - only benadryl at midnight and dilaudid during VAC change and PT session  ISS/OOB  Nystatin powder, vancomycin  SCDs, lovenox, OOBA  Wound vac in place - change today 57 F s/p  SBR, fistula resection, esophagojejunostomy revision w/ post-op course complicated by RTOR for distal anastomosis breakdown, ATN, acute respiratory failure, & septic shock, since resolved, complicated by MRSA bacteremia, on vancomycin and resolved.     Plan :  NPO/TPN/IVF  Pain/nausea control - only benadryl at midnight and dilaudid during VAC change and PT session  ISS/OOB  Nystatin powder, vancomycin  SCDs, lovenox, OOBA  Wound vac in place - change today

## 2017-10-20 NOTE — OCCUPATIONAL THERAPY INITIAL EVALUATION ADULT - PLANNED THERAPY INTERVENTIONS, OT EVAL
bed mobility training/strengthening/IADL retraining/ADL retraining/transfer training/balance training

## 2017-10-20 NOTE — OCCUPATIONAL THERAPY INITIAL EVALUATION ADULT - PERTINENT HX OF CURRENT PROBLEM, REHAB EVAL
56F w/PMHx of HTN, gastric bypass in 2002 c/b stricture, corkscrewed sleeve and chronic leak, revised on 11/28/16 with protracted (70-day) postoperative course complicated by MDR infections, a C-diff infection, respiratory compromise due to plugging/PNA/effusions requiring multiple interventions and a trach, as well as a continued leak requiring a draining double-pigtail with stenting performed by GI. 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 Per chart, 57 F s/p  SBR, fistula resection, esophagojejunostomy revision w/ post-op course complicated by RTOR for distal anastomosis breakdown, ATN, acute respiratory failure, & septic shock, MRSA bacteremia which resolved.

## 2017-10-20 NOTE — OCCUPATIONAL THERAPY INITIAL EVALUATION ADULT - STANDING BALANCE: DYNAMIC, REHAB EVAL
poor plus/patient took approximately 5 side steps to head of bed with min Ax2 and RW, limited 2/2 leaking abdominal wound

## 2017-10-21 LAB
ANION GAP SERPL CALC-SCNC: 12 MMOL/L — SIGNIFICANT CHANGE UP (ref 5–17)
BUN SERPL-MCNC: 28 MG/DL — HIGH (ref 7–23)
CALCIUM SERPL-MCNC: 9.7 MG/DL — SIGNIFICANT CHANGE UP (ref 8.4–10.5)
CHLORIDE SERPL-SCNC: 99 MMOL/L — SIGNIFICANT CHANGE UP (ref 96–108)
CO2 SERPL-SCNC: 28 MMOL/L — SIGNIFICANT CHANGE UP (ref 22–31)
CREAT SERPL-MCNC: 0.58 MG/DL — SIGNIFICANT CHANGE UP (ref 0.5–1.3)
GLUCOSE BLDC GLUCOMTR-MCNC: 122 MG/DL — HIGH (ref 70–99)
GLUCOSE BLDC GLUCOMTR-MCNC: 138 MG/DL — HIGH (ref 70–99)
GLUCOSE BLDC GLUCOMTR-MCNC: 141 MG/DL — HIGH (ref 70–99)
GLUCOSE SERPL-MCNC: 132 MG/DL — HIGH (ref 70–99)
HCT VFR BLD CALC: 31.1 % — LOW (ref 34.5–45)
HGB BLD-MCNC: 9.6 G/DL — LOW (ref 11.5–15.5)
MAGNESIUM SERPL-MCNC: 2 MG/DL — SIGNIFICANT CHANGE UP (ref 1.6–2.6)
MCHC RBC-ENTMCNC: 28.6 PG — SIGNIFICANT CHANGE UP (ref 27–34)
MCHC RBC-ENTMCNC: 30.9 G/DL — LOW (ref 32–36)
MCV RBC AUTO: 92.6 FL — SIGNIFICANT CHANGE UP (ref 80–100)
PHOSPHATE SERPL-MCNC: 3.2 MG/DL — SIGNIFICANT CHANGE UP (ref 2.5–4.5)
PLATELET # BLD AUTO: 352 K/UL — SIGNIFICANT CHANGE UP (ref 150–400)
POTASSIUM SERPL-MCNC: 4.4 MMOL/L — SIGNIFICANT CHANGE UP (ref 3.5–5.3)
POTASSIUM SERPL-SCNC: 4.4 MMOL/L — SIGNIFICANT CHANGE UP (ref 3.5–5.3)
RBC # BLD: 3.36 M/UL — LOW (ref 3.8–5.2)
RBC # FLD: 17.9 % — HIGH (ref 10.3–16.9)
SODIUM SERPL-SCNC: 139 MMOL/L — SIGNIFICANT CHANGE UP (ref 135–145)
TRIGL SERPL-MCNC: 179 MG/DL — HIGH (ref 10–149)
WBC # BLD: 7.9 K/UL — SIGNIFICANT CHANGE UP (ref 3.8–10.5)
WBC # FLD AUTO: 7.9 K/UL — SIGNIFICANT CHANGE UP (ref 3.8–10.5)

## 2017-10-21 RX ORDER — HYDROMORPHONE HYDROCHLORIDE 2 MG/ML
0.25 INJECTION INTRAMUSCULAR; INTRAVENOUS; SUBCUTANEOUS ONCE
Qty: 0 | Refills: 0 | Status: DISCONTINUED | OUTPATIENT
Start: 2017-10-21 | End: 2017-10-21

## 2017-10-21 RX ORDER — I.V. FAT EMULSION 20 G/100ML
50 EMULSION INTRAVENOUS ONCE
Qty: 0 | Refills: 0 | Status: COMPLETED | OUTPATIENT
Start: 2017-10-21 | End: 2017-10-21

## 2017-10-21 RX ORDER — ELECTROLYTE SOLUTION,INJ
1 VIAL (ML) INTRAVENOUS
Qty: 0 | Refills: 0 | Status: DISCONTINUED | OUTPATIENT
Start: 2017-10-21 | End: 2017-10-22

## 2017-10-21 RX ADMIN — ESCITALOPRAM OXALATE 20 MILLIGRAM(S): 10 TABLET, FILM COATED ORAL at 22:17

## 2017-10-21 RX ADMIN — Medication 650 MILLIGRAM(S): at 10:39

## 2017-10-21 RX ADMIN — HYDROMORPHONE HYDROCHLORIDE 0.25 MILLIGRAM(S): 2 INJECTION INTRAMUSCULAR; INTRAVENOUS; SUBCUTANEOUS at 13:46

## 2017-10-21 RX ADMIN — Medication 650 MILLIGRAM(S): at 10:09

## 2017-10-21 RX ADMIN — Medication 100 UNIT(S): at 12:03

## 2017-10-21 RX ADMIN — Medication 1 EACH: at 18:11

## 2017-10-21 RX ADMIN — ONDANSETRON 4 MILLIGRAM(S): 8 TABLET, FILM COATED ORAL at 16:43

## 2017-10-21 RX ADMIN — PANTOPRAZOLE SODIUM 40 MILLIGRAM(S): 20 TABLET, DELAYED RELEASE ORAL at 06:09

## 2017-10-21 RX ADMIN — ENOXAPARIN SODIUM 30 MILLIGRAM(S): 100 INJECTION SUBCUTANEOUS at 18:11

## 2017-10-21 RX ADMIN — Medication 10 MILLIGRAM(S): at 18:11

## 2017-10-21 RX ADMIN — ONDANSETRON 4 MILLIGRAM(S): 8 TABLET, FILM COATED ORAL at 10:17

## 2017-10-21 RX ADMIN — ONDANSETRON 4 MILLIGRAM(S): 8 TABLET, FILM COATED ORAL at 23:57

## 2017-10-21 RX ADMIN — ENOXAPARIN SODIUM 30 MILLIGRAM(S): 100 INJECTION SUBCUTANEOUS at 06:10

## 2017-10-21 RX ADMIN — ONDANSETRON 4 MILLIGRAM(S): 8 TABLET, FILM COATED ORAL at 03:50

## 2017-10-21 RX ADMIN — Medication 650 MILLIGRAM(S): at 03:53

## 2017-10-21 RX ADMIN — Medication 650 MILLIGRAM(S): at 16:43

## 2017-10-21 RX ADMIN — LOSARTAN POTASSIUM 50 MILLIGRAM(S): 100 TABLET, FILM COATED ORAL at 06:10

## 2017-10-21 RX ADMIN — Medication 10 MILLIGRAM(S): at 06:09

## 2017-10-21 RX ADMIN — Medication 250 MILLIGRAM(S): at 18:11

## 2017-10-21 RX ADMIN — NYSTATIN CREAM 1 APPLICATION(S): 100000 CREAM TOPICAL at 06:09

## 2017-10-21 RX ADMIN — Medication 650 MILLIGRAM(S): at 05:00

## 2017-10-21 RX ADMIN — NYSTATIN CREAM 1 APPLICATION(S): 100000 CREAM TOPICAL at 18:12

## 2017-10-21 RX ADMIN — Medication 50 MILLIGRAM(S): at 22:17

## 2017-10-21 RX ADMIN — Medication 650 MILLIGRAM(S): at 17:13

## 2017-10-21 RX ADMIN — Medication 5 MILLIGRAM(S): at 22:17

## 2017-10-21 RX ADMIN — I.V. FAT EMULSION 20.83 GRAM(S): 20 EMULSION INTRAVENOUS at 22:17

## 2017-10-21 RX ADMIN — Medication 1 APPLICATION(S): at 06:11

## 2017-10-21 RX ADMIN — Medication 650 MILLIGRAM(S): at 23:57

## 2017-10-21 RX ADMIN — HYDROMORPHONE HYDROCHLORIDE 0.25 MILLIGRAM(S): 2 INJECTION INTRAMUSCULAR; INTRAVENOUS; SUBCUTANEOUS at 13:31

## 2017-10-21 NOTE — PROGRESS NOTE ADULT - ASSESSMENT
57 F s/p  SBR, fistula resection, esophagojejunostomy revision w/ post-op course complicated by RTOR for distal anastomosis breakdown, ATN, acute respiratory failure, & septic shock, since resolved, complicated by MRSA bacteremia, on vancomycin and resolved.     Plan :  NPO/TPN/IVF  Pain/nausea control - only benadryl at midnight and dilaudid during VAC change and PT session  ISS/OOB  Nystatin powder, vancomycin  SCDs, lovenox, OOBA  Wound vac in place - change today

## 2017-10-21 NOTE — PROGRESS NOTE ADULT - SUBJECTIVE AND OBJECTIVE BOX
SUBJECTIVE: Patient seen and examined bedside by chief resident. No acute events overnight. Denies fever, chills, nausea, emesis, chest pain, dyspnea, abdominal pain.     enoxaparin Injectable 30 milliGRAM(s) SubCutaneous two times a day  heparin  flush 100 Units/mL Injectable 100 Unit(s) IV Push every other day  losartan 50 milliGRAM(s) Oral daily  vancomycin  IVPB 1750 milliGRAM(s) IV Intermittent every 24 hours      Vital Signs Last 24 Hrs  T(C): 37.4 (21 Oct 2017 05:57), Max: 37.4 (21 Oct 2017 05:57)  T(F): 99.4 (21 Oct 2017 05:57), Max: 99.4 (21 Oct 2017 05:57)  HR: 81 (21 Oct 2017 05:57) (78 - 81)  BP: 147/88 (21 Oct 2017 05:57) (142/91 - 147/88)  BP(mean): --  RR: 17 (21 Oct 2017 05:57) (17 - 17)  SpO2: 93% (21 Oct 2017 05:57) (93% - 94%)  I&O's Detail    20 Oct 2017 07:01  -  21 Oct 2017 07:00  --------------------------------------------------------  IN:    Fat Emulsion 20%: 208 mL    TPN (Total Parenteral Nutrition): 803 mL  Total IN: 1011 mL    OUT:    Drain: 50 mL  Total OUT: 50 mL    Total NET: 961 mL      21 Oct 2017 07:01  -  21 Oct 2017 10:29  --------------------------------------------------------  IN:    Fat Emulsion 20%: 20.8 mL    TPN (Total Parenteral Nutrition): 73 mL  Total IN: 93.8 mL    OUT:  Total OUT: 0 mL    Total NET: 93.8 mL          General: NAD, resting comfortably in bed  Pulm: Nonlabored breathing, no respiratory distress  Abd: soft, fistulas contained in ostomy, nonerythematous  Extrem: WWP, no edema, SCDs in place        LABS:                        9.6    7.9   )-----------( 352      ( 21 Oct 2017 09:20 )             31.1     10-21    139  |  99  |  28<H>  ----------------------------<  132<H>  4.4   |  28  |  0.58    Ca    9.7      21 Oct 2017 09:20  Phos  3.2     10-21  Mg     2.0     10-21    TPro  x   /  Alb  2.5<L>  /  TBili  x   /  DBili  x   /  AST  x   /  ALT  x   /  AlkPhos  x   10-20          RADIOLOGY & ADDITIONAL STUDIES:

## 2017-10-22 LAB
GLUCOSE BLDC GLUCOMTR-MCNC: 101 MG/DL — HIGH (ref 70–99)
GLUCOSE BLDC GLUCOMTR-MCNC: 104 MG/DL — HIGH (ref 70–99)
GLUCOSE BLDC GLUCOMTR-MCNC: 132 MG/DL — HIGH (ref 70–99)
GLUCOSE BLDC GLUCOMTR-MCNC: 136 MG/DL — HIGH (ref 70–99)
GLUCOSE BLDC GLUCOMTR-MCNC: 142 MG/DL — HIGH (ref 70–99)
VANCOMYCIN TROUGH SERPL-MCNC: 13.6 UG/ML — SIGNIFICANT CHANGE UP (ref 10–20)

## 2017-10-22 RX ORDER — VANCOMYCIN HCL 1 G
1250 VIAL (EA) INTRAVENOUS EVERY 12 HOURS
Qty: 0 | Refills: 0 | Status: DISCONTINUED | OUTPATIENT
Start: 2017-10-22 | End: 2017-10-23

## 2017-10-22 RX ORDER — HYDROMORPHONE HYDROCHLORIDE 2 MG/ML
0.25 INJECTION INTRAMUSCULAR; INTRAVENOUS; SUBCUTANEOUS ONCE
Qty: 0 | Refills: 0 | Status: DISCONTINUED | OUTPATIENT
Start: 2017-10-22 | End: 2017-10-22

## 2017-10-22 RX ORDER — ELECTROLYTE SOLUTION,INJ
1 VIAL (ML) INTRAVENOUS
Qty: 0 | Refills: 0 | Status: DISCONTINUED | OUTPATIENT
Start: 2017-10-22 | End: 2017-10-22

## 2017-10-22 RX ADMIN — Medication 650 MILLIGRAM(S): at 22:41

## 2017-10-22 RX ADMIN — Medication 300 MILLIGRAM(S): at 18:53

## 2017-10-22 RX ADMIN — PANTOPRAZOLE SODIUM 40 MILLIGRAM(S): 20 TABLET, DELAYED RELEASE ORAL at 06:57

## 2017-10-22 RX ADMIN — NYSTATIN CREAM 1 APPLICATION(S): 100000 CREAM TOPICAL at 06:58

## 2017-10-22 RX ADMIN — Medication 650 MILLIGRAM(S): at 06:49

## 2017-10-22 RX ADMIN — Medication 50 MILLIGRAM(S): at 21:55

## 2017-10-22 RX ADMIN — Medication 10 MILLIGRAM(S): at 06:49

## 2017-10-22 RX ADMIN — Medication 650 MILLIGRAM(S): at 00:57

## 2017-10-22 RX ADMIN — Medication 10 MILLIGRAM(S): at 17:39

## 2017-10-22 RX ADMIN — Medication 1 APPLICATION(S): at 06:58

## 2017-10-22 RX ADMIN — Medication 650 MILLIGRAM(S): at 16:00

## 2017-10-22 RX ADMIN — Medication 650 MILLIGRAM(S): at 21:55

## 2017-10-22 RX ADMIN — ONDANSETRON 4 MILLIGRAM(S): 8 TABLET, FILM COATED ORAL at 06:48

## 2017-10-22 RX ADMIN — Medication 650 MILLIGRAM(S): at 16:30

## 2017-10-22 RX ADMIN — HYDROMORPHONE HYDROCHLORIDE 0.25 MILLIGRAM(S): 2 INJECTION INTRAMUSCULAR; INTRAVENOUS; SUBCUTANEOUS at 13:36

## 2017-10-22 RX ADMIN — Medication 5 MILLIGRAM(S): at 21:54

## 2017-10-22 RX ADMIN — Medication 650 MILLIGRAM(S): at 07:44

## 2017-10-22 RX ADMIN — ONDANSETRON 4 MILLIGRAM(S): 8 TABLET, FILM COATED ORAL at 22:03

## 2017-10-22 RX ADMIN — LOSARTAN POTASSIUM 50 MILLIGRAM(S): 100 TABLET, FILM COATED ORAL at 06:48

## 2017-10-22 RX ADMIN — ENOXAPARIN SODIUM 30 MILLIGRAM(S): 100 INJECTION SUBCUTANEOUS at 06:48

## 2017-10-22 RX ADMIN — HYDROMORPHONE HYDROCHLORIDE 0.25 MILLIGRAM(S): 2 INJECTION INTRAMUSCULAR; INTRAVENOUS; SUBCUTANEOUS at 13:51

## 2017-10-22 RX ADMIN — ENOXAPARIN SODIUM 30 MILLIGRAM(S): 100 INJECTION SUBCUTANEOUS at 17:39

## 2017-10-22 RX ADMIN — NYSTATIN CREAM 1 APPLICATION(S): 100000 CREAM TOPICAL at 17:43

## 2017-10-22 RX ADMIN — Medication 1 EACH: at 17:39

## 2017-10-22 RX ADMIN — ESCITALOPRAM OXALATE 20 MILLIGRAM(S): 10 TABLET, FILM COATED ORAL at 21:54

## 2017-10-22 RX ADMIN — ONDANSETRON 4 MILLIGRAM(S): 8 TABLET, FILM COATED ORAL at 16:00

## 2017-10-22 NOTE — PROGRESS NOTE ADULT - ASSESSMENT
57 F s/p  SBR, fistula resection, esophagojejunostomy revision w/ post-op course complicated by RTOR for distal anastomosis breakdown, ATN, acute respiratory failure, & septic shock, since resolved, complicated by MRSA bacteremia    Plan :  NPO/TPN/IVF  Pain/nausea control - only benadryl at midnight and dilaudid during VAC change and PT session  ISS/OOB  cont vancomycin, check vancomycin trough today  SCDs, lovenox, OOBA  Wound vac in place - change today

## 2017-10-22 NOTE — PROGRESS NOTE ADULT - SUBJECTIVE AND OBJECTIVE BOX
SUBJECTIVE: Patient seen and examined bedside by chief resident. No acute events overnight. Denies fever, chills, nausea, emesis, chest pain, dyspnea, abdominal pain.       T(C): 36.8 (10-22-17 @ 09:30), Max: 37.4 (10-22-17 @ 06:54)  HR: 92 (10-22-17 @ 09:30) (82 - 95)  BP: 148/95 (10-22-17 @ 09:30) (108/- - 164/110)  BP(mean): --  ABP: --  ABP(mean): --  RR: 19 (10-22-17 @ 09:30) (16 - 117)  SpO2: 95% (10-22-17 @ 09:30) (92% - 95%)  Wt(kg): --  CVP(mm Hg): --  CI: --  CAPILLARY BLOOD GLUCOSE      POCT Blood Glucose.: 142 mg/dL (22 Oct 2017 11:11)  POCT Blood Glucose.: 132 mg/dL (22 Oct 2017 06:29)  POCT Blood Glucose.: 101 mg/dL (22 Oct 2017 00:15)  POCT Blood Glucose.: 122 mg/dL (21 Oct 2017 17:08)  POCT Blood Glucose.: 138 mg/dL (21 Oct 2017 11:47)   N/A      10-21 @ 07:01  -  10-22 @ 07:00  --------------------------------------------------------  IN:    Fat Emulsion 20%: 200 mL    Fat Emulsion 20%: 20.8 mL    TPN (Total Parenteral Nutrition): 803 mL  Total IN: 1023.8 mL    OUT:    Drain: 125 mL  Total OUT: 125 mL    Total NET: 898.8 mL      10-22 @ 07:01  -  10-22 @ 11:22  --------------------------------------------------------  IN:    Oral Fluid: 480 mL  Total IN: 480 mL    OUT:    Voided: 500 mL  Total OUT: 500 mL    Total NET: -20 mL          General: NAD, resting comfortably in bed  Pulm: Nonlabored breathing, no respiratory distress  Abd: soft, fistulas contained in ostomy, nonerythematous  Extrem: WWP, no edema, SCDs in place    CBC (10-21 @ 09:20)                              9.6<L>                         7.9     )----------------(  352        --    % Neutrophils, --    % Lymphocytes, ANC: --                                  31.1<L>                BMP (10-21 @ 09:20)             139     |  99      |  28<H> 		Ca++ --      Ca 9.7                ---------------------------------( 132<H>		Mg 2.0                4.4     |  28      |  0.58  			Ph 3.2

## 2017-10-22 NOTE — PROGRESS NOTE ADULT - SUBJECTIVE AND OBJECTIVE BOX
On interval followup, the left subclavian venous catheter site is clean, dry, non-inflamed and non-tender.  T 99 range. Notes and cultures are followed.

## 2017-10-23 LAB
ANION GAP SERPL CALC-SCNC: 10 MMOL/L — SIGNIFICANT CHANGE UP (ref 5–17)
BASOPHILS NFR BLD AUTO: 0.2 % — SIGNIFICANT CHANGE UP (ref 0–2)
BUN SERPL-MCNC: 32 MG/DL — HIGH (ref 7–23)
CALCIUM SERPL-MCNC: 9.8 MG/DL — SIGNIFICANT CHANGE UP (ref 8.4–10.5)
CHLORIDE SERPL-SCNC: 102 MMOL/L — SIGNIFICANT CHANGE UP (ref 96–108)
CO2 SERPL-SCNC: 27 MMOL/L — SIGNIFICANT CHANGE UP (ref 22–31)
CREAT SERPL-MCNC: 0.53 MG/DL — SIGNIFICANT CHANGE UP (ref 0.5–1.3)
EOSINOPHIL NFR BLD AUTO: 2.8 % — SIGNIFICANT CHANGE UP (ref 0–6)
GLUCOSE BLDC GLUCOMTR-MCNC: 111 MG/DL — HIGH (ref 70–99)
GLUCOSE BLDC GLUCOMTR-MCNC: 134 MG/DL — HIGH (ref 70–99)
GLUCOSE BLDC GLUCOMTR-MCNC: 141 MG/DL — HIGH (ref 70–99)
GLUCOSE SERPL-MCNC: 121 MG/DL — HIGH (ref 70–99)
HCT VFR BLD CALC: 29.6 % — LOW (ref 34.5–45)
HGB BLD-MCNC: 9.6 G/DL — LOW (ref 11.5–15.5)
LYMPHOCYTES # BLD AUTO: 30.8 % — SIGNIFICANT CHANGE UP (ref 13–44)
MAGNESIUM SERPL-MCNC: 2 MG/DL — SIGNIFICANT CHANGE UP (ref 1.6–2.6)
MCHC RBC-ENTMCNC: 30.6 PG — SIGNIFICANT CHANGE UP (ref 27–34)
MCHC RBC-ENTMCNC: 32.4 G/DL — SIGNIFICANT CHANGE UP (ref 32–36)
MCV RBC AUTO: 94.3 FL — SIGNIFICANT CHANGE UP (ref 80–100)
MONOCYTES NFR BLD AUTO: 8.3 % — SIGNIFICANT CHANGE UP (ref 2–14)
NEUTROPHILS NFR BLD AUTO: 57.9 % — SIGNIFICANT CHANGE UP (ref 43–77)
PHOSPHATE SERPL-MCNC: 3.1 MG/DL — SIGNIFICANT CHANGE UP (ref 2.5–4.5)
PLATELET # BLD AUTO: 268 K/UL — SIGNIFICANT CHANGE UP (ref 150–400)
POTASSIUM SERPL-MCNC: 4.3 MMOL/L — SIGNIFICANT CHANGE UP (ref 3.5–5.3)
POTASSIUM SERPL-SCNC: 4.3 MMOL/L — SIGNIFICANT CHANGE UP (ref 3.5–5.3)
RBC # BLD: 3.14 M/UL — LOW (ref 3.8–5.2)
RBC # FLD: 17.6 % — HIGH (ref 10.3–16.9)
SODIUM SERPL-SCNC: 139 MMOL/L — SIGNIFICANT CHANGE UP (ref 135–145)
WBC # BLD: 6 K/UL — SIGNIFICANT CHANGE UP (ref 3.8–10.5)
WBC # FLD AUTO: 6 K/UL — SIGNIFICANT CHANGE UP (ref 3.8–10.5)

## 2017-10-23 RX ORDER — ELECTROLYTE SOLUTION,INJ
1 VIAL (ML) INTRAVENOUS
Qty: 0 | Refills: 0 | Status: DISCONTINUED | OUTPATIENT
Start: 2017-10-23 | End: 2017-10-23

## 2017-10-23 RX ORDER — VANCOMYCIN HCL 1 G
1250 VIAL (EA) INTRAVENOUS EVERY 12 HOURS
Qty: 0 | Refills: 0 | Status: DISCONTINUED | OUTPATIENT
Start: 2017-10-23 | End: 2017-10-26

## 2017-10-23 RX ORDER — IBUPROFEN 200 MG
400 TABLET ORAL ONCE
Qty: 0 | Refills: 0 | Status: COMPLETED | OUTPATIENT
Start: 2017-10-23 | End: 2017-10-23

## 2017-10-23 RX ORDER — VANCOMYCIN HCL 1 G
1750 VIAL (EA) INTRAVENOUS EVERY 24 HOURS
Qty: 0 | Refills: 0 | Status: DISCONTINUED | OUTPATIENT
Start: 2017-10-23 | End: 2017-10-23

## 2017-10-23 RX ORDER — I.V. FAT EMULSION 20 G/100ML
50 EMULSION INTRAVENOUS ONCE
Qty: 0 | Refills: 0 | Status: COMPLETED | OUTPATIENT
Start: 2017-10-23 | End: 2017-10-23

## 2017-10-23 RX ORDER — HYDROMORPHONE HYDROCHLORIDE 2 MG/ML
0.25 INJECTION INTRAMUSCULAR; INTRAVENOUS; SUBCUTANEOUS ONCE
Qty: 0 | Refills: 0 | Status: DISCONTINUED | OUTPATIENT
Start: 2017-10-23 | End: 2017-10-23

## 2017-10-23 RX ADMIN — Medication 166.67 MILLIGRAM(S): at 17:38

## 2017-10-23 RX ADMIN — Medication 650 MILLIGRAM(S): at 04:58

## 2017-10-23 RX ADMIN — NYSTATIN CREAM 1 APPLICATION(S): 100000 CREAM TOPICAL at 17:42

## 2017-10-23 RX ADMIN — Medication 650 MILLIGRAM(S): at 10:00

## 2017-10-23 RX ADMIN — Medication 1 APPLICATION(S): at 05:56

## 2017-10-23 RX ADMIN — CALCITRIOL 1 MICROGRAM(S): 0.5 CAPSULE ORAL at 11:22

## 2017-10-23 RX ADMIN — Medication 650 MILLIGRAM(S): at 11:00

## 2017-10-23 RX ADMIN — ONDANSETRON 4 MILLIGRAM(S): 8 TABLET, FILM COATED ORAL at 03:59

## 2017-10-23 RX ADMIN — Medication 650 MILLIGRAM(S): at 22:40

## 2017-10-23 RX ADMIN — Medication 650 MILLIGRAM(S): at 23:26

## 2017-10-23 RX ADMIN — LOSARTAN POTASSIUM 50 MILLIGRAM(S): 100 TABLET, FILM COATED ORAL at 05:57

## 2017-10-23 RX ADMIN — Medication 100 UNIT(S): at 11:22

## 2017-10-23 RX ADMIN — ESCITALOPRAM OXALATE 20 MILLIGRAM(S): 10 TABLET, FILM COATED ORAL at 21:37

## 2017-10-23 RX ADMIN — HYDROMORPHONE HYDROCHLORIDE 0.25 MILLIGRAM(S): 2 INJECTION INTRAMUSCULAR; INTRAVENOUS; SUBCUTANEOUS at 17:01

## 2017-10-23 RX ADMIN — ONDANSETRON 4 MILLIGRAM(S): 8 TABLET, FILM COATED ORAL at 22:40

## 2017-10-23 RX ADMIN — ENOXAPARIN SODIUM 30 MILLIGRAM(S): 100 INJECTION SUBCUTANEOUS at 05:57

## 2017-10-23 RX ADMIN — Medication 650 MILLIGRAM(S): at 16:32

## 2017-10-23 RX ADMIN — Medication 5 MILLIGRAM(S): at 22:39

## 2017-10-23 RX ADMIN — Medication 650 MILLIGRAM(S): at 03:59

## 2017-10-23 RX ADMIN — NYSTATIN CREAM 1 APPLICATION(S): 100000 CREAM TOPICAL at 05:56

## 2017-10-23 RX ADMIN — Medication 10 MILLIGRAM(S): at 05:57

## 2017-10-23 RX ADMIN — Medication 10 MILLIGRAM(S): at 17:38

## 2017-10-23 RX ADMIN — Medication 300 MILLIGRAM(S): at 05:57

## 2017-10-23 RX ADMIN — I.V. FAT EMULSION 20.83 GRAM(S): 20 EMULSION INTRAVENOUS at 21:37

## 2017-10-23 RX ADMIN — ONDANSETRON 4 MILLIGRAM(S): 8 TABLET, FILM COATED ORAL at 16:32

## 2017-10-23 RX ADMIN — ENOXAPARIN SODIUM 30 MILLIGRAM(S): 100 INJECTION SUBCUTANEOUS at 17:38

## 2017-10-23 RX ADMIN — PANTOPRAZOLE SODIUM 40 MILLIGRAM(S): 20 TABLET, DELAYED RELEASE ORAL at 05:57

## 2017-10-23 RX ADMIN — ONDANSETRON 4 MILLIGRAM(S): 8 TABLET, FILM COATED ORAL at 10:00

## 2017-10-23 RX ADMIN — Medication 50 MILLIGRAM(S): at 21:38

## 2017-10-23 RX ADMIN — Medication 1 EACH: at 17:37

## 2017-10-23 NOTE — PROGRESS NOTE ADULT - SUBJECTIVE AND OBJECTIVE BOX
remains stable  have spent time discussing plan which is to wait until the bowel is less congealed and reconstruct  had Dr Galeas see and also will discuss with dr marmolejo  current plan is to proceed to reconstruction in [facility]  but will get opinions from above  the issue is to wait an adequate time for bowel adhesions to be less fibrous but also realize that patient is at best staying at same level  activity is not increasing, odor is difficult and wound not stable or hygiene easily accomplished to allow an indefinite amount of time to occur before reconstruction.  balancing these issues is complex and think that the 3 month window in november is reasonable but again will gather several opinions

## 2017-10-23 NOTE — PROGRESS NOTE ADULT - SUBJECTIVE AND OBJECTIVE BOX
O/N: VSS. BRAYDEN  10/22: dressing changed, vancomycin trough 13, changed from q24 to q12 dosing O/N: VSS. BRAYDEN  10/22: dressing changed, vancomycin trough 13, changed from q24 to q12 dosing     SUBJECTIVE: Patient seen and examined bedside by chief resident. No nausea/vomiting, no abd pain, fistula manager leak at the side.    enoxaparin Injectable 30 milliGRAM(s) SubCutaneous two times a day  heparin  flush 100 Units/mL Injectable 100 Unit(s) IV Push every other day  losartan 50 milliGRAM(s) Oral daily  vancomycin  IVPB 1750 milliGRAM(s) IV Intermittent every 24 hours      Vital Signs Last 24 Hrs  T(C): 36.3 (23 Oct 2017 06:08), Max: 36.9 (22 Oct 2017 14:01)  T(F): 97.4 (23 Oct 2017 06:08), Max: 98.4 (22 Oct 2017 14:01)  HR: 76 (23 Oct 2017 06:08) (75 - 93)  BP: 148/94 (23 Oct 2017 06:08) (133/85 - 148/95)  BP(mean): --  RR: 18 (23 Oct 2017 06:08) (16 - 19)  SpO2: 96% (23 Oct 2017 06:08) (92% - 97%)  I&O's Detail    22 Oct 2017 07:01  -  23 Oct 2017 07:00  --------------------------------------------------------  IN:    Oral Fluid: 480 mL    Solution: 500 mL    TPN (Total Parenteral Nutrition): 876 mL    TPN (Total Parenteral Nutrition): 876 mL  Total IN: 2732 mL    OUT:    Drain: 150 mL    Voided: 500 mL  Total OUT: 650 mL    Total NET: 2082 mL          General: NAD, resting comfortably in bed  C/V: NSR  Pulm: Nonlabored breathing, no respiratory distress  Abd: soft, NT/ND. wound size [id number], 4 fistulas. fistula manager in place with leak over the lateral side.  Extrem: WWP, no edema, SCDs in place        LABS:                        9.6    7.9   )-----------( 352      ( 21 Oct 2017 09:20 )             31.1     10-21    139  |  99  |  28<H>  ----------------------------<  132<H>  4.4   |  28  |  0.58    Ca    9.7      21 Oct 2017 09:20  Phos  3.2     10-21  Mg     2.0     10-21            RADIOLOGY & ADDITIONAL STUDIES:

## 2017-10-23 NOTE — PROGRESS NOTE ADULT - ASSESSMENT
57 F s/p  SBR, fistula resection, esophagojejunostomy revision w/ post-op course complicated by RTOR for distal anastomosis breakdown, ATN, acute respiratory failure, & septic shock, since resolved.  NPO/TPN/IVF  Pain/nausea control - only benadryl at midnight and dilaudid during VAC change  ISS  L IJ  Nystatin powder, vancomycin  SCDs, lovenox, OOBA  Wound vac in place  Wounds: Midline xiphoid to pubis incision; DTI & stage 2 pressure ulcer noted. 57 F s/p  SBR, fistula resection, esophagojejunostomy revision w/ post-op course complicated by RTOR for distal anastomosis breakdown, ATN, acute respiratory failure, & septic shock, since resolved. c/w MRSA, on [id number], repeated blood culture neg for MRSA.     Plan:  IV dilaudid 0.25mg only after PT session  Benadryl at night  diet : Ice chips and sips/IVF and TPN  wound care : fistula manager if it leaks  AM labs   ID : Vancomycin 1750mg Q24, check vanco trough today at 4pm  PT session daily

## 2017-10-24 LAB
ALBUMIN SERPL ELPH-MCNC: 2.6 G/DL — LOW (ref 3.3–5)
ALP SERPL-CCNC: 252 U/L — HIGH (ref 40–120)
ALT FLD-CCNC: 27 U/L — SIGNIFICANT CHANGE UP (ref 10–45)
ANION GAP SERPL CALC-SCNC: 11 MMOL/L — SIGNIFICANT CHANGE UP (ref 5–17)
AST SERPL-CCNC: 18 U/L — SIGNIFICANT CHANGE UP (ref 10–40)
BILIRUB SERPL-MCNC: 0.6 MG/DL — SIGNIFICANT CHANGE UP (ref 0.2–1.2)
BUN SERPL-MCNC: 29 MG/DL — HIGH (ref 7–23)
CALCIUM SERPL-MCNC: 9.6 MG/DL — SIGNIFICANT CHANGE UP (ref 8.4–10.5)
CHLORIDE SERPL-SCNC: 100 MMOL/L — SIGNIFICANT CHANGE UP (ref 96–108)
CO2 SERPL-SCNC: 26 MMOL/L — SIGNIFICANT CHANGE UP (ref 22–31)
CREAT SERPL-MCNC: 0.51 MG/DL — SIGNIFICANT CHANGE UP (ref 0.5–1.3)
GLUCOSE BLDC GLUCOMTR-MCNC: 119 MG/DL — HIGH (ref 70–99)
GLUCOSE BLDC GLUCOMTR-MCNC: 125 MG/DL — HIGH (ref 70–99)
GLUCOSE BLDC GLUCOMTR-MCNC: 127 MG/DL — HIGH (ref 70–99)
GLUCOSE BLDC GLUCOMTR-MCNC: 134 MG/DL — HIGH (ref 70–99)
GLUCOSE BLDC GLUCOMTR-MCNC: 144 MG/DL — HIGH (ref 70–99)
GLUCOSE SERPL-MCNC: 137 MG/DL — HIGH (ref 70–99)
HCT VFR BLD CALC: 30.8 % — LOW (ref 34.5–45)
HGB BLD-MCNC: 9.2 G/DL — LOW (ref 11.5–15.5)
MAGNESIUM SERPL-MCNC: 2 MG/DL — SIGNIFICANT CHANGE UP (ref 1.6–2.6)
MCHC RBC-ENTMCNC: 28.1 PG — SIGNIFICANT CHANGE UP (ref 27–34)
MCHC RBC-ENTMCNC: 29.9 G/DL — LOW (ref 32–36)
MCV RBC AUTO: 94.2 FL — SIGNIFICANT CHANGE UP (ref 80–100)
PHOSPHATE SERPL-MCNC: 3.4 MG/DL — SIGNIFICANT CHANGE UP (ref 2.5–4.5)
PLATELET # BLD AUTO: 275 K/UL — SIGNIFICANT CHANGE UP (ref 150–400)
POTASSIUM SERPL-MCNC: 4 MMOL/L — SIGNIFICANT CHANGE UP (ref 3.5–5.3)
POTASSIUM SERPL-SCNC: 4 MMOL/L — SIGNIFICANT CHANGE UP (ref 3.5–5.3)
PROT SERPL-MCNC: 6.4 G/DL — SIGNIFICANT CHANGE UP (ref 6–8.3)
RBC # BLD: 3.27 M/UL — LOW (ref 3.8–5.2)
RBC # FLD: 17.1 % — HIGH (ref 10.3–16.9)
SODIUM SERPL-SCNC: 137 MMOL/L — SIGNIFICANT CHANGE UP (ref 135–145)
VANCOMYCIN TROUGH SERPL-MCNC: 15 UG/ML — SIGNIFICANT CHANGE UP (ref 10–20)
WBC # BLD: 5.9 K/UL — SIGNIFICANT CHANGE UP (ref 3.8–10.5)
WBC # FLD AUTO: 5.9 K/UL — SIGNIFICANT CHANGE UP (ref 3.8–10.5)

## 2017-10-24 RX ORDER — ELECTROLYTE SOLUTION,INJ
1 VIAL (ML) INTRAVENOUS
Qty: 0 | Refills: 0 | Status: DISCONTINUED | OUTPATIENT
Start: 2017-10-24 | End: 2017-10-24

## 2017-10-24 RX ORDER — I.V. FAT EMULSION 20 G/100ML
50 EMULSION INTRAVENOUS
Qty: 0 | Refills: 0 | Status: DISCONTINUED | OUTPATIENT
Start: 2017-10-24 | End: 2017-10-24

## 2017-10-24 RX ADMIN — ENOXAPARIN SODIUM 30 MILLIGRAM(S): 100 INJECTION SUBCUTANEOUS at 17:33

## 2017-10-24 RX ADMIN — Medication 650 MILLIGRAM(S): at 11:02

## 2017-10-24 RX ADMIN — Medication 650 MILLIGRAM(S): at 12:00

## 2017-10-24 RX ADMIN — Medication 650 MILLIGRAM(S): at 17:32

## 2017-10-24 RX ADMIN — Medication 650 MILLIGRAM(S): at 05:30

## 2017-10-24 RX ADMIN — SODIUM CHLORIDE 10 MILLILITER(S): 9 INJECTION INTRAMUSCULAR; INTRAVENOUS; SUBCUTANEOUS at 23:47

## 2017-10-24 RX ADMIN — ONDANSETRON 4 MILLIGRAM(S): 8 TABLET, FILM COATED ORAL at 23:47

## 2017-10-24 RX ADMIN — Medication 166.67 MILLIGRAM(S): at 15:40

## 2017-10-24 RX ADMIN — Medication 650 MILLIGRAM(S): at 23:46

## 2017-10-24 RX ADMIN — Medication 650 MILLIGRAM(S): at 04:39

## 2017-10-24 RX ADMIN — ENOXAPARIN SODIUM 30 MILLIGRAM(S): 100 INJECTION SUBCUTANEOUS at 06:18

## 2017-10-24 RX ADMIN — ONDANSETRON 4 MILLIGRAM(S): 8 TABLET, FILM COATED ORAL at 04:39

## 2017-10-24 RX ADMIN — LOSARTAN POTASSIUM 50 MILLIGRAM(S): 100 TABLET, FILM COATED ORAL at 06:17

## 2017-10-24 RX ADMIN — NYSTATIN CREAM 1 APPLICATION(S): 100000 CREAM TOPICAL at 17:33

## 2017-10-24 RX ADMIN — Medication 10 MILLIGRAM(S): at 17:33

## 2017-10-24 RX ADMIN — Medication 10 MILLIGRAM(S): at 06:17

## 2017-10-24 RX ADMIN — PANTOPRAZOLE SODIUM 40 MILLIGRAM(S): 20 TABLET, DELAYED RELEASE ORAL at 06:17

## 2017-10-24 RX ADMIN — Medication 5 MILLIGRAM(S): at 21:21

## 2017-10-24 RX ADMIN — ESCITALOPRAM OXALATE 20 MILLIGRAM(S): 10 TABLET, FILM COATED ORAL at 21:21

## 2017-10-24 RX ADMIN — HYDROMORPHONE HYDROCHLORIDE 0.25 MILLIGRAM(S): 2 INJECTION INTRAMUSCULAR; INTRAVENOUS; SUBCUTANEOUS at 16:17

## 2017-10-24 RX ADMIN — Medication 650 MILLIGRAM(S): at 18:32

## 2017-10-24 RX ADMIN — Medication 1 EACH: at 18:02

## 2017-10-24 RX ADMIN — Medication 1 APPLICATION(S): at 06:18

## 2017-10-24 RX ADMIN — HYDROMORPHONE HYDROCHLORIDE 0.25 MILLIGRAM(S): 2 INJECTION INTRAMUSCULAR; INTRAVENOUS; SUBCUTANEOUS at 16:35

## 2017-10-24 RX ADMIN — ONDANSETRON 4 MILLIGRAM(S): 8 TABLET, FILM COATED ORAL at 17:33

## 2017-10-24 RX ADMIN — Medication 50 MILLIGRAM(S): at 21:21

## 2017-10-24 RX ADMIN — NYSTATIN CREAM 1 APPLICATION(S): 100000 CREAM TOPICAL at 06:17

## 2017-10-24 RX ADMIN — ONDANSETRON 4 MILLIGRAM(S): 8 TABLET, FILM COATED ORAL at 11:02

## 2017-10-24 NOTE — CHART NOTE - NSCHARTNOTEFT_GEN_A_CORE
Admitting Diagnosis:   Patient is a 57y old  Female who presents with a chief complaint of enterocutaneous fistula (24 Jul 2017 14:15)      PAST MEDICAL & SURGICAL HISTORY:  Reflux  Obesity  DVT (deep venous thrombosis): 2013 neck  Diverticulitis  Hypertension  Elective surgery: abodominal wall surgery  dec 2016  Elective surgery: NOV 2016 gastric bypass revision  Gastric bypass status for obesity: gastric sleeve, 6/2012  S/P breast biopsy: 2011, left  S/P colon resection: 2011  S/P knee surgery: repair 2009, 2011  right; left  Umbilical hernia: 2000  S/P appendectomy: 1975  S/P tonsillectomy: 1967      Current Nutrition Order:  NPO with Ice Chips/Sips of Water   TPN via PICC line:  312g D,  150g AA, 50g 20% lipids. Providing pt with 2160 kcal, 2.9g kg IBW protein, 1750 mL fluid. GIR = 2.9.  **See recs below for TPN order    PO Intake: Good (%) [   ]  Fair (50-75%) [   ] Poor (<25%) [   ] N/A    GI Issues: No apparent GI distress per RN. W/ fistula bag.     Pain: Pain controlled.     Skin Integrity: Midline xiphoid to pubis incision; unstageable sacrum and stage 2 L buttoock pressure ulcer.     Labs:   10-24    137  |  100  |  29<H>  ----------------------------<  137<H>  4.0   |  26  |  0.51    Ca    9.6      24 Oct 2017 13:12  Phos  3.4     10-24  Mg     2.0     10-24    TPro  6.4  /  Alb  2.6<L>  /  TBili  0.6  /  DBili  x   /  AST  18  /  ALT  27  /  AlkPhos  252<H>  10-24    CAPILLARY BLOOD GLUCOSE      POCT Blood Glucose.: 127 mg/dL (24 Oct 2017 11:54)  POCT Blood Glucose.: 125 mg/dL (24 Oct 2017 05:53)  POCT Blood Glucose.: 119 mg/dL (24 Oct 2017 00:04)  POCT Blood Glucose.: 141 mg/dL (23 Oct 2017 17:39)      Medications:  MEDICATIONS  (STANDING):  calcitriol Injectable 1 MICROGram(s) IV Push <User Schedule>  collagenase Ointment 1 Application(s) Topical daily  cyanocobalamin Injectable 1000 MICROGram(s) SubCutaneous every 7 days  dextrose 5%. 1000 milliLiter(s) (50 mL/Hr) IV Continuous <Continuous>  dextrose 50% Injectable 25 Gram(s) IV Push once  dextrose 50% Injectable 12.5 Gram(s) IV Push once  diazepam    Tablet 5 milliGRAM(s) Oral at bedtime  diphenhydrAMINE   Injectable 50 milliGRAM(s) IV Push at bedtime  enoxaparin Injectable 30 milliGRAM(s) SubCutaneous two times a day  escitalopram 20 milliGRAM(s) Oral daily  heparin  flush 100 Units/mL Injectable 100 Unit(s) IV Push every other day  ibuprofen  Tablet 400 milliGRAM(s) Oral once  insulin lispro (HumaLOG) corrective regimen sliding scale   SubCutaneous every 6 hours  losartan 50 milliGRAM(s) Oral daily  nystatin Powder 1 Application(s) Topical two times a day  oxybutynin 10 milliGRAM(s) Oral two times a day  pantoprazole  Injectable 40 milliGRAM(s) IV Push daily  Parenteral Nutrition - Adult 1 Each (73 mL/Hr) TPN Continuous <Continuous>  Parenteral Nutrition - Adult 1 Each (73 mL/Hr) TPN Continuous <Continuous>  sodium chloride 0.9% lock flush 20 milliLiter(s) IV Push once  vancomycin  IVPB 1250 milliGRAM(s) IV Intermittent every 12 hours    MEDICATIONS  (PRN):  acetaminophen   Tablet. 650 milliGRAM(s) Oral every 6 hours PRN Moderate Pain (4 - 6)  HYDROmorphone  Injectable 0.25 milliGRAM(s) IV Push daily PRN Severe Pain (7 - 10)  ondansetron Injectable 4 milliGRAM(s) IV Push every 6 hours PRN Nausea and/or Vomiting  sodium chloride 0.9% lock flush 10 milliLiter(s) IV Push every 1 hour PRN After each medication administration  sodium chloride 0.9% lock flush 10 milliLiter(s) IV Push every 12 hours PRN Lumen of catheter NOT used      Weight:  New wt taken 10/17: 164lbs/74.6kg     Weight Change:   164lb (10/17)  219lb (8/1)  Indicates a 55lb wt loss in 2.5 months. (25% original BW)    Estimated energy needs using 52 kg IBW:  increased needs per wound and pressure ulcer healing  30-35 kcal/kg (2751-0320 kcal).   1.8-2 g/kg ( g protein).  30-35 mL/kg (3510-8710 mL fluid).     Subjective: Continues to receive nutrition via TPN and tolerating well. No GI distress per RN. Pain/nausea controlled. Noted w/ new wt pt has had 25% wt loss since August. Please obtain new wt to further trend wt changes and adjust nutrient needs accordingly.       Previous Nutrition Diagnosis: Increased nutrient needs RT increased demand for nutrients AEB wound and pressure ulcer healing   Active [  X]  Resolved [   ]    Goal: PT to meet >75% EER via tolerated route    Recommendations:  1) Recommend 110g AA, 312g Dex, 50g 20% lipids 3-4x/day (2000kcal, 2.1g pro/kg IBW, GIR 2.19). Fluids per team  2) Please continue to take standing weight for trending purposes  3) AA to 110g and Dex to 312g.  4) PO diet per MD discretion   5) Continue to check labs: triglycerides, BG & FS and adjust TPN per MD discretion  6) Check labs for signs of fat malabsorption, consider ADEK vitamin.    Education: Understands meeting nutrient needs via TPN.    Risk Level: High [ X  ] Moderate [   ] Low [   ].

## 2017-10-24 NOTE — PROGRESS NOTE ADULT - ASSESSMENT
57 F s/p  SBR, fistula resection, esophagojejunostomy revision w/ post-op course complicated by RTOR for distal anastomosis breakdown, ATN, acute respiratory failure, & septic shock, MRSA bacteremia which resolved.    NPO with sips and chips /TPN/IVF  Pain/nausea control - only benadryl at midnight and dilaudid 0.25mg during PT session and wound change only  ISS/OOB with PT daily  Nystatin powder, vancomycin 1250 Q12 (10/11-11/8)  SCDs, lovenox, OOBA  Lines :  L left subclavian line   Wound care : fistula manager - change once/2 times a week unless severely leak.  Wounds: Midline xiphoid to pubis incision; DTI & stage 2 pressure ulcer noted.

## 2017-10-24 NOTE — PROGRESS NOTE ADULT - SUBJECTIVE AND OBJECTIVE BOX
Pastient seems happy this morning as daughter is pregnant  more cooperative with PT  bun is up cr is stable   this could mean that is anabolic which would be positive   repeat total protein and albumin  continue present management

## 2017-10-25 LAB
ANION GAP SERPL CALC-SCNC: 14 MMOL/L — SIGNIFICANT CHANGE UP (ref 5–17)
BUN SERPL-MCNC: 28 MG/DL — HIGH (ref 7–23)
CALCIUM SERPL-MCNC: 9.8 MG/DL — SIGNIFICANT CHANGE UP (ref 8.4–10.5)
CHLORIDE SERPL-SCNC: 97 MMOL/L — SIGNIFICANT CHANGE UP (ref 96–108)
CO2 SERPL-SCNC: 24 MMOL/L — SIGNIFICANT CHANGE UP (ref 22–31)
CREAT SERPL-MCNC: 0.51 MG/DL — SIGNIFICANT CHANGE UP (ref 0.5–1.3)
GLUCOSE BLDC GLUCOMTR-MCNC: 124 MG/DL — HIGH (ref 70–99)
GLUCOSE BLDC GLUCOMTR-MCNC: 132 MG/DL — HIGH (ref 70–99)
GLUCOSE BLDC GLUCOMTR-MCNC: 136 MG/DL — HIGH (ref 70–99)
GLUCOSE BLDC GLUCOMTR-MCNC: 84 MG/DL — SIGNIFICANT CHANGE UP (ref 70–99)
GLUCOSE SERPL-MCNC: 151 MG/DL — HIGH (ref 70–99)
HCT VFR BLD CALC: 30 % — LOW (ref 34.5–45)
HGB BLD-MCNC: 9.6 G/DL — LOW (ref 11.5–15.5)
MAGNESIUM SERPL-MCNC: 2 MG/DL — SIGNIFICANT CHANGE UP (ref 1.6–2.6)
MCHC RBC-ENTMCNC: 29.4 PG — SIGNIFICANT CHANGE UP (ref 27–34)
MCHC RBC-ENTMCNC: 32 G/DL — SIGNIFICANT CHANGE UP (ref 32–36)
MCV RBC AUTO: 92 FL — SIGNIFICANT CHANGE UP (ref 80–100)
PHOSPHATE SERPL-MCNC: 3.6 MG/DL — SIGNIFICANT CHANGE UP (ref 2.5–4.5)
PLATELET # BLD AUTO: 269 K/UL — SIGNIFICANT CHANGE UP (ref 150–400)
POTASSIUM SERPL-MCNC: 4.3 MMOL/L — SIGNIFICANT CHANGE UP (ref 3.5–5.3)
POTASSIUM SERPL-SCNC: 4.3 MMOL/L — SIGNIFICANT CHANGE UP (ref 3.5–5.3)
RBC # BLD: 3.26 M/UL — LOW (ref 3.8–5.2)
RBC # FLD: 17.3 % — HIGH (ref 10.3–16.9)
SODIUM SERPL-SCNC: 135 MMOL/L — SIGNIFICANT CHANGE UP (ref 135–145)
WBC # BLD: 6.5 K/UL — SIGNIFICANT CHANGE UP (ref 3.8–10.5)
WBC # FLD AUTO: 6.5 K/UL — SIGNIFICANT CHANGE UP (ref 3.8–10.5)

## 2017-10-25 RX ORDER — I.V. FAT EMULSION 20 G/100ML
50 EMULSION INTRAVENOUS ONCE
Qty: 0 | Refills: 0 | Status: COMPLETED | OUTPATIENT
Start: 2017-10-25 | End: 2017-10-25

## 2017-10-25 RX ORDER — ELECTROLYTE SOLUTION,INJ
1 VIAL (ML) INTRAVENOUS
Qty: 0 | Refills: 0 | Status: DISCONTINUED | OUTPATIENT
Start: 2017-10-25 | End: 2017-10-25

## 2017-10-25 RX ADMIN — Medication 166.67 MILLIGRAM(S): at 02:14

## 2017-10-25 RX ADMIN — Medication 166.67 MILLIGRAM(S): at 14:37

## 2017-10-25 RX ADMIN — HYDROMORPHONE HYDROCHLORIDE 0.25 MILLIGRAM(S): 2 INJECTION INTRAMUSCULAR; INTRAVENOUS; SUBCUTANEOUS at 09:31

## 2017-10-25 RX ADMIN — ESCITALOPRAM OXALATE 20 MILLIGRAM(S): 10 TABLET, FILM COATED ORAL at 21:21

## 2017-10-25 RX ADMIN — Medication 650 MILLIGRAM(S): at 00:30

## 2017-10-25 RX ADMIN — Medication 5 MILLIGRAM(S): at 21:21

## 2017-10-25 RX ADMIN — PANTOPRAZOLE SODIUM 40 MILLIGRAM(S): 20 TABLET, DELAYED RELEASE ORAL at 05:49

## 2017-10-25 RX ADMIN — PREGABALIN 1000 MICROGRAM(S): 225 CAPSULE ORAL at 12:14

## 2017-10-25 RX ADMIN — SODIUM CHLORIDE 10 MILLILITER(S): 9 INJECTION INTRAMUSCULAR; INTRAVENOUS; SUBCUTANEOUS at 05:49

## 2017-10-25 RX ADMIN — Medication 1 APPLICATION(S): at 05:50

## 2017-10-25 RX ADMIN — ENOXAPARIN SODIUM 30 MILLIGRAM(S): 100 INJECTION SUBCUTANEOUS at 18:07

## 2017-10-25 RX ADMIN — Medication 650 MILLIGRAM(S): at 14:37

## 2017-10-25 RX ADMIN — Medication 10 MILLIGRAM(S): at 05:49

## 2017-10-25 RX ADMIN — Medication 50 MILLIGRAM(S): at 21:21

## 2017-10-25 RX ADMIN — Medication 10 MILLIGRAM(S): at 18:07

## 2017-10-25 RX ADMIN — CALCITRIOL 1 MICROGRAM(S): 0.5 CAPSULE ORAL at 12:14

## 2017-10-25 RX ADMIN — Medication 650 MILLIGRAM(S): at 22:30

## 2017-10-25 RX ADMIN — LOSARTAN POTASSIUM 50 MILLIGRAM(S): 100 TABLET, FILM COATED ORAL at 05:49

## 2017-10-25 RX ADMIN — Medication 650 MILLIGRAM(S): at 21:21

## 2017-10-25 RX ADMIN — Medication 650 MILLIGRAM(S): at 06:30

## 2017-10-25 RX ADMIN — NYSTATIN CREAM 1 APPLICATION(S): 100000 CREAM TOPICAL at 05:50

## 2017-10-25 RX ADMIN — Medication 100 UNIT(S): at 12:14

## 2017-10-25 RX ADMIN — ONDANSETRON 4 MILLIGRAM(S): 8 TABLET, FILM COATED ORAL at 18:08

## 2017-10-25 RX ADMIN — Medication 650 MILLIGRAM(S): at 15:37

## 2017-10-25 RX ADMIN — I.V. FAT EMULSION 20.83 GRAM(S): 20 EMULSION INTRAVENOUS at 18:09

## 2017-10-25 RX ADMIN — NYSTATIN CREAM 1 APPLICATION(S): 100000 CREAM TOPICAL at 18:09

## 2017-10-25 RX ADMIN — ENOXAPARIN SODIUM 30 MILLIGRAM(S): 100 INJECTION SUBCUTANEOUS at 05:49

## 2017-10-25 RX ADMIN — HYDROMORPHONE HYDROCHLORIDE 0.25 MILLIGRAM(S): 2 INJECTION INTRAMUSCULAR; INTRAVENOUS; SUBCUTANEOUS at 09:14

## 2017-10-25 RX ADMIN — ONDANSETRON 4 MILLIGRAM(S): 8 TABLET, FILM COATED ORAL at 05:49

## 2017-10-25 RX ADMIN — Medication 650 MILLIGRAM(S): at 05:49

## 2017-10-25 RX ADMIN — ONDANSETRON 4 MILLIGRAM(S): 8 TABLET, FILM COATED ORAL at 12:14

## 2017-10-25 NOTE — PROGRESS NOTE ADULT - SUBJECTIVE AND OBJECTIVE BOX
ON: BRAYDEN  10/24: TPN order, VSS BRAYDEN, dressing reinforced by Lucy wound care nurse , vanco trough 15 continue  regular vanco dosage         57 F s/p  SBR, fistula resection, esophagojejunostomy revision w/ post-op course complicated by RTOR for distal anastomosis breakdown, ATN, acute respiratory failure, & septic shock, MRSA bacteremia which resolved.    NPO with sips and chips /TPN/IVF  Pain/nausea control - only benadryl at midnight and dilaudid 0.25mg during PT session and wound change only  ISS/OOB with PT daily  Nystatin powder, vancomycin 1250 Q12 (10/11-11/8)  SCDs, lovenox, OOBA  Lines :  L left subclavian line   Wound care : fistula manager - change once/2 times a week unless severely leak.  Wounds: Midline xiphoid to pubis incision; DTI & stage 2 pressure ulcer noted. OVERNIGHT EVENTS: BRAYDEN  10/24: TPN order, VSS BRAYDEN, dressing reinforced by Lucy wound care nurse , vanco trough 15 continue  regular vanco dosage     STATUS POST:      7/24: SBR resection, fistula resection, revision of esophagogegunostomy drain through esophageal hiatus in R mediastinum  7/29: Ex-lap, SB anastamosis in BP limb breakdown with succus throughout abdomen  8/1: abd washout, drainage of abscess, biologic mesh placement, closure of abdominal wall, vac and retention suture  8/7: abd washout, mesh removal, frozen abd noted, LLQ CHECO replaced with benson drain  8/10: leak noted from previous anastamosis site on L side, mid abdomen malecot drain placed in enterotomy, JPs x 2 placed, necrotic fascia debrided, vicryl mesh sutured to fascia circumferentially, xeroform over mesh then vac dressing placed	      SUBJECTIVE:  Flatus: [X] YES [] NO             Bowel Movement: [X ] YES [ ] NO  Pain (0-10):            Pain Control Adequate: [X ] YES [ ] NO  Nausea: [ ] YES [X ] NO            Vomiting: [ ] YES [X ] NO  Diarrhea: [ ] YES [X ] NO         Constipation: [ ] YES [X ] NO     Chest Pain: [ ] YES [X ] NO    SOB:  [ ] YES [X ] NO    enoxaparin Injectable 30 milliGRAM(s) SubCutaneous two times a day  heparin  flush 100 Units/mL Injectable 100 Unit(s) IV Push every other day  losartan 50 milliGRAM(s) Oral daily  vancomycin  IVPB 1250 milliGRAM(s) IV Intermittent every 12 hours      Vital Signs Last 24 Hrs  T(C): 37.2 (25 Oct 2017 08:45), Max: 37.7 (25 Oct 2017 05:50)  T(F): 99 (25 Oct 2017 08:45), Max: 99.8 (25 Oct 2017 05:50)  HR: 88 (25 Oct 2017 08:45) (76 - 89)  BP: 130/79 (25 Oct 2017 08:45) (130/79 - 135/85)  BP(mean): --  RR: 16 (25 Oct 2017 08:45) (16 - 17)  SpO2: 96% (25 Oct 2017 08:45) (93% - 99%)  I&O's Detail    24 Oct 2017 07:01  -  25 Oct 2017 07:00  --------------------------------------------------------  IN:    TPN (Total Parenteral Nutrition): 292 mL  Total IN: 292 mL    OUT:    Drain: 20 mL  Total OUT: 20 mL    Total NET: 272 mL          General: NAD, resting comfortably in bed  C/V: NSR  Pulm: Nonlabored breathing, no respiratory distress  Abd: soft, non  distended , TTP around wound site, granulating tissue observed.  Extrem: WWP, no edema, SCDs in place        LABS:                        9.6    6.5   )-----------( 269      ( 25 Oct 2017 09:49 )             30.0     10-25    135  |  97  |  28<H>  ----------------------------<  151<H>  4.3   |  24  |  0.51    Ca    9.8      25 Oct 2017 09:49  Phos  3.6     10-25  Mg     2.0     10-25    TPro  6.4  /  Alb  2.6<L>  /  TBili  0.6  /  DBili  x   /  AST  18  /  ALT  27  /  AlkPhos  252<H>  10-24

## 2017-10-25 NOTE — PROGRESS NOTE ADULT - SUBJECTIVE AND OBJECTIVE BOX
On interval followup, the left subclavian venous catheter site is clean, dry, non-inflamed and non-tender. T 99 range. Notes, cultures and plans are followed.

## 2017-10-25 NOTE — PROGRESS NOTE ADULT - SUBJECTIVE AND OBJECTIVE BOX
have been getting multiple opinions about optimal timing  albumin and total protein improved  redid dressing and will try vac again with white sponge and cannulation  also with see if dr sotomayor can do scope and place a distal tube through fistula to see if possibility of enteral feeding  if can control and aliment than time an advantage  if cannot will have to consider timing of surgery

## 2017-10-26 LAB
ANION GAP SERPL CALC-SCNC: 13 MMOL/L — SIGNIFICANT CHANGE UP (ref 5–17)
BUN SERPL-MCNC: 28 MG/DL — HIGH (ref 7–23)
CALCIUM SERPL-MCNC: 9.8 MG/DL — SIGNIFICANT CHANGE UP (ref 8.4–10.5)
CHLORIDE SERPL-SCNC: 100 MMOL/L — SIGNIFICANT CHANGE UP (ref 96–108)
CO2 SERPL-SCNC: 25 MMOL/L — SIGNIFICANT CHANGE UP (ref 22–31)
CREAT SERPL-MCNC: 0.5 MG/DL — SIGNIFICANT CHANGE UP (ref 0.5–1.3)
GLUCOSE BLDC GLUCOMTR-MCNC: 121 MG/DL — HIGH (ref 70–99)
GLUCOSE BLDC GLUCOMTR-MCNC: 124 MG/DL — HIGH (ref 70–99)
GLUCOSE BLDC GLUCOMTR-MCNC: 130 MG/DL — HIGH (ref 70–99)
GLUCOSE BLDC GLUCOMTR-MCNC: 143 MG/DL — HIGH (ref 70–99)
GLUCOSE SERPL-MCNC: 127 MG/DL — HIGH (ref 70–99)
HCT VFR BLD CALC: 32.8 % — LOW (ref 34.5–45)
HGB BLD-MCNC: 9.8 G/DL — LOW (ref 11.5–15.5)
MAGNESIUM SERPL-MCNC: 2 MG/DL — SIGNIFICANT CHANGE UP (ref 1.6–2.6)
MCHC RBC-ENTMCNC: 28.2 PG — SIGNIFICANT CHANGE UP (ref 27–34)
MCHC RBC-ENTMCNC: 29.9 G/DL — LOW (ref 32–36)
MCV RBC AUTO: 94.5 FL — SIGNIFICANT CHANGE UP (ref 80–100)
PHOSPHATE SERPL-MCNC: 3.2 MG/DL — SIGNIFICANT CHANGE UP (ref 2.5–4.5)
PLATELET # BLD AUTO: 278 K/UL — SIGNIFICANT CHANGE UP (ref 150–400)
POTASSIUM SERPL-MCNC: 3.9 MMOL/L — SIGNIFICANT CHANGE UP (ref 3.5–5.3)
POTASSIUM SERPL-SCNC: 3.9 MMOL/L — SIGNIFICANT CHANGE UP (ref 3.5–5.3)
RBC # BLD: 3.47 M/UL — LOW (ref 3.8–5.2)
RBC # FLD: 17 % — HIGH (ref 10.3–16.9)
SODIUM SERPL-SCNC: 138 MMOL/L — SIGNIFICANT CHANGE UP (ref 135–145)
VANCOMYCIN TROUGH SERPL-MCNC: 21.9 UG/ML — HIGH (ref 10–20)
WBC # BLD: 6.2 K/UL — SIGNIFICANT CHANGE UP (ref 3.8–10.5)
WBC # FLD AUTO: 6.2 K/UL — SIGNIFICANT CHANGE UP (ref 3.8–10.5)

## 2017-10-26 PROCEDURE — 99233 SBSQ HOSP IP/OBS HIGH 50: CPT

## 2017-10-26 RX ORDER — HYDROMORPHONE HYDROCHLORIDE 2 MG/ML
0.25 INJECTION INTRAMUSCULAR; INTRAVENOUS; SUBCUTANEOUS DAILY
Qty: 0 | Refills: 0 | Status: DISCONTINUED | OUTPATIENT
Start: 2017-10-27 | End: 2017-11-03

## 2017-10-26 RX ORDER — DIAZEPAM 5 MG
5 TABLET ORAL AT BEDTIME
Qty: 0 | Refills: 0 | Status: DISCONTINUED | OUTPATIENT
Start: 2017-10-26 | End: 2017-11-02

## 2017-10-26 RX ORDER — VANCOMYCIN HCL 1 G
1000 VIAL (EA) INTRAVENOUS EVERY 12 HOURS
Qty: 0 | Refills: 0 | Status: DISCONTINUED | OUTPATIENT
Start: 2017-10-26 | End: 2017-10-29

## 2017-10-26 RX ORDER — ELECTROLYTE SOLUTION,INJ
1 VIAL (ML) INTRAVENOUS
Qty: 0 | Refills: 0 | Status: DISCONTINUED | OUTPATIENT
Start: 2017-10-26 | End: 2017-10-26

## 2017-10-26 RX ORDER — NYSTATIN CREAM 100000 [USP'U]/G
1 CREAM TOPICAL ONCE
Qty: 0 | Refills: 0 | Status: DISCONTINUED | OUTPATIENT
Start: 2017-10-26 | End: 2017-10-26

## 2017-10-26 RX ADMIN — Medication 650 MILLIGRAM(S): at 08:59

## 2017-10-26 RX ADMIN — ENOXAPARIN SODIUM 30 MILLIGRAM(S): 100 INJECTION SUBCUTANEOUS at 17:36

## 2017-10-26 RX ADMIN — PANTOPRAZOLE SODIUM 40 MILLIGRAM(S): 20 TABLET, DELAYED RELEASE ORAL at 05:57

## 2017-10-26 RX ADMIN — Medication 250 MILLIGRAM(S): at 15:17

## 2017-10-26 RX ADMIN — Medication 50 MILLIGRAM(S): at 22:02

## 2017-10-26 RX ADMIN — ONDANSETRON 4 MILLIGRAM(S): 8 TABLET, FILM COATED ORAL at 00:04

## 2017-10-26 RX ADMIN — LOSARTAN POTASSIUM 50 MILLIGRAM(S): 100 TABLET, FILM COATED ORAL at 05:58

## 2017-10-26 RX ADMIN — ENOXAPARIN SODIUM 30 MILLIGRAM(S): 100 INJECTION SUBCUTANEOUS at 05:58

## 2017-10-26 RX ADMIN — NYSTATIN CREAM 1 APPLICATION(S): 100000 CREAM TOPICAL at 06:03

## 2017-10-26 RX ADMIN — Medication 5 MILLIGRAM(S): at 22:02

## 2017-10-26 RX ADMIN — Medication 650 MILLIGRAM(S): at 17:37

## 2017-10-26 RX ADMIN — Medication 10 MILLIGRAM(S): at 05:58

## 2017-10-26 RX ADMIN — Medication 650 MILLIGRAM(S): at 18:37

## 2017-10-26 RX ADMIN — Medication 650 MILLIGRAM(S): at 09:59

## 2017-10-26 RX ADMIN — Medication 73 EACH: at 17:40

## 2017-10-26 RX ADMIN — Medication 650 MILLIGRAM(S): at 03:17

## 2017-10-26 RX ADMIN — HYDROMORPHONE HYDROCHLORIDE 0.25 MILLIGRAM(S): 2 INJECTION INTRAMUSCULAR; INTRAVENOUS; SUBCUTANEOUS at 10:08

## 2017-10-26 RX ADMIN — Medication 250 MILLIGRAM(S): at 03:02

## 2017-10-26 RX ADMIN — Medication 650 MILLIGRAM(S): at 04:30

## 2017-10-26 RX ADMIN — Medication 10 MILLIGRAM(S): at 17:37

## 2017-10-26 RX ADMIN — ESCITALOPRAM OXALATE 20 MILLIGRAM(S): 10 TABLET, FILM COATED ORAL at 22:02

## 2017-10-26 RX ADMIN — HYDROMORPHONE HYDROCHLORIDE 0.25 MILLIGRAM(S): 2 INJECTION INTRAMUSCULAR; INTRAVENOUS; SUBCUTANEOUS at 10:23

## 2017-10-26 RX ADMIN — NYSTATIN CREAM 1 APPLICATION(S): 100000 CREAM TOPICAL at 19:44

## 2017-10-26 RX ADMIN — Medication 1 APPLICATION(S): at 06:04

## 2017-10-26 RX ADMIN — ONDANSETRON 4 MILLIGRAM(S): 8 TABLET, FILM COATED ORAL at 19:41

## 2017-10-26 RX ADMIN — ONDANSETRON 4 MILLIGRAM(S): 8 TABLET, FILM COATED ORAL at 13:00

## 2017-10-26 RX ADMIN — ONDANSETRON 4 MILLIGRAM(S): 8 TABLET, FILM COATED ORAL at 06:04

## 2017-10-26 NOTE — PROGRESS NOTE BEHAVIORAL HEALTH - NSBHFUPINTERVALHXFT_PSY_A_CORE
Attempted to see patient at noon; she was starting work with PT.   and another visitor at bedside.    Patient seen again at 2pm; lights off, patient with eyes closed.  Reports she is doing well, excited that her daughter is newly pregnant.  Wants to nap, says "I like it dark."  I clarified with patient whether she would like ot continue being seen for therapy; she would like psychiatry to continue seeing her.    Patient seen 10/24 by psychology intern Mark Murdock for supportive psychotherapy.

## 2017-10-26 NOTE — PROGRESS NOTE BEHAVIORAL HEALTH - SUMMARY
57F reported history of anxiety on Lexapro, no prior psych history, PMH gastric bypass surgery with multiple complications, currently admitted for extended period of time due to fistulas, reported SI while frustrated on 10/5 evening, has since consistently denied.  Psychiatry and psychology following 2-3 times weekly for support.    Patient appears to have been engaging with PT more regularly recently.  Continues to be unengaged with psychotherapy, somewhat regressed, minimizes stress of hospitalization which may be effective coping strategy for her.

## 2017-10-26 NOTE — ADVANCED PRACTICE NURSE CONSULT - ASSESSMENT
Spoke with Dr Brady via telephone call regarding application of dressing to abdominal wound and fistulae. Per Dr Rodriguez's suggestion, Dr Brady requesting placement of sump tube into fistula connected to wall suction on continuous/low setting and reapply VAC dressing on continuous 75 mm/Hg continuous. Discussed the same with Toya RAZO.   Wound with pale red, non-granulating tissue; fistula in center of wound bed and left lower quadrant draining think light green effluent, fistula on left mid-quadrant draining green liquid effluent. Periwound skin denuded from 12-6 0'clock.   Applied stoma powder to denuded periwound skin and sealed with liquid skin barrier, thin silicone dressing to periwound and edges, Adaptic Touch to wound bed as contact layer, then black granufoam dressing. Sump tubes inserted into 2 fistulae on left quadrant and attached to wall suction on continuous/low, isolated fistula in center of wound bed with ostomy pouch. VAC placed on continuous at 75mm/Hg. Dressing and tubing assessed by Dr Rodriguez.

## 2017-10-26 NOTE — PROGRESS NOTE ADULT - SUBJECTIVE AND OBJECTIVE BOX
ON: Vac reenforced  10/25: Vac placed LI PLACED in vac placed to gravity  , VSS , BRAYDEN      57 F s/p  SBR, fistula resection, esophagojejunostomy revision w/ post-op course complicated by RTOR for distal anastomosis breakdown, ATN, acute respiratory failure, & septic shock, MRSA bacteremia which resolved.    NPO with sips and chips /TPN/IVF  Pain/nausea control - only benadryl at midnight and dilaudid 0.25mg during PT session and wound change only  ISS/OOB with PT daily  Nystatin powder, vancomycin 1250 Q12 (10/11-11/8)  SCDs, lovenox, OOBA  Lines :  L left subclavian line   Wound care : fistula manager - change once/2 times a week unless severely leak.  Wounds: Midline xiphoid to pubis incision; DTI & stage 2 pressure ulcer noted. ON: Vac reinforced Vanc trough 21   10/25: Vac placed LI PLACED in vac placed to gravity  , VSS , BRAYDEN    OVERNIGHT EVENTS:  Vac reenforced  10/25: Vac placed LI PLACED in vac placed to gravity  , VSS , BRAYDEN      STATUS POST:    7/24: SBR resection, fistula resection, revision of esophagogegunostomy drain through esophageal hiatus in R mediastinum  7/29: Ex-lap, SB anastamosis in BP limb breakdown with succus throughout abdomen  8/1: abd washout, drainage of abscess, biologic mesh placement, closure of abdominal wall, vac and retention suture  8/7: abd washout, mesh removal, frozen abd noted, LLQ CHECO replaced with benson drain  8/10: leak noted from previous anastamosis site on L side, mid abdomen malecot drain placed in enterotomy, JPs x 2 placed, necrotic fascia debrided, vicryl mesh sutured to fascia circumferentially, xeroform over mesh then vac dressing placed	        SUBJECTIVE: Patient examined bedside. Patient reports her wound vac is leaking and she needs Dilaudid 0.25 mg for pain control.  Flatus: [X] YES [] NO             Bowel Movement: [X ] YES [ ] NO  Pain (0-10):            Pain Control Adequate: [X ] YES [ ] NO  Nausea: [ ] YES [X ] NO            Vomiting: [ ] YES [X ] NO  Diarrhea: [ ] YES [X ] NO         Constipation: [ ] YES [X ] NO     Chest Pain: [ ] YES [X ] NO    SOB:  [ ] YES [X ] NO    enoxaparin Injectable 30 milliGRAM(s) SubCutaneous two times a day  heparin  flush 100 Units/mL Injectable 100 Unit(s) IV Push every other day  losartan 50 milliGRAM(s) Oral daily  vancomycin  IVPB 1000 milliGRAM(s) IV Intermittent every 12 hours      Vital Signs Last 24 Hrs  T(C): 36.7 (26 Oct 2017 09:30), Max: 37.3 (25 Oct 2017 14:39)  T(F): 98 (26 Oct 2017 09:30), Max: 99.1 (25 Oct 2017 14:39)  HR: 80 (26 Oct 2017 09:30) (77 - 91)  BP: 148/79 (26 Oct 2017 09:30) (138/85 - 189/91)  BP(mean): --  RR: 17 (26 Oct 2017 09:30) (16 - 17)  SpO2: 97% (26 Oct 2017 09:30) (94% - 99%)  I&O's Detail    25 Oct 2017 07:01  -  26 Oct 2017 07:00  --------------------------------------------------------  IN:    Fat Emulsion 20%: 291.2 mL    TPN (Total Parenteral Nutrition): 1752 mL  Total IN: 2043.2 mL    OUT:    Drain: 10 mL    VAC (Vacuum Assisted Closure) System: 300 mL    Voided: 1550 mL  Total OUT: 1860 mL    Total NET: 183.2 mL          General: NAD, resting comfortably in bed  C/V: NSR  Pulm: Nonlabored breathing, no respiratory distress  Abd: soft, NT/ND.  Extrem: WWP, no edema, SCDs in place  Wound Vac leaking       LABS:                        9.8    6.2   )-----------( 278      ( 26 Oct 2017 07:23 )             32.8     10-26    138  |  100  |  28<H>  ----------------------------<  127<H>  3.9   |  25  |  0.50    Ca    9.8      26 Oct 2017 07:23  Phos  3.2     10-26  Mg     2.0     10-26    TPro  6.4  /  Alb  2.6<L>  /  TBili  0.6  /  DBili  x   /  AST  18  /  ALT  27  /  AlkPhos  252<H>  10-24

## 2017-10-27 LAB
ANION GAP SERPL CALC-SCNC: 11 MMOL/L — SIGNIFICANT CHANGE UP (ref 5–17)
BUN SERPL-MCNC: 30 MG/DL — HIGH (ref 7–23)
CALCIUM SERPL-MCNC: 9.8 MG/DL — SIGNIFICANT CHANGE UP (ref 8.4–10.5)
CHLORIDE SERPL-SCNC: 102 MMOL/L — SIGNIFICANT CHANGE UP (ref 96–108)
CO2 SERPL-SCNC: 28 MMOL/L — SIGNIFICANT CHANGE UP (ref 22–31)
CREAT SERPL-MCNC: 0.51 MG/DL — SIGNIFICANT CHANGE UP (ref 0.5–1.3)
GLUCOSE BLDC GLUCOMTR-MCNC: 104 MG/DL — HIGH (ref 70–99)
GLUCOSE BLDC GLUCOMTR-MCNC: 132 MG/DL — HIGH (ref 70–99)
GLUCOSE BLDC GLUCOMTR-MCNC: 139 MG/DL — HIGH (ref 70–99)
GLUCOSE SERPL-MCNC: 110 MG/DL — HIGH (ref 70–99)
HCT VFR BLD CALC: 31.5 % — LOW (ref 34.5–45)
HGB BLD-MCNC: 9.7 G/DL — LOW (ref 11.5–15.5)
MAGNESIUM SERPL-MCNC: 2 MG/DL — SIGNIFICANT CHANGE UP (ref 1.6–2.6)
MCHC RBC-ENTMCNC: 28.4 PG — SIGNIFICANT CHANGE UP (ref 27–34)
MCHC RBC-ENTMCNC: 30.8 G/DL — LOW (ref 32–36)
MCV RBC AUTO: 92.1 FL — SIGNIFICANT CHANGE UP (ref 80–100)
PHOSPHATE SERPL-MCNC: 3.6 MG/DL — SIGNIFICANT CHANGE UP (ref 2.5–4.5)
PLATELET # BLD AUTO: 282 K/UL — SIGNIFICANT CHANGE UP (ref 150–400)
POTASSIUM SERPL-MCNC: 3.9 MMOL/L — SIGNIFICANT CHANGE UP (ref 3.5–5.3)
POTASSIUM SERPL-SCNC: 3.9 MMOL/L — SIGNIFICANT CHANGE UP (ref 3.5–5.3)
PREALB SERPL-MCNC: 25 MG/DL — SIGNIFICANT CHANGE UP (ref 20–40)
RBC # BLD: 3.42 M/UL — LOW (ref 3.8–5.2)
RBC # FLD: 17.2 % — HIGH (ref 10.3–16.9)
SODIUM SERPL-SCNC: 141 MMOL/L — SIGNIFICANT CHANGE UP (ref 135–145)
VANCOMYCIN TROUGH SERPL-MCNC: 18.9 UG/ML — SIGNIFICANT CHANGE UP (ref 10–20)
WBC # BLD: 6.2 K/UL — SIGNIFICANT CHANGE UP (ref 3.8–10.5)
WBC # FLD AUTO: 6.2 K/UL — SIGNIFICANT CHANGE UP (ref 3.8–10.5)

## 2017-10-27 RX ORDER — ELECTROLYTE SOLUTION,INJ
1 VIAL (ML) INTRAVENOUS
Qty: 0 | Refills: 0 | Status: DISCONTINUED | OUTPATIENT
Start: 2017-10-27 | End: 2017-10-27

## 2017-10-27 RX ORDER — OCTREOTIDE ACETATE 200 UG/ML
450 INJECTION, SOLUTION INTRAVENOUS; SUBCUTANEOUS DAILY
Qty: 0 | Refills: 0 | Status: DISCONTINUED | OUTPATIENT
Start: 2017-10-27 | End: 2017-12-27

## 2017-10-27 RX ORDER — I.V. FAT EMULSION 20 G/100ML
50 EMULSION INTRAVENOUS ONCE
Qty: 0 | Refills: 0 | Status: COMPLETED | OUTPATIENT
Start: 2017-10-27 | End: 2017-10-27

## 2017-10-27 RX ADMIN — Medication 5 MILLIGRAM(S): at 21:25

## 2017-10-27 RX ADMIN — ENOXAPARIN SODIUM 30 MILLIGRAM(S): 100 INJECTION SUBCUTANEOUS at 17:47

## 2017-10-27 RX ADMIN — Medication 650 MILLIGRAM(S): at 06:11

## 2017-10-27 RX ADMIN — Medication 100 UNIT(S): at 11:56

## 2017-10-27 RX ADMIN — ESCITALOPRAM OXALATE 20 MILLIGRAM(S): 10 TABLET, FILM COATED ORAL at 21:25

## 2017-10-27 RX ADMIN — HYDROMORPHONE HYDROCHLORIDE 0.25 MILLIGRAM(S): 2 INJECTION INTRAMUSCULAR; INTRAVENOUS; SUBCUTANEOUS at 10:40

## 2017-10-27 RX ADMIN — Medication 650 MILLIGRAM(S): at 15:00

## 2017-10-27 RX ADMIN — Medication 1 EACH: at 17:47

## 2017-10-27 RX ADMIN — LOSARTAN POTASSIUM 50 MILLIGRAM(S): 100 TABLET, FILM COATED ORAL at 06:11

## 2017-10-27 RX ADMIN — PANTOPRAZOLE SODIUM 40 MILLIGRAM(S): 20 TABLET, DELAYED RELEASE ORAL at 06:11

## 2017-10-27 RX ADMIN — SODIUM CHLORIDE 10 MILLILITER(S): 9 INJECTION INTRAMUSCULAR; INTRAVENOUS; SUBCUTANEOUS at 21:46

## 2017-10-27 RX ADMIN — Medication 250 MILLIGRAM(S): at 02:18

## 2017-10-27 RX ADMIN — NYSTATIN CREAM 1 APPLICATION(S): 100000 CREAM TOPICAL at 17:47

## 2017-10-27 RX ADMIN — Medication 50 MILLIGRAM(S): at 21:45

## 2017-10-27 RX ADMIN — ENOXAPARIN SODIUM 30 MILLIGRAM(S): 100 INJECTION SUBCUTANEOUS at 06:11

## 2017-10-27 RX ADMIN — Medication 10 MILLIGRAM(S): at 17:47

## 2017-10-27 RX ADMIN — Medication 650 MILLIGRAM(S): at 01:04

## 2017-10-27 RX ADMIN — ONDANSETRON 4 MILLIGRAM(S): 8 TABLET, FILM COATED ORAL at 14:19

## 2017-10-27 RX ADMIN — ONDANSETRON 4 MILLIGRAM(S): 8 TABLET, FILM COATED ORAL at 02:18

## 2017-10-27 RX ADMIN — Medication 650 MILLIGRAM(S): at 14:25

## 2017-10-27 RX ADMIN — Medication 650 MILLIGRAM(S): at 00:15

## 2017-10-27 RX ADMIN — Medication 10 MILLIGRAM(S): at 06:11

## 2017-10-27 RX ADMIN — CALCITRIOL 1 MICROGRAM(S): 0.5 CAPSULE ORAL at 11:56

## 2017-10-27 RX ADMIN — HYDROMORPHONE HYDROCHLORIDE 0.25 MILLIGRAM(S): 2 INJECTION INTRAMUSCULAR; INTRAVENOUS; SUBCUTANEOUS at 10:25

## 2017-10-27 RX ADMIN — Medication 650 MILLIGRAM(S): at 07:00

## 2017-10-27 RX ADMIN — Medication 1 APPLICATION(S): at 06:11

## 2017-10-27 RX ADMIN — NYSTATIN CREAM 1 APPLICATION(S): 100000 CREAM TOPICAL at 06:10

## 2017-10-27 RX ADMIN — Medication 650 MILLIGRAM(S): at 22:00

## 2017-10-27 RX ADMIN — I.V. FAT EMULSION 20.83 GRAM(S): 20 EMULSION INTRAVENOUS at 21:24

## 2017-10-27 RX ADMIN — Medication 650 MILLIGRAM(S): at 21:25

## 2017-10-27 NOTE — PROGRESS NOTE ADULT - ASSESSMENT
57 F s/p  SBR, fistula resection, esophagojejunostomy revision w/ post-op course complicated by RTOR for distal anastomosis breakdown, ATN, acute respiratory failure, & septic shock, MRSA bacteremia which resolved.    NPO with sips and chips /TPN/IVF  Pain/nausea control - only benadryl at midnight and dilaudid 0.25mg during PT session and wound change only  ISS/OOB with PT daily  Nystatin powder, vancomycin 1250 Q12 (10/11-11/8)  SCDs, lovenox, OOBA  Lines :  L left subclavian line   Wound care : fistula manager - change once/2 times a week unless severely leak.  Wounds: Midline xiphoid to pubis incision; DTI & stage 2 pressure ulcer noted. 57 F s/p  SBR, fistula resection, esophagojejunostomy revision w/ post-op course complicated by RTOR for distal anastomosis breakdown, ATN, acute respiratory failure, & septic shock, MRSA bacteremia which resolved.    Increase TPN shailesh   NPO with sips and chips /TPN/IVF  Pain/nausea control - only benadryl at midnight and dilaudid 0.25mg during PT session and wound change only  ISS/OOB with PT daily  Nystatin powder, vancomycin 1250 Q12 (10/11-11/8)  SCDs, lovenox, OOBA  Lines :  L left subclavian line   Wound care : fistula manager - change once/2 times a week unless severely leak.  Wounds: Midline xiphoid to pubis incision; DTI & stage 2 pressure ulcer noted.

## 2017-10-27 NOTE — CHART NOTE - NSCHARTNOTEFT_GEN_A_CORE
Admitting Diagnosis:   Patient is a 57y old  Female who presents with a chief complaint of enterocutaneous fistula (24 Jul 2017 14:15)      PAST MEDICAL & SURGICAL HISTORY:  Reflux  Obesity  DVT (deep venous thrombosis): 2013 neck  Diverticulitis  Hypertension  Elective surgery: abodominal wall surgery  dec 2016  Elective surgery: NOV 2016 gastric bypass revision  Gastric bypass status for obesity: gastric sleeve, 6/2012  S/P breast biopsy: 2011, left  S/P colon resection: 2011  S/P knee surgery: repair 2009, 2011  right; left  Umbilical hernia: 2000  S/P appendectomy: 1975  S/P tonsillectomy: 1967    Current Nutrition Order:  NPO with Ice Chips/Sips of Water   TPN via PICC line:  312g D,  150g AA, 50g 20% lipids. Providing pt with 2160 kcal, 2.9g kg IBW protein, 1750 mL fluid. GIR = 2.9.    PO Intake: Good (%) [   ]  Fair (50-75%) [   ] Poor (<25%) [   ] N/A    GI Issues: Pt reports no GI distress at present.  Possible scope to see possibility of enteral feeds    Pain: Pain controlled.     Skin Integrity: fistula manager - change once/2 times a week unless severely leak. Midline xiphoid to pubis incision; DTI & stage 2 pressure ulcer noted.     Labs:   10-27    141  |  102  |  30<H>  ----------------------------<  110<H>  3.9   |  28  |  0.51    Ca    9.8      27 Oct 2017 07:42  Phos  3.6     10-27  Mg     2.0     10-27      CAPILLARY BLOOD GLUCOSE    POCT Blood Glucose.: 132 mg/dL (27 Oct 2017 12:32)  POCT Blood Glucose.: 104 mg/dL (27 Oct 2017 05:52)  POCT Blood Glucose.: 124 mg/dL (26 Oct 2017 23:56)  POCT Blood Glucose.: 143 mg/dL (26 Oct 2017 17:11)      Medications:  MEDICATIONS  (STANDING):  calcitriol Injectable 1 MICROGram(s) IV Push <User Schedule>  collagenase Ointment 1 Application(s) Topical daily  cyanocobalamin Injectable 1000 MICROGram(s) SubCutaneous every 7 days  dextrose 5%. 1000 milliLiter(s) (50 mL/Hr) IV Continuous <Continuous>  dextrose 50% Injectable 25 Gram(s) IV Push once  dextrose 50% Injectable 12.5 Gram(s) IV Push once  diazepam    Tablet 5 milliGRAM(s) Oral at bedtime  diphenhydrAMINE   Injectable 50 milliGRAM(s) IV Push at bedtime  enoxaparin Injectable 30 milliGRAM(s) SubCutaneous two times a day  escitalopram 20 milliGRAM(s) Oral daily  fat emulsion 20% IVPB 50 Gram(s) IV Intermittent once  heparin  flush 100 Units/mL Injectable 100 Unit(s) IV Push every other day  ibuprofen  Tablet 400 milliGRAM(s) Oral once  insulin lispro (HumaLOG) corrective regimen sliding scale   SubCutaneous every 6 hours  losartan 50 milliGRAM(s) Oral daily  nystatin Powder 1 Application(s) Topical two times a day  oxybutynin 10 milliGRAM(s) Oral two times a day  pantoprazole  Injectable 40 milliGRAM(s) IV Push daily  Parenteral Nutrition - Adult 1 Each (73 mL/Hr) TPN Continuous <Continuous>  Parenteral Nutrition - Adult 1 Each (83 mL/Hr) TPN Continuous <Continuous>  sodium chloride 0.9% lock flush 20 milliLiter(s) IV Push once  vancomycin  IVPB 1000 milliGRAM(s) IV Intermittent every 12 hours    MEDICATIONS  (PRN):  acetaminophen   Tablet. 650 milliGRAM(s) Oral every 6 hours PRN Moderate Pain (4 - 6)  HYDROmorphone  Injectable 0.25 milliGRAM(s) IV Push daily PRN Severe Pain (7 - 10)  ondansetron Injectable 4 milliGRAM(s) IV Push every 6 hours PRN Nausea and/or Vomiting  sodium chloride 0.9% lock flush 10 milliLiter(s) IV Push every 1 hour PRN After each medication administration  sodium chloride 0.9% lock flush 10 milliLiter(s) IV Push every 12 hours PRN Lumen of catheter NOT used    Weight:  New wt taken 10/17: 164lbs/74.6kg     Weight Change:   164lb (10/17)  219lb (8/1)  Indicates a 55lb wt loss in 2.5 months. (25% original BW)  Please continue to obtain wts for trending.     Estimated energy needs using 52 kg IBW:  increased needs per wound and pressure ulcer healing  30-35 kcal/kg (3444-9702 kcal).   1.8-2 g/kg ( g protein).  30-35 mL/kg (8468-1241 mL fluid).     Subjective: Pt discussed in rounds. Discussed increasing kcal in TPN order. Noted pre-alb WNL.  New TPN order in for 365g Dex, 160g AA and 50g 20% lipids (GIR: 3.4, 3g pro/kg IBW, 2347kcal). Denies GI distress. noted 25% wt loss since August. Please obtain recent wt to further trend and adjust nutrient needs accordingly.   Previous Nutrition Diagnosis: Increased nutrient needs RT increased demand for nutrients AEB wound and pressure ulcer healing   Active [  X]  Resolved [   ]    Goal: PT to meet >75% EER via tolerated route    Recommendations:  1) Continue on current PN order  2) Please continue to take standing weight for trending purposes  3) PO diet per MD discretion   4) Continue to check labs: triglycerides, BG & FS and adjust TPN per MD discretion  5) Check labs for signs of fat malabsorption, consider ADEK vitamin.    Education: Understands meeting nutrient needs via TPN.    Risk Level: High [ X  ] Moderate [   ] Low [   ]. Admitting Diagnosis:   Patient is a 57y old  Female who presents with a chief complaint of enterocutaneous fistula (24 Jul 2017 14:15)      PAST MEDICAL & SURGICAL HISTORY:  Reflux  Obesity  DVT (deep venous thrombosis): 2013 neck  Diverticulitis  Hypertension  Elective surgery: abodominal wall surgery  dec 2016  Elective surgery: NOV 2016 gastric bypass revision  Gastric bypass status for obesity: gastric sleeve, 6/2012  S/P breast biopsy: 2011, left  S/P colon resection: 2011  S/P knee surgery: repair 2009, 2011  right; left  Umbilical hernia: 2000  S/P appendectomy: 1975  S/P tonsillectomy: 1967    Current Nutrition Order:  NPO with Ice Chips/Sips of Water   TPN via PICC line:  312g D,  150g AA, 50g 20% lipids. Providing pt with 2160 kcal, 2.9g kg IBW protein, 1750 mL fluid. GIR = 2.9.    PO Intake: Good (%) [   ]  Fair (50-75%) [   ] Poor (<25%) [   ] N/A    GI Issues: Pt reports no GI distress at present.  Possible scope to see possibility of enteral feeds    Pain: Pain controlled.     Skin Integrity: fistula manager - change once/2 times a week unless severely leak. Midline xiphoid to pubis incision; DTI & stage 2 pressure ulcer noted.     Labs:   10-27    141  |  102  |  30<H>  ----------------------------<  110<H>  3.9   |  28  |  0.51    Ca    9.8      27 Oct 2017 07:42  Phos  3.6     10-27  Mg     2.0     10-27      CAPILLARY BLOOD GLUCOSE    POCT Blood Glucose.: 132 mg/dL (27 Oct 2017 12:32)  POCT Blood Glucose.: 104 mg/dL (27 Oct 2017 05:52)  POCT Blood Glucose.: 124 mg/dL (26 Oct 2017 23:56)  POCT Blood Glucose.: 143 mg/dL (26 Oct 2017 17:11)      Medications:  MEDICATIONS  (STANDING):  calcitriol Injectable 1 MICROGram(s) IV Push <User Schedule>  collagenase Ointment 1 Application(s) Topical daily  cyanocobalamin Injectable 1000 MICROGram(s) SubCutaneous every 7 days  dextrose 5%. 1000 milliLiter(s) (50 mL/Hr) IV Continuous <Continuous>  dextrose 50% Injectable 25 Gram(s) IV Push once  dextrose 50% Injectable 12.5 Gram(s) IV Push once  diazepam    Tablet 5 milliGRAM(s) Oral at bedtime  diphenhydrAMINE   Injectable 50 milliGRAM(s) IV Push at bedtime  enoxaparin Injectable 30 milliGRAM(s) SubCutaneous two times a day  escitalopram 20 milliGRAM(s) Oral daily  fat emulsion 20% IVPB 50 Gram(s) IV Intermittent once  heparin  flush 100 Units/mL Injectable 100 Unit(s) IV Push every other day  ibuprofen  Tablet 400 milliGRAM(s) Oral once  insulin lispro (HumaLOG) corrective regimen sliding scale   SubCutaneous every 6 hours  losartan 50 milliGRAM(s) Oral daily  nystatin Powder 1 Application(s) Topical two times a day  oxybutynin 10 milliGRAM(s) Oral two times a day  pantoprazole  Injectable 40 milliGRAM(s) IV Push daily  Parenteral Nutrition - Adult 1 Each (73 mL/Hr) TPN Continuous <Continuous>  Parenteral Nutrition - Adult 1 Each (83 mL/Hr) TPN Continuous <Continuous>  sodium chloride 0.9% lock flush 20 milliLiter(s) IV Push once  vancomycin  IVPB 1000 milliGRAM(s) IV Intermittent every 12 hours    MEDICATIONS  (PRN):  acetaminophen   Tablet. 650 milliGRAM(s) Oral every 6 hours PRN Moderate Pain (4 - 6)  HYDROmorphone  Injectable 0.25 milliGRAM(s) IV Push daily PRN Severe Pain (7 - 10)  ondansetron Injectable 4 milliGRAM(s) IV Push every 6 hours PRN Nausea and/or Vomiting  sodium chloride 0.9% lock flush 10 milliLiter(s) IV Push every 1 hour PRN After each medication administration  sodium chloride 0.9% lock flush 10 milliLiter(s) IV Push every 12 hours PRN Lumen of catheter NOT used    Weight:  New wt taken 10/17: 164lbs/74.6kg     Weight Change:   164lb (10/17)  219lb (8/1)  Indicates a 55lb wt loss in 2.5 months. (25% original BW)  Please continue to obtain wts for trending.     Estimated energy needs using 52 kg IBW:  increased needs per wound and pressure ulcer healing  30-35 kcal/kg (5714-1159 kcal).   1.8-2 g/kg ( g protein).  30-35 mL/kg (3236-7895 mL fluid).     Subjective: Pt discussed in rounds. Discussed increasing kcal in TPN order.  New TPN order in for 365g Dex, 160g AA and 50g 20% lipids (GIR: 3.4, 3g pro/kg IBW, 2347kcal). Denies GI distress. noted 25% wt loss since August. Please obtain recent wt to further trend and adjust nutrient needs accordingly.   Previous Nutrition Diagnosis: Increased nutrient needs RT increased demand for nutrients AEB wound and pressure ulcer healing   Active [  X]  Resolved [   ]    Goal: PT to meet >75% EER via tolerated route    Recommendations:  1) Continue on current PN order  2) Please continue to take standing weight for trending purposes  3) PO diet per MD discretion   4) Continue to check labs: triglycerides, BG & FS and adjust TPN per MD discretion  5) Check labs for signs of fat malabsorption, consider ADEK vitamin.    Education: Understands meeting nutrient needs via TPN.    Risk Level: High [ X  ] Moderate [   ] Low [   ]. Admitting Diagnosis:   Patient is a 57y old  Female who presents with a chief complaint of enterocutaneous fistula (24 Jul 2017 14:15)      PAST MEDICAL & SURGICAL HISTORY:  Reflux  Obesity  DVT (deep venous thrombosis): 2013 neck  Diverticulitis  Hypertension  Elective surgery: abodominal wall surgery  dec 2016  Elective surgery: NOV 2016 gastric bypass revision  Gastric bypass status for obesity: gastric sleeve, 6/2012  S/P breast biopsy: 2011, left  S/P colon resection: 2011  S/P knee surgery: repair 2009, 2011  right; left  Umbilical hernia: 2000  S/P appendectomy: 1975  S/P tonsillectomy: 1967    Current Nutrition Order:  NPO with Ice Chips/Sips of Water   TPN via PICC line:  312g D,  150g AA, 50g 20% lipids. Providing pt with 2160 kcal, 2.9g kg IBW protein, 1750 mL fluid. GIR = 2.9.    PO Intake: Good (%) [   ]  Fair (50-75%) [   ] Poor (<25%) [   ] N/A    GI Issues: Pt reports no GI distress at present.  Possible scope to see possibility of enteral feeds    Pain: Pain controlled.     Skin Integrity: fistula manager - change once/2 times a week unless severely leak. Midline xiphoid to pubis incision; DTI & stage 2 pressure ulcer noted.     Labs:   10-27    141  |  102  |  30<H>  ----------------------------<  110<H>  3.9   |  28  |  0.51    Ca    9.8      27 Oct 2017 07:42  Phos  3.6     10-27  Mg     2.0     10-27      CAPILLARY BLOOD GLUCOSE    POCT Blood Glucose.: 132 mg/dL (27 Oct 2017 12:32)  POCT Blood Glucose.: 104 mg/dL (27 Oct 2017 05:52)  POCT Blood Glucose.: 124 mg/dL (26 Oct 2017 23:56)  POCT Blood Glucose.: 143 mg/dL (26 Oct 2017 17:11)      Medications:  MEDICATIONS  (STANDING):  calcitriol Injectable 1 MICROGram(s) IV Push <User Schedule>  collagenase Ointment 1 Application(s) Topical daily  cyanocobalamin Injectable 1000 MICROGram(s) SubCutaneous every 7 days  dextrose 5%. 1000 milliLiter(s) (50 mL/Hr) IV Continuous <Continuous>  dextrose 50% Injectable 25 Gram(s) IV Push once  dextrose 50% Injectable 12.5 Gram(s) IV Push once  diazepam    Tablet 5 milliGRAM(s) Oral at bedtime  diphenhydrAMINE   Injectable 50 milliGRAM(s) IV Push at bedtime  enoxaparin Injectable 30 milliGRAM(s) SubCutaneous two times a day  escitalopram 20 milliGRAM(s) Oral daily  fat emulsion 20% IVPB 50 Gram(s) IV Intermittent once  heparin  flush 100 Units/mL Injectable 100 Unit(s) IV Push every other day  ibuprofen  Tablet 400 milliGRAM(s) Oral once  insulin lispro (HumaLOG) corrective regimen sliding scale   SubCutaneous every 6 hours  losartan 50 milliGRAM(s) Oral daily  nystatin Powder 1 Application(s) Topical two times a day  oxybutynin 10 milliGRAM(s) Oral two times a day  pantoprazole  Injectable 40 milliGRAM(s) IV Push daily  Parenteral Nutrition - Adult 1 Each (73 mL/Hr) TPN Continuous <Continuous>  Parenteral Nutrition - Adult 1 Each (83 mL/Hr) TPN Continuous <Continuous>  sodium chloride 0.9% lock flush 20 milliLiter(s) IV Push once  vancomycin  IVPB 1000 milliGRAM(s) IV Intermittent every 12 hours    MEDICATIONS  (PRN):  acetaminophen   Tablet. 650 milliGRAM(s) Oral every 6 hours PRN Moderate Pain (4 - 6)  HYDROmorphone  Injectable 0.25 milliGRAM(s) IV Push daily PRN Severe Pain (7 - 10)  ondansetron Injectable 4 milliGRAM(s) IV Push every 6 hours PRN Nausea and/or Vomiting  sodium chloride 0.9% lock flush 10 milliLiter(s) IV Push every 1 hour PRN After each medication administration  sodium chloride 0.9% lock flush 10 milliLiter(s) IV Push every 12 hours PRN Lumen of catheter NOT used    Weight:  New wt taken 10/17: 164lbs/74.6kg     Weight Change:   164lb (10/17)  219lb (8/1)  Indicates a 55lb wt loss in 2.5 months. (25% original BW)- However, suspect inaccuracy of documented wt from 8/1 2/2 Wt being relatively stable over last year (see below)  March 2016: 89kg  February 2017: 74kg   July 2017: 76kg  Please continue to obtain wts for trending.     Estimated energy needs using 52 kg IBW:  increased needs per wound and pressure ulcer healing  30-35 kcal/kg (8087-3944 kcal).   1.8-2 g/kg ( g protein).  30-35 mL/kg (9338-5355 mL fluid).     Subjective: Pt discussed in rounds. Discussed increasing kcal in TPN order.  New TPN order in for 365g Dex, 160g AA and 50g 20% lipids (GIR: 3.4, 3g pro/kg IBW, 2347kcal). Denies GI distress. noted 25% wt loss since August. Please obtain recent wt to further trend and adjust nutrient needs accordingly.     Previous Nutrition Diagnosis: Increased nutrient needs RT increased demand for nutrients AEB wound and pressure ulcer healing   Active [  X]  Resolved [   ]    Goal: PT to meet >75% EER via tolerated route    Recommendations:  1) Recommend 110g AA, 312g Dex, 50g 20% lipids 3-4x/day (2000kcal, 2.1g pro/kg IBW, GIR 2.19).  2) Please continue to take standing weight for trending purposes  3) PO diet per MD discretion   4) Continue to check labs: triglycerides, BG & FS and adjust TPN per MD discretion  5) Check labs for signs of fat malabsorption, consider ADEK vitamin.    Education: Understands meeting nutrient needs via TPN.    Risk Level: High [ X  ] Moderate [   ] Low [   ].

## 2017-10-27 NOTE — PROGRESS NOTE ADULT - SUBJECTIVE AND OBJECTIVE BOX
O/N: BRAYDEN, VSS    STATUS POST:  7/24: SBR resection, fistula resection, revision of esophagogegunostomy drain through esophageal hiatus in R mediastinum  7/29: Ex-lap, SB anastamosis in BP limb breakdown with succus throughout abdomen  8/1: abd washout, drainage of abscess, biologic mesh placement, closure of abdominal wall, vac and retention suture  8/7: abd washout, mesh removal, frozen abd noted, LLQ CHECO replaced with benson drain  8/10: leak noted from previous anastamosis site on L side, mid abdomen malecot drain placed in enterotomy, JPs x 2 placed, necrotic fascia debrided, vicryl mesh sutured to fascia circumferentially, xeroform over mesh then vac dressing placed O/N: BRAYDEN, VSS    STATUS POST:  7/24: SBR resection, fistula resection, revision of esophagogegunostomy drain through esophageal hiatus in R mediastinum  7/29: Ex-lap, SB anastamosis in BP limb breakdown with succus throughout abdomen  8/1: abd washout, drainage of abscess, biologic mesh placement, closure of abdominal wall, vac and retention suture  8/7: abd washout, mesh removal, frozen abd noted, LLQ CHECO replaced with benson drain  8/10: leak noted from previous anastamosis site on L side, mid abdomen malecot drain placed in enterotomy, JPs x 2 placed, necrotic fascia debrided, vicryl mesh sutured to fascia circumferentially, xeroform over mesh then vac dressing placed	     SUBJECTIVE: Patient seen and examined bedside by chief resident. VAC changed, No nausea/vomting.     enoxaparin Injectable 30 milliGRAM(s) SubCutaneous two times a day  heparin  flush 100 Units/mL Injectable 100 Unit(s) IV Push every other day  losartan 50 milliGRAM(s) Oral daily  vancomycin  IVPB 1000 milliGRAM(s) IV Intermittent every 12 hours      Vital Signs Last 24 Hrs  T(C): 36.9 (27 Oct 2017 08:30), Max: 37 (26 Oct 2017 20:55)  T(F): 98.4 (27 Oct 2017 08:30), Max: 98.6 (26 Oct 2017 20:55)  HR: 70 (27 Oct 2017 08:30) (70 - 85)  BP: 111/74 (27 Oct 2017 08:30) (111/74 - 164/90)  BP(mean): --  RR: 16 (27 Oct 2017 08:30) (16 - 18)  SpO2: 93% (27 Oct 2017 08:30) (93% - 98%)  I&O's Detail    26 Oct 2017 07:01  -  27 Oct 2017 07:00  --------------------------------------------------------  IN:    TPN (Total Parenteral Nutrition): 1752 mL  Total IN: 1752 mL    OUT:    Drain: 50 mL    VAC (Vacuum Assisted Closure) System: 50 mL    Voided: 1750 mL  Total OUT: 1850 mL    Total NET: -98 mL          General: NAD, resting comfortably in bed  C/V: NSR  Pulm: Nonlabored breathing, no respiratory distress  Abd: soft, NT/ND. VAC was leaking, changed during  rounds  Extrem: WWP, no edema, SCDs in place        LABS:                        9.7    6.2   )-----------( 282      ( 27 Oct 2017 07:42 )             31.5     10-27    141  |  102  |  30<H>  ----------------------------<  110<H>  3.9   |  28  |  0.51    Ca    9.8      27 Oct 2017 07:42  Phos  3.6     10-27  Mg     2.0     10-27            RADIOLOGY & ADDITIONAL STUDIES:

## 2017-10-27 NOTE — PROGRESS NOTE ADULT - SUBJECTIVE AND OBJECTIVE BOX
Patient remains stable  will continue present management   dr aquino is covering next week  will have dr sotomayor try to scope through fistulas and see if distal feeding tube possible

## 2017-10-28 LAB
ANION GAP SERPL CALC-SCNC: 11 MMOL/L — SIGNIFICANT CHANGE UP (ref 5–17)
BUN SERPL-MCNC: 33 MG/DL — HIGH (ref 7–23)
CALCIUM SERPL-MCNC: 9.8 MG/DL — SIGNIFICANT CHANGE UP (ref 8.4–10.5)
CHLORIDE SERPL-SCNC: 102 MMOL/L — SIGNIFICANT CHANGE UP (ref 96–108)
CO2 SERPL-SCNC: 26 MMOL/L — SIGNIFICANT CHANGE UP (ref 22–31)
CREAT SERPL-MCNC: 0.52 MG/DL — SIGNIFICANT CHANGE UP (ref 0.5–1.3)
GLUCOSE BLDC GLUCOMTR-MCNC: 117 MG/DL — HIGH (ref 70–99)
GLUCOSE BLDC GLUCOMTR-MCNC: 128 MG/DL — HIGH (ref 70–99)
GLUCOSE BLDC GLUCOMTR-MCNC: 128 MG/DL — HIGH (ref 70–99)
GLUCOSE BLDC GLUCOMTR-MCNC: 136 MG/DL — HIGH (ref 70–99)
GLUCOSE SERPL-MCNC: 131 MG/DL — HIGH (ref 70–99)
HCT VFR BLD CALC: 34.5 % — SIGNIFICANT CHANGE UP (ref 34.5–45)
HGB BLD-MCNC: 10.5 G/DL — LOW (ref 11.5–15.5)
MAGNESIUM SERPL-MCNC: 2 MG/DL — SIGNIFICANT CHANGE UP (ref 1.6–2.6)
MCHC RBC-ENTMCNC: 28.8 PG — SIGNIFICANT CHANGE UP (ref 27–34)
MCHC RBC-ENTMCNC: 30.4 G/DL — LOW (ref 32–36)
MCV RBC AUTO: 94.5 FL — SIGNIFICANT CHANGE UP (ref 80–100)
PHOSPHATE SERPL-MCNC: 3.6 MG/DL — SIGNIFICANT CHANGE UP (ref 2.5–4.5)
PLATELET # BLD AUTO: 272 K/UL — SIGNIFICANT CHANGE UP (ref 150–400)
POTASSIUM SERPL-MCNC: 4.1 MMOL/L — SIGNIFICANT CHANGE UP (ref 3.5–5.3)
POTASSIUM SERPL-SCNC: 4.1 MMOL/L — SIGNIFICANT CHANGE UP (ref 3.5–5.3)
RBC # BLD: 3.65 M/UL — LOW (ref 3.8–5.2)
RBC # FLD: 17.1 % — HIGH (ref 10.3–16.9)
SODIUM SERPL-SCNC: 139 MMOL/L — SIGNIFICANT CHANGE UP (ref 135–145)
WBC # BLD: 7 K/UL — SIGNIFICANT CHANGE UP (ref 3.8–10.5)
WBC # FLD AUTO: 7 K/UL — SIGNIFICANT CHANGE UP (ref 3.8–10.5)

## 2017-10-28 RX ORDER — ELECTROLYTE SOLUTION,INJ
1 VIAL (ML) INTRAVENOUS
Qty: 0 | Refills: 0 | Status: DISCONTINUED | OUTPATIENT
Start: 2017-10-28 | End: 2017-10-28

## 2017-10-28 RX ADMIN — Medication 250 MILLIGRAM(S): at 15:20

## 2017-10-28 RX ADMIN — ENOXAPARIN SODIUM 30 MILLIGRAM(S): 100 INJECTION SUBCUTANEOUS at 06:50

## 2017-10-28 RX ADMIN — NYSTATIN CREAM 1 APPLICATION(S): 100000 CREAM TOPICAL at 18:52

## 2017-10-28 RX ADMIN — ENOXAPARIN SODIUM 30 MILLIGRAM(S): 100 INJECTION SUBCUTANEOUS at 18:58

## 2017-10-28 RX ADMIN — Medication 10 MILLIGRAM(S): at 18:52

## 2017-10-28 RX ADMIN — LOSARTAN POTASSIUM 50 MILLIGRAM(S): 100 TABLET, FILM COATED ORAL at 06:49

## 2017-10-28 RX ADMIN — Medication 5 MILLIGRAM(S): at 21:33

## 2017-10-28 RX ADMIN — Medication 1 EACH: at 18:52

## 2017-10-28 RX ADMIN — ONDANSETRON 4 MILLIGRAM(S): 8 TABLET, FILM COATED ORAL at 13:33

## 2017-10-28 RX ADMIN — Medication 1 APPLICATION(S): at 06:50

## 2017-10-28 RX ADMIN — ONDANSETRON 4 MILLIGRAM(S): 8 TABLET, FILM COATED ORAL at 07:25

## 2017-10-28 RX ADMIN — Medication 650 MILLIGRAM(S): at 11:00

## 2017-10-28 RX ADMIN — Medication 650 MILLIGRAM(S): at 03:00

## 2017-10-28 RX ADMIN — ONDANSETRON 4 MILLIGRAM(S): 8 TABLET, FILM COATED ORAL at 21:33

## 2017-10-28 RX ADMIN — Medication 650 MILLIGRAM(S): at 20:00

## 2017-10-28 RX ADMIN — HYDROMORPHONE HYDROCHLORIDE 0.25 MILLIGRAM(S): 2 INJECTION INTRAMUSCULAR; INTRAVENOUS; SUBCUTANEOUS at 20:30

## 2017-10-28 RX ADMIN — ESCITALOPRAM OXALATE 20 MILLIGRAM(S): 10 TABLET, FILM COATED ORAL at 21:34

## 2017-10-28 RX ADMIN — Medication 650 MILLIGRAM(S): at 18:51

## 2017-10-28 RX ADMIN — SODIUM CHLORIDE 10 MILLILITER(S): 9 INJECTION INTRAMUSCULAR; INTRAVENOUS; SUBCUTANEOUS at 21:34

## 2017-10-28 RX ADMIN — Medication 250 MILLIGRAM(S): at 03:00

## 2017-10-28 RX ADMIN — PANTOPRAZOLE SODIUM 40 MILLIGRAM(S): 20 TABLET, DELAYED RELEASE ORAL at 06:50

## 2017-10-28 RX ADMIN — HYDROMORPHONE HYDROCHLORIDE 0.25 MILLIGRAM(S): 2 INJECTION INTRAMUSCULAR; INTRAVENOUS; SUBCUTANEOUS at 20:02

## 2017-10-28 RX ADMIN — NYSTATIN CREAM 1 APPLICATION(S): 100000 CREAM TOPICAL at 06:50

## 2017-10-28 RX ADMIN — Medication 10 MILLIGRAM(S): at 06:49

## 2017-10-28 RX ADMIN — Medication 650 MILLIGRAM(S): at 03:30

## 2017-10-28 RX ADMIN — Medication 50 MILLIGRAM(S): at 22:00

## 2017-10-28 RX ADMIN — Medication 650 MILLIGRAM(S): at 10:11

## 2017-10-28 NOTE — PROGRESS NOTE ADULT - SUBJECTIVE AND OBJECTIVE BOX
On interval followup, the left subclavian venous catheter site is clean, dry and non-inflamed. T 99 range. Notes, cultures and plans are followed.

## 2017-10-28 NOTE — PROGRESS NOTE ADULT - SUBJECTIVE AND OBJECTIVE BOX
ON Vac re-enforced.   10/27: changed VAC during  rounds. Lizeth OWEN trough : 18.9, cont current dose ON Vac re-enforced.   10/27: changed VAC during  rounds. Lizeth OWEN trough : 18.9, cont current dose     SUBJECTIVE: Patient seen and examined bedside by chief resident. VAC is leaking, no nausea/vomiting.     enoxaparin Injectable 30 milliGRAM(s) SubCutaneous two times a day  heparin  flush 100 Units/mL Injectable 100 Unit(s) IV Push every other day  losartan 50 milliGRAM(s) Oral daily  vancomycin  IVPB 1000 milliGRAM(s) IV Intermittent every 12 hours      Vital Signs Last 24 Hrs  T(C): 37.1 (28 Oct 2017 09:11), Max: 37.1 (28 Oct 2017 09:11)  T(F): 98.8 (28 Oct 2017 09:11), Max: 98.8 (28 Oct 2017 09:11)  HR: 86 (28 Oct 2017 09:11) (66 - 86)  BP: 159/95 (28 Oct 2017 09:11) (126/81 - 164/93)  BP(mean): --  RR: 17 (28 Oct 2017 09:11) (16 - 17)  SpO2: 94% (28 Oct 2017 09:11) (90% - 95%)  I&O's Detail    27 Oct 2017 07:01  -  28 Oct 2017 07:00  --------------------------------------------------------  IN:    Fat Emulsion 20%: 249.6 mL    Solution: 500 mL    TPN (Total Parenteral Nutrition): 1872 mL  Total IN: 2621.6 mL    OUT:    VAC (Vacuum Assisted Closure) System: 210 mL  Total OUT: 210 mL    Total NET: 2411.6 mL          General: NAD, resting comfortably in bed  C/V: NSR  Pulm: Nonlabored breathing, no respiratory distress  Abd: soft, NT/ND. VAC leaking  Extrem: WWP, no edema, SCDs in place        LABS:                        10.5   7.0   )-----------( 272      ( 28 Oct 2017 08:33 )             34.5     10-28    139  |  102  |  33<H>  ----------------------------<  131<H>  4.1   |  26  |  0.52    Ca    9.8      28 Oct 2017 08:33  Phos  3.6     10-28  Mg     2.0     10-28            RADIOLOGY & ADDITIONAL STUDIES:

## 2017-10-29 LAB
ANION GAP SERPL CALC-SCNC: 17 MMOL/L — SIGNIFICANT CHANGE UP (ref 5–17)
BUN SERPL-MCNC: 36 MG/DL — HIGH (ref 7–23)
CALCIUM SERPL-MCNC: 9.8 MG/DL — SIGNIFICANT CHANGE UP (ref 8.4–10.5)
CHLORIDE SERPL-SCNC: 101 MMOL/L — SIGNIFICANT CHANGE UP (ref 96–108)
CO2 SERPL-SCNC: 23 MMOL/L — SIGNIFICANT CHANGE UP (ref 22–31)
CREAT SERPL-MCNC: 0.55 MG/DL — SIGNIFICANT CHANGE UP (ref 0.5–1.3)
GLUCOSE BLDC GLUCOMTR-MCNC: 117 MG/DL — HIGH (ref 70–99)
GLUCOSE BLDC GLUCOMTR-MCNC: 122 MG/DL — HIGH (ref 70–99)
GLUCOSE BLDC GLUCOMTR-MCNC: 135 MG/DL — HIGH (ref 70–99)
GLUCOSE BLDC GLUCOMTR-MCNC: 91 MG/DL — SIGNIFICANT CHANGE UP (ref 70–99)
GLUCOSE SERPL-MCNC: 104 MG/DL — HIGH (ref 70–99)
HCT VFR BLD CALC: 32.8 % — LOW (ref 34.5–45)
HGB BLD-MCNC: 10.1 G/DL — LOW (ref 11.5–15.5)
MAGNESIUM SERPL-MCNC: 2 MG/DL — SIGNIFICANT CHANGE UP (ref 1.6–2.6)
MCHC RBC-ENTMCNC: 28.9 PG — SIGNIFICANT CHANGE UP (ref 27–34)
MCHC RBC-ENTMCNC: 30.8 G/DL — LOW (ref 32–36)
MCV RBC AUTO: 93.7 FL — SIGNIFICANT CHANGE UP (ref 80–100)
PHOSPHATE SERPL-MCNC: 3.4 MG/DL — SIGNIFICANT CHANGE UP (ref 2.5–4.5)
PLATELET # BLD AUTO: 308 K/UL — SIGNIFICANT CHANGE UP (ref 150–400)
POTASSIUM SERPL-MCNC: 4.1 MMOL/L — SIGNIFICANT CHANGE UP (ref 3.5–5.3)
POTASSIUM SERPL-SCNC: 4.1 MMOL/L — SIGNIFICANT CHANGE UP (ref 3.5–5.3)
RBC # BLD: 3.5 M/UL — LOW (ref 3.8–5.2)
RBC # FLD: 17.3 % — HIGH (ref 10.3–16.9)
SODIUM SERPL-SCNC: 141 MMOL/L — SIGNIFICANT CHANGE UP (ref 135–145)
VANCOMYCIN TROUGH SERPL-MCNC: 21.9 UG/ML — HIGH (ref 10–20)
WBC # BLD: 6.4 K/UL — SIGNIFICANT CHANGE UP (ref 3.8–10.5)
WBC # FLD AUTO: 6.4 K/UL — SIGNIFICANT CHANGE UP (ref 3.8–10.5)

## 2017-10-29 RX ORDER — VANCOMYCIN HCL 1 G
750 VIAL (EA) INTRAVENOUS EVERY 12 HOURS
Qty: 0 | Refills: 0 | Status: DISCONTINUED | OUTPATIENT
Start: 2017-10-30 | End: 2017-11-08

## 2017-10-29 RX ORDER — VANCOMYCIN HCL 1 G
750 VIAL (EA) INTRAVENOUS ONCE
Qty: 0 | Refills: 0 | Status: COMPLETED | OUTPATIENT
Start: 2017-10-29 | End: 2017-10-29

## 2017-10-29 RX ORDER — ELECTROLYTE SOLUTION,INJ
1 VIAL (ML) INTRAVENOUS
Qty: 0 | Refills: 0 | Status: DISCONTINUED | OUTPATIENT
Start: 2017-10-29 | End: 2017-10-29

## 2017-10-29 RX ORDER — VANCOMYCIN HCL 1 G
VIAL (EA) INTRAVENOUS
Qty: 0 | Refills: 0 | Status: DISCONTINUED | OUTPATIENT
Start: 2017-10-29 | End: 2017-11-08

## 2017-10-29 RX ORDER — I.V. FAT EMULSION 20 G/100ML
50 EMULSION INTRAVENOUS
Qty: 0 | Refills: 0 | Status: DISCONTINUED | OUTPATIENT
Start: 2017-10-29 | End: 2017-10-29

## 2017-10-29 RX ORDER — HYDROCORTISONE 1 %
1 OINTMENT (GRAM) TOPICAL THREE TIMES A DAY
Qty: 0 | Refills: 0 | Status: COMPLETED | OUTPATIENT
Start: 2017-10-29 | End: 2017-10-30

## 2017-10-29 RX ADMIN — NYSTATIN CREAM 1 APPLICATION(S): 100000 CREAM TOPICAL at 17:44

## 2017-10-29 RX ADMIN — ONDANSETRON 4 MILLIGRAM(S): 8 TABLET, FILM COATED ORAL at 12:37

## 2017-10-29 RX ADMIN — ENOXAPARIN SODIUM 30 MILLIGRAM(S): 100 INJECTION SUBCUTANEOUS at 05:32

## 2017-10-29 RX ADMIN — NYSTATIN CREAM 1 APPLICATION(S): 100000 CREAM TOPICAL at 05:33

## 2017-10-29 RX ADMIN — Medication 650 MILLIGRAM(S): at 21:45

## 2017-10-29 RX ADMIN — Medication 650 MILLIGRAM(S): at 04:11

## 2017-10-29 RX ADMIN — Medication 1 APPLICATION(S): at 14:23

## 2017-10-29 RX ADMIN — Medication 10 MILLIGRAM(S): at 18:38

## 2017-10-29 RX ADMIN — Medication 650 MILLIGRAM(S): at 03:14

## 2017-10-29 RX ADMIN — Medication 250 MILLIGRAM(S): at 03:14

## 2017-10-29 RX ADMIN — LOSARTAN POTASSIUM 50 MILLIGRAM(S): 100 TABLET, FILM COATED ORAL at 05:32

## 2017-10-29 RX ADMIN — Medication 650 MILLIGRAM(S): at 22:25

## 2017-10-29 RX ADMIN — Medication 1 APPLICATION(S): at 21:54

## 2017-10-29 RX ADMIN — PANTOPRAZOLE SODIUM 40 MILLIGRAM(S): 20 TABLET, DELAYED RELEASE ORAL at 05:33

## 2017-10-29 RX ADMIN — ENOXAPARIN SODIUM 30 MILLIGRAM(S): 100 INJECTION SUBCUTANEOUS at 17:43

## 2017-10-29 RX ADMIN — I.V. FAT EMULSION 20.83 GRAM(S): 20 EMULSION INTRAVENOUS at 21:44

## 2017-10-29 RX ADMIN — Medication 100 UNIT(S): at 12:37

## 2017-10-29 RX ADMIN — Medication 50 MILLIGRAM(S): at 21:44

## 2017-10-29 RX ADMIN — Medication 1 APPLICATION(S): at 05:33

## 2017-10-29 RX ADMIN — ESCITALOPRAM OXALATE 20 MILLIGRAM(S): 10 TABLET, FILM COATED ORAL at 21:44

## 2017-10-29 RX ADMIN — ONDANSETRON 4 MILLIGRAM(S): 8 TABLET, FILM COATED ORAL at 05:40

## 2017-10-29 RX ADMIN — Medication 5 MILLIGRAM(S): at 21:44

## 2017-10-29 RX ADMIN — Medication 1 EACH: at 17:42

## 2017-10-29 RX ADMIN — Medication 150 MILLIGRAM(S): at 17:59

## 2017-10-29 RX ADMIN — Medication 650 MILLIGRAM(S): at 17:40

## 2017-10-29 RX ADMIN — Medication 650 MILLIGRAM(S): at 10:40

## 2017-10-29 RX ADMIN — Medication 650 MILLIGRAM(S): at 09:35

## 2017-10-29 RX ADMIN — Medication 10 MILLIGRAM(S): at 05:32

## 2017-10-29 RX ADMIN — SODIUM CHLORIDE 10 MILLILITER(S): 9 INJECTION INTRAMUSCULAR; INTRAVENOUS; SUBCUTANEOUS at 05:42

## 2017-10-29 RX ADMIN — ONDANSETRON 4 MILLIGRAM(S): 8 TABLET, FILM COATED ORAL at 18:38

## 2017-10-29 RX ADMIN — Medication 650 MILLIGRAM(S): at 16:44

## 2017-10-29 NOTE — PROGRESS NOTE ADULT - SUBJECTIVE AND OBJECTIVE BOX
ON : BRAYDEN  10/28 : VAC changed, BRAYDEN, TPN ordered. pain management consultation ordered (will be seen on Monday, not emergency for the weekend)    S/P :  7/24: SBR resection, fistula resection, revision of esophagogegunostomy drain through esophageal hiatus in R mediastinum  7/29: Ex-lap, SB anastamosis in BP limb breakdown with succus throughout abdomen  8/1: abd washout, drainage of abscess, biologic mesh placement, closure of abdominal wall, vac and retention suture  8/7: abd washout, mesh removal, frozen abd noted, LLQ CHECO replaced with benson drain  8/10: leak noted from previous anastamosis site on L side, mid abdomen malecot drain placed in enterotomy, JPs x 2 placed, necrotic fascia debrided, vicryl mesh sutured to fascia circumferentially, xeroform over mesh then vac dressing placed ON : BRAYDEN  10/28 : VAC changed, BRAYDEN, TPN ordered. pain management consultation ordered (will be seen on Monday, not emergency for the weekend)    S/P :  7/24: SBR resection, fistula resection, revision of esophagogegunostomy drain through esophageal hiatus in R mediastinum  7/29: Ex-lap, SB anastamosis in BP limb breakdown with succus throughout abdomen  8/1: abd washout, drainage of abscess, biologic mesh placement, closure of abdominal wall, vac and retention suture  8/7: abd washout, mesh removal, frozen abd noted, LLQ CHECO replaced with benson drain  8/10: leak noted from previous anastamosis site on L side, mid abdomen malecot drain placed in enterotomy, JPs x 2 placed, necrotic fascia debrided, vicryl mesh sutured to fascia circumferentially, xeroform over mesh then vac dressing placed    Patient seen and examined bedside with chief resident. Patient has no complaints. Patient continues to pick at wound vac. Patient denies chest pain, shortness of breath, nausea, vomiting, fever,and chills.    losartan 50 milliGRAM(s) Oral daily  vancomycin  IVPB 1000 milliGRAM(s) IV Intermittent every 12 hours      Vital Signs Last 24 Hrs  T(C): 37.1 (29 Oct 2017 12:46), Max: 37.2 (29 Oct 2017 05:32)  T(F): 98.8 (29 Oct 2017 12:46), Max: 98.9 (29 Oct 2017 05:32)  HR: 74 (29 Oct 2017 12:46) (74 - 87)  BP: 127/85 (29 Oct 2017 12:46) (127/85 - 150/89)  BP(mean): --  RR: 17 (29 Oct 2017 12:46) (16 - 17)  SpO2: 93% (29 Oct 2017 12:46) (93% - 95%)    I&O's Detail    28 Oct 2017 07:01  -  29 Oct 2017 07:00  --------------------------------------------------------  IN:    Solution: 250 mL    TPN (Total Parenteral Nutrition): 996 mL  Total IN: 1246 mL    OUT:    Drain: 150 mL    VAC (Vacuum Assisted Closure) System: 90 mL    Voided: 600 mL  Total OUT: 840 mL    Total NET: 406 mL      29 Oct 2017 07:01  -  29 Oct 2017 14:21  --------------------------------------------------------  IN:  Total IN: 0 mL    OUT:    VAC (Vacuum Assisted Closure) System: 25 mL  Total OUT: 25 mL    Total NET: -25 mL        PHYSICAL EXAM:      Constitutional: NAD. resting comfortably in bed.    Gastrointestinal: soft. NT.ND. wound vac in place    Extremities: no edema. SCDs in place.

## 2017-10-29 NOTE — PROGRESS NOTE ADULT - ASSESSMENT
57 F s/p  SBR, fistula resection, esophagojejunostomy revision w/ post-op course complicated by RTOR for distal anastomosis breakdown, ATN, acute respiratory failure, & septic shock, MRSA bacteremia which resolved. Currently for wound care management.     Plan:  NPO with sips and chips /TPN/IVF  Pain/nausea control - only benadryl at midnight and dilaudid 0.25mg during PT session  and wound change only  Daily wound VAC change   ISS/OOB with PT daily  Nystatin powder, vancomycin 1250 Q12 (10/11-11/8)  SCDs, lovenox, OOBA  Lines :  L left subclavian line (10/4-- )  AM labs, weekly albumin, pre-albumin and triglyceride (every friday)

## 2017-10-30 LAB
ANION GAP SERPL CALC-SCNC: 12 MMOL/L — SIGNIFICANT CHANGE UP (ref 5–17)
BUN SERPL-MCNC: 34 MG/DL — HIGH (ref 7–23)
CALCIUM SERPL-MCNC: 9.7 MG/DL — SIGNIFICANT CHANGE UP (ref 8.4–10.5)
CHLORIDE SERPL-SCNC: 101 MMOL/L — SIGNIFICANT CHANGE UP (ref 96–108)
CO2 SERPL-SCNC: 25 MMOL/L — SIGNIFICANT CHANGE UP (ref 22–31)
CREAT SERPL-MCNC: 0.5 MG/DL — SIGNIFICANT CHANGE UP (ref 0.5–1.3)
GLUCOSE BLDC GLUCOMTR-MCNC: 119 MG/DL — HIGH (ref 70–99)
GLUCOSE BLDC GLUCOMTR-MCNC: 119 MG/DL — HIGH (ref 70–99)
GLUCOSE BLDC GLUCOMTR-MCNC: 125 MG/DL — HIGH (ref 70–99)
GLUCOSE BLDC GLUCOMTR-MCNC: 146 MG/DL — HIGH (ref 70–99)
GLUCOSE SERPL-MCNC: 135 MG/DL — HIGH (ref 70–99)
HCT VFR BLD CALC: 31.3 % — LOW (ref 34.5–45)
HGB BLD-MCNC: 9.8 G/DL — LOW (ref 11.5–15.5)
MAGNESIUM SERPL-MCNC: 2 MG/DL — SIGNIFICANT CHANGE UP (ref 1.6–2.6)
MCHC RBC-ENTMCNC: 28.7 PG — SIGNIFICANT CHANGE UP (ref 27–34)
MCHC RBC-ENTMCNC: 31.3 G/DL — LOW (ref 32–36)
MCV RBC AUTO: 91.5 FL — SIGNIFICANT CHANGE UP (ref 80–100)
PHOSPHATE SERPL-MCNC: 3.2 MG/DL — SIGNIFICANT CHANGE UP (ref 2.5–4.5)
PLATELET # BLD AUTO: 264 K/UL — SIGNIFICANT CHANGE UP (ref 150–400)
POTASSIUM SERPL-MCNC: 4 MMOL/L — SIGNIFICANT CHANGE UP (ref 3.5–5.3)
POTASSIUM SERPL-SCNC: 4 MMOL/L — SIGNIFICANT CHANGE UP (ref 3.5–5.3)
RBC # BLD: 3.42 M/UL — LOW (ref 3.8–5.2)
RBC # FLD: 17.2 % — HIGH (ref 10.3–16.9)
SODIUM SERPL-SCNC: 138 MMOL/L — SIGNIFICANT CHANGE UP (ref 135–145)
WBC # BLD: 8.4 K/UL — SIGNIFICANT CHANGE UP (ref 3.8–10.5)
WBC # FLD AUTO: 8.4 K/UL — SIGNIFICANT CHANGE UP (ref 3.8–10.5)

## 2017-10-30 RX ORDER — ELECTROLYTE SOLUTION,INJ
1 VIAL (ML) INTRAVENOUS
Qty: 0 | Refills: 0 | Status: DISCONTINUED | OUTPATIENT
Start: 2017-10-30 | End: 2017-10-30

## 2017-10-30 RX ADMIN — ESCITALOPRAM OXALATE 20 MILLIGRAM(S): 10 TABLET, FILM COATED ORAL at 21:15

## 2017-10-30 RX ADMIN — CALCITRIOL 1 MICROGRAM(S): 0.5 CAPSULE ORAL at 16:41

## 2017-10-30 RX ADMIN — Medication 150 MILLIGRAM(S): at 17:58

## 2017-10-30 RX ADMIN — NYSTATIN CREAM 1 APPLICATION(S): 100000 CREAM TOPICAL at 17:59

## 2017-10-30 RX ADMIN — ONDANSETRON 4 MILLIGRAM(S): 8 TABLET, FILM COATED ORAL at 18:15

## 2017-10-30 RX ADMIN — Medication 10 MILLIGRAM(S): at 17:58

## 2017-10-30 RX ADMIN — Medication 650 MILLIGRAM(S): at 18:14

## 2017-10-30 RX ADMIN — PANTOPRAZOLE SODIUM 40 MILLIGRAM(S): 20 TABLET, DELAYED RELEASE ORAL at 05:05

## 2017-10-30 RX ADMIN — Medication 10 MILLIGRAM(S): at 05:05

## 2017-10-30 RX ADMIN — NYSTATIN CREAM 1 APPLICATION(S): 100000 CREAM TOPICAL at 05:06

## 2017-10-30 RX ADMIN — ENOXAPARIN SODIUM 30 MILLIGRAM(S): 100 INJECTION SUBCUTANEOUS at 05:05

## 2017-10-30 RX ADMIN — Medication 50 MILLIGRAM(S): at 21:15

## 2017-10-30 RX ADMIN — Medication 150 MILLIGRAM(S): at 05:05

## 2017-10-30 RX ADMIN — Medication 650 MILLIGRAM(S): at 19:14

## 2017-10-30 RX ADMIN — ONDANSETRON 4 MILLIGRAM(S): 8 TABLET, FILM COATED ORAL at 11:34

## 2017-10-30 RX ADMIN — Medication 5 MILLIGRAM(S): at 21:15

## 2017-10-30 RX ADMIN — ONDANSETRON 4 MILLIGRAM(S): 8 TABLET, FILM COATED ORAL at 02:35

## 2017-10-30 RX ADMIN — Medication 650 MILLIGRAM(S): at 05:05

## 2017-10-30 RX ADMIN — LOSARTAN POTASSIUM 50 MILLIGRAM(S): 100 TABLET, FILM COATED ORAL at 05:05

## 2017-10-30 RX ADMIN — Medication 1 APPLICATION(S): at 05:05

## 2017-10-30 RX ADMIN — Medication 1 APPLICATION(S): at 05:04

## 2017-10-30 RX ADMIN — Medication 1 EACH: at 17:57

## 2017-10-30 RX ADMIN — Medication 650 MILLIGRAM(S): at 05:41

## 2017-10-30 RX ADMIN — ENOXAPARIN SODIUM 30 MILLIGRAM(S): 100 INJECTION SUBCUTANEOUS at 17:58

## 2017-10-30 NOTE — PROGRESS NOTE ADULT - SUBJECTIVE AND OBJECTIVE BOX
O/N: BRAYDEN, VSS  10/29: vac reinforced, hydrocortizone cream ordered, vanco trough 21.8 dose decreased to 750mg q12    STATUS POST:  7/24: SBR resection, fistula resection, revision of esophagogegunostomy drain through esophageal hiatus in R mediastinum  7/29: Ex-lap, SB anastamosis in BP limb breakdown with succus throughout abdomen  8/1: abd washout, drainage of abscess, biologic mesh placement, closure of abdominal wall, vac and retention suture  8/7: abd washout, mesh removal, frozen abd noted, LLQ CHECO replaced with benson drain  8/10: leak noted from previous anastamosis site on L side, mid abdomen malecot drain placed in enterotomy, JPs x 2 placed, necrotic fascia debrided, vicryl mesh sutured to fascia circumferentially, xeroform over mesh then vac dressing placed O/N: BRAYDEN, VSS  10/29: vac reinforced, hydrocortizone cream ordered, vanco trough 21.8 dose decreased to 750mg q12    STATUS POST:  7/24: SBR resection, fistula resection, revision of esophagogegunostomy drain through esophageal hiatus in R mediastinum  7/29: Ex-lap, SB anastamosis in BP limb breakdown with succus throughout abdomen  8/1: abd washout, drainage of abscess, biologic mesh placement, closure of abdominal wall, vac and retention suture  8/7: abd washout, mesh removal, frozen abd noted, LLQ CHECO replaced with benson drain  8/10: leak noted from previous anastamosis site on L side, mid abdomen malecot drain placed in enterotomy, JPs x 2 placed, necrotic fascia debrided, vicryl mesh sutured to fascia circumferentially, xeroform over mesh then vac dressing placed	        SUBJECTIVE:  Flatus: [ ] YES [ ] NO             Bowel Movement: [ ] YES [ ] NO  Pain (0-10):            Pain Control Adequate: [x ] YES [ ] NO  Nausea: [ ] YES [x ] NO            Vomiting: [ ] YES [x ] NO  Diarrhea: [ ] YES [x ] NO         Constipation: [ ] YES [x ] NO     Chest Pain: [ ] YES [x ] NO    SOB:  [ ] YES [x ] NO    MEDICATIONS  (STANDING):  calcitriol Injectable 1 MICROGram(s) IV Push <User Schedule>  collagenase Ointment 1 Application(s) Topical daily  cyanocobalamin Injectable 1000 MICROGram(s) SubCutaneous every 7 days  dextrose 5%. 1000 milliLiter(s) (50 mL/Hr) IV Continuous <Continuous>  dextrose 50% Injectable 25 Gram(s) IV Push once  dextrose 50% Injectable 12.5 Gram(s) IV Push once  diazepam    Tablet 5 milliGRAM(s) Oral at bedtime  diphenhydrAMINE   Injectable 50 milliGRAM(s) IV Push at bedtime  enoxaparin Injectable 30 milliGRAM(s) SubCutaneous two times a day  escitalopram 20 milliGRAM(s) Oral daily  heparin  flush 100 Units/mL Injectable 100 Unit(s) IV Push every other day  ibuprofen  Tablet 400 milliGRAM(s) Oral once  insulin lispro (HumaLOG) corrective regimen sliding scale   SubCutaneous every 6 hours  losartan 50 milliGRAM(s) Oral daily  nystatin Powder 1 Application(s) Topical two times a day  octreotide  Injectable 450 MICROGram(s) IV Push daily  oxybutynin 10 milliGRAM(s) Oral two times a day  pantoprazole  Injectable 40 milliGRAM(s) IV Push daily  Parenteral Nutrition - Adult 1 Each (73 mL/Hr) TPN Continuous <Continuous>  sodium chloride 0.9% lock flush 20 milliLiter(s) IV Push once  vancomycin  IVPB 750 milliGRAM(s) IV Intermittent every 12 hours  vancomycin  IVPB        MEDICATIONS  (PRN):  acetaminophen   Tablet. 650 milliGRAM(s) Oral every 6 hours PRN Moderate Pain (4 - 6)  HYDROmorphone  Injectable 0.25 milliGRAM(s) IV Push daily PRN Severe Pain (7 - 10)  ondansetron Injectable 4 milliGRAM(s) IV Push every 6 hours PRN Nausea and/or Vomiting  sodium chloride 0.9% lock flush 10 milliLiter(s) IV Push every 1 hour PRN After each medication administration  sodium chloride 0.9% lock flush 10 milliLiter(s) IV Push every 12 hours PRN Lumen of catheter NOT used      Vital Signs Last 24 Hrs  T(C): 37 (30 Oct 2017 05:53), Max: 37.1 (29 Oct 2017 12:46)  T(F): 98.6 (30 Oct 2017 05:53), Max: 98.8 (29 Oct 2017 12:46)  HR: 80 (30 Oct 2017 05:53) (72 - 80)  BP: 160/99 (30 Oct 2017 05:53) (127/85 - 160/99)  BP(mean): --  RR: 17 (30 Oct 2017 05:53) (16 - 17)  SpO2: 95% (30 Oct 2017 05:53) (93% - 99%)    PHYSICAL EXAM:      Constitutional: A&Ox3    Respiratory: non labored breathing, no respiratory distress    Cardiovascular: NSR, RRR     Gastrointestinal: Soft ND,NT                 Incision: VAC in place    Genitourinary: voiding    Extremities: (-) edema                  I&O's Detail    29 Oct 2017 07:01  -  30 Oct 2017 07:00  --------------------------------------------------------  IN:    Fat Emulsion 20%: 187.2 mL    Solution: 300 mL    TPN (Total Parenteral Nutrition): 1769 mL  Total IN: 2256.2 mL    OUT:    VAC (Vacuum Assisted Closure) System: 25 mL    Voided: 200 mL  Total OUT: 225 mL    Total NET: 2031.2 mL          LABS:                        10.1   6.4   )-----------( 308      ( 29 Oct 2017 06:26 )             32.8     10-29    141  |  101  |  36<H>  ----------------------------<  104<H>  4.1   |  23  |  0.55    Ca    9.8      29 Oct 2017 08:16  Phos  3.4     10-29  Mg     2.0     10-29            RADIOLOGY & ADDITIONAL STUDIES:

## 2017-10-31 LAB
ANION GAP SERPL CALC-SCNC: 11 MMOL/L — SIGNIFICANT CHANGE UP (ref 5–17)
BUN SERPL-MCNC: 37 MG/DL — HIGH (ref 7–23)
CALCIUM SERPL-MCNC: 9.9 MG/DL — SIGNIFICANT CHANGE UP (ref 8.4–10.5)
CHLORIDE SERPL-SCNC: 102 MMOL/L — SIGNIFICANT CHANGE UP (ref 96–108)
CO2 SERPL-SCNC: 26 MMOL/L — SIGNIFICANT CHANGE UP (ref 22–31)
CREAT SERPL-MCNC: 0.5 MG/DL — SIGNIFICANT CHANGE UP (ref 0.5–1.3)
GLUCOSE BLDC GLUCOMTR-MCNC: 108 MG/DL — HIGH (ref 70–99)
GLUCOSE BLDC GLUCOMTR-MCNC: 118 MG/DL — HIGH (ref 70–99)
GLUCOSE BLDC GLUCOMTR-MCNC: 131 MG/DL — HIGH (ref 70–99)
GLUCOSE BLDC GLUCOMTR-MCNC: 137 MG/DL — HIGH (ref 70–99)
GLUCOSE SERPL-MCNC: 129 MG/DL — HIGH (ref 70–99)
HCT VFR BLD CALC: 33.8 % — LOW (ref 34.5–45)
HGB BLD-MCNC: 10.2 G/DL — LOW (ref 11.5–15.5)
MAGNESIUM SERPL-MCNC: 2.1 MG/DL — SIGNIFICANT CHANGE UP (ref 1.6–2.6)
MCHC RBC-ENTMCNC: 28.4 PG — SIGNIFICANT CHANGE UP (ref 27–34)
MCHC RBC-ENTMCNC: 30.2 G/DL — LOW (ref 32–36)
MCV RBC AUTO: 94.2 FL — SIGNIFICANT CHANGE UP (ref 80–100)
PHOSPHATE SERPL-MCNC: 3.7 MG/DL — SIGNIFICANT CHANGE UP (ref 2.5–4.5)
PLATELET # BLD AUTO: 291 K/UL — SIGNIFICANT CHANGE UP (ref 150–400)
POTASSIUM SERPL-MCNC: 4.2 MMOL/L — SIGNIFICANT CHANGE UP (ref 3.5–5.3)
POTASSIUM SERPL-SCNC: 4.2 MMOL/L — SIGNIFICANT CHANGE UP (ref 3.5–5.3)
RBC # BLD: 3.59 M/UL — LOW (ref 3.8–5.2)
RBC # FLD: 16.9 % — SIGNIFICANT CHANGE UP (ref 10.3–16.9)
SODIUM SERPL-SCNC: 139 MMOL/L — SIGNIFICANT CHANGE UP (ref 135–145)
VANCOMYCIN TROUGH SERPL-MCNC: 17.9 UG/ML — SIGNIFICANT CHANGE UP (ref 10–20)
WBC # BLD: 7.1 K/UL — SIGNIFICANT CHANGE UP (ref 3.8–10.5)
WBC # FLD AUTO: 7.1 K/UL — SIGNIFICANT CHANGE UP (ref 3.8–10.5)

## 2017-10-31 RX ORDER — ELECTROLYTE SOLUTION,INJ
1 VIAL (ML) INTRAVENOUS
Qty: 0 | Refills: 0 | Status: DISCONTINUED | OUTPATIENT
Start: 2017-10-31 | End: 2017-11-03

## 2017-10-31 RX ORDER — I.V. FAT EMULSION 20 G/100ML
0.5 EMULSION INTRAVENOUS
Qty: 49.75 | Refills: 0 | Status: DISCONTINUED | OUTPATIENT
Start: 2017-10-31 | End: 2017-10-31

## 2017-10-31 RX ADMIN — ONDANSETRON 4 MILLIGRAM(S): 8 TABLET, FILM COATED ORAL at 19:17

## 2017-10-31 RX ADMIN — Medication 650 MILLIGRAM(S): at 13:22

## 2017-10-31 RX ADMIN — LOSARTAN POTASSIUM 50 MILLIGRAM(S): 100 TABLET, FILM COATED ORAL at 05:36

## 2017-10-31 RX ADMIN — Medication 5 MILLIGRAM(S): at 21:15

## 2017-10-31 RX ADMIN — ENOXAPARIN SODIUM 30 MILLIGRAM(S): 100 INJECTION SUBCUTANEOUS at 05:36

## 2017-10-31 RX ADMIN — Medication 650 MILLIGRAM(S): at 12:58

## 2017-10-31 RX ADMIN — Medication 150 MILLIGRAM(S): at 17:46

## 2017-10-31 RX ADMIN — PANTOPRAZOLE SODIUM 40 MILLIGRAM(S): 20 TABLET, DELAYED RELEASE ORAL at 05:36

## 2017-10-31 RX ADMIN — Medication 1 APPLICATION(S): at 05:36

## 2017-10-31 RX ADMIN — Medication 650 MILLIGRAM(S): at 01:25

## 2017-10-31 RX ADMIN — ENOXAPARIN SODIUM 30 MILLIGRAM(S): 100 INJECTION SUBCUTANEOUS at 17:47

## 2017-10-31 RX ADMIN — ESCITALOPRAM OXALATE 20 MILLIGRAM(S): 10 TABLET, FILM COATED ORAL at 21:15

## 2017-10-31 RX ADMIN — OCTREOTIDE ACETATE 450 MICROGRAM(S): 200 INJECTION, SOLUTION INTRAVENOUS; SUBCUTANEOUS at 17:51

## 2017-10-31 RX ADMIN — Medication 650 MILLIGRAM(S): at 19:17

## 2017-10-31 RX ADMIN — HYDROMORPHONE HYDROCHLORIDE 0.25 MILLIGRAM(S): 2 INJECTION INTRAMUSCULAR; INTRAVENOUS; SUBCUTANEOUS at 13:22

## 2017-10-31 RX ADMIN — NYSTATIN CREAM 1 APPLICATION(S): 100000 CREAM TOPICAL at 05:36

## 2017-10-31 RX ADMIN — I.V. FAT EMULSION 20.73 GM/KG/DAY: 20 EMULSION INTRAVENOUS at 19:20

## 2017-10-31 RX ADMIN — HYDROMORPHONE HYDROCHLORIDE 0.25 MILLIGRAM(S): 2 INJECTION INTRAMUSCULAR; INTRAVENOUS; SUBCUTANEOUS at 13:40

## 2017-10-31 RX ADMIN — Medication 1 EACH: at 17:46

## 2017-10-31 RX ADMIN — Medication 650 MILLIGRAM(S): at 07:22

## 2017-10-31 RX ADMIN — Medication 10 MILLIGRAM(S): at 05:36

## 2017-10-31 RX ADMIN — Medication 100 UNIT(S): at 13:23

## 2017-10-31 RX ADMIN — Medication 650 MILLIGRAM(S): at 06:21

## 2017-10-31 RX ADMIN — ONDANSETRON 4 MILLIGRAM(S): 8 TABLET, FILM COATED ORAL at 06:21

## 2017-10-31 RX ADMIN — ONDANSETRON 4 MILLIGRAM(S): 8 TABLET, FILM COATED ORAL at 00:27

## 2017-10-31 RX ADMIN — ONDANSETRON 4 MILLIGRAM(S): 8 TABLET, FILM COATED ORAL at 12:58

## 2017-10-31 RX ADMIN — Medication 150 MILLIGRAM(S): at 08:25

## 2017-10-31 RX ADMIN — NYSTATIN CREAM 1 APPLICATION(S): 100000 CREAM TOPICAL at 17:45

## 2017-10-31 RX ADMIN — Medication 650 MILLIGRAM(S): at 00:27

## 2017-10-31 RX ADMIN — Medication 10 MILLIGRAM(S): at 17:46

## 2017-10-31 RX ADMIN — Medication 50 MILLIGRAM(S): at 21:15

## 2017-10-31 NOTE — CHART NOTE - NSCHARTNOTEFT_GEN_A_CORE
Admitting Diagnosis:   7 F s/p  SBR, fistula resection, esophagojejunostomy revision w/ post-op course complicated by RTOR for distal anastomosis breakdown, ATN, acute respiratory failure, & septic shock, MRSA bacteremia which resolved. Currently for wound care management.   -wound vac changed on 10/30      PAST MEDICAL & SURGICAL HISTORY:  Reflux  Obesity  DVT (deep venous thrombosis): 2013 neck  Diverticulitis  Hypertension  Elective surgery: abodominal wall surgery  dec 2016  Elective surgery: NOV 2016 gastric bypass revision  Gastric bypass status for obesity: gastric sleeve, 6/2012  S/P breast biopsy: 2011, left  S/P colon resection: 2011  S/P knee surgery: repair 2009, 2011  right; left  Umbilical hernia: 2000  S/P appendectomy: 1975  S/P tonsillectomy: 1967      Current Nutrition Order: NPO with Ice Chips/Sips of Water   TPN via central venous line:  1752TV (73ml/hr); 355g D, 160g AA, 50g 20% lipids. Providing pt with 2347 kcal, 3.1g/kg IBW protein.         PO Intake: Good (%) [   ]  Fair (50-75%) [   ] Poor (<25%) [   ]    GI Issues: none    Pain: managed w/ OPMs    Skin Integrity: unstageable pressure ulcer to sacrum and abdominal surgical incision     Labs:   10-31    139  |  102  |  37<H>  ----------------------------<  129<H>  4.2   |  26  |  0.50    Ca    9.9      31 Oct 2017 06:35  Phos  3.7     10-31  Mg     2.1     10-31      CAPILLARY BLOOD GLUCOSE      POCT Blood Glucose.: 108 mg/dL (31 Oct 2017 12:16)  POCT Blood Glucose.: 118 mg/dL (31 Oct 2017 06:22)  POCT Blood Glucose.: 131 mg/dL (31 Oct 2017 00:11)  POCT Blood Glucose.: 125 mg/dL (30 Oct 2017 17:27)      Medications:  MEDICATIONS  (STANDING):  calcitriol Injectable 1 MICROGram(s) IV Push <User Schedule>  collagenase Ointment 1 Application(s) Topical daily  cyanocobalamin Injectable 1000 MICROGram(s) SubCutaneous every 7 days  dextrose 5%. 1000 milliLiter(s) (50 mL/Hr) IV Continuous <Continuous>  dextrose 50% Injectable 25 Gram(s) IV Push once  dextrose 50% Injectable 12.5 Gram(s) IV Push once  diazepam    Tablet 5 milliGRAM(s) Oral at bedtime  diphenhydrAMINE   Injectable 50 milliGRAM(s) IV Push at bedtime  enoxaparin Injectable 30 milliGRAM(s) SubCutaneous two times a day  escitalopram 20 milliGRAM(s) Oral daily  fat emulsion (Plant Based) 20% Infusion 0.5 Gm/kG/Day (20.729 mL/Hr) IV Continuous <Continuous>  heparin  flush 100 Units/mL Injectable 100 Unit(s) IV Push every other day  ibuprofen  Tablet 400 milliGRAM(s) Oral once  insulin lispro (HumaLOG) corrective regimen sliding scale   SubCutaneous every 6 hours  losartan 50 milliGRAM(s) Oral daily  nystatin Powder 1 Application(s) Topical two times a day  octreotide  Injectable 450 MICROGram(s) IV Push daily  oxybutynin 10 milliGRAM(s) Oral two times a day  pantoprazole  Injectable 40 milliGRAM(s) IV Push daily  Parenteral Nutrition - Adult 1 Each (73 mL/Hr) TPN Continuous <Continuous>  Parenteral Nutrition - Adult 1 Each (73 mL/Hr) TPN Continuous <Continuous>  sodium chloride 0.9% lock flush 20 milliLiter(s) IV Push once  vancomycin  IVPB 750 milliGRAM(s) IV Intermittent every 12 hours  vancomycin  IVPB        MEDICATIONS  (PRN):  acetaminophen   Tablet. 650 milliGRAM(s) Oral every 6 hours PRN Moderate Pain (4 - 6)  HYDROmorphone  Injectable 0.25 milliGRAM(s) IV Push daily PRN Severe Pain (7 - 10)  ondansetron Injectable 4 milliGRAM(s) IV Push every 6 hours PRN Nausea and/or Vomiting  sodium chloride 0.9% lock flush 10 milliLiter(s) IV Push every 1 hour PRN After each medication administration  sodium chloride 0.9% lock flush 10 milliLiter(s) IV Push every 12 hours PRN Lumen of catheter NOT used      Weight: 10/17: 74.6  Daily     Daily     Weight Change: no wt updates to assess; 1.1kg difference since admission (7/21)   Estimated energy needs: IBW: 52.3kg  %IBW:  143% BMI: 29.1; IBW utilized for nutritional needs as ABW >120% of IBW;   increased needs per wound and pressure ulcer healing  30-35 kcal/kg (9915-5143 kcal).   1.8-2 g/kg ( g protein).  30-35 mL/kg (6463-1271 mL fluid).    Subjective: pt denies any GI issues; on po ice chips; getting wound vac management, w/4 fistulas. Current TPN order: 1752TV (73ml/hr); 355g D, 160g AA, 50g 20% lipids and providing pt with 2347 kcal (44.9/IBW), 3.1g/kg IBW protein. GIR: 3.4. 128%of kcal and 154%of protein needs (per estimated upper calorie/protein limit).  Pt receiving excessive kcal/protein from tpn order, d/w team and asked RN to update wt to r/o wt loss.     Previous Nutrition Diagnosis: Increased nutrient needs RT increased demand for nutrients AEB wound and pressure ulcer healing   Active [  X]  Resolved [   ]      If resolved, new PES:     Goal: PT to meet >75% EER via tolerated route    Recommendations:   -please change TPN order consistent  w/ previous f/u: 110g AA, 312g Dex, 50g 20% lipids 3-4x/day (2000kcal, 2.1g pro/kg IBW, GIR 2.19).  -Please update current wt and check wt weekly  -Continue to check labs: triglycerides, BG & FS and adjust TPN per MD discretion  -Check labs for signs of fat malabsorption, consider ADEK vitamin.    Education: meeting nutritional needs via TPN.        Education:     Risk Level: High [  X] Moderate [   ] Low [   ]

## 2017-10-31 NOTE — CONSULT NOTE ADULT - SUBJECTIVE AND OBJECTIVE BOX
HPI:  56F w/PMHx of HTN, gastric bypass in 2002 c/b stricture, corkscrewed sleeve and chronic leak, revised on 11/28/16 with protracted (70-day) postoperative course complicated by MDR infections, a C-diff infection, respiratory compromise due to plugging/PNA/effusions requiring multiple interventions and a trach, as well as a continued leak requiring a draining double-pigtail with stenting performed by GI. 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 Denies CP, SOB, N/V, diarrhea. (24 Jul 2017 14:15)      STATUS POST:  7/24: SBR resection, fistula resection, revision of esophagogegunostomy drain through esophageal hiatus in R mediastinum  7/29: Ex-lap, SB anastamosis in BP limb breakdown with succus throughout abdomen  8/1: abd washout, drainage of abscess, biologic mesh placement, closure of abdominal wall, vac and retention suture  8/7: abd washout, mesh removal, frozen abd noted, LLQ CHECO replaced with benson drain  8/10: leak noted from previous anastamosis site on L side, mid abdomen malecot drain placed in enterotomy, JPs x 2 placed, necrotic fascia debrided, vicryl mesh sutured to fascia circumferentially, xeroform over mesh then vac dressing placed	    Allergies    sulfa drugs (Unknown)  sulfamethoxazole (Other)    Intolerances      MEDICATIONS:  MEDICATIONS  (STANDING):  calcitriol Injectable 1 MICROGram(s) IV Push <User Schedule>  collagenase Ointment 1 Application(s) Topical daily  cyanocobalamin Injectable 1000 MICROGram(s) SubCutaneous every 7 days  dextrose 5%. 1000 milliLiter(s) (50 mL/Hr) IV Continuous <Continuous>  dextrose 50% Injectable 25 Gram(s) IV Push once  dextrose 50% Injectable 12.5 Gram(s) IV Push once  diazepam    Tablet 5 milliGRAM(s) Oral at bedtime  diphenhydrAMINE   Injectable 50 milliGRAM(s) IV Push at bedtime  enoxaparin Injectable 30 milliGRAM(s) SubCutaneous two times a day  escitalopram 20 milliGRAM(s) Oral daily  fat emulsion (Plant Based) 20% Infusion 0.5 Gm/kG/Day (20.729 mL/Hr) IV Continuous <Continuous>  heparin  flush 100 Units/mL Injectable 100 Unit(s) IV Push every other day  ibuprofen  Tablet 400 milliGRAM(s) Oral once  insulin lispro (HumaLOG) corrective regimen sliding scale   SubCutaneous every 6 hours  losartan 50 milliGRAM(s) Oral daily  nystatin Powder 1 Application(s) Topical two times a day  octreotide  Injectable 450 MICROGram(s) IV Push daily  oxybutynin 10 milliGRAM(s) Oral two times a day  pantoprazole  Injectable 40 milliGRAM(s) IV Push daily  Parenteral Nutrition - Adult 1 Each (73 mL/Hr) TPN Continuous <Continuous>  Parenteral Nutrition - Adult 1 Each (73 mL/Hr) TPN Continuous <Continuous>  sodium chloride 0.9% lock flush 20 milliLiter(s) IV Push once  vancomycin  IVPB 750 milliGRAM(s) IV Intermittent every 12 hours  vancomycin  IVPB        MEDICATIONS  (PRN):  acetaminophen   Tablet. 650 milliGRAM(s) Oral every 6 hours PRN Moderate Pain (4 - 6)  HYDROmorphone  Injectable 0.25 milliGRAM(s) IV Push daily PRN Severe Pain (7 - 10)  ondansetron Injectable 4 milliGRAM(s) IV Push every 6 hours PRN Nausea and/or Vomiting  sodium chloride 0.9% lock flush 10 milliLiter(s) IV Push every 1 hour PRN After each medication administration  sodium chloride 0.9% lock flush 10 milliLiter(s) IV Push every 12 hours PRN Lumen of catheter NOT used    PAST MEDICAL & SURGICAL HISTORY:  Reflux  Obesity  DVT (deep venous thrombosis): 2013 neck  Diverticulitis  Hypertension  Elective surgery: abodominal wall surgery  dec 2016  Elective surgery: NOV 2016 gastric bypass revision  Gastric bypass status for obesity: gastric sleeve, 6/2012  S/P breast biopsy: 2011, left  S/P colon resection: 2011  S/P knee surgery: repair 2009, 2011  right; left  Umbilical hernia: 2000  S/P appendectomy: 1975  S/P tonsillectomy: 1967    FAMILY HISTORY:  No pertinent family history in first degree relatives    SOCIAL HISTORY:  [place]: [ ] Current, [ ] Former, [ ] Never; Pack Years:  Alcohol:  Illicit Drugs:    REVIEW OF SYSTEMS:  Constitutional: No fever, chills, weight loss, or fatigue  ENMT:  No visual changes; No difficulty hearing, tinnitus, vertigo; No sinus or throat pain  Neck: No pain or stiffness  Respiratory: No cough, wheezing, or hemoptysis; No shortness of breath  Cardiovascular: No chest pain, palpitations, dizziness, orthopnea, PND, or leg swelling  Gastrointestinal: No abdominal or epigastric pain. No  nausea, vomiting, or hematemesis. No diarrhea, constipation, or steatorrhea. No melena or hematochezia  Genitourinary: No dysuria, urinary frequency/hesitancy, or hematuria  Skin: No itching, burning, rashes or lesions   Musculoskeletal: No joint pain or swelling; No muscle, back or extremity pain    Vital Signs Last 24 Hrs  T(C): 36.7 (31 Oct 2017 09:30), Max: 36.7 (31 Oct 2017 05:45)  T(F): 98 (31 Oct 2017 09:30), Max: 98.1 (31 Oct 2017 05:45)  HR: 74 (31 Oct 2017 09:30) (73 - 76)  BP: 139/86 (31 Oct 2017 09:30) (139/86 - 149/85)  BP(mean): --  RR: 20 (31 Oct 2017 09:30) (17 - 20)  SpO2: 94% (31 Oct 2017 09:30) (93% - 94%)    10-30 @ 07:01  -  10-31 @ 07:00  --------------------------------------------------------  IN: 1835.2 mL / OUT: 500 mL / NET: 1335.2 mL        PHYSICAL EXAM:    General: Well developed; well nourished; in no acute distress  HEENT: MMM, conjunctiva and sclera clear  Gastrointestinal: Soft, non-tender non-distended; Normal bowel sounds; No rebound or guarding  Extremities: Normal range of motion, No clubbing, cyanosis or edema  Neurological: Alert and oriented x3  Skin: Warm and dry. No obvious rash    LABS:                        10.2   7.1   )-----------( 291      ( 31 Oct 2017 06:35 )             33.8     10-31    139  |  102  |  37<H>  ----------------------------<  129<H>  4.2   |  26  |  0.50    Ca    9.9      31 Oct 2017 06:35  Phos  3.7     10-31  Mg     2.1     10-31          RADIOLOGY & ADDITIONAL STUDIES: HPI:  57 YO F h/o HTN, gastric bypass in 2002 c/b stricture, corkscrewed sleeve and chronic leak, revised on 11/28/16 with protracted (70-day) postoperative course complicated by MDR infections, C-diff infection, respiratory compromise due to mucus plugging/PNA/pleural effusions requiring multiple interventions and a trach, as well as a continued leak requiring a draining double-pigtail with stenting performed by Dr. Sumner presents for enterocutaneous fistula resection. Pt has had a complicated hospital course including small bowel resection, fistula resection, esophagojejunostomy revision w/ post-op course complicated by RTOR for distal anastomosis breakdown, ATN, acute respiratory failure, & septic shock, MRSA bacteremia, now resolved.        STATUS POST:  7/24: SBR resection, fistula resection, revision of esophagogegunostomy drain through esophageal hiatus in R mediastinum  7/29: Ex-lap, SB anastamosis in BP limb breakdown with succus throughout abdomen  8/1: abd washout, drainage of abscess, biologic mesh placement, closure of abdominal wall, vac and retention suture  8/7: abd washout, mesh removal, frozen abd noted, LLQ CHECO replaced with benson drain  8/10: leak noted from previous anastamosis site on L side, mid abdomen malecot drain placed in enterotomy, JPs x 2 placed, necrotic fascia debrided, vicryl mesh sutured to fascia circumferentially, xeroform over mesh then vac dressing placed	    Allergies    sulfa drugs (Unknown)  sulfamethoxazole (Other)    Intolerances      MEDICATIONS:  MEDICATIONS  (STANDING):  calcitriol Injectable 1 MICROGram(s) IV Push <User Schedule>  collagenase Ointment 1 Application(s) Topical daily  cyanocobalamin Injectable 1000 MICROGram(s) SubCutaneous every 7 days  dextrose 5%. 1000 milliLiter(s) (50 mL/Hr) IV Continuous <Continuous>  dextrose 50% Injectable 25 Gram(s) IV Push once  dextrose 50% Injectable 12.5 Gram(s) IV Push once  diazepam    Tablet 5 milliGRAM(s) Oral at bedtime  diphenhydrAMINE   Injectable 50 milliGRAM(s) IV Push at bedtime  enoxaparin Injectable 30 milliGRAM(s) SubCutaneous two times a day  escitalopram 20 milliGRAM(s) Oral daily  fat emulsion (Plant Based) 20% Infusion 0.5 Gm/kG/Day (20.729 mL/Hr) IV Continuous <Continuous>  heparin  flush 100 Units/mL Injectable 100 Unit(s) IV Push every other day  ibuprofen  Tablet 400 milliGRAM(s) Oral once  insulin lispro (HumaLOG) corrective regimen sliding scale   SubCutaneous every 6 hours  losartan 50 milliGRAM(s) Oral daily  nystatin Powder 1 Application(s) Topical two times a day  octreotide  Injectable 450 MICROGram(s) IV Push daily  oxybutynin 10 milliGRAM(s) Oral two times a day  pantoprazole  Injectable 40 milliGRAM(s) IV Push daily  Parenteral Nutrition - Adult 1 Each (73 mL/Hr) TPN Continuous <Continuous>  Parenteral Nutrition - Adult 1 Each (73 mL/Hr) TPN Continuous <Continuous>  sodium chloride 0.9% lock flush 20 milliLiter(s) IV Push once  vancomycin  IVPB 750 milliGRAM(s) IV Intermittent every 12 hours  vancomycin  IVPB        MEDICATIONS  (PRN):  acetaminophen   Tablet. 650 milliGRAM(s) Oral every 6 hours PRN Moderate Pain (4 - 6)  HYDROmorphone  Injectable 0.25 milliGRAM(s) IV Push daily PRN Severe Pain (7 - 10)  ondansetron Injectable 4 milliGRAM(s) IV Push every 6 hours PRN Nausea and/or Vomiting  sodium chloride 0.9% lock flush 10 milliLiter(s) IV Push every 1 hour PRN After each medication administration  sodium chloride 0.9% lock flush 10 milliLiter(s) IV Push every 12 hours PRN Lumen of catheter NOT used    PAST MEDICAL & SURGICAL HISTORY:  Reflux  Obesity  DVT (deep venous thrombosis): 2013 neck  Diverticulitis  Hypertension  Elective surgery: abodominal wall surgery  dec 2016  Elective surgery: NOV 2016 gastric bypass revision  Gastric bypass status for obesity: gastric sleeve, 6/2012  S/P breast biopsy: 2011, left  S/P colon resection: 2011  S/P knee surgery: repair 2009, 2011  right; left  Umbilical hernia: 2000  S/P appendectomy: 1975  S/P tonsillectomy: 1967    FAMILY HISTORY:  No pertinent family history in first degree relatives    SOCIAL HISTORY:  [place]: [ ] Current, [ ] Former, [ ] Never; Pack Years:  Alcohol:  Illicit Drugs:    REVIEW OF SYSTEMS:  Constitutional: No fever, chills, weight loss, or fatigue  ENMT:  No visual changes; No difficulty hearing, tinnitus, vertigo; No sinus or throat pain  Neck: No pain or stiffness  Respiratory: No cough, wheezing, or hemoptysis; No shortness of breath  Cardiovascular: No chest pain, palpitations, dizziness, orthopnea, PND, or leg swelling  Gastrointestinal: No abdominal or epigastric pain. No  nausea, vomiting, or hematemesis. No diarrhea, constipation, or steatorrhea. No melena or hematochezia  Genitourinary: No dysuria, urinary frequency/hesitancy, or hematuria  Skin: No itching, burning, rashes or lesions   Musculoskeletal: No joint pain or swelling; No muscle, back or extremity pain    Vital Signs Last 24 Hrs  T(C): 36.7 (31 Oct 2017 09:30), Max: 36.7 (31 Oct 2017 05:45)  T(F): 98 (31 Oct 2017 09:30), Max: 98.1 (31 Oct 2017 05:45)  HR: 74 (31 Oct 2017 09:30) (73 - 76)  BP: 139/86 (31 Oct 2017 09:30) (139/86 - 149/85)  BP(mean): --  RR: 20 (31 Oct 2017 09:30) (17 - 20)  SpO2: 94% (31 Oct 2017 09:30) (93% - 94%)    10-30 @ 07:01  -  10-31 @ 07:00  --------------------------------------------------------  IN: 1835.2 mL / OUT: 500 mL / NET: 1335.2 mL        PHYSICAL EXAM:    General: Well developed; well nourished; in no acute distress  HEENT: MMM, conjunctiva and sclera clear  Gastrointestinal: Soft, non-tender non-distended; Normal bowel sounds; No rebound or guarding  Extremities: Normal range of motion, No clubbing, cyanosis or edema  Neurological: Alert and oriented x3  Skin: Warm and dry. No obvious rash    LABS:                        10.2   7.1   )-----------( 291      ( 31 Oct 2017 06:35 )             33.8     10-31    139  |  102  |  37<H>  ----------------------------<  129<H>  4.2   |  26  |  0.50    Ca    9.9      31 Oct 2017 06:35  Phos  3.7     10-31  Mg     2.1     10-31          RADIOLOGY & ADDITIONAL STUDIES: HPI:  55 YO F h/o HTN, gastric bypass in 2002 c/b stricture, corkscrewed sleeve and chronic leak, revised on 11/28/16 with protracted (70-day) postoperative course complicated by MDR infections, C-diff infection, respiratory compromise due to mucus plugging/PNA/pleural effusions requiring multiple interventions and a trach, as well as a continued leak requiring a draining double-pigtail with stenting performed by Dr. Sumner presents for enterocutaneous fistula resection. Pt is s/p 7/24: SBR resection, fistula resection, revision of esophagojejunostomy drain through esophageal hiatus in R mediastinum 7/29: Ex-lap, SB anastamosis in BP limb breakdown with succus throughout abdomen; 8/1: abd washout, drainage of abscess, biologic mesh placement, closure of abdominal wall, vac and retention suture; 8/7: abd washout, mesh removal, frozen abd noted, LLQ CHECO replaced with benson drain; 8/10: leak noted from previous anastamosis site on L side, mid abdomen malecot drain placed in enterotomy, JPs x 2 placed, necrotic fascia debrided, vicryl mesh sutured to fascia circumferentially, xeroform over mesh then vac dressing placed. In addition, hospital course complicated by ATN, acute respiratory failure, & septic shock 2/2 MRSA bacteremia, now resolved.    Pt reports nausea, mild abdominal pain, and drainage from anterior abdominal fistula, but denies fever, CP, SOB, vomiting, melena, hematochezia, diarrhea.    Allergies    sulfa drugs (Unknown)  sulfamethoxazole (Other)    MEDICATIONS:  MEDICATIONS  (STANDING):  calcitriol Injectable 1 MICROGram(s) IV Push <User Schedule>  collagenase Ointment 1 Application(s) Topical daily  cyanocobalamin Injectable 1000 MICROGram(s) SubCutaneous every 7 days  dextrose 5%. 1000 milliLiter(s) (50 mL/Hr) IV Continuous <Continuous>  dextrose 50% Injectable 25 Gram(s) IV Push once  dextrose 50% Injectable 12.5 Gram(s) IV Push once  diazepam    Tablet 5 milliGRAM(s) Oral at bedtime  diphenhydrAMINE   Injectable 50 milliGRAM(s) IV Push at bedtime  enoxaparin Injectable 30 milliGRAM(s) SubCutaneous two times a day  escitalopram 20 milliGRAM(s) Oral daily  fat emulsion (Plant Based) 20% Infusion 0.5 Gm/kG/Day (20.729 mL/Hr) IV Continuous <Continuous>  heparin  flush 100 Units/mL Injectable 100 Unit(s) IV Push every other day  ibuprofen  Tablet 400 milliGRAM(s) Oral once  insulin lispro (HumaLOG) corrective regimen sliding scale   SubCutaneous every 6 hours  losartan 50 milliGRAM(s) Oral daily  nystatin Powder 1 Application(s) Topical two times a day  octreotide  Injectable 450 MICROGram(s) IV Push daily  oxybutynin 10 milliGRAM(s) Oral two times a day  pantoprazole  Injectable 40 milliGRAM(s) IV Push daily  Parenteral Nutrition - Adult 1 Each (73 mL/Hr) TPN Continuous <Continuous>  Parenteral Nutrition - Adult 1 Each (73 mL/Hr) TPN Continuous <Continuous>  sodium chloride 0.9% lock flush 20 milliLiter(s) IV Push once  vancomycin  IVPB 750 milliGRAM(s) IV Intermittent every 12 hours  vancomycin  IVPB        MEDICATIONS  (PRN):  acetaminophen   Tablet. 650 milliGRAM(s) Oral every 6 hours PRN Moderate Pain (4 - 6)  HYDROmorphone  Injectable 0.25 milliGRAM(s) IV Push daily PRN Severe Pain (7 - 10)  ondansetron Injectable 4 milliGRAM(s) IV Push every 6 hours PRN Nausea and/or Vomiting  sodium chloride 0.9% lock flush 10 milliLiter(s) IV Push every 1 hour PRN After each medication administration  sodium chloride 0.9% lock flush 10 milliLiter(s) IV Push every 12 hours PRN Lumen of catheter NOT used    PAST MEDICAL & SURGICAL HISTORY:  Reflux  Obesity  DVT (deep venous thrombosis): 2013 neck  Diverticulitis  Hypertension  Elective surgery: abodominal wall surgery  dec 2016  Elective surgery: NOV 2016 gastric bypass revision  Gastric bypass status for obesity: gastric sleeve, 6/2012  S/P breast biopsy: 2011, left  S/P colon resection: 2011  S/P knee surgery: repair 2009, 2011  right; left  Umbilical hernia: 2000  S/P appendectomy: 1975  S/P tonsillectomy: 1967    FAMILY HISTORY:  No pertinent family history in first degree relatives    REVIEW OF SYSTEMS: per HPI    Vital Signs Last 24 Hrs  T(C): 36.7 (31 Oct 2017 09:30), Max: 36.7 (31 Oct 2017 05:45)  T(F): 98 (31 Oct 2017 09:30), Max: 98.1 (31 Oct 2017 05:45)  HR: 74 (31 Oct 2017 09:30) (73 - 76)  BP: 139/86 (31 Oct 2017 09:30) (139/86 - 149/85)  BP(mean): --  RR: 20 (31 Oct 2017 09:30) (17 - 20)  SpO2: 94% (31 Oct 2017 09:30) (93% - 94%)    10-30 @ 07:01  -  10-31 @ 07:00  --------------------------------------------------------  IN: 1835.2 mL / OUT: 500 mL / NET: 1335.2 mL    PHYSICAL EXAM:    General: Well developed; well nourished; in no acute distress  HEENT: MMM, conjunctiva and sclera clear  Gastrointestinal: Soft, Anterior abdominal wound vac in place with bilious drainage lateral to wound with surrounding erythema, mild TTP  non-distended; No rebound or guarding  Extremities: Normal range of motion, No clubbing, cyanosis or edema  Neurological: Alert and oriented x3  Skin: Warm and dry.    LABS:                        10.2   7.1   )-----------( 291      ( 31 Oct 2017 06:35 )             33.8     10-31    139  |  102  |  37<H>  ----------------------------<  129<H>  4.2   |  26  |  0.50    Ca    9.9      31 Oct 2017 06:35  Phos  3.7     10-31  Mg     2.1     10-31      RADIOLOGY & ADDITIONAL STUDIES: Reviewed

## 2017-10-31 NOTE — CONSULT NOTE ADULT - ASSESSMENT
57 YO F h/o HTN, gastric bypass in 2002 c/b stricture, corkscrewed sleeve and chronic leak, revised on 11/28/16 with protracted (70-day) postoperative course complicated by MDR infections, C-diff infection, respiratory compromise due to mucus plugging/PNA/pleural effusions requiring multiple interventions and a trach, as well as a continued leak requiring a draining double-pigtail with stenting performed by Dr. Sumner presents for enterocutaneous fistula resection with complicated hospital course and recurrent enterocutaneous fistula.    # Enterocutaneous fistula  - Per discussion with Dr. Tolentino and Dr. Sumner, will plan for endoscopic evaluation of enterocutaneous fistula  - Please keep pt NPO p MN  - Please check CBC, CMP, PT/INR, Type & Screen, and EKG for pre-op    Case d/w Dr. Sumner

## 2017-10-31 NOTE — PROGRESS NOTE ADULT - SUBJECTIVE AND OBJECTIVE BOX
O/N: BRAYDEN, VSS  10/30: wound vac changed, BRAYDEN, VSS    STATUS POST:  7/24: SBR resection, fistula resection, revision of esophagogegunostomy drain through esophageal hiatus in R mediastinum  7/29: Ex-lap, SB anastamosis in BP limb breakdown with succus throughout abdomen  8/1: abd washout, drainage of abscess, biologic mesh placement, closure of abdominal wall, vac and retention suture  8/7: abd washout, mesh removal, frozen abd noted, LLQ CHECO replaced with benson drain  8/10: leak noted from previous anastamosis site on L side, mid abdomen malecot drain placed in enterotomy, JPs x 2 placed, necrotic fascia debrided, vicryl mesh sutured to fascia circumferentially, xeroform over mesh then vac dressing placed OVERNIGHT EVENTS: BRAYDEN, VSS  10/30: wound vac changed, BRAYDEN, VSS    STATUS POST:  7/24: SBR resection, fistula resection, revision of esophagogegunostomy drain through esophageal hiatus in R mediastinum  7/29: Ex-lap, SB anastamosis in BP limb breakdown with succus throughout abdomen  8/1: abd washout, drainage of abscess, biologic mesh placement, closure of abdominal wall, vac and retention suture  8/7: abd washout, mesh removal, frozen abd noted, LLQ CHECO replaced with benson drain  8/10: leak noted from previous anastamosis site on L side, mid abdomen malecot drain placed in enterotomy, JPs x 2 placed, necrotic fascia debrided, vicryl mesh sutured to fascia circumferentially, xeroform over mesh then vac dressing placed	              SUBJECTIVE: Patient examined bedside. Patient complains of itching and pain around vac site    Flatus: [X] YES [] NO             Bowel Movement: [X ] YES [ ] NO  Pain (0-10):            Pain Control Adequate: [ X] YES [ ] NO  Nausea: [ ] YES [X ] NO            Vomiting: [ ] YES [ X] NO  Diarrhea: [ ] YES [X ] NO         Constipation: [ ] YES [X] NO     Chest Pain: [ ] YES [ X] NO    SOB:  [ ] YES [ X] NO    enoxaparin Injectable 30 milliGRAM(s) SubCutaneous two times a day  heparin  flush 100 Units/mL Injectable 100 Unit(s) IV Push every other day  losartan 50 milliGRAM(s) Oral daily  vancomycin  IVPB 750 milliGRAM(s) IV Intermittent every 12 hours  vancomycin  IVPB          Vital Signs Last 24 Hrs  T(C): 36.7 (31 Oct 2017 05:45), Max: 37.1 (30 Oct 2017 12:36)  T(F): 98.1 (31 Oct 2017 05:45), Max: 98.8 (30 Oct 2017 12:36)  HR: 76 (31 Oct 2017 05:45) (73 - 89)  BP: 149/85 (31 Oct 2017 05:45) (141/90 - 150/87)  BP(mean): --  RR: 17 (31 Oct 2017 05:45) (16 - 17)  SpO2: 94% (31 Oct 2017 05:45) (93% - 94%)  I&O's Detail    30 Oct 2017 07:01  -  31 Oct 2017 07:00  --------------------------------------------------------  IN:    Fat Emulsion 20%: 83.2 mL    TPN (Total Parenteral Nutrition): 1752 mL  Total IN: 1835.2 mL    OUT:    Drain: 100 mL    VAC (Vacuum Assisted Closure) System: 400 mL  Total OUT: 500 mL    Total NET: 1335.2 mL          General: NAD, resting comfortably in bed  C/V: NSR  Pulm: Nonlabored breathing, no respiratory distress  Abd: soft, non distended , TTP around vac site.  Extrem: WWP, no edema, SCDs in place  Wound vac functioning       LABS:                        10.2   7.1   )-----------( 291      ( 31 Oct 2017 06:35 )             33.8     10-30    138  |  101  |  34<H>  ----------------------------<  135<H>  4.0   |  25  |  0.50    Ca    9.7      30 Oct 2017 07:56  Phos  3.2     10-30  Mg     2.0     10-30            RADIOLOGY & ADDITIONAL STUDIES:

## 2017-11-01 LAB
ANION GAP SERPL CALC-SCNC: 12 MMOL/L — SIGNIFICANT CHANGE UP (ref 5–17)
APTT BLD: 36.2 SEC — SIGNIFICANT CHANGE UP (ref 27.5–37.4)
BUN SERPL-MCNC: 40 MG/DL — HIGH (ref 7–23)
CALCIUM SERPL-MCNC: 9.7 MG/DL — SIGNIFICANT CHANGE UP (ref 8.4–10.5)
CHLORIDE SERPL-SCNC: 100 MMOL/L — SIGNIFICANT CHANGE UP (ref 96–108)
CO2 SERPL-SCNC: 28 MMOL/L — SIGNIFICANT CHANGE UP (ref 22–31)
CREAT SERPL-MCNC: 0.52 MG/DL — SIGNIFICANT CHANGE UP (ref 0.5–1.3)
GLUCOSE BLDC GLUCOMTR-MCNC: 113 MG/DL — HIGH (ref 70–99)
GLUCOSE BLDC GLUCOMTR-MCNC: 115 MG/DL — HIGH (ref 70–99)
GLUCOSE BLDC GLUCOMTR-MCNC: 121 MG/DL — HIGH (ref 70–99)
GLUCOSE BLDC GLUCOMTR-MCNC: 129 MG/DL — HIGH (ref 70–99)
GLUCOSE BLDC GLUCOMTR-MCNC: 76 MG/DL — SIGNIFICANT CHANGE UP (ref 70–99)
GLUCOSE SERPL-MCNC: 122 MG/DL — HIGH (ref 70–99)
HCT VFR BLD CALC: 33.8 % — LOW (ref 34.5–45)
HGB BLD-MCNC: 10.2 G/DL — LOW (ref 11.5–15.5)
INR BLD: 1.21 — HIGH (ref 0.88–1.16)
MAGNESIUM SERPL-MCNC: 2 MG/DL — SIGNIFICANT CHANGE UP (ref 1.6–2.6)
MCHC RBC-ENTMCNC: 28.4 PG — SIGNIFICANT CHANGE UP (ref 27–34)
MCHC RBC-ENTMCNC: 30.2 G/DL — LOW (ref 32–36)
MCV RBC AUTO: 94.2 FL — SIGNIFICANT CHANGE UP (ref 80–100)
PHOSPHATE SERPL-MCNC: 3.5 MG/DL — SIGNIFICANT CHANGE UP (ref 2.5–4.5)
PLATELET # BLD AUTO: 318 K/UL — SIGNIFICANT CHANGE UP (ref 150–400)
POTASSIUM SERPL-MCNC: 4.1 MMOL/L — SIGNIFICANT CHANGE UP (ref 3.5–5.3)
POTASSIUM SERPL-SCNC: 4.1 MMOL/L — SIGNIFICANT CHANGE UP (ref 3.5–5.3)
PROTHROM AB SERPL-ACNC: 13.5 SEC — HIGH (ref 9.8–12.7)
RBC # BLD: 3.59 M/UL — LOW (ref 3.8–5.2)
RBC # FLD: 17.3 % — HIGH (ref 10.3–16.9)
SODIUM SERPL-SCNC: 140 MMOL/L — SIGNIFICANT CHANGE UP (ref 135–145)
VANCOMYCIN TROUGH SERPL-MCNC: 11.4 UG/ML — SIGNIFICANT CHANGE UP (ref 10–20)
WBC # BLD: 8.5 K/UL — SIGNIFICANT CHANGE UP (ref 3.8–10.5)
WBC # FLD AUTO: 8.5 K/UL — SIGNIFICANT CHANGE UP (ref 3.8–10.5)

## 2017-11-01 PROCEDURE — 44380 SMALL BOWEL ENDOSCOPY BR/WA: CPT

## 2017-11-01 RX ORDER — ELECTROLYTE SOLUTION,INJ
1 VIAL (ML) INTRAVENOUS
Qty: 0 | Refills: 0 | Status: DISCONTINUED | OUTPATIENT
Start: 2017-11-01 | End: 2017-11-03

## 2017-11-01 RX ORDER — ACETAMINOPHEN 500 MG
1000 TABLET ORAL ONCE
Qty: 0 | Refills: 0 | Status: COMPLETED | OUTPATIENT
Start: 2017-11-01 | End: 2017-11-01

## 2017-11-01 RX ORDER — I.V. FAT EMULSION 20 G/100ML
50 EMULSION INTRAVENOUS ONCE
Qty: 0 | Refills: 0 | Status: COMPLETED | OUTPATIENT
Start: 2017-11-01 | End: 2017-11-01

## 2017-11-01 RX ADMIN — LOSARTAN POTASSIUM 50 MILLIGRAM(S): 100 TABLET, FILM COATED ORAL at 05:50

## 2017-11-01 RX ADMIN — Medication 650 MILLIGRAM(S): at 23:12

## 2017-11-01 RX ADMIN — NYSTATIN CREAM 1 APPLICATION(S): 100000 CREAM TOPICAL at 19:38

## 2017-11-01 RX ADMIN — ONDANSETRON 4 MILLIGRAM(S): 8 TABLET, FILM COATED ORAL at 13:34

## 2017-11-01 RX ADMIN — PREGABALIN 1000 MICROGRAM(S): 225 CAPSULE ORAL at 13:34

## 2017-11-01 RX ADMIN — ENOXAPARIN SODIUM 30 MILLIGRAM(S): 100 INJECTION SUBCUTANEOUS at 19:37

## 2017-11-01 RX ADMIN — Medication 10 MILLIGRAM(S): at 05:51

## 2017-11-01 RX ADMIN — Medication 10 MILLIGRAM(S): at 22:32

## 2017-11-01 RX ADMIN — ESCITALOPRAM OXALATE 20 MILLIGRAM(S): 10 TABLET, FILM COATED ORAL at 22:32

## 2017-11-01 RX ADMIN — Medication 400 MILLIGRAM(S): at 14:10

## 2017-11-01 RX ADMIN — ONDANSETRON 4 MILLIGRAM(S): 8 TABLET, FILM COATED ORAL at 01:07

## 2017-11-01 RX ADMIN — Medication 1 APPLICATION(S): at 05:53

## 2017-11-01 RX ADMIN — Medication 650 MILLIGRAM(S): at 01:40

## 2017-11-01 RX ADMIN — OCTREOTIDE ACETATE 450 MICROGRAM(S): 200 INJECTION, SOLUTION INTRAVENOUS; SUBCUTANEOUS at 13:35

## 2017-11-01 RX ADMIN — PANTOPRAZOLE SODIUM 40 MILLIGRAM(S): 20 TABLET, DELAYED RELEASE ORAL at 05:50

## 2017-11-01 RX ADMIN — Medication 650 MILLIGRAM(S): at 07:31

## 2017-11-01 RX ADMIN — Medication 1000 MILLIGRAM(S): at 15:00

## 2017-11-01 RX ADMIN — NYSTATIN CREAM 1 APPLICATION(S): 100000 CREAM TOPICAL at 05:52

## 2017-11-01 RX ADMIN — Medication 50 MILLIGRAM(S): at 22:32

## 2017-11-01 RX ADMIN — Medication 650 MILLIGRAM(S): at 01:07

## 2017-11-01 RX ADMIN — I.V. FAT EMULSION 20.83 GRAM(S): 20 EMULSION INTRAVENOUS at 19:38

## 2017-11-01 RX ADMIN — Medication 1 EACH: at 19:39

## 2017-11-01 RX ADMIN — Medication 150 MILLIGRAM(S): at 06:45

## 2017-11-01 RX ADMIN — CALCITRIOL 1 MICROGRAM(S): 0.5 CAPSULE ORAL at 13:35

## 2017-11-01 RX ADMIN — Medication 150 MILLIGRAM(S): at 23:55

## 2017-11-01 RX ADMIN — Medication 5 MILLIGRAM(S): at 22:35

## 2017-11-01 RX ADMIN — ENOXAPARIN SODIUM 30 MILLIGRAM(S): 100 INJECTION SUBCUTANEOUS at 05:50

## 2017-11-01 RX ADMIN — ONDANSETRON 4 MILLIGRAM(S): 8 TABLET, FILM COATED ORAL at 23:12

## 2017-11-01 RX ADMIN — ONDANSETRON 4 MILLIGRAM(S): 8 TABLET, FILM COATED ORAL at 07:31

## 2017-11-01 NOTE — PROGRESS NOTE ADULT - SUBJECTIVE AND OBJECTIVE BOX
O/N: BRAYDEN, VSS, vac reinforced and functional  10/31: wound vac functioning , VSS , Dr. Sumner  will  go through fistula to see distally and if possible the tube will be left distally  per Dr. Brady, Dr. Sumner will return to [facility]     STATUS POST:   7/24: SBR resection, fistula resection, revision of esophagogegunostomy drain through esophageal hiatus in R mediastinum  7/29: Ex-lap, SB anastamosis in BP limb breakdown with succus throughout abdomen  8/1: abd washout, drainage of abscess, biologic mesh placement, closure of abdominal wall, vac and retention suture  8/7: abd washout, mesh removal, frozen abd noted, LLQ CHECO replaced with benson drain  8/10: leak noted from previous anastamosis site on L side, mid abdomen malecot drain placed in enterotomy, JPs x 2 placed, necrotic fascia debrided, vicryl mesh sutured to fascia circumferentially, xeroform over mesh then vac dressing placed OVERNIGHT EVENTS: BRAYDEN, VSS, vac reinforced and functional  10/31: wound vac functioning , VSS , Dr. Sumner  will  go through fistula to see distally and if possible the tube will be left distally  per Dr. Brady, Dr. Sumner will return to [facility]     STATUS POST:   7/24: SBR resection, fistula resection, revision of esophagogegunostomy drain through esophageal hiatus in R mediastinum  7/29: Ex-lap, SB anastamosis in BP limb breakdown with succus throughout abdomen  8/1: abd washout, drainage of abscess, biologic mesh placement, closure of abdominal wall, vac and retention suture  8/7: abd washout, mesh removal, frozen abd noted, LLQ CHECO replaced with benson drain  8/10: leak noted from previous anastamosis site on L side, mid abdomen malecot drain placed in enterotomy, JPs x 2 placed, necrotic fascia debrided, vicryl mesh sutured to fascia circumferentially, xeroform over mesh then vac dressing placed	          SUBJECTIVE:  Flatus: [x] YES [] NO             Bowel Movement: [ x] YES [ ] NO  Pain (0-10):            Pain Control Adequate: [x ] YES [ ] NO  Nausea: [ ] YES [x ] NO            Vomiting: [ ] YES [x ] NO  Diarrhea: [ ] YES [x ] NO         Constipation: [ ] YES [ x] NO     Chest Pain: [ ] YES [x ] NO    SOB:  [ ] YES [ x] NO    enoxaparin Injectable 30 milliGRAM(s) SubCutaneous two times a day  heparin  flush 100 Units/mL Injectable 100 Unit(s) IV Push every other day  losartan 50 milliGRAM(s) Oral daily  vancomycin  IVPB 750 milliGRAM(s) IV Intermittent every 12 hours  vancomycin  IVPB          Vital Signs Last 24 Hrs  T(C): 37.4 (01 Nov 2017 05:45), Max: 37.4 (01 Nov 2017 05:45)  T(F): 99.4 (01 Nov 2017 05:45), Max: 99.4 (01 Nov 2017 05:45)  HR: 78 (01 Nov 2017 05:45) (71 - 86)  BP: 131/81 (01 Nov 2017 05:45) (124/79 - 151/89)  BP(mean): --  RR: 17 (01 Nov 2017 05:45) (16 - 20)  SpO2: 95% (01 Nov 2017 05:45) (94% - 95%)  I&O's Detail    31 Oct 2017 07:01  -  01 Nov 2017 07:00  --------------------------------------------------------  IN:    Solution: 150 mL    TPN (Total Parenteral Nutrition): 949 mL  Total IN: 1099 mL    OUT:    Drain: 200 mL    VAC (Vacuum Assisted Closure) System: 100 mL  Total OUT: 300 mL    Total NET: 799 mL          General: NAD, resting comfortably in bed  C/V: NSR  Pulm: Nonlabored breathing, no respiratory distress  Abd: soft, non-distended, TTP around wound vac site. Wound vac functioning   Extrem: WWP, no edema, SCDs in place        LABS:                        10.2   8.5   )-----------( 318      ( 01 Nov 2017 07:19 )             33.8     11-01    140  |  100  |  40<H>  ----------------------------<  122<H>  4.1   |  28  |  0.52    Ca    9.7      01 Nov 2017 07:13  Phos  3.5     11-01  Mg     2.0     11-01      PT/INR - ( 01 Nov 2017 07:19 )   PT: 13.5 sec;   INR: 1.21          PTT - ( 01 Nov 2017 07:19 )  PTT:36.2 sec      RADIOLOGY & ADDITIONAL STUDIES: OVERNIGHT EVENTS: BRAYDEN, VSS, vac reinforced and functional  10/31: wound vac functioning , VSS , Dr. Sumner  will  go through fistula to see distally and if possible the tube will be left distally  per Dr. Brady, [facility]     STATUS POST:   7/24: SBR resection, fistula resection, revision of esophagogegunostomy drain through esophageal hiatus in R mediastinum  7/29: Ex-lap, SB anastamosis in BP limb breakdown with succus throughout abdomen  8/1: abd washout, drainage of abscess, biologic mesh placement, closure of abdominal wall, vac and retention suture  8/7: abd washout, mesh removal, frozen abd noted, LLQ CHECO replaced with benson drain  8/10: leak noted from previous anastamosis site on L side, mid abdomen malecot drain placed in enterotomy, JPs x 2 placed, necrotic fascia debrided, vicryl mesh sutured to fascia circumferentially, xeroform over mesh then vac dressing placed	          SUBJECTIVE: Patient examined bedside. Patient complains of pain around vac site.  Flatus: [x] YES [] NO             Bowel Movement: [ x] YES [ ] NO  Pain (0-10):            Pain Control Adequate: [x ] YES [ ] NO  Nausea: [ ] YES [x ] NO            Vomiting: [ ] YES [x ] NO  Diarrhea: [ ] YES [x ] NO         Constipation: [ ] YES [ x] NO     Chest Pain: [ ] YES [x ] NO    SOB:  [ ] YES [ x] NO    enoxaparin Injectable 30 milliGRAM(s) SubCutaneous two times a day  heparin  flush 100 Units/mL Injectable 100 Unit(s) IV Push every other day  losartan 50 milliGRAM(s) Oral daily  vancomycin  IVPB 750 milliGRAM(s) IV Intermittent every 12 hours  vancomycin  IVPB          Vital Signs Last 24 Hrs  T(C): 37.4 (01 Nov 2017 05:45), Max: 37.4 (01 Nov 2017 05:45)  T(F): 99.4 (01 Nov 2017 05:45), Max: 99.4 (01 Nov 2017 05:45)  HR: 78 (01 Nov 2017 05:45) (71 - 86)  BP: 131/81 (01 Nov 2017 05:45) (124/79 - 151/89)  BP(mean): --  RR: 17 (01 Nov 2017 05:45) (16 - 20)  SpO2: 95% (01 Nov 2017 05:45) (94% - 95%)  I&O's Detail    31 Oct 2017 07:01  -  01 Nov 2017 07:00  --------------------------------------------------------  IN:    Solution: 150 mL    TPN (Total Parenteral Nutrition): 949 mL  Total IN: 1099 mL    OUT:    Drain: 200 mL    VAC (Vacuum Assisted Closure) System: 100 mL  Total OUT: 300 mL    Total NET: 799 mL          General: NAD, resting comfortably in bed  C/V: NSR  Pulm: Nonlabored breathing, no respiratory distress  Abd: soft, non-distended, TTP around wound vac site. Wound vac functioning   Extrem: WWP, no edema, SCDs in place        LABS:                        10.2   8.5   )-----------( 318      ( 01 Nov 2017 07:19 )             33.8     11-01    140  |  100  |  40<H>  ----------------------------<  122<H>  4.1   |  28  |  0.52    Ca    9.7      01 Nov 2017 07:13  Phos  3.5     11-01  Mg     2.0     11-01      PT/INR - ( 01 Nov 2017 07:19 )   PT: 13.5 sec;   INR: 1.21          PTT - ( 01 Nov 2017 07:19 )  PTT:36.2 sec      RADIOLOGY & ADDITIONAL STUDIES:

## 2017-11-02 LAB
ANION GAP SERPL CALC-SCNC: 11 MMOL/L — SIGNIFICANT CHANGE UP (ref 5–17)
BUN SERPL-MCNC: 35 MG/DL — HIGH (ref 7–23)
CALCIUM SERPL-MCNC: 10 MG/DL — SIGNIFICANT CHANGE UP (ref 8.4–10.5)
CHLORIDE SERPL-SCNC: 100 MMOL/L — SIGNIFICANT CHANGE UP (ref 96–108)
CO2 SERPL-SCNC: 28 MMOL/L — SIGNIFICANT CHANGE UP (ref 22–31)
CREAT SERPL-MCNC: 0.56 MG/DL — SIGNIFICANT CHANGE UP (ref 0.5–1.3)
GLUCOSE BLDC GLUCOMTR-MCNC: 113 MG/DL — HIGH (ref 70–99)
GLUCOSE BLDC GLUCOMTR-MCNC: 137 MG/DL — HIGH (ref 70–99)
GLUCOSE BLDC GLUCOMTR-MCNC: 143 MG/DL — HIGH (ref 70–99)
GLUCOSE BLDC GLUCOMTR-MCNC: 162 MG/DL — HIGH (ref 70–99)
GLUCOSE SERPL-MCNC: 107 MG/DL — HIGH (ref 70–99)
HCT VFR BLD CALC: 33.1 % — LOW (ref 34.5–45)
HGB BLD-MCNC: 10.2 G/DL — LOW (ref 11.5–15.5)
MAGNESIUM SERPL-MCNC: 2 MG/DL — SIGNIFICANT CHANGE UP (ref 1.6–2.6)
MCHC RBC-ENTMCNC: 28.8 PG — SIGNIFICANT CHANGE UP (ref 27–34)
MCHC RBC-ENTMCNC: 30.8 G/DL — LOW (ref 32–36)
MCV RBC AUTO: 93.5 FL — SIGNIFICANT CHANGE UP (ref 80–100)
PHOSPHATE SERPL-MCNC: 3.4 MG/DL — SIGNIFICANT CHANGE UP (ref 2.5–4.5)
PLATELET # BLD AUTO: 331 K/UL — SIGNIFICANT CHANGE UP (ref 150–400)
POTASSIUM SERPL-MCNC: 4 MMOL/L — SIGNIFICANT CHANGE UP (ref 3.5–5.3)
POTASSIUM SERPL-SCNC: 4 MMOL/L — SIGNIFICANT CHANGE UP (ref 3.5–5.3)
RBC # BLD: 3.54 M/UL — LOW (ref 3.8–5.2)
RBC # FLD: 17.3 % — HIGH (ref 10.3–16.9)
SODIUM SERPL-SCNC: 139 MMOL/L — SIGNIFICANT CHANGE UP (ref 135–145)
WBC # BLD: 7.3 K/UL — SIGNIFICANT CHANGE UP (ref 3.8–10.5)
WBC # FLD AUTO: 7.3 K/UL — SIGNIFICANT CHANGE UP (ref 3.8–10.5)

## 2017-11-02 RX ORDER — ACETAMINOPHEN 500 MG
1000 TABLET ORAL ONCE
Qty: 0 | Refills: 0 | Status: COMPLETED | OUTPATIENT
Start: 2017-11-02 | End: 2017-11-02

## 2017-11-02 RX ORDER — ELECTROLYTE SOLUTION,INJ
1 VIAL (ML) INTRAVENOUS
Qty: 0 | Refills: 0 | Status: DISCONTINUED | OUTPATIENT
Start: 2017-11-02 | End: 2017-11-02

## 2017-11-02 RX ORDER — I.V. FAT EMULSION 20 G/100ML
50 EMULSION INTRAVENOUS ONCE
Qty: 0 | Refills: 0 | Status: COMPLETED | OUTPATIENT
Start: 2017-11-02 | End: 2017-11-02

## 2017-11-02 RX ORDER — DIAZEPAM 5 MG
5 TABLET ORAL AT BEDTIME
Qty: 0 | Refills: 0 | Status: DISCONTINUED | OUTPATIENT
Start: 2017-11-03 | End: 2017-11-10

## 2017-11-02 RX ADMIN — LOSARTAN POTASSIUM 50 MILLIGRAM(S): 100 TABLET, FILM COATED ORAL at 06:22

## 2017-11-02 RX ADMIN — Medication 650 MILLIGRAM(S): at 22:00

## 2017-11-02 RX ADMIN — Medication 50 MILLIGRAM(S): at 21:31

## 2017-11-02 RX ADMIN — Medication 1000 MILLIGRAM(S): at 15:49

## 2017-11-02 RX ADMIN — Medication 400 MILLIGRAM(S): at 15:34

## 2017-11-02 RX ADMIN — Medication 1 APPLICATION(S): at 06:24

## 2017-11-02 RX ADMIN — ONDANSETRON 4 MILLIGRAM(S): 8 TABLET, FILM COATED ORAL at 13:17

## 2017-11-02 RX ADMIN — Medication 650 MILLIGRAM(S): at 21:31

## 2017-11-02 RX ADMIN — Medication 10 MILLIGRAM(S): at 06:22

## 2017-11-02 RX ADMIN — NYSTATIN CREAM 1 APPLICATION(S): 100000 CREAM TOPICAL at 17:47

## 2017-11-02 RX ADMIN — Medication 650 MILLIGRAM(S): at 06:22

## 2017-11-02 RX ADMIN — ONDANSETRON 4 MILLIGRAM(S): 8 TABLET, FILM COATED ORAL at 19:38

## 2017-11-02 RX ADMIN — Medication 5 MILLIGRAM(S): at 21:30

## 2017-11-02 RX ADMIN — Medication 100 UNIT(S): at 11:58

## 2017-11-02 RX ADMIN — Medication 650 MILLIGRAM(S): at 00:12

## 2017-11-02 RX ADMIN — PANTOPRAZOLE SODIUM 40 MILLIGRAM(S): 20 TABLET, DELAYED RELEASE ORAL at 06:24

## 2017-11-02 RX ADMIN — Medication 150 MILLIGRAM(S): at 11:58

## 2017-11-02 RX ADMIN — ONDANSETRON 4 MILLIGRAM(S): 8 TABLET, FILM COATED ORAL at 06:24

## 2017-11-02 RX ADMIN — Medication 2: at 13:17

## 2017-11-02 RX ADMIN — HYDROMORPHONE HYDROCHLORIDE 0.25 MILLIGRAM(S): 2 INJECTION INTRAMUSCULAR; INTRAVENOUS; SUBCUTANEOUS at 12:17

## 2017-11-02 RX ADMIN — NYSTATIN CREAM 1 APPLICATION(S): 100000 CREAM TOPICAL at 06:24

## 2017-11-02 RX ADMIN — Medication 150 MILLIGRAM(S): at 21:30

## 2017-11-02 RX ADMIN — ENOXAPARIN SODIUM 30 MILLIGRAM(S): 100 INJECTION SUBCUTANEOUS at 17:43

## 2017-11-02 RX ADMIN — Medication 1 EACH: at 17:43

## 2017-11-02 RX ADMIN — HYDROMORPHONE HYDROCHLORIDE 0.25 MILLIGRAM(S): 2 INJECTION INTRAMUSCULAR; INTRAVENOUS; SUBCUTANEOUS at 11:58

## 2017-11-02 RX ADMIN — ENOXAPARIN SODIUM 30 MILLIGRAM(S): 100 INJECTION SUBCUTANEOUS at 06:22

## 2017-11-02 RX ADMIN — OCTREOTIDE ACETATE 450 MICROGRAM(S): 200 INJECTION, SOLUTION INTRAVENOUS; SUBCUTANEOUS at 13:18

## 2017-11-02 RX ADMIN — Medication 10 MILLIGRAM(S): at 17:44

## 2017-11-02 RX ADMIN — ESCITALOPRAM OXALATE 20 MILLIGRAM(S): 10 TABLET, FILM COATED ORAL at 21:32

## 2017-11-02 RX ADMIN — I.V. FAT EMULSION 20.83 GRAM(S): 20 EMULSION INTRAVENOUS at 17:43

## 2017-11-02 NOTE — PROGRESS NOTE ADULT - SUBJECTIVE AND OBJECTIVE BOX
O/N: wet to dry dressing change,  vanco trough 11.4 750mg continued, VSS, BRAYDEN  11/1: Endoscopy for feeding tube placement, VSS O/N: wet to dry dressing change,  vanco trough 11.4 750mg continued, VSS, BRAYDEN  11/1: Endoscopy for feeding tube placement, VSS  OVERNIGHT EVENTS:    STATUS POST:      POST OPERATIVE DAY #:     SUBJECTIVE:  Flatus: [] YES [] NO             Bowel Movement: [ ] YES [ ] NO  Pain (0-10):            Pain Control Adequate: [ ] YES [ ] NO  Nausea: [ ] YES [ ] NO            Vomiting: [ ] YES [ ] NO  Diarrhea: [ ] YES [ ] NO         Constipation: [ ] YES [ ] NO     Chest Pain: [ ] YES [ ] NO    SOB:  [ ] YES [ ] NO    enoxaparin Injectable 30 milliGRAM(s) SubCutaneous two times a day  heparin  flush 100 Units/mL Injectable 100 Unit(s) IV Push every other day  losartan 50 milliGRAM(s) Oral daily  vancomycin  IVPB 750 milliGRAM(s) IV Intermittent every 12 hours  vancomycin  IVPB          Vital Signs Last 24 Hrs  T(C): 36.8 (02 Nov 2017 05:48), Max: 37 (01 Nov 2017 12:28)  T(F): 98.3 (02 Nov 2017 05:48), Max: 98.6 (01 Nov 2017 12:28)  HR: 77 (02 Nov 2017 05:48) (74 - 78)  BP: 125/77 (02 Nov 2017 05:48) (108/72 - 158/88)  BP(mean): --  RR: 17 (02 Nov 2017 05:48) (16 - 17)  SpO2: 94% (02 Nov 2017 05:48) (93% - 95%)  I&O's Detail    01 Nov 2017 07:01  -  02 Nov 2017 07:00  --------------------------------------------------------  IN:    Fat Emulsion 20%: 270.4 mL    TPN (Total Parenteral Nutrition): 1606 mL  Total IN: 1876.4 mL    OUT:  Total OUT: 0 mL    Total NET: 1876.4 mL          General: NAD, resting comfortably in bed  C/V: NSR  Pulm: Nonlabored breathing, no respiratory distress  Abd: soft, NT/ND.  Extrem: WWP, no edema, SCDs in place  Drains:  Yu:      LABS:                        10.2   8.5   )-----------( 318      ( 01 Nov 2017 07:19 )             33.8     11-01    140  |  100  |  40<H>  ----------------------------<  122<H>  4.1   |  28  |  0.52    Ca    9.7      01 Nov 2017 07:13  Phos  3.5     11-01  Mg     2.0     11-01      PT/INR - ( 01 Nov 2017 07:19 )   PT: 13.5 sec;   INR: 1.21          PTT - ( 01 Nov 2017 07:19 )  PTT:36.2 sec

## 2017-11-02 NOTE — PROGRESS NOTE ADULT - SUBJECTIVE AND OBJECTIVE BOX
Pt seen and examined at bedside. BRAYDEN overnight. No current complaints, denies abdominal pain, nausea, vomiting, fever, chills, CP, SOB.    Allergies    sulfa drugs (Unknown)  sulfamethoxazole (Other)  MEDICATIONS:  MEDICATIONS  (STANDING):  acetaminophen  IVPB. 1000 milliGRAM(s) IV Intermittent once  calcitriol Injectable 1 MICROGram(s) IV Push <User Schedule>  collagenase Ointment 1 Application(s) Topical daily  cyanocobalamin Injectable 1000 MICROGram(s) SubCutaneous every 7 days  dextrose 5%. 1000 milliLiter(s) (50 mL/Hr) IV Continuous <Continuous>  dextrose 50% Injectable 25 Gram(s) IV Push once  dextrose 50% Injectable 12.5 Gram(s) IV Push once  diazepam    Tablet 5 milliGRAM(s) Oral at bedtime  diphenhydrAMINE   Injectable 50 milliGRAM(s) IV Push at bedtime  enoxaparin Injectable 30 milliGRAM(s) SubCutaneous two times a day  escitalopram 20 milliGRAM(s) Oral daily  fat emulsion (Plant Based) 20% IVPB 50 Gram(s) IV Intermittent once  heparin  flush 100 Units/mL Injectable 100 Unit(s) IV Push every other day  ibuprofen  Tablet 400 milliGRAM(s) Oral once  insulin lispro (HumaLOG) corrective regimen sliding scale   SubCutaneous every 6 hours  losartan 50 milliGRAM(s) Oral daily  nystatin Powder 1 Application(s) Topical two times a day  octreotide  Injectable 450 MICROGram(s) IV Push daily  oxybutynin 10 milliGRAM(s) Oral two times a day  pantoprazole  Injectable 40 milliGRAM(s) IV Push daily  Parenteral Nutrition - Adult 1 Each (73 mL/Hr) TPN Continuous <Continuous>  Parenteral Nutrition - Adult 1 Each (73 mL/Hr) TPN Continuous <Continuous>  Parenteral Nutrition - Adult 1 Each (73 mL/Hr) TPN Continuous <Continuous>  sodium chloride 0.9% lock flush 20 milliLiter(s) IV Push once  vancomycin  IVPB 750 milliGRAM(s) IV Intermittent every 12 hours  vancomycin  IVPB        MEDICATIONS  (PRN):  acetaminophen   Tablet. 650 milliGRAM(s) Oral every 6 hours PRN Moderate Pain (4 - 6)  HYDROmorphone  Injectable 0.25 milliGRAM(s) IV Push daily PRN Severe Pain (7 - 10)  ondansetron Injectable 4 milliGRAM(s) IV Push every 6 hours PRN Nausea and/or Vomiting  sodium chloride 0.9% lock flush 10 milliLiter(s) IV Push every 1 hour PRN After each medication administration  sodium chloride 0.9% lock flush 10 milliLiter(s) IV Push every 12 hours PRN Lumen of catheter NOT used    Vital Signs Last 24 Hrs  T(C): 36.7 (02 Nov 2017 09:30), Max: 36.8 (02 Nov 2017 05:48)  T(F): 98 (02 Nov 2017 09:30), Max: 98.3 (02 Nov 2017 05:48)  HR: 73 (02 Nov 2017 09:30) (73 - 77)  BP: 120/79 (02 Nov 2017 09:30) (120/79 - 125/77)  BP(mean): --  RR: 17 (02 Nov 2017 09:30) (17 - 17)  SpO2: 95% (02 Nov 2017 09:30) (93% - 95%)    11-01 @ 07:01  -  11-02 @ 07:00  --------------------------------------------------------  IN: 1876.4 mL / OUT: 0 mL / NET: 1876.4 mL    PHYSICAL EXAM:    General: Well developed; well nourished; in no acute distress  HEENT: MMM, conjunctiva and sclera clear  Gastrointestinal: Soft non-tender non-distended;  No rebound or guarding; Anterior abdominal wound erythematous with no increased warmth, multiple enterocutaneous fistula opening noted.   Skin: Warm and dry.     LABS:  CBC Full  -  ( 02 Nov 2017 07:41 )  WBC Count : 7.3 K/uL  Hemoglobin : 10.2 g/dL  Hematocrit : 33.1 %  Platelet Count - Automated : 331 K/uL  Mean Cell Volume : 93.5 fL  Mean Cell Hemoglobin : 28.8 pg  Mean Cell Hemoglobin Concentration : 30.8 g/dL    11-02    139  |  100  |  35<H>  ----------------------------<  107<H>  4.0   |  28  |  0.56    Ca    10.0      02 Nov 2017 07:41  Phos  3.4     11-02  Mg     2.0     11-02    PT/INR - ( 01 Nov 2017 07:19 )   PT: 13.5 sec;   INR: 1.21       PTT - ( 01 Nov 2017 07:19 )  PTT:36.2 sec    RADIOLOGY & ADDITIONAL STUDIES: No new radiologic studies

## 2017-11-02 NOTE — PROGRESS NOTE ADULT - ASSESSMENT
55 YO F h/o HTN, gastric bypass in 2002 c/b stricture, corkscrewed sleeve and chronic leak, revised on 11/28/16 with protracted (70-day) postoperative course complicated by MDR infections, C-diff infection, respiratory compromise due to mucus plugging/PNA/pleural effusions requiring multiple interventions and a trach, as well as a continued leak requiring a draining double-pigtail with stenting performed by Dr. Sumner presents for enterocutaneous fistula resection with complicated hospital course and recurrent enterocutaneous fistula.    # Enterocutaneous fistula  - s/p enteroscopy showing multiple enterocutaneous fistulas and enter-entero fistulas  - Enterostomal therapy would likely hinder wound healing  - Further plan per Dr. Tolentino and primary team    Case d/w Dr. Sumner  GI will sign off at this time, please reconsult if further questions 55 YO F h/o HTN, gastric bypass in 2002 c/b stricture, corkscrewed sleeve and chronic leak, revised on 11/28/16 with protracted (70-day) postoperative course complicated by MDR infections, C-diff infection, respiratory compromise due to mucus plugging/PNA/pleural effusions requiring multiple interventions and a trach, as well as a continued leak requiring a draining double-pigtail with stenting performed by Dr. Sumner presents for enterocutaneous fistula resection with complicated hospital course and recurrent enterocutaneous fistula.    # Enterocutaneous fistula  - s/p enteroscopy showing multiple enterocutaneous fistulas and enter-entero fistulas  - Enterostomal therapy would likely hinder wound healing  - Per discussion with Dr. Sumner and Dr. Tolentino will attempt repeat enteroscopy with possible enteric tube placement  - Please keep pt NPO p MN    Case d/w Dr. Sumner

## 2017-11-03 LAB
ALBUMIN SERPL ELPH-MCNC: 3.1 G/DL — LOW (ref 3.3–5)
ANION GAP SERPL CALC-SCNC: 11 MMOL/L — SIGNIFICANT CHANGE UP (ref 5–17)
BUN SERPL-MCNC: 39 MG/DL — HIGH (ref 7–23)
CALCIUM SERPL-MCNC: 9.9 MG/DL — SIGNIFICANT CHANGE UP (ref 8.4–10.5)
CHLORIDE SERPL-SCNC: 102 MMOL/L — SIGNIFICANT CHANGE UP (ref 96–108)
CO2 SERPL-SCNC: 27 MMOL/L — SIGNIFICANT CHANGE UP (ref 22–31)
CREAT SERPL-MCNC: 0.52 MG/DL — SIGNIFICANT CHANGE UP (ref 0.5–1.3)
GLUCOSE BLDC GLUCOMTR-MCNC: 109 MG/DL — HIGH (ref 70–99)
GLUCOSE BLDC GLUCOMTR-MCNC: 119 MG/DL — HIGH (ref 70–99)
GLUCOSE BLDC GLUCOMTR-MCNC: 119 MG/DL — HIGH (ref 70–99)
GLUCOSE BLDC GLUCOMTR-MCNC: 120 MG/DL — HIGH (ref 70–99)
GLUCOSE BLDC GLUCOMTR-MCNC: 130 MG/DL — HIGH (ref 70–99)
GLUCOSE SERPL-MCNC: 118 MG/DL — HIGH (ref 70–99)
HCT VFR BLD CALC: 32.6 % — LOW (ref 34.5–45)
HGB BLD-MCNC: 10 G/DL — LOW (ref 11.5–15.5)
MAGNESIUM SERPL-MCNC: 2 MG/DL — SIGNIFICANT CHANGE UP (ref 1.6–2.6)
MCHC RBC-ENTMCNC: 28.4 PG — SIGNIFICANT CHANGE UP (ref 27–34)
MCHC RBC-ENTMCNC: 30.7 G/DL — LOW (ref 32–36)
MCV RBC AUTO: 92.6 FL — SIGNIFICANT CHANGE UP (ref 80–100)
PHOSPHATE SERPL-MCNC: 3.1 MG/DL — SIGNIFICANT CHANGE UP (ref 2.5–4.5)
PLATELET # BLD AUTO: 284 K/UL — SIGNIFICANT CHANGE UP (ref 150–400)
POTASSIUM SERPL-MCNC: 4 MMOL/L — SIGNIFICANT CHANGE UP (ref 3.5–5.3)
POTASSIUM SERPL-SCNC: 4 MMOL/L — SIGNIFICANT CHANGE UP (ref 3.5–5.3)
PREALB SERPL-MCNC: 31 MG/DL — SIGNIFICANT CHANGE UP (ref 20–40)
RBC # BLD: 3.52 M/UL — LOW (ref 3.8–5.2)
RBC # FLD: 17.1 % — HIGH (ref 10.3–16.9)
SODIUM SERPL-SCNC: 140 MMOL/L — SIGNIFICANT CHANGE UP (ref 135–145)
VANCOMYCIN TROUGH SERPL-MCNC: 14.5 UG/ML — SIGNIFICANT CHANGE UP (ref 10–20)
WBC # BLD: 6.5 K/UL — SIGNIFICANT CHANGE UP (ref 3.8–10.5)
WBC # FLD AUTO: 6.5 K/UL — SIGNIFICANT CHANGE UP (ref 3.8–10.5)

## 2017-11-03 PROCEDURE — 99233 SBSQ HOSP IP/OBS HIGH 50: CPT

## 2017-11-03 PROCEDURE — 44382 SMALL BOWEL ENDOSCOPY: CPT

## 2017-11-03 RX ORDER — ELECTROLYTE SOLUTION,INJ
1 VIAL (ML) INTRAVENOUS
Qty: 0 | Refills: 0 | Status: DISCONTINUED | OUTPATIENT
Start: 2017-11-03 | End: 2017-11-03

## 2017-11-03 RX ADMIN — OCTREOTIDE ACETATE 450 MICROGRAM(S): 200 INJECTION, SOLUTION INTRAVENOUS; SUBCUTANEOUS at 12:54

## 2017-11-03 RX ADMIN — ONDANSETRON 4 MILLIGRAM(S): 8 TABLET, FILM COATED ORAL at 12:53

## 2017-11-03 RX ADMIN — Medication 5 MILLIGRAM(S): at 22:03

## 2017-11-03 RX ADMIN — NYSTATIN CREAM 1 APPLICATION(S): 100000 CREAM TOPICAL at 06:58

## 2017-11-03 RX ADMIN — ENOXAPARIN SODIUM 30 MILLIGRAM(S): 100 INJECTION SUBCUTANEOUS at 17:48

## 2017-11-03 RX ADMIN — Medication 150 MILLIGRAM(S): at 22:04

## 2017-11-03 RX ADMIN — ESCITALOPRAM OXALATE 20 MILLIGRAM(S): 10 TABLET, FILM COATED ORAL at 22:02

## 2017-11-03 RX ADMIN — ENOXAPARIN SODIUM 30 MILLIGRAM(S): 100 INJECTION SUBCUTANEOUS at 06:55

## 2017-11-03 RX ADMIN — NYSTATIN CREAM 1 APPLICATION(S): 100000 CREAM TOPICAL at 17:48

## 2017-11-03 RX ADMIN — Medication 650 MILLIGRAM(S): at 04:35

## 2017-11-03 RX ADMIN — Medication 650 MILLIGRAM(S): at 03:49

## 2017-11-03 RX ADMIN — ONDANSETRON 4 MILLIGRAM(S): 8 TABLET, FILM COATED ORAL at 19:37

## 2017-11-03 RX ADMIN — Medication 50 MILLIGRAM(S): at 22:02

## 2017-11-03 RX ADMIN — Medication 1 EACH: at 17:48

## 2017-11-03 RX ADMIN — LOSARTAN POTASSIUM 50 MILLIGRAM(S): 100 TABLET, FILM COATED ORAL at 06:55

## 2017-11-03 RX ADMIN — PANTOPRAZOLE SODIUM 40 MILLIGRAM(S): 20 TABLET, DELAYED RELEASE ORAL at 06:55

## 2017-11-03 RX ADMIN — CALCITRIOL 1 MICROGRAM(S): 0.5 CAPSULE ORAL at 12:52

## 2017-11-03 RX ADMIN — Medication 10 MILLIGRAM(S): at 06:55

## 2017-11-03 RX ADMIN — Medication 150 MILLIGRAM(S): at 12:52

## 2017-11-03 RX ADMIN — Medication 650 MILLIGRAM(S): at 19:37

## 2017-11-03 RX ADMIN — ONDANSETRON 4 MILLIGRAM(S): 8 TABLET, FILM COATED ORAL at 02:09

## 2017-11-03 RX ADMIN — Medication 650 MILLIGRAM(S): at 20:45

## 2017-11-03 RX ADMIN — HYDROMORPHONE HYDROCHLORIDE 0.25 MILLIGRAM(S): 2 INJECTION INTRAMUSCULAR; INTRAVENOUS; SUBCUTANEOUS at 15:12

## 2017-11-03 RX ADMIN — HYDROMORPHONE HYDROCHLORIDE 0.25 MILLIGRAM(S): 2 INJECTION INTRAMUSCULAR; INTRAVENOUS; SUBCUTANEOUS at 14:55

## 2017-11-03 RX ADMIN — Medication 1 APPLICATION(S): at 06:57

## 2017-11-03 NOTE — CHART NOTE - NSCHARTNOTEFT_GEN_A_CORE
Admitting Diagnosis:   Patient is a 57y old  Female who presents with a chief complaint of enterocutaneous fistula (24 Jul 2017 14:15)  57 F s/p  SBR, fistula resection, esophagojejunostomy revision w/ post-op course complicated by RTOR for distal anastomosis breakdown, ATN, acute respiratory failure, & septic shock, MRSA bacteremia which resolved. Currently for wound care management.   Plan for fistuloscope today with     PAST MEDICAL & SURGICAL HISTORY:  Reflux  Obesity  DVT (deep venous thrombosis): 2013 neck  Diverticulitis  Hypertension  Elective surgery: abodominal wall surgery  dec 2016  Elective surgery: NOV 2016 gastric bypass revision  Gastric bypass status for obesity: gastric sleeve, 6/2012  S/P breast biopsy: 2011, left  S/P colon resection: 2011  S/P knee surgery: repair 2009, 2011  right; left  Umbilical hernia: 2000  S/P appendectomy: 1975  S/P tonsillectomy: 1967      Current Nutrition Order:  NPO    TPN via central line 73mL/hr:  355g D,  160g AA, 50g 20% lipids. Providing pt with 2347 kcal, 3.1g kg IBW protein, 1752 mL fluid. GIR = 3.3.    PO Intake: Good (%) [   ]  Fair (50-75%) [   ] Poor (<25%) [   ] N/A    GI Issues: Pt reports no GI distress at present. Nausea at times.  plan for repeat enteroscopy with possible enteric tube placement    Pain: Pain controlled.     Skin Integrity:  Plan for fistulascope today. s/p enteroscopy showing multiple enterocutaneous fistulas and enter-entero fistulas.  fistula manager - change once/2 times a week unless severely leak.  Vac reinforced and functional 11/1       Labs:   11-03    140  |  102  |  39<H>  ----------------------------<  118<H>  4.0   |  27  |  0.52    Ca    9.9      03 Nov 2017 07:20  Phos  3.1     11-03  Mg     2.0     11-03      CAPILLARY BLOOD GLUCOSE      POCT Blood Glucose.: 120 mg/dL (03 Nov 2017 06:17)  POCT Blood Glucose.: 143 mg/dL (02 Nov 2017 23:48)  POCT Blood Glucose.: 119 mg/dL (02 Nov 2017 17:02)  POCT Blood Glucose.: 162 mg/dL (02 Nov 2017 13:06)      Medications:  MEDICATIONS  (STANDING):  calcitriol Injectable 1 MICROGram(s) IV Push <User Schedule>  collagenase Ointment 1 Application(s) Topical daily  cyanocobalamin Injectable 1000 MICROGram(s) SubCutaneous every 7 days  dextrose 5%. 1000 milliLiter(s) (50 mL/Hr) IV Continuous <Continuous>  dextrose 50% Injectable 25 Gram(s) IV Push once  dextrose 50% Injectable 12.5 Gram(s) IV Push once  diphenhydrAMINE   Injectable 50 milliGRAM(s) IV Push at bedtime  enoxaparin Injectable 30 milliGRAM(s) SubCutaneous two times a day  escitalopram 20 milliGRAM(s) Oral daily  heparin  flush 100 Units/mL Injectable 100 Unit(s) IV Push every other day  ibuprofen  Tablet 400 milliGRAM(s) Oral once  insulin lispro (HumaLOG) corrective regimen sliding scale   SubCutaneous every 6 hours  losartan 50 milliGRAM(s) Oral daily  nystatin Powder 1 Application(s) Topical two times a day  octreotide  Injectable 450 MICROGram(s) IV Push daily  oxybutynin 10 milliGRAM(s) Oral two times a day  pantoprazole  Injectable 40 milliGRAM(s) IV Push daily  Parenteral Nutrition - Adult 1 Each (73 mL/Hr) TPN Continuous <Continuous>  Parenteral Nutrition - Adult 1 Each (73 mL/Hr) TPN Continuous <Continuous>  sodium chloride 0.9% lock flush 20 milliLiter(s) IV Push once  vancomycin  IVPB 750 milliGRAM(s) IV Intermittent every 12 hours  vancomycin  IVPB        MEDICATIONS  (PRN):  acetaminophen   Tablet. 650 milliGRAM(s) Oral every 6 hours PRN Moderate Pain (4 - 6)  HYDROmorphone  Injectable 0.25 milliGRAM(s) IV Push daily PRN Severe Pain (7 - 10)  ondansetron Injectable 4 milliGRAM(s) IV Push every 6 hours PRN Nausea and/or Vomiting  sodium chloride 0.9% lock flush 10 milliLiter(s) IV Push every 1 hour PRN After each medication administration  sodium chloride 0.9% lock flush 10 milliLiter(s) IV Push every 12 hours PRN Lumen of catheter NOT used    weekly albumin, pre-albumin and triglyceride (every friday)  Albumin 2.6 10/24  Pre-alb WNL 10/27  triglycerides 179  10/31    Weight:  New wt taken 10/17: 164lbs/74.6kg     Weight Change:   163[facility] RN obtaining standing wt during PT.  164lb (10/17)  219lb (8/1)    Indicates a 55lb wt loss in 2.5 months. (25% original BW)- However, suspect inaccuracy of documented wt from 8/1 2/2 Wt being relatively stable over last year (see below)  March 2016: 89kg  February 2017: 74kg   July 2017: 76kg  Please continue to obtain wts for trending. Discussed w/ RN    Estimated energy needs using 52 kg IBW:  increased needs per wound and pressure ulcer healing  30-35 kcal/kg (7092-2766 kcal).   1.8-2 g/kg ( g protein).  30-35 mL/kg (4071-5192 mL fluid).     Subjective: New TPN order in for 355g D,  160g AA, 50g 20% lipids.  ( 2347 kcal, 3.1g kg IBW protein, 1752 mL fluid. GIR = 3.3). Denies GI distress. Nausea at times. Suspect pt is getting excess parenteral nutrition. [facility] wt showed 163lbs. Please obtain recent wt to further trend and adjust nutrient needs accordingly. Possible plan for distal EN feeds in near future. Please continue to check pre-alb ad triglycerides.     Previous Nutrition Diagnosis: Increased nutrient needs RT increased demand for nutrients AEB wound and pressure ulcer healing   Active [  X]  Resolved [   ]    Goal: PT to meet >75% EER via tolerated route    Recommendations:  1) Recommend 110g AA, 312g Dex, 50g 20% lipids 3-4x/day (2000kcal, 2.1g pro/kg IBW, GIR 2.19).  2) Please continue to take standing weight for trending purposes  3) PO diet per MD discretion   4) Continue to check labs: triglycerides, BG & FS and adjust TPN per MD discretion  5) Check labs for signs of fat malabsorption, consider ADEK vitamin.    Education: Understands meeting nutrient needs via TPN.    Risk Level: High [ X  ] Moderate [   ] Low [   ].

## 2017-11-03 NOTE — CONSULT NOTE ADULT - PROBLEM SELECTOR RECOMMENDATION 9
- the patient with elevation of BUN   - THE RENAL FUNCTION IS NORMAL IN THE MOMENT OF THE CONSULT   - urea is not a good indicator of renal function   - there are several possibilities related to elevation of the urea  - poor oral intake ( for a couple of months she is NPO) - pre dominant catabolic events   - also - on TPN - consider Dietitian consult and decreasing the amino acid in the TPN   - also poor fluid intake - please consider increasing fluid intake - iv   - the renal service will sign off of the case
CAT scan of the abdomen  Blood and Urine cultures  Colistin  Micafungin and Linezolid
-Per Dr. Baird, keep pt NPO for about 4-5 days.  We will follow daily.  -Recommend getting barium esophageal esophagram later this week (?Friday). Will follow and give more definitive recommendation when it gets closer.   -Pain management and other medical management per primary team.  -Follow CHECO drain output.

## 2017-11-03 NOTE — PROGRESS NOTE ADULT - ASSESSMENT
57 F s/p  SBR, fistula resection, esophagojejunostomy revision w/ post-op course complicated by RTOR for distal anastomosis breakdown, ATN, acute respiratory failure, & septic shock, MRSA bacteremia which resolved. Currently for wound care management.     Plan:  NPO with sips and chips /TPN/IVF  Pain/nausea control - only benadryl at midnight and dilaudid 0.25mg during PT session  and wound change only  Daily wound VAC change   ISS/OOB with PT daily  Nystatin powder, vancomycin 1250 Q12 (10/11-11/8)  SCDs, lovenox, OOBA  Lines :  L left subclavian line (10/4-- )  AM labs, weekly albumin, pre-albumin and triglyceride (every friday) 57 F s/p  SBR, fistula resection, esophagojejunostomy revision w/ post-op course complicated by RTOR for distal anastomosis breakdown, ATN, acute respiratory failure, & septic shock, MRSA bacteremia which resolved. Currently for wound care management.     Plan:  For fistuloscope today with   NPO with sips and chips /TPN/IVF  Pain/nausea control - only benadryl at midnight and dilaudid 0.25mg during PT session  and wound change only  Daily wound VAC change   ISS/OOB with PT daily  Nystatin powder, vancomycin 1250 Q12 (10/11-11/8)  SCDs, lovenox, OOBA  Lines :  L left subclavian line (10/4-- )  AM labs, weekly albumin, pre-albumin and triglyceride (every friday) 57 F s/p  SBR, fistula resection, esophagojejunostomy revision w/ post-op course complicated by RTOR for distal anastomosis breakdown, ATN, acute respiratory failure, & septic shock, MRSA bacteremia which resolved. Currently for wound care management.     Plan:  For fistuloscope today with   NPO with sips and chips /TPN/IVF  Pain/nausea control - only benadryl at midnight and dilaudid 0.25mg during PT session  and wound change only  Daily wound VAC change   ISS/OOB with PT daily  Nystatin powder, vancomycin 750mg BID (10/11-11/8)  SCDs, lovenox, OOBA  Lines :  L left subclavian line (10/4-- )  AM labs, weekly albumin, pre-albumin and triglyceride (every friday)    Vanco trough today @10am

## 2017-11-03 NOTE — PROGRESS NOTE BEHAVIORAL HEALTH - NSBHFUPINTERVALHXFT_PSY_A_CORE
Patient seen at bedside with psychology intern Mark Murdock.  Patient was seen by Mark Murdock on 10/30 and evaluated by neuropsychologist Dr. Garsia yesterday.    From session with Mark Murdock 10/30: "Pt reported a “good” mood, emphasizing positive events of the past week.  Pt displayed affect congruent to mood, reporting to Writer that she had seen a manicurist and  over the weekend, arranged by her treatment team.  Writer attempted to elicit particular goals from Pt, suggesting goals related to PT and use of Dilaudid.  Pt denied concerns about these issues, expressing only distress about the timeline of her discharge, as well as whether or not she would be able to eat after her surgery.  Writer obtained consent to speak with Pt’s  for collateral information useful in developing more specific bedside therapy goals.    Writer spoke with Pt’s , Edvin Alatorre, who reported concerns about Pt’s memory as well as her “manipulative behavior.”  Mr. Alatorre remarked that his wife is a “wonderful” and “caring” woman who is a life-long “goodie-two-shoes.”  However, Mr. Alatorre noted changes in Pt’s personality after her first surgery, focusing on manipulative behavior and forgetfulness.  Mr. Alatorre stated that they had completed a neuropsychiatric evaluation prior to the current hospitalization, but no conclusive findings were obtained.  Mr. Alatorre also identified Pt’s likely “eating disorder,” her “anxiety,” and “manipulation” of tx team for dilaudid as concerning developments.  Mr. Alatorre expressed uncertainty about the accuracy of Pt’s self-report, noting that Pt may be concealing concerns and problems from herself as well as others.  Writer recommended consultation with neuropsychologist if available on Thursday."    Patient scored 22/30 on MOCA.    On interview today, patient expressed some frustration with hospitalization; disappointed she will miss granddaughter's birthday party next week.  Also reports 's cousin  2 days ago and she is disappointed she is not able to mourn with her family.  Described his death in hospice at home; says "no one would want to die in a cold hospital room."  Patient does not have particular concerns about her memory; says "I remember the things I need to."  Says she had a great session with PT yesterday. Patient seen at bedside with psychology intern Mark Murdock.  Patient was seen by Mark Murdock on 10/30 and evaluated by neuropsychologist Dr. Garsia yesterday.    From session with Mark Murdock 10/30: "Pt reported a “good” mood, emphasizing positive events of the past week.  Pt displayed affect congruent to mood, reporting to Writer that she had seen a manicurist and  over the weekend, arranged by her treatment team.  Writer attempted to elicit particular goals from Pt, suggesting goals related to PT and use of Dilaudid.  Pt denied concerns about these issues, expressing only distress about the timeline of her discharge, as well as whether or not she would be able to eat after her surgery.  Writer obtained consent to speak with Pt’s  for collateral information useful in developing more specific bedside therapy goals.    Writer spoke with Pt’s , Edvin Alatorre, who reported concerns about Pt’s memory as well as her “manipulative behavior.”  Mr. Alatorre remarked that his wife is a “wonderful” and “caring” woman who is a life-long “goodie-two-shoes.”  However, Mr. Alatorre noted changes in Pt’s personality after her first surgery, focusing on manipulative behavior and forgetfulness.  Mr. Alatorre stated that they had completed a neuropsychiatric evaluation prior to the current hospitalization, but no conclusive findings were obtained.  Mr. Alatorre also identified Pt’s likely “eating disorder,” her “anxiety,” and “manipulation” of tx team for dilaudid as concerning developments.  Mr. Alatorre expressed uncertainty about the accuracy of Pt’s self-report, noting that Pt may be concealing concerns and problems from herself as well as others.  Writer recommended consultation with neuropsychologist if available on Thursday."    Patient scored 22/30 on MOCA.    On interview today, patient expressed some frustration with hospitalization; disappointed she will miss granddaughter's birthday party next week.  Also reports 's cousin  2 days ago and she is disappointed she is not able to mourn with her family.  Described his death in hospice at home; says "no one would want to die in a cold hospital room."  Disappointed more of her coworkers have not asked about her.  Patient does not have particular concerns about her memory; says "I remember the things I need to."  Reports she had neuropsych testing prior to bariatric surgery revision last November; does not know results.  Says she had a great session with PT yesterday.

## 2017-11-03 NOTE — PROGRESS NOTE BEHAVIORAL HEALTH - SUMMARY
57F reported history of anxiety on Lexapro, no prior psych history, PMH gastric bypass surgery with multiple complications, currently admitted for extended period of time due to fistulas, reported SI while frustrated on 10/5 evening, has since consistently denied.  Psychiatry and psychology following 2-3 times weekly for support.    Patient appears to have been engaging with PT more regularly recently.  Some concern as per  that patient has some underlying cognitive impairment which may be contributing to her presentation as seemingly unconcerned with her condition and lack of motivation to participate in recovery.  Patient today though expressing some feelings of discontent which is different from typical lack of engagement.

## 2017-11-03 NOTE — PROGRESS NOTE ADULT - SUBJECTIVE AND OBJECTIVE BOX
discussing with dr sotomayor possibility of placing tube  bun is incrreasing cr is normal this can be from the increased tpn  will check albumin and pre albumin and get 24 hour urine for nitrogen balance and have renal see  otherwise continue present management

## 2017-11-03 NOTE — CONSULT NOTE ADULT - SUBJECTIVE AND OBJECTIVE BOX
This is 57 year old female s/p Bypass - sleeve, with multiple abdominal surgeries after that, on TPN. Now the renal service is activated for Elevation of the BUN. The patient seen in her bed, comfortable, does not endorse any complains - mild abdominal pain. No shortness of breath, no fever, no swelling of the legs. denies any GI bleed        Patient is a 57y Female admitted for     PAST MEDICAL & SURGICAL HISTORY:  Reflux  Obesity  DVT (deep venous thrombosis): 2013 neck  Diverticulitis  Hypertension  Elective surgery: abodominal wall surgery  dec 2016  Elective surgery: NOV 2016 gastric bypass revision  Gastric bypass status for obesity: gastric sleeve, 6/2012  S/P breast biopsy: 2011, left  S/P colon resection: 2011  S/P knee surgery: repair 2009, 2011  right; left  Umbilical hernia: 2000  S/P appendectomy: 1975  S/P tonsillectomy: 1967      MEDICATIONS  (STANDING):  calcitriol Injectable 1 MICROGram(s) IV Push <User Schedule>  collagenase Ointment 1 Application(s) Topical daily  cyanocobalamin Injectable 1000 MICROGram(s) SubCutaneous every 7 days  dextrose 5%. 1000 milliLiter(s) (50 mL/Hr) IV Continuous <Continuous>  dextrose 50% Injectable 25 Gram(s) IV Push once  dextrose 50% Injectable 12.5 Gram(s) IV Push once  diphenhydrAMINE   Injectable 50 milliGRAM(s) IV Push at bedtime  enoxaparin Injectable 30 milliGRAM(s) SubCutaneous two times a day  escitalopram 20 milliGRAM(s) Oral daily  heparin  flush 100 Units/mL Injectable 100 Unit(s) IV Push every other day  ibuprofen  Tablet 400 milliGRAM(s) Oral once  insulin lispro (HumaLOG) corrective regimen sliding scale   SubCutaneous every 6 hours  losartan 50 milliGRAM(s) Oral daily  nystatin Powder 1 Application(s) Topical two times a day  octreotide  Injectable 450 MICROGram(s) IV Push daily  oxybutynin 10 milliGRAM(s) Oral two times a day  pantoprazole  Injectable 40 milliGRAM(s) IV Push daily  Parenteral Nutrition - Adult 1 Each (73 mL/Hr) TPN Continuous <Continuous>  Parenteral Nutrition - Adult 1 Each (73 mL/Hr) TPN Continuous <Continuous>  sodium chloride 0.9% lock flush 20 milliLiter(s) IV Push once  vancomycin  IVPB 750 milliGRAM(s) IV Intermittent every 12 hours  vancomycin  IVPB        MEDICATIONS  (PRN):  acetaminophen   Tablet. 650 milliGRAM(s) Oral every 6 hours PRN Moderate Pain (4 - 6)  ondansetron Injectable 4 milliGRAM(s) IV Push every 6 hours PRN Nausea and/or Vomiting  sodium chloride 0.9% lock flush 10 milliLiter(s) IV Push every 1 hour PRN After each medication administration  sodium chloride 0.9% lock flush 10 milliLiter(s) IV Push every 12 hours PRN Lumen of catheter NOT used      Allergies    sulfa drugs (Unknown)  sulfamethoxazole (Other)    Intolerances        SOCIAL HISTORY:    FAMILY HISTORY:  No pertinent family history in first degree relatives      T(C): , Max: 37.4 (11-03-17 @ 13:40)  T(F): , Max: 99.3 (11-03-17 @ 13:40)  HR: 77 (11-03-17 @ 17:48)  BP: 129/81 (11-03-17 @ 17:48)  BP(mean): --  RR: 17 (11-03-17 @ 17:48)  SpO2: 95% (11-03-17 @ 17:48)  Wt(kg): --    11-02 @ 07:01  -  11-03 @ 07:00  --------------------------------------------------------  IN: 2037 mL / OUT: 800 mL / NET: 1237 mL      Physical exam"   alert and oriented   No JVD   Normal air entry into the lungs, no wheezing, no crackles   RRR, normal s1/s2, no murmurs, rubs or gallops  Abdomen - s/p multiple surgeries, bandages applied, drainage   Extremities: no edema       LABS:                        10.0   6.5   )-----------( 284      ( 03 Nov 2017 07:19 )             32.6     11-03    140  |  102  |  39<H>  ----------------------------<  118<H>  4.0   |  27  |  0.52    Ca    9.9      03 Nov 2017 07:20  Phos  3.1     11-03  Mg     2.0     11-03    TPro  x   /  Alb  3.1<L>  /  TBili  x   /  DBili  x   /  AST  x   /  ALT  x   /  AlkPhos  x   11-03                RADIOLOGY & ADDITIONAL STUDIES:

## 2017-11-03 NOTE — PROGRESS NOTE ADULT - SUBJECTIVE AND OBJECTIVE BOX
O/N: NPO midnight, VSS, BRAYDEN  11/2: wet to dry dressing, Taisha will rescope tomorrow O/N: NPO midnight, VSS, BRAYDEN  11/2: wet to dry dressing, Taisha will rescope tomorrow     STATUS POST:    7/24: SBR resection, fistula resection, revision of esophagogegunostomy drain through esophageal hiatus in R mediastinum  7/29: Ex-lap, SB anastamosis in BP limb breakdown with succus throughout abdomen  8/1: abd washout, drainage of abscess, biologic mesh placement, closure of abdominal wall, vac and retention suture  8/7: abd washout, mesh removal, frozen abd noted, LLQ CHECO replaced with benson drain  8/10: leak noted from previous anastamosis site on L side, mid abdomen malecot drain placed in enterotomy, JPs x 2 placed, necrotic fascia debrided, vicryl mesh sutured to fascia circumferentially, xeroform over mesh then vac dressing placed	       SUBJECTIVE: Patient seen and examined bedside by chief resident. No nausea/vomiting, no abd pain. no other complains    enoxaparin Injectable 30 milliGRAM(s) SubCutaneous two times a day  heparin  flush 100 Units/mL Injectable 100 Unit(s) IV Push every other day  losartan 50 milliGRAM(s) Oral daily  vancomycin  IVPB 750 milliGRAM(s) IV Intermittent every 12 hours  vancomycin  IVPB          Vital Signs Last 24 Hrs  T(C): 37.2 (03 Nov 2017 05:54), Max: 37.2 (03 Nov 2017 05:54)  T(F): 99 (03 Nov 2017 05:54), Max: 99 (03 Nov 2017 05:54)  HR: 73 (03 Nov 2017 05:54) (66 - 91)  BP: 139/84 (03 Nov 2017 05:54) (116/78 - 145/87)  BP(mean): --  RR: 16 (03 Nov 2017 05:54) (16 - 17)  SpO2: 95% (03 Nov 2017 05:54) (94% - 95%)  I&O's Detail    02 Nov 2017 07:01  -  03 Nov 2017 07:00  --------------------------------------------------------  IN:    Fat Emulsion 20%: 208 mL    Solution: 150 mL    TPN (Total Parenteral Nutrition): 1679 mL  Total IN: 2037 mL    OUT:    Voided: 800 mL  Total OUT: 800 mL    Total NET: 1237 mL    General: NAD, resting comfortably in bed  C/V: NSR  Pulm: Nonlabored breathing, no respiratory distress  Abd: soft, NT/ND. wound size 10cm x 13cm, leaking  Extrem: WWP, no edema, SCDs in place        LABS:                        10.0   6.5   )-----------( 284      ( 03 Nov 2017 07:19 )             32.6     11-03    140  |  102  |  39<H>  ----------------------------<  118<H>  4.0   |  27  |  0.52    Ca    9.9      03 Nov 2017 07:20  Phos  3.1     11-03  Mg     2.0     11-03            RADIOLOGY & ADDITIONAL STUDIES:

## 2017-11-03 NOTE — PROGRESS NOTE ADULT - SUBJECTIVE AND OBJECTIVE BOX
On interval followup, the left subclavian venous catheter site is clean, dry and non-inflamed. T99 range. Notes and cultures are followed.

## 2017-11-04 LAB
ANION GAP SERPL CALC-SCNC: 10 MMOL/L — SIGNIFICANT CHANGE UP (ref 5–17)
BUN SERPL-MCNC: 40 MG/DL — HIGH (ref 7–23)
CALCIUM SERPL-MCNC: 9.7 MG/DL — SIGNIFICANT CHANGE UP (ref 8.4–10.5)
CHLORIDE SERPL-SCNC: 101 MMOL/L — SIGNIFICANT CHANGE UP (ref 96–108)
CO2 SERPL-SCNC: 30 MMOL/L — SIGNIFICANT CHANGE UP (ref 22–31)
CREAT SERPL-MCNC: 0.53 MG/DL — SIGNIFICANT CHANGE UP (ref 0.5–1.3)
GLUCOSE BLDC GLUCOMTR-MCNC: 111 MG/DL — HIGH (ref 70–99)
GLUCOSE BLDC GLUCOMTR-MCNC: 128 MG/DL — HIGH (ref 70–99)
GLUCOSE BLDC GLUCOMTR-MCNC: 129 MG/DL — HIGH (ref 70–99)
GLUCOSE SERPL-MCNC: 118 MG/DL — HIGH (ref 70–99)
HCT VFR BLD CALC: 31.4 % — LOW (ref 34.5–45)
HGB BLD-MCNC: 9.8 G/DL — LOW (ref 11.5–15.5)
MAGNESIUM SERPL-MCNC: 2 MG/DL — SIGNIFICANT CHANGE UP (ref 1.6–2.6)
MCHC RBC-ENTMCNC: 29.1 PG — SIGNIFICANT CHANGE UP (ref 27–34)
MCHC RBC-ENTMCNC: 31.2 G/DL — LOW (ref 32–36)
MCV RBC AUTO: 93.2 FL — SIGNIFICANT CHANGE UP (ref 80–100)
PHOSPHATE SERPL-MCNC: 3.4 MG/DL — SIGNIFICANT CHANGE UP (ref 2.5–4.5)
PLATELET # BLD AUTO: 300 K/UL — SIGNIFICANT CHANGE UP (ref 150–400)
POTASSIUM SERPL-MCNC: 4.2 MMOL/L — SIGNIFICANT CHANGE UP (ref 3.5–5.3)
POTASSIUM SERPL-SCNC: 4.2 MMOL/L — SIGNIFICANT CHANGE UP (ref 3.5–5.3)
RBC # BLD: 3.37 M/UL — LOW (ref 3.8–5.2)
RBC # FLD: 17.3 % — HIGH (ref 10.3–16.9)
SODIUM SERPL-SCNC: 141 MMOL/L — SIGNIFICANT CHANGE UP (ref 135–145)
VANCOMYCIN TROUGH SERPL-MCNC: 17.8 UG/ML — SIGNIFICANT CHANGE UP (ref 10–20)
WBC # BLD: 6 K/UL — SIGNIFICANT CHANGE UP (ref 3.8–10.5)
WBC # FLD AUTO: 6 K/UL — SIGNIFICANT CHANGE UP (ref 3.8–10.5)

## 2017-11-04 PROCEDURE — 74000: CPT | Mod: 26,76

## 2017-11-04 RX ORDER — I.V. FAT EMULSION 20 G/100ML
50 EMULSION INTRAVENOUS ONCE
Qty: 0 | Refills: 0 | Status: COMPLETED | OUTPATIENT
Start: 2017-11-04 | End: 2017-11-04

## 2017-11-04 RX ORDER — HYDROMORPHONE HYDROCHLORIDE 2 MG/ML
0.5 INJECTION INTRAMUSCULAR; INTRAVENOUS; SUBCUTANEOUS ONCE
Qty: 0 | Refills: 0 | Status: DISCONTINUED | OUTPATIENT
Start: 2017-11-04 | End: 2017-11-04

## 2017-11-04 RX ORDER — ELECTROLYTE SOLUTION,INJ
1 VIAL (ML) INTRAVENOUS
Qty: 0 | Refills: 0 | Status: DISCONTINUED | OUTPATIENT
Start: 2017-11-04 | End: 2017-11-04

## 2017-11-04 RX ORDER — DIATRIZOATE MEGLUMINE 180 MG/ML
30 INJECTION, SOLUTION INTRAVESICAL ONCE
Qty: 0 | Refills: 0 | Status: COMPLETED | OUTPATIENT
Start: 2017-11-04 | End: 2017-11-04

## 2017-11-04 RX ADMIN — Medication 650 MILLIGRAM(S): at 07:30

## 2017-11-04 RX ADMIN — Medication 650 MILLIGRAM(S): at 06:39

## 2017-11-04 RX ADMIN — Medication 50 MILLIGRAM(S): at 21:45

## 2017-11-04 RX ADMIN — OCTREOTIDE ACETATE 450 MICROGRAM(S): 200 INJECTION, SOLUTION INTRAVENOUS; SUBCUTANEOUS at 11:22

## 2017-11-04 RX ADMIN — NYSTATIN CREAM 1 APPLICATION(S): 100000 CREAM TOPICAL at 17:56

## 2017-11-04 RX ADMIN — Medication 650 MILLIGRAM(S): at 13:19

## 2017-11-04 RX ADMIN — Medication 5 MILLIGRAM(S): at 21:45

## 2017-11-04 RX ADMIN — PANTOPRAZOLE SODIUM 40 MILLIGRAM(S): 20 TABLET, DELAYED RELEASE ORAL at 06:40

## 2017-11-04 RX ADMIN — Medication 650 MILLIGRAM(S): at 14:00

## 2017-11-04 RX ADMIN — Medication 73 EACH: at 18:04

## 2017-11-04 RX ADMIN — NYSTATIN CREAM 1 APPLICATION(S): 100000 CREAM TOPICAL at 06:40

## 2017-11-04 RX ADMIN — HYDROMORPHONE HYDROCHLORIDE 0.5 MILLIGRAM(S): 2 INJECTION INTRAMUSCULAR; INTRAVENOUS; SUBCUTANEOUS at 20:55

## 2017-11-04 RX ADMIN — Medication 1 APPLICATION(S): at 06:40

## 2017-11-04 RX ADMIN — DIATRIZOATE MEGLUMINE 30 MILLILITER(S): 180 INJECTION, SOLUTION INTRAVESICAL at 12:16

## 2017-11-04 RX ADMIN — Medication 10 MILLIGRAM(S): at 06:39

## 2017-11-04 RX ADMIN — ONDANSETRON 4 MILLIGRAM(S): 8 TABLET, FILM COATED ORAL at 22:31

## 2017-11-04 RX ADMIN — HYDROMORPHONE HYDROCHLORIDE 0.5 MILLIGRAM(S): 2 INJECTION INTRAMUSCULAR; INTRAVENOUS; SUBCUTANEOUS at 20:40

## 2017-11-04 RX ADMIN — ENOXAPARIN SODIUM 30 MILLIGRAM(S): 100 INJECTION SUBCUTANEOUS at 17:54

## 2017-11-04 RX ADMIN — ONDANSETRON 4 MILLIGRAM(S): 8 TABLET, FILM COATED ORAL at 06:39

## 2017-11-04 RX ADMIN — ENOXAPARIN SODIUM 30 MILLIGRAM(S): 100 INJECTION SUBCUTANEOUS at 06:39

## 2017-11-04 RX ADMIN — ONDANSETRON 4 MILLIGRAM(S): 8 TABLET, FILM COATED ORAL at 13:19

## 2017-11-04 RX ADMIN — Medication 150 MILLIGRAM(S): at 21:48

## 2017-11-04 RX ADMIN — LOSARTAN POTASSIUM 50 MILLIGRAM(S): 100 TABLET, FILM COATED ORAL at 06:39

## 2017-11-04 RX ADMIN — Medication 100 UNIT(S): at 11:22

## 2017-11-04 RX ADMIN — Medication 150 MILLIGRAM(S): at 10:22

## 2017-11-04 RX ADMIN — I.V. FAT EMULSION 20.83 GRAM(S): 20 EMULSION INTRAVENOUS at 21:46

## 2017-11-04 RX ADMIN — Medication 10 MILLIGRAM(S): at 17:56

## 2017-11-04 RX ADMIN — ESCITALOPRAM OXALATE 20 MILLIGRAM(S): 10 TABLET, FILM COATED ORAL at 21:47

## 2017-11-04 NOTE — PROGRESS NOTE ADULT - SUBJECTIVE AND OBJECTIVE BOX
O/N: GI placed NGT in fistula, attached to wall on vac setting, wet to dry dressing replaced. BRAYDEN  11/3: Vanco trough 14.8, cont vanco dose, fistuloscope by  (pending) O/N: GI placed NGT in fistula, attached to wall on vac setting, wet to dry dressing replaced. BRAYDEN  11/3: Vanco trough 14.8, cont vanco dose, fistuloscope by  (pending)    STATUS POST:    7/24: SBR resection, fistula resection, revision of esophagogegunostomy drain through esophageal hiatus in R mediastinum  7/29: Ex-lap, SB anastamosis in BP limb breakdown with succus throughout abdomen  8/1: abd washout, drainage of abscess, biologic mesh placement, closure of abdominal wall, vac and retention suture  8/7: abd washout, mesh removal, frozen abd noted, LLQ CHECO replaced with benson drain  8/10: leak noted from previous anastamosis site on L side, mid abdomen malecot drain placed in enterotomy, JPs x 2 placed, necrotic fascia debrided, vicryl mesh sutured to fascia circumferentially, xeroform over mesh then vac dressing placed	    SUBJECTIVE: Patient seen and examined bedside by chief resident. No nausea/vomiting, no abd pain. no other complains. NGT intact. no symptoms of obstruction    enoxaparin Injectable 30 milliGRAM(s) SubCutaneous two times a day  heparin  flush 100 Units/mL Injectable 100 Unit(s) IV Push every other day  losartan 50 milliGRAM(s) Oral daily  vancomycin  IVPB 750 milliGRAM(s) IV Intermittent every 12 hours  vancomycin  IVPB          Vital Signs Last 24 Hrs  T(C): 36 (04 Nov 2017 05:19), Max: 37.4 (03 Nov 2017 13:40)  T(F): 96.8 (04 Nov 2017 05:19), Max: 99.3 (03 Nov 2017 13:40)  HR: 75 (04 Nov 2017 05:19) (74 - 80)  BP: 179/83 (04 Nov 2017 05:19) (128/78 - 179/83)  BP(mean): --  RR: 17 (04 Nov 2017 05:19) (16 - 17)  SpO2: 94% (04 Nov 2017 05:19) (94% - 97%)  I&O's Detail    03 Nov 2017 07:01  -  04 Nov 2017 07:00  --------------------------------------------------------  IN:    Solution: 150 mL    TPN (Total Parenteral Nutrition): 1462 mL  Total IN: 1612 mL    OUT:    Drain: 100 mL    Voided: 300 mL  Total OUT: 400 mL    Total NET: 1212 mL          General: NAD, resting comfortably in bed  C/V: NSR  Pulm: Nonlabored breathing, no respiratory distress  Abd: soft, NT/ND. NGT intact, no leak, dressing changed, wound [id number]. fistulax4 with output  Extrem: WWP, no edema, SCDs in place        LABS:                        10.0   6.5   )-----------( 284      ( 03 Nov 2017 07:19 )             32.6     11-03    140  |  102  |  39<H>  ----------------------------<  118<H>  4.0   |  27  |  0.52    Ca    9.9      03 Nov 2017 07:20  Phos  3.1     11-03  Mg     2.0     11-03    TPro  x   /  Alb  3.1<L>  /  TBili  x   /  DBili  x   /  AST  x   /  ALT  x   /  AlkPhos  x   11-03          RADIOLOGY & ADDITIONAL STUDIES:

## 2017-11-04 NOTE — PROGRESS NOTE ADULT - SUBJECTIVE AND OBJECTIVE BOX
Pt seen and examined at bedside. BRAYDEN overnight. Pt denies abdominal pain, nausea, vomiting, melena, CP, SOB.     Allergies    sulfa drugs (Unknown)  sulfamethoxazole (Other)    MEDICATIONS:  MEDICATIONS  (STANDING):  calcitriol Injectable 1 MICROGram(s) IV Push <User Schedule>  collagenase Ointment 1 Application(s) Topical daily  cyanocobalamin Injectable 1000 MICROGram(s) SubCutaneous every 7 days  dextrose 5%. 1000 milliLiter(s) (50 mL/Hr) IV Continuous <Continuous>  dextrose 50% Injectable 25 Gram(s) IV Push once  dextrose 50% Injectable 12.5 Gram(s) IV Push once  diphenhydrAMINE   Injectable 50 milliGRAM(s) IV Push at bedtime  enoxaparin Injectable 30 milliGRAM(s) SubCutaneous two times a day  escitalopram 20 milliGRAM(s) Oral daily  fat emulsion (Plant Based) 20% IVPB 50 Gram(s) IV Intermittent once  heparin  flush 100 Units/mL Injectable 100 Unit(s) IV Push every other day  ibuprofen  Tablet 400 milliGRAM(s) Oral once  insulin lispro (HumaLOG) corrective regimen sliding scale   SubCutaneous every 6 hours  losartan 50 milliGRAM(s) Oral daily  nystatin Powder 1 Application(s) Topical two times a day  octreotide  Injectable 450 MICROGram(s) IV Push daily  oxybutynin 10 milliGRAM(s) Oral two times a day  pantoprazole  Injectable 40 milliGRAM(s) IV Push daily  Parenteral Nutrition - Adult 1 Each (73 mL/Hr) TPN Continuous <Continuous>  Parenteral Nutrition - Adult 1 Each (73 mL/Hr) TPN Continuous <Continuous>  Parenteral Nutrition - Adult 1 Each (73 mL/Hr) TPN Continuous <Continuous>  sodium chloride 0.9% lock flush 20 milliLiter(s) IV Push once  vancomycin  IVPB 750 milliGRAM(s) IV Intermittent every 12 hours  vancomycin  IVPB        MEDICATIONS  (PRN):  acetaminophen   Tablet. 650 milliGRAM(s) Oral every 6 hours PRN Moderate Pain (4 - 6)  ondansetron Injectable 4 milliGRAM(s) IV Push every 6 hours PRN Nausea and/or Vomiting  sodium chloride 0.9% lock flush 10 milliLiter(s) IV Push every 1 hour PRN After each medication administration  sodium chloride 0.9% lock flush 10 milliLiter(s) IV Push every 12 hours PRN Lumen of catheter NOT used    Vital Signs Last 24 Hrs  T(C): 37 (04 Nov 2017 13:51), Max: 37.3 (03 Nov 2017 17:48)  T(F): 98.6 (04 Nov 2017 13:51), Max: 99.1 (03 Nov 2017 17:48)  HR: 71 (04 Nov 2017 13:51) (69 - 77)  BP: 138/81 (04 Nov 2017 13:51) (129/81 - 179/83)  BP(mean): --  RR: 17 (04 Nov 2017 13:51) (16 - 17)  SpO2: 97% (04 Nov 2017 13:51) (94% - 97%)    11-03 @ 07:01  -  11-04 @ 07:00  --------------------------------------------------------  IN: 1612 mL / OUT: 400 mL / NET: 1212 mL    11-04 @ 08:01 - 11-04 @ 16:35  --------------------------------------------------------  IN: 907 mL / OUT: 0 mL / NET: 907 mL    PHYSICAL EXAM:    General: Well developed; well nourished; in no acute distress  HEENT: MMM, conjunctiva and sclera clear  Gastrointestinal: Soft non-tender non-distended;  No rebound or guarding; Anterior abdominal wound erythematous with no increased warmth, multiple enterocutaneous fistula opening noted; drainage catheter attached to wall suction with approximately 150 cc of bilious fluid  Skin: Warm and dry.       LABS:  CBC Full  -  ( 04 Nov 2017 08:57 )  WBC Count : 6.0 K/uL  Hemoglobin : 9.8 g/dL  Hematocrit : 31.4 %  Platelet Count - Automated : 300 K/uL  Mean Cell Volume : 93.2 fL  Mean Cell Hemoglobin : 29.1 pg  Mean Cell Hemoglobin Concentration : 31.2 g/dL    11-04    141  |  101  |  40<H>  ----------------------------<  118<H>  4.2   |  30  |  0.53    Ca    9.7      04 Nov 2017 08:57  Phos  3.4     11-04  Mg     2.0     11-04    TPro  x   /  Alb  3.1<L>  /  TBili  x   /  DBili  x   /  AST  x   /  ALT  x   /  AlkPhos  x   11-03    RADIOLOGY & ADDITIONAL STUDIES:  Xray Abdomen 1 View PORTABLE -Urgent (11.04.17 @ 12:17) PRELIMINARY READ  IMPRESSION:    Two views of the abdomen are submitted following the administration of   enteric contrast via a percutaneous catheter. The first image at time 0   demonstrates enteric contrast outlining loops of bowel in the right and   left lower quadrants. Subsequent image performed approximately 5 minutes   later demonstrates advancement of contrast through multiple additional   bowel loops not seen on the initial image. This is compatible with   luminal placement of thecatheter. There is an additional   rectangular-shaped opacity on the 5 minute image immediately cephalad to   the percutaneous catheter which does not appear to conform to any   anatomic structure. Correlation with overlying radiopaque material is   recommended, otherwise extravasation cannot entirely be excluded.   Extensive postsurgical changes are seen in the left hemiabdomen including   chain suture material and surgical clips. Osseous structures demonstrate   degenerative changes.

## 2017-11-04 NOTE — PROGRESS NOTE ADULT - ASSESSMENT
57 YO F h/o HTN, gastric bypass in 2002 c/b stricture, corkscrewed sleeve and chronic leak, revised on 11/28/16 with protracted (70-day) postoperative course complicated by MDR infections, C-diff infection, respiratory compromise due to mucus plugging/PNA/pleural effusions requiring multiple interventions and a trach, as well as a continued leak requiring a draining double-pigtail with stenting performed by Dr. Sumner presents for enterocutaneous fistula resection with complicated hospital course and recurrent enterocutaneous fistula.    # Enterocutaneous fistula  - s/p enteroscopy showing multiple enterocutaneous fistulas and enter-entero fistulas  - s/p repeat enteroscopy with placement of a drainage catheter with attached bowel sponge  - suspect that the rectangular-shaped opacity cephalad to the percutaneous catheter may be the bowel sponge attached to the drainage catheter, however, agree with repeat abd XR  - Continue wall suction to assist in wound healing  - Further plan per primary team    GI will sign off at this time, please reconsult if further questions

## 2017-11-05 LAB
ANION GAP SERPL CALC-SCNC: 12 MMOL/L — SIGNIFICANT CHANGE UP (ref 5–17)
BUN SERPL-MCNC: 39 MG/DL — HIGH (ref 7–23)
CALCIUM SERPL-MCNC: 9.7 MG/DL — SIGNIFICANT CHANGE UP (ref 8.4–10.5)
CHLORIDE SERPL-SCNC: 101 MMOL/L — SIGNIFICANT CHANGE UP (ref 96–108)
CO2 SERPL-SCNC: 27 MMOL/L — SIGNIFICANT CHANGE UP (ref 22–31)
CREAT SERPL-MCNC: 0.5 MG/DL — SIGNIFICANT CHANGE UP (ref 0.5–1.3)
GLUCOSE BLDC GLUCOMTR-MCNC: 117 MG/DL — HIGH (ref 70–99)
GLUCOSE BLDC GLUCOMTR-MCNC: 122 MG/DL — HIGH (ref 70–99)
GLUCOSE BLDC GLUCOMTR-MCNC: 122 MG/DL — HIGH (ref 70–99)
GLUCOSE BLDC GLUCOMTR-MCNC: 139 MG/DL — HIGH (ref 70–99)
GLUCOSE SERPL-MCNC: 131 MG/DL — HIGH (ref 70–99)
HCT VFR BLD CALC: 31.6 % — LOW (ref 34.5–45)
HGB BLD-MCNC: 9.6 G/DL — LOW (ref 11.5–15.5)
MAGNESIUM SERPL-MCNC: 2.1 MG/DL — SIGNIFICANT CHANGE UP (ref 1.6–2.6)
MCHC RBC-ENTMCNC: 28.1 PG — SIGNIFICANT CHANGE UP (ref 27–34)
MCHC RBC-ENTMCNC: 30.4 G/DL — LOW (ref 32–36)
MCV RBC AUTO: 92.4 FL — SIGNIFICANT CHANGE UP (ref 80–100)
PHOSPHATE SERPL-MCNC: 3.4 MG/DL — SIGNIFICANT CHANGE UP (ref 2.5–4.5)
PLATELET # BLD AUTO: 303 K/UL — SIGNIFICANT CHANGE UP (ref 150–400)
POTASSIUM SERPL-MCNC: 4.2 MMOL/L — SIGNIFICANT CHANGE UP (ref 3.5–5.3)
POTASSIUM SERPL-SCNC: 4.2 MMOL/L — SIGNIFICANT CHANGE UP (ref 3.5–5.3)
RBC # BLD: 3.42 M/UL — LOW (ref 3.8–5.2)
RBC # FLD: 17 % — HIGH (ref 10.3–16.9)
SODIUM SERPL-SCNC: 140 MMOL/L — SIGNIFICANT CHANGE UP (ref 135–145)
TRIGL SERPL-MCNC: 157 MG/DL — HIGH (ref 10–149)
WBC # BLD: 6.9 K/UL — SIGNIFICANT CHANGE UP (ref 3.8–10.5)
WBC # FLD AUTO: 6.9 K/UL — SIGNIFICANT CHANGE UP (ref 3.8–10.5)

## 2017-11-05 RX ORDER — HYDROMORPHONE HYDROCHLORIDE 2 MG/ML
0.25 INJECTION INTRAMUSCULAR; INTRAVENOUS; SUBCUTANEOUS ONCE
Qty: 0 | Refills: 0 | Status: DISCONTINUED | OUTPATIENT
Start: 2017-11-05 | End: 2017-11-05

## 2017-11-05 RX ORDER — ELECTROLYTE SOLUTION,INJ
1 VIAL (ML) INTRAVENOUS
Qty: 0 | Refills: 0 | Status: DISCONTINUED | OUTPATIENT
Start: 2017-11-05 | End: 2017-11-05

## 2017-11-05 RX ADMIN — Medication 1 APPLICATION(S): at 06:15

## 2017-11-05 RX ADMIN — ONDANSETRON 4 MILLIGRAM(S): 8 TABLET, FILM COATED ORAL at 21:37

## 2017-11-05 RX ADMIN — ESCITALOPRAM OXALATE 20 MILLIGRAM(S): 10 TABLET, FILM COATED ORAL at 21:29

## 2017-11-05 RX ADMIN — ENOXAPARIN SODIUM 30 MILLIGRAM(S): 100 INJECTION SUBCUTANEOUS at 06:00

## 2017-11-05 RX ADMIN — Medication 150 MILLIGRAM(S): at 22:00

## 2017-11-05 RX ADMIN — HYDROMORPHONE HYDROCHLORIDE 0.25 MILLIGRAM(S): 2 INJECTION INTRAMUSCULAR; INTRAVENOUS; SUBCUTANEOUS at 12:01

## 2017-11-05 RX ADMIN — Medication 650 MILLIGRAM(S): at 07:14

## 2017-11-05 RX ADMIN — Medication 650 MILLIGRAM(S): at 21:37

## 2017-11-05 RX ADMIN — Medication 10 MILLIGRAM(S): at 17:54

## 2017-11-05 RX ADMIN — ONDANSETRON 4 MILLIGRAM(S): 8 TABLET, FILM COATED ORAL at 15:37

## 2017-11-05 RX ADMIN — Medication 150 MILLIGRAM(S): at 10:07

## 2017-11-05 RX ADMIN — Medication 10 MILLIGRAM(S): at 05:53

## 2017-11-05 RX ADMIN — Medication 1 EACH: at 17:54

## 2017-11-05 RX ADMIN — LOSARTAN POTASSIUM 50 MILLIGRAM(S): 100 TABLET, FILM COATED ORAL at 05:53

## 2017-11-05 RX ADMIN — HYDROMORPHONE HYDROCHLORIDE 0.25 MILLIGRAM(S): 2 INJECTION INTRAMUSCULAR; INTRAVENOUS; SUBCUTANEOUS at 21:45

## 2017-11-05 RX ADMIN — Medication 5 MILLIGRAM(S): at 21:30

## 2017-11-05 RX ADMIN — Medication 650 MILLIGRAM(S): at 07:55

## 2017-11-05 RX ADMIN — Medication 50 MILLIGRAM(S): at 21:29

## 2017-11-05 RX ADMIN — Medication 650 MILLIGRAM(S): at 22:37

## 2017-11-05 RX ADMIN — PANTOPRAZOLE SODIUM 40 MILLIGRAM(S): 20 TABLET, DELAYED RELEASE ORAL at 06:00

## 2017-11-05 RX ADMIN — HYDROMORPHONE HYDROCHLORIDE 0.25 MILLIGRAM(S): 2 INJECTION INTRAMUSCULAR; INTRAVENOUS; SUBCUTANEOUS at 12:15

## 2017-11-05 RX ADMIN — Medication 650 MILLIGRAM(S): at 01:14

## 2017-11-05 RX ADMIN — ONDANSETRON 4 MILLIGRAM(S): 8 TABLET, FILM COATED ORAL at 06:00

## 2017-11-05 RX ADMIN — HYDROMORPHONE HYDROCHLORIDE 0.25 MILLIGRAM(S): 2 INJECTION INTRAMUSCULAR; INTRAVENOUS; SUBCUTANEOUS at 21:28

## 2017-11-05 RX ADMIN — Medication 650 MILLIGRAM(S): at 02:14

## 2017-11-05 RX ADMIN — NYSTATIN CREAM 1 APPLICATION(S): 100000 CREAM TOPICAL at 06:15

## 2017-11-05 RX ADMIN — Medication 650 MILLIGRAM(S): at 15:35

## 2017-11-05 RX ADMIN — OCTREOTIDE ACETATE 450 MICROGRAM(S): 200 INJECTION, SOLUTION INTRAVENOUS; SUBCUTANEOUS at 12:02

## 2017-11-05 RX ADMIN — Medication 650 MILLIGRAM(S): at 16:30

## 2017-11-05 RX ADMIN — NYSTATIN CREAM 1 APPLICATION(S): 100000 CREAM TOPICAL at 18:01

## 2017-11-05 RX ADMIN — ENOXAPARIN SODIUM 30 MILLIGRAM(S): 100 INJECTION SUBCUTANEOUS at 17:54

## 2017-11-05 NOTE — PROGRESS NOTE ADULT - ASSESSMENT
57 F s/p  SBR, fistula resection, esophagojejunostomy revision w/ post-op course complicated by RTOR for distal anastomosis breakdown, ATN, acute respiratory failure, & septic shock, MRSA bacteremia which resolved. Currently for wound care management.     Plan:  NPO with sips and chips /TPN/IVF  Pain/nausea control - only benadryl at midnight and dilaudid 0.25mg during PT session  and wound change only  Daily wound VAC change   ISS/OOB with PT daily  Nystatin powder, vancomycin 740 Q12 (10/11-11/8)  SCDs, lovenox, OOBA  Lines :  L left subclavian line (10/4-- )  AM labs, weekly albumin, pre-albumin and triglyceride (every friday) 57 F s/p  SBR, fistula resection, esophagojejunostomy revision w/ post-op course complicated by RTOR for distal anastomosis breakdown, ATN, acute respiratory failure, & septic shock, MRSA bacteremia which resolved. Currently for wound care management.     Plan:  NPO with sips and chips /TPN/IVF  Pain/nausea control - only benadryl at midnight and dilaudid 0.25mg during PT session  and wound change only  Daily wound VAC change   ISS/OOB with PT daily  Nystatin powder, vancomycin 740 Q12 (10/11-11/8)  SCDs, lovenox, OOBA  Lines :  L left subclavian line (10/4-- )  AM labs, weekly albumin, pre-albumin and triglyceride (every friday)  dressing change  hold TF

## 2017-11-05 NOTE — PROGRESS NOTE ADULT - SUBJECTIVE AND OBJECTIVE BOX
ON : Wound care changed, VAC+Ostomy. BRAYDEN  11/4 : Tube study done, showed contrast passing through large bowel, TF started @5cc/hr. Vanco trough 17.8, resume current dose.    Surgeries:  7/24: SBR resection, fistula resection, revision of esophagogegunostomy drain through esophageal hiatus in R mediastinum  7/29: Ex-lap, SB anastamosis in BP limb breakdown with succus throughout abdomen  8/1: abd washout, drainage of abscess, biologic mesh placement, closure of abdominal wall, vac and retention suture  8/7: abd washout, mesh removal, frozen abd noted, LLQ CHECO replaced with benson drain  8/10: leak noted from previous anastamosis site on L side, mid abdomen malecot drain placed in enterotomy, JPs x 2 placed, necrotic fascia debrided, vicryl mesh sutured to fascia circumferentially, xeroform over mesh then vac dressing placed ON : Wound care changed, VAC+Ostomy. BRAYDEN  11/4 : Tube study done, showed contrast passing through large bowel, TF started @5cc/hr. Vanco trough 17.8, resume current dose.    Surgeries:  7/24: SBR resection, fistula resection, revision of esophagogegunostomy drain through esophageal hiatus in R mediastinum  7/29: Ex-lap, SB anastamosis in BP limb breakdown with succus throughout abdomen  8/1: abd washout, drainage of abscess, biologic mesh placement, closure of abdominal wall, vac and retention suture  8/7: abd washout, mesh removal, frozen abd noted, LLQ CHECO replaced with benson drain  8/10: leak noted from previous anastamosis site on L side, mid abdomen malecot drain placed in enterotomy, JPs x 2 placed, necrotic fascia debrided, vicryl mesh sutured to fascia circumferentially, xeroform over mesh then vac dressing placed	        SUBJECTIVE: pt denies abd pain, N/V. katya TF    MEDICATIONS  (STANDING):  calcitriol Injectable 1 MICROGram(s) IV Push <User Schedule>  collagenase Ointment 1 Application(s) Topical daily  cyanocobalamin Injectable 1000 MICROGram(s) SubCutaneous every 7 days  dextrose 5%. 1000 milliLiter(s) (50 mL/Hr) IV Continuous <Continuous>  dextrose 50% Injectable 25 Gram(s) IV Push once  dextrose 50% Injectable 12.5 Gram(s) IV Push once  diphenhydrAMINE   Injectable 50 milliGRAM(s) IV Push at bedtime  enoxaparin Injectable 30 milliGRAM(s) SubCutaneous two times a day  escitalopram 20 milliGRAM(s) Oral daily  heparin  flush 100 Units/mL Injectable 100 Unit(s) IV Push every other day  ibuprofen  Tablet 400 milliGRAM(s) Oral once  insulin lispro (HumaLOG) corrective regimen sliding scale   SubCutaneous every 6 hours  losartan 50 milliGRAM(s) Oral daily  nystatin Powder 1 Application(s) Topical two times a day  octreotide  Injectable 450 MICROGram(s) IV Push daily  oxybutynin 10 milliGRAM(s) Oral two times a day  pantoprazole  Injectable 40 milliGRAM(s) IV Push daily  Parenteral Nutrition - Adult 1 Each (73 mL/Hr) TPN Continuous <Continuous>  Parenteral Nutrition - Adult 1 Each (73 mL/Hr) TPN Continuous <Continuous>  Parenteral Nutrition - Adult 1 Each (73 mL/Hr) TPN Continuous <Continuous>  sodium chloride 0.9% lock flush 20 milliLiter(s) IV Push once  vancomycin  IVPB 750 milliGRAM(s) IV Intermittent every 12 hours  vancomycin  IVPB        MEDICATIONS  (PRN):  acetaminophen   Tablet. 650 milliGRAM(s) Oral every 6 hours PRN Moderate Pain (4 - 6)  ondansetron Injectable 4 milliGRAM(s) IV Push every 6 hours PRN Nausea and/or Vomiting  sodium chloride 0.9% lock flush 10 milliLiter(s) IV Push every 1 hour PRN After each medication administration  sodium chloride 0.9% lock flush 10 milliLiter(s) IV Push every 12 hours PRN Lumen of catheter NOT used      Vital Signs Last 24 Hrs  T(C): 36.7 (05 Nov 2017 05:47), Max: 37 (04 Nov 2017 13:51)  T(F): 98 (05 Nov 2017 05:47), Max: 98.6 (04 Nov 2017 13:51)  HR: 74 (05 Nov 2017 05:47) (69 - 78)  BP: 140/89 (05 Nov 2017 05:47) (138/81 - 159/107)  BP(mean): --  RR: 16 (05 Nov 2017 05:47) (16 - 17)  SpO2: 97% (05 Nov 2017 05:47) (95% - 97%)    PHYSICAL EXAM:      Constitutional: A&Ox3      Respiratory: non labored breathing, no respiratory distress    Cardiovascular: NSR, RRR    Gastrointestinal: soft, NT/ND. NGT intact,  fistula x4 with output-no leak                     Genitourinary: voiding    Extremities: (-) edema                  I&O's Detail    04 Nov 2017 08:01  -  05 Nov 2017 07:00  --------------------------------------------------------  IN:    Fat Emulsion 20%: 208 mL    Jevity: 60 mL    Solution: 400 mL    TPN (Total Parenteral Nutrition): 1752 mL  Total IN: 2420 mL    OUT:  Total OUT: 0 mL    Total NET: 2420 mL          LABS:                        9.8    6.0   )-----------( 300      ( 04 Nov 2017 08:57 )             31.4     11-04    141  |  101  |  40<H>  ----------------------------<  118<H>  4.2   |  30  |  0.53    Ca    9.7      04 Nov 2017 08:57  Phos  3.4     11-04  Mg     2.0     11-04    TPro  x   /  Alb  3.1<L>  /  TBili  x   /  DBili  x   /  AST  x   /  ALT  x   /  AlkPhos  x   11-03          RADIOLOGY & ADDITIONAL STUDIES:

## 2017-11-06 LAB
ANION GAP SERPL CALC-SCNC: 9 MMOL/L — SIGNIFICANT CHANGE UP (ref 5–17)
BUN SERPL-MCNC: 36 MG/DL — HIGH (ref 7–23)
CALCIUM SERPL-MCNC: 9.4 MG/DL — SIGNIFICANT CHANGE UP (ref 8.4–10.5)
CHLORIDE SERPL-SCNC: 101 MMOL/L — SIGNIFICANT CHANGE UP (ref 96–108)
CO2 SERPL-SCNC: 27 MMOL/L — SIGNIFICANT CHANGE UP (ref 22–31)
CREAT SERPL-MCNC: 0.53 MG/DL — SIGNIFICANT CHANGE UP (ref 0.5–1.3)
GLUCOSE BLDC GLUCOMTR-MCNC: 114 MG/DL — HIGH (ref 70–99)
GLUCOSE BLDC GLUCOMTR-MCNC: 116 MG/DL — HIGH (ref 70–99)
GLUCOSE BLDC GLUCOMTR-MCNC: 119 MG/DL — HIGH (ref 70–99)
GLUCOSE BLDC GLUCOMTR-MCNC: 129 MG/DL — HIGH (ref 70–99)
GLUCOSE BLDC GLUCOMTR-MCNC: 129 MG/DL — HIGH (ref 70–99)
GLUCOSE SERPL-MCNC: 137 MG/DL — HIGH (ref 70–99)
HCT VFR BLD CALC: 32.2 % — LOW (ref 34.5–45)
HGB BLD-MCNC: 9.7 G/DL — LOW (ref 11.5–15.5)
MAGNESIUM SERPL-MCNC: 2.2 MG/DL — SIGNIFICANT CHANGE UP (ref 1.6–2.6)
MCHC RBC-ENTMCNC: 27.8 PG — SIGNIFICANT CHANGE UP (ref 27–34)
MCHC RBC-ENTMCNC: 30.1 G/DL — LOW (ref 32–36)
MCV RBC AUTO: 92.3 FL — SIGNIFICANT CHANGE UP (ref 80–100)
PHOSPHATE SERPL-MCNC: 3.5 MG/DL — SIGNIFICANT CHANGE UP (ref 2.5–4.5)
PLATELET # BLD AUTO: 279 K/UL — SIGNIFICANT CHANGE UP (ref 150–400)
POTASSIUM SERPL-MCNC: 4.3 MMOL/L — SIGNIFICANT CHANGE UP (ref 3.5–5.3)
POTASSIUM SERPL-SCNC: 4.3 MMOL/L — SIGNIFICANT CHANGE UP (ref 3.5–5.3)
RBC # BLD: 3.49 M/UL — LOW (ref 3.8–5.2)
RBC # FLD: 17 % — HIGH (ref 10.3–16.9)
SODIUM SERPL-SCNC: 137 MMOL/L — SIGNIFICANT CHANGE UP (ref 135–145)
VANCOMYCIN TROUGH SERPL-MCNC: 16.3 UG/ML — SIGNIFICANT CHANGE UP (ref 10–20)
WBC # BLD: 5.7 K/UL — SIGNIFICANT CHANGE UP (ref 3.8–10.5)
WBC # FLD AUTO: 5.7 K/UL — SIGNIFICANT CHANGE UP (ref 3.8–10.5)

## 2017-11-06 RX ORDER — HYDROMORPHONE HYDROCHLORIDE 2 MG/ML
0.25 INJECTION INTRAMUSCULAR; INTRAVENOUS; SUBCUTANEOUS DAILY
Qty: 0 | Refills: 0 | Status: DISCONTINUED | OUTPATIENT
Start: 2017-11-06 | End: 2017-11-06

## 2017-11-06 RX ORDER — I.V. FAT EMULSION 20 G/100ML
50 EMULSION INTRAVENOUS ONCE
Qty: 0 | Refills: 0 | Status: COMPLETED | OUTPATIENT
Start: 2017-11-06 | End: 2017-11-06

## 2017-11-06 RX ORDER — HYDROMORPHONE HYDROCHLORIDE 2 MG/ML
0.25 INJECTION INTRAMUSCULAR; INTRAVENOUS; SUBCUTANEOUS ONCE
Qty: 0 | Refills: 0 | Status: DISCONTINUED | OUTPATIENT
Start: 2017-11-06 | End: 2017-11-06

## 2017-11-06 RX ORDER — ELECTROLYTE SOLUTION,INJ
1 VIAL (ML) INTRAVENOUS
Qty: 0 | Refills: 0 | Status: DISCONTINUED | OUTPATIENT
Start: 2017-11-06 | End: 2017-11-06

## 2017-11-06 RX ADMIN — Medication 5 MILLIGRAM(S): at 21:43

## 2017-11-06 RX ADMIN — HYDROMORPHONE HYDROCHLORIDE 0.25 MILLIGRAM(S): 2 INJECTION INTRAMUSCULAR; INTRAVENOUS; SUBCUTANEOUS at 18:40

## 2017-11-06 RX ADMIN — Medication 650 MILLIGRAM(S): at 11:22

## 2017-11-06 RX ADMIN — Medication 1 APPLICATION(S): at 05:48

## 2017-11-06 RX ADMIN — NYSTATIN CREAM 1 APPLICATION(S): 100000 CREAM TOPICAL at 17:50

## 2017-11-06 RX ADMIN — ENOXAPARIN SODIUM 30 MILLIGRAM(S): 100 INJECTION SUBCUTANEOUS at 17:49

## 2017-11-06 RX ADMIN — SODIUM CHLORIDE 10 MILLILITER(S): 9 INJECTION INTRAMUSCULAR; INTRAVENOUS; SUBCUTANEOUS at 11:51

## 2017-11-06 RX ADMIN — PANTOPRAZOLE SODIUM 40 MILLIGRAM(S): 20 TABLET, DELAYED RELEASE ORAL at 05:48

## 2017-11-06 RX ADMIN — ONDANSETRON 4 MILLIGRAM(S): 8 TABLET, FILM COATED ORAL at 17:41

## 2017-11-06 RX ADMIN — Medication 50 MILLIGRAM(S): at 21:42

## 2017-11-06 RX ADMIN — Medication 1 EACH: at 17:41

## 2017-11-06 RX ADMIN — Medication 650 MILLIGRAM(S): at 05:00

## 2017-11-06 RX ADMIN — ONDANSETRON 4 MILLIGRAM(S): 8 TABLET, FILM COATED ORAL at 04:04

## 2017-11-06 RX ADMIN — Medication 650 MILLIGRAM(S): at 10:01

## 2017-11-06 RX ADMIN — Medication 150 MILLIGRAM(S): at 10:01

## 2017-11-06 RX ADMIN — Medication 650 MILLIGRAM(S): at 17:41

## 2017-11-06 RX ADMIN — ONDANSETRON 4 MILLIGRAM(S): 8 TABLET, FILM COATED ORAL at 10:01

## 2017-11-06 RX ADMIN — ENOXAPARIN SODIUM 30 MILLIGRAM(S): 100 INJECTION SUBCUTANEOUS at 05:48

## 2017-11-06 RX ADMIN — SODIUM CHLORIDE 10 MILLILITER(S): 9 INJECTION INTRAMUSCULAR; INTRAVENOUS; SUBCUTANEOUS at 17:56

## 2017-11-06 RX ADMIN — Medication 650 MILLIGRAM(S): at 04:04

## 2017-11-06 RX ADMIN — Medication 150 MILLIGRAM(S): at 23:19

## 2017-11-06 RX ADMIN — ESCITALOPRAM OXALATE 20 MILLIGRAM(S): 10 TABLET, FILM COATED ORAL at 21:43

## 2017-11-06 RX ADMIN — HYDROMORPHONE HYDROCHLORIDE 0.25 MILLIGRAM(S): 2 INJECTION INTRAMUSCULAR; INTRAVENOUS; SUBCUTANEOUS at 17:56

## 2017-11-06 RX ADMIN — LOSARTAN POTASSIUM 50 MILLIGRAM(S): 100 TABLET, FILM COATED ORAL at 05:48

## 2017-11-06 RX ADMIN — CALCITRIOL 1 MICROGRAM(S): 0.5 CAPSULE ORAL at 11:49

## 2017-11-06 RX ADMIN — OCTREOTIDE ACETATE 450 MICROGRAM(S): 200 INJECTION, SOLUTION INTRAVENOUS; SUBCUTANEOUS at 11:51

## 2017-11-06 RX ADMIN — NYSTATIN CREAM 1 APPLICATION(S): 100000 CREAM TOPICAL at 05:48

## 2017-11-06 RX ADMIN — Medication 650 MILLIGRAM(S): at 18:40

## 2017-11-06 RX ADMIN — Medication 10 MILLIGRAM(S): at 17:42

## 2017-11-06 RX ADMIN — Medication 100 UNIT(S): at 11:49

## 2017-11-06 RX ADMIN — Medication 10 MILLIGRAM(S): at 05:48

## 2017-11-06 RX ADMIN — I.V. FAT EMULSION 20.83 GRAM(S): 20 EMULSION INTRAVENOUS at 21:49

## 2017-11-06 NOTE — PROGRESS NOTE ADULT - SUBJECTIVE AND OBJECTIVE BOX
patient is stable and gi has placed tube that has bile draining  leaking around  will try to place second tube with black sponge and use as endoluminal vac  to control  Nutrition has improved with pre albumin of 31 and albumin of 3.1   thus tpn is adequate  feel have reached time period to proceed with surgery if these steps do not allow us to feed into gi tract

## 2017-11-06 NOTE — PROGRESS NOTE ADULT - ASSESSMENT
57 F s/p  SBR, fistula resection, esophagojejunostomy revision w/ post-op course complicated by RTOR for distal anastomosis breakdown, ATN, acute respiratory failure, & septic shock, MRSA bacteremia which resolved. Currently for wound care management.     Plan:  NPO with sips and chips /TPN/IVF with TF @5cc/hr  Pain/nausea control - only benadryl at midnight and dilaudid 0.25mg during PT session  and wound change only  Daily wound VAC change   ISS/OOB with PT daily  Nystatin powder, vancomycin 750 Q12 (10/11-11/8)  SCDs, lovenox, OOBA  Lines :  L left subclavian line (10/4-- )  AM labs, weekly albumin, pre-albumin and triglyceride (every friday) 57 F s/p  SBR, fistula resection, esophagojejunostomy revision w/ post-op course complicated by RTOR for distal anastomosis breakdown, ATN, acute respiratory failure, & septic shock, MRSA bacteremia which resolved. Currently for wound care management.     Plan:  NPO with sips and chips /TPN/IVF  Pain/nausea control - only benadryl at midnight and dilaudid 0.25mg during PT session  and wound change only  Daily wound VAC change   ISS/OOB with PT daily  Nystatin powder, vancomycin 750 Q12 (10/11-11/8) - next vanco trough today at 9pm  SCDs, lovenox, OOBA  Lines :  L left subclavian line (10/4-- )  AM labs, weekly albumin, pre-albumin and triglyceride (every friday)

## 2017-11-06 NOTE — PROGRESS NOTE ADULT - SUBJECTIVE AND OBJECTIVE BOX
On interval followup, the left subclavian venous catheter site is clean, dry and  non-inflamed and non-tender. Afebrile. Notes, cultures and progress are followed.

## 2017-11-06 NOTE — PROGRESS NOTE ADULT - SUBJECTIVE AND OBJECTIVE BOX
O/N: wound dressing reinforced. BRAYDEN. VSS.  11/5: wound dressing changed. NGT to suction. d/c'd TF O/N: wound dressing reinforced. BRAYDEN. VSS.  11/5: wound dressing changed. NGT to suction. d/c'd TF    STATUS POST:     7/24: SBR resection, fistula resection, revision of esophagogegunostomy drain through esophageal hiatus in R mediastinum  7/29: Ex-lap, SB anastamosis in BP limb breakdown with succus throughout abdomen  8/1: abd washout, drainage of abscess, biologic mesh placement, closure of abdominal wall, vac and retention suture  8/7: abd washout, mesh removal, frozen abd noted, LLQ CHECO replaced with benson drain  8/10: leak noted from previous anastamosis site on L side, mid abdomen malecot drain placed in enterotomy, JPs x 2 placed, necrotic fascia debrided, vicryl mesh sutured to fascia circumferentially, xeroform over mesh then vac dressing placed  SUBJECTIVE: Patient seen and examined bedside by chief resident.    enoxaparin Injectable 30 milliGRAM(s) SubCutaneous two times a day  heparin  flush 100 Units/mL Injectable 100 Unit(s) IV Push every other day  losartan 50 milliGRAM(s) Oral daily  vancomycin  IVPB 750 milliGRAM(s) IV Intermittent every 12 hours  vancomycin  IVPB          Vital Signs Last 24 Hrs  T(C): 37.1 (05 Nov 2017 20:35), Max: 37.1 (05 Nov 2017 20:35)  T(F): 98.7 (05 Nov 2017 20:35), Max: 98.7 (05 Nov 2017 20:35)  HR: 84 (05 Nov 2017 20:35) (70 - 84)  BP: 144/76 (05 Nov 2017 20:35) (120/79 - 144/76)  BP(mean): --  RR: 16 (05 Nov 2017 20:35) (16 - 18)  SpO2: 95% (05 Nov 2017 20:35) (92% - 95%)  I&O's Detail    04 Nov 2017 08:01  -  05 Nov 2017 07:00  --------------------------------------------------------  IN:    Fat Emulsion 20%: 208 mL    Jevity: 60 mL    Solution: 400 mL    TPN (Total Parenteral Nutrition): 1752 mL  Total IN: 2420 mL    OUT:  Total OUT: 0 mL    Total NET: 2420 mL      05 Nov 2017 07:01  -  06 Nov 2017 06:13  --------------------------------------------------------  IN:    Solution: 400 mL    TPN (Total Parenteral Nutrition): 1314 mL  Total IN: 1714 mL    OUT:    Drain: 125 mL  Total OUT: 125 mL    Total NET: 1589 mL          General: NAD, resting comfortably in bed  C/V: NSR  Pulm: Nonlabored breathing, no respiratory distress  Abd: soft, NT/ND. Wound with fistula manager, NGT to suction,  wound size : [id number], 4 fistulas  Extrem: WWP, no edema, SCDs in place        LABS:                        9.6    6.9   )-----------( 303      ( 05 Nov 2017 07:40 )             31.6     11-05    140  |  101  |  39<H>  ----------------------------<  131<H>  4.2   |  27  |  0.50    Ca    9.7      05 Nov 2017 07:40  Phos  3.4     11-05  Mg     2.1     11-05            RADIOLOGY & ADDITIONAL STUDIES: O/N: wound dressing reinforced. BRAYDEN. VSS.  11/5: wound dressing changed. NGT to suction. d/c'd TF    STATUS POST:     7/24: SBR resection, fistula resection, revision of esophagogegunostomy drain through esophageal hiatus in R mediastinum  7/29: Ex-lap, SB anastamosis in BP limb breakdown with succus throughout abdomen  8/1: abd washout, drainage of abscess, biologic mesh placement, closure of abdominal wall, vac and retention suture  8/7: abd washout, mesh removal, frozen abd noted, LLQ CHECO replaced with benson drain  8/10: leak noted from previous anastamosis site on L side, mid abdomen malecot drain placed in enterotomy, JPs x 2 placed, necrotic fascia debrided, vicryl mesh sutured to fascia circumferentially, xeroform over mesh then vac dressing placed    SUBJECTIVE: Patient seen and examined bedside by chief resident. No nausea/vomiting, no other complains    enoxaparin Injectable 30 milliGRAM(s) SubCutaneous two times a day  heparin  flush 100 Units/mL Injectable 100 Unit(s) IV Push every other day  losartan 50 milliGRAM(s) Oral daily  vancomycin  IVPB 750 milliGRAM(s) IV Intermittent every 12 hours  vancomycin  IVPB          Vital Signs Last 24 Hrs  T(C): 37.1 (05 Nov 2017 20:35), Max: 37.1 (05 Nov 2017 20:35)  T(F): 98.7 (05 Nov 2017 20:35), Max: 98.7 (05 Nov 2017 20:35)  HR: 84 (05 Nov 2017 20:35) (70 - 84)  BP: 144/76 (05 Nov 2017 20:35) (120/79 - 144/76)  BP(mean): --  RR: 16 (05 Nov 2017 20:35) (16 - 18)  SpO2: 95% (05 Nov 2017 20:35) (92% - 95%)  I&O's Detail    04 Nov 2017 08:01  -  05 Nov 2017 07:00  --------------------------------------------------------  IN:    Fat Emulsion 20%: 208 mL    Jevity: 60 mL    Solution: 400 mL    TPN (Total Parenteral Nutrition): 1752 mL  Total IN: 2420 mL    OUT:  Total OUT: 0 mL    Total NET: 2420 mL      05 Nov 2017 07:01  -  06 Nov 2017 06:13  --------------------------------------------------------  IN:    Solution: 400 mL    TPN (Total Parenteral Nutrition): 1314 mL  Total IN: 1714 mL    OUT:    Drain: 125 mL  Total OUT: 125 mL    Total NET: 1589 mL          General: NAD, resting comfortably in bed  C/V: NSR  Pulm: Nonlabored breathing, no respiratory distress  Abd: soft, NT/ND. Wound with fistula manager, NGT to suction,  wound size : [id number], 4 fistulas  Extrem: WWP, no edema, SCDs in place        LABS:                        9.6    6.9   )-----------( 303      ( 05 Nov 2017 07:40 )             31.6     11-05    140  |  101  |  39<H>  ----------------------------<  131<H>  4.2   |  27  |  0.50    Ca    9.7      05 Nov 2017 07:40  Phos  3.4     11-05  Mg     2.1     11-05            RADIOLOGY & ADDITIONAL STUDIES:

## 2017-11-07 LAB
ANION GAP SERPL CALC-SCNC: 10 MMOL/L — SIGNIFICANT CHANGE UP (ref 5–17)
BUN SERPL-MCNC: 34 MG/DL — HIGH (ref 7–23)
CALCIUM SERPL-MCNC: 9.9 MG/DL — SIGNIFICANT CHANGE UP (ref 8.4–10.5)
CHLORIDE SERPL-SCNC: 102 MMOL/L — SIGNIFICANT CHANGE UP (ref 96–108)
CO2 SERPL-SCNC: 27 MMOL/L — SIGNIFICANT CHANGE UP (ref 22–31)
CREAT SERPL-MCNC: 0.49 MG/DL — LOW (ref 0.5–1.3)
GLUCOSE BLDC GLUCOMTR-MCNC: 110 MG/DL — HIGH (ref 70–99)
GLUCOSE BLDC GLUCOMTR-MCNC: 113 MG/DL — HIGH (ref 70–99)
GLUCOSE BLDC GLUCOMTR-MCNC: 126 MG/DL — HIGH (ref 70–99)
GLUCOSE SERPL-MCNC: 94 MG/DL — SIGNIFICANT CHANGE UP (ref 70–99)
HCT VFR BLD CALC: 34.5 % — SIGNIFICANT CHANGE UP (ref 34.5–45)
HGB BLD-MCNC: 10.3 G/DL — LOW (ref 11.5–15.5)
MAGNESIUM SERPL-MCNC: 2.1 MG/DL — SIGNIFICANT CHANGE UP (ref 1.6–2.6)
MCHC RBC-ENTMCNC: 28 PG — SIGNIFICANT CHANGE UP (ref 27–34)
MCHC RBC-ENTMCNC: 29.9 G/DL — LOW (ref 32–36)
MCV RBC AUTO: 93.8 FL — SIGNIFICANT CHANGE UP (ref 80–100)
PHOSPHATE SERPL-MCNC: 3.5 MG/DL — SIGNIFICANT CHANGE UP (ref 2.5–4.5)
PLATELET # BLD AUTO: 302 K/UL — SIGNIFICANT CHANGE UP (ref 150–400)
POTASSIUM SERPL-MCNC: 4.5 MMOL/L — SIGNIFICANT CHANGE UP (ref 3.5–5.3)
POTASSIUM SERPL-SCNC: 4.5 MMOL/L — SIGNIFICANT CHANGE UP (ref 3.5–5.3)
RBC # BLD: 3.68 M/UL — LOW (ref 3.8–5.2)
RBC # FLD: 16.7 % — SIGNIFICANT CHANGE UP (ref 10.3–16.9)
SODIUM SERPL-SCNC: 139 MMOL/L — SIGNIFICANT CHANGE UP (ref 135–145)
WBC # BLD: 6.1 K/UL — SIGNIFICANT CHANGE UP (ref 3.8–10.5)
WBC # FLD AUTO: 6.1 K/UL — SIGNIFICANT CHANGE UP (ref 3.8–10.5)

## 2017-11-07 RX ORDER — HYDROMORPHONE HYDROCHLORIDE 2 MG/ML
0.5 INJECTION INTRAMUSCULAR; INTRAVENOUS; SUBCUTANEOUS ONCE
Qty: 0 | Refills: 0 | Status: DISCONTINUED | OUTPATIENT
Start: 2017-11-07 | End: 2017-11-07

## 2017-11-07 RX ORDER — ELECTROLYTE SOLUTION,INJ
1 VIAL (ML) INTRAVENOUS
Qty: 0 | Refills: 0 | Status: DISCONTINUED | OUTPATIENT
Start: 2017-11-07 | End: 2017-11-07

## 2017-11-07 RX ADMIN — ONDANSETRON 4 MILLIGRAM(S): 8 TABLET, FILM COATED ORAL at 18:43

## 2017-11-07 RX ADMIN — Medication 10 MILLIGRAM(S): at 17:44

## 2017-11-07 RX ADMIN — ONDANSETRON 4 MILLIGRAM(S): 8 TABLET, FILM COATED ORAL at 12:37

## 2017-11-07 RX ADMIN — HYDROMORPHONE HYDROCHLORIDE 0.5 MILLIGRAM(S): 2 INJECTION INTRAMUSCULAR; INTRAVENOUS; SUBCUTANEOUS at 12:38

## 2017-11-07 RX ADMIN — Medication 650 MILLIGRAM(S): at 13:30

## 2017-11-07 RX ADMIN — Medication 650 MILLIGRAM(S): at 03:27

## 2017-11-07 RX ADMIN — Medication 1 EACH: at 17:45

## 2017-11-07 RX ADMIN — HYDROMORPHONE HYDROCHLORIDE 0.5 MILLIGRAM(S): 2 INJECTION INTRAMUSCULAR; INTRAVENOUS; SUBCUTANEOUS at 12:48

## 2017-11-07 RX ADMIN — Medication 650 MILLIGRAM(S): at 12:38

## 2017-11-07 RX ADMIN — Medication 50 MILLIGRAM(S): at 22:17

## 2017-11-07 RX ADMIN — PANTOPRAZOLE SODIUM 40 MILLIGRAM(S): 20 TABLET, DELAYED RELEASE ORAL at 06:56

## 2017-11-07 RX ADMIN — Medication 5 MILLIGRAM(S): at 22:17

## 2017-11-07 RX ADMIN — Medication 1 APPLICATION(S): at 06:58

## 2017-11-07 RX ADMIN — OCTREOTIDE ACETATE 450 MICROGRAM(S): 200 INJECTION, SOLUTION INTRAVENOUS; SUBCUTANEOUS at 12:38

## 2017-11-07 RX ADMIN — ONDANSETRON 4 MILLIGRAM(S): 8 TABLET, FILM COATED ORAL at 03:29

## 2017-11-07 RX ADMIN — Medication 150 MILLIGRAM(S): at 10:08

## 2017-11-07 RX ADMIN — Medication 10 MILLIGRAM(S): at 06:57

## 2017-11-07 RX ADMIN — NYSTATIN CREAM 1 APPLICATION(S): 100000 CREAM TOPICAL at 06:58

## 2017-11-07 RX ADMIN — ESCITALOPRAM OXALATE 20 MILLIGRAM(S): 10 TABLET, FILM COATED ORAL at 22:17

## 2017-11-07 RX ADMIN — NYSTATIN CREAM 1 APPLICATION(S): 100000 CREAM TOPICAL at 17:45

## 2017-11-07 RX ADMIN — Medication 650 MILLIGRAM(S): at 18:43

## 2017-11-07 RX ADMIN — ENOXAPARIN SODIUM 30 MILLIGRAM(S): 100 INJECTION SUBCUTANEOUS at 17:45

## 2017-11-07 RX ADMIN — Medication 150 MILLIGRAM(S): at 22:17

## 2017-11-07 RX ADMIN — ENOXAPARIN SODIUM 30 MILLIGRAM(S): 100 INJECTION SUBCUTANEOUS at 06:56

## 2017-11-07 RX ADMIN — LOSARTAN POTASSIUM 50 MILLIGRAM(S): 100 TABLET, FILM COATED ORAL at 06:57

## 2017-11-07 NOTE — PROGRESS NOTE ADULT - SUBJECTIVE AND OBJECTIVE BOX
O/N vanco trough 16.3 resume current dose. BRAYDEN. VSS  11/6 : 2nd NGT to suction done, started TF 10cc/hr, wound dressing wet to dry. BRAYDEN, TPN ordered O/N vanco trough 16.3 resume current dose. BRAYDEN. VSS  11/6 : 2nd NGT to suction done, started TF 10cc/hr, wound dressing wet to dry. BRAYDEN, TPN ordered  OVERNIGHT EVENTS:    STATUS POST:      POST OPERATIVE DAY #:     SUBJECTIVE:  Flatus: [] YES [] NO             Bowel Movement: [ ] YES [ ] NO  Pain (0-10):            Pain Control Adequate: [ ] YES [ ] NO  Nausea: [ ] YES [ ] NO            Vomiting: [ ] YES [ ] NO  Diarrhea: [ ] YES [ ] NO         Constipation: [ ] YES [ ] NO     Chest Pain: [ ] YES [ ] NO    SOB:  [ ] YES [ ] NO    enoxaparin Injectable 30 milliGRAM(s) SubCutaneous two times a day  heparin  flush 100 Units/mL Injectable 100 Unit(s) IV Push every other day  losartan 50 milliGRAM(s) Oral daily  vancomycin  IVPB 750 milliGRAM(s) IV Intermittent every 12 hours  vancomycin  IVPB          Vital Signs Last 24 Hrs  T(C): 36.7 (07 Nov 2017 05:17), Max: 37.1 (06 Nov 2017 08:54)  T(F): 98.1 (07 Nov 2017 05:17), Max: 98.8 (06 Nov 2017 08:54)  HR: 74 (07 Nov 2017 05:17) (70 - 85)  BP: 146/83 (07 Nov 2017 05:17) (132/76 - 150/75)  BP(mean): --  RR: 17 (07 Nov 2017 05:17) (16 - 17)  SpO2: 94% (07 Nov 2017 05:17) (92% - 95%)  I&O's Detail    06 Nov 2017 07:01  -  07 Nov 2017 07:00  --------------------------------------------------------  IN:    Fat Emulsion 20%: 208 mL    Jevity: 142 mL    Solution: 150 mL    TPN (Total Parenteral Nutrition): 1460 mL  Total IN: 1960 mL    OUT:    Drain: 100 mL    Voided: 900 mL  Total OUT: 1000 mL    Total NET: 960 mL          General: NAD, resting comfortably in bed  C/V: NSR  Pulm: Nonlabored breathing, no respiratory distress  Abd: soft,  wound is 10 x 13 clean and dry . Granulating present  Extrem: WWP, no edema, SCDs in place  Drains: Bilious       LABS:                        9.7    5.7   )-----------( 279      ( 06 Nov 2017 06:01 )             32.2     11-06    137  |  101  |  36<H>  ----------------------------<  137<H>  4.3   |  27  |  0.53    Ca    9.4      06 Nov 2017 06:00  Phos  3.5     11-06  Mg     2.2     11-06

## 2017-11-07 NOTE — PROGRESS NOTE ADULT - ASSESSMENT
57 F s/p  SBR, fistula resection, esophagojejunostomy revision w/ post-op course complicated by RTOR for distal anastomosis breakdown, ATN, acute respiratory failure, & septic shock, MRSA bacteremia which resolved. Currently for wound care management.     Plan:  NPO with sips and chips /TPN/IVF with TF @5cc/hr  Pain/nausea control - only benadryl at midnight and dilaudid 0.25mg during PT session  and wound change only  Daily wound VAC change   ISS/OOB with PT daily  Nystatin powder, vancomycin 750 Q12 (10/11-11/8)  SCDs, lovenox, OOBA  Lines :  L left subclavian line (10/4-- )  AM labs, weekly albumin, pre-albumin and triglyceride (every friday)

## 2017-11-08 LAB
ANION GAP SERPL CALC-SCNC: 11 MMOL/L — SIGNIFICANT CHANGE UP (ref 5–17)
BUN SERPL-MCNC: 34 MG/DL — HIGH (ref 7–23)
CALCIUM SERPL-MCNC: 10.2 MG/DL — SIGNIFICANT CHANGE UP (ref 8.4–10.5)
CHLORIDE SERPL-SCNC: 102 MMOL/L — SIGNIFICANT CHANGE UP (ref 96–108)
CO2 SERPL-SCNC: 28 MMOL/L — SIGNIFICANT CHANGE UP (ref 22–31)
CREAT SERPL-MCNC: 0.58 MG/DL — SIGNIFICANT CHANGE UP (ref 0.5–1.3)
GLUCOSE BLDC GLUCOMTR-MCNC: 112 MG/DL — HIGH (ref 70–99)
GLUCOSE BLDC GLUCOMTR-MCNC: 127 MG/DL — HIGH (ref 70–99)
GLUCOSE BLDC GLUCOMTR-MCNC: 134 MG/DL — HIGH (ref 70–99)
GLUCOSE BLDC GLUCOMTR-MCNC: 143 MG/DL — HIGH (ref 70–99)
GLUCOSE BLDC GLUCOMTR-MCNC: 149 MG/DL — HIGH (ref 70–99)
GLUCOSE SERPL-MCNC: 114 MG/DL — HIGH (ref 70–99)
HCT VFR BLD CALC: 35.6 % — SIGNIFICANT CHANGE UP (ref 34.5–45)
HGB BLD-MCNC: 10.8 G/DL — LOW (ref 11.5–15.5)
MAGNESIUM SERPL-MCNC: 2.2 MG/DL — SIGNIFICANT CHANGE UP (ref 1.6–2.6)
MCHC RBC-ENTMCNC: 28.3 PG — SIGNIFICANT CHANGE UP (ref 27–34)
MCHC RBC-ENTMCNC: 30.3 G/DL — LOW (ref 32–36)
MCV RBC AUTO: 93.2 FL — SIGNIFICANT CHANGE UP (ref 80–100)
PHOSPHATE SERPL-MCNC: 3.7 MG/DL — SIGNIFICANT CHANGE UP (ref 2.5–4.5)
PLATELET # BLD AUTO: 293 K/UL — SIGNIFICANT CHANGE UP (ref 150–400)
POTASSIUM SERPL-MCNC: 4.5 MMOL/L — SIGNIFICANT CHANGE UP (ref 3.5–5.3)
POTASSIUM SERPL-SCNC: 4.5 MMOL/L — SIGNIFICANT CHANGE UP (ref 3.5–5.3)
RBC # BLD: 3.82 M/UL — SIGNIFICANT CHANGE UP (ref 3.8–5.2)
RBC # FLD: 16.7 % — SIGNIFICANT CHANGE UP (ref 10.3–16.9)
SODIUM SERPL-SCNC: 141 MMOL/L — SIGNIFICANT CHANGE UP (ref 135–145)
WBC # BLD: 6.3 K/UL — SIGNIFICANT CHANGE UP (ref 3.8–10.5)
WBC # FLD AUTO: 6.3 K/UL — SIGNIFICANT CHANGE UP (ref 3.8–10.5)

## 2017-11-08 RX ORDER — I.V. FAT EMULSION 20 G/100ML
50 EMULSION INTRAVENOUS ONCE
Qty: 0 | Refills: 0 | Status: COMPLETED | OUTPATIENT
Start: 2017-11-08 | End: 2017-11-08

## 2017-11-08 RX ORDER — ELECTROLYTE SOLUTION,INJ
1 VIAL (ML) INTRAVENOUS
Qty: 0 | Refills: 0 | Status: DISCONTINUED | OUTPATIENT
Start: 2017-11-08 | End: 2017-11-09

## 2017-11-08 RX ADMIN — ONDANSETRON 4 MILLIGRAM(S): 8 TABLET, FILM COATED ORAL at 06:07

## 2017-11-08 RX ADMIN — Medication 650 MILLIGRAM(S): at 12:32

## 2017-11-08 RX ADMIN — PANTOPRAZOLE SODIUM 40 MILLIGRAM(S): 20 TABLET, DELAYED RELEASE ORAL at 05:58

## 2017-11-08 RX ADMIN — Medication 5 MILLIGRAM(S): at 21:02

## 2017-11-08 RX ADMIN — Medication 650 MILLIGRAM(S): at 13:30

## 2017-11-08 RX ADMIN — Medication 100 UNIT(S): at 12:21

## 2017-11-08 RX ADMIN — I.V. FAT EMULSION 20.83 GRAM(S): 20 EMULSION INTRAVENOUS at 17:33

## 2017-11-08 RX ADMIN — SODIUM CHLORIDE 10 MILLILITER(S): 9 INJECTION INTRAMUSCULAR; INTRAVENOUS; SUBCUTANEOUS at 21:03

## 2017-11-08 RX ADMIN — Medication 650 MILLIGRAM(S): at 21:03

## 2017-11-08 RX ADMIN — Medication 650 MILLIGRAM(S): at 06:07

## 2017-11-08 RX ADMIN — CALCITRIOL 1 MICROGRAM(S): 0.5 CAPSULE ORAL at 12:22

## 2017-11-08 RX ADMIN — ENOXAPARIN SODIUM 30 MILLIGRAM(S): 100 INJECTION SUBCUTANEOUS at 05:58

## 2017-11-08 RX ADMIN — ONDANSETRON 4 MILLIGRAM(S): 8 TABLET, FILM COATED ORAL at 12:38

## 2017-11-08 RX ADMIN — PREGABALIN 1000 MICROGRAM(S): 225 CAPSULE ORAL at 12:21

## 2017-11-08 RX ADMIN — Medication 10 MILLIGRAM(S): at 05:58

## 2017-11-08 RX ADMIN — Medication 650 MILLIGRAM(S): at 22:00

## 2017-11-08 RX ADMIN — LOSARTAN POTASSIUM 50 MILLIGRAM(S): 100 TABLET, FILM COATED ORAL at 05:58

## 2017-11-08 RX ADMIN — Medication 10 MILLIGRAM(S): at 17:32

## 2017-11-08 RX ADMIN — OCTREOTIDE ACETATE 450 MICROGRAM(S): 200 INJECTION, SOLUTION INTRAVENOUS; SUBCUTANEOUS at 12:23

## 2017-11-08 RX ADMIN — Medication 1 APPLICATION(S): at 06:12

## 2017-11-08 RX ADMIN — ESCITALOPRAM OXALATE 20 MILLIGRAM(S): 10 TABLET, FILM COATED ORAL at 21:03

## 2017-11-08 RX ADMIN — Medication 50 MILLIGRAM(S): at 21:02

## 2017-11-08 RX ADMIN — ENOXAPARIN SODIUM 30 MILLIGRAM(S): 100 INJECTION SUBCUTANEOUS at 17:32

## 2017-11-08 RX ADMIN — Medication 1 EACH: at 17:32

## 2017-11-08 RX ADMIN — ONDANSETRON 4 MILLIGRAM(S): 8 TABLET, FILM COATED ORAL at 21:03

## 2017-11-08 NOTE — CHART NOTE - NSCHARTNOTEFT_GEN_A_CORE
Admitting Diagnosis:   Patient is a 57y old  Female who presents with a chief complaint of enterocutaneous fistula (24 Jul 2017 14:15)      PAST MEDICAL & SURGICAL HISTORY:  Reflux  Obesity  DVT (deep venous thrombosis): 2013 neck  Diverticulitis  Hypertension  Elective surgery: abodominal wall surgery  dec 2016  Elective surgery: NOV 2016 gastric bypass revision  Gastric bypass status for obesity: gastric sleeve, 6/2012  S/P breast biopsy: 2011, left  S/P colon resection: 2011  S/P knee surgery: repair 2009, 2011  right; left  Umbilical hernia: 2000  S/P appendectomy: 1975  S/P tonsillectomy: 1967    Current Nutrition Order:   >> NPO with Ice Chips/Sips of Water   >> TPN via central venous line:  1752 TV (73ml/hr); 355g D, 140g AA, no lipids today (1767 kcal, 2.7g/kg IBW pro, GIR 3.30)  >> Jevity 1.2 @10ml/hr x24hrs via NGT in fistula (240ml TV, 288kcal, 13g pro, 194ml FW)    PO Intake: Good (%) [   ]  Fair (50-75%) [   ] Poor (<25%) [   ] N/A    GI Issues: Pt reports no GI distress at present.  Currently 1 NGT in fistula for feeds and 2nd placed for suction.    Pain: Pain controlled.     Skin Integrity: intra luminal vac being taken out, unstageable pressure ulcer to sacral spine    Labs:   11-08    141  |  102  |  34<H>  ----------------------------<  114<H>  4.5   |  28  |  0.58    Ca    10.2      08 Nov 2017 06:35  Phos  3.7     11-08  Mg     2.2     11-08      CAPILLARY BLOOD GLUCOSE  110 (07 Nov 2017 17:00)      POCT Blood Glucose.: 112 mg/dL (08 Nov 2017 12:02)  POCT Blood Glucose.: 143 mg/dL (08 Nov 2017 05:33)  POCT Blood Glucose.: 149 mg/dL (08 Nov 2017 00:25)  POCT Blood Glucose.: 110 mg/dL (07 Nov 2017 16:59)      Medications:  MEDICATIONS  (STANDING):  calcitriol Injectable 1 MICROGram(s) IV Push <User Schedule>  collagenase Ointment 1 Application(s) Topical daily  cyanocobalamin Injectable 1000 MICROGram(s) SubCutaneous every 7 days  dextrose 5%. 1000 milliLiter(s) (50 mL/Hr) IV Continuous <Continuous>  dextrose 50% Injectable 25 Gram(s) IV Push once  dextrose 50% Injectable 12.5 Gram(s) IV Push once  diphenhydrAMINE   Injectable 50 milliGRAM(s) IV Push at bedtime  enoxaparin Injectable 30 milliGRAM(s) SubCutaneous two times a day  escitalopram 20 milliGRAM(s) Oral daily  fat emulsion (Plant Based) 20% IVPB 50 Gram(s) IV Intermittent once  heparin  flush 100 Units/mL Injectable 100 Unit(s) IV Push every other day  insulin lispro (HumaLOG) corrective regimen sliding scale   SubCutaneous every 6 hours  losartan 50 milliGRAM(s) Oral daily  nystatin Powder 1 Application(s) Topical two times a day  octreotide  Injectable 450 MICROGram(s) IV Push daily  oxybutynin 10 milliGRAM(s) Oral two times a day  pantoprazole  Injectable 40 milliGRAM(s) IV Push daily  Parenteral Nutrition - Adult 1 Each (73 mL/Hr) TPN Continuous <Continuous>  Parenteral Nutrition - Adult 1 Each (73 mL/Hr) TPN Continuous <Continuous>  Parenteral Nutrition - Adult 1 Each (73 mL/Hr) TPN Continuous <Continuous>  Parenteral Nutrition - Adult 1 Each (73 mL/Hr) TPN Continuous <Continuous>  Parenteral Nutrition - Adult 1 Each (73 mL/Hr) TPN Continuous <Continuous>  Parenteral Nutrition - Adult 1 Each (73 mL/Hr) TPN Continuous <Continuous>  Parenteral Nutrition - Adult 1 Each (73 mL/Hr) TPN Continuous <Continuous>  sodium chloride 0.9% lock flush 20 milliLiter(s) IV Push once    MEDICATIONS  (PRN):  acetaminophen   Tablet. 650 milliGRAM(s) Oral every 6 hours PRN Moderate Pain (4 - 6)  ondansetron Injectable 4 milliGRAM(s) IV Push every 6 hours PRN Nausea and/or Vomiting  sodium chloride 0.9% lock flush 10 milliLiter(s) IV Push every 1 hour PRN After each medication administration  sodium chloride 0.9% lock flush 10 milliLiter(s) IV Push every 12 hours PRN Lumen of catheter NOT used      Bed Scale:   160.5lb (11/8) Bed scale  164lb (10/17)  219lb (8/1)    Weight Change:   Indicates a 55lb wt loss in 2.5 months. (25% original BW)  However, suspect inaccuracy of documented wt from 8/1 2/2 Wt being relatively stable over last year (see below)  March 2016: 89kg  February 2017: 74kg   July 2017: 76kg  Please continue to obtain wts for trending.      Estimated energy needs:   Estimated energy needs using 52 kg IBW:  increased needs per wound and pressure ulcer healing. needs calculated based on TPN as primary route of nutrition.  30-35 kcal/kg (5221-5089 kcal).   1.8-2 g/kg ( g protein).  30-35 mL/kg (6287-8737 mL fluid).     Subjective:   Pt seen resting in bed. TPN bag infusing at 73ml/hr providing 355g Dex, 140g AA (1767 kcal, 2.7g/kg IBW pro, GIR 3.30). Enteral continuous feeds of Jevity 1.2 @10ml/hr x24hrs via NGT in fistula (240ml TV, 288kcal, 13g pro, 194ml FW). pt tolerating feeds well. No apparent GI distress. Will adjust needs based on lab values and further wt changes. Noted: bed scale wt indicates mild weight loss over last few weeks despite receiving 1700-2300calories/day. Will adjust nutrition recs if pre-alb is not WNL and wt continues to trend down. Please obtain recent wt for trending.    Previous Nutrition Diagnosis: Increased nutrient needs RT increased demand for nutrients AEB wound and pressure ulcer healing   Active [  X]  Resolved [   ]    Goal: PT to meet >75% EER via tolerated route    Recommendations:  1) Continue w/ current TPN order  2) Please continue to take standing weight for trending purposes  3) PO diet per MD discretion   4) Continue to check labs: pre-alb, triglycerides, BG & FS and adjust TPN per MD discretion  5) Check labs for signs of fat malabsorption, consider ADEK vitamin.    Education: Understands meeting nutrient needs via TPN.    Risk Level: High [ X  ] Moderate [   ] Low [   ].

## 2017-11-08 NOTE — PROGRESS NOTE ADULT - SUBJECTIVE AND OBJECTIVE BOX
patient is stable without change  intra luminal vac being taken out   reluctant to suture as would have to do with each change  will try strain relieve  will discuss plan with dr marmolejo  as per timing of surgery  would like to see if can get the intra luminal vac to work but at present difficult to keep in place  luca seems fine

## 2017-11-08 NOTE — PROGRESS NOTE ADULT - ASSESSMENT
57 F s/p  SBR, fistula resection, esophagojejunostomy revision w/ post-op course complicated by RTOR for distal anastomosis breakdown, ATN, acute respiratory failure, & septic shock, MRSA bacteremia which resolved. Currently for wound care management.   NPO with sips and chips /TPN/IVF with TF @5cc/hr  Pain/nausea control - only benadryl at midnight and dilaudid 0.25mg during PT session   ISS/OOB with PT daily  Nystatin powder, vancomycin 750 Q12 (10/11-11/8)  SCDs, lovenox, OOBA  Lines :  L left subclavian line (10/4-- )  AM labs, weekly albumin, pre-albumin and triglyceride (every friday)  Wet to dry dressing in place  NGT in fistula for tube feeds

## 2017-11-08 NOTE — PROGRESS NOTE ADULT - SUBJECTIVE AND OBJECTIVE BOX
O/N: BRAYDEN, VSS, d/c'd vanc  11/7: Dressing changed both tubes functioning , VANCO stop tomorro    STATUS POST:  7/24: SBR resection, fistula resection, revision of esophagogegunostomy drain through esophageal hiatus in R mediastinum  7/29: Ex-lap, SB anastamosis in BP limb breakdown with succus throughout abdomen  8/1: abd washout, drainage of abscess, biologic mesh placement, closure of abdominal wall, vac and retention suture  8/7: abd washout, mesh removal, frozen abd noted, LLQ CHECO replaced with benson drain  8/10: leak noted from previous anastamosis site on L side, mid abdomen malecot drain placed in enterotomy, JPs x 2 placed, necrotic fascia debrided, vicryl mesh sutured to fascia circumferentially, xeroform over mesh then vac dressing placed

## 2017-11-09 LAB
ALBUMIN SERPL ELPH-MCNC: 3.1 G/DL — LOW (ref 3.3–5)
ALP SERPL-CCNC: 287 U/L — HIGH (ref 40–120)
ALT FLD-CCNC: 43 U/L — SIGNIFICANT CHANGE UP (ref 10–45)
ANION GAP SERPL CALC-SCNC: 13 MMOL/L — SIGNIFICANT CHANGE UP (ref 5–17)
AST SERPL-CCNC: 32 U/L — SIGNIFICANT CHANGE UP (ref 10–40)
BILIRUB SERPL-MCNC: 0.6 MG/DL — SIGNIFICANT CHANGE UP (ref 0.2–1.2)
BUN SERPL-MCNC: 41 MG/DL — HIGH (ref 7–23)
CALCIUM SERPL-MCNC: 10.1 MG/DL — SIGNIFICANT CHANGE UP (ref 8.4–10.5)
CHLORIDE SERPL-SCNC: 100 MMOL/L — SIGNIFICANT CHANGE UP (ref 96–108)
CO2 SERPL-SCNC: 29 MMOL/L — SIGNIFICANT CHANGE UP (ref 22–31)
CREAT SERPL-MCNC: 0.61 MG/DL — SIGNIFICANT CHANGE UP (ref 0.5–1.3)
GLUCOSE BLDC GLUCOMTR-MCNC: 110 MG/DL — HIGH (ref 70–99)
GLUCOSE BLDC GLUCOMTR-MCNC: 123 MG/DL — HIGH (ref 70–99)
GLUCOSE BLDC GLUCOMTR-MCNC: 130 MG/DL — HIGH (ref 70–99)
GLUCOSE SERPL-MCNC: 120 MG/DL — HIGH (ref 70–99)
HCT VFR BLD CALC: 35.1 % — SIGNIFICANT CHANGE UP (ref 34.5–45)
HGB BLD-MCNC: 10.3 G/DL — LOW (ref 11.5–15.5)
MAGNESIUM SERPL-MCNC: 2.3 MG/DL — SIGNIFICANT CHANGE UP (ref 1.6–2.6)
MCHC RBC-ENTMCNC: 27.8 PG — SIGNIFICANT CHANGE UP (ref 27–34)
MCHC RBC-ENTMCNC: 29.3 G/DL — LOW (ref 32–36)
MCV RBC AUTO: 94.6 FL — SIGNIFICANT CHANGE UP (ref 80–100)
PHOSPHATE SERPL-MCNC: 3.9 MG/DL — SIGNIFICANT CHANGE UP (ref 2.5–4.5)
PLATELET # BLD AUTO: 312 K/UL — SIGNIFICANT CHANGE UP (ref 150–400)
POTASSIUM SERPL-MCNC: 4.4 MMOL/L — SIGNIFICANT CHANGE UP (ref 3.5–5.3)
POTASSIUM SERPL-SCNC: 4.4 MMOL/L — SIGNIFICANT CHANGE UP (ref 3.5–5.3)
PROT SERPL-MCNC: 7.1 G/DL — SIGNIFICANT CHANGE UP (ref 6–8.3)
RBC # BLD: 3.71 M/UL — LOW (ref 3.8–5.2)
RBC # FLD: 16.7 % — SIGNIFICANT CHANGE UP (ref 10.3–16.9)
SODIUM SERPL-SCNC: 142 MMOL/L — SIGNIFICANT CHANGE UP (ref 135–145)
WBC # BLD: 5.4 K/UL — SIGNIFICANT CHANGE UP (ref 3.8–10.5)
WBC # FLD AUTO: 5.4 K/UL — SIGNIFICANT CHANGE UP (ref 3.8–10.5)

## 2017-11-09 RX ORDER — ACETAMINOPHEN 500 MG
325 TABLET ORAL EVERY 4 HOURS
Qty: 0 | Refills: 0 | Status: DISCONTINUED | OUTPATIENT
Start: 2017-11-09 | End: 2017-12-10

## 2017-11-09 RX ORDER — ELECTROLYTE SOLUTION,INJ
1 VIAL (ML) INTRAVENOUS
Qty: 0 | Refills: 0 | Status: DISCONTINUED | OUTPATIENT
Start: 2017-11-09 | End: 2017-11-09

## 2017-11-09 RX ORDER — HYDROMORPHONE HYDROCHLORIDE 2 MG/ML
0.25 INJECTION INTRAMUSCULAR; INTRAVENOUS; SUBCUTANEOUS ONCE
Qty: 0 | Refills: 0 | Status: DISCONTINUED | OUTPATIENT
Start: 2017-11-09 | End: 2017-11-10

## 2017-11-09 RX ADMIN — NYSTATIN CREAM 1 APPLICATION(S): 100000 CREAM TOPICAL at 05:04

## 2017-11-09 RX ADMIN — Medication 325 MILLIGRAM(S): at 11:55

## 2017-11-09 RX ADMIN — Medication 325 MILLIGRAM(S): at 22:25

## 2017-11-09 RX ADMIN — Medication 325 MILLIGRAM(S): at 18:26

## 2017-11-09 RX ADMIN — Medication 325 MILLIGRAM(S): at 16:10

## 2017-11-09 RX ADMIN — ONDANSETRON 4 MILLIGRAM(S): 8 TABLET, FILM COATED ORAL at 18:57

## 2017-11-09 RX ADMIN — ONDANSETRON 4 MILLIGRAM(S): 8 TABLET, FILM COATED ORAL at 05:04

## 2017-11-09 RX ADMIN — Medication 10 MILLIGRAM(S): at 05:04

## 2017-11-09 RX ADMIN — Medication 50 MILLIGRAM(S): at 21:53

## 2017-11-09 RX ADMIN — Medication 1 APPLICATION(S): at 05:04

## 2017-11-09 RX ADMIN — ENOXAPARIN SODIUM 30 MILLIGRAM(S): 100 INJECTION SUBCUTANEOUS at 18:56

## 2017-11-09 RX ADMIN — Medication 5 MILLIGRAM(S): at 21:53

## 2017-11-09 RX ADMIN — ESCITALOPRAM OXALATE 20 MILLIGRAM(S): 10 TABLET, FILM COATED ORAL at 21:52

## 2017-11-09 RX ADMIN — Medication 73 EACH: at 18:59

## 2017-11-09 RX ADMIN — Medication 650 MILLIGRAM(S): at 05:47

## 2017-11-09 RX ADMIN — Medication 325 MILLIGRAM(S): at 21:53

## 2017-11-09 RX ADMIN — Medication 325 MILLIGRAM(S): at 12:55

## 2017-11-09 RX ADMIN — Medication 650 MILLIGRAM(S): at 05:04

## 2017-11-09 RX ADMIN — LOSARTAN POTASSIUM 50 MILLIGRAM(S): 100 TABLET, FILM COATED ORAL at 05:07

## 2017-11-09 RX ADMIN — OCTREOTIDE ACETATE 450 MICROGRAM(S): 200 INJECTION, SOLUTION INTRAVENOUS; SUBCUTANEOUS at 11:56

## 2017-11-09 RX ADMIN — Medication 10 MILLIGRAM(S): at 18:57

## 2017-11-09 RX ADMIN — ONDANSETRON 4 MILLIGRAM(S): 8 TABLET, FILM COATED ORAL at 11:57

## 2017-11-09 RX ADMIN — ENOXAPARIN SODIUM 30 MILLIGRAM(S): 100 INJECTION SUBCUTANEOUS at 05:05

## 2017-11-09 RX ADMIN — PANTOPRAZOLE SODIUM 40 MILLIGRAM(S): 20 TABLET, DELAYED RELEASE ORAL at 05:05

## 2017-11-09 NOTE — PROGRESS NOTE ADULT - SUBJECTIVE AND OBJECTIVE BOX
On interval followup, the left subclavian venous catheter site is clean, dry and non-inflamed. T 99 range. Notes and cultures are followed.

## 2017-11-09 NOTE — PROGRESS NOTE ADULT - ASSESSMENT
57 F s/p  SBR, fistula resection, esophagojejunostomy revision w/ post-op course complicated by RTOR for distal anastomosis breakdown, ATN, acute respiratory failure, & septic shock, MRSA bacteremia which resolved. Currently for wound care management.   NPO with sips and chips /TPN/IVF with TF @5cc/hr  Pain/nausea control - only benadryl at midnight and dilaudid 0.25mg during PT session   ISS/OOB with PT daily  Nystatin powder, vancomycin 750 Q12 (10/11-11/8)  SCDs, lovenox, OOBA  Lines :  L left subclavian line (10/4-- )  AM labs, weekly albumin, pre-albumin and triglyceride (every friday)  Wet to dry dressing in place  NGT in fistula for tube feeds 57 F s/p  SBR, fistula resection, esophagojejunostomy revision w/ post-op course complicated by RTOR for distal anastomosis breakdown, ATN, acute respiratory failure, & septic shock, MRSA bacteremia which resolved. Currently for wound care management.       NPO with sips and chips /TPN/IVF with TF @5cc/hr  Pain/nausea control - only benadryl at midnight and dilaudid 0.25mg during PT session   ISS/OOB with PT daily  Nystatin powder  SCDs, lovenox, OOBA  Lines :  L left subclavian line (10/4-- )  AM labs, weekly albumin, pre-albumin and triglyceride (every friday)  Wet to dry dressing in place  NGT in fistula for tube feeds

## 2017-11-09 NOTE — PROGRESS NOTE ADULT - SUBJECTIVE AND OBJECTIVE BOX
O/N: BRAYDEN, VSS  11/8: Both abd tubes out Dr. Caitlyn pabon , VSS.     Surgeries:  7/24: SBR resection, fistula resection, revision of esophagogegunostomy drain through esophageal hiatus in R mediastinum  7/29: Ex-lap, SB anastamosis in BP limb breakdown with succus throughout abdomen  8/1: abd washout, drainage of abscess, biologic mesh placement, closure of abdominal wall, vac and retention suture  8/7: abd washout, mesh removal, frozen abd noted, LLQ CHECO replaced with benson drain  8/10: leak noted from previous anastamosis site on L side, mid abdomen malecot drain placed in enterotomy, JPs x 2 placed, necrotic fascia debrided, vicryl mesh sutured to fascia circumferentially, xeroform over mesh then vac dressing placed O/N: BRAYDEN, ARMANDO  11/8: Both abd tubes out Dr. Caitlyn pabon , VSS.     Surgeries:  7/24: SBR resection, fistula resection, revision of esophagogegunostomy drain through esophageal hiatus in R mediastinum  7/29: Ex-lap, SB anastamosis in BP limb breakdown with succus throughout abdomen  8/1: abd washout, drainage of abscess, biologic mesh placement, closure of abdominal wall, vac and retention suture  8/7: abd washout, mesh removal, frozen abd noted, LLQ CHECO replaced with benson drain  8/10: leak noted from previous anastamosis site on L side, mid abdomen malecot drain placed in enterotomy, JPs x 2 placed, necrotic fascia debrided, vicryl mesh sutured to fascia circumferentially, xeroform over mesh then vac dressing placed	        SUBJECTIVE: Patient seen and examined bedside by chief resident. No nausea/vomiting, pain is controlled. NGT intact    enoxaparin Injectable 30 milliGRAM(s) SubCutaneous two times a day  heparin  flush 100 Units/mL Injectable 100 Unit(s) IV Push every other day  losartan 50 milliGRAM(s) Oral daily      Vital Signs Last 24 Hrs  T(C): 36.7 (09 Nov 2017 05:00), Max: 37.3 (08 Nov 2017 13:55)  T(F): 98.1 (09 Nov 2017 05:00), Max: 99.2 (08 Nov 2017 13:55)  HR: 81 (09 Nov 2017 05:00) (70 - 86)  BP: 123/82 (09 Nov 2017 05:00) (114/75 - 132/89)  BP(mean): --  RR: 17 (09 Nov 2017 05:00) (16 - 17)  SpO2: 94% (09 Nov 2017 05:00) (94% - 97%)  I&O's Detail    08 Nov 2017 07:01  -  09 Nov 2017 07:00  --------------------------------------------------------  IN:    Fat Emulsion 20%: 270.4 mL    TPN (Total Parenteral Nutrition): 1752 mL  Total IN: 2022.4 mL    OUT:  Total OUT: 0 mL    Total NET: 2022.4 mL          General: NAD, resting comfortably in bed  C/V: NSR  Pulm: Nonlabored breathing, no respiratory distress  Abd: soft, non distended, non tender, wound is clean around [id number]. 4 fistulas. NGT intact.  Extrem: WWP, no edema, SCDs in place        LABS:                        10.8   6.3   )-----------( 293      ( 08 Nov 2017 06:35 )             35.6     11-08    141  |  102  |  34<H>  ----------------------------<  114<H>  4.5   |  28  |  0.58    Ca    10.2      08 Nov 2017 06:35  Phos  3.7     11-08  Mg     2.2     11-08            RADIOLOGY & ADDITIONAL STUDIES:

## 2017-11-10 LAB
ALBUMIN SERPL ELPH-MCNC: 3.2 G/DL — LOW (ref 3.3–5)
ANION GAP SERPL CALC-SCNC: 12 MMOL/L — SIGNIFICANT CHANGE UP (ref 5–17)
BUN SERPL-MCNC: 39 MG/DL — HIGH (ref 7–23)
CALCIUM SERPL-MCNC: 10.1 MG/DL — SIGNIFICANT CHANGE UP (ref 8.4–10.5)
CHLORIDE SERPL-SCNC: 102 MMOL/L — SIGNIFICANT CHANGE UP (ref 96–108)
CO2 SERPL-SCNC: 27 MMOL/L — SIGNIFICANT CHANGE UP (ref 22–31)
CREAT SERPL-MCNC: 0.55 MG/DL — SIGNIFICANT CHANGE UP (ref 0.5–1.3)
GLUCOSE BLDC GLUCOMTR-MCNC: 101 MG/DL — HIGH (ref 70–99)
GLUCOSE BLDC GLUCOMTR-MCNC: 111 MG/DL — HIGH (ref 70–99)
GLUCOSE BLDC GLUCOMTR-MCNC: 141 MG/DL — HIGH (ref 70–99)
GLUCOSE SERPL-MCNC: 86 MG/DL — SIGNIFICANT CHANGE UP (ref 70–99)
GRAM STN FLD: SIGNIFICANT CHANGE UP
GRAM STN FLD: SIGNIFICANT CHANGE UP
HCT VFR BLD CALC: 34.9 % — SIGNIFICANT CHANGE UP (ref 34.5–45)
HGB BLD-MCNC: 10.5 G/DL — LOW (ref 11.5–15.5)
MAGNESIUM SERPL-MCNC: 2.2 MG/DL — SIGNIFICANT CHANGE UP (ref 1.6–2.6)
MCHC RBC-ENTMCNC: 28.1 PG — SIGNIFICANT CHANGE UP (ref 27–34)
MCHC RBC-ENTMCNC: 30.1 G/DL — LOW (ref 32–36)
MCV RBC AUTO: 93.3 FL — SIGNIFICANT CHANGE UP (ref 80–100)
PHOSPHATE SERPL-MCNC: 3.8 MG/DL — SIGNIFICANT CHANGE UP (ref 2.5–4.5)
PLATELET # BLD AUTO: 303 K/UL — SIGNIFICANT CHANGE UP (ref 150–400)
POTASSIUM SERPL-MCNC: 4.5 MMOL/L — SIGNIFICANT CHANGE UP (ref 3.5–5.3)
POTASSIUM SERPL-SCNC: 4.5 MMOL/L — SIGNIFICANT CHANGE UP (ref 3.5–5.3)
PREALB SERPL-MCNC: 34 MG/DL — SIGNIFICANT CHANGE UP (ref 20–40)
RBC # BLD: 3.74 M/UL — LOW (ref 3.8–5.2)
RBC # FLD: 16.4 % — SIGNIFICANT CHANGE UP (ref 10.3–16.9)
SODIUM SERPL-SCNC: 141 MMOL/L — SIGNIFICANT CHANGE UP (ref 135–145)
WBC # BLD: 6.4 K/UL — SIGNIFICANT CHANGE UP (ref 3.8–10.5)
WBC # FLD AUTO: 6.4 K/UL — SIGNIFICANT CHANGE UP (ref 3.8–10.5)

## 2017-11-10 RX ORDER — DIAZEPAM 5 MG
5 TABLET ORAL AT BEDTIME
Qty: 0 | Refills: 0 | Status: DISCONTINUED | OUTPATIENT
Start: 2017-11-11 | End: 2017-11-18

## 2017-11-10 RX ORDER — ELECTROLYTE SOLUTION,INJ
1 VIAL (ML) INTRAVENOUS
Qty: 0 | Refills: 0 | Status: DISCONTINUED | OUTPATIENT
Start: 2017-11-10 | End: 2017-11-10

## 2017-11-10 RX ORDER — I.V. FAT EMULSION 20 G/100ML
50 EMULSION INTRAVENOUS ONCE
Qty: 0 | Refills: 0 | Status: COMPLETED | OUTPATIENT
Start: 2017-11-10 | End: 2017-11-11

## 2017-11-10 RX ORDER — HYDROMORPHONE HYDROCHLORIDE 2 MG/ML
0.25 INJECTION INTRAMUSCULAR; INTRAVENOUS; SUBCUTANEOUS ONCE
Qty: 0 | Refills: 0 | Status: DISCONTINUED | OUTPATIENT
Start: 2017-11-10 | End: 2017-11-10

## 2017-11-10 RX ADMIN — ENOXAPARIN SODIUM 30 MILLIGRAM(S): 100 INJECTION SUBCUTANEOUS at 18:29

## 2017-11-10 RX ADMIN — Medication 325 MILLIGRAM(S): at 05:13

## 2017-11-10 RX ADMIN — ESCITALOPRAM OXALATE 20 MILLIGRAM(S): 10 TABLET, FILM COATED ORAL at 21:42

## 2017-11-10 RX ADMIN — OCTREOTIDE ACETATE 450 MICROGRAM(S): 200 INJECTION, SOLUTION INTRAVENOUS; SUBCUTANEOUS at 11:23

## 2017-11-10 RX ADMIN — Medication 325 MILLIGRAM(S): at 19:17

## 2017-11-10 RX ADMIN — HYDROMORPHONE HYDROCHLORIDE 0.25 MILLIGRAM(S): 2 INJECTION INTRAMUSCULAR; INTRAVENOUS; SUBCUTANEOUS at 14:28

## 2017-11-10 RX ADMIN — HYDROMORPHONE HYDROCHLORIDE 0.25 MILLIGRAM(S): 2 INJECTION INTRAMUSCULAR; INTRAVENOUS; SUBCUTANEOUS at 14:35

## 2017-11-10 RX ADMIN — Medication 100 UNIT(S): at 11:23

## 2017-11-10 RX ADMIN — HYDROMORPHONE HYDROCHLORIDE 0.25 MILLIGRAM(S): 2 INJECTION INTRAMUSCULAR; INTRAVENOUS; SUBCUTANEOUS at 09:00

## 2017-11-10 RX ADMIN — PANTOPRAZOLE SODIUM 40 MILLIGRAM(S): 20 TABLET, DELAYED RELEASE ORAL at 05:13

## 2017-11-10 RX ADMIN — Medication 5 MILLIGRAM(S): at 21:42

## 2017-11-10 RX ADMIN — ONDANSETRON 4 MILLIGRAM(S): 8 TABLET, FILM COATED ORAL at 18:30

## 2017-11-10 RX ADMIN — NYSTATIN CREAM 1 APPLICATION(S): 100000 CREAM TOPICAL at 05:15

## 2017-11-10 RX ADMIN — ENOXAPARIN SODIUM 30 MILLIGRAM(S): 100 INJECTION SUBCUTANEOUS at 05:13

## 2017-11-10 RX ADMIN — Medication 1 EACH: at 18:29

## 2017-11-10 RX ADMIN — Medication 10 MILLIGRAM(S): at 05:13

## 2017-11-10 RX ADMIN — Medication 1 APPLICATION(S): at 05:15

## 2017-11-10 RX ADMIN — Medication 10 MILLIGRAM(S): at 18:30

## 2017-11-10 RX ADMIN — CALCITRIOL 1 MICROGRAM(S): 0.5 CAPSULE ORAL at 11:24

## 2017-11-10 RX ADMIN — HYDROMORPHONE HYDROCHLORIDE 0.25 MILLIGRAM(S): 2 INJECTION INTRAMUSCULAR; INTRAVENOUS; SUBCUTANEOUS at 09:15

## 2017-11-10 RX ADMIN — Medication 325 MILLIGRAM(S): at 18:30

## 2017-11-10 RX ADMIN — NYSTATIN CREAM 1 APPLICATION(S): 100000 CREAM TOPICAL at 18:30

## 2017-11-10 RX ADMIN — LOSARTAN POTASSIUM 50 MILLIGRAM(S): 100 TABLET, FILM COATED ORAL at 05:13

## 2017-11-10 RX ADMIN — Medication 325 MILLIGRAM(S): at 05:43

## 2017-11-10 RX ADMIN — Medication 50 MILLIGRAM(S): at 21:42

## 2017-11-10 NOTE — PROGRESS NOTE ADULT - ASSESSMENT
57 F s/p  SBR, fistula resection, esophagojejunostomy revision w/ post-op course complicated by RTOR for distal anastomosis breakdown, ATN, acute respiratory failure, & septic shock, MRSA bacteremia which resolved. Currently for wound care management.   NPO with sips and chips /TPN/IVF with TF @5cc/hr  Pain/nausea control - only benadryl at midnight and dilaudid 0.25mg during PT session   ISS/OOB with PT daily  Nystatin powder, vancomycin 750 Q12 (10/11-11/8)  SCDs, lovenox, OOBA  Lines :  L left subclavian line (10/4-- )  AM labs, weekly albumin, pre-albumin and triglyceride (every friday)  Wet to dry dressing in place  NGT in fistula for tube feeds 57 F s/p  SBR, fistula resection, esophagojejunostomy revision w/ post-op course complicated by RTOR for distal anastomosis breakdown, ATN, acute respiratory failure, & septic shock, MRSA bacteremia which resolved. Currently for wound care management.     NPO with sips and chips /TPN/IVF   Pain/nausea control - only benadryl at midnight and dilaudid 0.25mg during PT session   ISS/OOB with PT daily  Nystatin powder, vancomycin 750 Q12 (10/11-11/8)  SCDs, lovenox, OOBA  Lines :  L left subclavian line (10/4-- )  AM labs, weekly albumin, pre-albumin and triglyceride (every friday)  Wet to dry dressing in place  NGT in fistula for tube feeds  Follow up culture result

## 2017-11-10 NOTE — PROGRESS NOTE ADULT - SUBJECTIVE AND OBJECTIVE BOX
O/N: BRAYDEN, VSS  11/9:Dressing changed , wound cultured     STATUS POST:  7/24: SBR resection, fistula resection, revision of esophagogegunostomy drain through esophageal hiatus in R mediastinum  7/29: Ex-lap, SB anastamosis in BP limb breakdown with succus throughout abdomen  8/1: abd washout, drainage of abscess, biologic mesh placement, closure of abdominal wall, vac and retention suture  8/7: abd washout, mesh removal, frozen abd noted, LLQ CHECO replaced with benson drain  8/10: leak noted from previous anastamosis site on L side, mid abdomen malecot drain placed in enterotomy, JPs x 2 placed, necrotic fascia debrided, vicryl mesh sutured to fascia circumferentially, xeroform over mesh then vac dressing placed O/N: BRAYDEN, VSS  11/9:Dressing changed , wound cultured     STATUS POST:  7/24: SBR resection, fistula resection, revision of esophagogegunostomy drain through esophageal hiatus in R mediastinum  7/29: Ex-lap, SB anastamosis in BP limb breakdown with succus throughout abdomen  8/1: abd washout, drainage of abscess, biologic mesh placement, closure of abdominal wall, vac and retention suture  8/7: abd washout, mesh removal, frozen abd noted, LLQ CHECO replaced with benson drain  8/10: leak noted from previous anastamosis site on L side, mid abdomen malecot drain placed in enterotomy, JPs x 2 placed, necrotic fascia debrided, vicryl mesh sutured to fascia circumferentially, xeroform over mesh then vac dressing placed     SUBJECTIVE: Patient seen and examined bedside by chief resident. No active complains, no fever, no nausea/vomiting,     enoxaparin Injectable 30 milliGRAM(s) SubCutaneous two times a day  heparin  flush 100 Units/mL Injectable 100 Unit(s) IV Push every other day  losartan 50 milliGRAM(s) Oral daily      Vital Signs Last 24 Hrs  T(C): 37.6 (10 Nov 2017 05:53), Max: 37.6 (10 Nov 2017 05:53)  T(F): 99.7 (10 Nov 2017 05:53), Max: 99.7 (10 Nov 2017 05:53)  HR: 68 (10 Nov 2017 05:53) (65 - 68)  BP: 149/81 (10 Nov 2017 05:53) (131/75 - 149/87)  BP(mean): --  RR: 17 (10 Nov 2017 05:53) (16 - 18)  SpO2: 95% (10 Nov 2017 05:53) (95% - 96%)  I&O's Detail    09 Nov 2017 07:01  -  10 Nov 2017 07:00  --------------------------------------------------------  IN:    TPN (Total Parenteral Nutrition): 1606 mL  Total IN: 1606 mL    OUT:  Total OUT: 0 mL    Total NET: 1606 mL          General: NAD, resting comfortably in bed  C/V: NSR  Pulm: Nonlabored breathing, no respiratory distress  Abd: soft, NT/ND fistula output +, greenish and yellowish fluid, wound [id number], 4 fistulas  Extrem: WWP, no edema, SCDs in place        LABS:                        10.3   5.4   )-----------( 312      ( 09 Nov 2017 08:27 )             35.1     11-09    142  |  100  |  41<H>  ----------------------------<  120<H>  4.4   |  29  |  0.61    Ca    10.1      09 Nov 2017 08:27  Phos  3.9     11-09  Mg     2.3     11-09    TPro  7.1  /  Alb  3.1<L>  /  TBili  0.6  /  DBili  x   /  AST  32  /  ALT  43  /  AlkPhos  287<H>  11-09          RADIOLOGY & ADDITIONAL STUDIES:

## 2017-11-11 LAB
-  AMPICILLIN/SULBACTAM: SIGNIFICANT CHANGE UP
-  AMPICILLIN/SULBACTAM: SIGNIFICANT CHANGE UP
-  AMPICILLIN: SIGNIFICANT CHANGE UP
-  AMPICILLIN: SIGNIFICANT CHANGE UP
-  CEFAZOLIN: SIGNIFICANT CHANGE UP
-  CEFTRIAXONE: SIGNIFICANT CHANGE UP
-  CEFTRIAXONE: SIGNIFICANT CHANGE UP
-  CIPROFLOXACIN: SIGNIFICANT CHANGE UP
-  CIPROFLOXACIN: SIGNIFICANT CHANGE UP
-  CLINDAMYCIN: SIGNIFICANT CHANGE UP
-  DAPTOMYCIN: SIGNIFICANT CHANGE UP
-  ERYTHROMYCIN: SIGNIFICANT CHANGE UP
-  GENTAMICIN: SIGNIFICANT CHANGE UP
-  GENTAMICIN: SIGNIFICANT CHANGE UP
-  IMIPENEM: SIGNIFICANT CHANGE UP
-  LINEZOLID: SIGNIFICANT CHANGE UP
-  OXACILLIN: SIGNIFICANT CHANGE UP
-  PENICILLIN: SIGNIFICANT CHANGE UP
-  PIPERACILLIN/TAZOBACTAM: SIGNIFICANT CHANGE UP
-  PIPERACILLIN/TAZOBACTAM: SIGNIFICANT CHANGE UP
-  RIFAMPIN: SIGNIFICANT CHANGE UP
-  TETRACYCLINE: SIGNIFICANT CHANGE UP
-  TOBRAMYCIN: SIGNIFICANT CHANGE UP
-  TOBRAMYCIN: SIGNIFICANT CHANGE UP
-  TRIMETHOPRIM/SULFAMETHOXAZOLE: SIGNIFICANT CHANGE UP
-  VANCOMYCIN: SIGNIFICANT CHANGE UP
-  VANCOMYCIN: SIGNIFICANT CHANGE UP
ALBUMIN SERPL ELPH-MCNC: 3.3 G/DL — SIGNIFICANT CHANGE UP (ref 3.3–5)
ALP SERPL-CCNC: 296 U/L — HIGH (ref 40–120)
ALT FLD-CCNC: 41 U/L — SIGNIFICANT CHANGE UP (ref 10–45)
ANION GAP SERPL CALC-SCNC: 13 MMOL/L — SIGNIFICANT CHANGE UP (ref 5–17)
AST SERPL-CCNC: 29 U/L — SIGNIFICANT CHANGE UP (ref 10–40)
BILIRUB SERPL-MCNC: 0.6 MG/DL — SIGNIFICANT CHANGE UP (ref 0.2–1.2)
BUN SERPL-MCNC: 37 MG/DL — HIGH (ref 7–23)
CALCIUM SERPL-MCNC: 10 MG/DL — SIGNIFICANT CHANGE UP (ref 8.4–10.5)
CHLORIDE SERPL-SCNC: 102 MMOL/L — SIGNIFICANT CHANGE UP (ref 96–108)
CO2 SERPL-SCNC: 29 MMOL/L — SIGNIFICANT CHANGE UP (ref 22–31)
CREAT SERPL-MCNC: 0.57 MG/DL — SIGNIFICANT CHANGE UP (ref 0.5–1.3)
CULTURE RESULTS: SIGNIFICANT CHANGE UP
GLUCOSE BLDC GLUCOMTR-MCNC: 113 MG/DL — HIGH (ref 70–99)
GLUCOSE BLDC GLUCOMTR-MCNC: 119 MG/DL — HIGH (ref 70–99)
GLUCOSE BLDC GLUCOMTR-MCNC: 141 MG/DL — HIGH (ref 70–99)
GLUCOSE SERPL-MCNC: 124 MG/DL — HIGH (ref 70–99)
HCT VFR BLD CALC: 34.7 % — SIGNIFICANT CHANGE UP (ref 34.5–45)
HGB BLD-MCNC: 10.4 G/DL — LOW (ref 11.5–15.5)
MAGNESIUM SERPL-MCNC: 2.2 MG/DL — SIGNIFICANT CHANGE UP (ref 1.6–2.6)
MCHC RBC-ENTMCNC: 28 PG — SIGNIFICANT CHANGE UP (ref 27–34)
MCHC RBC-ENTMCNC: 30 G/DL — LOW (ref 32–36)
MCV RBC AUTO: 93.3 FL — SIGNIFICANT CHANGE UP (ref 80–100)
METHOD TYPE: SIGNIFICANT CHANGE UP
ORGANISM # SPEC MICROSCOPIC CNT: SIGNIFICANT CHANGE UP
PHOSPHATE SERPL-MCNC: 3.4 MG/DL — SIGNIFICANT CHANGE UP (ref 2.5–4.5)
PLATELET # BLD AUTO: 307 K/UL — SIGNIFICANT CHANGE UP (ref 150–400)
POTASSIUM SERPL-MCNC: 4.5 MMOL/L — SIGNIFICANT CHANGE UP (ref 3.5–5.3)
POTASSIUM SERPL-SCNC: 4.5 MMOL/L — SIGNIFICANT CHANGE UP (ref 3.5–5.3)
PROT SERPL-MCNC: 7 G/DL — SIGNIFICANT CHANGE UP (ref 6–8.3)
RBC # BLD: 3.72 M/UL — LOW (ref 3.8–5.2)
RBC # FLD: 16.6 % — SIGNIFICANT CHANGE UP (ref 10.3–16.9)
SODIUM SERPL-SCNC: 144 MMOL/L — SIGNIFICANT CHANGE UP (ref 135–145)
SPECIMEN SOURCE: SIGNIFICANT CHANGE UP
TRIGL SERPL-MCNC: 203 MG/DL — HIGH (ref 10–149)
WBC # BLD: 5.5 K/UL — SIGNIFICANT CHANGE UP (ref 3.8–10.5)
WBC # FLD AUTO: 5.5 K/UL — SIGNIFICANT CHANGE UP (ref 3.8–10.5)

## 2017-11-11 RX ORDER — ELECTROLYTE SOLUTION,INJ
1 VIAL (ML) INTRAVENOUS
Qty: 0 | Refills: 0 | Status: DISCONTINUED | OUTPATIENT
Start: 2017-11-11 | End: 2017-11-11

## 2017-11-11 RX ADMIN — OCTREOTIDE ACETATE 450 MICROGRAM(S): 200 INJECTION, SOLUTION INTRAVENOUS; SUBCUTANEOUS at 13:49

## 2017-11-11 RX ADMIN — Medication 10 MILLIGRAM(S): at 18:59

## 2017-11-11 RX ADMIN — Medication 325 MILLIGRAM(S): at 13:46

## 2017-11-11 RX ADMIN — Medication 325 MILLIGRAM(S): at 00:39

## 2017-11-11 RX ADMIN — Medication 325 MILLIGRAM(S): at 19:13

## 2017-11-11 RX ADMIN — ONDANSETRON 4 MILLIGRAM(S): 8 TABLET, FILM COATED ORAL at 23:07

## 2017-11-11 RX ADMIN — Medication 10 MILLIGRAM(S): at 06:59

## 2017-11-11 RX ADMIN — Medication 50 MILLIGRAM(S): at 22:01

## 2017-11-11 RX ADMIN — LOSARTAN POTASSIUM 50 MILLIGRAM(S): 100 TABLET, FILM COATED ORAL at 06:59

## 2017-11-11 RX ADMIN — ONDANSETRON 4 MILLIGRAM(S): 8 TABLET, FILM COATED ORAL at 13:56

## 2017-11-11 RX ADMIN — I.V. FAT EMULSION 20.83 GRAM(S): 20 EMULSION INTRAVENOUS at 00:39

## 2017-11-11 RX ADMIN — ENOXAPARIN SODIUM 30 MILLIGRAM(S): 100 INJECTION SUBCUTANEOUS at 06:59

## 2017-11-11 RX ADMIN — Medication 325 MILLIGRAM(S): at 04:50

## 2017-11-11 RX ADMIN — Medication 5 MILLIGRAM(S): at 23:01

## 2017-11-11 RX ADMIN — PANTOPRAZOLE SODIUM 40 MILLIGRAM(S): 20 TABLET, DELAYED RELEASE ORAL at 07:00

## 2017-11-11 RX ADMIN — Medication 325 MILLIGRAM(S): at 05:35

## 2017-11-11 RX ADMIN — Medication 325 MILLIGRAM(S): at 19:47

## 2017-11-11 RX ADMIN — ENOXAPARIN SODIUM 30 MILLIGRAM(S): 100 INJECTION SUBCUTANEOUS at 18:59

## 2017-11-11 RX ADMIN — Medication 1 APPLICATION(S): at 07:00

## 2017-11-11 RX ADMIN — NYSTATIN CREAM 1 APPLICATION(S): 100000 CREAM TOPICAL at 07:00

## 2017-11-11 RX ADMIN — ESCITALOPRAM OXALATE 20 MILLIGRAM(S): 10 TABLET, FILM COATED ORAL at 23:01

## 2017-11-11 RX ADMIN — NYSTATIN CREAM 1 APPLICATION(S): 100000 CREAM TOPICAL at 19:00

## 2017-11-11 NOTE — PROGRESS NOTE ADULT - ASSESSMENT
57 F s/p  SBR, fistula resection, esophagojejunostomy revision w/ post-op course complicated by RTOR for distal anastomosis breakdown, ATN, acute respiratory failure, & septic shock, MRSA bacteremia which resolved. Currently for wound care management.     NPO with sips and chips /TPN/IVF   Pain/nausea control - only benadryl at midnight and dilaudid 0.25mg during PT session   ISS/OOB with PT daily  Nystatin powder, vancomycin 750 Q12 (10/11-11/8)  SCDs, lovenox, OOBA  Lines :  L left subclavian line (10/4-- )  AM labs, weekly albumin, pre-albumin and triglyceride (every friday)  Wet to dry dressing in place  NGT in fistula for tube feeds 57 F s/p  SBR, fistula resection, esophagojejunostomy revision w/ post-op course complicated by RTOR for distal anastomosis breakdown, ATN, acute respiratory failure, & septic shock, MRSA bacteremia which resolved. Currently for wound care management.     NPO with sips and chips /TPN/IVF   Pain/nausea control  ISS/OOB with PT daily  Nystatin powder, vancomycin 750 Q12 (10/11-11/8)  SCDs, lovenox, OOBA  AM labs, weekly albumin, pre-albumin and triglyceride (every friday)  Wet to dry dressing in place  NGT in fistula for tube feeds  f/u wound cultures

## 2017-11-11 NOTE — PROGRESS NOTE ADULT - SUBJECTIVE AND OBJECTIVE BOX
INTERVAL HPI/OVERNIGHT EVENTS: No acute events, wound culture presumptive positive for MRSA        STATUS POST:  7/24: SBR resection, fistula resection, revision of esophagogegunostomy drain through esophageal hiatus in R mediastinum  7/29: Ex-lap, SB anastamosis in BP limb breakdown with succus throughout abdomen  8/1: abd washout, drainage of abscess, biologic mesh placement, closure of abdominal wall, vac and retention suture  8/7: abd washout, mesh removal, frozen abd noted, LLQ CHECO replaced with benson drain  8/10: leak noted from previous anastamosis site on L side, mid abdomen malecot drain placed in enterotomy, JPs x 2 placed, necrotic fascia debrided, vicryl mesh sutured to fascia circumferentially, xeroform over mesh then vac dressing placed      SUBJECTIVE:  Flatus: [ ] YES [ ] NO             Bowel Movement: [ ] YES [ ] NO  Pain (0-10):            Pain Control Adequate: [ ] YES [ ] NO  Nausea: [ ] YES [ ] NO            Vomiting: [ ] YES [ ] NO  Diarrhea: [ ] YES [ ] NO         Constipation: [ ] YES [ ] NO     Chest Pain: [ ] YES [ ] NO    SOB:  [ ] YES [ ] NO    MEDICATIONS  (STANDING):  calcitriol Injectable 1 MICROGram(s) IV Push <User Schedule>  collagenase Ointment 1 Application(s) Topical daily  cyanocobalamin Injectable 1000 MICROGram(s) SubCutaneous every 7 days  dextrose 5%. 1000 milliLiter(s) (50 mL/Hr) IV Continuous <Continuous>  dextrose 50% Injectable 25 Gram(s) IV Push once  dextrose 50% Injectable 12.5 Gram(s) IV Push once  diazepam    Tablet 5 milliGRAM(s) Oral at bedtime  diphenhydrAMINE   Injectable 50 milliGRAM(s) IV Push at bedtime  enoxaparin Injectable 30 milliGRAM(s) SubCutaneous two times a day  escitalopram 20 milliGRAM(s) Oral daily  heparin  flush 100 Units/mL Injectable 100 Unit(s) IV Push every other day  insulin lispro (HumaLOG) corrective regimen sliding scale   SubCutaneous every 6 hours  losartan 50 milliGRAM(s) Oral daily  nystatin Powder 1 Application(s) Topical two times a day  octreotide  Injectable 450 MICROGram(s) IV Push daily  oxybutynin 10 milliGRAM(s) Oral two times a day  pantoprazole  Injectable 40 milliGRAM(s) IV Push daily  Parenteral Nutrition - Adult 1 Each (73 mL/Hr) TPN Continuous <Continuous>  sodium chloride 0.9% lock flush 20 milliLiter(s) IV Push once    MEDICATIONS  (PRN):  acetaminophen   Tablet. 325 milliGRAM(s) Oral every 4 hours PRN Moderate Pain (4 - 6)  ondansetron Injectable 4 milliGRAM(s) IV Push every 6 hours PRN Nausea and/or Vomiting  sodium chloride 0.9% lock flush 10 milliLiter(s) IV Push every 1 hour PRN After each medication administration  sodium chloride 0.9% lock flush 10 milliLiter(s) IV Push every 12 hours PRN Lumen of catheter NOT used      Vital Signs Last 24 Hrs  T(C): 37.1 (10 Nov 2017 16:22), Max: 37.6 (10 Nov 2017 05:53)  T(F): 98.8 (10 Nov 2017 16:22), Max: 99.7 (10 Nov 2017 05:53)  HR: 74 (10 Nov 2017 16:22) (63 - 74)  BP: 144/84 (10 Nov 2017 16:22) (138/87 - 154/87)  BP(mean): --  RR: 16 (10 Nov 2017 16:22) (16 - 17)  SpO2: 95% (10 Nov 2017 16:22) (95% - 96%)    PHYSICAL EXAM:      Constitutional: A&Ox3    Breasts:    Respiratory: non labored breathing, no respiratory distress    Cardiovascular: NSR, RRR    Gastrointestinal:                 Incision:    Genitourinary:    Extremities: (-) edema                  I&O's Detail    09 Nov 2017 07:01  -  10 Nov 2017 07:00  --------------------------------------------------------  IN:    TPN (Total Parenteral Nutrition): 1606 mL  Total IN: 1606 mL    OUT:  Total OUT: 0 mL    Total NET: 1606 mL      10 Nov 2017 07:01  -  11 Nov 2017 01:18  --------------------------------------------------------  IN:    TPN (Total Parenteral Nutrition): 876 mL  Total IN: 876 mL    OUT:  Total OUT: 0 mL    Total NET: 876 mL          LABS:                        10.5   6.4   )-----------( 303      ( 10 Nov 2017 07:07 )             34.9     11-10    141  |  102  |  39<H>  ----------------------------<  86  4.5   |  27  |  0.55    Ca    10.1      10 Nov 2017 07:07  Phos  3.8     11-10  Mg     2.2     11-10    TPro  x   /  Alb  3.2<L>  /  TBili  x   /  DBili  x   /  AST  x   /  ALT  x   /  AlkPhos  x   11-10          RADIOLOGY & ADDITIONAL STUDIES: INTERVAL HPI/OVERNIGHT EVENTS: No acute events, wound culture presumptive positive for MRSA        STATUS POST:  7/24: SBR resection, fistula resection, revision of esophagogegunostomy drain through esophageal hiatus in R mediastinum  7/29: Ex-lap, SB anastamosis in BP limb breakdown with succus throughout abdomen  8/1: abd washout, drainage of abscess, biologic mesh placement, closure of abdominal wall, vac and retention suture  8/7: abd washout, mesh removal, frozen abd noted, LLQ CHECO replaced with benson drain  8/10: leak noted from previous anastamosis site on L side, mid abdomen malecot drain placed in enterotomy, JPs x 2 placed, necrotic fascia debrided, vicryl mesh sutured to fascia circumferentially, xeroform over mesh then vac dressing placed    Vital Signs Last 24 Hrs  T(C): 36.6 (11 Nov 2017 05:03), Max: 37.1 (10 Nov 2017 16:22)  T(F): 97.9 (11 Nov 2017 05:03), Max: 98.8 (10 Nov 2017 16:22)  HR: 74 (11 Nov 2017 05:03) (63 - 74)  BP: 144/86 (11 Nov 2017 05:03) (138/87 - 154/87)  BP(mean): --  RR: 16 (11 Nov 2017 05:03) (16 - 16)  SpO2: 97% (11 Nov 2017 05:03) (95% - 97%)    I&O's Summary    10 Nov 2017 07:01  -  11 Nov 2017 07:00  --------------------------------------------------------  IN: 1543.2 mL / OUT: 0 mL / NET: 1543.2 mL      Gen: NAD  Abd: soft, NT/ND fistula output +, greenish and yellowish fluid, wound [id number], 4 fistulas

## 2017-11-12 LAB
GLUCOSE BLDC GLUCOMTR-MCNC: 130 MG/DL — HIGH (ref 70–99)
GLUCOSE BLDC GLUCOMTR-MCNC: 135 MG/DL — HIGH (ref 70–99)
GLUCOSE BLDC GLUCOMTR-MCNC: 78 MG/DL — SIGNIFICANT CHANGE UP (ref 70–99)

## 2017-11-12 RX ORDER — ELECTROLYTE SOLUTION,INJ
1 VIAL (ML) INTRAVENOUS
Qty: 0 | Refills: 0 | Status: DISCONTINUED | OUTPATIENT
Start: 2017-11-12 | End: 2017-11-12

## 2017-11-12 RX ORDER — I.V. FAT EMULSION 20 G/100ML
50 EMULSION INTRAVENOUS ONCE
Qty: 0 | Refills: 0 | Status: COMPLETED | OUTPATIENT
Start: 2017-11-12 | End: 2017-11-12

## 2017-11-12 RX ADMIN — ONDANSETRON 4 MILLIGRAM(S): 8 TABLET, FILM COATED ORAL at 10:42

## 2017-11-12 RX ADMIN — NYSTATIN CREAM 1 APPLICATION(S): 100000 CREAM TOPICAL at 06:38

## 2017-11-12 RX ADMIN — ONDANSETRON 4 MILLIGRAM(S): 8 TABLET, FILM COATED ORAL at 17:23

## 2017-11-12 RX ADMIN — PANTOPRAZOLE SODIUM 40 MILLIGRAM(S): 20 TABLET, DELAYED RELEASE ORAL at 06:39

## 2017-11-12 RX ADMIN — ENOXAPARIN SODIUM 30 MILLIGRAM(S): 100 INJECTION SUBCUTANEOUS at 17:23

## 2017-11-12 RX ADMIN — Medication 10 MILLIGRAM(S): at 06:39

## 2017-11-12 RX ADMIN — Medication 325 MILLIGRAM(S): at 22:00

## 2017-11-12 RX ADMIN — Medication 50 MILLIGRAM(S): at 22:29

## 2017-11-12 RX ADMIN — Medication 325 MILLIGRAM(S): at 16:42

## 2017-11-12 RX ADMIN — NYSTATIN CREAM 1 APPLICATION(S): 100000 CREAM TOPICAL at 21:30

## 2017-11-12 RX ADMIN — Medication 5 MILLIGRAM(S): at 21:29

## 2017-11-12 RX ADMIN — Medication 325 MILLIGRAM(S): at 00:23

## 2017-11-12 RX ADMIN — Medication 325 MILLIGRAM(S): at 10:42

## 2017-11-12 RX ADMIN — OCTREOTIDE ACETATE 450 MICROGRAM(S): 200 INJECTION, SOLUTION INTRAVENOUS; SUBCUTANEOUS at 12:58

## 2017-11-12 RX ADMIN — ESCITALOPRAM OXALATE 20 MILLIGRAM(S): 10 TABLET, FILM COATED ORAL at 21:29

## 2017-11-12 RX ADMIN — Medication 10 MILLIGRAM(S): at 17:22

## 2017-11-12 RX ADMIN — Medication 325 MILLIGRAM(S): at 00:45

## 2017-11-12 RX ADMIN — LOSARTAN POTASSIUM 50 MILLIGRAM(S): 100 TABLET, FILM COATED ORAL at 06:39

## 2017-11-12 RX ADMIN — Medication 1 EACH: at 18:24

## 2017-11-12 RX ADMIN — Medication 325 MILLIGRAM(S): at 11:42

## 2017-11-12 RX ADMIN — Medication 100 UNIT(S): at 12:53

## 2017-11-12 RX ADMIN — Medication 325 MILLIGRAM(S): at 17:42

## 2017-11-12 RX ADMIN — Medication 1 APPLICATION(S): at 06:38

## 2017-11-12 RX ADMIN — I.V. FAT EMULSION 20.83 GRAM(S): 20 EMULSION INTRAVENOUS at 22:45

## 2017-11-12 RX ADMIN — ENOXAPARIN SODIUM 30 MILLIGRAM(S): 100 INJECTION SUBCUTANEOUS at 06:39

## 2017-11-12 RX ADMIN — Medication 325 MILLIGRAM(S): at 07:10

## 2017-11-12 RX ADMIN — Medication 325 MILLIGRAM(S): at 21:29

## 2017-11-12 RX ADMIN — Medication 325 MILLIGRAM(S): at 06:40

## 2017-11-12 NOTE — PROGRESS NOTE ADULT - ASSESSMENT
57 F s/p  SBR, fistula resection, esophagojejunostomy revision w/ post-op course complicated by RTOR for distal anastomosis breakdown, ATN, acute respiratory failure, & septic shock, MRSA bacteremia which resolved. Currently for wound care management.     NPO with sips and chips /TPN/IVF   Pain/nausea control  ISS/OOB with PT daily  Nystatin powder, vancomycin 750 Q12 (10/11-11/8)  SCDs, lovenox, OOBA  AM labs, weekly albumin, pre-albumin and triglyceride (every friday)  Wet to dry dressing in place  NGT in fistula for tube feeds  f/u wound cultures

## 2017-11-12 NOTE — PROGRESS NOTE ADULT - SUBJECTIVE AND OBJECTIVE BOX
STATUS POST:  7/24: SBR resection, fistula resection, revision of esophagogegunostomy drain through esophageal hiatus in R mediastinum  7/29: Ex-lap, SB anastamosis in BP limb breakdown with succus throughout abdomen  8/1: abd washout, drainage of abscess, biologic mesh placement, closure of abdominal wall, vac and retention suture  8/7: abd washout, mesh removal, frozen abd noted, LLQ CHECO replaced with benson drain  8/10: leak noted from previous anastamosis site on L side, mid abdomen malecot drain placed in enterotomy, JPs x 2 placed, necrotic fascia debrided, vicryl mesh sutured to fascia circumferentially, xeroform over mesh then vac dressing placed    Vital Signs Last 24 Hrs  T(C): 36.2 (11 Nov 2017 22:00), Max: 36.2 (11 Nov 2017 14:30)  T(F): 97.1 (11 Nov 2017 22:00), Max: 97.1 (11 Nov 2017 14:30)  HR: 71 (11 Nov 2017 22:00) (69 - 79)  BP: 130/84 (11 Nov 2017 22:00) (118/78 - 130/84)  BP(mean): --  RR: 16 (11 Nov 2017 22:00) (16 - 17)  SpO2: 95% (11 Nov 2017 22:00) (93% - 95%)    I&O's Summary    11 Nov 2017 07:01  -  12 Nov 2017 07:00  --------------------------------------------------------  IN: 1666 mL / OUT: 0 mL / NET: 1666 mL        SUBJECTIVE: Pt seen and examined at bedside.     Pain: [ ] YES [x ] NO  Pain (0-10):              Pain Control Adequate: [ ] YES [ ] NO  SOB: [ ]YES [x ] NO  Chest Discomfort: [ ] YES [x ] NO    Nausea: [ ] YES [x ] NO           Vomiting: [ ] YES [x ] NO  Void: [x ]YES [ ]No    Physical Exam:  General Appearance: Appears well, NAD  Neck: Supple  Chest: Equal expansion bilaterally  CV: Pulse regular presently  Abdomen: Soft, nontense,9cm x9cm wound with 4 fistulas  Extremities: Grossly symmetric, SCD's in place     LABS:                        10.4   5.5   )-----------( 307      ( 11 Nov 2017 07:42 )             34.7     11-11    144  |  102  |  37<H>  ----------------------------<  124<H>  4.5   |  29  |  0.57    Ca    10.0      11 Nov 2017 07:42  Phos  3.4     11-11  Mg     2.2     11-11    TPro  7.0  /  Alb  3.3  /  TBili  0.6  /  DBili  x   /  AST  29  /  ALT  41  /  AlkPhos  296<H>  11-11          RADIOLOGY & ADDITIONAL STUDIES:

## 2017-11-13 LAB
ANION GAP SERPL CALC-SCNC: 15 MMOL/L — SIGNIFICANT CHANGE UP (ref 5–17)
BUN SERPL-MCNC: 37 MG/DL — HIGH (ref 7–23)
CALCIUM SERPL-MCNC: 9.8 MG/DL — SIGNIFICANT CHANGE UP (ref 8.4–10.5)
CHLORIDE SERPL-SCNC: 99 MMOL/L — SIGNIFICANT CHANGE UP (ref 96–108)
CO2 SERPL-SCNC: 26 MMOL/L — SIGNIFICANT CHANGE UP (ref 22–31)
CREAT SERPL-MCNC: 0.58 MG/DL — SIGNIFICANT CHANGE UP (ref 0.5–1.3)
GLUCOSE BLDC GLUCOMTR-MCNC: 106 MG/DL — HIGH (ref 70–99)
GLUCOSE BLDC GLUCOMTR-MCNC: 130 MG/DL — HIGH (ref 70–99)
GLUCOSE BLDC GLUCOMTR-MCNC: 147 MG/DL — HIGH (ref 70–99)
GLUCOSE BLDC GLUCOMTR-MCNC: 94 MG/DL — SIGNIFICANT CHANGE UP (ref 70–99)
GLUCOSE SERPL-MCNC: 136 MG/DL — HIGH (ref 70–99)
HCT VFR BLD CALC: 33.9 % — LOW (ref 34.5–45)
HGB BLD-MCNC: 10.5 G/DL — LOW (ref 11.5–15.5)
MAGNESIUM SERPL-MCNC: 2 MG/DL — SIGNIFICANT CHANGE UP (ref 1.6–2.6)
MCHC RBC-ENTMCNC: 28.5 PG — SIGNIFICANT CHANGE UP (ref 27–34)
MCHC RBC-ENTMCNC: 31 G/DL — LOW (ref 32–36)
MCV RBC AUTO: 92.1 FL — SIGNIFICANT CHANGE UP (ref 80–100)
PHOSPHATE SERPL-MCNC: 3.3 MG/DL — SIGNIFICANT CHANGE UP (ref 2.5–4.5)
PLATELET # BLD AUTO: 278 K/UL — SIGNIFICANT CHANGE UP (ref 150–400)
POTASSIUM SERPL-MCNC: 4 MMOL/L — SIGNIFICANT CHANGE UP (ref 3.5–5.3)
POTASSIUM SERPL-SCNC: 4 MMOL/L — SIGNIFICANT CHANGE UP (ref 3.5–5.3)
RBC # BLD: 3.68 M/UL — LOW (ref 3.8–5.2)
RBC # FLD: 16.3 % — SIGNIFICANT CHANGE UP (ref 10.3–16.9)
SODIUM SERPL-SCNC: 140 MMOL/L — SIGNIFICANT CHANGE UP (ref 135–145)
WBC # BLD: 6 K/UL — SIGNIFICANT CHANGE UP (ref 3.8–10.5)
WBC # FLD AUTO: 6 K/UL — SIGNIFICANT CHANGE UP (ref 3.8–10.5)

## 2017-11-13 PROCEDURE — 99233 SBSQ HOSP IP/OBS HIGH 50: CPT

## 2017-11-13 RX ORDER — HYDROMORPHONE HYDROCHLORIDE 2 MG/ML
0.5 INJECTION INTRAMUSCULAR; INTRAVENOUS; SUBCUTANEOUS ONCE
Qty: 0 | Refills: 0 | Status: DISCONTINUED | OUTPATIENT
Start: 2017-11-13 | End: 2017-11-13

## 2017-11-13 RX ORDER — SODIUM HYPOCHLORITE 0.125 %
1 SOLUTION, NON-ORAL MISCELLANEOUS DAILY
Qty: 0 | Refills: 0 | Status: DISCONTINUED | OUTPATIENT
Start: 2017-11-13 | End: 2017-12-04

## 2017-11-13 RX ORDER — ELECTROLYTE SOLUTION,INJ
1 VIAL (ML) INTRAVENOUS
Qty: 0 | Refills: 0 | Status: DISCONTINUED | OUTPATIENT
Start: 2017-11-13 | End: 2017-11-13

## 2017-11-13 RX ADMIN — ENOXAPARIN SODIUM 30 MILLIGRAM(S): 100 INJECTION SUBCUTANEOUS at 06:32

## 2017-11-13 RX ADMIN — Medication 325 MILLIGRAM(S): at 07:05

## 2017-11-13 RX ADMIN — ONDANSETRON 4 MILLIGRAM(S): 8 TABLET, FILM COATED ORAL at 14:19

## 2017-11-13 RX ADMIN — NYSTATIN CREAM 1 APPLICATION(S): 100000 CREAM TOPICAL at 18:06

## 2017-11-13 RX ADMIN — ESCITALOPRAM OXALATE 20 MILLIGRAM(S): 10 TABLET, FILM COATED ORAL at 22:16

## 2017-11-13 RX ADMIN — Medication 325 MILLIGRAM(S): at 12:30

## 2017-11-13 RX ADMIN — Medication 1 APPLICATION(S): at 06:33

## 2017-11-13 RX ADMIN — Medication 325 MILLIGRAM(S): at 22:50

## 2017-11-13 RX ADMIN — CALCITRIOL 1 MICROGRAM(S): 0.5 CAPSULE ORAL at 13:28

## 2017-11-13 RX ADMIN — HYDROMORPHONE HYDROCHLORIDE 0.5 MILLIGRAM(S): 2 INJECTION INTRAMUSCULAR; INTRAVENOUS; SUBCUTANEOUS at 16:23

## 2017-11-13 RX ADMIN — Medication 10 MILLIGRAM(S): at 06:33

## 2017-11-13 RX ADMIN — PANTOPRAZOLE SODIUM 40 MILLIGRAM(S): 20 TABLET, DELAYED RELEASE ORAL at 06:32

## 2017-11-13 RX ADMIN — Medication 50 MILLIGRAM(S): at 22:16

## 2017-11-13 RX ADMIN — OCTREOTIDE ACETATE 450 MICROGRAM(S): 200 INJECTION, SOLUTION INTRAVENOUS; SUBCUTANEOUS at 13:29

## 2017-11-13 RX ADMIN — LOSARTAN POTASSIUM 50 MILLIGRAM(S): 100 TABLET, FILM COATED ORAL at 06:34

## 2017-11-13 RX ADMIN — ONDANSETRON 4 MILLIGRAM(S): 8 TABLET, FILM COATED ORAL at 22:16

## 2017-11-13 RX ADMIN — ENOXAPARIN SODIUM 30 MILLIGRAM(S): 100 INJECTION SUBCUTANEOUS at 18:06

## 2017-11-13 RX ADMIN — Medication 1 EACH: at 18:06

## 2017-11-13 RX ADMIN — Medication 325 MILLIGRAM(S): at 06:32

## 2017-11-13 RX ADMIN — ONDANSETRON 4 MILLIGRAM(S): 8 TABLET, FILM COATED ORAL at 03:02

## 2017-11-13 RX ADMIN — Medication 1 APPLICATION(S): at 13:28

## 2017-11-13 RX ADMIN — Medication 5 MILLIGRAM(S): at 22:16

## 2017-11-13 RX ADMIN — Medication 325 MILLIGRAM(S): at 11:39

## 2017-11-13 RX ADMIN — Medication 325 MILLIGRAM(S): at 22:16

## 2017-11-13 RX ADMIN — Medication 10 MILLIGRAM(S): at 18:06

## 2017-11-13 RX ADMIN — NYSTATIN CREAM 1 APPLICATION(S): 100000 CREAM TOPICAL at 06:34

## 2017-11-13 RX ADMIN — HYDROMORPHONE HYDROCHLORIDE 0.5 MILLIGRAM(S): 2 INJECTION INTRAMUSCULAR; INTRAVENOUS; SUBCUTANEOUS at 17:14

## 2017-11-13 NOTE — PROGRESS NOTE ADULT - SUBJECTIVE AND OBJECTIVE BOX
Patient seen with Dr Ruiz and he believes that need to wait longer to have wound close before surgery  the intra luminal vac is working except for a pasty psuedomonas type discharge  will place on a po antibiotic that is not absorbed and use dakins solution  will try to give sustical pudding to see if can get to absorb  will have dr grayson chen to see if can use dermal substitute to cover part of the granulation  issue is the contamination with the pasty psuedomonas  albumin is up to 3.3  continue physical therapy

## 2017-11-13 NOTE — PROGRESS NOTE ADULT - SUBJECTIVE AND OBJECTIVE BOX
O/N: BRAYDEN  11/11 & O/n: BRAYDEN OVERNIGHT EVENTS: BRAYDEN      STATUS POST:    7/24: SBR resection, fistula resection, revision of esophagogegunostomy drain through esophageal hiatus in R mediastinum  7/29: Ex-lap, SB anastamosis in BP limb breakdown with succus throughout abdomen  8/1: abd washout, drainage of abscess, biologic mesh placement, closure of abdominal wall, vac and retention suture  8/7: abd washout, mesh removal, frozen abd noted, LLQ CHECO replaced with benson drain  8/10: leak noted from previous anastamosis site on L side, mid abdomen malecot drain placed in enterotomy, JPs x 2 placed, necrotic fascia debrided, vicryl mesh sutured to fascia circumferentially, xeroform over mesh then vac dressing placed        SUBJECTIVE: Patient examined bedside. Patient denies any complaints.  Flatus: [X] YES [] NO             Bowel Movement: [X ] YES [ ] NO  Pain (0-10):            Pain Control Adequate: [X ] YES [ ] NO  Nausea: [ ] YES [X ] NO            Vomiting: [ ] YES [X ] NO  Diarrhea: [ ] YES [X ] NO         Constipation: [ ] YES [X ] NO     Chest Pain: [ ] YES [X ] NO    SOB:  [ ] YES [X ] NO    enoxaparin Injectable 30 milliGRAM(s) SubCutaneous two times a day  heparin  flush 100 Units/mL Injectable 100 Unit(s) IV Push every other day  losartan 50 milliGRAM(s) Oral daily      Vital Signs Last 24 Hrs  T(C): 37.2 (13 Nov 2017 05:38), Max: 37.2 (13 Nov 2017 05:38)  T(F): 99 (13 Nov 2017 05:38), Max: 99 (13 Nov 2017 05:38)  HR: 74 (13 Nov 2017 05:38) (68 - 76)  BP: 137/86 (13 Nov 2017 05:38) (130/83 - 137/86)  BP(mean): --  RR: 17 (13 Nov 2017 05:38) (16 - 18)  SpO2: 94% (13 Nov 2017 05:38) (93% - 96%)  I&O's Detail    12 Nov 2017 07:01  -  13 Nov 2017 07:00  --------------------------------------------------------  IN:    Fat Emulsion 20%: 124.8 mL    TPN (Total Parenteral Nutrition): 584 mL  Total IN: 708.8 mL    OUT:  Total OUT: 0 mL    Total NET: 708.8 mL          General: NAD, resting comfortably in bed  C/V: NSR  Pulm: Nonlabored breathing, no respiratory distress  Abd: soft, NT/ND.  Extrem: WWP, no edema, SCDs in place  Drains: Bilious fluid         LABS:                        10.5   6.0   )-----------( 278      ( 13 Nov 2017 07:32 )             33.9                 RADIOLOGY & ADDITIONAL STUDIES:

## 2017-11-13 NOTE — PROGRESS NOTE ADULT - ASSESSMENT
60 yo F with very complicated course s/p gastric bypass surgery, h/o multiple MDR infections, enterocutaneous fistulae.  Per primary team, green d/c from fistula is gastric contents with succus.  Wound base looks very clean - will not be able to sterilize her wound as it is in contact with her gut, as well as with the outside environment.  However, I am more concerned by her positive blood cultures.  Though she cleared MRSA, she then had CNS and her line from 10/4 still is in place.  It's possible that her line could have been sterilized of CNS but need to see - and she recently came off vancomycin.  She is afebrile with normal WBC.  Suggest:  - Repeat blood cultures X 2 sets - one line, one peripheral  - Continue local care to wound  Recommendations discussed with primary team.  Will follow with you for now - ID service.

## 2017-11-13 NOTE — CHART NOTE - NSCHARTNOTEFT_GEN_A_CORE
Admitting Diagnosis:   Patient is a 57y old  Female who presents with a chief complaint of enterocutaneous fistula (24 Jul 2017 14:15)      PAST MEDICAL & SURGICAL HISTORY:  Reflux  Obesity  DVT (deep venous thrombosis): 2013 neck  Diverticulitis  Hypertension  Elective surgery: abodominal wall surgery  dec 2016  Elective surgery: NOV 2016 gastric bypass revision  Gastric bypass status for obesity: gastric sleeve, 6/2012  S/P breast biopsy: 2011, left  S/P colon resection: 2011  S/P knee surgery: repair 2009, 2011  right; left  Umbilical hernia: 2000  S/P appendectomy: 1975  S/P tonsillectomy: 1967      Current Nutrition Order:   >> NPO with Ice Chips/Sips of Water   >> TPN via central venous line:  1752 TV (73ml/hr); 355g D, 140g AA, 50g 20% lipids (2267 kcal, 2.7g/kg IBW pro, GIR 3.30)  Previously receiving. Ordered discontinued 2/2 NG tubes out 11/8.  >> Jevity 1.2 @10ml/hr x24hrs via NGT in fistula (240ml TV, 288kcal, 13g pro, 194ml FW)    PO Intake: Good (%) [   ]  Fair (50-75%) [   ] Poor (<25%) [   ] N/A    GI Issues: Pt reports no GI distress at present.    Pain: Pain controlled.     Skin Integrity: intra luminal vac being taken out, unstageable pressure ulcer to sacral spine    Labs:   11-13    140  |  99  |  37<H>  ----------------------------<  136<H>  4.0   |  26  |  0.58    Ca    9.8      13 Nov 2017 07:32  Phos  3.3     11-13  Mg     2.0     11-13      CAPILLARY BLOOD GLUCOSE  78 (12 Nov 2017 16:52)      POCT Blood Glucose.: 94 mg/dL (13 Nov 2017 11:53)  POCT Blood Glucose.: 130 mg/dL (13 Nov 2017 06:14)  POCT Blood Glucose.: 147 mg/dL (13 Nov 2017 00:06)  POCT Blood Glucose.: 78 mg/dL (12 Nov 2017 16:53)      Medications:  MEDICATIONS  (STANDING):  calcitriol Injectable 1 MICROGram(s) IV Push <User Schedule>  collagenase Ointment 1 Application(s) Topical daily  cyanocobalamin Injectable 1000 MICROGram(s) SubCutaneous every 7 days  Dakins Solution - 1/2 Strength 1 Application(s) Topical daily  dextrose 5%. 1000 milliLiter(s) (50 mL/Hr) IV Continuous <Continuous>  dextrose 50% Injectable 25 Gram(s) IV Push once  dextrose 50% Injectable 12.5 Gram(s) IV Push once  diazepam    Tablet 5 milliGRAM(s) Oral at bedtime  diphenhydrAMINE   Injectable 50 milliGRAM(s) IV Push at bedtime  enoxaparin Injectable 30 milliGRAM(s) SubCutaneous two times a day  escitalopram 20 milliGRAM(s) Oral daily  heparin  flush 100 Units/mL Injectable 100 Unit(s) IV Push every other day  insulin lispro (HumaLOG) corrective regimen sliding scale   SubCutaneous every 6 hours  losartan 50 milliGRAM(s) Oral daily  nystatin Powder 1 Application(s) Topical two times a day  octreotide  Injectable 450 MICROGram(s) IV Push daily  oxybutynin 10 milliGRAM(s) Oral two times a day  pantoprazole  Injectable 40 milliGRAM(s) IV Push daily  Parenteral Nutrition - Adult 1 Each (73 mL/Hr) TPN Continuous <Continuous>  Parenteral Nutrition - Adult 1 Each (73 mL/Hr) TPN Continuous <Continuous>  sodium chloride 0.9% lock flush 20 milliLiter(s) IV Push once    MEDICATIONS  (PRN):  acetaminophen   Tablet. 325 milliGRAM(s) Oral every 4 hours PRN Moderate Pain (4 - 6)  ondansetron Injectable 4 milliGRAM(s) IV Push every 6 hours PRN Nausea and/or Vomiting  sodium chloride 0.9% lock flush 10 milliLiter(s) IV Push every 1 hour PRN After each medication administration  sodium chloride 0.9% lock flush 10 milliLiter(s) IV Push every 12 hours PRN Lumen of catheter NOT used      Weight:  Bed Scale:  161.5lb (11/13)  160.5lb (11/8)   Standing Scale:  164lb (10/17)  219lb (8/1)    Weight Change:   March 2016: 89kg  February 2017: 74kg   July 2017: 76kg  Please continue to obtain wts for trending.      Estimated energy needs:   Estimated energy needs using 52 kg IBW:  increased needs per wound and pressure ulcer healing. needs calculated based on TPN as primary route of nutrition.  30-35 kcal/kg (1861-4926 kcal).   1.8-2 g/kg ( g protein).  30-35 mL/kg (7834-7829 mL fluid).     Subjective:   Pt seen resting in bed. TPN bag infusing at 73ml/hr providing 355g Dex, 140g AA, 50g 20% lipids (2267 kcal, 2.7g/kg IBW pro, GIR 3.30). Enteral feeds have been discontinued 11/8.  No apparent GI distress. Pt to try PO intake w/ Ensure Pudding per team.  Noted: bed scale wt indicates mild weight loss over last few weeks despite receiving 1700-2300calories/day. Will adjust nutrition recs if pre-alb is not WNL and wt continues to trend down. Please obtain recent wt for trending.    Previous Nutrition Diagnosis: Increased nutrient needs RT increased demand for nutrients AEB wound and pressure ulcer healing   Active [  X]  Resolved [   ]    Goal: Pt to meet >75% EER via tolerated route    Recommendations:  1) Continue w/ current TPN order as tolerated and wt is stable and pre-alb is WNL  2) Monitor triglycerides and adjust lipids per MD discretion  3) Please continue to take standing weight for trending purposes  3) PO diet per MD discretion.   4) Monitor tolerance to EnsurePudding (170kcal, 4g pro)  5) Check labs for signs of fat malabsorption, consider ADEK vitamin.    Education: Understands meeting nutrient needs via TPN.    Risk Level: High [ X  ] Moderate [   ] Low [   ].

## 2017-11-13 NOTE — PROVIDER CONTACT NOTE (OTHER) - BACKGROUND
Pt had participated with getting out of bed this morning and had ambulated using walker in the hallway directly in front of her room . it took so much encouragement and pursuation before pt had agreed

## 2017-11-14 LAB
ANION GAP SERPL CALC-SCNC: 10 MMOL/L — SIGNIFICANT CHANGE UP (ref 5–17)
BUN SERPL-MCNC: 38 MG/DL — HIGH (ref 7–23)
CALCIUM SERPL-MCNC: 10 MG/DL — SIGNIFICANT CHANGE UP (ref 8.4–10.5)
CHLORIDE SERPL-SCNC: 102 MMOL/L — SIGNIFICANT CHANGE UP (ref 96–108)
CO2 SERPL-SCNC: 29 MMOL/L — SIGNIFICANT CHANGE UP (ref 22–31)
CREAT SERPL-MCNC: 0.62 MG/DL — SIGNIFICANT CHANGE UP (ref 0.5–1.3)
GLUCOSE BLDC GLUCOMTR-MCNC: 118 MG/DL — HIGH (ref 70–99)
GLUCOSE BLDC GLUCOMTR-MCNC: 122 MG/DL — HIGH (ref 70–99)
GLUCOSE BLDC GLUCOMTR-MCNC: 129 MG/DL — HIGH (ref 70–99)
GLUCOSE BLDC GLUCOMTR-MCNC: 133 MG/DL — HIGH (ref 70–99)
GLUCOSE BLDC GLUCOMTR-MCNC: 137 MG/DL — HIGH (ref 70–99)
GLUCOSE SERPL-MCNC: 135 MG/DL — HIGH (ref 70–99)
HCT VFR BLD CALC: 35.1 % — SIGNIFICANT CHANGE UP (ref 34.5–45)
HGB BLD-MCNC: 10.5 G/DL — LOW (ref 11.5–15.5)
MAGNESIUM SERPL-MCNC: 2.1 MG/DL — SIGNIFICANT CHANGE UP (ref 1.6–2.6)
MCHC RBC-ENTMCNC: 27.7 PG — SIGNIFICANT CHANGE UP (ref 27–34)
MCHC RBC-ENTMCNC: 29.9 G/DL — LOW (ref 32–36)
MCV RBC AUTO: 92.6 FL — SIGNIFICANT CHANGE UP (ref 80–100)
PHOSPHATE SERPL-MCNC: 3.3 MG/DL — SIGNIFICANT CHANGE UP (ref 2.5–4.5)
PLATELET # BLD AUTO: 270 K/UL — SIGNIFICANT CHANGE UP (ref 150–400)
POTASSIUM SERPL-MCNC: 4.5 MMOL/L — SIGNIFICANT CHANGE UP (ref 3.5–5.3)
POTASSIUM SERPL-SCNC: 4.5 MMOL/L — SIGNIFICANT CHANGE UP (ref 3.5–5.3)
RBC # BLD: 3.79 M/UL — LOW (ref 3.8–5.2)
RBC # FLD: 16.6 % — SIGNIFICANT CHANGE UP (ref 10.3–16.9)
SODIUM SERPL-SCNC: 141 MMOL/L — SIGNIFICANT CHANGE UP (ref 135–145)
WBC # BLD: 6.1 K/UL — SIGNIFICANT CHANGE UP (ref 3.8–10.5)
WBC # FLD AUTO: 6.1 K/UL — SIGNIFICANT CHANGE UP (ref 3.8–10.5)

## 2017-11-14 PROCEDURE — 99232 SBSQ HOSP IP/OBS MODERATE 35: CPT | Mod: GC

## 2017-11-14 RX ORDER — ELECTROLYTE SOLUTION,INJ
1 VIAL (ML) INTRAVENOUS
Qty: 0 | Refills: 0 | Status: DISCONTINUED | OUTPATIENT
Start: 2017-11-14 | End: 2017-11-14

## 2017-11-14 RX ORDER — HYDROMORPHONE HYDROCHLORIDE 2 MG/ML
0.5 INJECTION INTRAMUSCULAR; INTRAVENOUS; SUBCUTANEOUS ONCE
Qty: 0 | Refills: 0 | Status: DISCONTINUED | OUTPATIENT
Start: 2017-11-14 | End: 2017-11-14

## 2017-11-14 RX ORDER — I.V. FAT EMULSION 20 G/100ML
50 EMULSION INTRAVENOUS ONCE
Qty: 0 | Refills: 0 | Status: COMPLETED | OUTPATIENT
Start: 2017-11-14 | End: 2017-11-14

## 2017-11-14 RX ADMIN — Medication 325 MILLIGRAM(S): at 22:00

## 2017-11-14 RX ADMIN — NYSTATIN CREAM 1 APPLICATION(S): 100000 CREAM TOPICAL at 17:25

## 2017-11-14 RX ADMIN — Medication 325 MILLIGRAM(S): at 21:27

## 2017-11-14 RX ADMIN — ESCITALOPRAM OXALATE 20 MILLIGRAM(S): 10 TABLET, FILM COATED ORAL at 21:27

## 2017-11-14 RX ADMIN — PANTOPRAZOLE SODIUM 40 MILLIGRAM(S): 20 TABLET, DELAYED RELEASE ORAL at 07:33

## 2017-11-14 RX ADMIN — OCTREOTIDE ACETATE 450 MICROGRAM(S): 200 INJECTION, SOLUTION INTRAVENOUS; SUBCUTANEOUS at 11:48

## 2017-11-14 RX ADMIN — Medication 10 MILLIGRAM(S): at 17:15

## 2017-11-14 RX ADMIN — ENOXAPARIN SODIUM 30 MILLIGRAM(S): 100 INJECTION SUBCUTANEOUS at 07:34

## 2017-11-14 RX ADMIN — HYDROMORPHONE HYDROCHLORIDE 0.5 MILLIGRAM(S): 2 INJECTION INTRAMUSCULAR; INTRAVENOUS; SUBCUTANEOUS at 13:57

## 2017-11-14 RX ADMIN — ONDANSETRON 4 MILLIGRAM(S): 8 TABLET, FILM COATED ORAL at 07:35

## 2017-11-14 RX ADMIN — HYDROMORPHONE HYDROCHLORIDE 0.5 MILLIGRAM(S): 2 INJECTION INTRAMUSCULAR; INTRAVENOUS; SUBCUTANEOUS at 14:10

## 2017-11-14 RX ADMIN — ONDANSETRON 4 MILLIGRAM(S): 8 TABLET, FILM COATED ORAL at 21:50

## 2017-11-14 RX ADMIN — Medication 1 EACH: at 17:15

## 2017-11-14 RX ADMIN — Medication 10 MILLIGRAM(S): at 07:34

## 2017-11-14 RX ADMIN — NYSTATIN CREAM 1 APPLICATION(S): 100000 CREAM TOPICAL at 07:34

## 2017-11-14 RX ADMIN — Medication 50 MILLIGRAM(S): at 21:27

## 2017-11-14 RX ADMIN — I.V. FAT EMULSION 20.83 GRAM(S): 20 EMULSION INTRAVENOUS at 21:32

## 2017-11-14 RX ADMIN — Medication 325 MILLIGRAM(S): at 17:16

## 2017-11-14 RX ADMIN — Medication 325 MILLIGRAM(S): at 18:02

## 2017-11-14 RX ADMIN — Medication 1 APPLICATION(S): at 07:34

## 2017-11-14 RX ADMIN — Medication 325 MILLIGRAM(S): at 07:35

## 2017-11-14 RX ADMIN — Medication 325 MILLIGRAM(S): at 08:00

## 2017-11-14 RX ADMIN — ONDANSETRON 4 MILLIGRAM(S): 8 TABLET, FILM COATED ORAL at 17:22

## 2017-11-14 RX ADMIN — Medication 100 UNIT(S): at 11:48

## 2017-11-14 RX ADMIN — ENOXAPARIN SODIUM 30 MILLIGRAM(S): 100 INJECTION SUBCUTANEOUS at 17:15

## 2017-11-14 RX ADMIN — Medication 5 MILLIGRAM(S): at 21:27

## 2017-11-14 RX ADMIN — LOSARTAN POTASSIUM 50 MILLIGRAM(S): 100 TABLET, FILM COATED ORAL at 07:34

## 2017-11-14 NOTE — PROGRESS NOTE ADULT - ASSESSMENT
58 yo F with very complicated course s/p gastric bypass surgery, h/o multiple MDR infections, enterocutaneous fistulae.  Per primary team, green d/c from fistula is gastric contents with succus. ID reconsulted to evaluate abdominal wound with wound cultures growing numerous E.coli, few proteus, rare MRSA, rare Pseudomonas. Wound does not appear to have active ongoing infection. However, concern for persistent MSSA bacteremia in the setting of unchanged central line.     - Repeat blood cultures X 2 sets - one line, one peripheral  - Continue local care to wound  - would not start treatment for wound results as wound appears to be clean and patient hemodynamically stable. Likely 2/2 colonization vs contaminant from contact with gut + outside environment. 60 yo F with very complicated course s/p gastric bypass surgery, h/o multiple MDR infections, enterocutaneous fistulae.  Per primary team, green d/c from fistula is gastric contents with succus. ID reconsulted to evaluate abdominal wound with wound cultures growing numerous E.coli, few proteus, rare MRSA, rare Pseudomonas. Wound does not appear to have active ongoing infection. However, concern for persistent CNS bacteremia in the setting of unchanged central line.     - Repeat blood cultures X 2 sets - one line, one peripheral  - Continue local care to wound  - would not start treatment for wound results as wound appears to be clean and patient hemodynamically stable. Likely 2/2 colonization vs contaminant from contact with gut + outside environment.

## 2017-11-14 NOTE — PROGRESS NOTE ADULT - SUBJECTIVE AND OBJECTIVE BOX
O/N: VSS. BRAYDEN  11/13: wound dressing  changed  , ID consulted for pseudomonas and + MRSA , BRAYDEN, VSS OVERNIGHT EVENTS:  VSS. BRAYDEN  11/13: wound dressing  changed  , ID consulted for pseudomonas and + MRSA , BRAYDEN, VSS     STATUS POST:    7/24: SBR resection, fistula resection, revision of esophagogegunostomy drain through esophageal hiatus in R mediastinum  7/29: Ex-lap, SB anastamosis in BP limb breakdown with succus throughout abdomen  8/1: abd washout, drainage of abscess, biologic mesh placement, closure of abdominal wall, vac and retention suture  8/7: abd washout, mesh removal, frozen abd noted, LLQ CHECO replaced with benson drain  8/10: leak noted from previous anastamosis site on L side, mid abdomen malecot drain placed in enterotomy, JPs x 2 placed, necrotic fascia debrided, vicryl mesh sutured to fascia circumferentially, xeroform over mesh then vac dressing placed	          SUBJECTIVE: Patient examined bedside , denies any compliants.  Flatus: [] YES [X] NO             Bowel Movement: [ ] YES [X ] NO  Pain (0-10):            Pain Control Adequate: [X ] YES [ ] NO  Nausea: [ ] YES [X ] NO            Vomiting: [ ] YES [X ] NO  Diarrhea: [ ] YES [X ] NO         Constipation: [ ] YES [X ] NO     Chest Pain: [ ] YES [X ] NO    SOB:  [ ] YES [X ] NO    enoxaparin Injectable 30 milliGRAM(s) SubCutaneous two times a day  heparin  flush 100 Units/mL Injectable 100 Unit(s) IV Push every other day  losartan 50 milliGRAM(s) Oral daily      Vital Signs Last 24 Hrs  T(C): 36.9 (14 Nov 2017 05:51), Max: 37.3 (13 Nov 2017 14:28)  T(F): 98.4 (14 Nov 2017 05:51), Max: 99.1 (13 Nov 2017 14:28)  HR: 72 (14 Nov 2017 05:51) (68 - 79)  BP: 129/84 (14 Nov 2017 05:51) (119/81 - 130/85)  BP(mean): --  RR: 17 (14 Nov 2017 05:51) (16 - 17)  SpO2: 94% (14 Nov 2017 05:51) (93% - 97%)  I&O's Detail    13 Nov 2017 07:01  -  14 Nov 2017 07:00  --------------------------------------------------------  IN:    TPN (Total Parenteral Nutrition): 876 mL  Total IN: 876 mL    OUT:    Drain: 50 mL    Voided: 500 mL  Total OUT: 550 mL    Total NET: 326 mL          General: NAD, resting comfortably in bed  C/V: NSR  Pulm: Nonlabored breathing, no respiratory distress  Abd: Wound clean , granulating tissue observed , TTP around wound site.  Extrem: WWP, no edema, SCDs in place        LABS:                        10.5   6.0   )-----------( 278      ( 13 Nov 2017 07:32 )             33.9     11-13    140  |  99  |  37<H>  ----------------------------<  136<H>  4.0   |  26  |  0.58    Ca    9.8      13 Nov 2017 07:32  Phos  3.3     11-13  Mg     2.0     11-13

## 2017-11-14 NOTE — PROGRESS NOTE ADULT - ATTENDING COMMENTS
I have reviewed the medical record, including laboratory and radiographic studies, interviewed and examined the patient and discussed the plan with Dr. Church, the ID Resident.  Agree with above.  Pseudomonas from superficial culture - susceptibility testing has not been completed.  Will examine with primary team to determine area of wound causing concern for infection.  Would evaluate for clearance of CNS from bloodstream.  Will continue to follow with you – ID service.

## 2017-11-14 NOTE — PROGRESS NOTE ADULT - SUBJECTIVE AND OBJECTIVE BOX
INTERVAL HPI/OVERNIGHT EVENTS: BRAYDEN o/n    CONSTITUTIONAL:  Negative fever or chills, feels well, good appetite  EYES:  Negative  blurry vision or double vision  CARDIOVASCULAR:  Negative for chest pain or palpitations  RESPIRATORY:  Negative for cough, wheezing, or SOB   GASTROINTESTINAL:  Negative for nausea, vomiting, diarrhea, constipation, or abdominal pain  GENITOURINARY:  Negative frequency, urgency or dysuria  NEUROLOGIC:  No headache, confusion, dizziness, lightheadedness      ANTIBIOTICS/RELEVANT:    MEDICATIONS  (STANDING):  calcitriol Injectable 1 MICROGram(s) IV Push <User Schedule>  collagenase Ointment 1 Application(s) Topical daily  cyanocobalamin Injectable 1000 MICROGram(s) SubCutaneous every 7 days  Dakins Solution - 1/2 Strength 1 Application(s) Topical daily  dextrose 5%. 1000 milliLiter(s) (50 mL/Hr) IV Continuous <Continuous>  dextrose 50% Injectable 25 Gram(s) IV Push once  dextrose 50% Injectable 12.5 Gram(s) IV Push once  diazepam    Tablet 5 milliGRAM(s) Oral at bedtime  diphenhydrAMINE   Injectable 50 milliGRAM(s) IV Push at bedtime  enoxaparin Injectable 30 milliGRAM(s) SubCutaneous two times a day  escitalopram 20 milliGRAM(s) Oral daily  fat emulsion (Plant Based) 20% IVPB 50 Gram(s) IV Intermittent once  heparin  flush 100 Units/mL Injectable 100 Unit(s) IV Push every other day  insulin lispro (HumaLOG) corrective regimen sliding scale   SubCutaneous every 6 hours  losartan 50 milliGRAM(s) Oral daily  nystatin Powder 1 Application(s) Topical two times a day  octreotide  Injectable 450 MICROGram(s) IV Push daily  oxybutynin 10 milliGRAM(s) Oral two times a day  pantoprazole  Injectable 40 milliGRAM(s) IV Push daily  Parenteral Nutrition - Adult 1 Each (73 mL/Hr) TPN Continuous <Continuous>  Parenteral Nutrition - Adult 1 Each (73 mL/Hr) TPN Continuous <Continuous>  sodium chloride 0.9% lock flush 20 milliLiter(s) IV Push once    MEDICATIONS  (PRN):  acetaminophen   Tablet. 325 milliGRAM(s) Oral every 4 hours PRN Moderate Pain (4 - 6)  ondansetron Injectable 4 milliGRAM(s) IV Push every 6 hours PRN Nausea and/or Vomiting  sodium chloride 0.9% lock flush 10 milliLiter(s) IV Push every 1 hour PRN After each medication administration  sodium chloride 0.9% lock flush 10 milliLiter(s) IV Push every 12 hours PRN Lumen of catheter NOT used        Vital Signs Last 24 Hrs  T(C): 36.8 (14 Nov 2017 16:52), Max: 36.9 (14 Nov 2017 05:51)  T(F): 98.2 (14 Nov 2017 16:52), Max: 98.4 (14 Nov 2017 05:51)  HR: 76 (14 Nov 2017 16:52) (72 - 82)  BP: 122/76 (14 Nov 2017 16:52) (114/77 - 129/84)  BP(mean): --  RR: 16 (14 Nov 2017 16:52) (16 - 17)  SpO2: 95% (14 Nov 2017 16:52) (92% - 95%)    11-13-17 @ 07:01  -  11-14-17 @ 07:00  --------------------------------------------------------  IN: 876 mL / OUT: 550 mL / NET: 326 mL    11-14-17 @ 07:01  -  11-14-17 @ 21:25  --------------------------------------------------------  IN: 803 mL / OUT: 0 mL / NET: 803 mL      PHYSICAL EXAM:  Constitutional: Obese, NAD  Eyes:CL, EOMI  Ear/Nose/Throat: no oral lesion, no sinus tenderness on percussion	  Neck: no JVD, no lymphadenopathy, supple  Respiratory: CTA eileen  Cardiovascular: S1S2 RRR, no murmurs  Gastrointestinal:soft, (+) BS, no HSM  Extremities: no e/e/c  Vascular: DP Pulse: 2+ right normal; left normal      LABS:                        10.5   6.1   )-----------( 270      ( 14 Nov 2017 12:29 )             35.1     11-14    141  |  102  |  38<H>  ----------------------------<  135<H>  4.5   |  29  |  0.62    Ca    10.0      14 Nov 2017 12:29  Phos  3.3     11-14  Mg     2.1     11-14            MICROBIOLOGY:    RADIOLOGY & ADDITIONAL STUDIES:

## 2017-11-14 NOTE — PROGRESS NOTE BEHAVIORAL HEALTH - SUMMARY
57F reported history of anxiety on Lexapro, no prior psych history, PMH gastric bypass surgery with multiple complications, currently admitted for extended period of time due to fistulas, reported SI while frustrated on 10/5 evening, has since consistently denied.  Psychiatry and psychology following 2-3 times weekly for support.  Surgery now postponed.  Patient intermittently cooperative with sessions; seen yesterday by psychology intern and will follow up again this week.

## 2017-11-14 NOTE — PROGRESS NOTE BEHAVIORAL HEALTH - NSBHFUPINTERVALHXFT_PSY_A_CORE
Patient seen at bedside.  Deferred extensive conversation; said she is tired and resting.  Patient continues to be followed for supportive psychotherapy by psychology intern Mark Murdock, who saw her yesterday.  Patient reports she had a good session with him.

## 2017-11-15 LAB
ANION GAP SERPL CALC-SCNC: 11 MMOL/L — SIGNIFICANT CHANGE UP (ref 5–17)
BUN SERPL-MCNC: 38 MG/DL — HIGH (ref 7–23)
CALCIUM SERPL-MCNC: 9.8 MG/DL — SIGNIFICANT CHANGE UP (ref 8.4–10.5)
CHLORIDE SERPL-SCNC: 100 MMOL/L — SIGNIFICANT CHANGE UP (ref 96–108)
CO2 SERPL-SCNC: 26 MMOL/L — SIGNIFICANT CHANGE UP (ref 22–31)
CREAT SERPL-MCNC: 0.58 MG/DL — SIGNIFICANT CHANGE UP (ref 0.5–1.3)
GLUCOSE BLDC GLUCOMTR-MCNC: 113 MG/DL — HIGH (ref 70–99)
GLUCOSE BLDC GLUCOMTR-MCNC: 126 MG/DL — HIGH (ref 70–99)
GLUCOSE BLDC GLUCOMTR-MCNC: 131 MG/DL — HIGH (ref 70–99)
GLUCOSE BLDC GLUCOMTR-MCNC: 96 MG/DL — SIGNIFICANT CHANGE UP (ref 70–99)
GLUCOSE SERPL-MCNC: 120 MG/DL — HIGH (ref 70–99)
HCT VFR BLD CALC: 35.3 % — SIGNIFICANT CHANGE UP (ref 34.5–45)
HGB BLD-MCNC: 10.6 G/DL — LOW (ref 11.5–15.5)
MAGNESIUM SERPL-MCNC: 2.1 MG/DL — SIGNIFICANT CHANGE UP (ref 1.6–2.6)
MCHC RBC-ENTMCNC: 27.8 PG — SIGNIFICANT CHANGE UP (ref 27–34)
MCHC RBC-ENTMCNC: 30 G/DL — LOW (ref 32–36)
MCV RBC AUTO: 92.7 FL — SIGNIFICANT CHANGE UP (ref 80–100)
PHOSPHATE SERPL-MCNC: 3.5 MG/DL — SIGNIFICANT CHANGE UP (ref 2.5–4.5)
PLATELET # BLD AUTO: 267 K/UL — SIGNIFICANT CHANGE UP (ref 150–400)
POTASSIUM SERPL-MCNC: 4.4 MMOL/L — SIGNIFICANT CHANGE UP (ref 3.5–5.3)
POTASSIUM SERPL-SCNC: 4.4 MMOL/L — SIGNIFICANT CHANGE UP (ref 3.5–5.3)
RBC # BLD: 3.81 M/UL — SIGNIFICANT CHANGE UP (ref 3.8–5.2)
RBC # FLD: 16.4 % — SIGNIFICANT CHANGE UP (ref 10.3–16.9)
SODIUM SERPL-SCNC: 137 MMOL/L — SIGNIFICANT CHANGE UP (ref 135–145)
WBC # BLD: 5.5 K/UL — SIGNIFICANT CHANGE UP (ref 3.8–10.5)
WBC # FLD AUTO: 5.5 K/UL — SIGNIFICANT CHANGE UP (ref 3.8–10.5)

## 2017-11-15 RX ORDER — ELECTROLYTE SOLUTION,INJ
1 VIAL (ML) INTRAVENOUS
Qty: 0 | Refills: 0 | Status: DISCONTINUED | OUTPATIENT
Start: 2017-11-15 | End: 2017-11-15

## 2017-11-15 RX ORDER — ACETAMINOPHEN 500 MG
1000 TABLET ORAL ONCE
Qty: 0 | Refills: 0 | Status: COMPLETED | OUTPATIENT
Start: 2017-11-15 | End: 2017-11-16

## 2017-11-15 RX ORDER — KETOROLAC TROMETHAMINE 30 MG/ML
15 SYRINGE (ML) INJECTION ONCE
Qty: 0 | Refills: 0 | Status: DISCONTINUED | OUTPATIENT
Start: 2017-11-15 | End: 2017-11-15

## 2017-11-15 RX ORDER — HYDROMORPHONE HYDROCHLORIDE 2 MG/ML
0.5 INJECTION INTRAMUSCULAR; INTRAVENOUS; SUBCUTANEOUS ONCE
Qty: 0 | Refills: 0 | Status: DISCONTINUED | OUTPATIENT
Start: 2017-11-15 | End: 2017-11-15

## 2017-11-15 RX ADMIN — HYDROMORPHONE HYDROCHLORIDE 0.5 MILLIGRAM(S): 2 INJECTION INTRAMUSCULAR; INTRAVENOUS; SUBCUTANEOUS at 17:29

## 2017-11-15 RX ADMIN — Medication 325 MILLIGRAM(S): at 18:53

## 2017-11-15 RX ADMIN — ENOXAPARIN SODIUM 30 MILLIGRAM(S): 100 INJECTION SUBCUTANEOUS at 07:00

## 2017-11-15 RX ADMIN — HYDROMORPHONE HYDROCHLORIDE 0.5 MILLIGRAM(S): 2 INJECTION INTRAMUSCULAR; INTRAVENOUS; SUBCUTANEOUS at 16:59

## 2017-11-15 RX ADMIN — OCTREOTIDE ACETATE 450 MICROGRAM(S): 200 INJECTION, SOLUTION INTRAVENOUS; SUBCUTANEOUS at 11:20

## 2017-11-15 RX ADMIN — PANTOPRAZOLE SODIUM 40 MILLIGRAM(S): 20 TABLET, DELAYED RELEASE ORAL at 06:59

## 2017-11-15 RX ADMIN — Medication 1 EACH: at 17:01

## 2017-11-15 RX ADMIN — Medication 325 MILLIGRAM(S): at 04:35

## 2017-11-15 RX ADMIN — Medication 15 MILLIGRAM(S): at 09:50

## 2017-11-15 RX ADMIN — Medication 10 MILLIGRAM(S): at 21:19

## 2017-11-15 RX ADMIN — ENOXAPARIN SODIUM 30 MILLIGRAM(S): 100 INJECTION SUBCUTANEOUS at 16:59

## 2017-11-15 RX ADMIN — Medication 325 MILLIGRAM(S): at 19:45

## 2017-11-15 RX ADMIN — ONDANSETRON 4 MILLIGRAM(S): 8 TABLET, FILM COATED ORAL at 11:38

## 2017-11-15 RX ADMIN — Medication 1 APPLICATION(S): at 07:01

## 2017-11-15 RX ADMIN — Medication 10 MILLIGRAM(S): at 07:00

## 2017-11-15 RX ADMIN — NYSTATIN CREAM 1 APPLICATION(S): 100000 CREAM TOPICAL at 07:01

## 2017-11-15 RX ADMIN — PREGABALIN 1000 MICROGRAM(S): 225 CAPSULE ORAL at 11:20

## 2017-11-15 RX ADMIN — ONDANSETRON 4 MILLIGRAM(S): 8 TABLET, FILM COATED ORAL at 04:40

## 2017-11-15 RX ADMIN — ESCITALOPRAM OXALATE 20 MILLIGRAM(S): 10 TABLET, FILM COATED ORAL at 21:19

## 2017-11-15 RX ADMIN — Medication 325 MILLIGRAM(S): at 12:15

## 2017-11-15 RX ADMIN — CALCITRIOL 1 MICROGRAM(S): 0.5 CAPSULE ORAL at 11:20

## 2017-11-15 RX ADMIN — Medication 50 MILLIGRAM(S): at 21:18

## 2017-11-15 RX ADMIN — Medication 15 MILLIGRAM(S): at 09:26

## 2017-11-15 RX ADMIN — LOSARTAN POTASSIUM 50 MILLIGRAM(S): 100 TABLET, FILM COATED ORAL at 06:59

## 2017-11-15 RX ADMIN — Medication 5 MILLIGRAM(S): at 21:18

## 2017-11-15 RX ADMIN — Medication 325 MILLIGRAM(S): at 05:00

## 2017-11-15 RX ADMIN — NYSTATIN CREAM 1 APPLICATION(S): 100000 CREAM TOPICAL at 17:05

## 2017-11-15 RX ADMIN — Medication 325 MILLIGRAM(S): at 11:38

## 2017-11-15 NOTE — PROGRESS NOTE ADULT - SUBJECTIVE AND OBJECTIVE BOX
O/N: VSS.BRAYDEN  11/14: Dressing changed , VSS , BRAYDEN OVERNIGHT EVENTS:  VSS.BRAYDEN  11/14: Dressing changed , VSS , BRAYDEN    STATUS POST:    7/24: SBR resection, fistula resection, revision of esophagogegunostomy drain through esophageal hiatus in R mediastinum  7/29: Ex-lap, SB anastamosis in BP limb breakdown with succus throughout abdomen  8/1: abd washout, drainage of abscess, biologic mesh placement, closure of abdominal wall, vac and retention suture  8/7: abd washout, mesh removal, frozen abd noted, LLQ CHECO replaced with benson drain  8/10: leak noted from previous anastamosis site on L side, mid abdomen malecot drain placed in enterotomy, JPs x 2 placed, necrotic fascia debrided, vicryl mesh sutured to fascia circumferentially, xeroform over mesh then vac dressing placed	        SUBJECTIVE:  Flatus: [X] YES [] NO             Bowel Movement: [X ] YES [ ] NO  Pain (0-10):            Pain Control Adequate: [ X] YES [ ] NO  Nausea: [ ] YES [ X] NO            Vomiting: [ ] YES [X ] NO  Diarrhea: [ ] YES [X ] NO         Constipation: [ ] YES [X ] NO     Chest Pain: [ ] YES [ X] NO    SOB:  [ ] YES [X ] NO    enoxaparin Injectable 30 milliGRAM(s) SubCutaneous two times a day  heparin  flush 100 Units/mL Injectable 100 Unit(s) IV Push every other day  losartan 50 milliGRAM(s) Oral daily      Vital Signs Last 24 Hrs  T(C): 35.7 (15 Nov 2017 05:20), Max: 36.8 (14 Nov 2017 16:52)  T(F): 96.3 (15 Nov 2017 05:20), Max: 98.3 (14 Nov 2017 21:00)  HR: 64 (15 Nov 2017 05:20) (64 - 82)  BP: 154/82 (15 Nov 2017 05:20) (114/77 - 154/82)  BP(mean): --  RR: 17 (15 Nov 2017 05:20) (16 - 18)  SpO2: 95% (15 Nov 2017 05:20) (92% - 95%)  I&O's Detail    14 Nov 2017 07:01  -  15 Nov 2017 07:00  --------------------------------------------------------  IN:    Fat Emulsion 20%: 208 mL    TPN (Total Parenteral Nutrition): 1606 mL  Total IN: 1814 mL    OUT:    Voided: 300 mL  Total OUT: 300 mL    Total NET: 1514 mL          General: NAD, resting comfortably in bed  C/V: NSR  Pulm: Nonlabored breathing, no respiratory distress  Abd: soft, non distended , TTP around wound site , granulating observed.  Extrem: WWP, no edema, SCDs in place        LABS:                        10.6   5.5   )-----------( 267      ( 15 Nov 2017 06:20 )             35.3     11-15    137  |  100  |  38<H>  ----------------------------<  120<H>  4.4   |  26  |  0.58    Ca    9.8      15 Nov 2017 06:20  Phos  3.5     11-15  Mg     2.1     11-15

## 2017-11-15 NOTE — PROGRESS NOTE ADULT - SUBJECTIVE AND OBJECTIVE BOX
On interval followup, the left subclavian venous catheter site is clean, dry, non-inflamed and non-tender.  Afebrile. Notes and cultures are followed.

## 2017-11-16 LAB
-  COAGULASE NEGATIVE STAPHYLOCOCCUS: SIGNIFICANT CHANGE UP
ANION GAP SERPL CALC-SCNC: 13 MMOL/L — SIGNIFICANT CHANGE UP (ref 5–17)
BUN SERPL-MCNC: 38 MG/DL — HIGH (ref 7–23)
CALCIUM SERPL-MCNC: 9.9 MG/DL — SIGNIFICANT CHANGE UP (ref 8.4–10.5)
CHLORIDE SERPL-SCNC: 97 MMOL/L — SIGNIFICANT CHANGE UP (ref 96–108)
CO2 SERPL-SCNC: 25 MMOL/L — SIGNIFICANT CHANGE UP (ref 22–31)
CREAT SERPL-MCNC: 0.62 MG/DL — SIGNIFICANT CHANGE UP (ref 0.5–1.3)
GLUCOSE BLDC GLUCOMTR-MCNC: 119 MG/DL — HIGH (ref 70–99)
GLUCOSE BLDC GLUCOMTR-MCNC: 120 MG/DL — HIGH (ref 70–99)
GLUCOSE BLDC GLUCOMTR-MCNC: 128 MG/DL — HIGH (ref 70–99)
GLUCOSE SERPL-MCNC: 144 MG/DL — HIGH (ref 70–99)
GRAM STN FLD: SIGNIFICANT CHANGE UP
GRAM STN FLD: SIGNIFICANT CHANGE UP
HCT VFR BLD CALC: 36.1 % — SIGNIFICANT CHANGE UP (ref 34.5–45)
HGB BLD-MCNC: 11.4 G/DL — LOW (ref 11.5–15.5)
MAGNESIUM SERPL-MCNC: 2.1 MG/DL — SIGNIFICANT CHANGE UP (ref 1.6–2.6)
MCHC RBC-ENTMCNC: 28.4 PG — SIGNIFICANT CHANGE UP (ref 27–34)
MCHC RBC-ENTMCNC: 31.6 G/DL — LOW (ref 32–36)
MCV RBC AUTO: 90 FL — SIGNIFICANT CHANGE UP (ref 80–100)
METHOD TYPE: SIGNIFICANT CHANGE UP
PHOSPHATE SERPL-MCNC: 3.5 MG/DL — SIGNIFICANT CHANGE UP (ref 2.5–4.5)
PLATELET # BLD AUTO: 298 K/UL — SIGNIFICANT CHANGE UP (ref 150–400)
POTASSIUM SERPL-MCNC: 3.9 MMOL/L — SIGNIFICANT CHANGE UP (ref 3.5–5.3)
POTASSIUM SERPL-SCNC: 3.9 MMOL/L — SIGNIFICANT CHANGE UP (ref 3.5–5.3)
RBC # BLD: 4.01 M/UL — SIGNIFICANT CHANGE UP (ref 3.8–5.2)
RBC # FLD: 16.1 % — SIGNIFICANT CHANGE UP (ref 10.3–16.9)
SODIUM SERPL-SCNC: 135 MMOL/L — SIGNIFICANT CHANGE UP (ref 135–145)
WBC # BLD: 8.1 K/UL — SIGNIFICANT CHANGE UP (ref 3.8–10.5)
WBC # FLD AUTO: 8.1 K/UL — SIGNIFICANT CHANGE UP (ref 3.8–10.5)

## 2017-11-16 PROCEDURE — 99233 SBSQ HOSP IP/OBS HIGH 50: CPT

## 2017-11-16 RX ORDER — ELECTROLYTE SOLUTION,INJ
1 VIAL (ML) INTRAVENOUS
Qty: 0 | Refills: 0 | Status: DISCONTINUED | OUTPATIENT
Start: 2017-11-16 | End: 2017-11-16

## 2017-11-16 RX ORDER — I.V. FAT EMULSION 20 G/100ML
50 EMULSION INTRAVENOUS ONCE
Qty: 0 | Refills: 0 | Status: COMPLETED | OUTPATIENT
Start: 2017-11-16 | End: 2017-11-16

## 2017-11-16 RX ORDER — KETOROLAC TROMETHAMINE 30 MG/ML
15 SYRINGE (ML) INJECTION ONCE
Qty: 0 | Refills: 0 | Status: DISCONTINUED | OUTPATIENT
Start: 2017-11-16 | End: 2017-11-16

## 2017-11-16 RX ORDER — HYDROMORPHONE HYDROCHLORIDE 2 MG/ML
0.25 INJECTION INTRAMUSCULAR; INTRAVENOUS; SUBCUTANEOUS ONCE
Qty: 0 | Refills: 0 | Status: DISCONTINUED | OUTPATIENT
Start: 2017-11-16 | End: 2017-11-16

## 2017-11-16 RX ADMIN — ONDANSETRON 4 MILLIGRAM(S): 8 TABLET, FILM COATED ORAL at 00:55

## 2017-11-16 RX ADMIN — Medication 325 MILLIGRAM(S): at 18:13

## 2017-11-16 RX ADMIN — Medication 1000 MILLIGRAM(S): at 01:15

## 2017-11-16 RX ADMIN — Medication 400 MILLIGRAM(S): at 00:54

## 2017-11-16 RX ADMIN — ESCITALOPRAM OXALATE 20 MILLIGRAM(S): 10 TABLET, FILM COATED ORAL at 21:48

## 2017-11-16 RX ADMIN — Medication 100 UNIT(S): at 11:21

## 2017-11-16 RX ADMIN — Medication 325 MILLIGRAM(S): at 21:48

## 2017-11-16 RX ADMIN — Medication 325 MILLIGRAM(S): at 05:06

## 2017-11-16 RX ADMIN — PANTOPRAZOLE SODIUM 40 MILLIGRAM(S): 20 TABLET, DELAYED RELEASE ORAL at 05:06

## 2017-11-16 RX ADMIN — Medication 15 MILLIGRAM(S): at 08:47

## 2017-11-16 RX ADMIN — Medication 10 MILLIGRAM(S): at 17:11

## 2017-11-16 RX ADMIN — Medication 10 MILLIGRAM(S): at 05:06

## 2017-11-16 RX ADMIN — Medication 325 MILLIGRAM(S): at 11:22

## 2017-11-16 RX ADMIN — NYSTATIN CREAM 1 APPLICATION(S): 100000 CREAM TOPICAL at 17:11

## 2017-11-16 RX ADMIN — LOSARTAN POTASSIUM 50 MILLIGRAM(S): 100 TABLET, FILM COATED ORAL at 05:06

## 2017-11-16 RX ADMIN — ONDANSETRON 4 MILLIGRAM(S): 8 TABLET, FILM COATED ORAL at 11:23

## 2017-11-16 RX ADMIN — Medication 325 MILLIGRAM(S): at 17:13

## 2017-11-16 RX ADMIN — Medication 50 MILLIGRAM(S): at 21:48

## 2017-11-16 RX ADMIN — NYSTATIN CREAM 1 APPLICATION(S): 100000 CREAM TOPICAL at 05:08

## 2017-11-16 RX ADMIN — Medication 1 APPLICATION(S): at 05:11

## 2017-11-16 RX ADMIN — Medication 325 MILLIGRAM(S): at 06:00

## 2017-11-16 RX ADMIN — Medication 325 MILLIGRAM(S): at 22:48

## 2017-11-16 RX ADMIN — Medication 1 EACH: at 17:11

## 2017-11-16 RX ADMIN — HYDROMORPHONE HYDROCHLORIDE 0.25 MILLIGRAM(S): 2 INJECTION INTRAMUSCULAR; INTRAVENOUS; SUBCUTANEOUS at 14:40

## 2017-11-16 RX ADMIN — ENOXAPARIN SODIUM 30 MILLIGRAM(S): 100 INJECTION SUBCUTANEOUS at 05:09

## 2017-11-16 RX ADMIN — HYDROMORPHONE HYDROCHLORIDE 0.25 MILLIGRAM(S): 2 INJECTION INTRAMUSCULAR; INTRAVENOUS; SUBCUTANEOUS at 14:25

## 2017-11-16 RX ADMIN — I.V. FAT EMULSION 20.83 GRAM(S): 20 EMULSION INTRAVENOUS at 21:47

## 2017-11-16 RX ADMIN — Medication 15 MILLIGRAM(S): at 09:00

## 2017-11-16 RX ADMIN — ENOXAPARIN SODIUM 30 MILLIGRAM(S): 100 INJECTION SUBCUTANEOUS at 17:12

## 2017-11-16 RX ADMIN — OCTREOTIDE ACETATE 450 MICROGRAM(S): 200 INJECTION, SOLUTION INTRAVENOUS; SUBCUTANEOUS at 11:21

## 2017-11-16 RX ADMIN — ONDANSETRON 4 MILLIGRAM(S): 8 TABLET, FILM COATED ORAL at 17:31

## 2017-11-16 RX ADMIN — Medication 5 MILLIGRAM(S): at 21:48

## 2017-11-16 RX ADMIN — Medication 325 MILLIGRAM(S): at 12:25

## 2017-11-16 NOTE — PROGRESS NOTE ADULT - SUBJECTIVE AND OBJECTIVE BOX
O/N: BRAYDEN, VSS  11/15: dressing changed , f/u ID recs    STATUS POST:  7/24: SBR resection, fistula resection, revision of esophagogegunostomy drain through esophageal hiatus in R mediastinum  7/29: Ex-lap, SB anastamosis in BP limb breakdown with succus throughout abdomen  8/1: abd washout, drainage of abscess, biologic mesh placement, closure of abdominal wall, vac and retention suture  8/7: abd washout, mesh removal, frozen abd noted, LLQ CHECO replaced with benson drain  8/10: leak noted from previous anastamosis site on L side, mid abdomen malecot drain placed in enterotomy, JPs x 2 placed, necrotic fascia debrided, vicryl mesh sutured to fascia circumferentially, xeroform over mesh then vac dressing placed O/N: BRAYDEN, VSS  11/15: dressing changed , f/u ID recs    STATUS POST:  7/24: SBR resection, fistula resection, revision of esophagogegunostomy drain through esophageal hiatus in R mediastinum  7/29: Ex-lap, SB anastamosis in BP limb breakdown with succus throughout abdomen  8/1: abd washout, drainage of abscess, biologic mesh placement, closure of abdominal wall, vac and retention suture  8/7: abd washout, mesh removal, frozen abd noted, LLQ CHECO replaced with benson drain  8/10: leak noted from previous anastamosis site on L side, mid abdomen malecot drain placed in enterotomy, JPs x 2 placed, necrotic fascia debrided, vicryl mesh sutured to fascia circumferentially, xeroform over mesh then vac dressing placed      SUBJECTIVE: Patient seen and examined bedside by chief resident. No active complains. no fever.    enoxaparin Injectable 30 milliGRAM(s) SubCutaneous two times a day  heparin  flush 100 Units/mL Injectable 100 Unit(s) IV Push every other day  losartan 50 milliGRAM(s) Oral daily      Vital Signs Last 24 Hrs  T(C): 36.9 (16 Nov 2017 05:50), Max: 37.5 (15 Nov 2017 21:00)  T(F): 98.4 (16 Nov 2017 05:50), Max: 99.5 (15 Nov 2017 21:00)  HR: 87 (16 Nov 2017 05:50) (67 - 87)  BP: 122/83 (16 Nov 2017 05:50) (122/83 - 147/81)  BP(mean): --  RR: 16 (16 Nov 2017 05:50) (16 - 17)  SpO2: 93% (16 Nov 2017 05:50) (93% - 94%)  I&O's Detail    15 Nov 2017 07:01  -  16 Nov 2017 07:00  --------------------------------------------------------  IN:    Fat Emulsion 20%: 62.4 mL    TPN (Total Parenteral Nutrition): 1606 mL  Total IN: 1668.4 mL    OUT:  Total OUT: 0 mL    Total NET: 1668.4 mL      16 Nov 2017 07:01  -  16 Nov 2017 08:55  --------------------------------------------------------  IN:    TPN (Total Parenteral Nutrition): 73 mL  Total IN: 73 mL    OUT:  Total OUT: 0 mL    Total NET: 73 mL      General: NAD, resting comfortably in bed  C/V: NSR  Pulm: Nonlabored breathing, no respiratory distress  Abd: soft, wound is [id number], clean, good granulation tissue. low output.  Extrem: WWP, no edema, SCDs in place        LABS:                        10.6   5.5   )-----------( 267      ( 15 Nov 2017 06:20 )             35.3     11-15    137  |  100  |  38<H>  ----------------------------<  120<H>  4.4   |  26  |  0.58    Ca    9.8      15 Nov 2017 06:20  Phos  3.5     11-15  Mg     2.1     11-15            RADIOLOGY & ADDITIONAL STUDIES:

## 2017-11-16 NOTE — PROGRESS NOTE ADULT - ASSESSMENT
Impression:      1. LLQ wound, fistulae: stable with draining sinuses; team is awaiting improvement in anticipation of her being able to tolerate a procedure to attain source control and a successful bed for grafting.      We await the susceptibility testing of her Ps. aerug,    2. coag negative Staph bacteremia - we suspect line infection - we need a peripheral blood culture done under careful aseptic conditions in [place]et to ascertain whether her central line must be removed.

## 2017-11-16 NOTE — CHART NOTE - NSCHARTNOTEFT_GEN_A_CORE
Admitting Diagnosis:   Patient is a 57y old  Female who presents with a chief complaint of enterocutaneous fistula (24 Jul 2017 14:15)      PAST MEDICAL & SURGICAL HISTORY:  Reflux  Obesity  DVT (deep venous thrombosis): 2013 neck  Diverticulitis  Hypertension  Elective surgery: abodominal wall surgery  dec 2016  Elective surgery: NOV 2016 gastric bypass revision  Gastric bypass status for obesity: gastric sleeve, 6/2012  S/P breast biopsy: 2011, left  S/P colon resection: 2011  S/P knee surgery: repair 2009, 2011  right; left  Umbilical hernia: 2000  S/P appendectomy: 1975  S/P tonsillectomy: 1967    Current Nutrition Order:   >> NPO with Ice Chips/Sips of Water   >> TPN via central venous line:  1752 TV (73ml/hr); 355g D, 140g AA, 50g 20% lipids (2267 kcal, 2.7g/kg IBW pro, GIR 3.30)    PO Intake: Good (%) [   ]  Fair (50-75%) [   ] Poor (<25%) [   ] N/A    GI Issues: Pt reports no GI distress at present.    Pain: Pain controlled.     Skin Integrity: intra luminal vac being taken out, unstageable pressure ulcer to sacral spine  Labs:   11-16    135  |  97  |  38<H>  ----------------------------<  144<H>  3.9   |  25  |  0.62    Ca    9.9      16 Nov 2017 11:13  Phos  3.5     11-16  Mg     2.1     11-16      CAPILLARY BLOOD GLUCOSE      POCT Blood Glucose.: 128 mg/dL (16 Nov 2017 12:11)  POCT Blood Glucose.: 119 mg/dL (16 Nov 2017 05:35)  POCT Blood Glucose.: 126 mg/dL (15 Nov 2017 23:51)  POCT Blood Glucose.: 96 mg/dL (15 Nov 2017 17:27)      Medications:  MEDICATIONS  (STANDING):  bismuth subsalicylate Liquid 30 milliLiter(s) Oral daily  calcitriol Injectable 1 MICROGram(s) IV Push <User Schedule>  collagenase Ointment 1 Application(s) Topical daily  cyanocobalamin Injectable 1000 MICROGram(s) SubCutaneous every 7 days  Dakins Solution - 1/2 Strength 1 Application(s) Topical daily  dextrose 5%. 1000 milliLiter(s) (50 mL/Hr) IV Continuous <Continuous>  dextrose 50% Injectable 25 Gram(s) IV Push once  dextrose 50% Injectable 12.5 Gram(s) IV Push once  diazepam    Tablet 5 milliGRAM(s) Oral at bedtime  diphenhydrAMINE   Injectable 50 milliGRAM(s) IV Push at bedtime  enoxaparin Injectable 30 milliGRAM(s) SubCutaneous two times a day  escitalopram 20 milliGRAM(s) Oral daily  fat emulsion (Plant Based) 20% IVPB 50 Gram(s) IV Intermittent once  heparin  flush 100 Units/mL Injectable 100 Unit(s) IV Push every other day  insulin lispro (HumaLOG) corrective regimen sliding scale   SubCutaneous every 6 hours  losartan 50 milliGRAM(s) Oral daily  nystatin Powder 1 Application(s) Topical two times a day  octreotide  Injectable 450 MICROGram(s) IV Push daily  oxybutynin 10 milliGRAM(s) Oral two times a day  pantoprazole  Injectable 40 milliGRAM(s) IV Push daily  Parenteral Nutrition - Adult 1 Each (73 mL/Hr) TPN Continuous <Continuous>  Parenteral Nutrition - Adult 1 Each (73 mL/Hr) TPN Continuous <Continuous>  sodium chloride 0.9% lock flush 20 milliLiter(s) IV Push once    MEDICATIONS  (PRN):  acetaminophen   Tablet. 325 milliGRAM(s) Oral every 4 hours PRN Moderate Pain (4 - 6)  ondansetron Injectable 4 milliGRAM(s) IV Push every 6 hours PRN Nausea and/or Vomiting  sodium chloride 0.9% lock flush 10 milliLiter(s) IV Push every 1 hour PRN After each medication administration  sodium chloride 0.9% lock flush 10 milliLiter(s) IV Push every 12 hours PRN Lumen of catheter NOT used      Weight:  Bed Scale:  161.5lb (11/13)  160.5lb (11/8)   Standing Scale:  167lb (11/13)  164lb (10/17)  219lb (8/1)    Weight Change:   03/2016: 89kg  02/2017: 74kg   07/2017: 76kg  11/2017: 76kg   Wt appears to be stable over last 4 months. Please continue to take wts for trending.      Estimated energy needs:   Estimated energy needs using 52 kg IBW:  increased needs per wound and pressure ulcer healing. needs calculated based on TPN as primary route of nutrition.  30-35 kcal/kg (2591-7305 kcal).   1.8-2 g/kg ( g protein).  30-35 mL/kg (0414-4332 mL fluid).     Subjective:   Pt seen resting in bed. TPN bag infusing at 73ml/hr providing 355g Dex, 140g AA, 50g 20% lipids (2267 kcal, 2.7g/kg IBW pro, GIR 3.30). Enteral feeds have been discontinued 11/8.  Pudding was trailed, did not absorb properly. No apparent GI distress. New standing wt taken 11/13 -indicates wt has been relatively stable over last 4 months of being in-pt. Will adjust nutrition recs if pre-alb is not WNL and/or wt trends down. Please obtain recent wt for trending.    Previous Nutrition Diagnosis: Increased nutrient needs RT increased demand for nutrients AEB wound and pressure ulcer healing   Active [  X]  Resolved [   ]    Goal: Pt to meet >75% EER via tolerated route    Recommendations:  1) Continue w/ current TPN order as tolerated and wt is stable and pre-alb is WNL  2) Monitor triglycerides and adjust lipids per MD discretion  3) Please continue to take standing weight for trending purposes  3) PO diet per MD discretion.   4) Monitor tolerance to EnsurePudding (170kcal, 4g pro)  5) Check labs for signs of fat malabsorption, consider ADEK vitamin.    Education: Understands meeting nutrient needs via TPN.    Risk Level: High [ X  ] Moderate [   ] Low [   ].

## 2017-11-16 NOTE — PROGRESS NOTE BEHAVIORAL HEALTH - NSBHFUPINTERVALHXFT_PSY_A_CORE
Patient seen at bedside with psychology intern Mark Murdock.   present, left when we entered.  Patient reports she is having sandwiches delivered next Tuesday for staff.  Spoke extensively about food.  Disappointed surgery was canceled; unsure of plan.  Resigned to spending holiday in hospital.  Reports motivation to participate in PT because it helps her back pain and she gets dilaudid.  Requests pain management consult.

## 2017-11-16 NOTE — PROGRESS NOTE ADULT - ASSESSMENT
57 F s/p  SBR, fistula resection, esophagojejunostomy revision w/ post-op course complicated by RTOR for distal anastomosis breakdown, ATN, acute respiratory failure, & septic shock, MRSA bacteremia which resolved. Currently for wound care management.   NPO with sips and chips /TPN/IVF with TF @5cc/hr  Pain/nausea control - only benadryl at midnight and dilaudid 0.25mg during PT session   ISS/OOB with PT daily  Nystatin powder, vancomycin 750 Q12 (10/11-11/8)  SCDs, lovenox, OOBA  Lines :  L left subclavian line (10/4-- )  AM labs, weekly albumin, pre-albumin and triglyceride (every friday)  Wet to dry dressing in place  NGT in fistula for tube feeds 57 F s/p  SBR, fistula resection, esophagojejunostomy revision w/ post-op course complicated by RTOR for distal anastomosis breakdown, ATN, acute respiratory failure, & septic shock, MRSA bacteremia which resolved. Currently for wound care management.       NPO with sips and chips /TPN/IVF with TF @5cc/hr  Pain/nausea control - only benadryl at midnight and dilaudid 0.25mg during PT session   ISS/OOB with PT daily  Nystatin powder, vancomycin 750 Q12 (10/11-11/8)  SCDs, lovenox, OOBA  Lines :  L left subclavian line (10/4-- )  AM labs, weekly albumin, pre-albumin and triglyceride (every friday)  Wet to dry dressing in place  NGT in fistula for tube feeds

## 2017-11-16 NOTE — PROGRESS NOTE ADULT - SUBJECTIVE AND OBJECTIVE BOX
INTERVAL HPI/OVERNIGHT EVENTS:    Afebrile.    Hx MRSA bacteremia    most recent central line blood culture again growing coag-neghatove Staph.    We are awaiting a peripheral blood culture.    LLG fistulae growing a number of organisma inclusing Ps. aeruginossa a,d small MRSA        ANTIBIOTICS/RELEVANT:  None    Allergies: sulfa drugs (Unknown)  sulfamethoxazole (Other)    PHYSICAL EXAMINATION:    Vital Signs Last 24 Hrs  T(F): 98.4 (16 Nov 2017 05:50), Max: 99.5 (15 Nov 2017 21:00)  HR: 87 (16 Nov 2017 05:50) (67 - 87)  BP: 122/83 (16 Nov 2017 05:50) (122/83 - 147/81)  RR: 16 (16 Nov 2017 05:50) (16 - 17)  SpO2: 93% (16 Nov 2017 05:50) (93% - 94%)    Iv site clean    LLQ wound with multiple fistulae' the gauze of the most lateral dressing is bile stained    LABS:                        11.4   8.1   )-----------( 298      ( 16 Nov 2017 11:13 )             36.1     11-16    135  |  97  |  38<H>  ----------------------------<  144<H>  3.9   |  25  |  0.62    Ca    9.9      16 Nov 2017 11:13  Phos  3.5     11-16  Mg     2.1     11-16    MICROBIOLOGY: as noted

## 2017-11-16 NOTE — PROGRESS NOTE BEHAVIORAL HEALTH - SUMMARY
57F reported history of anxiety on Lexapro, no prior psych history, PMH gastric bypass surgery with multiple complications, currently admitted for extended period of time due to fistulas, reported SI while frustrated on 10/5 evening, has since consistently denied.  Psychiatry and psychology following 2-3 times weekly for support.  Surgery now postponed.  Patient intermittently cooperative with sessions.

## 2017-11-17 LAB
ALBUMIN SERPL ELPH-MCNC: 2.7 G/DL — LOW (ref 3.3–5)
ANION GAP SERPL CALC-SCNC: 12 MMOL/L — SIGNIFICANT CHANGE UP (ref 5–17)
BUN SERPL-MCNC: 33 MG/DL — HIGH (ref 7–23)
CALCIUM SERPL-MCNC: 9.7 MG/DL — SIGNIFICANT CHANGE UP (ref 8.4–10.5)
CHLORIDE SERPL-SCNC: 99 MMOL/L — SIGNIFICANT CHANGE UP (ref 96–108)
CO2 SERPL-SCNC: 27 MMOL/L — SIGNIFICANT CHANGE UP (ref 22–31)
CREAT SERPL-MCNC: 0.52 MG/DL — SIGNIFICANT CHANGE UP (ref 0.5–1.3)
GLUCOSE BLDC GLUCOMTR-MCNC: 106 MG/DL — HIGH (ref 70–99)
GLUCOSE BLDC GLUCOMTR-MCNC: 131 MG/DL — HIGH (ref 70–99)
GLUCOSE BLDC GLUCOMTR-MCNC: 132 MG/DL — HIGH (ref 70–99)
GLUCOSE BLDC GLUCOMTR-MCNC: 174 MG/DL — HIGH (ref 70–99)
GLUCOSE SERPL-MCNC: 132 MG/DL — HIGH (ref 70–99)
GRAM STN FLD: SIGNIFICANT CHANGE UP
HCT VFR BLD CALC: 34.1 % — LOW (ref 34.5–45)
HGB BLD-MCNC: 10.5 G/DL — LOW (ref 11.5–15.5)
MAGNESIUM SERPL-MCNC: 2 MG/DL — SIGNIFICANT CHANGE UP (ref 1.6–2.6)
MCHC RBC-ENTMCNC: 28.2 PG — SIGNIFICANT CHANGE UP (ref 27–34)
MCHC RBC-ENTMCNC: 30.8 G/DL — LOW (ref 32–36)
MCV RBC AUTO: 91.7 FL — SIGNIFICANT CHANGE UP (ref 80–100)
PHOSPHATE SERPL-MCNC: 3.3 MG/DL — SIGNIFICANT CHANGE UP (ref 2.5–4.5)
PLATELET # BLD AUTO: 250 K/UL — SIGNIFICANT CHANGE UP (ref 150–400)
POTASSIUM SERPL-MCNC: 4.3 MMOL/L — SIGNIFICANT CHANGE UP (ref 3.5–5.3)
POTASSIUM SERPL-SCNC: 4.3 MMOL/L — SIGNIFICANT CHANGE UP (ref 3.5–5.3)
PREALB SERPL-MCNC: 25 MG/DL — SIGNIFICANT CHANGE UP (ref 20–40)
RBC # BLD: 3.72 M/UL — LOW (ref 3.8–5.2)
RBC # FLD: 15.7 % — SIGNIFICANT CHANGE UP (ref 10.3–16.9)
SODIUM SERPL-SCNC: 138 MMOL/L — SIGNIFICANT CHANGE UP (ref 135–145)
WBC # BLD: 5.8 K/UL — SIGNIFICANT CHANGE UP (ref 3.8–10.5)
WBC # FLD AUTO: 5.8 K/UL — SIGNIFICANT CHANGE UP (ref 3.8–10.5)

## 2017-11-17 RX ORDER — ACETAMINOPHEN 500 MG
1000 TABLET ORAL ONCE
Qty: 0 | Refills: 0 | Status: COMPLETED | OUTPATIENT
Start: 2017-11-17 | End: 2017-11-17

## 2017-11-17 RX ORDER — KETOROLAC TROMETHAMINE 30 MG/ML
15 SYRINGE (ML) INJECTION ONCE
Qty: 0 | Refills: 0 | Status: DISCONTINUED | OUTPATIENT
Start: 2017-11-17 | End: 2017-11-17

## 2017-11-17 RX ORDER — ELECTROLYTE SOLUTION,INJ
1 VIAL (ML) INTRAVENOUS
Qty: 0 | Refills: 0 | Status: DISCONTINUED | OUTPATIENT
Start: 2017-11-17 | End: 2017-11-17

## 2017-11-17 RX ADMIN — ENOXAPARIN SODIUM 30 MILLIGRAM(S): 100 INJECTION SUBCUTANEOUS at 17:21

## 2017-11-17 RX ADMIN — NYSTATIN CREAM 1 APPLICATION(S): 100000 CREAM TOPICAL at 17:22

## 2017-11-17 RX ADMIN — Medication 10 MILLIGRAM(S): at 06:00

## 2017-11-17 RX ADMIN — NYSTATIN CREAM 1 APPLICATION(S): 100000 CREAM TOPICAL at 05:59

## 2017-11-17 RX ADMIN — Medication 325 MILLIGRAM(S): at 07:08

## 2017-11-17 RX ADMIN — ONDANSETRON 4 MILLIGRAM(S): 8 TABLET, FILM COATED ORAL at 02:08

## 2017-11-17 RX ADMIN — Medication 15 MILLIGRAM(S): at 11:23

## 2017-11-17 RX ADMIN — Medication 1000 MILLIGRAM(S): at 21:00

## 2017-11-17 RX ADMIN — Medication 1 APPLICATION(S): at 05:59

## 2017-11-17 RX ADMIN — Medication 10 MILLIGRAM(S): at 17:21

## 2017-11-17 RX ADMIN — Medication 325 MILLIGRAM(S): at 18:21

## 2017-11-17 RX ADMIN — ESCITALOPRAM OXALATE 20 MILLIGRAM(S): 10 TABLET, FILM COATED ORAL at 20:56

## 2017-11-17 RX ADMIN — CALCITRIOL 1 MICROGRAM(S): 0.5 CAPSULE ORAL at 11:24

## 2017-11-17 RX ADMIN — Medication 325 MILLIGRAM(S): at 02:08

## 2017-11-17 RX ADMIN — ONDANSETRON 4 MILLIGRAM(S): 8 TABLET, FILM COATED ORAL at 17:21

## 2017-11-17 RX ADMIN — Medication 2: at 00:44

## 2017-11-17 RX ADMIN — Medication 325 MILLIGRAM(S): at 10:13

## 2017-11-17 RX ADMIN — OCTREOTIDE ACETATE 450 MICROGRAM(S): 200 INJECTION, SOLUTION INTRAVENOUS; SUBCUTANEOUS at 11:24

## 2017-11-17 RX ADMIN — Medication 1 EACH: at 17:30

## 2017-11-17 RX ADMIN — Medication 325 MILLIGRAM(S): at 11:13

## 2017-11-17 RX ADMIN — Medication 5 MILLIGRAM(S): at 20:56

## 2017-11-17 RX ADMIN — LOSARTAN POTASSIUM 50 MILLIGRAM(S): 100 TABLET, FILM COATED ORAL at 06:00

## 2017-11-17 RX ADMIN — Medication 400 MILLIGRAM(S): at 20:29

## 2017-11-17 RX ADMIN — Medication 325 MILLIGRAM(S): at 03:08

## 2017-11-17 RX ADMIN — Medication 325 MILLIGRAM(S): at 06:08

## 2017-11-17 RX ADMIN — ENOXAPARIN SODIUM 30 MILLIGRAM(S): 100 INJECTION SUBCUTANEOUS at 06:00

## 2017-11-17 RX ADMIN — PANTOPRAZOLE SODIUM 40 MILLIGRAM(S): 20 TABLET, DELAYED RELEASE ORAL at 06:00

## 2017-11-17 RX ADMIN — Medication 325 MILLIGRAM(S): at 17:21

## 2017-11-17 RX ADMIN — Medication 50 MILLIGRAM(S): at 20:56

## 2017-11-17 NOTE — PROGRESS NOTE ADULT - SUBJECTIVE AND OBJECTIVE BOX
wound besides area of fistula looks better  continue present management  encourage ambulation  check protein levels

## 2017-11-17 NOTE — PROGRESS NOTE ADULT - SUBJECTIVE AND OBJECTIVE BOX
O/N: BRAYDEN, VSS  11/16 : ID recs sterile peripheral line blood culture, not for any systemic Ab yet, pepto bismol ordered. had PT session today. TPN ordered.    STATUS POST:  7/24: SBR resection, fistula resection, revision of esophagogegunostomy drain through esophageal hiatus in R mediastinum  7/29: Ex-lap, SB anastamosis in BP limb breakdown with succus throughout abdomen  8/1: abd washout, drainage of abscess, biologic mesh placement, closure of abdominal wall, vac and retention suture  8/7: abd washout, mesh removal, frozen abd noted, LLQ CHECO replaced with benson drain  8/10: leak noted from previous anastamosis site on L side, mid abdomen malecot drain placed in enterotomy, JPs x 2 placed, necrotic fascia debrided, vicryl mesh sutured to fascia circumferentially, xeroform over mesh then vac dressing placed O/N: BRAYDEN, VSS  11/16 : ID recs sterile peripheral line blood culture, not for any systemic Ab yet, pepto bismol ordered. had PT session today. TPN ordered.    STATUS POST:  7/24: SBR resection, fistula resection, revision of esophagogegunostomy drain through esophageal hiatus in R mediastinum  7/29: Ex-lap, SB anastamosis in BP limb breakdown with succus throughout abdomen  8/1: abd washout, drainage of abscess, biologic mesh placement, closure of abdominal wall, vac and retention suture  8/7: abd washout, mesh removal, frozen abd noted, LLQ CHECO replaced with benson drain  8/10: leak noted from previous anastamosis site on L side, mid abdomen malecot drain placed in enterotomy, JPs x 2 placed, necrotic fascia debrided, vicryl mesh sutured to fascia circumferentially, xeroform over mesh then vac dressing placed    SUBJECTIVE: Patient seen and examined bedside by chief resident.    enoxaparin Injectable 30 milliGRAM(s) SubCutaneous two times a day  heparin  flush 100 Units/mL Injectable 100 Unit(s) IV Push every other day  losartan 50 milliGRAM(s) Oral daily      Vital Signs Last 24 Hrs  T(C): 37 (17 Nov 2017 05:34), Max: 37.3 (16 Nov 2017 14:37)  T(F): 98.6 (17 Nov 2017 05:34), Max: 99.2 (16 Nov 2017 14:37)  HR: 84 (17 Nov 2017 05:34) (70 - 84)  BP: 145/86 (17 Nov 2017 05:34) (120/79 - 145/86)  BP(mean): --  RR: 17 (17 Nov 2017 05:34) (16 - 17)  SpO2: 95% (17 Nov 2017 05:34) (93% - 95%)  I&O's Detail    16 Nov 2017 07:01  -  17 Nov 2017 07:00  --------------------------------------------------------  IN:    Fat Emulsion 20%: 208 mL    TPN (Total Parenteral Nutrition): 949 mL  Total IN: 1157 mL    OUT:    Drain: 25 mL  Total OUT: 25 mL    Total NET: 1132 mL      17 Nov 2017 07:01  -  17 Nov 2017 08:33  --------------------------------------------------------  IN:    Fat Emulsion 20%: 20.8 mL    TPN (Total Parenteral Nutrition): 73 mL  Total IN: 93.8 mL    OUT:  Total OUT: 0 mL    Total NET: 93.8 mL          General: NAD, resting comfortably in bed  C/V: NSR  Pulm: Nonlabored breathing, no respiratory distress  Abd: soft, NT/ND. dressing changed, +foul smelling odor, fistula x4, wound +granulation tissue, [id number]  Extrem: WWP, no edema, SCDs in place        LABS:                        10.5   5.8   )-----------( 250      ( 17 Nov 2017 07:22 )             34.1     11-17    138  |  99  |  33<H>  ----------------------------<  132<H>  4.3   |  27  |  0.52    Ca    9.7      17 Nov 2017 06:59  Phos  3.3     11-17  Mg     2.0     11-17            RADIOLOGY & ADDITIONAL STUDIES:

## 2017-11-17 NOTE — PROGRESS NOTE ADULT - ASSESSMENT
57 F s/p  SBR, fistula resection, esophagojejunostomy revision w/ post-op course complicated by RTOR for distal anastomosis breakdown, ATN, acute respiratory failure, & septic shock, MRSA bacteremia which resolved. Currently for wound care management.   NPO with sips and chips /TPN/IVF with TF @5cc/hr  Pain/nausea control - only benadryl at midnight and dilaudid 0.25mg during PT session   ISS/OOB with PT daily  Nystatin powder, vancomycin 750 Q12 (10/11-11/8)  SCDs, lovenox, OOBA  Lines :  L left subclavian line (10/4-- )  AM labs, weekly albumin, pre-albumin and triglyceride (every friday)  Wet to dry dressing in place  NGT in fistula for tube feeds 57 F s/p  SBR, fistula resection, esophagojejunostomy revision w/ post-op course complicated by RTOR for distal anastomosis breakdown, ATN, acute respiratory failure, & septic shock, MRSA bacteremia which resolved. Currently for wound care management.     NPO with sips and chips /TPN/IVF   Pain/nausea control - only benadryl at midnight and dilaudid 0.25mg during PT session   ISS/OOB with PT daily  Nystatin powder, vancomycin 750 Q12 (10/11-11/8)  SCDs, lovenox, OOBA  Lines :  L left subclavian line (10/4-- )  AM labs, weekly albumin, pre-albumin and triglyceride (every friday)  Wet to dry dressing DAILY  NGT in fistula for tube feeds

## 2017-11-18 LAB
-  AMPICILLIN/SULBACTAM: SIGNIFICANT CHANGE UP
-  AMPICILLIN/SULBACTAM: SIGNIFICANT CHANGE UP
-  AMPICILLIN: SIGNIFICANT CHANGE UP
-  AMPICILLIN: SIGNIFICANT CHANGE UP
-  CEFAZOLIN: SIGNIFICANT CHANGE UP
-  CEFTRIAXONE: SIGNIFICANT CHANGE UP
-  CEFTRIAXONE: SIGNIFICANT CHANGE UP
-  CIPROFLOXACIN: SIGNIFICANT CHANGE UP
-  CIPROFLOXACIN: SIGNIFICANT CHANGE UP
-  CLINDAMYCIN: SIGNIFICANT CHANGE UP
-  ERYTHROMYCIN: SIGNIFICANT CHANGE UP
-  GENTAMICIN: SIGNIFICANT CHANGE UP
-  GENTAMICIN: SIGNIFICANT CHANGE UP
-  LINEZOLID: SIGNIFICANT CHANGE UP
-  OXACILLIN: SIGNIFICANT CHANGE UP
-  PENICILLIN: SIGNIFICANT CHANGE UP
-  PIPERACILLIN/TAZOBACTAM: SIGNIFICANT CHANGE UP
-  PIPERACILLIN/TAZOBACTAM: SIGNIFICANT CHANGE UP
-  RIFAMPIN: SIGNIFICANT CHANGE UP
-  TOBRAMYCIN: SIGNIFICANT CHANGE UP
-  TOBRAMYCIN: SIGNIFICANT CHANGE UP
-  TRIMETHOPRIM/SULFAMETHOXAZOLE: SIGNIFICANT CHANGE UP
-  VANCOMYCIN: SIGNIFICANT CHANGE UP
ALBUMIN SERPL ELPH-MCNC: 3.2 G/DL — LOW (ref 3.3–5)
ALP SERPL-CCNC: 285 U/L — HIGH (ref 40–120)
ALT FLD-CCNC: 31 U/L — SIGNIFICANT CHANGE UP (ref 10–45)
ANION GAP SERPL CALC-SCNC: 11 MMOL/L — SIGNIFICANT CHANGE UP (ref 5–17)
AST SERPL-CCNC: 19 U/L — SIGNIFICANT CHANGE UP (ref 10–40)
BILIRUB SERPL-MCNC: 0.9 MG/DL — SIGNIFICANT CHANGE UP (ref 0.2–1.2)
BUN SERPL-MCNC: 32 MG/DL — HIGH (ref 7–23)
CALCIUM SERPL-MCNC: 10 MG/DL — SIGNIFICANT CHANGE UP (ref 8.4–10.5)
CHLORIDE SERPL-SCNC: 98 MMOL/L — SIGNIFICANT CHANGE UP (ref 96–108)
CO2 SERPL-SCNC: 28 MMOL/L — SIGNIFICANT CHANGE UP (ref 22–31)
CREAT SERPL-MCNC: 0.56 MG/DL — SIGNIFICANT CHANGE UP (ref 0.5–1.3)
CULTURE RESULTS: SIGNIFICANT CHANGE UP
GLUCOSE BLDC GLUCOMTR-MCNC: 109 MG/DL — HIGH (ref 70–99)
GLUCOSE BLDC GLUCOMTR-MCNC: 127 MG/DL — HIGH (ref 70–99)
GLUCOSE BLDC GLUCOMTR-MCNC: 136 MG/DL — HIGH (ref 70–99)
GLUCOSE BLDC GLUCOMTR-MCNC: 138 MG/DL — HIGH (ref 70–99)
GLUCOSE SERPL-MCNC: 119 MG/DL — HIGH (ref 70–99)
HCT VFR BLD CALC: 36.1 % — SIGNIFICANT CHANGE UP (ref 34.5–45)
HGB BLD-MCNC: 11 G/DL — LOW (ref 11.5–15.5)
MAGNESIUM SERPL-MCNC: 2 MG/DL — SIGNIFICANT CHANGE UP (ref 1.6–2.6)
MCHC RBC-ENTMCNC: 27.6 PG — SIGNIFICANT CHANGE UP (ref 27–34)
MCHC RBC-ENTMCNC: 30.5 G/DL — LOW (ref 32–36)
MCV RBC AUTO: 90.5 FL — SIGNIFICANT CHANGE UP (ref 80–100)
METHOD TYPE: SIGNIFICANT CHANGE UP
ORGANISM # SPEC MICROSCOPIC CNT: SIGNIFICANT CHANGE UP
PHOSPHATE SERPL-MCNC: 3.5 MG/DL — SIGNIFICANT CHANGE UP (ref 2.5–4.5)
PLATELET # BLD AUTO: 275 K/UL — SIGNIFICANT CHANGE UP (ref 150–400)
POTASSIUM SERPL-MCNC: 3.8 MMOL/L — SIGNIFICANT CHANGE UP (ref 3.5–5.3)
POTASSIUM SERPL-SCNC: 3.8 MMOL/L — SIGNIFICANT CHANGE UP (ref 3.5–5.3)
PROT SERPL-MCNC: 7.7 G/DL — SIGNIFICANT CHANGE UP (ref 6–8.3)
RBC # BLD: 3.99 M/UL — SIGNIFICANT CHANGE UP (ref 3.8–5.2)
RBC # FLD: 15.8 % — SIGNIFICANT CHANGE UP (ref 10.3–16.9)
SODIUM SERPL-SCNC: 137 MMOL/L — SIGNIFICANT CHANGE UP (ref 135–145)
SPECIMEN SOURCE: SIGNIFICANT CHANGE UP
TRIGL SERPL-MCNC: 116 MG/DL — SIGNIFICANT CHANGE UP (ref 10–149)
WBC # BLD: 6.9 K/UL — SIGNIFICANT CHANGE UP (ref 3.8–10.5)
WBC # FLD AUTO: 6.9 K/UL — SIGNIFICANT CHANGE UP (ref 3.8–10.5)

## 2017-11-18 RX ORDER — ELECTROLYTE SOLUTION,INJ
1 VIAL (ML) INTRAVENOUS
Qty: 0 | Refills: 0 | Status: DISCONTINUED | OUTPATIENT
Start: 2017-11-18 | End: 2017-11-18

## 2017-11-18 RX ORDER — LIDOCAINE 4 G/100G
1 CREAM TOPICAL DAILY
Qty: 0 | Refills: 0 | Status: DISCONTINUED | OUTPATIENT
Start: 2017-11-18 | End: 2017-12-27

## 2017-11-18 RX ORDER — KETOROLAC TROMETHAMINE 30 MG/ML
15 SYRINGE (ML) INJECTION ONCE
Qty: 0 | Refills: 0 | Status: DISCONTINUED | OUTPATIENT
Start: 2017-11-18 | End: 2017-11-18

## 2017-11-18 RX ORDER — ACETAMINOPHEN 500 MG
1000 TABLET ORAL ONCE
Qty: 0 | Refills: 0 | Status: COMPLETED | OUTPATIENT
Start: 2017-11-18 | End: 2017-11-18

## 2017-11-18 RX ORDER — HYDROMORPHONE HYDROCHLORIDE 2 MG/ML
0.25 INJECTION INTRAMUSCULAR; INTRAVENOUS; SUBCUTANEOUS ONCE
Qty: 0 | Refills: 0 | Status: DISCONTINUED | OUTPATIENT
Start: 2017-11-18 | End: 2017-11-18

## 2017-11-18 RX ADMIN — Medication 325 MILLIGRAM(S): at 21:19

## 2017-11-18 RX ADMIN — Medication 325 MILLIGRAM(S): at 12:54

## 2017-11-18 RX ADMIN — LOSARTAN POTASSIUM 50 MILLIGRAM(S): 100 TABLET, FILM COATED ORAL at 06:48

## 2017-11-18 RX ADMIN — Medication 325 MILLIGRAM(S): at 06:47

## 2017-11-18 RX ADMIN — ONDANSETRON 4 MILLIGRAM(S): 8 TABLET, FILM COATED ORAL at 07:15

## 2017-11-18 RX ADMIN — Medication 325 MILLIGRAM(S): at 00:25

## 2017-11-18 RX ADMIN — Medication 10 MILLIGRAM(S): at 06:47

## 2017-11-18 RX ADMIN — Medication 325 MILLIGRAM(S): at 01:25

## 2017-11-18 RX ADMIN — Medication 5 MILLIGRAM(S): at 21:11

## 2017-11-18 RX ADMIN — LIDOCAINE 1 PATCH: 4 CREAM TOPICAL at 22:00

## 2017-11-18 RX ADMIN — Medication 100 UNIT(S): at 12:17

## 2017-11-18 RX ADMIN — Medication 400 MILLIGRAM(S): at 03:54

## 2017-11-18 RX ADMIN — ESCITALOPRAM OXALATE 20 MILLIGRAM(S): 10 TABLET, FILM COATED ORAL at 21:11

## 2017-11-18 RX ADMIN — Medication 325 MILLIGRAM(S): at 17:15

## 2017-11-18 RX ADMIN — Medication 325 MILLIGRAM(S): at 12:24

## 2017-11-18 RX ADMIN — Medication 325 MILLIGRAM(S): at 16:53

## 2017-11-18 RX ADMIN — NYSTATIN CREAM 1 APPLICATION(S): 100000 CREAM TOPICAL at 19:32

## 2017-11-18 RX ADMIN — Medication 15 MILLIGRAM(S): at 15:55

## 2017-11-18 RX ADMIN — LIDOCAINE 1 PATCH: 4 CREAM TOPICAL at 10:25

## 2017-11-18 RX ADMIN — NYSTATIN CREAM 1 APPLICATION(S): 100000 CREAM TOPICAL at 06:49

## 2017-11-18 RX ADMIN — ONDANSETRON 4 MILLIGRAM(S): 8 TABLET, FILM COATED ORAL at 19:31

## 2017-11-18 RX ADMIN — PANTOPRAZOLE SODIUM 40 MILLIGRAM(S): 20 TABLET, DELAYED RELEASE ORAL at 06:44

## 2017-11-18 RX ADMIN — Medication 1 APPLICATION(S): at 06:50

## 2017-11-18 RX ADMIN — Medication 325 MILLIGRAM(S): at 22:19

## 2017-11-18 RX ADMIN — HYDROMORPHONE HYDROCHLORIDE 0.25 MILLIGRAM(S): 2 INJECTION INTRAMUSCULAR; INTRAVENOUS; SUBCUTANEOUS at 10:25

## 2017-11-18 RX ADMIN — OCTREOTIDE ACETATE 450 MICROGRAM(S): 200 INJECTION, SOLUTION INTRAVENOUS; SUBCUTANEOUS at 12:18

## 2017-11-18 RX ADMIN — Medication 1000 MILLIGRAM(S): at 04:30

## 2017-11-18 RX ADMIN — ENOXAPARIN SODIUM 30 MILLIGRAM(S): 100 INJECTION SUBCUTANEOUS at 19:31

## 2017-11-18 RX ADMIN — ENOXAPARIN SODIUM 30 MILLIGRAM(S): 100 INJECTION SUBCUTANEOUS at 06:45

## 2017-11-18 RX ADMIN — Medication 50 MILLIGRAM(S): at 21:11

## 2017-11-18 RX ADMIN — Medication 1 EACH: at 17:01

## 2017-11-18 RX ADMIN — Medication 10 MILLIGRAM(S): at 19:31

## 2017-11-18 RX ADMIN — Medication 15 MILLIGRAM(S): at 16:25

## 2017-11-18 RX ADMIN — ONDANSETRON 4 MILLIGRAM(S): 8 TABLET, FILM COATED ORAL at 00:40

## 2017-11-18 NOTE — PROGRESS NOTE ADULT - ASSESSMENT
57 F s/p  SBR, fistula resection, esophagojejunostomy revision w/ post-op course complicated by RTOR for distal anastomosis breakdown, ATN, acute respiratory failure, & septic shock, MRSA bacteremia which resolved. Currently for wound care management.   NPO with sips and chips /TPN/IVF with TF @5cc/hr  Pain/nausea control - only benadryl at midnight and dilaudid 0.25mg during PT session   ISS/OOB with PT daily  Nystatin powder, vancomycin 750 Q12 (10/11-11/8)  SCDs, lovenox, OOBA  Lines :  L left subclavian line (10/4-- )  AM labs, weekly albumin, pre-albumin and triglyceride (every friday)  Wet to dry dressing in place  NGT in fistula for tube feeds 57 F s/p  SBR, fistula resection, esophagojejunostomy revision w/ post-op course complicated by RTOR for distal anastomosis breakdown, ATN, acute respiratory failure, & septic shock, MRSA bacteremia which resolved. Currently for wound care management.     NPO with sips and chips /TPN/IVF   Pain/nausea control - only benadryl at midnight and dilaudid 0.25mg during PT session   ISS/OOB with PT daily  Nystatin powder, vancomycin 750 Q12 (10/11-11/8)  SCDs, lovenox, OOBA  Lines :  L left subclavian line (10/4-- )  AM labs, weekly albumin, pre-albumin and triglyceride (every friday)  Wet to dry dressing in place  NGT in fistula to wall suction  Lidocaine patch

## 2017-11-18 NOTE — PROGRESS NOTE ADULT - SUBJECTIVE AND OBJECTIVE BOX
On interval followup, the left subclavian venous catheter site is clean, dry and non-inflamed. T 99 range. The recent blood culture report and the evaluation are followed.

## 2017-11-18 NOTE — PROGRESS NOTE ADULT - SUBJECTIVE AND OBJECTIVE BOX
O/N: BP elevated to 180/96, IV tylenol given, other VSS  11/17 : wound care done, VSS    STATUS POST:  7/24: SBR resection, fistula resection, revision of esophagogegunostomy drain through esophageal hiatus in R mediastinum  7/29: Ex-lap, SB anastamosis in BP limb breakdown with succus throughout abdomen  8/1: abd washout, drainage of abscess, biologic mesh placement, closure of abdominal wall, vac and retention suture  8/7: abd washout, mesh removal, frozen abd noted, LLQ CHECO replaced with benson drain  8/10: leak noted from previous anastamosis site on L side, mid abdomen malecot drain placed in enterotomy, JPs x 2 placed, necrotic fascia debrided, vicryl mesh sutured to fascia circumferentially, xeroform over mesh then vac dressing placed O/N: BP elevated to 180/96, IV tylenol given, other VSS  11/17 : wound care done, VSS    STATUS POST:  7/24: SBR resection, fistula resection, revision of esophagogegunostomy drain through esophageal hiatus in R mediastinum  7/29: Ex-lap, SB anastamosis in BP limb breakdown with succus throughout abdomen  8/1: abd washout, drainage of abscess, biologic mesh placement, closure of abdominal wall, vac and retention suture  8/7: abd washout, mesh removal, frozen abd noted, LLQ CHECO replaced with benson drain  8/10: leak noted from previous anastamosis site on L side, mid abdomen malecot drain placed in enterotomy, JPs x 2 placed, necrotic fascia debrided, vicryl mesh sutured to fascia circumferentially, xeroform over mesh then vac dressing placed    SUBJECTIVE: Patient seen and examined bedside by chief resident. c/o back pain. no other issue    enoxaparin Injectable 30 milliGRAM(s) SubCutaneous two times a day  heparin  flush 100 Units/mL Injectable 100 Unit(s) IV Push every other day  losartan 50 milliGRAM(s) Oral daily      Vital Signs Last 24 Hrs  T(C): 37.1 (18 Nov 2017 07:04), Max: 37.2 (17 Nov 2017 18:01)  T(F): 98.7 (18 Nov 2017 07:04), Max: 99 (17 Nov 2017 18:01)  HR: 61 (18 Nov 2017 07:04) (61 - 80)  BP: 160/81 (18 Nov 2017 07:04) (138/80 - 180/96)  BP(mean): --  RR: 16 (18 Nov 2017 07:04) (16 - 17)  SpO2: 93% (18 Nov 2017 07:04) (93% - 96%)  I&O's Detail    17 Nov 2017 07:01  -  18 Nov 2017 07:00  --------------------------------------------------------  IN:    Fat Emulsion 20%: 62.4 mL    Solution: 200 mL    TPN (Total Parenteral Nutrition): 1752 mL  Total IN: 2014.4 mL    OUT:  Total OUT: 0 mL    Total NET: 2014.4 mL          General: NAD, resting comfortably in bed  C/V: NSR  Pulm: Nonlabored breathing, no respiratory distress  Abd: soft, NT/ND. wound [id number], 4 fistulas, no leak, dressing intact. good granulation tissue  Extrem: WWP, no edema, SCDs in place        LABS:                        10.5   5.8   )-----------( 250      ( 17 Nov 2017 07:22 )             34.1     11-17    138  |  99  |  33<H>  ----------------------------<  132<H>  4.3   |  27  |  0.52    Ca    9.7      17 Nov 2017 06:59  Phos  3.3     11-17  Mg     2.0     11-17    TPro  x   /  Alb  2.7<L>  /  TBili  x   /  DBili  x   /  AST  x   /  ALT  x   /  AlkPhos  x   11-17          RADIOLOGY & ADDITIONAL STUDIES:

## 2017-11-19 LAB
GLUCOSE BLDC GLUCOMTR-MCNC: 106 MG/DL — HIGH (ref 70–99)
GLUCOSE BLDC GLUCOMTR-MCNC: 121 MG/DL — HIGH (ref 70–99)
GLUCOSE BLDC GLUCOMTR-MCNC: 132 MG/DL — HIGH (ref 70–99)
GLUCOSE BLDC GLUCOMTR-MCNC: 137 MG/DL — HIGH (ref 70–99)

## 2017-11-19 RX ORDER — HYDROMORPHONE HYDROCHLORIDE 2 MG/ML
0.5 INJECTION INTRAMUSCULAR; INTRAVENOUS; SUBCUTANEOUS ONCE
Qty: 0 | Refills: 0 | Status: DISCONTINUED | OUTPATIENT
Start: 2017-11-19 | End: 2017-11-19

## 2017-11-19 RX ORDER — DIAZEPAM 5 MG
5 TABLET ORAL AT BEDTIME
Qty: 0 | Refills: 0 | Status: DISCONTINUED | OUTPATIENT
Start: 2017-11-19 | End: 2017-11-24

## 2017-11-19 RX ORDER — KETOROLAC TROMETHAMINE 30 MG/ML
15 SYRINGE (ML) INJECTION ONCE
Qty: 0 | Refills: 0 | Status: DISCONTINUED | OUTPATIENT
Start: 2017-11-19 | End: 2017-11-19

## 2017-11-19 RX ORDER — I.V. FAT EMULSION 20 G/100ML
50 EMULSION INTRAVENOUS ONCE
Qty: 0 | Refills: 0 | Status: COMPLETED | OUTPATIENT
Start: 2017-11-19 | End: 2017-11-19

## 2017-11-19 RX ORDER — ACETAMINOPHEN 500 MG
1000 TABLET ORAL ONCE
Qty: 0 | Refills: 0 | Status: COMPLETED | OUTPATIENT
Start: 2017-11-19 | End: 2017-11-19

## 2017-11-19 RX ORDER — ELECTROLYTE SOLUTION,INJ
1 VIAL (ML) INTRAVENOUS
Qty: 0 | Refills: 0 | Status: DISCONTINUED | OUTPATIENT
Start: 2017-11-19 | End: 2017-11-19

## 2017-11-19 RX ADMIN — ESCITALOPRAM OXALATE 20 MILLIGRAM(S): 10 TABLET, FILM COATED ORAL at 22:32

## 2017-11-19 RX ADMIN — LIDOCAINE 1 PATCH: 4 CREAM TOPICAL at 12:34

## 2017-11-19 RX ADMIN — Medication 325 MILLIGRAM(S): at 01:22

## 2017-11-19 RX ADMIN — LOSARTAN POTASSIUM 50 MILLIGRAM(S): 100 TABLET, FILM COATED ORAL at 06:21

## 2017-11-19 RX ADMIN — HYDROMORPHONE HYDROCHLORIDE 0.5 MILLIGRAM(S): 2 INJECTION INTRAMUSCULAR; INTRAVENOUS; SUBCUTANEOUS at 08:00

## 2017-11-19 RX ADMIN — Medication 1 APPLICATION(S): at 12:35

## 2017-11-19 RX ADMIN — NYSTATIN CREAM 1 APPLICATION(S): 100000 CREAM TOPICAL at 06:20

## 2017-11-19 RX ADMIN — Medication 50 MILLIGRAM(S): at 22:32

## 2017-11-19 RX ADMIN — Medication 1000 MILLIGRAM(S): at 15:30

## 2017-11-19 RX ADMIN — Medication 325 MILLIGRAM(S): at 06:17

## 2017-11-19 RX ADMIN — OCTREOTIDE ACETATE 450 MICROGRAM(S): 200 INJECTION, SOLUTION INTRAVENOUS; SUBCUTANEOUS at 12:34

## 2017-11-19 RX ADMIN — Medication 325 MILLIGRAM(S): at 22:32

## 2017-11-19 RX ADMIN — Medication 15 MILLIGRAM(S): at 22:32

## 2017-11-19 RX ADMIN — Medication 325 MILLIGRAM(S): at 05:17

## 2017-11-19 RX ADMIN — Medication 15 MILLIGRAM(S): at 20:48

## 2017-11-19 RX ADMIN — Medication 1 APPLICATION(S): at 06:20

## 2017-11-19 RX ADMIN — Medication 400 MILLIGRAM(S): at 14:59

## 2017-11-19 RX ADMIN — Medication 325 MILLIGRAM(S): at 02:22

## 2017-11-19 RX ADMIN — Medication 5 MILLIGRAM(S): at 23:15

## 2017-11-19 RX ADMIN — LIDOCAINE 1 PATCH: 4 CREAM TOPICAL at 23:19

## 2017-11-19 RX ADMIN — Medication 325 MILLIGRAM(S): at 11:18

## 2017-11-19 RX ADMIN — ENOXAPARIN SODIUM 30 MILLIGRAM(S): 100 INJECTION SUBCUTANEOUS at 17:53

## 2017-11-19 RX ADMIN — ONDANSETRON 4 MILLIGRAM(S): 8 TABLET, FILM COATED ORAL at 12:34

## 2017-11-19 RX ADMIN — Medication 1 EACH: at 17:53

## 2017-11-19 RX ADMIN — HYDROMORPHONE HYDROCHLORIDE 0.5 MILLIGRAM(S): 2 INJECTION INTRAMUSCULAR; INTRAVENOUS; SUBCUTANEOUS at 07:29

## 2017-11-19 RX ADMIN — NYSTATIN CREAM 1 APPLICATION(S): 100000 CREAM TOPICAL at 17:55

## 2017-11-19 RX ADMIN — ENOXAPARIN SODIUM 30 MILLIGRAM(S): 100 INJECTION SUBCUTANEOUS at 06:19

## 2017-11-19 RX ADMIN — PANTOPRAZOLE SODIUM 40 MILLIGRAM(S): 20 TABLET, DELAYED RELEASE ORAL at 06:19

## 2017-11-19 RX ADMIN — I.V. FAT EMULSION 20.8 GRAM(S): 20 EMULSION INTRAVENOUS at 22:32

## 2017-11-19 RX ADMIN — Medication 10 MILLIGRAM(S): at 17:54

## 2017-11-19 RX ADMIN — Medication 325 MILLIGRAM(S): at 23:12

## 2017-11-19 RX ADMIN — Medication 325 MILLIGRAM(S): at 12:25

## 2017-11-19 RX ADMIN — Medication 10 MILLIGRAM(S): at 06:19

## 2017-11-19 NOTE — PROGRESS NOTE ADULT - SUBJECTIVE AND OBJECTIVE BOX
O/N: VSS. BRAYDEN  11/18 : VAC changed. BRAYDEN OVERNIGHT EVENTS:   VSS. BRAYDEN  11/18 : VAC changed. BRAYDEN    STATUS POST:    7/24: SBR resection, fistula resection, revision of esophagogegunostomy drain through esophageal hiatus in R mediastinum  7/29: Ex-lap, SB anastamosis in BP limb breakdown with succus throughout abdomen  8/1: abd washout, drainage of abscess, biologic mesh placement, closure of abdominal wall, vac and retention suture  8/7: abd washout, mesh removal, frozen abd noted, LLQ CHECO replaced with benson drain  8/10: leak noted from previous anastamosis site on L side, mid abdomen malecot drain placed in enterotomy, JPs x 2 placed, necrotic fascia debrided, vicryl mesh sutured to fascia circumferentially, xeroform over mesh then vac dressing placed      SUBJECTIVE: Patient is examined bedside. Patient complains of pain around wound site during dressing change   Flatus: [X] YES [] NO             Bowel Movement: [X ] YES [ ] NO  Pain (0-10):            Pain Control Adequate: [X] YES [ ] NO  Nausea: [ ] YES [X ] NO            Vomiting: [ ] YES [X ] NO  Diarrhea: [ ] YES [X ] NO         Constipation: [ ] YES [X ] NO     Chest Pain: [ ] YES [X ] NO    SOB:  [ ] YES [ X] NO    enoxaparin Injectable 30 milliGRAM(s) SubCutaneous two times a day  heparin  flush 100 Units/mL Injectable 100 Unit(s) IV Push every other day  losartan 50 milliGRAM(s) Oral daily      Vital Signs Last 24 Hrs  T(C): 37.1 (19 Nov 2017 05:53), Max: 37.1 (18 Nov 2017 13:06)  T(F): 98.8 (19 Nov 2017 05:53), Max: 98.8 (19 Nov 2017 05:53)  HR: 71 (19 Nov 2017 05:53) (71 - 88)  BP: 158/86 (19 Nov 2017 05:53) (144/72 - 164/100)  BP(mean): --  RR: 17 (19 Nov 2017 05:53) (17 - 19)  SpO2: 92% (19 Nov 2017 05:53) (92% - 94%)  I&O's Detail    18 Nov 2017 07:01  -  19 Nov 2017 07:00  --------------------------------------------------------  IN:    TPN (Total Parenteral Nutrition): 876 mL  Total IN: 876 mL    OUT:  Total OUT: 0 mL    Total NET: 876 mL          General: NAD, resting comfortably in bed  C/V: NSR  Pulm: Nonlabored breathing, no respiratory distress  Abd:soft, NT/ND. wound [id number], 4 fistulas, no leak, dressing intact. good granulation tissue  Extrem: WWP, no edema, SCDs in place        LABS:                        11.0   6.9   )-----------( 275      ( 18 Nov 2017 10:59 )             36.1     11-18    137  |  98  |  32<H>  ----------------------------<  119<H>  3.8   |  28  |  0.56    Ca    10.0      18 Nov 2017 11:00  Phos  3.5     11-18  Mg     2.0     11-18    TPro  7.7  /  Alb  3.2<L>  /  TBili  0.9  /  DBili  x   /  AST  19  /  ALT  31  /  AlkPhos  285<H>  11-18          RADIOLOGY & ADDITIONAL STUDIES:

## 2017-11-20 LAB
-  AMIKACIN: SIGNIFICANT CHANGE UP
-  CEFAZOLIN: SIGNIFICANT CHANGE UP
-  CLINDAMYCIN: SIGNIFICANT CHANGE UP
-  ERYTHROMYCIN: SIGNIFICANT CHANGE UP
-  LINEZOLID: SIGNIFICANT CHANGE UP
-  OXACILLIN: SIGNIFICANT CHANGE UP
-  PENICILLIN: SIGNIFICANT CHANGE UP
-  RIFAMPIN: SIGNIFICANT CHANGE UP
-  TRIMETHOPRIM/SULFAMETHOXAZOLE: SIGNIFICANT CHANGE UP
-  VANCOMYCIN: SIGNIFICANT CHANGE UP
ANION GAP SERPL CALC-SCNC: 13 MMOL/L — SIGNIFICANT CHANGE UP (ref 5–17)
BUN SERPL-MCNC: 35 MG/DL — HIGH (ref 7–23)
CALCIUM SERPL-MCNC: 10.1 MG/DL — SIGNIFICANT CHANGE UP (ref 8.4–10.5)
CHLORIDE SERPL-SCNC: 99 MMOL/L — SIGNIFICANT CHANGE UP (ref 96–108)
CO2 SERPL-SCNC: 28 MMOL/L — SIGNIFICANT CHANGE UP (ref 22–31)
CREAT SERPL-MCNC: 0.61 MG/DL — SIGNIFICANT CHANGE UP (ref 0.5–1.3)
CULTURE RESULTS: SIGNIFICANT CHANGE UP
GLUCOSE BLDC GLUCOMTR-MCNC: 118 MG/DL — HIGH (ref 70–99)
GLUCOSE BLDC GLUCOMTR-MCNC: 129 MG/DL — HIGH (ref 70–99)
GLUCOSE BLDC GLUCOMTR-MCNC: 131 MG/DL — HIGH (ref 70–99)
GLUCOSE BLDC GLUCOMTR-MCNC: 71 MG/DL — SIGNIFICANT CHANGE UP (ref 70–99)
GLUCOSE BLDC GLUCOMTR-MCNC: 87 MG/DL — SIGNIFICANT CHANGE UP (ref 70–99)
GLUCOSE SERPL-MCNC: 140 MG/DL — HIGH (ref 70–99)
HCT VFR BLD CALC: 35.3 % — SIGNIFICANT CHANGE UP (ref 34.5–45)
HGB BLD-MCNC: 11 G/DL — LOW (ref 11.5–15.5)
MAGNESIUM SERPL-MCNC: 2.1 MG/DL — SIGNIFICANT CHANGE UP (ref 1.6–2.6)
MCHC RBC-ENTMCNC: 28 PG — SIGNIFICANT CHANGE UP (ref 27–34)
MCHC RBC-ENTMCNC: 31.2 G/DL — LOW (ref 32–36)
MCV RBC AUTO: 89.8 FL — SIGNIFICANT CHANGE UP (ref 80–100)
METHOD TYPE: SIGNIFICANT CHANGE UP
METHOD TYPE: SIGNIFICANT CHANGE UP
ORGANISM # SPEC MICROSCOPIC CNT: SIGNIFICANT CHANGE UP
PHOSPHATE SERPL-MCNC: 3.4 MG/DL — SIGNIFICANT CHANGE UP (ref 2.5–4.5)
PLATELET # BLD AUTO: 299 K/UL — SIGNIFICANT CHANGE UP (ref 150–400)
POTASSIUM SERPL-MCNC: 4.3 MMOL/L — SIGNIFICANT CHANGE UP (ref 3.5–5.3)
POTASSIUM SERPL-SCNC: 4.3 MMOL/L — SIGNIFICANT CHANGE UP (ref 3.5–5.3)
RBC # BLD: 3.93 M/UL — SIGNIFICANT CHANGE UP (ref 3.8–5.2)
RBC # FLD: 15.8 % — SIGNIFICANT CHANGE UP (ref 10.3–16.9)
SODIUM SERPL-SCNC: 140 MMOL/L — SIGNIFICANT CHANGE UP (ref 135–145)
SPECIMEN SOURCE: SIGNIFICANT CHANGE UP
WBC # BLD: 7.7 K/UL — SIGNIFICANT CHANGE UP (ref 3.8–10.5)
WBC # FLD AUTO: 7.7 K/UL — SIGNIFICANT CHANGE UP (ref 3.8–10.5)

## 2017-11-20 RX ORDER — ELECTROLYTE SOLUTION,INJ
1 VIAL (ML) INTRAVENOUS
Qty: 0 | Refills: 0 | Status: DISCONTINUED | OUTPATIENT
Start: 2017-11-20 | End: 2017-11-20

## 2017-11-20 RX ORDER — I.V. FAT EMULSION 20 G/100ML
50 EMULSION INTRAVENOUS ONCE
Qty: 0 | Refills: 0 | Status: COMPLETED | OUTPATIENT
Start: 2017-11-20 | End: 2017-11-20

## 2017-11-20 RX ORDER — HYDROMORPHONE HYDROCHLORIDE 2 MG/ML
0.25 INJECTION INTRAMUSCULAR; INTRAVENOUS; SUBCUTANEOUS ONCE
Qty: 0 | Refills: 0 | Status: DISCONTINUED | OUTPATIENT
Start: 2017-11-20 | End: 2017-11-20

## 2017-11-20 RX ORDER — GABAPENTIN 400 MG/1
100 CAPSULE ORAL
Qty: 0 | Refills: 0 | Status: DISCONTINUED | OUTPATIENT
Start: 2017-11-20 | End: 2017-12-27

## 2017-11-20 RX ADMIN — Medication 10 MILLIGRAM(S): at 05:28

## 2017-11-20 RX ADMIN — ENOXAPARIN SODIUM 30 MILLIGRAM(S): 100 INJECTION SUBCUTANEOUS at 05:27

## 2017-11-20 RX ADMIN — LIDOCAINE 1 PATCH: 4 CREAM TOPICAL at 23:00

## 2017-11-20 RX ADMIN — Medication 1 EACH: at 18:30

## 2017-11-20 RX ADMIN — LOSARTAN POTASSIUM 50 MILLIGRAM(S): 100 TABLET, FILM COATED ORAL at 05:28

## 2017-11-20 RX ADMIN — ESCITALOPRAM OXALATE 20 MILLIGRAM(S): 10 TABLET, FILM COATED ORAL at 21:57

## 2017-11-20 RX ADMIN — CALCITRIOL 1 MICROGRAM(S): 0.5 CAPSULE ORAL at 11:36

## 2017-11-20 RX ADMIN — Medication 50 MILLIGRAM(S): at 21:57

## 2017-11-20 RX ADMIN — LIDOCAINE 1 PATCH: 4 CREAM TOPICAL at 11:24

## 2017-11-20 RX ADMIN — PANTOPRAZOLE SODIUM 40 MILLIGRAM(S): 20 TABLET, DELAYED RELEASE ORAL at 05:28

## 2017-11-20 RX ADMIN — OCTREOTIDE ACETATE 450 MICROGRAM(S): 200 INJECTION, SOLUTION INTRAVENOUS; SUBCUTANEOUS at 11:37

## 2017-11-20 RX ADMIN — NYSTATIN CREAM 1 APPLICATION(S): 100000 CREAM TOPICAL at 18:30

## 2017-11-20 RX ADMIN — ENOXAPARIN SODIUM 30 MILLIGRAM(S): 100 INJECTION SUBCUTANEOUS at 18:28

## 2017-11-20 RX ADMIN — I.V. FAT EMULSION 20.83 GRAM(S): 20 EMULSION INTRAVENOUS at 18:30

## 2017-11-20 RX ADMIN — GABAPENTIN 100 MILLIGRAM(S): 400 CAPSULE ORAL at 07:50

## 2017-11-20 RX ADMIN — Medication 325 MILLIGRAM(S): at 19:50

## 2017-11-20 RX ADMIN — Medication 325 MILLIGRAM(S): at 07:52

## 2017-11-20 RX ADMIN — HYDROMORPHONE HYDROCHLORIDE 0.25 MILLIGRAM(S): 2 INJECTION INTRAMUSCULAR; INTRAVENOUS; SUBCUTANEOUS at 11:45

## 2017-11-20 RX ADMIN — Medication 1 APPLICATION(S): at 11:37

## 2017-11-20 RX ADMIN — Medication 325 MILLIGRAM(S): at 08:07

## 2017-11-20 RX ADMIN — Medication 325 MILLIGRAM(S): at 03:37

## 2017-11-20 RX ADMIN — Medication 325 MILLIGRAM(S): at 18:45

## 2017-11-20 RX ADMIN — Medication 325 MILLIGRAM(S): at 04:35

## 2017-11-20 RX ADMIN — Medication 10 MILLIGRAM(S): at 18:28

## 2017-11-20 RX ADMIN — GABAPENTIN 100 MILLIGRAM(S): 400 CAPSULE ORAL at 18:29

## 2017-11-20 RX ADMIN — Medication 1 APPLICATION(S): at 05:29

## 2017-11-20 RX ADMIN — Medication 100 UNIT(S): at 11:36

## 2017-11-20 RX ADMIN — ONDANSETRON 4 MILLIGRAM(S): 8 TABLET, FILM COATED ORAL at 05:46

## 2017-11-20 RX ADMIN — Medication 325 MILLIGRAM(S): at 14:00

## 2017-11-20 RX ADMIN — Medication 5 MILLIGRAM(S): at 21:57

## 2017-11-20 RX ADMIN — NYSTATIN CREAM 1 APPLICATION(S): 100000 CREAM TOPICAL at 05:29

## 2017-11-20 RX ADMIN — Medication 325 MILLIGRAM(S): at 13:05

## 2017-11-20 RX ADMIN — HYDROMORPHONE HYDROCHLORIDE 0.25 MILLIGRAM(S): 2 INJECTION INTRAMUSCULAR; INTRAVENOUS; SUBCUTANEOUS at 11:16

## 2017-11-20 NOTE — ADVANCED PRACTICE NURSE CONSULT - REASON FOR CONSULT
WOC nurse consult to change fistula pouch. WOC nurse consult to change pouching system over wound bed and fistulae.

## 2017-11-20 NOTE — PROGRESS NOTE ADULT - SUBJECTIVE AND OBJECTIVE BOX
O/N: valium reorderd . VSS. BRAYDEN  11/19: dressing changed, BRAYDEN O/N: valium reorderd . VSS. BRAYDEN  11/19: dressing changed, BRAYDEN      SUBJECTIVE: Patient seen and examined bedside by chief resident. No nausea/vomiting, c/o back pain    enoxaparin Injectable 30 milliGRAM(s) SubCutaneous two times a day  heparin  flush 100 Units/mL Injectable 100 Unit(s) IV Push every other day  losartan 50 milliGRAM(s) Oral daily      Vital Signs Last 24 Hrs  T(C): 36.6 (20 Nov 2017 05:11), Max: 36.9 (19 Nov 2017 21:05)  T(F): 97.8 (20 Nov 2017 05:11), Max: 98.4 (19 Nov 2017 21:05)  HR: 77 (20 Nov 2017 05:11) (72 - 82)  BP: 156/94 (20 Nov 2017 05:11) (134/74 - 156/94)  BP(mean): --  RR: 18 (20 Nov 2017 05:11) (16 - 18)  SpO2: 95% (20 Nov 2017 05:11) (94% - 96%)  I&O's Detail    19 Nov 2017 07:01  -  20 Nov 2017 07:00  --------------------------------------------------------  IN:    Fat Emulsion 20%: 166.4 mL    TPN (Total Parenteral Nutrition): 584 mL    TPN (Total Parenteral Nutrition): 876 mL  Total IN: 1626.4 mL    OUT:    Drain: 60 mL  Total OUT: 60 mL    Total NET: 1566.4 mL          General: NAD, resting comfortably in bed  C/V: NSR  Pulm: Nonlabored breathing, no respiratory distress  Abd: soft, NT/ND. dressing leaking. wound has healthy granulation tissue.   Extrem: WWP, no edema, SCDs in place        LABS:                        11.0   6.9   )-----------( 275      ( 18 Nov 2017 10:59 )             36.1     11-18    137  |  98  |  32<H>  ----------------------------<  119<H>  3.8   |  28  |  0.56    Ca    10.0      18 Nov 2017 11:00  Phos  3.5     11-18  Mg     2.0     11-18    TPro  7.7  /  Alb  3.2<L>  /  TBili  0.9  /  DBili  x   /  AST  19  /  ALT  31  /  AlkPhos  285<H>  11-18          RADIOLOGY & ADDITIONAL STUDIES:

## 2017-11-20 NOTE — PROGRESS NOTE ADULT - ASSESSMENT
57 F s/p  SBR, fistula resection, esophagojejunostomy revision w/ post-op course complicated by RTOR for distal anastomosis breakdown, ATN, acute respiratory failure, & septic shock, MRSA bacteremia which resolved. Currently for wound care management.   NPO with sips and chips /TPN/IVF with TF @5cc/hr  Pain/nausea control - only benadryl at midnight and dilaudid 0.25mg during PT session   ISS/OOB with PT daily  Nystatin powder, vancomycin 750 Q12 (10/11-11/8)  SCDs, lovenox, OOBA  Lines :  L left subclavian line (10/4-- )  AM labs, weekly albumin, pre-albumin and triglyceride (every friday)  Wet to dry dressing in place  NGT in fistula for tube feeds 57 F s/p  SBR, fistula resection, esophagojejunostomy revision w/ post-op course complicated by RTOR for distal anastomosis breakdown, ATN, acute respiratory failure, & septic shock, MRSA bacteremia which resolved. Currently for wound care management.     Gabapentin for back pain  NPO with sips and chips /TPN/IVF   Pain/nausea control - only benadryl at midnight and dilaudid 0.25mg during PT session   ISS/OOB with PT daily  Nystatin powder, vancomycin 750 Q12 (10/11-11/8)  SCDs, lovenox, OOBA  Lines :  L left subclavian line (10/4-- )  AM labs, weekly albumin, pre-albumin and triglyceride (every friday)  Wet to dry dressing in place  NGT in fistula

## 2017-11-20 NOTE — ADVANCED PRACTICE NURSE CONSULT - ASSESSMENT
Fistula pouch leaking, wound bed with pale red, nongranular tiisue, multiple fistulae draining light green effluent. Applied Aquacel Extra to wound bed, Capo ring to skin fold at 4 o'clock, skin barriers to wound edges, Capo ring to inner edges of Capo Wound Manager to create convexity, sealed edges with Stoma paste and skin barrier. Fistula pouch leaking, wound bed with pale red, nongranular tissue, multiple fistulae in wound bed draining light green effluent. Applied Aquacel Extra to wound bed, Capo ring to skin fold at 4 o'clock, skin barriers to wound edges, Capo ring to inner edges of Capo Wound Manager to create convexity, sealed edges with Stoma paste and skin barrier.

## 2017-11-21 LAB
ANION GAP SERPL CALC-SCNC: 10 MMOL/L — SIGNIFICANT CHANGE UP (ref 5–17)
BUN SERPL-MCNC: 34 MG/DL — HIGH (ref 7–23)
CALCIUM SERPL-MCNC: 9.9 MG/DL — SIGNIFICANT CHANGE UP (ref 8.4–10.5)
CHLORIDE SERPL-SCNC: 101 MMOL/L — SIGNIFICANT CHANGE UP (ref 96–108)
CO2 SERPL-SCNC: 29 MMOL/L — SIGNIFICANT CHANGE UP (ref 22–31)
CREAT SERPL-MCNC: 0.57 MG/DL — SIGNIFICANT CHANGE UP (ref 0.5–1.3)
GLUCOSE BLDC GLUCOMTR-MCNC: 110 MG/DL — HIGH (ref 70–99)
GLUCOSE BLDC GLUCOMTR-MCNC: 117 MG/DL — HIGH (ref 70–99)
GLUCOSE BLDC GLUCOMTR-MCNC: 130 MG/DL — HIGH (ref 70–99)
GLUCOSE BLDC GLUCOMTR-MCNC: 132 MG/DL — HIGH (ref 70–99)
GLUCOSE SERPL-MCNC: 101 MG/DL — HIGH (ref 70–99)
GRAM STN FLD: SIGNIFICANT CHANGE UP
GRAM STN FLD: SIGNIFICANT CHANGE UP
HCT VFR BLD CALC: 33.9 % — LOW (ref 34.5–45)
HGB BLD-MCNC: 10.4 G/DL — LOW (ref 11.5–15.5)
MAGNESIUM SERPL-MCNC: 2.1 MG/DL — SIGNIFICANT CHANGE UP (ref 1.6–2.6)
MCHC RBC-ENTMCNC: 27.8 PG — SIGNIFICANT CHANGE UP (ref 27–34)
MCHC RBC-ENTMCNC: 30.7 G/DL — LOW (ref 32–36)
MCV RBC AUTO: 90.6 FL — SIGNIFICANT CHANGE UP (ref 80–100)
PHOSPHATE SERPL-MCNC: 3.3 MG/DL — SIGNIFICANT CHANGE UP (ref 2.5–4.5)
PLATELET # BLD AUTO: 282 K/UL — SIGNIFICANT CHANGE UP (ref 150–400)
POTASSIUM SERPL-MCNC: 4.2 MMOL/L — SIGNIFICANT CHANGE UP (ref 3.5–5.3)
POTASSIUM SERPL-SCNC: 4.2 MMOL/L — SIGNIFICANT CHANGE UP (ref 3.5–5.3)
RBC # BLD: 3.74 M/UL — LOW (ref 3.8–5.2)
RBC # FLD: 16.1 % — SIGNIFICANT CHANGE UP (ref 10.3–16.9)
SODIUM SERPL-SCNC: 140 MMOL/L — SIGNIFICANT CHANGE UP (ref 135–145)
WBC # BLD: 6.1 K/UL — SIGNIFICANT CHANGE UP (ref 3.8–10.5)
WBC # FLD AUTO: 6.1 K/UL — SIGNIFICANT CHANGE UP (ref 3.8–10.5)

## 2017-11-21 RX ORDER — ELECTROLYTE SOLUTION,INJ
1 VIAL (ML) INTRAVENOUS
Qty: 0 | Refills: 0 | Status: DISCONTINUED | OUTPATIENT
Start: 2017-11-21 | End: 2017-11-21

## 2017-11-21 RX ADMIN — Medication 325 MILLIGRAM(S): at 14:54

## 2017-11-21 RX ADMIN — LIDOCAINE 1 PATCH: 4 CREAM TOPICAL at 11:12

## 2017-11-21 RX ADMIN — Medication 325 MILLIGRAM(S): at 15:54

## 2017-11-21 RX ADMIN — ONDANSETRON 4 MILLIGRAM(S): 8 TABLET, FILM COATED ORAL at 17:40

## 2017-11-21 RX ADMIN — SODIUM CHLORIDE 10 MILLILITER(S): 9 INJECTION INTRAMUSCULAR; INTRAVENOUS; SUBCUTANEOUS at 21:15

## 2017-11-21 RX ADMIN — GABAPENTIN 100 MILLIGRAM(S): 400 CAPSULE ORAL at 17:40

## 2017-11-21 RX ADMIN — GABAPENTIN 100 MILLIGRAM(S): 400 CAPSULE ORAL at 06:15

## 2017-11-21 RX ADMIN — Medication 50 MILLIGRAM(S): at 21:13

## 2017-11-21 RX ADMIN — Medication 325 MILLIGRAM(S): at 08:23

## 2017-11-21 RX ADMIN — Medication 325 MILLIGRAM(S): at 23:30

## 2017-11-21 RX ADMIN — NYSTATIN CREAM 1 APPLICATION(S): 100000 CREAM TOPICAL at 07:01

## 2017-11-21 RX ADMIN — ENOXAPARIN SODIUM 30 MILLIGRAM(S): 100 INJECTION SUBCUTANEOUS at 17:40

## 2017-11-21 RX ADMIN — Medication 325 MILLIGRAM(S): at 23:00

## 2017-11-21 RX ADMIN — ESCITALOPRAM OXALATE 20 MILLIGRAM(S): 10 TABLET, FILM COATED ORAL at 21:13

## 2017-11-21 RX ADMIN — Medication 1 APPLICATION(S): at 06:16

## 2017-11-21 RX ADMIN — Medication 5 MILLIGRAM(S): at 21:14

## 2017-11-21 RX ADMIN — OCTREOTIDE ACETATE 450 MICROGRAM(S): 200 INJECTION, SOLUTION INTRAVENOUS; SUBCUTANEOUS at 11:12

## 2017-11-21 RX ADMIN — LOSARTAN POTASSIUM 50 MILLIGRAM(S): 100 TABLET, FILM COATED ORAL at 06:14

## 2017-11-21 RX ADMIN — ONDANSETRON 4 MILLIGRAM(S): 8 TABLET, FILM COATED ORAL at 11:12

## 2017-11-21 RX ADMIN — ENOXAPARIN SODIUM 30 MILLIGRAM(S): 100 INJECTION SUBCUTANEOUS at 06:14

## 2017-11-21 RX ADMIN — Medication 325 MILLIGRAM(S): at 18:54

## 2017-11-21 RX ADMIN — Medication 1 EACH: at 17:40

## 2017-11-21 RX ADMIN — Medication 325 MILLIGRAM(S): at 02:51

## 2017-11-21 RX ADMIN — Medication 325 MILLIGRAM(S): at 03:30

## 2017-11-21 RX ADMIN — Medication 10 MILLIGRAM(S): at 06:27

## 2017-11-21 RX ADMIN — Medication 325 MILLIGRAM(S): at 19:54

## 2017-11-21 RX ADMIN — Medication 325 MILLIGRAM(S): at 09:23

## 2017-11-21 RX ADMIN — Medication 10 MILLIGRAM(S): at 17:40

## 2017-11-21 RX ADMIN — PANTOPRAZOLE SODIUM 40 MILLIGRAM(S): 20 TABLET, DELAYED RELEASE ORAL at 06:14

## 2017-11-21 NOTE — PROGRESS NOTE ADULT - SUBJECTIVE AND OBJECTIVE BOX
On interval followup, the left subclavian venous catheter site is clean, dry, non-inflamed  and non-tender. T 99 range. Notes and recent cultures are followed.

## 2017-11-21 NOTE — PROGRESS NOTE ADULT - SUBJECTIVE AND OBJECTIVE BOX
O/N: VSS. BRAYDEN  11/20 : dressing changed, PT session, TPN with lipids. OVERNIGHT EVENTS:  VSS. BRAYDEN  11/20 : dressing changed, PT session, TPN with lipids.        SUBJECTIVE: Patient denies any complaints  Flatus: [X] YES [] NO             Bowel Movement: [ X] YES [ ] NO  Pain (0-10):            Pain Control Adequate: [X ] YES [ ] NO  Nausea: [ ] YES [X ] NO            Vomiting: [ ] YES [ X] NO  Diarrhea: [ ] YES [X ] NO         Constipation: [ ] YES [X ] NO     Chest Pain: [ ] YES [X ] NO    SOB:  [ ] YES [X ] NO    enoxaparin Injectable 30 milliGRAM(s) SubCutaneous two times a day  heparin  flush 100 Units/mL Injectable 100 Unit(s) IV Push every other day  losartan 50 milliGRAM(s) Oral daily      Vital Signs Last 24 Hrs  T(C): 37.2 (21 Nov 2017 09:26), Max: 37.2 (21 Nov 2017 06:10)  T(F): 99 (21 Nov 2017 09:26), Max: 99 (21 Nov 2017 06:10)  HR: 84 (21 Nov 2017 09:26) (79 - 87)  BP: 135/81 (21 Nov 2017 09:26) (94/61 - 137/88)  BP(mean): --  RR: 16 (21 Nov 2017 09:26) (16 - 17)  SpO2: 94% (21 Nov 2017 09:26) (92% - 94%)  I&O's Detail    20 Nov 2017 07:01  -  21 Nov 2017 07:00  --------------------------------------------------------  IN:    Fat Emulsion 20%: 41.6 mL    Fat Emulsion 20%: 249.6 mL    TPN (Total Parenteral Nutrition): 1686.3 mL  Total IN: 1977.5 mL    OUT:  Total OUT: 0 mL    Total NET: 1977.5 mL          General: NAD, resting comfortably in bed  C/V: NSR  Pulm: Nonlabored breathing, no respiratory distress  Abd: soft, NT/ND. dressing clean and dry. wound has healthy granulation tissue.   Extrem: WWP, no edema, SCDs in place        LABS:                        10.4   6.1   )-----------( 282      ( 21 Nov 2017 07:22 )             33.9     11-21    140  |  101  |  34<H>  ----------------------------<  101<H>  4.2   |  29  |  0.57    Ca    9.9      21 Nov 2017 07:22  Phos  3.3     11-21  Mg     2.1     11-21

## 2017-11-21 NOTE — CHART NOTE - NSCHARTNOTEFT_GEN_A_CORE
Admitting Diagnosis:   Patient is a 57y old  Female who presents with a chief complaint of enterocutaneous fistula (24 Jul 2017 14:15)      PAST MEDICAL & SURGICAL HISTORY:  Reflux  Obesity  DVT (deep venous thrombosis): 2013 neck  Diverticulitis  Hypertension  Elective surgery: abodominal wall surgery  dec 2016  Elective surgery: NOV 2016 gastric bypass revision  Gastric bypass status for obesity: gastric sleeve, 6/2012  S/P breast biopsy: 2011, left  S/P colon resection: 2011  S/P knee surgery: repair 2009, 2011  right; left  Umbilical hernia: 2000  S/P appendectomy: 1975  S/P tonsillectomy: 1967      Current Nutrition Order:   >> NPO with Ice Chips/Sips of Water   >> TPN via central venous line:  1752 TV (73ml/hr); 355g D, 140g AA, 50g 20% lipids (2267 kcal, 2.7g/kg IBW pro, GIR 3.30)    PO Intake: Good (%) [   ]  Fair (50-75%) [   ] Poor (<25%) [   ] N/A    GI Issues: Pt reports no GI distress at present.    Pain: Pain controlled.     Skin Integrity: surgical incision, pascual score 15, ostomy bag,  NGtube placed w/ sponge through pouch into fistula for suction    Labs:   11-21    140  |  101  |  34<H>  ----------------------------<  101<H>  4.2   |  29  |  0.57    Ca    9.9      21 Nov 2017 07:22  Phos  3.3     11-21  Mg     2.1     11-21      CAPILLARY BLOOD GLUCOSE      POCT Blood Glucose.: 117 mg/dL (21 Nov 2017 17:23)  POCT Blood Glucose.: 110 mg/dL (21 Nov 2017 12:16)  POCT Blood Glucose.: 130 mg/dL (21 Nov 2017 06:16)  POCT Blood Glucose.: 131 mg/dL (20 Nov 2017 23:44)      Medications:  MEDICATIONS  (STANDING):  bismuth subsalicylate Liquid 30 milliLiter(s) Oral daily  calcitriol Injectable 1 MICROGram(s) IV Push <User Schedule>  collagenase Ointment 1 Application(s) Topical daily  cyanocobalamin Injectable 1000 MICROGram(s) SubCutaneous every 7 days  Dakins Solution - 1/2 Strength 1 Application(s) Topical daily  dextrose 5%. 1000 milliLiter(s) (50 mL/Hr) IV Continuous <Continuous>  dextrose 50% Injectable 25 Gram(s) IV Push once  dextrose 50% Injectable 12.5 Gram(s) IV Push once  diazepam    Tablet 5 milliGRAM(s) Oral at bedtime  diphenhydrAMINE   Injectable 50 milliGRAM(s) IV Push at bedtime  enoxaparin Injectable 30 milliGRAM(s) SubCutaneous two times a day  escitalopram 20 milliGRAM(s) Oral daily  gabapentin 100 milliGRAM(s) Oral two times a day  heparin  flush 100 Units/mL Injectable 100 Unit(s) IV Push every other day  insulin lispro (HumaLOG) corrective regimen sliding scale   SubCutaneous every 6 hours  lidocaine   Patch 1 Patch Transdermal daily  losartan 50 milliGRAM(s) Oral daily  nystatin Powder 1 Application(s) Topical two times a day  octreotide  Injectable 450 MICROGram(s) IV Push daily  oxybutynin 10 milliGRAM(s) Oral two times a day  pantoprazole  Injectable 40 milliGRAM(s) IV Push daily  Parenteral Nutrition - Adult 1 Each (73 mL/Hr) TPN Continuous <Continuous>  Parenteral Nutrition - Adult 1 Each (73 mL/Hr) TPN Continuous <Continuous>  sodium chloride 0.9% lock flush 20 milliLiter(s) IV Push once    MEDICATIONS  (PRN):  acetaminophen   Tablet. 325 milliGRAM(s) Oral every 4 hours PRN Moderate Pain (4 - 6)  ondansetron Injectable 4 milliGRAM(s) IV Push every 6 hours PRN Nausea and/or Vomiting  sodium chloride 0.9% lock flush 10 milliLiter(s) IV Push every 1 hour PRN After each medication administration  sodium chloride 0.9% lock flush 10 milliLiter(s) IV Push every 12 hours PRN Lumen of catheter NOT used      Weight:  Bed Scale:  161.5lb (11/13)  160.5lb (11/8)   Standing Scale:  167lb (11/13)  164lb (10/17)  219lb (8/1)    Weight Change:   03/2016: 89kg  02/2017: 74kg   07/2017: 76kg  11/2017: 76kg   Wt appears to be stable over last 4 months. Please continue to take wts for trending.      Estimated energy needs:   Estimated energy needs using 52 kg IBW:  increased needs per wound and pressure ulcer healing. needs calculated based on TPN as primary route of nutrition.  30-35 kcal/kg (9773-1081 kcal).   1.8-2 g/kg ( g protein).  30-35 mL/kg (3605-1280 mL fluid).     Subjective:   Nutrition course remains the same. NPO except ice chips and sips of water. TPN bag infusing at 73ml/hr providing 355g Dex, 140g AA, 50g 20% lipids (2267 kcal, 2.7g/kg IBW pro, GIR 3.30). Pt asking about eating crackers. No apparent GI distress. NGtube placed w/ sponge through pouch into fistula for suction. No new standing wt taken since 11/13 - wt has been relatively stable over last 4 months of being in-pt. Will adjust nutrition recs if pre-alb is not WNL and/or wt trends down. Please obtain recent wt for trending.    Previous Nutrition Diagnosis: Increased nutrient needs RT increased demand for nutrients AEB wound and pressure ulcer healing   Active [  X]  Resolved [   ]    Goal: Pt to meet >75% EER via tolerated route    Recommendations:  1) Continue w/ current TPN order as tolerated and wt is stable and pre-alb is WNL  2) Monitor triglycerides and adjust lipids per MD discretion  3) Please continue to take standing weight for trending purposes  3) PO diet per MD discretion.   4) Monitor tolerance to EnsurePudding (170kcal, 4g pro)  5) Check labs for signs of fat malabsorption, consider ADEK vitamin.    Education: Understands meeting nutrient needs via TPN.    Risk Level: High [ X  ] Moderate [   ] Low [   ].

## 2017-11-21 NOTE — PROGRESS NOTE ADULT - SUBJECTIVE AND OBJECTIVE BOX
stable and nutrition is fine  placed ng with sponge into lumen  with bag around  think this is best way to manage for now

## 2017-11-21 NOTE — ADVANCED PRACTICE NURSE CONSULT - ASSESSMENT
Wound manager over abdominal wound and fistulas remains intact. Dr Brady inserted NG tube through pouch into fistula and attached to wall suction on low.

## 2017-11-22 LAB
ANION GAP SERPL CALC-SCNC: 9 MMOL/L — SIGNIFICANT CHANGE UP (ref 5–17)
BUN SERPL-MCNC: 34 MG/DL — HIGH (ref 7–23)
CALCIUM SERPL-MCNC: 9.9 MG/DL — SIGNIFICANT CHANGE UP (ref 8.4–10.5)
CHLORIDE SERPL-SCNC: 99 MMOL/L — SIGNIFICANT CHANGE UP (ref 96–108)
CO2 SERPL-SCNC: 30 MMOL/L — SIGNIFICANT CHANGE UP (ref 22–31)
CREAT SERPL-MCNC: 0.62 MG/DL — SIGNIFICANT CHANGE UP (ref 0.5–1.3)
GLUCOSE BLDC GLUCOMTR-MCNC: 104 MG/DL — HIGH (ref 70–99)
GLUCOSE BLDC GLUCOMTR-MCNC: 117 MG/DL — HIGH (ref 70–99)
GLUCOSE BLDC GLUCOMTR-MCNC: 124 MG/DL — HIGH (ref 70–99)
GLUCOSE BLDC GLUCOMTR-MCNC: 82 MG/DL — SIGNIFICANT CHANGE UP (ref 70–99)
GLUCOSE SERPL-MCNC: 120 MG/DL — HIGH (ref 70–99)
HCT VFR BLD CALC: 33.1 % — LOW (ref 34.5–45)
HGB BLD-MCNC: 10.1 G/DL — LOW (ref 11.5–15.5)
MAGNESIUM SERPL-MCNC: 2 MG/DL — SIGNIFICANT CHANGE UP (ref 1.6–2.6)
MCHC RBC-ENTMCNC: 27.5 PG — SIGNIFICANT CHANGE UP (ref 27–34)
MCHC RBC-ENTMCNC: 30.5 G/DL — LOW (ref 32–36)
MCV RBC AUTO: 90.2 FL — SIGNIFICANT CHANGE UP (ref 80–100)
PHOSPHATE SERPL-MCNC: 3.4 MG/DL — SIGNIFICANT CHANGE UP (ref 2.5–4.5)
PLATELET # BLD AUTO: 275 K/UL — SIGNIFICANT CHANGE UP (ref 150–400)
POTASSIUM SERPL-MCNC: 4 MMOL/L — SIGNIFICANT CHANGE UP (ref 3.5–5.3)
POTASSIUM SERPL-SCNC: 4 MMOL/L — SIGNIFICANT CHANGE UP (ref 3.5–5.3)
RBC # BLD: 3.67 M/UL — LOW (ref 3.8–5.2)
RBC # FLD: 16.2 % — SIGNIFICANT CHANGE UP (ref 10.3–16.9)
SODIUM SERPL-SCNC: 138 MMOL/L — SIGNIFICANT CHANGE UP (ref 135–145)
WBC # BLD: 7.5 K/UL — SIGNIFICANT CHANGE UP (ref 3.8–10.5)
WBC # FLD AUTO: 7.5 K/UL — SIGNIFICANT CHANGE UP (ref 3.8–10.5)

## 2017-11-22 RX ORDER — ELECTROLYTE SOLUTION,INJ
1 VIAL (ML) INTRAVENOUS
Qty: 0 | Refills: 0 | Status: DISCONTINUED | OUTPATIENT
Start: 2017-11-22 | End: 2017-11-22

## 2017-11-22 RX ORDER — ACETAMINOPHEN 500 MG
1000 TABLET ORAL ONCE
Qty: 0 | Refills: 0 | Status: COMPLETED | OUTPATIENT
Start: 2017-11-22 | End: 2017-11-22

## 2017-11-22 RX ORDER — I.V. FAT EMULSION 20 G/100ML
50 EMULSION INTRAVENOUS ONCE
Qty: 0 | Refills: 0 | Status: COMPLETED | OUTPATIENT
Start: 2017-11-22 | End: 2017-11-22

## 2017-11-22 RX ORDER — DAPTOMYCIN 500 MG/10ML
800 INJECTION, POWDER, LYOPHILIZED, FOR SOLUTION INTRAVENOUS EVERY 24 HOURS
Qty: 0 | Refills: 0 | Status: DISCONTINUED | OUTPATIENT
Start: 2017-11-22 | End: 2017-12-01

## 2017-11-22 RX ADMIN — Medication 5 MILLIGRAM(S): at 21:07

## 2017-11-22 RX ADMIN — Medication 325 MILLIGRAM(S): at 08:00

## 2017-11-22 RX ADMIN — Medication 1 APPLICATION(S): at 06:12

## 2017-11-22 RX ADMIN — Medication 325 MILLIGRAM(S): at 13:20

## 2017-11-22 RX ADMIN — Medication 100 UNIT(S): at 12:12

## 2017-11-22 RX ADMIN — OCTREOTIDE ACETATE 450 MICROGRAM(S): 200 INJECTION, SOLUTION INTRAVENOUS; SUBCUTANEOUS at 12:11

## 2017-11-22 RX ADMIN — Medication 325 MILLIGRAM(S): at 12:10

## 2017-11-22 RX ADMIN — I.V. FAT EMULSION 10.42 GRAM(S): 20 EMULSION INTRAVENOUS at 18:03

## 2017-11-22 RX ADMIN — ENOXAPARIN SODIUM 30 MILLIGRAM(S): 100 INJECTION SUBCUTANEOUS at 06:12

## 2017-11-22 RX ADMIN — PANTOPRAZOLE SODIUM 40 MILLIGRAM(S): 20 TABLET, DELAYED RELEASE ORAL at 06:12

## 2017-11-22 RX ADMIN — Medication 325 MILLIGRAM(S): at 18:44

## 2017-11-22 RX ADMIN — Medication 10 MILLIGRAM(S): at 18:03

## 2017-11-22 RX ADMIN — ONDANSETRON 4 MILLIGRAM(S): 8 TABLET, FILM COATED ORAL at 21:13

## 2017-11-22 RX ADMIN — Medication 1000 MILLIGRAM(S): at 22:19

## 2017-11-22 RX ADMIN — Medication 10 MILLIGRAM(S): at 06:12

## 2017-11-22 RX ADMIN — GABAPENTIN 100 MILLIGRAM(S): 400 CAPSULE ORAL at 06:12

## 2017-11-22 RX ADMIN — DAPTOMYCIN 132 MILLIGRAM(S): 500 INJECTION, POWDER, LYOPHILIZED, FOR SOLUTION INTRAVENOUS at 18:03

## 2017-11-22 RX ADMIN — ENOXAPARIN SODIUM 30 MILLIGRAM(S): 100 INJECTION SUBCUTANEOUS at 18:03

## 2017-11-22 RX ADMIN — Medication 1 EACH: at 18:03

## 2017-11-22 RX ADMIN — LOSARTAN POTASSIUM 50 MILLIGRAM(S): 100 TABLET, FILM COATED ORAL at 06:12

## 2017-11-22 RX ADMIN — LIDOCAINE 1 PATCH: 4 CREAM TOPICAL at 12:12

## 2017-11-22 RX ADMIN — GABAPENTIN 100 MILLIGRAM(S): 400 CAPSULE ORAL at 18:03

## 2017-11-22 RX ADMIN — NYSTATIN CREAM 1 APPLICATION(S): 100000 CREAM TOPICAL at 06:13

## 2017-11-22 RX ADMIN — Medication 400 MILLIGRAM(S): at 22:09

## 2017-11-22 RX ADMIN — CALCITRIOL 1 MICROGRAM(S): 0.5 CAPSULE ORAL at 12:11

## 2017-11-22 RX ADMIN — Medication 325 MILLIGRAM(S): at 18:02

## 2017-11-22 RX ADMIN — Medication 325 MILLIGRAM(S): at 04:51

## 2017-11-22 RX ADMIN — Medication 325 MILLIGRAM(S): at 08:34

## 2017-11-22 RX ADMIN — LIDOCAINE 1 PATCH: 4 CREAM TOPICAL at 00:04

## 2017-11-22 RX ADMIN — Medication 50 MILLIGRAM(S): at 21:07

## 2017-11-22 RX ADMIN — ESCITALOPRAM OXALATE 20 MILLIGRAM(S): 10 TABLET, FILM COATED ORAL at 21:07

## 2017-11-22 RX ADMIN — ONDANSETRON 4 MILLIGRAM(S): 8 TABLET, FILM COATED ORAL at 01:26

## 2017-11-22 RX ADMIN — Medication 325 MILLIGRAM(S): at 04:10

## 2017-11-22 RX ADMIN — ONDANSETRON 4 MILLIGRAM(S): 8 TABLET, FILM COATED ORAL at 08:00

## 2017-11-22 RX ADMIN — PREGABALIN 1000 MICROGRAM(S): 225 CAPSULE ORAL at 12:11

## 2017-11-22 NOTE — PROGRESS NOTE BEHAVIORAL HEALTH - NSBHFUPINTERVALHXFT_PSY_A_CORE
Patient seen at bedside with psychology intern Mark Murdock.   present.  Patient and  brought in food for staff.   will visit patient Thanskgiving morning.  Patient spoke about her granddaughter.

## 2017-11-22 NOTE — PROGRESS NOTE ADULT - SUBJECTIVE AND OBJECTIVE BOX
O/N: blood cultures grew: gram + cocci in clusters. VSS. BRAYDEN  11/21: Dressing changed , BRAYDEN , VSS OVERNIGHT EVENTS: blood cultures grew: gram + cocci in clusters. VSS. BRAYDEN  11/21: Dressing changed , BRAYDEN , VSS    STATUS POST:    7/29: Ex-lap, SB anastamosis in BP limb breakdown with succus throughout abdomen  8/1: abd washout, drainage of abscess, biologic mesh placement, closure of abdominal wall, vac and retention suture  8/7: abd washout, mesh removal, frozen abd noted, LLQ CHECO replaced with benson drain  8/10: leak noted from previous anastamosis site on L side, mid abdomen malecot drain placed in enterotomy, JPs x 2 placed, necrotic fascia debrided, vicryl mesh sutured to fascia circumferentially, xeroform over mesh then vac dressing placed      SUBJECTIVE:  Patient examined bedside. Patient denies any complaints  Flatus: [] YES [X] NO             Bowel Movement: [ ] YES [X ] NO  Pain (0-10):            Pain Control Adequate: [ X] YES [ ] NO  Nausea: [ ] YES [X ] NO            Vomiting: [ ] YES [X ] NO  Diarrhea: [ ] YES [ X] NO         Constipation: [ ] YES [X ] NO     Chest Pain: [ ] YES [X ] NO    SOB:  [ ] YES [ ] NO    enoxaparin Injectable 30 milliGRAM(s) SubCutaneous two times a day  heparin  flush 100 Units/mL Injectable 100 Unit(s) IV Push every other day  losartan 50 milliGRAM(s) Oral daily      Vital Signs Last 24 Hrs  T(C): 37.9 (22 Nov 2017 05:08), Max: 37.9 (22 Nov 2017 05:08)  T(F): 100.2 (22 Nov 2017 05:08), Max: 100.2 (22 Nov 2017 05:08)  HR: 96 (22 Nov 2017 05:08) (74 - 96)  BP: 145/88 (22 Nov 2017 05:08) (120/77 - 145/88)  BP(mean): --  RR: 16 (22 Nov 2017 05:08) (16 - 17)  SpO2: 95% (22 Nov 2017 05:08) (94% - 95%)  I&O's Detail    21 Nov 2017 07:01  -  22 Nov 2017 07:00  --------------------------------------------------------  IN:    TPN (Total Parenteral Nutrition): 803 mL  Total IN: 803 mL    OUT:    Drain: 200 mL  Total OUT: 200 mL    Total NET: 603 mL          General: NAD, resting comfortably in bed  C/V: NSR  Pulm: Nonlabored breathing, no respiratory distress  Abd: soft, NT/ND. dressing clean and dry. wound has healthy granulation tissue.   Extrem: WWP, no edema, SCDs in place        LABS:                        10.4   6.1   )-----------( 282      ( 21 Nov 2017 07:22 )             33.9     11-21    140  |  101  |  34<H>  ----------------------------<  101<H>  4.2   |  29  |  0.57    Ca    9.9      21 Nov 2017 07:22  Phos  3.3     11-21  Mg     2.1     11-21

## 2017-11-22 NOTE — CHART NOTE - NSCHARTNOTEFT_GEN_A_CORE
Admitting Diagnosis:   Patient is a 57y old  Female who presents with a chief complaint of enterocutaneous fistula (24 Jul 2017 14:15)      PAST MEDICAL & SURGICAL HISTORY:  Reflux  Obesity  DVT (deep venous thrombosis): 2013 neck  Diverticulitis  Hypertension  Elective surgery: abodominal wall surgery  dec 2016  Elective surgery: NOV 2016 gastric bypass revision  Gastric bypass status for obesity: gastric sleeve, 6/2012  S/P breast biopsy: 2011, left  S/P colon resection: 2011  S/P knee surgery: repair 2009, 2011  right; left  Umbilical hernia: 2000  S/P appendectomy: 1975  S/P tonsillectomy: 1967      Current Nutrition Order:  NPO with Ice Chips/Sips of Water   >> TPN via central venous line:  1752 TV (73ml/hr); 355g D, 140g AA (1767kcal non lipid calories) and  50g 20% lipids; total kcal w/ lipids: (2267 kcal, 2.7g/kg IBW pro, GIR 3.30)         PO Intake: Good (%) [   ]  Fair (50-75%) [   ] Poor (<25%) [   ]-n/a    GI Issues: none    Pain: only back pain controlled w/ pain meds    Skin Integrity: sacrum un-stageable PU; sacral abrasion; abd wound--> NGtube placed w/ sponge through pouch into fistula for suction      Labs:   11-21    140  |  101  |  34<H>  ----------------------------<  101<H>  4.2   |  29  |  0.57    Ca    9.9      21 Nov 2017 07:22  Phos  3.3     11-21  Mg     2.1     11-21      CAPILLARY BLOOD GLUCOSE      POCT Blood Glucose.: 124 mg/dL (22 Nov 2017 05:45)  POCT Blood Glucose.: 132 mg/dL (21 Nov 2017 23:46)  POCT Blood Glucose.: 117 mg/dL (21 Nov 2017 17:23)  POCT Blood Glucose.: 110 mg/dL (21 Nov 2017 12:16)      Medications:  MEDICATIONS  (STANDING):  bismuth subsalicylate Liquid 30 milliLiter(s) Oral daily  calcitriol Injectable 1 MICROGram(s) IV Push <User Schedule>  collagenase Ointment 1 Application(s) Topical daily  cyanocobalamin Injectable 1000 MICROGram(s) SubCutaneous every 7 days  Dakins Solution - 1/2 Strength 1 Application(s) Topical daily  dextrose 5%. 1000 milliLiter(s) (50 mL/Hr) IV Continuous <Continuous>  dextrose 50% Injectable 25 Gram(s) IV Push once  dextrose 50% Injectable 12.5 Gram(s) IV Push once  diazepam    Tablet 5 milliGRAM(s) Oral at bedtime  diphenhydrAMINE   Injectable 50 milliGRAM(s) IV Push at bedtime  enoxaparin Injectable 30 milliGRAM(s) SubCutaneous two times a day  escitalopram 20 milliGRAM(s) Oral daily  fat emulsion (Plant Based) 20% IVPB 50 Gram(s) IV Intermittent once  gabapentin 100 milliGRAM(s) Oral two times a day  heparin  flush 100 Units/mL Injectable 100 Unit(s) IV Push every other day  insulin lispro (HumaLOG) corrective regimen sliding scale   SubCutaneous every 6 hours  lidocaine   Patch 1 Patch Transdermal daily  losartan 50 milliGRAM(s) Oral daily  nystatin Powder 1 Application(s) Topical two times a day  octreotide  Injectable 450 MICROGram(s) IV Push daily  oxybutynin 10 milliGRAM(s) Oral two times a day  pantoprazole  Injectable 40 milliGRAM(s) IV Push daily  Parenteral Nutrition - Adult 1 Each (73 mL/Hr) TPN Continuous <Continuous>  Parenteral Nutrition - Adult 1 Each (73 mL/Hr) TPN Continuous <Continuous>  sodium chloride 0.9% lock flush 20 milliLiter(s) IV Push once    MEDICATIONS  (PRN):  acetaminophen   Tablet. 325 milliGRAM(s) Oral every 4 hours PRN Moderate Pain (4 - 6)  ondansetron Injectable 4 milliGRAM(s) IV Push every 6 hours PRN Nausea and/or Vomiting  sodium chloride 0.9% lock flush 10 milliLiter(s) IV Push every 1 hour PRN After each medication administration  sodium chloride 0.9% lock flush 10 milliLiter(s) IV Push every 12 hours PRN Lumen of catheter NOT used      Weight: (11/13) 75.9kg  Daily     Daily     Weight Change: wt stable w/i +/- 1.5kg comparing to wt on 10/17: 74.6kg    Estimated energy needs: ibw: 52kg; 145%of IBW  increased needs per wound and pressure ulcer healing.   30-35 kcal/kg (8090-0329 kcal).   1.8-2 g/kg ( g protein).  30-35 mL/kg (5685-8361 mL fluid).       Subjective: 57 F s/p  SBR, fistula resection, esophagojejunostomy revision w/ post-op course complicated by RTOR for distal anastomosis breakdown, ATN, acute respiratory failure, & septic shock, MRSA bacteremia which resolved. Currently for wound care management; dressing clean and dry, wound has healthy granulation tissue per notes. pt NPO except ice chips and sips of water. TPN bag infusing at 73ml/hr providing 355g Dex, 140g AA, 50g 20% lipids (2267 kcal, 2.7g/kg IBW pro, GIR 3.30). Pt reports that she will be allowed to have purred food soon per MD; will follow on po intake and amount of calories received from po when feasible. Wt has been stable; please update wt weekly.     Previous Nutrition Diagnosis: Increased nutrient needs RT increased demand for nutrients AEB wound and pressure ulcer healing   Active [  X]  Resolved [   ]    If resolved, new PES:     Goal: Pt to meet >75% EER via tolerated route    Recommendations:  1) Continue w/ current TPN order as tolerated and assess feasibility of other routes w/wound healing  2) Monitor triglycerides and adjust lipids per MD discretion  3) Please continue to take standing weight for trending purposes    Education: Understands meeting nutrient needs via TPN.      Risk Level: High [  X ] Moderate [   ] Low [   ]

## 2017-11-22 NOTE — PROGRESS NOTE ADULT - SUBJECTIVE AND OBJECTIVE BOX
INTERVAL HPI/OVERNIGHT EVENTS:    ROS: No headache, dizziness, blurred vision, cough, chest pain, palpitations, SOB, abdominal pain, diarrhea, difficulty urinating.      ANTIBIOTICS/RELEVANT:    MEDICATIONS  (STANDING):  bismuth subsalicylate Liquid 30 milliLiter(s) Oral daily  calcitriol Injectable 1 MICROGram(s) IV Push <User Schedule>  collagenase Ointment 1 Application(s) Topical daily  cyanocobalamin Injectable 1000 MICROGram(s) SubCutaneous every 7 days  Dakins Solution - 1/2 Strength 1 Application(s) Topical daily  dextrose 5%. 1000 milliLiter(s) (50 mL/Hr) IV Continuous <Continuous>  dextrose 50% Injectable 25 Gram(s) IV Push once  dextrose 50% Injectable 12.5 Gram(s) IV Push once  diazepam    Tablet 5 milliGRAM(s) Oral at bedtime  diphenhydrAMINE   Injectable 50 milliGRAM(s) IV Push at bedtime  enoxaparin Injectable 30 milliGRAM(s) SubCutaneous two times a day  escitalopram 20 milliGRAM(s) Oral daily  fat emulsion (Plant Based) 20% IVPB 50 Gram(s) IV Intermittent once  gabapentin 100 milliGRAM(s) Oral two times a day  heparin  flush 100 Units/mL Injectable 100 Unit(s) IV Push every other day  insulin lispro (HumaLOG) corrective regimen sliding scale   SubCutaneous every 6 hours  lidocaine   Patch 1 Patch Transdermal daily  losartan 50 milliGRAM(s) Oral daily  nystatin Powder 1 Application(s) Topical two times a day  octreotide  Injectable 450 MICROGram(s) IV Push daily  oxybutynin 10 milliGRAM(s) Oral two times a day  pantoprazole  Injectable 40 milliGRAM(s) IV Push daily  Parenteral Nutrition - Adult 1 Each (73 mL/Hr) TPN Continuous <Continuous>  Parenteral Nutrition - Adult 1 Each (73 mL/Hr) TPN Continuous <Continuous>  sodium chloride 0.9% lock flush 20 milliLiter(s) IV Push once    MEDICATIONS  (PRN):  acetaminophen   Tablet. 325 milliGRAM(s) Oral every 4 hours PRN Moderate Pain (4 - 6)  ondansetron Injectable 4 milliGRAM(s) IV Push every 6 hours PRN Nausea and/or Vomiting  sodium chloride 0.9% lock flush 10 milliLiter(s) IV Push every 1 hour PRN After each medication administration  sodium chloride 0.9% lock flush 10 milliLiter(s) IV Push every 12 hours PRN Lumen of catheter NOT used        Vital Signs Last 24 Hrs  T(C): 36.2 (22 Nov 2017 10:30), Max: 37.9 (22 Nov 2017 05:08)  T(F): 97.2 (22 Nov 2017 10:30), Max: 100.2 (22 Nov 2017 05:08)  HR: 79 (22 Nov 2017 10:30) (74 - 96)  BP: 131/85 (22 Nov 2017 10:30) (120/77 - 145/88)  BP(mean): --  RR: 17 (22 Nov 2017 10:30) (16 - 17)  SpO2: 94% (22 Nov 2017 10:30) (94% - 95%)    11-21-17 @ 07:01  -  11-22-17 @ 07:00  --------------------------------------------------------  IN: 803 mL / OUT: 200 mL / NET: 603 mL      PHYSICAL EXAM:  Constitutional: Well-developed, well nourished  Eyes: PERRL, EOMI  Ear/Nose/Throat:  no oral lesion, no sinus tenderness on percussion	  Neck: no JVD, no lymphadenopathy, supple  Respiratory: CTA b/l, no wheezes, no crackles, no accessory muscle use  Cardiovascular: S1S2 RRR, no murmurs/rubs/gallops  Gastrointestinal: soft, NTND, (+) BS, no HSM  Extremities: WWP, no peripheral edema, no erythema, no cyanosis  Vascular: 2+ DP pulses b/l      LABS:                        10.1   7.5   )-----------( 275      ( 22 Nov 2017 12:31 )             33.1     11-22    138  |  99  |  34<H>  ----------------------------<  120<H>  4.0   |  30  |  0.62    Ca    9.9      22 Nov 2017 12:31  Phos  3.4     11-22  Mg     2.0     11-22            MICROBIOLOGY:    RADIOLOGY & ADDITIONAL STUDIES: INTERVAL HPI/OVERNIGHT EVENTS: BRAYDEN o/n    ROS: No headache, dizziness, blurred vision, cough, chest pain, palpitations, SOB, abdominal pain, diarrhea, difficulty urinating.      ANTIBIOTICS/RELEVANT:    MEDICATIONS  (STANDING):  bismuth subsalicylate Liquid 30 milliLiter(s) Oral daily  calcitriol Injectable 1 MICROGram(s) IV Push <User Schedule>  collagenase Ointment 1 Application(s) Topical daily  cyanocobalamin Injectable 1000 MICROGram(s) SubCutaneous every 7 days  Dakins Solution - 1/2 Strength 1 Application(s) Topical daily  dextrose 5%. 1000 milliLiter(s) (50 mL/Hr) IV Continuous <Continuous>  dextrose 50% Injectable 25 Gram(s) IV Push once  dextrose 50% Injectable 12.5 Gram(s) IV Push once  diazepam    Tablet 5 milliGRAM(s) Oral at bedtime  diphenhydrAMINE   Injectable 50 milliGRAM(s) IV Push at bedtime  enoxaparin Injectable 30 milliGRAM(s) SubCutaneous two times a day  escitalopram 20 milliGRAM(s) Oral daily  fat emulsion (Plant Based) 20% IVPB 50 Gram(s) IV Intermittent once  gabapentin 100 milliGRAM(s) Oral two times a day  heparin  flush 100 Units/mL Injectable 100 Unit(s) IV Push every other day  insulin lispro (HumaLOG) corrective regimen sliding scale   SubCutaneous every 6 hours  lidocaine   Patch 1 Patch Transdermal daily  losartan 50 milliGRAM(s) Oral daily  nystatin Powder 1 Application(s) Topical two times a day  octreotide  Injectable 450 MICROGram(s) IV Push daily  oxybutynin 10 milliGRAM(s) Oral two times a day  pantoprazole  Injectable 40 milliGRAM(s) IV Push daily  Parenteral Nutrition - Adult 1 Each (73 mL/Hr) TPN Continuous <Continuous>  Parenteral Nutrition - Adult 1 Each (73 mL/Hr) TPN Continuous <Continuous>  sodium chloride 0.9% lock flush 20 milliLiter(s) IV Push once    MEDICATIONS  (PRN):  acetaminophen   Tablet. 325 milliGRAM(s) Oral every 4 hours PRN Moderate Pain (4 - 6)  ondansetron Injectable 4 milliGRAM(s) IV Push every 6 hours PRN Nausea and/or Vomiting  sodium chloride 0.9% lock flush 10 milliLiter(s) IV Push every 1 hour PRN After each medication administration  sodium chloride 0.9% lock flush 10 milliLiter(s) IV Push every 12 hours PRN Lumen of catheter NOT used        Vital Signs Last 24 Hrs  T(C): 36.2 (22 Nov 2017 10:30), Max: 37.9 (22 Nov 2017 05:08)  T(F): 97.2 (22 Nov 2017 10:30), Max: 100.2 (22 Nov 2017 05:08)  HR: 79 (22 Nov 2017 10:30) (74 - 96)  BP: 131/85 (22 Nov 2017 10:30) (120/77 - 145/88)  BP(mean): --  RR: 17 (22 Nov 2017 10:30) (16 - 17)  SpO2: 94% (22 Nov 2017 10:30) (94% - 95%)    11-21-17 @ 07:01  -  11-22-17 @ 07:00  --------------------------------------------------------  IN: 803 mL / OUT: 200 mL / NET: 603 mL      PHYSICAL EXAM:  Constitutional: Obese, NAD  Eyes: PERRL, EOMI  Ear/Nose/Throat:  no oral lesion, no sinus tenderness on percussion	  Neck: no JVD, no lymphadenopathy, supple  Respiratory: CTA b/l, no wheezes, no crackles, no accessory muscle use  Cardiovascular: S1S2 RRR, no murmurs/rubs/gallops  Gastrointestinal: soft, NTND, (+) BS, no HSM. Clear dressing over abdominal wound. Gastric secretions visualized in bag.  Extremities: WWP, no peripheral edema, no erythema, no cyanosis  Vascular: 2+ DP pulses b/l      LABS:                        10.1   7.5   )-----------( 275      ( 22 Nov 2017 12:31 )             33.1     11-22    138  |  99  |  34<H>  ----------------------------<  120<H>  4.0   |  30  |  0.62    Ca    9.9      22 Nov 2017 12:31  Phos  3.4     11-22  Mg     2.0     11-22            MICROBIOLOGY:    RADIOLOGY & ADDITIONAL STUDIES:

## 2017-11-22 NOTE — PROGRESS NOTE ADULT - ATTENDING COMMENTS
ID Attending     Surprisingly, she has a recurrence of her MRSA bacteremia: both blood cultures (representing two separate venipunctures) are positive.    Previously, she received 1 month of vancomycin for presumed line sepsis (central line removed), and a TTE was negative.  Of note, the vanco ALISHA was 2    Now, she has a gr ii/vi systolic flow murmur at ULSB; no diastolic; fundoscopic exam is negative; there are no petechiae, splinters, Osler or Janeway lesions.    Impression: probable endovascular infection.    Recommendation:    1. ELODIA  2. Daptomycin 8 mg/kg q 24h  3. Line change next week

## 2017-11-22 NOTE — PROGRESS NOTE ADULT - ASSESSMENT
- the MRSA bacteremia was previously attributed to central line which has since been changed. Given that the MRSA has reoccurred, we are concerned for an endovascular infection.   - because of the borderline ALISHA for vancomycin from the previous MRSA (+) blood culture along with treatment failure, will recommend initiating treatment with IV Daptomycin 8mg/kg q24h.   - patient has had TTE during this admission; however, ELODIA never done. Recommend ELODIA to investigate possible endocarditis  - Patient will need central line changed.  - ID to follow #MRSA Bacteremia with Coag negative staphylococcus from central line  - the MRSA bacteremia was previously attributed to central line which has since been changed. Given that the MRSA has reoccurred, we are concerned for an endovascular infection.   - because of the borderline ALISHA for vancomycin from the previous MRSA (+) blood culture along with treatment failure, will recommend initiating treatment with IV Daptomycin 8mg/kg q24h.   - patient has had TTE during this admission; however, ELODIA never done. Recommend transesophageal echocardiogram (ELODIA) to investigate possible endocarditis  - Patient will need central line changed.  - ID to follow    Plan discussed with ID attending and primary team #MRSA Bacteremia with Coag negative staphylococcus from central line  - the MRSA bacteremia was previously attributed to central line which has since been changed. Given that the MRSA has reoccurred, we are concerned for an endovascular infection.   - because of the borderline ALISHA for vancomycin from the previous MRSA (+) blood culture along with treatment failure, will recommend initiating treatment with IV Daptomycin 8mg/kg q24h.   - patient has had TTE during this admission; however, ELODIA never done. Recommend transesophageal echocardiogram (ELODIA) to investigate possible endocarditis  - Patient will need central line changed after 5-7 days of daptomycin.  - ID to follow    Plan discussed with ID attending and primary team

## 2017-11-23 LAB
-  CEFAZOLIN: SIGNIFICANT CHANGE UP
-  CEFAZOLIN: SIGNIFICANT CHANGE UP
-  CLINDAMYCIN: SIGNIFICANT CHANGE UP
-  CLINDAMYCIN: SIGNIFICANT CHANGE UP
-  DAPTOMYCIN: SIGNIFICANT CHANGE UP
-  DAPTOMYCIN: SIGNIFICANT CHANGE UP
-  ERYTHROMYCIN: SIGNIFICANT CHANGE UP
-  ERYTHROMYCIN: SIGNIFICANT CHANGE UP
-  LINEZOLID: SIGNIFICANT CHANGE UP
-  LINEZOLID: SIGNIFICANT CHANGE UP
-  OXACILLIN: SIGNIFICANT CHANGE UP
-  OXACILLIN: SIGNIFICANT CHANGE UP
-  PENICILLIN: SIGNIFICANT CHANGE UP
-  PENICILLIN: SIGNIFICANT CHANGE UP
-  RIFAMPIN: SIGNIFICANT CHANGE UP
-  RIFAMPIN: SIGNIFICANT CHANGE UP
-  TETRACYCLINE: SIGNIFICANT CHANGE UP
-  TETRACYCLINE: SIGNIFICANT CHANGE UP
-  TRIMETHOPRIM/SULFAMETHOXAZOLE: SIGNIFICANT CHANGE UP
-  TRIMETHOPRIM/SULFAMETHOXAZOLE: SIGNIFICANT CHANGE UP
-  VANCOMYCIN: SIGNIFICANT CHANGE UP
-  VANCOMYCIN: SIGNIFICANT CHANGE UP
ANION GAP SERPL CALC-SCNC: 12 MMOL/L — SIGNIFICANT CHANGE UP (ref 5–17)
BUN SERPL-MCNC: 36 MG/DL — HIGH (ref 7–23)
CALCIUM SERPL-MCNC: 9.6 MG/DL — SIGNIFICANT CHANGE UP (ref 8.4–10.5)
CHLORIDE SERPL-SCNC: 100 MMOL/L — SIGNIFICANT CHANGE UP (ref 96–108)
CO2 SERPL-SCNC: 28 MMOL/L — SIGNIFICANT CHANGE UP (ref 22–31)
CREAT SERPL-MCNC: 0.57 MG/DL — SIGNIFICANT CHANGE UP (ref 0.5–1.3)
GLUCOSE BLDC GLUCOMTR-MCNC: 108 MG/DL — HIGH (ref 70–99)
GLUCOSE BLDC GLUCOMTR-MCNC: 124 MG/DL — HIGH (ref 70–99)
GLUCOSE BLDC GLUCOMTR-MCNC: 127 MG/DL — HIGH (ref 70–99)
GLUCOSE BLDC GLUCOMTR-MCNC: 128 MG/DL — HIGH (ref 70–99)
GLUCOSE BLDC GLUCOMTR-MCNC: 151 MG/DL — HIGH (ref 70–99)
GLUCOSE SERPL-MCNC: 129 MG/DL — HIGH (ref 70–99)
HCT VFR BLD CALC: 32.7 % — LOW (ref 34.5–45)
HGB BLD-MCNC: 9.8 G/DL — LOW (ref 11.5–15.5)
MAGNESIUM SERPL-MCNC: 2 MG/DL — SIGNIFICANT CHANGE UP (ref 1.6–2.6)
MCHC RBC-ENTMCNC: 27 PG — SIGNIFICANT CHANGE UP (ref 27–34)
MCHC RBC-ENTMCNC: 30 G/DL — LOW (ref 32–36)
MCV RBC AUTO: 90.1 FL — SIGNIFICANT CHANGE UP (ref 80–100)
METHOD TYPE: SIGNIFICANT CHANGE UP
PHOSPHATE SERPL-MCNC: 3.5 MG/DL — SIGNIFICANT CHANGE UP (ref 2.5–4.5)
PLATELET # BLD AUTO: 278 K/UL — SIGNIFICANT CHANGE UP (ref 150–400)
POTASSIUM SERPL-MCNC: 4.1 MMOL/L — SIGNIFICANT CHANGE UP (ref 3.5–5.3)
POTASSIUM SERPL-SCNC: 4.1 MMOL/L — SIGNIFICANT CHANGE UP (ref 3.5–5.3)
RBC # BLD: 3.63 M/UL — LOW (ref 3.8–5.2)
RBC # FLD: 16.1 % — SIGNIFICANT CHANGE UP (ref 10.3–16.9)
SODIUM SERPL-SCNC: 140 MMOL/L — SIGNIFICANT CHANGE UP (ref 135–145)
WBC # BLD: 5.8 K/UL — SIGNIFICANT CHANGE UP (ref 3.8–10.5)
WBC # FLD AUTO: 5.8 K/UL — SIGNIFICANT CHANGE UP (ref 3.8–10.5)

## 2017-11-23 RX ORDER — KETOROLAC TROMETHAMINE 30 MG/ML
15 SYRINGE (ML) INJECTION ONCE
Qty: 0 | Refills: 0 | Status: DISCONTINUED | OUTPATIENT
Start: 2017-11-23 | End: 2017-11-23

## 2017-11-23 RX ORDER — ELECTROLYTE SOLUTION,INJ
1 VIAL (ML) INTRAVENOUS
Qty: 0 | Refills: 0 | Status: DISCONTINUED | OUTPATIENT
Start: 2017-11-23 | End: 2017-11-23

## 2017-11-23 RX ORDER — ACETAMINOPHEN 500 MG
1000 TABLET ORAL ONCE
Qty: 0 | Refills: 0 | Status: COMPLETED | OUTPATIENT
Start: 2017-11-23 | End: 2017-11-23

## 2017-11-23 RX ADMIN — Medication 10 MILLIGRAM(S): at 17:09

## 2017-11-23 RX ADMIN — Medication 325 MILLIGRAM(S): at 04:22

## 2017-11-23 RX ADMIN — Medication 325 MILLIGRAM(S): at 12:16

## 2017-11-23 RX ADMIN — Medication 1 APPLICATION(S): at 13:00

## 2017-11-23 RX ADMIN — PANTOPRAZOLE SODIUM 40 MILLIGRAM(S): 20 TABLET, DELAYED RELEASE ORAL at 05:47

## 2017-11-23 RX ADMIN — LOSARTAN POTASSIUM 50 MILLIGRAM(S): 100 TABLET, FILM COATED ORAL at 05:47

## 2017-11-23 RX ADMIN — Medication 325 MILLIGRAM(S): at 05:20

## 2017-11-23 RX ADMIN — Medication 15 MILLIGRAM(S): at 15:26

## 2017-11-23 RX ADMIN — Medication 15 MILLIGRAM(S): at 15:11

## 2017-11-23 RX ADMIN — ESCITALOPRAM OXALATE 20 MILLIGRAM(S): 10 TABLET, FILM COATED ORAL at 21:49

## 2017-11-23 RX ADMIN — Medication 325 MILLIGRAM(S): at 16:22

## 2017-11-23 RX ADMIN — Medication 1000 MILLIGRAM(S): at 23:00

## 2017-11-23 RX ADMIN — Medication 325 MILLIGRAM(S): at 15:52

## 2017-11-23 RX ADMIN — GABAPENTIN 100 MILLIGRAM(S): 400 CAPSULE ORAL at 05:47

## 2017-11-23 RX ADMIN — Medication 10 MILLIGRAM(S): at 05:47

## 2017-11-23 RX ADMIN — ONDANSETRON 4 MILLIGRAM(S): 8 TABLET, FILM COATED ORAL at 18:56

## 2017-11-23 RX ADMIN — Medication 325 MILLIGRAM(S): at 11:46

## 2017-11-23 RX ADMIN — Medication 325 MILLIGRAM(S): at 21:49

## 2017-11-23 RX ADMIN — Medication 50 MILLIGRAM(S): at 21:49

## 2017-11-23 RX ADMIN — DAPTOMYCIN 132 MILLIGRAM(S): 500 INJECTION, POWDER, LYOPHILIZED, FOR SOLUTION INTRAVENOUS at 17:09

## 2017-11-23 RX ADMIN — Medication 1 EACH: at 17:09

## 2017-11-23 RX ADMIN — ONDANSETRON 4 MILLIGRAM(S): 8 TABLET, FILM COATED ORAL at 04:22

## 2017-11-23 RX ADMIN — LIDOCAINE 1 PATCH: 4 CREAM TOPICAL at 00:05

## 2017-11-23 RX ADMIN — LIDOCAINE 1 PATCH: 4 CREAM TOPICAL at 11:44

## 2017-11-23 RX ADMIN — ENOXAPARIN SODIUM 30 MILLIGRAM(S): 100 INJECTION SUBCUTANEOUS at 05:47

## 2017-11-23 RX ADMIN — Medication 2: at 00:17

## 2017-11-23 RX ADMIN — Medication 1 APPLICATION(S): at 05:47

## 2017-11-23 RX ADMIN — Medication 325 MILLIGRAM(S): at 22:46

## 2017-11-23 RX ADMIN — GABAPENTIN 100 MILLIGRAM(S): 400 CAPSULE ORAL at 17:09

## 2017-11-23 RX ADMIN — NYSTATIN CREAM 1 APPLICATION(S): 100000 CREAM TOPICAL at 05:47

## 2017-11-23 RX ADMIN — NYSTATIN CREAM 1 APPLICATION(S): 100000 CREAM TOPICAL at 17:10

## 2017-11-23 RX ADMIN — ENOXAPARIN SODIUM 30 MILLIGRAM(S): 100 INJECTION SUBCUTANEOUS at 17:09

## 2017-11-23 RX ADMIN — OCTREOTIDE ACETATE 450 MICROGRAM(S): 200 INJECTION, SOLUTION INTRAVENOUS; SUBCUTANEOUS at 11:46

## 2017-11-23 RX ADMIN — Medication 400 MILLIGRAM(S): at 22:36

## 2017-11-23 RX ADMIN — Medication 5 MILLIGRAM(S): at 21:48

## 2017-11-23 RX ADMIN — LIDOCAINE 1 PATCH: 4 CREAM TOPICAL at 22:20

## 2017-11-23 NOTE — PROGRESS NOTE ADULT - ASSESSMENT
57 F s/p  SBR, fistula resection, esophagojejunostomy revision w/ post-op course complicated by RTOR for distal anastomosis breakdown, ATN, acute respiratory failure, & septic shock, MRSA bacteremia which resolved. Currently for wound care management.   NPO with sips and chips /TPN/IVF   Pain/nausea control - only benadryl at midnight and dilaudid 0.25mg during PT session   ISS/OOB with PT daily  Nystatin powder, vancomycin 750 Q12 (10/11-11/8) ( Daptomycin 11/22-)  SCDs, lovenox, OOBA  Lines :  L left subclavian line (10/4-- )  AM labs, weekly albumin, pre-albumin and triglyceride (every friday)  Wet to dry dressing in place  NGT in fistula

## 2017-11-23 NOTE — PROGRESS NOTE ADULT - SUBJECTIVE AND OBJECTIVE BOX
O/N: BRAYDEN, VSS  11/22 : ID recs ELODIA, Daptomycin 8mg/kg q24h     STATUS POST  7/24: SBR resection, fistula resection, revision of esophagogegunostomy drain through esophageal hiatus in R mediastinum  7/29: Ex-lap, SB anastamosis in BP limb breakdown with succus throughout abdomen  8/1: abd washout, drainage of abscess, biologic mesh placement, closure of abdominal wall, vac and retention suture  8/7: abd washout, mesh removal, frozen abd noted, LLQ CHECO replaced with benson drain  8/10: leak noted from previous anastamosis site on L side, mid abdomen malecot drain placed in enterotomy, JPs x 2 placed, necrotic fascia debrided, vicryl mesh sutured to fascia circumferentially, xeroform over mesh then vac dressing placed O/N: BRAYDEN, VSS  11/22 : ID recs ELODIA, Daptomycin 8mg/kg q24h     STATUS POST  7/24: SBR resection, fistula resection, revision of esophagogegunostomy drain through esophageal hiatus in R mediastinum  7/29: Ex-lap, SB anastamosis in BP limb breakdown with succus throughout abdomen  8/1: abd washout, drainage of abscess, biologic mesh placement, closure of abdominal wall, vac and retention suture  8/7: abd washout, mesh removal, frozen abd noted, LLQ CHECO replaced with benson drain  8/10: leak noted from previous anastamosis site on L side, mid abdomen malecot drain placed in enterotomy, JPs x 2 placed, necrotic fascia debrided, vicryl mesh sutured to fascia circumferentially, xeroform over mesh then vac dressing placed	      Vital Signs Last 24 Hrs  T(C): 36 (23 Nov 2017 05:58), Max: 37.1 (22 Nov 2017 17:19)  T(F): 96.8 (23 Nov 2017 05:58), Max: 98.7 (22 Nov 2017 17:19)  HR: 72 (23 Nov 2017 05:58) (72 - 87)  BP: 150/93 (23 Nov 2017 05:58) (125/86 - 150/93)  BP(mean): --  RR: 18 (23 Nov 2017 05:58) (16 - 18)  SpO2: 93% (22 Nov 2017 20:28) (90% - 95%)    I&O's Summary    22 Nov 2017 07:01  -  23 Nov 2017 07:00  --------------------------------------------------------  IN: 1992.8 mL / OUT: 190 mL / NET: 1802.8 mL    23 Nov 2017 07:01  -  23 Nov 2017 08:15  --------------------------------------------------------  IN: 73 mL / OUT: 0 mL / NET: 73 mL

## 2017-11-24 LAB
ALBUMIN SERPL ELPH-MCNC: 3 G/DL — LOW (ref 3.3–5)
ALP SERPL-CCNC: 284 U/L — HIGH (ref 40–120)
ALT FLD-CCNC: 55 U/L — HIGH (ref 10–45)
ANION GAP SERPL CALC-SCNC: 10 MMOL/L — SIGNIFICANT CHANGE UP (ref 5–17)
AST SERPL-CCNC: 35 U/L — SIGNIFICANT CHANGE UP (ref 10–40)
BILIRUB SERPL-MCNC: 0.6 MG/DL — SIGNIFICANT CHANGE UP (ref 0.2–1.2)
BUN SERPL-MCNC: 41 MG/DL — HIGH (ref 7–23)
CALCIUM SERPL-MCNC: 9.8 MG/DL — SIGNIFICANT CHANGE UP (ref 8.4–10.5)
CHLORIDE SERPL-SCNC: 104 MMOL/L — SIGNIFICANT CHANGE UP (ref 96–108)
CO2 SERPL-SCNC: 29 MMOL/L — SIGNIFICANT CHANGE UP (ref 22–31)
CREAT SERPL-MCNC: 0.66 MG/DL — SIGNIFICANT CHANGE UP (ref 0.5–1.3)
GLUCOSE BLDC GLUCOMTR-MCNC: 114 MG/DL — HIGH (ref 70–99)
GLUCOSE BLDC GLUCOMTR-MCNC: 118 MG/DL — HIGH (ref 70–99)
GLUCOSE BLDC GLUCOMTR-MCNC: 120 MG/DL — HIGH (ref 70–99)
GLUCOSE BLDC GLUCOMTR-MCNC: 131 MG/DL — HIGH (ref 70–99)
GLUCOSE SERPL-MCNC: 99 MG/DL — SIGNIFICANT CHANGE UP (ref 70–99)
HCT VFR BLD CALC: 33.4 % — LOW (ref 34.5–45)
HGB BLD-MCNC: 10 G/DL — LOW (ref 11.5–15.5)
MAGNESIUM SERPL-MCNC: 2.3 MG/DL — SIGNIFICANT CHANGE UP (ref 1.6–2.6)
MCHC RBC-ENTMCNC: 27.1 PG — SIGNIFICANT CHANGE UP (ref 27–34)
MCHC RBC-ENTMCNC: 29.9 G/DL — LOW (ref 32–36)
MCV RBC AUTO: 90.5 FL — SIGNIFICANT CHANGE UP (ref 80–100)
PHOSPHATE SERPL-MCNC: 3.8 MG/DL — SIGNIFICANT CHANGE UP (ref 2.5–4.5)
PLATELET # BLD AUTO: 284 K/UL — SIGNIFICANT CHANGE UP (ref 150–400)
POTASSIUM SERPL-MCNC: 4.2 MMOL/L — SIGNIFICANT CHANGE UP (ref 3.5–5.3)
POTASSIUM SERPL-SCNC: 4.2 MMOL/L — SIGNIFICANT CHANGE UP (ref 3.5–5.3)
PROT SERPL-MCNC: 7.2 G/DL — SIGNIFICANT CHANGE UP (ref 6–8.3)
RBC # BLD: 3.69 M/UL — LOW (ref 3.8–5.2)
RBC # FLD: 16.3 % — SIGNIFICANT CHANGE UP (ref 10.3–16.9)
SODIUM SERPL-SCNC: 143 MMOL/L — SIGNIFICANT CHANGE UP (ref 135–145)
WBC # BLD: 5.5 K/UL — SIGNIFICANT CHANGE UP (ref 3.8–10.5)
WBC # FLD AUTO: 5.5 K/UL — SIGNIFICANT CHANGE UP (ref 3.8–10.5)

## 2017-11-24 RX ORDER — DIAZEPAM 5 MG
5 TABLET ORAL AT BEDTIME
Qty: 0 | Refills: 0 | Status: DISCONTINUED | OUTPATIENT
Start: 2017-11-24 | End: 2017-12-01

## 2017-11-24 RX ORDER — ELECTROLYTE SOLUTION,INJ
1 VIAL (ML) INTRAVENOUS
Qty: 0 | Refills: 0 | Status: DISCONTINUED | OUTPATIENT
Start: 2017-11-24 | End: 2017-11-24

## 2017-11-24 RX ORDER — I.V. FAT EMULSION 20 G/100ML
0.5 EMULSION INTRAVENOUS
Qty: 50 | Refills: 0 | Status: DISCONTINUED | OUTPATIENT
Start: 2017-11-24 | End: 2017-11-24

## 2017-11-24 RX ORDER — ACETAMINOPHEN 500 MG
1000 TABLET ORAL ONCE
Qty: 0 | Refills: 0 | Status: COMPLETED | OUTPATIENT
Start: 2017-11-24 | End: 2017-11-24

## 2017-11-24 RX ADMIN — LIDOCAINE 1 PATCH: 4 CREAM TOPICAL at 11:06

## 2017-11-24 RX ADMIN — CALCITRIOL 1 MICROGRAM(S): 0.5 CAPSULE ORAL at 11:07

## 2017-11-24 RX ADMIN — LIDOCAINE 1 PATCH: 4 CREAM TOPICAL at 22:21

## 2017-11-24 RX ADMIN — Medication 100 UNIT(S): at 11:07

## 2017-11-24 RX ADMIN — Medication 325 MILLIGRAM(S): at 07:43

## 2017-11-24 RX ADMIN — NYSTATIN CREAM 1 APPLICATION(S): 100000 CREAM TOPICAL at 06:58

## 2017-11-24 RX ADMIN — Medication 400 MILLIGRAM(S): at 17:48

## 2017-11-24 RX ADMIN — Medication 50 MILLIGRAM(S): at 21:05

## 2017-11-24 RX ADMIN — Medication 325 MILLIGRAM(S): at 06:43

## 2017-11-24 RX ADMIN — LOSARTAN POTASSIUM 50 MILLIGRAM(S): 100 TABLET, FILM COATED ORAL at 06:43

## 2017-11-24 RX ADMIN — Medication 325 MILLIGRAM(S): at 11:37

## 2017-11-24 RX ADMIN — PANTOPRAZOLE SODIUM 40 MILLIGRAM(S): 20 TABLET, DELAYED RELEASE ORAL at 06:44

## 2017-11-24 RX ADMIN — I.V. FAT EMULSION 20.8 GM/KG/DAY: 20 EMULSION INTRAVENOUS at 22:12

## 2017-11-24 RX ADMIN — ESCITALOPRAM OXALATE 20 MILLIGRAM(S): 10 TABLET, FILM COATED ORAL at 21:05

## 2017-11-24 RX ADMIN — OCTREOTIDE ACETATE 450 MICROGRAM(S): 200 INJECTION, SOLUTION INTRAVENOUS; SUBCUTANEOUS at 11:16

## 2017-11-24 RX ADMIN — ENOXAPARIN SODIUM 30 MILLIGRAM(S): 100 INJECTION SUBCUTANEOUS at 17:18

## 2017-11-24 RX ADMIN — GABAPENTIN 100 MILLIGRAM(S): 400 CAPSULE ORAL at 17:18

## 2017-11-24 RX ADMIN — Medication 73 EACH: at 17:29

## 2017-11-24 RX ADMIN — DAPTOMYCIN 132 MILLIGRAM(S): 500 INJECTION, POWDER, LYOPHILIZED, FOR SOLUTION INTRAVENOUS at 17:18

## 2017-11-24 RX ADMIN — GABAPENTIN 100 MILLIGRAM(S): 400 CAPSULE ORAL at 06:43

## 2017-11-24 RX ADMIN — ENOXAPARIN SODIUM 30 MILLIGRAM(S): 100 INJECTION SUBCUTANEOUS at 06:44

## 2017-11-24 RX ADMIN — Medication 10 MILLIGRAM(S): at 17:18

## 2017-11-24 RX ADMIN — Medication 10 MILLIGRAM(S): at 06:43

## 2017-11-24 RX ADMIN — Medication 1 APPLICATION(S): at 06:58

## 2017-11-24 RX ADMIN — Medication 1000 MILLIGRAM(S): at 18:18

## 2017-11-24 RX ADMIN — Medication 325 MILLIGRAM(S): at 11:07

## 2017-11-24 RX ADMIN — NYSTATIN CREAM 1 APPLICATION(S): 100000 CREAM TOPICAL at 17:20

## 2017-11-24 RX ADMIN — Medication 5 MILLIGRAM(S): at 21:05

## 2017-11-25 LAB
ALBUMIN SERPL ELPH-MCNC: 3.1 G/DL — LOW (ref 3.3–5)
ALP SERPL-CCNC: 299 U/L — HIGH (ref 40–120)
ALT FLD-CCNC: 62 U/L — HIGH (ref 10–45)
ANION GAP SERPL CALC-SCNC: 13 MMOL/L — SIGNIFICANT CHANGE UP (ref 5–17)
AST SERPL-CCNC: 36 U/L — SIGNIFICANT CHANGE UP (ref 10–40)
BILIRUB SERPL-MCNC: 0.5 MG/DL — SIGNIFICANT CHANGE UP (ref 0.2–1.2)
BUN SERPL-MCNC: 33 MG/DL — HIGH (ref 7–23)
CALCIUM SERPL-MCNC: 9.7 MG/DL — SIGNIFICANT CHANGE UP (ref 8.4–10.5)
CHLORIDE SERPL-SCNC: 101 MMOL/L — SIGNIFICANT CHANGE UP (ref 96–108)
CO2 SERPL-SCNC: 26 MMOL/L — SIGNIFICANT CHANGE UP (ref 22–31)
CREAT SERPL-MCNC: 0.5 MG/DL — SIGNIFICANT CHANGE UP (ref 0.5–1.3)
GLUCOSE BLDC GLUCOMTR-MCNC: 103 MG/DL — HIGH (ref 70–99)
GLUCOSE BLDC GLUCOMTR-MCNC: 103 MG/DL — HIGH (ref 70–99)
GLUCOSE BLDC GLUCOMTR-MCNC: 122 MG/DL — HIGH (ref 70–99)
GLUCOSE BLDC GLUCOMTR-MCNC: 122 MG/DL — HIGH (ref 70–99)
GLUCOSE BLDC GLUCOMTR-MCNC: 128 MG/DL — HIGH (ref 70–99)
GLUCOSE SERPL-MCNC: 126 MG/DL — HIGH (ref 70–99)
HCT VFR BLD CALC: 32.1 % — LOW (ref 34.5–45)
HGB BLD-MCNC: 9.8 G/DL — LOW (ref 11.5–15.5)
MAGNESIUM SERPL-MCNC: 2 MG/DL — SIGNIFICANT CHANGE UP (ref 1.6–2.6)
MCHC RBC-ENTMCNC: 27.3 PG — SIGNIFICANT CHANGE UP (ref 27–34)
MCHC RBC-ENTMCNC: 30.5 G/DL — LOW (ref 32–36)
MCV RBC AUTO: 89.4 FL — SIGNIFICANT CHANGE UP (ref 80–100)
PHOSPHATE SERPL-MCNC: 3.4 MG/DL — SIGNIFICANT CHANGE UP (ref 2.5–4.5)
PLATELET # BLD AUTO: 306 K/UL — SIGNIFICANT CHANGE UP (ref 150–400)
POTASSIUM SERPL-MCNC: 4.1 MMOL/L — SIGNIFICANT CHANGE UP (ref 3.5–5.3)
POTASSIUM SERPL-SCNC: 4.1 MMOL/L — SIGNIFICANT CHANGE UP (ref 3.5–5.3)
PROT SERPL-MCNC: 7 G/DL — SIGNIFICANT CHANGE UP (ref 6–8.3)
RBC # BLD: 3.59 M/UL — LOW (ref 3.8–5.2)
RBC # FLD: 16.2 % — SIGNIFICANT CHANGE UP (ref 10.3–16.9)
SODIUM SERPL-SCNC: 140 MMOL/L — SIGNIFICANT CHANGE UP (ref 135–145)
WBC # BLD: 6.4 K/UL — SIGNIFICANT CHANGE UP (ref 3.8–10.5)
WBC # FLD AUTO: 6.4 K/UL — SIGNIFICANT CHANGE UP (ref 3.8–10.5)

## 2017-11-25 RX ORDER — ACETAMINOPHEN 500 MG
1000 TABLET ORAL ONCE
Qty: 0 | Refills: 0 | Status: COMPLETED | OUTPATIENT
Start: 2017-11-25 | End: 2017-11-25

## 2017-11-25 RX ORDER — ELECTROLYTE SOLUTION,INJ
1 VIAL (ML) INTRAVENOUS
Qty: 0 | Refills: 0 | Status: DISCONTINUED | OUTPATIENT
Start: 2017-11-25 | End: 2017-11-25

## 2017-11-25 RX ADMIN — Medication 325 MILLIGRAM(S): at 03:10

## 2017-11-25 RX ADMIN — Medication 1000 MILLIGRAM(S): at 21:00

## 2017-11-25 RX ADMIN — NYSTATIN CREAM 1 APPLICATION(S): 100000 CREAM TOPICAL at 05:43

## 2017-11-25 RX ADMIN — Medication 10 MILLIGRAM(S): at 05:42

## 2017-11-25 RX ADMIN — GABAPENTIN 100 MILLIGRAM(S): 400 CAPSULE ORAL at 05:42

## 2017-11-25 RX ADMIN — Medication 325 MILLIGRAM(S): at 14:41

## 2017-11-25 RX ADMIN — LOSARTAN POTASSIUM 50 MILLIGRAM(S): 100 TABLET, FILM COATED ORAL at 05:43

## 2017-11-25 RX ADMIN — Medication 400 MILLIGRAM(S): at 20:46

## 2017-11-25 RX ADMIN — Medication 1 EACH: at 17:18

## 2017-11-25 RX ADMIN — Medication 325 MILLIGRAM(S): at 06:40

## 2017-11-25 RX ADMIN — Medication 50 MILLIGRAM(S): at 21:02

## 2017-11-25 RX ADMIN — DAPTOMYCIN 132 MILLIGRAM(S): 500 INJECTION, POWDER, LYOPHILIZED, FOR SOLUTION INTRAVENOUS at 17:18

## 2017-11-25 RX ADMIN — Medication 5 MILLIGRAM(S): at 21:03

## 2017-11-25 RX ADMIN — ESCITALOPRAM OXALATE 20 MILLIGRAM(S): 10 TABLET, FILM COATED ORAL at 21:02

## 2017-11-25 RX ADMIN — NYSTATIN CREAM 1 APPLICATION(S): 100000 CREAM TOPICAL at 17:19

## 2017-11-25 RX ADMIN — Medication 325 MILLIGRAM(S): at 02:13

## 2017-11-25 RX ADMIN — OCTREOTIDE ACETATE 450 MICROGRAM(S): 200 INJECTION, SOLUTION INTRAVENOUS; SUBCUTANEOUS at 12:33

## 2017-11-25 RX ADMIN — Medication 10 MILLIGRAM(S): at 17:36

## 2017-11-25 RX ADMIN — Medication 325 MILLIGRAM(S): at 07:40

## 2017-11-25 RX ADMIN — ENOXAPARIN SODIUM 30 MILLIGRAM(S): 100 INJECTION SUBCUTANEOUS at 05:42

## 2017-11-25 RX ADMIN — GABAPENTIN 100 MILLIGRAM(S): 400 CAPSULE ORAL at 17:18

## 2017-11-25 RX ADMIN — Medication 1 APPLICATION(S): at 05:42

## 2017-11-25 RX ADMIN — PANTOPRAZOLE SODIUM 40 MILLIGRAM(S): 20 TABLET, DELAYED RELEASE ORAL at 05:41

## 2017-11-25 RX ADMIN — ONDANSETRON 4 MILLIGRAM(S): 8 TABLET, FILM COATED ORAL at 21:02

## 2017-11-25 RX ADMIN — ONDANSETRON 4 MILLIGRAM(S): 8 TABLET, FILM COATED ORAL at 14:41

## 2017-11-25 RX ADMIN — ENOXAPARIN SODIUM 30 MILLIGRAM(S): 100 INJECTION SUBCUTANEOUS at 17:18

## 2017-11-25 NOTE — PROGRESS NOTE ADULT - SUBJECTIVE AND OBJECTIVE BOX
O/N: BRAYDEN, VSS  11/24: BRAYDEN, ID recommended ELODIA     STATUS POST:   7/24: SBR resection, fistula resection, revision of esophagogegunostomy drain through esophageal hiatus in R mediastinum  7/29: Ex-lap, SB anastamosis in BP limb breakdown with succus throughout abdomen  8/1: abd washout, drainage of abscess, biologic mesh placement, closure of abdominal wall, vac and retention suture  8/7: abd washout, mesh removal, frozen abd noted, LLQ CHECO replaced with benson drain  8/10: leak noted from previous anastamosis site on L side, mid abdomen malecot drain placed in enterotomy, JPs x 2 placed, necrotic fascia debrided, vicryl mesh sutured to fascia circumferentially, xeroform over mesh then vac dressing placed O/N: BRAYDEN, VSS  11/24: BRAYDEN, ID recommended ELODIA     STATUS POST:   7/24: SBR resection, fistula resection, revision of esophagogegunostomy drain through esophageal hiatus in R mediastinum  7/29: Ex-lap, SB anastamosis in BP limb breakdown with succus throughout abdomen  8/1: abd washout, drainage of abscess, biologic mesh placement, closure of abdominal wall, vac and retention suture  8/7: abd washout, mesh removal, frozen abd noted, LLQ CHECO replaced with benson drain  8/10: leak noted from previous anastamosis site on L side, mid abdomen malecot drain placed in enterotomy, JPs x 2 placed, necrotic fascia debrided, vicryl mesh sutured to fascia circumferentially, xeroform over mesh then vac dressing placed	      Vital Signs Last 24 Hrs  T(C): 36.7 (25 Nov 2017 06:05), Max: 36.7 (25 Nov 2017 06:05)  T(F): 98.1 (25 Nov 2017 06:05), Max: 98.1 (25 Nov 2017 06:05)  HR: 70 (25 Nov 2017 06:05) (68 - 81)  BP: 171/111 (25 Nov 2017 06:05) (140/80 - 171/111)  BP(mean): --  RR: 16 (25 Nov 2017 06:05) (16 - 17)  SpO2: 94% (25 Nov 2017 06:05) (93% - 95%)      I&O's Summary    24 Nov 2017 07:01  -  25 Nov 2017 07:00  --------------------------------------------------------  IN: 1992 mL / OUT: 0 mL / NET: 1992 mL      Gen: NAD   Abd: soft, nt / nd   dressing in place

## 2017-11-26 LAB
ANION GAP SERPL CALC-SCNC: 12 MMOL/L — SIGNIFICANT CHANGE UP (ref 5–17)
BUN SERPL-MCNC: 38 MG/DL — HIGH (ref 7–23)
CALCIUM SERPL-MCNC: 9.6 MG/DL — SIGNIFICANT CHANGE UP (ref 8.4–10.5)
CHLORIDE SERPL-SCNC: 103 MMOL/L — SIGNIFICANT CHANGE UP (ref 96–108)
CO2 SERPL-SCNC: 25 MMOL/L — SIGNIFICANT CHANGE UP (ref 22–31)
CREAT SERPL-MCNC: 0.57 MG/DL — SIGNIFICANT CHANGE UP (ref 0.5–1.3)
CULTURE RESULTS: SIGNIFICANT CHANGE UP
CULTURE RESULTS: SIGNIFICANT CHANGE UP
GLUCOSE BLDC GLUCOMTR-MCNC: 110 MG/DL — HIGH (ref 70–99)
GLUCOSE BLDC GLUCOMTR-MCNC: 122 MG/DL — HIGH (ref 70–99)
GLUCOSE BLDC GLUCOMTR-MCNC: 128 MG/DL — HIGH (ref 70–99)
GLUCOSE BLDC GLUCOMTR-MCNC: 131 MG/DL — HIGH (ref 70–99)
GLUCOSE BLDC GLUCOMTR-MCNC: 80 MG/DL — SIGNIFICANT CHANGE UP (ref 70–99)
GLUCOSE SERPL-MCNC: 126 MG/DL — HIGH (ref 70–99)
HCT VFR BLD CALC: 33.5 % — LOW (ref 34.5–45)
HGB BLD-MCNC: 10.1 G/DL — LOW (ref 11.5–15.5)
MAGNESIUM SERPL-MCNC: 2.1 MG/DL — SIGNIFICANT CHANGE UP (ref 1.6–2.6)
MCHC RBC-ENTMCNC: 27.4 PG — SIGNIFICANT CHANGE UP (ref 27–34)
MCHC RBC-ENTMCNC: 30.1 G/DL — LOW (ref 32–36)
MCV RBC AUTO: 90.8 FL — SIGNIFICANT CHANGE UP (ref 80–100)
ORGANISM # SPEC MICROSCOPIC CNT: SIGNIFICANT CHANGE UP
PHOSPHATE SERPL-MCNC: 3.4 MG/DL — SIGNIFICANT CHANGE UP (ref 2.5–4.5)
PLATELET # BLD AUTO: 288 K/UL — SIGNIFICANT CHANGE UP (ref 150–400)
POTASSIUM SERPL-MCNC: 4.4 MMOL/L — SIGNIFICANT CHANGE UP (ref 3.5–5.3)
POTASSIUM SERPL-SCNC: 4.4 MMOL/L — SIGNIFICANT CHANGE UP (ref 3.5–5.3)
RBC # BLD: 3.69 M/UL — LOW (ref 3.8–5.2)
RBC # FLD: 16 % — SIGNIFICANT CHANGE UP (ref 10.3–16.9)
SODIUM SERPL-SCNC: 140 MMOL/L — SIGNIFICANT CHANGE UP (ref 135–145)
SPECIMEN SOURCE: SIGNIFICANT CHANGE UP
SPECIMEN SOURCE: SIGNIFICANT CHANGE UP
WBC # BLD: 5.6 K/UL — SIGNIFICANT CHANGE UP (ref 3.8–10.5)
WBC # FLD AUTO: 5.6 K/UL — SIGNIFICANT CHANGE UP (ref 3.8–10.5)

## 2017-11-26 RX ORDER — ELECTROLYTE SOLUTION,INJ
1 VIAL (ML) INTRAVENOUS
Qty: 0 | Refills: 0 | Status: DISCONTINUED | OUTPATIENT
Start: 2017-11-26 | End: 2017-11-26

## 2017-11-26 RX ORDER — I.V. FAT EMULSION 20 G/100ML
0.51 EMULSION INTRAVENOUS
Qty: 50 | Refills: 0 | Status: DISCONTINUED | OUTPATIENT
Start: 2017-11-26 | End: 2017-11-26

## 2017-11-26 RX ADMIN — GABAPENTIN 100 MILLIGRAM(S): 400 CAPSULE ORAL at 05:13

## 2017-11-26 RX ADMIN — Medication 325 MILLIGRAM(S): at 05:14

## 2017-11-26 RX ADMIN — I.V. FAT EMULSION 20.83 GM/KG/DAY: 20 EMULSION INTRAVENOUS at 21:59

## 2017-11-26 RX ADMIN — Medication 325 MILLIGRAM(S): at 12:59

## 2017-11-26 RX ADMIN — OCTREOTIDE ACETATE 450 MICROGRAM(S): 200 INJECTION, SOLUTION INTRAVENOUS; SUBCUTANEOUS at 12:00

## 2017-11-26 RX ADMIN — ONDANSETRON 4 MILLIGRAM(S): 8 TABLET, FILM COATED ORAL at 05:14

## 2017-11-26 RX ADMIN — Medication 325 MILLIGRAM(S): at 06:14

## 2017-11-26 RX ADMIN — ENOXAPARIN SODIUM 30 MILLIGRAM(S): 100 INJECTION SUBCUTANEOUS at 18:05

## 2017-11-26 RX ADMIN — Medication 1 EACH: at 18:04

## 2017-11-26 RX ADMIN — Medication 325 MILLIGRAM(S): at 19:04

## 2017-11-26 RX ADMIN — Medication 325 MILLIGRAM(S): at 18:04

## 2017-11-26 RX ADMIN — GABAPENTIN 100 MILLIGRAM(S): 400 CAPSULE ORAL at 18:05

## 2017-11-26 RX ADMIN — ESCITALOPRAM OXALATE 20 MILLIGRAM(S): 10 TABLET, FILM COATED ORAL at 21:55

## 2017-11-26 RX ADMIN — Medication 5 MILLIGRAM(S): at 21:55

## 2017-11-26 RX ADMIN — Medication 325 MILLIGRAM(S): at 11:59

## 2017-11-26 RX ADMIN — PANTOPRAZOLE SODIUM 40 MILLIGRAM(S): 20 TABLET, DELAYED RELEASE ORAL at 05:13

## 2017-11-26 RX ADMIN — Medication 50 MILLIGRAM(S): at 21:55

## 2017-11-26 RX ADMIN — Medication 10 MILLIGRAM(S): at 05:14

## 2017-11-26 RX ADMIN — ONDANSETRON 4 MILLIGRAM(S): 8 TABLET, FILM COATED ORAL at 18:04

## 2017-11-26 RX ADMIN — Medication 1 APPLICATION(S): at 17:00

## 2017-11-26 RX ADMIN — Medication 1 APPLICATION(S): at 05:15

## 2017-11-26 RX ADMIN — ENOXAPARIN SODIUM 30 MILLIGRAM(S): 100 INJECTION SUBCUTANEOUS at 05:13

## 2017-11-26 RX ADMIN — NYSTATIN CREAM 1 APPLICATION(S): 100000 CREAM TOPICAL at 05:15

## 2017-11-26 RX ADMIN — LOSARTAN POTASSIUM 50 MILLIGRAM(S): 100 TABLET, FILM COATED ORAL at 05:13

## 2017-11-26 RX ADMIN — Medication 100 UNIT(S): at 11:58

## 2017-11-26 RX ADMIN — NYSTATIN CREAM 1 APPLICATION(S): 100000 CREAM TOPICAL at 18:06

## 2017-11-26 RX ADMIN — SODIUM CHLORIDE 10 MILLILITER(S): 9 INJECTION INTRAMUSCULAR; INTRAVENOUS; SUBCUTANEOUS at 21:55

## 2017-11-26 RX ADMIN — Medication 10 MILLIGRAM(S): at 18:05

## 2017-11-26 RX ADMIN — DAPTOMYCIN 132 MILLIGRAM(S): 500 INJECTION, POWDER, LYOPHILIZED, FOR SOLUTION INTRAVENOUS at 18:06

## 2017-11-26 RX ADMIN — ONDANSETRON 4 MILLIGRAM(S): 8 TABLET, FILM COATED ORAL at 11:59

## 2017-11-26 NOTE — PROGRESS NOTE ADULT - SUBJECTIVE AND OBJECTIVE BOX
O/N: BRAYDEN   11/25: okay to have soup per Caitlyn     STATUS POST:   7/24: SBR resection, fistula resection, revision of esophagogegunostomy drain through esophageal hiatus in R mediastinum  7/29: Ex-lap, SB anastamosis in BP limb breakdown with succus throughout abdomen  8/1: abd washout, drainage of abscess, biologic mesh placement, closure of abdominal wall, vac and retention suture  8/7: abd washout, mesh removal, frozen abd noted, LLQ CHECO replaced with benson drain  8/10: leak noted from previous anastamosis site on L side, mid abdomen malecot drain placed in enterotomy, JPs x 2 placed, necrotic fascia debrided, vicryl mesh sutured to fascia circumferentially, xeroform over mesh then vac dressing placed	      Vital Signs Last 24 Hrs  T(C): 36.7 (25 Nov 2017 06:05), Max: 36.7 (25 Nov 2017 06:05)  T(F): 98.1 (25 Nov 2017 06:05), Max: 98.1 (25 Nov 2017 06:05)  HR: 70 (25 Nov 2017 06:05) (68 - 81)  BP: 171/111 (25 Nov 2017 06:05) (140/80 - 171/111)  BP(mean): --  RR: 16 (25 Nov 2017 06:05) (16 - 17)  SpO2: 94% (25 Nov 2017 06:05) (93% - 95%)      I&O's Summary    24 Nov 2017 07:01  -  25 Nov 2017 07:00  --------------------------------------------------------  IN: 1992 mL / OUT: 0 mL / NET: 1992 mL      Gen: NAD   Abd: soft, nt / nd   dressing in place O/N: BRAYDEN   11/25: okay to have soup per Roslin     STATUS POST:   7/24: SBR resection, fistula resection, revision of esophagogegunostomy drain through esophageal hiatus in R mediastinum  7/29: Ex-lap, SB anastamosis in BP limb breakdown with succus throughout abdomen  8/1: abd washout, drainage of abscess, biologic mesh placement, closure of abdominal wall, vac and retention suture  8/7: abd washout, mesh removal, frozen abd noted, LLQ CHECO replaced with benson drain  8/10: leak noted from previous anastamosis site on L side, mid abdomen malecot drain placed in enterotomy, JPs x 2 placed, necrotic fascia debrided, vicryl mesh sutured to fascia circumferentially, xeroform over mesh then vac dressing placed	        SUBJECTIVE:  Flatus: [x ] YES [ ] NO             Bowel Movement: [x ] YES [ ] NO  Pain (0-10):            Pain Control Adequate: [x ] YES [ ] NO  Nausea: [ ] YES [x ] NO            Vomiting: [ ] YES [x ] NO  Diarrhea: [ ] YES [x ] NO         Constipation: [ ] YES [x ] NO     Chest Pain: [ ] YES [x ] NO    SOB:  [ ] YES [x ] NO    MEDICATIONS  (STANDING):  bismuth subsalicylate Liquid 30 milliLiter(s) Oral daily  calcitriol Injectable 1 MICROGram(s) IV Push <User Schedule>  collagenase Ointment 1 Application(s) Topical daily  cyanocobalamin Injectable 1000 MICROGram(s) SubCutaneous every 7 days  Dakins Solution - 1/2 Strength 1 Application(s) Topical daily  DAPTOmycin IVPB 800 milliGRAM(s) IV Intermittent every 24 hours  dextrose 5%. 1000 milliLiter(s) (50 mL/Hr) IV Continuous <Continuous>  dextrose 50% Injectable 25 Gram(s) IV Push once  dextrose 50% Injectable 12.5 Gram(s) IV Push once  diazepam    Tablet 5 milliGRAM(s) Oral at bedtime  diphenhydrAMINE   Injectable 50 milliGRAM(s) IV Push at bedtime  enoxaparin Injectable 30 milliGRAM(s) SubCutaneous two times a day  escitalopram 20 milliGRAM(s) Oral daily  gabapentin 100 milliGRAM(s) Oral two times a day  heparin  flush 100 Units/mL Injectable 100 Unit(s) IV Push every other day  insulin lispro (HumaLOG) corrective regimen sliding scale   SubCutaneous every 6 hours  lidocaine   Patch 1 Patch Transdermal daily  losartan 50 milliGRAM(s) Oral daily  nystatin Powder 1 Application(s) Topical two times a day  octreotide  Injectable 450 MICROGram(s) IV Push daily  oxybutynin 10 milliGRAM(s) Oral two times a day  pantoprazole  Injectable 40 milliGRAM(s) IV Push daily  Parenteral Nutrition - Adult 1 Each (73 mL/Hr) TPN Continuous <Continuous>  sodium chloride 0.9% lock flush 20 milliLiter(s) IV Push once    MEDICATIONS  (PRN):  acetaminophen   Tablet. 325 milliGRAM(s) Oral every 4 hours PRN Moderate Pain (4 - 6)  ondansetron Injectable 4 milliGRAM(s) IV Push every 6 hours PRN Nausea and/or Vomiting  sodium chloride 0.9% lock flush 10 milliLiter(s) IV Push every 1 hour PRN After each medication administration  sodium chloride 0.9% lock flush 10 milliLiter(s) IV Push every 12 hours PRN Lumen of catheter NOT used      Vital Signs Last 24 Hrs  T(C): 37.1 (26 Nov 2017 05:34), Max: 37.2 (25 Nov 2017 20:25)  T(F): 98.8 (26 Nov 2017 05:34), Max: 98.9 (25 Nov 2017 20:25)  HR: 70 (26 Nov 2017 05:34) (66 - 77)  BP: 140/78 (26 Nov 2017 05:34) (136/85 - 148/78)  BP(mean): --  RR: 16 (26 Nov 2017 05:34) (16 - 17)  SpO2: 95% (26 Nov 2017 05:34) (94% - 95%)    PHYSICAL EXAM:      Constitutional: A&Ox3      Respiratory: non labored breathing, no respiratory distress    Cardiovascular: NSR, RRR    Gastrointestinal: Soft ND,NT                 Incision: dressings in place    Genitourinary: voiding    Extremities: (-) edema                  I&O's Detail    25 Nov 2017 07:01  -  26 Nov 2017 07:00  --------------------------------------------------------  IN:    Fat Emulsion 20%: 40 mL    Oral Fluid: 120 mL    TPN (Total Parenteral Nutrition): 1752 mL  Total IN: 1912 mL    OUT:  Total OUT: 0 mL    Total NET: 1912 mL          LABS:                        9.8    6.4   )-----------( 306      ( 25 Nov 2017 06:17 )             32.1     11-25    140  |  101  |  33<H>  ----------------------------<  126<H>  4.1   |  26  |  0.50    Ca    9.7      25 Nov 2017 06:16  Phos  3.4     11-25  Mg     2.0     11-25    TPro  7.0  /  Alb  3.1<L>  /  TBili  0.5  /  DBili  x   /  AST  36  /  ALT  62<H>  /  AlkPhos  299<H>  11-25          RADIOLOGY & ADDITIONAL STUDIES:

## 2017-11-27 LAB
ANION GAP SERPL CALC-SCNC: 10 MMOL/L — SIGNIFICANT CHANGE UP (ref 5–17)
BLD GP AB SCN SERPL QL: NEGATIVE — SIGNIFICANT CHANGE UP
BUN SERPL-MCNC: 39 MG/DL — HIGH (ref 7–23)
CALCIUM SERPL-MCNC: 9.7 MG/DL — SIGNIFICANT CHANGE UP (ref 8.4–10.5)
CHLORIDE SERPL-SCNC: 101 MMOL/L — SIGNIFICANT CHANGE UP (ref 96–108)
CO2 SERPL-SCNC: 28 MMOL/L — SIGNIFICANT CHANGE UP (ref 22–31)
CREAT SERPL-MCNC: 0.6 MG/DL — SIGNIFICANT CHANGE UP (ref 0.5–1.3)
GLUCOSE BLDC GLUCOMTR-MCNC: 113 MG/DL — HIGH (ref 70–99)
GLUCOSE BLDC GLUCOMTR-MCNC: 130 MG/DL — HIGH (ref 70–99)
GLUCOSE BLDC GLUCOMTR-MCNC: 99 MG/DL — SIGNIFICANT CHANGE UP (ref 70–99)
GLUCOSE SERPL-MCNC: 132 MG/DL — HIGH (ref 70–99)
HCT VFR BLD CALC: 34.1 % — LOW (ref 34.5–45)
HGB BLD-MCNC: 10.3 G/DL — LOW (ref 11.5–15.5)
MAGNESIUM SERPL-MCNC: 2.1 MG/DL — SIGNIFICANT CHANGE UP (ref 1.6–2.6)
MCHC RBC-ENTMCNC: 27.1 PG — SIGNIFICANT CHANGE UP (ref 27–34)
MCHC RBC-ENTMCNC: 30.2 G/DL — LOW (ref 32–36)
MCV RBC AUTO: 89.7 FL — SIGNIFICANT CHANGE UP (ref 80–100)
PHOSPHATE SERPL-MCNC: 3.5 MG/DL — SIGNIFICANT CHANGE UP (ref 2.5–4.5)
PLATELET # BLD AUTO: 294 K/UL — SIGNIFICANT CHANGE UP (ref 150–400)
POTASSIUM SERPL-MCNC: 4.4 MMOL/L — SIGNIFICANT CHANGE UP (ref 3.5–5.3)
POTASSIUM SERPL-SCNC: 4.4 MMOL/L — SIGNIFICANT CHANGE UP (ref 3.5–5.3)
RBC # BLD: 3.8 M/UL — SIGNIFICANT CHANGE UP (ref 3.8–5.2)
RBC # FLD: 16.3 % — SIGNIFICANT CHANGE UP (ref 10.3–16.9)
RH IG SCN BLD-IMP: POSITIVE — SIGNIFICANT CHANGE UP
SODIUM SERPL-SCNC: 139 MMOL/L — SIGNIFICANT CHANGE UP (ref 135–145)
TRIGL SERPL-MCNC: 187 MG/DL — HIGH (ref 10–149)
WBC # BLD: 6.4 K/UL — SIGNIFICANT CHANGE UP (ref 3.8–10.5)
WBC # FLD AUTO: 6.4 K/UL — SIGNIFICANT CHANGE UP (ref 3.8–10.5)

## 2017-11-27 RX ORDER — ACETAMINOPHEN 500 MG
1000 TABLET ORAL ONCE
Qty: 0 | Refills: 0 | Status: COMPLETED | OUTPATIENT
Start: 2017-11-27 | End: 2017-11-27

## 2017-11-27 RX ORDER — ELECTROLYTE SOLUTION,INJ
1 VIAL (ML) INTRAVENOUS
Qty: 0 | Refills: 0 | Status: DISCONTINUED | OUTPATIENT
Start: 2017-11-27 | End: 2017-11-27

## 2017-11-27 RX ADMIN — OCTREOTIDE ACETATE 450 MICROGRAM(S): 200 INJECTION, SOLUTION INTRAVENOUS; SUBCUTANEOUS at 13:40

## 2017-11-27 RX ADMIN — SODIUM CHLORIDE 10 MILLILITER(S): 9 INJECTION INTRAMUSCULAR; INTRAVENOUS; SUBCUTANEOUS at 03:42

## 2017-11-27 RX ADMIN — Medication 10 MILLIGRAM(S): at 07:05

## 2017-11-27 RX ADMIN — Medication 325 MILLIGRAM(S): at 03:42

## 2017-11-27 RX ADMIN — CALCITRIOL 1 MICROGRAM(S): 0.5 CAPSULE ORAL at 13:35

## 2017-11-27 RX ADMIN — ONDANSETRON 4 MILLIGRAM(S): 8 TABLET, FILM COATED ORAL at 03:42

## 2017-11-27 RX ADMIN — ONDANSETRON 4 MILLIGRAM(S): 8 TABLET, FILM COATED ORAL at 09:50

## 2017-11-27 RX ADMIN — NYSTATIN CREAM 1 APPLICATION(S): 100000 CREAM TOPICAL at 17:24

## 2017-11-27 RX ADMIN — DAPTOMYCIN 132 MILLIGRAM(S): 500 INJECTION, POWDER, LYOPHILIZED, FOR SOLUTION INTRAVENOUS at 17:20

## 2017-11-27 RX ADMIN — PANTOPRAZOLE SODIUM 40 MILLIGRAM(S): 20 TABLET, DELAYED RELEASE ORAL at 07:05

## 2017-11-27 RX ADMIN — LOSARTAN POTASSIUM 50 MILLIGRAM(S): 100 TABLET, FILM COATED ORAL at 07:05

## 2017-11-27 RX ADMIN — Medication 5 MILLIGRAM(S): at 21:25

## 2017-11-27 RX ADMIN — Medication 1000 MILLIGRAM(S): at 22:41

## 2017-11-27 RX ADMIN — Medication 10 MILLIGRAM(S): at 17:21

## 2017-11-27 RX ADMIN — ENOXAPARIN SODIUM 30 MILLIGRAM(S): 100 INJECTION SUBCUTANEOUS at 17:21

## 2017-11-27 RX ADMIN — ESCITALOPRAM OXALATE 20 MILLIGRAM(S): 10 TABLET, FILM COATED ORAL at 21:25

## 2017-11-27 RX ADMIN — Medication 50 MILLIGRAM(S): at 21:24

## 2017-11-27 RX ADMIN — Medication 325 MILLIGRAM(S): at 10:30

## 2017-11-27 RX ADMIN — Medication 325 MILLIGRAM(S): at 04:30

## 2017-11-27 RX ADMIN — SODIUM CHLORIDE 10 MILLILITER(S): 9 INJECTION INTRAMUSCULAR; INTRAVENOUS; SUBCUTANEOUS at 07:06

## 2017-11-27 RX ADMIN — Medication 325 MILLIGRAM(S): at 09:50

## 2017-11-27 RX ADMIN — GABAPENTIN 100 MILLIGRAM(S): 400 CAPSULE ORAL at 17:21

## 2017-11-27 RX ADMIN — Medication 325 MILLIGRAM(S): at 18:08

## 2017-11-27 RX ADMIN — LIDOCAINE 1 PATCH: 4 CREAM TOPICAL at 13:35

## 2017-11-27 RX ADMIN — ONDANSETRON 4 MILLIGRAM(S): 8 TABLET, FILM COATED ORAL at 17:21

## 2017-11-27 RX ADMIN — GABAPENTIN 100 MILLIGRAM(S): 400 CAPSULE ORAL at 07:07

## 2017-11-27 RX ADMIN — Medication 1 EACH: at 17:22

## 2017-11-27 RX ADMIN — Medication 325 MILLIGRAM(S): at 17:21

## 2017-11-27 RX ADMIN — ENOXAPARIN SODIUM 30 MILLIGRAM(S): 100 INJECTION SUBCUTANEOUS at 07:07

## 2017-11-27 RX ADMIN — Medication 400 MILLIGRAM(S): at 21:25

## 2017-11-27 NOTE — PROGRESS NOTE ADULT - SUBJECTIVE AND OBJECTIVE BOX
INTERVAL HPI/OVERNIGHT EVENTS:    ROS: No headache, dizziness, blurred vision, cough, chest pain, palpitations, SOB, abdominal pain, diarrhea, difficulty urinating.      ANTIBIOTICS/RELEVANT:    MEDICATIONS  (STANDING):  bismuth subsalicylate Liquid 30 milliLiter(s) Oral daily  calcitriol Injectable 1 MICROGram(s) IV Push <User Schedule>  collagenase Ointment 1 Application(s) Topical daily  cyanocobalamin Injectable 1000 MICROGram(s) SubCutaneous every 7 days  Dakins Solution - 1/2 Strength 1 Application(s) Topical daily  DAPTOmycin IVPB 800 milliGRAM(s) IV Intermittent every 24 hours  dextrose 5%. 1000 milliLiter(s) (50 mL/Hr) IV Continuous <Continuous>  dextrose 50% Injectable 25 Gram(s) IV Push once  dextrose 50% Injectable 12.5 Gram(s) IV Push once  diazepam    Tablet 5 milliGRAM(s) Oral at bedtime  diphenhydrAMINE   Injectable 50 milliGRAM(s) IV Push at bedtime  enoxaparin Injectable 30 milliGRAM(s) SubCutaneous two times a day  escitalopram 20 milliGRAM(s) Oral daily  gabapentin 100 milliGRAM(s) Oral two times a day  heparin  flush 100 Units/mL Injectable 100 Unit(s) IV Push every other day  insulin lispro (HumaLOG) corrective regimen sliding scale   SubCutaneous every 6 hours  lidocaine   Patch 1 Patch Transdermal daily  losartan 50 milliGRAM(s) Oral daily  nystatin Powder 1 Application(s) Topical two times a day  octreotide  Injectable 450 MICROGram(s) IV Push daily  oxybutynin 10 milliGRAM(s) Oral two times a day  pantoprazole  Injectable 40 milliGRAM(s) IV Push daily  Parenteral Nutrition - Adult 1 Each (73 mL/Hr) TPN Continuous <Continuous>  Parenteral Nutrition - Adult 1 Each (73 mL/Hr) TPN Continuous <Continuous>  sodium chloride 0.9% lock flush 20 milliLiter(s) IV Push once    MEDICATIONS  (PRN):  acetaminophen   Tablet. 325 milliGRAM(s) Oral every 4 hours PRN Moderate Pain (4 - 6)  ondansetron Injectable 4 milliGRAM(s) IV Push every 6 hours PRN Nausea and/or Vomiting  sodium chloride 0.9% lock flush 10 milliLiter(s) IV Push every 1 hour PRN After each medication administration  sodium chloride 0.9% lock flush 10 milliLiter(s) IV Push every 12 hours PRN Lumen of catheter NOT used        Vital Signs Last 24 Hrs  T(C): 36.8 (27 Nov 2017 09:05), Max: 37.3 (26 Nov 2017 14:49)  T(F): 98.2 (27 Nov 2017 09:05), Max: 99.1 (26 Nov 2017 14:49)  HR: 71 (27 Nov 2017 09:05) (61 - 71)  BP: 134/84 (27 Nov 2017 09:05) (120/71 - 163/87)  BP(mean): --  RR: 16 (27 Nov 2017 09:05) (14 - 17)  SpO2: 94% (27 Nov 2017 09:05) (93% - 95%)    11-26-17 @ 07:01  -  11-27-17 @ 07:00  --------------------------------------------------------  IN: 876 mL / OUT: 0 mL / NET: 876 mL      PHYSICAL EXAM:  Constitutional: Obese, NAD  Eyes: PERRL, EOMI  Ear/Nose/Throat:  no oral lesion, no sinus tenderness on percussion	  Neck: no JVD, no lymphadenopathy, supple  Respiratory: CTA b/l, no wheezes, no crackles, no accessory muscle use  Cardiovascular: S1S2 RRR, no murmurs/rubs/gallops  Gastrointestinal: soft, NTND, (+) BS, no HSM. Clean dressing over abdominal wound.  Extremities: WWP, no peripheral edema, no erythema, no cyanosis  Vascular: 2+ DP pulses b/l    LABS:                        10.3   6.4   )-----------( 294      ( 27 Nov 2017 07:39 )             34.1     11-27    139  |  101  |  39<H>  ----------------------------<  132<H>  4.4   |  28  |  0.60    Ca    9.7      27 Nov 2017 07:39  Phos  3.5     11-27  Mg     2.1     11-27            MICROBIOLOGY:    RADIOLOGY & ADDITIONAL STUDIES: INTERVAL HPI/OVERNIGHT EVENTS: Patient started on PO diet (soup); tolerating it well    ROS: No headache, dizziness, blurred vision, cough, chest pain, palpitations, SOB, abdominal pain, diarrhea, difficulty urinating.    ANTIBIOTICS/RELEVANT:    MEDICATIONS  (STANDING):  bismuth subsalicylate Liquid 30 milliLiter(s) Oral daily  calcitriol Injectable 1 MICROGram(s) IV Push <User Schedule>  collagenase Ointment 1 Application(s) Topical daily  cyanocobalamin Injectable 1000 MICROGram(s) SubCutaneous every 7 days  Dakins Solution - 1/2 Strength 1 Application(s) Topical daily  DAPTOmycin IVPB 800 milliGRAM(s) IV Intermittent every 24 hours  dextrose 5%. 1000 milliLiter(s) (50 mL/Hr) IV Continuous <Continuous>  dextrose 50% Injectable 25 Gram(s) IV Push once  dextrose 50% Injectable 12.5 Gram(s) IV Push once  diazepam    Tablet 5 milliGRAM(s) Oral at bedtime  diphenhydrAMINE   Injectable 50 milliGRAM(s) IV Push at bedtime  enoxaparin Injectable 30 milliGRAM(s) SubCutaneous two times a day  escitalopram 20 milliGRAM(s) Oral daily  gabapentin 100 milliGRAM(s) Oral two times a day  heparin  flush 100 Units/mL Injectable 100 Unit(s) IV Push every other day  insulin lispro (HumaLOG) corrective regimen sliding scale   SubCutaneous every 6 hours  lidocaine   Patch 1 Patch Transdermal daily  losartan 50 milliGRAM(s) Oral daily  nystatin Powder 1 Application(s) Topical two times a day  octreotide  Injectable 450 MICROGram(s) IV Push daily  oxybutynin 10 milliGRAM(s) Oral two times a day  pantoprazole  Injectable 40 milliGRAM(s) IV Push daily  Parenteral Nutrition - Adult 1 Each (73 mL/Hr) TPN Continuous <Continuous>  Parenteral Nutrition - Adult 1 Each (73 mL/Hr) TPN Continuous <Continuous>  sodium chloride 0.9% lock flush 20 milliLiter(s) IV Push once    MEDICATIONS  (PRN):  acetaminophen   Tablet. 325 milliGRAM(s) Oral every 4 hours PRN Moderate Pain (4 - 6)  ondansetron Injectable 4 milliGRAM(s) IV Push every 6 hours PRN Nausea and/or Vomiting  sodium chloride 0.9% lock flush 10 milliLiter(s) IV Push every 1 hour PRN After each medication administration  sodium chloride 0.9% lock flush 10 milliLiter(s) IV Push every 12 hours PRN Lumen of catheter NOT used        Vital Signs Last 24 Hrs  T(C): 36.8 (27 Nov 2017 09:05), Max: 37.3 (26 Nov 2017 14:49)  T(F): 98.2 (27 Nov 2017 09:05), Max: 99.1 (26 Nov 2017 14:49)  HR: 71 (27 Nov 2017 09:05) (61 - 71)  BP: 134/84 (27 Nov 2017 09:05) (120/71 - 163/87)  BP(mean): --  RR: 16 (27 Nov 2017 09:05) (14 - 17)  SpO2: 94% (27 Nov 2017 09:05) (93% - 95%)    11-26-17 @ 07:01  -  11-27-17 @ 07:00  --------------------------------------------------------  IN: 876 mL / OUT: 0 mL / NET: 876 mL      PHYSICAL EXAM:  Constitutional: Obese, NAD  Eyes: PERRL, EOMI  Ear/Nose/Throat:  no oral lesion, no sinus tenderness on percussion	  Neck: no JVD, no lymphadenopathy, supple  Respiratory: CTA b/l, no wheezes, no crackles, no accessory muscle use  Cardiovascular: S1S2 RRR, no murmurs/rubs/gallops  Gastrointestinal: soft, NTND, (+) BS, no HSM. Clean dressing over abdominal wound.  Extremities: WWP, no peripheral edema, no erythema, no cyanosis  Vascular: 2+ DP pulses b/l    LABS:                        10.3   6.4   )-----------( 294      ( 27 Nov 2017 07:39 )             34.1     11-27    139  |  101  |  39<H>  ----------------------------<  132<H>  4.4   |  28  |  0.60    Ca    9.7      27 Nov 2017 07:39  Phos  3.5     11-27  Mg     2.1     11-27            MICROBIOLOGY:    RADIOLOGY & ADDITIONAL STUDIES:

## 2017-11-27 NOTE — PROGRESS NOTE ADULT - SUBJECTIVE AND OBJECTIVE BOX
O/N: VSS. BRAYDEN  11/26: vac changed, roslin allowed soup OVERNIGHT EVENTS:  VSS. BRAYDEN  11/26: vac changed, roslin allowed soup    STATUS POST:    7/24: SBR resection, fistula resection, revision of esophagogegunostomy drain through esophageal hiatus in R mediastinum  7/29: Ex-lap, SB anastamosis in BP limb breakdown with succus throughout abdomen  8/1: abd washout, drainage of abscess, biologic mesh placement, closure of abdominal wall, vac and retention suture  8/7: abd washout, mesh removal, frozen abd noted, LLQ CHECO replaced with benson drain  8/10: leak noted from previous anastamosis site on L side, mid abdomen malecot drain placed in enterotomy, JPs x 2 placed, necrotic fascia debrided, vicryl mesh sutured to fascia circumferentially, xeroform over mesh then vac dressing placed	      SUBJECTIVE: Patient denies any complaints.  Flatus: [X] YES [] NO             Bowel Movement: [ X] YES [ ] NO  Pain (0-10):            Pain Control Adequate: [X ] YES [ ] NO  Nausea: [ ] YES [ X] NO            Vomiting: [ ] YES [X ] NO  Diarrhea: [ ] YES [X ] NO         Constipation: [ ] YES [ X] NO     Chest Pain: [ ] YES [X ] NO    SOB:  [ ] YES [ X] NO    DAPTOmycin IVPB 800 milliGRAM(s) IV Intermittent every 24 hours  enoxaparin Injectable 30 milliGRAM(s) SubCutaneous two times a day  heparin  flush 100 Units/mL Injectable 100 Unit(s) IV Push every other day  losartan 50 milliGRAM(s) Oral daily      Vital Signs Last 24 Hrs  T(C): 36.4 (27 Nov 2017 05:23), Max: 37.3 (26 Nov 2017 14:49)  T(F): 97.6 (27 Nov 2017 05:23), Max: 99.1 (26 Nov 2017 14:49)  HR: 61 (27 Nov 2017 05:23) (61 - 74)  BP: 120/71 (27 Nov 2017 05:23) (120/71 - 163/87)  BP(mean): --  RR: 17 (27 Nov 2017 05:23) (14 - 17)  SpO2: 93% (27 Nov 2017 05:23) (93% - 95%)  I&O's Detail    25 Nov 2017 07:01  -  26 Nov 2017 07:00  --------------------------------------------------------  IN:    Fat Emulsion 20%: 40 mL    Oral Fluid: 120 mL    TPN (Total Parenteral Nutrition): 1752 mL  Total IN: 1912 mL    OUT:  Total OUT: 0 mL    Total NET: 1912 mL      26 Nov 2017 07:01  -  27 Nov 2017 06:57  --------------------------------------------------------  IN:    TPN (Total Parenteral Nutrition): 876 mL  Total IN: 876 mL    OUT:  Total OUT: 0 mL    Total NET: 876 mL          General: NAD, resting comfortably in bed  C/V: NSR  Pulm: Nonlabored breathing, no respiratory distress  Abd: soft, NT/ND. dressing clean and dry. wound has healthy granulation tissue  Extrem: WWP, no edema, SCDs in place        LABS:                        10.1   5.6   )-----------( 288      ( 26 Nov 2017 15:45 )             33.5     11-26    140  |  103  |  38<H>  ----------------------------<  126<H>  4.4   |  25  |  0.57    Ca    9.6      26 Nov 2017 15:45  Phos  3.4     11-26  Mg     2.1     11-26

## 2017-11-27 NOTE — PROGRESS NOTE ADULT - ASSESSMENT
#MRSA Bacteremia with Coag negative staphylococcus from central line  - c/w IV Daptomycin 8mg/kg q24h.   - will f/u results of [facility]  - patient needs repeat surveillance blood culture from peripheral site to ensure patient is still not bacteremic with MRSA  - ID to follow    Plan discussed with ID attending and primary team

## 2017-11-27 NOTE — ADVANCED PRACTICE NURSE CONSULT - ASSESSMENT
Wound bed with pale red, nongranular tissue, multiple fistulae in wound bed draining light green and dark brown effluent. Deborah wound skin intact. Applied stoma powder to periwound, sealed with liquid skin barrier. Applied Adaptic Touch, then wet to damp Kerlix  to wound bed, Capo ring to skin fold at 4 o'clock, skin barriers to wound edges, Capo wound manager, then Stoma paste to edges of wound manager and covered with transparent dressing.  Maintained NG tube in fistula on left lateral side to low wall suction as ordered by Dr Brady.   Toya RAZO, present during application of pouch.

## 2017-11-27 NOTE — CHART NOTE - NSCHARTNOTEFT_GEN_A_CORE
Admitting Diagnosis:   Patient is a 57y old  Female who presents with a chief complaint of enterocutaneous fistula (24 Jul 2017 14:15)      PAST MEDICAL & SURGICAL HISTORY:  Reflux  Obesity  DVT (deep venous thrombosis): 2013 neck  Diverticulitis  Hypertension  Elective surgery: abodominal wall surgery  dec 2016  Elective surgery: NOV 2016 gastric bypass revision  Gastric bypass status for obesity: gastric sleeve, 6/2012  S/P breast biopsy: 2011, left  S/P colon resection: 2011  S/P knee surgery: repair 2009, 2011  right; left  Umbilical hernia: 2000  S/P appendectomy: 1975  S/P tonsillectomy: 1967      Current Nutrition Order:  NPO with Ice Chips/Sips of Water   >> TPN via central venous line:  1752 TV (73ml/hr); 355g D, 140g AA (1767kcal non lipid calories) and  50g 20% lipids; total kcal w/ lipids: (2267 kcal, 2.7g/kg IBW pro, GIR 3.30)  >> Per MD note, pt is allowed to have soup x1/day.    PO Intake: Good (%) [   ]  Fair (50-75%) [   ] Poor (<25%) [ x  ]  Pt reported having a pureed soup and tolerating well.     GI Issues: No reported GI distress at this time.    Pain: No C/o pain.    Skin Integrity: sacrum un-stageable PU; sacral abrasion; abd wound--> NGtube placed w/ sponge through pouch into fistula for suction      Labs:   11-27    139  |  101  |  39<H>  ----------------------------<  132<H>  4.4   |  28  |  0.60    Ca    9.7      27 Nov 2017 07:39  Phos  3.5     11-27  Mg     2.1     11-27      CAPILLARY BLOOD GLUCOSE      POCT Blood Glucose.: 113 mg/dL (27 Nov 2017 12:59)  POCT Blood Glucose.: 130 mg/dL (27 Nov 2017 06:15)  POCT Blood Glucose.: 131 mg/dL (26 Nov 2017 23:45)  POCT Blood Glucose.: 80 mg/dL (26 Nov 2017 17:29)      Medications:  MEDICATIONS  (STANDING):  bismuth subsalicylate Liquid 30 milliLiter(s) Oral daily  calcitriol Injectable 1 MICROGram(s) IV Push <User Schedule>  collagenase Ointment 1 Application(s) Topical daily  cyanocobalamin Injectable 1000 MICROGram(s) SubCutaneous every 7 days  Dakins Solution - 1/2 Strength 1 Application(s) Topical daily  DAPTOmycin IVPB 800 milliGRAM(s) IV Intermittent every 24 hours  dextrose 5%. 1000 milliLiter(s) (50 mL/Hr) IV Continuous <Continuous>  dextrose 50% Injectable 25 Gram(s) IV Push once  dextrose 50% Injectable 12.5 Gram(s) IV Push once  diazepam    Tablet 5 milliGRAM(s) Oral at bedtime  diphenhydrAMINE   Injectable 50 milliGRAM(s) IV Push at bedtime  enoxaparin Injectable 30 milliGRAM(s) SubCutaneous two times a day  escitalopram 20 milliGRAM(s) Oral daily  gabapentin 100 milliGRAM(s) Oral two times a day  heparin  flush 100 Units/mL Injectable 100 Unit(s) IV Push every other day  insulin lispro (HumaLOG) corrective regimen sliding scale   SubCutaneous every 6 hours  lidocaine   Patch 1 Patch Transdermal daily  losartan 50 milliGRAM(s) Oral daily  nystatin Powder 1 Application(s) Topical two times a day  octreotide  Injectable 450 MICROGram(s) IV Push daily  oxybutynin 10 milliGRAM(s) Oral two times a day  pantoprazole  Injectable 40 milliGRAM(s) IV Push daily  Parenteral Nutrition - Adult 1 Each (73 mL/Hr) TPN Continuous <Continuous>  Parenteral Nutrition - Adult 1 Each (73 mL/Hr) TPN Continuous <Continuous>  sodium chloride 0.9% lock flush 20 milliLiter(s) IV Push once    MEDICATIONS  (PRN):  acetaminophen   Tablet. 325 milliGRAM(s) Oral every 4 hours PRN Moderate Pain (4 - 6)  ondansetron Injectable 4 milliGRAM(s) IV Push every 6 hours PRN Nausea and/or Vomiting  sodium chloride 0.9% lock flush 10 milliLiter(s) IV Push every 1 hour PRN After each medication administration  sodium chloride 0.9% lock flush 10 milliLiter(s) IV Push every 12 hours PRN Lumen of catheter NOT used      Weight:  Bed Scale:  161.5lb (11/13)  160.5lb (11/8)   Standing Scale:  167lb (11/13)  164lb (10/17)  219lb (8/1)    Weight Change:   03/2016: 89kg  02/2017: 74kg   07/2017: 76kg  11/2017: 76kg   Wt appears to be stable over last 4 months. Please continue to take wts for trending.      Estimated energy needs:   Estimated energy needs using 52 kg IBW:  increased needs per wound and pressure ulcer healing. needs calculated based on TPN as primary route of nutrition.  30-35 kcal/kg (3648-9843 kcal).   1.8-2 g/kg ( g protein).  30-35 mL/kg (9561-3919 mL fluid).       Subjective:   57 F s/p  SBR, fistula resection, esophagojejunostomy revision w/ post-op course complicated by RTOR for distal anastomosis breakdown, ATN, acute respiratory failure, & septic shock, MRSA bacteremia which resolved. Currently for wound care management; dressing clean and dry, wound has healthy granulation tissue per notes.   Pt remains NPO except ice chips and sips of water. TPN bag infusing at 73ml/hr providing 355g Dex, 140g AA, 50g 20% lipids (2267 kcal, 2.7g/kg IBW pro, GIR 3.30).   Pt reports she is allowed to have soup BID. per RN pt is receiving soup x1/day. Pt states she is tolerating soup well -no GI distress.  Will follow on po intake and amount of calories received from po when feasible. Wt has been stable; please update wt weekly.     Previous Nutrition Diagnosis: Increased nutrient needs RT increased demand for nutrients AEB wound and pressure ulcer healing   Active [  X]  Resolved [   ]    If resolved, new PES:     Goal: Pt to meet >75% EER via tolerated route    Recommendations:  1) Continue w/ current TPN order as tolerated and assess feasibility of other routes w/wound healing  2) Monitor triglycerides  adjust lipids per MD discretion  3) Please continue to take standing weight for trending purposes      Education: Understands meeting nutrient needs via TPN.    Risk Level: High [  X ] Moderate [   ] Low [   ].

## 2017-11-28 ENCOUNTER — OTHER (OUTPATIENT)
Age: 57
End: 2017-11-28

## 2017-11-28 LAB
ANION GAP SERPL CALC-SCNC: 11 MMOL/L — SIGNIFICANT CHANGE UP (ref 5–17)
APTT BLD: 39.4 SEC — HIGH (ref 27.5–37.4)
BUN SERPL-MCNC: 38 MG/DL — HIGH (ref 7–23)
CALCIUM SERPL-MCNC: 10 MG/DL — SIGNIFICANT CHANGE UP (ref 8.4–10.5)
CHLORIDE SERPL-SCNC: 105 MMOL/L — SIGNIFICANT CHANGE UP (ref 96–108)
CO2 SERPL-SCNC: 27 MMOL/L — SIGNIFICANT CHANGE UP (ref 22–31)
CREAT SERPL-MCNC: 0.64 MG/DL — SIGNIFICANT CHANGE UP (ref 0.5–1.3)
GLUCOSE BLDC GLUCOMTR-MCNC: 108 MG/DL — HIGH (ref 70–99)
GLUCOSE BLDC GLUCOMTR-MCNC: 112 MG/DL — HIGH (ref 70–99)
GLUCOSE BLDC GLUCOMTR-MCNC: 118 MG/DL — HIGH (ref 70–99)
GLUCOSE BLDC GLUCOMTR-MCNC: 124 MG/DL — HIGH (ref 70–99)
GLUCOSE SERPL-MCNC: 125 MG/DL — HIGH (ref 70–99)
HCT VFR BLD CALC: 33.5 % — LOW (ref 34.5–45)
HGB BLD-MCNC: 10 G/DL — LOW (ref 11.5–15.5)
INR BLD: 1.22 — HIGH (ref 0.88–1.16)
MAGNESIUM SERPL-MCNC: 2.1 MG/DL — SIGNIFICANT CHANGE UP (ref 1.6–2.6)
MCHC RBC-ENTMCNC: 27.2 PG — SIGNIFICANT CHANGE UP (ref 27–34)
MCHC RBC-ENTMCNC: 29.9 G/DL — LOW (ref 32–36)
MCV RBC AUTO: 91 FL — SIGNIFICANT CHANGE UP (ref 80–100)
PHOSPHATE SERPL-MCNC: 3.8 MG/DL — SIGNIFICANT CHANGE UP (ref 2.5–4.5)
PLATELET # BLD AUTO: 294 K/UL — SIGNIFICANT CHANGE UP (ref 150–400)
POTASSIUM SERPL-MCNC: 4.4 MMOL/L — SIGNIFICANT CHANGE UP (ref 3.5–5.3)
POTASSIUM SERPL-SCNC: 4.4 MMOL/L — SIGNIFICANT CHANGE UP (ref 3.5–5.3)
PROTHROM AB SERPL-ACNC: 13.6 SEC — HIGH (ref 9.8–12.7)
RBC # BLD: 3.68 M/UL — LOW (ref 3.8–5.2)
RBC # FLD: 16.2 % — SIGNIFICANT CHANGE UP (ref 10.3–16.9)
SODIUM SERPL-SCNC: 143 MMOL/L — SIGNIFICANT CHANGE UP (ref 135–145)
WBC # BLD: 5.9 K/UL — SIGNIFICANT CHANGE UP (ref 3.8–10.5)
WBC # FLD AUTO: 5.9 K/UL — SIGNIFICANT CHANGE UP (ref 3.8–10.5)

## 2017-11-28 PROCEDURE — 93306 TTE W/DOPPLER COMPLETE: CPT | Mod: 26

## 2017-11-28 RX ORDER — HYDROMORPHONE HYDROCHLORIDE 2 MG/ML
0.25 INJECTION INTRAMUSCULAR; INTRAVENOUS; SUBCUTANEOUS ONCE
Qty: 0 | Refills: 0 | Status: DISCONTINUED | OUTPATIENT
Start: 2017-11-28 | End: 2017-11-28

## 2017-11-28 RX ORDER — ELECTROLYTE SOLUTION,INJ
1 VIAL (ML) INTRAVENOUS
Qty: 0 | Refills: 0 | Status: DISCONTINUED | OUTPATIENT
Start: 2017-11-28 | End: 2017-11-28

## 2017-11-28 RX ORDER — ACETAMINOPHEN 500 MG
1000 TABLET ORAL ONCE
Qty: 0 | Refills: 0 | Status: COMPLETED | OUTPATIENT
Start: 2017-11-28 | End: 2017-11-28

## 2017-11-28 RX ADMIN — DAPTOMYCIN 132 MILLIGRAM(S): 500 INJECTION, POWDER, LYOPHILIZED, FOR SOLUTION INTRAVENOUS at 18:10

## 2017-11-28 RX ADMIN — SODIUM CHLORIDE 10 MILLILITER(S): 9 INJECTION INTRAMUSCULAR; INTRAVENOUS; SUBCUTANEOUS at 00:59

## 2017-11-28 RX ADMIN — Medication 325 MILLIGRAM(S): at 23:08

## 2017-11-28 RX ADMIN — OCTREOTIDE ACETATE 450 MICROGRAM(S): 200 INJECTION, SOLUTION INTRAVENOUS; SUBCUTANEOUS at 11:29

## 2017-11-28 RX ADMIN — LIDOCAINE 1 PATCH: 4 CREAM TOPICAL at 11:29

## 2017-11-28 RX ADMIN — ENOXAPARIN SODIUM 30 MILLIGRAM(S): 100 INJECTION SUBCUTANEOUS at 05:16

## 2017-11-28 RX ADMIN — Medication 325 MILLIGRAM(S): at 18:08

## 2017-11-28 RX ADMIN — Medication 325 MILLIGRAM(S): at 18:43

## 2017-11-28 RX ADMIN — NYSTATIN CREAM 1 APPLICATION(S): 100000 CREAM TOPICAL at 18:22

## 2017-11-28 RX ADMIN — Medication 325 MILLIGRAM(S): at 03:45

## 2017-11-28 RX ADMIN — HYDROMORPHONE HYDROCHLORIDE 0.25 MILLIGRAM(S): 2 INJECTION INTRAMUSCULAR; INTRAVENOUS; SUBCUTANEOUS at 21:04

## 2017-11-28 RX ADMIN — Medication 1000 MILLIGRAM(S): at 06:20

## 2017-11-28 RX ADMIN — ENOXAPARIN SODIUM 30 MILLIGRAM(S): 100 INJECTION SUBCUTANEOUS at 18:09

## 2017-11-28 RX ADMIN — Medication 50 MILLIGRAM(S): at 21:57

## 2017-11-28 RX ADMIN — LIDOCAINE 1 PATCH: 4 CREAM TOPICAL at 23:49

## 2017-11-28 RX ADMIN — Medication 100 UNIT(S): at 11:29

## 2017-11-28 RX ADMIN — ESCITALOPRAM OXALATE 20 MILLIGRAM(S): 10 TABLET, FILM COATED ORAL at 21:57

## 2017-11-28 RX ADMIN — ONDANSETRON 4 MILLIGRAM(S): 8 TABLET, FILM COATED ORAL at 18:08

## 2017-11-28 RX ADMIN — GABAPENTIN 100 MILLIGRAM(S): 400 CAPSULE ORAL at 18:09

## 2017-11-28 RX ADMIN — NYSTATIN CREAM 1 APPLICATION(S): 100000 CREAM TOPICAL at 07:04

## 2017-11-28 RX ADMIN — Medication 1 EACH: at 18:10

## 2017-11-28 RX ADMIN — ONDANSETRON 4 MILLIGRAM(S): 8 TABLET, FILM COATED ORAL at 00:56

## 2017-11-28 RX ADMIN — HYDROMORPHONE HYDROCHLORIDE 0.25 MILLIGRAM(S): 2 INJECTION INTRAMUSCULAR; INTRAVENOUS; SUBCUTANEOUS at 20:49

## 2017-11-28 RX ADMIN — Medication 5 MILLIGRAM(S): at 21:57

## 2017-11-28 RX ADMIN — PANTOPRAZOLE SODIUM 40 MILLIGRAM(S): 20 TABLET, DELAYED RELEASE ORAL at 05:16

## 2017-11-28 RX ADMIN — Medication 1 APPLICATION(S): at 07:04

## 2017-11-28 RX ADMIN — Medication 325 MILLIGRAM(S): at 22:08

## 2017-11-28 RX ADMIN — Medication 1 APPLICATION(S): at 11:36

## 2017-11-28 RX ADMIN — Medication 325 MILLIGRAM(S): at 02:16

## 2017-11-28 RX ADMIN — Medication 400 MILLIGRAM(S): at 05:52

## 2017-11-28 RX ADMIN — LIDOCAINE 1 PATCH: 4 CREAM TOPICAL at 00:51

## 2017-11-28 RX ADMIN — Medication 10 MILLIGRAM(S): at 18:08

## 2017-11-28 NOTE — PROGRESS NOTE ADULT - ASSESSMENT
Patient being transported to [facility] at time of examination.  Discussed w/ primary surgery team, surv. cultures drawn this AM, results pending.  Plan to wait for central line replacement pending negative blood cultures.    - F/u surv. cultures  - Recommend c/w daptomycin  - F/u [facility] results

## 2017-11-28 NOTE — PROGRESS NOTE ADULT - SUBJECTIVE AND OBJECTIVE BOX
O/N: IV tylenol, VSS. BRAYDEN.  11/27: Patient will have echo performed in the AM patient had soup , dressing changed, STRICT NPO , OVERNIGHT EVENTS:  IV tylenol, VSS. BRAYDEN.  11/27: Patient will have echo performed in the AM patient had soup , dressing changed, STRICT NPO .    STATUS POST:    7/24: SBR resection, fistula resection, revision of esophagogegunostomy drain through esophageal hiatus in R mediastinum  7/29: Ex-lap, SB anastamosis in BP limb breakdown with succus throughout abdomen  8/1: abd washout, drainage of abscess, biologic mesh placement, closure of abdominal wall, vac and retention suture  8/7: abd washout, mesh removal, frozen abd noted, LLQ CHECO replaced with benson drain  8/10: leak noted from previous anastamosis site on L side, mid abdomen malecot drain placed in enterotomy, JPs x 2 placed, necrotic fascia debrided, vicryl mesh sutured to fascia circumferentially, xeroform over mesh then vac dressing placed	          SUBJECTIVE:  Flatus: [X] YES [] NO             Bowel Movement: [ X] YES [ ] NO  Pain (0-10):            Pain Control Adequate: [ X] YES [ ] NO  Nausea: [ ] YES [X ] NO            Vomiting: [ ] YES [X ] NO  Diarrhea: [ ] YES [X ] NO         Constipation: [ ] YES [X ] NO     Chest Pain: [ ] YES [X ] NO    SOB:  [ ] YES [X ] NO    DAPTOmycin IVPB 800 milliGRAM(s) IV Intermittent every 24 hours  enoxaparin Injectable 30 milliGRAM(s) SubCutaneous two times a day  heparin  flush 100 Units/mL Injectable 100 Unit(s) IV Push every other day  losartan 50 milliGRAM(s) Oral daily      Vital Signs Last 24 Hrs  T(C): 36.1 (28 Nov 2017 04:53), Max: 37 (27 Nov 2017 17:04)  T(F): 97 (28 Nov 2017 04:53), Max: 98.6 (27 Nov 2017 17:04)  HR: 61 (28 Nov 2017 04:53) (61 - 75)  BP: 150/83 (28 Nov 2017 04:53) (133/81 - 150/83)  BP(mean): --  RR: 16 (28 Nov 2017 04:53) (16 - 17)  SpO2: 94% (28 Nov 2017 04:53) (93% - 96%)  I&O's Detail    27 Nov 2017 07:01  -  28 Nov 2017 07:00  --------------------------------------------------------  IN:    TPN (Total Parenteral Nutrition): 876 mL    TPN (Total Parenteral Nutrition): 876 mL  Total IN: 1752 mL    OUT:    Drain: 425 mL  Total OUT: 425 mL    Total NET: 1327 mL          General: NAD, resting comfortably in bed  C/V: NSR  Pulm: Nonlabored breathing, no respiratory distress  Abd: soft, NT/ND. dressing clean and dry, wound has healthy granulation tissue  Extrem: WWP, no edema, SCDs in place        LABS:                        10.0   5.9   )-----------( 294      ( 28 Nov 2017 07:08 )             33.5     11-28    143  |  105  |  38<H>  ----------------------------<  125<H>  4.4   |  27  |  0.64    Ca    10.0      28 Nov 2017 07:08  Phos  3.8     11-28  Mg     2.1     11-28      PT/INR - ( 28 Nov 2017 07:08 )   PT: 13.6 sec;   INR: 1.22          PTT - ( 28 Nov 2017 07:08 )  PTT:39.4 sec      RADIOLOGY & ADDITIONAL STUDIES:

## 2017-11-28 NOTE — PROGRESS NOTE ADULT - ATTENDING COMMENTS
ID Attending    Pt to have [facility] today    We would like today's blood cultures to be documented negative before a new PICL is placed.    Will follow

## 2017-11-28 NOTE — PROGRESS NOTE ADULT - SUBJECTIVE AND OBJECTIVE BOX
very stable  albumin total protein are fine  continue placing sponge around ngt as that is best wound has been and is dian

## 2017-11-28 NOTE — PROGRESS NOTE ADULT - SUBJECTIVE AND OBJECTIVE BOX
On interval followup, the left subclavian venous catheter site is clean, dry and non-inflamed/non-tender. Afebrile. Notes and recent blood cultures are followed. The consideration and timing of a new central  venous catheter replacement was discussed with surgery, Dr Lux and the pt..

## 2017-11-28 NOTE — PROGRESS NOTE ADULT - SUBJECTIVE AND OBJECTIVE BOX
INTERVAL HPI/OVERNIGHT EVENTS:    CONSTITUTIONAL:  Negative fever or chills, feels well, good appetite  EYES:  Negative  blurry vision or double vision  CARDIOVASCULAR:  Negative for chest pain or palpitations  RESPIRATORY:  Negative for cough, wheezing, or SOB   GASTROINTESTINAL:  Negative for nausea, vomiting, diarrhea, constipation, or abdominal pain  GENITOURINARY:  Negative frequency, urgency or dysuria  NEUROLOGIC:  No headache, confusion, dizziness, lightheadedness      ANTIBIOTICS/RELEVANT:    MEDICATIONS  (STANDING):  bismuth subsalicylate Liquid 30 milliLiter(s) Oral daily  calcitriol Injectable 1 MICROGram(s) IV Push <User Schedule>  collagenase Ointment 1 Application(s) Topical daily  cyanocobalamin Injectable 1000 MICROGram(s) SubCutaneous every 7 days  Dakins Solution - 1/2 Strength 1 Application(s) Topical daily  DAPTOmycin IVPB 800 milliGRAM(s) IV Intermittent every 24 hours  dextrose 5%. 1000 milliLiter(s) (50 mL/Hr) IV Continuous <Continuous>  dextrose 50% Injectable 25 Gram(s) IV Push once  dextrose 50% Injectable 12.5 Gram(s) IV Push once  diazepam    Tablet 5 milliGRAM(s) Oral at bedtime  diphenhydrAMINE   Injectable 50 milliGRAM(s) IV Push at bedtime  enoxaparin Injectable 30 milliGRAM(s) SubCutaneous two times a day  escitalopram 20 milliGRAM(s) Oral daily  gabapentin 100 milliGRAM(s) Oral two times a day  heparin  flush 100 Units/mL Injectable 100 Unit(s) IV Push every other day  insulin lispro (HumaLOG) corrective regimen sliding scale   SubCutaneous every 6 hours  lidocaine   Patch 1 Patch Transdermal daily  losartan 50 milliGRAM(s) Oral daily  nystatin Powder 1 Application(s) Topical two times a day  octreotide  Injectable 450 MICROGram(s) IV Push daily  oxybutynin 10 milliGRAM(s) Oral two times a day  pantoprazole  Injectable 40 milliGRAM(s) IV Push daily  Parenteral Nutrition - Adult 1 Each (73 mL/Hr) TPN Continuous <Continuous>  Parenteral Nutrition - Adult 1 Each (73 mL/Hr) TPN Continuous <Continuous>  sodium chloride 0.9% lock flush 20 milliLiter(s) IV Push once    MEDICATIONS  (PRN):  acetaminophen   Tablet. 325 milliGRAM(s) Oral every 4 hours PRN Moderate Pain (4 - 6)  ondansetron Injectable 4 milliGRAM(s) IV Push every 6 hours PRN Nausea and/or Vomiting  sodium chloride 0.9% lock flush 10 milliLiter(s) IV Push every 1 hour PRN After each medication administration  sodium chloride 0.9% lock flush 10 milliLiter(s) IV Push every 12 hours PRN Lumen of catheter NOT used        Vital Signs Last 24 Hrs  T(C): 35.8 (28 Nov 2017 08:45), Max: 37 (27 Nov 2017 17:04)  T(F): 96.4 (28 Nov 2017 08:45), Max: 98.6 (27 Nov 2017 17:04)  HR: 68 (28 Nov 2017 08:45) (61 - 75)  BP: 147/88 (28 Nov 2017 08:45) (133/81 - 150/83)  BP(mean): --  RR: 16 (28 Nov 2017 08:45) (16 - 17)  SpO2: 93% (28 Nov 2017 08:45) (93% - 96%)    PHYSICAL EXAM:  Constitutional:Well-developed, well nourished  Eyes:CL, EOMI  Ear/Nose/Throat: no oral lesion, no sinus tenderness on percussion	  Neck:no JVD, no lymphadenopathy, supple  Respiratory: CTA eileen  Cardiovascular: S1S2 RRR, no murmurs  Gastrointestinal:soft, (+) BS, no HSM  Extremities:no e/e/c  Vascular: DP Pulse:	right normal; left normal      LABS:                        10.0   5.9   )-----------( 294      ( 28 Nov 2017 07:08 )             33.5     11-28    143  |  105  |  38<H>  ----------------------------<  125<H>  4.4   |  27  |  0.64    Ca    10.0      28 Nov 2017 07:08  Phos  3.8     11-28  Mg     2.1     11-28      PT/INR - ( 28 Nov 2017 07:08 )   PT: 13.6 sec;   INR: 1.22          PTT - ( 28 Nov 2017 07:08 )  PTT:39.4 sec      MICROBIOLOGY:    RADIOLOGY & ADDITIONAL STUDIES: INTERVAL HPI/OVERNIGHT EVENTS: BCx draw this AM.  Patient comfortable upon exam.    CONSTITUTIONAL:  Negative fever or chills, feels well, good appetite  EYES:  Negative  blurry vision or double vision  CARDIOVASCULAR:  Negative for chest pain or palpitations  RESPIRATORY:  Negative for cough, wheezing, or SOB   GASTROINTESTINAL:  Negative for nausea, vomiting, diarrhea, constipation, or abdominal pain  GENITOURINARY:  Negative frequency, urgency or dysuria  NEUROLOGIC:  No headache, confusion, dizziness, lightheadedness    ANTIBIOTICS/RELEVANT:    MEDICATIONS  (STANDING):  bismuth subsalicylate Liquid 30 milliLiter(s) Oral daily  calcitriol Injectable 1 MICROGram(s) IV Push <User Schedule>  collagenase Ointment 1 Application(s) Topical daily  cyanocobalamin Injectable 1000 MICROGram(s) SubCutaneous every 7 days  Dakins Solution - 1/2 Strength 1 Application(s) Topical daily  DAPTOmycin IVPB 800 milliGRAM(s) IV Intermittent every 24 hours  dextrose 5%. 1000 milliLiter(s) (50 mL/Hr) IV Continuous <Continuous>  dextrose 50% Injectable 25 Gram(s) IV Push once  dextrose 50% Injectable 12.5 Gram(s) IV Push once  diazepam    Tablet 5 milliGRAM(s) Oral at bedtime  diphenhydrAMINE   Injectable 50 milliGRAM(s) IV Push at bedtime  enoxaparin Injectable 30 milliGRAM(s) SubCutaneous two times a day  escitalopram 20 milliGRAM(s) Oral daily  gabapentin 100 milliGRAM(s) Oral two times a day  heparin  flush 100 Units/mL Injectable 100 Unit(s) IV Push every other day  insulin lispro (HumaLOG) corrective regimen sliding scale   SubCutaneous every 6 hours  lidocaine   Patch 1 Patch Transdermal daily  losartan 50 milliGRAM(s) Oral daily  nystatin Powder 1 Application(s) Topical two times a day  octreotide  Injectable 450 MICROGram(s) IV Push daily  oxybutynin 10 milliGRAM(s) Oral two times a day  pantoprazole  Injectable 40 milliGRAM(s) IV Push daily  Parenteral Nutrition - Adult 1 Each (73 mL/Hr) TPN Continuous <Continuous>  Parenteral Nutrition - Adult 1 Each (73 mL/Hr) TPN Continuous <Continuous>  sodium chloride 0.9% lock flush 20 milliLiter(s) IV Push once    MEDICATIONS  (PRN):  acetaminophen   Tablet. 325 milliGRAM(s) Oral every 4 hours PRN Moderate Pain (4 - 6)  ondansetron Injectable 4 milliGRAM(s) IV Push every 6 hours PRN Nausea and/or Vomiting  sodium chloride 0.9% lock flush 10 milliLiter(s) IV Push every 1 hour PRN After each medication administration  sodium chloride 0.9% lock flush 10 milliLiter(s) IV Push every 12 hours PRN Lumen of catheter NOT used    Vital Signs Last 24 Hrs  T(C): 35.8 (28 Nov 2017 08:45), Max: 37 (27 Nov 2017 17:04)  T(F): 96.4 (28 Nov 2017 08:45), Max: 98.6 (27 Nov 2017 17:04)  HR: 68 (28 Nov 2017 08:45) (61 - 75)  BP: 147/88 (28 Nov 2017 08:45) (133/81 - 150/83)  BP(mean): --  RR: 16 (28 Nov 2017 08:45) (16 - 17)  SpO2: 93% (28 Nov 2017 08:45) (93% - 96%)    PHYSICAL EXAM:  Constitutional:Well-developed, well nourished; non-toxic, NAD  Eyes:CL, EOMI  Ear/Nose/Throat: no oral lesion, no sinus tenderness on percussion	  Neck:no JVD, no lymphadenopathy, supple  Respiratory: CTA eileen  Cardiovascular: S1S2 RRR, no murmurs  Gastrointestinal:soft, (+) BS, no HSM  Extremities:no e/e/c  Vascular: DP Pulse:	right normal; left normal      LABS:                        10.0   5.9   )-----------( 294      ( 28 Nov 2017 07:08 )             33.5     11-28    143  |  105  |  38<H>  ----------------------------<  125<H>  4.4   |  27  |  0.64    Ca    10.0      28 Nov 2017 07:08  Phos  3.8     11-28  Mg     2.1     11-28      PT/INR - ( 28 Nov 2017 07:08 )   PT: 13.6 sec;   INR: 1.22          PTT - ( 28 Nov 2017 07:08 )  PTT:39.4 sec      MICROBIOLOGY:    RADIOLOGY & ADDITIONAL STUDIES: ELODIA pending. INTERVAL HPI/OVERNIGHT EVENTS: BCx draw this AM.  Patient comfortable upon exam.    CONSTITUTIONAL:  Negative fever or chills, feels well, good appetite  EYES:  Negative  blurry vision or double vision  CARDIOVASCULAR:  Negative for chest pain or palpitations  RESPIRATORY:  Negative for cough, wheezing, or SOB   GASTROINTESTINAL:  Negative for nausea, vomiting, diarrhea, constipation, or abdominal pain  GENITOURINARY:  Negative frequency, urgency or dysuria  NEUROLOGIC:  No headache, confusion, dizziness, lightheadedness    ANTIBIOTICS/RELEVANT:    MEDICATIONS  (STANDING):  bismuth subsalicylate Liquid 30 milliLiter(s) Oral daily  calcitriol Injectable 1 MICROGram(s) IV Push <User Schedule>  collagenase Ointment 1 Application(s) Topical daily  cyanocobalamin Injectable 1000 MICROGram(s) SubCutaneous every 7 days  Dakins Solution - 1/2 Strength 1 Application(s) Topical daily  DAPTOmycin IVPB 800 milliGRAM(s) IV Intermittent every 24 hours  dextrose 5%. 1000 milliLiter(s) (50 mL/Hr) IV Continuous <Continuous>  dextrose 50% Injectable 25 Gram(s) IV Push once  dextrose 50% Injectable 12.5 Gram(s) IV Push once  diazepam    Tablet 5 milliGRAM(s) Oral at bedtime  diphenhydrAMINE   Injectable 50 milliGRAM(s) IV Push at bedtime  enoxaparin Injectable 30 milliGRAM(s) SubCutaneous two times a day  escitalopram 20 milliGRAM(s) Oral daily  gabapentin 100 milliGRAM(s) Oral two times a day  heparin  flush 100 Units/mL Injectable 100 Unit(s) IV Push every other day  insulin lispro (HumaLOG) corrective regimen sliding scale   SubCutaneous every 6 hours  lidocaine   Patch 1 Patch Transdermal daily  losartan 50 milliGRAM(s) Oral daily  nystatin Powder 1 Application(s) Topical two times a day  octreotide  Injectable 450 MICROGram(s) IV Push daily  oxybutynin 10 milliGRAM(s) Oral two times a day  pantoprazole  Injectable 40 milliGRAM(s) IV Push daily  Parenteral Nutrition - Adult 1 Each (73 mL/Hr) TPN Continuous <Continuous>  Parenteral Nutrition - Adult 1 Each (73 mL/Hr) TPN Continuous <Continuous>  sodium chloride 0.9% lock flush 20 milliLiter(s) IV Push once    MEDICATIONS  (PRN):  acetaminophen   Tablet. 325 milliGRAM(s) Oral every 4 hours PRN Moderate Pain (4 - 6)  ondansetron Injectable 4 milliGRAM(s) IV Push every 6 hours PRN Nausea and/or Vomiting  sodium chloride 0.9% lock flush 10 milliLiter(s) IV Push every 1 hour PRN After each medication administration  sodium chloride 0.9% lock flush 10 milliLiter(s) IV Push every 12 hours PRN Lumen of catheter NOT used    Vital Signs Last 24 Hrs  T(C): 35.8 (28 Nov 2017 08:45), Max: 37 (27 Nov 2017 17:04)  T(F): 96.4 (28 Nov 2017 08:45), Max: 98.6 (27 Nov 2017 17:04)  HR: 68 (28 Nov 2017 08:45) (61 - 75)  BP: 147/88 (28 Nov 2017 08:45) (133/81 - 150/83)  BP(mean): --  RR: 16 (28 Nov 2017 08:45) (16 - 17)  SpO2: 93% (28 Nov 2017 08:45) (93% - 96%)    PHYSICAL EXAM:  Constitutional:Well-developed, well nourished; non-toxic, NAD  Eyes:CL, EOMI  Ear/Nose/Throat: no oral lesion, no sinus tenderness on percussion	  Neck: no JVD, no lymphadenopathy, supple. central line in place and clean  Respiratory: CTA bilaterally  Cardiovascular: S1S2 RRR, no murmurs  Gastrointestinal: ostomy bag in place, soft and non-tender  Extremities: no e/e/c  Vascular: DP Pulse:	right normal; left normal    LABS:                        10.0   5.9   )-----------( 294      ( 28 Nov 2017 07:08 )             33.5     11-28    143  |  105  |  38<H>  ----------------------------<  125<H>  4.4   |  27  |  0.64    Ca    10.0      28 Nov 2017 07:08  Phos  3.8     11-28  Mg     2.1     11-28      PT/INR - ( 28 Nov 2017 07:08 )   PT: 13.6 sec;   INR: 1.22          PTT - ( 28 Nov 2017 07:08 )  PTT:39.4 sec      MICROBIOLOGY:  Coag negative staph on surv. culture  Repeat surv. culture pending    RADIOLOGY & ADDITIONAL STUDIES:  ELODIA pending.

## 2017-11-29 LAB
ANION GAP SERPL CALC-SCNC: 11 MMOL/L — SIGNIFICANT CHANGE UP (ref 5–17)
BUN SERPL-MCNC: 40 MG/DL — HIGH (ref 7–23)
CALCIUM SERPL-MCNC: 9.8 MG/DL — SIGNIFICANT CHANGE UP (ref 8.4–10.5)
CHLORIDE SERPL-SCNC: 104 MMOL/L — SIGNIFICANT CHANGE UP (ref 96–108)
CO2 SERPL-SCNC: 27 MMOL/L — SIGNIFICANT CHANGE UP (ref 22–31)
CREAT SERPL-MCNC: 0.57 MG/DL — SIGNIFICANT CHANGE UP (ref 0.5–1.3)
GLUCOSE BLDC GLUCOMTR-MCNC: 107 MG/DL — HIGH (ref 70–99)
GLUCOSE BLDC GLUCOMTR-MCNC: 114 MG/DL — HIGH (ref 70–99)
GLUCOSE BLDC GLUCOMTR-MCNC: 121 MG/DL — HIGH (ref 70–99)
GLUCOSE BLDC GLUCOMTR-MCNC: 125 MG/DL — HIGH (ref 70–99)
GLUCOSE SERPL-MCNC: 132 MG/DL — HIGH (ref 70–99)
HCT VFR BLD CALC: 33.3 % — LOW (ref 34.5–45)
HGB BLD-MCNC: 9.8 G/DL — LOW (ref 11.5–15.5)
MAGNESIUM SERPL-MCNC: 2.2 MG/DL — SIGNIFICANT CHANGE UP (ref 1.6–2.6)
MCHC RBC-ENTMCNC: 26.9 PG — LOW (ref 27–34)
MCHC RBC-ENTMCNC: 29.4 G/DL — LOW (ref 32–36)
MCV RBC AUTO: 91.5 FL — SIGNIFICANT CHANGE UP (ref 80–100)
PHOSPHATE SERPL-MCNC: 3.6 MG/DL — SIGNIFICANT CHANGE UP (ref 2.5–4.5)
PLATELET # BLD AUTO: 294 K/UL — SIGNIFICANT CHANGE UP (ref 150–400)
POTASSIUM SERPL-MCNC: 4.4 MMOL/L — SIGNIFICANT CHANGE UP (ref 3.5–5.3)
POTASSIUM SERPL-SCNC: 4.4 MMOL/L — SIGNIFICANT CHANGE UP (ref 3.5–5.3)
RBC # BLD: 3.64 M/UL — LOW (ref 3.8–5.2)
RBC # FLD: 16.3 % — SIGNIFICANT CHANGE UP (ref 10.3–16.9)
SODIUM SERPL-SCNC: 142 MMOL/L — SIGNIFICANT CHANGE UP (ref 135–145)
WBC # BLD: 5.4 K/UL — SIGNIFICANT CHANGE UP (ref 3.8–10.5)
WBC # FLD AUTO: 5.4 K/UL — SIGNIFICANT CHANGE UP (ref 3.8–10.5)

## 2017-11-29 RX ORDER — I.V. FAT EMULSION 20 G/100ML
50 EMULSION INTRAVENOUS ONCE
Qty: 0 | Refills: 0 | Status: COMPLETED | OUTPATIENT
Start: 2017-11-29 | End: 2017-11-29

## 2017-11-29 RX ORDER — ELECTROLYTE SOLUTION,INJ
1 VIAL (ML) INTRAVENOUS
Qty: 0 | Refills: 0 | Status: DISCONTINUED | OUTPATIENT
Start: 2017-11-29 | End: 2017-11-29

## 2017-11-29 RX ORDER — HYDROMORPHONE HYDROCHLORIDE 2 MG/ML
0.25 INJECTION INTRAMUSCULAR; INTRAVENOUS; SUBCUTANEOUS ONCE
Qty: 0 | Refills: 0 | Status: DISCONTINUED | OUTPATIENT
Start: 2017-11-29 | End: 2017-11-29

## 2017-11-29 RX ADMIN — CALCITRIOL 1 MICROGRAM(S): 0.5 CAPSULE ORAL at 12:11

## 2017-11-29 RX ADMIN — PANTOPRAZOLE SODIUM 40 MILLIGRAM(S): 20 TABLET, DELAYED RELEASE ORAL at 06:05

## 2017-11-29 RX ADMIN — NYSTATIN CREAM 1 APPLICATION(S): 100000 CREAM TOPICAL at 18:34

## 2017-11-29 RX ADMIN — Medication 325 MILLIGRAM(S): at 07:30

## 2017-11-29 RX ADMIN — Medication 5 MILLIGRAM(S): at 21:12

## 2017-11-29 RX ADMIN — ESCITALOPRAM OXALATE 20 MILLIGRAM(S): 10 TABLET, FILM COATED ORAL at 21:12

## 2017-11-29 RX ADMIN — NYSTATIN CREAM 1 APPLICATION(S): 100000 CREAM TOPICAL at 07:01

## 2017-11-29 RX ADMIN — ONDANSETRON 4 MILLIGRAM(S): 8 TABLET, FILM COATED ORAL at 02:29

## 2017-11-29 RX ADMIN — Medication 325 MILLIGRAM(S): at 19:00

## 2017-11-29 RX ADMIN — HYDROMORPHONE HYDROCHLORIDE 0.25 MILLIGRAM(S): 2 INJECTION INTRAMUSCULAR; INTRAVENOUS; SUBCUTANEOUS at 19:13

## 2017-11-29 RX ADMIN — Medication 1 APPLICATION(S): at 12:22

## 2017-11-29 RX ADMIN — GABAPENTIN 100 MILLIGRAM(S): 400 CAPSULE ORAL at 06:05

## 2017-11-29 RX ADMIN — GABAPENTIN 100 MILLIGRAM(S): 400 CAPSULE ORAL at 18:33

## 2017-11-29 RX ADMIN — Medication 10 MILLIGRAM(S): at 18:33

## 2017-11-29 RX ADMIN — PREGABALIN 1000 MICROGRAM(S): 225 CAPSULE ORAL at 12:11

## 2017-11-29 RX ADMIN — Medication 325 MILLIGRAM(S): at 18:33

## 2017-11-29 RX ADMIN — LOSARTAN POTASSIUM 50 MILLIGRAM(S): 100 TABLET, FILM COATED ORAL at 06:05

## 2017-11-29 RX ADMIN — Medication 325 MILLIGRAM(S): at 12:10

## 2017-11-29 RX ADMIN — I.V. FAT EMULSION 20.83 GRAM(S): 20 EMULSION INTRAVENOUS at 21:30

## 2017-11-29 RX ADMIN — ONDANSETRON 4 MILLIGRAM(S): 8 TABLET, FILM COATED ORAL at 12:10

## 2017-11-29 RX ADMIN — ENOXAPARIN SODIUM 30 MILLIGRAM(S): 100 INJECTION SUBCUTANEOUS at 18:33

## 2017-11-29 RX ADMIN — Medication 10 MILLIGRAM(S): at 06:05

## 2017-11-29 RX ADMIN — ONDANSETRON 4 MILLIGRAM(S): 8 TABLET, FILM COATED ORAL at 18:33

## 2017-11-29 RX ADMIN — Medication 325 MILLIGRAM(S): at 13:00

## 2017-11-29 RX ADMIN — Medication 1 APPLICATION(S): at 07:01

## 2017-11-29 RX ADMIN — Medication 325 MILLIGRAM(S): at 03:29

## 2017-11-29 RX ADMIN — Medication 50 MILLIGRAM(S): at 21:15

## 2017-11-29 RX ADMIN — LIDOCAINE 1 PATCH: 4 CREAM TOPICAL at 12:12

## 2017-11-29 RX ADMIN — ENOXAPARIN SODIUM 30 MILLIGRAM(S): 100 INJECTION SUBCUTANEOUS at 06:04

## 2017-11-29 RX ADMIN — Medication 325 MILLIGRAM(S): at 02:29

## 2017-11-29 RX ADMIN — Medication 325 MILLIGRAM(S): at 06:30

## 2017-11-29 RX ADMIN — Medication 1 EACH: at 18:30

## 2017-11-29 RX ADMIN — DAPTOMYCIN 132 MILLIGRAM(S): 500 INJECTION, POWDER, LYOPHILIZED, FOR SOLUTION INTRAVENOUS at 19:13

## 2017-11-29 RX ADMIN — OCTREOTIDE ACETATE 450 MICROGRAM(S): 200 INJECTION, SOLUTION INTRAVENOUS; SUBCUTANEOUS at 12:11

## 2017-11-29 NOTE — PROGRESS NOTE ADULT - ATTENDING COMMENTS
ID attending    Afebrile; feculent material continues to drain from the LLQ fistula    ELODIA was negative for vegetation    Yesterday's  bld culture negative so far    Impression:    Case discussed with Dr. Burns, who will hold off on replacing the central line until we are sure that the most recent blood culture is negative for MRSA.    We have sent a fistula culture for MRSA to ascertain if that could have been a source.    We anticipate a 6-week course of daptomycin for recurrent MRSA bacteremia.    Dr. Carey assumes ID care tomorrow

## 2017-11-29 NOTE — PROGRESS NOTE ADULT - ASSESSMENT
57 F s/p  SBR, fistula resection, esophagojejunostomy revision w/ post-op course complicated by RTOR for distal anastomosis breakdown, ATN, acute respiratory failure, & septic shock, MRSA bacteremia which resolved. Currently for wound care management.   NPO with sips and chips /TPN/IVF   Pain/nausea control - only benadryl at midnight and dilaudid 0.25mg during PT session   ISS/OOB with PT daily  Nystatin powder, vancomycin 750 Q12 (10/11-11/8) ( Daptomycin 11/22-)  SCDs, lovenox, OOBA  Lines :  L left subclavian line (10/4-- )  AM labs, weekly albumin, pre-albumin and triglyceride (every friday)  Wet to dry dressing in place  NGT in fistula 57 F s/p  SBR, fistula resection, esophagojejunostomy revision w/ post-op course complicated by RTOR for distal anastomosis breakdown, ATN, acute respiratory failure, & septic shock, MRSA bacteremia which resolved. Currently for wound care management.   NPO with sips and chips /TPN/IVF   Pain/nausea control - only benadryl at midnight and dilaudid 0.25mg during PT session   ISS/OOB with PT daily  Nystatin powder, vancomycin 750 Q12 (10/11-11/8) ( Daptomycin 11/22-)  SCDs, lovenox, OOBA  Lines :  L left subclavian line (10/4-- )  AM labs, weekly albumin, pre-albumin and triglyceride (every friday)  Wet to dry dressing in place  NGT in fistula   continue to follow ID Recs

## 2017-11-29 NOTE — CONSULT NOTE ADULT - SUBJECTIVE AND OBJECTIVE BOX
57 F s/p  SBR, fistula resection, esophagojejunostomy revision w/ post-op course complicated by RTOR for distal anastomosis breakdown, ATN, acute respiratory failure, & septic shock, MRSA bacteremia which resolved. Currently taking PO and TPN.    MEDICATIONS  (STANDING):  bismuth subsalicylate Liquid 30 milliLiter(s) Oral daily  calcitriol Injectable 1 MICROGram(s) IV Push <User Schedule>  collagenase Ointment 1 Application(s) Topical daily  cyanocobalamin Injectable 1000 MICROGram(s) SubCutaneous every 7 days  Dakins Solution - 1/2 Strength 1 Application(s) Topical daily  DAPTOmycin IVPB 800 milliGRAM(s) IV Intermittent every 24 hours  dextrose 5%. 1000 milliLiter(s) (50 mL/Hr) IV Continuous <Continuous>  dextrose 50% Injectable 25 Gram(s) IV Push once  dextrose 50% Injectable 12.5 Gram(s) IV Push once  diazepam    Tablet 5 milliGRAM(s) Oral at bedtime  diphenhydrAMINE   Injectable 50 milliGRAM(s) IV Push at bedtime  enoxaparin Injectable 30 milliGRAM(s) SubCutaneous two times a day  escitalopram 20 milliGRAM(s) Oral daily  fat emulsion (Plant Based) 20% IVPB 50 Gram(s) IV Intermittent once  gabapentin 100 milliGRAM(s) Oral two times a day  heparin  flush 100 Units/mL Injectable 100 Unit(s) IV Push every other day  insulin lispro (HumaLOG) corrective regimen sliding scale   SubCutaneous every 6 hours  lidocaine   Patch 1 Patch Transdermal daily  losartan 50 milliGRAM(s) Oral daily  nystatin Powder 1 Application(s) Topical two times a day  octreotide  Injectable 450 MICROGram(s) IV Push daily  oxybutynin 10 milliGRAM(s) Oral two times a day  pantoprazole  Injectable 40 milliGRAM(s) IV Push daily  Parenteral Nutrition - Adult 1 Each (73 mL/Hr) TPN Continuous <Continuous>  Parenteral Nutrition - Adult 1 Each (73 mL/Hr) TPN Continuous <Continuous>  sodium chloride 0.9% lock flush 20 milliLiter(s) IV Push once    MEDICATIONS  (PRN):  acetaminophen   Tablet. 325 milliGRAM(s) Oral every 4 hours PRN Moderate Pain (4 - 6)  ondansetron Injectable 4 milliGRAM(s) IV Push every 6 hours PRN Nausea and/or Vomiting  sodium chloride 0.9% lock flush 10 milliLiter(s) IV Push every 1 hour PRN After each medication administration  sodium chloride 0.9% lock flush 10 milliLiter(s) IV Push every 12 hours PRN Lumen of catheter NOT used    PAST MEDICAL & SURGICAL HISTORY:  Reflux  Obesity  DVT (deep venous thrombosis): 2013 neck  Diverticulitis  Hypertension  Elective surgery: abodominal wall surgery  dec 2016  Elective surgery: NOV 2016 gastric bypass revision  Gastric bypass status for obesity: gastric sleeve, 6/2012  S/P breast biopsy: 2011, left  S/P colon resection: 2011  S/P knee surgery: repair 2009, 2011  right; left  Umbilical hernia: 2000  S/P appendectomy: 1975  S/P tonsillectomy: 1967      7/24: SBR resection, fistula resection, revision of esophagojejunostomy drain through esophageal hiatus in R mediastinum  7/29: Ex-lap, SB anastamosis in BP limb breakdown with succus throughout abdomen  8/1: abd washout, drainage of abscess, biologic mesh placement, closure of abdominal wall, vac and retention suture  8/7: abd washout, mesh removal, frozen abd noted, LLQ CHECO replaced with benson drain  8/10: leak noted from previous anastamosis site on L side, mid abdomen malecot drain placed in enterotomy, JPs x 2 placed, necrotic fascia debrided, vicryl mesh sutured to fascia circumferentially, xeroform over mesh then vac dressing placed    Allergies    sulfa drugs (Unknown)  sulfamethoxazole (Other)    Intolerances    Physical exam  Vital Signs Last 24 Hrs  T(C): 36.3 (11-29-17 @ 08:30), Max: 36.9 (11-28-17 @ 18:07)  T(F): 97.4 (11-29-17 @ 08:30), Max: 98.5 (11-28-17 @ 18:07)  HR: 60 (11-29-17 @ 08:30) (60 - 70)  BP: 126/83 (11-29-17 @ 08:30) (120/79 - 138/89)  BP(mean): --  RR: 16 (11-29-17 @ 08:30) (16 - 17)  SpO2: 96% (11-29-17 @ 08:30) (94% - 98%)  I&O's Detail    28 Nov 2017 07:01  -  29 Nov 2017 07:00  --------------------------------------------------------  IN:    TPN (Total Parenteral Nutrition): 1533 mL  Total IN: 1533 mL    OUT:    Drain: 25 mL  Total OUT: 25 mL    Total NET: 1508 mL      29 Nov 2017 07:01  -  29 Nov 2017 10:41  --------------------------------------------------------  IN:    TPN (Total Parenteral Nutrition): 73 mL  Total IN: 73 mL    OUT:  Total OUT: 0 mL    Total NET: 73 mL      NAD, Alert and oriented  Morbidly obese  Open abdomen bathed in succus, covered by large plastic bag, with NG tube entering wound but not sealed. Smell of GI contents, not well digested  Bowel not appreciated under succus 57 F s/p  SBR, fistula resection, esophagojejunostomy revision w/ post-op course complicated by RTOR for distal anastomosis breakdown, ATN, acute respiratory failure, & septic shock, MRSA bacteremia which resolved. Currently taking PO and TPN.    MEDICATIONS  (STANDING):  bismuth subsalicylate Liquid 30 milliLiter(s) Oral daily  calcitriol Injectable 1 MICROGram(s) IV Push <User Schedule>  collagenase Ointment 1 Application(s) Topical daily  cyanocobalamin Injectable 1000 MICROGram(s) SubCutaneous every 7 days  Dakins Solution - 1/2 Strength 1 Application(s) Topical daily  DAPTOmycin IVPB 800 milliGRAM(s) IV Intermittent every 24 hours  dextrose 5%. 1000 milliLiter(s) (50 mL/Hr) IV Continuous <Continuous>  dextrose 50% Injectable 25 Gram(s) IV Push once  dextrose 50% Injectable 12.5 Gram(s) IV Push once  diazepam    Tablet 5 milliGRAM(s) Oral at bedtime  diphenhydrAMINE   Injectable 50 milliGRAM(s) IV Push at bedtime  enoxaparin Injectable 30 milliGRAM(s) SubCutaneous two times a day  escitalopram 20 milliGRAM(s) Oral daily  fat emulsion (Plant Based) 20% IVPB 50 Gram(s) IV Intermittent once  gabapentin 100 milliGRAM(s) Oral two times a day  heparin  flush 100 Units/mL Injectable 100 Unit(s) IV Push every other day  insulin lispro (HumaLOG) corrective regimen sliding scale   SubCutaneous every 6 hours  lidocaine   Patch 1 Patch Transdermal daily  losartan 50 milliGRAM(s) Oral daily  nystatin Powder 1 Application(s) Topical two times a day  octreotide  Injectable 450 MICROGram(s) IV Push daily  oxybutynin 10 milliGRAM(s) Oral two times a day  pantoprazole  Injectable 40 milliGRAM(s) IV Push daily  Parenteral Nutrition - Adult 1 Each (73 mL/Hr) TPN Continuous <Continuous>  Parenteral Nutrition - Adult 1 Each (73 mL/Hr) TPN Continuous <Continuous>  sodium chloride 0.9% lock flush 20 milliLiter(s) IV Push once    MEDICATIONS  (PRN):  acetaminophen   Tablet. 325 milliGRAM(s) Oral every 4 hours PRN Moderate Pain (4 - 6)  ondansetron Injectable 4 milliGRAM(s) IV Push every 6 hours PRN Nausea and/or Vomiting  sodium chloride 0.9% lock flush 10 milliLiter(s) IV Push every 1 hour PRN After each medication administration  sodium chloride 0.9% lock flush 10 milliLiter(s) IV Push every 12 hours PRN Lumen of catheter NOT used    PAST MEDICAL & SURGICAL HISTORY:  Reflux  Obesity  DVT (deep venous thrombosis): 2013 neck  Diverticulitis  Hypertension  Elective surgery: abodominal wall surgery  dec 2016  Elective surgery: NOV 2016 gastric bypass revision  Gastric bypass status for obesity: gastric sleeve, 6/2012  S/P breast biopsy: 2011, left  S/P colon resection: 2011  S/P knee surgery: repair 2009, 2011  right; left  Umbilical hernia: 2000  S/P appendectomy: 1975  S/P tonsillectomy: 1967      7/24: SBR resection, fistula resection, revision of esophagojejunostomy drain through esophageal hiatus in R mediastinum  7/29: Ex-lap, SB anastamosis in BP limb breakdown with succus throughout abdomen  8/1: abd washout, drainage of abscess, biologic mesh placement, closure of abdominal wall, vac and retention suture  8/7: abd washout, mesh removal, frozen abd noted, LLQ CHECO replaced with benson drain  8/10: leak noted from previous anastamosis site on L side, mid abdomen malecot drain placed in enterotomy, JPs x 2 placed, necrotic fascia debrided, vicryl mesh sutured to fascia circumferentially, xeroform over mesh then vac dressing placed    Allergies    sulfa drugs (Unknown)  sulfamethoxazole (Other)    Intolerances    Physical exam  Vital Signs Last 24 Hrs  T(C): 36.3 (11-29-17 @ 08:30), Max: 36.9 (11-28-17 @ 18:07)  T(F): 97.4 (11-29-17 @ 08:30), Max: 98.5 (11-28-17 @ 18:07)  HR: 60 (11-29-17 @ 08:30) (60 - 70)  BP: 126/83 (11-29-17 @ 08:30) (120/79 - 138/89)  BP(mean): --  RR: 16 (11-29-17 @ 08:30) (16 - 17)  SpO2: 96% (11-29-17 @ 08:30) (94% - 98%)  I&O's Detail    28 Nov 2017 07:01  -  29 Nov 2017 07:00  --------------------------------------------------------  IN:    TPN (Total Parenteral Nutrition): 1533 mL  Total IN: 1533 mL    OUT:    Drain: 25 mL  Total OUT: 25 mL    Total NET: 1508 mL      29 Nov 2017 07:01  -  29 Nov 2017 10:41  --------------------------------------------------------  IN:    TPN (Total Parenteral Nutrition): 73 mL  Total IN: 73 mL    OUT:  Total OUT: 0 mL    Total NET: 73 mL      NAD, Alert and oriented  Morbidly obese  Open abdomen bathed in succus, covered by large plastic bag, with NG tube entering wound but not sealed. Smell of GI contents, not well digested  Bowel not appreciated under succus                          9.8    5.4   )-----------( 294      ( 29 Nov 2017 06:21 )             33.3     29 Nov 2017 06:21    142    |  104    |  40     ----------------------------<  132    4.4     |  27     |  0.57     Ca    9.8        29 Nov 2017 06:21  Phos  3.6       29 Nov 2017 06:21  Mg     2.2       29 Nov 2017 06:21        PT/INR - ( 28 Nov 2017 07:08 )   PT: 13.6 sec;   INR: 1.22          PTT - ( 28 Nov 2017 07:08 )  PTT:39.4 sec  CAPILLARY BLOOD GLUCOSE      POCT Blood Glucose.: 125 mg/dL (29 Nov 2017 05:38)  POCT Blood Glucose.: 121 mg/dL (28 Nov 2017 23:58)  POCT Blood Glucose.: 112 mg/dL (28 Nov 2017 17:33)  POCT Blood Glucose.: 118 mg/dL (28 Nov 2017 12:15)        < from: CT Abdomen and Pelvis w/ Oral Cont and w/ IV Cont (10.03.17 @ 15:54) >  IMPRESSION:  1.  Possible small amount of thrombus around the left internal jugular   line.  2.  Probable developing decubitus ulcer. Clinical examination of this   site is recommended.  3.  Marked improvement in the intra-abdominal pathology since 8/4/2017.  4.  Contained leak from gastric pouch again noted with markedly decreased   amount of fluid between the gastric pouch and spleen. Marked decrease in   the other intra-abdominal fluid collections.  5.  Small left pleural effusion. Bilateral basilar atelectatic change.    < end of copied text >

## 2017-11-29 NOTE — PROGRESS NOTE ADULT - ASSESSMENT
Patient resting comfortably upon exam.  Surv cultures have shown NGTD, ELODIA negative.  Will send a swab of fistula site to evaluate for source of MRSA bacteremia.  Pending finalized negative BCx, can proceed w/ switching out central line.  Otherwise, will plan to continue w/ daptomycin pending source determination.  - Rec c/w daptomycin  - F/u culture swab of fistula site  - Will plan on new TLC pending final negative Bcx  - ID will continue to follow

## 2017-11-29 NOTE — PROGRESS NOTE ADULT - SUBJECTIVE AND OBJECTIVE BOX
INTERVAL HPI/OVERNIGHT EVENTS:    CONSTITUTIONAL:  Negative fever or chills, feels well, good appetite  EYES:  Negative  blurry vision or double vision  CARDIOVASCULAR:  Negative for chest pain or palpitations  RESPIRATORY:  Negative for cough, wheezing, or SOB   GASTROINTESTINAL:  Negative for nausea, vomiting, diarrhea, constipation, or abdominal pain  GENITOURINARY:  Negative frequency, urgency or dysuria  NEUROLOGIC:  No headache, confusion, dizziness, lightheadedness      ANTIBIOTICS/RELEVANT:    MEDICATIONS  (STANDING):  bismuth subsalicylate Liquid 30 milliLiter(s) Oral daily  calcitriol Injectable 1 MICROGram(s) IV Push <User Schedule>  collagenase Ointment 1 Application(s) Topical daily  cyanocobalamin Injectable 1000 MICROGram(s) SubCutaneous every 7 days  Dakins Solution - 1/2 Strength 1 Application(s) Topical daily  DAPTOmycin IVPB 800 milliGRAM(s) IV Intermittent every 24 hours  dextrose 5%. 1000 milliLiter(s) (50 mL/Hr) IV Continuous <Continuous>  dextrose 50% Injectable 25 Gram(s) IV Push once  dextrose 50% Injectable 12.5 Gram(s) IV Push once  diazepam    Tablet 5 milliGRAM(s) Oral at bedtime  diphenhydrAMINE   Injectable 50 milliGRAM(s) IV Push at bedtime  enoxaparin Injectable 30 milliGRAM(s) SubCutaneous two times a day  escitalopram 20 milliGRAM(s) Oral daily  fat emulsion (Plant Based) 20% IVPB 50 Gram(s) IV Intermittent once  gabapentin 100 milliGRAM(s) Oral two times a day  heparin  flush 100 Units/mL Injectable 100 Unit(s) IV Push every other day  insulin lispro (HumaLOG) corrective regimen sliding scale   SubCutaneous every 6 hours  lidocaine   Patch 1 Patch Transdermal daily  losartan 50 milliGRAM(s) Oral daily  nystatin Powder 1 Application(s) Topical two times a day  octreotide  Injectable 450 MICROGram(s) IV Push daily  oxybutynin 10 milliGRAM(s) Oral two times a day  pantoprazole  Injectable 40 milliGRAM(s) IV Push daily  Parenteral Nutrition - Adult 1 Each (73 mL/Hr) TPN Continuous <Continuous>  Parenteral Nutrition - Adult 1 Each (73 mL/Hr) TPN Continuous <Continuous>  sodium chloride 0.9% lock flush 20 milliLiter(s) IV Push once    MEDICATIONS  (PRN):  acetaminophen   Tablet. 325 milliGRAM(s) Oral every 4 hours PRN Moderate Pain (4 - 6)  ondansetron Injectable 4 milliGRAM(s) IV Push every 6 hours PRN Nausea and/or Vomiting  sodium chloride 0.9% lock flush 10 milliLiter(s) IV Push every 1 hour PRN After each medication administration  sodium chloride 0.9% lock flush 10 milliLiter(s) IV Push every 12 hours PRN Lumen of catheter NOT used        Vital Signs Last 24 Hrs  T(C): 36.3 (29 Nov 2017 08:30), Max: 36.9 (28 Nov 2017 18:07)  T(F): 97.4 (29 Nov 2017 08:30), Max: 98.5 (28 Nov 2017 18:07)  HR: 60 (29 Nov 2017 08:30) (60 - 70)  BP: 126/83 (29 Nov 2017 08:30) (120/79 - 138/89)  BP(mean): --  RR: 16 (29 Nov 2017 08:30) (16 - 17)  SpO2: 96% (29 Nov 2017 08:30) (94% - 98%)    PHYSICAL EXAM:  Constitutional:Well-developed, well nourished  Eyes:CL, EOMI  Ear/Nose/Throat: no oral lesion, no sinus tenderness on percussion	  Neck:no JVD, no lymphadenopathy, supple  Respiratory: CTA eileen  Cardiovascular: S1S2 RRR, no murmurs  Gastrointestinal:soft, (+) BS, no HSM  Extremities:no e/e/c  Vascular: DP Pulse:	right normal; left normal      LABS:                        9.8    5.4   )-----------( 294      ( 29 Nov 2017 06:21 )             33.3     11-29    142  |  104  |  40<H>  ----------------------------<  132<H>  4.4   |  27  |  0.57    Ca    9.8      29 Nov 2017 06:21  Phos  3.6     11-29  Mg     2.2     11-29      PT/INR - ( 28 Nov 2017 07:08 )   PT: 13.6 sec;   INR: 1.22          PTT - ( 28 Nov 2017 07:08 )  PTT:39.4 sec      MICROBIOLOGY:    RADIOLOGY & ADDITIONAL STUDIES: INTERVAL HPI/OVERNIGHT EVENTS:  BRAYDEN; ELODIA completed    CONSTITUTIONAL:  Negative fever or chills, feels well, good appetite  EYES:  Negative  blurry vision or double vision  CARDIOVASCULAR:  Negative for chest pain or palpitations  RESPIRATORY:  Negative for cough, wheezing, or SOB   GASTROINTESTINAL:  Negative for nausea, vomiting, diarrhea, constipation, or abdominal pain  GENITOURINARY:  Negative frequency, urgency or dysuria  NEUROLOGIC:  No headache, confusion, dizziness, lightheadedness      ANTIBIOTICS/RELEVANT:    MEDICATIONS  (STANDING):  bismuth subsalicylate Liquid 30 milliLiter(s) Oral daily  calcitriol Injectable 1 MICROGram(s) IV Push <User Schedule>  collagenase Ointment 1 Application(s) Topical daily  cyanocobalamin Injectable 1000 MICROGram(s) SubCutaneous every 7 days  Dakins Solution - 1/2 Strength 1 Application(s) Topical daily  DAPTOmycin IVPB 800 milliGRAM(s) IV Intermittent every 24 hours  dextrose 5%. 1000 milliLiter(s) (50 mL/Hr) IV Continuous <Continuous>  dextrose 50% Injectable 25 Gram(s) IV Push once  dextrose 50% Injectable 12.5 Gram(s) IV Push once  diazepam    Tablet 5 milliGRAM(s) Oral at bedtime  diphenhydrAMINE   Injectable 50 milliGRAM(s) IV Push at bedtime  enoxaparin Injectable 30 milliGRAM(s) SubCutaneous two times a day  escitalopram 20 milliGRAM(s) Oral daily  fat emulsion (Plant Based) 20% IVPB 50 Gram(s) IV Intermittent once  gabapentin 100 milliGRAM(s) Oral two times a day  heparin  flush 100 Units/mL Injectable 100 Unit(s) IV Push every other day  insulin lispro (HumaLOG) corrective regimen sliding scale   SubCutaneous every 6 hours  lidocaine   Patch 1 Patch Transdermal daily  losartan 50 milliGRAM(s) Oral daily  nystatin Powder 1 Application(s) Topical two times a day  octreotide  Injectable 450 MICROGram(s) IV Push daily  oxybutynin 10 milliGRAM(s) Oral two times a day  pantoprazole  Injectable 40 milliGRAM(s) IV Push daily  Parenteral Nutrition - Adult 1 Each (73 mL/Hr) TPN Continuous <Continuous>  Parenteral Nutrition - Adult 1 Each (73 mL/Hr) TPN Continuous <Continuous>  sodium chloride 0.9% lock flush 20 milliLiter(s) IV Push once    MEDICATIONS  (PRN):  acetaminophen   Tablet. 325 milliGRAM(s) Oral every 4 hours PRN Moderate Pain (4 - 6)  ondansetron Injectable 4 milliGRAM(s) IV Push every 6 hours PRN Nausea and/or Vomiting  sodium chloride 0.9% lock flush 10 milliLiter(s) IV Push every 1 hour PRN After each medication administration  sodium chloride 0.9% lock flush 10 milliLiter(s) IV Push every 12 hours PRN Lumen of catheter NOT used        Vital Signs Last 24 Hrs  T(C): 36.3 (29 Nov 2017 08:30), Max: 36.9 (28 Nov 2017 18:07)  T(F): 97.4 (29 Nov 2017 08:30), Max: 98.5 (28 Nov 2017 18:07)  HR: 60 (29 Nov 2017 08:30) (60 - 70)  BP: 126/83 (29 Nov 2017 08:30) (120/79 - 138/89)  BP(mean): --  RR: 16 (29 Nov 2017 08:30) (16 - 17)  SpO2: 96% (29 Nov 2017 08:30) (94% - 98%)    PHYSICAL EXAM:  Constitutional:Well-developed, well nourished  Eyes:CL, EOMI  Ear/Nose/Throat: no oral lesion, no sinus tenderness on percussion	  Neck:no JVD, no lymphadenopathy, supple  Respiratory: CTA eileen  Cardiovascular: S1S2 RRR, no murmurs  Gastrointestinal:soft, (+) BS, no HSM  Extremities:no e/e/c  Vascular: DP Pulse:	right normal; left normal      LABS:                        9.8    5.4   )-----------( 294      ( 29 Nov 2017 06:21 )             33.3     11-29    142  |  104  |  40<H>  ----------------------------<  132<H>  4.4   |  27  |  0.57    Ca    9.8      29 Nov 2017 06:21  Phos  3.6     11-29  Mg     2.2     11-29      PT/INR - ( 28 Nov 2017 07:08 )   PT: 13.6 sec;   INR: 1.22          PTT - ( 28 Nov 2017 07:08 )  PTT:39.4 sec      MICROBIOLOGY:    RADIOLOGY & ADDITIONAL STUDIES: INTERVAL HPI/OVERNIGHT EVENTS:  BRAYDEN; ELODIA completed    CONSTITUTIONAL:  Negative fever or chills, feels well, good appetite  EYES:  Negative  blurry vision or double vision  CARDIOVASCULAR:  Negative for chest pain or palpitations  RESPIRATORY:  Negative for cough, wheezing, or SOB   GASTROINTESTINAL:  Negative for nausea, vomiting, diarrhea, constipation, or abdominal pain  GENITOURINARY:  Negative frequency, urgency or dysuria  NEUROLOGIC:  No headache, confusion, dizziness, lightheadedness      ANTIBIOTICS/RELEVANT:    MEDICATIONS  (STANDING):  bismuth subsalicylate Liquid 30 milliLiter(s) Oral daily  calcitriol Injectable 1 MICROGram(s) IV Push <User Schedule>  collagenase Ointment 1 Application(s) Topical daily  cyanocobalamin Injectable 1000 MICROGram(s) SubCutaneous every 7 days  Dakins Solution - 1/2 Strength 1 Application(s) Topical daily  DAPTOmycin IVPB 800 milliGRAM(s) IV Intermittent every 24 hours  dextrose 5%. 1000 milliLiter(s) (50 mL/Hr) IV Continuous <Continuous>  dextrose 50% Injectable 25 Gram(s) IV Push once  dextrose 50% Injectable 12.5 Gram(s) IV Push once  diazepam    Tablet 5 milliGRAM(s) Oral at bedtime  diphenhydrAMINE   Injectable 50 milliGRAM(s) IV Push at bedtime  enoxaparin Injectable 30 milliGRAM(s) SubCutaneous two times a day  escitalopram 20 milliGRAM(s) Oral daily  fat emulsion (Plant Based) 20% IVPB 50 Gram(s) IV Intermittent once  gabapentin 100 milliGRAM(s) Oral two times a day  heparin  flush 100 Units/mL Injectable 100 Unit(s) IV Push every other day  insulin lispro (HumaLOG) corrective regimen sliding scale   SubCutaneous every 6 hours  lidocaine   Patch 1 Patch Transdermal daily  losartan 50 milliGRAM(s) Oral daily  nystatin Powder 1 Application(s) Topical two times a day  octreotide  Injectable 450 MICROGram(s) IV Push daily  oxybutynin 10 milliGRAM(s) Oral two times a day  pantoprazole  Injectable 40 milliGRAM(s) IV Push daily  Parenteral Nutrition - Adult 1 Each (73 mL/Hr) TPN Continuous <Continuous>  Parenteral Nutrition - Adult 1 Each (73 mL/Hr) TPN Continuous <Continuous>  sodium chloride 0.9% lock flush 20 milliLiter(s) IV Push once    MEDICATIONS  (PRN):  acetaminophen   Tablet. 325 milliGRAM(s) Oral every 4 hours PRN Moderate Pain (4 - 6)  ondansetron Injectable 4 milliGRAM(s) IV Push every 6 hours PRN Nausea and/or Vomiting  sodium chloride 0.9% lock flush 10 milliLiter(s) IV Push every 1 hour PRN After each medication administration  sodium chloride 0.9% lock flush 10 milliLiter(s) IV Push every 12 hours PRN Lumen of catheter NOT used        Vital Signs Last 24 Hrs  T(C): 36.3 (29 Nov 2017 08:30), Max: 36.9 (28 Nov 2017 18:07)  T(F): 97.4 (29 Nov 2017 08:30), Max: 98.5 (28 Nov 2017 18:07)  HR: 60 (29 Nov 2017 08:30) (60 - 70)  BP: 126/83 (29 Nov 2017 08:30) (120/79 - 138/89)  BP(mean): --  RR: 16 (29 Nov 2017 08:30) (16 - 17)  SpO2: 96% (29 Nov 2017 08:30) (94% - 98%)    PHYSICAL EXAM:  Constitutional: AAOx3; NAD    Eyes: PERRL / EOMI  Ear/Nose/Throat: no oral lesion, no sinus tenderness on percussion	  Neck:no JVD, no lymphadenopathy, supple  Respiratory: CTA B/L  Cardiovascular: S1S2 RRR  Gastrointestinal: soft, (+) BS, no HSM; brown stool is ostomy fisutla bag  Extremities: No erythema / edema/ cyanosis; L subclavian TLC in place and clean  Vascular: DP Pulse:	right normal; left normal    LABS:                        9.8    5.4   )-----------( 294      ( 29 Nov 2017 06:21 )             33.3     11-29    142  |  104  |  40<H>  ----------------------------<  132<H>  4.4   |  27  |  0.57    Ca    9.8      29 Nov 2017 06:21  Phos  3.6     11-29  Mg     2.2     11-29      PT/INR - ( 28 Nov 2017 07:08 )   PT: 13.6 sec;   INR: 1.22          PTT - ( 28 Nov 2017 07:08 )  PTT:39.4 sec      RADIOLOGY & ADDITIONAL STUDIES:  ELODIA reviewed and negative for endocarditis

## 2017-11-29 NOTE — CONSULT NOTE ADULT - ASSESSMENT
57F with abdominal wall breakdown, multiple fistulas, Vicryl mesh which is likely dissolved at this point  -Will be available if primary team elects to surgically manage her fistulas  -Currently no plan to operate in the near future from primary team  -Please notify plastics team if surgical plan develops  -Recommend minimize PO intake  -Fistula management  -seen and examined with Dr. Vazquez

## 2017-11-29 NOTE — PROGRESS NOTE ADULT - SUBJECTIVE AND OBJECTIVE BOX
O/N: dressing reinforced. VSS. BRAYDEN  11/28: blood culture drawn , ELODIA performed no endocarditis ,per ID Do not remove the line until all cultures are negative OVERNIGHT EVENTS: dressing reinforced. VSS. BRAYDEN  11/28: blood culture drawn , ELODIA performed no endocarditis ,per ID Do not remove the line until all cultures are negativ    STATUS POST:    7/24: SBR resection, fistula resection, revision of esophagogegunostomy drain through esophageal hiatus in R mediastinum  7/29: Ex-lap, SB anastamosis in BP limb breakdown with succus throughout abdomen  8/1: abd washout, drainage of abscess, biologic mesh placement, closure of abdominal wall, vac and retention suture  8/7: abd washout, mesh removal, frozen abd noted, LLQ CHECO replaced with benson drain  8/10: leak noted from previous anastamosis site on L side, mid abdomen malecot drain placed in enterotomy, JPs x 2 placed, necrotic fascia debrided, vicryl mesh sutured to fascia circumferentially, xeroform over mesh then vac dressing placed	         SUBJECTIVE: Patient denies any complaints.  Flatus: [] YES [X] NO             Bowel Movement: [ ] YES [X ] NO  Pain (0-10):            Pain Control Adequate: [X ] YES [ ] NO  Nausea: [ ] YES [X ] NO            Vomiting: [ ] YES [ X] NO  Diarrhea: [ ] YES [ X] NO         Constipation: [ ] YES [X ] NO     Chest Pain: [ ] YES [X ] NO    SOB:  [ ] YES [X ] NO    DAPTOmycin IVPB 800 milliGRAM(s) IV Intermittent every 24 hours  enoxaparin Injectable 30 milliGRAM(s) SubCutaneous two times a day  heparin  flush 100 Units/mL Injectable 100 Unit(s) IV Push every other day  losartan 50 milliGRAM(s) Oral daily      Vital Signs Last 24 Hrs  T(C): 36.4 (29 Nov 2017 05:51), Max: 36.9 (28 Nov 2017 18:07)  T(F): 97.5 (29 Nov 2017 05:51), Max: 98.5 (28 Nov 2017 18:07)  HR: 63 (29 Nov 2017 05:51) (63 - 70)  BP: 138/89 (29 Nov 2017 05:51) (120/79 - 147/88)  BP(mean): --  RR: 16 (29 Nov 2017 05:51) (16 - 17)  SpO2: 95% (29 Nov 2017 05:51) (93% - 98%)  I&O's Detail    28 Nov 2017 07:01  -  29 Nov 2017 07:00  --------------------------------------------------------  IN:    TPN (Total Parenteral Nutrition): 1533 mL  Total IN: 1533 mL    OUT:    Drain: 25 mL  Total OUT: 25 mL    Total NET: 1508 mL      29 Nov 2017 07:01  -  29 Nov 2017 07:50  --------------------------------------------------------  IN:    TPN (Total Parenteral Nutrition): 73 mL  Total IN: 73 mL    OUT:  Total OUT: 0 mL    Total NET: 73 mL          General: NAD, resting comfortably in bed  C/V: NSR  Pulm: Nonlabored breathing, no respiratory distress  Abd: soft, NT/ND. dressing clean and dry. wound has healthy granulation tissue  Extrem: WWP, no edema, SCDs in place        LABS:                        9.8    5.4   )-----------( 294      ( 29 Nov 2017 06:21 )             33.3     11-29    142  |  104  |  40<H>  ----------------------------<  132<H>  4.4   |  27  |  0.57    Ca    9.8      29 Nov 2017 06:21  Phos  3.6     11-29  Mg     2.2     11-29      PT/INR - ( 28 Nov 2017 07:08 )   PT: 13.6 sec;   INR: 1.22          PTT - ( 28 Nov 2017 07:08 )  PTT:39.4 sec      RADIOLOGY & ADDITIONAL STUDIES:

## 2017-11-30 LAB
ANION GAP SERPL CALC-SCNC: 13 MMOL/L — SIGNIFICANT CHANGE UP (ref 5–17)
BUN SERPL-MCNC: 38 MG/DL — HIGH (ref 7–23)
CALCIUM SERPL-MCNC: 9.7 MG/DL — SIGNIFICANT CHANGE UP (ref 8.4–10.5)
CHLORIDE SERPL-SCNC: 101 MMOL/L — SIGNIFICANT CHANGE UP (ref 96–108)
CO2 SERPL-SCNC: 26 MMOL/L — SIGNIFICANT CHANGE UP (ref 22–31)
CREAT SERPL-MCNC: 0.56 MG/DL — SIGNIFICANT CHANGE UP (ref 0.5–1.3)
GLUCOSE BLDC GLUCOMTR-MCNC: 106 MG/DL — HIGH (ref 70–99)
GLUCOSE BLDC GLUCOMTR-MCNC: 115 MG/DL — HIGH (ref 70–99)
GLUCOSE BLDC GLUCOMTR-MCNC: 115 MG/DL — HIGH (ref 70–99)
GLUCOSE BLDC GLUCOMTR-MCNC: 129 MG/DL — HIGH (ref 70–99)
GLUCOSE BLDC GLUCOMTR-MCNC: 129 MG/DL — HIGH (ref 70–99)
GLUCOSE SERPL-MCNC: 119 MG/DL — HIGH (ref 70–99)
HCT VFR BLD CALC: 33.6 % — LOW (ref 34.5–45)
HGB BLD-MCNC: 10.1 G/DL — LOW (ref 11.5–15.5)
MAGNESIUM SERPL-MCNC: 2 MG/DL — SIGNIFICANT CHANGE UP (ref 1.6–2.6)
MCHC RBC-ENTMCNC: 27.3 PG — SIGNIFICANT CHANGE UP (ref 27–34)
MCHC RBC-ENTMCNC: 30.1 G/DL — LOW (ref 32–36)
MCV RBC AUTO: 90.8 FL — SIGNIFICANT CHANGE UP (ref 80–100)
PHOSPHATE SERPL-MCNC: 3.5 MG/DL — SIGNIFICANT CHANGE UP (ref 2.5–4.5)
PLATELET # BLD AUTO: 300 K/UL — SIGNIFICANT CHANGE UP (ref 150–400)
POTASSIUM SERPL-MCNC: 4.2 MMOL/L — SIGNIFICANT CHANGE UP (ref 3.5–5.3)
POTASSIUM SERPL-SCNC: 4.2 MMOL/L — SIGNIFICANT CHANGE UP (ref 3.5–5.3)
RBC # BLD: 3.7 M/UL — LOW (ref 3.8–5.2)
RBC # FLD: 16.2 % — SIGNIFICANT CHANGE UP (ref 10.3–16.9)
SODIUM SERPL-SCNC: 140 MMOL/L — SIGNIFICANT CHANGE UP (ref 135–145)
WBC # BLD: 5.4 K/UL — SIGNIFICANT CHANGE UP (ref 3.8–10.5)
WBC # FLD AUTO: 5.4 K/UL — SIGNIFICANT CHANGE UP (ref 3.8–10.5)

## 2017-11-30 PROCEDURE — 99232 SBSQ HOSP IP/OBS MODERATE 35: CPT

## 2017-11-30 RX ORDER — ELECTROLYTE SOLUTION,INJ
1 VIAL (ML) INTRAVENOUS
Qty: 0 | Refills: 0 | Status: DISCONTINUED | OUTPATIENT
Start: 2017-11-30 | End: 2017-11-30

## 2017-11-30 RX ADMIN — Medication 325 MILLIGRAM(S): at 11:12

## 2017-11-30 RX ADMIN — Medication 325 MILLIGRAM(S): at 18:27

## 2017-11-30 RX ADMIN — PANTOPRAZOLE SODIUM 40 MILLIGRAM(S): 20 TABLET, DELAYED RELEASE ORAL at 05:53

## 2017-11-30 RX ADMIN — NYSTATIN CREAM 1 APPLICATION(S): 100000 CREAM TOPICAL at 05:57

## 2017-11-30 RX ADMIN — ONDANSETRON 4 MILLIGRAM(S): 8 TABLET, FILM COATED ORAL at 17:39

## 2017-11-30 RX ADMIN — Medication 325 MILLIGRAM(S): at 21:53

## 2017-11-30 RX ADMIN — Medication 5 MILLIGRAM(S): at 21:06

## 2017-11-30 RX ADMIN — LOSARTAN POTASSIUM 50 MILLIGRAM(S): 100 TABLET, FILM COATED ORAL at 05:53

## 2017-11-30 RX ADMIN — Medication 10 MILLIGRAM(S): at 17:38

## 2017-11-30 RX ADMIN — ONDANSETRON 4 MILLIGRAM(S): 8 TABLET, FILM COATED ORAL at 10:12

## 2017-11-30 RX ADMIN — DAPTOMYCIN 132 MILLIGRAM(S): 500 INJECTION, POWDER, LYOPHILIZED, FOR SOLUTION INTRAVENOUS at 17:39

## 2017-11-30 RX ADMIN — ESCITALOPRAM OXALATE 20 MILLIGRAM(S): 10 TABLET, FILM COATED ORAL at 21:32

## 2017-11-30 RX ADMIN — NYSTATIN CREAM 1 APPLICATION(S): 100000 CREAM TOPICAL at 17:40

## 2017-11-30 RX ADMIN — LIDOCAINE 1 PATCH: 4 CREAM TOPICAL at 00:00

## 2017-11-30 RX ADMIN — Medication 50 MILLIGRAM(S): at 21:06

## 2017-11-30 RX ADMIN — GABAPENTIN 100 MILLIGRAM(S): 400 CAPSULE ORAL at 05:53

## 2017-11-30 RX ADMIN — Medication 10 MILLIGRAM(S): at 05:54

## 2017-11-30 RX ADMIN — Medication 1 APPLICATION(S): at 05:53

## 2017-11-30 RX ADMIN — Medication 1 EACH: at 17:38

## 2017-11-30 RX ADMIN — ONDANSETRON 4 MILLIGRAM(S): 8 TABLET, FILM COATED ORAL at 01:59

## 2017-11-30 RX ADMIN — ENOXAPARIN SODIUM 30 MILLIGRAM(S): 100 INJECTION SUBCUTANEOUS at 17:38

## 2017-11-30 RX ADMIN — OCTREOTIDE ACETATE 450 MICROGRAM(S): 200 INJECTION, SOLUTION INTRAVENOUS; SUBCUTANEOUS at 15:07

## 2017-11-30 RX ADMIN — Medication 325 MILLIGRAM(S): at 21:06

## 2017-11-30 RX ADMIN — GABAPENTIN 100 MILLIGRAM(S): 400 CAPSULE ORAL at 17:38

## 2017-11-30 RX ADMIN — Medication 325 MILLIGRAM(S): at 01:58

## 2017-11-30 RX ADMIN — Medication 325 MILLIGRAM(S): at 10:12

## 2017-11-30 RX ADMIN — Medication 325 MILLIGRAM(S): at 17:38

## 2017-11-30 RX ADMIN — LIDOCAINE 1 PATCH: 4 CREAM TOPICAL at 15:06

## 2017-11-30 RX ADMIN — Medication 100 UNIT(S): at 15:06

## 2017-11-30 RX ADMIN — ENOXAPARIN SODIUM 30 MILLIGRAM(S): 100 INJECTION SUBCUTANEOUS at 06:01

## 2017-11-30 RX ADMIN — Medication 325 MILLIGRAM(S): at 02:15

## 2017-11-30 NOTE — CHART NOTE - NSCHARTNOTEFT_GEN_A_CORE
Admitting Diagnosis:   Patient is a 57y old  Female who presents with a chief complaint of enterocutaneous fistula (24 Jul 2017 14:15)      PAST MEDICAL & SURGICAL HISTORY:  Reflux  Obesity  DVT (deep venous thrombosis): 2013 neck  Diverticulitis  Hypertension  Elective surgery: abodominal wall surgery  dec 2016  Elective surgery: NOV 2016 gastric bypass revision  Gastric bypass status for obesity: gastric sleeve, 6/2012  S/P breast biopsy: 2011, left  S/P colon resection: 2011  S/P knee surgery: repair 2009, 2011  right; left  Umbilical hernia: 2000  S/P appendectomy: 1975  S/P tonsillectomy: 1967    Current Nutrition Order:  NPO with Ice Chips/Sips of Water   >> TPN via central venous line:  1752 TV (73ml/hr); 355g D, 140g AA (1767kcal non lipid calories) and  50g 20% lipids; total kcal w/ lipids: (2267 kcal, 2.7g/kg IBW pro, GIR 3.30)  >> Per MD note, pt is allowed to have soup broth x1/day. Will verify MD Patient Care order daily prior to sending up soup at lunch.     PO Intake: Good (%) [   ]  Fair (50-75%) [   ] Poor (<25%) [ x  ]  1 Soup Broth /day     GI Issues: No reported GI distress at this time.    Pain: No C/o pain.    Skin Integrity: sacrum un-stageable PU; sacral abrasion; abd wound--> NGtube placed w/ sponge through pouch into fistula for suction      Labs:   11-30    140  |  101  |  38<H>  ----------------------------<  119<H>  4.2   |  26  |  0.56    Ca    9.7      30 Nov 2017 07:37  Phos  3.5     11-30  Mg     2.0     11-30      CAPILLARY BLOOD GLUCOSE      POCT Blood Glucose.: 129 mg/dL (30 Nov 2017 12:13)  POCT Blood Glucose.: 115 mg/dL (30 Nov 2017 05:56)  POCT Blood Glucose.: 129 mg/dL (30 Nov 2017 00:46)  POCT Blood Glucose.: 107 mg/dL (29 Nov 2017 17:33)      Medications:  MEDICATIONS  (STANDING):  bismuth subsalicylate Liquid 30 milliLiter(s) Oral daily  calcitriol Injectable 1 MICROGram(s) IV Push <User Schedule>  collagenase Ointment 1 Application(s) Topical daily  cyanocobalamin Injectable 1000 MICROGram(s) SubCutaneous every 7 days  Dakins Solution - 1/2 Strength 1 Application(s) Topical daily  DAPTOmycin IVPB 800 milliGRAM(s) IV Intermittent every 24 hours  dextrose 5%. 1000 milliLiter(s) (50 mL/Hr) IV Continuous <Continuous>  dextrose 50% Injectable 25 Gram(s) IV Push once  dextrose 50% Injectable 12.5 Gram(s) IV Push once  diazepam    Tablet 5 milliGRAM(s) Oral at bedtime  diphenhydrAMINE   Injectable 50 milliGRAM(s) IV Push at bedtime  enoxaparin Injectable 30 milliGRAM(s) SubCutaneous two times a day  escitalopram 20 milliGRAM(s) Oral daily  gabapentin 100 milliGRAM(s) Oral two times a day  heparin  flush 100 Units/mL Injectable 100 Unit(s) IV Push every other day  insulin lispro (HumaLOG) corrective regimen sliding scale   SubCutaneous every 6 hours  lidocaine   Patch 1 Patch Transdermal daily  losartan 50 milliGRAM(s) Oral daily  nystatin Powder 1 Application(s) Topical two times a day  octreotide  Injectable 450 MICROGram(s) IV Push daily  oxybutynin 10 milliGRAM(s) Oral two times a day  pantoprazole  Injectable 40 milliGRAM(s) IV Push daily  Parenteral Nutrition - Adult 1 Each (73 mL/Hr) TPN Continuous <Continuous>  Parenteral Nutrition - Adult 1 Each (73 mL/Hr) TPN Continuous <Continuous>  sodium chloride 0.9% lock flush 20 milliLiter(s) IV Push once    MEDICATIONS  (PRN):  acetaminophen   Tablet. 325 milliGRAM(s) Oral every 4 hours PRN Moderate Pain (4 - 6)  ondansetron Injectable 4 milliGRAM(s) IV Push every 6 hours PRN Nausea and/or Vomiting  sodium chloride 0.9% lock flush 10 milliLiter(s) IV Push every 1 hour PRN After each medication administration  sodium chloride 0.9% lock flush 10 milliLiter(s) IV Push every 12 hours PRN Lumen of catheter NOT used    Weight:  Bed Scale:  162.0lb (11/30)  161.5lb (11/13)  160.5lb (11/8)   Standing Scale:  167lb (11/13)  164lb (10/17)  219lb (8/1)    Weight Change:   03/2016: 89kg  02/2017: 74kg   07/2017: 76kg  11/2017: 76kg   Wt appears to be stable over last 4 months. Please continue to take wts for trending.      Estimated energy needs:   Estimated energy needs using 52 kg IBW:  increased needs per wound and pressure ulcer healing. needs calculated based on TPN as primary route of nutrition.  30-35 kcal/kg (5012-6699 kcal).   1.8-2 g/kg ( g protein).  30-35 mL/kg (8430-7570 mL fluid).     Subjective:   57 F s/p  SBR, fistula resection, esophagojejunostomy revision w/ post-op course complicated by RTOR for distal anastomosis breakdown, ATN, acute respiratory failure, & septic shock, MRSA bacteremia which resolved. Currently for wound care management  Pt remains NPO except ice chips and sips of water. TPN bag infusing at 73ml/hr providing 355g Dex, 140g AA, 50g 20% lipids (2267 kcal, 2.7g/kg IBW pro, GIR 3.30).   Per team pt is allowed to have soup broth x1/day. Will verify MD patient care order daily prior to sending up soup at lunch. Pt states she is tolerating soup well -no GI distress.  Will follow po intake and amount of calories received from po when feasible. Wt has been stable; please update wt weekly.     Previous Nutrition Diagnosis: Increased nutrient needs RT increased demand for nutrients AEB wound and pressure ulcer healing   Active [  X]  Resolved [   ]    If resolved, new PES:     Goal: Pt to meet >75% EER via tolerated route    Recommendations:  1) Continue w/ current TPN order as tolerated and assess feasibility of other routes w/wound healing  2) Monitor triglycerides  adjust lipids per MD discretion  3) Please continue to take standing weight for trending purposes      Education: Understands meeting nutrient needs via TPN.    Risk Level: High [  X ] Moderate [   ] Low [   ].

## 2017-11-30 NOTE — PROGRESS NOTE ADULT - SUBJECTIVE AND OBJECTIVE BOX
O/N: BRAYDEN, VSS, dressing leaking, reinforced  11/29: negative blood cx , per ID f/u blood culture     STATUS POST  7/24: SBR resection, fistula resection, revision of esophagogegunostomy drain through esophageal hiatus in R mediastinum  7/29: Ex-lap, SB anastamosis in BP limb breakdown with succus throughout abdomen  8/1: abd washout, drainage of abscess, biologic mesh placement, closure of abdominal wall, vac and retention suture  8/7: abd washout, mesh removal, frozen abd noted, LLQ CHECO replaced with benson drain  8/10: leak noted from previous anastamosis site on L side, mid abdomen malecot drain placed in enterotomy, JPs x 2 placed, necrotic fascia debrided, vicryl mesh sutured to fascia circumferentially, xeroform over mesh then vac dressing placed O/N: BRAYDEN, VSS, dressing leaking, reinforced  11/29: negative blood cx , per ID f/u blood culture     STATUS POST  7/24: SBR resection, fistula resection, revision of esophagogegunostomy drain through esophageal hiatus in R mediastinum  7/29: Ex-lap, SB anastamosis in BP limb breakdown with succus throughout abdomen  8/1: abd washout, drainage of abscess, biologic mesh placement, closure of abdominal wall, vac and retention suture  8/7: abd washout, mesh removal, frozen abd noted, LLQ CHECO replaced with benson drain  8/10: leak noted from previous anastamosis site on L side, mid abdomen malecot drain placed in enterotomy, JPs x 2 placed, necrotic fascia debrided, vicryl mesh sutured to fascia circumferentially, xeroform over mesh then vac dressing placed	        SUBJECTIVE:  Flatus: [] YES [] NO             Bowel Movement: [ ] YES [ ] NO  Pain (0-10):            Pain Control Adequate: [ ] YES [ ] NO  Nausea: [ ] YES [ ] NO            Vomiting: [ ] YES [ ] NO  Diarrhea: [ ] YES [ ] NO         Constipation: [ ] YES [ ] NO     Chest Pain: [ ] YES [ ] NO    SOB:  [ ] YES [ ] NO    DAPTOmycin IVPB 800 milliGRAM(s) IV Intermittent every 24 hours  enoxaparin Injectable 30 milliGRAM(s) SubCutaneous two times a day  heparin  flush 100 Units/mL Injectable 100 Unit(s) IV Push every other day  losartan 50 milliGRAM(s) Oral daily      Vital Signs Last 24 Hrs  T(C): 36.3 (30 Nov 2017 06:00), Max: 37 (29 Nov 2017 17:51)  T(F): 97.3 (30 Nov 2017 06:00), Max: 98.6 (29 Nov 2017 17:51)  HR: 63 (30 Nov 2017 06:00) (63 - 68)  BP: 151/94 (30 Nov 2017 06:00) (134/84 - 155/79)  BP(mean): --  RR: 16 (30 Nov 2017 06:00) (16 - 17)  SpO2: 91% (30 Nov 2017 06:00) (91% - 97%)  I&O's Detail    29 Nov 2017 07:01  -  30 Nov 2017 07:00  --------------------------------------------------------  IN:    TPN (Total Parenteral Nutrition): 876 mL  Total IN: 876 mL    OUT:  Total OUT: 0 mL    Total NET: 876 mL          General: NAD, resting comfortably in bed  C/V: NSR  Pulm: Nonlabored breathing, no respiratory distress  Abd: soft, NT/ND. dressing clean and dry. Wound has healthy granulation tis  Extrem: WWP, no edema, SCDs in place        LABS:                        10.1   5.4   )-----------( 300      ( 30 Nov 2017 07:37 )             33.6     11-30    140  |  101  |  38<H>  ----------------------------<  119<H>  4.2   |  26  |  0.56    Ca    9.7      30 Nov 2017 07:37  Phos  3.5     11-30  Mg     2.0     11-30            RADIOLOGY & ADDITIONAL STUDIES: O/N: BRAYDEN, VSS, dressing leaking, reinforced  11/29: negative blood cx , per ID f/u blood culture     STATUS POST  7/24: SBR resection, fistula resection, revision of esophagogegunostomy drain through esophageal hiatus in R mediastinum  7/29: Ex-lap, SB anastamosis in BP limb breakdown with succus throughout abdomen  8/1: abd washout, drainage of abscess, biologic mesh placement, closure of abdominal wall, vac and retention suture  8/7: abd washout, mesh removal, frozen abd noted, LLQ CHECO replaced with benson drain  8/10: leak noted from previous anastamosis site on L side, mid abdomen malecot drain placed in enterotomy, JPs x 2 placed, necrotic fascia debrided, vicryl mesh sutured to fascia circumferentially, xeroform over mesh then vac dressing placed	        SUBJECTIVE: Patient denies any complaints.  Flatus: [] YES [X] NO             Bowel Movement: [ ] YES [X ] NO  Pain (0-10):            Pain Control Adequate: [X ] YES [ ] NO  Nausea: [ ] YES [X ] NO            Vomiting: [ ] YES [X ] NO  Diarrhea: [ ] YES [X ] NO         Constipation: [ ] YES [ X] NO     Chest Pain: [ ] YES [X ] NO    SOB:  [ ] YES [X ] NO    DAPTOmycin IVPB 800 milliGRAM(s) IV Intermittent every 24 hours  enoxaparin Injectable 30 milliGRAM(s) SubCutaneous two times a day  heparin  flush 100 Units/mL Injectable 100 Unit(s) IV Push every other day  losartan 50 milliGRAM(s) Oral daily      Vital Signs Last 24 Hrs  T(C): 36.3 (30 Nov 2017 06:00), Max: 37 (29 Nov 2017 17:51)  T(F): 97.3 (30 Nov 2017 06:00), Max: 98.6 (29 Nov 2017 17:51)  HR: 63 (30 Nov 2017 06:00) (63 - 68)  BP: 151/94 (30 Nov 2017 06:00) (134/84 - 155/79)  BP(mean): --  RR: 16 (30 Nov 2017 06:00) (16 - 17)  SpO2: 91% (30 Nov 2017 06:00) (91% - 97%)  I&O's Detail    29 Nov 2017 07:01  -  30 Nov 2017 07:00  --------------------------------------------------------  IN:    TPN (Total Parenteral Nutrition): 876 mL  Total IN: 876 mL    OUT:  Total OUT: 0 mL    Total NET: 876 mL          General: NAD, resting comfortably in bed  C/V: NSR  Pulm: Nonlabored breathing, no respiratory distress  Abd: soft, NT/ND. dressing clean and dry. Wound has healthy granulation tis  Extrem: WWP, no edema, SCDs in place        LABS:                        10.1   5.4   )-----------( 300      ( 30 Nov 2017 07:37 )             33.6     11-30    140  |  101  |  38<H>  ----------------------------<  119<H>  4.2   |  26  |  0.56    Ca    9.7      30 Nov 2017 07:37  Phos  3.5     11-30  Mg     2.0     11-30            RADIOLOGY & ADDITIONAL STUDIES:

## 2017-11-30 NOTE — PROGRESS NOTE ADULT - SUBJECTIVE AND OBJECTIVE BOX
INTERVAL HPI/OVERNIGHT EVENTS:    Patient seen and examined.  No complaints today.  On IV daptomycin    CONSTITUTIONAL:  Negative fever or chills, feels well, good appetite  EYES:  Negative  blurry vision or double vision  CARDIOVASCULAR:  Negative for chest pain or palpitations  RESPIRATORY:  Negative for cough, wheezing, or SOB   GASTROINTESTINAL:  Negative for nausea, vomiting, diarrhea, constipation, or abdominal pain  GENITOURINARY:  Negative frequency, urgency or dysuria  NEUROLOGIC:  No headache, confusion, dizziness, lightheadedness      ANTIBIOTICS/RELEVANT:    MEDICATIONS  (STANDING):  bismuth subsalicylate Liquid 30 milliLiter(s) Oral daily  calcitriol Injectable 1 MICROGram(s) IV Push <User Schedule>  collagenase Ointment 1 Application(s) Topical daily  cyanocobalamin Injectable 1000 MICROGram(s) SubCutaneous every 7 days  Dakins Solution - 1/2 Strength 1 Application(s) Topical daily  DAPTOmycin IVPB 800 milliGRAM(s) IV Intermittent every 24 hours  dextrose 5%. 1000 milliLiter(s) (50 mL/Hr) IV Continuous <Continuous>  dextrose 50% Injectable 25 Gram(s) IV Push once  dextrose 50% Injectable 12.5 Gram(s) IV Push once  diazepam    Tablet 5 milliGRAM(s) Oral at bedtime  diphenhydrAMINE   Injectable 50 milliGRAM(s) IV Push at bedtime  enoxaparin Injectable 30 milliGRAM(s) SubCutaneous two times a day  escitalopram 20 milliGRAM(s) Oral daily  gabapentin 100 milliGRAM(s) Oral two times a day  heparin  flush 100 Units/mL Injectable 100 Unit(s) IV Push every other day  insulin lispro (HumaLOG) corrective regimen sliding scale   SubCutaneous every 6 hours  lidocaine   Patch 1 Patch Transdermal daily  losartan 50 milliGRAM(s) Oral daily  nystatin Powder 1 Application(s) Topical two times a day  octreotide  Injectable 450 MICROGram(s) IV Push daily  oxybutynin 10 milliGRAM(s) Oral two times a day  pantoprazole  Injectable 40 milliGRAM(s) IV Push daily  Parenteral Nutrition - Adult 1 Each (73 mL/Hr) TPN Continuous <Continuous>  Parenteral Nutrition - Adult 1 Each (73 mL/Hr) TPN Continuous <Continuous>  sodium chloride 0.9% lock flush 20 milliLiter(s) IV Push once    MEDICATIONS  (PRN):  acetaminophen   Tablet. 325 milliGRAM(s) Oral every 4 hours PRN Moderate Pain (4 - 6)  ondansetron Injectable 4 milliGRAM(s) IV Push every 6 hours PRN Nausea and/or Vomiting  sodium chloride 0.9% lock flush 10 milliLiter(s) IV Push every 1 hour PRN After each medication administration  sodium chloride 0.9% lock flush 10 milliLiter(s) IV Push every 12 hours PRN Lumen of catheter NOT used        Vital Signs Last 24 Hrs  T(C): 36.2 (30 Nov 2017 09:45), Max: 37 (29 Nov 2017 17:51)  T(F): 97.1 (30 Nov 2017 09:45), Max: 98.6 (29 Nov 2017 17:51)  HR: 72 (30 Nov 2017 09:45) (63 - 72)  BP: 122/86 (30 Nov 2017 09:45) (122/86 - 155/79)  BP(mean): --  RR: 16 (30 Nov 2017 09:45) (16 - 17)  SpO2: 96% (30 Nov 2017 09:45) (91% - 97%)    PHYSICAL EXAM:  Constitutional: non-toxic, no acute distress  Eyes: no icterus  Ear/Nose/Throat: no oral lesion, no sinus tenderness on percussion	  Neck:  supple, left IJ  Respiratory: CTA eileen  Cardiovascular: S1S2 RRR, no murmurs  Gastrointestinal:soft, + open wound  Extremities:  + edema  Vascular: DP Pulse:	right normal; left normal      LABS:                        10.1   5.4   )-----------( 300      ( 30 Nov 2017 07:37 )             33.6     11-30    140  |  101  |  38<H>  ----------------------------<  119<H>  4.2   |  26  |  0.56    Ca    9.7      30 Nov 2017 07:37  Phos  3.5     11-30  Mg     2.0     11-30            MICROBIOLOGY:  Culture - Blood (11.28.17 @ 08:18)    Specimen Source: .Blood Blood    Culture Results:   No growth at 2 days.    Culture - Blood (11.20.17 @ 20:45)    Gram Stain:   Anaerobic Bottle: Gram Positive Cocci in Clusters  Result called to and read back by_ Ms. BRAD Mccormack RN  11/21/2017  20:35:55    -  Penicillin: R 8    -  Clindamycin: R <=0.5    -  Daptomycin: S <=0.5    -  Vancomycin: S 1    -  Cefazolin: R 16    -  Erythromycin: R >4    -  Linezolid: S 2    -  Oxacillin: R >2    -  RIF- Rifampin: S <=1    -  Tetra/Doxy: S <=4    -  Trimethoprim/Sulfamethoxazole: S <=0.5/9.5    Specimen Source: .Blood Blood    Organism: Methicillin resistant Staphylococcus aureus    Organism: Methicillin resistant Staphylococcus aureus    Culture Results:   Growth in anaerobic bottle: Methicillin resistant Staphylococcus aureus  Floor previously notified.  Aerobic Bottle: No growth    Organism Identification: Methicillin resistant Staphylococcus aureus  Methicillin resistant Staphylococcus aureus    Method Type: ALISHA    Method Type: ETEST        RADIOLOGY & ADDITIONAL STUDIES:

## 2017-11-30 NOTE — PROGRESS NOTE ADULT - ASSESSMENT
IMPRESSION:  Recurrent MRSA bacteremia. Latest cultures show no growth to date    Recommend:  1.  Continue Daptomycin 800 mg IV daily.    2.  Check CPK level with next blood draw  3. Would check gallium scan to eval for occult source of persistent bacteremia

## 2017-12-01 LAB
ANION GAP SERPL CALC-SCNC: 12 MMOL/L — SIGNIFICANT CHANGE UP (ref 5–17)
BUN SERPL-MCNC: 36 MG/DL — HIGH (ref 7–23)
CALCIUM SERPL-MCNC: 10.1 MG/DL — SIGNIFICANT CHANGE UP (ref 8.4–10.5)
CHLORIDE SERPL-SCNC: 99 MMOL/L — SIGNIFICANT CHANGE UP (ref 96–108)
CK SERPL-CCNC: 1117 U/L — HIGH (ref 25–170)
CO2 SERPL-SCNC: 29 MMOL/L — SIGNIFICANT CHANGE UP (ref 22–31)
CREAT SERPL-MCNC: 0.61 MG/DL — SIGNIFICANT CHANGE UP (ref 0.5–1.3)
GLUCOSE BLDC GLUCOMTR-MCNC: 108 MG/DL — HIGH (ref 70–99)
GLUCOSE BLDC GLUCOMTR-MCNC: 126 MG/DL — HIGH (ref 70–99)
GLUCOSE BLDC GLUCOMTR-MCNC: 130 MG/DL — HIGH (ref 70–99)
GLUCOSE SERPL-MCNC: 109 MG/DL — HIGH (ref 70–99)
MAGNESIUM SERPL-MCNC: 2.2 MG/DL — SIGNIFICANT CHANGE UP (ref 1.6–2.6)
PHOSPHATE SERPL-MCNC: 4 MG/DL — SIGNIFICANT CHANGE UP (ref 2.5–4.5)
POTASSIUM SERPL-MCNC: 4.3 MMOL/L — SIGNIFICANT CHANGE UP (ref 3.5–5.3)
POTASSIUM SERPL-SCNC: 4.3 MMOL/L — SIGNIFICANT CHANGE UP (ref 3.5–5.3)
SODIUM SERPL-SCNC: 140 MMOL/L — SIGNIFICANT CHANGE UP (ref 135–145)

## 2017-12-01 PROCEDURE — 99233 SBSQ HOSP IP/OBS HIGH 50: CPT | Mod: GC

## 2017-12-01 RX ORDER — ACETAMINOPHEN 500 MG
1000 TABLET ORAL ONCE
Qty: 0 | Refills: 0 | Status: COMPLETED | OUTPATIENT
Start: 2017-12-01 | End: 2017-12-01

## 2017-12-01 RX ORDER — VANCOMYCIN HCL 1 G
750 VIAL (EA) INTRAVENOUS EVERY 12 HOURS
Qty: 0 | Refills: 0 | Status: DISCONTINUED | OUTPATIENT
Start: 2017-12-01 | End: 2017-12-03

## 2017-12-01 RX ORDER — ELECTROLYTE SOLUTION,INJ
1 VIAL (ML) INTRAVENOUS
Qty: 0 | Refills: 0 | Status: DISCONTINUED | OUTPATIENT
Start: 2017-12-01 | End: 2017-12-01

## 2017-12-01 RX ORDER — DIAZEPAM 5 MG
5 TABLET ORAL AT BEDTIME
Qty: 0 | Refills: 0 | Status: DISCONTINUED | OUTPATIENT
Start: 2017-12-02 | End: 2017-12-08

## 2017-12-01 RX ORDER — I.V. FAT EMULSION 20 G/100ML
0.51 EMULSION INTRAVENOUS
Qty: 50 | Refills: 0 | Status: DISCONTINUED | OUTPATIENT
Start: 2017-12-01 | End: 2017-12-01

## 2017-12-01 RX ADMIN — ENOXAPARIN SODIUM 30 MILLIGRAM(S): 100 INJECTION SUBCUTANEOUS at 18:54

## 2017-12-01 RX ADMIN — LOSARTAN POTASSIUM 50 MILLIGRAM(S): 100 TABLET, FILM COATED ORAL at 06:48

## 2017-12-01 RX ADMIN — ONDANSETRON 4 MILLIGRAM(S): 8 TABLET, FILM COATED ORAL at 21:28

## 2017-12-01 RX ADMIN — Medication 1 APPLICATION(S): at 06:51

## 2017-12-01 RX ADMIN — ENOXAPARIN SODIUM 30 MILLIGRAM(S): 100 INJECTION SUBCUTANEOUS at 06:48

## 2017-12-01 RX ADMIN — Medication 1 APPLICATION(S): at 12:30

## 2017-12-01 RX ADMIN — GABAPENTIN 100 MILLIGRAM(S): 400 CAPSULE ORAL at 18:55

## 2017-12-01 RX ADMIN — Medication 150 MILLIGRAM(S): at 18:47

## 2017-12-01 RX ADMIN — CALCITRIOL 1 MICROGRAM(S): 0.5 CAPSULE ORAL at 12:31

## 2017-12-01 RX ADMIN — Medication 325 MILLIGRAM(S): at 02:48

## 2017-12-01 RX ADMIN — Medication 10 MILLIGRAM(S): at 18:46

## 2017-12-01 RX ADMIN — Medication 5 MILLIGRAM(S): at 21:28

## 2017-12-01 RX ADMIN — Medication 325 MILLIGRAM(S): at 18:29

## 2017-12-01 RX ADMIN — Medication 325 MILLIGRAM(S): at 07:20

## 2017-12-01 RX ADMIN — ONDANSETRON 4 MILLIGRAM(S): 8 TABLET, FILM COATED ORAL at 02:48

## 2017-12-01 RX ADMIN — GABAPENTIN 100 MILLIGRAM(S): 400 CAPSULE ORAL at 06:48

## 2017-12-01 RX ADMIN — Medication 1000 MILLIGRAM(S): at 23:55

## 2017-12-01 RX ADMIN — NYSTATIN CREAM 1 APPLICATION(S): 100000 CREAM TOPICAL at 06:51

## 2017-12-01 RX ADMIN — Medication 325 MILLIGRAM(S): at 16:57

## 2017-12-01 RX ADMIN — ESCITALOPRAM OXALATE 20 MILLIGRAM(S): 10 TABLET, FILM COATED ORAL at 21:28

## 2017-12-01 RX ADMIN — NYSTATIN CREAM 1 APPLICATION(S): 100000 CREAM TOPICAL at 18:55

## 2017-12-01 RX ADMIN — PANTOPRAZOLE SODIUM 40 MILLIGRAM(S): 20 TABLET, DELAYED RELEASE ORAL at 06:48

## 2017-12-01 RX ADMIN — OCTREOTIDE ACETATE 400 MICROGRAM(S): 200 INJECTION, SOLUTION INTRAVENOUS; SUBCUTANEOUS at 12:32

## 2017-12-01 RX ADMIN — Medication 325 MILLIGRAM(S): at 06:48

## 2017-12-01 RX ADMIN — Medication 325 MILLIGRAM(S): at 03:10

## 2017-12-01 RX ADMIN — Medication 400 MILLIGRAM(S): at 22:37

## 2017-12-01 RX ADMIN — LIDOCAINE 1 PATCH: 4 CREAM TOPICAL at 04:39

## 2017-12-01 RX ADMIN — Medication 50 MILLIGRAM(S): at 21:27

## 2017-12-01 RX ADMIN — Medication 10 MILLIGRAM(S): at 06:48

## 2017-12-01 RX ADMIN — I.V. FAT EMULSION 20.83 GM/KG/DAY: 20 EMULSION INTRAVENOUS at 22:37

## 2017-12-01 RX ADMIN — LIDOCAINE 1 PATCH: 4 CREAM TOPICAL at 12:27

## 2017-12-01 NOTE — PROGRESS NOTE ADULT - ASSESSMENT
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

## 2017-12-01 NOTE — PROGRESS NOTE ADULT - ASSESSMENT
57 F s/p  SBR, fistula resection, esophagojejunostomy revision w/ post-op course complicated by RTOR for distal anastomosis breakdown, ATN, acute respiratory failure, & septic shock, MRSA bacteremia which resolved. Currently for wound care management.   NPO with sips and chips /TPN/IVF   Pain/nausea control - only benadryl at midnight and dilaudid 0.25mg during PT session   ISS/OOB with PT daily  Nystatin powder, vancomycin 750 Q12 (10/11-11/8) ( Daptomycin 11/22-)  SCDs, lovenox, OOBA  Lines :  L left subclavian line (10/4-- )  AM labs, weekly albumin, pre-albumin and triglyceride (every friday)  Wet to dry dressing in place  NGT in fistula 57 F s/p  SBR, fistula resection, esophagojejunostomy revision w/ post-op course complicated by RTOR for distal anastomosis breakdown, ATN, acute respiratory failure, & septic shock, MRSA bacteremia which resolved. Currently for wound care management.     - New subclavian line needed;   - CK elevated to <1000; DC dapto, restart vanco    NPO with sips and chips /TPN/IVF   Pain/nausea control - only benadryl at midnight and dilaudid 0.25mg during PT session   ISS/OOB with PT daily  Nystatin powder, vancomycin 750 Q12 (restart 12/1 - ___, 10/11-11/8) ( Daptomycin 11/22-12/1 due to CK >1000)  SCDs, lovenox, OOBA  Lines :  L left subclavian line (10/4-- )  AM labs, weekly albumin, pre-albumin and triglyceride (every friday)  Wet to dry dressing in place  NGT in fistula

## 2017-12-01 NOTE — PROGRESS NOTE ADULT - SUBJECTIVE AND OBJECTIVE BOX
Surgery requested an elective new central venous catheter to replace the left subclavian venous catheter. On u/s exam, the right IJ and subclavian veins do not appear to be suitable and she states there  was a clot on the right side. the left IJ appears normal. The procedure was explained for consent.

## 2017-12-01 NOTE — PROGRESS NOTE ADULT - SUBJECTIVE AND OBJECTIVE BOX
INTERVAL HPI/OVERNIGHT EVENTS: BRAYDEN    REVIEW OF SYSTEMS:    CONSTITUTIONAL: No weakness, fevers or chills  EYES/ENT: No visual changes;  No vertigo or throat pain   NECK: No pain or stiffness  RESPIRATORY: No cough, wheezing, hemoptysis; No shortness of breath  CARDIOVASCULAR: No chest pain or palpitations  GASTROINTESTINAL: Fistula / NPO  GENITOURINARY: No dysuria, frequency or hematuria  NEUROLOGICAL: No numbness or weakness  SKIN: open wound/fistula  MUSC/SKEL: Proximal muscular pain b/l shoulders  All other review of systems is negative unless indicated above.    ANTIBIOTICS/RELEVANT:    MEDICATIONS  (STANDING):  bismuth subsalicylate Liquid 30 milliLiter(s) Oral daily  calcitriol Injectable 1 MICROGram(s) IV Push <User Schedule>  collagenase Ointment 1 Application(s) Topical daily  cyanocobalamin Injectable 1000 MICROGram(s) SubCutaneous every 7 days  Dakins Solution - 1/2 Strength 1 Application(s) Topical daily  DAPTOmycin IVPB 800 milliGRAM(s) IV Intermittent every 24 hours  dextrose 5%. 1000 milliLiter(s) (50 mL/Hr) IV Continuous <Continuous>  dextrose 50% Injectable 25 Gram(s) IV Push once  dextrose 50% Injectable 12.5 Gram(s) IV Push once  diazepam    Tablet 5 milliGRAM(s) Oral at bedtime  diphenhydrAMINE   Injectable 50 milliGRAM(s) IV Push at bedtime  enoxaparin Injectable 30 milliGRAM(s) SubCutaneous two times a day  escitalopram 20 milliGRAM(s) Oral daily  gabapentin 100 milliGRAM(s) Oral two times a day  heparin  flush 100 Units/mL Injectable 100 Unit(s) IV Push every other day  insulin lispro (HumaLOG) corrective regimen sliding scale   SubCutaneous every 6 hours  lidocaine   Patch 1 Patch Transdermal daily  losartan 50 milliGRAM(s) Oral daily  nystatin Powder 1 Application(s) Topical two times a day  octreotide  Injectable 450 MICROGram(s) IV Push daily  oxybutynin 10 milliGRAM(s) Oral two times a day  pantoprazole  Injectable 40 milliGRAM(s) IV Push daily  Parenteral Nutrition - Adult 1 Each (73 mL/Hr) TPN Continuous <Continuous>  sodium chloride 0.9% lock flush 20 milliLiter(s) IV Push once    MEDICATIONS  (PRN):  acetaminophen   Tablet. 325 milliGRAM(s) Oral every 4 hours PRN Moderate Pain (4 - 6)  ondansetron Injectable 4 milliGRAM(s) IV Push every 6 hours PRN Nausea and/or Vomiting  sodium chloride 0.9% lock flush 10 milliLiter(s) IV Push every 1 hour PRN After each medication administration  sodium chloride 0.9% lock flush 10 milliLiter(s) IV Push every 12 hours PRN Lumen of catheter NOT used    VITALS  Vital Signs Last 24 Hrs  T(C): 37.1 (01 Dec 2017 09:12), Max: 37.2 (30 Nov 2017 16:20)  T(F): 98.7 (01 Dec 2017 09:12), Max: 99 (30 Nov 2017 16:20)  HR: 77 (01 Dec 2017 09:12) (63 - 78)  BP: 127/87 (01 Dec 2017 09:12) (127/87 - 143/81)  BP(mean): --  RR: 17 (01 Dec 2017 09:12) (16 - 18)  SpO2: 97% (01 Dec 2017 09:12) (93% - 97%)    I&O's Summary    30 Nov 2017 07:01  -  01 Dec 2017 07:00  --------------------------------------------------------  IN: 1872 mL / OUT: 1210 mL / NET: 662 mL    01 Dec 2017 07:01  -  01 Dec 2017 10:47  --------------------------------------------------------  IN: 146 mL / OUT: 0 mL / NET: 146 mL      CAPILLARY BLOOD GLUCOSE      POCT Blood Glucose.: 130 mg/dL (01 Dec 2017 05:35)  POCT Blood Glucose.: 126 mg/dL (01 Dec 2017 00:11)  POCT Blood Glucose.: 115 mg/dL (30 Nov 2017 20:33)  POCT Blood Glucose.: 106 mg/dL (30 Nov 2017 16:44)  POCT Blood Glucose.: 129 mg/dL (30 Nov 2017 12:13)      PHYSICAL EXAM  General: A&Ox3; NAD  Head: NC/AT; PERRL; EOMI   Neck: Supple; no JVD; left subclavian TLC in place w/ clean dressing  Respiratory: CTA B/L; no wheezes/crackles   Cardiovascular: Regular rhythm/rate; S1/S2  Gastrointestinal: Soft; NTND w/open fistula w/ stool output in ostomy   Extremities: WWP; no edema or cyanosis; radial/pedal pulses palpable  Neurological:  CNII-XII grossly intact; no obvious focal deficits    LABS:                        10.1   5.4   )-----------( 300      ( 30 Nov 2017 07:37 )             33.6     12-01    140  |  99  |  36<H>  ----------------------------<  109<H>  4.3   |  29  |  0.61    Ca    10.1      01 Dec 2017 07:40  Phos  3.5     11-30  Mg     2.0     11-30    Creatine Kinase, Serum: 1117 U/L (12.01.17 @ 07:40)    MICROBIOLOGY:  MRSA bacteremia w/ surv cultures negative

## 2017-12-01 NOTE — PROGRESS NOTE ADULT - SUBJECTIVE AND OBJECTIVE BOX
andrzej remains stable and looks well  wound is dian   her major concern is what to eat and I have explained that this will only increase output which will inhibit wound healing  will change central line  continue current wound management as is dian  goal is to get to only fistulas and then plan definitive surgery

## 2017-12-01 NOTE — PROGRESS NOTE ADULT - PROBLEM SELECTOR PLAN 1
MRSA bacteremia, w/ negative surv cultures x2 days.  Unknown source of bacteremia, ELODIA negative.  Patient currently on daptomycin but now w/ proximal myalgia and elevated CK.  - Rec d/c dapto and start vancomycin 750 q12hr w/ vanc trough prior to every 4th dose w/ goal 15-20.  - Please repeat CK daily to make sure its downtrending since stopping daptomycin  - Surv cultures negative at 48hours -- OK to replace central line  - Recommend nuclear gallium scan to evaluate for source of infection  - ID will continue to follow

## 2017-12-01 NOTE — PROGRESS NOTE ADULT - SUBJECTIVE AND OBJECTIVE BOX
O/N: BRAYDEN, VSS  11/30: BRAYDEN , VSS     STATUS POST:  7/24: SBR resection, fistula resection, revision of esophagogegunostomy drain through esophageal hiatus in R mediastinum  7/29: Ex-lap, SB anastamosis in BP limb breakdown with succus throughout abdomen  8/1: abd washout, drainage of abscess, biologic mesh placement, closure of abdominal wall, vac and retention suture  8/7: abd washout, mesh removal, frozen abd noted, LLQ CHECO replaced with benson drain  8/10: leak noted from previous anastamosis site on L side, mid abdomen malecot drain placed in enterotomy, JPs x 2 placed, necrotic fascia debrided, vicryl mesh sutured to fascia circumferentially, xeroform over mesh then vac dressing placed O/N: BRAYDEN, VSS  11/30: BRAYDEN , VSS     STATUS POST:  7/24: SBR resection, fistula resection, revision of esophagogegunostomy drain through esophageal hiatus in R mediastinum  7/29: Ex-lap, SB anastamosis in BP limb breakdown with succus throughout abdomen  8/1: abd washout, drainage of abscess, biologic mesh placement, closure of abdominal wall, vac and retention suture  8/7: abd washout, mesh removal, frozen abd noted, LLQ CHECO replaced with benson drain  8/10: leak noted from previous anastamosis site on L side, mid abdomen malecot drain placed in enterotomy, JPs x 2 placed, necrotic fascia debrided, vicryl mesh sutured to fascia circumferentially, xeroform over mesh then vac dressing placed	    SUBJECTIVE: Patient seen and examined bedside by surgery team. Pain well-controlled. Denies nausea, vomiting, chest pain, shortness of breath, fevers, or chills.          DAPTOmycin IVPB 800 milliGRAM(s) IV Intermittent every 24 hours  enoxaparin Injectable 30 milliGRAM(s) SubCutaneous two times a day  heparin  flush 100 Units/mL Injectable 100 Unit(s) IV Push every other day  losartan 50 milliGRAM(s) Oral daily      Vital Signs Last 24 Hrs  T(C): 37.1 (01 Dec 2017 09:12), Max: 37.2 (30 Nov 2017 16:20)  T(F): 98.7 (01 Dec 2017 09:12), Max: 99 (30 Nov 2017 16:20)  HR: 77 (01 Dec 2017 09:12) (63 - 78)  BP: 127/87 (01 Dec 2017 09:12) (127/87 - 143/81)  BP(mean): --  RR: 17 (01 Dec 2017 09:12) (16 - 18)  SpO2: 97% (01 Dec 2017 09:12) (93% - 97%)  I&O's Detail    30 Nov 2017 07:01  -  01 Dec 2017 07:00  --------------------------------------------------------  IN:    Fat Emulsion 20%: 120 mL    TPN (Total Parenteral Nutrition): 1752 mL  Total IN: 1872 mL    OUT:    Drain: 600 mL    Drain: 210 mL    Drain: 200 mL    Nasoenteral Tube: 200 mL  Total OUT: 1210 mL    Total NET: 662 mL      01 Dec 2017 07:01  -  01 Dec 2017 11:40  --------------------------------------------------------  IN:    Oral Fluid: 150 mL    TPN (Total Parenteral Nutrition): 365 mL  Total IN: 515 mL    OUT:  Total OUT: 0 mL    Total NET: 515 mL        Physical Exam:  General: NAD, resting comfortably in bed  C/V: NSR  Pulm: Nonlabored breathing, no respiratory distress  Abd: soft, NT/ND. dressing clean and dry. wound has healthy granulation tissue  Extrem: WWP, no edema, SCDs in place        LABS:                        10.1   5.4   )-----------( 300      ( 30 Nov 2017 07:37 )             33.6     12-01    140  |  99  |  36<H>  ----------------------------<  109<H>  4.3   |  29  |  0.61    Ca    10.1      01 Dec 2017 07:40  Phos  3.5     11-30  Mg     2.0     11-30            RADIOLOGY & ADDITIONAL STUDIES:

## 2017-12-01 NOTE — PROGRESS NOTE ADULT - ATTENDING COMMENTS
Agree with above.  Unfortunately patient's CPK is elevated > 1100 and she has myalgias.  This is likely due to daptomycin.    Would recommend stopping daptomycin and restarting vancomycin 750 mg IV q12.  Check trough prior to 4th dose (should be in PM on 12/2).  Would gallium scan to assess for recurrent source of MRSA bacteremia.  It is not attributed to failure of vancomycin

## 2017-12-02 LAB
ANION GAP SERPL CALC-SCNC: 14 MMOL/L — SIGNIFICANT CHANGE UP (ref 5–17)
BUN SERPL-MCNC: 34 MG/DL — HIGH (ref 7–23)
CALCIUM SERPL-MCNC: 9.8 MG/DL — SIGNIFICANT CHANGE UP (ref 8.4–10.5)
CHLORIDE SERPL-SCNC: 100 MMOL/L — SIGNIFICANT CHANGE UP (ref 96–108)
CK SERPL-CCNC: 2356 U/L — HIGH (ref 25–170)
CO2 SERPL-SCNC: 26 MMOL/L — SIGNIFICANT CHANGE UP (ref 22–31)
CREAT SERPL-MCNC: 0.56 MG/DL — SIGNIFICANT CHANGE UP (ref 0.5–1.3)
GLUCOSE BLDC GLUCOMTR-MCNC: 107 MG/DL — HIGH (ref 70–99)
GLUCOSE BLDC GLUCOMTR-MCNC: 115 MG/DL — HIGH (ref 70–99)
GLUCOSE BLDC GLUCOMTR-MCNC: 115 MG/DL — HIGH (ref 70–99)
GLUCOSE BLDC GLUCOMTR-MCNC: 117 MG/DL — HIGH (ref 70–99)
GLUCOSE SERPL-MCNC: 112 MG/DL — HIGH (ref 70–99)
MAGNESIUM SERPL-MCNC: 2.2 MG/DL — SIGNIFICANT CHANGE UP (ref 1.6–2.6)
PHOSPHATE SERPL-MCNC: 3.5 MG/DL — SIGNIFICANT CHANGE UP (ref 2.5–4.5)
POTASSIUM SERPL-MCNC: 4.3 MMOL/L — SIGNIFICANT CHANGE UP (ref 3.5–5.3)
POTASSIUM SERPL-SCNC: 4.3 MMOL/L — SIGNIFICANT CHANGE UP (ref 3.5–5.3)
SODIUM SERPL-SCNC: 140 MMOL/L — SIGNIFICANT CHANGE UP (ref 135–145)

## 2017-12-02 PROCEDURE — 71010: CPT | Mod: 26

## 2017-12-02 RX ORDER — ELECTROLYTE SOLUTION,INJ
1 VIAL (ML) INTRAVENOUS
Qty: 0 | Refills: 0 | Status: DISCONTINUED | OUTPATIENT
Start: 2017-12-02 | End: 2017-12-02

## 2017-12-02 RX ORDER — HYDROMORPHONE HYDROCHLORIDE 2 MG/ML
0.25 INJECTION INTRAMUSCULAR; INTRAVENOUS; SUBCUTANEOUS ONCE
Qty: 0 | Refills: 0 | Status: DISCONTINUED | OUTPATIENT
Start: 2017-12-02 | End: 2017-12-02

## 2017-12-02 RX ADMIN — GABAPENTIN 100 MILLIGRAM(S): 400 CAPSULE ORAL at 07:09

## 2017-12-02 RX ADMIN — GABAPENTIN 100 MILLIGRAM(S): 400 CAPSULE ORAL at 17:58

## 2017-12-02 RX ADMIN — ENOXAPARIN SODIUM 30 MILLIGRAM(S): 100 INJECTION SUBCUTANEOUS at 07:08

## 2017-12-02 RX ADMIN — ONDANSETRON 4 MILLIGRAM(S): 8 TABLET, FILM COATED ORAL at 03:29

## 2017-12-02 RX ADMIN — Medication 150 MILLIGRAM(S): at 07:08

## 2017-12-02 RX ADMIN — NYSTATIN CREAM 1 APPLICATION(S): 100000 CREAM TOPICAL at 17:48

## 2017-12-02 RX ADMIN — Medication 10 MILLIGRAM(S): at 17:58

## 2017-12-02 RX ADMIN — Medication 10 MILLIGRAM(S): at 07:08

## 2017-12-02 RX ADMIN — ONDANSETRON 4 MILLIGRAM(S): 8 TABLET, FILM COATED ORAL at 10:14

## 2017-12-02 RX ADMIN — ESCITALOPRAM OXALATE 20 MILLIGRAM(S): 10 TABLET, FILM COATED ORAL at 21:19

## 2017-12-02 RX ADMIN — HYDROMORPHONE HYDROCHLORIDE 0.25 MILLIGRAM(S): 2 INJECTION INTRAMUSCULAR; INTRAVENOUS; SUBCUTANEOUS at 17:58

## 2017-12-02 RX ADMIN — Medication 50 MILLIGRAM(S): at 21:19

## 2017-12-02 RX ADMIN — ONDANSETRON 4 MILLIGRAM(S): 8 TABLET, FILM COATED ORAL at 19:12

## 2017-12-02 RX ADMIN — ENOXAPARIN SODIUM 30 MILLIGRAM(S): 100 INJECTION SUBCUTANEOUS at 17:57

## 2017-12-02 RX ADMIN — LOSARTAN POTASSIUM 50 MILLIGRAM(S): 100 TABLET, FILM COATED ORAL at 07:08

## 2017-12-02 RX ADMIN — Medication 325 MILLIGRAM(S): at 04:15

## 2017-12-02 RX ADMIN — Medication 325 MILLIGRAM(S): at 03:24

## 2017-12-02 RX ADMIN — Medication 100 UNIT(S): at 11:26

## 2017-12-02 RX ADMIN — Medication 325 MILLIGRAM(S): at 20:00

## 2017-12-02 RX ADMIN — PANTOPRAZOLE SODIUM 40 MILLIGRAM(S): 20 TABLET, DELAYED RELEASE ORAL at 07:08

## 2017-12-02 RX ADMIN — HYDROMORPHONE HYDROCHLORIDE 0.25 MILLIGRAM(S): 2 INJECTION INTRAMUSCULAR; INTRAVENOUS; SUBCUTANEOUS at 18:15

## 2017-12-02 RX ADMIN — LIDOCAINE 1 PATCH: 4 CREAM TOPICAL at 11:26

## 2017-12-02 RX ADMIN — LIDOCAINE 1 PATCH: 4 CREAM TOPICAL at 01:13

## 2017-12-02 RX ADMIN — Medication 150 MILLIGRAM(S): at 17:58

## 2017-12-02 RX ADMIN — Medication 325 MILLIGRAM(S): at 10:45

## 2017-12-02 RX ADMIN — Medication 1 APPLICATION(S): at 07:09

## 2017-12-02 RX ADMIN — Medication 325 MILLIGRAM(S): at 10:15

## 2017-12-02 RX ADMIN — NYSTATIN CREAM 1 APPLICATION(S): 100000 CREAM TOPICAL at 07:09

## 2017-12-02 RX ADMIN — LIDOCAINE 1 PATCH: 4 CREAM TOPICAL at 23:00

## 2017-12-02 RX ADMIN — Medication 5 MILLIGRAM(S): at 21:19

## 2017-12-02 RX ADMIN — OCTREOTIDE ACETATE 450 MICROGRAM(S): 200 INJECTION, SOLUTION INTRAVENOUS; SUBCUTANEOUS at 11:26

## 2017-12-02 RX ADMIN — Medication 1 EACH: at 17:58

## 2017-12-02 RX ADMIN — Medication 325 MILLIGRAM(S): at 19:12

## 2017-12-02 NOTE — PROGRESS NOTE ADULT - ASSESSMENT
57 F s/p  SBR, fistula resection, esophagojejunostomy revision w/ post-op course complicated by RTOR for distal anastomosis breakdown, ATN, acute respiratory failure, & septic shock, MRSA bacteremia which resolved. Currently for wound care management.     CLD/TPN   Pain/nausea control - only benadryl at midnight and dilaudid 0.25mg during PT session   ISS/OOB with PT daily  Nystatin powder, vancomycin 750 Q12 (10/11-11/8) ( Daptomycin 11/22-12/1) Vanco restarted 12/1  SCDs, lovenox (therapeutic), OOBA  Lines :  L left subclavian line (10/4-12/2), L IJ (12/2--)  AM labs, weekly albumin, pre-albumin and triglyceride (every friday).  Ad daily CK level  Wet to dry dressing in place  NGT in fistula

## 2017-12-03 LAB
ANION GAP SERPL CALC-SCNC: 10 MMOL/L — SIGNIFICANT CHANGE UP (ref 5–17)
BUN SERPL-MCNC: 40 MG/DL — HIGH (ref 7–23)
CALCIUM SERPL-MCNC: 10.2 MG/DL — SIGNIFICANT CHANGE UP (ref 8.4–10.5)
CHLORIDE SERPL-SCNC: 104 MMOL/L — SIGNIFICANT CHANGE UP (ref 96–108)
CO2 SERPL-SCNC: 29 MMOL/L — SIGNIFICANT CHANGE UP (ref 22–31)
CREAT SERPL-MCNC: 0.63 MG/DL — SIGNIFICANT CHANGE UP (ref 0.5–1.3)
CULTURE RESULTS: SIGNIFICANT CHANGE UP
GLUCOSE BLDC GLUCOMTR-MCNC: 107 MG/DL — HIGH (ref 70–99)
GLUCOSE BLDC GLUCOMTR-MCNC: 110 MG/DL — HIGH (ref 70–99)
GLUCOSE BLDC GLUCOMTR-MCNC: 124 MG/DL — HIGH (ref 70–99)
GLUCOSE BLDC GLUCOMTR-MCNC: 96 MG/DL — SIGNIFICANT CHANGE UP (ref 70–99)
GLUCOSE SERPL-MCNC: 119 MG/DL — HIGH (ref 70–99)
HCT VFR BLD CALC: 37 % — SIGNIFICANT CHANGE UP (ref 34.5–45)
HGB BLD-MCNC: 11 G/DL — LOW (ref 11.5–15.5)
MAGNESIUM SERPL-MCNC: 2.2 MG/DL — SIGNIFICANT CHANGE UP (ref 1.6–2.6)
MCHC RBC-ENTMCNC: 27 PG — SIGNIFICANT CHANGE UP (ref 27–34)
MCHC RBC-ENTMCNC: 29.7 G/DL — LOW (ref 32–36)
MCV RBC AUTO: 90.7 FL — SIGNIFICANT CHANGE UP (ref 80–100)
PHOSPHATE SERPL-MCNC: 3.8 MG/DL — SIGNIFICANT CHANGE UP (ref 2.5–4.5)
PLATELET # BLD AUTO: 291 K/UL — SIGNIFICANT CHANGE UP (ref 150–400)
POTASSIUM SERPL-MCNC: 4.4 MMOL/L — SIGNIFICANT CHANGE UP (ref 3.5–5.3)
POTASSIUM SERPL-SCNC: 4.4 MMOL/L — SIGNIFICANT CHANGE UP (ref 3.5–5.3)
RBC # BLD: 4.08 M/UL — SIGNIFICANT CHANGE UP (ref 3.8–5.2)
RBC # FLD: 16.3 % — SIGNIFICANT CHANGE UP (ref 10.3–16.9)
SODIUM SERPL-SCNC: 143 MMOL/L — SIGNIFICANT CHANGE UP (ref 135–145)
SPECIMEN SOURCE: SIGNIFICANT CHANGE UP
VANCOMYCIN TROUGH SERPL-MCNC: 11.4 UG/ML — SIGNIFICANT CHANGE UP (ref 10–20)
WBC # BLD: 5.3 K/UL — SIGNIFICANT CHANGE UP (ref 3.8–10.5)
WBC # FLD AUTO: 5.3 K/UL — SIGNIFICANT CHANGE UP (ref 3.8–10.5)

## 2017-12-03 PROCEDURE — 99233 SBSQ HOSP IP/OBS HIGH 50: CPT

## 2017-12-03 PROCEDURE — 78806: CPT | Mod: 26

## 2017-12-03 RX ORDER — VANCOMYCIN HCL 1 G
1000 VIAL (EA) INTRAVENOUS EVERY 12 HOURS
Qty: 0 | Refills: 0 | Status: DISCONTINUED | OUTPATIENT
Start: 2017-12-03 | End: 2017-12-14

## 2017-12-03 RX ORDER — ELECTROLYTE SOLUTION,INJ
1 VIAL (ML) INTRAVENOUS
Qty: 0 | Refills: 0 | Status: DISCONTINUED | OUTPATIENT
Start: 2017-12-03 | End: 2017-12-03

## 2017-12-03 RX ADMIN — GABAPENTIN 100 MILLIGRAM(S): 400 CAPSULE ORAL at 05:23

## 2017-12-03 RX ADMIN — ENOXAPARIN SODIUM 30 MILLIGRAM(S): 100 INJECTION SUBCUTANEOUS at 17:27

## 2017-12-03 RX ADMIN — NYSTATIN CREAM 1 APPLICATION(S): 100000 CREAM TOPICAL at 17:27

## 2017-12-03 RX ADMIN — Medication 325 MILLIGRAM(S): at 12:38

## 2017-12-03 RX ADMIN — ONDANSETRON 4 MILLIGRAM(S): 8 TABLET, FILM COATED ORAL at 12:41

## 2017-12-03 RX ADMIN — Medication 1 APPLICATION(S): at 05:23

## 2017-12-03 RX ADMIN — Medication 250 MILLIGRAM(S): at 17:27

## 2017-12-03 RX ADMIN — Medication 325 MILLIGRAM(S): at 05:23

## 2017-12-03 RX ADMIN — ONDANSETRON 4 MILLIGRAM(S): 8 TABLET, FILM COATED ORAL at 05:23

## 2017-12-03 RX ADMIN — Medication 10 MILLIGRAM(S): at 17:27

## 2017-12-03 RX ADMIN — Medication 50 MILLIGRAM(S): at 21:04

## 2017-12-03 RX ADMIN — Medication 5 MILLIGRAM(S): at 21:03

## 2017-12-03 RX ADMIN — Medication 325 MILLIGRAM(S): at 00:19

## 2017-12-03 RX ADMIN — PANTOPRAZOLE SODIUM 40 MILLIGRAM(S): 20 TABLET, DELAYED RELEASE ORAL at 05:22

## 2017-12-03 RX ADMIN — Medication 325 MILLIGRAM(S): at 01:20

## 2017-12-03 RX ADMIN — ESCITALOPRAM OXALATE 20 MILLIGRAM(S): 10 TABLET, FILM COATED ORAL at 21:03

## 2017-12-03 RX ADMIN — LOSARTAN POTASSIUM 50 MILLIGRAM(S): 100 TABLET, FILM COATED ORAL at 05:23

## 2017-12-03 RX ADMIN — GABAPENTIN 100 MILLIGRAM(S): 400 CAPSULE ORAL at 17:27

## 2017-12-03 RX ADMIN — NYSTATIN CREAM 1 APPLICATION(S): 100000 CREAM TOPICAL at 05:23

## 2017-12-03 RX ADMIN — OCTREOTIDE ACETATE 450 MICROGRAM(S): 200 INJECTION, SOLUTION INTRAVENOUS; SUBCUTANEOUS at 11:15

## 2017-12-03 RX ADMIN — Medication 150 MILLIGRAM(S): at 08:16

## 2017-12-03 RX ADMIN — ENOXAPARIN SODIUM 30 MILLIGRAM(S): 100 INJECTION SUBCUTANEOUS at 05:23

## 2017-12-03 RX ADMIN — Medication 325 MILLIGRAM(S): at 20:55

## 2017-12-03 RX ADMIN — Medication 10 MILLIGRAM(S): at 05:23

## 2017-12-03 RX ADMIN — Medication 325 MILLIGRAM(S): at 21:56

## 2017-12-03 RX ADMIN — ONDANSETRON 4 MILLIGRAM(S): 8 TABLET, FILM COATED ORAL at 20:55

## 2017-12-03 RX ADMIN — Medication 325 MILLIGRAM(S): at 06:38

## 2017-12-03 RX ADMIN — Medication 1 EACH: at 17:27

## 2017-12-03 RX ADMIN — Medication 325 MILLIGRAM(S): at 12:41

## 2017-12-03 NOTE — PROGRESS NOTE ADULT - ASSESSMENT
57 F s/p  SBR, fistula resection, esophagojejunostomy revision w/ post-op course complicated by RTOR for distal anastomosis breakdown, ATN, acute respiratory failure, & septic shock, MRSA bacteremia which resolved. Currently for wound care management.   NPO with sips and chips /TPN/IVF   Pain/nausea control - only benadryl at midnight and dilaudid 0.25mg during PT session   ISS/OOB with PT daily  Nystatin powder, vancomycin 750 Q12 (restart 12/1 - ___, 10/11-11/8) ( Daptomycin 11/22-12/1 due to CK >1000)  SCDs, lovenox, OOBA  Lines :  L left subclavian line (10/4-- )  AM labs, weekly albumin, pre-albumin and triglyceride (every friday)  Wet to dry dressing in place  NGT in fistula   f/u Ga Scan 57 F s/p  SBR, fistula resection, esophagojejunostomy revision w/ post-op course complicated by RTOR for distal anastomosis breakdown, ATN, acute respiratory failure, & septic shock, MRSA bacteremia which resolved. Currently for wound care management.   NPO with sips and chips /TPN/IVF   Pain/nausea control - only benadryl at midnight and dilaudid 0.25mg during PT session   ISS/OOB with PT daily  Nystatin powder, vancomycin 750 Q12 (restart 12/1 - ___, 10/11-11/8) ( Daptomycin 11/22-12/1 due to CK >1000)  SCDs, lovenox, OOBA  Lines :  L left subclavian line (10/4-- )  AM labs, weekly albumin, pre-albumin and triglyceride (every friday)  Wet to dry dressing in place  NGT in fistula   -Vanc trough 11.4 this am  f/u Ga Scan

## 2017-12-03 NOTE — PROGRESS NOTE ADULT - SUBJECTIVE AND OBJECTIVE BOX
O/N: BRAYDEN  12/2: BRAYDEN, gallium scan performed       STATUS POST:  7/24: SBR resection, fistula resection, revision of esophagogegunostomy drain through esophageal hiatus in R mediastinum  7/29: Ex-lap, SB anastamosis in BP limb breakdown with succus throughout abdomen  8/1: abd washout, drainage of abscess, biologic mesh placement, closure of abdominal wall, vac and retention suture  8/7: abd washout, mesh removal, frozen abd noted, LLQ CHECO replaced with benson drain  8/10: leak noted from previous anastamosis site on L side, mid abdomen malecot drain placed in enterotomy, JPs x 2 placed, necrotic fascia debrided, vicryl mesh sutured to fascia Vital Signs Last 24 Hrs  T(C): 36.4 (03 Dec 2017 05:36), Max: 36.9 (02 Dec 2017 17:37)  T(F): 97.6 (03 Dec 2017 05:36), Max: 98.5 (02 Dec 2017 17:37)  HR: 71 (03 Dec 2017 05:36) (71 - 80)  BP: 137/85 (03 Dec 2017 05:36) (134/85 - 148/85)  BP(mean): --  RR: 17 (03 Dec 2017 05:36) (17 - 17)  SpO2: 95% (03 Dec 2017 05:36) (95% - 96%)      I&O's Detail    02 Dec 2017 07:01  -  03 Dec 2017 07:00  --------------------------------------------------------  IN:    Oral Fluid: 150 mL    TPN (Total Parenteral Nutrition): 1679 mL  Total IN: 1829 mL    OUT:    Drain: 100 mL    Voided: 200 mL  Total OUT: 300 mL    Total NET: 1529 mL            Physical Exam:  General: NAD, resting comfortably in bed  C/V: NSR  Pulm: Nonlabored breathing, no respiratory distress  Abd: soft, NT/ND. dressing clean and dry. wound has healthy granulation tissue  Extrem: WWP, no edema, SCDs in place O/N: BRAYDEN  12/2: BRAYDEN, gallium scan performed       STATUS POST:  7/24: SBR resection, fistula resection, revision of esophagogegunostomy drain through esophageal hiatus in R mediastinum  7/29: Ex-lap, SB anastamosis in BP limb breakdown with succus throughout abdomen  8/1: abd washout, drainage of abscess, biologic mesh placement, closure of abdominal wall, vac and retention suture  8/7: abd washout, mesh removal, frozen abd noted, LLQ CHECO replaced with benson drain  8/10: leak noted from previous anastamosis site on L side, mid abdomen malecot drain placed in enterotomy, JPs x 2 placed, necrotic fascia debrided, vicryl mesh sutured to fascia    Vital Signs Last 24 Hrs  T(C): 36.4 (03 Dec 2017 05:36), Max: 36.9 (02 Dec 2017 17:37)  T(F): 97.6 (03 Dec 2017 05:36), Max: 98.5 (02 Dec 2017 17:37)  HR: 71 (03 Dec 2017 05:36) (71 - 80)  BP: 137/85 (03 Dec 2017 05:36) (134/85 - 148/85)  BP(mean): --  RR: 17 (03 Dec 2017 05:36) (17 - 17)  SpO2: 95% (03 Dec 2017 05:36) (95% - 96%)    I&O's Detail    02 Dec 2017 07:01  -  03 Dec 2017 07:00  --------------------------------------------------------  IN:    Oral Fluid: 150 mL    TPN (Total Parenteral Nutrition): 1679 mL  Total IN: 1829 mL    OUT:    Drain: 100 mL    Voided: 200 mL  Total OUT: 300 mL    Total NET: 1529 mL      Vancomycin Level, Trough (12.03.17 @ 06:46)    Vancomycin Level, Trough: 11.4:           Physical Exam:  General: NAD, resting comfortably in bed  C/V: NSR  Pulm: Nonlabored breathing, no respiratory distress  Abd: soft, NT/ND. dressing clean and dry. wound has healthy granulation tissue  Extrem: WWP, no edema, SCDs in place

## 2017-12-03 NOTE — PROVIDER CONTACT NOTE (OTHER) - NAME OF MD/NP/PA/DO NOTIFIED:
[facility]  Vaginal Delivery Progress Note    Patient Name: Imelda Landrum  :  1992  MRN:  [id number]      Subjective   Postpartum Day 1: Vaginal Delivery of a male infant.     The patient feels well without complaints.  Her pain is well controlled.  Reports normal lochia.     The patient plans to breastfeed.    Objective     Vital Signs Range for the last 24 hours  Temperature: Temp:  [97 °F (36.1 °C)-98 °F (36.7 °C)] 97.8 °F (36.6 °C)       BP: BP: (111-140)/(59-73) 111/66   Pulse: Heart Rate:  [62-90] 63   Respirations: Resp:  [16-18] 16                       Physical Exam:  General: Awake and alert  Abdomen: Fundus: firm, non tender,  below umbilicus  Extremities:  trace edema, NT     Labs:     Lab Results   Component Value Date    WBC 14.15 (H) 2017    HGB 11.8 (L) 2017    HCT 35.3 (L) 2017    MCV 88.5 2017     2017     Prenatal labs results reviewed:  Yes   Rubella:    Rh Status:    RH type   Date Value Ref Range Status   2017 Positive  Final         Assessment/Plan  : 1. PPD1 S/P  - Doing well, continue usual cares.   Doing very well... Desires circ and dw.      Active Problems:    * No active hospital problems. *          Ysabel Jacobo MD  12/3/2017  10:29 AM  
MD Meyers
DANNI Osorio

## 2017-12-03 NOTE — PROGRESS NOTE ADULT - ASSESSMENT
IMPRESSION: MRSA bacteremia.  Recurrent.  Patient stable    Recommend:  1.  Can continue Vancomycin but increase dose to 1 gram IV q12  2.  Will follow up gallium scan

## 2017-12-03 NOTE — PROGRESS NOTE ADULT - SUBJECTIVE AND OBJECTIVE BOX
O/N: BRAYDEN  12/2: BRAYDEN, gallium scan performed       STATUS POST:  7/24: SBR resection, fistula resection, revision of esophagogegunostomy drain through esophageal hiatus in R mediastinum  7/29: Ex-lap, SB anastamosis in BP limb breakdown with succus throughout abdomen  8/1: abd washout, drainage of abscess, biologic mesh placement, closure of abdominal wall, vac and retention suture  8/7: abd washout, mesh removal, frozen abd noted, LLQ CHECO replaced with benson drain  8/10: leak noted from previous anastamosis site on L side, mid abdomen malecot drain placed in enterotomy, JPs x 2 placed, necrotic fascia debrided, vicryl mesh sutured to fascia Vital Signs Last 24 Hrs  T(C): 36.4 (03 Dec 2017 05:36), Max: 36.9 (02 Dec 2017 17:37)  T(F): 97.6 (03 Dec 2017 05:36), Max: 98.5 (02 Dec 2017 17:37)  HR: 71 (03 Dec 2017 05:36) (71 - 80)  BP: 137/85 (03 Dec 2017 05:36) (134/85 - 148/85)  BP(mean): --  RR: 17 (03 Dec 2017 05:36) (17 - 17)  SpO2: 95% (03 Dec 2017 05:36) (95% - 96%)    I&O's Summary    01 Dec 2017 07:01  -  02 Dec 2017 07:00  --------------------------------------------------------  IN: 2164 mL / OUT: 150 mL / NET: 2014 mL    02 Dec 2017 07:01  -  03 Dec 2017 05:54  --------------------------------------------------------  IN: 1318 mL / OUT: 200 mL / NET: 1118 mL        Physical Exam:  General: NAD, resting comfortably in bed  C/V: NSR  Pulm: Nonlabored breathing, no respiratory distress  Abd: soft, NT/ND. dressing clean and dry. wound has healthy granulation tissue  Extrem: WWP, no edema, SCDs in place

## 2017-12-03 NOTE — PROGRESS NOTE ADULT - ASSESSMENT
57 F s/p  SBR, fistula resection, esophagojejunostomy revision w/ post-op course complicated by RTOR for distal anastomosis breakdown, ATN, acute respiratory failure, & septic shock, MRSA bacteremia which resolved. Currently for wound care management.   NPO with sips and chips /TPN/IVF   Pain/nausea control - only benadryl at midnight and dilaudid 0.25mg during PT session   ISS/OOB with PT daily  Nystatin powder, vancomycin 750 Q12 (restart 12/1 - ___, 10/11-11/8) ( Daptomycin 11/22-12/1 due to CK >1000)  SCDs, lovenox, OOBA  Lines :  L left subclavian line (10/4-- )  AM labs, weekly albumin, pre-albumin and triglyceride (every friday)  Wet to dry dressing in place  NGT in fistula

## 2017-12-04 LAB
ANION GAP SERPL CALC-SCNC: 11 MMOL/L — SIGNIFICANT CHANGE UP (ref 5–17)
BUN SERPL-MCNC: 38 MG/DL — HIGH (ref 7–23)
CALCIUM SERPL-MCNC: 10 MG/DL — SIGNIFICANT CHANGE UP (ref 8.4–10.5)
CHLORIDE SERPL-SCNC: 104 MMOL/L — SIGNIFICANT CHANGE UP (ref 96–108)
CO2 SERPL-SCNC: 27 MMOL/L — SIGNIFICANT CHANGE UP (ref 22–31)
CREAT SERPL-MCNC: 0.57 MG/DL — SIGNIFICANT CHANGE UP (ref 0.5–1.3)
GLUCOSE BLDC GLUCOMTR-MCNC: 110 MG/DL — HIGH (ref 70–99)
GLUCOSE BLDC GLUCOMTR-MCNC: 111 MG/DL — HIGH (ref 70–99)
GLUCOSE BLDC GLUCOMTR-MCNC: 112 MG/DL — HIGH (ref 70–99)
GLUCOSE BLDC GLUCOMTR-MCNC: 118 MG/DL — HIGH (ref 70–99)
GLUCOSE SERPL-MCNC: 130 MG/DL — HIGH (ref 70–99)
HCT VFR BLD CALC: 34.1 % — LOW (ref 34.5–45)
HGB BLD-MCNC: 10.2 G/DL — LOW (ref 11.5–15.5)
MAGNESIUM SERPL-MCNC: 2.2 MG/DL — SIGNIFICANT CHANGE UP (ref 1.6–2.6)
MCHC RBC-ENTMCNC: 27 PG — SIGNIFICANT CHANGE UP (ref 27–34)
MCHC RBC-ENTMCNC: 29.9 G/DL — LOW (ref 32–36)
MCV RBC AUTO: 90.2 FL — SIGNIFICANT CHANGE UP (ref 80–100)
PHOSPHATE SERPL-MCNC: 3.6 MG/DL — SIGNIFICANT CHANGE UP (ref 2.5–4.5)
PLATELET # BLD AUTO: 266 K/UL — SIGNIFICANT CHANGE UP (ref 150–400)
POTASSIUM SERPL-MCNC: 4.4 MMOL/L — SIGNIFICANT CHANGE UP (ref 3.5–5.3)
POTASSIUM SERPL-SCNC: 4.4 MMOL/L — SIGNIFICANT CHANGE UP (ref 3.5–5.3)
RBC # BLD: 3.78 M/UL — LOW (ref 3.8–5.2)
RBC # FLD: 16.6 % — SIGNIFICANT CHANGE UP (ref 10.3–16.9)
SODIUM SERPL-SCNC: 142 MMOL/L — SIGNIFICANT CHANGE UP (ref 135–145)
WBC # BLD: 5 K/UL — SIGNIFICANT CHANGE UP (ref 3.8–10.5)
WBC # FLD AUTO: 5 K/UL — SIGNIFICANT CHANGE UP (ref 3.8–10.5)

## 2017-12-04 PROCEDURE — 78807: CPT | Mod: 26

## 2017-12-04 PROCEDURE — 99233 SBSQ HOSP IP/OBS HIGH 50: CPT

## 2017-12-04 RX ORDER — DIPHENHYDRAMINE HCL 50 MG
25 CAPSULE ORAL ONCE
Qty: 0 | Refills: 0 | Status: COMPLETED | OUTPATIENT
Start: 2017-12-04 | End: 2017-12-04

## 2017-12-04 RX ORDER — ENOXAPARIN SODIUM 100 MG/ML
40 INJECTION SUBCUTANEOUS
Qty: 0 | Refills: 0 | Status: DISCONTINUED | OUTPATIENT
Start: 2017-12-04 | End: 2017-12-27

## 2017-12-04 RX ORDER — ACETAMINOPHEN 500 MG
1000 TABLET ORAL ONCE
Qty: 0 | Refills: 0 | Status: COMPLETED | OUTPATIENT
Start: 2017-12-04 | End: 2017-12-04

## 2017-12-04 RX ORDER — ELECTROLYTE SOLUTION,INJ
1 VIAL (ML) INTRAVENOUS
Qty: 0 | Refills: 0 | Status: DISCONTINUED | OUTPATIENT
Start: 2017-12-04 | End: 2017-12-04

## 2017-12-04 RX ORDER — I.V. FAT EMULSION 20 G/100ML
50 EMULSION INTRAVENOUS ONCE
Qty: 0 | Refills: 0 | Status: COMPLETED | OUTPATIENT
Start: 2017-12-04 | End: 2017-12-04

## 2017-12-04 RX ORDER — SODIUM CHLORIDE 9 MG/ML
1000 INJECTION INTRAMUSCULAR; INTRAVENOUS; SUBCUTANEOUS ONCE
Qty: 0 | Refills: 0 | Status: COMPLETED | OUTPATIENT
Start: 2017-12-04 | End: 2017-12-04

## 2017-12-04 RX ADMIN — Medication 1 APPLICATION(S): at 06:34

## 2017-12-04 RX ADMIN — ONDANSETRON 4 MILLIGRAM(S): 8 TABLET, FILM COATED ORAL at 17:57

## 2017-12-04 RX ADMIN — GABAPENTIN 100 MILLIGRAM(S): 400 CAPSULE ORAL at 06:35

## 2017-12-04 RX ADMIN — Medication 325 MILLIGRAM(S): at 21:08

## 2017-12-04 RX ADMIN — ENOXAPARIN SODIUM 40 MILLIGRAM(S): 100 INJECTION SUBCUTANEOUS at 17:29

## 2017-12-04 RX ADMIN — Medication 325 MILLIGRAM(S): at 02:28

## 2017-12-04 RX ADMIN — Medication 400 MILLIGRAM(S): at 17:32

## 2017-12-04 RX ADMIN — OCTREOTIDE ACETATE 450 MICROGRAM(S): 200 INJECTION, SOLUTION INTRAVENOUS; SUBCUTANEOUS at 17:30

## 2017-12-04 RX ADMIN — ENOXAPARIN SODIUM 30 MILLIGRAM(S): 100 INJECTION SUBCUTANEOUS at 06:35

## 2017-12-04 RX ADMIN — Medication 5 MILLIGRAM(S): at 21:08

## 2017-12-04 RX ADMIN — ESCITALOPRAM OXALATE 20 MILLIGRAM(S): 10 TABLET, FILM COATED ORAL at 21:08

## 2017-12-04 RX ADMIN — Medication 250 MILLIGRAM(S): at 17:29

## 2017-12-04 RX ADMIN — Medication 325 MILLIGRAM(S): at 22:08

## 2017-12-04 RX ADMIN — Medication 10 MILLIGRAM(S): at 17:30

## 2017-12-04 RX ADMIN — Medication 50 MILLIGRAM(S): at 21:08

## 2017-12-04 RX ADMIN — NYSTATIN CREAM 1 APPLICATION(S): 100000 CREAM TOPICAL at 06:34

## 2017-12-04 RX ADMIN — Medication 1000 MILLIGRAM(S): at 18:00

## 2017-12-04 RX ADMIN — ONDANSETRON 4 MILLIGRAM(S): 8 TABLET, FILM COATED ORAL at 06:35

## 2017-12-04 RX ADMIN — CALCITRIOL 1 MICROGRAM(S): 0.5 CAPSULE ORAL at 17:28

## 2017-12-04 RX ADMIN — Medication 325 MILLIGRAM(S): at 07:15

## 2017-12-04 RX ADMIN — Medication 325 MILLIGRAM(S): at 01:26

## 2017-12-04 RX ADMIN — Medication 325 MILLIGRAM(S): at 06:35

## 2017-12-04 RX ADMIN — I.V. FAT EMULSION 20.83 GRAM(S): 20 EMULSION INTRAVENOUS at 22:14

## 2017-12-04 RX ADMIN — Medication 10 MILLIGRAM(S): at 06:35

## 2017-12-04 RX ADMIN — Medication 1 EACH: at 17:32

## 2017-12-04 RX ADMIN — SODIUM CHLORIDE 1000 MILLILITER(S): 9 INJECTION INTRAMUSCULAR; INTRAVENOUS; SUBCUTANEOUS at 16:25

## 2017-12-04 RX ADMIN — Medication 25 MILLIGRAM(S): at 17:29

## 2017-12-04 RX ADMIN — GABAPENTIN 100 MILLIGRAM(S): 400 CAPSULE ORAL at 17:32

## 2017-12-04 RX ADMIN — Medication 250 MILLIGRAM(S): at 07:37

## 2017-12-04 RX ADMIN — PANTOPRAZOLE SODIUM 40 MILLIGRAM(S): 20 TABLET, DELAYED RELEASE ORAL at 06:35

## 2017-12-04 RX ADMIN — LOSARTAN POTASSIUM 50 MILLIGRAM(S): 100 TABLET, FILM COATED ORAL at 06:35

## 2017-12-04 NOTE — PROGRESS NOTE ADULT - SUBJECTIVE AND OBJECTIVE BOX
On interval followup for the new left int jugular catheter, the site is clean, dry and non-inflamed. Afebrile. Notes and the recent blood culture reports are followed.

## 2017-12-04 NOTE — PROGRESS NOTE ADULT - ASSESSMENT
IMPRESSION:  MRSA bacteremia, patient stable    Recommend:  1.  Continue vancomycin at 1 gram IV q12  2.  Check trough in AM on 12/5 30 min prior to dose (goal 15-20)  3.  Follow up gallium scan:  if no evidence of abscess or nidus of infection, patient will need 4 weeks of IV antibiotics (day 1 being 11/28, the date of the first negative blood culture) IMPRESSION:  MRSA bacteremia, patient stable    Recommend:  1.  Continue vancomycin at 1 gram IV q12  2.  Check trough in AM on 12/5 30 min prior to dose (goal 15-20)  3.  Follow up gallium scan:  if no evidence of abscess or nidus of infection, patient will need 4 weeks of IV antibiotics (day 1 being 11/28, the date of the first negative blood culture)  4.  Recheck CPK level to be sure if it is trending down

## 2017-12-04 NOTE — PROGRESS NOTE ADULT - SUBJECTIVE AND OBJECTIVE BOX
O/N: BRAYDEN. VSS  12/3 vanc trough 11.4, O/N: BRAYDEN. VSS  12/3 vanc trough 11.4,     STATUS POST:   7/24: SBR resection, fistula resection, revision of esophagogegunostomy drain through esophageal hiatus in R mediastinum  7/29: Ex-lap, SB anastamosis in BP limb breakdown with succus throughout abdomen  8/1: abd washout, drainage of abscess, biologic mesh placement, closure of abdominal wall, vac and retention suture  8/7: abd washout, mesh removal, frozen abd noted, LLQ CHECO replaced with benson drain  8/10: leak noted from previous anastamosis site on L side, mid abdomen malecot drain placed in enterotomy, JPs x 2 placed, necrotic fascia debrided, vicryl mesh sutured to fascia      SUBJECTIVE:  Patient seen and examined at bedside with surgery chief resident on AM rounds. Patient states she is feeling well, though dressing leaked overnight. Gallium scan performed per ID 12/2.      MEDICATIONS  (STANDING):  bismuth subsalicylate Liquid 30 milliLiter(s) Oral daily  calcitriol Injectable 1 MICROGram(s) IV Push <User Schedule>  collagenase Ointment 1 Application(s) Topical daily  cyanocobalamin Injectable 1000 MICROGram(s) SubCutaneous every 7 days  Dakins Solution - 1/2 Strength 1 Application(s) Topical daily  dextrose 5%. 1000 milliLiter(s) (50 mL/Hr) IV Continuous <Continuous>  dextrose 50% Injectable 25 Gram(s) IV Push once  dextrose 50% Injectable 12.5 Gram(s) IV Push once  diazepam    Tablet 5 milliGRAM(s) Oral at bedtime  diphenhydrAMINE   Injectable 50 milliGRAM(s) IV Push at bedtime  enoxaparin Injectable 30 milliGRAM(s) SubCutaneous two times a day  escitalopram 20 milliGRAM(s) Oral daily  gabapentin 100 milliGRAM(s) Oral two times a day  heparin  flush 100 Units/mL Injectable 100 Unit(s) IV Push every other day  heparin  flush 100 Units/mL Injectable 100 Unit(s) IV Push every other day  insulin lispro (HumaLOG) corrective regimen sliding scale   SubCutaneous every 6 hours  lidocaine   Patch 1 Patch Transdermal daily  losartan 50 milliGRAM(s) Oral daily  nystatin Powder 1 Application(s) Topical two times a day  octreotide  Injectable 450 MICROGram(s) IV Push daily  oxybutynin 10 milliGRAM(s) Oral two times a day  pantoprazole  Injectable 40 milliGRAM(s) IV Push daily  Parenteral Nutrition - Adult 1 Each (73 mL/Hr) TPN Continuous <Continuous>  sodium chloride 0.9% lock flush 20 milliLiter(s) IV Push once  vancomycin  IVPB 1000 milliGRAM(s) IV Intermittent every 12 hours    MEDICATIONS  (PRN):  acetaminophen   Tablet. 325 milliGRAM(s) Oral every 4 hours PRN Moderate Pain (4 - 6)  ondansetron Injectable 4 milliGRAM(s) IV Push every 6 hours PRN Nausea and/or Vomiting  sodium chloride 0.9% lock flush 10 milliLiter(s) IV Push every 1 hour PRN After each medication administration  sodium chloride 0.9% lock flush 10 milliLiter(s) IV Push every 12 hours PRN Lumen of catheter NOT used      Vital Signs Last 24 Hrs  T(C): 36.2 (04 Dec 2017 06:30), Max: 37 (03 Dec 2017 20:26)  T(F): 97.1 (04 Dec 2017 06:30), Max: 98.6 (03 Dec 2017 20:26)  HR: 76 (04 Dec 2017 06:30) (69 - 83)  BP: 130/83 (04 Dec 2017 06:30) (119/82 - 137/83)  BP(mean): --  RR: 17 (04 Dec 2017 06:30) (16 - 17)  SpO2: 94% (04 Dec 2017 06:30) (94% - 97%)    PHYSICAL EXAM:      Constitutional: A&Ox3    Respiratory: non labored breathing, no respiratory distress    Gastrointestinal: soft, non-tender, non-distended                 Incision: open adominal wound with granulation tissue; fistula manager in place with leaking from the inferior aspect, NGT in wound in place    Extremities: (-) edema      I&O's Detail    03 Dec 2017 07:01  -  04 Dec 2017 07:00  --------------------------------------------------------  IN:    TPN (Total Parenteral Nutrition): 1752 mL  Total IN: 1752 mL    OUT:    Drain: 420 mL  Total OUT: 420 mL    Total NET: 1332 mL        LABS:                        10.2   5.0   )-----------( 266      ( 04 Dec 2017 06:44 )             34.1     12-03    143  |  104  |  40<H>  ----------------------------<  119<H>  4.4   |  29  |  0.63    Ca    10.2      03 Dec 2017 06:46  Phos  3.8     12-03  Mg     2.2     12-03            RADIOLOGY & ADDITIONAL STUDIES:

## 2017-12-04 NOTE — PROGRESS NOTE ADULT - ASSESSMENT
57 F s/p  SBR, fistula resection, esophagojejunostomy revision w/ post-op course complicated by RTOR for distal anastomosis breakdown, ATN, acute respiratory failure, & septic shock, MRSA bacteremia which resolved. Currently for wound care management.     CLD/TPN   Pain/nausea control - only benadryl at midnight and dilaudid 0.25mg during PT session   ISS/OOB with PT daily  Nystatin powder, vancomycin 750 Q12 (10/11-11/8) ( Daptomycin 11/22-12/1) Vanco restarted 12/1  SCDs, lovenox (therapeutic), OOBA  Lines :  L left subclavian line (10/4-12/2), L IJ (12/2--)  AM labs, weekly albumin, pre-albumin and triglyceride (every friday).  Ad daily CK level  Wet to dry dressing in place  NGT in fistula 57 F s/p  SBR, fistula resection, esophagojejunostomy revision w/ post-op course complicated by RTOR for distal anastomosis breakdown, ATN, acute respiratory failure, & septic shock, MRSA bacteremia which resolved. Currently for wound care management.   - CLD/TPN   - Pain/nausea control - only benadryl at midnight and dilaudid 0.25mg during PT session   - ISS/OOB with PT daily  - Continue Abx and follow up with ID re:necessity for Vanco, duration of meds  - Continue lovenox   - Lines :  L left subclavian line (10/4-12/2), L IJ (12/2--)  - AM labs, weekly albumin, pre-albumin and triglyceride (every friday).  Ad daily CK level  - Wound dressing to be changed today with both surgery and wound care team  - NGT in fistula

## 2017-12-04 NOTE — PROGRESS NOTE ADULT - SUBJECTIVE AND OBJECTIVE BOX
INTERVAL HPI/OVERNIGHT EVENTS:    Patient was seen and examined at bedside.  Myalgias have improved.  No fevers.  s/p gallium scan over the weekend    CONSTITUTIONAL:  Negative fever or chills, feels well, good appetite  EYES:  Negative  blurry vision or double vision  CARDIOVASCULAR:  Negative for chest pain or palpitations  RESPIRATORY:  Negative for cough, wheezing, or SOB   GASTROINTESTINAL:  Negative for nausea, vomiting, diarrhea, constipation, or abdominal pain  GENITOURINARY:  Negative frequency, urgency or dysuria  NEUROLOGIC:  No headache, confusion, dizziness, lightheadedness      ANTIBIOTICS/RELEVANT:    MEDICATIONS  (STANDING):  bismuth subsalicylate Liquid 30 milliLiter(s) Oral daily  calcitriol Injectable 1 MICROGram(s) IV Push <User Schedule>  collagenase Ointment 1 Application(s) Topical daily  cyanocobalamin Injectable 1000 MICROGram(s) SubCutaneous every 7 days  Dakins Solution - 1/2 Strength 1 Application(s) Topical daily  dextrose 5%. 1000 milliLiter(s) (50 mL/Hr) IV Continuous <Continuous>  dextrose 50% Injectable 25 Gram(s) IV Push once  dextrose 50% Injectable 12.5 Gram(s) IV Push once  diazepam    Tablet 5 milliGRAM(s) Oral at bedtime  diphenhydrAMINE   Injectable 50 milliGRAM(s) IV Push at bedtime  enoxaparin Injectable 30 milliGRAM(s) SubCutaneous two times a day  escitalopram 20 milliGRAM(s) Oral daily  gabapentin 100 milliGRAM(s) Oral two times a day  heparin  flush 100 Units/mL Injectable 100 Unit(s) IV Push every other day  heparin  flush 100 Units/mL Injectable 100 Unit(s) IV Push every other day  insulin lispro (HumaLOG) corrective regimen sliding scale   SubCutaneous every 6 hours  lidocaine   Patch 1 Patch Transdermal daily  losartan 50 milliGRAM(s) Oral daily  nystatin Powder 1 Application(s) Topical two times a day  octreotide  Injectable 450 MICROGram(s) IV Push daily  oxybutynin 10 milliGRAM(s) Oral two times a day  pantoprazole  Injectable 40 milliGRAM(s) IV Push daily  Parenteral Nutrition - Adult 1 Each (73 mL/Hr) TPN Continuous <Continuous>  sodium chloride 0.9% lock flush 20 milliLiter(s) IV Push once  vancomycin  IVPB 1000 milliGRAM(s) IV Intermittent every 12 hours    MEDICATIONS  (PRN):  acetaminophen   Tablet. 325 milliGRAM(s) Oral every 4 hours PRN Moderate Pain (4 - 6)  ondansetron Injectable 4 milliGRAM(s) IV Push every 6 hours PRN Nausea and/or Vomiting  sodium chloride 0.9% lock flush 10 milliLiter(s) IV Push every 1 hour PRN After each medication administration  sodium chloride 0.9% lock flush 10 milliLiter(s) IV Push every 12 hours PRN Lumen of catheter NOT used        Vital Signs Last 24 Hrs  T(C): 36.2 (04 Dec 2017 06:30), Max: 37 (03 Dec 2017 20:26)  T(F): 97.1 (04 Dec 2017 06:30), Max: 98.6 (03 Dec 2017 20:26)  HR: 76 (04 Dec 2017 06:30) (69 - 83)  BP: 130/83 (04 Dec 2017 06:30) (119/82 - 137/83)  BP(mean): --  RR: 17 (04 Dec 2017 06:30) (16 - 17)  SpO2: 94% (04 Dec 2017 06:30) (94% - 97%)    PHYSICAL EXAM:  Constitutional: non-toxic, no distress  Eyes: no icterus  Ear/Nose/Throat: no oral lesion, no sinus tenderness on percussion	  Neck:no JVD, no lymphadenopathy, supple, left sided central line  Respiratory: CTA eileen  Cardiovascular: S1S2 RRR, no murmurs  Gastrointestinal:soft, (+) BS, no HSM; open abdominal wound with wound vac  Extremities:no edema  Vascular: DP Pulse:	right normal; left normal      LABS:                        10.2   5.0   )-----------( 266      ( 04 Dec 2017 06:44 )             34.1     12-04    142  |  104  |  38<H>  ----------------------------<  130<H>  4.4   |  27  |  0.57    Ca    10.0      04 Dec 2017 06:44  Phos  3.6     12-04  Mg     2.2     12-04            MICROBIOLOGY:  Culture - Blood (11.28.17 @ 08:18)    Specimen Source: .Blood Blood    Culture Results:   No growth at 5 days.        Culture - Blood (11.20.17 @ 20:45)    Gram Stain:   Anaerobic Bottle: Gram Positive Cocci in Clusters  Result called to and read back by_ Ms. BRAD Mccormack RN  11/21/2017  20:35:55    -  Penicillin: R 8    -  Clindamycin: R <=0.5    -  Erythromycin: R >4    -  RIF- Rifampin: S <=1    -  Tetra/Doxy: S <=4    -  Trimethoprim/Sulfamethoxazole: S <=0.5/9.5    -  Cefazolin: R 16    -  Linezolid: S 2    -  Oxacillin: R >2    -  Daptomycin: S <=0.5    -  Vancomycin: S 1    Specimen Source: .Blood Blood    Organism: Methicillin resistant Staphylococcus aureus    Organism: Methicillin resistant Staphylococcus aureus    Culture Results:   Growth in anaerobic bottle: Methicillin resistant Staphylococcus aureus  Floor previously notified.  Aerobic Bottle: No growth    Organism Identification: Methicillin resistant Staphylococcus aureus  Methicillin resistant Staphylococcus aureus    Method Type: ALISHA    Method Type: ETEST    RADIOLOGY & ADDITIONAL STUDIES:

## 2017-12-04 NOTE — ADVANCED PRACTICE NURSE CONSULT - ASSESSMENT
Wound bed with pale red, nongranular tissue, wound edges epithelializing multiple fistulae in wound bed draining light green and dark brown effluent. Wound measuring 10.5 cm x 8 cm x 0.2 cm. Deborah wound skin fungal rash noted at 12 o'clock and left lateral aspect of periwound.  Applied nystatin powder to fungal rash, sealed with liquid skin barrier. Applied xeroform dressing to wound bed, then wet to damp Kerlix  to wound bed, Capo ring to skin fold at 4 o'clock, skin barriers to wound edges, Capo ring to edges of wound manager, then Stoma paste to edges of wound manager and covered with transparent dressing.  Maintained NG tube in fistula on left lateral side to low wall suction as ordered by Dr Brady.   Toya RAZO, present during application of pouch.

## 2017-12-05 LAB
ALBUMIN SERPL ELPH-MCNC: 3.1 G/DL — LOW (ref 3.3–5)
ALP SERPL-CCNC: 242 U/L — HIGH (ref 40–120)
ALT FLD-CCNC: 72 U/L — HIGH (ref 10–45)
ANION GAP SERPL CALC-SCNC: 13 MMOL/L — SIGNIFICANT CHANGE UP (ref 5–17)
AST SERPL-CCNC: 44 U/L — HIGH (ref 10–40)
BASOPHILS NFR BLD AUTO: 0.2 % — SIGNIFICANT CHANGE UP (ref 0–2)
BILIRUB SERPL-MCNC: 0.6 MG/DL — SIGNIFICANT CHANGE UP (ref 0.2–1.2)
BUN SERPL-MCNC: 34 MG/DL — HIGH (ref 7–23)
CALCIUM SERPL-MCNC: 9.5 MG/DL — SIGNIFICANT CHANGE UP (ref 8.4–10.5)
CHLORIDE SERPL-SCNC: 100 MMOL/L — SIGNIFICANT CHANGE UP (ref 96–108)
CK SERPL-CCNC: 105 U/L — SIGNIFICANT CHANGE UP (ref 25–170)
CO2 SERPL-SCNC: 26 MMOL/L — SIGNIFICANT CHANGE UP (ref 22–31)
CREAT SERPL-MCNC: 0.52 MG/DL — SIGNIFICANT CHANGE UP (ref 0.5–1.3)
EOSINOPHIL NFR BLD AUTO: 6.7 % — HIGH (ref 0–6)
GLUCOSE BLDC GLUCOMTR-MCNC: 111 MG/DL — HIGH (ref 70–99)
GLUCOSE BLDC GLUCOMTR-MCNC: 117 MG/DL — HIGH (ref 70–99)
GLUCOSE BLDC GLUCOMTR-MCNC: 121 MG/DL — HIGH (ref 70–99)
GLUCOSE BLDC GLUCOMTR-MCNC: 123 MG/DL — HIGH (ref 70–99)
GLUCOSE BLDC GLUCOMTR-MCNC: 96 MG/DL — SIGNIFICANT CHANGE UP (ref 70–99)
GLUCOSE SERPL-MCNC: 128 MG/DL — HIGH (ref 70–99)
HCT VFR BLD CALC: 32.4 % — LOW (ref 34.5–45)
HGB BLD-MCNC: 9.7 G/DL — LOW (ref 11.5–15.5)
LYMPHOCYTES # BLD AUTO: 28 % — SIGNIFICANT CHANGE UP (ref 13–44)
MAGNESIUM SERPL-MCNC: 2 MG/DL — SIGNIFICANT CHANGE UP (ref 1.6–2.6)
MCHC RBC-ENTMCNC: 27 PG — SIGNIFICANT CHANGE UP (ref 27–34)
MCHC RBC-ENTMCNC: 29.9 G/DL — LOW (ref 32–36)
MCV RBC AUTO: 90.3 FL — SIGNIFICANT CHANGE UP (ref 80–100)
MONOCYTES NFR BLD AUTO: 8.9 % — SIGNIFICANT CHANGE UP (ref 2–14)
NEUTROPHILS NFR BLD AUTO: 56.2 % — SIGNIFICANT CHANGE UP (ref 43–77)
PHOSPHATE SERPL-MCNC: 3.4 MG/DL — SIGNIFICANT CHANGE UP (ref 2.5–4.5)
PLATELET # BLD AUTO: 240 K/UL — SIGNIFICANT CHANGE UP (ref 150–400)
POTASSIUM SERPL-MCNC: 4.3 MMOL/L — SIGNIFICANT CHANGE UP (ref 3.5–5.3)
POTASSIUM SERPL-SCNC: 4.3 MMOL/L — SIGNIFICANT CHANGE UP (ref 3.5–5.3)
PREALB SERPL-MCNC: 34 MG/DL — SIGNIFICANT CHANGE UP (ref 20–40)
PROT SERPL-MCNC: 6.8 G/DL — SIGNIFICANT CHANGE UP (ref 6–8.3)
RBC # BLD: 3.59 M/UL — LOW (ref 3.8–5.2)
RBC # FLD: 16.3 % — SIGNIFICANT CHANGE UP (ref 10.3–16.9)
SODIUM SERPL-SCNC: 139 MMOL/L — SIGNIFICANT CHANGE UP (ref 135–145)
VANCOMYCIN TROUGH SERPL-MCNC: 17.4 UG/ML — SIGNIFICANT CHANGE UP (ref 10–20)
WBC # BLD: 4.6 K/UL — SIGNIFICANT CHANGE UP (ref 3.8–10.5)
WBC # FLD AUTO: 4.6 K/UL — SIGNIFICANT CHANGE UP (ref 3.8–10.5)

## 2017-12-05 PROCEDURE — 99233 SBSQ HOSP IP/OBS HIGH 50: CPT

## 2017-12-05 RX ORDER — HYDROMORPHONE HYDROCHLORIDE 2 MG/ML
0.5 INJECTION INTRAMUSCULAR; INTRAVENOUS; SUBCUTANEOUS ONCE
Qty: 0 | Refills: 0 | Status: DISCONTINUED | OUTPATIENT
Start: 2017-12-05 | End: 2017-12-05

## 2017-12-05 RX ORDER — ELECTROLYTE SOLUTION,INJ
1 VIAL (ML) INTRAVENOUS
Qty: 0 | Refills: 0 | Status: DISCONTINUED | OUTPATIENT
Start: 2017-12-05 | End: 2017-12-05

## 2017-12-05 RX ADMIN — PANTOPRAZOLE SODIUM 40 MILLIGRAM(S): 20 TABLET, DELAYED RELEASE ORAL at 06:54

## 2017-12-05 RX ADMIN — Medication 10 MILLIGRAM(S): at 06:53

## 2017-12-05 RX ADMIN — Medication 325 MILLIGRAM(S): at 21:27

## 2017-12-05 RX ADMIN — Medication 325 MILLIGRAM(S): at 22:27

## 2017-12-05 RX ADMIN — Medication 1 EACH: at 18:14

## 2017-12-05 RX ADMIN — ENOXAPARIN SODIUM 40 MILLIGRAM(S): 100 INJECTION SUBCUTANEOUS at 18:14

## 2017-12-05 RX ADMIN — Medication 325 MILLIGRAM(S): at 02:28

## 2017-12-05 RX ADMIN — HYDROMORPHONE HYDROCHLORIDE 0.5 MILLIGRAM(S): 2 INJECTION INTRAMUSCULAR; INTRAVENOUS; SUBCUTANEOUS at 17:26

## 2017-12-05 RX ADMIN — ONDANSETRON 4 MILLIGRAM(S): 8 TABLET, FILM COATED ORAL at 12:48

## 2017-12-05 RX ADMIN — Medication 250 MILLIGRAM(S): at 18:23

## 2017-12-05 RX ADMIN — Medication 5 MILLIGRAM(S): at 21:27

## 2017-12-05 RX ADMIN — Medication 325 MILLIGRAM(S): at 12:48

## 2017-12-05 RX ADMIN — LOSARTAN POTASSIUM 50 MILLIGRAM(S): 100 TABLET, FILM COATED ORAL at 06:54

## 2017-12-05 RX ADMIN — Medication 325 MILLIGRAM(S): at 07:49

## 2017-12-05 RX ADMIN — Medication 325 MILLIGRAM(S): at 03:28

## 2017-12-05 RX ADMIN — Medication 250 MILLIGRAM(S): at 06:53

## 2017-12-05 RX ADMIN — NYSTATIN CREAM 1 APPLICATION(S): 100000 CREAM TOPICAL at 06:53

## 2017-12-05 RX ADMIN — ONDANSETRON 4 MILLIGRAM(S): 8 TABLET, FILM COATED ORAL at 06:54

## 2017-12-05 RX ADMIN — ENOXAPARIN SODIUM 40 MILLIGRAM(S): 100 INJECTION SUBCUTANEOUS at 06:54

## 2017-12-05 RX ADMIN — ONDANSETRON 4 MILLIGRAM(S): 8 TABLET, FILM COATED ORAL at 00:14

## 2017-12-05 RX ADMIN — Medication 1 APPLICATION(S): at 06:53

## 2017-12-05 RX ADMIN — GABAPENTIN 100 MILLIGRAM(S): 400 CAPSULE ORAL at 18:14

## 2017-12-05 RX ADMIN — ESCITALOPRAM OXALATE 20 MILLIGRAM(S): 10 TABLET, FILM COATED ORAL at 21:26

## 2017-12-05 RX ADMIN — Medication 325 MILLIGRAM(S): at 06:54

## 2017-12-05 RX ADMIN — OCTREOTIDE ACETATE 450 MICROGRAM(S): 200 INJECTION, SOLUTION INTRAVENOUS; SUBCUTANEOUS at 12:49

## 2017-12-05 RX ADMIN — Medication 50 MILLIGRAM(S): at 21:58

## 2017-12-05 RX ADMIN — Medication 10 MILLIGRAM(S): at 18:14

## 2017-12-05 RX ADMIN — NYSTATIN CREAM 1 APPLICATION(S): 100000 CREAM TOPICAL at 18:14

## 2017-12-05 RX ADMIN — GABAPENTIN 100 MILLIGRAM(S): 400 CAPSULE ORAL at 06:53

## 2017-12-05 RX ADMIN — ONDANSETRON 4 MILLIGRAM(S): 8 TABLET, FILM COATED ORAL at 21:27

## 2017-12-05 NOTE — PROGRESS NOTE ADULT - ASSESSMENT
IMPRESSION:  MRSA bacteremia.  Blood cultures are negative.  Gallium does not show any occult abscess or drainable focus.  Vanco trough is appropriate    Recommend:  1.  Continue vancomycin at 1 gram IV q12.  Needs 4 weeks total (day 1 = 11/28/2017.  Last day = 12/26/2017).  PO antibiotics are not an option for MRSA bacteremia  2.  Can check trough every 72 hrs before dose in AM

## 2017-12-05 NOTE — PROGRESS NOTE ADULT - ASSESSMENT
57 F s/p  SBR, fistula resection, esophagojejunostomy revision w/ post-op course complicated by RTOR for distal anastomosis breakdown, ATN, acute respiratory failure, & septic shock, MRSA bacteremia which resolved. Currently for wound care management.     CLD/TPN   Pain/nausea control - only benadryl at midnight and dilaudid 0.25mg during PT session   ISS/OOB with PT daily  Nystatin powder, vancomycin 750 Q12 (10/11-11/8) ( Daptomycin 11/22-12/1) Vanco restarted 12/1  SCDs, lovenox (therapeutic), OOBA  Lines :  L left subclavian line (10/4-12/2), L IJ (12/2--)  AM labs, weekly albumin, pre-albumin and triglyceride (every friday).  Ad daily CK level  Wet to dry dressing in place 57 F s/p  SBR, fistula resection, esophagojejunostomy revision w/ post-op course complicated by RTOR for distal anastomosis breakdown, ATN, acute respiratory failure, & septic shock, MRSA bacteremia which resolved. Currently for wound care management.     f/u gallium scan  consult ID regarding possible DC of vanco     CLD/TPN   Pain/nausea control - only benadryl at midnight and dilaudid 0.25mg during PT session   ISS/OOB with PT daily  Nystatin powder, vancomycin 750 Q12 (10/11-11/8) ( Daptomycin 11/22-12/1) Vanco restarted 12/1  SCDs, lovenox (therapeutic), OOBA  Lines :  L left subclavian line (10/4-12/2), L IJ (12/2--)  AM labs, weekly albumin, pre-albumin and triglyceride (every friday).  Ad daily CK level  Wet to dry dressing in place

## 2017-12-05 NOTE — PROGRESS NOTE ADULT - SUBJECTIVE AND OBJECTIVE BOX
remains stable and texts me or calls regarding wanting to eat which I am compassionate about but with her fistula this will come right ou and only inhibit wound healing especially if has particles and jams suction  I have explained incessantly   is very supportive  I would like to see if way to portabilize the suction system  wound is down to 6 by 10 cm approximately  will allow to granulate around exposed bowel and then will require reconstruction  goal of waiting is to try to reduce the amount of bowel that has to be sacrificed and minimize the chance of short bowel syndrome  will continue to discuss with Dr Ruiz

## 2017-12-05 NOTE — PROGRESS NOTE ADULT - SUBJECTIVE AND OBJECTIVE BOX
INTERVAL HPI/OVERNIGHT EVENTS:    Patient was seen and examined at bedside.  No complaints    CONSTITUTIONAL:  Negative fever or chills, feels well, good appetite  EYES:  Negative  blurry vision or double vision  CARDIOVASCULAR:  Negative for chest pain or palpitations  RESPIRATORY:  Negative for cough, wheezing, or SOB   GASTROINTESTINAL:  Negative for nausea, vomiting, diarrhea, constipation, or abdominal pain  GENITOURINARY:  Negative frequency, urgency or dysuria  NEUROLOGIC:  No headache, confusion, dizziness, lightheadedness      ANTIBIOTICS/RELEVANT:    MEDICATIONS  (STANDING):  bismuth subsalicylate Liquid 30 milliLiter(s) Oral daily  calcitriol Injectable 1 MICROGram(s) IV Push <User Schedule>  collagenase Ointment 1 Application(s) Topical daily  cyanocobalamin Injectable 1000 MICROGram(s) SubCutaneous every 7 days  dextrose 5%. 1000 milliLiter(s) (50 mL/Hr) IV Continuous <Continuous>  dextrose 50% Injectable 25 Gram(s) IV Push once  dextrose 50% Injectable 12.5 Gram(s) IV Push once  diazepam    Tablet 5 milliGRAM(s) Oral at bedtime  diphenhydrAMINE   Injectable 50 milliGRAM(s) IV Push at bedtime  enoxaparin Injectable 40 milliGRAM(s) SubCutaneous two times a day  escitalopram 20 milliGRAM(s) Oral daily  gabapentin 100 milliGRAM(s) Oral two times a day  heparin  flush 100 Units/mL Injectable 100 Unit(s) IV Push every other day  heparin  flush 100 Units/mL Injectable 100 Unit(s) IV Push every other day  insulin lispro (HumaLOG) corrective regimen sliding scale   SubCutaneous every 6 hours  lidocaine   Patch 1 Patch Transdermal daily  losartan 50 milliGRAM(s) Oral daily  nystatin Powder 1 Application(s) Topical two times a day  octreotide  Injectable 450 MICROGram(s) IV Push daily  oxybutynin 10 milliGRAM(s) Oral two times a day  pantoprazole  Injectable 40 milliGRAM(s) IV Push daily  Parenteral Nutrition - Adult 1 Each (73 mL/Hr) TPN Continuous <Continuous>  Parenteral Nutrition - Adult 1 Each (73 mL/Hr) TPN Continuous <Continuous>  sodium chloride 0.9% lock flush 20 milliLiter(s) IV Push once  vancomycin  IVPB 1000 milliGRAM(s) IV Intermittent every 12 hours    MEDICATIONS  (PRN):  acetaminophen   Tablet. 325 milliGRAM(s) Oral every 4 hours PRN Moderate Pain (4 - 6)  ondansetron Injectable 4 milliGRAM(s) IV Push every 6 hours PRN Nausea and/or Vomiting  sodium chloride 0.9% lock flush 10 milliLiter(s) IV Push every 1 hour PRN After each medication administration  sodium chloride 0.9% lock flush 10 milliLiter(s) IV Push every 12 hours PRN Lumen of catheter NOT used        Vital Signs Last 24 Hrs  T(C): 36.6 (05 Dec 2017 05:31), Max: 36.6 (05 Dec 2017 05:31)  T(F): 97.9 (05 Dec 2017 05:31), Max: 97.9 (05 Dec 2017 05:31)  HR: 70 (05 Dec 2017 05:31) (67 - 71)  BP: 146/79 (05 Dec 2017 05:31) (124/82 - 146/79)  BP(mean): --  RR: 17 (05 Dec 2017 05:31) (16 - 17)  SpO2: 96% (05 Dec 2017 05:31) (94% - 96%)    PHYSICAL EXAM:  Constitutional: non-toxic, no distress  Eyes:  no icterus  Ear/Nose/Throat: no sinus tenderness on percussion	  Neck:  supple  Respiratory: clear bilaterally  Cardiovascular: S1S2 RRR, no murmurs  Gastrointestinal:soft, (+) BS, no HSM; abdominal wound clean  Extremities:no edema  Vascular: DP Pulse:	right normal; left normal      LABS:                        9.7    4.6   )-----------( 240      ( 05 Dec 2017 06:30 )             32.4     12-05    139  |  100  |  34<H>  ----------------------------<  128<H>  4.3   |  26  |  0.52    Ca    9.5      05 Dec 2017 06:29  Phos  3.4     12-05  Mg     2.0     12-05    TPro  6.8  /  Alb  3.1<L>  /  TBili  0.6  /  DBili  x   /  AST  44<H>  /  ALT  72<H>  /  AlkPhos  242<H>  12-05      vanco level: 17.4    MICROBIOLOGY:    RADIOLOGY & ADDITIONAL STUDIES:  < from: NM Inflammatory Loc Wholebody, Gallium (12.03.17 @ 10:04) >  Impression: Increased activity in the region of the patient's   enterocutaneous fistula. No other abnormal gallium uptake is seen.

## 2017-12-05 NOTE — CHART NOTE - NSCHARTNOTEFT_GEN_A_CORE
Admitting Diagnosis:   Patient is a 57y old  Female who presents with a chief complaint of enterocutaneous fistula (24 Jul 2017 14:15)      PAST MEDICAL & SURGICAL HISTORY:  Reflux  Obesity  DVT (deep venous thrombosis): 2013 neck  Diverticulitis  Hypertension  Elective surgery: abodominal wall surgery  dec 2016  Elective surgery: NOV 2016 gastric bypass revision  Gastric bypass status for obesity: gastric sleeve, 6/2012  S/P breast biopsy: 2011, left  S/P colon resection: 2011  S/P knee surgery: repair 2009, 2011  right; left  Umbilical hernia: 2000  S/P appendectomy: 1975  S/P tonsillectomy: 1967    Current Nutrition Order:  Clear Liquid Diet (w/ exceptions)  >> Per discussion w/ team, pt is allowed to have 1 flavored Ice at breakfast and dinner and a soup broth at lunch. Increased PO intake compromises the integrity of fistulas healing.    >> TPN via central venous line:  1752 TV (73ml/hr); 355g D, 140g AA, 50g 20% lipids (1767kcal no lipid calories) and 50g 20% lipids; total kcal w/ lipids: (2267 kcal, 2.7g/kg IBW pro, GIR 3.30)    PO Intake: Good (%) [   ]  Fair (50-75%) [   ] Poor (<25%) [ x  ] 1 Clear liquid option at each meal    GI Issues: No reported GI distress at this time.    Pain: No C/o pain.    Skin Integrity: sacrum un-stageable PU; sacral abrasion; abd wound--> NGtube placed w/ sponge through pouch into fistula for suction    Labs:   12-05    139  |  100  |  34<H>  ----------------------------<  128<H>  4.3   |  26  |  0.52    Ca    9.5      05 Dec 2017 06:29  Phos  3.4     12-05  Mg     2.0     12-05    TPro  6.8  /  Alb  3.1<L>  /  TBili  0.6  /  DBili  x   /  AST  44<H>  /  ALT  72<H>  /  AlkPhos  242<H>  12-05    CAPILLARY BLOOD GLUCOSE      POCT Blood Glucose.: 96 mg/dL (05 Dec 2017 12:14)  POCT Blood Glucose.: 117 mg/dL (05 Dec 2017 06:32)  POCT Blood Glucose.: 123 mg/dL (04 Dec 2017 23:55)  POCT Blood Glucose.: 112 mg/dL (04 Dec 2017 17:30)      Medications:  MEDICATIONS  (STANDING):  bismuth subsalicylate Liquid 30 milliLiter(s) Oral daily  calcitriol Injectable 1 MICROGram(s) IV Push <User Schedule>  collagenase Ointment 1 Application(s) Topical daily  cyanocobalamin Injectable 1000 MICROGram(s) SubCutaneous every 7 days  dextrose 5%. 1000 milliLiter(s) (50 mL/Hr) IV Continuous <Continuous>  dextrose 50% Injectable 25 Gram(s) IV Push once  dextrose 50% Injectable 12.5 Gram(s) IV Push once  diazepam    Tablet 5 milliGRAM(s) Oral at bedtime  diphenhydrAMINE   Injectable 50 milliGRAM(s) IV Push at bedtime  enoxaparin Injectable 40 milliGRAM(s) SubCutaneous two times a day  escitalopram 20 milliGRAM(s) Oral daily  gabapentin 100 milliGRAM(s) Oral two times a day  heparin  flush 100 Units/mL Injectable 100 Unit(s) IV Push every other day  heparin  flush 100 Units/mL Injectable 100 Unit(s) IV Push every other day  insulin lispro (HumaLOG) corrective regimen sliding scale   SubCutaneous every 6 hours  lidocaine   Patch 1 Patch Transdermal daily  losartan 50 milliGRAM(s) Oral daily  nystatin Powder 1 Application(s) Topical two times a day  octreotide  Injectable 450 MICROGram(s) IV Push daily  oxybutynin 10 milliGRAM(s) Oral two times a day  pantoprazole  Injectable 40 milliGRAM(s) IV Push daily  Parenteral Nutrition - Adult 1 Each (73 mL/Hr) TPN Continuous <Continuous>  Parenteral Nutrition - Adult 1 Each (73 mL/Hr) TPN Continuous <Continuous>  sodium chloride 0.9% lock flush 20 milliLiter(s) IV Push once  vancomycin  IVPB 1000 milliGRAM(s) IV Intermittent every 12 hours    MEDICATIONS  (PRN):  acetaminophen   Tablet. 325 milliGRAM(s) Oral every 4 hours PRN Moderate Pain (4 - 6)  ondansetron Injectable 4 milliGRAM(s) IV Push every 6 hours PRN Nausea and/or Vomiting  sodium chloride 0.9% lock flush 10 milliLiter(s) IV Push every 1 hour PRN After each medication administration  sodium chloride 0.9% lock flush 10 milliLiter(s) IV Push every 12 hours PRN Lumen of catheter NOT used      Weight:  Bed Scale:  155.9lb (12/5)  162.0lb (11/30)  161.5lb (11/13)  160.5lb (11/8)   Standing Scale:  167lb (11/13)  164lb (10/17)  219lb (8/1)    Weight Change:   03/2016: 89kg  02/2017: 74kg   07/2017: 76kg  11/2017: 76kg     >>Bed scale wt indicates wts are trending down. Spoke w/ RN about obtaining standing wt for accuracy. Will follow and adjust nutrient intake as needed.     Estimated energy needs:   Estimated energy needs using 52 kg IBW:  increased needs per wound and pressure ulcer healing. needs calculated based on TPN as primary route of nutrition.  30-35 kcal/kg (8102-2973 kcal).   1.8-2 g/kg ( g protein).  30-35 mL/kg (5826-3128 mL fluid).     Subjective:   Pt now ordered for clear liquid diet. Per team- pt is allowed to have one clear liquid item at each meal. pt is having an flavored water ice for breakfast and dinner and a broth for lunch.   TPN bag infusing at 73ml/hr providing 355g Dex, 140g AA, 50g 20% lipids (2267 kcal, 2.7g/kg IBW pro, GIR 3.30).   Please update weights weekly to trend adequate of nutrient intake via TPN. Bed scale wt has been trending down ~5lbs. Please use standing scale for accuracy.     Previous Nutrition Diagnosis: Increased nutrient needs RT increased demand for nutrients AEB wound and pressure ulcer healing   Active [  X]  Resolved [   ]    If resolved, new PES: N/A    Goal: Pt to meet >75% EER via tolerated route    Recommendations:  1) Continue w/ current TPN order as tolerated and assess feasibility of other routes w/wound healing  2) Monitor triglycerides  adjust lipids per MD discretion  3) Please continue to take standing weight for trending purposes    Education: Understands meeting nutrient needs via TPN.    Risk Level: High [  X ] Moderate [   ] Low [   ].

## 2017-12-05 NOTE — PROGRESS NOTE ADULT - SUBJECTIVE AND OBJECTIVE BOX
O/N: prealbumin/albumin ordered for AM. vanco trough with AM labs prior to morning dose  12/4: Final phase of gallium scan performed today, awaiting read. ID recs per result of scan. Lipids given with TPN today. Abdominal wound dressing changed with wound care nurse today. O/N: prealbumin/albumin ordered for AM. vanco trough with AM labs prior to morning dose  12/4: Final phase of gallium scan performed today, awaiting read. ID recs per result of scan. Lipids given with TPN today. Abdominal wound dressing changed with wound care nurse today.       SUBJECTIVE: Patient seen and examined bedside by surgery team. Sleepy this AM. Complained of leaky, odorous output from wound vac.         enoxaparin Injectable 40 milliGRAM(s) SubCutaneous two times a day  heparin  flush 100 Units/mL Injectable 100 Unit(s) IV Push every other day  heparin  flush 100 Units/mL Injectable 100 Unit(s) IV Push every other day  losartan 50 milliGRAM(s) Oral daily  vancomycin  IVPB 1000 milliGRAM(s) IV Intermittent every 12 hours      Vital Signs Last 24 Hrs  T(C): 36.6 (05 Dec 2017 05:31), Max: 36.6 (05 Dec 2017 05:31)  T(F): 97.9 (05 Dec 2017 05:31), Max: 97.9 (05 Dec 2017 05:31)  HR: 70 (05 Dec 2017 05:31) (67 - 73)  BP: 146/79 (05 Dec 2017 05:31) (124/82 - 146/79)  BP(mean): --  RR: 17 (05 Dec 2017 05:31) (16 - 17)  SpO2: 96% (05 Dec 2017 05:31) (94% - 96%)  I&O's Detail    04 Dec 2017 07:01  -  05 Dec 2017 07:00  --------------------------------------------------------  IN:    Fat Emulsion 20%: 240 mL    TPN (Total Parenteral Nutrition): 876 mL  Total IN: 1116 mL    OUT:    Drain: 225 mL  Total OUT: 225 mL    Total NET: 891 mL            Physical Exam  General: NAD, alert, interactive, appears comfortable  C/V: NSR  Pulm: Nonlabored breathing, no respiratory distress  Abd: wound vac in place, small leaking around periphery, nontender to palpation  Extrem: WWP, no edema, SCDs in place          LABS:                        9.7    4.6   )-----------( 240      ( 05 Dec 2017 06:30 )             32.4     12-05    139  |  100  |  34<H>  ----------------------------<  128<H>  4.3   |  26  |  0.52    Ca    9.5      05 Dec 2017 06:29  Phos  3.4     12-05  Mg     2.0     12-05    TPro  6.8  /  Alb  3.1<L>  /  TBili  0.6  /  DBili  x   /  AST  44<H>  /  ALT  72<H>  /  AlkPhos  242<H>  12-05          RADIOLOGY & ADDITIONAL STUDIES:

## 2017-12-06 LAB
ANION GAP SERPL CALC-SCNC: 10 MMOL/L — SIGNIFICANT CHANGE UP (ref 5–17)
BUN SERPL-MCNC: 35 MG/DL — HIGH (ref 7–23)
CALCIUM SERPL-MCNC: 9.7 MG/DL — SIGNIFICANT CHANGE UP (ref 8.4–10.5)
CHLORIDE SERPL-SCNC: 101 MMOL/L — SIGNIFICANT CHANGE UP (ref 96–108)
CK SERPL-CCNC: 48 U/L — SIGNIFICANT CHANGE UP (ref 25–170)
CO2 SERPL-SCNC: 30 MMOL/L — SIGNIFICANT CHANGE UP (ref 22–31)
CREAT SERPL-MCNC: 0.59 MG/DL — SIGNIFICANT CHANGE UP (ref 0.5–1.3)
GLUCOSE BLDC GLUCOMTR-MCNC: 104 MG/DL — HIGH (ref 70–99)
GLUCOSE BLDC GLUCOMTR-MCNC: 120 MG/DL — HIGH (ref 70–99)
GLUCOSE BLDC GLUCOMTR-MCNC: 131 MG/DL — HIGH (ref 70–99)
GLUCOSE BLDC GLUCOMTR-MCNC: 146 MG/DL — HIGH (ref 70–99)
GLUCOSE SERPL-MCNC: 98 MG/DL — SIGNIFICANT CHANGE UP (ref 70–99)
HCT VFR BLD CALC: 33.5 % — LOW (ref 34.5–45)
HGB BLD-MCNC: 10.2 G/DL — LOW (ref 11.5–15.5)
MAGNESIUM SERPL-MCNC: 2 MG/DL — SIGNIFICANT CHANGE UP (ref 1.6–2.6)
MCHC RBC-ENTMCNC: 27 PG — SIGNIFICANT CHANGE UP (ref 27–34)
MCHC RBC-ENTMCNC: 30.4 G/DL — LOW (ref 32–36)
MCV RBC AUTO: 88.6 FL — SIGNIFICANT CHANGE UP (ref 80–100)
PHOSPHATE SERPL-MCNC: 3.4 MG/DL — SIGNIFICANT CHANGE UP (ref 2.5–4.5)
PLATELET # BLD AUTO: 229 K/UL — SIGNIFICANT CHANGE UP (ref 150–400)
POTASSIUM SERPL-MCNC: 4.1 MMOL/L — SIGNIFICANT CHANGE UP (ref 3.5–5.3)
POTASSIUM SERPL-SCNC: 4.1 MMOL/L — SIGNIFICANT CHANGE UP (ref 3.5–5.3)
RBC # BLD: 3.78 M/UL — LOW (ref 3.8–5.2)
RBC # FLD: 16.2 % — SIGNIFICANT CHANGE UP (ref 10.3–16.9)
SODIUM SERPL-SCNC: 141 MMOL/L — SIGNIFICANT CHANGE UP (ref 135–145)
WBC # BLD: 4.5 K/UL — SIGNIFICANT CHANGE UP (ref 3.8–10.5)
WBC # FLD AUTO: 4.5 K/UL — SIGNIFICANT CHANGE UP (ref 3.8–10.5)

## 2017-12-06 PROCEDURE — 99233 SBSQ HOSP IP/OBS HIGH 50: CPT

## 2017-12-06 RX ORDER — HYDROMORPHONE HYDROCHLORIDE 2 MG/ML
0.5 INJECTION INTRAMUSCULAR; INTRAVENOUS; SUBCUTANEOUS ONCE
Qty: 0 | Refills: 0 | Status: DISCONTINUED | OUTPATIENT
Start: 2017-12-06 | End: 2017-12-06

## 2017-12-06 RX ORDER — I.V. FAT EMULSION 20 G/100ML
50 EMULSION INTRAVENOUS ONCE
Qty: 0 | Refills: 0 | Status: COMPLETED | OUTPATIENT
Start: 2017-12-06 | End: 2017-12-06

## 2017-12-06 RX ORDER — ELECTROLYTE SOLUTION,INJ
1 VIAL (ML) INTRAVENOUS
Qty: 0 | Refills: 0 | Status: DISCONTINUED | OUTPATIENT
Start: 2017-12-06 | End: 2017-12-06

## 2017-12-06 RX ADMIN — GABAPENTIN 100 MILLIGRAM(S): 400 CAPSULE ORAL at 05:10

## 2017-12-06 RX ADMIN — ESCITALOPRAM OXALATE 20 MILLIGRAM(S): 10 TABLET, FILM COATED ORAL at 22:09

## 2017-12-06 RX ADMIN — CALCITRIOL 1 MICROGRAM(S): 0.5 CAPSULE ORAL at 13:14

## 2017-12-06 RX ADMIN — Medication 50 MILLIGRAM(S): at 22:09

## 2017-12-06 RX ADMIN — I.V. FAT EMULSION 20.83 GRAM(S): 20 EMULSION INTRAVENOUS at 22:11

## 2017-12-06 RX ADMIN — NYSTATIN CREAM 1 APPLICATION(S): 100000 CREAM TOPICAL at 05:11

## 2017-12-06 RX ADMIN — Medication 10 MILLIGRAM(S): at 17:27

## 2017-12-06 RX ADMIN — Medication 325 MILLIGRAM(S): at 05:08

## 2017-12-06 RX ADMIN — Medication 325 MILLIGRAM(S): at 18:27

## 2017-12-06 RX ADMIN — ONDANSETRON 4 MILLIGRAM(S): 8 TABLET, FILM COATED ORAL at 05:08

## 2017-12-06 RX ADMIN — Medication 250 MILLIGRAM(S): at 17:28

## 2017-12-06 RX ADMIN — ENOXAPARIN SODIUM 40 MILLIGRAM(S): 100 INJECTION SUBCUTANEOUS at 05:08

## 2017-12-06 RX ADMIN — ENOXAPARIN SODIUM 40 MILLIGRAM(S): 100 INJECTION SUBCUTANEOUS at 17:54

## 2017-12-06 RX ADMIN — Medication 100 UNIT(S): at 13:39

## 2017-12-06 RX ADMIN — HYDROMORPHONE HYDROCHLORIDE 0.5 MILLIGRAM(S): 2 INJECTION INTRAMUSCULAR; INTRAVENOUS; SUBCUTANEOUS at 13:43

## 2017-12-06 RX ADMIN — ONDANSETRON 4 MILLIGRAM(S): 8 TABLET, FILM COATED ORAL at 17:27

## 2017-12-06 RX ADMIN — Medication 1 APPLICATION(S): at 05:11

## 2017-12-06 RX ADMIN — OCTREOTIDE ACETATE 450 MICROGRAM(S): 200 INJECTION, SOLUTION INTRAVENOUS; SUBCUTANEOUS at 13:18

## 2017-12-06 RX ADMIN — PANTOPRAZOLE SODIUM 40 MILLIGRAM(S): 20 TABLET, DELAYED RELEASE ORAL at 06:30

## 2017-12-06 RX ADMIN — PREGABALIN 1000 MICROGRAM(S): 225 CAPSULE ORAL at 13:14

## 2017-12-06 RX ADMIN — Medication 325 MILLIGRAM(S): at 06:08

## 2017-12-06 RX ADMIN — Medication 325 MILLIGRAM(S): at 22:40

## 2017-12-06 RX ADMIN — Medication 325 MILLIGRAM(S): at 22:10

## 2017-12-06 RX ADMIN — HYDROMORPHONE HYDROCHLORIDE 0.5 MILLIGRAM(S): 2 INJECTION INTRAMUSCULAR; INTRAVENOUS; SUBCUTANEOUS at 13:58

## 2017-12-06 RX ADMIN — Medication 100 UNIT(S): at 13:14

## 2017-12-06 RX ADMIN — LOSARTAN POTASSIUM 50 MILLIGRAM(S): 100 TABLET, FILM COATED ORAL at 05:10

## 2017-12-06 RX ADMIN — Medication 10 MILLIGRAM(S): at 05:10

## 2017-12-06 RX ADMIN — GABAPENTIN 100 MILLIGRAM(S): 400 CAPSULE ORAL at 17:28

## 2017-12-06 RX ADMIN — NYSTATIN CREAM 1 APPLICATION(S): 100000 CREAM TOPICAL at 17:30

## 2017-12-06 RX ADMIN — Medication 325 MILLIGRAM(S): at 17:27

## 2017-12-06 RX ADMIN — Medication 5 MILLIGRAM(S): at 22:09

## 2017-12-06 RX ADMIN — Medication 250 MILLIGRAM(S): at 06:00

## 2017-12-06 NOTE — PROGRESS NOTE ADULT - SUBJECTIVE AND OBJECTIVE BOX
Andrzej remains stable and dressing looks excellent   looking for way to portablize the suction as the wound is improving  possibly can just place bag in a few weeks   also texted me to contact an David about social security  as soon as called number realized this was fraud and stated that cannot reveal any information and called  to tell her that the patient had given her information to scam artists that she thought were calling her to get medical information for ss benefits  he has taken necessary steps and very appreciative of us recognizing this  andrzej texted me persistantly to call these people

## 2017-12-06 NOTE — PROGRESS NOTE ADULT - ASSESSMENT
57 F s/p  SBR, fistula resection, esophagojejunostomy revision w/ post-op course complicated by RTOR for distal anastomosis breakdown, ATN, acute respiratory failure, & septic shock, MRSA bacteremia which resolved. Currently for wound care management.     CLD/TPN   Pain/nausea control - only benadryl at midnight and dilaudid 0.25mg during PT session   ISS/OOB with PT daily  Nystatin powder, vancomycin 750 Q12 (10/11-11/8) ( Daptomycin 11/22-12/1) Vanco restarted 12/1  SCDs, lovenox (therapeutic), OOBA  Lines :  L left subclavian line (10/4-12/2), L IJ (12/2--)  AM labs, weekly albumin, pre-albumin and triglyceride (every friday).  Ad daily CK level  Wet to dry dressing in place  NGT in fistula  Monitor CK daily

## 2017-12-06 NOTE — PROGRESS NOTE BEHAVIORAL HEALTH - NSBHFUPINTERVALHXFT_PSY_A_CORE
Interval events: Patient continues to be seen 2-3 times weekly by psychology intern Mark Murdock for supportive psychotherapy.  Per staff, patient attempted to call kitchen directly to order soup she was not supposed to have.  Per Dr. Brady's note, patient victim of phone scam.    Patient seen by me today at bedside.  Continues to perseverate regarding food and what she wants to eat.  Reports she was able to eat on Thanksgiving because of the holiday;  came to see her in the morning.  Involved in planning activities with family; daughter's baby shower scheduled for March.  Has made dinner reservations for [employer] in case she is discharged by then.  Hopes to be discharged before Michelle.  Agrees she called kitchen for soup but deflected any wrong-doing.  Reports participating in PT although PT was canceled yesterday as dressing leaking.  This is confirmed in flowsheets.  Patient reports resolved back pain, feels wound is improving.  Cannot remember Mr. Murdock's name; continues to refer to him as "Wilmer."

## 2017-12-06 NOTE — PROGRESS NOTE ADULT - SUBJECTIVE AND OBJECTIVE BOX
O/N: BRAYDEN, VSS  12/5:  f/u ID  recs ,  Dressing change will be done    STATUS POST:  7/24: SBR resection, fistula resection, revision of esophagogegunostomy drain through esophageal hiatus in R mediastinum  7/29: Ex-lap, SB anastamosis in BP limb breakdown with succus throughout abdomen  8/1: abd washout, drainage of abscess, biologic mesh placement, closure of abdominal wall, vac and retention suture  8/7: abd washout, mesh removal, frozen abd noted, LLQ CHECO replaced with benson drain  8/10: leak noted from previous anastamosis site on L side, mid abdomen malecot drain placed in enterotomy, JPs x 2 placed, necrotic fascia debrided, vicryl mesh sutured to fascia circumferentially, xeroform over mesh then vac dressing placed O/N: BRAYDEN, VSS  12/5:  f/u ID  recs ,  Dressing change will be done    STATUS POST:  7/24: SBR resection, fistula resection, revision of esophagogegunostomy drain through esophageal hiatus in R mediastinum  7/29: Ex-lap, SB anastamosis in BP limb breakdown with succus throughout abdomen  8/1: abd washout, drainage of abscess, biologic mesh placement, closure of abdominal wall, vac and retention suture  8/7: abd washout, mesh removal, frozen abd noted, LLQ CHECO replaced with benson drain  8/10: leak noted from previous anastamosis site on L side, mid abdomen malecot drain placed in enterotomy, JPs x 2 placed, necrotic fascia debrided, vicryl mesh sutured to fascia circumferentially, xeroform over mesh then vac dressing placed	      SUBJECTIVE: Patient examined bedside denies any complaints.  Flatus: [X] YES [] NO             Bowel Movement: [ X] YES [ ] NO  Pain (0-10):            Pain Control Adequate: [ X] YES [ ] NO  Nausea: [ ] YES [X ] NO            Vomiting: [ ] YES [X ] NO  Diarrhea: [ ] YES [ X] NO         Constipation: [ ] YES [ X] NO     Chest Pain: [ ] YES [X ] NO    SOB:  [ ] YES [X ] NO    enoxaparin Injectable 40 milliGRAM(s) SubCutaneous two times a day  heparin  flush 100 Units/mL Injectable 100 Unit(s) IV Push every other day  heparin  flush 100 Units/mL Injectable 100 Unit(s) IV Push every other day  losartan 50 milliGRAM(s) Oral daily  vancomycin  IVPB 1000 milliGRAM(s) IV Intermittent every 12 hours      Vital Signs Last 24 Hrs  T(C): 37.4 (06 Dec 2017 05:57), Max: 37.4 (06 Dec 2017 05:57)  T(F): 99.4 (06 Dec 2017 05:57), Max: 99.4 (06 Dec 2017 05:57)  HR: 63 (06 Dec 2017 05:57) (63 - 73)  BP: 129/77 (06 Dec 2017 05:57) (120/80 - 138/79)  BP(mean): --  RR: 16 (06 Dec 2017 05:57) (16 - 17)  SpO2: 95% (06 Dec 2017 05:57) (94% - 96%)  I&O's Detail    05 Dec 2017 07:01  -  06 Dec 2017 07:00  --------------------------------------------------------  IN:    Oral Fluid: 200 mL    Solution: 250 mL    TPN (Total Parenteral Nutrition): 1606 mL  Total IN: 2056 mL    OUT:    Drain: 350 mL  Total OUT: 350 mL    Total NET: 1706 mL          General: NAD, resting comfortably in bed  C/V: NSR  Pulm: Nonlabored breathing, no respiratory distress  Abd: soft, NT/ND.  Extrem: WWP, no edema, SCDs in place        LABS:                        9.7    4.6   )-----------( 240      ( 05 Dec 2017 06:30 )             32.4     12-05    139  |  100  |  34<H>  ----------------------------<  128<H>  4.3   |  26  |  0.52    Ca    9.5      05 Dec 2017 06:29  Phos  3.4     12-05  Mg     2.0     12-05    TPro  6.8  /  Alb  3.1<L>  /  TBili  0.6  /  DBili  x   /  AST  44<H>  /  ALT  72<H>  /  AlkPhos  242<H>  12-05

## 2017-12-06 NOTE — PROGRESS NOTE BEHAVIORAL HEALTH - SUMMARY
57F reported history of anxiety on Lexapro, no prior psych history, PMH gastric bypass surgery with multiple complications, currently admitted for extended period of time due to fistulas, reported SI while frustrated on 10/5 evening, has since consistently denied.  Psychiatry and psychology following 2-3 times weekly for support.  Patient somewhat regressed in hospital and with seeming mild cognitive impairments potentially contributing to intermittent acting out behaviors.

## 2017-12-07 LAB
ANION GAP SERPL CALC-SCNC: 11 MMOL/L — SIGNIFICANT CHANGE UP (ref 5–17)
BUN SERPL-MCNC: 34 MG/DL — HIGH (ref 7–23)
CALCIUM SERPL-MCNC: 9.8 MG/DL — SIGNIFICANT CHANGE UP (ref 8.4–10.5)
CHLORIDE SERPL-SCNC: 100 MMOL/L — SIGNIFICANT CHANGE UP (ref 96–108)
CK SERPL-CCNC: 30 U/L — SIGNIFICANT CHANGE UP (ref 25–170)
CO2 SERPL-SCNC: 27 MMOL/L — SIGNIFICANT CHANGE UP (ref 22–31)
CREAT SERPL-MCNC: 0.55 MG/DL — SIGNIFICANT CHANGE UP (ref 0.5–1.3)
GLUCOSE BLDC GLUCOMTR-MCNC: 106 MG/DL — HIGH (ref 70–99)
GLUCOSE BLDC GLUCOMTR-MCNC: 112 MG/DL — HIGH (ref 70–99)
GLUCOSE BLDC GLUCOMTR-MCNC: 150 MG/DL — HIGH (ref 70–99)
GLUCOSE SERPL-MCNC: 109 MG/DL — HIGH (ref 70–99)
MAGNESIUM SERPL-MCNC: 2.1 MG/DL — SIGNIFICANT CHANGE UP (ref 1.6–2.6)
PHOSPHATE SERPL-MCNC: 3.5 MG/DL — SIGNIFICANT CHANGE UP (ref 2.5–4.5)
POTASSIUM SERPL-MCNC: 4.2 MMOL/L — SIGNIFICANT CHANGE UP (ref 3.5–5.3)
POTASSIUM SERPL-SCNC: 4.2 MMOL/L — SIGNIFICANT CHANGE UP (ref 3.5–5.3)
SODIUM SERPL-SCNC: 138 MMOL/L — SIGNIFICANT CHANGE UP (ref 135–145)
VANCOMYCIN TROUGH SERPL-MCNC: 18.9 UG/ML — SIGNIFICANT CHANGE UP (ref 10–20)

## 2017-12-07 PROCEDURE — 99232 SBSQ HOSP IP/OBS MODERATE 35: CPT

## 2017-12-07 RX ORDER — ELECTROLYTE SOLUTION,INJ
1 VIAL (ML) INTRAVENOUS
Qty: 0 | Refills: 0 | Status: DISCONTINUED | OUTPATIENT
Start: 2017-12-07 | End: 2017-12-07

## 2017-12-07 RX ADMIN — ONDANSETRON 4 MILLIGRAM(S): 8 TABLET, FILM COATED ORAL at 21:36

## 2017-12-07 RX ADMIN — ONDANSETRON 4 MILLIGRAM(S): 8 TABLET, FILM COATED ORAL at 15:36

## 2017-12-07 RX ADMIN — Medication 1 APPLICATION(S): at 06:53

## 2017-12-07 RX ADMIN — Medication 50 MILLIGRAM(S): at 21:34

## 2017-12-07 RX ADMIN — OCTREOTIDE ACETATE 450 MICROGRAM(S): 200 INJECTION, SOLUTION INTRAVENOUS; SUBCUTANEOUS at 13:31

## 2017-12-07 RX ADMIN — GABAPENTIN 100 MILLIGRAM(S): 400 CAPSULE ORAL at 06:52

## 2017-12-07 RX ADMIN — LOSARTAN POTASSIUM 50 MILLIGRAM(S): 100 TABLET, FILM COATED ORAL at 06:52

## 2017-12-07 RX ADMIN — Medication 10 MILLIGRAM(S): at 18:44

## 2017-12-07 RX ADMIN — ENOXAPARIN SODIUM 40 MILLIGRAM(S): 100 INJECTION SUBCUTANEOUS at 06:52

## 2017-12-07 RX ADMIN — Medication 5 MILLIGRAM(S): at 21:34

## 2017-12-07 RX ADMIN — Medication 325 MILLIGRAM(S): at 15:37

## 2017-12-07 RX ADMIN — Medication 10 MILLIGRAM(S): at 06:52

## 2017-12-07 RX ADMIN — Medication 325 MILLIGRAM(S): at 22:05

## 2017-12-07 RX ADMIN — ESCITALOPRAM OXALATE 20 MILLIGRAM(S): 10 TABLET, FILM COATED ORAL at 21:34

## 2017-12-07 RX ADMIN — PANTOPRAZOLE SODIUM 40 MILLIGRAM(S): 20 TABLET, DELAYED RELEASE ORAL at 06:51

## 2017-12-07 RX ADMIN — ENOXAPARIN SODIUM 40 MILLIGRAM(S): 100 INJECTION SUBCUTANEOUS at 18:44

## 2017-12-07 RX ADMIN — Medication 325 MILLIGRAM(S): at 10:59

## 2017-12-07 RX ADMIN — Medication 325 MILLIGRAM(S): at 11:13

## 2017-12-07 RX ADMIN — Medication 250 MILLIGRAM(S): at 08:38

## 2017-12-07 RX ADMIN — GABAPENTIN 100 MILLIGRAM(S): 400 CAPSULE ORAL at 18:43

## 2017-12-07 RX ADMIN — NYSTATIN CREAM 1 APPLICATION(S): 100000 CREAM TOPICAL at 06:53

## 2017-12-07 RX ADMIN — NYSTATIN CREAM 1 APPLICATION(S): 100000 CREAM TOPICAL at 18:45

## 2017-12-07 RX ADMIN — Medication 250 MILLIGRAM(S): at 18:53

## 2017-12-07 RX ADMIN — Medication 325 MILLIGRAM(S): at 21:35

## 2017-12-07 RX ADMIN — Medication 325 MILLIGRAM(S): at 18:12

## 2017-12-07 RX ADMIN — ONDANSETRON 4 MILLIGRAM(S): 8 TABLET, FILM COATED ORAL at 06:52

## 2017-12-07 NOTE — PROGRESS NOTE ADULT - ASSESSMENT
IMPRESSION:  MRSA bacteremia - patient stable from ID standpoint.  Trough is appropriate    Recommend:  1.  Continue vancomycin 1 gram IV q12 until 12/26/2017.    2.  Can recheck 30 min before AM dose on 12/11/17

## 2017-12-07 NOTE — PROGRESS NOTE ADULT - SUBJECTIVE AND OBJECTIVE BOX
On interval followup, the left int jugular catheter site is clean, dry, non-inflamed and non-tender. T 99 range. Notes and recent blood cultures are followed.

## 2017-12-07 NOTE — PROGRESS NOTE ADULT - SUBJECTIVE AND OBJECTIVE BOX
O/N: BRAYDEN, VSS  12/6: dressing changed w/ modification    STATUS POST:  7/24: SBR resection, fistula resection, revision of esophagogegunostomy drain through esophageal hiatus in R mediastinum  7/29: Ex-lap, SB anastamosis in BP limb breakdown with succus throughout abdomen  8/1: abd washout, drainage of abscess, biologic mesh placement, closure of abdominal wall, vac and retention suture  8/7: abd washout, mesh removal, frozen abd noted, LLQ CHECO replaced with benson drain  8/10: leak noted from previous anastamosis site on L side, mid abdomen malecot drain placed in enterotomy, JPs x 2 placed, necrotic fascia debrided, vicryl mesh sutured to fascia circumferentially, xeroform over mesh then vac dressing placed O/N: BRAYDEN, VSS  12/6: dressing changed w/ modification    STATUS POST:  7/24: SBR resection, fistula resection, revision of esophagogegunostomy drain through esophageal hiatus in R mediastinum  7/29: Ex-lap, SB anastamosis in BP limb breakdown with succus throughout abdomen  8/1: abd washout, drainage of abscess, biologic mesh placement, closure of abdominal wall, vac and retention suture  8/7: abd washout, mesh removal, frozen abd noted, LLQ CHECO replaced with benson drain  8/10: leak noted from previous anastamosis site on L side, mid abdomen malecot drain placed in enterotomy, JPs x 2 placed, necrotic fascia debrided, vicryl mesh sutured to fascia circumferentially, xeroform over mesh then vac dressing placed	        SUBJECTIVE: Patient denies any complaints.  Flatus: [x] YES [] NO             Bowel Movement: [x ] YES [ ] NO  Pain (0-10):            Pain Control Adequate: [ x] YES [ ] NO  Nausea: [ ] YES [x ] NO            Vomiting: [ ] YES [x ] NO  Diarrhea: [ ] YES [x ] NO         Constipation: [ ] YES [x ] NO     Chest Pain: [ ] YES [x ] NO    SOB:  [ ] YES [ x] NO    enoxaparin Injectable 40 milliGRAM(s) SubCutaneous two times a day  heparin  flush 100 Units/mL Injectable 100 Unit(s) IV Push every other day  heparin  flush 100 Units/mL Injectable 100 Unit(s) IV Push every other day  losartan 50 milliGRAM(s) Oral daily  vancomycin  IVPB 1000 milliGRAM(s) IV Intermittent every 12 hours      Vital Signs Last 24 Hrs  T(C): 37 (07 Dec 2017 05:50), Max: 37.6 (06 Dec 2017 20:35)  T(F): 98.6 (07 Dec 2017 05:50), Max: 99.6 (06 Dec 2017 20:35)  HR: 71 (07 Dec 2017 05:50) (65 - 71)  BP: 146/92 (07 Dec 2017 05:50) (130/70 - 148/90)  BP(mean): --  RR: 16 (07 Dec 2017 05:50) (16 - 17)  SpO2: 94% (07 Dec 2017 05:50) (94% - 97%)  I&O's Detail    06 Dec 2017 07:01  -  07 Dec 2017 07:00  --------------------------------------------------------  IN:    Oral Fluid: 120 mL    TPN (Total Parenteral Nutrition): 876 mL  Total IN: 996 mL    OUT:    Drain: 100 mL  Total OUT: 100 mL    Total NET: 896 mL          General: NAD, resting comfortably in bed  C/V: NSR  Pulm: Nonlabored breathing, no respiratory distress  Abd: soft, NT/ND. Dressing  clean and dry. Wound has healthy granulation tissue observed  Extrem: WWP, no edema, SCDs in place      LABS:                        10.2   4.5   )-----------( 229      ( 06 Dec 2017 07:48 )             33.5     12-06    141  |  101  |  35<H>  ----------------------------<  98  4.1   |  30  |  0.59    Ca    9.7      06 Dec 2017 07:48  Phos  3.4     12-06  Mg     2.0     12-06

## 2017-12-07 NOTE — PROGRESS NOTE ADULT - SUBJECTIVE AND OBJECTIVE BOX
INTERVAL HPI/OVERNIGHT EVENTS:    Patient was seen and examined.  No complaints.  Tolerating vancomycin well    CONSTITUTIONAL:  Negative fever or chills, feels well, good appetite  EYES:  Negative  blurry vision or double vision  CARDIOVASCULAR:  Negative for chest pain or palpitations  RESPIRATORY:  Negative for cough, wheezing, or SOB   GASTROINTESTINAL:  Negative for nausea, vomiting, diarrhea, constipation, or abdominal pain  GENITOURINARY:  Negative frequency, urgency or dysuria  NEUROLOGIC:  No headache, confusion, dizziness, lightheadedness      ANTIBIOTICS/RELEVANT:    MEDICATIONS  (STANDING):  bismuth subsalicylate Liquid 30 milliLiter(s) Oral daily  calcitriol Injectable 1 MICROGram(s) IV Push <User Schedule>  collagenase Ointment 1 Application(s) Topical daily  cyanocobalamin Injectable 1000 MICROGram(s) SubCutaneous every 7 days  dextrose 5%. 1000 milliLiter(s) (50 mL/Hr) IV Continuous <Continuous>  dextrose 50% Injectable 25 Gram(s) IV Push once  dextrose 50% Injectable 12.5 Gram(s) IV Push once  diazepam    Tablet 5 milliGRAM(s) Oral at bedtime  diphenhydrAMINE   Injectable 50 milliGRAM(s) IV Push at bedtime  enoxaparin Injectable 40 milliGRAM(s) SubCutaneous two times a day  escitalopram 20 milliGRAM(s) Oral daily  gabapentin 100 milliGRAM(s) Oral two times a day  heparin  flush 100 Units/mL Injectable 100 Unit(s) IV Push every other day  heparin  flush 100 Units/mL Injectable 100 Unit(s) IV Push every other day  insulin lispro (HumaLOG) corrective regimen sliding scale   SubCutaneous every 6 hours  lidocaine   Patch 1 Patch Transdermal daily  losartan 50 milliGRAM(s) Oral daily  nystatin Powder 1 Application(s) Topical two times a day  octreotide  Injectable 450 MICROGram(s) IV Push daily  oxybutynin 10 milliGRAM(s) Oral two times a day  pantoprazole  Injectable 40 milliGRAM(s) IV Push daily  Parenteral Nutrition - Adult 1 Each (73 mL/Hr) TPN Continuous <Continuous>  Parenteral Nutrition - Adult 1 Each (73 mL/Hr) TPN Continuous <Continuous>  sodium chloride 0.9% lock flush 20 milliLiter(s) IV Push once  vancomycin  IVPB 1000 milliGRAM(s) IV Intermittent every 12 hours    MEDICATIONS  (PRN):  acetaminophen   Tablet. 325 milliGRAM(s) Oral every 4 hours PRN Moderate Pain (4 - 6)  ondansetron Injectable 4 milliGRAM(s) IV Push every 6 hours PRN Nausea and/or Vomiting  sodium chloride 0.9% lock flush 10 milliLiter(s) IV Push every 1 hour PRN After each medication administration  sodium chloride 0.9% lock flush 10 milliLiter(s) IV Push every 12 hours PRN Lumen of catheter NOT used        Vital Signs Last 24 Hrs  T(C): 37 (07 Dec 2017 05:50), Max: 37.6 (06 Dec 2017 20:35)  T(F): 98.6 (07 Dec 2017 05:50), Max: 99.6 (06 Dec 2017 20:35)  HR: 71 (07 Dec 2017 05:50) (65 - 71)  BP: 146/92 (07 Dec 2017 05:50) (138/83 - 148/90)  BP(mean): --  RR: 16 (07 Dec 2017 05:50) (16 - 17)  SpO2: 94% (07 Dec 2017 05:50) (94% - 97%)    PHYSICAL EXAM:  Constitutional:  non-toxic, no distress  Eyes:CL, EOMI  Ear/Nose/Throat: no oral lesion, no sinus tenderness on percussion	  Neck:no JVD, no lymphadenopathy, supple  Respiratory: clear  Cardiovascular: S1S2 RRR, no murmurs  Gastrointestinal:soft, (+) BS, no HSM, large abdominal wound with colostomy in place  Extremities:  + edema  Vascular: DP Pulse:	right normal; left normal      LABS:                        10.2   4.5   )-----------( 229      ( 06 Dec 2017 07:48 )             33.5     12-07    138  |  100  |  34<H>  ----------------------------<  109<H>  4.2   |  27  |  0.55    Ca    9.8      07 Dec 2017 08:07  Phos  3.5     12-07  Mg     2.1     12-07        vanco trough:  18.6    MICROBIOLOGY:    RADIOLOGY & ADDITIONAL STUDIES:

## 2017-12-07 NOTE — CHART NOTE - NSCHARTNOTEFT_GEN_A_CORE
Admitting Diagnosis:   Patient is a 57y old  Female who presents with a chief complaint of enterocutaneous fistula (24 Jul 2017 14:15)      PAST MEDICAL & SURGICAL HISTORY:  Reflux  Obesity  DVT (deep venous thrombosis): 2013 neck  Diverticulitis  Hypertension  Elective surgery: abodominal wall surgery  dec 2016  Elective surgery: NOV 2016 gastric bypass revision  Gastric bypass status for obesity: gastric sleeve, 6/2012  S/P breast biopsy: 2011, left  S/P colon resection: 2011  S/P knee surgery: repair 2009, 2011  right; left  Umbilical hernia: 2000  S/P appendectomy: 1975  S/P tonsillectomy: 1967    Current Nutrition Order:  Clear Liquid Diet (w/ exceptions)  >> Per discussion w/ team, pt is allowed to have 1 flavored Icee at breakfast and dinner and a soup broth at lunch. Chart outside pt's door to ensure 1 meal is delivered at each meal by RN or Dietary. Increased PO intake compromises the integrity of resolving fistulas.    >> TPN via central venous line:  1752 TV (73ml/hr); 355g D, 140g AA, 50g 20% lipids (1767kcal no lipid calories) and 50g 20% lipids; total kcal w/ lipids: (2267 kcal, 2.7g/kg IBW pro, GIR 3.30)    PO Intake: Good (%) [   ]  Fair (50-75%) [   ] Poor (<25%) [ x  ] 1 Clear liquid option at each meal    GI Issues: No reported GI distress at this time.    Pain: No C/o pain.    Skin Integrity: sacrum un-stageable PU; sacral abrasion; abd wound--> NGtube placed w/ sponge through pouch into fistula for suction      Labs:   12-07    138  |  100  |  34<H>  ----------------------------<  109<H>  4.2   |  27  |  0.55    Ca    9.8      07 Dec 2017 08:07  Phos  3.5     12-07  Mg     2.1     12-07      CAPILLARY BLOOD GLUCOSE      POCT Blood Glucose.: 112 mg/dL (07 Dec 2017 05:44)  POCT Blood Glucose.: 131 mg/dL (06 Dec 2017 23:51)  POCT Blood Glucose.: 120 mg/dL (06 Dec 2017 17:14)  POCT Blood Glucose.: 104 mg/dL (06 Dec 2017 14:38)      Medications:  MEDICATIONS  (STANDING):  bismuth subsalicylate Liquid 30 milliLiter(s) Oral daily  calcitriol Injectable 1 MICROGram(s) IV Push <User Schedule>  collagenase Ointment 1 Application(s) Topical daily  cyanocobalamin Injectable 1000 MICROGram(s) SubCutaneous every 7 days  dextrose 5%. 1000 milliLiter(s) (50 mL/Hr) IV Continuous <Continuous>  dextrose 50% Injectable 25 Gram(s) IV Push once  dextrose 50% Injectable 12.5 Gram(s) IV Push once  diazepam    Tablet 5 milliGRAM(s) Oral at bedtime  diphenhydrAMINE   Injectable 50 milliGRAM(s) IV Push at bedtime  enoxaparin Injectable 40 milliGRAM(s) SubCutaneous two times a day  escitalopram 20 milliGRAM(s) Oral daily  gabapentin 100 milliGRAM(s) Oral two times a day  heparin  flush 100 Units/mL Injectable 100 Unit(s) IV Push every other day  heparin  flush 100 Units/mL Injectable 100 Unit(s) IV Push every other day  insulin lispro (HumaLOG) corrective regimen sliding scale   SubCutaneous every 6 hours  lidocaine   Patch 1 Patch Transdermal daily  losartan 50 milliGRAM(s) Oral daily  nystatin Powder 1 Application(s) Topical two times a day  octreotide  Injectable 450 MICROGram(s) IV Push daily  oxybutynin 10 milliGRAM(s) Oral two times a day  pantoprazole  Injectable 40 milliGRAM(s) IV Push daily  Parenteral Nutrition - Adult 1 Each (73 mL/Hr) TPN Continuous <Continuous>  Parenteral Nutrition - Adult 1 Each (73 mL/Hr) TPN Continuous <Continuous>  sodium chloride 0.9% lock flush 20 milliLiter(s) IV Push once  vancomycin  IVPB 1000 milliGRAM(s) IV Intermittent every 12 hours    MEDICATIONS  (PRN):  acetaminophen   Tablet. 325 milliGRAM(s) Oral every 4 hours PRN Moderate Pain (4 - 6)  ondansetron Injectable 4 milliGRAM(s) IV Push every 6 hours PRN Nausea and/or Vomiting  sodium chloride 0.9% lock flush 10 milliLiter(s) IV Push every 1 hour PRN After each medication administration  sodium chloride 0.9% lock flush 10 milliLiter(s) IV Push every 12 hours PRN Lumen of catheter NOT used      Weight:  Bed Scale:  154.9lb (12/7)  155.9lb (12/5)  162.0lb (11/30)  161.5lb (11/13)  160.5lb (11/8)   Standing Scale:  167lb (11/13)  164lb (10/17)  219lb (8/1)    Weight Change:   03/2016: 89kg  02/2017: 74kg   07/2017: 76kg  11/2017: 76kg     >>Bed scale wt indicates wts are trending down. Spoke w/ RN about obtaining standing wt for accuracy. Likely most feasible when pt is doing PT. Will follow and adjust nutrient intake as needed.     Estimated energy needs:   Estimated energy needs using 52 kg IBW:  increased needs per wound and pressure ulcer healing. needs calculated based on TPN as primary route of nutrition.  30-35 kcal/kg (2549-6849 kcal).   1.8-2 g/kg ( g protein).  30-35 mL/kg (6201-3683 mL fluid).     Subjective:   Pt now ordered for clear liquid diet. Per team- pt is allowed to have one clear liquid item at each meal. pt is having an flavored water ice for breakfast and dinner and a broth for lunch.   TPN bag infusing at 73ml/hr providing 355g Dex, 140g AA, 50g 20% lipids (2267 kcal, 2.7g/kg IBW pro, GIR 3.30).   Please update weights weekly to trend adequate of nutrient intake via TPN. Bed scale wt has been trending down ~5lbs. Please use standing scale for accuracy.     Previous Nutrition Diagnosis: Increased nutrient needs RT increased demand for nutrients AEB wound and pressure ulcer healing   Active [  X]  Resolved [   ]    If resolved, new PES: N/A    Goal: Pt to meet >75% EER via tolerated route    Recommendations:  1) Continue w/ current TPN order as tolerated and assess feasibility of other routes w/wound healing  2) Monitor triglycerides & adjust lipids per MD discretion  3) Please continue to take standing weight for trending purposes  4) Recommend MVI, OsCal, B12    Education: Understands meeting nutrient needs via TPN.    Risk Level: High [  X ] Moderate [   ] Low [   ]. Admitting Diagnosis:   Patient is a 57y old  Female who presents with a chief complaint of enterocutaneous fistula (24 Jul 2017 14:15)      PAST MEDICAL & SURGICAL HISTORY:  Reflux  Obesity  DVT (deep venous thrombosis): 2013 neck  Diverticulitis  Hypertension  Elective surgery: abodominal wall surgery  dec 2016  Elective surgery: NOV 2016 gastric bypass revision  Gastric bypass status for obesity: gastric sleeve, 6/2012  S/P breast biopsy: 2011, left  S/P colon resection: 2011  S/P knee surgery: repair 2009, 2011  right; left  Umbilical hernia: 2000  S/P appendectomy: 1975  S/P tonsillectomy: 1967    Current Nutrition Order:  Clear Liquid Diet (w/ exceptions)  >> Per discussion w/ team, pt is allowed to have 1 flavored Icee at breakfast and dinner and a soup broth at lunch. Chart outside pt's door to ensure 1 meal is delivered at each meal by RN or Dietary. Increased PO intake compromises the integrity of resolving fistulas.    >> TPN via central venous line:  1752 TV (73ml/hr); 355g D, 140g AA, 50g 20% lipids (1767kcal no lipid calories) and 50g 20% lipids; total kcal w/ lipids: (2267 kcal, 2.7g/kg IBW pro, GIR 3.30)    PO Intake: Good (%) [   ]  Fair (50-75%) [   ] Poor (<25%) [ x  ] 1 Clear liquid option at each meal    GI Issues: No reported GI distress at this time.    Pain: No C/o pain.    Skin Integrity: sacrum un-stageable PU; sacral abrasion; abd wound--> NGtube placed w/ sponge through pouch into fistula for suction      Labs:   12-07    138  |  100  |  34<H>  ----------------------------<  109<H>  4.2   |  27  |  0.55    Ca    9.8      07 Dec 2017 08:07  Phos  3.5     12-07  Mg     2.1     12-07      CAPILLARY BLOOD GLUCOSE      POCT Blood Glucose.: 112 mg/dL (07 Dec 2017 05:44)  POCT Blood Glucose.: 131 mg/dL (06 Dec 2017 23:51)  POCT Blood Glucose.: 120 mg/dL (06 Dec 2017 17:14)  POCT Blood Glucose.: 104 mg/dL (06 Dec 2017 14:38)      Medications:  MEDICATIONS  (STANDING):  bismuth subsalicylate Liquid 30 milliLiter(s) Oral daily  calcitriol Injectable 1 MICROGram(s) IV Push <User Schedule>  collagenase Ointment 1 Application(s) Topical daily  cyanocobalamin Injectable 1000 MICROGram(s) SubCutaneous every 7 days  dextrose 5%. 1000 milliLiter(s) (50 mL/Hr) IV Continuous <Continuous>  dextrose 50% Injectable 25 Gram(s) IV Push once  dextrose 50% Injectable 12.5 Gram(s) IV Push once  diazepam    Tablet 5 milliGRAM(s) Oral at bedtime  diphenhydrAMINE   Injectable 50 milliGRAM(s) IV Push at bedtime  enoxaparin Injectable 40 milliGRAM(s) SubCutaneous two times a day  escitalopram 20 milliGRAM(s) Oral daily  gabapentin 100 milliGRAM(s) Oral two times a day  heparin  flush 100 Units/mL Injectable 100 Unit(s) IV Push every other day  heparin  flush 100 Units/mL Injectable 100 Unit(s) IV Push every other day  insulin lispro (HumaLOG) corrective regimen sliding scale   SubCutaneous every 6 hours  lidocaine   Patch 1 Patch Transdermal daily  losartan 50 milliGRAM(s) Oral daily  nystatin Powder 1 Application(s) Topical two times a day  octreotide  Injectable 450 MICROGram(s) IV Push daily  oxybutynin 10 milliGRAM(s) Oral two times a day  pantoprazole  Injectable 40 milliGRAM(s) IV Push daily  Parenteral Nutrition - Adult 1 Each (73 mL/Hr) TPN Continuous <Continuous>  Parenteral Nutrition - Adult 1 Each (73 mL/Hr) TPN Continuous <Continuous>  sodium chloride 0.9% lock flush 20 milliLiter(s) IV Push once  vancomycin  IVPB 1000 milliGRAM(s) IV Intermittent every 12 hours    MEDICATIONS  (PRN):  acetaminophen   Tablet. 325 milliGRAM(s) Oral every 4 hours PRN Moderate Pain (4 - 6)  ondansetron Injectable 4 milliGRAM(s) IV Push every 6 hours PRN Nausea and/or Vomiting  sodium chloride 0.9% lock flush 10 milliLiter(s) IV Push every 1 hour PRN After each medication administration  sodium chloride 0.9% lock flush 10 milliLiter(s) IV Push every 12 hours PRN Lumen of catheter NOT used      Weight:  Bed Scale:  154.9lb (12/7)  155.9lb (12/5)  162.0lb (11/30)  161.5lb (11/13)  160.5lb (11/8)   Standing Scale:  167lb (11/13)  164lb (10/17)  219lb (8/1)    Weight Change:   03/2016: 89kg  02/2017: 74kg   07/2017: 76kg  11/2017: 76kg     >>Bed scale wt indicates wts are trending down. Spoke w/ RN about obtaining standing wt for accuracy. Likely most feasible when pt is doing PT. Will follow and adjust nutrient intake as needed.     Estimated energy needs:   Estimated energy needs using 52 kg IBW:  increased needs per wound and pressure ulcer healing. needs calculated based on TPN as primary route of nutrition.  30-35 kcal/kg (8317-4912 kcal).   1.8-2 g/kg ( g protein).  30-35 mL/kg (2769-5442 mL fluid).     Subjective:   Diet regimen remains the same. Per team- pt is allowed to have one clear liquid item at each meal. pt is having an flavored water ice for breakfast and dinner and a broth for lunch. There is now a chart outside of room to ensure 1 clear liquid option is provided at each meal.   TPN bag infusing at 73ml/hr providing 355g Dex, 140g AA, 50g 20% lipids (2267 kcal, 2.7g/kg IBW pro, GIR 3.30).   Please update weights weekly to trend adequate of nutrient intake via TPN. Bed scale wt has been trending down ~5lbs. Please use standing scale for accuracy.     Previous Nutrition Diagnosis: Increased nutrient needs RT increased demand for nutrients AEB wound and pressure ulcer healing   Active [  X]  Resolved [   ]    If resolved, new PES: N/A    Goal: Pt to meet >75% EER via tolerated route    Recommendations:  1) Continue w/ current TPN order as tolerated and assess feasibility of other routes w/wound healing  2) Monitor triglycerides & adjust lipids per MD discretion  3) Please continue to take standing weight for trending purposes  4) Recommend MVI, OsCal, B12    Education: Understands meeting nutrient needs via TPN.    Risk Level: High [  X ] Moderate [   ] Low [   ].

## 2017-12-08 LAB
ANION GAP SERPL CALC-SCNC: 9 MMOL/L — SIGNIFICANT CHANGE UP (ref 5–17)
BUN SERPL-MCNC: 36 MG/DL — HIGH (ref 7–23)
CALCIUM SERPL-MCNC: 9.9 MG/DL — SIGNIFICANT CHANGE UP (ref 8.4–10.5)
CHLORIDE SERPL-SCNC: 101 MMOL/L — SIGNIFICANT CHANGE UP (ref 96–108)
CO2 SERPL-SCNC: 30 MMOL/L — SIGNIFICANT CHANGE UP (ref 22–31)
CREAT SERPL-MCNC: 0.64 MG/DL — SIGNIFICANT CHANGE UP (ref 0.5–1.3)
GLUCOSE BLDC GLUCOMTR-MCNC: 118 MG/DL — HIGH (ref 70–99)
GLUCOSE BLDC GLUCOMTR-MCNC: 153 MG/DL — HIGH (ref 70–99)
GLUCOSE BLDC GLUCOMTR-MCNC: 70 MG/DL — SIGNIFICANT CHANGE UP (ref 70–99)
GLUCOSE BLDC GLUCOMTR-MCNC: 98 MG/DL — SIGNIFICANT CHANGE UP (ref 70–99)
GLUCOSE SERPL-MCNC: 105 MG/DL — HIGH (ref 70–99)
MAGNESIUM SERPL-MCNC: 2.2 MG/DL — SIGNIFICANT CHANGE UP (ref 1.6–2.6)
PHOSPHATE SERPL-MCNC: 3.7 MG/DL — SIGNIFICANT CHANGE UP (ref 2.5–4.5)
POTASSIUM SERPL-MCNC: 4.2 MMOL/L — SIGNIFICANT CHANGE UP (ref 3.5–5.3)
POTASSIUM SERPL-SCNC: 4.2 MMOL/L — SIGNIFICANT CHANGE UP (ref 3.5–5.3)
SODIUM SERPL-SCNC: 140 MMOL/L — SIGNIFICANT CHANGE UP (ref 135–145)

## 2017-12-08 RX ORDER — DIAZEPAM 5 MG
5 TABLET ORAL AT BEDTIME
Qty: 0 | Refills: 0 | Status: DISCONTINUED | OUTPATIENT
Start: 2017-12-08 | End: 2017-12-14

## 2017-12-08 RX ORDER — DIPHENHYDRAMINE HCL 50 MG
50 CAPSULE ORAL EVERY 6 HOURS
Qty: 0 | Refills: 0 | Status: DISCONTINUED | OUTPATIENT
Start: 2017-12-08 | End: 2017-12-29

## 2017-12-08 RX ORDER — I.V. FAT EMULSION 20 G/100ML
50 EMULSION INTRAVENOUS ONCE
Qty: 0 | Refills: 0 | Status: COMPLETED | OUTPATIENT
Start: 2017-12-08 | End: 2017-12-08

## 2017-12-08 RX ORDER — ELECTROLYTE SOLUTION,INJ
1 VIAL (ML) INTRAVENOUS
Qty: 0 | Refills: 0 | Status: DISCONTINUED | OUTPATIENT
Start: 2017-12-08 | End: 2017-12-08

## 2017-12-08 RX ADMIN — PANTOPRAZOLE SODIUM 40 MILLIGRAM(S): 20 TABLET, DELAYED RELEASE ORAL at 06:34

## 2017-12-08 RX ADMIN — Medication 1 APPLICATION(S): at 06:45

## 2017-12-08 RX ADMIN — Medication 10 MILLIGRAM(S): at 06:35

## 2017-12-08 RX ADMIN — ESCITALOPRAM OXALATE 20 MILLIGRAM(S): 10 TABLET, FILM COATED ORAL at 22:14

## 2017-12-08 RX ADMIN — ONDANSETRON 4 MILLIGRAM(S): 8 TABLET, FILM COATED ORAL at 18:59

## 2017-12-08 RX ADMIN — Medication 250 MILLIGRAM(S): at 22:15

## 2017-12-08 RX ADMIN — Medication 100 UNIT(S): at 18:36

## 2017-12-08 RX ADMIN — LIDOCAINE 1 PATCH: 4 CREAM TOPICAL at 18:44

## 2017-12-08 RX ADMIN — Medication 325 MILLIGRAM(S): at 18:59

## 2017-12-08 RX ADMIN — NYSTATIN CREAM 1 APPLICATION(S): 100000 CREAM TOPICAL at 18:48

## 2017-12-08 RX ADMIN — Medication 10 MILLIGRAM(S): at 18:45

## 2017-12-08 RX ADMIN — GABAPENTIN 100 MILLIGRAM(S): 400 CAPSULE ORAL at 18:44

## 2017-12-08 RX ADMIN — I.V. FAT EMULSION 20.83 GRAM(S): 20 EMULSION INTRAVENOUS at 22:14

## 2017-12-08 RX ADMIN — ENOXAPARIN SODIUM 40 MILLIGRAM(S): 100 INJECTION SUBCUTANEOUS at 06:34

## 2017-12-08 RX ADMIN — Medication 5 MILLIGRAM(S): at 22:14

## 2017-12-08 RX ADMIN — GABAPENTIN 100 MILLIGRAM(S): 400 CAPSULE ORAL at 06:35

## 2017-12-08 RX ADMIN — NYSTATIN CREAM 1 APPLICATION(S): 100000 CREAM TOPICAL at 06:44

## 2017-12-08 RX ADMIN — ENOXAPARIN SODIUM 40 MILLIGRAM(S): 100 INJECTION SUBCUTANEOUS at 18:44

## 2017-12-08 RX ADMIN — Medication 50 MILLIGRAM(S): at 11:00

## 2017-12-08 RX ADMIN — Medication 50 MILLIGRAM(S): at 16:41

## 2017-12-08 RX ADMIN — Medication 250 MILLIGRAM(S): at 08:28

## 2017-12-08 RX ADMIN — Medication 50 MILLIGRAM(S): at 22:14

## 2017-12-08 RX ADMIN — OCTREOTIDE ACETATE 450 MICROGRAM(S): 200 INJECTION, SOLUTION INTRAVENOUS; SUBCUTANEOUS at 12:52

## 2017-12-08 RX ADMIN — LOSARTAN POTASSIUM 50 MILLIGRAM(S): 100 TABLET, FILM COATED ORAL at 06:35

## 2017-12-08 NOTE — PROGRESS NOTE ADULT - ASSESSMENT
57 F s/p  SBR, fistula resection, esophagojejunostomy revision w/ post-op course complicated by RTOR for distal anastomosis breakdown, ATN, acute respiratory failure, & septic shock, MRSA bacteremia which resolved. Currently for wound care management.     CLD/TPN   Pain/nausea control - only benadryl at midnight and dilaudid 0.25mg during PT session   ISS/OOB with PT daily  Nystatin powder, vancomycin 750 Q12 (10/11-11/8) ( Daptomycin 11/22-12/1) Vanco restarted 12/1  SCDs, lovenox (therapeutic), OOBA  Lines :  L left subclavian line (10/4-12/2), L IJ (12/2--)  AM labs, weekly albumin, pre-albumin and triglyceride (every friday).  Ad daily CK level  Wet to dry dressing in place  NGT in fistula  Monitor CK daily 57 F s/p  SBR, fistula resection, esophagojejunostomy revision w/ post-op course complicated by RTOR for distal anastomosis breakdown, ATN, acute respiratory failure, & septic shock, MRSA bacteremia which resolved. Currently for wound care management.     - added Bendadryl for pruritus  CLD/TPN   Pain/nausea control - only benadryl at midnight and dilaudid 0.25mg during PT session   ISS/OOB with PT daily  Nystatin powder, vancomycin 750 Q12 (10/11-11/8) ( Daptomycin 11/22-12/1) Vanco restarted 12/1  SCDs, lovenox (therapeutic), OOBA  Lines :  L left subclavian line (10/4-12/2), L IJ (12/2--)  AM labs, weekly albumin, pre-albumin and triglyceride (every friday).  Ad daily CK level  Wet to dry dressing in place  NGT in fistula

## 2017-12-08 NOTE — PROGRESS NOTE ADULT - SUBJECTIVE AND OBJECTIVE BOX
O/N: BRAYDEN, VSS  12/7: Vanco trough 18.9; CK 30;     STATUS POST:   7/24: SBR resection, fistula resection, revision of esophagogegunostomy drain through esophageal hiatus in R mediastinum  7/29: Ex-lap, SB anastamosis in BP limb breakdown with succus throughout abdomen  8/1: abd washout, drainage of abscess, biologic mesh placement, closure of abdominal wall, vac and retention suture  8/7: abd washout, mesh removal, frozen abd noted, LLQ CHECO replaced with benson drain  8/10: leak noted from previous anastamosis site on L side, mid abdomen malecot drain placed in enterotomy, JPs x 2 placed, necrotic fascia debrided, vicryl mesh sutured to fascia circumferentially, xeroform over mesh then vac dressing placed O/N: ARMANDO OWEN  12/7: Vanco trough 18.9; CK 30;       SUBJECTIVE: Patient seen and examined bedside by surgery team. Patient awake this morning and was being cleaned by nurses. No complaints other than diffuse abdominal pruritus and leaky abdominal dressing. Pain well-controlled. Denies nausea, vomiting, chest pain, shortness of breath, fevers, or chills.           enoxaparin Injectable 40 milliGRAM(s) SubCutaneous two times a day  heparin  flush 100 Units/mL Injectable 100 Unit(s) IV Push every other day  heparin  flush 100 Units/mL Injectable 100 Unit(s) IV Push every other day  losartan 50 milliGRAM(s) Oral daily  vancomycin  IVPB 1000 milliGRAM(s) IV Intermittent every 12 hours      Vital Signs Last 24 Hrs  T(C): 36.6 (08 Dec 2017 05:48), Max: 37.6 (07 Dec 2017 20:20)  T(F): 97.9 (08 Dec 2017 05:48), Max: 99.6 (07 Dec 2017 20:20)  HR: 66 (08 Dec 2017 05:48) (66 - 87)  BP: 111/76 (08 Dec 2017 05:48) (111/71 - 144/83)  BP(mean): --  RR: 17 (08 Dec 2017 05:48) (16 - 17)  SpO2: 96% (08 Dec 2017 05:48) (94% - 96%)  I&O's Detail    06 Dec 2017 07:01  -  07 Dec 2017 07:00  --------------------------------------------------------  IN:    Oral Fluid: 120 mL    TPN (Total Parenteral Nutrition): 876 mL  Total IN: 996 mL    OUT:    Drain: 100 mL  Total OUT: 100 mL    Total NET: 896 mL      07 Dec 2017 07:01  -  08 Dec 2017 06:41  --------------------------------------------------------  IN:    Oral Fluid: 120 mL    Solution: 250 mL    TPN (Total Parenteral Nutrition): 876 mL  Total IN: 1246 mL    OUT:  Total OUT: 0 mL    Total NET: 1246 mL            Physical Exam  General: NAD, alert, interactive, appears comfortable, morbidly obese  Pulm: Nonlabored breathing, no respiratory distress  Abd: softly distended, NT/ND.; abdominal wound dressing leaking stool; cleaned by the bedside during round  Extrem: WWP, no edema, SCDs in place        LABS:                        10.2   4.5   )-----------( 229      ( 06 Dec 2017 07:48 )             33.5     12-07    138  |  100  |  34<H>  ----------------------------<  109<H>  4.2   |  27  |  0.55    Ca    9.8      07 Dec 2017 08:07  Phos  3.5     12-07  Mg     2.1     12-07            RADIOLOGY & ADDITIONAL STUDIES: O/N: ARMANDO OWEN  12/7: Vanco trough 18.9; CK 30;     SUBJECTIVE: Patient seen and examined bedside by surgery team. Patient awake this morning and was being cleaned by nurses. No complaints other than diffuse abdominal pruritus and leaky abdominal dressing. Pain well-controlled. Denies nausea, vomiting, chest pain, shortness of breath, fevers, or chills.       enoxaparin Injectable 40 milliGRAM(s) SubCutaneous two times a day  heparin  flush 100 Units/mL Injectable 100 Unit(s) IV Push every other day  heparin  flush 100 Units/mL Injectable 100 Unit(s) IV Push every other day  losartan 50 milliGRAM(s) Oral daily  vancomycin  IVPB 1000 milliGRAM(s) IV Intermittent every 12 hours      Vital Signs Last 24 Hrs  T(C): 36.6 (08 Dec 2017 05:48), Max: 37.6 (07 Dec 2017 20:20)  T(F): 97.9 (08 Dec 2017 05:48), Max: 99.6 (07 Dec 2017 20:20)  HR: 66 (08 Dec 2017 05:48) (66 - 87)  BP: 111/76 (08 Dec 2017 05:48) (111/71 - 144/83)  BP(mean): --  RR: 17 (08 Dec 2017 05:48) (16 - 17)  SpO2: 96% (08 Dec 2017 05:48) (94% - 96%)  I&O's Detail    06 Dec 2017 07:01  -  07 Dec 2017 07:00  --------------------------------------------------------  IN:    Oral Fluid: 120 mL    TPN (Total Parenteral Nutrition): 876 mL  Total IN: 996 mL    OUT:    Drain: 100 mL  Total OUT: 100 mL    Total NET: 896 mL      07 Dec 2017 07:01  -  08 Dec 2017 06:41  --------------------------------------------------------  IN:    Oral Fluid: 120 mL    Solution: 250 mL    TPN (Total Parenteral Nutrition): 876 mL  Total IN: 1246 mL    OUT:  Total OUT: 0 mL    Total NET: 1246 mL            Physical Exam  General: NAD, alert, interactive, appears comfortable, morbidly obese  Pulm: Nonlabored breathing, no respiratory distress  Abd: softly distended, NT/ND.; abdominal wound dressing leaking stool; cleaned by the bedside during round  Extrem: WWP, no edema, SCDs in place        LABS:                        10.2   4.5   )-----------( 229      ( 06 Dec 2017 07:48 )             33.5     12-07    138  |  100  |  34<H>  ----------------------------<  109<H>  4.2   |  27  |  0.55    Ca    9.8      07 Dec 2017 08:07  Phos  3.5     12-07  Mg     2.1     12-07            RADIOLOGY & ADDITIONAL STUDIES:

## 2017-12-09 LAB
ALBUMIN SERPL ELPH-MCNC: 3.3 G/DL — SIGNIFICANT CHANGE UP (ref 3.3–5)
ALP SERPL-CCNC: 257 U/L — HIGH (ref 40–120)
ALT FLD-CCNC: 45 U/L — SIGNIFICANT CHANGE UP (ref 10–45)
ANION GAP SERPL CALC-SCNC: 11 MMOL/L — SIGNIFICANT CHANGE UP (ref 5–17)
AST SERPL-CCNC: 28 U/L — SIGNIFICANT CHANGE UP (ref 10–40)
BILIRUB SERPL-MCNC: 0.6 MG/DL — SIGNIFICANT CHANGE UP (ref 0.2–1.2)
BUN SERPL-MCNC: 38 MG/DL — HIGH (ref 7–23)
CALCIUM SERPL-MCNC: 10 MG/DL — SIGNIFICANT CHANGE UP (ref 8.4–10.5)
CHLORIDE SERPL-SCNC: 102 MMOL/L — SIGNIFICANT CHANGE UP (ref 96–108)
CK SERPL-CCNC: 22 U/L — LOW (ref 25–170)
CO2 SERPL-SCNC: 26 MMOL/L — SIGNIFICANT CHANGE UP (ref 22–31)
CREAT SERPL-MCNC: 0.61 MG/DL — SIGNIFICANT CHANGE UP (ref 0.5–1.3)
GLUCOSE BLDC GLUCOMTR-MCNC: 114 MG/DL — HIGH (ref 70–99)
GLUCOSE BLDC GLUCOMTR-MCNC: 128 MG/DL — HIGH (ref 70–99)
GLUCOSE BLDC GLUCOMTR-MCNC: 135 MG/DL — HIGH (ref 70–99)
GLUCOSE BLDC GLUCOMTR-MCNC: 87 MG/DL — SIGNIFICANT CHANGE UP (ref 70–99)
GLUCOSE SERPL-MCNC: 108 MG/DL — HIGH (ref 70–99)
HCT VFR BLD CALC: 37 % — SIGNIFICANT CHANGE UP (ref 34.5–45)
HGB BLD-MCNC: 11.1 G/DL — LOW (ref 11.5–15.5)
MAGNESIUM SERPL-MCNC: 2.3 MG/DL — SIGNIFICANT CHANGE UP (ref 1.6–2.6)
MCHC RBC-ENTMCNC: 26.9 PG — LOW (ref 27–34)
MCHC RBC-ENTMCNC: 30 G/DL — LOW (ref 32–36)
MCV RBC AUTO: 89.6 FL — SIGNIFICANT CHANGE UP (ref 80–100)
PHOSPHATE SERPL-MCNC: 3.6 MG/DL — SIGNIFICANT CHANGE UP (ref 2.5–4.5)
PLATELET # BLD AUTO: 237 K/UL — SIGNIFICANT CHANGE UP (ref 150–400)
POTASSIUM SERPL-MCNC: 4.6 MMOL/L — SIGNIFICANT CHANGE UP (ref 3.5–5.3)
POTASSIUM SERPL-SCNC: 4.6 MMOL/L — SIGNIFICANT CHANGE UP (ref 3.5–5.3)
PROT SERPL-MCNC: 7.5 G/DL — SIGNIFICANT CHANGE UP (ref 6–8.3)
RBC # BLD: 4.13 M/UL — SIGNIFICANT CHANGE UP (ref 3.8–5.2)
RBC # FLD: 16.6 % — SIGNIFICANT CHANGE UP (ref 10.3–16.9)
SODIUM SERPL-SCNC: 139 MMOL/L — SIGNIFICANT CHANGE UP (ref 135–145)
TRIGL SERPL-MCNC: 172 MG/DL — HIGH (ref 10–149)
WBC # BLD: 5.2 K/UL — SIGNIFICANT CHANGE UP (ref 3.8–10.5)
WBC # FLD AUTO: 5.2 K/UL — SIGNIFICANT CHANGE UP (ref 3.8–10.5)

## 2017-12-09 RX ORDER — ELECTROLYTE SOLUTION,INJ
1 VIAL (ML) INTRAVENOUS
Qty: 0 | Refills: 0 | Status: DISCONTINUED | OUTPATIENT
Start: 2017-12-09 | End: 2017-12-10

## 2017-12-09 RX ADMIN — Medication 10 MILLIGRAM(S): at 18:04

## 2017-12-09 RX ADMIN — NYSTATIN CREAM 1 APPLICATION(S): 100000 CREAM TOPICAL at 06:32

## 2017-12-09 RX ADMIN — Medication 2: at 00:10

## 2017-12-09 RX ADMIN — Medication 50 MILLIGRAM(S): at 06:19

## 2017-12-09 RX ADMIN — GABAPENTIN 100 MILLIGRAM(S): 400 CAPSULE ORAL at 06:19

## 2017-12-09 RX ADMIN — Medication 5 MILLIGRAM(S): at 22:59

## 2017-12-09 RX ADMIN — Medication 50 MILLIGRAM(S): at 13:10

## 2017-12-09 RX ADMIN — Medication 1 APPLICATION(S): at 06:32

## 2017-12-09 RX ADMIN — Medication 325 MILLIGRAM(S): at 23:35

## 2017-12-09 RX ADMIN — GABAPENTIN 100 MILLIGRAM(S): 400 CAPSULE ORAL at 18:05

## 2017-12-09 RX ADMIN — ESCITALOPRAM OXALATE 20 MILLIGRAM(S): 10 TABLET, FILM COATED ORAL at 22:59

## 2017-12-09 RX ADMIN — Medication 1 EACH: at 18:00

## 2017-12-09 RX ADMIN — Medication 250 MILLIGRAM(S): at 10:14

## 2017-12-09 RX ADMIN — NYSTATIN CREAM 1 APPLICATION(S): 100000 CREAM TOPICAL at 18:10

## 2017-12-09 RX ADMIN — LOSARTAN POTASSIUM 50 MILLIGRAM(S): 100 TABLET, FILM COATED ORAL at 06:20

## 2017-12-09 RX ADMIN — PANTOPRAZOLE SODIUM 40 MILLIGRAM(S): 20 TABLET, DELAYED RELEASE ORAL at 06:20

## 2017-12-09 RX ADMIN — LIDOCAINE 1 PATCH: 4 CREAM TOPICAL at 06:32

## 2017-12-09 RX ADMIN — OCTREOTIDE ACETATE 450 MICROGRAM(S): 200 INJECTION, SOLUTION INTRAVENOUS; SUBCUTANEOUS at 13:09

## 2017-12-09 RX ADMIN — ENOXAPARIN SODIUM 40 MILLIGRAM(S): 100 INJECTION SUBCUTANEOUS at 18:05

## 2017-12-09 RX ADMIN — Medication 50 MILLIGRAM(S): at 18:04

## 2017-12-09 RX ADMIN — Medication 325 MILLIGRAM(S): at 23:02

## 2017-12-09 RX ADMIN — ENOXAPARIN SODIUM 40 MILLIGRAM(S): 100 INJECTION SUBCUTANEOUS at 06:19

## 2017-12-09 RX ADMIN — Medication 10 MILLIGRAM(S): at 06:33

## 2017-12-09 NOTE — PROGRESS NOTE ADULT - ASSESSMENT
57 F s/p  SBR, fistula resection, esophagojejunostomy revision w/ post-op course complicated by RTOR for distal anastomosis breakdown, ATN, acute respiratory failure, & septic shock, MRSA bacteremia which resolved. Currently for wound care management.     CLD/TPN   Pain/nausea control - only benadryl at midnight and dilaudid 0.25mg during PT session   ISS/OOB with PT daily  Nystatin powder, vancomycin 750 Q12 (10/11-11/8) ( Daptomycin 11/22-12/1) Vanco restarted 12/1  Benadryl for abdominal pruritus  SCDs, lovenox (therapeutic), OOBA  Lines :  L left subclavian line (10/4-12/2), L IJ (12/2--)  AM labs, weekly albumin, pre-albumin and triglyceride (every friday).  Wet to dry dressing in place  NGT in fistula

## 2017-12-09 NOTE — PROGRESS NOTE ADULT - SUBJECTIVE AND OBJECTIVE BOX
O/N:VSS. BRAYDEN. wound vac intact.  12/8: diet changed per Caitlyn; PT session; wound vac change O/N:VSS. BRAYDEN. wound vac intact.  12/8: diet changed per Caitlyn; PT session; wound vac change  STATUS POST:  Flexible bronchoscopy  Application, VAC device, wound  Exploratory laparotomy  Small bowel resection      POST OPERATIVE DAY #:     Vital Signs Last 24 Hrs  T(C): 37.2 (09 Dec 2017 14:22), Max: 37.2 (09 Dec 2017 14:22)  T(F): 99 (09 Dec 2017 14:22), Max: 99 (09 Dec 2017 14:22)  HR: 82 (09 Dec 2017 14:22) (65 - 83)  BP: 111/75 (09 Dec 2017 14:22) (111/75 - 148/92)  BP(mean): --  RR: 17 (09 Dec 2017 14:22) (16 - 17)  SpO2: 94% (09 Dec 2017 14:22) (91% - 95%)    I&O's Summary    08 Dec 2017 07:01  -  09 Dec 2017 07:00  --------------------------------------------------------  IN: 2179 mL / OUT: 500 mL / NET: 1679 mL    09 Dec 2017 07:01  -  09 Dec 2017 16:53  --------------------------------------------------------  IN: 360 mL / OUT: 0 mL / NET: 360 mL        SUBJECTIVE: Pt seen and examined at bedside. Complaining about diet    Pain: [ ] YES x[ ] NO  Pain (0-10):              Pain Control Adequate: [x ] YES [ ] NO  SOB: [ ]YES [x ] NO  Chest Discomfort: [ ] YES [x ] NO    Nausea: [ ] YES [x ] NO           Vomiting: [ ] YES [x ] NO  Flatus: [ ] YES [ ] NO             Bowel Movement: [ ] YES [ ] NO     Void: [ ]YES [ ]No    Physical Exam  General: NAD, alert, interactive, appears comfortable  C/V: NSR  Pulm: Nonlabored breathing, no respiratory distress  Abd: wound vac in place, nontender to palpation  Extrem: WWP, no edema, SCDs in place    LABS:                        11.1   5.2   )-----------( 237      ( 09 Dec 2017 08:03 )             37.0     12-09    139  |  102  |  38<H>  ----------------------------<  108<H>  4.6   |  26  |  0.61    Ca    10.0      09 Dec 2017 08:03  Phos  3.6     12-09  Mg     2.3     12-09    TPro  7.5  /  Alb  3.3  /  TBili  0.6  /  DBili  x   /  AST  28  /  ALT  45  /  AlkPhos  257<H>  12-09          RADIOLOGY & ADDITIONAL STUDIES:

## 2017-12-10 LAB
ANION GAP SERPL CALC-SCNC: 10 MMOL/L — SIGNIFICANT CHANGE UP (ref 5–17)
BUN SERPL-MCNC: 36 MG/DL — HIGH (ref 7–23)
CALCIUM SERPL-MCNC: 9.7 MG/DL — SIGNIFICANT CHANGE UP (ref 8.4–10.5)
CHLORIDE SERPL-SCNC: 103 MMOL/L — SIGNIFICANT CHANGE UP (ref 96–108)
CO2 SERPL-SCNC: 27 MMOL/L — SIGNIFICANT CHANGE UP (ref 22–31)
CREAT SERPL-MCNC: 0.58 MG/DL — SIGNIFICANT CHANGE UP (ref 0.5–1.3)
GLUCOSE BLDC GLUCOMTR-MCNC: 113 MG/DL — HIGH (ref 70–99)
GLUCOSE BLDC GLUCOMTR-MCNC: 124 MG/DL — HIGH (ref 70–99)
GLUCOSE BLDC GLUCOMTR-MCNC: 128 MG/DL — HIGH (ref 70–99)
GLUCOSE SERPL-MCNC: 119 MG/DL — HIGH (ref 70–99)
HCT VFR BLD CALC: 34.2 % — LOW (ref 34.5–45)
HGB BLD-MCNC: 10.2 G/DL — LOW (ref 11.5–15.5)
MAGNESIUM SERPL-MCNC: 2.1 MG/DL — SIGNIFICANT CHANGE UP (ref 1.6–2.6)
MCHC RBC-ENTMCNC: 26.8 PG — LOW (ref 27–34)
MCHC RBC-ENTMCNC: 29.8 G/DL — LOW (ref 32–36)
MCV RBC AUTO: 90 FL — SIGNIFICANT CHANGE UP (ref 80–100)
PHOSPHATE SERPL-MCNC: 3.6 MG/DL — SIGNIFICANT CHANGE UP (ref 2.5–4.5)
PLATELET # BLD AUTO: 210 K/UL — SIGNIFICANT CHANGE UP (ref 150–400)
POTASSIUM SERPL-MCNC: 4.3 MMOL/L — SIGNIFICANT CHANGE UP (ref 3.5–5.3)
POTASSIUM SERPL-SCNC: 4.3 MMOL/L — SIGNIFICANT CHANGE UP (ref 3.5–5.3)
RBC # BLD: 3.8 M/UL — SIGNIFICANT CHANGE UP (ref 3.8–5.2)
RBC # FLD: 16.7 % — SIGNIFICANT CHANGE UP (ref 10.3–16.9)
SODIUM SERPL-SCNC: 140 MMOL/L — SIGNIFICANT CHANGE UP (ref 135–145)
WBC # BLD: 5.5 K/UL — SIGNIFICANT CHANGE UP (ref 3.8–10.5)
WBC # FLD AUTO: 5.5 K/UL — SIGNIFICANT CHANGE UP (ref 3.8–10.5)

## 2017-12-10 RX ORDER — I.V. FAT EMULSION 20 G/100ML
50 EMULSION INTRAVENOUS ONCE
Qty: 0 | Refills: 0 | Status: COMPLETED | OUTPATIENT
Start: 2017-12-10 | End: 2017-12-10

## 2017-12-10 RX ORDER — ELECTROLYTE SOLUTION,INJ
1 VIAL (ML) INTRAVENOUS
Qty: 0 | Refills: 0 | Status: DISCONTINUED | OUTPATIENT
Start: 2017-12-10 | End: 2017-12-10

## 2017-12-10 RX ORDER — ACETAMINOPHEN 500 MG
325 TABLET ORAL EVERY 4 HOURS
Qty: 0 | Refills: 0 | Status: DISCONTINUED | OUTPATIENT
Start: 2017-12-10 | End: 2018-01-22

## 2017-12-10 RX ADMIN — ESCITALOPRAM OXALATE 20 MILLIGRAM(S): 10 TABLET, FILM COATED ORAL at 21:52

## 2017-12-10 RX ADMIN — Medication 325 MILLIGRAM(S): at 17:21

## 2017-12-10 RX ADMIN — Medication 10 MILLIGRAM(S): at 06:12

## 2017-12-10 RX ADMIN — Medication 100 UNIT(S): at 13:11

## 2017-12-10 RX ADMIN — NYSTATIN CREAM 1 APPLICATION(S): 100000 CREAM TOPICAL at 18:28

## 2017-12-10 RX ADMIN — I.V. FAT EMULSION 20.83 GRAM(S): 20 EMULSION INTRAVENOUS at 21:53

## 2017-12-10 RX ADMIN — Medication 325 MILLIGRAM(S): at 06:21

## 2017-12-10 RX ADMIN — Medication 10 MILLIGRAM(S): at 18:26

## 2017-12-10 RX ADMIN — ONDANSETRON 4 MILLIGRAM(S): 8 TABLET, FILM COATED ORAL at 00:14

## 2017-12-10 RX ADMIN — OCTREOTIDE ACETATE 450 MICROGRAM(S): 200 INJECTION, SOLUTION INTRAVENOUS; SUBCUTANEOUS at 13:10

## 2017-12-10 RX ADMIN — Medication 325 MILLIGRAM(S): at 07:00

## 2017-12-10 RX ADMIN — Medication 325 MILLIGRAM(S): at 16:12

## 2017-12-10 RX ADMIN — LOSARTAN POTASSIUM 50 MILLIGRAM(S): 100 TABLET, FILM COATED ORAL at 06:12

## 2017-12-10 RX ADMIN — NYSTATIN CREAM 1 APPLICATION(S): 100000 CREAM TOPICAL at 06:17

## 2017-12-10 RX ADMIN — Medication 250 MILLIGRAM(S): at 21:53

## 2017-12-10 RX ADMIN — ENOXAPARIN SODIUM 40 MILLIGRAM(S): 100 INJECTION SUBCUTANEOUS at 06:13

## 2017-12-10 RX ADMIN — GABAPENTIN 100 MILLIGRAM(S): 400 CAPSULE ORAL at 18:26

## 2017-12-10 RX ADMIN — ONDANSETRON 4 MILLIGRAM(S): 8 TABLET, FILM COATED ORAL at 21:52

## 2017-12-10 RX ADMIN — Medication 5 MILLIGRAM(S): at 21:52

## 2017-12-10 RX ADMIN — Medication 1 APPLICATION(S): at 06:17

## 2017-12-10 RX ADMIN — Medication 1 EACH: at 18:28

## 2017-12-10 RX ADMIN — Medication 325 MILLIGRAM(S): at 22:30

## 2017-12-10 RX ADMIN — Medication 50 MILLIGRAM(S): at 13:10

## 2017-12-10 RX ADMIN — Medication 50 MILLIGRAM(S): at 18:26

## 2017-12-10 RX ADMIN — Medication 50 MILLIGRAM(S): at 06:13

## 2017-12-10 RX ADMIN — Medication 50 MILLIGRAM(S): at 00:15

## 2017-12-10 RX ADMIN — Medication 325 MILLIGRAM(S): at 21:53

## 2017-12-10 RX ADMIN — Medication 250 MILLIGRAM(S): at 10:20

## 2017-12-10 RX ADMIN — Medication 250 MILLIGRAM(S): at 22:59

## 2017-12-10 RX ADMIN — ENOXAPARIN SODIUM 40 MILLIGRAM(S): 100 INJECTION SUBCUTANEOUS at 18:26

## 2017-12-10 RX ADMIN — PANTOPRAZOLE SODIUM 40 MILLIGRAM(S): 20 TABLET, DELAYED RELEASE ORAL at 06:13

## 2017-12-10 RX ADMIN — GABAPENTIN 100 MILLIGRAM(S): 400 CAPSULE ORAL at 06:13

## 2017-12-10 NOTE — PROGRESS NOTE ADULT - SUBJECTIVE AND OBJECTIVE BOX
O/N: dressing reinforced, VSS. BRAYDEN  12/9: BRAYDEN O/N: dressing reinforced, VSS. BRAYDEN  12/9: BRAYDEN      enoxaparin Injectable 40 milliGRAM(s) SubCutaneous two times a day  heparin  flush 100 Units/mL Injectable 100 Unit(s) IV Push every other day  heparin  flush 100 Units/mL Injectable 100 Unit(s) IV Push every other day  losartan 50 milliGRAM(s) Oral daily  vancomycin  IVPB 1000 milliGRAM(s) IV Intermittent every 12 hours      Vital Signs Last 24 Hrs  T(C): 36.8 (10 Dec 2017 06:52), Max: 37.2 (09 Dec 2017 14:22)  T(F): 98.3 (10 Dec 2017 06:52), Max: 99 (09 Dec 2017 14:22)  HR: 66 (10 Dec 2017 06:52) (66 - 83)  BP: 126/81 (10 Dec 2017 06:52) (111/75 - 137/85)  BP(mean): --  RR: 16 (10 Dec 2017 06:52) (16 - 17)  SpO2: 95% (10 Dec 2017 06:52) (94% - 95%)  I&O's Detail    09 Dec 2017 07:01  -  10 Dec 2017 07:00  --------------------------------------------------------  IN:    Oral Fluid: 360 mL    TPN (Total Parenteral Nutrition): 876 mL  Total IN: 1236 mL    OUT:    Drain: 300 mL  Total OUT: 300 mL    Total NET: 936 mL          General: NAD, resting comfortably in bed  C/V: NSR  Pulm: Nonlabored breathing, no respiratory distress  Abd: soft, NT/ND, fistula w/ leakage after eating  Extrem: WWP, SCDs in place        LABS:                        10.2   5.5   )-----------( 210      ( 10 Dec 2017 08:19 )             34.2     12-10    140  |  103  |  36<H>  ----------------------------<  119<H>  4.3   |  27  |  0.58    Ca    9.7      10 Dec 2017 08:19  Phos  3.6     12-10  Mg     2.1     12-10    TPro  7.5  /  Alb  3.3  /  TBili  0.6  /  DBili  x   /  AST  28  /  ALT  45  /  AlkPhos  257<H>  12-09          RADIOLOGY & ADDITIONAL STUDIES:

## 2017-12-10 NOTE — PROGRESS NOTE ADULT - ASSESSMENT
57 F s/p  SBR, fistula resection, esophagojejunostomy revision w/ post-op course complicated by RTOR for distal anastomosis breakdown, ATN, acute respiratory failure, & septic shock, MRSA bacteremia which resolved. Currently for wound care management.     CLD/TPN   Pain/nausea control - only benadryl at midnight and dilaudid 0.25mg during PT session   ISS/OOB with PT daily  Nystatin powder, vancomycin 750 Q12 (10/11-11/8) ( Daptomycin 11/22-12/1) Vanco restarted 12/1  Benadryl for abdominal pruritus  SCDs, lovenox (therapeutic), OOBA  Lines :  L left subclavian line (10/4-12/2), L IJ (12/2--)  AM labs, weekly albumin, pre-albumin and triglyceride (every friday).  Wet to dry dressing in place

## 2017-12-11 LAB
ANION GAP SERPL CALC-SCNC: 11 MMOL/L — SIGNIFICANT CHANGE UP (ref 5–17)
BUN SERPL-MCNC: 39 MG/DL — HIGH (ref 7–23)
CALCIUM SERPL-MCNC: 10 MG/DL — SIGNIFICANT CHANGE UP (ref 8.4–10.5)
CHLORIDE SERPL-SCNC: 101 MMOL/L — SIGNIFICANT CHANGE UP (ref 96–108)
CK SERPL-CCNC: 14 U/L — LOW (ref 25–170)
CO2 SERPL-SCNC: 28 MMOL/L — SIGNIFICANT CHANGE UP (ref 22–31)
CREAT SERPL-MCNC: 0.59 MG/DL — SIGNIFICANT CHANGE UP (ref 0.5–1.3)
GLUCOSE BLDC GLUCOMTR-MCNC: 115 MG/DL — HIGH (ref 70–99)
GLUCOSE BLDC GLUCOMTR-MCNC: 115 MG/DL — HIGH (ref 70–99)
GLUCOSE BLDC GLUCOMTR-MCNC: 119 MG/DL — HIGH (ref 70–99)
GLUCOSE BLDC GLUCOMTR-MCNC: 132 MG/DL — HIGH (ref 70–99)
GLUCOSE SERPL-MCNC: 90 MG/DL — SIGNIFICANT CHANGE UP (ref 70–99)
MAGNESIUM SERPL-MCNC: 2.1 MG/DL — SIGNIFICANT CHANGE UP (ref 1.6–2.6)
PHOSPHATE SERPL-MCNC: 3.6 MG/DL — SIGNIFICANT CHANGE UP (ref 2.5–4.5)
POTASSIUM SERPL-MCNC: 4.4 MMOL/L — SIGNIFICANT CHANGE UP (ref 3.5–5.3)
POTASSIUM SERPL-SCNC: 4.4 MMOL/L — SIGNIFICANT CHANGE UP (ref 3.5–5.3)
SODIUM SERPL-SCNC: 140 MMOL/L — SIGNIFICANT CHANGE UP (ref 135–145)
VANCOMYCIN TROUGH SERPL-MCNC: 19.3 UG/ML — SIGNIFICANT CHANGE UP (ref 10–20)

## 2017-12-11 PROCEDURE — 99232 SBSQ HOSP IP/OBS MODERATE 35: CPT

## 2017-12-11 RX ORDER — ACETAMINOPHEN 500 MG
1000 TABLET ORAL ONCE
Qty: 0 | Refills: 0 | Status: COMPLETED | OUTPATIENT
Start: 2017-12-11 | End: 2017-12-11

## 2017-12-11 RX ORDER — ELECTROLYTE SOLUTION,INJ
1 VIAL (ML) INTRAVENOUS
Qty: 0 | Refills: 0 | Status: DISCONTINUED | OUTPATIENT
Start: 2017-12-11 | End: 2017-12-11

## 2017-12-11 RX ADMIN — Medication 325 MILLIGRAM(S): at 22:00

## 2017-12-11 RX ADMIN — Medication 250 MILLIGRAM(S): at 12:50

## 2017-12-11 RX ADMIN — Medication 1 APPLICATION(S): at 06:30

## 2017-12-11 RX ADMIN — Medication 50 MILLIGRAM(S): at 12:51

## 2017-12-11 RX ADMIN — NYSTATIN CREAM 1 APPLICATION(S): 100000 CREAM TOPICAL at 06:30

## 2017-12-11 RX ADMIN — Medication 50 MILLIGRAM(S): at 21:01

## 2017-12-11 RX ADMIN — ONDANSETRON 4 MILLIGRAM(S): 8 TABLET, FILM COATED ORAL at 16:06

## 2017-12-11 RX ADMIN — ENOXAPARIN SODIUM 40 MILLIGRAM(S): 100 INJECTION SUBCUTANEOUS at 06:24

## 2017-12-11 RX ADMIN — Medication 325 MILLIGRAM(S): at 15:49

## 2017-12-11 RX ADMIN — ESCITALOPRAM OXALATE 20 MILLIGRAM(S): 10 TABLET, FILM COATED ORAL at 21:01

## 2017-12-11 RX ADMIN — NYSTATIN CREAM 1 APPLICATION(S): 100000 CREAM TOPICAL at 17:42

## 2017-12-11 RX ADMIN — Medication 325 MILLIGRAM(S): at 16:30

## 2017-12-11 RX ADMIN — GABAPENTIN 100 MILLIGRAM(S): 400 CAPSULE ORAL at 06:24

## 2017-12-11 RX ADMIN — Medication 50 MILLIGRAM(S): at 17:41

## 2017-12-11 RX ADMIN — GABAPENTIN 100 MILLIGRAM(S): 400 CAPSULE ORAL at 17:42

## 2017-12-11 RX ADMIN — ENOXAPARIN SODIUM 40 MILLIGRAM(S): 100 INJECTION SUBCUTANEOUS at 17:41

## 2017-12-11 RX ADMIN — Medication 10 MILLIGRAM(S): at 17:42

## 2017-12-11 RX ADMIN — Medication 50 MILLIGRAM(S): at 00:40

## 2017-12-11 RX ADMIN — Medication 1 EACH: at 17:44

## 2017-12-11 RX ADMIN — Medication 5 MILLIGRAM(S): at 21:00

## 2017-12-11 RX ADMIN — Medication 1000 MILLIGRAM(S): at 04:40

## 2017-12-11 RX ADMIN — Medication 400 MILLIGRAM(S): at 04:26

## 2017-12-11 RX ADMIN — CALCITRIOL 1 MICROGRAM(S): 0.5 CAPSULE ORAL at 12:51

## 2017-12-11 RX ADMIN — LOSARTAN POTASSIUM 50 MILLIGRAM(S): 100 TABLET, FILM COATED ORAL at 06:24

## 2017-12-11 RX ADMIN — Medication 325 MILLIGRAM(S): at 21:00

## 2017-12-11 RX ADMIN — Medication 50 MILLIGRAM(S): at 06:25

## 2017-12-11 RX ADMIN — Medication 10 MILLIGRAM(S): at 06:25

## 2017-12-11 RX ADMIN — OCTREOTIDE ACETATE 450 MICROGRAM(S): 200 INJECTION, SOLUTION INTRAVENOUS; SUBCUTANEOUS at 15:49

## 2017-12-11 RX ADMIN — PANTOPRAZOLE SODIUM 40 MILLIGRAM(S): 20 TABLET, DELAYED RELEASE ORAL at 06:24

## 2017-12-11 NOTE — PROGRESS NOTE ADULT - SUBJECTIVE AND OBJECTIVE BOX
INTERVAL HPI/OVERNIGHT EVENTS: No acute events        SUBJECTIVE:  Flatus: [ ] YES [ ] NO             Bowel Movement: [ ] YES [ ] NO  Pain (0-10):            Pain Control Adequate: [ ] YES [ ] NO  Nausea: [ ] YES [ ] NO            Vomiting: [ ] YES [ ] NO  Diarrhea: [ ] YES [ ] NO         Constipation: [ ] YES [ ] NO     Chest Pain: [ ] YES [ ] NO    SOB:  [ ] YES [ ] NO    MEDICATIONS  (STANDING):  bismuth subsalicylate Liquid 30 milliLiter(s) Oral daily  calcitriol Injectable 1 MICROGram(s) IV Push <User Schedule>  collagenase Ointment 1 Application(s) Topical daily  cyanocobalamin Injectable 1000 MICROGram(s) SubCutaneous every 7 days  dextrose 5%. 1000 milliLiter(s) (50 mL/Hr) IV Continuous <Continuous>  dextrose 50% Injectable 25 Gram(s) IV Push once  dextrose 50% Injectable 12.5 Gram(s) IV Push once  diazepam    Tablet 5 milliGRAM(s) Oral at bedtime  diphenhydrAMINE   Capsule 50 milliGRAM(s) Oral every 6 hours  diphenhydrAMINE   Injectable 50 milliGRAM(s) IV Push at bedtime  enoxaparin Injectable 40 milliGRAM(s) SubCutaneous two times a day  escitalopram 20 milliGRAM(s) Oral daily  gabapentin 100 milliGRAM(s) Oral two times a day  heparin  flush 100 Units/mL Injectable 100 Unit(s) IV Push every other day  heparin  flush 100 Units/mL Injectable 100 Unit(s) IV Push every other day  insulin lispro (HumaLOG) corrective regimen sliding scale   SubCutaneous every 6 hours  lidocaine   Patch 1 Patch Transdermal daily  losartan 50 milliGRAM(s) Oral daily  nystatin Powder 1 Application(s) Topical two times a day  octreotide  Injectable 450 MICROGram(s) IV Push daily  oxybutynin 10 milliGRAM(s) Oral two times a day  pantoprazole  Injectable 40 milliGRAM(s) IV Push daily  Parenteral Nutrition - Adult 1 Each (73 mL/Hr) TPN Continuous <Continuous>  sodium chloride 0.9% lock flush 20 milliLiter(s) IV Push once  vancomycin  IVPB 1000 milliGRAM(s) IV Intermittent every 12 hours    MEDICATIONS  (PRN):  acetaminophen   Tablet. 325 milliGRAM(s) Oral every 4 hours PRN Moderate Pain (4 - 6)  ondansetron Injectable 4 milliGRAM(s) IV Push every 6 hours PRN Nausea and/or Vomiting  sodium chloride 0.9% lock flush 10 milliLiter(s) IV Push every 1 hour PRN After each medication administration  sodium chloride 0.9% lock flush 10 milliLiter(s) IV Push every 12 hours PRN Lumen of catheter NOT used      Vital Signs Last 24 Hrs  T(C): 37.6 (10 Dec 2017 20:40), Max: 37.6 (10 Dec 2017 20:40)  T(F): 99.6 (10 Dec 2017 20:40), Max: 99.6 (10 Dec 2017 20:40)  HR: 85 (10 Dec 2017 20:40) (66 - 85)  BP: 132/90 (10 Dec 2017 20:40) (126/81 - 147/87)  BP(mean): --  RR: 18 (10 Dec 2017 20:40) (16 - 19)  SpO2: 95% (10 Dec 2017 20:40) (94% - 95%)    PHYSICAL EXAM:      Constitutional: A&Ox3    Breasts:    Respiratory: non labored breathing, no respiratory distress    Cardiovascular: NSR, RRR    Gastrointestinal:                 Incision:    Genitourinary:    Extremities: (-) edema                  I&O's Detail    09 Dec 2017 07:01  -  10 Dec 2017 07:00  --------------------------------------------------------  IN:    Oral Fluid: 360 mL    TPN (Total Parenteral Nutrition): 876 mL  Total IN: 1236 mL    OUT:    Drain: 300 mL  Total OUT: 300 mL    Total NET: 936 mL      10 Dec 2017 07:01  -  11 Dec 2017 01:51  --------------------------------------------------------  IN:    Oral Fluid: 360 mL  Total IN: 360 mL    OUT:    Drain: 100 mL  Total OUT: 100 mL    Total NET: 260 mL          LABS:                        10.2   5.5   )-----------( 210      ( 10 Dec 2017 08:19 )             34.2     12-10    140  |  103  |  36<H>  ----------------------------<  119<H>  4.3   |  27  |  0.58    Ca    9.7      10 Dec 2017 08:19  Phos  3.6     12-10  Mg     2.1     12-10    TPro  7.5  /  Alb  3.3  /  TBili  0.6  /  DBili  x   /  AST  28  /  ALT  45  /  AlkPhos  257<H>  12-09          RADIOLOGY & ADDITIONAL STUDIES: INTERVAL HPI/OVERNIGHT EVENTS: No acute events    SUBJECTIVE: Patient seen and examined bedside by surgery team. Pain well-controlled. Denies nausea, vomiting, chest pain, shortness of breath, fevers, or chills. Complained of leaky abdominal dressing.      enoxaparin Injectable 40 milliGRAM(s) SubCutaneous two times a day  heparin  flush 100 Units/mL Injectable 100 Unit(s) IV Push every other day  heparin  flush 100 Units/mL Injectable 100 Unit(s) IV Push every other day  losartan 50 milliGRAM(s) Oral daily  vancomycin  IVPB 1000 milliGRAM(s) IV Intermittent every 12 hours      Vital Signs Last 24 Hrs  T(C): 36.4 (11 Dec 2017 05:50), Max: 37.6 (10 Dec 2017 20:40)  T(F): 97.6 (11 Dec 2017 05:50), Max: 99.6 (10 Dec 2017 20:40)  HR: 70 (11 Dec 2017 05:50) (70 - 85)  BP: 119/77 (11 Dec 2017 05:50) (119/77 - 147/87)  BP(mean): --  RR: 17 (11 Dec 2017 05:50) (17 - 19)  SpO2: 96% (11 Dec 2017 05:50) (94% - 96%)  I&O's Detail    10 Dec 2017 07:01  -  11 Dec 2017 07:00  --------------------------------------------------------  IN:    Oral Fluid: 360 mL  Total IN: 360 mL    OUT:    Drain: 200 mL  Total OUT: 200 mL    Total NET: 160 mL            Physical Exam  General: NAD, alert, interactive, appears comfortable, morbidly obese  C/V: NSR  Pulm: Nonlabored breathing, no respiratory distress  Abd: 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

## 2017-12-11 NOTE — PROGRESS NOTE ADULT - ASSESSMENT
IMPRESSION:  MRSA bacteremia.  Patient stable from ID standpoint    Recommend:    1.  Can continue vancomycin 1 gram IV q12 until 12/26/2017  2.  Monitor trough twice a week     Will sign off.  Please reconsult with any questions

## 2017-12-11 NOTE — PROGRESS NOTE ADULT - SUBJECTIVE AND OBJECTIVE BOX
INTERVAL HPI/OVERNIGHT EVENTS:    Patient was seen and examined at bedside, no complaints    CONSTITUTIONAL:  Negative fever or chills, feels well, good appetite  EYES:  Negative  blurry vision or double vision  CARDIOVASCULAR:  Negative for chest pain or palpitations  RESPIRATORY:  Negative for cough, wheezing, or SOB   GASTROINTESTINAL:  Negative for nausea, vomiting, diarrhea, constipation, or abdominal pain  GENITOURINARY:  Negative frequency, urgency or dysuria  NEUROLOGIC:  No headache, confusion, dizziness, lightheadedness      ANTIBIOTICS/RELEVANT:    MEDICATIONS  (STANDING):  bismuth subsalicylate Liquid 30 milliLiter(s) Oral daily  calcitriol Injectable 1 MICROGram(s) IV Push <User Schedule>  collagenase Ointment 1 Application(s) Topical daily  cyanocobalamin Injectable 1000 MICROGram(s) SubCutaneous every 7 days  dextrose 5%. 1000 milliLiter(s) (50 mL/Hr) IV Continuous <Continuous>  dextrose 50% Injectable 25 Gram(s) IV Push once  dextrose 50% Injectable 12.5 Gram(s) IV Push once  diazepam    Tablet 5 milliGRAM(s) Oral at bedtime  diphenhydrAMINE   Capsule 50 milliGRAM(s) Oral every 6 hours  diphenhydrAMINE   Injectable 50 milliGRAM(s) IV Push at bedtime  enoxaparin Injectable 40 milliGRAM(s) SubCutaneous two times a day  escitalopram 20 milliGRAM(s) Oral daily  gabapentin 100 milliGRAM(s) Oral two times a day  heparin  flush 100 Units/mL Injectable 100 Unit(s) IV Push every other day  heparin  flush 100 Units/mL Injectable 100 Unit(s) IV Push every other day  insulin lispro (HumaLOG) corrective regimen sliding scale   SubCutaneous every 6 hours  lidocaine   Patch 1 Patch Transdermal daily  losartan 50 milliGRAM(s) Oral daily  nystatin Powder 1 Application(s) Topical two times a day  octreotide  Injectable 450 MICROGram(s) IV Push daily  oxybutynin 10 milliGRAM(s) Oral two times a day  pantoprazole  Injectable 40 milliGRAM(s) IV Push daily  Parenteral Nutrition - Adult 1 Each (73 mL/Hr) TPN Continuous <Continuous>  Parenteral Nutrition - Adult 1 Each (73 mL/Hr) TPN Continuous <Continuous>  sodium chloride 0.9% lock flush 20 milliLiter(s) IV Push once  vancomycin  IVPB 1000 milliGRAM(s) IV Intermittent every 12 hours    MEDICATIONS  (PRN):  acetaminophen   Tablet. 325 milliGRAM(s) Oral every 4 hours PRN Moderate Pain (4 - 6)  ondansetron Injectable 4 milliGRAM(s) IV Push every 6 hours PRN Nausea and/or Vomiting  sodium chloride 0.9% lock flush 10 milliLiter(s) IV Push every 1 hour PRN After each medication administration  sodium chloride 0.9% lock flush 10 milliLiter(s) IV Push every 12 hours PRN Lumen of catheter NOT used        Vital Signs Last 24 Hrs  T(C): 36.8 (11 Dec 2017 08:30), Max: 37.6 (10 Dec 2017 20:40)  T(F): 98.2 (11 Dec 2017 08:30), Max: 99.6 (10 Dec 2017 20:40)  HR: 81 (11 Dec 2017 08:30) (70 - 85)  BP: 117/78 (11 Dec 2017 08:30) (117/78 - 147/87)  BP(mean): --  RR: 16 (11 Dec 2017 08:30) (16 - 19)  SpO2: 93% (11 Dec 2017 08:30) (93% - 96%)    PHYSICAL EXAM:  Constitutional:  obese female, no complaints  Eyes:CL, EOMI  Ear/Nose/Throat:  no sinus tenderness on percussion	  Neck:  supple  Respiratory: clear  Cardiovascular: S1S2 RRR, no murmurs  Gastrointestinal: abdominal wound without tenderness  Extremities:  + edema  Vascular: DP Pulse:	right normal; left normal      LABS:                        10.2   5.5   )-----------( 210      ( 10 Dec 2017 08:19 )             34.2     12-11    140  |  101  |  39<H>  ----------------------------<  90  4.4   |  28  |  0.59    Ca    10.0      11 Dec 2017 08:20  Phos  3.6     12-11  Mg     2.1     12-11            MICROBIOLOGY:  Culture - Blood (11.28.17 @ 08:18)    Specimen Source: .Blood Blood    Culture Results:   No growth at 5 days.        RADIOLOGY & ADDITIONAL STUDIES:

## 2017-12-12 LAB
ANION GAP SERPL CALC-SCNC: 10 MMOL/L — SIGNIFICANT CHANGE UP (ref 5–17)
BUN SERPL-MCNC: 35 MG/DL — HIGH (ref 7–23)
CALCIUM SERPL-MCNC: 9.8 MG/DL — SIGNIFICANT CHANGE UP (ref 8.4–10.5)
CHLORIDE SERPL-SCNC: 103 MMOL/L — SIGNIFICANT CHANGE UP (ref 96–108)
CO2 SERPL-SCNC: 27 MMOL/L — SIGNIFICANT CHANGE UP (ref 22–31)
CREAT SERPL-MCNC: 0.57 MG/DL — SIGNIFICANT CHANGE UP (ref 0.5–1.3)
GLUCOSE BLDC GLUCOMTR-MCNC: 106 MG/DL — HIGH (ref 70–99)
GLUCOSE BLDC GLUCOMTR-MCNC: 114 MG/DL — HIGH (ref 70–99)
GLUCOSE BLDC GLUCOMTR-MCNC: 135 MG/DL — HIGH (ref 70–99)
GLUCOSE BLDC GLUCOMTR-MCNC: 177 MG/DL — HIGH (ref 70–99)
GLUCOSE SERPL-MCNC: 106 MG/DL — HIGH (ref 70–99)
MAGNESIUM SERPL-MCNC: 2 MG/DL — SIGNIFICANT CHANGE UP (ref 1.6–2.6)
PHOSPHATE SERPL-MCNC: 3.5 MG/DL — SIGNIFICANT CHANGE UP (ref 2.5–4.5)
POTASSIUM SERPL-MCNC: 4.2 MMOL/L — SIGNIFICANT CHANGE UP (ref 3.5–5.3)
POTASSIUM SERPL-SCNC: 4.2 MMOL/L — SIGNIFICANT CHANGE UP (ref 3.5–5.3)
SODIUM SERPL-SCNC: 140 MMOL/L — SIGNIFICANT CHANGE UP (ref 135–145)

## 2017-12-12 RX ORDER — ELECTROLYTE SOLUTION,INJ
1 VIAL (ML) INTRAVENOUS
Qty: 0 | Refills: 0 | Status: DISCONTINUED | OUTPATIENT
Start: 2017-12-12 | End: 2017-12-12

## 2017-12-12 RX ORDER — I.V. FAT EMULSION 20 G/100ML
50 EMULSION INTRAVENOUS ONCE
Qty: 0 | Refills: 0 | Status: COMPLETED | OUTPATIENT
Start: 2017-12-12 | End: 2017-12-12

## 2017-12-12 RX ADMIN — NYSTATIN CREAM 1 APPLICATION(S): 100000 CREAM TOPICAL at 06:16

## 2017-12-12 RX ADMIN — GABAPENTIN 100 MILLIGRAM(S): 400 CAPSULE ORAL at 17:40

## 2017-12-12 RX ADMIN — Medication 250 MILLIGRAM(S): at 00:00

## 2017-12-12 RX ADMIN — OCTREOTIDE ACETATE 450 MICROGRAM(S): 200 INJECTION, SOLUTION INTRAVENOUS; SUBCUTANEOUS at 11:44

## 2017-12-12 RX ADMIN — PANTOPRAZOLE SODIUM 40 MILLIGRAM(S): 20 TABLET, DELAYED RELEASE ORAL at 06:17

## 2017-12-12 RX ADMIN — Medication 325 MILLIGRAM(S): at 06:15

## 2017-12-12 RX ADMIN — Medication 1 APPLICATION(S): at 06:16

## 2017-12-12 RX ADMIN — ENOXAPARIN SODIUM 40 MILLIGRAM(S): 100 INJECTION SUBCUTANEOUS at 06:17

## 2017-12-12 RX ADMIN — ESCITALOPRAM OXALATE 20 MILLIGRAM(S): 10 TABLET, FILM COATED ORAL at 21:00

## 2017-12-12 RX ADMIN — Medication 1 EACH: at 17:41

## 2017-12-12 RX ADMIN — Medication 10 MILLIGRAM(S): at 06:15

## 2017-12-12 RX ADMIN — Medication 5 MILLIGRAM(S): at 21:00

## 2017-12-12 RX ADMIN — Medication 100 UNIT(S): at 11:36

## 2017-12-12 RX ADMIN — Medication 325 MILLIGRAM(S): at 22:00

## 2017-12-12 RX ADMIN — Medication 50 MILLIGRAM(S): at 11:36

## 2017-12-12 RX ADMIN — ONDANSETRON 4 MILLIGRAM(S): 8 TABLET, FILM COATED ORAL at 06:15

## 2017-12-12 RX ADMIN — Medication 50 MILLIGRAM(S): at 21:00

## 2017-12-12 RX ADMIN — ENOXAPARIN SODIUM 40 MILLIGRAM(S): 100 INJECTION SUBCUTANEOUS at 17:40

## 2017-12-12 RX ADMIN — Medication 250 MILLIGRAM(S): at 11:35

## 2017-12-12 RX ADMIN — Medication 325 MILLIGRAM(S): at 06:50

## 2017-12-12 RX ADMIN — NYSTATIN CREAM 1 APPLICATION(S): 100000 CREAM TOPICAL at 17:43

## 2017-12-12 RX ADMIN — LOSARTAN POTASSIUM 50 MILLIGRAM(S): 100 TABLET, FILM COATED ORAL at 06:16

## 2017-12-12 RX ADMIN — Medication 50 MILLIGRAM(S): at 17:40

## 2017-12-12 RX ADMIN — Medication 325 MILLIGRAM(S): at 20:59

## 2017-12-12 RX ADMIN — Medication 2: at 00:58

## 2017-12-12 RX ADMIN — Medication 10 MILLIGRAM(S): at 17:41

## 2017-12-12 RX ADMIN — Medication 50 MILLIGRAM(S): at 06:16

## 2017-12-12 RX ADMIN — GABAPENTIN 100 MILLIGRAM(S): 400 CAPSULE ORAL at 06:15

## 2017-12-12 RX ADMIN — I.V. FAT EMULSION 20.83 GRAM(S): 20 EMULSION INTRAVENOUS at 21:09

## 2017-12-12 NOTE — CHART NOTE - NSCHARTNOTEFT_GEN_A_CORE
Admitting Diagnosis:   Patient is a 57y old  Female who presents with a chief complaint of enterocutaneous fistula (24 Jul 2017 14:15)      PAST MEDICAL & SURGICAL HISTORY:  Reflux  Obesity  DVT (deep venous thrombosis): 2013 neck  Diverticulitis  Hypertension  Elective surgery: abodominal wall surgery  dec 2016  Elective surgery: NOV 2016 gastric bypass revision  Gastric bypass status for obesity: gastric sleeve, 6/2012  S/P breast biopsy: 2011, left  S/P colon resection: 2011  S/P knee surgery: repair 2009, 2011  right; left  Umbilical hernia: 2000  S/P appendectomy: 1975  S/P tonsillectomy: 1967    Current Nutrition Order:  NPO except ice chips and water  >> TPN via central venous line:  1752 TV (73ml/hr); 355g D, 140g AA, 50g 20% lipids (1767kcal no lipid calories) and 50g 20% lipids; total kcal w/ lipids: (2267 kcal, 2.7g/kg IBW pro, GIR 3.30)  Previously, pt was allowed by MD to have 1 clear liquid option at breakfast and dinner and 1 pureed option at lunch.    PO Intake: Good (%) [   ]  Fair (50-75%) [   ] Poor (<25%) [   ] N/A     GI Issues: No reported GI distress at this time.    Pain: No C/o pain.    Skin Integrity: sacrum un-stageable PU; sacral abrasion; abd wound--> NGtube placed w/ sponge through pouch into fistula for suction      Labs:   12-12    140  |  103  |  35<H>  ----------------------------<  106<H>  4.2   |  27  |  0.57    Ca    9.8      12 Dec 2017 07:10  Phos  3.5     12-12  Mg     2.0     12-12      CAPILLARY BLOOD GLUCOSE      POCT Blood Glucose.: 135 mg/dL (12 Dec 2017 12:05)  POCT Blood Glucose.: 114 mg/dL (12 Dec 2017 05:58)  POCT Blood Glucose.: 177 mg/dL (12 Dec 2017 00:43)  POCT Blood Glucose.: 119 mg/dL (11 Dec 2017 16:52)      Medications:  MEDICATIONS  (STANDING):  bismuth subsalicylate Liquid 30 milliLiter(s) Oral daily  calcitriol Injectable 1 MICROGram(s) IV Push <User Schedule>  collagenase Ointment 1 Application(s) Topical daily  cyanocobalamin Injectable 1000 MICROGram(s) SubCutaneous every 7 days  dextrose 5%. 1000 milliLiter(s) (50 mL/Hr) IV Continuous <Continuous>  dextrose 50% Injectable 25 Gram(s) IV Push once  dextrose 50% Injectable 12.5 Gram(s) IV Push once  diazepam    Tablet 5 milliGRAM(s) Oral at bedtime  diphenhydrAMINE   Capsule 50 milliGRAM(s) Oral every 6 hours  diphenhydrAMINE   Injectable 50 milliGRAM(s) IV Push at bedtime  enoxaparin Injectable 40 milliGRAM(s) SubCutaneous two times a day  escitalopram 20 milliGRAM(s) Oral daily  fat emulsion (Plant Based) 20% IVPB 50 Gram(s) IV Intermittent once  gabapentin 100 milliGRAM(s) Oral two times a day  heparin  flush 100 Units/mL Injectable 100 Unit(s) IV Push every other day  heparin  flush 100 Units/mL Injectable 100 Unit(s) IV Push every other day  insulin lispro (HumaLOG) corrective regimen sliding scale   SubCutaneous every 6 hours  lidocaine   Patch 1 Patch Transdermal daily  losartan 50 milliGRAM(s) Oral daily  nystatin Powder 1 Application(s) Topical two times a day  octreotide  Injectable 450 MICROGram(s) IV Push daily  oxybutynin 10 milliGRAM(s) Oral two times a day  pantoprazole  Injectable 40 milliGRAM(s) IV Push daily  Parenteral Nutrition - Adult 1 Each (73 mL/Hr) TPN Continuous <Continuous>  Parenteral Nutrition - Adult 1 Each (73 mL/Hr) TPN Continuous <Continuous>  sodium chloride 0.9% lock flush 20 milliLiter(s) IV Push once  vancomycin  IVPB 1000 milliGRAM(s) IV Intermittent every 12 hours    MEDICATIONS  (PRN):  acetaminophen   Tablet. 325 milliGRAM(s) Oral every 4 hours PRN Moderate Pain (4 - 6)  ondansetron Injectable 4 milliGRAM(s) IV Push every 6 hours PRN Nausea and/or Vomiting  sodium chloride 0.9% lock flush 10 milliLiter(s) IV Push every 1 hour PRN After each medication administration  sodium chloride 0.9% lock flush 10 milliLiter(s) IV Push every 12 hours PRN Lumen of catheter NOT used    Weight:  Bed Scale:  151.8lb (12/12)  154.9lb (12/7)  155.9lb (12/5)  162.0lb (11/30)  161.5lb (11/13)  160.5lb (11/8)   Standing Scale:  167lb (11/13)  164lb (10/17)  219lb (8/1)    Weight Change:   03/2016: 89kg  02/2017: 74kg   07/2017: 76kg  11/2017: 76kg     >>Bed scale wt indicates wts are trending down. Spoke w/ RN about obtaining standing wt for accuracy. Likely most feasible when pt is doing PT. Will follow and adjust nutrient intake as needed.     Estimated energy needs:   Estimated energy needs using 52 kg IBW:  increased needs per wound and pressure ulcer healing. needs calculated based on TPN as primary route of nutrition.  30-35 kcal/kg (9516-8101 kcal).   1.8-2 g/kg ( g protein).  30-35 mL/kg (5658-9366 mL fluid).     Subjective:   Clear liquids d/c'd. Pt now NPO w/ ice chips and sips of water. pt. Pt seen resting in bed. Aware of wt changes as she is recording them. Discussed w/ RN obtaining recent wt when pt is up doing PT. No apparent GI distress. Will follow to assess adjusting nutrient needs.  TPN bag infusing at 73ml/hr providing 355g Dex, 140g AA, no lipids today (1767 kcal, 2.7g pro/kg IBW pro, GIR 3.30-based on last taken standing wt).   Please update weights weekly to trend adequacy of nutrient intake via TPN. Bed scale wt has been trending down ~5-10lbs. Please use standing scale for accuracy.     Previous Nutrition Diagnosis: Increased nutrient needs RT increased demand for nutrients AEB wound and pressure ulcer healing   Active [  X]  Resolved [   ]    If resolved, new PES: N/A    Goal: Pt to meet >75% EER via tolerated route    Recommendations:  1) Continue w/ current TPN order as tolerated and assess feasibility of other routes w/wound healing  2) Monitor triglycerides & adjust lipids per MD discretion  3) Please continue to take standing weight for trending purposes  4) Recommend MVI, OsCal, B12    Education: Understands meeting nutrient needs via TPN.    Risk Level: High [  X ] Moderate [   ] Low [   ].

## 2017-12-12 NOTE — PROGRESS NOTE ADULT - SUBJECTIVE AND OBJECTIVE BOX
O/N: BRAYDEN, VSS  12/10: vanco trough 19.3;     STATUS POST:  7/24: SBR resection, fistula resection, revision of esophagogegunostomy drain through esophageal hiatus in R mediastinum  7/29: Ex-lap, SB anastamosis in BP limb breakdown with succus throughout abdomen  8/1: abd washout, drainage of abscess, biologic mesh placement, closure of abdominal wall, vac and retention suture  8/7: abd washout, mesh removal, frozen abd noted, LLQ CHECO replaced with benson drain  8/10: leak noted from previous anastamosis site on L side, mid abdomen malecot drain placed in enterotomy, JPs x 2 placed, necrotic fascia debrided, vicryl mesh sutured to fascia circumferentially, xeroform over mesh then vac dressing placed O/N: ARMANDO OWEN  12/10: vanco trough 19.3;     SUBJECTIVE: Patient seen and examined bedside by surgery team. Pain well-controlled. Denies nausea, vomiting, chest pain, shortness of breath, fevers, or chills.      enoxaparin Injectable 40 milliGRAM(s) SubCutaneous two times a day  heparin  flush 100 Units/mL Injectable 100 Unit(s) IV Push every other day  heparin  flush 100 Units/mL Injectable 100 Unit(s) IV Push every other day  losartan 50 milliGRAM(s) Oral daily  vancomycin  IVPB 1000 milliGRAM(s) IV Intermittent every 12 hours      Vital Signs Last 24 Hrs  T(C): 36.1 (12 Dec 2017 05:51), Max: 37 (11 Dec 2017 13:32)  T(F): 97 (12 Dec 2017 05:51), Max: 98.6 (11 Dec 2017 13:32)  HR: 67 (12 Dec 2017 05:51) (67 - 81)  BP: 152/85 (12 Dec 2017 05:51) (114/76 - 152/85)  BP(mean): --  RR: 16 (12 Dec 2017 05:51) (16 - 17)  SpO2: 95% (12 Dec 2017 05:51) (93% - 97%)  I&O's Detail    11 Dec 2017 07:01  -  12 Dec 2017 07:00  --------------------------------------------------------  IN:    Solution: 250 mL    TPN (Total Parenteral Nutrition): 1752 mL  Total IN: 2002 mL    OUT:    Drain: 150 mL  Total OUT: 150 mL    Total NET: 1852 mL              Physical Exam  General: NAD, alert, interactive, appears comfortable, morbidly obese  C/V: NSR  Pulm: Nonlabored breathing, no respiratory distress  Abd: 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; abdominal wound dressing in place and attached to suction  Extrem: WWP, no edema, SCDs in place        LABS:                        10.2   5.5   )-----------( 210      ( 10 Dec 2017 08:19 )             34.2     12-11    140  |  101  |  39<H>  ----------------------------<  90  4.4   |  28  |  0.59    Ca    10.0      11 Dec 2017 08:20  Phos  3.6     12-11  Mg     2.1     12-11            RADIOLOGY & ADDITIONAL STUDIES:

## 2017-12-13 LAB
ANION GAP SERPL CALC-SCNC: 8 MMOL/L — SIGNIFICANT CHANGE UP (ref 5–17)
BUN SERPL-MCNC: 36 MG/DL — HIGH (ref 7–23)
CALCIUM SERPL-MCNC: 9.6 MG/DL — SIGNIFICANT CHANGE UP (ref 8.4–10.5)
CHLORIDE SERPL-SCNC: 103 MMOL/L — SIGNIFICANT CHANGE UP (ref 96–108)
CO2 SERPL-SCNC: 28 MMOL/L — SIGNIFICANT CHANGE UP (ref 22–31)
CREAT SERPL-MCNC: 0.57 MG/DL — SIGNIFICANT CHANGE UP (ref 0.5–1.3)
GLUCOSE BLDC GLUCOMTR-MCNC: 108 MG/DL — HIGH (ref 70–99)
GLUCOSE BLDC GLUCOMTR-MCNC: 119 MG/DL — HIGH (ref 70–99)
GLUCOSE BLDC GLUCOMTR-MCNC: 119 MG/DL — HIGH (ref 70–99)
GLUCOSE BLDC GLUCOMTR-MCNC: 127 MG/DL — HIGH (ref 70–99)
GLUCOSE BLDC GLUCOMTR-MCNC: 77 MG/DL — SIGNIFICANT CHANGE UP (ref 70–99)
GLUCOSE SERPL-MCNC: 125 MG/DL — HIGH (ref 70–99)
POTASSIUM SERPL-MCNC: 4.1 MMOL/L — SIGNIFICANT CHANGE UP (ref 3.5–5.3)
POTASSIUM SERPL-SCNC: 4.1 MMOL/L — SIGNIFICANT CHANGE UP (ref 3.5–5.3)
SODIUM SERPL-SCNC: 139 MMOL/L — SIGNIFICANT CHANGE UP (ref 135–145)

## 2017-12-13 RX ORDER — ELECTROLYTE SOLUTION,INJ
1 VIAL (ML) INTRAVENOUS
Qty: 0 | Refills: 0 | Status: DISCONTINUED | OUTPATIENT
Start: 2017-12-13 | End: 2017-12-13

## 2017-12-13 RX ORDER — DIPHENHYDRAMINE HCL 50 MG
50 CAPSULE ORAL ONCE
Qty: 0 | Refills: 0 | Status: DISCONTINUED | OUTPATIENT
Start: 2017-12-13 | End: 2017-12-13

## 2017-12-13 RX ADMIN — LOSARTAN POTASSIUM 50 MILLIGRAM(S): 100 TABLET, FILM COATED ORAL at 05:14

## 2017-12-13 RX ADMIN — ESCITALOPRAM OXALATE 20 MILLIGRAM(S): 10 TABLET, FILM COATED ORAL at 21:11

## 2017-12-13 RX ADMIN — ENOXAPARIN SODIUM 40 MILLIGRAM(S): 100 INJECTION SUBCUTANEOUS at 18:22

## 2017-12-13 RX ADMIN — Medication 325 MILLIGRAM(S): at 18:32

## 2017-12-13 RX ADMIN — PANTOPRAZOLE SODIUM 40 MILLIGRAM(S): 20 TABLET, DELAYED RELEASE ORAL at 05:15

## 2017-12-13 RX ADMIN — Medication 325 MILLIGRAM(S): at 06:15

## 2017-12-13 RX ADMIN — OCTREOTIDE ACETATE 450 MICROGRAM(S): 200 INJECTION, SOLUTION INTRAVENOUS; SUBCUTANEOUS at 12:33

## 2017-12-13 RX ADMIN — PREGABALIN 1000 MICROGRAM(S): 225 CAPSULE ORAL at 12:32

## 2017-12-13 RX ADMIN — GABAPENTIN 100 MILLIGRAM(S): 400 CAPSULE ORAL at 18:21

## 2017-12-13 RX ADMIN — Medication 50 MILLIGRAM(S): at 21:11

## 2017-12-13 RX ADMIN — Medication 1 EACH: at 18:19

## 2017-12-13 RX ADMIN — Medication 250 MILLIGRAM(S): at 23:40

## 2017-12-13 RX ADMIN — Medication 325 MILLIGRAM(S): at 13:48

## 2017-12-13 RX ADMIN — Medication 5 MILLIGRAM(S): at 21:11

## 2017-12-13 RX ADMIN — Medication 10 MILLIGRAM(S): at 18:21

## 2017-12-13 RX ADMIN — Medication 50 MILLIGRAM(S): at 18:21

## 2017-12-13 RX ADMIN — Medication 250 MILLIGRAM(S): at 00:52

## 2017-12-13 RX ADMIN — Medication 50 MILLIGRAM(S): at 05:15

## 2017-12-13 RX ADMIN — NYSTATIN CREAM 1 APPLICATION(S): 100000 CREAM TOPICAL at 05:14

## 2017-12-13 RX ADMIN — Medication 325 MILLIGRAM(S): at 12:48

## 2017-12-13 RX ADMIN — Medication 325 MILLIGRAM(S): at 19:32

## 2017-12-13 RX ADMIN — ENOXAPARIN SODIUM 40 MILLIGRAM(S): 100 INJECTION SUBCUTANEOUS at 05:32

## 2017-12-13 RX ADMIN — GABAPENTIN 100 MILLIGRAM(S): 400 CAPSULE ORAL at 05:14

## 2017-12-13 RX ADMIN — Medication 50 MILLIGRAM(S): at 12:33

## 2017-12-13 RX ADMIN — Medication 1 APPLICATION(S): at 05:14

## 2017-12-13 RX ADMIN — Medication 10 MILLIGRAM(S): at 05:14

## 2017-12-13 RX ADMIN — Medication 250 MILLIGRAM(S): at 12:32

## 2017-12-13 RX ADMIN — NYSTATIN CREAM 1 APPLICATION(S): 100000 CREAM TOPICAL at 18:23

## 2017-12-13 RX ADMIN — Medication 325 MILLIGRAM(S): at 23:39

## 2017-12-13 RX ADMIN — CALCITRIOL 1 MICROGRAM(S): 0.5 CAPSULE ORAL at 12:33

## 2017-12-13 RX ADMIN — Medication 325 MILLIGRAM(S): at 05:14

## 2017-12-13 NOTE — PROGRESS NOTE ADULT - SUBJECTIVE AND OBJECTIVE BOX
On interval followup, the left int jugular catheter site is clean, dry, non-inflamed and non-tender. Afebrile. Notes and cultures are followed. The loose dressing  in need of change was coordinated with the nurse.

## 2017-12-13 NOTE — PROGRESS NOTE ADULT - SUBJECTIVE AND OBJECTIVE BOX
O/N: BRAYDEN, VSS  12/12: Per Dr. Brady patient must ambulate , Patient walking with PT OVERNIGHT EVENTS : BRAYDEN, VSS  12/12: Per Dr. Brady patient must ambulate , Patient walking with PT        STATUS POST:  7/24: SBR resection, fistula resection, revision of esophagogegunostomy drain through esophageal hiatus in R mediastinum  7/29: Ex-lap, SB anastamosis in BP limb breakdown with succus throughout abdomen  8/1: abd washout, drainage of abscess, biologic mesh placement, closure of abdominal wall, vac and retention suture  8/7: abd washout, mesh removal, frozen abd noted, LLQ CHECO replaced with benson drain  8/10: leak noted from previous anastamosis site on L side, mid abdomen malecot drain placed in enterotomy, JPs x 2 placed, necrotic fascia debrided, vicryl mesh sutured to fascia circumferentially, xeroform over mesh then vac dressing placed	          SUBJECTIVE: Patient denies any complaints   Flatus: [] YES [X] NO             Bowel Movement: [ ] YES [ X] NO  Pain (0-10):            Pain Control Adequate: [ X] YES [ ] NO  Nausea: [ ] YES [X ] NO            Vomiting: [ ] YES [X ] NO  Diarrhea: [ ] YES [X ] NO         Constipation: [ ] YES [X ] NO     Chest Pain: [ ] YES [ X] NO    SOB:  [ ] YES [X ] NO    enoxaparin Injectable 40 milliGRAM(s) SubCutaneous two times a day  heparin  flush 100 Units/mL Injectable 100 Unit(s) IV Push every other day  heparin  flush 100 Units/mL Injectable 100 Unit(s) IV Push every other day  losartan 50 milliGRAM(s) Oral daily  vancomycin  IVPB 1000 milliGRAM(s) IV Intermittent every 12 hours      Vital Signs Last 24 Hrs  T(C): 36.4 (13 Dec 2017 05:36), Max: 37.1 (12 Dec 2017 14:49)  T(F): 97.6 (13 Dec 2017 05:36), Max: 98.7 (12 Dec 2017 14:49)  HR: 65 (13 Dec 2017 05:36) (65 - 85)  BP: 146/85 (13 Dec 2017 05:36) (118/80 - 148/87)  BP(mean): --  RR: 18 (13 Dec 2017 05:36) (16 - 18)  SpO2: 94% (13 Dec 2017 05:36) (94% - 96%)  I&O's Detail    12 Dec 2017 07:01  -  13 Dec 2017 07:00  --------------------------------------------------------  IN:    Fat Emulsion 20%: 208 mL    Solution: 250 mL    TPN (Total Parenteral Nutrition): 1679 mL  Total IN: 2137 mL    OUT:    Drain: 175 mL  Total OUT: 175 mL    Total NET: 1962 mL          General: NAD, resting comfortably in bed  C/V: NSR  Pulm: Nonlabored breathing, no respiratory distress  Abd:  soft, NT/ND. Dressing  clean and dry. Wound has healthy granulation tissue observed  Extrem: WWP, no edema, SCDs in place        LABS:    12-12    140  |  103  |  35<H>  ----------------------------<  106<H>  4.2   |  27  |  0.57    Ca    9.8      12 Dec 2017 07:10  Phos  3.5     12-12  Mg     2.0     12-12

## 2017-12-13 NOTE — PROGRESS NOTE ADULT - SUBJECTIVE AND OBJECTIVE BOX
Psychiatry Brief Note    Attempted to see patient at bedside; she refused stating she had just gotten Benadryl and wanted to sleep.  Denied acute complaints.    Patient appears to have been refusing PT although participated yesterday.    Will reattempt to interview patient.  Patient continues to be followed by psychology intern for support.    Millie Hatfield MD  [facility] Psychiatry Attending

## 2017-12-14 LAB
ANION GAP SERPL CALC-SCNC: 9 MMOL/L — SIGNIFICANT CHANGE UP (ref 5–17)
BUN SERPL-MCNC: 33 MG/DL — HIGH (ref 7–23)
CALCIUM SERPL-MCNC: 10.3 MG/DL — SIGNIFICANT CHANGE UP (ref 8.4–10.5)
CHLORIDE SERPL-SCNC: 100 MMOL/L — SIGNIFICANT CHANGE UP (ref 96–108)
CO2 SERPL-SCNC: 29 MMOL/L — SIGNIFICANT CHANGE UP (ref 22–31)
CREAT SERPL-MCNC: 0.59 MG/DL — SIGNIFICANT CHANGE UP (ref 0.5–1.3)
GLUCOSE BLDC GLUCOMTR-MCNC: 111 MG/DL — HIGH (ref 70–99)
GLUCOSE BLDC GLUCOMTR-MCNC: 119 MG/DL — HIGH (ref 70–99)
GLUCOSE BLDC GLUCOMTR-MCNC: 145 MG/DL — HIGH (ref 70–99)
GLUCOSE SERPL-MCNC: 103 MG/DL — HIGH (ref 70–99)
MAGNESIUM SERPL-MCNC: 2.1 MG/DL — SIGNIFICANT CHANGE UP (ref 1.6–2.6)
PHOSPHATE SERPL-MCNC: 3.7 MG/DL — SIGNIFICANT CHANGE UP (ref 2.5–4.5)
POTASSIUM SERPL-MCNC: 4.2 MMOL/L — SIGNIFICANT CHANGE UP (ref 3.5–5.3)
POTASSIUM SERPL-SCNC: 4.2 MMOL/L — SIGNIFICANT CHANGE UP (ref 3.5–5.3)
SODIUM SERPL-SCNC: 138 MMOL/L — SIGNIFICANT CHANGE UP (ref 135–145)
VANCOMYCIN TROUGH SERPL-MCNC: 23.8 UG/ML — HIGH (ref 10–20)

## 2017-12-14 RX ORDER — ELECTROLYTE SOLUTION,INJ
1 VIAL (ML) INTRAVENOUS
Qty: 0 | Refills: 0 | Status: DISCONTINUED | OUTPATIENT
Start: 2017-12-14 | End: 2017-12-14

## 2017-12-14 RX ORDER — DIAZEPAM 5 MG
5 TABLET ORAL AT BEDTIME
Qty: 0 | Refills: 0 | Status: DISCONTINUED | OUTPATIENT
Start: 2017-12-14 | End: 2017-12-21

## 2017-12-14 RX ORDER — VANCOMYCIN HCL 1 G
750 VIAL (EA) INTRAVENOUS EVERY 12 HOURS
Qty: 0 | Refills: 0 | Status: DISCONTINUED | OUTPATIENT
Start: 2017-12-14 | End: 2017-12-25

## 2017-12-14 RX ORDER — I.V. FAT EMULSION 20 G/100ML
50 EMULSION INTRAVENOUS ONCE
Qty: 0 | Refills: 0 | Status: COMPLETED | OUTPATIENT
Start: 2017-12-14 | End: 2017-12-14

## 2017-12-14 RX ADMIN — PANTOPRAZOLE SODIUM 40 MILLIGRAM(S): 20 TABLET, DELAYED RELEASE ORAL at 05:27

## 2017-12-14 RX ADMIN — Medication 325 MILLIGRAM(S): at 00:40

## 2017-12-14 RX ADMIN — NYSTATIN CREAM 1 APPLICATION(S): 100000 CREAM TOPICAL at 18:55

## 2017-12-14 RX ADMIN — Medication 10 MILLIGRAM(S): at 19:05

## 2017-12-14 RX ADMIN — GABAPENTIN 100 MILLIGRAM(S): 400 CAPSULE ORAL at 19:04

## 2017-12-14 RX ADMIN — Medication 325 MILLIGRAM(S): at 21:32

## 2017-12-14 RX ADMIN — OCTREOTIDE ACETATE 450 MICROGRAM(S): 200 INJECTION, SOLUTION INTRAVENOUS; SUBCUTANEOUS at 12:25

## 2017-12-14 RX ADMIN — Medication 10 MILLIGRAM(S): at 05:28

## 2017-12-14 RX ADMIN — Medication 325 MILLIGRAM(S): at 13:24

## 2017-12-14 RX ADMIN — LOSARTAN POTASSIUM 50 MILLIGRAM(S): 100 TABLET, FILM COATED ORAL at 05:27

## 2017-12-14 RX ADMIN — Medication 325 MILLIGRAM(S): at 06:27

## 2017-12-14 RX ADMIN — Medication 325 MILLIGRAM(S): at 22:32

## 2017-12-14 RX ADMIN — ESCITALOPRAM OXALATE 20 MILLIGRAM(S): 10 TABLET, FILM COATED ORAL at 21:32

## 2017-12-14 RX ADMIN — ENOXAPARIN SODIUM 40 MILLIGRAM(S): 100 INJECTION SUBCUTANEOUS at 05:26

## 2017-12-14 RX ADMIN — NYSTATIN CREAM 1 APPLICATION(S): 100000 CREAM TOPICAL at 05:28

## 2017-12-14 RX ADMIN — Medication 50 MILLIGRAM(S): at 21:32

## 2017-12-14 RX ADMIN — Medication 50 MILLIGRAM(S): at 05:27

## 2017-12-14 RX ADMIN — Medication 50 MILLIGRAM(S): at 19:04

## 2017-12-14 RX ADMIN — Medication 5 MILLIGRAM(S): at 21:38

## 2017-12-14 RX ADMIN — GABAPENTIN 100 MILLIGRAM(S): 400 CAPSULE ORAL at 05:27

## 2017-12-14 RX ADMIN — Medication 1 APPLICATION(S): at 05:28

## 2017-12-14 RX ADMIN — Medication 325 MILLIGRAM(S): at 12:24

## 2017-12-14 RX ADMIN — Medication 50 MILLIGRAM(S): at 23:48

## 2017-12-14 RX ADMIN — Medication 1 EACH: at 19:03

## 2017-12-14 RX ADMIN — Medication 150 MILLIGRAM(S): at 11:55

## 2017-12-14 RX ADMIN — Medication 100 UNIT(S): at 12:25

## 2017-12-14 RX ADMIN — I.V. FAT EMULSION 20.83 GRAM(S): 20 EMULSION INTRAVENOUS at 21:32

## 2017-12-14 RX ADMIN — ENOXAPARIN SODIUM 40 MILLIGRAM(S): 100 INJECTION SUBCUTANEOUS at 19:05

## 2017-12-14 RX ADMIN — Medication 50 MILLIGRAM(S): at 12:24

## 2017-12-14 RX ADMIN — Medication 325 MILLIGRAM(S): at 05:27

## 2017-12-14 RX ADMIN — Medication 150 MILLIGRAM(S): at 21:32

## 2017-12-14 RX ADMIN — ONDANSETRON 4 MILLIGRAM(S): 8 TABLET, FILM COATED ORAL at 13:49

## 2017-12-14 NOTE — PROGRESS NOTE ADULT - SUBJECTIVE AND OBJECTIVE BOX
O/N: VSS. BRAYDEN. vanco trough with AM labs  12/13: Dressing changed , BRAYDEN , VSS O/N: VSS. BRAYDEN. vanco trough with AM labs  12/13: Dressing changed , BRAYDEN , VSS    SUBJECTIVE: Patient seen and examined bedside by surgery team. Pain well-controlled. Denies nausea, vomiting, chest pain, shortness of breath, fevers, or chills. Wound vac leaking. Will need to be changed today.        enoxaparin Injectable 40 milliGRAM(s) SubCutaneous two times a day  heparin  flush 100 Units/mL Injectable 100 Unit(s) IV Push every other day  heparin  flush 100 Units/mL Injectable 100 Unit(s) IV Push every other day  losartan 50 milliGRAM(s) Oral daily  vancomycin  IVPB 1000 milliGRAM(s) IV Intermittent every 12 hours      Vital Signs Last 24 Hrs  T(C): 36.7 (14 Dec 2017 05:56), Max: 36.7 (13 Dec 2017 14:48)  T(F): 98 (14 Dec 2017 05:56), Max: 98.1 (13 Dec 2017 20:20)  HR: 66 (14 Dec 2017 05:56) (66 - 70)  BP: 132/82 (14 Dec 2017 05:56) (124/82 - 132/82)  BP(mean): --  RR: 17 (14 Dec 2017 05:56) (16 - 17)  SpO2: 95% (14 Dec 2017 05:56) (94% - 95%)  I&O's Detail    13 Dec 2017 07:01  -  14 Dec 2017 07:00  --------------------------------------------------------  IN:    Oral Fluid: 120 mL    TPN (Total Parenteral Nutrition): 1752 mL  Total IN: 1872 mL    OUT:    Voided: 400 mL  Total OUT: 400 mL    Total NET: 1472 mL            Physical Exam  General: NAD, alert, interactive, appears comfortable, morbidly obese  C/V: NSR  Pulm: Nonlabored breathing, no respiratory distress  Abd: 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; abdominal wound dressing in place and attached to suction  Extrem: WWP, no edema, SCDs in place        LABS:    12-13    139  |  103  |  36<H>  ----------------------------<  125<H>  4.1   |  28  |  0.57    Ca    9.6      13 Dec 2017 07:08  Phos  3.5     12-12  Mg     2.0     12-12            RADIOLOGY & ADDITIONAL STUDIES:

## 2017-12-14 NOTE — CHART NOTE - NSCHARTNOTEFT_GEN_A_CORE
Admitting Diagnosis:   Patient is a 57y old  Female who presents with a chief complaint of enterocutaneous fistula (24 Jul 2017 14:15)      PAST MEDICAL & SURGICAL HISTORY:  Reflux  Obesity  DVT (deep venous thrombosis): 2013 neck  Diverticulitis  Hypertension  Elective surgery: abodominal wall surgery  dec 2016  Elective surgery: NOV 2016 gastric bypass revision  Gastric bypass status for obesity: gastric sleeve, 6/2012  S/P breast biopsy: 2011, left  S/P colon resection: 2011  S/P knee surgery: repair 2009, 2011  right; left  Umbilical hernia: 2000  S/P appendectomy: 1975  S/P tonsillectomy: 1967      Current Nutrition Order:  NPO except ice chips and water  >> TPN via central venous line:  1752 TV (73ml/hr); 355g D, 140g AA, no lipids today (1767kcal no lipid calories); total kcal w/ lipids: (2267 kcal, 2.7g/kg IBW pro, GIR 3.30)  Previously, pt was allowed by MD to have 1 clear liquid option at breakfast and dinner and 1 pureed option at lunch. Now NPO w/ ice chips and water    PO Intake: Good (%) [   ]  Fair (50-75%) [   ] Poor (<25%) [   ] N/A     GI Issues: No reported GI distress at this time.    Pain: No C/o pain.    Skin Integrity: sacrum un-stageable PU; sacral abrasion; abd wound--> NGtube placed w/ sponge through pouch into fistula for suction    Labs:   12-14    138  |  100  |  33<H>  ----------------------------<  103<H>  4.2   |  29  |  0.59    Ca    10.3      14 Dec 2017 07:01  Phos  3.7     12-14  Mg     2.1     12-14      CAPILLARY BLOOD GLUCOSE      POCT Blood Glucose.: 119 mg/dL (14 Dec 2017 05:24)  POCT Blood Glucose.: 119 mg/dL (13 Dec 2017 23:29)  POCT Blood Glucose.: 108 mg/dL (13 Dec 2017 17:37)  POCT Blood Glucose.: 77 mg/dL (13 Dec 2017 12:01)      Medications:  MEDICATIONS  (STANDING):  bismuth subsalicylate Liquid 30 milliLiter(s) Oral daily  calcitriol Injectable 1 MICROGram(s) IV Push <User Schedule>  collagenase Ointment 1 Application(s) Topical daily  cyanocobalamin Injectable 1000 MICROGram(s) SubCutaneous every 7 days  dextrose 5%. 1000 milliLiter(s) (50 mL/Hr) IV Continuous <Continuous>  dextrose 50% Injectable 25 Gram(s) IV Push once  dextrose 50% Injectable 12.5 Gram(s) IV Push once  diazepam    Tablet 5 milliGRAM(s) Oral at bedtime  diphenhydrAMINE   Capsule 50 milliGRAM(s) Oral every 6 hours  diphenhydrAMINE   Injectable 50 milliGRAM(s) IV Push at bedtime  enoxaparin Injectable 40 milliGRAM(s) SubCutaneous two times a day  escitalopram 20 milliGRAM(s) Oral daily  fat emulsion (Plant Based) 20% IVPB 50 Gram(s) IV Intermittent once  gabapentin 100 milliGRAM(s) Oral two times a day  heparin  flush 100 Units/mL Injectable 100 Unit(s) IV Push every other day  heparin  flush 100 Units/mL Injectable 100 Unit(s) IV Push every other day  insulin lispro (HumaLOG) corrective regimen sliding scale   SubCutaneous every 6 hours  lidocaine   Patch 1 Patch Transdermal daily  losartan 50 milliGRAM(s) Oral daily  nystatin Powder 1 Application(s) Topical two times a day  octreotide  Injectable 450 MICROGram(s) IV Push daily  oxybutynin 10 milliGRAM(s) Oral two times a day  pantoprazole  Injectable 40 milliGRAM(s) IV Push daily  Parenteral Nutrition - Adult 1 Each (73 mL/Hr) TPN Continuous <Continuous>  Parenteral Nutrition - Adult 1 Each (73 mL/Hr) TPN Continuous <Continuous>  sodium chloride 0.9% lock flush 20 milliLiter(s) IV Push once  vancomycin  IVPB 750 milliGRAM(s) IV Intermittent every 12 hours    MEDICATIONS  (PRN):  acetaminophen   Tablet. 325 milliGRAM(s) Oral every 4 hours PRN Moderate Pain (4 - 6)  ondansetron Injectable 4 milliGRAM(s) IV Push every 6 hours PRN Nausea and/or Vomiting  sodium chloride 0.9% lock flush 10 milliLiter(s) IV Push every 1 hour PRN After each medication administration  sodium chloride 0.9% lock flush 10 milliLiter(s) IV Push every 12 hours PRN Lumen of catheter NOT used      Weight:  Bed Scale:  151.8lb (12/12)  154.9lb (12/7)  155.9lb (12/5)  162.0lb (11/30)  161.5lb (11/13)  160.5lb (11/8)   Standing Scale:  NEW STANDING SCALE WT TODAY:   171.5lb (12/14)  167lb (11/13)  164lb (10/17)  219lb (8/1)    Weight Change:   03/2016: 89kg  02/2017: 74kg   07/2017: 76kg  11/2017: 76kg     >>Bed scale wt indicates wts are trending down.  Was able to obtain standing scale wt today w/ help of RN. Pt currently weighs 171.5lbs per standing scale indicating a 4.5lb wt gain since last month. Wound vac leaking during weigh-in.     Estimated energy needs:   Estimated energy needs using 52 kg IBW:  increased needs per wound and pressure ulcer healing. needs calculated based on TPN as primary route of nutrition.  30-35 kcal/kg (2617-2647 kcal).   1.8-2 g/kg ( g protein).  30-35 mL/kg (9494-0792 mL fluid).     Subjective:   Clear liquids d/c'd. Pt now NPO w/ ice chips and sips of water. pt. Pt seen resting in bed. w/ help of RN, was able to obtain a standing scale wt of 171.5lb. this indicates a 4.5lb wt gain since last month.  No apparent GI distress. Will follow wts to assess adjusting nutrient needs.  TPN bag infusing at 73ml/hr providing 355g Dex, 140g AA, no lipids today (1767 kcal, 2.7g pro/kg IBW pro, GIR 3.30-based on standing wt).   Please update weights weekly to trend adequacy of nutrient intake via TPN. Bed scale wt has been trending down ~5-10lbs. Please use standing scale for accuracy.     Previous Nutrition Diagnosis: Increased nutrient needs RT increased demand for nutrients AEB wound and pressure ulcer healing   Active [  X]  Resolved [   ]    If resolved, new PES: N/A    Goal: Pt to meet >75% EER via tolerated route    Recommendations:  1) Continue w/ current TPN order as tolerated and assess feasibility of other routes w/wound healing  2) Monitor triglycerides & adjust lipids per MD discretion  3) Please continue to take standing weight for trending purposes  4) Recommend MVI, OsCal, B12    Education: Understands meeting nutrient needs via TPN.    Risk Level: High [  X ] Moderate [   ] Low [   ]. Admitting Diagnosis:   Patient is a 57y old  Female who presents with a chief complaint of enterocutaneous fistula (24 Jul 2017 14:15)      PAST MEDICAL & SURGICAL HISTORY:  Reflux  Obesity  DVT (deep venous thrombosis): 2013 neck  Diverticulitis  Hypertension  Elective surgery: abodominal wall surgery  dec 2016  Elective surgery: NOV 2016 gastric bypass revision  Gastric bypass status for obesity: gastric sleeve, 6/2012  S/P breast biopsy: 2011, left  S/P colon resection: 2011  S/P knee surgery: repair 2009, 2011  right; left  Umbilical hernia: 2000  S/P appendectomy: 1975  S/P tonsillectomy: 1967      Current Nutrition Order:  NPO except ice chips and water  >> TPN via central venous line:  1752 TV (73ml/hr); 355g D, 140g AA, no lipids today (1767kcal no lipid calories); total kcal w/ lipids: (2267 kcal, 2.7g/kg IBW pro, GIR 3.16 based on new standing wt)  Previously, pt was allowed by MD to have 1 clear liquid option at breakfast and dinner and 1 pureed option at lunch. Now NPO w/ ice chips and water    PO Intake: Good (%) [   ]  Fair (50-75%) [   ] Poor (<25%) [   ] N/A     GI Issues: No reported GI distress at this time.    Pain: No C/o pain.    Skin Integrity: sacrum un-stageable PU; sacral abrasion; abd wound--> NGtube placed w/ sponge through pouch into fistula for suction    Labs:   12-14    138  |  100  |  33<H>  ----------------------------<  103<H>  4.2   |  29  |  0.59    Ca    10.3      14 Dec 2017 07:01  Phos  3.7     12-14  Mg     2.1     12-14      CAPILLARY BLOOD GLUCOSE      POCT Blood Glucose.: 119 mg/dL (14 Dec 2017 05:24)  POCT Blood Glucose.: 119 mg/dL (13 Dec 2017 23:29)  POCT Blood Glucose.: 108 mg/dL (13 Dec 2017 17:37)  POCT Blood Glucose.: 77 mg/dL (13 Dec 2017 12:01)      Medications:  MEDICATIONS  (STANDING):  bismuth subsalicylate Liquid 30 milliLiter(s) Oral daily  calcitriol Injectable 1 MICROGram(s) IV Push <User Schedule>  collagenase Ointment 1 Application(s) Topical daily  cyanocobalamin Injectable 1000 MICROGram(s) SubCutaneous every 7 days  dextrose 5%. 1000 milliLiter(s) (50 mL/Hr) IV Continuous <Continuous>  dextrose 50% Injectable 25 Gram(s) IV Push once  dextrose 50% Injectable 12.5 Gram(s) IV Push once  diazepam    Tablet 5 milliGRAM(s) Oral at bedtime  diphenhydrAMINE   Capsule 50 milliGRAM(s) Oral every 6 hours  diphenhydrAMINE   Injectable 50 milliGRAM(s) IV Push at bedtime  enoxaparin Injectable 40 milliGRAM(s) SubCutaneous two times a day  escitalopram 20 milliGRAM(s) Oral daily  fat emulsion (Plant Based) 20% IVPB 50 Gram(s) IV Intermittent once  gabapentin 100 milliGRAM(s) Oral two times a day  heparin  flush 100 Units/mL Injectable 100 Unit(s) IV Push every other day  heparin  flush 100 Units/mL Injectable 100 Unit(s) IV Push every other day  insulin lispro (HumaLOG) corrective regimen sliding scale   SubCutaneous every 6 hours  lidocaine   Patch 1 Patch Transdermal daily  losartan 50 milliGRAM(s) Oral daily  nystatin Powder 1 Application(s) Topical two times a day  octreotide  Injectable 450 MICROGram(s) IV Push daily  oxybutynin 10 milliGRAM(s) Oral two times a day  pantoprazole  Injectable 40 milliGRAM(s) IV Push daily  Parenteral Nutrition - Adult 1 Each (73 mL/Hr) TPN Continuous <Continuous>  Parenteral Nutrition - Adult 1 Each (73 mL/Hr) TPN Continuous <Continuous>  sodium chloride 0.9% lock flush 20 milliLiter(s) IV Push once  vancomycin  IVPB 750 milliGRAM(s) IV Intermittent every 12 hours    MEDICATIONS  (PRN):  acetaminophen   Tablet. 325 milliGRAM(s) Oral every 4 hours PRN Moderate Pain (4 - 6)  ondansetron Injectable 4 milliGRAM(s) IV Push every 6 hours PRN Nausea and/or Vomiting  sodium chloride 0.9% lock flush 10 milliLiter(s) IV Push every 1 hour PRN After each medication administration  sodium chloride 0.9% lock flush 10 milliLiter(s) IV Push every 12 hours PRN Lumen of catheter NOT used      Weight:  Bed Scale:  151.8lb (12/12)  154.9lb (12/7)  155.9lb (12/5)  162.0lb (11/30)  161.5lb (11/13)  160.5lb (11/8)   Standing Scale:  NEW STANDING SCALE WT TODAY:   171.5lb (12/14)  167lb (11/13)  164lb (10/17)  219lb (8/1)    Weight Change:   03/2016: 89kg  02/2017: 74kg   07/2017: 76kg  11/2017: 76kg     >>Bed scale wt indicates wts are trending down.  Was able to obtain standing scale wt today w/ help of RN. Pt currently weighs 171.5lbs per standing scale indicating a 4.5lb wt gain since last month. Wound vac leaking during weigh-in.     Estimated energy needs:   Estimated energy needs using 52 kg IBW:  increased needs per wound and pressure ulcer healing. needs calculated based on TPN as primary route of nutrition.  30-35 kcal/kg (0739-2189 kcal).   1.8-2 g/kg ( g protein).  30-35 mL/kg (0645-1740 mL fluid).     Subjective:   NPO w/ ice chips and sips of water. pt. Pt seen resting in bed.  With help of RN, was able to obtain a standing scale wt of 171.5lb. this indicates a 4.5lb wt gain since last month.  No apparent GI distress. Will follow wts to assess adjusting nutrient needs.  TPN bag infusing at 73ml/hr providing 355g Dex, 140g AA, no lipids today (1767 kcal, 2.7g pro/kg IBW pro, GIR 3.16-based on new standing wt).   Please update weights weekly to trend adequacy of nutrient intake via TPN. Bed scale wt has been trending down ~5-10lbs. Please use standing scale for accuracy.     Previous Nutrition Diagnosis: Increased nutrient needs RT increased demand for nutrients AEB wound and pressure ulcer healing   Active [  X]  Resolved [   ]    If resolved, new PES: N/A    Goal: Pt to meet >75% EER via tolerated route    Recommendations:  1) Continue w/ current TPN order as tolerated and assess feasibility of other routes w/wound healing  2) Monitor triglycerides & adjust lipids per MD discretion  3) Please continue to take standing weight for trending purposes  4) Recommend MVI, OsCal, B12    Education: Understands meeting nutrient needs via TPN.    Risk Level: High [  X ] Moderate [   ] Low [   ].

## 2017-12-15 LAB
ANION GAP SERPL CALC-SCNC: 7 MMOL/L — SIGNIFICANT CHANGE UP (ref 5–17)
BUN SERPL-MCNC: 38 MG/DL — HIGH (ref 7–23)
CALCIUM SERPL-MCNC: 9.7 MG/DL — SIGNIFICANT CHANGE UP (ref 8.4–10.5)
CHLORIDE SERPL-SCNC: 102 MMOL/L — SIGNIFICANT CHANGE UP (ref 96–108)
CO2 SERPL-SCNC: 28 MMOL/L — SIGNIFICANT CHANGE UP (ref 22–31)
CREAT SERPL-MCNC: 0.61 MG/DL — SIGNIFICANT CHANGE UP (ref 0.5–1.3)
GLUCOSE BLDC GLUCOMTR-MCNC: 100 MG/DL — HIGH (ref 70–99)
GLUCOSE BLDC GLUCOMTR-MCNC: 110 MG/DL — HIGH (ref 70–99)
GLUCOSE BLDC GLUCOMTR-MCNC: 111 MG/DL — HIGH (ref 70–99)
GLUCOSE BLDC GLUCOMTR-MCNC: 121 MG/DL — HIGH (ref 70–99)
GLUCOSE SERPL-MCNC: 134 MG/DL — HIGH (ref 70–99)
PHOSPHATE SERPL-MCNC: 3.4 MG/DL — SIGNIFICANT CHANGE UP (ref 2.5–4.5)
POTASSIUM SERPL-MCNC: 4.2 MMOL/L — SIGNIFICANT CHANGE UP (ref 3.5–5.3)
POTASSIUM SERPL-SCNC: 4.2 MMOL/L — SIGNIFICANT CHANGE UP (ref 3.5–5.3)
SODIUM SERPL-SCNC: 137 MMOL/L — SIGNIFICANT CHANGE UP (ref 135–145)

## 2017-12-15 RX ORDER — ELECTROLYTE SOLUTION,INJ
1 VIAL (ML) INTRAVENOUS
Qty: 0 | Refills: 0 | Status: DISCONTINUED | OUTPATIENT
Start: 2017-12-15 | End: 2017-12-15

## 2017-12-15 RX ADMIN — Medication 325 MILLIGRAM(S): at 06:25

## 2017-12-15 RX ADMIN — Medication 1 EACH: at 17:11

## 2017-12-15 RX ADMIN — GABAPENTIN 100 MILLIGRAM(S): 400 CAPSULE ORAL at 05:25

## 2017-12-15 RX ADMIN — ENOXAPARIN SODIUM 40 MILLIGRAM(S): 100 INJECTION SUBCUTANEOUS at 17:11

## 2017-12-15 RX ADMIN — GABAPENTIN 100 MILLIGRAM(S): 400 CAPSULE ORAL at 17:11

## 2017-12-15 RX ADMIN — LOSARTAN POTASSIUM 50 MILLIGRAM(S): 100 TABLET, FILM COATED ORAL at 05:25

## 2017-12-15 RX ADMIN — Medication 325 MILLIGRAM(S): at 17:12

## 2017-12-15 RX ADMIN — Medication 325 MILLIGRAM(S): at 05:25

## 2017-12-15 RX ADMIN — ESCITALOPRAM OXALATE 20 MILLIGRAM(S): 10 TABLET, FILM COATED ORAL at 22:01

## 2017-12-15 RX ADMIN — Medication 325 MILLIGRAM(S): at 22:01

## 2017-12-15 RX ADMIN — OCTREOTIDE ACETATE 450 MICROGRAM(S): 200 INJECTION, SOLUTION INTRAVENOUS; SUBCUTANEOUS at 11:47

## 2017-12-15 RX ADMIN — NYSTATIN CREAM 1 APPLICATION(S): 100000 CREAM TOPICAL at 05:25

## 2017-12-15 RX ADMIN — Medication 50 MILLIGRAM(S): at 05:25

## 2017-12-15 RX ADMIN — Medication 50 MILLIGRAM(S): at 11:46

## 2017-12-15 RX ADMIN — PANTOPRAZOLE SODIUM 40 MILLIGRAM(S): 20 TABLET, DELAYED RELEASE ORAL at 05:25

## 2017-12-15 RX ADMIN — Medication 5 MILLIGRAM(S): at 22:01

## 2017-12-15 RX ADMIN — CALCITRIOL 1 MICROGRAM(S): 0.5 CAPSULE ORAL at 11:47

## 2017-12-15 RX ADMIN — Medication 150 MILLIGRAM(S): at 10:25

## 2017-12-15 RX ADMIN — Medication 150 MILLIGRAM(S): at 22:01

## 2017-12-15 RX ADMIN — Medication 325 MILLIGRAM(S): at 17:42

## 2017-12-15 RX ADMIN — Medication 325 MILLIGRAM(S): at 23:00

## 2017-12-15 RX ADMIN — Medication 10 MILLIGRAM(S): at 05:44

## 2017-12-15 RX ADMIN — Medication 50 MILLIGRAM(S): at 17:11

## 2017-12-15 RX ADMIN — Medication 325 MILLIGRAM(S): at 10:55

## 2017-12-15 RX ADMIN — Medication 10 MILLIGRAM(S): at 17:11

## 2017-12-15 RX ADMIN — ONDANSETRON 4 MILLIGRAM(S): 8 TABLET, FILM COATED ORAL at 22:01

## 2017-12-15 RX ADMIN — Medication 1 APPLICATION(S): at 05:26

## 2017-12-15 RX ADMIN — ENOXAPARIN SODIUM 40 MILLIGRAM(S): 100 INJECTION SUBCUTANEOUS at 05:25

## 2017-12-15 RX ADMIN — Medication 50 MILLIGRAM(S): at 22:01

## 2017-12-15 RX ADMIN — Medication 325 MILLIGRAM(S): at 10:25

## 2017-12-15 RX ADMIN — NYSTATIN CREAM 1 APPLICATION(S): 100000 CREAM TOPICAL at 17:11

## 2017-12-15 NOTE — PROGRESS NOTE ADULT - SUBJECTIVE AND OBJECTIVE BOX
O/N: dressing reinforced, VSS. BRAYDEN  12/14: vanco trough 23.8; decreased vanco; O/N: dressing reinforced, VSS. BRAYDEN  12/14: vanco trough 23.8; decreased vanco;     SUBJECTIVE: Patient seen and examined bedside by surgery team. Pain well-controlled. Denies nausea, vomiting, chest pain, shortness of breath, fevers, or chills. Wound vac needs change today.        enoxaparin Injectable 40 milliGRAM(s) SubCutaneous two times a day  heparin  flush 100 Units/mL Injectable 100 Unit(s) IV Push every other day  heparin  flush 100 Units/mL Injectable 100 Unit(s) IV Push every other day  losartan 50 milliGRAM(s) Oral daily  vancomycin  IVPB 750 milliGRAM(s) IV Intermittent every 12 hours      Vital Signs Last 24 Hrs  T(C): 37.1 (15 Dec 2017 05:27), Max: 37.3 (14 Dec 2017 14:50)  T(F): 98.7 (15 Dec 2017 05:27), Max: 99.1 (14 Dec 2017 14:50)  HR: 71 (15 Dec 2017 05:27) (55 - 88)  BP: 138/86 (15 Dec 2017 05:27) (119/77 - 147/84)  BP(mean): --  RR: 17 (15 Dec 2017 05:27) (16 - 19)  SpO2: 95% (15 Dec 2017 05:27) (94% - 96%)  I&O's Detail    14 Dec 2017 07:01  -  15 Dec 2017 07:00  --------------------------------------------------------  IN:    Fat Emulsion 20%: 220 mL    Solution: 250 mL    TPN (Total Parenteral Nutrition): 1752 mL  Total IN: 2222 mL    OUT:    Voided: 1200 mL  Total OUT: 1200 mL    Total NET: 1022 mL            Physical Exam  General: NAD, alert, interactive, appears comfortable, morbidly obese  C/V: NSR  Pulm: Nonlabored breathing, no respiratory distress  Abd: 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; abdominal wound dressing in place and attached to suction; erythema in area of clear adhesive of wound dressing  Extrem: WWP, no edema, SCDs in place        LABS:    12-14    138  |  100  |  33<H>  ----------------------------<  103<H>  4.2   |  29  |  0.59    Ca    10.3      14 Dec 2017 07:01  Phos  3.7     12-14  Mg     2.1     12-14            RADIOLOGY & ADDITIONAL STUDIES:

## 2017-12-15 NOTE — PROGRESS NOTE ADULT - ASSESSMENT
57 F s/p  SBR, fistula resection, esophagojejunostomy revision w/ post-op course complicated by RTOR for distal anastomosis breakdown, ATN, acute respiratory failure, & septic shock, MRSA bacteremia which resolved. Currently for wound care management.     CLD/TPN   Pain/nausea control - only benadryl at midnight and dilaudid 0.25mg during PT session   ISS/OOB with PT daily  Vanco restarted (12/1 - stop date: 12/25), Nystatin powder  --- DC'd ABS: Vancomycin 750 Q12 (10/11-11/8) ( Daptomycin 11/22-12/1)   Benadryl for abdominal pruritus  SCDs, lovenox (therapeutic), OOBA  Lines :  L left subclavian line (10/4-12/2), L IJ (12/2--)  AM labs, weekly albumin, pre-albumin and triglyceride (every friday).  Wet to dry dressing in place

## 2017-12-16 LAB
ANION GAP SERPL CALC-SCNC: 7 MMOL/L — SIGNIFICANT CHANGE UP (ref 5–17)
BUN SERPL-MCNC: 38 MG/DL — HIGH (ref 7–23)
CALCIUM SERPL-MCNC: 10.1 MG/DL — SIGNIFICANT CHANGE UP (ref 8.4–10.5)
CHLORIDE SERPL-SCNC: 102 MMOL/L — SIGNIFICANT CHANGE UP (ref 96–108)
CO2 SERPL-SCNC: 29 MMOL/L — SIGNIFICANT CHANGE UP (ref 22–31)
CREAT SERPL-MCNC: 0.58 MG/DL — SIGNIFICANT CHANGE UP (ref 0.5–1.3)
GLUCOSE BLDC GLUCOMTR-MCNC: 101 MG/DL — HIGH (ref 70–99)
GLUCOSE BLDC GLUCOMTR-MCNC: 117 MG/DL — HIGH (ref 70–99)
GLUCOSE BLDC GLUCOMTR-MCNC: 119 MG/DL — HIGH (ref 70–99)
GLUCOSE BLDC GLUCOMTR-MCNC: 129 MG/DL — HIGH (ref 70–99)
GLUCOSE SERPL-MCNC: 115 MG/DL — HIGH (ref 70–99)
MAGNESIUM SERPL-MCNC: 2.1 MG/DL — SIGNIFICANT CHANGE UP (ref 1.6–2.6)
PHOSPHATE SERPL-MCNC: 3.6 MG/DL — SIGNIFICANT CHANGE UP (ref 2.5–4.5)
POTASSIUM SERPL-MCNC: 4.2 MMOL/L — SIGNIFICANT CHANGE UP (ref 3.5–5.3)
POTASSIUM SERPL-SCNC: 4.2 MMOL/L — SIGNIFICANT CHANGE UP (ref 3.5–5.3)
SODIUM SERPL-SCNC: 138 MMOL/L — SIGNIFICANT CHANGE UP (ref 135–145)

## 2017-12-16 RX ORDER — ELECTROLYTE SOLUTION,INJ
1 VIAL (ML) INTRAVENOUS
Qty: 0 | Refills: 0 | Status: DISCONTINUED | OUTPATIENT
Start: 2017-12-16 | End: 2017-12-16

## 2017-12-16 RX ADMIN — Medication 10 MILLIGRAM(S): at 06:49

## 2017-12-16 RX ADMIN — NYSTATIN CREAM 1 APPLICATION(S): 100000 CREAM TOPICAL at 17:40

## 2017-12-16 RX ADMIN — Medication 1 APPLICATION(S): at 06:48

## 2017-12-16 RX ADMIN — Medication 10 MILLIGRAM(S): at 17:40

## 2017-12-16 RX ADMIN — PANTOPRAZOLE SODIUM 40 MILLIGRAM(S): 20 TABLET, DELAYED RELEASE ORAL at 06:48

## 2017-12-16 RX ADMIN — Medication 50 MILLIGRAM(S): at 17:40

## 2017-12-16 RX ADMIN — Medication 325 MILLIGRAM(S): at 17:40

## 2017-12-16 RX ADMIN — Medication 50 MILLIGRAM(S): at 02:07

## 2017-12-16 RX ADMIN — GABAPENTIN 100 MILLIGRAM(S): 400 CAPSULE ORAL at 17:39

## 2017-12-16 RX ADMIN — Medication 50 MILLIGRAM(S): at 11:41

## 2017-12-16 RX ADMIN — Medication 325 MILLIGRAM(S): at 03:00

## 2017-12-16 RX ADMIN — ESCITALOPRAM OXALATE 20 MILLIGRAM(S): 10 TABLET, FILM COATED ORAL at 21:53

## 2017-12-16 RX ADMIN — Medication 325 MILLIGRAM(S): at 06:48

## 2017-12-16 RX ADMIN — NYSTATIN CREAM 1 APPLICATION(S): 100000 CREAM TOPICAL at 06:48

## 2017-12-16 RX ADMIN — GABAPENTIN 100 MILLIGRAM(S): 400 CAPSULE ORAL at 06:49

## 2017-12-16 RX ADMIN — Medication 50 MILLIGRAM(S): at 06:49

## 2017-12-16 RX ADMIN — ENOXAPARIN SODIUM 40 MILLIGRAM(S): 100 INJECTION SUBCUTANEOUS at 06:49

## 2017-12-16 RX ADMIN — Medication 150 MILLIGRAM(S): at 11:41

## 2017-12-16 RX ADMIN — Medication 325 MILLIGRAM(S): at 18:10

## 2017-12-16 RX ADMIN — Medication 100 UNIT(S): at 11:41

## 2017-12-16 RX ADMIN — OCTREOTIDE ACETATE 450 MICROGRAM(S): 200 INJECTION, SOLUTION INTRAVENOUS; SUBCUTANEOUS at 11:42

## 2017-12-16 RX ADMIN — Medication 5 MILLIGRAM(S): at 21:53

## 2017-12-16 RX ADMIN — Medication 325 MILLIGRAM(S): at 12:12

## 2017-12-16 RX ADMIN — Medication 150 MILLIGRAM(S): at 21:53

## 2017-12-16 RX ADMIN — ENOXAPARIN SODIUM 40 MILLIGRAM(S): 100 INJECTION SUBCUTANEOUS at 17:39

## 2017-12-16 RX ADMIN — Medication 325 MILLIGRAM(S): at 07:35

## 2017-12-16 RX ADMIN — LOSARTAN POTASSIUM 50 MILLIGRAM(S): 100 TABLET, FILM COATED ORAL at 06:49

## 2017-12-16 RX ADMIN — Medication 1 EACH: at 17:39

## 2017-12-16 RX ADMIN — Medication 50 MILLIGRAM(S): at 21:53

## 2017-12-16 RX ADMIN — Medication 325 MILLIGRAM(S): at 11:42

## 2017-12-16 RX ADMIN — Medication 325 MILLIGRAM(S): at 02:08

## 2017-12-16 NOTE — PROGRESS NOTE ADULT - SUBJECTIVE AND OBJECTIVE BOX
O/N: Afebrile, VSS. BRAYDEN  12/15: BRAYDEN; wound vac changed SUBJECTIVE: Patient seen and examined bedside by chief resident. No acute events overnight. Denies fever, chills, nausea, emesis, chest pain, dyspnea, abdominal pain.     Vital Signs Last 24 Hrs  T(C): 35.8 (16 Dec 2017 06:33), Max: 37.3 (15 Dec 2017 08:35)  T(F): 96.4 (16 Dec 2017 06:33), Max: 99.2 (15 Dec 2017 08:35)  HR: 71 (16 Dec 2017 06:33) (64 - 77)  BP: 164/107 (16 Dec 2017 06:33) (119/77 - 164/107)  BP(mean): --  RR: 16 (16 Dec 2017 06:33) (16 - 17)  SpO2: 93% (16 Dec 2017 06:33) (93% - 98%)  I&O's Detail    15 Dec 2017 07:01  -  16 Dec 2017 07:00  --------------------------------------------------------  IN:    Fat Emulsion 20%: 60 mL    Solution: 150 mL    TPN (Total Parenteral Nutrition): 1898 mL  Total IN: 2108 mL    OUT:    Drain: 400 mL    Voided: 1175 mL  Total OUT: 1575 mL    Total NET: 533 mL          General: NAD, resting comfortably in bed  Pulm: Nonlabored breathing, no respiratory distress  Abd: soft, NT, wound vac functioning        LABS:    12-15    137  |  102  |  38<H>  ----------------------------<  134<H>  4.2   |  28  |  0.61    Ca    9.7      15 Dec 2017 10:49  Phos  3.4     12-15            RADIOLOGY & ADDITIONAL STUDIES:

## 2017-12-16 NOTE — PROGRESS NOTE ADULT - ASSESSMENT
57 F s/p  SBR, fistula resection, esophagojejunostomy revision w/ post-op course complicated by RTOR for distal anastomosis breakdown, ATN, acute respiratory failure, & septic shock, MRSA bacteremia which resolved. Currently for wound care management.     NPO/TPN   Pain/nausea control - only benadryl at midnight and dilaudid 0.25mg during PT session   ISS/OOB with PT daily  Vanco restarted (12/1 - stop date: 12/25), Nystatin powder  Benadryl for abdominal pruritus  SCDs, lovenox (therapeutic), OOBA  AM labs, weekly albumin, pre-albumin and triglyceride (every friday).

## 2017-12-17 LAB
ANION GAP SERPL CALC-SCNC: 14 MMOL/L — SIGNIFICANT CHANGE UP (ref 5–17)
BUN SERPL-MCNC: 38 MG/DL — HIGH (ref 7–23)
CALCIUM SERPL-MCNC: 10 MG/DL — SIGNIFICANT CHANGE UP (ref 8.4–10.5)
CHLORIDE SERPL-SCNC: 103 MMOL/L — SIGNIFICANT CHANGE UP (ref 96–108)
CO2 SERPL-SCNC: 24 MMOL/L — SIGNIFICANT CHANGE UP (ref 22–31)
CREAT SERPL-MCNC: 0.62 MG/DL — SIGNIFICANT CHANGE UP (ref 0.5–1.3)
GLUCOSE BLDC GLUCOMTR-MCNC: 108 MG/DL — HIGH (ref 70–99)
GLUCOSE BLDC GLUCOMTR-MCNC: 132 MG/DL — HIGH (ref 70–99)
GLUCOSE BLDC GLUCOMTR-MCNC: 133 MG/DL — HIGH (ref 70–99)
GLUCOSE SERPL-MCNC: 127 MG/DL — HIGH (ref 70–99)
MAGNESIUM SERPL-MCNC: 2.1 MG/DL — SIGNIFICANT CHANGE UP (ref 1.6–2.6)
PHOSPHATE SERPL-MCNC: 3.2 MG/DL — SIGNIFICANT CHANGE UP (ref 2.5–4.5)
POTASSIUM SERPL-MCNC: 4.7 MMOL/L — SIGNIFICANT CHANGE UP (ref 3.5–5.3)
POTASSIUM SERPL-SCNC: 4.7 MMOL/L — SIGNIFICANT CHANGE UP (ref 3.5–5.3)
SODIUM SERPL-SCNC: 141 MMOL/L — SIGNIFICANT CHANGE UP (ref 135–145)
VANCOMYCIN TROUGH SERPL-MCNC: 19.4 UG/ML — SIGNIFICANT CHANGE UP (ref 10–20)

## 2017-12-17 RX ORDER — ELECTROLYTE SOLUTION,INJ
1 VIAL (ML) INTRAVENOUS
Qty: 0 | Refills: 0 | Status: DISCONTINUED | OUTPATIENT
Start: 2017-12-17 | End: 2017-12-17

## 2017-12-17 RX ORDER — I.V. FAT EMULSION 20 G/100ML
0.5 EMULSION INTRAVENOUS
Qty: 50 | Refills: 0 | Status: DISCONTINUED | OUTPATIENT
Start: 2017-12-17 | End: 2017-12-17

## 2017-12-17 RX ADMIN — ENOXAPARIN SODIUM 40 MILLIGRAM(S): 100 INJECTION SUBCUTANEOUS at 17:10

## 2017-12-17 RX ADMIN — ESCITALOPRAM OXALATE 20 MILLIGRAM(S): 10 TABLET, FILM COATED ORAL at 21:40

## 2017-12-17 RX ADMIN — Medication 150 MILLIGRAM(S): at 21:40

## 2017-12-17 RX ADMIN — Medication 50 MILLIGRAM(S): at 11:35

## 2017-12-17 RX ADMIN — Medication 325 MILLIGRAM(S): at 11:35

## 2017-12-17 RX ADMIN — LOSARTAN POTASSIUM 50 MILLIGRAM(S): 100 TABLET, FILM COATED ORAL at 06:58

## 2017-12-17 RX ADMIN — Medication 150 MILLIGRAM(S): at 11:33

## 2017-12-17 RX ADMIN — Medication 5 MILLIGRAM(S): at 21:40

## 2017-12-17 RX ADMIN — PANTOPRAZOLE SODIUM 40 MILLIGRAM(S): 20 TABLET, DELAYED RELEASE ORAL at 06:58

## 2017-12-17 RX ADMIN — I.V. FAT EMULSION 20.8 GM/KG/DAY: 20 EMULSION INTRAVENOUS at 22:01

## 2017-12-17 RX ADMIN — Medication 10 MILLIGRAM(S): at 17:10

## 2017-12-17 RX ADMIN — NYSTATIN CREAM 1 APPLICATION(S): 100000 CREAM TOPICAL at 17:11

## 2017-12-17 RX ADMIN — Medication 50 MILLIGRAM(S): at 02:33

## 2017-12-17 RX ADMIN — GABAPENTIN 100 MILLIGRAM(S): 400 CAPSULE ORAL at 17:10

## 2017-12-17 RX ADMIN — OCTREOTIDE ACETATE 450 MICROGRAM(S): 200 INJECTION, SOLUTION INTRAVENOUS; SUBCUTANEOUS at 11:35

## 2017-12-17 RX ADMIN — ENOXAPARIN SODIUM 40 MILLIGRAM(S): 100 INJECTION SUBCUTANEOUS at 06:58

## 2017-12-17 RX ADMIN — ONDANSETRON 4 MILLIGRAM(S): 8 TABLET, FILM COATED ORAL at 11:42

## 2017-12-17 RX ADMIN — Medication 325 MILLIGRAM(S): at 17:40

## 2017-12-17 RX ADMIN — Medication 1 APPLICATION(S): at 07:00

## 2017-12-17 RX ADMIN — Medication 10 MILLIGRAM(S): at 06:58

## 2017-12-17 RX ADMIN — Medication 325 MILLIGRAM(S): at 12:05

## 2017-12-17 RX ADMIN — Medication 50 MILLIGRAM(S): at 06:59

## 2017-12-17 RX ADMIN — Medication 325 MILLIGRAM(S): at 03:00

## 2017-12-17 RX ADMIN — Medication 325 MILLIGRAM(S): at 17:10

## 2017-12-17 RX ADMIN — Medication 50 MILLIGRAM(S): at 21:39

## 2017-12-17 RX ADMIN — Medication 325 MILLIGRAM(S): at 02:33

## 2017-12-17 RX ADMIN — Medication 1 EACH: at 17:10

## 2017-12-17 RX ADMIN — GABAPENTIN 100 MILLIGRAM(S): 400 CAPSULE ORAL at 06:58

## 2017-12-17 RX ADMIN — Medication 50 MILLIGRAM(S): at 17:10

## 2017-12-17 NOTE — PROGRESS NOTE ADULT - SUBJECTIVE AND OBJECTIVE BOX
o/n: BRAYDEN, VSS  12/16: BRAYDEN, VSS o/n: BRAYDEN, VSS  12/16: BRAYDEN, VSS    SUBJECTIVE: Patient seen and examined bedside by surgery team. Pt denies AEO. Pt denies n/v/f/sob/chest pain.     enoxaparin Injectable 40 milliGRAM(s) SubCutaneous two times a day  heparin  flush 100 Units/mL Injectable 100 Unit(s) IV Push every other day  heparin  flush 100 Units/mL Injectable 100 Unit(s) IV Push every other day  losartan 50 milliGRAM(s) Oral daily  vancomycin  IVPB 750 milliGRAM(s) IV Intermittent every 12 hours      Vital Signs Last 24 Hrs  T(C): 36.5 (17 Dec 2017 06:03), Max: 37.1 (16 Dec 2017 14:49)  T(F): 97.7 (17 Dec 2017 06:03), Max: 98.8 (16 Dec 2017 14:49)  HR: 70 (17 Dec 2017 06:03) (69 - 76)  BP: 184/76 (17 Dec 2017 06:03) (128/87 - 184/76)  BP(mean): --  RR: 16 (17 Dec 2017 06:03) (16 - 17)  SpO2: 95% (17 Dec 2017 06:03) (94% - 96%)  I&O's Detail    16 Dec 2017 07:01  -  17 Dec 2017 07:00  --------------------------------------------------------  IN:    Solution: 150 mL    TPN (Total Parenteral Nutrition): 1387 mL  Total IN: 1537 mL    OUT:    Voided: 400 mL  Total OUT: 400 mL    Total NET: 1137 mL            Physical Exam  General: NAD, alert, interactive, appears comfortable  C/V: NSR  Pulm: Nonlabored breathing, no respiratory distress  Abd: softly distended, NT/ND.  Extrem: WWP, no edema, SCDs in place        LABS:    12-16    138  |  102  |  38<H>  ----------------------------<  115<H>  4.2   |  29  |  0.58    Ca    10.1      16 Dec 2017 09:11  Phos  3.6     12-16  Mg     2.1     12-16            RADIOLOGY & ADDITIONAL STUDIES:

## 2017-12-18 LAB
ALBUMIN SERPL ELPH-MCNC: 3 G/DL — LOW (ref 3.3–5)
ALP SERPL-CCNC: 239 U/L — HIGH (ref 40–120)
ALT FLD-CCNC: 35 U/L — SIGNIFICANT CHANGE UP (ref 10–45)
ANION GAP SERPL CALC-SCNC: 11 MMOL/L — SIGNIFICANT CHANGE UP (ref 5–17)
AST SERPL-CCNC: 24 U/L — SIGNIFICANT CHANGE UP (ref 10–40)
BILIRUB SERPL-MCNC: 0.5 MG/DL — SIGNIFICANT CHANGE UP (ref 0.2–1.2)
BUN SERPL-MCNC: 42 MG/DL — HIGH (ref 7–23)
CALCIUM SERPL-MCNC: 10.1 MG/DL — SIGNIFICANT CHANGE UP (ref 8.4–10.5)
CHLORIDE SERPL-SCNC: 103 MMOL/L — SIGNIFICANT CHANGE UP (ref 96–108)
CO2 SERPL-SCNC: 27 MMOL/L — SIGNIFICANT CHANGE UP (ref 22–31)
CREAT SERPL-MCNC: 0.62 MG/DL — SIGNIFICANT CHANGE UP (ref 0.5–1.3)
GLUCOSE BLDC GLUCOMTR-MCNC: 111 MG/DL — HIGH (ref 70–99)
GLUCOSE BLDC GLUCOMTR-MCNC: 117 MG/DL — HIGH (ref 70–99)
GLUCOSE BLDC GLUCOMTR-MCNC: 117 MG/DL — HIGH (ref 70–99)
GLUCOSE SERPL-MCNC: 107 MG/DL — HIGH (ref 70–99)
HCT VFR BLD CALC: 38 % — SIGNIFICANT CHANGE UP (ref 34.5–45)
HGB BLD-MCNC: 11.5 G/DL — SIGNIFICANT CHANGE UP (ref 11.5–15.5)
MAGNESIUM SERPL-MCNC: 2.1 MG/DL — SIGNIFICANT CHANGE UP (ref 1.6–2.6)
MCHC RBC-ENTMCNC: 26.9 PG — LOW (ref 27–34)
MCHC RBC-ENTMCNC: 30.3 G/DL — LOW (ref 32–36)
MCV RBC AUTO: 89 FL — SIGNIFICANT CHANGE UP (ref 80–100)
PHOSPHATE SERPL-MCNC: 3.4 MG/DL — SIGNIFICANT CHANGE UP (ref 2.5–4.5)
PLATELET # BLD AUTO: 221 K/UL — SIGNIFICANT CHANGE UP (ref 150–400)
POTASSIUM SERPL-MCNC: 4.2 MMOL/L — SIGNIFICANT CHANGE UP (ref 3.5–5.3)
POTASSIUM SERPL-SCNC: 4.2 MMOL/L — SIGNIFICANT CHANGE UP (ref 3.5–5.3)
PROT SERPL-MCNC: 7.1 G/DL — SIGNIFICANT CHANGE UP (ref 6–8.3)
RBC # BLD: 4.27 M/UL — SIGNIFICANT CHANGE UP (ref 3.8–5.2)
RBC # FLD: 17 % — HIGH (ref 10.3–16.9)
SODIUM SERPL-SCNC: 141 MMOL/L — SIGNIFICANT CHANGE UP (ref 135–145)
WBC # BLD: 5.3 K/UL — SIGNIFICANT CHANGE UP (ref 3.8–10.5)
WBC # FLD AUTO: 5.3 K/UL — SIGNIFICANT CHANGE UP (ref 3.8–10.5)

## 2017-12-18 RX ORDER — ELECTROLYTE SOLUTION,INJ
1 VIAL (ML) INTRAVENOUS
Qty: 0 | Refills: 0 | Status: DISCONTINUED | OUTPATIENT
Start: 2017-12-18 | End: 2017-12-18

## 2017-12-18 RX ORDER — I.V. FAT EMULSION 20 G/100ML
0.5 EMULSION INTRAVENOUS
Qty: 50 | Refills: 0 | Status: DISCONTINUED | OUTPATIENT
Start: 2017-12-18 | End: 2017-12-18

## 2017-12-18 RX ADMIN — Medication 50 MILLIGRAM(S): at 18:39

## 2017-12-18 RX ADMIN — ENOXAPARIN SODIUM 40 MILLIGRAM(S): 100 INJECTION SUBCUTANEOUS at 18:39

## 2017-12-18 RX ADMIN — OCTREOTIDE ACETATE 450 MICROGRAM(S): 200 INJECTION, SOLUTION INTRAVENOUS; SUBCUTANEOUS at 12:06

## 2017-12-18 RX ADMIN — Medication 1 APPLICATION(S): at 06:27

## 2017-12-18 RX ADMIN — Medication 5 MILLIGRAM(S): at 21:51

## 2017-12-18 RX ADMIN — Medication 325 MILLIGRAM(S): at 21:50

## 2017-12-18 RX ADMIN — Medication 10 MILLIGRAM(S): at 06:27

## 2017-12-18 RX ADMIN — Medication 50 MILLIGRAM(S): at 12:06

## 2017-12-18 RX ADMIN — Medication 50 MILLIGRAM(S): at 21:50

## 2017-12-18 RX ADMIN — NYSTATIN CREAM 1 APPLICATION(S): 100000 CREAM TOPICAL at 06:27

## 2017-12-18 RX ADMIN — Medication 325 MILLIGRAM(S): at 13:06

## 2017-12-18 RX ADMIN — NYSTATIN CREAM 1 APPLICATION(S): 100000 CREAM TOPICAL at 18:40

## 2017-12-18 RX ADMIN — GABAPENTIN 100 MILLIGRAM(S): 400 CAPSULE ORAL at 18:39

## 2017-12-18 RX ADMIN — CALCITRIOL 1 MICROGRAM(S): 0.5 CAPSULE ORAL at 12:08

## 2017-12-18 RX ADMIN — LOSARTAN POTASSIUM 50 MILLIGRAM(S): 100 TABLET, FILM COATED ORAL at 06:27

## 2017-12-18 RX ADMIN — Medication 150 MILLIGRAM(S): at 10:41

## 2017-12-18 RX ADMIN — Medication 1 EACH: at 18:41

## 2017-12-18 RX ADMIN — Medication 100 UNIT(S): at 12:05

## 2017-12-18 RX ADMIN — GABAPENTIN 100 MILLIGRAM(S): 400 CAPSULE ORAL at 06:27

## 2017-12-18 RX ADMIN — Medication 10 MILLIGRAM(S): at 18:39

## 2017-12-18 RX ADMIN — Medication 150 MILLIGRAM(S): at 22:38

## 2017-12-18 RX ADMIN — Medication 325 MILLIGRAM(S): at 22:50

## 2017-12-18 RX ADMIN — Medication 50 MILLIGRAM(S): at 06:28

## 2017-12-18 RX ADMIN — ENOXAPARIN SODIUM 40 MILLIGRAM(S): 100 INJECTION SUBCUTANEOUS at 06:28

## 2017-12-18 RX ADMIN — Medication 325 MILLIGRAM(S): at 12:06

## 2017-12-18 RX ADMIN — PANTOPRAZOLE SODIUM 40 MILLIGRAM(S): 20 TABLET, DELAYED RELEASE ORAL at 06:32

## 2017-12-18 RX ADMIN — ESCITALOPRAM OXALATE 20 MILLIGRAM(S): 10 TABLET, FILM COATED ORAL at 21:51

## 2017-12-18 NOTE — PROGRESS NOTE ADULT - SUBJECTIVE AND OBJECTIVE BOX
Patient looks real good  her vss  and walking and moving around  want to see with Dr Ruiz  and determine plan for potential reconstruction  may consider going to just bag to see if controls if stays npo

## 2017-12-18 NOTE — PROGRESS NOTE ADULT - SUBJECTIVE AND OBJECTIVE BOX
O/N: VSS. BRAYDEN  12/17 ARMANDO OWEN O/N: VSS. BRAYDEN  12/17 BRAYDEN, ARMANDO      SUBJECTIVE: Patient seen and examined bedside by surgery team. Pain well-controlled. Denies nausea, vomiting, chest pain, shortness of breath, fevers, or chills. Abdominal wound dressing not leaking and intact. But may need changing today.        enoxaparin Injectable 40 milliGRAM(s) SubCutaneous two times a day  heparin  flush 100 Units/mL Injectable 100 Unit(s) IV Push every other day  heparin  flush 100 Units/mL Injectable 100 Unit(s) IV Push every other day  losartan 50 milliGRAM(s) Oral daily  vancomycin  IVPB 750 milliGRAM(s) IV Intermittent every 12 hours      Vital Signs Last 24 Hrs  T(C): 36.2 (18 Dec 2017 06:10), Max: 36.7 (17 Dec 2017 17:39)  T(F): 97.1 (18 Dec 2017 06:10), Max: 98 (17 Dec 2017 17:39)  HR: 66 (18 Dec 2017 06:10) (66 - 75)  BP: 110/71 (18 Dec 2017 06:10) (107/70 - 135/84)  BP(mean): --  RR: 16 (18 Dec 2017 06:10) (16 - 16)  SpO2: 96% (18 Dec 2017 06:10) (94% - 96%)  I&O's Detail    17 Dec 2017 07:01  -  18 Dec 2017 07:00  --------------------------------------------------------  IN:    fat emulsion (Plant Based) 20% Infusion: 166.4 mL    Solution: 150 mL    TPN (Total Parenteral Nutrition): 1606 mL  Total IN: 1922.4 mL    OUT:    Drain: 500 mL  Total OUT: 500 mL    Total NET: 1422.4 mL          Physical Exam  General: NAD, alert, interactive, appears comfortable, morbidly obese  C/V: NSR  Pulm: Nonlabored breathing, no respiratory distress  Abd: 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

## 2017-12-19 LAB
ANION GAP SERPL CALC-SCNC: 10 MMOL/L — SIGNIFICANT CHANGE UP (ref 5–17)
BUN SERPL-MCNC: 37 MG/DL — HIGH (ref 7–23)
CALCIUM SERPL-MCNC: 9.7 MG/DL — SIGNIFICANT CHANGE UP (ref 8.4–10.5)
CHLORIDE SERPL-SCNC: 102 MMOL/L — SIGNIFICANT CHANGE UP (ref 96–108)
CO2 SERPL-SCNC: 29 MMOL/L — SIGNIFICANT CHANGE UP (ref 22–31)
CREAT SERPL-MCNC: 0.55 MG/DL — SIGNIFICANT CHANGE UP (ref 0.5–1.3)
GLUCOSE BLDC GLUCOMTR-MCNC: 107 MG/DL — HIGH (ref 70–99)
GLUCOSE BLDC GLUCOMTR-MCNC: 115 MG/DL — HIGH (ref 70–99)
GLUCOSE BLDC GLUCOMTR-MCNC: 118 MG/DL — HIGH (ref 70–99)
GLUCOSE BLDC GLUCOMTR-MCNC: 125 MG/DL — HIGH (ref 70–99)
GLUCOSE BLDC GLUCOMTR-MCNC: 95 MG/DL — SIGNIFICANT CHANGE UP (ref 70–99)
GLUCOSE SERPL-MCNC: 126 MG/DL — HIGH (ref 70–99)
POTASSIUM SERPL-MCNC: 3.7 MMOL/L — SIGNIFICANT CHANGE UP (ref 3.5–5.3)
POTASSIUM SERPL-SCNC: 3.7 MMOL/L — SIGNIFICANT CHANGE UP (ref 3.5–5.3)
SODIUM SERPL-SCNC: 141 MMOL/L — SIGNIFICANT CHANGE UP (ref 135–145)

## 2017-12-19 RX ORDER — ELECTROLYTE SOLUTION,INJ
1 VIAL (ML) INTRAVENOUS
Qty: 0 | Refills: 0 | Status: DISCONTINUED | OUTPATIENT
Start: 2017-12-19 | End: 2017-12-19

## 2017-12-19 RX ORDER — POTASSIUM CHLORIDE 20 MEQ
10 PACKET (EA) ORAL
Qty: 0 | Refills: 0 | Status: COMPLETED | OUTPATIENT
Start: 2017-12-19 | End: 2017-12-19

## 2017-12-19 RX ORDER — I.V. FAT EMULSION 20 G/100ML
0.5 EMULSION INTRAVENOUS
Qty: 50 | Refills: 0 | Status: DISCONTINUED | OUTPATIENT
Start: 2017-12-19 | End: 2017-12-19

## 2017-12-19 RX ADMIN — ESCITALOPRAM OXALATE 20 MILLIGRAM(S): 10 TABLET, FILM COATED ORAL at 21:29

## 2017-12-19 RX ADMIN — Medication 1 APPLICATION(S): at 06:23

## 2017-12-19 RX ADMIN — Medication 10 MILLIGRAM(S): at 17:30

## 2017-12-19 RX ADMIN — Medication 10 MILLIGRAM(S): at 06:22

## 2017-12-19 RX ADMIN — Medication 325 MILLIGRAM(S): at 04:32

## 2017-12-19 RX ADMIN — Medication 1 EACH: at 17:30

## 2017-12-19 RX ADMIN — Medication 325 MILLIGRAM(S): at 12:00

## 2017-12-19 RX ADMIN — LOSARTAN POTASSIUM 50 MILLIGRAM(S): 100 TABLET, FILM COATED ORAL at 06:22

## 2017-12-19 RX ADMIN — Medication 50 MILLIGRAM(S): at 17:30

## 2017-12-19 RX ADMIN — ENOXAPARIN SODIUM 40 MILLIGRAM(S): 100 INJECTION SUBCUTANEOUS at 17:30

## 2017-12-19 RX ADMIN — Medication 50 MILLIGRAM(S): at 11:30

## 2017-12-19 RX ADMIN — NYSTATIN CREAM 1 APPLICATION(S): 100000 CREAM TOPICAL at 17:34

## 2017-12-19 RX ADMIN — ENOXAPARIN SODIUM 40 MILLIGRAM(S): 100 INJECTION SUBCUTANEOUS at 06:22

## 2017-12-19 RX ADMIN — Medication 150 MILLIGRAM(S): at 21:28

## 2017-12-19 RX ADMIN — GABAPENTIN 100 MILLIGRAM(S): 400 CAPSULE ORAL at 06:22

## 2017-12-19 RX ADMIN — OCTREOTIDE ACETATE 450 MICROGRAM(S): 200 INJECTION, SOLUTION INTRAVENOUS; SUBCUTANEOUS at 11:30

## 2017-12-19 RX ADMIN — Medication 325 MILLIGRAM(S): at 05:23

## 2017-12-19 RX ADMIN — Medication 50 MILLIGRAM(S): at 21:28

## 2017-12-19 RX ADMIN — Medication 325 MILLIGRAM(S): at 17:30

## 2017-12-19 RX ADMIN — Medication 5 MILLIGRAM(S): at 21:29

## 2017-12-19 RX ADMIN — Medication 325 MILLIGRAM(S): at 18:00

## 2017-12-19 RX ADMIN — Medication 325 MILLIGRAM(S): at 21:29

## 2017-12-19 RX ADMIN — Medication 100 MILLIEQUIVALENT(S): at 15:11

## 2017-12-19 RX ADMIN — PANTOPRAZOLE SODIUM 40 MILLIGRAM(S): 20 TABLET, DELAYED RELEASE ORAL at 06:22

## 2017-12-19 RX ADMIN — Medication 325 MILLIGRAM(S): at 22:00

## 2017-12-19 RX ADMIN — Medication 150 MILLIGRAM(S): at 10:19

## 2017-12-19 RX ADMIN — NYSTATIN CREAM 1 APPLICATION(S): 100000 CREAM TOPICAL at 06:22

## 2017-12-19 RX ADMIN — Medication 100 MILLIEQUIVALENT(S): at 16:12

## 2017-12-19 RX ADMIN — GABAPENTIN 100 MILLIGRAM(S): 400 CAPSULE ORAL at 17:30

## 2017-12-19 RX ADMIN — Medication 50 MILLIGRAM(S): at 04:32

## 2017-12-19 RX ADMIN — Medication 100 MILLIEQUIVALENT(S): at 16:46

## 2017-12-19 RX ADMIN — Medication 325 MILLIGRAM(S): at 11:30

## 2017-12-19 RX ADMIN — I.V. FAT EMULSION 20.8 GM/KG/DAY: 20 EMULSION INTRAVENOUS at 21:28

## 2017-12-19 NOTE — PROGRESS NOTE ADULT - SUBJECTIVE AND OBJECTIVE BOX
O/N: BRAYDEN, VSS  12/18: wound vac changed; OVERNIGHT EVENTS: BRAYDEN, VSS  12/18: BRAYDEN , VSS     SUBJECTIVE: Patient examined bedside with attending.  Flatus: [X] YES [] NO             Bowel Movement: [ X] YES [ ] NO  Pain (0-10):            Pain Control Adequate: [ X] YES [ ] NO  Nausea: [ ] YES [X] NO            Vomiting: [ ] YES [ X] NO  Diarrhea: [ ] YES [ X] NO         Constipation: [ ] YES [ X] NO     Chest Pain: [ ] YES [ X] NO    SOB:  [ ] YES [X ] NO    enoxaparin Injectable 40 milliGRAM(s) SubCutaneous two times a day  heparin  flush 100 Units/mL Injectable 100 Unit(s) IV Push every other day  heparin  flush 100 Units/mL Injectable 100 Unit(s) IV Push every other day  losartan 50 milliGRAM(s) Oral daily  vancomycin  IVPB 750 milliGRAM(s) IV Intermittent every 12 hours      Vital Signs Last 24 Hrs  T(C): 36.1 (19 Dec 2017 05:49), Max: 37.3 (18 Dec 2017 16:48)  T(F): 96.9 (19 Dec 2017 05:49), Max: 99.1 (18 Dec 2017 16:48)  HR: 70 (19 Dec 2017 05:49) (64 - 81)  BP: 145/85 (19 Dec 2017 05:49) (112/74 - 145/85)  BP(mean): --  RR: 16 (19 Dec 2017 05:49) (16 - 19)  SpO2: 94% (19 Dec 2017 05:49) (94% - 98%)  I&O's Detail    18 Dec 2017 07:01  -  19 Dec 2017 07:00  --------------------------------------------------------  IN:    fat emulsion (Plant Based) 20% Infusion: 83.2 mL    Solution: 300 mL    TPN (Total Parenteral Nutrition): 1679 mL  Total IN: 2062.2 mL    OUT:    Drain: 250 mL  Total OUT: 250 mL    Total NET: 1812.2 mL          General: NAD, resting comfortably in bed  C/V: NSR  Pulm: Nonlabored breathing, no respiratory distress  Abd: soft, NT/ND. Wound pink and granulation tissue present    Extrem: WWP, no edema, SCDs in place  :      LABS:                        11.5   5.3   )-----------( 221      ( 18 Dec 2017 07:21 )             38.0     12-18    141  |  103  |  42<H>  ----------------------------<  107<H>  4.2   |  27  |  0.62    Ca    10.1      18 Dec 2017 07:21  Phos  3.4     12-18  Mg     2.1     12-18    TPro  7.1  /  Alb  3.0<L>  /  TBili  0.5  /  DBili  x   /  AST  24  /  ALT  35  /  AlkPhos  239<H>  12-18          RADIOLOGY & ADDITIONAL STUDIES: OVERNIGHT EVENTS: BRAYDEN, VSS  12/18: BRAYDEN , VSS     SUBJECTIVE: Patient examined bedside with attending.  Flatus: [X] YES [] NO             Bowel Movement: [ X] YES [ ] NO  Pain (0-10):            Pain Control Adequate: [ X] YES [ ] NO  Nausea: [ ] YES [X] NO            Vomiting: [ ] YES [ X] NO  Diarrhea: [ ] YES [ X] NO         Constipation: [ ] YES [ X] NO     Chest Pain: [ ] YES [ X] NO    SOB:  [ ] YES [X ] NO    enoxaparin Injectable 40 milliGRAM(s) SubCutaneous two times a day  heparin  flush 100 Units/mL Injectable 100 Unit(s) IV Push every other day  heparin  flush 100 Units/mL Injectable 100 Unit(s) IV Push every other day  losartan 50 milliGRAM(s) Oral daily  vancomycin  IVPB 750 milliGRAM(s) IV Intermittent every 12 hours      Vital Signs Last 24 Hrs  T(C): 36.1 (19 Dec 2017 05:49), Max: 37.3 (18 Dec 2017 16:48)  T(F): 96.9 (19 Dec 2017 05:49), Max: 99.1 (18 Dec 2017 16:48)  HR: 70 (19 Dec 2017 05:49) (64 - 81)  BP: 145/85 (19 Dec 2017 05:49) (112/74 - 145/85)  BP(mean): --  RR: 16 (19 Dec 2017 05:49) (16 - 19)  SpO2: 94% (19 Dec 2017 05:49) (94% - 98%)  I&O's Detail    18 Dec 2017 07:01  -  19 Dec 2017 07:00  --------------------------------------------------------  IN:    fat emulsion (Plant Based) 20% Infusion: 83.2 mL    Solution: 300 mL    TPN (Total Parenteral Nutrition): 1679 mL  Total IN: 2062.2 mL    OUT:    Drain: 250 mL  Total OUT: 250 mL    Total NET: 1812.2 mL          General: NAD, resting comfortably in bed  C/V: NSR  Pulm: Nonlabored breathing, no respiratory distress  Abd: soft, NT/ND. Wound pink and granulation tissue present; abdominal lesion 10 cm x 8 cm  Extrem: WWP, no edema, SCDs in place  :      LABS:                        11.5   5.3   )-----------( 221      ( 18 Dec 2017 07:21 )             38.0     12-18    141  |  103  |  42<H>  ----------------------------<  107<H>  4.2   |  27  |  0.62    Ca    10.1      18 Dec 2017 07:21  Phos  3.4     12-18  Mg     2.1     12-18    TPro  7.1  /  Alb  3.0<L>  /  TBili  0.5  /  DBili  x   /  AST  24  /  ALT  35  /  AlkPhos  239<H>  12-18          RADIOLOGY & ADDITIONAL STUDIES:

## 2017-12-19 NOTE — STUDENT SIGN OFF DOCUMENT - DOCUMENTS STUDENTS ARE SIGNED OFF ON
Plan of Care
Plan of Care/Assessment and Intervention
Assessment and Intervention/Plan of Care
Assessment and Intervention/Plan of Care
Plan of Care
Plan of Care
Plan of Care/Assessment and Intervention

## 2017-12-19 NOTE — PROGRESS NOTE ADULT - ASSESSMENT
57 F s/p  SBR, fistula resection, esophagojejunostomy revision w/ post-op course complicated by RTOR for distal anastomosis breakdown, ATN, acute respiratory failure, & septic shock, MRSA bacteremia which resolved. Currently for wound care management.     CLD/TPN   Pain/nausea control - only benadryl at midnight and dilaudid 0.25mg during PT session   ISS/OOB with PT daily  Vanco restarted (12/1 - stop date: 12/25), Nystatin powder  --- DC'd ABS: Vancomycin 750 Q12 (10/11-11/8) ( Daptomycin 11/22-12/1)   Benadryl for abdominal pruritus  SCDs, lovenox (therapeutic), OOBA  Lines :  L left subclavian line (10/4-12/2), L IJ (12/2--)  AM labs, weekly albumin, pre-albumin and triglyceride (every friday).  Wet to dry dressing in place  NGT in fistula

## 2017-12-19 NOTE — CHART NOTE - NSCHARTNOTEFT_GEN_A_CORE
Admitting Diagnosis:   Patient is a 57y old  Female who presents with a chief complaint of enterocutaneous fistula (24 Jul 2017 14:15)      PAST MEDICAL & SURGICAL HISTORY:  Reflux  Obesity  DVT (deep venous thrombosis): 2013 neck  Diverticulitis  Hypertension  Elective surgery: abodominal wall surgery  dec 2016  Elective surgery: NOV 2016 gastric bypass revision  Gastric bypass status for obesity: gastric sleeve, 6/2012  S/P breast biopsy: 2011, left  S/P colon resection: 2011  S/P knee surgery: repair 2009, 2011  right; left  Umbilical hernia: 2000  S/P appendectomy: 1975  S/P tonsillectomy: 1967    Current Nutrition Order:  NPO except ice chips and water  >> TPN via central venous line:  1752 TV (73ml/hr); 355g D, 140g AA, 50g 20% lipids (2267kcal calories): (2267 kcal, 2.7g/kg IBW pro, GIR 3.16 based on new standing wt)    PO Intake: Good (%) [   ]  Fair (50-75%) [   ] Poor (<25%) [   ] N/A     GI Issues: No reported GI distress at this time.    Pain: No C/o pain.    Skin Integrity: sacrum un-stageable PU; sacral abrasion; abd wound--> NGtube placed w/ sponge through pouch into fistula for suction  Labs:   12-18    141  |  103  |  42<H>  ----------------------------<  107<H>  4.2   |  27  |  0.62    Ca    10.1      18 Dec 2017 07:21  Phos  3.4     12-18  Mg     2.1     12-18    TPro  7.1  /  Alb  3.0<L>  /  TBili  0.5  /  DBili  x   /  AST  24  /  ALT  35  /  AlkPhos  239<H>  12-18    CAPILLARY BLOOD GLUCOSE      POCT Blood Glucose.: 107 mg/dL (19 Dec 2017 05:47)  POCT Blood Glucose.: 125 mg/dL (19 Dec 2017 00:09)  POCT Blood Glucose.: 111 mg/dL (18 Dec 2017 16:44)  POCT Blood Glucose.: 117 mg/dL (18 Dec 2017 13:09)      Medications:  MEDICATIONS  (STANDING):  bismuth subsalicylate Liquid 30 milliLiter(s) Oral daily  calcitriol Injectable 1 MICROGram(s) IV Push <User Schedule>  collagenase Ointment 1 Application(s) Topical daily  cyanocobalamin Injectable 1000 MICROGram(s) SubCutaneous every 7 days  dextrose 5%. 1000 milliLiter(s) (50 mL/Hr) IV Continuous <Continuous>  dextrose 50% Injectable 25 Gram(s) IV Push once  dextrose 50% Injectable 12.5 Gram(s) IV Push once  diazepam    Tablet 5 milliGRAM(s) Oral at bedtime  diphenhydrAMINE   Capsule 50 milliGRAM(s) Oral every 6 hours  diphenhydrAMINE   Injectable 50 milliGRAM(s) IV Push at bedtime  enoxaparin Injectable 40 milliGRAM(s) SubCutaneous two times a day  escitalopram 20 milliGRAM(s) Oral daily  fat emulsion (Plant Based) 20% Infusion 0.503 Gm/kG/Day (20.8 mL/Hr) IV Continuous <Continuous>  gabapentin 100 milliGRAM(s) Oral two times a day  heparin  flush 100 Units/mL Injectable 100 Unit(s) IV Push every other day  heparin  flush 100 Units/mL Injectable 100 Unit(s) IV Push every other day  insulin lispro (HumaLOG) corrective regimen sliding scale   SubCutaneous every 6 hours  lidocaine   Patch 1 Patch Transdermal daily  losartan 50 milliGRAM(s) Oral daily  nystatin Powder 1 Application(s) Topical two times a day  octreotide  Injectable 450 MICROGram(s) IV Push daily  oxybutynin 10 milliGRAM(s) Oral two times a day  pantoprazole  Injectable 40 milliGRAM(s) IV Push daily  Parenteral Nutrition - Adult 1 Each (73 mL/Hr) TPN Continuous <Continuous>  Parenteral Nutrition - Adult 1 Each (73 mL/Hr) TPN Continuous <Continuous>  sodium chloride 0.9% lock flush 20 milliLiter(s) IV Push once  vancomycin  IVPB 750 milliGRAM(s) IV Intermittent every 12 hours    MEDICATIONS  (PRN):  acetaminophen   Tablet. 325 milliGRAM(s) Oral every 4 hours PRN Moderate Pain (4 - 6)  ondansetron Injectable 4 milliGRAM(s) IV Push every 6 hours PRN Nausea and/or Vomiting  sodium chloride 0.9% lock flush 10 milliLiter(s) IV Push every 1 hour PRN After each medication administration  sodium chloride 0.9% lock flush 10 milliLiter(s) IV Push every 12 hours PRN Lumen of catheter NOT used      Weight:  NEW STANDING SCALE WT TODAY:   172.5 (12/19)  171.5lb (12/14)  167lb (11/13)  164lb (10/17)  219lb (8/1)    Weight Change:   03/2016: 89kg  02/2017: 74kg   07/2017: 76kg  11/2017: 76kg     >>Bed scale wt indicates wts are trending down.  Was able to obtain standing scale wt today w/ help of RN. Pt currently weighs 172.5lbs per standing scale indicating a 5.5lb wt gain since last month. Pt doing PT during weigh-in    Estimated energy needs:   Estimated energy needs using 52 kg IBW:  increased needs per wound and pressure ulcer healing. needs calculated based on TPN as primary route of nutrition.  30-35 kcal/kg (8630-8893 kcal).   1.8-2 g/kg ( g protein).  30-35 mL/kg (4561-7307 mL fluid).     Subjective:   NPO w/ ice chips and sips of water. pt. Pt seen OOBA w/ PT.  Was able to obtain a standing scale wt of 172.5lb. this indicates a 5.5lb wt gain since last month.  No apparent GI distress. Will follow wts to assess adjusting nutrient needs.  TPN bag infusing at 73ml/hr providing 355g Dex, 140g AA, no lipids today (1767 kcal, 2.7g pro/kg IBW pro, GIR 3.16-based on new standing wt).   Please update weights weekly to trend adequacy of nutrient intake via TPN. Bed scale wt has been trending down ~5-10lbs. Please use standing scale for accuracy.     Previous Nutrition Diagnosis: Increased nutrient needs RT increased demand for nutrients AEB wound and pressure ulcer healing   Active [  X]  Resolved [   ]    If resolved, new PES: N/A    Goal: Pt to meet >75% EER via tolerated route    Recommendations:  1) Continue w/ current TPN order as tolerated and assess feasibility of other routes w/wound healing  2) Monitor triglycerides & adjust lipids per MD discretion  3) Please continue to take standing weight for trending purposes  4) Recommend MVI, OsCal, B12    Education: Understands meeting nutrient needs via TPN.    Risk Level: High [  X ] Moderate [   ] Low [   ].

## 2017-12-20 LAB
ALBUMIN SERPL ELPH-MCNC: 3.2 G/DL — LOW (ref 3.3–5)
ALP SERPL-CCNC: 212 U/L — HIGH (ref 40–120)
ALT FLD-CCNC: 30 U/L — SIGNIFICANT CHANGE UP (ref 10–45)
ANION GAP SERPL CALC-SCNC: 11 MMOL/L — SIGNIFICANT CHANGE UP (ref 5–17)
AST SERPL-CCNC: 22 U/L — SIGNIFICANT CHANGE UP (ref 10–40)
BILIRUB SERPL-MCNC: 0.6 MG/DL — SIGNIFICANT CHANGE UP (ref 0.2–1.2)
BUN SERPL-MCNC: 29 MG/DL — HIGH (ref 7–23)
CALCIUM SERPL-MCNC: 9.5 MG/DL — SIGNIFICANT CHANGE UP (ref 8.4–10.5)
CHLORIDE SERPL-SCNC: 104 MMOL/L — SIGNIFICANT CHANGE UP (ref 96–108)
CO2 SERPL-SCNC: 26 MMOL/L — SIGNIFICANT CHANGE UP (ref 22–31)
CREAT SERPL-MCNC: 0.56 MG/DL — SIGNIFICANT CHANGE UP (ref 0.5–1.3)
GLUCOSE BLDC GLUCOMTR-MCNC: 101 MG/DL — HIGH (ref 70–99)
GLUCOSE BLDC GLUCOMTR-MCNC: 125 MG/DL — HIGH (ref 70–99)
GLUCOSE BLDC GLUCOMTR-MCNC: 95 MG/DL — SIGNIFICANT CHANGE UP (ref 70–99)
GLUCOSE SERPL-MCNC: 114 MG/DL — HIGH (ref 70–99)
POTASSIUM SERPL-MCNC: 3.8 MMOL/L — SIGNIFICANT CHANGE UP (ref 3.5–5.3)
POTASSIUM SERPL-SCNC: 3.8 MMOL/L — SIGNIFICANT CHANGE UP (ref 3.5–5.3)
PROT SERPL-MCNC: 6.7 G/DL — SIGNIFICANT CHANGE UP (ref 6–8.3)
SODIUM SERPL-SCNC: 141 MMOL/L — SIGNIFICANT CHANGE UP (ref 135–145)
VANCOMYCIN TROUGH SERPL-MCNC: 16.8 UG/ML — SIGNIFICANT CHANGE UP (ref 10–20)

## 2017-12-20 RX ORDER — ELECTROLYTE SOLUTION,INJ
1 VIAL (ML) INTRAVENOUS
Qty: 0 | Refills: 0 | Status: DISCONTINUED | OUTPATIENT
Start: 2017-12-20 | End: 2017-12-20

## 2017-12-20 RX ADMIN — PANTOPRAZOLE SODIUM 40 MILLIGRAM(S): 20 TABLET, DELAYED RELEASE ORAL at 06:11

## 2017-12-20 RX ADMIN — ONDANSETRON 4 MILLIGRAM(S): 8 TABLET, FILM COATED ORAL at 18:06

## 2017-12-20 RX ADMIN — Medication 325 MILLIGRAM(S): at 11:36

## 2017-12-20 RX ADMIN — Medication 50 MILLIGRAM(S): at 01:43

## 2017-12-20 RX ADMIN — Medication 50 MILLIGRAM(S): at 21:47

## 2017-12-20 RX ADMIN — Medication 325 MILLIGRAM(S): at 06:45

## 2017-12-20 RX ADMIN — Medication 150 MILLIGRAM(S): at 21:47

## 2017-12-20 RX ADMIN — NYSTATIN CREAM 1 APPLICATION(S): 100000 CREAM TOPICAL at 06:12

## 2017-12-20 RX ADMIN — Medication 50 MILLIGRAM(S): at 06:12

## 2017-12-20 RX ADMIN — Medication 10 MILLIGRAM(S): at 18:05

## 2017-12-20 RX ADMIN — Medication 10 MILLIGRAM(S): at 06:11

## 2017-12-20 RX ADMIN — ENOXAPARIN SODIUM 40 MILLIGRAM(S): 100 INJECTION SUBCUTANEOUS at 06:11

## 2017-12-20 RX ADMIN — GABAPENTIN 100 MILLIGRAM(S): 400 CAPSULE ORAL at 06:12

## 2017-12-20 RX ADMIN — ESCITALOPRAM OXALATE 20 MILLIGRAM(S): 10 TABLET, FILM COATED ORAL at 21:47

## 2017-12-20 RX ADMIN — Medication 5 MILLIGRAM(S): at 21:47

## 2017-12-20 RX ADMIN — ENOXAPARIN SODIUM 40 MILLIGRAM(S): 100 INJECTION SUBCUTANEOUS at 18:06

## 2017-12-20 RX ADMIN — Medication 325 MILLIGRAM(S): at 18:06

## 2017-12-20 RX ADMIN — Medication 50 MILLIGRAM(S): at 18:06

## 2017-12-20 RX ADMIN — NYSTATIN CREAM 1 APPLICATION(S): 100000 CREAM TOPICAL at 18:16

## 2017-12-20 RX ADMIN — GABAPENTIN 100 MILLIGRAM(S): 400 CAPSULE ORAL at 18:06

## 2017-12-20 RX ADMIN — Medication 50 MILLIGRAM(S): at 11:35

## 2017-12-20 RX ADMIN — PREGABALIN 1000 MICROGRAM(S): 225 CAPSULE ORAL at 11:35

## 2017-12-20 RX ADMIN — Medication 1 APPLICATION(S): at 06:12

## 2017-12-20 RX ADMIN — CALCITRIOL 1 MICROGRAM(S): 0.5 CAPSULE ORAL at 11:35

## 2017-12-20 RX ADMIN — Medication 325 MILLIGRAM(S): at 21:50

## 2017-12-20 RX ADMIN — LOSARTAN POTASSIUM 50 MILLIGRAM(S): 100 TABLET, FILM COATED ORAL at 06:12

## 2017-12-20 RX ADMIN — Medication 150 MILLIGRAM(S): at 10:56

## 2017-12-20 RX ADMIN — Medication 325 MILLIGRAM(S): at 06:11

## 2017-12-20 RX ADMIN — OCTREOTIDE ACETATE 450 MICROGRAM(S): 200 INJECTION, SOLUTION INTRAVENOUS; SUBCUTANEOUS at 11:36

## 2017-12-20 RX ADMIN — Medication 325 MILLIGRAM(S): at 12:06

## 2017-12-20 RX ADMIN — Medication 100 UNIT(S): at 11:35

## 2017-12-20 RX ADMIN — Medication 325 MILLIGRAM(S): at 22:50

## 2017-12-20 RX ADMIN — Medication 1 EACH: at 18:05

## 2017-12-20 NOTE — PROGRESS NOTE ADULT - ASSESSMENT
57 F s/p  SBR, fistula resection, esophagojejunostomy revision w/ post-op course complicated by RTOR for distal anastomosis breakdown, ATN, acute respiratory failure, & septic shock, MRSA bacteremia which resolved. Currently for wound care management.     CLD/TPN   Pain/nausea control - only benadryl at midnight and dilaudid 0.25mg during PT session   IS/OOB with PT daily  ISS  Vanco restarted (12/1 - stop date: 12/25), Nystatin powder  Benadryl for abdominal pruritus  SCDs, lovenox (therapeutic), OOBA  Lines :  L left subclavian line (10/4-12/2), L IJ (12/2--)  AM labs, weekly albumin, pre-albumin and triglyceride (every friday).  Wet to dry dressing in place  Colostomy w/o suction

## 2017-12-20 NOTE — PROGRESS NOTE ADULT - SUBJECTIVE AND OBJECTIVE BOX
O/N: BRAYDEN, VSS  12/19: try colostomy w/ no suction OVERNIGHT EVENTS: ARMANDO OWEN  12/19: try colostomy w/ no suction      SUBJECTIVE: Patient examined bedside with chief resident. Patient complaining of incisional pain.   Flatus: [X] YES [] NO             Bowel Movement: [X ] YES [ ] NO  Pain (0-10):            Pain Control Adequate: [ X] YES [ ] NO  Nausea: [ ] YES [X ] NO            Vomiting: [ ] YES [X ] NO  Diarrhea: [ ] YES [X ] NO         Constipation: [ ] YES [ X] NO     Chest Pain: [ ] YES [X ] NO    SOB:  [ ] YES [X ] NO    enoxaparin Injectable 40 milliGRAM(s) SubCutaneous two times a day  heparin  flush 100 Units/mL Injectable 100 Unit(s) IV Push every other day  heparin  flush 100 Units/mL Injectable 100 Unit(s) IV Push every other day  losartan 50 milliGRAM(s) Oral daily  vancomycin  IVPB 750 milliGRAM(s) IV Intermittent every 12 hours      Vital Signs Last 24 Hrs  T(C): 36.6 (20 Dec 2017 06:05), Max: 36.7 (19 Dec 2017 14:39)  T(F): 97.9 (20 Dec 2017 06:05), Max: 98 (19 Dec 2017 14:39)  HR: 66 (20 Dec 2017 06:05) (64 - 73)  BP: 159/94 (20 Dec 2017 06:05) (132/78 - 159/94)  BP(mean): --  RR: 17 (20 Dec 2017 06:05) (16 - 17)  SpO2: 94% (20 Dec 2017 06:05) (94% - 96%)  I&O's Detail    18 Dec 2017 07:01  -  19 Dec 2017 07:00  --------------------------------------------------------  IN:    fat emulsion (Plant Based) 20% Infusion: 83.2 mL    Solution: 300 mL    TPN (Total Parenteral Nutrition): 1679 mL  Total IN: 2062.2 mL    OUT:    Drain: 250 mL  Total OUT: 250 mL    Total NET: 1812.2 mL      19 Dec 2017 07:01  -  20 Dec 2017 06:57  --------------------------------------------------------  IN:    Fat Emulsion 20%: 208 mL    Solution: 150 mL    Solution: 300 mL    Solution: 150 mL    TPN (Total Parenteral Nutrition): 1752 mL  Total IN: 2560 mL    OUT:    Drain: 10 mL  Total OUT: 10 mL    Total NET: 2550 mL          General: NAD, resting comfortably in bed  C/V: NSR  Pulm: Nonlabored breathing, no respiratory distress  Abd: soft, NT/ND.  Extrem: WWP, no edema, SCDs in place        LABS:                        11.5   5.3   )-----------( 221      ( 18 Dec 2017 07:21 )             38.0     12-19    141  |  102  |  37<H>  ----------------------------<  126<H>  3.7   |  29  |  0.55    Ca    9.7      19 Dec 2017 11:19  Phos  3.4     12-18  Mg     2.1     12-18    TPro  7.1  /  Alb  3.0<L>  /  TBili  0.5  /  DBili  x   /  AST  24  /  ALT  35  /  AlkPhos  239<H>  12-18          RADIOLOGY & ADDITIONAL STUDIES: OVERNIGHT EVENTS: ARMANDO OWEN  12/19: try colostomy w/ no suction      SUBJECTIVE: Patient examined bedside with chief resident. Patient complaining of incisional pain.   Flatus: [X] YES [] NO             Bowel Movement: [X ] YES [ ] NO  Pain (0-10):            Pain Control Adequate: [ X] YES [ ] NO  Nausea: [ ] YES [X ] NO            Vomiting: [ ] YES [X ] NO  Diarrhea: [ ] YES [X ] NO         Constipation: [ ] YES [ X] NO     Chest Pain: [ ] YES [X ] NO    SOB:  [ ] YES [X ] NO    enoxaparin Injectable 40 milliGRAM(s) SubCutaneous two times a day  heparin  flush 100 Units/mL Injectable 100 Unit(s) IV Push every other day  heparin  flush 100 Units/mL Injectable 100 Unit(s) IV Push every other day  losartan 50 milliGRAM(s) Oral daily  vancomycin  IVPB 750 milliGRAM(s) IV Intermittent every 12 hours      Vital Signs Last 24 Hrs  T(C): 36.6 (20 Dec 2017 06:05), Max: 36.7 (19 Dec 2017 14:39)  T(F): 97.9 (20 Dec 2017 06:05), Max: 98 (19 Dec 2017 14:39)  HR: 66 (20 Dec 2017 06:05) (64 - 73)  BP: 159/94 (20 Dec 2017 06:05) (132/78 - 159/94)  BP(mean): --  RR: 17 (20 Dec 2017 06:05) (16 - 17)  SpO2: 94% (20 Dec 2017 06:05) (94% - 96%)  I&O's Detail    18 Dec 2017 07:01  -  19 Dec 2017 07:00  --------------------------------------------------------  IN:    fat emulsion (Plant Based) 20% Infusion: 83.2 mL    Solution: 300 mL    TPN (Total Parenteral Nutrition): 1679 mL  Total IN: 2062.2 mL    OUT:    Drain: 250 mL  Total OUT: 250 mL    Total NET: 1812.2 mL      19 Dec 2017 07:01  -  20 Dec 2017 06:57  --------------------------------------------------------  IN:    Fat Emulsion 20%: 208 mL    Solution: 150 mL    Solution: 300 mL    Solution: 150 mL    TPN (Total Parenteral Nutrition): 1752 mL  Total IN: 2560 mL    OUT:    Drain: 10 mL  Total OUT: 10 mL    Total NET: 2550 mL          General: NAD, resting comfortably in bed  C/V: NSR  Pulm: Nonlabored breathing, no respiratory distress  Abd: 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; abdominal wound dressing in place  Extrem: WWP, no edema, SCDs in place        LABS:                        11.5   5.3   )-----------( 221      ( 18 Dec 2017 07:21 )             38.0     12-19    141  |  102  |  37<H>  ----------------------------<  126<H>  3.7   |  29  |  0.55    Ca    9.7      19 Dec 2017 11:19  Phos  3.4     12-18  Mg     2.1     12-18    TPro  7.1  /  Alb  3.0<L>  /  TBili  0.5  /  DBili  x   /  AST  24  /  ALT  35  /  AlkPhos  239<H>  12-18          RADIOLOGY & ADDITIONAL STUDIES: OVERNIGHT EVENTS: ARMANDO OWEN  12/19: try colostomy w/ no suction      SUBJECTIVE: Patient examined bedside with chief resident. Patient denies any complaints   Flatus: [X] YES [] NO             Bowel Movement: [X ] YES [ ] NO  Pain (0-10):            Pain Control Adequate: [ X] YES [ ] NO  Nausea: [ ] YES [X ] NO            Vomiting: [ ] YES [X ] NO  Diarrhea: [ ] YES [X ] NO         Constipation: [ ] YES [ X] NO     Chest Pain: [ ] YES [X ] NO    SOB:  [ ] YES [X ] NO    enoxaparin Injectable 40 milliGRAM(s) SubCutaneous two times a day  heparin  flush 100 Units/mL Injectable 100 Unit(s) IV Push every other day  heparin  flush 100 Units/mL Injectable 100 Unit(s) IV Push every other day  losartan 50 milliGRAM(s) Oral daily  vancomycin  IVPB 750 milliGRAM(s) IV Intermittent every 12 hours      Vital Signs Last 24 Hrs  T(C): 36.6 (20 Dec 2017 06:05), Max: 36.7 (19 Dec 2017 14:39)  T(F): 97.9 (20 Dec 2017 06:05), Max: 98 (19 Dec 2017 14:39)  HR: 66 (20 Dec 2017 06:05) (64 - 73)  BP: 159/94 (20 Dec 2017 06:05) (132/78 - 159/94)  BP(mean): --  RR: 17 (20 Dec 2017 06:05) (16 - 17)  SpO2: 94% (20 Dec 2017 06:05) (94% - 96%)  I&O's Detail    18 Dec 2017 07:01  -  19 Dec 2017 07:00  --------------------------------------------------------  IN:    fat emulsion (Plant Based) 20% Infusion: 83.2 mL    Solution: 300 mL    TPN (Total Parenteral Nutrition): 1679 mL  Total IN: 2062.2 mL    OUT:    Drain: 250 mL  Total OUT: 250 mL    Total NET: 1812.2 mL      19 Dec 2017 07:01  -  20 Dec 2017 06:57  --------------------------------------------------------  IN:    Fat Emulsion 20%: 208 mL    Solution: 150 mL    Solution: 300 mL    Solution: 150 mL    TPN (Total Parenteral Nutrition): 1752 mL  Total IN: 2560 mL    OUT:    Drain: 10 mL  Total OUT: 10 mL    Total NET: 2550 mL          General: NAD, resting comfortably in bed  C/V: NSR  Pulm: Nonlabored breathing, no respiratory distress  Abd: 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; abdominal wound dressing in place  Extrem: WWP, no edema, SCDs in place        LABS:                        11.5   5.3   )-----------( 221      ( 18 Dec 2017 07:21 )             38.0     12-19    141  |  102  |  37<H>  ----------------------------<  126<H>  3.7   |  29  |  0.55    Ca    9.7      19 Dec 2017 11:19  Phos  3.4     12-18  Mg     2.1     12-18    TPro  7.1  /  Alb  3.0<L>  /  TBili  0.5  /  DBili  x   /  AST  24  /  ALT  35  /  AlkPhos  239<H>  12-18          RADIOLOGY & ADDITIONAL STUDIES:

## 2017-12-20 NOTE — PROGRESS NOTE ADULT - SUBJECTIVE AND OBJECTIVE BOX
Looks fine without change  tried to use bag without suction and the system does not hold   go back to suction system  will discuss with residents

## 2017-12-21 LAB
ANION GAP SERPL CALC-SCNC: 9 MMOL/L — SIGNIFICANT CHANGE UP (ref 5–17)
BUN SERPL-MCNC: 33 MG/DL — HIGH (ref 7–23)
CALCIUM SERPL-MCNC: 9.7 MG/DL — SIGNIFICANT CHANGE UP (ref 8.4–10.5)
CHLORIDE SERPL-SCNC: 102 MMOL/L — SIGNIFICANT CHANGE UP (ref 96–108)
CO2 SERPL-SCNC: 29 MMOL/L — SIGNIFICANT CHANGE UP (ref 22–31)
CREAT SERPL-MCNC: 0.58 MG/DL — SIGNIFICANT CHANGE UP (ref 0.5–1.3)
GLUCOSE BLDC GLUCOMTR-MCNC: 104 MG/DL — HIGH (ref 70–99)
GLUCOSE BLDC GLUCOMTR-MCNC: 110 MG/DL — HIGH (ref 70–99)
GLUCOSE BLDC GLUCOMTR-MCNC: 117 MG/DL — HIGH (ref 70–99)
GLUCOSE BLDC GLUCOMTR-MCNC: 86 MG/DL — SIGNIFICANT CHANGE UP (ref 70–99)
GLUCOSE BLDC GLUCOMTR-MCNC: 92 MG/DL — SIGNIFICANT CHANGE UP (ref 70–99)
GLUCOSE SERPL-MCNC: 114 MG/DL — HIGH (ref 70–99)
POTASSIUM SERPL-MCNC: 3.9 MMOL/L — SIGNIFICANT CHANGE UP (ref 3.5–5.3)
POTASSIUM SERPL-SCNC: 3.9 MMOL/L — SIGNIFICANT CHANGE UP (ref 3.5–5.3)
SODIUM SERPL-SCNC: 140 MMOL/L — SIGNIFICANT CHANGE UP (ref 135–145)

## 2017-12-21 RX ORDER — ELECTROLYTE SOLUTION,INJ
1 VIAL (ML) INTRAVENOUS
Qty: 0 | Refills: 0 | Status: DISCONTINUED | OUTPATIENT
Start: 2017-12-21 | End: 2017-12-22

## 2017-12-21 RX ORDER — DIAZEPAM 5 MG
5 TABLET ORAL AT BEDTIME
Qty: 0 | Refills: 0 | Status: DISCONTINUED | OUTPATIENT
Start: 2017-12-22 | End: 2017-12-27

## 2017-12-21 RX ORDER — INFLUENZA VIRUS VACCINE 15; 15; 15; 15 UG/.5ML; UG/.5ML; UG/.5ML; UG/.5ML
0.5 SUSPENSION INTRAMUSCULAR ONCE
Qty: 0 | Refills: 0 | Status: COMPLETED | OUTPATIENT
Start: 2017-12-21 | End: 2017-12-22

## 2017-12-21 RX ORDER — I.V. FAT EMULSION 20 G/100ML
0.5 EMULSION INTRAVENOUS
Qty: 50 | Refills: 0 | Status: DISCONTINUED | OUTPATIENT
Start: 2017-12-21 | End: 2017-12-21

## 2017-12-21 RX ADMIN — Medication 325 MILLIGRAM(S): at 18:38

## 2017-12-21 RX ADMIN — I.V. FAT EMULSION 20.85 GM/KG/DAY: 20 EMULSION INTRAVENOUS at 22:01

## 2017-12-21 RX ADMIN — GABAPENTIN 100 MILLIGRAM(S): 400 CAPSULE ORAL at 17:18

## 2017-12-21 RX ADMIN — Medication 325 MILLIGRAM(S): at 11:15

## 2017-12-21 RX ADMIN — Medication 150 MILLIGRAM(S): at 21:46

## 2017-12-21 RX ADMIN — NYSTATIN CREAM 1 APPLICATION(S): 100000 CREAM TOPICAL at 17:19

## 2017-12-21 RX ADMIN — Medication 325 MILLIGRAM(S): at 22:01

## 2017-12-21 RX ADMIN — Medication 50 MILLIGRAM(S): at 06:19

## 2017-12-21 RX ADMIN — Medication 325 MILLIGRAM(S): at 17:18

## 2017-12-21 RX ADMIN — ESCITALOPRAM OXALATE 20 MILLIGRAM(S): 10 TABLET, FILM COATED ORAL at 21:46

## 2017-12-21 RX ADMIN — PANTOPRAZOLE SODIUM 40 MILLIGRAM(S): 20 TABLET, DELAYED RELEASE ORAL at 06:20

## 2017-12-21 RX ADMIN — Medication 1 APPLICATION(S): at 06:23

## 2017-12-21 RX ADMIN — Medication 150 MILLIGRAM(S): at 11:15

## 2017-12-21 RX ADMIN — Medication 10 MILLIGRAM(S): at 06:20

## 2017-12-21 RX ADMIN — NYSTATIN CREAM 1 APPLICATION(S): 100000 CREAM TOPICAL at 06:23

## 2017-12-21 RX ADMIN — ENOXAPARIN SODIUM 40 MILLIGRAM(S): 100 INJECTION SUBCUTANEOUS at 06:20

## 2017-12-21 RX ADMIN — OCTREOTIDE ACETATE 100 MICROGRAM(S): 200 INJECTION, SOLUTION INTRAVENOUS; SUBCUTANEOUS at 11:16

## 2017-12-21 RX ADMIN — LOSARTAN POTASSIUM 50 MILLIGRAM(S): 100 TABLET, FILM COATED ORAL at 06:20

## 2017-12-21 RX ADMIN — Medication 325 MILLIGRAM(S): at 23:01

## 2017-12-21 RX ADMIN — Medication 5 MILLIGRAM(S): at 21:46

## 2017-12-21 RX ADMIN — Medication 50 MILLIGRAM(S): at 11:15

## 2017-12-21 RX ADMIN — Medication 50 MILLIGRAM(S): at 17:18

## 2017-12-21 RX ADMIN — Medication 50 MILLIGRAM(S): at 21:46

## 2017-12-21 RX ADMIN — Medication 325 MILLIGRAM(S): at 03:56

## 2017-12-21 RX ADMIN — Medication 10 MILLIGRAM(S): at 17:18

## 2017-12-21 RX ADMIN — GABAPENTIN 100 MILLIGRAM(S): 400 CAPSULE ORAL at 06:19

## 2017-12-21 RX ADMIN — ENOXAPARIN SODIUM 40 MILLIGRAM(S): 100 INJECTION SUBCUTANEOUS at 17:18

## 2017-12-21 RX ADMIN — Medication 325 MILLIGRAM(S): at 11:51

## 2017-12-21 NOTE — CHART NOTE - NSCHARTNOTEFT_GEN_A_CORE
Admitting Diagnosis:   Patient is a 57y old  Female who presents with a chief complaint of enterocutaneous fistula (24 Jul 2017 14:15)      PAST MEDICAL & SURGICAL HISTORY:  Reflux  Obesity  DVT (deep venous thrombosis): 2013 neck  Diverticulitis  Hypertension  Elective surgery: abodominal wall surgery  dec 2016  Elective surgery: NOV 2016 gastric bypass revision  Gastric bypass status for obesity: gastric sleeve, 6/2012  S/P breast biopsy: 2011, left  S/P colon resection: 2011  S/P knee surgery: repair 2009, 2011  right; left  Umbilical hernia: 2000  S/P appendectomy: 1975  S/P tonsillectomy: 1967      Current Nutrition Order:  NPO except ice chips and water  >> TPN via central venous line:  1752 TV (73ml/hr); 355g D, 140g AA, 50g 20% lipids (2267kcal calories): (2267 kcal, 2.7g/kg IBW pro, GIR 3.16 based on new standing wt)    PO Intake: Good (%) [   ]  Fair (50-75%) [   ] Poor (<25%) [   ] N/A   Pt continues to request clear liquid options.     GI Issues: No reported GI distress at this time.    Pain: No C/o pain.    Skin Integrity: sacrum un-stageable PU; sacral abrasion; abd wound--> NGtube placed w/ sponge through pouch into fistula for suction    Labs:   12-21    140  |  102  |  33<H>  ----------------------------<  114<H>  3.9   |  29  |  0.58    Ca    9.7      21 Dec 2017 10:57    TPro  6.7  /  Alb  3.2<L>  /  TBili  0.6  /  DBili  x   /  AST  22  /  ALT  30  /  AlkPhos  212<H>  12-20    CAPILLARY BLOOD GLUCOSE      POCT Blood Glucose.: 117 mg/dL (21 Dec 2017 12:07)  POCT Blood Glucose.: 92 mg/dL (21 Dec 2017 05:50)  POCT Blood Glucose.: 110 mg/dL (21 Dec 2017 00:16)  POCT Blood Glucose.: 95 mg/dL (20 Dec 2017 18:19)      Medications:  MEDICATIONS  (STANDING):  bismuth subsalicylate Liquid 30 milliLiter(s) Oral daily  calcitriol Injectable 1 MICROGram(s) IV Push <User Schedule>  collagenase Ointment 1 Application(s) Topical daily  cyanocobalamin Injectable 1000 MICROGram(s) SubCutaneous every 7 days  dextrose 5%. 1000 milliLiter(s) (50 mL/Hr) IV Continuous <Continuous>  dextrose 50% Injectable 25 Gram(s) IV Push once  dextrose 50% Injectable 12.5 Gram(s) IV Push once  diazepam    Tablet 5 milliGRAM(s) Oral at bedtime  diphenhydrAMINE   Capsule 50 milliGRAM(s) Oral every 6 hours  diphenhydrAMINE   Injectable 50 milliGRAM(s) IV Push at bedtime  enoxaparin Injectable 40 milliGRAM(s) SubCutaneous two times a day  escitalopram 20 milliGRAM(s) Oral daily  fat emulsion (Plant Based) 20% Infusion 0.503 Gm/kG/Day (20.853 mL/Hr) IV Continuous <Continuous>  gabapentin 100 milliGRAM(s) Oral two times a day  heparin  flush 100 Units/mL Injectable 100 Unit(s) IV Push every other day  heparin  flush 100 Units/mL Injectable 100 Unit(s) IV Push every other day  influenza   Vaccine 0.5 milliLiter(s) IntraMuscular once  insulin lispro (HumaLOG) corrective regimen sliding scale   SubCutaneous every 6 hours  lidocaine   Patch 1 Patch Transdermal daily  losartan 50 milliGRAM(s) Oral daily  nystatin Powder 1 Application(s) Topical two times a day  octreotide  Injectable 450 MICROGram(s) IV Push daily  oxybutynin 10 milliGRAM(s) Oral two times a day  pantoprazole  Injectable 40 milliGRAM(s) IV Push daily  Parenteral Nutrition - Adult 1 Each (73 mL/Hr) TPN Continuous <Continuous>  Parenteral Nutrition - Adult 1 Each (73 mL/Hr) TPN Continuous <Continuous>  sodium chloride 0.9% lock flush 20 milliLiter(s) IV Push once  vancomycin  IVPB 750 milliGRAM(s) IV Intermittent every 12 hours    MEDICATIONS  (PRN):  acetaminophen   Tablet. 325 milliGRAM(s) Oral every 4 hours PRN Moderate Pain (4 - 6)  ondansetron Injectable 4 milliGRAM(s) IV Push every 6 hours PRN Nausea and/or Vomiting  sodium chloride 0.9% lock flush 10 milliLiter(s) IV Push every 1 hour PRN After each medication administration  sodium chloride 0.9% lock flush 10 milliLiter(s) IV Push every 12 hours PRN Lumen of catheter NOT used    Weight:  172.5 (12/19)  171.5lb (12/14)  167lb (11/13)  164lb (10/17)  219lb (8/1)    Weight Change:   03/2016: 89kg  02/2017: 74kg   07/2017: 76kg  11/2017: 76kg      Pt currently weighs 172.5lbs per standing scale indicating a 5.5lb wt gain since last month. Pt doing PT during weigh-in    Estimated energy needs:   Estimated energy needs using 52 kg IBW:  increased needs per wound and pressure ulcer healing. needs calculated based on TPN as primary route of nutrition.  30-35 kcal/kg (2963-8605 kcal).   1.8-2 g/kg ( g protein).  30-35 mL/kg (2828-1359 mL fluid).     Subjective:   Nutrition course remains the same. Pt is NPO w/ ice chips and sips of water. pt.   No apparent GI distress. Will continue to follow wts to assess adjusting nutrient needs.  TPN bag infusing at 73ml/hr providing 355g Dex, 140g AA, no lipids today (1767 kcal, 2.7g pro/kg IBW pro, GIR 3.16-based on new standing wt).   Please update weights weekly to trend adequacy of nutrient intake via TPN.     Previous Nutrition Diagnosis: Increased nutrient needs RT increased demand for nutrients AEB wound and pressure ulcer healing   Active [  X]  Resolved [   ]    If resolved, new PES: N/A    Goal: Pt to meet >75% EER via tolerated route    Recommendations:  1) Continue w/ current TPN order as tolerated and assess feasibility of other routes w/wound healing  2) Monitor triglycerides & adjust lipids per MD discretion  3) Please continue to take standing weight for trending purposes  4) Recommend MVI, OsCal, B12    Education: Understands meeting nutrient needs via TPN.    Risk Level: High [  X ] Moderate [   ] Low [   ]. Admitting Diagnosis:   Patient is a 57y old  Female who presents with a chief complaint of enterocutaneous fistula (24 Jul 2017 14:15)      PAST MEDICAL & SURGICAL HISTORY:  Reflux  Obesity  DVT (deep venous thrombosis): 2013 neck  Diverticulitis  Hypertension  Elective surgery: abodominal wall surgery  dec 2016  Elective surgery: NOV 2016 gastric bypass revision  Gastric bypass status for obesity: gastric sleeve, 6/2012  S/P breast biopsy: 2011, left  S/P colon resection: 2011  S/P knee surgery: repair 2009, 2011  right; left  Umbilical hernia: 2000  S/P appendectomy: 1975  S/P tonsillectomy: 1967      Current Nutrition Order:  NPO except ice chips and water  >> TPN via central venous line:  1752 TV (73ml/hr); 355g D, 140g AA, 50g 20% lipids (2267kcal calories): (2267 kcal, 2.7g/kg IBW pro, GIR 3.16 based on new standing wt)    PO Intake: Good (%) [   ]  Fair (50-75%) [   ] Poor (<25%) [   ] N/A   Pt continues to request clear liquid options.     GI Issues: No reported GI distress at this time.    Pain: No C/o pain.    Skin Integrity: sacrum un-stageable PU; sacral abrasion; abd wound--> NGtube placed w/ sponge through pouch into fistula for suction    Labs:   12-21    140  |  102  |  33<H>  ----------------------------<  114<H>  3.9   |  29  |  0.58    Ca    9.7      21 Dec 2017 10:57    TPro  6.7  /  Alb  3.2<L>  /  TBili  0.6  /  DBili  x   /  AST  22  /  ALT  30  /  AlkPhos  212<H>  12-20    CAPILLARY BLOOD GLUCOSE      POCT Blood Glucose.: 117 mg/dL (21 Dec 2017 12:07)  POCT Blood Glucose.: 92 mg/dL (21 Dec 2017 05:50)  POCT Blood Glucose.: 110 mg/dL (21 Dec 2017 00:16)  POCT Blood Glucose.: 95 mg/dL (20 Dec 2017 18:19)      Medications:  MEDICATIONS  (STANDING):  bismuth subsalicylate Liquid 30 milliLiter(s) Oral daily  calcitriol Injectable 1 MICROGram(s) IV Push <User Schedule>  collagenase Ointment 1 Application(s) Topical daily  cyanocobalamin Injectable 1000 MICROGram(s) SubCutaneous every 7 days  dextrose 5%. 1000 milliLiter(s) (50 mL/Hr) IV Continuous <Continuous>  dextrose 50% Injectable 25 Gram(s) IV Push once  dextrose 50% Injectable 12.5 Gram(s) IV Push once  diazepam    Tablet 5 milliGRAM(s) Oral at bedtime  diphenhydrAMINE   Capsule 50 milliGRAM(s) Oral every 6 hours  diphenhydrAMINE   Injectable 50 milliGRAM(s) IV Push at bedtime  enoxaparin Injectable 40 milliGRAM(s) SubCutaneous two times a day  escitalopram 20 milliGRAM(s) Oral daily  fat emulsion (Plant Based) 20% Infusion 0.503 Gm/kG/Day (20.853 mL/Hr) IV Continuous <Continuous>  gabapentin 100 milliGRAM(s) Oral two times a day  heparin  flush 100 Units/mL Injectable 100 Unit(s) IV Push every other day  heparin  flush 100 Units/mL Injectable 100 Unit(s) IV Push every other day  influenza   Vaccine 0.5 milliLiter(s) IntraMuscular once  insulin lispro (HumaLOG) corrective regimen sliding scale   SubCutaneous every 6 hours  lidocaine   Patch 1 Patch Transdermal daily  losartan 50 milliGRAM(s) Oral daily  nystatin Powder 1 Application(s) Topical two times a day  octreotide  Injectable 450 MICROGram(s) IV Push daily  oxybutynin 10 milliGRAM(s) Oral two times a day  pantoprazole  Injectable 40 milliGRAM(s) IV Push daily  Parenteral Nutrition - Adult 1 Each (73 mL/Hr) TPN Continuous <Continuous>  Parenteral Nutrition - Adult 1 Each (73 mL/Hr) TPN Continuous <Continuous>  sodium chloride 0.9% lock flush 20 milliLiter(s) IV Push once  vancomycin  IVPB 750 milliGRAM(s) IV Intermittent every 12 hours    MEDICATIONS  (PRN):  acetaminophen   Tablet. 325 milliGRAM(s) Oral every 4 hours PRN Moderate Pain (4 - 6)  ondansetron Injectable 4 milliGRAM(s) IV Push every 6 hours PRN Nausea and/or Vomiting  sodium chloride 0.9% lock flush 10 milliLiter(s) IV Push every 1 hour PRN After each medication administration  sodium chloride 0.9% lock flush 10 milliLiter(s) IV Push every 12 hours PRN Lumen of catheter NOT used    Weight:  172.5 (12/19)  171.5lb (12/14)  167lb (11/13)  164lb (10/17)  219lb (8/1)    Weight Change:   03/2016: 89kg  02/2017: 74kg   07/2017: 76kg  11/2017: 76kg      Pt currently weighs 172.5lbs per standing scale indicating a 5.5lb wt gain since last month. Pt doing PT during weigh-in    Estimated energy needs:   Estimated energy needs using 52 kg IBW:  increased needs per wound and pressure ulcer healing. needs calculated based on TPN as primary route of nutrition.  30-35 kcal/kg (8346-6701 kcal).   1.8-2 g/kg ( g protein).  30-35 mL/kg (5425-3211 mL fluid).     Subjective:   Nutrition course remains the same. Pt is NPO w/ ice chips and sips of water. pt.   No apparent GI distress. Will continue to follow wts to assess adjusting nutrient needs.  TPN bag infusing at 73ml/hr providing 355g Dex, 140g AA, no lipids today (1767 kcal, 2.7g pro/kg IBW pro, GIR 3.16-based on new standing wt).   Please update weights weekly to trend adequacy of nutrient intake via TPN.     Previous Nutrition Diagnosis: Increased nutrient needs RT increased demand for nutrients AEB wound and pressure ulcer healing   Active [  X]  Resolved [   ]    If resolved, new PES: N/A    Goal: Pt to meet >75% EER via tolerated route    Recommendations:  1) Continue w/ current TPN order as tolerated and assess feasibility of other routes w/wound healing  2) Monitor triglycerides & adjust lipids per MD discretion  3) Please continue to take standing weight for trending purposes  4) Consider OsCal tablet crushed w/ water/italian ice     Education: Understands meeting nutrient needs via TPN.    Risk Level: High [  X ] Moderate [   ] Low [   ].

## 2017-12-21 NOTE — PROGRESS NOTE ADULT - SUBJECTIVE AND OBJECTIVE BOX
O/N: VSS, BRAYDEN  12/20: BRAYDEN , VSS , dressing  changed, Vanco trough  16.8 O/N: VSS, BRAYDEN  12/20: BRAYDEN , VSS , dressing  changed, Vanco trough  16.8    SUBJECTIVE: Patient seen and examined bedside by surgery team.    enoxaparin Injectable 40 milliGRAM(s) SubCutaneous two times a day  heparin  flush 100 Units/mL Injectable 100 Unit(s) IV Push every other day  heparin  flush 100 Units/mL Injectable 100 Unit(s) IV Push every other day  losartan 50 milliGRAM(s) Oral daily  vancomycin  IVPB 750 milliGRAM(s) IV Intermittent every 12 hours      Vital Signs Last 24 Hrs  T(C): 36.2 (21 Dec 2017 05:32), Max: 37.1 (20 Dec 2017 18:51)  T(F): 97.2 (21 Dec 2017 05:32), Max: 98.8 (20 Dec 2017 18:51)  HR: 64 (21 Dec 2017 05:32) (64 - 78)  BP: 124/76 (21 Dec 2017 05:32) (123/79 - 144/87)  BP(mean): --  RR: 17 (21 Dec 2017 05:32) (16 - 18)  SpO2: 95% (21 Dec 2017 05:32) (94% - 97%)  I&O's Detail    20 Dec 2017 07:01  -  21 Dec 2017 07:00  --------------------------------------------------------  IN:    Solution: 150 mL    TPN (Total Parenteral Nutrition): 1752 mL  Total IN: 1902 mL    OUT:    Drain: 100 mL  Total OUT: 100 mL    Total NET: 1802 mL        Physical Exam  General: NAD, resting comfortably in bed  C/V: NSR  Pulm: Nonlabored breathing, no respiratory distress  Abd: soft, NT/ND. Wound pink and granulation tissue present; abdominal lesion 10 cm x 8 cm  Extrem: WWP, no edema, SCDs in place        LABS:    12-20    141  |  104  |  29<H>  ----------------------------<  114<H>  3.8   |  26  |  0.56    Ca    9.5      20 Dec 2017 09:04    TPro  6.7  /  Alb  3.2<L>  /  TBili  0.6  /  DBili  x   /  AST  22  /  ALT  30  /  AlkPhos  212<H>  12-20          RADIOLOGY & ADDITIONAL STUDIES:

## 2017-12-22 LAB
ANION GAP SERPL CALC-SCNC: 9 MMOL/L — SIGNIFICANT CHANGE UP (ref 5–17)
BUN SERPL-MCNC: 32 MG/DL — HIGH (ref 7–23)
CALCIUM SERPL-MCNC: 9.5 MG/DL — SIGNIFICANT CHANGE UP (ref 8.4–10.5)
CHLORIDE SERPL-SCNC: 103 MMOL/L — SIGNIFICANT CHANGE UP (ref 96–108)
CO2 SERPL-SCNC: 26 MMOL/L — SIGNIFICANT CHANGE UP (ref 22–31)
CREAT SERPL-MCNC: 0.52 MG/DL — SIGNIFICANT CHANGE UP (ref 0.5–1.3)
GLUCOSE BLDC GLUCOMTR-MCNC: 108 MG/DL — HIGH (ref 70–99)
GLUCOSE BLDC GLUCOMTR-MCNC: 110 MG/DL — HIGH (ref 70–99)
GLUCOSE BLDC GLUCOMTR-MCNC: 148 MG/DL — HIGH (ref 70–99)
GLUCOSE BLDC GLUCOMTR-MCNC: 74 MG/DL — SIGNIFICANT CHANGE UP (ref 70–99)
GLUCOSE SERPL-MCNC: 127 MG/DL — HIGH (ref 70–99)
MAGNESIUM SERPL-MCNC: 2 MG/DL — SIGNIFICANT CHANGE UP (ref 1.6–2.6)
PHOSPHATE SERPL-MCNC: 2.9 MG/DL — SIGNIFICANT CHANGE UP (ref 2.5–4.5)
POTASSIUM SERPL-MCNC: 3.8 MMOL/L — SIGNIFICANT CHANGE UP (ref 3.5–5.3)
POTASSIUM SERPL-SCNC: 3.8 MMOL/L — SIGNIFICANT CHANGE UP (ref 3.5–5.3)
SODIUM SERPL-SCNC: 138 MMOL/L — SIGNIFICANT CHANGE UP (ref 135–145)

## 2017-12-22 RX ORDER — ELECTROLYTE SOLUTION,INJ
1 VIAL (ML) INTRAVENOUS
Qty: 0 | Refills: 0 | Status: DISCONTINUED | OUTPATIENT
Start: 2017-12-22 | End: 2017-12-22

## 2017-12-22 RX ORDER — HYDROMORPHONE HYDROCHLORIDE 2 MG/ML
0.5 INJECTION INTRAMUSCULAR; INTRAVENOUS; SUBCUTANEOUS ONCE
Qty: 0 | Refills: 0 | Status: DISCONTINUED | OUTPATIENT
Start: 2017-12-22 | End: 2017-12-22

## 2017-12-22 RX ADMIN — NYSTATIN CREAM 1 APPLICATION(S): 100000 CREAM TOPICAL at 16:46

## 2017-12-22 RX ADMIN — Medication 50 MILLIGRAM(S): at 16:45

## 2017-12-22 RX ADMIN — Medication 5 MILLIGRAM(S): at 21:46

## 2017-12-22 RX ADMIN — Medication 10 MILLIGRAM(S): at 16:45

## 2017-12-22 RX ADMIN — Medication 50 MILLIGRAM(S): at 05:11

## 2017-12-22 RX ADMIN — ONDANSETRON 4 MILLIGRAM(S): 8 TABLET, FILM COATED ORAL at 15:39

## 2017-12-22 RX ADMIN — ENOXAPARIN SODIUM 40 MILLIGRAM(S): 100 INJECTION SUBCUTANEOUS at 06:27

## 2017-12-22 RX ADMIN — GABAPENTIN 100 MILLIGRAM(S): 400 CAPSULE ORAL at 16:45

## 2017-12-22 RX ADMIN — Medication 10 MILLIGRAM(S): at 06:27

## 2017-12-22 RX ADMIN — Medication 100 UNIT(S): at 11:11

## 2017-12-22 RX ADMIN — PANTOPRAZOLE SODIUM 40 MILLIGRAM(S): 20 TABLET, DELAYED RELEASE ORAL at 06:27

## 2017-12-22 RX ADMIN — Medication 150 MILLIGRAM(S): at 11:10

## 2017-12-22 RX ADMIN — ESCITALOPRAM OXALATE 20 MILLIGRAM(S): 10 TABLET, FILM COATED ORAL at 21:46

## 2017-12-22 RX ADMIN — GABAPENTIN 100 MILLIGRAM(S): 400 CAPSULE ORAL at 06:27

## 2017-12-22 RX ADMIN — NYSTATIN CREAM 1 APPLICATION(S): 100000 CREAM TOPICAL at 06:28

## 2017-12-22 RX ADMIN — Medication 325 MILLIGRAM(S): at 16:45

## 2017-12-22 RX ADMIN — Medication 1 APPLICATION(S): at 06:27

## 2017-12-22 RX ADMIN — LOSARTAN POTASSIUM 50 MILLIGRAM(S): 100 TABLET, FILM COATED ORAL at 06:27

## 2017-12-22 RX ADMIN — OCTREOTIDE ACETATE 450 MICROGRAM(S): 200 INJECTION, SOLUTION INTRAVENOUS; SUBCUTANEOUS at 11:09

## 2017-12-22 RX ADMIN — Medication 325 MILLIGRAM(S): at 17:01

## 2017-12-22 RX ADMIN — ENOXAPARIN SODIUM 40 MILLIGRAM(S): 100 INJECTION SUBCUTANEOUS at 16:47

## 2017-12-22 RX ADMIN — Medication 50 MILLIGRAM(S): at 21:46

## 2017-12-22 RX ADMIN — Medication 100 UNIT(S): at 11:10

## 2017-12-22 RX ADMIN — CALCITRIOL 1 MICROGRAM(S): 0.5 CAPSULE ORAL at 11:11

## 2017-12-22 RX ADMIN — Medication 1 EACH: at 16:47

## 2017-12-22 RX ADMIN — Medication 325 MILLIGRAM(S): at 16:32

## 2017-12-22 RX ADMIN — Medication 150 MILLIGRAM(S): at 21:46

## 2017-12-22 RX ADMIN — INFLUENZA VIRUS VACCINE 0.5 MILLILITER(S): 15; 15; 15; 15 SUSPENSION INTRAMUSCULAR at 16:45

## 2017-12-22 RX ADMIN — Medication 50 MILLIGRAM(S): at 11:10

## 2017-12-22 RX ADMIN — Medication 325 MILLIGRAM(S): at 11:10

## 2017-12-22 RX ADMIN — HYDROMORPHONE HYDROCHLORIDE 0.5 MILLIGRAM(S): 2 INJECTION INTRAMUSCULAR; INTRAVENOUS; SUBCUTANEOUS at 15:39

## 2017-12-22 RX ADMIN — HYDROMORPHONE HYDROCHLORIDE 0.5 MILLIGRAM(S): 2 INJECTION INTRAMUSCULAR; INTRAVENOUS; SUBCUTANEOUS at 16:32

## 2017-12-22 NOTE — PROGRESS NOTE ADULT - SUBJECTIVE AND OBJECTIVE BOX
On interval followup, the left int jugular catheter site is clean, dry, non-inflamed and non-tender. Afebrile. Notes and cultures are followed. The biopatch dressing was discussed with the nurse.

## 2017-12-22 NOTE — PROGRESS NOTE ADULT - SUBJECTIVE AND OBJECTIVE BOX
O/N: VSS. BRAYDEN  12/21: ARMANDO OWEN O/N: VSS. BRAYDEN  12/21: ARMANDO OWEN    SUBJECTIVE: Patient seen and examined bedside by surgery team. Pain well-controlled. Denies nausea, vomiting, chest pain, shortness of breath, fevers, or chills.    enoxaparin Injectable 40 milliGRAM(s) SubCutaneous two times a day  heparin  flush 100 Units/mL Injectable 100 Unit(s) IV Push every other day  heparin  flush 100 Units/mL Injectable 100 Unit(s) IV Push every other day  losartan 50 milliGRAM(s) Oral daily  vancomycin  IVPB 750 milliGRAM(s) IV Intermittent every 12 hours      Vital Signs Last 24 Hrs  T(C): 36.4 (22 Dec 2017 05:09), Max: 36.9 (21 Dec 2017 20:57)  T(F): 97.5 (22 Dec 2017 05:09), Max: 98.5 (21 Dec 2017 20:57)  HR: 70 (22 Dec 2017 05:09) (65 - 78)  BP: 125/77 (22 Dec 2017 05:09) (125/77 - 136/83)  BP(mean): --  RR: 16 (22 Dec 2017 05:09) (16 - 17)  SpO2: 95% (22 Dec 2017 05:09) (95% - 97%)  I&O's Detail    21 Dec 2017 07:01  -  22 Dec 2017 07:00  --------------------------------------------------------  IN:    Fat Emulsion 20%: 208 mL    TPN (Total Parenteral Nutrition): 1752 mL  Total IN: 1960 mL    OUT:    Drain: 200 mL    Voided: 600 mL  Total OUT: 800 mL    Total NET: 1160 mL            Physical Exam  General: NAD, resting comfortably in bed  C/V: NSR  Pulm: Nonlabored breathing, no respiratory distress  Abd: soft, NT/ND. Wound pink and granulation tissue present; abdominal lesion 10 cm x 8 cm  Extrem: WWP, no edema, SCDs in place        LABS:    12-21    140  |  102  |  33<H>  ----------------------------<  114<H>  3.9   |  29  |  0.58    Ca    9.7      21 Dec 2017 10:57    TPro  6.7  /  Alb  3.2<L>  /  TBili  0.6  /  DBili  x   /  AST  22  /  ALT  30  /  AlkPhos  212<H>  12-20          RADIOLOGY & ADDITIONAL STUDIES:

## 2017-12-22 NOTE — PROGRESS NOTE ADULT - ASSESSMENT
57 F s/p  SBR, fistula resection, esophagojejunostomy revision w/ post-op course complicated by RTOR for distal anastomosis breakdown, ATN, acute respiratory failure, & septic shock, MRSA bacteremia which resolved. Currently for wound care management.     CLD/TPN   Pain/nausea control - only benadryl at midnight and dilaudid 0.25mg during PT session   IS/OOB with PT daily  ISS  Vanco restarted (12/1 - stop date: 12/25), Nystatin powder  Benadryl for abdominal pruritus  SCDs, lovenox (therapeutic), OOBA  Lines :  L left subclavian line (10/4-12/2), L IJ (12/2--)  AM labs, weekly albumin, pre-albumin and triglyceride (every friday).  Wet to dry dressing in place  Colostomy w/o suction 57 F s/p  SBR, fistula resection, esophagojejunostomy revision w/ post-op course complicated by RTOR for distal anastomosis breakdown, ATN, acute respiratory failure, & septic shock, MRSA bacteremia which resolved. Currently for wound care management.       CLD/TPN   Pain/nausea control - only benadryl at midnight and dilaudid 0.25mg during PT session   IS/OOB with PT daily  ISS  Vanco restarted (12/1 - stop date: 12/25), Nystatin powder  Benadryl for abdominal pruritus  SCDs, lovenox (therapeutic), OOBA  Lines :  L left subclavian line (10/4-12/2), L IJ (12/2--)  AM labs, weekly albumin, pre-albumin and triglyceride (every friday).  Wet to dry dressing in place  Colostomy w/ suction

## 2017-12-23 LAB
ANION GAP SERPL CALC-SCNC: 10 MMOL/L — SIGNIFICANT CHANGE UP (ref 5–17)
BUN SERPL-MCNC: 38 MG/DL — HIGH (ref 7–23)
CALCIUM SERPL-MCNC: 9.4 MG/DL — SIGNIFICANT CHANGE UP (ref 8.4–10.5)
CHLORIDE SERPL-SCNC: 103 MMOL/L — SIGNIFICANT CHANGE UP (ref 96–108)
CO2 SERPL-SCNC: 28 MMOL/L — SIGNIFICANT CHANGE UP (ref 22–31)
CREAT SERPL-MCNC: 0.59 MG/DL — SIGNIFICANT CHANGE UP (ref 0.5–1.3)
GLUCOSE BLDC GLUCOMTR-MCNC: 121 MG/DL — HIGH (ref 70–99)
GLUCOSE BLDC GLUCOMTR-MCNC: 149 MG/DL — HIGH (ref 70–99)
GLUCOSE BLDC GLUCOMTR-MCNC: 89 MG/DL — SIGNIFICANT CHANGE UP (ref 70–99)
GLUCOSE SERPL-MCNC: 123 MG/DL — HIGH (ref 70–99)
MAGNESIUM SERPL-MCNC: 2.1 MG/DL — SIGNIFICANT CHANGE UP (ref 1.6–2.6)
PHOSPHATE SERPL-MCNC: 3.2 MG/DL — SIGNIFICANT CHANGE UP (ref 2.5–4.5)
POTASSIUM SERPL-MCNC: 3.8 MMOL/L — SIGNIFICANT CHANGE UP (ref 3.5–5.3)
POTASSIUM SERPL-SCNC: 3.8 MMOL/L — SIGNIFICANT CHANGE UP (ref 3.5–5.3)
SODIUM SERPL-SCNC: 141 MMOL/L — SIGNIFICANT CHANGE UP (ref 135–145)
VANCOMYCIN TROUGH SERPL-MCNC: 18.5 UG/ML — SIGNIFICANT CHANGE UP (ref 10–20)

## 2017-12-23 RX ORDER — POTASSIUM CHLORIDE 20 MEQ
20 PACKET (EA) ORAL ONCE
Qty: 0 | Refills: 0 | Status: DISCONTINUED | OUTPATIENT
Start: 2017-12-23 | End: 2017-12-23

## 2017-12-23 RX ORDER — I.V. FAT EMULSION 20 G/100ML
0.5 EMULSION INTRAVENOUS
Qty: 50 | Refills: 0 | Status: DISCONTINUED | OUTPATIENT
Start: 2017-12-23 | End: 2017-12-23

## 2017-12-23 RX ORDER — HYDROMORPHONE HYDROCHLORIDE 2 MG/ML
0.5 INJECTION INTRAMUSCULAR; INTRAVENOUS; SUBCUTANEOUS ONCE
Qty: 0 | Refills: 0 | Status: DISCONTINUED | OUTPATIENT
Start: 2017-12-23 | End: 2017-12-23

## 2017-12-23 RX ORDER — ELECTROLYTE SOLUTION,INJ
1 VIAL (ML) INTRAVENOUS
Qty: 0 | Refills: 0 | Status: DISCONTINUED | OUTPATIENT
Start: 2017-12-23 | End: 2017-12-23

## 2017-12-23 RX ORDER — POTASSIUM CHLORIDE 20 MEQ
10 PACKET (EA) ORAL
Qty: 0 | Refills: 0 | Status: COMPLETED | OUTPATIENT
Start: 2017-12-23 | End: 2017-12-23

## 2017-12-23 RX ADMIN — Medication 150 MILLIGRAM(S): at 10:57

## 2017-12-23 RX ADMIN — HYDROMORPHONE HYDROCHLORIDE 0.5 MILLIGRAM(S): 2 INJECTION INTRAMUSCULAR; INTRAVENOUS; SUBCUTANEOUS at 21:32

## 2017-12-23 RX ADMIN — Medication 100 MILLIEQUIVALENT(S): at 12:17

## 2017-12-23 RX ADMIN — I.V. FAT EMULSION 20.83 GM/KG/DAY: 20 EMULSION INTRAVENOUS at 21:29

## 2017-12-23 RX ADMIN — Medication 10 MILLIGRAM(S): at 05:56

## 2017-12-23 RX ADMIN — Medication 50 MILLIGRAM(S): at 05:56

## 2017-12-23 RX ADMIN — GABAPENTIN 100 MILLIGRAM(S): 400 CAPSULE ORAL at 05:56

## 2017-12-23 RX ADMIN — ENOXAPARIN SODIUM 40 MILLIGRAM(S): 100 INJECTION SUBCUTANEOUS at 05:56

## 2017-12-23 RX ADMIN — ESCITALOPRAM OXALATE 20 MILLIGRAM(S): 10 TABLET, FILM COATED ORAL at 21:35

## 2017-12-23 RX ADMIN — ENOXAPARIN SODIUM 40 MILLIGRAM(S): 100 INJECTION SUBCUTANEOUS at 17:54

## 2017-12-23 RX ADMIN — Medication 1 EACH: at 17:49

## 2017-12-23 RX ADMIN — Medication 50 MILLIGRAM(S): at 21:30

## 2017-12-23 RX ADMIN — Medication 10 MILLIGRAM(S): at 17:53

## 2017-12-23 RX ADMIN — GABAPENTIN 100 MILLIGRAM(S): 400 CAPSULE ORAL at 17:53

## 2017-12-23 RX ADMIN — Medication 50 MILLIGRAM(S): at 17:53

## 2017-12-23 RX ADMIN — LOSARTAN POTASSIUM 50 MILLIGRAM(S): 100 TABLET, FILM COATED ORAL at 05:56

## 2017-12-23 RX ADMIN — OCTREOTIDE ACETATE 450 MICROGRAM(S): 200 INJECTION, SOLUTION INTRAVENOUS; SUBCUTANEOUS at 12:17

## 2017-12-23 RX ADMIN — Medication 100 MILLIEQUIVALENT(S): at 14:42

## 2017-12-23 RX ADMIN — Medication 50 MILLIGRAM(S): at 12:16

## 2017-12-23 RX ADMIN — Medication 5 MILLIGRAM(S): at 21:37

## 2017-12-23 RX ADMIN — HYDROMORPHONE HYDROCHLORIDE 0.5 MILLIGRAM(S): 2 INJECTION INTRAMUSCULAR; INTRAVENOUS; SUBCUTANEOUS at 21:23

## 2017-12-23 RX ADMIN — Medication 150 MILLIGRAM(S): at 21:24

## 2017-12-23 RX ADMIN — Medication 50 MILLIGRAM(S): at 00:08

## 2017-12-23 RX ADMIN — NYSTATIN CREAM 1 APPLICATION(S): 100000 CREAM TOPICAL at 17:55

## 2017-12-23 RX ADMIN — Medication 100 MILLIEQUIVALENT(S): at 13:11

## 2017-12-23 RX ADMIN — NYSTATIN CREAM 1 APPLICATION(S): 100000 CREAM TOPICAL at 05:56

## 2017-12-23 RX ADMIN — PANTOPRAZOLE SODIUM 40 MILLIGRAM(S): 20 TABLET, DELAYED RELEASE ORAL at 05:57

## 2017-12-23 RX ADMIN — Medication 1 APPLICATION(S): at 05:56

## 2017-12-23 NOTE — PROGRESS NOTE ADULT - ASSESSMENT
57 F s/p  SBR, fistula resection, esophagojejunostomy revision w/ post-op course complicated by RTOR for distal anastomosis breakdown, ATN, acute respiratory failure, & septic shock, MRSA bacteremia which resolved. Currently for wound care management.     CLD/TPN   Pain/nausea control - only benadryl at midnight and dilaudid 0.25mg during PT session   IS/OOB with PT daily  ISS  Vanco restarted (12/1 - stop date: 12/25), Nystatin powder  Benadryl for abdominal pruritus  SCDs, lovenox (therapeutic), OOBA  Lines :  L left subclavian line (10/4-12/2), L IJ (12/2--)  AM labs, weekly albumin, pre-albumin and triglyceride (every friday).  Wet to dry dressing in place

## 2017-12-23 NOTE — PROGRESS NOTE ADULT - SUBJECTIVE AND OBJECTIVE BOX
O/N: VSS. BRAYDEN  12/22: dressing changed; visit from Caitlyn and another surgeon; plan for procedure Jan vs Feb; O/N: VSS. BRAYDEN  12/22: dressing changed; visit from Caitlyn and another surgeon; plan for procedure Jan vs Feb;     SUBJECTIVE: Patient seen and examined bedside by surgery team. Pain well-controlled. Denies nausea, vomiting, chest pain, shortness of breath, fevers, or chills.  Wound vac intact      enoxaparin Injectable 40 milliGRAM(s) SubCutaneous two times a day  heparin  flush 100 Units/mL Injectable 100 Unit(s) IV Push every other day  heparin  flush 100 Units/mL Injectable 100 Unit(s) IV Push every other day  losartan 50 milliGRAM(s) Oral daily  vancomycin  IVPB 750 milliGRAM(s) IV Intermittent every 12 hours      Vital Signs Last 24 Hrs  T(C): 36.4 (23 Dec 2017 06:44), Max: 36.7 (22 Dec 2017 14:45)  T(F): 97.5 (23 Dec 2017 06:44), Max: 98 (22 Dec 2017 14:45)  HR: 75 (23 Dec 2017 06:44) (71 - 95)  BP: 137/81 (23 Dec 2017 06:44) (105/78 - 162/94)  BP(mean): --  RR: 16 (23 Dec 2017 06:44) (16 - 16)  SpO2: 96% (23 Dec 2017 06:44) (95% - 96%)  I&O's Detail    22 Dec 2017 07:01  -  23 Dec 2017 07:00  --------------------------------------------------------  IN:    Solution: 150 mL    TPN (Total Parenteral Nutrition): 511 mL  Total IN: 661 mL    OUT:    Drain: 250 mL  Total OUT: 250 mL    Total NET: 411 mL        Physical Exam  General: NAD, resting comfortably in bed  C/V: NSR  Pulm: Nonlabored breathing, no respiratory distress  Abd: soft, NT/ND. Wound pink and granulation tissue present; abdominal lesion 10 cm x 8 cm; dressing in place with good seal attached to low intermittent wall suction  Extrem: WWP, no edema, SCDs in place          LABS:    12-23    141  |  103  |  38<H>  ----------------------------<  123<H>  3.8   |  28  |  0.59    Ca    9.4      23 Dec 2017 06:46  Phos  3.2     12-23  Mg     2.1     12-23            RADIOLOGY & ADDITIONAL STUDIES:

## 2017-12-24 LAB
ALBUMIN SERPL ELPH-MCNC: 3 G/DL — LOW (ref 3.3–5)
ALP SERPL-CCNC: 202 U/L — HIGH (ref 40–120)
ALT FLD-CCNC: 28 U/L — SIGNIFICANT CHANGE UP (ref 10–45)
ANION GAP SERPL CALC-SCNC: 11 MMOL/L — SIGNIFICANT CHANGE UP (ref 5–17)
AST SERPL-CCNC: 22 U/L — SIGNIFICANT CHANGE UP (ref 10–40)
BILIRUB SERPL-MCNC: 0.5 MG/DL — SIGNIFICANT CHANGE UP (ref 0.2–1.2)
BUN SERPL-MCNC: 30 MG/DL — HIGH (ref 7–23)
CALCIUM SERPL-MCNC: 9.5 MG/DL — SIGNIFICANT CHANGE UP (ref 8.4–10.5)
CHLORIDE SERPL-SCNC: 103 MMOL/L — SIGNIFICANT CHANGE UP (ref 96–108)
CO2 SERPL-SCNC: 27 MMOL/L — SIGNIFICANT CHANGE UP (ref 22–31)
CREAT SERPL-MCNC: 0.55 MG/DL — SIGNIFICANT CHANGE UP (ref 0.5–1.3)
GLUCOSE BLDC GLUCOMTR-MCNC: 104 MG/DL — HIGH (ref 70–99)
GLUCOSE BLDC GLUCOMTR-MCNC: 106 MG/DL — HIGH (ref 70–99)
GLUCOSE BLDC GLUCOMTR-MCNC: 121 MG/DL — HIGH (ref 70–99)
GLUCOSE BLDC GLUCOMTR-MCNC: 124 MG/DL — HIGH (ref 70–99)
GLUCOSE BLDC GLUCOMTR-MCNC: 130 MG/DL — HIGH (ref 70–99)
GLUCOSE SERPL-MCNC: 136 MG/DL — HIGH (ref 70–99)
HCT VFR BLD CALC: 32.6 % — LOW (ref 34.5–45)
HGB BLD-MCNC: 10 G/DL — LOW (ref 11.5–15.5)
MAGNESIUM SERPL-MCNC: 2 MG/DL — SIGNIFICANT CHANGE UP (ref 1.6–2.6)
MCHC RBC-ENTMCNC: 26.7 PG — LOW (ref 27–34)
MCHC RBC-ENTMCNC: 30.7 G/DL — LOW (ref 32–36)
MCV RBC AUTO: 87.2 FL — SIGNIFICANT CHANGE UP (ref 80–100)
PHOSPHATE SERPL-MCNC: 3 MG/DL — SIGNIFICANT CHANGE UP (ref 2.5–4.5)
PLATELET # BLD AUTO: 223 K/UL — SIGNIFICANT CHANGE UP (ref 150–400)
POTASSIUM SERPL-MCNC: 4 MMOL/L — SIGNIFICANT CHANGE UP (ref 3.5–5.3)
POTASSIUM SERPL-SCNC: 4 MMOL/L — SIGNIFICANT CHANGE UP (ref 3.5–5.3)
PROT SERPL-MCNC: 6.5 G/DL — SIGNIFICANT CHANGE UP (ref 6–8.3)
RBC # BLD: 3.74 M/UL — LOW (ref 3.8–5.2)
RBC # FLD: 16.5 % — SIGNIFICANT CHANGE UP (ref 10.3–16.9)
SODIUM SERPL-SCNC: 141 MMOL/L — SIGNIFICANT CHANGE UP (ref 135–145)
VANCOMYCIN TROUGH SERPL-MCNC: 15.9 UG/ML — SIGNIFICANT CHANGE UP (ref 10–20)
WBC # BLD: 4.3 K/UL — SIGNIFICANT CHANGE UP (ref 3.8–10.5)
WBC # FLD AUTO: 4.3 K/UL — SIGNIFICANT CHANGE UP (ref 3.8–10.5)

## 2017-12-24 RX ORDER — HYDROMORPHONE HYDROCHLORIDE 2 MG/ML
0.25 INJECTION INTRAMUSCULAR; INTRAVENOUS; SUBCUTANEOUS ONCE
Qty: 0 | Refills: 0 | Status: DISCONTINUED | OUTPATIENT
Start: 2017-12-24 | End: 2017-12-24

## 2017-12-24 RX ORDER — ELECTROLYTE SOLUTION,INJ
1 VIAL (ML) INTRAVENOUS
Qty: 0 | Refills: 0 | Status: DISCONTINUED | OUTPATIENT
Start: 2017-12-24 | End: 2017-12-24

## 2017-12-24 RX ADMIN — OCTREOTIDE ACETATE 450 MICROGRAM(S): 200 INJECTION, SOLUTION INTRAVENOUS; SUBCUTANEOUS at 12:39

## 2017-12-24 RX ADMIN — Medication 325 MILLIGRAM(S): at 01:45

## 2017-12-24 RX ADMIN — Medication 1 APPLICATION(S): at 05:40

## 2017-12-24 RX ADMIN — GABAPENTIN 100 MILLIGRAM(S): 400 CAPSULE ORAL at 05:39

## 2017-12-24 RX ADMIN — Medication 150 MILLIGRAM(S): at 11:30

## 2017-12-24 RX ADMIN — LOSARTAN POTASSIUM 50 MILLIGRAM(S): 100 TABLET, FILM COATED ORAL at 05:39

## 2017-12-24 RX ADMIN — Medication 325 MILLIGRAM(S): at 00:10

## 2017-12-24 RX ADMIN — Medication 10 MILLIGRAM(S): at 05:40

## 2017-12-24 RX ADMIN — Medication 325 MILLIGRAM(S): at 13:28

## 2017-12-24 RX ADMIN — HYDROMORPHONE HYDROCHLORIDE 0.25 MILLIGRAM(S): 2 INJECTION INTRAMUSCULAR; INTRAVENOUS; SUBCUTANEOUS at 16:55

## 2017-12-24 RX ADMIN — Medication 325 MILLIGRAM(S): at 12:38

## 2017-12-24 RX ADMIN — Medication 100 UNIT(S): at 12:37

## 2017-12-24 RX ADMIN — ENOXAPARIN SODIUM 40 MILLIGRAM(S): 100 INJECTION SUBCUTANEOUS at 05:40

## 2017-12-24 RX ADMIN — Medication 50 MILLIGRAM(S): at 22:46

## 2017-12-24 RX ADMIN — ESCITALOPRAM OXALATE 20 MILLIGRAM(S): 10 TABLET, FILM COATED ORAL at 22:46

## 2017-12-24 RX ADMIN — HYDROMORPHONE HYDROCHLORIDE 0.25 MILLIGRAM(S): 2 INJECTION INTRAMUSCULAR; INTRAVENOUS; SUBCUTANEOUS at 16:40

## 2017-12-24 RX ADMIN — GABAPENTIN 100 MILLIGRAM(S): 400 CAPSULE ORAL at 17:59

## 2017-12-24 RX ADMIN — Medication 50 MILLIGRAM(S): at 12:38

## 2017-12-24 RX ADMIN — Medication 150 MILLIGRAM(S): at 22:46

## 2017-12-24 RX ADMIN — Medication 50 MILLIGRAM(S): at 17:59

## 2017-12-24 RX ADMIN — NYSTATIN CREAM 1 APPLICATION(S): 100000 CREAM TOPICAL at 18:00

## 2017-12-24 RX ADMIN — ENOXAPARIN SODIUM 40 MILLIGRAM(S): 100 INJECTION SUBCUTANEOUS at 18:00

## 2017-12-24 RX ADMIN — Medication 1 EACH: at 18:14

## 2017-12-24 RX ADMIN — NYSTATIN CREAM 1 APPLICATION(S): 100000 CREAM TOPICAL at 05:40

## 2017-12-24 RX ADMIN — Medication 5 MILLIGRAM(S): at 22:46

## 2017-12-24 RX ADMIN — Medication 10 MILLIGRAM(S): at 17:59

## 2017-12-24 RX ADMIN — Medication 50 MILLIGRAM(S): at 05:40

## 2017-12-24 RX ADMIN — Medication 50 MILLIGRAM(S): at 00:10

## 2017-12-24 RX ADMIN — PANTOPRAZOLE SODIUM 40 MILLIGRAM(S): 20 TABLET, DELAYED RELEASE ORAL at 05:39

## 2017-12-24 NOTE — PROGRESS NOTE ADULT - SUBJECTIVE AND OBJECTIVE BOX
o/n: BRAYDEN, VSS  12/23: no events; wound vac intact; o/n: BRAYDEN, VSS  12/23: no events; wound vac intact;     SUBJECTIVE: Patient seen and examined bedside by surgery team. No acute events overnight. Denies fever, chills, nausea, emesis, chest pain, dyspnea, abdominal pain.     enoxaparin Injectable 40 milliGRAM(s) SubCutaneous two times a day  heparin  flush 100 Units/mL Injectable 100 Unit(s) IV Push every other day  heparin  flush 100 Units/mL Injectable 100 Unit(s) IV Push every other day  losartan 50 milliGRAM(s) Oral daily  vancomycin  IVPB 750 milliGRAM(s) IV Intermittent every 12 hours      Vital Signs Last 24 Hrs  T(C): 36.3 (24 Dec 2017 05:24), Max: 37.3 (23 Dec 2017 13:26)  T(F): 97.4 (24 Dec 2017 05:24), Max: 99.1 (23 Dec 2017 13:26)  HR: 67 (24 Dec 2017 05:24) (67 - 74)  BP: 143/78 (24 Dec 2017 05:24) (124/77 - 148/84)  BP(mean): --  RR: 17 (24 Dec 2017 05:24) (16 - 17)  SpO2: 95% (24 Dec 2017 05:24) (94% - 95%)  I&O's Detail    23 Dec 2017 07:01  -  24 Dec 2017 07:00  --------------------------------------------------------  IN:    fat emulsion (Plant Based) 20% Infusion: 187.2 mL    Solution: 150 mL    TPN (Total Parenteral Nutrition): 1679 mL  Total IN: 2016.2 mL    OUT:    Drain: 550 mL    Voided: 400 mL  Total OUT: 950 mL    Total NET: 1066.2 mL            Physical Exam  General: NAD, alert, interactive, appears comfortable  C/V: NSR  Pulm: Nonlabored breathing, no respiratory distress  Abd: softly distended, NT/ND.  Extrem: WWP, no edema, SCDs in place        LABS:                        10.0   4.3   )-----------( 223      ( 24 Dec 2017 07:21 )             32.6     12-23    141  |  103  |  38<H>  ----------------------------<  123<H>  3.8   |  28  |  0.59    Ca    9.4      23 Dec 2017 06:46  Phos  3.2     12-23  Mg     2.1     12-23

## 2017-12-24 NOTE — PROGRESS NOTE ADULT - ASSESSMENT
57 F s/p  SBR, fistula resection, esophagojejunostomy revision w/ post-op course complicated by RTOR for distal anastomosis breakdown, ATN, acute respiratory failure, & septic shock, MRSA bacteremia which resolved. Currently for wound care management.     CLD/TPN   Pain/nausea control - only benadryl at midnight and dilaudid 0.25mg during PT session   IS/OOB with PT daily  ISS  Vanco restarted (12/1 - stop date: 12/25), Nystatin powder  Benadryl for abdominal pruritus  SCDs, lovenox (therapeutic), OOBA  Lines :  L left subclavian line (10/4-12/2), L IJ (12/2--)  AM labs, weekly albumin, pre-albumin and triglyceride (every friday).  Wet to dry dressing in place  Colostomy w/ suction

## 2017-12-25 LAB
ALBUMIN SERPL ELPH-MCNC: 2.7 G/DL — LOW (ref 3.3–5)
ALP SERPL-CCNC: 205 U/L — HIGH (ref 40–120)
ALT FLD-CCNC: 25 U/L — SIGNIFICANT CHANGE UP (ref 10–45)
ANION GAP SERPL CALC-SCNC: 8 MMOL/L — SIGNIFICANT CHANGE UP (ref 5–17)
AST SERPL-CCNC: 19 U/L — SIGNIFICANT CHANGE UP (ref 10–40)
BILIRUB SERPL-MCNC: 0.5 MG/DL — SIGNIFICANT CHANGE UP (ref 0.2–1.2)
BUN SERPL-MCNC: 33 MG/DL — HIGH (ref 7–23)
CALCIUM SERPL-MCNC: 9.2 MG/DL — SIGNIFICANT CHANGE UP (ref 8.4–10.5)
CHLORIDE SERPL-SCNC: 99 MMOL/L — SIGNIFICANT CHANGE UP (ref 96–108)
CO2 SERPL-SCNC: 28 MMOL/L — SIGNIFICANT CHANGE UP (ref 22–31)
CREAT SERPL-MCNC: 0.61 MG/DL — SIGNIFICANT CHANGE UP (ref 0.5–1.3)
GLUCOSE BLDC GLUCOMTR-MCNC: 102 MG/DL — HIGH (ref 70–99)
GLUCOSE BLDC GLUCOMTR-MCNC: 108 MG/DL — HIGH (ref 70–99)
GLUCOSE BLDC GLUCOMTR-MCNC: 110 MG/DL — HIGH (ref 70–99)
GLUCOSE SERPL-MCNC: 133 MG/DL — HIGH (ref 70–99)
HCT VFR BLD CALC: 32.9 % — LOW (ref 34.5–45)
HGB BLD-MCNC: 10.1 G/DL — LOW (ref 11.5–15.5)
MAGNESIUM SERPL-MCNC: 2 MG/DL — SIGNIFICANT CHANGE UP (ref 1.6–2.6)
MCHC RBC-ENTMCNC: 27.1 PG — SIGNIFICANT CHANGE UP (ref 27–34)
MCHC RBC-ENTMCNC: 30.7 G/DL — LOW (ref 32–36)
MCV RBC AUTO: 88.2 FL — SIGNIFICANT CHANGE UP (ref 80–100)
PHOSPHATE SERPL-MCNC: 3.5 MG/DL — SIGNIFICANT CHANGE UP (ref 2.5–4.5)
PLATELET # BLD AUTO: 207 K/UL — SIGNIFICANT CHANGE UP (ref 150–400)
POTASSIUM SERPL-MCNC: 3.8 MMOL/L — SIGNIFICANT CHANGE UP (ref 3.5–5.3)
POTASSIUM SERPL-SCNC: 3.8 MMOL/L — SIGNIFICANT CHANGE UP (ref 3.5–5.3)
PROT SERPL-MCNC: 6.4 G/DL — SIGNIFICANT CHANGE UP (ref 6–8.3)
RBC # BLD: 3.73 M/UL — LOW (ref 3.8–5.2)
RBC # FLD: 16.3 % — SIGNIFICANT CHANGE UP (ref 10.3–16.9)
SODIUM SERPL-SCNC: 135 MMOL/L — SIGNIFICANT CHANGE UP (ref 135–145)
VANCOMYCIN TROUGH SERPL-MCNC: 18.5 UG/ML — SIGNIFICANT CHANGE UP (ref 10–20)
WBC # BLD: 4.1 K/UL — SIGNIFICANT CHANGE UP (ref 3.8–10.5)
WBC # FLD AUTO: 4.1 K/UL — SIGNIFICANT CHANGE UP (ref 3.8–10.5)

## 2017-12-25 RX ORDER — I.V. FAT EMULSION 20 G/100ML
250 EMULSION INTRAVENOUS ONCE
Qty: 0 | Refills: 0 | Status: COMPLETED | OUTPATIENT
Start: 2017-12-25 | End: 2017-12-25

## 2017-12-25 RX ORDER — ELECTROLYTE SOLUTION,INJ
1 VIAL (ML) INTRAVENOUS
Qty: 0 | Refills: 0 | Status: DISCONTINUED | OUTPATIENT
Start: 2017-12-25 | End: 2017-12-25

## 2017-12-25 RX ADMIN — OCTREOTIDE ACETATE 450 MICROGRAM(S): 200 INJECTION, SOLUTION INTRAVENOUS; SUBCUTANEOUS at 11:44

## 2017-12-25 RX ADMIN — Medication 10 MILLIGRAM(S): at 17:10

## 2017-12-25 RX ADMIN — PANTOPRAZOLE SODIUM 40 MILLIGRAM(S): 20 TABLET, DELAYED RELEASE ORAL at 05:48

## 2017-12-25 RX ADMIN — NYSTATIN CREAM 1 APPLICATION(S): 100000 CREAM TOPICAL at 17:10

## 2017-12-25 RX ADMIN — ENOXAPARIN SODIUM 40 MILLIGRAM(S): 100 INJECTION SUBCUTANEOUS at 17:10

## 2017-12-25 RX ADMIN — Medication 50 MILLIGRAM(S): at 05:47

## 2017-12-25 RX ADMIN — Medication 50 MILLIGRAM(S): at 11:43

## 2017-12-25 RX ADMIN — ESCITALOPRAM OXALATE 20 MILLIGRAM(S): 10 TABLET, FILM COATED ORAL at 21:03

## 2017-12-25 RX ADMIN — Medication 1 APPLICATION(S): at 05:48

## 2017-12-25 RX ADMIN — Medication 5 MILLIGRAM(S): at 21:03

## 2017-12-25 RX ADMIN — GABAPENTIN 100 MILLIGRAM(S): 400 CAPSULE ORAL at 17:10

## 2017-12-25 RX ADMIN — Medication 325 MILLIGRAM(S): at 21:02

## 2017-12-25 RX ADMIN — Medication 50 MILLIGRAM(S): at 17:10

## 2017-12-25 RX ADMIN — Medication 150 MILLIGRAM(S): at 11:44

## 2017-12-25 RX ADMIN — CALCITRIOL 1 MICROGRAM(S): 0.5 CAPSULE ORAL at 11:44

## 2017-12-25 RX ADMIN — Medication 50 MILLIGRAM(S): at 21:03

## 2017-12-25 RX ADMIN — ONDANSETRON 4 MILLIGRAM(S): 8 TABLET, FILM COATED ORAL at 21:03

## 2017-12-25 RX ADMIN — Medication 10 MILLIGRAM(S): at 05:48

## 2017-12-25 RX ADMIN — NYSTATIN CREAM 1 APPLICATION(S): 100000 CREAM TOPICAL at 05:48

## 2017-12-25 RX ADMIN — Medication 1 EACH: at 17:10

## 2017-12-25 RX ADMIN — LOSARTAN POTASSIUM 50 MILLIGRAM(S): 100 TABLET, FILM COATED ORAL at 05:48

## 2017-12-25 RX ADMIN — I.V. FAT EMULSION 20.83 MILLILITER(S): 20 EMULSION INTRAVENOUS at 21:03

## 2017-12-25 RX ADMIN — GABAPENTIN 100 MILLIGRAM(S): 400 CAPSULE ORAL at 05:48

## 2017-12-25 RX ADMIN — ENOXAPARIN SODIUM 40 MILLIGRAM(S): 100 INJECTION SUBCUTANEOUS at 05:48

## 2017-12-25 RX ADMIN — Medication 325 MILLIGRAM(S): at 22:02

## 2017-12-25 NOTE — PROGRESS NOTE ADULT - SUBJECTIVE AND OBJECTIVE BOX
o/n: VSS  12/24: vac changed, vanc tr 15.9    Surgeries:  7/24: SBR resection, fistula resection, revision of esophagogegunostomy drain through esophageal hiatus in R mediastinum  7/29: Ex-lap, SB anastamosis in BP limb breakdown with succus throughout abdomen  8/1: abd washout, drainage of abscess, biologic mesh placement, closure of abdominal wall, vac and retention suture  8/7: abd washout, mesh removal, frozen abd noted, LLQ CHECO replaced with benson drain  8/10: leak noted from previous anastamosis site on L side, mid abdomen malecot drain placed in enterotomy, JPs x 2 placed, necrotic fascia debrided, vicryl mesh sutured to fascia circumferentially, xeroform over mesh then vac dressing placed	    SUBJECTIVE:  Flatus: [ ] YES [ ] NO             Bowel Movement: [ ] YES [ ] NO  Pain (0-10):            Pain Control Adequate: [ ] YES [ ] NO  Nausea: [ ] YES [ ] NO            Vomiting: [ ] YES [ ] NO  Diarrhea: [ ] YES [ ] NO         Constipation: [ ] YES [ ] NO     Chest Pain: [ ] YES [ ] NO    SOB:  [ ] YES [ ] NO    MEDICATIONS  (STANDING):  bismuth subsalicylate Liquid 30 milliLiter(s) Oral daily  calcitriol Injectable 1 MICROGram(s) IV Push <User Schedule>  collagenase Ointment 1 Application(s) Topical daily  cyanocobalamin Injectable 1000 MICROGram(s) SubCutaneous every 7 days  dextrose 5%. 1000 milliLiter(s) (50 mL/Hr) IV Continuous <Continuous>  dextrose 50% Injectable 25 Gram(s) IV Push once  dextrose 50% Injectable 12.5 Gram(s) IV Push once  diazepam    Tablet 5 milliGRAM(s) Oral at bedtime  diphenhydrAMINE   Capsule 50 milliGRAM(s) Oral every 6 hours  diphenhydrAMINE   Injectable 50 milliGRAM(s) IV Push at bedtime  enoxaparin Injectable 40 milliGRAM(s) SubCutaneous two times a day  escitalopram 20 milliGRAM(s) Oral daily  gabapentin 100 milliGRAM(s) Oral two times a day  heparin  flush 100 Units/mL Injectable 100 Unit(s) IV Push every other day  heparin  flush 100 Units/mL Injectable 100 Unit(s) IV Push every other day  insulin lispro (HumaLOG) corrective regimen sliding scale   SubCutaneous every 6 hours  lidocaine   Patch 1 Patch Transdermal daily  losartan 50 milliGRAM(s) Oral daily  nystatin Powder 1 Application(s) Topical two times a day  octreotide  Injectable 450 MICROGram(s) IV Push daily  oxybutynin 10 milliGRAM(s) Oral two times a day  pantoprazole  Injectable 40 milliGRAM(s) IV Push daily  Parenteral Nutrition - Adult 1 Each (73 mL/Hr) TPN Continuous <Continuous>  sodium chloride 0.9% lock flush 20 milliLiter(s) IV Push once  vancomycin  IVPB 750 milliGRAM(s) IV Intermittent every 12 hours    MEDICATIONS  (PRN):  acetaminophen   Tablet. 325 milliGRAM(s) Oral every 4 hours PRN Moderate Pain (4 - 6)  ondansetron Injectable 4 milliGRAM(s) IV Push every 6 hours PRN Nausea and/or Vomiting  sodium chloride 0.9% lock flush 10 milliLiter(s) IV Push every 1 hour PRN After each medication administration  sodium chloride 0.9% lock flush 10 milliLiter(s) IV Push every 12 hours PRN Lumen of catheter NOT used      Vital Signs Last 24 Hrs  T(C): 35.9 (25 Dec 2017 05:57), Max: 37.1 (24 Dec 2017 14:06)  T(F): 96.7 (25 Dec 2017 05:57), Max: 98.7 (24 Dec 2017 14:06)  HR: 67 (25 Dec 2017 05:57) (67 - 76)  BP: 126/80 (25 Dec 2017 05:57) (122/77 - 141/89)  BP(mean): --  RR: 17 (25 Dec 2017 05:57) (16 - 17)  SpO2: 95% (25 Dec 2017 05:57) (95% - 97%)    PHYSICAL EXAM:      Constitutional: A&Ox3    Breasts:    Respiratory: non labored breathing, no respiratory distress    Cardiovascular: NSR, RRR    Gastrointestinal:                 Incision:    Genitourinary:    Extremities: (-) edema                  I&O's Detail    24 Dec 2017 07:01  -  25 Dec 2017 07:00  --------------------------------------------------------  IN:    TPN (Total Parenteral Nutrition): 1752 mL  Total IN: 1752 mL    OUT:    Drain: 50 mL  Total OUT: 50 mL    Total NET: 1702 mL          LABS:                        10.0   4.3   )-----------( 223      ( 24 Dec 2017 07:21 )             32.6     12-24    141  |  103  |  30<H>  ----------------------------<  136<H>  4.0   |  27  |  0.55    Ca    9.5      24 Dec 2017 07:20  Phos  3.0     12-24  Mg     2.0     12-24    TPro  6.5  /  Alb  3.0<L>  /  TBili  0.5  /  DBili  x   /  AST  22  /  ALT  28  /  AlkPhos  202<H>  12-24          RADIOLOGY & ADDITIONAL STUDIES: o/n: VSS  12/24: vac changed, vanc tr 15.9    Surgeries:  7/24: SBR resection, fistula resection, revision of esophagogegunostomy drain through esophageal hiatus in R mediastinum  7/29: Ex-lap, SB anastamosis in BP limb breakdown with succus throughout abdomen  8/1: abd washout, drainage of abscess, biologic mesh placement, closure of abdominal wall, vac and retention suture  8/7: abd washout, mesh removal, frozen abd noted, LLQ CHECO replaced with benson drain  8/10: leak noted from previous anastamosis site on L side, mid abdomen malecot drain placed in enterotomy, JPs x 2 placed, necrotic fascia debrided, vicryl mesh sutured to fascia circumferentially, xeroform over mesh then vac dressing placed	    SUBJECTIVE: Patient seen at bedside, denies any complaints.    MEDICATIONS  (STANDING):  bismuth subsalicylate Liquid 30 milliLiter(s) Oral daily  calcitriol Injectable 1 MICROGram(s) IV Push <User Schedule>  collagenase Ointment 1 Application(s) Topical daily  cyanocobalamin Injectable 1000 MICROGram(s) SubCutaneous every 7 days  dextrose 5%. 1000 milliLiter(s) (50 mL/Hr) IV Continuous <Continuous>  dextrose 50% Injectable 25 Gram(s) IV Push once  dextrose 50% Injectable 12.5 Gram(s) IV Push once  diazepam    Tablet 5 milliGRAM(s) Oral at bedtime  diphenhydrAMINE   Capsule 50 milliGRAM(s) Oral every 6 hours  diphenhydrAMINE   Injectable 50 milliGRAM(s) IV Push at bedtime  enoxaparin Injectable 40 milliGRAM(s) SubCutaneous two times a day  escitalopram 20 milliGRAM(s) Oral daily  gabapentin 100 milliGRAM(s) Oral two times a day  heparin  flush 100 Units/mL Injectable 100 Unit(s) IV Push every other day  heparin  flush 100 Units/mL Injectable 100 Unit(s) IV Push every other day  insulin lispro (HumaLOG) corrective regimen sliding scale   SubCutaneous every 6 hours  lidocaine   Patch 1 Patch Transdermal daily  losartan 50 milliGRAM(s) Oral daily  nystatin Powder 1 Application(s) Topical two times a day  octreotide  Injectable 450 MICROGram(s) IV Push daily  oxybutynin 10 milliGRAM(s) Oral two times a day  pantoprazole  Injectable 40 milliGRAM(s) IV Push daily  Parenteral Nutrition - Adult 1 Each (73 mL/Hr) TPN Continuous <Continuous>  sodium chloride 0.9% lock flush 20 milliLiter(s) IV Push once  vancomycin  IVPB 750 milliGRAM(s) IV Intermittent every 12 hours    MEDICATIONS  (PRN):  acetaminophen   Tablet. 325 milliGRAM(s) Oral every 4 hours PRN Moderate Pain (4 - 6)  ondansetron Injectable 4 milliGRAM(s) IV Push every 6 hours PRN Nausea and/or Vomiting  sodium chloride 0.9% lock flush 10 milliLiter(s) IV Push every 1 hour PRN After each medication administration  sodium chloride 0.9% lock flush 10 milliLiter(s) IV Push every 12 hours PRN Lumen of catheter NOT used      Vital Signs Last 24 Hrs  T(C): 35.9 (25 Dec 2017 05:57), Max: 37.1 (24 Dec 2017 14:06)  T(F): 96.7 (25 Dec 2017 05:57), Max: 98.7 (24 Dec 2017 14:06)  HR: 67 (25 Dec 2017 05:57) (67 - 76)  BP: 126/80 (25 Dec 2017 05:57) (122/77 - 141/89)  BP(mean): --  RR: 17 (25 Dec 2017 05:57) (16 - 17)  SpO2: 95% (25 Dec 2017 05:57) (95% - 97%)    PHYSICAL EXAM:      Constitutional: A&Ox3      Respiratory: non labored breathing, no respiratory distress    Cardiovascular: NSR, RRR    Gastrointestinal: Soft, ND,NT, dressings intact, suction working                   Genitourinary: voiding    Extremities: (-) edema                  I&O's Detail    24 Dec 2017 07:01  -  25 Dec 2017 07:00  --------------------------------------------------------  IN:    TPN (Total Parenteral Nutrition): 1752 mL  Total IN: 1752 mL    OUT:    Drain: 50 mL  Total OUT: 50 mL    Total NET: 1702 mL          LABS:                        10.0   4.3   )-----------( 223      ( 24 Dec 2017 07:21 )             32.6     12-24    141  |  103  |  30<H>  ----------------------------<  136<H>  4.0   |  27  |  0.55    Ca    9.5      24 Dec 2017 07:20  Phos  3.0     12-24  Mg     2.0     12-24    TPro  6.5  /  Alb  3.0<L>  /  TBili  0.5  /  DBili  x   /  AST  22  /  ALT  28  /  AlkPhos  202<H>  12-24          RADIOLOGY & ADDITIONAL STUDIES:

## 2017-12-25 NOTE — PROGRESS NOTE ADULT - SUBJECTIVE AND OBJECTIVE BOX
On interval followup, the left int jugular catheter site is clean, dry, non-inflamed and non-tender. T 99 range. Notes and cultures are followed. The change for a loose dressing was discussed with the nurse.

## 2017-12-25 NOTE — PROGRESS NOTE ADULT - ASSESSMENT
57 F s/p  SBR, fistula resection, esophagojejunostomy revision w/ post-op course complicated by RTOR for distal anastomosis breakdown, ATN, acute respiratory failure, & septic shock, MRSA bacteremia which resolved. Currently for wound care management.     NPO (sips and chips)/TPN   Pain/nausea control - only benadryl at midnight and dilaudid 0.25mg during PT session   IS/OOB with PT daily  ISS  Vanco restarted (12/1 - stop date: 12/25), Nystatin powder  Benadryl for abdominal pruritus  SCDs, lovenox (therapeutic), OOBA  Lines :  L left subclavian line (10/4-12/2), L IJ (12/2--)  AM labs, weekly albumin, pre-albumin and triglyceride (every friday).  Wet to dry dressing in place  Colostomy w/ suction

## 2017-12-26 LAB
ANION GAP SERPL CALC-SCNC: 9 MMOL/L — SIGNIFICANT CHANGE UP (ref 5–17)
BUN SERPL-MCNC: 33 MG/DL — HIGH (ref 7–23)
CALCIUM SERPL-MCNC: 9.5 MG/DL — SIGNIFICANT CHANGE UP (ref 8.4–10.5)
CHLORIDE SERPL-SCNC: 104 MMOL/L — SIGNIFICANT CHANGE UP (ref 96–108)
CO2 SERPL-SCNC: 28 MMOL/L — SIGNIFICANT CHANGE UP (ref 22–31)
CREAT SERPL-MCNC: 0.58 MG/DL — SIGNIFICANT CHANGE UP (ref 0.5–1.3)
GLUCOSE BLDC GLUCOMTR-MCNC: 101 MG/DL — HIGH (ref 70–99)
GLUCOSE BLDC GLUCOMTR-MCNC: 104 MG/DL — HIGH (ref 70–99)
GLUCOSE BLDC GLUCOMTR-MCNC: 111 MG/DL — HIGH (ref 70–99)
GLUCOSE BLDC GLUCOMTR-MCNC: 130 MG/DL — HIGH (ref 70–99)
GLUCOSE BLDC GLUCOMTR-MCNC: 95 MG/DL — SIGNIFICANT CHANGE UP (ref 70–99)
GLUCOSE SERPL-MCNC: 118 MG/DL — HIGH (ref 70–99)
HCT VFR BLD CALC: 34.1 % — LOW (ref 34.5–45)
HGB BLD-MCNC: 10.3 G/DL — LOW (ref 11.5–15.5)
MAGNESIUM SERPL-MCNC: 2.1 MG/DL — SIGNIFICANT CHANGE UP (ref 1.6–2.6)
MCHC RBC-ENTMCNC: 26.8 PG — LOW (ref 27–34)
MCHC RBC-ENTMCNC: 30.2 G/DL — LOW (ref 32–36)
MCV RBC AUTO: 88.6 FL — SIGNIFICANT CHANGE UP (ref 80–100)
PHOSPHATE SERPL-MCNC: 3.2 MG/DL — SIGNIFICANT CHANGE UP (ref 2.5–4.5)
PLATELET # BLD AUTO: 208 K/UL — SIGNIFICANT CHANGE UP (ref 150–400)
POTASSIUM SERPL-MCNC: 3.8 MMOL/L — SIGNIFICANT CHANGE UP (ref 3.5–5.3)
POTASSIUM SERPL-SCNC: 3.8 MMOL/L — SIGNIFICANT CHANGE UP (ref 3.5–5.3)
RBC # BLD: 3.85 M/UL — SIGNIFICANT CHANGE UP (ref 3.8–5.2)
RBC # FLD: 16.3 % — SIGNIFICANT CHANGE UP (ref 10.3–16.9)
SODIUM SERPL-SCNC: 141 MMOL/L — SIGNIFICANT CHANGE UP (ref 135–145)
WBC # BLD: 4.4 K/UL — SIGNIFICANT CHANGE UP (ref 3.8–10.5)
WBC # FLD AUTO: 4.4 K/UL — SIGNIFICANT CHANGE UP (ref 3.8–10.5)

## 2017-12-26 RX ORDER — ELECTROLYTE SOLUTION,INJ
1 VIAL (ML) INTRAVENOUS
Qty: 0 | Refills: 0 | Status: DISCONTINUED | OUTPATIENT
Start: 2017-12-26 | End: 2017-12-26

## 2017-12-26 RX ADMIN — Medication 1 EACH: at 17:08

## 2017-12-26 RX ADMIN — NYSTATIN CREAM 1 APPLICATION(S): 100000 CREAM TOPICAL at 05:41

## 2017-12-26 RX ADMIN — PANTOPRAZOLE SODIUM 40 MILLIGRAM(S): 20 TABLET, DELAYED RELEASE ORAL at 05:03

## 2017-12-26 RX ADMIN — ENOXAPARIN SODIUM 40 MILLIGRAM(S): 100 INJECTION SUBCUTANEOUS at 05:03

## 2017-12-26 RX ADMIN — NYSTATIN CREAM 1 APPLICATION(S): 100000 CREAM TOPICAL at 17:08

## 2017-12-26 RX ADMIN — Medication 325 MILLIGRAM(S): at 06:02

## 2017-12-26 RX ADMIN — GABAPENTIN 100 MILLIGRAM(S): 400 CAPSULE ORAL at 05:02

## 2017-12-26 RX ADMIN — GABAPENTIN 100 MILLIGRAM(S): 400 CAPSULE ORAL at 17:08

## 2017-12-26 RX ADMIN — Medication 5 MILLIGRAM(S): at 21:02

## 2017-12-26 RX ADMIN — Medication 10 MILLIGRAM(S): at 05:02

## 2017-12-26 RX ADMIN — ENOXAPARIN SODIUM 40 MILLIGRAM(S): 100 INJECTION SUBCUTANEOUS at 17:08

## 2017-12-26 RX ADMIN — Medication 325 MILLIGRAM(S): at 21:02

## 2017-12-26 RX ADMIN — Medication 325 MILLIGRAM(S): at 05:02

## 2017-12-26 RX ADMIN — Medication 10 MILLIGRAM(S): at 17:08

## 2017-12-26 RX ADMIN — ESCITALOPRAM OXALATE 20 MILLIGRAM(S): 10 TABLET, FILM COATED ORAL at 21:02

## 2017-12-26 RX ADMIN — LOSARTAN POTASSIUM 50 MILLIGRAM(S): 100 TABLET, FILM COATED ORAL at 05:02

## 2017-12-26 RX ADMIN — Medication 50 MILLIGRAM(S): at 05:02

## 2017-12-26 RX ADMIN — OCTREOTIDE ACETATE 450 MICROGRAM(S): 200 INJECTION, SOLUTION INTRAVENOUS; SUBCUTANEOUS at 11:35

## 2017-12-26 RX ADMIN — Medication 50 MILLIGRAM(S): at 11:35

## 2017-12-26 RX ADMIN — Medication 50 MILLIGRAM(S): at 21:02

## 2017-12-26 RX ADMIN — Medication 100 UNIT(S): at 11:35

## 2017-12-26 RX ADMIN — Medication 1 APPLICATION(S): at 05:41

## 2017-12-26 RX ADMIN — Medication 325 MILLIGRAM(S): at 22:02

## 2017-12-26 RX ADMIN — Medication 50 MILLIGRAM(S): at 17:08

## 2017-12-26 RX ADMIN — ONDANSETRON 4 MILLIGRAM(S): 8 TABLET, FILM COATED ORAL at 05:03

## 2017-12-26 NOTE — CHART NOTE - NSCHARTNOTEFT_GEN_A_CORE
Admitting Diagnosis:   Patient is a 57y old  Female who presents with a chief complaint of enterocutaneous fistula (24 Jul 2017 14:15)      PAST MEDICAL & SURGICAL HISTORY:  Reflux  Obesity  DVT (deep venous thrombosis): 2013 neck  Diverticulitis  Hypertension  Elective surgery: abodominal wall surgery  dec 2016  Elective surgery: NOV 2016 gastric bypass revision  Gastric bypass status for obesity: gastric sleeve, 6/2012  S/P breast biopsy: 2011, left  S/P colon resection: 2011  S/P knee surgery: repair 2009, 2011  right; left  Umbilical hernia: 2000  S/P appendectomy: 1975  S/P tonsillectomy: 1967      Current Nutrition Order:  NPO except ice chips and water  >> TPN via central venous line:  1752 TV (73ml/hr); 355g D, 140g AA, 50g 20% lipids (2267kcal calories): (2267 kcal, 2.7g/kg IBW pro, GIR 3.16 based on new standing wt)    PO Intake: Good (%) [   ]  Fair (50-75%) [   ] Poor (<25%) [   ] N/A   Pt continues to request clear liquid options.     GI Issues: No reported GI distress at this time.    Pain: No C/o pain.    Skin Integrity: sacrum un-stageable PU; sacral abrasion; abd wound--> NGtube placed w/ sponge through pouch into fistula for suction      Labs:   12-26    141  |  104  |  33<H>  ----------------------------<  118<H>  3.8   |  28  |  0.58    Ca    9.5      26 Dec 2017 06:51  Phos  3.2     12-26  Mg     2.1     12-26    TPro  6.4  /  Alb  2.7<L>  /  TBili  0.5  /  DBili  x   /  AST  19  /  ALT  25  /  AlkPhos  205<H>  12-25    CAPILLARY BLOOD GLUCOSE      POCT Blood Glucose.: 104 mg/dL (26 Dec 2017 11:42)  POCT Blood Glucose.: 111 mg/dL (26 Dec 2017 05:13)  POCT Blood Glucose.: 130 mg/dL (26 Dec 2017 00:33)  POCT Blood Glucose.: 102 mg/dL (25 Dec 2017 21:43)      Medications:  MEDICATIONS  (STANDING):  bismuth subsalicylate Liquid 30 milliLiter(s) Oral daily  calcitriol Injectable 1 MICROGram(s) IV Push <User Schedule>  collagenase Ointment 1 Application(s) Topical daily  cyanocobalamin Injectable 1000 MICROGram(s) SubCutaneous every 7 days  dextrose 5%. 1000 milliLiter(s) (50 mL/Hr) IV Continuous <Continuous>  dextrose 50% Injectable 25 Gram(s) IV Push once  dextrose 50% Injectable 12.5 Gram(s) IV Push once  diazepam    Tablet 5 milliGRAM(s) Oral at bedtime  diphenhydrAMINE   Capsule 50 milliGRAM(s) Oral every 6 hours  diphenhydrAMINE   Injectable 50 milliGRAM(s) IV Push at bedtime  enoxaparin Injectable 40 milliGRAM(s) SubCutaneous two times a day  escitalopram 20 milliGRAM(s) Oral daily  gabapentin 100 milliGRAM(s) Oral two times a day  heparin  flush 100 Units/mL Injectable 100 Unit(s) IV Push every other day  heparin  flush 100 Units/mL Injectable 100 Unit(s) IV Push every other day  insulin lispro (HumaLOG) corrective regimen sliding scale   SubCutaneous every 6 hours  lidocaine   Patch 1 Patch Transdermal daily  losartan 50 milliGRAM(s) Oral daily  nystatin Powder 1 Application(s) Topical two times a day  octreotide  Injectable 450 MICROGram(s) IV Push daily  oxybutynin 10 milliGRAM(s) Oral two times a day  pantoprazole  Injectable 40 milliGRAM(s) IV Push daily  Parenteral Nutrition - Adult 1 Each (73 mL/Hr) TPN Continuous <Continuous>  Parenteral Nutrition - Adult 1 Each (73 mL/Hr) TPN Continuous <Continuous>  sodium chloride 0.9% lock flush 20 milliLiter(s) IV Push once    MEDICATIONS  (PRN):  acetaminophen   Tablet. 325 milliGRAM(s) Oral every 4 hours PRN Moderate Pain (4 - 6)  ondansetron Injectable 4 milliGRAM(s) IV Push every 6 hours PRN Nausea and/or Vomiting  sodium chloride 0.9% lock flush 10 milliLiter(s) IV Push every 1 hour PRN After each medication administration  sodium chloride 0.9% lock flush 10 milliLiter(s) IV Push every 12 hours PRN Lumen of catheter NOT used      Weight:  Daily     Daily     Weight Change:     Estimated energy needs:     Subjective:     Previous Nutrition Diagnosis:    Active [   ]  Resolved [   ]    If resolved, new PES:     Goal:    Recommendations:    Education:     Risk Level: High [   ] Moderate [   ] Low [   ] Admitting Diagnosis:   Patient is a 57y old  Female who presents with a chief complaint of enterocutaneous fistula (24 Jul 2017 14:15)      PAST MEDICAL & SURGICAL HISTORY:  Reflux  Obesity  DVT (deep venous thrombosis): 2013 neck  Diverticulitis  Hypertension  Elective surgery: abodominal wall surgery  dec 2016  Elective surgery: NOV 2016 gastric bypass revision  Gastric bypass status for obesity: gastric sleeve, 6/2012  S/P breast biopsy: 2011, left  S/P colon resection: 2011  S/P knee surgery: repair 2009, 2011  right; left  Umbilical hernia: 2000  S/P appendectomy: 1975  S/P tonsillectomy: 1967      Current Nutrition Order:  NPO except ice chips and water  >> TPN via central venous line:  1752 TV (73ml/hr); 355g D, 140g AA, 50g 20% lipids (2267kcal calories): (2267 kcal, 2.7g/kg IBW pro, GIR 3.16 based on new standing wt)    PO Intake: Good (%) [   ]  Fair (50-75%) [   ] Poor (<25%) [   ] N/A   Pt continues to request clear liquid options.     GI Issues: No reported GI distress at this time.    Pain: No C/o pain.    Skin Integrity: sacrum un-stageable PU; sacral abrasion; abd wound--> NGtube placed w/ sponge through pouch into fistula for suction      Labs:   12-26    141  |  104  |  33<H>  ----------------------------<  118<H>  3.8   |  28  |  0.58    Ca    9.5      26 Dec 2017 06:51  Phos  3.2     12-26  Mg     2.1     12-26    TPro  6.4  /  Alb  2.7<L>  /  TBili  0.5  /  DBili  x   /  AST  19  /  ALT  25  /  AlkPhos  205<H>  12-25    CAPILLARY BLOOD GLUCOSE      POCT Blood Glucose.: 104 mg/dL (26 Dec 2017 11:42)  POCT Blood Glucose.: 111 mg/dL (26 Dec 2017 05:13)  POCT Blood Glucose.: 130 mg/dL (26 Dec 2017 00:33)  POCT Blood Glucose.: 102 mg/dL (25 Dec 2017 21:43)      Medications:  MEDICATIONS  (STANDING):  bismuth subsalicylate Liquid 30 milliLiter(s) Oral daily  calcitriol Injectable 1 MICROGram(s) IV Push <User Schedule>  collagenase Ointment 1 Application(s) Topical daily  cyanocobalamin Injectable 1000 MICROGram(s) SubCutaneous every 7 days  dextrose 5%. 1000 milliLiter(s) (50 mL/Hr) IV Continuous <Continuous>  dextrose 50% Injectable 25 Gram(s) IV Push once  dextrose 50% Injectable 12.5 Gram(s) IV Push once  diazepam    Tablet 5 milliGRAM(s) Oral at bedtime  diphenhydrAMINE   Capsule 50 milliGRAM(s) Oral every 6 hours  diphenhydrAMINE   Injectable 50 milliGRAM(s) IV Push at bedtime  enoxaparin Injectable 40 milliGRAM(s) SubCutaneous two times a day  escitalopram 20 milliGRAM(s) Oral daily  gabapentin 100 milliGRAM(s) Oral two times a day  heparin  flush 100 Units/mL Injectable 100 Unit(s) IV Push every other day  heparin  flush 100 Units/mL Injectable 100 Unit(s) IV Push every other day  insulin lispro (HumaLOG) corrective regimen sliding scale   SubCutaneous every 6 hours  lidocaine   Patch 1 Patch Transdermal daily  losartan 50 milliGRAM(s) Oral daily  nystatin Powder 1 Application(s) Topical two times a day  octreotide  Injectable 450 MICROGram(s) IV Push daily  oxybutynin 10 milliGRAM(s) Oral two times a day  pantoprazole  Injectable 40 milliGRAM(s) IV Push daily  Parenteral Nutrition - Adult 1 Each (73 mL/Hr) TPN Continuous <Continuous>  Parenteral Nutrition - Adult 1 Each (73 mL/Hr) TPN Continuous <Continuous>  sodium chloride 0.9% lock flush 20 milliLiter(s) IV Push once    MEDICATIONS  (PRN):  acetaminophen   Tablet. 325 milliGRAM(s) Oral every 4 hours PRN Moderate Pain (4 - 6)  ondansetron Injectable 4 milliGRAM(s) IV Push every 6 hours PRN Nausea and/or Vomiting  sodium chloride 0.9% lock flush 10 milliLiter(s) IV Push every 1 hour PRN After each medication administration  sodium chloride 0.9% lock flush 10 milliLiter(s) IV Push every 12 hours PRN Lumen of catheter NOT used      Weight:  172.5 (12/19)  171.5lb (12/14)  167lb (11/13)  164lb (10/17)  219lb (8/1)    Weight Change:   03/2016: 89kg  02/2017: 74kg   07/2017: 76kg  11/2017: 76kg   Pt currently weighs 172.5lbs per standing scale indicating a 5.5lb wt gain since last month. Pt doing PT during weigh-in    Estimated energy needs:   Estimated energy needs using 52 kg IBW:  increased needs per wound and pressure ulcer healing. needs calculated based on TPN as primary route of nutrition.  30-35 kcal/kg (4612-1500 kcal).   1.8-2 g/kg ( g protein).  30-35 mL/kg (9651-1645 mL fluid).     Subjective:   Nutrition course remains the same. Pt is NPO w/ ice chips and sips of water. pt.   No apparent GI distress. Will continue to follow wts to assess adjusting nutrient needs.  TPN bag infusing at 73ml/hr providing 355g Dex, 140g AA, no lipids today (1767 kcal, 2.7g pro/kg IBW pro, GIR 3.16-based on new standing wt).   Please update weights weekly to trend adequacy of nutrient intake via TPN.     Previous Nutrition Diagnosis: Increased nutrient needs RT increased demand for nutrients AEB wound and pressure ulcer healing   Active [  X]  Resolved [   ]    If resolved, new PES: N/A    Goal: Pt to meet >75% EER via tolerated route    Recommendations:  1) Continue w/ current TPN order as tolerated and assess feasibility of other routes w/wound healing  2) Monitor triglycerides & adjust lipids per MD discretion  3) Please continue to take standing weight for trending purposes  4) Consider OsCal tablet crushed w/ water/italian ice     Education: Understands meeting nutrient needs via TPN.    Risk Level: High [  X ] Moderate [   ] Low [   ].

## 2017-12-26 NOTE — PROGRESS NOTE ADULT - SUBJECTIVE AND OBJECTIVE BOX
o/n: BRAYDEN; vss  12/25: last day of vanco. VSS o/n: BRAYDEN; vss  12/25: last day of vanco. VSS    SUBJECTIVE: Patient seen and examined bedside by surgery team. No acute events overnight. Denies fever, chills, nausea, emesis, chest pain, dyspnea, abdominal pain.     enoxaparin Injectable 40 milliGRAM(s) SubCutaneous two times a day  heparin  flush 100 Units/mL Injectable 100 Unit(s) IV Push every other day  heparin  flush 100 Units/mL Injectable 100 Unit(s) IV Push every other day  losartan 50 milliGRAM(s) Oral daily      Vital Signs Last 24 Hrs  T(C): 36.5 (26 Dec 2017 05:29), Max: 37.5 (25 Dec 2017 14:15)  T(F): 97.7 (26 Dec 2017 05:29), Max: 99.5 (25 Dec 2017 14:15)  HR: 64 (26 Dec 2017 05:29) (64 - 81)  BP: 123/77 (26 Dec 2017 05:29) (123/77 - 147/87)  BP(mean): --  RR: 16 (26 Dec 2017 05:29) (16 - 18)  SpO2: 94% (26 Dec 2017 05:29) (93% - 95%)  I&O's Detail    25 Dec 2017 07:01  -  26 Dec 2017 07:00  --------------------------------------------------------  IN:    Fat Emulsion 20%: 249.6 mL    TPN (Total Parenteral Nutrition): 1752 mL  Total IN: 2001.6 mL    OUT:    Drain: 50 mL    Drain: 50 mL  Total OUT: 100 mL    Total NET: 1901.6 mL            Physical Exam  General: NAD, alert, interactive, appears comfortable  C/V: NSR  Pulm: Nonlabored breathing, no respiratory distress  Abd: softly distended, NT/ND.  Extrem: WWP, no edema, SCDs in place        LABS:                        10.3   4.4   )-----------( 208      ( 26 Dec 2017 06:51 )             34.1     12-26    141  |  104  |  33<H>  ----------------------------<  118<H>  3.8   |  28  |  0.58    Ca    9.5      26 Dec 2017 06:51  Phos  3.2     12-26  Mg     2.1     12-26    TPro  6.4  /  Alb  2.7<L>  /  TBili  0.5  /  DBili  x   /  AST  19  /  ALT  25  /  AlkPhos  205<H>  12-25

## 2017-12-26 NOTE — PROGRESS NOTE ADULT - ASSESSMENT
57 F s/p  SBR, fistula resection, esophagojejunostomy revision w/ post-op course complicated by RTOR for distal anastomosis breakdown, ATN, acute respiratory failure, & septic shock, MRSA bacteremia which resolved. Currently for wound care management.     NPO (sips and chips)/TPN   Pain/nausea control - only benadryl at midnight and dilaudid 0.25mg during PT session   IS/OOB with PT daily  ISS  Vanco restarted (12/1 - stop date: 12/25), Nystatin powder  Benadryl for abdominal pruritus  SCDs, lovenox (therapeutic), OOBA  Lines :  L left subclavian line (10/4-12/2), L IJ (12/2--)  AM labs, weekly albumin, pre-albumin and triglyceride (every friday).  Wet to dry dressing in place  Colostomy w/ suction 57 F s/p  SBR, fistula resection, esophagojejunostomy revision w/ post-op course complicated by RTOR for distal anastomosis breakdown, ATN, acute respiratory failure, & septic shock, MRSA bacteremia which resolved. Currently for wound care management.     NPO (sips and chips)/TPN   Pain/nausea control - only benadryl at midnight and dilaudid 0.25mg during PT session   IS/OOB with PT daily  ISS  Nystatin powder  Benadryl for abdominal pruritus  SCDs, lovenox (therapeutic), OOBA  Lines :  L left subclavian line (10/4-12/2), L IJ (12/2--)  AM labs, weekly albumin, pre-albumin and triglyceride (every friday).  Wet to dry dressing in place  Colostomy w/ suction

## 2017-12-27 LAB
ALBUMIN SERPL ELPH-MCNC: 2.9 G/DL — LOW (ref 3.3–5)
ALP SERPL-CCNC: 221 U/L — HIGH (ref 40–120)
ALT FLD-CCNC: 29 U/L — SIGNIFICANT CHANGE UP (ref 10–45)
ANION GAP SERPL CALC-SCNC: 9 MMOL/L — SIGNIFICANT CHANGE UP (ref 5–17)
AST SERPL-CCNC: 24 U/L — SIGNIFICANT CHANGE UP (ref 10–40)
BILIRUB SERPL-MCNC: 0.4 MG/DL — SIGNIFICANT CHANGE UP (ref 0.2–1.2)
BUN SERPL-MCNC: 35 MG/DL — HIGH (ref 7–23)
CALCIUM SERPL-MCNC: 9.3 MG/DL — SIGNIFICANT CHANGE UP (ref 8.4–10.5)
CHLORIDE SERPL-SCNC: 103 MMOL/L — SIGNIFICANT CHANGE UP (ref 96–108)
CO2 SERPL-SCNC: 29 MMOL/L — SIGNIFICANT CHANGE UP (ref 22–31)
CREAT SERPL-MCNC: 0.6 MG/DL — SIGNIFICANT CHANGE UP (ref 0.5–1.3)
GLUCOSE BLDC GLUCOMTR-MCNC: 108 MG/DL — HIGH (ref 70–99)
GLUCOSE BLDC GLUCOMTR-MCNC: 114 MG/DL — HIGH (ref 70–99)
GLUCOSE BLDC GLUCOMTR-MCNC: 115 MG/DL — HIGH (ref 70–99)
GLUCOSE BLDC GLUCOMTR-MCNC: 117 MG/DL — HIGH (ref 70–99)
GLUCOSE BLDC GLUCOMTR-MCNC: 129 MG/DL — HIGH (ref 70–99)
GLUCOSE SERPL-MCNC: 129 MG/DL — HIGH (ref 70–99)
GRAM STN FLD: SIGNIFICANT CHANGE UP
HCT VFR BLD CALC: 32.6 % — LOW (ref 34.5–45)
HGB BLD-MCNC: 9.9 G/DL — LOW (ref 11.5–15.5)
MAGNESIUM SERPL-MCNC: 2.2 MG/DL — SIGNIFICANT CHANGE UP (ref 1.6–2.6)
MCHC RBC-ENTMCNC: 26.6 PG — LOW (ref 27–34)
MCHC RBC-ENTMCNC: 30.4 G/DL — LOW (ref 32–36)
MCV RBC AUTO: 87.6 FL — SIGNIFICANT CHANGE UP (ref 80–100)
PHOSPHATE SERPL-MCNC: 3.2 MG/DL — SIGNIFICANT CHANGE UP (ref 2.5–4.5)
PLATELET # BLD AUTO: 200 K/UL — SIGNIFICANT CHANGE UP (ref 150–400)
POTASSIUM SERPL-MCNC: 3.9 MMOL/L — SIGNIFICANT CHANGE UP (ref 3.5–5.3)
POTASSIUM SERPL-SCNC: 3.9 MMOL/L — SIGNIFICANT CHANGE UP (ref 3.5–5.3)
PROT SERPL-MCNC: 6.5 G/DL — SIGNIFICANT CHANGE UP (ref 6–8.3)
RBC # BLD: 3.72 M/UL — LOW (ref 3.8–5.2)
RBC # FLD: 16.6 % — SIGNIFICANT CHANGE UP (ref 10.3–16.9)
SODIUM SERPL-SCNC: 141 MMOL/L — SIGNIFICANT CHANGE UP (ref 135–145)
SPECIMEN SOURCE: SIGNIFICANT CHANGE UP
WBC # BLD: 4 K/UL — SIGNIFICANT CHANGE UP (ref 3.8–10.5)
WBC # FLD AUTO: 4 K/UL — SIGNIFICANT CHANGE UP (ref 3.8–10.5)

## 2017-12-27 PROCEDURE — 99233 SBSQ HOSP IP/OBS HIGH 50: CPT

## 2017-12-27 RX ORDER — DIAZEPAM 5 MG
5 TABLET ORAL AT BEDTIME
Qty: 0 | Refills: 0 | Status: DISCONTINUED | OUTPATIENT
Start: 2017-12-27 | End: 2018-01-03

## 2017-12-27 RX ORDER — HYDROMORPHONE HYDROCHLORIDE 2 MG/ML
0.5 INJECTION INTRAMUSCULAR; INTRAVENOUS; SUBCUTANEOUS ONCE
Qty: 0 | Refills: 0 | Status: DISCONTINUED | OUTPATIENT
Start: 2017-12-27 | End: 2017-12-27

## 2017-12-27 RX ORDER — NYSTATIN CREAM 100000 [USP'U]/G
1 CREAM TOPICAL
Qty: 0 | Refills: 0 | Status: DISCONTINUED | OUTPATIENT
Start: 2017-12-27 | End: 2018-01-30

## 2017-12-27 RX ORDER — DIPHENHYDRAMINE HCL 50 MG
50 CAPSULE ORAL AT BEDTIME
Qty: 0 | Refills: 0 | Status: DISCONTINUED | OUTPATIENT
Start: 2017-12-27 | End: 2017-12-29

## 2017-12-27 RX ORDER — OCTREOTIDE ACETATE 200 UG/ML
450 INJECTION, SOLUTION INTRAVENOUS; SUBCUTANEOUS DAILY
Qty: 0 | Refills: 0 | Status: DISCONTINUED | OUTPATIENT
Start: 2017-12-27 | End: 2018-01-30

## 2017-12-27 RX ORDER — ONDANSETRON 8 MG/1
4 TABLET, FILM COATED ORAL EVERY 6 HOURS
Qty: 0 | Refills: 0 | Status: DISCONTINUED | OUTPATIENT
Start: 2017-12-27 | End: 2018-01-30

## 2017-12-27 RX ORDER — ENOXAPARIN SODIUM 100 MG/ML
40 INJECTION SUBCUTANEOUS
Qty: 0 | Refills: 0 | Status: DISCONTINUED | OUTPATIENT
Start: 2017-12-27 | End: 2018-01-30

## 2017-12-27 RX ORDER — I.V. FAT EMULSION 20 G/100ML
0.5 EMULSION INTRAVENOUS
Qty: 50 | Refills: 0 | Status: DISCONTINUED | OUTPATIENT
Start: 2017-12-27 | End: 2017-12-27

## 2017-12-27 RX ORDER — OXYBUTYNIN CHLORIDE 5 MG
10 TABLET ORAL
Qty: 0 | Refills: 0 | Status: DISCONTINUED | OUTPATIENT
Start: 2017-12-27 | End: 2018-01-30

## 2017-12-27 RX ORDER — PREGABALIN 225 MG/1
1000 CAPSULE ORAL
Qty: 0 | Refills: 0 | Status: DISCONTINUED | OUTPATIENT
Start: 2017-12-27 | End: 2018-01-30

## 2017-12-27 RX ORDER — LOSARTAN POTASSIUM 100 MG/1
50 TABLET, FILM COATED ORAL DAILY
Qty: 0 | Refills: 0 | Status: DISCONTINUED | OUTPATIENT
Start: 2017-12-27 | End: 2018-01-30

## 2017-12-27 RX ORDER — PANTOPRAZOLE SODIUM 20 MG/1
40 TABLET, DELAYED RELEASE ORAL DAILY
Qty: 0 | Refills: 0 | Status: DISCONTINUED | OUTPATIENT
Start: 2017-12-27 | End: 2018-01-30

## 2017-12-27 RX ORDER — ELECTROLYTE SOLUTION,INJ
1 VIAL (ML) INTRAVENOUS
Qty: 0 | Refills: 0 | Status: DISCONTINUED | OUTPATIENT
Start: 2017-12-27 | End: 2017-12-28

## 2017-12-27 RX ORDER — ESCITALOPRAM OXALATE 10 MG/1
20 TABLET, FILM COATED ORAL DAILY
Qty: 0 | Refills: 0 | Status: DISCONTINUED | OUTPATIENT
Start: 2017-12-27 | End: 2018-01-02

## 2017-12-27 RX ORDER — COLLAGENASE CLOSTRIDIUM HIST. 250 UNIT/G
1 OINTMENT (GRAM) TOPICAL DAILY
Qty: 0 | Refills: 0 | Status: DISCONTINUED | OUTPATIENT
Start: 2017-12-27 | End: 2018-01-30

## 2017-12-27 RX ORDER — GABAPENTIN 400 MG/1
100 CAPSULE ORAL
Qty: 0 | Refills: 0 | Status: DISCONTINUED | OUTPATIENT
Start: 2017-12-27 | End: 2018-01-30

## 2017-12-27 RX ORDER — LIDOCAINE 4 G/100G
1 CREAM TOPICAL DAILY
Qty: 0 | Refills: 0 | Status: DISCONTINUED | OUTPATIENT
Start: 2017-12-27 | End: 2018-01-30

## 2017-12-27 RX ORDER — CALCITRIOL 0.5 UG/1
1 CAPSULE ORAL
Qty: 0 | Refills: 0 | Status: DISCONTINUED | OUTPATIENT
Start: 2017-12-27 | End: 2018-01-30

## 2017-12-27 RX ADMIN — Medication 1 EACH: at 17:38

## 2017-12-27 RX ADMIN — Medication 50 MILLIGRAM(S): at 11:38

## 2017-12-27 RX ADMIN — Medication 5 MILLIGRAM(S): at 21:08

## 2017-12-27 RX ADMIN — Medication 10 MILLIGRAM(S): at 05:02

## 2017-12-27 RX ADMIN — ESCITALOPRAM OXALATE 20 MILLIGRAM(S): 10 TABLET, FILM COATED ORAL at 21:08

## 2017-12-27 RX ADMIN — Medication 325 MILLIGRAM(S): at 11:39

## 2017-12-27 RX ADMIN — HYDROMORPHONE HYDROCHLORIDE 0.5 MILLIGRAM(S): 2 INJECTION INTRAMUSCULAR; INTRAVENOUS; SUBCUTANEOUS at 15:41

## 2017-12-27 RX ADMIN — I.V. FAT EMULSION 20.85 GM/KG/DAY: 20 EMULSION INTRAVENOUS at 21:08

## 2017-12-27 RX ADMIN — ENOXAPARIN SODIUM 40 MILLIGRAM(S): 100 INJECTION SUBCUTANEOUS at 05:03

## 2017-12-27 RX ADMIN — Medication 50 MILLIGRAM(S): at 05:02

## 2017-12-27 RX ADMIN — Medication 325 MILLIGRAM(S): at 18:08

## 2017-12-27 RX ADMIN — HYDROMORPHONE HYDROCHLORIDE 0.5 MILLIGRAM(S): 2 INJECTION INTRAMUSCULAR; INTRAVENOUS; SUBCUTANEOUS at 15:56

## 2017-12-27 RX ADMIN — NYSTATIN CREAM 1 APPLICATION(S): 100000 CREAM TOPICAL at 17:39

## 2017-12-27 RX ADMIN — GABAPENTIN 100 MILLIGRAM(S): 400 CAPSULE ORAL at 05:01

## 2017-12-27 RX ADMIN — NYSTATIN CREAM 1 APPLICATION(S): 100000 CREAM TOPICAL at 05:02

## 2017-12-27 RX ADMIN — Medication 1 APPLICATION(S): at 17:39

## 2017-12-27 RX ADMIN — GABAPENTIN 100 MILLIGRAM(S): 400 CAPSULE ORAL at 17:39

## 2017-12-27 RX ADMIN — ENOXAPARIN SODIUM 40 MILLIGRAM(S): 100 INJECTION SUBCUTANEOUS at 17:39

## 2017-12-27 RX ADMIN — OCTREOTIDE ACETATE 450 MICROGRAM(S): 200 INJECTION, SOLUTION INTRAVENOUS; SUBCUTANEOUS at 11:38

## 2017-12-27 RX ADMIN — PANTOPRAZOLE SODIUM 40 MILLIGRAM(S): 20 TABLET, DELAYED RELEASE ORAL at 05:02

## 2017-12-27 RX ADMIN — Medication 100 UNIT(S): at 11:38

## 2017-12-27 RX ADMIN — Medication 325 MILLIGRAM(S): at 12:09

## 2017-12-27 RX ADMIN — Medication 50 MILLIGRAM(S): at 21:08

## 2017-12-27 RX ADMIN — PREGABALIN 1000 MICROGRAM(S): 225 CAPSULE ORAL at 11:38

## 2017-12-27 RX ADMIN — Medication 50 MILLIGRAM(S): at 17:39

## 2017-12-27 RX ADMIN — LOSARTAN POTASSIUM 50 MILLIGRAM(S): 100 TABLET, FILM COATED ORAL at 05:02

## 2017-12-27 RX ADMIN — CALCITRIOL 1 MICROGRAM(S): 0.5 CAPSULE ORAL at 11:39

## 2017-12-27 RX ADMIN — Medication 1 APPLICATION(S): at 05:02

## 2017-12-27 RX ADMIN — Medication 325 MILLIGRAM(S): at 17:38

## 2017-12-27 RX ADMIN — Medication 10 MILLIGRAM(S): at 17:38

## 2017-12-27 NOTE — PROGRESS NOTE BEHAVIORAL HEALTH - SUMMARY
57F reported history of anxiety on Lexapro, no prior psych history, PMH gastric bypass surgery with multiple complications, currently admitted for extended period of time due to fistulas, reported SI while frustrated on 10/5 evening, has since consistently denied.  Psychiatry and psychology following 2-3 times weekly for support.  Patient somewhat regressed in hospital and with very mild cognitive impairments contributing to intermittent acting out behaviors.

## 2017-12-27 NOTE — PROGRESS NOTE BEHAVIORAL HEALTH - NSBHFUPINTERVALHXFT_PSY_A_CORE
Patient seen at bedside.  Reports surgery is scheduled for January; wants to be able to go home for daughter's baby shower in March.  Denies depressed mood, reports good sleep, feels pain is well managed.  Asks for me to obtain "sucking candies" for her- says her  won't because she lost her phone which showed a text from Dr. Brady OK'ing candy.  Asks me for an Italian ice- per RN patient allowed one per day and already had one.  Per primary team and RN, patient continues to request food from people she believes don't know she is not allowed to eat.

## 2017-12-27 NOTE — PROGRESS NOTE ADULT - SUBJECTIVE AND OBJECTIVE BOX
O/N: BRAYDEN, VSS  12/26 BRAYDEN, VSS    STATUS POST:  7/24: SBR resection, fistula resection, revision of esophagogegunostomy drain through esophageal hiatus in R mediastinum  7/29: Ex-lap, SB anastamosis in BP limb breakdown with succus throughout abdomen  8/1: abd washout, drainage of abscess, biologic mesh placement, closure of abdominal wall, vac and retention suture  8/7: abd washout, mesh removal, frozen abd noted, LLQ CHECO replaced with benson drain  8/10: leak noted from previous anastamosis site on L side, mid abdomen malecot drain placed in enterotomy, JPs x 2 placed, necrotic fascia debrided, vicryl mesh sutured to fascia circumferentially, xeroform over mesh then vac dressing placed O/N: BRAYDEN, ARMANDO  12/26 ARMANDO OWEN      SUBJECTIVE: Patient seen and examined bedside by surgery team. Pain well-controlled. Denies nausea, vomiting, chest pain, shortness of breath, fevers, or chills. No events overnight. Abdominal dressing leaking. Needs changing today.        enoxaparin Injectable 40 milliGRAM(s) SubCutaneous two times a day  heparin  flush 100 Units/mL Injectable 100 Unit(s) IV Push every other day  heparin  flush 100 Units/mL Injectable 100 Unit(s) IV Push every other day  losartan 50 milliGRAM(s) Oral daily      Vital Signs Last 24 Hrs  T(C): 36.1 (27 Dec 2017 04:49), Max: 37.1 (26 Dec 2017 20:30)  T(F): 97 (27 Dec 2017 04:49), Max: 98.8 (26 Dec 2017 20:30)  HR: 65 (27 Dec 2017 04:49) (62 - 76)  BP: 131/82 (27 Dec 2017 04:49) (118/75 - 154/86)  BP(mean): --  RR: 16 (27 Dec 2017 04:49) (16 - 17)  SpO2: 94% (27 Dec 2017 04:49) (94% - 97%)  I&O's Detail    25 Dec 2017 07:01  -  26 Dec 2017 07:00  --------------------------------------------------------  IN:    Fat Emulsion 20%: 249.6 mL    TPN (Total Parenteral Nutrition): 1752 mL  Total IN: 2001.6 mL    OUT:    Drain: 50 mL    Drain: 50 mL  Total OUT: 100 mL    Total NET: 1901.6 mL      26 Dec 2017 07:01  -  27 Dec 2017 06:28  --------------------------------------------------------  IN:    TPN (Total Parenteral Nutrition): 1752 mL  Total IN: 1752 mL    OUT:  Total OUT: 0 mL    Total NET: 1752 mL          Physical Exam  General: NAD, resting comfortably in bed  Pulm: Nonlabored breathing, no respiratory distress  Abd: soft, NT/ND. Wound pink and granulation tissue present; abdominal lesion 10 cm x 8 cm; dressing in place with good seal attached to low intermittent wall suction; leaking around periphery of adhesive; needs changing today  Extrem: WWP, no edema, SCDs in place      LABS:                        10.3   4.4   )-----------( 208      ( 26 Dec 2017 06:51 )             34.1     12-26    141  |  104  |  33<H>  ----------------------------<  118<H>  3.8   |  28  |  0.58    Ca    9.5      26 Dec 2017 06:51  Phos  3.2     12-26  Mg     2.1     12-26    TPro  6.4  /  Alb  2.7<L>  /  TBili  0.5  /  DBili  x   /  AST  19  /  ALT  25  /  AlkPhos  205<H>  12-25          RADIOLOGY & ADDITIONAL STUDIES:

## 2017-12-27 NOTE — PROGRESS NOTE ADULT - ASSESSMENT
57 F s/p  SBR, fistula resection, esophagojejunostomy revision w/ post-op course complicated by RTOR for distal anastomosis breakdown, ATN, acute respiratory failure, & septic shock, MRSA bacteremia which resolved. Currently for wound care management.     NPO (sips and chips)/TPN   Pain/nausea control - only benadryl at midnight and dilaudid 0.25mg during PT session   IS/OOB with PT daily  ISS  Vanco restarted (12/1 - stop date: 12/25), Nystatin powder  Benadryl for abdominal pruritus  SCDs, lovenox (therapeutic), OOBA  Lines :  L left subclavian line (10/4-12/2), L IJ (12/2--)  AM labs, weekly albumin, pre-albumin and triglyceride (every friday).  Wet to dry dressing in place  Colostomy w/ suction 57 F s/p  SBR, fistula resection, esophagojejunostomy revision w/ post-op course complicated by RTOR for distal anastomosis breakdown, ATN, acute respiratory failure, & septic shock, MRSA bacteremia which resolved. Currently for wound care management.     NPO (sips and chips)/TPN   Pain/nausea control - only benadryl at midnight and dilaudid 0.25mg during PT session   IS/OOB with PT daily  ISS  ABX course complete: (Vanco (12/1 - 12/25), Nystatin powder  Benadryl for abdominal pruritus  SCDs, lovenox (therapeutic), OOBA  Lines :  L left subclavian line (10/4-12/2), L IJ (12/2--)  AM labs, weekly albumin, pre-albumin and triglyceride (every friday).  Wet to dry dressing in place  Colostomy w/ suction

## 2017-12-28 LAB
ANION GAP SERPL CALC-SCNC: 11 MMOL/L — SIGNIFICANT CHANGE UP (ref 5–17)
BUN SERPL-MCNC: 33 MG/DL — HIGH (ref 7–23)
CALCIUM SERPL-MCNC: 9.6 MG/DL — SIGNIFICANT CHANGE UP (ref 8.4–10.5)
CHLORIDE SERPL-SCNC: 105 MMOL/L — SIGNIFICANT CHANGE UP (ref 96–108)
CO2 SERPL-SCNC: 28 MMOL/L — SIGNIFICANT CHANGE UP (ref 22–31)
CREAT SERPL-MCNC: 0.54 MG/DL — SIGNIFICANT CHANGE UP (ref 0.5–1.3)
GLUCOSE BLDC GLUCOMTR-MCNC: 107 MG/DL — HIGH (ref 70–99)
GLUCOSE BLDC GLUCOMTR-MCNC: 108 MG/DL — HIGH (ref 70–99)
GLUCOSE BLDC GLUCOMTR-MCNC: 95 MG/DL — SIGNIFICANT CHANGE UP (ref 70–99)
GLUCOSE SERPL-MCNC: 121 MG/DL — HIGH (ref 70–99)
MAGNESIUM SERPL-MCNC: 2.1 MG/DL — SIGNIFICANT CHANGE UP (ref 1.6–2.6)
PHOSPHATE SERPL-MCNC: 3 MG/DL — SIGNIFICANT CHANGE UP (ref 2.5–4.5)
POTASSIUM SERPL-MCNC: 3.6 MMOL/L — SIGNIFICANT CHANGE UP (ref 3.5–5.3)
POTASSIUM SERPL-SCNC: 3.6 MMOL/L — SIGNIFICANT CHANGE UP (ref 3.5–5.3)
SODIUM SERPL-SCNC: 144 MMOL/L — SIGNIFICANT CHANGE UP (ref 135–145)

## 2017-12-28 RX ORDER — ELECTROLYTE SOLUTION,INJ
1 VIAL (ML) INTRAVENOUS
Qty: 0 | Refills: 0 | Status: DISCONTINUED | OUTPATIENT
Start: 2017-12-28 | End: 2017-12-28

## 2017-12-28 RX ADMIN — NYSTATIN CREAM 1 APPLICATION(S): 100000 CREAM TOPICAL at 05:13

## 2017-12-28 RX ADMIN — ESCITALOPRAM OXALATE 20 MILLIGRAM(S): 10 TABLET, FILM COATED ORAL at 21:44

## 2017-12-28 RX ADMIN — Medication 50 MILLIGRAM(S): at 17:39

## 2017-12-28 RX ADMIN — GABAPENTIN 100 MILLIGRAM(S): 400 CAPSULE ORAL at 05:14

## 2017-12-28 RX ADMIN — Medication 10 MILLIGRAM(S): at 05:13

## 2017-12-28 RX ADMIN — OCTREOTIDE ACETATE 450 MICROGRAM(S): 200 INJECTION, SOLUTION INTRAVENOUS; SUBCUTANEOUS at 11:31

## 2017-12-28 RX ADMIN — Medication 5 MILLIGRAM(S): at 21:44

## 2017-12-28 RX ADMIN — Medication 325 MILLIGRAM(S): at 21:44

## 2017-12-28 RX ADMIN — NYSTATIN CREAM 1 APPLICATION(S): 100000 CREAM TOPICAL at 17:41

## 2017-12-28 RX ADMIN — Medication 325 MILLIGRAM(S): at 18:40

## 2017-12-28 RX ADMIN — ENOXAPARIN SODIUM 40 MILLIGRAM(S): 100 INJECTION SUBCUTANEOUS at 05:14

## 2017-12-28 RX ADMIN — Medication 50 MILLIGRAM(S): at 11:32

## 2017-12-28 RX ADMIN — LOSARTAN POTASSIUM 50 MILLIGRAM(S): 100 TABLET, FILM COATED ORAL at 05:14

## 2017-12-28 RX ADMIN — GABAPENTIN 100 MILLIGRAM(S): 400 CAPSULE ORAL at 17:39

## 2017-12-28 RX ADMIN — Medication 325 MILLIGRAM(S): at 17:40

## 2017-12-28 RX ADMIN — PANTOPRAZOLE SODIUM 40 MILLIGRAM(S): 20 TABLET, DELAYED RELEASE ORAL at 05:14

## 2017-12-28 RX ADMIN — Medication 1 APPLICATION(S): at 11:33

## 2017-12-28 RX ADMIN — Medication 50 MILLIGRAM(S): at 21:44

## 2017-12-28 RX ADMIN — PREGABALIN 1000 MICROGRAM(S): 225 CAPSULE ORAL at 11:32

## 2017-12-28 RX ADMIN — Medication 325 MILLIGRAM(S): at 22:25

## 2017-12-28 RX ADMIN — ONDANSETRON 4 MILLIGRAM(S): 8 TABLET, FILM COATED ORAL at 17:40

## 2017-12-28 RX ADMIN — Medication 10 MILLIGRAM(S): at 17:39

## 2017-12-28 RX ADMIN — Medication 1 EACH: at 17:39

## 2017-12-28 RX ADMIN — Medication 50 MILLIGRAM(S): at 05:14

## 2017-12-28 RX ADMIN — ENOXAPARIN SODIUM 40 MILLIGRAM(S): 100 INJECTION SUBCUTANEOUS at 17:40

## 2017-12-28 NOTE — CHART NOTE - NSCHARTNOTEFT_GEN_A_CORE
Admitting Diagnosis:   Patient is a 57y old  Female who presents with a chief complaint of enterocutaneous fistula (24 Jul 2017 14:15)      PAST MEDICAL & SURGICAL HISTORY:  Reflux  Obesity  DVT (deep venous thrombosis): 2013 neck  Diverticulitis  Hypertension  Elective surgery: abodominal wall surgery  dec 2016  Elective surgery: NOV 2016 gastric bypass revision  Gastric bypass status for obesity: gastric sleeve, 6/2012  S/P breast biopsy: 2011, left  S/P colon resection: 2011  S/P knee surgery: repair 2009, 2011  right; left  Umbilical hernia: 2000  S/P appendectomy: 1975  S/P tonsillectomy: 1967    Current Nutrition Order:  NPO except ice chips and water  >> TPN via central venous line:  1752 TV (73ml/hr); 355g D, 140g AA, 50g 20% lipids (2267kcal calories): (2267 kcal, 2.7g/kg IBW pro, GIR 3.16 based on most recent standing wt)    PO Intake: Good (%) [   ]  Fair (50-75%) [   ] Poor (<25%) [   ] N/A   Per PA note in Patient Care orders- pt can have 1 italian icee a day. pt is requesting 2 per day. Discussed w/ patient purpose of having limited PO intake.    GI Issues: No reported GI distress at this time.    Pain: No C/o pain.    Skin Integrity: sacrum un-stageable PU; sacral abrasion; abd wound--> NGtube placed w/ sponge through pouch into fistula for suction  Pt reports vac is leaking more than usual today. RN and Team aware.      Labs:   12-28    144  |  105  |  33<H>  ----------------------------<  121<H>  3.6   |  28  |  0.54    Ca    9.6      28 Dec 2017 06:14  Phos  3.0     12-28  Mg     2.1     12-28    TPro  6.5  /  Alb  2.9<L>  /  TBili  0.4  /  DBili  x   /  AST  24  /  ALT  29  /  AlkPhos  221<H>  12-27    CAPILLARY BLOOD GLUCOSE      POCT Blood Glucose.: 107 mg/dL (28 Dec 2017 12:11)  POCT Blood Glucose.: 108 mg/dL (28 Dec 2017 06:26)  POCT Blood Glucose.: 129 mg/dL (27 Dec 2017 23:51)  POCT Blood Glucose.: 108 mg/dL (27 Dec 2017 18:59)      Medications:  MEDICATIONS  (STANDING):  bismuth subsalicylate Liquid 30 milliLiter(s) Oral daily  calcitriol Injectable 1 MICROGram(s) IV Push <User Schedule>  collagenase Ointment 1 Application(s) Topical daily  cyanocobalamin Injectable 1000 MICROGram(s) SubCutaneous every 7 days  dextrose 5%. 1000 milliLiter(s) (50 mL/Hr) IV Continuous <Continuous>  dextrose 50% Injectable 25 Gram(s) IV Push once  dextrose 50% Injectable 12.5 Gram(s) IV Push once  diazepam    Tablet 5 milliGRAM(s) Oral at bedtime  diphenhydrAMINE   Capsule 50 milliGRAM(s) Oral every 6 hours  diphenhydrAMINE   Injectable 50 milliGRAM(s) IV Push at bedtime  enoxaparin Injectable 40 milliGRAM(s) SubCutaneous two times a day  escitalopram 20 milliGRAM(s) Oral daily  gabapentin 100 milliGRAM(s) Oral two times a day  heparin  flush 100 Units/mL Injectable 100 Unit(s) IV Push every other day  heparin  flush 100 Units/mL Injectable 100 Unit(s) IV Push every other day  insulin lispro (HumaLOG) corrective regimen sliding scale   SubCutaneous every 6 hours  lidocaine   Patch 1 Patch Transdermal daily  losartan 50 milliGRAM(s) Oral daily  nystatin Powder 1 Application(s) Topical two times a day  octreotide  Injectable 450 MICROGram(s) IV Push daily  oxybutynin 10 milliGRAM(s) Oral two times a day  pantoprazole  Injectable 40 milliGRAM(s) IV Push daily  Parenteral Nutrition - Adult 1 Each (73 mL/Hr) TPN Continuous <Continuous>  sodium chloride 0.9% lock flush 20 milliLiter(s) IV Push once    MEDICATIONS  (PRN):  acetaminophen   Tablet. 325 milliGRAM(s) Oral every 4 hours PRN Moderate Pain (4 - 6)  ondansetron Injectable 4 milliGRAM(s) IV Push every 6 hours PRN Nausea and/or Vomiting  sodium chloride 0.9% lock flush 10 milliLiter(s) IV Push every 1 hour PRN After each medication administration  sodium chloride 0.9% lock flush 10 milliLiter(s) IV Push every 12 hours PRN Lumen of catheter NOT used      Weight:  172.5 (12/19)  171.5lb (12/14)  167lb (11/13)  164lb (10/17)  219lb (8/1)    Weight Change:   03/2016: 89kg  02/2017: 74kg   07/2017: 76kg  11/2017: 76kg   Pt's most recent wt is 172.5lbs per standing scale --indicating a 5.5lb wt gain since last month. Pt stated she would get on scale during PT- discussed plan w/ RN.    Estimated energy needs:   Estimated energy needs using 52 kg IBW:  increased needs per wound and pressure ulcer healing. needs calculated based on TPN as primary route of nutrition.  30-35 kcal/kg (4445-3774 kcal).   1.8-2 g/kg ( g protein).  30-35 mL/kg (8907-4823 mL fluid).     Subjective:   Nutrition course remains the same. Pt is NPO w/ ice chips and sips of water. Per PA note in Patient Care orders- pt can have 1 italian icee a day. pt is requesting 2 per day. Discussed w/ patient purpose of having limited PO intake.  No apparent GI distress. Will continue to follow wts to assess adjusting nutrient needs.  TPN bag infusing at 73ml/hr providing 355g Dex, 140g AA, 50g 20% lipids (2267 kcal, 2.7g pro/kg IBW pro, GIR 3.16-based most recent standing wt).   Please update weights weekly to trend adequacy of nutrient intake via TPN.     Previous Nutrition Diagnosis: Increased nutrient needs RT increased demand for nutrients AEB wound and pressure ulcer healing   Active [  X]  Resolved [   ]    If resolved, new PES: N/A    Goal: Pt to meet >75% EER via tolerated route    Recommendations:  1) Continue w/ current TPN order as tolerated and assess feasibility of other routes w/wound healing  2) Monitor triglycerides & adjust lipids per MD discretion -Please obtain new lab values.  3) Please continue to take standing weight for trending purposes  4) Consider OsCal tablet crushed w/ water/italian ice     Education: Understands meeting nutrient needs via TPN.    Risk Level: High [  X ] Moderate [   ] Low [   ].

## 2017-12-28 NOTE — PROGRESS NOTE ADULT - SUBJECTIVE AND OBJECTIVE BOX
O/N: BRAYDEN. VSS.  12/27: wound vac changed; abdominal culture collected for epidemiology O/N: BRAYDEN. VSS.  12/27: wound vac changed; abdominal culture collected for epidemiology      SUBJECTIVE: Patient seen and examined bedside by surgery team. Pain well-controlled. Denies nausea, vomiting, chest pain, shortness of breath, fevers, or chills. Wound vac intact. No need for change today.        enoxaparin Injectable 40 milliGRAM(s) SubCutaneous two times a day  heparin  flush 100 Units/mL Injectable 100 Unit(s) IV Push every other day  heparin  flush 100 Units/mL Injectable 100 Unit(s) IV Push every other day  losartan 50 milliGRAM(s) Oral daily      Vital Signs Last 24 Hrs  T(C): 36.2 (28 Dec 2017 05:09), Max: 36.9 (27 Dec 2017 17:05)  T(F): 97.1 (28 Dec 2017 05:09), Max: 98.5 (27 Dec 2017 17:05)  HR: 65 (28 Dec 2017 05:09) (62 - 70)  BP: 147/93 (28 Dec 2017 05:09) (130/83 - 155/82)  BP(mean): --  RR: 18 (28 Dec 2017 05:09) (16 - 18)  SpO2: 95% (28 Dec 2017 05:09) (94% - 95%)  I&O's Detail    26 Dec 2017 07:01  -  27 Dec 2017 07:00  --------------------------------------------------------  IN:    TPN (Total Parenteral Nutrition): 1752 mL  Total IN: 1752 mL    OUT:  Total OUT: 0 mL    Total NET: 1752 mL      27 Dec 2017 07:01  -  28 Dec 2017 06:53  --------------------------------------------------------  IN:    fat emulsion (Plant Based) 20% Infusion: 200 mL    Fat Emulsion 20%: 803 mL    Oral Fluid: 120 mL    TPN (Total Parenteral Nutrition): 876 mL  Total IN: 1999 mL    OUT:    Drain: 25 mL    Voided: 200 mL  Total OUT: 225 mL    Total NET: 1774 mL            Physical Exam  General: NAD, resting comfortably in bed  Pulm: Nonlabored breathing, no respiratory distress  Abd: soft, NT/ND. Wound pink and granulation tissue present; abdominal lesion 10 cm x 8 cm; dressing in place with good seal attached to low intermittent wall suction; leaking around periphery of adhesive; needs changing today  Extrem: WWP, no edema, SCDs in place        LABS:                        9.9    4.0   )-----------( 200      ( 27 Dec 2017 07:44 )             32.6     12-28    144  |  105  |  33<H>  ----------------------------<  121<H>  3.6   |  28  |  0.54    Ca    9.6      28 Dec 2017 06:14  Phos  3.0     12-28  Mg     2.1     12-28    TPro  6.5  /  Alb  2.9<L>  /  TBili  0.4  /  DBili  x   /  AST  24  /  ALT  29  /  AlkPhos  221<H>  12-27          RADIOLOGY & ADDITIONAL STUDIES:

## 2017-12-28 NOTE — PROGRESS NOTE ADULT - ASSESSMENT
57 F s/p  SBR, fistula resection, esophagojejunostomy revision w/ post-op course complicated by RTOR for distal anastomosis breakdown, ATN, acute respiratory failure, & septic shock, MRSA bacteremia which resolved. Currently for wound care management.     NPO (sips and chips)/TPN   Pain/nausea control - only benadryl at midnight and dilaudid 0.25mg during PT session   IS/OOB with PT daily  ISS  ID abx course complete: (Vanco 12/1 - 12/25), Nystatin powder  Benadryl for abdominal pruritus  SCDs, lovenox (therapeutic), OOBA  Lines :  L left subclavian line (10/4-12/2), L IJ (12/2--)  AM labs, weekly albumin, pre-albumin and triglyceride (every friday).  Wet to dry dressing in place

## 2017-12-29 LAB
-  AMPICILLIN: SIGNIFICANT CHANGE UP
-  VANCOMYCIN: SIGNIFICANT CHANGE UP
ANION GAP SERPL CALC-SCNC: 12 MMOL/L — SIGNIFICANT CHANGE UP (ref 5–17)
BUN SERPL-MCNC: 37 MG/DL — HIGH (ref 7–23)
CALCIUM SERPL-MCNC: 9.7 MG/DL — SIGNIFICANT CHANGE UP (ref 8.4–10.5)
CHLORIDE SERPL-SCNC: 104 MMOL/L — SIGNIFICANT CHANGE UP (ref 96–108)
CO2 SERPL-SCNC: 26 MMOL/L — SIGNIFICANT CHANGE UP (ref 22–31)
CREAT SERPL-MCNC: 0.63 MG/DL — SIGNIFICANT CHANGE UP (ref 0.5–1.3)
GLUCOSE BLDC GLUCOMTR-MCNC: 103 MG/DL — HIGH (ref 70–99)
GLUCOSE BLDC GLUCOMTR-MCNC: 110 MG/DL — HIGH (ref 70–99)
GLUCOSE BLDC GLUCOMTR-MCNC: 111 MG/DL — HIGH (ref 70–99)
GLUCOSE BLDC GLUCOMTR-MCNC: 97 MG/DL — SIGNIFICANT CHANGE UP (ref 70–99)
GLUCOSE SERPL-MCNC: 96 MG/DL — SIGNIFICANT CHANGE UP (ref 70–99)
MAGNESIUM SERPL-MCNC: 2.2 MG/DL — SIGNIFICANT CHANGE UP (ref 1.6–2.6)
METHOD TYPE: SIGNIFICANT CHANGE UP
PHOSPHATE SERPL-MCNC: 3.4 MG/DL — SIGNIFICANT CHANGE UP (ref 2.5–4.5)
POTASSIUM SERPL-MCNC: 4.3 MMOL/L — SIGNIFICANT CHANGE UP (ref 3.5–5.3)
POTASSIUM SERPL-SCNC: 4.3 MMOL/L — SIGNIFICANT CHANGE UP (ref 3.5–5.3)
SODIUM SERPL-SCNC: 142 MMOL/L — SIGNIFICANT CHANGE UP (ref 135–145)
TRIGL SERPL-MCNC: 92 MG/DL — SIGNIFICANT CHANGE UP (ref 10–149)

## 2017-12-29 PROCEDURE — 99231 SBSQ HOSP IP/OBS SF/LOW 25: CPT

## 2017-12-29 RX ORDER — DIPHENHYDRAMINE HCL 50 MG
50 CAPSULE ORAL EVERY 6 HOURS
Qty: 0 | Refills: 0 | Status: DISCONTINUED | OUTPATIENT
Start: 2017-12-29 | End: 2018-01-30

## 2017-12-29 RX ORDER — ELECTROLYTE SOLUTION,INJ
1 VIAL (ML) INTRAVENOUS
Qty: 0 | Refills: 0 | Status: DISCONTINUED | OUTPATIENT
Start: 2017-12-29 | End: 2017-12-29

## 2017-12-29 RX ORDER — I.V. FAT EMULSION 20 G/100ML
0.5 EMULSION INTRAVENOUS
Qty: 49.95 | Refills: 0 | Status: DISCONTINUED | OUTPATIENT
Start: 2017-12-29 | End: 2017-12-29

## 2017-12-29 RX ADMIN — OCTREOTIDE ACETATE 450 MICROGRAM(S): 200 INJECTION, SOLUTION INTRAVENOUS; SUBCUTANEOUS at 11:46

## 2017-12-29 RX ADMIN — Medication 325 MILLIGRAM(S): at 11:44

## 2017-12-29 RX ADMIN — Medication 1 APPLICATION(S): at 17:25

## 2017-12-29 RX ADMIN — Medication 325 MILLIGRAM(S): at 13:17

## 2017-12-29 RX ADMIN — Medication 100 UNIT(S): at 11:45

## 2017-12-29 RX ADMIN — NYSTATIN CREAM 1 APPLICATION(S): 100000 CREAM TOPICAL at 05:36

## 2017-12-29 RX ADMIN — GABAPENTIN 100 MILLIGRAM(S): 400 CAPSULE ORAL at 05:35

## 2017-12-29 RX ADMIN — Medication 10 MILLIGRAM(S): at 05:35

## 2017-12-29 RX ADMIN — Medication 325 MILLIGRAM(S): at 21:39

## 2017-12-29 RX ADMIN — Medication 100 UNIT(S): at 11:44

## 2017-12-29 RX ADMIN — LOSARTAN POTASSIUM 50 MILLIGRAM(S): 100 TABLET, FILM COATED ORAL at 05:35

## 2017-12-29 RX ADMIN — Medication 5 MILLIGRAM(S): at 21:39

## 2017-12-29 RX ADMIN — Medication 325 MILLIGRAM(S): at 06:10

## 2017-12-29 RX ADMIN — NYSTATIN CREAM 1 APPLICATION(S): 100000 CREAM TOPICAL at 17:27

## 2017-12-29 RX ADMIN — ENOXAPARIN SODIUM 40 MILLIGRAM(S): 100 INJECTION SUBCUTANEOUS at 17:24

## 2017-12-29 RX ADMIN — Medication 325 MILLIGRAM(S): at 05:35

## 2017-12-29 RX ADMIN — Medication 10 MILLIGRAM(S): at 17:25

## 2017-12-29 RX ADMIN — GABAPENTIN 100 MILLIGRAM(S): 400 CAPSULE ORAL at 17:24

## 2017-12-29 RX ADMIN — Medication 50 MILLIGRAM(S): at 17:24

## 2017-12-29 RX ADMIN — ESCITALOPRAM OXALATE 20 MILLIGRAM(S): 10 TABLET, FILM COATED ORAL at 21:39

## 2017-12-29 RX ADMIN — Medication 325 MILLIGRAM(S): at 22:35

## 2017-12-29 RX ADMIN — I.V. FAT EMULSION 20.81 GM/KG/DAY: 20 EMULSION INTRAVENOUS at 21:39

## 2017-12-29 RX ADMIN — Medication 1 EACH: at 17:21

## 2017-12-29 RX ADMIN — PANTOPRAZOLE SODIUM 40 MILLIGRAM(S): 20 TABLET, DELAYED RELEASE ORAL at 05:35

## 2017-12-29 RX ADMIN — Medication 50 MILLIGRAM(S): at 11:45

## 2017-12-29 RX ADMIN — CALCITRIOL 1 MICROGRAM(S): 0.5 CAPSULE ORAL at 11:46

## 2017-12-29 RX ADMIN — Medication 50 MILLIGRAM(S): at 06:59

## 2017-12-29 RX ADMIN — ENOXAPARIN SODIUM 40 MILLIGRAM(S): 100 INJECTION SUBCUTANEOUS at 05:36

## 2017-12-29 NOTE — PROGRESS NOTE BEHAVIORAL HEALTH - THOUGHT CONTENT
Unremarkable

## 2017-12-29 NOTE — PROGRESS NOTE BEHAVIORAL HEALTH - NSBHPTASSESSDT_PSY_A_CORE
03-Nov-2017 11:00
06-Dec-2017 09:30
11-Oct-2017
14-Nov-2017 10:00
19-Oct-2017 10:15
21-Nov-2017 11:30
26-Oct-2017 14:00
09-Oct-2017 11:30
27-Dec-2017 11:00
29-Dec-2017 13:00
14-Nov-2017 10:00

## 2017-12-29 NOTE — PROGRESS NOTE BEHAVIORAL HEALTH - PRIMARY DX
Adjustment disorder with disturbance of conduct

## 2017-12-29 NOTE — PROGRESS NOTE BEHAVIORAL HEALTH - BEHAVIOR
Cooperative
Other
Cooperative
Uncooperative
Cooperative
Uncooperative
Cooperative

## 2017-12-29 NOTE — PROGRESS NOTE BEHAVIORAL HEALTH - NSBHCHARTREVIEWLAB_PSY_A_CORE FT
12-06    141  |  101  |  35<H>  ----------------------------<  98  4.1   |  30  |  0.59    Ca    9.7      06 Dec 2017 07:48  Phos  3.4     12-06  Mg     2.0     12-06    TPro  6.8  /  Alb  3.1<L>  /  TBili  0.6  /  DBili  x   /  AST  44<H>  /  ALT  72<H>  /  AlkPhos  242<H>  12-05                        10.2   4.5   )-----------( 229      ( 06 Dec 2017 07:48 )             33.5
10.5   6.0   )-----------( 278      ( 13 Nov 2017 07:32 )             33.9   11-13    140  |  99  |  37<H>  ----------------------------<  136<H>  4.0   |  26  |  0.58    Ca    9.8      13 Nov 2017 07:32  Phos  3.3     11-13  Mg     2.0     11-13
12-29    142  |  104  |  37<H>  ----------------------------<  96  4.3   |  26  |  0.63    Ca    9.7      29 Dec 2017 06:59  Phos  3.4     12-29  Mg     2.2     12-29
10.5   6.0   )-----------( 278      ( 13 Nov 2017 07:32 )             33.9   11-13    140  |  99  |  37<H>  ----------------------------<  136<H>  4.0   |  26  |  0.58    Ca    9.8      13 Nov 2017 07:32  Phos  3.3     11-13  Mg     2.0     11-13
9.9    4.0   )-----------( 200      ( 27 Dec 2017 07:44 )             32.6   12-28    144  |  105  |  33<H>  ----------------------------<  121<H>  3.6   |  28  |  0.54    Ca    9.6      28 Dec 2017 06:14  Phos  3.0     12-28  Mg     2.1     12-28    TPro  6.5  /  Alb  2.9<L>  /  TBili  0.4  /  DBili  x   /  AST  24  /  ALT  29  /  AlkPhos  221<H>  12-27

## 2017-12-29 NOTE — PROGRESS NOTE BEHAVIORAL HEALTH - NSBHATTESTSEENBY_PSY_A_CORE
attending Psychiatrist without NP/Trainee

## 2017-12-29 NOTE — PROGRESS NOTE BEHAVIORAL HEALTH - NSBHFUPTYPE_PSY_A_CORE
Consult Follow Up

## 2017-12-29 NOTE — PROGRESS NOTE BEHAVIORAL HEALTH - NSBHCHARTREVIEWVS_PSY_A_CORE FT
Vital Signs Last 24 Hrs  T(C): 37.2 (06 Dec 2017 14:43), Max: 37.4 (06 Dec 2017 05:57)  T(F): 99 (06 Dec 2017 14:43), Max: 99.4 (06 Dec 2017 05:57)  HR: 65 (06 Dec 2017 14:43) (63 - 70)  BP: 143/79 (06 Dec 2017 14:43) (129/77 - 143/79)  BP(mean): --  RR: 16 (06 Dec 2017 14:43) (16 - 17)  SpO2: 97% (06 Dec 2017 14:43) (94% - 97%)
Vital Signs Last 24 Hrs  T(C): 36.9 (14 Nov 2017 05:51), Max: 37.3 (13 Nov 2017 14:28)  T(F): 98.4 (14 Nov 2017 05:51), Max: 99.1 (13 Nov 2017 14:28)  HR: 72 (14 Nov 2017 05:51) (68 - 79)  BP: 129/84 (14 Nov 2017 05:51) (119/81 - 130/85)  BP(mean): --  RR: 17 (14 Nov 2017 05:51) (16 - 17)  SpO2: 94% (14 Nov 2017 05:51) (93% - 95%)
Vital Signs Last 24 Hrs  T(C): 37.1 (29 Dec 2017 14:05), Max: 37.1 (28 Dec 2017 20:45)  T(F): 98.7 (29 Dec 2017 14:05), Max: 98.7 (28 Dec 2017 20:45)  HR: 63 (29 Dec 2017 14:05) (58 - 70)  BP: 129/82 (29 Dec 2017 14:05) (129/82 - 148/91)  BP(mean): --  RR: 17 (29 Dec 2017 14:05) (16 - 17)  SpO2: 95% (29 Dec 2017 14:05) (94% - 97%)
Vital Signs Last 24 Hrs  T(C): 36.9 (14 Nov 2017 05:51), Max: 37.3 (13 Nov 2017 14:28)  T(F): 98.4 (14 Nov 2017 05:51), Max: 99.1 (13 Nov 2017 14:28)  HR: 72 (14 Nov 2017 05:51) (68 - 79)  BP: 129/84 (14 Nov 2017 05:51) (119/81 - 130/85)  BP(mean): --  RR: 17 (14 Nov 2017 05:51) (16 - 17)  SpO2: 94% (14 Nov 2017 05:51) (93% - 95%)
Vital Signs Last 24 Hrs  T(C): 36.3 (28 Dec 2017 09:11), Max: 36.9 (27 Dec 2017 17:05)  T(F): 97.4 (28 Dec 2017 09:11), Max: 98.5 (27 Dec 2017 17:05)  HR: 70 (28 Dec 2017 09:11) (62 - 70)  BP: 140/88 (28 Dec 2017 09:11) (130/83 - 148/87)  BP(mean): --  RR: 17 (28 Dec 2017 09:11) (17 - 18)  SpO2: 95% (28 Dec 2017 09:11) (94% - 95%)

## 2017-12-29 NOTE — PROGRESS NOTE BEHAVIORAL HEALTH - PERCEPTIONS
No abnormalities

## 2017-12-29 NOTE — PROGRESS NOTE BEHAVIORAL HEALTH - NSBHFUPINTERVALHXFT_PSY_A_CORE
Patient seen at bedside.  Noted to be awake, watching TV with lights on when I entered.  Reports she is doing well.  I discussed with patient that surgery team not recommending "sucking candies" at the moment; a few minutes later she again asked me to obtain some for her.  Able to state that she can't eat because PO intake comes through wound and impedes healing.  Says she wants soup for AJIT.

## 2017-12-29 NOTE — PROGRESS NOTE ADULT - ASSESSMENT
57 F s/p  SBR, fistula resection, esophagojejunostomy revision w/ post-op course complicated by RTOR for distal anastomosis breakdown, ATN, acute respiratory failure, & septic shock, MRSA bacteremia which resolved. Currently for wound care management.     NPO (sips and chips)/TPN   Pain/nausea control - only benadryl at midnight and dilaudid 0.25mg during PT session   IS/OOB with PT daily  ISS  ID abx course complete: (Vanco 12/1 - 12/25), Nystatin powder  Benadryl for abdominal pruritus  SCDs, lovenox (therapeutic), OOBA  Lines :  L left subclavian line (10/4-12/2), L IJ (12/2--)  AM labs, weekly albumin, pre-albumin and triglyceride (every friday).  Wet to dry dressing in place  Colostomy w/ suction

## 2017-12-29 NOTE — PROGRESS NOTE BEHAVIORAL HEALTH - NSBHCONSFOLLOWNEEDS_PSY_A_CORE
no psychiatric contraindications to discharge

## 2017-12-29 NOTE — PROGRESS NOTE ADULT - SUBJECTIVE AND OBJECTIVE BOX
O/N: VSS. BRAYDEN  12/28: wound vac changed, BRAYDEN O/N: VSS. BRAYDEN  12/28: wound vac changed, BRAYDEN      Vital Signs Last 24 Hrs  T(C): 36.6 (29 Dec 2017 06:04), Max: 37.1 (28 Dec 2017 20:45)  T(F): 97.8 (29 Dec 2017 06:04), Max: 98.7 (28 Dec 2017 20:45)  HR: 58 (29 Dec 2017 06:04) (58 - 70)  BP: 148/91 (29 Dec 2017 06:04) (134/89 - 148/91)  BP(mean): --  RR: 16 (29 Dec 2017 06:04) (16 - 17)  SpO2: 97% (29 Dec 2017 06:04) (94% - 97%)      I&O's Summary    27 Dec 2017 07:01  -  28 Dec 2017 07:00  --------------------------------------------------------  IN: 1999 mL / OUT: 225 mL / NET: 1774 mL    28 Dec 2017 07:01  -  29 Dec 2017 06:15  --------------------------------------------------------  IN: 1668.4 mL / OUT: 250 mL / NET: 1418.4 mL          Gen: NAD   Abd: Vac in place

## 2017-12-29 NOTE — PROGRESS NOTE BEHAVIORAL HEALTH - NSBHCONSULTMEDS_PSY_A_CORE FT
1. Continue Lexapro 20 mg PO daily  2. Continue Valium 5 mg PO HS  3. Will follow 2-3 times weekly for support
1. Continue Lexapro 20 mg PO daily  2. Continue Valium 5 mg PO HS
1. Continue Lexapro 20 mg PO daily  2. Continue Valium 5 mg PO HS  3. Consider pain management consult
1. Continue Lexapro 20 mg PO daily  2. Continue Valium 5 mg PO HS
1. Continue Lexapro 20 mg PO daily  2. Continue Valium 5 mg PO HS
1. Continue Lexapro 20 mg PO daily  2. Continue Valium 5 mg PO HS  3. Will follow 2-3 times weekly for support
1. Continue Lexapro 20 mg PO daily  2. Continue Valium 5 mg PO HS
1. Continue Lexapro 20 mg PO daily  2. Continue Valium 5 mg PO HS  3. Will follow 2-3 times weekly for support
1. Continue Lexapro 20 mg PO daily  2. Continue Valium 5 mg PO HS  3. Will follow 2-3 times weekly for support
1. Continue Lexapro 20 mg PO daily  2. Continue Valium 5 mg PO HS
1. Continue Lexapro 20 mg PO daily  2. Continue Valium 5 mg PO HS

## 2017-12-29 NOTE — PROGRESS NOTE BEHAVIORAL HEALTH - NSBHCONSORIP_PSY_A_CORE
Consult...

## 2017-12-29 NOTE — PROGRESS NOTE BEHAVIORAL HEALTH - AFFECT CONGRUENCE
Not congruent
Congruent
Not congruent
Congruent
Congruent
Not congruent
Congruent
Not congruent
Not congruent
Congruent

## 2017-12-29 NOTE — PROGRESS NOTE BEHAVIORAL HEALTH - NSBHADMITCOUNSEL_PSY_A_CORE
diagnostic results/impressions and/or recommended studies/prognosis/risks and benefits of treatment options/importance of adherence to chosen treatment/client/family/caregiver education/instructions for management, treatment and follow up/risk factor reduction
diagnostic results/impressions and/or recommended studies/risks and benefits of treatment options/importance of adherence to chosen treatment/prognosis/instructions for management, treatment and follow up/risk factor reduction/client/family/caregiver education
prognosis/diagnostic results/impressions and/or recommended studies/risks and benefits of treatment options/importance of adherence to chosen treatment/risk factor reduction/client/family/caregiver education/instructions for management, treatment and follow up
client/family/caregiver education/diagnostic results/impressions and/or recommended studies/risks and benefits of treatment options/importance of adherence to chosen treatment/instructions for management, treatment and follow up/risk factor reduction/prognosis
client/family/caregiver education/prognosis/instructions for management, treatment and follow up/risk factor reduction/diagnostic results/impressions and/or recommended studies/risks and benefits of treatment options/importance of adherence to chosen treatment
prognosis/diagnostic results/impressions and/or recommended studies/risks and benefits of treatment options/instructions for management, treatment and follow up/risk factor reduction/importance of adherence to chosen treatment/client/family/caregiver education
client/family/caregiver education/importance of adherence to chosen treatment/risk factor reduction/risks and benefits of treatment options/instructions for management, treatment and follow up/diagnostic results/impressions and/or recommended studies/prognosis
client/family/caregiver education/importance of adherence to chosen treatment/prognosis/diagnostic results/impressions and/or recommended studies/risks and benefits of treatment options/instructions for management, treatment and follow up/risk factor reduction

## 2017-12-29 NOTE — PROGRESS NOTE BEHAVIORAL HEALTH - NSBHFUPINTERVALCCFT_PSY_A_CORE
"I'm fine"
"Can you get my nurse"
"I brought in food" follow up for depression
"I'm doing OK"
"I'm fine"
"I'm resting" follow up for depression
"I've been doing PT"  follow up for depression
"I'm fine"
"I'm having surgery"
"
"I'm fine" follow up for depression

## 2017-12-30 LAB
ALBUMIN SERPL ELPH-MCNC: 3.3 G/DL — SIGNIFICANT CHANGE UP (ref 3.3–5)
ALP SERPL-CCNC: 239 U/L — HIGH (ref 40–120)
ALT FLD-CCNC: 40 U/L — SIGNIFICANT CHANGE UP (ref 10–45)
ANION GAP SERPL CALC-SCNC: 9 MMOL/L — SIGNIFICANT CHANGE UP (ref 5–17)
AST SERPL-CCNC: 29 U/L — SIGNIFICANT CHANGE UP (ref 10–40)
BILIRUB SERPL-MCNC: 0.5 MG/DL — SIGNIFICANT CHANGE UP (ref 0.2–1.2)
BUN SERPL-MCNC: 35 MG/DL — HIGH (ref 7–23)
CALCIUM SERPL-MCNC: 9.6 MG/DL — SIGNIFICANT CHANGE UP (ref 8.4–10.5)
CHLORIDE SERPL-SCNC: 105 MMOL/L — SIGNIFICANT CHANGE UP (ref 96–108)
CO2 SERPL-SCNC: 28 MMOL/L — SIGNIFICANT CHANGE UP (ref 22–31)
CREAT SERPL-MCNC: 0.62 MG/DL — SIGNIFICANT CHANGE UP (ref 0.5–1.3)
GLUCOSE BLDC GLUCOMTR-MCNC: 112 MG/DL — HIGH (ref 70–99)
GLUCOSE BLDC GLUCOMTR-MCNC: 115 MG/DL — HIGH (ref 70–99)
GLUCOSE BLDC GLUCOMTR-MCNC: 66 MG/DL — LOW (ref 70–99)
GLUCOSE BLDC GLUCOMTR-MCNC: 71 MG/DL — SIGNIFICANT CHANGE UP (ref 70–99)
GLUCOSE BLDC GLUCOMTR-MCNC: 80 MG/DL — SIGNIFICANT CHANGE UP (ref 70–99)
GLUCOSE SERPL-MCNC: 125 MG/DL — HIGH (ref 70–99)
HCT VFR BLD CALC: 34.1 % — LOW (ref 34.5–45)
HGB BLD-MCNC: 10.3 G/DL — LOW (ref 11.5–15.5)
MAGNESIUM SERPL-MCNC: 2.1 MG/DL — SIGNIFICANT CHANGE UP (ref 1.6–2.6)
MCHC RBC-ENTMCNC: 26.4 PG — LOW (ref 27–34)
MCHC RBC-ENTMCNC: 30.2 G/DL — LOW (ref 32–36)
MCV RBC AUTO: 87.4 FL — SIGNIFICANT CHANGE UP (ref 80–100)
PHOSPHATE SERPL-MCNC: 3.1 MG/DL — SIGNIFICANT CHANGE UP (ref 2.5–4.5)
PLATELET # BLD AUTO: 218 K/UL — SIGNIFICANT CHANGE UP (ref 150–400)
POTASSIUM SERPL-MCNC: 3.9 MMOL/L — SIGNIFICANT CHANGE UP (ref 3.5–5.3)
POTASSIUM SERPL-SCNC: 3.9 MMOL/L — SIGNIFICANT CHANGE UP (ref 3.5–5.3)
PROT SERPL-MCNC: 6.8 G/DL — SIGNIFICANT CHANGE UP (ref 6–8.3)
RBC # BLD: 3.9 M/UL — SIGNIFICANT CHANGE UP (ref 3.8–5.2)
RBC # FLD: 16.6 % — SIGNIFICANT CHANGE UP (ref 10.3–16.9)
SODIUM SERPL-SCNC: 142 MMOL/L — SIGNIFICANT CHANGE UP (ref 135–145)
WBC # BLD: 4.7 K/UL — SIGNIFICANT CHANGE UP (ref 3.8–10.5)
WBC # FLD AUTO: 4.7 K/UL — SIGNIFICANT CHANGE UP (ref 3.8–10.5)

## 2017-12-30 RX ORDER — ELECTROLYTE SOLUTION,INJ
1 VIAL (ML) INTRAVENOUS
Qty: 0 | Refills: 0 | Status: DISCONTINUED | OUTPATIENT
Start: 2017-12-30 | End: 2017-12-30

## 2017-12-30 RX ADMIN — Medication 50 MILLIGRAM(S): at 11:06

## 2017-12-30 RX ADMIN — NYSTATIN CREAM 1 APPLICATION(S): 100000 CREAM TOPICAL at 18:18

## 2017-12-30 RX ADMIN — Medication 5 MILLIGRAM(S): at 20:51

## 2017-12-30 RX ADMIN — GABAPENTIN 100 MILLIGRAM(S): 400 CAPSULE ORAL at 06:35

## 2017-12-30 RX ADMIN — Medication 50 MILLIGRAM(S): at 05:01

## 2017-12-30 RX ADMIN — Medication 325 MILLIGRAM(S): at 06:36

## 2017-12-30 RX ADMIN — GABAPENTIN 100 MILLIGRAM(S): 400 CAPSULE ORAL at 18:18

## 2017-12-30 RX ADMIN — Medication 50 MILLIGRAM(S): at 22:00

## 2017-12-30 RX ADMIN — Medication 325 MILLIGRAM(S): at 07:20

## 2017-12-30 RX ADMIN — ENOXAPARIN SODIUM 40 MILLIGRAM(S): 100 INJECTION SUBCUTANEOUS at 18:18

## 2017-12-30 RX ADMIN — Medication 50 MILLIGRAM(S): at 00:11

## 2017-12-30 RX ADMIN — PANTOPRAZOLE SODIUM 40 MILLIGRAM(S): 20 TABLET, DELAYED RELEASE ORAL at 06:35

## 2017-12-30 RX ADMIN — Medication 10 MILLIGRAM(S): at 06:36

## 2017-12-30 RX ADMIN — Medication 50 MILLIGRAM(S): at 17:11

## 2017-12-30 RX ADMIN — OCTREOTIDE ACETATE 450 MICROGRAM(S): 200 INJECTION, SOLUTION INTRAVENOUS; SUBCUTANEOUS at 12:28

## 2017-12-30 RX ADMIN — ESCITALOPRAM OXALATE 20 MILLIGRAM(S): 10 TABLET, FILM COATED ORAL at 20:51

## 2017-12-30 RX ADMIN — ENOXAPARIN SODIUM 40 MILLIGRAM(S): 100 INJECTION SUBCUTANEOUS at 06:36

## 2017-12-30 RX ADMIN — NYSTATIN CREAM 1 APPLICATION(S): 100000 CREAM TOPICAL at 06:36

## 2017-12-30 RX ADMIN — LOSARTAN POTASSIUM 50 MILLIGRAM(S): 100 TABLET, FILM COATED ORAL at 06:35

## 2017-12-30 RX ADMIN — Medication 10 MILLIGRAM(S): at 18:18

## 2017-12-30 NOTE — PROGRESS NOTE ADULT - SUBJECTIVE AND OBJECTIVE BOX
O/N: VSS. BRAYDEN  12/29: VSS. BRAYDEN SUBJECTIVE: Patient seen and examined bedside by chief resident. No acute events overnight. Denies fever, chills, nausea, emesis, chest pain, dyspnea, abdominal pain.     Vital Signs Last 24 Hrs  T(C): 36.4 (30 Dec 2017 05:32), Max: 37.2 (29 Dec 2017 21:56)  T(F): 97.5 (30 Dec 2017 05:32), Max: 99 (29 Dec 2017 21:56)  HR: 62 (30 Dec 2017 05:32) (62 - 63)  BP: 151/90 (30 Dec 2017 05:32) (129/82 - 151/90)  BP(mean): --  RR: 17 (30 Dec 2017 05:32) (16 - 17)  SpO2: 95% (30 Dec 2017 05:32) (94% - 96%)  I&O's Detail    29 Dec 2017 07:01  -  30 Dec 2017 07:00  --------------------------------------------------------  IN:    TPN (Total Parenteral Nutrition): 876 mL  Total IN: 876 mL    OUT:    Drain: 200 mL  Total OUT: 200 mL    Total NET: 676 mL          General: NAD, resting comfortably in bed  Pulm: Nonlabored breathing, no respiratory distress  Abd: soft, NT, wound vac functioning        LABS:                        10.3   4.7   )-----------( 218      ( 30 Dec 2017 06:23 )             34.1     12-30    142  |  105  |  35<H>  ----------------------------<  125<H>  3.9   |  28  |  0.62    Ca    9.6      30 Dec 2017 06:23  Phos  3.1     12-30  Mg     2.1     12-30    TPro  6.8  /  Alb  3.3  /  TBili  0.5  /  DBili  x   /  AST  29  /  ALT  40  /  AlkPhos  239<H>  12-30          RADIOLOGY & ADDITIONAL STUDIES:

## 2017-12-31 LAB
ANION GAP SERPL CALC-SCNC: 9 MMOL/L — SIGNIFICANT CHANGE UP (ref 5–17)
BUN SERPL-MCNC: 34 MG/DL — HIGH (ref 7–23)
CALCIUM SERPL-MCNC: 9.6 MG/DL — SIGNIFICANT CHANGE UP (ref 8.4–10.5)
CHLORIDE SERPL-SCNC: 103 MMOL/L — SIGNIFICANT CHANGE UP (ref 96–108)
CO2 SERPL-SCNC: 27 MMOL/L — SIGNIFICANT CHANGE UP (ref 22–31)
CREAT SERPL-MCNC: 0.6 MG/DL — SIGNIFICANT CHANGE UP (ref 0.5–1.3)
GLUCOSE BLDC GLUCOMTR-MCNC: 106 MG/DL — HIGH (ref 70–99)
GLUCOSE BLDC GLUCOMTR-MCNC: 117 MG/DL — HIGH (ref 70–99)
GLUCOSE BLDC GLUCOMTR-MCNC: 65 MG/DL — LOW (ref 70–99)
GLUCOSE SERPL-MCNC: 130 MG/DL — HIGH (ref 70–99)
MAGNESIUM SERPL-MCNC: 2.1 MG/DL — SIGNIFICANT CHANGE UP (ref 1.6–2.6)
PHOSPHATE SERPL-MCNC: 3.1 MG/DL — SIGNIFICANT CHANGE UP (ref 2.5–4.5)
POTASSIUM SERPL-MCNC: 3.7 MMOL/L — SIGNIFICANT CHANGE UP (ref 3.5–5.3)
POTASSIUM SERPL-SCNC: 3.7 MMOL/L — SIGNIFICANT CHANGE UP (ref 3.5–5.3)
SODIUM SERPL-SCNC: 139 MMOL/L — SIGNIFICANT CHANGE UP (ref 135–145)

## 2017-12-31 RX ORDER — ELECTROLYTE SOLUTION,INJ
1 VIAL (ML) INTRAVENOUS
Qty: 0 | Refills: 0 | Status: DISCONTINUED | OUTPATIENT
Start: 2017-12-31 | End: 2017-12-31

## 2017-12-31 RX ORDER — I.V. FAT EMULSION 20 G/100ML
0.5 EMULSION INTRAVENOUS
Qty: 50 | Refills: 0 | Status: DISCONTINUED | OUTPATIENT
Start: 2017-12-31 | End: 2017-12-31

## 2017-12-31 RX ORDER — MORPHINE SULFATE 50 MG/1
2 CAPSULE, EXTENDED RELEASE ORAL ONCE
Qty: 0 | Refills: 0 | Status: DISCONTINUED | OUTPATIENT
Start: 2017-12-31 | End: 2017-12-31

## 2017-12-31 RX ADMIN — Medication 50 MILLIGRAM(S): at 21:29

## 2017-12-31 RX ADMIN — ESCITALOPRAM OXALATE 20 MILLIGRAM(S): 10 TABLET, FILM COATED ORAL at 21:29

## 2017-12-31 RX ADMIN — Medication 1 APPLICATION(S): at 21:31

## 2017-12-31 RX ADMIN — NYSTATIN CREAM 1 APPLICATION(S): 100000 CREAM TOPICAL at 18:09

## 2017-12-31 RX ADMIN — MORPHINE SULFATE 2 MILLIGRAM(S): 50 CAPSULE, EXTENDED RELEASE ORAL at 18:19

## 2017-12-31 RX ADMIN — PANTOPRAZOLE SODIUM 40 MILLIGRAM(S): 20 TABLET, DELAYED RELEASE ORAL at 06:33

## 2017-12-31 RX ADMIN — MORPHINE SULFATE 2 MILLIGRAM(S): 50 CAPSULE, EXTENDED RELEASE ORAL at 17:56

## 2017-12-31 RX ADMIN — GABAPENTIN 100 MILLIGRAM(S): 400 CAPSULE ORAL at 06:33

## 2017-12-31 RX ADMIN — SODIUM CHLORIDE 10 MILLILITER(S): 9 INJECTION INTRAMUSCULAR; INTRAVENOUS; SUBCUTANEOUS at 04:10

## 2017-12-31 RX ADMIN — Medication 50 MILLIGRAM(S): at 04:09

## 2017-12-31 RX ADMIN — Medication 10 MILLIGRAM(S): at 06:33

## 2017-12-31 RX ADMIN — Medication 325 MILLIGRAM(S): at 21:29

## 2017-12-31 RX ADMIN — I.V. FAT EMULSION 20.83 GM/KG/DAY: 20 EMULSION INTRAVENOUS at 22:01

## 2017-12-31 RX ADMIN — Medication 50 MILLIGRAM(S): at 16:00

## 2017-12-31 RX ADMIN — ONDANSETRON 4 MILLIGRAM(S): 8 TABLET, FILM COATED ORAL at 18:13

## 2017-12-31 RX ADMIN — Medication 10 MILLIGRAM(S): at 18:08

## 2017-12-31 RX ADMIN — Medication 5 MILLIGRAM(S): at 21:29

## 2017-12-31 RX ADMIN — ENOXAPARIN SODIUM 40 MILLIGRAM(S): 100 INJECTION SUBCUTANEOUS at 18:07

## 2017-12-31 RX ADMIN — ENOXAPARIN SODIUM 40 MILLIGRAM(S): 100 INJECTION SUBCUTANEOUS at 06:33

## 2017-12-31 RX ADMIN — Medication 50 MILLIGRAM(S): at 10:05

## 2017-12-31 RX ADMIN — OCTREOTIDE ACETATE 450 MICROGRAM(S): 200 INJECTION, SOLUTION INTRAVENOUS; SUBCUTANEOUS at 12:51

## 2017-12-31 RX ADMIN — GABAPENTIN 100 MILLIGRAM(S): 400 CAPSULE ORAL at 18:07

## 2017-12-31 RX ADMIN — Medication 325 MILLIGRAM(S): at 21:59

## 2017-12-31 RX ADMIN — LOSARTAN POTASSIUM 50 MILLIGRAM(S): 100 TABLET, FILM COATED ORAL at 06:33

## 2017-12-31 NOTE — PROGRESS NOTE ADULT - SUBJECTIVE AND OBJECTIVE BOX
o/n: BRAYDEN, VSS  12/30: VSS, BRAYDEN      Vital Signs Last 24 Hrs  T(C): 36.6 (31 Dec 2017 05:40), Max: 37.1 (30 Dec 2017 08:40)  T(F): 97.8 (31 Dec 2017 05:40), Max: 98.7 (30 Dec 2017 08:40)  HR: 71 (31 Dec 2017 05:40) (57 - 73)  BP: 156/81 (31 Dec 2017 05:40) (138/95 - 156/81)  BP(mean): --  RR: 16 (31 Dec 2017 05:40) (16 - 17)  SpO2: 99% (31 Dec 2017 05:40) (97% - 99%)        I&O's Summary    29 Dec 2017 07:01  -  30 Dec 2017 07:00  --------------------------------------------------------  IN: 876 mL / OUT: 200 mL / NET: 676 mL    30 Dec 2017 07:01  -  31 Dec 2017 06:30  --------------------------------------------------------  IN: 876 mL / OUT: 1000 mL / NET: -124 mL      Gen: NAD   Abd: soft, nt / nd   wound vac in place, draining fistula

## 2017-12-31 NOTE — PROGRESS NOTE ADULT - ASSESSMENT
57 F s/p  SBR, fistula resection, esophagojejunostomy revision w/ post-op course complicated by RTOR for distal anastomosis breakdown, ATN, acute respiratory failure, & septic shock, MRSA bacteremia which resolved. Currently for wound care management.     NPO/TPN   Pain/nausea control - only benadryl at midnight and dilaudid 0.25mg during PT session   IS/OOB with PT daily  ISS  ID abx course complete: (Vanco 12/1 - 12/25), Nystatin powder  Benadryl for abdominal pruritus  SCDs, lovenox (therapeutic), OOBA  Lines :  L left subclavian line (10/4-12/2), L IJ (12/2--)  Colostomy w/ suction

## 2018-01-01 LAB
ALBUMIN SERPL ELPH-MCNC: 3.8 G/DL — SIGNIFICANT CHANGE UP (ref 3.3–5)
ALP SERPL-CCNC: 306 U/L — HIGH (ref 40–120)
ALT FLD-CCNC: 62 U/L — HIGH (ref 10–45)
ANION GAP SERPL CALC-SCNC: 13 MMOL/L — SIGNIFICANT CHANGE UP (ref 5–17)
AST SERPL-CCNC: 38 U/L — SIGNIFICANT CHANGE UP (ref 10–40)
BILIRUB SERPL-MCNC: 0.6 MG/DL — SIGNIFICANT CHANGE UP (ref 0.2–1.2)
BUN SERPL-MCNC: 42 MG/DL — HIGH (ref 7–23)
CALCIUM SERPL-MCNC: 10.4 MG/DL — SIGNIFICANT CHANGE UP (ref 8.4–10.5)
CHLORIDE SERPL-SCNC: 104 MMOL/L — SIGNIFICANT CHANGE UP (ref 96–108)
CO2 SERPL-SCNC: 28 MMOL/L — SIGNIFICANT CHANGE UP (ref 22–31)
CREAT SERPL-MCNC: 0.65 MG/DL — SIGNIFICANT CHANGE UP (ref 0.5–1.3)
GLUCOSE BLDC GLUCOMTR-MCNC: 107 MG/DL — HIGH (ref 70–99)
GLUCOSE BLDC GLUCOMTR-MCNC: 116 MG/DL — HIGH (ref 70–99)
GLUCOSE BLDC GLUCOMTR-MCNC: 76 MG/DL — SIGNIFICANT CHANGE UP (ref 70–99)
GLUCOSE BLDC GLUCOMTR-MCNC: 96 MG/DL — SIGNIFICANT CHANGE UP (ref 70–99)
GLUCOSE SERPL-MCNC: 92 MG/DL — SIGNIFICANT CHANGE UP (ref 70–99)
HCT VFR BLD CALC: 44.1 % — SIGNIFICANT CHANGE UP (ref 34.5–45)
HGB BLD-MCNC: 13.3 G/DL — SIGNIFICANT CHANGE UP (ref 11.5–15.5)
MAGNESIUM SERPL-MCNC: 2.4 MG/DL — SIGNIFICANT CHANGE UP (ref 1.6–2.6)
MCHC RBC-ENTMCNC: 26.6 PG — LOW (ref 27–34)
MCHC RBC-ENTMCNC: 30.2 G/DL — LOW (ref 32–36)
MCV RBC AUTO: 88.2 FL — SIGNIFICANT CHANGE UP (ref 80–100)
PHOSPHATE SERPL-MCNC: 3.6 MG/DL — SIGNIFICANT CHANGE UP (ref 2.5–4.5)
PLATELET # BLD AUTO: 250 K/UL — SIGNIFICANT CHANGE UP (ref 150–400)
POTASSIUM SERPL-MCNC: 4.3 MMOL/L — SIGNIFICANT CHANGE UP (ref 3.5–5.3)
POTASSIUM SERPL-SCNC: 4.3 MMOL/L — SIGNIFICANT CHANGE UP (ref 3.5–5.3)
PROT SERPL-MCNC: 8.7 G/DL — HIGH (ref 6–8.3)
RBC # BLD: 5 M/UL — SIGNIFICANT CHANGE UP (ref 3.8–5.2)
RBC # FLD: 16.8 % — SIGNIFICANT CHANGE UP (ref 10.3–16.9)
SODIUM SERPL-SCNC: 145 MMOL/L — SIGNIFICANT CHANGE UP (ref 135–145)
WBC # BLD: 7.4 K/UL — SIGNIFICANT CHANGE UP (ref 3.8–10.5)
WBC # FLD AUTO: 7.4 K/UL — SIGNIFICANT CHANGE UP (ref 3.8–10.5)

## 2018-01-01 RX ORDER — ELECTROLYTE SOLUTION,INJ
1 VIAL (ML) INTRAVENOUS
Qty: 0 | Refills: 0 | Status: DISCONTINUED | OUTPATIENT
Start: 2018-01-01 | End: 2018-01-01

## 2018-01-01 RX ADMIN — NYSTATIN CREAM 1 APPLICATION(S): 100000 CREAM TOPICAL at 18:56

## 2018-01-01 RX ADMIN — ENOXAPARIN SODIUM 40 MILLIGRAM(S): 100 INJECTION SUBCUTANEOUS at 06:48

## 2018-01-01 RX ADMIN — ENOXAPARIN SODIUM 40 MILLIGRAM(S): 100 INJECTION SUBCUTANEOUS at 19:17

## 2018-01-01 RX ADMIN — Medication 1 EACH: at 19:17

## 2018-01-01 RX ADMIN — OCTREOTIDE ACETATE 450 MICROGRAM(S): 200 INJECTION, SOLUTION INTRAVENOUS; SUBCUTANEOUS at 12:44

## 2018-01-01 RX ADMIN — ONDANSETRON 4 MILLIGRAM(S): 8 TABLET, FILM COATED ORAL at 23:12

## 2018-01-01 RX ADMIN — GABAPENTIN 100 MILLIGRAM(S): 400 CAPSULE ORAL at 19:16

## 2018-01-01 RX ADMIN — ONDANSETRON 4 MILLIGRAM(S): 8 TABLET, FILM COATED ORAL at 16:21

## 2018-01-01 RX ADMIN — CALCITRIOL 1 MICROGRAM(S): 0.5 CAPSULE ORAL at 12:43

## 2018-01-01 RX ADMIN — Medication 50 MILLIGRAM(S): at 19:17

## 2018-01-01 RX ADMIN — GABAPENTIN 100 MILLIGRAM(S): 400 CAPSULE ORAL at 06:47

## 2018-01-01 RX ADMIN — NYSTATIN CREAM 1 APPLICATION(S): 100000 CREAM TOPICAL at 06:47

## 2018-01-01 RX ADMIN — Medication 5 MILLIGRAM(S): at 21:44

## 2018-01-01 RX ADMIN — PANTOPRAZOLE SODIUM 40 MILLIGRAM(S): 20 TABLET, DELAYED RELEASE ORAL at 06:47

## 2018-01-01 RX ADMIN — Medication 325 MILLIGRAM(S): at 19:16

## 2018-01-01 RX ADMIN — Medication 100 UNIT(S): at 12:44

## 2018-01-01 RX ADMIN — ESCITALOPRAM OXALATE 20 MILLIGRAM(S): 10 TABLET, FILM COATED ORAL at 21:44

## 2018-01-01 RX ADMIN — Medication 325 MILLIGRAM(S): at 06:47

## 2018-01-01 RX ADMIN — Medication 1 APPLICATION(S): at 12:43

## 2018-01-01 RX ADMIN — Medication 10 MILLIGRAM(S): at 06:46

## 2018-01-01 RX ADMIN — Medication 325 MILLIGRAM(S): at 23:13

## 2018-01-01 RX ADMIN — Medication 325 MILLIGRAM(S): at 20:35

## 2018-01-01 RX ADMIN — LOSARTAN POTASSIUM 50 MILLIGRAM(S): 100 TABLET, FILM COATED ORAL at 06:46

## 2018-01-01 RX ADMIN — Medication 50 MILLIGRAM(S): at 12:43

## 2018-01-01 RX ADMIN — Medication 325 MILLIGRAM(S): at 07:15

## 2018-01-01 RX ADMIN — Medication 50 MILLIGRAM(S): at 04:39

## 2018-01-01 RX ADMIN — Medication 10 MILLIGRAM(S): at 19:17

## 2018-01-01 NOTE — PROGRESS NOTE ADULT - SUBJECTIVE AND OBJECTIVE BOX
o/n: BRAYDEN   12/31: Vac changed SUBJECTIVE: Patient seen and examined bedside by chief resident. No acute events overnight. Denies fever, chills, nausea, emesis, chest pain, dyspnea, abdominal pain.       Vital Signs Last 24 Hrs  T(C): 36.4 (01 Jan 2018 05:27), Max: 37.3 (31 Dec 2017 21:00)  T(F): 97.6 (01 Jan 2018 05:27), Max: 99.1 (31 Dec 2017 21:00)  HR: 69 (01 Jan 2018 05:27) (67 - 87)  BP: 131/84 (01 Jan 2018 05:27) (113/75 - 157/83)  BP(mean): --  RR: 17 (01 Jan 2018 05:27) (17 - 18)  SpO2: 95% (01 Jan 2018 05:27) (95% - 100%)  I&O's Detail    31 Dec 2017 07:01  -  01 Jan 2018 07:00  --------------------------------------------------------  IN:    fat emulsion (Plant Based) 20% Infusion: 166.4 mL    TPN (Total Parenteral Nutrition): 1606 mL  Total IN: 1772.4 mL    OUT:  Total OUT: 0 mL    Total NET: 1772.4 mL          General: NAD, resting comfortably in bed  Pulm: Nonlabored breathing, no respiratory distress  Abd: soft, NT, wound vac functioining        LABS:    12-31    139  |  103  |  34<H>  ----------------------------<  130<H>  3.7   |  27  |  0.60    Ca    9.6      31 Dec 2017 05:47  Phos  3.1     12-31  Mg     2.1     12-31            RADIOLOGY & ADDITIONAL STUDIES:

## 2018-01-01 NOTE — PHYSICAL THERAPY INITIAL EVALUATION ADULT - ADDITIONAL COMMENTS
Statement Selected
Patient presents from [facility]acute [facility] (has been here at [facility], [place] x ~3 months, then was discharged to [facility]hab x 2 weeks, now readmitted). Patient states that at rehab, she was ambulating "a few steps" with 2 person assist, rolling walker, and chair follow.

## 2018-01-02 LAB
ANION GAP SERPL CALC-SCNC: 8 MMOL/L — SIGNIFICANT CHANGE UP (ref 5–17)
BUN SERPL-MCNC: 40 MG/DL — HIGH (ref 7–23)
CALCIUM SERPL-MCNC: 9.7 MG/DL — SIGNIFICANT CHANGE UP (ref 8.4–10.5)
CHLORIDE SERPL-SCNC: 104 MMOL/L — SIGNIFICANT CHANGE UP (ref 96–108)
CO2 SERPL-SCNC: 29 MMOL/L — SIGNIFICANT CHANGE UP (ref 22–31)
CREAT SERPL-MCNC: 0.63 MG/DL — SIGNIFICANT CHANGE UP (ref 0.5–1.3)
GLUCOSE BLDC GLUCOMTR-MCNC: 101 MG/DL — HIGH (ref 70–99)
GLUCOSE BLDC GLUCOMTR-MCNC: 110 MG/DL — HIGH (ref 70–99)
GLUCOSE BLDC GLUCOMTR-MCNC: 93 MG/DL — SIGNIFICANT CHANGE UP (ref 70–99)
GLUCOSE BLDC GLUCOMTR-MCNC: 94 MG/DL — SIGNIFICANT CHANGE UP (ref 70–99)
GLUCOSE SERPL-MCNC: 110 MG/DL — HIGH (ref 70–99)
MAGNESIUM SERPL-MCNC: 2.3 MG/DL — SIGNIFICANT CHANGE UP (ref 1.6–2.6)
PHOSPHATE SERPL-MCNC: 3.5 MG/DL — SIGNIFICANT CHANGE UP (ref 2.5–4.5)
POTASSIUM SERPL-MCNC: 4.6 MMOL/L — SIGNIFICANT CHANGE UP (ref 3.5–5.3)
POTASSIUM SERPL-SCNC: 4.6 MMOL/L — SIGNIFICANT CHANGE UP (ref 3.5–5.3)
SODIUM SERPL-SCNC: 141 MMOL/L — SIGNIFICANT CHANGE UP (ref 135–145)

## 2018-01-02 PROCEDURE — 71045 X-RAY EXAM CHEST 1 VIEW: CPT | Mod: 26

## 2018-01-02 RX ORDER — ESCITALOPRAM OXALATE 10 MG/1
20 TABLET, FILM COATED ORAL DAILY
Qty: 0 | Refills: 0 | Status: DISCONTINUED | OUTPATIENT
Start: 2018-01-02 | End: 2018-01-30

## 2018-01-02 RX ORDER — I.V. FAT EMULSION 20 G/100ML
0.5 EMULSION INTRAVENOUS
Qty: 50 | Refills: 0 | Status: DISCONTINUED | OUTPATIENT
Start: 2018-01-02 | End: 2018-01-02

## 2018-01-02 RX ORDER — ACETAMINOPHEN 500 MG
1000 TABLET ORAL ONCE
Qty: 0 | Refills: 0 | Status: COMPLETED | OUTPATIENT
Start: 2018-01-02 | End: 2018-01-02

## 2018-01-02 RX ORDER — ELECTROLYTE SOLUTION,INJ
1 VIAL (ML) INTRAVENOUS
Qty: 0 | Refills: 0 | Status: DISCONTINUED | OUTPATIENT
Start: 2018-01-02 | End: 2018-01-02

## 2018-01-02 RX ADMIN — GABAPENTIN 100 MILLIGRAM(S): 400 CAPSULE ORAL at 06:38

## 2018-01-02 RX ADMIN — Medication 1 APPLICATION(S): at 11:13

## 2018-01-02 RX ADMIN — Medication 10 MILLIGRAM(S): at 17:14

## 2018-01-02 RX ADMIN — Medication 325 MILLIGRAM(S): at 11:07

## 2018-01-02 RX ADMIN — Medication 1 EACH: at 17:14

## 2018-01-02 RX ADMIN — Medication 100 UNIT(S): at 11:08

## 2018-01-02 RX ADMIN — LOSARTAN POTASSIUM 50 MILLIGRAM(S): 100 TABLET, FILM COATED ORAL at 06:38

## 2018-01-02 RX ADMIN — Medication 325 MILLIGRAM(S): at 19:46

## 2018-01-02 RX ADMIN — GABAPENTIN 100 MILLIGRAM(S): 400 CAPSULE ORAL at 17:14

## 2018-01-02 RX ADMIN — Medication 50 MILLIGRAM(S): at 17:14

## 2018-01-02 RX ADMIN — NYSTATIN CREAM 1 APPLICATION(S): 100000 CREAM TOPICAL at 06:41

## 2018-01-02 RX ADMIN — Medication 50 MILLIGRAM(S): at 22:00

## 2018-01-02 RX ADMIN — Medication 325 MILLIGRAM(S): at 06:41

## 2018-01-02 RX ADMIN — Medication 400 MILLIGRAM(S): at 21:45

## 2018-01-02 RX ADMIN — Medication 10 MILLIGRAM(S): at 06:38

## 2018-01-02 RX ADMIN — Medication 325 MILLIGRAM(S): at 12:03

## 2018-01-02 RX ADMIN — Medication 325 MILLIGRAM(S): at 15:43

## 2018-01-02 RX ADMIN — ENOXAPARIN SODIUM 40 MILLIGRAM(S): 100 INJECTION SUBCUTANEOUS at 17:14

## 2018-01-02 RX ADMIN — Medication 50 MILLIGRAM(S): at 01:27

## 2018-01-02 RX ADMIN — Medication 50 MILLIGRAM(S): at 11:07

## 2018-01-02 RX ADMIN — Medication 325 MILLIGRAM(S): at 00:00

## 2018-01-02 RX ADMIN — Medication 325 MILLIGRAM(S): at 16:43

## 2018-01-02 RX ADMIN — ONDANSETRON 4 MILLIGRAM(S): 8 TABLET, FILM COATED ORAL at 11:07

## 2018-01-02 RX ADMIN — I.V. FAT EMULSION 20.83 GM/KG/DAY: 20 EMULSION INTRAVENOUS at 21:44

## 2018-01-02 RX ADMIN — ENOXAPARIN SODIUM 40 MILLIGRAM(S): 100 INJECTION SUBCUTANEOUS at 06:38

## 2018-01-02 RX ADMIN — ESCITALOPRAM OXALATE 20 MILLIGRAM(S): 10 TABLET, FILM COATED ORAL at 21:44

## 2018-01-02 RX ADMIN — OCTREOTIDE ACETATE 450 MICROGRAM(S): 200 INJECTION, SOLUTION INTRAVENOUS; SUBCUTANEOUS at 11:07

## 2018-01-02 RX ADMIN — Medication 50 MILLIGRAM(S): at 06:37

## 2018-01-02 RX ADMIN — PANTOPRAZOLE SODIUM 40 MILLIGRAM(S): 20 TABLET, DELAYED RELEASE ORAL at 06:37

## 2018-01-02 RX ADMIN — Medication 5 MILLIGRAM(S): at 21:44

## 2018-01-02 RX ADMIN — ONDANSETRON 4 MILLIGRAM(S): 8 TABLET, FILM COATED ORAL at 17:14

## 2018-01-02 RX ADMIN — ESCITALOPRAM OXALATE 20 MILLIGRAM(S): 10 TABLET, FILM COATED ORAL at 11:08

## 2018-01-02 RX ADMIN — ONDANSETRON 4 MILLIGRAM(S): 8 TABLET, FILM COATED ORAL at 23:35

## 2018-01-02 RX ADMIN — Medication 325 MILLIGRAM(S): at 20:30

## 2018-01-02 NOTE — PROGRESS NOTE ADULT - SUBJECTIVE AND OBJECTIVE BOX
O/N: BRAYDEN, VSS, wound cx w/ enterococcus faecalis (vanc resistant)   1/1: BRAYDEN, VSS O/N: BRAYDEN, VSS, wound cx w/ enterococcus faecalis (vanc resistant)   1/1: BRAYDEN, VSS    INTERVAL HPI/OVERNIGHT EVENTS:      SUBJECTIVE: Pt seen and examined at bedside. no acute complaints. Vac seal intact.   Flatus: [ ] YES [x ] NO             Bowel Movement: [ ] YES [x ] NO  Pain (0-10):            Pain Control Adequate: [x ] YES [ ] NO  Nausea: [ ] YES [x ] NO            Vomiting: [ ] YES [x ] NO  Diarrhea: [ ] YES [x ] NO         Constipation: [ ] YES [x ] NO     Chest Pain: [ ] YES [x ] NO    SOB:  [ ] YES [ x] NO    MEDICATIONS  (STANDING):  bismuth subsalicylate Liquid 30 milliLiter(s) Oral daily  calcitriol Injectable 1 MICROGram(s) IV Push <User Schedule>  collagenase Ointment 1 Application(s) Topical daily  cyanocobalamin Injectable 1000 MICROGram(s) SubCutaneous every 7 days  dextrose 5%. 1000 milliLiter(s) (50 mL/Hr) IV Continuous <Continuous>  dextrose 50% Injectable 25 Gram(s) IV Push once  dextrose 50% Injectable 12.5 Gram(s) IV Push once  diazepam    Tablet 5 milliGRAM(s) Oral at bedtime  diphenhydrAMINE   Injectable 50 milliGRAM(s) IV Push every 6 hours  enoxaparin Injectable 40 milliGRAM(s) SubCutaneous two times a day  escitalopram 20 milliGRAM(s) Oral daily  gabapentin 100 milliGRAM(s) Oral two times a day  heparin  flush 100 Units/mL Injectable 100 Unit(s) IV Push every other day  heparin  flush 100 Units/mL Injectable 100 Unit(s) IV Push every other day  insulin lispro (HumaLOG) corrective regimen sliding scale   SubCutaneous every 6 hours  lidocaine   Patch 1 Patch Transdermal daily  losartan 50 milliGRAM(s) Oral daily  nystatin Powder 1 Application(s) Topical two times a day  octreotide  Injectable 450 MICROGram(s) IV Push daily  oxybutynin 10 milliGRAM(s) Oral two times a day  pantoprazole  Injectable 40 milliGRAM(s) IV Push daily  Parenteral Nutrition - Adult 1 Each (73 mL/Hr) TPN Continuous <Continuous>  sodium chloride 0.9% lock flush 20 milliLiter(s) IV Push once    MEDICATIONS  (PRN):  acetaminophen   Tablet. 325 milliGRAM(s) Oral every 4 hours PRN Moderate Pain (4 - 6)  ondansetron Injectable 4 milliGRAM(s) IV Push every 6 hours PRN Nausea and/or Vomiting  sodium chloride 0.9% lock flush 10 milliLiter(s) IV Push every 1 hour PRN After each medication administration  sodium chloride 0.9% lock flush 10 milliLiter(s) IV Push every 12 hours PRN Lumen of catheter NOT used      Vital Signs Last 24 Hrs  T(C): 36.5 (02 Jan 2018 05:43), Max: 37.4 (01 Jan 2018 17:56)  T(F): 97.7 (02 Jan 2018 05:43), Max: 99.4 (01 Jan 2018 17:56)  HR: 65 (02 Jan 2018 05:43) (63 - 72)  BP: 157/94 (02 Jan 2018 05:43) (128/78 - 164/97)  BP(mean): --  RR: 17 (02 Jan 2018 05:43) (16 - 18)  SpO2: 97% (02 Jan 2018 05:43) (94% - 98%)    PHYSICAL EXAM:      Constitutional: A&Ox3    Respiratory: non labored breathing, no respiratory distress    Cardiovascular:  RRR    Gastrointestinal: Wound vac in place with good seal    Extremities: (-) edema                  I&O's Detail    01 Jan 2018 07:01  -  02 Jan 2018 07:00  --------------------------------------------------------  IN:    Oral Fluid: 120 mL    TPN (Total Parenteral Nutrition): 803 mL  Total IN: 923 mL    OUT:    Drain: 500 mL  Total OUT: 500 mL    Total NET: 423 mL          LABS:                        13.3   7.4   )-----------( 250      ( 01 Jan 2018 11:57 )             44.1     01-01    145  |  104  |  42<H>  ----------------------------<  92  4.3   |  28  |  0.65    Ca    10.4      01 Jan 2018 11:57  Phos  3.6     01-01  Mg     2.4     01-01    TPro  8.7<H>  /  Alb  3.8  /  TBili  0.6  /  DBili  x   /  AST  38  /  ALT  62<H>  /  AlkPhos  306<H>  01-01          RADIOLOGY & ADDITIONAL STUDIES:

## 2018-01-02 NOTE — CHART NOTE - NSCHARTNOTEFT_GEN_A_CORE
Admitting Diagnosis:   57 F s/p  SBR, fistula resection, esophagojejunostomy revision w/ post-op course complicated by RTOR for distal anastomosis breakdown, ATN, acute respiratory failure, & septic shock, MRSA bacteremia which resolved. Currently for wound care management.       PAST MEDICAL & SURGICAL HISTORY:  Reflux  Obesity  DVT (deep venous thrombosis): 2013 neck  Diverticulitis  Hypertension  Elective surgery: abodominal wall surgery  dec 2016  Elective surgery: NOV 2016 gastric bypass revision  Gastric bypass status for obesity: gastric sleeve, 6/2012  S/P breast biopsy: 2011, left  S/P colon resection: 2011  S/P knee surgery: repair 2009, 2011  right; left  Umbilical hernia: 2000  S/P appendectomy: 1975  S/P tonsillectomy: 1967      Current Nutrition Order: NPO except ice chips and water  TPN via central venous line:  1752 TV (73ml/hr); 355g D, 140g AA, 50g 20% lipids (2267kcal calories): (2267 kcal, 2.7g/kg IBW pro, GIR 3.16 based on most recent standing wt)       PO Intake: Good (%) [   ]  Fair (50-75%) [   ] Poor (<25%) [   ] -n/a    GI Issues: none reported    Pain: pt w/ no c/o pain    Skin Integrity: w/ sacrum unstageable PU; sacral abrasion, and abd wound    Labs:   01-02    141  |  104  |  40<H>  ----------------------------<  110<H>  4.6   |  29  |  0.63    Ca    9.7      02 Jan 2018 07:03  Phos  3.5     01-02  Mg     2.3     01-02    TPro  8.7<H>  /  Alb  3.8  /  TBili  0.6  /  DBili  x   /  AST  38  /  ALT  62<H>  /  AlkPhos  306<H>  01-01    CAPILLARY BLOOD GLUCOSE      POCT Blood Glucose.: 93 mg/dL (02 Jan 2018 14:38)  POCT Blood Glucose.: 101 mg/dL (02 Jan 2018 06:19)  POCT Blood Glucose.: 110 mg/dL (02 Jan 2018 00:07)  POCT Blood Glucose.: 96 mg/dL (01 Jan 2018 17:42)      Medications:  MEDICATIONS  (STANDING):  bismuth subsalicylate Liquid 30 milliLiter(s) Oral daily  calcitriol Injectable 1 MICROGram(s) IV Push <User Schedule>  collagenase Ointment 1 Application(s) Topical daily  cyanocobalamin Injectable 1000 MICROGram(s) SubCutaneous every 7 days  dextrose 5%. 1000 milliLiter(s) (50 mL/Hr) IV Continuous <Continuous>  dextrose 50% Injectable 25 Gram(s) IV Push once  dextrose 50% Injectable 12.5 Gram(s) IV Push once  diazepam    Tablet 5 milliGRAM(s) Oral at bedtime  diphenhydrAMINE   Injectable 50 milliGRAM(s) IV Push every 6 hours  enoxaparin Injectable 40 milliGRAM(s) SubCutaneous two times a day  escitalopram 20 milliGRAM(s) Oral daily  fat emulsion (Plant Based) 20% Infusion 0.502 Gm/kG/Day (20.83 mL/Hr) IV Continuous <Continuous>  gabapentin 100 milliGRAM(s) Oral two times a day  heparin  flush 100 Units/mL Injectable 100 Unit(s) IV Push every other day  heparin  flush 100 Units/mL Injectable 100 Unit(s) IV Push every other day  insulin lispro (HumaLOG) corrective regimen sliding scale   SubCutaneous every 6 hours  lidocaine   Patch 1 Patch Transdermal daily  losartan 50 milliGRAM(s) Oral daily  nystatin Powder 1 Application(s) Topical two times a day  octreotide  Injectable 450 MICROGram(s) IV Push daily  oxybutynin 10 milliGRAM(s) Oral two times a day  pantoprazole  Injectable 40 milliGRAM(s) IV Push daily  Parenteral Nutrition - Adult 1 Each (73 mL/Hr) TPN Continuous <Continuous>  Parenteral Nutrition - Adult 1 Each (73 mL/Hr) TPN Continuous <Continuous>  sodium chloride 0.9% lock flush 20 milliLiter(s) IV Push once    MEDICATIONS  (PRN):  acetaminophen   Tablet. 325 milliGRAM(s) Oral every 4 hours PRN Moderate Pain (4 - 6)  ondansetron Injectable 4 milliGRAM(s) IV Push every 6 hours PRN Nausea and/or Vomiting  sodium chloride 0.9% lock flush 10 milliLiter(s) IV Push every 1 hour PRN After each medication administration  sodium chloride 0.9% lock flush 10 milliLiter(s) IV Push every 12 hours PRN Lumen of catheter NOT used      Weight:  172.5 (12/19)  171.5lb (12/14)  167lb (11/13)  164lb (10/17)  219lb (8/1)        Weight Change:   03/2016: 89kg  02/2017: 74kg   07/2017: 76kg  11/2017: 76kg     pt weighed herself during PT~2 days ago on scale and it was 171.6# (78kg)-not recorded in the chart    Estimated energy needs:   Estimated energy needs using 52 kg IBW:  increased needs per wound and pressure ulcer healing. needs calculated based on TPN as primary route of nutrition.  30-35 kcal/kg (8710-1923 kcal).   1.8-2 g/kg ( g protein).  30-35 mL/kg (2224-9231 mL fluid).       Subjective: pt reports no GI stress or pain at this time, tolerates po ice chips and Italian ice; very eager to start eating again, pt understands that receiving nutritional needs through TPN, which is providing >100% kcal and 135% protein needs. TPN bag infusing at 73ml/hr providing 355g Dex, 140g AA, 50g 20% lipids (2267 kcal (43.5kcal w/lipid solution; 1767kcal 33kcal/ibw-w/o lipid solution), 2.7g pro/kg IBW pro, GIR 3.16-based most recent standing wt). Team made aware of excess protein needs and adjustment will be made accordingly.       Previous Nutrition Diagnosis:  Increased nutrient needs RT increased demand for nutrients AEB wound and pressure ulcer healing   Active [  X]  Resolved [   ]    If resolved, new PES: Excessive TPN infusion RT current TPN order AEB pt received >100% kcal (with and without lipid) and protein needs    Goal: Pt to meet >% EER via tolerated route    Recommendations:  1) please change TPN order to 330g Dex, 104g AA, 50g 20% lipids (2038kcal w/ lipid solution; 100% of protein needs; GIR: 2.9 Based on most recent stated wt- 78kg )  2) Monitor triglycerides & adjust lipids per MD discretion   3) Please continue to take standing weight for trending purposes      Education: Understands meeting nutrient needs via TPN.    Risk Level: High [  X ] Moderate [   ] Low [   ].

## 2018-01-03 LAB
ANION GAP SERPL CALC-SCNC: 12 MMOL/L — SIGNIFICANT CHANGE UP (ref 5–17)
APPEARANCE UR: (no result)
BASOPHILS NFR BLD AUTO: 0.1 % — SIGNIFICANT CHANGE UP (ref 0–2)
BILIRUB UR-MCNC: NEGATIVE — SIGNIFICANT CHANGE UP
BUN SERPL-MCNC: 34 MG/DL — HIGH (ref 7–23)
CALCIUM SERPL-MCNC: 9.7 MG/DL — SIGNIFICANT CHANGE UP (ref 8.4–10.5)
CHLORIDE SERPL-SCNC: 99 MMOL/L — SIGNIFICANT CHANGE UP (ref 96–108)
CO2 SERPL-SCNC: 25 MMOL/L — SIGNIFICANT CHANGE UP (ref 22–31)
COLOR SPEC: YELLOW — SIGNIFICANT CHANGE UP
CREAT SERPL-MCNC: 0.7 MG/DL — SIGNIFICANT CHANGE UP (ref 0.5–1.3)
CULTURE RESULTS: SIGNIFICANT CHANGE UP
DIFF PNL FLD: NEGATIVE — SIGNIFICANT CHANGE UP
EOSINOPHIL NFR BLD AUTO: 0.5 % — SIGNIFICANT CHANGE UP (ref 0–6)
GLUCOSE BLDC GLUCOMTR-MCNC: 144 MG/DL — HIGH (ref 70–99)
GLUCOSE BLDC GLUCOMTR-MCNC: 200 MG/DL — HIGH (ref 70–99)
GLUCOSE BLDC GLUCOMTR-MCNC: 94 MG/DL — SIGNIFICANT CHANGE UP (ref 70–99)
GLUCOSE BLDC GLUCOMTR-MCNC: 96 MG/DL — SIGNIFICANT CHANGE UP (ref 70–99)
GLUCOSE SERPL-MCNC: 117 MG/DL — HIGH (ref 70–99)
GLUCOSE UR QL: NEGATIVE — SIGNIFICANT CHANGE UP
HCT VFR BLD CALC: 33.4 % — LOW (ref 34.5–45)
HGB BLD-MCNC: 10.5 G/DL — LOW (ref 11.5–15.5)
KETONES UR-MCNC: NEGATIVE — SIGNIFICANT CHANGE UP
LEUKOCYTE ESTERASE UR-ACNC: NEGATIVE — SIGNIFICANT CHANGE UP
LYMPHOCYTES # BLD AUTO: 17.6 % — SIGNIFICANT CHANGE UP (ref 13–44)
MAGNESIUM SERPL-MCNC: 2.1 MG/DL — SIGNIFICANT CHANGE UP (ref 1.6–2.6)
MCHC RBC-ENTMCNC: 27.1 PG — SIGNIFICANT CHANGE UP (ref 27–34)
MCHC RBC-ENTMCNC: 31.4 G/DL — LOW (ref 32–36)
MCV RBC AUTO: 86.1 FL — SIGNIFICANT CHANGE UP (ref 80–100)
MONOCYTES NFR BLD AUTO: 9.1 % — SIGNIFICANT CHANGE UP (ref 2–14)
NEUTROPHILS NFR BLD AUTO: 72.7 % — SIGNIFICANT CHANGE UP (ref 43–77)
NITRITE UR-MCNC: POSITIVE
ORGANISM # SPEC MICROSCOPIC CNT: SIGNIFICANT CHANGE UP
ORGANISM # SPEC MICROSCOPIC CNT: SIGNIFICANT CHANGE UP
PH UR: 6 — SIGNIFICANT CHANGE UP (ref 5–8)
PHOSPHATE SERPL-MCNC: 3.7 MG/DL — SIGNIFICANT CHANGE UP (ref 2.5–4.5)
PLATELET # BLD AUTO: 190 K/UL — SIGNIFICANT CHANGE UP (ref 150–400)
POTASSIUM SERPL-MCNC: 4.3 MMOL/L — SIGNIFICANT CHANGE UP (ref 3.5–5.3)
POTASSIUM SERPL-SCNC: 4.3 MMOL/L — SIGNIFICANT CHANGE UP (ref 3.5–5.3)
PROT UR-MCNC: NEGATIVE MG/DL — SIGNIFICANT CHANGE UP
RBC # BLD: 3.88 M/UL — SIGNIFICANT CHANGE UP (ref 3.8–5.2)
RBC # FLD: 16.8 % — SIGNIFICANT CHANGE UP (ref 10.3–16.9)
SODIUM SERPL-SCNC: 136 MMOL/L — SIGNIFICANT CHANGE UP (ref 135–145)
SP GR SPEC: 1.02 — SIGNIFICANT CHANGE UP (ref 1–1.03)
SPECIMEN SOURCE: SIGNIFICANT CHANGE UP
UROBILINOGEN FLD QL: 0.2 E.U./DL — SIGNIFICANT CHANGE UP
WBC # BLD: 8 K/UL — SIGNIFICANT CHANGE UP (ref 3.8–10.5)
WBC # FLD AUTO: 8 K/UL — SIGNIFICANT CHANGE UP (ref 3.8–10.5)

## 2018-01-03 PROCEDURE — 71045 X-RAY EXAM CHEST 1 VIEW: CPT | Mod: 26

## 2018-01-03 PROCEDURE — 99233 SBSQ HOSP IP/OBS HIGH 50: CPT | Mod: GC

## 2018-01-03 RX ORDER — PIPERACILLIN AND TAZOBACTAM 4; .5 G/20ML; G/20ML
3.38 INJECTION, POWDER, LYOPHILIZED, FOR SOLUTION INTRAVENOUS EVERY 6 HOURS
Qty: 0 | Refills: 0 | Status: DISCONTINUED | OUTPATIENT
Start: 2018-01-03 | End: 2018-01-05

## 2018-01-03 RX ORDER — DIAZEPAM 5 MG
5 TABLET ORAL AT BEDTIME
Qty: 0 | Refills: 0 | Status: DISCONTINUED | OUTPATIENT
Start: 2018-01-03 | End: 2018-01-10

## 2018-01-03 RX ORDER — HYDROMORPHONE HYDROCHLORIDE 2 MG/ML
0.25 INJECTION INTRAMUSCULAR; INTRAVENOUS; SUBCUTANEOUS ONCE
Qty: 0 | Refills: 0 | Status: DISCONTINUED | OUTPATIENT
Start: 2018-01-03 | End: 2018-01-03

## 2018-01-03 RX ORDER — DIPHENHYDRAMINE HCL 50 MG
50 CAPSULE ORAL ONCE
Qty: 0 | Refills: 0 | Status: COMPLETED | OUTPATIENT
Start: 2018-01-03 | End: 2018-01-03

## 2018-01-03 RX ORDER — DIATRIZOATE MEGLUMINE 180 MG/ML
30 INJECTION, SOLUTION INTRAVESICAL ONCE
Qty: 0 | Refills: 0 | Status: COMPLETED | OUTPATIENT
Start: 2018-01-03 | End: 2018-01-03

## 2018-01-03 RX ORDER — ONDANSETRON 8 MG/1
8 TABLET, FILM COATED ORAL ONCE
Qty: 0 | Refills: 0 | Status: COMPLETED | OUTPATIENT
Start: 2018-01-03 | End: 2018-01-03

## 2018-01-03 RX ORDER — DEXTROSE 10 % IN WATER 10 %
1000 INTRAVENOUS SOLUTION INTRAVENOUS
Qty: 0 | Refills: 0 | Status: DISCONTINUED | OUTPATIENT
Start: 2018-01-03 | End: 2018-01-04

## 2018-01-03 RX ORDER — VANCOMYCIN HCL 1 G
750 VIAL (EA) INTRAVENOUS EVERY 12 HOURS
Qty: 0 | Refills: 0 | Status: DISCONTINUED | OUTPATIENT
Start: 2018-01-03 | End: 2018-01-05

## 2018-01-03 RX ORDER — ELECTROLYTE SOLUTION,INJ
1 VIAL (ML) INTRAVENOUS
Qty: 0 | Refills: 0 | Status: DISCONTINUED | OUTPATIENT
Start: 2018-01-03 | End: 2018-01-03

## 2018-01-03 RX ORDER — ACETAMINOPHEN 500 MG
1000 TABLET ORAL ONCE
Qty: 0 | Refills: 0 | Status: COMPLETED | OUTPATIENT
Start: 2018-01-03 | End: 2018-01-03

## 2018-01-03 RX ADMIN — Medication 50 MILLIGRAM(S): at 14:25

## 2018-01-03 RX ADMIN — Medication 325 MILLIGRAM(S): at 10:00

## 2018-01-03 RX ADMIN — Medication 2: at 17:37

## 2018-01-03 RX ADMIN — Medication 50 MILLIGRAM(S): at 08:46

## 2018-01-03 RX ADMIN — Medication 325 MILLIGRAM(S): at 04:21

## 2018-01-03 RX ADMIN — Medication 325 MILLIGRAM(S): at 20:47

## 2018-01-03 RX ADMIN — Medication 1000 MILLIGRAM(S): at 16:15

## 2018-01-03 RX ADMIN — HYDROMORPHONE HYDROCHLORIDE 0.25 MILLIGRAM(S): 2 INJECTION INTRAMUSCULAR; INTRAVENOUS; SUBCUTANEOUS at 10:01

## 2018-01-03 RX ADMIN — Medication 325 MILLIGRAM(S): at 21:47

## 2018-01-03 RX ADMIN — CALCITRIOL 1 MICROGRAM(S): 0.5 CAPSULE ORAL at 12:12

## 2018-01-03 RX ADMIN — Medication 325 MILLIGRAM(S): at 09:25

## 2018-01-03 RX ADMIN — GABAPENTIN 100 MILLIGRAM(S): 400 CAPSULE ORAL at 05:23

## 2018-01-03 RX ADMIN — HYDROMORPHONE HYDROCHLORIDE 0.25 MILLIGRAM(S): 2 INJECTION INTRAMUSCULAR; INTRAVENOUS; SUBCUTANEOUS at 22:00

## 2018-01-03 RX ADMIN — OCTREOTIDE ACETATE 450 MICROGRAM(S): 200 INJECTION, SOLUTION INTRAVENOUS; SUBCUTANEOUS at 12:12

## 2018-01-03 RX ADMIN — LOSARTAN POTASSIUM 50 MILLIGRAM(S): 100 TABLET, FILM COATED ORAL at 05:23

## 2018-01-03 RX ADMIN — PANTOPRAZOLE SODIUM 40 MILLIGRAM(S): 20 TABLET, DELAYED RELEASE ORAL at 05:23

## 2018-01-03 RX ADMIN — Medication 250 MILLIGRAM(S): at 20:46

## 2018-01-03 RX ADMIN — ENOXAPARIN SODIUM 40 MILLIGRAM(S): 100 INJECTION SUBCUTANEOUS at 05:23

## 2018-01-03 RX ADMIN — Medication 400 MILLIGRAM(S): at 16:02

## 2018-01-03 RX ADMIN — HYDROMORPHONE HYDROCHLORIDE 0.25 MILLIGRAM(S): 2 INJECTION INTRAMUSCULAR; INTRAVENOUS; SUBCUTANEOUS at 10:15

## 2018-01-03 RX ADMIN — Medication 10 MILLIGRAM(S): at 05:23

## 2018-01-03 RX ADMIN — Medication 5 MILLIGRAM(S): at 21:30

## 2018-01-03 RX ADMIN — DIATRIZOATE MEGLUMINE 30 MILLILITER(S): 180 INJECTION, SOLUTION INTRAVESICAL at 10:53

## 2018-01-03 RX ADMIN — Medication 325 MILLIGRAM(S): at 03:31

## 2018-01-03 RX ADMIN — PIPERACILLIN AND TAZOBACTAM 200 GRAM(S): 4; .5 INJECTION, POWDER, LYOPHILIZED, FOR SOLUTION INTRAVENOUS at 18:45

## 2018-01-03 RX ADMIN — Medication 1 EACH: at 17:59

## 2018-01-03 RX ADMIN — Medication 50 MILLIGRAM(S): at 03:31

## 2018-01-03 RX ADMIN — HYDROMORPHONE HYDROCHLORIDE 0.25 MILLIGRAM(S): 2 INJECTION INTRAMUSCULAR; INTRAVENOUS; SUBCUTANEOUS at 21:29

## 2018-01-03 RX ADMIN — ONDANSETRON 4 MILLIGRAM(S): 8 TABLET, FILM COATED ORAL at 05:23

## 2018-01-03 RX ADMIN — NYSTATIN CREAM 1 APPLICATION(S): 100000 CREAM TOPICAL at 05:22

## 2018-01-03 RX ADMIN — Medication 100 UNIT(S): at 22:15

## 2018-01-03 RX ADMIN — Medication 50 MILLIGRAM(S): at 20:46

## 2018-01-03 RX ADMIN — NYSTATIN CREAM 1 APPLICATION(S): 100000 CREAM TOPICAL at 17:59

## 2018-01-03 RX ADMIN — Medication 1 APPLICATION(S): at 12:15

## 2018-01-03 RX ADMIN — ENOXAPARIN SODIUM 40 MILLIGRAM(S): 100 INJECTION SUBCUTANEOUS at 17:37

## 2018-01-03 NOTE — PROGRESS NOTE ADULT - ATTENDING COMMENTS
Briefly, 58 yo female with gastric bypass followed by prolonged hospital stay, c/b EC fistula, recurrent MRSA BSI, CoNS BSI, polymicrobial abdominal infection (GNR, Enterococcus, presumed anaerobes given malodor). ID now reconsulted for new fevers since yesterday. Wound vac changed this AM with significant malodor. CVL (through which she was getting TPN) self-d/c'd yesterday, replaced today. No localizing symptoms. Abdominal wound with vac in place; no surrounding erythema. CVL c/d/i. f/u repeat bcx. Agree with CT abdomen/pelvis with contrast to evaluate for potential intra-abdominal source. Empiric vancomycin and zosyn as above.

## 2018-01-03 NOTE — PROGRESS NOTE ADULT - SUBJECTIVE AND OBJECTIVE BOX
had temp yesterday and central line removed  will have replaced as needs for tpn  will plan date for reconstruction with dr marmolejo

## 2018-01-03 NOTE — PROGRESS NOTE ADULT - ASSESSMENT
Recommend treating empirically with Vancomycin 750mg q12 and Zosyn 3.75g q8. Previous abdominal wound culture from 12/28 grew Enterococcus faecalis.  Will f/u CT abdomen and blood cultures. Recommend treating empirically with Vancomycin 750mg q12 and Zosyn 3.75g q6. Previous abdominal wound culture from 12/28 grew Enterococcus faecalis.  Will f/u CT abdomen and blood cultures.

## 2018-01-03 NOTE — PROGRESS NOTE ADULT - SUBJECTIVE AND OBJECTIVE BOX
HPI:  57F w/PMHx of HTN, gastric bypass in  c/b stricture, corkscrewed sleeve and chronic leak, revised on 16 with protracted (70-day) postoperative course complicated by MDR infections, a C-diff infection, respiratory compromise due to plugging/PNA/effusions requiring multiple interventions and a trach, as well as a continued leak requiring a draining double-pigtail with stenting performed by GI. She is now s/p  SBR, fistula resection, esophagojejunostomy revision w/ post-op course complicated by RTOR for distal anastomosis breakdown, ATN, acute respiratory failure, & septic shock, MRSA bacteremia which resolved. Currently for wound care management.     PAST MEDICAL & SURGICAL HISTORY:  Reflux  Obesity  DVT (deep venous thrombosis):  neck  Diverticulitis  Hypertension  Elective surgery: abodominal wall surgery  dec 2016  Elective surgery: 2016 gastric bypass revision  Gastric bypass status for obesity: gastric sleeve, 2012  S/P breast biopsy: , left  S/P colon resection:   S/P knee surgery: repair ,   right; left  Umbilical hernia:   S/P appendectomy:   S/P tonsillectomy:         REVIEW OF SYSTEMS:    General: no weakness; no fevers, no chills  Skin/Breast: no rash  Respiratory and Thorax: no SOB, no cough  Cardiovascular:	No chest pain  Gastrointestinal:	occasional nausea  Genitourinary:	no dysuria, no difficulty urinating, no hematuria  Musculoskeletal:	no weakness, no joint swelling/pain  Neurological:	no focal weakness/numbness  Endocrine:	no polyuria, no polydipsia      ANTIBIOTICS:  MEDICATIONS  (STANDING):  acetaminophen  IVPB. 1000 milliGRAM(s) IV Intermittent once  bismuth subsalicylate Liquid 30 milliLiter(s) Oral daily  calcitriol Injectable 1 MICROGram(s) IV Push <User Schedule>  collagenase Ointment 1 Application(s) Topical daily  cyanocobalamin Injectable 1000 MICROGram(s) SubCutaneous every 7 days  dextrose 10%. 1000 milliLiter(s) (50 mL/Hr) IV Continuous <Continuous>  dextrose 5%. 1000 milliLiter(s) (50 mL/Hr) IV Continuous <Continuous>  dextrose 50% Injectable 25 Gram(s) IV Push once  dextrose 50% Injectable 12.5 Gram(s) IV Push once  diazepam    Tablet 5 milliGRAM(s) Oral at bedtime  diphenhydrAMINE   Injectable 50 milliGRAM(s) IV Push every 6 hours  enoxaparin Injectable 40 milliGRAM(s) SubCutaneous two times a day  escitalopram 20 milliGRAM(s) Oral daily  gabapentin 100 milliGRAM(s) Oral two times a day  heparin  flush 100 Units/mL Injectable 100 Unit(s) IV Push every other day  heparin  flush 100 Units/mL Injectable 100 Unit(s) IV Push every other day  heparin  flush 100 Units/mL Injectable 100 Unit(s) IV Push every other day  insulin lispro (HumaLOG) corrective regimen sliding scale   SubCutaneous every 6 hours  lidocaine   Patch 1 Patch Transdermal daily  losartan 50 milliGRAM(s) Oral daily  nystatin Powder 1 Application(s) Topical two times a day  octreotide  Injectable 450 MICROGram(s) IV Push daily  ondansetron   Disintegrating Tablet 8 milliGRAM(s) Oral once  oxybutynin 10 milliGRAM(s) Oral two times a day  pantoprazole  Injectable 40 milliGRAM(s) IV Push daily  Parenteral Nutrition - Adult 1 Each (73 mL/Hr) TPN Continuous <Continuous>  Parenteral Nutrition - Adult 1 Each (73 mL/Hr) TPN Continuous <Continuous>  sodium chloride 0.9% lock flush 20 milliLiter(s) IV Push once    MEDICATIONS  (PRN):  acetaminophen   Tablet. 325 milliGRAM(s) Oral every 4 hours PRN Moderate Pain (4 - 6)  ondansetron Injectable 4 milliGRAM(s) IV Push every 6 hours PRN Nausea and/or Vomiting  sodium chloride 0.9% lock flush 10 milliLiter(s) IV Push every 1 hour PRN After each medication administration  sodium chloride 0.9% lock flush 10 milliLiter(s) IV Push every 12 hours PRN Lumen of catheter NOT used      Allergies    sulfa drugs (Unknown)  sulfamethoxazole (Other)    Intolerances        SOCIAL HISTORY:    FAMILY HISTORY:  No pertinent family history in first degree relatives      Vital Signs Last 24 Hrs  T(C): 38 (2018 09:00), Max: 38.8 (2018 21:07)  T(F): 100.4 (2018 09:00), Max: 101.9 (2018 21:07)  HR: 96 (2018 09:00) (83 - 105)  BP: 141/86 (2018 09:00) (117/78 - 166/89)  BP(mean): --  RR: 17 (2018 09:00) (16 - 19)  SpO2: 93% (2018 09:00) (93% - 100%)    18 @ 07:01  -  - @ 07:00  --------------------------------------------------------  IN: 1070.4 mL / OUT: 200 mL / NET: 870.4 mL        PHYSICAL EXAM:  Constitutional:Well-developed, well nourished  Eyes:CL, EOMI  Ear/Nose/Throat: no oral lesion, no sinus tenderness on percussion	  Neck:no JVD, no lymphadenopathy, supple  Respiratory: CTA bilaterally  Cardiovascular: S1S2 RRR, no murmurs  Gastrointestinal:soft, wound vac in place, no complaints of pain, no erythema  Extremities:no e/e/c  Vascular: DP Pulse: right normal; left normal            LABS:                        10.5   8.0   )-----------( 190      ( 2018 10:57 )             33.4     01-03    136  |  99  |  34<H>  ----------------------------<  117<H>  4.3   |  25  |  0.70    Ca    9.7      2018 07:15  Phos  3.7     -  Mg     2.1     -        Urinalysis Basic - ( 2018 00:24 )    Color: Yellow / Appearance: SL Cloudy / S.020 / pH: x  Gluc: x / Ketone: NEGATIVE  / Bili: Negative / Urobili: 0.2 E.U./dL   Blood: x / Protein: NEGATIVE mg/dL / Nitrite: POSITIVE   Leuk Esterase: NEGATIVE / RBC: < 5 /HPF / WBC < 5 /HPF   Sq Epi: x / Non Sq Epi: Moderate /HPF / Bacteria: Many /HPF        MICROBIOLOGY: reviewed  RADIOLOGY & ADDITIONAL STUDIES: reviewed

## 2018-01-03 NOTE — CHART NOTE - NSCHARTNOTEFT_GEN_A_CORE
Patient was refusing to go to CT, after a discussion with the patient she agreed to go.    At CT the patient could not tolerate the rectal contrast and she was demanding pain medication so the test could not be completed.    The patient will have to re-drink the PO contrast in the AM and go back to CT for scan. Prior to transport to CT the patient should receive a small dose of pain medication.

## 2018-01-03 NOTE — PROGRESS NOTE ADULT - SUBJECTIVE AND OBJECTIVE BOX
O/N: HR elevated to low 100s, Tmax 101.9, blood cx sent, central line pulled accidentally by pt, CXR pending, PIV placed  1/2: temp 101.4 , pan cx , CXR unchanged O/N: HR elevated to low 100s, Tmax 101.9, blood cx sent, central line pulled accidentally by pt, CXR pending, PIV placed  : temp 101.4 , pan cx , CXR unchanged    INTERVAL HPI/OVERNIGHT EVENTS:    SUBJECTIVE: Pt seen and examined at bed side. felling better since OOB ambulate yesterday/   Flatus: [x ] YES [ ] NO             Bowel Movement: [x ] YES [ ] NO  Pain (0-10):            Pain Control Adequate: [ ] YES [x ] NO  Nausea: [ ] YES [x ] NO            Vomiting: [ ] YES [x ] NO  Diarrhea: [ ] YES [x ] NO         Constipation: [ ] YES [x ] NO     Chest Pain: [ ] YES [x ] NO    SOB:  [ ] YES [x ] NO    MEDICATIONS  (STANDING):  acetaminophen  IVPB. 1000 milliGRAM(s) IV Intermittent once  bismuth subsalicylate Liquid 30 milliLiter(s) Oral daily  calcitriol Injectable 1 MICROGram(s) IV Push <User Schedule>  collagenase Ointment 1 Application(s) Topical daily  cyanocobalamin Injectable 1000 MICROGram(s) SubCutaneous every 7 days  dextrose 10%. 1000 milliLiter(s) (50 mL/Hr) IV Continuous <Continuous>  dextrose 5%. 1000 milliLiter(s) (50 mL/Hr) IV Continuous <Continuous>  dextrose 50% Injectable 25 Gram(s) IV Push once  dextrose 50% Injectable 12.5 Gram(s) IV Push once  diazepam    Tablet 5 milliGRAM(s) Oral at bedtime  diphenhydrAMINE   Injectable 50 milliGRAM(s) IV Push every 6 hours  enoxaparin Injectable 40 milliGRAM(s) SubCutaneous two times a day  escitalopram 20 milliGRAM(s) Oral daily  gabapentin 100 milliGRAM(s) Oral two times a day  heparin  flush 100 Units/mL Injectable 100 Unit(s) IV Push every other day  heparin  flush 100 Units/mL Injectable 100 Unit(s) IV Push every other day  insulin lispro (HumaLOG) corrective regimen sliding scale   SubCutaneous every 6 hours  lidocaine   Patch 1 Patch Transdermal daily  losartan 50 milliGRAM(s) Oral daily  nystatin Powder 1 Application(s) Topical two times a day  octreotide  Injectable 450 MICROGram(s) IV Push daily  ondansetron   Disintegrating Tablet 8 milliGRAM(s) Oral once  oxybutynin 10 milliGRAM(s) Oral two times a day  pantoprazole  Injectable 40 milliGRAM(s) IV Push daily  Parenteral Nutrition - Adult 1 Each (73 mL/Hr) TPN Continuous <Continuous>  Parenteral Nutrition - Adult 1 Each (73 mL/Hr) TPN Continuous <Continuous>  sodium chloride 0.9% lock flush 20 milliLiter(s) IV Push once    MEDICATIONS  (PRN):  acetaminophen   Tablet. 325 milliGRAM(s) Oral every 4 hours PRN Moderate Pain (4 - 6)  ondansetron Injectable 4 milliGRAM(s) IV Push every 6 hours PRN Nausea and/or Vomiting  sodium chloride 0.9% lock flush 10 milliLiter(s) IV Push every 1 hour PRN After each medication administration  sodium chloride 0.9% lock flush 10 milliLiter(s) IV Push every 12 hours PRN Lumen of catheter NOT used      Vital Signs Last 24 Hrs  T(C): 38 (2018 09:00), Max: 38.8 (2018 21:07)  T(F): 100.4 (2018 09:00), Max: 101.9 (2018 21:07)  HR: 96 (2018 09:00) (83 - 105)  BP: 141/86 (2018 09:00) (117/78 - 166/89)  BP(mean): --  RR: 17 (2018 09:00) (16 - 19)  SpO2: 93% (2018 09:00) (93% - 100%)    PHYSICAL EXAM:      Constitutional: A&Ox3    Respiratory: non labored breathing, no respiratory distress    Cardiovascular: RRR    Gastrointestinal: Vac in place.     Genitourinary: Making urine.     Extremities: (-) edema                  I&O's Detail    2018 07:01  -  2018 07:00  --------------------------------------------------------  IN:    dextrose 10%.: 100 mL    Fat Emulsion 20%: 166.4 mL    Oral Fluid: 120 mL    Solution: 100 mL    TPN (Total Parenteral Nutrition): 584 mL  Total IN: 1070.4 mL    OUT:    Drain: 200 mL  Total OUT: 200 mL    Total NET: 870.4 mL          LABS:                        10.5   8.0   )-----------( 190      ( 2018 10:57 )             33.4     -    136  |  99  |  34<H>  ----------------------------<  117<H>  4.3   |  25  |  0.70    Ca    9.7      2018 07:15  Phos  3.7       Mg     2.1         TPro  8.7<H>  /  Alb  3.8  /  TBili  0.6  /  DBili  x   /  AST  38  /  ALT  62<H>  /  AlkPhos  306<H>        Urinalysis Basic - ( 2018 00:24 )    Color: Yellow / Appearance: SL Cloudy / S.020 / pH: x  Gluc: x / Ketone: NEGATIVE  / Bili: Negative / Urobili: 0.2 E.U./dL   Blood: x / Protein: NEGATIVE mg/dL / Nitrite: POSITIVE   Leuk Esterase: NEGATIVE / RBC: < 5 /HPF / WBC < 5 /HPF   Sq Epi: x / Non Sq Epi: Moderate /HPF / Bacteria: Many /HPF        RADIOLOGY & ADDITIONAL STUDIES:

## 2018-01-03 NOTE — CONSULT NOTE ADULT - SUBJECTIVE AND OBJECTIVE BOX
HPI:  57F w/PMHx of HTN, gastric bypass in  c/b stricture, corkscrewed sleeve and chronic leak, revised on 16 with protracted (70-day) postoperative course complicated by MDR infections, a C-diff infection, respiratory compromise due to plugging/PNA/effusions requiring multiple interventions and a trach, as well as a continued leak requiring a draining double-pigtail with stenting performed by GI. She is now s/p  SBR, fistula resection, esophagojejunostomy revision w/ post-op course complicated by RTOR for distal anastomosis breakdown, ATN, acute respiratory failure, & septic shock, MRSA bacteremia which resolved. Currently for wound care management.     PAST MEDICAL & SURGICAL HISTORY:  Reflux  Obesity  DVT (deep venous thrombosis):  neck  Diverticulitis  Hypertension  Elective surgery: abodominal wall surgery  dec 2016  Elective surgery: 2016 gastric bypass revision  Gastric bypass status for obesity: gastric sleeve, 2012  S/P breast biopsy: , left  S/P colon resection:   S/P knee surgery: repair ,   right; left  Umbilical hernia:   S/P appendectomy:   S/P tonsillectomy:         REVIEW OF SYSTEMS:    General:	 no weakness; no fevers, no chills  Skin/Breast: no rash  Respiratory and Thorax: no SOB, no cough  Cardiovascular:	No chest pain  Gastrointestinal:	occasional nausea  Genitourinary:	no dysuria, no difficulty urinating, no hematuria  Musculoskeletal:	no weakness, no joint swelling/pain  Neurological:	no focal weakness/numbness  Endocrine:	no polyuria, no polydipsia      ANTIBIOTICS:  MEDICATIONS  (STANDING):  acetaminophen  IVPB. 1000 milliGRAM(s) IV Intermittent once  bismuth subsalicylate Liquid 30 milliLiter(s) Oral daily  calcitriol Injectable 1 MICROGram(s) IV Push <User Schedule>  collagenase Ointment 1 Application(s) Topical daily  cyanocobalamin Injectable 1000 MICROGram(s) SubCutaneous every 7 days  dextrose 10%. 1000 milliLiter(s) (50 mL/Hr) IV Continuous <Continuous>  dextrose 5%. 1000 milliLiter(s) (50 mL/Hr) IV Continuous <Continuous>  dextrose 50% Injectable 25 Gram(s) IV Push once  dextrose 50% Injectable 12.5 Gram(s) IV Push once  diazepam    Tablet 5 milliGRAM(s) Oral at bedtime  diphenhydrAMINE   Injectable 50 milliGRAM(s) IV Push every 6 hours  enoxaparin Injectable 40 milliGRAM(s) SubCutaneous two times a day  escitalopram 20 milliGRAM(s) Oral daily  gabapentin 100 milliGRAM(s) Oral two times a day  heparin  flush 100 Units/mL Injectable 100 Unit(s) IV Push every other day  heparin  flush 100 Units/mL Injectable 100 Unit(s) IV Push every other day  heparin  flush 100 Units/mL Injectable 100 Unit(s) IV Push every other day  insulin lispro (HumaLOG) corrective regimen sliding scale   SubCutaneous every 6 hours  lidocaine   Patch 1 Patch Transdermal daily  losartan 50 milliGRAM(s) Oral daily  nystatin Powder 1 Application(s) Topical two times a day  octreotide  Injectable 450 MICROGram(s) IV Push daily  ondansetron   Disintegrating Tablet 8 milliGRAM(s) Oral once  oxybutynin 10 milliGRAM(s) Oral two times a day  pantoprazole  Injectable 40 milliGRAM(s) IV Push daily  Parenteral Nutrition - Adult 1 Each (73 mL/Hr) TPN Continuous <Continuous>  Parenteral Nutrition - Adult 1 Each (73 mL/Hr) TPN Continuous <Continuous>  sodium chloride 0.9% lock flush 20 milliLiter(s) IV Push once    MEDICATIONS  (PRN):  acetaminophen   Tablet. 325 milliGRAM(s) Oral every 4 hours PRN Moderate Pain (4 - 6)  ondansetron Injectable 4 milliGRAM(s) IV Push every 6 hours PRN Nausea and/or Vomiting  sodium chloride 0.9% lock flush 10 milliLiter(s) IV Push every 1 hour PRN After each medication administration  sodium chloride 0.9% lock flush 10 milliLiter(s) IV Push every 12 hours PRN Lumen of catheter NOT used      Allergies    sulfa drugs (Unknown)  sulfamethoxazole (Other)    Intolerances        SOCIAL HISTORY:    FAMILY HISTORY:  No pertinent family history in first degree relatives      Vital Signs Last 24 Hrs  T(C): 38 (2018 09:00), Max: 38.8 (2018 21:07)  T(F): 100.4 (2018 09:00), Max: 101.9 (2018 21:07)  HR: 96 (2018 09:00) (83 - 105)  BP: 141/86 (2018 09:00) (117/78 - 166/89)  BP(mean): --  RR: 17 (2018 09:00) (16 - 19)  SpO2: 93% (2018 09:00) (93% - 100%)    18 @ 07:01  -  18 @ 07:00  --------------------------------------------------------  IN: 1070.4 mL / OUT: 200 mL / NET: 870.4 mL        PHYSICAL EXAM:  Constitutional:Well-developed, well nourished  Eyes:CL, EOMI  Ear/Nose/Throat: no oral lesion, no sinus tenderness on percussion	  Neck:no JVD, no lymphadenopathy, supple  Respiratory: CTA eileen  Cardiovascular: S1S2 RRR, no murmurs  Gastrointestinal:soft, wound vac in place, no complaints of pain, no erythema  Extremities:no e/e/c  Vascular: DP Pulse: right normal; left normal            LABS:                        10.5   8.0   )-----------( 190      ( 2018 10:57 )             33.4     01-03    136  |  99  |  34<H>  ----------------------------<  117<H>  4.3   |  25  |  0.70    Ca    9.7      2018 07:15  Phos  3.7     -  Mg     2.1     -        Urinalysis Basic - ( 2018 00:24 )    Color: Yellow / Appearance: SL Cloudy / S.020 / pH: x  Gluc: x / Ketone: NEGATIVE  / Bili: Negative / Urobili: 0.2 E.U./dL   Blood: x / Protein: NEGATIVE mg/dL / Nitrite: POSITIVE   Leuk Esterase: NEGATIVE / RBC: < 5 /HPF / WBC < 5 /HPF   Sq Epi: x / Non Sq Epi: Moderate /HPF / Bacteria: Many /HPF        MICROBIOLOGY:  RADIOLOGY & ADDITIONAL STUDIES:

## 2018-01-03 NOTE — PROGRESS NOTE ADULT - ASSESSMENT
57 F s/p  SBR, fistula resection, esophagojejunostomy revision w/ post-op course complicated by RTOR for distal anastomosis breakdown, ATN, acute respiratory failure, & septic shock, MRSA bacteremia which resolved. Currently for wound care management.     NPO (sips and chips)/TPN   Pain/nausea control - only benadryl at midnight and dilaudid 0.25mg during PT session   IS/OOB with PT daily  ISS  ID abx course complete: (Vanco 12/1 - 12/25), Nystatin powder  Benadryl for abdominal pruritus  SCDs, lovenox (therapeutic), OOBA  Lines :  L left subclavian line (10/4-12/2), L IJ (12/2-1/3)  AM labs, weekly albumin, pre-albumin and triglyceride (every friday).  Wet to dry dressing in place  Colostomy w/ suction 57 F s/p  SBR, fistula resection, esophagojejunostomy revision w/ post-op course complicated by RTOR for distal anastomosis breakdown, ATN, acute respiratory failure, & septic shock, MRSA bacteremia which resolved. Currently for wound care management.     NPO (sips and chips)/TPN   Pain/nausea control - only benadryl at midnight and dilaudid 0.25mg during PT session   IS/OOB with PT daily  ISS  ID F/U blood Ctx. F/U CXR, CT, Consult ID abx course complete: (Vanco 12/1 - 12/25), Nystatin powder  Benadryl for abdominal pruritus  SCDs, lovenox (therapeutic), OOBA  Lines :  L left subclavian line (10/4-12/2), L IJ (12/2-1/3) Call [facility] to replace.   AM labs, weekly albumin, pre-albumin and triglyceride (every friday).  Wet to dry dressing in place  Colostomy w/ suction

## 2018-01-04 LAB
ANION GAP SERPL CALC-SCNC: 9 MMOL/L — SIGNIFICANT CHANGE UP (ref 5–17)
BASOPHILS NFR BLD AUTO: 0.2 % — SIGNIFICANT CHANGE UP (ref 0–2)
BUN SERPL-MCNC: 29 MG/DL — HIGH (ref 7–23)
CALCIUM SERPL-MCNC: 9.3 MG/DL — SIGNIFICANT CHANGE UP (ref 8.4–10.5)
CHLORIDE SERPL-SCNC: 98 MMOL/L — SIGNIFICANT CHANGE UP (ref 96–108)
CO2 SERPL-SCNC: 28 MMOL/L — SIGNIFICANT CHANGE UP (ref 22–31)
CREAT SERPL-MCNC: 0.73 MG/DL — SIGNIFICANT CHANGE UP (ref 0.5–1.3)
EOSINOPHIL NFR BLD AUTO: 3.4 % — SIGNIFICANT CHANGE UP (ref 0–6)
GLUCOSE BLDC GLUCOMTR-MCNC: 119 MG/DL — HIGH (ref 70–99)
GLUCOSE BLDC GLUCOMTR-MCNC: 142 MG/DL — HIGH (ref 70–99)
GLUCOSE BLDC GLUCOMTR-MCNC: 158 MG/DL — HIGH (ref 70–99)
GLUCOSE BLDC GLUCOMTR-MCNC: 82 MG/DL — SIGNIFICANT CHANGE UP (ref 70–99)
GLUCOSE SERPL-MCNC: 153 MG/DL — HIGH (ref 70–99)
HCT VFR BLD CALC: 33.1 % — LOW (ref 34.5–45)
HGB BLD-MCNC: 10 G/DL — LOW (ref 11.5–15.5)
LYMPHOCYTES # BLD AUTO: 16.4 % — SIGNIFICANT CHANGE UP (ref 13–44)
MCHC RBC-ENTMCNC: 26.3 PG — LOW (ref 27–34)
MCHC RBC-ENTMCNC: 30.2 G/DL — LOW (ref 32–36)
MCV RBC AUTO: 87.1 FL — SIGNIFICANT CHANGE UP (ref 80–100)
MONOCYTES NFR BLD AUTO: 8.8 % — SIGNIFICANT CHANGE UP (ref 2–14)
NEUTROPHILS NFR BLD AUTO: 71.2 % — SIGNIFICANT CHANGE UP (ref 43–77)
PLATELET # BLD AUTO: 163 K/UL — SIGNIFICANT CHANGE UP (ref 150–400)
POTASSIUM SERPL-MCNC: 4 MMOL/L — SIGNIFICANT CHANGE UP (ref 3.5–5.3)
POTASSIUM SERPL-SCNC: 4 MMOL/L — SIGNIFICANT CHANGE UP (ref 3.5–5.3)
RBC # BLD: 3.8 M/UL — SIGNIFICANT CHANGE UP (ref 3.8–5.2)
RBC # FLD: 16.3 % — SIGNIFICANT CHANGE UP (ref 10.3–16.9)
SODIUM SERPL-SCNC: 135 MMOL/L — SIGNIFICANT CHANGE UP (ref 135–145)
WBC # BLD: 4.8 K/UL — SIGNIFICANT CHANGE UP (ref 3.8–10.5)
WBC # FLD AUTO: 4.8 K/UL — SIGNIFICANT CHANGE UP (ref 3.8–10.5)

## 2018-01-04 PROCEDURE — 74177 CT ABD & PELVIS W/CONTRAST: CPT | Mod: 26

## 2018-01-04 PROCEDURE — 99232 SBSQ HOSP IP/OBS MODERATE 35: CPT | Mod: GC

## 2018-01-04 RX ORDER — DIATRIZOATE MEGLUMINE 180 MG/ML
30 INJECTION, SOLUTION INTRAVESICAL ONCE
Qty: 0 | Refills: 0 | Status: COMPLETED | OUTPATIENT
Start: 2018-01-04 | End: 2018-01-04

## 2018-01-04 RX ORDER — HYDROMORPHONE HYDROCHLORIDE 2 MG/ML
0.5 INJECTION INTRAMUSCULAR; INTRAVENOUS; SUBCUTANEOUS ONCE
Qty: 0 | Refills: 0 | Status: DISCONTINUED | OUTPATIENT
Start: 2018-01-04 | End: 2018-01-04

## 2018-01-04 RX ORDER — I.V. FAT EMULSION 20 G/100ML
0.5 EMULSION INTRAVENOUS
Qty: 50.05 | Refills: 0 | Status: DISCONTINUED | OUTPATIENT
Start: 2018-01-04 | End: 2018-01-04

## 2018-01-04 RX ORDER — ACETAMINOPHEN 500 MG
1000 TABLET ORAL ONCE
Qty: 0 | Refills: 0 | Status: COMPLETED | OUTPATIENT
Start: 2018-01-04 | End: 2018-01-04

## 2018-01-04 RX ORDER — ELECTROLYTE SOLUTION,INJ
1 VIAL (ML) INTRAVENOUS
Qty: 0 | Refills: 0 | Status: DISCONTINUED | OUTPATIENT
Start: 2018-01-04 | End: 2018-01-04

## 2018-01-04 RX ADMIN — Medication 50 MILLIGRAM(S): at 08:26

## 2018-01-04 RX ADMIN — Medication 250 MILLIGRAM(S): at 08:26

## 2018-01-04 RX ADMIN — OCTREOTIDE ACETATE 400 MICROGRAM(S): 200 INJECTION, SOLUTION INTRAVENOUS; SUBCUTANEOUS at 11:42

## 2018-01-04 RX ADMIN — Medication 325 MILLIGRAM(S): at 23:23

## 2018-01-04 RX ADMIN — PIPERACILLIN AND TAZOBACTAM 200 GRAM(S): 4; .5 INJECTION, POWDER, LYOPHILIZED, FOR SOLUTION INTRAVENOUS at 11:43

## 2018-01-04 RX ADMIN — I.V. FAT EMULSION 20.85 GM/KG/DAY: 20 EMULSION INTRAVENOUS at 21:26

## 2018-01-04 RX ADMIN — PREGABALIN 1000 MICROGRAM(S): 225 CAPSULE ORAL at 12:01

## 2018-01-04 RX ADMIN — PIPERACILLIN AND TAZOBACTAM 200 GRAM(S): 4; .5 INJECTION, POWDER, LYOPHILIZED, FOR SOLUTION INTRAVENOUS at 05:30

## 2018-01-04 RX ADMIN — SODIUM CHLORIDE 10 MILLILITER(S): 9 INJECTION INTRAMUSCULAR; INTRAVENOUS; SUBCUTANEOUS at 00:19

## 2018-01-04 RX ADMIN — ONDANSETRON 4 MILLIGRAM(S): 8 TABLET, FILM COATED ORAL at 22:05

## 2018-01-04 RX ADMIN — Medication 325 MILLIGRAM(S): at 19:03

## 2018-01-04 RX ADMIN — ENOXAPARIN SODIUM 40 MILLIGRAM(S): 100 INJECTION SUBCUTANEOUS at 05:30

## 2018-01-04 RX ADMIN — Medication 2: at 00:09

## 2018-01-04 RX ADMIN — Medication 100 UNIT(S): at 11:43

## 2018-01-04 RX ADMIN — Medication 50 MILLIGRAM(S): at 13:04

## 2018-01-04 RX ADMIN — Medication 325 MILLIGRAM(S): at 11:41

## 2018-01-04 RX ADMIN — ONDANSETRON 4 MILLIGRAM(S): 8 TABLET, FILM COATED ORAL at 11:43

## 2018-01-04 RX ADMIN — PIPERACILLIN AND TAZOBACTAM 200 GRAM(S): 4; .5 INJECTION, POWDER, LYOPHILIZED, FOR SOLUTION INTRAVENOUS at 00:10

## 2018-01-04 RX ADMIN — Medication 50 MILLIGRAM(S): at 23:24

## 2018-01-04 RX ADMIN — NYSTATIN CREAM 1 APPLICATION(S): 100000 CREAM TOPICAL at 05:31

## 2018-01-04 RX ADMIN — Medication 1 APPLICATION(S): at 12:02

## 2018-01-04 RX ADMIN — DIATRIZOATE MEGLUMINE 30 MILLILITER(S): 180 INJECTION, SOLUTION INTRAVESICAL at 08:54

## 2018-01-04 RX ADMIN — PANTOPRAZOLE SODIUM 40 MILLIGRAM(S): 20 TABLET, DELAYED RELEASE ORAL at 05:33

## 2018-01-04 RX ADMIN — HYDROMORPHONE HYDROCHLORIDE 0.5 MILLIGRAM(S): 2 INJECTION INTRAMUSCULAR; INTRAVENOUS; SUBCUTANEOUS at 14:58

## 2018-01-04 RX ADMIN — LOSARTAN POTASSIUM 50 MILLIGRAM(S): 100 TABLET, FILM COATED ORAL at 05:30

## 2018-01-04 RX ADMIN — Medication 1000 MILLIGRAM(S): at 02:06

## 2018-01-04 RX ADMIN — Medication 10 MILLIGRAM(S): at 18:19

## 2018-01-04 RX ADMIN — Medication 325 MILLIGRAM(S): at 12:58

## 2018-01-04 RX ADMIN — Medication 325 MILLIGRAM(S): at 05:46

## 2018-01-04 RX ADMIN — SODIUM CHLORIDE 10 MILLILITER(S): 9 INJECTION INTRAMUSCULAR; INTRAVENOUS; SUBCUTANEOUS at 12:00

## 2018-01-04 RX ADMIN — ESCITALOPRAM OXALATE 20 MILLIGRAM(S): 10 TABLET, FILM COATED ORAL at 21:21

## 2018-01-04 RX ADMIN — GABAPENTIN 100 MILLIGRAM(S): 400 CAPSULE ORAL at 05:30

## 2018-01-04 RX ADMIN — Medication 325 MILLIGRAM(S): at 18:19

## 2018-01-04 RX ADMIN — Medication 325 MILLIGRAM(S): at 06:46

## 2018-01-04 RX ADMIN — ENOXAPARIN SODIUM 40 MILLIGRAM(S): 100 INJECTION SUBCUTANEOUS at 18:18

## 2018-01-04 RX ADMIN — PIPERACILLIN AND TAZOBACTAM 200 GRAM(S): 4; .5 INJECTION, POWDER, LYOPHILIZED, FOR SOLUTION INTRAVENOUS at 18:19

## 2018-01-04 RX ADMIN — PIPERACILLIN AND TAZOBACTAM 200 GRAM(S): 4; .5 INJECTION, POWDER, LYOPHILIZED, FOR SOLUTION INTRAVENOUS at 23:24

## 2018-01-04 RX ADMIN — Medication 50 MILLIGRAM(S): at 02:00

## 2018-01-04 RX ADMIN — Medication 400 MILLIGRAM(S): at 01:36

## 2018-01-04 RX ADMIN — Medication 73 EACH: at 19:43

## 2018-01-04 RX ADMIN — GABAPENTIN 100 MILLIGRAM(S): 400 CAPSULE ORAL at 18:18

## 2018-01-04 RX ADMIN — Medication 5 MILLIGRAM(S): at 21:21

## 2018-01-04 RX ADMIN — Medication 10 MILLIGRAM(S): at 05:30

## 2018-01-04 RX ADMIN — Medication 50 MILLIGRAM(S): at 18:18

## 2018-01-04 RX ADMIN — NYSTATIN CREAM 1 APPLICATION(S): 100000 CREAM TOPICAL at 18:19

## 2018-01-04 RX ADMIN — HYDROMORPHONE HYDROCHLORIDE 0.5 MILLIGRAM(S): 2 INJECTION INTRAMUSCULAR; INTRAVENOUS; SUBCUTANEOUS at 14:03

## 2018-01-04 RX ADMIN — Medication 250 MILLIGRAM(S): at 19:03

## 2018-01-04 NOTE — PROGRESS NOTE ADULT - ASSESSMENT
58 yo female with gastric bypass followed by prolonged hospital stay, c/b EC fistula, recurrent MRSA BSI, CoNS BSI, polymicrobial abdominal infection (GNR, Enterococcus, presumed anaerobes given malodor) with wound vac in place on abdomen    Recommendations:  -Continue Vancomycin 750mg q12  -Continue Zosyn 3.375g q6  -No growth in blood cultures sent yesterday at 1 day  -Will f/u results of CT abdomen/pelvis w/ contrast once completed to evaluate for potential intra-abdominal source

## 2018-01-04 NOTE — PROGRESS NOTE ADULT - SUBJECTIVE AND OBJECTIVE BOX
O/N: CT cound not be done at patient could not tolerate rectal contrast, will reorder oral contrast for AM, dressing reinforced, temp 101 tylenol given, cultured today: blood cx: NGTD. remainimg VSS. BRAYDEN.  1/3: Grant placed a line , Ct abdomen /pelvis ordered with PO/IV /RECTAL contrast , START Vanco and Zosyn for empiric treatment O/N: CT cound not be done at patient could not tolerate rectal contrast, will reorder oral contrast for AM, dressing reinforced, temp 101 tylenol given, cultured today: blood cx: NGTD. remainimg VSS. BRAYDEN.  1/3: Grant placed a line , Ct abdomen /pelvis ordered with PO/IV /RECTAL contrast , START Vanco and Zosyn for empiric treatment    SUBJECTIVE: Patient denies any complaints   Flatus: [X] YES [] NO             Bowel Movement: [ X] YES [ ] NO  Pain (0-10):            Pain Control Adequate: [ ] YES [ X] NO  Nausea: [ ] YES [X ] NO            Vomiting: [ ] YES [ X] NO  Diarrhea: [ ] YES [X ] NO         Constipation: [ ] YES [X ] NO     Chest Pain: [ ] YES [ X] NO    SOB:  [ ] YES [X ] NO    enoxaparin Injectable 40 milliGRAM(s) SubCutaneous two times a day  heparin  flush 100 Units/mL Injectable 100 Unit(s) IV Push every other day  heparin  flush 100 Units/mL Injectable 100 Unit(s) IV Push every other day  heparin  flush 100 Units/mL Injectable 100 Unit(s) IV Push every other day  losartan 50 milliGRAM(s) Oral daily  piperacillin/tazobactam IVPB. 3.375 Gram(s) IV Intermittent every 6 hours  vancomycin  IVPB 750 milliGRAM(s) IV Intermittent every 12 hours      Vital Signs Last 24 Hrs  T(C): 37.1 (2018 05:36), Max: 38.3 (2018 00:36)  T(F): 98.8 (2018 05:36), Max: 101 (2018 00:36)  HR: 87 (2018 05:36) (80 - 96)  BP: 135/87 (2018 05:36) (107/68 - 141/86)  BP(mean): --  RR: 17 (2018 05:36) (17 - 17)  SpO2: 96% (2018 05:36) (93% - 96%)  I&O's Detail    2018 07:01  -  2018 07:00  --------------------------------------------------------  IN:    dextrose 10%.: 300 mL    Solution: 100 mL    Solution: 250 mL    Solution: 200 mL    TPN (Total Parenteral Nutrition): 1095 mL  Total IN: 1945 mL    OUT:    Drain: 200 mL  Total OUT: 200 mL    Total NET: 1745 mL          General: NAD, resting comfortably in bed  C/V: NSR  Pulm: Nonlabored breathing, no respiratory distress  Abd: soft, NT/ND. granulating tissue around fistula site   Extrem: WWP, no edema, SCDs in place        LABS:                        10.5   8.0   )-----------( 190      ( 2018 10:57 )             33.4     01-03    136  |  99  |  34<H>  ----------------------------<  117<H>  4.3   |  25  |  0.70    Ca    9.7      2018 07:15  Phos  3.7       Mg     2.1             Urinalysis Basic - ( 2018 00:24 )    Color: Yellow / Appearance: SL Cloudy / S.020 / pH: x  Gluc: x / Ketone: NEGATIVE  / Bili: Negative / Urobili: 0.2 E.U./dL   Blood: x / Protein: NEGATIVE mg/dL / Nitrite: POSITIVE   Leuk Esterase: NEGATIVE / RBC: < 5 /HPF / WBC < 5 /HPF   Sq Epi: x / Non Sq Epi: Moderate /HPF / Bacteria: Many /HPF

## 2018-01-04 NOTE — CHART NOTE - NSCHARTNOTEFT_GEN_A_CORE
Admitting Diagnosis:   Patient is a 57y old  Female who presents with a chief complaint of enterocutaneous fistula (24 Jul 2017 14:15)      PAST MEDICAL & SURGICAL HISTORY:  Reflux  Obesity  DVT (deep venous thrombosis): 2013 neck  Diverticulitis  Hypertension  Elective surgery: abodominal wall surgery  dec 2016  Elective surgery: NOV 2016 gastric bypass revision  Gastric bypass status for obesity: gastric sleeve, 6/2012  S/P breast biopsy: 2011, left  S/P colon resection: 2011  S/P knee surgery: repair 2009, 2011  right; left  Umbilical hernia: 2000  S/P appendectomy: 1975  S/P tonsillectomy: 1967      Current Nutrition Order:    PO Intake: Good (%) [   ]  Fair (50-75%) [   ] Poor (<25%) [   ]    GI Issues:     Pain:    Skin Integrity:    Labs:   01-04    135  |  98  |  29<H>  ----------------------------<  153<H>  4.0   |  28  |  0.73    Ca    9.3      04 Jan 2018 08:42  Phos  3.7     01-03  Mg     2.1     01-03      CAPILLARY BLOOD GLUCOSE      POCT Blood Glucose.: 119 mg/dL (04 Jan 2018 11:32)  POCT Blood Glucose.: 142 mg/dL (04 Jan 2018 05:28)  POCT Blood Glucose.: 158 mg/dL (04 Jan 2018 00:00)  POCT Blood Glucose.: 200 mg/dL (03 Jan 2018 17:06)      Medications:  MEDICATIONS  (STANDING):  bismuth subsalicylate Liquid 30 milliLiter(s) Oral daily  calcitriol Injectable 1 MICROGram(s) IV Push <User Schedule>  collagenase Ointment 1 Application(s) Topical daily  cyanocobalamin Injectable 1000 MICROGram(s) SubCutaneous every 7 days  dextrose 10%. 1000 milliLiter(s) (50 mL/Hr) IV Continuous <Continuous>  dextrose 5%. 1000 milliLiter(s) (50 mL/Hr) IV Continuous <Continuous>  dextrose 50% Injectable 25 Gram(s) IV Push once  dextrose 50% Injectable 12.5 Gram(s) IV Push once  diazepam    Tablet 5 milliGRAM(s) Oral at bedtime  diphenhydrAMINE   Injectable 50 milliGRAM(s) IV Push every 6 hours  enoxaparin Injectable 40 milliGRAM(s) SubCutaneous two times a day  escitalopram 20 milliGRAM(s) Oral daily  fat emulsion (Plant Based) 20% Infusion 0.503 Gm/kG/Day (20.853 mL/Hr) IV Continuous <Continuous>  gabapentin 100 milliGRAM(s) Oral two times a day  heparin  flush 100 Units/mL Injectable 100 Unit(s) IV Push every other day  heparin  flush 100 Units/mL Injectable 100 Unit(s) IV Push every other day  heparin  flush 100 Units/mL Injectable 100 Unit(s) IV Push every other day  HYDROmorphone  Injectable 0.5 milliGRAM(s) IV Push once  insulin lispro (HumaLOG) corrective regimen sliding scale   SubCutaneous every 6 hours  lidocaine   Patch 1 Patch Transdermal daily  losartan 50 milliGRAM(s) Oral daily  nystatin Powder 1 Application(s) Topical two times a day  octreotide  Injectable 450 MICROGram(s) IV Push daily  ondansetron   Disintegrating Tablet 8 milliGRAM(s) Oral once  oxybutynin 10 milliGRAM(s) Oral two times a day  pantoprazole  Injectable 40 milliGRAM(s) IV Push daily  Parenteral Nutrition - Adult 1 Each (73 mL/Hr) TPN Continuous <Continuous>  Parenteral Nutrition - Adult 1 Each (73 mL/Hr) TPN Continuous <Continuous>  piperacillin/tazobactam IVPB. 3.375 Gram(s) IV Intermittent every 6 hours  sodium chloride 0.9% lock flush 20 milliLiter(s) IV Push once  vancomycin  IVPB 750 milliGRAM(s) IV Intermittent every 12 hours    MEDICATIONS  (PRN):  acetaminophen   Tablet. 325 milliGRAM(s) Oral every 4 hours PRN Moderate Pain (4 - 6)  ondansetron Injectable 4 milliGRAM(s) IV Push every 6 hours PRN Nausea and/or Vomiting  sodium chloride 0.9% lock flush 10 milliLiter(s) IV Push every 1 hour PRN After each medication administration  sodium chloride 0.9% lock flush 10 milliLiter(s) IV Push every 12 hours PRN Lumen of catheter NOT used      Weight:  172.5 (12/19)  171.5lb (12/14)  167lb (11/13)  164lb (10/17)  219lb (8/1)    Weight Change:   03/2016: 89kg  02/2017: 74kg   07/2017: 76kg  11/2017: 76kg   Pt's most recent wt is 172.5lbs per standing scale --indicating a 5.5lb wt gain since last month. Pt stated she would get on scale during PT- discussed plan w/ RN.    Estimated energy needs:   Estimated energy needs using 52 kg IBW:  increased needs per wound and pressure ulcer healing. needs calculated based on TPN as primary route of nutrition.  30-35 kcal/kg (3606-4725 kcal).   1.8-2 g/kg ( g protein).  30-35 mL/kg (6476-6681 mL fluid).     Subjective:   Plan for CT today w/ oral contrast. Nutrition course remains the same. Pt is NPO w/ ice chips and sips of water. Per note in Patient Care orders- pt can have an italian icee at lunch and dinner.   No apparent GI distress. Will continue to follow wts to assess adjusting nutrient needs.  TPN bag infusing at 73ml/hr providing 355g Dex, 140g AA, no lipids today (1767 kcal, 2.7g pro/kg IBW pro, GIR 3.16-based most recent standing wt).   Please update weights weekly to trend adequacy of nutrient intake via TPN.     Previous Nutrition Diagnosis: Increased nutrient needs RT increased demand for nutrients AEB wound and pressure ulcer healing   Active [  X]  Resolved [   ]    If resolved, new PES: N/A    Goal: Pt to meet >75% EER via tolerated route    Recommendations:  1) Continue w/ current TPN order as tolerated and assess feasibility of other routes w/wound healing  2) Monitor triglycerides & adjust lipids per MD discretion -Please obtain new lab values.  3) Please continue to take standing weight for trending purposes  4) Consider OsCal tablet crushed w/ water/italian ice     Education: Understands meeting nutrient needs via TPN.    Risk Level: High [  X ] Moderate [   ] Low [   ]. Admitting Diagnosis:   Patient is a 57y old  Female who presents with a chief complaint of enterocutaneous fistula (24 Jul 2017 14:15)      PAST MEDICAL & SURGICAL HISTORY:  Reflux  Obesity  DVT (deep venous thrombosis): 2013 neck  Diverticulitis  Hypertension  Elective surgery: abodominal wall surgery  dec 2016  Elective surgery: NOV 2016 gastric bypass revision  Gastric bypass status for obesity: gastric sleeve, 6/2012  S/P breast biopsy: 2011, left  S/P colon resection: 2011  S/P knee surgery: repair 2009, 2011  right; left  Umbilical hernia: 2000  S/P appendectomy: 1975  S/P tonsillectomy: 1967      Current Nutrition Order:  NPO except ice chips and water  >> TPN via central venous line:  1752 TV (73ml/hr); 355g D, 140g AA, no lipids today (1767kcal calories): (1767 kcal, 2.7g/kg IBW pro, GIR 3.16 based on most recent standing wt)    PO Intake: Good (%) [   ]  Fair (50-75%) [   ] Poor (<25%) [   ] N/A   Per note in Patient Care order- pt can have an italian icee at lunch and dinner. pt is requesting 2 per day. Discussed w/ patient purpose of having limited PO intake.    GI Issues: No reported GI distress at this time.    Pain: No C/o pain.    Skin Integrity: sacrum un-stageable PU; sacral abrasion; abd wound--> NGtube placed w/ sponge through pouch into fistula for suction  Continuous vac changes until ready for Sx    Labs:   01-04    135  |  98  |  29<H>  ----------------------------<  153<H>  4.0   |  28  |  0.73    Ca    9.3      04 Jan 2018 08:42  Phos  3.7     01-03  Mg     2.1     01-03      CAPILLARY BLOOD GLUCOSE      POCT Blood Glucose.: 119 mg/dL (04 Jan 2018 11:32)  POCT Blood Glucose.: 142 mg/dL (04 Jan 2018 05:28)  POCT Blood Glucose.: 158 mg/dL (04 Jan 2018 00:00)  POCT Blood Glucose.: 200 mg/dL (03 Jan 2018 17:06)      Medications:  MEDICATIONS  (STANDING):  bismuth subsalicylate Liquid 30 milliLiter(s) Oral daily  calcitriol Injectable 1 MICROGram(s) IV Push <User Schedule>  collagenase Ointment 1 Application(s) Topical daily  cyanocobalamin Injectable 1000 MICROGram(s) SubCutaneous every 7 days  dextrose 10%. 1000 milliLiter(s) (50 mL/Hr) IV Continuous <Continuous>  dextrose 5%. 1000 milliLiter(s) (50 mL/Hr) IV Continuous <Continuous>  dextrose 50% Injectable 25 Gram(s) IV Push once  dextrose 50% Injectable 12.5 Gram(s) IV Push once  diazepam    Tablet 5 milliGRAM(s) Oral at bedtime  diphenhydrAMINE   Injectable 50 milliGRAM(s) IV Push every 6 hours  enoxaparin Injectable 40 milliGRAM(s) SubCutaneous two times a day  escitalopram 20 milliGRAM(s) Oral daily  fat emulsion (Plant Based) 20% Infusion 0.503 Gm/kG/Day (20.853 mL/Hr) IV Continuous <Continuous>  gabapentin 100 milliGRAM(s) Oral two times a day  heparin  flush 100 Units/mL Injectable 100 Unit(s) IV Push every other day  heparin  flush 100 Units/mL Injectable 100 Unit(s) IV Push every other day  heparin  flush 100 Units/mL Injectable 100 Unit(s) IV Push every other day  HYDROmorphone  Injectable 0.5 milliGRAM(s) IV Push once  insulin lispro (HumaLOG) corrective regimen sliding scale   SubCutaneous every 6 hours  lidocaine   Patch 1 Patch Transdermal daily  losartan 50 milliGRAM(s) Oral daily  nystatin Powder 1 Application(s) Topical two times a day  octreotide  Injectable 450 MICROGram(s) IV Push daily  ondansetron   Disintegrating Tablet 8 milliGRAM(s) Oral once  oxybutynin 10 milliGRAM(s) Oral two times a day  pantoprazole  Injectable 40 milliGRAM(s) IV Push daily  Parenteral Nutrition - Adult 1 Each (73 mL/Hr) TPN Continuous <Continuous>  Parenteral Nutrition - Adult 1 Each (73 mL/Hr) TPN Continuous <Continuous>  piperacillin/tazobactam IVPB. 3.375 Gram(s) IV Intermittent every 6 hours  sodium chloride 0.9% lock flush 20 milliLiter(s) IV Push once  vancomycin  IVPB 750 milliGRAM(s) IV Intermittent every 12 hours    MEDICATIONS  (PRN):  acetaminophen   Tablet. 325 milliGRAM(s) Oral every 4 hours PRN Moderate Pain (4 - 6)  ondansetron Injectable 4 milliGRAM(s) IV Push every 6 hours PRN Nausea and/or Vomiting  sodium chloride 0.9% lock flush 10 milliLiter(s) IV Push every 1 hour PRN After each medication administration  sodium chloride 0.9% lock flush 10 milliLiter(s) IV Push every 12 hours PRN Lumen of catheter NOT used      Weight:  172.5 (12/19)  171.5lb (12/14)  167lb (11/13)  164lb (10/17)  219lb (8/1)    Weight Change:   03/2016: 89kg  02/2017: 74kg   07/2017: 76kg  11/2017: 76kg   Pt's most recent wt is 172.5lbs per standing scale --indicating a 5.5lb wt gain since last month. Pt stated she would get on scale during PT- discussed plan w/ RN.    Estimated energy needs:   Estimated energy needs using 52 kg IBW:  increased needs per wound and pressure ulcer healing. needs calculated based on TPN as primary route of nutrition.  30-35 kcal/kg (3574-9431 kcal).   1.8-2 g/kg ( g protein).  30-35 mL/kg (0922-9373 mL fluid).     Subjective:   Plan for CT today w/ oral contrast, did not tolerate rectal contrast. Nutrition course remains the same. Pt is NPO w/ ice chips and sips of water. Per note in Patient Care orders- pt can have an italian icee at lunch and dinner.   No apparent GI distress. Will continue to follow wts to assess adjusting nutrient needs.  TPN bag infusing at 73ml/hr providing 355g Dex, 140g AA, no lipids today (1767 kcal, 2.7g pro/kg IBW pro, GIR 3.16-based most recent standing wt).   Please update weights weekly to trend adequacy of nutrient intake via TPN.     Previous Nutrition Diagnosis: Increased nutrient needs RT increased demand for nutrients AEB wound and pressure ulcer healing   Active [  X]  Resolved [   ]    If resolved, new PES: N/A    Goal: Pt to meet >75% EER via tolerated route    Recommendations:  1) Continue w/ current TPN order as tolerated and assess feasibility of other routes w/wound healing  2) Monitor triglycerides & adjust lipids per MD discretion -Please obtain new lab values.  3) Please continue to take standing weight for trending purposes  4) Consider OsCal tablet crushed w/ water/italian ice     Education: Understands meeting nutrient needs via TPN.    Risk Level: High [  X ] Moderate [   ] Low [   ]. Admitting Diagnosis:   Patient is a 57y old  Female who presents with a chief complaint of enterocutaneous fistula (24 Jul 2017 14:15)      PAST MEDICAL & SURGICAL HISTORY:  Reflux  Obesity  DVT (deep venous thrombosis): 2013 neck  Diverticulitis  Hypertension  Elective surgery: abodominal wall surgery  dec 2016  Elective surgery: NOV 2016 gastric bypass revision  Gastric bypass status for obesity: gastric sleeve, 6/2012  S/P breast biopsy: 2011, left  S/P colon resection: 2011  S/P knee surgery: repair 2009, 2011  right; left  Umbilical hernia: 2000  S/P appendectomy: 1975  S/P tonsillectomy: 1967      Current Nutrition Order:  NPO except ice chips and water  >> TPN via central venous line:  1752 TV (73ml/hr); 355g D, 140g AA, no lipids today (1767kcal calories): (1767 kcal, 2.7g/kg IBW pro, GIR 3.16 based on most recent standing wt)    PO Intake: Good (%) [   ]  Fair (50-75%) [   ] Poor (<25%) [   ] N/A   Per note in Patient Care order- pt can have an italian icee at lunch and dinner. pt is requesting 2 per day. Discussed w/ patient purpose of having limited PO intake.    GI Issues: No reported GI distress at this time.    Pain: No C/o pain.    Skin Integrity: sacrum un-stageable PU; sacral abrasion; abd wound--> NGtube placed w/ sponge through pouch into fistula for suction  Continuous vac changes until ready for Sx    Labs:   01-04    135  |  98  |  29<H>  ----------------------------<  153<H>  4.0   |  28  |  0.73    Ca    9.3      04 Jan 2018 08:42  Phos  3.7     01-03  Mg     2.1     01-03      CAPILLARY BLOOD GLUCOSE      POCT Blood Glucose.: 119 mg/dL (04 Jan 2018 11:32)  POCT Blood Glucose.: 142 mg/dL (04 Jan 2018 05:28)  POCT Blood Glucose.: 158 mg/dL (04 Jan 2018 00:00)  POCT Blood Glucose.: 200 mg/dL (03 Jan 2018 17:06)      Medications:  MEDICATIONS  (STANDING):  bismuth subsalicylate Liquid 30 milliLiter(s) Oral daily  calcitriol Injectable 1 MICROGram(s) IV Push <User Schedule>  collagenase Ointment 1 Application(s) Topical daily  cyanocobalamin Injectable 1000 MICROGram(s) SubCutaneous every 7 days  dextrose 10%. 1000 milliLiter(s) (50 mL/Hr) IV Continuous <Continuous>  dextrose 5%. 1000 milliLiter(s) (50 mL/Hr) IV Continuous <Continuous>  dextrose 50% Injectable 25 Gram(s) IV Push once  dextrose 50% Injectable 12.5 Gram(s) IV Push once  diazepam    Tablet 5 milliGRAM(s) Oral at bedtime  diphenhydrAMINE   Injectable 50 milliGRAM(s) IV Push every 6 hours  enoxaparin Injectable 40 milliGRAM(s) SubCutaneous two times a day  escitalopram 20 milliGRAM(s) Oral daily  fat emulsion (Plant Based) 20% Infusion 0.503 Gm/kG/Day (20.853 mL/Hr) IV Continuous <Continuous>  gabapentin 100 milliGRAM(s) Oral two times a day  heparin  flush 100 Units/mL Injectable 100 Unit(s) IV Push every other day  heparin  flush 100 Units/mL Injectable 100 Unit(s) IV Push every other day  heparin  flush 100 Units/mL Injectable 100 Unit(s) IV Push every other day  HYDROmorphone  Injectable 0.5 milliGRAM(s) IV Push once  insulin lispro (HumaLOG) corrective regimen sliding scale   SubCutaneous every 6 hours  lidocaine   Patch 1 Patch Transdermal daily  losartan 50 milliGRAM(s) Oral daily  nystatin Powder 1 Application(s) Topical two times a day  octreotide  Injectable 450 MICROGram(s) IV Push daily  ondansetron   Disintegrating Tablet 8 milliGRAM(s) Oral once  oxybutynin 10 milliGRAM(s) Oral two times a day  pantoprazole  Injectable 40 milliGRAM(s) IV Push daily  Parenteral Nutrition - Adult 1 Each (73 mL/Hr) TPN Continuous <Continuous>  Parenteral Nutrition - Adult 1 Each (73 mL/Hr) TPN Continuous <Continuous>  piperacillin/tazobactam IVPB. 3.375 Gram(s) IV Intermittent every 6 hours  sodium chloride 0.9% lock flush 20 milliLiter(s) IV Push once  vancomycin  IVPB 750 milliGRAM(s) IV Intermittent every 12 hours    MEDICATIONS  (PRN):  acetaminophen   Tablet. 325 milliGRAM(s) Oral every 4 hours PRN Moderate Pain (4 - 6)  ondansetron Injectable 4 milliGRAM(s) IV Push every 6 hours PRN Nausea and/or Vomiting  sodium chloride 0.9% lock flush 10 milliLiter(s) IV Push every 1 hour PRN After each medication administration  sodium chloride 0.9% lock flush 10 milliLiter(s) IV Push every 12 hours PRN Lumen of catheter NOT used      Weight:  172.5 (12/19)  171.5lb (12/14)  167lb (11/13)  164lb (10/17)  219lb (8/1)    Weight Change:   03/2016: 89kg  02/2017: 74kg   07/2017: 76kg  11/2017: 76kg   Pt's most recent wt is 172.5lbs per standing scale --indicating a 5.5lb wt gain since last month. Pt stated she would get on scale during PT- discussed plan w/ RN.    Estimated energy needs:   Estimated energy needs using 52 kg IBW:  increased needs per wound and pressure ulcer healing. needs calculated based on TPN as primary route of nutrition.  30-35 kcal/kg (2594-1359 kcal).   1.8-2 g/kg ( g protein).  30-35 mL/kg (9588-5813 mL fluid).     Subjective:   Plan for CT today w/ oral contrast, did not tolerate rectal contrast. Nutrition course remains the same. Pt is NPO w/ ice chips and sips of water. Per note in Patient Care orders- pt can have an italian icee at lunch and dinner.   No apparent GI distress. Will continue to follow wts to assess adjusting nutrient needs.  TPN bag infusing at 73ml/hr providing 355g Dex, 140g AA, no lipids today (1767 kcal, 2.7g pro/kg IBW pro, GIR 3.16-based most recent standing wt).   Please update weights weekly to trend adequacy of nutrient intake via TPN.     Previous Nutrition Diagnosis: Increased nutrient needs RT increased demand for nutrients AEB wound and pressure ulcer healing   Active [  X]  Resolved [   ]    If resolved, new PES: N/A    Goal: Pt to meet >75% EER via tolerated route    Recommendations:  1) Continue w/ current TPN order as tolerated and assess feasibility of other routes w/wound healing  2) Monitor triglycerides & adjust lipids per MD discretion -Please obtain new lab values.  3) Please continue to take standing weight for trending purposes  4) Consider OsCal tablet crushed w/ water/italian ice   Discussed w/ team ordering new pre-alb labs    Education: Understands meeting nutrient needs via TPN.    Risk Level: High [  X ] Moderate [   ] Low [   ].

## 2018-01-04 NOTE — PROGRESS NOTE ADULT - ASSESSMENT
57 F s/p  SBR, fistula resection, esophagojejunostomy revision w/ post-op course complicated by RTOR for distal anastomosis breakdown, ATN, acute respiratory failure, & septic shock, MRSA bacteremia which resolved. Currently for wound care management.     NPO (sips and chips)/TPN   Pain/nausea control - only benadryl at midnight and dilaudid 0.25mg during PT session   IS/OOB with PT daily  ISS  ID abx course complete: (Vanco 12/1 - 12/25), Nystatin powder Vanco (1/3-) Zosyn ( 1/3-)  Benadryl for abdominal pruritus  SCDs, lovenox (therapeutic), OOBA  Lines :  L left subclavian line (10/4-12/2), L IJ (12/2-1/3)  AM labs, weekly albumin, pre-albumin and triglyceride (every friday).  Wet to dry dressing in place

## 2018-01-04 NOTE — PROGRESS NOTE ADULT - SUBJECTIVE AND OBJECTIVE BOX
Psychiatry Brief Follow Up Note    Patient introduced to new psychology intern.  Patient with concerns regarding billing.  Continues to deny active psychiatric symptoms.    Millie Hatfield MD  [facility] Psych[facility] Attending

## 2018-01-04 NOTE — PROGRESS NOTE ADULT - SUBJECTIVE AND OBJECTIVE BOX
INTERVAL HPI/OVERNIGHT EVENTS:  Patient reports fever and chills around midnight which resolved. Resting comfortably this morning.          ANTIBIOTICS/RELEVANT:  Vancomycin 750mg q12  Zosyn 3.375g q6    MEDICATIONS  (STANDING):  bismuth subsalicylate Liquid 30 milliLiter(s) Oral daily  calcitriol Injectable 1 MICROGram(s) IV Push <User Schedule>  collagenase Ointment 1 Application(s) Topical daily  cyanocobalamin Injectable 1000 MICROGram(s) SubCutaneous every 7 days  dextrose 10%. 1000 milliLiter(s) (50 mL/Hr) IV Continuous <Continuous>  dextrose 5%. 1000 milliLiter(s) (50 mL/Hr) IV Continuous <Continuous>  dextrose 50% Injectable 25 Gram(s) IV Push once  dextrose 50% Injectable 12.5 Gram(s) IV Push once  diazepam    Tablet 5 milliGRAM(s) Oral at bedtime  diphenhydrAMINE   Injectable 50 milliGRAM(s) IV Push every 6 hours  enoxaparin Injectable 40 milliGRAM(s) SubCutaneous two times a day  escitalopram 20 milliGRAM(s) Oral daily  fat emulsion (Plant Based) 20% Infusion 0.503 Gm/kG/Day (20.853 mL/Hr) IV Continuous <Continuous>  gabapentin 100 milliGRAM(s) Oral two times a day  heparin  flush 100 Units/mL Injectable 100 Unit(s) IV Push every other day  heparin  flush 100 Units/mL Injectable 100 Unit(s) IV Push every other day  heparin  flush 100 Units/mL Injectable 100 Unit(s) IV Push every other day  insulin lispro (HumaLOG) corrective regimen sliding scale   SubCutaneous every 6 hours  lidocaine   Patch 1 Patch Transdermal daily  losartan 50 milliGRAM(s) Oral daily  nystatin Powder 1 Application(s) Topical two times a day  octreotide  Injectable 450 MICROGram(s) IV Push daily  ondansetron   Disintegrating Tablet 8 milliGRAM(s) Oral once  oxybutynin 10 milliGRAM(s) Oral two times a day  pantoprazole  Injectable 40 milliGRAM(s) IV Push daily  Parenteral Nutrition - Adult 1 Each (73 mL/Hr) TPN Continuous <Continuous>  Parenteral Nutrition - Adult 1 Each (73 mL/Hr) TPN Continuous <Continuous>  piperacillin/tazobactam IVPB. 3.375 Gram(s) IV Intermittent every 6 hours  sodium chloride 0.9% lock flush 20 milliLiter(s) IV Push once  vancomycin  IVPB 750 milliGRAM(s) IV Intermittent every 12 hours    MEDICATIONS  (PRN):  acetaminophen   Tablet. 325 milliGRAM(s) Oral every 4 hours PRN Moderate Pain (4 - 6)  ondansetron Injectable 4 milliGRAM(s) IV Push every 6 hours PRN Nausea and/or Vomiting  sodium chloride 0.9% lock flush 10 milliLiter(s) IV Push every 1 hour PRN After each medication administration  sodium chloride 0.9% lock flush 10 milliLiter(s) IV Push every 12 hours PRN Lumen of catheter NOT used        Vital Signs Last 24 Hrs  T(C): 37.1 (2018 05:36), Max: 38.3 (2018 00:36)  T(F): 98.8 (2018 05:36), Max: 101 (2018 00:36)  HR: 87 (2018 05:36) (80 - 87)  BP: 135/87 (2018 05:36) (107/68 - 135/87)  BP(mean): --  RR: 17 (2018 05:36) (17 - 17)  SpO2: 96% (2018 05:36) (94% - 96%)    18 @ 07:  -  18 @ 07:00  --------------------------------------------------------  IN: 1945 mL / OUT: 200 mL / NET: 1745 mL    18 @ 07:01  -  18 @ 12:19  --------------------------------------------------------  IN: 250 mL / OUT: 0 mL / NET: 250 mL      PHYSICAL EXAM:  Constitutional:Well-developed, well nourished  Eyes:CL, EOMI  Ear/Nose/Throat: no oral lesion, no sinus tenderness on percussion	  Neck:no JVD, no lymphadenopathy, supple  Respiratory: CTA bilaterally  Cardiovascular: S1S2 RRR, no murmurs  Gastrointestinal:soft, wound vac in place, no complaints of pain, no erythema  Extremities:no e/e/c  Vascular: DP Pulse: right normal; left normal      LABS:                        10.0   4.8   )-----------( 163      ( 2018 08:42 )             33.1     01-04    135  |  98  |  29<H>  ----------------------------<  153<H>  4.0   |  28  |  0.73    Ca    9.3      2018 08:42  Phos  3.7       Mg     2.1             Urinalysis Basic - ( 2018 00:24 )    Color: Yellow / Appearance: SL Cloudy / S.020 / pH: x  Gluc: x / Ketone: NEGATIVE  / Bili: Negative / Urobili: 0.2 E.U./dL   Blood: x / Protein: NEGATIVE mg/dL / Nitrite: POSITIVE   Leuk Esterase: NEGATIVE / RBC: < 5 /HPF / WBC < 5 /HPF   Sq Epi: x / Non Sq Epi: Moderate /HPF / Bacteria: Many /HPF        MICROBIOLOGY:  Culture - Blood (18 @ 09:11)    Specimen Source: .Blood Blood    Culture Results:   No growth at 1 day.        RADIOLOGY & ADDITIONAL STUDIES: reviewed

## 2018-01-05 LAB
ANION GAP SERPL CALC-SCNC: 9 MMOL/L — SIGNIFICANT CHANGE UP (ref 5–17)
BUN SERPL-MCNC: 23 MG/DL — SIGNIFICANT CHANGE UP (ref 7–23)
CALCIUM SERPL-MCNC: 9.1 MG/DL — SIGNIFICANT CHANGE UP (ref 8.4–10.5)
CHLORIDE SERPL-SCNC: 100 MMOL/L — SIGNIFICANT CHANGE UP (ref 96–108)
CO2 SERPL-SCNC: 28 MMOL/L — SIGNIFICANT CHANGE UP (ref 22–31)
CREAT SERPL-MCNC: 0.68 MG/DL — SIGNIFICANT CHANGE UP (ref 0.5–1.3)
GLUCOSE BLDC GLUCOMTR-MCNC: 101 MG/DL — HIGH (ref 70–99)
GLUCOSE BLDC GLUCOMTR-MCNC: 115 MG/DL — HIGH (ref 70–99)
GLUCOSE BLDC GLUCOMTR-MCNC: 140 MG/DL — HIGH (ref 70–99)
GLUCOSE BLDC GLUCOMTR-MCNC: 147 MG/DL — HIGH (ref 70–99)
GLUCOSE BLDC GLUCOMTR-MCNC: 152 MG/DL — HIGH (ref 70–99)
GLUCOSE SERPL-MCNC: 137 MG/DL — HIGH (ref 70–99)
MAGNESIUM SERPL-MCNC: 2.2 MG/DL — SIGNIFICANT CHANGE UP (ref 1.6–2.6)
PHOSPHATE SERPL-MCNC: 3.4 MG/DL — SIGNIFICANT CHANGE UP (ref 2.5–4.5)
POTASSIUM SERPL-MCNC: 3.9 MMOL/L — SIGNIFICANT CHANGE UP (ref 3.5–5.3)
POTASSIUM SERPL-SCNC: 3.9 MMOL/L — SIGNIFICANT CHANGE UP (ref 3.5–5.3)
SODIUM SERPL-SCNC: 137 MMOL/L — SIGNIFICANT CHANGE UP (ref 135–145)
VANCOMYCIN TROUGH SERPL-MCNC: 12 UG/ML — SIGNIFICANT CHANGE UP (ref 10–20)

## 2018-01-05 PROCEDURE — 99232 SBSQ HOSP IP/OBS MODERATE 35: CPT | Mod: GC

## 2018-01-05 RX ORDER — ELECTROLYTE SOLUTION,INJ
1 VIAL (ML) INTRAVENOUS
Qty: 0 | Refills: 0 | Status: DISCONTINUED | OUTPATIENT
Start: 2018-01-05 | End: 2018-01-05

## 2018-01-05 RX ADMIN — Medication 5 MILLIGRAM(S): at 21:36

## 2018-01-05 RX ADMIN — GABAPENTIN 100 MILLIGRAM(S): 400 CAPSULE ORAL at 05:41

## 2018-01-05 RX ADMIN — GABAPENTIN 100 MILLIGRAM(S): 400 CAPSULE ORAL at 17:01

## 2018-01-05 RX ADMIN — NYSTATIN CREAM 1 APPLICATION(S): 100000 CREAM TOPICAL at 05:41

## 2018-01-05 RX ADMIN — Medication 10 MILLIGRAM(S): at 17:01

## 2018-01-05 RX ADMIN — Medication 325 MILLIGRAM(S): at 00:15

## 2018-01-05 RX ADMIN — Medication 325 MILLIGRAM(S): at 06:45

## 2018-01-05 RX ADMIN — ENOXAPARIN SODIUM 40 MILLIGRAM(S): 100 INJECTION SUBCUTANEOUS at 17:01

## 2018-01-05 RX ADMIN — PIPERACILLIN AND TAZOBACTAM 200 GRAM(S): 4; .5 INJECTION, POWDER, LYOPHILIZED, FOR SOLUTION INTRAVENOUS at 11:10

## 2018-01-05 RX ADMIN — Medication 325 MILLIGRAM(S): at 05:41

## 2018-01-05 RX ADMIN — Medication 10 MILLIGRAM(S): at 05:41

## 2018-01-05 RX ADMIN — Medication 1 EACH: at 18:28

## 2018-01-05 RX ADMIN — Medication 250 MILLIGRAM(S): at 08:12

## 2018-01-05 RX ADMIN — Medication 50 MILLIGRAM(S): at 05:41

## 2018-01-05 RX ADMIN — Medication 50 MILLIGRAM(S): at 23:01

## 2018-01-05 RX ADMIN — ENOXAPARIN SODIUM 40 MILLIGRAM(S): 100 INJECTION SUBCUTANEOUS at 05:41

## 2018-01-05 RX ADMIN — OCTREOTIDE ACETATE 450 MICROGRAM(S): 200 INJECTION, SOLUTION INTRAVENOUS; SUBCUTANEOUS at 11:11

## 2018-01-05 RX ADMIN — PANTOPRAZOLE SODIUM 40 MILLIGRAM(S): 20 TABLET, DELAYED RELEASE ORAL at 05:41

## 2018-01-05 RX ADMIN — LOSARTAN POTASSIUM 50 MILLIGRAM(S): 100 TABLET, FILM COATED ORAL at 05:41

## 2018-01-05 RX ADMIN — PIPERACILLIN AND TAZOBACTAM 200 GRAM(S): 4; .5 INJECTION, POWDER, LYOPHILIZED, FOR SOLUTION INTRAVENOUS at 05:41

## 2018-01-05 RX ADMIN — Medication 1 APPLICATION(S): at 11:19

## 2018-01-05 RX ADMIN — Medication 2: at 05:43

## 2018-01-05 RX ADMIN — ONDANSETRON 4 MILLIGRAM(S): 8 TABLET, FILM COATED ORAL at 18:28

## 2018-01-05 RX ADMIN — NYSTATIN CREAM 1 APPLICATION(S): 100000 CREAM TOPICAL at 18:28

## 2018-01-05 RX ADMIN — CALCITRIOL 1 MICROGRAM(S): 0.5 CAPSULE ORAL at 11:10

## 2018-01-05 RX ADMIN — Medication 50 MILLIGRAM(S): at 17:01

## 2018-01-05 RX ADMIN — ESCITALOPRAM OXALATE 20 MILLIGRAM(S): 10 TABLET, FILM COATED ORAL at 21:36

## 2018-01-05 RX ADMIN — Medication 50 MILLIGRAM(S): at 11:10

## 2018-01-05 NOTE — PROGRESS NOTE ADULT - SUBJECTIVE AND OBJECTIVE BOX
O/N: vanco trough with AM labs prior to next dose. VSS. BRAYDEN  1/4: CT abd/pelvis to be performed , VSS , BRAYDEN O/N: vanco trough with AM labs prior to next dose. VSS. BRAYDEN  1/4: CT abd/pelvis to be performed , VSS , BRAYDEN       SUBJECTIVE: Pt seen and examined at bedside. No acute complaints.   Flatus: [x ] YES [ ] NO             Bowel Movement: [x ] YES [ ] NO  Pain (0-10):            Pain Control Adequate: [x ] YES [ ] NO  Nausea: [ ] YES [x ] NO            Vomiting: [ ] YES [x ] NO  Diarrhea: [ ] YES [x ] NO         Constipation: [ ] YES [ x] NO     Chest Pain: [ ] YES [x ] NO    SOB:  [ ] YES [x ] NO    MEDICATIONS  (STANDING):  bismuth subsalicylate Liquid 30 milliLiter(s) Oral daily  calcitriol Injectable 1 MICROGram(s) IV Push <User Schedule>  collagenase Ointment 1 Application(s) Topical daily  cyanocobalamin Injectable 1000 MICROGram(s) SubCutaneous every 7 days  dextrose 5%. 1000 milliLiter(s) (50 mL/Hr) IV Continuous <Continuous>  dextrose 50% Injectable 25 Gram(s) IV Push once  dextrose 50% Injectable 12.5 Gram(s) IV Push once  diazepam    Tablet 5 milliGRAM(s) Oral at bedtime  diphenhydrAMINE   Injectable 50 milliGRAM(s) IV Push every 6 hours  enoxaparin Injectable 40 milliGRAM(s) SubCutaneous two times a day  escitalopram 20 milliGRAM(s) Oral daily  fat emulsion (Plant Based) 20% Infusion 0.503 Gm/kG/Day (20.853 mL/Hr) IV Continuous <Continuous>  gabapentin 100 milliGRAM(s) Oral two times a day  heparin  flush 100 Units/mL Injectable 100 Unit(s) IV Push every other day  heparin  flush 100 Units/mL Injectable 100 Unit(s) IV Push every other day  heparin  flush 100 Units/mL Injectable 100 Unit(s) IV Push every other day  insulin lispro (HumaLOG) corrective regimen sliding scale   SubCutaneous every 6 hours  lidocaine   Patch 1 Patch Transdermal daily  losartan 50 milliGRAM(s) Oral daily  nystatin Powder 1 Application(s) Topical two times a day  octreotide  Injectable 450 MICROGram(s) IV Push daily  ondansetron   Disintegrating Tablet 8 milliGRAM(s) Oral once  oxybutynin 10 milliGRAM(s) Oral two times a day  pantoprazole  Injectable 40 milliGRAM(s) IV Push daily  Parenteral Nutrition - Adult 1 Each (73 mL/Hr) TPN Continuous <Continuous>  piperacillin/tazobactam IVPB. 3.375 Gram(s) IV Intermittent every 6 hours  sodium chloride 0.9% lock flush 20 milliLiter(s) IV Push once  vancomycin  IVPB 750 milliGRAM(s) IV Intermittent every 12 hours    MEDICATIONS  (PRN):  acetaminophen   Tablet. 325 milliGRAM(s) Oral every 4 hours PRN Moderate Pain (4 - 6)  ondansetron Injectable 4 milliGRAM(s) IV Push every 6 hours PRN Nausea and/or Vomiting  sodium chloride 0.9% lock flush 10 milliLiter(s) IV Push every 1 hour PRN After each medication administration  sodium chloride 0.9% lock flush 10 milliLiter(s) IV Push every 12 hours PRN Lumen of catheter NOT used      Vital Signs Last 24 Hrs  T(C): 36.1 (05 Jan 2018 05:47), Max: 37.6 (04 Jan 2018 17:57)  T(F): 97 (05 Jan 2018 05:47), Max: 99.7 (04 Jan 2018 17:57)  HR: 66 (05 Jan 2018 05:47) (66 - 76)  BP: 135/82 (05 Jan 2018 05:47) (107/75 - 137/81)  BP(mean): --  RR: 16 (05 Jan 2018 05:47) (16 - 16)  SpO2: 97% (05 Jan 2018 05:47) (94% - 97%)    PHYSICAL EXAM:      Constitutional: A&Ox3    Respiratory: non labored breathing, no respiratory distress    Cardiovascular: NSR, RRR    Gastrointestinal: fistula manager intact.     Extremities: (-) edema                  I&O's Detail    04 Jan 2018 07:01  -  05 Jan 2018 07:00  --------------------------------------------------------  IN:    Fat Emulsion 20%: 208 mL    Solution: 250 mL    Solution: 100 mL    TPN (Total Parenteral Nutrition): 1679 mL  Total IN: 2237 mL    OUT:    Drain: 550 mL  Total OUT: 550 mL    Total NET: 1687 mL          LABS:                        10.0   4.8   )-----------( 163      ( 04 Jan 2018 08:42 )             33.1     01-05    137  |  100  |  23  ----------------------------<  137<H>  3.9   |  28  |  0.68    Ca    9.1      05 Jan 2018 07:14            RADIOLOGY & ADDITIONAL STUDIES:

## 2018-01-05 NOTE — PROGRESS NOTE ADULT - SUBJECTIVE AND OBJECTIVE BOX
INTERVAL HPI/OVERNIGHT EVENTS:  Patient was afebrile and had no acute events overnight.          ANTIBIOTICS/RELEVANT:  Vancomycin  Zosyn      MEDICATIONS  (STANDING):  bismuth subsalicylate Liquid 30 milliLiter(s) Oral daily  calcitriol Injectable 1 MICROGram(s) IV Push <User Schedule>  collagenase Ointment 1 Application(s) Topical daily  cyanocobalamin Injectable 1000 MICROGram(s) SubCutaneous every 7 days  dextrose 5%. 1000 milliLiter(s) (50 mL/Hr) IV Continuous <Continuous>  dextrose 50% Injectable 25 Gram(s) IV Push once  dextrose 50% Injectable 12.5 Gram(s) IV Push once  diazepam    Tablet 5 milliGRAM(s) Oral at bedtime  diphenhydrAMINE   Injectable 50 milliGRAM(s) IV Push every 6 hours  enoxaparin Injectable 40 milliGRAM(s) SubCutaneous two times a day  escitalopram 20 milliGRAM(s) Oral daily  gabapentin 100 milliGRAM(s) Oral two times a day  heparin  flush 100 Units/mL Injectable 100 Unit(s) IV Push every other day  heparin  flush 100 Units/mL Injectable 100 Unit(s) IV Push every other day  heparin  flush 100 Units/mL Injectable 100 Unit(s) IV Push every other day  insulin lispro (HumaLOG) corrective regimen sliding scale   SubCutaneous every 6 hours  lidocaine   Patch 1 Patch Transdermal daily  losartan 50 milliGRAM(s) Oral daily  nystatin Powder 1 Application(s) Topical two times a day  octreotide  Injectable 450 MICROGram(s) IV Push daily  ondansetron   Disintegrating Tablet 8 milliGRAM(s) Oral once  oxybutynin 10 milliGRAM(s) Oral two times a day  pantoprazole  Injectable 40 milliGRAM(s) IV Push daily  Parenteral Nutrition - Adult 1 Each (73 mL/Hr) TPN Continuous <Continuous>  piperacillin/tazobactam IVPB. 3.375 Gram(s) IV Intermittent every 6 hours  sodium chloride 0.9% lock flush 20 milliLiter(s) IV Push once  vancomycin  IVPB 750 milliGRAM(s) IV Intermittent every 12 hours    MEDICATIONS  (PRN):  acetaminophen   Tablet. 325 milliGRAM(s) Oral every 4 hours PRN Moderate Pain (4 - 6)  ondansetron Injectable 4 milliGRAM(s) IV Push every 6 hours PRN Nausea and/or Vomiting  sodium chloride 0.9% lock flush 10 milliLiter(s) IV Push every 1 hour PRN After each medication administration  sodium chloride 0.9% lock flush 10 milliLiter(s) IV Push every 12 hours PRN Lumen of catheter NOT used        Vital Signs Last 24 Hrs  T(C): 36.3 (05 Jan 2018 09:00), Max: 37.6 (04 Jan 2018 17:57)  T(F): 97.3 (05 Jan 2018 09:00), Max: 99.7 (04 Jan 2018 17:57)  HR: 68 (05 Jan 2018 09:00) (66 - 76)  BP: 138/82 (05 Jan 2018 09:00) (107/75 - 138/82)  BP(mean): --  RR: 16 (05 Jan 2018 09:00) (16 - 16)  SpO2: 95% (05 Jan 2018 09:00) (94% - 97%)    01-04-18 @ 07:01  -  01-05-18 @ 07:00  --------------------------------------------------------  IN: 2237 mL / OUT: 550 mL / NET: 1687 mL      PHYSICAL EXAM:  Constitutional:Well-developed, well nourished  Eyes:CL, EOMI  Ear/Nose/Throat: no oral lesion, no sinus tenderness on percussion	  Neck:no JVD, no lymphadenopathy, supple  Respiratory: CTA bilaterally  Cardiovascular: S1S2 RRR, no murmurs  Gastrointestinal:soft, wound vac in place, no complaints of pain, no erythema  Extremities:no e/e/c  Vascular: DP Pulse: right normal; left normal      LABS:                        10.0   4.8   )-----------( 163      ( 04 Jan 2018 08:42 )             33.1     01-05    137  |  100  |  23  ----------------------------<  137<H>  3.9   |  28  |  0.68    Ca    9.1      05 Jan 2018 07:14            MICROBIOLOGY:  Culture - Blood (01.03.18 @ 09:11)    Specimen Source: .Blood Blood    Culture Results:   No growth at 2 days.        RADIOLOGY & ADDITIONAL STUDIES:  < from: CT Abdomen and Pelvis w/ Oral Cont and w/ IV Cont (01.04.18 @ 15:58) >  EXAM:  CT ABDOMEN AND PELVIS OC IC                          PROCEDURE DATE:  01/04/2018    Quantity of Contrast in Vial in ml: 100 Contrast Used: Optiray 350  Quantity of Contrast Wasted in ml: 8           INTERPRETATION:  CT ABDOMEN and PELVIS with IV contrast dated 1/4/2018   3:58 PM    INDICATION: 57-year-old female with fever. History of gastric bypass   surgery and multiple complications.    TECHNIQUE: CT of the abdomen and pelvis was performed after intravenous   administration of contrast material. Axial, sagittal and coronal images   were produced and reviewed.  Enteric contrast was administered via the   rectal tube.    COMPARISON: Multiple prior CT exams, most recent dated 10/3/2017.    FINDINGS:  Images of the lower chest demonstrate small bilateral pleural effusions   with subjacent partial bilateral lower lobe collapse, worse on the left.   A small column of fluid density in the distal esophagus is noted,   suggesting a component of reflux.    The liver, spleen, gallbladder, right kidney and both adrenal glands are   unremarkable in appearance. There is unchanged appearance of the left   kidney and prominent renal pelvis. A stable appearing cortical   hypodensity is again noted in the left kidney. Small amount of residual   perisplenic ascites is noted.    No abdominal aortic aneurysm is seen. No retroperitoneal mass or   lymphadenopathy is seen.     Redemonstrated are extensive postsurgical changes with multiple   anastomotic sutures about the stomach and throughout several loops of   proximal small bowel, with a large left ventral abdominal mucous fistula.   Scattered areas of fat necrosis throughout the left abdominal peritoneum   are noted. Rectally instilled enteric contrast opacifies nearly the   entire colon, which is unremarkable in appearance. There is also   retrograde opacification of the distal ileum through an incompetent   ileocecal valve. There is no evidence of bowel obstruction or air-fluid   levels. No large amount of ascites or pneumoperitoneum. No drainable or   peripherally enhancing fluid collection in the abdomen and pelvis.    The urinary bladder is unremarkable in appearance. A stable appearing 1.8   cm hypodense lesion in the left ovary is again noted. The uterus and   right ovary are grossly unchanged in appearance.    Evaluation of the osseous structures demonstrates stable grade 1   degenerative anterolisthesis of L4 and L5. Stable unchanged appearance of   the pubic symphysis. No aggressive lytic or blastic osseous lesion.      IMPRESSION:  1. No evidence of bowel obstruction, large amount of ascites or   pneumoperitoneum. No drainable or peripherally enhancing fluid collection.    2. Trace amount of perisplenic ascites is noted.    3. Small bilateral pleural effusions with partial bilateral lower lobe   collapse, worse on the left.

## 2018-01-05 NOTE — PROGRESS NOTE ADULT - ATTENDING COMMENTS
Fevers resolved. Bcx ngtd. CT abdomen without evidence of intra-abdominal infection. Atelectasis noted at lung bases--?etiology of fevers. Incentive spirometer at bedside--she just needs to use it. f/u bcx until finalized. d/c vancomycin and zosyn and observe off antibiotics.   Please call with ? [phone]  To see again as requested

## 2018-01-05 NOTE — PROGRESS NOTE ADULT - ASSESSMENT
56 yo female with gastric bypass followed by prolonged hospital stay, c/b EC fistula, recurrent MRSA BSI, CoNS BSI, polymicrobial abdominal infection (GNR, Enterococcus, presumed anaerobes given malodor) with wound vac in place on abdomen    -Patient has not been febrile since yesterday with no other systemic indications of infection.  -CT abdomen/pelvis does not show intra-abdominal source of infection but shows b/l lung partial lower lobe collapse. Fevers likely attributable to atelectasis.  -Discontinue Vancomycin and Zosyn  -No growth in blood cultures sent yesterday at 2 day  -Please reconsult if there are changes or if needed again.

## 2018-01-05 NOTE — PROGRESS NOTE ADULT - ASSESSMENT
57 F s/p  SBR, fistula resection, esophagojejunostomy revision w/ post-op course complicated by RTOR for distal anastomosis breakdown, ATN, acute respiratory failure, & septic shock, MRSA bacteremia which resolved. Currently for wound care management and surgical planning.    1. Enterocutanous fistula  - wound care- ostomy manager to [facility]  - NPO/ TPN  - AM labs, weekly albumin, pre-albumin and triglyceride (every friday).      2. Fever- unclear eitology  - F/u ID recs Vanc/ Zosyn (1/3-) empirical  - F/U blood CTX NG x1 d    3. Hx of DVT   - Lovenox    4. Depression/ Anxiety   - escitalopram  - diazepam  - Gabapentin     5. GERD  - Protonix    6. PPX  OOB/ PT/ Is

## 2018-01-06 LAB
GLUCOSE BLDC GLUCOMTR-MCNC: 113 MG/DL — HIGH (ref 70–99)
GLUCOSE BLDC GLUCOMTR-MCNC: 126 MG/DL — HIGH (ref 70–99)
GLUCOSE BLDC GLUCOMTR-MCNC: 99 MG/DL — SIGNIFICANT CHANGE UP (ref 70–99)

## 2018-01-06 RX ORDER — ELECTROLYTE SOLUTION,INJ
1 VIAL (ML) INTRAVENOUS
Qty: 0 | Refills: 0 | Status: DISCONTINUED | OUTPATIENT
Start: 2018-01-06 | End: 2018-01-06

## 2018-01-06 RX ORDER — I.V. FAT EMULSION 20 G/100ML
0.51 EMULSION INTRAVENOUS
Qty: 50 | Refills: 0 | Status: DISCONTINUED | OUTPATIENT
Start: 2018-01-06 | End: 2018-01-06

## 2018-01-06 RX ADMIN — OCTREOTIDE ACETATE 450 MICROGRAM(S): 200 INJECTION, SOLUTION INTRAVENOUS; SUBCUTANEOUS at 12:42

## 2018-01-06 RX ADMIN — Medication 50 MILLIGRAM(S): at 12:33

## 2018-01-06 RX ADMIN — Medication 1 APPLICATION(S): at 12:37

## 2018-01-06 RX ADMIN — Medication 50 MILLIGRAM(S): at 23:13

## 2018-01-06 RX ADMIN — Medication 325 MILLIGRAM(S): at 06:02

## 2018-01-06 RX ADMIN — PANTOPRAZOLE SODIUM 40 MILLIGRAM(S): 20 TABLET, DELAYED RELEASE ORAL at 05:33

## 2018-01-06 RX ADMIN — Medication 50 MILLIGRAM(S): at 05:33

## 2018-01-06 RX ADMIN — NYSTATIN CREAM 1 APPLICATION(S): 100000 CREAM TOPICAL at 05:37

## 2018-01-06 RX ADMIN — ENOXAPARIN SODIUM 40 MILLIGRAM(S): 100 INJECTION SUBCUTANEOUS at 05:34

## 2018-01-06 RX ADMIN — Medication 50 MILLIGRAM(S): at 18:11

## 2018-01-06 RX ADMIN — NYSTATIN CREAM 1 APPLICATION(S): 100000 CREAM TOPICAL at 18:11

## 2018-01-06 RX ADMIN — I.V. FAT EMULSION 20.83 GM/KG/DAY: 20 EMULSION INTRAVENOUS at 22:35

## 2018-01-06 RX ADMIN — LOSARTAN POTASSIUM 50 MILLIGRAM(S): 100 TABLET, FILM COATED ORAL at 05:34

## 2018-01-06 RX ADMIN — Medication 325 MILLIGRAM(S): at 05:36

## 2018-01-06 RX ADMIN — ENOXAPARIN SODIUM 40 MILLIGRAM(S): 100 INJECTION SUBCUTANEOUS at 18:19

## 2018-01-06 RX ADMIN — ESCITALOPRAM OXALATE 20 MILLIGRAM(S): 10 TABLET, FILM COATED ORAL at 22:29

## 2018-01-06 RX ADMIN — GABAPENTIN 100 MILLIGRAM(S): 400 CAPSULE ORAL at 18:11

## 2018-01-06 RX ADMIN — Medication 5 MILLIGRAM(S): at 22:29

## 2018-01-06 RX ADMIN — Medication 1 EACH: at 18:11

## 2018-01-06 RX ADMIN — GABAPENTIN 100 MILLIGRAM(S): 400 CAPSULE ORAL at 05:34

## 2018-01-06 RX ADMIN — Medication 10 MILLIGRAM(S): at 18:12

## 2018-01-06 RX ADMIN — Medication 100 UNIT(S): at 12:33

## 2018-01-06 RX ADMIN — Medication 10 MILLIGRAM(S): at 05:34

## 2018-01-06 RX ADMIN — SODIUM CHLORIDE 10 MILLILITER(S): 9 INJECTION INTRAMUSCULAR; INTRAVENOUS; SUBCUTANEOUS at 05:36

## 2018-01-06 NOTE — PROGRESS NOTE ADULT - ASSESSMENT
57 F s/p  SBR, fistula resection, esophagojejunostomy revision w/ post-op course complicated by RTOR for distal anastomosis breakdown, ATN, acute respiratory failure, & septic shock, MRSA bacteremia which resolved. Currently for wound care management and surgical planning.    1. Enterocutanous fistula  - wound care- ostomy manager to [facility]  - NPO/ TPN  - AM labs, weekly albumin, pre-albumin and triglyceride (every friday).      2. Fever- unclear eitology  - F/u ID recs Vanc/ Zosyn (1/3-1/5)  - F/U blood CTX NG x1 d    3. Hx of DVT   - Lovenox    4. Depression/ Anxiety   - escitalopram  - diazepam  - Gabapentin     5. GERD  - Protonix    6. PPX  OOB/ PT/ Is 57 F s/p  SBR, fistula resection, esophagojejunostomy revision w/ post-op course complicated by RTOR for distal anastomosis breakdown, ATN, acute respiratory failure, & septic shock, MRSA bacteremia which resolved. Currently for wound care management and surgical planning.    1. Enterocutanous fistula  - wound care- ostomy manager to [facility]  - NPO/ TPN  - AM labs, weekly albumin, pre-albumin and triglyceride (every friday).      2. Fever- unclear eitology  - off abx    3. Hx of DVT   - Lovenox    4. Depression/ Anxiety   - escitalopram  - diazepam  - Gabapentin     5. GERD  - Protonix    6. PPX  OOB/ PT/ Is

## 2018-01-06 NOTE — PROGRESS NOTE ADULT - SUBJECTIVE AND OBJECTIVE BOX
On interval followup, the left subclavian venous catheter site is clean, dry, non-inflamed and non-tender.  T99 range. Notes, cultures and progress are followed.

## 2018-01-06 NOTE — PROGRESS NOTE ADULT - SUBJECTIVE AND OBJECTIVE BOX
O/N: VSS. BRAYDEN  1/5: afebrile. Antibiotics DCed O/N: VSS. BRAYDEN  1/5: afebrile. Antibiotics DCed      Vital Signs Last 24 Hrs  T(C): 36.8 (06 Jan 2018 05:44), Max: 37.5 (05 Jan 2018 16:46)  T(F): 98.3 (06 Jan 2018 05:44), Max: 99.5 (05 Jan 2018 16:46)  HR: 67 (06 Jan 2018 05:44) (67 - 82)  BP: 156/91 (06 Jan 2018 05:44) (124/83 - 156/91)  BP(mean): --  RR: 16 (06 Jan 2018 05:44) (16 - 16)  SpO2: 94% (06 Jan 2018 05:44) (94% - 96%)      I&O's Summary    05 Jan 2018 07:01  -  06 Jan 2018 07:00  --------------------------------------------------------  IN: 2175 mL / OUT: 1050 mL / NET: 1125 mL            Gen: NAD   Abd: soft, nt / nd   ostomy appliance over fistula

## 2018-01-07 LAB
ANION GAP SERPL CALC-SCNC: 9 MMOL/L — SIGNIFICANT CHANGE UP (ref 5–17)
BUN SERPL-MCNC: 22 MG/DL — SIGNIFICANT CHANGE UP (ref 7–23)
CALCIUM SERPL-MCNC: 9.6 MG/DL — SIGNIFICANT CHANGE UP (ref 8.4–10.5)
CHLORIDE SERPL-SCNC: 104 MMOL/L — SIGNIFICANT CHANGE UP (ref 96–108)
CO2 SERPL-SCNC: 27 MMOL/L — SIGNIFICANT CHANGE UP (ref 22–31)
CREAT SERPL-MCNC: 0.58 MG/DL — SIGNIFICANT CHANGE UP (ref 0.5–1.3)
GLUCOSE BLDC GLUCOMTR-MCNC: 109 MG/DL — HIGH (ref 70–99)
GLUCOSE BLDC GLUCOMTR-MCNC: 121 MG/DL — HIGH (ref 70–99)
GLUCOSE BLDC GLUCOMTR-MCNC: 122 MG/DL — HIGH (ref 70–99)
GLUCOSE BLDC GLUCOMTR-MCNC: 140 MG/DL — HIGH (ref 70–99)
GLUCOSE SERPL-MCNC: 142 MG/DL — HIGH (ref 70–99)
HCT VFR BLD CALC: 32.3 % — LOW (ref 34.5–45)
HGB BLD-MCNC: 9.7 G/DL — LOW (ref 11.5–15.5)
MAGNESIUM SERPL-MCNC: 2.1 MG/DL — SIGNIFICANT CHANGE UP (ref 1.6–2.6)
MCHC RBC-ENTMCNC: 26.1 PG — LOW (ref 27–34)
MCHC RBC-ENTMCNC: 30 G/DL — LOW (ref 32–36)
MCV RBC AUTO: 86.8 FL — SIGNIFICANT CHANGE UP (ref 80–100)
PHOSPHATE SERPL-MCNC: 3.1 MG/DL — SIGNIFICANT CHANGE UP (ref 2.5–4.5)
PLATELET # BLD AUTO: 222 K/UL — SIGNIFICANT CHANGE UP (ref 150–400)
POTASSIUM SERPL-MCNC: 4.3 MMOL/L — SIGNIFICANT CHANGE UP (ref 3.5–5.3)
POTASSIUM SERPL-SCNC: 4.3 MMOL/L — SIGNIFICANT CHANGE UP (ref 3.5–5.3)
RBC # BLD: 3.72 M/UL — LOW (ref 3.8–5.2)
RBC # FLD: 16.4 % — SIGNIFICANT CHANGE UP (ref 10.3–16.9)
SODIUM SERPL-SCNC: 140 MMOL/L — SIGNIFICANT CHANGE UP (ref 135–145)
TRIGL SERPL-MCNC: 142 MG/DL — SIGNIFICANT CHANGE UP (ref 10–149)
WBC # BLD: 4.2 K/UL — SIGNIFICANT CHANGE UP (ref 3.8–10.5)
WBC # FLD AUTO: 4.2 K/UL — SIGNIFICANT CHANGE UP (ref 3.8–10.5)

## 2018-01-07 RX ORDER — ELECTROLYTE SOLUTION,INJ
1 VIAL (ML) INTRAVENOUS
Qty: 0 | Refills: 0 | Status: DISCONTINUED | OUTPATIENT
Start: 2018-01-07 | End: 2018-01-07

## 2018-01-07 RX ADMIN — Medication 10 MILLIGRAM(S): at 18:59

## 2018-01-07 RX ADMIN — GABAPENTIN 100 MILLIGRAM(S): 400 CAPSULE ORAL at 18:59

## 2018-01-07 RX ADMIN — ESCITALOPRAM OXALATE 20 MILLIGRAM(S): 10 TABLET, FILM COATED ORAL at 21:59

## 2018-01-07 RX ADMIN — ENOXAPARIN SODIUM 40 MILLIGRAM(S): 100 INJECTION SUBCUTANEOUS at 18:59

## 2018-01-07 RX ADMIN — Medication 325 MILLIGRAM(S): at 22:58

## 2018-01-07 RX ADMIN — Medication 1 APPLICATION(S): at 12:31

## 2018-01-07 RX ADMIN — Medication 50 MILLIGRAM(S): at 18:59

## 2018-01-07 RX ADMIN — Medication 5 MILLIGRAM(S): at 21:59

## 2018-01-07 RX ADMIN — Medication 325 MILLIGRAM(S): at 21:58

## 2018-01-07 RX ADMIN — OCTREOTIDE ACETATE 450 MICROGRAM(S): 200 INJECTION, SOLUTION INTRAVENOUS; SUBCUTANEOUS at 12:31

## 2018-01-07 RX ADMIN — ONDANSETRON 4 MILLIGRAM(S): 8 TABLET, FILM COATED ORAL at 21:58

## 2018-01-07 RX ADMIN — Medication 50 MILLIGRAM(S): at 12:31

## 2018-01-07 RX ADMIN — LOSARTAN POTASSIUM 50 MILLIGRAM(S): 100 TABLET, FILM COATED ORAL at 05:58

## 2018-01-07 RX ADMIN — Medication 50 MILLIGRAM(S): at 05:57

## 2018-01-07 RX ADMIN — Medication 10 MILLIGRAM(S): at 05:55

## 2018-01-07 RX ADMIN — ENOXAPARIN SODIUM 40 MILLIGRAM(S): 100 INJECTION SUBCUTANEOUS at 05:57

## 2018-01-07 RX ADMIN — NYSTATIN CREAM 1 APPLICATION(S): 100000 CREAM TOPICAL at 05:58

## 2018-01-07 RX ADMIN — GABAPENTIN 100 MILLIGRAM(S): 400 CAPSULE ORAL at 05:57

## 2018-01-07 RX ADMIN — PANTOPRAZOLE SODIUM 40 MILLIGRAM(S): 20 TABLET, DELAYED RELEASE ORAL at 05:57

## 2018-01-07 RX ADMIN — NYSTATIN CREAM 1 APPLICATION(S): 100000 CREAM TOPICAL at 19:00

## 2018-01-07 NOTE — PROGRESS NOTE ADULT - ASSESSMENT
57 F s/p  SBR, fistula resection, esophagojejunostomy revision w/ post-op course complicated by RTOR for distal anastomosis breakdown, ATN, acute respiratory failure, & septic shock, MRSA bacteremia which resolved. Currently for wound care management and surgical planning.    1. Enterocutanous fistula  - wound care- ostomy manager to [facility]  - NPO/ TPN  - AM labs, weekly albumin, pre-albumin and triglyceride (every friday).      2. Fever- unclear eitology  - off abx    3. Hx of DVT   - Lovenox    4. Depression/ Anxiety   - escitalopram  - diazepam  - Gabapentin     5. GERD  - Protonix    6. PPX  OOB/ PT/ Is

## 2018-01-08 LAB
ALBUMIN SERPL ELPH-MCNC: 2.9 G/DL — LOW (ref 3.3–5)
ALP SERPL-CCNC: 248 U/L — HIGH (ref 40–120)
ALT FLD-CCNC: 63 U/L — HIGH (ref 10–45)
ANION GAP SERPL CALC-SCNC: 11 MMOL/L — SIGNIFICANT CHANGE UP (ref 5–17)
AST SERPL-CCNC: 50 U/L — HIGH (ref 10–40)
BILIRUB DIRECT SERPL-MCNC: <0.2 MG/DL — SIGNIFICANT CHANGE UP (ref 0–0.2)
BILIRUB INDIRECT FLD-MCNC: >0.3 MG/DL — SIGNIFICANT CHANGE UP (ref 0.2–1)
BILIRUB SERPL-MCNC: 0.5 MG/DL — SIGNIFICANT CHANGE UP (ref 0.2–1.2)
BUN SERPL-MCNC: 27 MG/DL — HIGH (ref 7–23)
CALCIUM SERPL-MCNC: 9.1 MG/DL — SIGNIFICANT CHANGE UP (ref 8.4–10.5)
CHLORIDE SERPL-SCNC: 102 MMOL/L — SIGNIFICANT CHANGE UP (ref 96–108)
CO2 SERPL-SCNC: 26 MMOL/L — SIGNIFICANT CHANGE UP (ref 22–31)
CREAT SERPL-MCNC: 0.54 MG/DL — SIGNIFICANT CHANGE UP (ref 0.5–1.3)
CULTURE RESULTS: SIGNIFICANT CHANGE UP
GLUCOSE BLDC GLUCOMTR-MCNC: 100 MG/DL — HIGH (ref 70–99)
GLUCOSE BLDC GLUCOMTR-MCNC: 104 MG/DL — HIGH (ref 70–99)
GLUCOSE BLDC GLUCOMTR-MCNC: 126 MG/DL — HIGH (ref 70–99)
GLUCOSE BLDC GLUCOMTR-MCNC: 134 MG/DL — HIGH (ref 70–99)
GLUCOSE SERPL-MCNC: 127 MG/DL — HIGH (ref 70–99)
MAGNESIUM SERPL-MCNC: 2.1 MG/DL — SIGNIFICANT CHANGE UP (ref 1.6–2.6)
PHOSPHATE SERPL-MCNC: 3.3 MG/DL — SIGNIFICANT CHANGE UP (ref 2.5–4.5)
POTASSIUM SERPL-MCNC: 4.6 MMOL/L — SIGNIFICANT CHANGE UP (ref 3.5–5.3)
POTASSIUM SERPL-SCNC: 4.6 MMOL/L — SIGNIFICANT CHANGE UP (ref 3.5–5.3)
PROT SERPL-MCNC: 6.8 G/DL — SIGNIFICANT CHANGE UP (ref 6–8.3)
SODIUM SERPL-SCNC: 139 MMOL/L — SIGNIFICANT CHANGE UP (ref 135–145)
SPECIMEN SOURCE: SIGNIFICANT CHANGE UP

## 2018-01-08 RX ORDER — ELECTROLYTE SOLUTION,INJ
1 VIAL (ML) INTRAVENOUS
Qty: 0 | Refills: 0 | Status: DISCONTINUED | OUTPATIENT
Start: 2018-01-08 | End: 2018-01-08

## 2018-01-08 RX ORDER — I.V. FAT EMULSION 20 G/100ML
0.51 EMULSION INTRAVENOUS
Qty: 50 | Refills: 0 | Status: DISCONTINUED | OUTPATIENT
Start: 2018-01-08 | End: 2018-01-08

## 2018-01-08 RX ADMIN — CALCITRIOL 1 MICROGRAM(S): 0.5 CAPSULE ORAL at 11:17

## 2018-01-08 RX ADMIN — NYSTATIN CREAM 1 APPLICATION(S): 100000 CREAM TOPICAL at 17:28

## 2018-01-08 RX ADMIN — Medication 10 MILLIGRAM(S): at 17:28

## 2018-01-08 RX ADMIN — Medication 325 MILLIGRAM(S): at 17:58

## 2018-01-08 RX ADMIN — ESCITALOPRAM OXALATE 20 MILLIGRAM(S): 10 TABLET, FILM COATED ORAL at 21:37

## 2018-01-08 RX ADMIN — Medication 325 MILLIGRAM(S): at 11:47

## 2018-01-08 RX ADMIN — ONDANSETRON 4 MILLIGRAM(S): 8 TABLET, FILM COATED ORAL at 14:36

## 2018-01-08 RX ADMIN — Medication 50 MILLIGRAM(S): at 06:00

## 2018-01-08 RX ADMIN — Medication 325 MILLIGRAM(S): at 17:28

## 2018-01-08 RX ADMIN — OCTREOTIDE ACETATE 450 MICROGRAM(S): 200 INJECTION, SOLUTION INTRAVENOUS; SUBCUTANEOUS at 11:19

## 2018-01-08 RX ADMIN — Medication 50 MILLIGRAM(S): at 11:17

## 2018-01-08 RX ADMIN — NYSTATIN CREAM 1 APPLICATION(S): 100000 CREAM TOPICAL at 05:44

## 2018-01-08 RX ADMIN — I.V. FAT EMULSION 21.14 GM/KG/DAY: 20 EMULSION INTRAVENOUS at 21:37

## 2018-01-08 RX ADMIN — Medication 50 MILLIGRAM(S): at 00:03

## 2018-01-08 RX ADMIN — ENOXAPARIN SODIUM 40 MILLIGRAM(S): 100 INJECTION SUBCUTANEOUS at 06:00

## 2018-01-08 RX ADMIN — Medication 10 MILLIGRAM(S): at 05:43

## 2018-01-08 RX ADMIN — Medication 1 EACH: at 17:28

## 2018-01-08 RX ADMIN — GABAPENTIN 100 MILLIGRAM(S): 400 CAPSULE ORAL at 17:28

## 2018-01-08 RX ADMIN — PANTOPRAZOLE SODIUM 40 MILLIGRAM(S): 20 TABLET, DELAYED RELEASE ORAL at 06:00

## 2018-01-08 RX ADMIN — Medication 325 MILLIGRAM(S): at 11:17

## 2018-01-08 RX ADMIN — Medication 5 MILLIGRAM(S): at 21:37

## 2018-01-08 RX ADMIN — Medication 325 MILLIGRAM(S): at 05:43

## 2018-01-08 RX ADMIN — Medication 325 MILLIGRAM(S): at 06:43

## 2018-01-08 RX ADMIN — Medication 50 MILLIGRAM(S): at 23:33

## 2018-01-08 RX ADMIN — Medication 50 MILLIGRAM(S): at 17:28

## 2018-01-08 RX ADMIN — GABAPENTIN 100 MILLIGRAM(S): 400 CAPSULE ORAL at 05:43

## 2018-01-08 RX ADMIN — ENOXAPARIN SODIUM 40 MILLIGRAM(S): 100 INJECTION SUBCUTANEOUS at 17:28

## 2018-01-08 RX ADMIN — LOSARTAN POTASSIUM 50 MILLIGRAM(S): 100 TABLET, FILM COATED ORAL at 05:43

## 2018-01-08 RX ADMIN — Medication 100 UNIT(S): at 11:17

## 2018-01-08 NOTE — PROGRESS NOTE ADULT - SUBJECTIVE AND OBJECTIVE BOX
O/N: BRAYDEN, VSS  1/7: BRAYDEN , VSS     STATUS POST:   7/24: SBR resection, fistula resection, revision of esophagogegunostomy drain through esophageal hiatus in R mediastinum  7/29: Ex-lap, SB anastamosis in BP limb breakdown with succus throughout abdomen  8/1: abd washout, drainage of abscess, biologic mesh placement, closure of abdominal wall, vac and retention suture  8/7: abd washout, mesh removal, frozen abd noted, LLQ CHECO replaced with benson drain  8/10: leak noted from previous anastamosis site on L side, mid abdomen malecot drain placed in enterotomy, JPs x 2 placed, necrotic fascia debrided, vicryl mesh sutured to fascia circumferentially, xeroform over mesh then vac dressing placed O/N: BRAYDEN, VSS  1/7: BRAYDEN , VSS     STATUS POST:   7/24: SBR resection, fistula resection, revision of esophagogegunostomy drain through esophageal hiatus in R mediastinum  7/29: Ex-lap, SB anastamosis in BP limb breakdown with succus throughout abdomen  8/1: abd washout, drainage of abscess, biologic mesh placement, closure of abdominal wall, vac and retention suture  8/7: abd washout, mesh removal, frozen abd noted, LLQ CHECO replaced with benson drain  8/10: leak noted from previous anastamosis site on L side, mid abdomen malecot drain placed in enterotomy, JPs x 2 placed, necrotic fascia debrided, vicryl mesh sutured to fascia circumferentially, xeroform over mesh then vac dressing placed    INTERVAL HPI/OVERNIGHT EVENTS:    STATUS POST:      POST OPERATIVE DAY #:     SUBJECTIVE: Pt seen and examined at bedside. No acute complaints.   Flatus: [x ] YES [ ] NO             Bowel Movement: [x ] YES [ ] NO  Pain (0-10):            Pain Control Adequate: [x ] YES [ ] NO  Nausea: [ ] YES [ x] NO            Vomiting: [ ] YES [x ] NO  Diarrhea: [ ] YES [x ] NO         Constipation: [ ] YES [ x] NO     Chest Pain: [ ] YES [x ] NO    SOB:  [ ] YES [ x] NO    MEDICATIONS  (STANDING):  bismuth subsalicylate Liquid 30 milliLiter(s) Oral daily  calcitriol Injectable 1 MICROGram(s) IV Push <User Schedule>  collagenase Ointment 1 Application(s) Topical daily  cyanocobalamin Injectable 1000 MICROGram(s) SubCutaneous every 7 days  dextrose 5%. 1000 milliLiter(s) (50 mL/Hr) IV Continuous <Continuous>  dextrose 50% Injectable 25 Gram(s) IV Push once  dextrose 50% Injectable 12.5 Gram(s) IV Push once  diazepam    Tablet 5 milliGRAM(s) Oral at bedtime  diphenhydrAMINE   Injectable 50 milliGRAM(s) IV Push every 6 hours  enoxaparin Injectable 40 milliGRAM(s) SubCutaneous two times a day  escitalopram 20 milliGRAM(s) Oral daily  fat emulsion (Plant Based) 20% Infusion 0.51 Gm/kG/Day (21.143 mL/Hr) IV Continuous <Continuous>  gabapentin 100 milliGRAM(s) Oral two times a day  heparin  flush 100 Units/mL Injectable 100 Unit(s) IV Push every other day  heparin  flush 100 Units/mL Injectable 100 Unit(s) IV Push every other day  heparin  flush 100 Units/mL Injectable 100 Unit(s) IV Push every other day  insulin lispro (HumaLOG) corrective regimen sliding scale   SubCutaneous every 6 hours  lidocaine   Patch 1 Patch Transdermal daily  losartan 50 milliGRAM(s) Oral daily  nystatin Powder 1 Application(s) Topical two times a day  octreotide  Injectable 450 MICROGram(s) IV Push daily  ondansetron   Disintegrating Tablet 8 milliGRAM(s) Oral once  oxybutynin 10 milliGRAM(s) Oral two times a day  pantoprazole  Injectable 40 milliGRAM(s) IV Push daily  Parenteral Nutrition - Adult 1 Each (73 mL/Hr) TPN Continuous <Continuous>  Parenteral Nutrition - Adult 1 Each (73 mL/Hr) TPN Continuous <Continuous>  sodium chloride 0.9% lock flush 20 milliLiter(s) IV Push once    MEDICATIONS  (PRN):  acetaminophen   Tablet. 325 milliGRAM(s) Oral every 4 hours PRN Moderate Pain (4 - 6)  ondansetron Injectable 4 milliGRAM(s) IV Push every 6 hours PRN Nausea and/or Vomiting  sodium chloride 0.9% lock flush 10 milliLiter(s) IV Push every 1 hour PRN After each medication administration  sodium chloride 0.9% lock flush 10 milliLiter(s) IV Push every 12 hours PRN Lumen of catheter NOT used      Vital Signs Last 24 Hrs  T(C): 36.9 (08 Jan 2018 08:25), Max: 37.1 (07 Jan 2018 15:13)  T(F): 98.5 (08 Jan 2018 08:25), Max: 98.7 (07 Jan 2018 15:13)  HR: 61 (08 Jan 2018 08:25) (61 - 85)  BP: 144/71 (08 Jan 2018 08:25) (123/79 - 160/95)  BP(mean): --  RR: 16 (08 Jan 2018 08:25) (16 - 17)  SpO2: 95% (08 Jan 2018 08:25) (94% - 97%)    PHYSICAL EXAM:        Gen: NAD   Abd: soft, nt / nd   ostomy appliance over fistula                 I&O's Detail    07 Jan 2018 07:01  -  08 Jan 2018 07:00  --------------------------------------------------------  IN:    TPN (Total Parenteral Nutrition): 876 mL  Total IN: 876 mL    OUT:    Drain: 430 mL    Drain: 100 mL  Total OUT: 530 mL    Total NET: 346 mL      08 Jan 2018 07:01  -  08 Jan 2018 11:19  --------------------------------------------------------  IN:    TPN (Total Parenteral Nutrition): 73 mL    TPN (Total Parenteral Nutrition): 146 mL  Total IN: 219 mL    OUT:  Total OUT: 0 mL    Total NET: 219 mL          LABS:                        9.7    4.2   )-----------( 222      ( 07 Jan 2018 08:30 )             32.3     01-08    139  |  102  |  27<H>  ----------------------------<  127<H>  4.6   |  26  |  0.54    Ca    9.1      08 Jan 2018 06:06  Phos  3.3     01-08  Mg     2.1     01-08    TPro  6.8  /  Alb  2.9<L>  /  TBili  0.5  /  DBili  <0.2  /  AST  50<H>  /  ALT  63<H>  /  AlkPhos  248<H>  01-08          RADIOLOGY & ADDITIONAL STUDIES:

## 2018-01-08 NOTE — CHART NOTE - NSCHARTNOTEFT_GEN_A_CORE
Admitting Diagnosis:   Patient is a 57y old  Female who presents with a chief complaint of enterocutaneous fistula (24 Jul 2017 14:15)      PAST MEDICAL & SURGICAL HISTORY:  Reflux  Obesity  DVT (deep venous thrombosis): 2013 neck  Diverticulitis  Hypertension  Elective surgery: abodominal wall surgery  dec 2016  Elective surgery: NOV 2016 gastric bypass revision  Gastric bypass status for obesity: gastric sleeve, 6/2012  S/P breast biopsy: 2011, left  S/P colon resection: 2011  S/P knee surgery: repair 2009, 2011  right; left  Umbilical hernia: 2000  S/P appendectomy: 1975  S/P tonsillectomy: 1967      Current Nutrition Order:  NPO except ice chips and water  >> TPN via central venous line:  1752 TV (73ml/hr); 355g D, 104g AA, no lipids today (1623kcal calories w/o lipids): (1623 kcal, 2g/kg IBW pro, GIR 3.14 based on most recent standing wt)    PO Intake: Good (%) [   ]  Fair (50-75%) [   ] Poor (<25%) [   ] N/A   Per note in Patient Care order- pt can 2 sucking candys/day and an italian icee at lunch and dinner. pt is requesting 2 per day. Discussed w/ patient purpose of having limited PO intake.    GI Issues: No reported GI distress at this time.    Pain: No C/o pain.    Skin Integrity: sacrum un-stageable PU; sacral abrasion; abd wound--> NGtube placed w/ sponge through pouch into fistula for suction  Continuous vac changes until ready for Sx    Labs:   01-08    139  |  102  |  27<H>  ----------------------------<  127<H>  4.6   |  26  |  0.54    Ca    9.1      08 Jan 2018 06:06  Phos  3.3     01-08  Mg     2.1     01-08    TPro  6.8  /  Alb  2.9<L>  /  TBili  0.5  /  DBili  <0.2  /  AST  50<H>  /  ALT  63<H>  /  AlkPhos  248<H>  01-08    CAPILLARY BLOOD GLUCOSE      POCT Blood Glucose.: 104 mg/dL (08 Jan 2018 11:33)  POCT Blood Glucose.: 134 mg/dL (08 Jan 2018 05:59)  POCT Blood Glucose.: 126 mg/dL (07 Jan 2018 23:55)  POCT Blood Glucose.: 109 mg/dL (07 Jan 2018 17:31)  POCT Blood Glucose.: 121 mg/dL (07 Jan 2018 15:10)      Medications:  MEDICATIONS  (STANDING):  bismuth subsalicylate Liquid 30 milliLiter(s) Oral daily  calcitriol Injectable 1 MICROGram(s) IV Push <User Schedule>  collagenase Ointment 1 Application(s) Topical daily  cyanocobalamin Injectable 1000 MICROGram(s) SubCutaneous every 7 days  dextrose 5%. 1000 milliLiter(s) (50 mL/Hr) IV Continuous <Continuous>  dextrose 50% Injectable 25 Gram(s) IV Push once  dextrose 50% Injectable 12.5 Gram(s) IV Push once  diazepam    Tablet 5 milliGRAM(s) Oral at bedtime  diphenhydrAMINE   Injectable 50 milliGRAM(s) IV Push every 6 hours  enoxaparin Injectable 40 milliGRAM(s) SubCutaneous two times a day  escitalopram 20 milliGRAM(s) Oral daily  fat emulsion (Plant Based) 20% Infusion 0.51 Gm/kG/Day (21.143 mL/Hr) IV Continuous <Continuous>  gabapentin 100 milliGRAM(s) Oral two times a day  heparin  flush 100 Units/mL Injectable 100 Unit(s) IV Push every other day  heparin  flush 100 Units/mL Injectable 100 Unit(s) IV Push every other day  heparin  flush 100 Units/mL Injectable 100 Unit(s) IV Push every other day  insulin lispro (HumaLOG) corrective regimen sliding scale   SubCutaneous every 6 hours  lidocaine   Patch 1 Patch Transdermal daily  losartan 50 milliGRAM(s) Oral daily  nystatin Powder 1 Application(s) Topical two times a day  octreotide  Injectable 450 MICROGram(s) IV Push daily  ondansetron   Disintegrating Tablet 8 milliGRAM(s) Oral once  oxybutynin 10 milliGRAM(s) Oral two times a day  pantoprazole  Injectable 40 milliGRAM(s) IV Push daily  Parenteral Nutrition - Adult 1 Each (73 mL/Hr) TPN Continuous <Continuous>  Parenteral Nutrition - Adult 1 Each (73 mL/Hr) TPN Continuous <Continuous>  sodium chloride 0.9% lock flush 20 milliLiter(s) IV Push once    MEDICATIONS  (PRN):  acetaminophen   Tablet. 325 milliGRAM(s) Oral every 4 hours PRN Moderate Pain (4 - 6)  ondansetron Injectable 4 milliGRAM(s) IV Push every 6 hours PRN Nausea and/or Vomiting  sodium chloride 0.9% lock flush 10 milliLiter(s) IV Push every 1 hour PRN After each medication administration  sodium chloride 0.9% lock flush 10 milliLiter(s) IV Push every 12 hours PRN Lumen of catheter NOT used      Weight:  172.5 (12/19)  171.5lb (12/14)  167lb (11/13)  164lb (10/17)  219lb (8/1)  Please obtain new standing weight when feasible.  Bed scale wt (for trending): 159lb  (1/8)    Weight Change:   03/2016: 89kg  02/2017: 74kg   07/2017: 76kg  11/2017: 76kg   Pt's most recent wt is 172.5lbs per standing scale --indicating a 5.5lb wt gain since last month. Pt stated she would get on scale during PT- discussed plan w/ RN.    Estimated energy needs:   Estimated energy needs using 52 kg IBW:  increased needs per wound and pressure ulcer healing. needs calculated based on TPN as primary route of nutrition.  30-35 kcal/kg (2503-2570 kcal).   1.8-2 g/kg ( g protein).  30-35 mL/kg (0891-1711 mL fluid).     Subjective:   Pt seen resting in bed. Continues to receive primary nutrition via TPN. Currently ordered for 355g D, 104g AA, no lipids today (1623kcal calories w/o lipids): (1623 kcal, 2g/kg IBW pro, GIR 3.14). Noted pt has been ordered for 104g AA vs previous 140g. Team aware. TPN adjustment pending wt changes and labs. Per note in Patient Care orders- pt can have an sucking candy and italian icee at lunch and dinner. No apparent GI distress. Please obtain new standing weight when medically feasible.     Previous Nutrition Diagnosis: Increased nutrient needs RT increased demand for nutrients AEB wound and pressure ulcer healing   Active [  X]  Resolved [   ]    If resolved, new PES: N/A    Goal: Pt to meet >75% EER via tolerated route    Recommendations:  1) Continue w/ current TPN order as tolerated and assess feasibility of other routes w/wound healing  2) Discussed w/ team ordering new pre-alb labs - will assess and adjust AA as needed  3) Monitor triglycerides & adjust lipids per MD discretion -Please obtain new lab values.  4) Please continue to take standing weight for trending purposes  5) Consider OsCal tablet crushed w/ water/italian ice       Education: Understands meeting nutrient needs via TPN.    Risk Level: High [  X ] Moderate [   ] Low [   ]. Admitting Diagnosis:   Patient is a 57y old  Female who presents with a chief complaint of enterocutaneous fistula (24 Jul 2017 14:15)      PAST MEDICAL & SURGICAL HISTORY:  Reflux  Obesity  DVT (deep venous thrombosis): 2013 neck  Diverticulitis  Hypertension  Elective surgery: abodominal wall surgery  dec 2016  Elective surgery: NOV 2016 gastric bypass revision  Gastric bypass status for obesity: gastric sleeve, 6/2012  S/P breast biopsy: 2011, left  S/P colon resection: 2011  S/P knee surgery: repair 2009, 2011  right; left  Umbilical hernia: 2000  S/P appendectomy: 1975  S/P tonsillectomy: 1967      Current Nutrition Order:  NPO except ice chips and water  >> TPN via central venous line:  1752 TV (73ml/hr); 355g D, 104g AA, no lipids today (1623kcal calories w/o lipids): (1623 kcal, 2g/kg IBW pro, GIR 3.14 based on most recent standing wt)    PO Intake: Good (%) [   ]  Fair (50-75%) [   ] Poor (<25%) [   ] N/A   Per note in Patient Care order- pt can 2 sucking candys/day and an italian icee at lunch and dinner. pt is requesting 2 per day. Discussed w/ patient purpose of having limited PO intake.    GI Issues: No reported GI distress at this time.    Pain: No C/o pain.    Skin Integrity: sacrum un-stageable PU; sacral abrasion; abd wound--> NGtube placed w/ sponge through pouch into fistula for suction  Continuous vac changes until ready for Sx    Labs:   01-08    139  |  102  |  27<H>  ----------------------------<  127<H>  4.6   |  26  |  0.54    Ca    9.1      08 Jan 2018 06:06  Phos  3.3     01-08  Mg     2.1     01-08    TPro  6.8  /  Alb  2.9<L>  /  TBili  0.5  /  DBili  <0.2  /  AST  50<H>  /  ALT  63<H>  /  AlkPhos  248<H>  01-08    CAPILLARY BLOOD GLUCOSE      POCT Blood Glucose.: 104 mg/dL (08 Jan 2018 11:33)  POCT Blood Glucose.: 134 mg/dL (08 Jan 2018 05:59)  POCT Blood Glucose.: 126 mg/dL (07 Jan 2018 23:55)  POCT Blood Glucose.: 109 mg/dL (07 Jan 2018 17:31)  POCT Blood Glucose.: 121 mg/dL (07 Jan 2018 15:10)      Medications:  MEDICATIONS  (STANDING):  bismuth subsalicylate Liquid 30 milliLiter(s) Oral daily  calcitriol Injectable 1 MICROGram(s) IV Push <User Schedule>  collagenase Ointment 1 Application(s) Topical daily  cyanocobalamin Injectable 1000 MICROGram(s) SubCutaneous every 7 days  dextrose 5%. 1000 milliLiter(s) (50 mL/Hr) IV Continuous <Continuous>  dextrose 50% Injectable 25 Gram(s) IV Push once  dextrose 50% Injectable 12.5 Gram(s) IV Push once  diazepam    Tablet 5 milliGRAM(s) Oral at bedtime  diphenhydrAMINE   Injectable 50 milliGRAM(s) IV Push every 6 hours  enoxaparin Injectable 40 milliGRAM(s) SubCutaneous two times a day  escitalopram 20 milliGRAM(s) Oral daily  fat emulsion (Plant Based) 20% Infusion 0.51 Gm/kG/Day (21.143 mL/Hr) IV Continuous <Continuous>  gabapentin 100 milliGRAM(s) Oral two times a day  heparin  flush 100 Units/mL Injectable 100 Unit(s) IV Push every other day  heparin  flush 100 Units/mL Injectable 100 Unit(s) IV Push every other day  heparin  flush 100 Units/mL Injectable 100 Unit(s) IV Push every other day  insulin lispro (HumaLOG) corrective regimen sliding scale   SubCutaneous every 6 hours  lidocaine   Patch 1 Patch Transdermal daily  losartan 50 milliGRAM(s) Oral daily  nystatin Powder 1 Application(s) Topical two times a day  octreotide  Injectable 450 MICROGram(s) IV Push daily  ondansetron   Disintegrating Tablet 8 milliGRAM(s) Oral once  oxybutynin 10 milliGRAM(s) Oral two times a day  pantoprazole  Injectable 40 milliGRAM(s) IV Push daily  Parenteral Nutrition - Adult 1 Each (73 mL/Hr) TPN Continuous <Continuous>  Parenteral Nutrition - Adult 1 Each (73 mL/Hr) TPN Continuous <Continuous>  sodium chloride 0.9% lock flush 20 milliLiter(s) IV Push once    MEDICATIONS  (PRN):  acetaminophen   Tablet. 325 milliGRAM(s) Oral every 4 hours PRN Moderate Pain (4 - 6)  ondansetron Injectable 4 milliGRAM(s) IV Push every 6 hours PRN Nausea and/or Vomiting  sodium chloride 0.9% lock flush 10 milliLiter(s) IV Push every 1 hour PRN After each medication administration  sodium chloride 0.9% lock flush 10 milliLiter(s) IV Push every 12 hours PRN Lumen of catheter NOT used      Weight:  1755.1 (1/8/18)  172.5 (12/19)  171.5lb (12/14)  167lb (11/13)  164lb (10/17)  219lb (8/1)    **UPDATE: pt weighed during PT today: standing wt is 175.1lb >> Indicates 8lb wt gain since admission.    Weight Change:   03/2016: 89kg  02/2017: 74kg   07/2017: 76kg  11/2017: 76kg   1/2018: 79.5  **UPDATE: pt weighed during PT today: standing wt is 175.1lb      Estimated energy needs:   Estimated energy needs using 52 kg IBW:  increased needs per wound and pressure ulcer healing. needs calculated based on TPN as primary route of nutrition.  30-35 kcal/kg (2344-3536 kcal).   1.8-2 g/kg ( g protein).  30-35 mL/kg (9671-4870 mL fluid).     Subjective:   Pt seen resting in bed. Continues to receive primary nutrition via TPN. Currently ordered for 355g D, 104g AA, no lipids today (1623kcal calories w/o lipids): (1623 kcal, 2g/kg IBW pro, GIR 3.14). Noted pt has been ordered for 104g AA vs previous 140g. Team aware. TPN adjustment pending wt changes and labs. Per note in Patient Care orders- pt can have an sucking candy and italian icee at lunch and dinner. No apparent GI distress. Please obtain new standing weight when medically feasible.     Previous Nutrition Diagnosis: Increased nutrient needs RT increased demand for nutrients AEB wound and pressure ulcer healing   Active [  X]  Resolved [   ]    If resolved, new PES: N/A    Goal: Pt to meet >75% EER via tolerated route    Recommendations:  1) Continue w/ current TPN order as tolerated and assess feasibility of other routes w/wound healing  2) Discussed w/ team ordering new pre-alb labs - will assess and adjust AA as needed  3) Monitor triglycerides & adjust lipids per MD discretion -Please obtain new lab values.  4) Please continue to take standing weight for trending purposes  5) Consider OsCal tablet crushed w/ water/italian ice     Education: Understands meeting nutrient needs via TPN.    Risk Level: High [  X ] Moderate [   ] Low [   ].

## 2018-01-08 NOTE — PROGRESS NOTE ADULT - ASSESSMENT
57 F s/p  SBR, fistula resection, esophagojejunostomy revision w/ post-op course complicated by RTOR for distal anastomosis breakdown, ATN, acute respiratory failure, & septic shock, MRSA bacteremia which resolved. Currently for wound care management and surgical planning.    1. Enterocutanous fistula  - wound care- ostomy manager to [facility]  - NPO/ TPN/2 sucking candy/day  - AM labs, weekly albumin, pre-albumin and triglyceride (every friday).      2. Fever- unclear eitology  - F/u ID recs Vanc/ Zosyn (1/3-1/5)  - F/U blood CTX NG x1 d    3. Hx of DVT   - Lovenox    4. Depression/ Anxiety   - escitalopram  - diazepam  - Gabapentin     5. GERD  - Protonix    6. PPX  OOB/ PT/ Is

## 2018-01-09 LAB
ANION GAP SERPL CALC-SCNC: 10 MMOL/L — SIGNIFICANT CHANGE UP (ref 5–17)
BUN SERPL-MCNC: 28 MG/DL — HIGH (ref 7–23)
CALCIUM SERPL-MCNC: 9.3 MG/DL — SIGNIFICANT CHANGE UP (ref 8.4–10.5)
CHLORIDE SERPL-SCNC: 101 MMOL/L — SIGNIFICANT CHANGE UP (ref 96–108)
CO2 SERPL-SCNC: 26 MMOL/L — SIGNIFICANT CHANGE UP (ref 22–31)
CREAT SERPL-MCNC: 0.57 MG/DL — SIGNIFICANT CHANGE UP (ref 0.5–1.3)
GLUCOSE BLDC GLUCOMTR-MCNC: 100 MG/DL — HIGH (ref 70–99)
GLUCOSE BLDC GLUCOMTR-MCNC: 109 MG/DL — HIGH (ref 70–99)
GLUCOSE BLDC GLUCOMTR-MCNC: 112 MG/DL — HIGH (ref 70–99)
GLUCOSE BLDC GLUCOMTR-MCNC: 120 MG/DL — HIGH (ref 70–99)
GLUCOSE SERPL-MCNC: 126 MG/DL — HIGH (ref 70–99)
MAGNESIUM SERPL-MCNC: 2.1 MG/DL — SIGNIFICANT CHANGE UP (ref 1.6–2.6)
PHOSPHATE SERPL-MCNC: 3.1 MG/DL — SIGNIFICANT CHANGE UP (ref 2.5–4.5)
POTASSIUM SERPL-MCNC: 4.4 MMOL/L — SIGNIFICANT CHANGE UP (ref 3.5–5.3)
POTASSIUM SERPL-SCNC: 4.4 MMOL/L — SIGNIFICANT CHANGE UP (ref 3.5–5.3)
PREALB SERPL-MCNC: 35 MG/DL — SIGNIFICANT CHANGE UP (ref 20–40)
SODIUM SERPL-SCNC: 137 MMOL/L — SIGNIFICANT CHANGE UP (ref 135–145)

## 2018-01-09 RX ORDER — ELECTROLYTE SOLUTION,INJ
1 VIAL (ML) INTRAVENOUS
Qty: 0 | Refills: 0 | Status: DISCONTINUED | OUTPATIENT
Start: 2018-01-09 | End: 2018-01-09

## 2018-01-09 RX ADMIN — Medication 1 EACH: at 19:14

## 2018-01-09 RX ADMIN — GABAPENTIN 100 MILLIGRAM(S): 400 CAPSULE ORAL at 05:03

## 2018-01-09 RX ADMIN — Medication 10 MILLIGRAM(S): at 17:56

## 2018-01-09 RX ADMIN — Medication 50 MILLIGRAM(S): at 23:05

## 2018-01-09 RX ADMIN — ENOXAPARIN SODIUM 40 MILLIGRAM(S): 100 INJECTION SUBCUTANEOUS at 17:56

## 2018-01-09 RX ADMIN — OCTREOTIDE ACETATE 450 MICROGRAM(S): 200 INJECTION, SOLUTION INTRAVENOUS; SUBCUTANEOUS at 11:18

## 2018-01-09 RX ADMIN — Medication 50 MILLIGRAM(S): at 11:17

## 2018-01-09 RX ADMIN — Medication 325 MILLIGRAM(S): at 23:23

## 2018-01-09 RX ADMIN — Medication 50 MILLIGRAM(S): at 17:56

## 2018-01-09 RX ADMIN — Medication 5 MILLIGRAM(S): at 21:50

## 2018-01-09 RX ADMIN — ENOXAPARIN SODIUM 40 MILLIGRAM(S): 100 INJECTION SUBCUTANEOUS at 05:02

## 2018-01-09 RX ADMIN — Medication 325 MILLIGRAM(S): at 17:56

## 2018-01-09 RX ADMIN — ESCITALOPRAM OXALATE 20 MILLIGRAM(S): 10 TABLET, FILM COATED ORAL at 22:05

## 2018-01-09 RX ADMIN — Medication 325 MILLIGRAM(S): at 11:17

## 2018-01-09 RX ADMIN — Medication 1 APPLICATION(S): at 05:02

## 2018-01-09 RX ADMIN — Medication 325 MILLIGRAM(S): at 18:26

## 2018-01-09 RX ADMIN — Medication 10 MILLIGRAM(S): at 05:02

## 2018-01-09 RX ADMIN — NYSTATIN CREAM 1 APPLICATION(S): 100000 CREAM TOPICAL at 05:03

## 2018-01-09 RX ADMIN — PANTOPRAZOLE SODIUM 40 MILLIGRAM(S): 20 TABLET, DELAYED RELEASE ORAL at 05:03

## 2018-01-09 RX ADMIN — Medication 50 MILLIGRAM(S): at 05:03

## 2018-01-09 RX ADMIN — NYSTATIN CREAM 1 APPLICATION(S): 100000 CREAM TOPICAL at 17:57

## 2018-01-09 RX ADMIN — GABAPENTIN 100 MILLIGRAM(S): 400 CAPSULE ORAL at 17:56

## 2018-01-09 RX ADMIN — Medication 325 MILLIGRAM(S): at 11:47

## 2018-01-09 RX ADMIN — LOSARTAN POTASSIUM 50 MILLIGRAM(S): 100 TABLET, FILM COATED ORAL at 05:03

## 2018-01-09 NOTE — PROGRESS NOTE ADULT - SUBJECTIVE AND OBJECTIVE BOX
O/N: BRAYDEN, VSS  1/8: BRAYDEN VSS OVERNIGHT EVENTS: BRAYDEN, VSS  1/8: BRAYDEN VSS      SUBJECTIVE: Patient denies any complaints   Flatus: [X] YES [] NO             Bowel Movement: [X ] YES [ ] NO  Pain (0-10):            Pain Control Adequate: [ X] YES [ ] NO  Nausea: [ ] YES [ X] NO            Vomiting: [ ] YES [X ] NO  Diarrhea: [ ] YES [ X] NO         Constipation: [ ] YES [ X] NO     Chest Pain: [ ] YES [X ] NO    SOB:  [ ] YES [X ] NO    enoxaparin Injectable 40 milliGRAM(s) SubCutaneous two times a day  heparin  flush 100 Units/mL Injectable 100 Unit(s) IV Push every other day  heparin  flush 100 Units/mL Injectable 100 Unit(s) IV Push every other day  heparin  flush 100 Units/mL Injectable 100 Unit(s) IV Push every other day  losartan 50 milliGRAM(s) Oral daily      Vital Signs Last 24 Hrs  T(C): 36.1 (09 Jan 2018 04:52), Max: 36.9 (08 Jan 2018 08:25)  T(F): 97 (09 Jan 2018 04:52), Max: 98.5 (08 Jan 2018 08:25)  HR: 71 (09 Jan 2018 04:52) (61 - 85)  BP: 131/79 (09 Jan 2018 04:52) (114/76 - 144/71)  BP(mean): --  RR: 16 (09 Jan 2018 04:52) (16 - 17)  SpO2: 95% (09 Jan 2018 04:52) (93% - 95%)  I&O's Detail    08 Jan 2018 07:01  -  09 Jan 2018 07:00  --------------------------------------------------------  IN:    Fat Emulsion 20%: 208 mL    TPN (Total Parenteral Nutrition): 146 mL    TPN (Total Parenteral Nutrition): 1752 mL  Total IN: 2106 mL    OUT:    Drain: 250 mL  Total OUT: 250 mL    Total NET: 1856 mL          General: NAD, resting comfortably in bed  C/V: NSR  Pulm: Nonlabored breathing, no respiratory distress  Abd: soft, NT/ND. Pink granulating tissue  Extrem: WWP, no edema, SCDs in place        LABS:                        9.7    4.2   )-----------( 222      ( 07 Jan 2018 08:30 )             32.3     01-09    137  |  101  |  28<H>  ----------------------------<  126<H>  4.4   |  26  |  0.57    Ca    9.3      09 Jan 2018 06:05  Phos  3.1     01-09  Mg     2.1     01-09    TPro  6.8  /  Alb  2.9<L>  /  TBili  0.5  /  DBili  <0.2  /  AST  50<H>  /  ALT  63<H>  /  AlkPhos  248<H>  01-08          RADIOLOGY & ADDITIONAL STUDIES:

## 2018-01-10 LAB
ANION GAP SERPL CALC-SCNC: 11 MMOL/L — SIGNIFICANT CHANGE UP (ref 5–17)
BUN SERPL-MCNC: 29 MG/DL — HIGH (ref 7–23)
CALCIUM SERPL-MCNC: 10 MG/DL — SIGNIFICANT CHANGE UP (ref 8.4–10.5)
CHLORIDE SERPL-SCNC: 101 MMOL/L — SIGNIFICANT CHANGE UP (ref 96–108)
CO2 SERPL-SCNC: 27 MMOL/L — SIGNIFICANT CHANGE UP (ref 22–31)
CREAT SERPL-MCNC: 0.67 MG/DL — SIGNIFICANT CHANGE UP (ref 0.5–1.3)
GLUCOSE BLDC GLUCOMTR-MCNC: 104 MG/DL — HIGH (ref 70–99)
GLUCOSE BLDC GLUCOMTR-MCNC: 114 MG/DL — HIGH (ref 70–99)
GLUCOSE BLDC GLUCOMTR-MCNC: 117 MG/DL — HIGH (ref 70–99)
GLUCOSE BLDC GLUCOMTR-MCNC: 121 MG/DL — HIGH (ref 70–99)
GLUCOSE BLDC GLUCOMTR-MCNC: 130 MG/DL — HIGH (ref 70–99)
GLUCOSE SERPL-MCNC: 120 MG/DL — HIGH (ref 70–99)
POTASSIUM SERPL-MCNC: 4.1 MMOL/L — SIGNIFICANT CHANGE UP (ref 3.5–5.3)
POTASSIUM SERPL-SCNC: 4.1 MMOL/L — SIGNIFICANT CHANGE UP (ref 3.5–5.3)
SODIUM SERPL-SCNC: 139 MMOL/L — SIGNIFICANT CHANGE UP (ref 135–145)

## 2018-01-10 RX ORDER — I.V. FAT EMULSION 20 G/100ML
250 EMULSION INTRAVENOUS ONCE
Qty: 0 | Refills: 0 | Status: COMPLETED | OUTPATIENT
Start: 2018-01-10 | End: 2018-01-10

## 2018-01-10 RX ORDER — DIAZEPAM 5 MG
5 TABLET ORAL AT BEDTIME
Qty: 0 | Refills: 0 | Status: DISCONTINUED | OUTPATIENT
Start: 2018-01-11 | End: 2018-01-18

## 2018-01-10 RX ORDER — ELECTROLYTE SOLUTION,INJ
1 VIAL (ML) INTRAVENOUS
Qty: 0 | Refills: 0 | Status: DISCONTINUED | OUTPATIENT
Start: 2018-01-10 | End: 2018-01-10

## 2018-01-10 RX ADMIN — I.V. FAT EMULSION 20.83 MILLILITER(S): 20 EMULSION INTRAVENOUS at 21:53

## 2018-01-10 RX ADMIN — NYSTATIN CREAM 1 APPLICATION(S): 100000 CREAM TOPICAL at 05:42

## 2018-01-10 RX ADMIN — Medication 50 MILLIGRAM(S): at 17:00

## 2018-01-10 RX ADMIN — GABAPENTIN 100 MILLIGRAM(S): 400 CAPSULE ORAL at 17:00

## 2018-01-10 RX ADMIN — GABAPENTIN 100 MILLIGRAM(S): 400 CAPSULE ORAL at 05:47

## 2018-01-10 RX ADMIN — Medication 325 MILLIGRAM(S): at 22:23

## 2018-01-10 RX ADMIN — ESCITALOPRAM OXALATE 20 MILLIGRAM(S): 10 TABLET, FILM COATED ORAL at 21:53

## 2018-01-10 RX ADMIN — Medication 50 MILLIGRAM(S): at 05:47

## 2018-01-10 RX ADMIN — ENOXAPARIN SODIUM 40 MILLIGRAM(S): 100 INJECTION SUBCUTANEOUS at 05:48

## 2018-01-10 RX ADMIN — Medication 5 MILLIGRAM(S): at 21:53

## 2018-01-10 RX ADMIN — LOSARTAN POTASSIUM 50 MILLIGRAM(S): 100 TABLET, FILM COATED ORAL at 05:47

## 2018-01-10 RX ADMIN — Medication 100 UNIT(S): at 11:09

## 2018-01-10 RX ADMIN — PANTOPRAZOLE SODIUM 40 MILLIGRAM(S): 20 TABLET, DELAYED RELEASE ORAL at 05:47

## 2018-01-10 RX ADMIN — ENOXAPARIN SODIUM 40 MILLIGRAM(S): 100 INJECTION SUBCUTANEOUS at 17:00

## 2018-01-10 RX ADMIN — NYSTATIN CREAM 1 APPLICATION(S): 100000 CREAM TOPICAL at 17:01

## 2018-01-10 RX ADMIN — Medication 325 MILLIGRAM(S): at 00:23

## 2018-01-10 RX ADMIN — OCTREOTIDE ACETATE 450 MICROGRAM(S): 200 INJECTION, SOLUTION INTRAVENOUS; SUBCUTANEOUS at 11:10

## 2018-01-10 RX ADMIN — Medication 10 MILLIGRAM(S): at 05:46

## 2018-01-10 RX ADMIN — Medication 1 EACH: at 17:52

## 2018-01-10 RX ADMIN — Medication 10 MILLIGRAM(S): at 17:00

## 2018-01-10 RX ADMIN — Medication 50 MILLIGRAM(S): at 23:05

## 2018-01-10 RX ADMIN — Medication 50 MILLIGRAM(S): at 11:09

## 2018-01-10 RX ADMIN — Medication 325 MILLIGRAM(S): at 21:53

## 2018-01-10 RX ADMIN — CALCITRIOL 1 MICROGRAM(S): 0.5 CAPSULE ORAL at 11:19

## 2018-01-10 RX ADMIN — Medication 1 APPLICATION(S): at 05:41

## 2018-01-10 NOTE — PROGRESS NOTE ADULT - SUBJECTIVE AND OBJECTIVE BOX
O/N: BRAYDEN, VSS  1/9: BRAYDEN VSS O/N: BRAYDEN, VSS  1/9: BRAYDEN VSS     SUBJECTIVE: Patient examined bedside denies any complaints   Flatus: [x] YES [] NO             Bowel Movement: [ x] YES [ ] NO  Pain (0-10):            Pain Control Adequate: [x ] YES [ ] NO  Nausea: [ ] YES [x ] NO            Vomiting: [ ] YES [x ] NO  Diarrhea: [ ] YES [x ] NO         Constipation: [ ] YES [x ] NO     Chest Pain: [ ] YES [ x] NO    SOB:  [ ] YES [x ] NO    enoxaparin Injectable 40 milliGRAM(s) SubCutaneous two times a day  heparin  flush 100 Units/mL Injectable 100 Unit(s) IV Push every other day  heparin  flush 100 Units/mL Injectable 100 Unit(s) IV Push every other day  heparin  flush 100 Units/mL Injectable 100 Unit(s) IV Push every other day  losartan 50 milliGRAM(s) Oral daily      Vital Signs Last 24 Hrs  T(C): 36.6 (10 Jorge 2018 05:42), Max: 37 (09 Jan 2018 20:54)  T(F): 97.9 (10 Jorge 2018 05:42), Max: 98.6 (09 Jan 2018 20:54)  HR: 70 (10 Jorge 2018 05:42) (68 - 79)  BP: 131/84 (10 Jorge 2018 05:42) (131/84 - 143/71)  BP(mean): --  RR: 17 (10 Jorge 2018 05:42) (16 - 17)  SpO2: 95% (10 Jorge 2018 05:42) (94% - 96%)  I&O's Detail    09 Jan 2018 07:01  -  10 Jorge 2018 07:00  --------------------------------------------------------  IN:    TPN (Total Parenteral Nutrition): 876 mL  Total IN: 876 mL    OUT:    Drain: 600 mL  Total OUT: 600 mL    Total NET: 276 mL          General: NAD, resting comfortably in bed  C/V: NSR  Pulm: Nonlabored breathing, no respiratory distress  Abd: soft, non distended , granulating tissue observed around fistula   Extrem: WWP, no edema, SCDs in place        LABS:    01-10    139  |  101  |  29<H>  ----------------------------<  120<H>  4.1   |  27  |  0.67    Ca    10.0      10 Jorge 2018 07:12  Phos  3.1     01-09  Mg     2.1     01-09            RADIOLOGY & ADDITIONAL STUDIES:

## 2018-01-10 NOTE — PROGRESS NOTE ADULT - SUBJECTIVE AND OBJECTIVE BOX
remainsstable  wound has contracted well with exposed bowel  have discussed with dr marmolejo and  plan reconstruction for last week in [facility]

## 2018-01-11 LAB
ANION GAP SERPL CALC-SCNC: 11 MMOL/L — SIGNIFICANT CHANGE UP (ref 5–17)
BUN SERPL-MCNC: 32 MG/DL — HIGH (ref 7–23)
CALCIUM SERPL-MCNC: 9.6 MG/DL — SIGNIFICANT CHANGE UP (ref 8.4–10.5)
CHLORIDE SERPL-SCNC: 102 MMOL/L — SIGNIFICANT CHANGE UP (ref 96–108)
CO2 SERPL-SCNC: 26 MMOL/L — SIGNIFICANT CHANGE UP (ref 22–31)
CREAT SERPL-MCNC: 0.64 MG/DL — SIGNIFICANT CHANGE UP (ref 0.5–1.3)
GLUCOSE BLDC GLUCOMTR-MCNC: 107 MG/DL — HIGH (ref 70–99)
GLUCOSE BLDC GLUCOMTR-MCNC: 109 MG/DL — HIGH (ref 70–99)
GLUCOSE BLDC GLUCOMTR-MCNC: 114 MG/DL — HIGH (ref 70–99)
GLUCOSE BLDC GLUCOMTR-MCNC: 86 MG/DL — SIGNIFICANT CHANGE UP (ref 70–99)
GLUCOSE SERPL-MCNC: 136 MG/DL — HIGH (ref 70–99)
POTASSIUM SERPL-MCNC: 4.2 MMOL/L — SIGNIFICANT CHANGE UP (ref 3.5–5.3)
POTASSIUM SERPL-SCNC: 4.2 MMOL/L — SIGNIFICANT CHANGE UP (ref 3.5–5.3)
SODIUM SERPL-SCNC: 139 MMOL/L — SIGNIFICANT CHANGE UP (ref 135–145)

## 2018-01-11 RX ORDER — ELECTROLYTE SOLUTION,INJ
1 VIAL (ML) INTRAVENOUS
Qty: 0 | Refills: 0 | Status: DISCONTINUED | OUTPATIENT
Start: 2018-01-11 | End: 2018-01-11

## 2018-01-11 RX ADMIN — Medication 50 MILLIGRAM(S): at 05:12

## 2018-01-11 RX ADMIN — ESCITALOPRAM OXALATE 20 MILLIGRAM(S): 10 TABLET, FILM COATED ORAL at 21:03

## 2018-01-11 RX ADMIN — GABAPENTIN 100 MILLIGRAM(S): 400 CAPSULE ORAL at 17:46

## 2018-01-11 RX ADMIN — OCTREOTIDE ACETATE 450 MICROGRAM(S): 200 INJECTION, SOLUTION INTRAVENOUS; SUBCUTANEOUS at 11:53

## 2018-01-11 RX ADMIN — LOSARTAN POTASSIUM 50 MILLIGRAM(S): 100 TABLET, FILM COATED ORAL at 05:12

## 2018-01-11 RX ADMIN — Medication 50 MILLIGRAM(S): at 17:46

## 2018-01-11 RX ADMIN — ENOXAPARIN SODIUM 40 MILLIGRAM(S): 100 INJECTION SUBCUTANEOUS at 05:12

## 2018-01-11 RX ADMIN — ONDANSETRON 4 MILLIGRAM(S): 8 TABLET, FILM COATED ORAL at 23:12

## 2018-01-11 RX ADMIN — NYSTATIN CREAM 1 APPLICATION(S): 100000 CREAM TOPICAL at 17:47

## 2018-01-11 RX ADMIN — ENOXAPARIN SODIUM 40 MILLIGRAM(S): 100 INJECTION SUBCUTANEOUS at 17:46

## 2018-01-11 RX ADMIN — Medication 325 MILLIGRAM(S): at 23:12

## 2018-01-11 RX ADMIN — Medication 1 EACH: at 17:46

## 2018-01-11 RX ADMIN — PANTOPRAZOLE SODIUM 40 MILLIGRAM(S): 20 TABLET, DELAYED RELEASE ORAL at 05:12

## 2018-01-11 RX ADMIN — GABAPENTIN 100 MILLIGRAM(S): 400 CAPSULE ORAL at 05:12

## 2018-01-11 RX ADMIN — Medication 1 APPLICATION(S): at 05:12

## 2018-01-11 RX ADMIN — NYSTATIN CREAM 1 APPLICATION(S): 100000 CREAM TOPICAL at 05:13

## 2018-01-11 RX ADMIN — Medication 10 MILLIGRAM(S): at 05:12

## 2018-01-11 RX ADMIN — Medication 50 MILLIGRAM(S): at 11:50

## 2018-01-11 RX ADMIN — Medication 5 MILLIGRAM(S): at 21:03

## 2018-01-11 RX ADMIN — Medication 50 MILLIGRAM(S): at 23:02

## 2018-01-11 RX ADMIN — PREGABALIN 1000 MICROGRAM(S): 225 CAPSULE ORAL at 11:50

## 2018-01-11 RX ADMIN — Medication 10 MILLIGRAM(S): at 17:46

## 2018-01-11 NOTE — PROGRESS NOTE ADULT - SUBJECTIVE AND OBJECTIVE BOX
O/N: BRAYDEN. VSS  1/10:BRAYDEN, VSS OVERNIGHT EVENTS: BRAYDEN. VSS  1/10:BRAYDEN, VSS        SUBJECTIVE: Patient denies any complaints   Flatus: [X] YES [] NO             Bowel Movement: [X ] YES [ ] NO  Pain (0-10):            Pain Control Adequate: [X ] YES [ ] NO  Nausea: [ ] YES [X ] NO            Vomiting: [ ] YES [X ] NO  Diarrhea: [ ] YES [X ] NO         Constipation: [ ] YES [X ] NO     Chest Pain: [ ] YES [X ] NO    SOB:  [ ] YES [ X] NO    enoxaparin Injectable 40 milliGRAM(s) SubCutaneous two times a day  heparin  flush 100 Units/mL Injectable 100 Unit(s) IV Push every other day  heparin  flush 100 Units/mL Injectable 100 Unit(s) IV Push every other day  heparin  flush 100 Units/mL Injectable 100 Unit(s) IV Push every other day  losartan 50 milliGRAM(s) Oral daily      Vital Signs Last 24 Hrs  T(C): 36.4 (11 Jan 2018 06:02), Max: 37.2 (10 Jorge 2018 14:16)  T(F): 97.6 (11 Jan 2018 06:02), Max: 98.9 (10 Jorge 2018 14:16)  HR: 65 (11 Jan 2018 06:02) (65 - 82)  BP: 148/78 (11 Jan 2018 06:02) (130/86 - 148/78)  BP(mean): --  RR: 17 (11 Jan 2018 06:02) (17 - 18)  SpO2: 95% (11 Jan 2018 06:02) (94% - 95%)  I&O's Detail    10 Jorge 2018 07:01  -  11 Jan 2018 07:00  --------------------------------------------------------  IN:    Oral Fluid: 120 mL  Total IN: 120 mL    OUT:    Drain: 100 mL  Total OUT: 100 mL    Total NET: 20 mL          General: NAD, resting comfortably in bed  C/V: NSR  Pulm: Nonlabored breathing, no respiratory distress  Abd: Soft, non-distended, TTP around incision site, incision clean dry and intact   Extrem: WWP, no edema, SCDs in place        LABS:    01-11    139  |  102  |  32<H>  ----------------------------<  136<H>  4.2   |  26  |  0.64    Ca    9.6      11 Jan 2018 06:56 OVERNIGHT EVENTS: BRAYDEN. VSS  1/10:BRAYDEN, VSS        SUBJECTIVE: Patient denies any complaints   Flatus: [X] YES [] NO             Bowel Movement: [X ] YES [ ] NO  Pain (0-10):            Pain Control Adequate: [X ] YES [ ] NO  Nausea: [ ] YES [X ] NO            Vomiting: [ ] YES [X ] NO  Diarrhea: [ ] YES [X ] NO         Constipation: [ ] YES [X ] NO     Chest Pain: [ ] YES [X ] NO    SOB:  [ ] YES [ X] NO    enoxaparin Injectable 40 milliGRAM(s) SubCutaneous two times a day  heparin  flush 100 Units/mL Injectable 100 Unit(s) IV Push every other day  heparin  flush 100 Units/mL Injectable 100 Unit(s) IV Push every other day  heparin  flush 100 Units/mL Injectable 100 Unit(s) IV Push every other day  losartan 50 milliGRAM(s) Oral daily      Vital Signs Last 24 Hrs  T(C): 36.4 (11 Jan 2018 06:02), Max: 37.2 (10 Jorge 2018 14:16)  T(F): 97.6 (11 Jan 2018 06:02), Max: 98.9 (10 Jorge 2018 14:16)  HR: 65 (11 Jan 2018 06:02) (65 - 82)  BP: 148/78 (11 Jan 2018 06:02) (130/86 - 148/78)  BP(mean): --  RR: 17 (11 Jan 2018 06:02) (17 - 18)  SpO2: 95% (11 Jan 2018 06:02) (94% - 95%)  I&O's Detail    10 Jorge 2018 07:01  -  11 Jan 2018 07:00  --------------------------------------------------------  IN:    Oral Fluid: 120 mL  Total IN: 120 mL    OUT:    Drain: 100 mL  Total OUT: 100 mL    Total NET: 20 mL          General: NAD, resting comfortably in bed  C/V: NSR  Pulm: Nonlabored breathing, no respiratory distress  Abd: Soft, non-distended, non tender ,  granulating tissue around tissue fistulas   Extrem: WWP, no edema, SCDs in place        LABS:    01-11    139  |  102  |  32<H>  ----------------------------<  136<H>  4.2   |  26  |  0.64    Ca    9.6      11 Jan 2018 06:56

## 2018-01-11 NOTE — CHART NOTE - NSCHARTNOTEFT_GEN_A_CORE
Admitting Diagnosis:   Patient is a 57y old  Female who presents with a chief complaint of enterocutaneous fistula (24 Jul 2017 14:15)      PAST MEDICAL & SURGICAL HISTORY:  Reflux  Obesity  DVT (deep venous thrombosis): 2013 neck  Diverticulitis  Hypertension  Elective surgery: abodominal wall surgery  dec 2016  Elective surgery: NOV 2016 gastric bypass revision  Gastric bypass status for obesity: gastric sleeve, 6/2012  S/P breast biopsy: 2011, left  S/P colon resection: 2011  S/P knee surgery: repair 2009, 2011  right; left  Umbilical hernia: 2000  S/P appendectomy: 1975  S/P tonsillectomy: 1967      Current Nutrition Order:  NPO except ice chips and water  >> TPN via central venous line:  1752 TV (73ml/hr); 355g D, 104g AA, 50g 20% lipids (2123 kcal, 2g/kg IBW pro, GIR 3.14 based on most recent standing wt)    PO Intake: Good (%) [   ]  Fair (50-75%) [   ] Poor (<25%) [   ] N/A   Per note in Patient Care order- pt can 2 sucking candys/day. Pt was requesting 3 at time of assessment. Reviewed w/ pt purpose of limiting PO intake.    GI Issues: No reported GI distress at this time.    Pain: No C/o pain.    Skin Integrity: sacrum un-stageable PU; sacral abrasion; abd wound-->ostomy manager to LIWS  Continuous vac changes until ready for Sx    Labs:   01-11    139  |  102  |  32<H>  ----------------------------<  136<H>  4.2   |  26  |  0.64    Ca    9.6      11 Jan 2018 06:56      CAPILLARY BLOOD GLUCOSE      POCT Blood Glucose.: 114 mg/dL (11 Jan 2018 11:26)  POCT Blood Glucose.: 107 mg/dL (11 Jan 2018 05:42)  POCT Blood Glucose.: 130 mg/dL (10 Jorge 2018 23:54)  POCT Blood Glucose.: 104 mg/dL (10 Jorge 2018 17:24)      Medications:  MEDICATIONS  (STANDING):  bismuth subsalicylate Liquid 30 milliLiter(s) Oral daily  calcitriol Injectable 1 MICROGram(s) IV Push <User Schedule>  collagenase Ointment 1 Application(s) Topical daily  cyanocobalamin Injectable 1000 MICROGram(s) SubCutaneous every 7 days  dextrose 5%. 1000 milliLiter(s) (50 mL/Hr) IV Continuous <Continuous>  dextrose 50% Injectable 25 Gram(s) IV Push once  dextrose 50% Injectable 12.5 Gram(s) IV Push once  diazepam    Tablet 5 milliGRAM(s) Oral at bedtime  diphenhydrAMINE   Injectable 50 milliGRAM(s) IV Push every 6 hours  enoxaparin Injectable 40 milliGRAM(s) SubCutaneous two times a day  escitalopram 20 milliGRAM(s) Oral daily  gabapentin 100 milliGRAM(s) Oral two times a day  heparin  flush 100 Units/mL Injectable 100 Unit(s) IV Push every other day  heparin  flush 100 Units/mL Injectable 100 Unit(s) IV Push every other day  heparin  flush 100 Units/mL Injectable 100 Unit(s) IV Push every other day  insulin lispro (HumaLOG) corrective regimen sliding scale   SubCutaneous every 6 hours  lidocaine   Patch 1 Patch Transdermal daily  losartan 50 milliGRAM(s) Oral daily  nystatin Powder 1 Application(s) Topical two times a day  octreotide  Injectable 450 MICROGram(s) IV Push daily  ondansetron   Disintegrating Tablet 8 milliGRAM(s) Oral once  oxybutynin 10 milliGRAM(s) Oral two times a day  pantoprazole  Injectable 40 milliGRAM(s) IV Push daily  Parenteral Nutrition - Adult 1 Each (73 mL/Hr) TPN Continuous <Continuous>  Parenteral Nutrition - Adult 1 Each (73 mL/Hr) TPN Continuous <Continuous>  sodium chloride 0.9% lock flush 20 milliLiter(s) IV Push once    MEDICATIONS  (PRN):  acetaminophen   Tablet. 325 milliGRAM(s) Oral every 4 hours PRN Moderate Pain (4 - 6)  ondansetron Injectable 4 milliGRAM(s) IV Push every 6 hours PRN Nausea and/or Vomiting  sodium chloride 0.9% lock flush 10 milliLiter(s) IV Push every 1 hour PRN After each medication administration  sodium chloride 0.9% lock flush 10 milliLiter(s) IV Push every 12 hours PRN Lumen of catheter NOT used      Weight:  1755.1 (1/8/18)  172.5 (12/19)  171.5lb (12/14)  167lb (11/13)  164lb (10/17)  219lb (8/1)    new standing wt taken 1/8 is 175.1lb >> Indicates 8lb wt gain since admission.    Weight Change:   03/2016: 89kg  02/2017: 74kg   07/2017: 76kg  11/2017: 76kg   1/2018: 79.5  **UPDATE: pt weighed during PT today: standing wt is 175.1lb      Estimated energy needs:   Estimated energy needs using 52 kg IBW:  increased needs per wound and pressure ulcer healing. needs calculated based on TPN as primary route of nutrition.  30-35 kcal/kg (6320-3795 kcal).   1.8-2 g/kg ( g protein).  30-35 mL/kg (4326-2251 mL fluid).     Subjective:   Pt seen resting comfortably in bed. No GI distress. Noted pt has had 8lb wt gain since November admission. Most recent wt is 175.1lb. New pre-alb and triglyceride labs WNL. Protein now 2g/kg IBW. RD to follow per policy.   BG trending 120-150. Consider decreasing Dex to 270g if wt continues to trend up and pre-alb and triglycerides are stable.     Previous Nutrition Diagnosis: Increased nutrient needs RT increased demand for nutrients AEB wound and pressure ulcer healing   Active [  X]  Resolved [   ]    If resolved, new PES: N/A    Goal: Pt to meet >75% EER via tolerated route    Recommendations:  1) Continue w/ current TPN order as tolerated and assess feasibility of other routes w/wound healing  2) Appreciate order new pre-alb labs  3) Monitor triglycerides & adjust lipids per MD discretion -Please obtain new lab values.  4) Please continue to take standing weight for trending purposes  5) Consider OsCal tablet crushed w/ water/italian ice     Education: Understands meeting nutrient needs via TPN.    Risk Level: High [  X ] Moderate [   ] Low [   ].

## 2018-01-12 LAB
ALBUMIN SERPL ELPH-MCNC: 3.1 G/DL — LOW (ref 3.3–5)
ALP SERPL-CCNC: 250 U/L — HIGH (ref 40–120)
ALT FLD-CCNC: 64 U/L — HIGH (ref 10–45)
ANION GAP SERPL CALC-SCNC: 14 MMOL/L — SIGNIFICANT CHANGE UP (ref 5–17)
AST SERPL-CCNC: 52 U/L — HIGH (ref 10–40)
BILIRUB SERPL-MCNC: 0.5 MG/DL — SIGNIFICANT CHANGE UP (ref 0.2–1.2)
BUN SERPL-MCNC: 37 MG/DL — HIGH (ref 7–23)
CALCIUM SERPL-MCNC: 9.6 MG/DL — SIGNIFICANT CHANGE UP (ref 8.4–10.5)
CHLORIDE SERPL-SCNC: 103 MMOL/L — SIGNIFICANT CHANGE UP (ref 96–108)
CO2 SERPL-SCNC: 24 MMOL/L — SIGNIFICANT CHANGE UP (ref 22–31)
CREAT SERPL-MCNC: 0.68 MG/DL — SIGNIFICANT CHANGE UP (ref 0.5–1.3)
GLUCOSE BLDC GLUCOMTR-MCNC: 101 MG/DL — HIGH (ref 70–99)
GLUCOSE BLDC GLUCOMTR-MCNC: 105 MG/DL — HIGH (ref 70–99)
GLUCOSE BLDC GLUCOMTR-MCNC: 116 MG/DL — HIGH (ref 70–99)
GLUCOSE SERPL-MCNC: 128 MG/DL — HIGH (ref 70–99)
POTASSIUM SERPL-MCNC: 4.9 MMOL/L — SIGNIFICANT CHANGE UP (ref 3.5–5.3)
POTASSIUM SERPL-SCNC: 4.9 MMOL/L — SIGNIFICANT CHANGE UP (ref 3.5–5.3)
PREALB SERPL-MCNC: 38 MG/DL — SIGNIFICANT CHANGE UP (ref 20–40)
PROT SERPL-MCNC: 7.3 G/DL — SIGNIFICANT CHANGE UP (ref 6–8.3)
SODIUM SERPL-SCNC: 141 MMOL/L — SIGNIFICANT CHANGE UP (ref 135–145)
TRIGL SERPL-MCNC: 111 MG/DL — SIGNIFICANT CHANGE UP (ref 10–149)

## 2018-01-12 RX ORDER — ELECTROLYTE SOLUTION,INJ
1 VIAL (ML) INTRAVENOUS
Qty: 0 | Refills: 0 | Status: DISCONTINUED | OUTPATIENT
Start: 2018-01-12 | End: 2018-01-12

## 2018-01-12 RX ORDER — I.V. FAT EMULSION 20 G/100ML
0.51 EMULSION INTRAVENOUS
Qty: 50 | Refills: 0 | Status: DISCONTINUED | OUTPATIENT
Start: 2018-01-12 | End: 2018-01-12

## 2018-01-12 RX ADMIN — Medication 325 MILLIGRAM(S): at 12:24

## 2018-01-12 RX ADMIN — I.V. FAT EMULSION 20.83 GM/KG/DAY: 20 EMULSION INTRAVENOUS at 21:13

## 2018-01-12 RX ADMIN — Medication 50 MILLIGRAM(S): at 23:45

## 2018-01-12 RX ADMIN — OCTREOTIDE ACETATE 400 MICROGRAM(S): 200 INJECTION, SOLUTION INTRAVENOUS; SUBCUTANEOUS at 11:06

## 2018-01-12 RX ADMIN — ENOXAPARIN SODIUM 40 MILLIGRAM(S): 100 INJECTION SUBCUTANEOUS at 05:03

## 2018-01-12 RX ADMIN — LOSARTAN POTASSIUM 50 MILLIGRAM(S): 100 TABLET, FILM COATED ORAL at 05:04

## 2018-01-12 RX ADMIN — ONDANSETRON 4 MILLIGRAM(S): 8 TABLET, FILM COATED ORAL at 11:07

## 2018-01-12 RX ADMIN — Medication 1 APPLICATION(S): at 05:05

## 2018-01-12 RX ADMIN — GABAPENTIN 100 MILLIGRAM(S): 400 CAPSULE ORAL at 05:00

## 2018-01-12 RX ADMIN — Medication 50 MILLIGRAM(S): at 17:02

## 2018-01-12 RX ADMIN — Medication 325 MILLIGRAM(S): at 17:03

## 2018-01-12 RX ADMIN — Medication 325 MILLIGRAM(S): at 21:13

## 2018-01-12 RX ADMIN — ESCITALOPRAM OXALATE 20 MILLIGRAM(S): 10 TABLET, FILM COATED ORAL at 21:13

## 2018-01-12 RX ADMIN — ENOXAPARIN SODIUM 40 MILLIGRAM(S): 100 INJECTION SUBCUTANEOUS at 17:03

## 2018-01-12 RX ADMIN — CALCITRIOL 1 MICROGRAM(S): 0.5 CAPSULE ORAL at 11:05

## 2018-01-12 RX ADMIN — Medication 50 MILLIGRAM(S): at 11:07

## 2018-01-12 RX ADMIN — Medication 325 MILLIGRAM(S): at 18:12

## 2018-01-12 RX ADMIN — Medication 100 UNIT(S): at 11:08

## 2018-01-12 RX ADMIN — PANTOPRAZOLE SODIUM 40 MILLIGRAM(S): 20 TABLET, DELAYED RELEASE ORAL at 05:03

## 2018-01-12 RX ADMIN — Medication 325 MILLIGRAM(S): at 00:12

## 2018-01-12 RX ADMIN — NYSTATIN CREAM 1 APPLICATION(S): 100000 CREAM TOPICAL at 05:05

## 2018-01-12 RX ADMIN — Medication 100 UNIT(S): at 11:07

## 2018-01-12 RX ADMIN — Medication 10 MILLIGRAM(S): at 17:03

## 2018-01-12 RX ADMIN — Medication 325 MILLIGRAM(S): at 11:08

## 2018-01-12 RX ADMIN — Medication 50 MILLIGRAM(S): at 05:03

## 2018-01-12 RX ADMIN — ONDANSETRON 4 MILLIGRAM(S): 8 TABLET, FILM COATED ORAL at 17:02

## 2018-01-12 RX ADMIN — NYSTATIN CREAM 1 APPLICATION(S): 100000 CREAM TOPICAL at 17:04

## 2018-01-12 RX ADMIN — Medication 10 MILLIGRAM(S): at 05:03

## 2018-01-12 RX ADMIN — Medication 5 MILLIGRAM(S): at 21:13

## 2018-01-12 RX ADMIN — GABAPENTIN 100 MILLIGRAM(S): 400 CAPSULE ORAL at 17:03

## 2018-01-12 RX ADMIN — ONDANSETRON 4 MILLIGRAM(S): 8 TABLET, FILM COATED ORAL at 23:35

## 2018-01-12 NOTE — PROGRESS NOTE ADULT - SUBJECTIVE AND OBJECTIVE BOX
O/N: BRAYDEN. VSS  1/11: BRAYDEN, VSS O/N: BRAYDEN. VSS  1/11: BRAYDEN, VSS  INTERVAL HPI/OVERNIGHT EVENTS:      SUBJECTIVE: Pt seen and examined at bedside. No acute complaints.   Flatus: [x ] YES [ ] NO             Bowel Movement: [x ] YES [ ] NO  Pain (0-10):            Pain Control Adequate: [ x] YES [ ] NO  Nausea: [ ] YES [x ] NO            Vomiting: [ ] YES [x ] NO  Diarrhea: [ ] YES [ x] NO         Constipation: [ ] YES [x ] NO     Chest Pain: [ ] YES [x ] NO    SOB:  [ ] YES [x ] NO    MEDICATIONS  (STANDING):  bismuth subsalicylate Liquid 30 milliLiter(s) Oral daily  calcitriol Injectable 1 MICROGram(s) IV Push <User Schedule>  collagenase Ointment 1 Application(s) Topical daily  cyanocobalamin Injectable 1000 MICROGram(s) SubCutaneous every 7 days  dextrose 5%. 1000 milliLiter(s) (50 mL/Hr) IV Continuous <Continuous>  dextrose 50% Injectable 25 Gram(s) IV Push once  dextrose 50% Injectable 12.5 Gram(s) IV Push once  diazepam    Tablet 5 milliGRAM(s) Oral at bedtime  diphenhydrAMINE   Injectable 50 milliGRAM(s) IV Push every 6 hours  enoxaparin Injectable 40 milliGRAM(s) SubCutaneous two times a day  escitalopram 20 milliGRAM(s) Oral daily  gabapentin 100 milliGRAM(s) Oral two times a day  heparin  flush 100 Units/mL Injectable 100 Unit(s) IV Push every other day  heparin  flush 100 Units/mL Injectable 100 Unit(s) IV Push every other day  heparin  flush 100 Units/mL Injectable 100 Unit(s) IV Push every other day  insulin lispro (HumaLOG) corrective regimen sliding scale   SubCutaneous every 6 hours  lidocaine   Patch 1 Patch Transdermal daily  losartan 50 milliGRAM(s) Oral daily  nystatin Powder 1 Application(s) Topical two times a day  octreotide  Injectable 450 MICROGram(s) IV Push daily  ondansetron   Disintegrating Tablet 8 milliGRAM(s) Oral once  oxybutynin 10 milliGRAM(s) Oral two times a day  pantoprazole  Injectable 40 milliGRAM(s) IV Push daily  Parenteral Nutrition - Adult 1 Each (73 mL/Hr) TPN Continuous <Continuous>  sodium chloride 0.9% lock flush 20 milliLiter(s) IV Push once    MEDICATIONS  (PRN):  acetaminophen   Tablet. 325 milliGRAM(s) Oral every 4 hours PRN Moderate Pain (4 - 6)  ondansetron Injectable 4 milliGRAM(s) IV Push every 6 hours PRN Nausea and/or Vomiting  sodium chloride 0.9% lock flush 10 milliLiter(s) IV Push every 1 hour PRN After each medication administration  sodium chloride 0.9% lock flush 10 milliLiter(s) IV Push every 12 hours PRN Lumen of catheter NOT used      Vital Signs Last 24 Hrs  T(C): 36 (12 Jan 2018 06:07), Max: 37.2 (11 Jan 2018 17:40)  T(F): 96.8 (12 Jan 2018 06:07), Max: 99 (11 Jan 2018 17:40)  HR: 63 (12 Jan 2018 06:07) (63 - 80)  BP: 118/81 (12 Jan 2018 06:07) (108/72 - 123/79)  BP(mean): --  RR: 16 (12 Jan 2018 06:07) (16 - 16)  SpO2: 95% (12 Jan 2018 06:07) (95% - 98%)    PHYSICAL EXAM:      General: NAD, resting comfortably in bed  C/V: NSR  Pulm: Nonlabored breathing, no respiratory distress  Abd: Soft, non-distended, non tender ,  granulating tissue around tissue fistulas   Extrem: WWP, no edema, SCDs in place                I&O's Detail    11 Jan 2018 07:01  -  12 Jan 2018 07:00  --------------------------------------------------------  IN:    TPN (Total Parenteral Nutrition): 1679 mL  Total IN: 1679 mL    OUT:    Drain: 2200 mL  Total OUT: 2200 mL    Total NET: -521 mL          LABS:    01-11    139  |  102  |  32<H>  ----------------------------<  136<H>  4.2   |  26  |  0.64    Ca    9.6      11 Jan 2018 06:56            RADIOLOGY & ADDITIONAL STUDIES:

## 2018-01-12 NOTE — PROGRESS NOTE ADULT - SUBJECTIVE AND OBJECTIVE BOX
On interval followup, the left subclavian venous catheter site is clean, dry. non-inflamed and non-tender. Notes, cultures and data were seen. The biopatch dressing change was discussed with the nurse.

## 2018-01-12 NOTE — PROGRESS NOTE ADULT - ASSESSMENT
57 F s/p  SBR, fistula resection, esophagojejunostomy revision w/ post-op course complicated by RTOR for distal anastomosis breakdown, ATN, acute respiratory failure, & septic shock, MRSA bacteremia which resolved. Currently for wound care management and surgical planning.    1. Enterocutanous fistula  - wound care- ostomy manager to [facility]  - NPO/ TPN/2 sucking candy/day  - AM labs, weekly albumin, pre-albumin and triglyceride (every friday).      2. Fever- unclear eitology  - F/u ID recs Vanc/ Zosyn (1/3-1/5)  - F/U blood CTX NG x1 d    3. Hx of DVT   - Lovenox    4. Depression/ Anxiety   - escitalopram  - diazepam  - Gabapentin     5. GERD  - Protonix    6. PPX  OOB/ PT/ Is 57 F s/p  SBR, fistula resection, esophagojejunostomy revision w/ post-op course complicated by RTOR for distal anastomosis breakdown, ATN, acute respiratory failure, & septic shock, MRSA bacteremia which resolved. Currently for wound care management and surgical planning.    1. Enterocutanous fistula  - wound care- ostomy manager to [facility]  - NPO/ TPN/2 sucking candy/day  - AM labs, weekly albumin, pre-albumin and triglyceride (every friday).      2. Hx of DVT   - Lovenox    3. Depression/ Anxiety   - escitalopram  - diazepam  - Gabapentin     4. GERD  - Protonix    5. PPX  OOB/ PT/ Is

## 2018-01-13 LAB
ANION GAP SERPL CALC-SCNC: 8 MMOL/L — SIGNIFICANT CHANGE UP (ref 5–17)
BUN SERPL-MCNC: 39 MG/DL — HIGH (ref 7–23)
CALCIUM SERPL-MCNC: 9.6 MG/DL — SIGNIFICANT CHANGE UP (ref 8.4–10.5)
CHLORIDE SERPL-SCNC: 105 MMOL/L — SIGNIFICANT CHANGE UP (ref 96–108)
CO2 SERPL-SCNC: 28 MMOL/L — SIGNIFICANT CHANGE UP (ref 22–31)
CREAT SERPL-MCNC: 0.62 MG/DL — SIGNIFICANT CHANGE UP (ref 0.5–1.3)
GLUCOSE BLDC GLUCOMTR-MCNC: 107 MG/DL — HIGH (ref 70–99)
GLUCOSE BLDC GLUCOMTR-MCNC: 109 MG/DL — HIGH (ref 70–99)
GLUCOSE BLDC GLUCOMTR-MCNC: 114 MG/DL — HIGH (ref 70–99)
GLUCOSE BLDC GLUCOMTR-MCNC: 92 MG/DL — SIGNIFICANT CHANGE UP (ref 70–99)
GLUCOSE SERPL-MCNC: 123 MG/DL — HIGH (ref 70–99)
MAGNESIUM SERPL-MCNC: 2.2 MG/DL — SIGNIFICANT CHANGE UP (ref 1.6–2.6)
PHOSPHATE SERPL-MCNC: 3.4 MG/DL — SIGNIFICANT CHANGE UP (ref 2.5–4.5)
POTASSIUM SERPL-MCNC: 4.4 MMOL/L — SIGNIFICANT CHANGE UP (ref 3.5–5.3)
POTASSIUM SERPL-SCNC: 4.4 MMOL/L — SIGNIFICANT CHANGE UP (ref 3.5–5.3)
SODIUM SERPL-SCNC: 141 MMOL/L — SIGNIFICANT CHANGE UP (ref 135–145)

## 2018-01-13 RX ORDER — ELECTROLYTE SOLUTION,INJ
1 VIAL (ML) INTRAVENOUS
Qty: 0 | Refills: 0 | Status: DISCONTINUED | OUTPATIENT
Start: 2018-01-13 | End: 2018-01-13

## 2018-01-13 RX ADMIN — OCTREOTIDE ACETATE 450 MICROGRAM(S): 200 INJECTION, SOLUTION INTRAVENOUS; SUBCUTANEOUS at 11:25

## 2018-01-13 RX ADMIN — ONDANSETRON 4 MILLIGRAM(S): 8 TABLET, FILM COATED ORAL at 21:17

## 2018-01-13 RX ADMIN — GABAPENTIN 100 MILLIGRAM(S): 400 CAPSULE ORAL at 17:08

## 2018-01-13 RX ADMIN — LOSARTAN POTASSIUM 50 MILLIGRAM(S): 100 TABLET, FILM COATED ORAL at 05:47

## 2018-01-13 RX ADMIN — GABAPENTIN 100 MILLIGRAM(S): 400 CAPSULE ORAL at 05:47

## 2018-01-13 RX ADMIN — Medication 50 MILLIGRAM(S): at 23:45

## 2018-01-13 RX ADMIN — NYSTATIN CREAM 1 APPLICATION(S): 100000 CREAM TOPICAL at 05:55

## 2018-01-13 RX ADMIN — Medication 1 EACH: at 17:07

## 2018-01-13 RX ADMIN — Medication 50 MILLIGRAM(S): at 17:08

## 2018-01-13 RX ADMIN — Medication 1 APPLICATION(S): at 05:55

## 2018-01-13 RX ADMIN — Medication 10 MILLIGRAM(S): at 05:46

## 2018-01-13 RX ADMIN — ENOXAPARIN SODIUM 40 MILLIGRAM(S): 100 INJECTION SUBCUTANEOUS at 05:55

## 2018-01-13 RX ADMIN — ESCITALOPRAM OXALATE 20 MILLIGRAM(S): 10 TABLET, FILM COATED ORAL at 21:17

## 2018-01-13 RX ADMIN — PANTOPRAZOLE SODIUM 40 MILLIGRAM(S): 20 TABLET, DELAYED RELEASE ORAL at 05:54

## 2018-01-13 RX ADMIN — Medication 5 MILLIGRAM(S): at 21:17

## 2018-01-13 RX ADMIN — NYSTATIN CREAM 1 APPLICATION(S): 100000 CREAM TOPICAL at 18:17

## 2018-01-13 RX ADMIN — Medication 50 MILLIGRAM(S): at 05:54

## 2018-01-13 RX ADMIN — ENOXAPARIN SODIUM 40 MILLIGRAM(S): 100 INJECTION SUBCUTANEOUS at 17:08

## 2018-01-13 RX ADMIN — Medication 325 MILLIGRAM(S): at 22:17

## 2018-01-13 RX ADMIN — Medication 325 MILLIGRAM(S): at 21:17

## 2018-01-13 RX ADMIN — Medication 10 MILLIGRAM(S): at 17:08

## 2018-01-13 RX ADMIN — Medication 50 MILLIGRAM(S): at 11:25

## 2018-01-13 NOTE — PROGRESS NOTE ADULT - SUBJECTIVE AND OBJECTIVE BOX
O/N: BRAYDEN. VSS  1/14 BRAYDEN VSS O/N: BRAYDEN. VSS  1/14 BRAYDEN VSS    SUBJECTIVE: Patient examined bedside denies incisional pain.  Flatus: [X] YES [] NO             Bowel Movement: [ X] YES [ ] NO  Pain (0-10):            Pain Control Adequate: [ X] YES [ ] NO  Nausea: [ ] YES [X ] NO            Vomiting: [ ] YES [X ] NO  Diarrhea: [ ] YES [ X] NO         Constipation: [ ] YES [X ] NO     Chest Pain: [ ] YES [X ] NO    SOB:  [ ] YES [X ] NO    enoxaparin Injectable 40 milliGRAM(s) SubCutaneous two times a day  heparin  flush 100 Units/mL Injectable 100 Unit(s) IV Push every other day  heparin  flush 100 Units/mL Injectable 100 Unit(s) IV Push every other day  heparin  flush 100 Units/mL Injectable 100 Unit(s) IV Push every other day  losartan 50 milliGRAM(s) Oral daily      Vital Signs Last 24 Hrs  T(C): 37 (13 Jan 2018 05:51), Max: 37.2 (12 Jan 2018 21:00)  T(F): 98.6 (13 Jan 2018 05:51), Max: 99 (12 Jan 2018 21:00)  HR: 62 (13 Jan 2018 05:51) (62 - 84)  BP: 150/85 (13 Jan 2018 05:51) (113/72 - 150/85)  BP(mean): --  RR: 16 (13 Jan 2018 05:51) (16 - 16)  SpO2: 95% (13 Jan 2018 05:51) (95% - 96%)  I&O's Detail      General: NAD, resting comfortably in bed  C/V: NSR  Pulm: Nonlabored breathing, no respiratory distress  Abd: soft, non tender , non distended , granulation tissue around fistula site   Extrem: WWP, no edema, SCDs in place  Drains:  Yu:      LABS:    01-13    141  |  105  |  39<H>  ----------------------------<  123<H>  4.4   |  28  |  0.62    Ca    9.6      13 Jan 2018 07:27  Phos  3.4     01-13  Mg     2.2     01-13    TPro  7.3  /  Alb  3.1<L>  /  TBili  0.5  /  DBili  x   /  AST  52<H>  /  ALT  64<H>  /  AlkPhos  250<H>  01-12

## 2018-01-13 NOTE — PROGRESS NOTE ADULT - ASSESSMENT
57 F s/p  SBR, fistula resection, esophagojejunostomy revision w/ post-op course complicated by RTOR for distal anastomosis breakdown, ATN, acute respiratory failure, & septic shock, MRSA bacteremia which resolved. Currently for wound care management and surgical planning.    1. Enterocutanous fistula  - wound care- ostomy manager to [facility]  - NPO/ TPN/2 sucking candy/day  - AM labs, weekly albumin, pre-albumin and triglyceride (every friday).    - Gi to scope 1/17    2. Hx of DVT   - Lovenox    3. Depression/ Anxiety   - escitalopram  - diazepam  - Gabapentin     4. GERD  - Protonix    5. PPX  OOB/ PT/ Is

## 2018-01-14 LAB
ANION GAP SERPL CALC-SCNC: 11 MMOL/L — SIGNIFICANT CHANGE UP (ref 5–17)
BUN SERPL-MCNC: 35 MG/DL — HIGH (ref 7–23)
CALCIUM SERPL-MCNC: 9.8 MG/DL — SIGNIFICANT CHANGE UP (ref 8.4–10.5)
CHLORIDE SERPL-SCNC: 103 MMOL/L — SIGNIFICANT CHANGE UP (ref 96–108)
CO2 SERPL-SCNC: 28 MMOL/L — SIGNIFICANT CHANGE UP (ref 22–31)
CREAT SERPL-MCNC: 0.68 MG/DL — SIGNIFICANT CHANGE UP (ref 0.5–1.3)
GLUCOSE BLDC GLUCOMTR-MCNC: 104 MG/DL — HIGH (ref 70–99)
GLUCOSE BLDC GLUCOMTR-MCNC: 117 MG/DL — HIGH (ref 70–99)
GLUCOSE BLDC GLUCOMTR-MCNC: 95 MG/DL — SIGNIFICANT CHANGE UP (ref 70–99)
GLUCOSE SERPL-MCNC: 129 MG/DL — HIGH (ref 70–99)
MAGNESIUM SERPL-MCNC: 2.1 MG/DL — SIGNIFICANT CHANGE UP (ref 1.6–2.6)
PHOSPHATE SERPL-MCNC: 3.4 MG/DL — SIGNIFICANT CHANGE UP (ref 2.5–4.5)
POTASSIUM SERPL-MCNC: 4.4 MMOL/L — SIGNIFICANT CHANGE UP (ref 3.5–5.3)
POTASSIUM SERPL-SCNC: 4.4 MMOL/L — SIGNIFICANT CHANGE UP (ref 3.5–5.3)
SODIUM SERPL-SCNC: 142 MMOL/L — SIGNIFICANT CHANGE UP (ref 135–145)

## 2018-01-14 RX ORDER — ACETAMINOPHEN 500 MG
1000 TABLET ORAL ONCE
Qty: 0 | Refills: 0 | Status: COMPLETED | OUTPATIENT
Start: 2018-01-14 | End: 2018-01-15

## 2018-01-14 RX ORDER — ELECTROLYTE SOLUTION,INJ
1 VIAL (ML) INTRAVENOUS
Qty: 0 | Refills: 0 | Status: DISCONTINUED | OUTPATIENT
Start: 2018-01-14 | End: 2018-01-14

## 2018-01-14 RX ADMIN — Medication 10 MILLIGRAM(S): at 05:45

## 2018-01-14 RX ADMIN — Medication 100 UNIT(S): at 13:49

## 2018-01-14 RX ADMIN — ONDANSETRON 4 MILLIGRAM(S): 8 TABLET, FILM COATED ORAL at 13:44

## 2018-01-14 RX ADMIN — Medication 10 MILLIGRAM(S): at 18:38

## 2018-01-14 RX ADMIN — ENOXAPARIN SODIUM 40 MILLIGRAM(S): 100 INJECTION SUBCUTANEOUS at 18:37

## 2018-01-14 RX ADMIN — Medication 325 MILLIGRAM(S): at 23:34

## 2018-01-14 RX ADMIN — NYSTATIN CREAM 1 APPLICATION(S): 100000 CREAM TOPICAL at 05:46

## 2018-01-14 RX ADMIN — Medication 325 MILLIGRAM(S): at 13:44

## 2018-01-14 RX ADMIN — Medication 1 EACH: at 18:37

## 2018-01-14 RX ADMIN — PANTOPRAZOLE SODIUM 40 MILLIGRAM(S): 20 TABLET, DELAYED RELEASE ORAL at 06:00

## 2018-01-14 RX ADMIN — ONDANSETRON 4 MILLIGRAM(S): 8 TABLET, FILM COATED ORAL at 18:56

## 2018-01-14 RX ADMIN — Medication 50 MILLIGRAM(S): at 06:00

## 2018-01-14 RX ADMIN — GABAPENTIN 100 MILLIGRAM(S): 400 CAPSULE ORAL at 05:44

## 2018-01-14 RX ADMIN — GABAPENTIN 100 MILLIGRAM(S): 400 CAPSULE ORAL at 18:37

## 2018-01-14 RX ADMIN — Medication 325 MILLIGRAM(S): at 13:50

## 2018-01-14 RX ADMIN — ENOXAPARIN SODIUM 40 MILLIGRAM(S): 100 INJECTION SUBCUTANEOUS at 05:44

## 2018-01-14 RX ADMIN — NYSTATIN CREAM 1 APPLICATION(S): 100000 CREAM TOPICAL at 18:39

## 2018-01-14 RX ADMIN — Medication 325 MILLIGRAM(S): at 06:44

## 2018-01-14 RX ADMIN — Medication 325 MILLIGRAM(S): at 18:57

## 2018-01-14 RX ADMIN — Medication 50 MILLIGRAM(S): at 13:32

## 2018-01-14 RX ADMIN — OCTREOTIDE ACETATE 450 MICROGRAM(S): 200 INJECTION, SOLUTION INTRAVENOUS; SUBCUTANEOUS at 13:31

## 2018-01-14 RX ADMIN — Medication 325 MILLIGRAM(S): at 05:44

## 2018-01-14 RX ADMIN — ESCITALOPRAM OXALATE 20 MILLIGRAM(S): 10 TABLET, FILM COATED ORAL at 22:14

## 2018-01-14 RX ADMIN — Medication 5 MILLIGRAM(S): at 22:13

## 2018-01-14 RX ADMIN — Medication 50 MILLIGRAM(S): at 18:37

## 2018-01-14 RX ADMIN — LOSARTAN POTASSIUM 50 MILLIGRAM(S): 100 TABLET, FILM COATED ORAL at 05:44

## 2018-01-14 NOTE — PROGRESS NOTE ADULT - SUBJECTIVE AND OBJECTIVE BOX
O/N: BRAYDEN, VSS  1/13: BRAYDEN , VSS O/N: BRAYDEN, VSS  1/13: BRAYDEN , VSS        SUBJECTIVE: denies any complaints      MEDICATIONS  (STANDING):  bismuth subsalicylate Liquid 30 milliLiter(s) Oral daily  calcitriol Injectable 1 MICROGram(s) IV Push <User Schedule>  collagenase Ointment 1 Application(s) Topical daily  cyanocobalamin Injectable 1000 MICROGram(s) SubCutaneous every 7 days  dextrose 5%. 1000 milliLiter(s) (50 mL/Hr) IV Continuous <Continuous>  dextrose 50% Injectable 25 Gram(s) IV Push once  dextrose 50% Injectable 12.5 Gram(s) IV Push once  diazepam    Tablet 5 milliGRAM(s) Oral at bedtime  diphenhydrAMINE   Injectable 50 milliGRAM(s) IV Push every 6 hours  enoxaparin Injectable 40 milliGRAM(s) SubCutaneous two times a day  escitalopram 20 milliGRAM(s) Oral daily  gabapentin 100 milliGRAM(s) Oral two times a day  heparin  flush 100 Units/mL Injectable 100 Unit(s) IV Push every other day  heparin  flush 100 Units/mL Injectable 100 Unit(s) IV Push every other day  heparin  flush 100 Units/mL Injectable 100 Unit(s) IV Push every other day  insulin lispro (HumaLOG) corrective regimen sliding scale   SubCutaneous every 6 hours  lidocaine   Patch 1 Patch Transdermal daily  losartan 50 milliGRAM(s) Oral daily  nystatin Powder 1 Application(s) Topical two times a day  octreotide  Injectable 450 MICROGram(s) IV Push daily  ondansetron   Disintegrating Tablet 8 milliGRAM(s) Oral once  oxybutynin 10 milliGRAM(s) Oral two times a day  pantoprazole  Injectable 40 milliGRAM(s) IV Push daily  Parenteral Nutrition - Adult 1 Each (73 mL/Hr) TPN Continuous <Continuous>  sodium chloride 0.9% lock flush 20 milliLiter(s) IV Push once    MEDICATIONS  (PRN):  acetaminophen   Tablet. 325 milliGRAM(s) Oral every 4 hours PRN Moderate Pain (4 - 6)  ondansetron Injectable 4 milliGRAM(s) IV Push every 6 hours PRN Nausea and/or Vomiting  sodium chloride 0.9% lock flush 10 milliLiter(s) IV Push every 1 hour PRN After each medication administration  sodium chloride 0.9% lock flush 10 milliLiter(s) IV Push every 12 hours PRN Lumen of catheter NOT used      Vital Signs Last 24 Hrs  T(C): 36.7 (14 Jan 2018 06:03), Max: 36.7 (13 Jan 2018 09:17)  T(F): 98 (14 Jan 2018 06:03), Max: 98 (13 Jan 2018 09:17)  HR: 59 (14 Jan 2018 06:03) (59 - 72)  BP: 134/87 (14 Jan 2018 06:03) (112/73 - 134/87)  BP(mean): --  RR: 16 (14 Jan 2018 06:03) (16 - 16)  SpO2: 94% (14 Jan 2018 06:03) (94% - 99%)    PHYSICAL EXAM:      Constitutional: A&Ox3      Respiratory: non labored breathing, no respiratory distress    Cardiovascular: NSR, RRR    Gastrointestinal: Soft ND,NT                 Incision:  granulation tissue around fistula site    Genitourinary: voiding    Extremities: (-) edema                  I&O's Detail    13 Jan 2018 07:01  -  14 Jan 2018 07:00  --------------------------------------------------------  IN:    TPN (Total Parenteral Nutrition): 657 mL  Total IN: 657 mL    OUT:    Drain: 450 mL  Total OUT: 450 mL    Total NET: 207 mL      14 Jan 2018 07:01  -  14 Jan 2018 08:22  --------------------------------------------------------  IN:    TPN (Total Parenteral Nutrition): 73 mL  Total IN: 73 mL    OUT:    Drain: 100 mL  Total OUT: 100 mL    Total NET: -27 mL          LABS:    01-13    141  |  105  |  39<H>  ----------------------------<  123<H>  4.4   |  28  |  0.62    Ca    9.6      13 Jan 2018 07:27  Phos  3.4     01-13  Mg     2.2     01-13            RADIOLOGY & ADDITIONAL STUDIES:

## 2018-01-15 LAB
ANION GAP SERPL CALC-SCNC: 9 MMOL/L — SIGNIFICANT CHANGE UP (ref 5–17)
BUN SERPL-MCNC: 33 MG/DL — HIGH (ref 7–23)
CALCIUM SERPL-MCNC: 9.6 MG/DL — SIGNIFICANT CHANGE UP (ref 8.4–10.5)
CHLORIDE SERPL-SCNC: 102 MMOL/L — SIGNIFICANT CHANGE UP (ref 96–108)
CO2 SERPL-SCNC: 30 MMOL/L — SIGNIFICANT CHANGE UP (ref 22–31)
CREAT SERPL-MCNC: 0.63 MG/DL — SIGNIFICANT CHANGE UP (ref 0.5–1.3)
GLUCOSE BLDC GLUCOMTR-MCNC: 112 MG/DL — HIGH (ref 70–99)
GLUCOSE BLDC GLUCOMTR-MCNC: 113 MG/DL — HIGH (ref 70–99)
GLUCOSE BLDC GLUCOMTR-MCNC: 124 MG/DL — HIGH (ref 70–99)
GLUCOSE BLDC GLUCOMTR-MCNC: 125 MG/DL — HIGH (ref 70–99)
GLUCOSE BLDC GLUCOMTR-MCNC: 94 MG/DL — SIGNIFICANT CHANGE UP (ref 70–99)
GLUCOSE SERPL-MCNC: 123 MG/DL — HIGH (ref 70–99)
MAGNESIUM SERPL-MCNC: 2 MG/DL — SIGNIFICANT CHANGE UP (ref 1.6–2.6)
PHOSPHATE SERPL-MCNC: 3.4 MG/DL — SIGNIFICANT CHANGE UP (ref 2.5–4.5)
POTASSIUM SERPL-MCNC: 4.5 MMOL/L — SIGNIFICANT CHANGE UP (ref 3.5–5.3)
POTASSIUM SERPL-SCNC: 4.5 MMOL/L — SIGNIFICANT CHANGE UP (ref 3.5–5.3)
SODIUM SERPL-SCNC: 141 MMOL/L — SIGNIFICANT CHANGE UP (ref 135–145)

## 2018-01-15 RX ORDER — I.V. FAT EMULSION 20 G/100ML
50 EMULSION INTRAVENOUS ONCE
Qty: 0 | Refills: 0 | Status: COMPLETED | OUTPATIENT
Start: 2018-01-15 | End: 2018-01-15

## 2018-01-15 RX ORDER — ACETAMINOPHEN 500 MG
1000 TABLET ORAL ONCE
Qty: 0 | Refills: 0 | Status: COMPLETED | OUTPATIENT
Start: 2018-01-15 | End: 2018-01-15

## 2018-01-15 RX ORDER — ELECTROLYTE SOLUTION,INJ
1 VIAL (ML) INTRAVENOUS
Qty: 0 | Refills: 0 | Status: DISCONTINUED | OUTPATIENT
Start: 2018-01-15 | End: 2018-01-15

## 2018-01-15 RX ADMIN — Medication 1000 MILLIGRAM(S): at 17:30

## 2018-01-15 RX ADMIN — OCTREOTIDE ACETATE 450 MICROGRAM(S): 200 INJECTION, SOLUTION INTRAVENOUS; SUBCUTANEOUS at 12:00

## 2018-01-15 RX ADMIN — Medication 50 MILLIGRAM(S): at 00:34

## 2018-01-15 RX ADMIN — Medication 50 MILLIGRAM(S): at 06:30

## 2018-01-15 RX ADMIN — LOSARTAN POTASSIUM 50 MILLIGRAM(S): 100 TABLET, FILM COATED ORAL at 06:30

## 2018-01-15 RX ADMIN — Medication 10 MILLIGRAM(S): at 06:30

## 2018-01-15 RX ADMIN — CALCITRIOL 1 MICROGRAM(S): 0.5 CAPSULE ORAL at 12:00

## 2018-01-15 RX ADMIN — Medication 50 MILLIGRAM(S): at 18:00

## 2018-01-15 RX ADMIN — ONDANSETRON 4 MILLIGRAM(S): 8 TABLET, FILM COATED ORAL at 17:00

## 2018-01-15 RX ADMIN — Medication 10 MILLIGRAM(S): at 17:00

## 2018-01-15 RX ADMIN — Medication 5 MILLIGRAM(S): at 21:10

## 2018-01-15 RX ADMIN — GABAPENTIN 100 MILLIGRAM(S): 400 CAPSULE ORAL at 06:52

## 2018-01-15 RX ADMIN — ONDANSETRON 4 MILLIGRAM(S): 8 TABLET, FILM COATED ORAL at 01:27

## 2018-01-15 RX ADMIN — Medication 400 MILLIGRAM(S): at 07:32

## 2018-01-15 RX ADMIN — GABAPENTIN 100 MILLIGRAM(S): 400 CAPSULE ORAL at 17:00

## 2018-01-15 RX ADMIN — Medication 325 MILLIGRAM(S): at 21:10

## 2018-01-15 RX ADMIN — Medication 400 MILLIGRAM(S): at 01:28

## 2018-01-15 RX ADMIN — Medication 1000 MILLIGRAM(S): at 02:00

## 2018-01-15 RX ADMIN — Medication 1000 MILLIGRAM(S): at 08:00

## 2018-01-15 RX ADMIN — Medication 400 MILLIGRAM(S): at 16:45

## 2018-01-15 RX ADMIN — Medication 325 MILLIGRAM(S): at 22:00

## 2018-01-15 RX ADMIN — PANTOPRAZOLE SODIUM 40 MILLIGRAM(S): 20 TABLET, DELAYED RELEASE ORAL at 06:30

## 2018-01-15 RX ADMIN — Medication 325 MILLIGRAM(S): at 07:30

## 2018-01-15 RX ADMIN — I.V. FAT EMULSION 20.83 GRAM(S): 20 EMULSION INTRAVENOUS at 21:11

## 2018-01-15 RX ADMIN — Medication 325 MILLIGRAM(S): at 00:34

## 2018-01-15 RX ADMIN — NYSTATIN CREAM 1 APPLICATION(S): 100000 CREAM TOPICAL at 06:52

## 2018-01-15 RX ADMIN — ONDANSETRON 4 MILLIGRAM(S): 8 TABLET, FILM COATED ORAL at 07:30

## 2018-01-15 RX ADMIN — Medication 50 MILLIGRAM(S): at 12:00

## 2018-01-15 RX ADMIN — ESCITALOPRAM OXALATE 20 MILLIGRAM(S): 10 TABLET, FILM COATED ORAL at 21:10

## 2018-01-15 RX ADMIN — Medication 325 MILLIGRAM(S): at 06:30

## 2018-01-15 RX ADMIN — NYSTATIN CREAM 1 APPLICATION(S): 100000 CREAM TOPICAL at 18:00

## 2018-01-15 RX ADMIN — ONDANSETRON 4 MILLIGRAM(S): 8 TABLET, FILM COATED ORAL at 23:29

## 2018-01-15 RX ADMIN — ENOXAPARIN SODIUM 40 MILLIGRAM(S): 100 INJECTION SUBCUTANEOUS at 06:30

## 2018-01-15 RX ADMIN — ENOXAPARIN SODIUM 40 MILLIGRAM(S): 100 INJECTION SUBCUTANEOUS at 18:00

## 2018-01-15 RX ADMIN — Medication 50 MILLIGRAM(S): at 23:28

## 2018-01-15 NOTE — PROGRESS NOTE ADULT - SUBJECTIVE AND OBJECTIVE BOX
GI reconsult note    Pt seen and examined at bedside. GI reconsulted for EGD and colonoscopy prior to bariatric reconstruction. Pt denies abdominal pain, nausea, vomiting, melena, hematochezia. Has no current complaints.     Allergies    sulfa drugs (Unknown)  sulfamethoxazole (Other)    MEDICATIONS:  MEDICATIONS  (STANDING):  bismuth subsalicylate Liquid 30 milliLiter(s) Oral daily  calcitriol Injectable 1 MICROGram(s) IV Push <User Schedule>  collagenase Ointment 1 Application(s) Topical daily  cyanocobalamin Injectable 1000 MICROGram(s) SubCutaneous every 7 days  dextrose 5%. 1000 milliLiter(s) (50 mL/Hr) IV Continuous <Continuous>  dextrose 50% Injectable 25 Gram(s) IV Push once  dextrose 50% Injectable 12.5 Gram(s) IV Push once  diazepam    Tablet 5 milliGRAM(s) Oral at bedtime  diphenhydrAMINE   Injectable 50 milliGRAM(s) IV Push every 6 hours  enoxaparin Injectable 40 milliGRAM(s) SubCutaneous two times a day  escitalopram 20 milliGRAM(s) Oral daily  fat emulsion (Plant Based) 20% IVPB 50 Gram(s) IV Intermittent once  gabapentin 100 milliGRAM(s) Oral two times a day  heparin  flush 100 Units/mL Injectable 100 Unit(s) IV Push every other day  heparin  flush 100 Units/mL Injectable 100 Unit(s) IV Push every other day  heparin  flush 100 Units/mL Injectable 100 Unit(s) IV Push every other day  insulin lispro (HumaLOG) corrective regimen sliding scale   SubCutaneous every 6 hours  lidocaine   Patch 1 Patch Transdermal daily  losartan 50 milliGRAM(s) Oral daily  nystatin Powder 1 Application(s) Topical two times a day  octreotide  Injectable 450 MICROGram(s) IV Push daily  ondansetron   Disintegrating Tablet 8 milliGRAM(s) Oral once  oxybutynin 10 milliGRAM(s) Oral two times a day  pantoprazole  Injectable 40 milliGRAM(s) IV Push daily  Parenteral Nutrition - Adult 1 Each (73 mL/Hr) TPN Continuous <Continuous>  Parenteral Nutrition - Adult 1 Each (73 mL/Hr) TPN Continuous <Continuous>  sodium chloride 0.9% lock flush 20 milliLiter(s) IV Push once    MEDICATIONS  (PRN):  acetaminophen   Tablet. 325 milliGRAM(s) Oral every 4 hours PRN Moderate Pain (4 - 6)  ondansetron Injectable 4 milliGRAM(s) IV Push every 6 hours PRN Nausea and/or Vomiting  sodium chloride 0.9% lock flush 10 milliLiter(s) IV Push every 1 hour PRN After each medication administration  sodium chloride 0.9% lock flush 10 milliLiter(s) IV Push every 12 hours PRN Lumen of catheter NOT used    Vital Signs Last 24 Hrs  T(C): 36.6 (15 Jorge 2018 08:50), Max: 36.6 (15 Jorge 2018 08:50)  T(F): 97.9 (15 Jorge 2018 08:50), Max: 97.9 (15 Jorge 2018 08:50)  HR: 69 (15 Jorge 2018 08:50) (62 - 73)  BP: 133/80 (15 Jorge 2018 08:50) (107/73 - 154/86)  BP(mean): --  RR: 14 (15 Jorge 2018 08:50) (14 - 18)  SpO2: 96% (15 Jorge 2018 08:50) (94% - 97%)    01-14 @ 07:01  -  01-15 @ 07:00  --------------------------------------------------------  IN: 2875 mL / OUT: 650 mL / NET: 2225 mL    01-15 @ 07:01  -  01-15 @ 12:01  --------------------------------------------------------  IN: 73 mL / OUT: 0 mL / NET: 73 mL      PHYSICAL EXAM:    General: Well developed; well nourished; in no acute distress  HEENT: MMM, conjunctiva and sclera clear  Gastrointestinal: Soft non-tender non-distended; Abdominal wound dressing intact, No rebound or guarding  Skin: Warm and dry. No obvious rash    LABS:    01-15    141  |  102  |  33<H>  ----------------------------<  123<H>  4.5   |  30  |  0.63    Ca    9.6      15 Jorge 2018 06:27  Phos  3.4     01-15  Mg     2.0     01-15    RADIOLOGY & ADDITIONAL STUDIES:  CT Abdomen and Pelvis w/ Oral Cont and w/ IV Cont (01.04.18 @ 15:58)  FINDINGS:  Images of the lower chest demonstrate small bilateral pleural effusions   with subjacent partial bilateral lower lobe collapse, worse on the left.   A small column of fluid density in the distal esophagus is noted,   suggesting a component of reflux.    The liver, spleen, gallbladder, right kidney and both adrenal glands are   unremarkable in appearance. There is unchanged appearance of the left   kidney and prominent renal pelvis. A stable appearing cortical   hypodensity is again noted in the left kidney. Small amount of residual   perisplenic ascites is noted.    No abdominal aortic aneurysm is seen. No retroperitoneal mass or   lymphadenopathy is seen.     Redemonstrated are extensive postsurgical changes with multiple   anastomotic sutures about the stomach and throughout several loops of   proximal small bowel, with a large left ventral abdominal mucous fistula.   Scattered areas of fat necrosis throughout the left abdominal peritoneum   are noted. Rectally instilled enteric contrast opacifies nearly the   entire colon, which is unremarkable in appearance. There is also   retrograde opacification of the distal ileum through an incompetent   ileocecal valve. There is no evidence of bowel obstruction or air-fluid   levels. No large amount of ascites or pneumoperitoneum. No drainable or   peripherally enhancing fluid collection in the abdomen and pelvis.    The urinary bladder is unremarkable in appearance. A stable appearing 1.8   cm hypodense lesion in the left ovary is again noted. The uterus and   right ovary are grossly unchanged in appearance.    Evaluation of the osseous structures demonstrates stable grade 1   degenerative anterolisthesis of L4 and L5. Stable unchanged appearance of   the pubic symphysis. No aggressive lytic or blastic osseous lesion.      IMPRESSION:  1. No evidence of bowel obstruction, large amount of ascites or   pneumoperitoneum. No drainable or peripherally enhancing fluid collection.    2. Trace amount of perisplenic ascites is noted.    3. Small bilateral pleural effusions with partial bilateral lower lobe   collapse, worse on the left.

## 2018-01-15 NOTE — PROGRESS NOTE ADULT - SUBJECTIVE AND OBJECTIVE BOX
O/N: BRAYDEN, VSS  1/14: BRAYDEN O/N: BRAYDEN, VSS  1/14: BRAYDEN    INTERVAL HPI/OVERNIGHT EVENTS:      SUBJECTIVE: Pt seen and examined at bedside. Complaining of chronic back pain.   Flatus: [ ] YES [ ] NO             Bowel Movement: [ ] YES [ ] NO  Pain (0-10):            Pain Control Adequate: [ ] YES [ ] NO  Nausea: [ ] YES [x ] NO            Vomiting: [ ] YES [x ] NO  Diarrhea: [ ] YES [x ] NO         Constipation: [ ] YES [ x] NO     Chest Pain: [ ] YES [x ] NO    SOB:  [ ] YES [x ] NO    MEDICATIONS  (STANDING):  bismuth subsalicylate Liquid 30 milliLiter(s) Oral daily  calcitriol Injectable 1 MICROGram(s) IV Push <User Schedule>  collagenase Ointment 1 Application(s) Topical daily  cyanocobalamin Injectable 1000 MICROGram(s) SubCutaneous every 7 days  dextrose 5%. 1000 milliLiter(s) (50 mL/Hr) IV Continuous <Continuous>  dextrose 50% Injectable 25 Gram(s) IV Push once  dextrose 50% Injectable 12.5 Gram(s) IV Push once  diazepam    Tablet 5 milliGRAM(s) Oral at bedtime  diphenhydrAMINE   Injectable 50 milliGRAM(s) IV Push every 6 hours  enoxaparin Injectable 40 milliGRAM(s) SubCutaneous two times a day  escitalopram 20 milliGRAM(s) Oral daily  gabapentin 100 milliGRAM(s) Oral two times a day  heparin  flush 100 Units/mL Injectable 100 Unit(s) IV Push every other day  heparin  flush 100 Units/mL Injectable 100 Unit(s) IV Push every other day  heparin  flush 100 Units/mL Injectable 100 Unit(s) IV Push every other day  insulin lispro (HumaLOG) corrective regimen sliding scale   SubCutaneous every 6 hours  lidocaine   Patch 1 Patch Transdermal daily  losartan 50 milliGRAM(s) Oral daily  nystatin Powder 1 Application(s) Topical two times a day  octreotide  Injectable 450 MICROGram(s) IV Push daily  ondansetron   Disintegrating Tablet 8 milliGRAM(s) Oral once  oxybutynin 10 milliGRAM(s) Oral two times a day  pantoprazole  Injectable 40 milliGRAM(s) IV Push daily  Parenteral Nutrition - Adult 1 Each (73 mL/Hr) TPN Continuous <Continuous>  sodium chloride 0.9% lock flush 20 milliLiter(s) IV Push once    MEDICATIONS  (PRN):  acetaminophen   Tablet. 325 milliGRAM(s) Oral every 4 hours PRN Moderate Pain (4 - 6)  ondansetron Injectable 4 milliGRAM(s) IV Push every 6 hours PRN Nausea and/or Vomiting  sodium chloride 0.9% lock flush 10 milliLiter(s) IV Push every 1 hour PRN After each medication administration  sodium chloride 0.9% lock flush 10 milliLiter(s) IV Push every 12 hours PRN Lumen of catheter NOT used      Vital Signs Last 24 Hrs  T(C): 36.4 (15 Jorge 2018 05:50), Max: 36.8 (14 Jan 2018 09:45)  T(F): 97.6 (15 Jorge 2018 05:50), Max: 98.3 (14 Jan 2018 09:45)  HR: 63 (15 Jorge 2018 05:50) (62 - 73)  BP: 124/78 (15 Jorge 2018 05:50) (107/73 - 154/86)  BP(mean): --  RR: 17 (15 Jorge 2018 05:50) (16 - 20)  SpO2: 95% (15 Jorge 2018 05:50) (94% - 97%)    PHYSICAL EXAM:    Constitutional: A&Ox3    Respiratory: non labored breathing, no respiratory distress    Cardiovascular:  RRR    Gastrointestinal: Soft ND,NT                 Incision:  granulation tissue around fistula site    Genitourinary: voiding    Extremities: (-) edema              I&O's Detail    14 Jan 2018 07:01  -  15 Jorge 2018 07:00  --------------------------------------------------------  IN:    Oral Fluid: 120 mL    Solution: 200 mL    TPN (Total Parenteral Nutrition): 876 mL    TPN (Total Parenteral Nutrition): 1679 mL  Total IN: 2875 mL    OUT:    Drain: 150 mL    Drain: 200 mL    Drain: 300 mL  Total OUT: 650 mL    Total NET: 2225 mL      15 Jorge 2018 07:01  -  15 Jorge 2018 08:24  --------------------------------------------------------  IN:    TPN (Total Parenteral Nutrition): 73 mL  Total IN: 73 mL    OUT:  Total OUT: 0 mL    Total NET: 73 mL          LABS:    01-15    141  |  102  |  33<H>  ----------------------------<  123<H>  4.5   |  30  |  0.63    Ca    9.6      15 Jorge 2018 06:27  Phos  3.4     01-15  Mg     2.0     01-15            RADIOLOGY & ADDITIONAL STUDIES:

## 2018-01-15 NOTE — PROGRESS NOTE ADULT - SUBJECTIVE AND OBJECTIVE BOX
reconstruction scheduled for 1/30  continue present management  albumin this week was 2.9 probable hydration was 3.8 week before  and on same tpn  will get endoscopy this week

## 2018-01-15 NOTE — PROGRESS NOTE ADULT - SUBJECTIVE AND OBJECTIVE BOX
On interval followup, the left subclavian venous catheter site is clean, dry, non-inflamed and non-tender. T 100 range. Notes, cultures and progress are followed.

## 2018-01-15 NOTE — PROGRESS NOTE ADULT - ASSESSMENT
56 YO F s/p SBR, fistula resection, esophagojejunostomy revision w/ post-op course complicated by RTOR for distal anastomosis breakdown, ATN, acute respiratory failure, & septic shock, MRSA bacteremia, now resolved scheduled for bariatric revision.     # Pre-op planning  - Will plan for EGD +/- enteroscopy +/- colonoscopy on Wednesday  - NPO p MN on Tuesday  - Please check CBC, CMP, PT/INR, EKG, active T&S on Wednesday    Case d/w Dr. Sumner  GI will follow

## 2018-01-16 LAB
ALBUMIN SERPL ELPH-MCNC: 3.1 G/DL — LOW (ref 3.3–5)
ALP SERPL-CCNC: 200 U/L — HIGH (ref 40–120)
ALT FLD-CCNC: 32 U/L — SIGNIFICANT CHANGE UP (ref 10–45)
ANION GAP SERPL CALC-SCNC: 10 MMOL/L — SIGNIFICANT CHANGE UP (ref 5–17)
ANION GAP SERPL CALC-SCNC: 8 MMOL/L — SIGNIFICANT CHANGE UP (ref 5–17)
APTT BLD: 38 SEC — HIGH (ref 27.5–37.4)
AST SERPL-CCNC: 17 U/L — SIGNIFICANT CHANGE UP (ref 10–40)
BILIRUB SERPL-MCNC: 0.6 MG/DL — SIGNIFICANT CHANGE UP (ref 0.2–1.2)
BLD GP AB SCN SERPL QL: NEGATIVE — SIGNIFICANT CHANGE UP
BUN SERPL-MCNC: 29 MG/DL — HIGH (ref 7–23)
BUN SERPL-MCNC: 30 MG/DL — HIGH (ref 7–23)
CALCIUM SERPL-MCNC: 9.4 MG/DL — SIGNIFICANT CHANGE UP (ref 8.4–10.5)
CALCIUM SERPL-MCNC: 9.6 MG/DL — SIGNIFICANT CHANGE UP (ref 8.4–10.5)
CHLORIDE SERPL-SCNC: 102 MMOL/L — SIGNIFICANT CHANGE UP (ref 96–108)
CHLORIDE SERPL-SCNC: 99 MMOL/L — SIGNIFICANT CHANGE UP (ref 96–108)
CO2 SERPL-SCNC: 28 MMOL/L — SIGNIFICANT CHANGE UP (ref 22–31)
CO2 SERPL-SCNC: 29 MMOL/L — SIGNIFICANT CHANGE UP (ref 22–31)
CREAT SERPL-MCNC: 0.63 MG/DL — SIGNIFICANT CHANGE UP (ref 0.5–1.3)
CREAT SERPL-MCNC: 0.64 MG/DL — SIGNIFICANT CHANGE UP (ref 0.5–1.3)
GLUCOSE BLDC GLUCOMTR-MCNC: 109 MG/DL — HIGH (ref 70–99)
GLUCOSE BLDC GLUCOMTR-MCNC: 118 MG/DL — HIGH (ref 70–99)
GLUCOSE BLDC GLUCOMTR-MCNC: 128 MG/DL — HIGH (ref 70–99)
GLUCOSE SERPL-MCNC: 123 MG/DL — HIGH (ref 70–99)
GLUCOSE SERPL-MCNC: 126 MG/DL — HIGH (ref 70–99)
HCT VFR BLD CALC: 29.2 % — LOW (ref 34.5–45)
HGB BLD-MCNC: 9.5 G/DL — LOW (ref 11.5–15.5)
INR BLD: 1.18 — HIGH (ref 0.88–1.16)
MAGNESIUM SERPL-MCNC: 2 MG/DL — SIGNIFICANT CHANGE UP (ref 1.6–2.6)
MAGNESIUM SERPL-MCNC: 2.1 MG/DL — SIGNIFICANT CHANGE UP (ref 1.6–2.6)
MCHC RBC-ENTMCNC: 27.9 PG — SIGNIFICANT CHANGE UP (ref 27–34)
MCHC RBC-ENTMCNC: 32.5 G/DL — SIGNIFICANT CHANGE UP (ref 32–36)
MCV RBC AUTO: 85.6 FL — SIGNIFICANT CHANGE UP (ref 80–100)
PHOSPHATE SERPL-MCNC: 3 MG/DL — SIGNIFICANT CHANGE UP (ref 2.5–4.5)
PHOSPHATE SERPL-MCNC: 3.1 MG/DL — SIGNIFICANT CHANGE UP (ref 2.5–4.5)
PLATELET # BLD AUTO: 177 K/UL — SIGNIFICANT CHANGE UP (ref 150–400)
POTASSIUM SERPL-MCNC: 4.2 MMOL/L — SIGNIFICANT CHANGE UP (ref 3.5–5.3)
POTASSIUM SERPL-MCNC: 4.3 MMOL/L — SIGNIFICANT CHANGE UP (ref 3.5–5.3)
POTASSIUM SERPL-SCNC: 4.2 MMOL/L — SIGNIFICANT CHANGE UP (ref 3.5–5.3)
POTASSIUM SERPL-SCNC: 4.3 MMOL/L — SIGNIFICANT CHANGE UP (ref 3.5–5.3)
PROT SERPL-MCNC: 7.4 G/DL — SIGNIFICANT CHANGE UP (ref 6–8.3)
PROTHROM AB SERPL-ACNC: 13.1 SEC — HIGH (ref 9.8–12.7)
RBC # BLD: 3.41 M/UL — LOW (ref 3.8–5.2)
RBC # FLD: 16.7 % — SIGNIFICANT CHANGE UP (ref 10.3–16.9)
RH IG SCN BLD-IMP: POSITIVE — SIGNIFICANT CHANGE UP
SODIUM SERPL-SCNC: 136 MMOL/L — SIGNIFICANT CHANGE UP (ref 135–145)
SODIUM SERPL-SCNC: 140 MMOL/L — SIGNIFICANT CHANGE UP (ref 135–145)
WBC # BLD: 4.4 K/UL — SIGNIFICANT CHANGE UP (ref 3.8–10.5)
WBC # FLD AUTO: 4.4 K/UL — SIGNIFICANT CHANGE UP (ref 3.8–10.5)

## 2018-01-16 RX ORDER — ELECTROLYTE SOLUTION,INJ
1 VIAL (ML) INTRAVENOUS
Qty: 0 | Refills: 0 | Status: DISCONTINUED | OUTPATIENT
Start: 2018-01-16 | End: 2018-01-16

## 2018-01-16 RX ORDER — HYDROMORPHONE HYDROCHLORIDE 2 MG/ML
0.25 INJECTION INTRAMUSCULAR; INTRAVENOUS; SUBCUTANEOUS
Qty: 0 | Refills: 0 | Status: DISCONTINUED | OUTPATIENT
Start: 2018-01-16 | End: 2018-01-16

## 2018-01-16 RX ADMIN — Medication 5 MILLIGRAM(S): at 21:34

## 2018-01-16 RX ADMIN — LOSARTAN POTASSIUM 50 MILLIGRAM(S): 100 TABLET, FILM COATED ORAL at 06:18

## 2018-01-16 RX ADMIN — Medication 1 EACH: at 18:17

## 2018-01-16 RX ADMIN — Medication 50 MILLIGRAM(S): at 11:08

## 2018-01-16 RX ADMIN — Medication 50 MILLIGRAM(S): at 23:01

## 2018-01-16 RX ADMIN — GABAPENTIN 100 MILLIGRAM(S): 400 CAPSULE ORAL at 06:21

## 2018-01-16 RX ADMIN — Medication 100 UNIT(S): at 11:08

## 2018-01-16 RX ADMIN — NYSTATIN CREAM 1 APPLICATION(S): 100000 CREAM TOPICAL at 06:22

## 2018-01-16 RX ADMIN — LIDOCAINE 1 PATCH: 4 CREAM TOPICAL at 18:17

## 2018-01-16 RX ADMIN — GABAPENTIN 100 MILLIGRAM(S): 400 CAPSULE ORAL at 17:07

## 2018-01-16 RX ADMIN — Medication 50 MILLIGRAM(S): at 17:06

## 2018-01-16 RX ADMIN — Medication 325 MILLIGRAM(S): at 04:30

## 2018-01-16 RX ADMIN — ENOXAPARIN SODIUM 40 MILLIGRAM(S): 100 INJECTION SUBCUTANEOUS at 06:17

## 2018-01-16 RX ADMIN — Medication 10 MILLIGRAM(S): at 06:17

## 2018-01-16 RX ADMIN — Medication 325 MILLIGRAM(S): at 03:51

## 2018-01-16 RX ADMIN — ENOXAPARIN SODIUM 40 MILLIGRAM(S): 100 INJECTION SUBCUTANEOUS at 17:07

## 2018-01-16 RX ADMIN — HYDROMORPHONE HYDROCHLORIDE 0.25 MILLIGRAM(S): 2 INJECTION INTRAMUSCULAR; INTRAVENOUS; SUBCUTANEOUS at 09:18

## 2018-01-16 RX ADMIN — Medication 1 APPLICATION(S): at 06:23

## 2018-01-16 RX ADMIN — OCTREOTIDE ACETATE 450 MICROGRAM(S): 200 INJECTION, SOLUTION INTRAVENOUS; SUBCUTANEOUS at 11:08

## 2018-01-16 RX ADMIN — PANTOPRAZOLE SODIUM 40 MILLIGRAM(S): 20 TABLET, DELAYED RELEASE ORAL at 06:17

## 2018-01-16 RX ADMIN — LIDOCAINE 1 PATCH: 4 CREAM TOPICAL at 06:26

## 2018-01-16 RX ADMIN — HYDROMORPHONE HYDROCHLORIDE 0.25 MILLIGRAM(S): 2 INJECTION INTRAMUSCULAR; INTRAVENOUS; SUBCUTANEOUS at 09:35

## 2018-01-16 RX ADMIN — NYSTATIN CREAM 1 APPLICATION(S): 100000 CREAM TOPICAL at 17:07

## 2018-01-16 RX ADMIN — ESCITALOPRAM OXALATE 20 MILLIGRAM(S): 10 TABLET, FILM COATED ORAL at 21:34

## 2018-01-16 RX ADMIN — Medication 10 MILLIGRAM(S): at 17:07

## 2018-01-16 RX ADMIN — Medication 50 MILLIGRAM(S): at 06:20

## 2018-01-16 NOTE — PROGRESS NOTE ADULT - SUBJECTIVE AND OBJECTIVE BOX
Pt seen and examined   She reports feeling better today than previous days overall. no complaints  persistent yellow drainage from abdominal fistulae.   reports no liquid or solid stool in months, rarely passes gas    REVIEW OF SYSTEMS:  Constitutional: No fever, weight loss or fatigue  Cardiovascular: No chest pain, palpitations, dizziness or leg swelling  Gastrointestinal: No abdominal or epigastric pain. No nausea, vomiting or hematemesis; No diarrhea or constipation. No melena or hematochezia.  Skin: No itching, burning, rashes or lesions       MEDICATIONS:  MEDICATIONS  (STANDING):  bismuth subsalicylate Liquid 30 milliLiter(s) Oral daily  calcitriol Injectable 1 MICROGram(s) IV Push <User Schedule>  collagenase Ointment 1 Application(s) Topical daily  cyanocobalamin Injectable 1000 MICROGram(s) SubCutaneous every 7 days  dextrose 5%. 1000 milliLiter(s) (50 mL/Hr) IV Continuous <Continuous>  dextrose 50% Injectable 25 Gram(s) IV Push once  dextrose 50% Injectable 12.5 Gram(s) IV Push once  diazepam    Tablet 5 milliGRAM(s) Oral at bedtime  diphenhydrAMINE   Injectable 50 milliGRAM(s) IV Push every 6 hours  enoxaparin Injectable 40 milliGRAM(s) SubCutaneous two times a day  escitalopram 20 milliGRAM(s) Oral daily  gabapentin 100 milliGRAM(s) Oral two times a day  heparin  flush 100 Units/mL Injectable 100 Unit(s) IV Push every other day  insulin lispro (HumaLOG) corrective regimen sliding scale   SubCutaneous every 6 hours  lidocaine   Patch 1 Patch Transdermal daily  losartan 50 milliGRAM(s) Oral daily  nystatin Powder 1 Application(s) Topical two times a day  octreotide  Injectable 450 MICROGram(s) IV Push daily  ondansetron   Disintegrating Tablet 8 milliGRAM(s) Oral once  oxybutynin 10 milliGRAM(s) Oral two times a day  pantoprazole  Injectable 40 milliGRAM(s) IV Push daily  Parenteral Nutrition - Adult 1 Each (73 mL/Hr) TPN Continuous <Continuous>  Parenteral Nutrition - Adult 1 Each (73 mL/Hr) TPN Continuous <Continuous>  sodium chloride 0.9% lock flush 20 milliLiter(s) IV Push once    MEDICATIONS  (PRN):  acetaminophen   Tablet. 325 milliGRAM(s) Oral every 4 hours PRN Moderate Pain (4 - 6)  ondansetron Injectable 4 milliGRAM(s) IV Push every 6 hours PRN Nausea and/or Vomiting  sodium chloride 0.9% lock flush 10 milliLiter(s) IV Push every 1 hour PRN After each medication administration  sodium chloride 0.9% lock flush 10 milliLiter(s) IV Push every 12 hours PRN Lumen of catheter NOT used      Allergies    sulfa drugs (Unknown)  sulfamethoxazole (Other)    Intolerances        Vital Signs Last 24 Hrs  T(C): 36.8 (16 Jan 2018 08:30), Max: 38.3 (15 Jorge 2018 16:37)  T(F): 98.3 (16 Jan 2018 08:30), Max: 100.9 (15 Jorge 2018 16:37)  HR: 78 (16 Jan 2018 08:30) (78 - 94)  BP: 133/85 (16 Jan 2018 08:30) (106/71 - 151/87)  BP(mean): --  RR: 16 (16 Jan 2018 08:30) (15 - 17)  SpO2: 94% (16 Jan 2018 08:30) (94% - 97%)    01-15 @ 07:01 - 01-16 @ 07:00  --------------------------------------------------------  IN: 876 mL / OUT: 350 mL / NET: 526 mL    01-16 @ 07:01 - 01-16 @ 13:56  --------------------------------------------------------  IN: 365 mL / OUT: 0 mL / NET: 365 mL        PHYSICAL EXAM:    General: Well developed; well nourished; in no acute distress  HEENT: MMM, conjunctiva and sclera clear  Gastrointestinal: Obese, Soft non-tender non-distended; Normal bowel sounds; No hepatosplenomegaly. No rebound or guarding  Skin over abdomenmacerated with coalescent fistulae with yellow drainage from drainage tube  trace LE edema    LABS:    01-16    136  |  99  |  29<H>  ----------------------------<  126<H>  4.2   |  29  |  0.64    Ca    9.4      16 Jan 2018 07:04  Phos  3.0     01-16  Mg     2.0     01-16                      RADIOLOGY & ADDITIONAL STUDIES:

## 2018-01-16 NOTE — PROGRESS NOTE ADULT - ASSESSMENT
56 YO F s/p SBR, fistula resection, esophagojejunostomy revision w/ post-op course complicated by RTOR for distal anastomosis breakdown, ATN, acute respiratory failure, & septic shock, MRSA bacteremia, now resolved scheduled for bariatric revision.     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 Persistent drainage with no colonic output,. plan for endoscopic evaluation prior to surgical revision.  - Will plan for EGD +/- enteroscopy +/- colonoscopy on Wednesday  - NPO p MN on Tuesday  - Please check CBC, CMP, PT/INR, EKG, active T&S today    Case to be  d/w Dr. Sumner 56 YO F s/p SBR, fistula resection, esophagojejunostomy revision w/ post-op course complicated by RTOR for distal anastomosis breakdown, ATN, acute respiratory failure, & septic shock, MRSA bacteremia, now resolved scheduled for bariatric revision.     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 Persistent drainage with no colonic output,. plan for endoscopic evaluation prior to surgical revision. Discussed with Dr. Brady, no need for colonoscopy at this time as CT does not demonstrate passage of contrast and also clinically patient not passing liquid from rectum.   - Will plan for EGD +/- push enteroscopy  tomorrow  - NPO p MN  - Please check CBC, CMP, PT/INR, EKG, active T&S today    Case d/w Dr. Sumner

## 2018-01-16 NOTE — PROGRESS NOTE ADULT - SUBJECTIVE AND OBJECTIVE BOX
O/N: BRAYDEN, VSS  1/15: Vac changed. BRAYDEN, VSS, Tmax 100.9 OVERNIGHT EVENTS: BRAYDEN, VSS  1/15: Vac changed. BRAYDEN, VSS, Tmax 100.9        SUBJECTIVE: Patient examined bedside denies any complaints.   Flatus: [] YES [X] NO             Bowel Movement: [ ] YES [X ] NO  Pain (0-10):            Pain Control Adequate: [ X] YES [ ] NO  Nausea: [ ] YES [X ] NO            Vomiting: [ ] YES [X] NO  Diarrhea: [ ] YES [ X] NO         Constipation: [ ] YES [ X] NO     Chest Pain: [ ] YES [ X] NO    SOB:  [ ] YES [ X] NO    enoxaparin Injectable 40 milliGRAM(s) SubCutaneous two times a day  heparin  flush 100 Units/mL Injectable 100 Unit(s) IV Push every other day  losartan 50 milliGRAM(s) Oral daily      Vital Signs Last 24 Hrs  T(C): 37.4 (16 Jan 2018 05:44), Max: 38.3 (15 Jorge 2018 16:37)  T(F): 99.4 (16 Jan 2018 05:44), Max: 100.9 (15 Jorge 2018 16:37)  HR: 88 (16 Jan 2018 05:44) (69 - 94)  BP: 141/94 (16 Jan 2018 05:44) (106/71 - 151/87)  BP(mean): --  RR: 16 (16 Jan 2018 05:44) (14 - 17)  SpO2: 95% (16 Jan 2018 05:44) (94% - 97%)  I&O's Detail    15 Jorge 2018 07:01  -  16 Jan 2018 07:00  --------------------------------------------------------  IN:    TPN (Total Parenteral Nutrition): 876 mL  Total IN: 876 mL    OUT:    Drain: 250 mL    Drain: 100 mL  Total OUT: 350 mL    Total NET: 526 mL          General: NAD, resting comfortably in bed  C/V: NSR  Pulm: Nonlabored breathing, no respiratory distress  Abd: soft, non distended , granulating tissue around fistulas   Extrem: WWP, no edema, SCDs in place        LABS:    01-16    136  |  99  |  29<H>  ----------------------------<  126<H>  4.2   |  29  |  0.64    Ca    9.4      16 Jan 2018 07:04  Phos  3.0     01-16  Mg     2.0     01-16            RADIOLOGY & ADDITIONAL STUDIES:

## 2018-01-16 NOTE — CHART NOTE - NSCHARTNOTEFT_GEN_A_CORE
Admitting Diagnosis:   Patient is a 57y old  Female who presents with a chief complaint of enterocutaneous fistula (24 Jul 2017 14:15)      PAST MEDICAL & SURGICAL HISTORY:  Reflux  Obesity  DVT (deep venous thrombosis): 2013 neck  Diverticulitis  Hypertension  Elective surgery: abodominal wall surgery  dec 2016  Elective surgery: NOV 2016 gastric bypass revision  Gastric bypass status for obesity: gastric sleeve, 6/2012  S/P breast biopsy: 2011, left  S/P colon resection: 2011  S/P knee surgery: repair 2009, 2011  right; left  Umbilical hernia: 2000  S/P appendectomy: 1975  S/P tonsillectomy: 1967    Current Nutrition Order:  NPO except ice chips and water  >> TPN via central venous line:  1752 TV (73ml/hr); 355g D, 104g AA, 50g 20% lipids (2123 kcal, 2g/kg IBW pro, GIR 3.14 based on most recent standing wt)    PO Intake: Good (%) [   ]  Fair (50-75%) [   ] Poor (<25%) [   ] N/A   Per note in Patient Care order- pt allowed 3 hard candies/day    GI Issues: No reported GI distress at this time.  per GI note: pt reports no liquid or solid stool in months, rarely passes gas    Pain: No C/o pain.    Skin Integrity: sacrum un-stageable PU; sacral abrasion; abd wound-->ostomy manager to LIWS  Continuous vac changes until ready for Sx      Labs:   01-16    136  |  99  |  29<H>  ----------------------------<  126<H>  4.2   |  29  |  0.64    Ca    9.4      16 Jan 2018 07:04  Phos  3.0     01-16  Mg     2.0     01-16      CAPILLARY BLOOD GLUCOSE      POCT Blood Glucose.: 118 mg/dL (16 Jan 2018 12:04)  POCT Blood Glucose.: 128 mg/dL (16 Jan 2018 06:11)  POCT Blood Glucose.: 125 mg/dL (15 Jorge 2018 23:25)  POCT Blood Glucose.: 94 mg/dL (15 Jorge 2018 16:25)      Medications:  MEDICATIONS  (STANDING):  bismuth subsalicylate Liquid 30 milliLiter(s) Oral daily  calcitriol Injectable 1 MICROGram(s) IV Push <User Schedule>  collagenase Ointment 1 Application(s) Topical daily  cyanocobalamin Injectable 1000 MICROGram(s) SubCutaneous every 7 days  dextrose 5%. 1000 milliLiter(s) (50 mL/Hr) IV Continuous <Continuous>  dextrose 50% Injectable 25 Gram(s) IV Push once  dextrose 50% Injectable 12.5 Gram(s) IV Push once  diazepam    Tablet 5 milliGRAM(s) Oral at bedtime  diphenhydrAMINE   Injectable 50 milliGRAM(s) IV Push every 6 hours  enoxaparin Injectable 40 milliGRAM(s) SubCutaneous two times a day  escitalopram 20 milliGRAM(s) Oral daily  gabapentin 100 milliGRAM(s) Oral two times a day  heparin  flush 100 Units/mL Injectable 100 Unit(s) IV Push every other day  insulin lispro (HumaLOG) corrective regimen sliding scale   SubCutaneous every 6 hours  lidocaine   Patch 1 Patch Transdermal daily  losartan 50 milliGRAM(s) Oral daily  nystatin Powder 1 Application(s) Topical two times a day  octreotide  Injectable 450 MICROGram(s) IV Push daily  ondansetron   Disintegrating Tablet 8 milliGRAM(s) Oral once  oxybutynin 10 milliGRAM(s) Oral two times a day  pantoprazole  Injectable 40 milliGRAM(s) IV Push daily  Parenteral Nutrition - Adult 1 Each (73 mL/Hr) TPN Continuous <Continuous>  Parenteral Nutrition - Adult 1 Each (73 mL/Hr) TPN Continuous <Continuous>  sodium chloride 0.9% lock flush 20 milliLiter(s) IV Push once    MEDICATIONS  (PRN):  acetaminophen   Tablet. 325 milliGRAM(s) Oral every 4 hours PRN Moderate Pain (4 - 6)  ondansetron Injectable 4 milliGRAM(s) IV Push every 6 hours PRN Nausea and/or Vomiting  sodium chloride 0.9% lock flush 10 milliLiter(s) IV Push every 1 hour PRN After each medication administration  sodium chloride 0.9% lock flush 10 milliLiter(s) IV Push every 12 hours PRN Lumen of catheter NOT used      Weight:  1755.1 (1/8/18)  172.5 (12/19)  171.5lb (12/14)  167lb (11/13)  164lb (10/17)  219lb (8/1)    new standing wt taken 1/15 is 174.8lb    Weight Change:   03/2016: 89kg  02/2017: 74kg   07/2017: 76kg  11/2017: 76kg   1/2018: 79.5    Estimated energy needs:   Estimated energy needs using 52 kg IBW:  increased needs per wound and pressure ulcer healing. needs calculated based on TPN as primary route of nutrition.  30-35 kcal/kg (5842-5999 kcal).   1.8-2 g/kg ( g protein).  30-35 mL/kg (9101-9201 mL fluid).     Subjective:   Plan for EGD +/- enteroscopy +/- colonoscopy on Wednesday1/17 and revision scheduled for 1/30.  NPO except for ice chips and water. Pt allowed 3 hard candies/day. New standing wt obtained 1/15 as pt was doing PT in halls (174.8lbs).  New pre-alb and triglyceride labs WNL. Protein now 2g/kg IBW. RD to follow per policy.  BG trending 120-150. Consider decreasing Dex to 270g if wt continues to trend up and pre-alb and triglycerides are stable. Fluids, micronutrients and lytes per MD discretion.     Previous Nutrition Diagnosis: Increased nutrient needs RT increased demand for nutrients AEB wound and pressure ulcer healing   Active [  X]  Resolved [   ]    If resolved, new PES: N/A    Goal: Pt to meet >75% EER via tolerated route    Recommendations:  1) Continue w/ current TPN order as tolerated and assess feasibility of other routes w/wound healing  2) Appreciate order new pre-alb labs  3) Monitor triglycerides & adjust lipids per MD discretion -Please obtain new lab values.  4) Please continue to take standing weight for trending purposes  5) Consider OsCal tablet crushed w/ water/italian ice     Education: Understands meeting nutrient needs via TPN.    Risk Level: High [  X ] Moderate [   ] Low [   ].

## 2018-01-17 LAB
ANION GAP SERPL CALC-SCNC: 9 MMOL/L — SIGNIFICANT CHANGE UP (ref 5–17)
BUN SERPL-MCNC: 31 MG/DL — HIGH (ref 7–23)
CALCIUM SERPL-MCNC: 9.8 MG/DL — SIGNIFICANT CHANGE UP (ref 8.4–10.5)
CHLORIDE SERPL-SCNC: 103 MMOL/L — SIGNIFICANT CHANGE UP (ref 96–108)
CO2 SERPL-SCNC: 29 MMOL/L — SIGNIFICANT CHANGE UP (ref 22–31)
CREAT SERPL-MCNC: 0.63 MG/DL — SIGNIFICANT CHANGE UP (ref 0.5–1.3)
GLUCOSE BLDC GLUCOMTR-MCNC: 116 MG/DL — HIGH (ref 70–99)
GLUCOSE BLDC GLUCOMTR-MCNC: 121 MG/DL — HIGH (ref 70–99)
GLUCOSE BLDC GLUCOMTR-MCNC: 149 MG/DL — HIGH (ref 70–99)
GLUCOSE BLDC GLUCOMTR-MCNC: 94 MG/DL — SIGNIFICANT CHANGE UP (ref 70–99)
GLUCOSE SERPL-MCNC: 120 MG/DL — HIGH (ref 70–99)
POTASSIUM SERPL-MCNC: 4.5 MMOL/L — SIGNIFICANT CHANGE UP (ref 3.5–5.3)
POTASSIUM SERPL-SCNC: 4.5 MMOL/L — SIGNIFICANT CHANGE UP (ref 3.5–5.3)
SODIUM SERPL-SCNC: 141 MMOL/L — SIGNIFICANT CHANGE UP (ref 135–145)

## 2018-01-17 PROCEDURE — 43249 ESOPH EGD DILATION <30 MM: CPT

## 2018-01-17 RX ORDER — ELECTROLYTE SOLUTION,INJ
1 VIAL (ML) INTRAVENOUS
Qty: 0 | Refills: 0 | Status: DISCONTINUED | OUTPATIENT
Start: 2018-01-17 | End: 2018-01-17

## 2018-01-17 RX ORDER — I.V. FAT EMULSION 20 G/100ML
0.5 EMULSION INTRAVENOUS
Qty: 50.05 | Refills: 0 | Status: DISCONTINUED | OUTPATIENT
Start: 2018-01-17 | End: 2018-01-17

## 2018-01-17 RX ORDER — ACETAMINOPHEN 500 MG
1000 TABLET ORAL ONCE
Qty: 0 | Refills: 0 | Status: COMPLETED | OUTPATIENT
Start: 2018-01-17 | End: 2018-01-17

## 2018-01-17 RX ADMIN — ENOXAPARIN SODIUM 40 MILLIGRAM(S): 100 INJECTION SUBCUTANEOUS at 17:14

## 2018-01-17 RX ADMIN — Medication 50 MILLIGRAM(S): at 23:01

## 2018-01-17 RX ADMIN — NYSTATIN CREAM 1 APPLICATION(S): 100000 CREAM TOPICAL at 05:55

## 2018-01-17 RX ADMIN — Medication 5 MILLIGRAM(S): at 21:03

## 2018-01-17 RX ADMIN — LOSARTAN POTASSIUM 50 MILLIGRAM(S): 100 TABLET, FILM COATED ORAL at 05:53

## 2018-01-17 RX ADMIN — Medication 50 MILLIGRAM(S): at 17:15

## 2018-01-17 RX ADMIN — ENOXAPARIN SODIUM 40 MILLIGRAM(S): 100 INJECTION SUBCUTANEOUS at 06:14

## 2018-01-17 RX ADMIN — Medication 50 MILLIGRAM(S): at 05:53

## 2018-01-17 RX ADMIN — ESCITALOPRAM OXALATE 20 MILLIGRAM(S): 10 TABLET, FILM COATED ORAL at 21:03

## 2018-01-17 RX ADMIN — Medication 400 MILLIGRAM(S): at 09:30

## 2018-01-17 RX ADMIN — Medication 73 EACH: at 18:00

## 2018-01-17 RX ADMIN — I.V. FAT EMULSION 20.85 GM/KG/DAY: 20 EMULSION INTRAVENOUS at 23:01

## 2018-01-17 RX ADMIN — Medication 1 APPLICATION(S): at 05:56

## 2018-01-17 RX ADMIN — CALCITRIOL 1 MICROGRAM(S): 0.5 CAPSULE ORAL at 11:13

## 2018-01-17 RX ADMIN — Medication 10 MILLIGRAM(S): at 05:52

## 2018-01-17 RX ADMIN — GABAPENTIN 100 MILLIGRAM(S): 400 CAPSULE ORAL at 17:14

## 2018-01-17 RX ADMIN — PANTOPRAZOLE SODIUM 40 MILLIGRAM(S): 20 TABLET, DELAYED RELEASE ORAL at 05:52

## 2018-01-17 RX ADMIN — OCTREOTIDE ACETATE 450 MICROGRAM(S): 200 INJECTION, SOLUTION INTRAVENOUS; SUBCUTANEOUS at 11:13

## 2018-01-17 RX ADMIN — GABAPENTIN 100 MILLIGRAM(S): 400 CAPSULE ORAL at 05:52

## 2018-01-17 RX ADMIN — Medication 10 MILLIGRAM(S): at 17:14

## 2018-01-17 RX ADMIN — Medication 50 MILLIGRAM(S): at 11:13

## 2018-01-17 NOTE — PROGRESS NOTE ADULT - SUBJECTIVE AND OBJECTIVE BOX
O/N: NPO, labs unchanged, VSS  1/16: Vac change today, BRAYDEN, VSS OVERNIGHT EVENTS: NPO, labs unchanged, VSS  1/16: Vac change today, BRAYDEN, VSS    SUBJECTIVE: Patient examined bedside denies complaints.  Flatus: [] YES [X] NO             Bowel Movement: [ ] YES [X ] NO  Pain (0-10):            Pain Control Adequate: [ X] YES [ ] NO  Nausea: [ ] YES [X ] NO            Vomiting: [ ] YES [ X] NO  Diarrhea: [ ] YES [ X] NO         Constipation: [ ] YES [ X] NO     Chest Pain: [ ] YES [X ] NO    SOB:  [ ] YES [X ] NO    enoxaparin Injectable 40 milliGRAM(s) SubCutaneous two times a day  heparin  flush 100 Units/mL Injectable 100 Unit(s) IV Push every other day  losartan 50 milliGRAM(s) Oral daily      Vital Signs Last 24 Hrs  T(C): 36.6 (17 Jan 2018 05:33), Max: 37.4 (16 Jan 2018 14:19)  T(F): 97.9 (17 Jan 2018 05:33), Max: 99.3 (16 Jan 2018 14:19)  HR: 66 (17 Jan 2018 05:33) (66 - 82)  BP: 144/70 (17 Jan 2018 05:33) (108/70 - 144/70)  BP(mean): --  RR: 17 (17 Jan 2018 05:33) (16 - 17)  SpO2: 95% (17 Jan 2018 05:33) (93% - 95%)  I&O's Detail    16 Jan 2018 07:01  -  17 Jan 2018 07:00  --------------------------------------------------------  IN:    TPN (Total Parenteral Nutrition): 876 mL    TPN (Total Parenteral Nutrition): 1752 mL  Total IN: 2628 mL    OUT:    Drain: 700 mL    Drain: 1400 mL  Total OUT: 2100 mL    Total NET: 528 mL          General: NAD, resting comfortably in bed  C/V: NSR  Pulm: Nonlabored breathing, no respiratory distress  Abd: soft, non distended, non tender, granulating tissue around fistula.  Extrem: WWP, no edema, SCDs in place        LABS:                        9.5    4.4   )-----------( 177      ( 16 Jan 2018 18:46 )             29.2     01-17    141  |  103  |  31<H>  ----------------------------<  120<H>  4.5   |  29  |  0.63    Ca    9.8      17 Jan 2018 06:27  Phos  3.1     01-16  Mg     2.1     01-16    TPro  7.4  /  Alb  3.1<L>  /  TBili  0.6  /  DBili  x   /  AST  17  /  ALT  32  /  AlkPhos  200<H>  01-16    PT/INR - ( 16 Jan 2018 18:46 )   PT: 13.1 sec;   INR: 1.18          PTT - ( 16 Jan 2018 18:46 )  PTT:38.0 sec      RADIOLOGY & ADDITIONAL STUDIES:

## 2018-01-18 LAB
ANION GAP SERPL CALC-SCNC: 10 MMOL/L — SIGNIFICANT CHANGE UP (ref 5–17)
BUN SERPL-MCNC: 27 MG/DL — HIGH (ref 7–23)
CALCIUM SERPL-MCNC: 9.6 MG/DL — SIGNIFICANT CHANGE UP (ref 8.4–10.5)
CHLORIDE SERPL-SCNC: 101 MMOL/L — SIGNIFICANT CHANGE UP (ref 96–108)
CO2 SERPL-SCNC: 27 MMOL/L — SIGNIFICANT CHANGE UP (ref 22–31)
CREAT SERPL-MCNC: 0.51 MG/DL — SIGNIFICANT CHANGE UP (ref 0.5–1.3)
GLUCOSE BLDC GLUCOMTR-MCNC: 121 MG/DL — HIGH (ref 70–99)
GLUCOSE BLDC GLUCOMTR-MCNC: 125 MG/DL — HIGH (ref 70–99)
GLUCOSE BLDC GLUCOMTR-MCNC: 129 MG/DL — HIGH (ref 70–99)
GLUCOSE BLDC GLUCOMTR-MCNC: 130 MG/DL — HIGH (ref 70–99)
GLUCOSE SERPL-MCNC: 131 MG/DL — HIGH (ref 70–99)
MAGNESIUM SERPL-MCNC: 2 MG/DL — SIGNIFICANT CHANGE UP (ref 1.6–2.6)
PHOSPHATE SERPL-MCNC: 3.1 MG/DL — SIGNIFICANT CHANGE UP (ref 2.5–4.5)
POTASSIUM SERPL-MCNC: 4.3 MMOL/L — SIGNIFICANT CHANGE UP (ref 3.5–5.3)
POTASSIUM SERPL-SCNC: 4.3 MMOL/L — SIGNIFICANT CHANGE UP (ref 3.5–5.3)
SODIUM SERPL-SCNC: 138 MMOL/L — SIGNIFICANT CHANGE UP (ref 135–145)

## 2018-01-18 RX ORDER — DIAZEPAM 5 MG
5 TABLET ORAL AT BEDTIME
Qty: 0 | Refills: 0 | Status: DISCONTINUED | OUTPATIENT
Start: 2018-01-18 | End: 2018-01-25

## 2018-01-18 RX ORDER — ELECTROLYTE SOLUTION,INJ
1 VIAL (ML) INTRAVENOUS
Qty: 0 | Refills: 0 | Status: DISCONTINUED | OUTPATIENT
Start: 2018-01-18 | End: 2018-01-18

## 2018-01-18 RX ADMIN — Medication 10 MILLIGRAM(S): at 06:15

## 2018-01-18 RX ADMIN — Medication 325 MILLIGRAM(S): at 21:56

## 2018-01-18 RX ADMIN — Medication 325 MILLIGRAM(S): at 15:04

## 2018-01-18 RX ADMIN — Medication 5 MILLIGRAM(S): at 21:57

## 2018-01-18 RX ADMIN — GABAPENTIN 100 MILLIGRAM(S): 400 CAPSULE ORAL at 06:15

## 2018-01-18 RX ADMIN — ENOXAPARIN SODIUM 40 MILLIGRAM(S): 100 INJECTION SUBCUTANEOUS at 06:15

## 2018-01-18 RX ADMIN — Medication 325 MILLIGRAM(S): at 15:54

## 2018-01-18 RX ADMIN — Medication 325 MILLIGRAM(S): at 22:56

## 2018-01-18 RX ADMIN — ONDANSETRON 4 MILLIGRAM(S): 8 TABLET, FILM COATED ORAL at 00:09

## 2018-01-18 RX ADMIN — GABAPENTIN 100 MILLIGRAM(S): 400 CAPSULE ORAL at 17:24

## 2018-01-18 RX ADMIN — Medication 325 MILLIGRAM(S): at 00:09

## 2018-01-18 RX ADMIN — NYSTATIN CREAM 1 APPLICATION(S): 100000 CREAM TOPICAL at 06:16

## 2018-01-18 RX ADMIN — Medication 1 APPLICATION(S): at 06:17

## 2018-01-18 RX ADMIN — Medication 50 MILLIGRAM(S): at 06:16

## 2018-01-18 RX ADMIN — ONDANSETRON 4 MILLIGRAM(S): 8 TABLET, FILM COATED ORAL at 15:04

## 2018-01-18 RX ADMIN — Medication 10 MILLIGRAM(S): at 17:24

## 2018-01-18 RX ADMIN — PANTOPRAZOLE SODIUM 40 MILLIGRAM(S): 20 TABLET, DELAYED RELEASE ORAL at 06:16

## 2018-01-18 RX ADMIN — ENOXAPARIN SODIUM 40 MILLIGRAM(S): 100 INJECTION SUBCUTANEOUS at 17:24

## 2018-01-18 RX ADMIN — LOSARTAN POTASSIUM 50 MILLIGRAM(S): 100 TABLET, FILM COATED ORAL at 06:16

## 2018-01-18 RX ADMIN — Medication 50 MILLIGRAM(S): at 17:24

## 2018-01-18 RX ADMIN — Medication 100 UNIT(S): at 11:09

## 2018-01-18 RX ADMIN — Medication 50 MILLIGRAM(S): at 11:08

## 2018-01-18 RX ADMIN — ESCITALOPRAM OXALATE 20 MILLIGRAM(S): 10 TABLET, FILM COATED ORAL at 21:57

## 2018-01-18 RX ADMIN — OCTREOTIDE ACETATE 450 MICROGRAM(S): 200 INJECTION, SOLUTION INTRAVENOUS; SUBCUTANEOUS at 11:08

## 2018-01-18 RX ADMIN — Medication 325 MILLIGRAM(S): at 01:09

## 2018-01-18 RX ADMIN — PREGABALIN 1000 MICROGRAM(S): 225 CAPSULE ORAL at 11:09

## 2018-01-18 RX ADMIN — ONDANSETRON 4 MILLIGRAM(S): 8 TABLET, FILM COATED ORAL at 22:00

## 2018-01-18 NOTE — PROGRESS NOTE ADULT - SUBJECTIVE AND OBJECTIVE BOX
Psychiatry Follow Up Note    Patient seen at bedside.  Reports some back pain.  Denies depressed mood.  Anticipating surgery and being able to see granddaughter and pregnant daughter again, who have not been able to visit.    Patient continues to be seen by psychology intern Ange Yeung for supportive psychotherapy 2-3 times weekly.    Millie Hatfield MD  [facility] Attending

## 2018-01-18 NOTE — PROGRESS NOTE ADULT - ASSESSMENT
58 YO F s/p SBR, fistula resection, esophagojejunostomy revision w/ post-op course complicated by RTOR for distal anastomosis breakdown, ATN, acute respiratory failure, & septic shock, MRSA bacteremia, now resolved scheduled for bariatric revision.     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 Persistent drainage with no colonic output,.   s/p repeat EGD demonstrating healed mucosal defect at the esophago-jejunal junction. There aas normal mucosa in the blind puch and the endoscope was advanced 55-60cm to the enterocutaneous communication which demonstrated some luminal narrowing s/p dilation. Otherwise no other communications identified.   Patient to proceed to revisional bariatric surgery with Dr. Brady  -Please re-consult as needed for additional assistance

## 2018-01-18 NOTE — PROGRESS NOTE ADULT - SUBJECTIVE AND OBJECTIVE BOX
O/N: VSS. BRAYDEN  1/17 EGD preformed OVERNIGHT EVENTS: VSS. BRAYDEN  1/17 EGD preformed        SUBJECTIVE: Patient denies any complaints  Flatus: [X] YES [] NO             Bowel Movement: [X ] YES [ ] NO  Pain (0-10):            Pain Control Adequate: [ X] YES [ ] NO  Nausea: [ ] YES [ X] NO            Vomiting: [ ] YES [X ] NO  Diarrhea: [ ] YES [ X] NO         Constipation: [ ] YES [X ] NO     Chest Pain: [ ] YES [ X] NO    SOB:  [ ] YES [ X] NO    enoxaparin Injectable 40 milliGRAM(s) SubCutaneous two times a day  heparin  flush 100 Units/mL Injectable 100 Unit(s) IV Push every other day  losartan 50 milliGRAM(s) Oral daily      Vital Signs Last 24 Hrs  T(C): 37 (18 Jan 2018 05:51), Max: 37.1 (17 Jan 2018 16:30)  T(F): 98.6 (18 Jan 2018 05:51), Max: 98.7 (17 Jan 2018 16:30)  HR: 68 (18 Jan 2018 05:51) (54 - 83)  BP: 144/85 (18 Jan 2018 05:51) (104/72 - 148/80)  BP(mean): 101 (17 Jan 2018 09:45) (93 - 113)  RR: 17 (18 Jan 2018 05:51) (14 - 22)  SpO2: 95% (18 Jan 2018 05:51) (95% - 100%)  I&O's Detail    17 Jan 2018 07:01  -  18 Jan 2018 07:00  --------------------------------------------------------  IN:    fat emulsion (Plant Based) 20% Infusion: 250.2 mL    Oral Fluid: 40 mL    TPN (Total Parenteral Nutrition): 876 mL  Total IN: 1166.2 mL    OUT:    Drain: 100 mL  Total OUT: 100 mL    Total NET: 1066.2 mL          General: NAD, resting comfortably in bed  C/V: NSR  Pulm: Nonlabored breathing, no respiratory distress  Abd: soft, NT/ND, granulating tissue around fistula   Extrem: WWP, no edema, SCDs in place        LABS:                        9.5    4.4   )-----------( 177      ( 16 Jan 2018 18:46 )             29.2     01-17    141  |  103  |  31<H>  ----------------------------<  120<H>  4.5   |  29  |  0.63    Ca    9.8      17 Jan 2018 06:27  Phos  3.1     01-16  Mg     2.1     01-16    TPro  7.4  /  Alb  3.1<L>  /  TBili  0.6  /  DBili  x   /  AST  17  /  ALT  32  /  AlkPhos  200<H>  01-16    PT/INR - ( 16 Jan 2018 18:46 )   PT: 13.1 sec;   INR: 1.18          PTT - ( 16 Jan 2018 18:46 )  PTT:38.0 sec      RADIOLOGY & ADDITIONAL STUDIES:

## 2018-01-18 NOTE — PROGRESS NOTE ADULT - SUBJECTIVE AND OBJECTIVE BOX
On interval followup, the left subclavian venous catheter site is clean, dry, non-inflamed and non-tender.  T99 range. Notes, cultures and progress are followed.  The biopatch dressing change was discussed with the nurse.

## 2018-01-18 NOTE — PROGRESS NOTE ADULT - SUBJECTIVE AND OBJECTIVE BOX
Pt seen and examined   feelign well post procedure, no abd pain, no n/v no fevers or chills.  adequate ostomy output demonstrated.     REVIEW OF SYSTEMS:  Constitutional: No fever, weight loss or fatigue  Cardiovascular: No chest pain, palpitations, dizziness or leg swelling  Gastrointestinal: No abdominal or epigastric pain. No nausea, vomiting or hematemesis; No melena or hematochezia.  Skin: No itching, burning, rashes or lesions       MEDICATIONS:  MEDICATIONS  (STANDING):  bismuth subsalicylate Liquid 30 milliLiter(s) Oral daily  calcitriol Injectable 1 MICROGram(s) IV Push <User Schedule>  collagenase Ointment 1 Application(s) Topical daily  cyanocobalamin Injectable 1000 MICROGram(s) SubCutaneous every 7 days  dextrose 5%. 1000 milliLiter(s) (50 mL/Hr) IV Continuous <Continuous>  dextrose 50% Injectable 25 Gram(s) IV Push once  dextrose 50% Injectable 12.5 Gram(s) IV Push once  diazepam    Tablet 5 milliGRAM(s) Oral at bedtime  diphenhydrAMINE   Injectable 50 milliGRAM(s) IV Push every 6 hours  enoxaparin Injectable 40 milliGRAM(s) SubCutaneous two times a day  escitalopram 20 milliGRAM(s) Oral daily  gabapentin 100 milliGRAM(s) Oral two times a day  heparin  flush 100 Units/mL Injectable 100 Unit(s) IV Push every other day  insulin lispro (HumaLOG) corrective regimen sliding scale   SubCutaneous every 6 hours  lidocaine   Patch 1 Patch Transdermal daily  losartan 50 milliGRAM(s) Oral daily  nystatin Powder 1 Application(s) Topical two times a day  octreotide  Injectable 450 MICROGram(s) IV Push daily  ondansetron   Disintegrating Tablet 8 milliGRAM(s) Oral once  oxybutynin 10 milliGRAM(s) Oral two times a day  pantoprazole  Injectable 40 milliGRAM(s) IV Push daily  Parenteral Nutrition - Adult 1 Each (73 mL/Hr) TPN Continuous <Continuous>  Parenteral Nutrition - Adult 1 Each (73 mL/Hr) TPN Continuous <Continuous>  sodium chloride 0.9% lock flush 20 milliLiter(s) IV Push once    MEDICATIONS  (PRN):  acetaminophen   Tablet. 325 milliGRAM(s) Oral every 4 hours PRN Moderate Pain (4 - 6)  ondansetron Injectable 4 milliGRAM(s) IV Push every 6 hours PRN Nausea and/or Vomiting  sodium chloride 0.9% lock flush 10 milliLiter(s) IV Push every 1 hour PRN After each medication administration  sodium chloride 0.9% lock flush 10 milliLiter(s) IV Push every 12 hours PRN Lumen of catheter NOT used      Allergies    sulfa drugs (Unknown)  sulfamethoxazole (Other)    Intolerances        Vital Signs Last 24 Hrs  T(C): 37 (18 Jan 2018 05:51), Max: 37.1 (17 Jan 2018 16:30)  T(F): 98.6 (18 Jan 2018 05:51), Max: 98.7 (17 Jan 2018 16:30)  HR: 68 (18 Jan 2018 05:51) (61 - 83)  BP: 144/85 (18 Jan 2018 05:51) (104/72 - 144/85)  BP(mean): --  RR: 17 (18 Jan 2018 05:51) (17 - 18)  SpO2: 95% (18 Jan 2018 05:51) (95% - 97%)    01-17 @ 07:01  -  01-18 @ 07:00  --------------------------------------------------------  IN: 1166.2 mL / OUT: 100 mL / NET: 1066.2 mL        PHYSICAL EXAM:    General: comfortable ; in no acute distress  HEENT: MMM, conjunctiva and sclera clear  Gastrointestinal: Soft non-tender non-distended; Normal bowel sounds; No hepatosplenomegaly. No rebound or guarding  yellow/brown liquid in ostomy bag  extram: sarcopenic  Skin: Warm and dry. No obvious rash    LABS:  CBC Full  -  ( 16 Jan 2018 18:46 )  WBC Count : 4.4 K/uL  Hemoglobin : 9.5 g/dL  Hematocrit : 29.2 %  Platelet Count - Automated : 177 K/uL  Mean Cell Volume : 85.6 fL  Mean Cell Hemoglobin : 27.9 pg  Mean Cell Hemoglobin Concentration : 32.5 g/dL  Auto Neutrophil # : x  Auto Lymphocyte # : x  Auto Monocyte # : x  Auto Eosinophil # : x  Auto Basophil # : x  Auto Neutrophil % : x  Auto Lymphocyte % : x  Auto Monocyte % : x  Auto Eosinophil % : x  Auto Basophil % : x    01-18    138  |  101  |  27<H>  ----------------------------<  131<H>  4.3   |  27  |  0.51    Ca    9.6      18 Jan 2018 10:56  Phos  3.1     01-18  Mg     2.0     01-18    TPro  7.4  /  Alb  3.1<L>  /  TBili  0.6  /  DBili  x   /  AST  17  /  ALT  32  /  AlkPhos  200<H>  01-16    PT/INR - ( 16 Jan 2018 18:46 )   PT: 13.1 sec;   INR: 1.18          PTT - ( 16 Jan 2018 18:46 )  PTT:38.0 sec                RADIOLOGY & ADDITIONAL STUDIES:

## 2018-01-18 NOTE — PROGRESS NOTE ADULT - ASSESSMENT
57 F s/p  SBR, fistula resection, esophagojejunostomy revision w/ post-op course complicated by RTOR for distal anastomosis breakdown, ATN, acute respiratory failure, & septic shock, MRSA bacteremia which resolved. Currently for wound care management and surgical planning.    1. Enterocutanous fistula  - wound care- ostomy manager to [facility]  - NPO/ TPN/2 sucking candy/day  - AM labs, weekly albumin, pre-albumin and triglyceride (every friday).      2. Hx of DVT   - Lovenox    3. Depression/ Anxiety   - escitalopram  - diazepam  - Gabapentin     4. GERD  - Protonix    5. PPX  OOB/ PT/ Is

## 2018-01-18 NOTE — CHART NOTE - NSCHARTNOTEFT_GEN_A_CORE
Admitting Diagnosis:   Patient is a 57y old  Female who presents with a chief complaint of enterocutaneous fistula (24 Jul 2017 14:15)      PAST MEDICAL & SURGICAL HISTORY:  Reflux  Obesity  DVT (deep venous thrombosis): 2013 neck  Diverticulitis  Hypertension  Elective surgery: abodominal wall surgery  dec 2016  Elective surgery: NOV 2016 gastric bypass revision  Gastric bypass status for obesity: gastric sleeve, 6/2012  S/P breast biopsy: 2011, left  S/P colon resection: 2011  S/P knee surgery: repair 2009, 2011  right; left  Umbilical hernia: 2000  S/P appendectomy: 1975  S/P tonsillectomy: 1967    Current Nutrition Order:  NPO except ice chips and water  >> TPN via central venous line:  1752 TV (73ml/hr); 355g D, 104g AA, 50g 20% lipids (2123 kcal, 2g/kg IBW pro, GIR 3.14 based on most recent standing wt)    PO Intake: Good (%) [   ]  Fair (50-75%) [   ] Poor (<25%) [   ] N/A   Per note in Patient Care order- pt allowed 3 hard candies/day    GI Issues: No reported GI distress at this time.  per GI note: pt reports no liquid or solid stool in months, rarely passes gas    Pain: No C/o pain.    Skin Integrity: sacrum un-stageable PU; sacral abrasion; abd wound-->ostomy manager to [facility]  Continuous vac changes until ready for Sx    Labs:   01-18    138  |  101  |  27<H>  ----------------------------<  131<H>  4.3   |  27  |  0.51    Ca    9.6      18 Jan 2018 10:56  Phos  3.1     01-18  Mg     2.0     01-18    TPro  7.4  /  Alb  3.1<L>  /  TBili  0.6  /  DBili  x   /  AST  17  /  ALT  32  /  AlkPhos  200<H>  01-16    CAPILLARY BLOOD GLUCOSE      POCT Blood Glucose.: 129 mg/dL (18 Jan 2018 05:35)  POCT Blood Glucose.: 125 mg/dL (18 Jan 2018 00:10)  POCT Blood Glucose.: 94 mg/dL (17 Jan 2018 17:37)  POCT Blood Glucose.: 149 mg/dL (17 Jan 2018 11:59)      Medications:  MEDICATIONS  (STANDING):  bismuth subsalicylate Liquid 30 milliLiter(s) Oral daily  calcitriol Injectable 1 MICROGram(s) IV Push <User Schedule>  collagenase Ointment 1 Application(s) Topical daily  cyanocobalamin Injectable 1000 MICROGram(s) SubCutaneous every 7 days  dextrose 5%. 1000 milliLiter(s) (50 mL/Hr) IV Continuous <Continuous>  dextrose 50% Injectable 25 Gram(s) IV Push once  dextrose 50% Injectable 12.5 Gram(s) IV Push once  diazepam    Tablet 5 milliGRAM(s) Oral at bedtime  diphenhydrAMINE   Injectable 50 milliGRAM(s) IV Push every 6 hours  enoxaparin Injectable 40 milliGRAM(s) SubCutaneous two times a day  escitalopram 20 milliGRAM(s) Oral daily  gabapentin 100 milliGRAM(s) Oral two times a day  heparin  flush 100 Units/mL Injectable 100 Unit(s) IV Push every other day  insulin lispro (HumaLOG) corrective regimen sliding scale   SubCutaneous every 6 hours  lidocaine   Patch 1 Patch Transdermal daily  losartan 50 milliGRAM(s) Oral daily  nystatin Powder 1 Application(s) Topical two times a day  octreotide  Injectable 450 MICROGram(s) IV Push daily  ondansetron   Disintegrating Tablet 8 milliGRAM(s) Oral once  oxybutynin 10 milliGRAM(s) Oral two times a day  pantoprazole  Injectable 40 milliGRAM(s) IV Push daily  Parenteral Nutrition - Adult 1 Each (73 mL/Hr) TPN Continuous <Continuous>  Parenteral Nutrition - Adult 1 Each (73 mL/Hr) TPN Continuous <Continuous>  sodium chloride 0.9% lock flush 20 milliLiter(s) IV Push once    MEDICATIONS  (PRN):  acetaminophen   Tablet. 325 milliGRAM(s) Oral every 4 hours PRN Moderate Pain (4 - 6)  ondansetron Injectable 4 milliGRAM(s) IV Push every 6 hours PRN Nausea and/or Vomiting  sodium chloride 0.9% lock flush 10 milliLiter(s) IV Push every 1 hour PRN After each medication administration  sodium chloride 0.9% lock flush 10 milliLiter(s) IV Push every 12 hours PRN Lumen of catheter NOT used      Weight:  1755.1 (1/8/18)  172.5 (12/19)  171.5lb (12/14)  167lb (11/13)  164lb (10/17)  219lb (8/1)    new standing wt taken 1/15 is 174.8lb    Weight Change:   03/2016: 89kg  02/2017: 74kg   07/2017: 76kg  11/2017: 76kg   1/2018: 79.5    Estimated energy needs:   Estimated energy needs using 52 kg IBW:  increased needs per wound and pressure ulcer healing. needs calculated based on TPN as primary route of nutrition.  30-35 kcal/kg (2105-7238 kcal).   1.8-2 g/kg ( g protein).  30-35 mL/kg (9137-3065 mL fluid).     Subjective:   s/p EGD 1/17, plan for revision scheduled for 1/30.  NPO except for ice chips and water. Pt allowed 3 hard candies/day. Most recent standing wt obtained 1/15 as pt was doing PT in halls (174.8lbs).  New pre-alb and triglyceride labs WNL. Protein now 2g/kg IBW. RD to follow per policy.  BG trending 120-150. Consider decreasing Dex to 270g if wt continues to trend up. Fluids, micronutrients and lytes per MD discretion.     Previous Nutrition Diagnosis: Increased nutrient needs RT increased demand for nutrients AEB wound and pressure ulcer healing   Active [  X]  Resolved [   ]    If resolved, new PES: N/A    Goal: Pt to meet >75% EER via tolerated route    Recommendations:  1) Continue w/ current TPN order as tolerated and assess feasibility of other routes w/wound healing  2) Appreciate order new pre-alb labs  3) Monitor triglycerides & adjust lipids per MD discretion -Please obtain new lab values.  4) Please continue to take standing weight for trending purposes  5) Consider OsCal tablet crushed w/ water/italian ice     Education: Understands meeting nutrient needs via TPN.    Risk Level: High [  X ] Moderate [   ] Low [   ].

## 2018-01-19 LAB
ALBUMIN SERPL ELPH-MCNC: 3.4 G/DL — SIGNIFICANT CHANGE UP (ref 3.3–5)
ALP SERPL-CCNC: 219 U/L — HIGH (ref 40–120)
ALT FLD-CCNC: 43 U/L — SIGNIFICANT CHANGE UP (ref 10–45)
ANION GAP SERPL CALC-SCNC: 9 MMOL/L — SIGNIFICANT CHANGE UP (ref 5–17)
AST SERPL-CCNC: 26 U/L — SIGNIFICANT CHANGE UP (ref 10–40)
BILIRUB SERPL-MCNC: 0.6 MG/DL — SIGNIFICANT CHANGE UP (ref 0.2–1.2)
BUN SERPL-MCNC: 31 MG/DL — HIGH (ref 7–23)
CALCIUM SERPL-MCNC: 9.9 MG/DL — SIGNIFICANT CHANGE UP (ref 8.4–10.5)
CHLORIDE SERPL-SCNC: 103 MMOL/L — SIGNIFICANT CHANGE UP (ref 96–108)
CO2 SERPL-SCNC: 31 MMOL/L — SIGNIFICANT CHANGE UP (ref 22–31)
CREAT SERPL-MCNC: 0.65 MG/DL — SIGNIFICANT CHANGE UP (ref 0.5–1.3)
GLUCOSE BLDC GLUCOMTR-MCNC: 108 MG/DL — HIGH (ref 70–99)
GLUCOSE BLDC GLUCOMTR-MCNC: 113 MG/DL — HIGH (ref 70–99)
GLUCOSE BLDC GLUCOMTR-MCNC: 115 MG/DL — HIGH (ref 70–99)
GLUCOSE BLDC GLUCOMTR-MCNC: 85 MG/DL — SIGNIFICANT CHANGE UP (ref 70–99)
GLUCOSE BLDC GLUCOMTR-MCNC: 87 MG/DL — SIGNIFICANT CHANGE UP (ref 70–99)
GLUCOSE SERPL-MCNC: 118 MG/DL — HIGH (ref 70–99)
POTASSIUM SERPL-MCNC: 4.4 MMOL/L — SIGNIFICANT CHANGE UP (ref 3.5–5.3)
POTASSIUM SERPL-SCNC: 4.4 MMOL/L — SIGNIFICANT CHANGE UP (ref 3.5–5.3)
PREALB SERPL-MCNC: 33 MG/DL — SIGNIFICANT CHANGE UP (ref 20–40)
PROT SERPL-MCNC: 7.5 G/DL — SIGNIFICANT CHANGE UP (ref 6–8.3)
SODIUM SERPL-SCNC: 143 MMOL/L — SIGNIFICANT CHANGE UP (ref 135–145)
TRIGL SERPL-MCNC: 106 MG/DL — SIGNIFICANT CHANGE UP (ref 10–149)

## 2018-01-19 RX ORDER — ELECTROLYTE SOLUTION,INJ
1 VIAL (ML) INTRAVENOUS
Qty: 0 | Refills: 0 | Status: DISCONTINUED | OUTPATIENT
Start: 2018-01-19 | End: 2018-01-19

## 2018-01-19 RX ORDER — I.V. FAT EMULSION 20 G/100ML
50 EMULSION INTRAVENOUS ONCE
Qty: 0 | Refills: 0 | Status: COMPLETED | OUTPATIENT
Start: 2018-01-19 | End: 2018-01-19

## 2018-01-19 RX ADMIN — Medication 10 MILLIGRAM(S): at 05:52

## 2018-01-19 RX ADMIN — CALCITRIOL 1 MICROGRAM(S): 0.5 CAPSULE ORAL at 11:25

## 2018-01-19 RX ADMIN — ENOXAPARIN SODIUM 40 MILLIGRAM(S): 100 INJECTION SUBCUTANEOUS at 17:01

## 2018-01-19 RX ADMIN — ENOXAPARIN SODIUM 40 MILLIGRAM(S): 100 INJECTION SUBCUTANEOUS at 05:52

## 2018-01-19 RX ADMIN — ONDANSETRON 4 MILLIGRAM(S): 8 TABLET, FILM COATED ORAL at 06:00

## 2018-01-19 RX ADMIN — I.V. FAT EMULSION 20.83 GRAM(S): 20 EMULSION INTRAVENOUS at 22:19

## 2018-01-19 RX ADMIN — OCTREOTIDE ACETATE 450 MICROGRAM(S): 200 INJECTION, SOLUTION INTRAVENOUS; SUBCUTANEOUS at 11:26

## 2018-01-19 RX ADMIN — LOSARTAN POTASSIUM 50 MILLIGRAM(S): 100 TABLET, FILM COATED ORAL at 05:53

## 2018-01-19 RX ADMIN — Medication 50 MILLIGRAM(S): at 11:27

## 2018-01-19 RX ADMIN — ONDANSETRON 4 MILLIGRAM(S): 8 TABLET, FILM COATED ORAL at 17:44

## 2018-01-19 RX ADMIN — Medication 1 EACH: at 17:44

## 2018-01-19 RX ADMIN — Medication 325 MILLIGRAM(S): at 22:18

## 2018-01-19 RX ADMIN — Medication 5 MILLIGRAM(S): at 22:17

## 2018-01-19 RX ADMIN — Medication 50 MILLIGRAM(S): at 06:00

## 2018-01-19 RX ADMIN — ESCITALOPRAM OXALATE 20 MILLIGRAM(S): 10 TABLET, FILM COATED ORAL at 22:18

## 2018-01-19 RX ADMIN — Medication 10 MILLIGRAM(S): at 17:01

## 2018-01-19 RX ADMIN — Medication 325 MILLIGRAM(S): at 22:45

## 2018-01-19 RX ADMIN — GABAPENTIN 100 MILLIGRAM(S): 400 CAPSULE ORAL at 17:01

## 2018-01-19 RX ADMIN — NYSTATIN CREAM 1 APPLICATION(S): 100000 CREAM TOPICAL at 05:53

## 2018-01-19 RX ADMIN — Medication 325 MILLIGRAM(S): at 17:45

## 2018-01-19 RX ADMIN — ONDANSETRON 4 MILLIGRAM(S): 8 TABLET, FILM COATED ORAL at 23:15

## 2018-01-19 RX ADMIN — Medication 50 MILLIGRAM(S): at 00:00

## 2018-01-19 RX ADMIN — Medication 50 MILLIGRAM(S): at 23:15

## 2018-01-19 RX ADMIN — Medication 50 MILLIGRAM(S): at 17:01

## 2018-01-19 RX ADMIN — PANTOPRAZOLE SODIUM 40 MILLIGRAM(S): 20 TABLET, DELAYED RELEASE ORAL at 06:00

## 2018-01-19 RX ADMIN — GABAPENTIN 100 MILLIGRAM(S): 400 CAPSULE ORAL at 05:53

## 2018-01-19 NOTE — PROGRESS NOTE ADULT - SUBJECTIVE AND OBJECTIVE BOX
O/N: VSS. BRAYDEN  1/18: VSS , BRAYDEN O/N: VSS. BRAYDEN  1/18: VSS , BRAYDEN     INTERVAL HPI/OVERNIGHT EVENTS:        SUBJECTIVE: Pt seen and examined at bedside. No acute complaints.   Flatus: [ ] YES [x ] NO             Bowel Movement: [ ] YES [x ] NO  Pain (0-10):            Pain Control Adequate: [ ] YES [ ] NO  Nausea: [ ] YES [x ] NO            Vomiting: [ ] YES [x ] NO  Diarrhea: [ ] YES [x ] NO         Constipation: [ ] YES [x ] NO     Chest Pain: [ ] YES [x ] NO    SOB:  [ ] YES [x ] NO    MEDICATIONS  (STANDING):  bismuth subsalicylate Liquid 30 milliLiter(s) Oral daily  calcitriol Injectable 1 MICROGram(s) IV Push <User Schedule>  collagenase Ointment 1 Application(s) Topical daily  cyanocobalamin Injectable 1000 MICROGram(s) SubCutaneous every 7 days  dextrose 5%. 1000 milliLiter(s) (50 mL/Hr) IV Continuous <Continuous>  dextrose 50% Injectable 25 Gram(s) IV Push once  dextrose 50% Injectable 12.5 Gram(s) IV Push once  diazepam    Tablet 5 milliGRAM(s) Oral at bedtime  diphenhydrAMINE   Injectable 50 milliGRAM(s) IV Push every 6 hours  enoxaparin Injectable 40 milliGRAM(s) SubCutaneous two times a day  escitalopram 20 milliGRAM(s) Oral daily  gabapentin 100 milliGRAM(s) Oral two times a day  heparin  flush 100 Units/mL Injectable 100 Unit(s) IV Push every other day  insulin lispro (HumaLOG) corrective regimen sliding scale   SubCutaneous every 6 hours  lidocaine   Patch 1 Patch Transdermal daily  losartan 50 milliGRAM(s) Oral daily  nystatin Powder 1 Application(s) Topical two times a day  octreotide  Injectable 450 MICROGram(s) IV Push daily  ondansetron   Disintegrating Tablet 8 milliGRAM(s) Oral once  oxybutynin 10 milliGRAM(s) Oral two times a day  pantoprazole  Injectable 40 milliGRAM(s) IV Push daily  Parenteral Nutrition - Adult 1 Each (73 mL/Hr) TPN Continuous <Continuous>  sodium chloride 0.9% lock flush 20 milliLiter(s) IV Push once    MEDICATIONS  (PRN):  acetaminophen   Tablet. 325 milliGRAM(s) Oral every 4 hours PRN Moderate Pain (4 - 6)  ondansetron Injectable 4 milliGRAM(s) IV Push every 6 hours PRN Nausea and/or Vomiting  sodium chloride 0.9% lock flush 10 milliLiter(s) IV Push every 1 hour PRN After each medication administration  sodium chloride 0.9% lock flush 10 milliLiter(s) IV Push every 12 hours PRN Lumen of catheter NOT used      Vital Signs Last 24 Hrs  T(C): 36.7 (19 Jan 2018 05:41), Max: 37.5 (18 Jan 2018 20:30)  T(F): 98.1 (19 Jan 2018 05:41), Max: 99.5 (18 Jan 2018 20:30)  HR: 70 (19 Jan 2018 05:41) (70 - 81)  BP: 123/82 (19 Jan 2018 05:41) (115/80 - 123/82)  BP(mean): --  RR: 17 (19 Jan 2018 05:41) (16 - 17)  SpO2: 95% (19 Jan 2018 05:41) (93% - 96%)    PHYSICAL EXAM:      General: NAD, resting comfortably in bed  C/V: NSR  Pulm: Nonlabored breathing, no respiratory distress  Abd: soft, NT/ND, granulating tissue around fistula   Extrem: WWP, no edema, SCDs in place                  I&O's Detail    18 Jan 2018 07:01  -  19 Jan 2018 07:00  --------------------------------------------------------  IN:    Oral Fluid: 240 mL    TPN (Total Parenteral Nutrition): 876 mL  Total IN: 1116 mL    OUT:    Drain: 650 mL  Total OUT: 650 mL    Total NET: 466 mL      19 Jan 2018 07:01  -  19 Jan 2018 07:43  --------------------------------------------------------  IN:    TPN (Total Parenteral Nutrition): 73 mL  Total IN: 73 mL    OUT:  Total OUT: 0 mL    Total NET: 73 mL          LABS:    01-18    138  |  101  |  27<H>  ----------------------------<  131<H>  4.3   |  27  |  0.51    Ca    9.6      18 Jan 2018 10:56  Phos  3.1     01-18  Mg     2.0     01-18            RADIOLOGY & ADDITIONAL STUDIES:

## 2018-01-19 NOTE — PROGRESS NOTE ADULT - SUBJECTIVE AND OBJECTIVE BOX
endoscopy done and ct scan done in preparation for reconstruction for 1/30  discussed with dr marmolejo and results of endoscopy show about 50 to 55 cm of bowel before fistulas  Eeophago jejunostomy is fine  ct shows that a significant amount of bowel is being pulled in area of fistulas  believe optimized well for surgery  will encourage ambulation before OR

## 2018-01-20 LAB
ANION GAP SERPL CALC-SCNC: 8 MMOL/L — SIGNIFICANT CHANGE UP (ref 5–17)
BUN SERPL-MCNC: 29 MG/DL — HIGH (ref 7–23)
CALCIUM SERPL-MCNC: 9.7 MG/DL — SIGNIFICANT CHANGE UP (ref 8.4–10.5)
CHLORIDE SERPL-SCNC: 99 MMOL/L — SIGNIFICANT CHANGE UP (ref 96–108)
CO2 SERPL-SCNC: 28 MMOL/L — SIGNIFICANT CHANGE UP (ref 22–31)
CREAT SERPL-MCNC: 0.58 MG/DL — SIGNIFICANT CHANGE UP (ref 0.5–1.3)
GLUCOSE BLDC GLUCOMTR-MCNC: 115 MG/DL — HIGH (ref 70–99)
GLUCOSE BLDC GLUCOMTR-MCNC: 70 MG/DL — SIGNIFICANT CHANGE UP (ref 70–99)
GLUCOSE BLDC GLUCOMTR-MCNC: 85 MG/DL — SIGNIFICANT CHANGE UP (ref 70–99)
GLUCOSE SERPL-MCNC: 134 MG/DL — HIGH (ref 70–99)
MAGNESIUM SERPL-MCNC: 2 MG/DL — SIGNIFICANT CHANGE UP (ref 1.6–2.6)
PHOSPHATE SERPL-MCNC: 3.3 MG/DL — SIGNIFICANT CHANGE UP (ref 2.5–4.5)
POTASSIUM SERPL-MCNC: 4 MMOL/L — SIGNIFICANT CHANGE UP (ref 3.5–5.3)
POTASSIUM SERPL-SCNC: 4 MMOL/L — SIGNIFICANT CHANGE UP (ref 3.5–5.3)
SODIUM SERPL-SCNC: 135 MMOL/L — SIGNIFICANT CHANGE UP (ref 135–145)

## 2018-01-20 RX ORDER — ELECTROLYTE SOLUTION,INJ
1 VIAL (ML) INTRAVENOUS
Qty: 0 | Refills: 0 | Status: DISCONTINUED | OUTPATIENT
Start: 2018-01-20 | End: 2018-01-20

## 2018-01-20 RX ADMIN — NYSTATIN CREAM 1 APPLICATION(S): 100000 CREAM TOPICAL at 17:30

## 2018-01-20 RX ADMIN — GABAPENTIN 100 MILLIGRAM(S): 400 CAPSULE ORAL at 06:24

## 2018-01-20 RX ADMIN — GABAPENTIN 100 MILLIGRAM(S): 400 CAPSULE ORAL at 17:43

## 2018-01-20 RX ADMIN — Medication 1 APPLICATION(S): at 06:25

## 2018-01-20 RX ADMIN — Medication 10 MILLIGRAM(S): at 17:43

## 2018-01-20 RX ADMIN — ENOXAPARIN SODIUM 40 MILLIGRAM(S): 100 INJECTION SUBCUTANEOUS at 17:42

## 2018-01-20 RX ADMIN — Medication 10 MILLIGRAM(S): at 07:00

## 2018-01-20 RX ADMIN — Medication 5 MILLIGRAM(S): at 22:03

## 2018-01-20 RX ADMIN — NYSTATIN CREAM 1 APPLICATION(S): 100000 CREAM TOPICAL at 06:25

## 2018-01-20 RX ADMIN — Medication 325 MILLIGRAM(S): at 12:30

## 2018-01-20 RX ADMIN — ONDANSETRON 4 MILLIGRAM(S): 8 TABLET, FILM COATED ORAL at 12:09

## 2018-01-20 RX ADMIN — Medication 325 MILLIGRAM(S): at 17:52

## 2018-01-20 RX ADMIN — Medication 325 MILLIGRAM(S): at 12:17

## 2018-01-20 RX ADMIN — OCTREOTIDE ACETATE 450 MICROGRAM(S): 200 INJECTION, SOLUTION INTRAVENOUS; SUBCUTANEOUS at 12:09

## 2018-01-20 RX ADMIN — Medication 50 MILLIGRAM(S): at 06:24

## 2018-01-20 RX ADMIN — ENOXAPARIN SODIUM 40 MILLIGRAM(S): 100 INJECTION SUBCUTANEOUS at 07:00

## 2018-01-20 RX ADMIN — Medication 50 MILLIGRAM(S): at 17:26

## 2018-01-20 RX ADMIN — ONDANSETRON 4 MILLIGRAM(S): 8 TABLET, FILM COATED ORAL at 19:30

## 2018-01-20 RX ADMIN — ESCITALOPRAM OXALATE 20 MILLIGRAM(S): 10 TABLET, FILM COATED ORAL at 22:03

## 2018-01-20 RX ADMIN — LOSARTAN POTASSIUM 50 MILLIGRAM(S): 100 TABLET, FILM COATED ORAL at 06:24

## 2018-01-20 RX ADMIN — Medication 325 MILLIGRAM(S): at 22:03

## 2018-01-20 RX ADMIN — Medication 50 MILLIGRAM(S): at 12:09

## 2018-01-20 RX ADMIN — Medication 325 MILLIGRAM(S): at 23:03

## 2018-01-20 RX ADMIN — Medication 100 UNIT(S): at 22:04

## 2018-01-20 RX ADMIN — Medication 325 MILLIGRAM(S): at 17:27

## 2018-01-20 RX ADMIN — Medication 50 MILLIGRAM(S): at 22:03

## 2018-01-20 RX ADMIN — PANTOPRAZOLE SODIUM 40 MILLIGRAM(S): 20 TABLET, DELAYED RELEASE ORAL at 06:23

## 2018-01-20 NOTE — PROGRESS NOTE ADULT - SUBJECTIVE AND OBJECTIVE BOX
O/N: VSS. BRAYDEN  1/19: BRAYDEN O/N: VSS. BRAYDEN  1/19: BRAYDEN      Vital Signs Last 24 Hrs  T(C): 36.6 (20 Jan 2018 06:05), Max: 37.2 (19 Jan 2018 08:52)  T(F): 97.8 (20 Jan 2018 06:05), Max: 99 (19 Jan 2018 17:28)  HR: 64 (20 Jan 2018 06:05) (64 - 75)  BP: 128/85 (20 Jan 2018 06:05) (116/77 - 138/82)  BP(mean): --  RR: 17 (20 Jan 2018 06:05) (17 - 18)  SpO2: 95% (20 Jan 2018 06:05) (94% - 99%)      I&O's Summary    18 Jan 2018 07:01  -  19 Jan 2018 07:00  --------------------------------------------------------  IN: 1116 mL / OUT: 650 mL / NET: 466 mL    19 Jan 2018 07:01  -  20 Jan 2018 06:33  --------------------------------------------------------  IN: 260.2 mL / OUT: 600 mL / NET: -339.8 mL        Gen: NAD  Abd: soft, nt / nd   vac in place

## 2018-01-21 LAB
ALBUMIN SERPL ELPH-MCNC: 2.9 G/DL — LOW (ref 3.3–5)
ALP SERPL-CCNC: 217 U/L — HIGH (ref 40–120)
ALT FLD-CCNC: 42 U/L — SIGNIFICANT CHANGE UP (ref 10–45)
ANION GAP SERPL CALC-SCNC: 10 MMOL/L — SIGNIFICANT CHANGE UP (ref 5–17)
AST SERPL-CCNC: 26 U/L — SIGNIFICANT CHANGE UP (ref 10–40)
BILIRUB SERPL-MCNC: 0.6 MG/DL — SIGNIFICANT CHANGE UP (ref 0.2–1.2)
BUN SERPL-MCNC: 29 MG/DL — HIGH (ref 7–23)
CALCIUM SERPL-MCNC: 9.5 MG/DL — SIGNIFICANT CHANGE UP (ref 8.4–10.5)
CHLORIDE SERPL-SCNC: 103 MMOL/L — SIGNIFICANT CHANGE UP (ref 96–108)
CO2 SERPL-SCNC: 28 MMOL/L — SIGNIFICANT CHANGE UP (ref 22–31)
CREAT SERPL-MCNC: 0.6 MG/DL — SIGNIFICANT CHANGE UP (ref 0.5–1.3)
GLUCOSE BLDC GLUCOMTR-MCNC: 101 MG/DL — HIGH (ref 70–99)
GLUCOSE BLDC GLUCOMTR-MCNC: 108 MG/DL — HIGH (ref 70–99)
GLUCOSE BLDC GLUCOMTR-MCNC: 125 MG/DL — HIGH (ref 70–99)
GLUCOSE BLDC GLUCOMTR-MCNC: 89 MG/DL — SIGNIFICANT CHANGE UP (ref 70–99)
GLUCOSE SERPL-MCNC: 140 MG/DL — HIGH (ref 70–99)
HCT VFR BLD CALC: 31.7 % — LOW (ref 34.5–45)
HGB BLD-MCNC: 10.2 G/DL — LOW (ref 11.5–15.5)
MAGNESIUM SERPL-MCNC: 2 MG/DL — SIGNIFICANT CHANGE UP (ref 1.6–2.6)
MCHC RBC-ENTMCNC: 27.6 PG — SIGNIFICANT CHANGE UP (ref 27–34)
MCHC RBC-ENTMCNC: 32.2 G/DL — SIGNIFICANT CHANGE UP (ref 32–36)
MCV RBC AUTO: 85.7 FL — SIGNIFICANT CHANGE UP (ref 80–100)
PHOSPHATE SERPL-MCNC: 3.4 MG/DL — SIGNIFICANT CHANGE UP (ref 2.5–4.5)
PLATELET # BLD AUTO: 227 K/UL — SIGNIFICANT CHANGE UP (ref 150–400)
POTASSIUM SERPL-MCNC: 4.2 MMOL/L — SIGNIFICANT CHANGE UP (ref 3.5–5.3)
POTASSIUM SERPL-SCNC: 4.2 MMOL/L — SIGNIFICANT CHANGE UP (ref 3.5–5.3)
PROT SERPL-MCNC: 6.9 G/DL — SIGNIFICANT CHANGE UP (ref 6–8.3)
RBC # BLD: 3.7 M/UL — LOW (ref 3.8–5.2)
RBC # FLD: 17 % — HIGH (ref 10.3–16.9)
SODIUM SERPL-SCNC: 141 MMOL/L — SIGNIFICANT CHANGE UP (ref 135–145)
TRIGL SERPL-MCNC: 98 MG/DL — SIGNIFICANT CHANGE UP (ref 10–149)
WBC # BLD: 4.3 K/UL — SIGNIFICANT CHANGE UP (ref 3.8–10.5)
WBC # FLD AUTO: 4.3 K/UL — SIGNIFICANT CHANGE UP (ref 3.8–10.5)

## 2018-01-21 RX ORDER — ELECTROLYTE SOLUTION,INJ
1 VIAL (ML) INTRAVENOUS
Qty: 0 | Refills: 0 | Status: DISCONTINUED | OUTPATIENT
Start: 2018-01-21 | End: 2018-01-21

## 2018-01-21 RX ORDER — HYDROMORPHONE HYDROCHLORIDE 2 MG/ML
0.25 INJECTION INTRAMUSCULAR; INTRAVENOUS; SUBCUTANEOUS ONCE
Qty: 0 | Refills: 0 | Status: DISCONTINUED | OUTPATIENT
Start: 2018-01-21 | End: 2018-01-21

## 2018-01-21 RX ORDER — I.V. FAT EMULSION 20 G/100ML
50 EMULSION INTRAVENOUS ONCE
Qty: 0 | Refills: 0 | Status: COMPLETED | OUTPATIENT
Start: 2018-01-21 | End: 2018-01-21

## 2018-01-21 RX ADMIN — ONDANSETRON 4 MILLIGRAM(S): 8 TABLET, FILM COATED ORAL at 19:07

## 2018-01-21 RX ADMIN — OCTREOTIDE ACETATE 450 MICROGRAM(S): 200 INJECTION, SOLUTION INTRAVENOUS; SUBCUTANEOUS at 12:40

## 2018-01-21 RX ADMIN — ENOXAPARIN SODIUM 40 MILLIGRAM(S): 100 INJECTION SUBCUTANEOUS at 06:00

## 2018-01-21 RX ADMIN — Medication 325 MILLIGRAM(S): at 07:30

## 2018-01-21 RX ADMIN — Medication 50 MILLIGRAM(S): at 05:18

## 2018-01-21 RX ADMIN — HYDROMORPHONE HYDROCHLORIDE 0.25 MILLIGRAM(S): 2 INJECTION INTRAMUSCULAR; INTRAVENOUS; SUBCUTANEOUS at 16:57

## 2018-01-21 RX ADMIN — ONDANSETRON 4 MILLIGRAM(S): 8 TABLET, FILM COATED ORAL at 02:54

## 2018-01-21 RX ADMIN — Medication 325 MILLIGRAM(S): at 06:59

## 2018-01-21 RX ADMIN — Medication 325 MILLIGRAM(S): at 13:09

## 2018-01-21 RX ADMIN — Medication 10 MILLIGRAM(S): at 18:44

## 2018-01-21 RX ADMIN — ENOXAPARIN SODIUM 40 MILLIGRAM(S): 100 INJECTION SUBCUTANEOUS at 18:43

## 2018-01-21 RX ADMIN — Medication 325 MILLIGRAM(S): at 19:19

## 2018-01-21 RX ADMIN — ESCITALOPRAM OXALATE 20 MILLIGRAM(S): 10 TABLET, FILM COATED ORAL at 21:15

## 2018-01-21 RX ADMIN — Medication 325 MILLIGRAM(S): at 23:07

## 2018-01-21 RX ADMIN — Medication 50 MILLIGRAM(S): at 15:34

## 2018-01-21 RX ADMIN — Medication 325 MILLIGRAM(S): at 03:54

## 2018-01-21 RX ADMIN — HYDROMORPHONE HYDROCHLORIDE 0.25 MILLIGRAM(S): 2 INJECTION INTRAMUSCULAR; INTRAVENOUS; SUBCUTANEOUS at 16:11

## 2018-01-21 RX ADMIN — Medication 50 MILLIGRAM(S): at 22:00

## 2018-01-21 RX ADMIN — Medication 5 MILLIGRAM(S): at 21:15

## 2018-01-21 RX ADMIN — Medication 325 MILLIGRAM(S): at 12:40

## 2018-01-21 RX ADMIN — NYSTATIN CREAM 1 APPLICATION(S): 100000 CREAM TOPICAL at 18:45

## 2018-01-21 RX ADMIN — GABAPENTIN 100 MILLIGRAM(S): 400 CAPSULE ORAL at 05:18

## 2018-01-21 RX ADMIN — Medication 325 MILLIGRAM(S): at 02:54

## 2018-01-21 RX ADMIN — NYSTATIN CREAM 1 APPLICATION(S): 100000 CREAM TOPICAL at 05:19

## 2018-01-21 RX ADMIN — Medication 50 MILLIGRAM(S): at 10:07

## 2018-01-21 RX ADMIN — LOSARTAN POTASSIUM 50 MILLIGRAM(S): 100 TABLET, FILM COATED ORAL at 05:19

## 2018-01-21 RX ADMIN — Medication 325 MILLIGRAM(S): at 19:07

## 2018-01-21 RX ADMIN — PANTOPRAZOLE SODIUM 40 MILLIGRAM(S): 20 TABLET, DELAYED RELEASE ORAL at 05:18

## 2018-01-21 RX ADMIN — Medication 10 MILLIGRAM(S): at 05:18

## 2018-01-21 RX ADMIN — GABAPENTIN 100 MILLIGRAM(S): 400 CAPSULE ORAL at 18:43

## 2018-01-21 RX ADMIN — I.V. FAT EMULSION 20.83 GRAM(S): 20 EMULSION INTRAVENOUS at 21:16

## 2018-01-21 RX ADMIN — ONDANSETRON 4 MILLIGRAM(S): 8 TABLET, FILM COATED ORAL at 12:40

## 2018-01-21 NOTE — PROGRESS NOTE ADULT - SUBJECTIVE AND OBJECTIVE BOX
O/N: VSS. BRAYDEN  1/20: BRAYDEN      Vital Signs Last 24 Hrs  T(C): 37.2 (21 Jan 2018 05:48), Max: 37.2 (20 Jan 2018 17:22)  T(F): 99 (21 Jan 2018 05:48), Max: 99 (20 Jan 2018 17:22)  HR: 70 (21 Jan 2018 05:48) (64 - 81)  BP: 140/88 (21 Jan 2018 05:48) (127/83 - 149/83)  BP(mean): --  RR: 16 (21 Jan 2018 05:48) (16 - 18)  SpO2: 95% (21 Jan 2018 05:48) (93% - 97%)      I&O's Summary    19 Jan 2018 07:01  -  20 Jan 2018 07:00  --------------------------------------------------------  IN: 260.2 mL / OUT: 1300 mL / NET: -1039.8 mL    20 Jan 2018 07:01  -  21 Jan 2018 05:55  --------------------------------------------------------  IN: 1679 mL / OUT: 275 mL / NET: 1404 mL        Gen: NAD  Abd: soft, nt / nd   vac in place

## 2018-01-21 NOTE — PROGRESS NOTE ADULT - SUBJECTIVE AND OBJECTIVE BOX
On interval followup, the left subclavian venous catheter site is clean, dry, non-inflamed and non-tender.  T 99 range. Notes and cultures are followed. The biopatch dressing change was discussed   with the nurse.

## 2018-01-22 LAB
ANION GAP SERPL CALC-SCNC: 9 MMOL/L — SIGNIFICANT CHANGE UP (ref 5–17)
BUN SERPL-MCNC: 28 MG/DL — HIGH (ref 7–23)
CALCIUM SERPL-MCNC: 9.5 MG/DL — SIGNIFICANT CHANGE UP (ref 8.4–10.5)
CHLORIDE SERPL-SCNC: 101 MMOL/L — SIGNIFICANT CHANGE UP (ref 96–108)
CO2 SERPL-SCNC: 30 MMOL/L — SIGNIFICANT CHANGE UP (ref 22–31)
CREAT SERPL-MCNC: 0.61 MG/DL — SIGNIFICANT CHANGE UP (ref 0.5–1.3)
GLUCOSE BLDC GLUCOMTR-MCNC: 104 MG/DL — HIGH (ref 70–99)
GLUCOSE BLDC GLUCOMTR-MCNC: 111 MG/DL — HIGH (ref 70–99)
GLUCOSE BLDC GLUCOMTR-MCNC: 116 MG/DL — HIGH (ref 70–99)
GLUCOSE BLDC GLUCOMTR-MCNC: 121 MG/DL — HIGH (ref 70–99)
GLUCOSE BLDC GLUCOMTR-MCNC: 133 MG/DL — HIGH (ref 70–99)
GLUCOSE SERPL-MCNC: 134 MG/DL — HIGH (ref 70–99)
MAGNESIUM SERPL-MCNC: 2 MG/DL — SIGNIFICANT CHANGE UP (ref 1.6–2.6)
PHOSPHATE SERPL-MCNC: 3.1 MG/DL — SIGNIFICANT CHANGE UP (ref 2.5–4.5)
POTASSIUM SERPL-MCNC: 4.3 MMOL/L — SIGNIFICANT CHANGE UP (ref 3.5–5.3)
POTASSIUM SERPL-SCNC: 4.3 MMOL/L — SIGNIFICANT CHANGE UP (ref 3.5–5.3)
SODIUM SERPL-SCNC: 140 MMOL/L — SIGNIFICANT CHANGE UP (ref 135–145)

## 2018-01-22 RX ORDER — ACETAMINOPHEN 500 MG
1000 TABLET ORAL ONCE
Qty: 0 | Refills: 0 | Status: COMPLETED | OUTPATIENT
Start: 2018-01-22 | End: 2018-01-22

## 2018-01-22 RX ORDER — ELECTROLYTE SOLUTION,INJ
1 VIAL (ML) INTRAVENOUS
Qty: 0 | Refills: 0 | Status: DISCONTINUED | OUTPATIENT
Start: 2018-01-22 | End: 2018-01-22

## 2018-01-22 RX ADMIN — PANTOPRAZOLE SODIUM 40 MILLIGRAM(S): 20 TABLET, DELAYED RELEASE ORAL at 05:10

## 2018-01-22 RX ADMIN — ONDANSETRON 4 MILLIGRAM(S): 8 TABLET, FILM COATED ORAL at 11:14

## 2018-01-22 RX ADMIN — CALCITRIOL 1 MICROGRAM(S): 0.5 CAPSULE ORAL at 11:14

## 2018-01-22 RX ADMIN — Medication 400 MILLIGRAM(S): at 12:10

## 2018-01-22 RX ADMIN — ENOXAPARIN SODIUM 40 MILLIGRAM(S): 100 INJECTION SUBCUTANEOUS at 17:30

## 2018-01-22 RX ADMIN — Medication 325 MILLIGRAM(S): at 00:07

## 2018-01-22 RX ADMIN — OCTREOTIDE ACETATE 400 MICROGRAM(S): 200 INJECTION, SOLUTION INTRAVENOUS; SUBCUTANEOUS at 11:14

## 2018-01-22 RX ADMIN — Medication 50 MILLIGRAM(S): at 23:17

## 2018-01-22 RX ADMIN — ENOXAPARIN SODIUM 40 MILLIGRAM(S): 100 INJECTION SUBCUTANEOUS at 05:12

## 2018-01-22 RX ADMIN — Medication 1000 MILLIGRAM(S): at 14:07

## 2018-01-22 RX ADMIN — SODIUM CHLORIDE 10 MILLILITER(S): 9 INJECTION INTRAMUSCULAR; INTRAVENOUS; SUBCUTANEOUS at 18:33

## 2018-01-22 RX ADMIN — Medication 5 MILLIGRAM(S): at 21:58

## 2018-01-22 RX ADMIN — LOSARTAN POTASSIUM 50 MILLIGRAM(S): 100 TABLET, FILM COATED ORAL at 05:10

## 2018-01-22 RX ADMIN — Medication 1000 MILLIGRAM(S): at 23:23

## 2018-01-22 RX ADMIN — Medication 10 MILLIGRAM(S): at 05:11

## 2018-01-22 RX ADMIN — Medication 50 MILLIGRAM(S): at 11:15

## 2018-01-22 RX ADMIN — ONDANSETRON 4 MILLIGRAM(S): 8 TABLET, FILM COATED ORAL at 01:07

## 2018-01-22 RX ADMIN — Medication 325 MILLIGRAM(S): at 05:11

## 2018-01-22 RX ADMIN — Medication 10 MILLIGRAM(S): at 17:30

## 2018-01-22 RX ADMIN — Medication 50 MILLIGRAM(S): at 17:29

## 2018-01-22 RX ADMIN — Medication 400 MILLIGRAM(S): at 22:39

## 2018-01-22 RX ADMIN — GABAPENTIN 100 MILLIGRAM(S): 400 CAPSULE ORAL at 17:30

## 2018-01-22 RX ADMIN — NYSTATIN CREAM 1 APPLICATION(S): 100000 CREAM TOPICAL at 05:12

## 2018-01-22 RX ADMIN — ONDANSETRON 4 MILLIGRAM(S): 8 TABLET, FILM COATED ORAL at 17:30

## 2018-01-22 RX ADMIN — NYSTATIN CREAM 1 APPLICATION(S): 100000 CREAM TOPICAL at 17:30

## 2018-01-22 RX ADMIN — Medication 325 MILLIGRAM(S): at 06:11

## 2018-01-22 RX ADMIN — Medication 50 MILLIGRAM(S): at 05:10

## 2018-01-22 RX ADMIN — Medication 100 UNIT(S): at 11:15

## 2018-01-22 RX ADMIN — ESCITALOPRAM OXALATE 20 MILLIGRAM(S): 10 TABLET, FILM COATED ORAL at 21:58

## 2018-01-22 RX ADMIN — GABAPENTIN 100 MILLIGRAM(S): 400 CAPSULE ORAL at 05:10

## 2018-01-22 RX ADMIN — Medication 73 EACH: at 18:34

## 2018-01-22 NOTE — PROGRESS NOTE ADULT - SUBJECTIVE AND OBJECTIVE BOX
O/N: BRAYDEN  1/21: BRAYDEN, dressing reinforced O/N: BRAYDEN  1/21: BRAYDEN, dressing reinforced    INTERVAL HPI/OVERNIGHT EVENTS:      SUBJECTIVE: Pt seen and examined at bedside. no acute complaints  Flatus: [ ] YES [x ] NO             Bowel Movement: [ ] YES [x ] NO  Pain (0-10):            Pain Control Adequate: [x ] YES [ ] NO  Nausea: [ ] YES [x ] NO            Vomiting: [ ] YES [x ] NO  Diarrhea: [ ] YES [x ] NO         Constipation: [ ] YES [x ] NO     Chest Pain: [ ] YES [x ] NO    SOB:  [ ] YES [x ] NO    MEDICATIONS  (STANDING):  bismuth subsalicylate Liquid 30 milliLiter(s) Oral daily  calcitriol Injectable 1 MICROGram(s) IV Push <User Schedule>  collagenase Ointment 1 Application(s) Topical daily  cyanocobalamin Injectable 1000 MICROGram(s) SubCutaneous every 7 days  dextrose 5%. 1000 milliLiter(s) (50 mL/Hr) IV Continuous <Continuous>  dextrose 50% Injectable 25 Gram(s) IV Push once  dextrose 50% Injectable 12.5 Gram(s) IV Push once  diazepam    Tablet 5 milliGRAM(s) Oral at bedtime  diphenhydrAMINE   Injectable 50 milliGRAM(s) IV Push every 6 hours  enoxaparin Injectable 40 milliGRAM(s) SubCutaneous two times a day  escitalopram 20 milliGRAM(s) Oral daily  gabapentin 100 milliGRAM(s) Oral two times a day  heparin  flush 100 Units/mL Injectable 100 Unit(s) IV Push every other day  insulin lispro (HumaLOG) corrective regimen sliding scale   SubCutaneous every 6 hours  lidocaine   Patch 1 Patch Transdermal daily  losartan 50 milliGRAM(s) Oral daily  nystatin Powder 1 Application(s) Topical two times a day  octreotide  Injectable 450 MICROGram(s) IV Push daily  ondansetron   Disintegrating Tablet 8 milliGRAM(s) Oral once  oxybutynin 10 milliGRAM(s) Oral two times a day  pantoprazole  Injectable 40 milliGRAM(s) IV Push daily  Parenteral Nutrition - Adult 1 Each (73 mL/Hr) TPN Continuous <Continuous>  sodium chloride 0.9% lock flush 20 milliLiter(s) IV Push once    MEDICATIONS  (PRN):  acetaminophen   Tablet. 325 milliGRAM(s) Oral every 4 hours PRN Moderate Pain (4 - 6)  ondansetron Injectable 4 milliGRAM(s) IV Push every 6 hours PRN Nausea and/or Vomiting  sodium chloride 0.9% lock flush 10 milliLiter(s) IV Push every 1 hour PRN After each medication administration  sodium chloride 0.9% lock flush 10 milliLiter(s) IV Push every 12 hours PRN Lumen of catheter NOT used      Vital Signs Last 24 Hrs  T(C): 36.7 (22 Jan 2018 06:03), Max: 37.3 (21 Jan 2018 20:20)  T(F): 98 (22 Jan 2018 06:03), Max: 99.2 (21 Jan 2018 20:20)  HR: 64 (22 Jan 2018 06:03) (62 - 68)  BP: 143/87 (22 Jan 2018 06:03) (113/74 - 143/87)  BP(mean): --  RR: 17 (22 Jan 2018 06:03) (16 - 17)  SpO2: 96% (22 Jan 2018 06:03) (93% - 96%)    PHYSICAL EXAM:      General: NAD, resting comfortably in bed  C/V: NSR  Pulm: Nonlabored breathing, no respiratory distress  Abd: soft, NT/ND, granulating tissue around fistula fistula manager with small leak  Extrem: WWP, no edema, SCDs in place                  I&O's Detail    21 Jan 2018 07:01  -  22 Jan 2018 07:00  --------------------------------------------------------  IN:    Fat Emulsion 20%: 243.6 mL    TPN (Total Parenteral Nutrition): 1752 mL  Total IN: 1995.6 mL    OUT:    Drain: 325 mL  Total OUT: 325 mL    Total NET: 1670.6 mL          LABS:                        10.2   4.3   )-----------( 227      ( 21 Jan 2018 07:59 )             31.7     01-21    141  |  103  |  29<H>  ----------------------------<  140<H>  4.2   |  28  |  0.60    Ca    9.5      21 Jan 2018 07:59  Phos  3.4     01-21  Mg     2.0     01-21    TPro  6.9  /  Alb  2.9<L>  /  TBili  0.6  /  DBili  x   /  AST  26  /  ALT  42  /  AlkPhos  217<H>  01-21          RADIOLOGY & ADDITIONAL STUDIES:

## 2018-01-23 LAB
ANION GAP SERPL CALC-SCNC: 10 MMOL/L — SIGNIFICANT CHANGE UP (ref 5–17)
BUN SERPL-MCNC: 24 MG/DL — HIGH (ref 7–23)
CALCIUM SERPL-MCNC: 9.5 MG/DL — SIGNIFICANT CHANGE UP (ref 8.4–10.5)
CHLORIDE SERPL-SCNC: 101 MMOL/L — SIGNIFICANT CHANGE UP (ref 96–108)
CO2 SERPL-SCNC: 28 MMOL/L — SIGNIFICANT CHANGE UP (ref 22–31)
CREAT SERPL-MCNC: 0.67 MG/DL — SIGNIFICANT CHANGE UP (ref 0.5–1.3)
GLUCOSE BLDC GLUCOMTR-MCNC: 95 MG/DL — SIGNIFICANT CHANGE UP (ref 70–99)
GLUCOSE SERPL-MCNC: 102 MG/DL — HIGH (ref 70–99)
MAGNESIUM SERPL-MCNC: 2 MG/DL — SIGNIFICANT CHANGE UP (ref 1.6–2.6)
PHOSPHATE SERPL-MCNC: 3.6 MG/DL — SIGNIFICANT CHANGE UP (ref 2.5–4.5)
POTASSIUM SERPL-MCNC: 4.6 MMOL/L — SIGNIFICANT CHANGE UP (ref 3.5–5.3)
POTASSIUM SERPL-SCNC: 4.6 MMOL/L — SIGNIFICANT CHANGE UP (ref 3.5–5.3)
SODIUM SERPL-SCNC: 139 MMOL/L — SIGNIFICANT CHANGE UP (ref 135–145)

## 2018-01-23 RX ORDER — I.V. FAT EMULSION 20 G/100ML
0.5 EMULSION INTRAVENOUS
Qty: 49.95 | Refills: 0 | Status: DISCONTINUED | OUTPATIENT
Start: 2018-01-23 | End: 2018-01-23

## 2018-01-23 RX ORDER — ACETAMINOPHEN 500 MG
1000 TABLET ORAL ONCE
Qty: 0 | Refills: 0 | Status: COMPLETED | OUTPATIENT
Start: 2018-01-23 | End: 2018-01-23

## 2018-01-23 RX ORDER — ELECTROLYTE SOLUTION,INJ
1 VIAL (ML) INTRAVENOUS
Qty: 0 | Refills: 0 | Status: DISCONTINUED | OUTPATIENT
Start: 2018-01-23 | End: 2018-01-23

## 2018-01-23 RX ADMIN — NYSTATIN CREAM 1 APPLICATION(S): 100000 CREAM TOPICAL at 17:02

## 2018-01-23 RX ADMIN — PANTOPRAZOLE SODIUM 40 MILLIGRAM(S): 20 TABLET, DELAYED RELEASE ORAL at 06:03

## 2018-01-23 RX ADMIN — ENOXAPARIN SODIUM 40 MILLIGRAM(S): 100 INJECTION SUBCUTANEOUS at 06:03

## 2018-01-23 RX ADMIN — LIDOCAINE 1 PATCH: 4 CREAM TOPICAL at 19:21

## 2018-01-23 RX ADMIN — Medication 50 MILLIGRAM(S): at 06:03

## 2018-01-23 RX ADMIN — ENOXAPARIN SODIUM 40 MILLIGRAM(S): 100 INJECTION SUBCUTANEOUS at 17:01

## 2018-01-23 RX ADMIN — OCTREOTIDE ACETATE 450 MICROGRAM(S): 200 INJECTION, SOLUTION INTRAVENOUS; SUBCUTANEOUS at 11:11

## 2018-01-23 RX ADMIN — Medication 5 MILLIGRAM(S): at 21:13

## 2018-01-23 RX ADMIN — GABAPENTIN 100 MILLIGRAM(S): 400 CAPSULE ORAL at 17:01

## 2018-01-23 RX ADMIN — Medication 73 EACH: at 19:21

## 2018-01-23 RX ADMIN — I.V. FAT EMULSION 20.81 GM/KG/DAY: 20 EMULSION INTRAVENOUS at 21:14

## 2018-01-23 RX ADMIN — ONDANSETRON 4 MILLIGRAM(S): 8 TABLET, FILM COATED ORAL at 11:11

## 2018-01-23 RX ADMIN — ONDANSETRON 4 MILLIGRAM(S): 8 TABLET, FILM COATED ORAL at 17:01

## 2018-01-23 RX ADMIN — LIDOCAINE 1 PATCH: 4 CREAM TOPICAL at 06:03

## 2018-01-23 RX ADMIN — LOSARTAN POTASSIUM 50 MILLIGRAM(S): 100 TABLET, FILM COATED ORAL at 06:04

## 2018-01-23 RX ADMIN — Medication 50 MILLIGRAM(S): at 17:02

## 2018-01-23 RX ADMIN — Medication 50 MILLIGRAM(S): at 23:00

## 2018-01-23 RX ADMIN — GABAPENTIN 100 MILLIGRAM(S): 400 CAPSULE ORAL at 06:04

## 2018-01-23 RX ADMIN — Medication 1000 MILLIGRAM(S): at 23:00

## 2018-01-23 RX ADMIN — Medication 10 MILLIGRAM(S): at 06:04

## 2018-01-23 RX ADMIN — SODIUM CHLORIDE 10 MILLILITER(S): 9 INJECTION INTRAMUSCULAR; INTRAVENOUS; SUBCUTANEOUS at 17:02

## 2018-01-23 RX ADMIN — Medication 50 MILLIGRAM(S): at 11:11

## 2018-01-23 RX ADMIN — ONDANSETRON 4 MILLIGRAM(S): 8 TABLET, FILM COATED ORAL at 23:02

## 2018-01-23 RX ADMIN — SODIUM CHLORIDE 10 MILLILITER(S): 9 INJECTION INTRAMUSCULAR; INTRAVENOUS; SUBCUTANEOUS at 11:11

## 2018-01-23 RX ADMIN — ESCITALOPRAM OXALATE 20 MILLIGRAM(S): 10 TABLET, FILM COATED ORAL at 21:13

## 2018-01-23 RX ADMIN — ONDANSETRON 4 MILLIGRAM(S): 8 TABLET, FILM COATED ORAL at 02:43

## 2018-01-23 RX ADMIN — Medication 10 MILLIGRAM(S): at 17:01

## 2018-01-23 RX ADMIN — Medication 400 MILLIGRAM(S): at 22:20

## 2018-01-23 NOTE — CHART NOTE - NSCHARTNOTEFT_GEN_A_CORE
Admitting Diagnosis:   Patient is a 57y old  Female who presents with a chief complaint of enterocutaneous fistula (24 Jul 2017 14:15)      PAST MEDICAL & SURGICAL HISTORY:  Reflux  Obesity  DVT (deep venous thrombosis): 2013 neck  Diverticulitis  Hypertension  Elective surgery: abodominal wall surgery  dec 2016  Elective surgery: NOV 2016 gastric bypass revision  Gastric bypass status for obesity: gastric sleeve, 6/2012  S/P breast biopsy: 2011, left  S/P colon resection: 2011  S/P knee surgery: repair 2009, 2011  right; left  Umbilical hernia: 2000  S/P appendectomy: 1975  S/P tonsillectomy: 1967      Current Nutrition Order:  NPO except ice chips and water  >> TPN via central venous line:  1752 TV (73ml/hr); 355g D, 104g AA, 50g 20% lipids (2123 kcal, 2g/kg IBW pro, GIR 3.14 based on most recent standing wt)    PO Intake: Good (%) [   ]  Fair (50-75%) [   ] Poor (<25%) [   ] N/A   Per note in Patient Care order- pt allowed 3 hard candies/day    GI Issues: No reported GI distress at this time    Pain: No C/o pain.    Skin Integrity: sacrum un-stageable PU; sacral abrasion; abd wound-->ostomy manager to LIWS  Continuous vac changes until ready for Sx  Labs:   01-23    139  |  101  |  24<H>  ----------------------------<  102<H>  4.6   |  28  |  0.67    Ca    9.5      23 Jan 2018 06:05  Phos  3.6     01-23  Mg     2.0     01-23      CAPILLARY BLOOD GLUCOSE      POCT Blood Glucose.: 95 mg/dL (23 Jan 2018 05:52)  POCT Blood Glucose.: 116 mg/dL (22 Jan 2018 23:15)  POCT Blood Glucose.: 111 mg/dL (22 Jan 2018 17:23)      Medications:  MEDICATIONS  (STANDING):  bismuth subsalicylate Liquid 30 milliLiter(s) Oral daily  calcitriol Injectable 1 MICROGram(s) IV Push <User Schedule>  collagenase Ointment 1 Application(s) Topical daily  cyanocobalamin Injectable 1000 MICROGram(s) SubCutaneous every 7 days  dextrose 5%. 1000 milliLiter(s) (50 mL/Hr) IV Continuous <Continuous>  diazepam    Tablet 5 milliGRAM(s) Oral at bedtime  diphenhydrAMINE   Injectable 50 milliGRAM(s) IV Push every 6 hours  enoxaparin Injectable 40 milliGRAM(s) SubCutaneous two times a day  escitalopram 20 milliGRAM(s) Oral daily  fat emulsion (Plant Based) 20% Infusion 0.502 Gm/kG/Day (20.812 mL/Hr) IV Continuous <Continuous>  gabapentin 100 milliGRAM(s) Oral two times a day  heparin  flush 100 Units/mL Injectable 100 Unit(s) IV Push every other day  lidocaine   Patch 1 Patch Transdermal daily  losartan 50 milliGRAM(s) Oral daily  nystatin Powder 1 Application(s) Topical two times a day  octreotide  Injectable 450 MICROGram(s) IV Push daily  oxybutynin 10 milliGRAM(s) Oral two times a day  pantoprazole  Injectable 40 milliGRAM(s) IV Push daily  Parenteral Nutrition - Adult 1 Each (73 mL/Hr) TPN Continuous <Continuous>  Parenteral Nutrition - Adult 1 Each (73 mL/Hr) TPN Continuous <Continuous>  sodium chloride 0.9% lock flush 20 milliLiter(s) IV Push once    MEDICATIONS  (PRN):  ondansetron Injectable 4 milliGRAM(s) IV Push every 6 hours PRN Nausea and/or Vomiting  sodium chloride 0.9% lock flush 10 milliLiter(s) IV Push every 1 hour PRN After each medication administration  sodium chloride 0.9% lock flush 10 milliLiter(s) IV Push every 12 hours PRN Lumen of catheter NOT used      Weight:  1755.1 (1/8/18)  172.5 (12/19)  171.5lb (12/14)  167lb (11/13)  164lb (10/17)  219lb (8/1)    new standing wt taken 1/15 is 174.8lb    Weight Change:   03/2016: 89kg  02/2017: 74kg   07/2017: 76kg  11/2017: 76kg   1/2018: 79.5    Estimated energy needs:   Estimated energy needs using 52 kg IBW:  increased needs per wound and pressure ulcer healing. needs calculated based on TPN as primary route of nutrition.  30-35 kcal/kg (7133-4104 kcal).   1.8-2 g/kg ( g protein).  30-35 mL/kg (2071-3520 mL fluid).     Subjective:   s/p EGD 1/17, s/p repeat EGD showing healed mucosal defect. Surgical plan for revision 1/30.  NPO except for ice chips and water. Pt no longer allowed candy or Italian ice. Most recent standing wt obtained 1/15 as pt was doing PT in halls (174.8lbs).  Most recent pre-alb and triglyceride labs WNL. Protein now 2g/kg IBW.  BG trending 120-150. Consider decreasing Dex to 270g if wt continues to trend up. Fluids, micronutrients and lytes per MD discretion. RD to follow per policy    Previous Nutrition Diagnosis: Increased nutrient needs RT increased demand for nutrients AEB wound and pressure ulcer healing   Active [  X]  Resolved [   ]    If resolved, new PES: N/A    Goal: Pt to meet >75% EER via tolerated route    Recommendations:  1) Continue w/ current TPN order as tolerated and assess feasibility of other routes w/wound healing  2) Monitor pre-alb, BG/FS, triglycerides  3) Monitor triglycerides & adjust lipids per MD discretion -Please obtain new lab values.  4) Please continue to take standing weight for trending purposes  5) Consider OsCal tablet crushed w/ water/italian ice     Education: Understands meeting nutrient needs via TPN.    Risk Level: High [  X ] Moderate [   ] Low [   ]. Admitting Diagnosis:   Patient is a 57y old  Female who presents with a chief complaint of enterocutaneous fistula (24 Jul 2017 14:15)      PAST MEDICAL & SURGICAL HISTORY:  Reflux  Obesity  DVT (deep venous thrombosis): 2013 neck  Diverticulitis  Hypertension  Elective surgery: abodominal wall surgery  dec 2016  Elective surgery: NOV 2016 gastric bypass revision  Gastric bypass status for obesity: gastric sleeve, 6/2012  S/P breast biopsy: 2011, left  S/P colon resection: 2011  S/P knee surgery: repair 2009, 2011  right; left  Umbilical hernia: 2000  S/P appendectomy: 1975  S/P tonsillectomy: 1967      Current Nutrition Order:  NPO except ice chips and water  >> TPN via central venous line:  1752 TV (73ml/hr); 355g D, 104g AA, 50g 20% lipids (2123 kcal, 2g/kg IBW pro, GIR 3.14 based on most recent standing wt)    PO Intake: Good (%) [   ]  Fair (50-75%) [   ] Poor (<25%) [   ] N/A   Per note in Patient Care order- pt allowed 3 hard candies/day    GI Issues: No reported GI distress at this time  Adequate ostomy output demonstrated per GI note    Pain: No C/o pain.    Skin Integrity: sacrum un-stageable PU; sacral abrasion; abd wound-->ostomy manager to LIWS  Continuous vac changes until ready for Sx  Labs:   01-23    139  |  101  |  24<H>  ----------------------------<  102<H>  4.6   |  28  |  0.67    Ca    9.5      23 Jan 2018 06:05  Phos  3.6     01-23  Mg     2.0     01-23      CAPILLARY BLOOD GLUCOSE      POCT Blood Glucose.: 95 mg/dL (23 Jan 2018 05:52)  POCT Blood Glucose.: 116 mg/dL (22 Jan 2018 23:15)  POCT Blood Glucose.: 111 mg/dL (22 Jan 2018 17:23)      Medications:  MEDICATIONS  (STANDING):  bismuth subsalicylate Liquid 30 milliLiter(s) Oral daily  calcitriol Injectable 1 MICROGram(s) IV Push <User Schedule>  collagenase Ointment 1 Application(s) Topical daily  cyanocobalamin Injectable 1000 MICROGram(s) SubCutaneous every 7 days  dextrose 5%. 1000 milliLiter(s) (50 mL/Hr) IV Continuous <Continuous>  diazepam    Tablet 5 milliGRAM(s) Oral at bedtime  diphenhydrAMINE   Injectable 50 milliGRAM(s) IV Push every 6 hours  enoxaparin Injectable 40 milliGRAM(s) SubCutaneous two times a day  escitalopram 20 milliGRAM(s) Oral daily  fat emulsion (Plant Based) 20% Infusion 0.502 Gm/kG/Day (20.812 mL/Hr) IV Continuous <Continuous>  gabapentin 100 milliGRAM(s) Oral two times a day  heparin  flush 100 Units/mL Injectable 100 Unit(s) IV Push every other day  lidocaine   Patch 1 Patch Transdermal daily  losartan 50 milliGRAM(s) Oral daily  nystatin Powder 1 Application(s) Topical two times a day  octreotide  Injectable 450 MICROGram(s) IV Push daily  oxybutynin 10 milliGRAM(s) Oral two times a day  pantoprazole  Injectable 40 milliGRAM(s) IV Push daily  Parenteral Nutrition - Adult 1 Each (73 mL/Hr) TPN Continuous <Continuous>  Parenteral Nutrition - Adult 1 Each (73 mL/Hr) TPN Continuous <Continuous>  sodium chloride 0.9% lock flush 20 milliLiter(s) IV Push once    MEDICATIONS  (PRN):  ondansetron Injectable 4 milliGRAM(s) IV Push every 6 hours PRN Nausea and/or Vomiting  sodium chloride 0.9% lock flush 10 milliLiter(s) IV Push every 1 hour PRN After each medication administration  sodium chloride 0.9% lock flush 10 milliLiter(s) IV Push every 12 hours PRN Lumen of catheter NOT used      Weight:  1755.1 (1/8/18)  172.5 (12/19)  171.5lb (12/14)  167lb (11/13)  164lb (10/17)  219lb (8/1)    new standing wt taken 1/15 is 174.8lb    Weight Change:   03/2016: 89kg  02/2017: 74kg   07/2017: 76kg  11/2017: 76kg   1/2018: 79.5    Estimated energy needs:   Estimated energy needs using 52 kg IBW:  increased needs per wound and pressure ulcer healing. needs calculated based on TPN as primary route of nutrition.  30-35 kcal/kg (7770-4876 kcal).   1.8-2 g/kg ( g protein).  30-35 mL/kg (0821-0934 mL fluid).     Subjective:   s/p repeat EGD showing healed mucosal defect at EJ junction. Surgical plan for revision 1/30.  NPO except for ice chips and water. Pt no longer allowed candy or Italian ice. Most recent standing wt obtained 1/15 as pt was doing PT in halls (174.8lbs).  Most recent pre-alb and triglyceride labs WNL. Protein now 2g/kg IBW.  BG trending 120-150. Consider decreasing Dex to 270g if wt continues to trend up. Fluids, micronutrients and lytes per MD discretion. RD to follow per policy    Previous Nutrition Diagnosis: Increased nutrient needs RT increased demand for nutrients AEB wound and pressure ulcer healing   Active [  X]  Resolved [   ]    If resolved, new PES: N/A    Goal: Pt to meet >75% EER via tolerated route    Recommendations:  1) Continue w/ current TPN order as tolerated and assess feasibility of other routes w/wound healing  2) Monitor pre-alb, BG/FS, triglycerides  3) Monitor triglycerides & adjust lipids per MD discretion -Please obtain new lab values.  4) Please continue to take standing weight for trending purposes  5) Consider OsCal tablet crushed w/ water/italian ice     Education: Understands meeting nutrient needs via TPN.    Risk Level: High [  X ] Moderate [   ] Low [   ].

## 2018-01-23 NOTE — PROGRESS NOTE ADULT - SUBJECTIVE AND OBJECTIVE BOX
O/N: BRAYDEN  1/22 BRAYDEN fistula manager changed. OVERNIGHT EVENTS:  BRAYDEN  1/22 BRAYDEN fistula manager changed.       SUBJECTIVE: Patient denies any complaints.  Flatus: [] YES [X] NO             Bowel Movement: [ ] YES [X ] NO  Pain (0-10):            Pain Control Adequate: [ X] YES [ ] NO  Nausea: [ ] YES [X ] NO            Vomiting: [ ] YES [ X] NO  Diarrhea: [ ] YES [X ] NO         Constipation: [ ] YES [X ] NO     Chest Pain: [ ] YES [ X] NO    SOB:  [ ] YES [ X] NO    enoxaparin Injectable 40 milliGRAM(s) SubCutaneous two times a day  heparin  flush 100 Units/mL Injectable 100 Unit(s) IV Push every other day  losartan 50 milliGRAM(s) Oral daily      Vital Signs Last 24 Hrs  T(C): 36.7 (23 Jan 2018 05:09), Max: 37.6 (22 Jan 2018 17:48)  T(F): 98 (23 Jan 2018 05:09), Max: 99.6 (22 Jan 2018 17:48)  HR: 60 (23 Jan 2018 05:09) (60 - 83)  BP: 146/92 (23 Jan 2018 05:09) (120/79 - 146/92)  BP(mean): --  RR: 17 (23 Jan 2018 05:09) (17 - 18)  SpO2: 96% (23 Jan 2018 05:09) (94% - 96%)  I&O's Detail    22 Jan 2018 07:01  -  23 Jan 2018 07:00  --------------------------------------------------------  IN:    Oral Fluid: 120 mL    TPN (Total Parenteral Nutrition): 511 mL  Total IN: 631 mL    OUT:  Total OUT: 0 mL    Total NET: 631 mL          General: NAD, resting comfortably in bed  C/V: NSR  Pulm: Nonlabored breathing, no respiratory distress  Abd: soft, non distended , granulating tissue around fistula   Extrem: WWP, no edema, SCDs in place        LABS:                        10.2   4.3   )-----------( 227      ( 21 Jan 2018 07:59 )             31.7     01-23    139  |  101  |  24<H>  ----------------------------<  102<H>  4.6   |  28  |  0.67    Ca    9.5      23 Jan 2018 06:05  Phos  3.6     01-23  Mg     2.0     01-23    TPro  6.9  /  Alb  2.9<L>  /  TBili  0.6  /  DBili  x   /  AST  26  /  ALT  42  /  AlkPhos  217<H>  01-21

## 2018-01-24 LAB
ANION GAP SERPL CALC-SCNC: 9 MMOL/L — SIGNIFICANT CHANGE UP (ref 5–17)
BUN SERPL-MCNC: 25 MG/DL — HIGH (ref 7–23)
CALCIUM SERPL-MCNC: 9.4 MG/DL — SIGNIFICANT CHANGE UP (ref 8.4–10.5)
CHLORIDE SERPL-SCNC: 100 MMOL/L — SIGNIFICANT CHANGE UP (ref 96–108)
CO2 SERPL-SCNC: 30 MMOL/L — SIGNIFICANT CHANGE UP (ref 22–31)
CREAT SERPL-MCNC: 0.63 MG/DL — SIGNIFICANT CHANGE UP (ref 0.5–1.3)
GLUCOSE BLDC GLUCOMTR-MCNC: 144 MG/DL — HIGH (ref 70–99)
GLUCOSE SERPL-MCNC: 136 MG/DL — HIGH (ref 70–99)
POTASSIUM SERPL-MCNC: 4.1 MMOL/L — SIGNIFICANT CHANGE UP (ref 3.5–5.3)
POTASSIUM SERPL-SCNC: 4.1 MMOL/L — SIGNIFICANT CHANGE UP (ref 3.5–5.3)
SODIUM SERPL-SCNC: 139 MMOL/L — SIGNIFICANT CHANGE UP (ref 135–145)

## 2018-01-24 RX ORDER — ELECTROLYTE SOLUTION,INJ
1 VIAL (ML) INTRAVENOUS
Qty: 0 | Refills: 0 | Status: DISCONTINUED | OUTPATIENT
Start: 2018-01-24 | End: 2018-01-24

## 2018-01-24 RX ORDER — ACETAMINOPHEN 500 MG
1000 TABLET ORAL ONCE
Qty: 0 | Refills: 0 | Status: COMPLETED | OUTPATIENT
Start: 2018-01-24 | End: 2018-01-24

## 2018-01-24 RX ADMIN — NYSTATIN CREAM 1 APPLICATION(S): 100000 CREAM TOPICAL at 18:14

## 2018-01-24 RX ADMIN — Medication 73 EACH: at 20:42

## 2018-01-24 RX ADMIN — NYSTATIN CREAM 1 APPLICATION(S): 100000 CREAM TOPICAL at 06:00

## 2018-01-24 RX ADMIN — Medication 10 MILLIGRAM(S): at 05:39

## 2018-01-24 RX ADMIN — OCTREOTIDE ACETATE 450 MICROGRAM(S): 200 INJECTION, SOLUTION INTRAVENOUS; SUBCUTANEOUS at 11:42

## 2018-01-24 RX ADMIN — Medication 400 MILLIGRAM(S): at 16:45

## 2018-01-24 RX ADMIN — Medication 1000 MILLIGRAM(S): at 17:05

## 2018-01-24 RX ADMIN — Medication 50 MILLIGRAM(S): at 18:14

## 2018-01-24 RX ADMIN — LOSARTAN POTASSIUM 50 MILLIGRAM(S): 100 TABLET, FILM COATED ORAL at 05:39

## 2018-01-24 RX ADMIN — Medication 100 UNIT(S): at 11:43

## 2018-01-24 RX ADMIN — PANTOPRAZOLE SODIUM 40 MILLIGRAM(S): 20 TABLET, DELAYED RELEASE ORAL at 05:39

## 2018-01-24 RX ADMIN — ENOXAPARIN SODIUM 40 MILLIGRAM(S): 100 INJECTION SUBCUTANEOUS at 06:00

## 2018-01-24 RX ADMIN — CALCITRIOL 1 MICROGRAM(S): 0.5 CAPSULE ORAL at 11:44

## 2018-01-24 RX ADMIN — Medication 10 MILLIGRAM(S): at 20:07

## 2018-01-24 RX ADMIN — Medication 5 MILLIGRAM(S): at 22:39

## 2018-01-24 RX ADMIN — ESCITALOPRAM OXALATE 20 MILLIGRAM(S): 10 TABLET, FILM COATED ORAL at 22:39

## 2018-01-24 RX ADMIN — ONDANSETRON 4 MILLIGRAM(S): 8 TABLET, FILM COATED ORAL at 05:40

## 2018-01-24 RX ADMIN — ENOXAPARIN SODIUM 40 MILLIGRAM(S): 100 INJECTION SUBCUTANEOUS at 18:14

## 2018-01-24 RX ADMIN — Medication 50 MILLIGRAM(S): at 05:40

## 2018-01-24 RX ADMIN — GABAPENTIN 100 MILLIGRAM(S): 400 CAPSULE ORAL at 18:14

## 2018-01-24 RX ADMIN — Medication 50 MILLIGRAM(S): at 11:43

## 2018-01-24 RX ADMIN — GABAPENTIN 100 MILLIGRAM(S): 400 CAPSULE ORAL at 05:39

## 2018-01-24 NOTE — PROGRESS NOTE ADULT - SUBJECTIVE AND OBJECTIVE BOX
O/N: BRAYDEN  1/23: BRAYDEN ROBERTS OVERNIGHT EVENTS: BRAYDEN  1/23: VSS, BRAYDEN       SUBJECTIVE: Patient examined bedside denies any complaints   Flatus: [] YES [X] NO             Bowel Movement: [ ] YES [ X] NO  Pain (0-10):            Pain Control Adequate: [X ] YES [ ] NO  Nausea: [ ] YES [ X] NO            Vomiting: [ ] YES [X ] NO  Diarrhea: [ ] YES [X ] NO         Constipation: [ ] YES [X ] NO     Chest Pain: [ ] YES [X ] NO    SOB:  [ ] YES X[ ] NO    enoxaparin Injectable 40 milliGRAM(s) SubCutaneous two times a day  heparin  flush 100 Units/mL Injectable 100 Unit(s) IV Push every other day  losartan 50 milliGRAM(s) Oral daily      Vital Signs Last 24 Hrs  T(C): 36.6 (24 Jan 2018 06:01), Max: 37.6 (23 Jan 2018 20:00)  T(F): 97.9 (24 Jan 2018 06:01), Max: 99.6 (23 Jan 2018 20:00)  HR: 68 (24 Jan 2018 06:01) (68 - 77)  BP: 152/86 (24 Jan 2018 06:01) (138/85 - 152/86)  BP(mean): --  RR: 16 (24 Jan 2018 06:01) (16 - 17)  SpO2: 95% (24 Jan 2018 06:01) (95% - 97%)  I&O's Detail    23 Jan 2018 07:01  -  24 Jan 2018 07:00  --------------------------------------------------------  IN:    Fat Emulsion 20%: 228.8 mL    Solution: 100 mL    TPN (Total Parenteral Nutrition): 876 mL  Total IN: 1204.8 mL    OUT:    Drain: 75 mL  Total OUT: 75 mL    Total NET: 1129.8 mL          General: NAD, resting comfortably in bed  C/V: NSR  Pulm: Nonlabored breathing, no respiratory distress  Abd: soft, NT/ND, granulating tissue around fistulas.  Extrem: WWP, no edema, SCDs in place        LABS:    01-23    139  |  101  |  24<H>  ----------------------------<  102<H>  4.6   |  28  |  0.67    Ca    9.5      23 Jan 2018 06:05  Phos  3.6     01-23  Mg     2.0     01-23            RADIOLOGY & ADDITIONAL STUDIES:

## 2018-01-25 LAB
ANION GAP SERPL CALC-SCNC: 9 MMOL/L — SIGNIFICANT CHANGE UP (ref 5–17)
BUN SERPL-MCNC: 28 MG/DL — HIGH (ref 7–23)
CALCIUM SERPL-MCNC: 9.5 MG/DL — SIGNIFICANT CHANGE UP (ref 8.4–10.5)
CHLORIDE SERPL-SCNC: 100 MMOL/L — SIGNIFICANT CHANGE UP (ref 96–108)
CO2 SERPL-SCNC: 29 MMOL/L — SIGNIFICANT CHANGE UP (ref 22–31)
CREAT SERPL-MCNC: 0.6 MG/DL — SIGNIFICANT CHANGE UP (ref 0.5–1.3)
GLUCOSE BLDC GLUCOMTR-MCNC: 137 MG/DL — HIGH (ref 70–99)
GLUCOSE SERPL-MCNC: 122 MG/DL — HIGH (ref 70–99)
POTASSIUM SERPL-MCNC: 4.2 MMOL/L — SIGNIFICANT CHANGE UP (ref 3.5–5.3)
POTASSIUM SERPL-SCNC: 4.2 MMOL/L — SIGNIFICANT CHANGE UP (ref 3.5–5.3)
SODIUM SERPL-SCNC: 138 MMOL/L — SIGNIFICANT CHANGE UP (ref 135–145)

## 2018-01-25 RX ORDER — ACETAMINOPHEN 500 MG
1000 TABLET ORAL ONCE
Qty: 0 | Refills: 0 | Status: COMPLETED | OUTPATIENT
Start: 2018-01-25 | End: 2018-01-25

## 2018-01-25 RX ORDER — I.V. FAT EMULSION 20 G/100ML
0.5 EMULSION INTRAVENOUS
Qty: 50 | Refills: 0 | Status: DISCONTINUED | OUTPATIENT
Start: 2018-01-25 | End: 2018-01-25

## 2018-01-25 RX ORDER — DIAZEPAM 5 MG
5 TABLET ORAL AT BEDTIME
Qty: 0 | Refills: 0 | Status: DISCONTINUED | OUTPATIENT
Start: 2018-01-25 | End: 2018-01-30

## 2018-01-25 RX ORDER — ELECTROLYTE SOLUTION,INJ
1 VIAL (ML) INTRAVENOUS
Qty: 0 | Refills: 0 | Status: DISCONTINUED | OUTPATIENT
Start: 2018-01-25 | End: 2018-01-25

## 2018-01-25 RX ADMIN — Medication 50 MILLIGRAM(S): at 05:37

## 2018-01-25 RX ADMIN — Medication 50 MILLIGRAM(S): at 11:34

## 2018-01-25 RX ADMIN — LIDOCAINE 1 PATCH: 4 CREAM TOPICAL at 05:36

## 2018-01-25 RX ADMIN — Medication 50 MILLIGRAM(S): at 00:43

## 2018-01-25 RX ADMIN — GABAPENTIN 100 MILLIGRAM(S): 400 CAPSULE ORAL at 05:37

## 2018-01-25 RX ADMIN — LOSARTAN POTASSIUM 50 MILLIGRAM(S): 100 TABLET, FILM COATED ORAL at 05:37

## 2018-01-25 RX ADMIN — Medication 73 EACH: at 18:20

## 2018-01-25 RX ADMIN — Medication 50 MILLIGRAM(S): at 23:15

## 2018-01-25 RX ADMIN — PREGABALIN 1000 MICROGRAM(S): 225 CAPSULE ORAL at 11:34

## 2018-01-25 RX ADMIN — OCTREOTIDE ACETATE 450 MICROGRAM(S): 200 INJECTION, SOLUTION INTRAVENOUS; SUBCUTANEOUS at 11:35

## 2018-01-25 RX ADMIN — Medication 400 MILLIGRAM(S): at 05:36

## 2018-01-25 RX ADMIN — Medication 10 MILLIGRAM(S): at 05:36

## 2018-01-25 RX ADMIN — NYSTATIN CREAM 1 APPLICATION(S): 100000 CREAM TOPICAL at 05:38

## 2018-01-25 RX ADMIN — PANTOPRAZOLE SODIUM 40 MILLIGRAM(S): 20 TABLET, DELAYED RELEASE ORAL at 05:37

## 2018-01-25 RX ADMIN — ONDANSETRON 4 MILLIGRAM(S): 8 TABLET, FILM COATED ORAL at 23:15

## 2018-01-25 RX ADMIN — ENOXAPARIN SODIUM 40 MILLIGRAM(S): 100 INJECTION SUBCUTANEOUS at 17:52

## 2018-01-25 RX ADMIN — ESCITALOPRAM OXALATE 20 MILLIGRAM(S): 10 TABLET, FILM COATED ORAL at 23:14

## 2018-01-25 RX ADMIN — LIDOCAINE 1 PATCH: 4 CREAM TOPICAL at 19:06

## 2018-01-25 RX ADMIN — GABAPENTIN 100 MILLIGRAM(S): 400 CAPSULE ORAL at 17:53

## 2018-01-25 RX ADMIN — NYSTATIN CREAM 1 APPLICATION(S): 100000 CREAM TOPICAL at 17:59

## 2018-01-25 RX ADMIN — Medication 5 MILLIGRAM(S): at 23:14

## 2018-01-25 RX ADMIN — ENOXAPARIN SODIUM 40 MILLIGRAM(S): 100 INJECTION SUBCUTANEOUS at 05:36

## 2018-01-25 RX ADMIN — Medication 10 MILLIGRAM(S): at 17:53

## 2018-01-25 RX ADMIN — I.V. FAT EMULSION 20.83 GM/KG/DAY: 20 EMULSION INTRAVENOUS at 23:14

## 2018-01-25 RX ADMIN — Medication 50 MILLIGRAM(S): at 17:52

## 2018-01-25 NOTE — PROGRESS NOTE ADULT - SUBJECTIVE AND OBJECTIVE BOX
INTERVAL HPI/OVERNIGHT EVENTS: BRAYDEN    SUBJECTIVE: Pt seen and examined at bedside. No acute complaints   Flatus: [ ] YES [x ] NO             Bowel Movement: [ ] YES [x ] NO  Pain (0-10):            Pain Control Adequate: [ x] YES [ ] NO  Nausea: [ ] YES [x NO            Vomiting: [ ] YES [x ] NO  Diarrhea: [ ] YES [ x] NO         Constipation: [ ] YES x[ ] NO     Chest Pain: [ ] YES [x ] NO    SOB:  [ ] YES [x NO    MEDICATIONS  (STANDING):  bismuth subsalicylate Liquid 30 milliLiter(s) Oral daily  calcitriol Injectable 1 MICROGram(s) IV Push <User Schedule>  collagenase Ointment 1 Application(s) Topical daily  cyanocobalamin Injectable 1000 MICROGram(s) SubCutaneous every 7 days  dextrose 5%. 1000 milliLiter(s) (50 mL/Hr) IV Continuous <Continuous>  diazepam    Tablet 5 milliGRAM(s) Oral at bedtime  diphenhydrAMINE   Injectable 50 milliGRAM(s) IV Push every 6 hours  enoxaparin Injectable 40 milliGRAM(s) SubCutaneous two times a day  escitalopram 20 milliGRAM(s) Oral daily  gabapentin 100 milliGRAM(s) Oral two times a day  heparin  flush 100 Units/mL Injectable 100 Unit(s) IV Push every other day  lidocaine   Patch 1 Patch Transdermal daily  losartan 50 milliGRAM(s) Oral daily  nystatin Powder 1 Application(s) Topical two times a day  octreotide  Injectable 450 MICROGram(s) IV Push daily  oxybutynin 10 milliGRAM(s) Oral two times a day  pantoprazole  Injectable 40 milliGRAM(s) IV Push daily  Parenteral Nutrition - Adult 1 Each (73 mL/Hr) TPN Continuous <Continuous>  sodium chloride 0.9% lock flush 20 milliLiter(s) IV Push once    MEDICATIONS  (PRN):  ondansetron Injectable 4 milliGRAM(s) IV Push every 6 hours PRN Nausea and/or Vomiting  sodium chloride 0.9% lock flush 10 milliLiter(s) IV Push every 1 hour PRN After each medication administration  sodium chloride 0.9% lock flush 10 milliLiter(s) IV Push every 12 hours PRN Lumen of catheter NOT used      Vital Signs Last 24 Hrs  T(C): 36.6 (25 Jan 2018 05:54), Max: 37.4 (24 Jan 2018 14:34)  T(F): 97.9 (25 Jan 2018 05:54), Max: 99.3 (24 Jan 2018 14:34)  HR: 64 (25 Jan 2018 05:54) (64 - 77)  BP: 136/84 (25 Jan 2018 05:54) (125/64 - 159/96)  BP(mean): --  RR: 17 (25 Jan 2018 05:54) (16 - 17)  SpO2: 95% (25 Jan 2018 05:54) (93% - 97%)    PHYSICAL EXAM:      Constitutional: A&Ox3    Respiratory: non labored breathing, no respiratory distress    Cardiovascular:  RRR    Gastrointestinal: Soft NT ND, fistula manager in place with good fxn    Genitourinary: Voids    Extremities: (-) edema                  I&O's Detail    23 Jan 2018 07:01  -  24 Jan 2018 07:00  --------------------------------------------------------  IN:    Fat Emulsion 20%: 228.8 mL    Solution: 100 mL    TPN (Total Parenteral Nutrition): 876 mL  Total IN: 1204.8 mL    OUT:    Drain: 75 mL  Total OUT: 75 mL    Total NET: 1129.8 mL      24 Jan 2018 07:01  -  25 Jan 2018 06:40  --------------------------------------------------------  IN:    Fat Emulsion 20%: 20.8 mL    Oral Fluid: 120 mL    TPN (Total Parenteral Nutrition): 657 mL  Total IN: 797.8 mL    OUT:    Drain: 300 mL  Total OUT: 300 mL    Total NET: 497.8 mL          LABS:    01-24    139  |  100  |  25<H>  ----------------------------<  136<H>  4.1   |  30  |  0.63    Ca    9.4      24 Jan 2018 08:28            RADIOLOGY & ADDITIONAL STUDIES:

## 2018-01-26 LAB
ALBUMIN SERPL ELPH-MCNC: 3.1 G/DL — LOW (ref 3.3–5)
ALP SERPL-CCNC: 227 U/L — HIGH (ref 40–120)
ALT FLD-CCNC: 39 U/L — SIGNIFICANT CHANGE UP (ref 10–45)
ANION GAP SERPL CALC-SCNC: 9 MMOL/L — SIGNIFICANT CHANGE UP (ref 5–17)
AST SERPL-CCNC: 24 U/L — SIGNIFICANT CHANGE UP (ref 10–40)
BILIRUB SERPL-MCNC: 0.5 MG/DL — SIGNIFICANT CHANGE UP (ref 0.2–1.2)
BUN SERPL-MCNC: 26 MG/DL — HIGH (ref 7–23)
CALCIUM SERPL-MCNC: 9.8 MG/DL — SIGNIFICANT CHANGE UP (ref 8.4–10.5)
CHLORIDE SERPL-SCNC: 98 MMOL/L — SIGNIFICANT CHANGE UP (ref 96–108)
CO2 SERPL-SCNC: 28 MMOL/L — SIGNIFICANT CHANGE UP (ref 22–31)
CREAT SERPL-MCNC: 0.63 MG/DL — SIGNIFICANT CHANGE UP (ref 0.5–1.3)
GLUCOSE SERPL-MCNC: 134 MG/DL — HIGH (ref 70–99)
MAGNESIUM SERPL-MCNC: 2 MG/DL — SIGNIFICANT CHANGE UP (ref 1.6–2.6)
PHOSPHATE SERPL-MCNC: 3.4 MG/DL — SIGNIFICANT CHANGE UP (ref 2.5–4.5)
POTASSIUM SERPL-MCNC: 4.2 MMOL/L — SIGNIFICANT CHANGE UP (ref 3.5–5.3)
POTASSIUM SERPL-SCNC: 4.2 MMOL/L — SIGNIFICANT CHANGE UP (ref 3.5–5.3)
PREALB SERPL-MCNC: 32 MG/DL — SIGNIFICANT CHANGE UP (ref 20–40)
PROT SERPL-MCNC: 7.2 G/DL — SIGNIFICANT CHANGE UP (ref 6–8.3)
SODIUM SERPL-SCNC: 135 MMOL/L — SIGNIFICANT CHANGE UP (ref 135–145)
TRIGL SERPL-MCNC: 163 MG/DL — HIGH (ref 10–149)

## 2018-01-26 PROCEDURE — 99254 IP/OBS CNSLTJ NEW/EST MOD 60: CPT | Mod: GC

## 2018-01-26 RX ORDER — ACETAMINOPHEN 500 MG
1000 TABLET ORAL ONCE
Qty: 0 | Refills: 0 | Status: COMPLETED | OUTPATIENT
Start: 2018-01-26 | End: 2018-01-26

## 2018-01-26 RX ORDER — ELECTROLYTE SOLUTION,INJ
1 VIAL (ML) INTRAVENOUS
Qty: 0 | Refills: 0 | Status: DISCONTINUED | OUTPATIENT
Start: 2018-01-26 | End: 2018-01-26

## 2018-01-26 RX ADMIN — Medication 1 EACH: at 17:25

## 2018-01-26 RX ADMIN — ENOXAPARIN SODIUM 40 MILLIGRAM(S): 100 INJECTION SUBCUTANEOUS at 17:25

## 2018-01-26 RX ADMIN — PANTOPRAZOLE SODIUM 40 MILLIGRAM(S): 20 TABLET, DELAYED RELEASE ORAL at 05:43

## 2018-01-26 RX ADMIN — ENOXAPARIN SODIUM 40 MILLIGRAM(S): 100 INJECTION SUBCUTANEOUS at 05:43

## 2018-01-26 RX ADMIN — LOSARTAN POTASSIUM 50 MILLIGRAM(S): 100 TABLET, FILM COATED ORAL at 05:43

## 2018-01-26 RX ADMIN — NYSTATIN CREAM 1 APPLICATION(S): 100000 CREAM TOPICAL at 05:44

## 2018-01-26 RX ADMIN — Medication 50 MILLIGRAM(S): at 17:25

## 2018-01-26 RX ADMIN — Medication 50 MILLIGRAM(S): at 23:18

## 2018-01-26 RX ADMIN — Medication 50 MILLIGRAM(S): at 11:06

## 2018-01-26 RX ADMIN — Medication 10 MILLIGRAM(S): at 05:43

## 2018-01-26 RX ADMIN — CALCITRIOL 1 MICROGRAM(S): 0.5 CAPSULE ORAL at 11:06

## 2018-01-26 RX ADMIN — Medication 1000 MILLIGRAM(S): at 09:50

## 2018-01-26 RX ADMIN — ESCITALOPRAM OXALATE 20 MILLIGRAM(S): 10 TABLET, FILM COATED ORAL at 23:18

## 2018-01-26 RX ADMIN — Medication 100 UNIT(S): at 11:05

## 2018-01-26 RX ADMIN — Medication 5 MILLIGRAM(S): at 23:18

## 2018-01-26 RX ADMIN — GABAPENTIN 100 MILLIGRAM(S): 400 CAPSULE ORAL at 05:43

## 2018-01-26 RX ADMIN — OCTREOTIDE ACETATE 450 MICROGRAM(S): 200 INJECTION, SOLUTION INTRAVENOUS; SUBCUTANEOUS at 11:05

## 2018-01-26 RX ADMIN — Medication 50 MILLIGRAM(S): at 05:43

## 2018-01-26 RX ADMIN — Medication 10 MILLIGRAM(S): at 17:26

## 2018-01-26 RX ADMIN — ONDANSETRON 4 MILLIGRAM(S): 8 TABLET, FILM COATED ORAL at 23:18

## 2018-01-26 RX ADMIN — Medication 400 MILLIGRAM(S): at 09:21

## 2018-01-26 RX ADMIN — GABAPENTIN 100 MILLIGRAM(S): 400 CAPSULE ORAL at 17:25

## 2018-01-26 NOTE — CHART NOTE - NSCHARTNOTEFT_GEN_A_CORE
Admitting Diagnosis:   Patient is a 57y old  Female who presents with a chief complaint of enterocutaneous fistula (24 Jul 2017 14:15)      PAST MEDICAL & SURGICAL HISTORY:  Reflux  Obesity  DVT (deep venous thrombosis): 2013 neck  Diverticulitis  Hypertension  Elective surgery: abodominal wall surgery  dec 2016  Elective surgery: NOV 2016 gastric bypass revision  Gastric bypass status for obesity: gastric sleeve, 6/2012  S/P breast biopsy: 2011, left  S/P colon resection: 2011  S/P knee surgery: repair 2009, 2011  right; left  Umbilical hernia: 2000  S/P appendectomy: 1975  S/P tonsillectomy: 1967    Current Nutrition Order:  NPO except ice chips and water; allowed 2 hard candies per day at this time, and 1 ice M/W/F  TPN via central venous line:  1752 TV (73ml/hr); 355g dex, 104g amino acids, 50g 20% lipids (2123 kcal, 140g protein, 2g/kg IBW pro, GIR 3.14 based on most recent standing wt)    PO Intake: Good (%) [   ]  Fair (50-75%) [   ] Poor (<25%) [   ] -N/A except hard candies and ices    GI Issues: No N/V endorsed; ostomy w/good function per MD note    Pain: No pain endorsed    Skin Integrity: Sacrum w/healing unstageable pressure ulcer, wound vac recently changes to abdominal wound until surgery- changed on 1/25    Labs:   01-26    135  |  98  |  26<H>  ----------------------------<  134<H>  4.2   |  28  |  0.63    Ca    9.8      26 Jan 2018 08:16  Phos  3.4     01-26  Mg     2.0     01-26    TPro  7.2  /  Alb  3.1<L>  /  TBili  0.5  /  DBili  x   /  AST  24  /  ALT  39  /  AlkPhos  227<H>  01-26    CAPILLARY BLOOD GLUCOSE          Medications:  MEDICATIONS  (STANDING):  bismuth subsalicylate Liquid 30 milliLiter(s) Oral daily  calcitriol Injectable 1 MICROGram(s) IV Push <User Schedule>  collagenase Ointment 1 Application(s) Topical daily  cyanocobalamin Injectable 1000 MICROGram(s) SubCutaneous every 7 days  dextrose 5%. 1000 milliLiter(s) (50 mL/Hr) IV Continuous <Continuous>  diazepam    Tablet 5 milliGRAM(s) Oral at bedtime  diphenhydrAMINE   Injectable 50 milliGRAM(s) IV Push every 6 hours  enoxaparin Injectable 40 milliGRAM(s) SubCutaneous two times a day  escitalopram 20 milliGRAM(s) Oral daily  gabapentin 100 milliGRAM(s) Oral two times a day  heparin  flush 100 Units/mL Injectable 100 Unit(s) IV Push every other day  lidocaine   Patch 1 Patch Transdermal daily  losartan 50 milliGRAM(s) Oral daily  nystatin Powder 1 Application(s) Topical two times a day  octreotide  Injectable 450 MICROGram(s) IV Push daily  oxybutynin 10 milliGRAM(s) Oral two times a day  pantoprazole  Injectable 40 milliGRAM(s) IV Push daily  Parenteral Nutrition - Adult 1 Each (73 mL/Hr) TPN Continuous <Continuous>  sodium chloride 0.9% lock flush 20 milliLiter(s) IV Push once    MEDICATIONS  (PRN):  ondansetron Injectable 4 milliGRAM(s) IV Push every 6 hours PRN Nausea and/or Vomiting  sodium chloride 0.9% lock flush 10 milliLiter(s) IV Push every 1 hour PRN After each medication administration  sodium chloride 0.9% lock flush 10 milliLiter(s) IV Push every 12 hours PRN Lumen of catheter NOT used        Weight:  174.8# (1/15)  175.1 (1/8/18)  172.5 (12/19)  171.5lb (12/14)  167lb (11/13)  164lb (10/17)  219lb (8/1)    Weight Change:   03/2016: 89kg  02/2017: 74kg   07/2017: 76kg  11/2017: 76kg   1/2018: 79.5 kg    Estimated energy needs:   Estimated energy needs using 52 kg IBW; Increased needs per wound and pressure ulcer healing. Needs calculated based on TPN as primary route of nutrition.  30-35 kcal/kg (2365-6954 kcal).   1.8-2 g/kg ( g protein).  30-35 mL/kg (7389-8876 mL fluid).     Subjective:   S/p repeat EGD showing healed mucosal defect at EJ junction w/o further fistual tracts per MD note. Surgical plan for revision of EC fistula on 1/30- awaiting recs/clearance by medicine team.  NPO except for ice chips and water, and allowed 2 hard candies per day. 1/26 labs : pre-albumin WNL,  (high, though still appropriate for TG to continue to run, as <500mg/dL). AlkPhos slightly elevated. BG trending 95-145mg/dL. Consider decreasing Dex to 270g if wt continues to trend up. Fluids, micronutrients and lytes per MD discretion. RD to follow per policy.     Previous Nutrition Diagnosis: Increased nutrient needs RT increased demand for nutrients AEB wound and pressure ulcer healing   Active [  X]  Resolved [   ]    If resolved, new PES: N/A    Goal: Pt to meet >75% EER via tolerated route    Recommendations:  1) Continue w/ current TPN order as tolerated; begin to assess for other routes for nutrition as wound healing progresses   2) Monitor pre-alb, BG/FS, triglycerides weekly   3) Adjust lipids per MD discretion -Please obtain new lab values weekly  4) Please continue to take standing weight for trending purposes  5) Consider OsCal tablet crushed w/ water/italian ice     Education: N/A- pt educated previously     Risk Level: High [  X ] Moderate [   ] Low [   ]. Admitting Diagnosis:   Patient is a 57y old  Female who presents with a chief complaint of enterocutaneous fistula (24 Jul 2017 14:15)      PAST MEDICAL & SURGICAL HISTORY:  Reflux  Obesity  DVT (deep venous thrombosis): 2013 neck  Diverticulitis  Hypertension  Elective surgery: abodominal wall surgery  dec 2016  Elective surgery: NOV 2016 gastric bypass revision  Gastric bypass status for obesity: gastric sleeve, 6/2012  S/P breast biopsy: 2011, left  S/P colon resection: 2011  S/P knee surgery: repair 2009, 2011  right; left  Umbilical hernia: 2000  S/P appendectomy: 1975  S/P tonsillectomy: 1967    Current Nutrition Order:  NPO except ice chips and water; allowed 2 hard candies per day at this time, and 1 ice M/W/F  TPN via central venous line:  1752 TV (73ml/hr); 355g dex, 104g amino acids, 50g 20% lipids (2123 kcal, 140g protein, 2g/kg IBW pro, GIR 3.14 based on most recent standing wt)    PO Intake: Good (%) [   ]  Fair (50-75%) [   ] Poor (<25%) [   ] -N/A except hard candies and ices    GI Issues: No N/V endorsed; ostomy w/good function per MD note    Pain: No pain endorsed    Skin Integrity: Sacrum w/healing unstageable pressure ulcer, wound vac recently changes to abdominal wound until surgery- changed on 1/25    Labs:   01-26    135  |  98  |  26<H>  ----------------------------<  134<H>  4.2   |  28  |  0.63    Ca    9.8      26 Jan 2018 08:16  Phos  3.4     01-26  Mg     2.0     01-26    TPro  7.2  /  Alb  3.1<L>  /  TBili  0.5  /  DBili  x   /  AST  24  /  ALT  39  /  AlkPhos  227<H>  01-26    CAPILLARY BLOOD GLUCOSE          Medications:  MEDICATIONS  (STANDING):  bismuth subsalicylate Liquid 30 milliLiter(s) Oral daily  calcitriol Injectable 1 MICROGram(s) IV Push <User Schedule>  collagenase Ointment 1 Application(s) Topical daily  cyanocobalamin Injectable 1000 MICROGram(s) SubCutaneous every 7 days  dextrose 5%. 1000 milliLiter(s) (50 mL/Hr) IV Continuous <Continuous>  diazepam    Tablet 5 milliGRAM(s) Oral at bedtime  diphenhydrAMINE   Injectable 50 milliGRAM(s) IV Push every 6 hours  enoxaparin Injectable 40 milliGRAM(s) SubCutaneous two times a day  escitalopram 20 milliGRAM(s) Oral daily  gabapentin 100 milliGRAM(s) Oral two times a day  heparin  flush 100 Units/mL Injectable 100 Unit(s) IV Push every other day  lidocaine   Patch 1 Patch Transdermal daily  losartan 50 milliGRAM(s) Oral daily  nystatin Powder 1 Application(s) Topical two times a day  octreotide  Injectable 450 MICROGram(s) IV Push daily  oxybutynin 10 milliGRAM(s) Oral two times a day  pantoprazole  Injectable 40 milliGRAM(s) IV Push daily  Parenteral Nutrition - Adult 1 Each (73 mL/Hr) TPN Continuous <Continuous>  sodium chloride 0.9% lock flush 20 milliLiter(s) IV Push once    MEDICATIONS  (PRN):  ondansetron Injectable 4 milliGRAM(s) IV Push every 6 hours PRN Nausea and/or Vomiting  sodium chloride 0.9% lock flush 10 milliLiter(s) IV Push every 1 hour PRN After each medication administration  sodium chloride 0.9% lock flush 10 milliLiter(s) IV Push every 12 hours PRN Lumen of catheter NOT used        Weight:  174.8# (1/15)  175.1 (1/8/18)  172.5 (12/19)  171.5lb (12/14)  167lb (11/13)  164lb (10/17)  219lb (8/1)    Weight Change:   03/2016: 89kg  02/2017: 74kg   07/2017: 76kg  11/2017: 76kg   1/2018: 79.5 kg    Estimated energy needs:   Estimated energy needs using 52 kg IBW; Increased needs per wound and pressure ulcer healing. Needs calculated based on TPN as primary route of nutrition.  30-35 kcal/kg (6090-8417 kcal).   1.8-2 g/kg ( g protein).  30-35 mL/kg (4615-3556 mL fluid).     Subjective:   S/p repeat EGD showing healed mucosal defect at EJ junction w/o further fistual tracts per MD note. Surgical plan for revision of EC fistula on 1/30- awaiting recs/clearance by medicine team.  Pt continues to decline PT d/t "leaking"- per PT note, possibly will be d/c'ed from PT program. NPO except for ice chips and water, and allowed 2 hard candies per day. 1/26 labs : pre-albumin WNL,  (high, though still appropriate for TG to continue to run, as <500mg/dL). AlkPhos slightly elevated. BG trending 95-145mg/dL. Consider decreasing Dex to 270g if wt continues to trend up. Fluids, micronutrients and lytes per MD discretion. RD to follow per policy.     Previous Nutrition Diagnosis: Increased nutrient needs RT increased demand for nutrients AEB wound and pressure ulcer healing   Active [  X]  Resolved [   ]    If resolved, new PES: N/A    Goal: Pt to meet >75% EER via tolerated route    Recommendations:  1) Continue w/ current TPN order as tolerated; begin to assess for other routes for nutrition as wound healing progresses   2) Monitor pre-alb, BG/FS, triglycerides weekly   3) Adjust lipids per MD discretion -Please obtain new lab values weekly  4) Please continue to take standing weight for trending purposes  5) Consider OsCal tablet crushed w/ water/italian ice     Education: N/A- pt educated previously     Risk Level: High [  X ] Moderate [   ] Low [   ].

## 2018-01-26 NOTE — CONSULT NOTE ADULT - ATTENDING COMMENTS
Seen and examined by me this afternoon. Chart reviewed. Prolonged hospital stay. Pre-operative assessment: RCRI of 0, METS <4.  Intermediate risk procedure. Combined clinical and surgical risk is low. Pt is medically optimized for planned procedure pending EKG.  C/w losartan for HTN.  Rest as above. Will continue to follow up. Seen and examined by me this afternoon. Chart reviewed. Prolonged hospital stay. Pre-operative assessment: RCRI of 0, METS <4.  Intermediate risk procedure. Combined clinical and surgical risk is low. Pt is medically optimized for planned procedure pending EKG.  C/w losartan for HTN. BMI >30 --> Obesity.  Rest as above. Will continue to follow up.

## 2018-01-26 NOTE — CONSULT NOTE ADULT - SUBJECTIVE AND OBJECTIVE BOX
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 Seen by GI, with EGD revealing EJ junction with healed mucosa. Consulted for pre-operative clearance.    OVERNIGHT EVENTS/ROS:    Past Medical History:  Diverticulitis    DVT (deep venous thrombosis)  2013 neck  Hypertension    Obesity    Reflux.    Past Surgical History:  Elective surgery  NOV 2016 gastric bypass revision  Elective surgery  abdominal wall surgery dec 2016  Gastric bypass status for obesity  gastric sleeve, 6/2012  S/P appendectomy  1975  S/P breast biopsy  2011, left  S/P colon resection  2011  S/P knee surgery  repair 2009, 2011  right; left  S/P tonsillectomy  1967  Umbilical hernia  2000.    Social History:  never smoker    VITALS  T(C): 37 (01-26-18 @ 09:32), Max: 37.3 (01-25-18 @ 21:28)  HR: 77 (01-26-18 @ 09:32) (71 - 77)  BP: 115/78 (01-26-18 @ 09:32) (115/78 - 136/85)  RR: 15 (01-26-18 @ 09:32) (15 - 17)  SpO2: 97% (01-26-18 @ 09:32) (95% - 97%)  Wt(kg): --Vital Signs Last 24 Hrs  T(C): 37 (26 Jan 2018 09:32), Max: 37.3 (25 Jan 2018 21:28)  T(F): 98.6 (26 Jan 2018 09:32), Max: 99.1 (25 Jan 2018 21:28)  HR: 77 (26 Jan 2018 09:32) (71 - 77)  BP: 115/78 (26 Jan 2018 09:32) (115/78 - 136/85)  BP(mean): --  RR: 15 (26 Jan 2018 09:32) (15 - 17)  SpO2: 97% (26 Jan 2018 09:32) (95% - 97%)    PHYSICAL EXAM:      LABS:    01-26    135  |  98  |  26<H>  ----------------------------<  134<H>  4.2   |  28  |  0.63    Ca    9.8      26 Jan 2018 08:16  Phos  3.4     01-26  Mg     2.0     01-26    TPro  7.2  /  Alb  3.1<L>  /  TBili  0.5  /  DBili  x   /  AST  24  /  ALT  39  /  AlkPhos  227<H>  01-26    RADIOLOGY: CONSULT NOTE MEDICINE CONSULT:    HPI: Patient is a 58 yo F with PMH of HTN, gastric bypass in 2002 complicated by corkscrewed sleeve and chronic leak (revised 11/28/2016), history of C difficile infection and MDR infections PNA, s/p tracheostomy), enterocutaneous fistula, esophagojejunostomy revision requiring distal revision for distal anastomosis breakdown, course here complained by ATN, acute respiratory failure and septic shock, MRSA bacteremia, continued course of hospitalization for 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 Seen by GI, with EGD revealing EJ junction with healed mucosa. Consulted for pre-operative clearance.    OVERNIGHT EVENTS/ROS:    Past Medical History:  Diverticulitis    DVT (deep venous thrombosis)  2013 neck  Hypertension    Obesity    Reflux.    Past Surgical History:  Elective surgery  NOV 2016 gastric bypass revision  Elective surgery  abdominal wall surgery dec 2016  Gastric bypass status for obesity  gastric sleeve, 6/2012  S/P appendectomy  1975  S/P breast biopsy  2011, left  S/P colon resection  2011  S/P knee surgery  repair 2009, 2011  right; left  S/P tonsillectomy  1967  Umbilical hernia  2000.    Social History:  never smoker    VITALS  T(C): 37 (01-26-18 @ 09:32), Max: 37.3 (01-25-18 @ 21:28)  HR: 77 (01-26-18 @ 09:32) (71 - 77)  BP: 115/78 (01-26-18 @ 09:32) (115/78 - 136/85)  RR: 15 (01-26-18 @ 09:32) (15 - 17)  SpO2: 97% (01-26-18 @ 09:32) (95% - 97%)  Wt(kg): --Vital Signs Last 24 Hrs  T(C): 37 (26 Jan 2018 09:32), Max: 37.3 (25 Jan 2018 21:28)  T(F): 98.6 (26 Jan 2018 09:32), Max: 99.1 (25 Jan 2018 21:28)  HR: 77 (26 Jan 2018 09:32) (71 - 77)  BP: 115/78 (26 Jan 2018 09:32) (115/78 - 136/85)  BP(mean): --  RR: 15 (26 Jan 2018 09:32) (15 - 17)  SpO2: 97% (26 Jan 2018 09:32) (95% - 97%)    PHYSICAL EXAM:      LABS:    01-26    135  |  98  |  26<H>  ----------------------------<  134<H>  4.2   |  28  |  0.63    Ca    9.8      26 Jan 2018 08:16  Phos  3.4     01-26  Mg     2.0     01-26    TPro  7.2  /  Alb  3.1<L>  /  TBili  0.5  /  DBili  x   /  AST  24  /  ALT  39  /  AlkPhos  227<H>  01-26    RADIOLOGY:  ELODIA Echo Doppler w/Cont (11.28.17 @ 15:26)  Interpretation Summary  ELODIA performed to evaluate for endocarditis. The left ventricular wall   motion is normal. The left ventricular ejection fraction is normal. The right   ventricle is normal in size and function. The left atrial size is normal. No clot seen   in the left atrium or in the left atrial appendage. No evidence for interatrial   shunt by color Doppler assessment. Right atrial size is normal. There is mild to   moderate mitral valve thickening. There is mild mitral regurgitation. There is   mild aortic valve thickening. The aortic valve is trileaflet. There is   mild to moderate aortic regurgitation. Structurally normal tricuspid valve. No   tricuspid regurgitation noted. Structurally normal pulmonic valve. No pulmonic   regurgitation noted. Minimal, non-mobile plaque is noted in the visualized   portions of the descending aorta and aortic arch.  There is no   pericardial effusion. Left pleural effusion noted. No vegetations seen.    Xray Chest 1 View AP/PA (01.03.18 @ 13:10)   IMPRESSION:      No pneumothorax, status post left subclavian access central venous   catheter placement.  Persistent left basilar density.    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 Seen by GI, with EGD revealing EJ junction with healed mucosa. Consulted for pre-operative clearance. 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 Seen by GI, with EGD revealing EJ junction with healed mucosa. Consulted for pre-operative clearance.    OVERNIGHT EVENTS/ROS:    Past Medical History:  Diverticulitis    DVT (deep venous thrombosis)  2013 neck  Hypertension    Obesity    Reflux.    Past Surgical History:  Elective surgery  NOV 2016 gastric bypass revision  Elective surgery  abdominal wall surgery dec 2016  Gastric bypass status for obesity  gastric sleeve, 6/2012  S/P appendectomy  1975  S/P breast biopsy  2011, left  S/P colon resection  2011  S/P knee surgery  repair 2009, 2011  right; left  S/P tonsillectomy  1967  Umbilical hernia  2000.    Social History:  never smoker    VITALS  T(C): 37 (01-26-18 @ 09:32), Max: 37.3 (01-25-18 @ 21:28)  HR: 77 (01-26-18 @ 09:32) (71 - 77)  BP: 115/78 (01-26-18 @ 09:32) (115/78 - 136/85)  RR: 15 (01-26-18 @ 09:32) (15 - 17)  SpO2: 97% (01-26-18 @ 09:32) (95% - 97%)  Wt(kg): --Vital Signs Last 24 Hrs  T(C): 37 (26 Jan 2018 09:32), Max: 37.3 (25 Jan 2018 21:28)  T(F): 98.6 (26 Jan 2018 09:32), Max: 99.1 (25 Jan 2018 21:28)  HR: 77 (26 Jan 2018 09:32) (71 - 77)  BP: 115/78 (26 Jan 2018 09:32) (115/78 - 136/85)  BP(mean): --  RR: 15 (26 Jan 2018 09:32) (15 - 17)  SpO2: 97% (26 Jan 2018 09:32) (95% - 97%)    PHYSICAL EXAM:  Gen: well developed, well nourished, resting comfortably in bed  HEENT: moist mucus membranes, EOMI, PERRLA, sclera nonicteric  CV: regular rate and rhythm, +S1/S2, no murmurs  Resp: clear to auscultation bilaterally, no rales/rhonchi/wheezing  Abd: soft, nontender to palpation, +left ostomy in place with visualized three entero-cutaneous fistulas in place. Output is green/brown in color  Extr: warm and well perfused, non-edematous, distal pulses palpable  Neuro: alert and oriented to person, place and time; good fund of knowledge with appropriate affect; cranial nerves - EOMI, PERRLA, face symmetric, tongue midline; motor with good tone and bulk, sensory intact throughout    LABS:    01-26    135  |  98  |  26<H>  ----------------------------<  134<H>  4.2   |  28  |  0.63    Ca    9.8      26 Jan 2018 08:16  Phos  3.4     01-26  Mg     2.0     01-26    TPro  7.2  /  Alb  3.1<L>  /  TBili  0.5  /  DBili  x   /  AST  24  /  ALT  39  /  AlkPhos  227<H>  01-26    RADIOLOGY:  ELODIA Echo Doppler w/Cont (11.28.17 @ 15:26)  Interpretation Summary  ELODIA performed to evaluate for endocarditis. The left ventricular wall   motion is normal. The left ventricular ejection fraction is normal. The right   ventricle is normal in size and function. The left atrial size is normal. No clot seen   in the left atrium or in the left atrial appendage. No evidence for interatrial   shunt by color Doppler assessment. Right atrial size is normal. There is mild to   moderate mitral valve thickening. There is mild mitral regurgitation. There is   mild aortic valve thickening. The aortic valve is trileaflet. There is   mild to moderate aortic regurgitation. Structurally normal tricuspid valve. No   tricuspid regurgitation noted. Structurally normal pulmonic valve. No pulmonic   regurgitation noted. Minimal, non-mobile plaque is noted in the visualized   portions of the descending aorta and aortic arch.  There is no   pericardial effusion. Left pleural effusion noted. No vegetations seen.    Xray Chest 1 View AP/PA (01.03.18 @ 13:10)   IMPRESSION:      No pneumothorax, status post left subclavian access central venous   catheter placement.  Persistent left basilar density.    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 Seen by GI, with EGD revealing EJ junction with healed mucosa. Consulted for pre-operative clearance.  1. Entero-cutaneous fistula repair - planned surgical repair is 1/30; this is a low risk patient (no CKD, CHF, MI, Diabetes) being assessed with an intermediate risk procedure. METS <4  -Ordered for EKG for patient (last EKG 7/2017)  -Patient is medically optimized for this procedure (pending results of EKG). After procedure will need incentive spirometry, pain control  -Per primary team, continue TPN with NPO (except 2 candies per day)  -Octreotide 450mcg IV daily  -PPX with Lovenox 40mg bid    2. HTN - stable and well controlled  -Continue Losartan 50mg PO daily    3. Anxiety - appears stable  -Continue Lexapro 20mg PO daily    Case discussed with Dr Euceda, will continue to follow with you for another pre-operative visit and post-operatively

## 2018-01-26 NOTE — PROGRESS NOTE ADULT - SUBJECTIVE AND OBJECTIVE BOX
O/N: VSS. BRAYDEN  1/25: F/U med consult , Vac changed today , ARMANDO OWEN O/N: VSS. BRAYDEN  1/25: F/U med consult , Vac changed today , BRAYDEN , VSS     INTERVAL HPI/OVERNIGHT EVENTS:        SUBJECTIVE: Pt seen and examined at bedside. No acute complaints.   Flatus: [ ] YES [x ] NO             Bowel Movement: [ ] YES [x ] NO  Pain (0-10):            Pain Control Adequate: [x ] YES [ ] NO  Nausea: [ ] YES [x ] NO            Vomiting: [ ] YES [x ] NO  Diarrhea: [ ] YES [x ] NO         Constipation: [ ] YES [x ] NO     Chest Pain: [ ] YES [x ] NO    SOB:  [ ] YES [x ] NO    MEDICATIONS  (STANDING):  acetaminophen  IVPB. 1000 milliGRAM(s) IV Intermittent once  bismuth subsalicylate Liquid 30 milliLiter(s) Oral daily  calcitriol Injectable 1 MICROGram(s) IV Push <User Schedule>  collagenase Ointment 1 Application(s) Topical daily  cyanocobalamin Injectable 1000 MICROGram(s) SubCutaneous every 7 days  dextrose 5%. 1000 milliLiter(s) (50 mL/Hr) IV Continuous <Continuous>  diazepam    Tablet 5 milliGRAM(s) Oral at bedtime  diphenhydrAMINE   Injectable 50 milliGRAM(s) IV Push every 6 hours  enoxaparin Injectable 40 milliGRAM(s) SubCutaneous two times a day  escitalopram 20 milliGRAM(s) Oral daily  fat emulsion (Plant Based) 20% Infusion 0.502 Gm/kG/Day (20.83 mL/Hr) IV Continuous <Continuous>  gabapentin 100 milliGRAM(s) Oral two times a day  heparin  flush 100 Units/mL Injectable 100 Unit(s) IV Push every other day  lidocaine   Patch 1 Patch Transdermal daily  losartan 50 milliGRAM(s) Oral daily  nystatin Powder 1 Application(s) Topical two times a day  octreotide  Injectable 450 MICROGram(s) IV Push daily  oxybutynin 10 milliGRAM(s) Oral two times a day  pantoprazole  Injectable 40 milliGRAM(s) IV Push daily  Parenteral Nutrition - Adult 1 Each (73 mL/Hr) TPN Continuous <Continuous>  sodium chloride 0.9% lock flush 20 milliLiter(s) IV Push once    MEDICATIONS  (PRN):  ondansetron Injectable 4 milliGRAM(s) IV Push every 6 hours PRN Nausea and/or Vomiting  sodium chloride 0.9% lock flush 10 milliLiter(s) IV Push every 1 hour PRN After each medication administration  sodium chloride 0.9% lock flush 10 milliLiter(s) IV Push every 12 hours PRN Lumen of catheter NOT used      Vital Signs Last 24 Hrs  T(C): 36.8 (26 Jan 2018 05:27), Max: 37.3 (25 Jan 2018 21:28)  T(F): 98.3 (26 Jan 2018 05:27), Max: 99.1 (25 Jan 2018 21:28)  HR: 73 (26 Jan 2018 05:27) (62 - 75)  BP: 126/82 (26 Jan 2018 05:27) (121/82 - 142/86)  BP(mean): --  RR: 17 (26 Jan 2018 05:27) (16 - 17)  SpO2: 96% (26 Jan 2018 05:27) (95% - 96%)    PHYSICAL EXAM:      Constitutional: A&Ox3    Respiratory: non labored breathing, no respiratory distress    Cardiovascular: RRR    Gastrointestinal: Soft nt nd fistula manager intact.                  Incision:    Genitourinary: voiding    Extremities: (-) edema                  I&O's Detail    25 Jan 2018 07:01  -  26 Jan 2018 07:00  --------------------------------------------------------  IN:    fat emulsion (Plant Based) 20% Infusion: 208 mL    TPN (Total Parenteral Nutrition): 1752 mL  Total IN: 1960 mL    OUT:    Drain: 80 mL  Total OUT: 80 mL    Total NET: 1880 mL          LABS:    01-25    138  |  100  |  28<H>  ----------------------------<  122<H>  4.2   |  29  |  0.60    Ca    9.5      25 Jan 2018 06:55            RADIOLOGY & ADDITIONAL STUDIES:

## 2018-01-27 LAB
ANION GAP SERPL CALC-SCNC: 7 MMOL/L — SIGNIFICANT CHANGE UP (ref 5–17)
BUN SERPL-MCNC: 31 MG/DL — HIGH (ref 7–23)
CALCIUM SERPL-MCNC: 9.5 MG/DL — SIGNIFICANT CHANGE UP (ref 8.4–10.5)
CHLORIDE SERPL-SCNC: 101 MMOL/L — SIGNIFICANT CHANGE UP (ref 96–108)
CO2 SERPL-SCNC: 31 MMOL/L — SIGNIFICANT CHANGE UP (ref 22–31)
CREAT SERPL-MCNC: 0.63 MG/DL — SIGNIFICANT CHANGE UP (ref 0.5–1.3)
GLUCOSE SERPL-MCNC: 118 MG/DL — HIGH (ref 70–99)
MAGNESIUM SERPL-MCNC: 2.4 MG/DL — SIGNIFICANT CHANGE UP (ref 1.6–2.6)
PHOSPHATE SERPL-MCNC: 3.6 MG/DL — SIGNIFICANT CHANGE UP (ref 2.5–4.5)
POTASSIUM SERPL-MCNC: 4.2 MMOL/L — SIGNIFICANT CHANGE UP (ref 3.5–5.3)
POTASSIUM SERPL-SCNC: 4.2 MMOL/L — SIGNIFICANT CHANGE UP (ref 3.5–5.3)
SODIUM SERPL-SCNC: 139 MMOL/L — SIGNIFICANT CHANGE UP (ref 135–145)

## 2018-01-27 RX ORDER — ACETAMINOPHEN 500 MG
1000 TABLET ORAL ONCE
Qty: 0 | Refills: 0 | Status: COMPLETED | OUTPATIENT
Start: 2018-01-27 | End: 2018-01-27

## 2018-01-27 RX ORDER — I.V. FAT EMULSION 20 G/100ML
0.5 EMULSION INTRAVENOUS
Qty: 49.95 | Refills: 0 | Status: DISCONTINUED | OUTPATIENT
Start: 2018-01-27 | End: 2018-01-27

## 2018-01-27 RX ORDER — ELECTROLYTE SOLUTION,INJ
1 VIAL (ML) INTRAVENOUS
Qty: 0 | Refills: 0 | Status: DISCONTINUED | OUTPATIENT
Start: 2018-01-27 | End: 2018-01-27

## 2018-01-27 RX ADMIN — Medication 10 MILLIGRAM(S): at 17:11

## 2018-01-27 RX ADMIN — I.V. FAT EMULSION 20.81 GM/KG/DAY: 20 EMULSION INTRAVENOUS at 17:11

## 2018-01-27 RX ADMIN — NYSTATIN CREAM 1 APPLICATION(S): 100000 CREAM TOPICAL at 05:45

## 2018-01-27 RX ADMIN — Medication 50 MILLIGRAM(S): at 11:03

## 2018-01-27 RX ADMIN — ENOXAPARIN SODIUM 40 MILLIGRAM(S): 100 INJECTION SUBCUTANEOUS at 17:12

## 2018-01-27 RX ADMIN — LOSARTAN POTASSIUM 50 MILLIGRAM(S): 100 TABLET, FILM COATED ORAL at 05:45

## 2018-01-27 RX ADMIN — Medication 50 MILLIGRAM(S): at 05:44

## 2018-01-27 RX ADMIN — GABAPENTIN 100 MILLIGRAM(S): 400 CAPSULE ORAL at 05:45

## 2018-01-27 RX ADMIN — PANTOPRAZOLE SODIUM 40 MILLIGRAM(S): 20 TABLET, DELAYED RELEASE ORAL at 05:44

## 2018-01-27 RX ADMIN — GABAPENTIN 100 MILLIGRAM(S): 400 CAPSULE ORAL at 17:11

## 2018-01-27 RX ADMIN — ONDANSETRON 4 MILLIGRAM(S): 8 TABLET, FILM COATED ORAL at 22:15

## 2018-01-27 RX ADMIN — OCTREOTIDE ACETATE 450 MICROGRAM(S): 200 INJECTION, SOLUTION INTRAVENOUS; SUBCUTANEOUS at 11:05

## 2018-01-27 RX ADMIN — NYSTATIN CREAM 1 APPLICATION(S): 100000 CREAM TOPICAL at 18:08

## 2018-01-27 RX ADMIN — ENOXAPARIN SODIUM 40 MILLIGRAM(S): 100 INJECTION SUBCUTANEOUS at 05:44

## 2018-01-27 RX ADMIN — Medication 1000 MILLIGRAM(S): at 13:00

## 2018-01-27 RX ADMIN — Medication 1 EACH: at 17:12

## 2018-01-27 RX ADMIN — Medication 50 MILLIGRAM(S): at 22:06

## 2018-01-27 RX ADMIN — ESCITALOPRAM OXALATE 20 MILLIGRAM(S): 10 TABLET, FILM COATED ORAL at 22:06

## 2018-01-27 RX ADMIN — Medication 400 MILLIGRAM(S): at 12:28

## 2018-01-27 RX ADMIN — Medication 5 MILLIGRAM(S): at 22:07

## 2018-01-27 RX ADMIN — Medication 50 MILLIGRAM(S): at 17:11

## 2018-01-27 RX ADMIN — Medication 10 MILLIGRAM(S): at 05:45

## 2018-01-27 NOTE — PROGRESS NOTE ADULT - SUBJECTIVE AND OBJECTIVE BOX
O/N: BRAYDEN ROBERTS  1/26 Seen by medicine awaiting recs/ clearance. BRAYDEN O/N: VSS, BRAYDEN  1/26 Seen by medicine awaiting recs/ clearance. BRAYDEN    Vital Signs Last 24 Hrs  T(C): 37.2 (27 Jan 2018 14:00), Max: 37.2 (27 Jan 2018 14:00)  T(F): 98.9 (27 Jan 2018 14:00), Max: 98.9 (27 Jan 2018 14:00)  HR: 86 (27 Jan 2018 14:00) (65 - 86)  BP: 146/85 (27 Jan 2018 14:00) (116/85 - 158/88)  BP(mean): --  RR: 18 (27 Jan 2018 14:00) (16 - 18)  SpO2: 95% (27 Jan 2018 14:00) (94% - 98%)  I&O's Detail    26 Jan 2018 07:01  -  27 Jan 2018 07:00  --------------------------------------------------------  IN:    TPN (Total Parenteral Nutrition): 876 mL  Total IN: 876 mL    OUT:    Drain: 200 mL  Total OUT: 200 mL    Total NET: 676 mL          General: NAD, resting comfortably in bed  Pulm: Nonlabored breathing, no respiratory distress  Abd: s NTND fistula manager intact.         LABS:    01-27    139  |  101  |  31<H>  ----------------------------<  118<H>  4.2   |  31  |  0.63    Ca    9.5      27 Jan 2018 07:46  Phos  3.6     01-27  Mg     2.4     01-27    TPro  7.2  /  Alb  3.1<L>  /  TBili  0.5  /  DBili  x   /  AST  24  /  ALT  39  /  AlkPhos  227<H>  01-26          RADIOLOGY & ADDITIONAL STUDIES:

## 2018-01-28 LAB
ANION GAP SERPL CALC-SCNC: 10 MMOL/L — SIGNIFICANT CHANGE UP (ref 5–17)
BUN SERPL-MCNC: 26 MG/DL — HIGH (ref 7–23)
CALCIUM SERPL-MCNC: 9.4 MG/DL — SIGNIFICANT CHANGE UP (ref 8.4–10.5)
CHLORIDE SERPL-SCNC: 101 MMOL/L — SIGNIFICANT CHANGE UP (ref 96–108)
CO2 SERPL-SCNC: 28 MMOL/L — SIGNIFICANT CHANGE UP (ref 22–31)
CREAT SERPL-MCNC: 0.62 MG/DL — SIGNIFICANT CHANGE UP (ref 0.5–1.3)
GLUCOSE SERPL-MCNC: 134 MG/DL — HIGH (ref 70–99)
POTASSIUM SERPL-MCNC: 4.3 MMOL/L — SIGNIFICANT CHANGE UP (ref 3.5–5.3)
POTASSIUM SERPL-SCNC: 4.3 MMOL/L — SIGNIFICANT CHANGE UP (ref 3.5–5.3)
SODIUM SERPL-SCNC: 139 MMOL/L — SIGNIFICANT CHANGE UP (ref 135–145)

## 2018-01-28 RX ORDER — ACETAMINOPHEN 500 MG
1000 TABLET ORAL ONCE
Qty: 0 | Refills: 0 | Status: COMPLETED | OUTPATIENT
Start: 2018-01-28 | End: 2018-01-29

## 2018-01-28 RX ORDER — ACETAMINOPHEN 500 MG
1000 TABLET ORAL ONCE
Qty: 0 | Refills: 0 | Status: COMPLETED | OUTPATIENT
Start: 2018-01-28 | End: 2018-01-28

## 2018-01-28 RX ORDER — ELECTROLYTE SOLUTION,INJ
1 VIAL (ML) INTRAVENOUS
Qty: 0 | Refills: 0 | Status: DISCONTINUED | OUTPATIENT
Start: 2018-01-28 | End: 2018-01-29

## 2018-01-28 RX ADMIN — Medication 10 MILLIGRAM(S): at 05:45

## 2018-01-28 RX ADMIN — ENOXAPARIN SODIUM 40 MILLIGRAM(S): 100 INJECTION SUBCUTANEOUS at 16:16

## 2018-01-28 RX ADMIN — Medication 400 MILLIGRAM(S): at 03:25

## 2018-01-28 RX ADMIN — Medication 1 EACH: at 17:44

## 2018-01-28 RX ADMIN — Medication 1000 MILLIGRAM(S): at 03:50

## 2018-01-28 RX ADMIN — ONDANSETRON 4 MILLIGRAM(S): 8 TABLET, FILM COATED ORAL at 11:10

## 2018-01-28 RX ADMIN — Medication 1 APPLICATION(S): at 05:46

## 2018-01-28 RX ADMIN — GABAPENTIN 100 MILLIGRAM(S): 400 CAPSULE ORAL at 05:45

## 2018-01-28 RX ADMIN — Medication 400 MILLIGRAM(S): at 10:06

## 2018-01-28 RX ADMIN — Medication 50 MILLIGRAM(S): at 16:16

## 2018-01-28 RX ADMIN — ONDANSETRON 4 MILLIGRAM(S): 8 TABLET, FILM COATED ORAL at 17:44

## 2018-01-28 RX ADMIN — GABAPENTIN 100 MILLIGRAM(S): 400 CAPSULE ORAL at 16:16

## 2018-01-28 RX ADMIN — ENOXAPARIN SODIUM 40 MILLIGRAM(S): 100 INJECTION SUBCUTANEOUS at 05:45

## 2018-01-28 RX ADMIN — Medication 50 MILLIGRAM(S): at 21:25

## 2018-01-28 RX ADMIN — Medication 10 MILLIGRAM(S): at 16:16

## 2018-01-28 RX ADMIN — Medication 100 UNIT(S): at 11:09

## 2018-01-28 RX ADMIN — Medication 50 MILLIGRAM(S): at 05:45

## 2018-01-28 RX ADMIN — LOSARTAN POTASSIUM 50 MILLIGRAM(S): 100 TABLET, FILM COATED ORAL at 05:45

## 2018-01-28 RX ADMIN — ESCITALOPRAM OXALATE 20 MILLIGRAM(S): 10 TABLET, FILM COATED ORAL at 21:25

## 2018-01-28 RX ADMIN — Medication 50 MILLIGRAM(S): at 11:10

## 2018-01-28 RX ADMIN — PANTOPRAZOLE SODIUM 40 MILLIGRAM(S): 20 TABLET, DELAYED RELEASE ORAL at 05:45

## 2018-01-28 RX ADMIN — Medication 1000 MILLIGRAM(S): at 11:00

## 2018-01-28 RX ADMIN — NYSTATIN CREAM 1 APPLICATION(S): 100000 CREAM TOPICAL at 16:38

## 2018-01-28 RX ADMIN — OCTREOTIDE ACETATE 450 MICROGRAM(S): 200 INJECTION, SOLUTION INTRAVENOUS; SUBCUTANEOUS at 11:09

## 2018-01-28 RX ADMIN — NYSTATIN CREAM 1 APPLICATION(S): 100000 CREAM TOPICAL at 05:46

## 2018-01-28 RX ADMIN — Medication 5 MILLIGRAM(S): at 21:25

## 2018-01-28 NOTE — PROGRESS NOTE ADULT - SUBJECTIVE AND OBJECTIVE BOX
o/n: BRAYDEN, VSS  1/27: BRAYDEN, VSS o/n: BRAYDEN, VSS  1/27: BRAYDEN, VSS      Vital Signs Last 24 Hrs  T(C): 36.2 (28 Jan 2018 05:40), Max: 37.4 (27 Jan 2018 17:42)  T(F): 97.2 (28 Jan 2018 05:40), Max: 99.3 (27 Jan 2018 17:42)  HR: 69 (28 Jan 2018 05:40) (65 - 86)  BP: 140/85 (28 Jan 2018 05:40) (131/84 - 158/88)  BP(mean): --  RR: 16 (28 Jan 2018 05:40) (16 - 18)  SpO2: 96% (28 Jan 2018 05:40) (95% - 97%)    Gen: NAD   Abd: soft, nt / nd   vac in place over fistulas

## 2018-01-29 LAB
ALBUMIN SERPL ELPH-MCNC: 3 G/DL — LOW (ref 3.3–5)
ALP SERPL-CCNC: 220 U/L — HIGH (ref 40–120)
ALT FLD-CCNC: 31 U/L — SIGNIFICANT CHANGE UP (ref 10–45)
ANION GAP SERPL CALC-SCNC: 10 MMOL/L — SIGNIFICANT CHANGE UP (ref 5–17)
AST SERPL-CCNC: 21 U/L — SIGNIFICANT CHANGE UP (ref 10–40)
BILIRUB SERPL-MCNC: 0.5 MG/DL — SIGNIFICANT CHANGE UP (ref 0.2–1.2)
BUN SERPL-MCNC: 27 MG/DL — HIGH (ref 7–23)
CALCIUM SERPL-MCNC: 9.5 MG/DL — SIGNIFICANT CHANGE UP (ref 8.4–10.5)
CHLORIDE SERPL-SCNC: 101 MMOL/L — SIGNIFICANT CHANGE UP (ref 96–108)
CO2 SERPL-SCNC: 29 MMOL/L — SIGNIFICANT CHANGE UP (ref 22–31)
CREAT SERPL-MCNC: 0.61 MG/DL — SIGNIFICANT CHANGE UP (ref 0.5–1.3)
GLUCOSE BLDC GLUCOMTR-MCNC: 102 MG/DL — HIGH (ref 70–99)
GLUCOSE BLDC GLUCOMTR-MCNC: 120 MG/DL — HIGH (ref 70–99)
GLUCOSE BLDC GLUCOMTR-MCNC: 130 MG/DL — HIGH (ref 70–99)
GLUCOSE SERPL-MCNC: 118 MG/DL — HIGH (ref 70–99)
HCT VFR BLD CALC: 34.5 % — SIGNIFICANT CHANGE UP (ref 34.5–45)
HGB BLD-MCNC: 10.5 G/DL — LOW (ref 11.5–15.5)
MAGNESIUM SERPL-MCNC: 2.1 MG/DL — SIGNIFICANT CHANGE UP (ref 1.6–2.6)
MCHC RBC-ENTMCNC: 26 PG — LOW (ref 27–34)
MCHC RBC-ENTMCNC: 30.4 G/DL — LOW (ref 32–36)
MCV RBC AUTO: 85.4 FL — SIGNIFICANT CHANGE UP (ref 80–100)
PHOSPHATE SERPL-MCNC: 3.5 MG/DL — SIGNIFICANT CHANGE UP (ref 2.5–4.5)
PLATELET # BLD AUTO: 220 K/UL — SIGNIFICANT CHANGE UP (ref 150–400)
POTASSIUM SERPL-MCNC: 4.4 MMOL/L — SIGNIFICANT CHANGE UP (ref 3.5–5.3)
POTASSIUM SERPL-SCNC: 4.4 MMOL/L — SIGNIFICANT CHANGE UP (ref 3.5–5.3)
PROT SERPL-MCNC: 7.1 G/DL — SIGNIFICANT CHANGE UP (ref 6–8.3)
RBC # BLD: 4.04 M/UL — SIGNIFICANT CHANGE UP (ref 3.8–5.2)
RBC # FLD: 17 % — HIGH (ref 10.3–16.9)
SODIUM SERPL-SCNC: 140 MMOL/L — SIGNIFICANT CHANGE UP (ref 135–145)
WBC # BLD: 4.8 K/UL — SIGNIFICANT CHANGE UP (ref 3.8–10.5)
WBC # FLD AUTO: 4.8 K/UL — SIGNIFICANT CHANGE UP (ref 3.8–10.5)

## 2018-01-29 PROCEDURE — 99233 SBSQ HOSP IP/OBS HIGH 50: CPT | Mod: GC

## 2018-01-29 PROCEDURE — 93010 ELECTROCARDIOGRAM REPORT: CPT

## 2018-01-29 RX ORDER — I.V. FAT EMULSION 20 G/100ML
50 EMULSION INTRAVENOUS ONCE
Qty: 0 | Refills: 0 | Status: COMPLETED | OUTPATIENT
Start: 2018-01-29 | End: 2018-01-29

## 2018-01-29 RX ORDER — ELECTROLYTE SOLUTION,INJ
1 VIAL (ML) INTRAVENOUS
Qty: 0 | Refills: 0 | Status: DISCONTINUED | OUTPATIENT
Start: 2018-01-29 | End: 2018-01-29

## 2018-01-29 RX ORDER — SODIUM CHLORIDE 9 MG/ML
1000 INJECTION, SOLUTION INTRAVENOUS
Qty: 0 | Refills: 0 | Status: DISCONTINUED | OUTPATIENT
Start: 2018-01-29 | End: 2018-01-30

## 2018-01-29 RX ADMIN — ESCITALOPRAM OXALATE 20 MILLIGRAM(S): 10 TABLET, FILM COATED ORAL at 23:48

## 2018-01-29 RX ADMIN — GABAPENTIN 100 MILLIGRAM(S): 400 CAPSULE ORAL at 06:13

## 2018-01-29 RX ADMIN — Medication 10 MILLIGRAM(S): at 18:44

## 2018-01-29 RX ADMIN — CALCITRIOL 1 MICROGRAM(S): 0.5 CAPSULE ORAL at 12:04

## 2018-01-29 RX ADMIN — Medication 50 MILLIGRAM(S): at 23:48

## 2018-01-29 RX ADMIN — Medication 400 MILLIGRAM(S): at 18:44

## 2018-01-29 RX ADMIN — ENOXAPARIN SODIUM 40 MILLIGRAM(S): 100 INJECTION SUBCUTANEOUS at 18:44

## 2018-01-29 RX ADMIN — I.V. FAT EMULSION 20.83 GRAM(S): 20 EMULSION INTRAVENOUS at 16:45

## 2018-01-29 RX ADMIN — NYSTATIN CREAM 1 APPLICATION(S): 100000 CREAM TOPICAL at 18:44

## 2018-01-29 RX ADMIN — Medication 5 MILLIGRAM(S): at 23:48

## 2018-01-29 RX ADMIN — Medication 1000 MILLIGRAM(S): at 19:31

## 2018-01-29 RX ADMIN — Medication 10 MILLIGRAM(S): at 06:13

## 2018-01-29 RX ADMIN — Medication 50 MILLIGRAM(S): at 18:36

## 2018-01-29 RX ADMIN — ENOXAPARIN SODIUM 40 MILLIGRAM(S): 100 INJECTION SUBCUTANEOUS at 06:13

## 2018-01-29 RX ADMIN — Medication 73 EACH: at 18:45

## 2018-01-29 RX ADMIN — NYSTATIN CREAM 1 APPLICATION(S): 100000 CREAM TOPICAL at 06:13

## 2018-01-29 RX ADMIN — Medication 50 MILLIGRAM(S): at 06:13

## 2018-01-29 RX ADMIN — GABAPENTIN 100 MILLIGRAM(S): 400 CAPSULE ORAL at 18:44

## 2018-01-29 RX ADMIN — Medication 50 MILLIGRAM(S): at 12:03

## 2018-01-29 RX ADMIN — PANTOPRAZOLE SODIUM 40 MILLIGRAM(S): 20 TABLET, DELAYED RELEASE ORAL at 06:13

## 2018-01-29 RX ADMIN — LOSARTAN POTASSIUM 50 MILLIGRAM(S): 100 TABLET, FILM COATED ORAL at 06:13

## 2018-01-29 RX ADMIN — OCTREOTIDE ACETATE 450 MICROGRAM(S): 200 INJECTION, SOLUTION INTRAVENOUS; SUBCUTANEOUS at 12:04

## 2018-01-29 NOTE — PROGRESS NOTE ADULT - SUBJECTIVE AND OBJECTIVE BOX
O/N: BRAYDEN, VSS  1/28: BRAYDEN     STATUS POST: 7/24: SBR resection, fistula resection, revision of esophagogegunostomy drain through esophageal hiatus in R mediastinum  7/29: Ex-lap, SB anastamosis in BP limb breakdown with succus throughout abdomen  8/1: abd washout, drainage of abscess, biologic mesh placement, closure of abdominal wall, vac and retention suture  8/7: abd washout, mesh removal, frozen abd noted, LLQ CHECO replaced with benson drain  8/10: leak noted from previous anastamosis site on L side, mid abdomen malecot drain placed in enterotomy, JPs x 2 placed, necrotic fascia debrided, vicryl mesh sutured to fascia circumferentially, xeroform over mesh then vac dressing placed   O/N: BRAYDEN, VSS  1/28: BRAYDEN     STATUS POST: 7/24: SBR resection, fistula resection, revision of esophagogegunostomy drain through esophageal hiatus in R mediastinum  7/29: Ex-lap, SB anastamosis in BP limb breakdown with succus throughout abdomen  8/1: abd washout, drainage of abscess, biologic mesh placement, closure of abdominal wall, vac and retention suture  8/7: abd washout, mesh removal, frozen abd noted, LLQ CHECO replaced with benson drain  8/10: leak noted from previous anastamosis site on L side, mid abdomen malecot drain placed in enterotomy, JPs x 2 placed, necrotic fascia debrided, vicryl mesh sutured to fascia circumferentially, xeroform over mesh then vac dressing placed      SUBJECTIVE: Pt seen and examined at bedside. No acute complaints   Flatus: [ ] YES [x ] NO             Bowel Movement: [ ] YES [x ] NO  Pain (0-10):            Pain Control Adequate: [ ] YES [ ] NO  Nausea: [ ] YES [x ] NO            Vomiting: [ ] YES [x ] NO  Diarrhea: [ ] YES [x ] NO         Constipation: [ ] YES [x ] NO     Chest Pain: [ ] YES [x ] NO    SOB:  [ ] YES [ x] NO    MEDICATIONS  (STANDING):  acetaminophen  IVPB. 1000 milliGRAM(s) IV Intermittent once  bismuth subsalicylate Liquid 30 milliLiter(s) Oral daily  calcitriol Injectable 1 MICROGram(s) IV Push <User Schedule>  collagenase Ointment 1 Application(s) Topical daily  cyanocobalamin Injectable 1000 MICROGram(s) SubCutaneous every 7 days  dextrose 5%. 1000 milliLiter(s) (50 mL/Hr) IV Continuous <Continuous>  diazepam    Tablet 5 milliGRAM(s) Oral at bedtime  diphenhydrAMINE   Injectable 50 milliGRAM(s) IV Push every 6 hours  enoxaparin Injectable 40 milliGRAM(s) SubCutaneous two times a day  escitalopram 20 milliGRAM(s) Oral daily  gabapentin 100 milliGRAM(s) Oral two times a day  heparin  flush 100 Units/mL Injectable 100 Unit(s) IV Push every other day  lidocaine   Patch 1 Patch Transdermal daily  losartan 50 milliGRAM(s) Oral daily  nystatin Powder 1 Application(s) Topical two times a day  octreotide  Injectable 450 MICROGram(s) IV Push daily  oxybutynin 10 milliGRAM(s) Oral two times a day  pantoprazole  Injectable 40 milliGRAM(s) IV Push daily  Parenteral Nutrition - Adult 1 Each (73 mL/Hr) TPN Continuous <Continuous>  sodium chloride 0.9% lock flush 20 milliLiter(s) IV Push once    MEDICATIONS  (PRN):  ondansetron Injectable 4 milliGRAM(s) IV Push every 6 hours PRN Nausea and/or Vomiting  sodium chloride 0.9% lock flush 10 milliLiter(s) IV Push every 1 hour PRN After each medication administration  sodium chloride 0.9% lock flush 10 milliLiter(s) IV Push every 12 hours PRN Lumen of catheter NOT used      Vital Signs Last 24 Hrs  T(C): 36.5 (29 Jan 2018 06:03), Max: 37.2 (28 Jan 2018 21:15)  T(F): 97.7 (29 Jan 2018 06:03), Max: 99 (28 Jan 2018 21:15)  HR: 62 (29 Jan 2018 06:03) (62 - 81)  BP: 134/70 (29 Jan 2018 06:03) (131/85 - 138/96)  BP(mean): --  RR: 17 (29 Jan 2018 06:03) (16 - 17)  SpO2: 94% (29 Jan 2018 06:03) (94% - 97%)    PHYSICAL EXAM:      Constitutional: A&Ox3    Respiratory: non labored breathing, no respiratory distress    Cardiovascular:  RRR    Gastrointestinal: Soft NT ND                  Incision:fistula manager in place and functioning.     Genitourinary: voiding    Extremities: (-) edema                  I&O's Detail    28 Jan 2018 07:01  -  29 Jan 2018 07:00  --------------------------------------------------------  IN:    TPN (Total Parenteral Nutrition): 876 mL  Total IN: 876 mL    OUT:    Drain: 300 mL  Total OUT: 300 mL    Total NET: 576 mL          LABS:                        10.5   4.8   )-----------( 220      ( 29 Jan 2018 07:42 )             34.5     01-29    140  |  101  |  27<H>  ----------------------------<  118<H>  4.4   |  29  |  0.61    Ca    9.5      29 Jan 2018 07:42  Phos  3.5     01-29  Mg     2.1     01-29    TPro  7.1  /  Alb  3.0<L>  /  TBili  0.5  /  DBili  x   /  AST  21  /  ALT  31  /  AlkPhos  220<H>  01-29          RADIOLOGY & ADDITIONAL STUDIES:

## 2018-01-29 NOTE — PROGRESS NOTE ADULT - ASSESSMENT
57 F s/p  SBR, fistula resection, esophagojejunostomy revision w/ post-op course complicated by RTOR for distal anastomosis breakdown, ATN, acute respiratory failure, & septic shock, MRSA bacteremia which resolved. Currently for wound care management and surgical planning.    1. enterocutanous fistula  - wound care- ostomy manager to LIWS  - NPO/ TPN/2 sucking candy/day  - AM labs, weekly albumin, pre-albumin and triglyceride (every friday).      2. Hx of DVT   - Lovenox    3. Depression/ Anxiety   - escitalopram  - diazepam  - Gabapentin     4. GERD  - Protonix    5. PPX  OOB/ PT/ Is 57 F s/p  SBR, fistula resection, esophagojejunostomy revision w/ post-op course complicated by RTOR for distal anastomosis breakdown, ATN, acute respiratory failure, & septic shock, MRSA bacteremia which resolved. Currently for wound care management and surgical planning for OR tomorrow    1. enterocutanous fistula  - wound care- ostomy manager to [facility]  - NPO/ TPN  - AM labs, weekly albumin, pre-albumin and triglyceride (every friday).      2. Hx of DVT   - Lovenox    3. Depression/ Anxiety   - escitalopram  - diazepam  - Gabapentin     4. GERD  - Protonix    5. PPX  OOB/ PT/ Is

## 2018-01-29 NOTE — PROGRESS NOTE ADULT - ATTENDING COMMENTS
Seen and examined by me this morning. Doing well,  at bedside. No complaints. VSS, NAD. EKG done in afternoon reviewed me, NSR, HR 62, no acute ST/T changes, pretty much similar to prior EKG from Feb 2017. Pt is medically optimized for planned procedure, no further testings are needed.

## 2018-01-29 NOTE — PROGRESS NOTE ADULT - SUBJECTIVE AND OBJECTIVE BOX
OVERNIGHT EVENTS: No acute events    SUBJECTIVE / INTERVAL HPI: Patient seen and examined at bedside. Patient reports feeling well, excited for surgical procedure tomorrow. Denies any acute complaints, including SOB, CP, abdominal pain, nausea or vomiting. Reports she has been ambulating over the weekend, no difficulties.    VITAL SIGNS:  Vital Signs Last 24 Hrs  T(C): 36.2 (29 Jan 2018 10:51), Max: 37.2 (28 Jan 2018 21:15)  T(F): 97.1 (29 Jan 2018 10:51), Max: 99 (28 Jan 2018 21:15)  HR: 62 (29 Jan 2018 06:03) (62 - 81)  BP: 108/69 (29 Jan 2018 10:51) (108/69 - 138/96)  BP(mean): --  RR: 16 (29 Jan 2018 10:51) (16 - 17)  SpO2: 94% (29 Jan 2018 10:51) (94% - 97%)    PHYSICAL EXAM:  Gen: well developed, well nourished, resting comfortably in bed  HEENT: moist mucus membranes, EOMI, PERRLA, sclera nonicteric  CV: regular rate and rhythm, +S1/S2, no murmurs  Resp: clear to auscultation bilaterally, no rales/rhonchi/wheezing  Abd: soft, nontender to palpation, +left ostomy in place with visualized three entero-cutaneous fistulas in place. Output is green/brown in color  Extr: warm and well perfused, non-edematous, distal pulses palpable  Neuro: alert and oriented to person, place and time; good fund of knowledge with appropriate affect; cranial nerves - EOMI, PERRLA, face symmetric, tongue midline; motor with good tone and bulk, sensory intact throughout    MEDICATIONS:  MEDICATIONS  (STANDING):  acetaminophen  IVPB. 1000 milliGRAM(s) IV Intermittent once  bismuth subsalicylate Liquid 30 milliLiter(s) Oral daily  calcitriol Injectable 1 MICROGram(s) IV Push <User Schedule>  collagenase Ointment 1 Application(s) Topical daily  cyanocobalamin Injectable 1000 MICROGram(s) SubCutaneous every 7 days  dextrose 5%. 1000 milliLiter(s) (50 mL/Hr) IV Continuous <Continuous>  diazepam    Tablet 5 milliGRAM(s) Oral at bedtime  diphenhydrAMINE   Injectable 50 milliGRAM(s) IV Push every 6 hours  enoxaparin Injectable 40 milliGRAM(s) SubCutaneous two times a day  escitalopram 20 milliGRAM(s) Oral daily  fat emulsion (Plant Based) 20% IVPB 50 milliLiter(s) IV Intermittent once  gabapentin 100 milliGRAM(s) Oral two times a day  heparin  flush 100 Units/mL Injectable 100 Unit(s) IV Push every other day  lidocaine   Patch 1 Patch Transdermal daily  losartan 50 milliGRAM(s) Oral daily  nystatin Powder 1 Application(s) Topical two times a day  octreotide  Injectable 450 MICROGram(s) IV Push daily  oxybutynin 10 milliGRAM(s) Oral two times a day  pantoprazole  Injectable 40 milliGRAM(s) IV Push daily  Parenteral Nutrition - Adult 1 Each (73 mL/Hr) TPN Continuous <Continuous>  Parenteral Nutrition - Adult 1 Each (73 mL/Hr) TPN Continuous <Continuous>  sodium chloride 0.9% lock flush 20 milliLiter(s) IV Push once    MEDICATIONS  (PRN):  ondansetron Injectable 4 milliGRAM(s) IV Push every 6 hours PRN Nausea and/or Vomiting  sodium chloride 0.9% lock flush 10 milliLiter(s) IV Push every 1 hour PRN After each medication administration  sodium chloride 0.9% lock flush 10 milliLiter(s) IV Push every 12 hours PRN Lumen of catheter NOT used      ALLERGIES:  Allergies    sulfa drugs (Unknown)  sulfamethoxazole (Other)    Intolerances        LABS:                        10.5   4.8   )-----------( 220      ( 29 Jan 2018 07:42 )             34.5     01-29    140  |  101  |  27<H>  ----------------------------<  118<H>  4.4   |  29  |  0.61    Ca    9.5      29 Jan 2018 07:42  Phos  3.5     01-29  Mg     2.1     01-29    TPro  7.1  /  Alb  3.0<L>  /  TBili  0.5  /  DBili  x   /  AST  21  /  ALT  31  /  AlkPhos  220<H>  01-29        CAPILLARY BLOOD GLUCOSE  POCT Blood Glucose.: 120 mg/dL (29 Jan 2018 00:18)    ASSESSMENT AND PLAN: Patient is a 58 yo F with PMH of HTN, gastric bypass in 2002 complicated by corkscrewed sleeve and chronic leak (revised 11/28/2016), history of C difficile infection and MDR infections PNA, s/p tracheostomy), enterocutaneous fistula, esophagojejunostomy revision requiring distal revision for distal anastomosis breakdown, course here complained by ATN, acute respiratory failure and septic shock, MRSA bacteremia, continued course of hospitalization for 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 Seen by GI, with EGD revealing EJ junction with healed mucosa. Consulted for pre-operative clearance.  1. Entero-cutaneous fistula repair - planned surgical repair is 1/30; this is a low risk patient (no CKD, CHF, MI, Diabetes) being assessed with an intermediate risk procedure. METS <4  -Ordered for EKG for patient (last EKG 7/2017)  -Patient is medically optimized for this procedure (pending results of EKG). After procedure will need incentive spirometry, pain control  -Per primary team, continue TPN with NPO (except 2 candies per day)  -Octreotide 450mcg IV daily  -PPX with Lovenox 40mg bid    2. HTN - stable and well controlled  -Continue Losartan 50mg PO daily    3. Anxiety - appears stable  -Continue Lexapro 20mg PO daily    Case discussed with Dr Euceda, will continue to follow with you for another pre-operative visit and post-operatively OVERNIGHT EVENTS: No acute events    SUBJECTIVE / INTERVAL HPI: Patient seen and examined at bedside. Patient reports feeling well, excited for surgical procedure tomorrow. Denies any acute complaints, including SOB, CP, abdominal pain, nausea or vomiting. Reports she has been ambulating over the weekend, no difficulties.    VITAL SIGNS:  Vital Signs Last 24 Hrs  T(C): 36.2 (29 Jan 2018 10:51), Max: 37.2 (28 Jan 2018 21:15)  T(F): 97.1 (29 Jan 2018 10:51), Max: 99 (28 Jan 2018 21:15)  HR: 62 (29 Jan 2018 06:03) (62 - 81)  BP: 108/69 (29 Jan 2018 10:51) (108/69 - 138/96)  BP(mean): --  RR: 16 (29 Jan 2018 10:51) (16 - 17)  SpO2: 94% (29 Jan 2018 10:51) (94% - 97%)    PHYSICAL EXAM:  Gen: well developed, well nourished, resting comfortably in bed  HEENT: moist mucus membranes, EOMI, PERRLA, sclera nonicteric  CV: regular rate and rhythm, +S1/S2, no murmurs  Resp: clear to auscultation bilaterally, no rales/rhonchi/wheezing  Abd: soft, nontender to palpation, +left ostomy in place with visualized three entero-cutaneous fistulas in place. Output is green/brown in color  Extr: warm and well perfused, non-edematous, distal pulses palpable  Neuro: alert and oriented to person, place and time; good fund of knowledge with appropriate affect; cranial nerves - EOMI, PERRLA, face symmetric, tongue midline; motor with good tone and bulk, sensory intact throughout    MEDICATIONS:  MEDICATIONS  (STANDING):  acetaminophen  IVPB. 1000 milliGRAM(s) IV Intermittent once  bismuth subsalicylate Liquid 30 milliLiter(s) Oral daily  calcitriol Injectable 1 MICROGram(s) IV Push <User Schedule>  collagenase Ointment 1 Application(s) Topical daily  cyanocobalamin Injectable 1000 MICROGram(s) SubCutaneous every 7 days  dextrose 5%. 1000 milliLiter(s) (50 mL/Hr) IV Continuous <Continuous>  diazepam    Tablet 5 milliGRAM(s) Oral at bedtime  diphenhydrAMINE   Injectable 50 milliGRAM(s) IV Push every 6 hours  enoxaparin Injectable 40 milliGRAM(s) SubCutaneous two times a day  escitalopram 20 milliGRAM(s) Oral daily  fat emulsion (Plant Based) 20% IVPB 50 milliLiter(s) IV Intermittent once  gabapentin 100 milliGRAM(s) Oral two times a day  heparin  flush 100 Units/mL Injectable 100 Unit(s) IV Push every other day  lidocaine   Patch 1 Patch Transdermal daily  losartan 50 milliGRAM(s) Oral daily  nystatin Powder 1 Application(s) Topical two times a day  octreotide  Injectable 450 MICROGram(s) IV Push daily  oxybutynin 10 milliGRAM(s) Oral two times a day  pantoprazole  Injectable 40 milliGRAM(s) IV Push daily  Parenteral Nutrition - Adult 1 Each (73 mL/Hr) TPN Continuous <Continuous>  Parenteral Nutrition - Adult 1 Each (73 mL/Hr) TPN Continuous <Continuous>  sodium chloride 0.9% lock flush 20 milliLiter(s) IV Push once    MEDICATIONS  (PRN):  ondansetron Injectable 4 milliGRAM(s) IV Push every 6 hours PRN Nausea and/or Vomiting  sodium chloride 0.9% lock flush 10 milliLiter(s) IV Push every 1 hour PRN After each medication administration  sodium chloride 0.9% lock flush 10 milliLiter(s) IV Push every 12 hours PRN Lumen of catheter NOT used      ALLERGIES:  Allergies    sulfa drugs (Unknown)  sulfamethoxazole (Other)    Intolerances        LABS:                        10.5   4.8   )-----------( 220      ( 29 Jan 2018 07:42 )             34.5     01-29    140  |  101  |  27<H>  ----------------------------<  118<H>  4.4   |  29  |  0.61    Ca    9.5      29 Jan 2018 07:42  Phos  3.5     01-29  Mg     2.1     01-29    TPro  7.1  /  Alb  3.0<L>  /  TBili  0.5  /  DBili  x   /  AST  21  /  ALT  31  /  AlkPhos  220<H>  01-29        CAPILLARY BLOOD GLUCOSE  POCT Blood Glucose.: 120 mg/dL (29 Jan 2018 00:18)    ASSESSMENT AND PLAN: Patient is a 56 yo F with PMH of HTN, gastric bypass in 2002 complicated by corkscrewed sleeve and chronic leak (revised 11/28/2016), history of C difficile infection and MDR infections PNA, s/p tracheostomy), enterocutaneous fistula, esophagojejunostomy revision requiring distal revision for distal anastomosis breakdown, course here complained by ATN, acute respiratory failure and septic shock, MRSA bacteremia, continued course of hospitalization for 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 Seen by GI, with EGD revealing EJ junction with healed mucosa. Consulted for pre-operative clearance.  1. Entero-cutaneous fistula repair - planned surgical repair is 1/30; this is a low risk patient (no CKD, CHF, MI, Diabetes) being assessed with an intermediate risk procedure. METS <4  -Ordered for EKG for patient (last EKG 7/2017), not performed yet  -Patient is medically optimized for this procedure (pending results of EKG). After procedure will need incentive spirometry, pain control  -Per primary team, continue TPN with NPO (except 2 candies per day)  -Octreotide 450mcg IV daily  -PPX with Lovenox 40mg bid    2. HTN - stable and well controlled  -Continue Losartan 50mg PO daily    3. Anxiety - appears stable  -Continue Lexapro 20mg PO daily    Case discussed with Dr Euceda, will continue to follow with you

## 2018-01-30 ENCOUNTER — RESULT REVIEW (OUTPATIENT)
Age: 58
End: 2018-01-30

## 2018-01-30 LAB
-  DAPTOMYCIN: SIGNIFICANT CHANGE UP
-  LINEZOLID: SIGNIFICANT CHANGE UP
-  PENICILLIN: SIGNIFICANT CHANGE UP
ANION GAP SERPL CALC-SCNC: 14 MMOL/L — SIGNIFICANT CHANGE UP (ref 5–17)
APPEARANCE UR: CLEAR — SIGNIFICANT CHANGE UP
BASE EXCESS BLDA CALC-SCNC: -1.6 MMOL/L — SIGNIFICANT CHANGE UP (ref -2–3)
BASOPHILS NFR BLD AUTO: 0.1 % — SIGNIFICANT CHANGE UP (ref 0–2)
BILIRUB UR-MCNC: NEGATIVE — SIGNIFICANT CHANGE UP
BLD GP AB SCN SERPL QL: NEGATIVE — SIGNIFICANT CHANGE UP
BUN SERPL-MCNC: 21 MG/DL — SIGNIFICANT CHANGE UP (ref 7–23)
CA-I BLDA-SCNC: 1.13 MMOL/L — SIGNIFICANT CHANGE UP (ref 1.12–1.3)
CALCIUM SERPL-MCNC: 8.4 MG/DL — SIGNIFICANT CHANGE UP (ref 8.4–10.5)
CHLORIDE SERPL-SCNC: 100 MMOL/L — SIGNIFICANT CHANGE UP (ref 96–108)
CO2 SERPL-SCNC: 21 MMOL/L — LOW (ref 22–31)
COHGB MFR BLDA: 0.2 % — SIGNIFICANT CHANGE UP
COLOR SPEC: YELLOW — SIGNIFICANT CHANGE UP
CREAT SERPL-MCNC: 0.69 MG/DL — SIGNIFICANT CHANGE UP (ref 0.5–1.3)
DIFF PNL FLD: NEGATIVE — SIGNIFICANT CHANGE UP
EOSINOPHIL NFR BLD AUTO: 0.3 % — SIGNIFICANT CHANGE UP (ref 0–6)
GAS PNL BLDA: SIGNIFICANT CHANGE UP
GLUCOSE BLDC GLUCOMTR-MCNC: 103 MG/DL — HIGH (ref 70–99)
GLUCOSE BLDC GLUCOMTR-MCNC: 133 MG/DL — HIGH (ref 70–99)
GLUCOSE SERPL-MCNC: 129 MG/DL — HIGH (ref 70–99)
GLUCOSE UR QL: NEGATIVE — SIGNIFICANT CHANGE UP
HCG UR QL: NEGATIVE — SIGNIFICANT CHANGE UP
HCO3 BLDA-SCNC: 23 MMOL/L — SIGNIFICANT CHANGE UP (ref 21–28)
HCT VFR BLD CALC: 27.6 % — LOW (ref 34.5–45)
HCT VFR BLD CALC: 32.3 % — LOW (ref 34.5–45)
HGB BLD-MCNC: 8.6 G/DL — LOW (ref 11.5–15.5)
HGB BLD-MCNC: 9.9 G/DL — LOW (ref 11.5–15.5)
HGB BLDA-MCNC: 10.5 G/DL — LOW (ref 11.5–15.5)
KETONES UR-MCNC: NEGATIVE — SIGNIFICANT CHANGE UP
LACTATE SERPL-SCNC: 4.2 MMOL/L — CRITICAL HIGH (ref 0.5–2)
LACTATE SERPL-SCNC: 5.6 MMOL/L — CRITICAL HIGH (ref 0.5–2)
LEUKOCYTE ESTERASE UR-ACNC: NEGATIVE — SIGNIFICANT CHANGE UP
LYMPHOCYTES # BLD AUTO: 12.3 % — LOW (ref 13–44)
MAGNESIUM SERPL-MCNC: 1.4 MG/DL — LOW (ref 1.6–2.6)
MCHC RBC-ENTMCNC: 26.1 PG — LOW (ref 27–34)
MCHC RBC-ENTMCNC: 26.1 PG — LOW (ref 27–34)
MCHC RBC-ENTMCNC: 30.7 G/DL — LOW (ref 32–36)
MCHC RBC-ENTMCNC: 31.2 G/DL — LOW (ref 32–36)
MCV RBC AUTO: 83.9 FL — SIGNIFICANT CHANGE UP (ref 80–100)
MCV RBC AUTO: 85 FL — SIGNIFICANT CHANGE UP (ref 80–100)
METHGB MFR BLDA: 0.1 % — SIGNIFICANT CHANGE UP
MONOCYTES NFR BLD AUTO: 8.7 % — SIGNIFICANT CHANGE UP (ref 2–14)
NEUTROPHILS NFR BLD AUTO: 78.6 % — HIGH (ref 43–77)
NITRITE UR-MCNC: NEGATIVE — SIGNIFICANT CHANGE UP
O2 CT VFR BLDA CALC: SIGNIFICANT CHANGE UP (ref 15–23)
OXYHGB MFR BLDA: 99 % — SIGNIFICANT CHANGE UP (ref 94–100)
PCO2 BLDA: 38 MMHG — SIGNIFICANT CHANGE UP (ref 32–45)
PH BLDA: 7.4 — SIGNIFICANT CHANGE UP (ref 7.35–7.45)
PH UR: 7.5 — SIGNIFICANT CHANGE UP (ref 5–8)
PHOSPHATE SERPL-MCNC: 3.6 MG/DL — SIGNIFICANT CHANGE UP (ref 2.5–4.5)
PLATELET # BLD AUTO: 323 K/UL — SIGNIFICANT CHANGE UP (ref 150–400)
PLATELET # BLD AUTO: 478 K/UL — HIGH (ref 150–400)
PO2 BLDA: 148 MMHG — HIGH (ref 83–108)
POTASSIUM BLDA-SCNC: 4.4 MMOL/L — SIGNIFICANT CHANGE UP (ref 3.5–4.9)
POTASSIUM SERPL-MCNC: 4.2 MMOL/L — SIGNIFICANT CHANGE UP (ref 3.5–5.3)
POTASSIUM SERPL-SCNC: 4.2 MMOL/L — SIGNIFICANT CHANGE UP (ref 3.5–5.3)
PROT UR-MCNC: NEGATIVE MG/DL — SIGNIFICANT CHANGE UP
RBC # BLD: 3.29 M/UL — LOW (ref 3.8–5.2)
RBC # BLD: 3.8 M/UL — SIGNIFICANT CHANGE UP (ref 3.8–5.2)
RBC # FLD: 17 % — HIGH (ref 10.3–16.9)
RBC # FLD: 17 % — HIGH (ref 10.3–16.9)
RH IG SCN BLD-IMP: POSITIVE — SIGNIFICANT CHANGE UP
SAO2 % BLDA: 99 % — SIGNIFICANT CHANGE UP (ref 95–100)
SODIUM BLDA-SCNC: 131 MMOL/L — LOW (ref 138–146)
SODIUM SERPL-SCNC: 135 MMOL/L — SIGNIFICANT CHANGE UP (ref 135–145)
SP GR SPEC: 1.01 — SIGNIFICANT CHANGE UP (ref 1–1.03)
UROBILINOGEN FLD QL: 0.2 E.U./DL — SIGNIFICANT CHANGE UP
WBC # BLD: 14.5 K/UL — HIGH (ref 3.8–10.5)
WBC # BLD: 23.6 K/UL — HIGH (ref 3.8–10.5)
WBC # FLD AUTO: 14.5 K/UL — HIGH (ref 3.8–10.5)
WBC # FLD AUTO: 23.6 K/UL — HIGH (ref 3.8–10.5)

## 2018-01-30 PROCEDURE — 99291 CRITICAL CARE FIRST HOUR: CPT

## 2018-01-30 PROCEDURE — 44125 REMOVAL OF SMALL INTESTINE: CPT | Mod: 62

## 2018-01-30 PROCEDURE — 23397 MUSCLE TRANSFERS: CPT | Mod: 62

## 2018-01-30 PROCEDURE — 99233 SBSQ HOSP IP/OBS HIGH 50: CPT

## 2018-01-30 PROCEDURE — 71045 X-RAY EXAM CHEST 1 VIEW: CPT | Mod: 26

## 2018-01-30 RX ORDER — ACETAMINOPHEN 500 MG
1000 TABLET ORAL ONCE
Qty: 0 | Refills: 0 | Status: COMPLETED | OUTPATIENT
Start: 2018-01-30 | End: 2018-01-30

## 2018-01-30 RX ORDER — MAGNESIUM SULFATE 500 MG/ML
2 VIAL (ML) INJECTION
Qty: 0 | Refills: 0 | Status: COMPLETED | OUTPATIENT
Start: 2018-01-30 | End: 2018-01-30

## 2018-01-30 RX ORDER — LINEZOLID 600 MG/300ML
INJECTION, SOLUTION INTRAVENOUS
Qty: 0 | Refills: 0 | Status: DISCONTINUED | OUTPATIENT
Start: 2018-01-30 | End: 2018-01-30

## 2018-01-30 RX ORDER — ONDANSETRON 8 MG/1
4 TABLET, FILM COATED ORAL EVERY 6 HOURS
Qty: 0 | Refills: 0 | Status: DISCONTINUED | OUTPATIENT
Start: 2018-01-30 | End: 2018-02-05

## 2018-01-30 RX ORDER — SODIUM CHLORIDE 9 MG/ML
2000 INJECTION INTRAMUSCULAR; INTRAVENOUS; SUBCUTANEOUS ONCE
Qty: 0 | Refills: 0 | Status: COMPLETED | OUTPATIENT
Start: 2018-01-30 | End: 2018-01-31

## 2018-01-30 RX ORDER — VANCOMYCIN HCL 1 G
VIAL (EA) INTRAVENOUS
Qty: 0 | Refills: 0 | Status: DISCONTINUED | OUTPATIENT
Start: 2018-01-30 | End: 2018-01-31

## 2018-01-30 RX ORDER — SODIUM CHLORIDE 9 MG/ML
1000 INJECTION, SOLUTION INTRAVENOUS
Qty: 0 | Refills: 0 | Status: DISCONTINUED | OUTPATIENT
Start: 2018-01-30 | End: 2018-01-31

## 2018-01-30 RX ORDER — HYDROMORPHONE HYDROCHLORIDE 2 MG/ML
0.5 INJECTION INTRAMUSCULAR; INTRAVENOUS; SUBCUTANEOUS
Qty: 0 | Refills: 0 | Status: DISCONTINUED | OUTPATIENT
Start: 2018-01-30 | End: 2018-02-02

## 2018-01-30 RX ORDER — MICAFUNGIN SODIUM 100 MG/1
100 INJECTION, POWDER, LYOPHILIZED, FOR SOLUTION INTRAVENOUS EVERY 24 HOURS
Qty: 0 | Refills: 0 | Status: DISCONTINUED | OUTPATIENT
Start: 2018-01-31 | End: 2018-01-31

## 2018-01-30 RX ORDER — SODIUM CHLORIDE 9 MG/ML
1000 INJECTION INTRAMUSCULAR; INTRAVENOUS; SUBCUTANEOUS ONCE
Qty: 0 | Refills: 0 | Status: COMPLETED | OUTPATIENT
Start: 2018-01-30 | End: 2018-01-30

## 2018-01-30 RX ORDER — PIPERACILLIN AND TAZOBACTAM 4; .5 G/20ML; G/20ML
3.38 INJECTION, POWDER, LYOPHILIZED, FOR SOLUTION INTRAVENOUS EVERY 6 HOURS
Qty: 0 | Refills: 0 | Status: DISCONTINUED | OUTPATIENT
Start: 2018-01-30 | End: 2018-01-31

## 2018-01-30 RX ORDER — DIPHENHYDRAMINE HCL 50 MG
25 CAPSULE ORAL ONCE
Qty: 0 | Refills: 0 | Status: COMPLETED | OUTPATIENT
Start: 2018-01-30 | End: 2018-01-31

## 2018-01-30 RX ORDER — VANCOMYCIN HCL 1 G
1000 VIAL (EA) INTRAVENOUS EVERY 12 HOURS
Qty: 0 | Refills: 0 | Status: DISCONTINUED | OUTPATIENT
Start: 2018-01-31 | End: 2018-01-31

## 2018-01-30 RX ORDER — MICAFUNGIN SODIUM 100 MG/1
INJECTION, POWDER, LYOPHILIZED, FOR SOLUTION INTRAVENOUS
Qty: 0 | Refills: 0 | Status: DISCONTINUED | OUTPATIENT
Start: 2018-01-30 | End: 2018-01-31

## 2018-01-30 RX ORDER — VANCOMYCIN HCL 1 G
1000 VIAL (EA) INTRAVENOUS ONCE
Qty: 0 | Refills: 0 | Status: COMPLETED | OUTPATIENT
Start: 2018-01-30 | End: 2018-01-30

## 2018-01-30 RX ORDER — MICAFUNGIN SODIUM 100 MG/1
100 INJECTION, POWDER, LYOPHILIZED, FOR SOLUTION INTRAVENOUS ONCE
Qty: 0 | Refills: 0 | Status: COMPLETED | OUTPATIENT
Start: 2018-01-30 | End: 2018-01-30

## 2018-01-30 RX ORDER — INSULIN LISPRO 100/ML
VIAL (ML) SUBCUTANEOUS
Qty: 0 | Refills: 0 | Status: DISCONTINUED | OUTPATIENT
Start: 2018-01-30 | End: 2018-02-15

## 2018-01-30 RX ORDER — PANTOPRAZOLE SODIUM 20 MG/1
40 TABLET, DELAYED RELEASE ORAL DAILY
Qty: 0 | Refills: 0 | Status: DISCONTINUED | OUTPATIENT
Start: 2018-01-30 | End: 2018-01-31

## 2018-01-30 RX ORDER — ONDANSETRON 8 MG/1
4 TABLET, FILM COATED ORAL EVERY 6 HOURS
Qty: 0 | Refills: 0 | Status: DISCONTINUED | OUTPATIENT
Start: 2018-01-30 | End: 2018-01-30

## 2018-01-30 RX ORDER — ENOXAPARIN SODIUM 100 MG/ML
40 INJECTION SUBCUTANEOUS AT BEDTIME
Qty: 0 | Refills: 0 | Status: DISCONTINUED | OUTPATIENT
Start: 2018-01-30 | End: 2018-01-31

## 2018-01-30 RX ORDER — ONDANSETRON 8 MG/1
4 TABLET, FILM COATED ORAL ONCE
Qty: 0 | Refills: 0 | Status: COMPLETED | OUTPATIENT
Start: 2018-01-30 | End: 2018-01-30

## 2018-01-30 RX ORDER — NOREPINEPHRINE BITARTRATE/D5W 8 MG/250ML
0.1 PLASTIC BAG, INJECTION (ML) INTRAVENOUS
Qty: 8 | Refills: 0 | Status: DISCONTINUED | OUTPATIENT
Start: 2018-01-30 | End: 2018-01-31

## 2018-01-30 RX ADMIN — PANTOPRAZOLE SODIUM 40 MILLIGRAM(S): 20 TABLET, DELAYED RELEASE ORAL at 05:42

## 2018-01-30 RX ADMIN — PIPERACILLIN AND TAZOBACTAM 200 GRAM(S): 4; .5 INJECTION, POWDER, LYOPHILIZED, FOR SOLUTION INTRAVENOUS at 18:47

## 2018-01-30 RX ADMIN — Medication 250 MILLIGRAM(S): at 18:58

## 2018-01-30 RX ADMIN — PANTOPRAZOLE SODIUM 40 MILLIGRAM(S): 20 TABLET, DELAYED RELEASE ORAL at 14:43

## 2018-01-30 RX ADMIN — ONDANSETRON 4 MILLIGRAM(S): 8 TABLET, FILM COATED ORAL at 17:00

## 2018-01-30 RX ADMIN — HYDROMORPHONE HYDROCHLORIDE 0.5 MILLIGRAM(S): 2 INJECTION INTRAMUSCULAR; INTRAVENOUS; SUBCUTANEOUS at 19:12

## 2018-01-30 RX ADMIN — Medication 50 GRAM(S): at 15:35

## 2018-01-30 RX ADMIN — ENOXAPARIN SODIUM 40 MILLIGRAM(S): 100 INJECTION SUBCUTANEOUS at 05:42

## 2018-01-30 RX ADMIN — Medication 400 MILLIGRAM(S): at 23:00

## 2018-01-30 RX ADMIN — Medication 50 GRAM(S): at 19:27

## 2018-01-30 RX ADMIN — Medication 1 APPLICATION(S): at 05:42

## 2018-01-30 RX ADMIN — Medication 1000 MILLIGRAM(S): at 23:18

## 2018-01-30 RX ADMIN — Medication 50 MILLIGRAM(S): at 05:42

## 2018-01-30 RX ADMIN — Medication 13.21 MICROGRAM(S)/KG/MIN: at 15:33

## 2018-01-30 RX ADMIN — SODIUM CHLORIDE 2000 MILLILITER(S): 9 INJECTION INTRAMUSCULAR; INTRAVENOUS; SUBCUTANEOUS at 19:33

## 2018-01-30 RX ADMIN — HYDROMORPHONE HYDROCHLORIDE 0.5 MILLIGRAM(S): 2 INJECTION INTRAMUSCULAR; INTRAVENOUS; SUBCUTANEOUS at 18:44

## 2018-01-30 RX ADMIN — NYSTATIN CREAM 1 APPLICATION(S): 100000 CREAM TOPICAL at 05:45

## 2018-01-30 RX ADMIN — ENOXAPARIN SODIUM 40 MILLIGRAM(S): 100 INJECTION SUBCUTANEOUS at 22:08

## 2018-01-30 RX ADMIN — SODIUM CHLORIDE 120 MILLILITER(S): 9 INJECTION, SOLUTION INTRAVENOUS at 00:04

## 2018-01-30 RX ADMIN — MICAFUNGIN SODIUM 105 MILLIGRAM(S): 100 INJECTION, POWDER, LYOPHILIZED, FOR SOLUTION INTRAVENOUS at 21:52

## 2018-01-30 NOTE — BRIEF OPERATIVE NOTE - OPERATION/FINDINGS
multiple enterocutaneous fistulas in the left abdomen on exam. Ex lap, extensive lysis of adhesions. Both BP and harmony limb involved  in enterocutaneous fistulas and resected en bloc with overlying skin. BP limb staple re-anastomosed, extra Navarrete hand-sewn anastomosis created because first anastomosis slightly narrowed. Harmony limb anastomosed hand-sewn. For closure, fascial flap dissected over anterior fascia. External oblique release bilaterally. Fascia then closed primarily with interrupted PDS. CHECO drains bilateral in subQ. Nylon vertical mattress to close skin. Provene vac placed.

## 2018-01-30 NOTE — BRIEF OPERATIVE NOTE - ASSISTANT(S)
Bon
Jeffery PGY3
Dr Copeland
[facility]
Valente Stout PGY5
Latrell Powell PGY4
[facility]

## 2018-01-30 NOTE — CONSULT NOTE ADULT - SUBJECTIVE AND OBJECTIVE BOX
HPI: 57 F s/p  esophagojejunostomy revision c/b EC fistula now s/p medical optimization of EC fistula and return to OR for EC fistula take down and subsequent revision of her aamir-en-y ()   She received 3L Crystalloid, and had 550cc UOP and 500cc EBL.  Post extubation and pain control she required phenylephrine gtt and given 1L LR in PACU by anesthesia. Now under SICU care for further HD monitoring.        ICU Vital Signs Last 24 Hrs  T(F): 97.4 (18 @ 13:35), Max: 98.8 (18 @ 21:45)  HR: 82 (18 @ 14:15) (61 - 82)  BP: 105/68 (18 @ 14:15) (76/53 - 135/82)  BP(mean): 80 (18 @ 14:15) (71 - 80)  ABP: 106/72 (18 @ 14:15)  RR: 18 (18 @ 14:15) (14 - 18)  SpO2: 96% (18 @ 14:15) (93% - 100%)    PHYSICAL EXAM:     Gen: Age appropriate obese female in NAD, A&Ox3, following commands, KELLEY.  Cardiovascular: RRR  Respiratory: CTAB with good air movement to b/l bases, no wheezing/crackles/rhonchi  Gastrointestinal: soft, appropriately tender at incision, nondistended. Midline wound covered with prevena vac, with adequate suction with minimal s/s output. B/L lower quadrant subcutaneous JPs draining s/s fluid to bulb suction.  Extremities: WWP, with minimal peripheral/dependent edema  : stone in place draining clear yellow urine  Vascular: no cyanosis/erythema  Skin: No rashes  MSK: no joint swelling/deformities appreciated        LABS:        140  |  101  |  27<H>  ----------------------------<  118<H>  4.4   |  29  |  0.61    Ca    9.5      2018 07:42  Phos  3.5       Mg     2.1         TPro  7.1  /  Alb  3.0<L>  /  TBili  0.5  /  DBili  x   /  AST  21  /  ALT  31  /  AlkPhos  220<H>    LIVER FUNCTIONS - ( 2018 07:42 )  Alb: 3.0 g/dL / Pro: 7.1 g/dL / ALK PHOS: 220 U/L / ALT: 31 U/L / AST: 21 U/L / GGT: x                               10.5   4.8   )-----------( 220      ( 2018 07:42 )             34.5     ABG - ( 2018 11:15 )  pH: 7.40  /  pCO2: 38    /  pO2: 148   / HCO3: 23    / Base Excess: -1.6  /  SaO2: x               Urinalysis Basic - ( 2018 00:41 )    Color: Yellow / Appearance: Clear / S.015 / pH: x  Gluc: x / Ketone: NEGATIVE  / Bili: Negative / Urobili: 0.2 E.U./dL   Blood: x / Protein: NEGATIVE mg/dL / Nitrite: NEGATIVE   Leuk Esterase: NEGATIVE / RBC: x / WBC x   Sq Epi: x / Non Sq Epi: x / Bacteria: x    CAPILLARY BLOOD GLUCOSE      POCT Blood Glucose.: 103 mg/dL (2018 06:03)  POCT Blood Glucose.: 102 mg/dL (2018 23:55)  POCT Blood Glucose.: 130 mg/dL (2018 21:09)

## 2018-01-30 NOTE — PROGRESS NOTE ADULT - SUBJECTIVE AND OBJECTIVE BOX
O/N: BRAYDEN, VSS, TPN stopped, started on D5  1/29: Med consult: Medically optimized for surgery. OVERNIGHT EVENTS:  BRAYDEN, VSS, TPN stopped, started on D5  : Med consult: Medically optimized for surgery.   OVERNIGHT EVENTS:    SUBJECTIVE: Patient denies any complaints   Flatus: [X] YES [] NO             Bowel Movement: [X ] YES [ ] NO  Pain (0-10):            Pain Control Adequate: [X ] YES [ ] NO  Nausea: [ ] YES [ X] NO            Vomiting: [ ] YES [X ] NO  Diarrhea: [ ] YES [ X] NO         Constipation: [ ] YES [ X] NO     Chest Pain: [ ] YES [ X] NO    SOB:  [ ] YES [ X] NO    enoxaparin Injectable 40 milliGRAM(s) SubCutaneous two times a day  heparin  flush 100 Units/mL Injectable 100 Unit(s) IV Push every other day  losartan 50 milliGRAM(s) Oral daily      Vital Signs Last 24 Hrs  T(C): 36.1 (2018 06:07), Max: 37.1 (2018 14:34)  T(F): 96.9 (2018 06:07), Max: 98.8 (2018 21:45)  HR: 61 (2018 06:07) (61 - 84)  BP: 131/82 (2018 06:07) (108/69 - 135/82)  BP(mean): --  RR: 17 (2018 06:07) (16 - 18)  SpO2: 95% (2018 06:07) (93% - 95%)  I&O's Detail    2018 07:01  -  2018 07:00  --------------------------------------------------------  IN:    dextrose 5% + sodium chloride 0.45%: 960 mL    TPN (Total Parenteral Nutrition): 292 mL  Total IN: 1252 mL    OUT:    Drain: 100 mL    Voided: 300 mL  Total OUT: 400 mL    Total NET: 852 mL          General: NAD, resting comfortably in bed  C/V: NSR  Pulm: Nonlabored breathing, no respiratory distress  Abd: soft, NT/ND.  Extrem: WWP, no edema, SCDs in place        LABS:                        10.5   4.8   )-----------( 220      ( 2018 07:42 )             34.5         140  |  101  |  27<H>  ----------------------------<  118<H>  4.4   |  29  |  0.61    Ca    9.5      2018 07:42  Phos  3.5       Mg     2.1         TPro  7.1  /  Alb  3.0<L>  /  TBili  0.5  /  DBili  x   /  AST  21  /  ALT  31  /  AlkPhos  220<H>        Urinalysis Basic - ( 2018 00:41 )    Color: Yellow / Appearance: Clear / S.015 / pH: x  Gluc: x / Ketone: NEGATIVE  / Bili: Negative / Urobili: 0.2 E.U./dL   Blood: x / Protein: NEGATIVE mg/dL / Nitrite: NEGATIVE   Leuk Esterase: NEGATIVE / RBC: x / WBC x   Sq Epi: x / Non Sq Epi: x / Bacteria: x        RADIOLOGY & ADDITIONAL STUDIES:

## 2018-01-30 NOTE — BRIEF OPERATIVE NOTE - POST-OP DX
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

## 2018-01-30 NOTE — PROGRESS NOTE ADULT - SUBJECTIVE AND OBJECTIVE BOX
Psychiatry Follow Up Note    Patient continues to be seen by myself and psychology intern Ange Yeung for supportive psychotherapy 2-3 times weekly.  Continues to deny acute symptoms of depression or anxiety.  Has been anticipating surgery.  Coping with long hospitalization.    Continue psychiatric medication regimen as ordered.    Millie Hatfield MD  [facility] Attending

## 2018-01-30 NOTE — PROGRESS NOTE ADULT - SUBJECTIVE AND OBJECTIVE BOX
INTERVAL HPI/OVERNIGHT EVENTS:    ID asked to follow up on this patient.   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  Has been treated for multiple infections including abdominal wound (2017), and MRSA bacteremia twice (Oct - 2017).  has been off antibiotics since 2017.  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  She has been on TPN through central line for months.  Last central line was placed in left IJ one month ago.  On 2018 she went to OR for enterocutaneous fistula repair and revision of aamir-en-y. Post op she has been increasing hypotensive requiring pressors.  Now with elevated lactate and increasing WBC        CONSTITUTIONAL:  Negative fever or chills, feels fatigued   EYES:  Negative  blurry vision or double vision  CARDIOVASCULAR:  Negative for chest pain or palpitations  RESPIRATORY:  Negative for cough, wheezing, or SOB   GASTROINTESTINAL:  Negative for nausea, vomiting, diarrhea, constipation, or + abdominal pain   GENITOURINARY:  Negative frequency, urgency or dysuria  NEUROLOGIC:  No headache, confusion, dizziness, lightheadedness      ANTIBIOTICS/RELEVANT:    MEDICATIONS  (STANDING):  acetaminophen  IVPB. 1000 milliGRAM(s) IV Intermittent once  enoxaparin Injectable 40 milliGRAM(s) SubCutaneous at bedtime  insulin lispro (HumaLOG) corrective regimen sliding scale   SubCutaneous Before meals and at bedtime  lactated ringers. 1000 milliLiter(s) (110 mL/Hr) IV Continuous <Continuous>  magnesium sulfate  IVPB 2 Gram(s) IV Intermittent every 1 hour  micafungin IVPB      micafungin IVPB 100 milliGRAM(s) IV Intermittent once  norepinephrine Infusion 0.1 MICROgram(s)/kG/Min (13.214 mL/Hr) IV Continuous <Continuous>  pantoprazole  Injectable 40 milliGRAM(s) IV Push daily  piperacillin/tazobactam IVPB. 3.375 Gram(s) IV Intermittent every 6 hours  sodium chloride 0.9% Bolus 1000 milliLiter(s) IV Bolus once  vancomycin  IVPB        MEDICATIONS  (PRN):  HYDROmorphone  Injectable 0.5 milliGRAM(s) IV Push every 3 hours PRN Moderate Pain (4 - 6)  LORazepam   Injectable 0.25 milliGRAM(s) IV Push every 4 hours PRN Anxiety  ondansetron Injectable 4 milliGRAM(s) IV Push every 6 hours PRN Nausea and/or Vomiting        Vital Signs Last 24 Hrs  T(C): 36.3 (2018 13:35), Max: 37.1 (2018 21:45)  T(F): 97.4 (2018 13:35), Max: 98.8 (2018 21:45)  HR: 105 (2018 18:30) (61 - 110)  BP: 116/68 (2018 18:30) (76/53 - 131/82)  BP(mean): 88 (2018 18:30) (61 - 88)  RR: 16 (2018 18:30) (14 - 25)  SpO2: 98% (2018 18:30) (95% - 100%)    PHYSICAL EXAM:  Constitutional:  no acute distress  Eyes: no icterus  Ear/Nose/Throat: no oral lesion, no sinus tenderness on percussion	  Neck:no JVD, no lymphadenopathy, supple; left IJ  Respiratory: CTA eileen  Cardiovascular: S1S2 RRR, no murmurs  Gastrointestinal:  midline wound covered with vac, no surrounding erythema, 2 bulbs in place  : + stone  Extremities:no edema  Vascular: DP Pulse:	right normal; left normal      LABS:                        9.9    23.6  )-----------( 478      ( 2018 17:38 )             32.3         135  |  100  |  21  ----------------------------<  129<H>  4.2   |  21<L>  |  0.69    Ca    8.4      2018 14:09  Phos  3.6       Mg     1.4         TPro  7.1  /  Alb  3.0<L>  /  TBili  0.5  /  DBili  x   /  AST  21  /  ALT  31  /  AlkPhos  220<H>        Urinalysis Basic - ( 2018 00:41 )    Color: Yellow / Appearance: Clear / S.015 / pH: x  Gluc: x / Ketone: NEGATIVE  / Bili: Negative / Urobili: 0.2 E.U./dL   Blood: x / Protein: NEGATIVE mg/dL / Nitrite: NEGATIVE   Leuk Esterase: NEGATIVE / RBC: x / WBC x   Sq Epi: x / Non Sq Epi: x / Bacteria: x        MICROBIOLOGY:  Culture - Blood (18 @ 09:11)    Specimen Source: .Blood Blood    Culture Results:   No growth at 5 days.    Culture - Surgical Swab (17 @ 16:17)    Gram Stain:   Moderate Gram Negative Rods  Few White blood cells    -  Penicillin: S 2    -  Ampicillin: S <=2    -  Linezolid: S 2    -  Vancomycin: R >16    -  Daptomycin: S 2    Specimen Source: .Surgical Swab abd wound    Culture Results:   Numerous Gram Negative Rods (3 Types)  Moderate Enterococcus faecalis (vancomycin resistant)  Result called to and read back byDilcia Joseph RN  2017 15:30:27  More than three types of organisms recovered may indicate colonization  and/or contamination.  Please call Microbiology immediately if identification and/or  suceptibility is indicated.    Organism Identification: Enterococcus faecalis (vancomycin resistant)    Organism: Enterococcus faecalis (vancomycin resistant)    Method Type: ALISHA        RADIOLOGY & ADDITIONAL STUDIES:

## 2018-01-30 NOTE — PROGRESS NOTE ADULT - ASSESSMENT
57 F s/p  SBR, fistula resection, esophagojejunostomy revision w/ post-op course complicated by RTOR for distal anastomosis breakdown, ATN, acute respiratory failure, & septic shock, MRSA bacteremia which resolved. Currently for wound care management and surgical planning.    1. enterocutanous fistula  - wound care- ostomy manager to [facility]  - NPO/ TPN/2 sucking candy/day  - AM labs, weekly albumin, pre-albumin and triglyceride (every friday).    -OR 1/30    2. Hx of DVT   - Lovenox    3. Depression/ Anxiety   - escitalopram  - diazepam  - Gabapentin     4. GERD  - Protonix    5. PPX  OOB/ PT/ Is

## 2018-01-30 NOTE — CONSULT NOTE ADULT - ATTENDING COMMENTS
Pt seen in recovery room and on Levo with concentrated urine and lactate 4.2. Repeat hemoglobin hemoconcentrated and liter bolus of Ns being given. Pt c/o nausea without any pain at this time. Repeat lactate post bolus and ID attending Dr. Carey called. Will added Micafungin and continue vanco and zosyn. Prior VRe sens to Amp. Continue pressors. Comfortable on NCO2.

## 2018-01-30 NOTE — PROGRESS NOTE ADULT - ASSESSMENT
IMPRESSION:  Suspected septic shock, now on pressors.  Needs MRSA coverage and coverage of GI roland.  Patient also at risk for fungal infection given TPN, central line, prolonged hospitalization and abdominal surgery    Recommend:  1. Check 2 sets of blood cultures  2.  Can start vancomycin 1 gram IV q12 to cover MRSA  3.  Can start zosyn 3.375 grams IV q6hrs for GI and enterococcus coverage (will cover her VRE)  4.  Add micafungin 100 mg IV daily  5. If she continues to have increasing pressors or there is evidence of gram negative infection, can give dose of gentamicin 180 mg (3 mg/kg Dosing body weight: which is 60 kg) x 1    ID service will follow

## 2018-01-30 NOTE — CONSULT NOTE ADULT - ASSESSMENT
57 F s/p  esophagojejunostomy revision c/b EC fistula now s/p medical optimization of EC fistula and return to OR for EC fistula take down and subsequent revision of her aamir-en-y (1/30)  Neuro: Dilaudid, acetaminophen PRN   CV: Phenylephrine started immediately post op, MAP goal 65  Pulm: NC  FENGI: NPO. IVF, PPI  : Yu  Endo: ISS  ID: Vanc/Zosyn (1/30-2/4)  Ppx: SCDs  Lines: PICC line to be exchanged, R radial Rodney  Wounds: midline wound with prevena Vac

## 2018-01-30 NOTE — BRIEF OPERATIVE NOTE - PROCEDURE
<<-----Click on this checkbox to enter Procedure Small bowel resection  01/30/2018    Active  LUZ MARINA  Exploratory laparotomy  01/30/2018    Active  LUZ MARINA

## 2018-01-31 LAB
ALBUMIN SERPL ELPH-MCNC: 2.2 G/DL — LOW (ref 3.3–5)
ALBUMIN SERPL ELPH-MCNC: 2.4 G/DL — LOW (ref 3.3–5)
ALBUMIN SERPL ELPH-MCNC: 2.4 G/DL — LOW (ref 3.3–5)
ALP SERPL-CCNC: 152 U/L — HIGH (ref 40–120)
ALP SERPL-CCNC: 157 U/L — HIGH (ref 40–120)
ALP SERPL-CCNC: 161 U/L — HIGH (ref 40–120)
ALT FLD-CCNC: 27 U/L — SIGNIFICANT CHANGE UP (ref 10–45)
ALT FLD-CCNC: 30 U/L — SIGNIFICANT CHANGE UP (ref 10–45)
ALT FLD-CCNC: 31 U/L — SIGNIFICANT CHANGE UP (ref 10–45)
ANION GAP SERPL CALC-SCNC: 16 MMOL/L — SIGNIFICANT CHANGE UP (ref 5–17)
ANION GAP SERPL CALC-SCNC: 17 MMOL/L — SIGNIFICANT CHANGE UP (ref 5–17)
ANION GAP SERPL CALC-SCNC: 20 MMOL/L — HIGH (ref 5–17)
AST SERPL-CCNC: 25 U/L — SIGNIFICANT CHANGE UP (ref 10–40)
AST SERPL-CCNC: 26 U/L — SIGNIFICANT CHANGE UP (ref 10–40)
AST SERPL-CCNC: 28 U/L — SIGNIFICANT CHANGE UP (ref 10–40)
BILIRUB SERPL-MCNC: 0.7 MG/DL — SIGNIFICANT CHANGE UP (ref 0.2–1.2)
BILIRUB SERPL-MCNC: 1.2 MG/DL — SIGNIFICANT CHANGE UP (ref 0.2–1.2)
BILIRUB SERPL-MCNC: 1.6 MG/DL — HIGH (ref 0.2–1.2)
BUN SERPL-MCNC: 24 MG/DL — HIGH (ref 7–23)
BUN SERPL-MCNC: 24 MG/DL — HIGH (ref 7–23)
BUN SERPL-MCNC: 27 MG/DL — HIGH (ref 7–23)
CALCIUM SERPL-MCNC: 8.4 MG/DL — SIGNIFICANT CHANGE UP (ref 8.4–10.5)
CALCIUM SERPL-MCNC: 8.5 MG/DL — SIGNIFICANT CHANGE UP (ref 8.4–10.5)
CALCIUM SERPL-MCNC: 8.5 MG/DL — SIGNIFICANT CHANGE UP (ref 8.4–10.5)
CHLORIDE SERPL-SCNC: 96 MMOL/L — SIGNIFICANT CHANGE UP (ref 96–108)
CHLORIDE SERPL-SCNC: 98 MMOL/L — SIGNIFICANT CHANGE UP (ref 96–108)
CHLORIDE SERPL-SCNC: 98 MMOL/L — SIGNIFICANT CHANGE UP (ref 96–108)
CO2 SERPL-SCNC: 15 MMOL/L — LOW (ref 22–31)
CO2 SERPL-SCNC: 16 MMOL/L — LOW (ref 22–31)
CO2 SERPL-SCNC: 18 MMOL/L — LOW (ref 22–31)
CREAT SERPL-MCNC: 1.51 MG/DL — HIGH (ref 0.5–1.3)
CREAT SERPL-MCNC: 1.61 MG/DL — HIGH (ref 0.5–1.3)
CREAT SERPL-MCNC: 1.75 MG/DL — HIGH (ref 0.5–1.3)
GLUCOSE BLDC GLUCOMTR-MCNC: 111 MG/DL — HIGH (ref 70–99)
GLUCOSE BLDC GLUCOMTR-MCNC: 125 MG/DL — HIGH (ref 70–99)
GLUCOSE BLDC GLUCOMTR-MCNC: 74 MG/DL — SIGNIFICANT CHANGE UP (ref 70–99)
GLUCOSE BLDC GLUCOMTR-MCNC: 96 MG/DL — SIGNIFICANT CHANGE UP (ref 70–99)
GLUCOSE SERPL-MCNC: 103 MG/DL — HIGH (ref 70–99)
GLUCOSE SERPL-MCNC: 117 MG/DL — HIGH (ref 70–99)
GLUCOSE SERPL-MCNC: 125 MG/DL — HIGH (ref 70–99)
HCT VFR BLD CALC: 24.3 % — LOW (ref 34.5–45)
HCT VFR BLD CALC: 25 % — LOW (ref 34.5–45)
HCT VFR BLD CALC: 27.7 % — LOW (ref 34.5–45)
HCT VFR BLD CALC: 30 % — LOW (ref 34.5–45)
HCT VFR BLD CALC: 30.8 % — LOW (ref 34.5–45)
HGB BLD-MCNC: 10 G/DL — LOW (ref 11.5–15.5)
HGB BLD-MCNC: 7.4 G/DL — LOW (ref 11.5–15.5)
HGB BLD-MCNC: 7.6 G/DL — LOW (ref 11.5–15.5)
HGB BLD-MCNC: 9.1 G/DL — LOW (ref 11.5–15.5)
HGB BLD-MCNC: 9.7 G/DL — LOW (ref 11.5–15.5)
LACTATE SERPL-SCNC: 2.7 MMOL/L — HIGH (ref 0.5–2)
LACTATE SERPL-SCNC: 4 MMOL/L — CRITICAL HIGH (ref 0.5–2)
LACTATE SERPL-SCNC: 4.6 MMOL/L — CRITICAL HIGH (ref 0.5–2)
LACTATE SERPL-SCNC: 7.6 MMOL/L — CRITICAL HIGH (ref 0.5–2)
MAGNESIUM SERPL-MCNC: 2.6 MG/DL — SIGNIFICANT CHANGE UP (ref 1.6–2.6)
MCHC RBC-ENTMCNC: 26 PG — LOW (ref 27–34)
MCHC RBC-ENTMCNC: 26.2 PG — LOW (ref 27–34)
MCHC RBC-ENTMCNC: 27 PG — SIGNIFICANT CHANGE UP (ref 27–34)
MCHC RBC-ENTMCNC: 27.2 PG — SIGNIFICANT CHANGE UP (ref 27–34)
MCHC RBC-ENTMCNC: 27.5 PG — SIGNIFICANT CHANGE UP (ref 27–34)
MCHC RBC-ENTMCNC: 30.4 G/DL — LOW (ref 32–36)
MCHC RBC-ENTMCNC: 30.5 G/DL — LOW (ref 32–36)
MCHC RBC-ENTMCNC: 32.3 G/DL — SIGNIFICANT CHANGE UP (ref 32–36)
MCHC RBC-ENTMCNC: 32.5 G/DL — SIGNIFICANT CHANGE UP (ref 32–36)
MCHC RBC-ENTMCNC: 32.9 G/DL — SIGNIFICANT CHANGE UP (ref 32–36)
MCV RBC AUTO: 82.2 FL — SIGNIFICANT CHANGE UP (ref 80–100)
MCV RBC AUTO: 84 FL — SIGNIFICANT CHANGE UP (ref 80–100)
MCV RBC AUTO: 84.8 FL — SIGNIFICANT CHANGE UP (ref 80–100)
MCV RBC AUTO: 85.6 FL — SIGNIFICANT CHANGE UP (ref 80–100)
MCV RBC AUTO: 86.2 FL — SIGNIFICANT CHANGE UP (ref 80–100)
PHOSPHATE SERPL-MCNC: 4.8 MG/DL — HIGH (ref 2.5–4.5)
PLATELET # BLD AUTO: 232 K/UL — SIGNIFICANT CHANGE UP (ref 150–400)
PLATELET # BLD AUTO: 243 K/UL — SIGNIFICANT CHANGE UP (ref 150–400)
PLATELET # BLD AUTO: 251 K/UL — SIGNIFICANT CHANGE UP (ref 150–400)
PLATELET # BLD AUTO: 348 K/UL — SIGNIFICANT CHANGE UP (ref 150–400)
PLATELET # BLD AUTO: 374 K/UL — SIGNIFICANT CHANGE UP (ref 150–400)
POTASSIUM SERPL-MCNC: 4.6 MMOL/L — SIGNIFICANT CHANGE UP (ref 3.5–5.3)
POTASSIUM SERPL-MCNC: 4.8 MMOL/L — SIGNIFICANT CHANGE UP (ref 3.5–5.3)
POTASSIUM SERPL-MCNC: 4.8 MMOL/L — SIGNIFICANT CHANGE UP (ref 3.5–5.3)
POTASSIUM SERPL-SCNC: 4.6 MMOL/L — SIGNIFICANT CHANGE UP (ref 3.5–5.3)
POTASSIUM SERPL-SCNC: 4.8 MMOL/L — SIGNIFICANT CHANGE UP (ref 3.5–5.3)
POTASSIUM SERPL-SCNC: 4.8 MMOL/L — SIGNIFICANT CHANGE UP (ref 3.5–5.3)
PROT SERPL-MCNC: 5.4 G/DL — LOW (ref 6–8.3)
PROT SERPL-MCNC: 5.7 G/DL — LOW (ref 6–8.3)
PROT SERPL-MCNC: 5.8 G/DL — LOW (ref 6–8.3)
RBC # BLD: 2.82 M/UL — LOW (ref 3.8–5.2)
RBC # BLD: 2.92 M/UL — LOW (ref 3.8–5.2)
RBC # BLD: 3.37 M/UL — LOW (ref 3.8–5.2)
RBC # BLD: 3.57 M/UL — LOW (ref 3.8–5.2)
RBC # BLD: 3.63 M/UL — LOW (ref 3.8–5.2)
RBC # FLD: 16 % — SIGNIFICANT CHANGE UP (ref 10.3–16.9)
RBC # FLD: 16.1 % — SIGNIFICANT CHANGE UP (ref 10.3–16.9)
RBC # FLD: 16.1 % — SIGNIFICANT CHANGE UP (ref 10.3–16.9)
RBC # FLD: 16.8 % — SIGNIFICANT CHANGE UP (ref 10.3–16.9)
RBC # FLD: 17.1 % — HIGH (ref 10.3–16.9)
SODIUM SERPL-SCNC: 130 MMOL/L — LOW (ref 135–145)
SODIUM SERPL-SCNC: 131 MMOL/L — LOW (ref 135–145)
SODIUM SERPL-SCNC: 133 MMOL/L — LOW (ref 135–145)
VANCOMYCIN FLD-MCNC: 29.5 UG/ML — SIGNIFICANT CHANGE UP
WBC # BLD: 14.5 K/UL — HIGH (ref 3.8–10.5)
WBC # BLD: 17.3 K/UL — HIGH (ref 3.8–10.5)
WBC # BLD: 18.3 K/UL — HIGH (ref 3.8–10.5)
WBC # BLD: 24.3 K/UL — HIGH (ref 3.8–10.5)
WBC # BLD: 25.4 K/UL — HIGH (ref 3.8–10.5)
WBC # FLD AUTO: 14.5 K/UL — HIGH (ref 3.8–10.5)
WBC # FLD AUTO: 17.3 K/UL — HIGH (ref 3.8–10.5)
WBC # FLD AUTO: 18.3 K/UL — HIGH (ref 3.8–10.5)
WBC # FLD AUTO: 24.3 K/UL — HIGH (ref 3.8–10.5)
WBC # FLD AUTO: 25.4 K/UL — HIGH (ref 3.8–10.5)

## 2018-01-31 PROCEDURE — 99291 CRITICAL CARE FIRST HOUR: CPT

## 2018-01-31 PROCEDURE — 23397 MUSCLE TRANSFERS: CPT | Mod: 62

## 2018-01-31 PROCEDURE — 71045 X-RAY EXAM CHEST 1 VIEW: CPT | Mod: 26

## 2018-01-31 PROCEDURE — 44125 REMOVAL OF SMALL INTESTINE: CPT | Mod: 62

## 2018-01-31 RX ORDER — MICAFUNGIN SODIUM 100 MG/1
100 INJECTION, POWDER, LYOPHILIZED, FOR SOLUTION INTRAVENOUS EVERY 24 HOURS
Qty: 0 | Refills: 0 | Status: DISCONTINUED | OUTPATIENT
Start: 2018-01-31 | End: 2018-02-05

## 2018-01-31 RX ORDER — ACETAMINOPHEN 500 MG
1000 TABLET ORAL ONCE
Qty: 0 | Refills: 0 | Status: COMPLETED | OUTPATIENT
Start: 2018-01-31 | End: 2018-02-03

## 2018-01-31 RX ORDER — ALBUMIN HUMAN 25 %
250 VIAL (ML) INTRAVENOUS ONCE
Qty: 0 | Refills: 0 | Status: COMPLETED | OUTPATIENT
Start: 2018-01-31 | End: 2018-01-31

## 2018-01-31 RX ORDER — SODIUM CHLORIDE 9 MG/ML
1000 INJECTION INTRAMUSCULAR; INTRAVENOUS; SUBCUTANEOUS ONCE
Qty: 0 | Refills: 0 | Status: DISCONTINUED | OUTPATIENT
Start: 2018-01-31 | End: 2018-01-31

## 2018-01-31 RX ORDER — PIPERACILLIN AND TAZOBACTAM 4; .5 G/20ML; G/20ML
2.25 INJECTION, POWDER, LYOPHILIZED, FOR SOLUTION INTRAVENOUS EVERY 6 HOURS
Qty: 0 | Refills: 0 | Status: DISCONTINUED | OUTPATIENT
Start: 2018-01-31 | End: 2018-02-05

## 2018-01-31 RX ORDER — ONDANSETRON 8 MG/1
4 TABLET, FILM COATED ORAL ONCE
Qty: 0 | Refills: 0 | Status: COMPLETED | OUTPATIENT
Start: 2018-01-31 | End: 2018-01-31

## 2018-01-31 RX ORDER — SODIUM CHLORIDE 9 MG/ML
1000 INJECTION, SOLUTION INTRAVENOUS
Qty: 0 | Refills: 0 | Status: DISCONTINUED | OUTPATIENT
Start: 2018-01-31 | End: 2018-02-01

## 2018-01-31 RX ORDER — SODIUM CHLORIDE 9 MG/ML
1000 INJECTION, SOLUTION INTRAVENOUS
Qty: 0 | Refills: 0 | Status: DISCONTINUED | OUTPATIENT
Start: 2018-01-31 | End: 2018-01-31

## 2018-01-31 RX ORDER — DEXTROSE 50 % IN WATER 50 %
25 SYRINGE (ML) INTRAVENOUS ONCE
Qty: 0 | Refills: 0 | Status: COMPLETED | OUTPATIENT
Start: 2018-01-31 | End: 2018-01-31

## 2018-01-31 RX ORDER — HEPARIN SODIUM 5000 [USP'U]/ML
5000 INJECTION INTRAVENOUS; SUBCUTANEOUS EVERY 8 HOURS
Qty: 0 | Refills: 0 | Status: DISCONTINUED | OUTPATIENT
Start: 2018-01-31 | End: 2018-02-23

## 2018-01-31 RX ORDER — SODIUM CHLORIDE 9 MG/ML
1000 INJECTION INTRAMUSCULAR; INTRAVENOUS; SUBCUTANEOUS ONCE
Qty: 0 | Refills: 0 | Status: COMPLETED | OUTPATIENT
Start: 2018-01-31 | End: 2018-01-31

## 2018-01-31 RX ADMIN — SODIUM CHLORIDE 1000 MILLILITER(S): 9 INJECTION INTRAMUSCULAR; INTRAVENOUS; SUBCUTANEOUS at 10:01

## 2018-01-31 RX ADMIN — HYDROMORPHONE HYDROCHLORIDE 0.5 MILLIGRAM(S): 2 INJECTION INTRAMUSCULAR; INTRAVENOUS; SUBCUTANEOUS at 07:38

## 2018-01-31 RX ADMIN — PIPERACILLIN AND TAZOBACTAM 200 GRAM(S): 4; .5 INJECTION, POWDER, LYOPHILIZED, FOR SOLUTION INTRAVENOUS at 15:01

## 2018-01-31 RX ADMIN — HYDROMORPHONE HYDROCHLORIDE 0.5 MILLIGRAM(S): 2 INJECTION INTRAMUSCULAR; INTRAVENOUS; SUBCUTANEOUS at 07:09

## 2018-01-31 RX ADMIN — HYDROMORPHONE HYDROCHLORIDE 0.5 MILLIGRAM(S): 2 INJECTION INTRAMUSCULAR; INTRAVENOUS; SUBCUTANEOUS at 12:53

## 2018-01-31 RX ADMIN — HYDROMORPHONE HYDROCHLORIDE 0.5 MILLIGRAM(S): 2 INJECTION INTRAMUSCULAR; INTRAVENOUS; SUBCUTANEOUS at 20:10

## 2018-01-31 RX ADMIN — HYDROMORPHONE HYDROCHLORIDE 0.5 MILLIGRAM(S): 2 INJECTION INTRAMUSCULAR; INTRAVENOUS; SUBCUTANEOUS at 17:40

## 2018-01-31 RX ADMIN — PIPERACILLIN AND TAZOBACTAM 200 GRAM(S): 4; .5 INJECTION, POWDER, LYOPHILIZED, FOR SOLUTION INTRAVENOUS at 22:21

## 2018-01-31 RX ADMIN — ONDANSETRON 4 MILLIGRAM(S): 8 TABLET, FILM COATED ORAL at 20:00

## 2018-01-31 RX ADMIN — PIPERACILLIN AND TAZOBACTAM 200 GRAM(S): 4; .5 INJECTION, POWDER, LYOPHILIZED, FOR SOLUTION INTRAVENOUS at 00:27

## 2018-01-31 RX ADMIN — Medication 250 MILLILITER(S): at 18:10

## 2018-01-31 RX ADMIN — ONDANSETRON 4 MILLIGRAM(S): 8 TABLET, FILM COATED ORAL at 03:39

## 2018-01-31 RX ADMIN — Medication 250 MILLIGRAM(S): at 07:10

## 2018-01-31 RX ADMIN — HYDROMORPHONE HYDROCHLORIDE 0.5 MILLIGRAM(S): 2 INJECTION INTRAMUSCULAR; INTRAVENOUS; SUBCUTANEOUS at 10:47

## 2018-01-31 RX ADMIN — ONDANSETRON 4 MILLIGRAM(S): 8 TABLET, FILM COATED ORAL at 00:27

## 2018-01-31 RX ADMIN — ONDANSETRON 4 MILLIGRAM(S): 8 TABLET, FILM COATED ORAL at 13:20

## 2018-01-31 RX ADMIN — HYDROMORPHONE HYDROCHLORIDE 0.5 MILLIGRAM(S): 2 INJECTION INTRAMUSCULAR; INTRAVENOUS; SUBCUTANEOUS at 14:39

## 2018-01-31 RX ADMIN — HEPARIN SODIUM 5000 UNIT(S): 5000 INJECTION INTRAVENOUS; SUBCUTANEOUS at 22:20

## 2018-01-31 RX ADMIN — PIPERACILLIN AND TAZOBACTAM 200 GRAM(S): 4; .5 INJECTION, POWDER, LYOPHILIZED, FOR SOLUTION INTRAVENOUS at 07:08

## 2018-01-31 RX ADMIN — MICAFUNGIN SODIUM 105 MILLIGRAM(S): 100 INJECTION, POWDER, LYOPHILIZED, FOR SOLUTION INTRAVENOUS at 22:20

## 2018-01-31 RX ADMIN — Medication 0.25 MILLIGRAM(S): at 22:44

## 2018-01-31 RX ADMIN — Medication 25 MILLILITER(S): at 22:44

## 2018-01-31 RX ADMIN — Medication 25 MILLIGRAM(S): at 00:27

## 2018-01-31 RX ADMIN — HYDROMORPHONE HYDROCHLORIDE 0.5 MILLIGRAM(S): 2 INJECTION INTRAMUSCULAR; INTRAVENOUS; SUBCUTANEOUS at 20:24

## 2018-01-31 RX ADMIN — SODIUM CHLORIDE 2000 MILLILITER(S): 9 INJECTION INTRAMUSCULAR; INTRAVENOUS; SUBCUTANEOUS at 00:34

## 2018-01-31 NOTE — PROCEDURE NOTE - NSINFORMCONSENT_GEN_A_CORE
(HCP)/Benefits, risks, and possible complications of procedure explained to patient/caregiver who verbalized understanding and gave written consent.
Benefits, risks, and possible complications of procedure explained to patient/caregiver who verbalized understanding and gave written consent.

## 2018-01-31 NOTE — CONSULT NOTE ADULT - SUBJECTIVE AND OBJECTIVE BOX
PAIN MANAGEMENT CONSUlT NOTE    Incomplete note below    56F w/PMHx of HTN, gastric bypass in 2002 c/b stricture, corkscrewed sleeve and chronic leak, revised on 11/28/16 with protracted (70-day) postoperative course complicated by MDR infections, a C-diff infection, respiratory compromise due to plugging/PNA/effusions requiring multiple interventions and a trach, as well as a continued leak requiring a draining double-pigtail with stenting performed by GI. 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 Now post op Ex-lap and SBR. ID following septic shock and on vasopressors.     PAST MEDICAL & SURGICAL HISTORY:  Reflux  Obesity  DVT (deep venous thrombosis): 2013 neck  Diverticulitis  Hypertension  Elective surgery: abodominal wall surgery  dec 2016  Elective surgery: NOV 2016 gastric bypass revision  Gastric bypass status for obesity: gastric sleeve, 6/2012  S/P breast biopsy: 2011, left  S/P colon resection: 2011  S/P knee surgery: repair 2009, 2011  right; left  Umbilical hernia: 2000  S/P appendectomy: 1975  S/P tonsillectomy: 1967    PLAN/RECOMMENDATIONS:      CALL NP: [phone] for questions with plan above, any acute changes in pain throughout day, significant difficulties with activities , or side effects/adverse effects with pain medications  CALL Dr Tony Julio for weekend call: [phone]    HPI:  56F w/PMHx of HTN, gastric bypass in 2002 c/b stricture, corkscrewed sleeve and chronic leak, revised on 11/28/16 with protracted (70-day) postoperative course complicated by MDR infections, a C-diff infection, respiratory compromise due to plugging/PNA/effusions requiring multiple interventions and a trach, as well as a continued leak requiring a draining double-pigtail with stenting performed by GI. 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 Denies CP, SOB, N/V, diarrhea. (24 Jul 2017 14:15)      PAST MEDICAL & SURGICAL HISTORY:  Reflux  Obesity  DVT (deep venous thrombosis): 2013 neck  Diverticulitis  Hypertension  Elective surgery: abodominal wall surgery  dec 2016  Elective surgery: NOV 2016 gastric bypass revision  Gastric bypass status for obesity: gastric sleeve, 6/2012  S/P breast biopsy: 2011, left  S/P colon resection: 2011  S/P knee surgery: repair 2009, 2011  right; left  Umbilical hernia: 2000  S/P appendectomy: 1975  S/P tonsillectomy: 1967      PAIN MEDICATIONS:  acetaminophen  IVPB. 1000 milliGRAM(s) IV Intermittent once  HYDROmorphone  Injectable 0.5 milliGRAM(s) IV Push every 3 hours PRN  LORazepam   Injectable 0.25 milliGRAM(s) IV Push every 4 hours PRN  ondansetron Injectable 4 milliGRAM(s) IV Push every 6 hours PRN    Heme:  heparin  flush 100 Units/mL Injectable 100 Unit(s) IV Push every other day  heparin  Injectable 5000 Unit(s) SubCutaneous every 8 hours    Antibiotics:  micafungin IVPB 100 milliGRAM(s) IV Intermittent every 24 hours  piperacillin/tazobactam IVPB. 2.25 Gram(s) IV Intermittent every 6 hours    Cardiovascular:  norepinephrine Infusion 0.1 MICROgram(s)/kG/Min IV Continuous <Continuous>    GI:    Endocrine:  insulin lispro (HumaLOG) corrective regimen sliding scale   SubCutaneous Before meals and at bedtime    All Other Medications:  albumin human  5% IVPB 250 milliLiter(s) IV Intermittent once  sodium chloride 0.45% 1000 milliLiter(s) IV Continuous <Continuous>      Vital Signs Last 24 Hrs  T(C): 35.8 (31 Jan 2018 14:20), Max: 36.7 (31 Jan 2018 05:00)  T(F): 96.5 (31 Jan 2018 14:20), Max: 98.1 (31 Jan 2018 05:00)  HR: 92 (31 Jan 2018 16:01) (90 - 114)  BP: 116/72 (31 Jan 2018 16:01) (87/59 - 133/71)  BP(mean): 88 (31 Jan 2018 16:01) (67 - 109)  RR: 13 (31 Jan 2018 16:01) (13 - 33)  SpO2: 98% (31 Jan 2018 16:01) (93% - 99%)    LABS:      REVIEW OF SYSTEMS:  CONSTITUTIONAL: __ fever or fatigue O/N.   NECK/ENT: No pain or stiffness. No throat pain  RESPIRATORY: No cough, wheezing; No shortness of breath  CARDIOVASCULAR: No chest pain, palpitations.   GASTROINTESTINAL: Pt reports ___ passing gas. No bowel movements. No abdominal or epigastric pain. No nausea, vomiting. GENITOURINARY: No dysuria, frequency, or incontinence. ___ Yu on ___  NEUROLOGICAL: headaches, ____ loss of strength, ____ numbness, or tremors. No dizziness or lightheadedness with pain medications.   MUSCULOSKELETAL: Incisional back pain. ___ radicular pain in ___ pattern. ____ muscle stiffness/cramping No joint pain or swelling.      FUNCTIONAL ASSESSMENT:  PAIN SCORE AT REST:         SCALE USED: (1-10 VNRS)  PAIN SCORE WITH ACTIVITY:         SCALE USED: (1-10 VNRS)    PAIN ASSESSMENT:    PHYSICAL EXAM  GENERAL: NAD, pt ___  NECK: Supple, ___ collar  NERVOUS SYSTEM:    Alert & Oriented X3, Good concentration;   Cranial nerves grossly intact  Motor exam:         [] Upper extremity            Bi(c5)  WE(c6)  EE(c7)   FF(c8)                                                R         5/5        5/5        5/5       5/5                                               L          5/5        5/5        5/5       5/5         [] Lower extremity          HF(l2)   KE(l3)    TA(l4)   EHL(l5)  GS(s1)                                                 R        5/5        5/5        5/5       5/5         5/5                                               L         5/5        5/5       5/5       5/5          5/5         [] warm well perfused; capillary refill <3 seconds   Sensation intact to LT in UE/LE in 3 dermatomes    Cervical: No facet tenderness. Negative Spurlings sign. Negative Mobley's sign. Cervical ROM not assessed, s/p surgery and restricted turning.    Lumbar: Incisional tenderness. Neg SLR __ . Negative XSLR ___ . Lumbar ROM not assessed, s/p surgery and restricted turning.    CHEST/LUNG: Clear to auscultation bilaterally; No rales, rhonchi, wheezing, or rubs  HEART: Regular rate and rhythm; No murmurs, rubs, or gallops  ABDOMEN: Soft, Nontender, Nondistended; Bowel sounds present  EXTREMITIES:  2+ Peripheral Pulses, No clubbing, cyanosis, or edema  SKIN: No rashes or lesions. Incision _____.      ASSESSMENT:       PLAN:   1. Opioids  Since yesterday 6am, pt has required:     PCA Setting:   - Opioids:   - Initial Bolus:   - Initial Demand:   - Lockout:   - Continuous Rate:   - 4 hour limit:     PLAN: C/w . Transition to . LA not required. Pt receiving adequete coverage. First step. For rescue dosing, will order .      2. Neuropathics  Pt currently denies neuropathic pain. No numbness/tingling/electric shock like sensation in LE/UE b.l.    PLAN: No indication for neuropathic agents.     3. Adjuvants  Pt ___ complaining of muscle spasms/cramping in neck/back. Tylenol 650mg q6h PRN for Mild Pain. Pt on/not on Percocet.     PLAN: C/w     4. Prophylactic:   Bowel regimen: Docusate Senna;    Nausea PRN: Zofran PRN for Nausea, pt does not c/o current    5. Functional Goals:   Pt will get OOB with PT today. Pt will resume previous level of activity without impairment from surgery.     6. Additional Consults:   None recommended.     7. Additional Labs/Imaging:   None recommended.     8. Follow up, Discharge Planning:   Patient is set for discharge to:   Discharge is pending:   Pain Management follow up plan: PAIN MANAGEMENT CONSUlT NOTE    ASSESSMENT:   56F w/PMHx of HTN, gastric bypass in 2002 c/b stricture, corkscrewed sleeve and chronic leak, revised on 11/28/16 with protracted (70-day) postoperative course complicated by MDR infections, a C-diff infection, respiratory compromise due to plugging/PNA/effusions requiring multiple interventions and a trach, as well as a continued leak requiring a draining double-pigtail with stenting performed by GI. Pt had longstanding enterocutaneous fistula which she presented during this hospitalization for resection of. Now post op Ex-lap and SBR. ID following, pt in septic shock and on vasopressors. Pt has hx of questionable opioid dependence/abuse and difficult course with pain control following prior operations. Multiple documented reports since last hospitalization of maladaptive behaviors with dc of Dilaudid IVP and reports of suicidal ideation with dc. Per corroboration from current team, pt has had multiple requests for increase of dosage, addition of IV Benadryl. Pt currently is complaining of vicelike pain primarily in epigastrum with generalized abdominal tenderness which is primarily brought on with transfer from bed to chair and movement in bed. Otherwise, reports hx of chronic low back pain, but denies imaging, would corroborate.    RECOMMENDATIONS:   - For now, would not recommend escalation of current dosing.   - Recommend change to PO regimen as soon as tolerating PO.  - Patient may be candidate for abuse deterrent forms of pain medications will re-visit, will corroborate with psych concerning patient willingness how to best approach.    Please call [phone] if any questions.    PAST MEDICAL & SURGICAL HISTORY:  Reflux  Obesity  DVT (deep venous thrombosis): 2013 neck  Diverticulitis  Hypertension  Elective surgery: abodominal wall surgery  dec 2016  Elective surgery: NOV 2016 gastric bypass revision  Gastric bypass status for obesity: gastric sleeve, 6/2012  S/P breast biopsy: 2011, left  S/P colon resection: 2011  S/P knee surgery: repair 2009, 2011  right; left  Umbilical hernia: 2000  S/P appendectomy: 1975  S/P tonsillectomy: 1967    CALL NP: [phone] for questions with plan above, any acute changes in pain throughout day, significant difficulties with activities , or side effects/adverse effects with pain medications  CALL Dr Tony Julio for weekend call: [phone]    HPI:  56F w/PMHx of HTN, gastric bypass in 2002 c/b stricture, corkscrewed sleeve and chronic leak, revised on 11/28/16 with protracted (70-day) postoperative course complicated by MDR infections, a C-diff infection, respiratory compromise due to plugging/PNA/effusions requiring multiple interventions and a trach, as well as a continued leak requiring a draining double-pigtail with stenting performed by GI. 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 Denies CP, SOB, N/V, diarrhea. (24 Jul 2017 14:15)      PAST MEDICAL & SURGICAL HISTORY:  Reflux  Obesity  DVT (deep venous thrombosis): 2013 neck  Diverticulitis  Hypertension  Elective surgery: abodominal wall surgery  dec 2016  Elective surgery: NOV 2016 gastric bypass revision  Gastric bypass status for obesity: gastric sleeve, 6/2012  S/P breast biopsy: 2011, left  S/P colon resection: 2011  S/P knee surgery: repair 2009, 2011  right; left  Umbilical hernia: 2000  S/P appendectomy: 1975  S/P tonsillectomy: 1967      PAIN MEDICATIONS:  acetaminophen  IVPB. 1000 milliGRAM(s) IV Intermittent once  HYDROmorphone  Injectable 0.5 milliGRAM(s) IV Push every 3 hours PRN  LORazepam   Injectable 0.25 milliGRAM(s) IV Push every 4 hours PRN  ondansetron Injectable 4 milliGRAM(s) IV Push every 6 hours PRN    Heme:  heparin  flush 100 Units/mL Injectable 100 Unit(s) IV Push every other day  heparin  Injectable 5000 Unit(s) SubCutaneous every 8 hours    Antibiotics:  micafungin IVPB 100 milliGRAM(s) IV Intermittent every 24 hours  piperacillin/tazobactam IVPB. 2.25 Gram(s) IV Intermittent every 6 hours    Cardiovascular:  norepinephrine Infusion 0.1 MICROgram(s)/kG/Min IV Continuous <Continuous>    GI:    Endocrine:  insulin lispro (HumaLOG) corrective regimen sliding scale   SubCutaneous Before meals and at bedtime    All Other Medications:  albumin human  5% IVPB 250 milliLiter(s) IV Intermittent once  sodium chloride 0.45% 1000 milliLiter(s) IV Continuous <Continuous>      Vital Signs Last 24 Hrs  T(C): 35.8 (31 Jan 2018 14:20), Max: 36.7 (31 Jan 2018 05:00)  T(F): 96.5 (31 Jan 2018 14:20), Max: 98.1 (31 Jan 2018 05:00)  HR: 92 (31 Jan 2018 16:01) (90 - 114)  BP: 116/72 (31 Jan 2018 16:01) (87/59 - 133/71)  BP(mean): 88 (31 Jan 2018 16:01) (67 - 109)  RR: 13 (31 Jan 2018 16:01) (13 - 33)  SpO2: 98% (31 Jan 2018 16:01) (93% - 99%)    LABS:      REVIEW OF SYSTEMS:  CONSTITUTIONAL: No fever or fatigue O/N.   NECK/ENT: No pain or stiffness. No throat pain  RESPIRATORY: No cough, wheezing; No shortness of breath  CARDIOVASCULAR: No chest pain, palpitations.   GASTROINTESTINAL: No BM since preop. Reporting primary epigastric pain. No nausea, vomiting.   NEUROLOGICAL: No headaches, no loss of strength, no numbness, or tremors. No dizziness or lightheadedness with pain medications.     FUNCTIONAL ASSESSMENT:  PAIN SCORE AT REST:   10/10      SCALE USED: (1-10 VNRS)  PAIN SCORE WITH ACTIVITY:    10/10     SCALE USED: (1-10 VNRS)    PAIN ASSESSMENT:  see pain hpi above.    PHYSICAL EXAM  GENERAL: Obese female sitting up in chair.   NECK: Supple  NERVOUS SYSTEM:    Alert & Oriented X3, Good concentration;   Cranial nerves grossly intact  Motor exam:  KELLEY x 4, 5/5 in 4 extr.          [X] warm well perfused; capillary refill <3 seconds   Sensation intact to LT in UE/LE in 3 dermatomes  ABDOMEN: TTP (deep), only mild with LT x 4 quad, distended--baseline, compressible, hypoactive BS.     ASSESSMENT:     RECS:   1. Opioids  Since yesterday post op, pt has required frequent PRN Dosing Dilaudid    - For now, would not recommend escalation of current dosing.   - Recommend change to PO regimen as soon as tolerating PO.    2. Neuropathics  Pt currently denies neuropathic pain. No numbness/tingling/electric shock like sensation in LE/UE b.l.  - No indication for neuropathic agents.     3. Adjuvants  Pt not complaining of abdominal cramping, hx of apparent low back pain r/t DDD.   - Recommend frequent IVT q8h for opioid sparing effect, somatic/abdominal pain best addressed    4. Prophylactic:   Bowel regimen: Docusate Senna;    Nausea PRN: Zofran PRN for Nausea, pt does not c/o current    5. Functional Goals:   Pt will get OOB with PT today. Pt will resume previous level of activity without impairment from surgery.     6. Additional Consults:   None recommended.     7. Additional Labs/Imaging:   None recommended.     8. Follow up, Discharge Planning:   Patient is set for discharge to: Pending ptot eval  Discharge is pending: Pending ptot eval  Pain Management follow up plan: C/t follow inpatient. PAIN MANAGEMENT CONSUlT NOTE    ASSESSMENT:   56F w/PMHx of HTN, gastric bypass in 2002 c/b stricture, corkscrewed sleeve and chronic leak, revised on 11/28/16 with protracted (70-day) postoperative course complicated by MDR infections, a C-diff infection, respiratory compromise due to plugging/PNA/effusions requiring multiple interventions and a trach, as well as a continued leak requiring a draining double-pigtail with stenting performed by GI. Pt had longstanding enterocutaneous fistula which she presented during this hospitalization for resection of. Now post op Ex-lap and SBR. ID following, pt in septic shock and on vasopressors. Pt has hx of questionable opioid dependence/abuse and difficult course with pain control following prior operations. Multiple documented reports since last hospitalization of maladaptive behaviors with dc of Dilaudid IVP and reports of suicidal ideation with dc. Per corroboration from current team, pt has had multiple requests for increase of dosage, addition of IV Benadryl. Pt currently is complaining of vicelike pain primarily in epigastrum with generalized abdominal tenderness which is primarily brought on with transfer from bed to chair and movement in bed. Otherwise, reports hx of chronic low back pain, but denies imaging, would corroborate.    RECOMMENDATIONS:   - For now, would not recommend escalation of current dosing.   - Recommend change to PO regimen as soon as tolerating PO.  - Patient may be candidate for abuse deterrent forms of pain medications will re-visit, will corroborate with psych given extensive hx with tx patient and how to best approach.    Please call [phone] if any questions.    PAST MEDICAL & SURGICAL HISTORY:  Reflux  Obesity  DVT (deep venous thrombosis): 2013 neck  Diverticulitis  Hypertension  Elective surgery: abodominal wall surgery  dec 2016  Elective surgery: NOV 2016 gastric bypass revision  Gastric bypass status for obesity: gastric sleeve, 6/2012  S/P breast biopsy: 2011, left  S/P colon resection: 2011  S/P knee surgery: repair 2009, 2011  right; left  Umbilical hernia: 2000  S/P appendectomy: 1975  S/P tonsillectomy: 1967    CALL NP: [phone] for questions with plan above, any acute changes in pain throughout day, significant difficulties with activities , or side effects/adverse effects with pain medications  CALL Dr Tony Julio for weekend call: [phone]    HPI:  56F w/PMHx of HTN, gastric bypass in 2002 c/b stricture, corkscrewed sleeve and chronic leak, revised on 11/28/16 with protracted (70-day) postoperative course complicated by MDR infections, a C-diff infection, respiratory compromise due to plugging/PNA/effusions requiring multiple interventions and a trach, as well as a continued leak requiring a draining double-pigtail with stenting performed by GI. 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 Denies CP, SOB, N/V, diarrhea. (24 Jul 2017 14:15)      PAST MEDICAL & SURGICAL HISTORY:  Reflux  Obesity  DVT (deep venous thrombosis): 2013 neck  Diverticulitis  Hypertension  Elective surgery: abodominal wall surgery  dec 2016  Elective surgery: NOV 2016 gastric bypass revision  Gastric bypass status for obesity: gastric sleeve, 6/2012  S/P breast biopsy: 2011, left  S/P colon resection: 2011  S/P knee surgery: repair 2009, 2011  right; left  Umbilical hernia: 2000  S/P appendectomy: 1975  S/P tonsillectomy: 1967      PAIN MEDICATIONS:  acetaminophen  IVPB. 1000 milliGRAM(s) IV Intermittent once  HYDROmorphone  Injectable 0.5 milliGRAM(s) IV Push every 3 hours PRN  LORazepam   Injectable 0.25 milliGRAM(s) IV Push every 4 hours PRN  ondansetron Injectable 4 milliGRAM(s) IV Push every 6 hours PRN    Heme:  heparin  flush 100 Units/mL Injectable 100 Unit(s) IV Push every other day  heparin  Injectable 5000 Unit(s) SubCutaneous every 8 hours    Antibiotics:  micafungin IVPB 100 milliGRAM(s) IV Intermittent every 24 hours  piperacillin/tazobactam IVPB. 2.25 Gram(s) IV Intermittent every 6 hours    Cardiovascular:  norepinephrine Infusion 0.1 MICROgram(s)/kG/Min IV Continuous <Continuous>    GI:    Endocrine:  insulin lispro (HumaLOG) corrective regimen sliding scale   SubCutaneous Before meals and at bedtime    All Other Medications:  albumin human  5% IVPB 250 milliLiter(s) IV Intermittent once  sodium chloride 0.45% 1000 milliLiter(s) IV Continuous <Continuous>      Vital Signs Last 24 Hrs  T(C): 35.8 (31 Jan 2018 14:20), Max: 36.7 (31 Jan 2018 05:00)  T(F): 96.5 (31 Jan 2018 14:20), Max: 98.1 (31 Jan 2018 05:00)  HR: 92 (31 Jan 2018 16:01) (90 - 114)  BP: 116/72 (31 Jan 2018 16:01) (87/59 - 133/71)  BP(mean): 88 (31 Jan 2018 16:01) (67 - 109)  RR: 13 (31 Jan 2018 16:01) (13 - 33)  SpO2: 98% (31 Jan 2018 16:01) (93% - 99%)    LABS:      REVIEW OF SYSTEMS:  CONSTITUTIONAL: No fever or fatigue O/N.   NECK/ENT: No pain or stiffness. No throat pain  RESPIRATORY: No cough, wheezing; No shortness of breath  CARDIOVASCULAR: No chest pain, palpitations.   GASTROINTESTINAL: No BM since preop. Reporting primary epigastric pain. No nausea, vomiting.   NEUROLOGICAL: No headaches, no loss of strength, no numbness, or tremors. No dizziness or lightheadedness with pain medications.     FUNCTIONAL ASSESSMENT:  PAIN SCORE AT REST:   10/10      SCALE USED: (1-10 VNRS)  PAIN SCORE WITH ACTIVITY:    10/10     SCALE USED: (1-10 VNRS)    PAIN ASSESSMENT:  see pain hpi above.    PHYSICAL EXAM  GENERAL: Obese female sitting up in chair.   NECK: Supple  NERVOUS SYSTEM:    Alert & Oriented X3, Good concentration;   Cranial nerves grossly intact  Motor exam:  KELLEY x 4, 5/5 in 4 extr.          [X] warm well perfused; capillary refill <3 seconds   Sensation intact to LT in UE/LE in 3 dermatomes  ABDOMEN: TTP (deep), only mild with LT x 4 quad, distended--baseline, compressible, hypoactive BS.     ASSESSMENT:     RECS:   1. Opioids  Since yesterday post op, pt has required frequent PRN Dosing Dilaudid    - For now, would not recommend escalation of current dosing.   - Recommend change to PO regimen as soon as tolerating PO.    2. Neuropathics  Pt currently denies neuropathic pain. No numbness/tingling/electric shock like sensation in LE/UE b.l.  - No indication for neuropathic agents.     3. Adjuvants  Pt not complaining of abdominal cramping, hx of apparent low back pain r/t DDD.   - Recommend frequent IVT q8h for opioid sparing effect, somatic/abdominal pain best addressed    4. Prophylactic:   Bowel regimen: Docusate Senna;    Nausea PRN: Zofran PRN for Nausea, pt does not c/o current    5. Functional Goals:   Pt will get OOB with PT today. Pt will resume previous level of activity without impairment from surgery.     6. Additional Consults:   None recommended.     7. Additional Labs/Imaging:   None recommended.     8. Follow up, Discharge Planning:   Patient is set for discharge to: Pending ptot eval  Discharge is pending: Pending ptot eval  Pain Management follow up plan: C/t follow inpatient.

## 2018-01-31 NOTE — PROGRESS NOTE ADULT - SUBJECTIVE AND OBJECTIVE BOX
s/p extensive laporotomy and small bowel resection  extubated heart rate is in the 90's on levophed  has metabolic acidosis which I think is that still requires resuscitation  will change from levophed to vasopressin   will give two units of prbcs  and continue to give fluids  no repsiratory distress  give 1 liter now

## 2018-01-31 NOTE — PROCEDURE NOTE - PROCEDURE DATE TIME, MLM
01-Dec-2017 23:57
03-Jan-2018 12:39
04-Oct-2017 17:02
08-Sep-2017 00:38
10-Aug-2017 18:55
25-Jul-2017 13:40
31-Jan-2018 11:42
29-Jul-2017 20:47

## 2018-01-31 NOTE — PROGRESS NOTE ADULT - SUBJECTIVE AND OBJECTIVE BOX
Psychiatry Follow Up Note    Patient continues to be seen by myself and psychology intern Ange Yeung for supportive psychotherapy 2-3 times weekly.  Seen at bedside following surgery.  Reports post-op nausea, now improving.  Continues to deny acute symptoms of depression or anxiety.  Coping with long hospitalization.    Restart regular psychiatric medication regimen when appropriate:  Lexapro 20 mg PO daily  Valium 5 mg PO HS    Millie Hatfield MD  [facility] Attending

## 2018-01-31 NOTE — PROCEDURE NOTE - NSPOSTPRCRAD_GEN_A_CORE
post-procedure radiography performed
post-procedure radiography performed/cxr is pending
chest radiograph/line in appropriate postion/tip @ RA-SVC junction
post-procedure radiography performed/cxr is pending

## 2018-01-31 NOTE — PROCEDURE NOTE - NSINDICATIONS_GEN_A_CORE
antibiotic therapy/venous access
critical illness/hypertonic/irritant infusion
hypertonic/irritant infusion/critical illness
hypertonic/irritant infusion/critical illness
hypertonic/irritant infusion/venous access/critical illness
venous access/critical illness
hypertonic/irritant infusion/venous access/critical illness

## 2018-01-31 NOTE — PROCEDURE NOTE - NSANESTHESIA_GEN_A_CORE
2% lidocaine
1% lidocaine

## 2018-01-31 NOTE — PROGRESS NOTE ADULT - ATTENDING COMMENTS
Pt seen this Am and appeared dry and aggressive resuscitation continued with 2 units PRBCs for Hb 7.4 and lactate 7.6. Pt with evidence of hypoperfusion on exam with dry skin and cool extremities. Levophed decreased and now on 3 mcg/min and lactate down to 4 with HB 9.7. Continue albumin and bolus if UO decresing. Pt with ATN and possible SIRS/sepsis so continue abx and adjust for decreased CrCL. Replace bicarb with nonanion gap acidosis and if Cr continues to rise may need to cut back on fluid at some point but not now. OOB, nontoxic and to remain NPO. TLC changed.

## 2018-01-31 NOTE — PROCEDURE NOTE - NSTOLERANCE_GEN_A_CORE
Patient tolerated procedure well.

## 2018-01-31 NOTE — PROCEDURE NOTE - NSPOSTCAREGUIDE_GEN_A_CORE
Verbal/written post procedure instructions were given to patient/caregiver/Care for catheter as per unit/ICU protocols
Care for catheter as per unit/ICU protocols
Verbal/written post procedure instructions were given to patient/caregiver/Keep the cast/splint/dressing clean and dry/Care for catheter as per unit/ICU protocols/Instructed patient/caregiver regarding signs and symptoms of infection

## 2018-01-31 NOTE — PROCEDURE NOTE - ADDITIONAL PROCEDURE DETAILS
cvp is high normal; equal breath sounds; biopatch is applied.
discussed case with resident and Dr. Brady.
cvp is about 10cm,; biopatch is applied.
cvp is about 6cm.; equal breath sounds; biopatch is applied.
cvp is high normal; equal breath sounds; biopatch is applied.
cvp is high normal; equal breath sounds; biopatch is applied.

## 2018-01-31 NOTE — PROGRESS NOTE ADULT - SUBJECTIVE AND OBJECTIVE BOX
INTERVAL HPI/OVERNIGHT EVENTS:  Pt seen and examined at bedside this morning in the SICU, this morning she reports generalized malaise and thirst.  She notes that her abdominal pain is well controlled. She denies CP/SOB/N/V/F/C.      MEDICATIONS  (STANDING):  acetaminophen  IVPB. 1000 milliGRAM(s) IV Intermittent once  heparin  flush 100 Units/mL Injectable 100 Unit(s) IV Push every other day  insulin lispro (HumaLOG) corrective regimen sliding scale   SubCutaneous Before meals and at bedtime  micafungin IVPB 100 milliGRAM(s) IV Intermittent every 24 hours  norepinephrine Infusion 0.1 MICROgram(s)/kG/Min (13.214 mL/Hr) IV Continuous <Continuous>  piperacillin/tazobactam IVPB. 2.25 Gram(s) IV Intermittent every 6 hours  sodium chloride 0.45% 1000 milliLiter(s) (120 mL/Hr) IV Continuous <Continuous>    MEDICATIONS  (PRN):  HYDROmorphone  Injectable 0.5 milliGRAM(s) IV Push every 3 hours PRN Moderate Pain (4 - 6)  LORazepam   Injectable 0.25 milliGRAM(s) IV Push every 4 hours PRN Anxiety  ondansetron Injectable 4 milliGRAM(s) IV Push every 6 hours PRN Nausea and/or Vomiting      Drug Dosing Weight  Height (cm): 160 (01 Aug 2017 10:08)  Weight (kg): 70.477 (2018 07:02)  BMI (kg/m2): 27.5 (2018 07:02)  BSA (m2): 1.74 (2018 07:02)      PAST MEDICAL & SURGICAL HISTORY:  Reflux  Obesity  DVT (deep venous thrombosis):  neck  Diverticulitis  Hypertension  Elective surgery: abodominal wall surgery  dec 2016  Elective surgery: 2016 gastric bypass revision  Gastric bypass status for obesity: gastric sleeve, 2012  S/P breast biopsy: , left  S/P colon resection:   S/P knee surgery: repair ,   right; left  Umbilical hernia:   S/P appendectomy:   S/P tonsillectomy:         ICU Vital Signs Last 24 Hrs  T(C): 35.8 (2018 14:20), Max: 36.7 (2018 05:00)  T(F): 96.5 (2018 14:20), Max: 98.1 (2018 05:00)  HR: 102 (2018 14:00) (92 - 114)  BP: 102/73 (2018 14:00) (85/64 - 133/71)  BP(mean): 81 (2018 14:00) (61 - 109)  ABP: 60/2 (2018 14:00) (60/2 - 138/84)  ABP(mean): 50 (2018 14:00) (50 - 104)  RR: 26 (2018 14:00) (15 - 33)  SpO2: 97% (2018 13:00) (93% - 99%)      ABG - ( 2018 11:15 )  pH: 7.40  /  pCO2: 38    /  pO2: 148   / HCO3: 23    / Base Excess: -1.6  /  SaO2: x           I&O's Detail    2018 07:01  -  2018 07:00  --------------------------------------------------------  IN:    lactated ringers.: 1250 mL    norepinephrine Infusion: 529.1 mL    Sodium Chloride 0.9% IV Bolus: 3000 mL    Solution: 500 mL    Solution: 300 mL  Total IN: 5579.1 mL    OUT:    Bulb: 60 mL    Bulb: 145 mL    Indwelling Catheter - Urethral: 575 mL  Total OUT: 780 mL    Total NET: 4799.1 mL      2018 07:01  -  2018 15:05  --------------------------------------------------------  IN:    lactated ringers.: 450 mL    norepinephrine Infusion: 39.7 mL  Total IN: 489.7 mL    OUT:    Bulb: 10 mL    Bulb: 100 mL    Indwelling Catheter - Urethral: 80 mL  Total OUT: 190 mL    Total NET: 299.7 mL      Physical exam:  Gen: Age appropriate obese female in NAD, A&Ox3, following commands, KELLEY.  HEENT: Dry oral mucosa  Cardiovascular: RRR  Respiratory: CTAB with good air movement to b/l bases, no wheezing/crackles/rhonchi  Gastrointestinal: soft, appropriately tender at incision, nondistended. Midline wound covered with prevena vac, with adequate suction with minimal s/s output. B/L lower quadrant subcutaneous JPs draining s/s fluid to bulb suction.  Extremities: distally cool, with minimal peripheral/dependent edema  : stone in place draining clear dark urine  Vascular: no cyanosis/erythema  Skin: No rashes  MSK: no joint swelling/deformities appreciated    LABS:  CBC Full  -  ( 2018 14:45 )  WBC Count : 17.3 K/uL  Hemoglobin : 9.7 g/dL  Hematocrit : 30.0 %  Platelet Count - Automated : 251 K/uL  Mean Cell Volume : 84.0 fL  Mean Cell Hemoglobin : 27.2 pg  Mean Cell Hemoglobin Concentration : 32.3 g/dL          131<L>  |  98  |  24<H>  ----------------------------<  125<H>  4.8   |  16<L>  |  1.61<H>    Ca    8.4      2018 11:49  Phos  4.8       Mg     2.6         TPro  5.7<L>  /  Alb  2.4<L>  /  TBili  1.2  /  DBili  x   /  AST  28  /  ALT    /  AlkPhos  161<H>        Urinalysis Basic - ( 2018 00:41 )    Color: Yellow / Appearance: Clear / S.015 / pH: x  Gluc: x / Ketone: NEGATIVE  / Bili: Negative / Urobili: 0.2 E.U./dL   Blood: x / Protein: NEGATIVE mg/dL / Nitrite: NEGATIVE   Leuk Esterase: NEGATIVE / RBC: x / WBC x   Sq Epi: x / Non Sq Epi: x / Bacteria: x

## 2018-01-31 NOTE — PROCEDURE NOTE - NSCVLATTEMPTSITEVASC_A_CORE
internal jugular/left
left/internal jugular
left/subclavian vein
subclavian vein/left
subclavian vein/left
left/internal jugular

## 2018-01-31 NOTE — PROCEDURE NOTE - NSPROCDETAILS_GEN_ALL_CORE
location identified, draped/prepped, sterile technique used/ultrasound guidance/sterile dressing applied/sterile technique, catheter placed/supine position/ultrasound assessment
lumen(s) aspirated and flushed/guidewire recovered/sterile dressing applied/sterile technique, catheter placed/ultrasound guidance
lumen(s) aspirated and flushed/guidewire recovered/sterile technique, catheter placed/ultrasound guidance/sterile dressing applied
sterile dressing applied/sterile technique, catheter placed/ultrasound guidance/guidewire recovered/lumen(s) aspirated and flushed
sterile dressing applied/ultrasound guidance/guidewire recovered/sterile technique, catheter placed/lumen(s) aspirated and flushed
sterile technique, catheter placed/sterile dressing applied/lumen(s) aspirated and flushed/ultrasound guidance/guidewire recovered
sterile dressing applied/guidewire recovered/lumen(s) aspirated and flushed/sterile technique, catheter placed/ultrasound guidance

## 2018-01-31 NOTE — PROCEDURE NOTE - NSPROCNAME_GEN_A_CORE
Central Line Insertion
PICC Line Insertion
Central Line Insertion
General

## 2018-01-31 NOTE — CHART NOTE - NSCHARTNOTEFT_GEN_A_CORE
Admitting Diagnosis:   Patient is a 57y old  Female who presents with a chief complaint of enterocutaneous fistula (24 Jul 2017 14:15)      PAST MEDICAL & SURGICAL HISTORY:  Reflux  Obesity  DVT (deep venous thrombosis): 2013 neck  Diverticulitis  Hypertension  Elective surgery: abodominal wall surgery  dec 2016  Elective surgery: NOV 2016 gastric bypass revision  Gastric bypass status for obesity: gastric sleeve, 6/2012  S/P breast biopsy: 2011, left  S/P colon resection: 2011  S/P knee surgery: repair 2009, 2011  right; left  Umbilical hernia: 2000  S/P appendectomy: 1975  S/P tonsillectomy: 1967      Current Nutrition Order:   Diet, NPO (01-30-18 @ 13:45) off TPN. New central line placed today. Re-initiation of PN to be determined.       PO Intake: Good (%) [   ]  Fair (50-75%) [   ] Poor (<25%) [   ]NPO    GI Issues: none reported    Pain: none reported    Skin Integrity: unstageable/ surgical wound(s/p revision of EC fistula, Jigar scale 13    Labs:   01-31    133<L>  |  98  |  24<H>  ----------------------------<  117<H>  4.6   |  15<L>  |  1.51<H>    Ca    8.5      31 Jan 2018 06:19  Phos  4.8     01-31  Mg     2.6     01-31    TPro  5.4<L>  /  Alb  2.2<L>  /  TBili  0.7  /  DBili  x   /  AST  26  /  ALT  30  /  AlkPhos  157<H>  01-31    CAPILLARY BLOOD GLUCOSE      POCT Blood Glucose.: 125 mg/dL (31 Jan 2018 07:37)  POCT Blood Glucose.: 133 mg/dL (30 Jan 2018 22:41)      Medications:  MEDICATIONS  (STANDING):  heparin  flush 100 Units/mL Injectable 100 Unit(s) IV Push every other day  insulin lispro (HumaLOG) corrective regimen sliding scale   SubCutaneous Before meals and at bedtime  lactated ringers. 1000 milliLiter(s) (150 mL/Hr) IV Continuous <Continuous>  micafungin IVPB 100 milliGRAM(s) IV Intermittent every 24 hours  norepinephrine Infusion 0.1 MICROgram(s)/kG/Min (13.214 mL/Hr) IV Continuous <Continuous>  pantoprazole  Injectable 40 milliGRAM(s) IV Push daily  piperacillin/tazobactam IVPB. 2.25 Gram(s) IV Intermittent every 6 hours  sodium chloride 0.9% Bolus 1000 milliLiter(s) IV Bolus once    MEDICATIONS  (PRN):  HYDROmorphone  Injectable 0.5 milliGRAM(s) IV Push every 3 hours PRN Moderate Pain (4 - 6)  LORazepam   Injectable 0.25 milliGRAM(s) IV Push every 4 hours PRN Anxiety  ondansetron Injectable 4 milliGRAM(s) IV Push every 6 hours PRN Nausea and/or Vomiting      Weight:1/30:70.5kg  Daily   1/15 wt:79.5  Daily     Weight Change: 20lb weight loss since 1/15 recorded weights:79.5kg to present weight 70.5kg    Estimated energy needs: IBW used for calculations due to above 120% of IBW. IBW: 52kg. EER  kcal needs:1560-1820kcal(p53-16be/kg of IBW) x1.8-2.0gmprotein/Kg of IBW:94-104gmprotein(for wounds/PU and surgical needs). and 30-35cc fluids per Kg :1560cc-1820ccfluids.1707 NPC  Subjective: Patient is a 57y old  Female who presents with a chief complaint of enterocutaneous fistula (24 Jul 2017 14:15).S/P revision of C fistula. Anastomosis created. Presently NPO/ off TPN. New central line placed this am. Re-initiation of PN to be determined. Now with 2 CHECO drains.  Previous Nutrition Diagnosis: Increased nutrient needs r/t increased demand for nutrients and protein AEB: wound/PU and s/p surgery.     Active [ x  ]  Resolved [   ]    If resolved, new PES:     Goal:Meet 80% of nutrient needs consistently    Recommendations:1.If TPN via Central line is re-considered due to need for GI rest would recommend previous orders for goal rate. Recommend: Day 1:150gm Dextrose /104gmprotein. TPN goal needs: 1752 target volume/run@73cc/hr: 355gmDextrose/104gmprotein /50gm 20% lipids which provides: 2123kcal/140gmprotein/GIR:3.5(@70.5kg) 2.Daily weights 3.Check triglycerides weekly (hold lipids if > 400) 4.Consider alternative route for nutrition orally.Noted previous attempts with Jtube feeds unsuccessful.Diet advancement recommedations orally:clears to low fiber/CCHO no snack/small frequent feeds    Education: Previous education done.Follow-up education will be provided by RD will TPN initiation or alternative route of nutrition      Risk Level: High [  x ] Moderate [   ] Low [   ]

## 2018-01-31 NOTE — PROCEDURE NOTE - NSCVLACTUALSITE_VASC_A_CORE
internal jugular/left
left/internal jugular
left/subclavian vein
left/subclavian vein
subclavian vein/left
internal jugular/left

## 2018-01-31 NOTE — PROCEDURE NOTE - NSSITEPREP_SKIN_A_CORE
chlorhexidine
Adherence to aseptic technique: hand hygiene prior to donning barriers (gown, gloves), don cap and mask, sterile drape over patient/chlorhexidine
Adherence to aseptic technique: hand hygiene prior to donning barriers (gown, gloves), don cap and mask, sterile drape over patient/chlorhexidine
chlorhexidine
chlorhexidine/Adherence to aseptic technique: hand hygiene prior to donning barriers (gown, gloves), don cap and mask, sterile drape over patient

## 2018-01-31 NOTE — PROGRESS NOTE ADULT - ASSESSMENT
57 F s/p  esophagojejunostomy revision c/b EC fistula now s/p medical optimization of EC fistula and return to OR for EC fistula take down and subsequent revision of her aamir-en-y (1/30)    She appears to be under resuscitated this morning, and will continue giving crystalloid and pRBC.    Neuro: Dilaudid, acetaminophen PRN   CV: levophed, MAP goal 65  Pulm: NC  FENGI: NPO. IVF  : Yu  Endo: ISS  ID:  zosyn (1/30-2/4), melany (1/30-2/4), holding Vanc considering ASA  Ppx: SCDs, SQH  Lines: PICC line to be exchanged, R radial Ch[place]rant  Wounds: midline wound with prevena Vac. SubQ CHECO x 2

## 2018-02-01 LAB
ALBUMIN SERPL ELPH-MCNC: 2.6 G/DL — LOW (ref 3.3–5)
ALP SERPL-CCNC: 142 U/L — HIGH (ref 40–120)
ALT FLD-CCNC: 26 U/L — SIGNIFICANT CHANGE UP (ref 10–45)
ANION GAP SERPL CALC-SCNC: 12 MMOL/L — SIGNIFICANT CHANGE UP (ref 5–17)
ANION GAP SERPL CALC-SCNC: 13 MMOL/L — SIGNIFICANT CHANGE UP (ref 5–17)
APPEARANCE UR: CLEAR — SIGNIFICANT CHANGE UP
AST SERPL-CCNC: 25 U/L — SIGNIFICANT CHANGE UP (ref 10–40)
BILIRUB SERPL-MCNC: 0.9 MG/DL — SIGNIFICANT CHANGE UP (ref 0.2–1.2)
BILIRUB UR-MCNC: NEGATIVE — SIGNIFICANT CHANGE UP
BUN SERPL-MCNC: 28 MG/DL — HIGH (ref 7–23)
BUN SERPL-MCNC: 30 MG/DL — HIGH (ref 7–23)
CALCIUM SERPL-MCNC: 8.3 MG/DL — LOW (ref 8.4–10.5)
CALCIUM SERPL-MCNC: 8.6 MG/DL — SIGNIFICANT CHANGE UP (ref 8.4–10.5)
CHLORIDE SERPL-SCNC: 100 MMOL/L — SIGNIFICANT CHANGE UP (ref 96–108)
CHLORIDE SERPL-SCNC: 98 MMOL/L — SIGNIFICANT CHANGE UP (ref 96–108)
CO2 SERPL-SCNC: 23 MMOL/L — SIGNIFICANT CHANGE UP (ref 22–31)
CO2 SERPL-SCNC: 23 MMOL/L — SIGNIFICANT CHANGE UP (ref 22–31)
COLOR SPEC: YELLOW — SIGNIFICANT CHANGE UP
CREAT ?TM UR-MCNC: 21 MG/DL — SIGNIFICANT CHANGE UP
CREAT SERPL-MCNC: 1.76 MG/DL — HIGH (ref 0.5–1.3)
CREAT SERPL-MCNC: 1.85 MG/DL — HIGH (ref 0.5–1.3)
DIFF PNL FLD: (no result)
GLUCOSE BLDC GLUCOMTR-MCNC: 82 MG/DL — SIGNIFICANT CHANGE UP (ref 70–99)
GLUCOSE BLDC GLUCOMTR-MCNC: 83 MG/DL — SIGNIFICANT CHANGE UP (ref 70–99)
GLUCOSE BLDC GLUCOMTR-MCNC: 97 MG/DL — SIGNIFICANT CHANGE UP (ref 70–99)
GLUCOSE SERPL-MCNC: 85 MG/DL — SIGNIFICANT CHANGE UP (ref 70–99)
GLUCOSE SERPL-MCNC: 92 MG/DL — SIGNIFICANT CHANGE UP (ref 70–99)
GLUCOSE UR QL: NEGATIVE — SIGNIFICANT CHANGE UP
HCT VFR BLD CALC: 21.8 % — LOW (ref 34.5–45)
HCT VFR BLD CALC: 22 % — LOW (ref 34.5–45)
HCT VFR BLD CALC: 23.4 % — LOW (ref 34.5–45)
HGB BLD-MCNC: 7.2 G/DL — LOW (ref 11.5–15.5)
HGB BLD-MCNC: 7.4 G/DL — LOW (ref 11.5–15.5)
HGB BLD-MCNC: 7.7 G/DL — LOW (ref 11.5–15.5)
KETONES UR-MCNC: NEGATIVE — SIGNIFICANT CHANGE UP
LACTATE SERPL-SCNC: 1.6 MMOL/L — SIGNIFICANT CHANGE UP (ref 0.5–2)
LEUKOCYTE ESTERASE UR-ACNC: NEGATIVE — SIGNIFICANT CHANGE UP
MCHC RBC-ENTMCNC: 27.4 PG — SIGNIFICANT CHANGE UP (ref 27–34)
MCHC RBC-ENTMCNC: 27.4 PG — SIGNIFICANT CHANGE UP (ref 27–34)
MCHC RBC-ENTMCNC: 27.8 PG — SIGNIFICANT CHANGE UP (ref 27–34)
MCHC RBC-ENTMCNC: 32.9 G/DL — SIGNIFICANT CHANGE UP (ref 32–36)
MCHC RBC-ENTMCNC: 33 G/DL — SIGNIFICANT CHANGE UP (ref 32–36)
MCHC RBC-ENTMCNC: 33.6 G/DL — SIGNIFICANT CHANGE UP (ref 32–36)
MCV RBC AUTO: 82.7 FL — SIGNIFICANT CHANGE UP (ref 80–100)
MCV RBC AUTO: 82.9 FL — SIGNIFICANT CHANGE UP (ref 80–100)
MCV RBC AUTO: 83.3 FL — SIGNIFICANT CHANGE UP (ref 80–100)
NITRITE UR-MCNC: NEGATIVE — SIGNIFICANT CHANGE UP
OSMOLALITY UR: 200 MOSMOL/KG — SIGNIFICANT CHANGE UP (ref 100–650)
PH UR: 5 — SIGNIFICANT CHANGE UP (ref 5–8)
PHOSPHATE SERPL-MCNC: 4.7 MG/DL — HIGH (ref 2.5–4.5)
PLATELET # BLD AUTO: 189 K/UL — SIGNIFICANT CHANGE UP (ref 150–400)
PLATELET # BLD AUTO: 206 K/UL — SIGNIFICANT CHANGE UP (ref 150–400)
PLATELET # BLD AUTO: 223 K/UL — SIGNIFICANT CHANGE UP (ref 150–400)
POTASSIUM SERPL-MCNC: 4.4 MMOL/L — SIGNIFICANT CHANGE UP (ref 3.5–5.3)
POTASSIUM SERPL-MCNC: 4.9 MMOL/L — SIGNIFICANT CHANGE UP (ref 3.5–5.3)
POTASSIUM SERPL-SCNC: 4.4 MMOL/L — SIGNIFICANT CHANGE UP (ref 3.5–5.3)
POTASSIUM SERPL-SCNC: 4.9 MMOL/L — SIGNIFICANT CHANGE UP (ref 3.5–5.3)
PROT SERPL-MCNC: 6 G/DL — SIGNIFICANT CHANGE UP (ref 6–8.3)
PROT UR-MCNC: NEGATIVE MG/DL — SIGNIFICANT CHANGE UP
RBC # BLD: 2.63 M/UL — LOW (ref 3.8–5.2)
RBC # BLD: 2.66 M/UL — LOW (ref 3.8–5.2)
RBC # BLD: 2.81 M/UL — LOW (ref 3.8–5.2)
RBC # FLD: 15.9 % — SIGNIFICANT CHANGE UP (ref 10.3–16.9)
RBC # FLD: 15.9 % — SIGNIFICANT CHANGE UP (ref 10.3–16.9)
RBC # FLD: 16.6 % — SIGNIFICANT CHANGE UP (ref 10.3–16.9)
SODIUM SERPL-SCNC: 133 MMOL/L — LOW (ref 135–145)
SODIUM SERPL-SCNC: 136 MMOL/L — SIGNIFICANT CHANGE UP (ref 135–145)
SODIUM UR-SCNC: 36 MMOL/L — SIGNIFICANT CHANGE UP
SP GR SPEC: 1.01 — SIGNIFICANT CHANGE UP (ref 1–1.03)
UROBILINOGEN FLD QL: 0.2 E.U./DL — SIGNIFICANT CHANGE UP
VANCOMYCIN TROUGH SERPL-MCNC: 25 UG/ML — HIGH (ref 10–20)
WBC # BLD: 10.7 K/UL — HIGH (ref 3.8–10.5)
WBC # BLD: 11.3 K/UL — HIGH (ref 3.8–10.5)
WBC # BLD: 9.9 K/UL — SIGNIFICANT CHANGE UP (ref 3.8–10.5)
WBC # FLD AUTO: 10.7 K/UL — HIGH (ref 3.8–10.5)
WBC # FLD AUTO: 11.3 K/UL — HIGH (ref 3.8–10.5)
WBC # FLD AUTO: 9.9 K/UL — SIGNIFICANT CHANGE UP (ref 3.8–10.5)

## 2018-02-01 PROCEDURE — 99232 SBSQ HOSP IP/OBS MODERATE 35: CPT

## 2018-02-01 PROCEDURE — 76770 US EXAM ABDO BACK WALL COMP: CPT | Mod: 26

## 2018-02-01 PROCEDURE — 99233 SBSQ HOSP IP/OBS HIGH 50: CPT | Mod: GC

## 2018-02-01 RX ORDER — SODIUM CHLORIDE 9 MG/ML
4 INJECTION INTRAMUSCULAR; INTRAVENOUS; SUBCUTANEOUS ONCE
Qty: 0 | Refills: 0 | Status: DISCONTINUED | OUTPATIENT
Start: 2018-02-01 | End: 2018-02-23

## 2018-02-01 RX ORDER — LABETALOL HCL 100 MG
10 TABLET ORAL
Qty: 0 | Refills: 0 | Status: DISCONTINUED | OUTPATIENT
Start: 2018-02-01 | End: 2018-02-12

## 2018-02-01 RX ORDER — LABETALOL HCL 100 MG
10 TABLET ORAL ONCE
Qty: 0 | Refills: 0 | Status: COMPLETED | OUTPATIENT
Start: 2018-02-01 | End: 2018-02-01

## 2018-02-01 RX ORDER — SODIUM CHLORIDE 9 MG/ML
1000 INJECTION INTRAMUSCULAR; INTRAVENOUS; SUBCUTANEOUS
Qty: 0 | Refills: 0 | Status: DISCONTINUED | OUTPATIENT
Start: 2018-02-01 | End: 2018-02-04

## 2018-02-01 RX ORDER — HYDROMORPHONE HYDROCHLORIDE 2 MG/ML
0.5 INJECTION INTRAMUSCULAR; INTRAVENOUS; SUBCUTANEOUS ONCE
Qty: 0 | Refills: 0 | Status: DISCONTINUED | OUTPATIENT
Start: 2018-02-01 | End: 2018-02-01

## 2018-02-01 RX ORDER — HYDROMORPHONE HYDROCHLORIDE 2 MG/ML
0.5 INJECTION INTRAMUSCULAR; INTRAVENOUS; SUBCUTANEOUS ONCE
Qty: 0 | Refills: 0 | Status: DISCONTINUED | OUTPATIENT
Start: 2018-02-01 | End: 2018-02-02

## 2018-02-01 RX ADMIN — HYDROMORPHONE HYDROCHLORIDE 0.5 MILLIGRAM(S): 2 INJECTION INTRAMUSCULAR; INTRAVENOUS; SUBCUTANEOUS at 18:08

## 2018-02-01 RX ADMIN — Medication 0.25 MILLIGRAM(S): at 07:10

## 2018-02-01 RX ADMIN — HYDROMORPHONE HYDROCHLORIDE 0.5 MILLIGRAM(S): 2 INJECTION INTRAMUSCULAR; INTRAVENOUS; SUBCUTANEOUS at 22:00

## 2018-02-01 RX ADMIN — HYDROMORPHONE HYDROCHLORIDE 0.5 MILLIGRAM(S): 2 INJECTION INTRAMUSCULAR; INTRAVENOUS; SUBCUTANEOUS at 05:47

## 2018-02-01 RX ADMIN — Medication 10 MILLIGRAM(S): at 18:30

## 2018-02-01 RX ADMIN — HYDROMORPHONE HYDROCHLORIDE 0.5 MILLIGRAM(S): 2 INJECTION INTRAMUSCULAR; INTRAVENOUS; SUBCUTANEOUS at 00:29

## 2018-02-01 RX ADMIN — HYDROMORPHONE HYDROCHLORIDE 0.5 MILLIGRAM(S): 2 INJECTION INTRAMUSCULAR; INTRAVENOUS; SUBCUTANEOUS at 00:08

## 2018-02-01 RX ADMIN — HYDROMORPHONE HYDROCHLORIDE 0.5 MILLIGRAM(S): 2 INJECTION INTRAMUSCULAR; INTRAVENOUS; SUBCUTANEOUS at 18:42

## 2018-02-01 RX ADMIN — HYDROMORPHONE HYDROCHLORIDE 0.5 MILLIGRAM(S): 2 INJECTION INTRAMUSCULAR; INTRAVENOUS; SUBCUTANEOUS at 15:00

## 2018-02-01 RX ADMIN — HYDROMORPHONE HYDROCHLORIDE 0.5 MILLIGRAM(S): 2 INJECTION INTRAMUSCULAR; INTRAVENOUS; SUBCUTANEOUS at 09:20

## 2018-02-01 RX ADMIN — HYDROMORPHONE HYDROCHLORIDE 0.5 MILLIGRAM(S): 2 INJECTION INTRAMUSCULAR; INTRAVENOUS; SUBCUTANEOUS at 08:30

## 2018-02-01 RX ADMIN — PIPERACILLIN AND TAZOBACTAM 200 GRAM(S): 4; .5 INJECTION, POWDER, LYOPHILIZED, FOR SOLUTION INTRAVENOUS at 21:06

## 2018-02-01 RX ADMIN — PIPERACILLIN AND TAZOBACTAM 200 GRAM(S): 4; .5 INJECTION, POWDER, LYOPHILIZED, FOR SOLUTION INTRAVENOUS at 14:42

## 2018-02-01 RX ADMIN — HYDROMORPHONE HYDROCHLORIDE 0.5 MILLIGRAM(S): 2 INJECTION INTRAMUSCULAR; INTRAVENOUS; SUBCUTANEOUS at 06:03

## 2018-02-01 RX ADMIN — HYDROMORPHONE HYDROCHLORIDE 0.5 MILLIGRAM(S): 2 INJECTION INTRAMUSCULAR; INTRAVENOUS; SUBCUTANEOUS at 12:00

## 2018-02-01 RX ADMIN — SODIUM CHLORIDE 120 MILLILITER(S): 9 INJECTION, SOLUTION INTRAVENOUS at 00:08

## 2018-02-01 RX ADMIN — HEPARIN SODIUM 5000 UNIT(S): 5000 INJECTION INTRAVENOUS; SUBCUTANEOUS at 14:43

## 2018-02-01 RX ADMIN — ONDANSETRON 4 MILLIGRAM(S): 8 TABLET, FILM COATED ORAL at 02:25

## 2018-02-01 RX ADMIN — PIPERACILLIN AND TAZOBACTAM 200 GRAM(S): 4; .5 INJECTION, POWDER, LYOPHILIZED, FOR SOLUTION INTRAVENOUS at 08:30

## 2018-02-01 RX ADMIN — Medication 0.25 MILLIGRAM(S): at 21:07

## 2018-02-01 RX ADMIN — Medication 0.25 MILLIGRAM(S): at 16:30

## 2018-02-01 RX ADMIN — PIPERACILLIN AND TAZOBACTAM 200 GRAM(S): 4; .5 INJECTION, POWDER, LYOPHILIZED, FOR SOLUTION INTRAVENOUS at 02:25

## 2018-02-01 RX ADMIN — HYDROMORPHONE HYDROCHLORIDE 0.5 MILLIGRAM(S): 2 INJECTION INTRAMUSCULAR; INTRAVENOUS; SUBCUTANEOUS at 14:43

## 2018-02-01 RX ADMIN — HEPARIN SODIUM 5000 UNIT(S): 5000 INJECTION INTRAVENOUS; SUBCUTANEOUS at 21:07

## 2018-02-01 RX ADMIN — HEPARIN SODIUM 5000 UNIT(S): 5000 INJECTION INTRAVENOUS; SUBCUTANEOUS at 06:06

## 2018-02-01 RX ADMIN — HYDROMORPHONE HYDROCHLORIDE 0.5 MILLIGRAM(S): 2 INJECTION INTRAMUSCULAR; INTRAVENOUS; SUBCUTANEOUS at 11:30

## 2018-02-01 RX ADMIN — HYDROMORPHONE HYDROCHLORIDE 0.5 MILLIGRAM(S): 2 INJECTION INTRAMUSCULAR; INTRAVENOUS; SUBCUTANEOUS at 21:17

## 2018-02-01 RX ADMIN — ONDANSETRON 4 MILLIGRAM(S): 8 TABLET, FILM COATED ORAL at 14:42

## 2018-02-01 RX ADMIN — MICAFUNGIN SODIUM 105 MILLIGRAM(S): 100 INJECTION, POWDER, LYOPHILIZED, FOR SOLUTION INTRAVENOUS at 21:07

## 2018-02-01 RX ADMIN — Medication 0.25 MILLIGRAM(S): at 02:50

## 2018-02-01 RX ADMIN — HYDROMORPHONE HYDROCHLORIDE 0.5 MILLIGRAM(S): 2 INJECTION INTRAMUSCULAR; INTRAVENOUS; SUBCUTANEOUS at 04:00

## 2018-02-01 RX ADMIN — ONDANSETRON 4 MILLIGRAM(S): 8 TABLET, FILM COATED ORAL at 08:30

## 2018-02-01 RX ADMIN — HYDROMORPHONE HYDROCHLORIDE 0.5 MILLIGRAM(S): 2 INJECTION INTRAMUSCULAR; INTRAVENOUS; SUBCUTANEOUS at 03:42

## 2018-02-01 RX ADMIN — Medication 10 MILLIGRAM(S): at 22:00

## 2018-02-01 RX ADMIN — ONDANSETRON 4 MILLIGRAM(S): 8 TABLET, FILM COATED ORAL at 21:06

## 2018-02-01 NOTE — PROGRESS NOTE ADULT - SUBJECTIVE AND OBJECTIVE BOX
INTERVAL HPI/OVERNIGHT EVENTS:    Patient was seen and examined.  Complaining of a cough today. No abdominal pain or fevers.  No longer on pressors    CONSTITUTIONAL:  Negative fever or chills, feels well, good appetite  EYES:  Negative  blurry vision or double vision  CARDIOVASCULAR:  Negative for chest pain or palpitations  RESPIRATORY:  + cough, no wheezing or SOB   GASTROINTESTINAL:  Negative for nausea, vomiting, diarrhea, constipation, or abdominal pain  GENITOURINARY:  Negative frequency, urgency or dysuria  NEUROLOGIC:  No headache, confusion, dizziness, lightheadedness      ANTIBIOTICS/RELEVANT:    MEDICATIONS  (STANDING):  acetaminophen  IVPB. 1000 milliGRAM(s) IV Intermittent once  heparin  flush 100 Units/mL Injectable 100 Unit(s) IV Push every other day  heparin  Injectable 5000 Unit(s) SubCutaneous every 8 hours  insulin lispro (HumaLOG) corrective regimen sliding scale   SubCutaneous Before meals and at bedtime  micafungin IVPB 100 milliGRAM(s) IV Intermittent every 24 hours  piperacillin/tazobactam IVPB. 2.25 Gram(s) IV Intermittent every 6 hours  sodium chloride 0.45% 1000 milliLiter(s) (120 mL/Hr) IV Continuous <Continuous>    MEDICATIONS  (PRN):  HYDROmorphone  Injectable 0.5 milliGRAM(s) IV Push every 3 hours PRN Moderate Pain (4 - 6)  LORazepam   Injectable 0.25 milliGRAM(s) IV Push every 4 hours PRN Anxiety  ondansetron Injectable 4 milliGRAM(s) IV Push every 6 hours PRN Nausea and/or Vomiting        Vital Signs Last 24 Hrs  T(C): 37.1 (2018 08:56), Max: 37.1 (2018 05:00)  T(F): 98.7 (2018 08:56), Max: 98.7 (2018 05:00)  HR: 86 (2018 10:00) (84 - 102)  BP: 135/83 (2018 10:00) (95/66 - 144/75)  BP(mean): 96 (2018 10:00) (77 - 98)  RR: 14 (2018 10:00) (12 - 28)  SpO2: 98% (2018 10:00) (93% - 100%)    PHYSICAL EXAM:  Constitutional: obese female, no acute distress  Eyes:  no icterus   Ear/Nose/Throat: no oral lesion, no sinus tenderness on percussion	  Neck:no JVD, no lymphadenopathy, supple  Respiratory: moving air well bilaterally  Cardiovascular: S1S2 RRR, no murmurs  Gastrointestinal:soft, small wound vac in place with 2 drains, no erythema, non-tender  Extremities:no edema  Vascular: DP Pulse:	right normal; left normal      LABS:                        7.7    10.7  )-----------( 206      ( 2018 08:50 )             23.4     02-01    133<L>  |  98  |  30<H>  ----------------------------<  92  4.9   |  23  |  1.85<H>    Ca    8.6      2018 05:21  Phos  4.7     02-  Mg     2.4     02-    TPro  6.0  /  Alb  2.6<L>  /  TBili  0.9  /  DBili  x   /  AST  25  /  ALT  26  /  AlkPhos  142<H>  02-      Urinalysis Basic - ( 2018 10:55 )    Color: Yellow / Appearance: Clear / S.010 / pH: x  Gluc: x / Ketone: NEGATIVE  / Bili: Negative / Urobili: 0.2 E.U./dL   Blood: x / Protein: NEGATIVE mg/dL / Nitrite: NEGATIVE   Leuk Esterase: NEGATIVE / RBC: x / WBC x   Sq Epi: x / Non Sq Epi: x / Bacteria: x        MICROBIOLOGY:    RADIOLOGY & ADDITIONAL STUDIES:

## 2018-02-01 NOTE — PROGRESS NOTE ADULT - SUBJECTIVE AND OBJECTIVE BOX
24 hr events:  O/N: L subclavian line pulled, LA down to 2.7, off levophed, vanc level 29.5. FSBG 74; given half amp.   : AM Hg 7.4, repeat 7.6, transfused 2 units pRBC. lactate and levo downtrending. Cr=1.6, vanc discontinued. Repeat Hgb 10 then 9.7. OOB to chair. Started SQH (as per Caitlyn)    SUBJECTIVE:  pain controlled o/n, denies HA, N/V, denies CP/SOB, denies abd pain, no flatus or BM, no fevers/chills/sweats     MEDICATIONS  (STANDING):  acetaminophen  IVPB. 1000 milliGRAM(s) IV Intermittent once  heparin  flush 100 Units/mL Injectable 100 Unit(s) IV Push every other day  heparin  Injectable 5000 Unit(s) SubCutaneous every 8 hours  HYDROmorphone  Injectable 0.5 milliGRAM(s) IV Push once  insulin lispro (HumaLOG) corrective regimen sliding scale   SubCutaneous Before meals and at bedtime  micafungin IVPB 100 milliGRAM(s) IV Intermittent every 24 hours  piperacillin/tazobactam IVPB. 2.25 Gram(s) IV Intermittent every 6 hours  sodium chloride 0.9%. 1000 milliLiter(s) (100 mL/Hr) IV Continuous <Continuous>  sodium chloride 3%  Inhalation 4 milliLiter(s) Inhalation once    MEDICATIONS  (PRN):  HYDROmorphone  Injectable 0.5 milliGRAM(s) IV Push every 3 hours PRN Moderate Pain (4 - 6)  LORazepam   Injectable 0.25 milliGRAM(s) IV Push every 4 hours PRN Anxiety  ondansetron Injectable 4 milliGRAM(s) IV Push every 6 hours PRN Nausea and/or Vomiting    ICU Vital Signs Last 24 Hrs  T(C): 36.7 (2018 15:13), Max: 37.1 (2018 05:00)  T(F): 98.1 (2018 15:13), Max: 98.7 (2018 05:00)  HR: 82 (2018 14:00) (82 - 92)  BP: 117/70 (2018 14:00) (103/61 - 144/75)  BP(mean): 84 (2018 14:00) (76 - 99)  ABP: 121/70 (2018 20:00) (96/80 - 126/80)  ABP(mean): 91 (2018 20:00) (83 - 96)  RR: 13 (2018 14:00) (12 - 28)  SpO2: 98% (2018 14:00) (93% - 100%)      Physical Exam:  General: NAD  HEENT: NC/AT, EOMI, PERRLA, normal hearing, no oral lesions, neck supple w/o LAD  Pulmonary: Nonlabored breathing, no respiratory distress, CTA-B, 2L NC  Cardiovascular: NSR, no murmurs  Abdominal: soft, NT/ND, -BS, no organomegaly, wound vac found not to suction, blood collecting on lateral aspect of dressing under tegaderm; JPx2 serosang  Extremities: WWP, 5/5 strength x 4, no clubbing/cyanosis/edema  Neuro: A/O x3, CNs II-XII grossly intact, normal motor/sensation, no focal deficits  Pulses: palpable distal pulses      I&O's Summary    2018 07:  -  2018 07:00  --------------------------------------------------------  IN: 3716.7 mL / OUT: 1300 mL / NET: 2416.7 mL    2018 07:  -  2018 15:56  --------------------------------------------------------  IN: 1100 mL / OUT: 1045 mL / NET: 55 mL        LABS:                        7.4    9.9   )-----------( 189      ( 2018 14:22 )             22.0     02-    133<L>  |  98  |  30<H>  ----------------------------<  92  4.9   |  23  |  1.85<H>    Ca    8.6      2018 05:21  Phos  4.7     02-  Mg     2.4     -    TPro  6.0  /  Alb  2.6<L>  /  TBili  0.9  /  DBili  x   /  AST  25  /  ALT  26  /  AlkPhos  142<H>        Urinalysis Basic - ( 2018 10:55 )    Color: Yellow / Appearance: Clear / S.010 / pH: x  Gluc: x / Ketone: NEGATIVE  / Bili: Negative / Urobili: 0.2 E.U./dL   Blood: x / Protein: NEGATIVE mg/dL / Nitrite: NEGATIVE   Leuk Esterase: NEGATIVE / RBC: < 5 /HPF / WBC < 5 /HPF   Sq Epi: x / Non Sq Epi: 0-5 /HPF / Bacteria: Present /HPF      CAPILLARY BLOOD GLUCOSE      POCT Blood Glucose.: 82 mg/dL (2018 11:30)  POCT Blood Glucose.: 83 mg/dL (2018 05:59)  POCT Blood Glucose.: 111 mg/dL (2018 23:18)  POCT Blood Glucose.: 74 mg/dL (2018 22:29)    LIVER FUNCTIONS - ( 2018 05:21 )  Alb: 2.6 g/dL / Pro: 6.0 g/dL / ALK PHOS: 142 U/L / ALT: 26 U/L / AST: 25 U/L / GGT: x             Cultures:    RADIOLOGY & ADDITIONAL STUDIES: 24 hr events:  O/N: L subclavian line pulled, LA down to 2.7, off levophed, vanc level 29.5. FSBG 74; given half amp.   : AM Hg 7.4, repeat 7.6, transfused 2 units pRBC. lactate and levo downtrending. Cr=1.6, vanc discontinued. Repeat Hgb 10 then 9.7. OOB to chair. Started SQH (as per Caitlyn)    SUBJECTIVE:  pain controlled o/n, denies HA, N/V, denies CP/SOB, denies abd pain, no flatus or BM, no fevers/chills/sweats     MEDICATIONS  (STANDING):  acetaminophen  IVPB. 1000 milliGRAM(s) IV Intermittent once  heparin  flush 100 Units/mL Injectable 100 Unit(s) IV Push every other day  heparin  Injectable 5000 Unit(s) SubCutaneous every 8 hours  HYDROmorphone  Injectable 0.5 milliGRAM(s) IV Push once  insulin lispro (HumaLOG) corrective regimen sliding scale   SubCutaneous Before meals and at bedtime  micafungin IVPB 100 milliGRAM(s) IV Intermittent every 24 hours  piperacillin/tazobactam IVPB. 2.25 Gram(s) IV Intermittent every 6 hours  sodium chloride 0.9%. 1000 milliLiter(s) (100 mL/Hr) IV Continuous <Continuous>  sodium chloride 3%  Inhalation 4 milliLiter(s) Inhalation once    MEDICATIONS  (PRN):  HYDROmorphone  Injectable 0.5 milliGRAM(s) IV Push every 3 hours PRN Moderate Pain (4 - 6)  LORazepam   Injectable 0.25 milliGRAM(s) IV Push every 4 hours PRN Anxiety  ondansetron Injectable 4 milliGRAM(s) IV Push every 6 hours PRN Nausea and/or Vomiting    ICU Vital Signs Last 24 Hrs  T(C): 36.7 (2018 15:13), Max: 37.1 (2018 05:00)  T(F): 98.1 (2018 15:13), Max: 98.7 (2018 05:00)  HR: 82 (2018 14:00) (82 - 92)  BP: 117/70 (2018 14:00) (103/61 - 144/75)  BP(mean): 84 (2018 14:00) (76 - 99)  ABP: 121/70 (2018 20:00) (96/80 - 126/80)  ABP(mean): 91 (2018 20:00) (83 - 96)  RR: 13 (2018 14:00) (12 - 28)  SpO2: 98% (2018 14:00) (93% - 100%)      Physical Exam:  General: NAD  HEENT: NC/AT, EOMI, PERRLA, normal hearing, no oral lesions, neck supple w/o LAD  Pulmonary: Nonlabored breathing, no respiratory distress, CTA-B, 2L NC  Cardiovascular: NSR, no murmurs  Abdominal: soft, NT/ND, -BS, no organomegaly, wound vac found not to suction, blood collecting on lateral aspect of dressing under tegaderm; JPx2 serosang  Extremities: WWP, 5/5 strength x 4, no clubbing/cyanosis/edema  Neuro: A/O x3, CNs II-XII grossly intact, normal motor/sensation, no focal deficits  Pulses: palpable distal pulses      I&O's Summary    2018 07:  -  2018 07:00  --------------------------------------------------------  IN: 3716.7 mL / OUT: 1300 mL / NET: 2416.7 mL    2018 07:  -  2018 15:56  --------------------------------------------------------  IN: 1100 mL / OUT: 1045 mL / NET: 55 mL        LABS:                        7.4    9.9   )-----------( 189      ( 2018 14:22 )             22.0     02-    133<L>  |  98  |  30<H>  ----------------------------<  92  4.9   |  23  |  1.85<H>    Ca    8.6      2018 05:21  Phos  4.7     02-  Mg     2.4     02-    TPro  6.0  /  Alb  2.6<L>  /  TBili  0.9  /  DBili  x   /  AST  25  /  ALT  26  /  AlkPhos  142<H>        Urinalysis Basic - ( 2018 10:55 )    Color: Yellow / Appearance: Clear / S.010 / pH: x  Gluc: x / Ketone: NEGATIVE  / Bili: Negative / Urobili: 0.2 E.U./dL   Blood: x / Protein: NEGATIVE mg/dL / Nitrite: NEGATIVE   Leuk Esterase: NEGATIVE / RBC: < 5 /HPF / WBC < 5 /HPF   Sq Epi: x / Non Sq Epi: 0-5 /HPF / Bacteria: Present /HPF      CAPILLARY BLOOD GLUCOSE      POCT Blood Glucose.: 82 mg/dL (2018 11:30)  POCT Blood Glucose.: 83 mg/dL (2018 05:59)  POCT Blood Glucose.: 111 mg/dL (2018 23:18)  POCT Blood Glucose.: 74 mg/dL (2018 22:29)    LIVER FUNCTIONS - ( 2018 05:21 )  Alb: 2.6 g/dL / Pro: 6.0 g/dL / ALK PHOS: 142 U/L / ALT: 26 U/L / AST: 25 U/L / GGT: x 24 hr events:  O/N: L subclavian line pulled, LA down to 2.7, off levophed, vanc level 29.5. FSBG 74; given half amp.   : AM Hg 7.4, repeat 7.6, transfused 2 units pRBC. lactate and levo downtrending. Cr=1.6, vanc discontinued. Repeat Hgb 10 then 9.7. OOB to chair. Started SQH (as per Caitlyn)    SUBJECTIVE:  pain controlled o/n, denies HA, N/V, denies CP/SOB, denies abd pain, no flatus or BM, no fevers/chills/sweats     MEDICATIONS  (STANDING):  acetaminophen  IVPB. 1000 milliGRAM(s) IV Intermittent once  heparin  flush 100 Units/mL Injectable 100 Unit(s) IV Push every other day  heparin  Injectable 5000 Unit(s) SubCutaneous every 8 hours  HYDROmorphone  Injectable 0.5 milliGRAM(s) IV Push once  insulin lispro (HumaLOG) corrective regimen sliding scale   SubCutaneous Before meals and at bedtime  micafungin IVPB 100 milliGRAM(s) IV Intermittent every 24 hours  piperacillin/tazobactam IVPB. 2.25 Gram(s) IV Intermittent every 6 hours  sodium chloride 0.9%. 1000 milliLiter(s) (100 mL/Hr) IV Continuous <Continuous>  sodium chloride 3%  Inhalation 4 milliLiter(s) Inhalation once    MEDICATIONS  (PRN):  HYDROmorphone  Injectable 0.5 milliGRAM(s) IV Push every 3 hours PRN Moderate Pain (4 - 6)  LORazepam   Injectable 0.25 milliGRAM(s) IV Push every 4 hours PRN Anxiety  ondansetron Injectable 4 milliGRAM(s) IV Push every 6 hours PRN Nausea and/or Vomiting    ICU Vital Signs Last 24 Hrs  T(C): 36.7 (2018 15:13), Max: 37.1 (2018 05:00)  T(F): 98.1 (2018 15:13), Max: 98.7 (2018 05:00)  HR: 82 (2018 14:00) (82 - 92)  BP: 117/70 (2018 14:00) (103/61 - 144/75)  BP(mean): 84 (2018 14:00) (76 - 99)  ABP: 121/70 (2018 20:00) (96/80 - 126/80)  ABP(mean): 91 (2018 20:00) (83 - 96)  RR: 13 (2018 14:00) (12 - 28)  SpO2: 98% (2018 14:00) (93% - 100%)      Physical Exam:  General: NAD  HEENT: NC/AT, EOMI, PERRLA, normal hearing, neck supple w/o LAD  Pulmonary: Nonlabored breathing, no respiratory distress, CTA-B, 2L NC  Cardiovascular: NSR, no murmurs  Abdominal: soft, NT/ND, -BS, no organomegaly, wound vac found not to suction, blood collecting on lateral aspect of dressing under tegaderm; JPx2 serosang  Extremities: WWP, 5/5 strength x 4  Neuro: A/O x3, normal motor/sensation, no focal deficits      I&O's Summary    2018 07:  -  2018 07:00  --------------------------------------------------------  IN: 3716.7 mL / OUT: 1300 mL / NET: 2416.7 mL    2018 07:01  -  2018 15:56  --------------------------------------------------------  IN: 1100 mL / OUT: 1045 mL / NET: 55 mL        LABS:                        7.4    9.9   )-----------( 189      ( 2018 14:22 )             22.0     02-01    133<L>  |  98  |  30<H>  ----------------------------<  92  4.9   |  23  |  1.85<H>    Ca    8.6      2018 05:21  Phos  4.7     02-01  Mg     2.4         TPro  6.0  /  Alb  2.6<L>  /  TBili  0.9  /  DBili  x   /  AST  25  /  ALT  26  /  AlkPhos  142<H>        Urinalysis Basic - ( 2018 10:55 )    Color: Yellow / Appearance: Clear / S.010 / pH: x  Gluc: x / Ketone: NEGATIVE  / Bili: Negative / Urobili: 0.2 E.U./dL   Blood: x / Protein: NEGATIVE mg/dL / Nitrite: NEGATIVE   Leuk Esterase: NEGATIVE / RBC: < 5 /HPF / WBC < 5 /HPF   Sq Epi: x / Non Sq Epi: 0-5 /HPF / Bacteria: Present /HPF      CAPILLARY BLOOD GLUCOSE      POCT Blood Glucose.: 82 mg/dL (2018 11:30)  POCT Blood Glucose.: 83 mg/dL (2018 05:59)  POCT Blood Glucose.: 111 mg/dL (2018 23:18)  POCT Blood Glucose.: 74 mg/dL (2018 22:29)    LIVER FUNCTIONS - ( 2018 05:21 )  Alb: 2.6 g/dL / Pro: 6.0 g/dL / ALK PHOS: 142 U/L / ALT: 26 U/L / AST: 25 U/L / GGT: x

## 2018-02-01 NOTE — PROGRESS NOTE ADULT - ASSESSMENT
IMPRESSION:  Septic shock secondary to presumed abdominal source.  Unclear etiology as no blood cultures available.  She has clinically improved with sepsis resolving    Recommend:  1.  Can hold vancomycin given ASA  2.  Continue Zosyn and micafungin at current doses.  Will likely need empiric 2 week course    Will follow

## 2018-02-01 NOTE — PROGRESS NOTE ADULT - SUBJECTIVE AND OBJECTIVE BOX
PAIN MANAGEMENT PROGRESS NOTE    OVERNIGHT EVENTS:  - No acute overnight events. Still NPO.   - Pt still c/o acute generalized abdominal pain, no changes in intensity as compared to yesterday.     PLAN/RECOMMENDATIONS:  - Will continue to follow pt closely. For now, safest to keep on Dilaudid dosing given ATN, Creatinine, Serum: 1.85 mg/dL (02.01.18 @ 05:21). Dilaudid, renal metabolism, less likely to cause AE/SE as compared to other opiates.   - Based on prior recommendations, would not recommend dc with IR opiates in long term given hx of abuse/poor impulse control.  - Morphabond ER BID-TID when transition to PO regimen, while in hospital--not on formulary MS Contin 15mg BID-TID. Will decide based on pain level and creatinine function. Recommend close outpatient follow up for down-titration. Will recommend starting titration with improvement of renal function and advancement of diet.     CALL NP: [phone] for any acute changes in pain throughout day, significant difficulties with activities, or side effects/adverse effects with pain medications  CALL Dr Tony Julio for weekend call: [phone]    ASSESSMENT:   56F w/PMHx of HTN, gastric bypass in 2002 c/b stricture, corkscrewed sleeve and chronic leak, revised on 11/28/16 with protracted (70-day) postoperative course complicated by MDR infections, a C-diff infection, respiratory compromise due to plugging/PNA/effusions requiring multiple interventions and a trach, as well as a continued leak requiring a draining double-pigtail with stenting performed by GI. Pt had longstanding enterocutaneous fistula which she presented during this hospitalization for resection of. Now post op Ex-lap and SBR. ID following, pt in septic shock and on vasopressors. Pt has hx of questionable opioid dependence/abuse and difficult course with pain control following prior operations. Multiple documented reports since last hospitalization of maladaptive behaviors with dc of Dilaudid IVP and reports of suicidal ideation with dc. Per corroboration from current team, pt has had multiple requests for increase of dosage, addition of IV Benadryl. Pt currently is complaining of vicelike pain primarily in epigastrum with generalized abdominal tenderness which is primarily brought on with transfer from bed to chair and movement in bed. Otherwise, reports hx of chronic low back pain, but denies imaging, would corroborate.      REVIEW OF SYSTEMS:  CONSTITUTIONAL: No fever or fatigue O/N.   NECK/ENT: No pain or stiffness. No throat pain  RESPIRATORY: No cough, wheezing; No shortness of breath  CARDIOVASCULAR: No chest pain, palpitations.   GASTROINTESTINAL: No BM since preop. Reporting primary epigastric pain. No nausea, vomiting.   NEUROLOGICAL: No headaches, no loss of strength, no numbness, or tremors. No dizziness or lightheadedness with pain medications.     FUNCTIONAL ASSESSMENT:  PAIN SCORE AT REST:   10/10      SCALE USED: (1-10 VNRS)  PAIN SCORE WITH ACTIVITY:    10/10     SCALE USED: (1-10 VNRS)    PAIN ASSESSMENT:  see pain hpi above.    PHYSICAL EXAM  GENERAL: Obese female sitting up in chair.   NECK: Supple  NERVOUS SYSTEM:    Alert & Oriented X3, Good concentration;   Cranial nerves grossly intact  Motor exam:  KELLEY x 4, 5/5 in 4 extr.          [X] warm well perfused; capillary refill <3 seconds   Sensation intact to LT in UE/LE in 3 dermatomes  ABDOMEN: TTP (deep), only mild with LT x 4 quad, distended--baseline, compressible, hypoactive BS.     ASSESSMENT:   57 F s/p  esophagojejunostomy revision c/b EC fistula now s/p medical optimization of EC fistula and return to OR for EC fistula take down and subsequent revision of her aamir-en-y (1/30) c/o generalized abdominal pain    RECS:   1. Opioids  Since yesterday post op, pt has required frequent PRN Dosing Dilaudid  - For now, would not recommend escalation of current dosing.   - Will continue to follow pt closely. For now, safest to keep on Dilaudid dosing given ATN, Creatinine, Serum: 1.85 mg/dL (02.01.18 @ 05:21). Dilaudid, renal metabolism, less likely to cause AE/SE as compared to other opiates.   - Based on prior observations, would not recommend dc with IR opiates in long term given hx of abuse/poor impulse control.  - Morphabond ER BID-TID when transition to PO regimen, while in hospital--not on formulary MS Contin 15mg BID-TID. Will decide based on pain level and creatinine function. Recommend close outpatient follow up for down-titration. Will recommend starting titration with improvement of renal function and advancement of diet.     2. Neuropathics  Pt currently denies neuropathic pain. No hx of radiculopathy, neuropathy, or s/sx of.  - No indication for neuropathic agents. Gabapentin for anxiolytic benefit, may provide some opioid sparing effect, not very significant.     3. Adjuvants  Pt not complaining of abdominal cramping, hx of apparent low back pain r/t DDD.   - Continue to recommend frequent IVT q8h for opioid sparing effect, somatic/abdominal pain best addressed    Aspartate Aminotransferase (AST/SGOT): 25 U/L (02.01.18 @ 05:21)  Alanine Aminotransferase (ALT/SGPT): 26 U/L (02.01.18 @ 05:21)    4. Prophylactic:   Bowel regimen: Docusate Senna;    Nausea PRN: Zofran PRN for Nausea, pt does not c/o current    5. Functional Goals:   Pt will get OOB with PT today. Pt will resume previous level of activity without impairment from surgery.     6. Additional Consults:   None recommended.     7. Additional Labs/Imaging:   None recommended.     8. Follow up, Discharge Planning:   Patient is set for discharge to: Pending ptot eval, med plan--currently still in ICU, acute phase of workup  Discharge is pending: Pending ptot eval  Pain Management follow up plan: C/t follow inpatient.

## 2018-02-01 NOTE — PROGRESS NOTE ADULT - ASSESSMENT
57 F s/p  esophagojejunostomy revision c/b EC fistula now s/p medical optimization of EC fistula and return to OR for EC fistula take down and subsequent revision of her aamir-en-y (1/30)    Neuro: Dilaudid prn, acetaminophen PRN, ativan prn, zofran prn  CV: MAP goal 65  Pulm: NC  FENGI: NPO. IVF, PPI, NS@100  : Aimee  Endo: ISS  ID: zosyn (1/30-2/4), melany (1/30-2/4), Vanc (1/30-1/31) holding 2/2 ASA now checking by level  Ppx: SCDs, SQH  Lines: PICC line to be exchanged, R radial [place]  Wounds: midline wound with prevena Vac. SubQ CHECO x 2 57 F s/p  esophagojejunostomy revision c/b EC fistula now s/p medical optimization of EC fistula and return to OR for EC fistula take down and subsequent revision of her aamir-en-y (1/30)    Neuro: Dilaudid prn, acetaminophen PRN, ativan prn, zofran prn  CV: MAP goal 65  Pulm: NC  FENGI: NPO. IVF, PPI, NS@100, await flatus  : Aimee  Endo: ISS  ID: zosyn (1/30-2/4), melany (1/30-2/4), Vanc (1/30-1/31) holding 2/2 ASA now checking by level  Ppx: SCDs, SQH  Lines: PICC line to be exchanged, R radial [place]  Wounds: midline wound with prevena Vac. SubQ CHECO x 2

## 2018-02-02 LAB
ANION GAP SERPL CALC-SCNC: 10 MMOL/L — SIGNIFICANT CHANGE UP (ref 5–17)
APTT BLD: 39.3 SEC — HIGH (ref 27.5–37.4)
BUN SERPL-MCNC: 25 MG/DL — HIGH (ref 7–23)
CALCIUM SERPL-MCNC: 8.4 MG/DL — SIGNIFICANT CHANGE UP (ref 8.4–10.5)
CHLORIDE SERPL-SCNC: 100 MMOL/L — SIGNIFICANT CHANGE UP (ref 96–108)
CO2 SERPL-SCNC: 25 MMOL/L — SIGNIFICANT CHANGE UP (ref 22–31)
CREAT SERPL-MCNC: 1.53 MG/DL — HIGH (ref 0.5–1.3)
GLUCOSE BLDC GLUCOMTR-MCNC: 75 MG/DL — SIGNIFICANT CHANGE UP (ref 70–99)
GLUCOSE BLDC GLUCOMTR-MCNC: 78 MG/DL — SIGNIFICANT CHANGE UP (ref 70–99)
GLUCOSE BLDC GLUCOMTR-MCNC: 81 MG/DL — SIGNIFICANT CHANGE UP (ref 70–99)
GLUCOSE BLDC GLUCOMTR-MCNC: 94 MG/DL — SIGNIFICANT CHANGE UP (ref 70–99)
GLUCOSE SERPL-MCNC: 100 MG/DL — HIGH (ref 70–99)
HCT VFR BLD CALC: 21.1 % — LOW (ref 34.5–45)
HCT VFR BLD CALC: 22.2 % — LOW (ref 34.5–45)
HGB BLD-MCNC: 6.9 G/DL — CRITICAL LOW (ref 11.5–15.5)
HGB BLD-MCNC: 7.4 G/DL — LOW (ref 11.5–15.5)
INR BLD: 1.27 — HIGH (ref 0.88–1.16)
MAGNESIUM SERPL-MCNC: 2.1 MG/DL — SIGNIFICANT CHANGE UP (ref 1.6–2.6)
MCHC RBC-ENTMCNC: 27.1 PG — SIGNIFICANT CHANGE UP (ref 27–34)
MCHC RBC-ENTMCNC: 27.8 PG — SIGNIFICANT CHANGE UP (ref 27–34)
MCHC RBC-ENTMCNC: 32.7 G/DL — SIGNIFICANT CHANGE UP (ref 32–36)
MCHC RBC-ENTMCNC: 33.3 G/DL — SIGNIFICANT CHANGE UP (ref 32–36)
MCV RBC AUTO: 82.7 FL — SIGNIFICANT CHANGE UP (ref 80–100)
MCV RBC AUTO: 83.5 FL — SIGNIFICANT CHANGE UP (ref 80–100)
PHOSPHATE SERPL-MCNC: 3.5 MG/DL — SIGNIFICANT CHANGE UP (ref 2.5–4.5)
PLATELET # BLD AUTO: 188 K/UL — SIGNIFICANT CHANGE UP (ref 150–400)
PLATELET # BLD AUTO: 241 K/UL — SIGNIFICANT CHANGE UP (ref 150–400)
POTASSIUM SERPL-MCNC: 4.2 MMOL/L — SIGNIFICANT CHANGE UP (ref 3.5–5.3)
POTASSIUM SERPL-SCNC: 4.2 MMOL/L — SIGNIFICANT CHANGE UP (ref 3.5–5.3)
PROTHROM AB SERPL-ACNC: 14.2 SEC — HIGH (ref 9.8–12.7)
RBC # BLD: 2.55 M/UL — LOW (ref 3.8–5.2)
RBC # BLD: 2.66 M/UL — LOW (ref 3.8–5.2)
RBC # FLD: 16 % — SIGNIFICANT CHANGE UP (ref 10.3–16.9)
RBC # FLD: 16.5 % — SIGNIFICANT CHANGE UP (ref 10.3–16.9)
SODIUM SERPL-SCNC: 135 MMOL/L — SIGNIFICANT CHANGE UP (ref 135–145)
WBC # BLD: 10.1 K/UL — SIGNIFICANT CHANGE UP (ref 3.8–10.5)
WBC # BLD: 9.4 K/UL — SIGNIFICANT CHANGE UP (ref 3.8–10.5)
WBC # FLD AUTO: 10.1 K/UL — SIGNIFICANT CHANGE UP (ref 3.8–10.5)
WBC # FLD AUTO: 9.4 K/UL — SIGNIFICANT CHANGE UP (ref 3.8–10.5)

## 2018-02-02 PROCEDURE — 99233 SBSQ HOSP IP/OBS HIGH 50: CPT | Mod: GC

## 2018-02-02 PROCEDURE — 99232 SBSQ HOSP IP/OBS MODERATE 35: CPT

## 2018-02-02 RX ORDER — HYDROMORPHONE HYDROCHLORIDE 2 MG/ML
0.5 INJECTION INTRAMUSCULAR; INTRAVENOUS; SUBCUTANEOUS ONCE
Qty: 0 | Refills: 0 | Status: DISCONTINUED | OUTPATIENT
Start: 2018-02-02 | End: 2018-02-02

## 2018-02-02 RX ORDER — ESCITALOPRAM OXALATE 10 MG/1
20 TABLET, FILM COATED ORAL DAILY
Qty: 0 | Refills: 0 | Status: DISCONTINUED | OUTPATIENT
Start: 2018-02-02 | End: 2018-02-06

## 2018-02-02 RX ORDER — LABETALOL HCL 100 MG
20 TABLET ORAL ONCE
Qty: 0 | Refills: 0 | Status: COMPLETED | OUTPATIENT
Start: 2018-02-02 | End: 2018-02-02

## 2018-02-02 RX ORDER — DIAZEPAM 5 MG
5 TABLET ORAL AT BEDTIME
Qty: 0 | Refills: 0 | Status: DISCONTINUED | OUTPATIENT
Start: 2018-02-02 | End: 2018-02-05

## 2018-02-02 RX ORDER — HYDROMORPHONE HYDROCHLORIDE 2 MG/ML
0.25 INJECTION INTRAMUSCULAR; INTRAVENOUS; SUBCUTANEOUS EVERY 4 HOURS
Qty: 0 | Refills: 0 | Status: DISCONTINUED | OUTPATIENT
Start: 2018-02-02 | End: 2018-02-02

## 2018-02-02 RX ORDER — HYDROMORPHONE HYDROCHLORIDE 2 MG/ML
0.5 INJECTION INTRAMUSCULAR; INTRAVENOUS; SUBCUTANEOUS EVERY 4 HOURS
Qty: 0 | Refills: 0 | Status: DISCONTINUED | OUTPATIENT
Start: 2018-02-02 | End: 2018-02-02

## 2018-02-02 RX ADMIN — PIPERACILLIN AND TAZOBACTAM 200 GRAM(S): 4; .5 INJECTION, POWDER, LYOPHILIZED, FOR SOLUTION INTRAVENOUS at 10:07

## 2018-02-02 RX ADMIN — PIPERACILLIN AND TAZOBACTAM 200 GRAM(S): 4; .5 INJECTION, POWDER, LYOPHILIZED, FOR SOLUTION INTRAVENOUS at 14:00

## 2018-02-02 RX ADMIN — Medication 5 MILLIGRAM(S): at 22:41

## 2018-02-02 RX ADMIN — HYDROMORPHONE HYDROCHLORIDE 0.25 MILLIGRAM(S): 2 INJECTION INTRAMUSCULAR; INTRAVENOUS; SUBCUTANEOUS at 18:06

## 2018-02-02 RX ADMIN — Medication 10 MILLIGRAM(S): at 11:05

## 2018-02-02 RX ADMIN — HYDROMORPHONE HYDROCHLORIDE 0.25 MILLIGRAM(S): 2 INJECTION INTRAMUSCULAR; INTRAVENOUS; SUBCUTANEOUS at 13:37

## 2018-02-02 RX ADMIN — HYDROMORPHONE HYDROCHLORIDE 0.25 MILLIGRAM(S): 2 INJECTION INTRAMUSCULAR; INTRAVENOUS; SUBCUTANEOUS at 13:17

## 2018-02-02 RX ADMIN — HYDROMORPHONE HYDROCHLORIDE 0.5 MILLIGRAM(S): 2 INJECTION INTRAMUSCULAR; INTRAVENOUS; SUBCUTANEOUS at 07:00

## 2018-02-02 RX ADMIN — HEPARIN SODIUM 5000 UNIT(S): 5000 INJECTION INTRAVENOUS; SUBCUTANEOUS at 13:17

## 2018-02-02 RX ADMIN — HYDROMORPHONE HYDROCHLORIDE 0.5 MILLIGRAM(S): 2 INJECTION INTRAMUSCULAR; INTRAVENOUS; SUBCUTANEOUS at 00:47

## 2018-02-02 RX ADMIN — Medication 0.25 MILLIGRAM(S): at 03:00

## 2018-02-02 RX ADMIN — PIPERACILLIN AND TAZOBACTAM 200 GRAM(S): 4; .5 INJECTION, POWDER, LYOPHILIZED, FOR SOLUTION INTRAVENOUS at 03:34

## 2018-02-02 RX ADMIN — HYDROMORPHONE HYDROCHLORIDE 0.5 MILLIGRAM(S): 2 INJECTION INTRAMUSCULAR; INTRAVENOUS; SUBCUTANEOUS at 01:00

## 2018-02-02 RX ADMIN — ESCITALOPRAM OXALATE 20 MILLIGRAM(S): 10 TABLET, FILM COATED ORAL at 12:10

## 2018-02-02 RX ADMIN — HYDROMORPHONE HYDROCHLORIDE 0.5 MILLIGRAM(S): 2 INJECTION INTRAMUSCULAR; INTRAVENOUS; SUBCUTANEOUS at 07:30

## 2018-02-02 RX ADMIN — Medication 100 UNIT(S): at 12:10

## 2018-02-02 RX ADMIN — HYDROMORPHONE HYDROCHLORIDE 0.5 MILLIGRAM(S): 2 INJECTION INTRAMUSCULAR; INTRAVENOUS; SUBCUTANEOUS at 04:40

## 2018-02-02 RX ADMIN — Medication 10 MILLIGRAM(S): at 05:37

## 2018-02-02 RX ADMIN — Medication 20 MILLIGRAM(S): at 03:00

## 2018-02-02 RX ADMIN — HYDROMORPHONE HYDROCHLORIDE 0.25 MILLIGRAM(S): 2 INJECTION INTRAMUSCULAR; INTRAVENOUS; SUBCUTANEOUS at 18:00

## 2018-02-02 RX ADMIN — HYDROMORPHONE HYDROCHLORIDE 0.5 MILLIGRAM(S): 2 INJECTION INTRAMUSCULAR; INTRAVENOUS; SUBCUTANEOUS at 04:56

## 2018-02-02 RX ADMIN — ONDANSETRON 4 MILLIGRAM(S): 8 TABLET, FILM COATED ORAL at 18:00

## 2018-02-02 RX ADMIN — MICAFUNGIN SODIUM 105 MILLIGRAM(S): 100 INJECTION, POWDER, LYOPHILIZED, FOR SOLUTION INTRAVENOUS at 22:41

## 2018-02-02 RX ADMIN — HEPARIN SODIUM 5000 UNIT(S): 5000 INJECTION INTRAVENOUS; SUBCUTANEOUS at 06:00

## 2018-02-02 RX ADMIN — HEPARIN SODIUM 5000 UNIT(S): 5000 INJECTION INTRAVENOUS; SUBCUTANEOUS at 22:41

## 2018-02-02 RX ADMIN — PIPERACILLIN AND TAZOBACTAM 200 GRAM(S): 4; .5 INJECTION, POWDER, LYOPHILIZED, FOR SOLUTION INTRAVENOUS at 21:29

## 2018-02-02 NOTE — PROGRESS NOTE ADULT - SUBJECTIVE AND OBJECTIVE BOX
s/p reconstruction  medically doing well by all standards  afeb vss  wbc is 10 k  cr is down  no bowel function on my exam  abdomen soft  making urine  issue is that she is more groggy then wednesday as insists on meds  will d/c narcotics and give iv tylenol  this is constant issue but  supportive  while obviously tough course issues have been hygienic and there has been no reason that could not have been more aggressive with rehab pre op  but patient very resistant  have discussed need for her to ambulate and work etc  will wind up need rehab and should be able to go straight home but very resistant to these concepts  will start tpn tomorrow

## 2018-02-02 NOTE — PROGRESS NOTE ADULT - ASSESSMENT
IMPRESSION:  Septic shock secondary to abdominal source.  Sepsis and shock has resolved.  No culture data to guide therapy    Recommend:  1.  Can continue micafungin and zosyn for now.  Would treat empirically for 7 days (ends 2/6).  If patient stable at that point, can stop antibiotics and monitor off    Will sign off.  Reconsult as needed

## 2018-02-02 NOTE — PROGRESS NOTE ADULT - SUBJECTIVE AND OBJECTIVE BOX
Psychiatry Follow Up Note    Patient seen this morning by psychology intern Ange Yeung.  At that time patient appeared sedated but irritable, repeated phrases such as "I need to go home," "help me leave," and "put me to sleep." Denied SI.  Acknowledged that her distress had exacerbated following the reduction in her pain meds.     I attempted to see the patient twice later in the day; both times she refused interview, saying she wanted to sleep and was in pain.  Patient again appeared slightly sedated.    Per RN patient reporting vague VH, frequently requesting pain meds, yelling at  yesterday.    Home Lexapro and Valium restarted by primary team.  Patient receiving prn Dilaudid for pain.    Assessment: 57F complicated medical history, admitted since July, now POD3 following EC fistula take down and revision of aamir-en-y, with history of acting out behaviors during admission (requesting extra Dilaudid, trying to obtain food while NPO), now with exacerbation in unpleasant behaviors following surgery.  Patient likely in certain understandable amount of pain following surgery with limited ongoing resources/coping mechanisms for dealing with stress.  Would continue Lexapro and Valium as ordered, treat post-op pain as in any patient with similar recent surgery and titrate down when appropriate as per pain management team.  Continue to set limits with patient regarding pain management, nutritional status, etc for safety of patient.    Millie INIGUEZ Psychiatry Attending

## 2018-02-02 NOTE — PROGRESS NOTE ADULT - SUBJECTIVE AND OBJECTIVE BOX
24 hr events:    SUBJECTIVE: pain controlled, denies HA, reports nausea w/out vomiting, denies CP/SOB, denies abd pain/flatus, denies F/C/S     MEDICATIONS  (STANDING):  acetaminophen  IVPB. 1000 milliGRAM(s) IV Intermittent once  heparin  flush 100 Units/mL Injectable 100 Unit(s) IV Push every other day  heparin  Injectable 5000 Unit(s) SubCutaneous every 8 hours  HYDROmorphone  Injectable 0.5 milliGRAM(s) IV Push once  insulin lispro (HumaLOG) corrective regimen sliding scale   SubCutaneous Before meals and at bedtime  micafungin IVPB 100 milliGRAM(s) IV Intermittent every 24 hours  piperacillin/tazobactam IVPB. 2.25 Gram(s) IV Intermittent every 6 hours  sodium chloride 0.9%. 1000 milliLiter(s) (100 mL/Hr) IV Continuous <Continuous>  sodium chloride 3%  Inhalation 4 milliLiter(s) Inhalation once    MEDICATIONS  (PRN):  labetalol Injectable 10 milliGRAM(s) IV Push every 2 hours PRN Systolic blood pressure >160  ondansetron Injectable 4 milliGRAM(s) IV Push every 6 hours PRN Nausea and/or Vomiting    ICU Vital Signs Last 24 Hrs  T(C): 37.3 (2018 17:50), Max: 37.3 (2018 17:50)  T(F): 99.1 (2018 17:50), Max: 99.1 (2018 17:50)  HR: 64 (2018 08:00) (62 - 88)  BP: 176/90 (2018 08:00) (106/67 - 200/85)  BP(mean): 129 (2018 08:00) (76 - 149)  ABP: --  ABP(mean): --  RR: 19 (2018 08:00) (12 - 27)  SpO2: 98% (2018 08:00) (87% - 99%)      Physical Exam:  General: NAD  HEENT: NC/AT, EOMI, PERRLA, normal hearing, no oral lesions, neck supple w/o LAD  Pulmonary: Nonlabored breathing, no respiratory distress, CTA-B, 3L NC  Cardiovascular: NSR, no murmurs  Abdominal: soft, NT/ND, -BS, no organomegaly, midline wound vac w/dressing CDI, b/l low abd CHECO seosang  Extremities: WWP, 5/5 strength x 4, no clubbing/cyanosis/edema  Neuro: A/O x3, CNs II-XII grossly intact, normal motor/sensation, no focal deficits  Pulses: palpable distal pulses    I&O's Summary    2018 07:  -  2018 07:00  --------------------------------------------------------  IN: 2900 mL / OUT: 3600 mL / NET: -700 mL    2018 07:01  -  2018 08:48  --------------------------------------------------------  IN: 100 mL / OUT: 125 mL / NET: -25 mL        LABS:                        6.9    9.4   )-----------( 188      ( 2018 04:50 )             21.1     02-    135  |  100  |  25<H>  ----------------------------<  100<H>  4.2   |  25  |  1.53<H>    Ca    8.4      2018 04:50  Phos  3.5     02-  Mg     2.1     -    TPro  6.0  /  Alb  2.6<L>  /  TBili  0.9  /  DBili  x   /  AST  25  /  ALT  26  /  AlkPhos  142<H>  02-      Urinalysis Basic - ( 2018 10:55 )    Color: Yellow / Appearance: Clear / S.010 / pH: x  Gluc: x / Ketone: NEGATIVE  / Bili: Negative / Urobili: 0.2 E.U./dL   Blood: x / Protein: NEGATIVE mg/dL / Nitrite: NEGATIVE   Leuk Esterase: NEGATIVE / RBC: < 5 /HPF / WBC < 5 /HPF   Sq Epi: x / Non Sq Epi: 0-5 /HPF / Bacteria: Present /HPF      CAPILLARY BLOOD GLUCOSE      POCT Blood Glucose.: 94 mg/dL (2018 07:49)  POCT Blood Glucose.: 97 mg/dL (2018 21:16)  POCT Blood Glucose.: 82 mg/dL (2018 11:30)    LIVER FUNCTIONS - ( 2018 05:21 )  Alb: 2.6 g/dL / Pro: 6.0 g/dL / ALK PHOS: 142 U/L / ALT: 26 U/L / AST: 25 U/L / GGT: x 24 hr events: o/n Labetalol 10 x2 for SBP >180s HR 80s w/ improvement. 6pm Hgb 7.2 stable. Given extra 20 of labetalol, AM hgb 6.9; earlier in the day, prevena vac clotted, changed by primary team, skin bleed cauterized. Hg 7.7, repeat 7.4. FeNa >2, but making >100cc/hr, renal US negative for hydro, pending final read.     SUBJECTIVE: pain controlled, denies HA, reports nausea w/out vomiting, denies CP/SOB, denies abd pain/flatus, denies F/C/S     MEDICATIONS  (STANDING):  acetaminophen  IVPB. 1000 milliGRAM(s) IV Intermittent once  heparin  flush 100 Units/mL Injectable 100 Unit(s) IV Push every other day  heparin  Injectable 5000 Unit(s) SubCutaneous every 8 hours  HYDROmorphone  Injectable 0.5 milliGRAM(s) IV Push once  insulin lispro (HumaLOG) corrective regimen sliding scale   SubCutaneous Before meals and at bedtime  micafungin IVPB 100 milliGRAM(s) IV Intermittent every 24 hours  piperacillin/tazobactam IVPB. 2.25 Gram(s) IV Intermittent every 6 hours  sodium chloride 0.9%. 1000 milliLiter(s) (100 mL/Hr) IV Continuous <Continuous>  sodium chloride 3%  Inhalation 4 milliLiter(s) Inhalation once    MEDICATIONS  (PRN):  labetalol Injectable 10 milliGRAM(s) IV Push every 2 hours PRN Systolic blood pressure >160  ondansetron Injectable 4 milliGRAM(s) IV Push every 6 hours PRN Nausea and/or Vomiting    ICU Vital Signs Last 24 Hrs  T(C): 37.3 (2018 17:50), Max: 37.3 (2018 17:50)  T(F): 99.1 (2018 17:50), Max: 99.1 (2018 17:50)  HR: 64 (2018 08:00) (62 - 88)  BP: 176/90 (2018 08:00) (106/67 - 200/85)  BP(mean): 129 (2018 08:00) (76 - 149)  ABP: --  ABP(mean): --  RR: 19 (2018 08:00) (12 - 27)  SpO2: 98% (2018 08:00) (87% - 99%)      Physical Exam:  General: NAD  HEENT: NC/AT, EOMI, PERRLA, normal hearing, no oral lesions, neck supple w/o LAD  Pulmonary: Nonlabored breathing, no respiratory distress, CTA-B, 3L NC  Cardiovascular: NSR, no murmurs  Abdominal: soft, NT/ND, -BS, no organomegaly, midline wound vac w/dressing CDI, b/l low abd CHECO seosang  Extremities: WWP, 5/5 strength x 4, no clubbing/cyanosis/edema  Neuro: A/O x3, CNs II-XII grossly intact, normal motor/sensation, no focal deficits  Pulses: palpable distal pulses    I&O's Summary    2018 07:01  -  2018 07:00  --------------------------------------------------------  IN: 2900 mL / OUT: 3600 mL / NET: -700 mL    2018 07:01  -  2018 08:48  --------------------------------------------------------  IN: 100 mL / OUT: 125 mL / NET: -25 mL        LABS:                        6.9    9.4   )-----------( 188      ( 2018 04:50 )             21.1     02-    135  |  100  |  25<H>  ----------------------------<  100<H>  4.2   |  25  |  1.53<H>    Ca    8.4      2018 04:50  Phos  3.5     02-02  Mg     2.1     02-02    TPro  6.0  /  Alb  2.6<L>  /  TBili  0.9  /  DBili  x   /  AST  25  /  ALT  26  /  AlkPhos  142<H>  02-01      Urinalysis Basic - ( 2018 10:55 )    Color: Yellow / Appearance: Clear / S.010 / pH: x  Gluc: x / Ketone: NEGATIVE  / Bili: Negative / Urobili: 0.2 E.U./dL   Blood: x / Protein: NEGATIVE mg/dL / Nitrite: NEGATIVE   Leuk Esterase: NEGATIVE / RBC: < 5 /HPF / WBC < 5 /HPF   Sq Epi: x / Non Sq Epi: 0-5 /HPF / Bacteria: Present /HPF      CAPILLARY BLOOD GLUCOSE      POCT Blood Glucose.: 94 mg/dL (2018 07:49)  POCT Blood Glucose.: 97 mg/dL (2018 21:16)  POCT Blood Glucose.: 82 mg/dL (2018 11:30)    LIVER FUNCTIONS - ( 2018 05:21 )  Alb: 2.6 g/dL / Pro: 6.0 g/dL / ALK PHOS: 142 U/L / ALT: 26 U/L / AST: 25 U/L / GGT: x 24 hr events: o/n Labetalol 10 x2 for SBP >180s HR 80s w/ improvement. 6pm Hgb 7.2 stable. Given extra 20 of labetalol, AM hgb 6.9; earlier in the day, prevena vac clotted, changed by primary team, skin bleed cauterized. Hg 7.7, repeat 7.4. FeNa >2, but making >100cc/hr, renal US negative for hydro, pending final read.     SUBJECTIVE: pain controlled, denies HA, reports nausea w/out vomiting, denies CP/SOB, denies abd pain/flatus, denies F/C/S     MEDICATIONS  (STANDING):  acetaminophen  IVPB. 1000 milliGRAM(s) IV Intermittent once  heparin  flush 100 Units/mL Injectable 100 Unit(s) IV Push every other day  heparin  Injectable 5000 Unit(s) SubCutaneous every 8 hours  HYDROmorphone  Injectable 0.5 milliGRAM(s) IV Push once  insulin lispro (HumaLOG) corrective regimen sliding scale   SubCutaneous Before meals and at bedtime  micafungin IVPB 100 milliGRAM(s) IV Intermittent every 24 hours  piperacillin/tazobactam IVPB. 2.25 Gram(s) IV Intermittent every 6 hours  sodium chloride 0.9%. 1000 milliLiter(s) (100 mL/Hr) IV Continuous <Continuous>  sodium chloride 3%  Inhalation 4 milliLiter(s) Inhalation once    MEDICATIONS  (PRN):  labetalol Injectable 10 milliGRAM(s) IV Push every 2 hours PRN Systolic blood pressure >160  ondansetron Injectable 4 milliGRAM(s) IV Push every 6 hours PRN Nausea and/or Vomiting    ICU Vital Signs Last 24 Hrs  T(C): 37.3 (2018 17:50), Max: 37.3 (2018 17:50)  T(F): 99.1 (2018 17:50), Max: 99.1 (2018 17:50)  HR: 64 (2018 08:00) (62 - 88)  BP: 176/90 (2018 08:00) (106/67 - 200/85)  BP(mean): 129 (2018 08:00) (76 - 149)  ABP: --  ABP(mean): --  RR: 19 (2018 08:00) (12 - 27)  SpO2: 98% (2018 08:00) (87% - 99%)      Physical Exam:  General: NAD  HEENT: NC/AT, EOMI, normal hearing  Pulmonary: Nonlabored breathing, no respiratory distress, CTA-B, 3L NC  Cardiovascular: NSR, no murmurs  Abdominal: soft, NT/ND, -BS, no organomegaly, midline wound vac w/dressing CDI, b/l low abd CHECO seosang  Extremities: WWP  Neuro: A/O x3, normal motor/sensation, no focal deficits, pain controlled  Pulses: palpable distal pulses    I&O's Summary    2018 07:01  -  2018 07:00  --------------------------------------------------------  IN: 2900 mL / OUT: 3600 mL / NET: -700 mL    2018 07:01  -  2018 08:48  --------------------------------------------------------  IN: 100 mL / OUT: 125 mL / NET: -25 mL        LABS:                        6.9    9.4   )-----------( 188      ( 2018 04:50 )             21.1     02-    135  |  100  |  25<H>  ----------------------------<  100<H>  4.2   |  25  |  1.53<H>    Ca    8.4      2018 04:50  Phos  3.5     02-  Mg     2.1     02-    TPro  6.0  /  Alb  2.6<L>  /  TBili  0.9  /  DBili  x   /  AST  25  /  ALT  26  /  AlkPhos  142<H>  02-      Urinalysis Basic - ( 2018 10:55 )    Color: Yellow / Appearance: Clear / S.010 / pH: x  Gluc: x / Ketone: NEGATIVE  / Bili: Negative / Urobili: 0.2 E.U./dL   Blood: x / Protein: NEGATIVE mg/dL / Nitrite: NEGATIVE   Leuk Esterase: NEGATIVE / RBC: < 5 /HPF / WBC < 5 /HPF   Sq Epi: x / Non Sq Epi: 0-5 /HPF / Bacteria: Present /HPF      CAPILLARY BLOOD GLUCOSE      POCT Blood Glucose.: 94 mg/dL (2018 07:49)  POCT Blood Glucose.: 97 mg/dL (2018 21:16)  POCT Blood Glucose.: 82 mg/dL (2018 11:30)    LIVER FUNCTIONS - ( 2018 05:21 )  Alb: 2.6 g/dL / Pro: 6.0 g/dL / ALK PHOS: 142 U/L / ALT: 26 U/L / AST: 25 U/L / GGT: x

## 2018-02-02 NOTE — PROGRESS NOTE ADULT - SUBJECTIVE AND OBJECTIVE BOX
INTERVAL HPI/OVERNIGHT EVENTS:    Patient was seen and examined at beside.  Just received dilaudid, not very talkative but offered no complaints.  Denies abdominal pain. Not on pressors    CONSTITUTIONAL:  Negative fever or chills, feels well, good appetite  EYES:  Negative  blurry vision or double vision  CARDIOVASCULAR:  Negative for chest pain or palpitations  RESPIRATORY:  Negative for cough, wheezing, or SOB   GASTROINTESTINAL:  Negative for nausea, vomiting, diarrhea, constipation, or abdominal pain  GENITOURINARY:  Negative frequency, urgency or dysuria  NEUROLOGIC:  No headache, confusion, dizziness, lightheadedness      ANTIBIOTICS/RELEVANT:    MEDICATIONS  (STANDING):  acetaminophen  IVPB. 1000 milliGRAM(s) IV Intermittent once  escitalopram 20 milliGRAM(s) Oral daily  heparin  flush 100 Units/mL Injectable 100 Unit(s) IV Push every other day  heparin  Injectable 5000 Unit(s) SubCutaneous every 8 hours  insulin lispro (HumaLOG) corrective regimen sliding scale   SubCutaneous Before meals and at bedtime  micafungin IVPB 100 milliGRAM(s) IV Intermittent every 24 hours  piperacillin/tazobactam IVPB. 2.25 Gram(s) IV Intermittent every 6 hours  sodium chloride 0.9%. 1000 milliLiter(s) (100 mL/Hr) IV Continuous <Continuous>  sodium chloride 3%  Inhalation 4 milliLiter(s) Inhalation once    MEDICATIONS  (PRN):  HYDROmorphone  Injectable 0.5 milliGRAM(s) IV Push every 4 hours PRN Severe Pain (7 - 10)  HYDROmorphone  Injectable 0.25 milliGRAM(s) IV Push every 4 hours PRN Moderate Pain (4 - 6)  labetalol Injectable 10 milliGRAM(s) IV Push every 2 hours PRN Systolic blood pressure >160  ondansetron Injectable 4 milliGRAM(s) IV Push every 6 hours PRN Nausea and/or Vomiting        Vital Signs Last 24 Hrs  T(C): 36.2 (2018 09:15), Max: 37.3 (2018 17:50)  T(F): 97.1 (2018 09:15), Max: 99.1 (2018 17:50)  HR: 62 (2018 11:15) (62 - 88)  BP: 116/63 (2018 11:15) (106/67 - 200/85)  BP(mean): 87 (2018 11:15) (76 - 149)  RR: 13 (2018 11:15) (12 - 27)  SpO2: 96% (2018 11:15) (87% - 98%)    PHYSICAL EXAM:  Constitutional:  obese female, no distress  Eyes: no icterus   Ear/Nose/Throat: no sinus tenderness on percussion	  Neck:no supple, central line in place  Respiratory: CTA eileen  Cardiovascular: S1S2 RRR, no murmurs  Gastrointestinal:soft, (+) BS, no HSM; small wound vac in place, 2 drains, one on each side with reddish fluid   Extremities:no edema  Vascular: DP Pulse:	right normal; left normal      LABS:                        6.9    9.4   )-----------( 188      ( 2018 04:50 )             21.1     02-    135  |  100  |  25<H>  ----------------------------<  100<H>  4.2   |  25  |  1.53<H>    Ca    8.4      2018 04:50  Phos  3.5     02-  Mg     2.1     02-    TPro  6.0  /  Alb  2.6<L>  /  TBili  0.9  /  DBili  x   /  AST  25  /  ALT  26  /  AlkPhos  142<H>        Urinalysis Basic - ( 2018 10:55 )    Color: Yellow / Appearance: Clear / S.010 / pH: x  Gluc: x / Ketone: NEGATIVE  / Bili: Negative / Urobili: 0.2 E.U./dL   Blood: x / Protein: NEGATIVE mg/dL / Nitrite: NEGATIVE   Leuk Esterase: NEGATIVE / RBC: < 5 /HPF / WBC < 5 /HPF   Sq Epi: x / Non Sq Epi: 0-5 /HPF / Bacteria: Present /HPF        MICROBIOLOGY:    RADIOLOGY & ADDITIONAL STUDIES:

## 2018-02-02 NOTE — PROGRESS NOTE ADULT - ASSESSMENT
57 F s/p  esophagojejunostomy revision c/b EC fistula now s/p medical optimization of EC fistula and return to OR for EC fistula take down and subsequent revision of her aamir-en-y (1/30)    Neuro: Dilaudid prn, acetaminophen PRN, ativan prn, zofran prn  CV: MAP goal 65,labetalol prn  Pulm: NC  FENGI: NPO. IVF, PPI, NS@100  : Aimee  Endo: ISS  ID: zosyn (1/30-2/4), melany (1/30-2/4), /// discontinued Vanc (1/30-1/31)   Ppx: SCDs, SQH  Lines: PICC line to be exchanged, R radial [place]  Wounds: midline wound with prevena Vac. SubQ CHECO x 2 57 F s/p  esophagojejunostomy revision c/b EC fistula now s/p medical optimization of EC fistula and return to OR for EC fistula take down and subsequent revision of her aamir-en-y (1/30)    Neuro:   CV: MAP goal 65,labetalol prn  Pulm: NC  FENGI: NPO. IVF, PPI, NS@100  : Yu  Endo: ISS  ID: zosyn (1/30-2/4), melany (1/30-2/4), /// discontinued Vanc (1/30-1/31)   Ppx: SCDs, SQH  Lines: PICC line to be exchanged, R radial Radha  Wounds: midline wound with prevena Vac. SubQ CHECO x 2 57 F s/p  esophagojejunostomy revision c/b EC fistula now s/p medical optimization of EC fistula and return to OR for EC fistula take down and subsequent revision of her aamir-en-y (1/30)    Neuro: Dilaudid prn, acetaminophen PRN, ativan prn, zofran prn Lexapro 20 and diazapam 5 qhs   CV: MAP goal 65,labetalol prn  Pulm: NC  FENGI: NPO. IVF, PPI, NS@100  : Aimee  Endo: ISS  ID: zosyn (1/30-2/4), melany (1/30-2/4), /// discontinued Vanc (1/30-1/31)   Ppx: SCDs, SQH  Lines: PICC line to be exchanged, R radial Radha  Wounds: midline wound with prevena Vac. SubQ CHECO x 2

## 2018-02-03 LAB
ANION GAP SERPL CALC-SCNC: 16 MMOL/L — SIGNIFICANT CHANGE UP (ref 5–17)
BLD GP AB SCN SERPL QL: NEGATIVE — SIGNIFICANT CHANGE UP
BUN SERPL-MCNC: 16 MG/DL — SIGNIFICANT CHANGE UP (ref 7–23)
CALCIUM SERPL-MCNC: 8.3 MG/DL — LOW (ref 8.4–10.5)
CHLORIDE SERPL-SCNC: 101 MMOL/L — SIGNIFICANT CHANGE UP (ref 96–108)
CO2 SERPL-SCNC: 22 MMOL/L — SIGNIFICANT CHANGE UP (ref 22–31)
CREAT SERPL-MCNC: 1.07 MG/DL — SIGNIFICANT CHANGE UP (ref 0.5–1.3)
GLUCOSE BLDC GLUCOMTR-MCNC: 107 MG/DL — HIGH (ref 70–99)
GLUCOSE BLDC GLUCOMTR-MCNC: 68 MG/DL — LOW (ref 70–99)
GLUCOSE BLDC GLUCOMTR-MCNC: 71 MG/DL — SIGNIFICANT CHANGE UP (ref 70–99)
GLUCOSE BLDC GLUCOMTR-MCNC: 74 MG/DL — SIGNIFICANT CHANGE UP (ref 70–99)
GLUCOSE SERPL-MCNC: 68 MG/DL — LOW (ref 70–99)
HCT VFR BLD CALC: 19.5 % — CRITICAL LOW (ref 34.5–45)
HCT VFR BLD CALC: 19.8 % — CRITICAL LOW (ref 34.5–45)
HCT VFR BLD CALC: 25.1 % — LOW (ref 34.5–45)
HGB BLD-MCNC: 6.4 G/DL — CRITICAL LOW (ref 11.5–15.5)
HGB BLD-MCNC: 6.5 G/DL — CRITICAL LOW (ref 11.5–15.5)
HGB BLD-MCNC: 8.2 G/DL — LOW (ref 11.5–15.5)
MAGNESIUM SERPL-MCNC: 1.8 MG/DL — SIGNIFICANT CHANGE UP (ref 1.6–2.6)
MCHC RBC-ENTMCNC: 27.4 PG — SIGNIFICANT CHANGE UP (ref 27–34)
MCHC RBC-ENTMCNC: 27.4 PG — SIGNIFICANT CHANGE UP (ref 27–34)
MCHC RBC-ENTMCNC: 27.5 PG — SIGNIFICANT CHANGE UP (ref 27–34)
MCHC RBC-ENTMCNC: 32.7 G/DL — SIGNIFICANT CHANGE UP (ref 32–36)
MCHC RBC-ENTMCNC: 32.8 G/DL — SIGNIFICANT CHANGE UP (ref 32–36)
MCHC RBC-ENTMCNC: 32.8 G/DL — SIGNIFICANT CHANGE UP (ref 32–36)
MCV RBC AUTO: 83.5 FL — SIGNIFICANT CHANGE UP (ref 80–100)
MCV RBC AUTO: 83.7 FL — SIGNIFICANT CHANGE UP (ref 80–100)
MCV RBC AUTO: 83.9 FL — SIGNIFICANT CHANGE UP (ref 80–100)
PHOSPHATE SERPL-MCNC: 2.5 MG/DL — SIGNIFICANT CHANGE UP (ref 2.5–4.5)
PLATELET # BLD AUTO: 191 K/UL — SIGNIFICANT CHANGE UP (ref 150–400)
PLATELET # BLD AUTO: 203 K/UL — SIGNIFICANT CHANGE UP (ref 150–400)
PLATELET # BLD AUTO: 250 K/UL — SIGNIFICANT CHANGE UP (ref 150–400)
POTASSIUM SERPL-MCNC: 3.8 MMOL/L — SIGNIFICANT CHANGE UP (ref 3.5–5.3)
POTASSIUM SERPL-SCNC: 3.8 MMOL/L — SIGNIFICANT CHANGE UP (ref 3.5–5.3)
RBC # BLD: 2.33 M/UL — LOW (ref 3.8–5.2)
RBC # BLD: 2.37 M/UL — LOW (ref 3.8–5.2)
RBC # BLD: 2.99 M/UL — LOW (ref 3.8–5.2)
RBC # FLD: 15.6 % — SIGNIFICANT CHANGE UP (ref 10.3–16.9)
RBC # FLD: 16.9 % — SIGNIFICANT CHANGE UP (ref 10.3–16.9)
RBC # FLD: 17.1 % — HIGH (ref 10.3–16.9)
RH IG SCN BLD-IMP: POSITIVE — SIGNIFICANT CHANGE UP
SODIUM SERPL-SCNC: 139 MMOL/L — SIGNIFICANT CHANGE UP (ref 135–145)
WBC # BLD: 11.6 K/UL — HIGH (ref 3.8–10.5)
WBC # BLD: 8.7 K/UL — SIGNIFICANT CHANGE UP (ref 3.8–10.5)
WBC # BLD: 9 K/UL — SIGNIFICANT CHANGE UP (ref 3.8–10.5)
WBC # FLD AUTO: 11.6 K/UL — HIGH (ref 3.8–10.5)
WBC # FLD AUTO: 8.7 K/UL — SIGNIFICANT CHANGE UP (ref 3.8–10.5)
WBC # FLD AUTO: 9 K/UL — SIGNIFICANT CHANGE UP (ref 3.8–10.5)

## 2018-02-03 PROCEDURE — 74018 RADEX ABDOMEN 1 VIEW: CPT | Mod: 26

## 2018-02-03 PROCEDURE — 99233 SBSQ HOSP IP/OBS HIGH 50: CPT | Mod: GC

## 2018-02-03 RX ORDER — ACETAMINOPHEN 500 MG
1000 TABLET ORAL ONCE
Qty: 0 | Refills: 0 | Status: COMPLETED | OUTPATIENT
Start: 2018-02-03 | End: 2018-02-03

## 2018-02-03 RX ORDER — MAGNESIUM SULFATE 500 MG/ML
1 VIAL (ML) INJECTION ONCE
Qty: 0 | Refills: 0 | Status: COMPLETED | OUTPATIENT
Start: 2018-02-03 | End: 2018-02-03

## 2018-02-03 RX ORDER — DEXTROSE 50 % IN WATER 50 %
25 SYRINGE (ML) INTRAVENOUS ONCE
Qty: 0 | Refills: 0 | Status: COMPLETED | OUTPATIENT
Start: 2018-02-03 | End: 2018-02-03

## 2018-02-03 RX ORDER — LOSARTAN POTASSIUM 100 MG/1
50 TABLET, FILM COATED ORAL DAILY
Qty: 0 | Refills: 0 | Status: DISCONTINUED | OUTPATIENT
Start: 2018-02-03 | End: 2018-02-05

## 2018-02-03 RX ORDER — METOCLOPRAMIDE HCL 10 MG
10 TABLET ORAL ONCE
Qty: 0 | Refills: 0 | Status: DISCONTINUED | OUTPATIENT
Start: 2018-02-03 | End: 2018-02-05

## 2018-02-03 RX ORDER — POTASSIUM PHOSPHATE, MONOBASIC POTASSIUM PHOSPHATE, DIBASIC 236; 224 MG/ML; MG/ML
15 INJECTION, SOLUTION INTRAVENOUS ONCE
Qty: 0 | Refills: 0 | Status: COMPLETED | OUTPATIENT
Start: 2018-02-03 | End: 2018-02-03

## 2018-02-03 RX ADMIN — Medication 25 MILLILITER(S): at 06:39

## 2018-02-03 RX ADMIN — Medication 100 GRAM(S): at 10:50

## 2018-02-03 RX ADMIN — LOSARTAN POTASSIUM 50 MILLIGRAM(S): 100 TABLET, FILM COATED ORAL at 14:57

## 2018-02-03 RX ADMIN — POTASSIUM PHOSPHATE, MONOBASIC POTASSIUM PHOSPHATE, DIBASIC 62.5 MILLIMOLE(S): 236; 224 INJECTION, SOLUTION INTRAVENOUS at 10:50

## 2018-02-03 RX ADMIN — HEPARIN SODIUM 5000 UNIT(S): 5000 INJECTION INTRAVENOUS; SUBCUTANEOUS at 15:37

## 2018-02-03 RX ADMIN — MICAFUNGIN SODIUM 105 MILLIGRAM(S): 100 INJECTION, POWDER, LYOPHILIZED, FOR SOLUTION INTRAVENOUS at 21:41

## 2018-02-03 RX ADMIN — ESCITALOPRAM OXALATE 20 MILLIGRAM(S): 10 TABLET, FILM COATED ORAL at 14:46

## 2018-02-03 RX ADMIN — Medication 1000 MILLIGRAM(S): at 16:00

## 2018-02-03 RX ADMIN — Medication 400 MILLIGRAM(S): at 15:59

## 2018-02-03 RX ADMIN — Medication 5 MILLIGRAM(S): at 21:40

## 2018-02-03 RX ADMIN — ONDANSETRON 4 MILLIGRAM(S): 8 TABLET, FILM COATED ORAL at 15:37

## 2018-02-03 RX ADMIN — Medication 400 MILLIGRAM(S): at 01:32

## 2018-02-03 RX ADMIN — PIPERACILLIN AND TAZOBACTAM 200 GRAM(S): 4; .5 INJECTION, POWDER, LYOPHILIZED, FOR SOLUTION INTRAVENOUS at 03:09

## 2018-02-03 RX ADMIN — HEPARIN SODIUM 5000 UNIT(S): 5000 INJECTION INTRAVENOUS; SUBCUTANEOUS at 06:38

## 2018-02-03 RX ADMIN — PIPERACILLIN AND TAZOBACTAM 200 GRAM(S): 4; .5 INJECTION, POWDER, LYOPHILIZED, FOR SOLUTION INTRAVENOUS at 21:40

## 2018-02-03 RX ADMIN — Medication 400 MILLIGRAM(S): at 22:27

## 2018-02-03 RX ADMIN — HEPARIN SODIUM 5000 UNIT(S): 5000 INJECTION INTRAVENOUS; SUBCUTANEOUS at 21:41

## 2018-02-03 RX ADMIN — Medication 1000 MILLIGRAM(S): at 02:00

## 2018-02-03 RX ADMIN — Medication 1000 MILLIGRAM(S): at 22:33

## 2018-02-03 RX ADMIN — PIPERACILLIN AND TAZOBACTAM 200 GRAM(S): 4; .5 INJECTION, POWDER, LYOPHILIZED, FOR SOLUTION INTRAVENOUS at 15:58

## 2018-02-03 RX ADMIN — PIPERACILLIN AND TAZOBACTAM 200 GRAM(S): 4; .5 INJECTION, POWDER, LYOPHILIZED, FOR SOLUTION INTRAVENOUS at 10:49

## 2018-02-03 NOTE — PROGRESS NOTE ADULT - SUBJECTIVE AND OBJECTIVE BOX
On interval followup, the left int jugular catheter site is clean, dry, non-inflamed and non-tender.  T99 range. Notes and cultures are followed.

## 2018-02-04 LAB
ANION GAP SERPL CALC-SCNC: 17 MMOL/L — SIGNIFICANT CHANGE UP (ref 5–17)
BUN SERPL-MCNC: 11 MG/DL — SIGNIFICANT CHANGE UP (ref 7–23)
CALCIUM SERPL-MCNC: 8.3 MG/DL — LOW (ref 8.4–10.5)
CHLORIDE SERPL-SCNC: 101 MMOL/L — SIGNIFICANT CHANGE UP (ref 96–108)
CO2 SERPL-SCNC: 21 MMOL/L — LOW (ref 22–31)
CREAT SERPL-MCNC: 0.91 MG/DL — SIGNIFICANT CHANGE UP (ref 0.5–1.3)
GLUCOSE BLDC GLUCOMTR-MCNC: 100 MG/DL — HIGH (ref 70–99)
GLUCOSE BLDC GLUCOMTR-MCNC: 66 MG/DL — LOW (ref 70–99)
GLUCOSE BLDC GLUCOMTR-MCNC: 67 MG/DL — LOW (ref 70–99)
GLUCOSE BLDC GLUCOMTR-MCNC: 67 MG/DL — LOW (ref 70–99)
GLUCOSE BLDC GLUCOMTR-MCNC: 69 MG/DL — LOW (ref 70–99)
GLUCOSE BLDC GLUCOMTR-MCNC: 75 MG/DL — SIGNIFICANT CHANGE UP (ref 70–99)
GLUCOSE SERPL-MCNC: 72 MG/DL — SIGNIFICANT CHANGE UP (ref 70–99)
HCT VFR BLD CALC: 22.9 % — LOW (ref 34.5–45)
HGB BLD-MCNC: 7.6 G/DL — LOW (ref 11.5–15.5)
MAGNESIUM SERPL-MCNC: 1.8 MG/DL — SIGNIFICANT CHANGE UP (ref 1.6–2.6)
MCHC RBC-ENTMCNC: 27.6 PG — SIGNIFICANT CHANGE UP (ref 27–34)
MCHC RBC-ENTMCNC: 33.2 G/DL — SIGNIFICANT CHANGE UP (ref 32–36)
MCV RBC AUTO: 83.3 FL — SIGNIFICANT CHANGE UP (ref 80–100)
PHOSPHATE SERPL-MCNC: 2.6 MG/DL — SIGNIFICANT CHANGE UP (ref 2.5–4.5)
PLATELET # BLD AUTO: 218 K/UL — SIGNIFICANT CHANGE UP (ref 150–400)
POTASSIUM SERPL-MCNC: 3.4 MMOL/L — LOW (ref 3.5–5.3)
POTASSIUM SERPL-SCNC: 3.4 MMOL/L — LOW (ref 3.5–5.3)
RBC # BLD: 2.75 M/UL — LOW (ref 3.8–5.2)
RBC # FLD: 16.5 % — SIGNIFICANT CHANGE UP (ref 10.3–16.9)
SODIUM SERPL-SCNC: 139 MMOL/L — SIGNIFICANT CHANGE UP (ref 135–145)
WBC # BLD: 10.6 K/UL — HIGH (ref 3.8–10.5)
WBC # FLD AUTO: 10.6 K/UL — HIGH (ref 3.8–10.5)

## 2018-02-04 RX ORDER — LABETALOL HCL 100 MG
5 TABLET ORAL ONCE
Qty: 0 | Refills: 0 | Status: COMPLETED | OUTPATIENT
Start: 2018-02-04 | End: 2018-02-04

## 2018-02-04 RX ORDER — ACETAMINOPHEN 500 MG
1000 TABLET ORAL ONCE
Qty: 0 | Refills: 0 | Status: COMPLETED | OUTPATIENT
Start: 2018-02-04 | End: 2018-02-05

## 2018-02-04 RX ORDER — SODIUM CHLORIDE 9 MG/ML
1000 INJECTION INTRAMUSCULAR; INTRAVENOUS; SUBCUTANEOUS
Qty: 0 | Refills: 0 | Status: DISCONTINUED | OUTPATIENT
Start: 2018-02-04 | End: 2018-02-05

## 2018-02-04 RX ORDER — MAGNESIUM SULFATE 500 MG/ML
2 VIAL (ML) INJECTION ONCE
Qty: 0 | Refills: 0 | Status: COMPLETED | OUTPATIENT
Start: 2018-02-04 | End: 2018-02-04

## 2018-02-04 RX ORDER — POTASSIUM PHOSPHATE, MONOBASIC POTASSIUM PHOSPHATE, DIBASIC 236; 224 MG/ML; MG/ML
15 INJECTION, SOLUTION INTRAVENOUS ONCE
Qty: 0 | Refills: 0 | Status: COMPLETED | OUTPATIENT
Start: 2018-02-04 | End: 2018-02-04

## 2018-02-04 RX ORDER — ACETAMINOPHEN 500 MG
1000 TABLET ORAL ONCE
Qty: 0 | Refills: 0 | Status: COMPLETED | OUTPATIENT
Start: 2018-02-04 | End: 2018-02-04

## 2018-02-04 RX ORDER — DEXTROSE 50 % IN WATER 50 %
25 SYRINGE (ML) INTRAVENOUS ONCE
Qty: 0 | Refills: 0 | Status: COMPLETED | OUTPATIENT
Start: 2018-02-04 | End: 2018-02-04

## 2018-02-04 RX ORDER — POTASSIUM CHLORIDE 20 MEQ
20 PACKET (EA) ORAL
Qty: 0 | Refills: 0 | Status: COMPLETED | OUTPATIENT
Start: 2018-02-04 | End: 2018-02-04

## 2018-02-04 RX ADMIN — PIPERACILLIN AND TAZOBACTAM 200 GRAM(S): 4; .5 INJECTION, POWDER, LYOPHILIZED, FOR SOLUTION INTRAVENOUS at 03:26

## 2018-02-04 RX ADMIN — Medication 100 UNIT(S): at 13:28

## 2018-02-04 RX ADMIN — PIPERACILLIN AND TAZOBACTAM 200 GRAM(S): 4; .5 INJECTION, POWDER, LYOPHILIZED, FOR SOLUTION INTRAVENOUS at 09:09

## 2018-02-04 RX ADMIN — Medication 5 MILLIGRAM(S): at 21:03

## 2018-02-04 RX ADMIN — LOSARTAN POTASSIUM 50 MILLIGRAM(S): 100 TABLET, FILM COATED ORAL at 05:29

## 2018-02-04 RX ADMIN — Medication 100 MILLIEQUIVALENT(S): at 08:55

## 2018-02-04 RX ADMIN — Medication 100 MILLIEQUIVALENT(S): at 10:21

## 2018-02-04 RX ADMIN — Medication 10 MILLIGRAM(S): at 13:27

## 2018-02-04 RX ADMIN — Medication 1000 MILLIGRAM(S): at 12:00

## 2018-02-04 RX ADMIN — Medication 25 MILLILITER(S): at 09:09

## 2018-02-04 RX ADMIN — POTASSIUM PHOSPHATE, MONOBASIC POTASSIUM PHOSPHATE, DIBASIC 63.75 MILLIMOLE(S): 236; 224 INJECTION, SOLUTION INTRAVENOUS at 10:21

## 2018-02-04 RX ADMIN — Medication 5 MILLIGRAM(S): at 21:05

## 2018-02-04 RX ADMIN — ONDANSETRON 4 MILLIGRAM(S): 8 TABLET, FILM COATED ORAL at 08:50

## 2018-02-04 RX ADMIN — HEPARIN SODIUM 5000 UNIT(S): 5000 INJECTION INTRAVENOUS; SUBCUTANEOUS at 13:27

## 2018-02-04 RX ADMIN — PIPERACILLIN AND TAZOBACTAM 200 GRAM(S): 4; .5 INJECTION, POWDER, LYOPHILIZED, FOR SOLUTION INTRAVENOUS at 15:00

## 2018-02-04 RX ADMIN — Medication 10 MILLIGRAM(S): at 10:33

## 2018-02-04 RX ADMIN — Medication 10 MILLIGRAM(S): at 19:00

## 2018-02-04 RX ADMIN — ESCITALOPRAM OXALATE 20 MILLIGRAM(S): 10 TABLET, FILM COATED ORAL at 11:35

## 2018-02-04 RX ADMIN — Medication 50 GRAM(S): at 06:43

## 2018-02-04 RX ADMIN — HEPARIN SODIUM 5000 UNIT(S): 5000 INJECTION INTRAVENOUS; SUBCUTANEOUS at 05:30

## 2018-02-04 RX ADMIN — MICAFUNGIN SODIUM 105 MILLIGRAM(S): 100 INJECTION, POWDER, LYOPHILIZED, FOR SOLUTION INTRAVENOUS at 22:15

## 2018-02-04 RX ADMIN — PIPERACILLIN AND TAZOBACTAM 200 GRAM(S): 4; .5 INJECTION, POWDER, LYOPHILIZED, FOR SOLUTION INTRAVENOUS at 21:08

## 2018-02-04 RX ADMIN — Medication 100 MILLIEQUIVALENT(S): at 06:43

## 2018-02-04 RX ADMIN — HEPARIN SODIUM 5000 UNIT(S): 5000 INJECTION INTRAVENOUS; SUBCUTANEOUS at 21:05

## 2018-02-04 RX ADMIN — Medication 10 MILLIGRAM(S): at 17:16

## 2018-02-04 RX ADMIN — ONDANSETRON 4 MILLIGRAM(S): 8 TABLET, FILM COATED ORAL at 19:01

## 2018-02-04 RX ADMIN — Medication 400 MILLIGRAM(S): at 11:24

## 2018-02-04 RX ADMIN — SODIUM CHLORIDE 100 MILLILITER(S): 9 INJECTION INTRAMUSCULAR; INTRAVENOUS; SUBCUTANEOUS at 22:15

## 2018-02-04 NOTE — CHART NOTE - NSCHARTNOTEFT_GEN_A_CORE
Admitting Diagnosis:   57 F s/p  esophagojejunostomy revision c/b EC fistula now s/p medical optimization of EC fistula and return to OR for EC fistula take down and subsequent revision of her aamir-en-y (1/30)    PAST MEDICAL & SURGICAL HISTORY:  Reflux  Obesity  DVT (deep venous thrombosis): 2013 neck  Diverticulitis  Hypertension  Elective surgery: abodominal wall surgery  dec 2016  Elective surgery: NOV 2016 gastric bypass revision  Gastric bypass status for obesity: gastric sleeve, 6/2012  S/P breast biopsy: 2011, left  S/P colon resection: 2011  S/P knee surgery: repair 2009, 2011  right; left  Umbilical hernia: 2000  S/P appendectomy: 1975  S/P tonsillectomy: 1967    Current Nutrition Order: Clears + ensure clear TID (720 kcal & 24 g protein)    PO Intake: Good (%) [   ]  Fair (50-75%) [ X  ] Poor (<25%) [   ]    GI Issues: Emesis episode     Pain: Being managed    Skin Integrity: Surgical incision    Labs:   02-04    139  |  101  |  11  ----------------------------<  72  3.4<L>   |  21<L>  |  0.91    Ca    8.3<L>      04 Feb 2018 05:24  Phos  2.6     02-04  Mg     1.8     02-04    POCT Blood Glucose.: 100 mg/dL (04 Feb 2018 09:28)  POCT Blood Glucose.: 67 mg/dL (04 Feb 2018 08:50)  POCT Blood Glucose.: 66 mg/dL (04 Feb 2018 05:31)  POCT Blood Glucose.: 71 mg/dL (03 Feb 2018 21:55)    Medications:  MEDICATIONS  (STANDING):  diazepam    Tablet 5 milliGRAM(s) Oral at bedtime  escitalopram 20 milliGRAM(s) Oral daily  heparin  flush 100 Units/mL Injectable 100 Unit(s) IV Push every other day  heparin  Injectable 5000 Unit(s) SubCutaneous every 8 hours  insulin lispro (HumaLOG) corrective regimen sliding scale   SubCutaneous Before meals and at bedtime  losartan 50 milliGRAM(s) Oral daily  metoclopramide Injectable 10 milliGRAM(s) IV Push once  micafungin IVPB 100 milliGRAM(s) IV Intermittent every 24 hours  piperacillin/tazobactam IVPB. 2.25 Gram(s) IV Intermittent every 6 hours  sodium chloride 0.9%. 1000 milliLiter(s) (100 mL/Hr) IV Continuous <Continuous>  sodium chloride 3%  Inhalation 4 milliLiter(s) Inhalation once    MEDICATIONS  (PRN):  labetalol Injectable 10 milliGRAM(s) IV Push every 2 hours PRN Systolic blood pressure >160  ondansetron Injectable 4 milliGRAM(s) IV Push every 6 hours PRN Nausea and/or Vomiting    Weight:  155# (1/30)  174.8# (1/15)  175.1 (1/8/18)  172.5 (12/19)  171.5lb (12/14)  167lb (11/13)  164lb (10/17)  219lb (8/1)    Weight Change: Indicates 20# wt loss since 1/15    Estimated energy needs using 52 kg IBW:  EER increased for wound healing & post op  30-35 kcal/kg (0285-7707 kcal).   1.8-2 g/kg ( g protein).   30-35 mL/kg (2849-4118 mL fluid).     Subjective: Tolerating clear liquids with one emesis episode. Per PA, TPN will not be restarted & diet to be advanced to full liquids.     Previous Nutrition Diagnosis: Increased nutrient needs r/t increased demand for nutrients and protein AEB: wound/PU and s/p surgery.     Active [X   ]  Resolved [   ]    Goal: Meet % of nutrition needs via tolerated route.     Recommendations:   1. Advance diet as feasible to bariatric stage 3 diet with ensure high protein  2. Monitor weights to determine if pt will continue to need TPN  3. Add MVI, B12 & Vitamin D  4. Lytes to be monitored & repleted by primary team    Education: N/A this visit    Risk Level: High [ X  ] Moderate [   ] Low [   ]

## 2018-02-04 NOTE — PROGRESS NOTE ADULT - SUBJECTIVE AND OBJECTIVE BOX
SUBJECTIVE: Pt seen and examined at bedside.  24Hr events:  o/n: vomited x1, K and Mag replaced, hb    2/3: Hgb 6.5- repeat:8.2 . labs repleted abd xray neg for ileus started cld poss sdu     Vital Signs Last 24 Hrs  T(C): 37.2 (04 Feb 2018 09:46), Max: 37.3 (03 Feb 2018 18:10)  T(F): 98.9 (04 Feb 2018 09:46), Max: 99.2 (03 Feb 2018 18:10)  HR: 74 (04 Feb 2018 11:04) (74 - 94)  BP: 159/89 (04 Feb 2018 11:04) (118/80 - 193/97)  BP(mean): 113 (04 Feb 2018 11:04) (98 - 144)  RR: 27 (04 Feb 2018 11:04) (18 - 33)  SpO2: 99% (04 Feb 2018 11:04) (88% - 99%)    PHYSICAL EXAM    General: NAD  CV: RRR  Pulm: CTAB  Abd: soft, NT/ND, midline prevena vac with good suction. b/l lower abd CHECO with seosanguinous output  Extremities: WWP        LABS:                        7.6    10.6  )-----------( 218      ( 04 Feb 2018 05:19 )             22.9     02-04    139  |  101  |  11  ----------------------------<  72  3.4<L>   |  21<L>  |  0.91    Ca    8.3<L>      04 Feb 2018 05:24  Phos  2.6     02-04  Mg     1.8     02-04      PT/INR - ( 02 Feb 2018 14:19 )   PT: 14.2 sec;   INR: 1.27          PTT - ( 02 Feb 2018 14:19 )  PTT:39.3 sec      ASSESSMENT AND PLAN  57 F s/p  esophagojejunostomy revision c/b EC fistula now s/p medical optimization of EC fistula and return to OR for EC fistula take down and subsequent revision of her aamir-en-y (1/30)    Neuro: acetaminophen PRN,  zofran prn Lexapro 20 and diazapam 5 qhs  CV: MAP goal 65, labetalol prn  Pulm: NC  FENGI: CLD IVF, PPI, NS@100  : Aieme  Endo: ISS  ID: zosyn (1/30-2/6), melany (1/30-2/6), /// discontinued Vanc (1/30-1/31)   Ppx: SCDs, SQH  Lines: PICC line to be exchanged, R radial Radha  Wounds: midline wound with prevena Vac. SubQ CHECO x 2

## 2018-02-04 NOTE — PROGRESS NOTE ADULT - ATTENDING COMMENTS
Patient seen and examined with house-staff during bedside rounds.  Resident note read, including vitals, physical findings, laboratory data, and radiological reports.   Revisions included below.  Direct personal management at bed side and extensive interpretation of the data.  Plan was outlined and discussed in details with the housestaff.  Decision making of high complexity  Anemia secondary to acute blood loss, revision of EC fistula  patient is hemodynocamically stable  Adequate UO and renal function.  Follow Hb.  Pain meds.

## 2018-02-05 LAB
ALBUMIN SERPL ELPH-MCNC: 2.2 G/DL — LOW (ref 3.3–5)
ALBUMIN SERPL ELPH-MCNC: 2.3 G/DL — LOW (ref 3.3–5)
ALP SERPL-CCNC: 673 U/L — HIGH (ref 40–120)
ALP SERPL-CCNC: 710 U/L — HIGH (ref 40–120)
ALT FLD-CCNC: 49 U/L — HIGH (ref 10–45)
ALT FLD-CCNC: 53 U/L — HIGH (ref 10–45)
ANION GAP SERPL CALC-SCNC: 14 MMOL/L — SIGNIFICANT CHANGE UP (ref 5–17)
ANION GAP SERPL CALC-SCNC: 15 MMOL/L — SIGNIFICANT CHANGE UP (ref 5–17)
AST SERPL-CCNC: 54 U/L — HIGH (ref 10–40)
AST SERPL-CCNC: 56 U/L — HIGH (ref 10–40)
BILIRUB DIRECT SERPL-MCNC: 6.8 MG/DL — HIGH (ref 0–0.2)
BILIRUB INDIRECT FLD-MCNC: 1.5 MG/DL — HIGH (ref 0.2–1)
BILIRUB SERPL-MCNC: 8.3 MG/DL — HIGH (ref 0.2–1.2)
BILIRUB SERPL-MCNC: 8.3 MG/DL — HIGH (ref 0.2–1.2)
BUN SERPL-MCNC: 9 MG/DL — SIGNIFICANT CHANGE UP (ref 7–23)
BUN SERPL-MCNC: 9 MG/DL — SIGNIFICANT CHANGE UP (ref 7–23)
CALCIUM SERPL-MCNC: 8.2 MG/DL — LOW (ref 8.4–10.5)
CALCIUM SERPL-MCNC: 8.4 MG/DL — SIGNIFICANT CHANGE UP (ref 8.4–10.5)
CHLORIDE SERPL-SCNC: 100 MMOL/L — SIGNIFICANT CHANGE UP (ref 96–108)
CHLORIDE SERPL-SCNC: 101 MMOL/L — SIGNIFICANT CHANGE UP (ref 96–108)
CO2 SERPL-SCNC: 20 MMOL/L — LOW (ref 22–31)
CO2 SERPL-SCNC: 22 MMOL/L — SIGNIFICANT CHANGE UP (ref 22–31)
CREAT SERPL-MCNC: 0.88 MG/DL — SIGNIFICANT CHANGE UP (ref 0.5–1.3)
CREAT SERPL-MCNC: 0.88 MG/DL — SIGNIFICANT CHANGE UP (ref 0.5–1.3)
GLUCOSE BLDC GLUCOMTR-MCNC: 114 MG/DL — HIGH (ref 70–99)
GLUCOSE BLDC GLUCOMTR-MCNC: 61 MG/DL — LOW (ref 70–99)
GLUCOSE BLDC GLUCOMTR-MCNC: 73 MG/DL — SIGNIFICANT CHANGE UP (ref 70–99)
GLUCOSE BLDC GLUCOMTR-MCNC: 81 MG/DL — SIGNIFICANT CHANGE UP (ref 70–99)
GLUCOSE BLDC GLUCOMTR-MCNC: 83 MG/DL — SIGNIFICANT CHANGE UP (ref 70–99)
GLUCOSE BLDC GLUCOMTR-MCNC: 85 MG/DL — SIGNIFICANT CHANGE UP (ref 70–99)
GLUCOSE SERPL-MCNC: 77 MG/DL — SIGNIFICANT CHANGE UP (ref 70–99)
GLUCOSE SERPL-MCNC: 86 MG/DL — SIGNIFICANT CHANGE UP (ref 70–99)
HCT VFR BLD CALC: 22.3 % — LOW (ref 34.5–45)
HGB BLD-MCNC: 7.4 G/DL — LOW (ref 11.5–15.5)
MAGNESIUM SERPL-MCNC: 1.9 MG/DL — SIGNIFICANT CHANGE UP (ref 1.6–2.6)
MCHC RBC-ENTMCNC: 27.6 PG — SIGNIFICANT CHANGE UP (ref 27–34)
MCHC RBC-ENTMCNC: 33.2 G/DL — SIGNIFICANT CHANGE UP (ref 32–36)
MCV RBC AUTO: 83.2 FL — SIGNIFICANT CHANGE UP (ref 80–100)
PHOSPHATE SERPL-MCNC: 3.4 MG/DL — SIGNIFICANT CHANGE UP (ref 2.5–4.5)
PLATELET # BLD AUTO: 232 K/UL — SIGNIFICANT CHANGE UP (ref 150–400)
POTASSIUM SERPL-MCNC: 3.7 MMOL/L — SIGNIFICANT CHANGE UP (ref 3.5–5.3)
POTASSIUM SERPL-MCNC: 3.7 MMOL/L — SIGNIFICANT CHANGE UP (ref 3.5–5.3)
POTASSIUM SERPL-SCNC: 3.7 MMOL/L — SIGNIFICANT CHANGE UP (ref 3.5–5.3)
POTASSIUM SERPL-SCNC: 3.7 MMOL/L — SIGNIFICANT CHANGE UP (ref 3.5–5.3)
PROT SERPL-MCNC: 5.7 G/DL — LOW (ref 6–8.3)
PROT SERPL-MCNC: 6.2 G/DL — SIGNIFICANT CHANGE UP (ref 6–8.3)
RBC # BLD: 2.68 M/UL — LOW (ref 3.8–5.2)
RBC # FLD: 17 % — HIGH (ref 10.3–16.9)
SODIUM SERPL-SCNC: 135 MMOL/L — SIGNIFICANT CHANGE UP (ref 135–145)
SODIUM SERPL-SCNC: 137 MMOL/L — SIGNIFICANT CHANGE UP (ref 135–145)
SURGICAL PATHOLOGY STUDY: SIGNIFICANT CHANGE UP
WBC # BLD: 9.3 K/UL — SIGNIFICANT CHANGE UP (ref 3.8–10.5)
WBC # FLD AUTO: 9.3 K/UL — SIGNIFICANT CHANGE UP (ref 3.8–10.5)

## 2018-02-05 PROCEDURE — 74177 CT ABD & PELVIS W/CONTRAST: CPT | Mod: 26

## 2018-02-05 PROCEDURE — 74018 RADEX ABDOMEN 1 VIEW: CPT | Mod: 26

## 2018-02-05 PROCEDURE — 99233 SBSQ HOSP IP/OBS HIGH 50: CPT | Mod: GC

## 2018-02-05 RX ORDER — ACETAMINOPHEN 500 MG
1000 TABLET ORAL ONCE
Qty: 0 | Refills: 0 | Status: COMPLETED | OUTPATIENT
Start: 2018-02-05 | End: 2018-02-05

## 2018-02-05 RX ORDER — PANTOPRAZOLE SODIUM 20 MG/1
40 TABLET, DELAYED RELEASE ORAL
Qty: 0 | Refills: 0 | Status: DISCONTINUED | OUTPATIENT
Start: 2018-02-05 | End: 2018-02-17

## 2018-02-05 RX ORDER — MAGNESIUM SULFATE 500 MG/ML
1 VIAL (ML) INJECTION ONCE
Qty: 0 | Refills: 0 | Status: COMPLETED | OUTPATIENT
Start: 2018-02-05 | End: 2018-02-05

## 2018-02-05 RX ORDER — LABETALOL HCL 100 MG
5 TABLET ORAL ONCE
Qty: 0 | Refills: 0 | Status: COMPLETED | OUTPATIENT
Start: 2018-02-05 | End: 2018-02-05

## 2018-02-05 RX ORDER — LOSARTAN POTASSIUM 100 MG/1
75 TABLET, FILM COATED ORAL DAILY
Qty: 0 | Refills: 0 | Status: DISCONTINUED | OUTPATIENT
Start: 2018-02-06 | End: 2018-02-06

## 2018-02-05 RX ORDER — DEXTROSE 50 % IN WATER 50 %
50 SYRINGE (ML) INTRAVENOUS ONCE
Qty: 0 | Refills: 0 | Status: COMPLETED | OUTPATIENT
Start: 2018-02-05 | End: 2018-02-05

## 2018-02-05 RX ORDER — HYDRALAZINE HCL 50 MG
10 TABLET ORAL ONCE
Qty: 0 | Refills: 0 | Status: COMPLETED | OUTPATIENT
Start: 2018-02-05 | End: 2018-02-05

## 2018-02-05 RX ORDER — METOCLOPRAMIDE HCL 10 MG
5 TABLET ORAL EVERY 6 HOURS
Qty: 0 | Refills: 0 | Status: DISCONTINUED | OUTPATIENT
Start: 2018-02-05 | End: 2018-02-07

## 2018-02-05 RX ORDER — POTASSIUM CHLORIDE 20 MEQ
20 PACKET (EA) ORAL ONCE
Qty: 0 | Refills: 0 | Status: COMPLETED | OUTPATIENT
Start: 2018-02-05 | End: 2018-02-05

## 2018-02-05 RX ORDER — SODIUM CHLORIDE 9 MG/ML
1000 INJECTION, SOLUTION INTRAVENOUS
Qty: 0 | Refills: 0 | Status: DISCONTINUED | OUTPATIENT
Start: 2018-02-05 | End: 2018-02-06

## 2018-02-05 RX ADMIN — HEPARIN SODIUM 5000 UNIT(S): 5000 INJECTION INTRAVENOUS; SUBCUTANEOUS at 23:06

## 2018-02-05 RX ADMIN — Medication 400 MILLIGRAM(S): at 00:26

## 2018-02-05 RX ADMIN — Medication 400 MILLIGRAM(S): at 23:06

## 2018-02-05 RX ADMIN — SODIUM CHLORIDE 100 MILLILITER(S): 9 INJECTION INTRAMUSCULAR; INTRAVENOUS; SUBCUTANEOUS at 14:13

## 2018-02-05 RX ADMIN — Medication 100 GRAM(S): at 09:57

## 2018-02-05 RX ADMIN — Medication 5 MILLIGRAM(S): at 06:04

## 2018-02-05 RX ADMIN — Medication 10 MILLIGRAM(S): at 17:45

## 2018-02-05 RX ADMIN — Medication 1000 MILLIGRAM(S): at 06:36

## 2018-02-05 RX ADMIN — Medication 10 MILLIGRAM(S): at 18:00

## 2018-02-05 RX ADMIN — HEPARIN SODIUM 5000 UNIT(S): 5000 INJECTION INTRAVENOUS; SUBCUTANEOUS at 06:07

## 2018-02-05 RX ADMIN — Medication 10 MILLIGRAM(S): at 23:09

## 2018-02-05 RX ADMIN — PIPERACILLIN AND TAZOBACTAM 200 GRAM(S): 4; .5 INJECTION, POWDER, LYOPHILIZED, FOR SOLUTION INTRAVENOUS at 04:47

## 2018-02-05 RX ADMIN — Medication 50 MILLILITER(S): at 17:50

## 2018-02-05 RX ADMIN — Medication 20 MILLIEQUIVALENT(S): at 09:57

## 2018-02-05 RX ADMIN — Medication 1000 MILLIGRAM(S): at 01:04

## 2018-02-05 RX ADMIN — Medication 10 MILLIGRAM(S): at 00:18

## 2018-02-05 RX ADMIN — ONDANSETRON 4 MILLIGRAM(S): 8 TABLET, FILM COATED ORAL at 06:08

## 2018-02-05 RX ADMIN — Medication 400 MILLIGRAM(S): at 06:06

## 2018-02-05 RX ADMIN — LOSARTAN POTASSIUM 50 MILLIGRAM(S): 100 TABLET, FILM COATED ORAL at 06:09

## 2018-02-05 RX ADMIN — Medication 10 MILLIGRAM(S): at 13:49

## 2018-02-05 RX ADMIN — ONDANSETRON 4 MILLIGRAM(S): 8 TABLET, FILM COATED ORAL at 00:26

## 2018-02-05 NOTE — PROGRESS NOTE ADULT - ATTENDING COMMENTS
Seen and examined by me this morning. Noted to be jaundiced, LFT's with hyperbilirubinemia (increased direct BR) and elevated alk phos, f/up CT abd reading. Hypertensive, so increasing losartan to 75mg daily. Rest as above.

## 2018-02-05 NOTE — CONSULT NOTE ADULT - ASSESSMENT
fff 57 F s/p  esophagojejunostomy revision c/b EC fistula now s/p medical optimization of EC fistula and return to OR for EC fistula take down and subsequent revision of her aamir-en-y (1/30). Transferred back to SICU 2/5 for jaundice + bowel obstruction, risk of bowel perforation.     Neuro: acetaminophen PRN, zofran prn, restart Lexapro 20 and diazapam 5 qhs when not NPO  CV: normotensive goals, labetalol prn, restart losartan when not NPO. D5LR @ 100 cc/hr  Pulm: NC  FENGI: NPO, NGT to LCS, PPI, standing reglan, suppository/enemas  : Yu  Endo: ISS  ID: discontinued zosyn (1/30-2/6), melany (1/30-2/6), Vanc (1/30-1/31)   Ppx: SCDs, SQH  Lines: PICC  Wounds: midline wound with dry gauze dressing. SubQ CHECO x 2   Dispo: repeat CT and perc steven in AM

## 2018-02-05 NOTE — PROGRESS NOTE ADULT - SUBJECTIVE AND OBJECTIVE BOX
O/N: BP elevated, labetalol given x2, other VSS, cont to have nonbloody nonbilious emesis  2/4: Hgb stable poss sdu. cont abx until 2/6 as per ID n/v - may need tpn on monday. Pt with 2x small emesis, non bloody. IVF initially dc'd but restarted given the small emesis    STATUS POST:  7/24: SBR resection, fistula resection, revision of esophagogegunostomy drain through esophageal hiatus in R mediastinum  7/29: Ex-lap, SB anastamosis in BP limb breakdown with succus throughout abdomen  8/1: abd washout, drainage of abscess, biologic mesh placement, closure of abdominal wall, vac and retention suture  8/7: abd washout, mesh removal, frozen abd noted, LLQ CHECO replaced with benson drain  8/10: leak noted from previous anastamosis site on L side, mid abdomen malecot drain placed in enterotomy, JPs x 2 placed, necrotic fascia debrided, vicryl mesh sutured to fascia circumferentially, xeroform over mesh then vac dressing placed	  1/30: Ex-lap, resection of EC fistula, SBRx2 with patino handsewn anastomosis, JPx2 (SQ) placement, primary abdominal closure with b/l external oblique release and prevena placement.  3L Crystalloid, and had 550cc UOP and 500cc EBL. O/N: BP elevated, labetalol given x2, other VSS, cont to have nonbloody nonbilious emesis  2/4: Hgb stable poss sdu. cont abx until 2/6 as per ID n/v - may need tpn on monday. Pt with 2x small emesis, non bloody. IVF initially dc'd but restarted given the small emesis    STATUS POST:  7/24: SBR resection, fistula resection, revision of esophagogegunostomy drain through esophageal hiatus in R mediastinum  7/29: Ex-lap, SB anastamosis in BP limb breakdown with succus throughout abdomen  8/1: abd washout, drainage of abscess, biologic mesh placement, closure of abdominal wall, vac and retention suture  8/7: abd washout, mesh removal, frozen abd noted, LLQ CHCEO replaced with benson drain  8/10: leak noted from previous anastamosis site on L side, mid abdomen malecot drain placed in enterotomy, JPs x 2 placed, necrotic fascia debrided, vicryl mesh sutured to fascia circumferentially, xeroform over mesh then vac dressing placed	  1/30: Ex-lap, resection of EC fistula, SBRx2 with patino handsewn anastomosis, JPx2 (SQ) placement, primary abdominal closure with b/l external oblique release and prevena placement.  3L Crystalloid, and had 550cc UOP and 500cc EBL.      STATUS POST:       SUBJECTIVE: Patient seen and examined bedside by chief resident and surgical team. Patient complains of nausea this morning however, denies cp and sob.    heparin  flush 100 Units/mL Injectable 100 Unit(s) IV Push every other day  heparin  Injectable 5000 Unit(s) SubCutaneous every 8 hours  labetalol Injectable 10 milliGRAM(s) IV Push every 2 hours PRN  losartan 50 milliGRAM(s) Oral daily  micafungin IVPB 100 milliGRAM(s) IV Intermittent every 24 hours  piperacillin/tazobactam IVPB. 2.25 Gram(s) IV Intermittent every 6 hours      Vital Signs Last 24 Hrs  T(C): 36.7 (05 Feb 2018 06:00), Max: 37.2 (04 Feb 2018 09:46)  T(F): 98 (05 Feb 2018 06:00), Max: 99 (04 Feb 2018 22:21)  HR: 66 (05 Feb 2018 06:20) (66 - 80)  BP: 173/88 (05 Feb 2018 06:20) (144/77 - 200/106)  BP(mean): 124 (05 Feb 2018 06:20) (105 - 144)  RR: 17 (05 Feb 2018 06:20) (17 - 29)  SpO2: 92% (05 Feb 2018 06:20) (92% - 99%)  I&O's Detail    03 Feb 2018 07:01  -  04 Feb 2018 07:00  --------------------------------------------------------  IN:    Oral Fluid: 30 mL    Packed Red Blood Cells: 500 mL    sodium chloride 0.9%: 2100 mL    Solution: 499.6 mL    Solution: 200 mL    Solution: 400 mL  Total IN: 3729.6 mL    OUT:    Bulb: 110 mL    Bulb: 220 mL  Total OUT: 330 mL    Total NET: 3399.6 mL      04 Feb 2018 07:01  -  05 Feb 2018 06:38  --------------------------------------------------------  IN:    Oral Fluid: 1050 mL    sodium chloride 0.9%: 100 mL    sodium chloride 0.9%.: 1400 mL    Solution: 300 mL    Solution: 200 mL    Solution: 450 mL    Solution: 100 mL  Total IN: 3600 mL    OUT:    Bulb: 125 mL    Bulb: 45 mL  Total OUT: 170 mL    Total NET: 3430 mL      General: NAD, resting comfortably in bed  C/V: NSR  Pulm: Nonlabored breathing, no respiratory distress  Abd: soft, NT/ND. Provena vac in place and suctioning  Extrem: WWP, no edema, SCDs in place        LABS:                        7.6    10.6  )-----------( 218      ( 04 Feb 2018 05:19 )             22.9     02-04    139  |  101  |  11  ----------------------------<  72  3.4<L>   |  21<L>  |  0.91    Ca    8.3<L>      04 Feb 2018 05:24  Phos  2.6     02-04  Mg     1.8     02-04            RADIOLOGY & ADDITIONAL STUDIES:

## 2018-02-05 NOTE — PROGRESS NOTE ADULT - ASSESSMENT
57 F s/p  esophagojejunostomy revision c/b EC fistula now s/p medical optimization of EC fistula and return to OR for EC fistula take down and subsequent revision of her aamir-en-y (1/30)    Neuro: acetaminophen PRN, zofran prn, Lexapro 20 and diazapam 5 qhs  CV: MAP goal 65, labetalol prn  Pulm: NC  FENGI: CLD w/ ensure, NS@100  : Aimee  Endo: ISS  ID: zosyn (1/30-2/6), melany (1/30-2/6), /// discontinued Vanc (1/30-1/31)   Ppx: SCDs, SQH  Lines: PICC, R radial Radha  Wounds: midline wound with prevena Vac. SubQ CHECO x 2 57 F s/p  esophagojejunostomy revision c/b EC fistula now s/p medical optimization of EC fistula and return to OR for EC fistula take down and subsequent revision of her aamir-en-y (1/30)    Neuro: acetaminophen PRN, zofran prn, Lexapro 20 and diazapam 5 qhs  CV: MAP goal 65, labetalol prn goal is under 180  Pulm: NC  FENGI: CLD w/ ensure, NS@100  : Aimee  Endo: ISS  ID: zosyn (1/30-2/6), melany (1/30-2/6), /// discontinued Vanc (1/30-1/31)   Ppx: SCDs, SQH  Lines: PICC, R radial Radha  Wounds: midline wound with prevena Vac to perhaps be discontinued on 2/5. SubQ CHECO x 2   Nutrition for pos TPN

## 2018-02-05 NOTE — CONSULT NOTE ADULT - SUBJECTIVE AND OBJECTIVE BOX
HPI:  56F w/PMHx of HTN, gastric bypass in 2002 c/b stricture, corkscrewed sleeve and chronic leak, revised on 11/28/16 with protracted (70-day) postoperative course complicated by MDR infections, a C-diff infection, respiratory compromise due to plugging/PNA/effusions requiring multiple interventions and a trach, as well as a continued leak requiring a draining double-pigtail with stenting performed by GI. Pt had longstanding enterocutaneous fistula, which was resected on 1/30.    She was transferred back to SICU today for acute-onset jaundice and CT findings of small bowel dilatation (aamir limb, BP limb, and duodenum) with GB enlargement all resulting in hyperbilirubinemia.     Pt currently unable to give RoS due to difficulty arousing her.       PAST MEDICAL & SURGICAL HISTORY:  Reflux  Obesity  DVT (deep venous thrombosis): 2013 neck  Diverticulitis  Hypertension  Elective surgery: abodominal wall surgery  dec 2016  Elective surgery: NOV 2016 gastric bypass revision  Gastric bypass status for obesity: gastric sleeve, 6/2012  S/P breast biopsy: 2011, left  S/P colon resection: 2011  S/P knee surgery: repair 2009, 2011  right; left  Umbilical hernia: 2000  S/P appendectomy: 1975  S/P tonsillectomy: 1967      MEDICATIONS  (STANDING):  dextrose 5% + lactated ringers. 1000 milliLiter(s) (100 mL/Hr) IV Continuous <Continuous>  diazepam    Tablet 5 milliGRAM(s) Oral at bedtime  escitalopram 20 milliGRAM(s) Oral daily  heparin  flush 100 Units/mL Injectable 100 Unit(s) IV Push every other day  heparin  Injectable 5000 Unit(s) SubCutaneous every 8 hours  insulin lispro (HumaLOG) corrective regimen sliding scale   SubCutaneous Before meals and at bedtime  metoclopramide Injectable 5 milliGRAM(s) IV Push every 6 hours  pantoprazole  Injectable 40 milliGRAM(s) IV Push two times a day  sodium chloride 3%  Inhalation 4 milliLiter(s) Inhalation once    MEDICATIONS  (PRN):  labetalol Injectable 10 milliGRAM(s) IV Push every 2 hours PRN Systolic blood pressure >160      Allergies  sulfa drugs (Unknown)  sulfamethoxazole (Other)        ICU Vital Signs Last 24 Hrs  T(C): 36.8 (05 Feb 2018 14:00), Max: 37.3 (05 Feb 2018 10:00)  T(F): 98.3 (05 Feb 2018 14:00), Max: 99.2 (05 Feb 2018 10:00)  HR: 72 (05 Feb 2018 19:12) (66 - 77)  BP: 158/85 (05 Feb 2018 19:12) (144/77 - 200/106)  BP(mean): 112 (05 Feb 2018 19:12) (105 - 142)  ABP: --  ABP(mean): --  RR: 17 (05 Feb 2018 19:12) (17 - 22)  SpO2: 98% (05 Feb 2018 19:12) (90% - 98%)      I&O's Summary    04 Feb 2018 07:01  -  05 Feb 2018 07:00  --------------------------------------------------------  IN: 3600 mL / OUT: 170 mL / NET: 3430 mL    05 Feb 2018 07:01  -  05 Feb 2018 19:48  --------------------------------------------------------  IN: 1000 mL / OUT: 90 mL / NET: 910 mL        Yu:	  [ ] None	[ x] Daily Yu Order Placed	   Indication:	  [x ] Strict I and O's    [ ] Obstruction     [ ] Incontinence + Stage 3 or 4 Decubitus  Central Line:  [ ] None	   [x ]  Medication / TPN Administration     [ ] No Peripheral IV   Drips: D5LR @ 100 cc/hr    Physical Exam:  General: NAD  HEENT: NC/AT, EOMI, normal hearing  Pulmonary: Nonlabored breathing, no respiratory distress, CTA-B  Cardiovascular: NSR  Abdominal: distended, diffusely TTP, no guarding/rigidity/rebound tenderness, minimal to no bowel sounds, obese. CHECO drains in RLQ and LLQ, serosang. Midline wound c/d/i.   Extremities: WWP, moves extremities on command  Neuro: A/O x 2 to person and place, normal motor/sensation  Pulses: palpable pedal pulses    Lines/tubes/drains: NGT to low continuous suction, L IJ TLC        LABS:                        7.4    9.3   )-----------( 232      ( 05 Feb 2018 08:03 )             22.3     02-05    137  |  101  |  9   ----------------------------<  86  3.7   |  22  |  0.88    Ca    8.4      05 Feb 2018 12:01  Phos  3.4     02-05  Mg     1.9     02-05    TPro  6.2  /  Alb  2.3<L>  /  TBili  8.3<H>  /  DBili  x   /  AST  54<H>  /  ALT  53<H>  /  AlkPhos  710<H>  02-05        CAPILLARY BLOOD GLUCOSE      POCT Blood Glucose.: 114 mg/dL (05 Feb 2018 18:09)  POCT Blood Glucose.: 61 mg/dL (05 Feb 2018 17:36)  POCT Blood Glucose.: 85 mg/dL (05 Feb 2018 11:40)  POCT Blood Glucose.: 81 mg/dL (05 Feb 2018 06:15)  POCT Blood Glucose.: 73 mg/dL (05 Feb 2018 01:06)  POCT Blood Glucose.: 69 mg/dL (04 Feb 2018 23:36)  POCT Blood Glucose.: 67 mg/dL (04 Feb 2018 22:52)    LIVER FUNCTIONS - ( 05 Feb 2018 12:01 )  Alb: 2.3 g/dL / Pro: 6.2 g/dL / ALK PHOS: 710 U/L / ALT: 53 U/L / AST: 54 U/L / GGT: x               RADIOLOGY & ADDITIONAL STUDIES:  < from: CT Abdomen and Pelvis w/ IV Cont (02.05.18 @ 16:37) >  IMPRESSION: 1. Since 1/4/2018, there has been abdominal surgery. There is   dilatation of both the alimentary limb and the biliopancreatic limb of   small bowel, as well as of a short segment of the common channel proximal   to a transition point to nondilated distal small bowel. These findings   are consistent with small bowel obstruction, possibly due to adhesions.    2. Small bilateral pleural effusions and moderate bibasilar atelectasis.    3. Small ascites.    4. Postoperative changes anterior abdominal wall with possible   postoperative change/anasarca in both flanks.     5. Distended gallbladder.    < end of copied text >

## 2018-02-05 NOTE — CONSULT NOTE ADULT - ATTENDING COMMENTS
Pt readmitted to [facility] for monitoring and decompression of Gi tract. Cholecystostomy today per Surgery although Bili decreased with duodenal decompression. Will continue NPO and TPN ordered. No abx at this time per Surgery. Bowel regimen from below for constipation.

## 2018-02-05 NOTE — PROGRESS NOTE ADULT - SUBJECTIVE AND OBJECTIVE BOX
OVERNIGHT EVENTS: No acute events    SUBJECTIVE / INTERVAL HPI: Patient seen and examined at bedside. Having some nausea secondary to newly advanced diet to clear liquids. States her pain is well controlled at this time, only having nausea with some vomiting. Denies SOB, CP, abdominal pain.    VITAL SIGNS:  Vital Signs Last 24 Hrs  T(C): 37.3 (05 Feb 2018 10:00), Max: 37.3 (05 Feb 2018 10:00)  T(F): 99.2 (05 Feb 2018 10:00), Max: 99.2 (05 Feb 2018 10:00)  HR: 68 (05 Feb 2018 08:55) (66 - 76)  BP: 177/86 (05 Feb 2018 08:55) (144/77 - 200/106)  BP(mean): 124 (05 Feb 2018 08:55) (105 - 142)  RR: 18 (05 Feb 2018 08:55) (17 - 22)  SpO2: 90% (05 Feb 2018 08:55) (90% - 97%)    PHYSICAL EXAM:  Gen: resting in chair in mild distress secondary to nausea  HEENT: moist mucus membranes, EOMI, PERRLA, scleral icterus noted bilaterally  CV: regular rate and rhythm, +S1/S2, no murmurs  Resp: clear to auscultation bilaterally, no rales/rhonchi/wheezing  Abd: soft, 2 CHECO drains in lower extremity with serosanguinous fluid. Midline abdominal incision with dressing in place, CDI   Extr: warm and well perfused, trace pitting edema in bilateral lower extremities from ankles to below the knees  Neuro: alert and oriented to person, place and time; good fund of knowledge with appropriate affect; cranial nerves - EOMI, PERRLA, face symmetric, tongue midline; motor with good tone and bulk, sensory intact throughout    MEDICATIONS:  MEDICATIONS  (STANDING):  diazepam    Tablet 5 milliGRAM(s) Oral at bedtime  escitalopram 20 milliGRAM(s) Oral daily  heparin  flush 100 Units/mL Injectable 100 Unit(s) IV Push every other day  heparin  Injectable 5000 Unit(s) SubCutaneous every 8 hours  insulin lispro (HumaLOG) corrective regimen sliding scale   SubCutaneous Before meals and at bedtime  losartan 50 milliGRAM(s) Oral daily  sodium chloride 0.9%. 1000 milliLiter(s) (100 mL/Hr) IV Continuous <Continuous>  sodium chloride 3%  Inhalation 4 milliLiter(s) Inhalation once    MEDICATIONS  (PRN):  dextrose 50% Injectable 50 milliLiter(s) IV Push once PRN low blood sugar  labetalol Injectable 10 milliGRAM(s) IV Push every 2 hours PRN Systolic blood pressure >160      ALLERGIES:  Allergies    sulfa drugs (Unknown)  sulfamethoxazole (Other)    Intolerances        LABS:                        7.4    9.3   )-----------( 232      ( 05 Feb 2018 08:03 )             22.3     02-05    137  |  101  |  9   ----------------------------<  86  3.7   |  22  |  0.88    Ca    8.4      05 Feb 2018 12:01  Phos  3.4     02-05  Mg     1.9     02-05    TPro  6.2  /  Alb  2.3<L>  /  TBili  8.3<H>  /  DBili  x   /  AST  54<H>  /  ALT  53<H>  /  AlkPhos  710<H>  02-05    CAPILLARY BLOOD GLUCOSE  POCT Blood Glucose.: 85 mg/dL (05 Feb 2018 11:40)      RADIOLOGY & ADDITIONAL TESTS: Reviewed.      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 Seen by GI, with EGD revealing EJ junction with healed mucosa. Consulted for pre-operative clearance, now s/p repair of enterocutaneous fistulas with closure.   1. Entero-cutaneous fistula repair - now s/p repair and closure of fistulas. Patient currently reporting that pain is well controlled  -Followed by pain management, no opioid pain control at this time. f/u add'l recs  -DVT ppx with HSQ    2. Post-operative care - see above    3. HTN - prior to surgery, BP well controlled SBP 120s-130s, had been on Losartan 50mg daiy. Now hypertensive SBP 160s-200s, has been given Labetalol 10mg x 5 (over the past 24 hours) for better control  -At this time would recommend increasing Losartan dose to 75mg PO daily. HTN may be multifactorial from stress, pain, nausea. As this is better controlled Losartan dose can likely be lowered once again  -Can continue Labetalol 10mg PRN    4. Hyperbilirubinemia - clinically jaundice appearing on examination, lab work from 2/1 without LFT abnormalities.    5. Anemia - Hb 7-8, appears clinically stable at this time  -Continue to monitor for active bleeding, maintain active T&S    6. Anxiety - clinically stable at this time, begin followed b Psychiatry   -Continue Lexapro 20mg PO daily    Case discussed with Dr Euceda, will continue to follow with you OVERNIGHT EVENTS: No acute events    SUBJECTIVE / INTERVAL HPI: Patient seen and examined at bedside. Having some nausea secondary to newly advanced diet to clear liquids. States her pain is well controlled at this time, only having nausea with some vomiting. Denies SOB, CP, abdominal pain.    VITAL SIGNS:  Vital Signs Last 24 Hrs  T(C): 37.3 (05 Feb 2018 10:00), Max: 37.3 (05 Feb 2018 10:00)  T(F): 99.2 (05 Feb 2018 10:00), Max: 99.2 (05 Feb 2018 10:00)  HR: 68 (05 Feb 2018 08:55) (66 - 76)  BP: 177/86 (05 Feb 2018 08:55) (144/77 - 200/106)  BP(mean): 124 (05 Feb 2018 08:55) (105 - 142)  RR: 18 (05 Feb 2018 08:55) (17 - 22)  SpO2: 90% (05 Feb 2018 08:55) (90% - 97%)    PHYSICAL EXAM:  Gen: resting in chair in mild distress secondary to nausea  HEENT: moist mucus membranes, EOMI, PERRLA, scleral icterus noted bilaterally  CV: regular rate and rhythm, +S1/S2, no murmurs  Resp: clear to auscultation bilaterally, no rales/rhonchi/wheezing  Abd: soft, 2 CHECO drains in lower extremity with serosanguinous fluid. Midline abdominal incision with dressing in place, CDI   Extr: warm and well perfused, trace pitting edema in bilateral lower extremities from ankles to below the knees  Neuro: alert and oriented to person, place and time; good fund of knowledge with appropriate affect; cranial nerves - EOMI, PERRLA, face symmetric, tongue midline; motor with good tone and bulk, sensory intact throughout    MEDICATIONS:  MEDICATIONS  (STANDING):  diazepam    Tablet 5 milliGRAM(s) Oral at bedtime  escitalopram 20 milliGRAM(s) Oral daily  heparin  flush 100 Units/mL Injectable 100 Unit(s) IV Push every other day  heparin  Injectable 5000 Unit(s) SubCutaneous every 8 hours  insulin lispro (HumaLOG) corrective regimen sliding scale   SubCutaneous Before meals and at bedtime  losartan 50 milliGRAM(s) Oral daily  sodium chloride 0.9%. 1000 milliLiter(s) (100 mL/Hr) IV Continuous <Continuous>  sodium chloride 3%  Inhalation 4 milliLiter(s) Inhalation once    MEDICATIONS  (PRN):  dextrose 50% Injectable 50 milliLiter(s) IV Push once PRN low blood sugar  labetalol Injectable 10 milliGRAM(s) IV Push every 2 hours PRN Systolic blood pressure >160      ALLERGIES:  Allergies    sulfa drugs (Unknown)  sulfamethoxazole (Other)    Intolerances        LABS:                        7.4    9.3   )-----------( 232      ( 05 Feb 2018 08:03 )             22.3     02-05    137  |  101  |  9   ----------------------------<  86  3.7   |  22  |  0.88    Ca    8.4      05 Feb 2018 12:01  Phos  3.4     02-05  Mg     1.9     02-05    TPro  6.2  /  Alb  2.3<L>  /  TBili  8.3<H>  /  DBili  x   /  AST  54<H>  /  ALT  53<H>  /  AlkPhos  710<H>  02-05    CAPILLARY BLOOD GLUCOSE  POCT Blood Glucose.: 85 mg/dL (05 Feb 2018 11:40)      RADIOLOGY & ADDITIONAL TESTS: Reviewed.      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 Seen by GI, with EGD revealing EJ junction with healed mucosa. Consulted for pre-operative clearance, now s/p repair of enterocutaneous fistulas with closure.   1. Entero-cutaneous fistula repair - now s/p repair and closure of fistulas. Patient currently reporting that pain is well controlled  -Followed by pain management, no opioid pain control at this time. f/u add'l recs  -DVT ppx with HSQ    2. Post-operative care - see above    3. HTN - prior to surgery, BP well controlled SBP 120s-130s, had been on Losartan 50mg daiy. Now hypertensive SBP 160s-200s, has been given Labetalol 10mg x 5 (over the past 24 hours) for better control  -At this time would recommend increasing Losartan dose to 75mg PO daily. HTN may be multifactorial from stress, pain, nausea. As this is better controlled Losartan dose can likely be lowered once again  -Can continue Labetalol 10mg PRN    4. Hyperbilirubinemia - clinically jaundice appearing on examination, lab work from 2/1 without LFT abnormalities  -Urgent CT abdomen and pelvis ordered    5. Anemia - Hb 7-8, appears clinically stable at this time  -Continue to monitor for active bleeding, maintain active T&S    6. Anxiety - clinically stable at this time, begin followed b Psychiatry   -Continue Lexapro 20mg PO daily    Case discussed with Dr Euceda, will continue to follow with you OVERNIGHT EVENTS: No acute events    SUBJECTIVE / INTERVAL HPI: Patient seen and examined at bedside. Having some nausea secondary to newly advanced diet to clear liquids. States her pain is well controlled at this time, only having nausea with some vomiting. Denies SOB, CP, abdominal pain.    VITAL SIGNS:  Vital Signs Last 24 Hrs  T(C): 37.3 (05 Feb 2018 10:00), Max: 37.3 (05 Feb 2018 10:00)  T(F): 99.2 (05 Feb 2018 10:00), Max: 99.2 (05 Feb 2018 10:00)  HR: 68 (05 Feb 2018 08:55) (66 - 76)  BP: 177/86 (05 Feb 2018 08:55) (144/77 - 200/106)  BP(mean): 124 (05 Feb 2018 08:55) (105 - 142)  RR: 18 (05 Feb 2018 08:55) (17 - 22)  SpO2: 90% (05 Feb 2018 08:55) (90% - 97%)    PHYSICAL EXAM:  Gen: resting in chair in mild distress secondary to nausea  HEENT: moist mucus membranes, EOMI, PERRLA, scleral icterus noted bilaterally  CV: regular rate and rhythm, +S1/S2, no murmurs  Resp: clear to auscultation bilaterally, no rales/rhonchi/wheezing  Abd: soft, 2 CHECO drains in lower extremity with serosanguinous fluid. Midline abdominal incision with dressing in place, CDI   Extr: warm and well perfused, trace pitting edema in bilateral lower extremities from ankles to below the knees  Neuro: alert and oriented to person, place and time; good fund of knowledge with appropriate affect; cranial nerves - EOMI, PERRLA, face symmetric, tongue midline; motor with good tone and bulk, sensory intact throughout    MEDICATIONS:  MEDICATIONS  (STANDING):  diazepam    Tablet 5 milliGRAM(s) Oral at bedtime  escitalopram 20 milliGRAM(s) Oral daily  heparin  flush 100 Units/mL Injectable 100 Unit(s) IV Push every other day  heparin  Injectable 5000 Unit(s) SubCutaneous every 8 hours  insulin lispro (HumaLOG) corrective regimen sliding scale   SubCutaneous Before meals and at bedtime  losartan 50 milliGRAM(s) Oral daily  sodium chloride 0.9%. 1000 milliLiter(s) (100 mL/Hr) IV Continuous <Continuous>  sodium chloride 3%  Inhalation 4 milliLiter(s) Inhalation once    MEDICATIONS  (PRN):  dextrose 50% Injectable 50 milliLiter(s) IV Push once PRN low blood sugar  labetalol Injectable 10 milliGRAM(s) IV Push every 2 hours PRN Systolic blood pressure >160      ALLERGIES:  Allergies    sulfa drugs (Unknown)  sulfamethoxazole (Other)    Intolerances        LABS:                        7.4    9.3   )-----------( 232      ( 05 Feb 2018 08:03 )             22.3     02-05    137  |  101  |  9   ----------------------------<  86  3.7   |  22  |  0.88    Ca    8.4      05 Feb 2018 12:01  Phos  3.4     02-05  Mg     1.9     02-05    TPro  6.2  /  Alb  2.3<L>  /  TBili  8.3<H>  /  DBili  x   /  AST  54<H>  /  ALT  53<H>  /  AlkPhos  710<H>  02-05    CAPILLARY BLOOD GLUCOSE  POCT Blood Glucose.: 85 mg/dL (05 Feb 2018 11:40)      RADIOLOGY & ADDITIONAL TESTS: Reviewed.      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 Seen by GI, with EGD revealing EJ junction with healed mucosa. Consulted for pre-operative clearance, now s/p repair of enterocutaneous fistulas with closure.   1. Entero-cutaneous fistula repair - now s/p repair and closure of fistulas. Patient currently reporting that pain is well controlled  -Followed by pain management, no opioid pain control at this time. f/u add'l recs  -DVT ppx with HSQ    2. Post-operative care - see above    3. HTN - prior to surgery, BP well controlled SBP 120s-130s, had been on Losartan 50mg daiy. Now hypertensive SBP 160s-200s, has been given Labetalol 10mg x 5 (over the past 24 hours) for better control  -Increased Losartan dose to 75mg PO daily. HTN may be multifactorial from stress, pain, nausea. As this is better controlled Losartan dose can likely be lowered once again  -Can continue Labetalol 10mg PRN    4. Hyperbilirubinemia - clinically jaundice appearing on examination, lab work from 2/1 without LFT abnormalities  -Urgent CT abdomen and pelvis ordered    5. Anemia - Hb 7-8, appears clinically stable at this time  -Continue to monitor for active bleeding, maintain active T&S    6. Anxiety - clinically stable at this time, begin followed b Psychiatry   -Continue Lexapro 20mg PO daily    Case discussed with Dr Euceda, will continue to follow with you

## 2018-02-05 NOTE — PROGRESS NOTE ADULT - SUBJECTIVE AND OBJECTIVE BOX
NEUROSURGERY PAIN MANAGEMENT PROGRESS NOTE    OVERNIGHT EVENTS:  - No acute overnight events.   Aspartate Aminotransferase (AST/SGOT): 25 U/L (02.01.18 @ 05:21)  Alanine Aminotransferase (ALT/SGPT): 26 U/L (02.01.18 @ 05:21)  - Dcd IV Dilaudid o/n 2/2. Pt groggy/drowsy requesting IVP medications.   - Pt tolerating dc of opiates.     PLAN/RECOMMENDATIONS:  - Would not recommend resumption of opioids. Pt in NAD. Possible etiology of pain r/t multiple re-ops/likely adhesions. But as of this weekend, tolerating ADLs and procedures w/o pain regimen and only Tylenol. Would recommend not restarting opiates if pt tolerating w/o opiates d/t hx of impulsivity. If acute change in pain status, pt would be best suited for abuse deterrent opiates.   - IV Tylenol for breakthrough    CALL NP: [phone] for any acute changes in pain, significant difficulties with activity, or side effects/adverse effects with pain medications  CALL Dr Tony Julio for weekend call: [phone]    REVIEW OF SYSTEMS:  CONSTITUTIONAL: fever or fatigue O/N.   NECK/ENT: No pain or stiffness. No throat pain  RESPIRATORY: No cough, wheezing; No shortness of breath  CARDIOVASCULAR: No chest pain, palpitations.   GASTROINTESTINAL: Per surgery note this AM, pt reporting nausea. On exam @ 9am, denied nausea. Denies BM, denies passing gas. Some slight abdominal discomfort, but states she is comfortable No vomiting.   NEUROLOGICAL: No headaches, gen. loss of strength, no numbness, or tremors. No dizziness or lightheadedness since pain medications were dcd.     FUNCTIONAL ASSESSMENT:  PAIN SCORE AT REST:   0/10      SCALE USED: (1-10 VAS)  PAIN SCORE WITH ACTIVITY:    Not assessed     SCALE USED: (1-10 VAS)    PAIN ASSESSMENT:  See above.    PHYSICAL EXAM  GENERAL: NAD  NERVOUS SYSTEM:    Alert & Oriented X3, Good concentration;   Cranial nerves grossly intact  Motor exam:  KELLEY x 4, 5/5 in 4 extr.   Sensation intact to LT in UE/LE in 3 dermatomes  ABDOMEN: Softly distended, compressible. No pain to LT, some tenderness to DP; Hypoactive BS. Incision covered with dressing, paper tape. CHECO drain++.  EXTREMITIES:  2+ Peripheral Pulses, No clubbing, cyanosis, or edema  SKIN: No rashes or lesions.     ASSESSMENT:   57 F s/p  esophagojejunostomy revision c/b EC fistula now s/p medical optimization of EC fistula and return to OR for EC fistula take down and subsequent revision of her aamir-en-y (1/30) c/o slight abdominal pain, improved over weekend with dc of wound vac    RECOMMENDATIONS:  1. Opioids  Since yesterday 6am, pt has required: no opioids. Dcd over weekend d/t PLAN: Pt in NAD, denies acute pain. See above recommendations.    2. Neuropathics: Pt currently denies neuropathic pain. No numbness/tingling/electric shock like sensation in LE/UE b.l. PLAN:  No indication for neuropathic agents.     3. Adjuvants: Pt not complaining of muscle spasms/cramping in neck/back. Tylenol 650mg q6h PRN for Mild Pain. Pt not on Percocet. PLAN: C/w IV Tylenol for breakthrough. Valium per Psych     4. Prophylactic:   Bowel regimen: Docusate Senna;   Nausea PRN: Pt does not c/o current    5. Functional Goals: Pt will get OOB with PT today. Pt will resume previous level of activity without impairment from surgery.     6. Additional Consults: None recommended.     7. Additional Labs/Imaging: None recommended.     8. Follow up, Discharge Planning:   Patient is set for discharge to: Pending dc drain  Discharge is pending: Pending dc drain  Pain Management follow up plan: F/u inpatient. NEUROSURGERY PAIN MANAGEMENT PROGRESS NOTE    OVERNIGHT EVENTS:  - No acute overnight events.   Aspartate Aminotransferase (AST/SGOT): 25 U/L (02.01.18 @ 05:21)  Alanine Aminotransferase (ALT/SGPT): 26 U/L (02.01.18 @ 05:21)  - Dcd IV Dilaudid o/n 2/2. Pt groggy/drowsy requesting IVP medications.   - Pt tolerating dc of opiates.     PLAN/RECOMMENDATIONS:  - Would not recommend resumption of opioids. Pt in NAD. Possible etiology of pain r/t multiple re-ops/likely adhesions. But as of this weekend, tolerating ADLs and procedures w/o pain regimen and only Tylenol. Would recommend not restarting opiates if pt tolerating w/o opiates d/t hx of impulsivity. If acute change in pain status, pt would be best suited for abuse deterrent opiates.   - IV Tylenol for breakthrough  - Will sign off at this point, reconsult for any acute changes in pain, significant difficulties with activity, or side effects/adverse effects with pain regimen. [phone]    Vital Signs Last 24 Hrs  T(C): 36.7 (06 Feb 2018 09:02), Max: 37.3 (05 Feb 2018 10:00)  T(F): 98.1 (06 Feb 2018 09:02), Max: 99.2 (05 Feb 2018 10:00)  HR: 74 (06 Feb 2018 08:00) (70 - 80)  BP: 159/90 (06 Feb 2018 08:00) (122/69 - 189/88)  BP(mean): 109 (06 Feb 2018 08:00) (87 - 135)  RR: 22 (06 Feb 2018 08:00) (15 - 32)  SpO2: 98% (06 Feb 2018 08:00) (92% - 98%)    REVIEW OF SYSTEMS:  CONSTITUTIONAL: No fever or fatigue O/N.   NECK/ENT: No pain or stiffness. No throat pain  RESPIRATORY: No cough, wheezing; No shortness of breath  CARDIOVASCULAR: No chest pain, palpitations.   GASTROINTESTINAL: Per surgery note this AM, pt reporting nausea. On exam @ 9am, denied nausea. Denies BM, denies passing gas. Some slight abdominal discomfort, but states she is comfortable No vomiting.   NEUROLOGICAL: No headaches, gen. loss of strength, no numbness, or tremors. No dizziness or lightheadedness since pain medications were dcd.     FUNCTIONAL ASSESSMENT:  PAIN SCORE AT REST:   0/10      SCALE USED: (1-10 VAS)  PAIN SCORE WITH ACTIVITY:    Not assessed     SCALE USED: (1-10 VAS)    PAIN ASSESSMENT:  See above.    PHYSICAL EXAM  GENERAL: NAD  NERVOUS SYSTEM:    Alert & Oriented X3, Good concentration;   Cranial nerves grossly intact  Motor exam:  KELLEY x 4, 5/5 in 4 extr.   Sensation intact to LT in UE/LE in 3 dermatomes  ABDOMEN: Softly distended, compressible. No pain to LT, some tenderness to DP; Hypoactive BS. Incision covered with dressing, paper tape. CHECO drain++.  EXTREMITIES:  2+ Peripheral Pulses, No clubbing, cyanosis, or edema  SKIN: No rashes or lesions.     ASSESSMENT:   57 F s/p  esophagojejunostomy revision c/b EC fistula now s/p medical optimization of EC fistula and return to OR for EC fistula take down and subsequent revision of her aamir-en-y (1/30) c/o slight abdominal pain, improved over weekend with dc of wound vac    RECOMMENDATIONS:  1. Opioids  Since yesterday 6am, pt has required: no opioids. Dcd over weekend d/t PLAN: Pt in NAD, denies acute pain. See above recommendations.    2. Neuropathics: Pt currently denies neuropathic pain. No numbness/tingling/electric shock like sensation in LE/UE b.l. PLAN:  No indication for neuropathic agents.     3. Adjuvants: Pt not complaining of muscle spasms/cramping in neck/back. Tylenol 650mg q6h PRN for Mild Pain. Pt not on Percocet. PLAN: C/w IV Tylenol for breakthrough. Valium per Psych     4. Prophylactic:   Bowel regimen: Docusate Senna;   Nausea PRN: Pt does not c/o current    5. Functional Goals: Pt will get OOB with PT today. Pt will resume previous level of activity without impairment from surgery.     6. Additional Consults: None recommended.     7. Additional Labs/Imaging: None recommended.     8. Follow up, Discharge Planning:   Patient is set for discharge to: Pending dc drain  Discharge is pending: Pending dc drain  Pain Management follow up plan: Sign off.

## 2018-02-05 NOTE — PROGRESS NOTE ADULT - SUBJECTIVE AND OBJECTIVE BOX
Today patient was noted to be jaundiced  as result stat ct scan done  shows dilation of the small bowel both the aamir limb and the BP limb and duodenum  there is no ductal dilation  gallbladder distended but no dilation of cbd or hepatic ducts thus do not think requires decompression at this point  reviewed with dr alexandra and it appears that there may be a transition point that is distal to the anastomosis  but also has all the contrast in place from ct scan from 1/4 in both colon and distal intestine  believe that there can be early adhesion but think best treatment is to sit and suction  will start tpn  I placed ngt and got pressured fluid back that was dark but not truly bilious  think that a major component is intestinal failure or not have bowel activity  Patient has no pain or feels like under pressure  no collections on ct scan  will start reglan  give enemas from below to try to stimulate  repeat ct tomorrow to make sure bp limb is not worsening  also send to icu  discussed with dr dickinson to get his input

## 2018-02-06 LAB
ALBUMIN SERPL ELPH-MCNC: 2 G/DL — LOW (ref 3.3–5)
ALP SERPL-CCNC: 575 U/L — HIGH (ref 40–120)
ALT FLD-CCNC: 43 U/L — SIGNIFICANT CHANGE UP (ref 10–45)
ANION GAP SERPL CALC-SCNC: 11 MMOL/L — SIGNIFICANT CHANGE UP (ref 5–17)
APTT BLD: 35.9 SEC — SIGNIFICANT CHANGE UP (ref 27.5–37.4)
AST SERPL-CCNC: 40 U/L — SIGNIFICANT CHANGE UP (ref 10–40)
BILIRUB DIRECT SERPL-MCNC: 4.2 MG/DL — HIGH (ref 0–0.2)
BILIRUB SERPL-MCNC: 5.3 MG/DL — HIGH (ref 0.2–1.2)
BLD GP AB SCN SERPL QL: NEGATIVE — SIGNIFICANT CHANGE UP
BUN SERPL-MCNC: 9 MG/DL — SIGNIFICANT CHANGE UP (ref 7–23)
CALCIUM SERPL-MCNC: 8.1 MG/DL — LOW (ref 8.4–10.5)
CHLORIDE SERPL-SCNC: 105 MMOL/L — SIGNIFICANT CHANGE UP (ref 96–108)
CO2 SERPL-SCNC: 21 MMOL/L — LOW (ref 22–31)
CREAT SERPL-MCNC: 0.94 MG/DL — SIGNIFICANT CHANGE UP (ref 0.5–1.3)
GLUCOSE BLDC GLUCOMTR-MCNC: 100 MG/DL — HIGH (ref 70–99)
GLUCOSE BLDC GLUCOMTR-MCNC: 101 MG/DL — HIGH (ref 70–99)
GLUCOSE BLDC GLUCOMTR-MCNC: 103 MG/DL — HIGH (ref 70–99)
GLUCOSE BLDC GLUCOMTR-MCNC: 96 MG/DL — SIGNIFICANT CHANGE UP (ref 70–99)
GLUCOSE SERPL-MCNC: 104 MG/DL — HIGH (ref 70–99)
GRAM STN FLD: SIGNIFICANT CHANGE UP
HCT VFR BLD CALC: 22.5 % — LOW (ref 34.5–45)
HGB BLD-MCNC: 7.2 G/DL — LOW (ref 11.5–15.5)
INR BLD: 1.42 — HIGH (ref 0.88–1.16)
LACTATE SERPL-SCNC: 1 MMOL/L — SIGNIFICANT CHANGE UP (ref 0.5–2)
MAGNESIUM SERPL-MCNC: 1.8 MG/DL — SIGNIFICANT CHANGE UP (ref 1.6–2.6)
MCHC RBC-ENTMCNC: 26.8 PG — LOW (ref 27–34)
MCHC RBC-ENTMCNC: 32 G/DL — SIGNIFICANT CHANGE UP (ref 32–36)
MCV RBC AUTO: 83.6 FL — SIGNIFICANT CHANGE UP (ref 80–100)
PHOSPHATE SERPL-MCNC: 3.1 MG/DL — SIGNIFICANT CHANGE UP (ref 2.5–4.5)
PLATELET # BLD AUTO: 267 K/UL — SIGNIFICANT CHANGE UP (ref 150–400)
POTASSIUM SERPL-MCNC: 3.2 MMOL/L — LOW (ref 3.5–5.3)
POTASSIUM SERPL-SCNC: 3.2 MMOL/L — LOW (ref 3.5–5.3)
PROT SERPL-MCNC: 5.6 G/DL — LOW (ref 6–8.3)
PROTHROM AB SERPL-ACNC: 15.9 SEC — HIGH (ref 9.8–12.7)
RBC # BLD: 2.69 M/UL — LOW (ref 3.8–5.2)
RBC # FLD: 17.3 % — HIGH (ref 10.3–16.9)
RH IG SCN BLD-IMP: POSITIVE — SIGNIFICANT CHANGE UP
SODIUM SERPL-SCNC: 137 MMOL/L — SIGNIFICANT CHANGE UP (ref 135–145)
SPECIMEN SOURCE: SIGNIFICANT CHANGE UP
WBC # BLD: 7.3 K/UL — SIGNIFICANT CHANGE UP (ref 3.8–10.5)
WBC # FLD AUTO: 7.3 K/UL — SIGNIFICANT CHANGE UP (ref 3.8–10.5)

## 2018-02-06 PROCEDURE — 74176 CT ABD & PELVIS W/O CONTRAST: CPT | Mod: 26

## 2018-02-06 PROCEDURE — 47490 INCISION OF GALLBLADDER: CPT

## 2018-02-06 RX ORDER — ACETAMINOPHEN 500 MG
1000 TABLET ORAL ONCE
Qty: 0 | Refills: 0 | Status: COMPLETED | OUTPATIENT
Start: 2018-02-06 | End: 2018-02-06

## 2018-02-06 RX ORDER — SODIUM CHLORIDE 9 MG/ML
1000 INJECTION, SOLUTION INTRAVENOUS
Qty: 0 | Refills: 0 | Status: DISCONTINUED | OUTPATIENT
Start: 2018-02-06 | End: 2018-02-06

## 2018-02-06 RX ORDER — MAGNESIUM SULFATE 500 MG/ML
1 VIAL (ML) INJECTION ONCE
Qty: 0 | Refills: 0 | Status: COMPLETED | OUTPATIENT
Start: 2018-02-06 | End: 2018-02-06

## 2018-02-06 RX ORDER — ELECTROLYTE SOLUTION,INJ
1 VIAL (ML) INTRAVENOUS
Qty: 0 | Refills: 0 | Status: DISCONTINUED | OUTPATIENT
Start: 2018-02-06 | End: 2018-02-06

## 2018-02-06 RX ORDER — POTASSIUM CHLORIDE 20 MEQ
10 PACKET (EA) ORAL
Qty: 0 | Refills: 0 | Status: COMPLETED | OUTPATIENT
Start: 2018-02-06 | End: 2018-02-06

## 2018-02-06 RX ORDER — I.V. FAT EMULSION 20 G/100ML
0.7 EMULSION INTRAVENOUS
Qty: 50 | Refills: 0 | Status: DISCONTINUED | OUTPATIENT
Start: 2018-02-06 | End: 2018-02-06

## 2018-02-06 RX ADMIN — HEPARIN SODIUM 5000 UNIT(S): 5000 INJECTION INTRAVENOUS; SUBCUTANEOUS at 07:37

## 2018-02-06 RX ADMIN — Medication 100 MILLIEQUIVALENT(S): at 16:41

## 2018-02-06 RX ADMIN — Medication 5 MILLIGRAM(S): at 18:18

## 2018-02-06 RX ADMIN — Medication 100 UNIT(S): at 16:41

## 2018-02-06 RX ADMIN — Medication 400 MILLIGRAM(S): at 05:45

## 2018-02-06 RX ADMIN — I.V. FAT EMULSION 20.56 GM/KG/DAY: 20 EMULSION INTRAVENOUS at 22:19

## 2018-02-06 RX ADMIN — Medication 5 MILLIGRAM(S): at 07:37

## 2018-02-06 RX ADMIN — Medication 1000 MILLIGRAM(S): at 22:25

## 2018-02-06 RX ADMIN — Medication 1000 MILLIGRAM(S): at 00:14

## 2018-02-06 RX ADMIN — PANTOPRAZOLE SODIUM 40 MILLIGRAM(S): 20 TABLET, DELAYED RELEASE ORAL at 18:18

## 2018-02-06 RX ADMIN — Medication 1000 MILLIGRAM(S): at 11:30

## 2018-02-06 RX ADMIN — Medication 1 EACH: at 18:19

## 2018-02-06 RX ADMIN — PANTOPRAZOLE SODIUM 40 MILLIGRAM(S): 20 TABLET, DELAYED RELEASE ORAL at 07:36

## 2018-02-06 RX ADMIN — Medication 1000 MILLIGRAM(S): at 06:13

## 2018-02-06 RX ADMIN — HEPARIN SODIUM 5000 UNIT(S): 5000 INJECTION INTRAVENOUS; SUBCUTANEOUS at 21:39

## 2018-02-06 RX ADMIN — Medication 400 MILLIGRAM(S): at 22:19

## 2018-02-06 RX ADMIN — Medication 10 MILLIGRAM(S): at 21:38

## 2018-02-06 RX ADMIN — Medication 100 MILLIEQUIVALENT(S): at 11:18

## 2018-02-06 RX ADMIN — Medication 10 MILLIGRAM(S): at 11:38

## 2018-02-06 RX ADMIN — Medication 100 MILLIEQUIVALENT(S): at 18:19

## 2018-02-06 RX ADMIN — Medication 400 MILLIGRAM(S): at 11:18

## 2018-02-06 RX ADMIN — HEPARIN SODIUM 5000 UNIT(S): 5000 INJECTION INTRAVENOUS; SUBCUTANEOUS at 16:42

## 2018-02-06 RX ADMIN — Medication 5 MILLIGRAM(S): at 00:25

## 2018-02-06 RX ADMIN — Medication 100 GRAM(S): at 11:19

## 2018-02-06 NOTE — PROGRESS NOTE ADULT - ASSESSMENT
57 F s/p  esophagojejunostomy revision c/b EC fistula now s/p medical optimization of EC fistula and return to OR for EC fistula take down and subsequent revision of her aamir-en-y (1/30). Transferred back to SICU 2/5 for jaundice + bowel obstruction, risk of bowel perforation.     Neuro: acetaminophen PRN, zofran prn, diazapam 5 qhs  CV: normotensive goals  Pulm: NC  FENGI: NPO, NGT to LCS, PPI, reglan, supp/enemas  : Yu  Endo: ISS  ID: discontinued zosyn (1/30-2/6), melany (1/30-2/6), Vanc (1/30-1/31)   Ppx: SCDs, SQH  Lines: L IJ (1/.31-)  Wounds: midline wound with dry gauze dressing. SubQ CHECO x 2, cholecystostomy (2/6-)

## 2018-02-06 NOTE — PROGRESS NOTE ADULT - SUBJECTIVE AND OBJECTIVE BOX
24 hr events:    SUBJECTIVE:    Flatus: [ ] YES [ ] NO             Bowel Movement: [ ] YES [ ] NO  Pain (0-10):            Pain Control Adequate: [ ] YES [ ] NO  Nausea: [ ] YES [ ] NO            Vomiting: [ ] YES [ ] NO  Diarrhea: [ ] YES [ ] NO         Constipation: [ ] YES [ ] NO     Chest Pain: [ ] YES [ ] NO    SOB:  [ ] YES [ ] NO    MEDICATIONS  (STANDING):  bisacodyl Suppository 10 milliGRAM(s) Rectal daily  dextrose 5% + lactated ringers. 1000 milliLiter(s) (120 mL/Hr) IV Continuous <Continuous>  diazepam  Injectable 5 milliGRAM(s) IV Push at bedtime  fat emulsion (Plant Based) 20% Infusion 0.7 Gm/kG/Day (20.556 mL/Hr) IV Continuous <Continuous>  heparin  flush 100 Units/mL Injectable 100 Unit(s) IV Push every other day  heparin  Injectable 5000 Unit(s) SubCutaneous every 8 hours  insulin lispro (HumaLOG) corrective regimen sliding scale   SubCutaneous Before meals and at bedtime  metoclopramide Injectable 5 milliGRAM(s) IV Push every 6 hours  pantoprazole  Injectable 40 milliGRAM(s) IV Push two times a day  Parenteral Nutrition - Adult 1 Each (63 mL/Hr) TPN Continuous <Continuous>  potassium chloride  10 mEq/100 mL IVPB 10 milliEquivalent(s) IV Intermittent every 1 hour  sodium chloride 3%  Inhalation 4 milliLiter(s) Inhalation once    MEDICATIONS  (PRN):  labetalol Injectable 10 milliGRAM(s) IV Push every 2 hours PRN Systolic blood pressure >160      Yu:	  [ ] None	[ ] Daily Yu Order Placed	   Indication:	  [ ] Strict I and O's    [ ] Obstruction     [ ] Incontinence + Stage 3 or 4 Decubitus  Central Line:  [ ] None	   [ ]  Medication / TPN Administration     Drips:     ICU Vital Signs Last 24 Hrs  T(C): 36.7 (06 Feb 2018 09:02), Max: 37.1 (05 Feb 2018 21:49)  T(F): 98.1 (06 Feb 2018 09:02), Max: 98.7 (05 Feb 2018 21:49)  HR: 78 (06 Feb 2018 16:00) (70 - 82)  BP: 148/71 (06 Feb 2018 16:00) (122/69 - 189/88)  BP(mean): 104 (06 Feb 2018 16:00) (87 - 135)  ABP: --  ABP(mean): --  RR: 16 (06 Feb 2018 16:00) (15 - 32)  SpO2: 98% (06 Feb 2018 16:00) (92% - 98%)      Physical Exam:  General: NAD  HEENT: NC/AT, EOMI, PERRLA, normal hearing, no oral lesions, neck supple w/o LAD  Pulmonary: Nonlabored breathing, no respiratory distress, CTA-B  Cardiovascular: NSR, no murmurs  Abdominal: soft, NT/ND, +BS, no organomegaly  Extremities: WWP, 5/5 strength x 4, no clubbing/cyanosis/edema  Neuro: A/O x3, CNs II-XII grossly intact, normal motor/sensation, no focal deficits  Pulses: palpable distal pulses    Lines/tubes/drains:    Vent settings:      I&O's Summary    05 Feb 2018 07:01  -  06 Feb 2018 07:00  --------------------------------------------------------  IN: 2400 mL / OUT: 1415 mL / NET: 985 mL    06 Feb 2018 07:01  -  06 Feb 2018 16:55  --------------------------------------------------------  IN: 900 mL / OUT: 250 mL / NET: 650 mL        LABS:                        7.2    7.3   )-----------( 267      ( 06 Feb 2018 05:59 )             22.5     02-06    137  |  105  |  9   ----------------------------<  104<H>  3.2<L>   |  21<L>  |  0.94    Ca    8.1<L>      06 Feb 2018 05:59  Phos  3.1     02-06  Mg     1.8     02-06    TPro  5.6<L>  /  Alb  2.0<L>  /  TBili  5.3<H>  /  DBili  4.2<H>  /  AST  40  /  ALT  43  /  AlkPhos  575<H>  02-06    PT/INR - ( 06 Feb 2018 05:59 )   PT: 15.9 sec;   INR: 1.42          PTT - ( 06 Feb 2018 05:59 )  PTT:35.9 sec    CAPILLARY BLOOD GLUCOSE      POCT Blood Glucose.: 100 mg/dL (06 Feb 2018 16:48)  POCT Blood Glucose.: 101 mg/dL (06 Feb 2018 10:24)  POCT Blood Glucose.: 103 mg/dL (06 Feb 2018 06:18)  POCT Blood Glucose.: 83 mg/dL (05 Feb 2018 22:41)  POCT Blood Glucose.: 114 mg/dL (05 Feb 2018 18:09)  POCT Blood Glucose.: 61 mg/dL (05 Feb 2018 17:36)    LIVER FUNCTIONS - ( 06 Feb 2018 05:59 )  Alb: 2.0 g/dL / Pro: 5.6 g/dL / ALK PHOS: 575 U/L / ALT: 43 U/L / AST: 40 U/L / GGT: x             Cultures:    RADIOLOGY & ADDITIONAL STUDIES: 24 hr events:  o/n: transferred to SICU for hyperbilirubinemia in setting of SBO. stone placed for strict I/Os  2/5: Provider to RN note pt MUST be out of bed to eat/drink and it should be out of medicine cups, Zofran and reglan d/c'd and only will be given prn as per Dr. Brady, prevena removed today, Antibiotics and antifungal d/c d, elevated alexandra, CT shows common limp obstruction, NGT placement      SUBJECTIVE: pain better controlled. No nausea, no fevers, no CP/SOA. Unsure about bowel function.      MEDICATIONS  (STANDING):  bisacodyl Suppository 10 milliGRAM(s) Rectal daily  dextrose 5% + lactated ringers. 1000 milliLiter(s) (120 mL/Hr) IV Continuous <Continuous>  diazepam  Injectable 5 milliGRAM(s) IV Push at bedtime  fat emulsion (Plant Based) 20% Infusion 0.7 Gm/kG/Day (20.556 mL/Hr) IV Continuous <Continuous>  heparin  flush 100 Units/mL Injectable 100 Unit(s) IV Push every other day  heparin  Injectable 5000 Unit(s) SubCutaneous every 8 hours  insulin lispro (HumaLOG) corrective regimen sliding scale   SubCutaneous Before meals and at bedtime  metoclopramide Injectable 5 milliGRAM(s) IV Push every 6 hours  pantoprazole  Injectable 40 milliGRAM(s) IV Push two times a day  Parenteral Nutrition - Adult 1 Each (63 mL/Hr) TPN Continuous <Continuous>  potassium chloride  10 mEq/100 mL IVPB 10 milliEquivalent(s) IV Intermittent every 1 hour  sodium chloride 3%  Inhalation 4 milliLiter(s) Inhalation once    MEDICATIONS  (PRN):  labetalol Injectable 10 milliGRAM(s) IV Push every 2 hours PRN Systolic blood pressure >160      Stone:	  [ ] None	[ ] Daily Stone Order Placed	   Indication:	  [ ] Strict I and O's    [ ] Obstruction     [ ] Incontinence + Stage 3 or 4 Decubitus  Central Line:  [ ] None	   [ ]  Medication / TPN Administration     Drips:     ICU Vital Signs Last 24 Hrs  T(C): 36.7 (06 Feb 2018 09:02), Max: 37.1 (05 Feb 2018 21:49)  T(F): 98.1 (06 Feb 2018 09:02), Max: 98.7 (05 Feb 2018 21:49)  HR: 78 (06 Feb 2018 16:00) (70 - 82)  BP: 148/71 (06 Feb 2018 16:00) (122/69 - 189/88)  BP(mean): 104 (06 Feb 2018 16:00) (87 - 135)  ABP: --  ABP(mean): --  RR: 16 (06 Feb 2018 16:00) (15 - 32)  SpO2: 98% (06 Feb 2018 16:00) (92% - 98%)      Physical Exam:  General: NAD  HEENT: NC/AT, EOMI, normal hearing  Pulmonary: Nonlabored breathing, no respiratory distress, CTA-B  Cardiovascular: NSR, no murmurs  Abdominal: soft, obese, NT, minimal distention, increasing bowel sounds  Extremities: WWP, 5/5 strength x 4, no clubbing/cyanosis/edema  Neuro: A/O x3, CNs II-XII grossly intact, normal motor/sensation, no focal deficits  Pulses: palpable distal pulses      I&O's Summary    05 Feb 2018 07:01  -  06 Feb 2018 07:00  --------------------------------------------------------  IN: 2400 mL / OUT: 1415 mL / NET: 985 mL    06 Feb 2018 07:01  -  06 Feb 2018 16:55  --------------------------------------------------------  IN: 900 mL / OUT: 250 mL / NET: 650 mL        LABS:                        7.2    7.3   )-----------( 267      ( 06 Feb 2018 05:59 )             22.5     02-06    137  |  105  |  9   ----------------------------<  104<H>  3.2<L>   |  21<L>  |  0.94    Ca    8.1<L>      06 Feb 2018 05:59  Phos  3.1     02-06  Mg     1.8     02-06    TPro  5.6<L>  /  Alb  2.0<L>  /  TBili  5.3<H>  /  DBili  4.2<H>  /  AST  40  /  ALT  43  /  AlkPhos  575<H>  02-06    PT/INR - ( 06 Feb 2018 05:59 )   PT: 15.9 sec;   INR: 1.42          PTT - ( 06 Feb 2018 05:59 )  PTT:35.9 sec    CAPILLARY BLOOD GLUCOSE      POCT Blood Glucose.: 100 mg/dL (06 Feb 2018 16:48)  POCT Blood Glucose.: 101 mg/dL (06 Feb 2018 10:24)  POCT Blood Glucose.: 103 mg/dL (06 Feb 2018 06:18)  POCT Blood Glucose.: 83 mg/dL (05 Feb 2018 22:41)  POCT Blood Glucose.: 114 mg/dL (05 Feb 2018 18:09)  POCT Blood Glucose.: 61 mg/dL (05 Feb 2018 17:36)    LIVER FUNCTIONS - ( 06 Feb 2018 05:59 )  Alb: 2.0 g/dL / Pro: 5.6 g/dL / ALK PHOS: 575 U/L / ALT: 43 U/L / AST: 40 U/L / GGT: x

## 2018-02-07 LAB
ALBUMIN SERPL ELPH-MCNC: 1.9 G/DL — LOW (ref 3.3–5)
ALBUMIN SERPL ELPH-MCNC: 2 G/DL — LOW (ref 3.3–5)
ALP SERPL-CCNC: 554 U/L — HIGH (ref 40–120)
ALT FLD-CCNC: 42 U/L — SIGNIFICANT CHANGE UP (ref 10–45)
ANION GAP SERPL CALC-SCNC: 15 MMOL/L — SIGNIFICANT CHANGE UP (ref 5–17)
AST SERPL-CCNC: 40 U/L — SIGNIFICANT CHANGE UP (ref 10–40)
BILIRUB DIRECT SERPL-MCNC: 2.3 MG/DL — HIGH (ref 0–0.2)
BILIRUB INDIRECT FLD-MCNC: 0.9 MG/DL — SIGNIFICANT CHANGE UP (ref 0.2–1)
BILIRUB SERPL-MCNC: 3.2 MG/DL — HIGH (ref 0.2–1.2)
BUN SERPL-MCNC: 12 MG/DL — SIGNIFICANT CHANGE UP (ref 7–23)
CALCIUM SERPL-MCNC: 8.4 MG/DL — SIGNIFICANT CHANGE UP (ref 8.4–10.5)
CHLORIDE SERPL-SCNC: 102 MMOL/L — SIGNIFICANT CHANGE UP (ref 96–108)
CHOLEST SERPL-MCNC: 147 MG/DL — SIGNIFICANT CHANGE UP (ref 10–199)
CO2 SERPL-SCNC: 21 MMOL/L — LOW (ref 22–31)
CREAT SERPL-MCNC: 0.9 MG/DL — SIGNIFICANT CHANGE UP (ref 0.5–1.3)
GLUCOSE BLDC GLUCOMTR-MCNC: 100 MG/DL — HIGH (ref 70–99)
GLUCOSE BLDC GLUCOMTR-MCNC: 111 MG/DL — HIGH (ref 70–99)
GLUCOSE BLDC GLUCOMTR-MCNC: 112 MG/DL — HIGH (ref 70–99)
GLUCOSE BLDC GLUCOMTR-MCNC: 135 MG/DL — HIGH (ref 70–99)
GLUCOSE SERPL-MCNC: 112 MG/DL — HIGH (ref 70–99)
HCT VFR BLD CALC: 21.8 % — LOW (ref 34.5–45)
HDLC SERPL-MCNC: 4 MG/DL — LOW (ref 40–125)
HGB BLD-MCNC: 7.1 G/DL — LOW (ref 11.5–15.5)
LIPID PNL WITH DIRECT LDL SERPL: 51 MG/DL — SIGNIFICANT CHANGE UP
MAGNESIUM SERPL-MCNC: 2.1 MG/DL — SIGNIFICANT CHANGE UP (ref 1.6–2.6)
MCHC RBC-ENTMCNC: 27.8 PG — SIGNIFICANT CHANGE UP (ref 27–34)
MCHC RBC-ENTMCNC: 32.6 G/DL — SIGNIFICANT CHANGE UP (ref 32–36)
MCV RBC AUTO: 85.5 FL — SIGNIFICANT CHANGE UP (ref 80–100)
PHOSPHATE SERPL-MCNC: 3.4 MG/DL — SIGNIFICANT CHANGE UP (ref 2.5–4.5)
PLATELET # BLD AUTO: 266 K/UL — SIGNIFICANT CHANGE UP (ref 150–400)
POTASSIUM SERPL-MCNC: 3.6 MMOL/L — SIGNIFICANT CHANGE UP (ref 3.5–5.3)
POTASSIUM SERPL-SCNC: 3.6 MMOL/L — SIGNIFICANT CHANGE UP (ref 3.5–5.3)
PROT SERPL-MCNC: 5.6 G/DL — LOW (ref 6–8.3)
RBC # BLD: 2.55 M/UL — LOW (ref 3.8–5.2)
RBC # FLD: 17.9 % — HIGH (ref 10.3–16.9)
SODIUM SERPL-SCNC: 138 MMOL/L — SIGNIFICANT CHANGE UP (ref 135–145)
TOTAL CHOLESTEROL/HDL RATIO MEASUREMENT: 36.8 RATIO — HIGH (ref 3.3–7.1)
TRIGL SERPL-MCNC: 461 MG/DL — HIGH (ref 10–149)
WBC # BLD: 8.4 K/UL — SIGNIFICANT CHANGE UP (ref 3.8–10.5)
WBC # FLD AUTO: 8.4 K/UL — SIGNIFICANT CHANGE UP (ref 3.8–10.5)

## 2018-02-07 PROCEDURE — 71045 X-RAY EXAM CHEST 1 VIEW: CPT | Mod: 26

## 2018-02-07 RX ORDER — ELECTROLYTE SOLUTION,INJ
1 VIAL (ML) INTRAVENOUS
Qty: 0 | Refills: 0 | Status: DISCONTINUED | OUTPATIENT
Start: 2018-02-07 | End: 2018-02-07

## 2018-02-07 RX ORDER — ACETAMINOPHEN 500 MG
1000 TABLET ORAL ONCE
Qty: 0 | Refills: 0 | Status: COMPLETED | OUTPATIENT
Start: 2018-02-07 | End: 2018-02-07

## 2018-02-07 RX ORDER — POTASSIUM CHLORIDE 20 MEQ
20 PACKET (EA) ORAL
Qty: 0 | Refills: 0 | Status: COMPLETED | OUTPATIENT
Start: 2018-02-07 | End: 2018-02-07

## 2018-02-07 RX ADMIN — HEPARIN SODIUM 5000 UNIT(S): 5000 INJECTION INTRAVENOUS; SUBCUTANEOUS at 06:13

## 2018-02-07 RX ADMIN — Medication 5 MILLIGRAM(S): at 06:12

## 2018-02-07 RX ADMIN — Medication 10 MILLIGRAM(S): at 23:38

## 2018-02-07 RX ADMIN — Medication 400 MILLIGRAM(S): at 23:52

## 2018-02-07 RX ADMIN — Medication 50 MILLIEQUIVALENT(S): at 07:57

## 2018-02-07 RX ADMIN — Medication 5 MILLIGRAM(S): at 00:43

## 2018-02-07 RX ADMIN — Medication 5 MILLIGRAM(S): at 13:16

## 2018-02-07 RX ADMIN — Medication 10 MILLIGRAM(S): at 00:56

## 2018-02-07 RX ADMIN — HEPARIN SODIUM 5000 UNIT(S): 5000 INJECTION INTRAVENOUS; SUBCUTANEOUS at 22:45

## 2018-02-07 RX ADMIN — Medication 400 MILLIGRAM(S): at 14:42

## 2018-02-07 RX ADMIN — PANTOPRAZOLE SODIUM 40 MILLIGRAM(S): 20 TABLET, DELAYED RELEASE ORAL at 22:45

## 2018-02-07 RX ADMIN — Medication 10 MILLIGRAM(S): at 13:16

## 2018-02-07 RX ADMIN — Medication 50 MILLIEQUIVALENT(S): at 13:15

## 2018-02-07 RX ADMIN — Medication 63 EACH: at 19:03

## 2018-02-07 RX ADMIN — PANTOPRAZOLE SODIUM 40 MILLIGRAM(S): 20 TABLET, DELAYED RELEASE ORAL at 06:13

## 2018-02-07 RX ADMIN — HEPARIN SODIUM 5000 UNIT(S): 5000 INJECTION INTRAVENOUS; SUBCUTANEOUS at 15:37

## 2018-02-07 NOTE — PROGRESS NOTE ADULT - SUBJECTIVE AND OBJECTIVE BOX
much improved and tbili is down to 3  meaning that rise was due to stagnation  has moved bowels and repeat ct scan shows aamir decompressed and bp limb better  I think this is more functional than mechanical  cholecytostomy placed  on tpn  await until ngt is less than 50 cc shift for several days  then will get contrast study with non ionic contrast

## 2018-02-07 NOTE — PROGRESS NOTE ADULT - SUBJECTIVE AND OBJECTIVE BOX
Psychiatry Follow Up Note    Patient seen yesterday by psychology intern Ange Yeung.  At that time patient appeared more optimistic, in better spirits.    Was asked to see patient by primary team today again as she appeared more hopeless in the setting of again being NPO.  Spoke extensively to patient's  at bedside who says patient has had long time "obsession" with food and tries to manipulate people into bringing her food and drinks that she is not allowed.   frustrated because when patient was started on jello, broth, and ices post-op patient said she didn't like the flavors.  On interview with me.  Patient irritable, minimally engaged.  Says she wants jello and despite very recent discussion wtih  about not being allowed water and ice chips asks me to bring her an ice chip.  Says she wants jello.  Wants to go home and eat at home.  Validated patient's frustration; patient became upset I would not bring her an ice chip and asked me to leave.    Assessment/Plan: 57F complicated medical history, admitted since July, now s/p EC fistula take down and revision of aamir-en-y, with history of acting out behaviors during admission (requesting extra Dilaudid, trying to obtain food while NPO), now with exacerbation in unpleasant behaviors following surgery.  Patient was effectively masking anger and frustration earlier in admission by appearing nonchalant, sleeping extensively, regressed, but now anger much more open and apparent after surgery.  Patient assumed she would have rapid recovery after surgery and frustrated with post-op recovery.      -Would continue Lexapro and Valium as ordered  -Treat post-op pain as in any patient with similar recent surgery and titrate down when appropriate as per pain management team.    -Continue to set limits with patient regarding pain management, nutritional status, etc for safety of patient.    -Consider reapplying sign in patient's room with clear instructions regarding what patient is allowed to eat and drink.    Millie Hatfield MD   Psychiatry Attending

## 2018-02-07 NOTE — PROGRESS NOTE ADULT - ASSESSMENT
57 F s/p  esophagojejunostomy revision c/b EC fistula now s/p medical optimization of EC fistula and return to OR for EC fistula take down and subsequent revision of her aamir-en-y (1/30). Transferred back to SICU 2/5 for jaundice + bowel distension found to have functional biliary obstruction s/p perc cholecystostomy placement    Neuro: acetaminophen PRN, zofran prn, diazapam 5 qhs  CV: normotensive goals  Pulm: NC  FENGI: NPO, NGT to LCS, PPI, reglan, supp/enemas  : Yu  Endo: ISS  ID: discontinued zosyn (1/30-2/6), melany (1/30-2/6), Vanc (1/30-1/31)   Ppx: SCDs, SQH  Lines: L IJ (1/.31-)  Wounds: midline wound with dry gauze dressing. SubQ CHECO x 2, cholecystostomy (2/6-)

## 2018-02-07 NOTE — PROGRESS NOTE ADULT - SUBJECTIVE AND OBJECTIVE BOX
S: Pt reports having slept well, wants to drink water, no other complaints this morning. No HA/CP/SOB. Pt reports pain is well controlled.     Vitals: Vital Signs Last 24 Hrs  T(C): 37.3 (07 Feb 2018 06:13), Max: 37.3 (07 Feb 2018 06:13)  T(F): 99.1 (07 Feb 2018 06:13), Max: 99.1 (07 Feb 2018 06:13)  HR: 78 (07 Feb 2018 06:05) (66 - 82)  BP: 157/79 (07 Feb 2018 06:05) (105/63 - 178/85)  BP(mean): 99 (07 Feb 2018 06:05) (78 - 123)  RR: 34 (07 Feb 2018 06:05) (16 - 39)  SpO2: 94% (07 Feb 2018 06:05) (93% - 98%)            02-06 @ 07:01  -  02-07 @ 07:00  --------------------------------------------------------  IN: 2077 mL / OUT: 1785 mL / NET: 292 mL            Labs:                         7.1    8.4   )-----------( 266      ( 07 Feb 2018 05:58 )             21.8   02-07    138  |  102  |  12  ----------------------------<  112<H>  3.6   |  21<L>  |  0.90    Ca    8.4      07 Feb 2018 05:56  Phos  3.4     02-07  Mg     2.1     02-07    TPro  5.6<L>  /  Alb  1.9<L>  /  TBili  3.2<H>  /  DBili  2.3<H>  /  AST  40  /  ALT  42  /  AlkPhos  554<H>  02-07  PT/INR - ( 06 Feb 2018 05:59 )   PT: 15.9 sec;   INR: 1.42          PTT - ( 06 Feb 2018 05:59 )  PTT:35.9 sec    Electrolytes repleated to goals as follows: Potassium 4, Phosphorus 3 and Magnesium 2 where appropriate and necessary    Exam:  Neuro: A&Ox3, NAD, grossly neuro intact  HEENT: PERRL < EOMI, MMM  Neck: Supple  CV: RRR, no MRGs  Pulm: CTAB, no wheezes, rales ronchi  Abd: moderately distended, midline incision with trace ss drainage on dressing, RUQ perc steven drain in place draining bilious fluid, BS hypoactive, Soft, NT  : Yu in place  Ext: 2+ b/l UE, trace central and b/l LE edema  Vasc: Extremities warm to palpation, 2+ pulses - radial and PT  MSK: no joint swelling  Skin: no rash  Psych: affect appropriate

## 2018-02-07 NOTE — PROGRESS NOTE ADULT - SUBJECTIVE AND OBJECTIVE BOX
56 y/o F s/p cholecystostomy tube placement. No complaints.  Drain output: 40cc/last 24 hours as of this morning. Bilious.  Easily flushed with 5cc NS.

## 2018-02-08 DIAGNOSIS — F41.9 ANXIETY DISORDER, UNSPECIFIED: ICD-10-CM

## 2018-02-08 DIAGNOSIS — E80.6 OTHER DISORDERS OF BILIRUBIN METABOLISM: ICD-10-CM

## 2018-02-08 DIAGNOSIS — E43 UNSPECIFIED SEVERE PROTEIN-CALORIE MALNUTRITION: ICD-10-CM

## 2018-02-08 DIAGNOSIS — I10 ESSENTIAL (PRIMARY) HYPERTENSION: ICD-10-CM

## 2018-02-08 DIAGNOSIS — D64.9 ANEMIA, UNSPECIFIED: ICD-10-CM

## 2018-02-08 LAB
ALBUMIN SERPL ELPH-MCNC: 2.2 G/DL — LOW (ref 3.3–5)
ALP SERPL-CCNC: 506 U/L — HIGH (ref 40–120)
ALT FLD-CCNC: 32 U/L — SIGNIFICANT CHANGE UP (ref 10–45)
ANION GAP SERPL CALC-SCNC: 10 MMOL/L — SIGNIFICANT CHANGE UP (ref 5–17)
AST SERPL-CCNC: 25 U/L — SIGNIFICANT CHANGE UP (ref 10–40)
BILIRUB SERPL-MCNC: 2.2 MG/DL — HIGH (ref 0.2–1.2)
BUN SERPL-MCNC: 13 MG/DL — SIGNIFICANT CHANGE UP (ref 7–23)
CALCIUM SERPL-MCNC: 8.4 MG/DL — SIGNIFICANT CHANGE UP (ref 8.4–10.5)
CHLORIDE SERPL-SCNC: 107 MMOL/L — SIGNIFICANT CHANGE UP (ref 96–108)
CO2 SERPL-SCNC: 24 MMOL/L — SIGNIFICANT CHANGE UP (ref 22–31)
CREAT SERPL-MCNC: 0.83 MG/DL — SIGNIFICANT CHANGE UP (ref 0.5–1.3)
CULTURE RESULTS: NO GROWTH — SIGNIFICANT CHANGE UP
EOSINOPHIL NFR BLD AUTO: 3 % — SIGNIFICANT CHANGE UP (ref 0–6)
GLUCOSE BLDC GLUCOMTR-MCNC: 111 MG/DL — HIGH (ref 70–99)
GLUCOSE BLDC GLUCOMTR-MCNC: 129 MG/DL — HIGH (ref 70–99)
GLUCOSE BLDC GLUCOMTR-MCNC: 143 MG/DL — HIGH (ref 70–99)
GLUCOSE BLDC GLUCOMTR-MCNC: 149 MG/DL — HIGH (ref 70–99)
GLUCOSE SERPL-MCNC: 148 MG/DL — HIGH (ref 70–99)
HCT VFR BLD CALC: 23.2 % — LOW (ref 34.5–45)
HGB BLD-MCNC: 7.1 G/DL — LOW (ref 11.5–15.5)
LYMPHOCYTES # BLD AUTO: 6 % — LOW (ref 13–44)
MAGNESIUM SERPL-MCNC: 2.1 MG/DL — SIGNIFICANT CHANGE UP (ref 1.6–2.6)
MCHC RBC-ENTMCNC: 27.2 PG — SIGNIFICANT CHANGE UP (ref 27–34)
MCHC RBC-ENTMCNC: 30.6 G/DL — LOW (ref 32–36)
MCV RBC AUTO: 88.9 FL — SIGNIFICANT CHANGE UP (ref 80–100)
MONOCYTES NFR BLD AUTO: 4 % — SIGNIFICANT CHANGE UP (ref 2–14)
NEUTROPHILS NFR BLD AUTO: 71 % — SIGNIFICANT CHANGE UP (ref 43–77)
PHOSPHATE SERPL-MCNC: 3.1 MG/DL — SIGNIFICANT CHANGE UP (ref 2.5–4.5)
PLATELET # BLD AUTO: 278 K/UL — SIGNIFICANT CHANGE UP (ref 150–400)
POTASSIUM SERPL-MCNC: 4.1 MMOL/L — SIGNIFICANT CHANGE UP (ref 3.5–5.3)
POTASSIUM SERPL-SCNC: 4.1 MMOL/L — SIGNIFICANT CHANGE UP (ref 3.5–5.3)
PREALB SERPL-MCNC: 14 MG/DL — LOW (ref 20–40)
PROT SERPL-MCNC: 6.2 G/DL — SIGNIFICANT CHANGE UP (ref 6–8.3)
RBC # BLD: 2.61 M/UL — LOW (ref 3.8–5.2)
RBC # FLD: 18.6 % — HIGH (ref 10.3–16.9)
SODIUM SERPL-SCNC: 141 MMOL/L — SIGNIFICANT CHANGE UP (ref 135–145)
SPECIMEN SOURCE: SIGNIFICANT CHANGE UP
TRIGL SERPL-MCNC: 365 MG/DL — HIGH (ref 10–149)
WBC # BLD: 7.6 K/UL — SIGNIFICANT CHANGE UP (ref 3.8–10.5)
WBC # FLD AUTO: 7.6 K/UL — SIGNIFICANT CHANGE UP (ref 3.8–10.5)

## 2018-02-08 PROCEDURE — 71045 X-RAY EXAM CHEST 1 VIEW: CPT | Mod: 26

## 2018-02-08 PROCEDURE — 99233 SBSQ HOSP IP/OBS HIGH 50: CPT | Mod: GC

## 2018-02-08 PROCEDURE — 93306 TTE W/DOPPLER COMPLETE: CPT | Mod: 26

## 2018-02-08 PROCEDURE — 74018 RADEX ABDOMEN 1 VIEW: CPT | Mod: 26,59

## 2018-02-08 RX ORDER — ELECTROLYTE SOLUTION,INJ
1 VIAL (ML) INTRAVENOUS
Qty: 0 | Refills: 0 | Status: DISCONTINUED | OUTPATIENT
Start: 2018-02-08 | End: 2018-02-08

## 2018-02-08 RX ORDER — I.V. FAT EMULSION 20 G/100ML
50 EMULSION INTRAVENOUS ONCE
Qty: 0 | Refills: 0 | Status: COMPLETED | OUTPATIENT
Start: 2018-02-08 | End: 2018-02-08

## 2018-02-08 RX ORDER — HYDRALAZINE HCL 50 MG
5 TABLET ORAL EVERY 6 HOURS
Qty: 0 | Refills: 0 | Status: DISCONTINUED | OUTPATIENT
Start: 2018-02-08 | End: 2018-02-09

## 2018-02-08 RX ORDER — ACETAMINOPHEN 500 MG
1000 TABLET ORAL ONCE
Qty: 0 | Refills: 0 | Status: COMPLETED | OUTPATIENT
Start: 2018-02-08 | End: 2018-02-08

## 2018-02-08 RX ORDER — LABETALOL HCL 100 MG
10 TABLET ORAL ONCE
Qty: 0 | Refills: 0 | Status: COMPLETED | OUTPATIENT
Start: 2018-02-08 | End: 2018-02-08

## 2018-02-08 RX ORDER — ONDANSETRON 8 MG/1
4 TABLET, FILM COATED ORAL ONCE
Qty: 0 | Refills: 0 | Status: COMPLETED | OUTPATIENT
Start: 2018-02-08 | End: 2018-02-09

## 2018-02-08 RX ADMIN — HEPARIN SODIUM 5000 UNIT(S): 5000 INJECTION INTRAVENOUS; SUBCUTANEOUS at 21:09

## 2018-02-08 RX ADMIN — PANTOPRAZOLE SODIUM 40 MILLIGRAM(S): 20 TABLET, DELAYED RELEASE ORAL at 18:26

## 2018-02-08 RX ADMIN — Medication 400 MILLIGRAM(S): at 19:33

## 2018-02-08 RX ADMIN — Medication 10 MILLIGRAM(S): at 13:14

## 2018-02-08 RX ADMIN — Medication 1000 MILLIGRAM(S): at 20:40

## 2018-02-08 RX ADMIN — HEPARIN SODIUM 5000 UNIT(S): 5000 INJECTION INTRAVENOUS; SUBCUTANEOUS at 06:27

## 2018-02-08 RX ADMIN — Medication 100 UNIT(S): at 13:15

## 2018-02-08 RX ADMIN — Medication 10 MILLIGRAM(S): at 21:09

## 2018-02-08 RX ADMIN — Medication 1000 MILLIGRAM(S): at 00:52

## 2018-02-08 RX ADMIN — Medication 10 MILLIGRAM(S): at 09:30

## 2018-02-08 RX ADMIN — HEPARIN SODIUM 5000 UNIT(S): 5000 INJECTION INTRAVENOUS; SUBCUTANEOUS at 13:14

## 2018-02-08 RX ADMIN — PANTOPRAZOLE SODIUM 40 MILLIGRAM(S): 20 TABLET, DELAYED RELEASE ORAL at 06:27

## 2018-02-08 RX ADMIN — Medication 10 MILLIGRAM(S): at 06:32

## 2018-02-08 RX ADMIN — Medication 5 MILLIGRAM(S): at 18:26

## 2018-02-08 RX ADMIN — Medication 1 EACH: at 18:26

## 2018-02-08 RX ADMIN — I.V. FAT EMULSION 20.83 GRAM(S): 20 EMULSION INTRAVENOUS at 18:27

## 2018-02-08 NOTE — PROGRESS NOTE ADULT - ASSESSMENT
57 F s/p  esophagojejunostomy revision c/b EC fistula now s/p medical optimization of EC fistula and return to OR for EC fistula take down and subsequent revision of her aamir-en-y (1/30). Transferred back to SICU 2/5 for jaundice + bowel distension found to have functional biliary obstruction s/p perc cholecystostomy placement    Neuro: acetaminophen PRN, zofran prn, diazapam 5 qhs  CV: normotensive goals  Pulm: NC  FENGI: NPO, NGT to LCS, PPI, reglan, supp/enemas, TPN/Lipids  : Yu  Endo: ISS  ID: discontinued zosyn (1/30-2/6), melany (1/30-2/6), Vanc (1/30-1/31)   Ppx: SCDs, SQH  Lines: L IJ (1/.31-)

## 2018-02-08 NOTE — PROGRESS NOTE ADULT - PROBLEM SELECTOR PLAN 6
Albumin 2.1.  -receiving tPA  -Encourage po intake when clinically appropriate.    Will continue to follow with you.  Case discussed with attending Dr Church, and Primary Team.

## 2018-02-08 NOTE — PROGRESS NOTE ADULT - PROBLEM SELECTOR PLAN 3
Obstructive 2/2 multiple abdominal surgeries. Now improving s/p percutaneous cholecystostomy.  -Management as per primary team.

## 2018-02-08 NOTE — PROGRESS NOTE ADULT - ASSESSMENT
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 Seen by GI, with EGD revealing EJ junction with healed mucosa. Subsequently found to have hyperbilirubinemia and evidence of obstruction on CT abdomen. Transferred to SICU and subsequently underwent percutaneous cholecystostomy by IR. Now improving and stepped back down to [facility] telemetry unit. 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 Seen by GI, with EGD revealing EJ junction with healed mucosa. Subsequently found to have hyperbilirubinemia and evidence of obstruction on CT abdomen. Transferred to SICU and subsequently underwent percutaneous cholecystostomy by IR. Now improving and stepped back down to [facility] telemetry unit. Medicine continues to follow for optimization of hypertension.

## 2018-02-08 NOTE — PROGRESS NOTE ADULT - SUBJECTIVE AND OBJECTIVE BOX
58 y/o F s/p cholecystostomy tube placement. No complaints related to drain.   Drain output: 450cc/last 24 hours as of this morning. Bilious.  Easily flushed with 5cc NS.

## 2018-02-08 NOTE — PROGRESS NOTE ADULT - SUBJECTIVE AND OBJECTIVE BOX
ON: Pt removed NGT, NGT replaced. Placed on enhanced observation  2/7: advanced dextrose on TPN, triglycerides high, lipids TIW,  NGT output 200  Patient stepped down        57 F s/p  esophagojejunostomy revision c/b EC fistula now s/p medical optimization of EC fistula and return to OR for EC fistula take down and subsequent revision of her aamir-en-y (1/30). Transferred back to SICU 2/5 for jaundice + bowel distension found to have functional biliary obstruction s/p perc cholecystostomy placement    Neuro: acetaminophen PRN, zofran prn, diazapam 5 qhs  CV: normotensive goals  Pulm: NC  FENGI: NPO, NGT to LCS, PPI, reglan, supp/enemas, TPN/Lipids  : Yu  Endo: ISS  ID: discontinued zosyn (1/30-2/6), melany (1/30-2/6), Vanc (1/30-1/31)   Ppx: SCDs, SQH  Lines: L IJ (1/.31-) ON: Pt removed NGT, NGT replaced. Placed on enhanced observation  2/7: advanced dextrose on TPN, triglycerides high, lipids TIW,  NGT output 200  Patient stepped down        SUBJECTIVE: Patient report to having small BM. stating NGT feels better. Denies any complaints. No N/V    MEDICATIONS  (STANDING):  bisacodyl Suppository 10 milliGRAM(s) Rectal daily  diazepam  Injectable 5 milliGRAM(s) IV Push at bedtime  heparin  flush 100 Units/mL Injectable 100 Unit(s) IV Push every other day  heparin  Injectable 5000 Unit(s) SubCutaneous every 8 hours  insulin lispro (HumaLOG) corrective regimen sliding scale   SubCutaneous Before meals and at bedtime  pantoprazole  Injectable 40 milliGRAM(s) IV Push two times a day  Parenteral Nutrition - Adult 1 Each (63 mL/Hr) TPN Continuous <Continuous>  sodium chloride 3%  Inhalation 4 milliLiter(s) Inhalation once    MEDICATIONS  (PRN):  labetalol Injectable 10 milliGRAM(s) IV Push every 2 hours PRN Systolic blood pressure >160      Vital Signs Last 24 Hrs  T(C): 36.9 (08 Feb 2018 05:49), Max: 38 (07 Feb 2018 21:22)  T(F): 98.5 (08 Feb 2018 05:49), Max: 100.4 (07 Feb 2018 21:22)  HR: 76 (08 Feb 2018 06:30) (76 - 92)  BP: 178/84 (08 Feb 2018 06:30) (136/76 - 178/84)  BP(mean): 121 (08 Feb 2018 06:30) (99 - 121)  RR: 22 (08 Feb 2018 06:30) (20 - 34)  SpO2: 100% (08 Feb 2018 06:30) (91% - 100%)    PHYSICAL EXAM:      Constitutional: A&Ox3    Respiratory: non labored breathing, no respiratory distress    Cardiovascular: NSR, RRR    Gastrointestinal: Soft, NT, Obese, midline incision CDI, RUQ perc steven drain in place draining bilious fluid.                  Genitourinary: Yu    Extremities: (-) edema                  I&O's Detail    07 Feb 2018 07:01  -  08 Feb 2018 07:00  --------------------------------------------------------  IN:    fat emulsion (Plant Based) 20% Infusion: 20.5 mL    Solution: 100 mL    Solution: 100 mL    TPN (Total Parenteral Nutrition): 1134 mL  Total IN: 1354.5 mL    OUT:    Bulb: 180 mL    Bulb: 45 mL    Drain: 450 mL    Indwelling Catheter - Urethral: 650 mL    Nasoenteral Tube: 300 mL  Total OUT: 1625 mL    Total NET: -270.5 mL          LABS:                        7.1    7.6   )-----------( 278      ( 08 Feb 2018 06:12 )             23.2     02-08    141  |  107  |  13  ----------------------------<  148<H>  4.1   |  24  |  0.83    Ca    8.4      08 Feb 2018 06:12  Phos  3.1     02-08  Mg     2.1     02-08    TPro  6.2  /  Alb  2.2<L>  /  TBili  2.2<H>  /  DBili  x   /  AST  25  /  ALT  32  /  AlkPhos  506<H>  02-08          RADIOLOGY & ADDITIONAL STUDIES:

## 2018-02-08 NOTE — PROGRESS NOTE ADULT - PROBLEM SELECTOR PLAN 1
BPs elevated. Does not respond sufficiently to labetalol IV pushes. Previously well controlled on losartan. Unable to take oral meds at this time.  -Discontinue labetalol IV pushes.  -Start hydralazine 5mg IV q6h and hold for HR > 100 or systolic BP < 150. Can increase to 10mg IV q6h if inadequate response on lower dose. BPs elevated. Does not respond sufficiently to labetalol IV pushes. Previously well controlled on losartan. Unable to take oral meds at this time.  continue labetalol IV pushes.  -Start hydralazine 5mg IV q6h and hold for systolic BP < 150. Can increase to 10mg IV q6h if inadequate response on lower dose.

## 2018-02-08 NOTE — PROGRESS NOTE ADULT - SUBJECTIVE AND OBJECTIVE BOX
HPI:  56F w/PMHx of HTN, gastric bypass in 2002 c/b stricture, corkscrewed sleeve and chronic leak, revised on 11/28/16 with protracted (70-day) postoperative course complicated by MDR infections, a C-diff infection, respiratory compromise due to plugging/PNA/effusions requiring multiple interventions and a trach, as well as a continued leak requiring a draining double-pigtail with stenting performed by GI. Pt had longstanding enterocutaneous fistula which she presents for resection of - patient now w/ prolonged hospitalization for recurrent EC fistulas. Cardiology consulted for HTN mgmt and tachycardia.      ROS: A 10-point review of systems was otherwise negative.    PAST MEDICAL & SURGICAL HISTORY:  Reflux  Obesity  DVT (deep venous thrombosis): 2013 neck  Diverticulitis  Hypertension  Elective surgery: abodominal wall surgery  dec 2016  Elective surgery: NOV 2016 gastric bypass revision  Gastric bypass status for obesity: gastric sleeve, 6/2012  S/P breast biopsy: 2011, left  S/P colon resection: 2011  S/P knee surgery: repair 2009, 2011  right; left  Umbilical hernia: 2000  S/P appendectomy: 1975  S/P tonsillectomy: 1967      SOCIAL HISTORY:  FAMILY HISTORY:  No pertinent family history in first degree relatives      ALLERGIES: 	  Allergies    sulfa drugs (Unknown)  sulfamethoxazole (Other)    Intolerances        MEDICATIONS:  bisacodyl Suppository 10 milliGRAM(s) Rectal daily  diazepam  Injectable 5 milliGRAM(s) IV Push at bedtime  fat emulsion (Plant Based) 20% IVPB 50 Gram(s) IV Intermittent once  heparin  flush 100 Units/mL Injectable 100 Unit(s) IV Push every other day  heparin  Injectable 5000 Unit(s) SubCutaneous every 8 hours  insulin lispro (HumaLOG) corrective regimen sliding scale   SubCutaneous Before meals and at bedtime  labetalol Injectable 10 milliGRAM(s) IV Push every 2 hours PRN  pantoprazole  Injectable 40 milliGRAM(s) IV Push two times a day  Parenteral Nutrition - Adult 1 Each TPN Continuous <Continuous>  Parenteral Nutrition - Adult 1 Each TPN Continuous <Continuous>  sodium chloride 3%  Inhalation 4 milliLiter(s) Inhalation once      PHYSICAL EXAM:  T(C): 37.1 (02-08-18 @ 14:19), Max: 38 (02-07-18 @ 21:22)  HR: 72 (02-08-18 @ 14:10) (72 - 88)  BP: 157/89 (02-08-18 @ 14:10) (141/76 - 186/84)  RR: 18 (02-08-18 @ 14:10) (18 - 22)  SpO2: 92% (02-08-18 @ 14:10) (91% - 100%)  Wt(kg): --    GEN: Awake, comfortable. NAD.   HEENT: NCAT, PERRL, EOMI. Mucosa moist. No JVD.   RESP: CTA b/l  CV: RRR, normal s1/s2. No m/r/g.  ABD: Soft, NTND. BS+  EXT: Warm. No edema, clubbing, or cyanosis.   NEURO: AAOx3. No focal deficits.    I&O's Summary    07 Feb 2018 07:01  -  08 Feb 2018 07:00  --------------------------------------------------------  IN: 1354.5 mL / OUT: 1625 mL / NET: -270.5 mL    08 Feb 2018 07:01  -  08 Feb 2018 15:46  --------------------------------------------------------  IN: 315 mL / OUT: 275 mL / NET: 40 mL        	  LABS:	 	    CARDIAC MARKERS:                                  7.1    7.6   )-----------( 278      ( 08 Feb 2018 06:12 )             23.2     02-08    141  |  107  |  13  ----------------------------<  148<H>  4.1   |  24  |  0.83    Ca    8.4      08 Feb 2018 06:12  Phos  3.1     02-08  Mg     2.1     02-08    TPro  6.2  /  Alb  2.2<L>  /  TBili  2.2<H>  /  DBili  x   /  AST  25  /  ALT  32  /  AlkPhos  506<H>  02-08         TELEMETRY: NSR w/ a 4 brief runs(<5sec) of SVT	    ECG: NSR, Poor R-wave progression HPI:  56F w/PMHx of HTN, gastric bypass in 2002 c/b stricture, corkscrewed sleeve and chronic leak, revised on 11/28/16 with protracted (70-day) postoperative course complicated by MDR infections, a C-diff infection, respiratory compromise due to plugging/PNA/effusions requiring multiple interventions and a trach, as well as a continued leak requiring a draining double-pigtail with stenting performed by GI. Pt had longstanding enterocutaneous fistula which she presents for resection of - patient now w/ prolonged hospitalization for recurrent EC fistulas. Cardiology consulted for HTN mgmt and tachycardia.      ROS: A 10-point review of systems was otherwise negative.    PAST MEDICAL & SURGICAL HISTORY:  Reflux  Obesity  DVT (deep venous thrombosis): 2013 neck  Diverticulitis  Hypertension  Elective surgery: abodominal wall surgery  dec 2016  Elective surgery: NOV 2016 gastric bypass revision  Gastric bypass status for obesity: gastric sleeve, 6/2012  S/P breast biopsy: 2011, left  S/P colon resection: 2011  S/P knee surgery: repair 2009, 2011  right; left  Umbilical hernia: 2000  S/P appendectomy: 1975  S/P tonsillectomy: 1967      SOCIAL HISTORY:  FAMILY HISTORY:  No pertinent family history in first degree relatives      ALLERGIES: 	  Allergies    sulfa drugs (Unknown)  sulfamethoxazole (Other)    Intolerances        MEDICATIONS:  bisacodyl Suppository 10 milliGRAM(s) Rectal daily  diazepam  Injectable 5 milliGRAM(s) IV Push at bedtime  fat emulsion (Plant Based) 20% IVPB 50 Gram(s) IV Intermittent once  heparin  flush 100 Units/mL Injectable 100 Unit(s) IV Push every other day  heparin  Injectable 5000 Unit(s) SubCutaneous every 8 hours  insulin lispro (HumaLOG) corrective regimen sliding scale   SubCutaneous Before meals and at bedtime  labetalol Injectable 10 milliGRAM(s) IV Push every 2 hours PRN  pantoprazole  Injectable 40 milliGRAM(s) IV Push two times a day  Parenteral Nutrition - Adult 1 Each TPN Continuous <Continuous>  Parenteral Nutrition - Adult 1 Each TPN Continuous <Continuous>  sodium chloride 3%  Inhalation 4 milliLiter(s) Inhalation once      PHYSICAL EXAM:  T(C): 37.1 (02-08-18 @ 14:19), Max: 38 (02-07-18 @ 21:22)  HR: 72 (02-08-18 @ 14:10) (72 - 88)  BP: 157/89 (02-08-18 @ 14:10) (141/76 - 186/84)  RR: 18 (02-08-18 @ 14:10) (18 - 22)  SpO2: 92% (02-08-18 @ 14:10) (91% - 100%)  Wt(kg): --    GEN: Awake, comfortable. NAD.   RESP: CTA b/l  CV: RRR, normal s1/s2. No m/r/g.  ABD: Soft, NTND. BS+  EXT: Warm. No edema, clubbing, or cyanosis.   NEURO: AAOx3. No focal deficits.    I&O's Summary    07 Feb 2018 07:01  -  08 Feb 2018 07:00  --------------------------------------------------------  IN: 1354.5 mL / OUT: 1625 mL / NET: -270.5 mL    08 Feb 2018 07:01  -  08 Feb 2018 15:46  --------------------------------------------------------  IN: 315 mL / OUT: 275 mL / NET: 40 mL        	  LABS:	 	    CARDIAC MARKERS:                                  7.1    7.6   )-----------( 278      ( 08 Feb 2018 06:12 )             23.2     02-08    141  |  107  |  13  ----------------------------<  148<H>  4.1   |  24  |  0.83    Ca    8.4      08 Feb 2018 06:12  Phos  3.1     02-08  Mg     2.1     02-08    TPro  6.2  /  Alb  2.2<L>  /  TBili  2.2<H>  /  DBili  x   /  AST  25  /  ALT  32  /  AlkPhos  506<H>  02-08         TELEMETRY: NSR w/ a 4 brief runs(<5sec) of SVT	    ECG: NSR, Poor R-wave progression

## 2018-02-08 NOTE — PROGRESS NOTE ADULT - PROBLEM SELECTOR PLAN 2
Enterocutaneous fistula now s/p repair and closure. Pain is well controlled. Surgical sites are c/d/i. Drain in place with serosanguinous drainage.  -Management as per primary team.  -Pain control: adequate on non-opioid regimen.  -DVT Ppx: SQH  -Encourage incentive spirometry.

## 2018-02-08 NOTE — PROGRESS NOTE ADULT - PROBLEM SELECTOR PLAN 5
2/2 blood loss from multiple operative procedures and serial phlebotomy. Remains stable.  -Maintain active type and screen. Minimize blood draws.

## 2018-02-08 NOTE — PROGRESS NOTE ADULT - SUBJECTIVE AND OBJECTIVE BOX
SUBJECTIVE:    CHIEF COMPLAINT:  Patient is a 57y old  Female who presents with a chief complaint of enterocutaneous fistula (24 Jul 2017 14:15)    INTERVAL HX:  Patient was seen and examined at bedside.     REVIEW OF SYSTEMS:  10pt ROS is negative except as document in HPI.      OBJECTIVE:    Vital Signs Last 24 Hrs  T(C): 36.4 (08 Feb 2018 09:55), Max: 38 (07 Feb 2018 21:22)  T(F): 97.6 (08 Feb 2018 09:55), Max: 100.4 (07 Feb 2018 21:22)  HR: 74 (08 Feb 2018 08:40) (74 - 92)  BP: 186/84 (08 Feb 2018 08:40) (141/76 - 186/84)  BP(mean): 121 (08 Feb 2018 08:40) (99 - 121)  RR: 21 (08 Feb 2018 08:40) (20 - 28)  SpO2: 99% (08 Feb 2018 08:40) (91% - 100%)    Height (cm): 160 (08-01 @ 10:08)  Weight (kg): 70.477 (01-30 @ 07:02)  BMI (kg/m2): 27.5 (01-30 @ 07:02)    CAPILLARY BLOOD GLUCOSE    POCT Blood Glucose.: 143 mg/dL (08 Feb 2018 05:44)  POCT Blood Glucose.: 135 mg/dL (07 Feb 2018 21:51)  POCT Blood Glucose.: 111 mg/dL (07 Feb 2018 17:28)  POCT Blood Glucose.: 112 mg/dL (07 Feb 2018 10:38)      PHYSICAL EXAM:      MEDICATIONS  (STANDING):  bisacodyl Suppository 10 milliGRAM(s) Rectal daily  diazepam  Injectable 5 milliGRAM(s) IV Push at bedtime  heparin  flush 100 Units/mL Injectable 100 Unit(s) IV Push every other day  heparin  Injectable 5000 Unit(s) SubCutaneous every 8 hours  insulin lispro (HumaLOG) corrective regimen sliding scale   SubCutaneous Before meals and at bedtime  pantoprazole  Injectable 40 milliGRAM(s) IV Push two times a day  Parenteral Nutrition - Adult 1 Each (63 mL/Hr) TPN Continuous <Continuous>  Parenteral Nutrition - Adult 1 Each (63 mL/Hr) TPN Continuous <Continuous>  sodium chloride 3%  Inhalation 4 milliLiter(s) Inhalation once    MEDICATIONS  (PRN):  labetalol Injectable 10 milliGRAM(s) IV Push every 2 hours PRN Systolic blood pressure >160      Allergies  sulfa drugs (Unknown)  sulfamethoxazole (Other)        LABS:                        7.1    7.6   )-----------( 278      ( 08 Feb 2018 06:12 )             23.2    Mean Cell Volume: 88.9 fL (02-08 @ 06:12)    02-08    141  |  107  |  13  ----------------------------<  148<H>  4.1   |  24  |  0.83    Ca    8.4      08 Feb 2018 06:12  Phos  3.1     02-08  Mg     2.1     02-08    TPro  6.2  /  Alb  2.2<L>  /  TBili  2.2<H>  /  DBili  x   /  AST  25  /  ALT  32  /  AlkPhos  506<H>  02-08      MICROBIOLOGY: REVIEWED      RADIOLOGY: REVIEWED SUBJECTIVE:    CHIEF COMPLAINT:  Patient is a 57y old  Female who presents with a chief complaint of enterocutaneous fistula (24 Jul 2017 14:15)    INTERVAL HX:  Patient was seen and examined at bedside. Patient is comfortable. Pain is well controlled. She is asking for food and drink by mouth. No headache, palpitations, chest pain, SOB.    REVIEW OF SYSTEMS:  10pt ROS is negative except as document in HPI.    OBJECTIVE:    Vital Signs Last 24 Hrs  T(C): 36.4 (08 Feb 2018 09:55), Max: 38 (07 Feb 2018 21:22)  T(F): 97.6 (08 Feb 2018 09:55), Max: 100.4 (07 Feb 2018 21:22)  HR: 74 (08 Feb 2018 08:40) (74 - 92)  BP: 186/84 (08 Feb 2018 08:40) (141/76 - 186/84)  BP(mean): 121 (08 Feb 2018 08:40) (99 - 121)  RR: 21 (08 Feb 2018 08:40) (20 - 28)  SpO2: 99% (08 Feb 2018 08:40) (91% - 100%)    Height (cm): 160 (08-01 @ 10:08)  Weight (kg): 70.477 (01-30 @ 07:02)  BMI (kg/m2): 27.5 (01-30 @ 07:02)    CAPILLARY BLOOD GLUCOSE    POCT Blood Glucose.: 143 mg/dL (08 Feb 2018 05:44)  POCT Blood Glucose.: 135 mg/dL (07 Feb 2018 21:51)  POCT Blood Glucose.: 111 mg/dL (07 Feb 2018 17:28)  POCT Blood Glucose.: 112 mg/dL (07 Feb 2018 10:38)      PHYSICAL EXAM:  Constitutional: No acute distress.  ENMT: Eyes clear, sclera non-injected, conjunctiva non-pallorous. Mucus membranes moist. NG tube in place, draining fluid.  Respiratory: Respirations non-labored, symmetric chest expansion, lungs clear to auscultation bilaterally.  Cardiac: RRR, S1/S2 present, no murmur.  Vasc: Warm, well-perfused. 2+ radial, dorsalis pedis pulses. trace lower extremity edema.  Abd: Soft, non-tender, non-distended. Normoactive bowel sounds. Cholecystostomy tube in place, draining bilious fluid. Surgical drain in place draining  serosanguinous fluid. Surgical incision c/d/i.  MSK: moving all extremities. ROM intact.  Skin: No rash  Neuro: Awake, alert and oriented to person, place, year and president. Speech fluent with normal fund of knowledge. PERRL, EOMI.  No focal motor or sensory deficits.  Psych: Normal mood. Appropriate affect.     MEDICATIONS  (STANDING):  bisacodyl Suppository 10 milliGRAM(s) Rectal daily  diazepam  Injectable 5 milliGRAM(s) IV Push at bedtime  heparin  flush 100 Units/mL Injectable 100 Unit(s) IV Push every other day  heparin  Injectable 5000 Unit(s) SubCutaneous every 8 hours  insulin lispro (HumaLOG) corrective regimen sliding scale   SubCutaneous Before meals and at bedtime  pantoprazole  Injectable 40 milliGRAM(s) IV Push two times a day  Parenteral Nutrition - Adult 1 Each (63 mL/Hr) TPN Continuous <Continuous>  Parenteral Nutrition - Adult 1 Each (63 mL/Hr) TPN Continuous <Continuous>  sodium chloride 3%  Inhalation 4 milliLiter(s) Inhalation once    MEDICATIONS  (PRN):  labetalol Injectable 10 milliGRAM(s) IV Push every 2 hours PRN Systolic blood pressure >160      Allergies  sulfa drugs (Unknown)  sulfamethoxazole (Other)    LABS:                        7.1    7.6   )-----------( 278      ( 08 Feb 2018 06:12 )             23.2    Mean Cell Volume: 88.9 fL (02-08 @ 06:12)    02-08    141  |  107  |  13  ----------------------------<  148<H>  4.1   |  24  |  0.83    Ca    8.4      08 Feb 2018 06:12  Phos  3.1     02-08  Mg     2.1     02-08    TPro  6.2  /  Alb  2.2<L>  /  TBili  2.2<H>  /  DBili  x   /  AST  25  /  ALT  32  /  AlkPhos  506<H>  02-08      MICROBIOLOGY: REVIEWED      RADIOLOGY: REVIEWED

## 2018-02-08 NOTE — PROGRESS NOTE ADULT - ASSESSMENT
56F w/PMHx of HTN, gastric bypass in 2002 c/b stricture, corkscrewed sleeve and chronic leak, revised on 11/28/16 with protracted (70-day) postoperative course complicated by MDR infections and recurrent enterocutaneous fistulas ->Cardiology now consulted for management of HTN and tachycardia  -Patient currently HD stable and asymptomatic  -Pt. had a few brief runs of Narrow complex tachycardia(<5sec) - asymptomatic; Will continue to monitor on Tele  -Please obtain TTE  -BP management will be difficult while the patient is NPO but would recommend - Labetalol 10mg IV q8h standing and can uptitrate to 20 IV q8h  -Once patient can take PO - would transition back to home dose of Diovan  -Monitor and replete electrolytes  -Case d/w Dr. Olea

## 2018-02-09 DIAGNOSIS — I47.2 VENTRICULAR TACHYCARDIA: ICD-10-CM

## 2018-02-09 LAB
ALBUMIN SERPL ELPH-MCNC: 2.2 G/DL — LOW (ref 3.3–5)
ALP SERPL-CCNC: 740 U/L — HIGH (ref 40–120)
ALT FLD-CCNC: 31 U/L — SIGNIFICANT CHANGE UP (ref 10–45)
ANION GAP SERPL CALC-SCNC: 7 MMOL/L — SIGNIFICANT CHANGE UP (ref 5–17)
AST SERPL-CCNC: 39 U/L — SIGNIFICANT CHANGE UP (ref 10–40)
BILIRUB DIRECT SERPL-MCNC: 1.1 MG/DL — HIGH (ref 0–0.2)
BILIRUB DIRECT SERPL-MCNC: 1.2 MG/DL — HIGH (ref 0–0.2)
BILIRUB INDIRECT FLD-MCNC: 0.7 MG/DL — SIGNIFICANT CHANGE UP (ref 0.2–1)
BILIRUB INDIRECT FLD-MCNC: 0.8 MG/DL — SIGNIFICANT CHANGE UP (ref 0.2–1)
BILIRUB SERPL-MCNC: 1.8 MG/DL — HIGH (ref 0.2–1.2)
BILIRUB SERPL-MCNC: 2 MG/DL — HIGH (ref 0.2–1.2)
BUN SERPL-MCNC: 16 MG/DL — SIGNIFICANT CHANGE UP (ref 7–23)
CALCIUM SERPL-MCNC: 8.6 MG/DL — SIGNIFICANT CHANGE UP (ref 8.4–10.5)
CHLORIDE SERPL-SCNC: 108 MMOL/L — SIGNIFICANT CHANGE UP (ref 96–108)
CO2 SERPL-SCNC: 27 MMOL/L — SIGNIFICANT CHANGE UP (ref 22–31)
CREAT SERPL-MCNC: 0.74 MG/DL — SIGNIFICANT CHANGE UP (ref 0.5–1.3)
GLUCOSE BLDC GLUCOMTR-MCNC: 112 MG/DL — HIGH (ref 70–99)
GLUCOSE BLDC GLUCOMTR-MCNC: 113 MG/DL — HIGH (ref 70–99)
GLUCOSE BLDC GLUCOMTR-MCNC: 131 MG/DL — HIGH (ref 70–99)
GLUCOSE BLDC GLUCOMTR-MCNC: 92 MG/DL — SIGNIFICANT CHANGE UP (ref 70–99)
GLUCOSE SERPL-MCNC: 130 MG/DL — HIGH (ref 70–99)
HCT VFR BLD CALC: 23 % — LOW (ref 34.5–45)
HGB BLD-MCNC: 7 G/DL — CRITICAL LOW (ref 11.5–15.5)
MAGNESIUM SERPL-MCNC: 1.9 MG/DL — SIGNIFICANT CHANGE UP (ref 1.6–2.6)
MCHC RBC-ENTMCNC: 27.3 PG — SIGNIFICANT CHANGE UP (ref 27–34)
MCHC RBC-ENTMCNC: 30.4 G/DL — LOW (ref 32–36)
MCV RBC AUTO: 89.8 FL — SIGNIFICANT CHANGE UP (ref 80–100)
PHOSPHATE SERPL-MCNC: 3 MG/DL — SIGNIFICANT CHANGE UP (ref 2.5–4.5)
PLATELET # BLD AUTO: 258 K/UL — SIGNIFICANT CHANGE UP (ref 150–400)
POTASSIUM SERPL-MCNC: 4.4 MMOL/L — SIGNIFICANT CHANGE UP (ref 3.5–5.3)
POTASSIUM SERPL-SCNC: 4.4 MMOL/L — SIGNIFICANT CHANGE UP (ref 3.5–5.3)
PROT SERPL-MCNC: 6.3 G/DL — SIGNIFICANT CHANGE UP (ref 6–8.3)
RBC # BLD: 2.56 M/UL — LOW (ref 3.8–5.2)
RBC # FLD: 19 % — HIGH (ref 10.3–16.9)
SODIUM SERPL-SCNC: 142 MMOL/L — SIGNIFICANT CHANGE UP (ref 135–145)
WBC # BLD: 7.8 K/UL — SIGNIFICANT CHANGE UP (ref 3.8–10.5)
WBC # FLD AUTO: 7.8 K/UL — SIGNIFICANT CHANGE UP (ref 3.8–10.5)

## 2018-02-09 PROCEDURE — 99233 SBSQ HOSP IP/OBS HIGH 50: CPT | Mod: GC

## 2018-02-09 PROCEDURE — 74176 CT ABD & PELVIS W/O CONTRAST: CPT | Mod: 26

## 2018-02-09 PROCEDURE — 74241: CPT | Mod: 26

## 2018-02-09 PROCEDURE — 99222 1ST HOSP IP/OBS MODERATE 55: CPT

## 2018-02-09 RX ORDER — ACETAMINOPHEN 500 MG
1000 TABLET ORAL ONCE
Qty: 0 | Refills: 0 | Status: COMPLETED | OUTPATIENT
Start: 2018-02-09 | End: 2018-02-09

## 2018-02-09 RX ORDER — ONDANSETRON 8 MG/1
4 TABLET, FILM COATED ORAL ONCE
Qty: 0 | Refills: 0 | Status: COMPLETED | OUTPATIENT
Start: 2018-02-09 | End: 2018-02-09

## 2018-02-09 RX ORDER — I.V. FAT EMULSION 20 G/100ML
0.7 EMULSION INTRAVENOUS
Qty: 50 | Refills: 0 | Status: DISCONTINUED | OUTPATIENT
Start: 2018-02-09 | End: 2018-02-09

## 2018-02-09 RX ORDER — HYDRALAZINE HCL 50 MG
10 TABLET ORAL EVERY 6 HOURS
Qty: 0 | Refills: 0 | Status: DISCONTINUED | OUTPATIENT
Start: 2018-02-09 | End: 2018-02-10

## 2018-02-09 RX ORDER — ELECTROLYTE SOLUTION,INJ
1 VIAL (ML) INTRAVENOUS
Qty: 0 | Refills: 0 | Status: DISCONTINUED | OUTPATIENT
Start: 2018-02-09 | End: 2018-02-09

## 2018-02-09 RX ADMIN — PANTOPRAZOLE SODIUM 40 MILLIGRAM(S): 20 TABLET, DELAYED RELEASE ORAL at 05:38

## 2018-02-09 RX ADMIN — ONDANSETRON 4 MILLIGRAM(S): 8 TABLET, FILM COATED ORAL at 09:54

## 2018-02-09 RX ADMIN — HEPARIN SODIUM 5000 UNIT(S): 5000 INJECTION INTRAVENOUS; SUBCUTANEOUS at 21:40

## 2018-02-09 RX ADMIN — Medication 10 MILLIGRAM(S): at 18:30

## 2018-02-09 RX ADMIN — HEPARIN SODIUM 5000 UNIT(S): 5000 INJECTION INTRAVENOUS; SUBCUTANEOUS at 13:21

## 2018-02-09 RX ADMIN — Medication 1000 MILLIGRAM(S): at 22:20

## 2018-02-09 RX ADMIN — Medication 1000 MILLIGRAM(S): at 03:00

## 2018-02-09 RX ADMIN — Medication 5 MILLIGRAM(S): at 13:10

## 2018-02-09 RX ADMIN — ONDANSETRON 4 MILLIGRAM(S): 8 TABLET, FILM COATED ORAL at 01:51

## 2018-02-09 RX ADMIN — Medication 400 MILLIGRAM(S): at 02:28

## 2018-02-09 RX ADMIN — I.V. FAT EMULSION 20.83 GM/KG/DAY: 20 EMULSION INTRAVENOUS at 18:28

## 2018-02-09 RX ADMIN — HEPARIN SODIUM 5000 UNIT(S): 5000 INJECTION INTRAVENOUS; SUBCUTANEOUS at 05:37

## 2018-02-09 RX ADMIN — Medication 5 MILLIGRAM(S): at 05:38

## 2018-02-09 RX ADMIN — PANTOPRAZOLE SODIUM 40 MILLIGRAM(S): 20 TABLET, DELAYED RELEASE ORAL at 18:28

## 2018-02-09 RX ADMIN — Medication 1 EACH: at 18:30

## 2018-02-09 RX ADMIN — Medication 10 MILLIGRAM(S): at 23:41

## 2018-02-09 RX ADMIN — Medication 400 MILLIGRAM(S): at 21:41

## 2018-02-09 NOTE — PROVIDER CONTACT NOTE (OTHER) - SITUATION
Blood culture bottles collected on 10/9/17 on the gram stain  it shows gram +cocci in  clusters and Dawna Kebede made aware.
Heart rate 109, Oral  temp 101.3 and iRgo Kebede notified.
Hgb 7.0
Monitor tach reported 3X 2.8 long pauses in the row on EKG, patient assimptomatic, VS stable
Patient lethargic in bed. High BP. No change in mental status. She states she is just tired
Pt A&Ox4. Pt sitting on chair and stated to go back to bed by herself. Pt lying down and found IV line on the floor and pt not connected.
Pt BP in the 180s
Pt currently receiving vancomycin.
Pt have 8 beats of V-tach
Vancomycin dose due at 7PM, latest Vanco trough level 21.3, still waiting for team MA/PA order adam  to administer or to hold dose for this PM.
Wound vac dressing peeled off from the top
b/p on right upper cuff  88/60 (66). correlates with lydia 80/58. sp02 93% on RA. patient sitting in the chair not complaining of anything, resting comfortably. urine output is zero.
bp 78/54 (63) temp 101.4 urine output 40ml/hr.
pt's /104 (rechecked), Temp 100.3, , RR 16, O2 Sat 94%RA. pt denies pain, denies feeling of palpitation.
pt's fistula drainage bag leaking out stool.
Newly placed wound vac dressing peeled off
pt was advanced  to have pudding 3x a day, given as ordered however, noted all pudding that pt had taken had came out through her EC fistula. wound dressing was changed.

## 2018-02-09 NOTE — PROGRESS NOTE ADULT - ASSESSMENT
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 Seen by GI, with EGD revealing EJ junction with healed mucosa. Subsequently found to have hyperbilirubinemia and evidence of obstruction on CT abdomen. Transferred to SICU and subsequently underwent percutaneous cholecystostomy by IR. Now improving and stepped back down to [facility]La telemetry unit. Medicine continues to follow for optimization of hypertension.

## 2018-02-09 NOTE — PROVIDER CONTACT NOTE (OTHER) - BACKGROUND
Pt;s Hgb has been running at 7.1 prior. Pt is s/p multiple abdominal surgeries. Has been Hypertensive the past few days, on subq heparin.

## 2018-02-09 NOTE — CONSULT NOTE ADULT - SUBJECTIVE AND OBJECTIVE BOX
Patient is a 57y old  Female who presents with a chief complaint of enterocutaneous fistula (24 Jul 2017 14:15)      HPI:  56F w/PMHx of HTN, gastric bypass in 2002 c/b stricture, corkscrewed sleeve and chronic leak, revised on 11/28/16 with protracted (70-day) postoperative course complicated by MDR infections, a C-diff infection, respiratory compromise due to plugging/PNA/effusions requiring multiple interventions and a trach, as well as a continued leak requiring a draining double-pigtail with stenting performed by GI. 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 Denies CP, SOB, N/V, diarrhea. (24 Jul 2017 14:15)    Uro Consult: Uro called for patient w/ possible urinary incontinence.  Pt reports for the past couple of days she has not been able to control her urine but is able to control defecating.  Pt denies past urological hx but has had seen a Urologist last year for ditropan, because she was leaking.  As per nurse patient is able to control urine, but does not want to wait for a bed pan and just urinates and defecates in the bed.   PT denies fevers, chills, n/v.        Vital Signs Last 24 Hrs  T(C): 36.4 (09 Feb 2018 14:00), Max: 37.3 (08 Feb 2018 21:58)  T(F): 97.5 (09 Feb 2018 14:00), Max: 99.1 (08 Feb 2018 21:58)  HR: 90 (09 Feb 2018 13:23) (82 - 94)  BP: 187/88 (09 Feb 2018 13:23) (148/73 - 187/88)  BP(mean): 126 (09 Feb 2018 13:23) (104 - 126)  RR: 18 (09 Feb 2018 13:23) (17 - 18)  SpO2: 98% (09 Feb 2018 13:23) (90% - 100%)  I&O's Summary    08 Feb 2018 07:01  -  09 Feb 2018 07:00  --------------------------------------------------------  IN: 1740.2 mL / OUT: 728.5 mL / NET: 1011.7 mL    09 Feb 2018 07:01  -  09 Feb 2018 18:16  --------------------------------------------------------  IN: 63 mL / OUT: 255 mL / NET: -192 mL        PE:  Gen: NAD  Abd: Soft non tender non distended  :  clear urine on blanket/towel with fecal matter.      LABS:                        7.0    7.8   )-----------( 258      ( 09 Feb 2018 06:15 )             23.0     02-09    142  |  108  |  16  ----------------------------<  130<H>  4.4   |  27  |  0.74    Ca    8.6      09 Feb 2018 06:15  Phos  3.0     02-09  Mg     1.9     02-09    TPro  x   /  Alb  x   /  TBili  2.0<H>  /  DBili  1.2<H>  /  AST  x   /  ALT  x   /  AlkPhos  x   02-09      Cultures  Culture Results:   No growth (02-06 @ 14:59)      A/P  56 year old female  w/ significant PMHx possible urinary incontinence vs patient not wanting to adhere to ADL's and care.  Pt is afebrile, VSS, with complicated hospital hx. Pt does not admit to urinating and defecating on purpose.     1) Patient is a 57y old  Female who presents with a chief complaint of enterocutaneous fistula (24 Jul 2017 14:15)      HPI:  56F w/PMHx of HTN, gastric bypass in 2002 c/b stricture, corkscrewed sleeve and chronic leak, revised on 11/28/16 with protracted (70-day) postoperative course complicated by MDR infections, a C-diff infection, respiratory compromise due to plugging/PNA/effusions requiring multiple interventions and a trach, as well as a continued leak requiring a draining double-pigtail with stenting performed by GI. 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 Denies CP, SOB, N/V, diarrhea. (24 Jul 2017 14:15)    Uro Consult: Uro called for patient w/ possible urinary incontinence.  Pt reports for the past couple of days she has not been able to control her urine but is able to control defecating.  Pt denies past urological hx but has had seen a Urologist last year for ditropan, because she was leaking.  As per nurse patient is able to control urine, but does not want to wait for a bed pan and just urinates and defecates in the bed.   PT denies fevers, chills, n/v.        Vital Signs Last 24 Hrs  T(C): 36.4 (09 Feb 2018 14:00), Max: 37.3 (08 Feb 2018 21:58)  T(F): 97.5 (09 Feb 2018 14:00), Max: 99.1 (08 Feb 2018 21:58)  HR: 90 (09 Feb 2018 13:23) (82 - 94)  BP: 187/88 (09 Feb 2018 13:23) (148/73 - 187/88)  BP(mean): 126 (09 Feb 2018 13:23) (104 - 126)  RR: 18 (09 Feb 2018 13:23) (17 - 18)  SpO2: 98% (09 Feb 2018 13:23) (90% - 100%)  I&O's Summary    08 Feb 2018 07:01  -  09 Feb 2018 07:00  --------------------------------------------------------  IN: 1740.2 mL / OUT: 728.5 mL / NET: 1011.7 mL    09 Feb 2018 07:01  -  09 Feb 2018 18:16  --------------------------------------------------------  IN: 63 mL / OUT: 255 mL / NET: -192 mL        PE:  Gen: NAD  Abd: Soft non tender non distended  :  clear urine on blanket/towel with fecal matter.      LABS:                        7.0    7.8   )-----------( 258      ( 09 Feb 2018 06:15 )             23.0     02-09    142  |  108  |  16  ----------------------------<  130<H>  4.4   |  27  |  0.74    Ca    8.6      09 Feb 2018 06:15  Phos  3.0     02-09  Mg     1.9     02-09    TPro  x   /  Alb  x   /  TBili  2.0<H>  /  DBili  1.2<H>  /  AST  x   /  ALT  x   /  AlkPhos  x   02-09      Cultures  Culture Results:   No growth (02-06 @ 14:59)      A/P  56 year old female  w/ significant PMHx possible urinary incontinence vs patient not wanting to adhere to ADL's and care.  Pt is afebrile, VSS, with complicated hospital hx. Pt does not admit to urinating and defecating on purpose.     1) Bladder scan post void, if retaining place Yu.   2) G/U will follow

## 2018-02-09 NOTE — PROVIDER CONTACT NOTE (OTHER) - RECOMMENDATIONS
presently, pt resting in bed comfortably, will continue to monitor
labs & 1liter Bolus LR given at bedside after putting pt. back in bed.
no intervention ordered. Next dose of Tylenol at 19:30. will administer later. Team MD/PA agreed of plan.
no order given
Ordered one time IV Tylenol
To change the wound vac dressing
WILL CONTINUE TO MONITOR,,,,
Monitor.
Pt in need of IV peripheral access

## 2018-02-09 NOTE — CHART NOTE - NSCHARTNOTEFT_GEN_A_CORE
Admitting Diagnosis:   Patient is a 57y old  Female who presents with a chief complaint of enterocutaneous fistula (24 Jul 2017 14:15)      PAST MEDICAL & SURGICAL HISTORY:  Reflux  Obesity  DVT (deep venous thrombosis): 2013 neck  Diverticulitis  Hypertension  Elective surgery: abodominal wall surgery  dec 2016  Elective surgery: NOV 2016 gastric bypass revision  Gastric bypass status for obesity: gastric sleeve, 6/2012  S/P breast biopsy: 2011, left  S/P colon resection: 2011  S/P knee surgery: repair 2009, 2011  right; left  Umbilical hernia: 2000  S/P appendectomy: 1975  S/P tonsillectomy: 1967    Current Nutrition Order:  NPO  >> TPN via central venous line:  1500 TV (63ml/hr); 240g D, 93g AA, 50g 20% lipids (1688kcal, 1.78g/kg IBW pro, GIR 2.36 -based on most recent wt taken)  *See recs below for 12 hour TPN.    PO Intake: Good (%) [   ]  Fair (50-75%) [   ] Poor (<25%) [   ] -N/A had small amount of ice chips this am and reported nausea. Pt asking for water as she is no longer nauseous.    GI Issues:   Denies nausea/vomiting at present- had experienced some nausea this am after having a small amount of ice chips.   Per RN pt has had two watery stools today.  RUQ perc steven drain in place draining bilious fluid.  NGT to low continuous suction; output 100cc.  ostomy w/good function per MD note    Pain: Pain being medically managed. Pain controlled.    Skin Integrity: surgical sites from s/p repair and closure of entercutaneous fistula. Drain placed. Jigar score of 13. Skin moist. Sacrum pressure ulcer.     Labs:   02-09    142  |  108  |  16  ----------------------------<  130<H>  4.4   |  27  |  0.74    Ca    8.6      09 Feb 2018 06:15  Phos  3.0     02-09  Mg     1.9     02-09    TPro  x   /  Alb  x   /  TBili  2.0<H>  /  DBili  1.2<H>  /  AST  x   /  ALT  x   /  AlkPhos  x   02-09    CAPILLARY BLOOD GLUCOSE      POCT Blood Glucose.: 112 mg/dL (09 Feb 2018 11:15)  POCT Blood Glucose.: 131 mg/dL (09 Feb 2018 06:14)  POCT Blood Glucose.: 129 mg/dL (08 Feb 2018 21:52)  POCT Blood Glucose.: 111 mg/dL (08 Feb 2018 17:42)      Medications:  MEDICATIONS  (STANDING):  bisacodyl Suppository 10 milliGRAM(s) Rectal daily  diazepam  Injectable 5 milliGRAM(s) IV Push at bedtime  fat emulsion (Plant Based) 20% Infusion 0.7 Gm/kG/Day (20.83 mL/Hr) IV Continuous <Continuous>  heparin  flush 100 Units/mL Injectable 100 Unit(s) IV Push every other day  heparin  Injectable 5000 Unit(s) SubCutaneous every 8 hours  hydrALAZINE Injectable 10 milliGRAM(s) IV Push every 6 hours  insulin lispro (HumaLOG) corrective regimen sliding scale   SubCutaneous Before meals and at bedtime  pantoprazole  Injectable 40 milliGRAM(s) IV Push two times a day  Parenteral Nutrition - Adult 1 Each (63 mL/Hr) TPN Continuous <Continuous>  Parenteral Nutrition - Adult 1 Each (63 mL/Hr) TPN Continuous <Continuous>  sodium chloride 3%  Inhalation 4 milliLiter(s) Inhalation once    MEDICATIONS  (PRN):  labetalol Injectable 10 milliGRAM(s) IV Push every 2 hours PRN Systolic blood pressure >160    Weight:  Most recent wt taken pre-op is 155.1lb (1/30)  Would like to obtain new standing wait to compare to previous standing wt trends. Suspect current BW would be inaccurate 2/2 generalized edema    Weight Change:   155.1lb (1/30)  174.8lb (1/15)  175.1lb (1/8/18)  172.5lb (12/19)  171.5lb (12/14)  167lb (11/13)  164lb (10/17)  219lb (8/1)    Estimated energy needs using 52 kg IBW:  increased needs per wound, pressure ulcer and post-op.   30-35 kcal/kg (7176-5829 kcal).   1.8-2 g/kg ( g protein).  30-35 mL/kg (0391-8264 mL fluid).     Subjective:   now s/p EC fistula take down and subsequent revision of her aamir-en-y (1/30). transferred back to SICU 2/5 for jaundice + bowel distension found to have functional biliary obstruction s/p perc cholecystostomy placement. Pt made NPO 2/2 bilious emesis and dudenal decompression. NGT ordered for 240g Dex, 93g AA, 50g 20% lipids (1688kcal, 1.78g/kg IBW pro, GIR 2.36 -based on most recent wt taken). Pt seen resting in bed. RD performed NFPE- pt has generalized edema making it difficult to detect muscle wasting/fat loss. Pre-alb 14, No CRP in labs to assess for inflammatory response vs PCM,  (high but still appropriate, monitor TG and consider 20g 20% lipids in TPN order if TG trends up). BG trending 100-149. Noted most recent wt taken from 1/30 indicates a 20lb wt loss in ~2 weeks- will attempt to follow up to obtain standing wt when patient is agreeable. Per discussion with team, pt is to switch over to 12 hour TPN rate. See recs below.     Previous Nutrition Diagnosis:  Increased nutrient needs RT increased demand for nutrients AEB wound and pressure ulcer healing     Active [  x ]  Resolved [   ]  Adjusted PES:  If resolved, new PES: Increased nutrient needs RT increased demand for nutrients AEB wound, pressure ulcer healing and s/p EC fistula take down and subsequent revision of her aamir-en-y     Goal: Pt to meet >75% estimated needs PO.     Recommendations:  1)  Per discussion with team. Pt is to cycle 12 hour TPN 240g D, 104g AA, 50g 20% lipids TIW (1732kcal, 2g/kg IBW pro, GIR over 12 hours: 4.73)  >> TPN should be ramped up the first hour and ramped down last hour (bag infusing at half the rate)  2) Monitor pre-alb, BG/FS, triglycerides weekly.   3) Trend TG and adjust lipids PRN per MD discretion. If trending up, consider 20g 20% lipids vs 50g.   4) Please obtain standing weight when feasible.  5) Continue w/ MVI in TPN bag    Education: Pt requires continuous education on NPO status. Understands purpose of TPN.     Risk Level: High [  x ] Moderate [   ] Low [   ] Admitting Diagnosis:   Patient is a 57y old  Female who presents with a chief complaint of enterocutaneous fistula (24 Jul 2017 14:15)      PAST MEDICAL & SURGICAL HISTORY:  Reflux  Obesity  DVT (deep venous thrombosis): 2013 neck  Diverticulitis  Hypertension  Elective surgery: abodominal wall surgery  dec 2016  Elective surgery: NOV 2016 gastric bypass revision  Gastric bypass status for obesity: gastric sleeve, 6/2012  S/P breast biopsy: 2011, left  S/P colon resection: 2011  S/P knee surgery: repair 2009, 2011  right; left  Umbilical hernia: 2000  S/P appendectomy: 1975  S/P tonsillectomy: 1967    Current Nutrition Order:  NPO  >> TPN via central venous line:  1500 TV (63ml/hr); 240g D, 93g AA, 50g 20% lipids (1688kcal, 1.78g/kg IBW pro, GIR 2.36 -based on most recent wt taken)  *See recs below for 12 hour TPN.    PO Intake: Good (%) [   ]  Fair (50-75%) [   ] Poor (<25%) [   ] -N/A had small amount of ice chips this am and reported nausea. Pt asking for water as she is no longer nauseous.    GI Issues:   Denies nausea/vomiting at present- had experienced some nausea this am after having a small amount of ice chips.   Per RN pt has had two watery stools today.  RUQ perc steven drain in place draining bilious fluid.  NGT to low continuous suction; output 100cc.  ostomy w/good function per MD note    Pain: Pain being medically managed. Pain controlled.    Skin Integrity: surgical sites from s/p repair and closure of entercutaneous fistula. Drain placed. Jigar score of 13. Skin moist. Sacrum pressure ulcer.     Labs:   02-09    142  |  108  |  16  ----------------------------<  130<H>  4.4   |  27  |  0.74    Ca    8.6      09 Feb 2018 06:15  Phos  3.0     02-09  Mg     1.9     02-09    TPro  x   /  Alb  x   /  TBili  2.0<H>  /  DBili  1.2<H>  /  AST  x   /  ALT  x   /  AlkPhos  x   02-09    CAPILLARY BLOOD GLUCOSE      POCT Blood Glucose.: 112 mg/dL (09 Feb 2018 11:15)  POCT Blood Glucose.: 131 mg/dL (09 Feb 2018 06:14)  POCT Blood Glucose.: 129 mg/dL (08 Feb 2018 21:52)  POCT Blood Glucose.: 111 mg/dL (08 Feb 2018 17:42)      Medications:  MEDICATIONS  (STANDING):  bisacodyl Suppository 10 milliGRAM(s) Rectal daily  diazepam  Injectable 5 milliGRAM(s) IV Push at bedtime  fat emulsion (Plant Based) 20% Infusion 0.7 Gm/kG/Day (20.83 mL/Hr) IV Continuous <Continuous>  heparin  flush 100 Units/mL Injectable 100 Unit(s) IV Push every other day  heparin  Injectable 5000 Unit(s) SubCutaneous every 8 hours  hydrALAZINE Injectable 10 milliGRAM(s) IV Push every 6 hours  insulin lispro (HumaLOG) corrective regimen sliding scale   SubCutaneous Before meals and at bedtime  pantoprazole  Injectable 40 milliGRAM(s) IV Push two times a day  Parenteral Nutrition - Adult 1 Each (63 mL/Hr) TPN Continuous <Continuous>  Parenteral Nutrition - Adult 1 Each (63 mL/Hr) TPN Continuous <Continuous>  sodium chloride 3%  Inhalation 4 milliLiter(s) Inhalation once    MEDICATIONS  (PRN):  labetalol Injectable 10 milliGRAM(s) IV Push every 2 hours PRN Systolic blood pressure >160    Weight:  Most recent wt taken pre-op is 155.1lb (1/30)  Would like to obtain new standing wait to compare to previous standing wt trends. Suspect current BW would be inaccurate 2/2 generalized edema    Weight Change:   155.1lb (1/30)  174.8lb (1/15)  175.1lb (1/8/18)  172.5lb (12/19)  171.5lb (12/14)  167lb (11/13)  164lb (10/17)  219lb (8/1)    Estimated energy needs using 52 kg IBW:  increased needs per wound, pressure ulcer and post-op.   30-35 kcal/kg (9554-6017 kcal).   1.8-2 g/kg ( g protein).  30-35 mL/kg (2187-4085 mL fluid).     Subjective:   now s/p EC fistula take down and subsequent revision of her aamir-en-y (1/30). transferred back to SICU 2/5 for jaundice + bowel distension found to have functional biliary obstruction s/p perc cholecystostomy placement. Pt made NPO 2/2 bilious emesis and dudenal decompression. NGT ordered for 240g Dex, 93g AA, 50g 20% lipids (1688kcal, 1.78g/kg IBW pro, GIR 2.36 -based on most recent wt taken). Pt seen resting in bed. RD performed NFPE- pt has generalized edema making it difficult to detect muscle wasting/fat loss. Pre-alb 14, No CRP in labs to assess for inflammatory response vs PCM,  (high but still appropriate, monitor TG and consider 20g 20% lipids in TPN order if TG trends up). BG trending 100-149. Noted most recent wt taken from 1/30 indicates a 20lb wt loss in ~2 weeks- will attempt to follow up to obtain standing wt when patient is agreeable. Per discussion with team, pt is to switch over to 12 hour TPN rate. See recs below.     Previous Nutrition Diagnosis:  Increased nutrient needs RT increased demand for nutrients AEB wound and pressure ulcer healing     Active [  x ]  Resolved [   ]  Adjusted PES:  If resolved, new PES: Increased nutrient needs RT increased demand for nutrients AEB wound, pressure ulcer healing and s/p EC fistula take down and subsequent revision of her aamir-en-y     Goal: Pt to meet >75% estimated needs PO.     Recommendations:  1)  Per discussion with team. Pt is to cycle 12 hour TPN 240g D, 104g AA, 50g 20% lipids TIW (1732kcal, 2g/kg IBW pro, GIR over 12 hours: 4.73)  >> TPN should be ramped up the first hour and ramped down last hour (bag infusing at half the rate first hour, at full rate 10 hours and half rate last hour)  2) Monitor pre-alb, BG/FS, triglycerides weekly.   3) Trend TG and adjust lipids PRN per MD discretion. If trending up, consider 20g 20% lipids vs 50g.   4) Please obtain standing weight when feasible.  5) Continue w/ MVI in TPN bag    Education: Pt requires continuous education on NPO status. Understands purpose of TPN.     Risk Level: High [  x ] Moderate [   ] Low [   ] Admitting Diagnosis:   Patient is a 57y old  Female who presents with a chief complaint of enterocutaneous fistula (24 Jul 2017 14:15)      PAST MEDICAL & SURGICAL HISTORY:  Reflux  Obesity  DVT (deep venous thrombosis): 2013 neck  Diverticulitis  Hypertension  Elective surgery: abodominal wall surgery  dec 2016  Elective surgery: NOV 2016 gastric bypass revision  Gastric bypass status for obesity: gastric sleeve, 6/2012  S/P breast biopsy: 2011, left  S/P colon resection: 2011  S/P knee surgery: repair 2009, 2011  right; left  Umbilical hernia: 2000  S/P appendectomy: 1975  S/P tonsillectomy: 1967    Current Nutrition Order:  NPO  >> TPN via central venous line:  1500 TV (63ml/hr); 240g D, 93g AA, 50g 20% lipids (1688kcal, 1.78g/kg IBW pro, GIR 2.36 -based on most recent wt taken)  *See recs below for 12 hour TPN.    PO Intake: Good (%) [   ]  Fair (50-75%) [   ] Poor (<25%) [   ] -N/A had small amount of ice chips this am and reported nausea. Pt asking for water as she is no longer nauseous.    GI Issues:   Denies nausea/vomiting at present- had experienced some nausea this am after having a small amount of ice chips.   Per RN pt has had two watery stools today.  RUQ perc steven drain in place draining bilious fluid.    Pain: Pain being medically managed. Pain controlled.    Skin Integrity: surgical sites from s/p repair and closure of entercutaneous fistula. Drain placed. Jigar score of 13. Skin moist. Sacrum pressure ulcer.     Labs:   02-09    142  |  108  |  16  ----------------------------<  130<H>  4.4   |  27  |  0.74    Ca    8.6      09 Feb 2018 06:15  Phos  3.0     02-09  Mg     1.9     02-09    TPro  x   /  Alb  x   /  TBili  2.0<H>  /  DBili  1.2<H>  /  AST  x   /  ALT  x   /  AlkPhos  x   02-09    CAPILLARY BLOOD GLUCOSE      POCT Blood Glucose.: 112 mg/dL (09 Feb 2018 11:15)  POCT Blood Glucose.: 131 mg/dL (09 Feb 2018 06:14)  POCT Blood Glucose.: 129 mg/dL (08 Feb 2018 21:52)  POCT Blood Glucose.: 111 mg/dL (08 Feb 2018 17:42)      Medications:  MEDICATIONS  (STANDING):  bisacodyl Suppository 10 milliGRAM(s) Rectal daily  diazepam  Injectable 5 milliGRAM(s) IV Push at bedtime  fat emulsion (Plant Based) 20% Infusion 0.7 Gm/kG/Day (20.83 mL/Hr) IV Continuous <Continuous>  heparin  flush 100 Units/mL Injectable 100 Unit(s) IV Push every other day  heparin  Injectable 5000 Unit(s) SubCutaneous every 8 hours  hydrALAZINE Injectable 10 milliGRAM(s) IV Push every 6 hours  insulin lispro (HumaLOG) corrective regimen sliding scale   SubCutaneous Before meals and at bedtime  pantoprazole  Injectable 40 milliGRAM(s) IV Push two times a day  Parenteral Nutrition - Adult 1 Each (63 mL/Hr) TPN Continuous <Continuous>  Parenteral Nutrition - Adult 1 Each (63 mL/Hr) TPN Continuous <Continuous>  sodium chloride 3%  Inhalation 4 milliLiter(s) Inhalation once    MEDICATIONS  (PRN):  labetalol Injectable 10 milliGRAM(s) IV Push every 2 hours PRN Systolic blood pressure >160    Weight:  Most recent wt taken pre-op is 155.1lb (1/30)  Would like to obtain new standing wait to compare to previous standing wt trends. Suspect current BW would be inaccurate 2/2 generalized edema    Weight Change:   155.1lb (1/30)  174.8lb (1/15)  175.1lb (1/8/18)  172.5lb (12/19)  171.5lb (12/14)  167lb (11/13)  164lb (10/17)  219lb (8/1)    Estimated energy needs using 52 kg IBW:  increased needs per wound, pressure ulcer and post-op.   30-35 kcal/kg (6952-4877 kcal).   1.8-2 g/kg ( g protein).  30-35 mL/kg (6938-5282 mL fluid).     Subjective:   now s/p EC fistula take down and subsequent revision of her aamir-en-y (1/30). transferred back to SICU 2/5 for jaundice + bowel distension found to have functional biliary obstruction s/p perc cholecystostomy placement. Pt made NPO 2/2 bilious emesis and dudenal decompression. TPN ordered for 240g Dex, 93g AA, 50g 20% lipids (1688kcal, 1.78g/kg IBW pro, GIR 2.36 -based on most recent wt taken). Pt seen resting in bed. RD performed NFPE- pt has generalized edema making it difficult to detect muscle wasting/fat loss. Pre-alb 14, No CRP in labs to assess for inflammatory response vs PCM,  (high but still appropriate, monitor TG and consider 20g 20% lipids in TPN order if TG trends up). BG trending 100-149. Noted most recent wt taken from 1/30 indicates a 20lb wt loss in ~2 weeks- will attempt to follow up to obtain standing wt when patient is agreeable. Per discussion with team, pt is to switch over to 12 hour TPN rate. See recs below.     Previous Nutrition Diagnosis:  Increased nutrient needs RT increased demand for nutrients AEB wound and pressure ulcer healing     Active [  x ]  Resolved [   ]  Adjusted PES:  If resolved, new PES: Increased nutrient needs RT increased demand for nutrients AEB wound, pressure ulcer healing and s/p EC fistula take down and subsequent revision of her aamir-en-y     Goal: Pt to meet >75% estimated needs PO.     Recommendations:  1)  Per discussion with team. Pt is to cycle 12 hour TPN 240g D, 104g AA, 50g 20% lipids TIW (1732kcal, 2g/kg IBW pro, GIR over 12 hours: 4.73)  >> TPN should be ramped up the first hour and ramped down last hour (bag infusing at half the rate first hour, at full rate 10 hours and half rate last hour)  2) Monitor pre-alb, BG/FS, triglycerides weekly.   3) Trend TG and adjust lipids PRN per MD discretion. If trending up, consider 20g 20% lipids vs 50g.   4) Please obtain standing weight when feasible.  5) Continue w/ MVI in TPN bag  Plan of care and nutrition recommendations discussed with team.     Education: Pt requires continuous education on NPO status. Understands purpose of TPN.     Risk Level: High [  x ] Moderate [   ] Low [   ]

## 2018-02-09 NOTE — PROGRESS NOTE ADULT - SUBJECTIVE AND OBJECTIVE BOX
ON: [place]   2/8: TPN reordered, NGT was advanced. small BM. 8 beats of V.tach- cards consulted. NGT output-100cc, echo ordered- EF 60%, worked w/ PT    57 F s/p  esophagojejunostomy revision c/b EC fistula now s/p medical optimization of EC fistula and return to OR for EC fistula take down and subsequent revision of her aamir-en-y (1/30). Transferred back to SICU 2/5 for jaundice + bowel distension found to have functional biliary obstruction s/p perc cholecystostomy placement    Neuro: acetaminophen PRN, zofran prn, diazapam 5 qhs  CV: normotensive goals  Pulm: NC  FENGI: NPO, NGT to LCS, PPI, reglan, supp/enemas, TPN/Lipids  : Yu  Endo: ISS  ID: discontinued zosyn (1/30-2/6), melany (1/30-2/6), Vanc (1/30-1/31)   Ppx: SCDs, SQH  Lines: L IJ (1/.31-) ON: [place]   2/8: TPN reordered, NGT was advanced. small BM. 8 beats of V.tach- cards consulted. NGT output-100cc, echo ordered- EF 60%, worked w/ PT    SUBJECTIVE:  pt seen at bedside, asking for orange juice and jello. without complaints at this time.    MEDICATIONS  (STANDING):  bisacodyl Suppository 10 milliGRAM(s) Rectal daily  diazepam  Injectable 5 milliGRAM(s) IV Push at bedtime  heparin  flush 100 Units/mL Injectable 100 Unit(s) IV Push every other day  heparin  Injectable 5000 Unit(s) SubCutaneous every 8 hours  hydrALAZINE Injectable 5 milliGRAM(s) IV Push every 6 hours  insulin lispro (HumaLOG) corrective regimen sliding scale   SubCutaneous Before meals and at bedtime  pantoprazole  Injectable 40 milliGRAM(s) IV Push two times a day  Parenteral Nutrition - Adult 1 Each (63 mL/Hr) TPN Continuous <Continuous>  sodium chloride 3%  Inhalation 4 milliLiter(s) Inhalation once    MEDICATIONS  (PRN):  labetalol Injectable 10 milliGRAM(s) IV Push every 2 hours PRN Systolic blood pressure >160      Vital Signs Last 24 Hrs  T(C): 36.3 (09 Feb 2018 05:06), Max: 37.3 (08 Feb 2018 21:58)  T(F): 97.3 (09 Feb 2018 05:06), Max: 99.1 (08 Feb 2018 21:58)  HR: 84 (09 Feb 2018 05:16) (72 - 84)  BP: 162/82 (09 Feb 2018 05:16) (148/73 - 186/84)  BP(mean): 113 (09 Feb 2018 05:16) (104 - 127)  RR: 17 (09 Feb 2018 05:16) (17 - 21)  SpO2: 99% (09 Feb 2018 05:16) (91% - 100%)    PHYSICAL EXAM:      Constitutional: A&Ox3    Respiratory: non labored breathing, no respiratory distress    Cardiovascular: NSR, RRR    Gastrointestinal: soft, nondistended, nontender, cholecystostomy tube in place with minimal biliary discharge, CHECO drains in place with serosang fluid    Extremities: (-) edema        I&O's Detail    08 Feb 2018 07:01  -  09 Feb 2018 07:00  --------------------------------------------------------  IN:    Fat Emulsion 20%: 291.2 mL    TPN (Total Parenteral Nutrition): 1386 mL  Total IN: 1677.2 mL    OUT:    Bulb: 41 mL    Bulb: 12.5 mL    Drain: 525 mL    Nasoenteral Tube: 125 mL  Total OUT: 703.5 mL    Total NET: 973.7 mL          LABS:                        7.0    7.8   )-----------( 258      ( 09 Feb 2018 06:15 )             23.0     02-09    142  |  108  |  16  ----------------------------<  130<H>  4.4   |  27  |  0.74    Ca    8.6      09 Feb 2018 06:15  Phos  3.0     02-09  Mg     1.9     02-09    TPro  6.2  /  Alb  2.2<L>  /  TBili  2.2<H>  /  DBili  x   /  AST  25  /  ALT  32  /  AlkPhos  506<H>  02-08          57 F s/p  esophagojejunostomy revision c/b EC fistula now s/p medical optimization of EC fistula and return to OR for EC fistula take down and subsequent revision of her aamir-en-y (1/30). Transferred back to SICU 2/5 for jaundice + bowel distension found to have functional biliary obstruction s/p perc cholecystostomy placement    Neuro: acetaminophen PRN, zofran prn, diazapam 5 qhs  CV: normotensive goals  Pulm: NC  FENGI: NPO, NGT to LCS, PPI, reglan, supp/enemas, TPN/Lipids  : Yu  Endo: ISS  ID: discontinued zosyn (1/30-2/6), melany (1/30-2/6), Vanc (1/30-1/31)   Ppx: SCDs, SQH  Lines: L IJ (1/.31-)

## 2018-02-09 NOTE — PROGRESS NOTE ADULT - PROBLEM SELECTOR PLAN 2
8 beats of VTach overnight. Resolved without intervention. LIkely 2/2 general medical condition.   -Cardiology consulted by primary team.  -Monitor. 8 beats of VTach overnight. Resolved without intervention. LIkely 2/2 general medical condition.   -Cardiology consulted by primary team.  -Replete electrolytes for goal K >= 4 and Mg >=2.  -Patient would likely benefit from transfusion. Goal  Hgb > 7.  -Monitor.

## 2018-02-09 NOTE — PROGRESS NOTE ADULT - SUBJECTIVE AND OBJECTIVE BOX
SUBJECTIVE:    CHIEF COMPLAINT:  Patient is a 57y old  Female who presents with a chief complaint of enterocutaneous fistula (24 Jul 2017 14:15)      OVERNIGHT EVENTS:      INTERVAL HX:  Patient was seen and examined at bedside.     REVIEW OF SYSTEMS:  10pt ROS negative except as per HPI    OBJECTIVE:    Vital Signs Last 24 Hrs  T(C): 36.3 (09 Feb 2018 09:00), Max: 37.3 (08 Feb 2018 21:58)  T(F): 97.3 (09 Feb 2018 09:00), Max: 99.1 (08 Feb 2018 21:58)  HR: 90 (09 Feb 2018 09:47) (72 - 90)  BP: 173/84 (09 Feb 2018 09:47) (148/73 - 185/88)  BP(mean): 121 (09 Feb 2018 09:47) (104 - 127)  RR: 18 (09 Feb 2018 09:47) (17 - 20)  SpO2: 98% (09 Feb 2018 09:47) (91% - 100%)    Height (cm): 160 (08-01 @ 10:08)  Weight (kg): 70.477 (01-30 @ 07:02)  BMI (kg/m2): 27.5 (01-30 @ 07:02)    CAPILLARY BLOOD GLUCOSE      POCT Blood Glucose.: 131 mg/dL (09 Feb 2018 06:14)  POCT Blood Glucose.: 129 mg/dL (08 Feb 2018 21:52)  POCT Blood Glucose.: 111 mg/dL (08 Feb 2018 17:42)  POCT Blood Glucose.: 149 mg/dL (08 Feb 2018 11:10)      PHYSICAL EXAM:  Gen:       well-appearing, no acute distress  Skin:       warm, dry, no rash  HEENT:  moist mucous membranes, oropharynx clear, nares clear, no septal deviation  Neck:     supple, no jugular venous distention, no thyromegaly or goiter  Cardiac: regular rate and rhythm, S1/S2 present, no murmur/gallop/rub  Pulm:     clear to auscultation bilaterally, no wheezes/crackles  Abd: Soft, non-tender, non-distended. Normoactive bowel sounds. Cholecystostomy tube in place, draining bilious fluid. Surgical drain in place draining  serosanguinous fluid. Surgical incision c/d/i.  Vasc:      warm and well perfused, 2+ dorsalis pedis and radial pulses, trace pedal edema  Ext:         normal range of motion, atraumatic  Neuro:   awake, alert and orient x3, Cranial Nerves II-XII grossly intact, no focal deficits      MEDICATIONS  (STANDING):  bisacodyl Suppository 10 milliGRAM(s) Rectal daily  diazepam  Injectable 5 milliGRAM(s) IV Push at bedtime  fat emulsion (Plant Based) 20% Infusion 0.7 Gm/kG/Day (20.556 mL/Hr) IV Continuous <Continuous>  heparin  flush 100 Units/mL Injectable 100 Unit(s) IV Push every other day  heparin  Injectable 5000 Unit(s) SubCutaneous every 8 hours  hydrALAZINE Injectable 5 milliGRAM(s) IV Push every 6 hours  insulin lispro (HumaLOG) corrective regimen sliding scale   SubCutaneous Before meals and at bedtime  pantoprazole  Injectable 40 milliGRAM(s) IV Push two times a day  Parenteral Nutrition - Adult 1 Each (63 mL/Hr) TPN Continuous <Continuous>  Parenteral Nutrition - Adult 1 Each (63 mL/Hr) TPN Continuous <Continuous>  sodium chloride 3%  Inhalation 4 milliLiter(s) Inhalation once    MEDICATIONS  (PRN):  labetalol Injectable 10 milliGRAM(s) IV Push every 2 hours PRN Systolic blood pressure >160      Allergies  sulfa drugs (Unknown)  sulfamethoxazole (Other)    LABS:                        7.0    7.8   )-----------( 258      ( 09 Feb 2018 06:15 )             23.0    Mean Cell Volume: 89.8 fL (02-09 @ 06:15)    02-09    142  |  108  |  16  ----------------------------<  130<H>  4.4   |  27  |  0.74    Ca    8.6      09 Feb 2018 06:15  Phos  3.0     02-09  Mg     1.9     02-09    TPro  6.2  /  Alb  2.2<L>  /  TBili  2.2<H>  /  DBili  x   /  AST  25  /  ALT  32  /  AlkPhos  506<H>  02-08          MICROBIOLOGY: REVIEWED      RADIOLOGY: REVIEWED SUBJECTIVE:    CHIEF COMPLAINT:  Patient is a 57y old  Female who presents with a chief complaint of enterocutaneous fistula (24 Jul 2017 14:15)    INTERVAL HX:  Patient was seen and examined at bedside. Now taking ice chips, but with some nausea. Improved with Zofran. Requesting orange juice. No headache, SOB, chest pain, palpitations. 8 beats of VTach overnight.    REVIEW OF SYSTEMS:  10pt ROS negative except as per HPI    OBJECTIVE:    Vital Signs Last 24 Hrs  T(C): 36.3 (09 Feb 2018 09:00), Max: 37.3 (08 Feb 2018 21:58)  T(F): 97.3 (09 Feb 2018 09:00), Max: 99.1 (08 Feb 2018 21:58)  HR: 90 (09 Feb 2018 09:47) (72 - 90)  BP: 173/84 (09 Feb 2018 09:47) (148/73 - 185/88)  BP(mean): 121 (09 Feb 2018 09:47) (104 - 127)  RR: 18 (09 Feb 2018 09:47) (17 - 20)  SpO2: 98% (09 Feb 2018 09:47) (91% - 100%)    Height (cm): 160 (08-01 @ 10:08)  Weight (kg): 70.477 (01-30 @ 07:02)  BMI (kg/m2): 27.5 (01-30 @ 07:02)    CAPILLARY BLOOD GLUCOSE    POCT Blood Glucose.: 131 mg/dL (09 Feb 2018 06:14)  POCT Blood Glucose.: 129 mg/dL (08 Feb 2018 21:52)  POCT Blood Glucose.: 111 mg/dL (08 Feb 2018 17:42)  POCT Blood Glucose.: 149 mg/dL (08 Feb 2018 11:10)      PHYSICAL EXAM:  Gen:       well-appearing, no acute distress  Skin:       warm, dry, no rash  HEENT:  moist mucous membranes, oropharynx clear, nares clear, no septal deviation  Neck:     supple, no jugular venous distention, no thyromegaly or goiter  Cardiac: regular rate and rhythm, S1/S2 present, no murmur/gallop/rub  Pulm:     clear to auscultation bilaterally, no wheezes/crackles  Abd: Soft, non-tender, non-distended. Normoactive bowel sounds. Cholecystostomy tube in place, draining bilious fluid. Surgical drain in place draining  serosanguinous fluid. Surgical incision c/d/i.  Vasc:      warm and well perfused, 2+ dorsalis pedis and radial pulses, trace pedal edema  Ext:         normal range of motion, atraumatic  Neuro:   awake, alert and orient x3, Cranial Nerves II-XII grossly intact, no focal deficits      MEDICATIONS  (STANDING):  bisacodyl Suppository 10 milliGRAM(s) Rectal daily  diazepam  Injectable 5 milliGRAM(s) IV Push at bedtime  fat emulsion (Plant Based) 20% Infusion 0.7 Gm/kG/Day (20.556 mL/Hr) IV Continuous <Continuous>  heparin  flush 100 Units/mL Injectable 100 Unit(s) IV Push every other day  heparin  Injectable 5000 Unit(s) SubCutaneous every 8 hours  hydrALAZINE Injectable 5 milliGRAM(s) IV Push every 6 hours  insulin lispro (HumaLOG) corrective regimen sliding scale   SubCutaneous Before meals and at bedtime  pantoprazole  Injectable 40 milliGRAM(s) IV Push two times a day  Parenteral Nutrition - Adult 1 Each (63 mL/Hr) TPN Continuous <Continuous>  Parenteral Nutrition - Adult 1 Each (63 mL/Hr) TPN Continuous <Continuous>  sodium chloride 3%  Inhalation 4 milliLiter(s) Inhalation once    MEDICATIONS  (PRN):  labetalol Injectable 10 milliGRAM(s) IV Push every 2 hours PRN Systolic blood pressure >160      Allergies  sulfa drugs (Unknown)  sulfamethoxazole (Other)    LABS:                        7.0    7.8   )-----------( 258      ( 09 Feb 2018 06:15 )             23.0    Mean Cell Volume: 89.8 fL (02-09 @ 06:15)    02-09    142  |  108  |  16  ----------------------------<  130<H>  4.4   |  27  |  0.74    Ca    8.6      09 Feb 2018 06:15  Phos  3.0     02-09  Mg     1.9     02-09    TPro  6.2  /  Alb  2.2<L>  /  TBili  2.2<H>  /  DBili  x   /  AST  25  /  ALT  32  /  AlkPhos  506<H>  02-08    MICROBIOLOGY: REVIEWED      RADIOLOGY: REVIEWED

## 2018-02-09 NOTE — PROGRESS NOTE ADULT - PROBLEM SELECTOR PLAN 1
Improved on standing hydralazine. Previously well controlled on losartan. Unable to take oral meds at this time.  Can continue labetalol IV pushes prn.  -Increase hydralazine 10mg IV q6h and hold for systolic BP < 130 or HR > 100. Improved on standing hydralazine. Previously well controlled on losartan. Unable to take oral meds at this time.  Can continue labetalol IV pushes prn.  -Increase hydralazine 10mg IV q6h and hold for systolic BP < 130 or HR > 100.  -Goal BP < 140/90. Improved on standing hydralazine. Previously well controlled on losartan. Unable to take oral meds at this time.  Can continue labetalol IV pushes prn.  -Increase hydralazine 10mg IV q6h and hold for systolic BP < 130 or HR > 100.  -Goal BP less than or equal to 140/90.

## 2018-02-09 NOTE — CONSULT NOTE ADULT - SUBJECTIVE AND OBJECTIVE BOX
AISSATOU DAVIS    [id number]    Patient is a 57y old  Female who presents with a chief complaint of enterocutaneous fistula (24 Jul 2017 14:15)      HPI:  56F w/PMHx of HTN, gastric bypass in 2002 c/b stricture, corkscrewed sleeve and chronic leak, revised on 11/28/16 with protracted (70-day) postoperative course complicated by MDR infections, a C-diff infection, respiratory compromise due to plugging/PNA/effusions requiring multiple interventions and a trach, as well as a continued leak requiring a draining double-pigtail with stenting performed by GI. 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 Denies CP, SOB, N/V, diarrhea. (24 Jul 2017 14:15)      PAST MEDICAL & SURGICAL HISTORY:  Reflux  Obesity  DVT (deep venous thrombosis): 2013 neck  Diverticulitis  Hypertension  Elective surgery: abodominal wall surgery  dec 2016  Elective surgery: NOV 2016 gastric bypass revision  Gastric bypass status for obesity: gastric sleeve, 6/2012  S/P breast biopsy: 2011, left  S/P colon resection: 2011  S/P knee surgery: repair 2009, 2011  right; left  Umbilical hernia: 2000  S/P appendectomy: 1975  S/P tonsillectomy: 1967        Social History:    FAMILY HISTORY:  No pertinent family history in first degree relatives      Functional Level Prior to Admission:                          7.0    7.8   )-----------( 258      ( 09 Feb 2018 06:15 )             23.0       02-09    142  |  108  |  16  ----------------------------<  130<H>  4.4   |  27  |  0.74    Ca    8.6      09 Feb 2018 06:15  Phos  3.0     02-09  Mg     1.9     02-09    TPro  x   /  Alb  x   /  TBili  2.0<H>  /  DBili  1.2<H>  /  AST  x   /  ALT  x   /  AlkPhos  x   02-09      Vital Signs Last 24 Hrs  T(C): 36.4 (09 Feb 2018 14:00), Max: 37.3 (08 Feb 2018 21:58)  T(F): 97.5 (09 Feb 2018 14:00), Max: 99.1 (08 Feb 2018 21:58)  HR: 90 (09 Feb 2018 13:23) (82 - 90)  BP: 187/88 (09 Feb 2018 13:23) (148/73 - 187/88)  BP(mean): 126 (09 Feb 2018 13:23) (104 - 126)  RR: 18 (09 Feb 2018 13:23) (17 - 18)  SpO2: 98% (09 Feb 2018 13:23) (96% - 100%)      PHYSICAL EXAM:  This 57 y-o female was admitted on 07/24/17 with entero-cutaneous fistula.  s/p gastric by pass for obesity in 2002, post ip complication with chrnic leak, MDR infection.  s/p colon resection. Darian knee surgery.  n 07/24 pt under went repair of ventrak hernia / fistulotomy.  08/01/17 abdominal wash and wound closure.       Constitutional:  lying in bed comfortably.  Stable.  c/o thirsty.    Eyes: neg.    ENMT: neg.    Neck: supple, no neck pain    Breasts:    Back:  no back pain    Respiratory: no SOB    Cardiovascular:  no chest pain, no palpitation    Gastrointestinal: no abdominal pain.  Surgical wound dry and clean.  Drainage tube in place    Genitourinary:    Rectal:    Extremities:    FULL rom, 4 extremities, no jont pain, no edema    Vascular:    Neurological: Deconditioned,  3+-4/5 in uppers, 3- hip, 3 - 3+ /5 in knees, Lt. side weaker, ankle 3+-4/5.  Needs maximal assistance of rbed mobility On dailt PT; needs maximal assistance of 2 for transfer and gait training, bed to chair    Skin:    Lymph Nodes:    Musculoskeletal:    Psychiatric:            Impression:  1. Deconditioned.  r/o critically ill neuropathy / myopathy.  3. Obesity  4. s/p multiple abdominal surgeries.    Recommendations:  1. Continue PT for therapeutic ex., bed mobility, transfer and gait training as tolerated.  2. Consider Subacute rehab.  placement

## 2018-02-09 NOTE — PROVIDER CONTACT NOTE (OTHER) - ASSESSMENT
Pt denies peeling off the wound vac dressing. Pt asking for dilaudid before wound vac dressing.
to get out of bed and ambulate. Pt's  was with patient when pt ambulated in hallway
LACTIC ACID LEVEL 4.2 AS REPORTED BY LABORATORY...
Pt seen scratching her abdomen under the reinforced tegaderm. Pt denies peeling off the dressing.
Spoke with DANNI Toney about finding
Troph level was 28.1
Pt denies any chest pain or discomfort. /85, HR 74, RR 20, O2 sat 99% on 3L NC.
Pt denies any chest pain or discomfort. /88, HR 78, RR 18, O@ 100% on 3L NC
Pt is alert and oriented, no bleeding noted. Pt is in normal sinus rhythm at 87, BP this morning 162/82 which is improved from previous BP's.
Pt lying down in bed, no bleeding noted. Pt c/o abd itching and abd pain.
Team MD along with Dr. Maria had changed wound VAC dressing and fistula with ostomy drainage bag. pt was premedicated with Dilaudid 1mg IV and pt  had tolerated procedure well. presently pt resting in bed comfortably

## 2018-02-09 NOTE — PROGRESS NOTE ADULT - SUBJECTIVE AND OBJECTIVE BOX
58 y/o F s/p cholecystostomy tube placement. No complaints.  Drain output: 525cc/last 24 hours as of this morning. Bilious.  Easily flushed with 5cc NS.

## 2018-02-10 LAB
GLUCOSE BLDC GLUCOMTR-MCNC: 111 MG/DL — HIGH (ref 70–99)
GLUCOSE BLDC GLUCOMTR-MCNC: 132 MG/DL — HIGH (ref 70–99)
GLUCOSE BLDC GLUCOMTR-MCNC: 86 MG/DL — SIGNIFICANT CHANGE UP (ref 70–99)
GLUCOSE BLDC GLUCOMTR-MCNC: 88 MG/DL — SIGNIFICANT CHANGE UP (ref 70–99)

## 2018-02-10 PROCEDURE — 99233 SBSQ HOSP IP/OBS HIGH 50: CPT | Mod: GC

## 2018-02-10 RX ORDER — ONDANSETRON 8 MG/1
4 TABLET, FILM COATED ORAL ONCE
Qty: 0 | Refills: 0 | Status: COMPLETED | OUTPATIENT
Start: 2018-02-10 | End: 2018-02-10

## 2018-02-10 RX ORDER — LOSARTAN POTASSIUM 100 MG/1
50 TABLET, FILM COATED ORAL DAILY
Qty: 0 | Refills: 0 | Status: DISCONTINUED | OUTPATIENT
Start: 2018-02-10 | End: 2018-02-23

## 2018-02-10 RX ORDER — MAGNESIUM SULFATE 500 MG/ML
1 VIAL (ML) INJECTION ONCE
Qty: 0 | Refills: 0 | Status: COMPLETED | OUTPATIENT
Start: 2018-02-10 | End: 2018-02-11

## 2018-02-10 RX ORDER — ELECTROLYTE SOLUTION,INJ
1 VIAL (ML) INTRAVENOUS
Qty: 0 | Refills: 0 | Status: DISCONTINUED | OUTPATIENT
Start: 2018-02-10 | End: 2018-02-10

## 2018-02-10 RX ORDER — ACETAMINOPHEN 500 MG
1000 TABLET ORAL ONCE
Qty: 0 | Refills: 0 | Status: COMPLETED | OUTPATIENT
Start: 2018-02-10 | End: 2018-02-10

## 2018-02-10 RX ADMIN — Medication 100 UNIT(S): at 13:21

## 2018-02-10 RX ADMIN — HEPARIN SODIUM 5000 UNIT(S): 5000 INJECTION INTRAVENOUS; SUBCUTANEOUS at 13:21

## 2018-02-10 RX ADMIN — HEPARIN SODIUM 5000 UNIT(S): 5000 INJECTION INTRAVENOUS; SUBCUTANEOUS at 21:35

## 2018-02-10 RX ADMIN — Medication 1000 MILLIGRAM(S): at 10:45

## 2018-02-10 RX ADMIN — PANTOPRAZOLE SODIUM 40 MILLIGRAM(S): 20 TABLET, DELAYED RELEASE ORAL at 18:17

## 2018-02-10 RX ADMIN — LOSARTAN POTASSIUM 50 MILLIGRAM(S): 100 TABLET, FILM COATED ORAL at 09:24

## 2018-02-10 RX ADMIN — Medication 10 MILLIGRAM(S): at 18:17

## 2018-02-10 RX ADMIN — Medication 400 MILLIGRAM(S): at 10:13

## 2018-02-10 RX ADMIN — ONDANSETRON 4 MILLIGRAM(S): 8 TABLET, FILM COATED ORAL at 18:17

## 2018-02-10 RX ADMIN — Medication 1 EACH: at 18:24

## 2018-02-10 RX ADMIN — PANTOPRAZOLE SODIUM 40 MILLIGRAM(S): 20 TABLET, DELAYED RELEASE ORAL at 06:06

## 2018-02-10 RX ADMIN — Medication 10 MILLIGRAM(S): at 06:06

## 2018-02-10 RX ADMIN — HEPARIN SODIUM 5000 UNIT(S): 5000 INJECTION INTRAVENOUS; SUBCUTANEOUS at 06:06

## 2018-02-10 NOTE — PROGRESS NOTE ADULT - PROBLEM SELECTOR PLAN 7
Albumin 2.1.  -receiving tPA  -Now on clear liquid diet.    Recommendations are preliminary pending Attending review. Albumin 2.1.  -receiving TPN  -Now on clear liquid diet.  -Given bowel surgeries, time npo and now on TPN, would recheck Vitamins A,D,E,K,B12,C, folic acid, copper, and iron studies.    Case discussed with Attending, Dr Freire, and Primary team.

## 2018-02-10 NOTE — PROGRESS NOTE ADULT - PROBLEM SELECTOR PLAN 2
Continues to have occasional short runs of non-sustained VTach.  -Cardiology following, will defer management.  -Replete electrolytes for goal K >= 4 and Mg >=2.  -Please repeat hemoglobin.  -Monitor on telemetry.

## 2018-02-10 NOTE — PROGRESS NOTE ADULT - PROBLEM SELECTOR PLAN 1
Uncontrolled today. Previously well controlled on losartan. Now on Clear liquid diet and cleared for oral medications. HRs borderline tachycardic.  Can continue labetalol IV pushes prn.  -D/c hydralazine and start losartan 50mg qDaily.  -Goal BP less than or equal to 140/90.

## 2018-02-10 NOTE — PROGRESS NOTE ADULT - SUBJECTIVE AND OBJECTIVE BOX
56 y/o F s/p cholecystostomy tube placement. No complaints.  Drain output: 625cc/last 24 hours as of this morning. Bilious.  Easily flushed with 5cc NS.    Will continue to follow.

## 2018-02-10 NOTE — PROGRESS NOTE ADULT - SUBJECTIVE AND OBJECTIVE BOX
SUBJECTIVE:    CHIEF COMPLAINT:  Patient is a 57y old  Female who presents with a chief complaint of enterocutaneous fistula (24 Jul 2017 14:15)    INTERVAL HX:  Patient was seen and examined at bedside. Pleased that she is now receiving food by mouth. Continues on TPN. Now on Clear liquid diet. Cleared by Surgery for oral medications.    REVIEW OF SYSTEMS:  10pt ROS negative except as per HPI    OBJECTIVE:    Vital Signs Last 24 Hrs  T(C): 37.1 (10 Feb 2018 05:15), Max: 37.4 (09 Feb 2018 18:38)  T(F): 98.7 (10 Feb 2018 05:15), Max: 99.4 (09 Feb 2018 18:38)  HR: 98 (10 Feb 2018 06:06) (88 - 108)  BP: 160/76 (10 Feb 2018 06:06) (152/82 - 187/88)  BP(mean): 109 (10 Feb 2018 06:06) (106 - 126)  RR: 20 (10 Feb 2018 06:06) (18 - 20)  SpO2: 96% (10 Feb 2018 06:06) (90% - 98%)    Height (cm): 160 (08-01 @ 10:08)  Weight (kg): 70.477 (01-30 @ 07:02)  BMI (kg/m2): 27.5 (01-30 @ 07:02)    CAPILLARY BLOOD GLUCOSE  POCT Blood Glucose.: 132 mg/dL (10 Feb 2018 05:40)  POCT Blood Glucose.: 113 mg/dL (09 Feb 2018 21:59)  POCT Blood Glucose.: 92 mg/dL (09 Feb 2018 16:59)  POCT Blood Glucose.: 112 mg/dL (09 Feb 2018 11:15)      PHYSICAL EXAM:  Gen:       well-appearing, no acute distress, obese  Skin:       warm, dry, no rash  HEENT:  moist mucous membranes, oropharynx clear, nares clear, no septal deviation  Neck:     supple, no jugular venous distention, no thyromegaly or goiter  Cardiac: regular rate and rhythm, S1/S2 present, no murmur/gallop/rub  Pulm:     clear to auscultation bilaterally, no wheezes/crackles  Abd: Soft, non-tender, non-distended. Normoactive bowel sounds. Cholecystostomy tube in place, draining bilious fluid. Surgical drain in place draining  serosanguinous fluid. Surgical incision c/d/i.  Vasc:      warm and well perfused, 2+ dorsalis pedis and radial pulses, trace pedal edema  Ext:         normal range of motion, atraumatic  Neuro:   awake, alert and orient x3, Cranial Nerves II-XII grossly intact, no focal deficits    MEDICATIONS  (STANDING):  bisacodyl Suppository 10 milliGRAM(s) Rectal daily  diazepam  Injectable 5 milliGRAM(s) IV Push at bedtime  fat emulsion (Plant Based) 20% Infusion 0.7 Gm/kG/Day (20.83 mL/Hr) IV Continuous <Continuous>  heparin  flush 100 Units/mL Injectable 100 Unit(s) IV Push every other day  heparin  Injectable 5000 Unit(s) SubCutaneous every 8 hours  hydrALAZINE Injectable 10 milliGRAM(s) IV Push every 6 hours  insulin lispro (HumaLOG) corrective regimen sliding scale   SubCutaneous Before meals and at bedtime  pantoprazole  Injectable 40 milliGRAM(s) IV Push two times a day  Parenteral Nutrition - Adult 1 Each (63 mL/Hr) TPN Continuous <Continuous>  sodium chloride 3%  Inhalation 4 milliLiter(s) Inhalation once    MEDICATIONS  (PRN):  labetalol Injectable 10 milliGRAM(s) IV Push every 2 hours PRN Systolic blood pressure >160      Allergies  sulfa drugs (Unknown)  sulfamethoxazole (Other)      LABS:                        7.0    7.8   )-----------( 258      ( 09 Feb 2018 06:15 )             23.0      02-09    142  |  108  |  16  ----------------------------<  130<H>  4.4   |  27  |  0.74    Ca    8.6      09 Feb 2018 06:15  Phos  3.0     02-09  Mg     1.9     02-09    TPro  x   /  Alb  x   /  TBili  2.0<H>  /  DBili  1.2<H>  /  AST  x   /  ALT  x   /  AlkPhos  x   02-09      MICROBIOLOGY: REVIEWED      RADIOLOGY: REVIEWED

## 2018-02-10 NOTE — PROGRESS NOTE ADULT - ASSESSMENT
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 Seen by GI, with EGD revealing EJ junction with healed mucosa. Subsequently found to have hyperbilirubinemia and evidence of obstruction on CT abdomen. Transferred to SICU and subsequently underwent percutaneous cholecystostomy by IR. Now improving and stepped back down to 8La telemetry unit. Medicine continues to follow for optimization of hypertension.

## 2018-02-10 NOTE — PROGRESS NOTE ADULT - SUBJECTIVE AND OBJECTIVE BOX
ON: CT completed, advanced to [facility]  2/9: UGI wnl, CT scan pending, R CHECO drain dc'd, adv diet to [facility]D once CT is done, rehab consult-Dr. Agee, TPN reordered-to be given over 12hours overnight, uro consult    STATUS POST:  7/24: SBR resection, fistula resection, revision of esophagogegunostomy drain through esophageal hiatus in R mediastinum  7/29: Ex-lap, SB anastamosis in BP limb breakdown with succus throughout abdomen  8/1: abd washout, drainage of abscess, biologic mesh placement, closure of abdominal wall, vac and retention suture  8/7: abd washout, mesh removal, frozen abd noted, LLQ CHECO replaced with benson drain  8/10: leak noted from previous anastamosis site on L side, mid abdomen malecot drain placed in enterotomy, JPs x 2 placed, necrotic fascia debrided, vicryl mesh sutured to fascia circumferentially, xeroform over mesh then vac dressing placed	  1/30: Ex-lap, resection of EC fistula, SBRx2 with patino handsewn anastomosis, JPx2 (SQ) placement, primary abdominal closure with b/l external oblique release and prevena placement.  3L Crystalloid, and had 550cc UOP and 500cc EBL.

## 2018-02-10 NOTE — PROGRESS NOTE ADULT - PROBLEM SELECTOR PLAN 4
Obstructive 2/2 multiple abdominal surgeries. Now resolved s/p percutaneous cholecystostomy.  -Management as per primary team.

## 2018-02-10 NOTE — PROGRESS NOTE ADULT - PROBLEM SELECTOR PLAN 6
2/2 blood loss from multiple operative procedures and serial phlebotomy. Remains stable.  -Please check CBC. Goal Hgb > 7 to minimize the risk of cardiovascular and neurologic morbidity.  -Maintain active type and screen. Minimize blood draws.

## 2018-02-10 NOTE — PROGRESS NOTE ADULT - ATTENDING COMMENTS
57yoF w/ h/o HTN, gastric 57yoF w/ h/o HTN, gastric bypass w/ multiple complications including fistulae s/p surgical intervention w/ course c/b obstructive jaundice now resolved s/p perc, malnutrition on TPN, nonsustained Vtach followed by cards.  Restart ARB for HTN as tolerating clears.  Vtach management per cards.  Management of surgical wound, drains per surgery.  Rec checking labs per resident note as pt at risk for malabsorption.

## 2018-02-10 NOTE — PROGRESS NOTE ADULT - ASSESSMENT
57 F s/p  esophagojejunostomy revision c/b EC fistula now s/p medical optimization of EC fistula and return to OR for EC fistula take down and subsequent revision of her aamir-en-y (1/30). Transferred back to SICU 2/5 for jaundice + bowel distension found to have functional biliary obstruction s/p perc cholecystostomy placement    Neuro: acetaminophen PRN, zofran prn, diazapam 5 qhs  CV: normotensive goals  Pulm: NC  FENGI: BariCLD, PPI, reglan, supp/enemas, TPN/Lipids  : Voiding  Endo: ISS  ID: discontinued zosyn (1/30-2/6), melany (1/30-2/6), Vanc (1/30-1/31)   Ppx: SCDs, SQH  Lines: L IJ (1/.31-)  Wounds: midline wound with dry gauze dressing. SubQ CHECO x 2, cholecystostomy (2/6-)

## 2018-02-11 LAB
ALBUMIN SERPL ELPH-MCNC: 2.3 G/DL — LOW (ref 3.3–5)
ALP SERPL-CCNC: 559 U/L — HIGH (ref 40–120)
ALT FLD-CCNC: 38 U/L — SIGNIFICANT CHANGE UP (ref 10–45)
ANION GAP SERPL CALC-SCNC: 11 MMOL/L — SIGNIFICANT CHANGE UP (ref 5–17)
AST SERPL-CCNC: 42 U/L — HIGH (ref 10–40)
BILIRUB SERPL-MCNC: 1.6 MG/DL — HIGH (ref 0.2–1.2)
BLD GP AB SCN SERPL QL: NEGATIVE — SIGNIFICANT CHANGE UP
BUN SERPL-MCNC: 16 MG/DL — SIGNIFICANT CHANGE UP (ref 7–23)
CALCIUM SERPL-MCNC: 8.7 MG/DL — SIGNIFICANT CHANGE UP (ref 8.4–10.5)
CHLORIDE SERPL-SCNC: 104 MMOL/L — SIGNIFICANT CHANGE UP (ref 96–108)
CO2 SERPL-SCNC: 23 MMOL/L — SIGNIFICANT CHANGE UP (ref 22–31)
CREAT SERPL-MCNC: 0.7 MG/DL — SIGNIFICANT CHANGE UP (ref 0.5–1.3)
GLUCOSE BLDC GLUCOMTR-MCNC: 100 MG/DL — HIGH (ref 70–99)
GLUCOSE BLDC GLUCOMTR-MCNC: 108 MG/DL — HIGH (ref 70–99)
GLUCOSE BLDC GLUCOMTR-MCNC: 111 MG/DL — HIGH (ref 70–99)
GLUCOSE BLDC GLUCOMTR-MCNC: 116 MG/DL — HIGH (ref 70–99)
GLUCOSE SERPL-MCNC: 120 MG/DL — HIGH (ref 70–99)
HCT VFR BLD CALC: 18.5 % — CRITICAL LOW (ref 34.5–45)
HCT VFR BLD CALC: 22.3 % — LOW (ref 34.5–45)
HGB BLD-MCNC: 5.6 G/DL — CRITICAL LOW (ref 11.5–15.5)
HGB BLD-MCNC: 6.7 G/DL — CRITICAL LOW (ref 11.5–15.5)
MAGNESIUM SERPL-MCNC: 2 MG/DL — SIGNIFICANT CHANGE UP (ref 1.6–2.6)
MCHC RBC-ENTMCNC: 27.7 PG — SIGNIFICANT CHANGE UP (ref 27–34)
MCHC RBC-ENTMCNC: 28.1 PG — SIGNIFICANT CHANGE UP (ref 27–34)
MCHC RBC-ENTMCNC: 30 G/DL — LOW (ref 32–36)
MCHC RBC-ENTMCNC: 30.3 G/DL — LOW (ref 32–36)
MCV RBC AUTO: 92.1 FL — SIGNIFICANT CHANGE UP (ref 80–100)
MCV RBC AUTO: 93 FL — SIGNIFICANT CHANGE UP (ref 80–100)
PHOSPHATE SERPL-MCNC: 2.9 MG/DL — SIGNIFICANT CHANGE UP (ref 2.5–4.5)
PLATELET # BLD AUTO: 302 K/UL — SIGNIFICANT CHANGE UP (ref 150–400)
PLATELET # BLD AUTO: 333 K/UL — SIGNIFICANT CHANGE UP (ref 150–400)
POTASSIUM SERPL-MCNC: 4.4 MMOL/L — SIGNIFICANT CHANGE UP (ref 3.5–5.3)
POTASSIUM SERPL-SCNC: 4.4 MMOL/L — SIGNIFICANT CHANGE UP (ref 3.5–5.3)
PROT SERPL-MCNC: 6.2 G/DL — SIGNIFICANT CHANGE UP (ref 6–8.3)
RBC # BLD: 1.99 M/UL — LOW (ref 3.8–5.2)
RBC # BLD: 2.42 M/UL — LOW (ref 3.8–5.2)
RBC # FLD: 19.8 % — HIGH (ref 10.3–16.9)
RBC # FLD: 20.4 % — HIGH (ref 10.3–16.9)
RH IG SCN BLD-IMP: POSITIVE — SIGNIFICANT CHANGE UP
SODIUM SERPL-SCNC: 138 MMOL/L — SIGNIFICANT CHANGE UP (ref 135–145)
WBC # BLD: 13 K/UL — HIGH (ref 3.8–10.5)
WBC # BLD: 8.1 K/UL — SIGNIFICANT CHANGE UP (ref 3.8–10.5)
WBC # FLD AUTO: 13 K/UL — HIGH (ref 3.8–10.5)
WBC # FLD AUTO: 8.1 K/UL — SIGNIFICANT CHANGE UP (ref 3.8–10.5)

## 2018-02-11 RX ORDER — SODIUM CHLORIDE 9 MG/ML
1000 INJECTION, SOLUTION INTRAVENOUS
Qty: 0 | Refills: 0 | Status: DISCONTINUED | OUTPATIENT
Start: 2018-02-11 | End: 2018-02-12

## 2018-02-11 RX ORDER — HYDRALAZINE HCL 50 MG
10 TABLET ORAL ONCE
Qty: 0 | Refills: 0 | Status: DISCONTINUED | OUTPATIENT
Start: 2018-02-11 | End: 2018-02-11

## 2018-02-11 RX ORDER — HYDRALAZINE HCL 50 MG
10 TABLET ORAL ONCE
Qty: 0 | Refills: 0 | Status: COMPLETED | OUTPATIENT
Start: 2018-02-11 | End: 2018-02-11

## 2018-02-11 RX ORDER — DIAZEPAM 5 MG
5 TABLET ORAL AT BEDTIME
Qty: 0 | Refills: 0 | Status: DISCONTINUED | OUTPATIENT
Start: 2018-02-11 | End: 2018-02-17

## 2018-02-11 RX ORDER — ESCITALOPRAM OXALATE 10 MG/1
20 TABLET, FILM COATED ORAL DAILY
Qty: 0 | Refills: 0 | Status: DISCONTINUED | OUTPATIENT
Start: 2018-02-11 | End: 2018-02-23

## 2018-02-11 RX ORDER — ACETAMINOPHEN 500 MG
650 TABLET ORAL ONCE
Qty: 0 | Refills: 0 | Status: COMPLETED | OUTPATIENT
Start: 2018-02-11 | End: 2018-02-11

## 2018-02-11 RX ORDER — ELECTROLYTE SOLUTION,INJ
1 VIAL (ML) INTRAVENOUS
Qty: 0 | Refills: 0 | Status: DISCONTINUED | OUTPATIENT
Start: 2018-02-11 | End: 2018-02-11

## 2018-02-11 RX ORDER — ACETAMINOPHEN 500 MG
1000 TABLET ORAL ONCE
Qty: 0 | Refills: 0 | Status: COMPLETED | OUTPATIENT
Start: 2018-02-11 | End: 2018-02-11

## 2018-02-11 RX ORDER — LIDOCAINE HCL 20 MG/ML
20 VIAL (ML) INJECTION ONCE
Qty: 0 | Refills: 0 | Status: COMPLETED | OUTPATIENT
Start: 2018-02-11 | End: 2018-02-11

## 2018-02-11 RX ADMIN — Medication 100 GRAM(S): at 01:09

## 2018-02-11 RX ADMIN — Medication 1000 MILLIGRAM(S): at 03:50

## 2018-02-11 RX ADMIN — Medication 1000 MILLIGRAM(S): at 11:00

## 2018-02-11 RX ADMIN — SODIUM CHLORIDE 115 MILLILITER(S): 9 INJECTION, SOLUTION INTRAVENOUS at 21:09

## 2018-02-11 RX ADMIN — HEPARIN SODIUM 5000 UNIT(S): 5000 INJECTION INTRAVENOUS; SUBCUTANEOUS at 21:23

## 2018-02-11 RX ADMIN — Medication 650 MILLIGRAM(S): at 19:25

## 2018-02-11 RX ADMIN — LOSARTAN POTASSIUM 50 MILLIGRAM(S): 100 TABLET, FILM COATED ORAL at 05:27

## 2018-02-11 RX ADMIN — HEPARIN SODIUM 5000 UNIT(S): 5000 INJECTION INTRAVENOUS; SUBCUTANEOUS at 05:26

## 2018-02-11 RX ADMIN — Medication 650 MILLIGRAM(S): at 20:03

## 2018-02-11 RX ADMIN — ESCITALOPRAM OXALATE 20 MILLIGRAM(S): 10 TABLET, FILM COATED ORAL at 12:04

## 2018-02-11 RX ADMIN — PANTOPRAZOLE SODIUM 40 MILLIGRAM(S): 20 TABLET, DELAYED RELEASE ORAL at 05:27

## 2018-02-11 RX ADMIN — Medication 400 MILLIGRAM(S): at 03:27

## 2018-02-11 RX ADMIN — HEPARIN SODIUM 5000 UNIT(S): 5000 INJECTION INTRAVENOUS; SUBCUTANEOUS at 14:00

## 2018-02-11 RX ADMIN — PANTOPRAZOLE SODIUM 40 MILLIGRAM(S): 20 TABLET, DELAYED RELEASE ORAL at 18:00

## 2018-02-11 RX ADMIN — Medication 10 MILLIGRAM(S): at 12:04

## 2018-02-11 RX ADMIN — Medication 400 MILLIGRAM(S): at 10:06

## 2018-02-11 RX ADMIN — Medication 10 MILLIGRAM(S): at 12:49

## 2018-02-11 RX ADMIN — Medication 20 MILLILITER(S): at 19:54

## 2018-02-11 NOTE — PROGRESS NOTE ADULT - ASSESSMENT
57 F s/p  esophagojejunostomy revision c/b EC fistula now s/p medical optimization of EC fistula and return to OR for EC fistula take down and subsequent revision of her aamir-en-y (1/30). Transferred back to SICU 2/5 for jaundice + bowel distension found to have functional biliary obstruction s/p perc cholecystostomy placement    Neuro: acetaminophen PRN, zofran prn, diazapam 5 qhs  CV: normotensive goals  Pulm: NC  FENGI: BariCLD, PPI, reglan, supp/enemas, TPN/Lipids  : Voiding  Endo: ISS  ID: discontinued zosyn (1/30-2/6), melany (1/30-2/6), Vanc (1/30-1/31)   Ppx: SCDs, SQH  Lines: L IJ (1/.31-)  Wounds: midline wound with dry gauze dressing. SubQ CHECO x 2, cholecystostomy (2/6-) 57 F s/p  esophagojejunostomy revision c/b EC fistula now s/p medical optimization of EC fistula and return to OR for EC fistula take down and subsequent revision of her aamir-en-y (1/30). Transferred back to SICU 2/5 for jaundice + bowel distension found to have functional biliary obstruction s/p perc cholecystostomy placement    Neuro: acetaminophen PRN, zofran prn, diazapam 5 qhs   CV: normotensive goals  Pulm: NC  FENGI: BariCLD, PPI, reglan, supp/enemas, TPN/Lipids  : Voiding  Endo: ISS  ID: discontinued zosyn (1/30-2/6), melany (1/30-2/6), Vanc (1/30-1/31)   Ppx: SCDs, SQH  Lines: L IJ (1/.31-)  Wounds: midline wound with dry gauze dressing. SubQ CHECO x 2, cholecystostomy (2/6-)

## 2018-02-11 NOTE — PROGRESS NOTE ADULT - SUBJECTIVE AND OBJECTIVE BOX
o/n: BRAYDEN, VSS  2/10: started valsartan, medicine recs o/n: BRAYDEN, VSS  2/10: started valsartan, medicine recs    STATUS POST:  1/30 Exlap resection of EC fistula, SBR x2 with patino handsewn anastomsis     SUBJECTIVE: Patient seen and examined bedside by chief resident and surgical team. Patient states she is well and wants to go home. Patient denies n/v/d/cp/sob.    heparin  flush 100 Units/mL Injectable 100 Unit(s) IV Push every other day  heparin  Injectable 5000 Unit(s) SubCutaneous every 8 hours  labetalol Injectable 10 milliGRAM(s) IV Push every 2 hours PRN  losartan 50 milliGRAM(s) Oral daily      Vital Signs Last 24 Hrs  T(C): 36.8 (11 Feb 2018 05:00), Max: 37.6 (10 Feb 2018 17:30)  T(F): 98.3 (11 Feb 2018 05:00), Max: 99.6 (10 Feb 2018 17:30)  HR: 92 (11 Feb 2018 05:20) (86 - 102)  BP: 148/68 (11 Feb 2018 05:20) (143/80 - 171/88)  BP(mean): 98 (11 Feb 2018 05:20) (98 - 122)  RR: 22 (11 Feb 2018 05:20) (19 - 22)  SpO2: 91% (11 Feb 2018 05:20) (91% - 100%)  I&O's Detail    10 Feb 2018 07:01  -  11 Feb 2018 07:00  --------------------------------------------------------  IN:    Oral Fluid: 1095 mL    Solution: 100 mL    Solution: 200 mL    TPN (Total Parenteral Nutrition): 1386 mL  Total IN: 2781 mL    OUT:    Bulb: 55 mL    Drain: 675 mL  Total OUT: 730 mL    Total NET: 2051 mL      General: NAD, resting comfortably in bed  C/V: NSR  Pulm: Nonlabored breathing, no respiratory distress  Abd: soft, NT/ND. Incision area are clean, dry and intact.  Extrem: WWP, no edema, SCDs in place    LABS:                        6.7    8.1   )-----------( 302      ( 11 Feb 2018 05:51 )             22.3     02-11    138  |  104  |  16  ----------------------------<  120<H>  4.4   |  23  |  0.70    Ca    8.7      11 Feb 2018 05:51  Phos  2.9     02-11  Mg     2.0     02-11    TPro  6.2  /  Alb  2.3<L>  /  TBili  1.6<H>  /  DBili  x   /  AST  42<H>  /  ALT  38  /  AlkPhos  559<H>  02-11          RADIOLOGY & ADDITIONAL STUDIES:

## 2018-02-12 DIAGNOSIS — D62 ACUTE POSTHEMORRHAGIC ANEMIA: ICD-10-CM

## 2018-02-12 LAB
ALBUMIN SERPL ELPH-MCNC: 2.4 G/DL — LOW (ref 3.3–5)
ALP SERPL-CCNC: 395 U/L — HIGH (ref 40–120)
ALT FLD-CCNC: 33 U/L — SIGNIFICANT CHANGE UP (ref 10–45)
ANION GAP SERPL CALC-SCNC: 13 MMOL/L — SIGNIFICANT CHANGE UP (ref 5–17)
AST SERPL-CCNC: 33 U/L — SIGNIFICANT CHANGE UP (ref 10–40)
BILIRUB SERPL-MCNC: 2 MG/DL — HIGH (ref 0.2–1.2)
BUN SERPL-MCNC: 16 MG/DL — SIGNIFICANT CHANGE UP (ref 7–23)
CALCIUM SERPL-MCNC: 8.6 MG/DL — SIGNIFICANT CHANGE UP (ref 8.4–10.5)
CHLORIDE SERPL-SCNC: 101 MMOL/L — SIGNIFICANT CHANGE UP (ref 96–108)
CK MB CFR SERPL CALC: <1 NG/ML — SIGNIFICANT CHANGE UP (ref 0–6.7)
CK SERPL-CCNC: 15 U/L — LOW (ref 25–170)
CO2 SERPL-SCNC: 22 MMOL/L — SIGNIFICANT CHANGE UP (ref 22–31)
CREAT SERPL-MCNC: 0.77 MG/DL — SIGNIFICANT CHANGE UP (ref 0.5–1.3)
GLUCOSE BLDC GLUCOMTR-MCNC: 83 MG/DL — SIGNIFICANT CHANGE UP (ref 70–99)
GLUCOSE BLDC GLUCOMTR-MCNC: 89 MG/DL — SIGNIFICANT CHANGE UP (ref 70–99)
GLUCOSE BLDC GLUCOMTR-MCNC: 91 MG/DL — SIGNIFICANT CHANGE UP (ref 70–99)
GLUCOSE BLDC GLUCOMTR-MCNC: 99 MG/DL — SIGNIFICANT CHANGE UP (ref 70–99)
GLUCOSE SERPL-MCNC: 95 MG/DL — SIGNIFICANT CHANGE UP (ref 70–99)
HCT VFR BLD CALC: 28.2 % — LOW (ref 34.5–45)
HGB BLD-MCNC: 9.1 G/DL — LOW (ref 11.5–15.5)
MAGNESIUM SERPL-MCNC: 1.8 MG/DL — SIGNIFICANT CHANGE UP (ref 1.6–2.6)
MCHC RBC-ENTMCNC: 28.8 PG — SIGNIFICANT CHANGE UP (ref 27–34)
MCHC RBC-ENTMCNC: 32.3 G/DL — SIGNIFICANT CHANGE UP (ref 32–36)
MCV RBC AUTO: 89.2 FL — SIGNIFICANT CHANGE UP (ref 80–100)
PHOSPHATE SERPL-MCNC: 3.7 MG/DL — SIGNIFICANT CHANGE UP (ref 2.5–4.5)
PLATELET # BLD AUTO: 322 K/UL — SIGNIFICANT CHANGE UP (ref 150–400)
POTASSIUM SERPL-MCNC: 5.1 MMOL/L — SIGNIFICANT CHANGE UP (ref 3.5–5.3)
POTASSIUM SERPL-SCNC: 5.1 MMOL/L — SIGNIFICANT CHANGE UP (ref 3.5–5.3)
PROT SERPL-MCNC: 6.1 G/DL — SIGNIFICANT CHANGE UP (ref 6–8.3)
RBC # BLD: 3.16 M/UL — LOW (ref 3.8–5.2)
RBC # FLD: 17.6 % — HIGH (ref 10.3–16.9)
SODIUM SERPL-SCNC: 136 MMOL/L — SIGNIFICANT CHANGE UP (ref 135–145)
TROPONIN T SERPL-MCNC: 0.02 NG/ML — HIGH (ref 0–0.01)
TROPONIN T SERPL-MCNC: 0.02 NG/ML — HIGH (ref 0–0.01)
WBC # BLD: 15.5 K/UL — HIGH (ref 3.8–10.5)
WBC # FLD AUTO: 15.5 K/UL — HIGH (ref 3.8–10.5)

## 2018-02-12 PROCEDURE — 99255 IP/OBS CONSLTJ NEW/EST HI 80: CPT

## 2018-02-12 PROCEDURE — 99233 SBSQ HOSP IP/OBS HIGH 50: CPT | Mod: GC

## 2018-02-12 PROCEDURE — 99232 SBSQ HOSP IP/OBS MODERATE 35: CPT

## 2018-02-12 RX ORDER — METOPROLOL TARTRATE 50 MG
5 TABLET ORAL ONCE
Qty: 0 | Refills: 0 | Status: COMPLETED | OUTPATIENT
Start: 2018-02-12 | End: 2018-02-12

## 2018-02-12 RX ORDER — METOPROLOL TARTRATE 50 MG
12.5 TABLET ORAL
Qty: 0 | Refills: 0 | Status: DISCONTINUED | OUTPATIENT
Start: 2018-02-12 | End: 2018-02-23

## 2018-02-12 RX ORDER — SODIUM CHLORIDE 9 MG/ML
1000 INJECTION, SOLUTION INTRAVENOUS
Qty: 0 | Refills: 0 | Status: DISCONTINUED | OUTPATIENT
Start: 2018-02-12 | End: 2018-02-14

## 2018-02-12 RX ORDER — METOPROLOL TARTRATE 50 MG
2.5 TABLET ORAL EVERY 6 HOURS
Qty: 0 | Refills: 0 | Status: DISCONTINUED | OUTPATIENT
Start: 2018-02-12 | End: 2018-02-23

## 2018-02-12 RX ORDER — ELECTROLYTE SOLUTION,INJ
1 VIAL (ML) INTRAVENOUS
Qty: 0 | Refills: 0 | Status: DISCONTINUED | OUTPATIENT
Start: 2018-02-12 | End: 2018-02-13

## 2018-02-12 RX ORDER — I.V. FAT EMULSION 20 G/100ML
50 EMULSION INTRAVENOUS ONCE
Qty: 0 | Refills: 0 | Status: COMPLETED | OUTPATIENT
Start: 2018-02-12 | End: 2018-02-12

## 2018-02-12 RX ADMIN — Medication 1 EACH: at 17:25

## 2018-02-12 RX ADMIN — ESCITALOPRAM OXALATE 20 MILLIGRAM(S): 10 TABLET, FILM COATED ORAL at 12:10

## 2018-02-12 RX ADMIN — Medication 5 MILLIGRAM(S): at 21:53

## 2018-02-12 RX ADMIN — PANTOPRAZOLE SODIUM 40 MILLIGRAM(S): 20 TABLET, DELAYED RELEASE ORAL at 07:03

## 2018-02-12 RX ADMIN — Medication 5 MILLIGRAM(S): at 03:45

## 2018-02-12 RX ADMIN — HEPARIN SODIUM 5000 UNIT(S): 5000 INJECTION INTRAVENOUS; SUBCUTANEOUS at 07:04

## 2018-02-12 RX ADMIN — PANTOPRAZOLE SODIUM 40 MILLIGRAM(S): 20 TABLET, DELAYED RELEASE ORAL at 18:16

## 2018-02-12 RX ADMIN — LOSARTAN POTASSIUM 50 MILLIGRAM(S): 100 TABLET, FILM COATED ORAL at 07:14

## 2018-02-12 RX ADMIN — Medication 10 MILLIGRAM(S): at 12:10

## 2018-02-12 RX ADMIN — Medication 12.5 MILLIGRAM(S): at 18:32

## 2018-02-12 RX ADMIN — HEPARIN SODIUM 5000 UNIT(S): 5000 INJECTION INTRAVENOUS; SUBCUTANEOUS at 21:53

## 2018-02-12 RX ADMIN — HEPARIN SODIUM 5000 UNIT(S): 5000 INJECTION INTRAVENOUS; SUBCUTANEOUS at 13:51

## 2018-02-12 RX ADMIN — I.V. FAT EMULSION 20.83 GRAM(S): 20 EMULSION INTRAVENOUS at 21:53

## 2018-02-12 NOTE — PROGRESS NOTE ADULT - PROBLEM SELECTOR PLAN 2
Vtach resolved, but SVT to 180s overnight in setting of low hemoglobin. Resolved with transfusion and lopressor 5mg IV push.  -Cardiology following, will defer management.  -Replete electrolytes for goal K >= 4 and Mg >=2.  -Monitor on telemetry.  -Repeat Hgb acceptable. Vtach resolved, but SVT to 180s overnight in setting of low hemoglobin. Resolved with transfusion and lopressor 5mg IV push.  -Cardiology following, will defer management.  -Replete electrolytes for goal K >= 4 and Mg >=2.  -Monitor on telemetry.  -Repeat Hgb acceptable.    #Troponinemia  stable. No chest pain. Likely 2/2 demand in the setting of SVT.  -Defer management to Cardiology Vtach resolved, but SVT to 180s overnight in setting of low hemoglobin. Resolved with transfusion and lopressor 5mg IV push.  -Cardiology following follow up recs  -Replete electrolytes for goal K >= 4 and Mg >=2.  -Monitor on telemetry.  -Repeat Hgb acceptable.    #Troponinemia  stable. No chest pain. Likely 2/2 demand in the setting of SVT.  -Defer management to Cardiology

## 2018-02-12 NOTE — PROVIDER CONTACT NOTE (OTHER) - NAME OF MD/NP/PA/DO NOTIFIED:
Andre STREETER
Crystal RAZO
DANNI Harris
DANNI Mondragon
Dawna Sierra
Dr. Cedrick Maria
Fidelia RAZO
Julien Mora
MD Dharmesh Ramírez
MD LOCKE-[facility] TEAM
PA Binh
PA Binh
Team 2 on rounds
team 2
DANNI Mondragon
DANNI Sierra

## 2018-02-12 NOTE — PROGRESS NOTE ADULT - ASSESSMENT
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 Seen by GI, with EGD revealing EJ junction with healed mucosa. Subsequently found to have hyperbilirubinemia and evidence of obstruction on CT abdomen. Transferred to SICU and subsequently underwent percutaneous cholecystostomy by IR. Improved and was stepped down to regional, but had episode of SVT to 180s in the setting of surgical drain falling out and low hemoglobin and was subsequently stepped back up to telemetry. Medicine continues to follow for optimization of hypertension. 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 Seen by GI, with EGD revealing EJ junction with healed mucosa. Subsequently found to have hyperbilirubinemia and evidence of obstruction on CT abdomen. Transferred to SICU and subsequently underwent percutaneous cholecystostomy by IR. Improved and was stepped down to regional, but had episode of SVT to 180s in the setting of surgical drain falling out and low hemoglobin and was subsequently stepped back up to telemetry. Medicine following for optimization of hypertension.

## 2018-02-12 NOTE — PROGRESS NOTE ADULT - SUBJECTIVE AND OBJECTIVE BOX
O/N: bleeding around CHECO suture securing CHECO no longer in place. CHECO removed, suture placed to control bleeding, bolused 1 liter, transfer to [facility], H/H 5.6 down from 6.7, 2 units PRBC given, restarted on IVf, went into SVT , lopressor 5mg given. trop 0.02 will repeat   2/11: stepped down, added Lexapro to med regiment, 2 out of 3 lumens on IJ are not working f/u tomorrow to fix    Surgeries:  7/24: SBR resection, fistula resection, revision of esophagogegunostomy drain through esophageal hiatus in R mediastinum  7/29: Ex-lap, SB anastamosis in BP limb breakdown with succus throughout abdomen  8/1: abd washout, drainage of abscess, biologic mesh placement, closure of abdominal wall, vac and retention suture  8/7: abd washout, mesh removal, frozen abd noted, LLQ CHECO replaced with benson drain  8/10: leak noted from previous anastamosis site on L side, mid abdomen malecot drain placed in enterotomy, JPs x 2 placed, necrotic fascia debrided, vicryl mesh sutured to fascia circumferentially, xeroform over mesh then vac dressing placed	  1/30: Ex-lap, resection of EC fistula, SBRx2 with patino handsewn anastomosis, JPx2 (SQ) placement, primary abdominal closure with b/l external oblique release and prevena placement.  3L Crystalloid, and had 550cc UOP and 500cc EBL.        SUBJECTIVE: Denies any complaints, continue to pass flatus and BM, Denies N/V    MEDICATIONS  (STANDING):  bisacodyl Suppository 10 milliGRAM(s) Rectal daily  diazepam    Tablet 5 milliGRAM(s) Oral at bedtime  escitalopram 20 milliGRAM(s) Oral daily  heparin  flush 100 Units/mL Injectable 100 Unit(s) IV Push every other day  heparin  Injectable 5000 Unit(s) SubCutaneous every 8 hours  insulin lispro (HumaLOG) corrective regimen sliding scale   SubCutaneous Before meals and at bedtime  lactated ringers. 1000 milliLiter(s) (115 mL/Hr) IV Continuous <Continuous>  losartan 50 milliGRAM(s) Oral daily  pantoprazole  Injectable 40 milliGRAM(s) IV Push two times a day  Parenteral Nutrition - Adult 1 Each (63 mL/Hr) TPN Continuous <Continuous>  sodium chloride 3%  Inhalation 4 milliLiter(s) Inhalation once    MEDICATIONS  (PRN):  labetalol Injectable 10 milliGRAM(s) IV Push every 2 hours PRN Systolic blood pressure >160      Vital Signs Last 24 Hrs  T(C): 36.6 (12 Feb 2018 05:25), Max: 37.6 (11 Feb 2018 20:25)  T(F): 97.8 (12 Feb 2018 05:25), Max: 99.6 (11 Feb 2018 20:25)  HR: 84 (12 Feb 2018 04:01) (84 - 186)  BP: 117/64 (12 Feb 2018 04:01) (89/53 - 174/117)  BP(mean): 84 (12 Feb 2018 04:01) (72 - 107)  RR: 18 (12 Feb 2018 04:01) (16 - 24)  SpO2: 92% (12 Feb 2018 04:01) (92% - 98%)    PHYSICAL EXAM:      Constitutional: A&Ox3    Respiratory: non labored breathing, no respiratory distress    Cardiovascular: NSR, RRR    Gastrointestinal:  Soft, ND,NT                 Incision: midline incision CDI, perc steven intact and functioning    Genitourinary: Voiding    Extremities: (-) edema                  I&O's Detail    11 Feb 2018 07:01  -  12 Feb 2018 07:00  --------------------------------------------------------  IN:    lactated ringers.: 440 mL    Packed Red Blood Cells: 700 mL    TPN (Total Parenteral Nutrition): 630 mL  Total IN: 1770 mL    OUT:    Bulb: 17 mL    Drain: 460 mL  Total OUT: 477 mL    Total NET: 1293 mL          LABS:                        9.1    15.5  )-----------( 322      ( 12 Feb 2018 04:06 )             28.2     02-12    136  |  101  |  16  ----------------------------<  95  5.1   |  22  |  0.77    Ca    8.6      12 Feb 2018 04:06  Phos  3.7     02-12  Mg     1.8     02-12    TPro  6.1  /  Alb  2.4<L>  /  TBili  2.0<H>  /  DBili  x   /  AST  33  /  ALT  33  /  AlkPhos  395<H>  02-12          RADIOLOGY & ADDITIONAL STUDIES:

## 2018-02-12 NOTE — PROVIDER CONTACT NOTE (CRITICAL VALUE NOTIFICATION) - SITUATION
Pt's casey fell off on left lower quadrant. MD Powell and DANNI Donovan sutured pt's left lower abdomen quadrant at bedside.

## 2018-02-12 NOTE — PROVIDER CONTACT NOTE (CHANGE IN STATUS NOTIFICATION) - ASSESSMENT
AAOX4. Denied SOB, chest pain, calf tenderness. Denied dizziness, headache, palpitations. TPN in progress.
patient was biting ET tube for a while. biteblock applied. spo2 91-95% on FIO2 60%. MD & PA at bedside.
santos jenkins and md stewart assest the pt ,ecg and blood work was done
AAOX4. Denied fever, chills. Denied SOB, chest pain, calf tenderness. TPN in progress. Wound vac dressing leaking and MD aware.  CHECO to abdomen in place. Ostomy pouch intact.
Pt AAOx4. TPN in progress. Denied SOB, chest pain, calf tenderness. Denied pain or discomfort. Repeat HR at 00:10 113, O2sat 94%RA. Abdomen clean and dry.

## 2018-02-12 NOTE — PROVIDER CONTACT NOTE (CRITICAL VALUE NOTIFICATION) - NAME OF MD/NP/PA/DO NOTIFIED:
Leyla Georges, PA
DANNI Donovan
Dr. Komal Stephen
Komal Stephen MD
MD Clemencia Luz
PA: Victorina Harris

## 2018-02-12 NOTE — PROGRESS NOTE ADULT - SUBJECTIVE AND OBJECTIVE BOX
SUBJECTIVE:    CHIEF COMPLAINT:  Patient is a 57y old  Female who presents with a chief complaint of enterocutaneous fistula (24 Jul 2017 14:15)    INTERVAL HX:  Patient was seen and examined at bedside. Feels well. Elevated HR overnight in the setting of having some abdominal bleeding when surgical drain fell out. Now closed. Low hemoglobin and transfused 2 units pRBCs.    REVIEW OF SYSTEMS:  10pt ROS negative except as per HPI    OBJECTIVE:    Vital Signs Last 24 Hrs  T(C): 37.5 (12 Feb 2018 09:00), Max: 37.6 (11 Feb 2018 20:25)  T(F): 99.5 (12 Feb 2018 09:00), Max: 99.6 (11 Feb 2018 20:25)  HR: 88 (12 Feb 2018 08:32) (84 - 186)  BP: 127/72 (12 Feb 2018 08:32) (89/53 - 174/117)  BP(mean): 92 (12 Feb 2018 08:32) (72 - 95)  RR: 18 (12 Feb 2018 08:32) (16 - 24)  SpO2: 93% (12 Feb 2018 08:32) (92% - 98%)    Height (cm): 160 (08-01 @ 10:08)  Weight (kg): 70.477 (01-30 @ 07:02)  BMI (kg/m2): 27.5 (01-30 @ 07:02)    CAPILLARY BLOOD GLUCOSE  POCT Blood Glucose.: 91 mg/dL (12 Feb 2018 05:46)  POCT Blood Glucose.: 108 mg/dL (11 Feb 2018 21:21)  POCT Blood Glucose.: 111 mg/dL (11 Feb 2018 17:32)  POCT Blood Glucose.: 100 mg/dL (11 Feb 2018 11:56)      PHYSICAL EXAM:  Gen:       well-appearing, no acute distress, obese  Skin:       warm, dry, no rash  HEENT:  moist mucous membranes, oropharynx clear, nares clear, no septal deviation  Neck:     supple, no jugular venous distention, no thyromegaly or goiter  Cardiac: regular rate and rhythm, S1/S2 present, no murmur/gallop/rub  Pulm:     clear to auscultation bilaterally, no wheezes/crackles  Abd:       Soft, non-tender, non-distended. Normoactive bowel sounds. Cholecystostomy tube in place, draining bilious fluid. Surgical drain as been removed and abdominal incision is now entirely sutured closed. C/d/i.  Vasc:      warm and well perfused, 2+ dorsalis pedis and radial pulses, trace pedal edema  Ext:         normal range of motion, atraumatic  Neuro:   awake, alert and orient x3, Cranial Nerves II-XII grossly intact, no focal deficits    MEDICATIONS  (STANDING):  bisacodyl Suppository 10 milliGRAM(s) Rectal daily  diazepam    Tablet 5 milliGRAM(s) Oral at bedtime  escitalopram 20 milliGRAM(s) Oral daily  fat emulsion (Plant Based) 20% IVPB 50 Gram(s) IV Intermittent once  heparin  flush 100 Units/mL Injectable 100 Unit(s) IV Push every other day  heparin  Injectable 5000 Unit(s) SubCutaneous every 8 hours  insulin lispro (HumaLOG) corrective regimen sliding scale   SubCutaneous Before meals and at bedtime  lactated ringers. 1000 milliLiter(s) (115 mL/Hr) IV Continuous <Continuous>  losartan 50 milliGRAM(s) Oral daily  pantoprazole  Injectable 40 milliGRAM(s) IV Push two times a day  Parenteral Nutrition - Adult 1 Each (63 mL/Hr) TPN Continuous <Continuous>  Parenteral Nutrition - Adult 1 Each (63 mL/Hr) TPN Continuous <Continuous>  sodium chloride 3%  Inhalation 4 milliLiter(s) Inhalation once    MEDICATIONS  (PRN):  labetalol Injectable 10 milliGRAM(s) IV Push every 2 hours PRN Systolic blood pressure >160      Allergies    sulfa drugs (Unknown)  sulfamethoxazole (Other)      LABS:                        9.1    15.5  )-----------( 322      ( 12 Feb 2018 04:06 )             28.2    Mean Cell Volume: 89.2 fL (02-12 @ 04:06)    02-12    136  |  101  |  16  ----------------------------<  95  5.1   |  22  |  0.77    Ca    8.6      12 Feb 2018 04:06  Phos  3.7     02-12  Mg     1.8     02-12    TPro  6.1  /  Alb  2.4<L>  /  TBili  2.0<H>  /  DBili  x   /  AST  33  /  ALT  33  /  AlkPhos  395<H>  02-12          MICROBIOLOGY: REVIEWED      RADIOLOGY: REVIEWED SUBJECTIVE:    CHIEF COMPLAINT:  Patient is a 57y old  Female who presents with a chief complaint of enterocutaneous fistula (24 Jul 2017 14:15)    INTERVAL HX:  Patient was seen and examined at bedside. Feels well. Elevated HR overnight in the setting of having some abdominal bleeding when surgical drain fell out. Now closed. Low hemoglobin and transfused 2 units pRBCs.    REVIEW OF SYSTEMS:  10pt ROS negative except as per HPI    OBJECTIVE:    Vital Signs Last 24 Hrs  T(C): 37.5 (12 Feb 2018 09:00), Max: 37.6 (11 Feb 2018 20:25)  T(F): 99.5 (12 Feb 2018 09:00), Max: 99.6 (11 Feb 2018 20:25)  HR: 88 (12 Feb 2018 08:32) (84 - 186)  BP: 127/72 (12 Feb 2018 08:32) (89/53 - 174/117)  BP(mean): 92 (12 Feb 2018 08:32) (72 - 95)  RR: 18 (12 Feb 2018 08:32) (16 - 24)  SpO2: 93% (12 Feb 2018 08:32) (92% - 98%)    Height (cm): 160 (08-01 @ 10:08)  Weight (kg): 70.477 (01-30 @ 07:02)  BMI (kg/m2): 27.5 (01-30 @ 07:02)    CAPILLARY BLOOD GLUCOSE  POCT Blood Glucose.: 91 mg/dL (12 Feb 2018 05:46)  POCT Blood Glucose.: 108 mg/dL (11 Feb 2018 21:21)  POCT Blood Glucose.: 111 mg/dL (11 Feb 2018 17:32)  POCT Blood Glucose.: 100 mg/dL (11 Feb 2018 11:56)      PHYSICAL EXAM:  Gen:       well-appearing, no acute distress, obese  Skin:       warm, dry, no rash  HEENT:  moist mucous membranes, oropharynx clear, nares clear, no septal deviation  Neck:     supple, no jugular venous distention, no thyromegaly or goiter  Cardiac: regular rate and rhythm, S1/S2 present, no murmur/gallop/rub  Pulm:     clear to auscultation bilaterally, no wheezes/crackles  Abd:       Soft, non-tender, non-distended. Normoactive bowel sounds. Cholecystostomy tube in place, draining bilious fluid. Surgical drain as been removed and abdominal incision is now entirely sutured closed. C/d/i.  Vasc:      warm and well perfused, 2+ dorsalis pedis and radial pulses, trace pedal edema  Ext:         normal range of motion, atraumatic  Neuro:   awake, alert and orient x3, Cranial Nerves II-XII grossly intact, no focal deficits    MEDICATIONS  (STANDING):  bisacodyl Suppository 10 milliGRAM(s) Rectal daily  diazepam    Tablet 5 milliGRAM(s) Oral at bedtime  escitalopram 20 milliGRAM(s) Oral daily  fat emulsion (Plant Based) 20% IVPB 50 Gram(s) IV Intermittent once  heparin  flush 100 Units/mL Injectable 100 Unit(s) IV Push every other day  heparin  Injectable 5000 Unit(s) SubCutaneous every 8 hours  insulin lispro (HumaLOG) corrective regimen sliding scale   SubCutaneous Before meals and at bedtime  lactated ringers. 1000 milliLiter(s) (115 mL/Hr) IV Continuous <Continuous>  losartan 50 milliGRAM(s) Oral daily  pantoprazole  Injectable 40 milliGRAM(s) IV Push two times a day  Parenteral Nutrition - Adult 1 Each (63 mL/Hr) TPN Continuous <Continuous>  Parenteral Nutrition - Adult 1 Each (63 mL/Hr) TPN Continuous <Continuous>  sodium chloride 3%  Inhalation 4 milliLiter(s) Inhalation once    MEDICATIONS  (PRN):  labetalol Injectable 10 milliGRAM(s) IV Push every 2 hours PRN Systolic blood pressure >160      Allergies    sulfa drugs (Unknown)  sulfamethoxazole (Other)      LABS:                        9.1    15.5  )-----------( 322      ( 12 Feb 2018 04:06 )             28.2    Mean Cell Volume: 89.2 fL (02-12 @ 04:06)    02-12    136  |  101  |  16  ----------------------------<  95  5.1   |  22  |  0.77    Ca    8.6      12 Feb 2018 04:06  Phos  3.7     02-12  Mg     1.8     02-12    TPro  6.1  /  Alb  2.4<L>  /  TBili  2.0<H>  /  DBili  x   /  AST  33  /  ALT  33  /  AlkPhos  395<H>  02-12    CARDIAC MARKERS ( 12 Feb 2018 11:31 )  x     / 0.02 ng/mL / x     / x     / x      CARDIAC MARKERS ( 12 Feb 2018 04:06 )  x     / 0.02 ng/mL / 15 U/L / x     / <1.0 ng/mL      MICROBIOLOGY: REVIEWED      RADIOLOGY: REVIEWED

## 2018-02-12 NOTE — PROGRESS NOTE ADULT - SUBJECTIVE AND OBJECTIVE BOX
On interval followup for the left int jugular catheter, the staff reported two dysfunctional ports earlier today. The site is clean, dry and non-inflamed and non-tender. Presently the intermediate and the   proximal blue and white ports are infusing and were not disturbed. The distal large red port flushes easily but does not aspirate; it is probably extraneous to consider tpa- Cathflo since it may not work.  With the other two ports possibly aspirating, it should suffice. The temp, notes and cultures are followed.

## 2018-02-12 NOTE — CONSULT NOTE ADULT - PROVIDER SPECIALTY LIST ADULT
ENT
Gastroenterology
Infectious Disease
Infectious Disease
Internal Medicine
Intervent Radiology
Intervent Radiology
Nephrology
Plastic Surgery
Rehab Medicine
SICU
Surgery
Thoracic Surgery
Urology
Pain Medicine
Electrophysiology

## 2018-02-12 NOTE — PROGRESS NOTE ADULT - PROBLEM SELECTOR PLAN 1
BP acceptable.  Can continue labetalol IV pushes prn.  -C/w losartan 50mg qDaily.  -Goal BP less than or equal to 140/90.  -Monitor CrCl on ARB. BP acceptable.  Can discontinue labetalol pushes as patient has not received for days and BP is well controlled.  -C/w losartan 50mg qDaily.  -Goal BP less than or equal to 140/90.  -Monitor CrCl on ARB.

## 2018-02-12 NOTE — CONSULT NOTE ADULT - CONSULT REQUESTED BY NAME
Caitlyn
Dr Brady
Dr. Brady
General surgery
House staff
SICU
Surgery
Primary team
Caitlyn
Dr Johnson

## 2018-02-12 NOTE — PROGRESS NOTE ADULT - ATTENDING COMMENTS
Patient was seen and examined by me at bedside. I agree with resident's note, subjective, objective physical exam, assessment and plan with following modifications/additions.    Blood pressure now well controlled on losartan.  Follow EP recs regarding VTach episode.  Will sign off for now.  Thank you for the consult. Patient was seen and examined by me at bedside. I agree with resident's note, subjective, objective physical exam, assessment and plan with following modifications/additions.    Blood pressure now well controlled on losartan.  Follow EP recs regarding SVT episode.  Will sign off for now.  Thank you for the consult.

## 2018-02-12 NOTE — PROVIDER CONTACT NOTE (CRITICAL VALUE NOTIFICATION) - RECOMMENDATIONS
No intervention
Hold Heparin for an hour and restart
MD Clemencia Luz aware and says she has been aware for a  while
To transfer patient to higher level of care and blood transfusion

## 2018-02-12 NOTE — PROGRESS NOTE ADULT - PROBLEM SELECTOR PLAN 3
Enterocutaneous fistula now s/p repair and closure. Pain is well controlled. Surgical sites are c/d/i. Drain in place with serosanguinous drainage.  -Management as per primary team.  -Pain control: adequate on non-opioid regimen.  -DVT Ppx: SQH  -Encourage incentive spirometry. 2/2 blood loss from multiple operative procedures and serial phlebotomy. Remains stable.  -Low overnight likely 2/2 abdominal bleeding when drain came out. S/p transfusion of 2 units pRBCs. Now stable.  -Goal Hgb > 7 to minimize the risk of cardiovascular and neurologic morbidity.  -Maintain active type and screen. Minimize blood draws.

## 2018-02-12 NOTE — PROVIDER CONTACT NOTE (OTHER) - DATE AND TIME:
25-Jul-2017 16:59
02-Sep-2017 17:00
03-Jan-2018 03:30
08-Feb-2018 11:25
08-Feb-2018 12:52
09-Feb-2018 06:45
09-Oct-2017 22:10
11-Oct-2017 17:30
11-Oct-2017 18:35
12-Feb-2018 03:50
19-Sep-2017 11:31
23-Aug-2017 23:00
25-Jul-2017 21:20
27-Sep-2017 21:00
27-Sep-2017 22:00
30-Jan-2018 17:45
27-Sep-2017 21:45
13-Nov-2017 14:00

## 2018-02-12 NOTE — PROVIDER CONTACT NOTE (OTHER) - REASON
BP, Temp, HR elevated
Hgb 7.0
High BP and lethargic
LAB RESULTS
Left IJ found disconnected
Patient had 3x 2.8 long pause on EKG
Pt BP in the 180s
Pt have 8 beats of V-tach
Reinforced wound vac dressing peeled off
Vanco trough level
Wound vac dressing peeled off
fistula drainage bag leaking with stool
pt with  svts
ro [facility] level
temp 101.4 MAP <65
New wound vac dressing peeled off
decrease b/p & no urine output
pudding coming out of EC fistula

## 2018-02-12 NOTE — PROVIDER CONTACT NOTE (CHANGE IN STATUS NOTIFICATION) - DATE AND TIME:
01-Aug-2017 19:00
01-Aug-2017 19:45
01-Oct-2017 00:20
01-Oct-2017 21:10
12-Feb-2018 03:45
01-Oct-2017 06:30

## 2018-02-12 NOTE — CONSULT NOTE ADULT - SUBJECTIVE AND OBJECTIVE BOX
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 Seen by GI, with EGD revealing EJ junction with healed mucosa. Subsequently found to have hyperbilirubinemia and evidence of obstruction on CT abdomen. Transferred to SICU and subsequently underwent percutaneous cholecystostomy by IR. Improved and was stepped down to regional, but had episode of SVT to 180s in the setting of surgical drain falling out and low hemoglobin and was subsequently stepped back up to telemetry.  The SVT resolved with IV metoprolol and blood transfusions. Pt denies any symptoms during the episode.      PAST MEDICAL & SURGICAL HISTORY:  Reflux  Obesity  DVT (deep venous thrombosis): 2013 neck  Diverticulitis  Hypertension  Elective surgery: abodominal wall surgery  dec 2016  Elective surgery: NOV 2016 gastric bypass revision  Gastric bypass status for obesity: gastric sleeve, 6/2012  S/P breast biopsy: 2011, left  S/P colon resection: 2011  S/P knee surgery: repair 2009, 2011  right; left  Umbilical hernia: 2000  S/P appendectomy: 1975  S/P tonsillectomy: 1967    Allergic/Immunologic:	    sulfa drugs (Unknown)  sulfamethoxazole (Other)      FAMILY HISTORY:  No pertinent family history in first degree relatives        Vital Signs Last 24 Hrs  T(C): 37.5 (12 Feb 2018 09:00), Max: 37.6 (11 Feb 2018 20:25)  T(F): 99.5 (12 Feb 2018 09:00), Max: 99.6 (11 Feb 2018 20:25)  HR: 84 (12 Feb 2018 12:40) (84 - 186)  BP: 116/58 (12 Feb 2018 12:40) (89/53 - 164/97)  BP(mean): 81 (12 Feb 2018 12:40) (72 - 95)  RR: 18 (12 Feb 2018 12:40) (16 - 24)  SpO2: 93% (12 Feb 2018 12:40) (92% - 98%)   I&O's Detail    11 Feb 2018 07:01  -  12 Feb 2018 07:00  --------------------------------------------------------  IN:    lactated ringers.: 440 mL    Packed Red Blood Cells: 700 mL    TPN (Total Parenteral Nutrition): 630 mL  Total IN: 1770 mL    OUT:    Bulb: 17 mL    Drain: 460 mL  Total OUT: 477 mL    Total NET: 1293 mL      12 Feb 2018 07:01  -  12 Feb 2018 13:30  --------------------------------------------------------  IN:    lactated ringers.: 575 mL  Total IN: 575 mL    OUT:  Total OUT: 0 mL    Total NET: 575 mL        Daily     Daily     Physical Exam:   GEN: NAD, AAOx3  HEENT: MMM, no icterus  CV: S1 S2 RRR, no MRG  Lung: CTAB  Ext: no c/c/e  Neuro: no focal neuro deficit    MEDICATIONS  (STANDING):  bisacodyl Suppository 10 milliGRAM(s) Rectal daily  diazepam    Tablet 5 milliGRAM(s) Oral at bedtime  escitalopram 20 milliGRAM(s) Oral daily  fat emulsion (Plant Based) 20% IVPB 50 Gram(s) IV Intermittent once  heparin  flush 100 Units/mL Injectable 100 Unit(s) IV Push every other day  heparin  Injectable 5000 Unit(s) SubCutaneous every 8 hours  insulin lispro (HumaLOG) corrective regimen sliding scale   SubCutaneous Before meals and at bedtime  lactated ringers. 1000 milliLiter(s) (115 mL/Hr) IV Continuous <Continuous>  losartan 50 milliGRAM(s) Oral daily  metoprolol     tartrate 12.5 milliGRAM(s) Oral two times a day  pantoprazole  Injectable 40 milliGRAM(s) IV Push two times a day  Parenteral Nutrition - Adult 1 Each (63 mL/Hr) TPN Continuous <Continuous>  Parenteral Nutrition - Adult 1 Each (63 mL/Hr) TPN Continuous <Continuous>  sodium chloride 3%  Inhalation 4 milliLiter(s) Inhalation once      TELEMETRY: no events    ECG: SVT, 186bpm    Echo: LVEF 60%, PASP 32mmHg    LABS:                        9.1    15.5  )-----------( 322      ( 12 Feb 2018 04:06 )             28.2     02-12    136  |  101  |  16  ----------------------------<  95  5.1   |  22  |  0.77    Ca    8.6      12 Feb 2018 04:06  Phos  3.7     02-12  Mg     1.8     02-12    TPro  6.1  /  Alb  2.4<L>  /  TBili  2.0<H>  /  DBili  x   /  AST  33  /  ALT  33  /  AlkPhos  395<H>  02-12    CARDIAC MARKERS ( 12 Feb 2018 11:31 )  x     / 0.02 ng/mL / x     / x     / x      CARDIAC MARKERS ( 12 Feb 2018 04:06 )  x     / 0.02 ng/mL / 15 U/L / x     / <1.0 ng/mL            RADIOLOGY & ADDITIONAL STUDIES:

## 2018-02-12 NOTE — PROGRESS NOTE ADULT - PROBLEM SELECTOR PLAN 7
Albumin remains low  -receiving TPN  -Now on clear liquid diet.  -Given bowel surgeries and malabsorption syndrome, time npo and now on TPN, would recheck Vitamins A,D,E,K,B12,C, folic acid, copper, and iron studies.    Preliminary recommendations pending Attending review. Albumin remains low  -receiving TPN  -Now on clear liquid diet.  -Given bowel surgeries and malabsorption syndrome, time npo and now on TPN, would recheck Vitamins A,D,E,K,B12,C, folic acid, copper, and iron studies.    Case discussed with Attending Dr. Church and Primary team. Albumin remains low  -receiving TPN  -Now on clear liquid diet.  -Given bowel surgeries and malabsorption syndrome, time npo and now on TPN, would recheck Vitamins A,D,E,K,B12,C, folic acid, copper, and iron studies.    Will sign off. Please reconsult prn.  Case discussed with Attending Dr. Church and Primary team.

## 2018-02-12 NOTE — CONSULT NOTE ADULT - CONSULT REQUESTED DATE/TIME
29-Jul-2017 12:27
03-Jan-2018 13:05
03-Nov-2017 18:48
05-Feb-2018 17:00
07-Sep-2017 10:00
07-Sep-2017 23:10
09-Feb-2018 14:38
09-Feb-2018 18:15
16-Aug-2017 11:16
24-Jul-2017
24-Jul-2017 15:51
25-Jul-2017 11:15
26-Jan-2018 09:40
29-Nov-2017
30-Jan-2018
31-Oct-2017 14:16
31-Jan-2018
12-Feb-2018 13:30

## 2018-02-12 NOTE — PROGRESS NOTE ADULT - SUBJECTIVE AND OBJECTIVE BOX
Psychiatry Follow Up Note    Patient seen briefly at bedside.  Declined interview this morning, then said she was too tired for more extended interview this afternoon.  Reports satisfaction that she is able to eat/drink clears now, although notably offered me her jello because she said she wasn't interested in eating it.  Denies pain.    Assessment/Plan: 57F complicated medical history, admitted since July, now s/p EC fistula take down and revision of aamir-en-y, with history of acting out behaviors during admission (requesting extra Dilaudid, trying to obtain food while NPO), with recent exacerbation in unpleasant behaviors following surgery, now appearing calmer after able to take clears.  Patient unable to eat for many months which has been particularly difficult for her due to her lifelong preoccupation with food.  Patient has been notably very slow to engage in psychotherapy and even less willing to engage recently; disappointed with speed of post-op recovery.    -Would continue Lexapro and Valium as ordered  -Continue to set limits with patient regarding pain management, nutritional status, etc for safety of patient.    -Consider reapplying sign in patient's room with clear instructions regarding what patient is allowed to eat and drink.  -Will continue to follow 2-3 times weekly for supportive psychotherapy    Millie Hatfield MD  [facility] Psychiatry Attending

## 2018-02-12 NOTE — PROVIDER CONTACT NOTE (OTHER) - ACTION/TREATMENT ORDERED:
Instructed to reinforce wound vac dressing
covering nurse states that MD Powell and notified and rounded immediately - 5 mg iv metoprolol given.   HR returned to 90
zyvox to be reordered stat and tyelnol AK to be ordered
Malgorzata canceled for 1am dose. trough to be drawn at 10am tomorrow morning
To reinforced the wound vac dressing.
labs & 1liter Bolus LR given at bedside after putting pt. back in bed.
DANNI Purcell notified. Recheck BP in 30 min. Continue to monitor.
DANNI Purcell notified. Will come to push Labetalol 10 mg IVP. Continue to assess.
Ordred to premedicate pt before wound vac dressing change
TO BE DETERMINED...PT IN NAD AT PRESENT...
Applied pressure dsg to neck. Pt given Benadryl PO & Tylenol PO for abd pain. Xray done @bedside. DANNI Mondragon removed TLC Left IJ and applied dsg. Asst Nurse MGBETTY Beverly &  LORNA Wilkinson aware.
Monitor.

## 2018-02-12 NOTE — PROVIDER CONTACT NOTE (CHANGE IN STATUS NOTIFICATION) - NAME OF MD/NP/PA/DO NOTIFIED:
Dr. Pete Ross
Dr. Willian Ibanez
santos jenkins
Julien Mora MD & DANNI Mckeon
Julien Mora MD & PRESTON Mckeon PA
Dr. Pete Ross

## 2018-02-12 NOTE — PROVIDER CONTACT NOTE (CRITICAL VALUE NOTIFICATION) - ASSESSMENT
Hemoglobin = 6.7 down from 7.0 on 2/10
Dressing on left lower abdomen looks clean dry and intact. BP 99/65, , temp 99.6, oxygen sat 96% respirations 16.
MRSA grown from catheter tip done on 10/4

## 2018-02-12 NOTE — PROGRESS NOTE ADULT - PROBLEM SELECTOR PLAN 4
Obstructive 2/2 multiple abdominal surgeries. Now resolved s/p percutaneous cholecystostomy.  -Management as per primary team. Enterocutaneous fistula now s/p repair and closure. Pain is well controlled. Surgical sites are c/d/i. Drain in place with serosanguinous drainage.  -Management as per primary team.  -Pain control: adequate on non-opioid regimen.  -DVT Ppx: SQH  -Encourage incentive spirometry.

## 2018-02-12 NOTE — PROGRESS NOTE ADULT - PROBLEM SELECTOR PROBLEM 7
Severe protein-calorie malnutrition

## 2018-02-12 NOTE — CONSULT NOTE ADULT - CONSULT REASON
Sepsis
Bilateral hearing loss
Determine source of infection
Elevation of BUN
Enterocutaneous fistula
Esophageal stricture
HD monitoring
HD monitoring
Jaundice, ileus, risk of bowel perforation
Rehabilitation and placement
Urinary Incontinence.
abdominal wall reconstruction
central venous access
evaluation for PICC line placement
evaluation for PICC line placement
pre-operative clearance
S/p Exlap, hx of gastric bypass w/ complicated course by MDR infections c/b PNA, effusions.
SVT

## 2018-02-12 NOTE — PROGRESS NOTE ADULT - PROBLEM SELECTOR PLAN 5
Stable at this time.  -C/w Lexapro 20mg daily.  -Long history of valium use 5mg at bedtime for anxiety/sleep. Likely now dependent. Previously also taking Benadryl 50mg for sleep, but has been off. Would NOT resume benadryl. Obstructive 2/2 multiple abdominal surgeries. Now resolved s/p percutaneous cholecystostomy.  -Management as per primary team.

## 2018-02-12 NOTE — PROVIDER CONTACT NOTE (CRITICAL VALUE NOTIFICATION) - ACTION/TREATMENT ORDERED:
No instructions at this time.
repeat H/H now
No intervention
Hold heparin for an hour and restart with a new order
MD Powell collected type and screen. Instructed to transfer the pt.
MD Sukh pabon

## 2018-02-12 NOTE — PROVIDER CONTACT NOTE (CHANGE IN STATUS NOTIFICATION) - SITUATION
7pm Temperature rectally 101, u/o is 25cc, abdomen on left upper & lower quadrant & left groin redness noted. Md & PA at bedside to assess patient
At 21:02 T100.6 HR98 /83 RR16 O2sat 95%RA.
At 2357 T99  /99 RR19 O2sat 93%RA.
Spo2 lowest 89%. pulse ox changed to finger & then forehead. no changes. lungs sound clear. no secretions when suctioned via ET tube or orally.
pt has heart rate of 185/min .no chest pain or difficult to breath  bp119/78
At 06:05am T98.4  /88 RR19 O2sat 93%RA. Pt asymptomatic.

## 2018-02-12 NOTE — CONSULT NOTE ADULT - ATTENDING COMMENTS
SVT in setting of multiple comorbidities. Currently in sinus. Can address this as outpatient unless it recurs.

## 2018-02-12 NOTE — PROGRESS NOTE ADULT - SUBJECTIVE AND OBJECTIVE BOX
Chief Complaint/Reason for Consult: svt  INTERVAL HPI: svt overnight resolved with IVP and hydration, noted hgb 5.6  	  MEDICATIONS:  labetalol Injectable 10 milliGRAM(s) IV Push every 2 hours PRN  losartan 50 milliGRAM(s) Oral daily  metoprolol     tartrate 12.5 milliGRAM(s) Oral two times a day  metoprolol    tartrate Injectable 2.5 milliGRAM(s) IV Push every 6 hours PRN      sodium chloride 3%  Inhalation 4 milliLiter(s) Inhalation once    diazepam    Tablet 5 milliGRAM(s) Oral at bedtime  escitalopram 20 milliGRAM(s) Oral daily    bisacodyl Suppository 10 milliGRAM(s) Rectal daily  pantoprazole  Injectable 40 milliGRAM(s) IV Push two times a day    insulin lispro (HumaLOG) corrective regimen sliding scale   SubCutaneous Before meals and at bedtime    fat emulsion (Plant Based) 20% IVPB 50 Gram(s) IV Intermittent once  heparin  flush 100 Units/mL Injectable 100 Unit(s) IV Push every other day  heparin  Injectable 5000 Unit(s) SubCutaneous every 8 hours  lactated ringers. 1000 milliLiter(s) IV Continuous <Continuous>  Parenteral Nutrition - Adult 1 Each TPN Continuous <Continuous>  Parenteral Nutrition - Adult 1 Each TPN Continuous <Continuous>      REVIEW OF SYSTEMS:  [x] As per HPI  CONSTITUTIONAL: No fever, weight loss, or fatigue  RESPIRATORY: No cough, wheezing, chills or hemoptysis; No Shortness of Breath  CARDIOVASCULAR: No chest pain, palpitations, dizziness, or leg swelling  GASTROINTESTINAL: No abdominal or epigastric pain. No nausea, vomiting, or hematemesis; No diarrhea or constipation. No melena or hematochezia.  MUSCULOSKELETAL: No joint pain or swelling; No muscle, back, or extremity pain  [x] All others negative	  [ ] Unable to obtain    PHYSICAL EXAM:  T(C): 37.4 (02-12-18 @ 13:56), Max: 37.6 (02-11-18 @ 20:25)  HR: 84 (02-12-18 @ 12:40) (84 - 186)  BP: 116/58 (02-12-18 @ 12:40) (89/53 - 147/91)  RR: 18 (02-12-18 @ 12:40) (16 - 24)  SpO2: 93% (02-12-18 @ 12:40) (92% - 98%)  Wt(kg): --  I&O's Summary    11 Feb 2018 07:01  -  12 Feb 2018 07:00  --------------------------------------------------------  IN: 1770 mL / OUT: 477 mL / NET: 1293 mL    12 Feb 2018 07:01  -  12 Feb 2018 14:45  --------------------------------------------------------  IN: 575 mL / OUT: 150 mL / NET: 425 mL          Appearance: Normal	  HEENT:   Normal oral mucosa  Cardiovascular: Normal S1 S2, No JVD, No murmurs, No edema  Respiratory: Lungs clear to auscultation	  Gastrointestinal:  Soft, Non-tender, + BS	  Extremities: Normal range of motion, No clubbing, cyanosis or edema  Vascular: Peripheral pulses palpable 2+ bilaterally    TELEMETRY: 	    ECG:   	  RADIOLOGY:   CXR:  CT:  US:    CARDIAC TESTING:  Echocardiogram:  Catheterization:  Stress Test:      LABS:	 	    CARDIAC MARKERS:                                  9.1    15.5  )-----------( 322      ( 12 Feb 2018 04:06 )             28.2     02-12    136  |  101  |  16  ----------------------------<  95  5.1   |  22  |  0.77    Ca    8.6      12 Feb 2018 04:06  Phos  3.7     02-12  Mg     1.8     02-12    TPro  6.1  /  Alb  2.4<L>  /  TBili  2.0<H>  /  DBili  x   /  AST  33  /  ALT  33  /  AlkPhos  395<H>  02-12    proBNP:   Lipid Profile:   HgA1c:   TSH:     ASSESSMENT/PLAN: 	    # SVT - EP consult recs appreciated  recommend Lopressor 12.5po bid, and Loressor 2.5mg IVP q6 PRN SVT  Keep Hgb>8mmHg  aggressive hydration      #HTN please DC Labateolol PRN order    #CV Prevention -   q3mo Fasting Lipid Profile, Goal LDL<100, statin as tolerated.  q6week TSH check  q3mo 25-OHD Vitamin D Level, Goal 50, supplement as tolerated

## 2018-02-12 NOTE — PROGRESS NOTE ADULT - PROBLEM SELECTOR PLAN 6
2/2 blood loss from multiple operative procedures and serial phlebotomy. Remains stable.  -Low overnight likely 2/2 abdominal bleeding when drain came out. S/p transfusion of 2 units pRBCs. Now stable.  -Goal Hgb > 7 to minimize the risk of cardiovascular and neurologic morbidity.  -Maintain active type and screen. Minimize blood draws. Stable at this time.  -C/w Lexapro 20mg daily.  -Long history of valium use 5mg at bedtime for anxiety/sleep. Likely now dependent. Previously also taking Benadryl 50mg for sleep, but has been off. Would NOT resume benadryl.

## 2018-02-12 NOTE — CONSULT NOTE ADULT - ASSESSMENT
Assessment:  58 yo F with prolonged hospitalization and pmhx as above with episode of SVT in the setting of hgb 5.6.  -would add metoprolol 12.5mg BID to help prevent recurrence  -will discuss options of SVT ablation to patient.

## 2018-02-13 LAB
ALBUMIN SERPL ELPH-MCNC: 2.1 G/DL — LOW (ref 3.3–5)
ALP SERPL-CCNC: 289 U/L — HIGH (ref 40–120)
ALT FLD-CCNC: 24 U/L — SIGNIFICANT CHANGE UP (ref 10–45)
ANION GAP SERPL CALC-SCNC: 12 MMOL/L — SIGNIFICANT CHANGE UP (ref 5–17)
AST SERPL-CCNC: 27 U/L — SIGNIFICANT CHANGE UP (ref 10–40)
BILIRUB DIRECT SERPL-MCNC: 0.7 MG/DL — HIGH (ref 0–0.2)
BILIRUB INDIRECT FLD-MCNC: 0.8 MG/DL — SIGNIFICANT CHANGE UP (ref 0.2–1)
BILIRUB SERPL-MCNC: 1.5 MG/DL — HIGH (ref 0.2–1.2)
BUN SERPL-MCNC: 13 MG/DL — SIGNIFICANT CHANGE UP (ref 7–23)
CALCIUM SERPL-MCNC: 8.7 MG/DL — SIGNIFICANT CHANGE UP (ref 8.4–10.5)
CHLORIDE SERPL-SCNC: 102 MMOL/L — SIGNIFICANT CHANGE UP (ref 96–108)
CO2 SERPL-SCNC: 22 MMOL/L — SIGNIFICANT CHANGE UP (ref 22–31)
CREAT SERPL-MCNC: 0.73 MG/DL — SIGNIFICANT CHANGE UP (ref 0.5–1.3)
GLUCOSE BLDC GLUCOMTR-MCNC: 123 MG/DL — HIGH (ref 70–99)
GLUCOSE BLDC GLUCOMTR-MCNC: 126 MG/DL — HIGH (ref 70–99)
GLUCOSE BLDC GLUCOMTR-MCNC: 130 MG/DL — HIGH (ref 70–99)
GLUCOSE BLDC GLUCOMTR-MCNC: 136 MG/DL — HIGH (ref 70–99)
GLUCOSE SERPL-MCNC: 122 MG/DL — HIGH (ref 70–99)
HCT VFR BLD CALC: 23.5 % — LOW (ref 34.5–45)
HGB BLD-MCNC: 7.5 G/DL — LOW (ref 11.5–15.5)
MAGNESIUM SERPL-MCNC: 1.9 MG/DL — SIGNIFICANT CHANGE UP (ref 1.6–2.6)
MCHC RBC-ENTMCNC: 29.6 PG — SIGNIFICANT CHANGE UP (ref 27–34)
MCHC RBC-ENTMCNC: 31.9 G/DL — LOW (ref 32–36)
MCV RBC AUTO: 92.9 FL — SIGNIFICANT CHANGE UP (ref 80–100)
PHOSPHATE SERPL-MCNC: 2.9 MG/DL — SIGNIFICANT CHANGE UP (ref 2.5–4.5)
PLATELET # BLD AUTO: 263 K/UL — SIGNIFICANT CHANGE UP (ref 150–400)
POTASSIUM SERPL-MCNC: 4.2 MMOL/L — SIGNIFICANT CHANGE UP (ref 3.5–5.3)
POTASSIUM SERPL-SCNC: 4.2 MMOL/L — SIGNIFICANT CHANGE UP (ref 3.5–5.3)
PROT SERPL-MCNC: 5.8 G/DL — LOW (ref 6–8.3)
RBC # BLD: 2.53 M/UL — LOW (ref 3.8–5.2)
RBC # FLD: 17.7 % — HIGH (ref 10.3–16.9)
SODIUM SERPL-SCNC: 136 MMOL/L — SIGNIFICANT CHANGE UP (ref 135–145)
WBC # BLD: 9.6 K/UL — SIGNIFICANT CHANGE UP (ref 3.8–10.5)
WBC # FLD AUTO: 9.6 K/UL — SIGNIFICANT CHANGE UP (ref 3.8–10.5)

## 2018-02-13 PROCEDURE — 71045 X-RAY EXAM CHEST 1 VIEW: CPT | Mod: 26

## 2018-02-13 RX ORDER — ELECTROLYTE SOLUTION,INJ
1 VIAL (ML) INTRAVENOUS
Qty: 0 | Refills: 0 | Status: DISCONTINUED | OUTPATIENT
Start: 2018-02-13 | End: 2018-02-13

## 2018-02-13 RX ORDER — MAGNESIUM SULFATE 500 MG/ML
2 VIAL (ML) INJECTION ONCE
Qty: 0 | Refills: 0 | Status: COMPLETED | OUTPATIENT
Start: 2018-02-13 | End: 2018-02-13

## 2018-02-13 RX ORDER — ACETAMINOPHEN 500 MG
1000 TABLET ORAL ONCE
Qty: 0 | Refills: 0 | Status: COMPLETED | OUTPATIENT
Start: 2018-02-13 | End: 2018-02-13

## 2018-02-13 RX ADMIN — PANTOPRAZOLE SODIUM 40 MILLIGRAM(S): 20 TABLET, DELAYED RELEASE ORAL at 18:19

## 2018-02-13 RX ADMIN — Medication 12.5 MILLIGRAM(S): at 06:30

## 2018-02-13 RX ADMIN — PANTOPRAZOLE SODIUM 40 MILLIGRAM(S): 20 TABLET, DELAYED RELEASE ORAL at 06:31

## 2018-02-13 RX ADMIN — Medication 100 UNIT(S): at 12:18

## 2018-02-13 RX ADMIN — Medication 2.5 MILLIGRAM(S): at 22:40

## 2018-02-13 RX ADMIN — SODIUM CHLORIDE 50 MILLILITER(S): 9 INJECTION, SOLUTION INTRAVENOUS at 19:15

## 2018-02-13 RX ADMIN — Medication 12.5 MILLIGRAM(S): at 18:19

## 2018-02-13 RX ADMIN — Medication 10 MILLIGRAM(S): at 12:19

## 2018-02-13 RX ADMIN — HEPARIN SODIUM 5000 UNIT(S): 5000 INJECTION INTRAVENOUS; SUBCUTANEOUS at 15:00

## 2018-02-13 RX ADMIN — Medication 50 GRAM(S): at 09:57

## 2018-02-13 RX ADMIN — Medication 1 EACH: at 19:15

## 2018-02-13 RX ADMIN — LOSARTAN POTASSIUM 50 MILLIGRAM(S): 100 TABLET, FILM COATED ORAL at 06:31

## 2018-02-13 RX ADMIN — ESCITALOPRAM OXALATE 20 MILLIGRAM(S): 10 TABLET, FILM COATED ORAL at 12:19

## 2018-02-13 RX ADMIN — HEPARIN SODIUM 5000 UNIT(S): 5000 INJECTION INTRAVENOUS; SUBCUTANEOUS at 21:08

## 2018-02-13 RX ADMIN — HEPARIN SODIUM 5000 UNIT(S): 5000 INJECTION INTRAVENOUS; SUBCUTANEOUS at 06:31

## 2018-02-13 RX ADMIN — Medication 400 MILLIGRAM(S): at 22:40

## 2018-02-13 RX ADMIN — Medication 5 MILLIGRAM(S): at 21:08

## 2018-02-13 NOTE — PROGRESS NOTE ADULT - SUBJECTIVE AND OBJECTIVE BOX
Physical Medicine and Rehabilitation Progress Note    AISSATOU DAVIS    MRN-[id number]    Patient is a 57y old  Female who presents with a chief complaint of enterocutaneous fistula (24 Jul 2017 14:15)      Vital Signs Last 24 Hrs  T(C): 36.4 (13 Feb 2018 09:11), Max: 38.7 (13 Feb 2018 05:20)  T(F): 97.5 (13 Feb 2018 09:11), Max: 101.6 (13 Feb 2018 05:20)  HR: 108 (13 Feb 2018 12:20) (82 - 108)  BP: 121/66 (13 Feb 2018 12:20) (121/66 - 167/75)  BP(mean): 87 (13 Feb 2018 12:20) (84 - 108)  RR: 18 (13 Feb 2018 12:20) (18 - 18)  SpO2: 91% (13 Feb 2018 12:20) (91% - 96%)    Current Functional Status in Physical Therapy:  OOB in chair, more alert and stable, in fairly  good spirits. no pain, deconditioned and  obese.  Weakness,    ray. in proximal muscle.    Bed Mobility:    Transfers: needs minimal to mod assistance  to stand and transfer from chair to bed.    Ambulation:      Impression:  1. Deconditioned.  2/ s/p multiple abdominal surgery.  3. Obesity.  4. r/o neuropathy / myopathy for weakness.      Recommendations:  1.  Continue PT   2. Subacute  rehab.   placement

## 2018-02-13 NOTE — PROGRESS NOTE ADULT - ASSESSMENT
57 F s/p  esophagojejunostomy revision c/b EC fistula now s/p medical optimization of EC fistula and return to OR for EC fistula take down and subsequent revision of her aamir-en-y (1/30). Transferred back to SICU 2/5 for jaundice + bowel distension found to have functional biliary obstruction s/p perc cholecystostomy placement    Neuro: acetaminophen PRN, zofran prn, diazapam 5 qhs  CV: normotensive goals  Pulm: NC  FENGI: BariCLD, PPI, reglan, supp/enemas, TPN/Lipids  : Voiding  Endo: ISS  ID: discontinued zosyn (1/30-2/6), melany (1/30-2/6), Vanc (1/30-1/31)   Ppx: SCDs, SQH 57 F s/p  esophagojejunostomy revision c/b EC fistula now s/p medical optimization of EC fistula and return to OR for EC fistula take down and subsequent revision of her aamir-en-y (1/30). Transferred back to SICU 2/5 for jaundice + bowel distension found to have functional biliary obstruction s/p perc cholecystostomy placement    Neuro: acetaminophen PRN, zofran prn, diazapam 5 qhs  CV: normotensive goals  Pulm: NC  FENGI: BariCLD, PPI, reglan, supp/enemas, TPN/Lipids  : Voiding  Endo: ISS  ID: discontinued zosyn (1/30-2/6), melany (1/30-2/6), Vanc (1/30-1/31)   Ppx: SCDs, SQH  encourage ambulation  f/u nutrition recs concerning calorie counting  dc planning to [facility] with TPN  f/u AM labs

## 2018-02-13 NOTE — PROGRESS NOTE ADULT - SUBJECTIVE AND OBJECTIVE BOX
O/N: TPN running, TLC examined by Dr. Ellis  2 out of 3 lumens of TLC working, VSS. BRAYDEN  2/12: cards recs metoprolol standing&prn. EP consulted for possible ablation for SVT. TPN reordered, repeat trops 0.02. refused to work with PT. minimal PO intake O/N: TPN running, TLC examined by Dr. Ellis  2 out of 3 lumens of TLC working, VSS. BRAYDEN  2/12: cards recs metoprolol standing&prn. EP consulted for possible ablation for SVT. TPN reordered, repeat trops 0.02. refused to work with PT. minimal PO intake      SUBJECTIVE:  pt seen at bedside, complains of pain at the left abdomen; tolerating PO intake; refused PT yesterday    MEDICATIONS  (STANDING):  bisacodyl Suppository 10 milliGRAM(s) Rectal daily  diazepam    Tablet 5 milliGRAM(s) Oral at bedtime  escitalopram 20 milliGRAM(s) Oral daily  heparin  flush 100 Units/mL Injectable 100 Unit(s) IV Push every other day  heparin  Injectable 5000 Unit(s) SubCutaneous every 8 hours  insulin lispro (HumaLOG) corrective regimen sliding scale   SubCutaneous Before meals and at bedtime  lactated ringers. 1000 milliLiter(s) (50 mL/Hr) IV Continuous <Continuous>  losartan 50 milliGRAM(s) Oral daily  metoprolol     tartrate 12.5 milliGRAM(s) Oral two times a day  pantoprazole  Injectable 40 milliGRAM(s) IV Push two times a day  Parenteral Nutrition - Adult 1 Each (63 mL/Hr) TPN Continuous <Continuous>  sodium chloride 3%  Inhalation 4 milliLiter(s) Inhalation once    MEDICATIONS  (PRN):  metoprolol    tartrate Injectable 2.5 milliGRAM(s) IV Push every 6 hours PRN HR >110      Vital Signs Last 24 Hrs  T(C): 38.7 (13 Feb 2018 05:20), Max: 38.7 (13 Feb 2018 05:20)  T(F): 101.6 (13 Feb 2018 05:20), Max: 101.6 (13 Feb 2018 05:20)  HR: 94 (13 Feb 2018 04:57) (84 - 96)  BP: 138/63 (13 Feb 2018 04:57) (116/58 - 167/75)  BP(mean): 90 (13 Feb 2018 04:57) (81 - 108)  RR: 18 (13 Feb 2018 04:57) (18 - 18)  SpO2: 96% (13 Feb 2018 04:57) (93% - 96%)    PHYSICAL EXAM:      Constitutional: A&Ox3    Respiratory: non labored breathing, no respiratory distress    Cardiovascular: NSR, RRR    Gastrointestinal: soft, mild tenderness at LUQ, nondistended    Extremities: (-) edema          I&O's Detail    12 Feb 2018 07:01  -  13 Feb 2018 07:00  --------------------------------------------------------  IN:    lactated ringers.: 970 mL    TPN (Total Parenteral Nutrition): 126 mL  Total IN: 1096 mL    OUT:    Drain: 280 mL  Total OUT: 280 mL    Total NET: 816 mL          LABS:                        9.1    15.5  )-----------( 322      ( 12 Feb 2018 04:06 )             28.2     02-12    136  |  101  |  16  ----------------------------<  95  5.1   |  22  |  0.77    Ca    8.6      12 Feb 2018 04:06  Phos  3.7     02-12  Mg     1.8     02-12    TPro  6.1  /  Alb  2.4<L>  /  TBili  2.0<H>  /  DBili  x   /  AST  33  /  ALT  33  /  AlkPhos  395<H>  02-12

## 2018-02-13 NOTE — PROGRESS NOTE ADULT - SUBJECTIVE AND OBJECTIVE BOX
Patient found sleeping comfortably but when arroused said where had surgery now hurts  spent about 30 minutes last week and again this morning trying to stimulate to work with PT which she refused yesterday  On CT scan last week there is no sign of obstruction tbili is stable and alk phos is going down  she is having bowel function  I have continued the TPN at night as protein levels still low and trying to help with strength  There are no intra abdominal collections on ct  there are pleueral effusions   The current biggest issue and explained in depth to  is the risk of nosocomial infection  Azucena states wants to go to [facility] shower but is very resistant to work with rehab and goals center around medications and getting foods  We have all tried to explain that medically we are in a positive situation but can regress with pnuemonia  has had some cardiac supraventricular rhythms that seem to be controllled by beta blockade  additionally wbc is 15 k  there is small seroma in midline  if keeps rising would consider c diff as well  needs to move to prevent pnuemomia

## 2018-02-14 LAB
ALBUMIN SERPL ELPH-MCNC: 2 G/DL — LOW (ref 3.3–5)
ALP SERPL-CCNC: 281 U/L — HIGH (ref 40–120)
ALT FLD-CCNC: 33 U/L — SIGNIFICANT CHANGE UP (ref 10–45)
ANION GAP SERPL CALC-SCNC: 8 MMOL/L — SIGNIFICANT CHANGE UP (ref 5–17)
APPEARANCE UR: CLEAR — SIGNIFICANT CHANGE UP
AST SERPL-CCNC: 32 U/L — SIGNIFICANT CHANGE UP (ref 10–40)
BILIRUB SERPL-MCNC: 1.4 MG/DL — HIGH (ref 0.2–1.2)
BILIRUB UR-MCNC: NEGATIVE — SIGNIFICANT CHANGE UP
BUN SERPL-MCNC: 14 MG/DL — SIGNIFICANT CHANGE UP (ref 7–23)
CALCIUM SERPL-MCNC: 9 MG/DL — SIGNIFICANT CHANGE UP (ref 8.4–10.5)
CHLORIDE SERPL-SCNC: 105 MMOL/L — SIGNIFICANT CHANGE UP (ref 96–108)
CO2 SERPL-SCNC: 27 MMOL/L — SIGNIFICANT CHANGE UP (ref 22–31)
COLOR SPEC: YELLOW — SIGNIFICANT CHANGE UP
CREAT SERPL-MCNC: 0.69 MG/DL — SIGNIFICANT CHANGE UP (ref 0.5–1.3)
DIFF PNL FLD: NEGATIVE — SIGNIFICANT CHANGE UP
GLUCOSE BLDC GLUCOMTR-MCNC: 101 MG/DL — HIGH (ref 70–99)
GLUCOSE BLDC GLUCOMTR-MCNC: 102 MG/DL — HIGH (ref 70–99)
GLUCOSE BLDC GLUCOMTR-MCNC: 135 MG/DL — HIGH (ref 70–99)
GLUCOSE BLDC GLUCOMTR-MCNC: 148 MG/DL — HIGH (ref 70–99)
GLUCOSE SERPL-MCNC: 140 MG/DL — HIGH (ref 70–99)
GLUCOSE UR QL: NEGATIVE — SIGNIFICANT CHANGE UP
HCT VFR BLD CALC: 22.9 % — LOW (ref 34.5–45)
HGB BLD-MCNC: 7.1 G/DL — LOW (ref 11.5–15.5)
KETONES UR-MCNC: NEGATIVE — SIGNIFICANT CHANGE UP
LEUKOCYTE ESTERASE UR-ACNC: (no result)
MAGNESIUM SERPL-MCNC: 2.1 MG/DL — SIGNIFICANT CHANGE UP (ref 1.6–2.6)
MCHC RBC-ENTMCNC: 28.9 PG — SIGNIFICANT CHANGE UP (ref 27–34)
MCHC RBC-ENTMCNC: 31 G/DL — LOW (ref 32–36)
MCV RBC AUTO: 93.1 FL — SIGNIFICANT CHANGE UP (ref 80–100)
NITRITE UR-MCNC: NEGATIVE — SIGNIFICANT CHANGE UP
PH UR: 7 — SIGNIFICANT CHANGE UP (ref 5–8)
PHOSPHATE SERPL-MCNC: 3.3 MG/DL — SIGNIFICANT CHANGE UP (ref 2.5–4.5)
PLATELET # BLD AUTO: 295 K/UL — SIGNIFICANT CHANGE UP (ref 150–400)
POTASSIUM SERPL-MCNC: 4.6 MMOL/L — SIGNIFICANT CHANGE UP (ref 3.5–5.3)
POTASSIUM SERPL-SCNC: 4.6 MMOL/L — SIGNIFICANT CHANGE UP (ref 3.5–5.3)
PROT SERPL-MCNC: 5.9 G/DL — LOW (ref 6–8.3)
PROT UR-MCNC: NEGATIVE MG/DL — SIGNIFICANT CHANGE UP
RBC # BLD: 2.46 M/UL — LOW (ref 3.8–5.2)
RBC # FLD: 18.3 % — HIGH (ref 10.3–16.9)
SODIUM SERPL-SCNC: 140 MMOL/L — SIGNIFICANT CHANGE UP (ref 135–145)
SP GR SPEC: 1.01 — SIGNIFICANT CHANGE UP (ref 1–1.03)
UROBILINOGEN FLD QL: 0.2 E.U./DL — SIGNIFICANT CHANGE UP
WBC # BLD: 8.6 K/UL — SIGNIFICANT CHANGE UP (ref 3.8–10.5)
WBC # FLD AUTO: 8.6 K/UL — SIGNIFICANT CHANGE UP (ref 3.8–10.5)

## 2018-02-14 RX ORDER — ACETAMINOPHEN 500 MG
650 TABLET ORAL ONCE
Qty: 0 | Refills: 0 | Status: COMPLETED | OUTPATIENT
Start: 2018-02-14 | End: 2018-02-14

## 2018-02-14 RX ORDER — CALCIUM CARBONATE 500(1250)
1 TABLET ORAL DAILY
Qty: 0 | Refills: 0 | Status: DISCONTINUED | OUTPATIENT
Start: 2018-02-14 | End: 2018-02-23

## 2018-02-14 RX ORDER — SODIUM CHLORIDE 9 MG/ML
1000 INJECTION, SOLUTION INTRAVENOUS
Qty: 0 | Refills: 0 | Status: DISCONTINUED | OUTPATIENT
Start: 2018-02-14 | End: 2018-02-15

## 2018-02-14 RX ORDER — PREGABALIN 225 MG/1
500 CAPSULE ORAL DAILY
Qty: 0 | Refills: 0 | Status: DISCONTINUED | OUTPATIENT
Start: 2018-02-14 | End: 2018-02-23

## 2018-02-14 RX ORDER — ACETAMINOPHEN 500 MG
1000 TABLET ORAL ONCE
Qty: 0 | Refills: 0 | Status: COMPLETED | OUTPATIENT
Start: 2018-02-14 | End: 2018-02-14

## 2018-02-14 RX ADMIN — SODIUM CHLORIDE 100 MILLILITER(S): 9 INJECTION, SOLUTION INTRAVENOUS at 13:52

## 2018-02-14 RX ADMIN — ESCITALOPRAM OXALATE 20 MILLIGRAM(S): 10 TABLET, FILM COATED ORAL at 13:07

## 2018-02-14 RX ADMIN — Medication 400 MILLIGRAM(S): at 11:57

## 2018-02-14 RX ADMIN — PANTOPRAZOLE SODIUM 40 MILLIGRAM(S): 20 TABLET, DELAYED RELEASE ORAL at 18:26

## 2018-02-14 RX ADMIN — Medication 650 MILLIGRAM(S): at 02:38

## 2018-02-14 RX ADMIN — Medication 12.5 MILLIGRAM(S): at 18:26

## 2018-02-14 RX ADMIN — Medication 1000 MILLIGRAM(S): at 12:15

## 2018-02-14 RX ADMIN — Medication 10 MILLIGRAM(S): at 13:07

## 2018-02-14 RX ADMIN — HEPARIN SODIUM 5000 UNIT(S): 5000 INJECTION INTRAVENOUS; SUBCUTANEOUS at 21:10

## 2018-02-14 RX ADMIN — HEPARIN SODIUM 5000 UNIT(S): 5000 INJECTION INTRAVENOUS; SUBCUTANEOUS at 14:34

## 2018-02-14 RX ADMIN — Medication 12.5 MILLIGRAM(S): at 06:59

## 2018-02-14 RX ADMIN — Medication 5 MILLIGRAM(S): at 21:10

## 2018-02-14 RX ADMIN — Medication 650 MILLIGRAM(S): at 02:08

## 2018-02-14 RX ADMIN — LOSARTAN POTASSIUM 50 MILLIGRAM(S): 100 TABLET, FILM COATED ORAL at 06:59

## 2018-02-14 RX ADMIN — HEPARIN SODIUM 5000 UNIT(S): 5000 INJECTION INTRAVENOUS; SUBCUTANEOUS at 07:00

## 2018-02-14 RX ADMIN — PANTOPRAZOLE SODIUM 40 MILLIGRAM(S): 20 TABLET, DELAYED RELEASE ORAL at 07:00

## 2018-02-14 NOTE — PROGRESS NOTE ADULT - SUBJECTIVE AND OBJECTIVE BOX
O/N: temp 101.6 tylenol given, pan cultures: CXR, (left pleurel effusion) Bcx, UA (pending) [email or web address]  2/13: up with PT, nutrition consult for calorie counting, stage 2 bariatric diet, dc planning MICHELE with TPN, TPN ordered O/N: temp 101.6 tylenol given, pan cultures: CXR, (left pleurel effusion) Bcx, UA (pending) VSS.BRAYDEN  2/13: up with PT, nutrition consult for calorie counting, stage 2 bariatric diet, dc planning MICHELE with TPN, TPN ordered      SUBJECTIVE:  pt seen at bedside, did not offer any complaints at this time. tolerating PO intake    MEDICATIONS  (STANDING):  bisacodyl Suppository 10 milliGRAM(s) Rectal daily  diazepam    Tablet 5 milliGRAM(s) Oral at bedtime  escitalopram 20 milliGRAM(s) Oral daily  heparin  flush 100 Units/mL Injectable 100 Unit(s) IV Push every other day  heparin  Injectable 5000 Unit(s) SubCutaneous every 8 hours  insulin lispro (HumaLOG) corrective regimen sliding scale   SubCutaneous Before meals and at bedtime  lactated ringers. 1000 milliLiter(s) (50 mL/Hr) IV Continuous <Continuous>  losartan 50 milliGRAM(s) Oral daily  metoprolol     tartrate 12.5 milliGRAM(s) Oral two times a day  pantoprazole  Injectable 40 milliGRAM(s) IV Push two times a day  Parenteral Nutrition - Adult 1 Each (63 mL/Hr) TPN Continuous <Continuous>  sodium chloride 3%  Inhalation 4 milliLiter(s) Inhalation once    MEDICATIONS  (PRN):  metoprolol    tartrate Injectable 2.5 milliGRAM(s) IV Push every 6 hours PRN HR >110      Vital Signs Last 24 Hrs  T(C): 36.7 (14 Feb 2018 05:02), Max: 38.7 (13 Feb 2018 22:04)  T(F): 98 (14 Feb 2018 05:02), Max: 101.6 (13 Feb 2018 22:04)  HR: 92 (14 Feb 2018 05:16) (82 - 108)  BP: 146/65 (14 Feb 2018 05:16) (121/66 - 181/84)  BP(mean): 94 (14 Feb 2018 05:16) (87 - 120)  RR: 18 (14 Feb 2018 05:16) (17 - 18)  SpO2: 96% (14 Feb 2018 05:16) (91% - 96%)    PHYSICAL EXAM:      Constitutional: A&Ox3    Respiratory: non labored breathing, no respiratory distress    Cardiovascular: NSR, RRR    Gastrointestinal: soft, nondistended, nontender, minimal bloody drainage from left CHECO site, incision healed    Extremities: (-) edema        I&O's Detail    13 Feb 2018 07:01  -  14 Feb 2018 07:00  --------------------------------------------------------  IN:    Fat Emulsion 20%: 83.2 mL    lactated ringers.: 850 mL    Solution: 50 mL    TPN (Total Parenteral Nutrition): 1134 mL  Total IN: 2117.2 mL    OUT:    Drain: 400 mL  Total OUT: 400 mL    Total NET: 1717.2 mL          LABS:                        7.1    8.6   )-----------( 295      ( 14 Feb 2018 05:54 )             22.9     02-14    140  |  105  |  14  ----------------------------<  140<H>  4.6   |  27  |  0.69    Ca    9.0      14 Feb 2018 05:54  Phos  3.3     02-14  Mg     2.1     02-14    TPro  5.9<L>  /  Alb  2.0<L>  /  TBili  1.4<H>  /  DBili  x   /  AST  32  /  ALT  33  /  AlkPhos  281<H>  02-14

## 2018-02-14 NOTE — PROGRESS NOTE ADULT - ASSESSMENT
57 F s/p  esophagojejunostomy revision c/b EC fistula now s/p medical optimization of EC fistula and return to OR for EC fistula take down and subsequent revision of her aamir-en-y (1/30). Transferred back to SICU 2/5 for jaundice + bowel distension found to have functional biliary obstruction s/p perc cholecystostomy placement    Neuro: acetaminophen PRN, zofran prn, diazapam 5 qhs  CV: normotensive goals  Pulm: NC  FENGI: BariCLD, PPI, reglan, supp/enemas, TPN/Lipids  : Voiding  Endo: ISS  ID: discontinued zosyn (1/30-2/6), melany (1/30-2/6), Vanc (1/30-1/31)   Ppx: SCDs, SQH  Lines: L IJ (1/.31-)  Wounds: midline wound with dry gauze dressing. SubQ CHECO x 2, cholecystostomy (2/6-) 57 F s/p  esophagojejunostomy revision c/b EC fistula now s/p medical optimization of EC fistula and return to OR for EC fistula take down and subsequent revision of her aamir-en-y (1/30). Transferred back to SICU 2/5 for jaundice + bowel distension found to have functional biliary obstruction s/p perc cholecystostomy placement    Neuro: acetaminophen PRN, zofran prn, diazapam 5 qhs  CV: normotensive goals  Pulm: NC  FENGI: BariCLD, PPI, reglan, supp/enemas, TPN/Lipids  : Voiding  Endo: ISS  ID: discontinued zosyn (1/30-2/6), melany (1/30-2/6), Vanc (1/30-1/31)   Ppx: SCDs, SQH  Lines: L IJ (1/.31-)  Wounds: midline wound with dry gauze dressing. SubQ CHECO x 2, cholecystostomy (2/6-)  Physical Therapy

## 2018-02-14 NOTE — CHART NOTE - NSCHARTNOTEFT_GEN_A_CORE
Admitting Diagnosis:   Patient is a 57y old  Female who presents with a chief complaint of enterocutaneous fistula (24 Jul 2017 14:15)      PAST MEDICAL & SURGICAL HISTORY:  Reflux  Obesity  DVT (deep venous thrombosis): 2013 neck  Diverticulitis  Hypertension  Elective surgery: abodominal wall surgery  dec 2016  Elective surgery: NOV 2016 gastric bypass revision  Gastric bypass status for obesity: gastric sleeve, 6/2012  S/P breast biopsy: 2011, left  S/P colon resection: 2011  S/P knee surgery: repair 2009, 2011  right; left  Umbilical hernia: 2000  S/P appendectomy: 1975  S/P tonsillectomy: 1967    Current Nutrition Order: Phase 2 Bariatric Diet w/ Health Shake TID (900kcal, 27g pro)  >> TPN via central venous line: 1500 TV (63ml/hr); 330g D, 93g AA, No lipids today (1494kcal, 1.78g/kg IBW pro, GIR 3.25 -based on most recent wt taken)  Per previous TPN orders pt has not been receiving 12 hour feeds as discussed 2/9. Relative to previous TPN order, protein remained the same and dextrose increased.   At present, pt is meeting lower end of estimated protein needs and averaging 1780 calories (34kcal/kg IBW).    PO Intake: Good (%) [   ]  Fair (50-75%) [   ] Poor (<25%) [ x  ]  Calorie count ordered-not started until today.  Per pts dietary recall she had pureed vegetables yesterday and some juice today. Pt was only wanting juice and water today. Discussed protein option first prior to having juice.  Per RN pt requires a lot of encouragement at meal times as pt was very somnolent today. Appreciate support provided at meals.   Per team- pt is to have 5 small high protein meals/day. This can be done by saving a protein option (i.e., greek yogurt, or apple sauce w/ prostat) for between meals.     GI Issues:   Denies nausea/vomiting/diarrhea/constipation at present.  RUQ perc steven drain in place draining bilious fluid.    Pain: Pain being medically managed. Pain controlled.    Skin Integrity: surgical sites from s/p repair and closure of entercutaneous fistula. Drain placed. Jigar score of 15. Sacrum pressure ulcer.       Labs:   02-14    140  |  105  |  14  ----------------------------<  140<H>  4.6   |  27  |  0.69    Ca    9.0      14 Feb 2018 05:54  Phos  3.3     02-14  Mg     2.1     02-14    TPro  5.9<L>  /  Alb  2.0<L>  /  TBili  1.4<H>  /  DBili  x   /  AST  32  /  ALT  33  /  AlkPhos  281<H>  02-14    CAPILLARY BLOOD GLUCOSE      POCT Blood Glucose.: 135 mg/dL (14 Feb 2018 11:02)  POCT Blood Glucose.: 148 mg/dL (14 Feb 2018 05:38)  POCT Blood Glucose.: 130 mg/dL (13 Feb 2018 21:58)  POCT Blood Glucose.: 126 mg/dL (13 Feb 2018 17:22)      Medications:  MEDICATIONS  (STANDING):  bisacodyl Suppository 10 milliGRAM(s) Rectal daily  dextrose 10% + sodium chloride 0.9%. 1000 milliLiter(s) (100 mL/Hr) IV Continuous <Continuous>  diazepam    Tablet 5 milliGRAM(s) Oral at bedtime  escitalopram 20 milliGRAM(s) Oral daily  heparin  flush 100 Units/mL Injectable 100 Unit(s) IV Push every other day  heparin  Injectable 5000 Unit(s) SubCutaneous every 8 hours  insulin lispro (HumaLOG) corrective regimen sliding scale   SubCutaneous Before meals and at bedtime  levoFLOXacin IVPB      losartan 50 milliGRAM(s) Oral daily  metoprolol     tartrate 12.5 milliGRAM(s) Oral two times a day  pantoprazole  Injectable 40 milliGRAM(s) IV Push two times a day  Parenteral Nutrition - Adult 1 Each (63 mL/Hr) TPN Continuous <Continuous>  sodium chloride 3%  Inhalation 4 milliLiter(s) Inhalation once    MEDICATIONS  (PRN):  metoprolol    tartrate Injectable 2.5 milliGRAM(s) IV Push every 6 hours PRN HR >110      Weight:  Most recent wt taken pre-op is 155.1lb/70.5 kg (1/30)  Would like to obtain new standing wait to compare to previous standing wt trends. Suspect current BW would be inaccurate 2/2 generalized edema    Weight Change:   155.1lb (1/30)  174.8lb (1/15)  175.1lb (1/8/18)  172.5lb (12/19)  171.5lb (12/14)  167lb (11/13)  164lb (10/17)  219lb (8/1)    Estimated energy needs using 52 kg IBW:  increased needs per wound, pressure ulcer and post-op.   30-35 kcal/kg (3536-8030 kcal).   1.8-2 g/kg ( g protein).  30-35 mL/kg (6510-8875 mL fluid).      Subjective:   Pt seen resting in bed- was somnolent appearing. Pt continues to receive primary nutrition via TPN- however per team there is now plan to d/c central line as pt spiked fever. PO diet was advanced 2/13 to Phase 2 bariatric pureed/soft. Calorie count started today 2/14. Per dietary recall pt reported consuming pureed vegetables yesterday and juice today. Intake was confirmed by RN. Pt requires a lot of encouragement at meal times. Appreciate support provided at meals. Per discussion with team, patient is expected to have 5 small high protein meals a day -discussed w/ PA that through coordination of the host and RN pt can have high protein option from tray (greek yogurt or prostat w/ apple sauce) to be saved for between meals. A NFPE was attempted 2/9 however generalized edema made it difficult to detect muscle wasting/fat loss- pt continues to appear edematous. Last pre-alb and triglycerides taken 2/8. Pre-alb 14, No CRP in labs to assess for inflammatory response vs PCM, . BG trending 100-149. Discussed w/ team obtaining new labs for CRP, Pre-alb & triglycerides. Discussed with team that at present pt is likely getting <100kcal/day and <10g pro/day. Recommend continuing w/ nutrition support when medically feasible as her kcal and protein needs are high.     Previous Nutrition Diagnosis: Increased nutrient needs RT increased demand for nutrients AEB wound, pressure ulcer healing and s/p EC fistula take down and subsequent revision of her aamir-en-y     Active [ x  ]  Resolved [   ]    If resolved, new PES:  N/A    Goal: Pt to meet >75% estimated needs PO.     Recommendations:  1) Pt to continue with Phase 2 Bariatric Pureed/Soft as tolerated. Team will need to put in a miscellaneous nursing order for pt to get high protein snacks between meals.  RD communicated plan with current RN  3) Consider assessing for other routes for nutrition  2) If pt is to continue with TPN recommend 240g D, 104g AA, 50g 20% lipids TIW (1732kcal, 2g/kg IBW pro, GIR 2.36)  >> If MD prefers it to be over a 12 hour cycle: GIR is 4.73 - TPN should be ramped up the first hour and ramped down last hour (bag infusing at half the rate first hour, at full rate 10 hours and half rate last hour)  3) Please obtain new lab values for pre-alb, CRP, triglycerides  4) Trend TG and adjust lipids PRN per MD discretion. If trending up, consider 20g 20% lipids vs 50g.   4c) Please obtain standing weight when feasible.  5) If pt is solely on PO diet recommend MVI daily PO  Plan of care and nutrition recommendations discussed at length with team.     Education: Pt requires continuous education on NPO status. Understands purpose of TPN.     Risk Level: High [  x ] Moderate [   ] Low [   ].

## 2018-02-15 LAB
ALBUMIN SERPL ELPH-MCNC: 2.4 G/DL — LOW (ref 3.3–5)
ALP SERPL-CCNC: 293 U/L — HIGH (ref 40–120)
ALT FLD-CCNC: 35 U/L — SIGNIFICANT CHANGE UP (ref 10–45)
ANION GAP SERPL CALC-SCNC: 11 MMOL/L — SIGNIFICANT CHANGE UP (ref 5–17)
AST SERPL-CCNC: 33 U/L — SIGNIFICANT CHANGE UP (ref 10–40)
BILIRUB SERPL-MCNC: 1.2 MG/DL — SIGNIFICANT CHANGE UP (ref 0.2–1.2)
BUN SERPL-MCNC: 10 MG/DL — SIGNIFICANT CHANGE UP (ref 7–23)
CALCIUM SERPL-MCNC: 8.8 MG/DL — SIGNIFICANT CHANGE UP (ref 8.4–10.5)
CHLORIDE SERPL-SCNC: 104 MMOL/L — SIGNIFICANT CHANGE UP (ref 96–108)
CO2 SERPL-SCNC: 23 MMOL/L — SIGNIFICANT CHANGE UP (ref 22–31)
CREAT SERPL-MCNC: 0.69 MG/DL — SIGNIFICANT CHANGE UP (ref 0.5–1.3)
CRP SERPL-MCNC: 7.26 MG/DL — HIGH (ref 0–0.4)
GLUCOSE BLDC GLUCOMTR-MCNC: 103 MG/DL — HIGH (ref 70–99)
GLUCOSE BLDC GLUCOMTR-MCNC: 105 MG/DL — HIGH (ref 70–99)
GLUCOSE SERPL-MCNC: 107 MG/DL — HIGH (ref 70–99)
HCT VFR BLD CALC: 23.9 % — LOW (ref 34.5–45)
HGB BLD-MCNC: 7.1 G/DL — LOW (ref 11.5–15.5)
MAGNESIUM SERPL-MCNC: 1.8 MG/DL — SIGNIFICANT CHANGE UP (ref 1.6–2.6)
MCHC RBC-ENTMCNC: 28.2 PG — SIGNIFICANT CHANGE UP (ref 27–34)
MCHC RBC-ENTMCNC: 29.7 G/DL — LOW (ref 32–36)
MCV RBC AUTO: 94.8 FL — SIGNIFICANT CHANGE UP (ref 80–100)
PHOSPHATE SERPL-MCNC: 3.4 MG/DL — SIGNIFICANT CHANGE UP (ref 2.5–4.5)
PLATELET # BLD AUTO: 310 K/UL — SIGNIFICANT CHANGE UP (ref 150–400)
POTASSIUM SERPL-MCNC: 4.3 MMOL/L — SIGNIFICANT CHANGE UP (ref 3.5–5.3)
POTASSIUM SERPL-SCNC: 4.3 MMOL/L — SIGNIFICANT CHANGE UP (ref 3.5–5.3)
PREALB SERPL-MCNC: 14 MG/DL — LOW (ref 20–40)
PROT SERPL-MCNC: 6.2 G/DL — SIGNIFICANT CHANGE UP (ref 6–8.3)
RBC # BLD: 2.52 M/UL — LOW (ref 3.8–5.2)
RBC # FLD: 18.3 % — HIGH (ref 10.3–16.9)
SODIUM SERPL-SCNC: 138 MMOL/L — SIGNIFICANT CHANGE UP (ref 135–145)
TRIGL SERPL-MCNC: 197 MG/DL — HIGH (ref 10–149)
WBC # BLD: 7.9 K/UL — SIGNIFICANT CHANGE UP (ref 3.8–10.5)
WBC # FLD AUTO: 7.9 K/UL — SIGNIFICANT CHANGE UP (ref 3.8–10.5)

## 2018-02-15 RX ORDER — ONDANSETRON 8 MG/1
4 TABLET, FILM COATED ORAL ONCE
Qty: 0 | Refills: 0 | Status: COMPLETED | OUTPATIENT
Start: 2018-02-15 | End: 2018-02-15

## 2018-02-15 RX ORDER — SODIUM CHLORIDE 9 MG/ML
1000 INJECTION, SOLUTION INTRAVENOUS
Qty: 0 | Refills: 0 | Status: DISCONTINUED | OUTPATIENT
Start: 2018-02-15 | End: 2018-02-17

## 2018-02-15 RX ORDER — LORATADINE 10 MG/1
10 TABLET ORAL DAILY
Qty: 0 | Refills: 0 | Status: DISCONTINUED | OUTPATIENT
Start: 2018-02-15 | End: 2018-02-23

## 2018-02-15 RX ORDER — ACETAMINOPHEN 500 MG
1000 TABLET ORAL ONCE
Qty: 0 | Refills: 0 | Status: COMPLETED | OUTPATIENT
Start: 2018-02-15 | End: 2018-02-15

## 2018-02-15 RX ORDER — MAGNESIUM SULFATE 500 MG/ML
1 VIAL (ML) INJECTION ONCE
Qty: 0 | Refills: 0 | Status: COMPLETED | OUTPATIENT
Start: 2018-02-15 | End: 2018-02-15

## 2018-02-15 RX ORDER — SODIUM CHLORIDE 9 MG/ML
800 INJECTION INTRAMUSCULAR; INTRAVENOUS; SUBCUTANEOUS ONCE
Qty: 0 | Refills: 0 | Status: COMPLETED | OUTPATIENT
Start: 2018-02-15 | End: 2018-02-15

## 2018-02-15 RX ADMIN — Medication 1 TABLET(S): at 13:16

## 2018-02-15 RX ADMIN — Medication 100 GRAM(S): at 09:51

## 2018-02-15 RX ADMIN — PANTOPRAZOLE SODIUM 40 MILLIGRAM(S): 20 TABLET, DELAYED RELEASE ORAL at 05:49

## 2018-02-15 RX ADMIN — HEPARIN SODIUM 5000 UNIT(S): 5000 INJECTION INTRAVENOUS; SUBCUTANEOUS at 05:49

## 2018-02-15 RX ADMIN — SODIUM CHLORIDE 400 MILLILITER(S): 9 INJECTION INTRAMUSCULAR; INTRAVENOUS; SUBCUTANEOUS at 11:01

## 2018-02-15 RX ADMIN — Medication 400 MILLIGRAM(S): at 02:38

## 2018-02-15 RX ADMIN — LORATADINE 10 MILLIGRAM(S): 10 TABLET ORAL at 13:16

## 2018-02-15 RX ADMIN — Medication 400 MILLIGRAM(S): at 21:06

## 2018-02-15 RX ADMIN — Medication 12.5 MILLIGRAM(S): at 05:49

## 2018-02-15 RX ADMIN — ESCITALOPRAM OXALATE 20 MILLIGRAM(S): 10 TABLET, FILM COATED ORAL at 13:16

## 2018-02-15 RX ADMIN — PANTOPRAZOLE SODIUM 40 MILLIGRAM(S): 20 TABLET, DELAYED RELEASE ORAL at 17:35

## 2018-02-15 RX ADMIN — ONDANSETRON 4 MILLIGRAM(S): 8 TABLET, FILM COATED ORAL at 22:03

## 2018-02-15 RX ADMIN — HEPARIN SODIUM 5000 UNIT(S): 5000 INJECTION INTRAVENOUS; SUBCUTANEOUS at 13:16

## 2018-02-15 RX ADMIN — LOSARTAN POTASSIUM 50 MILLIGRAM(S): 100 TABLET, FILM COATED ORAL at 05:49

## 2018-02-15 RX ADMIN — PREGABALIN 500 MICROGRAM(S): 225 CAPSULE ORAL at 13:16

## 2018-02-15 RX ADMIN — Medication 5 MILLIGRAM(S): at 21:06

## 2018-02-15 RX ADMIN — Medication 1000 MILLIGRAM(S): at 03:08

## 2018-02-15 RX ADMIN — Medication 12.5 MILLIGRAM(S): at 17:35

## 2018-02-15 RX ADMIN — HEPARIN SODIUM 5000 UNIT(S): 5000 INJECTION INTRAVENOUS; SUBCUTANEOUS at 21:06

## 2018-02-15 RX ADMIN — Medication 5 MILLIGRAM(S): at 17:35

## 2018-02-15 NOTE — PROGRESS NOTE ADULT - SUBJECTIVE AND OBJECTIVE BOX
O/N: +flatus, + small BM following suppository. VSS. BRAYDEN  2/14: dc'd central line as per Caitlyn, TPN on hold, 5 small meals a day high protein, urine culture with sensitivities pending, nutrition recommendations: CRP, prealbumin, triglycerides, oral multivitamin, oscal, B12 O/N: +flatus, + small BM following suppository. VSS. BRAYDEN  2/14: dc'd central line as per Caitlyn, TPN on hold, 5 small meals a day high protein, urine culture with sensitivities pending, nutrition recommendations: CRP, prealbumin, triglycerides, oral multivitamin, oscal, B12    Patient seen and examined bedside with chief resident. Patient reports that she is feeling okay although does note that she "does not like the greek yogurt".     levoFLOXacin IVPB      levoFLOXacin IVPB 750 milliGRAM(s) IV Intermittent every 24 hours  losartan 50 milliGRAM(s) Oral daily  metoprolol     tartrate 12.5 milliGRAM(s) Oral two times a day      Vital Signs Last 24 Hrs  T(C): 37.4 (15 Feb 2018 04:59), Max: 37.4 (15 Feb 2018 04:59)  T(F): 99.4 (15 Feb 2018 04:59), Max: 99.4 (15 Feb 2018 04:59)  HR: 92 (15 Feb 2018 04:30) (84 - 94)  BP: 145/67 (15 Feb 2018 04:30) (144/74 - 161/78)  BP(mean): 97 (15 Feb 2018 04:30) (97 - 113)  RR: 18 (15 Feb 2018 04:30) (16 - 18)  SpO2: 93% (15 Feb 2018 04:30) (93% - 99%)    I&O's Detail    14 Feb 2018 07:01  -  15 Feb 2018 07:00  --------------------------------------------------------  IN:    dextrose 10% + sodium chloride 0.9%: 1700 mL    TPN (Total Parenteral Nutrition): 315 mL  Total IN: 2015 mL    OUT:    Drain: 850 mL  Total OUT: 850 mL    Total NET: 1165 mL        PHYSICAL EXAM:    Constitutional: NAD  Gastrointestinal: soft. NT.ND. surgical midline incision clean, dry, and intact. percutaneous cholecystomy tube in place.

## 2018-02-15 NOTE — PROGRESS NOTE ADULT - SUBJECTIVE AND OBJECTIVE BOX
56 y/o F s/p cholecystostomy tube placement. No complaints.  Drain output: 850cc/last 24 hours as of this morning. Bilious.  Easily drained with 5cc NS.  Labs reviewed.

## 2018-02-15 NOTE — PROGRESS NOTE ADULT - ASSESSMENT
57 F s/p  esophagojejunostomy revision c/b EC fistula now s/p medical optimization of EC fistula and return to OR for EC fistula take down and subsequent revision of her aamir-en-y (1/30). Transferred back to SICU 2/5 for jaundice + bowel distension found to have functional biliary obstruction s/p perc cholecystostomy placement    Neuro: acetaminophen PRN, zofran prn, diazapam 5 qhs  CV: normotensive goals  Pulm: NC  FENGI: BariCLD, PPI, reglan, supp/enemas, TPN/Lipids  : Voiding  Endo: ISS  ID: discontinued zosyn (1/30-2/6), melany (1/30-2/6), Vanc (1/30-1/31)   Ppx: SCDs, SQH  Lines: L IJ (1/.31-)  Wounds: midline wound with dry gauze dressing. SubQ CHECO x 2, cholecystostomy (2/6-) 57 F s/p  esophagojejunostomy revision c/b EC fistula now s/p medical optimization of EC fistula and return to OR for EC fistula take down and subsequent revision of her aamir-en-y (1/30). Transferred back to SICU 2/5 for jaundice + bowel distension found to have functional biliary obstruction s/p perc cholecystostomy placement    Neuro: acetaminophen PRN, zofran prn, diazapam 5 qhs  CV: normotensive goals  Pulm: NC  FENGI: Regular bariatric phase 2 soft, PPI, stool softener,   : Voiding,   Endo: ISS  ID: discontinued zosyn (1/30-2/6), melany (1/30-2/6), Vanc (1/30-1/31) on levofloxacin for UTI (2/14-)  Ppx: SCDs, SQH  Lines: L IJ (1/.31-2/14)  Wounds: midline wound with dry gauze dressing. , cholecystostomy (2/6-)  Dispo planning: PT

## 2018-02-15 NOTE — PROGRESS NOTE ADULT - SUBJECTIVE AND OBJECTIVE BOX
On followup, the left int jugular catheter was removed. The site is non-indurated, non-inflamed and non-tender. Temp pattern, notes and cultures are followed.

## 2018-02-15 NOTE — PROGRESS NOTE ADULT - SUBJECTIVE AND OBJECTIVE BOX
Psychiatry Follow Up Note    Patient seen at bedside.  Per team not participating fully with PT, not eating Greek yogurt although now allowed 5 high protein meals per day.  On interview patient appearing sleepy, vaguely irritable.  Reports she is relieved to be able to eat but criticizes quality of hospital chicken, says has never liked Greek yogurt.  Claims she refused PT only once because she was tired but that they returned later in the day.  Looking forward to potential discharge to rehab, says she is intending to do more exercise there.       Assessment/Plan: 57F complicated medical history, admitted since July, now s/p EC fistula take down and revision of aamir-en-y, with history of acting out behaviors during admission (requesting extra Dilaudid, trying to obtain food while NPO), exacerbated post-op, now again with very particular requests but calm/defending against underlying distress.  Patient unable to eat for many months which has been particularly difficult for her due to her lifelong preoccupation with food- now that she is able to eat disappointed with particular food offered as she has built up in her mind the gourmet meals she was looking forward to.      -Continue Lexapro and Valium as ordered  -Continue to set limits with patient regarding pain management, nutritional status, etc for safety of patient.    -Will continue to follow with psychology intern 2-3 times weekly for supportive psychotherapy    Millie Hatfield MD   Psychiatry Attending

## 2018-02-16 ENCOUNTER — TRANSCRIPTION ENCOUNTER (OUTPATIENT)
Age: 58
End: 2018-02-16

## 2018-02-16 LAB
-  AMPICILLIN: SIGNIFICANT CHANGE UP
-  CIPROFLOXACIN: SIGNIFICANT CHANGE UP
-  NITROFURANTOIN: SIGNIFICANT CHANGE UP
-  TETRACYCLINE: SIGNIFICANT CHANGE UP
-  VANCOMYCIN: SIGNIFICANT CHANGE UP
ALBUMIN SERPL ELPH-MCNC: 2.4 G/DL — LOW (ref 3.3–5)
ALP SERPL-CCNC: 303 U/L — HIGH (ref 40–120)
ALT FLD-CCNC: 34 U/L — SIGNIFICANT CHANGE UP (ref 10–45)
ANION GAP SERPL CALC-SCNC: 10 MMOL/L — SIGNIFICANT CHANGE UP (ref 5–17)
AST SERPL-CCNC: 27 U/L — SIGNIFICANT CHANGE UP (ref 10–40)
BILIRUB SERPL-MCNC: 1.1 MG/DL — SIGNIFICANT CHANGE UP (ref 0.2–1.2)
BUN SERPL-MCNC: 7 MG/DL — SIGNIFICANT CHANGE UP (ref 7–23)
CALCIUM SERPL-MCNC: 9.3 MG/DL — SIGNIFICANT CHANGE UP (ref 8.4–10.5)
CHLORIDE SERPL-SCNC: 107 MMOL/L — SIGNIFICANT CHANGE UP (ref 96–108)
CO2 SERPL-SCNC: 24 MMOL/L — SIGNIFICANT CHANGE UP (ref 22–31)
CREAT SERPL-MCNC: 0.75 MG/DL — SIGNIFICANT CHANGE UP (ref 0.5–1.3)
CULTURE RESULTS: SIGNIFICANT CHANGE UP
GLUCOSE SERPL-MCNC: 87 MG/DL — SIGNIFICANT CHANGE UP (ref 70–99)
HCT VFR BLD CALC: 25.1 % — LOW (ref 34.5–45)
HGB BLD-MCNC: 7.6 G/DL — LOW (ref 11.5–15.5)
MAGNESIUM SERPL-MCNC: 1.9 MG/DL — SIGNIFICANT CHANGE UP (ref 1.6–2.6)
MCHC RBC-ENTMCNC: 28.8 PG — SIGNIFICANT CHANGE UP (ref 27–34)
MCHC RBC-ENTMCNC: 30.3 G/DL — LOW (ref 32–36)
MCV RBC AUTO: 95.1 FL — SIGNIFICANT CHANGE UP (ref 80–100)
METHOD TYPE: SIGNIFICANT CHANGE UP
ORGANISM # SPEC MICROSCOPIC CNT: SIGNIFICANT CHANGE UP
ORGANISM # SPEC MICROSCOPIC CNT: SIGNIFICANT CHANGE UP
PHOSPHATE SERPL-MCNC: 4 MG/DL — SIGNIFICANT CHANGE UP (ref 2.5–4.5)
PLATELET # BLD AUTO: 359 K/UL — SIGNIFICANT CHANGE UP (ref 150–400)
POTASSIUM SERPL-MCNC: 4.5 MMOL/L — SIGNIFICANT CHANGE UP (ref 3.5–5.3)
POTASSIUM SERPL-SCNC: 4.5 MMOL/L — SIGNIFICANT CHANGE UP (ref 3.5–5.3)
PROT SERPL-MCNC: 6.2 G/DL — SIGNIFICANT CHANGE UP (ref 6–8.3)
RBC # BLD: 2.64 M/UL — LOW (ref 3.8–5.2)
RBC # FLD: 17.9 % — HIGH (ref 10.3–16.9)
SODIUM SERPL-SCNC: 141 MMOL/L — SIGNIFICANT CHANGE UP (ref 135–145)
SPECIMEN SOURCE: SIGNIFICANT CHANGE UP
WBC # BLD: 7.7 K/UL — SIGNIFICANT CHANGE UP (ref 3.8–10.5)
WBC # FLD AUTO: 7.7 K/UL — SIGNIFICANT CHANGE UP (ref 3.8–10.5)

## 2018-02-16 RX ORDER — NITROFURANTOIN MACROCRYSTAL 50 MG
100 CAPSULE ORAL
Qty: 0 | Refills: 0 | Status: COMPLETED | OUTPATIENT
Start: 2018-02-16 | End: 2018-02-23

## 2018-02-16 RX ORDER — NITROFURANTOIN MACROCRYSTAL 50 MG
100 CAPSULE ORAL
Qty: 0 | Refills: 0 | Status: DISCONTINUED | OUTPATIENT
Start: 2018-02-16 | End: 2018-02-16

## 2018-02-16 RX ORDER — MAGNESIUM SULFATE 500 MG/ML
2 VIAL (ML) INJECTION ONCE
Qty: 0 | Refills: 0 | Status: COMPLETED | OUTPATIENT
Start: 2018-02-16 | End: 2018-02-16

## 2018-02-16 RX ORDER — ACETAMINOPHEN 500 MG
650 TABLET ORAL ONCE
Qty: 0 | Refills: 0 | Status: COMPLETED | OUTPATIENT
Start: 2018-02-16 | End: 2018-02-16

## 2018-02-16 RX ORDER — METOCLOPRAMIDE HCL 10 MG
5 TABLET ORAL ONCE
Qty: 0 | Refills: 0 | Status: COMPLETED | OUTPATIENT
Start: 2018-02-16 | End: 2018-02-16

## 2018-02-16 RX ORDER — DOCUSATE SODIUM 100 MG
100 CAPSULE ORAL DAILY
Qty: 0 | Refills: 0 | Status: DISCONTINUED | OUTPATIENT
Start: 2018-02-16 | End: 2018-02-23

## 2018-02-16 RX ADMIN — Medication 5 MILLIGRAM(S): at 18:05

## 2018-02-16 RX ADMIN — HEPARIN SODIUM 5000 UNIT(S): 5000 INJECTION INTRAVENOUS; SUBCUTANEOUS at 15:16

## 2018-02-16 RX ADMIN — PANTOPRAZOLE SODIUM 40 MILLIGRAM(S): 20 TABLET, DELAYED RELEASE ORAL at 05:47

## 2018-02-16 RX ADMIN — Medication 5 MILLIGRAM(S): at 05:47

## 2018-02-16 RX ADMIN — Medication 12.5 MILLIGRAM(S): at 18:01

## 2018-02-16 RX ADMIN — Medication 50 GRAM(S): at 12:30

## 2018-02-16 RX ADMIN — HEPARIN SODIUM 5000 UNIT(S): 5000 INJECTION INTRAVENOUS; SUBCUTANEOUS at 21:34

## 2018-02-16 RX ADMIN — Medication 1 TABLET(S): at 12:32

## 2018-02-16 RX ADMIN — LOSARTAN POTASSIUM 50 MILLIGRAM(S): 100 TABLET, FILM COATED ORAL at 05:47

## 2018-02-16 RX ADMIN — Medication 5 MILLIGRAM(S): at 18:01

## 2018-02-16 RX ADMIN — Medication 5 MILLIGRAM(S): at 21:35

## 2018-02-16 RX ADMIN — LORATADINE 10 MILLIGRAM(S): 10 TABLET ORAL at 12:32

## 2018-02-16 RX ADMIN — PREGABALIN 500 MICROGRAM(S): 225 CAPSULE ORAL at 12:32

## 2018-02-16 RX ADMIN — Medication 650 MILLIGRAM(S): at 02:54

## 2018-02-16 RX ADMIN — HEPARIN SODIUM 5000 UNIT(S): 5000 INJECTION INTRAVENOUS; SUBCUTANEOUS at 05:47

## 2018-02-16 RX ADMIN — ESCITALOPRAM OXALATE 20 MILLIGRAM(S): 10 TABLET, FILM COATED ORAL at 12:32

## 2018-02-16 RX ADMIN — Medication 100 MILLIGRAM(S): at 21:45

## 2018-02-16 RX ADMIN — PANTOPRAZOLE SODIUM 40 MILLIGRAM(S): 20 TABLET, DELAYED RELEASE ORAL at 18:03

## 2018-02-16 RX ADMIN — Medication 100 MILLIGRAM(S): at 18:01

## 2018-02-16 NOTE — CHART NOTE - NSCHARTNOTEFT_GEN_A_CORE
Admitting Diagnosis:   Patient is a 57y old  Female who presents with a chief complaint of enterocutaneous fistula (24 Jul 2017 14:15)      PAST MEDICAL & SURGICAL HISTORY:  Reflux  Obesity  DVT (deep venous thrombosis): 2013 neck  Diverticulitis  Hypertension  Elective surgery: abodominal wall surgery  dec 2016  Elective surgery: NOV 2016 gastric bypass revision  Gastric bypass status for obesity: gastric sleeve, 6/2012  S/P breast biopsy: 2011, left  S/P colon resection: 2011  S/P knee surgery: repair 2009, 2011  right; left  Umbilical hernia: 2000  S/P appendectomy: 1975  S/P tonsillectomy: 1967      Current Nutrition Order: Phase 2 Bariatric Diet w/ Health Shake TID (900kcal, 27g pro)    PO Intake: Good (%) [   ]  Fair (50-75%) [   ] Poor (<25%) [ x  ]  Calorie count ordered-not started until 2/15.  Yesterday: 50% pureed meats & 50% pureed vegetables  O/N 1/2 cup juice  Pt requires encouragement and support during meal times.   Per team- pt is to have 5 small high protein meals/day. This can be done by saving a protein option (i.e., greek yogurt, or apple sauce w/ prostat) for between meals.     GI Issues:   Per RN no c/o nausea/vomiting/diarrhea/constipation at present.  RUQ perc steven drain in place draining bilious fluid.    Pain: Pain being medically managed. Pain controlled.    Skin Integrity: surgical sites from s/p repair and closure of entercutaneous fistula. Drain placed. Jigar score of 15. Stage II pressure ulcer sacral spine.    Labs:   02-16    141  |  107  |  7   ----------------------------<  87  4.5   |  24  |  0.75    Ca    9.3      16 Feb 2018 08:26  Phos  4.0     02-16  Mg     1.9     02-16    TPro  6.2  /  Alb  2.4<L>  /  TBili  1.1  /  DBili  x   /  AST  27  /  ALT  34  /  AlkPhos  303<H>  02-16    CAPILLARY BLOOD GLUCOSE          Medications:  MEDICATIONS  (STANDING):  bisacodyl 5 milliGRAM(s) Oral two times a day  calcium carbonate 1250 mG (OsCal) 1 Tablet(s) Oral daily  cyanocobalamin 500 MICROGram(s) Oral daily  dextrose 10% + sodium chloride 0.9%. 1000 milliLiter(s) (50 mL/Hr) IV Continuous <Continuous>  diazepam    Tablet 5 milliGRAM(s) Oral at bedtime  escitalopram 20 milliGRAM(s) Oral daily  heparin  Injectable 5000 Unit(s) SubCutaneous every 8 hours  levoFLOXacin IVPB      levoFLOXacin IVPB 750 milliGRAM(s) IV Intermittent every 24 hours  loratadine 10 milliGRAM(s) Oral daily  losartan 50 milliGRAM(s) Oral daily  magnesium sulfate  IVPB 2 Gram(s) IV Intermittent once  metoprolol     tartrate 12.5 milliGRAM(s) Oral two times a day  multivitamin 1 Tablet(s) Oral daily  pantoprazole  Injectable 40 milliGRAM(s) IV Push two times a day  sodium chloride 3%  Inhalation 4 milliLiter(s) Inhalation once    MEDICATIONS  (PRN):  metoprolol    tartrate Injectable 2.5 milliGRAM(s) IV Push every 6 hours PRN HR >110    Weight:  Most recent wt taken pre-op is 155.1lb/70.5 kg (1/30)  Would like to obtain new standing wait to compare to previous standing wt trends. Discussed w/ RN obtaining new standing wt when pt is doing PT.  Suspect current BW would be inaccurate 2/2 generalized edema    Weight Change:   155.1lb (1/30)  174.8lb (1/15)  175.1lb (1/8/18)  172.5lb (12/19)  171.5lb (12/14)  167lb (11/13)  164lb (10/17)  219lb (8/1)    Estimated energy needs using 52 kg IBW:  increased needs per wound, pressure ulcer and post-op.   30-35 kcal/kg (1669-8211 kcal).   1.8-2 g/kg ( g protein).  30-35 mL/kg (2755-0220 mL fluid).      Subjective:   Pt seen resting in bed- declined assessment stating she wanted to sleep. No longer receiving nutrition via TPN. PO diet was advanced 2/13 to Phase 2 bariatric pureed/soft. Calorie count started on 9UR 2/15. Per RN pt consumed 50% pureed meats & 50% pureed vegetables, had 1/2 cup juice O/N. Pt requires a lot of encouragement at meal times. Appreciate support provided at meals. Per discussion with team, patient is expected to have 5 small high protein meals a day -discussed w/ PA that through coordination of the host and RN pt can have high protein option from tray (greek yogurt or prostat w/ apple sauce) to be saved for between meals. Discussed plan with RN and CNAs. Appreciate new labs. Pre-alb 14,CRP 7.26, triglycerides 197. Please continue to trend these. Discussed with team that at present pt is likely getting <200kcal/day and <10g pro/day. Recommend continuing w/ nutrition support when medically feasible as her kcal and protein needs are high.     Previous Nutrition Diagnosis: Increased nutrient needs RT increased demand for nutrients AEB wound, pressure ulcer healing and s/p EC fistula take down and subsequent revision of her aamir-en-y     Active [ x  ]  Resolved [   ]    If resolved, new PES:  N/A    Goal: Pt to meet >75% estimated needs PO.     Recommendations:  1) Pt to continue with Phase 2 Bariatric Pureed/Soft as tolerated. Team will need to put in a miscellaneous nursing order for pt to get high protein snacks between meals.  RD communicated plan with current RN  3) Consider assessing for other routes for nutrition   2) If pt is to continue with TPN recommend 240g D, 104g AA, 50g 20% lipids TIW (1732kcal, 2g/kg IBW pro, GIR 2.36)  >> If MD prefers it to be over a 12 hour cycle: GIR is 4.73 - TPN should be ramped up the first hour and ramped down last hour (bag infusing at half the rate first hour, at full rate 10 hours and half rate last hour)  3) Please obtain new lab values for pre-alb, CRP, triglycerides  4) Trend TG and adjust lipids PRN per MD discretion. If trending up, consider 20g 20% lipids vs 50g.   4c) Please obtain standing weight when feasible.  5) If pt is solely on PO diet recommend MVI daily PO, continue w/ current MVI, OsCal and B12 order.   Plan of care and nutrition recommendations discussed at length with team.     Education: Pt requires continuous education on NPO status. Understands purpose of TPN.     Risk Level: High [  x ] Moderate [   ] Low [   ].

## 2018-02-16 NOTE — PROGRESS NOTE ADULT - SUBJECTIVE AND OBJECTIVE BOX
Physical Medicine and Rehabilitation Progress Note    AISSATOU DAVIS    MRN-[id number]    Patient is a 57y old  Female who presents with a chief complaint of enterocutaneous fistula (24 Jul 2017 14:15)      Vital Signs Last 24 Hrs  T(C): 36.6 (16 Feb 2018 08:30), Max: 38.2 (15 Feb 2018 20:39)  T(F): 97.9 (16 Feb 2018 08:30), Max: 100.8 (15 Feb 2018 20:39)  HR: 90 (16 Feb 2018 08:30) (87 - 92)  BP: 151/76 (16 Feb 2018 08:30) (134/84 - 160/80)  BP(mean): --  RR: 17 (16 Feb 2018 08:30) (17 - 17)  SpO2: 92% (16 Feb 2018 08:30) (92% - 96%)    Current Functional Status in Physical Therapy: lying in bed comfortably.  More alert, in good spirits and stable.  Deconditioned On bed side PT, more cooperative to therapy.    Bed Mobility:  mnimal to moderate  assistance    Transfers: min. to mod. . assistance   with rolling walker    Ambulation: moderate assistance with rolling walker and showing slow progress      Impression:  1. Deconditioned  2. s/p multiple abdominal surgeries  3.  besity      Recommendations:  1. Continue PT for therapeutic ex.  and  gait training  2.  Subacute rehab.  placement

## 2018-02-16 NOTE — PROGRESS NOTE ADULT - ASSESSMENT
57 F s/p  esophagojejunostomy revision c/b EC fistula now s/p medical optimization of EC fistula and return to OR for EC fistula take down and subsequent revision of her aamir-en-y (1/30). Transferred back to SICU 2/5 for jaundice + bowel distension found to have functional biliary obstruction s/p perc cholecystostomy placement    Neuro: acetaminophen PRN, zofran prn, diazapam 5 qhs  CV: normotensive goals  Pulm: NC  FENGI: Regular bariatric phase 2 soft, PPI, stool softener,   : Voiding,   Endo: ISS  ID: discontinued zosyn (1/30-2/6), melany (1/30-2/6), Vanc (1/30-1/31) on levofloxacin for UTI (2/14-)  Ppx: SCDs, SQH  Lines: L IJ (1/.31-2/14)  Wounds: midline wound with dry gauze dressing. , cholecystostomy (2/6-)  Dispo planning: PT 57 F s/p  esophagojejunostomy revision c/b EC fistula now s/p medical optimization of EC fistula and return to OR for EC fistula take down and subsequent revision of her aamir-en-y (1/30). Transferred back to SICU 2/5 for jaundice + bowel distension found to have functional biliary obstruction s/p perc cholecystostomy placement    Neuro: acetaminophen PRN, zofran prn, diazapam 5 qhs  CV: normotensive goals  Pulm: NC  FENGI: Regular bariatric phase 2 soft, PPI, stool softener,   : Voiding,   Endo: ISS  ID: discontinued zosyn (1/30-2/6), melany (1/30-2/6), Vanc (1/30-1/31) on levofloxacin for UTI (2/14-)  Ppx: SCDs, SQH  Lines: L IJ (1/.31-2/14)  Wounds: midline wound with dry gauze dressing. , cholecystostomy (2/6-)  Dispo planning: [facility] on Monday  clamp biliary tube

## 2018-02-16 NOTE — PROGRESS NOTE ADULT - SUBJECTIVE AND OBJECTIVE BOX
O/N: BRAYDEN, as per IR no evidence of drain tube malfunction  215 : IR was contacted to assess the perc steven tube. awaiting for their recs. minimal PO intake, worked with PT.     POD: 1/30: Ex-lap, resection of EC fistula, SBRx2 with patino handsewn anastomosis, JPx2 (SQ) placement, primary abdominal closure with b/l external oblique release and prevena placement. O/N: BRAYDEN, as per IR no evidence of drain tube malfunction  215 : IR was contacted to assess the perc steevn tube. awaiting for their recs. minimal PO intake, worked with PT.     POD: 1/30: Ex-lap, resection of EC fistula, SBRx2 with patino handsewn anastomosis, JPx2 (SQ) placement, primary abdominal closure with b/l external oblique release and prevena placement.     SUBJECTIVE:  pt seen at bedside, without complaints at this time    MEDICATIONS  (STANDING):  bisacodyl 5 milliGRAM(s) Oral two times a day  calcium carbonate 1250 mG (OsCal) 1 Tablet(s) Oral daily  cyanocobalamin 500 MICROGram(s) Oral daily  dextrose 10% + sodium chloride 0.9%. 1000 milliLiter(s) (50 mL/Hr) IV Continuous <Continuous>  diazepam    Tablet 5 milliGRAM(s) Oral at bedtime  escitalopram 20 milliGRAM(s) Oral daily  heparin  Injectable 5000 Unit(s) SubCutaneous every 8 hours  levoFLOXacin IVPB      levoFLOXacin IVPB 750 milliGRAM(s) IV Intermittent every 24 hours  loratadine 10 milliGRAM(s) Oral daily  losartan 50 milliGRAM(s) Oral daily  metoprolol     tartrate 12.5 milliGRAM(s) Oral two times a day  multivitamin 1 Tablet(s) Oral daily  pantoprazole  Injectable 40 milliGRAM(s) IV Push two times a day  sodium chloride 3%  Inhalation 4 milliLiter(s) Inhalation once    MEDICATIONS  (PRN):  metoprolol    tartrate Injectable 2.5 milliGRAM(s) IV Push every 6 hours PRN HR >110      Vital Signs Last 24 Hrs  T(C): 36.2 (16 Feb 2018 05:45), Max: 38.2 (15 Feb 2018 20:39)  T(F): 97.1 (16 Feb 2018 05:45), Max: 100.8 (15 Feb 2018 20:39)  HR: 92 (16 Feb 2018 05:45) (82 - 92)  BP: 134/84 (16 Feb 2018 05:45) (134/84 - 160/80)  BP(mean): 109 (15 Feb 2018 08:55) (109 - 109)  RR: 17 (16 Feb 2018 05:45) (17 - 18)  SpO2: 94% (16 Feb 2018 05:45) (92% - 96%)    PHYSICAL EXAM:      Constitutional: A&Ox3    Respiratory: non labored breathing, no respiratory distress    Cardiovascular: NSR, RRR    Gastrointestinal: soft, nontender, nondistended, cholecystostomy tube with biliary drainage    Extremities: (-) edema        I&O's Detail    15 Feb 2018 07:01  -  16 Feb 2018 07:00  --------------------------------------------------------  IN:    dextrose 10% + sodium chloride 0.9%.: 1000 mL    Oral Fluid: 100 mL    Sodium Chloride 0.9% IV Bolus: 400 mL    Solution: 150 mL    Solution: 100 mL  Total IN: 1750 mL    OUT:    Drain: 600 mL  Total OUT: 600 mL    Total NET: 1150 mL          LABS:                        7.1    7.9   )-----------( 310      ( 15 Feb 2018 06:53 )             23.9     02-15    138  |  104  |  10  ----------------------------<  107<H>  4.3   |  23  |  0.69    Ca    8.8      15 Feb 2018 06:53  Phos  3.4     02-15  Mg     1.8     02-15    TPro  6.2  /  Alb  2.4<L>  /  TBili  1.2  /  DBili  x   /  AST  33  /  ALT  35  /  AlkPhos  293<H>  02-15

## 2018-02-16 NOTE — PROGRESS NOTE ADULT - NSHPATTENDINGPLANDISCUSS_GEN_ALL_CORE
primary team
Team
Dr. Roach
SICU
patient
patient
primary team
Dr. Brady
Dr. Roach
Team 2 Resident
as above
primary team
Dr. Brady
ozzy and Dr. tucker
primary team
As above
Dr. Brady
primary team
surgical staff
As above
Team 2 Surgery
Dr. Brady, 
Dr. Brady, Dr. Baird
surgical staff
surgical staff
Pete Sheth
Housestaff
Housestaff
As above
Housestaff

## 2018-02-17 LAB
ALBUMIN SERPL ELPH-MCNC: 2.4 G/DL — LOW (ref 3.3–5)
ALP SERPL-CCNC: 408 U/L — HIGH (ref 40–120)
ALT FLD-CCNC: 46 U/L — HIGH (ref 10–45)
ANION GAP SERPL CALC-SCNC: 10 MMOL/L — SIGNIFICANT CHANGE UP (ref 5–17)
AST SERPL-CCNC: 46 U/L — HIGH (ref 10–40)
BILIRUB DIRECT SERPL-MCNC: 0.5 MG/DL — HIGH (ref 0–0.2)
BILIRUB INDIRECT FLD-MCNC: 0.5 MG/DL — SIGNIFICANT CHANGE UP (ref 0.2–1)
BILIRUB SERPL-MCNC: 1 MG/DL — SIGNIFICANT CHANGE UP (ref 0.2–1.2)
BUN SERPL-MCNC: 7 MG/DL — SIGNIFICANT CHANGE UP (ref 7–23)
CALCIUM SERPL-MCNC: 8.9 MG/DL — SIGNIFICANT CHANGE UP (ref 8.4–10.5)
CHLORIDE SERPL-SCNC: 103 MMOL/L — SIGNIFICANT CHANGE UP (ref 96–108)
CO2 SERPL-SCNC: 25 MMOL/L — SIGNIFICANT CHANGE UP (ref 22–31)
CREAT SERPL-MCNC: 0.73 MG/DL — SIGNIFICANT CHANGE UP (ref 0.5–1.3)
GLUCOSE SERPL-MCNC: 87 MG/DL — SIGNIFICANT CHANGE UP (ref 70–99)
HCT VFR BLD CALC: 24.1 % — LOW (ref 34.5–45)
HGB BLD-MCNC: 7.4 G/DL — LOW (ref 11.5–15.5)
MAGNESIUM SERPL-MCNC: 1.7 MG/DL — SIGNIFICANT CHANGE UP (ref 1.6–2.6)
MCHC RBC-ENTMCNC: 28.7 PG — SIGNIFICANT CHANGE UP (ref 27–34)
MCHC RBC-ENTMCNC: 30.7 G/DL — LOW (ref 32–36)
MCV RBC AUTO: 93.4 FL — SIGNIFICANT CHANGE UP (ref 80–100)
PHOSPHATE SERPL-MCNC: 3.5 MG/DL — SIGNIFICANT CHANGE UP (ref 2.5–4.5)
PLATELET # BLD AUTO: 361 K/UL — SIGNIFICANT CHANGE UP (ref 150–400)
POTASSIUM SERPL-MCNC: 4 MMOL/L — SIGNIFICANT CHANGE UP (ref 3.5–5.3)
POTASSIUM SERPL-SCNC: 4 MMOL/L — SIGNIFICANT CHANGE UP (ref 3.5–5.3)
PROT SERPL-MCNC: 6.3 G/DL — SIGNIFICANT CHANGE UP (ref 6–8.3)
RBC # BLD: 2.58 M/UL — LOW (ref 3.8–5.2)
RBC # FLD: 18 % — HIGH (ref 10.3–16.9)
SODIUM SERPL-SCNC: 138 MMOL/L — SIGNIFICANT CHANGE UP (ref 135–145)
WBC # BLD: 7.9 K/UL — SIGNIFICANT CHANGE UP (ref 3.8–10.5)
WBC # FLD AUTO: 7.9 K/UL — SIGNIFICANT CHANGE UP (ref 3.8–10.5)

## 2018-02-17 RX ORDER — MAGNESIUM SULFATE 500 MG/ML
1 VIAL (ML) INJECTION ONCE
Qty: 0 | Refills: 0 | Status: COMPLETED | OUTPATIENT
Start: 2018-02-17 | End: 2018-02-17

## 2018-02-17 RX ORDER — PANTOPRAZOLE SODIUM 20 MG/1
40 TABLET, DELAYED RELEASE ORAL
Qty: 0 | Refills: 0 | Status: DISCONTINUED | OUTPATIENT
Start: 2018-02-17 | End: 2018-02-23

## 2018-02-17 RX ORDER — ACETAMINOPHEN 500 MG
650 TABLET ORAL EVERY 6 HOURS
Qty: 0 | Refills: 0 | Status: DISCONTINUED | OUTPATIENT
Start: 2018-02-17 | End: 2018-02-17

## 2018-02-17 RX ORDER — ACETAMINOPHEN 500 MG
650 TABLET ORAL ONCE
Qty: 0 | Refills: 0 | Status: COMPLETED | OUTPATIENT
Start: 2018-02-17 | End: 2018-02-17

## 2018-02-17 RX ORDER — ONDANSETRON 8 MG/1
4 TABLET, FILM COATED ORAL EVERY 6 HOURS
Qty: 0 | Refills: 0 | Status: COMPLETED | OUTPATIENT
Start: 2018-02-17 | End: 2018-02-17

## 2018-02-17 RX ORDER — DIAZEPAM 5 MG
5 TABLET ORAL AT BEDTIME
Qty: 0 | Refills: 0 | Status: DISCONTINUED | OUTPATIENT
Start: 2018-02-17 | End: 2018-02-23

## 2018-02-17 RX ADMIN — Medication 12.5 MILLIGRAM(S): at 17:55

## 2018-02-17 RX ADMIN — Medication 12.5 MILLIGRAM(S): at 06:15

## 2018-02-17 RX ADMIN — PANTOPRAZOLE SODIUM 40 MILLIGRAM(S): 20 TABLET, DELAYED RELEASE ORAL at 17:58

## 2018-02-17 RX ADMIN — Medication 1 TABLET(S): at 12:34

## 2018-02-17 RX ADMIN — Medication 100 MILLIGRAM(S): at 08:57

## 2018-02-17 RX ADMIN — ONDANSETRON 4 MILLIGRAM(S): 8 TABLET, FILM COATED ORAL at 10:35

## 2018-02-17 RX ADMIN — LOSARTAN POTASSIUM 50 MILLIGRAM(S): 100 TABLET, FILM COATED ORAL at 06:09

## 2018-02-17 RX ADMIN — Medication 5 MILLIGRAM(S): at 21:39

## 2018-02-17 RX ADMIN — Medication 100 MILLIGRAM(S): at 17:58

## 2018-02-17 RX ADMIN — Medication 5 MILLIGRAM(S): at 17:58

## 2018-02-17 RX ADMIN — PANTOPRAZOLE SODIUM 40 MILLIGRAM(S): 20 TABLET, DELAYED RELEASE ORAL at 06:09

## 2018-02-17 RX ADMIN — HEPARIN SODIUM 5000 UNIT(S): 5000 INJECTION INTRAVENOUS; SUBCUTANEOUS at 14:50

## 2018-02-17 RX ADMIN — Medication 5 MILLIGRAM(S): at 06:09

## 2018-02-17 RX ADMIN — HEPARIN SODIUM 5000 UNIT(S): 5000 INJECTION INTRAVENOUS; SUBCUTANEOUS at 21:39

## 2018-02-17 RX ADMIN — LORATADINE 10 MILLIGRAM(S): 10 TABLET ORAL at 12:34

## 2018-02-17 RX ADMIN — Medication 100 GRAM(S): at 10:35

## 2018-02-17 RX ADMIN — PREGABALIN 500 MICROGRAM(S): 225 CAPSULE ORAL at 12:34

## 2018-02-17 RX ADMIN — Medication 650 MILLIGRAM(S): at 17:58

## 2018-02-17 RX ADMIN — ESCITALOPRAM OXALATE 20 MILLIGRAM(S): 10 TABLET, FILM COATED ORAL at 21:39

## 2018-02-17 RX ADMIN — HEPARIN SODIUM 5000 UNIT(S): 5000 INJECTION INTRAVENOUS; SUBCUTANEOUS at 06:09

## 2018-02-17 NOTE — PROGRESS NOTE ADULT - ASSESSMENT
57 F s/p  esophagojejunostomy revision c/b EC fistula now s/p medical optimization of EC fistula and return to OR for EC fistula take down and subsequent revision of her aamir-en-y (1/30). Transferred back to SICU 2/5 for jaundice + bowel distension found to have functional biliary obstruction s/p perc cholecystostomy placement    Neuro: acetaminophen PRN, zofran prn, diazapam 5 qhs  CV: normotensive goals  Pulm: NC  FENGI: Regular bariatric phase 2 soft, PPI, stool softener,   : Voiding,   Endo: ISS  ID: discontinued zosyn (1/30-2/6), melany (1/30-2/6), Vanc (1/30-1/31) on levofloxacin for UTI (2/14-2/16), changed to nitrofurantoin (2/16-)  Ppx: SCDs, SQH  Lines: L IJ (1/.31-2/14)  Wounds: midline wound with dry gauze dressing. , cholecystostomy (2/6-)  Dispo planning: PT 57 F s/p  esophagojejunostomy revision c/b EC fistula now s/p medical optimization of EC fistula and return to OR for EC fistula take down and subsequent revision of her aamir-en-y (1/30). Transferred back to SICU 2/5 for jaundice + bowel distension found to have functional biliary obstruction s/p perc cholecystostomy placement    Neuro: acetaminophen PRN, zofran prn, diazapam 5 qhs  CV: normotensive goals  Pulm: NC  FENGI: Regular bariatric phase 2 soft, PPI, stool softener,   : Voiding,   Endo: ISS  ID: discontinued zosyn (1/30-2/6), melany (1/30-2/6), Vanc (1/30-1/31) on levofloxacin for UTI (2/14-2/16), changed to nitrofurantoin (2/16-)  Ppx: SCDs, SQH  Lines: L IJ (1/.31-2/14)  Wounds: midline wound with dry gauze dressing. , cholecystostomy (2/6-)  Dispo planning: PT, to [facility] possibly monday

## 2018-02-17 NOTE — DISCHARGE NOTE ADULT - PATIENT PORTAL LINK FT
You can access the [facility] Portal, offered by [facility], by registering with the following website: [email or web address]

## 2018-02-17 NOTE — DISCHARGE NOTE ADULT - PLAN OF CARE
To recovery Patient's clinical condition improved during hospital stay. Patient was hemodynamically stable, afebrile, and with a plan for followup upon discharge. Recovery General Discharge Instructions:  Please resume all regular home medications unless specifically advised not to take a particular medication. Also, please take any new medications as prescribed.  Please get plenty of rest, continue to ambulate several times per day, and drink adequate amounts of fluids. Avoid lifting weights greater than 5-10 lbs until you follow-up with your surgeon, who will instruct you further regarding activity restrictions.  Avoid driving or operating heavy machinery while taking pain medications.  Please follow-up with your surgeon Dr Brady 1 week after discharge from [facility]. Please call his office at [phone].  Incision Care:  *Please call your doctor or nurse practitioner if you have increased pain, swelling, redness, or drainage from the incision site.  *Avoid swimming and baths until your follow-up appointment.  *You may shower, and wash surgical incisions with a mild soap and warm water. Gently pat the area dry.  *You have sutures in your incision. This will be removed at your follow up appointment. Recovery and increased protein intake 2) If pt is solely on PO diet recommend MVI daily PO, continue w/ current MVI, OsCal and B12 order. Warning Signs:  Please call your doctor or nurse practitioner if you experience the following:  *You experience new chest pain, pressure, squeezing or tightness.  *New or worsening cough, shortness of breath, or wheeze.  *If you are vomiting and cannot keep down fluids or your medications.  *You are getting dehydrated due to continued vomiting, diarrhea, or other reasons. Signs of dehydration include dry mouth, rapid heartbeat, or feeling dizzy or faint when standing.  *You see blood or dark/black material when you vomit or have a bowel movement.  *You experience burning when you urinate, have blood in your urine, or experience a discharge.  *Your pain is not improving within 8-12 hours or is not gone within 24 hours. Call or return immediately if your pain is getting worse, changes location, or moves to your chest or back.  *You have shaking chills, or fever greater than 101.5 degrees Fahrenheit or 38 degrees Celsius.  *Any change in your symptoms, or any new symptoms that concern you. per steven drain patient has a perc steven drain in place. the patient was instructed to follow up in the office with dr. clemente for drain removal in 4 weeks.

## 2018-02-17 NOTE — DISCHARGE NOTE ADULT - CARE PLAN
Principal Discharge DX:	Bacteremia  Goal:	To recovery  Assessment and plan of treatment:	Patient's clinical condition improved during hospital stay. Patient was hemodynamically stable, afebrile, and with a plan for followup upon discharge. Principal Discharge DX:	Fistula  Goal:	Recovery  Assessment and plan of treatment:	General Discharge Instructions:  Please resume all regular home medications unless specifically advised not to take a particular medication. Also, please take any new medications as prescribed.  Please get plenty of rest, continue to ambulate several times per day, and drink adequate amounts of fluids. Avoid lifting weights greater than 5-10 lbs until you follow-up with your surgeon, who will instruct you further regarding activity restrictions.  Avoid driving or operating heavy machinery while taking pain medications.  Please follow-up with your surgeon Dr Brady 1 week after discharge from [facility]. Please call his office at [phone].  Incision Care:  *Please call your doctor or nurse practitioner if you have increased pain, swelling, redness, or drainage from the incision site.  *Avoid swimming and baths until your follow-up appointment.  *You may shower, and wash surgical incisions with a mild soap and warm water. Gently pat the area dry.  *You have sutures in your incision. This will be removed at your follow up appointment.  Secondary Diagnosis:	Severe protein-calorie malnutrition  Goal:	Recovery and increased protein intake  Assessment and plan of treatment:	2) If pt is solely on PO diet recommend MVI daily PO, continue w/ current MVI, OsCal and B12 order.  Assessment and plan of treatment:	Warning Signs:  Please call your doctor or nurse practitioner if you experience the following:  *You experience new chest pain, pressure, squeezing or tightness.  *New or worsening cough, shortness of breath, or wheeze.  *If you are vomiting and cannot keep down fluids or your medications.  *You are getting dehydrated due to continued vomiting, diarrhea, or other reasons. Signs of dehydration include dry mouth, rapid heartbeat, or feeling dizzy or faint when standing.  *You see blood or dark/black material when you vomit or have a bowel movement.  *You experience burning when you urinate, have blood in your urine, or experience a discharge.  *Your pain is not improving within 8-12 hours or is not gone within 24 hours. Call or return immediately if your pain is getting worse, changes location, or moves to your chest or back.  *You have shaking chills, or fever greater than 101.5 degrees Fahrenheit or 38 degrees Celsius.  *Any change in your symptoms, or any new symptoms that concern you. Principal Discharge DX:	Fistula  Goal:	Recovery  Assessment and plan of treatment:	General Discharge Instructions:  Please resume all regular home medications unless specifically advised not to take a particular medication. Also, please take any new medications as prescribed.  Please get plenty of rest, continue to ambulate several times per day, and drink adequate amounts of fluids. Avoid lifting weights greater than 5-10 lbs until you follow-up with your surgeon, who will instruct you further regarding activity restrictions.  Avoid driving or operating heavy machinery while taking pain medications.  Please follow-up with your surgeon Dr Brady 1 week after discharge from [facility]. Please call his office at [phone].  Incision Care:  *Please call your doctor or nurse practitioner if you have increased pain, swelling, redness, or drainage from the incision site.  *Avoid swimming and baths until your follow-up appointment.  *You may shower, and wash surgical incisions with a mild soap and warm water. Gently pat the area dry.  *You have sutures in your incision. This will be removed at your follow up appointment.  Secondary Diagnosis:	Severe protein-calorie malnutrition  Goal:	Recovery and increased protein intake  Assessment and plan of treatment:	2) If pt is solely on PO diet recommend MVI daily PO, continue w/ current MVI, OsCal and B12 order.  Assessment and plan of treatment:	Warning Signs:  Please call your doctor or nurse practitioner if you experience the following:  *You experience new chest pain, pressure, squeezing or tightness.  *New or worsening cough, shortness of breath, or wheeze.  *If you are vomiting and cannot keep down fluids or your medications.  *You are getting dehydrated due to continued vomiting, diarrhea, or other reasons. Signs of dehydration include dry mouth, rapid heartbeat, or feeling dizzy or faint when standing.  *You see blood or dark/black material when you vomit or have a bowel movement.  *You experience burning when you urinate, have blood in your urine, or experience a discharge.  *Your pain is not improving within 8-12 hours or is not gone within 24 hours. Call or return immediately if your pain is getting worse, changes location, or moves to your chest or back.  *You have shaking chills, or fever greater than 101.5 degrees Fahrenheit or 38 degrees Celsius.  *Any change in your symptoms, or any new symptoms that concern you.  Goal:	per steven drain  Assessment and plan of treatment:	patient has a perc steven drain in place. the patient was instructed to follow up in the office with dr. clemente for drain removal in 4 weeks.

## 2018-02-17 NOTE — DISCHARGE NOTE ADULT - MEDICATION SUMMARY - MEDICATIONS TO TAKE
I will START or STAY ON the medications listed below when I get home from the hospital:    Tylenol 500 mg oral tablet  --  by mouth , As Needed  -- Indication: For Home med     Cozaar 50 mg oral tablet  -- 1 tab(s) by mouth once a day  -- Indication: For Home med     Valium  -- 20 milligram(s) by mouth once a day (at bedtime)  -- Indication: For Home med     mirtazapine 7.5 mg oral tablet  -- 1 tab(s) by mouth once a day (at bedtime)  -- Indication: For Home med     Lexapro 20 mg oral tablet  -- 1 tab(s) by mouth once a day  -- Indication: For Home med     Zofran ODT 4 mg oral tablet, disintegrating  -- 1 tab(s) by mouth 3 times a day, As Needed -for nausea  -- Indication: For Home med     diphenhydrAMINE 25 mg oral capsule  -- 1 cap(s) by mouth once a day (at bedtime), As needed, Insomnia  -- Indication: For Home med     loratadine 10 mg oral tablet  -- 1 tab(s) by mouth once a day  -- Indication: For Home med     Atarax  --   2 times a day  -- Indication: For Home med     nystatin 100,000 units/g topical powder  -- 1 application on skin once a day  -- Indication: For Home med     hydrocortisone 1% topical cream  -- 1 application on skin 2 times a day  -- Indication: For Home med     bisacodyl 5 mg oral delayed release tablet  -- 1 tab(s) by mouth 2 times a day  -- Indication: For Home med     Carafate  --  by mouth , As Needed  -- Indication: For Home med     Protonix 40 mg oral granule, enteric coated  -- 1 each by mouth 2 times a day  -- Indication: For Home med     VESIcare 10 mg oral tablet  -- 1 tab(s) by mouth once a day  -- Indication: For Home med     Multiple Vitamins oral tablet  -- 1 tab(s) by mouth once a day  -- Indication: For Home med     Vitamin B-12 250 mcg oral tablet  -- 1 tab(s) by mouth once a day  -- Indication: For Home med     cholecalciferol oral tablet  -- 2000 unit(s) by mouth once a day  -- Indication: For Home med

## 2018-02-17 NOTE — DISCHARGE NOTE ADULT - CARE PROVIDER_API CALL
Miguel Brady (MD), Surgery  [address] [place] [address]  Phone: [phone]  Fax: [phone]

## 2018-02-17 NOTE — PROGRESS NOTE ADULT - SUBJECTIVE AND OBJECTIVE BOX
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     Patient seen and examined bedside with chief resident. Patient encouraged to work with PT    losartan 50 milliGRAM(s) Oral daily  metoprolol     tartrate 12.5 milliGRAM(s) Oral two times a day  nitrofurantoin (MACROBID) 100 milliGRAM(s) Oral two times a day with meals      Vital Signs Last 24 Hrs  T(C): 37.1 (17 Feb 2018 09:41), Max: 37.5 (16 Feb 2018 14:01)  T(F): 98.8 (17 Feb 2018 09:41), Max: 99.5 (16 Feb 2018 14:01)  HR: 83 (17 Feb 2018 09:41) (83 - 102)  BP: 154/93 (17 Feb 2018 09:41) (138/83 - 154/93)  BP(mean): --  RR: 16 (17 Feb 2018 09:41) (16 - 17)  SpO2: 94% (17 Feb 2018 09:41) (93% - 99%)    I&O's Detail    16 Feb 2018 07:01  -  17 Feb 2018 07:00  --------------------------------------------------------  IN:    dextrose 10% + sodium chloride 0.9%: 600 mL    Oral Fluid: 350 mL  Total IN: 950 mL    OUT:  Total OUT: 0 mL    Total NET: 950 mL      17 Feb 2018 07:01  -  17 Feb 2018 12:39  --------------------------------------------------------  IN:    Oral Fluid: 200 mL  Total IN: 200 mL    OUT:  Total OUT: 0 mL    Total NET: 200 mL        PHYSICAL EXAM:    Constitutional: NAD.  Gastrointestinal: soft. NT.ND. surgical incision clean ,dry ,and intact.

## 2018-02-17 NOTE — DISCHARGE NOTE ADULT - HOSPITAL COURSE
55 yo female w/PMHx of HTN, gastric bypass in 2002 complicated by stricture, corkscrewed sleeve and chronic leak, revised was on 11/28/16 with protracted (70-day) postoperative course complicated by MDR infections, a C-diff infection, respiratory compromise due to plugging/PNA/effusions requiring multiple interventions and a trach, as well as a continued leak requiring a draining (double-pigtail) with stenting performed by GI. Patient had longstanding enterocutaneous fistula which she presents for resection of. Patient underwent a long prolonged hospital course including multiple SICU admissions and the following procedures. 7/24: SBR resection, fistula resection, revision of esophagogegunostomy drain through esophageal hiatus in the right mediastinum. 7/29: Ex lap, SB anastomosis in BP limb breakdown with succus throughout abdomen. 8/1: Abdominal washout, drainage of abscess, biologic mesh placement, closure of abdominal wall, vac and retention suture. 8/7: Abdominal washout, mesh removal frozen abdomen noted, LLQ CHECO placement with benson drain. 8/10: Leak noted from previous anastomosis site on left side, mid abdomen malecot drain placed in enterotomy, 2 JPs placed, necrotic fascia debrided, vicryl mesh sutured to fascia circumferentially, xeroform over mesh then vac dressing placed. 1/30: Ex-lap, resection of EC fistula, SBR x2 with patino hand sewn anastomosis, placement of 2 JPs (SQ) placement, primary abdominal closure with b/l external oblique release and prevena placement. Due to prolong hospital course the patient’s physically deconditioning has warranted her to receive physical therapy however, she was a minimal participant in her physical therapy sessions.  The patient also received psychological services over her hospital course and due to her mental and physical status we feel this makes her a good candidate for sub-acute rehab where she will continue to be monitored and recover. As of today, she is hemodynamically stable and is ready to be discharge to rehab. Patient will follow up with Dr. Brady in the outpatient setting for follow up care. 57 yo female w/PMHx of HTN, gastric bypass in 2002 complicated by stricture, corkscrewed sleeve and chronic leak, revised on 11/28/16 with protracted (70-day) postoperative course complicated by MDR infections, a C-diff infection, respiratory compromise due to plugging/PNA/effusions requiring multiple interventions and a trach, as well as a continued leak requiring a draining (double-pigtail) with stenting performed by GI. Patient had longstanding enterocutaneous fistula which she presents for resection of. Patient underwent a long prolonged hospital course including multiple SICU admissions and the following procedures. 7/24: SBR resection, fistula resection, revision of esophagogegunostomy drain through esophageal hiatus in the right mediastinum. 7/29: Ex lap, SB anastomosis in BP limb breakdown with succus throughout abdomen. 8/1: Abdominal washout, drainage of abscess, biologic mesh placement, closure of abdominal wall, vac and retention suture. 8/7: Abdominal washout, mesh removal frozen abdomen noted, LLQ CHECO placement with benson drain. 8/10: Leak noted from previous anastomosis site on left side, mid abdomen malecot drain placed in enterotomy, 2 JPs placed, necrotic fascia debrided, vicryl mesh sutured to fascia circumferentially, xeroform over mesh then vac dressing placed. 1/30: Ex-lap, resection of EC fistula, SBR x2 with patino hand sewn anastomosis, placement of 2 JPs (SQ) placement, primary abdominal closure with b/l external oblique release and prevena placement. Due to prolong hospital course the patient’s physically deconditioning has warranted her to receive physical therapy however, she was a minimal participant in her physical therapy sessions.  The patient also received psychological services over her hospital course and due to her mental and physical status we feel this makes her a good candidate for sub-acute rehab where she will continue to be monitored and recover. As of today, she is hemodynamically stable and is ready to be discharge to rehab. Patient will follow up with Dr. Brady in the outpatient setting for follow up care. 55 yo female w/PMHx of HTN, gastric bypass in 2002 complicated by stricture, corkscrewed sleeve and chronic leak, revised on 11/28/16 with protracted (70-day) postoperative course complicated by MDR infections, a C-diff infection, respiratory compromise due to plugging/PNA/effusions requiring multiple interventions and a trach, as well as a continued leak requiring a draining (double-pigtail) with stenting performed by GI. Patient had longstanding enterocutaneous fistula which she presented for resection of. She underwent a long prolonged hospital course including multiple SICU admissions and the following procedures. 7/24: SBR resection, fistula resection, revision of esophagogegunostomy drain through esophageal hiatus in the right mediastinum. 7/29: Ex lap, SB anastomosis in BP limb breakdown with succus throughout abdomen. 8/1: Abdominal washout, drainage of abscess, biologic mesh placement, closure of abdominal wall, vac and retention suture. 8/7: Abdominal washout, mesh removal frozen abdomen noted, LLQ CHECO placement with benson drain. 8/10: Leak noted from previous anastomosis site on left side, mid abdomen malecot drain placed in enterotomy, 2 JPs placed, necrotic fascia debrided, vicryl mesh sutured to fascia circumferentially, xeroform over mesh then vac dressing placed. 1/30: Ex-lap, resection of EC fistula, SBR x2 with patino hand sewn anastomosis, placement of 2 JPs (SQ) placement, primary abdominal closure with b/l external oblique release and prevena placement. Due to a prolong hospital course the patient’s physically deconditioning warranted her to receive physical therapy however, she was a minimal participant in her physical therapy sessions.  The patient also received psychological services over her hospital course and due to her mental and physical status we feel she is a good candidate for sub-acute rehab where she will continue to be monitored and recover. As of today, she is hemodynamically stable and is ready to be discharge to rehab. Patient will follow up with Dr. Brady in the outpatient setting for follow up care.

## 2018-02-18 LAB
ALBUMIN SERPL ELPH-MCNC: 2.3 G/DL — LOW (ref 3.3–5)
ALP SERPL-CCNC: 487 U/L — HIGH (ref 40–120)
ALT FLD-CCNC: 46 U/L — HIGH (ref 10–45)
ANION GAP SERPL CALC-SCNC: 14 MMOL/L — SIGNIFICANT CHANGE UP (ref 5–17)
AST SERPL-CCNC: 35 U/L — SIGNIFICANT CHANGE UP (ref 10–40)
BILIRUB SERPL-MCNC: 1 MG/DL — SIGNIFICANT CHANGE UP (ref 0.2–1.2)
BUN SERPL-MCNC: 9 MG/DL — SIGNIFICANT CHANGE UP (ref 7–23)
CALCIUM SERPL-MCNC: 9 MG/DL — SIGNIFICANT CHANGE UP (ref 8.4–10.5)
CHLORIDE SERPL-SCNC: 102 MMOL/L — SIGNIFICANT CHANGE UP (ref 96–108)
CO2 SERPL-SCNC: 22 MMOL/L — SIGNIFICANT CHANGE UP (ref 22–31)
CREAT SERPL-MCNC: 0.77 MG/DL — SIGNIFICANT CHANGE UP (ref 0.5–1.3)
CULTURE RESULTS: SIGNIFICANT CHANGE UP
GLUCOSE SERPL-MCNC: 91 MG/DL — SIGNIFICANT CHANGE UP (ref 70–99)
HCT VFR BLD CALC: 26.7 % — LOW (ref 34.5–45)
HGB BLD-MCNC: 7.8 G/DL — LOW (ref 11.5–15.5)
MAGNESIUM SERPL-MCNC: 2 MG/DL — SIGNIFICANT CHANGE UP (ref 1.6–2.6)
MCHC RBC-ENTMCNC: 27.9 PG — SIGNIFICANT CHANGE UP (ref 27–34)
MCHC RBC-ENTMCNC: 29.2 G/DL — LOW (ref 32–36)
MCV RBC AUTO: 95.4 FL — SIGNIFICANT CHANGE UP (ref 80–100)
PHOSPHATE SERPL-MCNC: 3.7 MG/DL — SIGNIFICANT CHANGE UP (ref 2.5–4.5)
PLATELET # BLD AUTO: 400 K/UL — SIGNIFICANT CHANGE UP (ref 150–400)
POTASSIUM SERPL-MCNC: 4.2 MMOL/L — SIGNIFICANT CHANGE UP (ref 3.5–5.3)
POTASSIUM SERPL-SCNC: 4.2 MMOL/L — SIGNIFICANT CHANGE UP (ref 3.5–5.3)
PROT SERPL-MCNC: 6.7 G/DL — SIGNIFICANT CHANGE UP (ref 6–8.3)
RBC # BLD: 2.8 M/UL — LOW (ref 3.8–5.2)
RBC # FLD: 17.9 % — HIGH (ref 10.3–16.9)
SODIUM SERPL-SCNC: 138 MMOL/L — SIGNIFICANT CHANGE UP (ref 135–145)
SPECIMEN SOURCE: SIGNIFICANT CHANGE UP
WBC # BLD: 7.3 K/UL — SIGNIFICANT CHANGE UP (ref 3.8–10.5)
WBC # FLD AUTO: 7.3 K/UL — SIGNIFICANT CHANGE UP (ref 3.8–10.5)

## 2018-02-18 RX ORDER — PANTOPRAZOLE SODIUM 20 MG/1
40 TABLET, DELAYED RELEASE ORAL ONCE
Qty: 0 | Refills: 0 | Status: COMPLETED | OUTPATIENT
Start: 2018-02-18 | End: 2018-02-18

## 2018-02-18 RX ORDER — ONDANSETRON 8 MG/1
4 TABLET, FILM COATED ORAL ONCE
Qty: 0 | Refills: 0 | Status: COMPLETED | OUTPATIENT
Start: 2018-02-18 | End: 2018-02-18

## 2018-02-18 RX ADMIN — Medication 12.5 MILLIGRAM(S): at 05:36

## 2018-02-18 RX ADMIN — ESCITALOPRAM OXALATE 20 MILLIGRAM(S): 10 TABLET, FILM COATED ORAL at 21:23

## 2018-02-18 RX ADMIN — Medication 5 MILLIGRAM(S): at 19:05

## 2018-02-18 RX ADMIN — Medication 5 MILLIGRAM(S): at 21:22

## 2018-02-18 RX ADMIN — ONDANSETRON 4 MILLIGRAM(S): 8 TABLET, FILM COATED ORAL at 16:18

## 2018-02-18 RX ADMIN — PREGABALIN 500 MICROGRAM(S): 225 CAPSULE ORAL at 12:16

## 2018-02-18 RX ADMIN — Medication 12.5 MILLIGRAM(S): at 19:05

## 2018-02-18 RX ADMIN — HEPARIN SODIUM 5000 UNIT(S): 5000 INJECTION INTRAVENOUS; SUBCUTANEOUS at 21:23

## 2018-02-18 RX ADMIN — Medication 100 MILLIGRAM(S): at 07:16

## 2018-02-18 RX ADMIN — LORATADINE 10 MILLIGRAM(S): 10 TABLET ORAL at 12:16

## 2018-02-18 RX ADMIN — Medication 5 MILLIGRAM(S): at 05:36

## 2018-02-18 RX ADMIN — Medication 1 TABLET(S): at 12:16

## 2018-02-18 RX ADMIN — PANTOPRAZOLE SODIUM 40 MILLIGRAM(S): 20 TABLET, DELAYED RELEASE ORAL at 05:35

## 2018-02-18 RX ADMIN — PANTOPRAZOLE SODIUM 40 MILLIGRAM(S): 20 TABLET, DELAYED RELEASE ORAL at 19:07

## 2018-02-18 RX ADMIN — Medication 100 MILLIGRAM(S): at 19:07

## 2018-02-18 RX ADMIN — HEPARIN SODIUM 5000 UNIT(S): 5000 INJECTION INTRAVENOUS; SUBCUTANEOUS at 05:35

## 2018-02-18 RX ADMIN — LOSARTAN POTASSIUM 50 MILLIGRAM(S): 100 TABLET, FILM COATED ORAL at 05:36

## 2018-02-18 RX ADMIN — HEPARIN SODIUM 5000 UNIT(S): 5000 INJECTION INTRAVENOUS; SUBCUTANEOUS at 14:51

## 2018-02-18 NOTE — PROGRESS NOTE ADULT - ASSESSMENT
57 F s/p  esophagojejunostomy revision c/b EC fistula now s/p medical optimization of EC fistula and return to OR for EC fistula take down and subsequent revision of her amair-en-y (1/30). Transferred back to SICU 2/5 for jaundice + bowel distension found to have functional biliary obstruction s/p perc cholecystostomy placement    Neuro: acetaminophen PRN, zofran prn, diazapam 5 qhs  CV: normotensive goals  Pulm: NC  FENGI: Regular bariatric phase 2 soft, PPI, stool softener,   : Voiding,   Endo: ISS  ID: discontinued zosyn (1/30-2/6), melany (1/30-2/6), Vanc (1/30-1/31) on levofloxacin for UTI (2/14-2/16), changed to nitrofurantoin (2/16-)  Ppx: SCDs, SQH  Lines: L IJ (1/.31-2/14)  Wounds: midline wound with dry gauze dressing. , cholecystostomy (2/6-)  Dispo planning: PT 57 F s/p  esophagojejunostomy revision c/b EC fistula now s/p medical optimization of EC fistula and return to OR for EC fistula take down and subsequent revision of her aamir-en-y (1/30). Transferred back to SICU 2/5 for jaundice + bowel distension found to have functional biliary obstruction s/p perc cholecystostomy placement    Pain and nausea control : acetaminophen PRN, zofran prn, diazapam 5 qhs  Diet: Regular bariatric phase 2 soft, PPI, stool softener,   ID: discontinued zosyn (1/30-2/6), melany (1/30-2/6), Vanc (1/30-1/31) on levofloxacin for UTI (2/14-2/16), changed to nitrofurantoin (2/16-)  DVT Ppx: SCDs, SQH  Wounds: midline wound with dry gauze dressing. , cholecystostomy tube (2/6-)  Dispo planning: [facility] on monday  AM labs

## 2018-02-18 NOTE — PROGRESS NOTE ADULT - SUBJECTIVE AND OBJECTIVE BOX
O/N: BRAYDEN, VSS  2/17: OOB/A. ambulating. VSS.BRAYDEN    POD: 1/30: Ex-lap, resection of EC fistula, SBRx2 with patino handsewn anastomosis, JPx2 (SQ) placement, primary abdominal closure with b/l external oblique release and prevena placement. O/N: BRAYDEN, VSS  2/17: OOB/A. ambulating. VSS.BRAYDEN    POD: 1/30: Ex-lap, resection of EC fistula, SBRx2 with patino handsewn anastomosis, JPx2 (SQ) placement, primary abdominal closure with b/l external oblique release and prevena placement.      SUBJECTIVE: Patient seen and examined bedside by chief resident. Ambulating, wanted more solid food, no nausea/vomiting, no abd pain    heparin  Injectable 5000 Unit(s) SubCutaneous every 8 hours  losartan 50 milliGRAM(s) Oral daily  metoprolol     tartrate 12.5 milliGRAM(s) Oral two times a day  metoprolol    tartrate Injectable 2.5 milliGRAM(s) IV Push every 6 hours PRN  nitrofurantoin (MACROBID) 100 milliGRAM(s) Oral two times a day with meals      Vital Signs Last 24 Hrs  T(C): 36.6 (18 Feb 2018 05:38), Max: 38.3 (17 Feb 2018 17:28)  T(F): 97.8 (18 Feb 2018 05:38), Max: 100.9 (17 Feb 2018 17:28)  HR: 85 (18 Feb 2018 05:38) (83 - 108)  BP: 135/80 (18 Feb 2018 05:38) (114/79 - 154/93)  BP(mean): --  RR: 16 (18 Feb 2018 05:38) (16 - 17)  SpO2: 93% (17 Feb 2018 20:01) (93% - 100%)  I&O's Detail    17 Feb 2018 07:01  -  18 Feb 2018 07:00  --------------------------------------------------------  IN:    Oral Fluid: 470 mL  Total IN: 470 mL    OUT:    Voided: 1 mL  Total OUT: 1 mL    Total NET: 469 mL          General: NAD, resting comfortably in bed  C/V: NSR  Pulm: Nonlabored breathing, no respiratory distress  Abd: soft, NT/ND. Incision D/C/I  Extrem: WWP, no edema, SCDs in place        LABS:                        7.8    7.3   )-----------( 400      ( 18 Feb 2018 06:50 )             26.7     02-18    138  |  102  |  9   ----------------------------<  91  4.2   |  22  |  0.77    Ca    9.0      18 Feb 2018 06:50  Phos  3.7     02-18  Mg     2.0     02-18    TPro  6.7  /  Alb  2.3<L>  /  TBili  1.0  /  DBili  x   /  AST  35  /  ALT  46<H>  /  AlkPhos  487<H>  02-18          RADIOLOGY & ADDITIONAL STUDIES:

## 2018-02-19 LAB
ALBUMIN SERPL ELPH-MCNC: 2.6 G/DL — LOW (ref 3.3–5)
ALP SERPL-CCNC: 446 U/L — HIGH (ref 40–120)
ALT FLD-CCNC: 38 U/L — SIGNIFICANT CHANGE UP (ref 10–45)
ANION GAP SERPL CALC-SCNC: 10 MMOL/L — SIGNIFICANT CHANGE UP (ref 5–17)
AST SERPL-CCNC: 27 U/L — SIGNIFICANT CHANGE UP (ref 10–40)
BILIRUB SERPL-MCNC: 0.9 MG/DL — SIGNIFICANT CHANGE UP (ref 0.2–1.2)
BUN SERPL-MCNC: 10 MG/DL — SIGNIFICANT CHANGE UP (ref 7–23)
CALCIUM SERPL-MCNC: 9.3 MG/DL — SIGNIFICANT CHANGE UP (ref 8.4–10.5)
CHLORIDE SERPL-SCNC: 103 MMOL/L — SIGNIFICANT CHANGE UP (ref 96–108)
CO2 SERPL-SCNC: 27 MMOL/L — SIGNIFICANT CHANGE UP (ref 22–31)
CREAT SERPL-MCNC: 0.82 MG/DL — SIGNIFICANT CHANGE UP (ref 0.5–1.3)
GLUCOSE SERPL-MCNC: 87 MG/DL — SIGNIFICANT CHANGE UP (ref 70–99)
HCT VFR BLD CALC: 27 % — LOW (ref 34.5–45)
HGB BLD-MCNC: 7.9 G/DL — LOW (ref 11.5–15.5)
MAGNESIUM SERPL-MCNC: 2 MG/DL — SIGNIFICANT CHANGE UP (ref 1.6–2.6)
MCHC RBC-ENTMCNC: 28 PG — SIGNIFICANT CHANGE UP (ref 27–34)
MCHC RBC-ENTMCNC: 29.3 G/DL — LOW (ref 32–36)
MCV RBC AUTO: 95.7 FL — SIGNIFICANT CHANGE UP (ref 80–100)
PHOSPHATE SERPL-MCNC: 3.1 MG/DL — SIGNIFICANT CHANGE UP (ref 2.5–4.5)
PLATELET # BLD AUTO: 411 K/UL — HIGH (ref 150–400)
POTASSIUM SERPL-MCNC: 4.1 MMOL/L — SIGNIFICANT CHANGE UP (ref 3.5–5.3)
POTASSIUM SERPL-SCNC: 4.1 MMOL/L — SIGNIFICANT CHANGE UP (ref 3.5–5.3)
PROT SERPL-MCNC: 6.5 G/DL — SIGNIFICANT CHANGE UP (ref 6–8.3)
RBC # BLD: 2.82 M/UL — LOW (ref 3.8–5.2)
RBC # FLD: 17.7 % — HIGH (ref 10.3–16.9)
SODIUM SERPL-SCNC: 140 MMOL/L — SIGNIFICANT CHANGE UP (ref 135–145)
WBC # BLD: 7.7 K/UL — SIGNIFICANT CHANGE UP (ref 3.8–10.5)
WBC # FLD AUTO: 7.7 K/UL — SIGNIFICANT CHANGE UP (ref 3.8–10.5)

## 2018-02-19 RX ORDER — ONDANSETRON 8 MG/1
4 TABLET, FILM COATED ORAL ONCE
Qty: 0 | Refills: 0 | Status: COMPLETED | OUTPATIENT
Start: 2018-02-19 | End: 2018-02-19

## 2018-02-19 RX ADMIN — HEPARIN SODIUM 5000 UNIT(S): 5000 INJECTION INTRAVENOUS; SUBCUTANEOUS at 21:51

## 2018-02-19 RX ADMIN — Medication 5 MILLIGRAM(S): at 21:51

## 2018-02-19 RX ADMIN — ONDANSETRON 4 MILLIGRAM(S): 8 TABLET, FILM COATED ORAL at 13:27

## 2018-02-19 RX ADMIN — Medication 5 MILLIGRAM(S): at 05:18

## 2018-02-19 RX ADMIN — Medication 1 TABLET(S): at 18:26

## 2018-02-19 RX ADMIN — LORATADINE 10 MILLIGRAM(S): 10 TABLET ORAL at 18:26

## 2018-02-19 RX ADMIN — Medication 100 MILLIGRAM(S): at 18:26

## 2018-02-19 RX ADMIN — HEPARIN SODIUM 5000 UNIT(S): 5000 INJECTION INTRAVENOUS; SUBCUTANEOUS at 05:18

## 2018-02-19 RX ADMIN — PANTOPRAZOLE SODIUM 40 MILLIGRAM(S): 20 TABLET, DELAYED RELEASE ORAL at 18:26

## 2018-02-19 RX ADMIN — Medication 5 MILLIGRAM(S): at 20:37

## 2018-02-19 RX ADMIN — LOSARTAN POTASSIUM 50 MILLIGRAM(S): 100 TABLET, FILM COATED ORAL at 05:19

## 2018-02-19 RX ADMIN — Medication 12.5 MILLIGRAM(S): at 18:26

## 2018-02-19 RX ADMIN — PANTOPRAZOLE SODIUM 40 MILLIGRAM(S): 20 TABLET, DELAYED RELEASE ORAL at 05:19

## 2018-02-19 RX ADMIN — Medication 12.5 MILLIGRAM(S): at 05:18

## 2018-02-19 RX ADMIN — ESCITALOPRAM OXALATE 20 MILLIGRAM(S): 10 TABLET, FILM COATED ORAL at 18:26

## 2018-02-19 RX ADMIN — PREGABALIN 500 MICROGRAM(S): 225 CAPSULE ORAL at 18:26

## 2018-02-19 RX ADMIN — HEPARIN SODIUM 5000 UNIT(S): 5000 INJECTION INTRAVENOUS; SUBCUTANEOUS at 14:15

## 2018-02-19 RX ADMIN — Medication 100 MILLIGRAM(S): at 08:05

## 2018-02-19 NOTE — PROGRESS NOTE ADULT - SUBJECTIVE AND OBJECTIVE BOX
Azucena is ready for placement  If she was independent and worked could go home  but presently still bed wetting with minimal effort making bed transfers  I have had long conversations with her  and with her   is incredibly helpful and hopefully she will develop some expectations of herself  i have spoken to staff and suggested that can eat out of bed and cannot refuse physical therapy  medically afeb vss moving bowels  cholecystostomy tube is clamped and bili is normal  alk phos high but that is probably secondary to the long term tpn  can have regular high protein diet  wbc and albumin are normal  all lines besides the steven tube are out   she is on macrobid and we have stopped all meds that are not essential  Overall her medical prognosis is fine if she would begin to perform basic activities  she loves being in bed and seeks meds to help her sleep  I have discussed getting her into clothes  hair done and trying to introduce her into wanting more  Psych has been involved and to some extent believe that has sub conscious fear of moving to independent life

## 2018-02-19 NOTE — PROGRESS NOTE ADULT - ASSESSMENT
57 F s/p  esophagojejunostomy revision c/b EC fistula now s/p medical optimization of EC fistula and return to OR for EC fistula take down and subsequent revision of her aamir-en-y (1/30). Transferred back to SICU 2/5 for jaundice + bowel distension found to have functional biliary obstruction s/p perc cholecystostomy placement    Neuro: acetaminophen PRN, zofran prn, diazapam 5 qhs  CV: normotensive goals  Pulm: NC  FENGI: Regular bariatric phase 3 soft, PPI, stool softener,   : Voiding,   Endo: ISS  ID: discontinued zosyn (1/30-2/6), melany (1/30-2/6), Vanc (1/30-1/31) on levofloxacin for UTI (2/14-2/16), changed to nitrofurantoin (2/16-)  Ppx: SCDs, SQH  Lines: L IJ (1/.31-2/14)  Wounds: midline wound with dry gauze dressing. , cholecystostomy (2/6-) 57 F s/p  esophagojejunostomy revision c/b EC fistula now s/p medical optimization of EC fistula and return to OR for EC fistula take down and subsequent revision of her aamir-en-y (1/30). Transferred back to SICU 2/5 for jaundice + bowel distension found to have functional biliary obstruction s/p perc cholecystostomy placement    Neuro: acetaminophen PRN, zofran prn, diazapam 5 qhs  CV: normotensive goals  Pulm: NC  FENGI: Regular bariatric phase 3 soft, PPI, stool softener,   : Voiding,   Endo: ISS  ID: discontinued zosyn (1/30-2/6), melany (1/30-2/6), Vanc (1/30-1/31) on levofloxacin for UTI (2/14-2/16), changed to nitrofurantoin (2/16-)  Ppx: SCDs, SQH  Lines: L IJ (1/.31-2/14)  Wounds: midline wound with dry gauze dressing. , cholecystostomy (2/6-)    Dispo planning --> [facility]

## 2018-02-19 NOTE — PROGRESS NOTE ADULT - SUBJECTIVE AND OBJECTIVE BOX
O/N: OOB/TC. VSS. BRAYDEN  2/18 : keep the cholecystostomy tube for another 4-5 weeks as per IR resident (will confirm with William tomorrow). Advance diet to bariatric phase 3 O/N: OOB/TC. VSS. BRAYDEN  2/18 : keep the cholecystostomy tube for another 4-5 weeks as per IR resident (will confirm with William tomorrow). Advance diet to bariatric phase 3      Vital Signs Last 24 Hrs  T(C): 36.9 (19 Feb 2018 05:09), Max: 37.5 (18 Feb 2018 09:00)  T(F): 98.4 (19 Feb 2018 05:09), Max: 99.5 (18 Feb 2018 09:00)  HR: 94 (19 Feb 2018 05:09) (78 - 101)  BP: 132/84 (19 Feb 2018 05:09) (118/83 - 144/88)  BP(mean): --  RR: 16 (19 Feb 2018 05:09) (16 - 17)  SpO2: 93% (19 Feb 2018 05:09) (93% - 97%)      I&O's Summary    17 Feb 2018 07:01  -  18 Feb 2018 07:00  --------------------------------------------------------  IN: 470 mL / OUT: 1 mL / NET: 469 mL    18 Feb 2018 07:01  -  19 Feb 2018 06:17  --------------------------------------------------------  IN: 300 mL / OUT: 0 mL / NET: 300 mL        Gen: NAD   Abd: soft, nt / nd   perc steven in place

## 2018-02-19 NOTE — PROGRESS NOTE ADULT - PROVIDER SPECIALTY LIST ADULT
Bariatric Surgery I will START or STAY ON the medications listed below when I get home from the hospital:    acetaminophen 325 mg oral tablet  -- 2 tab(s) by mouth every 6 hours, As needed, Mild Pain  -- Indication: For .    divalproex sodium 250 mg oral delayed release tablet  -- 1 tab(s) by mouth 2 times a day  -- Indication: For .    traZODone 100 mg oral tablet  -- Indication: For .    sertraline 100 mg oral tablet  -- 1 tab(s) by mouth once a day  -- Indication: For .    doxycycline monohydrate 100 mg oral capsule  -- 1 cap(s) by mouth every 12 hours  -- Indication: For .    QUEtiapine 50 mg oral tablet  -- 1 tab(s) by mouth 3 times a day  -- Indication: For .    amLODIPine 5 mg oral tablet  -- 1 tab(s) by mouth once a day  -- Indication: For .    pancrelipase 12,000 units-38,000 units-60,000 units oral delayed release capsule  -- 2 cap(s) by mouth 3 times a day (with meals)  -- Indication: For .    senna oral tablet  -- 2 tab(s) by mouth once a day (at bedtime), As needed, Constipation  -- Indication: For .    docusate sodium 100 mg oral capsule  -- 1 cap(s) by mouth 3 times a day  -- Indication: For ..    melatonin 10 mg oral capsule  -- 1 cap(s) by mouth once a day (at bedtime)  -- Indication: For .    Pred Forte 1% ophthalmic suspension  -- 1 dose(s) to Left eye once a day (at bedtime)  -- Indication: For .    pantoprazole 40 mg oral delayed release tablet  -- 1 tab(s) by mouth once a day (before a meal)  -- Indication: For .    Myrbetriq 50 mg oral tablet, extended release  -- 1 tab(s) by mouth once a day (at bedtime)  -- Indication: For . I will START or STAY ON the medications listed below when I get home from the hospital:    acetaminophen 325 mg oral tablet  -- 2 tab(s) by mouth every 6 hours, As needed, Mild Pain  -- Indication: For . pain /  fevers    divalproex sodium 250 mg oral delayed release tablet  -- 1 tab(s) by mouth 2 times a day  -- Indication: For Agitation    traZODone 100 mg oral tablet  -- Indication: For Agitation    sertraline 100 mg oral tablet  -- 1 tab(s) by mouth once a day  -- Indication: For Agitation    doxycycline monohydrate 100 mg oral capsule  -- 1 cap(s) by mouth every 12 hours  -- Indication: For ? Neuro lyme    QUEtiapine 50 mg oral tablet  -- 1 tab(s) by mouth 3 times a day  -- Indication: For Aggitation    amLODIPine 5 mg oral tablet  -- 1 tab(s) by mouth once a day  -- Indication: For HTN    pancrelipase 12,000 units-38,000 units-60,000 units oral delayed release capsule  -- 2 cap(s) by mouth 3 times a day (with meals)  -- Indication: For -     senna oral tablet  -- 2 tab(s) by mouth once a day (at bedtime), As needed, Constipation  -- Indication: For constipation    docusate sodium 100 mg oral capsule  -- 1 cap(s) by mouth 3 times a day  -- Indication: For constipation    melatonin 10 mg oral capsule  -- 1 cap(s) by mouth once a day (at bedtime)  -- Indication: For insomnia    Pred Forte 1% ophthalmic suspension  -- 1 dose(s) to Left eye once a day (at bedtime)  -- Indication: For .    pantoprazole 40 mg oral delayed release tablet  -- 1 tab(s) by mouth once a day (before a meal)  -- Indication: For GERD    Myrbetriq 50 mg oral tablet, extended release  -- 1 tab(s) by mouth once a day (at bedtime)  -- Indication: For  - I will START or STAY ON the medications listed below when I get home from the hospital:    acetaminophen 325 mg oral tablet  -- 2 tab(s) by mouth every 6 hours, As needed, Mild Pain  -- Indication: For . pain /  fevers    divalproex sodium 250 mg oral delayed release tablet  -- 1 tab(s) by mouth 2 times a day  -- Indication: For Agitation    traZODone 100 mg oral tablet  -- Indication: For Agitation    sertraline 100 mg oral tablet  -- 1 tab(s) by mouth once a day  -- Indication: For Agitation    doxycycline monohydrate 100 mg oral capsule  -- 1 cap(s) by mouth every 12 hours  -- Indication: For ? Neuro lyme    QUEtiapine 50 mg oral tablet  -- 1 tab(s) by mouth 3 times a day  -- Indication: For Aggitation    amLODIPine 5 mg oral tablet  -- 1 tab(s) by mouth once a day  -- Indication: For HTN    pancrelipase 12,000 units-38,000 units-60,000 units oral delayed release capsule  -- 2 cap(s) by mouth 3 times a day (with meals)  -- Indication: For -     senna oral tablet  -- 2 tab(s) by mouth once a day (at bedtime), As needed, Constipation  -- Indication: For constipation - OTC    docusate sodium 100 mg oral capsule  -- 1 cap(s) by mouth 3 times a day  -- Indication: For constipation - OTC    MiraLax oral powder for reconstitution  -- 17 gram(s) by mouth 2 times a day   -- Indication: For constipation - OTC    melatonin 10 mg oral capsule  -- 1 cap(s) by mouth once a day (at bedtime)  -- Indication: For insomnia    Pred Forte 1% ophthalmic suspension  -- 1 dose(s) to Left eye once a day (at bedtime)  -- Indication: For .    pantoprazole 40 mg oral delayed release tablet  -- 1 tab(s) by mouth once a day (before a meal)  -- Indication: For GERD    Myrbetriq 50 mg oral tablet, extended release  -- 1 tab(s) by mouth once a day (at bedtime)  -- Indication: For  - I will START or STAY ON the medications listed below when I get home from the hospital:    acetaminophen 325 mg oral tablet  -- 2 tab(s) by mouth every 6 hours, As needed, Mild Pain  -- Indication: For . pain /  fevers    divalproex sodium 250 mg oral delayed release tablet  -- 1 tab(s) by mouth 2 times a day  -- Indication: For seizure    traZODone 100 mg oral tablet  -- Indication: For Agitation    sertraline 100 mg oral tablet  -- 1 tab(s) by mouth once a day  -- Indication: For Agitation    doxycycline monohydrate 100 mg oral capsule  -- 1 cap(s) by mouth every 12 hours  -- Indication: For Antibiotic    QUEtiapine 50 mg oral tablet  -- 1 tab(s) by mouth 3 times a day  -- Indication: For Aggitation    amLODIPine 5 mg oral tablet  -- 1 tab(s) by mouth once a day  -- Indication: For HTN    pancrelipase 12,000 units-38,000 units-60,000 units oral delayed release capsule  -- 2 cap(s) by mouth 3 times a day (with meals)  -- Indication: For -     senna oral tablet  -- 2 tab(s) by mouth once a day (at bedtime), As needed, Constipation  -- Indication: For constipation - OTC    docusate sodium 100 mg oral capsule  -- 1 cap(s) by mouth 3 times a day  -- Indication: For constipation - OTC    MiraLax oral powder for reconstitution  -- 17 gram(s) by mouth 2 times a day   -- Indication: For constipation - OTC    melatonin 10 mg oral capsule  -- 1 cap(s) by mouth once a day (at bedtime)  -- Indication: For insomnia    Pred Forte 1% ophthalmic suspension  -- 1 dose(s) to Left eye once a day (at bedtime)  -- Indication: For .    pantoprazole 40 mg oral delayed release tablet  -- 1 tab(s) by mouth once a day (before a meal)  -- Indication: For GERD    Myrbetriq 50 mg oral tablet, extended release  -- 1 tab(s) by mouth once a day (at bedtime)  -- Indication: For  -

## 2018-02-20 LAB
ANION GAP SERPL CALC-SCNC: 11 MMOL/L — SIGNIFICANT CHANGE UP (ref 5–17)
BUN SERPL-MCNC: 11 MG/DL — SIGNIFICANT CHANGE UP (ref 7–23)
CALCIUM SERPL-MCNC: 8.8 MG/DL — SIGNIFICANT CHANGE UP (ref 8.4–10.5)
CHLORIDE SERPL-SCNC: 101 MMOL/L — SIGNIFICANT CHANGE UP (ref 96–108)
CO2 SERPL-SCNC: 24 MMOL/L — SIGNIFICANT CHANGE UP (ref 22–31)
CREAT SERPL-MCNC: 0.84 MG/DL — SIGNIFICANT CHANGE UP (ref 0.5–1.3)
GLUCOSE SERPL-MCNC: 93 MG/DL — SIGNIFICANT CHANGE UP (ref 70–99)
HCT VFR BLD CALC: 25.2 % — LOW (ref 34.5–45)
HGB BLD-MCNC: 7.6 G/DL — LOW (ref 11.5–15.5)
MAGNESIUM SERPL-MCNC: 1.9 MG/DL — SIGNIFICANT CHANGE UP (ref 1.6–2.6)
MCHC RBC-ENTMCNC: 28.1 PG — SIGNIFICANT CHANGE UP (ref 27–34)
MCHC RBC-ENTMCNC: 30.2 G/DL — LOW (ref 32–36)
MCV RBC AUTO: 93.3 FL — SIGNIFICANT CHANGE UP (ref 80–100)
PHOSPHATE SERPL-MCNC: 3 MG/DL — SIGNIFICANT CHANGE UP (ref 2.5–4.5)
PLATELET # BLD AUTO: 412 K/UL — HIGH (ref 150–400)
POTASSIUM SERPL-MCNC: 3.8 MMOL/L — SIGNIFICANT CHANGE UP (ref 3.5–5.3)
POTASSIUM SERPL-SCNC: 3.8 MMOL/L — SIGNIFICANT CHANGE UP (ref 3.5–5.3)
RBC # BLD: 2.7 M/UL — LOW (ref 3.8–5.2)
RBC # FLD: 17.8 % — HIGH (ref 10.3–16.9)
SODIUM SERPL-SCNC: 136 MMOL/L — SIGNIFICANT CHANGE UP (ref 135–145)
WBC # BLD: 7.6 K/UL — SIGNIFICANT CHANGE UP (ref 3.8–10.5)
WBC # FLD AUTO: 7.6 K/UL — SIGNIFICANT CHANGE UP (ref 3.8–10.5)

## 2018-02-20 RX ADMIN — Medication 5 MILLIGRAM(S): at 17:16

## 2018-02-20 RX ADMIN — Medication 12.5 MILLIGRAM(S): at 17:16

## 2018-02-20 RX ADMIN — HEPARIN SODIUM 5000 UNIT(S): 5000 INJECTION INTRAVENOUS; SUBCUTANEOUS at 21:44

## 2018-02-20 RX ADMIN — LOSARTAN POTASSIUM 50 MILLIGRAM(S): 100 TABLET, FILM COATED ORAL at 05:16

## 2018-02-20 RX ADMIN — Medication 1 TABLET(S): at 11:21

## 2018-02-20 RX ADMIN — HEPARIN SODIUM 5000 UNIT(S): 5000 INJECTION INTRAVENOUS; SUBCUTANEOUS at 05:16

## 2018-02-20 RX ADMIN — PANTOPRAZOLE SODIUM 40 MILLIGRAM(S): 20 TABLET, DELAYED RELEASE ORAL at 05:22

## 2018-02-20 RX ADMIN — Medication 5 MILLIGRAM(S): at 05:16

## 2018-02-20 RX ADMIN — PANTOPRAZOLE SODIUM 40 MILLIGRAM(S): 20 TABLET, DELAYED RELEASE ORAL at 17:16

## 2018-02-20 RX ADMIN — Medication 100 MILLIGRAM(S): at 07:53

## 2018-02-20 RX ADMIN — ESCITALOPRAM OXALATE 20 MILLIGRAM(S): 10 TABLET, FILM COATED ORAL at 11:21

## 2018-02-20 RX ADMIN — LORATADINE 10 MILLIGRAM(S): 10 TABLET ORAL at 11:21

## 2018-02-20 RX ADMIN — PREGABALIN 500 MICROGRAM(S): 225 CAPSULE ORAL at 11:21

## 2018-02-20 RX ADMIN — Medication 100 MILLIGRAM(S): at 17:17

## 2018-02-20 RX ADMIN — Medication 5 MILLIGRAM(S): at 21:44

## 2018-02-20 RX ADMIN — Medication 12.5 MILLIGRAM(S): at 05:16

## 2018-02-20 NOTE — PROGRESS NOTE ADULT - SUBJECTIVE AND OBJECTIVE BOX
Believe is ready for placement for sub acute rehabilitation  continue to encourage being out of bed  will remove skin sutures prior to discharge  3 weeks post op today

## 2018-02-20 NOTE — PROGRESS NOTE ADULT - SUBJECTIVE AND OBJECTIVE BOX
O/N: VSS. BRAYDEN  2/19: OOB, BRAYDEN O/N: VSS. BRAYDEN  2/19: OOB, BRAYDEN       Vital Signs Last 24 Hrs  T(C): 37.3 (20 Feb 2018 05:58), Max: 37.3 (20 Feb 2018 05:58)  T(F): 99.1 (20 Feb 2018 05:58), Max: 99.1 (20 Feb 2018 05:58)  HR: 85 (20 Feb 2018 05:58) (77 - 88)  BP: 118/79 (20 Feb 2018 05:58) (114/78 - 146/82)  BP(mean): --  RR: 16 (20 Feb 2018 05:58) (16 - 17)  SpO2: 93% (20 Feb 2018 05:58) (93% - 99%)    Gen: NAD   Abd: soft, nt / nd   incision c/d/i

## 2018-02-20 NOTE — PROGRESS NOTE ADULT - ASSESSMENT
57 F s/p  esophagojejunostomy revision c/b EC fistula now s/p medical optimization of EC fistula and return to OR for EC fistula take down and subsequent revision of her aamir-en-y (1/30). Transferred back to SICU 2/5 for jaundice + bowel distension found to have functional biliary obstruction s/p perc cholecystostomy placement    Neuro: acetaminophen PRN, zofran prn, diazapam 5 qhs  CV: normotensive goals  Pulm: NC  FENGI: Regular bariatric phase 3 soft, PPI, stool softener,   : Voiding,   Endo: ISS  ID: discontinued zosyn (1/30-2/6), melany (1/30-2/6), Vanc (1/30-1/31) on levofloxacin for UTI (2/14-2/16), changed to nitrofurantoin (2/16-)  Ppx: SCDs, SQH  Lines: L IJ (1/.31-2/14)  Wounds: midline wound with dry gauze dressing. , cholecystostomy (2/6-)  Dispo planning: PT

## 2018-02-20 NOTE — PROGRESS NOTE ADULT - SUBJECTIVE AND OBJECTIVE BOX
Physical Medicine and Rehabilitation Progress Note    AISSATOU DAVIS    MRN-[id number]    Patient is a 57y old  Female who presents with a chief complaint of Patient had longstanding enterocutaneous fistula which she presents for resection (17 Feb 2018 01:06)      Vital Signs Last 24 Hrs  T(C): 37.3 (20 Feb 2018 05:58), Max: 37.3 (20 Feb 2018 05:58)  T(F): 99.1 (20 Feb 2018 05:58), Max: 99.1 (20 Feb 2018 05:58)  HR: 85 (20 Feb 2018 05:58) (77 - 88)  BP: 118/79 (20 Feb 2018 05:58) (114/78 - 146/82)  BP(mean): --  RR: 16 (20 Feb 2018 05:58) (16 - 17)  SpO2: 93% (20 Feb 2018 05:58) (93% - 99%)    Current Functional Status in Physical Therapy: lying in bed comfortably. Alert and stable.  Obese and deconditioned. On bed side PT    Bed Mobility:  moderate assistance    Transfers:  minimal assistance with rolling walker    Ambulation: contact guarding with  rolling walker as well as maximal encourage ment      Impression:  1. Deconditioned  2. Obesity  3. s/p multiple abdominal surgeries      Recommendations:  1. Contineu PT for therapeutic ex.  , bed mobility and gait training with device  3.  Subacute rehab.  placement

## 2018-02-21 RX ORDER — LIDOCAINE HCL 20 MG/ML
30 VIAL (ML) INJECTION ONCE
Qty: 0 | Refills: 0 | Status: COMPLETED | OUTPATIENT
Start: 2018-02-21 | End: 2018-02-21

## 2018-02-21 RX ORDER — MIRTAZAPINE 45 MG/1
7.5 TABLET, ORALLY DISINTEGRATING ORAL AT BEDTIME
Qty: 0 | Refills: 0 | Status: DISCONTINUED | OUTPATIENT
Start: 2018-02-21 | End: 2018-02-23

## 2018-02-21 RX ORDER — LIDOCAINE HCL 20 MG/ML
30 VIAL (ML) INJECTION ONCE
Qty: 0 | Refills: 0 | Status: DISCONTINUED | OUTPATIENT
Start: 2018-02-21 | End: 2018-02-21

## 2018-02-21 RX ADMIN — HEPARIN SODIUM 5000 UNIT(S): 5000 INJECTION INTRAVENOUS; SUBCUTANEOUS at 05:22

## 2018-02-21 RX ADMIN — Medication 12.5 MILLIGRAM(S): at 05:22

## 2018-02-21 RX ADMIN — Medication 12.5 MILLIGRAM(S): at 17:54

## 2018-02-21 RX ADMIN — Medication 1 TABLET(S): at 11:52

## 2018-02-21 RX ADMIN — LORATADINE 10 MILLIGRAM(S): 10 TABLET ORAL at 11:52

## 2018-02-21 RX ADMIN — MIRTAZAPINE 7.5 MILLIGRAM(S): 45 TABLET, ORALLY DISINTEGRATING ORAL at 22:35

## 2018-02-21 RX ADMIN — ESCITALOPRAM OXALATE 20 MILLIGRAM(S): 10 TABLET, FILM COATED ORAL at 11:53

## 2018-02-21 RX ADMIN — PANTOPRAZOLE SODIUM 40 MILLIGRAM(S): 20 TABLET, DELAYED RELEASE ORAL at 17:54

## 2018-02-21 RX ADMIN — Medication 100 MILLIGRAM(S): at 08:13

## 2018-02-21 RX ADMIN — HEPARIN SODIUM 5000 UNIT(S): 5000 INJECTION INTRAVENOUS; SUBCUTANEOUS at 22:34

## 2018-02-21 RX ADMIN — Medication 5 MILLIGRAM(S): at 05:22

## 2018-02-21 RX ADMIN — Medication 5 MILLIGRAM(S): at 17:54

## 2018-02-21 RX ADMIN — Medication 100 MILLIGRAM(S): at 17:54

## 2018-02-21 RX ADMIN — Medication 30 MILLILITER(S): at 12:00

## 2018-02-21 RX ADMIN — PANTOPRAZOLE SODIUM 40 MILLIGRAM(S): 20 TABLET, DELAYED RELEASE ORAL at 05:22

## 2018-02-21 RX ADMIN — Medication 5 MILLIGRAM(S): at 22:35

## 2018-02-21 RX ADMIN — LOSARTAN POTASSIUM 50 MILLIGRAM(S): 100 TABLET, FILM COATED ORAL at 05:22

## 2018-02-21 RX ADMIN — PREGABALIN 500 MICROGRAM(S): 225 CAPSULE ORAL at 11:52

## 2018-02-21 NOTE — PROGRESS NOTE ADULT - SUBJECTIVE AND OBJECTIVE BOX
andrzej is stable and awaiting placement  will remove sutures prior to discharge  continue PT explained that cannot eat in bed must be in a chair  keep cholecystostomy tube in place  overall stable

## 2018-02-21 NOTE — CHART NOTE - NSCHARTNOTESELECT_GEN_ALL_CORE
Addendum/Off Service Note
Central Line Insertion Note
Event Note
Event Note
Nutrition Follow Up/Nutrition Services
Nutrition Follow Up/Nutrition Services
Nutrition Services
Nutrition Services
Nutrition Services/Nutrition Follow Up
Nutrition Services/Nutrition follow-up
Nutrition Services/nutrition f/u
Off Service Note/CT Surgery
Off Service Note/CTS
Surgery PA/Event Note
Event Note
Follow Up/Nutrition Services
Nutrition Follow Up/Nutrition Services
Nutrition Services
Nutrition Services/Nutrition Follow Up
Nutrition follow-up/Nutrition Services

## 2018-02-21 NOTE — PROGRESS NOTE ADULT - SUBJECTIVE AND OBJECTIVE BOX
O/N: VSS. BRAYDEN  2/20: awaiting MICHELE placement OVERNIGHT EVENTS: VSS. BRAYDEN  2/20: awaiting MICHELE placement     SUBJECTIVE: Patient examined bedside denies any complaints   Flatus: [] YES [X] NO             Bowel Movement: [ ] YES [ X] NO  Pain (0-10):            Pain Control Adequate: [X ] YES [ ] NO  Nausea: [ ] YES [X ] NO            Vomiting: [ ] YES [ X] NO  Diarrhea: [ ] YES [X ] NO         Constipation: [ ] YES [ X] NO     Chest Pain: [ ] YES [X ] NO    SOB:  [ ] YES [X ] NO    heparin  Injectable 5000 Unit(s) SubCutaneous every 8 hours  losartan 50 milliGRAM(s) Oral daily  metoprolol     tartrate 12.5 milliGRAM(s) Oral two times a day  metoprolol    tartrate Injectable 2.5 milliGRAM(s) IV Push every 6 hours PRN  nitrofurantoin (MACROBID) 100 milliGRAM(s) Oral two times a day with meals      Vital Signs Last 24 Hrs  T(C): 37.3 (21 Feb 2018 05:20), Max: 37.5 (20 Feb 2018 13:27)  T(F): 99.1 (21 Feb 2018 05:20), Max: 99.5 (20 Feb 2018 13:27)  HR: 91 (21 Feb 2018 05:20) (83 - 96)  BP: 150/91 (21 Feb 2018 05:20) (126/82 - 150/91)  BP(mean): --  RR: 17 (21 Feb 2018 05:20) (16 - 17)  SpO2: 94% (21 Feb 2018 05:20) (94% - 95%)  I&O's Detail    20 Feb 2018 07:01  -  21 Feb 2018 07:00  --------------------------------------------------------  IN:    Oral Fluid: 360 mL  Total IN: 360 mL    OUT:  Total OUT: 0 mL    Total NET: 360 mL          General: NAD, resting comfortably in bed  C/V: NSR  Pulm: Nonlabored breathing, no respiratory distress  Abd: soft, NT/ND.  Extrem: WWP, no edema, SCDs in place        LABS:                        7.6    7.6   )-----------( 412      ( 20 Feb 2018 06:10 )             25.2     02-20    136  |  101  |  11  ----------------------------<  93  3.8   |  24  |  0.84    Ca    8.8      20 Feb 2018 06:10  Phos  3.0     02-20  Mg     1.9     02-20            RADIOLOGY & ADDITIONAL STUDIES:

## 2018-02-21 NOTE — CHART NOTE - NSCHARTNOTEFT_GEN_A_CORE
Admitting Diagnosis:   Patient is a 57y old  Female who presents with a chief complaint of Patient had longstanding enterocutaneous fistula which she presents for resection (17 Feb 2018 01:06)      PAST MEDICAL & SURGICAL HISTORY:  Reflux  Obesity  DVT (deep venous thrombosis): 2013 neck  Diverticulitis  Hypertension  Elective surgery: abodominal wall surgery  dec 2016  Elective surgery: NOV 2016 gastric bypass revision  Gastric bypass status for obesity: gastric sleeve, 6/2012  S/P breast biopsy: 2011, left  S/P colon resection: 2011  S/P knee surgery: repair 2009, 2011  right; left  Umbilical hernia: 2000  S/P appendectomy: 1975  S/P tonsillectomy: 1967    Current Nutrition Order: Regular    PO Intake: Good (%) [   ]  Fair (50-75%) [   ] Poor (<25%) [ x  ]  Per RN - Pt had cereal this morning and tolerated well.  Calorie count performed 2/15-2/17 completed and findings were communicated with team 2/18. See results below.   Pt continues to require encouragement and support during meal times.   Per team- pt is to have 5 small high protein meals/day. This can be done by saving a protein option (i.e., greek yogurt, or apple sauce w/ prostat) for between meals).    GI Issues:   Per RN no c/o nausea/vomiting/diarrhea/constipation at present.  RUQ perc steven drain in place draining bilious fluid.    Pain: Pain being medically managed. Pain controlled.    Skin Integrity: surgical sites from s/p repair and closure of entercutaneous fistula. cholecystostomy. Jigar score of 19. Stage II pressure ulcer.      Labs:   02-20    136  |  101  |  11  ----------------------------<  93  3.8   |  24  |  0.84    Ca    8.8      20 Feb 2018 06:10  Phos  3.0     02-20  Mg     1.9     02-20      Medications:  MEDICATIONS  (STANDING):  bisacodyl 5 milliGRAM(s) Oral two times a day  calcium carbonate 1250 mG (OsCal) 1 Tablet(s) Oral daily  cyanocobalamin 500 MICROGram(s) Oral daily  diazepam    Tablet 5 milliGRAM(s) Oral at bedtime  escitalopram 20 milliGRAM(s) Oral daily  heparin  Injectable 5000 Unit(s) SubCutaneous every 8 hours  lidocaine 1% (Preservative-free) Injectable 30 milliLiter(s) Local Injection once  loratadine 10 milliGRAM(s) Oral daily  losartan 50 milliGRAM(s) Oral daily  metoprolol     tartrate 12.5 milliGRAM(s) Oral two times a day  multivitamin 1 Tablet(s) Oral daily  nitrofurantoin (MACROBID) 100 milliGRAM(s) Oral two times a day with meals  pantoprazole  Injectable 40 milliGRAM(s) IV Push two times a day  sodium chloride 3%  Inhalation 4 milliLiter(s) Inhalation once    MEDICATIONS  (PRN):  docusate sodium 100 milliGRAM(s) Oral daily PRN Constipation  metoprolol    tartrate Injectable 2.5 milliGRAM(s) IV Push every 6 hours PRN HR >110      Weight:  Most recent wt taken pre-op is 155.1lb/70.5 kg (1/30)  Discussed w/ RN obtaining new standing weight when pt is doing PT. Difficulty to get as pt does not always comply with PT.   Suspect current BW would be inaccurate 2/2 generalized edema    Weight Change:   155.1lb (1/30)  174.8lb (1/15)  175.1lb (1/8/18)  172.5lb (12/19)  171.5lb (12/14)  167lb (11/13)  164lb (10/17)  219lb (8/1)    Estimated energy needs using 52 kg IBW:  increased needs per wound, pressure ulcer and post-op.   30-35 kcal/kg (6393-8170 kcal).   1.8-2 g/kg ( g protein).  30-35 mL/kg (5905-9788 mL fluid).      Calorie Count from 2/18:  Day 1: 15g protein, 580kcal (16% of lower end of estimated protein needs and 37% low end of estimated kcal needs)   Day 2: 20g protein, 519kcal (21% of lower end of estimated protein needs and 35% low end of estimated kcal needs)   Day 3: 21g protein, 434kcal (22% of lower end of estimated protein needs and 28% low end of estimated kcal needs)   Over 3 days, patient met 28-37% of est. caloric needs, and 16-22% of pro needs. No intake of health shake, no high protein options in between meals either. Pt endorsed to RD that she was ready for more solid food.     Subjective:   Pt seen resting in bed. Was not aroused by verbal stimuli. Pt's mental state has been consistently somnolent since Sx 3 weeks ago with poor PO intake and a reduced willingness to participate in nutrition assessments. Currently on PO regular diet.  Calorie count from 2/15-2/17 was completed on 2/18 indicating that pt has been meet 28-37% of estimated energy needs, and 16-22% of estimated protein needs. Pt continues to require a lot of encouragement at meal times. Appreciate support provided. Discussed w/ PA putting patient back on Phase 3 Bariatric Regular diet for additional protein options at meals. Noted no current miscellaneous nursing order for 5 high protein meals -however can be done through coordination of the host and RN- pt can have high protein option from tray (greek yogurt or prostat w/ apple sauce) to be saved for between meals . No new CRP, triglycerides or pre-alb since last follow up. Please continue to trend these. Discussed with team that at present pt is likely getting ~500kcal/day and ~20g pro/day. Recommend continuing w/ nutrition support when medically feasible as her kcal and protein needs are high.     Previous Nutrition Diagnosis: Increased nutrient needs RT increased demand for nutrients AEB wound, pressure ulcer healing and s/p EC fistula take down and subsequent revision of her [id number]     Active [ x  ]  Resolved [   ]    If resolved, new PES:  N/A    Goal: Pt to meet >75% estimated needs PO.     Recommendations:  1) Recommend Phase 3 Bariatric Regular as this will provide patient with additional protein options at meals. Team will need to put in a miscellaneous nursing order for pt to get high protein snacks between meals.  2) Recommend appetite stimulant to optimize PO intake  3) Given prolonged inadequate intake, pt would benefit from additional nutrition support -preferably via enteral nutrition via gut. If feasible recommend nocturnal feeds of Vital 1.5.  4) If pt is to continue with TPN recommend 240g D, 104g AA, 50g 20% lipids TIW (1732kcal, 2g/kg IBW pro, GIR 2.36)  >> If MD prefers it to be over a 12 hour cycle: GIR is 4.73 - TPN should be ramped up the first hour and ramped down last hour (bag infusing at half the rate first hour, at full rate 10 hours and half rate last hour)  5) Please obtain new lab values for pre-alb, CRP, triglycerides  6) Trend TG and adjust lipids PRN per MD discretion. If trending up, consider 20g 20% lipids vs 50g.   7) Please obtain standing weight when feasible.  8) If pt is solely on PO diet recommend MVI daily PO, continue w/ current MVI, OsCal and B12 order.   Plan of care and nutrition recommendations discussed at length with team.     Education: Unable to education at this time. Pt is aware of increased protein needs. Will continue to reinforce importance.    Risk Level: High [  x ] Moderate [   ] Low [   ].

## 2018-02-22 RX ADMIN — MIRTAZAPINE 7.5 MILLIGRAM(S): 45 TABLET, ORALLY DISINTEGRATING ORAL at 21:19

## 2018-02-22 RX ADMIN — LOSARTAN POTASSIUM 50 MILLIGRAM(S): 100 TABLET, FILM COATED ORAL at 05:30

## 2018-02-22 RX ADMIN — ESCITALOPRAM OXALATE 20 MILLIGRAM(S): 10 TABLET, FILM COATED ORAL at 13:04

## 2018-02-22 RX ADMIN — Medication 12.5 MILLIGRAM(S): at 17:38

## 2018-02-22 RX ADMIN — HEPARIN SODIUM 5000 UNIT(S): 5000 INJECTION INTRAVENOUS; SUBCUTANEOUS at 21:19

## 2018-02-22 RX ADMIN — Medication 100 MILLIGRAM(S): at 07:28

## 2018-02-22 RX ADMIN — HEPARIN SODIUM 5000 UNIT(S): 5000 INJECTION INTRAVENOUS; SUBCUTANEOUS at 05:30

## 2018-02-22 RX ADMIN — Medication 12.5 MILLIGRAM(S): at 05:30

## 2018-02-22 RX ADMIN — Medication 5 MILLIGRAM(S): at 05:30

## 2018-02-22 RX ADMIN — Medication 100 MILLIGRAM(S): at 21:18

## 2018-02-22 RX ADMIN — Medication 100 MILLIGRAM(S): at 17:39

## 2018-02-22 RX ADMIN — HEPARIN SODIUM 5000 UNIT(S): 5000 INJECTION INTRAVENOUS; SUBCUTANEOUS at 13:04

## 2018-02-22 RX ADMIN — PANTOPRAZOLE SODIUM 40 MILLIGRAM(S): 20 TABLET, DELAYED RELEASE ORAL at 17:38

## 2018-02-22 RX ADMIN — LORATADINE 10 MILLIGRAM(S): 10 TABLET ORAL at 13:04

## 2018-02-22 RX ADMIN — PANTOPRAZOLE SODIUM 40 MILLIGRAM(S): 20 TABLET, DELAYED RELEASE ORAL at 05:30

## 2018-02-22 RX ADMIN — Medication 1 TABLET(S): at 13:04

## 2018-02-22 RX ADMIN — PREGABALIN 500 MICROGRAM(S): 225 CAPSULE ORAL at 13:04

## 2018-02-22 RX ADMIN — Medication 5 MILLIGRAM(S): at 21:18

## 2018-02-22 RX ADMIN — Medication 5 MILLIGRAM(S): at 17:38

## 2018-02-23 VITALS
TEMPERATURE: 99 F | DIASTOLIC BLOOD PRESSURE: 82 MMHG | HEART RATE: 92 BPM | RESPIRATION RATE: 16 BRPM | OXYGEN SATURATION: 95 % | SYSTOLIC BLOOD PRESSURE: 115 MMHG

## 2018-02-23 PROCEDURE — 87252 VIRUS INOCULATION TISSUE: CPT

## 2018-02-23 PROCEDURE — 49423 EXCHANGE DRAINAGE CATHETER: CPT

## 2018-02-23 PROCEDURE — 82150 ASSAY OF AMYLASE: CPT

## 2018-02-23 PROCEDURE — 82962 GLUCOSE BLOOD TEST: CPT

## 2018-02-23 PROCEDURE — 75984 XRAY CONTROL CATHETER CHANGE: CPT

## 2018-02-23 PROCEDURE — P9045: CPT

## 2018-02-23 PROCEDURE — 84590 ASSAY OF VITAMIN A: CPT

## 2018-02-23 PROCEDURE — 84155 ASSAY OF PROTEIN SERUM: CPT

## 2018-02-23 PROCEDURE — 80053 COMPREHEN METABOLIC PANEL: CPT

## 2018-02-23 PROCEDURE — 87086 URINE CULTURE/COLONY COUNT: CPT

## 2018-02-23 PROCEDURE — 82306 VITAMIN D 25 HYDROXY: CPT

## 2018-02-23 PROCEDURE — 88305 TISSUE EXAM BY PATHOLOGIST: CPT

## 2018-02-23 PROCEDURE — 82570 ASSAY OF URINE CREATININE: CPT

## 2018-02-23 PROCEDURE — 84446 ASSAY OF VITAMIN E: CPT

## 2018-02-23 PROCEDURE — 87184 SC STD DISK METHOD PER PLATE: CPT

## 2018-02-23 PROCEDURE — 86850 RBC ANTIBODY SCREEN: CPT

## 2018-02-23 PROCEDURE — 80202 ASSAY OF VANCOMYCIN: CPT

## 2018-02-23 PROCEDURE — 84478 ASSAY OF TRIGLYCERIDES: CPT

## 2018-02-23 PROCEDURE — 90686 IIV4 VACC NO PRSV 0.5 ML IM: CPT

## 2018-02-23 PROCEDURE — 85018 HEMOGLOBIN: CPT

## 2018-02-23 PROCEDURE — 85025 COMPLETE CBC W/AUTO DIFF WBC: CPT

## 2018-02-23 PROCEDURE — 80076 HEPATIC FUNCTION PANEL: CPT

## 2018-02-23 PROCEDURE — 36430 TRANSFUSION BLD/BLD COMPNT: CPT

## 2018-02-23 PROCEDURE — 87150 DNA/RNA AMPLIFIED PROBE: CPT

## 2018-02-23 PROCEDURE — 93306 TTE W/DOPPLER COMPLETE: CPT

## 2018-02-23 PROCEDURE — C1781: CPT

## 2018-02-23 PROCEDURE — 87116 MYCOBACTERIA CULTURE: CPT

## 2018-02-23 PROCEDURE — 82040 ASSAY OF SERUM ALBUMIN: CPT

## 2018-02-23 PROCEDURE — 84132 ASSAY OF SERUM POTASSIUM: CPT

## 2018-02-23 PROCEDURE — C1769: CPT

## 2018-02-23 PROCEDURE — 83735 ASSAY OF MAGNESIUM: CPT

## 2018-02-23 PROCEDURE — 82803 BLOOD GASES ANY COMBINATION: CPT

## 2018-02-23 PROCEDURE — 81003 URINALYSIS AUTO W/O SCOPE: CPT

## 2018-02-23 PROCEDURE — 71045 X-RAY EXAM CHEST 1 VIEW: CPT

## 2018-02-23 PROCEDURE — 85027 COMPLETE CBC AUTOMATED: CPT

## 2018-02-23 PROCEDURE — 87015 SPECIMEN INFECT AGNT CONCNTJ: CPT

## 2018-02-23 PROCEDURE — 74177 CT ABD & PELVIS W/CONTRAST: CPT

## 2018-02-23 PROCEDURE — C1729: CPT

## 2018-02-23 PROCEDURE — 87075 CULTR BACTERIA EXCEPT BLOOD: CPT

## 2018-02-23 PROCEDURE — 93005 ELECTROCARDIOGRAM TRACING: CPT

## 2018-02-23 PROCEDURE — 87206 SMEAR FLUORESCENT/ACID STAI: CPT

## 2018-02-23 PROCEDURE — 74176 CT ABD & PELVIS W/O CONTRAST: CPT

## 2018-02-23 PROCEDURE — 83615 LACTATE (LD) (LDH) ENZYME: CPT

## 2018-02-23 PROCEDURE — 83605 ASSAY OF LACTIC ACID: CPT

## 2018-02-23 PROCEDURE — 80048 BASIC METABOLIC PNL TOTAL CA: CPT

## 2018-02-23 PROCEDURE — 86901 BLOOD TYPING SEROLOGIC RH(D): CPT

## 2018-02-23 PROCEDURE — 76080 X-RAY EXAM OF FISTULA: CPT

## 2018-02-23 PROCEDURE — P9016: CPT

## 2018-02-23 PROCEDURE — 83970 ASSAY OF PARATHORMONE: CPT

## 2018-02-23 PROCEDURE — 97116 GAIT TRAINING THERAPY: CPT

## 2018-02-23 PROCEDURE — 74241: CPT

## 2018-02-23 PROCEDURE — 81025 URINE PREGNANCY TEST: CPT

## 2018-02-23 PROCEDURE — 82247 BILIRUBIN TOTAL: CPT

## 2018-02-23 PROCEDURE — 80061 LIPID PANEL: CPT

## 2018-02-23 PROCEDURE — 78803 RP LOCLZJ TUM SPECT 1 AREA: CPT

## 2018-02-23 PROCEDURE — 84484 ASSAY OF TROPONIN QUANT: CPT

## 2018-02-23 PROCEDURE — P9047: CPT

## 2018-02-23 PROCEDURE — 82310 ASSAY OF CALCIUM: CPT

## 2018-02-23 PROCEDURE — 86923 COMPATIBILITY TEST ELECTRIC: CPT

## 2018-02-23 PROCEDURE — 87186 SC STD MICRODIL/AGAR DIL: CPT

## 2018-02-23 PROCEDURE — 87040 BLOOD CULTURE FOR BACTERIA: CPT

## 2018-02-23 PROCEDURE — 82652 VIT D 1 25-DIHYDROXY: CPT

## 2018-02-23 PROCEDURE — 74018 RADEX ABDOMEN 1 VIEW: CPT

## 2018-02-23 PROCEDURE — 88307 TISSUE EXAM BY PATHOLOGIST: CPT

## 2018-02-23 PROCEDURE — 85610 PROTHROMBIN TIME: CPT

## 2018-02-23 PROCEDURE — 82553 CREATINE MB FRACTION: CPT

## 2018-02-23 PROCEDURE — 84300 ASSAY OF URINE SODIUM: CPT

## 2018-02-23 PROCEDURE — 36415 COLL VENOUS BLD VENIPUNCTURE: CPT

## 2018-02-23 PROCEDURE — 84597 ASSAY OF VITAMIN K: CPT

## 2018-02-23 PROCEDURE — 84134 ASSAY OF PREALBUMIN: CPT

## 2018-02-23 PROCEDURE — 93971 EXTREMITY STUDY: CPT

## 2018-02-23 PROCEDURE — 81001 URINALYSIS AUTO W/SCOPE: CPT

## 2018-02-23 PROCEDURE — 76770 US EXAM ABDO BACK WALL COMP: CPT

## 2018-02-23 PROCEDURE — 94003 VENT MGMT INPAT SUBQ DAY: CPT

## 2018-02-23 PROCEDURE — 87205 SMEAR GRAM STAIN: CPT

## 2018-02-23 PROCEDURE — 82330 ASSAY OF CALCIUM: CPT

## 2018-02-23 PROCEDURE — 84295 ASSAY OF SERUM SODIUM: CPT

## 2018-02-23 PROCEDURE — 71260 CT THORAX DX C+: CPT

## 2018-02-23 PROCEDURE — 97164 PT RE-EVAL EST PLAN CARE: CPT

## 2018-02-23 PROCEDURE — 49424 ASSESS CYST CONTRAST INJECT: CPT

## 2018-02-23 PROCEDURE — 97110 THERAPEUTIC EXERCISES: CPT

## 2018-02-23 PROCEDURE — 82248 BILIRUBIN DIRECT: CPT

## 2018-02-23 PROCEDURE — 82550 ASSAY OF CK (CPK): CPT

## 2018-02-23 PROCEDURE — 78802 RP LOCLZJ TUM WHBDY 1 D IMG: CPT

## 2018-02-23 PROCEDURE — 97530 THERAPEUTIC ACTIVITIES: CPT

## 2018-02-23 PROCEDURE — 86140 C-REACTIVE PROTEIN: CPT

## 2018-02-23 PROCEDURE — 85730 THROMBOPLASTIN TIME PARTIAL: CPT

## 2018-02-23 PROCEDURE — 83690 ASSAY OF LIPASE: CPT

## 2018-02-23 PROCEDURE — 86900 BLOOD TYPING SEROLOGIC ABO: CPT

## 2018-02-23 PROCEDURE — C8925: CPT

## 2018-02-23 PROCEDURE — L8699: CPT

## 2018-02-23 PROCEDURE — 84100 ASSAY OF PHOSPHORUS: CPT

## 2018-02-23 PROCEDURE — C1887: CPT

## 2018-02-23 PROCEDURE — 97161 PT EVAL LOW COMPLEX 20 MIN: CPT

## 2018-02-23 PROCEDURE — 83935 ASSAY OF URINE OSMOLALITY: CPT

## 2018-02-23 PROCEDURE — 87102 FUNGUS ISOLATION CULTURE: CPT

## 2018-02-23 PROCEDURE — 87070 CULTURE OTHR SPECIMN AEROBIC: CPT

## 2018-02-23 PROCEDURE — A9556: CPT

## 2018-02-23 RX ORDER — LORATADINE 10 MG/1
1 TABLET ORAL
Qty: 0 | Refills: 0 | COMMUNITY
Start: 2018-02-23

## 2018-02-23 RX ORDER — PANTOPRAZOLE SODIUM 20 MG/1
40 TABLET, DELAYED RELEASE ORAL
Qty: 0 | Refills: 0 | Status: DISCONTINUED | OUTPATIENT
Start: 2018-02-23 | End: 2018-02-23

## 2018-02-23 RX ORDER — MIRTAZAPINE 45 MG/1
1 TABLET, ORALLY DISINTEGRATING ORAL
Qty: 0 | Refills: 0 | COMMUNITY
Start: 2018-02-23

## 2018-02-23 RX ADMIN — HEPARIN SODIUM 5000 UNIT(S): 5000 INJECTION INTRAVENOUS; SUBCUTANEOUS at 06:49

## 2018-02-23 RX ADMIN — PREGABALIN 500 MICROGRAM(S): 225 CAPSULE ORAL at 11:31

## 2018-02-23 RX ADMIN — Medication 1 TABLET(S): at 11:31

## 2018-02-23 RX ADMIN — ESCITALOPRAM OXALATE 20 MILLIGRAM(S): 10 TABLET, FILM COATED ORAL at 11:31

## 2018-02-23 RX ADMIN — Medication 12.5 MILLIGRAM(S): at 06:49

## 2018-02-23 RX ADMIN — Medication 1 TABLET(S): at 11:18

## 2018-02-23 RX ADMIN — Medication 100 MILLIGRAM(S): at 11:18

## 2018-02-23 RX ADMIN — LORATADINE 10 MILLIGRAM(S): 10 TABLET ORAL at 11:31

## 2018-02-23 RX ADMIN — LOSARTAN POTASSIUM 50 MILLIGRAM(S): 100 TABLET, FILM COATED ORAL at 06:48

## 2018-02-23 RX ADMIN — PANTOPRAZOLE SODIUM 40 MILLIGRAM(S): 20 TABLET, DELAYED RELEASE ORAL at 06:48

## 2018-02-23 RX ADMIN — HEPARIN SODIUM 5000 UNIT(S): 5000 INJECTION INTRAVENOUS; SUBCUTANEOUS at 13:22

## 2018-02-23 NOTE — PROGRESS NOTE ADULT - I WAS PHYSICALLY PRESENT FOR THE KEY PORTIONS OF THE EVALUATION AND MANAGEMENT (E/M) SERVICE PROVIDED.  I AGREE WITH THE ABOVE HISTORY, PHYSICAL, AND PLAN WHICH I HAVE REVIEWED AND EDITED WHERE APPROPRIATE

## 2018-02-23 NOTE — PROGRESS NOTE ADULT - SUBJECTIVE AND OBJECTIVE BOX
Physical Medicine and Rehabilitation Progress Note    AISSATOU DAVIS    MRN-[id number]    Patient is a 57y old  Female who presents with a chief complaint of Patient had longstanding enterocutaneous fistula which she presents for resection (17 Feb 2018 01:06)      Vital Signs Last 24 Hrs  T(C): 37.2 (23 Feb 2018 13:24), Max: 37.6 (22 Feb 2018 20:36)  T(F): 99 (23 Feb 2018 13:24), Max: 99.6 (22 Feb 2018 20:36)  HR: 92 (23 Feb 2018 13:24) (70 - 94)  BP: 115/82 (23 Feb 2018 13:24) (115/82 - 149/82)  BP(mean): --  RR: 16 (23 Feb 2018 13:24) (14 - 17)  SpO2: 95% (23 Feb 2018 13:24) (95% - 97%)    Current Functional Status in Physical Therapy:  lying in bed comfortably.  No pain. Deconditioned.  On PT for therapeutic ex. and gait training.    Bed Mobility :minimal assistance    Transfers:  moderate assistance  to stand and minimal assistance for transfer activity with rolling walker    Ambulation: minimal assistance with rolling walker for short distance, p oor endurance, needs maximal encouragement      Impression: 1.econditioned.  2. Obesity with multiple abdominal surgeries.      Recommendations:  1. Continue PT for therapeutic ex.  and gait training with device.  2. Subacute rehab.  placement

## 2018-02-23 NOTE — PROGRESS NOTE ADULT - SUBJECTIVE AND OBJECTIVE BOX
O/N: VSS, BRAYDEN  2/22: Awaiting MICHELE    POD: 1/30 Ex-lap, resection of EC fistula, SBRx2 with patino handsewn anastomosis, JPx2 (SQ) placement, primary abdominal closure with b/l external oblique release and prevena placement. O/N: VSS, BRAYDEN  2/22: Awaiting MICHELE    POD: 1/30 Ex-lap, resection of EC fistula, SBRx2 with patino handsewn anastomosis, JPx2 (SQ) placement, primary abdominal closure with b/l external oblique release and prevena placement.        Vital Signs Last 24 Hrs  T(C): 36.7 (23 Feb 2018 06:00), Max: 37.6 (22 Feb 2018 20:36)  T(F): 98.1 (23 Feb 2018 06:00), Max: 99.6 (22 Feb 2018 20:36)  HR: 90 (23 Feb 2018 06:00) (79 - 94)  BP: 148/86 (23 Feb 2018 06:00) (140/88 - 166/98)  BP(mean): --  RR: 16 (23 Feb 2018 06:00) (16 - 17)  SpO2: 95% (23 Feb 2018 06:00) (95% - 97%)      I&O's Summary    22 Feb 2018 07:01  -  23 Feb 2018 07:00  --------------------------------------------------------  IN: 200 mL / OUT: 0 mL / NET: 200 mL        Gen: NAD   Abd: soft / nt / nd   incisions c/d/i

## 2018-02-27 ENCOUNTER — EMERGENCY (EMERGENCY)
Facility: HOSPITAL | Age: 58
LOS: 1 days | Discharge: HOME | End: 2018-02-27
Attending: EMERGENCY MEDICINE | Admitting: EMERGENCY MEDICINE
Payer: COMMERCIAL

## 2018-02-27 VITALS
SYSTOLIC BLOOD PRESSURE: 117 MMHG | OXYGEN SATURATION: 98 % | RESPIRATION RATE: 16 BRPM | TEMPERATURE: 99 F | DIASTOLIC BLOOD PRESSURE: 79 MMHG | HEART RATE: 95 BPM

## 2018-02-27 VITALS
SYSTOLIC BLOOD PRESSURE: 120 MMHG | DIASTOLIC BLOOD PRESSURE: 87 MMHG | WEIGHT: 167.99 LBS | TEMPERATURE: 99 F | RESPIRATION RATE: 18 BRPM | HEART RATE: 97 BPM | OXYGEN SATURATION: 97 %

## 2018-02-27 DIAGNOSIS — Z98.84 BARIATRIC SURGERY STATUS: Chronic | ICD-10-CM

## 2018-02-27 DIAGNOSIS — Z88.8 ALLERGY STATUS TO OTHER DRUGS, MEDICAMENTS AND BIOLOGICAL SUBSTANCES STATUS: ICD-10-CM

## 2018-02-27 DIAGNOSIS — Z79.899 OTHER LONG TERM (CURRENT) DRUG THERAPY: ICD-10-CM

## 2018-02-27 DIAGNOSIS — M79.81 NONTRAUMATIC HEMATOMA OF SOFT TISSUE: ICD-10-CM

## 2018-02-27 DIAGNOSIS — Z41.9 ENCOUNTER FOR PROCEDURE FOR PURPOSES OTHER THAN REMEDYING HEALTH STATE, UNSPECIFIED: Chronic | ICD-10-CM

## 2018-02-27 DIAGNOSIS — R19.7 DIARRHEA, UNSPECIFIED: ICD-10-CM

## 2018-02-27 DIAGNOSIS — Z88.2 ALLERGY STATUS TO SULFONAMIDES: ICD-10-CM

## 2018-02-27 DIAGNOSIS — I10 ESSENTIAL (PRIMARY) HYPERTENSION: ICD-10-CM

## 2018-02-27 LAB
ALBUMIN SERPL ELPH-MCNC: 2.6 G/DL — LOW (ref 3.3–5)
ALP SERPL-CCNC: 275 U/L — HIGH (ref 40–120)
ALT FLD-CCNC: 17 U/L — SIGNIFICANT CHANGE UP (ref 10–45)
ANION GAP SERPL CALC-SCNC: 16 MMOL/L — SIGNIFICANT CHANGE UP (ref 5–17)
APTT BLD: 28.8 SEC — SIGNIFICANT CHANGE UP (ref 27.5–37.4)
AST SERPL-CCNC: 31 U/L — SIGNIFICANT CHANGE UP (ref 10–40)
BASOPHILS NFR BLD AUTO: 0.4 % — SIGNIFICANT CHANGE UP (ref 0–2)
BILIRUB SERPL-MCNC: 1 MG/DL — SIGNIFICANT CHANGE UP (ref 0.2–1.2)
BLD GP AB SCN SERPL QL: NEGATIVE — SIGNIFICANT CHANGE UP
BUN SERPL-MCNC: 20 MG/DL — SIGNIFICANT CHANGE UP (ref 7–23)
CALCIUM SERPL-MCNC: 9 MG/DL — SIGNIFICANT CHANGE UP (ref 8.4–10.5)
CHLORIDE SERPL-SCNC: 104 MMOL/L — SIGNIFICANT CHANGE UP (ref 96–108)
CO2 SERPL-SCNC: 21 MMOL/L — LOW (ref 22–31)
CREAT SERPL-MCNC: 1.07 MG/DL — SIGNIFICANT CHANGE UP (ref 0.5–1.3)
EOSINOPHIL NFR BLD AUTO: 1.6 % — SIGNIFICANT CHANGE UP (ref 0–6)
GLUCOSE SERPL-MCNC: 93 MG/DL — SIGNIFICANT CHANGE UP (ref 70–99)
HCT VFR BLD CALC: 31.6 % — LOW (ref 34.5–45)
HGB BLD-MCNC: 9.2 G/DL — LOW (ref 11.5–15.5)
INR BLD: 1.2 — HIGH (ref 0.88–1.16)
LACTATE SERPL-SCNC: 1.6 MMOL/L — SIGNIFICANT CHANGE UP (ref 0.5–2)
LYMPHOCYTES # BLD AUTO: 15.3 % — SIGNIFICANT CHANGE UP (ref 13–44)
MCHC RBC-ENTMCNC: 27.6 PG — SIGNIFICANT CHANGE UP (ref 27–34)
MCHC RBC-ENTMCNC: 29.1 G/DL — LOW (ref 32–36)
MCV RBC AUTO: 94.9 FL — SIGNIFICANT CHANGE UP (ref 80–100)
MONOCYTES NFR BLD AUTO: 7.9 % — SIGNIFICANT CHANGE UP (ref 2–14)
NEUTROPHILS NFR BLD AUTO: 74.8 % — SIGNIFICANT CHANGE UP (ref 43–77)
PLATELET # BLD AUTO: 406 K/UL — HIGH (ref 150–400)
POTASSIUM SERPL-MCNC: 4.4 MMOL/L — SIGNIFICANT CHANGE UP (ref 3.5–5.3)
POTASSIUM SERPL-SCNC: 4.4 MMOL/L — SIGNIFICANT CHANGE UP (ref 3.5–5.3)
PROT SERPL-MCNC: 7.3 G/DL — SIGNIFICANT CHANGE UP (ref 6–8.3)
PROTHROM AB SERPL-ACNC: 13.4 SEC — HIGH (ref 9.8–12.7)
RBC # BLD: 3.33 M/UL — LOW (ref 3.8–5.2)
RBC # FLD: 17.5 % — HIGH (ref 10.3–16.9)
RH IG SCN BLD-IMP: POSITIVE — SIGNIFICANT CHANGE UP
SODIUM SERPL-SCNC: 141 MMOL/L — SIGNIFICANT CHANGE UP (ref 135–145)
WBC # BLD: 12.1 K/UL — HIGH (ref 3.8–10.5)
WBC # FLD AUTO: 12.1 K/UL — HIGH (ref 3.8–10.5)

## 2018-02-27 PROCEDURE — 99284 EMERGENCY DEPT VISIT MOD MDM: CPT | Mod: 25

## 2018-02-27 PROCEDURE — 83605 ASSAY OF LACTIC ACID: CPT

## 2018-02-27 PROCEDURE — 86850 RBC ANTIBODY SCREEN: CPT

## 2018-02-27 PROCEDURE — 85610 PROTHROMBIN TIME: CPT

## 2018-02-27 PROCEDURE — 74177 CT ABD & PELVIS W/CONTRAST: CPT

## 2018-02-27 PROCEDURE — 86901 BLOOD TYPING SEROLOGIC RH(D): CPT

## 2018-02-27 PROCEDURE — 36415 COLL VENOUS BLD VENIPUNCTURE: CPT

## 2018-02-27 PROCEDURE — 74177 CT ABD & PELVIS W/CONTRAST: CPT | Mod: 26

## 2018-02-27 PROCEDURE — 87040 BLOOD CULTURE FOR BACTERIA: CPT

## 2018-02-27 PROCEDURE — 86900 BLOOD TYPING SEROLOGIC ABO: CPT

## 2018-02-27 PROCEDURE — 80053 COMPREHEN METABOLIC PANEL: CPT

## 2018-02-27 PROCEDURE — 85730 THROMBOPLASTIN TIME PARTIAL: CPT

## 2018-02-27 PROCEDURE — 85025 COMPLETE CBC W/AUTO DIFF WBC: CPT

## 2018-02-27 PROCEDURE — 99285 EMERGENCY DEPT VISIT HI MDM: CPT

## 2018-02-27 RX ADMIN — Medication 300 MILLIGRAM(S): at 19:46

## 2018-02-27 NOTE — CONSULT NOTE ADULT - SUBJECTIVE AND OBJECTIVE BOX
Patient is a 57y old  Female who presents with a chief complaint of     HPI:  57F pt well known to the surgical service with a complicated surgical history, sent from rehab due to a low hemoglobin to 5.2 this morning. Patient denies any symptoms. Says that she has been working with physical therapy daily. Has a good appetite. Denies fevers, chills, sweats. Denies weakness or fatigue. Denies SOB or chest pain.   Patient brought in lab sheet from rehab which shows a Hgb of 5.2. In ed patient HDS and in NAD. Normothermic.     PAST MEDICAL & SURGICAL HISTORY:  Reflux  Obesity  DVT (deep venous thrombosis): 2013 neck  Diverticulitis  Hypertension  Elective surgery: abodominal wall surgery  dec 2016  Elective surgery: NOV 2016 gastric bypass revision  Gastric bypass status for obesity: gastric sleeve, 6/2012  S/P breast biopsy: 2011, left  S/P colon resection: 2011  S/P knee surgery: repair 2009, 2011  right; left  Umbilical hernia: 2000  S/P appendectomy: 1975  S/P tonsillectomy: 1967    FAMILY HISTORY:  No pertinent family history in first degree relatives    SOCIAL HISTORY:  Smoking Status: [ ] Current, [ ] Former, [ ] Never  Pack Years:    MEDICATIONS:  Pulmonary:    Antimicrobials:  clindamycin   Capsule 300 milliGRAM(s) Oral Once    Anticoagulants:  Onc:  GI/:  Endocrine:  Cardiac:  Other Medications:    Allergies    sulfa drugs (Unknown)  sulfamethoxazole (Other)    Intolerances    Vital Signs Last 24 Hrs  T(C): 37.3 (27 Feb 2018 15:57), Max: 37.3 (27 Feb 2018 15:57)  T(F): 99.1 (27 Feb 2018 15:57), Max: 99.1 (27 Feb 2018 15:57)  HR: 97 (27 Feb 2018 15:57) (97 - 97)  BP: 120/87 (27 Feb 2018 15:57) (120/87 - 120/87)  BP(mean): --  RR: 18 (27 Feb 2018 15:57) (18 - 18)  SpO2: 97% (27 Feb 2018 15:57) (97% - 97%)    LABS:  CBC Full  -  ( 27 Feb 2018 16:45 )  WBC Count : 12.1 K/uL  Hemoglobin : 9.2 g/dL  Hematocrit : 31.6 %  Platelet Count - Automated : 406 K/uL  Mean Cell Volume : 94.9 fL  Mean Cell Hemoglobin : 27.6 pg  Mean Cell Hemoglobin Concentration : 29.1 g/dL  Auto Neutrophil # : x  Auto Lymphocyte # : x  Auto Monocyte # : x  Auto Eosinophil # : x  Auto Basophil # : x  Auto Neutrophil % : 74.8 %  Auto Lymphocyte % : 15.3 %  Auto Monocyte % : 7.9 %  Auto Eosinophil % : 1.6 %  Auto Basophil % : 0.4 %    02-27    141  |  104  |  20  ----------------------------<  93  4.4   |  21<L>  |  1.07    Ca    9.0      27 Feb 2018 16:45    TPro  7.3  /  Alb  2.6<L>  /  TBili  1.0  /  DBili  x   /  AST  31  /  ALT  17  /  AlkPhos  275<H>  02-27    PT/INR - ( 27 Feb 2018 16:45 )   PT: 13.4 sec;   INR: 1.20          PTT - ( 27 Feb 2018 16:45 )  PTT:28.8 sec                  RADIOLOGY & ADDITIONAL STUDIES (The following images were personally reviewed):

## 2018-02-27 NOTE — ED PROVIDER NOTE - MEDICAL DECISION MAKING DETAILS
57F with h/o multiple abdominal surgeries including gastric bypass, EC fistula repair, SBR, hernia repair who was DC'd from  4 days ago, returns for abnormal labs, also c/f new abdominal collection. Plan: labs, CT a/p, surgery consulted and down to see the patient. Dispo pending workup.

## 2018-02-27 NOTE — ED PROVIDER NOTE - OBJECTIVE STATEMENT
57F with a h/o gastric bypass surgery in 2012, h/o enterocutaneous fistula, s/p Ex-lap on 1/20 with resection of EC fistula, SBRx2, cholecystostomy tube who was DC'd to rehab facility 3 days ago, returns for anemia and leukocytosis on outpatient labs. pt c/o spitting up some food and some diarrhea, also states nurse noted foul smelling drainage from lower portion of abdominal incision today. No f/c, no cp/sob. 57F with a h/o gastric bypass surgery in 2012, h/o enterocutaneous fistula, s/p Ex-lap on 1/20 with resection of EC fistula, SBRx2, cholecystostomy tube who was DC'd to rehab facility 3 days ago, returns for anemia and leukocytosis on outpatient labs (hgb 5, WBC 18). pt c/o spitting up some food and some diarrhea, also states nurse noted foul smelling drainage from lower portion of abdominal incision today. No f/c, no cp/sob. Pt states CHECO drains removed prior to DC from the hospital. 57F with a h/o gastric bypass surgery in 2012, h/o enterocutaneous fistula, s/p Ex-lap on 1/20 with resection of EC fistula, SBRx2, cholecystostomy tube who was DC'd to rehab facility 3 days ago, returns for anemia and leukocytosis on outpatient labs (hgb 5, WBC 18). pt c/o spitting up some food and some diarrhea, also states nurse noted foul smelling drainage from lower portion of abdominal incision today. No f/c, no cp/sob. Pt states CHECO drains removed prior to DC from the hospital.  Surgeon is Dr. Bonilla

## 2018-02-27 NOTE — CONSULT NOTE ADULT - ASSESSMENT
57F pt well known to the surgical service with a complicated past surgical history in no acute distress. Hemodynamically stable. H/H improved from discharge. Imaging consistent with sterile seroma which is common after undermining of subcutaneous tissue for her component separation.     -May send patient to rehab center  -Please administer 10 days of bactrim for surgical wound prophylaxis  -Follow up with Dr. Brady in 7-10 days in his office  -Abdominal binder

## 2018-02-27 NOTE — ED PROVIDER NOTE - PROGRESS NOTE DETAILS
surgery resident alexys consulted and down to see patient in the ED. Labs show discrepancy from outpatient results. Hgb 9 and wbc 12 here. Surgery resident performed unofficial bedside sono that shows large 5 x 10cm fluid collection in the left abdomen. Ct a/p with Iv contrast for further eval. Labs show discrepancy from outpatient results. Hgb 9 and wbc 12 here. Surgery resident performed unofficial bedside sono that shows large 5 x 10cm fluid collection in the left abdomen. Ct a/p with Iv contrast for further eval. Surg resident d/w Dr. Vernon and they want to r/u rectum sheath hematoma as well. Labs show discrepancy from outpatient results. Hgb 9 and wbc 12 here. Surgery resident performed unofficial bedside sono that shows large 5 x 10cm fluid collection in the left abdomen. Ct a/p with Iv contrast for further eval. Surg resident d/w Dr. Vernon and they want to r/u rectus sheath hematoma as well. Ct reviewed by surgery resident and attending.  they believe subcutaneous fluid collection does not require acute intervention and may be followed up as an outpatient. Abx recommended for prophylaxis. given sulfa allergy, will rx clinda. patient stable for DC. Results faxed to rehab.  The patient is stable for DC. They were advised to call their PMD for prompt outpatient follow up. Return precautions were discussed. The patient was advised to return to the ER for any concerning or worsening symptoms.

## 2018-02-27 NOTE — ED PROVIDER NOTE - PHYSICAL EXAMINATION
GEN: WN, WD, NTAF, awake, alert, oriented to person, place, time/situation and in no apparent distress.  ENT: Airway patent, Nasal mucosa clear. Mouth with normal mucosa.  EYES: Clear bilaterally.  RESPIRATORY: Breathing comfortably with normal RR. No w/c/r.   CARDIAC: Regular rate and rhythm  ABDOMEN: Soft, Cholecystostomy tube in place, nontender, +bowel sounds, no rebound, rigidity, or guarding. Ex-lap incision with granulation tissue and serous drainage at lower edge. Please refer to surgery note for complete details.   MSK: Range of motion is not limited, no deformities noted.  NEURO: Alert and oriented, no focal deficits.  SKIN: Skin normal color for race, warm,. See Abd.  PSYCH: Alert and oriented to person, place, time/situation. normal mood and affect. no apparent risk to self or others. GEN: WN, WD, NTAF, awake, alert, oriented to person, place, time/situation and in no apparent distress.  ENT: Airway patent, Nasal mucosa clear. Mouth with normal mucosa.  EYES: Clear bilaterally.  RESPIRATORY: Breathing comfortably with normal RR. No w/c/r.   CARDIAC: Regular rate and rhythm  ABDOMEN: Soft, large left abdominal fullness noted, Cholecystostomy tube in place, abd nontender, +bowel sounds, no rebound, rigidity, or guarding. Ex-lap incision with granulation tissue and serous drainage at lower edge. Please refer to surgery note for complete details.   MSK: Range of motion is not limited, no deformities noted.  NEURO: Alert and oriented, no focal deficits.  SKIN: Skin normal color for race, warm,. See Abd.  PSYCH: Alert and oriented to person, place, time/situation. normal mood and affect. no apparent risk to self or others.

## 2018-02-27 NOTE — ED ADULT NURSE NOTE - OBJECTIVE STATEMENT
57y F, A&ox3, presents to ED with  from EMS from rehab center after pr recently d/c from hospital after complications from previous bariatric surgery. Note red with yellow/white drainage to surgical site to lower abdomen, midline. Noted j tube to abdomen. No fever, no chills, no cp. Pt also reports sent in due to low hh/hct. No cp, no sob, no lightheadedness, no dizziness. No bloody stool nor bloody emesis. No urinary s/s. Will continue to monitor.

## 2018-02-27 NOTE — CONSULT NOTE ADULT - GASTROINTESTINAL COMMENTS
fullness noted in left abdomen, ultrasound assessment revealed fluid collection, which was tapped under direct visualization with yield of 1 cc of serosanguinous fluid. Incision with areas of increased fibrin deposits as well as scabs in proximal area, no purulence or erythema noticed

## 2018-02-27 NOTE — ED ADULT TRIAGE NOTE - CHIEF COMPLAINT QUOTE
Pt states she was discharged from [facility] on Friday, sent to rehab and was told today her Hgb is low, pt also vomited today. Hx of anemia.

## 2018-02-27 NOTE — ED ADULT NURSE NOTE - PSH
Elective surgery  abodominal wall surgery  dec 2016  Elective surgery  NOV 2016 gastric bypass revision  Gastric bypass status for obesity  gastric sleeve, 6/2012  S/P appendectomy  1975  S/P breast biopsy  2011, left  S/P colon resection  2011  S/P knee surgery  repair 2009, 2011  right; left  S/P tonsillectomy  1967  Umbilical hernia  2000

## 2018-03-02 DIAGNOSIS — N39.0 URINARY TRACT INFECTION, SITE NOT SPECIFIED: ICD-10-CM

## 2018-03-02 DIAGNOSIS — I10 ESSENTIAL (PRIMARY) HYPERTENSION: ICD-10-CM

## 2018-03-02 DIAGNOSIS — I47.1 SUPRAVENTRICULAR TACHYCARDIA: ICD-10-CM

## 2018-03-02 DIAGNOSIS — K83.8 OTHER SPECIFIED DISEASES OF BILIARY TRACT: ICD-10-CM
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
## 2018-03-02 DIAGNOSIS — J98.09 OTHER DISEASES OF BRONCHUS, NOT ELSEWHERE CLASSIFIED: ICD-10-CM

## 2018-03-02 DIAGNOSIS — R65.21 SEVERE SEPSIS WITH SEPTIC SHOCK: ICD-10-CM
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
## 2018-03-02 DIAGNOSIS — T81.83XA PERSISTENT POSTPROCEDURAL FISTULA, INITIAL ENCOUNTER: ICD-10-CM

## 2018-03-02 DIAGNOSIS — A41.9 SEPSIS, UNSPECIFIED ORGANISM: ICD-10-CM

## 2018-03-02 DIAGNOSIS — F41.8 OTHER SPECIFIED ANXIETY DISORDERS: ICD-10-CM

## 2018-03-02 DIAGNOSIS — K91.89 OTHER POSTPROCEDURAL COMPLICATIONS AND DISORDERS OF DIGESTIVE SYSTEM: ICD-10-CM

## 2018-03-02 DIAGNOSIS — J96.00 ACUTE RESPIRATORY FAILURE, UNSPECIFIED WHETHER WITH HYPOXIA OR HYPERCAPNIA: ICD-10-CM

## 2018-03-02 DIAGNOSIS — N17.0 ACUTE KIDNEY FAILURE WITH TUBULAR NECROSIS: ICD-10-CM

## 2018-03-02 DIAGNOSIS — K22.2 ESOPHAGEAL OBSTRUCTION: ICD-10-CM

## 2018-03-02 DIAGNOSIS — J98.11 ATELECTASIS: ICD-10-CM

## 2018-03-02 DIAGNOSIS — K63.2 FISTULA OF INTESTINE: ICD-10-CM

## 2018-03-02 DIAGNOSIS — T81.4XXA INFECTION FOLLOWING A PROCEDURE, INITIAL ENCOUNTER: ICD-10-CM

## 2018-03-02 DIAGNOSIS — D62 ACUTE POSTHEMORRHAGIC ANEMIA: ICD-10-CM

## 2018-03-02 DIAGNOSIS — K83.1 OBSTRUCTION OF BILE DUCT: ICD-10-CM

## 2018-03-02 DIAGNOSIS — E87.2 ACIDOSIS: ICD-10-CM

## 2018-03-02 DIAGNOSIS — F43.24 ADJUSTMENT DISORDER WITH DISTURBANCE OF CONDUCT: ICD-10-CM

## 2018-03-02 DIAGNOSIS — M72.8 OTHER FIBROBLASTIC DISORDERS: ICD-10-CM

## 2018-03-02 DIAGNOSIS — E43 UNSPECIFIED SEVERE PROTEIN-CALORIE MALNUTRITION: ICD-10-CM

## 2018-03-02 DIAGNOSIS — K66.0 PERITONEAL ADHESIONS (POSTPROCEDURAL) (POSTINFECTION): ICD-10-CM

## 2018-03-04 LAB
CULTURE RESULTS: SIGNIFICANT CHANGE UP
CULTURE RESULTS: SIGNIFICANT CHANGE UP
SPECIMEN SOURCE: SIGNIFICANT CHANGE UP
SPECIMEN SOURCE: SIGNIFICANT CHANGE UP

## 2018-03-05 ENCOUNTER — EMERGENCY (EMERGENCY)
Facility: HOSPITAL | Age: 58
LOS: 1 days | Discharge: HOME | End: 2018-03-05
Attending: EMERGENCY MEDICINE | Admitting: EMERGENCY MEDICINE
Payer: COMMERCIAL

## 2018-03-05 VITALS
DIASTOLIC BLOOD PRESSURE: 72 MMHG | OXYGEN SATURATION: 99 % | SYSTOLIC BLOOD PRESSURE: 118 MMHG | HEART RATE: 85 BPM | TEMPERATURE: 99 F | RESPIRATION RATE: 17 BRPM

## 2018-03-05 VITALS
SYSTOLIC BLOOD PRESSURE: 114 MMHG | WEIGHT: 164.91 LBS | RESPIRATION RATE: 18 BRPM | HEART RATE: 90 BPM | TEMPERATURE: 99 F | DIASTOLIC BLOOD PRESSURE: 85 MMHG | OXYGEN SATURATION: 97 %

## 2018-03-05 DIAGNOSIS — L02.211 CUTANEOUS ABSCESS OF ABDOMINAL WALL: ICD-10-CM

## 2018-03-05 DIAGNOSIS — I10 ESSENTIAL (PRIMARY) HYPERTENSION: ICD-10-CM

## 2018-03-05 DIAGNOSIS — Z41.9 ENCOUNTER FOR PROCEDURE FOR PURPOSES OTHER THAN REMEDYING HEALTH STATE, UNSPECIFIED: Chronic | ICD-10-CM

## 2018-03-05 DIAGNOSIS — Z79.899 OTHER LONG TERM (CURRENT) DRUG THERAPY: ICD-10-CM

## 2018-03-05 DIAGNOSIS — Z98.84 BARIATRIC SURGERY STATUS: Chronic | ICD-10-CM

## 2018-03-05 DIAGNOSIS — Z79.2 LONG TERM (CURRENT) USE OF ANTIBIOTICS: ICD-10-CM

## 2018-03-05 LAB
APTT BLD: 29.3 SEC — SIGNIFICANT CHANGE UP (ref 27.5–37.4)
INR BLD: 1.24 — HIGH (ref 0.88–1.16)
LACTATE SERPL-SCNC: 1.4 MMOL/L — SIGNIFICANT CHANGE UP (ref 0.5–2)
PROTHROM AB SERPL-ACNC: 13.8 SEC — HIGH (ref 9.8–12.7)

## 2018-03-05 PROCEDURE — 85025 COMPLETE CBC W/AUTO DIFF WBC: CPT

## 2018-03-05 PROCEDURE — 96374 THER/PROPH/DIAG INJ IV PUSH: CPT | Mod: XU

## 2018-03-05 PROCEDURE — 83605 ASSAY OF LACTIC ACID: CPT

## 2018-03-05 PROCEDURE — 99284 EMERGENCY DEPT VISIT MOD MDM: CPT

## 2018-03-05 PROCEDURE — 10061 I&D ABSCESS COMP/MULTIPLE: CPT | Mod: XU

## 2018-03-05 PROCEDURE — 36415 COLL VENOUS BLD VENIPUNCTURE: CPT

## 2018-03-05 PROCEDURE — 99284 EMERGENCY DEPT VISIT MOD MDM: CPT | Mod: 25

## 2018-03-05 PROCEDURE — 80053 COMPREHEN METABOLIC PANEL: CPT

## 2018-03-05 PROCEDURE — 85610 PROTHROMBIN TIME: CPT

## 2018-03-05 PROCEDURE — 85730 THROMBOPLASTIN TIME PARTIAL: CPT

## 2018-03-05 RX ORDER — PANTOPRAZOLE SODIUM 20 MG/1
1 TABLET, DELAYED RELEASE ORAL
Qty: 0 | Refills: 0 | COMMUNITY

## 2018-03-05 RX ORDER — SUCRALFATE 1 G
0 TABLET ORAL
Qty: 0 | Refills: 0 | COMMUNITY

## 2018-03-05 RX ORDER — HYDROXYZINE HCL 10 MG
0 TABLET ORAL
Qty: 0 | Refills: 0 | COMMUNITY

## 2018-03-05 RX ORDER — ONDANSETRON 8 MG/1
4 TABLET, FILM COATED ORAL ONCE
Qty: 0 | Refills: 0 | Status: COMPLETED | OUTPATIENT
Start: 2018-03-05 | End: 2018-03-05

## 2018-03-05 RX ORDER — ACETAMINOPHEN 500 MG
0 TABLET ORAL
Qty: 0 | Refills: 0 | COMMUNITY

## 2018-03-05 RX ORDER — SODIUM CHLORIDE 9 MG/ML
1000 INJECTION INTRAMUSCULAR; INTRAVENOUS; SUBCUTANEOUS ONCE
Qty: 0 | Refills: 0 | Status: COMPLETED | OUTPATIENT
Start: 2018-03-05 | End: 2018-03-05

## 2018-03-05 RX ADMIN — SODIUM CHLORIDE 2000 MILLILITER(S): 9 INJECTION INTRAMUSCULAR; INTRAVENOUS; SUBCUTANEOUS at 22:32

## 2018-03-05 RX ADMIN — ONDANSETRON 4 MILLIGRAM(S): 8 TABLET, FILM COATED ORAL at 22:32

## 2018-03-05 NOTE — ED PROVIDER NOTE - PHYSICAL EXAMINATION
CONSTITUTIONAL: ill appearing obese female lying in bed with eyes closed  HEAD: Normocephalic; atraumatic.   EYES: PERRL; EOM intact; conjunctiva and sclera clear  ENT: normal nose; no rhinorrhea; normal pharynx with no erythema or lesions.   NECK: Supple; non-tender; no LAD  CARDIOVASCULAR: Normal S1, S2; no murmurs, rubs, or gallops. Regular rate and rhythm.   RESPIRATORY: Breathing easily; breath sounds clear and equal bilaterally; no wheezes, rhonchi, or rales.  GI: Soft, large left abdominal fullness noted, +bowel sounds, no rebound, rigidity, or guarding. Ex-lap incision with granulation tissue, +blood purulent drainage from lower abdomen with induration, +tenderness around lower abdomen near drainage site   MSK: FROM at all extremities, normal tone   EXT: No cyanosis or edema; N/V intact  SKIN: Normal for age and race; warm; dry; good turgor; no apparent lesions or rash.   NEURO: A & O x 3; face symmetric; grossly unremarkable.   PSYCHOLOGICAL: The patient’s mood and manner are appropriate.

## 2018-03-05 NOTE — CONSULT NOTE ADULT - ASSESSMENT
57yoF with complicated surgical history involving management of ECFs, presents with LLQ abdominal wall abscess    Wound probed bedside, evaluated for any undermining tracts. Moderate volume of seropurulent fluid expressed with manipulation and probing (roughly 20cc). Wound then packed with dry packing ribbon. No incisions required.    Patient cleared for discharge back to rehab where daily wound care should continue with dry packing until wound heals.

## 2018-03-05 NOTE — ED PROVIDER NOTE - MEDICAL DECISION MAKING DETAILS
57F with a h/o gastric bypass surgery in 2012, h/o enterocutaneous fistula, s/p Ex-lap on 1/20 with resection of EC fistula, SBRx2, cholecystostomy tube sent from rehab for elevated wbcs and bleeding from abdomen today for likely abscess. Temp 99.3. Large left abdominal fullness noted, +bowel sounds, no rebound, rigidity, or guarding. Ex-lap incision with granulation tissue, +blood purulent drainage from lower abdomen with induration, +tenderness around lower abdomen near drainage site. r/o abscess. Surgery consult, labs.

## 2018-03-05 NOTE — ED PROVIDER NOTE - ATTENDING CONTRIBUTION TO CARE
57F with hx of gastric bypass surg, h/o enterocutaneous fistula, ex-lab with resection of EC fistula, SBR, cholecystostomy tube. Sent in for rehab for abscess from abd. Pt with elevated WBC count, appreciate surgical consult for drainage. As per surgery, pt can be discharged back to rehab.

## 2018-03-05 NOTE — ED ADULT NURSE NOTE - OBJECTIVE STATEMENT
Pt spouse states "they say she has and abscess and it's draining." Note from rehab states "increased wbc from 12.02 now 14.11 with sporadic temps. suspect LLQ abd abscess". Pt noted with multiple open wounds to abd. Also has feeding tube in place.

## 2018-03-05 NOTE — ED PROVIDER NOTE - OBJECTIVE STATEMENT
57F with a h/o gastric bypass surgery in 2012, h/o enterocutaneous fistula, s/p Ex-lap on 1/20 with resection of EC fistula, SBRx2, cholecystostomy tube sent from rehab for elevated wbcs and bleeding from abdomen today for likely abscess. Reports temp of 100.2 yesterday. Pt admits to nausea but no vomiting. Denies abd pain, chills, cp, sob, back pain, diarrhea. Last BM was earlier today and was normal. Pt had another abdominal abscess drained a few days ago and was sent back to rehab.

## 2018-03-05 NOTE — ED ADULT NURSE NOTE - PMH
Diverticulitis    DVT (deep venous thrombosis)  2013 neck  Fistula of intestine    Hypertension    Obesity    Reflux    Washed out

## 2018-03-09 ENCOUNTER — APPOINTMENT (OUTPATIENT)
Dept: BARIATRICS | Facility: CLINIC | Age: 58
End: 2018-03-09
Payer: COMMERCIAL

## 2018-03-09 VITALS
DIASTOLIC BLOOD PRESSURE: 70 MMHG | TEMPERATURE: 97 F | HEART RATE: 93 BPM | WEIGHT: 166 LBS | BODY MASS INDEX: 28.34 KG/M2 | SYSTOLIC BLOOD PRESSURE: 107 MMHG | HEIGHT: 64 IN

## 2018-03-09 PROCEDURE — 99024 POSTOP FOLLOW-UP VISIT: CPT

## 2018-03-10 ENCOUNTER — EMERGENCY (EMERGENCY)
Facility: HOSPITAL | Age: 58
LOS: 1 days | Discharge: HOME | End: 2018-03-10
Admitting: EMERGENCY MEDICINE
Payer: COMMERCIAL

## 2018-03-10 ENCOUNTER — INPATIENT (INPATIENT)
Facility: HOSPITAL | Age: 58
LOS: 5 days | Discharge: NURSING, REHAB OR LONG-TERM CARE | DRG: 862 | End: 2018-03-16
Attending: SURGERY | Admitting: SURGERY
Payer: COMMERCIAL

## 2018-03-10 VITALS
SYSTOLIC BLOOD PRESSURE: 107 MMHG | HEART RATE: 103 BPM | RESPIRATION RATE: 18 BRPM | DIASTOLIC BLOOD PRESSURE: 73 MMHG | TEMPERATURE: 100 F | WEIGHT: 164.91 LBS | HEIGHT: 63 IN | OXYGEN SATURATION: 99 %

## 2018-03-10 VITALS
OXYGEN SATURATION: 98 % | RESPIRATION RATE: 16 BRPM | DIASTOLIC BLOOD PRESSURE: 82 MMHG | SYSTOLIC BLOOD PRESSURE: 123 MMHG | HEART RATE: 86 BPM | TEMPERATURE: 99 F

## 2018-03-10 VITALS
OXYGEN SATURATION: 95 % | SYSTOLIC BLOOD PRESSURE: 117 MMHG | WEIGHT: 164.91 LBS | RESPIRATION RATE: 16 BRPM | HEIGHT: 63 IN | HEART RATE: 89 BPM | TEMPERATURE: 99 F | DIASTOLIC BLOOD PRESSURE: 83 MMHG

## 2018-03-10 DIAGNOSIS — Z41.9 ENCOUNTER FOR PROCEDURE FOR PURPOSES OTHER THAN REMEDYING HEALTH STATE, UNSPECIFIED: Chronic | ICD-10-CM

## 2018-03-10 DIAGNOSIS — Z98.84 BARIATRIC SURGERY STATUS: Chronic | ICD-10-CM

## 2018-03-10 LAB
ANION GAP SERPL CALC-SCNC: 12 MMOL/L — SIGNIFICANT CHANGE UP (ref 5–17)
APTT BLD: 30.4 SEC — SIGNIFICANT CHANGE UP (ref 27.5–37.4)
BUN SERPL-MCNC: 21 MG/DL — SIGNIFICANT CHANGE UP (ref 7–23)
CALCIUM SERPL-MCNC: 8.6 MG/DL — SIGNIFICANT CHANGE UP (ref 8.4–10.5)
CHLORIDE SERPL-SCNC: 106 MMOL/L — SIGNIFICANT CHANGE UP (ref 96–108)
CO2 SERPL-SCNC: 21 MMOL/L — LOW (ref 22–31)
CREAT SERPL-MCNC: 1 MG/DL — SIGNIFICANT CHANGE UP (ref 0.5–1.3)
GLUCOSE SERPL-MCNC: 94 MG/DL — SIGNIFICANT CHANGE UP (ref 70–99)
GRAM STN FLD: SIGNIFICANT CHANGE UP
HCT VFR BLD CALC: 26 % — LOW (ref 34.5–45)
HGB BLD-MCNC: 7.8 G/DL — LOW (ref 11.5–15.5)
INR BLD: 1.31 — HIGH (ref 0.88–1.16)
MCHC RBC-ENTMCNC: 27.5 PG — SIGNIFICANT CHANGE UP (ref 27–34)
MCHC RBC-ENTMCNC: 30 G/DL — LOW (ref 32–36)
MCV RBC AUTO: 91.5 FL — SIGNIFICANT CHANGE UP (ref 80–100)
PLATELET # BLD AUTO: 358 K/UL — SIGNIFICANT CHANGE UP (ref 150–400)
POTASSIUM SERPL-MCNC: 3.4 MMOL/L — LOW (ref 3.5–5.3)
POTASSIUM SERPL-SCNC: 3.4 MMOL/L — LOW (ref 3.5–5.3)
PROTHROM AB SERPL-ACNC: 14.6 SEC — HIGH (ref 9.8–12.7)
RBC # BLD: 2.84 M/UL — LOW (ref 3.8–5.2)
RBC # FLD: 16.5 % — SIGNIFICANT CHANGE UP (ref 10.3–16.9)
SODIUM SERPL-SCNC: 139 MMOL/L — SIGNIFICANT CHANGE UP (ref 135–145)
SPECIMEN SOURCE: SIGNIFICANT CHANGE UP
WBC # BLD: 9.6 K/UL — SIGNIFICANT CHANGE UP (ref 3.8–10.5)
WBC # FLD AUTO: 9.6 K/UL — SIGNIFICANT CHANGE UP (ref 3.8–10.5)

## 2018-03-10 PROCEDURE — 87186 SC STD MICRODIL/AGAR DIL: CPT

## 2018-03-10 PROCEDURE — 87205 SMEAR GRAM STAIN: CPT

## 2018-03-10 PROCEDURE — 99284 EMERGENCY DEPT VISIT MOD MDM: CPT

## 2018-03-10 PROCEDURE — 99283 EMERGENCY DEPT VISIT LOW MDM: CPT

## 2018-03-10 PROCEDURE — 87070 CULTURE OTHR SPECIMN AEROBIC: CPT

## 2018-03-10 PROCEDURE — 74176 CT ABD & PELVIS W/O CONTRAST: CPT | Mod: 26

## 2018-03-10 PROCEDURE — 87075 CULTR BACTERIA EXCEPT BLOOD: CPT

## 2018-03-10 PROCEDURE — 99283 EMERGENCY DEPT VISIT LOW MDM: CPT | Mod: 25

## 2018-03-10 RX ORDER — SODIUM CHLORIDE 9 MG/ML
1000 INJECTION, SOLUTION INTRAVENOUS
Qty: 0 | Refills: 0 | Status: DISCONTINUED | OUTPATIENT
Start: 2018-03-10 | End: 2018-03-11

## 2018-03-10 RX ORDER — ESCITALOPRAM OXALATE 10 MG/1
20 TABLET, FILM COATED ORAL DAILY
Qty: 0 | Refills: 0 | Status: DISCONTINUED | OUTPATIENT
Start: 2018-03-10 | End: 2018-03-16

## 2018-03-10 RX ORDER — ACETAMINOPHEN 500 MG
650 TABLET ORAL ONCE
Qty: 0 | Refills: 0 | Status: COMPLETED | OUTPATIENT
Start: 2018-03-10 | End: 2018-03-10

## 2018-03-10 RX ORDER — SODIUM CHLORIDE 9 MG/ML
1000 INJECTION INTRAMUSCULAR; INTRAVENOUS; SUBCUTANEOUS ONCE
Qty: 0 | Refills: 0 | Status: COMPLETED | OUTPATIENT
Start: 2018-03-10 | End: 2018-03-10

## 2018-03-10 RX ORDER — SODIUM CHLORIDE 9 MG/ML
1000 INJECTION INTRAMUSCULAR; INTRAVENOUS; SUBCUTANEOUS
Qty: 0 | Refills: 0 | Status: DISCONTINUED | OUTPATIENT
Start: 2018-03-10 | End: 2018-03-12

## 2018-03-10 RX ORDER — MIRTAZAPINE 45 MG/1
7.5 TABLET, ORALLY DISINTEGRATING ORAL AT BEDTIME
Qty: 0 | Refills: 0 | Status: DISCONTINUED | OUTPATIENT
Start: 2018-03-10 | End: 2018-03-16

## 2018-03-10 RX ORDER — ONDANSETRON 8 MG/1
4 TABLET, FILM COATED ORAL EVERY 6 HOURS
Qty: 0 | Refills: 0 | Status: DISCONTINUED | OUTPATIENT
Start: 2018-03-10 | End: 2018-03-16

## 2018-03-10 RX ORDER — LOSARTAN POTASSIUM 100 MG/1
50 TABLET, FILM COATED ORAL DAILY
Qty: 0 | Refills: 0 | Status: DISCONTINUED | OUTPATIENT
Start: 2018-03-10 | End: 2018-03-16

## 2018-03-10 RX ADMIN — SODIUM CHLORIDE 2000 MILLILITER(S): 9 INJECTION INTRAMUSCULAR; INTRAVENOUS; SUBCUTANEOUS at 19:12

## 2018-03-10 RX ADMIN — Medication 650 MILLIGRAM(S): at 19:11

## 2018-03-10 RX ADMIN — MIRTAZAPINE 7.5 MILLIGRAM(S): 45 TABLET, ORALLY DISINTEGRATING ORAL at 23:54

## 2018-03-10 RX ADMIN — ESCITALOPRAM OXALATE 20 MILLIGRAM(S): 10 TABLET, FILM COATED ORAL at 23:22

## 2018-03-10 RX ADMIN — Medication 100 MILLIGRAM(S): at 23:54

## 2018-03-10 RX ADMIN — SODIUM CHLORIDE 125 MILLILITER(S): 9 INJECTION INTRAMUSCULAR; INTRAVENOUS; SUBCUTANEOUS at 23:22

## 2018-03-10 NOTE — H&P ADULT - HISTORY OF PRESENT ILLNESS
57 yo female w/PMHx of HTN, gastric bypass in 2002 complicated by stricture, corkscrewed sleeve and chronic leak, revised on 11/28/16 with protracted (70-day) postoperative course complicated by MDR infections, a C-diff infection, respiratory compromise due to plugging/PNA/effusions requiring multiple interventions and a trach, as well as a continued leak requiring a draining (double-pigtail) with stenting performed by GI. Patient had longstanding enterocutaneous fistula which she presented for resection of. She underwent a long prolonged hospital course including multiple SICU admissions and the following procedures. 7/24: SBR resection, fistula resection, revision of esophagogegunostomy drain through esophageal hiatus in the right mediastinum. 7/29: Ex lap, SB anastomosis in BP limb breakdown with succus throughout abdomen. 8/1: Abdominal washout, drainage of abscess, biologic mesh placement, closure of abdominal wall, vac and retention suture. 8/7: Abdominal washout, mesh removal frozen abdomen noted, LLQ CHECO placement with benson drain. 8/10: Leak noted from previous anastomosis site on left side, mid abdomen malecot drain placed in enterotomy, 2 JPs placed, necrotic fascia debrided, vicryl mesh sutured to fascia circumferentially, xeroform over mesh then vac dressing placed. 1/30: Ex-lap, resection of EC fistula, SBR x2 with patino hand sewn anastomosis, placement of 2 JPs (SQ) placement, primary abdominal closure with b/l external oblique release and prevena placement. Since discharge after this surgery she has had a left lower quadrant abdominal collection that has been draining, with multiple ED and office visits. Despite packing/drainage attempts this has persisted and she presents for this issue.

## 2018-03-10 NOTE — H&P ADULT - NSHPLABSRESULTS_GEN_ALL_CORE
7.8    9.6   )-----------( 358      ( 10 Mar 2018 20:01 )             26.0   10 Mar 2018 20:00    139    |  106    |  21     ----------------------------<  94     3.4     |  21     |  1.00     Ca    8.6        10 Mar 2018 20:00    PT/INR - ( 10 Mar 2018 20:01 )   PT: 14.6 sec;   INR: 1.31          PTT - ( 10 Mar 2018 20:01 )  PTT:30.4 sec          EXAM:  CT ABDOMEN AND PELVIS                          PROCEDURE DATE:  03/10/2018                     Trace bilateral pleural effusions with mild interval decrease in size   from the prior study. There are with overlying consolidation left greater   than right. Bibasilar pneumonias are not excluded. Clinical correlation   is recommended. Cardiomegaly. No pericardial effusion.    Diffuse hepatic steatosis. No hepatomegaly. The gallbladder images of   radiopaque calculi however there is mild stranding of the fat surrounding   the gallbladder which is nonspecific.    There is trace intra-abdominal ascites the spleen, pancreas and bilateral   adrenal glands are grossly unremarkable.    No right renal hydronephrosis or obstructive right renal/ureteric ocular.    Nonspecific mild left renal hydronephrosis. There is no evidence of   obstructing left renal or left ureter stone. Moderately distended urinary   bladder.    Prior gastric bypass changes. No evidence of small or large bowel   obstruction. Anastomotic sutures within the region of the sigmoid colon.   There is an area of small bowel anastomosis within the anterior aspect of   the left hemiabdomen adjacent to the abdominal wall. There appears to be   a focal defect through the abdominal wall within this region with   possible communication with the adjacent small bowel loop, (series 3   images [id number]).    No intraperitoneal free air. Trace abdominal ascites.    Within the subcutaneous fat overlying the left lateral abdominal wall is   a new large fluid collection overlying the left lateral abdomen measuring   measuring 14.2 x 5.0 cm. There is a smaller fluid collection within the   subcutaneous fat overlying the lower ventral abdomen which measures 5.0 x   2.6 cm, appearing slightly larger than on the prior study. 2.6 x 4.5 cm    Chronic appearing fracture involving the left superior pubic rami. Grade   1 anterolisthesis of L4 on L5.    IMPRESSION:  New large fluid collection overlying the left lateral abdomen. Slight   interval increase in size of the smaller fluid collection overlying the   midline ventral abdomen. There is an an area of small bowel anastomosis   within the anterior aspect of the left hemiabdomen adjacent to the   abdominal wall. There appears to be a focal defect through the abdominal   wall within this region with possible communication with the adjacent   small bowel loop, (series 3 images 53-57).    Trace bilateral pleural effusions with mild interval decrease in size   from the prior study. There are with overlying consolidation left greater   than right. Bibasilar pneumonias are not excluded. Clinical correlation   is recommended.    Nonspecific mild left renal hydronephrosis. There is no evidence of   obstructing left renal or left ureter stone.

## 2018-03-10 NOTE — ED ADULT TRIAGE NOTE - CHIEF COMPLAINT QUOTE
"She had initially gastric sleeve surgery in 7/2012 at [facility]. But she had complications and had another surgery here on 1/30/18. She has pain and went to [facility] today and was discharged within 30min. We were told that she has abscess at the site and she needs to come back here."

## 2018-03-10 NOTE — ED PROVIDER NOTE - MEDICAL DECISION MAKING DETAILS
Pt sent from [facility] for eval by surg for draining wound w h/o recent surg for fistula.  Pt w low grade temp, hr 103 but does not meet sirs criteria at this time; await labs.  Plan labs, ct, surg consult; likely admit.

## 2018-03-10 NOTE — ED PROVIDER NOTE - GASTROINTESTINAL, MLM
Abdomen soft, non-tender, ?able crepitus L mid and lower abd, incision midline w small dehiscence at suture puncture sites, incision ruq w bandage wet w green drainage w/o active drainage, L mid abd w open wound w bloody drainage, no r/g

## 2018-03-10 NOTE — ED PROVIDER NOTE - SKIN, MLM
0.5cm incisional site on the L outside of abdomen that expresses copious amounts of blood and pus when compressed.

## 2018-03-10 NOTE — ED ADULT NURSE NOTE - GASTROINTESTINAL WDL
Abdomen soft, nontender, nondistended, bowel sounds present in all 4 quadrants. Multiple clean, dry, and intact dressings noted for abdomen

## 2018-03-10 NOTE — ED PROVIDER NOTE - PROGRESS NOTE DETAILS
Pt discussed w surg who want ct abd noncontrast and will be down to eval. Per surg, abscess vs fistula; tba for poss IR drainage. Per surg, abscess vs fistula; tba for poss IR drainage.  CT w ?able pna but pt denies uri sx, cough, sob; lung cta on exam.  No clinical concern for pna at this time.

## 2018-03-10 NOTE — ED ADULT NURSE NOTE - CHIEF COMPLAINT QUOTE
"She had initially gastric sleeve surgery in 7/2012 at [facility]. But she had complications and had another surgery here on 1/30/18. She has pain and went to [facility] today and was discharged within 30min. We were told that she has abscess at the site and she needs to come back here."

## 2018-03-10 NOTE — ED PROVIDER NOTE - GASTROINTESTINAL, MLM
large vertical incision, drainage in the gallbladder area which is green from bile drainage. Tegaderm overlapping the lower abdomen because of recent abd surgery.

## 2018-03-10 NOTE — ED ADULT NURSE NOTE - OBJECTIVE STATEMENT
56 y/o female w/ hx of gastric bypass surgery in 2012, status post ex-lap on 1/20 for resection of enterocutaneous fistula, presents to ED for drainage of abscess. Patient  was evaluated at [place] and discharged to come to [facility] for evaluation by surgical team. Patient was seen by Dr. Brady yesterday, but noticed increased drainage today. Denies fever, chills, chest pain, SOB, cough, abdominal pain, nausea, vomiting, diarrhea, hematuria, dysuria, and any other associated symptoms.

## 2018-03-10 NOTE — H&P ADULT - NSHPPHYSICALEXAM_GEN_ALL_CORE
Alert and conversant in NAD  Abdomen with draining wound in LLQ draining pus and blood, inferior to this there is a palpable collection. Abdomen otherwise soft, nontender, nondistended.

## 2018-03-10 NOTE — ED PROVIDER NOTE - OBJECTIVE STATEMENT
56 y/o F morbidly obese pt s/p gastric surgery 1 month ago, had an abdominal wall abscess that was drained yesterday by Dr. Brady and today began having more bleeding and pus draining from the incision site. Pt was sent here for evaluation.

## 2018-03-10 NOTE — ED PROVIDER NOTE - MEDICAL DECISION MAKING DETAILS
Spoke to Dr. Miguel Brady, pt's surgeon at [facility], who agreed to accept her back at [facility] and she will go with her  by car to meet him there for further treatment. abd wound re-bandaged.

## 2018-03-10 NOTE — ED ADULT NURSE REASSESSMENT NOTE - NS ED NURSE REASSESS COMMENT FT1
pt re evaluated by md and to be d'c/d  pt discharged stable and ambulatory in nad at present d/c instruction reinforced and pt verbalized understanding vital signs as charted dr bailey spoke with surgeon and pt to go to [facility] don't want to be transferred spouse wants to drive pt there  cleaned and changed and dsd to area spouse advised to go straight to [facility]

## 2018-03-10 NOTE — H&P ADULT - ASSESSMENT
56 yo F with above complicated history presents with draining collection. Plan to admit and IR intervention.     Patient discussed with Dr. Brady. Will continue outpatient clinda.

## 2018-03-11 ENCOUNTER — TRANSCRIPTION ENCOUNTER (OUTPATIENT)
Age: 58
End: 2018-03-11

## 2018-03-11 PROCEDURE — 49406 IMAGE CATH FLUID PERI/RETRO: CPT

## 2018-03-11 RX ORDER — DIAZEPAM 5 MG
20 TABLET ORAL
Qty: 0 | Refills: 0 | COMMUNITY

## 2018-03-11 RX ORDER — DIPHENHYDRAMINE HCL 50 MG
25 CAPSULE ORAL AT BEDTIME
Qty: 0 | Refills: 0 | Status: DISCONTINUED | OUTPATIENT
Start: 2018-03-11 | End: 2018-03-16

## 2018-03-11 RX ORDER — DIAZEPAM 5 MG
5 TABLET ORAL AT BEDTIME
Qty: 0 | Refills: 0 | Status: DISCONTINUED | OUTPATIENT
Start: 2018-03-11 | End: 2018-03-16

## 2018-03-11 RX ORDER — POTASSIUM CHLORIDE 20 MEQ
10 PACKET (EA) ORAL
Qty: 0 | Refills: 0 | Status: COMPLETED | OUTPATIENT
Start: 2018-03-11 | End: 2018-03-11

## 2018-03-11 RX ORDER — HEPARIN SODIUM 5000 [USP'U]/ML
5000 INJECTION INTRAVENOUS; SUBCUTANEOUS EVERY 8 HOURS
Qty: 0 | Refills: 0 | Status: DISCONTINUED | OUTPATIENT
Start: 2018-03-11 | End: 2018-03-16

## 2018-03-11 RX ORDER — LOSARTAN POTASSIUM 100 MG/1
1 TABLET, FILM COATED ORAL
Qty: 0 | Refills: 0 | COMMUNITY

## 2018-03-11 RX ADMIN — Medication 25 MILLIGRAM(S): at 21:57

## 2018-03-11 RX ADMIN — Medication 100 MILLIEQUIVALENT(S): at 12:28

## 2018-03-11 RX ADMIN — Medication 100 MILLIGRAM(S): at 16:23

## 2018-03-11 RX ADMIN — Medication 100 MILLIEQUIVALENT(S): at 11:30

## 2018-03-11 RX ADMIN — ESCITALOPRAM OXALATE 20 MILLIGRAM(S): 10 TABLET, FILM COATED ORAL at 16:22

## 2018-03-11 RX ADMIN — Medication 100 MILLIGRAM(S): at 23:06

## 2018-03-11 RX ADMIN — Medication 100 MILLIEQUIVALENT(S): at 13:33

## 2018-03-11 RX ADMIN — HEPARIN SODIUM 5000 UNIT(S): 5000 INJECTION INTRAVENOUS; SUBCUTANEOUS at 21:43

## 2018-03-11 RX ADMIN — Medication 100 MILLIGRAM(S): at 06:22

## 2018-03-11 RX ADMIN — MIRTAZAPINE 7.5 MILLIGRAM(S): 45 TABLET, ORALLY DISINTEGRATING ORAL at 21:43

## 2018-03-11 RX ADMIN — Medication 5 MILLIGRAM(S): at 21:42

## 2018-03-11 NOTE — DISCHARGE NOTE ADULT - MEDICATION SUMMARY - MEDICATIONS TO STOP TAKING
I will STOP taking the medications listed below when I get home from the hospital:  None I will STOP taking the medications listed below when I get home from the hospital:    Lexapro 20 mg oral tablet  -- 1 tab(s) by mouth once a day

## 2018-03-11 NOTE — PROGRESS NOTE ADULT - SUBJECTIVE AND OBJECTIVE BOX
3/11: admitted overnight with abdominal abscess, plan for IR drainage in AM        SUBJECTIVE: Patient seen and examined bedside by chief resident and surgical team. Patient denies n/v/cp/sob.    clindamycin IVPB      clindamycin IVPB 600 milliGRAM(s) IV Intermittent every 8 hours  losartan 50 milliGRAM(s) Oral daily      Vital Signs Last 24 Hrs  T(C): 37.2 (11 Mar 2018 08:55), Max: 38.2 (10 Mar 2018 19:54)  T(F): 98.9 (11 Mar 2018 08:55), Max: 100.8 (10 Mar 2018 19:54)  HR: 73 (11 Mar 2018 08:55) (71 - 103)  BP: 115/65 (11 Mar 2018 08:55) (97/67 - 123/82)  BP(mean): --  RR: 16 (11 Mar 2018 08:55) (16 - 19)  SpO2: 94% (11 Mar 2018 08:55) (93% - 99%)  I&O's Detail    10 Mar 2018 06:01  -  11 Mar 2018 07:00  --------------------------------------------------------  IN:    sodium chloride 0.9%.: 1000 mL  Total IN: 1000 mL    OUT:  Total OUT: 0 mL    Total NET: 1000 mL      General: NAD, resting comfortably in bed  C/V: NSR  Pulm: Nonlabored breathing, no respiratory distress  Abd: soft, slight tender, non-distended, area of abscess is draining into abdominal pads and actively bleeding with pus  Extrem: WWP, no edema, SCDs in place        LABS:                        7.8    9.6   )-----------( 358      ( 10 Mar 2018 20:01 )             26.0     03-10    139  |  106  |  21  ----------------------------<  94  3.4<L>   |  21<L>  |  1.00    Ca    8.6      10 Mar 2018 20:00      PT/INR - ( 10 Mar 2018 20:01 )   PT: 14.6 sec;   INR: 1.31          PTT - ( 10 Mar 2018 20:01 )  PTT:30.4 sec      RADIOLOGY & ADDITIONAL STUDIES:

## 2018-03-11 NOTE — DISCHARGE NOTE ADULT - HOSPITAL COURSE
55 yo female w/PMHx of HTN, gastric bypass in 2002 complicated by stricture and with extensive history admitted with left lower quadrant abdominal abscess that has been draining. IR drained 20cc of fluid today from the area and a drain was placed for collection. Today patient is feeling well, all the VS and blood work is within normal limits, the pain is well controlled, tolerating diet without nausea, and ambulating  - patient is stable and ready for discharge today.

## 2018-03-11 NOTE — DISCHARGE NOTE ADULT - PLAN OF CARE
to recovery Follow up with Dr. Brady in 2 week. Call the office at  to schedule your appointment. You may shower; soap and water over incision sites. Do not scrub. Pat dry when done. No tub bathing or swimming until cleared. Keep incision sites out of the sun as scars will darken. No heavy lifting (>10lbs) or strenuous exercise. Diet: Bariatric Full Fluids. 60 grams protein daily.  64 fluid ounces water daily. Drink small sips throughout the day. Continue diet as outlined by paperwork received as a pre-operative patient. You should be urinating at least 3-4x per day. Call the office if you experience increasing abdominal pain, nausea, vomiting, or temperature >100.4F.  NO ASPIRIN OR NSAIDs until approved by Dr. Brady. Avoid alcoholic beverages until cleared by Dr. Brady. Antibiotics Patient will need to be on antibiotics:    1) linezolid - 600 mg PO q12 hours for 14 days   2) Cipro - 750 mg PO q12 hours for 14 days CHECO drain placed by RAFIQ The catheter should be monitored for output every 24 hours. If the   catheter should be flushed with 5 cc of sterile saline every 24 hours. Wound care Please pack anterior abdominal drain wounds x2 with wet to dry dressing. Using sterile saline, please moisten sterile gauze. Using cotton tip applicator, please pack wounds with the moistened gauze. Please cover with sterile gauze and secure with paper tape. Replace wet to dry dressing daily. Patient will need to be on antibiotics:  1) Ertapenem IV - 1000 mg PO qdaily for total of 14 days (until March 29)  2) Linezolid - 600 mg PO q12 hours for total of 14 days (until March 27) The catheter should be monitored for output every 24 hours. The   catheter should be flushed with 5 cc of sterile saline every 24 hours.   Patient should follow up with IR next week for possible CT and removal of drain when <10 mL output for 2 consecutive days. Recovery Patient will need to be on antibiotics:  1) Tobramycin IV - 75 mg every 8 hours for total of 10 days (until March 26)  2) Linezolid - 600 mg PO every 12 hours for total of 10 days (until March 26)    Patient will need Tobramycin trough and BMP (to check Cr) blood labs in one week (3/23). Please send results to Dr. Brady's office. You may fax the results to [phone]. The catheter should be monitored for output every 24 hours. The   catheter should be flushed with 5 cc of sterile saline every 24 hours.   Patient needs to follow up with IR in one week for CT of abdomen and pelvis and possible removal of drain when <10 mL output for 2 consecutive days. Please record 24 hour output from CHECO drain every day.

## 2018-03-11 NOTE — DISCHARGE NOTE ADULT - CARE PLAN
Principal Discharge DX:	Abscess  Goal:	to recovery  Assessment and plan of treatment:	Follow up with Dr. Brady in 2 week. Call the office at  to schedule your appointment. You may shower; soap and water over incision sites. Do not scrub. Pat dry when done. No tub bathing or swimming until cleared. Keep incision sites out of the sun as scars will darken. No heavy lifting (>10lbs) or strenuous exercise. Diet: Bariatric Full Fluids. 60 grams protein daily.  64 fluid ounces water daily. Drink small sips throughout the day. Continue diet as outlined by paperwork received as a pre-operative patient. You should be urinating at least 3-4x per day. Call the office if you experience increasing abdominal pain, nausea, vomiting, or temperature >100.4F.  NO ASPIRIN OR NSAIDs until approved by Dr. Brady. Avoid alcoholic beverages until cleared by Dr. Brady. Principal Discharge DX:	Abscess  Goal:	to recovery  Assessment and plan of treatment:	Follow up with Dr. Brady in 2 week. Call the office at  to schedule your appointment. You may shower; soap and water over incision sites. Do not scrub. Pat dry when done. No tub bathing or swimming until cleared. Keep incision sites out of the sun as scars will darken. No heavy lifting (>10lbs) or strenuous exercise. Diet: Bariatric Full Fluids. 60 grams protein daily.  64 fluid ounces water daily. Drink small sips throughout the day. Continue diet as outlined by paperwork received as a pre-operative patient. You should be urinating at least 3-4x per day. Call the office if you experience increasing abdominal pain, nausea, vomiting, or temperature >100.4F.  NO ASPIRIN OR NSAIDs until approved by Dr. Brady. Avoid alcoholic beverages until cleared by Dr. Brady.  Goal:	Antibiotics  Assessment and plan of treatment:	Patient will need to be on antibiotics:    1) linezolid - 600 mg PO q12 hours for 14 days   2) Cipro - 750 mg PO q12 hours for 14 days  Goal:	CHECO drain placed by IR  Assessment and plan of treatment:	The catheter should be monitored for output every 24 hours. If the   catheter should be flushed with 5 cc of sterile saline every 24 hours.  Goal:	Wound care  Assessment and plan of treatment:	Please pack anterior abdominal drain wounds x2 with wet to dry dressing. Using sterile saline, please moisten sterile gauze. Using cotton tip applicator, please pack wounds with the moistened gauze. Please cover with sterile gauze and secure with paper tape. Replace wet to dry dressing daily. Principal Discharge DX:	Abscess  Goal:	to recovery  Assessment and plan of treatment:	Follow up with Dr. Brady in 2 week. Call the office at  to schedule your appointment. You may shower; soap and water over incision sites. Do not scrub. Pat dry when done. No tub bathing or swimming until cleared. Keep incision sites out of the sun as scars will darken. No heavy lifting (>10lbs) or strenuous exercise. Diet: Bariatric Full Fluids. 60 grams protein daily.  64 fluid ounces water daily. Drink small sips throughout the day. Continue diet as outlined by paperwork received as a pre-operative patient. You should be urinating at least 3-4x per day. Call the office if you experience increasing abdominal pain, nausea, vomiting, or temperature >100.4F.  NO ASPIRIN OR NSAIDs until approved by Dr. Brady. Avoid alcoholic beverages until cleared by Dr. Brady.  Goal:	Antibiotics  Assessment and plan of treatment:	Patient will need to be on antibiotics:  1) Ertapenem IV - 1000 mg PO qdaily for total of 14 days (until March 29)  2) Linezolid - 600 mg PO q12 hours for total of 14 days (until March 27)  Goal:	CHECO drain placed by IR  Assessment and plan of treatment:	The catheter should be monitored for output every 24 hours. If the   catheter should be flushed with 5 cc of sterile saline every 24 hours.  Goal:	Wound care  Assessment and plan of treatment:	Please pack anterior abdominal drain wounds x2 with wet to dry dressing. Using sterile saline, please moisten sterile gauze. Using cotton tip applicator, please pack wounds with the moistened gauze. Please cover with sterile gauze and secure with paper tape. Replace wet to dry dressing daily. Principal Discharge DX:	Abscess  Goal:	to recovery  Assessment and plan of treatment:	Follow up with Dr. Brady in 2 week. Call the office at  to schedule your appointment. You may shower; soap and water over incision sites. Do not scrub. Pat dry when done. No tub bathing or swimming until cleared. Keep incision sites out of the sun as scars will darken. No heavy lifting (>10lbs) or strenuous exercise. Diet: Bariatric Full Fluids. 60 grams protein daily.  64 fluid ounces water daily. Drink small sips throughout the day. Continue diet as outlined by paperwork received as a pre-operative patient. You should be urinating at least 3-4x per day. Call the office if you experience increasing abdominal pain, nausea, vomiting, or temperature >100.4F.  NO ASPIRIN OR NSAIDs until approved by Dr. Brady. Avoid alcoholic beverages until cleared by Dr. Brady.  Goal:	Antibiotics  Assessment and plan of treatment:	Patient will need to be on antibiotics:  1) Ertapenem IV - 1000 mg PO qdaily for total of 14 days (until March 29)  2) Linezolid - 600 mg PO q12 hours for total of 14 days (until March 27)  Goal:	CHECO drain placed by IR  Assessment and plan of treatment:	The catheter should be monitored for output every 24 hours. The   catheter should be flushed with 5 cc of sterile saline every 24 hours.   Patient should follow up with IR next week for possible CT and removal of drain when <10 mL output for 2 consecutive days.  Goal:	Wound care  Assessment and plan of treatment:	Please pack anterior abdominal drain wounds x2 with wet to dry dressing. Using sterile saline, please moisten sterile gauze. Using cotton tip applicator, please pack wounds with the moistened gauze. Please cover with sterile gauze and secure with paper tape. Replace wet to dry dressing daily. Principal Discharge DX:	Abscess  Goal:	Recovery  Assessment and plan of treatment:	Follow up with Dr. Brady in 2 week. Call the office at  to schedule your appointment. You may shower; soap and water over incision sites. Do not scrub. Pat dry when done. No tub bathing or swimming until cleared. Keep incision sites out of the sun as scars will darken. No heavy lifting (>10lbs) or strenuous exercise. Diet: Bariatric Full Fluids. 60 grams protein daily.  64 fluid ounces water daily. Drink small sips throughout the day. Continue diet as outlined by paperwork received as a pre-operative patient. You should be urinating at least 3-4x per day. Call the office if you experience increasing abdominal pain, nausea, vomiting, or temperature >100.4F.  NO ASPIRIN OR NSAIDs until approved by Dr. Brady. Avoid alcoholic beverages until cleared by Dr. Brady.  Goal:	Antibiotics  Assessment and plan of treatment:	Patient will need to be on antibiotics:  1) Tobramycin IV - 75 mg every 8 hours for total of 10 days (until March 26)  2) Linezolid - 600 mg PO every 12 hours for total of 10 days (until March 26)    Patient will need Tobramycin trough and BMP (to check Cr) blood labs in one week (3/23). Please send results to Dr. Brady's office. You may fax the results to [phone].  Goal:	CHECO drain placed by IR  Assessment and plan of treatment:	The catheter should be monitored for output every 24 hours. The   catheter should be flushed with 5 cc of sterile saline every 24 hours.   Patient needs to follow up with IR in one week for CT of abdomen and pelvis and possible removal of drain when <10 mL output for 2 consecutive days. Please record 24 hour output from CHECO drain every day.  Goal:	Wound care  Assessment and plan of treatment:	Please pack anterior abdominal drain wounds x2 with wet to dry dressing. Using sterile saline, please moisten sterile gauze. Using cotton tip applicator, please pack wounds with the moistened gauze. Please cover with sterile gauze and secure with paper tape. Replace wet to dry dressing daily.

## 2018-03-11 NOTE — PROGRESS NOTE ADULT - ASSESSMENT
57 yo female w/PMHx of HTN, gastric bypass in 2002 complicated by stricture with extensive history admitted with left lower quadrant abdominal abscess that has been draining    pain/nausea control  NPO/IVF  Consulted IR for drainage   SCDs/SQH  AM labs

## 2018-03-11 NOTE — DISCHARGE NOTE ADULT - PATIENT PORTAL LINK FT
You can access the [facility], offered by [facility], by registering with the following website: [email or web address]

## 2018-03-11 NOTE — DISCHARGE NOTE ADULT - FINDINGS/TREATMENT
Patient will take Augmentin 875 BID for 14 days and then will follow-up with Dr. Brady in office. Please clean Shamar Broussard drain with 5cc of normal saline daily.   How to empty the Shamar-Broussard drain: Empty the bulb when it is half full or every 8 to 12 hours. Wash your hands with soap and water.  Remove the plug from the bulb. Pour the fluid into a measuring cup. Clean the plug with an alcohol swab or a cotton ball dipped in rubbing alcohol.  Squeeze the bulb flat and put the plug back in. The bulb should stay flat until it starts to fill with fluid again.  Measure the amount of fluid you pour out. Write down how much fluid you empty from the CHECO drain and the date and time you collected it.   Flush the fluid down the toilet. Wash your hands. Contact your healthcare provider if: your drain less than 30 milliliters (2 tablespoons) in 24 hours. This may mean your drain can be removed. You suddenly stop draining fluid or think your CHECO drain is blocked. You have a fever higher than 101.5 F (38.6C). You have increased pain, redness, or swelling around the drain site. You have questions about your CHECO drain care.

## 2018-03-11 NOTE — DISCHARGE NOTE ADULT - MEDICATION SUMMARY - MEDICATIONS TO TAKE
I will START or STAY ON the medications listed below when I get home from the hospital:    diphenhydrAMINE 25 mg oral capsule  -- 1 cap(s) by mouth once a day (at bedtime), As needed, Insomnia  -- Indication: For home med    nystatin 100,000 units/g topical powder  -- 1 application on skin once a day  -- Indication: For home med    bisacodyl 5 mg oral delayed release tablet  -- 1 tab(s) by mouth 2 times a day  -- Indication: For home med    VESIcare 10 mg oral tablet  -- 1 tab(s) by mouth once a day  -- Indication: For home med    Multiple Vitamins oral tablet  -- 1 tab(s) by mouth once a day  -- Indication: For home med    Vitamin B-12 250 mcg oral tablet  -- 1 tab(s) by mouth once a day  -- Indication: For home med    cholecalciferol oral tablet  -- 2000 unit(s) by mouth once a day  -- Indication: For home med I will START or STAY ON the medications listed below when I get home from the hospital:    diphenhydrAMINE 25 mg oral capsule  -- 1 cap(s) by mouth once a day (at bedtime), As needed, Insomnia  -- Indication: For home med    nystatin 100,000 units/g topical powder  -- 1 application on skin once a day  -- Indication: For home med    bisacodyl 5 mg oral delayed release tablet  -- 1 tab(s) by mouth 2 times a day  -- Indication: For home med    linezolid 600 mg oral tablet  -- 1 tab(s) by mouth every 12 hours  -- Indication: For Antibiotics    ciprofloxacin 750 mg oral tablet  -- 1 tab(s) by mouth every 12 hours  -- Indication: For Antibiotics    VESIcare 10 mg oral tablet  -- 1 tab(s) by mouth once a day  -- Indication: For home med    Multiple Vitamins oral tablet  -- 1 tab(s) by mouth once a day  -- Indication: For home med    Vitamin B-12 250 mcg oral tablet  -- 1 tab(s) by mouth once a day  -- Indication: For home med    cholecalciferol oral tablet  -- 2000 unit(s) by mouth once a day  -- Indication: For home med I will START or STAY ON the medications listed below when I get home from the hospital:    calcium carbonate 1250 mg (500 mg elemental calcium) oral tablet  -- 1 tab(s) by mouth once a day  -- Indication: For home med    diphenhydrAMINE 25 mg oral capsule  -- 1 cap(s) by mouth once a day (at bedtime), As needed, Insomnia  -- Indication: For home med    ertapenem 1 g injection  --  injectable   -- Indication: For Antibiotic    nystatin 100,000 units/g topical powder  -- 1 application on skin once a day  -- Indication: For home med    bisacodyl 5 mg oral delayed release tablet  -- 1 tab(s) by mouth 2 times a day  -- Indication: For home med    linezolid 600 mg oral tablet  -- 1 tab(s) by mouth every 12 hours  -- Indication: For Antibiotics    VESIcare 10 mg oral tablet  -- 1 tab(s) by mouth once a day  -- Indication: For home med    Multiple Vitamins oral tablet  -- 1 tab(s) by mouth once a day  -- Indication: For home med    Vitamin B-12 250 mcg oral tablet  -- 1 tab(s) by mouth once a day  -- Indication: For home med    cholecalciferol oral tablet  -- 2000 unit(s) by mouth once a day  -- Indication: For home med I will START or STAY ON the medications listed below when I get home from the hospital:    tobramycin  -- Indication: For Antibiotics    calcium carbonate 1250 mg (500 mg elemental calcium) oral tablet  -- 1 tab(s) by mouth once a day  -- Indication: For home med    diphenhydrAMINE 25 mg oral capsule  -- 1 cap(s) by mouth once a day (at bedtime), As needed, Insomnia  -- Indication: For home med    nystatin 100,000 units/g topical powder  -- 1 application on skin once a day  -- Indication: For home med    bisacodyl 5 mg oral delayed release tablet  -- 1 tab(s) by mouth 2 times a day  -- Indication: For home med    linezolid 600 mg oral tablet  -- 1 tab(s) by mouth every 12 hours  -- Indication: For Antibiotics    VESIcare 10 mg oral tablet  -- 1 tab(s) by mouth once a day  -- Indication: For home med    Multiple Vitamins oral tablet  -- 1 tab(s) by mouth once a day  -- Indication: For home med    Vitamin B-12 250 mcg oral tablet  -- 1 tab(s) by mouth once a day  -- Indication: For home med    cholecalciferol oral tablet  -- 2000 unit(s) by mouth once a day  -- Indication: For home med

## 2018-03-11 NOTE — DISCHARGE NOTE ADULT - CARE PROVIDER_API CALL
Miguel Brady (MD), Surgery  [address] [place] [address]  Phone: [phone]  Fax: [phone]

## 2018-03-12 LAB
-  AMIKACIN: SIGNIFICANT CHANGE UP
-  AMPICILLIN/SULBACTAM: SIGNIFICANT CHANGE UP
-  AMPICILLIN: SIGNIFICANT CHANGE UP
-  AZTREONAM: SIGNIFICANT CHANGE UP
-  CEFAZOLIN: SIGNIFICANT CHANGE UP
-  CEFEPIME: SIGNIFICANT CHANGE UP
-  CEFOTAXIME: SIGNIFICANT CHANGE UP
-  CEFOXITIN: SIGNIFICANT CHANGE UP
-  CEFTAZIDIME: SIGNIFICANT CHANGE UP
-  CEFTRIAXONE: SIGNIFICANT CHANGE UP
-  CEFUROXIME: SIGNIFICANT CHANGE UP
-  CIPROFLOXACIN: SIGNIFICANT CHANGE UP
-  ERTAPENEM: SIGNIFICANT CHANGE UP
-  GENTAMICIN: SIGNIFICANT CHANGE UP
-  IMIPENEM: SIGNIFICANT CHANGE UP
-  LEVOFLOXACIN: SIGNIFICANT CHANGE UP
-  MEROPENEM: SIGNIFICANT CHANGE UP
-  PIPERACILLIN/TAZOBACTAM: SIGNIFICANT CHANGE UP
-  TIGECYCLINE: SIGNIFICANT CHANGE UP
-  TOBRAMYCIN: SIGNIFICANT CHANGE UP
-  TRIMETHOPRIM/SULFAMETHOXAZOLE: SIGNIFICANT CHANGE UP
ANION GAP SERPL CALC-SCNC: 11 MMOL/L — SIGNIFICANT CHANGE UP (ref 5–17)
BUN SERPL-MCNC: 15 MG/DL — SIGNIFICANT CHANGE UP (ref 7–23)
CALCIUM SERPL-MCNC: 7.7 MG/DL — LOW (ref 8.4–10.5)
CHLORIDE SERPL-SCNC: 110 MMOL/L — HIGH (ref 96–108)
CO2 SERPL-SCNC: 21 MMOL/L — LOW (ref 22–31)
CREAT SERPL-MCNC: 0.87 MG/DL — SIGNIFICANT CHANGE UP (ref 0.5–1.3)
GLUCOSE SERPL-MCNC: 76 MG/DL — SIGNIFICANT CHANGE UP (ref 70–99)
GRAM STN FLD: SIGNIFICANT CHANGE UP
HCT VFR BLD CALC: 23.1 % — LOW (ref 34.5–45)
HCT VFR BLD CALC: 26.8 % — LOW (ref 34.5–45)
HGB BLD-MCNC: 6.9 G/DL — CRITICAL LOW (ref 11.5–15.5)
HGB BLD-MCNC: 7.8 G/DL — LOW (ref 11.5–15.5)
MAGNESIUM SERPL-MCNC: 1.9 MG/DL — SIGNIFICANT CHANGE UP (ref 1.6–2.6)
MCHC RBC-ENTMCNC: 27.1 PG — SIGNIFICANT CHANGE UP (ref 27–34)
MCHC RBC-ENTMCNC: 27.5 PG — SIGNIFICANT CHANGE UP (ref 27–34)
MCHC RBC-ENTMCNC: 29.1 G/DL — LOW (ref 32–36)
MCHC RBC-ENTMCNC: 29.9 G/DL — LOW (ref 32–36)
MCV RBC AUTO: 92 FL — SIGNIFICANT CHANGE UP (ref 80–100)
MCV RBC AUTO: 93.1 FL — SIGNIFICANT CHANGE UP (ref 80–100)
METHOD TYPE: SIGNIFICANT CHANGE UP
PHOSPHATE SERPL-MCNC: 3.5 MG/DL — SIGNIFICANT CHANGE UP (ref 2.5–4.5)
PLATELET # BLD AUTO: 274 K/UL — SIGNIFICANT CHANGE UP (ref 150–400)
PLATELET # BLD AUTO: 326 K/UL — SIGNIFICANT CHANGE UP (ref 150–400)
POTASSIUM SERPL-MCNC: 3.7 MMOL/L — SIGNIFICANT CHANGE UP (ref 3.5–5.3)
POTASSIUM SERPL-SCNC: 3.7 MMOL/L — SIGNIFICANT CHANGE UP (ref 3.5–5.3)
RBC # BLD: 2.51 M/UL — LOW (ref 3.8–5.2)
RBC # BLD: 2.88 M/UL — LOW (ref 3.8–5.2)
RBC # FLD: 16.9 % — SIGNIFICANT CHANGE UP (ref 10.3–16.9)
RBC # FLD: 17 % — HIGH (ref 10.3–16.9)
SODIUM SERPL-SCNC: 142 MMOL/L — SIGNIFICANT CHANGE UP (ref 135–145)
WBC # BLD: 4.7 K/UL — SIGNIFICANT CHANGE UP (ref 3.8–10.5)
WBC # BLD: 5.8 K/UL — SIGNIFICANT CHANGE UP (ref 3.8–10.5)
WBC # FLD AUTO: 4.7 K/UL — SIGNIFICANT CHANGE UP (ref 3.8–10.5)
WBC # FLD AUTO: 5.8 K/UL — SIGNIFICANT CHANGE UP (ref 3.8–10.5)

## 2018-03-12 RX ORDER — PREGABALIN 225 MG/1
500 CAPSULE ORAL ONCE
Qty: 0 | Refills: 0 | Status: COMPLETED | OUTPATIENT
Start: 2018-03-12 | End: 2018-03-12

## 2018-03-12 RX ORDER — CIPROFLOXACIN LACTATE 400MG/40ML
750 VIAL (ML) INTRAVENOUS EVERY 12 HOURS
Qty: 0 | Refills: 0 | Status: DISCONTINUED | OUTPATIENT
Start: 2018-03-12 | End: 2018-03-13

## 2018-03-12 RX ORDER — POTASSIUM CHLORIDE 20 MEQ
40 PACKET (EA) ORAL ONCE
Qty: 0 | Refills: 0 | Status: COMPLETED | OUTPATIENT
Start: 2018-03-12 | End: 2018-03-12

## 2018-03-12 RX ORDER — LINEZOLID 600 MG/300ML
1 INJECTION, SOLUTION INTRAVENOUS
Qty: 0 | Refills: 0 | COMMUNITY
Start: 2018-03-12

## 2018-03-12 RX ORDER — ERYTHROPOIETIN 10000 [IU]/ML
2000 INJECTION, SOLUTION INTRAVENOUS; SUBCUTANEOUS ONCE
Qty: 0 | Refills: 0 | Status: COMPLETED | OUTPATIENT
Start: 2018-03-12 | End: 2018-03-12

## 2018-03-12 RX ORDER — CALCIUM GLUCONATE 100 MG/ML
500 VIAL (ML) INTRAVENOUS ONCE
Qty: 0 | Refills: 0 | Status: COMPLETED | OUTPATIENT
Start: 2018-03-12 | End: 2018-03-12

## 2018-03-12 RX ORDER — LINEZOLID 600 MG/300ML
600 INJECTION, SOLUTION INTRAVENOUS EVERY 12 HOURS
Qty: 0 | Refills: 0 | Status: DISCONTINUED | OUTPATIENT
Start: 2018-03-12 | End: 2018-03-16

## 2018-03-12 RX ORDER — MAGNESIUM SULFATE 500 MG/ML
2 VIAL (ML) INJECTION ONCE
Qty: 0 | Refills: 0 | Status: COMPLETED | OUTPATIENT
Start: 2018-03-12 | End: 2018-03-12

## 2018-03-12 RX ORDER — CIPROFLOXACIN LACTATE 400MG/40ML
1 VIAL (ML) INTRAVENOUS
Qty: 0 | Refills: 0 | COMMUNITY
Start: 2018-03-12

## 2018-03-12 RX ADMIN — HEPARIN SODIUM 5000 UNIT(S): 5000 INJECTION INTRAVENOUS; SUBCUTANEOUS at 21:09

## 2018-03-12 RX ADMIN — HEPARIN SODIUM 5000 UNIT(S): 5000 INJECTION INTRAVENOUS; SUBCUTANEOUS at 13:25

## 2018-03-12 RX ADMIN — Medication 40 MILLIEQUIVALENT(S): at 07:56

## 2018-03-12 RX ADMIN — Medication 25 MILLIGRAM(S): at 21:08

## 2018-03-12 RX ADMIN — ERYTHROPOIETIN 2000 UNIT(S): 10000 INJECTION, SOLUTION INTRAVENOUS; SUBCUTANEOUS at 17:21

## 2018-03-12 RX ADMIN — ESCITALOPRAM OXALATE 20 MILLIGRAM(S): 10 TABLET, FILM COATED ORAL at 13:25

## 2018-03-12 RX ADMIN — LINEZOLID 600 MILLIGRAM(S): 600 INJECTION, SOLUTION INTRAVENOUS at 18:36

## 2018-03-12 RX ADMIN — Medication 5 MILLIGRAM(S): at 21:09

## 2018-03-12 RX ADMIN — PREGABALIN 500 MICROGRAM(S): 225 CAPSULE ORAL at 17:18

## 2018-03-12 RX ADMIN — Medication 100 MILLIGRAM(S): at 07:02

## 2018-03-12 RX ADMIN — LOSARTAN POTASSIUM 50 MILLIGRAM(S): 100 TABLET, FILM COATED ORAL at 05:09

## 2018-03-12 RX ADMIN — Medication 500 MILLIGRAM(S): at 07:56

## 2018-03-12 RX ADMIN — Medication 750 MILLIGRAM(S): at 17:33

## 2018-03-12 RX ADMIN — Medication 50 GRAM(S): at 07:56

## 2018-03-12 RX ADMIN — Medication 100 MILLIGRAM(S): at 15:21

## 2018-03-12 RX ADMIN — SODIUM CHLORIDE 125 MILLILITER(S): 9 INJECTION INTRAMUSCULAR; INTRAVENOUS; SUBCUTANEOUS at 11:43

## 2018-03-12 RX ADMIN — HEPARIN SODIUM 5000 UNIT(S): 5000 INJECTION INTRAVENOUS; SUBCUTANEOUS at 05:09

## 2018-03-12 RX ADMIN — MIRTAZAPINE 7.5 MILLIGRAM(S): 45 TABLET, ORALLY DISINTEGRATING ORAL at 21:09

## 2018-03-12 NOTE — CONSULT NOTE ADULT - ASSESSMENT
Patient is 58 yo F with multiple infections, with multiple MDR organisms  #Abscess  -Given history, only oral regimen that would work is linezolid and ciprofloxacin. If patient must be discharged on PO abx, otherwise IV abx as below in attending attestation.     Patient seen and examined bedside with attending. Case discussed with primary team

## 2018-03-12 NOTE — CONSULT NOTE ADULT - SUBJECTIVE AND OBJECTIVE BOX
INFECTIOUS DISEASE SERVICE INITIAL CONSULT NOTE    HPI:  55 yo female w/PMHx of HTN, gastric bypass in 2002 complicated by stricture, corkscrewed sleeve and chronic leak, revised on 11/28/16 with protracted (70-day) postoperative course complicated by MDR infections, a C-diff infection, respiratory compromise due to plugging/PNA/effusions requiring multiple interventions and a trach, as well as a continued leak requiring a draining (double-pigtail) with stenting performed by GI. Patient had longstanding enterocutaneous fistula which she presented for resection of. She underwent a long prolonged hospital course including multiple SICU admissions and the following procedures. 7/24: SBR resection, fistula resection, revision of esophagogegunostomy drain through esophageal hiatus in the right mediastinum. 7/29: Ex lap, SB anastomosis in BP limb breakdown with succus throughout abdomen. 8/1: Abdominal washout, drainage of abscess, biologic mesh placement, closure of abdominal wall, vac and retention suture. 8/7: Abdominal washout, mesh removal frozen abdomen noted, LLQ CHECO placement with benson drain. 8/10: Leak noted from previous anastomosis site on left side, mid abdomen malecot drain placed in enterotomy, 2 JPs placed, necrotic fascia debrided, vicryl mesh sutured to fascia circumferentially, xeroform over mesh then vac dressing placed. 1/30: Ex-lap, resection of EC fistula, SBR x2 with patino hand sewn anastomosis, placement of 2 JPs (SQ) placement, primary abdominal closure with b/l external oblique release and prevena placement. Since discharge after this surgery she has had a left lower quadrant abdominal collection that has been draining, with multiple ED and office visits. Despite packing/drainage attempts this has persisted and she presents for this issue. (10 Mar 2018 22:16)      ADDITIONAL ID HPI:    PAST MEDICAL & SURGICAL HISTORY:  Fistula of intestine  Washed out  Reflux  Obesity  DVT (deep venous thrombosis): 2013 neck  Diverticulitis  Hypertension  Elective surgery: abodominal wall surgery  dec 2016  Elective surgery: NOV 2016 gastric bypass revision  Gastric bypass status for obesity: gastric sleeve, 6/2012  S/P breast biopsy: 2011, left  S/P colon resection: 2011  S/P knee surgery: repair 2009, 2011  right; left  Umbilical hernia: 2000  S/P appendectomy: 1975  S/P tonsillectomy: 1967      REVIEW OF SYSTEMS:  Otherwise negative other than what is stated in the HPI.    ACTIVE ANTIMICROBIAL/ANTIBIOTIC MEDICATIONS:  clindamycin IVPB      clindamycin IVPB 600 milliGRAM(s) IV Intermittent every 8 hours      PRIOR ANTIMICROBIAL HISTORY ON THIS ADMISSION:    clindamycin IVPB   100 mL/Hr IV Intermittent (03-10-18 @ 23:54)    clindamycin IVPB   100 mL/Hr IV Intermittent (03-12-18 @ 07:02)   100 mL/Hr IV Intermittent (03-11-18 @ 23:06)   100 mL/Hr IV Intermittent (03-11-18 @ 16:23)   100 mL/Hr IV Intermittent (03-11-18 @ 06:22)        OTHER MEDICATIONS:  diazepam    Tablet 5 milliGRAM(s) Oral at bedtime  diphenhydrAMINE   Capsule 25 milliGRAM(s) Oral at bedtime PRN  escitalopram 20 milliGRAM(s) Oral daily  heparin  Injectable 5000 Unit(s) SubCutaneous every 8 hours  losartan 50 milliGRAM(s) Oral daily  mirtazapine 7.5 milliGRAM(s) Oral at bedtime  ondansetron Injectable 4 milliGRAM(s) IV Push every 6 hours PRN  sodium chloride 0.9%. 1000 milliLiter(s) IV Continuous <Continuous>      ALLERGIES:  Allergies    sulfa drugs (Unknown)  sulfamethoxazole (Other)    Intolerances        SOCIAL HISTORY:    FAMILY HISTORY:  No pertinent family history in first degree relatives      VITAL SIGNS:  ICU Vital Signs Last 24 Hrs  T(C): 37.7 (12 Mar 2018 13:42), Max: 37.7 (12 Mar 2018 13:42)  T(F): 99.9 (12 Mar 2018 13:42), Max: 99.9 (12 Mar 2018 13:42)  HR: 67 (12 Mar 2018 13:42) (67 - 81)  BP: 144/87 (12 Mar 2018 13:42) (127/85 - 144/87)  BP(mean): --  ABP: --  ABP(mean): --  RR: 16 (12 Mar 2018 13:42) (16 - 18)  SpO2: 93% (12 Mar 2018 13:42) (93% - 96%)      PHYSICAL EXAM:  Constitutional: WDWN  Head: NC/AT  Eyes: PERRL; anicteric sclera  ENMT: no rhinorrhea; no sinus tenderness on palpation; no oropharyngeal lesions, erythema, or exudates	  Neck: supple; no JVD or LAD  Respiratory: CTA B/L  Cardiovascular: +S1/S2, RRR  Gastrointestinal: soft, NT/ND; intact BS; no HSM  Extremities: WWP; no clubbing, cyanosis, or edema  Vascular: 2+ radial, DP/PT pulses B/L  Dermatologic: skin warm and dry; no visible rashes or lesions  Neurologic: AAOx3; no focal deficits    LABS:                        6.9    4.7   )-----------( 274      ( 12 Mar 2018 06:45 )             23.1     03-12    142  |  110<H>  |  15  ----------------------------<  76  3.7   |  21<L>  |  0.87    Ca    7.7<L>      12 Mar 2018 06:45  Phos  3.5     03-12  Mg     1.9     03-12      PT/INR - ( 10 Mar 2018 20:01 )   PT: 14.6 sec;   INR: 1.31          PTT - ( 10 Mar 2018 20:01 )  PTT:30.4 sec      MICROBIOLOGY:    Culture - Body Fluid with Gram Stain (collected 03-11-18 @ 16:32)  Source: .Body Fluid Abdominal Wall Specimen  Gram Stain (03-12-18 @ 09:33):    Numerous White blood cells    Numerous Gram Negative Rods    Moderate to Numerous Gram Positive Cocci in Pairs and Chains  Preliminary Report (03-12-18 @ 08:41):    Numerous Gram Negative Rods Identification and susceptibility to follow.    Culture - Blood (collected 03-10-18 @ 21:24)  Source: .Blood Blood  Preliminary Report (03-11-18 @ 23:01):    No growth at 1 day.    Culture - Blood (collected 03-10-18 @ 21:24)  Source: .Blood Blood  Preliminary Report (03-11-18 @ 23:01):    No growth at 1 day.    Culture - Body Fluid with Gram Stain (collected 03-10-18 @ 21:04)  Source: .Body Fluid Abdominal Fluid  Gram Stain (03-10-18 @ 22:28):    polymorphonuclear leukocytes seen    Gram Negative Rods seen    by cytocentrifuge  Preliminary Report (03-11-18 @ 15:36):    Moderate Escherichia coli    Culture - Body Fluid with Gram Stain (08.02.17 @ 12:19)    -  Gentamicin: S <=4    -  Trimethoprim/Sulfamethoxazole: S <=2/38    -  Gentamicin: S <=4    -  Trimethoprim/Sulfamethoxazole: S <=2/38    -  Ciprofloxacin: S <=1    -  Ciprofloxacin: S <=1    Gram Stain:   Moderate Gram Negative Rods  Numerous White blood cells    -  Cefazolin: S <=8    -  Cefazolin: S <=8    -  Ampicillin: S <=8    -  Ampicillin: R >16    -  Piperacillin/Tazobactam: S <=16    -  Tobramycin: S <=4    -  Piperacillin/Tazobactam: S <=16    -  Tobramycin: S <=4    -  Ampicillin/Sulbactam: S <=8/4    -  Ceftriaxone: S <=1    -  Ampicillin/Sulbactam: S <=8/4    -  Ceftriaxone: S <=1    Specimen Source: .Body Fluid LEFT lower CHECO    Culture Results:   Numerous Escherichia coli  Few Klebsiella oxytoca    Organism Identification: Escherichia coli  Klebsiella oxytoca    Organism: Escherichia coli    Organism: Klebsiella oxytoca    Method Type: ALISHA    Method Type: ALISHA    Gram Stain:   Aerobic Bottle: Gram Positive Cocci in Clusters  Result called to and read back by_ Ms. ELÍAS Monte RN  10/10/2017  10:48:59 (10.09.17 @ 13:45)        RADIOLOGY & ADDITIONAL STUDIES: INFECTIOUS DISEASE SERVICE INITIAL CONSULT NOTE    HPI:  57 yo female w/PMHx of HTN, gastric bypass in 2002 complicated by stricture, corkscrewed sleeve and chronic leak, revised on 11/28/16 with protracted (70-day) postoperative course complicated by MDR infections, a C-diff infection, respiratory compromise due to plugging/PNA/effusions requiring multiple interventions and a trach, as well as a continued leak requiring a draining (double-pigtail) with stenting performed by GI. Patient had longstanding enterocutaneous fistula which she presented for resection of. She underwent a long prolonged hospital course including multiple SICU admissions and the following procedures. 7/24: SBR resection, fistula resection, revision of esophagogegunostomy drain through esophageal hiatus in the right mediastinum. 7/29: Ex lap, SB anastomosis in BP limb breakdown with succus throughout abdomen. 8/1: Abdominal washout, drainage of abscess, biologic mesh placement, closure of abdominal wall, vac and retention suture. 8/7: Abdominal washout, mesh removal frozen abdomen noted, LLQ CHECO placement with benson drain. 8/10: Leak noted from previous anastomosis site on left side, mid abdomen malecot drain placed in enterotomy, 2 JPs placed, necrotic fascia debrided, vicryl mesh sutured to fascia circumferentially, xeroform over mesh then vac dressing placed. 1/30: Ex-lap, resection of EC fistula, SBR x2 with patino hand sewn anastomosis, placement of 2 JPs (SQ) placement, primary abdominal closure with b/l external oblique release and prevena placement. Since discharge after this surgery she has had a left lower quadrant abdominal collection that has been draining, with multiple ED and office visits. Despite packing/drainage attempts this has persisted and she presents for this issue. (10 Mar 2018 22:16)      ADDITIONAL ID HPI: Patient with abscess drained by surgery, draining from skin, growing as below    PAST MEDICAL & SURGICAL HISTORY:  Fistula of intestine  Washed out  Reflux  Obesity  DVT (deep venous thrombosis): 2013 neck  Diverticulitis  Hypertension  Elective surgery: abodominal wall surgery  dec 2016  Elective surgery: NOV 2016 gastric bypass revision  Gastric bypass status for obesity: gastric sleeve, 6/2012  S/P breast biopsy: 2011, left  S/P colon resection: 2011  S/P knee surgery: repair 2009, 2011  right; left  Umbilical hernia: 2000  S/P appendectomy: 1975  S/P tonsillectomy: 1967      REVIEW OF SYSTEMS:  Otherwise negative other than what is stated in the HPI.    ACTIVE ANTIMICROBIAL/ANTIBIOTIC MEDICATIONS:  clindamycin IVPB      clindamycin IVPB 600 milliGRAM(s) IV Intermittent every 8 hours      PRIOR ANTIMICROBIAL HISTORY ON THIS ADMISSION:    clindamycin IVPB   100 mL/Hr IV Intermittent (03-10-18 @ 23:54)    clindamycin IVPB   100 mL/Hr IV Intermittent (03-12-18 @ 07:02)   100 mL/Hr IV Intermittent (03-11-18 @ 23:06)   100 mL/Hr IV Intermittent (03-11-18 @ 16:23)   100 mL/Hr IV Intermittent (03-11-18 @ 06:22)        OTHER MEDICATIONS:  diazepam    Tablet 5 milliGRAM(s) Oral at bedtime  diphenhydrAMINE   Capsule 25 milliGRAM(s) Oral at bedtime PRN  escitalopram 20 milliGRAM(s) Oral daily  heparin  Injectable 5000 Unit(s) SubCutaneous every 8 hours  losartan 50 milliGRAM(s) Oral daily  mirtazapine 7.5 milliGRAM(s) Oral at bedtime  ondansetron Injectable 4 milliGRAM(s) IV Push every 6 hours PRN  sodium chloride 0.9%. 1000 milliLiter(s) IV Continuous <Continuous>      ALLERGIES:  Allergies    sulfa drugs (Unknown)  sulfamethoxazole (Other)    Intolerances        SOCIAL HISTORY:    FAMILY HISTORY:  No pertinent family history in first degree relatives      VITAL SIGNS:  ICU Vital Signs Last 24 Hrs  T(C): 37.7 (12 Mar 2018 13:42), Max: 37.7 (12 Mar 2018 13:42)  T(F): 99.9 (12 Mar 2018 13:42), Max: 99.9 (12 Mar 2018 13:42)  HR: 67 (12 Mar 2018 13:42) (67 - 81)  BP: 144/87 (12 Mar 2018 13:42) (127/85 - 144/87)  BP(mean): --  ABP: --  ABP(mean): --  RR: 16 (12 Mar 2018 13:42) (16 - 18)  SpO2: 93% (12 Mar 2018 13:42) (93% - 96%)      PHYSICAL EXAM:  Constitutional: WDWN  Head: NC/AT  Eyes: PERRL; anicteric sclera  ENMT: no rhinorrhea; no sinus tenderness on palpation; no oropharyngeal lesions, erythema, or exudates	  Neck: supple; no JVD or LAD  Respiratory: CTA B/L  Cardiovascular: +S1/S2, RRR  Gastrointestinal: soft, NT/ND; intact BS; no HSM  Extremities: WWP; no clubbing, cyanosis, or edema  Vascular: 2+ radial, DP/PT pulses B/L  Dermatologic: skin warm and dry; no visible rashes or lesions  Neurologic: AAOx3; no focal deficits    LABS:                        6.9    4.7   )-----------( 274      ( 12 Mar 2018 06:45 )             23.1     03-12    142  |  110<H>  |  15  ----------------------------<  76  3.7   |  21<L>  |  0.87    Ca    7.7<L>      12 Mar 2018 06:45  Phos  3.5     03-12  Mg     1.9     03-12      PT/INR - ( 10 Mar 2018 20:01 )   PT: 14.6 sec;   INR: 1.31          PTT - ( 10 Mar 2018 20:01 )  PTT:30.4 sec      MICROBIOLOGY:    Culture - Body Fluid with Gram Stain (collected 03-11-18 @ 16:32)  Source: .Body Fluid Abdominal Wall Specimen  Gram Stain (03-12-18 @ 09:33):    Numerous White blood cells    Numerous Gram Negative Rods    Moderate to Numerous Gram Positive Cocci in Pairs and Chains  Preliminary Report (03-12-18 @ 08:41):    Numerous Gram Negative Rods Identification and susceptibility to follow.    Culture - Blood (collected 03-10-18 @ 21:24)  Source: .Blood Blood  Preliminary Report (03-11-18 @ 23:01):    No growth at 1 day.    Culture - Blood (collected 03-10-18 @ 21:24)  Source: .Blood Blood  Preliminary Report (03-11-18 @ 23:01):    No growth at 1 day.    Culture - Body Fluid with Gram Stain (collected 03-10-18 @ 21:04)  Source: .Body Fluid Abdominal Fluid  Gram Stain (03-10-18 @ 22:28):    polymorphonuclear leukocytes seen    Gram Negative Rods seen    by cytocentrifuge  Preliminary Report (03-11-18 @ 15:36):    Moderate Escherichia coli    Culture - Body Fluid with Gram Stain (08.02.17 @ 12:19)    -  Gentamicin: S <=4    -  Trimethoprim/Sulfamethoxazole: S <=2/38    -  Gentamicin: S <=4    -  Trimethoprim/Sulfamethoxazole: S <=2/38    -  Ciprofloxacin: S <=1    -  Ciprofloxacin: S <=1    Gram Stain:   Moderate Gram Negative Rods  Numerous White blood cells    -  Cefazolin: S <=8    -  Cefazolin: S <=8    -  Ampicillin: S <=8    -  Ampicillin: R >16    -  Piperacillin/Tazobactam: S <=16    -  Tobramycin: S <=4    -  Piperacillin/Tazobactam: S <=16    -  Tobramycin: S <=4    -  Ampicillin/Sulbactam: S <=8/4    -  Ceftriaxone: S <=1    -  Ampicillin/Sulbactam: S <=8/4    -  Ceftriaxone: S <=1    Specimen Source: .Body Fluid LEFT lower CHECO    Culture Results:   Numerous Escherichia coli  Few Klebsiella oxytoca    Organism Identification: Escherichia coli  Klebsiella oxytoca    Organism: Escherichia coli    Organism: Klebsiella oxytoca    Method Type: ALISHA    Method Type: ALISHA    Gram Stain:   Aerobic Bottle: Gram Positive Cocci in Clusters  Result called to and read back by_ Ms. ELÍAS Monte RN  10/10/2017  10:48:59 (10.09.17 @ 13:45)        RADIOLOGY & ADDITIONAL STUDIES:

## 2018-03-12 NOTE — PROGRESS NOTE ADULT - ASSESSMENT
55 yo female w/PMHx of HTN, gastric bypass in 2002 complicated by stricture awith extensive history admitted with left lower quadrant abdominal abscess that has been draining    pain/nausea control  CLD/IVF  IV clindamycin  SCDs  AM labs 57 yo female w/PMHx of HTN, gastric bypass in 2002 complicated by stricture awith extensive history admitted with left lower quadrant abdominal abscess that has been draining    pain/nausea control  CLD/IVF  IV clindamycin  SCDs  AM labs  Awaiting MICHELE placement.

## 2018-03-12 NOTE — CHART NOTE - NSCHARTNOTEFT_GEN_A_CORE
General Surgery PGY3 Note    Case was discussed between Dr. Brady and Dr. Marie (ID). Prior sensitivities reviewed by both attendings. Will plan to discharge pt to her rehab center on Linezolid and Ciprofloxacin. Current cultures will be followed and the patient will also be followed clinically.    -Case discussed with Dr. Brady General Surgery PGY3 Note    Case was discussed between Dr. Brady and Dr. Marie (ID). Prior sensitivities reviewed by Dr. Marie. Will plan to discharge pt to her rehab center on Linezolid and Ciprofloxacin. Current cultures will be followed and the patient will also be followed clinically.    -Case discussed with Dr. Brady

## 2018-03-12 NOTE — PROGRESS NOTE ADULT - SUBJECTIVE AND OBJECTIVE BOX
O/N: awaiting social work completion of MICHELE application. VSS. BRAYDEN  3/11: 20ccs of fluid was drained by IR and 10.2 [place] drain placed, mildred O/N: awaiting social work completion of MICHELE application. VSS. BRAYDEN  3/11: 20ccs of fluid was drained by IR and 10.2 [place] drain placed, mildred       SUBJECTIVE: Patient seen and examined bedside by chief resident. IR drain continuing to drain with blood and pus. Patient afebrile overnight. Awaiting MICHELE re-placement    clindamycin IVPB      clindamycin IVPB 600 milliGRAM(s) IV Intermittent every 8 hours  heparin  Injectable 5000 Unit(s) SubCutaneous every 8 hours  losartan 50 milliGRAM(s) Oral daily      Vital Signs Last 24 Hrs  T(C): 36.1 (12 Mar 2018 05:28), Max: 37.2 (11 Mar 2018 08:55)  T(F): 97 (12 Mar 2018 05:28), Max: 98.9 (11 Mar 2018 08:55)  HR: 73 (12 Mar 2018 05:28) (73 - 81)  BP: 127/85 (12 Mar 2018 05:28) (115/65 - 139/94)  BP(mean): --  RR: 18 (12 Mar 2018 05:28) (16 - 18)  SpO2: 93% (12 Mar 2018 05:28) (93% - 96%)  I&O's Detail    11 Mar 2018 07:01  -  12 Mar 2018 07:00  --------------------------------------------------------  IN:    IV PiggyBack: 200 mL    sodium chloride 0.9%.: 1500 mL  Total IN: 1700 mL    OUT:    Bulb: 80 mL    Voided: 850 mL  Total OUT: 930 mL    Total NET: 770 mL          General: NAD, resting comfortably in bed  C/V: NSR  Pulm: Nonlabored breathing, no respiratory distress  Abd: soft, slight tender, non-distended, IR drain in place, draining blood and pus  Extrem: WWP, no edema, SCDs in place        LABS:                        6.9    4.7   )-----------( 274      ( 12 Mar 2018 06:45 )             23.1     03-10    139  |  106  |  21  ----------------------------<  94  3.4<L>   |  21<L>  |  1.00    Ca    8.6      10 Mar 2018 20:00      PT/INR - ( 10 Mar 2018 20:01 )   PT: 14.6 sec;   INR: 1.31          PTT - ( 10 Mar 2018 20:01 )  PTT:30.4 sec      RADIOLOGY & ADDITIONAL STUDIES:

## 2018-03-12 NOTE — CONSULT NOTE ADULT - ATTENDING COMMENTS
ID Attending    Situation reviewed; extremely complex history of recurrent intra-abdominal infections, multiple procedures for drainage, fistula excision, and now with LLQ abscess drainage by IR: cultures are growing multiple GNR.    Previous isolates over the last year include VRE, MRSA, PIP-Tazo resistant E. coli, P. mirabilis.    There is a remote history of C. difficile, but she is not symptomatic at present.    Impression:  very complex patient whose antimicrobial regimen will depend on most recent culture.    Recommend:    1. D/C clindamycin.  2. Daptomycin 600 mg iv q 24h  3. Ertapenem 1 gm iv qd  4. Micro Lab notified to isolate, identify and test all organisms in abscess drainage  5. Contact isolation

## 2018-03-13 LAB
-  AMPICILLIN/SULBACTAM: SIGNIFICANT CHANGE UP
-  AMPICILLIN: SIGNIFICANT CHANGE UP
-  CEFAZOLIN: SIGNIFICANT CHANGE UP
-  CEFTRIAXONE: SIGNIFICANT CHANGE UP
-  CIPROFLOXACIN: SIGNIFICANT CHANGE UP
-  GENTAMICIN: SIGNIFICANT CHANGE UP
-  IMIPENEM: SIGNIFICANT CHANGE UP
-  PIPERACILLIN/TAZOBACTAM: SIGNIFICANT CHANGE UP
-  TOBRAMYCIN: SIGNIFICANT CHANGE UP
-  TRIMETHOPRIM/SULFAMETHOXAZOLE: SIGNIFICANT CHANGE UP
ALBUMIN SERPL ELPH-MCNC: 2.3 G/DL — LOW (ref 3.3–5)
ALP SERPL-CCNC: 194 U/L — HIGH (ref 40–120)
ALT FLD-CCNC: 17 U/L — SIGNIFICANT CHANGE UP (ref 10–45)
ANION GAP SERPL CALC-SCNC: 11 MMOL/L — SIGNIFICANT CHANGE UP (ref 5–17)
AST SERPL-CCNC: 17 U/L — SIGNIFICANT CHANGE UP (ref 10–40)
BILIRUB SERPL-MCNC: 0.4 MG/DL — SIGNIFICANT CHANGE UP (ref 0.2–1.2)
BUN SERPL-MCNC: 10 MG/DL — SIGNIFICANT CHANGE UP (ref 7–23)
CALCIUM SERPL-MCNC: 8.2 MG/DL — LOW (ref 8.4–10.5)
CHLORIDE SERPL-SCNC: 108 MMOL/L — SIGNIFICANT CHANGE UP (ref 96–108)
CO2 SERPL-SCNC: 20 MMOL/L — LOW (ref 22–31)
CREAT SERPL-MCNC: 0.77 MG/DL — SIGNIFICANT CHANGE UP (ref 0.5–1.3)
GLUCOSE SERPL-MCNC: 69 MG/DL — LOW (ref 70–99)
HCT VFR BLD CALC: 24.8 % — LOW (ref 34.5–45)
HGB BLD-MCNC: 7.4 G/DL — LOW (ref 11.5–15.5)
MAGNESIUM SERPL-MCNC: 2.2 MG/DL — SIGNIFICANT CHANGE UP (ref 1.6–2.6)
MCHC RBC-ENTMCNC: 27 PG — SIGNIFICANT CHANGE UP (ref 27–34)
MCHC RBC-ENTMCNC: 29.8 G/DL — LOW (ref 32–36)
MCV RBC AUTO: 90.5 FL — SIGNIFICANT CHANGE UP (ref 80–100)
METHOD TYPE: SIGNIFICANT CHANGE UP
METHOD TYPE: SIGNIFICANT CHANGE UP
PHOSPHATE SERPL-MCNC: 2.9 MG/DL — SIGNIFICANT CHANGE UP (ref 2.5–4.5)
PLATELET # BLD AUTO: 318 K/UL — SIGNIFICANT CHANGE UP (ref 150–400)
POTASSIUM SERPL-MCNC: 4 MMOL/L — SIGNIFICANT CHANGE UP (ref 3.5–5.3)
POTASSIUM SERPL-SCNC: 4 MMOL/L — SIGNIFICANT CHANGE UP (ref 3.5–5.3)
PROT SERPL-MCNC: 5.8 G/DL — LOW (ref 6–8.3)
RBC # BLD: 2.74 M/UL — LOW (ref 3.8–5.2)
RBC # FLD: 16.6 % — SIGNIFICANT CHANGE UP (ref 10.3–16.9)
SODIUM SERPL-SCNC: 139 MMOL/L — SIGNIFICANT CHANGE UP (ref 135–145)
WBC # BLD: 5.2 K/UL — SIGNIFICANT CHANGE UP (ref 3.8–10.5)
WBC # FLD AUTO: 5.2 K/UL — SIGNIFICANT CHANGE UP (ref 3.8–10.5)

## 2018-03-13 PROCEDURE — 86900 BLOOD TYPING SEROLOGIC ABO: CPT

## 2018-03-13 PROCEDURE — 97161 PT EVAL LOW COMPLEX 20 MIN: CPT

## 2018-03-13 PROCEDURE — 87205 SMEAR GRAM STAIN: CPT

## 2018-03-13 PROCEDURE — 84134 ASSAY OF PREALBUMIN: CPT

## 2018-03-13 PROCEDURE — 86850 RBC ANTIBODY SCREEN: CPT

## 2018-03-13 PROCEDURE — 87075 CULTR BACTERIA EXCEPT BLOOD: CPT

## 2018-03-13 PROCEDURE — 86923 COMPATIBILITY TEST ELECTRIC: CPT

## 2018-03-13 PROCEDURE — 80053 COMPREHEN METABOLIC PANEL: CPT

## 2018-03-13 PROCEDURE — 87086 URINE CULTURE/COLONY COUNT: CPT

## 2018-03-13 PROCEDURE — 71045 X-RAY EXAM CHEST 1 VIEW: CPT

## 2018-03-13 PROCEDURE — 83735 ASSAY OF MAGNESIUM: CPT

## 2018-03-13 PROCEDURE — 81001 URINALYSIS AUTO W/SCOPE: CPT

## 2018-03-13 PROCEDURE — 84100 ASSAY OF PHOSPHORUS: CPT

## 2018-03-13 PROCEDURE — 85025 COMPLETE CBC W/AUTO DIFF WBC: CPT

## 2018-03-13 PROCEDURE — 87186 SC STD MICRODIL/AGAR DIL: CPT

## 2018-03-13 PROCEDURE — C1751: CPT

## 2018-03-13 PROCEDURE — 36569 INSJ PICC 5 YR+ W/O IMAGING: CPT | Mod: 52

## 2018-03-13 PROCEDURE — 80048 BASIC METABOLIC PNL TOTAL CA: CPT

## 2018-03-13 PROCEDURE — 36430 TRANSFUSION BLD/BLD COMPNT: CPT

## 2018-03-13 PROCEDURE — 74177 CT ABD & PELVIS W/CONTRAST: CPT

## 2018-03-13 PROCEDURE — 94640 AIRWAY INHALATION TREATMENT: CPT

## 2018-03-13 PROCEDURE — P9016: CPT

## 2018-03-13 PROCEDURE — 81003 URINALYSIS AUTO W/O SCOPE: CPT

## 2018-03-13 PROCEDURE — 85730 THROMBOPLASTIN TIME PARTIAL: CPT

## 2018-03-13 PROCEDURE — 86901 BLOOD TYPING SEROLOGIC RH(D): CPT

## 2018-03-13 PROCEDURE — 97162 PT EVAL MOD COMPLEX 30 MIN: CPT

## 2018-03-13 PROCEDURE — 77001 FLUOROGUIDE FOR VEIN DEVICE: CPT

## 2018-03-13 PROCEDURE — 97530 THERAPEUTIC ACTIVITIES: CPT

## 2018-03-13 PROCEDURE — 97116 GAIT TRAINING THERAPY: CPT

## 2018-03-13 PROCEDURE — 87070 CULTURE OTHR SPECIMN AEROBIC: CPT

## 2018-03-13 PROCEDURE — 93005 ELECTROCARDIOGRAM TRACING: CPT | Mod: XU

## 2018-03-13 PROCEDURE — 85610 PROTHROMBIN TIME: CPT

## 2018-03-13 PROCEDURE — 36415 COLL VENOUS BLD VENIPUNCTURE: CPT

## 2018-03-13 PROCEDURE — 51702 INSERT TEMP BLADDER CATH: CPT

## 2018-03-13 PROCEDURE — 99285 EMERGENCY DEPT VISIT HI MDM: CPT | Mod: 25

## 2018-03-13 PROCEDURE — 76937 US GUIDE VASCULAR ACCESS: CPT

## 2018-03-13 PROCEDURE — 85027 COMPLETE CBC AUTOMATED: CPT

## 2018-03-13 PROCEDURE — 87040 BLOOD CULTURE FOR BACTERIA: CPT

## 2018-03-13 PROCEDURE — 96374 THER/PROPH/DIAG INJ IV PUSH: CPT | Mod: XU

## 2018-03-13 RX ORDER — ACETAMINOPHEN 500 MG
650 TABLET ORAL ONCE
Qty: 0 | Refills: 0 | Status: COMPLETED | OUTPATIENT
Start: 2018-03-13 | End: 2018-03-13

## 2018-03-13 RX ORDER — SODIUM FERRIC GLUCONAT/SUCROSE 62.5MG/5ML
25 AMPUL (ML) INTRAVENOUS ONCE
Qty: 0 | Refills: 0 | Status: COMPLETED | OUTPATIENT
Start: 2018-03-13 | End: 2018-03-13

## 2018-03-13 RX ORDER — ERTAPENEM SODIUM 1 G/1
1000 INJECTION, POWDER, LYOPHILIZED, FOR SOLUTION INTRAMUSCULAR; INTRAVENOUS EVERY 24 HOURS
Qty: 0 | Refills: 0 | Status: DISCONTINUED | OUTPATIENT
Start: 2018-03-14 | End: 2018-03-16

## 2018-03-13 RX ORDER — ERTAPENEM SODIUM 1 G/1
1000 INJECTION, POWDER, LYOPHILIZED, FOR SOLUTION INTRAMUSCULAR; INTRAVENOUS ONCE
Qty: 0 | Refills: 0 | Status: COMPLETED | OUTPATIENT
Start: 2018-03-13 | End: 2018-03-13

## 2018-03-13 RX ORDER — ERTAPENEM SODIUM 1 G/1
INJECTION, POWDER, LYOPHILIZED, FOR SOLUTION INTRAMUSCULAR; INTRAVENOUS
Qty: 0 | Refills: 0 | Status: DISCONTINUED | OUTPATIENT
Start: 2018-03-13 | End: 2018-03-13

## 2018-03-13 RX ORDER — ERTAPENEM SODIUM 1 G/1
INJECTION, POWDER, LYOPHILIZED, FOR SOLUTION INTRAMUSCULAR; INTRAVENOUS
Qty: 0 | Refills: 0 | Status: DISCONTINUED | OUTPATIENT
Start: 2018-03-13 | End: 2018-03-16

## 2018-03-13 RX ORDER — SODIUM FERRIC GLUCONAT/SUCROSE 62.5MG/5ML
100 AMPUL (ML) INTRAVENOUS ONCE
Qty: 0 | Refills: 0 | Status: COMPLETED | OUTPATIENT
Start: 2018-03-13 | End: 2018-03-13

## 2018-03-13 RX ADMIN — Medication 25 MILLIGRAM(S): at 22:05

## 2018-03-13 RX ADMIN — Medication 108 MILLIGRAM(S): at 17:18

## 2018-03-13 RX ADMIN — Medication 650 MILLIGRAM(S): at 05:53

## 2018-03-13 RX ADMIN — HEPARIN SODIUM 5000 UNIT(S): 5000 INJECTION INTRAVENOUS; SUBCUTANEOUS at 14:01

## 2018-03-13 RX ADMIN — Medication 5 MILLIGRAM(S): at 22:05

## 2018-03-13 RX ADMIN — LOSARTAN POTASSIUM 50 MILLIGRAM(S): 100 TABLET, FILM COATED ORAL at 05:17

## 2018-03-13 RX ADMIN — Medication 204 MILLIGRAM(S): at 10:05

## 2018-03-13 RX ADMIN — Medication 650 MILLIGRAM(S): at 05:25

## 2018-03-13 RX ADMIN — Medication 750 MILLIGRAM(S): at 17:18

## 2018-03-13 RX ADMIN — HEPARIN SODIUM 5000 UNIT(S): 5000 INJECTION INTRAVENOUS; SUBCUTANEOUS at 05:18

## 2018-03-13 RX ADMIN — LINEZOLID 600 MILLIGRAM(S): 600 INJECTION, SOLUTION INTRAVENOUS at 05:17

## 2018-03-13 RX ADMIN — Medication 750 MILLIGRAM(S): at 05:17

## 2018-03-13 RX ADMIN — ERTAPENEM SODIUM 120 MILLIGRAM(S): 1 INJECTION, POWDER, LYOPHILIZED, FOR SOLUTION INTRAMUSCULAR; INTRAVENOUS at 22:11

## 2018-03-13 RX ADMIN — ESCITALOPRAM OXALATE 20 MILLIGRAM(S): 10 TABLET, FILM COATED ORAL at 14:01

## 2018-03-13 RX ADMIN — LINEZOLID 600 MILLIGRAM(S): 600 INJECTION, SOLUTION INTRAVENOUS at 22:05

## 2018-03-13 RX ADMIN — HEPARIN SODIUM 5000 UNIT(S): 5000 INJECTION INTRAVENOUS; SUBCUTANEOUS at 22:06

## 2018-03-13 RX ADMIN — MIRTAZAPINE 7.5 MILLIGRAM(S): 45 TABLET, ORALLY DISINTEGRATING ORAL at 22:06

## 2018-03-13 NOTE — PROGRESS NOTE ADULT - ASSESSMENT
55 yo female w/PMHx of HTN, gastric bypass in 2002 complicated by stricture awith extensive history admitted with left lower quadrant abdominal abscess that has been draining    pain/nausea control  CLD/IVF  IV clindamycin  SCDs  AM labs 55 yo female w/PMHx of HTN, gastric bypass in 2002 complicated by stricture awith extensive history admitted with left lower quadrant abdominal abscess that has been draining    pain/nausea control  CLD/IVF  IV clindamycin  SCDs  AM labs  f/u CHECO drain output   Patient examined and discussed with Dr. Brady

## 2018-03-13 NOTE — PROGRESS NOTE ADULT - SUBJECTIVE AND OBJECTIVE BOX
INTERVAL HPI/OVERNIGHT EVENTS:  Patient was seen and examined at bedside. As per nurse and patient, no o/n events, patient resting comfortably. No complaints at this time. Patient denies: fever, chills, dizziness, weakness, HA, Changes in vision, CP, palpitations, SOB, cough, N/V/D/C, dysuria, changes in bowel movements, LE edema. ROS otherwise negative.    VITAL SIGNS:  T(F): 97.7 (03-13-18 @ 08:04)  HR: 67 (03-13-18 @ 08:04)  BP: 127/81 (03-13-18 @ 08:04)  RR: 17 (03-13-18 @ 08:04)  SpO2: 93% (03-13-18 @ 08:04)  Wt(kg): --    PHYSICAL EXAM:    Constitutional: WDWN, NAD  HEENT: PERRL, EOMI, sclera non-icteric, neck supple, trachea midline, no masses, no JVD, MMM, good dentition  Respiratory: CTA b/l, good air entry b/l, no wheezing, no rhonchi, no rales, without accessory muscle use and no intercostal retractions  Cardiovascular: RRR, normal S1S2, no M/R/G  Gastrointestinal: soft, NTND, no masses palpable, BS normal  Extremities: Warm, well perfused, pulses equal bilateral upper and lower extremities, no edema, no clubbing  Neurological: AAOx3, CN Grossly intact  Skin: Normal temperature, warm, dry    ACTIVE ANTIMICROBIALS/ANTIBIOTICS:  ciprofloxacin     Tablet 750 milliGRAM(s) Oral every 12 hours  linezolid    Tablet 600 milliGRAM(s) Oral every 12 hours        Allergies    sulfa drugs (Unknown)  sulfamethoxazole (Other)    Intolerances        LABS:                        7.4    5.2   )-----------( 318      ( 13 Mar 2018 07:34 )             24.8     03-13    139  |  108  |  10  ----------------------------<  69<L>  4.0   |  20<L>  |  0.77    Ca    8.2<L>      13 Mar 2018 07:34  Phos  2.9     03-13  Mg     2.2     03-13    TPro  5.8<L>  /  Alb  2.3<L>  /  TBili  0.4  /  DBili  x   /  AST  17  /  ALT  17  /  AlkPhos  194<H>  03-13            CURRENT CULTURE RESULTS:    Culture - Body Fluid with Gram Stain (collected 03-11-18 @ 16:32)  Source: .Body Fluid Abdominal Wall Specimen  Gram Stain (03-12-18 @ 09:33):    Numerous White blood cells    Numerous Gram Negative Rods    Moderate to Numerous Gram Positive Cocci in Pairs and Chains  Preliminary Report (03-13-18 @ 12:31):    Numerous Escherichia coli ESBL    Result called to and read back by_ FRANK Harrell RN (9 UR) 03/13/2018 12:31    Numerous Enterococcus species Identification and susceptibility to follow.  Organism: Escherichia coli ESBL  Enterococcus species (03-13-18 @ 12:24)  Organism: Enterococcus species (03-13-18 @ 12:24)      Method Type: KB  Organism: Escherichia coli ESBL (03-13-18 @ 12:22)      -  Ampicillin: R >16      -  Ampicillin/Sulbactam: R <=8/4      -  Cefazolin: R >16      -  Ceftriaxone: R >32      -  Ciprofloxacin: R >2      -  Gentamicin: S <=4      -  Imipenem: S <=1      -  Piperacillin/Tazobactam: R <=16      -  Tobramycin: S <=4      -  Trimethoprim/Sulfamethoxazole: R >2/38      Method Type: ALISHA    Culture - Blood (collected 03-10-18 @ 21:24)  Source: .Blood Blood  Preliminary Report (03-12-18 @ 23:01):    No growth at 2 days.    Culture - Blood (collected 03-10-18 @ 21:24)  Source: .Blood Blood  Preliminary Report (03-12-18 @ 23:01):    No growth at 2 days.    Culture - Body Fluid with Gram Stain (collected 03-10-18 @ 21:04)  Source: .Body Fluid Abdominal Fluid  Gram Stain (03-10-18 @ 22:28):    polymorphonuclear leukocytes seen    Gram Negative Rods seen    by cytocentrifuge  Preliminary Report (03-12-18 @ 22:05):    Moderate Escherichia coli ESBL  Organism: Escherichia coli ESBL (03-12-18 @ 22:05)  Organism: Escherichia coli ESBL (03-12-18 @ 22:05)      -  Amikacin: S <=8      -  Ampicillin: R >16      -  Ampicillin/Sulbactam: R 16/8      -  Aztreonam: R >16      -  Cefazolin: R >16      -  Cefepime: R >16      -  Cefotaxime: R >32      -  Cefoxitin: S 8      -  Ceftazidime: R 16      -  Ceftriaxone: R >32      -  Cefuroxime: R >16      -  Ciprofloxacin: R >2      -  Ertapenem: S <=0.5      -  Gentamicin: S <=1      -  Imipenem: S <=1      -  Levofloxacin: R >4      -  Meropenem: S <=1      -  Piperacillin/Tazobactam: R <=8      -  Tigecycline: S <=1      -  Tobramycin: S <=2      -  Trimethoprim/Sulfamethoxazole: R >2/38      Method Type: ALISHA        RADIOLOGY & ADDITIONAL TESTS:  Reviewed

## 2018-03-13 NOTE — PROGRESS NOTE ADULT - ASSESSMENT
#Abdominal abscess  - Would stop cipro  - Would suggest ertapenem 1gm once per day IV  - Can continue linezolid for gram Positives in previous isolates    Case discussed with Dr. Lux and primary team

## 2018-03-13 NOTE — PHYSICAL THERAPY INITIAL EVALUATION ADULT - ADDITIONAL COMMENTS
Pt lives at home with spouse with few CHARLES, no elevator. PTA: pt was in rehab for PT and amb short distances with RW with 2 person assist as pr pt. Otherwise pt is independent no AD prior to her surgery.

## 2018-03-13 NOTE — PROGRESS NOTE ADULT - ATTENDING COMMENTS
ID Att    Unfortunately, her fluid collection is growing ESBL E. coli, (R) cipro    Rec:  1. continue Linezolid pending final Cultures  2. D/C Ciproflox  3. ertapenem 1 gm iv q 24h    Will discuss with team

## 2018-03-13 NOTE — PHYSICAL THERAPY INITIAL EVALUATION ADULT - GAIT DEVIATIONS NOTED, PT EVAL
decreased stride length/difficulty with Left> right LE advancement/decreased mayito/decreased step length

## 2018-03-13 NOTE — PHYSICAL THERAPY INITIAL EVALUATION ADULT - LEVEL OF INDEPENDENCE: STAND/SIT, REHAB EVAL
minimum assist (75% patients effort)/VCing for control of descent and hand placement/sequencing/moderate assist (50% patients effort)

## 2018-03-13 NOTE — PROGRESS NOTE ADULT - SUBJECTIVE AND OBJECTIVE BOX
O/N: awaiting MICHELE placement, repeat H/H 7.8/26.8. VSS. BRAYDEN  3/12: IR drained abdominal wall specimen: gram - rods, gram + cocci in pairs and chains. Fluids discontinued per Dr. Brady. OVERNIGHT EVENTS: awaiting MICHELE placement, repeat H/H 7.8/26.8. VSS. BRAYDEN  3/12: IR drained abdominal wall specimen: gram - rods, gram + cocci in pairs and chains. Fluids discontinued per Dr. Brady.       STATUS POST:  IR drainage     POST OPERATIVE DAY #: 1    SUBJECTIVE: Patient examined bedside with Dr. Brady she  denies any complaints   Flatus: [] YES []X NO             Bowel Movement: [ ] YES [ X] NO  Pain (0-10):            Pain Control Adequate: [X ] YES [ ] NO  Nausea: [ ] YES [ X] NO            Vomiting: [ ] YES [X ] NO  Diarrhea: [ ] YES [X ] NO         Constipation: [ ] YES [X ] NO     Chest Pain: [ ] YES [X ] NO    SOB:  [ ] YES [X ] NO    ciprofloxacin     Tablet 750 milliGRAM(s) Oral every 12 hours  heparin  Injectable 5000 Unit(s) SubCutaneous every 8 hours  linezolid    Tablet 600 milliGRAM(s) Oral every 12 hours  losartan 50 milliGRAM(s) Oral daily      Vital Signs Last 24 Hrs  T(C): 37 (13 Mar 2018 05:31), Max: 37.7 (12 Mar 2018 13:42)  T(F): 98.6 (13 Mar 2018 05:31), Max: 99.9 (12 Mar 2018 13:42)  HR: 68 (13 Mar 2018 05:31) (67 - 72)  BP: 154/92 (13 Mar 2018 05:31) (137/86 - 154/92)  BP(mean): --  RR: 16 (13 Mar 2018 05:31) (16 - 18)  SpO2: 93% (13 Mar 2018 05:31) (92% - 95%)  I&O's Detail    12 Mar 2018 07:01  -  13 Mar 2018 07:00  --------------------------------------------------------  IN:    Oral Fluid: 360 mL    sodium chloride 0.9%: 1250 mL  Total IN: 1610 mL    OUT:    Bulb: 10 mL  Total OUT: 10 mL    Total NET: 1600 mL          General: NAD, resting comfortably in bed  C/V: NSR  Pulm: Nonlabored breathing, no respiratory distress  Abd: Soft, non-distended, TTP around incision site, incision clean dry and intact  Extrem: WWP, no edema, SCDs in place  Drains: sanguineous flood          LABS:                        7.8    5.8   )-----------( 326      ( 12 Mar 2018 20:22 )             26.8     03-12    142  |  110<H>  |  15  ----------------------------<  76  3.7   |  21<L>  |  0.87    Ca    7.7<L>      12 Mar 2018 06:45  Phos  3.5     03-12  Mg     1.9     03-12

## 2018-03-13 NOTE — PHYSICAL THERAPY INITIAL EVALUATION ADULT - GENERAL OBSERVATIONS, REHAB EVAL
Rec'd pt supine in bed, non-acute distress, +heplock, +CHECO x 1, cleared for PT and OOB activity by RN Emmy and Bria resident. Denies pain at this time.

## 2018-03-13 NOTE — PROGRESS NOTE ADULT - SUBJECTIVE AND OBJECTIVE BOX
Azucena appears well this am and is afebrile with no elevation of wbc and appearance of old hematoma in drain  no drainage through the wound  Have been in touch with ID and can discharge on cipro and linazaloid if bed available at rehab  rehab bed denied yesterday  was admitted and had drainage of sub cutaneous collection with gm negative rods and gm positive cocci  reviewed ct of 2/15 and not there so seems to have developed following  was sent to er from rehab because of drainage from wound no sepsis  sent her from [facility] and we did ct scan and had drained  needs to be on 5 small meals per day  with high protein  also on vitamins  best to place asap  hct is stable and drop appears to have been dilutional

## 2018-03-13 NOTE — PHYSICAL THERAPY INITIAL EVALUATION ADULT - PERTINENT HX OF CURRENT PROBLEM, REHAB EVAL
55 y/o F with extensive past history medical/surgical Since discharge after this surgery she has had a left lower quadrant abdominal collection that has been draining, with multiple ED and office visits. Despite packing/drainage attempts this has persisted and she presents for this issue.

## 2018-03-14 LAB
-  AMPICILLIN: SIGNIFICANT CHANGE UP
-  DAPTOMYCIN: SIGNIFICANT CHANGE UP
-  LINEZOLID: SIGNIFICANT CHANGE UP
-  VANCOMYCIN: SIGNIFICANT CHANGE UP
ANION GAP SERPL CALC-SCNC: 13 MMOL/L — SIGNIFICANT CHANGE UP (ref 5–17)
BUN SERPL-MCNC: 9 MG/DL — SIGNIFICANT CHANGE UP (ref 7–23)
CALCIUM SERPL-MCNC: 8.4 MG/DL — SIGNIFICANT CHANGE UP (ref 8.4–10.5)
CHLORIDE SERPL-SCNC: 104 MMOL/L — SIGNIFICANT CHANGE UP (ref 96–108)
CO2 SERPL-SCNC: 22 MMOL/L — SIGNIFICANT CHANGE UP (ref 22–31)
CREAT SERPL-MCNC: 0.84 MG/DL — SIGNIFICANT CHANGE UP (ref 0.5–1.3)
GLUCOSE SERPL-MCNC: 74 MG/DL — SIGNIFICANT CHANGE UP (ref 70–99)
HCT VFR BLD CALC: 27.4 % — LOW (ref 34.5–45)
HGB BLD-MCNC: 8.2 G/DL — LOW (ref 11.5–15.5)
MAGNESIUM SERPL-MCNC: 2.2 MG/DL — SIGNIFICANT CHANGE UP (ref 1.6–2.6)
MCHC RBC-ENTMCNC: 27.2 PG — SIGNIFICANT CHANGE UP (ref 27–34)
MCHC RBC-ENTMCNC: 29.9 G/DL — LOW (ref 32–36)
MCV RBC AUTO: 90.7 FL — SIGNIFICANT CHANGE UP (ref 80–100)
METHOD TYPE: SIGNIFICANT CHANGE UP
PHOSPHATE SERPL-MCNC: 2.9 MG/DL — SIGNIFICANT CHANGE UP (ref 2.5–4.5)
PLATELET # BLD AUTO: 338 K/UL — SIGNIFICANT CHANGE UP (ref 150–400)
POTASSIUM SERPL-MCNC: 4.2 MMOL/L — SIGNIFICANT CHANGE UP (ref 3.5–5.3)
POTASSIUM SERPL-SCNC: 4.2 MMOL/L — SIGNIFICANT CHANGE UP (ref 3.5–5.3)
RBC # BLD: 3.02 M/UL — LOW (ref 3.8–5.2)
RBC # FLD: 17 % — HIGH (ref 10.3–16.9)
SODIUM SERPL-SCNC: 139 MMOL/L — SIGNIFICANT CHANGE UP (ref 135–145)
WBC # BLD: 5.3 K/UL — SIGNIFICANT CHANGE UP (ref 3.8–10.5)
WBC # FLD AUTO: 5.3 K/UL — SIGNIFICANT CHANGE UP (ref 3.8–10.5)

## 2018-03-14 PROCEDURE — G0365: CPT | Mod: 26

## 2018-03-14 RX ORDER — PREGABALIN 225 MG/1
1000 CAPSULE ORAL
Qty: 0 | Refills: 0 | Status: DISCONTINUED | OUTPATIENT
Start: 2018-03-14 | End: 2018-03-16

## 2018-03-14 RX ORDER — ACETAMINOPHEN 500 MG
650 TABLET ORAL ONCE
Qty: 0 | Refills: 0 | Status: COMPLETED | OUTPATIENT
Start: 2018-03-14 | End: 2018-03-14

## 2018-03-14 RX ORDER — CALCIUM CARBONATE 500(1250)
1 TABLET ORAL DAILY
Qty: 0 | Refills: 0 | Status: DISCONTINUED | OUTPATIENT
Start: 2018-03-14 | End: 2018-03-16

## 2018-03-14 RX ADMIN — LINEZOLID 600 MILLIGRAM(S): 600 INJECTION, SOLUTION INTRAVENOUS at 21:24

## 2018-03-14 RX ADMIN — HEPARIN SODIUM 5000 UNIT(S): 5000 INJECTION INTRAVENOUS; SUBCUTANEOUS at 05:09

## 2018-03-14 RX ADMIN — ERTAPENEM SODIUM 120 MILLIGRAM(S): 1 INJECTION, POWDER, LYOPHILIZED, FOR SOLUTION INTRAMUSCULAR; INTRAVENOUS at 18:16

## 2018-03-14 RX ADMIN — MIRTAZAPINE 7.5 MILLIGRAM(S): 45 TABLET, ORALLY DISINTEGRATING ORAL at 21:23

## 2018-03-14 RX ADMIN — Medication 1 TABLET(S): at 12:26

## 2018-03-14 RX ADMIN — ESCITALOPRAM OXALATE 20 MILLIGRAM(S): 10 TABLET, FILM COATED ORAL at 13:52

## 2018-03-14 RX ADMIN — HEPARIN SODIUM 5000 UNIT(S): 5000 INJECTION INTRAVENOUS; SUBCUTANEOUS at 21:24

## 2018-03-14 RX ADMIN — LINEZOLID 600 MILLIGRAM(S): 600 INJECTION, SOLUTION INTRAVENOUS at 10:19

## 2018-03-14 RX ADMIN — Medication 650 MILLIGRAM(S): at 02:30

## 2018-03-14 RX ADMIN — LOSARTAN POTASSIUM 50 MILLIGRAM(S): 100 TABLET, FILM COATED ORAL at 05:10

## 2018-03-14 RX ADMIN — PREGABALIN 1000 MICROGRAM(S): 225 CAPSULE ORAL at 12:26

## 2018-03-14 RX ADMIN — Medication 1 TABLET(S): at 12:27

## 2018-03-14 RX ADMIN — Medication 5 MILLIGRAM(S): at 21:24

## 2018-03-14 RX ADMIN — Medication 650 MILLIGRAM(S): at 01:54

## 2018-03-14 RX ADMIN — Medication 25 MILLIGRAM(S): at 21:23

## 2018-03-14 RX ADMIN — HEPARIN SODIUM 5000 UNIT(S): 5000 INJECTION INTRAVENOUS; SUBCUTANEOUS at 13:51

## 2018-03-14 NOTE — PROGRESS NOTE ADULT - ASSESSMENT
#Abdominal abscess  - Would stop cipro  - May be able to de-escalate to single medication regimen pending culture results, for now continue linezolid and ertapenem as dosed    Full recs to follow this afternoon #Abdominal abscess  - Would stop cipro  - May be able to de-escalate to single medication regimen pending culture results, for now continue linezolid and ertapenem as dosed  - Will follow up final culture results tomorrow    Case discussed with attending and primary team

## 2018-03-14 NOTE — DIETITIAN INITIAL EVALUATION ADULT. - ORAL INTAKE PTA
poor/Pt has minimal intake- 2/2 lack of desire to eat. Per dietary recall pt had orange today. Is asking for bagel and cream cheese.

## 2018-03-14 NOTE — DIETITIAN INITIAL EVALUATION ADULT. - ADHERENCE
poor/Pt has been encouraged to consume protein dense foods- has not been compliant. Opts for juice, bread and fruits.

## 2018-03-14 NOTE — DIETITIAN INITIAL EVALUATION ADULT. - ENERGY NEEDS
Height: 5'3" Weight: 164lbs, IBW 115lbs+/-10%, %%, BMI 29  IBW used for calculations as pt >120% of IBW   Nutrient needs based on [facility] standards of care for maintenance in adults.   Needs adjusted 2/2 extensive surgical history and prolonged inadequate kcal/pro intake

## 2018-03-14 NOTE — PROGRESS NOTE ADULT - SUBJECTIVE AND OBJECTIVE BOX
O/N: Dr. Burns recs PICC placement with IR under fluoroscopy. VSS. BRAYDEN  /13: Cx from wound is growing ESBL. ID is recommending IV ertapenem for 2 weeks. OVERNIGHT EVENTS: Dr. Burns recs PICC placement with IR under fluoroscopy. VSS. BRAYDEN  /13: Cx from wound is growing ESBL. ID is recommending IV ertapenem for 2 weeks.          SUBJECTIVE: Patient examined bedside with Dr. Brady . Patient denies any complaints  Flatus: [X] YES [] NO             Bowel Movement: [ X] YES [ ] NO  Pain (0-10):            Pain Control Adequate: [X ] YES [ ] NO  Nausea: [ ] YES [X ] NO            Vomiting: [ ] YES [X ] NO  Diarrhea: [ ] YES [ X] NO         Constipation: [ ] YES [X ] NO     Chest Pain: [ ] YES [X ] NO    SOB:  [ ] YES [X ] NO    ertapenem  IVPB 1000 milliGRAM(s) IV Intermittent every 24 hours  ertapenem  IVPB      heparin  Injectable 5000 Unit(s) SubCutaneous every 8 hours  linezolid    Tablet 600 milliGRAM(s) Oral every 12 hours  losartan 50 milliGRAM(s) Oral daily      Vital Signs Last 24 Hrs  T(C): 36.6 (14 Mar 2018 05:52), Max: 37 (13 Mar 2018 20:10)  T(F): 97.8 (14 Mar 2018 05:52), Max: 98.6 (13 Mar 2018 20:10)  HR: 62 (14 Mar 2018 05:52) (62 - 76)  BP: 149/92 (14 Mar 2018 05:52) (133/91 - 149/92)  BP(mean): --  RR: 17 (14 Mar 2018 05:52) (17 - 18)  SpO2: 94% (14 Mar 2018 05:52) (93% - 94%)  I&O's Detail    13 Mar 2018 07:01  -  14 Mar 2018 07:00  --------------------------------------------------------  IN:    Oral Fluid: 600 mL    Solution: 200 mL  Total IN: 800 mL    OUT:    Bulb: 47.5 mL    Voided: 1250 mL  Total OUT: 1297.5 mL    Total NET: -497.5 mL          General: NAD, resting comfortably in bed  C/V: NSR  Pulm: Nonlabored breathing, no respiratory distress  Abd: soft, NT/ND.  Extrem: WWP, no edema, SCDs in place  Drains: sanguinous fluid         LABS:                        8.2    5.3   )-----------( 338      ( 14 Mar 2018 07:02 )             27.4     03-14    139  |  104  |  9   ----------------------------<  74  4.2   |  22  |  0.84    Ca    8.4      14 Mar 2018 07:02  Phos  2.9     03-14  Mg     2.2     03-14    TPro  5.8<L>  /  Alb  2.3<L>  /  TBili  0.4  /  DBili  x   /  AST  17  /  ALT  17  /  AlkPhos  194<H>  03-13

## 2018-03-14 NOTE — PROGRESS NOTE ADULT - ATTENDING COMMENTS
ID Attending    Clinically stable.    We are awaiting susceptibilities on the enterococcus , which we expect to ve VRE.    We will continue linezolid and ertapenem at present

## 2018-03-14 NOTE — DIETITIAN INITIAL EVALUATION ADULT. - OTHER INFO
Pt known to RD. Has PMH of longstanding enterocutaneous fistulas. Recently here for prolonged hospital course w/ most recent procedure being 1/30: Ex-lap, resection of EC fistula, SBR x2 with patino hand sewn anastomosis, placement of 2 JPs (SQ) placement, primary abdominal closure with b/l external oblique release and prevena placement. Pt now w/ LLQ abdominal collection that has been draining, w/ multiple ED & office visits. Pt seen lethargic in bed. Reponses primarily in nods. Per dietary recall, pt had an orange for breakfast and was requesting a bagel w/ cream cheese. Discussed w/ pt increased protein needs given extensive surgical hx and prioritizing those foods. Reviewed in depth all protein options offered at the hospital- pt declined all of them except turkey slices and chocolate EnsureEnlives. Pt started on Calorie Count today. Diet preferences communicated w/  Team 2, her RN and the [facility] foodservice host. Discussed adding oscal, B12 and MVI to orders w/ team as well. Pt reported being nauseous and attributed it to being hungry. No other GI distress. Has not had BM today. NKFA or surgical incision. Skin: surgical incision; pascual score of 15.

## 2018-03-14 NOTE — PROGRESS NOTE ADULT - SUBJECTIVE AND OBJECTIVE BOX
INTERVAL HPI/OVERNIGHT EVENTS:  Patient was seen and examined at bedside. As per nurse and patient, no o/n events, patient resting comfortably. No complaints at this time. Wants to leave. Patient denies: fever, chills, dizziness, weakness, HA, Changes in vision, CP, palpitations, SOB, cough, N/V/D/C, dysuria, changes in bowel movements, LE edema. ROS otherwise negative.    VITAL SIGNS:  T(F): 97.9 (03-14-18 @ 10:10)  HR: 72 (03-14-18 @ 10:10)  BP: 142/83 (03-14-18 @ 10:10)  RR: 16 (03-14-18 @ 10:10)  SpO2: 95% (03-14-18 @ 10:10)  Wt(kg): --    PHYSICAL EXAM:    Constitutional: WDWN, NAD  HEENT: PERRL, EOMI, sclera non-icteric, neck supple, trachea midline, no masses, no JVD, MMM, good dentition  Respiratory: CTA b/l, good air entry b/l, no wheezing, no rhonchi, no rales, without accessory muscle use and no intercostal retractions  Cardiovascular: RRR, normal S1S2, no M/R/G  Gastrointestinal: soft, NTND, no masses palpable, BS normal  Extremities: Warm, well perfused, pulses equal bilateral upper and lower extremities, no edema, no clubbing  Neurological: AAOx3, CN Grossly intact  Skin: Normal temperature, warm, dry    ACTIVE ANTIMICROBIALS/ANTIBIOTICS:  ertapenem  IVPB 1000 milliGRAM(s) IV Intermittent every 24 hours  ertapenem  IVPB      linezolid    Tablet 600 milliGRAM(s) Oral every 12 hours        Allergies    sulfa drugs (Unknown)  sulfamethoxazole (Other)    Intolerances        LABS:                        8.2    5.3   )-----------( 338      ( 14 Mar 2018 07:02 )             27.4     03-14    139  |  104  |  9   ----------------------------<  74  4.2   |  22  |  0.84    Ca    8.4      14 Mar 2018 07:02  Phos  2.9     03-14  Mg     2.2     03-14    TPro  5.8<L>  /  Alb  2.3<L>  /  TBili  0.4  /  DBili  x   /  AST  17  /  ALT  17  /  AlkPhos  194<H>  03-13            CURRENT CULTURE RESULTS:      RADIOLOGY & ADDITIONAL TESTS:  Reviewed

## 2018-03-14 NOTE — PROGRESS NOTE ADULT - PROVIDER SPECIALTY LIST ADULT
Surgery Discontinue Regimen: Clobetasol 0.05% topical cream-AAA rash BID 2 weeks on, 1 week off, repeat prn flares Continue Regimen: TAC 0.1% cr AAA - rash QD 2 weeks on, 1 week off, repeat prn flares. Avoid face, groin, underarms Initiate Treatment: Minocycline 100mg BID Qd x 1 month Detail Level: Simple Plan: Discussed with Pt that her GA outbreaks may be closely correlated with her diabetes, specifically if her sugars are high then it could cause flares. Pt has an appointment to f/u with her endocrinologist this week. Discussed R/B/S/E of Plaquenil but Pt and JE prefer to try MNC first.

## 2018-03-14 NOTE — DIETITIAN INITIAL EVALUATION ADULT. - NS AS NUTRI INTERV ED CONTENT
Discussed w/ pt increased protein needs given extensive surgical hx and prioritizing those foods. Limited food preferences taken- communicated w/ team (Team 2, RN and Food host)

## 2018-03-14 NOTE — PROGRESS NOTE ADULT - ASSESSMENT
55 yo female w/PMHx of HTN, gastric bypass in 2002 complicated by stricture awith extensive history admitted with left lower quadrant abdominal abscess that has been draining    pain/nausea control  CLD/IVF  IV clindamycin  SCDs  AM labs 57 yo female w/PMHx of HTN, gastric bypass in 2002 complicated by stricture awith extensive history admitted with left lower quadrant abdominal abscess that has been draining    pain/nausea control  CLD/IVF  IV clindamycin  SCDs  AM labs  PICC Line placement   Calorie count   C/W Ertapenem   Patient examined beside and plan discussed in detail with Dr. Brady.

## 2018-03-15 LAB
ANION GAP SERPL CALC-SCNC: 13 MMOL/L — SIGNIFICANT CHANGE UP (ref 5–17)
BUN SERPL-MCNC: 9 MG/DL — SIGNIFICANT CHANGE UP (ref 7–23)
CALCIUM SERPL-MCNC: 8.5 MG/DL — SIGNIFICANT CHANGE UP (ref 8.4–10.5)
CHLORIDE SERPL-SCNC: 103 MMOL/L — SIGNIFICANT CHANGE UP (ref 96–108)
CO2 SERPL-SCNC: 23 MMOL/L — SIGNIFICANT CHANGE UP (ref 22–31)
CREAT SERPL-MCNC: 0.82 MG/DL — SIGNIFICANT CHANGE UP (ref 0.5–1.3)
CULTURE RESULTS: SIGNIFICANT CHANGE UP
CULTURE RESULTS: SIGNIFICANT CHANGE UP
GLUCOSE SERPL-MCNC: 79 MG/DL — SIGNIFICANT CHANGE UP (ref 70–99)
HCT VFR BLD CALC: 27.4 % — LOW (ref 34.5–45)
HGB BLD-MCNC: 8.2 G/DL — LOW (ref 11.5–15.5)
MAGNESIUM SERPL-MCNC: 2.3 MG/DL — SIGNIFICANT CHANGE UP (ref 1.6–2.6)
MCHC RBC-ENTMCNC: 26.8 PG — LOW (ref 27–34)
MCHC RBC-ENTMCNC: 29.9 G/DL — LOW (ref 32–36)
MCV RBC AUTO: 89.5 FL — SIGNIFICANT CHANGE UP (ref 80–100)
PHOSPHATE SERPL-MCNC: 3.4 MG/DL — SIGNIFICANT CHANGE UP (ref 2.5–4.5)
PLATELET # BLD AUTO: 356 K/UL — SIGNIFICANT CHANGE UP (ref 150–400)
POTASSIUM SERPL-MCNC: 4 MMOL/L — SIGNIFICANT CHANGE UP (ref 3.5–5.3)
POTASSIUM SERPL-SCNC: 4 MMOL/L — SIGNIFICANT CHANGE UP (ref 3.5–5.3)
RBC # BLD: 3.06 M/UL — LOW (ref 3.8–5.2)
RBC # FLD: 16.6 % — SIGNIFICANT CHANGE UP (ref 10.3–16.9)
SODIUM SERPL-SCNC: 139 MMOL/L — SIGNIFICANT CHANGE UP (ref 135–145)
SPECIMEN SOURCE: SIGNIFICANT CHANGE UP
SPECIMEN SOURCE: SIGNIFICANT CHANGE UP
WBC # BLD: 5.5 K/UL — SIGNIFICANT CHANGE UP (ref 3.8–10.5)
WBC # FLD AUTO: 5.5 K/UL — SIGNIFICANT CHANGE UP (ref 3.8–10.5)

## 2018-03-15 PROCEDURE — 36569 INSJ PICC 5 YR+ W/O IMAGING: CPT

## 2018-03-15 PROCEDURE — 76937 US GUIDE VASCULAR ACCESS: CPT | Mod: 26

## 2018-03-15 PROCEDURE — 77001 FLUOROGUIDE FOR VEIN DEVICE: CPT | Mod: 26

## 2018-03-15 RX ADMIN — HEPARIN SODIUM 5000 UNIT(S): 5000 INJECTION INTRAVENOUS; SUBCUTANEOUS at 05:13

## 2018-03-15 RX ADMIN — Medication 25 MILLIGRAM(S): at 21:27

## 2018-03-15 RX ADMIN — MIRTAZAPINE 7.5 MILLIGRAM(S): 45 TABLET, ORALLY DISINTEGRATING ORAL at 21:27

## 2018-03-15 RX ADMIN — LINEZOLID 600 MILLIGRAM(S): 600 INJECTION, SOLUTION INTRAVENOUS at 10:32

## 2018-03-15 RX ADMIN — Medication 5 MILLIGRAM(S): at 21:50

## 2018-03-15 RX ADMIN — LINEZOLID 600 MILLIGRAM(S): 600 INJECTION, SOLUTION INTRAVENOUS at 21:28

## 2018-03-15 RX ADMIN — Medication 1 TABLET(S): at 12:02

## 2018-03-15 RX ADMIN — ESCITALOPRAM OXALATE 20 MILLIGRAM(S): 10 TABLET, FILM COATED ORAL at 14:11

## 2018-03-15 RX ADMIN — HEPARIN SODIUM 5000 UNIT(S): 5000 INJECTION INTRAVENOUS; SUBCUTANEOUS at 14:11

## 2018-03-15 RX ADMIN — ERTAPENEM SODIUM 120 MILLIGRAM(S): 1 INJECTION, POWDER, LYOPHILIZED, FOR SOLUTION INTRAMUSCULAR; INTRAVENOUS at 21:27

## 2018-03-15 RX ADMIN — HEPARIN SODIUM 5000 UNIT(S): 5000 INJECTION INTRAVENOUS; SUBCUTANEOUS at 21:28

## 2018-03-15 RX ADMIN — LOSARTAN POTASSIUM 50 MILLIGRAM(S): 100 TABLET, FILM COATED ORAL at 05:13

## 2018-03-15 NOTE — PROGRESS NOTE ADULT - SUBJECTIVE AND OBJECTIVE BOX
INTERVAL HPI/OVERNIGHT EVENTS:  Patient was seen and examined at bedside. As per nurse and patient, no o/n events, patient resting comfortably. No complaints at this time, wants to know when she can leave. Patient denies: fever, chills, dizziness, weakness, HA, Changes in vision, CP, palpitations, SOB, cough, N/V/D/C, dysuria, changes in bowel movements, LE edema. ROS otherwise negative.    VITAL SIGNS:  T(F): 96.2 (03-15-18 @ 08:40)  HR: 70 (03-15-18 @ 08:40)  BP: 137/84 (03-15-18 @ 08:40)  RR: 17 (03-15-18 @ 08:40)  SpO2: 93% (03-15-18 @ 08:40)  Wt(kg): --    PHYSICAL EXAM:    Constitutional: WDWN, NAD  HEENT: PERRL, EOMI, sclera non-icteric, neck supple, trachea midline, no masses, no JVD, MMM, good dentition  Respiratory: CTA b/l, good air entry b/l, no wheezing, no rhonchi, no rales, without accessory muscle use and no intercostal retractions  Cardiovascular: RRR, normal S1S2, no M/R/G  Gastrointestinal: soft, NTND, no masses palpable, BS normal, bandage clear no strikethrough, casey drain in place  Extremities: Warm, well perfused, pulses equal bilateral upper and lower extremities, no edema, no clubbing  Neurological: AAOx3, CN Grossly intact  Skin: Normal temperature, warm, dry    ACTIVE ANTIMICROBIALS/ANTIBIOTICS:  ertapenem  IVPB 1000 milliGRAM(s) IV Intermittent every 24 hours  ertapenem  IVPB      linezolid    Tablet 600 milliGRAM(s) Oral every 12 hours        Allergies    sulfa drugs (Unknown)  sulfamethoxazole (Other)    Intolerances        LABS:                        8.2    5.5   )-----------( 356      ( 15 Mar 2018 06:36 )             27.4     03-15    139  |  103  |  9   ----------------------------<  79  4.0   |  23  |  0.82    Ca    8.5      15 Mar 2018 06:36  Phos  3.4     03-15  Mg     2.3     03-15              CURRENT CULTURE RESULTS:      RADIOLOGY & ADDITIONAL TESTS:  Reviewed

## 2018-03-15 NOTE — PROGRESS NOTE ADULT - ASSESSMENT
57 yo female w/PMHx of HTN, gastric bypass in 2002 complicated by stricture awith extensive history admitted with left lower quadrant abdominal abscess that has been draining    pain/nausea control  CLD/IVF  IV clindamycin  SCDs  AM labs 55 yo female w/PMHx of HTN, gastric bypass in 2002 complicated by stricture awith extensive history admitted with left lower quadrant abdominal abscess that has been draining    pain/nausea control  CLD/IVF  IV clindamycin  SCDs  AM labs  PICC line placement today.

## 2018-03-15 NOTE — PROGRESS NOTE ADULT - SUBJECTIVE AND OBJECTIVE BOX
O/N: US vessel mapping performed, BRAYDEN   3/14: PER Dr. Duggan Upper EXT  duplex must be performed before PICC LINE PLACEMENT,  PICC line placement tomorrow, calorie count started O/N: US vessel mapping performed, BRAYDEN   3/14: PER Dr. Thomas Upper EXT  duplex must be performed before PICC LINE PLACEMENT,  PICC line placement tomorrow, calorie count started       SUBJECTIVE: Patient seen and examined bedside by chief resident. Afebrile BRAYDEN BEARDEN.    ertapenem  IVPB 1000 milliGRAM(s) IV Intermittent every 24 hours  ertapenem  IVPB      heparin  Injectable 5000 Unit(s) SubCutaneous every 8 hours  linezolid    Tablet 600 milliGRAM(s) Oral every 12 hours  losartan 50 milliGRAM(s) Oral daily      Vital Signs Last 24 Hrs  T(C): 36.3 (15 Mar 2018 05:53), Max: 36.7 (14 Mar 2018 16:49)  T(F): 97.4 (15 Mar 2018 05:53), Max: 98.1 (14 Mar 2018 21:20)  HR: 70 (15 Mar 2018 05:53) (70 - 86)  BP: 132/87 (15 Mar 2018 05:53) (127/81 - 149/93)  BP(mean): --  RR: 17 (15 Mar 2018 05:53) (16 - 18)  SpO2: 94% (15 Mar 2018 05:53) (93% - 96%)  I&O's Detail    14 Mar 2018 07:01  -  15 Mar 2018 07:00  --------------------------------------------------------  IN:    Oral Fluid: 730 mL  Total IN: 730 mL    OUT:    Bulb: 20 mL    Voided: 900 mL  Total OUT: 920 mL    Total NET: -190 mL          General: NAD, resting comfortably in bed  C/V: NSR  Pulm: Nonlabored breathing, no respiratory distress  Abd: soft, NT/ND.   Extrem: WWP, no edema, SCDs in place        LABS:                        8.2    5.5   )-----------( 356      ( 15 Mar 2018 06:36 )             27.4     03-15    139  |  103  |  9   ----------------------------<  79  4.0   |  23  |  0.82    Ca    8.5      15 Mar 2018 06:36  Phos  3.4     03-15  Mg     2.3     03-15            RADIOLOGY & ADDITIONAL STUDIES:

## 2018-03-15 NOTE — PROGRESS NOTE ADULT - ATTENDING COMMENTS
ID Att    The enterococcus is VR E. faecium (s) Linezolid    Today is day 2/14 of LNZ+ ertapenem    Rec:    1. LNZ + ertapenem for 12 more days    2. Please ask psych to d/c or reduce doses on mirtazepine and lexapro while pt is on Linezolid    3. please call if we may be of further assistance

## 2018-03-15 NOTE — PROGRESS NOTE ADULT - ASSESSMENT
#Abdominal abscess  - Would stop cipro  - May be able to de-escalate to single medication regimen pending culture results, for now continue linezolid and ertapenem as dosed    Full recs to follow #Abdominal abscess  - 2/14 of LNZ+ ertapenem  - Continue Linezolid and ertapenem for fourteen days total  - Please evaluate with psychiatry if we can d/c or reduce dose of mirtazepine and lexapro while patient is on linezolid (suggest half dose)    Will sign off, please re-consult for further questions or concerns. Patient seen and examined with attending. Case discussed with primary team.

## 2018-03-16 VITALS
TEMPERATURE: 98 F | OXYGEN SATURATION: 97 % | DIASTOLIC BLOOD PRESSURE: 78 MMHG | HEART RATE: 65 BPM | RESPIRATION RATE: 16 BRPM | SYSTOLIC BLOOD PRESSURE: 114 MMHG

## 2018-03-16 LAB
-  AMPICILLIN: SIGNIFICANT CHANGE UP
-  AZTREONAM: SIGNIFICANT CHANGE UP
-  CEFTAZIDIME: SIGNIFICANT CHANGE UP
-  CIPROFLOXACIN: SIGNIFICANT CHANGE UP
-  CIPROFLOXACIN: SIGNIFICANT CHANGE UP
-  GENTAMICIN: SIGNIFICANT CHANGE UP
-  IMIPENEM: SIGNIFICANT CHANGE UP
-  LINEZOLID: SIGNIFICANT CHANGE UP
-  PIPERACILLIN/TAZOBACTAM: SIGNIFICANT CHANGE UP
-  TETRACYCLINE: SIGNIFICANT CHANGE UP
-  TOBRAMYCIN: SIGNIFICANT CHANGE UP
-  VANCOMYCIN: SIGNIFICANT CHANGE UP
ANION GAP SERPL CALC-SCNC: 13 MMOL/L — SIGNIFICANT CHANGE UP (ref 5–17)
BUN SERPL-MCNC: 11 MG/DL — SIGNIFICANT CHANGE UP (ref 7–23)
CALCIUM SERPL-MCNC: 8.4 MG/DL — SIGNIFICANT CHANGE UP (ref 8.4–10.5)
CHLORIDE SERPL-SCNC: 103 MMOL/L — SIGNIFICANT CHANGE UP (ref 96–108)
CO2 SERPL-SCNC: 22 MMOL/L — SIGNIFICANT CHANGE UP (ref 22–31)
CREAT SERPL-MCNC: 0.85 MG/DL — SIGNIFICANT CHANGE UP (ref 0.5–1.3)
CULTURE RESULTS: SIGNIFICANT CHANGE UP
CULTURE RESULTS: SIGNIFICANT CHANGE UP
GLUCOSE SERPL-MCNC: 76 MG/DL — SIGNIFICANT CHANGE UP (ref 70–99)
HCT VFR BLD CALC: 27.5 % — LOW (ref 34.5–45)
HGB BLD-MCNC: 8.1 G/DL — LOW (ref 11.5–15.5)
MAGNESIUM SERPL-MCNC: 2.3 MG/DL — SIGNIFICANT CHANGE UP (ref 1.6–2.6)
MCHC RBC-ENTMCNC: 26.5 PG — LOW (ref 27–34)
MCHC RBC-ENTMCNC: 29.5 G/DL — LOW (ref 32–36)
MCV RBC AUTO: 89.9 FL — SIGNIFICANT CHANGE UP (ref 80–100)
METHOD TYPE: SIGNIFICANT CHANGE UP
METHOD TYPE: SIGNIFICANT CHANGE UP
ORGANISM # SPEC MICROSCOPIC CNT: SIGNIFICANT CHANGE UP
PHOSPHATE SERPL-MCNC: 3.3 MG/DL — SIGNIFICANT CHANGE UP (ref 2.5–4.5)
PLATELET # BLD AUTO: 363 K/UL — SIGNIFICANT CHANGE UP (ref 150–400)
POTASSIUM SERPL-MCNC: 4.2 MMOL/L — SIGNIFICANT CHANGE UP (ref 3.5–5.3)
POTASSIUM SERPL-SCNC: 4.2 MMOL/L — SIGNIFICANT CHANGE UP (ref 3.5–5.3)
RBC # BLD: 3.06 M/UL — LOW (ref 3.8–5.2)
RBC # FLD: 16.8 % — SIGNIFICANT CHANGE UP (ref 10.3–16.9)
SODIUM SERPL-SCNC: 138 MMOL/L — SIGNIFICANT CHANGE UP (ref 135–145)
SPECIMEN SOURCE: SIGNIFICANT CHANGE UP
SPECIMEN SOURCE: SIGNIFICANT CHANGE UP
WBC # BLD: 5.9 K/UL — SIGNIFICANT CHANGE UP (ref 3.8–10.5)
WBC # FLD AUTO: 5.9 K/UL — SIGNIFICANT CHANGE UP (ref 3.8–10.5)

## 2018-03-16 PROCEDURE — 76937 US GUIDE VASCULAR ACCESS: CPT

## 2018-03-16 PROCEDURE — 86900 BLOOD TYPING SEROLOGIC ABO: CPT

## 2018-03-16 PROCEDURE — 87070 CULTURE OTHR SPECIMN AEROBIC: CPT

## 2018-03-16 PROCEDURE — 36569 INSJ PICC 5 YR+ W/O IMAGING: CPT

## 2018-03-16 PROCEDURE — 77001 FLUOROGUIDE FOR VEIN DEVICE: CPT

## 2018-03-16 PROCEDURE — 85730 THROMBOPLASTIN TIME PARTIAL: CPT

## 2018-03-16 PROCEDURE — G0365: CPT

## 2018-03-16 PROCEDURE — 85027 COMPLETE CBC AUTOMATED: CPT

## 2018-03-16 PROCEDURE — 85610 PROTHROMBIN TIME: CPT

## 2018-03-16 PROCEDURE — 49406 IMAGE CATH FLUID PERI/RETRO: CPT

## 2018-03-16 PROCEDURE — 84100 ASSAY OF PHOSPHORUS: CPT

## 2018-03-16 PROCEDURE — 86850 RBC ANTIBODY SCREEN: CPT

## 2018-03-16 PROCEDURE — 87184 SC STD DISK METHOD PER PLATE: CPT

## 2018-03-16 PROCEDURE — C1751: CPT

## 2018-03-16 PROCEDURE — C1769: CPT

## 2018-03-16 PROCEDURE — 87040 BLOOD CULTURE FOR BACTERIA: CPT

## 2018-03-16 PROCEDURE — 74176 CT ABD & PELVIS W/O CONTRAST: CPT

## 2018-03-16 PROCEDURE — 83735 ASSAY OF MAGNESIUM: CPT

## 2018-03-16 PROCEDURE — 87205 SMEAR GRAM STAIN: CPT

## 2018-03-16 PROCEDURE — 80053 COMPREHEN METABOLIC PANEL: CPT

## 2018-03-16 PROCEDURE — C1729: CPT

## 2018-03-16 PROCEDURE — 87186 SC STD MICRODIL/AGAR DIL: CPT

## 2018-03-16 PROCEDURE — 87075 CULTR BACTERIA EXCEPT BLOOD: CPT

## 2018-03-16 PROCEDURE — 86901 BLOOD TYPING SEROLOGIC RH(D): CPT

## 2018-03-16 PROCEDURE — 36415 COLL VENOUS BLD VENIPUNCTURE: CPT

## 2018-03-16 PROCEDURE — 80048 BASIC METABOLIC PNL TOTAL CA: CPT

## 2018-03-16 PROCEDURE — 99285 EMERGENCY DEPT VISIT HI MDM: CPT

## 2018-03-16 PROCEDURE — 86923 COMPATIBILITY TEST ELECTRIC: CPT

## 2018-03-16 RX ORDER — TOBRAMYCIN SULFATE 40 MG/ML
0 VIAL (ML) INJECTION
Qty: 0 | Refills: 0 | COMMUNITY
Start: 2018-03-16

## 2018-03-16 RX ORDER — ERTAPENEM SODIUM 1 G/1
0 INJECTION, POWDER, LYOPHILIZED, FOR SOLUTION INTRAMUSCULAR; INTRAVENOUS
Qty: 0 | Refills: 0 | COMMUNITY
Start: 2018-03-16

## 2018-03-16 RX ORDER — TOBRAMYCIN SULFATE 40 MG/ML
150 VIAL (ML) INJECTION ONCE
Qty: 0 | Refills: 0 | Status: COMPLETED | OUTPATIENT
Start: 2018-03-16 | End: 2018-03-16

## 2018-03-16 RX ORDER — CALCIUM CARBONATE 500(1250)
1 TABLET ORAL
Qty: 0 | Refills: 0 | COMMUNITY
Start: 2018-03-16

## 2018-03-16 RX ADMIN — Medication 107.5 MILLIGRAM(S): at 12:19

## 2018-03-16 RX ADMIN — LINEZOLID 600 MILLIGRAM(S): 600 INJECTION, SOLUTION INTRAVENOUS at 10:39

## 2018-03-16 RX ADMIN — HEPARIN SODIUM 5000 UNIT(S): 5000 INJECTION INTRAVENOUS; SUBCUTANEOUS at 05:23

## 2018-03-16 RX ADMIN — LOSARTAN POTASSIUM 50 MILLIGRAM(S): 100 TABLET, FILM COATED ORAL at 05:23

## 2018-03-16 RX ADMIN — Medication 1 TABLET(S): at 12:23

## 2018-03-16 NOTE — PROGRESS NOTE ADULT - SUBJECTIVE AND OBJECTIVE BOX
INFECTIOUS DISEASE SERVICE INITIAL CONSULT NOTE    HPI:  55 yo female w/PMHx of HTN, gastric bypass in 2002 complicated by stricture, corkscrewed sleeve and chronic leak, revised on 11/28/16 with protracted (70-day) postoperative course complicated by MDR infections, a C-diff infection, respiratory compromise due to plugging/PNA/effusions requiring multiple interventions and a trach, as well as a continued leak requiring a draining (double-pigtail) with stenting performed by GI. Patient had longstanding enterocutaneous fistula which she presented for resection of. She underwent a long prolonged hospital course including multiple SICU admissions and the following procedures. 7/24: SBR resection, fistula resection, revision of esophagogegunostomy drain through esophageal hiatus in the right mediastinum. 7/29: Ex lap, SB anastomosis in BP limb breakdown with succus throughout abdomen. 8/1: Abdominal washout, drainage of abscess, biologic mesh placement, closure of abdominal wall, vac and retention suture. 8/7: Abdominal washout, mesh removal frozen abdomen noted, LLQ CHECO placement with benson drain. 8/10: Leak noted from previous anastomosis site on left side, mid abdomen malecot drain placed in enterotomy, 2 JPs placed, necrotic fascia debrided, vicryl mesh sutured to fascia circumferentially, xeroform over mesh then vac dressing placed. 1/30: Ex-lap, resection of EC fistula, SBR x2 with patino hand sewn anastomosis, placement of 2 JPs (SQ) placement, primary abdominal closure with b/l external oblique release and prevena placement. Since discharge after this surgery she has had a left lower quadrant abdominal collection that has been draining, with multiple ED and office visits. Despite packing/drainage attempts this has persisted and she presents for this issue. (10 Mar 2018 22:16)      ADDITIONAL ID HPI: Reconsulted for penem resistant pseudomonas    PAST MEDICAL & SURGICAL HISTORY:  Fistula of intestine  Washed out  Reflux  Obesity  DVT (deep venous thrombosis): 2013 neck  Diverticulitis  Hypertension  Elective surgery: abodominal wall surgery  dec 2016  Elective surgery: NOV 2016 gastric bypass revision  Gastric bypass status for obesity: gastric sleeve, 6/2012  S/P breast biopsy: 2011, left  S/P colon resection: 2011  S/P knee surgery: repair 2009, 2011  right; left  Umbilical hernia: 2000  S/P appendectomy: 1975  S/P tonsillectomy: 1967      REVIEW OF SYSTEMS:  Otherwise negative other than what is stated in the HPI.    ACTIVE ANTIMICROBIAL/ANTIBIOTIC MEDICATIONS:  ertapenem  IVPB 1000 milliGRAM(s) IV Intermittent every 24 hours  ertapenem  IVPB      linezolid    Tablet 600 milliGRAM(s) Oral every 12 hours      PRIOR ANTIMICROBIAL HISTORY ON THIS ADMISSION:  ciprofloxacin     Tablet   750 milliGRAM(s) Oral (03-13-18 @ 17:18)   750 milliGRAM(s) Oral (03-13-18 @ 05:17)   750 milliGRAM(s) Oral (03-12-18 @ 17:33)    clindamycin IVPB   100 mL/Hr IV Intermittent (03-10-18 @ 23:54)    clindamycin IVPB   100 mL/Hr IV Intermittent (03-12-18 @ 15:21)   100 mL/Hr IV Intermittent (03-12-18 @ 07:02)   100 mL/Hr IV Intermittent (03-11-18 @ 23:06)   100 mL/Hr IV Intermittent (03-11-18 @ 16:23)   100 mL/Hr IV Intermittent (03-11-18 @ 06:22)    ertapenem  IVPB   120 mL/Hr IV Intermittent (03-13-18 @ 22:11)    ertapenem  IVPB   120 mL/Hr IV Intermittent (03-15-18 @ 21:27)   120 mL/Hr IV Intermittent (03-14-18 @ 18:16)    linezolid    Tablet   600 milliGRAM(s) Oral (03-16-18 @ 10:39)   600 milliGRAM(s) Oral (03-15-18 @ 21:28)   600 milliGRAM(s) Oral (03-15-18 @ 10:32)   600 milliGRAM(s) Oral (03-14-18 @ 21:24)   600 milliGRAM(s) Oral (03-14-18 @ 10:19)   600 milliGRAM(s) Oral (03-13-18 @ 22:05)   600 milliGRAM(s) Oral (03-13-18 @ 05:17)   600 milliGRAM(s) Oral (03-12-18 @ 18:36)        OTHER MEDICATIONS:  calcium carbonate 1250 mG (OsCal) 1 Tablet(s) Oral daily  cyanocobalamin 1000 MICROGram(s) Oral <User Schedule>  diazepam    Tablet 5 milliGRAM(s) Oral at bedtime  diphenhydrAMINE   Capsule 25 milliGRAM(s) Oral at bedtime PRN  escitalopram 20 milliGRAM(s) Oral daily  heparin  Injectable 5000 Unit(s) SubCutaneous every 8 hours  losartan 50 milliGRAM(s) Oral daily  mirtazapine 7.5 milliGRAM(s) Oral at bedtime  ondansetron Injectable 4 milliGRAM(s) IV Push every 6 hours PRN      ALLERGIES:  Allergies    sulfa drugs (Unknown)  sulfamethoxazole (Other)    Intolerances        SOCIAL HISTORY:    FAMILY HISTORY:  No pertinent family history in first degree relatives      VITAL SIGNS:  ICU Vital Signs Last 24 Hrs  T(C): 35.7 (16 Mar 2018 08:30), Max: 36.7 (15 Mar 2018 14:58)  T(F): 96.3 (16 Mar 2018 08:30), Max: 98.1 (15 Mar 2018 14:58)  HR: 65 (16 Mar 2018 08:30) (65 - 99)  BP: 139/83 (16 Mar 2018 08:30) (117/83 - 148/81)  BP(mean): --  ABP: --  ABP(mean): --  RR: 16 (16 Mar 2018 08:30) (16 - 18)  SpO2: 93% (16 Mar 2018 08:30) (92% - 95%)      PHYSICAL EXAM: Unchanged from prior exam, continues to have clean wounds with minor drainage    LABS:                        8.1    5.9   )-----------( 363      ( 16 Mar 2018 06:56 )             27.5     03-16    138  |  103  |  11  ----------------------------<  76  4.2   |  22  |  0.85    Ca    8.4      16 Mar 2018 06:56  Phos  3.3     03-16  Mg     2.3     03-16            MICROBIOLOGY:    Culture - Body Fluid with Gram Stain (collected 03-11-18 @ 16:32)  Source: .Body Fluid Abdominal Wall Specimen  Gram Stain (03-12-18 @ 09:33):    Numerous White blood cells    Numerous Gram Negative Rods    Moderate to Numerous Gram Positive Cocci in Pairs and Chains  Final Report (03-16-18 @ 09:52):    Numerous Escherichia coli ESBL    Result called to and read back by_ FRANK Harrell RN (9 UR) 03/13/2018 12:31    Numerous Enterococcus faecium (vancomycin resistant)    Moderate Pseudomonas aeruginosa (Carbapenem Resistant)    Result called to and read back byAlfredo JOHNSON RN  03/16/2018 09:52:19  Organism: Escherichia coli ESBL  Enterococcus faecium (vancomycin resistant)  Enterococcus faecium (vancomycin resistant)  Pseudomonas aeruginosa (Carbapenem Resistant) (03-16-18 @ 09:52)  Organism: Pseudomonas aeruginosa (Carbapenem Resistant) (03-16-18 @ 09:52)      -  Aztreonam: R >16      -  Ceftazidime: R >16      -  Ciprofloxacin: R >2      -  Gentamicin: S <=4      -  Imipenem: R >8      -  Piperacillin/Tazobactam: R >64      -  Tobramycin: S <=4      Method Type: ALISHA  Organism: Enterococcus faecium (vancomycin resistant) (03-16-18 @ 09:52)      Method Type: KB  Organism: Enterococcus faecium (vancomycin resistant) (03-16-18 @ 09:52)      -  Ampicillin: R >8      -  Daptomycin: S 4      -  Linezolid: S 2      -  Vancomycin: R >16      Method Type: ALISHA  Organism: Escherichia coli ESBL (03-16-18 @ 09:52)      -  Ampicillin: R >16      -  Ampicillin/Sulbactam: R <=8/4      -  Cefazolin: R >16      -  Ceftriaxone: R >32      -  Ciprofloxacin: R >2      -  Gentamicin: S <=4      -  Imipenem: S <=1      -  Piperacillin/Tazobactam: R <=16      -  Tobramycin: S <=4      -  Trimethoprim/Sulfamethoxazole: R >2/38      Method Type: ALISHA    Culture - Blood (collected 03-10-18 @ 21:24)  Source: .Blood Blood  Final Report (03-15-18 @ 23:01):    No growth at 5 days.    Culture - Blood (collected 03-10-18 @ 21:24)  Source: .Blood Blood  Final Report (03-15-18 @ 23:01):    No growth at 5 days.    Culture - Body Fluid with Gram Stain (collected 03-10-18 @ 21:04)  Source: .Body Fluid Abdominal Fluid  Gram Stain (03-10-18 @ 22:28):    polymorphonuclear leukocytes seen    Gram Negative Rods seen    by cytocentrifuge  Preliminary Report (03-15-18 @ 23:01):    Moderate Escherichia coli ESBL    Few Enterococcus faecium  Organism: Escherichia coli ESBL (03-12-18 @ 22:05)  Organism: Escherichia coli ESBL (03-12-18 @ 22:05)      -  Amikacin: S <=8      -  Ampicillin: R >16      -  Ampicillin/Sulbactam: R 16/8      -  Aztreonam: R >16      -  Cefazolin: R >16      -  Cefepime: R >16      -  Cefotaxime: R >32      -  Cefoxitin: S 8      -  Ceftazidime: R 16      -  Ceftriaxone: R >32      -  Cefuroxime: R >16      -  Ciprofloxacin: R >2      -  Ertapenem: S <=0.5      -  Gentamicin: S <=1      -  Imipenem: S <=1      -  Levofloxacin: R >4      -  Meropenem: S <=1      -  Piperacillin/Tazobactam: R <=8      -  Tigecycline: S <=1      -  Tobramycin: S <=2      -  Trimethoprim/Sulfamethoxazole: R >2/38      Method Type: ALISHA        RADIOLOGY & ADDITIONAL STUDIES:  Reviewed

## 2018-03-16 NOTE — PROGRESS NOTE ADULT - ASSESSMENT
57 yo female w/PMHx of HTN, gastric bypass in 2002 complicated by stricture awith extensive history admitted with left lower quadrant abdominal abscess that has been draining    Phase 3 Bariatric diet  pain/nausea control  IV clindamycin  SCDs  AM labs 55 yo female w/PMHx of HTN, gastric bypass in 2002 complicated by stricture awith extensive history admitted with left lower quadrant abdominal abscess that has been draining    Soft diet  IV tobramycin 10 days  PO linezolid for 10 days   Discharge to [facility]

## 2018-03-16 NOTE — PROGRESS NOTE ADULT - ATTENDING COMMENTS
ID Att    We have been forced to change her to tobramycin because of late growth of a carbapenem-resistant Pseudomonas.    Please follow recommendations, above.

## 2018-03-16 NOTE — PROGRESS NOTE ADULT - ASSESSMENT
#Multiple resistant organism abscess  - Given above resistances, would treat patient with the following  Tobramycin loading dose 2mg/kg (150mg) now, then 1mg/kg (75mg) q8hrs for 10 days from today  Linezolid at current dose 600mg PO Q12 hours for 10 days  Check sCr and trough in one week     Case discussed with attending and primary team

## 2018-03-16 NOTE — PROGRESS NOTE ADULT - SUBJECTIVE AND OBJECTIVE BOX
O/N: PICC line placed, BRAYDEN  3/15:  Vein mapping completed,  f/u PICC BREE placement, F/U [facility]  placement , O/N: PICC line placed, BRAYDEN  3/15:  Vein mapping completed,  f/u PICC BREE placement, F/U [facility]  placement ,     SUBJECTIVE: Patient seen and examined bedside by chief resident.  BRAYDEN, afebrile.     ertapenem  IVPB 1000 milliGRAM(s) IV Intermittent every 24 hours  ertapenem  IVPB      heparin  Injectable 5000 Unit(s) SubCutaneous every 8 hours  linezolid    Tablet 600 milliGRAM(s) Oral every 12 hours  losartan 50 milliGRAM(s) Oral daily  tobramycin IVPB 150 milliGRAM(s) IV Intermittent once      Vital Signs Last 24 Hrs  T(C): 35.7 (16 Mar 2018 08:30), Max: 36.7 (15 Mar 2018 14:58)  T(F): 96.3 (16 Mar 2018 08:30), Max: 98.1 (15 Mar 2018 14:58)  HR: 65 (16 Mar 2018 08:30) (65 - 99)  BP: 139/83 (16 Mar 2018 08:30) (117/83 - 148/81)  BP(mean): --  RR: 16 (16 Mar 2018 08:30) (16 - 18)  SpO2: 93% (16 Mar 2018 08:30) (92% - 95%)  I&O's Detail    15 Mar 2018 07:01  -  16 Mar 2018 07:00  --------------------------------------------------------  IN:    Oral Fluid: 360 mL    Solution: 50 mL  Total IN: 410 mL    OUT:    Bulb: 15 mL  Total OUT: 15 mL    Total NET: 395 mL          General: NAD, resting comfortably in bed  C/V: NSR  Pulm: Nonlabored breathing, no respiratory distress  Abd: soft, NT/ND.  Extrem: WWP, no edema, SCDs in place        LABS:                        8.1    5.9   )-----------( 363      ( 16 Mar 2018 06:56 )             27.5     03-16    138  |  103  |  11  ----------------------------<  76  4.2   |  22  |  0.85    Ca    8.4      16 Mar 2018 06:56  Phos  3.3     03-16  Mg     2.3     03-16            RADIOLOGY & ADDITIONAL STUDIES:

## 2018-03-16 NOTE — PROGRESS NOTE ADULT - SUBJECTIVE AND OBJECTIVE BOX
tolerating diet but needs more protein as discussed  drainage minimal  has resident ecoli so on IV therapy for two weeks  await placement  can remove drain when drainge slightly lower  if no placement will allow to go to daughters shower

## 2018-03-20 NOTE — ASU PATIENT PROFILE, ADULT - --DESCRIBE SURGICAL SITE
Trenton Haro), Orthopaedic Surgery  [address]  [address] [place] [address]  Phone: [phone]  Fax: [phone] above umbilicus; 1 inch above is pinhole opening ( excretes grey drainage sometimes)

## 2018-03-21 DIAGNOSIS — K63.2 FISTULA OF INTESTINE: ICD-10-CM

## 2018-03-21 DIAGNOSIS — K95.89 OTHER COMPLICATIONS OF OTHER BARIATRIC PROCEDURE: ICD-10-CM

## 2018-03-21 DIAGNOSIS — T81.4XXA INFECTION FOLLOWING A PROCEDURE, INITIAL ENCOUNTER: ICD-10-CM

## 2018-03-21 DIAGNOSIS — I10 ESSENTIAL (PRIMARY) HYPERTENSION: ICD-10-CM
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
## 2018-03-21 DIAGNOSIS — E66.9 OBESITY, UNSPECIFIED: ICD-10-CM

## 2018-03-21 DIAGNOSIS — K65.1 PERITONEAL ABSCESS: ICD-10-CM

## 2018-03-21 DIAGNOSIS — K91.89 OTHER POSTPROCEDURAL COMPLICATIONS AND DISORDERS OF DIGESTIVE SYSTEM: ICD-10-CM
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
## 2018-03-21 DIAGNOSIS — N13.30 UNSPECIFIED HYDRONEPHROSIS: ICD-10-CM

## 2018-03-21 DIAGNOSIS — B96.89 OTHER SPECIFIED BACTERIAL AGENTS AS THE CAUSE OF DISEASES CLASSIFIED ELSEWHERE: ICD-10-CM

## 2018-03-21 DIAGNOSIS — K21.9 GASTRO-ESOPHAGEAL REFLUX DISEASE WITHOUT ESOPHAGITIS: ICD-10-CM

## 2018-03-28 ENCOUNTER — OUTPATIENT (OUTPATIENT)
Dept: OUTPATIENT SERVICES | Facility: HOSPITAL | Age: 58
LOS: 1 days | End: 2018-03-28
Payer: COMMERCIAL

## 2018-03-28 ENCOUNTER — EMERGENCY (EMERGENCY)
Facility: HOSPITAL | Age: 58
LOS: 1 days | Discharge: HOME | End: 2018-03-28
Attending: EMERGENCY MEDICINE | Admitting: EMERGENCY MEDICINE
Payer: COMMERCIAL

## 2018-03-28 ENCOUNTER — APPOINTMENT (OUTPATIENT)
Dept: BARIATRICS | Facility: CLINIC | Age: 58
End: 2018-03-28
Payer: COMMERCIAL

## 2018-03-28 ENCOUNTER — APPOINTMENT (OUTPATIENT)
Dept: CT IMAGING | Facility: HOSPITAL | Age: 58
End: 2018-03-28
Payer: COMMERCIAL

## 2018-03-28 VITALS
TEMPERATURE: 97.3 F | SYSTOLIC BLOOD PRESSURE: 99 MMHG | OXYGEN SATURATION: 95 % | DIASTOLIC BLOOD PRESSURE: 67 MMHG | HEART RATE: 105 BPM

## 2018-03-28 VITALS
RESPIRATION RATE: 16 BRPM | OXYGEN SATURATION: 100 % | SYSTOLIC BLOOD PRESSURE: 96 MMHG | HEART RATE: 80 BPM | DIASTOLIC BLOOD PRESSURE: 66 MMHG

## 2018-03-28 VITALS
TEMPERATURE: 98 F | DIASTOLIC BLOOD PRESSURE: 60 MMHG | SYSTOLIC BLOOD PRESSURE: 90 MMHG | WEIGHT: 160.06 LBS | HEIGHT: 63 IN | HEART RATE: 94 BPM | RESPIRATION RATE: 18 BRPM | OXYGEN SATURATION: 98 %

## 2018-03-28 DIAGNOSIS — T85.698A OTHER MECHANICAL COMPLICATION OF OTHER SPECIFIED INTERNAL PROSTHETIC DEVICES, IMPLANTS AND GRAFTS, INITIAL ENCOUNTER: ICD-10-CM
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
## 2018-03-28 DIAGNOSIS — I10 ESSENTIAL (PRIMARY) HYPERTENSION: ICD-10-CM

## 2018-03-28 DIAGNOSIS — Z88.2 ALLERGY STATUS TO SULFONAMIDES: ICD-10-CM

## 2018-03-28 DIAGNOSIS — Z79.899 OTHER LONG TERM (CURRENT) DRUG THERAPY: ICD-10-CM

## 2018-03-28 DIAGNOSIS — Z98.84 BARIATRIC SURGERY STATUS: Chronic | ICD-10-CM

## 2018-03-28 DIAGNOSIS — Z41.9 ENCOUNTER FOR PROCEDURE FOR PURPOSES OTHER THAN REMEDYING HEALTH STATE, UNSPECIFIED: Chronic | ICD-10-CM
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
## 2018-03-28 PROCEDURE — 49423 EXCHANGE DRAINAGE CATHETER: CPT

## 2018-03-28 PROCEDURE — C1729: CPT

## 2018-03-28 PROCEDURE — 74176 CT ABD & PELVIS W/O CONTRAST: CPT | Mod: 26

## 2018-03-28 PROCEDURE — 75984 XRAY CONTROL CATHETER CHANGE: CPT

## 2018-03-28 PROCEDURE — 99024 POSTOP FOLLOW-UP VISIT: CPT

## 2018-03-28 PROCEDURE — 99284 EMERGENCY DEPT VISIT MOD MDM: CPT

## 2018-03-28 PROCEDURE — 99152 MOD SED SAME PHYS/QHP 5/>YRS: CPT

## 2018-03-28 PROCEDURE — 99283 EMERGENCY DEPT VISIT LOW MDM: CPT

## 2018-03-28 PROCEDURE — 75984 XRAY CONTROL CATHETER CHANGE: CPT | Mod: 26

## 2018-03-28 PROCEDURE — 74176 CT ABD & PELVIS W/O CONTRAST: CPT

## 2018-03-28 RX ORDER — SOLIFENACIN SUCCINATE 10 MG/1
1 TABLET ORAL
Qty: 0 | Refills: 0 | COMMUNITY

## 2018-03-28 RX ORDER — SODIUM CHLORIDE 9 MG/ML
1000 INJECTION INTRAMUSCULAR; INTRAVENOUS; SUBCUTANEOUS ONCE
Qty: 0 | Refills: 0 | Status: COMPLETED | OUTPATIENT
Start: 2018-03-28 | End: 2018-03-28

## 2018-03-28 RX ADMIN — SODIUM CHLORIDE 1000 MILLILITER(S): 9 INJECTION INTRAMUSCULAR; INTRAVENOUS; SUBCUTANEOUS at 20:56

## 2018-03-28 NOTE — ED ADULT NURSE REASSESSMENT NOTE - NS ED NURSE REASSESS COMMENT FT1
As per  Jayy Morales the nursing supervisor notified of pt returning to [facility] nursing facility and pt pending transport. Pt stable and will continue to monitor.

## 2018-03-28 NOTE — ED ADULT NURSE REASSESSMENT NOTE - NS ED NURSE REASSESS COMMENT FT1
Received report from IR RN, Marija. Pt had abdominal abscess draining, tolerated procedure well. CHECO drain placed w/ 100 cc of serosanguineous fluid drained. Pt receieved 1 mg Versed and 25 mcg of Fentanyl through PICC line on left upper arm. BP: 100/58, HR 84, RR 16, O2 100%. Pt en route back to ED.

## 2018-03-28 NOTE — ED ADULT NURSE NOTE - OBJECTIVE STATEMENT
Patient BIBA, AAOx3, in NAD, vitals stable, complaining of mechanical fall with accidently dislodgement of surgical site drain.  Patient states she has bariatric surgery in January 2017 and has the drain since.  Per patient site had been producing minimal drainage.  Patient denies any dizziness prior to fall, LOC, blood thinners, injury, pain or any other complaints.

## 2018-03-28 NOTE — ED ADULT TRIAGE NOTE - CHIEF COMPLAINT QUOTE
Pt BIBA from [facility] s/p fall and CO Post Op Complication.  Per EMS, Pt underwent bariatric Sx, then later had a drain placed because of a post op abscess, and today felt herself falling and was lowered to the ground, but her drain was dislodged.  Pt denies any pain, N/V/D, SOB, Fevers and Dizziness

## 2018-03-28 NOTE — ED ADULT NURSE REASSESSMENT NOTE - NS ED NURSE REASSESS COMMENT FT1
Pt is A&Ox3 at this time but sleepy. Pt VSS and pt denies any pain or SOB at this time. Pt pending transport to Nursing home.  Pt CHECO site CDI at this time. Will continue to monitor.

## 2018-03-28 NOTE — CONSULT NOTE ADULT - SUBJECTIVE AND OBJECTIVE BOX
HPI:  57 yo female w/PMHx of HTN, gastric bypass in 2002 complicated by stricture and chronic leak, s/p revision with complicated hospital course and extensive history, admitted recently with left lower quadrant abdominal abscess, s/p IR drainage, discharged on 3/16, now back to the ED because her IR drain fell out today.   Plan to replace the drain by IR    PAST MEDICAL & SURGICAL HISTORY:  Fistula of intestine  Washed out  Reflux  Obesity  DVT (deep venous thrombosis): 2013 neck  Diverticulitis  Hypertension  Elective surgery: abodominal wall surgery  dec 2016  Elective surgery: NOV 2016 gastric bypass revision  Gastric bypass status for obesity: gastric sleeve, 6/2012  S/P breast biopsy: 2011, left  S/P colon resection: 2011  S/P knee surgery: repair 2009, 2011  right; left  Umbilical hernia: 2000  S/P appendectomy: 1975  S/P tonsillectomy: 1967      ROS: See HPI    MEDICATIONS:  ALLERGIES:    SOCIAL HISTORY:  Smoke: Never Smoker  EtOH: occasional    Vital Signs Last 24 Hrs  T(C): 36.3 (28 Mar 2018 20:17), Max: 36.6 (28 Mar 2018 16:01)  T(F): 97.4 (28 Mar 2018 20:17), Max: 97.8 (28 Mar 2018 16:01)  HR: 83 (28 Mar 2018 20:17) (83 - 94)  BP: 97/68 (28 Mar 2018 20:17) (90/60 - 118/82)  BP(mean): --  RR: 16 (28 Mar 2018 20:17) (16 - 18)  SpO2: 100% (28 Mar 2018 20:17) (98% - 100%)    PHYSICAL EXAM    Gen: NAD  CV: RRR  Pulm: CTAB  Abd: Soft, obese, non-distended, mildly tender to palpation in LLQ      ASSESSMENT AND PLAN:  58 yo female, s/p gastric bypass in 2002 with complicated course and chronic leak s/p revision, now with recent LLQ abscess s/p IR drainage, now with IR drain that fell out.   S/p new drain placement by IR today without complications.      - follow-up as outpatient with Dr. Brady   - may discharge back to rehab   - please flush new drain twice a day   - discussed with chief resident and Dr. Brady

## 2018-03-28 NOTE — ED PROVIDER NOTE - MEDICAL DECISION MAKING DETAILS
pt with casey dislodgment replaced in IR without complications - as per surgery may return to [facility] - transport arranged from ED. pt with casey dislodgment replaced in IR without complications - as per surgery may return to [facility] - transport arranged from ED.  pt with BP 90s will give 1 L NS prior to tx - pt somnolent but answering most questions without focal deficits s/p sedation

## 2018-03-28 NOTE — ED PROVIDER NOTE - OBJECTIVE STATEMENT
58 y/o f with PMH of gastric bypass in 2002 with multiple complications including fistulas and abscesses also s/p small bowel reconstruction recently discharged one week prior after abscess to left lower quadrant developed requiring washout and CHECO drain.  Pt had follow up with Dr. Brady this afternoon and when leaving office pt had mechanical fall which caused dislodgment of CHECO drain.  Pt sent to ED for IR placement of CHECO drain.  Pt denies other mechanical fall complaints.  No head trauma or bony complaints.  No hip pain or leg pain.

## 2018-03-28 NOTE — ED ADULT NURSE REASSESSMENT NOTE - NS ED NURSE REASSESS COMMENT FT1
Pt back from IR. Pt BP 94/63, provider aware. NS ordered. Pt awake but drowsy. Pt answers all questions, AAOx3. Will continue to monitor.

## 2018-03-30 ENCOUNTER — OUTPATIENT (OUTPATIENT)
Dept: OUTPATIENT SERVICES | Facility: HOSPITAL | Age: 58
LOS: 1 days | End: 2018-03-30
Payer: COMMERCIAL

## 2018-03-30 DIAGNOSIS — Z41.9 ENCOUNTER FOR PROCEDURE FOR PURPOSES OTHER THAN REMEDYING HEALTH STATE, UNSPECIFIED: Chronic | ICD-10-CM

## 2018-03-30 DIAGNOSIS — Z79.01 LONG TERM (CURRENT) USE OF ANTICOAGULANTS: ICD-10-CM

## 2018-03-30 DIAGNOSIS — Z98.84 BARIATRIC SURGERY STATUS: Chronic | ICD-10-CM

## 2018-03-30 LAB
HCT VFR BLD CALC: 23.6 % — LOW (ref 34.5–45)
HGB BLD-MCNC: 7.2 G/DL — LOW (ref 11.5–15.5)
MCHC RBC-ENTMCNC: 27.1 PG — SIGNIFICANT CHANGE UP (ref 27–34)
MCHC RBC-ENTMCNC: 30.4 GM/DL — LOW (ref 32–36)
MCV RBC AUTO: 89 FL — SIGNIFICANT CHANGE UP (ref 80–100)
PLATELET # BLD AUTO: 90 K/UL — LOW (ref 150–400)
RBC # BLD: 2.65 M/UL — LOW (ref 3.8–5.2)
RBC # FLD: 19 % — HIGH (ref 10.3–14.5)
WBC # BLD: 4.4 K/UL — SIGNIFICANT CHANGE UP (ref 3.8–10.5)
WBC # FLD AUTO: 4.4 K/UL — SIGNIFICANT CHANGE UP (ref 3.8–10.5)

## 2018-03-30 PROCEDURE — 86901 BLOOD TYPING SEROLOGIC RH(D): CPT

## 2018-03-30 PROCEDURE — 86900 BLOOD TYPING SEROLOGIC ABO: CPT

## 2018-03-30 PROCEDURE — 86850 RBC ANTIBODY SCREEN: CPT

## 2018-03-30 PROCEDURE — 86923 COMPATIBILITY TEST ELECTRIC: CPT

## 2018-03-30 PROCEDURE — 85027 COMPLETE CBC AUTOMATED: CPT

## 2018-03-30 PROCEDURE — 36430 TRANSFUSION BLD/BLD COMPNT: CPT

## 2018-03-30 PROCEDURE — P9016: CPT

## 2018-03-30 RX ORDER — ACETAMINOPHEN 500 MG
650 TABLET ORAL ONCE
Qty: 0 | Refills: 0 | Status: COMPLETED | OUTPATIENT
Start: 2018-03-30 | End: 2018-03-30

## 2018-03-30 RX ORDER — DIPHENHYDRAMINE HCL 50 MG
25 CAPSULE ORAL ONCE
Qty: 0 | Refills: 0 | Status: COMPLETED | OUTPATIENT
Start: 2018-03-30 | End: 2018-03-30

## 2018-03-30 RX ADMIN — Medication 650 MILLIGRAM(S): at 12:11

## 2018-03-30 RX ADMIN — Medication 25 MILLIGRAM(S): at 12:11

## 2018-04-04 NOTE — PROGRESS NOTE ADULT - ASSESSMENT
Cardiology Consultation     Vazquez Cormier  [id number]  1958     [address] [facility] [address]  [facility] [address] [place] [address] [address] [id number]    1  Abnormal EKG     2  RBBB     3  Essential hypertension     4  High cholesterol     5  Chest pressure     6  Enlarged RV (right ventricle)         HPI: Patient is here for evaluation and management of abnormal EKG seen at his PMD's office  Associated with chest pressure  No significant change to chest pressure compared to prior visit  Still walking without significant complaints  Patient Active Problem List   Diagnosis    Essential hypertension    Overweight (BMI 25 0-29  9)    Tobacco use    Chest pressure    High cholesterol    Abnormal EKG    RBBB    Enlarged RV (right ventricle)     History reviewed  No pertinent past medical history  Social History     Social History    Marital status: Single     Spouse name: N/A    Number of children: N/A    Years of education: N/A     Occupational History    Not on file       Social History Main Topics    Smoking status: Current Every Day Smoker     Packs/day: 0 50     Years: 50 00     Types: Cigarettes    Smokeless tobacco: Never Used    Alcohol use 1 8 oz/week     3 Shots of liquor per week    Drug use: No    Sexual activity: Not on file     Other Topics Concern    Not on file     Social History Narrative    Drinks coffee, 1 cup per day       Family History   Problem Relation Age of Onset    Hypertension Mother     Diabetes Mother     Alcohol abuse Father     Drug abuse Father     Asthma Father     Cancer Father      Lung cancer/smoker    Heart disease Brother 48     on LVAD    Heart attack Brother     Heart failure Brother      pending heart transplant 04/04/18    Hyperlipidemia Brother     Hyperlipidemia Sister      Past Surgical History:   Procedure Laterality Date    ORIF ANKLE FRACTURE Right        Current Outpatient Prescriptions:     acetaminophen (TYLENOL) 500 mg tablet, Take 500 mg by mouth every 6 (six) hours as needed for mild pain, Disp: , Rfl:     lisinopril-hydrochlorothiazide (PRINZIDE,ZESTORETIC) 20-25 MG per tablet, Take 1 tablet by mouth daily, Disp: , Rfl:     ranitidine (ZANTAC) 150 MG capsule, Take 150 mg by mouth daily as needed  , Disp: , Rfl:   No Known Allergies  Vitals:    04/04/18 0904   BP: 132/80   BP Location: Left arm   Patient Position: Sitting   Cuff Size: Adult   Pulse: 100   SpO2: 97%   Weight: 73 kg (161 lb)   Height: 5' 4 5" (1 638 m)       Labs:  Appointment on 02/23/2018   Component Date Value    Sodium 02/23/2018 137     Potassium 02/23/2018 4 1     Chloride 02/23/2018 101     CO2 02/23/2018 29     Anion Gap 02/23/2018 7     BUN 02/23/2018 19     Creatinine 02/23/2018 0 93     Glucose, Fasting 02/23/2018 118*    Calcium 02/23/2018 9 3     AST 02/23/2018 33     ALT 02/23/2018 48     Alkaline Phosphatase 02/23/2018 102     Total Protein 02/23/2018 7 8     Albumin 02/23/2018 3 8     Total Bilirubin 02/23/2018 0 50     eGFR 02/23/2018 104     Cholesterol 02/23/2018 187     Triglycerides 02/23/2018 102     HDL, Direct 02/23/2018 55     LDL Calculated 02/23/2018 112*   Office Visit on 02/14/2018   Component Date Value    ECG Interp during Ex 02/14/2018 2/14/2018     TSH 3RD GENERATON 02/23/2018 0 990      Lab Results   Component Value Date    CHOL 187 02/23/2018    TRIG 102 02/23/2018    HDL 55 02/23/2018     Imaging: No results found  Echo: personally reviewed - EF 60%, no wall motion abnormalities  Mild MR, TR  Mild RV dilation  Review of Systems:  Review of Systems   Constitutional: Negative for activity change, appetite change, fatigue and fever  HENT: Negative for nosebleeds and sore throat  Eyes: Negative for photophobia and visual disturbance  Respiratory: Positive for chest tightness and shortness of breath  Negative for cough and wheezing  57 F s/p  SBR, fistula resection, esophagojejunostomy revision w/ post-op course complicated by RTOR for distal anastomosis breakdown, ATN, acute respiratory failure, & septic shock.     NPO/TPN/IVF  Pain/nausea control  ISS  L IJ  Nystatin powder  SCDs, lovenox, OOBA  Drains: Malecot in enterotomy site, wound vac to midline  Wounds: Midline xiphoid to pubis incision; DTI & stage 2 pressure ulcer noted. Cardiovascular: Negative for chest pain, palpitations and leg swelling  Gastrointestinal: Negative for abdominal pain, diarrhea, nausea and vomiting  Endocrine: Negative for polyuria  Genitourinary: Negative for dysuria, frequency and hematuria  Musculoskeletal: Negative for arthralgias, back pain and gait problem  Skin: Negative for pallor and rash  Neurological: Negative for dizziness, syncope, speech difficulty and light-headedness  Hematological: Does not bruise/bleed easily  Psychiatric/Behavioral: Negative for agitation, behavioral problems and confusion  Physical Exam:  Physical Exam   Constitutional: He is oriented to person, place, and time  He appears well-developed and well-nourished  No distress  HENT:   Head: Normocephalic and atraumatic  Nose: Nose normal    Eyes: EOM are normal  Pupils are equal, round, and reactive to light  No scleral icterus  Neck: Normal range of motion  Neck supple  No JVD present  Cardiovascular: Normal rate, regular rhythm, S1 normal and S2 normal   Exam reveals no gallop, no S3, no distant heart sounds and no friction rub  No murmur heard  No systolic murmur is present   Pulmonary/Chest: Effort normal and breath sounds normal  No respiratory distress  He has no wheezes  He has no rales  Abdominal: Soft  Bowel sounds are normal  He exhibits no distension  Musculoskeletal: He exhibits no edema or deformity  Neurological: He is alert and oriented to person, place, and time  No cranial nerve deficit  Skin: Skin is warm and dry  He is not diaphoretic  No erythema  Psychiatric: He has a normal mood and affect  His behavior is normal    Vitals reviewed  Blood pressure 132/80, pulse 100, height 5' 4 5" (1 638 m), weight 73 kg (161 lb), SpO2 97 %  Discussion/Summary:  1) Abnormal EKG: SR with RBBB that is new  Also with symptoms of chest pressure    We evaluated further with an echocardiogram that revealed a mildly dilated RV - this could explain RBBB with mild stretch of ventricular fibers  This is likely a result of chronic smoking and a precursor to pulm HTN  Explained these results to the patient and stressed the importance of quitting smoking  A stress test to rule out ischemia has been ordered by his PCP - this is yet to be completed  2) chest pressure: as above  3) HTN: controlled, continue current regimen      4) HLD: to be checked and ordered by PMD  57 F s/p  SBR, fistula resection, esophagojejunostomy revision w/ post-op course complicated by RTOR for distal anastomosis breakdown, ATN, acute respiratory failure, & septic shock.     NPO/TPN/IVF  Pain/nausea control  ISS  L IJ  Vac changed  Nystatin powder  SCDs, lovenox, OOBA  Drains: Malecot in enterotomy site, wound vac to midline  Wounds: Midline xiphoid to pubis incision; DTI & stage 2 pressure ulcer noted.

## 2018-04-19 ENCOUNTER — FORM ENCOUNTER (OUTPATIENT)
Age: 58
End: 2018-04-19

## 2018-04-20 ENCOUNTER — OUTPATIENT (OUTPATIENT)
Dept: OUTPATIENT SERVICES | Facility: HOSPITAL | Age: 58
LOS: 1 days | End: 2018-04-20
Payer: COMMERCIAL

## 2018-04-20 ENCOUNTER — APPOINTMENT (OUTPATIENT)
Dept: CT IMAGING | Facility: HOSPITAL | Age: 58
End: 2018-04-20
Payer: COMMERCIAL

## 2018-04-20 ENCOUNTER — APPOINTMENT (OUTPATIENT)
Dept: BARIATRICS | Facility: CLINIC | Age: 58
End: 2018-04-20

## 2018-04-20 ENCOUNTER — APPOINTMENT (OUTPATIENT)
Dept: INTERVENTIONAL RADIOLOGY/VASCULAR | Facility: HOSPITAL | Age: 58
End: 2018-04-20

## 2018-04-20 DIAGNOSIS — Z98.84 BARIATRIC SURGERY STATUS: Chronic | ICD-10-CM

## 2018-04-20 DIAGNOSIS — L02.211 CUTANEOUS ABSCESS OF ABDOMINAL WALL: ICD-10-CM

## 2018-04-20 DIAGNOSIS — Z41.9 ENCOUNTER FOR PROCEDURE FOR PURPOSES OTHER THAN REMEDYING HEALTH STATE, UNSPECIFIED: Chronic | ICD-10-CM

## 2018-04-20 PROCEDURE — C1729: CPT

## 2018-04-20 PROCEDURE — 87070 CULTURE OTHR SPECIMN AEROBIC: CPT

## 2018-04-20 PROCEDURE — 74176 CT ABD & PELVIS W/O CONTRAST: CPT

## 2018-04-20 PROCEDURE — 10030 IMG GID FLU COLL DRG SFT TIS: CPT

## 2018-04-20 PROCEDURE — 87075 CULTR BACTERIA EXCEPT BLOOD: CPT

## 2018-04-20 PROCEDURE — C1769: CPT

## 2018-04-20 PROCEDURE — 87186 SC STD MICRODIL/AGAR DIL: CPT

## 2018-04-20 PROCEDURE — 74176 CT ABD & PELVIS W/O CONTRAST: CPT | Mod: 26

## 2018-04-20 PROCEDURE — 87205 SMEAR GRAM STAIN: CPT

## 2018-04-21 LAB
GRAM STN FLD: SIGNIFICANT CHANGE UP
SPECIMEN SOURCE: SIGNIFICANT CHANGE UP

## 2018-04-22 LAB
-  AMPICILLIN/SULBACTAM: SIGNIFICANT CHANGE UP
-  AMPICILLIN: SIGNIFICANT CHANGE UP
-  CEFAZOLIN: SIGNIFICANT CHANGE UP
-  CEFTRIAXONE: SIGNIFICANT CHANGE UP
-  CIPROFLOXACIN: SIGNIFICANT CHANGE UP
-  GENTAMICIN: SIGNIFICANT CHANGE UP
-  IMIPENEM: SIGNIFICANT CHANGE UP
-  MEROPENEM: SIGNIFICANT CHANGE UP
-  PIPERACILLIN/TAZOBACTAM: SIGNIFICANT CHANGE UP
-  TOBRAMYCIN: SIGNIFICANT CHANGE UP
-  TRIMETHOPRIM/SULFAMETHOXAZOLE: SIGNIFICANT CHANGE UP
CULTURE RESULTS: SIGNIFICANT CHANGE UP
METHOD TYPE: SIGNIFICANT CHANGE UP
ORGANISM # SPEC MICROSCOPIC CNT: SIGNIFICANT CHANGE UP
ORGANISM # SPEC MICROSCOPIC CNT: SIGNIFICANT CHANGE UP
SPECIMEN SOURCE: SIGNIFICANT CHANGE UP

## 2018-05-16 ENCOUNTER — APPOINTMENT (OUTPATIENT)
Dept: BARIATRICS | Facility: CLINIC | Age: 58
End: 2018-05-16
Payer: COMMERCIAL

## 2018-05-16 VITALS
BODY MASS INDEX: 25.61 KG/M2 | SYSTOLIC BLOOD PRESSURE: 108 MMHG | WEIGHT: 150 LBS | HEIGHT: 64 IN | OXYGEN SATURATION: 94 % | DIASTOLIC BLOOD PRESSURE: 75 MMHG | HEART RATE: 76 BPM | TEMPERATURE: 99.5 F

## 2018-05-16 PROCEDURE — 99214 OFFICE O/P EST MOD 30 MIN: CPT

## 2018-05-29 NOTE — ED PROVIDER NOTE - DATE/TIME 1
----- Message from Leila Reis sent at 5/29/2018 10:33 AM CDT -----  Contact: self   [phone]  Pt is calling to speak with the nurse pt is still having problems going to bathroom. Please contact pt she says it is painful. Pt just had surgery.  
Told pt Flomax was sent to her pharmacy. Did inform her it does take several days to work. She is urinating but is having to strain. Told her if the symtons continue she  Will need to see urology.    
10-Mar-2018 19:12

## 2018-08-29 ENCOUNTER — APPOINTMENT (OUTPATIENT)
Dept: BARIATRICS | Facility: CLINIC | Age: 58
End: 2018-08-29
Payer: COMMERCIAL

## 2018-08-29 VITALS
OXYGEN SATURATION: 96 % | WEIGHT: 148.06 LBS | TEMPERATURE: 98.1 F | DIASTOLIC BLOOD PRESSURE: 89 MMHG | SYSTOLIC BLOOD PRESSURE: 137 MMHG | HEART RATE: 101 BPM | HEIGHT: 64 IN | BODY MASS INDEX: 25.28 KG/M2

## 2018-08-29 DIAGNOSIS — T81.9XXA UNSPECIFIED COMPLICATION OF PROCEDURE, INITIAL ENCOUNTER: ICD-10-CM

## 2018-08-29 DIAGNOSIS — E46 UNSPECIFIED PROTEIN-CALORIE MALNUTRITION: ICD-10-CM

## 2018-08-29 DIAGNOSIS — G47.00 INSOMNIA, UNSPECIFIED: ICD-10-CM

## 2018-08-29 DIAGNOSIS — Z98.84 BARIATRIC SURGERY STATUS: ICD-10-CM

## 2018-08-29 PROCEDURE — 99215 OFFICE O/P EST HI 40 MIN: CPT

## 2018-09-12 NOTE — PROGRESS NOTE BEHAVIORAL HEALTH - NS ED [facility] MED ROS CARDIOVASCULAR
No complaints
not examined
No complaints

## 2019-01-10 ENCOUNTER — INPATIENT (INPATIENT)
Facility: HOSPITAL | Age: 59
LOS: 8 days | Discharge: HOME | DRG: 57 | End: 2019-01-19
Attending: INTERNAL MEDICINE | Admitting: INTERNAL MEDICINE
Payer: COMMERCIAL

## 2019-01-10 VITALS
HEART RATE: 91 BPM | SYSTOLIC BLOOD PRESSURE: 148 MMHG | TEMPERATURE: 98 F | RESPIRATION RATE: 17 BRPM | DIASTOLIC BLOOD PRESSURE: 113 MMHG | OXYGEN SATURATION: 100 % | WEIGHT: 138.89 LBS | HEIGHT: 63 IN

## 2019-01-10 DIAGNOSIS — Z41.9 ENCOUNTER FOR PROCEDURE FOR PURPOSES OTHER THAN REMEDYING HEALTH STATE, UNSPECIFIED: Chronic | ICD-10-CM

## 2019-01-10 DIAGNOSIS — N39.0 URINARY TRACT INFECTION, SITE NOT SPECIFIED: ICD-10-CM

## 2019-01-10 DIAGNOSIS — R41.82 ALTERED MENTAL STATUS, UNSPECIFIED: ICD-10-CM

## 2019-01-10 DIAGNOSIS — Z29.9 ENCOUNTER FOR PROPHYLACTIC MEASURES, UNSPECIFIED: ICD-10-CM

## 2019-01-10 DIAGNOSIS — I10 ESSENTIAL (PRIMARY) HYPERTENSION: ICD-10-CM

## 2019-01-10 DIAGNOSIS — F07.0 PERSONALITY CHANGE DUE TO KNOWN PHYSIOLOGICAL CONDITION: ICD-10-CM

## 2019-01-10 DIAGNOSIS — Z98.84 BARIATRIC SURGERY STATUS: Chronic | ICD-10-CM

## 2019-01-10 LAB
ALBUMIN SERPL ELPH-MCNC: 3.2 G/DL — LOW (ref 3.3–5)
ALP SERPL-CCNC: 118 U/L — SIGNIFICANT CHANGE UP (ref 30–120)
ALT FLD-CCNC: 21 U/L DA — SIGNIFICANT CHANGE UP (ref 10–60)
ANION GAP SERPL CALC-SCNC: 7 MMOL/L — SIGNIFICANT CHANGE UP (ref 5–17)
APAP SERPL-MCNC: <1 — SIGNIFICANT CHANGE UP (ref 10–30)
APPEARANCE UR: CLEAR — SIGNIFICANT CHANGE UP
AST SERPL-CCNC: 17 U/L — SIGNIFICANT CHANGE UP (ref 10–40)
BASOPHILS # BLD AUTO: 0.02 K/UL — SIGNIFICANT CHANGE UP (ref 0–0.2)
BASOPHILS NFR BLD AUTO: 0.3 % — SIGNIFICANT CHANGE UP (ref 0–2)
BILIRUB SERPL-MCNC: 0.6 MG/DL — SIGNIFICANT CHANGE UP (ref 0.2–1.2)
BILIRUB UR-MCNC: NEGATIVE — SIGNIFICANT CHANGE UP
BUN SERPL-MCNC: 27 MG/DL — HIGH (ref 7–23)
CALCIUM SERPL-MCNC: 9 MG/DL — SIGNIFICANT CHANGE UP (ref 8.4–10.5)
CHLORIDE SERPL-SCNC: 107 MMOL/L — SIGNIFICANT CHANGE UP (ref 96–108)
CO2 SERPL-SCNC: 27 MMOL/L — SIGNIFICANT CHANGE UP (ref 22–31)
COLOR SPEC: YELLOW — SIGNIFICANT CHANGE UP
CREAT SERPL-MCNC: 0.88 MG/DL — SIGNIFICANT CHANGE UP (ref 0.5–1.3)
DIFF PNL FLD: NEGATIVE — SIGNIFICANT CHANGE UP
EOSINOPHIL # BLD AUTO: 0.14 K/UL — SIGNIFICANT CHANGE UP (ref 0–0.5)
EOSINOPHIL NFR BLD AUTO: 2.4 % — SIGNIFICANT CHANGE UP (ref 0–6)
ETHANOL SERPL-MCNC: <3 MG/DL — SIGNIFICANT CHANGE UP (ref 0–3)
GLUCOSE SERPL-MCNC: 95 MG/DL — SIGNIFICANT CHANGE UP (ref 70–99)
GLUCOSE UR QL: NEGATIVE MG/DL — SIGNIFICANT CHANGE UP
HCT VFR BLD CALC: 37.7 % — SIGNIFICANT CHANGE UP (ref 34.5–45)
HGB BLD-MCNC: 11.7 G/DL — SIGNIFICANT CHANGE UP (ref 11.5–15.5)
IMM GRANULOCYTES NFR BLD AUTO: 0.2 % — SIGNIFICANT CHANGE UP (ref 0–1.5)
KETONES UR-MCNC: NEGATIVE — SIGNIFICANT CHANGE UP
LEUKOCYTE ESTERASE UR-ACNC: ABNORMAL
LYMPHOCYTES # BLD AUTO: 1.6 K/UL — SIGNIFICANT CHANGE UP (ref 1–3.3)
LYMPHOCYTES # BLD AUTO: 27.8 % — SIGNIFICANT CHANGE UP (ref 13–44)
MCHC RBC-ENTMCNC: 29.2 PG — SIGNIFICANT CHANGE UP (ref 27–34)
MCHC RBC-ENTMCNC: 31 GM/DL — LOW (ref 32–36)
MCV RBC AUTO: 94 FL — SIGNIFICANT CHANGE UP (ref 80–100)
MONOCYTES # BLD AUTO: 0.49 K/UL — SIGNIFICANT CHANGE UP (ref 0–0.9)
MONOCYTES NFR BLD AUTO: 8.5 % — SIGNIFICANT CHANGE UP (ref 2–14)
NEUTROPHILS # BLD AUTO: 3.5 K/UL — SIGNIFICANT CHANGE UP (ref 1.8–7.4)
NEUTROPHILS NFR BLD AUTO: 60.8 % — SIGNIFICANT CHANGE UP (ref 43–77)
NITRITE UR-MCNC: POSITIVE
PCP SPEC-MCNC: SIGNIFICANT CHANGE UP
PH UR: 6 — SIGNIFICANT CHANGE UP (ref 5–8)
PLATELET # BLD AUTO: 208 K/UL — SIGNIFICANT CHANGE UP (ref 150–400)
POTASSIUM SERPL-MCNC: 3.9 MMOL/L — SIGNIFICANT CHANGE UP (ref 3.5–5.3)
POTASSIUM SERPL-SCNC: 3.9 MMOL/L — SIGNIFICANT CHANGE UP (ref 3.5–5.3)
PROT SERPL-MCNC: 6.8 G/DL — SIGNIFICANT CHANGE UP (ref 6–8.3)
PROT UR-MCNC: 15 MG/DL
RBC # BLD: 4.01 M/UL — SIGNIFICANT CHANGE UP (ref 3.8–5.2)
RBC # FLD: 14.2 % — SIGNIFICANT CHANGE UP (ref 10.3–14.5)
SALICYLATES SERPL-MCNC: 1.6 MG/DL — LOW (ref 2.8–20)
SODIUM SERPL-SCNC: 141 MMOL/L — SIGNIFICANT CHANGE UP (ref 135–145)
SP GR SPEC: 1.02 — SIGNIFICANT CHANGE UP (ref 1.01–1.02)
UROBILINOGEN FLD QL: 4 MG/DL
WBC # BLD: 5.76 K/UL — SIGNIFICANT CHANGE UP (ref 3.8–10.5)
WBC # FLD AUTO: 5.76 K/UL — SIGNIFICANT CHANGE UP (ref 3.8–10.5)

## 2019-01-10 PROCEDURE — 70450 CT HEAD/BRAIN W/O DYE: CPT | Mod: 26

## 2019-01-10 PROCEDURE — 99285 EMERGENCY DEPT VISIT HI MDM: CPT

## 2019-01-10 RX ORDER — ESCITALOPRAM OXALATE 10 MG/1
20 TABLET, FILM COATED ORAL DAILY
Qty: 0 | Refills: 0 | Status: DISCONTINUED | OUTPATIENT
Start: 2019-01-10 | End: 2019-01-11

## 2019-01-10 RX ORDER — CEFTRIAXONE 500 MG/1
1 INJECTION, POWDER, FOR SOLUTION INTRAMUSCULAR; INTRAVENOUS EVERY 24 HOURS
Qty: 0 | Refills: 0 | Status: DISCONTINUED | OUTPATIENT
Start: 2019-01-11 | End: 2019-01-11

## 2019-01-10 RX ORDER — RIVAROXABAN 15 MG-20MG
20 KIT ORAL EVERY 24 HOURS
Qty: 0 | Refills: 0 | Status: DISCONTINUED | OUTPATIENT
Start: 2019-01-10 | End: 2019-01-19

## 2019-01-10 RX ORDER — ASPIRIN/CALCIUM CARB/MAGNESIUM 324 MG
81 TABLET ORAL DAILY
Qty: 0 | Refills: 0 | Status: DISCONTINUED | OUTPATIENT
Start: 2019-01-10 | End: 2019-01-19

## 2019-01-10 RX ORDER — DIPHENHYDRAMINE HCL 50 MG
25 CAPSULE ORAL AT BEDTIME
Qty: 0 | Refills: 0 | Status: DISCONTINUED | OUTPATIENT
Start: 2019-01-10 | End: 2019-01-19

## 2019-01-10 RX ORDER — ONDANSETRON 8 MG/1
1 TABLET, FILM COATED ORAL
Qty: 0 | Refills: 0 | COMMUNITY

## 2019-01-10 RX ORDER — LOSARTAN POTASSIUM 100 MG/1
0 TABLET, FILM COATED ORAL
Qty: 0 | Refills: 0 | COMMUNITY

## 2019-01-10 RX ORDER — SOLIFENACIN SUCCINATE 10 MG/1
1 TABLET ORAL
Qty: 0 | Refills: 0 | COMMUNITY

## 2019-01-10 RX ORDER — ESCITALOPRAM OXALATE 10 MG/1
0 TABLET, FILM COATED ORAL
Qty: 0 | Refills: 0 | COMMUNITY

## 2019-01-10 RX ORDER — CIPROFLOXACIN LACTATE 400MG/40ML
1 VIAL (ML) INTRAVENOUS
Qty: 0 | Refills: 0 | COMMUNITY

## 2019-01-10 RX ORDER — LACTOBACILLUS ACIDOPHILUS 100MM CELL
1 CAPSULE ORAL
Qty: 0 | Refills: 0 | Status: DISCONTINUED | OUTPATIENT
Start: 2019-01-10 | End: 2019-01-19

## 2019-01-10 RX ORDER — PANTOPRAZOLE SODIUM 20 MG/1
0 TABLET, DELAYED RELEASE ORAL
Qty: 0 | Refills: 0 | COMMUNITY

## 2019-01-10 RX ORDER — DIAZEPAM 5 MG
0 TABLET ORAL
Qty: 0 | Refills: 0 | COMMUNITY

## 2019-01-10 RX ORDER — CHOLECALCIFEROL (VITAMIN D3) 125 MCG
1000 CAPSULE ORAL
Qty: 0 | Refills: 0 | COMMUNITY

## 2019-01-10 RX ORDER — LANOLIN ALCOHOL/MO/W.PET/CERES
5 CREAM (GRAM) TOPICAL AT BEDTIME
Qty: 0 | Refills: 0 | Status: DISCONTINUED | OUTPATIENT
Start: 2019-01-10 | End: 2019-01-19

## 2019-01-10 RX ORDER — IRON SUCROSE 20 MG/ML
142 INJECTION, SOLUTION INTRAVENOUS
Qty: 0 | Refills: 0 | COMMUNITY

## 2019-01-10 RX ORDER — CEFTRIAXONE 500 MG/1
1 INJECTION, POWDER, FOR SOLUTION INTRAMUSCULAR; INTRAVENOUS ONCE
Qty: 0 | Refills: 0 | Status: COMPLETED | OUTPATIENT
Start: 2019-01-10 | End: 2019-01-10

## 2019-01-10 RX ADMIN — CEFTRIAXONE 1 GRAM(S): 500 INJECTION, POWDER, FOR SOLUTION INTRAMUSCULAR; INTRAVENOUS at 16:26

## 2019-01-10 RX ADMIN — CEFTRIAXONE 100 GRAM(S): 500 INJECTION, POWDER, FOR SOLUTION INTRAMUSCULAR; INTRAVENOUS at 15:06

## 2019-01-10 NOTE — H&P ADULT - NEUROLOGICAL DETAILS
sensation intact/deep reflexes intact/responds to pain/responds to verbal commands/alert and oriented x 3

## 2019-01-10 NOTE — ED PROVIDER NOTE - NS_ ATTENDINGSCRIBEDETAILS _ED_A_ED_FT
Antonio Acosta MD - The scribe's documentation has been prepared under my direction and personally reviewed by me in its entirety. I confirm that the note above accurately reflects all work, treatment, procedures, and medical decision making performed by me.

## 2019-01-10 NOTE — ED PROVIDER NOTE - MEDICAL DECISION MAKING DETAILS
change in mental status, erratic behavior, self inflicted wounds, will obtain labs, episode of verbalization of suicidal thought,  ct head, psych eval

## 2019-01-10 NOTE — ED BEHAVIORAL HEALTH ASSESSMENT NOTE - PSYCHIATRIC ISSUES AND PLAN (INCLUDE STANDING AND PRN MEDICATION)
defer psychotropic initiation pending further work-up and assessment by consultation team; would encourage obtaining additional information about medical history including recent 6-7 month admission

## 2019-01-10 NOTE — ED BEHAVIORAL HEALTH ASSESSMENT NOTE - RISK ASSESSMENT
At this time, patient continues to manifest a potential imminent risk to herself given her recent behavior. She requires ongoing monitoring in an acute medical setting.

## 2019-01-10 NOTE — ED BEHAVIORAL HEALTH ASSESSMENT NOTE - DESCRIPTION
gastric bypass (2016), diverticulitis, cooperative    Vital Signs Last 24 Hrs  T(C): 37.2 (10 Jorge 2019 15:01), Max: 37.2 (10 Jorge 2019 15:01)  T(F): 99 (10 Jorge 2019 15:01), Max: 99 (10 Jorge 2019 15:01)  HR: 66 (10 Jorge 2019 15:06) (65 - 91)  BP: 158/88 (10 Jorge 2019 15:06) (148/113 - 163/96)  BP(mean): --  RR: 15 (10 Jorge 2019 15:06) (15 - 17)  SpO2: 99% (10 Jorge 2019 15:06) (99% - 100%) gastric bypass, fistula of intestine, diverticulitis, DVT, HTN, GERD, colon resection Lives with  Per ED nurse Tyshawn patient presented to the ED at 10:52.Patient given 1mg  IV Rocephin  given at 14:30 ,  telepsych consulted at 14:43. Patient tolerate triage wanding, gowning   is in possession of all personal belongings.  Patient appears disheveled  and unkempt, her hands appear with a dark film on them and she is malodorous. Patient presents with clear speech, she is alert oriented x3, her thoughts with ED nurse are linear and logical but she lacks perception, judgement and insight. Patient denies SHIIP, denies C/AH. A catheter placed for patients incontinence which she has since pulled. No restraints utilized she has remained in fair behavior, requesting to leave but able to be verbally redirected and able to follow all commands. Patients  is at bedside, no issues noted in their interactions.     Vital Signs Last 24 Hrs  T(C): 37.2 (10 Jorge 2019 15:01), Max: 37.2 (10 Jorge 2019 15:01)  T(F): 99 (10 Jorge 2019 15:01), Max: 99 (10 Jorge 2019 15:01)  HR: 66 (10 Jorge 2019 15:06) (65 - 91)  BP: 158/88 (10 Jorge 2019 15:06) (148/113 - 163/96)  BP(mean): --  RR: 15 (10 Jorge 2019 15:06) (15 - 17)  SpO2: 99% (10 Jorge 2019 15:06) (99% - 100%) Lives with , retired

## 2019-01-10 NOTE — ED BEHAVIORAL HEALTH ASSESSMENT NOTE - HPI (INCLUDE ILLNESS QUALITY, SEVERITY, DURATION, TIMING, CONTEXT, MODIFYING FACTORS, ASSOCIATED SIGNS AND SYMPTOMS)
walked to corner to [facility],  worried she would be disoriented. Denies saying anything about "ending it all".     Mri showed a minor stroke.     Poor sleep, frequent wakenings. 58 year old  female with history of anxiety, no prior hospitalizations, no prior suicide attempts, comes in with  who states that she has mentioned "I might as well be dead" when he found her walking down the block wandering the neighborhood. Pt states her  walked to the corner to [facility], and she states  worried she would be disoriented. She denies history of being disoriented. She states she thinks her  is worried about her because of her recent multiple medical problems. Denies saying anything about wanting to die, or any suicidal statements.     She is oriented to self, place, date. She appears to have good understanding of her medical history.     During assessment, pt is chewing on open wounds on her forearm. Pt denies this stating, "I'm licking scratches". When asked if she scratches herself, she states "no".      She states recently she has not been sleeping well, stating she frequently awakens. She denies any symptoms such as elevated or intensely irritable mood, grandiosity, overspending, increased energy, pressured speech or flight of ideas. Denies any depression or symptoms of depression. Denies any anxiety. Denies any psychotic symptoms, hallucinations. Mrs. 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, although this has been managed by her PMD with Lexapro for 20 years, without any history of prior psychiatric hospitalizations nor any prior self-harm or suicide attempts, with extensive complicated medical history of bariatric surgery with complication leading to a total gastrectomy, as well as diverticulitis, multiple DVTs, intestinal fistula, obesity, HTN, and a recent unspecified CVA, who now presents with ongoing and progressively declining functional behavior and disorientation, of unknown origin, but today including patient making a passive statement "I might as well be dead."     See below for the Emergency Department course.  Due to pt's presentation, the majority of reliable information is provided by her , Edvin, who provides the following collateral:  He endorses that patient has not been at baseline for the last 6.5 years since having a bariatric surgery, noting that she has been steadily declining for the last 6 months especially.  At baseline patient is anxious, but had been well functioning prior to this change, working previously as a  and having an honors degree from [email or web address]. She has never manifest any significant psychopathlogy, including any periods of significant depression, hypomania/aden, psychosis, OCD, or having a history of trauma. She is an anxious woman, but this has been moderate in intensity and stabilized with Lexapro 20 mg for 20 years. More recently, she has shown ongoing decline manifest as poor hygiene (wearing diapers and does not change herself, cutting the soiled portion out with scissors and/or spending several days soiled), with poor sleeping habits sleeping 3 hours a night (without any associated manic symptoms such as rapid pressured speech, racing thoughts), poor eating habits (hoarding food since her initial bariatric surgery), and she has presented increasingly disorganized since her discharged from [facility] in February 2018, where she had been for a near 7 month medical hospitalization. Psychiatry was consulted during this admission and did not conclude any acute psychopathology.  She has also been calling family members promising them elaborate trips and concerts for the last 6 months. At this time patient has had changes in functioning including, for the last three weeks the patient has been attempting to steal the car to spend beyond her means. Last week the patient went to the bank and opened a new debit card then proceeded to spend $800 at the local [employer].  She then took a cab to a local [employer] store where she attempted to buy a 5 karat ring, but the cab company contacted her  and informed him of the patients presents and failure to pay her cab fare. At this time the patient continuously attempts to leave the home unattended earlier she ran out the home this morning to buy press on nails from [employer] when the collateral when to go find the patient she began arguing with collateral and reported “ I rather not live like this” at which time collateral presented to the ED with patient out of concern “ she may hurt herself.      Collateral denies concern for violence, endorses concern for patient safety/ suicidal statement made today. He reports that he and his adult children have been considering adult long term care for the patient as this  presentation has been ongoing for years. Collateral feels that the patient attempting to leave the home and drive which she has been instructed not to do by doctors for the last month makes the patient unsafe to be at home alone. He is ambivalent if the patient warrants a psychiatric admission, in agreement with medical admission and work up.      comes in with  who states that she has mentioned "I might as well be dead" when he found her walking down the block wandering the neighborhood. Pt states her  walked to the corner to [facility], and she states  worried she would be disoriented. She denies history of being disoriented. She states she thinks her  is worried about her because of her recent multiple medical problems. Denies saying anything about wanting to die, or any suicidal statements.     She is oriented to self, place, date. She appears to have good understanding of her medical history.     During assessment, pt is chewing on open wounds on her forearm. Pt denies this stating, "I'm licking scratches". When asked if she scratches herself, she states "no".      She states recently she has not been sleeping well, stating she frequently awakens. She denies any symptoms such as elevated or intensely irritable mood, grandiosity, overspending, increased energy, pressured speech or flight of ideas. Denies any depression or symptoms of depression. Denies any anxiety. Denies any psychotic symptoms, hallucinations. Mrs. 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, although this has been managed by her PMD with Lexapro for 20 years, without any history of prior psychiatric hospitalizations nor any prior self-harm or suicide attempts, with extensive complicated medical history of bariatric surgery with complication leading to a total gastrectomy, as well as diverticulitis, multiple DVTs, intestinal fistula, obesity, HTN, and a recent unspecified CVA, who now presents with ongoing and progressively declining functional behavior and disorientation, of unknown origin, but today including patient making a passive statement "I might as well be dead."     See below for the Emergency Department course.  Due to pt's presentation, the majority of reliable information is provided by her , Edvin, who provides the following collateral:  He endorses that patient has not been at baseline for the last 6.5 years since having a bariatric surgery, noting that she has been steadily declining for the last 6 months especially.  At baseline patient is anxious, but had been well functioning prior to this change, working previously as a  and having an honors degree from [employer]. She has never manifest any significant psychopathlogy, including any periods of significant depression, hypomania/aden, psychosis, OCD, or having a history of trauma. She is an anxious woman, but this has been moderate in intensity and stabilized with Lexapro 20 mg for 20 years. More recently, she has shown ongoing decline manifest as poor hygiene (wearing diapers and does not change herself, cutting the soiled portion out with scissors and/or spending several days soiled), with poor sleeping habits sleeping 3 hours a night (without any associated manic symptoms such as rapid pressured speech, racing thoughts), poor eating habits (hoarding food since her initial bariatric surgery), and she has presented increasingly disorganized since her discharged from [facility] in February 2018, where she had been for a near 7 month medical hospitalization. Psychiatry was consulted during this admission and did not conclude any acute psychopathology.  She has also been calling family members promising them elaborate trips and concerts for the last 6 months. At this time patient has had changes in functioning including, for the last three weeks the patient has been attempting to steal the car to spend beyond her means. Last week the patient went to the bank and opened a new debit card then proceeded to spend $800 at the local [employer].  She then took a cab to a local [employer] store where she attempted to buy a 5 karat ring, but the cab company contacted her  and informed him of the patients presents and failure to pay her cab fare. At this time the patient continuously attempts to leave the home unattended earlier she ran out the home this morning to buy press on nails from [employer] when the collateral when to go find the patient she began arguing with collateral and reported “ I rather not live like this...I may as well be dead” at which time collateral presented to the ED with patient out of concern that "she may hurt herself."  During her time in the Emergency Department, she has consistently denied any thoughts of self-harm or suicide. Collateral denies concern for violence, endorses concern for patient safety/ suicidal statement made today. He reports that he and his adult children have been considering adult long term care for the patient as this  presentation has been ongoing for years. Carolyn feels that the patient attempting to leave the home and drive which she has been instructed not to do by doctors for the last month makes the patient unsafe to be at home alone. He is ambivalent if the patient warrants a psychiatric admission and is in agreement with medical admission and work up.      Pt seen individually and she says that she had walked to [facility], but that her  was "worried she would be disoriented."  She herself denies any periods of confusion or disorientation and says that she feels her  is worried about her because of her recent multiple medical problems. Denies saying anything about wanting to die, or any suicidal statements. She discusses her medical situation in brief, and feels that this presentation is related to that. During assessment, pt is chewing on open wounds on her forearm. Pt denies this stating, "I'm licking scratches". When asked if she scratches herself, she states "no," denying any thoughts to harm self in any way.   She states recently she has not been sleeping well, stating she frequently awakens, but that this is not attached to any periods of increased energy, elated or euphoric mood. Denies depressed mood/anhedonia. Patient denies any psychotic symptoms including paranoia, ideas of reference, thought insertion/broadcasting, or auditory/visual/olfactory/tactile/gustatory hallucinations.

## 2019-01-10 NOTE — ED ADULT NURSE NOTE - OBJECTIVE STATEMENT
as per pt's , patient's behavior is not normal, picking her skins and eating food from dirty floor, not taking care herself, noted her clothes are dirty and blood marks from her scabs, poor hygiene and nails are very short, PCA sitting with patient for safety, patient denies SI/HI at this time, patient never been dx with any psych belongings in 1 north hold drawer, valuable to , Pt is in no acute distress. denies any pain and pending telepsych.

## 2019-01-10 NOTE — ED BEHAVIORAL HEALTH ASSESSMENT NOTE - CURRENT MEDICATION
lexapro 20 mg PO daily, zofran 4 mg PO Q8H PRN, Vitamin B-12 250 mcg PO daily, Vesicare 10 mg PO daily, protonix, cozaar

## 2019-01-10 NOTE — ED PROVIDER NOTE - CARE PLAN
Principal Discharge DX:	Change in mental status Principal Discharge DX:	Change in mental status  Secondary Diagnosis:	UTI (urinary tract infection)

## 2019-01-10 NOTE — ED PROVIDER NOTE - PROGRESS NOTE DETAILS
Attending Contribution to Care: 57 y/o F pt presents to the ED for psych evaluation. States she is in ED because she went to [facility] when her  told her not to. Denies suicidal/homicidal ideations, hallucinations, pain.   PE: alert, no complaints, eating tuna fish sandwich very contently.   Plan: medical clearance, consult telepsych. call out to Telepscyh awaiting call back spoke with telepsych Dr Wallace, advised have neuro see patient,  he will also speak with patient  call out to Dr Vargas spoke with Dr Vargas, case discussed, results discussed, advised have patient admitted to [facility], case does not sound like a primary neuro cause.  Dr Wallace, telepsych informed, stated he will see patient and discussed with Dr Vargas spoke with Dr Vargas, case discussed, results discussed, advised have patient start baby aspirin,  admitted to psych, case does not sound like a primary neuro cause.  Dr Wallace, telepsych informed, stated he will see patient and discuss with Dr Vargas spoke with Dr Varela, case discussed, results discussed, stated this is not a medicine admission, patient should be admitted to psych.  spoke with Dr Vargas, discussed, telepsych recommendations , stated he will speak with Telepscyh, can see patient this evening

## 2019-01-10 NOTE — ED BEHAVIORAL HEALTH ASSESSMENT NOTE - SUMMARY
58 year old  female with history of anxiety, no prior hospitalizations, no prior suicide attempts, comes in with  who states that she has mentioned "I might as well be dead" when he found her walking down the block wandering the neighborhood. Pt presents very oddly, chewing to the point of bleeding on her forearm, with blunted affect. She does not endorse any issues, however  reports ... Mrs. 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, although this has been managed by her PMD with Lexapro for 20 years, without any history of prior psychiatric hospitalizations nor any prior self-harm or suicide attempts, with extensive complicated medical history of bariatric surgery with complication leading to a total gastrectomy, as well as diverticulitis, multiple DVTs, intestinal fistula, obesity, HTN, and a recent unspecified CVA, who now presents with ongoing and progressively declining functional behavior and disorientation, of unknown origin, but today including patient making a passive statement "I might as well be dead." At present, the patient is presenting with a slow progressive decline in her functional status.  This is further explicitly characterized by periods of disorientation, oppositional and odd behavior, lying, illogical behavior and rationalization for her behaviors which are inconsistent with the actual behaviors.  In terms of etiology for such presentation in a 58 year old, it is the job of Psychiatry to evaluate for any acute psychopathology.  Although by themselves several of her symptoms, including insomnia, irritability and increased goal directed activity could be components of a hypomania or aden, they are non-specific unless clustered with other symptoms of hypomania/aden.  Furthermore, there is no other known history of episodic mood changes, hypomanic, manic, or depressive in nature which would serve as precursors to her current presentation. In addition, her decline in overall functional presentation, with progressive worsening of personal hygiene to a point that it completely inconsistent with her previous baseline mental status cannot be explained at this time by any specific psychopathology.  Rather, a psychiatric explanation for such symptoms is a diagnosis of exclusion.  In an effort to exclude all other possibilities, this writer incorporated the presented history which includes significant medical history of gastrectomy, multiple DVTs and a cerebellar CVA.  While these alone do not explain her current presentation, the benefit of additional medical, and more importantly, neurological evaluation, outweighs the benefit of a psychiatric hospitalization. While she has a functional decline, there is no specific psychiatric target as the overall conglomeration of symptomatic decline does not have an explanation at present. It is for this reason that a medical admission is advisable. Psychiatric consultation and collaboration is certainly important and essential to monitor her ongoing course, and to determine the most appropriate next step in care. However, short of a definitive medical and/or psychiatric diagnosis being made, there is an additional functional concern in regards to Mrs. Alatorre's inability to maintain ADLs and day to day functioning in a safe manner which precludes her ability to be discharged from acute care at this time. This alone establishes more than reasonable justification for an assessment of her overall functioning with the input of social work and occupational therapy to establish the most appropriate level of care for this woman.  As always, multidisciplinary collaboration is the essential element of Mrs. Alatorre's care at this time, and is likely to ensure the most appropriate delivery of care at this time.

## 2019-01-10 NOTE — ED BEHAVIORAL HEALTH ASSESSMENT NOTE - OTHER
recent medical issues, with question of change in mental status and behavioral changes disheveled chewing on forearm skin not assessed CHRIS BROUSSARDALBER recent disorganized behavior defer to Emergency Department assessment n/a

## 2019-01-10 NOTE — H&P ADULT - HISTORY OF PRESENT ILLNESS
58 y  old with PMH of gastrectomy intestinal fistulas diverticulitis, bariatric surg, multiple DVT, anxiety, insomnia, brought to ED by , states patient has been behaving more erratically, today patient left the house, he found her walking down the block, she has been making purchases on credit cards that she cannot pay for, she has begun to wear clothes that are stained, disheveled.    states she told him "I might as well be dead".   is concerned patient might harm herself.  states she did not state a plan of suicide.  2 mos ago was seen by neurologist Dr Joiner, had MRI done,  states he was told she had a stroke , but timeframe was not determined.  no recent illness or trauma.  patient is on lexapro for hx anxiety , prescribed by her PMD Dr Caldwell, had cardiac work up by Dr Antonio Thao who is also a cardiologist,  states stress and echo were normal.   states patient has hx of multiple surgeries, bariatric, and abdominal. patient does not have a psychiatrist as per .  no hallucination,not homicidal, no pranoia, no weakness.  pt had tele psych consult,-pt is noted to be having erratic behavioure and thinks that for her worsening mental condition she needs neuro assesement, as per Dr Muniz (Telepsych)Neurology consult done by DR Arreola,there is no localizing signs no evidence of CVA, doubt dementia,  IN ED urinanalysis is suspicious of UTI and started on ceftriaxone, cultures to be followed.  Being admitted for alered behaviour with suicidal ideation with uti, with h/o dvt and on xarelto, with h/o bariatric surg and complication following that.

## 2019-01-10 NOTE — ED PROVIDER NOTE - OBJECTIVE STATEMENT
dyspnea/ PE/ chronic A.FIB. cardiomyopathy/CAD 58 y brought to ED by , states patient has been behaving more erratically, today patient left the house, he found her walking down the block, she has been making purchases on credit cards that she cannot pay for, she has begun to wear clothes that are stained, disheveled.    states she told him "I might as well be dead".   is concerned patient might harm herself.  states she did not state a plan of suicide.  2 mos ago was seen by neurologist Dr Joiner, had MRI done,  states he was told she had a stroke , but timeframe was not determined.  no recent illness or trauma.  patient is on lexapro , prescribed by her PMD Dr Caldwell, had cardiac work up by Dr Thao who is also a cardiologist,  states stress and echo were normal.   states patient has hx of multiple surgeries, bariatric, and abdominal. 58 y brought to ED by , states patient has been behaving more erratically, today patient left the house, he found her walking down the block, she has been making purchases on credit cards that she cannot pay for, she has begun to wear clothes that are stained, disheveled.    states she told him "I might as well be dead".   is concerned patient might harm herself.  states she did not state a plan of suicide.  2 mos ago was seen by neurologist Dr Joiner, had MRI done,  states he was told she had a stroke , but timeframe was not determined.  no recent illness or trauma.  patient is on lexapro for hx anxiety , prescribed by her PMD Dr Caldwell, had cardiac work up by Dr Thao who is also a cardiologist,  states stress and echo were normal.   states patient has hx of multiple surgeries, bariatric, and abdominal. patient does not have a psychiatrist as per .  PMH:   Diverticulitis    DVT (deep venous thrombosis)  2013 neck  Fistula of intestine    Hypertension    Obesity    Reflux    Washed out 58 y brought to ED by , states patient has been behaving more erratically, today patient left the house, he found her walking down the block, she has been making purchases on credit cards that she cannot pay for, she has begun to wear clothes that are stained, disheveled.    states she told him "I might as well be dead".   is concerned patient might harm herself.  states she did not state a plan of suicide.  2 mos ago was seen by neurologist Dr Joiner, had MRI done,  states he was told she had a stroke , but timeframe was not determined.  no recent illness or trauma.  patient is on lexapro for hx anxiety , prescribed by her PMD Dr Caldwell, had cardiac work up by Dr Antonio Thao who is also a cardiologist,  states stress and echo were normal.   states patient has hx of multiple surgeries, bariatric, and abdominal. patient does not have a psychiatrist as per .  PMH:   Diverticulitis    DVT (deep venous thrombosis)  2013 neck  Fistula of intestine    Hypertension    Obesity    Reflux    Washed out

## 2019-01-10 NOTE — H&P ADULT - ATTENDING COMMENTS
60 minutes spent on this visit, 50% visit time spent in care co-ordination with other attendings and counselling patient  I have discussed care plan with patient and HCP,expressed understanding of problems treatment and their effect and side effects, alternatives in detail,I have asked if they have any questions and concerns and appropriately addressed them to best of my ability  Reviewed all diagonostic tests, lab results and drug drug interactions, and medications  had detailed discussion with  about her condition, pt has been having repeated admissions to hospitals and rehabs over last 2 yrs

## 2019-01-10 NOTE — ED BEHAVIORAL HEALTH ASSESSMENT NOTE - OTHER PAST PSYCHIATRIC HISTORY (INCLUDE DETAILS REGARDING ONSET, COURSE OF ILLNESS, INPATIENT/OUTPATIENT TREATMENT)
Denies hx of suicide attempts, hospitalizations.   Takes lexapro 20 mg PO Daily, been on it for 20 years. Denies hx of suicide attempts, hospitalizations.   Takes lexapro 20 mg PO Daily, been on it for 20 years, prescribed by PMD see above

## 2019-01-11 LAB
ANION GAP SERPL CALC-SCNC: 8 MMOL/L — SIGNIFICANT CHANGE UP (ref 5–17)
BASOPHILS # BLD AUTO: 0.02 K/UL — SIGNIFICANT CHANGE UP (ref 0–0.2)
BASOPHILS NFR BLD AUTO: 0.3 % — SIGNIFICANT CHANGE UP (ref 0–2)
BUN SERPL-MCNC: 25 MG/DL — HIGH (ref 7–23)
CALCIUM SERPL-MCNC: 8.9 MG/DL — SIGNIFICANT CHANGE UP (ref 8.4–10.5)
CHLORIDE SERPL-SCNC: 108 MMOL/L — SIGNIFICANT CHANGE UP (ref 96–108)
CO2 SERPL-SCNC: 27 MMOL/L — SIGNIFICANT CHANGE UP (ref 22–31)
CREAT SERPL-MCNC: 0.83 MG/DL — SIGNIFICANT CHANGE UP (ref 0.5–1.3)
EOSINOPHIL # BLD AUTO: 0.17 K/UL — SIGNIFICANT CHANGE UP (ref 0–0.5)
EOSINOPHIL NFR BLD AUTO: 2.8 % — SIGNIFICANT CHANGE UP (ref 0–6)
FOLATE SERPL-MCNC: >20 NG/ML — SIGNIFICANT CHANGE UP
GLUCOSE SERPL-MCNC: 91 MG/DL — SIGNIFICANT CHANGE UP (ref 70–99)
HCT VFR BLD CALC: 34.9 % — SIGNIFICANT CHANGE UP (ref 34.5–45)
HGB BLD-MCNC: 11.1 G/DL — LOW (ref 11.5–15.5)
IMM GRANULOCYTES NFR BLD AUTO: 0.3 % — SIGNIFICANT CHANGE UP (ref 0–1.5)
LYMPHOCYTES # BLD AUTO: 1.97 K/UL — SIGNIFICANT CHANGE UP (ref 1–3.3)
LYMPHOCYTES # BLD AUTO: 32.2 % — SIGNIFICANT CHANGE UP (ref 13–44)
MCHC RBC-ENTMCNC: 28.8 PG — SIGNIFICANT CHANGE UP (ref 27–34)
MCHC RBC-ENTMCNC: 31.8 GM/DL — LOW (ref 32–36)
MCV RBC AUTO: 90.6 FL — SIGNIFICANT CHANGE UP (ref 80–100)
MONOCYTES # BLD AUTO: 0.41 K/UL — SIGNIFICANT CHANGE UP (ref 0–0.9)
MONOCYTES NFR BLD AUTO: 6.7 % — SIGNIFICANT CHANGE UP (ref 2–14)
NEUTROPHILS # BLD AUTO: 3.53 K/UL — SIGNIFICANT CHANGE UP (ref 1.8–7.4)
NEUTROPHILS NFR BLD AUTO: 57.7 % — SIGNIFICANT CHANGE UP (ref 43–77)
NRBC # BLD: 0 /100 WBCS — SIGNIFICANT CHANGE UP (ref 0–0)
PLATELET # BLD AUTO: 206 K/UL — SIGNIFICANT CHANGE UP (ref 150–400)
POTASSIUM SERPL-MCNC: 4.1 MMOL/L — SIGNIFICANT CHANGE UP (ref 3.5–5.3)
POTASSIUM SERPL-SCNC: 4.1 MMOL/L — SIGNIFICANT CHANGE UP (ref 3.5–5.3)
RBC # BLD: 3.85 M/UL — SIGNIFICANT CHANGE UP (ref 3.8–5.2)
RBC # FLD: 14.3 % — SIGNIFICANT CHANGE UP (ref 10.3–14.5)
SODIUM SERPL-SCNC: 143 MMOL/L — SIGNIFICANT CHANGE UP (ref 135–145)
T3 SERPL-MCNC: 90 NG/DL — SIGNIFICANT CHANGE UP (ref 80–200)
T4 AB SER-ACNC: 5.6 UG/DL — SIGNIFICANT CHANGE UP (ref 4.6–12)
TSH SERPL-MCNC: 0.83 UIU/ML — SIGNIFICANT CHANGE UP (ref 0.27–4.2)
TSH SERPL-MCNC: 1.71 UIU/ML — SIGNIFICANT CHANGE UP (ref 0.27–4.2)
VIT B12 SERPL-MCNC: 661 PG/ML — SIGNIFICANT CHANGE UP (ref 232–1245)
WBC # BLD: 6.12 K/UL — SIGNIFICANT CHANGE UP (ref 3.8–10.5)
WBC # FLD AUTO: 6.12 K/UL — SIGNIFICANT CHANGE UP (ref 3.8–10.5)

## 2019-01-11 RX ORDER — DIAZEPAM 5 MG
10 TABLET ORAL AT BEDTIME
Qty: 0 | Refills: 0 | Status: DISCONTINUED | OUTPATIENT
Start: 2019-01-11 | End: 2019-01-12

## 2019-01-11 RX ORDER — ESCITALOPRAM OXALATE 10 MG/1
10 TABLET, FILM COATED ORAL DAILY
Qty: 0 | Refills: 0 | Status: DISCONTINUED | OUTPATIENT
Start: 2019-01-11 | End: 2019-01-13

## 2019-01-11 RX ORDER — QUETIAPINE FUMARATE 200 MG/1
25 TABLET, FILM COATED ORAL AT BEDTIME
Qty: 0 | Refills: 0 | Status: DISCONTINUED | OUTPATIENT
Start: 2019-01-11 | End: 2019-01-15

## 2019-01-11 RX ADMIN — RIVAROXABAN 20 MILLIGRAM(S): KIT at 17:49

## 2019-01-11 RX ADMIN — Medication 1 TABLET(S): at 10:52

## 2019-01-11 RX ADMIN — Medication 25 MILLIGRAM(S): at 00:38

## 2019-01-11 RX ADMIN — Medication 81 MILLIGRAM(S): at 13:28

## 2019-01-11 RX ADMIN — Medication 5 MILLIGRAM(S): at 21:05

## 2019-01-11 RX ADMIN — ESCITALOPRAM OXALATE 20 MILLIGRAM(S): 10 TABLET, FILM COATED ORAL at 13:27

## 2019-01-11 RX ADMIN — Medication 1 TABLET(S): at 17:49

## 2019-01-11 RX ADMIN — Medication 5 MILLIGRAM(S): at 00:38

## 2019-01-11 RX ADMIN — QUETIAPINE FUMARATE 25 MILLIGRAM(S): 200 TABLET, FILM COATED ORAL at 21:05

## 2019-01-11 RX ADMIN — Medication 10 MILLIGRAM(S): at 21:05

## 2019-01-11 NOTE — PROGRESS NOTE ADULT - SUBJECTIVE AND OBJECTIVE BOX
Neurology follow up note    AISSATOU [id number]      Interval History:    Patient feels ok no new complaints.    MEDICATIONS    aspirin  chewable 81 milliGRAM(s) Oral daily  cefTRIAXone   IVPB 1 Gram(s) IV Intermittent every 24 hours  diphenhydrAMINE 25 milliGRAM(s) Oral at bedtime PRN  escitalopram 20 milliGRAM(s) Oral daily  lactobacillus acidophilus 1 Tablet(s) Oral two times a day with meals  melatonin 5 milliGRAM(s) Oral at bedtime  rivaroxaban 20 milliGRAM(s) Oral every 24 hours      Allergies    sulfa drugs (Unknown)  sulfamethoxazole (Other)    Intolerances        Height (cm): 160.02 (01-10 @ 10:55)  Weight (kg): 63 (01-10 @ 10:55)  BMI (kg/m2): 24.6 (01-10 @ 10:55)    Vital Signs Last 24 Hrs  T(C): 37.1 (10 Jorge 2019 22:26), Max: 37.2 (10 Jorge 2019 15:01)  T(F): 98.7 (10 Jorge 2019 22:26), Max: 99 (10 Jorge 2019 15:01)  HR: 86 (10 Jorge 2019 22:26) (65 - 91)  BP: 152/90 (10 Jorge 2019 22:26) (139/87 - 163/96)  BP(mean): --  RR: 18 (10 Jorge 2019 22:26) (15 - 18)  SpO2: 98% (10 Jorge 2019 22:26) (96% - 100%)      REVIEW OF SYSTEMS:   Constitutional: No fever, chills, fatigue, weakness  Eyes: no eye pain, visual disturbances, or discharge  ENT:  No difficulty hearing, tinnitus, vertigo; No sinus or throat pain  Neck: No pain or stiffness  Respiratory: No cough, dyspnea, wheezing   Cardiovascular: No chest pain, palpitations,   Gastrointestinal: No abdominal or epigastric pain. No nausea, vomiting  No diarrhea or constipation.   Genitourinary: No dysuria, frequency, hematuria or incontinence  Neurological: No headaches, lightheadedness, vertigo, numbness or tremors  Psychiatric: No depression, anxiety, mood swings or difficulty sleeping  Musculoskeletal: No joint pain or swelling; No muscle, back or extremity pain  Skin: No itching, burning, rashes or lesions   Lymph Nodes: No enlarged glands  Endocrine: No heat or cold intolerance; No hair loss   Allergy and Immunologic: No hives or eczema    On Neurological Examination:    Mental Status - Patient is alert, awake, oriented X3.   appears to answer all questions correctly  recall 3/3    Follow simple commands  Follow complex commands      Speech -   Fluent                           Cranial Nerves - Pupils 3 mm equal and reactive to light,   extraocular eye movements intact.   smile symmetric  intact bilateral NLF    Motor Exam -   Right upper 5/5  Left upper 5/5  Right lower 5/5  Left lower 5/5    Muscle tone - is normal all over.  No asymmetry is seen.      Sensory    Bilateral intact to light touch    Gait -  normal  ataxia     GENERAL Exam: Nontoxic , No Acute Distress   	  HEENT:  normocephalic, atraumatic  		  LUNGS: Clear bilaterally    	  HEART: Normal S1S2   No murmur RRR        	  GI/ ABDOMEN:  Soft  Non tender    EXTREMITIES:   No Edema  No Clubbing  No Cyanosis     MUSCULOSKELETAL: Normal Range of Motion  	   SKIN: Normal  No Ecchymosis               LABS:  CBC Full  -  ( 2019 07:55 )  WBC Count : 6.12 K/uL  Hemoglobin : 11.1 g/dL  Hematocrit : 34.9 %  Platelet Count - Automated : 206 K/uL  Mean Cell Volume : 90.6 fl  Mean Cell Hemoglobin : 28.8 pg  Mean Cell Hemoglobin Concentration : 31.8 gm/dL  Auto Neutrophil # : 3.53 K/uL  Auto Lymphocyte # : 1.97 K/uL  Auto Monocyte # : 0.41 K/uL  Auto Eosinophil # : 0.17 K/uL  Auto Basophil # : 0.02 K/uL  Auto Neutrophil % : 57.7 %  Auto Lymphocyte % : 32.2 %  Auto Monocyte % : 6.7 %  Auto Eosinophil % : 2.8 %  Auto Basophil % : 0.3 %    Urinalysis Basic - ( 10 Jorge 2019 14:03 )    Color: Yellow / Appearance: Clear / S.020 / pH: x  Gluc: x / Ketone: Negative  / Bili: Negative / Urobili: 4 mg/dL   Blood: x / Protein: 15 mg/dL / Nitrite: Positive   Leuk Esterase: Small / RBC: 0-2 /HPF / WBC 3-5   Sq Epi: x / Non Sq Epi: Few / Bacteria: Moderate      -10    141  |  107  |  27<H>  ----------------------------<  95  3.9   |  27  |  0.88    Ca    9.0      10 Jorge 2019 12:09    TPro  6.8  /  Alb  3.2<L>  /  TBili  0.6  /  DBili  x   /  AST  17  /  ALT  21  /  AlkPhos  118  01-10    Hemoglobin A1C:     LIVER FUNCTIONS - ( 10 Jorge 2019 12:09 )  Alb: 3.2 g/dL / Pro: 6.8 g/dL / ALK PHOS: 118 U/L / ALT: 21 U/L DA / AST: 17 U/L / GGT: x           Vitamin B12         RADIOLOGY    CMS unclear etiology -- overall appears to answer question and behaves accordingly    as per my conversation with spouse  has episodes not listening sneaking out of house spending money   stating she bought things --- lies about things  check basic labs  DOUBT ENCEPHALITIS  OR SEIZURES , CVA IN CEREBELLUM VERY SMALL WOULD CAUSE CMS that been occurring for past 6 months  will spoke to outside neurologist as per my conversation with spouse has had work up   at present treatment with mood stabilizer if psych feel it would be appropriate to help with overall issues   spouse does not feel save in house with patient do to her behavior at times       Physical therapy evaluation.  OOB to chair/ambulation with assistance only.    Greater than 45 minutes spent in direct patient care reviewing  the notes, lab data/ imaging , discussion with multidisciplinary team.

## 2019-01-11 NOTE — BEHAVIORAL HEALTH ASSESSMENT NOTE - ESTIMATED INTELLIGENCE
----- Message from Nathan Hernandes sent at 8/2/2018  4:47 PM CDT -----  Contact: Laney  Needs Advice    Reason for call:   Ms. Houser would like for you to contact her because she is seeing a bright light when her eyes are open or closed. It started around 2pm   Communication Preference:[phone]  Additional Information:    
Spoke with pt who states that she was seeing a light when here eyes are open or closed informed that that the reason she was in yesterday and the doctor said that was normal, pt proceeded by say the doctor said that if she has anything different and she does the light is like a shooting star happened yesterday and around 2 in the morning.  Happened 5 times since yesterdays visit.  Informed will give the information to the doctor when he gets in and someone will be giving a call back.  
Average

## 2019-01-11 NOTE — BEHAVIORAL HEALTH ASSESSMENT NOTE - OTHER PAST PSYCHIATRIC HISTORY (INCLUDE DETAILS REGARDING ONSET, COURSE OF ILLNESS, INPATIENT/OUTPATIENT TREATMENT)
History of psychiatric treatment while in inpatient medical unit, currently on Lexapro 20 mg po daily, and Valium (unclear what dose)

## 2019-01-11 NOTE — BEHAVIORAL HEALTH ASSESSMENT NOTE - HPI (INCLUDE ILLNESS QUALITY, SEVERITY, DURATION, TIMING, CONTEXT, MODIFYING FACTORS, ASSOCIATED SIGNS AND SYMPTOMS)
Patient seen, evaluated and chart reviewed. Patient is a 57 y/o MWF, retired (patient was offered early [facility] due to medical issues and inability to function at full capacity), no prior psychiatric hospitalizations, who was initially brought to the hospital by her  for change in mental status and reported suicidal ideation. Patient reportedly has become increasingly erratic for the last 3 weeks, with her randomly leaving the house, without a car (patient is not allowed to drive) and once taking the car, with increasingly elevated energy, increase in goal-oriented activity, flight of ideas, grandiosity. She at the same time exhibited significant decreased in her personal hygiene, and difficulty taking care of her ADLs.  She reportedly left the house and bought some items and came back. Patient on several occasions attempted to make inappropriate purchases (once trying to buy a $50K christopher ring, another spending $1000 on materials in [employer]). Patient has reportedly been doing well prior to the bariatric surgery 6 years ago ([facility] [address] [facility], she lost 150 pounds), and since the surgery she deteriorated in terms of her ability to care for self, and would not comply with treatment. She had to have several additional surgeries, resulting in two long stays in [facility] - one for 6 months, another 8 months. Currently she presents in a hypomanic manner, making inappropriate statements (asking the undersigned to order food, asking personal questions), and inappropriate behavior (patient was drawing pictures and then she would throw everything on the floor). Currently she has no immediate needs except asking for medications for sleep.

## 2019-01-11 NOTE — BEHAVIORAL HEALTH ASSESSMENT NOTE - SUMMARY
Mrs. 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

## 2019-01-11 NOTE — BEHAVIORAL HEALTH ASSESSMENT NOTE - NSBHCONSULTMEDS_PSY_A_CORE FT
Decrease Lexapro to 10 mg po daily, Valium 10 mg po at HS, Seroquel 25 mg po at HS, Ativan 1 mg po q 6 hourly - anxiety.

## 2019-01-11 NOTE — PROGRESS NOTE ADULT - SUBJECTIVE AND OBJECTIVE BOX
Patient is a 58y old  Female who presents with a chief complaint of     INTERVAL HPI/OVERNIGHT EVENTS:    Home Medications:  aspirin 81 mg oral tablet, chewable: 1 tab(s) orally once a day (10 Jorge 2019 20:50)  Benadryl 25 mg oral capsule: orally once a day (at bedtime) (10 Jorge 2019 20:50)  biotin 1000 mcg oral tablet: 1 tab(s) orally once a day (10 Jorge 2019 20:50)  escitalopram 20 mg oral tablet: 1 tab(s) orally once a day (10 Jorge 2019 20:50)  folic acid 0.8 mg oral tablet: 1 tab(s) orally once a day (10 Jorge 2019 20:50)  Melatonin 5 mg oral tablet: tab(s) orally once a day (at bedtime) (10 Jorge 2019 20:50)  Multi Vitamin+: orally once a day (10 Jorge 2019 20:50)  tolterodine 4 mg oral capsule, extended release: 1 cap(s) orally once a day (10 Jorge 2019 20:50)  Vitamin B-12 250 mcg oral tablet: 1 tab(s) orally once a day (10 Jorge 2019 20:50)  Vitamin D3 2000 intl units oral capsule: 1 cap(s) orally once a day (10 Jorge 2019 20:50)  Xarelto 20 mg oral tablet: 1 tab(s) orally once a day (in the evening) (10 Jorge 2019 20:50)      MEDICATIONS  (STANDING):  aspirin  chewable 81 milliGRAM(s) Oral daily  cefTRIAXone   IVPB 1 Gram(s) IV Intermittent every 24 hours  escitalopram 20 milliGRAM(s) Oral daily  lactobacillus acidophilus 1 Tablet(s) Oral two times a day with meals  melatonin 5 milliGRAM(s) Oral at bedtime  rivaroxaban 20 milliGRAM(s) Oral every 24 hours    MEDICATIONS  (PRN):  diphenhydrAMINE 25 milliGRAM(s) Oral at bedtime PRN Rash and/or Itching      Allergies    sulfa drugs (Unknown)  sulfamethoxazole (Other)    Intolerances        REVIEW OF SYSTEMS:  CONSTITUTIONAL: No fever, weight loss, or fatigue  EYES: No eye pain, visual disturbances, or discharge  ENMT:  No difficulty hearing, tinnitus, vertigo; No sinus or throat pain  NECK: No pain or stiffness  BREASTS: No pain, masses, or nipple discharge  RESPIRATORY: No cough, wheezing, chills or hemoptysis; No shortness of breath  CARDIOVASCULAR: No chest pain, palpitations, dizziness, or leg swelling  GASTROINTESTINAL: No abdominal or epigastric pain. No nausea, vomiting, or hematemesis; No diarrhea or constipation. No melena or hematochezia.  GENITOURINARY: No dysuria, frequency, hematuria, or incontinence  NEUROLOGICAL: No headaches, memory loss, loss of strength, numbness, or tremors  SKIN: No itching, burning, rashes, or lesions   LYMPH NODES: No enlarged glands  ENDOCRINE: No heat or cold intolerance; No hair loss  MUSCULOSKELETAL: No joint pain or swelling; No muscle, back, or extremity pain  PSYCHIATRIC: No depression, anxiety, mood swings, or difficulty sleeping  HEME/LYMPH: No easy bruising, or bleeding gums  ALLERGY AND IMMUNOLOGIC: No hives or eczema    Vital Signs Last 24 Hrs  T(C): 37.1 (10 Jorge 2019 22:26), Max: 37.2 (10 Jorge 2019 15:01)  T(F): 98.7 (10 Jorge 2019 22:26), Max: 99 (10 Jorge 2019 15:01)  HR: 86 (10 Jorge 2019 22:26) (65 - 86)  BP: 152/90 (10 Jorge 2019 22:26) (139/87 - 163/96)  BP(mean): --  RR: 18 (10 Jorge 2019 22:26) (15 - 18)  SpO2: 98% (10 Jorge 2019 22:26) (96% - 100%)    PHYSICAL EXAM:  GENERAL: NAD, well-groomed, well-developed  HEAD:  Atraumatic, Normocephalic  EYES: EOMI, PERRLA, conjunctiva and sclera clear  ENMT: Moist mucous membranes,   NECK: Supple, No JVD, Normal thyroid  NERVOUS SYSTEM:  Alert & Oriented X3, Good concentration; Motor Strength 5/5 B/L upper and lower extremities; DTRs 2+ intact and symmetric  CHEST/LUNG: Clear to percussion bilaterally; No rales, rhonchi, wheezing, or rubs  HEART: Regular rate and rhythm; No murmurs, rubs, or gallops  ABDOMEN: Soft, Nontender, Nondistended; Bowel sounds present  EXTREMITIES:  2+ Peripheral Pulses, No clubbing, cyanosis, or edema  LYMPH: No lymphadenopathy noted  SKIN: No rashes or lesions    LABS:                        11.1   6.12  )-----------( 206      ( 2019 07:55 )             34.9     01-11    143  |  108  |  25<H>  ----------------------------<  91  4.1   |  27  |  0.83    Ca    8.9      2019 07:55    TPro  6.8  /  Alb  3.2<L>  /  TBili  0.6  /  DBili  x   /  AST  17  /  ALT  21  /  AlkPhos  118  01-10      Urinalysis Basic - ( 10 Jorge 2019 14:03 )    Color: Yellow / Appearance: Clear / S.020 / pH: x  Gluc: x / Ketone: Negative  / Bili: Negative / Urobili: 4 mg/dL   Blood: x / Protein: 15 mg/dL / Nitrite: Positive   Leuk Esterase: Small / RBC: 0-2 /HPF / WBC 3-5   Sq Epi: x / Non Sq Epi: Few / Bacteria: Moderate      CAPILLARY BLOOD GLUCOSE              I&O's Summary      RADIOLOGY & ADDITIONAL TESTS:    Imaging Personally Reviewed:  [x ] YES  [ ] NO    Consultant(s) Notes Reviewed:  [x ] YES  [ ] NO    Care Discussed with Consultants/Other Providers [ x] YES  [ ] NO

## 2019-01-12 LAB
-  AMIKACIN: SIGNIFICANT CHANGE UP
-  AMOXICILLIN/CLAVULANIC ACID: SIGNIFICANT CHANGE UP
-  AMPICILLIN/SULBACTAM: SIGNIFICANT CHANGE UP
-  AMPICILLIN: SIGNIFICANT CHANGE UP
-  AZTREONAM: SIGNIFICANT CHANGE UP
-  CEFAZOLIN: SIGNIFICANT CHANGE UP
-  CEFEPIME: SIGNIFICANT CHANGE UP
-  CEFOXITIN: SIGNIFICANT CHANGE UP
-  CEFTRIAXONE: SIGNIFICANT CHANGE UP
-  CIPROFLOXACIN: SIGNIFICANT CHANGE UP
-  ERTAPENEM: SIGNIFICANT CHANGE UP
-  GENTAMICIN: SIGNIFICANT CHANGE UP
-  IMIPENEM: SIGNIFICANT CHANGE UP
-  LEVOFLOXACIN: SIGNIFICANT CHANGE UP
-  MEROPENEM: SIGNIFICANT CHANGE UP
-  NITROFURANTOIN: SIGNIFICANT CHANGE UP
-  PIPERACILLIN/TAZOBACTAM: SIGNIFICANT CHANGE UP
-  TIGECYCLINE: SIGNIFICANT CHANGE UP
-  TOBRAMYCIN: SIGNIFICANT CHANGE UP
-  TRIMETHOPRIM/SULFAMETHOXAZOLE: SIGNIFICANT CHANGE UP
24R-OH-CALCIDIOL SERPL-MCNC: 28.6 NG/ML — LOW (ref 30–80)
CULTURE RESULTS: SIGNIFICANT CHANGE UP
IRON SATN MFR SERPL: 16 % — SIGNIFICANT CHANGE UP (ref 14–50)
IRON SATN MFR SERPL: 43 UG/DL — SIGNIFICANT CHANGE UP (ref 30–160)
METHOD TYPE: SIGNIFICANT CHANGE UP
ORGANISM # SPEC MICROSCOPIC CNT: SIGNIFICANT CHANGE UP
ORGANISM # SPEC MICROSCOPIC CNT: SIGNIFICANT CHANGE UP
SPECIMEN SOURCE: SIGNIFICANT CHANGE UP
T PALLIDUM AB TITR SER: NEGATIVE — SIGNIFICANT CHANGE UP
TIBC SERPL-MCNC: 265 UG/DL — SIGNIFICANT CHANGE UP (ref 220–430)
UIBC SERPL-MCNC: 222 UG/DL — SIGNIFICANT CHANGE UP (ref 110–370)

## 2019-01-12 PROCEDURE — 99233 SBSQ HOSP IP/OBS HIGH 50: CPT

## 2019-01-12 PROCEDURE — 99232 SBSQ HOSP IP/OBS MODERATE 35: CPT

## 2019-01-12 PROCEDURE — 99223 1ST HOSP IP/OBS HIGH 75: CPT

## 2019-01-12 RX ORDER — DIAZEPAM 5 MG
5 TABLET ORAL AT BEDTIME
Qty: 0 | Refills: 0 | Status: DISCONTINUED | OUTPATIENT
Start: 2019-01-12 | End: 2019-01-13

## 2019-01-12 RX ADMIN — Medication 1 TABLET(S): at 08:30

## 2019-01-12 RX ADMIN — Medication 5 MILLILITER(S): at 18:10

## 2019-01-12 RX ADMIN — Medication 5 MILLIGRAM(S): at 21:34

## 2019-01-12 RX ADMIN — RIVAROXABAN 20 MILLIGRAM(S): KIT at 18:05

## 2019-01-12 RX ADMIN — ESCITALOPRAM OXALATE 10 MILLIGRAM(S): 10 TABLET, FILM COATED ORAL at 13:23

## 2019-01-12 RX ADMIN — QUETIAPINE FUMARATE 25 MILLIGRAM(S): 200 TABLET, FILM COATED ORAL at 21:34

## 2019-01-12 RX ADMIN — Medication 81 MILLIGRAM(S): at 13:25

## 2019-01-12 RX ADMIN — Medication 1 TABLET(S): at 18:05

## 2019-01-12 NOTE — PROGRESS NOTE ADULT - SUBJECTIVE AND OBJECTIVE BOX
Patient is a 58y old  Female who presents with a chief complaint of pychosis (2019 17:42)      INTERVAL History of Present Illness/OVERNIGHT EVENTS: no new issues.  1:1 for safety    spoke to  in detail - no active 'medical' issues.  await possible transfer to     MEDICATIONS  (STANDING):  aspirin  chewable 81 milliGRAM(s) Oral daily  diazepam    Tablet 10 milliGRAM(s) Oral at bedtime  escitalopram 10 milliGRAM(s) Oral daily  lactobacillus acidophilus 1 Tablet(s) Oral two times a day with meals  melatonin 5 milliGRAM(s) Oral at bedtime  QUEtiapine 25 milliGRAM(s) Oral at bedtime  rivaroxaban 20 milliGRAM(s) Oral every 24 hours    MEDICATIONS  (PRN):  diphenhydrAMINE 25 milliGRAM(s) Oral at bedtime PRN Rash and/or Itching  LORazepam     Tablet 1 milliGRAM(s) Oral every 6 hours PRN Axniety and agitation      Allergies    sulfa drugs (Unknown)  sulfamethoxazole (Other)    Intolerances        REVIEW OF SYSTEMS:  Negative unless otherwise specified above.    Vital Signs Last 24 Hrs  T(C): 37.2 (2019 07:00), Max: 37.2 (2019 07:00)  T(F): 98.9 (2019 07:00), Max: 98.9 (2019 07:00)  HR: 67 (2019 07:00) (67 - 89)  BP: 143/90 (2019 07:00) (143/90 - 147/79)  BP(mean): --  RR: 18 (2019 07:00) (16 - 18)  SpO2: 96% (2019 07:00) (96% - 97%)        PHYSICAL EXAM:  GENERAL: No apparent distress, sleeping  HEAD:  Atraumatic, Normocephalic  EYES: conjunctiva and sclera clear  ENMT: Moist mucous membranes  NECK: Supple  CHEST/LUNG: Clear to auscultation bilaterally  HEART: Regular rate and rhythm  ABDOMEN: Soft, Nontender, Nondistended; Bowel sounds present  EXTREMITIES:  No clubbing, cyanosis or edema  SKIN: excoriations  NERVOUS SYSTEM:  Arousable but prefers to sleep      LABS:      Ca    8.9        2019 07:55        Urinalysis Basic - ( 10 Jorge 2019 14:03 )    Color: Yellow / Appearance: Clear / S.020 / pH: x  Gluc: x / Ketone: Negative  / Bili: Negative / Urobili: 4 mg/dL   Blood: x / Protein: 15 mg/dL / Nitrite: Positive   Leuk Esterase: Small / RBC: 0-2 /HPF / WBC 3-5   Sq Epi: x / Non Sq Epi: Few / Bacteria: Moderate      CAPILLARY BLOOD GLUCOSE          RADIOLOGY & ADDITIONAL TESTS:    Images reviewed personally    Consultant Notes Reviewed and Care Discussed with relevant Consultants/Other Providers.

## 2019-01-12 NOTE — PROGRESS NOTE BEHAVIORAL HEALTH - NSBHFUPINTERVALHXFT_PSY_A_CORE
Looks to be oversedated; Patient took all medications as per task manager. Patient dozing off in bed - awakens for a short period of time and is calm, cooperative said things are going "very good" when asked. She recalled eating an omelette for breakfast (confirmed by staff). She had no complaints and proceeded to fall back asleep and would not awaken again for evaluation to proceed. As per CO 1:1 staff, she did eat breakfast, at times bits her arm over scab areas but has not been physically aggressive with anyone.

## 2019-01-12 NOTE — CONSULT NOTE ADULT - ASSESSMENT
58year old with complex bariatric surgical history.  Ultimately having undergone Esophagojejunostomy.  Follows with Dr Brady at [facility].  Admitted with acute psychosis, awaiting bed on 1N.  Continues on 1:1    Blood work has already been collected to assess for nutritional deficiencies.  Pt and  states compliance with daily MVI supplementation.  Outpt record indicates normal values as of August 2108.    Tolerating diet and without clinical signs of malnutrition.  Weight stable.  Eats well.  Denies vomiting, diarrhea or dumping syndrome.    Recommend BID MVI supplementation as absorption of oral medication may be altered given [facility] esophago jejunostomy. 58year old with complex bariatric surgical history.  Ultimately having undergone Esophagojejunostomy.  Follows with Dr Brady at [facility].  Admitted with acute psychosis, awaiting bed on 1N.  Continues on 1:1    Blood work has already been collected to assess for nutritional deficiencies.  Pt and  states compliance with daily MVI supplementation.  Outpt record indicates normal values as of August 2108.    Tolerating diet and without clinical signs of malnutrition.  Weight stable.  Eats well.  Denies vomiting, diarrhea or dumping syndrome.    Daily MVI supplementation ordered.  Spoke with pharmacy.  Will provide liquid formulation which may improve absorption.

## 2019-01-12 NOTE — DIETITIAN INITIAL EVALUATION ADULT. - OTHER INFO
Pt assessed as per policy: hx gastric bypass. Pt c hx of bariatric surgery with complication leading to a total gastrectomy, as well as diverticulitis, multiple DVTs, intestinal fistula, obesity, HTN, and a recent unspecified CVA, who now presents with ongoing and progressively declining functional behavior and disorientation, of unknown origin.  Pt on 1:1 observation for suicidal idealation. Pt only answers RD's questions with "I'm okay" or "Im fine."  She is in bed and appears guarded and not making eye contact. Per nursing staff, pt has been tolerating regular diet without trouble. Pt assessed as per policy: hx gastric bypass. Pt c hx of bariatric surgery with complication leading to a total gastrectomy, as well as diverticulitis, multiple DVTs, intestinal fistula, obesity, HTN, and a recent unspecified CVA, who now presents with ongoing and progressively declining functional behavior and disorientation, of unknown origin.  Pt on 1:1 observation for suicidal idealation. Pt only answers RD's questions with "I'm okay" or "Im fine."  She is in bed and appears guarded and not making eye contact. Per nursing staff, pt has been tolerating regular diet without trouble. Per MD note, may be transferred to [address] [place] [address]. Pt assessed as per policy: hx gastric bypass. Pt c hx of bariatric surgery with complication leading to a total gastrectomy, as well as diverticulitis, multiple DVTs, intestinal fistula, obesity, HTN, and a recent unspecified CVA, who now presents with ongoing and progressively declining functional behavior and disorientation, of unknown origin.  Pt on 1:1 observation for suicidal idealation. Pt only answers RD's questions with "I'm okay" or "Im fine."  She is in bed and appears guarded and not making eye contact. Per nursing staff, pt has been tolerating regular diet without trouble.  Per Food and Nutrition staff, pt often calls office requesting  several food items/multiple quantities of foods (5 bags of Sunchips, 3 muffins etc.) Pt appears to be preoccupied with food and likely has some form of an eating disorder. At this point seems to be medically stable. Psychiatry following. Per MD note, may be transferred to [address] [place] [address].

## 2019-01-12 NOTE — PROGRESS NOTE BEHAVIORAL HEALTH - OTHER
deferred at this time "ok" sleepy this morning appeared linear during the sort period of engaging this morning unable to ascertain at this time due to limited exam as Patient fell back asleep maintains with effort

## 2019-01-12 NOTE — PROGRESS NOTE BEHAVIORAL HEALTH - AXIS III
istory of bariatric surgery with complication leading to a total gastrectomy, as well as diverticulitis, multiple DVTs, intestinal fistula, obesity, HTN, and a recent unspecified CVA,

## 2019-01-12 NOTE — CONSULT NOTE ADULT - SUBJECTIVE AND OBJECTIVE BOX
.
HPI:  58 y  old with PMH of gastrectom,y intestinal fistulas diverticulitis, bariatric surgery multiple DVT,   anxiety, insomnia, brought to ED by  with CC of erratic behavior, disheveled, and suicide  ideation. No fever chills n/v/d. pt denies any abdominal pain or urinary symptoms. Seen by Pysch  and neurology. Being worked up for psych issues.      Infectious DIsease consult was called to evaluate pt for possible UTI.    Past Medical & Surgical Hx:  PAST MEDICAL & SURGICAL HISTORY:  Fistula of intestine  Washed out  Reflux  Obesity  DVT (deep venous thrombosis):  neck  Diverticulitis  Hypertension  Elective surgery: abodominal wall surgery  dec 2016  Elective surgery: 2016 gastric bypass revision  Gastric bypass status for obesity: gastric sleeve, 2012  S/P breast biopsy: , left  S/P colon resection:   S/P knee surgery: repair ,   right; left  Umbilical hernia:   S/P appendectomy:   S/P tonsillectomy:       Social History--  EtOH: denies *  Tobacco: denies   Drug Use: denies   Teacher  Loves with       FAMILY HISTORY:  No pertinent family history in first degree relatives    Allergies  sulfa drugs (Unknown)    Home Medications:  aspirin 81 mg oral tablet, chewable: 1 tab(s) orally once a day (10 Jorge 2019 20:50)  Benadryl 25 mg oral capsule: orally once a day (at bedtime) (10 Jorge 2019 20:50)  biotin 1000 mcg oral tablet: 1 tab(s) orally once a day (10 Jorge 2019 20:50)  escitalopram 20 mg oral tablet: 1 tab(s) orally once a day (10 Jorge 2019 20:50)  folic acid 0.8 mg oral tablet: 1 tab(s) orally once a day (10 Jorge 2019 20:50)  Melatonin 5 mg oral tablet: tab(s) orally once a day (at bedtime) (10 Jorge 2019 20:50)  Multi Vitamin+: orally once a day (10 Jorge 2019 20:50)  tolterodine 4 mg oral capsule, extended release: 1 cap(s) orally once a day (10 Jorge 2019 20:50)  Vitamin B-12 250 mcg oral tablet: 1 tab(s) orally once a day (10 Jorge 2019 20:50)  Vitamin D3 2000 intl units oral capsule: 1 cap(s) orally once a day (10 Jorge 2019 20:50)  Xarelto 20 mg oral tablet: 1 tab(s) orally once a day (in the evening) (10 Jorge 2019 20:50)      Current Inpatient Medications :    ANTIBIOTICS:   cefTRIAXone   IVPB 1 Gram(s) IV Intermittent every 24 hours      OTHER RELEVANT MEDICATIONS :  aspirin  chewable 81 milliGRAM(s) Oral daily  diphenhydrAMINE 25 milliGRAM(s) Oral at bedtime PRN  escitalopram 20 milliGRAM(s) Oral daily  melatonin 5 milliGRAM(s) Oral at bedtime  rivaroxaban 20 milliGRAM(s) Oral every 24 hours      ROS:  CONSTITUTIONAL:  Negative fever or chills, feels well, good appetite  EYES:  Negative  blurry vision or double vision  CARDIOVASCULAR:  Negative for chest pain or palpitations  RESPIRATORY:  Negative for cough, wheezing, or SOB   GASTROINTESTINAL:  Negative for nausea, vomiting, diarrhea, constipation, or abdominal pain  GENITOURINARY:  Negative frequency, urgency , dysuria or hematuria   NEUROLOGIC:  No headache, confusion, dizziness, lightheadedness  All other systems were reviewed and are negative      Physical Exam:  Vital Signs Last 24 Hrs  T(C): 36.9 (2019 16:07), Max: 37.1 (10 Jorge 2019 22:26)  T(F): 98.5 (2019 16:07), Max: 98.7 (10 Jorge 2019 22:26)  HR: 89 (2019 16:07) (70 - 89)  BP: 147/79 (2019 16:07) (139/87 - 152/90)  BP(mean): --  RR: 18 (2019 16:07) (16 - 18)  SpO2: 97% (2019 16:07) (96% - 98%)      General:  in no acute distress  Eyes: sclera anicteric, pupils equal and reactive to light  ENMT: buccal mucosa moist, pharynx not injected  Neck: supple, trachea midline  Lungs: clear, no wheeze/rhonchi  Cardiovascular: regular rate and rhythm, S1 S2  Abdomen: soft, nontender, no organomegaly present, bowel sounds normal  Neurological:  alert and oriented x3, Cranial Nerves II-XII grossly intact  Skin:no increased ecchymosis/petechiae/purpura  Lymph Nodes: no palpable cervical/supraclavicular lymph nodes enlargements  Extremities: no cyanosis/clubbing/edema    Labs:                        11.1   6.12  )-----------( 206      ( 2019 07:55 )             34.9   -11    143  |  108  |  25<H>  ----------------------------<  91  4.1   |  27  |  0.83    Ca    8.9      2019 07:55    TPro  6.8  /  Alb  3.2<L>  /  TBili  0.6  /  DBili  x   /  AST  17  /  ALT  21  /  AlkPhos  118  01-10     Urinalysis Basic - ( 10 Jorge 2019 14:03 )    Color: Yellow / Appearance: Clear / S.020 / pH: x  Gluc: x / Ketone: Negative  / Bili: Negative / Urobili: 4 mg/dL   Blood: x / Protein: 15 mg/dL / Nitrite: Positive   Leuk Esterase: Small / RBC: 0-2 /HPF / WBC 3-5   Sq Epi: x / Non Sq Epi: Few / Bacteria: Moderate    RECENT CULTURES:  PENDING    RADIOLOGY & ADDITIONAL STUDIES:  EXAM:  CT BRAIN                            PROCEDURE DATE:  01/10/2019          INTERPRETATION:  HISTORY: Altered mental status    Comparison 14    There is interval development of mild dense cytotoxic edema versus early   encephalomalacia involving the dorsal medial left cerebellar hemisphere.   There is no hemorrhage or mass effect. There is no similar cortical edema   elsewhere. There is mild underlying chronic microvascular ischemic   change. There is no skull fracture.    IMPRESSION:    Small dense subacute to chronic left cerebellar infarct, new since .   No hemorrhage. MRI may be warranted      Assessment :   58 y  old with PMH of gastrectom,y intestinal fistulas diverticulitis, bariatric surgery multiple DVT,   anxiety, insomnia, brought to ED by  with CC of erratic behavior, disheveled, and suicide  ideation.   Ua unremarkable  Dount infectious process    Plan :   Dc Rocephin  Monitor off antibiotics  Fu cultures    Continue with present regime .  Appropriate use of antibiotics and adverse effects reviewed.      I have discussed the above plan of care with patient/family in detail. They expressed understanding of the treatment plan . Risks, benefits and alternatives discussed in detail. I have asked if they have any questions or concerns and appropriately addressed them to the best of my ability .      > 45 minutes spent in direct patient care reviewing  the notes, lab data/ imaging , discussion with multidisciplinary team. All questions were addressed and answered to the best of my capacity .    Thank you for allowing me to participate in the care of your patient .      Maya Burt MD  Infectious Disease  [phone]
HPI: 58 year old woman with complicated bariatric history including Sleeve gastrectomy complicated by leak, stricture, fistulas, converted to RYGB and then to Esophagojejunostomy.  Admitted 1/10 with acute psychosis.  As per , change in mentation, manic, flights of ideas and had left the house and feared to be a danger to herself.    Presently on 1:1 observation awaiting placement on 1N.    PAST MEDICAL & SURGICAL HISTORY:  Fistula of intestine  Washed out  Reflux  Obesity  DVT (deep venous thrombosis): 2013 neck  Diverticulitis  Hypertension  Elective surgery: abodominal wall surgery  dec 2016  Elective surgery: NOV 2016 gastric bypass revision  Gastric bypass status for obesity: gastric sleeve, 6/2012  S/P breast biopsy: 2011, left  S/P colon resection: 2011  S/P knee surgery: repair 2009, 2011  right; left  Umbilical hernia: 2000  S/P appendectomy: 1975  S/P tonsillectomy: 1967      MEDICATIONS  (STANDING):  aspirin  chewable 81 milliGRAM(s) Oral daily  diazepam    Tablet 5 milliGRAM(s) Oral at bedtime  escitalopram 10 milliGRAM(s) Oral daily  lactobacillus acidophilus 1 Tablet(s) Oral two times a day with meals  melatonin 5 milliGRAM(s) Oral at bedtime  QUEtiapine 25 milliGRAM(s) Oral at bedtime  rivaroxaban 20 milliGRAM(s) Oral every 24 hours    MEDICATIONS  (PRN):  diphenhydrAMINE 25 milliGRAM(s) Oral at bedtime PRN Rash and/or Itching  LORazepam     Tablet 1 milliGRAM(s) Oral every 6 hours PRN Axniety and agitation      Allergies    sulfa drugs (Unknown)  sulfamethoxazole (Other)    Intolerances        SOCIAL HISTORY:    FAMILY HISTORY:  No pertinent family history in first degree relatives      Vital Signs Last 24 Hrs  T(C): 37.2 (12 Jan 2019 07:00), Max: 37.2 (12 Jan 2019 07:00)  T(F): 98.9 (12 Jan 2019 07:00), Max: 98.9 (12 Jan 2019 07:00)  HR: 67 (12 Jan 2019 07:00) (67 - 85)  BP: 143/90 (12 Jan 2019 07:00) (143/90 - 145/80)  BP(mean): --  RR: 18 (12 Jan 2019 07:00) (16 - 18)  SpO2: 96% (12 Jan 2019 07:00) (96% - 97%)    LABS:                        11.1   6.12  )-----------( 206      ( 11 Jan 2019 07:55 )             34.9     01-11    143  |  108  |  25<H>  ----------------------------<  91  4.1   |  27  |  0.83    Ca    8.9      11 Jan 2019 07:55            RADIOLOGY & ADDITIONAL STUDIES:

## 2019-01-12 NOTE — PROGRESS NOTE ADULT - SUBJECTIVE AND OBJECTIVE BOX
AISSATOU DAVIS is a 58yFemale , patient examined and chart reviewed.     INTERVAL HPI/ OVERNIGHT EVENTS:   Afebrile. No diustress  Remains on 1:1 observation.    PAST MEDICAL & SURGICAL HISTORY:  Fistula of intestine  Washed out  Reflux  Obesity  DVT (deep venous thrombosis): 2013 neck  Diverticulitis  Hypertension  Elective surgery: abodominal wall surgery  dec 2016  Elective surgery: NOV 2016 gastric bypass revision  Gastric bypass status for obesity: gastric sleeve, 6/2012  S/P breast biopsy: 2011, left  S/P colon resection: 2011  S/P knee surgery: repair 2009, 2011  right; left  Umbilical hernia: 2000  S/P appendectomy: 1975  S/P tonsillectomy: 1967      For details regarding the patient's social history, family history, and other miscellaneous elements, please refer the initial infectious diseases consultation and/or the admitting history and physical examination for this admission.      ROS:  CONSTITUTIONAL:  Negative fever or chills,  EYES:  Negative  blurry vision or double vision  CARDIOVASCULAR:  Negative for chest pain or palpitations  RESPIRATORY:  Negative for cough, wheezing, or SOB   GASTROINTESTINAL:  Negative for nausea, vomiting, diarrhea, constipation, or abdominal pain  GENITOURINARY:  Negative frequency, urgency or dysuria  NEUROLOGIC:  No headache, confusion, dizziness, lightheadedness  All other systems were reviewed and are negative     ALLERGIES:  sulfa drugs (Unknown)  sulfamethoxazole (Other)      Current inpatient medications :    ANTIBIOTICS/RELEVANT:  lactobacillus acidophilus 1 Tablet(s) Oral two times a day with meals      aspirin  chewable 81 milliGRAM(s) Oral daily  diazepam    Tablet 5 milliGRAM(s) Oral at bedtime  diphenhydrAMINE 25 milliGRAM(s) Oral at bedtime PRN  escitalopram 10 milliGRAM(s) Oral daily  LORazepam     Tablet 1 milliGRAM(s) Oral every 6 hours PRN  melatonin 5 milliGRAM(s) Oral at bedtime  multivitamin   Solution 5 milliLiter(s) Oral daily  QUEtiapine 25 milliGRAM(s) Oral at bedtime  rivaroxaban 20 milliGRAM(s) Oral every 24 hours      Objective:    T(C): 37.2 (01-12-19 @ 15:14), Max: 37.2 (01-12-19 @ 07:00)  HR: 73 (01-12-19 @ 15:14) (67 - 73)  BP: 125/81 (01-12-19 @ 15:14) (125/81 - 143/90)  RR: 16 (01-12-19 @ 15:14) (16 - 18)  SpO2: 96% (01-12-19 @ 15:14) (96% - 96%)  Wt(kg): --      Physical Exam:  General:  no acute distress  Eyes: sclera anicteric, pupils equal and reactive to light  ENMT: buccal mucosa moist, pharynx not injected  Neck: supple, trachea midline  Lungs: clear, no wheeze/rhonchi  Cardiovascular: regular rate and rhythm, S1 S2  Abdomen: soft, nontender, no organomegaly present, bowel sounds normal  Neurological: alert and oriented x3, Cranial Nerves II-XII grossly intact  Skin: no increased ecchymosis/petechiae/purpura  Lymph Nodes: no palpable cervical/supraclavicular lymph nodes enlargements  Extremities: no cyanosis/clubbing/edema    LABS:                          11.1   6.12  )-----------( 206      ( 11 Jan 2019 07:55 )             34.9       01-11    143  |  108  |  25<H>  ----------------------------<  91  4.1   |  27  |  0.83    Ca    8.9      11 Jan 2019 07:55      MICROBIOLOGY:    Culture - Urine (collected 10 Jorge 2019 21:43)  Source: .Urine Catheterized  Final Report (12 Jan 2019 18:10):    >100,000 CFU/ml Escherichia coli  Organism: Escherichia coli (12 Jan 2019 18:10)  Organism: Escherichia coli (12 Jan 2019 18:10)      -  Amikacin: S <=8      -  Amoxicillin/Clavulanic Acid: S <=8/4      -  Ampicillin: S <=2 These ampicillin results predict results for amoxicillin      -  Ampicillin/Sulbactam: S <=4/2      -  Aztreonam: S <=4      -  Cefazolin: S <=2 For uncomplicated UTI with K. pneumoniae, E. coli, or P. mirablis: ALISHA <=16 is sensitive and ALISHA >=32 is resistant. This also predicts results for oral agents cefaclor, cefdinir, cefpodoxime, cefprozil, cefuroxime axetil, cephalexin and locarbef for uncomplicated UTI. Note that some isolates may be susceptible to these agents while testing resistant to cefazolin.      -  Cefepime: S <=2      -  Cefoxitin: S <=4      -  Ceftriaxone: S <=1 Enterobacter, Citrobacter, and Serratia may develop resistance during prolonged therapy      -  Ciprofloxacin: R >2      -  Ertapenem: S <=0.5      -  Gentamicin: S <=1      -  Imipenem: S <=1      -  Levofloxacin: R >4      -  Meropenem: S <=1      -  Nitrofurantoin: S <=32 Should not be used to treat pyelonephritis      -  Piperacillin/Tazobactam: S <=8      -  Tigecycline: S <=1      -  Tobramycin: S <=2      -  Trimethoprim/Sulfamethoxazole: S <=0.5/9.5      Method Type: ALISHA    RADIOLOGY & ADDITIONAL STUDIES:  EXAM:  CT BRAIN                            PROCEDURE DATE:  01/10/2019          INTERPRETATION:  HISTORY: Altered mental status    Comparison 04/17/14    There is interval development of mild dense cytotoxic edema versus early   encephalomalacia involving the dorsal medial left cerebellar hemisphere.   There is no hemorrhage or mass effect. There is no similar cortical edema   elsewhere. There is mild underlying chronic microvascular ischemic   change. There is no skull fracture.    IMPRESSION:    Small dense subacute to chronic left cerebellar infarct, new since 2014.   No hemorrhage. MRI may be warranted      Assessment :   58 y  old with PMH of gastrectom,y intestinal fistulas diverticulitis, bariatric surgery multiple DVT,   anxiety, insomnia, brought to ED by  with CC of erratic behavior, disheveled, and suicide  ideation.   Asymptomatic Bacteruria Ua unremarkable  Dount infectious process    Plan :   Monitor off antibiotics  Stable from Id standpoint    Continue with present regiment.  Appropriate use of antibiotics and adverse effects reviewed.      I have discussed the above plan of care with patient/ family in detail. They expressed understanding of the  treatment plan . Risks, benefits and alternatives discussed in detail. I have asked if they have any questions or concerns and appropriately addressed them to the best of my ability .    > 35 minutes were spent in direct patient care reviewing notes, medications ,labs data/ imaging , discussion with multidisciplinary team.    Thank you for allowing me to participate in care of your patient .    Maya Burt MD  Infectious Disease  [phone]

## 2019-01-13 LAB
FOLATE SERPL-MCNC: >20 NG/ML — SIGNIFICANT CHANGE UP
T4 FREE SERPL-MCNC: 1 NG/DL — SIGNIFICANT CHANGE UP (ref 0.9–1.8)
TSH SERPL-MCNC: 1.03 UIU/ML — SIGNIFICANT CHANGE UP (ref 0.27–4.2)
VIT B12 SERPL-MCNC: 566 PG/ML — SIGNIFICANT CHANGE UP (ref 232–1245)

## 2019-01-13 PROCEDURE — 99233 SBSQ HOSP IP/OBS HIGH 50: CPT

## 2019-01-13 PROCEDURE — 99232 SBSQ HOSP IP/OBS MODERATE 35: CPT

## 2019-01-13 RX ORDER — DIAZEPAM 5 MG
2 TABLET ORAL AT BEDTIME
Qty: 0 | Refills: 0 | Status: DISCONTINUED | OUTPATIENT
Start: 2019-01-13 | End: 2019-01-15

## 2019-01-13 RX ORDER — ESCITALOPRAM OXALATE 10 MG/1
5 TABLET, FILM COATED ORAL DAILY
Qty: 0 | Refills: 0 | Status: COMPLETED | OUTPATIENT
Start: 2019-01-13 | End: 2019-01-15

## 2019-01-13 RX ADMIN — RIVAROXABAN 20 MILLIGRAM(S): KIT at 17:54

## 2019-01-13 RX ADMIN — ESCITALOPRAM OXALATE 5 MILLIGRAM(S): 10 TABLET, FILM COATED ORAL at 12:17

## 2019-01-13 RX ADMIN — QUETIAPINE FUMARATE 25 MILLIGRAM(S): 200 TABLET, FILM COATED ORAL at 21:09

## 2019-01-13 RX ADMIN — Medication 81 MILLIGRAM(S): at 12:18

## 2019-01-13 RX ADMIN — Medication 1 TABLET(S): at 09:06

## 2019-01-13 RX ADMIN — Medication 2 MILLIGRAM(S): at 21:09

## 2019-01-13 RX ADMIN — Medication 5 MILLILITER(S): at 12:18

## 2019-01-13 RX ADMIN — Medication 1 TABLET(S): at 17:54

## 2019-01-13 RX ADMIN — Medication 0.5 MILLIGRAM(S): at 20:11

## 2019-01-13 RX ADMIN — Medication 1 MILLIGRAM(S): at 12:25

## 2019-01-13 RX ADMIN — Medication 5 MILLIGRAM(S): at 21:09

## 2019-01-13 RX ADMIN — Medication 1 MILLIGRAM(S): at 17:57

## 2019-01-13 NOTE — PROGRESS NOTE ADULT - SUBJECTIVE AND OBJECTIVE BOX
Patient is a 58y old  Female who presents with a chief complaint of Acute psychosis (12 Jan 2019 17:45)      INTERVAL History of Present Illness/OVERNIGHT EVENTS: no new issues.  per 1:1 at bedside, patient ate.  reports no new problems - feels well.  poor eye contact    MEDICATIONS  (STANDING):  aspirin  chewable 81 milliGRAM(s) Oral daily  diazepam    Tablet 2 milliGRAM(s) Oral at bedtime  escitalopram 5 milliGRAM(s) Oral daily  lactobacillus acidophilus 1 Tablet(s) Oral two times a day with meals  melatonin 5 milliGRAM(s) Oral at bedtime  multivitamin   Solution 5 milliLiter(s) Oral daily  QUEtiapine 25 milliGRAM(s) Oral at bedtime  rivaroxaban 20 milliGRAM(s) Oral every 24 hours    MEDICATIONS  (PRN):  diphenhydrAMINE 25 milliGRAM(s) Oral at bedtime PRN Rash and/or Itching  LORazepam     Tablet 1 milliGRAM(s) Oral every 6 hours PRN Axniety and agitation      Allergies    sulfa drugs (Unknown)  sulfamethoxazole (Other)    Intolerances        REVIEW OF SYSTEMS:  Negative unless otherwise specified above.    Vital Signs Last 24 Hrs  T(C): 36.7 (13 Jan 2019 07:30), Max: 37.2 (12 Jan 2019 15:14)  T(F): 98.1 (13 Jan 2019 07:30), Max: 99 (12 Jan 2019 15:14)  HR: 57 (13 Jan 2019 07:30) (57 - 73)  BP: 155/89 (13 Jan 2019 07:30) (125/81 - 155/89)  BP(mean): --  RR: 16 (13 Jan 2019 07:30) (16 - 16)  SpO2: 96% (13 Jan 2019 07:30) (95% - 96%)        PHYSICAL EXAM:  GENERAL: No apparent distress  HEAD:  Atraumatic, Normocephalic  EYES: conjunctiva and sclera clear  ENMT: Moist mucous membranes  NECK: Supple  CHEST/LUNG: Clear to auscultation bilaterally  HEART: Regular rate and rhythm  ABDOMEN: Soft, Nontender, Nondistended; Bowel sounds present  EXTREMITIES:  No clubbing, cyanosis or edema  SKIN: No rashes or lesions  NERVOUS SYSTEM:  Alert & Oriented X3; Bilateral Lower extremity mobile, sensation to light touch intact      LABS:              CAPILLARY BLOOD GLUCOSE          RADIOLOGY & ADDITIONAL TESTS:    Images reviewed personally    Consultant Notes Reviewed and Care Discussed with relevant Consultants/Other Providers.

## 2019-01-13 NOTE — PROGRESS NOTE BEHAVIORAL HEALTH - AXIS III
istory of bariatric surgery with complication leading to a total gastrectomy, as well as diverticulitis, multiple DVTs, intestinal fistula, obesity, HTN, and a recent unspecified CVA, Sleeve gastrectomy complicated by leak, stricture, fistulas, converted to RYGB and then to Esophagojejunostomy; diverticulitis, multiple DVTs, intestinal fistula, obesity, HTN, and a recent unspecified CVA,

## 2019-01-13 NOTE — PROGRESS NOTE BEHAVIORAL HEALTH - OTHER
deferred at this time n/a - sleeping sleepy this morning unable to ascertain at this time due to limited exam as Patient sleeping unable to ascertain at this time due to limited exam as Patient fell back asleep

## 2019-01-13 NOTE — PROGRESS NOTE BEHAVIORAL HEALTH - NSBHFUPINTERVALHXFT_PSY_A_CORE
Same clinical presentation as yesterday - sedated, as per [facility] 1:1 staff Patient ate breakfast and has been sleeping all morning and was not able to participate in PT. No PRNs needed since last seen. No self-biting episodes in last 24 hours.

## 2019-01-14 PROCEDURE — 12345: CPT | Mod: NC

## 2019-01-14 PROCEDURE — 99232 SBSQ HOSP IP/OBS MODERATE 35: CPT

## 2019-01-14 RX ORDER — THIAMINE MONONITRATE (VIT B1) 100 MG
100 TABLET ORAL DAILY
Qty: 0 | Refills: 0 | Status: DISCONTINUED | OUTPATIENT
Start: 2019-01-14 | End: 2019-01-19

## 2019-01-14 RX ADMIN — Medication 2 MILLIGRAM(S): at 21:19

## 2019-01-14 RX ADMIN — Medication 1 TABLET(S): at 08:38

## 2019-01-14 RX ADMIN — Medication 1 MILLIGRAM(S): at 15:44

## 2019-01-14 RX ADMIN — Medication 5 MILLIGRAM(S): at 21:19

## 2019-01-14 RX ADMIN — RIVAROXABAN 20 MILLIGRAM(S): KIT at 18:17

## 2019-01-14 RX ADMIN — ESCITALOPRAM OXALATE 5 MILLIGRAM(S): 10 TABLET, FILM COATED ORAL at 13:11

## 2019-01-14 RX ADMIN — Medication 1 TABLET(S): at 18:16

## 2019-01-14 RX ADMIN — Medication 5 MILLILITER(S): at 13:11

## 2019-01-14 RX ADMIN — QUETIAPINE FUMARATE 25 MILLIGRAM(S): 200 TABLET, FILM COATED ORAL at 21:19

## 2019-01-14 RX ADMIN — Medication 100 MILLIGRAM(S): at 13:10

## 2019-01-14 RX ADMIN — Medication 81 MILLIGRAM(S): at 13:11

## 2019-01-14 NOTE — PROGRESS NOTE ADULT - SUBJECTIVE AND OBJECTIVE BOX
Patient is a 58y old  Female who presents with a chief complaint of psychosis (13 Jan 2019 14:48)      INTERVAL History of Present Illness/OVERNIGHT EVENTS: no new issues.  thiamine ordered by surgery PA    MEDICATIONS  (STANDING):  aspirin  chewable 81 milliGRAM(s) Oral daily  diazepam    Tablet 2 milliGRAM(s) Oral at bedtime  escitalopram 5 milliGRAM(s) Oral daily  lactobacillus acidophilus 1 Tablet(s) Oral two times a day with meals  melatonin 5 milliGRAM(s) Oral at bedtime  multivitamin   Solution 5 milliLiter(s) Oral daily  QUEtiapine 25 milliGRAM(s) Oral at bedtime  rivaroxaban 20 milliGRAM(s) Oral every 24 hours  thiamine 100 milliGRAM(s) Oral daily    MEDICATIONS  (PRN):  diphenhydrAMINE 25 milliGRAM(s) Oral at bedtime PRN Rash and/or Itching  LORazepam     Tablet 1 milliGRAM(s) Oral every 6 hours PRN Axniety and agitation      Allergies    sulfa drugs (Unknown)  sulfamethoxazole (Other)    Intolerances        REVIEW OF SYSTEMS:  Negative unless otherwise specified above.    Vital Signs Last 24 Hrs  T(C): 36.8 (14 Jan 2019 08:06), Max: 36.9 (13 Jan 2019 23:25)  T(F): 98.2 (14 Jan 2019 08:06), Max: 98.4 (13 Jan 2019 23:25)  HR: 71 (14 Jan 2019 08:06) (69 - 94)  BP: 128/85 (14 Jan 2019 08:06) (114/80 - 128/85)  BP(mean): --  RR: 16 (14 Jan 2019 08:06) (15 - 18)  SpO2: 96% (14 Jan 2019 08:06) (95% - 98%)        PHYSICAL EXAM:  GENERAL: No apparent distress  HEAD:  Atraumatic, Normocephalic  EYES: conjunctiva and sclera clear  ENMT: Moist mucous membranes  NECK: Supple  CHEST/LUNG: Clear to auscultation bilaterally  HEART: Regular rate and rhythm  ABDOMEN: Soft, Nontender, Nondistended; Bowel sounds present  EXTREMITIES:  No clubbing, cyanosis or edema  SKIN: No rashes or lesions  NERVOUS SYSTEM:  Alert & Oriented X3; Bilateral Lower extremity mobile, sensation to light touch intact      LABS:              CAPILLARY BLOOD GLUCOSE          RADIOLOGY & ADDITIONAL TESTS:    Images reviewed personally    Consultant Notes Reviewed and Care Discussed with relevant Consultants/Other Providers.

## 2019-01-15 LAB
VIT A SERPL-MCNC: 46.8 UG/DL — SIGNIFICANT CHANGE UP (ref 20.1–62)
ZINC SERPL-MCNC: 57 UG/DL — SIGNIFICANT CHANGE UP (ref 56–134)

## 2019-01-15 PROCEDURE — 99232 SBSQ HOSP IP/OBS MODERATE 35: CPT

## 2019-01-15 PROCEDURE — 12345: CPT | Mod: NC

## 2019-01-15 RX ORDER — DIVALPROEX SODIUM 500 MG/1
250 TABLET, DELAYED RELEASE ORAL
Qty: 0 | Refills: 0 | Status: DISCONTINUED | OUTPATIENT
Start: 2019-01-15 | End: 2019-01-16

## 2019-01-15 RX ORDER — QUETIAPINE FUMARATE 200 MG/1
50 TABLET, FILM COATED ORAL AT BEDTIME
Qty: 0 | Refills: 0 | Status: DISCONTINUED | OUTPATIENT
Start: 2019-01-15 | End: 2019-01-16

## 2019-01-15 RX ADMIN — QUETIAPINE FUMARATE 50 MILLIGRAM(S): 200 TABLET, FILM COATED ORAL at 21:11

## 2019-01-15 RX ADMIN — Medication 81 MILLIGRAM(S): at 12:13

## 2019-01-15 RX ADMIN — Medication 100 MILLIGRAM(S): at 12:14

## 2019-01-15 RX ADMIN — Medication 1 MILLIGRAM(S): at 15:07

## 2019-01-15 RX ADMIN — DIVALPROEX SODIUM 250 MILLIGRAM(S): 500 TABLET, DELAYED RELEASE ORAL at 21:11

## 2019-01-15 RX ADMIN — Medication 1 TABLET(S): at 17:20

## 2019-01-15 RX ADMIN — Medication 5 MILLIGRAM(S): at 21:11

## 2019-01-15 RX ADMIN — Medication 5 MILLILITER(S): at 12:14

## 2019-01-15 RX ADMIN — Medication 1 TABLET(S): at 09:39

## 2019-01-15 RX ADMIN — RIVAROXABAN 20 MILLIGRAM(S): KIT at 17:21

## 2019-01-15 RX ADMIN — ESCITALOPRAM OXALATE 5 MILLIGRAM(S): 10 TABLET, FILM COATED ORAL at 12:13

## 2019-01-15 NOTE — PROGRESS NOTE BEHAVIORAL HEALTH - OTHER
Inappropriate at times unable to ascertain at this time due to limited exam as Patient sleeping unable to ascertain at this time due to limited exam as Patient fell back asleep

## 2019-01-15 NOTE — PROGRESS NOTE ADULT - SUBJECTIVE AND OBJECTIVE BOX
AISSATOU DAVIS is a 58yFemale , patient examined and chart reviewed.     INTERVAL HPI/ OVERNIGHT EVENTS:   Afebrile. No distress  Remains on 1:1 observation.  No events.    PAST MEDICAL & SURGICAL HISTORY:  Fistula of intestine  Washed out  Reflux  Obesity  DVT (deep venous thrombosis): 2013 neck  Diverticulitis  Hypertension  Elective surgery: abodominal wall surgery  dec 2016  Elective surgery: NOV 2016 gastric bypass revision  Gastric bypass status for obesity: gastric sleeve, 6/2012  S/P breast biopsy: 2011, left  S/P colon resection: 2011  S/P knee surgery: repair 2009, 2011  right; left  Umbilical hernia: 2000  S/P appendectomy: 1975  S/P tonsillectomy: 1967      For details regarding the patient's social history, family history, and other miscellaneous elements, please refer the initial infectious diseases consultation and/or the admitting history and physical examination for this admission.      ROS:  CONSTITUTIONAL:  Negative fever or chills,  EYES:  Negative  blurry vision or double vision  CARDIOVASCULAR:  Negative for chest pain or palpitations  RESPIRATORY:  Negative for cough, wheezing, or SOB   GASTROINTESTINAL:  Negative for nausea, vomiting, diarrhea, constipation, or abdominal pain  GENITOURINARY:  Negative frequency, urgency or dysuria  NEUROLOGIC:  No headache, confusion, dizziness, lightheadedness  All other systems were reviewed and are negative     ALLERGIES:  sulfa drugs (Unknown)  sulfamethoxazole (Other)      Current inpatient medications :    ANTIBIOTICS/RELEVANT:    MEDICATIONS  (STANDING):  aspirin  chewable 81 milliGRAM(s) Oral daily  diazepam    Tablet 2 milliGRAM(s) Oral at bedtime  lactobacillus acidophilus 1 Tablet(s) Oral two times a day with meals  melatonin 5 milliGRAM(s) Oral at bedtime  multivitamin   Solution 5 milliLiter(s) Oral daily  QUEtiapine 25 milliGRAM(s) Oral at bedtime  rivaroxaban 20 milliGRAM(s) Oral every 24 hours  thiamine 100 milliGRAM(s) Oral daily    MEDICATIONS  (PRN):  diphenhydrAMINE 25 milliGRAM(s) Oral at bedtime PRN Rash and/or Itching  LORazepam     Tablet 1 milliGRAM(s) Oral every 6 hours PRN Axniety and agitation      Objective:  Vital Signs Last 24 Hrs  T(C): 36.8 (15 Jorge 2019 07:30), Max: 37.1 (14 Jan 2019 19:11)  T(F): 98.3 (15 Jorge 2019 07:30), Max: 98.8 (14 Jan 2019 19:11)  HR: 50 (15 Jorge 2019 07:30) (50 - 69)  BP: 147/83 (15 Jorge 2019 07:30) (127/80 - 151/92)  BP(mean): --  RR: 18 (15 Jorge 2019 07:30) (16 - 18)  SpO2: 97% (15 Jorge 2019 07:30) (96% - 97%)      Physical Exam:  General:  no acute distress  Eyes: sclera anicteric, pupils equal and reactive to light  ENMT: buccal mucosa moist, pharynx not injected  Neck: supple, trachea midline  Lungs: clear, no wheeze/rhonchi  Cardiovascular: regular rate and rhythm, S1 S2  Abdomen: soft, nontender, no organomegaly present, bowel sounds normal  Neurological: alert and oriented x3, Cranial Nerves II-XII grossly intact  Skin: no increased ecchymosis/petechiae/purpura  Lymph Nodes: no palpable cervical/supraclavicular lymph nodes enlargements  Extremities: no cyanosis/clubbing/edema    LABS:  No labs today    Assessment :   58 y  old with PMH of gastrectom,y intestinal fistulas diverticulitis, bariatric surgery multiple DVT,   anxiety, insomnia, brought to ED by  with CC of erratic behavior, disheveled, and suicide  ideation.   Asymptomatic Bacteruria Ua unremarkable  Doubt infectious process    Plan :   Monitor off antibiotics  Stable from ID standpoint  No active ID issues   Will sign off   Please reconsult when needed    Continue with present regiment.  Appropriate use of antibiotics and adverse effects reviewed.      I have discussed the above plan of care with patient/ family in detail. They expressed understanding of the  treatment plan . Risks, benefits and alternatives discussed in detail. I have asked if they have any questions or concerns and appropriately addressed them to the best of my ability .    > 35 minutes were spent in direct patient care reviewing notes, medications ,labs data/ imaging , discussion with multidisciplinary team.    Thank you for allowing me to participate in care of your patient .    Maya Burt MD  Infectious Disease  [phone]

## 2019-01-15 NOTE — PROGRESS NOTE BEHAVIORAL HEALTH - NSBHFUPINTERVALHXFT_PSY_A_CORE
Patient seen, evaluated and chart reviewed. Patient is currently compliant with treatment, less manic than the other day. She continues to ask inappropriate questions and making inappropriate statements, but significantly less so. Valium was decreased due to excessive lethargy. No PRNs needed since last seen. No self-biting episodes in last 24 hours. She is currently calm and doing a cross-word puzzle.

## 2019-01-15 NOTE — PROGRESS NOTE ADULT - SUBJECTIVE AND OBJECTIVE BOX
BARIATRIC SURGERY       Surgeon: Jose STREETER    Subjective:    HPI: 58 year old woman with complicated bariatric history including Sleeve gastrectomy complicated by leak, stricture, fistulas, converted to RYGB and then to Esophagojejunostomy.Admitted 1/10 with acute psychosis.  As per , change in mentation, manic, flights of ideas and had left the house and feared to be a danger to herself. Pt is currently being seen by hospitalist. Discussed with MD - daily thiamine 100 mg po qd. Plan to be transferred from [facility] to [address] [place] [address]. Tolerating regular diet without any issues . Denies any GI symptoms. No nausea or vomiting . No change in BM. Denies any food intolerances. No diarrhea or constipation. Denies reflux symptoms.       LABS: Vitamin levels still pending.       Vital Signs Last 24 Hrs  T(C): 36.8 (15 Jorge 2019 07:30), Max: 37.1 (14 Jan 2019 19:11)  T(F): 98.3 (15 Jorge 2019 07:30), Max: 98.8 (14 Jan 2019 19:11)  HR: 50 (15 Jorge 2019 07:30) (50 - 69)  BP: 147/83 (15 Jorge 2019 07:30) (127/80 - 151/92)  BP(mean): --  RR: 18 (15 Jorge 2019 07:30) (16 - 18)  SpO2: 97% (15 Jorge 2019 07:30) (96% - 97%)    01-14 @ 07:01  -  01-15 @ 07:00  --------------------------------------------------------  IN: 80 mL / OUT: 0 mL / NET: 80 mL        Physical Exam:  General: Alert & Oriented x 3.  NAD, resting comfortably in bed.    Abdominal: Soft - Non tender - No swelling noted. Incision site clean / dry /intact. Normoactive Bowel Sounds.  Extremities: No swelling/ edema / erythema noted bilateral upper / lower extremities.  Neuro: Motor / Sensory function grossly intact bilateral lower/ upper extremities.          Assessment:57 yo F with history of complicated Bariatric Surgery. Admitted secondary acute psychosis./ Maniac episode.  Pt is hemodynamically stable.    Plan:  Dietician consult.   Cont regular diet as tolerated .  No surgical intervention needed at this time. Will discuss vitamin levels with patient when finalized.   Will continue care with hospitalist and Psych.- will complete discharge planning after transfer.   Plan to be transferred from [facility] to [address] [place] [address].

## 2019-01-15 NOTE — PROGRESS NOTE ADULT - SUBJECTIVE AND OBJECTIVE BOX
Patient is a 58y old  Female who presents with a chief complaint of Acute psychosis (15 Jorge 2019 11:06)    INTERVAL HPI/OVERNIGHT EVENTS: calm in bed, coloring on paper.  asking for more food like cheeseburgers and pizza.      MEDICATIONS  (STANDING):  aspirin  chewable 81 milliGRAM(s) Oral daily  diazepam    Tablet 2 milliGRAM(s) Oral at bedtime  lactobacillus acidophilus 1 Tablet(s) Oral two times a day with meals  melatonin 5 milliGRAM(s) Oral at bedtime  multivitamin   Solution 5 milliLiter(s) Oral daily  QUEtiapine 25 milliGRAM(s) Oral at bedtime  rivaroxaban 20 milliGRAM(s) Oral every 24 hours  thiamine 100 milliGRAM(s) Oral daily    MEDICATIONS  (PRN):  diphenhydrAMINE 25 milliGRAM(s) Oral at bedtime PRN Rash and/or Itching  LORazepam     Tablet 1 milliGRAM(s) Oral every 6 hours PRN Axniety and agitation      Allergies  sulfa drugs (Unknown)  sulfamethoxazole (Other)    Vital Signs Last 24 Hrs  T(C): 36.8 (15 Jorge 2019 07:30), Max: 37.1 (14 Jan 2019 19:11)  T(F): 98.3 (15 Jorge 2019 07:30), Max: 98.8 (14 Jan 2019 19:11)  HR: 50 (15 Jorge 2019 07:30) (50 - 69)  BP: 147/83 (15 Jorge 2019 07:30) (127/80 - 151/92)  BP(mean): --  RR: 18 (15 Jorge 2019 07:30) (16 - 18)  SpO2: 97% (15 Jorge 2019 07:30) (96% - 97%)    PHYSICAL EXAM:  GENERAL: NAD, well-groomed, well-developed  HEAD:  Atraumatic, Normocephalic  EYES:  conjunctiva and sclera clear  ENMT: Moist mucous membranes  NECK: Supple, No JVD  NERVOUS SYSTEM:  Awake and alert, moving all extremities.   CHEST/LUNG: Clear to auscultation bilaterally;   HEART: Regular rate and rhythm; No murmurs, rubs, or gallops  ABDOMEN: Soft, Nontender, Nondistended; Bowel sounds present  EXTREMITIES:  2+ Peripheral Pulses, No clubbing, cyanosis, or edema  LYMPH: No lymphadenopathy noted  SKIN: multiple bruises and abrasions on bilateral LE -healing.     LABS:    CAPILLARY BLOOD GLUCOSE          RADIOLOGY & ADDITIONAL TESTS:    Imaging Personally Reviewed:  [ ] YES     Consultant(s) Notes Reviewed:      Care Discussed with Consultants/Other Providers:    Advanced Directives: [ ] DNR  [ ] No feeding tube  [ ] MOLST in chart  [ ] MOLST completed today  [ ] Unknown

## 2019-01-15 NOTE — CHART NOTE - NSCHARTNOTEFT_GEN_A_CORE
Called to renew constant observation.  Since psych recommends constant observation in their note today, will continue order.  Patient to be re-assessed by psych and primary team regarding need for constant observation.

## 2019-01-15 NOTE — PROGRESS NOTE BEHAVIORAL HEALTH - AXIS III
Sleeve gastrectomy complicated by leak, stricture, fistulas, converted to RYGB and then to Esophagojejunostomy; diverticulitis, multiple DVTs, intestinal fistula, obesity, HTN, and a recent unspecified CVA,

## 2019-01-16 DIAGNOSIS — I82.409 ACUTE EMBOLISM AND THROMBOSIS OF UNSPECIFIED DEEP VEINS OF UNSPECIFIED LOWER EXTREMITY: ICD-10-CM

## 2019-01-16 DIAGNOSIS — Z98.84 BARIATRIC SURGERY STATUS: ICD-10-CM

## 2019-01-16 DIAGNOSIS — I82.A22 CHRONIC EMBOLISM AND THROMBOSIS OF LEFT AXILLARY VEIN: ICD-10-CM

## 2019-01-16 LAB
PYRIDOXAL PHOS SERPL-MCNC: 7.2 UG/L — SIGNIFICANT CHANGE UP (ref 2–32.8)
VIT B1 SERPL-MCNC: 107.7 NMOL/L — SIGNIFICANT CHANGE UP (ref 66.5–200)

## 2019-01-16 PROCEDURE — 99232 SBSQ HOSP IP/OBS MODERATE 35: CPT

## 2019-01-16 RX ORDER — TOPIRAMATE 25 MG
25 TABLET ORAL
Qty: 0 | Refills: 0 | Status: DISCONTINUED | OUTPATIENT
Start: 2019-01-16 | End: 2019-01-19

## 2019-01-16 RX ORDER — QUETIAPINE FUMARATE 200 MG/1
100 TABLET, FILM COATED ORAL AT BEDTIME
Qty: 0 | Refills: 0 | Status: DISCONTINUED | OUTPATIENT
Start: 2019-01-16 | End: 2019-01-18

## 2019-01-16 RX ORDER — OXYBUTYNIN CHLORIDE 5 MG
5 TABLET ORAL
Qty: 0 | Refills: 0 | Status: DISCONTINUED | OUTPATIENT
Start: 2019-01-16 | End: 2019-01-19

## 2019-01-16 RX ORDER — DIVALPROEX SODIUM 500 MG/1
500 TABLET, DELAYED RELEASE ORAL
Qty: 0 | Refills: 0 | Status: DISCONTINUED | OUTPATIENT
Start: 2019-01-16 | End: 2019-01-19

## 2019-01-16 RX ADMIN — DIVALPROEX SODIUM 500 MILLIGRAM(S): 500 TABLET, DELAYED RELEASE ORAL at 21:33

## 2019-01-16 RX ADMIN — Medication 100 MILLIGRAM(S): at 10:53

## 2019-01-16 RX ADMIN — Medication 1 TABLET(S): at 16:11

## 2019-01-16 RX ADMIN — RIVAROXABAN 20 MILLIGRAM(S): KIT at 16:10

## 2019-01-16 RX ADMIN — Medication 5 MILLILITER(S): at 10:54

## 2019-01-16 RX ADMIN — Medication 1 TABLET(S): at 08:51

## 2019-01-16 RX ADMIN — Medication 5 MILLIGRAM(S): at 21:33

## 2019-01-16 RX ADMIN — Medication 5 MILLIGRAM(S): at 16:10

## 2019-01-16 RX ADMIN — DIVALPROEX SODIUM 250 MILLIGRAM(S): 500 TABLET, DELAYED RELEASE ORAL at 16:10

## 2019-01-16 RX ADMIN — QUETIAPINE FUMARATE 100 MILLIGRAM(S): 200 TABLET, FILM COATED ORAL at 21:33

## 2019-01-16 RX ADMIN — Medication 1 MILLIGRAM(S): at 08:51

## 2019-01-16 RX ADMIN — Medication 1 MILLIGRAM(S): at 15:30

## 2019-01-16 RX ADMIN — Medication 81 MILLIGRAM(S): at 10:53

## 2019-01-16 RX ADMIN — DIVALPROEX SODIUM 250 MILLIGRAM(S): 500 TABLET, DELAYED RELEASE ORAL at 05:47

## 2019-01-16 NOTE — PROGRESS NOTE ADULT - PROBLEM SELECTOR PLAN 2
history of multiple DVTs on long term Xarelto  no signs of bleeding at this time. continue vitamin supplementation  diet control.

## 2019-01-16 NOTE — PROGRESS NOTE ADULT - PROBLEM SELECTOR PROBLEM 2
Deep vein thrombosis (DVT) of lower extremity, unspecified chronicity, unspecified laterality, unspecified vein S/P gastric bypass

## 2019-01-16 NOTE — PROGRESS NOTE ADULT - PROBLEM SELECTOR PLAN 3
continue vitamin supplementation  diet control. was on losartan in past, BP acceptable now off meds  continue to monitor.

## 2019-01-16 NOTE — PROGRESS NOTE ADULT - NSHPATTENDINGPLANDISCUSS_GEN_ALL_CORE
who is in agreement that patient needs psych treatment.
ED provider, Psychiatrist DR fox, DR dos santos

## 2019-01-16 NOTE — PROGRESS NOTE BEHAVIORAL HEALTH - NSBHFUPINTERVALHXFT_PSY_A_CORE
Patient seen, evaluated and chart reviewed. Patient is currently compliant with treatment, less manic than the other day. She continues to ask inappropriate questions and making inappropriate statements, but significantly less so. She picked her right wrist with her teeth, and she required a bandage. No PRNs needed since last seen. No self-biting episodes in last 24 hours. She is currently calm and doing a cross-word puzzle. As per  there is no change in her behavior. Patient seen, evaluated and chart reviewed. Patient is currently compliant with treatment, less manic than the other day. She continues to ask inappropriate questions and making inappropriate statements, but significantly less so. She picked her right wrist with her teeth, and she required a bandage. No PRNs needed since last seen. Patient bit her wrist again this morning. "I just needed to bite my scabs, I do it all the time at home." She is currently calm and doing a cross-word puzzle. As per  there is no change in her behavior.

## 2019-01-16 NOTE — PROGRESS NOTE ADULT - ATTENDING COMMENTS
medically stable for transfer to inpatient psych unit.
60 minutes spent on this visit, 50% visit time spent in care co-ordination with other attendings and counselling patient  I have discussed care plan with patient and HCP,expressed understanding of problems treatment and their effect and side effects, alternatives in detail,I have asked if they have any questions and concerns and appropriately addressed them to best of my ability  Reviewed all diagonostic tests, lab results and drug drug interactions, and medications  had detailed discussion with  about her condition, pt has been having repeated admissions to hospitals and rehabs over last 2 yrs

## 2019-01-16 NOTE — PROGRESS NOTE ADULT - PROBLEM SELECTOR PLAN 4
was on losartan in past, BP acceptable now off meds  continue to monitor. Thought to be source of stroke.  mult dvt's   on chronic xarelto.

## 2019-01-16 NOTE — PROGRESS NOTE BEHAVIORAL HEALTH - ORIENTATION OTHER
unable to ascertain at this time due to limited exam as Patient sleeping

## 2019-01-16 NOTE — PROGRESS NOTE ADULT - SUBJECTIVE AND OBJECTIVE BOX
Patient is a 58y old  Female who presents with a chief complaint of Acute psychosis (15 Jorge 2019 11:06)    INTERVAL HPI/OVERNIGHT EVENTS: this AM with episode of biting/attempting to eat her arms.   needed cajoling to take a shower and perform ADLs.  asking bizaare and inappropriate questions.   asked me for food like pizza and rack of lamb.  when informed that is not available in the hospital she requested a phone to call her .  when told i would call him instead she asked me to tell him to bring cheddar cheese sun chips, pizza, grapes and her phone.     MEDICATIONS  (STANDING):  aspirin  chewable 81 milliGRAM(s) Oral daily  diVALproex  milliGRAM(s) Oral two times a day  lactobacillus acidophilus 1 Tablet(s) Oral two times a day with meals  melatonin 5 milliGRAM(s) Oral at bedtime  multivitamin   Solution 5 milliLiter(s) Oral daily  oxybutynin 5 milliGRAM(s) Oral two times a day  QUEtiapine 50 milliGRAM(s) Oral at bedtime  rivaroxaban 20 milliGRAM(s) Oral every 24 hours  thiamine 100 milliGRAM(s) Oral daily    MEDICATIONS  (PRN):  diphenhydrAMINE 25 milliGRAM(s) Oral at bedtime PRN Rash and/or Itching  LORazepam     Tablet 1 milliGRAM(s) Oral every 6 hours PRN Axniety and agitation    Allergies  sulfa drugs (Unknown)  sulfamethoxazole (Other)    REVIEW OF SYSTEMS:  CONSTITUTIONAL: No fever, weight loss, or fatigue  EYES: No eye pain, visual disturbances, or discharge  ENMT:  No difficulty hearing, tinnitus, vertigo; No sinus or throat pain  NECK: No pain or stiffness  BREASTS: No pain, masses, or nipple discharge  RESPIRATORY: No cough, wheezing, chills or hemoptysis; No shortness of breath  CARDIOVASCULAR: No chest pain, palpitations, lightheadedness, or leg swelling  GASTROINTESTINAL: No abdominal or epigastric pain. No nausea, vomiting, or hematemesis; No diarrhea or constipation. No melena or hematochezia.  GENITOURINARY: No dysuria, frequency, hematuria, or incontinence  NEUROLOGICAL: No headaches, memory loss, vertigo, loss of strength, numbness, or tremors  SKIN: No itching, burning, rashes, or lesions   LYMPH NODES: No enlarged glands  ENDOCRINE: No heat or cold intolerance; No hair loss; No polydipsia or polyuria  MUSCULOSKELETAL: No joint pain or swelling; No muscle, back, or extremity pain  PSYCHIATRIC: No depression, anxiety, or mood swings  HEME/LYMPH: No easy bruising, or bleeding gums  ALLERGY AND IMMUNOLOGIC: No hives or eczema    Vital Signs Last 24 Hrs  T(C): 36.8 (16 Jan 2019 07:27), Max: 37.1 (15 Jorge 2019 15:13)  T(F): 98.2 (16 Jan 2019 07:27), Max: 98.8 (15 Jorge 2019 15:13)  HR: 79 (16 Jan 2019 07:27) (69 - 79)  BP: 119/83 (16 Jan 2019 07:27) (119/83 - 122/79)  BP(mean): --  RR: 17 (16 Jan 2019 07:27) (17 - 18)  SpO2: 98% (16 Jan 2019 07:27) (96% - 98%)    PHYSICAL EXAM:  GENERAL: NAD, well-groomed, well-developed  HEAD:  Atraumatic, Normocephalic  EYES: EOMI, PERRLA, conjunctiva and sclera clear  ENMT: Moist mucous membranes  NECK: Supple, No JVD  NERVOUS SYSTEM:  Alert & Oriented X3, Good concentration; All 4 extremities mobile, no gross sensory deficits.   CHEST/LUNG: Clear to auscultation bilaterally; No rales, rhonchi, wheezing, or rubs  HEART: Regular rate and rhythm; No murmurs, rubs, or gallops  ABDOMEN: Soft, Nontender, Nondistended; Bowel sounds present  EXTREMITIES:  2+ Peripheral Pulses, No clubbing, cyanosis, or edema  LYMPH: No lymphadenopathy noted  SKIN: No rashes or lesions    LABS:              CAPILLARY BLOOD GLUCOSE          RADIOLOGY & ADDITIONAL TESTS:    Imaging Personally Reviewed:  [ ] YES     Consultant(s) Notes Reviewed:      Care Discussed with Consultants/Other Providers:    Advanced Directives: [ ] DNR  [ ] No feeding tube  [ ] MOLST in chart  [ ] MOLST completed today  [ ] Unknown Patient is a 58y old  Female who presents with a chief complaint of Acute psychosis (15 Jorge 2019 11:06)    INTERVAL HPI/OVERNIGHT EVENTS: this AM with episode of biting/attempting to eat her arms.   needed cajoling to take a shower and perform ADLs.  asking bizaare and inappropriate questions.   asked me for food like pizza and rack of lamb.  when informed that is not available in the hospital she requested a phone to call her .  when told i would call him instead she asked me to tell him to bring cheddar cheese sun chips, pizza, grapes and her phone.     MEDICATIONS  (STANDING):  aspirin  chewable 81 milliGRAM(s) Oral daily  diVALproex  milliGRAM(s) Oral two times a day  lactobacillus acidophilus 1 Tablet(s) Oral two times a day with meals  melatonin 5 milliGRAM(s) Oral at bedtime  multivitamin   Solution 5 milliLiter(s) Oral daily  oxybutynin 5 milliGRAM(s) Oral two times a day  QUEtiapine 50 milliGRAM(s) Oral at bedtime  rivaroxaban 20 milliGRAM(s) Oral every 24 hours  thiamine 100 milliGRAM(s) Oral daily    MEDICATIONS  (PRN):  diphenhydrAMINE 25 milliGRAM(s) Oral at bedtime PRN Rash and/or Itching  LORazepam     Tablet 1 milliGRAM(s) Oral every 6 hours PRN Axniety and agitation    Allergies  sulfa drugs (Unknown)  sulfamethoxazole (Other)    REVIEW OF SYSTEMS:  CONSTITUTIONAL: No fever, weight loss, or fatigue  EYES: No eye pain, visual disturbances, or discharge  ENMT:  No difficulty hearing, tinnitus, vertigo; No sinus or throat pain  NECK: No pain or stiffness  BREASTS: No pain, masses, or nipple discharge  RESPIRATORY: No cough, wheezing, chills or hemoptysis; No shortness of breath  CARDIOVASCULAR: No chest pain, palpitations, lightheadedness, or leg swelling  GASTROINTESTINAL: No abdominal or epigastric pain. No nausea, vomiting, or hematemesis; No diarrhea or constipation. No melena or hematochezia.  GENITOURINARY: No dysuria, frequency, hematuria, or incontinence  NEUROLOGICAL: No headaches, memory loss, vertigo, loss of strength, numbness, or tremors  SKIN: No itching, burning, rashes, or lesions   LYMPH NODES: No enlarged glands  ENDOCRINE: No heat or cold intolerance; No hair loss; No polydipsia or polyuria  MUSCULOSKELETAL: No joint pain or swelling; No muscle, back, or extremity pain  PSYCHIATRIC: No depression, anxiety, or mood swings  HEME/LYMPH: No easy bruising, or bleeding gums  ALLERGY AND IMMUNOLOGIC: No hives or eczema    Vital Signs Last 24 Hrs  T(C): 36.8 (16 Jan 2019 07:27), Max: 37.1 (15 Jorge 2019 15:13)  T(F): 98.2 (16 Jan 2019 07:27), Max: 98.8 (15 Jorge 2019 15:13)  HR: 79 (16 Jan 2019 07:27) (69 - 79)  BP: 119/83 (16 Jan 2019 07:27) (119/83 - 122/79)  BP(mean): --  RR: 17 (16 Jan 2019 07:27) (17 - 18)  SpO2: 98% (16 Jan 2019 07:27) (96% - 98%)    PHYSICAL EXAM:  GENERAL: NAD, well-groomed, well-developed  HEAD:  Atraumatic, Normocephalic  EYES: EOMI, PERRLA, conjunctiva and sclera clear  ENMT: Moist mucous membranes  NECK: Supple, No JVD  NERVOUS SYSTEM:  Alert & Oriented X3, Good concentration; All 4 extremities mobile, no gross sensory deficits.   CHEST/LUNG: Clear to auscultation bilaterally; No rales, rhonchi, wheezing, or rubs  HEART: Regular rate and rhythm; No murmurs, rubs, or gallops  ABDOMEN: Soft, Nontender, Nondistended; Bowel sounds present  EXTREMITIES:  2+ Peripheral Pulses, No clubbing, cyanosis, or edema  LYMPH: No lymphadenopathy noted  SKIN: both arm wounds from biting herself.     LABS:              CAPILLARY BLOOD GLUCOSE          RADIOLOGY & ADDITIONAL TESTS:    Imaging Personally Reviewed:  [ ] YES     Consultant(s) Notes Reviewed:      Care Discussed with Consultants/Other Providers:    Advanced Directives: [ ] DNR  [ ] No feeding tube  [ ] MOLST in chart  [ ] MOLST completed today  [ ] Unknown

## 2019-01-17 DIAGNOSIS — M54.9 DORSALGIA, UNSPECIFIED: ICD-10-CM

## 2019-01-17 LAB
ANION GAP SERPL CALC-SCNC: 9 MMOL/L — SIGNIFICANT CHANGE UP (ref 5–17)
BUN SERPL-MCNC: 33 MG/DL — HIGH (ref 7–23)
CALCIUM SERPL-MCNC: 8.5 MG/DL — SIGNIFICANT CHANGE UP (ref 8.4–10.5)
CHLORIDE SERPL-SCNC: 111 MMOL/L — HIGH (ref 96–108)
CHOLEST SERPL-MCNC: 117 MG/DL — SIGNIFICANT CHANGE UP (ref 10–199)
CO2 SERPL-SCNC: 26 MMOL/L — SIGNIFICANT CHANGE UP (ref 22–31)
CREAT SERPL-MCNC: 0.99 MG/DL — SIGNIFICANT CHANGE UP (ref 0.5–1.3)
GLUCOSE SERPL-MCNC: 80 MG/DL — SIGNIFICANT CHANGE UP (ref 70–99)
HCT VFR BLD CALC: 40.2 % — SIGNIFICANT CHANGE UP (ref 34.5–45)
HDLC SERPL-MCNC: 51 MG/DL — SIGNIFICANT CHANGE UP
HGB BLD-MCNC: 12.4 G/DL — SIGNIFICANT CHANGE UP (ref 11.5–15.5)
LIPID PNL WITH DIRECT LDL SERPL: 53 MG/DL — SIGNIFICANT CHANGE UP
MAGNESIUM SERPL-MCNC: 2.3 MG/DL — SIGNIFICANT CHANGE UP (ref 1.6–2.6)
MCHC RBC-ENTMCNC: 29.4 PG — SIGNIFICANT CHANGE UP (ref 27–34)
MCHC RBC-ENTMCNC: 30.8 GM/DL — LOW (ref 32–36)
MCV RBC AUTO: 95.3 FL — SIGNIFICANT CHANGE UP (ref 80–100)
NRBC # BLD: 0 /100 WBCS — SIGNIFICANT CHANGE UP (ref 0–0)
PLATELET # BLD AUTO: 209 K/UL — SIGNIFICANT CHANGE UP (ref 150–400)
POTASSIUM SERPL-MCNC: 4.2 MMOL/L — SIGNIFICANT CHANGE UP (ref 3.5–5.3)
POTASSIUM SERPL-SCNC: 4.2 MMOL/L — SIGNIFICANT CHANGE UP (ref 3.5–5.3)
RBC # BLD: 4.22 M/UL — SIGNIFICANT CHANGE UP (ref 3.8–5.2)
RBC # FLD: 14.5 % — SIGNIFICANT CHANGE UP (ref 10.3–14.5)
SODIUM SERPL-SCNC: 146 MMOL/L — HIGH (ref 135–145)
TOTAL CHOLESTEROL/HDL RATIO MEASUREMENT: 2.3 RATIO — LOW (ref 3.3–7.1)
TRIGL SERPL-MCNC: 63 MG/DL — SIGNIFICANT CHANGE UP (ref 10–149)
WBC # BLD: 5.81 K/UL — SIGNIFICANT CHANGE UP (ref 3.8–10.5)
WBC # FLD AUTO: 5.81 K/UL — SIGNIFICANT CHANGE UP (ref 3.8–10.5)

## 2019-01-17 PROCEDURE — 99232 SBSQ HOSP IP/OBS MODERATE 35: CPT

## 2019-01-17 RX ORDER — ACETAMINOPHEN 500 MG
650 TABLET ORAL EVERY 6 HOURS
Qty: 0 | Refills: 0 | Status: DISCONTINUED | OUTPATIENT
Start: 2019-01-17 | End: 2019-01-19

## 2019-01-17 RX ADMIN — Medication 1 MILLIGRAM(S): at 09:42

## 2019-01-17 RX ADMIN — Medication 1 MILLIGRAM(S): at 18:47

## 2019-01-17 RX ADMIN — DIVALPROEX SODIUM 500 MILLIGRAM(S): 500 TABLET, DELAYED RELEASE ORAL at 09:37

## 2019-01-17 RX ADMIN — QUETIAPINE FUMARATE 100 MILLIGRAM(S): 200 TABLET, FILM COATED ORAL at 21:39

## 2019-01-17 RX ADMIN — Medication 5 MILLIGRAM(S): at 05:21

## 2019-01-17 RX ADMIN — Medication 1 MILLIGRAM(S): at 01:38

## 2019-01-17 RX ADMIN — Medication 5 MILLILITER(S): at 13:36

## 2019-01-17 RX ADMIN — Medication 5 MILLIGRAM(S): at 21:40

## 2019-01-17 RX ADMIN — Medication 1 TABLET(S): at 09:36

## 2019-01-17 RX ADMIN — Medication 25 MILLIGRAM(S): at 18:26

## 2019-01-17 RX ADMIN — Medication 100 MILLIGRAM(S): at 13:37

## 2019-01-17 RX ADMIN — Medication 1 TABLET(S): at 18:25

## 2019-01-17 RX ADMIN — Medication 5 MILLIGRAM(S): at 18:25

## 2019-01-17 RX ADMIN — RIVAROXABAN 20 MILLIGRAM(S): KIT at 18:26

## 2019-01-17 RX ADMIN — Medication 25 MILLIGRAM(S): at 05:21

## 2019-01-17 RX ADMIN — Medication 81 MILLIGRAM(S): at 13:37

## 2019-01-17 RX ADMIN — DIVALPROEX SODIUM 500 MILLIGRAM(S): 500 TABLET, DELAYED RELEASE ORAL at 18:26

## 2019-01-17 NOTE — PROGRESS NOTE ADULT - SUBJECTIVE AND OBJECTIVE BOX
Patient is a 58y old  Female who presents with a chief complaint of Acute psychosis (15 Jorge 2019 11:06)    INTERVAL HPI/OVERNIGHT EVENTS: patient sitting in bed coloring.  asking why she didnt get pizza for dinner.  then reported recurrent back pain that only dilaudid IV works on.     MEDICATIONS  (STANDING):  aspirin  chewable 81 milliGRAM(s) Oral daily  diVALproex  milliGRAM(s) Oral two times a day  lactobacillus acidophilus 1 Tablet(s) Oral two times a day with meals  melatonin 5 milliGRAM(s) Oral at bedtime  multivitamin   Solution 5 milliLiter(s) Oral daily  oxybutynin 5 milliGRAM(s) Oral two times a day  QUEtiapine 100 milliGRAM(s) Oral at bedtime  rivaroxaban 20 milliGRAM(s) Oral every 24 hours  thiamine 100 milliGRAM(s) Oral daily  topiramate 25 milliGRAM(s) Oral two times a day    MEDICATIONS  (PRN):  acetaminophen   Tablet .. 650 milliGRAM(s) Oral every 6 hours PRN Mild Pain (1 - 3)  diphenhydrAMINE 25 milliGRAM(s) Oral at bedtime PRN Rash and/or Itching  LORazepam     Tablet 1 milliGRAM(s) Oral every 6 hours PRN Axniety and agitation      Allergies  sulfa drugs (Unknown)  sulfamethoxazole (Other)      REVIEW OF SYSTEMS:  negative aside from hpi    Vital Signs Last 24 Hrs  T(C): 36.6 (17 Jan 2019 15:00), Max: 36.8 (16 Jan 2019 23:03)  T(F): 97.9 (17 Jan 2019 15:00), Max: 98.3 (16 Jan 2019 23:03)  HR: 71 (17 Jan 2019 15:00) (65 - 87)  BP: 104/70 (17 Jan 2019 15:00) (92/67 - 113/78)  BP(mean): --  RR: 16 (17 Jan 2019 15:00) (16 - 17)  SpO2: 98% (17 Jan 2019 15:00) (95% - 98%)    PHYSICAL EXAM:  GENERAL: NAD, well-groomed, well-developed  HEAD:  Atraumatic, Normocephalic  EYES:  conjunctiva and sclera clear  ENMT: Moist mucous membranes  NECK: Supple, No JVD  NERVOUS SYSTEM:  Alert & Oriented X3, Good concentration; All 4 extremities mobile, no gross sensory deficits.   CHEST/LUNG: Clear to auscultation bilaterally;   HEART: Regular rate and rhythm;   ABDOMEN: Soft, Nontender, Nondistended; Bowel sounds present  EXTREMITIES:  2+ Peripheral Pulses, No clubbing, cyanosis, or edema      LABS:                        12.4   5.81  )-----------( 209      ( 17 Jan 2019 08:21 )             40.2     17 Jan 2019 08:21    146    |  111    |  33     ----------------------------<  80     4.2     |  26     |  0.99     Ca    8.5        17 Jan 2019 08:21  Mg     2.3       17 Jan 2019 08:21          CAPILLARY BLOOD GLUCOSE          RADIOLOGY & ADDITIONAL TESTS:    Imaging Personally Reviewed:  [ ] YES     Consultant(s) Notes Reviewed:      Care Discussed with Consultants/Other Providers:    Advanced Directives: [ ] DNR  [ ] No feeding tube  [ ] MOLST in chart  [ ] MOLST completed today  [ ] Unknown

## 2019-01-18 LAB
MENADIONE SERPL-MCNC: 0.16 NG/ML — SIGNIFICANT CHANGE UP (ref 0.13–1.88)
VALPROATE SERPL-MCNC: 67 UG/ML — SIGNIFICANT CHANGE UP (ref 50–100)

## 2019-01-18 PROCEDURE — 99232 SBSQ HOSP IP/OBS MODERATE 35: CPT

## 2019-01-18 RX ORDER — QUETIAPINE FUMARATE 200 MG/1
200 TABLET, FILM COATED ORAL AT BEDTIME
Qty: 0 | Refills: 0 | Status: DISCONTINUED | OUTPATIENT
Start: 2019-01-18 | End: 2019-01-19

## 2019-01-18 RX ADMIN — Medication 5 MILLIGRAM(S): at 22:12

## 2019-01-18 RX ADMIN — Medication 5 MILLILITER(S): at 13:26

## 2019-01-18 RX ADMIN — RIVAROXABAN 20 MILLIGRAM(S): KIT at 17:57

## 2019-01-18 RX ADMIN — Medication 25 MILLIGRAM(S): at 06:00

## 2019-01-18 RX ADMIN — DIVALPROEX SODIUM 500 MILLIGRAM(S): 500 TABLET, DELAYED RELEASE ORAL at 06:00

## 2019-01-18 RX ADMIN — DIVALPROEX SODIUM 500 MILLIGRAM(S): 500 TABLET, DELAYED RELEASE ORAL at 17:58

## 2019-01-18 RX ADMIN — Medication 100 MILLIGRAM(S): at 13:26

## 2019-01-18 RX ADMIN — QUETIAPINE FUMARATE 200 MILLIGRAM(S): 200 TABLET, FILM COATED ORAL at 22:12

## 2019-01-18 RX ADMIN — Medication 5 MILLIGRAM(S): at 17:58

## 2019-01-18 RX ADMIN — Medication 5 MILLIGRAM(S): at 06:00

## 2019-01-18 RX ADMIN — Medication 1 TABLET(S): at 17:57

## 2019-01-18 RX ADMIN — Medication 81 MILLIGRAM(S): at 13:25

## 2019-01-18 RX ADMIN — Medication 1 TABLET(S): at 09:27

## 2019-01-18 RX ADMIN — Medication 25 MILLIGRAM(S): at 17:58

## 2019-01-18 NOTE — PROGRESS NOTE ADULT - PROBLEM SELECTOR PLAN 5
dvt proph: full dose xarelto
likely msk pain. will trial tylenol.  if appears in distress or continued pain will re-evaluate.
monitor and cont home meds
no complaints of pain today. will cont tylenol prn.

## 2019-01-18 NOTE — PROGRESS NOTE BEHAVIORAL HEALTH - BODY HABITUS
Obese/Well nourished
Well nourished/Obese
Well nourished/Obese
Obese/Well nourished
Obese/Well nourished

## 2019-01-18 NOTE — PROGRESS NOTE ADULT - ASSESSMENT
58 female with    1. Anxiety/Possible Neurodegenerative disorder: med titration as per PSYCH team.  1:1 for safety    2. History of stroke: No NEW findings.  d/w Dr. Arreola - no new neuro recommendations.  Continue Aspirin, Xarelto    3. s/p gastrectomy with post-op complications: no current GI symptoms.  Ate breakfast this AM per bedside aide.    4. HTN: Blood pressure meds with hold parameters     5. GOC discussion: informed  about no apparent active medical issues; pending repeat PSYCH eval for 1N    6. d/w staff plan of care.  No medical C/I to 1N transfer.  Hospitalist team will continue to f/up on 1N as needed.
58 female admitted with bizaare behavior and suicidal ideation
58 female admitted with bizaare behavior and suicidal ideation
58 female with    1. Anxiety/Possible Neurodegenerative disorder: med titration as per PSYCH team.  1:1 for safety.  I feel patient voluntarily chooses to disengage from providers when she wants.  she is capable of letting her needs be known.  Chooses to sleep because there is not much else to do.  d/w  plan of care 1/12 in detail.    2. History of stroke: No NEW findings.  d/w Dr. Arreola - no new neuro recommendations.  Continue Aspirin, Xarelto    3. s/p gastrectomy with post-op complications: no current GI symptoms.  Ate breakfast this AM per bedside aide.  d/w Dr. Garcia - no new intervention.    4. HTN: Blood pressure meds with hold parameters     5. GOC discussion: informed  about no apparent active medical issues; await PSYCH final recommendations.  currently titrating down Valium.    6. d/w staff plan of care.  No medical C/I to 1N transfer.  Hospitalist team will continue to f/up on 1N as needed.
58 female with    1. Anxiety/Possible Neurodegenerative disorder: med titration as per PSYCH team.  1:1 for safety.  I feel patient voluntarily chooses to disengage from providers when she wants.  she is capable of letting her needs be known.  Chooses to sleep because there is not much else to do.  d/w  plan of care 1/12 in detail.    2. History of stroke: No NEW findings.  d/w Dr. Arreola - no new neuro recommendations.  Continue Aspirin, Xarelto    3. s/p gastrectomy with post-op complications: no current GI symptoms.  Ate breakfast this AM per bedside aide.  d/w Dr. Garcia - no new intervention.  Thiamine daily.    4. HTN: Blood pressure meds with hold parameters     5. GOC discussion: informed  about no apparent active medical issues; await PSYCH final recommendations.  currently titrating down Valium.    6. d/w staff plan of care.  No medical C/I to 1N transfer.  Hospitalist team will continue to f/up on 1N as needed.
58 female with    1. Anxiety/Possible Neurodegenerative disorder: med titration as per PSYCH team.  1:1 for safety.  await Psych follow up and plan.     2. History of stroke: No NEW findings.  no new neuro recommendations.    3. s/p gastrectomy with post-op complications: no current GI symptoms.  continue vitamins.   f/u remaining vitamin levels.     4. HTN: Blood pressure meds with hold parameters     5. GOC discussion: informed  about no apparent active medical issues; await PSYCH final recommendations.    6. d/w staff plan of care.  Await transfer to inpatient psych.   Hospitalist team will continue to f/up on 1N as needed.
58 y  old with PMH of gastrectomy intestinal fistulas diverticulitis, bariatric surg, multiple DVT, anxiety, insomnia, brought to ED by , states patient has been behaving more erratically, today patient left the house, he found her walking down the block, she has been making purchases on credit cards that she cannot pay for, she has begun to wear clothes that are stained, disheveled.    states she told him "I might as well be dead".   is concerned patient might harm herself.  states she did not state a plan of suicide.  2 mos ago was seen by neurologist Dr Joiner, had MRI done,  states he was told she had a stroke , but timeframe was not determined.  no recent illness or trauma.  patient is on lexapro for hx anxiety , prescribed by her PMD Dr Caldwell, had cardiac work up by Dr Antonio Thao who is also a cardiologist,  states stress and echo were normal.   states patient has hx of multiple surgeries, bariatric, and abdominal. patient does not have a psychiatrist as per .  no hallucination,not homicidal, no pranoia, no weakness.  pt had tele psych consult,-pt is noted to be having erratic behavioure and thinks that for her worsening mental condition she needs neuro assesement, as per Dr Muniz (Telepsych)Neurology consult done by DR Arreola,there is no localizing signs no evidence of CVA, doubt dementia,  IN ED urinanalysis is suspicious of UTI and started on ceftriaxone, cultures to be followed.  Being admitted for alered behaviour with suicidal ideation with uti, with h/o dvt and on xarelto, with h/o bariatric surg and complication following that.  pt is medically stable Id consult called, psych f up and social work consult
58 female admitted with bizaare behavior and suicidal ideation

## 2019-01-18 NOTE — PROGRESS NOTE ADULT - PROVIDER SPECIALTY LIST ADULT
Bariatric Surgery
Hospitalist
Infectious Disease
Infectious Disease
Internal Medicine
Neurology
Hospitalist
Hospitalist

## 2019-01-18 NOTE — PROGRESS NOTE BEHAVIORAL HEALTH - NSBHFUPINTERVALHXFT_PSY_A_CORE
Patient seen, evaluated and chart reviewed. Patient is currently compliant with treatment, less manic than the other day, but she continues inappropriate behavior and continues to ask inappropriate questions and making inappropriate statements. No PRNs needed since last seen. She is currently laying down and taking off her wrist ID band and attempting to scratch her forearms. There is no apparent change in behavior.

## 2019-01-18 NOTE — PROGRESS NOTE ADULT - SUBJECTIVE AND OBJECTIVE BOX
Patient is a 58y old  Female who presents with a chief complaint of Acute psychosis (15 Jorge 2019 11:06)      INTERVAL HPI/OVERNIGHT EVENTS: patient biting her arm earlier today.  RN had to wrap it with thick dressing to prevent her from eating herself.   asking for pizza again, despite being told she wont get any.  keeps requesting random food items.     MEDICATIONS  (STANDING):  aspirin  chewable 81 milliGRAM(s) Oral daily  diVALproex  milliGRAM(s) Oral two times a day  lactobacillus acidophilus 1 Tablet(s) Oral two times a day with meals  melatonin 5 milliGRAM(s) Oral at bedtime  multivitamin   Solution 5 milliLiter(s) Oral daily  oxybutynin 5 milliGRAM(s) Oral two times a day  QUEtiapine 100 milliGRAM(s) Oral at bedtime  rivaroxaban 20 milliGRAM(s) Oral every 24 hours  thiamine 100 milliGRAM(s) Oral daily  topiramate 25 milliGRAM(s) Oral two times a day    MEDICATIONS  (PRN):  acetaminophen   Tablet .. 650 milliGRAM(s) Oral every 6 hours PRN Mild Pain (1 - 3)  diphenhydrAMINE 25 milliGRAM(s) Oral at bedtime PRN Rash and/or Itching  LORazepam     Tablet 1 milliGRAM(s) Oral every 6 hours PRN Axniety and agitation      Allergies  sulfa drugs (Unknown)  sulfamethoxazole (Other)    REVIEW OF SYSTEMS:  limited due to mental status.     Vital Signs Last 24 Hrs  T(C): 36.6 (18 Jan 2019 07:53), Max: 36.6 (17 Jan 2019 15:00)  T(F): 97.8 (18 Jan 2019 07:53), Max: 97.9 (17 Jan 2019 15:00)  HR: 84 (18 Jan 2019 07:53) (71 - 84)  BP: 117/59 (18 Jan 2019 07:53) (104/70 - 117/59)  BP(mean): --  RR: 14 (18 Jan 2019 07:53) (14 - 16)  SpO2: 95% (18 Jan 2019 07:53) (95% - 98%)    PHYSICAL EXAM:  GENERAL: NAD, well-groomed, well-developed  HEAD:  Atraumatic, Normocephalic  EYES: conjunctiva and sclera clear  ENMT: Moist mucous membranes  NECK: Supple, No JVD  NERVOUS SYSTEM:  Awake, calm    CHEST/LUNG: Clear to auscultation bilaterally;   HEART: Regular rate and rhythm;   ABDOMEN: Soft, Nontender, Nondistended; Bowel sounds present  EXTREMITIES:  no edema    LABS:      Ca    8.5        17 Jan 2019 08:21          CAPILLARY BLOOD GLUCOSE          RADIOLOGY & ADDITIONAL TESTS:    Imaging Personally Reviewed:  [ ] YES     Consultant(s) Notes Reviewed:      Care Discussed with Consultants/Other Providers:    Advanced Directives: [ ] DNR  [ ] No feeding tube  [ ] MOLST in chart  [ ] MOLST completed today  [ ] Unknown

## 2019-01-18 NOTE — PROGRESS NOTE BEHAVIORAL HEALTH - NSBHCONSULTOBSREASON_PSY_A_CORE FT
Patient is unpredictable and could elope

## 2019-01-18 NOTE — PROGRESS NOTE BEHAVIORAL HEALTH - NSBHCONSULTMEDSEVERE_PSY_A_CORE FT
Zyprexa 5mg IM q6hrs prn (do NOT give any IM benzos within 4 hours of administration)

## 2019-01-18 NOTE — PROGRESS NOTE ADULT - PROBLEM SELECTOR PLAN 1
psych consult,pt on disability due to psych
still with psychotic behavior, inappropriate requests.  self mutilating behavior.  impulsive.   no insight or capacity to understand ramifications of her behavior  still requires 1:1 obs for safety  cleared by Neurology  Continue meds per psych

## 2019-01-18 NOTE — PROGRESS NOTE BEHAVIORAL HEALTH - NSBHFUPINTERVALCCFT_PSY_A_CORE
Can I get colonoscopy tomorrow. I would be fine if only I can get the nails done.
She bit her wrist today. I did it as I had scabs on my wrist.
I have to scratch my wrists. It is none of your business why.

## 2019-01-18 NOTE — PROGRESS NOTE BEHAVIORAL HEALTH - IMPULSE CONTROL
Letter sent to patients home address with results.  Ayaan Hi,  For [facility] 
Impaired

## 2019-01-18 NOTE — PROGRESS NOTE BEHAVIORAL HEALTH - PRIMARY DX
Neurobehavioral disorder

## 2019-01-18 NOTE — PROGRESS NOTE BEHAVIORAL HEALTH - NSBHCONSULTMEDS_PSY_A_CORE FT
Increase Seroquel to 200 mg po at HS, Valproic acid level to be obtained.
Increase Seroquel to 50 mg po at HS, Ativan 1 mg po q 6 hourly - anxiety Depakote 250 mg po bid.
Decrease Valium 10 mg po at HS to 5mg PO qhs as patient is oversedated this morning. Continue Lexapro 10mg po qd for now, Seroquel 25 mg po at HS, Ativan 1 mg po q 6 hourly - anxiety.
Decrease further Valium 5mg PO to 2mg PO qhs as patient is again oversedated this morning. Continue tapering off Lexapro - 5mg PO x days then off . Continue Seroquel 25 mg po at HS, Ativan 1 mg po q 6 hourly - anxiety.
Increase Seroquel to 100 mg po at HS, Ativan 1 mg po q 6 hourly - anxiety. Increase Depakote to 500 mg po bid.

## 2019-01-18 NOTE — PROGRESS NOTE BEHAVIORAL HEALTH - NS ED BHA MSE SPEECH RATE
Fast, difficult to interrupt
Slowed
Slowed
Fast, difficult to interrupt
Fast, difficult to interrupt

## 2019-01-18 NOTE — PROGRESS NOTE ADULT - PROBLEM SELECTOR PROBLEM 5
Prophylactic measure
Back pain, unspecified back location, unspecified back pain laterality, unspecified chronicity
Back pain, unspecified back location, unspecified back pain laterality, unspecified chronicity
Essential hypertension

## 2019-01-18 NOTE — PROGRESS NOTE BEHAVIORAL HEALTH - RISK ASSESSMENT
At this time patient is unpredictable and requires constant supervision as she us disorganized and unpredictable

## 2019-01-18 NOTE — PROGRESS NOTE BEHAVIORAL HEALTH - NSBHCONSULTMEDANXIETY_PSY_A_CORE FT
Ativan 1 mg po q 6 hourly prn - anxiety

## 2019-01-18 NOTE — PROGRESS NOTE BEHAVIORAL HEALTH - NSBHCONSFOLLOWNEEDS_PSY_A_CORE
Patient needs further psychiatric safety assessment prior to discharge

## 2019-01-19 ENCOUNTER — INPATIENT (INPATIENT)
Facility: HOSPITAL | Age: 59
LOS: 12 days | Discharge: HOME | DRG: 884 | End: 2019-02-01
Attending: PSYCHIATRY & NEUROLOGY | Admitting: PSYCHIATRY & NEUROLOGY
Payer: COMMERCIAL

## 2019-01-19 ENCOUNTER — TRANSCRIPTION ENCOUNTER (OUTPATIENT)
Age: 59
End: 2019-01-19

## 2019-01-19 VITALS
SYSTOLIC BLOOD PRESSURE: 135 MMHG | OXYGEN SATURATION: 97 % | TEMPERATURE: 98 F | RESPIRATION RATE: 18 BRPM | HEART RATE: 69 BPM | DIASTOLIC BLOOD PRESSURE: 71 MMHG

## 2019-01-19 DIAGNOSIS — Z41.9 ENCOUNTER FOR PROCEDURE FOR PURPOSES OTHER THAN REMEDYING HEALTH STATE, UNSPECIFIED: Chronic | ICD-10-CM

## 2019-01-19 DIAGNOSIS — Z98.84 BARIATRIC SURGERY STATUS: Chronic | ICD-10-CM

## 2019-01-19 DIAGNOSIS — F29 UNSPECIFIED PSYCHOSIS NOT DUE TO A SUBSTANCE OR KNOWN PHYSIOLOGICAL CONDITION: ICD-10-CM

## 2019-01-19 PROCEDURE — 84439 ASSAY OF FREE THYROXINE: CPT

## 2019-01-19 PROCEDURE — 80048 BASIC METABOLIC PNL TOTAL CA: CPT

## 2019-01-19 PROCEDURE — 90792 PSYCH DIAG EVAL W/MED SRVCS: CPT

## 2019-01-19 PROCEDURE — 70450 CT HEAD/BRAIN W/O DYE: CPT

## 2019-01-19 PROCEDURE — 85027 COMPLETE CBC AUTOMATED: CPT

## 2019-01-19 PROCEDURE — 36415 COLL VENOUS BLD VENIPUNCTURE: CPT

## 2019-01-19 PROCEDURE — 80053 COMPREHEN METABOLIC PANEL: CPT

## 2019-01-19 PROCEDURE — 97116 GAIT TRAINING THERAPY: CPT

## 2019-01-19 PROCEDURE — 99239 HOSP IP/OBS DSCHRG MGMT >30: CPT

## 2019-01-19 PROCEDURE — 99285 EMERGENCY DEPT VISIT HI MDM: CPT | Mod: 25

## 2019-01-19 PROCEDURE — 83735 ASSAY OF MAGNESIUM: CPT

## 2019-01-19 PROCEDURE — 82746 ASSAY OF FOLIC ACID SERUM: CPT

## 2019-01-19 PROCEDURE — 97110 THERAPEUTIC EXERCISES: CPT

## 2019-01-19 PROCEDURE — 84443 ASSAY THYROID STIM HORMONE: CPT

## 2019-01-19 PROCEDURE — 96365 THER/PROPH/DIAG IV INF INIT: CPT

## 2019-01-19 PROCEDURE — 84207 ASSAY OF VITAMIN B-6: CPT

## 2019-01-19 PROCEDURE — 83550 IRON BINDING TEST: CPT

## 2019-01-19 PROCEDURE — 81001 URINALYSIS AUTO W/SCOPE: CPT

## 2019-01-19 PROCEDURE — 80164 ASSAY DIPROPYLACETIC ACD TOT: CPT

## 2019-01-19 PROCEDURE — 84425 ASSAY OF VITAMIN B-1: CPT

## 2019-01-19 PROCEDURE — 80307 DRUG TEST PRSMV CHEM ANLYZR: CPT

## 2019-01-19 PROCEDURE — 97161 PT EVAL LOW COMPLEX 20 MIN: CPT

## 2019-01-19 PROCEDURE — 87086 URINE CULTURE/COLONY COUNT: CPT

## 2019-01-19 PROCEDURE — 84436 ASSAY OF TOTAL THYROXINE: CPT

## 2019-01-19 PROCEDURE — 87186 SC STD MICRODIL/AGAR DIL: CPT

## 2019-01-19 PROCEDURE — 84480 ASSAY TRIIODOTHYRONINE (T3): CPT

## 2019-01-19 PROCEDURE — 93005 ELECTROCARDIOGRAM TRACING: CPT

## 2019-01-19 PROCEDURE — 97530 THERAPEUTIC ACTIVITIES: CPT

## 2019-01-19 PROCEDURE — 84597 ASSAY OF VITAMIN K: CPT

## 2019-01-19 PROCEDURE — 83540 ASSAY OF IRON: CPT

## 2019-01-19 PROCEDURE — 84630 ASSAY OF ZINC: CPT

## 2019-01-19 PROCEDURE — 86780 TREPONEMA PALLIDUM: CPT

## 2019-01-19 PROCEDURE — 82607 VITAMIN B-12: CPT

## 2019-01-19 PROCEDURE — 84590 ASSAY OF VITAMIN A: CPT

## 2019-01-19 PROCEDURE — 82306 VITAMIN D 25 HYDROXY: CPT

## 2019-01-19 PROCEDURE — 80061 LIPID PANEL: CPT

## 2019-01-19 RX ORDER — LANOLIN ALCOHOL/MO/W.PET/CERES
1 CREAM (GRAM) TOPICAL
Qty: 0 | Refills: 0 | COMMUNITY
Start: 2019-01-19

## 2019-01-19 RX ORDER — THIAMINE MONONITRATE (VIT B1) 100 MG
1 TABLET ORAL
Qty: 0 | Refills: 0 | COMMUNITY
Start: 2019-01-19

## 2019-01-19 RX ORDER — DIPHENHYDRAMINE HCL 50 MG
0 CAPSULE ORAL
Qty: 0 | Refills: 0 | COMMUNITY

## 2019-01-19 RX ORDER — ASPIRIN/CALCIUM CARB/MAGNESIUM 324 MG
81 TABLET ORAL DAILY
Qty: 0 | Refills: 0 | Status: DISCONTINUED | OUTPATIENT
Start: 2019-01-19 | End: 2019-02-01

## 2019-01-19 RX ORDER — TOPIRAMATE 25 MG
1 TABLET ORAL
Qty: 0 | Refills: 0 | COMMUNITY
Start: 2019-01-19

## 2019-01-19 RX ORDER — ACETAMINOPHEN 500 MG
2 TABLET ORAL
Qty: 0 | Refills: 0 | COMMUNITY
Start: 2019-01-19

## 2019-01-19 RX ORDER — DIVALPROEX SODIUM 500 MG/1
1 TABLET, DELAYED RELEASE ORAL
Qty: 0 | Refills: 0 | COMMUNITY
Start: 2019-01-19

## 2019-01-19 RX ORDER — THIAMINE MONONITRATE (VIT B1) 100 MG
100 TABLET ORAL DAILY
Qty: 0 | Refills: 0 | Status: DISCONTINUED | OUTPATIENT
Start: 2019-01-19 | End: 2019-02-01

## 2019-01-19 RX ORDER — LACTOBACILLUS ACIDOPHILUS 100MM CELL
1 CAPSULE ORAL
Qty: 0 | Refills: 0 | COMMUNITY
Start: 2019-01-19

## 2019-01-19 RX ORDER — TOPIRAMATE 25 MG
25 TABLET ORAL
Qty: 0 | Refills: 0 | Status: DISCONTINUED | OUTPATIENT
Start: 2019-01-19 | End: 2019-02-01

## 2019-01-19 RX ORDER — OXYBUTYNIN CHLORIDE 5 MG
5 TABLET ORAL
Qty: 0 | Refills: 0 | Status: DISCONTINUED | OUTPATIENT
Start: 2019-01-19 | End: 2019-02-01

## 2019-01-19 RX ORDER — LANOLIN ALCOHOL/MO/W.PET/CERES
0 CREAM (GRAM) TOPICAL
Qty: 0 | Refills: 0 | COMMUNITY

## 2019-01-19 RX ORDER — OLANZAPINE 15 MG/1
5 TABLET, FILM COATED ORAL EVERY 6 HOURS
Qty: 0 | Refills: 0 | Status: DISCONTINUED | OUTPATIENT
Start: 2019-01-19 | End: 2019-02-01

## 2019-01-19 RX ORDER — TOLTERODINE TARTRATE 1 MG/1
1 TABLET, FILM COATED ORAL
Qty: 0 | Refills: 0 | COMMUNITY

## 2019-01-19 RX ORDER — ESCITALOPRAM OXALATE 10 MG/1
1 TABLET, FILM COATED ORAL
Qty: 0 | Refills: 0 | COMMUNITY

## 2019-01-19 RX ORDER — LACTOBACILLUS ACIDOPHILUS 100MM CELL
1 CAPSULE ORAL
Qty: 0 | Refills: 0 | Status: DISCONTINUED | OUTPATIENT
Start: 2019-01-19 | End: 2019-02-01

## 2019-01-19 RX ORDER — OXYBUTYNIN CHLORIDE 5 MG
1 TABLET ORAL
Qty: 0 | Refills: 0 | COMMUNITY
Start: 2019-01-19

## 2019-01-19 RX ORDER — QUETIAPINE FUMARATE 200 MG/1
200 TABLET, FILM COATED ORAL AT BEDTIME
Qty: 0 | Refills: 0 | Status: DISCONTINUED | OUTPATIENT
Start: 2019-01-19 | End: 2019-02-01

## 2019-01-19 RX ORDER — DIPHENHYDRAMINE HCL 50 MG
1 CAPSULE ORAL
Qty: 0 | Refills: 0 | COMMUNITY
Start: 2019-01-19

## 2019-01-19 RX ORDER — LANOLIN ALCOHOL/MO/W.PET/CERES
5 CREAM (GRAM) TOPICAL AT BEDTIME
Qty: 0 | Refills: 0 | Status: DISCONTINUED | OUTPATIENT
Start: 2019-01-19 | End: 2019-02-01

## 2019-01-19 RX ORDER — DIVALPROEX SODIUM 500 MG/1
500 TABLET, DELAYED RELEASE ORAL
Qty: 0 | Refills: 0 | Status: DISCONTINUED | OUTPATIENT
Start: 2019-01-19 | End: 2019-01-22

## 2019-01-19 RX ORDER — RIVAROXABAN 15 MG-20MG
20 KIT ORAL EVERY 24 HOURS
Qty: 0 | Refills: 0 | Status: DISCONTINUED | OUTPATIENT
Start: 2019-01-19 | End: 2019-02-01

## 2019-01-19 RX ORDER — QUETIAPINE FUMARATE 200 MG/1
1 TABLET, FILM COATED ORAL
Qty: 0 | Refills: 0 | COMMUNITY
Start: 2019-01-19

## 2019-01-19 RX ADMIN — Medication 5 MILLIGRAM(S): at 20:15

## 2019-01-19 RX ADMIN — Medication 5 MILLIGRAM(S): at 06:17

## 2019-01-19 RX ADMIN — DIVALPROEX SODIUM 500 MILLIGRAM(S): 500 TABLET, DELAYED RELEASE ORAL at 20:15

## 2019-01-19 RX ADMIN — Medication 25 MILLIGRAM(S): at 20:15

## 2019-01-19 RX ADMIN — QUETIAPINE FUMARATE 200 MILLIGRAM(S): 200 TABLET, FILM COATED ORAL at 20:15

## 2019-01-19 RX ADMIN — Medication 1 TABLET(S): at 09:08

## 2019-01-19 RX ADMIN — DIVALPROEX SODIUM 500 MILLIGRAM(S): 500 TABLET, DELAYED RELEASE ORAL at 06:18

## 2019-01-19 NOTE — DISCHARGE NOTE ADULT - ADDITIONAL INSTRUCTIONS
Patient is being transferred to the psych unit for further treatment  Please follow up with your doctor upon discharge

## 2019-01-19 NOTE — BEHAVIORAL HEALTH ASSESSMENT NOTE - HPI (INCLUDE ILLNESS QUALITY, SEVERITY, DURATION, TIMING, CONTEXT, MODIFYING FACTORS, ASSOCIATED SIGNS AND SYMPTOMS)
Mrs. 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, although this has been managed by her PMD with Lexapro for 20 years, without any history of prior psychiatric hospitalizations nor any prior self-harm or suicide attempts, with extensive complicated medical history of bariatric surgery with complication leading to a total gastrectomy, as well as diverticulitis, multiple DVTs, intestinal fistula, obesity, HTN, and a recent unspecified CVA, who was BIB her  from home on 1/10/19 for progressively declining functional behavior and disorientation, of unknown origin, but today including patient making a passive statement "I might as well be dead." Patient seen by Telepsychiatry and could not participate in a meaningful manner hence all information was from      COLLATERAL FROM  KATHY:  He endorses that patient has not been at baseline for the last 6.5 years since having a bariatric surgery, noting that she has been steadily declining for the last 6 months especially.  At baseline patient is anxious, but had been well functioning prior to this change, working previously as a  and having an honors degree from [employer]. She has never manifest any significant psychopathlogy, including any periods of significant depression, hypomania/aden, psychosis, OCD, or having a history of trauma. She is an anxious woman, but this has been moderate in intensity and stabilized with Lexapro 20 mg for 20 years. More recently, she has shown ongoing decline manifest as poor hygiene (wearing diapers and does not change herself, cutting the soiled portion out with scissors and/or spending several days soiled), with poor sleeping habits sleeping 3 hours a night (without any associated manic symptoms such as rapid pressured speech, racing thoughts), poor eating habits (hoarding food since her initial bariatric surgery), and she has presented increasingly disorganized since her discharged from [facility] in February 2018, where she had been for a near 7 month medical hospitalization. Psychiatry was consulted during this admission and did not conclude any acute psychopathology.  She has also been calling family members promising them elaborate trips and concerts for the last 6 months. At this time patient has had changes in functioning including, for the last three weeks the patient has been attempting to steal the car to spend beyond her means. Last week the patient went to the bank and opened a new debit card then proceeded to spend $800 at the local [employer].  She then took a cab to a local [employer] store where she attempted to buy a 5 karat ring, but the cab company contacted her  and informed him of the patients presents and failure to pay her cab fare. At this time the patient continuously attempts to leave the home unattended earlier she ran out the home this morning to buy press on nails from [employer] when the collateral when to go find the patient she began arguing with collateral and reported “ I rather not live like this...I may as well be dead” at which time collateral presented to the ED with patient out of concern that "she may hurt herself."  During her time in the Emergency Department, she has consistently denied any thoughts of self-harm or suicide. Collateral denies concern for violence, endorses concern for patient safety/ suicidal statement made today. He reports that he and his adult children have been considering adult long term care for the patient as this  presentation has been ongoing for years. Collateral feels that the patient attempting to leave the home and drive which she has been instructed not to do by doctors for the last month makes the patient unsafe to be at home alone. He is ambivalent if the patient warrants a psychiatric admission and is in agreement with medical admission and work up.      Patient was admitted to medical floor where she was followed by Psychiatry Consult Service and was transferred to Unit 1N on a 2PC.

## 2019-01-19 NOTE — BEHAVIORAL HEALTH ASSESSMENT NOTE - SUMMARY
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

## 2019-01-19 NOTE — CHART NOTE - NSCHARTNOTEFT_GEN_A_CORE
Assessment:   Pt is a 57 yo female who presents with a chief complaint of acute psychosis. Tolerating regular diet without difficulty. Pt is being transferred to [facility]. Noted has bariatric hx: sleeve gastrectomy complicated by leak, stricture, fistulas converted to RYGB and then to esophagojejunostomy. Able to make needs know and calls the diet office for food preferences. No dietary complaints at this time.   Factors impacting intake: [x ] none [ ] nausea  [ ] vomiting [ ] diarrhea [ ] constipation  [ ]chewing problems [ ] swallowing issues  [ ] other:     Diet Presciption: Diet, Regular (01-10-19 @ 20:55)    Intake: good     Current Weight: n/a  % Weight Change    Pertinent Medications: MEDICATIONS  (STANDING):  aspirin  chewable 81 milliGRAM(s) Oral daily  diVALproex  milliGRAM(s) Oral two times a day  lactobacillus acidophilus 1 Tablet(s) Oral two times a day with meals  melatonin 5 milliGRAM(s) Oral at bedtime  multivitamin   Solution 5 milliLiter(s) Oral daily  oxybutynin 5 milliGRAM(s) Oral two times a day  QUEtiapine 200 milliGRAM(s) Oral at bedtime  rivaroxaban 20 milliGRAM(s) Oral every 24 hours  thiamine 100 milliGRAM(s) Oral daily  topiramate 25 milliGRAM(s) Oral two times a day    MEDICATIONS  (PRN):  acetaminophen   Tablet .. 650 milliGRAM(s) Oral every 6 hours PRN Mild Pain (1 - 3)  diphenhydrAMINE 25 milliGRAM(s) Oral at bedtime PRN Rash and/or Itching  LORazepam     Tablet 1 milliGRAM(s) Oral every 6 hours PRN Axniety and agitation    Pertinent Labs: 01-17 Na146 mmol/L<H> Glu 80 mg/dL K+ 4.2 mmol/L Cr  0.99 mg/dL BUN 33 mg/dL<H> 01-17 Chol 117 mg/dL LDL 53 mg/dL HDL 51 mg/dL Trig 63 mg/dL     CAPILLARY BLOOD GLUCOSE      Skin: R arm scratches wound     Estimated Needs:   [x ] no change since previous assessment  [ ] recalculated:     Previous Nutrition Diagnosis:   [ ] Inadequate Energy Intake [ ]Inadequate Oral Intake [ ] Excessive Energy Intake   [ ] Underweight [ ] Increased Nutrient Needs [ ] Overweight/Obesity   [x ] Altered GI Function [ ] Unintended Weight Loss [ ] Food & Nutrition Related Knowledge Deficit [ ] Malnutrition     Nutrition Diagnosis is [x ] ongoing  [ ] resolved [ ] not applicable     New Nutrition Diagnosis: [x ] not applicable       Interventions:   Recommend  [ ] Change Diet To:  [ ] Nutrition Supplement  [ ] Nutrition Support  [x ] Other: provide food preferences,     Monitoring and Evaluation:   [ x] PO intake [ x ] Tolerance to diet prescription [ x ] weights per protocol[ x ] labs[ x ] follow up per protocol  [ ] other: wound care protocol

## 2019-01-19 NOTE — BEHAVIORAL HEALTH ASSESSMENT NOTE - NSBHMEDSOTHERFT_PSY_A_CORE
on 2W started on melatonin 5mg po qhs, Seroquel 200mg PO qhs, depakote DR 500mg PO bid, Topamax 25mg PO bid

## 2019-01-19 NOTE — DISCHARGE NOTE ADULT - MEDICATION SUMMARY - MEDICATIONS TO STOP TAKING
I will STOP taking the medications listed below when I get home from the hospital:    escitalopram 20 mg oral tablet  -- 1 tab(s) by mouth once a day    tolterodine 4 mg oral capsule, extended release  -- 1 cap(s) by mouth once a day

## 2019-01-19 NOTE — DISCHARGE NOTE ADULT - CARE PLAN
Include Location In Plan?: No
Detail Level: Zone
Principal Discharge DX:	Neurobehavioral disorder  Goal:	Continue with current medications transfer to [address] [place] [address] for further medications management  Assessment and plan of treatment:	Cardiac diet  Secondary Diagnosis:	Essential hypertension  Goal:	Continue with home medications  Secondary Diagnosis:	Deep vein thrombosis (DVT) of lower extremity, unspecified chronicity, unspecified laterality, unspecified vein  Goal:	Continue with home medications

## 2019-01-19 NOTE — DISCHARGE NOTE ADULT - HOSPITAL COURSE
58 female admitted with bizaare behavior and suicidal ideation    1.  Neurobehavioral disorder. Still with psychotic behavior, inappropriate requests.  -self mutilating behavior.  impulsive.   -no insight or capacity to understand ramifications of her behavior  -still requires 1:1 obs for safety  -cleared by Neurology  -Continue meds per psych.   -Possible transfer to psych for further evaluation and treatment     2.  S/P gastric bypass.  continue vitamin supplementation  diet control.     3.  Essential hypertension.  Continue with home medications and Monitor bp      4.  DVT of axillary vein, chronic left.  Thought to be source of stroke.  mult dvt's   -on chronic xarelto.      5.  Back pain, no complaints of pain today. will cont tylenol prn.     Patient reports that she feels ok.  Offers no other complaints              Constitutional: No fever, fatigue or weight loss.  Skin: No rash.  Eyes: No recent vision problems or eye pain.  ENT: No congestion, ear pain, or sore throat.  Endocrine: No thyroid problems.  Cardiovascular: No chest pain or palpation.  Respiratory: No cough, shortness of breath, congestion, or wheezing.  Gastrointestinal: No abdominal pain, nausea, vomiting, or diarrhea.  Genitourinary: No dysuria.  Musculoskeletal: No joint swelling.  Neurologic: No headache.      Vital Signs Last 24 Hrs  T(C): 36.7 (01-19-19 @ 08:04), Max: 36.9 (01-18-19 @ 15:24)  T(F): 98.1 (01-19-19 @ 08:04), Max: 98.4 (01-18-19 @ 15:24)  HR: 69 (01-19-19 @ 08:04) (69 - 77)  BP: 135/71 (01-19-19 @ 08:04) (113/87 - 135/71)  BP(mean): --  RR: 18 (01-19-19 @ 08:04) (16 - 18)  SpO2: 97% (01-19-19 @ 08:04) (95% - 97%)        PHYSICAL EXAM-  GENERAL: NAD, well-groomed, well-developed  HEAD:  Atraumatic, Normocephalic  EYES: EOMI, PERRLA, conjunctiva and sclera clear  NECK: Supple, No JVD, Normal thyroid  NERVOUS SYSTEM:  Alert & Oriented X3, Motor Strength 5/5 B/L upper and lower extremities; DTRs 2+ intact and symmetric  CHEST/LUNG: Clear to percussion bilaterally; No rales, rhonchi, wheezing, or rubs  HEART: Regular rate and rhythm; No murmurs, rubs, or gallops  ABDOMEN: Soft, Nontender, Nondistended; Bowel sounds present  EXTREMITIES:  2+ Peripheral Pulses, No clubbing, cyanosis, or edema  SKIN: No rashes or lesions    Hospital course, and discharge planning were discussed with Patient, and family in great details.  seems to understand, and in agreement

## 2019-01-19 NOTE — DISCHARGE NOTE ADULT - CARE PROVIDER_API CALL
Khloe Varela (MD), Internal Medicine  [address] [place] [address]  Phone: [phone]  Fax: [phone]

## 2019-01-19 NOTE — DISCHARGE NOTE ADULT - SECONDARY DIAGNOSIS.
Essential hypertension Deep vein thrombosis (DVT) of lower extremity, unspecified chronicity, unspecified laterality, unspecified vein

## 2019-01-19 NOTE — DISCHARGE NOTE ADULT - PLAN OF CARE
Continue with current medications transfer to [address] [place] [address] for further medications management Cardiac diet Continue with home medications

## 2019-01-19 NOTE — DISCHARGE NOTE ADULT - MEDICATION SUMMARY - MEDICATIONS TO TAKE
I will START or STAY ON the medications listed below when I get home from the hospital:    aspirin 81 mg oral tablet, chewable  -- 1 tab(s) by mouth once a day  -- Indication: For DVT of axillary vein, chronic left    acetaminophen 325 mg oral tablet  -- 2 tab(s) by mouth every 6 hours, As needed, Mild Pain (1 - 3)  -- Indication: For Pain    Xarelto 20 mg oral tablet  -- 1 tab(s) by mouth once a day (in the evening)  -- Indication: For DVT of axillary vein, chronic left    LORazepam 1 mg oral tablet  -- 1 tab(s) by mouth every 6 hours, As needed, Axniety and agitation  -- Indication: For Anxiety    divalproex sodium 500 mg oral delayed release tablet  -- 1 tab(s) by mouth 2 times a day  -- Indication: For Neurobehavioral disorder    topiramate 25 mg oral tablet  -- 1 tab(s) by mouth 2 times a day  -- Indication: For Neurobehavioral disorder    diphenhydrAMINE 25 mg oral capsule  -- 1 cap(s) by mouth once a day (at bedtime), As needed, Rash and/or Itching  -- Indication: For itch    QUEtiapine 200 mg oral tablet  -- 1 tab(s) by mouth once a day (at bedtime)  -- Indication: For Neurobehavioral disorder    melatonin 5 mg oral tablet  -- 1 tab(s) by mouth once a day (at bedtime)  -- Indication: For insomnia    lactobacillus acidophilus oral capsule  -- 1 tab(s) by mouth 2 times a day  -- Indication: For Prophylactic measure    oxybutynin 5 mg oral tablet  -- 1 tab(s) by mouth 2 times a day  -- Indication: For overactive bladder    Multiple Vitamins oral liquid  -- 1 tab(s) by mouth once a day  -- Indication: For vit    thiamine 100 mg oral tablet  -- 1 tab(s) by mouth once a day  -- Indication: For vit    folic acid 0.8 mg oral tablet  -- 1 tab(s) by mouth once a day  -- Indication: For vit    Vitamin B-12 250 mcg oral tablet  -- 1 tab(s) by mouth once a day  -- Indication: For vit    Vitamin D3 2000 intl units oral capsule  -- 1 cap(s) by mouth once a day  -- Indication: For vit

## 2019-01-19 NOTE — BEHAVIORAL HEALTH ASSESSMENT NOTE - OTHER
unclear at this time deferred at this time unable to articulate constricted unable to ascertain at this time due to limited exam hirsutism limited unable to ascertain at this time due to limited exam as

## 2019-01-19 NOTE — DISCHARGE NOTE ADULT - PATIENT PORTAL LINK FT
You can access the [facility] Patient [facility], offered by [facility], by registering with the following website: [email or web address]

## 2019-01-19 NOTE — CHART NOTE - NSCHARTNOTEFT_GEN_A_CORE
AXIS III/MEDICAL HISTORY (unable to fit in assessment):   gastric bypass in 2002 complicated by stricture, corkscrewed sleeve and chronic leak, revised on 11/28/16 with protracted (70-day) postoperative course complicated by MDR infections, a C-diff infection, respiratory compromise due to plugging/PNA/effusions requiring multiple interventions and a trach, as well as a continued leak requiring a draining (double-pigtail) with stenting performed by GI. 1/30/2018: Ex-lap, resection of EC fistula, SBRx2 with patino handsewn anastomosis, JPx2 (SQ) placement; 2/2018: esophagojejunostomy revision c/b EC fistula now s/p medical optimization of EC fistula and return to OR for EC fistula take down and subsequent revision of her aamir-en-y (1/30); functional biliary obstruction s/p perc cholecystostomy placement; VRE UTI; anemia of chronic disease (Hg 5.2); Obesity; DVT (2013); Diverticulitis; Hypertension; S/P left breast biopsy: 2011; S/P colon resection ‘11; S/P knee surgery: repair 2009, 2011; umbilical hernia ’00; appendectomy ’75; tonsillectomy ’67; chronic enterocutaneous fistula

## 2019-01-20 PROCEDURE — 99222 1ST HOSP IP/OBS MODERATE 55: CPT

## 2019-01-20 PROCEDURE — 99233 SBSQ HOSP IP/OBS HIGH 50: CPT

## 2019-01-20 RX ORDER — DIVALPROEX SODIUM 500 MG/1
500 TABLET, DELAYED RELEASE ORAL
Qty: 0 | Refills: 0 | Status: DISCONTINUED | OUTPATIENT
Start: 2019-01-20 | End: 2019-01-22

## 2019-01-20 RX ADMIN — Medication 25 MILLIGRAM(S): at 20:39

## 2019-01-20 RX ADMIN — Medication 5 MILLIGRAM(S): at 09:03

## 2019-01-20 RX ADMIN — QUETIAPINE FUMARATE 200 MILLIGRAM(S): 200 TABLET, FILM COATED ORAL at 20:40

## 2019-01-20 RX ADMIN — DIVALPROEX SODIUM 500 MILLIGRAM(S): 500 TABLET, DELAYED RELEASE ORAL at 21:29

## 2019-01-20 RX ADMIN — Medication 5 MILLIGRAM(S): at 20:39

## 2019-01-20 RX ADMIN — RIVAROXABAN 20 MILLIGRAM(S): KIT at 19:17

## 2019-01-20 RX ADMIN — Medication 81 MILLIGRAM(S): at 09:02

## 2019-01-20 RX ADMIN — Medication 5 MILLIGRAM(S): at 20:40

## 2019-01-20 NOTE — CONSULT NOTE ADULT - SUBJECTIVE AND OBJECTIVE BOX
CC.  Acute psychosis  HPI.  Patient reports that she feels better.  Offers no other complaints    Constitutional: No fever, fatigue or weight loss.  Skin: No rash.  Eyes: No recent vision problems or eye pain.  ENT: No congestion, ear pain, or sore throat.  Endocrine: No thyroid problems.  Cardiovascular: No chest pain or palpation.  Respiratory: No cough, shortness of breath, congestion, or wheezing.  Gastrointestinal: No abdominal pain, nausea, vomiting, or diarrhea.  Genitourinary: No dysuria.  Musculoskeletal: No joint swelling.  Neurologic: No headache.        Past Medical & Surgical Hx:  PAST MEDICAL & SURGICAL HISTORY:  Fistula of intestine  Washed out  Reflux  Obesity  DVT (deep venous thrombosis): 2013 neck  Diverticulitis  Hypertension  Elective surgery: abodominal wall surgery  dec 2016  Elective surgery: NOV 2016 gastric bypass revision  Gastric bypass status for obesity: gastric sleeve, 6/2012  S/P breast biopsy: 2011, left  S/P colon resection: 2011  S/P knee surgery: repair 2009, 2011  right; left  Umbilical hernia: 2000  S/P appendectomy: 1975  S/P tonsillectomy: 1967      Social History--  EtOH: denies *  Tobacco: denies   Drug Use: 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 5 milliGRAM(s) Oral at bedtime  oxybutynin 5 milliGRAM(s) Oral two times a day  QUEtiapine 200 milliGRAM(s) Oral at bedtime  rivaroxaban 20 milliGRAM(s) Oral every 24 hours  thiamine 100 milliGRAM(s) Oral daily  topiramate 25 milliGRAM(s) Oral two times a day    MEDICATIONS  (PRN):  OLANZapine Disintegrating Tablet 5 milliGRAM(s) Oral every 6 hours PRN agitation  OLANZapine Injectable 5 milliGRAM(s) IntraMuscular every 6 hours PRN severe agitation / aggressive behavior        /82 P 78 RR 16 T97.9                   PHYSICAL EXAM-  GENERAL: NAD, well-groomed, well-developed  HEAD:  Atraumatic, Normocephalic  EYES: EOMI, PERRLA, conjunctiva and sclera clear  NECK: Supple, No JVD, Normal thyroid  NERVOUS SYSTEM:  Alert & Oriented X3, Motor Strength 5/5 B/L upper and lower extremities; DTRs 2+ intact and symmetric  CHEST/LUNG: Clear to percussion bilaterally; No rales, rhonchi, wheezing, or rubs  HEART: Regular rate and rhythm; No murmurs, rubs, or gallops  ABDOMEN: Soft, Nontender, Nondistended; Bowel sounds present  EXTREMITIES:  2+ Peripheral Pulses, No clubbing, cyanosis, or edema  SKIN: No rashes or lesions            Imaging Personally Reviewed:     [x ] YES  [ ] NO    Consultant(s) Notes Reviewed:  [x ] YES  [ ] NO    Care Discussed with Consultants/Other Providers [x ] YES  [ ] NO

## 2019-01-20 NOTE — PROGRESS NOTE BEHAVIORAL HEALTH - OTHER
"why am I here?" constricted at times linear, at times concrete no overt delusions but has some misperceptions at times hirsutism low end of fair - brisk, at times irritable and unpleasant deferred at this time

## 2019-01-20 NOTE — PHYSICAL THERAPY INITIAL EVALUATION ADULT - ADDITIONAL COMMENTS
Pt lives in a house Pt lives in a house with her spouse with 2 steps to enter and 13 steps inside, each with a handrail. Pt states she was independent with all mobility and didn't use an AD.

## 2019-01-20 NOTE — CONSULT NOTE ADULT - ASSESSMENT
Patient is 57 yo female with complexed PMHX as above presenting with     1. Acute Psychosis.  Further care as per psych  2.  HX of DVT.  Continue with rivaroxaban  3. HTN.  Continue with home medications, and Monitor BP  4.  GERD Continue with PPI  Plan of care was discussed with patient,   in great details, All questions were answered to their satisfaction  Seems to understand, and in agreement

## 2019-01-21 PROCEDURE — 99233 SBSQ HOSP IP/OBS HIGH 50: CPT

## 2019-01-21 RX ADMIN — DIVALPROEX SODIUM 500 MILLIGRAM(S): 500 TABLET, DELAYED RELEASE ORAL at 09:54

## 2019-01-21 RX ADMIN — Medication 5 MILLIGRAM(S): at 09:41

## 2019-01-21 RX ADMIN — DIVALPROEX SODIUM 500 MILLIGRAM(S): 500 TABLET, DELAYED RELEASE ORAL at 09:44

## 2019-01-21 RX ADMIN — DIVALPROEX SODIUM 500 MILLIGRAM(S): 500 TABLET, DELAYED RELEASE ORAL at 21:23

## 2019-01-21 RX ADMIN — QUETIAPINE FUMARATE 200 MILLIGRAM(S): 200 TABLET, FILM COATED ORAL at 21:23

## 2019-01-21 RX ADMIN — Medication 5 MILLIGRAM(S): at 21:23

## 2019-01-21 RX ADMIN — Medication 81 MILLIGRAM(S): at 09:41

## 2019-01-21 RX ADMIN — RIVAROXABAN 20 MILLIGRAM(S): KIT at 18:12

## 2019-01-21 RX ADMIN — Medication 100 MILLIGRAM(S): at 09:42

## 2019-01-21 RX ADMIN — Medication 25 MILLIGRAM(S): at 09:41

## 2019-01-21 RX ADMIN — OLANZAPINE 5 MILLIGRAM(S): 15 TABLET, FILM COATED ORAL at 21:36

## 2019-01-21 NOTE — PHYSICAL THERAPY INITIAL EVALUATION ADULT - PERTINENT HX OF CURRENT PROBLEM, REHAB EVAL
Pt admitted to [facility] due to erratic behavior; was originally admitted to [facility] (on 1/10/19) though had to be moved to the psych unit on 1 [place] due to erratic behavior (she was admitted to the psych unit on 1/19/19)
Pt admitted to [facility] due to erratic behavior; was originally admitted to 2 [facility] (on 1/10/19) though had to be moved to the psych unit on 1 [place] due to erratic behavior (she was admitted to the psych unit on 1/19/19)

## 2019-01-21 NOTE — PROGRESS NOTE BEHAVIORAL HEALTH - OTHER
hirsutism low end of fair - brisk, at times irritable and unpleasant but better since admission improving "why am I here?" constricted but reactive at times linear, at times concrete, at times with impaired reasoning no overt delusions but has some misperceptions at times

## 2019-01-21 NOTE — PHYSICAL THERAPY INITIAL EVALUATION ADULT - GENERAL OBSERVATIONS, REHAB EVAL
Pt encountered semi supine in her bed, 1:1 aide at side (providing constant observation)
Pt encountered semi supine in her bed, 1:1 aide at side (providing constant observation)

## 2019-01-21 NOTE — PHYSICAL THERAPY INITIAL EVALUATION ADULT - ADDITIONAL COMMENTS
Pt lives in a house with her spouse with 2 steps to enter and 13 steps inside, each with a handrail. Pt states she was independent with all mobility and didn't use an AD.

## 2019-01-21 NOTE — PROCEDURE NOTE - SUPERVISORY STATEMENT
Robert requesting 90 Day supply of new Ranitidine 150 MG tabs.  Thank you!  
stable post procedure
procedure by myself

## 2019-01-22 PROCEDURE — 99231 SBSQ HOSP IP/OBS SF/LOW 25: CPT

## 2019-01-22 PROCEDURE — 12345: CPT | Mod: NC

## 2019-01-22 RX ORDER — DIVALPROEX SODIUM 500 MG/1
250 TABLET, DELAYED RELEASE ORAL
Qty: 0 | Refills: 0 | Status: DISCONTINUED | OUTPATIENT
Start: 2019-01-22 | End: 2019-01-23

## 2019-01-22 RX ADMIN — Medication 1 TABLET(S): at 16:17

## 2019-01-22 RX ADMIN — DIVALPROEX SODIUM 250 MILLIGRAM(S): 500 TABLET, DELAYED RELEASE ORAL at 20:25

## 2019-01-22 RX ADMIN — Medication 25 MILLIGRAM(S): at 08:53

## 2019-01-22 RX ADMIN — Medication 25 MILLIGRAM(S): at 20:25

## 2019-01-22 RX ADMIN — Medication 1 TABLET(S): at 08:53

## 2019-01-22 RX ADMIN — DIVALPROEX SODIUM 500 MILLIGRAM(S): 500 TABLET, DELAYED RELEASE ORAL at 08:53

## 2019-01-22 RX ADMIN — Medication 100 MILLIGRAM(S): at 08:53

## 2019-01-22 RX ADMIN — Medication 5 MILLIGRAM(S): at 20:26

## 2019-01-22 RX ADMIN — Medication 81 MILLIGRAM(S): at 08:53

## 2019-01-22 RX ADMIN — Medication 5 MILLIGRAM(S): at 08:53

## 2019-01-22 RX ADMIN — QUETIAPINE FUMARATE 200 MILLIGRAM(S): 200 TABLET, FILM COATED ORAL at 20:25

## 2019-01-22 RX ADMIN — RIVAROXABAN 20 MILLIGRAM(S): KIT at 16:17

## 2019-01-22 RX ADMIN — Medication 5 MILLIGRAM(S): at 20:25

## 2019-01-23 PROCEDURE — 12345: CPT | Mod: NC

## 2019-01-23 PROCEDURE — 99231 SBSQ HOSP IP/OBS SF/LOW 25: CPT

## 2019-01-23 RX ORDER — DIVALPROEX SODIUM 500 MG/1
500 TABLET, DELAYED RELEASE ORAL
Qty: 0 | Refills: 0 | Status: DISCONTINUED | OUTPATIENT
Start: 2019-01-23 | End: 2019-01-29

## 2019-01-23 RX ORDER — BACITRACIN ZINC 500 UNIT/G
1 OINTMENT IN PACKET (EA) TOPICAL DAILY
Qty: 0 | Refills: 0 | Status: DISCONTINUED | OUTPATIENT
Start: 2019-01-23 | End: 2019-02-01

## 2019-01-23 RX ADMIN — Medication 25 MILLIGRAM(S): at 20:22

## 2019-01-23 RX ADMIN — Medication 100 MILLIGRAM(S): at 08:49

## 2019-01-23 RX ADMIN — Medication 81 MILLIGRAM(S): at 08:48

## 2019-01-23 RX ADMIN — DIVALPROEX SODIUM 500 MILLIGRAM(S): 500 TABLET, DELAYED RELEASE ORAL at 11:00

## 2019-01-23 RX ADMIN — Medication 5 MILLIGRAM(S): at 08:48

## 2019-01-23 RX ADMIN — QUETIAPINE FUMARATE 200 MILLIGRAM(S): 200 TABLET, FILM COATED ORAL at 20:22

## 2019-01-23 RX ADMIN — Medication 5 MILLIGRAM(S): at 20:22

## 2019-01-23 RX ADMIN — Medication 25 MILLIGRAM(S): at 08:48

## 2019-01-23 RX ADMIN — DIVALPROEX SODIUM 500 MILLIGRAM(S): 500 TABLET, DELAYED RELEASE ORAL at 20:22

## 2019-01-23 RX ADMIN — RIVAROXABAN 20 MILLIGRAM(S): KIT at 18:03

## 2019-01-23 RX ADMIN — Medication 5 MILLIGRAM(S): at 20:21

## 2019-01-24 PROCEDURE — 99231 SBSQ HOSP IP/OBS SF/LOW 25: CPT

## 2019-01-24 RX ADMIN — DIVALPROEX SODIUM 500 MILLIGRAM(S): 500 TABLET, DELAYED RELEASE ORAL at 08:40

## 2019-01-24 RX ADMIN — Medication 25 MILLIGRAM(S): at 20:27

## 2019-01-24 RX ADMIN — QUETIAPINE FUMARATE 200 MILLIGRAM(S): 200 TABLET, FILM COATED ORAL at 20:27

## 2019-01-24 RX ADMIN — Medication 5 MILLIGRAM(S): at 20:27

## 2019-01-24 RX ADMIN — Medication 100 MILLIGRAM(S): at 08:40

## 2019-01-24 RX ADMIN — Medication 5 MILLIGRAM(S): at 08:40

## 2019-01-24 RX ADMIN — DIVALPROEX SODIUM 500 MILLIGRAM(S): 500 TABLET, DELAYED RELEASE ORAL at 20:27

## 2019-01-24 RX ADMIN — Medication 25 MILLIGRAM(S): at 08:40

## 2019-01-24 RX ADMIN — Medication 81 MILLIGRAM(S): at 08:43

## 2019-01-25 PROCEDURE — 99231 SBSQ HOSP IP/OBS SF/LOW 25: CPT

## 2019-01-25 RX ADMIN — Medication 1 TABLET(S): at 09:08

## 2019-01-25 RX ADMIN — QUETIAPINE FUMARATE 200 MILLIGRAM(S): 200 TABLET, FILM COATED ORAL at 20:53

## 2019-01-25 RX ADMIN — Medication 1 APPLICATION(S): at 09:07

## 2019-01-25 RX ADMIN — Medication 5 MILLIGRAM(S): at 09:08

## 2019-01-25 RX ADMIN — Medication 25 MILLIGRAM(S): at 09:08

## 2019-01-25 RX ADMIN — Medication 25 MILLIGRAM(S): at 20:53

## 2019-01-25 RX ADMIN — Medication 100 MILLIGRAM(S): at 09:08

## 2019-01-25 RX ADMIN — Medication 81 MILLIGRAM(S): at 09:07

## 2019-01-25 RX ADMIN — RIVAROXABAN 20 MILLIGRAM(S): KIT at 18:55

## 2019-01-25 RX ADMIN — DIVALPROEX SODIUM 500 MILLIGRAM(S): 500 TABLET, DELAYED RELEASE ORAL at 20:54

## 2019-01-25 RX ADMIN — Medication 5 MILLIGRAM(S): at 20:53

## 2019-01-25 RX ADMIN — Medication 5 MILLIGRAM(S): at 20:52

## 2019-01-25 RX ADMIN — DIVALPROEX SODIUM 500 MILLIGRAM(S): 500 TABLET, DELAYED RELEASE ORAL at 09:08

## 2019-01-25 NOTE — PROGRESS NOTE BEHAVIORAL HEALTH - OTHER
hirsutism low end of fair - brisk, at times irritable and unpleasant but better since admission improving "why am I here?" constricted but reactive at times linear, at times concrete, at times with impaired reasoning. no overt delusions but has some misperceptions at times

## 2019-01-26 PROCEDURE — 99232 SBSQ HOSP IP/OBS MODERATE 35: CPT

## 2019-01-26 RX ADMIN — Medication 5 MILLIGRAM(S): at 20:30

## 2019-01-26 RX ADMIN — DIVALPROEX SODIUM 500 MILLIGRAM(S): 500 TABLET, DELAYED RELEASE ORAL at 08:40

## 2019-01-26 RX ADMIN — Medication 25 MILLIGRAM(S): at 08:40

## 2019-01-26 RX ADMIN — Medication 81 MILLIGRAM(S): at 08:40

## 2019-01-26 RX ADMIN — RIVAROXABAN 20 MILLIGRAM(S): KIT at 16:08

## 2019-01-26 RX ADMIN — Medication 1 TABLET(S): at 16:08

## 2019-01-26 RX ADMIN — Medication 1 TABLET(S): at 08:40

## 2019-01-26 RX ADMIN — Medication 5 MILLIGRAM(S): at 08:40

## 2019-01-26 RX ADMIN — Medication 25 MILLIGRAM(S): at 20:30

## 2019-01-26 RX ADMIN — Medication 100 MILLIGRAM(S): at 08:40

## 2019-01-26 RX ADMIN — Medication 1 APPLICATION(S): at 09:38

## 2019-01-26 RX ADMIN — DIVALPROEX SODIUM 500 MILLIGRAM(S): 500 TABLET, DELAYED RELEASE ORAL at 20:30

## 2019-01-26 RX ADMIN — QUETIAPINE FUMARATE 200 MILLIGRAM(S): 200 TABLET, FILM COATED ORAL at 20:31

## 2019-01-26 NOTE — PROGRESS NOTE BEHAVIORAL HEALTH - OTHER
low end of fair - brisk, at times irritable and unpleasant but better since admission improving "why am I here?" constricted but reactive at times linear, at times concrete, at times with impaired reasoning. no overt delusions but has some misperceptions at times hirsutism

## 2019-01-27 RX ADMIN — DIVALPROEX SODIUM 500 MILLIGRAM(S): 500 TABLET, DELAYED RELEASE ORAL at 20:32

## 2019-01-27 RX ADMIN — Medication 81 MILLIGRAM(S): at 09:21

## 2019-01-27 RX ADMIN — QUETIAPINE FUMARATE 200 MILLIGRAM(S): 200 TABLET, FILM COATED ORAL at 20:32

## 2019-01-27 RX ADMIN — Medication 25 MILLIGRAM(S): at 09:22

## 2019-01-27 RX ADMIN — RIVAROXABAN 20 MILLIGRAM(S): KIT at 16:45

## 2019-01-27 RX ADMIN — Medication 1 APPLICATION(S): at 09:21

## 2019-01-27 RX ADMIN — Medication 25 MILLIGRAM(S): at 20:33

## 2019-01-27 RX ADMIN — Medication 5 MILLIGRAM(S): at 09:21

## 2019-01-27 RX ADMIN — DIVALPROEX SODIUM 500 MILLIGRAM(S): 500 TABLET, DELAYED RELEASE ORAL at 09:21

## 2019-01-27 RX ADMIN — Medication 100 MILLIGRAM(S): at 09:21

## 2019-01-27 RX ADMIN — Medication 5 MILLIGRAM(S): at 20:32

## 2019-01-27 RX ADMIN — Medication 1 TABLET(S): at 16:45

## 2019-01-27 RX ADMIN — Medication 1 TABLET(S): at 09:21

## 2019-01-28 LAB
ALBUMIN SERPL ELPH-MCNC: 2.9 G/DL — LOW (ref 3.3–5)
ALP SERPL-CCNC: 119 U/L — SIGNIFICANT CHANGE UP (ref 30–120)
ALT FLD-CCNC: 89 U/L DA — HIGH (ref 10–60)
AMMONIA BLD-MCNC: 38 UMOL/L — HIGH (ref 11–32)
AST SERPL-CCNC: 29 U/L — SIGNIFICANT CHANGE UP (ref 10–40)
BASOPHILS # BLD AUTO: 0.02 K/UL — SIGNIFICANT CHANGE UP (ref 0–0.2)
BASOPHILS NFR BLD AUTO: 0.4 % — SIGNIFICANT CHANGE UP (ref 0–2)
BILIRUB DIRECT SERPL-MCNC: 0.2 MG/DL — SIGNIFICANT CHANGE UP (ref 0–0.2)
BILIRUB INDIRECT FLD-MCNC: 0.2 MG/DL — SIGNIFICANT CHANGE UP (ref 0.2–1)
BILIRUB SERPL-MCNC: 0.4 MG/DL — SIGNIFICANT CHANGE UP (ref 0.2–1.2)
EOSINOPHIL # BLD AUTO: 0.13 K/UL — SIGNIFICANT CHANGE UP (ref 0–0.5)
EOSINOPHIL NFR BLD AUTO: 2.5 % — SIGNIFICANT CHANGE UP (ref 0–6)
HCT VFR BLD CALC: 38.8 % — SIGNIFICANT CHANGE UP (ref 34.5–45)
HGB BLD-MCNC: 12.4 G/DL — SIGNIFICANT CHANGE UP (ref 11.5–15.5)
IMM GRANULOCYTES NFR BLD AUTO: 1 % — SIGNIFICANT CHANGE UP (ref 0–1.5)
LYMPHOCYTES # BLD AUTO: 1.94 K/UL — SIGNIFICANT CHANGE UP (ref 1–3.3)
LYMPHOCYTES # BLD AUTO: 36.9 % — SIGNIFICANT CHANGE UP (ref 13–44)
MCHC RBC-ENTMCNC: 30 PG — SIGNIFICANT CHANGE UP (ref 27–34)
MCHC RBC-ENTMCNC: 32 GM/DL — SIGNIFICANT CHANGE UP (ref 32–36)
MCV RBC AUTO: 93.9 FL — SIGNIFICANT CHANGE UP (ref 80–100)
MONOCYTES # BLD AUTO: 0.43 K/UL — SIGNIFICANT CHANGE UP (ref 0–0.9)
MONOCYTES NFR BLD AUTO: 8.2 % — SIGNIFICANT CHANGE UP (ref 2–14)
NEUTROPHILS # BLD AUTO: 2.69 K/UL — SIGNIFICANT CHANGE UP (ref 1.8–7.4)
NEUTROPHILS NFR BLD AUTO: 51 % — SIGNIFICANT CHANGE UP (ref 43–77)
NRBC # BLD: 0 /100 WBCS — SIGNIFICANT CHANGE UP (ref 0–0)
PLATELET # BLD AUTO: 163 K/UL — SIGNIFICANT CHANGE UP (ref 150–400)
PROT SERPL-MCNC: 5.6 G/DL — LOW (ref 6–8.3)
RBC # BLD: 4.13 M/UL — SIGNIFICANT CHANGE UP (ref 3.8–5.2)
RBC # FLD: 14.5 % — SIGNIFICANT CHANGE UP (ref 10.3–14.5)
VALPROATE SERPL-MCNC: 39 UG/ML — LOW (ref 50–100)
WBC # BLD: 5.26 K/UL — SIGNIFICANT CHANGE UP (ref 3.8–10.5)
WBC # FLD AUTO: 5.26 K/UL — SIGNIFICANT CHANGE UP (ref 3.8–10.5)

## 2019-01-28 PROCEDURE — 99231 SBSQ HOSP IP/OBS SF/LOW 25: CPT

## 2019-01-28 RX ADMIN — Medication 1 TABLET(S): at 16:25

## 2019-01-28 RX ADMIN — Medication 5 MILLIGRAM(S): at 20:24

## 2019-01-28 RX ADMIN — RIVAROXABAN 20 MILLIGRAM(S): KIT at 16:26

## 2019-01-28 RX ADMIN — Medication 25 MILLIGRAM(S): at 08:45

## 2019-01-28 RX ADMIN — Medication 5 MILLIGRAM(S): at 08:45

## 2019-01-28 RX ADMIN — Medication 81 MILLIGRAM(S): at 08:45

## 2019-01-28 RX ADMIN — Medication 1 TABLET(S): at 08:45

## 2019-01-28 RX ADMIN — QUETIAPINE FUMARATE 200 MILLIGRAM(S): 200 TABLET, FILM COATED ORAL at 20:24

## 2019-01-28 RX ADMIN — Medication 25 MILLIGRAM(S): at 20:24

## 2019-01-28 RX ADMIN — Medication 1 APPLICATION(S): at 08:44

## 2019-01-28 RX ADMIN — DIVALPROEX SODIUM 500 MILLIGRAM(S): 500 TABLET, DELAYED RELEASE ORAL at 20:24

## 2019-01-28 RX ADMIN — Medication 100 MILLIGRAM(S): at 08:45

## 2019-01-28 RX ADMIN — DIVALPROEX SODIUM 500 MILLIGRAM(S): 500 TABLET, DELAYED RELEASE ORAL at 08:45

## 2019-01-29 LAB — HBA1C BLD-MCNC: 4.6 % — SIGNIFICANT CHANGE UP (ref 4–5.6)

## 2019-01-29 PROCEDURE — 99231 SBSQ HOSP IP/OBS SF/LOW 25: CPT

## 2019-01-29 RX ORDER — VALPROIC ACID (AS SODIUM SALT) 250 MG/5ML
500 SOLUTION, ORAL ORAL
Qty: 0 | Refills: 0 | Status: DISCONTINUED | OUTPATIENT
Start: 2019-01-29 | End: 2019-02-01

## 2019-01-29 RX ADMIN — Medication 1 APPLICATION(S): at 09:10

## 2019-01-29 RX ADMIN — Medication 5 MILLIGRAM(S): at 20:12

## 2019-01-29 RX ADMIN — Medication 1 TABLET(S): at 17:18

## 2019-01-29 RX ADMIN — Medication 100 MILLIGRAM(S): at 09:10

## 2019-01-29 RX ADMIN — Medication 1 TABLET(S): at 09:11

## 2019-01-29 RX ADMIN — RIVAROXABAN 20 MILLIGRAM(S): KIT at 17:17

## 2019-01-29 RX ADMIN — Medication 5 MILLIGRAM(S): at 09:10

## 2019-01-29 RX ADMIN — QUETIAPINE FUMARATE 200 MILLIGRAM(S): 200 TABLET, FILM COATED ORAL at 20:12

## 2019-01-29 RX ADMIN — Medication 81 MILLIGRAM(S): at 09:10

## 2019-01-29 RX ADMIN — Medication 25 MILLIGRAM(S): at 20:12

## 2019-01-29 RX ADMIN — Medication 25 MILLIGRAM(S): at 09:10

## 2019-01-29 RX ADMIN — Medication 500 MILLIGRAM(S): at 20:11

## 2019-01-29 RX ADMIN — DIVALPROEX SODIUM 500 MILLIGRAM(S): 500 TABLET, DELAYED RELEASE ORAL at 09:10

## 2019-01-30 LAB — T PALLIDUM AB TITR SER: NEGATIVE — SIGNIFICANT CHANGE UP

## 2019-01-30 PROCEDURE — 99231 SBSQ HOSP IP/OBS SF/LOW 25: CPT

## 2019-01-30 RX ADMIN — Medication 1 APPLICATION(S): at 09:00

## 2019-01-30 RX ADMIN — Medication 1 TABLET(S): at 08:58

## 2019-01-30 RX ADMIN — Medication 500 MILLIGRAM(S): at 08:58

## 2019-01-30 RX ADMIN — Medication 5 MILLIGRAM(S): at 20:31

## 2019-01-30 RX ADMIN — Medication 25 MILLIGRAM(S): at 20:31

## 2019-01-30 RX ADMIN — Medication 5 MILLIGRAM(S): at 08:58

## 2019-01-30 RX ADMIN — Medication 500 MILLIGRAM(S): at 20:32

## 2019-01-30 RX ADMIN — Medication 81 MILLIGRAM(S): at 08:58

## 2019-01-30 RX ADMIN — Medication 1 TABLET(S): at 18:10

## 2019-01-30 RX ADMIN — RIVAROXABAN 20 MILLIGRAM(S): KIT at 18:10

## 2019-01-30 RX ADMIN — Medication 25 MILLIGRAM(S): at 08:58

## 2019-01-30 RX ADMIN — QUETIAPINE FUMARATE 200 MILLIGRAM(S): 200 TABLET, FILM COATED ORAL at 20:31

## 2019-01-30 RX ADMIN — Medication 100 MILLIGRAM(S): at 08:58

## 2019-01-30 NOTE — PROGRESS NOTE BEHAVIORAL HEALTH - ORIENTATION OTHER
unable to ascertain at this time due to limited exam as

## 2019-01-31 PROCEDURE — 99231 SBSQ HOSP IP/OBS SF/LOW 25: CPT

## 2019-01-31 RX ADMIN — Medication 81 MILLIGRAM(S): at 08:14

## 2019-01-31 RX ADMIN — Medication 5 MILLIGRAM(S): at 20:16

## 2019-01-31 RX ADMIN — QUETIAPINE FUMARATE 200 MILLIGRAM(S): 200 TABLET, FILM COATED ORAL at 20:16

## 2019-01-31 RX ADMIN — Medication 500 MILLIGRAM(S): at 20:16

## 2019-01-31 RX ADMIN — Medication 25 MILLIGRAM(S): at 08:14

## 2019-01-31 RX ADMIN — Medication 100 MILLIGRAM(S): at 08:15

## 2019-01-31 RX ADMIN — Medication 25 MILLIGRAM(S): at 20:16

## 2019-01-31 RX ADMIN — Medication 1 TABLET(S): at 08:14

## 2019-01-31 RX ADMIN — Medication 1 TABLET(S): at 18:34

## 2019-01-31 RX ADMIN — Medication 5 MILLIGRAM(S): at 08:15

## 2019-01-31 RX ADMIN — Medication 500 MILLIGRAM(S): at 08:14

## 2019-01-31 RX ADMIN — Medication 1 APPLICATION(S): at 08:15

## 2019-01-31 RX ADMIN — RIVAROXABAN 20 MILLIGRAM(S): KIT at 18:32

## 2019-01-31 NOTE — PROGRESS NOTE BEHAVIORAL HEALTH - OTHER
Hirsutism low end of fair - brisk, at times irritable and unpleasant but better since admission at times linear, at times concrete, at times with impaired reasoning. no overt delusions but has some misperceptions at times improving-Much better now "why am I here?" constricted but reactive

## 2019-02-01 ENCOUNTER — TRANSCRIPTION ENCOUNTER (OUTPATIENT)
Age: 59
End: 2019-02-01

## 2019-02-01 LAB
ALBUMIN SERPL ELPH-MCNC: 2.8 G/DL — LOW (ref 3.3–5)
ALP SERPL-CCNC: 100 U/L — SIGNIFICANT CHANGE UP (ref 30–120)
ALT FLD-CCNC: 45 U/L DA — SIGNIFICANT CHANGE UP (ref 10–60)
AST SERPL-CCNC: 15 U/L — SIGNIFICANT CHANGE UP (ref 10–40)
BASOPHILS # BLD AUTO: 0.03 K/UL — SIGNIFICANT CHANGE UP (ref 0–0.2)
BASOPHILS NFR BLD AUTO: 0.6 % — SIGNIFICANT CHANGE UP (ref 0–2)
BILIRUB DIRECT SERPL-MCNC: 0.2 MG/DL — SIGNIFICANT CHANGE UP (ref 0–0.2)
BILIRUB INDIRECT FLD-MCNC: 0.2 MG/DL — SIGNIFICANT CHANGE UP (ref 0.2–1)
BILIRUB SERPL-MCNC: 0.4 MG/DL — SIGNIFICANT CHANGE UP (ref 0.2–1.2)
EOSINOPHIL # BLD AUTO: 0.1 K/UL — SIGNIFICANT CHANGE UP (ref 0–0.5)
EOSINOPHIL NFR BLD AUTO: 2.1 % — SIGNIFICANT CHANGE UP (ref 0–6)
HCT VFR BLD CALC: 39.7 % — SIGNIFICANT CHANGE UP (ref 34.5–45)
HGB BLD-MCNC: 12.4 G/DL — SIGNIFICANT CHANGE UP (ref 11.5–15.5)
IMM GRANULOCYTES NFR BLD AUTO: 0.4 % — SIGNIFICANT CHANGE UP (ref 0–1.5)
LYMPHOCYTES # BLD AUTO: 1.76 K/UL — SIGNIFICANT CHANGE UP (ref 1–3.3)
LYMPHOCYTES # BLD AUTO: 36.6 % — SIGNIFICANT CHANGE UP (ref 13–44)
MCHC RBC-ENTMCNC: 29.3 PG — SIGNIFICANT CHANGE UP (ref 27–34)
MCHC RBC-ENTMCNC: 31.2 GM/DL — LOW (ref 32–36)
MCV RBC AUTO: 93.9 FL — SIGNIFICANT CHANGE UP (ref 80–100)
MONOCYTES # BLD AUTO: 0.36 K/UL — SIGNIFICANT CHANGE UP (ref 0–0.9)
MONOCYTES NFR BLD AUTO: 7.5 % — SIGNIFICANT CHANGE UP (ref 2–14)
NEUTROPHILS # BLD AUTO: 2.54 K/UL — SIGNIFICANT CHANGE UP (ref 1.8–7.4)
NEUTROPHILS NFR BLD AUTO: 52.8 % — SIGNIFICANT CHANGE UP (ref 43–77)
NRBC # BLD: 0 /100 WBCS — SIGNIFICANT CHANGE UP (ref 0–0)
PLATELET # BLD AUTO: 147 K/UL — LOW (ref 150–400)
PROT SERPL-MCNC: 5.8 G/DL — LOW (ref 6–8.3)
RBC # BLD: 4.23 M/UL — SIGNIFICANT CHANGE UP (ref 3.8–5.2)
RBC # FLD: 15 % — HIGH (ref 10.3–14.5)
VALPROATE SERPL-MCNC: 41 UG/ML — LOW (ref 50–100)
WBC # BLD: 4.81 K/UL — SIGNIFICANT CHANGE UP (ref 3.8–10.5)
WBC # FLD AUTO: 4.81 K/UL — SIGNIFICANT CHANGE UP (ref 3.8–10.5)

## 2019-02-01 PROCEDURE — 80076 HEPATIC FUNCTION PANEL: CPT

## 2019-02-01 PROCEDURE — 36415 COLL VENOUS BLD VENIPUNCTURE: CPT

## 2019-02-01 PROCEDURE — 97161 PT EVAL LOW COMPLEX 20 MIN: CPT

## 2019-02-01 PROCEDURE — 83036 HEMOGLOBIN GLYCOSYLATED A1C: CPT

## 2019-02-01 PROCEDURE — 99231 SBSQ HOSP IP/OBS SF/LOW 25: CPT

## 2019-02-01 PROCEDURE — 97110 THERAPEUTIC EXERCISES: CPT

## 2019-02-01 PROCEDURE — 99232 SBSQ HOSP IP/OBS MODERATE 35: CPT

## 2019-02-01 PROCEDURE — 86780 TREPONEMA PALLIDUM: CPT

## 2019-02-01 PROCEDURE — 80164 ASSAY DIPROPYLACETIC ACD TOT: CPT

## 2019-02-01 PROCEDURE — 82140 ASSAY OF AMMONIA: CPT

## 2019-02-01 PROCEDURE — 97116 GAIT TRAINING THERAPY: CPT

## 2019-02-01 PROCEDURE — 82962 GLUCOSE BLOOD TEST: CPT

## 2019-02-01 PROCEDURE — 85027 COMPLETE CBC AUTOMATED: CPT

## 2019-02-01 RX ORDER — FOLIC ACID 0.8 MG
1 TABLET ORAL
Qty: 0 | Refills: 0 | COMMUNITY

## 2019-02-01 RX ORDER — CHOLECALCIFEROL (VITAMIN D3) 125 MCG
1 CAPSULE ORAL
Qty: 30 | Refills: 0
Start: 2019-02-01 | End: 2019-03-02

## 2019-02-01 RX ORDER — RIVAROXABAN 15 MG-20MG
1 KIT ORAL
Qty: 0 | Refills: 0 | COMMUNITY

## 2019-02-01 RX ORDER — THIAMINE MONONITRATE (VIT B1) 100 MG
1 TABLET ORAL
Qty: 30 | Refills: 0
Start: 2019-02-01 | End: 2019-03-02

## 2019-02-01 RX ORDER — LACTULOSE 10 G/15ML
20 SOLUTION ORAL DAILY
Qty: 0 | Refills: 0 | Status: DISCONTINUED | OUTPATIENT
Start: 2019-02-01 | End: 2019-02-01

## 2019-02-01 RX ORDER — CHOLECALCIFEROL (VITAMIN D3) 125 MCG
1 CAPSULE ORAL
Qty: 0 | Refills: 0 | COMMUNITY

## 2019-02-01 RX ORDER — ASPIRIN/CALCIUM CARB/MAGNESIUM 324 MG
1 TABLET ORAL
Qty: 0 | Refills: 0 | COMMUNITY

## 2019-02-01 RX ORDER — ASPIRIN/CALCIUM CARB/MAGNESIUM 324 MG
1 TABLET ORAL
Qty: 30 | Refills: 0
Start: 2019-02-01 | End: 2019-03-02

## 2019-02-01 RX ORDER — QUETIAPINE FUMARATE 200 MG/1
1 TABLET, FILM COATED ORAL
Qty: 30 | Refills: 0
Start: 2019-02-01 | End: 2019-03-02

## 2019-02-01 RX ORDER — FOLIC ACID 0.8 MG
1 TABLET ORAL
Qty: 30 | Refills: 0
Start: 2019-02-01 | End: 2019-03-02

## 2019-02-01 RX ORDER — DIVALPROEX SODIUM 500 MG/1
1 TABLET, DELAYED RELEASE ORAL
Qty: 60 | Refills: 0
Start: 2019-02-01 | End: 2019-03-02

## 2019-02-01 RX ORDER — OXYBUTYNIN CHLORIDE 5 MG
1 TABLET ORAL
Qty: 60 | Refills: 0
Start: 2019-02-01 | End: 2019-03-02

## 2019-02-01 RX ORDER — LACTOBACILLUS ACIDOPHILUS 100MM CELL
1 CAPSULE ORAL
Qty: 60 | Refills: 0
Start: 2019-02-01 | End: 2019-03-02

## 2019-02-01 RX ORDER — RIVAROXABAN 15 MG-20MG
1 KIT ORAL
Qty: 30 | Refills: 0
Start: 2019-02-01 | End: 2019-03-02

## 2019-02-01 RX ORDER — TOPIRAMATE 25 MG
1 TABLET ORAL
Qty: 60 | Refills: 0
Start: 2019-02-01 | End: 2019-03-02

## 2019-02-01 RX ORDER — PREGABALIN 225 MG/1
1 CAPSULE ORAL
Qty: 0 | Refills: 0 | COMMUNITY

## 2019-02-01 RX ORDER — PREGABALIN 225 MG/1
1 CAPSULE ORAL
Qty: 30 | Refills: 0
Start: 2019-02-01 | End: 2019-03-02

## 2019-02-01 RX ORDER — DIVALPROEX SODIUM 500 MG/1
500 TABLET, DELAYED RELEASE ORAL
Qty: 0 | Refills: 0 | Status: DISCONTINUED | OUTPATIENT
Start: 2019-02-01 | End: 2019-02-01

## 2019-02-01 RX ADMIN — Medication 25 MILLIGRAM(S): at 08:39

## 2019-02-01 RX ADMIN — Medication 1 APPLICATION(S): at 08:38

## 2019-02-01 RX ADMIN — Medication 100 MILLIGRAM(S): at 08:39

## 2019-02-01 RX ADMIN — Medication 1 TABLET(S): at 08:39

## 2019-02-01 RX ADMIN — Medication 81 MILLIGRAM(S): at 08:38

## 2019-02-01 RX ADMIN — Medication 5 MILLIGRAM(S): at 08:39

## 2019-02-01 RX ADMIN — Medication 500 MILLIGRAM(S): at 08:39

## 2019-02-01 NOTE — PROGRESS NOTE BEHAVIORAL HEALTH - NS ED BHA MED ROS GASTROINTESTINAL
Yes
No complaints
Yes
No complaints

## 2019-02-01 NOTE — PROGRESS NOTE BEHAVIORAL HEALTH - NSBHFUPINTERVALHXFT_PSY_A_CORE
Appreciate PT consultation and recommendations noted including that patient refused to complete assessment. NO behavioral incidents thus far. She refused some medications this morning.
Patient awake, alert, up and about the Unit, walking unassisted, engaging in activities and talking with peers. NOted lapses in memory - repeats same questions within short time period, asking random unrelated things ("I have an MRI tomorrow..can you find me chocolate pudding?" Patient says she does not understand why she is here as she "only walked to [facility]." Not depressed or suicidal. Taking her medications.
Patient a 59 y/o  female, domiciled with her , admitted involuntarily, for mood/impulsive behavior. Patient is compliant with her meds, and seems to be in a relatively better mood control with no agitation or annoyance. LFT/Ammonia/Valproic Acid levels ordered for Monday. Overall improved, but needs more time for further stability and meds adjustment. She remains fixated on leaving early, her usual comments daily when she sees the writer. No PRNs needed for 48 hours. Continue current meds
Patient a 59 y/o  female, domiciled with her , admitted involuntarily, for mood/impulsive behavior. Patient is compliant with her meds, endorsing today that she feels that the Depakote is working and has no night moran now, she believes her night mare from valium. She was advised to take meds for the next few days so she would be stable and can move on with her family suitably. She agreed to take Depakote for a long time. She remains fixated on her discharge as she feels that she has more things to do at home.
Patient in bed, on 1:1 for self mutilation, alert, oriented to time and place, endorsing that she has a test with her surgeon and thus need to leave today/tomorrow. She was told that we can talk with her surgeon and reschedule, then she started that she is staying here for no reason, and is her first Psychiatric admission, and she started her history that she retired as a  and would like to leave, on Friday, we informed her that she has to stay the weekend, she reiterated that she might leave in the middle of the week. She later agreed to take Depakote sprinkles 250 mg BID AM/PM. Writer to speak with her  later.
Patient in bed, was on 1:1 , refused Depakote today AM, writer spoke with her and endorsed that if she refuses to take Depakote, her discharge planning would be delayed. She took Depakote later, and Depakote was increased to Depakote  mg BID. She continues to remain fixated on her discharge, going home and as she has appointment with her surgeon. 1:1 was discontinued, she was suggested alternate methods, not to bite herself, as it looks bad on her part to have forearm with different skin color.
Patient remains dramatic as always, wearing a wig to cover her head, and endorsed that she has to leave as she has a family death and has to attend the wake. She reports being compliant with meds, but her Valproic Acid level is 34 day. Her S. Protein/Albumin levels are low. other chemistry is OK. NO acute events overnight reported./ To be discharged on Thursday with appropriate f/u appointments.
Patient was seen today AM, discussed with team and was in the day room coloring herself with markers. her  to have a procedure so he won't be able to pick her up on Thursday, and was advised to pick her up on Friday. No confirmation yet. She was given Valproic Acid Liquid and to order labs for valproic acid level to check for meds compliance. Not S/h and seems fixated ob her discharge.
Patient was seen today AM, discussed with team and was in the day room coloring herself with markers. Patient is trying to reiterate that she is leaving tomorrow. Advised to have a repeat Depakote Level in AM. Mood in control, with no adverse events overnight or now. Not Intrusive or hyper-verbal any more, and was mentioned to take Depakote for safety with other meds for stability.
 today; Patient awake, alert, up and about the Unit, walking unassisted, engaging in activities and talking with peers.  comes to visit daily and visits seem to be going well. Patient continues to ask for discharge daily with continued limited insight into the reason for her admission. continued noted lapses in memory - repeats same questions within short time period, asking random unrelated things.  Not depressed or suicidal. Taking her medications with continued staff encouragement.
Patient was with her , mood is OK, but continues to remain fixated on her discharge. Depakote level is 34, so Liquid Depakote ordered. Less Hyper-verbal, but continues top remain fixated on discharge. her  has some paper work for the team to see.
Patient was seen today AM, discussed with team and was in the day room coloring herself with markers. Patient did her labs today AM, all Labs are WNL. Depakote levels are 41, previous level are 39. As per nursing she is compliant with her meds. No aggression or agitation reported, no SIB noted. To be discharged after her  comes in. Meds to be sent in to pharmacy once everything in place.

## 2019-02-01 NOTE — PROGRESS NOTE BEHAVIORAL HEALTH - NSBHCHARTREVIEWLAB_PSY_A_CORE FT
Valproic Acid level--41  Decreased Albumin/Protein  Repeat Valproic Acid Level/CBC/LFT--Done WNL
Valproic Acid level--34  Decreased Albumin/Protein  Repeat Valproic Acid Level/CBC/LFT in AM
Valproic Acid level--34  Decreased Albumin/Protein
WNL-All labs done are WNL  Valproic Acid level-67
WNL-All labs done are WNL--further labs on Monday 01/28/2019  Valproic Acid level-67
Valproic Acid level--34  Decreased Albumin/Protein
Valproic Acid level--34  Decreased Albumin/Protein

## 2019-02-01 NOTE — PROGRESS NOTE BEHAVIORAL HEALTH - AXIS III
see separate note (unable to fit)

## 2019-02-01 NOTE — PROGRESS NOTE BEHAVIORAL HEALTH - NSBHADMITIPOBSFT_PSY_A_CORE
Routine Observation
self injurious, impulsive
Routine Observation
self injurious, impulsive
self injurious, impulsive
Routine Observation
As Per Routine
As per routine
Routine Care
Routine plan

## 2019-02-01 NOTE — DISCHARGE NOTE ADULT - PATIENT PORTAL LINK FT
You can access the "[employer] Patient Portal, offered by [facility], by registering with the following website: [email or web address]

## 2019-02-01 NOTE — PROGRESS NOTE BEHAVIORAL HEALTH - NSBHFUPINTERVALCCFT_PSY_A_CORE
" I am leaving on Friday, I have my rights, or I will sign myself out "
" I want to leave as I have a wake to attend "
" I want to leave, and I have my rights to leave "
" I want to leave, can I leave on Sunday "
' Are you Dr. WARNER, the person I am looking for "
" I can go home on Friday "
" I am going home on Friday "
" I want to go home on Thursday "
" patient ready to leave today and awaiting for her  "

## 2019-02-01 NOTE — DISCHARGE NOTE ADULT - COMMUNITY RESOURCES
[facility]'s behavioral health walk-in crisis center, located at [address] [place] [address], [place] [address], phone [phone]

## 2019-02-01 NOTE — PROGRESS NOTE ADULT - ASSESSMENT
58 female admitted with     1. dementia with behavioral disturbance: mgmt per psych team.    2. s/p gastric bypass: vitamin supplements.    3. elevated ammonia level without overt clinical signs of hepatic encephalopathy: lactulose prn.    4. ambulatory    5. pls call Hospitalist team prn  d/w ROM De Jesus.

## 2019-02-01 NOTE — DISCHARGE NOTE ADULT - HOSPITAL COURSE
Patient was admitted Involuntarily from the medical floor at [facility], due to her issues. She had an arduous complicated G. Bypass/Bariatric Surgery, eventually leading to  Total Gastrectomy. As per the patient she had hx of anxiety, managed by her PMD with Lexapro 20 mg daily, no prior Psychiatric Hospitalization and was working as a  for 19 years before she was retired.    Medically has Total gastrectomy, Diverticulitis, multiple DVTs, intestinal fistula, obesity, HTN, and a recent unspecified CVA, who was BIB her  from home on 1/10/19 for progressively declining functional behavior and disorientation, of unknown origin, but today including patient making a passive statement "I wish I was dead "    As per initial consultation and collateral obtained from her : He endorses that patient has not been at baseline for the last 6.5 years since having a bariatric surgery, noting that she has been steadily declining for the last 6 months especially.  At baseline patient is anxious, but had been well functioning prior to this change, working previously as a  and having an honors degree from [employer]. She has never manifest any significant Psycho-Pathlogy, including any periods of significant depression, hypomania/aden, psychosis, OCD, or having a history of trauma. She is an anxious woman, but this has been moderate in intensity and stabilized with Lexapro 20 mg for 20 years. More recently, she has shown ongoing decline manifest as poor hygiene (wearing diapers and does not change herself, cutting the soiled portion out with scissors and/or spending several days soiled), with poor sleeping habits sleeping 3 hours a night (without any associated manic symptoms such as rapid pressured speech, racing thoughts), poor eating habits (hoarding food since her initial bariatric surgery), and she has presented increasingly disorganized since her discharged from [facility] in February 2018, where she had been for a near 7 month medical hospitalization. Psychiatry was consulted during this admission and did not conclude any acute psychopathology.  She has also been calling family members promising them elaborate trips and concerts for the last 6 months. At this time patient has had changes in functioning including, for the last three weeks the patient has been attempting to steal the car to spend beyond her means. Last week the patient went to the bank and opened a new debit card then proceeded to spend $800 at the local [employer].  She then took a cab to a local [employer] store where she attempted to buy a 5 karat ring, but the cab company contacted her  and informed him of the patients presents and failure to pay her cab fare. At this time the patient continuously attempts to leave the home unattended earlier she ran out the home this morning to buy press on nails from [employer] when the collateral when to go find the patient she began arguing with collateral and reported “ I rather not live like this...I may as well be dead” at which time collateral presented to the ED with patient out of concern that "she may hurt herself."  During her time in the Emergency Department, she has consistently denied any thoughts of self-harm or suicide. Collateral denies concern for violence, endorses concern for patient safety/ suicidal statement made today. He reports that he and his adult children have been considering adult long term care for the patient as this  presentation has been ongoing for years. Collateral feels that the patient attempting to leave the home and drive which she has been instructed not to do by doctors for the last month makes the patient unsafe to be at home alone. He is ambivalent if the patient warrants a psychiatric admission and is in agreement with medical admission and work up.       She was admitted to [facility] psychiatric service on 01/19/2019 and was started on the same meds she was receiving in the medical floors. She was compliant with her meds, except the Depakote which she refused, later after much careful planning she started to take the Depakote 500 mg BID. Her Depakote levels on 01/18/2019 was 67. Initially on presentation to the unit she was Hyper-verbal, needed re-direction, was biting her left forearm multiple time leading to bruising in different stages of healing. She was advised to continue the [id number] or else she would not be able to have a smooth discharge and with day-to-day  she started to take Depakote regularly. Depakote levels done after admission was 39, so opted to give her Depakene, and levels was done after more than 5 days and her current Depakote levels was 41. Even her Depakote levels was 41, she seems to be in control with no aggression or agitation or any SIB. Since she came here her SIB stopped with in 2-3 days, a bandage was put on daily with Bacitracin and her hand looks relatively better. She categorically denies that she is not S/H at this amrita and would not do anything to hurt her family. She had good sleep/appetite. No drug abuse was involved, and medically she is stable.

## 2019-02-01 NOTE — PROGRESS NOTE BEHAVIORAL HEALTH - PRIMARY DX
Neurobehavioral disorder

## 2019-02-01 NOTE — PROGRESS NOTE BEHAVIORAL HEALTH - OTHER
Hirsutism low end of fair - brisk, at times irritable and unpleasant but better since admission Improving-Much better now "why am I here?" constricted but reactive

## 2019-02-01 NOTE — PROGRESS NOTE BEHAVIORAL HEALTH - RISK ASSESSMENT
At this time, patient continues to manifest a potential imminent risk to herself given her recent behavior. She requires ongoing monitoring in an acute medical setting.
Patient was hyper-verbal, impulsive and talkative and needed re-direction and also her  was not able to control at home because of her impulsivity. She is compliant with meds, looks better, able to have reasonable conversation, with no aggression or agitation or needing re-direction. She is compliant with meds, to repeat Depakote level in AM tomorrow and her  is aware of her recent developments, and seems OK with her current status. To be discharged and to F/U as per SW referral with meds as given now. She did not get any PRN meds for more than a week.
At this time, patient continues to manifest a potential imminent risk to herself given her recent behavior. She requires ongoing monitoring in an acute medical setting.
Patient was hyper-verbal, impulsive and talkative and needed re-direction and also her  was not able to control at home because of her impulsivity. She is compliant with meds, looks better, able to have reasonable conversation, with no aggression or agitation or needing re-direction. She is compliant with meds, to repeat Depakote level in AM tomorrow and her  is aware of her recent developments, and seems OK with her current status. To be discharged and to F/U as per SW referral with meds as given now. She did not get any PRN meds for more than a week.

## 2019-02-01 NOTE — PROGRESS NOTE BEHAVIORAL HEALTH - NSBHADMITIPOBS_PSY_A_CORE
Routine observation
Constant observation
Routine observation
Constant observation
Constant observation
Routine observation

## 2019-02-01 NOTE — PROGRESS NOTE BEHAVIORAL HEALTH - NSBHADMITIPREASON_PSY_A_CORE
other reason
Danger to self; mental illness expected to respond to inpatient care
Danger to self; mental illness expected to respond to inpatient care
other reason
Danger to self; mental illness expected to respond to inpatient care
Danger to self; mental illness expected to respond to inpatient care
other reason
Danger to self; mental illness expected to respond to inpatient care
other reason
Danger to self; mental illness expected to respond to inpatient care

## 2019-02-01 NOTE — PROGRESS NOTE BEHAVIORAL HEALTH - NSBHPTASSESSDT_PSY_A_CORE
20-Jan-2019
21-Jan-2019
22-Jan-2019 13:07
23-Jan-2019 14:08
24-Jan-2019 14:10
25-Jan-2019 14:59
28-Jan-2019 15:47
30-Jan-2019
31-Jan-2019 11:13
26-Jan-2019
29-Jan-2019 14:38
01-Feb-2019 13:55

## 2019-02-01 NOTE — DISCHARGE NOTE ADULT - CARE PROVIDER_API CALL
Jelena MARIE  [facility]  [address] [place] [id number]  Phone: [phone]  Fax: [phone] Dr. Suarez/Antonio,   [facility]  [address]; [place]; [address]  02/07/2019 @ 8:30AM  Phone: [phone]  Fax: (       -

## 2019-02-01 NOTE — PROGRESS NOTE BEHAVIORAL HEALTH - RELATEDNESS
Fair/Other
Other/Fair
Fair/Other
Fair/Other
Other/Fair
Fair/Other
Other

## 2019-02-01 NOTE — PROGRESS NOTE BEHAVIORAL HEALTH - AFFECT CONGRUENCE
Congruent
Not congruent
Congruent

## 2019-02-01 NOTE — PROGRESS NOTE BEHAVIORAL HEALTH - GAIT / STATION
Normal gait / station
Other

## 2019-02-01 NOTE — PROGRESS NOTE BEHAVIORAL HEALTH - NSBHADMITMEDEDUDETAILS_A_CORE FT
Discussed risks/benefits
Discussed risks/benefits
Discussed Risks/Benefits/Alternatives
continue current regimen
Discussed Risks/Benefits/Alternatives
continue current regimen
continue current regimen; LFT/Ammonia/Valproic Acid levels ordered for Monday
Discussed risks/benefits

## 2019-02-01 NOTE — PROGRESS NOTE BEHAVIORAL HEALTH - AFFECT RANGE
Constricted

## 2019-02-01 NOTE — DISCHARGE NOTE ADULT - CARE PLAN
Principal Discharge DX:	Neurobehavioral disorder  Goal:	Remission/Stabilization  Assessment and plan of treatment:	Patient is stable now, and as per plan to continue with meds as ordered

## 2019-02-01 NOTE — DISCHARGE NOTE ADULT - MEDICATION SUMMARY - MEDICATIONS TO STOP TAKING
I will STOP taking the medications listed below when I get home from the hospital:    LORazepam 1 mg oral tablet  -- 1 tab(s) by mouth every 6 hours, As needed, Axniety and agitation    diphenhydrAMINE 25 mg oral capsule  -- 1 cap(s) by mouth once a day (at bedtime), As needed, Rash and/or Itching

## 2019-02-01 NOTE — PROGRESS NOTE BEHAVIORAL HEALTH - IMPULSE CONTROL
Impaired/Other
Other/Impaired
Impaired/Other
Other/Impaired
Impaired/Other
Impaired/Other
Other/Impaired
Impaired

## 2019-02-01 NOTE — DISCHARGE NOTE ADULT - PROVIDER TOKENS
FREE:[LAST:[E.],FIRST:[Jelena],PHONE:[phone]],FAX:[phone]],ADDRESS:[[facility] [place] [address]]] FREE:[LAST:[Dr. Suarez/Antonio],PHONE:[phone]],FAX:[phone]   [phone]    -],ADDRESS:[[facility]  [address] [place] [address]; [id number]  02/07/2019 @ 8:30AM]]

## 2019-02-01 NOTE — PROGRESS NOTE BEHAVIORAL HEALTH - MOOD
Normal/Other
Other
Normal/Other
Other/Normal
Normal/Other
Normal/Other
Other/Normal
Normal/Other
Normal/Other

## 2019-02-01 NOTE — DISCHARGE NOTE ADULT - MEDICATION SUMMARY - MEDICATIONS TO TAKE
I will START or STAY ON the medications listed below when I get home from the hospital:    aspirin 81 mg oral tablet, chewable  -- 1 tab(s) by mouth once a day  -- Indication: For CVA    rivaroxaban 20 mg oral tablet  -- 1 tab(s) by mouth every 24 hours  -- Indication: For Anti-Coag    divalproex sodium 500 mg oral delayed release tablet  -- 1 tab(s) by mouth 2 times a day  -- Indication: For Seizures/Mood    topiramate 25 mg oral tablet  -- 1 tab(s) by mouth 2 times a day  -- Indication: For Seizures/Mood    QUEtiapine 200 mg oral tablet  -- 1 tab(s) by mouth once a day (at bedtime)  -- Indication: For Mood    lactobacillus acidophilus oral capsule  -- 1 cap(s) by mouth 2 times a day   -- Indication: For Gut Lisa    oxybutynin 5 mg oral tablet  -- 1 tab(s) by mouth 2 times a day  -- Indication: For U.Incontinence    thiamine 100 mg oral tablet  -- 1 tab(s) by mouth once a day  -- Indication: For Supplement    Vitamin D3 2000 intl units oral capsule  -- 1 cap(s) by mouth once a day  -- Indication: For Supplement    folic acid 0.8 mg oral tablet  -- 1 tab(s) by mouth once a day  -- Indication: For Supplement    Vitamin B-12 250 mcg oral tablet  -- 1 tab(s) by mouth once a day  -- Indication: For Supplement

## 2019-02-01 NOTE — PROGRESS NOTE BEHAVIORAL HEALTH - BEHAVIOR
Cooperative
Uncooperative

## 2019-02-01 NOTE — PROGRESS NOTE BEHAVIORAL HEALTH - NSBHLEGALSTATUS_PSY_A_CORE
9.27 (2PC)

## 2019-02-01 NOTE — PROGRESS NOTE BEHAVIORAL HEALTH - THOUGHT PROCESS
Impaired reasoning/Other
Other/Impaired reasoning
Impaired reasoning/Other
Other/Impaired reasoning
Other/Impaired reasoning
Linear
Impaired reasoning/Other
Other/Impaired reasoning

## 2019-02-01 NOTE — PROGRESS NOTE ADULT - SUBJECTIVE AND OBJECTIVE BOX
Patient is a 58y old  Female who presents with a chief complaint of acute psychosis (20 Jan 2019 16:14)      INTERVAL History of Present Illness/OVERNIGHT EVENTS: no new issues per PSYCH team.   refused to take her home today.    seen in day room eating lunch.  no constipation.    MEDICATIONS  (STANDING):  aspirin  chewable 81 milliGRAM(s) Oral daily  BACItracin   Ointment 1 Application(s) Topical daily  lactobacillus acidophilus 1 Tablet(s) Oral two times a day with meals  lactulose Syrup 20 Gram(s) Oral daily  melatonin. 5 milliGRAM(s) Oral at bedtime  oxybutynin 5 milliGRAM(s) Oral two times a day  QUEtiapine 200 milliGRAM(s) Oral at bedtime  rivaroxaban 20 milliGRAM(s) Oral every 24 hours  thiamine 100 milliGRAM(s) Oral daily  topiramate 25 milliGRAM(s) Oral two times a day  valproic  acid Syrup 500 milliGRAM(s) Oral two times a day    MEDICATIONS  (PRN):  OLANZapine Disintegrating Tablet 5 milliGRAM(s) Oral every 6 hours PRN agitation  OLANZapine Injectable 5 milliGRAM(s) IntraMuscular every 6 hours PRN severe agitation / aggressive behavior      Allergies    sulfa drugs (Unknown)  sulfamethoxazole (Other)    Intolerances        REVIEW OF SYSTEMS:  Negative unless otherwise specified above.    Vital Signs Last 24 Hrs  T(C): --  T(F): --  HR: --  BP: --  BP(mean): --  RR: --  SpO2: --        PHYSICAL EXAM:  GENERAL: No apparent distress  HEAD:  Atraumatic, Normocephalic  EYES: conjunctiva and sclera clear  ENMT: Moist mucous membranes  NECK: Supple  CHEST/LUNG: Clear to auscultation bilaterally  HEART: Regular rate and rhythm  ABDOMEN: Soft, Nontender, Nondistended; Bowel sounds present  EXTREMITIES:  No clubbing, cyanosis or edema  SKIN: No rashes or lesions  NERVOUS SYSTEM:  Alert & Oriented X2; Bilateral Lower extremity mobile, sensation to light touch intact      LABS:                        12.4   4.81  )-----------( 147      ( 01 Feb 2019 06:39 )             39.7         TPro  5.8    /  Alb  2.8    /  TBili  0.4    /  DBili  0.2    /  AST  15     /  ALT  45     /  AlkPhos  100    01 Feb 2019 06:39        CAPILLARY BLOOD GLUCOSE          RADIOLOGY & ADDITIONAL TESTS:    Images reviewed personally    Consultant Notes Reviewed and Care Discussed with relevant Consultants/Other Providers.

## 2019-02-01 NOTE — DISCHARGE NOTE ADULT - ADDITIONAL INSTRUCTIONS
Wednesday, 02/06/2019 @ 12:45 PM: Intake appointment with Jelena MARIE at [facility], located at [address] [place] [address], [place] [address], phone [phone]. Wednesday, 02/07/2019 @ 8.30 AM: Intake appointment with Dr. Suarez/Antonio, at [facility]; Out-patient Services located at [address] [place] [address]; [place]; [place]; [address] Phone [phone].

## 2019-02-07 ENCOUNTER — OUTPATIENT (OUTPATIENT)
Dept: OUTPATIENT SERVICES | Facility: HOSPITAL | Age: 59
LOS: 1 days | End: 2019-02-07
Payer: MEDICARE

## 2019-02-07 DIAGNOSIS — Z41.9 ENCOUNTER FOR PROCEDURE FOR PURPOSES OTHER THAN REMEDYING HEALTH STATE, UNSPECIFIED: Chronic | ICD-10-CM

## 2019-02-07 DIAGNOSIS — Z98.84 BARIATRIC SURGERY STATUS: Chronic | ICD-10-CM

## 2019-02-07 LAB
ALBUMIN SERPL ELPH-MCNC: 3.8 G/DL — SIGNIFICANT CHANGE UP (ref 3.3–5)
ALBUMIN SERPL ELPH-MCNC: 3.8 G/DL — SIGNIFICANT CHANGE UP (ref 3.3–5)
ALP SERPL-CCNC: 91 U/L — SIGNIFICANT CHANGE UP (ref 40–120)
ALP SERPL-CCNC: 91 U/L — SIGNIFICANT CHANGE UP (ref 40–120)
ALT FLD-CCNC: 22 U/L — SIGNIFICANT CHANGE UP (ref 4–33)
ALT FLD-CCNC: 22 U/L — SIGNIFICANT CHANGE UP (ref 4–33)
ANION GAP SERPL CALC-SCNC: 14 MMO/L — SIGNIFICANT CHANGE UP (ref 7–14)
AST SERPL-CCNC: 15 U/L — SIGNIFICANT CHANGE UP (ref 4–32)
AST SERPL-CCNC: 15 U/L — SIGNIFICANT CHANGE UP (ref 4–32)
BASOPHILS # BLD AUTO: 0.03 K/UL — SIGNIFICANT CHANGE UP (ref 0–0.2)
BASOPHILS NFR BLD AUTO: 0.5 % — SIGNIFICANT CHANGE UP (ref 0–2)
BILIRUB DIRECT SERPL-MCNC: < 0.2 MG/DL — SIGNIFICANT CHANGE UP (ref 0.1–0.2)
BILIRUB SERPL-MCNC: 0.5 MG/DL — SIGNIFICANT CHANGE UP (ref 0.2–1.2)
BILIRUB SERPL-MCNC: 0.5 MG/DL — SIGNIFICANT CHANGE UP (ref 0.2–1.2)
BUN SERPL-MCNC: 30 MG/DL — HIGH (ref 7–23)
CALCIUM SERPL-MCNC: 8.9 MG/DL — SIGNIFICANT CHANGE UP (ref 8.4–10.5)
CHLORIDE SERPL-SCNC: 108 MMOL/L — HIGH (ref 98–107)
CO2 SERPL-SCNC: 22 MMOL/L — SIGNIFICANT CHANGE UP (ref 22–31)
CREAT SERPL-MCNC: 0.75 MG/DL — SIGNIFICANT CHANGE UP (ref 0.5–1.3)
EOSINOPHIL # BLD AUTO: 0.13 K/UL — SIGNIFICANT CHANGE UP (ref 0–0.5)
EOSINOPHIL NFR BLD AUTO: 2 % — SIGNIFICANT CHANGE UP (ref 0–6)
GLUCOSE SERPL-MCNC: 78 MG/DL — SIGNIFICANT CHANGE UP (ref 70–99)
HCT VFR BLD CALC: 40.6 % — SIGNIFICANT CHANGE UP (ref 34.5–45)
HGB BLD-MCNC: 12.6 G/DL — SIGNIFICANT CHANGE UP (ref 11.5–15.5)
IMM GRANULOCYTES NFR BLD AUTO: 0.6 % — SIGNIFICANT CHANGE UP (ref 0–1.5)
LYMPHOCYTES # BLD AUTO: 1.75 K/UL — SIGNIFICANT CHANGE UP (ref 1–3.3)
LYMPHOCYTES # BLD AUTO: 26.8 % — SIGNIFICANT CHANGE UP (ref 13–44)
MCHC RBC-ENTMCNC: 29.1 PG — SIGNIFICANT CHANGE UP (ref 27–34)
MCHC RBC-ENTMCNC: 31 % — LOW (ref 32–36)
MCV RBC AUTO: 93.8 FL — SIGNIFICANT CHANGE UP (ref 80–100)
MONOCYTES # BLD AUTO: 0.48 K/UL — SIGNIFICANT CHANGE UP (ref 0–0.9)
MONOCYTES NFR BLD AUTO: 7.3 % — SIGNIFICANT CHANGE UP (ref 2–14)
NEUTROPHILS # BLD AUTO: 4.11 K/UL — SIGNIFICANT CHANGE UP (ref 1.8–7.4)
NEUTROPHILS NFR BLD AUTO: 62.8 % — SIGNIFICANT CHANGE UP (ref 43–77)
NRBC # FLD: 0 K/UL — LOW (ref 25–125)
PLATELET # BLD AUTO: 156 K/UL — SIGNIFICANT CHANGE UP (ref 150–400)
PMV BLD: 11.3 FL — SIGNIFICANT CHANGE UP (ref 7–13)
POTASSIUM SERPL-MCNC: 4.2 MMOL/L — SIGNIFICANT CHANGE UP (ref 3.5–5.3)
POTASSIUM SERPL-SCNC: 4.2 MMOL/L — SIGNIFICANT CHANGE UP (ref 3.5–5.3)
PROT SERPL-MCNC: 6.2 G/DL — SIGNIFICANT CHANGE UP (ref 6–8.3)
PROT SERPL-MCNC: 6.2 G/DL — SIGNIFICANT CHANGE UP (ref 6–8.3)
RBC # BLD: 4.33 M/UL — SIGNIFICANT CHANGE UP (ref 3.8–5.2)
RBC # FLD: 16 % — HIGH (ref 10.3–14.5)
SODIUM SERPL-SCNC: 144 MMOL/L — SIGNIFICANT CHANGE UP (ref 135–145)
TSH SERPL-MCNC: 0.98 UIU/ML — SIGNIFICANT CHANGE UP (ref 0.27–4.2)
VALPROATE SERPL-MCNC: 54.6 UG/ML — SIGNIFICANT CHANGE UP (ref 50–100)
WBC # BLD: 6.54 K/UL — SIGNIFICANT CHANGE UP (ref 3.8–10.5)
WBC # FLD AUTO: 6.54 K/UL — SIGNIFICANT CHANGE UP (ref 3.8–10.5)

## 2019-03-06 DIAGNOSIS — F06.30 MOOD DISORDER DUE TO KNOWN PHYSIOLOGICAL CONDITION, UNSPECIFIED: ICD-10-CM

## 2019-03-13 ENCOUNTER — APPOINTMENT (OUTPATIENT)
Dept: BARIATRICS | Facility: CLINIC | Age: 59
End: 2019-03-13
Payer: COMMERCIAL

## 2019-03-13 VITALS
SYSTOLIC BLOOD PRESSURE: 126 MMHG | HEIGHT: 64 IN | OXYGEN SATURATION: 95 % | HEART RATE: 78 BPM | DIASTOLIC BLOOD PRESSURE: 83 MMHG | BODY MASS INDEX: 23.13 KG/M2 | WEIGHT: 135.5 LBS

## 2019-03-13 DIAGNOSIS — K22.11 ULCER OF ESOPHAGUS WITH BLEEDING: ICD-10-CM

## 2019-03-13 DIAGNOSIS — M62.81 MUSCLE WEAKNESS (GENERALIZED): ICD-10-CM

## 2019-03-13 PROCEDURE — 99213 OFFICE O/P EST LOW 20 MIN: CPT

## 2019-03-13 NOTE — PHYSICAL EXAM
[Normal] : well developed, well nourished, in no acute distress [de-identified] : midline incision well healed [de-identified] : [facility] improved

## 2019-03-13 NOTE — HISTORY OF PRESENT ILLNESS
[de-identified] : Azucena looks great and doing very well\par much better since on depakoate\par has really come long way\par ambulating well\par tolerating diet  and moves bowels daily\par

## 2019-03-14 NOTE — PROGRESS NOTE ADULT - ASSESSMENT
57 F s/p  SBR, fistula resection, esophagojejunostomy revision w/ post-op course complicated by RTOR for distal anastomosis breakdown, ATN, acute respiratory failure, & septic shock, MRSA bacteremia which resolved. Currently for wound care management and surgical planning.    1. Enterocutanous fistula  - wound care- ostomy manager to LIWS  - NPO/ TPN/2 sucking candy/day  - AM labs, weekly albumin, pre-albumin and triglyceride (every friday).      2. Fever- unclear eitology  - F/u ID recs Vanc/ Zosyn (1/3-1/5)  - F/U blood CTX NG x1 d    3. Hx of DVT   - Lovenox    4. Depression/ Anxiety   - escitalopram  - diazepam  - Gabapentin     5. GERD  - Protonix    6. PPX  OOB/ PT/ Is L knee contusion c R forearm strain.  Stable joint, non-concerning exam, will obtain XR knee and given nsaids.  D/C if WNL.  --BMM L knee contusion c R forearm strain.  Stable joint, non-concerning exam, will obtain XR knee and given nsaids.  D/C if WNL.  --BMM    left knee pain s/p trauma, will get xray knee r/o fracture less concern for acute bony or ligamentous injury with normal exam just with mild ecchymosis over medial knee, nsaids, xray, Follow up pcp

## 2019-03-28 ENCOUNTER — RESULT REVIEW (OUTPATIENT)
Age: 59
End: 2019-03-28

## 2019-04-04 ENCOUNTER — OUTPATIENT (OUTPATIENT)
Dept: OUTPATIENT SERVICES | Facility: HOSPITAL | Age: 59
LOS: 1 days | End: 2019-04-04
Payer: COMMERCIAL

## 2019-04-04 ENCOUNTER — APPOINTMENT (OUTPATIENT)
Dept: RADIOLOGY | Facility: CLINIC | Age: 59
End: 2019-04-04
Payer: COMMERCIAL

## 2019-04-04 ENCOUNTER — APPOINTMENT (OUTPATIENT)
Dept: MAMMOGRAPHY | Facility: CLINIC | Age: 59
End: 2019-04-04
Payer: COMMERCIAL

## 2019-04-04 DIAGNOSIS — Z41.9 ENCOUNTER FOR PROCEDURE FOR PURPOSES OTHER THAN REMEDYING HEALTH STATE, UNSPECIFIED: Chronic | ICD-10-CM

## 2019-04-04 DIAGNOSIS — Z98.84 BARIATRIC SURGERY STATUS: Chronic | ICD-10-CM

## 2019-04-04 DIAGNOSIS — Z00.8 ENCOUNTER FOR OTHER GENERAL EXAMINATION: ICD-10-CM

## 2019-04-04 PROCEDURE — 77063 BREAST TOMOSYNTHESIS BI: CPT | Mod: 26

## 2019-04-04 PROCEDURE — 77080 DXA BONE DENSITY AXIAL: CPT | Mod: 26

## 2019-04-04 PROCEDURE — 77067 SCR MAMMO BI INCL CAD: CPT | Mod: 26

## 2019-04-04 PROCEDURE — 77080 DXA BONE DENSITY AXIAL: CPT

## 2019-04-04 PROCEDURE — 77067 SCR MAMMO BI INCL CAD: CPT

## 2019-04-04 PROCEDURE — 77063 BREAST TOMOSYNTHESIS BI: CPT

## 2019-04-11 NOTE — PROGRESS NOTE ADULT - ATTENDING COMMENTS
TRANSFER - IN REPORT: 
 
Verbal report received from Pat Wise RN on [facility]  being received from PACU for routine post - op Report consisted of patients Situation, Background, Assessment and  
Recommendations(SBAR). Information from the following report(s) SBAR was reviewed with the receiving nurse. Opportunity for questions and clarification was provided. Assessment completed upon patients arrival to unit and care assumed. The patient is doing better pulmonary -wise after drainage of the pleural space and she's on pressure support trials and possible trach color.

## 2019-05-30 NOTE — CONSULT NOTE ADULT - SUBJECTIVE AND OBJECTIVE BOX
30-May-2019 Surgery and nephrology have requested a temporary hemodialysis catheter to help manage the renal dysfunction. The history of bariatric surgery is noted along with the cxr and data.  On ultrasound exam the right int jugular vein does not appear normal and suitable for a catheter; the right subclavian vein appears small and would be a difficult choice. The left int  jugular vein is large and normal. Even though the left IJ has a TLC, it can still easily accommodate the HD catheter. The procedure and indications were explained to her  for  consent.

## 2019-06-03 NOTE — CHART NOTE - NSCHARTNOTEFT_GEN_A_CORE
Admitting Diagnosis:   Patient is a 57y old  Female who presents with a chief complaint of enterocutaneous fistula (24 Jul 2017 14:15)      PAST MEDICAL & SURGICAL HISTORY:  Reflux  Obesity  DVT (deep venous thrombosis): 2013 neck  Diverticulitis  Hypertension  Elective surgery: abodominal wall surgery  dec 2016  Elective surgery: NOV 2016 gastric bypass revision  Gastric bypass status for obesity: gastric sleeve, 6/2012  S/P breast biopsy: 2011, left  S/P colon resection: 2011  S/P knee surgery: repair 2009, 2011  right; left  Umbilical hernia: 2000  S/P appendectomy: 1975  S/P tonsillectomy: 1967      Current Nutrition Order:   TPN via PICC line: 254 g D, 104 g AA, no lipids today. Providing pt with 1279 kcal, 104 g protein, 1858 mL fluid. GIR = 1.77.    GI Issues: Abdominal pain, nausea reported. +BM, +flatus    Pain: Being managed     Skin Integrity: DTI, stage II pressure injury, wound vac to midline abdomen, malecot in entertotomy site    Labs:   09-26    138  |  102  |  28<H>  ----------------------------<  149<H>  4.0   |  23  |  0.47<L>    Ca    9.8      26 Sep 2017 07:52  Phos  3.1     09-26  Mg     2.0     09-26      CAPILLARY BLOOD GLUCOSE  127 (27 Sep 2017 12:17)  135 (27 Sep 2017 06:30)  118 (27 Sep 2017 00:03)  111 (26 Sep 2017 20:55)  125 (26 Sep 2017 17:21)      Medications:  MEDICATIONS  (STANDING):  insulin lispro (HumaLOG) corrective regimen sliding scale   SubCutaneous every 6 hours  dextrose 5%. 1000 milliLiter(s) (50 mL/Hr) IV Continuous <Continuous>  dextrose 50% Injectable 25 Gram(s) IV Push once  sodium chloride 0.9% lock flush 20 milliLiter(s) IV Push once  cyanocobalamin Injectable 1000 MICROGram(s) SubCutaneous every 7 days  dextrose 50% Injectable 12.5 Gram(s) IV Push once  pantoprazole  Injectable 40 milliGRAM(s) IV Push daily  calcitriol Injectable 1 MICROGram(s) IV Push <User Schedule>  collagenase Ointment 1 Application(s) Topical daily  escitalopram 20 milliGRAM(s) Oral daily  losartan 50 milliGRAM(s) Oral daily  diphenhydrAMINE   Injectable 50 milliGRAM(s) IV Push at bedtime  Parenteral Nutrition - Adult 1 Each (64 mL/Hr) TPN Continuous <Continuous>  enoxaparin Injectable 30 milliGRAM(s) SubCutaneous two times a day  heparin  flush 100 Units/mL Injectable 100 Unit(s) IV Push every other day  nystatin Powder 1 Application(s) Topical two times a day  diazepam    Tablet 5 milliGRAM(s) Oral at bedtime  fat emulsion 20% IVPB 50 Gram(s) IV Intermittent once  Parenteral Nutrition - Adult 1 Each (64 mL/Hr) TPN Continuous <Continuous>  HYDROmorphone  Injectable 0.25 milliGRAM(s) IV Push daily    MEDICATIONS  (PRN):  sodium chloride 0.9% lock flush 10 milliLiter(s) IV Push every 1 hour PRN After each medication administration  sodium chloride 0.9% lock flush 10 milliLiter(s) IV Push every 12 hours PRN Lumen of catheter NOT used      Weight: 99.5kg     Weight Change: No new weight since Aug 1st. Need to obtain new actual weight.     Estimated energy needs: remain unchanged from previous follow ups  Estimated energy needs using 52 kg IBW:  30-35 kcal/kg (1212-9849 kcal).   1.8-2 g/kg ( g protein).  30-35 mL/kg (0436-5817 mL fluid).     Subjective: Pt remains on TPN via PICC line. NPO. No lipids today;  WNL. , trending down. Malecot in enterotomy site, wound vac to midline, DTI, and stage II pressure injury. TPN providing 100% estimated needs. Pt lethargic at time of consult, denied GI distress. +flatus, +BM per MD note. Pt to continue receiving 100% nutriton via TPN until fistula resolved.     Previous Nutrition Diagnosis: Increased nutrient needs RT fistula & s/p surgery AEB increased kcal/protein needs.     Active [  X]  Resolved [   ]    Goal: PT to meet >75% EER via tolerated route    Recommendations:   1. Please take actual weight for trending purposes  2. Continue with current TPN order  3. PO diet per MD discretion   4. Continue to check labs: BG, Phos, Mg, K, Na, TG    Education: Understands meeting nutrient needs via TPN.    Risk Level: High [ X  ] Moderate [   ] Low [   ]. Other /

## 2019-09-18 NOTE — BEHAVIORAL HEALTH ASSESSMENT NOTE - AFFECT QUALITY
My signature below certifies that the above stated patient is homebound and upon completion of the Face-To-Face encounter, has the need for intermittent skilled nursing, physical therapy and/or speech or occupational therapy services in their home for their current diagnosis as outlined in their initial plan of care. These services will continue to be monitored by myself or another physician.
Other

## 2019-09-24 LAB
ALBUMIN SERPL ELPH-MCNC: 3.8 G/DL — SIGNIFICANT CHANGE UP (ref 3.3–5)
ALP SERPL-CCNC: 77 U/L — SIGNIFICANT CHANGE UP (ref 40–120)
ALT FLD-CCNC: 17 U/L — SIGNIFICANT CHANGE UP (ref 4–33)
ANION GAP SERPL CALC-SCNC: 16 MMO/L — HIGH (ref 7–14)
AST SERPL-CCNC: 17 U/L — SIGNIFICANT CHANGE UP (ref 4–32)
BILIRUB SERPL-MCNC: 0.7 MG/DL — SIGNIFICANT CHANGE UP (ref 0.2–1.2)
BUN SERPL-MCNC: 34 MG/DL — HIGH (ref 7–23)
CALCIUM SERPL-MCNC: 9.1 MG/DL — SIGNIFICANT CHANGE UP (ref 8.4–10.5)
CHLORIDE SERPL-SCNC: 99 MMOL/L — SIGNIFICANT CHANGE UP (ref 98–107)
CHOLEST SERPL-MCNC: 133 MG/DL — SIGNIFICANT CHANGE UP (ref 120–199)
CO2 SERPL-SCNC: 26 MMOL/L — SIGNIFICANT CHANGE UP (ref 22–31)
CREAT SERPL-MCNC: 1.05 MG/DL — SIGNIFICANT CHANGE UP (ref 0.5–1.3)
GLUCOSE SERPL-MCNC: 143 MG/DL — HIGH (ref 70–99)
HBA1C BLD-MCNC: 4.5 % — SIGNIFICANT CHANGE UP (ref 4–5.6)
HCT VFR BLD CALC: 36 % — SIGNIFICANT CHANGE UP (ref 34.5–45)
HDLC SERPL-MCNC: 58 MG/DL — SIGNIFICANT CHANGE UP (ref 45–65)
HGB BLD-MCNC: 10.9 G/DL — LOW (ref 11.5–15.5)
INR BLD: 1.47 — HIGH (ref 0.88–1.17)
LIPID PNL WITH DIRECT LDL SERPL: 55 MG/DL — SIGNIFICANT CHANGE UP
MCHC RBC-ENTMCNC: 30 PG — SIGNIFICANT CHANGE UP (ref 27–34)
MCHC RBC-ENTMCNC: 30.3 % — LOW (ref 32–36)
MCV RBC AUTO: 99.2 FL — SIGNIFICANT CHANGE UP (ref 80–100)
NRBC # FLD: 0 K/UL — SIGNIFICANT CHANGE UP (ref 0–0)
PLATELET # BLD AUTO: 226 K/UL — SIGNIFICANT CHANGE UP (ref 150–400)
PMV BLD: 10.7 FL — SIGNIFICANT CHANGE UP (ref 7–13)
POTASSIUM SERPL-MCNC: 3.5 MMOL/L — SIGNIFICANT CHANGE UP (ref 3.5–5.3)
POTASSIUM SERPL-SCNC: 3.5 MMOL/L — SIGNIFICANT CHANGE UP (ref 3.5–5.3)
PROT SERPL-MCNC: 6.4 G/DL — SIGNIFICANT CHANGE UP (ref 6–8.3)
PROTHROM AB SERPL-ACNC: 17 SEC — HIGH (ref 9.8–13.1)
RBC # BLD: 3.63 M/UL — LOW (ref 3.8–5.2)
RBC # FLD: 15.4 % — HIGH (ref 10.3–14.5)
SODIUM SERPL-SCNC: 141 MMOL/L — SIGNIFICANT CHANGE UP (ref 135–145)
TRIGL SERPL-MCNC: 91 MG/DL — SIGNIFICANT CHANGE UP (ref 10–149)
VALPROATE SERPL-MCNC: 38.9 UG/ML — LOW (ref 50–100)
WBC # BLD: 6.42 K/UL — SIGNIFICANT CHANGE UP (ref 3.8–10.5)
WBC # FLD AUTO: 6.42 K/UL — SIGNIFICANT CHANGE UP (ref 3.8–10.5)

## 2019-10-02 ENCOUNTER — APPOINTMENT (OUTPATIENT)
Dept: BARIATRICS | Facility: CLINIC | Age: 59
End: 2019-10-02
Payer: MEDICARE

## 2019-10-02 VITALS
SYSTOLIC BLOOD PRESSURE: 126 MMHG | DIASTOLIC BLOOD PRESSURE: 84 MMHG | WEIGHT: 156.38 LBS | BODY MASS INDEX: 26.7 KG/M2 | HEART RATE: 65 BPM | OXYGEN SATURATION: 100 % | HEIGHT: 64 IN

## 2019-10-02 DIAGNOSIS — E66.01 MORBID (SEVERE) OBESITY DUE TO EXCESS CALORIES: ICD-10-CM

## 2019-10-02 DIAGNOSIS — K22.2 ESOPHAGEAL OBSTRUCTION: ICD-10-CM

## 2019-10-02 PROCEDURE — 99213 OFFICE O/P EST LOW 20 MIN: CPT

## 2019-10-02 NOTE — PROGRESS NOTE ADULT - SUBJECTIVE AND OBJECTIVE BOX
O/N: wound vac leak reinforced, packing changed.  2/28: wound vac changed, BRAYDEN DAILY PROGRESS NOTE:    INTERVAL HPI / OVERNIGHT EVENTS:  O/N: wound vac leak reinforced, packing changed.  2/28: wound vac changed, BRAYDEN    SUBJECTIVE:  Pt seen and examined at bedside. No overnight events.  Pain controlled. No f/c/n/v.     MEDICATIONS  (STANDING):  losartan 100milliGRAM(s) Oral daily  escitalopram 20milliGRAM(s) Oral daily  ALBUTerol    90 MICROgram(s) HFA Inhaler 1Puff(s) Inhalation every 4 hours  cyanocobalamin 250MICROGram(s) Oral daily  cholecalciferol 400Unit(s) Oral daily  multivitamin 1Tablet(s) Oral daily  ferrous    sulfate 325milliGRAM(s) Oral daily  enoxaparin Injectable 60milliGRAM(s) SubCutaneous daily  pantoprazole    Tablet 40milliGRAM(s) Oral daily  collagenase Ointment 1Application(s) Topical daily  bisacodyl 5milliGRAM(s) Oral at bedtime    MEDICATIONS  (PRN):  acetaminophen   Tablet. 325milliGRAM(s) Oral every 4 hours PRN Moderate Pain (4 - 6)  ondansetron Injectable 4milliGRAM(s) IV Push every 6 hours PRN Nausea  ALBUTerol    0.083% 2.5milliGRAM(s) Nebulizer every 6 hours PRN Shortness of Breath and/or Wheezing  oxybutynin 5milliGRAM(s) Oral two times a day PRN Bladder spasms  diazepam    Tablet 10milliGRAM(s) Oral at bedtime PRN insomnia  diphenhydrAMINE   Capsule 25milliGRAM(s) Oral every 6 hours PRN Rash and/or Itching  oxyCODONE  5 mG/acetaminophen 325 mG 1Tablet(s) Oral every 4 hours PRN Severe Pain (7 - 10)    Vital Signs Last 24 Hrs  T(C): 36.6, Max: 37.6 (02-28 @ 20:10)  T(F): 97.9, Max: 99.6 (02-28 @ 20:10)  HR: 84 (78 - 96)  BP: 116/77 (101/70 - 123/79)  BP(mean): --  RR: 17 (15 - 18)  SpO2: 95% (95% - 98%)    PHYSICAL EXAM:  Gen: NAD  Pulm: no respiratory distress  Abd: soft,  midline wound & 5 I&D sites noted. Wound vac to be re-applied to midline wound today and I&D sites top be re-packed.    I&O's Detail  I & Os for 24h ending 01 Mar 2017 07:00  =============================================  IN:    Oral Fluid: 1410 ml    Total IN: 1410 ml  ---------------------------------------------  OUT:    Voided: 1450 ml    Total OUT: 1450 ml  ---------------------------------------------  Total NET: -40 ml    I & Os for current day (as of 01 Mar 2017 12:48)  =============================================  IN:    Oral Fluid: 360 ml    Total IN: 360 ml  ---------------------------------------------  OUT:    Voided: 250 ml    Total OUT: 250 ml  ---------------------------------------------  Total NET: 110 ml      LABS:                        10.3   7.2   )-----------( 290      ( 01 Mar 2017 06:56 )             33.3     01 Mar 2017 06:56    140    |  105    |  21     ----------------------------<  81     4.4     |  27     |  0.52     Ca    9.9        01 Mar 2017 06:56  Phos  3.4       01 Mar 2017 06:56  Mg     2.2       01 Mar 2017 06:56 Spiral Flap Text: The defect edges were debeveled with a #15 scalpel blade.  Given the location of the defect, shape of the defect and the proximity to free margins a spiral flap was deemed most appropriate.  Using a sterile surgical marker, an appropriate rotation flap was drawn incorporating the defect and placing the expected incisions within the relaxed skin tension lines where possible. The area thus outlined was incised deep to adipose tissue with a #15 scalpel blade.  The skin margins were undermined to an appropriate distance in all directions utilizing iris scissors.

## 2019-10-02 NOTE — HISTORY OF PRESENT ILLNESS
[de-identified] : Azucena looks great and doing very well\par weight up \par \par has resumed normal life \par really doing great\par maintain current routine and avoid excess treatments\par very happy

## 2019-10-29 NOTE — PROGRESS NOTE ADULT - VASCULAR
No
detailed exam
Equal and normal pulses (carotid, femoral, dorsalis pedis)

## 2020-01-16 LAB
ALBUMIN SERPL ELPH-MCNC: 4.2 G/DL — SIGNIFICANT CHANGE UP (ref 3.3–5)
ALP SERPL-CCNC: 87 U/L — SIGNIFICANT CHANGE UP (ref 40–120)
ALT FLD-CCNC: 22 U/L — SIGNIFICANT CHANGE UP (ref 4–33)
ANION GAP SERPL CALC-SCNC: 15 MMO/L — HIGH (ref 7–14)
AST SERPL-CCNC: 21 U/L — SIGNIFICANT CHANGE UP (ref 4–32)
BILIRUB SERPL-MCNC: 0.9 MG/DL — SIGNIFICANT CHANGE UP (ref 0.2–1.2)
BUN SERPL-MCNC: 32 MG/DL — HIGH (ref 7–23)
CALCIUM SERPL-MCNC: 8.5 MG/DL — SIGNIFICANT CHANGE UP (ref 8.4–10.5)
CHLORIDE SERPL-SCNC: 102 MMOL/L — SIGNIFICANT CHANGE UP (ref 98–107)
CO2 SERPL-SCNC: 25 MMOL/L — SIGNIFICANT CHANGE UP (ref 22–31)
CREAT SERPL-MCNC: 0.82 MG/DL — SIGNIFICANT CHANGE UP (ref 0.5–1.3)
GLUCOSE SERPL-MCNC: 85 MG/DL — SIGNIFICANT CHANGE UP (ref 70–99)
HCT VFR BLD CALC: 39.1 % — SIGNIFICANT CHANGE UP (ref 34.5–45)
HGB BLD-MCNC: 11.5 G/DL — SIGNIFICANT CHANGE UP (ref 11.5–15.5)
MCHC RBC-ENTMCNC: 27.9 PG — SIGNIFICANT CHANGE UP (ref 27–34)
MCHC RBC-ENTMCNC: 29.4 % — LOW (ref 32–36)
MCV RBC AUTO: 94.9 FL — SIGNIFICANT CHANGE UP (ref 80–100)
NRBC # FLD: 0 K/UL — SIGNIFICANT CHANGE UP (ref 0–0)
PLATELET # BLD AUTO: 250 K/UL — SIGNIFICANT CHANGE UP (ref 150–400)
PMV BLD: 11.1 FL — SIGNIFICANT CHANGE UP (ref 7–13)
POTASSIUM SERPL-MCNC: 3.2 MMOL/L — LOW (ref 3.5–5.3)
POTASSIUM SERPL-SCNC: 3.2 MMOL/L — LOW (ref 3.5–5.3)
PROT SERPL-MCNC: 6.7 G/DL — SIGNIFICANT CHANGE UP (ref 6–8.3)
RBC # BLD: 4.12 M/UL — SIGNIFICANT CHANGE UP (ref 3.8–5.2)
RBC # FLD: 17 % — HIGH (ref 10.3–14.5)
SODIUM SERPL-SCNC: 142 MMOL/L — SIGNIFICANT CHANGE UP (ref 135–145)
TSH SERPL-MCNC: 1.2 UIU/ML — SIGNIFICANT CHANGE UP (ref 0.27–4.2)
VALPROATE SERPL-MCNC: 56 UG/ML — SIGNIFICANT CHANGE UP (ref 50–100)
WBC # BLD: 6.95 K/UL — SIGNIFICANT CHANGE UP (ref 3.8–10.5)
WBC # FLD AUTO: 6.95 K/UL — SIGNIFICANT CHANGE UP (ref 3.8–10.5)

## 2020-01-27 NOTE — PATIENT PROFILE ADULT - FLU SEASON?
[address] [facility]      Pt Name: Jeanine Yoo  MRN: [id number]  [place] 1982  Date of evaluation: 1/26/2020  Provider: EDDA Hercules    This patient was not seen and evaluated by the attending physician No att. providers found. CHIEF COMPLAINT       Chief Complaint   Patient presents with    Abdominal Pain     patient c/o generalized abdominal pain since yesterday. denies nausea/vomiting, diarrhea, constipation, or fever. to ED for eval       CRITICAL CARE TIME   I performed a total Critical Care time of  15 minutes, excluding separately reportable procedures. There was a high probability of clinically significant/life threatening deterioration in the patient's condition which required my urgent intervention. Not limited to multiple reexaminations, discussions with attending physician and consultants. HISTORY OF PRESENT ILLNESS  (Location/Symptom, Timing/Onset, Context/Setting, Quality, Duration, Modifying Factors, Severity.)   Jeanine Yoo is a 40 y.o. female who presents to the emergency department complaining of abdominal pain. I spoke through the Take the Interview  #[id number]. Abdominal pain for a year. It started a yesterday and comes and goes feels exactly like the prior pain she has had. She denies chest pain or shortness of breath. No prior abdominal surgeries or known chronic medical problems. No nausea vomiting diarrhea or constipation. No vaginal bleeding or concern for STDs. Has not noted any urinary symptoms. Rates her pain at 4 out of 10. Is not better or worse with food. Nursing Notes were reviewed and I agree. REVIEW OF SYSTEMS    (2-9 systems for level 4, 10 or more for level 5)     Review of Systems   Constitutional: Negative for fever. Respiratory: Negative for shortness of breath. Cardiovascular: Negative for chest pain. Gastrointestinal: Positive for abdominal pain.  Negative for constipation, diarrhea, nausea and vomiting. Genitourinary: Negative for dysuria. Musculoskeletal: Negative for back pain. Skin: Negative for color change, rash and wound. Neurological: Negative for weakness and numbness. Psychiatric/Behavioral: Negative for agitation, behavioral problems and confusion. Except as noted above the remainder of the review of systems was reviewed and negative. PAST MEDICAL HISTORY   History reviewed. No pertinent past medical history. SURGICAL HISTORY     History reviewed. No pertinent surgical history. CURRENT MEDICATIONS       Discharge Medication List as of 1/26/2020 10:06 PM      CONTINUE these medications which have NOT CHANGED    Details   acetaminophen (AMINOFEN) 325 MG tablet Take 2 tablets by mouth every 6 hours as needed for Pain, Disp-60 tablet, R-0Print             ALLERGIES     Patient has no known allergies. FAMILY HISTORY     History reviewed. No pertinent family history. No family status information on file. SOCIAL HISTORY      reports that she has never smoked. She has never used smokeless tobacco. She reports that she does not drink alcohol or use drugs. PHYSICAL EXAM    (up to 7 for level 4, 8 or more for level 5)     ED Triage Vitals [01/26/20 1943]   BP Temp Temp Source Pulse Resp SpO2 Height Weight   (!) 157/92 98.1 °F (36.7 °C) Oral 79 16 98 % 5' 7\" (1.702 m) 192 lb 0.3 oz (87.1 kg)     Physical Exam  Vitals signs and nursing note reviewed. Constitutional:       Appearance: Normal appearance. HENT:      Head: Normocephalic and atraumatic. Eyes:      Pupils: Pupils are equal, round, and reactive to light. Neck:      Musculoskeletal: Normal range of motion. Cardiovascular:      Rate and Rhythm: Normal rate. Pulmonary:      Effort: Pulmonary effort is normal. No respiratory distress. Abdominal:      Tenderness: There is abdominal tenderness in the epigastric area. There is no guarding or rebound. Yes...

## 2020-02-19 ENCOUNTER — OUTPATIENT (OUTPATIENT)
Dept: OUTPATIENT SERVICES | Facility: HOSPITAL | Age: 60
LOS: 1 days | End: 2020-02-19
Payer: MEDICARE

## 2020-02-19 VITALS
OXYGEN SATURATION: 97 % | HEIGHT: 63 IN | DIASTOLIC BLOOD PRESSURE: 90 MMHG | HEART RATE: 76 BPM | TEMPERATURE: 98 F | RESPIRATION RATE: 15 BRPM | WEIGHT: 149.91 LBS | SYSTOLIC BLOOD PRESSURE: 145 MMHG

## 2020-02-19 DIAGNOSIS — Z41.9 ENCOUNTER FOR PROCEDURE FOR PURPOSES OTHER THAN REMEDYING HEALTH STATE, UNSPECIFIED: Chronic | ICD-10-CM

## 2020-02-19 DIAGNOSIS — M20.41 OTHER HAMMER TOE(S) (ACQUIRED), RIGHT FOOT: ICD-10-CM

## 2020-02-19 DIAGNOSIS — Z01.818 ENCOUNTER FOR OTHER PREPROCEDURAL EXAMINATION: ICD-10-CM

## 2020-02-19 DIAGNOSIS — Z90.3 ACQUIRED ABSENCE OF STOMACH [PART OF]: Chronic | ICD-10-CM

## 2020-02-19 DIAGNOSIS — Z86.718 PERSONAL HISTORY OF OTHER VENOUS THROMBOSIS AND EMBOLISM: ICD-10-CM

## 2020-02-19 DIAGNOSIS — M21.611 BUNION OF RIGHT FOOT: ICD-10-CM

## 2020-02-19 DIAGNOSIS — Z98.84 BARIATRIC SURGERY STATUS: Chronic | ICD-10-CM

## 2020-02-19 LAB
ALBUMIN SERPL ELPH-MCNC: 3.2 G/DL — LOW (ref 3.3–5)
ALP SERPL-CCNC: 97 U/L — SIGNIFICANT CHANGE UP (ref 30–120)
ALT FLD-CCNC: 41 U/L DA — SIGNIFICANT CHANGE UP (ref 10–60)
ANION GAP SERPL CALC-SCNC: 9 MMOL/L — SIGNIFICANT CHANGE UP (ref 5–17)
APTT BLD: 39.3 SEC — HIGH (ref 28.5–37)
AST SERPL-CCNC: 22 U/L — SIGNIFICANT CHANGE UP (ref 10–40)
BILIRUB SERPL-MCNC: 0.8 MG/DL — SIGNIFICANT CHANGE UP (ref 0.2–1.2)
BUN SERPL-MCNC: 32 MG/DL — HIGH (ref 7–23)
CALCIUM SERPL-MCNC: 8.3 MG/DL — LOW (ref 8.4–10.5)
CHLORIDE SERPL-SCNC: 110 MMOL/L — HIGH (ref 96–108)
CO2 SERPL-SCNC: 26 MMOL/L — SIGNIFICANT CHANGE UP (ref 22–31)
CREAT SERPL-MCNC: 1.01 MG/DL — SIGNIFICANT CHANGE UP (ref 0.5–1.3)
GLUCOSE SERPL-MCNC: 80 MG/DL — SIGNIFICANT CHANGE UP (ref 70–99)
HCT VFR BLD CALC: 38.7 % — SIGNIFICANT CHANGE UP (ref 34.5–45)
HGB BLD-MCNC: 11.4 G/DL — LOW (ref 11.5–15.5)
INR BLD: 1.72 RATIO — HIGH (ref 0.88–1.16)
MCHC RBC-ENTMCNC: 27 PG — SIGNIFICANT CHANGE UP (ref 27–34)
MCHC RBC-ENTMCNC: 29.5 GM/DL — LOW (ref 32–36)
MCV RBC AUTO: 91.7 FL — SIGNIFICANT CHANGE UP (ref 80–100)
NRBC # BLD: 0 /100 WBCS — SIGNIFICANT CHANGE UP (ref 0–0)
PLATELET # BLD AUTO: 227 K/UL — SIGNIFICANT CHANGE UP (ref 150–400)
POTASSIUM SERPL-MCNC: 3.9 MMOL/L — SIGNIFICANT CHANGE UP (ref 3.5–5.3)
POTASSIUM SERPL-SCNC: 3.9 MMOL/L — SIGNIFICANT CHANGE UP (ref 3.5–5.3)
PROT SERPL-MCNC: 6.6 G/DL — SIGNIFICANT CHANGE UP (ref 6–8.3)
PROTHROM AB SERPL-ACNC: 19.4 SEC — HIGH (ref 10–12.9)
RBC # BLD: 4.22 M/UL — SIGNIFICANT CHANGE UP (ref 3.8–5.2)
RBC # FLD: 16.4 % — HIGH (ref 10.3–14.5)
SODIUM SERPL-SCNC: 145 MMOL/L — SIGNIFICANT CHANGE UP (ref 135–145)
WBC # BLD: 7.08 K/UL — SIGNIFICANT CHANGE UP (ref 3.8–10.5)
WBC # FLD AUTO: 7.08 K/UL — SIGNIFICANT CHANGE UP (ref 3.8–10.5)

## 2020-02-19 PROCEDURE — 85610 PROTHROMBIN TIME: CPT

## 2020-02-19 PROCEDURE — 93005 ELECTROCARDIOGRAM TRACING: CPT

## 2020-02-19 PROCEDURE — 36415 COLL VENOUS BLD VENIPUNCTURE: CPT

## 2020-02-19 PROCEDURE — 85027 COMPLETE CBC AUTOMATED: CPT

## 2020-02-19 PROCEDURE — 93010 ELECTROCARDIOGRAM REPORT: CPT

## 2020-02-19 PROCEDURE — G0463: CPT

## 2020-02-19 PROCEDURE — 85730 THROMBOPLASTIN TIME PARTIAL: CPT

## 2020-02-19 PROCEDURE — 80053 COMPREHEN METABOLIC PANEL: CPT

## 2020-02-19 NOTE — H&P PST ADULT - NSICDXPASTSURGICALHX_GEN_ALL_CORE_FT
PAST SURGICAL HISTORY:  History of sleeve gastrectomy 2012    S/P appendectomy 1975    S/P colon resection 2011    S/P knee surgery repair 2009, 2011  right; left    S/P tonsillectomy 1967    Umbilical hernia 2000

## 2020-02-19 NOTE — H&P PST ADULT - NSICDXPROBLEM_GEN_ALL_CORE_FT
PROBLEM DIAGNOSES  Problem: Bunion, right  Assessment and Plan: bunionectomy and hammertoe repair second right toe on 3/6/2020  Pending cardiac clearance  Instructions reviewed  Best wishes offered    Problem: Hammer toe of right foot  Assessment and Plan: see above    Problem: Personal history of DVT (deep vein thrombosis)  Assessment and Plan: Vascular ckearance requested; phone message left for Dr Felix and Dr Thao for personal history of subclavian DVT X@ and maternal DVT with collin filter.  The patient denied any hematology workup in the past.  Dr Thao's office was notified and message left to confirm that there is no hematology consult on record.  Dr Thurman was left a message to advise patient about Xarelto and aspirin instructions.  The patient is instructed to call this office to inform of further instructions/testing.

## 2020-02-19 NOTE — H&P PST ADULT - HISTORY OF PRESENT ILLNESS
60 yo female presents to PST for scheduled right bunion repair with second right hammertoe repair on 3/6/2020 with Dr Fisher at [facility].  patient reports right foot pain for many years; especially painful when she wears shoes and bears weight.

## 2020-02-19 NOTE — H&P PST ADULT - NSICDXFAMILYHX_GEN_ALL_CORE_FT
FAMILY HISTORY:  Father  Still living? Unknown  Family history of diabetes mellitus, Age at diagnosis: Age Unknown    Mother  Still living? No  Family history of DVT, Age at diagnosis: Age Unknown    Sibling  Still living? Yes, Estimated age: Age Unknown  Family history of diabetes mellitus, Age at diagnosis: Age Unknown

## 2020-02-19 NOTE — H&P PST ADULT - NSICDXPASTMEDICALHX_GEN_ALL_CORE_FT
PAST MEDICAL HISTORY:  Bipolar disorder     Bunion, right     Family history of DVT     Hammer toe of right foot 2nd    History of diverticulitis     Personal history of DVT (deep vein thrombosis) subclavian X2, 2014    Urinary incontinence

## 2020-02-19 NOTE — H&P PST ADULT - REASON FOR ADMISSION
""I am having surgery on my right bunion and second right hammertoe" "I am having surgery on my right bunion and second right hammertoe"

## 2020-03-05 ENCOUNTER — TRANSCRIPTION ENCOUNTER (OUTPATIENT)
Age: 60
End: 2020-03-05

## 2020-03-05 NOTE — ASU PATIENT PROFILE, ADULT - SITE
Called left message to call back to reschedule appointment for 12/20/2018 with Jero Jara.  Provider unavailable please offer next available appointment
meghan jenkins sx

## 2020-03-05 NOTE — ASU PATIENT PROFILE, ADULT - PMH
Bipolar disorder    Bipolar disorder    Bunion, right    Bunion, right    Family history of DVT    Hammer toe of right foot  2nd  Hammer toe of right foot  2nd  History of diverticulitis    Personal history of DVT (deep vein thrombosis)  subclavian X2, 2014  Urinary incontinence    Urinary incontinence

## 2020-03-05 NOTE — ASU PATIENT PROFILE, ADULT - PSH
History of appendectomy  age 15  History of arthroscopy of both knees  2008, 2010  History of colon resection  2011  History of sleeve gastrectomy  2012  History of sleeve gastrectomy  2012; revision 2015  History of tonsillectomy  age 9  History of umbilical hernia repair  1995  S/P appendectomy  1975  S/P colon resection  2011  S/P knee surgery  repair 2009, 2011  right; left  S/P tonsillectomy  1967  Umbilical hernia  2000

## 2020-03-06 ENCOUNTER — OUTPATIENT (OUTPATIENT)
Dept: OUTPATIENT SERVICES | Facility: HOSPITAL | Age: 60
LOS: 1 days | End: 2020-03-06
Payer: MEDICARE

## 2020-03-06 ENCOUNTER — RESULT REVIEW (OUTPATIENT)
Age: 60
End: 2020-03-06

## 2020-03-06 VITALS
HEART RATE: 63 BPM | DIASTOLIC BLOOD PRESSURE: 93 MMHG | RESPIRATION RATE: 15 BRPM | OXYGEN SATURATION: 97 % | SYSTOLIC BLOOD PRESSURE: 179 MMHG

## 2020-03-06 VITALS
TEMPERATURE: 98 F | SYSTOLIC BLOOD PRESSURE: 157 MMHG | OXYGEN SATURATION: 98 % | HEART RATE: 66 BPM | RESPIRATION RATE: 9 BRPM | DIASTOLIC BLOOD PRESSURE: 85 MMHG | WEIGHT: 160.94 LBS | HEIGHT: 63 IN

## 2020-03-06 DIAGNOSIS — Z01.818 ENCOUNTER FOR OTHER PREPROCEDURAL EXAMINATION: ICD-10-CM

## 2020-03-06 DIAGNOSIS — Z90.3 ACQUIRED ABSENCE OF STOMACH [PART OF]: Chronic | ICD-10-CM

## 2020-03-06 DIAGNOSIS — M20.41 OTHER HAMMER TOE(S) (ACQUIRED), RIGHT FOOT: ICD-10-CM

## 2020-03-06 LAB
APTT BLD: 26.1 SEC — LOW (ref 28.5–37)
INR BLD: 1.06 RATIO — SIGNIFICANT CHANGE UP (ref 0.88–1.16)
PROTHROM AB SERPL-ACNC: 11.8 SEC — SIGNIFICANT CHANGE UP (ref 10–12.9)

## 2020-03-06 PROCEDURE — 28285 REPAIR OF HAMMERTOE: CPT | Mod: T6

## 2020-03-06 PROCEDURE — 76000 FLUOROSCOPY <1 HR PHYS/QHP: CPT

## 2020-03-06 PROCEDURE — 88311 DECALCIFY TISSUE: CPT | Mod: 26

## 2020-03-06 PROCEDURE — 85730 THROMBOPLASTIN TIME PARTIAL: CPT

## 2020-03-06 PROCEDURE — 88305 TISSUE EXAM BY PATHOLOGIST: CPT | Mod: 26

## 2020-03-06 PROCEDURE — 88305 TISSUE EXAM BY PATHOLOGIST: CPT

## 2020-03-06 PROCEDURE — C1713: CPT

## 2020-03-06 PROCEDURE — 97161 PT EVAL LOW COMPLEX 20 MIN: CPT

## 2020-03-06 PROCEDURE — 85610 PROTHROMBIN TIME: CPT

## 2020-03-06 PROCEDURE — 28297 COR HLX VLGS JT ARTHRD: CPT | Mod: RT

## 2020-03-06 PROCEDURE — 36415 COLL VENOUS BLD VENIPUNCTURE: CPT

## 2020-03-06 PROCEDURE — 88311 DECALCIFY TISSUE: CPT

## 2020-03-06 RX ORDER — CEFAZOLIN SODIUM 1 G
2000 VIAL (EA) INJECTION ONCE
Refills: 0 | Status: COMPLETED | OUTPATIENT
Start: 2020-03-06 | End: 2020-03-06

## 2020-03-06 RX ORDER — DIVALPROEX SODIUM 500 MG/1
0 TABLET, DELAYED RELEASE ORAL
Qty: 0 | Refills: 0 | DISCHARGE

## 2020-03-06 RX ORDER — CHLORHEXIDINE GLUCONATE 213 G/1000ML
1 SOLUTION TOPICAL ONCE
Refills: 0 | Status: COMPLETED | OUTPATIENT
Start: 2020-03-06 | End: 2020-03-06

## 2020-03-06 RX ORDER — OXYCODONE HYDROCHLORIDE 5 MG/1
5 TABLET ORAL ONCE
Refills: 0 | Status: DISCONTINUED | OUTPATIENT
Start: 2020-03-06 | End: 2020-03-09

## 2020-03-06 RX ORDER — LANOLIN ALCOHOL/MO/W.PET/CERES
1 CREAM (GRAM) TOPICAL
Qty: 0 | Refills: 0 | DISCHARGE

## 2020-03-06 RX ORDER — DIVALPROEX SODIUM 500 MG/1
750 TABLET, DELAYED RELEASE ORAL
Qty: 0 | Refills: 0 | DISCHARGE

## 2020-03-06 RX ORDER — CEPHALEXIN 500 MG
1 CAPSULE ORAL
Qty: 12 | Refills: 0
Start: 2020-03-06 | End: 2020-03-08

## 2020-03-06 RX ORDER — SOLIFENACIN SUCCINATE 10 MG/1
20 TABLET ORAL
Qty: 0 | Refills: 0 | DISCHARGE

## 2020-03-06 RX ORDER — HYDROMORPHONE HYDROCHLORIDE 2 MG/ML
0.5 INJECTION INTRAMUSCULAR; INTRAVENOUS; SUBCUTANEOUS
Refills: 0 | Status: DISCONTINUED | OUTPATIENT
Start: 2020-03-06 | End: 2020-03-09

## 2020-03-06 RX ORDER — ONDANSETRON 8 MG/1
4 TABLET, FILM COATED ORAL ONCE
Refills: 0 | Status: DISCONTINUED | OUTPATIENT
Start: 2020-03-06 | End: 2020-03-09

## 2020-03-06 RX ORDER — SODIUM CHLORIDE 9 MG/ML
1000 INJECTION, SOLUTION INTRAVENOUS
Refills: 0 | Status: DISCONTINUED | OUTPATIENT
Start: 2020-03-06 | End: 2020-03-09

## 2020-03-06 RX ORDER — RIVAROXABAN 15 MG-20MG
1 KIT ORAL
Qty: 0 | Refills: 0 | DISCHARGE

## 2020-03-06 RX ADMIN — CHLORHEXIDINE GLUCONATE 1 APPLICATION(S): 213 SOLUTION TOPICAL at 09:19

## 2020-03-06 RX ADMIN — SODIUM CHLORIDE 100 MILLILITER(S): 9 INJECTION, SOLUTION INTRAVENOUS at 15:06

## 2020-03-06 NOTE — BRIEF OPERATIVE NOTE - NSICDXBRIEFPOSTOP_GEN_ALL_CORE_FT
POST-OP DIAGNOSIS:  Hallux valgus, right 06-Mar-2020 10:20:01  Nathaniel Roman POST-OP DIAGNOSIS:  Hammer toe 06-Mar-2020 13:56:40  Nathaniel Roman  Hallux valgus, right 06-Mar-2020 10:20:01  Nathaniel Roman

## 2020-03-06 NOTE — ASU DISCHARGE PLAN (ADULT/PEDIATRIC) - CARE PROVIDER_API CALL
Reed Fisher)  Orthopaedic Surgery  [address] [place] [address]  Phone: [phone]  Fax: [phone]  Established Patient  Follow Up Time: 2 weeks

## 2020-03-06 NOTE — BRIEF OPERATIVE NOTE - NSICDXBRIEFPROCEDURE_GEN_ALL_CORE_FT
PROCEDURES:  Lapidus bunionectomy 06-Mar-2020 10:19:33 right Nathaniel Roman PROCEDURES:  Repair, hammer toe 06-Mar-2020 13:56:13 right 2nd toe Nathaniel Roman  Lapidus bunionectomy 06-Mar-2020 10:19:33 right Nathaniel Roman

## 2020-03-06 NOTE — BRIEF OPERATIVE NOTE - NSICDXBRIEFPREOP_GEN_ALL_CORE_FT
PRE-OP DIAGNOSIS:  Hallux valgus, right 06-Mar-2020 10:19:48  Nathaniel Roman PRE-OP DIAGNOSIS:  Hammer toe 06-Mar-2020 13:56:28  Nathaniel Roman  Hallux valgus, right 06-Mar-2020 10:19:48  Nathaniel Roman

## 2020-03-06 NOTE — ASU DISCHARGE PLAN (ADULT/PEDIATRIC) - ASU DC SPECIAL INSTRUCTIONSFT
rest  ice  elevate toe above nose  Narcotic pain medications as  needed  caution for sedation  NON- Weight bearing on Operated extremity  Follow up Primary Care MD following discharge from facility  Follow Up Dr. Fisher office  call office for appt  Follow up Hematologist following discharge from facility rest  ice  elevate toe above nose  Narcotic pain medications as  needed  caution for sedation  heel weight bearing in post op shoe    *****  Follow up Primary Care MD following discharge from facility  Follow Up Dr. Fisher office  call office for appt  Follow up Hematologist following discharge from facility rest  ice  elevate toe above nose  Narcotic pain medications as  needed  caution for sedation  non weight bearing  on operated extremity   *****  Follow up Primary Care MD following discharge from facility  Follow Up Dr. Fisher office  call office for appt  Follow up Hematologist following discharge from facility

## 2020-03-06 NOTE — ASU DISCHARGE PLAN (ADULT/PEDIATRIC) - CALL YOUR DOCTOR IF YOU HAVE ANY OF THE FOLLOWING:
Nausea and vomiting that does not stop/Fever greater than (need to indicate Fahrenheit or Celsius)/Wound/Surgical Site with redness, or foul smelling discharge or pus/Bleeding that does not stop/Swelling that gets worse/Pain not relieved by Medications/Numbness, tingling, color or temperature change to extremity

## 2020-03-16 NOTE — ED PROVIDER NOTE - ATTENDING CONTRIBUTION TO CARE
Please see if she can come earlier in the day.  Perhaps 1340?  Thanks!   I have personally performed a face to face diagnostic evaluation on this patient.  I have reviewed the PA note and agree with the history, exam, and plan of care, except as noted.  History and Exam by me shows  see progress note

## 2020-03-23 NOTE — ED ADULT TRIAGE NOTE - BP NONINVASIVE DIASTOLIC (MM HG)
Care Management Interventions PCP Verified by CM: Yes Last Visit to PCP: 11/11/19 Mode of Transport at Discharge: BLS Transition of Care Consult (CM Consult): Discharge Planning, SNF Discharge Durable Medical Equipment: No 
Physical Therapy Consult: Yes Occupational Therapy Consult: Yes Speech Therapy Consult: No 
Current Support Network: Lives with Spouse, Own Home Confirm Follow Up Transport: Family The Plan for Transition of Care is Related to the Following Treatment Goals : SNF The Patient and/or Patient Representative was Provided with a Choice of Provider and Agrees with the Discharge Plan?: Yes Name of the Patient Representative Who was Provided with a Choice of Provider and Agrees with the Discharge Plan: Pt and wife, Jurgen Sink Freedom of Choice List was Provided with Basic Dialogue that Supports the Patient's Individualized Plan of Care/Goals, Treatment Preferences and Shares the Quality Data Associated with the Providers?: Yes Discharge Location Discharge Placement: Rehab Unit Subacute CM received acceptance of pt to [facility] by [place]. [facility] ambulance for transport to facility. Wife called. No answer, voicemail left. No further CM needs. 87

## 2020-04-10 NOTE — PROGRESS NOTE ADULT - SUBJECTIVE AND OBJECTIVE BOX
O/N: VSS, BRAYDEN  9/12: Fistula drain placed and wound vac changed. vss  VSS O/N: VSS, NAE9/12: Fistula drain placed and wound vac changed. vss  VSS  OVERNIGHT EVENTS:    STATUS POST:      POST OPERATIVE DAY #:     SUBJECTIVE: Patient examined bedside. Patient reports pain controlled   Flatus: [X] YES [] NO             Bowel Movement: [X ] YES [ ] NO  Pain (0-10):            Pain Control Adequate: [X ] YES [ ] NO  Nausea: [ ] YES [ X] NO            Vomiting: [ ] YES [ X] NO  Diarrhea: [ ] YES [X ] NO         Constipation: [ ] YES [X ] NO     Chest Pain: [ ] YES [ X] NO    SOB:  [ ] YES [X ] NO    piperacillin/tazobactam IVPB. 4.5 Gram(s) IV Intermittent every 6 hours  losartan 50 milliGRAM(s) Oral daily  heparin  flush 100 Units/mL Injectable 100 Unit(s) IV Push every other day  enoxaparin Injectable 30 milliGRAM(s) SubCutaneous two times a day      Vital Signs Last 24 Hrs  T(C): 36.1 (13 Sep 2017 05:55), Max: 37.2 (12 Sep 2017 17:04)  T(F): 97 (13 Sep 2017 05:55), Max: 99 (12 Sep 2017 17:04)  HR: 61 (13 Sep 2017 05:55) (61 - 76)  BP: 124/85 (13 Sep 2017 05:55) (108/76 - 161/80)  BP(mean): --  RR: 16 (13 Sep 2017 05:55) (16 - 18)  SpO2: 96% (13 Sep 2017 05:55) (94% - 98%)  I&O's Detail    12 Sep 2017 07:01  -  13 Sep 2017 07:00  --------------------------------------------------------  IN:    Solution: 200 mL    TPN (Total Parenteral Nutrition): 768 mL  Total IN: 968 mL    OUT:    VAC (Vacuum Assisted Closure) System: 75 mL    Voided: 1350 mL  Total OUT: 1425 mL    Total NET: -457 mL      13 Sep 2017 07:01  -  13 Sep 2017 09:52  --------------------------------------------------------  IN:    TPN (Total Parenteral Nutrition): 64 mL  Total IN: 64 mL    OUT:  Total OUT: 0 mL    Total NET: 64 mL          General: NAD, resting comfortably in bed  C/V: NSR  Pulm: Nonlabored breathing, no respiratory distress  Abd: soft, NT/ND.  Extrem: WWP, no edema, SCDs in place  Wound vac functioning   Drain to intermittent sunction      LABS:                        9.6    5.6   )-----------( 266      ( 13 Sep 2017 06:51 )             31.6     09-13    141  |  105  |  32<H>  ----------------------------<  127<H>  4.1   |  25  |  0.80    Ca    10.0      13 Sep 2017 06:51  Phos  3.1     09-13  Mg     2.0     09-13 Pt remains in critical condition Pressors maxed out and bp remains soft with map beween 50 and 65.

## 2020-04-30 NOTE — ASU PATIENT PROFILE, ADULT - AS SC BRADEN FRICTION
Sent a My Chart message response.  Amanda Branham MA  [facility]  2nd Floor  Primary Care    
(3) no apparent problem

## 2020-05-20 PROBLEM — Z86.718 PERSONAL HISTORY OF OTHER VENOUS THROMBOSIS AND EMBOLISM: Chronic | Status: ACTIVE | Noted: 2020-02-19

## 2020-05-20 PROBLEM — Z87.19 PERSONAL HISTORY OF OTHER DISEASES OF THE DIGESTIVE SYSTEM: Chronic | Status: ACTIVE | Noted: 2020-02-19

## 2020-05-20 PROBLEM — M20.41 OTHER HAMMER TOE(S) (ACQUIRED), RIGHT FOOT: Chronic | Status: ACTIVE | Noted: 2020-02-19

## 2020-05-20 PROBLEM — Z82.49 FAMILY HISTORY OF ISCHEMIC HEART DISEASE AND OTHER DISEASES OF THE CIRCULATORY SYSTEM: Chronic | Status: ACTIVE | Noted: 2020-02-19

## 2020-05-20 PROBLEM — F31.9 BIPOLAR DISORDER, UNSPECIFIED: Chronic | Status: ACTIVE | Noted: 2020-02-19

## 2020-05-20 PROBLEM — M21.611 BUNION OF RIGHT FOOT: Chronic | Status: ACTIVE | Noted: 2020-02-19

## 2020-05-20 PROBLEM — R32 UNSPECIFIED URINARY INCONTINENCE: Chronic | Status: ACTIVE | Noted: 2020-02-19

## 2020-05-26 ENCOUNTER — APPOINTMENT (OUTPATIENT)
Dept: BARIATRICS | Facility: CLINIC | Age: 60
End: 2020-05-26
Payer: MEDICARE

## 2020-05-26 VITALS — WEIGHT: 150 LBS | BODY MASS INDEX: 25.75 KG/M2

## 2020-05-26 DIAGNOSIS — K31.6 FISTULA OF STOMACH AND DUODENUM: ICD-10-CM

## 2020-05-26 DIAGNOSIS — Z87.19 PERSONAL HISTORY OF OTHER DISEASES OF THE DIGESTIVE SYSTEM: ICD-10-CM

## 2020-05-26 PROCEDURE — 99213 OFFICE O/P EST LOW 20 MIN: CPT | Mod: 95

## 2020-05-26 NOTE — REASON FOR VISIT
[FreeTextEntry2] : s/p repair of chronic sleeve leak followed by other complication and fistulas requiring revision [Follow-Up Visit] : a follow-up visit for

## 2020-05-26 NOTE — HISTORY OF PRESENT ILLNESS
[Other Location: e.g. Home (Enter Location, City,State)___] : at [unfilled] [Home] : at home, [unfilled] , at the time of the visit. [Verbal consent obtained from patient] : the patient, [unfilled] [Spouse] : spouse [de-identified] : Azucena looks and sounds great\par really has turned corner and life seems fine\par no medical issues\par eats small portions \par blood work has been fine \par happy with her progress \par no current issues

## 2020-06-03 ENCOUNTER — APPOINTMENT (OUTPATIENT)
Dept: BARIATRICS | Facility: CLINIC | Age: 60
End: 2020-06-03

## 2020-07-04 NOTE — PROGRESS NOTE ADULT - GASTROINTESTINAL
- Resolved  - Stop IVF. Encourage PO intake  - Monitor   details… Soft, non-tender, no hepatosplenomegaly, normal bowel sounds

## 2020-07-21 ENCOUNTER — APPOINTMENT (OUTPATIENT)
Dept: MAMMOGRAPHY | Facility: CLINIC | Age: 60
End: 2020-07-21

## 2020-07-22 NOTE — ED PROVIDER NOTE - CROS ED ROS STATEMENT
Patient brought in the room and educated on procedure  Lidocaine 2% topical solution inserted via nostrils  Manometry catheter inserted via  Right nostril and secured  10 liquid swallows, 10 viscous swallow and 1 rapid swallow performed  Patient Tolerated procedure  Catheter removed intact   PH probe inserted via right nostril and secured  Zephr recorder teachback performed and patient verbalized understanding  Patient instructed to return next day to have probe remove  Discharge instructions given and patient ambulated out of room in stable condition 
all other ROS negative except as per HPI

## 2020-07-23 ENCOUNTER — EMERGENCY (EMERGENCY)
Facility: HOSPITAL | Age: 60
LOS: 1 days | Discharge: HOME | End: 2020-07-23
Attending: EMERGENCY MEDICINE | Admitting: EMERGENCY MEDICINE
Payer: MEDICARE

## 2020-07-23 VITALS
HEIGHT: 63 IN | RESPIRATION RATE: 18 BRPM | SYSTOLIC BLOOD PRESSURE: 144 MMHG | HEART RATE: 95 BPM | WEIGHT: 149.91 LBS | DIASTOLIC BLOOD PRESSURE: 95 MMHG | OXYGEN SATURATION: 96 % | TEMPERATURE: 99 F

## 2020-07-23 DIAGNOSIS — Z90.3 ACQUIRED ABSENCE OF STOMACH [PART OF]: Chronic | ICD-10-CM

## 2020-07-23 PROCEDURE — 99282 EMERGENCY DEPT VISIT SF MDM: CPT | Mod: 25

## 2020-07-23 PROCEDURE — 30901 CONTROL OF NOSEBLEED: CPT | Mod: LT

## 2020-07-23 NOTE — ED ADULT TRIAGE NOTE - CHIEF COMPLAINT QUOTE
Pt. complaining of nose bleed from left nare. Pt. stated she was seen by ENT 2 weeks ago and was cauterized, pt. stated she was also cauterized yesterday by ENT. Denied dizziness of lightheadedness. Pt. stated she is on Eliquis for DVT.

## 2020-07-23 NOTE — ED PROVIDER NOTE - PHYSICAL EXAMINATION
Constitutional: Awake, Alert, non-toxic. NAD. Well appearing, well nourished.   HEAD: Normocephalic, atraumatic.   EYES: EOM intact, conjunctiva and sclera are clear bilaterally.   ENT: No rhinorrhea, patent, mucous membranes pink/moist, no drooling or stridor. (+) left epistaxis, minimal blood loss, mild dripping. No clots, no hematoma.   NECK: Supple, non-tender  RESPIRATORY: Normal respiratory effort  EXTREMITIES: Full passive and active ROM in all extremities; non-tender to palpation; distal pulses palpable and symmetric  SKIN: Warm, dry; good skin turgor, no apparent lesions or rashes, no ecchymosis, brisk capillary refill.  NEURO: A&O x3. Sensory and motor functions are grossly intact. Speech is normal. Appearance and judgement seem appropriate for gender and age.

## 2020-07-23 NOTE — ED PROVIDER NOTE - ATTENDING CONTRIBUTION TO CARE
61 yo F with recent epistaxis, seen by ENT yest and cauterized. Now with bleeding again in L nare. no acute trauma. no fever/chills. no weakness / dizziness. Pt on eliquis. No HA./n/v. no agg/allev factors. No other inj or co.  exam: mm moist. neck supple. non-toxic, well appearing. gentle oozing L nare. No edema to face. no signs of trauma. no mm pallor. no c/c/e. no other acute findings.  Packing to L nare, outpt ENT fu as scheduled tomorrow

## 2020-07-23 NOTE — ED PROVIDER NOTE - NSFOLLOWUPINSTRUCTIONS_ED_ALL_ED_FT
Follow up with your ENT doctor tomorrow. an ENT doctor was provided in case you need one. Return to ED for any persistent bleeding, dizziness, shortness of breath, palpitations, fever, etc. Your packing should be removed in 2 days.

## 2020-07-23 NOTE — ED PROVIDER NOTE - PATIENT PORTAL LINK FT
You can access the FollowMyHealth Patient Portal offered by [facility] by registering at the following website: [email or web address]. By joining [employer]’s FollowMyHealth portal, you will also be able to view your health information using other applications (apps) compatible with our system.

## 2020-07-23 NOTE — ED PROVIDER NOTE - CLINICAL SUMMARY MEDICAL DECISION MAKING FREE TEXT BOX
c/o left sided nose bleed. has ENT follow up tomorrow. ON eliquis. plan includes nasal packing and ENT follow up.

## 2020-07-23 NOTE — ED PROVIDER NOTE - NSFOLLOWUPCLINICS_GEN_ALL_ED_FT
[facility] - ENT  Otolaryngology (ENT)  [address] [place] [address]  Phone: [phone]  Fax:   Follow Up Time: 1-3 Days

## 2020-07-23 NOTE — ED PROVIDER NOTE - OBJECTIVE STATEMENT
59 y/o female with PMHx Bipolar Disorder and DVT presents today c/o left sided nose bleed. pt reports she has this occur two weeks ago in which had it cauterized with ENT but it returned two days ago. pt notes she saw her ENT yesterday in which had it cauterized again but has been persistently bleeding. pt notes she is on Eliquis for DVT. Pt reports having ENT appointment tomorrow. Pt denies trauma, fever, headache, dizziness, cp, sob, palpitations, or any other complaints.

## 2020-08-19 ENCOUNTER — APPOINTMENT (OUTPATIENT)
Dept: MAMMOGRAPHY | Facility: CLINIC | Age: 60
End: 2020-08-19
Payer: MEDICARE

## 2020-08-19 ENCOUNTER — OUTPATIENT (OUTPATIENT)
Dept: OUTPATIENT SERVICES | Facility: HOSPITAL | Age: 60
LOS: 1 days | End: 2020-08-19
Payer: MEDICARE

## 2020-08-19 ENCOUNTER — APPOINTMENT (OUTPATIENT)
Dept: ULTRASOUND IMAGING | Facility: CLINIC | Age: 60
End: 2020-08-19
Payer: MEDICARE

## 2020-08-19 DIAGNOSIS — Z90.3 ACQUIRED ABSENCE OF STOMACH [PART OF]: Chronic | ICD-10-CM

## 2020-08-19 DIAGNOSIS — Z00.8 ENCOUNTER FOR OTHER GENERAL EXAMINATION: ICD-10-CM

## 2020-08-19 PROCEDURE — 77067 SCR MAMMO BI INCL CAD: CPT

## 2020-08-19 PROCEDURE — 77067 SCR MAMMO BI INCL CAD: CPT | Mod: 26

## 2020-08-19 PROCEDURE — 77063 BREAST TOMOSYNTHESIS BI: CPT | Mod: 26

## 2020-08-19 PROCEDURE — 76641 ULTRASOUND BREAST COMPLETE: CPT | Mod: 26,50

## 2020-08-19 PROCEDURE — 77063 BREAST TOMOSYNTHESIS BI: CPT

## 2020-08-19 PROCEDURE — 76641 ULTRASOUND BREAST COMPLETE: CPT

## 2020-08-19 NOTE — PROGRESS NOTE ADULT - SUBJECTIVE AND OBJECTIVE BOX
On interval followup, the left subclavian venous catheter site is clean, dry and non-inflamed. Afebrile. Notes and recent cultures are followed. 168

## 2020-08-24 ENCOUNTER — RECORD ABSTRACTING (OUTPATIENT)
Age: 60
End: 2020-08-24

## 2020-08-24 DIAGNOSIS — J30.9 ALLERGIC RHINITIS, UNSPECIFIED: ICD-10-CM

## 2020-08-24 DIAGNOSIS — Z87.09 PERSONAL HISTORY OF OTHER DISEASES OF THE RESPIRATORY SYSTEM: ICD-10-CM

## 2020-08-24 DIAGNOSIS — R04.0 EPISTAXIS: ICD-10-CM

## 2020-08-24 DIAGNOSIS — J98.8 OTHER SPECIFIED RESPIRATORY DISORDERS: ICD-10-CM

## 2020-08-24 DIAGNOSIS — Z86.2 PERSONAL HISTORY OF DISEASES OF THE BLOOD AND BLOOD-FORMING ORGANS AND CERTAIN DISORDERS INVOLVING THE IMMUNE MECHANISM: ICD-10-CM

## 2020-08-24 DIAGNOSIS — J34.2 DEVIATED NASAL SEPTUM: ICD-10-CM

## 2020-08-24 RX ORDER — DIVALPROEX SODIUM 125 MG/1
125 CAPSULE, COATED PELLETS ORAL
Refills: 0 | Status: ACTIVE | COMMUNITY

## 2020-08-24 RX ORDER — AMOXICILLIN AND CLAVULANATE POTASSIUM 875; 125 MG/1; MG/1
875-125 TABLET, COATED ORAL
Refills: 0 | Status: ACTIVE | COMMUNITY

## 2020-08-24 RX ORDER — QUETIAPINE FUMARATE 25 MG/1
25 TABLET ORAL
Refills: 0 | Status: ACTIVE | COMMUNITY

## 2020-09-03 ENCOUNTER — RESULT REVIEW (OUTPATIENT)
Age: 60
End: 2020-09-03

## 2020-09-15 NOTE — PROGRESS NOTE ADULT - PROBLEM SELECTOR PLAN 7
Detail Level: Zone Albumin 2.1.  -receiving tPA  -Encourage po intake when clinically appropriate.    Recommendations are preliminary pending Attending review. Albumin 2.1.  -receiving tPA  -Encourage po intake when clinically appropriate.    Case discussed with Dr Church and Primary Team.

## 2020-09-22 ENCOUNTER — APPOINTMENT (OUTPATIENT)
Dept: OTOLARYNGOLOGY | Facility: CLINIC | Age: 60
End: 2020-09-22

## 2020-10-01 ENCOUNTER — APPOINTMENT (OUTPATIENT)
Dept: OTOLARYNGOLOGY | Facility: CLINIC | Age: 60
End: 2020-10-01
Payer: MEDICARE

## 2020-10-01 VITALS — BODY MASS INDEX: 25.61 KG/M2 | TEMPERATURE: 97.2 F | WEIGHT: 150 LBS | HEIGHT: 64 IN

## 2020-10-01 DIAGNOSIS — H60.90 UNSPECIFIED OTITIS EXTERNA, UNSPECIFIED EAR: ICD-10-CM

## 2020-10-01 DIAGNOSIS — H92.20 OTORRHAGIA, UNSPECIFIED EAR: ICD-10-CM

## 2020-10-01 PROCEDURE — 99204 OFFICE O/P NEW MOD 45 MIN: CPT

## 2020-10-01 RX ORDER — NEOMYCIN AND POLYMYXIN B SULFATES AND HYDROCORTISONE OTIC 10; 3.5; 1 MG/ML; MG/ML; [USP'U]/ML
3.5-10000-1 SUSPENSION AURICULAR (OTIC) 4 TIMES DAILY
Qty: 1 | Refills: 0 | Status: ACTIVE | COMMUNITY
Start: 2020-10-01 | End: 1900-01-01

## 2020-10-01 NOTE — PHYSICAL EXAM
[Normal] : mucosa is normal [Midline] : trachea located in midline position [de-identified] : right external ear mild skin scratch/ clots removed/ no active bleed/ otowick applied

## 2020-10-01 NOTE — ASSESSMENT
[FreeTextEntry1] : right external canal bleed due to trauma/ TM intact\par otowick 24 hours\par cortisporin\par water percaution\par f/u 1 week

## 2020-10-01 NOTE — HISTORY OF PRESENT ILLNESS
[de-identified] : right ear bleed following aggresice use of q tip 2 days\par taking xeralta for a fib

## 2020-10-28 NOTE — PROGRESS NOTE ADULT - SUBJECTIVE AND OBJECTIVE BOX
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 more laert and stable. general condition improved. 3+-4/5, except hip flexion 3-/5.    Bed Mobility:    Transfers:maximal assistance of 2 to stand and poor balance.    Ambulation:maximal assistance of 2 for short distance. poor endurance.      Impression:1. Deconditioned. 2. Obesity. 3. s/p gastrectomy 4. s/p respiratory distress/tracheostomy.      Recommendations:1. Continue PT as tolerated. 2. Subacute rehab. placement. ---

## 2020-10-29 NOTE — DISCHARGE NOTE ADULT - NS MD DC PLAN IMMU FLU REF OTH
CAPRINI SCORE [CLOT] Score on Admission for     AGE RELATED RISK FACTORS                                                       MOBILITY RELATED FACTORS  [x ] Age 41-60 years                                            (1 Point)                  [ ] Bed rest                                                        (1 Point)  [ ] Age: 61-74 years                                           (2 Points)                 [ ] Plaster cast                                                   (2 Points)  [ ] Age= 75 years                                              (3 Points)                 [ ] Bed bound for more than 72 hours                 (2 Points)    DISEASE RELATED RISK FACTORS                                               GENDER SPECIFIC FACTORS  [ ] Edema in the lower extremities                       (1 Point)                  [ ] Pregnancy                                                     (1 Point)  [ ] Varicose veins                                               (1 Point)                  [ ] Post-partum < 6 weeks                                   (1 Point)             [x ] BMI > 25 Kg/m2                                            (1 Point)                  [ ] Hormonal therapy  or oral contraception          (1 Point)                 [ ] Sepsis (in the previous month)                        (1 Point)                  [ ] History of pregnancy complications                 (1 point)  [ ] Pneumonia or serious lung disease                                               [ ] Unexplained or recurrent                     (1 Point)           (in the previous month)                               (1 Point)  [ ] Abnormal pulmonary function test                     (1 Point)                 SURGERY RELATED RISK FACTORS (include planned surgeries)  [ ] Acute myocardial infarction                              (1 Point)                 [ ]  Section                                             (1 Point)  [ ] Congestive heart failure (in the previous month)  (1 Point)               [ ] Minor surgery                                                  (1 Point)   [ ] Inflammatory bowel disease                             (1 Point)                 [ ] Arthroscopic surgery                                        (2 Points)  [ ] Central venous access                                      (2 Points)                [x ] General surgery lasting more than 45 minutes   (2 Points)       [ ] Stroke (in the previous month)                          (5 Points)               [ ] Elective arthroplasty                                         (5 Points)            [ ] Current or past malignancy                                (2 Points)                                                                                                     HEMATOLOGY RELATED FACTORS                                                 TRAUMA RELATED RISK FACTORS  [ ] Prior episodes of VTE                                     (3 Points)                [ ] Fracture of the hip, pelvis, or leg                       (5 Points)  [ ] Positive family history for VTE                         (3 Points)                 [ ] Acute spinal cord injury (in the previous month)  (5 Points)  [ ] Prothrombin 39541 A                                     (3 Points)                 [ ] Paralysis  (less than 1 month)                             (5 Points)  [ ] Factor V Leiden                                             (3 Points)                  [ ] Multiple Trauma within 1 month                        (5 Points)  [ ] Lupus anticoagulants                                     (3 Points)                                                           [ ] Anticardiolipin antibodies                               (3 Points)                                                       [ ] High homocysteine in the blood                      (3 Points)                                             [ ] Other congenital or acquired thrombophilia      (3 Points)                                                [ ] Heparin induced thrombocytopenia                  (3 Points)                                          Total Score [   4       ]    Risk:  Very low 0   Low 1 to 2   Moderate 3 to 4   High =5       VTE Prophylasix Recommednations:  [ ] mechanical pneumatic compression devices                                      [ ] contraindicated: _____________________  [ ] chemo prophylasix                                                                                   [x ] contraindicated _____________________    **** HIGH LIKELIHOOD DVT PRESENT ON ADMISSION  [ x] (please order LE dopplers within 24 hours of admission) Refused

## 2020-12-08 ENCOUNTER — EMERGENCY (EMERGENCY)
Facility: HOSPITAL | Age: 60
LOS: 1 days | Discharge: HOME | End: 2020-12-08
Attending: EMERGENCY MEDICINE | Admitting: EMERGENCY MEDICINE
Payer: MEDICARE

## 2020-12-08 VITALS
DIASTOLIC BLOOD PRESSURE: 85 MMHG | HEART RATE: 80 BPM | SYSTOLIC BLOOD PRESSURE: 140 MMHG | OXYGEN SATURATION: 100 % | TEMPERATURE: 98 F | RESPIRATION RATE: 17 BRPM

## 2020-12-08 VITALS
WEIGHT: 147.93 LBS | OXYGEN SATURATION: 100 % | SYSTOLIC BLOOD PRESSURE: 165 MMHG | HEART RATE: 79 BPM | HEIGHT: 63 IN | TEMPERATURE: 98 F | DIASTOLIC BLOOD PRESSURE: 96 MMHG | RESPIRATION RATE: 17 BRPM

## 2020-12-08 DIAGNOSIS — Z90.3 ACQUIRED ABSENCE OF STOMACH [PART OF]: Chronic | ICD-10-CM

## 2020-12-08 PROCEDURE — 99283 EMERGENCY DEPT VISIT LOW MDM: CPT | Mod: 25

## 2020-12-08 PROCEDURE — 90471 IMMUNIZATION ADMIN: CPT

## 2020-12-08 PROCEDURE — 90715 TDAP VACCINE 7 YRS/> IM: CPT

## 2020-12-08 PROCEDURE — 99283 EMERGENCY DEPT VISIT LOW MDM: CPT

## 2020-12-08 RX ORDER — APIXABAN 2.5 MG/1
1 TABLET, FILM COATED ORAL
Qty: 0 | Refills: 0 | DISCHARGE

## 2020-12-08 RX ORDER — MULTIVIT-MIN/FERROUS GLUCONATE 9 MG/15 ML
1 LIQUID (ML) ORAL
Qty: 0 | Refills: 0 | DISCHARGE

## 2020-12-08 RX ORDER — MIRABEGRON 50 MG/1
1 TABLET, EXTENDED RELEASE ORAL
Qty: 0 | Refills: 0 | DISCHARGE

## 2020-12-08 RX ORDER — FOLIC ACID 0.8 MG
1 TABLET ORAL
Qty: 0 | Refills: 0 | DISCHARGE

## 2020-12-08 RX ORDER — TETANUS TOXOID, REDUCED DIPHTHERIA TOXOID AND ACELLULAR PERTUSSIS VACCINE, ADSORBED 5; 2.5; 8; 8; 2.5 [IU]/.5ML; [IU]/.5ML; UG/.5ML; UG/.5ML; UG/.5ML
0.5 SUSPENSION INTRAMUSCULAR ONCE
Refills: 0 | Status: COMPLETED | OUTPATIENT
Start: 2020-12-08 | End: 2020-12-08

## 2020-12-08 RX ORDER — LANOLIN ALCOHOL/MO/W.PET/CERES
1 CREAM (GRAM) TOPICAL
Qty: 0 | Refills: 0 | DISCHARGE

## 2020-12-08 RX ORDER — CEPHALEXIN 500 MG
1 CAPSULE ORAL
Qty: 20 | Refills: 0
Start: 2020-12-08 | End: 2020-12-17

## 2020-12-08 RX ORDER — ASPIRIN/CALCIUM CARB/MAGNESIUM 324 MG
1 TABLET ORAL
Qty: 0 | Refills: 0 | DISCHARGE

## 2020-12-08 RX ORDER — RIVAROXABAN 15 MG-20MG
1 KIT ORAL
Qty: 0 | Refills: 0 | DISCHARGE

## 2020-12-08 RX ADMIN — TETANUS TOXOID, REDUCED DIPHTHERIA TOXOID AND ACELLULAR PERTUSSIS VACCINE, ADSORBED 0.5 MILLILITER(S): 5; 2.5; 8; 8; 2.5 SUSPENSION INTRAMUSCULAR at 12:34

## 2020-12-08 NOTE — ED ADULT NURSE NOTE - OBJECTIVE STATEMENT
pt comes to ed c/o laceration to L ankle last night. Pt states she was in her house and while doing the dishes she cut her foot on her . pt called her family member who is a doctor and told her to apply pressure which she did but continued to bleed. pt is on Eliquis for hx of blood clots. + distal pulses. bleeding controlled, unknown last tetanus. wound cleaned and dressed by ER MD Rodriguez. pt educated on wound care

## 2020-12-08 NOTE — ED PROVIDER NOTE - NSFOLLOWUPINSTRUCTIONS_ED_ALL_ED_FT
Skin Tear    WHAT YOU NEED TO KNOW:    A skin tear occurs when the layers of weakened skin split open from an injury. It is important to treat and prevent skin tears to prevent infection.    DISCHARGE INSTRUCTIONS:    Call your doctor if:   •You have a fever or chills.      •Blood soaks through your bandage.      •You have redness, swelling, pus, or a bad odor coming from your wound.      •You have severe pain.      •Your wound tears open again.      •You have questions or concerns about your condition or care.      Medicines:   •Medicines may be given to decrease pain or treat a bacterial infection.      •Take your medicine as directed. Contact your healthcare provider if you think your medicine is not helping or if you have side effects. Tell him of her if you are allergic to any medicine. Keep a list of the medicines, vitamins, and herbs you take. Include the amounts, and when and why you take them. Bring the list or the pill bottles to follow-up visits. Carry your medicine list with you in case of an emergency.      Prevent a skin tear:   •Clean, moisturize, and protect your skin. Baths, hot showers, and soap can dry your skin and increase your risk for skin tears. Take lukewarm showers, use mild soap as directed, and gently pat your skin dry. Use lotion to keep your skin moist after you shower. Wear long sleeves, pants, and protective footwear.      •Move carefully. Ask for help if you cannot lift yourself. Do not drag your skin when you move.      •Keep your home safe. Cover sharp corners, keep your pathways clear, and turn on lights so you can see clearly. Ask for more information if you have questions about home safety.      •Drink liquids as directed. Ask your provider how much liquid to drink each day and which liquids are best for you. Liquids will help keep your skin moist and protected from another skin tear.      •Eat high-protein foods to help with wound healing. Examples are lean meats, fish, low-fat dairy products, and beans.       Follow up with your healthcare provider as directed: Write down your questions so you remember to ask them during your visits.

## 2020-12-08 NOTE — ED PROVIDER NOTE - PATIENT PORTAL LINK FT
You can access the FollowMyHealth Patient Portal offered by [facility] by registering at the following website: [email or web address]. By joining [facility]’s FollowMyHealth portal, you will also be able to view your health information using other applications (apps) compatible with our system.

## 2020-12-08 NOTE — ED PROVIDER NOTE - CLINICAL SUMMARY MEDICAL DECISION MAKING FREE TEXT BOX
Skin tear laceration last night. No infection or bleeding. Plan - wound care, Rx antibiotic, Tdap, wound check in 3 days.

## 2020-12-08 NOTE — ED PROVIDER NOTE - OBJECTIVE STATEMENT
59 yo F with co lac to left lower leg after she walked into  door last night.  applied pressure dressing. Did not come to ER until now. Denies fever, pain, drainage or other symptom. No recent tetanus shot.   PCP Master.

## 2020-12-14 ENCOUNTER — APPOINTMENT (OUTPATIENT)
Dept: RHEUMATOLOGY | Facility: CLINIC | Age: 60
End: 2020-12-14
Payer: MEDICARE

## 2020-12-14 VITALS — BODY MASS INDEX: 25.61 KG/M2 | TEMPERATURE: 95.5 F | WEIGHT: 150 LBS | HEIGHT: 64 IN

## 2020-12-14 PROCEDURE — 99204 OFFICE O/P NEW MOD 45 MIN: CPT

## 2020-12-14 NOTE — PHYSICAL EXAM
[General Appearance - Alert] : alert [General Appearance - In No Acute Distress] : in no acute distress [General Appearance - Well Nourished] : well nourished [Sclera] : the sclera and conjunctiva were normal [Extraocular Movements] : extraocular movements were intact [Oropharynx] : the oropharynx was normal [Neck Appearance] : the appearance of the neck was normal [Auscultation Breath Sounds / Voice Sounds] : lungs were clear to auscultation bilaterally [Heart Rate And Rhythm] : heart rate was normal and rhythm regular [Heart Sounds] : normal S1 and S2 [Edema] : there was no peripheral edema [Bowel Sounds] : normal bowel sounds [Abdomen Soft] : soft [Abdomen Tenderness] : non-tender [No CVA Tenderness] : no ~M costovertebral angle tenderness [No Spinal Tenderness] : no spinal tenderness [Abnormal Walk] : normal gait [Nail Clubbing] : no clubbing  or cyanosis of the fingernails [Musculoskeletal - Swelling] : no joint swelling seen [Motor Tone] : muscle strength and tone were normal [] : no rash [Motor Exam] : the motor exam was normal [No Focal Deficits] : no focal deficits [Oriented To Time, Place, And Person] : oriented to person, place, and time [Impaired Insight] : insight and judgment were intact [Affect] : the affect was normal

## 2020-12-24 NOTE — ASSESSMENT
[FreeTextEntry1] : AISSATOU DAVIS is a 60 year old woman who presents with DEXA in OP range with unsteady gait and frequent falls, also with some height loss but no spinal tenderness concerning for active spinal fx. Pt would warrant OP treatment currently, cannot have PO bisphosphonates 2/2 prior GI surgeries. \par \par - repeat labs as below -- if Ca/Vit D and renal fxn stable will set her up for Reclast infusion\par - PT referral to improve gait\par - Ca 600mg BID with food, Vit D 1000 IU daily to optimize Ca/Vit D to maintain bone health. \par - Increase weight bearing exercise for 30min 3-4x/week to maintain BMD, reviewed exercises, encouraged to work with PT prior to attempting on her own. \par - TEB in 1 month

## 2020-12-24 NOTE — HISTORY OF PRESENT ILLNESS
[FreeTextEntry1] : AISSATOU DAVIS is a 60 year old woman who presents with DEXA 4/2019 with lowest T score -3 in femoral neck and marked loss of BMD since 2016, and pt notes she lost some height prompting evaluation. \par \par S/p gastric sleeve, lost 140 lbs -- now has no stomach, prior colon rxn for uncertain reason per patient. Vitamin levels stable. \par \par - Unsteady on feet, occasional falls, knees give out, PT in the past helped\par - no known fractures but height loss as above \par - Menses 11- 53 yo \par - Minimal walking and no weight bearing exercise \par - Dietary Ca, Vit D?\par - mother with OP traumatic fx \par - no tobacco, no ETOH, no steroids\par - dental UTD

## 2021-01-01 ENCOUNTER — APPOINTMENT (OUTPATIENT)
Dept: DISASTER EMERGENCY | Facility: CLINIC | Age: 61
End: 2021-01-01

## 2021-01-01 DIAGNOSIS — Z01.818 ENCOUNTER FOR OTHER PREPROCEDURAL EXAMINATION: ICD-10-CM

## 2021-01-01 LAB
SARS-COV-2 N GENE NPH QL NAA+PROBE: NOT DETECTED
SARS-COV-2 N GENE NPH QL NAA+PROBE: NOT DETECTED

## 2021-01-19 ENCOUNTER — APPOINTMENT (OUTPATIENT)
Dept: RHEUMATOLOGY | Facility: CLINIC | Age: 61
End: 2021-01-19

## 2021-01-20 ENCOUNTER — APPOINTMENT (OUTPATIENT)
Dept: RHEUMATOLOGY | Facility: CLINIC | Age: 61
End: 2021-01-20
Payer: MEDICARE

## 2021-01-20 DIAGNOSIS — M81.0 AGE-RELATED OSTEOPOROSIS W/OUT CURRENT PATHOLOGICAL FRACTURE: ICD-10-CM

## 2021-01-20 DIAGNOSIS — R26.81 UNSTEADINESS ON FEET: ICD-10-CM

## 2021-01-20 PROCEDURE — 99213 OFFICE O/P EST LOW 20 MIN: CPT | Mod: 95

## 2021-01-20 NOTE — ASSESSMENT
[FreeTextEntry1] : AISSATOU DAVIS is a 60 year old woman with DEXA in OP range with unsteady gait and frequent falls, also with some height loss but no spinal tenderness concerning for active spinal fx. Pt would warrant OP treatment currently, cannot have PO bisphosphonates 2/2 prior GI surgeries. \par \par - repeat labs as below, will mail to her again and she will have done with anemia b/w upcoming-- if Ca/Vit D and renal fxn stable will set her up for Reclast infusion once anemia issues are resolved \par - PT referral to improve gait -- will mail to her, advised to start once anemia issues are resolved \par - c/w Ca 600mg BID with food, Vit D 1000 IU daily to optimize Ca/Vit D to maintain bone health. \par - attempt to self increase weight bearing exercise for 30min 3-4x/week to maintain BMD -- only as tolerated \par - RPA/TEB after she has been able to go to PT for at least 1 month and has had the pre-infusion labs done.

## 2021-01-20 NOTE — PHYSICAL EXAM
[General Appearance - Alert] : alert [General Appearance - In No Acute Distress] : in no acute distress [General Appearance - Well Nourished] : well nourished [Sclera] : the sclera and conjunctiva were normal [Extraocular Movements] : extraocular movements were intact [Oropharynx] : the oropharynx was normal [Neck Appearance] : the appearance of the neck was normal [Heart Rate And Rhythm] : heart rate was normal and rhythm regular [Heart Sounds] : normal S1 and S2 [Edema] : there was no peripheral edema [Bowel Sounds] : normal bowel sounds [Abdomen Soft] : soft [Abdomen Tenderness] : non-tender [No CVA Tenderness] : no ~M costovertebral angle tenderness [No Spinal Tenderness] : no spinal tenderness [Abnormal Walk] : normal gait [Musculoskeletal - Swelling] : no joint swelling seen [] : no rash [Motor Exam] : the motor exam was normal [No Focal Deficits] : no focal deficits [Oriented To Time, Place, And Person] : oriented to person, place, and time [Impaired Insight] : insight and judgment were intact [Affect] : the affect was normal

## 2021-01-20 NOTE — REASON FOR VISIT
[Home] : at home, [unfilled] , at the time of the visit. [Medical Office: ([facility])___] : at the medical office located in  [Verbal consent obtained from patient] : the patient, [unfilled] [FreeTextEntry1] : osteoporosis, unsteady gait

## 2021-01-20 NOTE — HISTORY OF PRESENT ILLNESS
[FreeTextEntry1] : AISSATOU DAVIS is a 60 year old woman who presents with DEXA 4/2019 with lowest T score -3 in femoral neck and marked loss of BMD since 2016, and pt notes she lost some height prompting evaluation. \par \par S/p gastric sleeve, lost 140 lbs -- now has no stomach, prior colon rxn for uncertain reason per patient. Vitamin levels stable. \par \par - Unsteady on feet, occasional falls, knees give out, PT in the past helped\par - no known fractures but height loss as above \par - Menses 11- 51 yo \par - Minimal walking and no weight bearing exercise \par - Dietary Ca, Vit D?\par - mother with OP traumatic fx \par - no tobacco, no ETOH, no steroids\par - dental UTD\par \par -----------\par 1/20/21 -- Some anemia to Hgb 8.3, will be getting a transfusion potentially, will see GI as unsure of why she is anemic, not symptomatic at present. Has not had my labs done yet or started PT. No recent falls, overall feels well.

## 2021-07-20 NOTE — PROGRESS NOTE ADULT - SUBJECTIVE AND OBJECTIVE BOX
After Your EGD                               (Esophagogastroduodenoscopy) Instructions    DIET:  · Resume your usual diet.    ACTIVITY:  · Rest quietly at home for the remainder of the day. You may resume normal activities tomorrow.  · Do not do anything that requires coordination the day of the procedure, such as climbing ladders, using a sharp knife, operating machinery, driving.  · May Resume driving and/or operating machinery in 24 hours  · May shower tomorrow   · Do not drive 24 hours.     WHAT TO EXPECT:  · Mild abdominal discomfort, bloating and gas pains.  · A scant amount of rectal bleeding is normal following a biopsy, polypectomy or if you have hemorrhoids.  · Mild throat soreness.  Warm salt-water gargle or lozenges may relieve your discomfort.  · Return of your usual bowel movements in 1-2 days.  · A tired feeling after the procedure due to the medications given to help you relax.    PROBLEMS TO WATCH FOR - NOTIFY YOUR SURGEON IF YOU HAVE ANY OF THESE SYMPTOMS:  · Severe abdominal pain or bloating.  · Difficulty swallowing or breathing  · Neck swelling  · Excessive pain   · Chills or temperature over 101 degrees.  · Large amounts of bright red rectal bleeding, blood clots or black colored stool.  · Nausea or Vomiting of blood or black colored liquid.      FOLLOW -UP:  · If you had a polyp removed or biopsy performed, these specimens will be sent to the pathology laboratory. It takes between 5-7 business days to get results.   Someone will either call or send a letter with your pathology results.  If you have not heard back in 7 days, please call the office your doctor's office.    Follow For Your Well Being Safety (given and explained)  Follow For Your Well Being Care After Anesthesia or Sedation (given and explained)      If you have any questions or concerns, or if you do not hear from your doctor within 30 days please contact the office.           COLONOSCOPY      DIET:  Resume your usual  INTERVAL HPI/OVERNIGHT EVENTS:    Patient seen and examined.  S/P gallium scan.  Reports her myalgias are improved.  No other complaints  Had central line replaced    CONSTITUTIONAL:  Negative fever or chills, feels well, good appetite  EYES:  Negative  blurry vision or double vision  CARDIOVASCULAR:  Negative for chest pain or palpitations  RESPIRATORY:  Negative for cough, wheezing, or SOB   GASTROINTESTINAL:  Negative for nausea, vomiting, diarrhea, constipation, or abdominal pain  GENITOURINARY:  Negative frequency, urgency or dysuria  NEUROLOGIC:  No headache, confusion, dizziness, lightheadedness  MUSCULOSKELETAL:  + myalgias      ANTIBIOTICS/RELEVANT:    MEDICATIONS  (STANDING):  bismuth subsalicylate Liquid 30 milliLiter(s) Oral daily  calcitriol Injectable 1 MICROGram(s) IV Push <User Schedule>  collagenase Ointment 1 Application(s) Topical daily  cyanocobalamin Injectable 1000 MICROGram(s) SubCutaneous every 7 days  Dakins Solution - 1/2 Strength 1 Application(s) Topical daily  dextrose 5%. 1000 milliLiter(s) (50 mL/Hr) IV Continuous <Continuous>  dextrose 50% Injectable 25 Gram(s) IV Push once  dextrose 50% Injectable 12.5 Gram(s) IV Push once  diazepam    Tablet 5 milliGRAM(s) Oral at bedtime  diphenhydrAMINE   Injectable 50 milliGRAM(s) IV Push at bedtime  enoxaparin Injectable 30 milliGRAM(s) SubCutaneous two times a day  escitalopram 20 milliGRAM(s) Oral daily  gabapentin 100 milliGRAM(s) Oral two times a day  heparin  flush 100 Units/mL Injectable 100 Unit(s) IV Push every other day  heparin  flush 100 Units/mL Injectable 100 Unit(s) IV Push every other day  insulin lispro (HumaLOG) corrective regimen sliding scale   SubCutaneous every 6 hours  lidocaine   Patch 1 Patch Transdermal daily  losartan 50 milliGRAM(s) Oral daily  nystatin Powder 1 Application(s) Topical two times a day  octreotide  Injectable 450 MICROGram(s) IV Push daily  oxybutynin 10 milliGRAM(s) Oral two times a day  pantoprazole  Injectable 40 milliGRAM(s) IV Push daily  Parenteral Nutrition - Adult 1 Each (73 mL/Hr) TPN Continuous <Continuous>  Parenteral Nutrition - Adult 1 Each (73 mL/Hr) TPN Continuous <Continuous>  sodium chloride 0.9% lock flush 20 milliLiter(s) IV Push once  vancomycin  IVPB 750 milliGRAM(s) IV Intermittent every 12 hours    MEDICATIONS  (PRN):  acetaminophen   Tablet. 325 milliGRAM(s) Oral every 4 hours PRN Moderate Pain (4 - 6)  ondansetron Injectable 4 milliGRAM(s) IV Push every 6 hours PRN Nausea and/or Vomiting  sodium chloride 0.9% lock flush 10 milliLiter(s) IV Push every 1 hour PRN After each medication administration  sodium chloride 0.9% lock flush 10 milliLiter(s) IV Push every 12 hours PRN Lumen of catheter NOT used        Vital Signs Last 24 Hrs  T(C): 36.4 (03 Dec 2017 05:36), Max: 36.9 (02 Dec 2017 17:37)  T(F): 97.6 (03 Dec 2017 05:36), Max: 98.5 (02 Dec 2017 17:37)  HR: 71 (03 Dec 2017 05:36) (71 - 80)  BP: 137/85 (03 Dec 2017 05:36) (134/85 - 148/85)  BP(mean): --  RR: 17 (03 Dec 2017 05:36) (17 - 17)  SpO2: 95% (03 Dec 2017 05:36) (95% - 96%)    PHYSICAL EXAM:  Constitutional: chronically ill, non-toxic  Eyes:CL, EOMI  Ear/Nose/Throat: no oral lesion, no sinus tenderness on percussion	  Neck:  supple  Respiratory: CTA eileen  Cardiovascular: S1S2 RRR, no murmurs  Gastrointestinal:soft, colostomy, wound appears clean  Extremities:no edema  Vascular: DP Pulse:	right normal; left normal      LABS:                        11.0   5.3   )-----------( 291      ( 03 Dec 2017 06:46 )             37.0     12-03    143  |  104  |  40<H>  ----------------------------<  119<H>  4.4   |  29  |  0.63    Ca    10.2      03 Dec 2017 06:46  Phos  3.8     12-03  Mg     2.2     12-03    vanco trough:  11.4        MICROBIOLOGY:  Culture - Blood (11.28.17 @ 08:18)    Specimen Source: .Blood Blood    Culture Results:   No growth at 5 days.    Culture - Blood (11.20.17 @ 20:45)    -  Clindamycin: R <=0.5    Gram Stain:   Anaerobic Bottle: Gram Positive Cocci in Clusters  Result called to and read back by_ Ms. BRAD Mccormack RN  11/21/2017  20:35:55    -  Penicillin: R 8    -  Daptomycin: S <=0.5    -  Vancomycin: S 1    -  Cefazolin: R 16    -  Erythromycin: R >4    -  Linezolid: S 2    -  Oxacillin: R >2    -  RIF- Rifampin: S <=1    -  Tetra/Doxy: S <=4    -  Trimethoprim/Sulfamethoxazole: S <=0.5/9.5    Specimen Source: .Blood Blood    Organism: Methicillin resistant Staphylococcus aureus    Organism: Methicillin resistant Staphylococcus aureus    Culture Results:   Growth in anaerobic bottle: Methicillin resistant Staphylococcus aureus  Floor previously notified.  Aerobic Bottle: No growth    Organism Identification: Methicillin resistant Staphylococcus aureus  Methicillin resistant Staphylococcus aureus    Method Type: ALISHA    Method Type: ETEST      RADIOLOGY & ADDITIONAL STUDIES:  gallium scan: pending diet.      ACTIVITY:  · Rest quietly at home for the remainder of the day. You may resume normal activities tomorrow.  · Do not do anything that requires coordination the day of the procedure, such as climbing ladders, using a sharp knife or operating machinery.   · Do not drive until tomorrow morning.                       · Resume normal medications.       WHAT TO EXPECT:  · Mild abdominal discomfort, bloating and gas pains.     · A scant amount of rectal bleeding following a biopsy, polypectomy or if you have hemorrhoids, is normal.    · Return of your usual bowel movements in 1-2 days.    · If  you had a polyp removed or biopsy performed, these specimens will be sent to the pathology laboratory.        Patient/Family given For your Well Being Teaching Sheet:  _x__ Care After Anesthesia/ Sedation  _x__ Pain Management Tips  ___ About Surgical Site infection  ___ Smoking and second hand Smoke information  ___ Other:       PROBLEMS TO WATCH FOR--NOTIFY YOUR SURGEON IF YOU HAVE ANY OF THESE SYMPTOMS:    1. Severe abdominal pain or bloating.     2. Chills or temperature over 101 degrees.    3. Large amount of bright red rectal bleeding, blood clots or black colored stool.    4. Vomiting blood or black colored liquid.         Recommendations  · Await pathology.   · Repeat colonoscopy in 10 years for screening.   · High fiber diet.     If you have any problems or questions concerning your surgical procedure, please do not hesitate to call your doctor's office or the day surgery dept [phone] (mon - fri)

## 2021-09-08 NOTE — PROGRESS NOTE ADULT - MINUTES
30
30
45
20
25
30
40
30
35
35
45
45
50
60
65
20
20
25
30
35
40
45
45
50
50
60
35
40
40
50
55
30
30
35
60
60
Yes
15
25
55
60
60
35
40

## 2021-09-10 PROBLEM — Z01.818 PREOP TESTING: Status: ACTIVE | Noted: 2021-01-01

## 2021-09-24 PROBLEM — Z01.818 PREOP TESTING: Status: ACTIVE | Noted: 2021-01-01

## 2021-09-30 NOTE — PROGRESS NOTE BEHAVIORAL HEALTH - NS ED BHA MED ROS NEUROLOGICAL
No complaints
Xray Chest 1 View- PORTABLE-Urgent
No complaints

## 2021-11-08 NOTE — ED ADULT TRIAGE NOTE - NS_BH TRG QUESTION1_ED_ALL_ED
Operative Note      Patient: Yung Mustafa  YOB: 1961  MRN: [id number]    Date of Procedure: 2021    Pre-Op Diagnosis: LUMBOSACRAL SPONDYLOSIS WITHOUT MYELOPATHY    Post-Op Diagnosis: Same       Procedure(s):  BILATERAL LUMBAR FACET INJECTION AT L5-S 1 FACETS BLOCK UNDER FLUOROSCOPIC GUIDANCE     Surgeon(s):  Lindsey Parikh MD    Assistant:   * No surgical staff found *    Anesthesia: Local    Estimated Blood Loss (mL): Minimal    Complications: None    Specimens:   * No specimens in log *    Implants:  * No implants in log *      Drains: * No LDAs found *    Findings: good needle placement    Detailed Description of Procedure:   2021    Patient: Yung Mustafa  :  1961  Age:  61 y.o. Sex:  female     PRE-OPERATIVE DIAGNOSIS:  Lumbar spondylosis, lumbar facet syndrome. POST-OPERATIVE DIAGNOSIS: Same. PROCEDURE:   Fluoroscopic guided lumbarfacet blocks Bilateral at Levels:  L5-S1. SURGEON: Lindsey Parikh MD    ANESTHESIA: Local    ESTIMATED BLOOD LOSS: None.  ______________________________________________________________________  BRIEF HISTORY:  Yung Mustafa comes in today for 1 fluoroscopic guided lumbar medial branch blocks  Bilateral  at Levels: L5-S1. The potential complications of this procedure were discussed with her again today. She has elected to undergo the aforementioned procedure. Bridgett complete History & Physical examination were reviewed in depth, a copy of which is in the chart. DESCRIPTION OF PROCEDURE:   After confirming written and informed consent, a time-out was performed and Bridgett name and date of birth, the procedure to be performed as well as the plan for the location of the needle insertion were confirmed. The patient was brought into the procedure room and placed in the prone position on the fluoroscopy table. Standard monitors were placed and vital signs were observed throughout the procedure.  The area of the lumbar spine
No

## 2021-11-19 NOTE — ED PROVIDER NOTE - NS ED NOTE AC HIGH RISK COUNTRIES
RT ASSESSMENT TREATMENT GOALS    [x]Pt Goal:  Pt will identify 1-2 positive coping skills by time of discharge. []Pt Goal:  Pt will identify 1-2 positive aspects of self by time of discharge. [x]Pt Goal:  Pt will remain on task/topic for 15-30 minutes during group by time of discharge. []Pt Goal:  Pt will identify 1-2 aspects of relapse prevention plan by time of discharge. []Pt Goal:  Pt will join in conversation with peers 1-2 times per group by time of discharge. []Pt Goal:  Pt will identify 1-2 new leisure interests by time of discharge. []Pt Goal:  Pt will not voice any delusional content by time of discharge. No

## 2021-12-08 NOTE — ED BEHAVIORAL HEALTH ASSESSMENT NOTE - NS ED BHA MED ROS GENITOURINARY
Assessment & Plan   Acute respiratory failure with hypoxia (H)  Patient presents for follow-up from hospitalization due to Covid pneumonia with a large burden of pulmonary emboli.  While his symptoms have improved since he has left the hospital he still very short of breath with any activity.  We had an initial telephone visit and it was difficult to assess his respiratory status so we had him come into clinic today.  On room air at rest he has vitals that are within normal limits.  However with exertion which is just a slow walk here in the clinic he drops down to 86% O2.  I discussed my recommendation of starting supplemental oxygen to be used with activity and sleep and the patient deferred this today.  He states that he does not do a whole lot in his home and he sleeps upright in a chair and does just fine.  I discussed that this could help his physical conditioning and help his muscles, heart, lungs and brain improve possibly more quickly but the patient again deferred.  He will continue to monitor symptoms and if he changes mind he can contact the for an order for supplemental O2.    Pneumonia due to 2019 novel coronavirus  Seems to be resolving well thus far.  He is hypoxic with activity but again he deferred oxygen as above.  He will continue to monitor symptoms and follow-up as needed for any new, concerning or worsening symptoms.    Multiple subsegmental pulmonary emboli without acute cor pulmonale (H)  Tolerating Eliquis well.  He should be on this for at least 3 months given his provoked pulmonary embolism.  However his symptoms are rather significant and that they are not completely resolved or nearly resolved at the 3-month abelino he may benefit from 6 months of anticoagulation.  We will follow-up with the patient at the 3-month abelino to determine his improvement and adjust his medications as appropriate.     BMI:   Estimated body mass index is 28.29 kg/m  as calculated from the following:    Height as  of 11/24/21: 1.829 m (6').    Weight as of this encounter: 94.6 kg (208 lb 9.6 oz).       Return in about 3 months (around 3/8/2022), or if symptoms worsen or fail to improve, for In-Clinic Visit.    Kristopher Penny PA-C  [facility]    Kori Eller is a 56 year old who presents for the following health issues     HPI     Post Discharge Outreach 11/30/2021   Admission Date 11/23/2021   Reason for Admission acute respiratory failure with hypoxia   Discharge Date 11/26/2021   Discharge Diagnosis acute respiratory failure with hypoxia   How are you doing now that you are home? Not much has changed since I left the hospital   How are your symptoms? (Red Flag symptoms escalate to triage hotline per guidelines) Unchanged   Do you feel your condition is stable enough to be safe at home until your provider visit? Yes   Does the patient have their discharge instructions?  Yes   Does the patient have questions regarding their discharge instructions?  No   Were you started on any new medications or were there changes to any of your previous medications?  No   Does the patient have all of their medications? Yes   Do you have questions regarding any of your medications?  No   Do you have all of your needed medical supplies or equipment (DME)?  (i.e. oxygen tank, CPAP, cane, etc.) Yes   Discharge follow-up appointment scheduled within 14 calendar days?  Yes   Discharge Follow Up Appointment Date 12/2/2021   Discharge Follow Up Appointment Scheduled with? Primary Care Provider     Hospital Follow-up Visit:    Hospital/Nursing Home/IP Rehab Facility: [facility]  Date of Admission: 11/23/2021  Date of Discharge: 11/26/2021  Reason(s) for Admission:  Acute respiratory failure with hypoxia, acute pulmonary embolism without  Acute cor pulmonale, Pneumonia due to 2019 novel coronavirus, Multiple subsegmental pulmonary emboli without acute cor pulmonale       Was your  hospitalization related to COVID-19? YES   How are you feeling today? Better  In the past 24 hours have you had shortness of breath when speaking, walking, or climbing stairs? My breathing issues have improved  Do you have a cough? Yes, I have a cough but it's not worse  When is the last time you had a fever greater than 100? unsure  Are you having any other symptoms? Fatigue-Says this is improving   Do you have any other stressors you would like to discuss with your provider? OTHER: Sensitivity to cold and feeling arthritic        PHQ Assesment Total Score(s) 3/28/2014   PHQ-2 Score 0   Some recent data might be hidden       No flowsheet data found.    Was the patient in the ICU or did the patient experience delirium during hospitalization?  No    Problems taking medications regularly:  None  Medication changes since discharge: None  Problems adhering to non-medication therapy:  None    Summary of hospitalization:  [facility] discharge summary reviewed  Diagnostic Tests/Treatments reviewed.  Follow up needed: none  Other Healthcare Providers Involved in Patient s Care:         None  Update since discharge: improved. Post Discharge Medication Reconciliation: discharge medications reconciled, continue medications without change.  Plan of care communicated with patient            Review of Systems   See HPI      Objective    /76 (BP Location: Right arm, Patient Position: Sitting, Cuff Size: Adult Large)   Pulse 68   Temp 97.4  F (36.3  C) (Tympanic)   Wt 94.6 kg (208 lb 9.6 oz)   SpO2 97%   BMI 28.29 kg/m    Body mass index is 28.29 kg/m .  Physical Exam   Constitutional: healthy, alert, and no distress  Head: Normocephalic. Atraumatic  Eyes: No conjunctival injection, sclera anicteric  Cardiovascular: RRR. No murmurs, clicks, gallops, or rubs. No peripheral edema.   Respiratory: No resp distress. Lungs CTAB bilaterally.   Musculoskeletal: extremities normal- no gross deformities noted, and  normal muscle tone  Skin: no suspicious lesions or rashes  Neurologic: Gait normal. CN 2-12 grossly intact  Psychiatric: mentation appears normal and affect normal/bright    Walking test-Patient O2 at 86% on room air          No complaints

## 2022-01-01 PROCEDURE — 99214 OFFICE O/P EST MOD 30 MIN: CPT | Mod: 95

## 2022-01-05 NOTE — PHYSICAL THERAPY INITIAL EVALUATION ADULT - DISCHARGE DISPOSITION, PT EVAL
For information on Fall & Injury Prevention, visit: [email or web address] OR  [email or web address] OR  https://www.cdc.gov/steadi/patient.html
home w/ assist

## 2022-03-30 NOTE — DATA REVIEWED
[FreeTextEntry1] : Paper records reviewed, scanned to EMR. Pertinent labs and imaging summarized in HPI. 
wheelchair

## 2022-06-05 NOTE — PROGRESS NOTE ADULT - ASSESSMENT
"Pt to eob for breakfast. Pt reminded to not stand or get up without staff present. Made comment \"not that you know of\". Call light remains in reach.   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 Duonebs.   FEN/GI: Mechanical soft diet. J-tube TF 53cc/hr Osmolite 1.2. Protonix. Zofran PRN. Vit C, MVT.  Zinc sulfate.  Multivitamin.  Rectal tube.  : stg III sacral ulcer, voids/incont   Endo: ISS  ID: C. diff: Vancomycin PO (1/7) MDR Pseudomonas (sputum and peritoneal fluid) - colistin (12/31- ), meropenem (12/31- ), INH tobramycin (12/31-1/14) \\\ DC: linezolid (1/7-1/9) IV tobra (1/3, 1/4, 1/6), gent (12/26-) Imipenem (12/14--), Vanc (12/13-14), Zosyn (12/3-14), Linezolid (12/24-25),  fluconazole (12/4-25), - H. simplex Acyclovir (12/20-25), micafungin (12/31-1/10 ),   Heme: SCDs, lovenox 80 bid,   Lines/drains: L flank CHECO (1/6-), Feeding J-tube.  Wounds: Midline abdominal wound, change wound vac MWF   Benadryl PRN for itching.

## 2022-06-07 NOTE — ED PROVIDER NOTE - TOBACCO USE
Former smoker Split-Thickness Skin Graft Text: Because of the size and thickness of the defect, and to provide coverage and facilitate healing with minimal contraction, a split-thickness skin graft was planned.  The donor site was marked and the graft harvested by scalpel, Weck blade, or dermatome.  The graft was then trimmed to fit the defect.  It was sutured into place and a bolster dressing applied.

## 2022-06-14 NOTE — CONSULT NOTE ADULT - SUBJECTIVE AND OBJECTIVE BOX
Consult Note Critical Care    HPI:  56F w/PMHx of HTN, gastric bypass in  c/b stricture, corkscrewed sleeve and chronic leak, revised on 16 with protracted (70-day) postoperative course complicated by MDR infections, a C-diff infection, respiratory compromise due to plugging/PNA/effusions requiring multiple interventions and a trach, as well as a continued leak requiring a draining double-pigtail with stenting performed by GI. 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 Denies CP, SOB, N/V, diarrhea. (2017 14:15)    patient with pseudomonas in prior cultures ,yeast  an VRE      PAST MEDICAL & SURGICAL HISTORY:  Reflux  Obesity  DVT (deep venous thrombosis):  neck  Diverticulitis  Hypertension  Elective surgery: abodominal wall surgery  dec 2016  Elective surgery: 2016 gastric bypass revision  Gastric bypass status for obesity: gastric sleeve, 2012  S/P breast biopsy: , left  S/P colon resection:   S/P knee surgery: repair ,   right; left  Umbilical hernia:   S/P appendectomy:   S/P tonsillectomy:       REVIEW OF SYSTEMS:    patient without specific compalints    MEDICATIONS  (STANDING):  insulin lispro (HumaLOG) corrective regimen sliding scale   SubCutaneous Before meals and at bedtime  dextrose 5%. 1000 milliLiter(s) (50 mL/Hr) IV Continuous <Continuous>  dextrose 50% Injectable 25 Gram(s) IV Push once  sodium chloride 0.9% lock flush 20 milliLiter(s) IV Push once  cyanocobalamin Injectable 1000 MICROGram(s) SubCutaneous every 7 days  dextrose 50% Injectable 12.5 Gram(s) IV Push once  linezolid  IVPB   IV Intermittent   heparin  Injectable 7500 Unit(s) SubCutaneous every 8 hours  albumin human 25% IVPB 50 milliLiter(s) IV Intermittent every 8 hours  Parenteral Nutrition - Adult 1 Each (52 mL/Hr) TPN Continuous <Continuous>  fat emulsion 20% Infusion 50 Gram(s) (20.83 mL/Hr) IV Continuous <Continuous>  micafungin IVPB 100 milliGRAM(s) IV Intermittent once  micafungin IVPB   IV Intermittent   linezolid  IVPB   IV Intermittent   colistimethate IVPB 150 milliGRAM(s) IV Intermittent every 12 hours  linezolid  IVPB 600 milliGRAM(s) IV Intermittent once    MEDICATIONS  (PRN):  ondansetron Injectable 4 milliGRAM(s) IV Push every 6 hours PRN Nausea  sodium chloride 0.9% lock flush 10 milliLiter(s) IV Push every 1 hour PRN After each medication administration  sodium chloride 0.9% lock flush 10 milliLiter(s) IV Push every 12 hours PRN Lumen of catheter NOT used      linezolid  IVPB   IV Intermittent   micafungin IVPB 100 milliGRAM(s) IV Intermittent once  micafungin IVPB   IV Intermittent   linezolid  IVPB   IV Intermittent   colistimethate IVPB 150 milliGRAM(s) IV Intermittent every 12 hours  linezolid  IVPB 600 milliGRAM(s) IV Intermittent once      Allergies    sulfa drugs (Unknown)  sulfamethoxazole (Other)    Intolerances        SOCIAL HISTORY:    FAMILY HISTORY:  No pertinent family history in first degree relatives      Vital Signs Last 24 Hrs  T(C): 37.4 (2017 09:00), Max: 37.4 (2017 09:00)  T(F): 99.4 (2017 09:00), Max: 99.4 (2017 09:00)  HR: 114 (2017 12:00) (80 - 122)  BP: 146/67 (2017 12:00) (77/43 - 165/85)  BP(mean): 97 (2017 12:00) (55 - 118)  RR: 36 (2017 12:00) (17 - 41)  SpO2: 92% (2017 12:00) (89% - 97%)    PHYSICAL EXAM:    patient appears septic  Eyes: PERRL, EOM intact; conjunctiva and sclera clear  Head: Normocephalic; atraumatic  ENMT: No nasal discharge; airway clear  Neck: Supple; non tender; no masses  Respiratory: No wheezes, rales or rhonchi  Cardiovascular: Regular rate and rhythm. S1 and S2 Normal; No murmurs, gallops or rubs  Gastrointestinal: Soft non-tender non-distended; Normal bowel sounds; No hepatosplenomegaly  Genitourinary: No costovertebral angle tenderness  Extremities: Normal range of motion, No clubbing, cyanosis or edema  Vascular: Peripheral pulses palpable 2+ bilaterally  Neurological: Alert and oriented x3  Skin: Warm and dry. No acute rash  Lymph Nodes: No acute cervical adenopathy  Musculoskeletal: Normal gait, tone, without deformities    LABS:                        9.7    4.4   )-----------( 95       ( 2017 04:22 )             29.6     -    139  |  102  |  44<H>  ----------------------------<  95  3.8   |  26  |  1.10    Ca    8.4      2017 04:22  Phos  4.0       Mg     2.7         TPro  x   /  Alb  1.4<L>  /  TBili  x   /  DBili  x   /  AST  x   /  ALT  x   /  AlkPhos  x         Urinalysis Basic - ( 2017 13:02 )    Color: Yellow / Appearance: Cloudy / S.015 / pH: x  Gluc: x / Ketone: NEGATIVE  / Bili: NEGATIVE / Urobili: 0.2 E.U./dL   Blood: x / Protein: Trace mg/dL / Nitrite: NEGATIVE   Leuk Esterase: NEGATIVE / RBC: < 5 /HPF / WBC 5-10 /HPF   Sq Epi: x / Non Sq Epi: Few /HPF / Bacteria: Present /HPF        RADIOLOGY & ADDITIONAL STUDIES: Statement Selected

## 2022-08-07 NOTE — ED ADULT NURSE NOTE - CAS TRG GENERAL NORM CIRC DET
Anesthesia Pre Eval Note    Anesthesia ROS/Med Hx        Anesthetic Complication History:  Patient does not have a history of anesthetic complications      Pulmonary Review:  Patient does not have a pulmonary history      Neuro/Psych Review:  Patient does not have a neuro/psych history       Cardiovascular Review:  Patient does not have a cardiovascular history       GI/HEPATIC/RENAL Review:  Patient does not have a GI/hepatic/renalhistory       End/Other Review:  Patient does not have an endo/other history        Relevant Problems   No relevant active problems       Physical Exam     Airway     Unable to Examine: Uncooperative    Cardiovascular  Cardiovascular exam normal  Cardio Rhythm: Regular  Cardio Rate: Normal    Head Assessment  Head assessment: Atraumatic and Normocephalic    General Assessment  General Assessment: No acute distress    Dental Exam    Dental Note: Expected dentition for  13 month old    Pulmonary Exam    Breath sounds clear to auscultation:  Yes  Patient Demonstrates:  Non-labored Breathing    Abdominal Exam  Abdominal exam normal      Anesthesia Plan:    ASA Status: 1  Anesthesia Type: General    Induction: Inhalational  Preferred Airway Type: Mask  Maintenance: Inhalational  Premedication: None      Post-op Pain Management: Per Surgeon      Checklist  Reviewed: Lab Results, Past Med History, NPO Status, Allergies, Medications and Problem list  Consent/Risks Discussed Statement:  The proposed anesthetic plan, including its risks and benefits, have been discussed with the Mother along with the risks and benefits of alternatives. Questions were encouraged and answered and the patient and/or representative understands and agrees to proceed.    I have discussed elements of the patient's history or examination, as noted above and/or as follows, that place the patient at higher risk of complications; age.    I discussed with the patient (and/or patient's legal representative) the risks and  benefits of the proposed anesthesia plan, General, which may include services performed by other anesthesia providers.    Alternative anesthesia plans, if available, were reviewed with the patient (and/or patient's legal representative). Discussion has been held with the patient (and/or patient's legal representative) regarding risks of anesthesia, which include allergic reaction, emergence delirium, need for blood transfusion, anxiety, eye injury, nerve injury, aspiration, headache, oral injury, hypotension, organ damage, back pain, bleeding/hematoma, ICU admit, persistent pain, infection, conversion to general anesthesia, intra-operative awareness, post-op intubation, sore throat, vomiting, nausea, memory loss, dental injury, depressed breathing and death and emergent situations that may require change in anesthesia plan.    The patient (and/or patient's legal representative) has indicated understanding, his/her questions have been answered, and he/she wishes to proceed with the planned anesthetic.    Blood Products: Not Anticipated     Strong peripheral pulses

## 2022-08-24 NOTE — PRE-OP CHECKLIST - CHLOROHEXIDINE WASH IN ASU
28-Nov-2016 06:49 Double O-Z Flap Text: The defect edges were debeveled with a #15 scalpel blade.  Given the location of the defect, shape of the defect and the proximity to free margins a Double O-Z flap was deemed most appropriate.  Using a sterile surgical marker, an appropriate transposition flap was drawn incorporating the defect and placing the expected incisions within the relaxed skin tension lines where possible. The area thus outlined was incised deep to adipose tissue with a #15 scalpel blade.  The skin margins were undermined to an appropriate distance in all directions utilizing iris scissors.

## 2022-09-01 NOTE — PATIENT PROFILE ADULT. - PATIENT REPRESENTATIVE: ( YOU CAN CHOOSE ANY PERSON THAT CAN ASSIST YOU WITH YOUR HEALTH CARE PREFERENCES, DOES NOT HAVE TO BE A SPOUSE, IMMEDIATE FAMILY OR SIGNIFICANT OTHER/PARTNER)
Declines Bilobed Transposition Flap Text: The defect edges were debeveled with a #15 scalpel blade.  Given the location of the defect and the proximity to free margins a bilobed transposition flap was deemed most appropriate.  Using a sterile surgical marker, an appropriate bilobe flap drawn around the defect.    The area thus outlined was incised deep to adipose tissue with a #15 scalpel blade.  The skin margins were undermined to an appropriate distance in all directions utilizing iris scissors.

## 2022-10-19 NOTE — H&P PST ADULT - SKIN
Abdominal Pain   Associated symptoms include a fever and melena. Melena   Associated symptoms include a fever and abdominal pain. Associated symptoms include a fever and abdominal pain. 8y M here with abd pain, fever, blood in stool. Abd pain x 1 month. Had blood in stool 2 weeks ago then again in the past 2 days. Fever started yesterday (99.9). pain in belly is constant. Decreased appetite. No vomiting. No sick contacts. Saw GI 2 days ago - thought maybe infectious with plan to check stool and follow-up. Mom called them today and advised to come to the ED for CT imaging. History reviewed. No pertinent past medical history. History reviewed. No pertinent surgical history. History reviewed. No pertinent family history. Social History     Socioeconomic History    Marital status: SINGLE     Spouse name: Not on file    Number of children: Not on file    Years of education: Not on file    Highest education level: Not on file   Occupational History    Not on file   Tobacco Use    Smoking status: Not on file    Smokeless tobacco: Not on file   Substance and Sexual Activity    Alcohol use: Not on file    Drug use: Not on file    Sexual activity: Not on file   Other Topics Concern    Not on file   Social History Narrative    Not on file     Social Determinants of Health     Financial Resource Strain: Not on file   Food Insecurity: Not on file   Transportation Needs: Not on file   Physical Activity: Not on file   Stress: Not on file   Social Connections: Not on file   Intimate Partner Violence: Not on file   Housing Stability: Not on file         ALLERGIES: Patient has no known allergies. Review of Systems   Constitutional:  Positive for fever. Gastrointestinal:  Positive for abdominal pain and melena. Review of Systems   Constitutional: (-) weight loss. HEENT: (-) stiff neck   Eyes: (-) discharge. Respiratory: (-) cough. Cardiovascular: (-) syncope. Gastrointestinal: (+) blood in stool. Genitourinary: (-) hematuria. Musculoskeletal: (-) myalgias. Neurological: (-) seizure. Skin: (-) petechiae  Lymph/Immunologic: (-) enlarged lymph nodes  All other systems reviewed and are negative. Vitals:    10/19/22 1218   BP: 113/69   Pulse: 65   Resp: 18   Temp: 97.9 °F (36.6 °C)   SpO2: 100%            Physical Exam Physical Exam   Nursing note and vitals reviewed. Constitutional: Appears well-developed and well-nourished. active. No distress. Head: normocephalic, atraumatic  Ears:  No pain with external manipulation of the ear. No mastoid tenderness or swelling. Nose: Nose normal. No nasal discharge. Mouth/Throat: Mucous membranes are moist. No tonsillar enlargement, erythema or exudate. Uvula midline. Eyes: Conjunctivae are normal. Right eye exhibits no discharge. Left eye exhibits no discharge. PERRL bilat. Neck: Normal range of motion. Neck supple. No focal midline neck pain. No cervical lympadenopathy. Cardiovascular: Normal rate, regular rhythm, S1 normal and S2 normal.    No murmur heard. 2+ distal pulses with normal cap refill. Pulmonary/Chest: No respiratory distress. No rales. No rhonchi. No wheezes. Good air exchange throughout. No increased work of breathing. No accessory muscle use. Abdominal: soft and non-tender. No rebound or guarding. No hernia. No organomegaly. Back: no midline tenderness. No stepoffs or deformities. No CVA tenderness. Extremities/Musculoskeletal: Normal range of motion. no edema, no tenderness, no deformity and no signs of injury. distal extremities are neurovasc intact. Neurological: Alert. normal strength and sensation. normal muscle tone. Skin: Skin is warm and dry. Turgor is normal. No petechiae, no purpura, no rash. No cyanosis. No mottling, jaundice or pallor. MDM  8y M here with abd pain and blood in stool. Sent in for imaging. Will check labs and CT.        Procedures No lesions; no rash

## 2022-11-01 NOTE — ED ADULT NURSE NOTE - WOUND SIZE #1
size of q tip Thalidomide Pregnancy And Lactation Text: This medication is Pregnancy Category X and is absolutely contraindicated during pregnancy. It is unknown if it is excreted in breast milk.

## 2022-11-12 NOTE — PROGRESS NOTE ADULT - SUBJECTIVE AND OBJECTIVE BOX
O/N: BRAYDEN, VSS, new central line placed  12/1: [facility] called for new subclavian; Gallium scan ordered; CK >1000; DC daptomycin; vancomycin restarted    STATUS POST:   7/24: SBR resection, fistula resection, revision of esophagogegunostomy drain through esophageal hiatus in R mediastinum  7/29: Ex-lap, SB anastamosis in BP limb breakdown with succus throughout abdomen  8/1: abd washout, drainage of abscess, biologic mesh placement, closure of abdominal wall, vac and retention suture  8/7: abd washout, mesh removal, frozen abd noted, LLQ CHECO replaced with benson drain  8/10: leak noted from previous anastamosis site on L side, mid abdomen malecot drain placed in enterotomy, JPs x 2 placed, necrotic fascia debrided, vicryl mesh sutured to fascia circumferentially, xeroform over mesh then vac dressing placed O/N: BRAYDEN, VSS, new central line placed  12/1: Grant called for new subclavian; Gallium scan ordered; CK >1000; DC daptomycin; vancomycin restarted    STATUS POST:   7/24: SBR resection, fistula resection, revision of esophagogegunostomy drain through esophageal hiatus in R mediastinum  7/29: Ex-lap, SB anastamosis in BP limb breakdown with succus throughout abdomen  8/1: abd washout, drainage of abscess, biologic mesh placement, closure of abdominal wall, vac and retention suture  8/7: abd washout, mesh removal, frozen abd noted, LLQ CHECO replaced with benson drain  8/10: leak noted from previous anastamosis site on L side, mid abdomen malecot drain placed in enterotomy, JPs x 2 placed, necrotic fascia debrided, vicryl mesh sutured to fascia circumferentially, xeroform over mesh then vac dressing placed	      Vital Signs Last 24 Hrs  T(C): 36.9 (02 Dec 2017 05:42), Max: 37.1 (01 Dec 2017 09:12)  T(F): 98.4 (02 Dec 2017 05:42), Max: 98.7 (01 Dec 2017 09:12)  HR: 76 (02 Dec 2017 05:42) (73 - 90)  BP: 161/107 (02 Dec 2017 05:42) (122/87 - 161/107)  BP(mean): --  RR: 16 (02 Dec 2017 05:42) (16 - 17)  SpO2: 94% (02 Dec 2017 05:42) (94% - 97%)      I&O's Summary    01 Dec 2017 07:01  -  02 Dec 2017 07:00  --------------------------------------------------------  IN: 2164 mL / OUT: 150 mL / NET: 2014 mL    Gen: NAD   Abd: soft, nt /nd (1) Outpatient Area

## 2022-11-22 NOTE — PROGRESS NOTE ADULT - SUBJECTIVE AND OBJECTIVE BOX
REFILL REQUEST FROM PHARMACY                SUBJECTIVE: Pt seen and examined at bedside. o/n: wet bed 4 times overnight, seemed to pass ToV.     Vital Signs Last 24 Hrs  T(C): 36.2 (2018 06:32), Max: 37.2 (2018 19:27)  T(F): 97.1 (2018 06:32), Max: 99 (2018 19:27)  HR: 76 (2018 08:00) (62 - 80)  BP: 186/93 (2018 08:00) (116/63 - 189/86)  BP(mean): 134 (2018 08:00) (87 - 144)  RR: 25 (2018 08:00) (13 - 26)  SpO2: 98% (2018 08:00) (94% - 100%)    PHYSICAL EXAM    General: NAD  HEENT: NC/AT, EOMI, normal hearing  Pulmonary: Nonlabored breathing, no respiratory distress, CTA-B, 3L NC  Cardiovascular: NSR, no murmurs  Abdominal: soft, NT/ND, -BS, no organomegaly, midline wound vac w/dressing CDI, b/l low abd CHECO seosang  Extremities: WWP  Neuro: A/O x3, normal motor/sensation, no focal deficits, pain controlled  Pulses: palpable distal pulses    I&O's Detail    2018 07:01  -  2018 07:00  --------------------------------------------------------  IN:    sodium chloride 0.9%.: 2400 mL    Solution: 100 mL    Solution: 600 mL  Total IN: 3100 mL    OUT:    Bulb: 140 mL    Bulb: 20 mL    Indwelling Catheter - Urethral: 1430 mL  Total OUT: 1590 mL    Total NET: 1510 mL      2018 07:01  -  2018 08:20  --------------------------------------------------------  IN:    sodium chloride 0.9%.: 100 mL  Total IN: 100 mL    OUT:    Bulb: 80 mL    Bulb: 20 mL  Total OUT: 100 mL    Total NET: 0 mL          LABS:                        6.4    8.7   )-----------( 203      ( 2018 07:20 )             19.5     -    139  |  101  |  16  ----------------------------<  68<L>  3.8   |  22  |  1.07    Ca    8.3<L>      2018 06:14  Phos  2.5     -  Mg     1.8           PT/INR - ( 2018 14:19 )   PT: 14.2 sec;   INR: 1.27          PTT - ( 2018 14:19 )  PTT:39.3 sec  Urinalysis Basic - ( 2018 10:55 )    Color: Yellow / Appearance: Clear / S.010 / pH: x  Gluc: x / Ketone: NEGATIVE  / Bili: Negative / Urobili: 0.2 E.U./dL   Blood: x / Protein: NEGATIVE mg/dL / Nitrite: NEGATIVE   Leuk Esterase: NEGATIVE / RBC: < 5 /HPF / WBC < 5 /HPF   Sq Epi: x / Non Sq Epi: 0-5 /HPF / Bacteria: Present /HPF        MEDICATIONS  (STANDING):  diazepam    Tablet 5 milliGRAM(s) Oral at bedtime  escitalopram 20 milliGRAM(s) Oral daily  heparin  flush 100 Units/mL Injectable 100 Unit(s) IV Push every other day  heparin  Injectable 5000 Unit(s) SubCutaneous every 8 hours  insulin lispro (HumaLOG) corrective regimen sliding scale   SubCutaneous Before meals and at bedtime  magnesium sulfate  IVPB 1 Gram(s) IV Intermittent once  micafungin IVPB 100 milliGRAM(s) IV Intermittent every 24 hours  piperacillin/tazobactam IVPB. 2.25 Gram(s) IV Intermittent every 6 hours  potassium phosphate IVPB 15 milliMole(s) IV Intermittent once  sodium chloride 0.9%. 1000 milliLiter(s) (100 mL/Hr) IV Continuous <Continuous>  sodium chloride 3%  Inhalation 4 milliLiter(s) Inhalation once    MEDICATIONS  (PRN):  labetalol Injectable 10 milliGRAM(s) IV Push every 2 hours PRN Systolic blood pressure >160  ondansetron Injectable 4 milliGRAM(s) IV Push every 6 hours PRN Nausea and/or Vomiting      RADIOLOGY & ADDITIONAL STUDIES:    ASSESSMENT AND PLAN  57 F s/p  esophagojejunostomy revision c/b EC fistula now s/p medical optimization of EC fistula and return to OR for EC fistula take down and subsequent revision of her aamir-en-y ()    Neuro: acetaminophen PRN,  zofran prn Lexapro 20 and diazapam 5 qhs  CV: MAP goal 65,labetalol prn  Pulm: NC  FENGI: NPO. IVF, PPI, NS@100  : Yu  Endo: ISS  ID: zosyn (-), melany (-), /// discontinued Vanc (-)   Ppx: SCDs, SQH  Lines: PICC line to be exchanged, R radial Radha  Wounds: midline wound with prevena Vac. SubQ CHECO x 2

## 2022-12-05 NOTE — ED ADULT TRIAGE NOTE - TEMPERATURE IN CELSIUS (DEGREES C)
Render Risk Assessment In Note?: no Detail Level: Simple Additional Notes: Used for 2 weeks, stopped 1 week ago. Recommended another treatment in 2 months. 37.2

## 2022-12-14 NOTE — H&P PST ADULT - NSCAFFEINETYPE_GEN_ALL_CORE_SD
12/14/2022      Kuldeep Mahmood  [address]  [facility] [address]    Dear Mr. Mahmood,    Your procedure is scheduled with Dr. Geovani Langford on January 18, 2023 at:      [facility]  [address] [id number]    Please enter the main entrance and check in with Registration, which will be on the right.    Please register at [facility].  You can expect to be contacted by the hospital a few days beforehand to confirm your arrival and procedure time.      The following appointment(s) have been scheduled for you:    · Pre-Procedure / Pre-Surgery COVID-19 Testing:  We have you scheduled for your COVID-19 Testing Visit on: MONDAY January 16, 2023 at 1:30 pm  for our ACL Lab site located at [place] ([address]  [address]).    If you need to reschedule this appointment, please call our office ASAP for assistance.    Once you have completed the COVID screening you will be STRONGLY RECOMMENDED to self-isolate/quarantine at home and limit all contact as much as possible to prevent any potential infection between the time of the test and the time of surgery.  Masking and Social Distancing continue to be crucial.    · Post-op Appointment with Cathi Werner NP for Dr. Langford at the [facility] ([address] [place] [address]) on February 6, 2023  at 1:20 pm .        To better prepare for your surgery, please follow these instructions:    • Please schedule an appointment with your Primary Care Physician for a PreOperative History and Physical for 2-3 weeks before your surgery date.   This is a pre-operative requirement for medical clearance for surgery/anesthesia. Your primary care physician will perform a history and physical and order lab work to review, ensuring there is not any other medical concern that would prevent safely proceeding with surgery.  We will  send them the information about your planned procedure and what testing we are looking for (and where to send it to).  If you haven't already done so, please call them ASAP to schedule an appointment. Please call Teresa surgery scheduler at [phone], when your appointment has been scheduled with your primary care physician. It is OK to leave a voicemail with this information (date, time, and name of your doctor).    • Starting 10 days prior to your surgery, please do not take any Phentermine or other diet/weight loss products.  Continuing these medications in the 10 days before surgery will likely result in postponement due to anesthesia requirements.  Please consult with your prescribing physicians if you have any questions.     • Starting 3 days prior to your surgery, please do not take any aspirin products, anti-inflammatory medication or blood thinners.  This includes products such as Dary-Speed, Pepto Bismol, Motrin, Ibuprofen and Advil should also be avoided.  (Tylenol products are aspirin free, so Tylenol products are OK to take for pain.).      • If you take any other prescription medication, including blood thinners such as coumadin or Plavix, please contact your ordering or primary care physician ASAP, so that you can inform them about your upcoming surgery and so that they can decide with you if any changes to your medication schedule are necessary.  If approved by your physician, you may take blood pressure and heart medications the morning of the procedure with a small amount of water.      • Do not have anything to eat or drink starting at midnight the night before your surgery.      • For your safety, must have a ride home after surgery, due to both anesthesia and post-op pain medication.  This must be with someone who can take responsibility for you and assist with your discharge from the surgery center, not a cab, bus, etc.  You will need someone available to remain with you up to 24 hours  after you have been discharged.      • Please remember that all times are subject to change as the hospital coordinates the schedule to meet the needs of your surgery and the overall flow of the OR that day.  You will be called ASAP to advise you of any changes to your surgery time or the time you need to arrive for your surgery.      You will receive an invitation via email from [facility] to view Lizabeth.    This informative web-based education program will give you helpful information pertaining to your upcoming surgery. If you do not have an active email, you may contact Lizabeth at [phone] to obtain an access code.       If you have any work related and/or disability forms that need to be completed, please contact the Forms Completion Department at [phone]. Forms can be dropped off at any of our [facility] locations. Please be advised that it can take 7 to 14 business days to complete these requests.    If you have questions regarding the procedure, medications, rehab, etc., please contact the nursing staff at [phone]    If you have any scheduling questions or need to reschedule, please contact me at the telephone number and extension listed below.     Thank you,    Teresa at [phone]  Surgery Scheduler for Dr. Geovani Langford  [facility]      \"Help us grow our quality of service. We want to improve - and you can help us. You may receive a survey either in the mail or via e-mail. This is your opportunity to tell us what we did well, and where we could use some improvement. We value your input.\"                                                                  Insurance Authorization Need to Know’s    Prior to your surgical procedure, our team will contact your Insurance Company to initiate a PreAuthorization request.      This is not a guarantee of payment from your insurance company, but rather a step taken to ensure that we have all of the information and documentation for  them to confirm the procedure is one that is eligible for coverage under your plan.    We will contact you if we either need more information from you to fulfill the requirements of your insurance company, or if we need to discuss any concerns that may lead to postponement or cancellation of your procedure. If you have any questions regarding your surgery authorization, please check with your insurance company or call our office for an update.    If you need information regarding your level of benefits or out-of-pocket expenses, please contact your insurance company directly.  They can also confirm for you whether or not we (the surgeon and the hospital/surgery center) are in your plan’s preferred network (aka ‘in-network’).    What to do if… My Insurance Changes:  If, at any time, your insurance company, plan or even card changes… Please call our office so that our team can be sure to update your records.  We will need to make sure to submit any PreAuth or kirill to the correct, up-to-date insurance plan.      What to do if… My Insurance Requires A Referral:  If your insurance company requires a Referral for Specialty Care or to see a Specialist, you will need to confirm with them if you have one on file.    - If your insurance carrier does not have a referral, then you will need to contact your Primary Care Physician to have one directly submitted to your insurance company ASAP.    - Without a referral on file, your insurance company will not Pre-Authorize your surgery and may not cover any of your care with our specialty.    What to do if… I have a Workers Compensation (W/C) Claim:  If you have a W/C claim, please be sure to provide our reception team with the information you have regarding your claim ASAP.  We will contact your W/C carrier/adjustor to inform them of your upcoming surgery and check the status of your claim (open vs closed).  We will let you know if they advise of any concerns or issues with  your claim.  - Even if you have an open W/C claim, please also provide us with your personal/family insurance.  We will want to be sure this plan is loaded into your account.  We always PreAuthorize with personal insurance as a back-up to W/C.  Otherwise, if W/C doesn’t cover something along the way, you will receive a bill for the services.    What to do if… I have Month-to-Month Coverage/Premiums:  If you have an insurance plan that is paid for month to month, or is subject to plan change on a monthly basis, please be aware we cannot initiate PreAuthorization until just before the month of your surgery, as your insurance company will need to verify your premium payments/eligibility first.    What to do if… I Do Not Have Insurance Coverage or Have other Insurance/Billing Questions:  Please call our Patient Contact Center:  [phone] to speak with a team member about your billing needs, including possible coverage options, setting up payment plans, our fee schedule, etc.   coffee

## 2023-03-30 NOTE — ED PROVIDER NOTE - SKIN, MLM
Transportation Needs: Unknown    Lack of Transportation (Non-Medical): No   Housing Stability: Unknown    Unstable Housing in the Last Year: No       No Known Allergies      Immunizations:  Immunization status: stated as current, but no records available.     Review of Systems - History obtained from the patient  General ROS: negative for - chills, fatigue, or fever  Psychological ROS: positive for - anxiety and sleep disturbances  negative for - depression or suicidal ideation  Ophthalmic ROS: negative for - blurry vision or double vision  ENT ROS: negative for - nasal congestion or sore throat  Respiratory ROS: negative for - shortness of breath or wheezing  Cardiovascular ROS: no chest pain or dyspnea on exertion  Gastrointestinal ROS: no abdominal pain, change in bowel habits, or black or bloody stools  Neurological ROS: negative for - confusion or dizziness  Dermatological ROS: negative for - rash    /74   Pulse 76   Temp 97.3 °F (36.3 °C) (Temporal)   Resp 16   Ht 6' 1\" (1.854 m)   Wt (!) 322 lb (146.1 kg)   SpO2 97%   BMI 42.48 kg/m²     Physical Examination: General appearance - alert, well appearing, and in no distress, oriented to person, place, and time, and overweight  Mental status - alert, oriented to person, place, and time, normal mood, behavior, speech, dress, motor activity, and thought processes, affect appropriate to mood  Eyes - pupils equal and reactive, extraocular eye movements intact  Neck - supple, no significant adenopathy  Lymphatics - no palpable lymphadenopathy, no hepatosplenomegaly  Chest - clear to auscultation, no wheezes, rales or rhonchi, symmetric air entry  Heart - normal rate, regular rhythm, normal S1, S2, no murmurs, rubs, clicks or gallops  Neurological - alert, oriented, normal speech, no focal findings or movement disorder noted  Extremities - peripheral pulses normal, no pedal edema, no clubbing or cyanosis  Skin - upper part of buttocks- pink/purple/red Skin normal color for race, warm, dry and intact. No evidence of rash.

## 2023-04-20 NOTE — BRIEF OPERATIVE NOTE - ANTIBIOTIC PROTOCOL
Followed protocol Nasal Turnover Hinge Flap Text: The defect edges were debeveled with a #15 scalpel blade.  Given the size, depth, location of the defect and the defect being full thickness a nasal turnover hinge flap was deemed most appropriate.  Using a sterile surgical marker, an appropriate hinge flap was drawn incorporating the defect. The area thus outlined was incised with a #15 scalpel blade. The flap was designed to recreate the nasal mucosal lining and the alar rim. The skin margins were undermined to an appropriate distance in all directions utilizing iris scissors  and carried over to close the primary defect.

## 2023-05-04 NOTE — ED ADULT NURSE NOTE - WEIGHT METHOD
tenderness. Musculoskeletal: Normal range of motion. He exhibits tr edema and no tenderness. Neurological: He is alert and oriented to person, place, and time. Skin: Skin is warm and dry. Psychiatric: He has a normal mood and affect. His behavior is normal. Thought content normal.       Assessment:      Diagnosis Orders   1. SOB (shortness of breath)        2. Essential hypertension        3. Nonrheumatic aortic valve insufficiency        4. Nonrheumatic mitral valve regurgitation                Plan:      Some REGAN. Wt down few lbs. Breathing reportedly better. Sx may be related to wt since improved breathing. Not real active. Wt is now down 3 lbs. BP good. No chest pain. Will continue atenolol/HCTZ/ASA/Lipitor. Labs ok 4/23. No changes. Continue to monitor. Reviewed previous records and testing including echo 4/23 and CXR 3/23. Echo only with mild MR/AI, nl LV. Follow up 3 months. stated

## 2023-05-31 NOTE — PROGRESS NOTE ADULT - SUBJECTIVE AND OBJECTIVE BOX
After verifying lead stability fluoroscopically and confirming hemostasis, the pocket was closed by Tyer CVT using 2 layers of 3.0 vicryl for the subcutaneous layer and a single layer of 4.0 vicry for the subcuticular layer. O/N: VSS, BRAYDEN  2/21: Awaiting MICHELE     POD: 1/30: Ex-lap, resection of EC fistula, SBRx2 with patino handsewn anastomosis, JPx2 (SQ) placement, primary abdominal closure with b/l external oblique release and prevena placement. O/N: VSS, BRAYDEN  2/21: Awaiting MICHELE     POD: 1/30: Ex-lap, resection of EC fistula, SBRx2 with patino handsewn anastomosis, JPx2 (SQ) placement, primary abdominal closure with b/l external oblique release and prevena placement.        Vital Signs Last 24 Hrs  T(C): 36.2 (22 Feb 2018 05:41), Max: 37.7 (21 Feb 2018 17:19)  T(F): 97.1 (22 Feb 2018 05:41), Max: 99.8 (21 Feb 2018 17:19)  HR: 79 (22 Feb 2018 05:41) (79 - 96)  BP: 164/98 (22 Feb 2018 05:41) (119/81 - 164/98)  BP(mean): --  RR: 17 (22 Feb 2018 05:41) (17 - 19)  SpO2: 96% (22 Feb 2018 05:41) (94% - 100%)      I&O's Summary    20 Feb 2018 07:01  -  21 Feb 2018 07:00  --------------------------------------------------------  IN: 360 mL / OUT: 0 mL / NET: 360 mL    21 Feb 2018 07:01  -  22 Feb 2018 06:18  --------------------------------------------------------  IN: 390 mL / OUT: 0 mL / NET: 390 mL        Gen: NAD  Abd: soft, nt / nd   incision c/d/i

## 2023-06-19 NOTE — PROGRESS NOTE ADULT - ASSESSMENT
57 F s/p  SBR, fistula resection, esophagojejunostomy revision w/ post-op course complicated by RTOR for distal anastomosis breakdown, ATN, acute respiratory failure, & septic shock.     NPO/TPN/IVF  Pain/nausea control  ISS  L IJ  Nystatin powder  SCDs, lovenox, OOBA  Drains: Malecot in enterotomy site, wound vac to midline  Wounds: Midline xiphoid to pubis incision; DTI & stage 2 pressure ulcer noted. 57 F s/p  SBR, fistula resection, esophagojejunostomy revision w/ post-op course complicated by RTOR for distal anastomosis breakdown, ATN, acute respiratory failure, & septic shock.     Wound vac covering to be replaced today  NPO/TPN/IVF  Pain/nausea control  ISS  L IJ  Nystatin powder  SCDs, lovenox, OOBA  Drains: Malecot in enterotomy site, wound vac to midline  Wounds: Midline xiphoid to pubis incision; DTI & stage 2 pressure ulcer noted. SSKI Counseling:  I discussed with the patient the risks of SSKI including but not limited to thyroid abnormalities, metallic taste, GI upset, fever, headache, acne, arthralgias, paraesthesias, lymphadenopathy, easy bleeding, arrhythmias, and allergic reaction.

## 2023-08-24 NOTE — H&P ADULT. - GASTROINTESTINAL DETAILS
Detail Level: Detailed X Size Of Lesion In Cm (Optional): 0 Incorporate Mauc In Note: No no guarding/obese/no distention/no rebound tenderness/nontender/no rigidity/soft

## 2023-09-12 NOTE — PHYSICAL THERAPY INITIAL EVALUATION ADULT - AMBULATION SKILLS, REHAB EVAL
independent no back pain/no chills/no congestion/no diaphoresis/no fever/no nausea/no syncope/no vomiting

## 2023-10-05 NOTE — ED ADULT NURSE NOTE - WOUND TYPE
"HPI:  Carline Iqbal is a 29 y.o. female here for Dizziness  She reports dizziness for 4 days. She reports dizzy upon standing and bending   Also c/o headaches for last few days; left sided headache, also c/o left ear pinna is swollen and tender to touch .    Review of Systems   Constitutional:  Negative for activity change and unexpected weight change.   HENT:  Positive for ear pain. Negative for congestion, ear discharge, hearing loss, rhinorrhea, sinus pressure and trouble swallowing.    Eyes:  Negative for discharge and visual disturbance.   Respiratory:  Negative for chest tightness and wheezing.    Cardiovascular:  Negative for chest pain and palpitations.   Gastrointestinal:  Negative for blood in stool, constipation, diarrhea and vomiting.   Endocrine: Negative for polydipsia and polyuria.   Genitourinary:  Negative for difficulty urinating, dysuria, hematuria and menstrual problem.   Musculoskeletal:  Positive for neck pain. Negative for arthralgias and joint swelling.   Neurological:  Positive for dizziness and headaches. Negative for weakness.   Psychiatric/Behavioral:  Negative for confusion and dysphoric mood.     A review of systems was performed and is negative except as noted above.    Objective:  Vitals:    10/05/23 1439   BP: 102/72   Pulse: (!) 112   Resp: 19   SpO2: 98%   Weight: 72.6 kg (160 lb 0.9 oz)   Height: 5' 4" (1.626 m)      Physical Exam  Vitals and nursing note reviewed.   Constitutional:       Appearance: She is well-developed.   HENT:      Head: Normocephalic and atraumatic.      Right Ear: External ear normal.      Left Ear: External ear normal.      Ears:        Comments: Area is swollen and tender, feels warm /inflamed   Eyes:      Conjunctiva/sclera: Conjunctivae normal.      Pupils: Pupils are equal, round, and reactive to light.   Neck:      Thyroid: No thyromegaly.      Vascular: No JVD.      Trachea: No tracheal deviation.   Cardiovascular:      Rate and Rhythm: Normal " rate and regular rhythm.      Heart sounds: Normal heart sounds.   Pulmonary:      Effort: Pulmonary effort is normal. No respiratory distress.      Breath sounds: Normal breath sounds. No wheezing or rales.   Chest:      Chest wall: No tenderness.   Abdominal:      General: Bowel sounds are normal. There is no distension.      Palpations: Abdomen is soft. There is no mass.      Tenderness: There is no abdominal tenderness. There is no guarding or rebound.   Musculoskeletal:         General: Normal range of motion.      Cervical back: Normal range of motion and neck supple.   Lymphadenopathy:      Cervical: No cervical adenopathy.   Skin:     General: Skin is warm and dry.   Neurological:      Mental Status: She is alert and oriented to person, place, and time.      Cranial Nerves: No cranial nerve deficit.      Motor: No abnormal muscle tone.      Coordination: Coordination normal.      Deep Tendon Reflexes: Reflexes are normal and symmetric. Reflexes normal.      Comments: CN: Optic discs are flat with normal vasculature, PERRL, Extraoccular movements and visual fields are full. Normal facial sensation and strength, Hearing symmetric, Tongue and Palate are midline and strong. Shoulder Shrug symmetric and strong.        Assessment/Plan:  1. Cellulitis of left external ear  -     cephALEXin (KEFLEX) 500 MG capsule; Take 1 capsule (500 mg total) by mouth 3 (three) times daily. for 10 days  Dispense: 30 capsule; Refill: 0    2. Vertigo  -     meclizine (ANTIVERT) 25 mg tablet; Take 1 tablet (25 mg total) by mouth 3 (three) times daily as needed for Dizziness.  Dispense: 20 tablet; Refill: 0    If not better call us.      ABSCESS

## 2023-10-25 NOTE — PHYSICAL THERAPY INITIAL EVALUATION ADULT - PATIENT/FAMILY/SIGNIFICANT OTHER GOALS STATEMENT, PT EVAL
To go home
To go home
PAST SURGICAL HISTORY:  S/P cataract surgery, right     S/P cholecystectomy 2003    S/P inguinal hernia repair bilateral repair 2004    S/P prostatectomy radical prostatectomy, 4/2000    Stented coronary artery LAD and LCx (2002)

## 2023-11-15 NOTE — ASU PREOP CHECKLIST - RESPIRATORY RATE (BREATHS/MIN)
Brief Post Operative Note      Patient: Enoch Bates 55 year old male     Location: [facility] OR    Surgery DT/TM: Procedure(s):  AMPUTATION, Below KNEE RIGHT     Pre-Op Diagnosis: Peripheral artery disease with critical limb ischemia, gangrenous right foot and ankle     Post-Op Diagnosis: Same    Procedure: Procedure(s):  AMPUTATION, Below KNEE RIGHT     Anesthesia Type: General with a regional block     Surgeon: Mich Weiss DO     Assistant: Micah Carney PA-C    Surgical tasks performed by assistant:  The physician assistant services were required for preoperative and postoperative assessment and documention, positioning and draping of the patient, intraoperative positioning and retraction, postoperative wound closure and applying postoperative  dressing/splint. Her assistance greatly helped facilitate the procedure decreasing operative time and the coexisting morbidities.    Findings: Same as preoperative diagnosis    Indications for Procedure: Gangrenous right foot causing sepsis.    Operative Procedure:  See operative report.    Specimens Removed:   Order Name Source Comment Collection Info Order Time   SURGICAL PATHOLOGY Leg, Right  Collected By: Mich Weiss DO 11/15/2023  9:24 AM     Is Add-on Testing being Requested on an Existing Surgical Specimen?   No          How should test results be released to the patient's MyChart portal?   Automatic Release             Estimated Blood Loss: 300 mL     Complications: NONE     Implants:   Implant Name Type Inv. Item Serial No.  Lot No. LRB No. Used Action   GRAFT AXIOFILL PARTICULATE 1000MG 100CM SQ ACELLULAR ECM - [id number] Tissue GRAFT AXIOFILL PARTICULATE 1000MG 100CM SQ ACELLULAR ECM -[id number] [employer] -[id number] Right 1 Implanted             Mich Weiss DO   
9

## 2023-11-15 NOTE — PROGRESS NOTE ADULT - CARDIOVASCULAR DETAILS
positive S1/positive S2
positive S2/positive S1
54 yr old male w/ PMHX HTN, AS and ascending thoracic aortic aneurysm who present to [facility] w/ ZAPATA , chest discomfort and dizziness. This began approx 3 days prior to admission and was associated w/ fatigue and lightheadedness. He says he has intermittent chest pain but denies an exertional component. He underwent a cath at [facility] today which revealed clean coronaries, he had a CTA of the chest which revealed a 4.6 X 4.5 cm ascending aneurysm. Echo on 11/3/23 revealed EF 60-65%, RV mildly dilated with preserved systolic function, RA mildly dilated, small PFO, severe AS ( mean gradient 48 mmHg, ORQUIDEA 0.88 cm2), dilated ascending aorta 42mm diameter, TX to [facility] for evaluation of AS by Dr. Aleman.    11/9: POD #0 AVR & ascending aortic reconstruction. post-op hypertension requiring nicardipine drip & stress hyperglycemia requiring insulin drip. Leukocytosis WBC 14. Thrombocytopenia [id number] (was 180 pre-op).  11/10: transfer to floor bed from CTU. VSS. NSR 80S. Ryan out. Voided 550mL. WBC 13. . Maintain mediastinal chest tube as per Dr. Aleman  11/11 Remove mediastinal chest tube . Decrease narcotic dosage.  11/12 left pigtail placement for moderate left ptx. No SOB, O2 sat 96%. Post pigtail placement cxr demonstrates reexpansion of left lung.  11/13 No events overnight. CXR this am stable with water seal and repeat cxr. Anticipate clamp trial prior to removal  11/14: VSS, AM CXR with no PTX. Patient ambulating, using IS pulling 1500cc, tolerating pO Diet + BM. Offers no acute complaints. Per Dr. Aleman patient ready for discharge to home.       General: Patient lying comfortably in bed, no acute distress     Neurological: Alert and oriented. No focal neurological deficits     Cardiovascular: S1S2, RRR, no murmurs appreciated on exam     Respiratory: Clear to ausculation bilaterally, no wheeze/rhonchi/rales    Gastrointestinal: + BS, soft, non tender, non distended     Extremities: Warm and well perfused. No edema, no calf tenderness     Vascular: 2+ Peripheral pulses b/l     Incision Sites: : MSI: clean, no signs of infection/dehiscence/click. CT sites: clean.

## 2023-11-22 NOTE — ED BEHAVIORAL HEALTH ASSESSMENT NOTE - AXIS IV
Continued Stay SW/CM Assessment/Plan of Care Note       Active Substitute Decision Maker (SDM)    There are no active Substitute Decision Maker (SDM) on file. Progress note:  Day 3- DxÂ Altered mental status, suspect metabolic encephalopathy/Sepsis- UTI/ Respiratory Failure- Asthma Exacerbation/ ARF/ CHF- Hypertensive urgency  Consults: Cardiology. IR/ nephrology   Pt is S/P IR R thoracentesis x2  right/ left sided- Tunneled PICC Placement    Placement of a non-tunneled right hemodialysis catheter 11/21  Â   Labs: Na 129/ creatinine 4.86/ phosphorus 6.9. WBC 15.1/ Hgb 10.9  Urine cx grew E Coli  pleural fluid is showing no growth x 1 days time - recent pleural fluid testing is pending Blood cx neg x 2  day  Respiratory Pathogen panel negative   VS- RRs are up to 40- O2 via Bipap at 25% fiO2. Recent /70  Per nephrology- Pt did not respond to a Lasix GTT- hemodialysis initiated   Cardiology note/ POC11/20: Discontinue isosorbide mononitrate and her carvedilol because of hypotension and bradycardia. ECHO completed- EF 58%  Pt is tx with IV antibiotics- PPI/ prednisone   She is at a + fluid balance of 1341. 4ML this AM  -  cardiac rehab consult was ordered for HF education this admission  Pt is alert/oriented      Pt resides at [place], [place] T [phone] Fax [phone]  At baseline- pt is alert/oreinted x 3- she ambulates with a walker independently. She can dress herself and has some assistance getting in/out of the shower. Pt has LE compression wraps that staff assists in applying- although she has not wearing the wraps as of recent as she has not been feeling well   Pt sleeps in a recliner chair  No O2 use PTA   Pt is participating in PT/OT at the [facility] through [facility] [employer] Â   Staff assists pt with medication administrators- the facility obtains medications through [address] [place] [address]  is via pt's son. Â   Â   PT. OT evals ordered- therapy evals are pending  Â   DC plan- CM called pt's son- Sanjayluzma Stefano [phone] yesterday and explained CM role with DC planning support     DC disposition TBD. Anticipated need for  [facility] or LTAC pending progress throughout this hospital stay/GOC review   Pt has a qualifying ICU/ stepdown LOS to access her Medicare benefit for LTAC placement-  Awaiting nephology POC re: LOT with hemodialysis   If HD is indicated post DC-  SNFs that accommodate onsite dialysis include: [place], [place]/ [address] and [address] OP dialysis sites include [facility] in [address] [phone]        * Palliative care consult ordered for [address] [place] review   [address] [facility] review 11/21:    Summary:Â   Goals of care are evolving with change in condition and pending clinical decision making. Pt seems quite motivated to recover and prolong her life if life-sustaining measures are needed short of CPR. Her relationship with her son is strong and he is very much involved and advocating for her recovery within the parameters of what she can tolerate and her wishes. If dialysis is recommended but pt should forego this intervention she may be a candidate for hospice. Time is needed for additional provider input and to monitor pt's decision-making/progress  Â Plan/Recommendations:   1)Â Continue best supportive care and DNR code status. 2)Â State DNR band if for transport on discharge; will coordinateÂ with CM. Pt under sedation at this hour and unable to sign. 3)Â PCC off service W86/76-91, returning 11/27.     Pt has a state DNR form on file   [facility] DNR band prepared- band to be applied per the bedside RN  [facility]   DME- Pt does not reside in a competitive bid area if she requires oxygen on discharge  Â   Readmit Risk 17%    LOS-Â TBD- awaiting consult recommendations/ progress through this hospital stay  DC is pending medical clearance/  consult clearance- therapy evals-O2 eval/ confirmed DC plan  CM will follow and assist pt with her DC planning needs  Updates shared with the team in [facility] today  [address] [facility], RNCM    * Update- Per OFTs- PT/OT will be DC'd- new orders will be needed when pt is able to participate in therapies   Â   Â   See SW/CM flowsheets for other objective data. Disposition Recommendations:  Preliminary discharge destination: Planned Discharge Destination: Location not determined at this time  SW/CM recommendation for discharge: Sub-acute nursing home, LTAC ([facility]/ LTAC pending progress/ GOC review - consideration noted today for dialysis)    Discharge Plan/Needs:     Continued Care and Services - Admitted Since 11/19/2023    Coordination has not been started for this encounter. Devices/ Equipment that need to be arranged for discharge: Other (comment) (TBD)   Anticipated placement need post DC     Prior To Hospitalization:    Living Situation: Supervised living, Other facility residents and residing at Assisted living    .   Support Systems: Family members, Children   Home Devices/Equipment: Sensory assist device, Mobility assist device, Blood pressure monitor            Mobility Assist Devices: Four wheel walker   Type of Service Prior to Hospitalization:  ([facility] staff support)               Patient/Family discharge goal (s):   (Pt/family DC plan to be verified as pt nears medical readiness for release)     Resources provided:           Therapy Recommendations for Discharge:   PT:      Last Filed Values     None        OT:       Last Filed Values     None        SLP:    Last Filed Values     None          Mobility Equipment Recommended for Discharge:        Barriers to Discharge  Identified Barriers to Discharge/Transition Planning: Medical necessity- pending labs- creat level/ O2 eval/ Consult clearance- pending therapy evals- nephrology POC- determination if pt will require ongoing HD tx post discharge- pending ongoing [address] [place] [address] review and confirmed DC disposition Problems with access to healthcare services

## 2023-12-05 NOTE — PATIENT PROFILE ADULT - NSPROMUTANXFEARADDRESSFT_GEN_A_NUR
-RELAX AT HOME FOR THE REST OF THE DAY. YOU MAY EAT ANYTHING YOU LIKE.   -PLAN TO SEE DR JAMES TOMORROW AT THE OFFICE. BRING ALL EYE DROPS WITH YOU TO THIS APPOINTMENT.    -PUT EYE DROPS IN THE AFFECTED EYE AS PER DR JAMES'S EYE DROP SCHEDULED. -USE IN ANY ORDER, WAIT 5 MINUTES BETWEEN DROPS.  -REMOVE EYE SHIELD WHILE PUTTING EYE DROPS IN.    -DO NOT RUB YOUR EYE!!  -DO NOT EXERT YOURSELF - NO HEAVY LIFTING, RUNNING OR SWIMMING FOR 1 WEEK.  -YOU MAY SHOWER, BUT DON'T ALLOW WATER INTO YOUR EYE FOR 1 WEEK.  -WEAR PROTECTIVE SUNGLASSES DURING THE DAY AND AN EYE SHIELD AT NIGHT FOR 1 WEEK.    NO DRINKING ALCOHOL OR DRIVING FOR 24 HOURS.    -IF YOU HAVE PAIN, REDNESS OR DECREASED VISION, CALL DR JAMES'S OFFICE -[phone] OR IF AFTER HOURS CALL [phone].   n/a

## 2023-12-27 NOTE — PHYSICAL THERAPY INITIAL EVALUATION ADULT - FOLLOWS COMMANDS/ANSWERS QUESTIONS, REHAB EVAL
Subjective     History of Present Illness:   Brandon Snider is a 50 y.o. male with hx of CLL and obesity who presents for follow up from recent hospital admission in 11/2023 for idiopathic pancreatitis attributed to possible hydrochlorothiazide that was discontinued on discharge.     Patient states he was experiencing prodromal  symptoms as if he was coming down with a cold along with diarrhea, abdominal pain two weeks prior to presentation to the ED. He states he though he was experiencing issues with an abdominal hernia. He was diagnosed with acute pancreatitis based on elevated Lipase to 200s. Patient states he was experiencing pain in his lower abdomen that was cramping in nature. He had no radiation. Diarrhea resolved during admission. He had no nausea or emesis. Has not had symptoms similar to this in the past.     Review of CT scan suggests gastric and duodenal thickening as well as ?mild ascites. + hepatic steatosis. Repeat attempt at paracentesis was not successful due to insuffiencey fluid.    Review of labs showing elevated ALT to 74.     Patient states he does not smoke. He drinks 1-2 beers a month at most. No family hx of pancreatic malignancy or pancreatic disease.     Today, he states he feels at his baseline. No abdominal pain. He has 1-2 soft bowel movements daily. He has not had a prior colonoscopy. Completed cologuard two years ago that was negative per patient.         Past Medical History   has a past medical history of Encounter for general adult medical examination without abnormal findings (11/05/2019), Obesity, unspecified (11/05/2019), Other specified abnormal findings of blood chemistry (04/29/2021), Other specified counseling (12/14/2020), Personal history of diseases of the blood and blood-forming organs and certain disorders involving the immune mechanism (04/29/2021), and Sinobronchitis (01/27/2023).     Social History   reports that he has never smoked. He has never used smokeless  tobacco. He reports current alcohol use. He reports that he does not use drugs.     Family History  family history includes Esophageal cancer in his mother; Hypertension in his mother; Prostate cancer in his father, paternal grandfather, and another family member; cardiac disorder in his father; elevated liver enzymes in his father.     Allergies  Allergies   Allergen Reactions    Hydrochlorothiazide Other     May have caused pancreatitis 11/2023.       Medications  Current Outpatient Medications   Medication Instructions    amLODIPine (NORVASC) 10 mg, oral, Daily    choline fenofibrate (TRILIPIX) 135 mg, oral, Daily    escitalopram (LEXAPRO) 10 mg, oral, Daily    lisinopril 60 mg, oral, Daily    omeprazole OTC (PRILOSEC OTC) 20 mg, oral, 2 times daily, Do not crush, chew, or split.        Objective   Visit Vitals  /70   Pulse 71      Physical Exam  Vitals reviewed.   Constitutional:       Appearance: Normal appearance.   HENT:      Head: Normocephalic.      Mouth/Throat:      Mouth: Mucous membranes are moist.   Cardiovascular:      Rate and Rhythm: Normal rate and regular rhythm.   Pulmonary:      Effort: Pulmonary effort is normal.      Breath sounds: Normal breath sounds.   Abdominal:      General: Abdomen is flat.      Palpations: Abdomen is soft.      Tenderness: There is no abdominal tenderness. There is no guarding or rebound.      Hernia: No hernia is present.   Neurological:      General: No focal deficit present.      Mental Status: He is alert.   Psychiatric:         Mood and Affect: Mood normal.         Judgment: Judgment normal.         Assessment/Plan   Brandon Snider is a 50 y.o. male with hx of CLL and obesity who presents for follow up from recent hospital admission in 11/2023 for idiopathic pancreatitis attributed to possible hydrochlorothiazide that was discontinued on discharge.     Unclear if his symptoms were due to acute pancreatis vs ?gastroenteritis. Although lipase was elevated to  >3XULN, he did not have symptoms c/w pancreatic pain. CT read does not demonstrate any finding of pancreatitis (describing gastritis) though images not available for review at this time. His prodromal symptoms preceding admission suggest possible viral illness that would explain gastric thickening. Regardless, will plan on EGD for further evaluation to rule out underlying PUD, gastritis, malignancy (less likely). Will start on PPI daily for empiric treatment of PUD in the interim. Will test for Hpylori. Plan to obtain MRI with MRCP images to evaluate biliary tree and for any structural pathology that could have predisposed patient to pancreatitis. Reassuringly, he has clinically improved and is currently doing well.    Of note, review of labs show mild elevation in ALT to 74 with findings of steatosis on imaging. Will obtain fibroscan study for further evaluation. Discussed with patient recommendation for weight loss of at least 10% along with moderate exercise for 30 minutes at least 3x weekly to minimize risk of progression to advanced fibrosis/cirrhosis.         Problem List Items Addressed This Visit          Gastrointestinal and Abdominal    Idiopathic acute pancreatitis without infection or necrosis    Relevant Orders    MR abdomen w and wo IV contrast    Other ascites     Other Visit Diagnoses       Lower abdominal pain    -  Primary    Relevant Medications    omeprazole OTC (PriLOSEC OTC) 20 mg EC tablet    Other Relevant Orders    EGD    H. Pylori Breath Test    Hepatic steatosis        Relevant Orders    Liver Elastography (Fibroscan) TRINO Austin MD         My final recommendations will be communicated back to the requesting physician by way of shared Medical record or letter to requesting physician via fax.        100% of the time

## 2024-02-03 NOTE — ED PROVIDER NOTE - ENMT, MLM
DISPLAY PLAN FREE TEXT
Airway patent, Nasal mucosa clear. Mouth with normal mucosa. Throat has no vesicles, no oropharyngeal exudates and uvula is midline.

## 2024-04-17 NOTE — PROGRESS NOTE ADULT - SUBJECTIVE AND OBJECTIVE BOX
andrzej was extubated yesterday  ct scan was reviewed and discussed with dr sotomayor and gi  overall is acceptable and has some fluid or material to left of pouch with both drains in place  there is some fluid in pelvis but her wbc is 9k and numbers are gooid  our plan is to place pigtail and then remove casey drains in several days  we are reluctant to do as will require sedation and just extubated  think can wait until end of week or longer  on j tube feeds [Person] : oriented to person [Place] : oriented to place [Time] : oriented to time [Concentration Intact] : normal concentrating ability [Visual Intact] : visual attention was ~T not ~L decreased [Naming Objects] : no difficulty naming common objects [Repeating Phrases] : no difficulty repeating a phrase [Writing A Sentence] : no difficulty writing a sentence [Fluency] : fluency intact [Comprehension] : comprehension intact [Reading] : reading intact [Past History] : adequate knowledge of personal past history [Cranial Nerves Optic (II)] : visual acuity intact bilaterally,  visual fields full to confrontation, pupils equal round and reactive to light [Cranial Nerves Oculomotor (III)] : extraocular motion intact [Cranial Nerves Facial (VII)] : face symmetrical [Cranial Nerves Trigeminal (V)] : facial sensation intact symmetrically [Cranial Nerves Vestibulocochlear (VIII)] : hearing was intact bilaterally [Motor Tone] : muscle tone was normal in all four extremities [Motor Strength] : muscle strength was normal in all four extremities [No Muscle Atrophy] : normal bulk in all four extremities [Motor Handedness Right-Handed] : the patient is right hand dominant [Sensation Tactile Decrease] : light touch was intact [Abnormal Walk] : normal gait [Balance] : balance was intact [Past-pointing] : there was no past-pointing [Tremor] : no tremor present [2+] : Ankle jerk left 2+ [Plantar Reflex Right Only] : normal on the right [Plantar Reflex Left Only] : normal on the left

## 2024-06-19 NOTE — PROGRESS NOTE ADULT - ASSESSMENT
Patient will be discharged home and follow up with Dr. Reynlods in 2 weeks, for which the patient already has scheduled.    You may call or text Dr. Reynolds' medical assistant during business hours at [phone].  You may call the emergency RODNEY line during nights/weekends/holidays if needed at [phone].    Instructions:  -Leave the bandage in place until your followup appointment in 2 weeks.  -May shower with bandage in place, if bandage becomes soaked underneath please remove and call the office immediately.  -No baths/tubs/pools/submersion of the wound for now.  -Call if there is significant drainage beneath the bandage    -Put as much weight as comfortable on the operative leg.  Use a walker to assist with balance to avoid any falls.  -It is important to start moving and stretching right away, otherwise the joint can stiffen.    -Take your blood clot prevention medication for 4 weeks after surgery (81 mg of Aspirin, twice daily).  -Use ice frequently to help with pain and swelling control  -Pain management:  Standard pain regimen should be:  Ice as much as possible.  Apply the ice anywhere you feel pain.  Meloxicam (anti-inflammatory)- 7.5mg one tablet every day.  Take this automatically on a scheduled basis.  Do not take any other NSAIDs while taking this.  Tylenol (acetaminophen) - 1000mg every 8 hours. Take this automatically on a scheduled basis.  Tramadol - 1-2 capsules every 6 hours. Take this automatically on a scheduled basis until no longer needed for pain.  Oxycodone - 1-2 tablets every 6 hours only if needed.  You would take the oxycodone 3 hours after the Tramadol, such that you are taking one or the other every 3 hours        -Try to limit the amount of oxycodone since it tends to cause constipation, drowsiness, etc.  But if you need it, take it.        -100mg of Colace (docusate) will be prescribed. Take this twice a day while still taking Tramadol or Oxycodone. Can discontinue after opiates  are no longer being taken.  If bowel movement has not occurred in 48 hours please add in a laxative called Senna (over the counter) twice daily in conjunction with the colace. If no bowel movement is produced 48 hours after adding in Senna please start Milk of Magnesia (over the counter).     AGGRESSIVE RANGE OF MOTION.  Do a quad set a million times per day.  Have a family member push down on the thigh and shin every 20 minutes with 2-3 pillows under the heel to help stretch the knee straight.  Flex the knee as far as possible every 20 minutes.          57 F s/p  SBR, fistula resection, esophagojejunostomy revision w/ post-op course complicated by RTOR for distal anastomosis breakdown, ATN, acute respiratory failure, & septic shock.   NPO/TPN/IVF  Pain/nausea control  ISS  Zosyn  Nystatin powder  SCDs, SQH, OOBA  Drains: Malecot in enterotomy site, wound vac to midline  Wounds: Midline xiphoid to pubis incision; DTI & stage 2 pressure ulcer noted. 57 F s/p  SBR, fistula resection, esophagojejunostomy revision w/ post-op course complicated by RTOR for distal anastomosis breakdown, ATN, acute respiratory failure, & septic shock.   NPO/TPN/IVF  IR for PICC today  Wound care for Vac.   Pain/nausea control  ISS  Zosyn  Nystatin powder  SCDs, SQH, OOBA  Drains: Malecot in enterotomy site, wound vac to midline  Wounds: Midline xiphoid to pubis incision; DTI & stage 2 pressure ulcer noted.

## 2024-10-15 NOTE — PHYSICAL THERAPY INITIAL EVALUATION ADULT - REHAB POTENTIAL, PT EVAL
Spoke with Pharmacist, advised quantity is 6 tabs not 5.  Kat Maria RN     fair, will monitor progress closely

## 2024-11-22 NOTE — PROGRESS NOTE ADULT - ASSESSMENT
Jero Ceja called to request a refill on his medication.      Last office visit : 11/4/2024   Next office visit : Visit date not found     Requested Prescriptions     Pending Prescriptions Disp Refills    vitamin D (CHOLECALCIFEROL) 25 MCG (1000 UT) TABS tablet 90 tablet 1     Sig: Take 1 tablet by mouth daily            Hanna Neff MA     Neuro:  fentanyl PRN, tylenol prn  CVS: goal MAP>70, normotensive goals.  Albumin 25% q8hrs. levophed gtt   Pulm: Intubated AC, peridex, duonebs  FEN/GI: NPO, J-tube tube feed to goal. Protonix  : stone, post op ATN, lasix prn  Endo: ISS. hydrocortisone taper.   ID: renally dosed Zosyn (12/3--), fluconazole (12/4--),   Heme: SCDs, SQH  Lines/drains: LIJ (12/3-), L IJ Juanjo (12/6-), [place] (12/3-), 2 abd JPs, feeding jtube  Wounds: midline abd wound w/wound vac

## 2024-11-26 NOTE — PROGRESS NOTE BEHAVIORAL HEALTH - NS ED BHA MED ROS SKIN
No complaints
Bed/Stretcher in lowest position, wheels locked, appropriate side rails in place/Call bell, personal items and telephone in reach/Instruct patient to call for assistance before getting out of bed/chair/stretcher/Non-slip footwear applied when patient is off stretcher/[place] to call system/Physically safe environment - no spills, clutter or unnecessary equipment/Purposeful proactive rounding/Room/bathroom lighting operational, light cord in reach
No complaints
No complaints

## 2024-11-26 NOTE — PATIENT PROFILE ADULT. - IS PATIENT PREGNANT?
We will send you lab results    Stay on same meds    But could taper down / off citalopram. Go to 1/2 pill daily for 1-2 weeks, then 1/2 pill every other day, then off    Keep working on healthy diet/exercise               Patient Education   Preventive Care Advice   This is general advice given by our system to help you stay healthy. However, your care team may have specific advice just for you. Please talk to your care team about your preventive care needs.  Nutrition  Eat 5 or more servings of fruits and vegetables each day.  Try wheat bread, brown rice and whole grain pasta (instead of white bread, rice, and pasta).  Get enough calcium and vitamin D. Check the label on foods and aim for 100% of the RDA (recommended daily allowance).  Lifestyle  Exercise at least 150 minutes each week  (30 minutes a day, 5 days a week).  Do muscle strengthening activities 2 days a week. These help control your weight and prevent disease.  No smoking.  Wear sunscreen to prevent skin cancer.  Have a dental exam and cleaning every 6 months.  Yearly exams  See your health care team every year to talk about:  Any changes in your health.  Any medicines your care team has prescribed.  Preventive care, family planning, and ways to prevent chronic diseases.  Shots (vaccines)   HPV shots (up to age 26), if you've never had them before.  Hepatitis B shots (up to age 59), if you've never had them before.  COVID-19 shot: Get this shot when it's due.  Flu shot: Get a flu shot every year.  Tetanus shot: Get a tetanus shot every 10 years.  Pneumococcal, hepatitis A, and RSV shots: Ask your care team if you need these based on your risk.  Shingles shot (for age 50 and up)  General health tests  Diabetes screening:  Starting at age 35, Get screened for diabetes at least every 3 years.  If you are younger than age 35, ask your care team if you should be screened for diabetes.  Cholesterol test: At age 39, start having a cholesterol test every 5  years, or more often if advised.  Bone density scan (DEXA): At age 50, ask your care team if you should have this scan for osteoporosis (brittle bones).  Hepatitis C: Get tested at least once in your life.  STIs (sexually transmitted infections)  Before age 24: Ask your care team if you should be screened for STIs.  After age 24: Get screened for STIs if you're at risk. You are at risk for STIs (including HIV) if:  You are sexually active with more than one person.  You don't use condoms every time.  You or a partner was diagnosed with a sexually transmitted infection.  If you are at risk for HIV, ask about PrEP medicine to prevent HIV.  Get tested for HIV at least once in your life, whether you are at risk for HIV or not.  Cancer screening tests  Cervical cancer screening: If you have a cervix, begin getting regular cervical cancer screening tests starting at age 21.  Breast cancer scan (mammogram): If you've ever had breasts, begin having regular mammograms starting at age 40. This is a scan to check for breast cancer.  Colon cancer screening: It is important to start screening for colon cancer at age 45.  Have a colonoscopy test every 10 years (or more often if you're at risk) Or, ask your provider about stool tests like a FIT test every year or Cologuard test every 3 years.  To learn more about your testing options, visit:   .  For help making a decision, visit:   https://bit.ly/wp55507.  Prostate cancer screening test: If you have a prostate, ask your care team if a prostate cancer screening test (PSA) at age 55 is right for you.  Lung cancer screening: If you are a current or former smoker ages 50 to 80, ask your care team if ongoing lung cancer screenings are right for you.  For informational purposes only. Not to replace the advice of your health care provider. Copyright   2023 [employer]. All rights reserved. Clinically reviewed by the [facility]. miradio.fm 823075 -  REV 01/24.  Learning About Sleeping Well  What does sleeping well mean?     Sleeping well means getting enough sleep to feel good and stay healthy. How much sleep is enough varies among people.  The number of hours you sleep and how you feel when you wake up are both important. If you do not feel refreshed, you probably need more sleep. Another sign of not getting enough sleep is feeling tired during the day.  Experts recommend that adults get at least 7 or more hours of sleep per day. Children and older adults need more sleep.  Why is getting enough sleep important?  Getting enough quality sleep is a basic part of good health. When your sleep suffers, your physical health, mood, and your thoughts can suffer too. You may find yourself feeling more grumpy or stressed. Not getting enough sleep also can lead to serious problems, including injury, accidents, anxiety, and depression.  What might cause poor sleeping?  Many things can cause sleep problems, including:  Changes to your sleep schedule.  Stress. Stress can be caused by fear about a single event, such as giving a speech. Or you may have ongoing stress, such as worry about work or school.  Depression, anxiety, and other mental or emotional conditions.  Changes in your sleep habits or surroundings. This includes changes that happen where you sleep, such as noise, light, or sleeping in a different bed. It also includes changes in your sleep pattern, such as having jet lag or working a late shift.  Health problems, such as pain, breathing problems, and restless legs syndrome.  Lack of regular exercise.  Using alcohol, nicotine, or caffeine before bed.  How can you help yourself?  Here are some tips that may help you sleep more soundly and wake up feeling more refreshed.  Your sleeping area   Use your bedroom only for sleeping and sex. A bit of light reading may help you fall asleep. But if it doesn't, do your reading elsewhere in the house. Try not to use your TV,  "computer, smartphone, or tablet while you are in bed.  Be sure your bed is big enough to stretch out comfortably, especially if you have a sleep partner.  Keep your bedroom quiet, dark, and cool. Use curtains, blinds, or a sleep mask to block out light. To block out noise, use earplugs, soothing music, or a \"white noise\" machine.  Your evening and bedtime routine   Create a relaxing bedtime routine. You might want to take a warm shower or bath, or listen to soothing music.  Go to bed at the same time every night. And get up at the same time every morning, even if you feel tired.  What to avoid   Limit caffeine (coffee, tea, caffeinated sodas) during the day, and don't have any for at least 6 hours before bedtime.  Avoid drinking alcohol before bedtime. Alcohol can cause you to wake up more often during the night.  Try not to smoke or use tobacco, especially in the evening. Nicotine can keep you awake.  Limit naps during the day, especially close to bedtime.  Avoid lying in bed awake for too long. If you can't fall asleep or if you wake up in the middle of the night and can't get back to sleep within about 20 minutes, get out of bed and go to another room until you feel sleepy.  Avoid taking medicine right before bed that may keep you awake or make you feel hyper or energized. Your doctor can tell you if your medicine may do this and if you can take it earlier in the day.  If you can't sleep   Imagine yourself in a peaceful, pleasant scene. Focus on the details and feelings of being in a place that is relaxing.  Get up and do a quiet or boring activity until you feel sleepy.  Avoid drinking any liquids before going to bed to help prevent waking up often to use the bathroom.  Where can you learn more?  Go to https://www.[employer].net/patiented  Enter J942 in the search box to learn more about \"Learning About Sleeping Well.\"  Current as of: July 10, 2023  Content Version: 14.2 2024 [facility] [employer]   Care " instructions adapted under license by your healthcare professional. If you have questions about a medical condition or this instruction, always ask your healthcare professional. [employer] disclaims any warranty or liability for your use of this information.        no

## 2025-02-03 NOTE — REASON FOR VISIT
Dear Vanessa Arreola    Thank you for choosing [facility] ([facility] for your infusion.  The following information is a summary of our appointment as well as important reminders.      EDUCATION POST BIOLOGICAL/CHEMOTHERAPY INFUSION  Call the triage nurse at your clinic or seek medical attention if you have chills and/or temperature greater than or equal to 100.5, uncontrolled nausea/vomiting, diarrhea, constipation, dizziness, shortness of breath, chest pain, heart palpitations, weakness or any other new or concerning symptoms, questions or concerns.  You can not have any live virus vaccines prior to or during treatment or up to 6 months post infusion.  If you have an upcoming surgery, medical procedure or dental procedure during treatment, this should be discussed with your ordering physician and your surgeon/dentist.  If you are having any concerning symptom, if you are unsure if you should get your next infusion or wish to speak to a provider before your next infusion, please call your care coordinator or triage nurse at your clinic to notify them so we can adequately serve you.     If you have any questions on your upcoming Specialty Infusion appointments, please call scheduling at [phone].  It was a pleasure taking care of you today.    Sincerely,    [facility]  [address] [place]  [address]  Phone:  [phone]    [Follow-Up Visit] : a follow-up visit for

## 2025-04-03 NOTE — ED BEHAVIORAL HEALTH ASSESSMENT NOTE - NS ED BHA MED ROS SKIN
[facility]   TAVR DISCHARGE SUMMARY    Admit Date 4/1/2025   Discharge Date 4/2/2025   Admit MD Luis Armando Spence MD   Admit Diagnosis Aortic valve stenosis, etiology of cardiac valve disease unspecified [I35.0]  Symptomatic severe aortic stenosis with normal ejection fraction [I35.0]   Discharge Diagnosis Patient Active Problem List   Diagnosis    Hyperlipidemia    Old myocardial infarction    Essential hypertension    Coronary artery disease involving native coronary artery of native heart without angina pectoris    Palpitations    Shortness of breath    History of coronary angioplasty    Syncope    Lightheaded    Sick sinus syndrome (HCC)    Paroxysmal atrial fibrillation (HCC)    Dizziness and giddiness    Bradycardia    Cardiac pacemaker in situ    Acute diastolic (congestive) heart failure    Community acquired pneumonia    Pneumonia    Chronic diastolic (congestive) heart failure    Nonrheumatic aortic valve stenosis    PAF (paroxysmal atrial fibrillation) (HCC)    A-fib (HCC)    Symptomatic severe aortic stenosis with normal ejection fraction    Obstructive sleep apnea on CPAP    Type 2 diabetes mellitus without complication, without long-term current use of insulin      Discharge Condition good     Admit CHF Type Admit NYHA Admit FHM Admit FHm IP CHF Tx   [x]Chronic Diast(D)  []Chronic Syst(S)  []Chronic S+D  []Acute Diast  []Acute Syst  []Acute/Chronic Diast  []Acute/Chronic Syst  []Acute/Chronic Syst+Diast []I  []II  [x]III  []IV [x]PND  []Neck vein dist  []Rales  []Cardiomeg  []Pulm Edema  []S3  []HJR []Edema  [x]SAWANT  []Hepatomeg  []Night cough  []Pleural eff  []Tachy >120 [x]TAVR  [x]Fluid restriction  [x]Salt restriction  []IV Diuretic    [x]Oral Diuretic    []Prolonged Admit    [x]CHF Consult     Procedural alf   []Acute Syst CHF  []A/C Syst CHF  []Acute Diast CHF  []A/C Diast CHF  []Acute S/D CHF  []Cardiogenic shock []Acute Resp Failure  []Resp Failure  []A/C Resp Failure  []ESRD  []Protein 
No complaints

## 2025-04-14 NOTE — PROGRESS NOTE BEHAVIORAL HEALTH - NS ED BHA MSE GENERAL APPEARANCE
Written and verbal instructions given to patient and family member at bedside. Patient awake, alert, pleasant no complaints     Well developed

## 2025-05-12 NOTE — PATIENT PROFILE ADULT. - ABILITY TO HEAR (WITH HEARING AID OR HEARING APPLIANCE IF NORMALLY USED):
Adequate: hears normal conversation without difficulty [facility]   part of [facility]  Palliative Care Initial Consult    Jamee Johnson Patient Status:  Observation    1936 MRN [id number]   Location [facility] 7NE-A Attending Manpreet Ingram DO   Hosp Day # 0 PCP Angle Mccurdy MD   [id number]     Date of Consult: 25   Today is day #0 of hospital stay.     Palliative care consultation was requested by Dr. Manpreet Ingram for evaluation of palliative care needs and support with Goals of care discussion.    Subjective       HPI:  Jamee Johnson is a 88 year old female with PMH significant for AF, CAD s/p PCI, BiV ICD, CKD-3 hospitalized 2024 for CVA, presented to ED on 2025 with CC of word-finding difficulties, L facial droop and L-sided weakness, nausea and poor po intake.  CT head neg with MRI brain pending as of  d/t presence of PPM and EP eval pending. Initial workup also revealed UTI, encephalopathy, elevated LFTs.     Therapy recommendations are HH therapy.   SLP recommends regular solids and thin liquids.      HPI obtained from [facility] and [facility].    This is the 1st hospitalization in the past 6 months.    Medical History: obtained from [facility]  Past Medical History[1]  Past Surgical History[2]    Allergies:  Allergies[3]    Medications:   Current Hospital Medications[4]  Prior to Admission Medications[5]    Functional Status History:  ADLs: bathing or showering, dressing, getting in and out of bed or a chair, walking, using the toilet, and eating  - LOW  1 - 3 performance deficits   IADLs: use the phone, shop for groceries, meal preparation, manage medicines, clean living area, use transportation by self, manage money  - LOW  1 - 3 performance deficits   DME: Walker    Palliative Care Social History:   Marital Status:   Children: Yes  Living Situation Prior to Admit: Assisted Living at [facility]  Does Patient Live Alone: Yes  Is Patient Confused: No  Occupational History:  deferred      Set as collapsible by  default.[6]    Substance History:   reports that she has never smoked. She has never used smokeless tobacco.  reports no history of alcohol use.  reports no history of drug use.      Spiritual Assessment:   [place] Not [place]ed      REVIEW OF SYSTEMS:     I visited Jamee without visitors at bedside. She was asleep initially, awakened easily and was ok with having discussion about her care.    OBJECTIVE       Medications:  Current Hospital Medications[7]    Hematology:   Lab Results   Component Value Date    WBC 9.1 05/10/2025    HGB 15.3 05/10/2025    HCT 47.2 05/10/2025    .0 05/10/2025       Chemistry:  Lab Results   Component Value Date    CREATSERUM 0.99 05/12/2025    BUN 12 05/12/2025     05/12/2025    K 3.7 05/12/2025     05/12/2025    CO2 22.0 05/12/2025     (H) 05/12/2025    CA 9.1 05/12/2025    ALB 4.0 05/11/2025    ALKPHO 89 05/11/2025    BILT 0.5 05/11/2025    TP 6.4 05/11/2025    AST 69 (H) 05/11/2025     (H) 05/11/2025    MG 2.2 05/10/2025    TROP 0.118 (HH) 12/26/2019       Imaging:  No results found.    Vital Signs:  Blood pressure 132/64, pulse 70, temperature 98 °F (36.7 °C), temperature source Oral, resp. rate 17, weight 167 lb 12.3 oz (76.1 kg), SpO2 97%.      Supplemental O2:       Physical Exam:     GENERAL: Alert. NAD.  BODY HABITUS:  Body mass index is 31.7 kg/m². HEENT: No focal deficits.  RESPIRATORY: no increased effort at rest on room air.  NEUROLOGIC: oriented x3,  - facial symmetry noted w smiling and speaking  PSYCHIATRIC:  appropriate.    Pre-hospital Palliative Performance Scale: (pt/family reported/per chart review): 60%  Current Palliative Performance Scale:  55%    Palliative Performance Scale   % Ambulation Activity Level Self-Care Intake Consciousness   100 Full Normal Full   Normal Full   90 Full No disease  Normal Full Normal Full   80 Full Some disease  Normal w/effort Full Normal or  Reduced Full   70 Reduced Some disease  Can't perform  job Full Normal or   Reduced Full   60 Reduced Significant disease  Can't perform hobby Occasional  Assist Normal or   Reduced Full or confused   50 Mainly sit/lie Extensive Disease  Can't do any work Partial Assist Normal or Reduced Full or confused   40 Mainly in bed Extensive Disease  Can't do any work Mainly Assist Normal or Reduced Full or confused   30 Bedbound Extensive Disease  Can't do any work Max Assist  Total Care Reduced Drowsy/confused   20 Bedbound Extensive Disease  Can't do any work Max Assist  Total Care Minimal Drowsy/confused   10 Bedbound/coma Extensive Disease  Can't do any work  coma  Max Assist  Total Care Mouth care Drowsy or coma   0 Death     Palliative Care Assessment       Goals of Care:      Provided introduction to Palliative Care and reason for consultation to Jamee. Discussion included the benefits of palliative care to provide extra layer of support to patient/family who wish to continue curative or restorative medical therapies. Palliative care was differentiated from hospice philosophy and model of care by reviewing the treatment-focus with palliative care, versus comfort-focused care with hospice.   We also discussed that having palliative care support does not limit medical treatment options or decisions.       Palliative Care Services:  1) Usually visit once per 2-3 weeks  2) Perform GOC discussions  3) Follow up on symptom management    Palliative Care criteria:  1) Is not effected by prognostication (can receive palliative care services if prognosis is in hours, days, months, years, decades)  2) May continue life-prolonging measures and treatments  3) Includes treatment of symptoms to improve quality of life while receiving above measures and treatments  4) Consultation services Hospice services:  1) Phone services 24/7 (they will be your 911 at all hours when symptoms flare)  2) Increase visits according to symptoms (more robust than palliative care)    Hospice criteria:  1)  Requires less than 6 months prognosis of life by two physicians  2) Must forego life-prolonging measures and treatments  3) Focus on complete and total comfort care  4) Must agree to sign on hospice benefit to receive services       Diagnostic understanding and Prognostic Awareness:  Jamee is able to state that she is here for confusion and changes in her ability to think. She is aware she underwent workup for possible stroke which has been negative up to this point (CT neg). She is aware of pending MRI today for further evaluation to confirm. She was unable to tell me about presence of UTI and that she is undergoing treatment for this.  Education provided about sometimes infections like UTI can manifest by change in mentation and function, which may seems to be the case for her given improvement in her mentation, function, and neuro symptoms improving as well. She expressed understanding and agreement. She is in agreement with MRI to confirm her hopes for no acute neurological insults.       Hopes / Goals:  For continued negative neuro/stroke workup, and ability to return home at DC.  She is aware therapy recommends ongoing support at home.    Concerns / Fears:  Denied at this time.        Advance Care Planning:    HCPOA:  Document on file.  Healthcare Agent Appointed: Yes  Healthcare Agent's Name: Marjorie Weinstein - daughter  Healthcare Agent's Phone Number: [phone]    Code Status:  Full Code  A voluntary discussion surrounding resuscitation and LST took place with Jamee in reference to POLST created in 2023. She confirms that CPR and ventilator support remain in line w her wishes at this time.    Emotional support provided to Jamee. She does not have further questions, concerns, or needs at this time.    Problem List:       Principal Problem:    Aphasia  Active Problems:    Encephalopathy    Counseling regarding advance care planning and goals of care    Palliative care encounter      Palliative Care  Recommendations / Plan:       Goals of Care: Continue full medical management.  Jamee confirms ongoing medical management is in line w her current goals of care, and states no further needs at this time.    Code Status: Full Code.  Confirmed. POLST on file reflecting Full code, Full treatment, No feeding tubes on file from 11/2023.    HCPOA: Lo - daughter    Disposition:  pending clinical course, anticipate back to [facility].      A total of 40 minutes were spent on this consult, which included all of the following:  Chart review, direct face to face contact, history taking, physical examination, counseling and coordinating care, and documentation.     I reviewed the above with patient's RN.    Thank you for inviting Palliative Care Service to participate in the care of Jamee Johnson and family.       Will sign off as GOC are established.      PATRICIA Crabtree  Palliative Care    5/12/2025  12:39 PM      The 21st Century Cures Act makes medical notes like these available to patients in the interest of transparency. Please be advised this is a medical document. Medical documents are intended to carry relevant information, facts as evident, and the clinical opinion of the practitioner. The medical note is intended as a peer to peer communication, and may appear blunt or direct. It is written in medical language and may contain abbreviations or verbiage that are unfamiliar.         [1]   Past Medical History:   Atrial fibrillation (HCC)    Calculus of kidney    CVA (cerebral vascular accident) (HCC)    Depression    Diabetes (HCC)    Esophageal reflux    High blood pressure    High cholesterol    HLD (hyperlipidemia)    HTN (hypertension)    Incontinence    Kidney stones    Neuropathy    Osteoarthritis    Stroke (HCC)    Visual impairment   [2]   Past Surgical History:  Procedure Laterality Date    Excis lumbar disk,one level      Knee replacement surgery      Pacemaker monitor      Removal gallbladder      Removal of kidney  stone      Total abdom hysterectomy     [3]   Allergies  Allergen Reactions    Amoxicillin Coughing, ITCHING and RASH    Statins OTHER (SEE COMMENTS)     Gets extremely weak    Enalapril Coughing    Latex ITCHING    Sulfa Antibiotics RASH   [4]   Current Facility-Administered Medications:     DULoxetine (Cymbalta) DR cap 30 mg, 30 mg, Oral, Daily    famotidine (Pepcid) tab 20 mg, 20 mg, Oral, Daily    levothyroxine (Synthroid) tab 50 mcg, 50 mcg, Oral, Before breakfast    cetirizine (ZyrTEC) tab 5 mg, 5 mg, Oral, Nightly    sertraline (Zoloft) tab 25 mg, 25 mg, Oral, Daily    sacubitril-valsartan (Entresto) 24-26 MG per tab 1 tablet, 1 tablet, Oral, BID    aspirin chewable tab 81 mg, 81 mg, Oral, Daily    rivaroxaban (Xarelto) tab 15 mg, 15 mg, Oral, Daily with food    furosemide (Lasix) tab 40 mg, 40 mg, Oral, Once per day on Monday Wednesday Friday    metoprolol succinate ER (Toprol XL) 24 hr tab 100 mg, 100 mg, Oral, Daily    spironolactone (Aldactone) tab 25 mg, 25 mg, Oral, Daily    acetaminophen (Tylenol) tab 650 mg, 650 mg, Oral, Q4H PRN **OR** acetaminophen (Tylenol) rectal suppository 650 mg, 650 mg, Rectal, Q4H PRN    labetalol (Trandate) 5 mg/mL injection 10 mg, 10 mg, Intravenous, Q10 Min PRN    hydrALAzine (Apresoline) 20 mg/mL injection 10 mg, 10 mg, Intravenous, Q2H PRN    ondansetron (Zofran) 4 MG/2ML injection 4 mg, 4 mg, Intravenous, Q6H PRN    metoclopramide (Reglan) 5 mg/mL injection 5 mg, 5 mg, Intravenous, Q8H PRN    acetaminophen (Tylenol Extra Strength) tab 500 mg, 500 mg, Oral, Q4H PRN    melatonin tab 3 mg, 3 mg, Oral, Nightly PRN    polyethylene glycol (PEG 3350) (Miralax) 17 g oral packet 17 g, 17 g, Oral, Daily PRN    sennosides (Senokot) tab 17.2 mg, 17.2 mg, Oral, Nightly PRN    bisacodyl (Dulcolax) 10 MG rectal suppository 10 mg, 10 mg, Rectal, Daily PRN    cefTRIAXone (Rocephin) 1 g in sodium chloride 0.9% 100 mL IVPB-ADDV, 1 g, Intravenous, Q24H    glucose (Dex4) 15 GM/59ML oral  liquid 15 g, 15 g, Oral, Q15 Min PRN **OR** glucose (Glutose) 40% oral gel 15 g, 15 g, Oral, Q15 Min PRN **OR** glucose-vitamin C (Dex-4) chewable tab 4 tablet, 4 tablet, Oral, Q15 Min PRN **OR** dextrose 50% injection 50 mL, 50 mL, Intravenous, Q15 Min PRN **OR** glucose (Dex4) 15 GM/59ML oral liquid 30 g, 30 g, Oral, Q15 Min PRN **OR** glucose (Glutose) 40% oral gel 30 g, 30 g, Oral, Q15 Min PRN **OR** glucose-vitamin C (Dex-4) chewable tab 8 tablet, 8 tablet, Oral, Q15 Min PRN    insulin aspart (NovoLOG) 100 Units/mL FlexPen 1-10 Units, 1-10 Units, Subcutaneous, q6h  [5]   Prior to Admission Medications   Medication Sig    cefuroxime 500 MG Oral Tab Take 1 tablet (500 mg total) by mouth 2 (two) times daily for 3 days.    [START ON 5/13/2025] rivaroxaban 15 MG Oral Tab Take 1 tablet (15 mg total) by mouth daily with food.   [6]   Social History  Socioeconomic History    Marital status:    Tobacco Use    Smoking status: Never    Smokeless tobacco: Never   Vaping Use    Vaping status: Never Used   Substance and Sexual Activity    Alcohol use: Never    Drug use: Never   [7]   Current Facility-Administered Medications:     DULoxetine (Cymbalta) DR cap 30 mg, 30 mg, Oral, Daily    famotidine (Pepcid) tab 20 mg, 20 mg, Oral, Daily    levothyroxine (Synthroid) tab 50 mcg, 50 mcg, Oral, Before breakfast    cetirizine (ZyrTEC) tab 5 mg, 5 mg, Oral, Nightly    sertraline (Zoloft) tab 25 mg, 25 mg, Oral, Daily    sacubitril-valsartan (Entresto) 24-26 MG per tab 1 tablet, 1 tablet, Oral, BID    aspirin chewable tab 81 mg, 81 mg, Oral, Daily    rivaroxaban (Xarelto) tab 15 mg, 15 mg, Oral, Daily with food    furosemide (Lasix) tab 40 mg, 40 mg, Oral, Once per day on Monday Wednesday Friday    metoprolol succinate ER (Toprol XL) 24 hr tab 100 mg, 100 mg, Oral, Daily    spironolactone (Aldactone) tab 25 mg, 25 mg, Oral, Daily    acetaminophen (Tylenol) tab 650 mg, 650 mg, Oral, Q4H PRN **OR** acetaminophen (Tylenol)  rectal suppository 650 mg, 650 mg, Rectal, Q4H PRN    labetalol (Trandate) 5 mg/mL injection 10 mg, 10 mg, Intravenous, Q10 Min PRN    hydrALAzine (Apresoline) 20 mg/mL injection 10 mg, 10 mg, Intravenous, Q2H PRN    ondansetron (Zofran) 4 MG/2ML injection 4 mg, 4 mg, Intravenous, Q6H PRN    metoclopramide (Reglan) 5 mg/mL injection 5 mg, 5 mg, Intravenous, Q8H PRN    acetaminophen (Tylenol Extra Strength) tab 500 mg, 500 mg, Oral, Q4H PRN    melatonin tab 3 mg, 3 mg, Oral, Nightly PRN    polyethylene glycol (PEG 3350) (Miralax) 17 g oral packet 17 g, 17 g, Oral, Daily PRN    sennosides (Senokot) tab 17.2 mg, 17.2 mg, Oral, Nightly PRN    bisacodyl (Dulcolax) 10 MG rectal suppository 10 mg, 10 mg, Rectal, Daily PRN    cefTRIAXone (Rocephin) 1 g in sodium chloride 0.9% 100 mL IVPB-ADDV, 1 g, Intravenous, Q24H    glucose (Dex4) 15 GM/59ML oral liquid 15 g, 15 g, Oral, Q15 Min PRN **OR** glucose (Glutose) 40% oral gel 15 g, 15 g, Oral, Q15 Min PRN **OR** glucose-vitamin C (Dex-4) chewable tab 4 tablet, 4 tablet, Oral, Q15 Min PRN **OR** dextrose 50% injection 50 mL, 50 mL, Intravenous, Q15 Min PRN **OR** glucose (Dex4) 15 GM/59ML oral liquid 30 g, 30 g, Oral, Q15 Min PRN **OR** glucose (Glutose) 40% oral gel 30 g, 30 g, Oral, Q15 Min PRN **OR** glucose-vitamin C (Dex-4) chewable tab 8 tablet, 8 tablet, Oral, Q15 Min PRN    insulin aspart (NovoLOG) 100 Units/mL FlexPen 1-10 Units, 1-10 Units, Subcutaneous, q6h

## 2025-06-16 NOTE — PROGRESS NOTE ADULT - SUBJECTIVE AND OBJECTIVE BOX
O/N: Wound VAC changed due to leak.   2/27: wound vac applied to midline wound PRINCIPAL DISCHARGE DIAGNOSIS  Diagnosis: Acute CHF  Assessment and Plan of Treatment: Please take medications as prescribed  Please follow up with cardiology for further evaluation and addition of medications     O/N: Wound VAC changed due to leak.   2/27: wound vac applied to midline wound    SUBJECTIVE: Pt seen and examined at bedside. No overnight events.  Pain controlled. No f/c/n/v.     Vital Signs Last 24 Hrs  T(C): 36.2, Max: 37.1 (02-27 @ 14:00)  T(F): 97.1, Max: 98.8 (02-27 @ 17:50)  HR: 63 (63 - 87)  BP: 127/84 (115/75 - 127/84)  BP(mean): --  RR: 17 (16 - 18)  SpO2: 96% (95% - 97%)    PHYSICAL EXAM    Gen: NAD  Pulm: no respiratory distress  Abd: soft,  midline wound granulating tissue , purulent material coming out of the fistula, 5 incision and drainage  sites granulating tissue observed, wound vac applied to midline wound, functioning, lateral I&D sites repacked    I&O's Detail    I & Os for current day (as of 28 Feb 2017 07:50)  =============================================  IN:    Oral Fluid: 2000 ml    Total IN: 2000 ml  ---------------------------------------------  OUT:    Voided: 1250 ml    VAC (Vacuum Assisted Closure) System: 100 ml    Total OUT: 1350 ml  ---------------------------------------------  Total NET: 650 ml      LABS:                        9.6    6.6   )-----------( 273      ( 28 Feb 2017 06:53 )             32.0     28 Feb 2017 06:53    139    |  103    |  22     ----------------------------<  87     4.3     |  27     |  0.68     Ca    9.6        28 Feb 2017 06:53  Phos  3.8       28 Feb 2017 06:53  Mg     2.2       28 Feb 2017 06:53            MEDICATIONS  (STANDING):  losartan 100milliGRAM(s) Oral daily  escitalopram 20milliGRAM(s) Oral daily  ALBUTerol    90 MICROgram(s) HFA Inhaler 1Puff(s) Inhalation every 4 hours  cyanocobalamin 250MICROGram(s) Oral daily  cholecalciferol 400Unit(s) Oral daily  multivitamin 1Tablet(s) Oral daily  ferrous    sulfate 325milliGRAM(s) Oral daily  enoxaparin Injectable 60milliGRAM(s) SubCutaneous daily  pantoprazole    Tablet 40milliGRAM(s) Oral daily    MEDICATIONS  (PRN):  acetaminophen   Tablet. 325milliGRAM(s) Oral every 4 hours PRN Moderate Pain (4 - 6)  ondansetron Injectable 4milliGRAM(s) IV Push every 6 hours PRN Nausea  ALBUTerol    0.083% 2.5milliGRAM(s) Nebulizer every 6 hours PRN Shortness of Breath and/or Wheezing  oxybutynin 5milliGRAM(s) Oral two times a day PRN Bladder spasms  diazepam    Tablet 10milliGRAM(s) Oral at bedtime PRN insomnia  diphenhydrAMINE   Capsule 25milliGRAM(s) Oral every 6 hours PRN Rash and/or Itching  oxyCODONE  5 mG/acetaminophen 325 mG 1Tablet(s) Oral every 4 hours PRN Severe Pain (7 - 10)      RADIOLOGY & ADDITIONAL STUDIES:    ASSESSMENT AND PLAN
